# Patient Record
Sex: FEMALE | Race: WHITE | NOT HISPANIC OR LATINO | Employment: OTHER | URBAN - METROPOLITAN AREA
[De-identification: names, ages, dates, MRNs, and addresses within clinical notes are randomized per-mention and may not be internally consistent; named-entity substitution may affect disease eponyms.]

---

## 2013-06-18 LAB — PAP SMEAR - HIM PATIENT REPORTED: NEGATIVE

## 2017-01-07 ENCOUNTER — ALLIED HEALTH/NURSE VISIT (OUTPATIENT)
Dept: FAMILY MEDICINE | Facility: CLINIC | Age: 72
End: 2017-01-07
Payer: MEDICARE

## 2017-01-07 VITALS — DIASTOLIC BLOOD PRESSURE: 50 MMHG | SYSTOLIC BLOOD PRESSURE: 134 MMHG

## 2017-01-07 DIAGNOSIS — I10 BENIGN ESSENTIAL HYPERTENSION: Primary | ICD-10-CM

## 2017-01-07 PROCEDURE — 99207 ZZC NO CHARGE NURSE ONLY: CPT | Performed by: FAMILY MEDICINE

## 2017-01-07 NOTE — PROGRESS NOTES
Chasity Hodgson is enrolled/participating in the retail pharmacy Blood Pressure Goals Achievement Program (BPGAP).  Chasity Hodgson was evaluated at [facility] on January 7, 2017 at which time her blood pressure was:    BP Readings from Last 3 Encounters:   01/07/17 134/50   11/29/16 132/60   11/22/16 126/68     Reviewed lifestyle modifications for blood pressure control and reduction: including making healthy food choices, managing weight, getting regular exercise, smoking cessation, reducing alcohol consumption, monitoring blood pressure regularly.     Chasity Hodgson is not experiencing symptoms.    Follow-Up: BP is at goal of < 140/90mmHg (patient 18+ years of age with or without diabetes).  Recommended follow-up at pharmacy in 6 months.     Completed by: Nichelle Hernandez, PharmD  [facility]

## 2017-01-09 DIAGNOSIS — Z13.6 CARDIOVASCULAR SCREENING; LDL GOAL LESS THAN 130: Primary | ICD-10-CM

## 2017-01-09 RX ORDER — FENOFIBRATE 145 MG/1
145 TABLET, COATED ORAL DAILY
Qty: 90 TABLET | Refills: 0 | Status: CANCELLED | OUTPATIENT
Start: 2017-01-09

## 2017-01-09 NOTE — TELEPHONE ENCOUNTER
Pending Prescriptions:                       Disp   Refills    fenofibrate 145 MG tablet                 90 tab*0            Sig: Take 1 tablet (145 mg) by mouth daily          Last Written Prescription Date: 10-5-16  Last Fill Quantity: 90, # refills: 0  Last Office Visit with G, P or [facility] prescribing provider: 11-29-16   Next 5 appointments (look out 90 days)     Regulo 10, 2017  1:00 PM   [facility]    [address] [facility] [id number] [phone]                   POTASSIUM   Date Value Ref Range Status   11/22/2016 4.6 3.4 - 5.3 mmol/L Final     CREATININE   Date Value Ref Range Status   11/22/2016 1.11* 0.52 - 1.04 mg/dL Final     BP Readings from Last 3 Encounters:   01/07/17 134/50   11/29/16 132/60   11/22/16 126/68     RT Bernardo (R)

## 2017-01-10 ENCOUNTER — OFFICE VISIT (OUTPATIENT)
Dept: FAMILY MEDICINE | Facility: CLINIC | Age: 72
End: 2017-01-10
Payer: MEDICARE

## 2017-01-10 VITALS
DIASTOLIC BLOOD PRESSURE: 60 MMHG | WEIGHT: 211 LBS | OXYGEN SATURATION: 98 % | SYSTOLIC BLOOD PRESSURE: 132 MMHG | TEMPERATURE: 96.7 F | HEART RATE: 107 BPM | BODY MASS INDEX: 38.83 KG/M2 | HEIGHT: 62 IN

## 2017-01-10 DIAGNOSIS — K31.84 DIABETIC GASTROPARESIS (H): ICD-10-CM

## 2017-01-10 DIAGNOSIS — Z93.3 COLOSTOMY IN PLACE (H): ICD-10-CM

## 2017-01-10 DIAGNOSIS — R11.0 NAUSEA: ICD-10-CM

## 2017-01-10 DIAGNOSIS — A80.9 POLIOMYELITIS: ICD-10-CM

## 2017-01-10 DIAGNOSIS — I10 BENIGN ESSENTIAL HYPERTENSION: ICD-10-CM

## 2017-01-10 DIAGNOSIS — I10 ESSENTIAL HYPERTENSION: ICD-10-CM

## 2017-01-10 DIAGNOSIS — R10.84 ABDOMINAL PAIN, GENERALIZED: Primary | ICD-10-CM

## 2017-01-10 DIAGNOSIS — E78.5 HYPERLIPIDEMIA LDL GOAL <100: ICD-10-CM

## 2017-01-10 DIAGNOSIS — E11.43 DIABETIC GASTROPARESIS (H): ICD-10-CM

## 2017-01-10 DIAGNOSIS — D62 ANEMIA DUE TO BLOOD LOSS, ACUTE: ICD-10-CM

## 2017-01-10 DIAGNOSIS — E11.69 TYPE 2 DIABETES MELLITUS WITH OTHER SPECIFIED COMPLICATION (H): ICD-10-CM

## 2017-01-10 DIAGNOSIS — Z13.6 CARDIOVASCULAR SCREENING; LDL GOAL LESS THAN 130: ICD-10-CM

## 2017-01-10 LAB
BASOPHILS # BLD AUTO: 0 10E9/L (ref 0–0.2)
BASOPHILS NFR BLD AUTO: 0.4 %
DIFFERENTIAL METHOD BLD: ABNORMAL
EOSINOPHIL # BLD AUTO: 0.3 10E9/L (ref 0–0.7)
EOSINOPHIL NFR BLD AUTO: 3.4 %
ERYTHROCYTE [DISTWIDTH] IN BLOOD BY AUTOMATED COUNT: 11.9 % (ref 10–15)
HBA1C MFR BLD: 7.4 % (ref 4.3–6)
HCT VFR BLD AUTO: 32.8 % (ref 35–47)
HGB BLD-MCNC: 11 G/DL (ref 11.7–15.7)
LIPASE SERPL-CCNC: 101 U/L (ref 73–393)
LYMPHOCYTES # BLD AUTO: 2 10E9/L (ref 0.8–5.3)
LYMPHOCYTES NFR BLD AUTO: 24.3 %
MCH RBC QN AUTO: 30.4 PG (ref 26.5–33)
MCHC RBC AUTO-ENTMCNC: 33.5 G/DL (ref 31.5–36.5)
MCV RBC AUTO: 91 FL (ref 78–100)
MONOCYTES # BLD AUTO: 0.6 10E9/L (ref 0–1.3)
MONOCYTES NFR BLD AUTO: 7.1 %
NEUTROPHILS # BLD AUTO: 5.3 10E9/L (ref 1.6–8.3)
NEUTROPHILS NFR BLD AUTO: 64.8 %
PLATELET # BLD AUTO: 201 10E9/L (ref 150–450)
RBC # BLD AUTO: 3.62 10E12/L (ref 3.8–5.2)
RETICS # AUTO: 88.3 10E9/L (ref 25–95)
RETICS/RBC NFR AUTO: 2.4 % (ref 0.5–2)
WBC # BLD AUTO: 8.2 10E9/L (ref 4–11)

## 2017-01-10 PROCEDURE — 85045 AUTOMATED RETICULOCYTE COUNT: CPT | Performed by: INTERNAL MEDICINE

## 2017-01-10 PROCEDURE — 40000847 ZZHCL STATISTIC MORPHOLOGY W/INTERP HISTOLOGY TC 85060: Performed by: INTERNAL MEDICINE

## 2017-01-10 PROCEDURE — 85060 BLOOD SMEAR INTERPRETATION: CPT | Performed by: INTERNAL MEDICINE

## 2017-01-10 PROCEDURE — 36415 COLL VENOUS BLD VENIPUNCTURE: CPT | Performed by: INTERNAL MEDICINE

## 2017-01-10 PROCEDURE — 80053 COMPREHEN METABOLIC PANEL: CPT | Performed by: INTERNAL MEDICINE

## 2017-01-10 PROCEDURE — 83690 ASSAY OF LIPASE: CPT | Performed by: INTERNAL MEDICINE

## 2017-01-10 PROCEDURE — 82043 UR ALBUMIN QUANTITATIVE: CPT | Performed by: INTERNAL MEDICINE

## 2017-01-10 PROCEDURE — 83036 HEMOGLOBIN GLYCOSYLATED A1C: CPT | Performed by: INTERNAL MEDICINE

## 2017-01-10 PROCEDURE — 93000 ELECTROCARDIOGRAM COMPLETE: CPT | Performed by: INTERNAL MEDICINE

## 2017-01-10 PROCEDURE — 85025 COMPLETE CBC W/AUTO DIFF WBC: CPT | Performed by: INTERNAL MEDICINE

## 2017-01-10 PROCEDURE — 99214 OFFICE O/P EST MOD 30 MIN: CPT | Performed by: INTERNAL MEDICINE

## 2017-01-10 RX ORDER — LISINOPRIL 20 MG/1
20 TABLET ORAL DAILY
Qty: 90 TABLET | Refills: 2 | Status: SHIPPED | OUTPATIENT
Start: 2017-01-10 | End: 2017-10-25

## 2017-01-10 RX ORDER — HYDROCHLOROTHIAZIDE 25 MG/1
25 TABLET ORAL DAILY
Qty: 90 TABLET | Refills: 2 | Status: SHIPPED | OUTPATIENT
Start: 2017-01-10 | End: 2017-11-22

## 2017-01-10 RX ORDER — ATENOLOL 50 MG/1
50 TABLET ORAL 2 TIMES DAILY
Qty: 180 TABLET | Refills: 2 | Status: SHIPPED | OUTPATIENT
Start: 2017-01-10 | End: 2017-10-30

## 2017-01-10 RX ORDER — FENOFIBRATE 145 MG/1
145 TABLET, COATED ORAL DAILY
Qty: 90 TABLET | Refills: 2 | Status: SHIPPED | OUTPATIENT
Start: 2017-01-10 | End: 2017-09-26

## 2017-01-10 RX ORDER — GABAPENTIN 300 MG/1
300 CAPSULE ORAL
Qty: 90 CAPSULE | Refills: 3 | Status: SHIPPED | OUTPATIENT
Start: 2017-01-10 | End: 2017-02-06

## 2017-01-10 RX ORDER — ATORVASTATIN CALCIUM 20 MG/1
20 TABLET, FILM COATED ORAL DAILY
Qty: 90 TABLET | Refills: 2 | Status: SHIPPED | OUTPATIENT
Start: 2017-01-10 | End: 2017-06-14

## 2017-01-10 NOTE — PATIENT INSTRUCTIONS
(R10.84) Abdominal pain, generalized  (primary encounter diagnosis)  Comment: We will check CT of the abdomen and check liver function tests and lipase level as well as urinalysis today.  I would recommend a follow up in gastroenterology to review as well and consider follow up colonoscopy   Plan: CT Abdomen w/o Contrast, Comprehensive         metabolic panel, Lipase            (R11.0) Nausea  Comment: We will check labs today and if hemoglobin A1c is stable we could consider adjustment by stopping the januvia  Plan:     (E11.69) Type 2 diabetes mellitus with other specified complication (H)  Comment: as above - also follow up in endocrinology   Plan: Hemoglobin A1c, Comprehensive metabolic panel,         Albumin Random Urine Quantitative, UA reflex to        Microscopic and Culture            (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

## 2017-01-10 NOTE — PROGRESS NOTES
"[facility]  CLINIC PROGRESS NOTE    Subjective:  Benign hypertension   She did stop taking amlodipine as it was causing significant swelling in her ankles.  Her blood pressure has been checked at the pharmacy an it was 132/60's.  She feels well with her current medications.    Nausea   She has had severe nausea and wonders if Januvia is contributing to this as well as to her loose stools.  She has had typically loose stools every time.  She has continued to take Januvia.  Cough   She still has episodes of cough and episodes of feeling horse.  Chest pain   She attributes evening chest pain to gastroesophageal reflux symptoms and taking nexium twice per day.  Her nausea is due to this.   Dizziness   She does have occasional episodes of dizziness  Sleepiness   She also notes occasional symptoms of feeling sleepiness.     Past medical history, medications, allergies, social history, family history reviewed and updated in EPIC as of 1/10/2017 .    ROS  CONSTITUTIONAL: no fatigue, no unexpected change in weight  SKIN: no worrisome rashes, no worrisome moles, no worrisome lesions  EYES: no acute vision problems or changes  ENT: no ear problems, no mouth problems, no throat problems  RESP: no significant cough, no shortness of breath  CV: no chest pain, no palpitations, no new or worsening peripheral edema  GI: as above   : no frequency, no dysuria, no hematuria  MS: no claudication, no myalgias, no joint aches  PSYCHIATRIC: no changes in mood or affect      Objective:  Vitals  /60 mmHg  Pulse 107  Temp(Src) 96.7  F (35.9  C)  Ht 5' 1.5\" (1.562 m)  Wt 211 lb (95.709 kg)  BMI 39.23 kg/m2  SpO2 98%  Breastfeeding? No  GEN: Alert Oriented x3 NAD  HEENT: Atraumatic, normocephalic, neck supple, no thyromegaly, negative cervical adenopathy  CV: RRR no murmurs or rubs  PULM: CTA no wheezes or crackles  ABD: Soft, tenderness in the right upper quadrant and epigastrium, ostomy site intact   SKIN: No visible " skin lesion or ulcerations  EXT: 2+ edema bilateral lower extremities  NEURO: Gait and station deferred, No focal neurologic deficits  PSYCH: Mood good, affect mood congruent    No results found for this or any previous visit (from the past 24 hour(s)).    Assessment/Plan:  Patient Instructions   (R10.84) Abdominal pain, generalized  (primary encounter diagnosis)  Comment: We will check CT of the abdomen and check liver function tests and lipase level as well as urinalysis today.  I would recommend a follow up in gastroenterology to review as well and consider follow up colonoscopy   Plan: CT Abdomen w/o Contrast, Comprehensive         metabolic panel, Lipase            (R11.0) Nausea  Comment: We will check labs today and if hemoglobin A1c is stable we could consider adjustment by stopping the januvia  Plan:     (E11.69) Type 2 diabetes mellitus with other specified complication (H)  Comment: as above - also follow up in endocrinology   Plan: Hemoglobin A1c, Comprehensive metabolic panel,         Albumin Random Urine Quantitative, UA reflex to        Microscopic and Culture            (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                  Follow up in gastroenterology and endocrinology     Disclaimer: This note consists of symbols derived from keyboarding, dictation and/or voice recognition software. As a result, there may be errors in the script that have gone undetected. Please consider this when  interpreting information found in this chart.    Shola Benoit MD  [phone]

## 2017-01-10 NOTE — MR AVS SNAPSHOT
After Visit Summary   1/10/2017    Chasity Hodgson    MRN: [id number]           Patient Information     Date Of Birth          1945        Visit Information        Provider Department      1/10/2017 1:00 PM Shola Benoit MD [facility]        Today's Diagnoses     Abdominal pain, generalized    -  1     Nausea         Type 2 diabetes mellitus with other specified complication (H)         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)         Diabetic gastroparesis (H)         Benign essential hypertension         Anemia due to blood loss, acute         Poliomyelitis         CARDIOVASCULAR SCREENING; LDL GOAL LESS THAN 130         Hyperlipidemia LDL goal <100         Essential hypertension           Care Instructions    (R10.84) Abdominal pain, generalized  (primary encounter diagnosis)  Comment: We will check CT of the abdomen and check liver function tests and lipase level as well as urinalysis today.  I would recommend a follow up in gastroenterology to review as well and consider follow up colonoscopy   Plan: CT Abdomen w/o Contrast, Comprehensive         metabolic panel, Lipase            (R11.0) Nausea  Comment: We will check labs today and if hemoglobin A1c is stable we could consider adjustment by stopping the januvia  Plan:     (E11.69) Type 2 diabetes mellitus with other specified complication (H)  Comment: as above - also follow up in endocrinology   Plan: Hemoglobin A1c, Comprehensive metabolic panel,         Albumin Random Urine Quantitative, UA reflex to        Microscopic and Culture            (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)  Comment: Follow up in gastroenterology as above  Plan:     (E11.43,  K31.84) Diabetic gastroparesis (H)  Comment: OK to continue metoclopramide - consider taking this regularly scheduled daily with breakfast rather than as needed   Plan:     (I10) Benign essential hypertension  Comment: blood pressure is excellent - OK to hold amlodipne   Plan:     (D62) Anemia due  "to blood loss, acute  Comment: recheck of hemoglobin and also check peripheral smear  Plan: CBC with platelets, BLOOD MORPHOLOGY         PATHOLOGIST REVIEW                    Follow-ups after your visit        Future tests that were ordered for you today     Open Future Orders        Priority Expected Expires Ordered    UA reflex to Microscopic and Culture Routine  1/11/2018 1/10/2017    CT Abdomen w/o Contrast Routine  1/10/2018 1/10/2017            Who to contact     If you have questions or need follow up information about today's clinic visit or your schedule please contact [facility] directly at [phone].  Normal or non-critical lab and imaging results will be communicated to you by [employer], letter or phone within 4 business days after the clinic has received the results. If you do not hear from us within 7 days, please contact the clinic through [employer] or phone. If you have a critical or abnormal lab result, we will notify you by phone as soon as possible.  Submit refill requests through [employer] or call your pharmacy and they will forward the refill request to us. Please allow 3 business days for your refill to be completed.          Additional Information About Your Visit        MyChart Information     [employer] gives you secure access to your electronic health record. If you see a primary care provider, you can also send messages to your care team and make appointments. If you have questions, please call your primary care clinic.  If you do not have a primary care provider, please call [phone] and they will assist you.        Care EveryWhere ID     This is your Care EveryWhere ID. This could be used by other organizations to access your [place] medical records  [id number]        Your Vitals Were     Pulse Temperature Height BMI (Body Mass Index) Pulse Oximetry Breastfeeding?    107 96.7  F (35.9  C) 5' 1.5\" (1.562 m) 39.23 kg/m2 98% No       Blood Pressure from Last 3 Encounters: "   01/10/17 132/60   01/07/17 134/50   11/29/16 132/60    Weight from Last 3 Encounters:   01/10/17 211 lb (95.709 kg)   11/29/16 213 lb 1.6 oz (96.662 kg)   11/22/16 211 lb 9.6 oz (95.981 kg)              We Performed the Following     Albumin Random Urine Quantitative     BLOOD MORPHOLOGY PATHOLOGIST REVIEW     CBC with platelets     Comprehensive metabolic panel     Hemoglobin A1c     Lipase          Today's Medication Changes          These changes are accurate as of: 1/10/17  1:34 PM.  If you have any questions, ask your nurse or doctor.               These medicines have changed or have updated prescriptions.        Dose/Directions    atenolol 50 MG tablet   Commonly known as:  TENORMIN   This may have changed:  See the new instructions.   Used for:  Essential hypertension   Changed by:  Shola Benoit MD        Dose:  50 mg   Take 1 tablet (50 mg) by mouth 2 times daily   Quantity:  180 tablet   Refills:  2       atorvastatin 20 MG tablet   Commonly known as:  LIPITOR   This may have changed:  See the new instructions.   Used for:  Hyperlipidemia LDL goal <100   Changed by:  Shola Benoit MD        Dose:  20 mg   Take 1 tablet (20 mg) by mouth daily   Quantity:  90 tablet   Refills:  2       fenofibrate 145 MG tablet   This may have changed:  See the new instructions.   Used for:  CARDIOVASCULAR SCREENING; LDL GOAL LESS THAN 130   Changed by:  Shola Benoit MD        Dose:  145 mg   Take 1 tablet (145 mg) by mouth daily   Quantity:  90 tablet   Refills:  2       hydrochlorothiazide 25 MG tablet   Commonly known as:  HYDRODIURIL   This may have changed:  See the new instructions.   Used for:  Essential hypertension   Changed by:  Shola Benoit MD        Dose:  25 mg   Take 1 tablet (25 mg) by mouth daily   Quantity:  90 tablet   Refills:  2       lisinopril 20 MG tablet   Commonly known as:  PRINIVIL/ZESTRIL   This may have changed:  See the new instructions.   Used  for:  Essential hypertension   Changed by:  Shola Benoit MD        Dose:  20 mg   Take 1 tablet (20 mg) by mouth daily   Quantity:  90 tablet   Refills:  2            Where to get your medicines      These medications were sent to [facility] [place], [place] - [address]  [address] [facility] [address]     Phone:  [phone]    - atenolol 50 MG tablet  - atorvastatin 20 MG tablet  - fenofibrate 145 MG tablet  - gabapentin 300 MG capsule  - hydrochlorothiazide 25 MG tablet  - lisinopril 20 MG tablet             Primary Care Provider Office Phone # Fax #    Shola Benoit [phone]       [facility] [address]  [facility] [address]        Thank you!     Thank you for choosing [facility]  for your care. Our goal is always to provide you with excellent care. Hearing back from our patients is one way we can continue to improve our services. Please take a few minutes to complete the written survey that you may receive in the mail after your visit with us. Thank you!             Your Updated Medication List - Protect others around you: Learn how to safely use, store and throw away your medicines at www.disposemymeds.org.          This list is accurate as of: 1/10/17  1:34 PM.  Always use your most recent med list.                   Brand Name Dispense Instructions for use    albuterol 108 (90 BASE) MCG/ACT Inhaler    PROAIR HFA/PROVENTIL HFA/VENTOLIN HFA    1 Inhaler    Inhale 2 puffs into the lungs every 6 hours as needed for shortness of breath / dyspnea or wheezing       amLODIPine 2.5 MG tablet    NORVASC    30 tablet    Take 1 tablet (2.5 mg) by mouth daily       ascorbic acid 1000 MG Tabs    vitamin C     Take 1,000 mg by mouth daily       atenolol 50 MG tablet    TENORMIN    180 tablet    Take 1 tablet (50 mg) by mouth 2 times daily       atorvastatin 20 MG tablet    LIPITOR    90 tablet    Take 1 tablet (20  mg) by mouth daily       benzonatate 200 MG capsule    TESSALON    21 capsule    Take 1 capsule (200 mg) by mouth 3 times daily as needed for cough       BIOTIN PO      Take 1,000 mcg by mouth       blood glucose monitoring test strip    no brand specified    1 Box    Use to test blood sugar 1 times daily or as directed.       CALCIUM CITRATE + D PO      Take 2 tablets by mouth daily       cycloSPORINE 0.05 % ophthalmic emulsion    RESTASIS     1 drop 2 times daily       diphenhydrAMINE-acetaminophen  MG tablet    TYLENOL PM     Take 1 tablet by mouth At Bedtime       emollient cream     60 g    Apply twice daily as needed to face. Mix half and half with 1% hydrocortisone       esomeprazole 40 MG CR capsule    nexIUM     Take 40 mg by mouth 2 times daily Take 30-60 minutes before eating.       estradiol 1 MG tablet    ESTRACE     Take 1 mg by mouth daily       fenofibrate 145 MG tablet     90 tablet    Take 1 tablet (145 mg) by mouth daily       ferrous sulfate 325 (65 FE) MG tablet    IRON     Take 1 tablet by mouth daily (with breakfast).       fexofenadine 180 MG tablet    ALLEGRA    120    Take 180 mg by mouth daily.       fluticasone 50 MCG/BLIST Aepb    FLOVENT DISKUS     Inhale 1 puff into the lungs every 12 hours       gabapentin 300 MG capsule    NEURONTIN    90 capsule    Take 1 capsule (300 mg) by mouth every evening as needed       hydrochlorothiazide 25 MG tablet    HYDRODIURIL    90 tablet    Take 1 tablet (25 mg) by mouth daily       lisinopril 20 MG tablet    PRINIVIL/ZESTRIL    90 tablet    Take 1 tablet (20 mg) by mouth daily       metFORMIN 500 MG tablet    GLUCOPHAGE     Take 500 mg by mouth 2 times daily (with meals)       metoclopramide 10 MG tablet    REGLAN    120 tablet    Take 1 tablet (10 mg) by mouth At Bedtime       multivitamin per tablet     100    ONE DAILY       nystatin cream    MYCOSTATIN     Apply topically daily as needed       nystatin-triamcinolone cream    MYCOLOG II      Apply topically daily as needed       ondansetron 4 MG tablet    ZOFRAN     Take 1 tablet by mouth every 6 hours as needed       * order for DME     1 Units    Equipment being ordered: Oral appliance for sleep apnea       * order for DME     2 each    Equipment being ordered: Compression stockings - Knee High; 20-30 mmHg compression       ranitidine 300 MG tablet    ZANTAC    30 tablet    Take by mouth At Bedtime       SB LOW DOSE ASA EC 81 MG EC tablet   Generic drug:  aspirin      Take 81 mg by mouth daily       sitagliptin 50 MG tablet    JANUVIA     Take 50 mg by mouth daily       sucralfate 1 GM/10ML suspension    CARAFATE     Take 10 mLs by mouth 3 times daily as needed       SYNTHROID 112 MCG tablet   Generic drug:  levothyroxine      Take 112 mcg by mouth daily Take 112 mcg daily except for Friday take 56 mcg.  Brand name Synthroid       VITAMIN D-3 PO      Take 2 tablets by mouth       * Notice:  This list has 2 medication(s) that are the same as other medications prescribed for you. Read the directions carefully, and ask your doctor or other care provider to review them with you.

## 2017-01-11 LAB
ALBUMIN SERPL-MCNC: 3.3 G/DL (ref 3.4–5)
ALP SERPL-CCNC: 37 U/L (ref 40–150)
ALT SERPL W P-5'-P-CCNC: 31 U/L (ref 0–50)
ANION GAP SERPL CALCULATED.3IONS-SCNC: 8 MMOL/L (ref 3–14)
AST SERPL W P-5'-P-CCNC: 26 U/L (ref 0–45)
BILIRUB SERPL-MCNC: 0.4 MG/DL (ref 0.2–1.3)
BUN SERPL-MCNC: 22 MG/DL (ref 7–30)
CALCIUM SERPL-MCNC: 9 MG/DL (ref 8.5–10.1)
CHLORIDE SERPL-SCNC: 100 MMOL/L (ref 94–109)
CO2 SERPL-SCNC: 28 MMOL/L (ref 20–32)
COPATH REPORT: NORMAL
CREAT SERPL-MCNC: 0.96 MG/DL (ref 0.52–1.04)
CREAT UR-MCNC: 56 MG/DL
GFR SERPL CREATININE-BSD FRML MDRD: 57 ML/MIN/1.7M2
GLUCOSE SERPL-MCNC: 168 MG/DL (ref 70–99)
MICROALBUMIN UR-MCNC: <5 MG/L
MICROALBUMIN/CREAT UR: NORMAL MG/G CR (ref 0–25)
POTASSIUM SERPL-SCNC: 4.7 MMOL/L (ref 3.4–5.3)
PROT SERPL-MCNC: 6.7 G/DL (ref 6.8–8.8)
SODIUM SERPL-SCNC: 136 MMOL/L (ref 133–144)

## 2017-01-13 ENCOUNTER — HOSPITAL ENCOUNTER (OUTPATIENT)
Dept: CT IMAGING | Facility: CLINIC | Age: 72
Discharge: HOME | End: 2017-01-13
Attending: INTERNAL MEDICINE | Admitting: INTERNAL MEDICINE
Payer: MEDICARE

## 2017-01-13 DIAGNOSIS — R10.84 ABDOMINAL PAIN, GENERALIZED: ICD-10-CM

## 2017-01-13 PROCEDURE — 74176 CT ABD & PELVIS W/O CONTRAST: CPT

## 2017-01-16 ENCOUNTER — TELEPHONE (OUTPATIENT)
Dept: FAMILY MEDICINE | Facility: CLINIC | Age: 72
End: 2017-01-16

## 2017-01-16 ENCOUNTER — HOSPITAL ENCOUNTER (OUTPATIENT)
Dept: CT IMAGING | Facility: CLINIC | Age: 72
Discharge: HOME | End: 2017-01-16
Attending: INTERNAL MEDICINE | Admitting: INTERNAL MEDICINE
Payer: MEDICARE

## 2017-01-16 ENCOUNTER — OFFICE VISIT (OUTPATIENT)
Dept: FAMILY MEDICINE | Facility: CLINIC | Age: 72
End: 2017-01-16
Payer: MEDICARE

## 2017-01-16 VITALS
SYSTOLIC BLOOD PRESSURE: 124 MMHG | DIASTOLIC BLOOD PRESSURE: 60 MMHG | BODY MASS INDEX: 37.91 KG/M2 | WEIGHT: 206 LBS | OXYGEN SATURATION: 97 % | TEMPERATURE: 97.6 F | HEIGHT: 62 IN | HEART RATE: 88 BPM

## 2017-01-16 DIAGNOSIS — D64.9 NORMOCYTIC ANEMIA: Primary | ICD-10-CM

## 2017-01-16 DIAGNOSIS — R10.9 ACUTE ABDOMINAL PAIN: ICD-10-CM

## 2017-01-16 DIAGNOSIS — R10.9 ACUTE ABDOMINAL PAIN: Primary | ICD-10-CM

## 2017-01-16 LAB
ALBUMIN SERPL-MCNC: 3.3 G/DL (ref 3.4–5)
ALBUMIN UR-MCNC: NEGATIVE MG/DL
ALP SERPL-CCNC: 39 U/L (ref 40–150)
ALT SERPL W P-5'-P-CCNC: 28 U/L (ref 0–50)
ANION GAP SERPL CALCULATED.3IONS-SCNC: 10 MMOL/L (ref 3–14)
APPEARANCE UR: CLEAR
AST SERPL W P-5'-P-CCNC: 28 U/L (ref 0–45)
BASOPHILS # BLD AUTO: 0 10E9/L (ref 0–0.2)
BASOPHILS NFR BLD AUTO: 0.1 %
BILIRUB SERPL-MCNC: 0.4 MG/DL (ref 0.2–1.3)
BILIRUB UR QL STRIP: NEGATIVE
BUN SERPL-MCNC: 25 MG/DL (ref 7–30)
CALCIUM SERPL-MCNC: 8.6 MG/DL (ref 8.5–10.1)
CHLORIDE SERPL-SCNC: 106 MMOL/L (ref 94–109)
CO2 SERPL-SCNC: 24 MMOL/L (ref 20–32)
COLOR UR AUTO: YELLOW
CREAT SERPL-MCNC: 0.98 MG/DL (ref 0.52–1.04)
DIFFERENTIAL METHOD BLD: ABNORMAL
EOSINOPHIL # BLD AUTO: 0.1 10E9/L (ref 0–0.7)
EOSINOPHIL NFR BLD AUTO: 1.4 %
ERYTHROCYTE [DISTWIDTH] IN BLOOD BY AUTOMATED COUNT: 12.2 % (ref 10–15)
GFR SERPL CREATININE-BSD FRML MDRD: 56 ML/MIN/1.7M2
GLUCOSE SERPL-MCNC: 180 MG/DL (ref 70–99)
GLUCOSE UR STRIP-MCNC: NEGATIVE MG/DL
HCT VFR BLD AUTO: 38.4 % (ref 35–47)
HGB BLD-MCNC: 12.9 G/DL (ref 11.7–15.7)
HGB UR QL STRIP: NEGATIVE
KETONES UR STRIP-MCNC: NEGATIVE MG/DL
LEUKOCYTE ESTERASE UR QL STRIP: NEGATIVE
LYMPHOCYTES # BLD AUTO: 0.4 10E9/L (ref 0.8–5.3)
LYMPHOCYTES NFR BLD AUTO: 5.3 %
MCH RBC QN AUTO: 30.6 PG (ref 26.5–33)
MCHC RBC AUTO-ENTMCNC: 33.6 G/DL (ref 31.5–36.5)
MCV RBC AUTO: 91 FL (ref 78–100)
MONOCYTES # BLD AUTO: 0.3 10E9/L (ref 0–1.3)
MONOCYTES NFR BLD AUTO: 4.5 %
NEUTROPHILS # BLD AUTO: 6.3 10E9/L (ref 1.6–8.3)
NEUTROPHILS NFR BLD AUTO: 88.7 %
NITRATE UR QL: NEGATIVE
PH UR STRIP: 5.5 PH (ref 5–7)
PLATELET # BLD AUTO: 219 10E9/L (ref 150–450)
POTASSIUM SERPL-SCNC: 4.4 MMOL/L (ref 3.4–5.3)
PROT SERPL-MCNC: 7.1 G/DL (ref 6.8–8.8)
RBC # BLD AUTO: 4.22 10E12/L (ref 3.8–5.2)
SODIUM SERPL-SCNC: 140 MMOL/L (ref 133–144)
SP GR UR STRIP: >1.03 (ref 1–1.03)
URN SPEC COLLECT METH UR: NORMAL
UROBILINOGEN UR STRIP-ACNC: 0.2 EU/DL (ref 0.2–1)
WBC # BLD AUTO: 7.2 10E9/L (ref 4–11)

## 2017-01-16 PROCEDURE — 99214 OFFICE O/P EST MOD 30 MIN: CPT | Performed by: INTERNAL MEDICINE

## 2017-01-16 PROCEDURE — 74177 CT ABD & PELVIS W/CONTRAST: CPT

## 2017-01-16 PROCEDURE — 36415 COLL VENOUS BLD VENIPUNCTURE: CPT | Performed by: INTERNAL MEDICINE

## 2017-01-16 PROCEDURE — 25500064 ZZH RX 255 OP 636: Performed by: INTERNAL MEDICINE

## 2017-01-16 PROCEDURE — 81003 URINALYSIS AUTO W/O SCOPE: CPT | Performed by: INTERNAL MEDICINE

## 2017-01-16 PROCEDURE — 80053 COMPREHEN METABOLIC PANEL: CPT | Performed by: INTERNAL MEDICINE

## 2017-01-16 PROCEDURE — 25000125 ZZHC RX 250: Performed by: INTERNAL MEDICINE

## 2017-01-16 PROCEDURE — 85025 COMPLETE CBC W/AUTO DIFF WBC: CPT | Performed by: INTERNAL MEDICINE

## 2017-01-16 RX ORDER — IOPAMIDOL 755 MG/ML
100 INJECTION, SOLUTION INTRAVASCULAR ONCE
Status: COMPLETED | OUTPATIENT
Start: 2017-01-16 | End: 2017-01-16

## 2017-01-16 RX ADMIN — SODIUM CHLORIDE 70 ML: 9 INJECTION, SOLUTION INTRAVENOUS at 17:59

## 2017-01-16 RX ADMIN — IOPAMIDOL 100 ML: 755 INJECTION, SOLUTION INTRAVENOUS at 17:59

## 2017-01-16 NOTE — PROGRESS NOTES
Quick Note:    Gerard Gaspar,    I have had the opportunity to review your recent results and an interpretation is as follows:  Your blood counts again show evidence of anemia and your peripheral smear corroborates this. It appears that you have a normocytic and normochromic anemia with rare teardrop cells. Given that we do not have a conclusive explanation for your anemia I would recommend a referral to hematology.   [facility]: [facility] [place] [phone]   Your conference metabolic panel also does show evidence of low total protein and low albumin - this may be secondary to protein loss through the bowel due to rapid transit. We can continue to monitor this    Sincerely,  Shola Benoit MD  ______

## 2017-01-16 NOTE — PROGRESS NOTES
Quick Note:    Gerard Gaspar,    I have had the opportunity to review your recent results and an interpretation is as follows:  Your CT scan did not show any significant findings. This is a very good result and we can continue to monitor your symptoms     Sincerely,  Shola Benoit MD  ______

## 2017-01-16 NOTE — TELEPHONE ENCOUNTER
"PCP:    Pt was seen for OV on 1/10/17 for ABD pain. CT came back normal. Was advised to see GI.     Pt states that last night she vomited 5 times, vomited 4 times this morning as well. Has vomited up some of her AM med's.   Pt had a large amount of liquid stool in her colostomy bag. For the most part her stools are liquid at baseline.   Pt has appointment on 2/14 with GI for follow up as reccommended.   Pt is having ABD pain, lower of part of ABD. Pt describes the pain as an ache \"like someone punched me in the stomach\"  Pt states she has the chills, has not checked her tempeture. Pt is having some weakness, attributes this to all of the vomiting.     No appointments with PCP here in the clinic today, nothing Left with TEAM.   I did advise Pt be seen today, offered UC at both [place] and [place]. Also transferred her to [facility] to see if they have any openings.     Marbella Aldridge RN         "

## 2017-01-16 NOTE — TELEPHONE ENCOUNTER
Reason for call:  Patient reporting a symptom    Symptom or request: vomiting 5 times last night, took synthroid then threw up again in about 5minutes, loose stools in bag yellow water ache, stomach chills, wondering if she should continue taking her medication    Duration (how long have symptoms been present): last had similar episode 3weeks ago but not as severe    Have you been treated for this before? No    Additional comments: spoke with Dr Benoit about loose stools, had ct scan last friday without contrast abdominal    Phone Number patient can be reached at:  Home number on file [phone] (home)    Best Time:  any    Can we leave a detailed message on this number:  YES    Call taken on 1/16/2017 at 8:18 AM by Brii Haywood

## 2017-01-16 NOTE — PATIENT INSTRUCTIONS
(R10.9) Acute abdominal pain  (primary encounter diagnosis)  Comment: Noted worsening abdominal pain today with some guarding.   I am concerned for small bowel obstruction versus possible volvulus.  We will request an emergent CT today and follow up accordingly.   Plan: CT Abdomen Pelvis w Contrast, CBC with         platelets and differential, Comprehensive         metabolic panel, UA reflex to Microscopic and         Culture

## 2017-01-16 NOTE — MR AVS SNAPSHOT
After Visit Summary   1/16/2017    Chasity Hodgson    MRN: [id number]           Patient Information     Date Of Birth          1945        Visit Information        Provider Department      1/16/2017 2:15 PM Shola Benoit MD [facility]        Today's Diagnoses     Acute abdominal pain    -  1       Care Instructions    (R10.9) Acute abdominal pain  (primary encounter diagnosis)  Comment: Noted worsening abdominal pain today with some guarding.   I am concerned for small bowel obstruction versus possible volvulus.  We will request an emergent CT today and follow up accordingly.   Plan: CT Abdomen Pelvis w Contrast, CBC with         platelets and differential, Comprehensive         metabolic panel, UA reflex to Microscopic and         Culture                    Follow-ups after your visit        Future tests that were ordered for you today     Open Future Orders        Priority Expected Expires Ordered    CT Abdomen Pelvis w Contrast STAT  1/16/2018 1/16/2017            Who to contact     If you have questions or need follow up information about today's clinic visit or your schedule please contact [facility] directly at [phone].  Normal or non-critical lab and imaging results will be communicated to you by [employer], letter or phone within 4 business days after the clinic has received the results. If you do not hear from us within 7 days, please contact the clinic through [employer] or phone. If you have a critical or abnormal lab result, we will notify you by phone as soon as possible.  Submit refill requests through [employer] or call your pharmacy and they will forward the refill request to us. Please allow 3 business days for your refill to be completed.          Additional Information About Your Visit        Tripp[employer] In[employer] Information     [employer] gives you secure access to your electronic health record. If you see a primary care provider, you can also send messages to  "your care team and make appointments. If you have questions, please call your primary care clinic.  If you do not have a primary care provider, please call [phone] and they will assist you.        Care EveryWhere ID     This is your Care EveryWhere ID. This could be used by other organizations to access your [facility] medical records  [id number]        Your Vitals Were     Pulse Temperature Height BMI (Body Mass Index) Pulse Oximetry Breastfeeding?    88 97.6  F (36.4  C) (Tympanic) 5' 1.5\" (1.562 m) 38.30 kg/m2 97% No       Blood Pressure from Last 3 Encounters:   01/16/17 124/60   01/10/17 132/60   01/07/17 134/50    Weight from Last 3 Encounters:   01/16/17 206 lb (93.441 kg)   01/10/17 211 lb (95.709 kg)   11/29/16 213 lb 1.6 oz (96.662 kg)              We Performed the Following     CBC with platelets and differential     Comprehensive metabolic panel     UA reflex to Microscopic and Culture        Primary Care Provider Office Phone # Fax #    Shola Benoit [phone]       [facility] [address]  [place] [address]        Thank you!     Thank you for choosing [facility]  for your care. Our goal is always to provide you with excellent care. Hearing back from our patients is one way we can continue to improve our services. Please take a few minutes to complete the written survey that you may receive in the mail after your visit with us. Thank you!             Your Updated Medication List - Protect others around you: Learn how to safely use, store and throw away your medicines at www.disposemymeds.org.          This list is accurate as of: 1/16/17  3:03 PM.  Always use your most recent med list.                   Brand Name Dispense Instructions for use    albuterol 108 (90 BASE) MCG/ACT Inhaler    PROAIR HFA/PROVENTIL HFA/VENTOLIN HFA    1 Inhaler    Inhale 2 puffs into the lungs every 6 hours as needed for shortness of breath / dyspnea or " wheezing       amLODIPine 2.5 MG tablet    NORVASC    30 tablet    Take 1 tablet (2.5 mg) by mouth daily       ascorbic acid 1000 MG Tabs    vitamin C     Take 1,000 mg by mouth daily       atenolol 50 MG tablet    TENORMIN    180 tablet    Take 1 tablet (50 mg) by mouth 2 times daily       atorvastatin 20 MG tablet    LIPITOR    90 tablet    Take 1 tablet (20 mg) by mouth daily       benzonatate 200 MG capsule    TESSALON    21 capsule    Take 1 capsule (200 mg) by mouth 3 times daily as needed for cough       BIOTIN PO      Take 1,000 mcg by mouth       blood glucose monitoring test strip    no brand specified    1 Box    Use to test blood sugar 1 times daily or as directed.       CALCIUM CITRATE + D PO      Take 2 tablets by mouth daily       cycloSPORINE 0.05 % ophthalmic emulsion    RESTASIS     1 drop 2 times daily       diphenhydrAMINE-acetaminophen  MG tablet    TYLENOL PM     Take 1 tablet by mouth At Bedtime       emollient cream     60 g    Apply twice daily as needed to face. Mix half and half with 1% hydrocortisone       esomeprazole 40 MG CR capsule    nexIUM     Take 40 mg by mouth 2 times daily Take 30-60 minutes before eating.       estradiol 1 MG tablet    ESTRACE     Take 1 mg by mouth daily       fenofibrate 145 MG tablet     90 tablet    Take 1 tablet (145 mg) by mouth daily       ferrous sulfate 325 (65 FE) MG tablet    IRON     Take 1 tablet by mouth daily (with breakfast).       fexofenadine 180 MG tablet    ALLEGRA    120    Take 180 mg by mouth daily.       fluticasone 50 MCG/BLIST Aepb    FLOVENT DISKUS     Inhale 1 puff into the lungs every 12 hours       gabapentin 300 MG capsule    NEURONTIN    90 capsule    Take 1 capsule (300 mg) by mouth every evening as needed       hydrochlorothiazide 25 MG tablet    HYDRODIURIL    90 tablet    Take 1 tablet (25 mg) by mouth daily       lisinopril 20 MG tablet    PRINIVIL/ZESTRIL    90 tablet    Take 1 tablet (20 mg) by mouth daily        metFORMIN 500 MG tablet    GLUCOPHAGE     Take 500 mg by mouth 2 times daily (with meals)       metoclopramide 10 MG tablet    REGLAN    120 tablet    Take 1 tablet (10 mg) by mouth At Bedtime       multivitamin per tablet     100    ONE DAILY       nystatin cream    MYCOSTATIN     Apply topically daily as needed       nystatin-triamcinolone cream    MYCOLOG II     Apply topically daily as needed       ondansetron 4 MG tablet    ZOFRAN     Take 1 tablet by mouth every 6 hours as needed       * order for DME     1 Units    Equipment being ordered: Oral appliance for sleep apnea       * order for DME     2 each    Equipment being ordered: Compression stockings - Knee High; 20-30 mmHg compression       ranitidine 300 MG tablet    ZANTAC    30 tablet    Take by mouth At Bedtime       SB LOW DOSE ASA EC 81 MG EC tablet   Generic drug:  aspirin      Take 81 mg by mouth daily       sitagliptin 50 MG tablet    JANUVIA     Take 50 mg by mouth daily       sucralfate 1 GM/10ML suspension    CARAFATE     Take 10 mLs by mouth 3 times daily as needed       SYNTHROID 112 MCG tablet   Generic drug:  levothyroxine      Take 112 mcg by mouth daily Take 112 mcg daily except for Friday take 56 mcg.  Brand name Synthroid       VITAMIN D-3 PO      Take 2 tablets by mouth       * Notice:  This list has 2 medication(s) that are the same as other medications prescribed for you. Read the directions carefully, and ask your doctor or other care provider to review them with you.

## 2017-01-16 NOTE — NURSING NOTE
"Chief Complaint   Patient presents with     GI Problem       Initial /60 mmHg  Pulse 88  Temp(Src) 97.6  F (36.4  C) (Tympanic)  Ht 5' 1.5\" (1.562 m)  Wt 206 lb (93.441 kg)  BMI 38.30 kg/m2  SpO2 97%  Breastfeeding? No Estimated body mass index is 38.3 kg/(m^2) as calculated from the following:    Height as of this encounter: 5' 1.5\" (1.562 m).    Weight as of this encounter: 206 lb (93.441 kg).  BP completed using cuff size: large left arm  Fozia Mcallen- CMA      "

## 2017-01-16 NOTE — PROGRESS NOTES
"[facility]  CLINIC PROGRESS NOTE    Subjective:  Gastroenteritis   Chasity Hodgson did feel good yesterday, but then woke up this AM around 3:00 AM and felt ill this AM with noted loose stools in the colostomy and vomiting this AM.  Vomit was brownish in color.  The stool was noted to be yellow/green.  She noted that she ate pizza and a small piece of cake.  She notes no other exposures.  Her  did have some loose stools this AM, but feels fine now.   She has been trying to stay hydrated, but having abdominal pain and feeling nauseas with gas distension.        Past medical history, medications, allergies, social history, family history reviewed and updated in [facility] as of 1/16/2017 .    ROS  CONSTITUTIONAL: no fatigue, no unexpected change in weight  SKIN: no worrisome rashes, no worrisome moles, no worrisome lesions  EYES: no acute vision problems or changes  ENT: no ear problems, no mouth problems, no throat problems  RESP: no significant cough, no shortness of breath  CV: no chest pain, no palpitations, no new or worsening peripheral edema  GI: as above   : no frequency, no dysuria, no hematuria  MS: Not discussed  PSYCHIATRIC: no changes in mood or affect      Objective:  Vitals  /60 mmHg  Pulse 88  Temp(Src) 97.6  F (36.4  C) (Tympanic)  Ht 5' 1.5\" (1.562 m)  Wt 206 lb (93.441 kg)  BMI 38.30 kg/m2  SpO2 97%  Breastfeeding? No  GEN: Alert Oriented x3 NAD  HEENT: Atraumatic, normocephalic, neck supple, no thyromegaly, negative cervical adenopathy  TM: TM bilaterally pearly and grey with normal light reflex  CV: RRR no murmurs or rubs  PULM: CTA no wheezes or crackles  ABD: Soft, tenderness with palpation in epigastric area, mild guarding  SKIN: No visible skin lesion or ulcerations  EXT: trace edema bilateral lower extremities  NEURO: Gait and station deferred, No focal neurologic deficits  PSYCH: Mood good, affect mood congruent    No results found for this or any previous visit " (from the past 24 hour(s)).    Assessment/Plan:  Patient Instructions   (R10.9) Acute abdominal pain  (primary encounter diagnosis)  Comment: Noted worsening abdominal pain today with some guarding.   I am concerned for small bowel obstruction versus possible volvulus.  We will request an emergent CT today and follow up accordingly.   Plan: CT Abdomen Pelvis w Contrast, CBC with         platelets and differential, Comprehensive         metabolic panel, UA reflex to Microscopic and         Culture          Addendum  CT scan was reviewed as were labs and there is no acute findings appreciated.  This was discussed with patient and she will continue with current therapy and follow-up in gastroenterology clinic.  Metformin was held for today and will be held for an additional 48 hours given contrast dye given for CT scan    25 minutes spent with patient.  Over 50% of time counseling       Follow up in gastroenterology     Disclaimer: This note consists of symbols derived from keyboarding, dictation and/or voice recognition software. As a result, there may be errors in the script that have gone undetected. Please consider this when interpreting information found in this chart.    Shola Benoit MD  [phone]

## 2017-01-17 ENCOUNTER — TELEPHONE (OUTPATIENT)
Dept: FAMILY MEDICINE | Facility: CLINIC | Age: 72
End: 2017-01-17

## 2017-01-17 DIAGNOSIS — E11.69 TYPE 2 DIABETES MELLITUS WITH OTHER SPECIFIED COMPLICATION (H): Primary | ICD-10-CM

## 2017-01-17 NOTE — TELEPHONE ENCOUNTER
Call to patient.  She states she received a piece of paper from the Radiologist yesterday with a list of instructions, she was told she had abnormal kidney function.  Patient states there were two boxes checked:    1) Patient was to hold her Metformin for 2 days.  2) Patient was to have repeat Cr drawn in 3 days.    Informed would check with PCP for plan. She did not take Metformin yesterday or today yet.  She reports she only takes once daily with her evening meal, not BID as noted in medication list.    Patient also states her vomiting has subsided, along with most of the diarrhea.  She feels fatigued and has been unable to eat yet.   She is trying to take frequent small sips of water.  Advised patient continue to rest and try to push fluids, increase diet as tolerated.  ED precautions reviewed with patient.    Forwarding to provider to advise on 1 and 2 above.  Ivana Dubon RN

## 2017-01-17 NOTE — TELEPHONE ENCOUNTER
Yes, she should continued to hold metformin until her creatinine has been rechecked  Shola Benoit MD

## 2017-01-17 NOTE — PROGRESS NOTES
Quick Note:    Gerard Gaspar,    I have had the opportunity to review your recent results and an interpretation is as follows:  As we discussed -no significant findings to explain the abdominal pain and nausea and vomiting. We will continue to monitor and follow up in gastroenterology as planned.     Sincerely,  Shola Benoit MD  ______

## 2017-01-17 NOTE — TELEPHONE ENCOUNTER
Call to inform patient of provider instructions.  Patient has no further questions, lab appointment scheduled for Thursday morning.   Ivana Dubon RN

## 2017-01-17 NOTE — TELEPHONE ENCOUNTER
Reason for Call:  Other     Detailed comments: The patient is calling to let Dr. Benoit know how she is doing  She is feeling weak but she thinks it is due to the Diarrhea   The vomiting is done but she is still a little Nauseated and has a little pain  She has not been able to eat and has only taken in a small amount of water  She has scheduled an visit with Dr. Oliva at [facility] on 1/20/2017    Phone Number Patient can be reached at: Home number on file [phone] (home)    Best Time: anytime    Can we leave a detailed message on this number? YES    Call taken on 1/17/2017 at 1:06 PM by Kayleen Tony

## 2017-01-17 NOTE — PROGRESS NOTES
Quick Note:    Gerard Gaspar,    I have had the opportunity to review your recent results and an interpretation is as follows:  Your labs returned today unchanged  There is no evidence of a urinary tract infection  Your comments a metabolic panel shows stable renal function and electrolytes as well as stable liver function tests  Your complete blood count's show an improvement in your hemoglobin and a normal white blood cell count     Sincerely,  Shola Benoit MD  ______

## 2017-01-19 DIAGNOSIS — E11.69 TYPE 2 DIABETES MELLITUS WITH OTHER SPECIFIED COMPLICATION (H): ICD-10-CM

## 2017-01-19 LAB
CREAT SERPL-MCNC: 1.13 MG/DL (ref 0.52–1.04)
GFR SERPL CREATININE-BSD FRML MDRD: 48 ML/MIN/1.7M2

## 2017-01-19 PROCEDURE — 36415 COLL VENOUS BLD VENIPUNCTURE: CPT | Performed by: INTERNAL MEDICINE

## 2017-01-19 PROCEDURE — 82565 ASSAY OF CREATININE: CPT | Performed by: INTERNAL MEDICINE

## 2017-01-20 ENCOUNTER — TRANSFERRED RECORDS (OUTPATIENT)
Dept: HEALTH INFORMATION MANAGEMENT | Facility: CLINIC | Age: 72
End: 2017-01-20

## 2017-02-14 ENCOUNTER — TRANSFERRED RECORDS (OUTPATIENT)
Dept: CARDIOLOGY | Facility: CLINIC | Age: 72
End: 2017-02-14

## 2017-02-14 ENCOUNTER — OFFICE VISIT (OUTPATIENT)
Dept: URGENT CARE | Facility: URGENT CARE | Age: 72
End: 2017-02-14
Payer: MEDICARE

## 2017-02-14 VITALS
BODY MASS INDEX: 38.48 KG/M2 | TEMPERATURE: 98.1 F | HEART RATE: 72 BPM | SYSTOLIC BLOOD PRESSURE: 136 MMHG | OXYGEN SATURATION: 97 % | DIASTOLIC BLOOD PRESSURE: 78 MMHG | WEIGHT: 207 LBS

## 2017-02-14 DIAGNOSIS — J34.89 PURULENT NASAL DISCHARGE: ICD-10-CM

## 2017-02-14 DIAGNOSIS — J30.89 SEASONAL ALLERGIC RHINITIS DUE TO OTHER ALLERGIC TRIGGER: ICD-10-CM

## 2017-02-14 DIAGNOSIS — J01.90 ACUTE SINUSITIS WITH COEXISTING CONDITION REQUIRING PROPHYLACTIC TREATMENT: Primary | ICD-10-CM

## 2017-02-14 LAB
HBA1C MFR BLD: 8.5 % (ref 0–5.7)
TSH SERPL-ACNC: 1 MCU/ML

## 2017-02-14 PROCEDURE — 99214 OFFICE O/P EST MOD 30 MIN: CPT | Performed by: PHYSICIAN ASSISTANT

## 2017-02-14 RX ORDER — AMOXICILLIN 875 MG
875 TABLET ORAL 2 TIMES DAILY
Qty: 20 TABLET | Refills: 0 | Status: SHIPPED | OUTPATIENT
Start: 2017-02-14 | End: 2017-02-24

## 2017-02-14 NOTE — NURSING NOTE
"Chief Complaint   Patient presents with     Sinus Problem     sinus headache, pressure and pain x 1 week        Initial /78 (BP Location: Right arm, Patient Position: Chair, Cuff Size: Adult Regular)  Pulse 72  Temp 98.1  F (36.7  C) (Oral)  Wt 207 lb (93.9 kg)  SpO2 97%  BMI 38.48 kg/m2 Estimated body mass index is 38.48 kg/(m^2) as calculated from the following:    Height as of 1/16/17: 5' 1.5\" (1.562 m).    Weight as of this encounter: 207 lb (93.9 kg).    "

## 2017-02-14 NOTE — MR AVS SNAPSHOT
After Visit Summary   2/14/2017    Chasity Hodgson    MRN: [id number]           Patient Information     Date Of Birth          1945        Visit Information        Provider Department      2/14/2017 3:30 PM Abhishek Patino PA-C [facility]        Today's Diagnoses     Acute sinusitis with coexisting condition requiring prophylactic treatment    -  1    Purulent nasal discharge        Seasonal allergic rhinitis due to other allergic trigger           Follow-ups after your visit        Who to contact     If you have questions or need follow up information about today's clinic visit or your schedule please contact [facility] directly at [phone].  Normal or non-critical lab and imaging results will be communicated to you by MyChart, letter or phone within 4 business days after the clinic has received the results. If you do not hear from us within 7 days, please contact the clinic through [employer]ab[employer] or phone. If you have a critical or abnormal lab result, we will notify you by phone as soon as possible.  Submit refill requests through [employer] or call your pharmacy and they will forward the refill request to us. Please allow 3 business days for your refill to be completed.          Additional Information About Your Visit        MyChart Information     [employer] gives you secure access to your electronic health record. If you see a primary care provider, you can also send messages to your care team and make appointments. If you have questions, please call your primary care clinic.  If you do not have a primary care provider, please call [phone] and they will assist you.        Care EveryWhere ID     This is your Care EveryWhere ID. This could be used by other organizations to access your [facility] medical records  [id number]        Your Vitals Were     Pulse Temperature Pulse Oximetry BMI (Body Mass Index)          72 98.1  F (36.7  C) (Oral)  97% 38.48 kg/m2         Blood Pressure from Last 3 Encounters:   02/14/17 136/78   01/16/17 124/60   01/10/17 132/60    Weight from Last 3 Encounters:   02/14/17 207 lb (93.9 kg)   01/16/17 206 lb (93.4 kg)   01/10/17 211 lb (95.7 kg)              Today, you had the following     No orders found for display         Today's Medication Changes          These changes are accurate as of: 2/14/17 11:59 PM.  If you have any questions, ask your nurse or doctor.               Start taking these medicines.        Dose/Directions    amoxicillin 875 MG tablet   Commonly known as:  AMOXIL   Used for:  Acute sinusitis with coexisting condition requiring prophylactic treatment, Purulent nasal discharge   Started by:  Abhishek Patino PA-C        Dose:  875 mg   Take 1 tablet (875 mg) by mouth 2 times daily for 10 days   Quantity:  20 tablet   Refills:  0       fluticasone 50 MCG/ACT spray   Commonly known as:  FLONASE   Used for:  Seasonal allergic rhinitis due to other allergic trigger   Started by:  Abhishek Patino PA-C        Dose:  2 spray   Spray 2 sprays in nostril daily 2 sprays in each nostril qd   Quantity:  1 Bottle   Refills:  0            Where to get your medicines      These medications [place] [facility] [place] [address]  [address] [facility] [address]     Phone:  [phone]     amoxicillin 875 MG tablet         Some of these will need a paper prescription and others can be bought over the counter.  Ask your nurse if you have questions.     Bring a paper prescription for each of these medications     fluticasone 50 MCG/ACT spray                Primary Care Provider Office Phone # Fax #    Shola Benoit [phone]       [facility] [address]  [facility] [address]        Thank you!     Thank you for choosing [facility]  for your care. Our goal is always to provide you with excellent  care. Hearing back from our patients is one way we can continue to improve our services. Please take a few minutes to complete the written survey that you may receive in the mail after your visit with us. Thank you!             Your Updated Medication List - Protect others around you: Learn how to safely use, store and throw away your medicines at www.disposemymeds.org.          This list is accurate as of: 2/14/17 11:59 PM.  Always use your most recent med list.                   Brand Name Dispense Instructions for use    albuterol 108 (90 BASE) MCG/ACT Inhaler    PROAIR HFA/PROVENTIL HFA/VENTOLIN HFA    1 Inhaler    Inhale 2 puffs into the lungs every 6 hours as needed for shortness of breath / dyspnea or wheezing       amLODIPine 2.5 MG tablet    NORVASC    30 tablet    Take 1 tablet (2.5 mg) by mouth daily       amoxicillin 875 MG tablet    AMOXIL    20 tablet    Take 1 tablet (875 mg) by mouth 2 times daily for 10 days       ascorbic acid 1000 MG Tabs    vitamin C     Take 1,000 mg by mouth daily       atenolol 50 MG tablet    TENORMIN    180 tablet    Take 1 tablet (50 mg) by mouth 2 times daily       atorvastatin 20 MG tablet    LIPITOR    90 tablet    Take 1 tablet (20 mg) by mouth daily       benzonatate 200 MG capsule    TESSALON    21 capsule    Take 1 capsule (200 mg) by mouth 3 times daily as needed for cough       BIOTIN PO      Take 1,000 mcg by mouth       blood glucose monitoring test strip    no brand specified    1 Box    Use to test blood sugar 1 times daily or as directed.       CALCIUM CITRATE + D PO      Take 2 tablets by mouth daily       cycloSPORINE 0.05 % ophthalmic emulsion    RESTASIS     1 drop 2 times daily       diphenhydrAMINE-acetaminophen  MG tablet    TYLENOL PM     Take 1 tablet by mouth At Bedtime       emollient cream     60 g    Apply twice daily as needed to face. Mix half and half with 1% hydrocortisone       esomeprazole 40 MG CR capsule    nexIUM     Take 40 mg by  mouth 2 times daily Take 30-60 minutes before eating.       estradiol 1 MG tablet    ESTRACE     Take 1 mg by mouth daily       fenofibrate 145 MG tablet     90 tablet    Take 1 tablet (145 mg) by mouth daily       ferrous sulfate 325 (65 FE) MG tablet    IRON     Take 1 tablet by mouth daily (with breakfast).       fexofenadine 180 MG tablet    ALLEGRA    120    Take 180 mg by mouth daily.       fluticasone 50 MCG/ACT spray    FLONASE    1 Bottle    Spray 2 sprays in nostril daily 2 sprays in each nostril qd       fluticasone 50 MCG/BLIST Aepb    FLOVENT DISKUS     Inhale 1 puff into the lungs every 12 hours       gabapentin 100 MG capsule    NEURONTIN    90 capsule    Take 1 capsule (100 mg) by mouth every evening as needed       hydrochlorothiazide 25 MG tablet    HYDRODIURIL    90 tablet    Take 1 tablet (25 mg) by mouth daily       lisinopril 20 MG tablet    PRINIVIL/ZESTRIL    90 tablet    Take 1 tablet (20 mg) by mouth daily       metFORMIN 500 MG tablet    GLUCOPHAGE     Take 500 mg by mouth 2 times daily (with meals) Reported on 2/14/2017       metoclopramide 10 MG tablet    REGLAN    120 tablet    Take 10 mg by mouth At Bedtime Reported on 2/14/2017       multivitamin per tablet     100    ONE DAILY       nystatin cream    MYCOSTATIN     Apply topically daily as needed       nystatin-triamcinolone cream    MYCOLOG II     Apply topically daily as needed       ondansetron 4 MG tablet    ZOFRAN     Take 1 tablet by mouth every 6 hours as needed       * order for DME     1 Units    Equipment being ordered: Oral appliance for sleep apnea       * order for DME     2 each    Equipment being ordered: Compression stockings - Knee High; 20-30 mmHg compression       ranitidine 300 MG tablet    ZANTAC    30 tablet    Take by mouth At Bedtime Reported on 2/14/2017       SB LOW DOSE ASA EC 81 MG EC tablet   Generic drug:  aspirin      Take 81 mg by mouth daily       sitagliptin 50 MG tablet    JANUVIA     Take 50 mg by  mouth daily       sucralfate 1 GM/10ML suspension    CARAFATE     Take 10 mLs by mouth 3 times daily as needed       SYNTHROID 112 MCG tablet   Generic drug:  levothyroxine      Take 112 mcg by mouth daily Take 112 mcg daily except for Friday take 56 mcg.  Brand name Synthroid       VITAMIN D-3 PO      Take 2 tablets by mouth       ZICAM COLD REMEDY PO          * Notice:  This list has 2 medication(s) that are the same as other medications prescribed for you. Read the directions carefully, and ask your doctor or other care provider to review them with you.

## 2017-02-15 LAB
ANION GAP SERPL CALCULATED.3IONS-SCNC: NORMAL MMOL/L
BUN SERPL-MCNC: 16 MG/DL
CALCIUM SERPL-MCNC: 9.1 MG/DL
CHLORIDE SERPLBLD-SCNC: 96 MMOL/L
CO2 SERPL-SCNC: 28 MMOL/L
CREAT SERPL-MCNC: 0.94 MG/DL
GFR SERPL CREATININE-BSD FRML MDRD: 61 ML/MIN/1.73M2
GLUCOSE SERPL-MCNC: 151 MG/DL (ref 70–99)
POTASSIUM SERPL-SCNC: 4.2 MMOL/L
SODIUM SERPL-SCNC: 132 MMOL/L

## 2017-02-16 RX ORDER — FLUTICASONE PROPIONATE 50 MCG
2 SPRAY, SUSPENSION (ML) NASAL DAILY
Qty: 1 BOTTLE | Refills: 0 | Status: SHIPPED | OUTPATIENT
Start: 2017-02-16 | End: 2024-07-24

## 2017-02-16 NOTE — PROGRESS NOTES
SUBJECTIVE:  Chasity Hodgson is a 71 year old female here with concerns about sinus infection.  She states onset of symptoms were 1 week(s) ago.  She has had maxillary, frontal pressure. Course of illness is worsening. Severity moderate  Current and Associated symptoms: nasal congestion, rhinorrhea and facial pain/pressure  Predisposing factors include recent illness. Recent treatment has included: OTC meds    Past Medical History   Diagnosis Date     Abdominal adhesions 1984, 96,99     s/p lysis     Allergic rhinitis, cause unspecified      Carotid stenosis      CPAP (continuous positive airway pressure) dependence      Diabetic gastroparesis (H)      Diet-controlled type 2 diabetes mellitus (H)      DVT of axillary vein, acute right (H)      Fibromyalgia      Gastro-oesophageal reflux disease      Hernia of unspecified site of abdominal cavity without mention of obstruction or gangrene      bilateral     HTN (hypertension)      Hypertriglyceridemia      Obstructive sleep apnea      ANNETTE (obstructive sleep apnea)      Papillary carcinoma of thyroid (H)      s/p thyroidectomy - Ruegemer     PE (pulmonary embolism)      Poliomyelitis      poor circulation right leg     Postsurgical hypothyroidism      s/p papillary thryoid cancer - Ruegemer     Pulmonary embolism (H)      Rosacea      S/P carpal tunnel release      bilateral     S/P hardware removal 01/2014     still with lingering foot pain     S/P shoulder surgery      bilateral     Septic joint (H)      right knee     Venous insufficiency      Venous thrombosis 1999     right axillary vein     Social History   Substance Use Topics     Smoking status: Never Smoker     Smokeless tobacco: Never Used     Alcohol use 0.0 oz/week     0 Standard drinks or equivalent per week      Comment: very rare       ROS:  CONSTITUTIONAL:NEGATIVE for fever, chills, change in weight  INTEGUMENTARY/SKIN: NEGATIVE for worrisome rashes, moles or lesions  ENT/MOUTH: POSITIVE for sinus pain,  pressure, drainage  RESP:POSITIVE for cough-non productive  CV: NEGATIVE for chest pain, palpitations or peripheral edema  GI: NEGATIVE for nausea, abdominal pain, heartburn, or change in bowel habits  MUSCULOSKELETAL: NEGATIVE for significant arthralgias or myalgia  NEURO: NEGATIVE for weakness, dizziness or paresthesias    OBJECTIVE:  /78 (BP Location: Right arm, Patient Position: Chair, Cuff Size: Adult Regular)  Pulse 72  Temp 98.1  F (36.7  C) (Oral)  Wt 207 lb (93.9 kg)  SpO2 97%  BMI 38.48 kg/m2  Exam:GENERAL APPEARANCE: healthy, alert and no distress  EYES: EOMI,  PERRL, conjunctiva clear  HENT: TM's normal bilaterally, nasal turbinates erythematous, swollen and rhinorrhea yellow and pink  NECK: supple, nontender, no lymphadenopathy  RESP: lungs clear to auscultation - no rales, rhonchi or wheezes  CV: regular rates and rhythm, normal S1 S2, no murmur noted  NEURO: Normal strength and tone, sensory exam grossly normal,  normal speech and mentation  SKIN: no suspicious lesions or rashes    ASSESSMENT/PLAN/PLAN:      ICD-10-CM    1. Acute sinusitis with coexisting condition requiring prophylactic treatment J01.90 amoxicillin (AMOXIL) 875 MG tablet   2. Purulent nasal discharge J34.89 amoxicillin (AMOXIL) 875 MG tablet   3. Seasonal allergic rhinitis due to other allergic trigger J30.89 fluticasone (FLONASE) 50 MCG/ACT spray     Saline nasal spray  Follow up with primary clinic if not improving

## 2017-02-17 ENCOUNTER — TRANSFERRED RECORDS (OUTPATIENT)
Dept: HEALTH INFORMATION MANAGEMENT | Facility: CLINIC | Age: 72
End: 2017-02-17

## 2017-02-17 LAB
CHOL/HDLC RATIO - QUEST: 4.6
CHOLEST SERPL-MCNC: 142 MG/DL
HDLC SERPL-MCNC: 31 MG/DL
LDLC SERPL CALC-MCNC: 49 MG/DL
NONHDLC SERPL-MCNC: 111 MG/DL
TRIGL SERPL-MCNC: 309 MG/DL

## 2017-02-20 ENCOUNTER — TRANSFERRED RECORDS (OUTPATIENT)
Dept: HEALTH INFORMATION MANAGEMENT | Facility: CLINIC | Age: 72
End: 2017-02-20

## 2017-02-22 ENCOUNTER — TRANSFERRED RECORDS (OUTPATIENT)
Dept: HEALTH INFORMATION MANAGEMENT | Facility: CLINIC | Age: 72
End: 2017-02-22

## 2017-02-27 ENCOUNTER — TRANSFERRED RECORDS (OUTPATIENT)
Dept: HEALTH INFORMATION MANAGEMENT | Facility: CLINIC | Age: 72
End: 2017-02-27

## 2017-03-03 ENCOUNTER — HOSPITAL ENCOUNTER (OUTPATIENT)
Dept: MAMMOGRAPHY | Facility: CLINIC | Age: 72
Discharge: HOME | End: 2017-03-03
Attending: OBSTETRICS & GYNECOLOGY | Admitting: OBSTETRICS & GYNECOLOGY
Payer: MEDICARE

## 2017-03-03 ENCOUNTER — HOSPITAL ENCOUNTER (OUTPATIENT)
Dept: MAMMOGRAPHY | Facility: CLINIC | Age: 72
End: 2017-03-03
Attending: OBSTETRICS & GYNECOLOGY
Payer: MEDICARE

## 2017-03-03 DIAGNOSIS — N63.0 BREAST LUMP: ICD-10-CM

## 2017-03-03 PROCEDURE — 76642 ULTRASOUND BREAST LIMITED: CPT | Mod: LT

## 2017-03-03 PROCEDURE — G0204 DX MAMMO INCL CAD BI: HCPCS

## 2017-03-06 ENCOUNTER — TRANSFERRED RECORDS (OUTPATIENT)
Dept: HEALTH INFORMATION MANAGEMENT | Facility: CLINIC | Age: 72
End: 2017-03-06

## 2017-03-17 ENCOUNTER — HOSPITAL ENCOUNTER (OUTPATIENT)
Dept: NUCLEAR MEDICINE | Facility: CLINIC | Age: 72
Setting detail: NUCLEAR MEDICINE
Discharge: HOME | End: 2017-03-17
Attending: INTERNAL MEDICINE | Admitting: INTERNAL MEDICINE
Payer: MEDICARE

## 2017-03-17 DIAGNOSIS — R10.13 EPIGASTRIC PAIN: ICD-10-CM

## 2017-03-17 PROCEDURE — 78264 GASTRIC EMPTYING IMG STUDY: CPT

## 2017-03-17 PROCEDURE — A9541 TC99M SULFUR COLLOID: HCPCS | Performed by: INTERNAL MEDICINE

## 2017-03-17 PROCEDURE — 34300033 ZZH RX 343: Performed by: INTERNAL MEDICINE

## 2017-03-17 RX ADMIN — Medication 2 MILLICURIE: at 08:11

## 2017-03-20 ENCOUNTER — OFFICE VISIT (OUTPATIENT)
Dept: SURGERY | Facility: CLINIC | Age: 72
End: 2017-03-20
Payer: MEDICARE

## 2017-03-20 ENCOUNTER — HOSPITAL ENCOUNTER (OUTPATIENT)
Dept: MAMMOGRAPHY | Facility: CLINIC | Age: 72
Discharge: HOME | End: 2017-03-20
Attending: SURGERY | Admitting: SURGERY
Payer: MEDICARE

## 2017-03-20 VITALS
SYSTOLIC BLOOD PRESSURE: 122 MMHG | HEIGHT: 61 IN | BODY MASS INDEX: 38.71 KG/M2 | DIASTOLIC BLOOD PRESSURE: 84 MMHG | WEIGHT: 205 LBS | HEART RATE: 60 BPM

## 2017-03-20 DIAGNOSIS — Z80.3 FAMILY HISTORY OF BREAST CANCER: Primary | ICD-10-CM

## 2017-03-20 DIAGNOSIS — Z80.3 FAMILY HISTORY OF BREAST CANCER: ICD-10-CM

## 2017-03-20 DIAGNOSIS — N63.21 BREAST LUMP ON LEFT SIDE AT 2 O'CLOCK POSITION: ICD-10-CM

## 2017-03-20 PROCEDURE — 76642 ULTRASOUND BREAST LIMITED: CPT | Mod: LT

## 2017-03-20 PROCEDURE — 99203 OFFICE O/P NEW LOW 30 MIN: CPT | Performed by: SURGERY

## 2017-03-20 NOTE — MR AVS SNAPSHOT
After Visit Summary   3/20/2017    Chasity Hodgson    MRN: [id number]           Patient Information     Date Of Birth          1945        Visit Information        Provider Department      3/20/2017 11:20 AM Sneha Mayo MD [facility]      Today's Diagnoses     Family history of breast cancer    -  1    Breast lump on left side at 2 o'clock position           Follow-ups after your visit        Future tests that were ordered for you today     Open Future Orders        Priority Expected Expires Ordered    US Breast Left Limited 1-3 Quadrants Routine 3/20/2017 3/20/2018 3/20/2017            Who to contact     If you have questions or need follow up information about today's clinic visit or your schedule please contact [facility] directly at [phone].  Normal or non-critical lab and imaging results will be communicated to you by My[employer], letter or phone within 4 business days after the clinic has received the results. If you do not hear from us within 7 days, please contact the clinic through My[employer] or phone. If you have a critical or abnormal lab result, we will notify you by phone as soon as possible.  Submit refill requests through [employer] or call your pharmacy and they will forward the refill request to us. Please allow 3 business days for your refill to be completed.          Additional Information About Your Visit        MyChart Information     [employer] gives you secure access to your electronic health record. If you see a primary care provider, you can also send messages to your care team and make appointments. If you have questions, please call your primary care clinic.  If you do not have a primary care provider, please call [phone] and they will assist you.        Care EveryWhere ID     This is your Care EveryWhere ID. This could be used by other organizations to  "access your [facility] medical records  [id number]        Your Vitals Were     Pulse Height BMI (Body Mass Index)             60 5' 0.5\" (1.537 m) 39.38 kg/m2          Blood Pressure from Last 3 Encounters:   03/20/17 122/84   02/14/17 136/78   01/16/17 124/60    Weight from Last 3 Encounters:   03/20/17 205 lb (93 kg)   02/14/17 207 lb (93.9 kg)   01/16/17 206 lb (93.4 kg)                 Today's Medication Changes          These changes are accurate as of: 3/20/17 12:04 PM.  If you have any questions, ask your nurse or doctor.               Stop taking these medicines if you haven't already. Please contact your care team if you have questions.     amLODIPine 2.5 MG tablet   Commonly known as:  NORVASC   Stopped by:  Sneha Mayo MD                    Primary Care Provider Office Phone # Fax #    Shola Benoit [phone]       [facility] [address]  [facility] [address]        Thank you!     Thank you for choosing [facility]  for your care. Our goal is always to provide you with excellent care. Hearing back from our patients is one way we can continue to improve our services. Please take a few minutes to complete the written survey that you may receive in the mail after your visit with us. Thank you!             Your Updated Medication List - Protect others around you: Learn how to safely use, store and throw away your medicines at www.disposemymeds.org.          This list is accurate as of: 3/20/17 12:04 PM.  Always use your most recent med list.                   Brand Name Dispense Instructions for use    albuterol 108 (90 BASE) MCG/ACT Inhaler    PROAIR HFA/PROVENTIL HFA/VENTOLIN HFA    1 Inhaler    Inhale 2 puffs into the lungs every 6 hours as needed for shortness of breath / dyspnea or wheezing       ascorbic acid 1000 MG Tabs    vitamin C     Take 1,000 mg by mouth daily       atenolol 50 MG tablet    TENORMIN    180 " tablet    Take 1 tablet (50 mg) by mouth 2 times daily       atorvastatin 20 MG tablet    LIPITOR    90 tablet    Take 1 tablet (20 mg) by mouth daily       BIOTIN PO      Take 1,000 mcg by mouth       blood glucose monitoring test strip    no brand specified    1 Box    Use to test blood sugar 1 times daily or as directed.       CALCIUM CITRATE + D PO      Take 2 tablets by mouth daily       cycloSPORINE 0.05 % ophthalmic emulsion    RESTASIS     1 drop 2 times daily       diphenhydrAMINE-acetaminophen  MG tablet    TYLENOL PM     Take 1 tablet by mouth At Bedtime Reported on 3/20/2017       emollient cream     60 g    Apply twice daily as needed to face. Mix half and half with 1% hydrocortisone       esomeprazole 40 MG CR capsule    nexIUM     Take 40 mg by mouth 2 times daily Take 30-60 minutes before eating.       estradiol 1 MG tablet    ESTRACE     Take 1 mg by mouth daily       fenofibrate 145 MG tablet     90 tablet    Take 1 tablet (145 mg) by mouth daily       ferrous sulfate 325 (65 FE) MG tablet    IRON     Take 1 tablet by mouth daily (with breakfast).       fexofenadine 180 MG tablet    ALLEGRA    120    Take 180 mg by mouth daily.       fluticasone 50 MCG/ACT spray    FLONASE    1 Bottle    Spray 2 sprays in nostril daily 2 sprays in each nostril qd       fluticasone 50 MCG/BLIST Aepb    FLOVENT DISKUS     Inhale 1 puff into the lungs every 12 hours       gabapentin 100 MG capsule    NEURONTIN    90 capsule    Take 1 capsule (100 mg) by mouth every evening as needed       hydrochlorothiazide 25 MG tablet    HYDRODIURIL    90 tablet    Take 1 tablet (25 mg) by mouth daily       lisinopril 20 MG tablet    PRINIVIL/ZESTRIL    90 tablet    Take 1 tablet (20 mg) by mouth daily       metFORMIN 500 MG tablet    GLUCOPHAGE     Take 500 mg by mouth 2 times daily (with meals) Reported on 2/14/2017       multivitamin per tablet     100    ONE DAILY       nystatin cream    MYCOSTATIN     Apply topically  daily as needed       nystatin-triamcinolone cream    MYCOLOG II     Apply topically daily as needed       ondansetron 4 MG tablet    ZOFRAN     Take 1 tablet by mouth every 6 hours as needed Reported on 3/20/2017       * order for DME     1 Units    Equipment being ordered: Oral appliance for sleep apnea       * order for DME     2 each    Equipment being ordered: Compression stockings - Knee High; 20-30 mmHg compression       PROTONIX PO      Take 40 mg by mouth       ranitidine 300 MG tablet    ZANTAC    30 tablet    Take by mouth At Bedtime Reported on 2/14/2017       SB LOW DOSE ASA EC 81 MG EC tablet   Generic drug:  aspirin      Take 81 mg by mouth daily       sitagliptin 50 MG tablet    JANUVIA     Take 50 mg by mouth daily       sucralfate 1 GM/10ML suspension    CARAFATE     Take 10 mLs by mouth 3 times daily as needed       SYNTHROID 112 MCG tablet   Generic drug:  levothyroxine      Take 112 mcg by mouth daily Take 112 mcg daily except for Friday take 56 mcg.  Brand name Synthroid       VITAMIN D-3 PO      Take 2 tablets by mouth       * Notice:  This list has 2 medication(s) that are the same as other medications prescribed for you. Read the directions carefully, and ask your doctor or other care provider to review them with you.

## 2017-03-20 NOTE — PROGRESS NOTES
CHIEF COMPLAINT:  Chief Complaint   Patient presents with     Consult     Left breast lump       HISTORY OF PRESENT ILLNESS:  Chasity Hodgson is a 71 year old female who is seen in consultation at the request of  Dr. Susi Valladares for evaluation of a left breast lump.    Sammie reports that she incidentally felt the lump in the left axilla in December or January.  She thought that it was a swollen lymph node because she was ill at the time.  The lump was non-tender.  The lump persisted, though, after she was no longer ill so she saw Dr. Valladares and was referred for diagnostic imaging.  She had diagnostic mammograms and ultrasound which were negative.  Sammie had a surgical left breast biopsy many years ago and 3 core needle biopsies, all of which were benign.  She has noted no other breast changes.    Sammie reached menarche at age 14.  She is  having had her first pregnancy at age 25.  She  for about 4-5 months.  Sammie had a hysterectomy and unilateral oohporectomy in  and had the other ovary removed in .  She has been on estrogen replacement continuously since .  Family history is significant for her sister being diagnosed with breast cancer at age 59.  Her mother had a breast mass that was never worked up because she  from a stroke shortly after it was identified.  There is also a maternal aunt and niece with breast cancer.    REVIEW OF SYSTEMS:  Constitutional:  Negative for chills, fatigue, fever and weight change.  Eyes:  Negative for blurred vision, eye pain and photophobia.  ENT:  Negative for hearing problems, ENT pain, congestion, rhinorrhea, epistaxis, hoarseness and dental problems.  Cardiovascular:  Negative for chest pain, palpitations, tachycardia, orthopnea and edema.  Respiratory:  Negative for cough, dyspnea and hemoptysis.  Gastrointestinal:  Negative for abdominal pain, heartburn, constipation, diarrhea and stool changes.  Musculoskeletal:  Negative for arthralgias, back pain and  myalgias.  Integumentary/Breast:  See HPI.    Past Medical History   Diagnosis Date     Abdominal adhesions 1984, 96,99     s/p lysis     Allergic rhinitis, cause unspecified      Carotid stenosis      CPAP (continuous positive airway pressure) dependence      Diabetic gastroparesis (H)      Diet-controlled type 2 diabetes mellitus (H)      DVT of axillary vein, acute right (H)      Fibromyalgia      Gastro-oesophageal reflux disease      Hernia of unspecified site of abdominal cavity without mention of obstruction or gangrene      bilateral     HTN (hypertension)      Hypertriglyceridemia      Obstructive sleep apnea      ANNETTE (obstructive sleep apnea)      Papillary carcinoma of thyroid (H)      s/p thyroidectomy - Ruegemer     PE (pulmonary embolism)      Poliomyelitis      poor circulation right leg     Postsurgical hypothyroidism      s/p papillary thryoid cancer - Ruegemer     Pulmonary embolism (H)      Rosacea      S/P carpal tunnel release      bilateral     S/P hardware removal 01/2014     still with lingering foot pain     S/P shoulder surgery      bilateral     Septic joint (H)      right knee     Venous insufficiency      Venous thrombosis 1999     right axillary vein       Past Surgical History   Procedure Laterality Date     C freeing bowel adhesion,enterolysis       1986, 1996, 1999     Gi surgery       weakened rectal sphincter with artificial stimulator     Colostomy  2/7/2012     Procedure:COLOSTOMY; CREATION OF SIGMOID COLOSTOMY AND EXTENSIVE  LYSIS OF ADHESIONS; Surgeon:MONTSERRAT BENDER; Location: OR     Laparotomy, lysis adhesions, combined  2/7/2012     Procedure:COMBINED LAPAROTOMY, LYSIS ADHESIONS; Surgeon:MONTSERRAT BENDER; Location: OR     Arthrodesis foot  3/15/2012     Procedure:ARTHRODESIS FOOT; RIGHT TRIPLE ARTHRODESIS, FIFTH TOE AMPUTATION, LATERAL LIGAMENT RECONSTRUCTION, TENDON TRANSFER AND RELEASE [MINI C-ARM, ARTHREX 4.5 AND 6.7 STAPLES, BIOCOMPOSITE TENODESIS SCREWS];  Surgeon:SUKHDEEP METZ; Location:[facility] OR     Release tendon foot  3/15/2012     Procedure:RELEASE TENDON FOOT; Surgeon:SUKHDEEP METZ; Location:[facility] OR     Amputate toe(s)  3/15/2012     Procedure:AMPUTATE TOE(S); Surgeon:SUKHDEEP METZ; Location:[facility] OR     Remove hardware foot  12/13/2012     Procedure: REMOVE HARDWARE FOOT;  RIGHT FOOT REMOVAL OF HARDWARE;  Surgeon: Sukhdeep Metz MD;  Location: [facility] OR     Appendectomy  1972     Cholecystectomy       C total abdom hysterectomy  1980     + BSO     C nonspecific procedure       throidectomy       Family History   Problem Relation Age of Onset     Arthritis Mother      Hypertension Mother      CEREBROVASCULAR DISEASE Mother      Obesity Mother      HEART DISEASE Mother      MI's     Hypertension Father      Respiratory Father      Adult RDS     DIABETES Father      adult     Arthritis Sister      CANCER Sister      Arthritis Sister      Hypertension Sister      CANCER Sister      colon polup     HEART DISEASE Brother      MI at 54     Hypertension Sister      Lipids Brother      Hypertension Brother      Lipids Sister      Obesity Sister      Obesity Maternal Grandmother      Skin Cancer Maternal Grandmother      skin cancer unknown     CANCER Maternal Grandmother      unknown skin cancer on face       Social History   Substance Use Topics     Smoking status: Never Smoker     Smokeless tobacco: Never Used     Alcohol use 0.0 oz/week     0 Standard drinks or equivalent per week      Comment: very rare       Patient Active Problem List   Diagnosis     Low back pain     Hyperlipidemia LDL goal <100     Benign essential hypertension     Fibromyalgia     Allergic rhinitis     ANNETTE (obstructive sleep apnea)     Cavovarus deformity of foot     History of DVT (deep vein thrombosis)     Hypokalemia     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)     Anemia due to blood loss, acute     Advanced directives, counseling/discussion     Postsurgical hypothyroidism      Type 2 diabetes, HbA1c goal < 7% (H)     Gastroparesis     Papillary carcinoma of thyroid (H)     Alopecia     Pulmonary nodule     Esophageal reflux     Dermatitis     Acne rosacea     Diabetic gastroparesis (H)     Poliomyelitis     Left knee pain     Carotid stenosis     Right shoulder pain     Type 2 diabetes mellitus with other specified complication (H)     Insufficiency, arterial, peripheral (H)     Allergic dermatitis due to other chemical product     Normocytic anemia     Allergies   Allergen Reactions     Nsaids Difficulty breathing     Increased creatinine     Toradol Difficulty breathing     Shortness of breath     Celecoxib Itching and Rash     Codeine Itching     With higher doses     No Clinical Screening - See Comments Itching     Fragrance     Vioxx Other (See Comments)     Heart races     Conjugated Estrogens Rash     Sulfa Drugs Rash     Current Outpatient Prescriptions   Medication Sig Dispense Refill     Pantoprazole Sodium (PROTONIX PO) Take 40 mg by mouth       lisinopril (PRINIVIL/ZESTRIL) 20 MG tablet Take 1 tablet (20 mg) by mouth daily 90 tablet 2     atenolol (TENORMIN) 50 MG tablet Take 1 tablet (50 mg) by mouth 2 times daily 180 tablet 2     levothyroxine (SYNTHROID) 112 MCG tablet Take 112 mcg by mouth daily Take 112 mcg daily except for Friday take 56 mcg.  Brand name Synthroid       fluticasone (FLONASE) 50 MCG/ACT spray Spray 2 sprays in nostril daily 2 sprays in each nostril qd 1 Bottle 0     Homeopathic Products (ZICAM COLD REMEDY PO)        gabapentin (NEURONTIN) 100 MG capsule Take 1 capsule (100 mg) by mouth every evening as needed 90 capsule 3     fenofibrate 145 MG tablet Take 1 tablet (145 mg) by mouth daily 90 tablet 2     atorvastatin (LIPITOR) 20 MG tablet Take 1 tablet (20 mg) by mouth daily 90 tablet 2     hydrochlorothiazide (HYDRODIURIL) 25 MG tablet Take 1 tablet (25 mg) by mouth daily 90 tablet 2     amLODIPine (NORVASC) 2.5 MG tablet Take 1 tablet (2.5 mg) by mouth  daily (Patient not taking: Reported on 2/14/2017) 30 tablet 3     benzonatate (TESSALON) 200 MG capsule Take 1 capsule (200 mg) by mouth 3 times daily as needed for cough (Patient not taking: Reported on 2/14/2017) 21 capsule 0     esomeprazole (NEXIUM) 40 MG capsule Take 40 mg by mouth 2 times daily Take 30-60 minutes before eating.       metFORMIN (GLUCOPHAGE) 500 MG tablet Take 500 mg by mouth 2 times daily (with meals) Reported on 2/14/2017       order for DME Equipment being ordered: Compression stockings - Knee High; 20-30 mmHg compression 2 each 0     fluticasone (FLOVENT DISKUS) 50 MCG/BLIST AEPB Inhale 1 puff into the lungs every 12 hours       order for DME Equipment being ordered: Oral appliance for sleep apnea 1 Units 0     cycloSPORINE (RESTASIS) 0.05 % ophthalmic emulsion 1 drop 2 times daily       sitagliptin (JANUVIA) 50 MG tablet Take 50 mg by mouth daily       Cholecalciferol (VITAMIN D-3 PO) Take 2 tablets by mouth       BIOTIN PO Take 1,000 mcg by mouth       albuterol (PROAIR HFA, PROVENTIL HFA, VENTOLIN HFA) 108 (90 BASE) MCG/ACT inhaler Inhale 2 puffs into the lungs every 6 hours as needed for shortness of breath / dyspnea or wheezing 1 Inhaler 1     blood glucose monitoring (NO BRAND SPECIFIED) test strip Use to test blood sugar 1 times daily or as directed. 1 Box 6     ranitidine (ZANTAC) 300 MG tablet Take by mouth At Bedtime Reported on 2/14/2017 30 tablet      sucralfate (CARAFATE) 1 GM/10ML suspension Take 10 mLs by mouth 3 times daily as needed       estradiol (ESTRACE) 1 MG tablet Take 1 mg by mouth daily       nystatin-triamcinolone (MYCOLOG II) cream Apply topically daily as needed       ascorbic acid (VITAMIN C) 1000 MG TABS Take 1,000 mg by mouth daily       Calcium Citrate-Vitamin D (CALCIUM CITRATE + D PO) Take 2 tablets by mouth daily        metoclopramide (REGLAN) 10 MG tablet Take 10 mg by mouth At Bedtime Reported on 2/14/2017 120 tablet 0     ondansetron (ZOFRAN) 4 MG tablet  "Take 1 tablet by mouth every 6 hours as needed       aspirin (SB LOW DOSE ASA EC) 81 MG EC tablet Take 81 mg by mouth daily       emollient (VANICREAM) cream Apply twice daily as needed to face. Mix half and half with 1% hydrocortisone 60 g 1     nystatin (MYCOSTATIN) cream Apply topically daily as needed        diphenhydrAMINE-acetaminophen (TYLENOL PM)  MG tablet Take 1 tablet by mouth At Bedtime        ferrous sulfate 325 (65 FE) MG tablet Take 1 tablet by mouth daily (with breakfast).       fexofenadine (ALLEGRA) 180 MG tablet Take 180 mg by mouth daily. 120 0     MULTIVITAMINS OR TABS ONE DAILY 100 3     Vitals: /84  Pulse 60  Ht 5' 0.5\" (1.537 m)  Wt 205 lb (93 kg)  BMI 39.38 kg/m2  BMI= Body mass index is 39.38 kg/(m^2).    EXAM:  GENERAL: healthy, alert and no distress   BREAST:  The breasts appear symmetric with no overlying skin changes.  The nipples are normal bilaterally.  There is no dimpling or thickening of the skin.  A small, round, superficial mass is palpated deep the the left areola at 2 o'clock.  With palpation of the mass, a small amount of discharge was noted from a Barksdale gland.  There was no nipple discharge.  A small, soft lump was appreciated in the left axilla near the edge of the breast that is well circumscribed and has the texture of a lipoma.  The breast tissue is generally pretty soft and benign to palpation.  There is no axillary or supraclavicular lymphadenopathy.  CARDIOVASCULAR:  No edema or JVD.  RESPIRATORY: negative findings: no chest deformities noted, no chest wall tenderness, breathing is unlabored.  NECK: Neck supple. No adenopathy.   SKIN: No suspicious lesions or rashes  LYMPH: Normal cervical lymph nodes    ASSESSMENT:  Chasity Hodgson has a lump in the left axilla that was no visualized on imaging and has the texture of a benign lipoma.  On exam, I identified a small mass under the left areola at 2 o'clock that drained a little discharge when palpated.  An " ultrasound was performed and showed the mass to be a simple cyst next to a dilated duct.  Nothing concerning was seen.  It seems that this is probably a cyst of a Barksdale gland and is not worrisome as it is not inflamed or causing any problem.    We talked about breast cancer risk.  I explained to Sammie that obesity increases her risk for breast cancer.  Long term estrogen replacement is also considered to increase risk.  Sammie has been on estrogen for more than 30 years and isn't sure why she is still on it but is worried about getting night sweats if she stops taking it.  I recommended that she try weaning off of it and stopping it as soon as she is able.  Her risk is probably a bit elevated by her FH, but at age 71, I just recommended that she continue with routine annual screening (mammograms and clinical breast exams).    PLAN:  Sammie will try to wean off of the estrogen.  She will continue with routine annual screening and will follow up with me as needed.      Sneha Mayo MD    Please route or send letter to:  Primary Care Provider (PCP)  Dr. Susi Valladares

## 2017-03-20 NOTE — NURSING NOTE
Breast Patients    BREAST PATIENTS (ALL)    1-Do you have any of the following symptoms? Breast Pain and Lump(s) or Mass(es)  2-In which breast are you having the symptoms? left   3-Do you use hormones?  Yes  Type: Estradiol Dosage: 1mg daily   4-Have you had a Mammogram? Yes  Where: [facility]  Date: 3/3/17, 9/23/16  5-Have you ever had a breast cyst drained? No  6-Have you ever had a breast biopsy? Yes  Side: Left  Date: 2/12/10  7-Have you ever had a Breast Cancer? No   8-Is there a history of Breast Cancer in your family? Yes   Relationship to you:    Sister  niece  9-Have you ever had Ovarian Cancer? No  10-Is there a history of Ovarian Cancer in your family? No  11-Summarize your daily caffeine intake (i.e. coffee, tea, chocolate, soda etc.): Chocolate     BREAST PATIENTS (FEMALE)    12-What age did your periods begin? 14  13-Date your last menstrual period began? Hysterectomy age 36  14-Number of full-term pregnancies: 2  15-How many children do you have? 2  Ages 44, 47  15-Your age when your first child was born? 25  16-Did you nurse your children? Yes, 1 child   17-Are you pregnant now? No  18-Have you begun menopause? Hysterectomy age 36  19-Have you had either ovary removed?Yes  Date of Surgery:  1980  20-Do you have breast implants? No   22-Are you on birth control? No  23-What is your marital status?   24-Do you exercise? Yes 1-3 times/week  25-What is your occupation?  for [employer]     Magy Aquino MA

## 2017-03-20 NOTE — LETTER
2017    RE:  Chasity Hodgson-:  [birth year]    HISTORY OF PRESENT ILLNESS: Chasity Hodgson is a 71 year old female who is seen in consultation at the request of Dr. Susi Valladares for evaluation of a left breast lump.   Sammie reports that she incidentally felt the lump in the left axilla in December or January. She thought that it was a swollen lymph node because she was ill at the time. The lump was non-tender. The lump persisted, though, after she was no longer ill so she saw Dr. Valladares and was referred for diagnostic imaging. She had diagnostic mammograms and ultrasound which were negative. Sammie had a surgical left breast biopsy many years ago and 3 core needle biopsies, all of which were benign. She has noted no other breast changes.     Sammie reached menarche at age 14. She is  having had her first pregnancy at age 25. She  for about 4-5 months. Sammie had a hysterectomy and unilateral oohporectomy in  and had the other ovary removed in . She has been on estrogen replacement continuously since . Family history is significant for her sister being diagnosed with breast cancer at age 59. Her mother had a breast mass that was never worked up because she  from a stroke shortly after it was identified. There is also a maternal aunt and niece with breast cancer.     REVIEW OF SYSTEMS:  Constitutional: Negative for chills, fatigue, fever and weight change.  Eyes: Negative for blurred vision, eye pain and photophobia.  ENT: Negative for hearing problems, ENT pain, congestion, rhinorrhea, epistaxis, hoarseness and dental problems.  Cardiovascular: Negative for chest pain, palpitations, tachycardia, orthopnea and edema.  Respiratory: Negative for cough, dyspnea and hemoptysis.  Gastrointestinal: Negative for abdominal pain, heartburn, constipation, diarrhea and stool changes.  Musculoskeletal: Negative for arthralgias, back pain and myalgias.  Integumentary/Breast: See HPI.     Vitals: /84  "Pulse 60 Ht 5' 0.5\" (1.537 m) Wt 205 lb (93 kg) BMI 39.38 kg/m2  BMI= Body mass index is 39.38 kg/(m^2).     EXAM:  GENERAL: healthy, alert and no distress   BREAST:  The breasts appear symmetric with no overlying skin changes. The nipples are normal bilaterally. There is no dimpling or thickening of the skin.  A small, round, superficial mass is palpated deep the the left areola at 2 o'clock. With palpation of the mass, a small amount of discharge was noted from a Barksdale gland. There was no nipple discharge. A small, soft lump was appreciated in the left axilla near the edge of the breast that is well circumscribed and has the texture of a lipoma. The breast tissue is generally pretty soft and benign to palpation.  There is no axillary or supraclavicular lymphadenopathy.  CARDIOVASCULAR: No edema or JVD.  RESPIRATORY: negative findings: no chest deformities noted, no chest wall tenderness, breathing is unlabored.  NECK: Neck supple. No adenopathy.   SKIN: No suspicious lesions or rashes  LYMPH: Normal cervical lymph nodes     ASSESSMENT:  Chasity Hodgson has a lump in the left axilla that was no visualized on imaging and has the texture of a benign lipoma. On exam, I identified a small mass under the left areola at 2 o'clock that drained a little discharge when palpated. An ultrasound was performed and showed the mass to be a simple cyst next to a dilated duct. Nothing concerning was seen. It seems that this is probably a cyst of a Barksdale gland and is not worrisome as it is not inflamed or causing any problem.     We talked about breast cancer risk. I explained to Sammie that obesity increases her risk for breast cancer. Long term estrogen replacement is also considered to increase risk. Sammie has been on estrogen for more than 30 years and isn't sure why she is still on it but is worried about getting night sweats if she stops taking it. I recommended that she try weaning off of it and stopping it as soon as " she is able. Her risk is probably a bit elevated by her FH, but at age 71, I just recommended that she continue with routine annual screening (mammograms and clinical breast exams).     PLAN:  Sammie will try to wean off of the estrogen. She will continue with routine annual screening and will follow up with me as needed.        Sneha Mayo MD

## 2017-03-23 ENCOUNTER — TRANSFERRED RECORDS (OUTPATIENT)
Dept: HEALTH INFORMATION MANAGEMENT | Facility: CLINIC | Age: 72
End: 2017-03-23

## 2017-03-23 ENCOUNTER — TELEPHONE (OUTPATIENT)
Dept: DERMATOLOGY | Facility: CLINIC | Age: 72
End: 2017-03-23

## 2017-03-23 NOTE — TELEPHONE ENCOUNTER
----- Message from Stephanie Gallegos LPN sent at 3/22/2017  7:57 AM CDT -----  Can you please ask Dr. Karimi what she prefers, then call pt and let her know.    Thank you Stephanie   ----- Message -----     From: Veronica Marin     Sent: 3/21/2017   4:56 PM       To: Derm Triage-[facility]!    This patient is scheduled to see Dr. Meyer this Saturday. She is wondering if she needs to have her hair colored prior to seeing Dr. Meyer? She mentioned that she had her hair colored before her last appt and is wondering if it would be necessary to do again. You can reach her best at her home phone number.    Thanks!    Veronica

## 2017-03-25 ENCOUNTER — OFFICE VISIT (OUTPATIENT)
Dept: DERMATOLOGY | Facility: CLINIC | Age: 72
End: 2017-03-25

## 2017-03-25 VITALS — DIASTOLIC BLOOD PRESSURE: 78 MMHG | SYSTOLIC BLOOD PRESSURE: 147 MMHG | HEART RATE: 59 BPM

## 2017-03-25 DIAGNOSIS — L30.9 DERMATITIS: ICD-10-CM

## 2017-03-25 DIAGNOSIS — L71.9 ACNE ROSACEA: Primary | ICD-10-CM

## 2017-03-25 DIAGNOSIS — L23.5 ALLERGIC DERMATITIS DUE TO OTHER CHEMICAL PRODUCT: ICD-10-CM

## 2017-03-25 DIAGNOSIS — L65.9 ALOPECIA: ICD-10-CM

## 2017-03-25 ASSESSMENT — PAIN SCALES - GENERAL: PAINLEVEL: NO PAIN (0)

## 2017-03-25 NOTE — LETTER
"3/25/2017       RE: Chasity Hodgson  [address] [id number]     Dear Colleague,    Thank you for referring your patient, Chasity Hodgson, to the [facility] DERMATOLOGY at [facility]. Please see a copy of my visit note below.                      Pictures were placed in Pt's chart today for future reference.      [facility] Dermatology Note    Dermatology Problem List:  1. Female pattern hair loss  -currently treating with laser comb 3x per week and free and clear shampoo (given allergic contact dermatitis)      2. Rosacea  -currently treating with Vanicream   -referred to Dr. Poon's [facility] clinic for consideration of pulse dye or other  laser treatment     3. Venous stasis dermatitis  -continue to apply Vanicream daily  -previous treatment: has previously applied TAC 0.1% to areas that are pruritic and scaly      4. Allergic contact dermatitis   -patient uses fragrance-free products      Encounter Date: Mar 25, 2017    CC:  Chief Complaint   Patient presents with     Hair Loss     Hair loss follow up, Sammie states \" I think it s worse on the sides.\"     History of Present Illness:  Ms. Chasity Hodgson is a 71 year old female who presents as a follow-up for androgenetic alopecia. The patient was last seen 9/12/16 when her androgenetic alopecia was clinically improving. She is currently treating her hair loss with Free and Clear shampoo, and the HairMax lasercomb 3x per week.  At her last visit, a referral was placed to Dr. Poon at [facility] to discuss possible treatment options such as PDL for her rosacea.  She is still considering whether she want to pursue PDL treatment at this time or not.      Today, Ms. Hodgson states that her hair loss has been stable since her last visit.  She continues to notice diffuse thinning along her frontal scalp.  She denies scalp pain, burning or pruritus.  Since her last visit, Ms. Hodgson has noticed some " thinning along her bilateral eyebrows.  She denies hair loss elsewhere on her body or excess hair growth on face or body.  This winter, Ms. Hodgson experienced a sinus infection, and flu-like symptoms with nausea, vomiting, and diarrhea.  These ailments have since resolved. She denies an changes in her medications or past medical history since her last visit.      Past Medical History:   Patient Active Problem List   Diagnosis     Low back pain     Hyperlipidemia LDL goal <100     Benign essential hypertension     Fibromyalgia     Allergic rhinitis     ANNETTE (obstructive sleep apnea)     Cavovarus deformity of foot     History of DVT (deep vein thrombosis)     Hypokalemia     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)     Anemia due to blood loss, acute     Advanced directives, counseling/discussion     Postsurgical hypothyroidism     Type 2 diabetes, HbA1c goal < 7% (H)     Gastroparesis     Papillary carcinoma of thyroid (H)     Alopecia     Pulmonary nodule     Esophageal reflux     Dermatitis     Acne rosacea     Diabetic gastroparesis (H)     Poliomyelitis     Left knee pain     Carotid stenosis     Right shoulder pain     Type 2 diabetes mellitus with other specified complication (H)     Insufficiency, arterial, peripheral (H)     Allergic dermatitis due to other chemical product     Normocytic anemia     Past Medical History:   Diagnosis Date     Abdominal adhesions 1984, 96,99    s/p lysis     Allergic rhinitis, cause unspecified      Carotid stenosis      CPAP (continuous positive airway pressure) dependence      Diabetic gastroparesis (H)      Diet-controlled type 2 diabetes mellitus (H)      DVT of axillary vein, acute right (H)      Fibromyalgia      Gastro-oesophageal reflux disease      Hernia of unspecified site of abdominal cavity without mention of obstruction or gangrene     bilateral     HTN (hypertension)      Hypertriglyceridemia      Obstructive sleep apnea      ANNETTE (obstructive sleep apnea)      Papillary carcinoma of  thyroid (H)     s/p thyroidectomy - Ruegemer     PE (pulmonary embolism)      Poliomyelitis     poor circulation right leg     Postsurgical hypothyroidism     s/p papillary thryoid cancer - Ruegemer     Pulmonary embolism (H)      Rosacea      S/P carpal tunnel release     bilateral     S/P hardware removal 01/2014    still with lingering foot pain     S/P shoulder surgery     bilateral     Septic joint (H)     right knee     Venous insufficiency      Venous thrombosis 1999    right axillary vein     Past Surgical History:   Procedure Laterality Date     AMPUTATE TOE(S)  3/15/2012    Procedure:AMPUTATE TOE(S); Surgeon:SUKHDEEP METZ; Location: OR     APPENDECTOMY  1972     ARTHRODESIS FOOT  3/15/2012    Procedure:ARTHRODESIS FOOT; RIGHT TRIPLE ARTHRODESIS, FIFTH TOE AMPUTATION, LATERAL LIGAMENT RECONSTRUCTION, TENDON TRANSFER AND RELEASE [MINI C-ARM, ARTHREX 4.5 AND 6.7 STAPLES, BIOCOMPOSITE TENODESIS SCREWS]; Surgeon:SUKHDEEP METZ; Location: OR      FREEING BOWEL ADHESION,ENTEROLYSIS      1986, 1996, 1999     C NONSPECIFIC PROCEDURE      throidectomy     C TOTAL ABDOM HYSTERECTOMY  1980    + BSO     CHOLECYSTECTOMY       COLOSTOMY  2/7/2012    Procedure:COLOSTOMY; CREATION OF SIGMOID COLOSTOMY AND EXTENSIVE  LYSIS OF ADHESIONS; Surgeon:MONTSERRAT BENDER; Location: OR     GI SURGERY      weakened rectal sphincter with artificial stimulator     LAPAROTOMY, LYSIS ADHESIONS, COMBINED  2/7/2012    Procedure:COMBINED LAPAROTOMY, LYSIS ADHESIONS; Surgeon:MONTSERRAT BENDER; Location: OR     RELEASE TENDON FOOT  3/15/2012    Procedure:RELEASE TENDON FOOT; Surgeon:SUKHDEEP METZ; Location: OR     REMOVE HARDWARE FOOT  12/13/2012    Procedure: REMOVE HARDWARE FOOT;  RIGHT FOOT REMOVAL OF HARDWARE;  Surgeon: Sukhdeep Metz MD;  Location:  OR     Social History:  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.      Family History:  FPHL in patient's sister,  mom, and grandmother.     Medications:  Current Outpatient Prescriptions   Medication Sig Dispense Refill     Pantoprazole Sodium (PROTONIX PO) Take 40 mg by mouth       fluticasone (FLONASE) 50 MCG/ACT spray Spray 2 sprays in nostril daily 2 sprays in each nostril qd 1 Bottle 0     gabapentin (NEURONTIN) 100 MG capsule Take 1 capsule (100 mg) by mouth every evening as needed 90 capsule 3     fenofibrate 145 MG tablet Take 1 tablet (145 mg) by mouth daily 90 tablet 2     atorvastatin (LIPITOR) 20 MG tablet Take 1 tablet (20 mg) by mouth daily 90 tablet 2     hydrochlorothiazide (HYDRODIURIL) 25 MG tablet Take 1 tablet (25 mg) by mouth daily 90 tablet 2     lisinopril (PRINIVIL/ZESTRIL) 20 MG tablet Take 1 tablet (20 mg) by mouth daily 90 tablet 2     atenolol (TENORMIN) 50 MG tablet Take 1 tablet (50 mg) by mouth 2 times daily 180 tablet 2     esomeprazole (NEXIUM) 40 MG capsule Take 40 mg by mouth 2 times daily Take 30-60 minutes before eating.       metFORMIN (GLUCOPHAGE) 500 MG tablet Take 500 mg by mouth 2 times daily (with meals) Reported on 2/14/2017       order for DME Equipment being ordered: Compression stockings - Knee High; 20-30 mmHg compression 2 each 0     fluticasone (FLOVENT DISKUS) 50 MCG/BLIST AEPB Inhale 1 puff into the lungs every 12 hours       order for DME Equipment being ordered: Oral appliance for sleep apnea 1 Units 0     cycloSPORINE (RESTASIS) 0.05 % ophthalmic emulsion 1 drop 2 times daily       sitagliptin (JANUVIA) 50 MG tablet Take 50 mg by mouth daily       Cholecalciferol (VITAMIN D-3 PO) Take 2 tablets by mouth       BIOTIN PO Take 1,000 mcg by mouth       albuterol (PROAIR HFA, PROVENTIL HFA, VENTOLIN HFA) 108 (90 BASE) MCG/ACT inhaler Inhale 2 puffs into the lungs every 6 hours as needed for shortness of breath / dyspnea or wheezing 1 Inhaler 1     blood glucose monitoring (NO BRAND SPECIFIED) test strip Use to test blood sugar 1 times daily or as directed. 1 Box 6     ranitidine  (ZANTAC) 300 MG tablet Take by mouth At Bedtime Reported on 2/14/2017 30 tablet      sucralfate (CARAFATE) 1 GM/10ML suspension Take 10 mLs by mouth 3 times daily as needed       estradiol (ESTRACE) 1 MG tablet Take 1 mg by mouth daily       nystatin-triamcinolone (MYCOLOG II) cream Apply topically daily as needed       ascorbic acid (VITAMIN C) 1000 MG TABS Take 1,000 mg by mouth daily       Calcium Citrate-Vitamin D (CALCIUM CITRATE + D PO) Take 2 tablets by mouth daily        ondansetron (ZOFRAN) 4 MG tablet Take 1 tablet by mouth every 6 hours as needed Reported on 3/20/2017       aspirin (SB LOW DOSE ASA EC) 81 MG EC tablet Take 81 mg by mouth daily       emollient (VANICREAM) cream Apply twice daily as needed to face. Mix half and half with 1% hydrocortisone 60 g 1     nystatin (MYCOSTATIN) cream Apply topically daily as needed        levothyroxine (SYNTHROID) 112 MCG tablet Take 112 mcg by mouth daily Take 112 mcg daily except for Friday take 56 mcg.  Brand name Synthroid       diphenhydrAMINE-acetaminophen (TYLENOL PM)  MG tablet Take 1 tablet by mouth At Bedtime Reported on 3/20/2017       ferrous sulfate 325 (65 FE) MG tablet Take 1 tablet by mouth daily (with breakfast).       fexofenadine (ALLEGRA) 180 MG tablet Take 180 mg by mouth daily. 120 0     MULTIVITAMINS OR TABS ONE DAILY 100 3     Allergies   Allergen Reactions     Nsaids Difficulty breathing     Increased creatinine     Toradol Difficulty breathing     Shortness of breath     Celecoxib Itching and Rash     Codeine Itching     With higher doses     No Clinical Screening - See Comments Itching     Fragrance     Vioxx Other (See Comments)     Heart races     Conjugated Estrogens Rash     Sulfa Drugs Rash     Review of Systems:  -Constitutional: The patient denies fatigue, fevers, chills, unintended weight loss, and night sweats.  -HEENT: Patient denies nonhealing oral sores.  -Skin: As above in HPI. No additional skin concerns.    Physical  exam:  Vitals: /78  Pulse 59  GEN: This is a well developed, well-nourished female in no acute distress, in a pleasant mood.    SKIN: Focused examination of the face, scalp and fingernails was performed.  -focal patchy pinkness present along frontal scalp  -mild perifollicular scale present along frontal scalp  -midline part width 1.2, Savin 3  -regrowth layers:   1st layer: 1-2 cm fibers   2nd layer: 4-5 cm fibers with evidence of hair product present at the end of the   hair fibers   3rd layer: cut layer   -decreased hair fiber density along medial aspect of bilateral eyebrows  -no hair loss present on eyelashes  -mild longitudinal ridging present on bilateral fingernails  -confluent violaceous and dark brown patches present on bilateral lower extremities with no evidence of open sores or ulcers present  -No other lesions of concern on areas examined.     Impression/Plan:  1. Androgenetic alopecia: appears clinically stable when compared to photos from previous visits.  She continues to experience thinning along midline part width and frontal scalp with a Chen tree pattern present on examination.      Continue to use Free and Clear shampoo every other day    Continue photobiomodulation device 3x per week    Recommended adding fragrance-free (given ACD) Rogaine to her treatment regime. Informed Ms. Hodgson that she can also use the Rogaine on her eyebrows as well to promote hair regrowth.    Photographs taken for future reference     2. Venous stasis: violaceous and dark brown patches present on bilateral lower extremities with no evidence of open sores or ulcers present on examination.      Continue to wear compression stalking daily    Continue to apply Vanicream to bilateral lower extremities at least once per day.     Follow-up in 6 months, earlier for new or changing lesions.     Staff Involved:  Scribed by Melody Elizabeth, MS4 for Dr. Meyer.        I agree with the PFSH and ROS as completed by the  Medical Student. The remainder of the encounter was performed by me and scribed by the Medical Student. The scribed note accurately reflects my personal services and the medical decisions made by me.      Renetta Meyer MD  Professor and Chair  Department of Dermatology  [facility]

## 2017-03-25 NOTE — NURSING NOTE
"Dermatology Rooming Note    Chasity Hodgson's goals for this visit include:   Chief Complaint   Patient presents with     Hair Loss     Hair loss follow up, Sammie states \" I think it s worse on the sides.\"     Yissel Patterson LPN  "

## 2017-03-25 NOTE — PROGRESS NOTES
"[facility] Dermatology Note    Dermatology Problem List:  1. Female pattern hair loss  -currently treating with laser comb 3x per week and free and clear shampoo (given allergic contact dermatitis)      2. Rosacea  -currently treating with Vanicream   -referred to Dr. Poon's [place] clinic for consideration of pulse dye or other  laser treatment     3. Venous stasis dermatitis  -continue to apply Vanicream daily  -previous treatment: has previously applied TAC 0.1% to areas that are pruritic and scaly      4. Allergic contact dermatitis   -patient uses fragrance-free products      Encounter Date: Mar 25, 2017    CC:  Chief Complaint   Patient presents with     Hair Loss     Hair loss follow up, Sammie states \" I think it s worse on the sides.\"     History of Present Illness:  Ms. Chasity Hodgson is a 71 year old female who presents as a follow-up for androgenetic alopecia. The patient was last seen 9/12/16 when her androgenetic alopecia was clinically improving. She is currently treating her hair loss with Free and Clear shampoo, and the HairMax lasercomb 3x per week.  At her last visit, a referral was placed to Dr. Poon at [place] to discuss possible treatment options such as PDL for her rosacea.  She is still considering whether she want to pursue PDL treatment at this time or not.      Today, Ms. Hodgson states that her hair loss has been stable since her last visit.  She continues to notice diffuse thinning along her frontal scalp.  She denies scalp pain, burning or pruritus.  Since her last visit, Ms. Hodgson has noticed some thinning along her bilateral eyebrows.  She denies hair loss elsewhere on her body or excess hair growth on face or body.  This winter, Ms. Hodgson experienced a sinus infection, and flu-like symptoms with nausea, vomiting, and diarrhea.  These ailments have since resolved. She denies an changes in her medications or past medical history since her last visit.      Past " Medical History:   Patient Active Problem List   Diagnosis     Low back pain     Hyperlipidemia LDL goal <100     Benign essential hypertension     Fibromyalgia     Allergic rhinitis     ANNETTE (obstructive sleep apnea)     Cavovarus deformity of foot     History of DVT (deep vein thrombosis)     Hypokalemia     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)     Anemia due to blood loss, acute     Advanced directives, counseling/discussion     Postsurgical hypothyroidism     Type 2 diabetes, HbA1c goal < 7% (H)     Gastroparesis     Papillary carcinoma of thyroid (H)     Alopecia     Pulmonary nodule     Esophageal reflux     Dermatitis     Acne rosacea     Diabetic gastroparesis (H)     Poliomyelitis     Left knee pain     Carotid stenosis     Right shoulder pain     Type 2 diabetes mellitus with other specified complication (H)     Insufficiency, arterial, peripheral (H)     Allergic dermatitis due to other chemical product     Normocytic anemia     Past Medical History:   Diagnosis Date     Abdominal adhesions 1984, 96,99    s/p lysis     Allergic rhinitis, cause unspecified      Carotid stenosis      CPAP (continuous positive airway pressure) dependence      Diabetic gastroparesis (H)      Diet-controlled type 2 diabetes mellitus (H)      DVT of axillary vein, acute right (H)      Fibromyalgia      Gastro-oesophageal reflux disease      Hernia of unspecified site of abdominal cavity without mention of obstruction or gangrene     bilateral     HTN (hypertension)      Hypertriglyceridemia      Obstructive sleep apnea      ANNETTE (obstructive sleep apnea)      Papillary carcinoma of thyroid (H)     s/p thyroidectomy - Ruegemer     PE (pulmonary embolism)      Poliomyelitis     poor circulation right leg     Postsurgical hypothyroidism     s/p papillary thryoid cancer - Ruegemer     Pulmonary embolism (H)      Rosacea      S/P carpal tunnel release     bilateral     S/P hardware removal 01/2014    still with lingering foot pain     S/P shoulder  surgery     bilateral     Septic joint (H)     right knee     Venous insufficiency      Venous thrombosis 1999    right axillary vein     Past Surgical History:   Procedure Laterality Date     AMPUTATE TOE(S)  3/15/2012    Procedure:AMPUTATE TOE(S); Surgeon:SUKHDEEP METZ; Location: OR     APPENDECTOMY  1972     ARTHRODESIS FOOT  3/15/2012    Procedure:ARTHRODESIS FOOT; RIGHT TRIPLE ARTHRODESIS, FIFTH TOE AMPUTATION, LATERAL LIGAMENT RECONSTRUCTION, TENDON TRANSFER AND RELEASE [MINI C-ARM, ARTHREX 4.5 AND 6.7 STAPLES, BIOCOMPOSITE TENODESIS SCREWS]; Surgeon:SUKHDEEP METZ; Location: OR     C FREEING BOWEL ADHESION,ENTEROLYSIS      1986, 1996, 1999     C NONSPECIFIC PROCEDURE      throidectomy     C TOTAL ABDOM HYSTERECTOMY  1980    + BSO     CHOLECYSTECTOMY       COLOSTOMY  2/7/2012    Procedure:COLOSTOMY; CREATION OF SIGMOID COLOSTOMY AND EXTENSIVE  LYSIS OF ADHESIONS; Surgeon:MONTSERRAT BENDER; Location: OR     GI SURGERY      weakened rectal sphincter with artificial stimulator     LAPAROTOMY, LYSIS ADHESIONS, COMBINED  2/7/2012    Procedure:COMBINED LAPAROTOMY, LYSIS ADHESIONS; Surgeon:MONTSERRAT BENDER; Location: OR     RELEASE TENDON FOOT  3/15/2012    Procedure:RELEASE TENDON FOOT; Surgeon:SUKHDEEP METZ; Location: OR     REMOVE HARDWARE FOOT  12/13/2012    Procedure: REMOVE HARDWARE FOOT;  RIGHT FOOT REMOVAL OF HARDWARE;  Surgeon: Sukhdeep Metz MD;  Location:  OR     Social History:  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      Family History:  FPHL in patient's sister, mom, and grandmother.     Medications:  Current Outpatient Prescriptions   Medication Sig Dispense Refill     Pantoprazole Sodium (PROTONIX PO) Take 40 mg by mouth       fluticasone (FLONASE) 50 MCG/ACT spray Spray 2 sprays in nostril daily 2 sprays in each nostril qd 1 Bottle 0     gabapentin (NEURONTIN) 100 MG capsule Take 1 capsule (100 mg) by mouth every evening  as needed 90 capsule 3     fenofibrate 145 MG tablet Take 1 tablet (145 mg) by mouth daily 90 tablet 2     atorvastatin (LIPITOR) 20 MG tablet Take 1 tablet (20 mg) by mouth daily 90 tablet 2     hydrochlorothiazide (HYDRODIURIL) 25 MG tablet Take 1 tablet (25 mg) by mouth daily 90 tablet 2     lisinopril (PRINIVIL/ZESTRIL) 20 MG tablet Take 1 tablet (20 mg) by mouth daily 90 tablet 2     atenolol (TENORMIN) 50 MG tablet Take 1 tablet (50 mg) by mouth 2 times daily 180 tablet 2     esomeprazole (NEXIUM) 40 MG capsule Take 40 mg by mouth 2 times daily Take 30-60 minutes before eating.       metFORMIN (GLUCOPHAGE) 500 MG tablet Take 500 mg by mouth 2 times daily (with meals) Reported on 2/14/2017       order for DME Equipment being ordered: Compression stockings - Knee High; 20-30 mmHg compression 2 each 0     fluticasone (FLOVENT DISKUS) 50 MCG/BLIST AEPB Inhale 1 puff into the lungs every 12 hours       order for DME Equipment being ordered: Oral appliance for sleep apnea 1 Units 0     cycloSPORINE (RESTASIS) 0.05 % ophthalmic emulsion 1 drop 2 times daily       sitagliptin (JANUVIA) 50 MG tablet Take 50 mg by mouth daily       Cholecalciferol (VITAMIN D-3 PO) Take 2 tablets by mouth       BIOTIN PO Take 1,000 mcg by mouth       albuterol (PROAIR HFA, PROVENTIL HFA, VENTOLIN HFA) 108 (90 BASE) MCG/ACT inhaler Inhale 2 puffs into the lungs every 6 hours as needed for shortness of breath / dyspnea or wheezing 1 Inhaler 1     blood glucose monitoring (NO BRAND SPECIFIED) test strip Use to test blood sugar 1 times daily or as directed. 1 Box 6     ranitidine (ZANTAC) 300 MG tablet Take by mouth At Bedtime Reported on 2/14/2017 30 tablet      sucralfate (CARAFATE) 1 GM/10ML suspension Take 10 mLs by mouth 3 times daily as needed       estradiol (ESTRACE) 1 MG tablet Take 1 mg by mouth daily       nystatin-triamcinolone (MYCOLOG II) cream Apply topically daily as needed       ascorbic acid (VITAMIN C) 1000 MG TABS Take  1,000 mg by mouth daily       Calcium Citrate-Vitamin D (CALCIUM CITRATE + D PO) Take 2 tablets by mouth daily        ondansetron (ZOFRAN) 4 MG tablet Take 1 tablet by mouth every 6 hours as needed Reported on 3/20/2017       aspirin (SB LOW DOSE ASA EC) 81 MG EC tablet Take 81 mg by mouth daily       emollient (VANICREAM) cream Apply twice daily as needed to face. Mix half and half with 1% hydrocortisone 60 g 1     nystatin (MYCOSTATIN) cream Apply topically daily as needed        levothyroxine (SYNTHROID) 112 MCG tablet Take 112 mcg by mouth daily Take 112 mcg daily except for Friday take 56 mcg.  Brand name Synthroid       diphenhydrAMINE-acetaminophen (TYLENOL PM)  MG tablet Take 1 tablet by mouth At Bedtime Reported on 3/20/2017       ferrous sulfate 325 (65 FE) MG tablet Take 1 tablet by mouth daily (with breakfast).       fexofenadine (ALLEGRA) 180 MG tablet Take 180 mg by mouth daily. 120 0     MULTIVITAMINS OR TABS ONE DAILY 100 3     Allergies   Allergen Reactions     Nsaids Difficulty breathing     Increased creatinine     Toradol Difficulty breathing     Shortness of breath     Celecoxib Itching and Rash     Codeine Itching     With higher doses     No Clinical Screening - See Comments Itching     Fragrance     Vioxx Other (See Comments)     Heart races     Conjugated Estrogens Rash     Sulfa Drugs Rash     Review of Systems:  -Constitutional: The patient denies fatigue, fevers, chills, unintended weight loss, and night sweats.  -HEENT: Patient denies nonhealing oral sores.  -Skin: As above in HPI. No additional skin concerns.    Physical exam:  Vitals: /78  Pulse 59  GEN: This is a well developed, well-nourished female in no acute distress, in a pleasant mood.    SKIN: Focused examination of the face, scalp and fingernails was performed.  -focal patchy pinkness present along frontal scalp  -mild perifollicular scale present along frontal scalp  -midline part width 1.2, Savin 3  -regrowth  layers:   1st layer: 1-2 cm fibers   2nd layer: 4-5 cm fibers with evidence of hair product present at the end of the   hair fibers   3rd layer: cut layer   -decreased hair fiber density along medial aspect of bilateral eyebrows  -no hair loss present on eyelashes  -mild longitudinal ridging present on bilateral fingernails  -confluent violaceous and dark brown patches present on bilateral lower extremities with no evidence of open sores or ulcers present  -No other lesions of concern on areas examined.     Impression/Plan:  1. Androgenetic alopecia: appears clinically stable when compared to photos from previous visits.  She continues to experience thinning along midline part width and frontal scalp with a Reading tree pattern present on examination.      Continue to use Free and Clear shampoo every other day    Continue photobiomodulation device 3x per week    Recommended adding fragrance-free (given ACD) Rogaine to her treatment regime. Informed Ms. Hodgson that she can also use the Rogaine on her eyebrows as well to promote hair regrowth.    Photographs taken for future reference     2. Venous stasis: violaceous and dark brown patches present on bilateral lower extremities with no evidence of open sores or ulcers present on examination.      Continue to wear compression stalking daily    Continue to apply Vanicream to bilateral lower extremities at least once per day.     Follow-up in 6 months, earlier for new or changing lesions.     Staff Involved:  Scribed by Melody Elizabeth, MS4 for Dr. Meyer.        I agree with the PFSH and ROS as completed by the Medical Student. The remainder of the encounter was performed by me and scribed by the Medical Student. The scribed note accurately reflects my personal services and the medical decisions made by me.      Renetta Meyer MD  Professor and Chair  Department of Dermatology  [facility]

## 2017-03-25 NOTE — MR AVS SNAPSHOT
After Visit Summary   3/25/2017    Chasity Hodgson    MRN: [id number]           Patient Information     Date Of Birth          1945        Visit Information        Provider Department      3/25/2017 9:15 AM Renetta Meyer MD [facility] Dermatology        Today's Diagnoses     Acne rosacea    -  1    Allergic dermatitis due to other chemical product        Alopecia        Dermatitis           Follow-ups after your visit        Who to contact     Please call your clinic at [phone] to:    Ask questions about your health    Make or cancel appointments    Discuss your medicines    Learn about your test results    Speak to your doctor   If you have compliments or concerns about an experience at your clinic, or if you wish to file a complaint, please contact [facility] Patient Relations at [phone] or email us at [email or web address]         Additional Information About Your Visit        MyChart Information     [employer] gives you secure access to your electronic health record. If you see a primary care provider, you can also send messages to your care team and make appointments. If you have questions, please call your primary care clinic.  If you do not have a primary care provider, please call [phone] and they will assist you.      [employer] is an electronic gateway that provides easy, online access to your medical records. With [employer], you can request a clinic appointment, read your test results, renew a prescription or communicate with your care team.     To access your existing account, please contact your [facility] or call [phone] for assistance.        Care EveryWhere ID     This is your Care EveryWhere ID. This could be used by other organizations to access your [facility] medical records  [id number]        Your Vitals Were     Pulse                   59            Blood Pressure from Last 3  Encounters:   03/25/17 147/78   03/20/17 122/84   02/14/17 136/78    Weight from Last 3 Encounters:   03/20/17 93 kg (205 lb)   02/14/17 93.9 kg (207 lb)   01/16/17 93.4 kg (206 lb)              Today, you had the following     No orders found for display       Primary Care Provider Office Phone # Fax #    Shola Benoit [phone]       [facility] [address] [place] [facility]   [facility] [address]        Thank you!     Thank you for choosing [facility] DERMATOLOGY  for your care. Our goal is always to provide you with excellent care. Hearing back from our patients is one way we can continue to improve our services. Please take a few minutes to complete the written survey that you may receive in the mail after your visit with us. Thank you!             Your Updated Medication List - Protect others around you: Learn how to safely use, store and throw away your medicines at www.disposemymeds.org.          This list is accurate as of: 3/25/17 11:59 PM.  Always use your most recent med list.                   Brand Name Dispense Instructions for use    albuterol 108 (90 BASE) MCG/ACT Inhaler    PROAIR HFA/PROVENTIL HFA/VENTOLIN HFA    1 Inhaler    Inhale 2 puffs into the lungs every 6 hours as needed for shortness of breath / dyspnea or wheezing       ascorbic acid 1000 MG Tabs    vitamin C     Take 1,000 mg by mouth daily       atenolol 50 MG tablet    TENORMIN    180 tablet    Take 1 tablet (50 mg) by mouth 2 times daily       atorvastatin 20 MG tablet    LIPITOR    90 tablet    Take 1 tablet (20 mg) by mouth daily       BIOTIN PO      Take 1,000 mcg by mouth       blood glucose monitoring test strip    no brand specified    1 Box    Use to test blood sugar 1 times daily or as directed.       CALCIUM CITRATE + D PO      Take 2 tablets by mouth daily       cycloSPORINE 0.05 % ophthalmic emulsion    RESTASIS     1 drop 2 times daily       diphenhydrAMINE-acetaminophen  MG tablet     TYLENOL PM     Take 1 tablet by mouth At Bedtime Reported on 3/20/2017       emollient cream     60 g    Apply twice daily as needed to face. Mix half and half with 1% hydrocortisone       esomeprazole 40 MG CR capsule    nexIUM     Take 40 mg by mouth 2 times daily Take 30-60 minutes before eating.       estradiol 1 MG tablet    ESTRACE     Take 1 mg by mouth daily       fenofibrate 145 MG tablet     90 tablet    Take 1 tablet (145 mg) by mouth daily       ferrous sulfate 325 (65 FE) MG tablet    IRON     Take 1 tablet by mouth daily (with breakfast).       fexofenadine 180 MG tablet    ALLEGRA    120    Take 180 mg by mouth daily.       fluticasone 50 MCG/ACT spray    FLONASE    1 Bottle    Spray 2 sprays in nostril daily 2 sprays in each nostril qd       fluticasone 50 MCG/BLIST Aepb    FLOVENT DISKUS     Inhale 1 puff into the lungs every 12 hours       gabapentin 100 MG capsule    NEURONTIN    90 capsule    Take 1 capsule (100 mg) by mouth every evening as needed       hydrochlorothiazide 25 MG tablet    HYDRODIURIL    90 tablet    Take 1 tablet (25 mg) by mouth daily       lisinopril 20 MG tablet    PRINIVIL/ZESTRIL    90 tablet    Take 1 tablet (20 mg) by mouth daily       metFORMIN 500 MG tablet    GLUCOPHAGE     Take 500 mg by mouth 2 times daily (with meals) Reported on 2/14/2017       multivitamin per tablet     100    ONE DAILY       nystatin cream    MYCOSTATIN     Apply topically daily as needed       nystatin-triamcinolone cream    MYCOLOG II     Apply topically daily as needed       ondansetron 4 MG tablet    ZOFRAN     Take 1 tablet by mouth every 6 hours as needed Reported on 3/20/2017       * order for DME     1 Units    Equipment being ordered: Oral appliance for sleep apnea       * order for DME     2 each    Equipment being ordered: Compression stockings - Knee High; 20-30 mmHg compression       PROTONIX PO      Take 40 mg by mouth       ranitidine 300 MG tablet    ZANTAC    30 tablet    Take  by mouth At Bedtime Reported on 2/14/2017       SB LOW DOSE ASA EC 81 MG EC tablet   Generic drug:  aspirin      Take 81 mg by mouth daily       sitagliptin 50 MG tablet    JANUVIA     Take 50 mg by mouth daily       sucralfate 1 GM/10ML suspension    CARAFATE     Take 10 mLs by mouth 3 times daily as needed       SYNTHROID 112 MCG tablet   Generic drug:  levothyroxine      Take 112 mcg by mouth daily Take 112 mcg daily except for Friday take 56 mcg.  Brand name Synthroid       VITAMIN D-3 PO      Take 2 tablets by mouth       * Notice:  This list has 2 medication(s) that are the same as other medications prescribed for you. Read the directions carefully, and ask your doctor or other care provider to review them with you.

## 2017-04-18 ENCOUNTER — TELEPHONE (OUTPATIENT)
Dept: FAMILY MEDICINE | Facility: CLINIC | Age: 72
End: 2017-04-18

## 2017-04-18 NOTE — TELEPHONE ENCOUNTER
OK for trial off of atorvastatin and we should try to resume this in 2 weeks once symptoms resolve.      Shola Benoit MD

## 2017-04-18 NOTE — TELEPHONE ENCOUNTER
Pt reporting on and off leg cramps in left/good leg for the past few months at bedtime, and had incident of almost continuous leg cramps last night for 10-15 minutes.   Pt wondering if this could be related to atorvastatin?  Pt would be agreeable to trial off statin to see if cramping improved.  No other sx such as redness/swelling reported.    Please advise if ok to trial off statin - or needing to be seen?  Darya Zamarripa RN

## 2017-04-18 NOTE — TELEPHONE ENCOUNTER
Reason for Call:  Having Charley Horses    Detailed comments: Patient having Charley horses and she was wondering  If her Atorvastatin is causing this? Please call to discuss    Phone Number Patient can be reached at: Home number on file [phone] (home)    Best Time: anytime    Can we leave a detailed message on this number? YES    Call taken on 4/18/2017 at 11:49 AM by Jimmie Gabriel

## 2017-04-18 NOTE — TELEPHONE ENCOUNTER
Advised pt of below plan to trial off statin, and to call back in 2 weeks to update PCP on sx.  From there can look into other options including doing the trial back on statin to see what sx are.  If no resolution to cramps when off statin, will need OV   Darya Zamarripa RN

## 2017-05-02 NOTE — TELEPHONE ENCOUNTER
PCP:    Patient stopped atorvastatin for 2 weeks and the leg cramping completely went away a few days after the medication was stopped.  She continues to have pain in the knee, but states that she has arthritis in the knee.    Wants to know if she should go back on the medication to see if the cramping returns.    Please advise  Thank you    Beryl Leal RN

## 2017-05-02 NOTE — TELEPHONE ENCOUNTER
Patient called and asked to speak to someone about the below  Candido pt at # [phone] ok to lv a detailed msg    Thank you

## 2017-05-03 ENCOUNTER — PRE VISIT (OUTPATIENT)
Dept: CARDIOLOGY | Facility: CLINIC | Age: 72
End: 2017-05-03

## 2017-05-03 DIAGNOSIS — R00.2 PALPITATIONS: Primary | ICD-10-CM

## 2017-05-03 NOTE — TELEPHONE ENCOUNTER
Call to patient.  No answer, left detailed message informing of provider message below.  Advised call back if further questions.  Ivana Dubon RN

## 2017-05-03 NOTE — TELEPHONE ENCOUNTER
Spoke with patient to review Dr. Ortiz's recommendation for 48 hr holter before her OV. Patient verbalized understanding and agreed with plan. Order placed and patient connected to scheduling

## 2017-05-03 NOTE — TELEPHONE ENCOUNTER
Recommend 48 hour Holter monitor. She can see me or if no appointments, Kristin GARCIA NP.  I am very sorry about her dog.  CD

## 2017-05-03 NOTE — TELEPHONE ENCOUNTER
Patient scheduled self for 10 month visit telling scheduling she was having intermittent chest discomfort. Attempted to contact patient to clarify description and consider pre-visit testing, left message for patient to call back

## 2017-05-03 NOTE — TELEPHONE ENCOUNTER
Yes, I would advise her to resume atorvastatin and also add in coenzyme Q-10 over the counter if she is not already taking this.    Shola Benoit MD

## 2017-05-03 NOTE — TELEPHONE ENCOUNTER
"Spoke with patient to review request for OV due to chest discomfort. Patient states she noticed this feeling 3-4x a month for the last year, usually on her way to work and thought it was stress-related or her stomach issues. She describes the episodes as an ache under her left breast that can last a few minutes to several hours. She denies any dyspnea with this. Patient states since the weekend she has had daily episodes, noting that they had to have their dog put down on Saturday. Patient states most of the day into Sunday she felt \"like I had a broken heart\". Monday she felt a short episode but also notes a \"strange rhythm\" with the feeling. She states she ignores the discomfort because it eventually goes away. Patient states she cannot trigger the episodes with movement or exercise although her movement is limited due right leg paralysis from polio. She states she has had no nighttime episodes and sleeps well. Reviewed with patient that Dr. Ortiz would be updated in case testing is needed before her OV 5/11/17.  Will message Dr. Ortiz to review  "

## 2017-05-04 DIAGNOSIS — I10 BENIGN ESSENTIAL HYPERTENSION: Primary | ICD-10-CM

## 2017-05-05 ENCOUNTER — HOSPITAL ENCOUNTER (OUTPATIENT)
Dept: CARDIOLOGY | Facility: CLINIC | Age: 72
Discharge: HOME | End: 2017-05-05
Attending: INTERNAL MEDICINE | Admitting: INTERNAL MEDICINE
Payer: MEDICARE

## 2017-05-05 DIAGNOSIS — R00.2 PALPITATIONS: ICD-10-CM

## 2017-05-05 PROCEDURE — 93227 XTRNL ECG REC<48 HR R&I: CPT | Performed by: INTERNAL MEDICINE

## 2017-05-05 PROCEDURE — 93226 XTRNL ECG REC<48 HR SCAN A/R: CPT | Performed by: INTERNAL MEDICINE

## 2017-05-11 ENCOUNTER — OFFICE VISIT (OUTPATIENT)
Dept: CARDIOLOGY | Facility: CLINIC | Age: 72
End: 2017-05-11
Attending: INTERNAL MEDICINE
Payer: MEDICARE

## 2017-05-11 VITALS
SYSTOLIC BLOOD PRESSURE: 132 MMHG | DIASTOLIC BLOOD PRESSURE: 60 MMHG | WEIGHT: 209.2 LBS | HEART RATE: 62 BPM | HEIGHT: 62 IN | BODY MASS INDEX: 38.5 KG/M2

## 2017-05-11 DIAGNOSIS — R60.1 GENERALIZED EDEMA: ICD-10-CM

## 2017-05-11 DIAGNOSIS — R07.89 ATYPICAL CHEST PAIN: Primary | ICD-10-CM

## 2017-05-11 PROCEDURE — 99214 OFFICE O/P EST MOD 30 MIN: CPT | Mod: 25 | Performed by: INTERNAL MEDICINE

## 2017-05-11 PROCEDURE — 93000 ELECTROCARDIOGRAM COMPLETE: CPT | Performed by: INTERNAL MEDICINE

## 2017-05-11 RX ORDER — METOCLOPRAMIDE 10 MG/1
TABLET ORAL
COMMUNITY
End: 2018-11-02

## 2017-05-11 NOTE — MR AVS SNAPSHOT
After Visit Summary   5/11/2017    Chasity Hodgson    MRN: [id number]           Patient Information     Date Of Birth          1945        Visit Information        Provider Department      5/11/2017 3:45 PM Andreea Ortiz MD [facility]        Today's Diagnoses     Atypical chest pain    -  1    Generalized edema          Care Instructions    1. Coenzyme Q10 100 mg daily can reduce the aching/cramping from statin drugs.  It is an over-the-counter drug.    2. Rosuvastatin is an alternative cholesterol medication.    3.  If you have more nausea and aching, we can do an echo stress test - call us at [phone].        Follow-ups after your visit        Additional Services     Follow-Up with Cardiologist                 Future tests that were ordered for you today     Open Future Orders        Priority Expected Expires Ordered    Follow-Up with Cardiologist Routine 11/7/2017 5/11/2018 5/11/2017            Who to contact     If you have questions or need follow up information about today's clinic visit or your schedule please contact [facility] directly at [phone].  Normal or non-critical lab and imaging results will be communicated to you by [employer], letter or phone within 4 business days after the clinic has received the results. If you do not hear from us within 7 days, please contact the clinic through mktgt or phone. If you have a critical or abnormal lab result, we will notify you by phone as soon as possible.  Submit refill requests through [employer] or call your pharmacy and they will forward the refill request to us. Please allow 3 business days for your refill to be completed.          Additional Information About Your Visit        [employer] Information     [employer] gives you secure access to your electronic health record. If you see a primary care provider, you can also send messages to your care team and  "make appointments. If you have questions, please call your primary care clinic.  If you do not have a primary care provider, please call [phone] and they will assist you.        Care EveryWhere ID     This is your Care EveryWhere ID. This could be used by other organizations to access your [facility] medical records  [id number]        Your Vitals Were     Pulse Height BMI (Body Mass Index)             62 1.562 m (5' 1.5\") 38.89 kg/m2          Blood Pressure from Last 3 Encounters:   05/11/17 132/60   03/25/17 147/78   03/20/17 122/84    Weight from Last 3 Encounters:   05/11/17 94.9 kg (209 lb 3.2 oz)   03/20/17 93 kg (205 lb)   02/14/17 93.9 kg (207 lb)              We Performed the Following     EKG 12-lead complete w/read - Clinics (performed today)     Follow-Up with Cardiologist          Today's Medication Changes          These changes are accurate as of: 5/11/17  4:42 PM.  If you have any questions, ask your nurse or doctor.               These medicines have changed or have updated prescriptions.        Dose/Directions    metFORMIN 500 MG tablet   Commonly known as:  GLUCOPHAGE   This may have changed:  Another medication with the same name was removed. Continue taking this medication, and follow the directions you see here.   Changed by:  Andreea Ortiz MD        Dose:  500 mg   Take 500 mg by mouth daily (with dinner)   Refills:  0         Stop taking these medicines if you haven't already. Please contact your care team if you have questions.     PROTONIX PO   Stopped by:  Andreea Ortiz MD                    Primary Care Provider Office Phone # Fax #    Shola Leonides Benoit [phone]       [facility] [address]  [facility] [address]        Thank you!     Thank you for choosing [facility] [place]  for your care. Our goal is always to provide you with excellent care. Hearing back from our patients is one way we can " continue to improve our services. Please take a few minutes to complete the written survey that you may receive in the mail after your visit with us. Thank you!             Your Updated Medication List - Protect others around you: Learn how to safely use, store and throw away your medicines at www.disposemymeds.org.          This list is accurate as of: 5/11/17  4:42 PM.  Always use your most recent med list.                   Brand Name Dispense Instructions for use    albuterol 108 (90 BASE) MCG/ACT Inhaler    PROAIR HFA/PROVENTIL HFA/VENTOLIN HFA    1 Inhaler    Inhale 2 puffs into the lungs every 6 hours as needed for shortness of breath / dyspnea or wheezing       ascorbic acid 1000 MG Tabs    vitamin C     Take 1,000 mg by mouth daily       atenolol 50 MG tablet    TENORMIN    180 tablet    Take 1 tablet (50 mg) by mouth 2 times daily       atorvastatin 20 MG tablet    LIPITOR    90 tablet    Take 1 tablet (20 mg) by mouth daily       BIOTIN PO      Take 1,000 mcg by mouth       blood glucose monitoring test strip    no brand specified    1 Box    Use to test blood sugar 1 times daily or as directed.       CALCIUM CITRATE + D PO      Take 2 tablets by mouth daily       cycloSPORINE 0.05 % ophthalmic emulsion    RESTASIS     1 drop 2 times daily       diphenhydrAMINE-acetaminophen  MG tablet    TYLENOL PM     Take 1 tablet by mouth At Bedtime Reported on 3/20/2017       emollient cream     60 g    Apply twice daily as needed to face. Mix half and half with 1% hydrocortisone       esomeprazole 40 MG CR capsule    nexIUM     Take 40 mg by mouth 2 times daily Take 30-60 minutes before eating.       estradiol 1 MG tablet    ESTRACE     Take one tab daily       fenofibrate 145 MG tablet     90 tablet    Take 1 tablet (145 mg) by mouth daily       ferrous sulfate 325 (65 FE) MG tablet    IRON     Take 1 tablet by mouth daily (with breakfast).       fexofenadine 180 MG tablet    ALLEGRA    120    Take 180 mg by  mouth daily.       fluticasone 50 MCG/ACT spray    FLONASE    1 Bottle    Spray 2 sprays in nostril daily 2 sprays in each nostril qd       fluticasone 50 MCG/BLIST Aepb    FLOVENT DISKUS     Inhale 1 puff into the lungs every 12 hours       gabapentin 100 MG capsule    NEURONTIN    90 capsule    Take 1 capsule (100 mg) by mouth every evening as needed       hydrochlorothiazide 25 MG tablet    HYDRODIURIL    90 tablet    Take 1 tablet (25 mg) by mouth daily       lisinopril 20 MG tablet    PRINIVIL/ZESTRIL    90 tablet    Take 1 tablet (20 mg) by mouth daily       metFORMIN 500 MG tablet    GLUCOPHAGE     Take 500 mg by mouth daily (with dinner)       metoclopramide 10 MG tablet    REGLAN     Take 1-2 tabs as needed       multivitamin per tablet     100    ONE DAILY       nystatin cream    MYCOSTATIN     Apply topically daily as needed       nystatin-triamcinolone cream    MYCOLOG II     Apply topically daily as needed       ondansetron 4 MG tablet    ZOFRAN     Take 1 tablet by mouth every 6 hours as needed Reported on 3/20/2017       * order for DME     1 Units    Equipment being ordered: Oral appliance for sleep apnea       * order for DME     2 each    Equipment being ordered: Compression stockings - Knee High; 20-30 mmHg compression       ranitidine 300 MG tablet    ZANTAC    30 tablet    Take by mouth At Bedtime Reported on 2/14/2017       SB LOW DOSE ASA EC 81 MG EC tablet   Generic drug:  aspirin      Take 81 mg by mouth daily       sitagliptin 50 MG tablet    JANUVIA     Take 50 mg by mouth daily       sucralfate 1 GM/10ML suspension    CARAFATE     Take 10 mLs by mouth 3 times daily as needed       SYNTHROID 112 MCG tablet   Generic drug:  levothyroxine      Take 112 mcg by mouth daily Take 112 mcg daily except for Friday take 56 mcg.  Brand name Synthroid       VITAMIN D-3 PO      Take 2 tablets by mouth       * Notice:  This list has 2 medication(s) that are the same as other medications prescribed for  you. Read the directions carefully, and ask your doctor or other care provider to review them with you.

## 2017-05-11 NOTE — PATIENT INSTRUCTIONS
1. Coenzyme Q10 100 mg daily can reduce the aching/cramping from statin drugs.  It is an over-the-counter drug.    2. Rosuvastatin is an alternative cholesterol medication.    3.  If you have more nausea and aching, we can do an echo stress test - call us at [phone].

## 2017-05-11 NOTE — LETTER
5/11/2017    Shola Benoit MD  [facility]   [address]  [facility] [address]    RE: Chasity Hodgson       Dear Colleague,    I had the pleasure of seeing Chasity Hodgson in the [facility].    Ms. Chasity Hodgson was seen in followup at [facility] in [place].  She is now 71 years old and has previously been seen for atypical chest discomfort, hypertension and lower extremity edema.      In the past, she has been evaluated for atypical chest discomfort with CT coronary angiography eventually demonstrating an absence of obstructive coronary disease.  That CT angiogram was performed in 2011.  Her last stress nuclear study was in 2014 at which time there was no evidence of ischemia or infarct.  She has normal left ventricular systolic performance.      She does have evidence of plaque in her lower extremity arterial circulation.  There was no evidence of obstructive disease based on Dr. Lazo's evaluation in 2016.  She underwent Holter monitoring before this visit which she requested for re-evaluation of chest discomfort.  The Holter monitor demonstrated  normal sinus rhythm with average heart rate variability and isolated PVCs of which there were only 2 and rare PACs.      Unfortunately, Ms. Hodgson notes that their 13-year-old Cocker Spaniel recently became ill, likely with cancer, and was euthanized.  With those events, she developed chest pressure and nausea leading to some vomiting.  That occurred about 2 weeks ago.  Their dog had slowly declined over a several month interval.  She had 2 episodes that were similar and the discomfort was in her chest, unassociated with shortness of breath or radiation of the discomfort, but associated with the nausea and vomiting.  It did not occur with exertion and it lasted for up to an hour or two.  Since that time, she has not had any recurrence.  She does continue to work and she notes that she was able to walk [place] the  skyway to the clinic without adverse symptoms.  She does remain saddened by their dog's death.      Her blood pressure has previously been elevated and we have worked on that.  Her blood pressure with this visit was in the normal range on the second measurement.  She continues on atenolol, lisinopril and low-dose hydrochlorothiazide.      She does have a history of carotid disease as well and I note that in 2015 Dr. Benoit ordered a carotid ultrasound which demonstrated a 50-69% stenosis in the left internal carotid and a less than 50% stenosis in the right internal carotid artery.  She does continue on low dose aspirin, statin therapy and the ACE inhibition and beta blockade described above.  She has known normal left ventricular systolic performance.  Her last LDL fraction was 49, with a low HDL of 31 and total cholesterol of 142 mg/dL.  Her history is also significant for diabetes mellitus, reactive airway disease, a prior history of polio, a prior colostomy and a prior history of thyroid cancer.      PHYSICAL EXAMINATION:     GENERAL:  This is a woman who appears younger than her stated age.   VITAL SIGNS:  The blood pressure was 132/60 mmHg, heart rate 62 beats per minute and regular and respiratory rate 14-18 per minute.   CHEST:  Clear to auscultation.   CARDIAC:  On cardiac auscultation, there was an S1 and an S2 without extra sounds or a murmur.  The rhythm was regular.     Outpatient Encounter Prescriptions as of 5/11/2017   Medication Sig Dispense Refill     metFORMIN (GLUCOPHAGE) 500 MG tablet Take 500 mg by mouth daily (with dinner)       metoclopramide (REGLAN) 10 MG tablet Take 1-2 tabs as needed       fluticasone (FLONASE) 50 MCG/ACT spray Spray 2 sprays in nostril daily 2 sprays in each nostril qd 1 Bottle 0     gabapentin (NEURONTIN) 100 MG capsule Take 1 capsule (100 mg) by mouth every evening as needed 90 capsule 3     fenofibrate 145 MG tablet Take 1 tablet (145 mg) by mouth daily 90 tablet 2      hydrochlorothiazide (HYDRODIURIL) 25 MG tablet Take 1 tablet (25 mg) by mouth daily 90 tablet 2     lisinopril (PRINIVIL/ZESTRIL) 20 MG tablet Take 1 tablet (20 mg) by mouth daily 90 tablet 2     atenolol (TENORMIN) 50 MG tablet Take 1 tablet (50 mg) by mouth 2 times daily 180 tablet 2     [DISCONTINUED] atorvastatin (LIPITOR) 20 MG tablet Take 1 tablet (20 mg) by mouth daily 90 tablet 2     esomeprazole (NEXIUM) 40 MG capsule Take 40 mg by mouth 2 times daily Take 30-60 minutes before eating.       order for DME Equipment being ordered: Compression stockings - Knee High; 20-30 mmHg compression 2 each 0     fluticasone (FLOVENT DISKUS) 50 MCG/BLIST AEPB Inhale 1 puff into the lungs every 12 hours       order for DME Equipment being ordered: Oral appliance for sleep apnea 1 Units 0     cycloSPORINE (RESTASIS) 0.05 % ophthalmic emulsion 1 drop 2 times daily       sitagliptin (JANUVIA) 50 MG tablet Take 50 mg by mouth daily       Cholecalciferol (VITAMIN D-3 PO) Take 2 tablets by mouth       BIOTIN PO Take 1,000 mcg by mouth       albuterol (PROAIR HFA, PROVENTIL HFA, VENTOLIN HFA) 108 (90 BASE) MCG/ACT inhaler Inhale 2 puffs into the lungs every 6 hours as needed for shortness of breath / dyspnea or wheezing 1 Inhaler 1     blood glucose monitoring (NO BRAND SPECIFIED) test strip Use to test blood sugar 1 times daily or as directed. 1 Box 6     ranitidine (ZANTAC) 300 MG tablet Take by mouth At Bedtime Reported on 2/14/2017 30 tablet      sucralfate (CARAFATE) 1 GM/10ML suspension Take 10 mLs by mouth 3 times daily as needed       estradiol (ESTRACE) 1 MG tablet Take one tab daily       nystatin-triamcinolone (MYCOLOG II) cream Apply topically daily as needed       ascorbic acid (VITAMIN C) 1000 MG TABS Take 1,000 mg by mouth daily       Calcium Citrate-Vitamin D (CALCIUM CITRATE + D PO) Take 2 tablets by mouth daily        ondansetron (ZOFRAN) 4 MG tablet Take 1 tablet by mouth every 6 hours as needed Reported on  3/20/2017       aspirin (SB LOW DOSE ASA EC) 81 MG EC tablet Take 81 mg by mouth daily       emollient (VANICREAM) cream Apply twice daily as needed to face. Mix half and half with 1% hydrocortisone 60 g 1     nystatin (MYCOSTATIN) cream Apply topically daily as needed        levothyroxine (SYNTHROID) 112 MCG tablet Take 112 mcg by mouth daily Take 112 mcg daily except for Friday take 56 mcg.  Brand name Synthroid       diphenhydrAMINE-acetaminophen (TYLENOL PM)  MG tablet Take 1 tablet by mouth At Bedtime Reported on 3/20/2017       ferrous sulfate 325 (65 FE) MG tablet Take 1 tablet by mouth daily (with breakfast).       fexofenadine (ALLEGRA) 180 MG tablet Take 180 mg by mouth daily. 120 0     MULTIVITAMINS OR TABS ONE DAILY 100 3     [DISCONTINUED] Pantoprazole Sodium (PROTONIX PO) Take 40 mg by mouth       [DISCONTINUED] metFORMIN (GLUCOPHAGE) 500 MG tablet Take 500 mg by mouth 2 times daily (with meals) Reported on 2/14/2017       No facility-administered encounter medications on file as of 5/11/2017.       ASSESSMENT:  Ms. Hodgson had atypical chest discomfort associated with a very stressful event.  We discussed it at length.  In the 2 weeks since that time, she has not had any recurrent symptoms.  I have recommended that she contact us if she has any recurrent symptoms.  She is on excellent risk factor intervention for possible coronary artery disease and she does have evidence of peripheral and cerebrovascular disease.  If she has any recurrence, I would repeat either a stress imaging study or a CT coronary angiogram.  If she develops typical angina which has an exertional or supine component, I would recommend coronary angiography.  To follow up on these symptoms, I recommended a return at 3 months.      With regard to risk factor intervention, she is on excellent risk factor intervention.      With regard to her history of hypertension, her blood pressure would appear to be controlled.  I suspect it  was not as well controlled during the events which she described and that likely contributed to her symptoms.      Again, thank you for allowing me to participate in the care of your patient.      Sincerely,    Andreea Ortiz MD     [facility]

## 2017-05-13 NOTE — PROGRESS NOTES
HPI and Plan:   See dictation:  Addendum:  We discussed her concerns of possible aching related to statin therapy. Her symptoms are vague and come and go.  I offered to change her statin to rosuvastatin but she declined.  After discussion, she chose to try Coenzyme Q10 and I recommended 100mg daily.  She will contact the clinic if her aching returns or she desires alternative statin therapy.    Orders Placed This Encounter   Procedures     Follow-Up with Cardiologist     EKG 12-lead complete w/read - Clinics (performed today)       Orders Placed This Encounter   Medications     metFORMIN (GLUCOPHAGE) 500 MG tablet     Sig: Take 500 mg by mouth daily (with dinner)     metoclopramide (REGLAN) 10 MG tablet     Sig: Take 1-2 tabs as needed       Medications Discontinued During This Encounter   Medication Reason     metFORMIN (GLUCOPHAGE) 500 MG tablet Dose adjustment     Pantoprazole Sodium (PROTONIX PO) Alternate therapy         Encounter Diagnoses   Name Primary?     Generalized edema      Atypical chest pain Yes       CURRENT MEDICATIONS:  Current Outpatient Prescriptions   Medication Sig Dispense Refill     metFORMIN (GLUCOPHAGE) 500 MG tablet Take 500 mg by mouth daily (with dinner)       metoclopramide (REGLAN) 10 MG tablet Take 1-2 tabs as needed       fluticasone (FLONASE) 50 MCG/ACT spray Spray 2 sprays in nostril daily 2 sprays in each nostril qd 1 Bottle 0     gabapentin (NEURONTIN) 100 MG capsule Take 1 capsule (100 mg) by mouth every evening as needed 90 capsule 3     fenofibrate 145 MG tablet Take 1 tablet (145 mg) by mouth daily 90 tablet 2     atorvastatin (LIPITOR) 20 MG tablet Take 1 tablet (20 mg) by mouth daily 90 tablet 2     hydrochlorothiazide (HYDRODIURIL) 25 MG tablet Take 1 tablet (25 mg) by mouth daily 90 tablet 2     lisinopril (PRINIVIL/ZESTRIL) 20 MG tablet Take 1 tablet (20 mg) by mouth daily 90 tablet 2     atenolol (TENORMIN) 50 MG tablet Take 1 tablet (50 mg) by mouth 2 times daily 180  tablet 2     esomeprazole (NEXIUM) 40 MG capsule Take 40 mg by mouth 2 times daily Take 30-60 minutes before eating.       order for DME Equipment being ordered: Compression stockings - Knee High; 20-30 mmHg compression 2 each 0     fluticasone (FLOVENT DISKUS) 50 MCG/BLIST AEPB Inhale 1 puff into the lungs every 12 hours       order for DME Equipment being ordered: Oral appliance for sleep apnea 1 Units 0     cycloSPORINE (RESTASIS) 0.05 % ophthalmic emulsion 1 drop 2 times daily       sitagliptin (JANUVIA) 50 MG tablet Take 50 mg by mouth daily       Cholecalciferol (VITAMIN D-3 PO) Take 2 tablets by mouth       BIOTIN PO Take 1,000 mcg by mouth       albuterol (PROAIR HFA, PROVENTIL HFA, VENTOLIN HFA) 108 (90 BASE) MCG/ACT inhaler Inhale 2 puffs into the lungs every 6 hours as needed for shortness of breath / dyspnea or wheezing 1 Inhaler 1     blood glucose monitoring (NO BRAND SPECIFIED) test strip Use to test blood sugar 1 times daily or as directed. 1 Box 6     ranitidine (ZANTAC) 300 MG tablet Take by mouth At Bedtime Reported on 2/14/2017 30 tablet      sucralfate (CARAFATE) 1 GM/10ML suspension Take 10 mLs by mouth 3 times daily as needed       estradiol (ESTRACE) 1 MG tablet Take one tab daily       nystatin-triamcinolone (MYCOLOG II) cream Apply topically daily as needed       ascorbic acid (VITAMIN C) 1000 MG TABS Take 1,000 mg by mouth daily       Calcium Citrate-Vitamin D (CALCIUM CITRATE + D PO) Take 2 tablets by mouth daily        ondansetron (ZOFRAN) 4 MG tablet Take 1 tablet by mouth every 6 hours as needed Reported on 3/20/2017       aspirin (SB LOW DOSE ASA EC) 81 MG EC tablet Take 81 mg by mouth daily       emollient (VANICREAM) cream Apply twice daily as needed to face. Mix half and half with 1% hydrocortisone 60 g 1     nystatin (MYCOSTATIN) cream Apply topically daily as needed        levothyroxine (SYNTHROID) 112 MCG tablet Take 112 mcg by mouth daily Take 112 mcg daily except for Friday  take 56 mcg.  Brand name Synthroid       diphenhydrAMINE-acetaminophen (TYLENOL PM)  MG tablet Take 1 tablet by mouth At Bedtime Reported on 3/20/2017       ferrous sulfate 325 (65 FE) MG tablet Take 1 tablet by mouth daily (with breakfast).       fexofenadine (ALLEGRA) 180 MG tablet Take 180 mg by mouth daily. 120 0     MULTIVITAMINS OR TABS ONE DAILY 100 3       ALLERGIES     Allergies   Allergen Reactions     Nsaids Difficulty breathing     Increased creatinine     Toradol Difficulty breathing     Shortness of breath     Celecoxib Itching and Rash     Codeine Itching     With higher doses     No Clinical Screening - See Comments Itching     Fragrance     Vioxx Other (See Comments)     Heart races     Conjugated Estrogens Rash     Sulfa Drugs Rash       PAST MEDICAL HISTORY:  Past Medical History:   Diagnosis Date     Abdominal adhesions 1984, 96,99    s/p lysis     Allergic rhinitis, cause unspecified      Carotid stenosis      CPAP (continuous positive airway pressure) dependence      Diabetic gastroparesis (H)      Diet-controlled type 2 diabetes mellitus (H)      DVT of axillary vein, acute right (H)      Fibromyalgia      Gastro-oesophageal reflux disease      Hernia of unspecified site of abdominal cavity without mention of obstruction or gangrene     bilateral     HTN (hypertension)      Hypertriglyceridemia      Obstructive sleep apnea      ANNETTE (obstructive sleep apnea)      Papillary carcinoma of thyroid (H)     s/p thyroidectomy - Ruegemer     PE (pulmonary embolism)      Poliomyelitis     poor circulation right leg     Postsurgical hypothyroidism     s/p papillary thryoid cancer - Ruegemer     Pulmonary embolism (H)      Rosacea      S/P carpal tunnel release     bilateral     S/P hardware removal 01/2014    still with lingering foot pain     S/P shoulder surgery     bilateral     Septic joint (H)     right knee     Venous insufficiency      Venous thrombosis 1999    right axillary vein       PAST  SURGICAL HISTORY:  Past Surgical History:   Procedure Laterality Date     AMPUTATE TOE(S)  3/15/2012    Procedure:AMPUTATE TOE(S); Surgeon:SUKHDEEP METZ; Location: OR     APPENDECTOMY  1972     ARTHRODESIS FOOT  3/15/2012    Procedure:ARTHRODESIS FOOT; RIGHT TRIPLE ARTHRODESIS, FIFTH TOE AMPUTATION, LATERAL LIGAMENT RECONSTRUCTION, TENDON TRANSFER AND RELEASE [MINI C-ARM, ARTHREX 4.5 AND 6.7 STAPLES, BIOCOMPOSITE TENODESIS SCREWS]; Surgeon:SUKHDEEP METZ; Location: OR     C FREEING BOWEL ADHESION,ENTEROLYSIS      1986, 1996, 1999     C NONSPECIFIC PROCEDURE      throidectomy     C TOTAL ABDOM HYSTERECTOMY  1980    + BSO     CHOLECYSTECTOMY       COLOSTOMY  2/7/2012    Procedure:COLOSTOMY; CREATION OF SIGMOID COLOSTOMY AND EXTENSIVE  LYSIS OF ADHESIONS; Surgeon:MONTSERRAT BENDER; Location: OR     GI SURGERY      weakened rectal sphincter with artificial stimulator     LAPAROTOMY, LYSIS ADHESIONS, COMBINED  2/7/2012    Procedure:COMBINED LAPAROTOMY, LYSIS ADHESIONS; Surgeon:MONTSERRAT BENDER; Location: OR     RELEASE TENDON FOOT  3/15/2012    Procedure:RELEASE TENDON FOOT; Surgeon:SUKHDEEP METZ; Location: OR     REMOVE HARDWARE FOOT  12/13/2012    Procedure: REMOVE HARDWARE FOOT;  RIGHT FOOT REMOVAL OF HARDWARE;  Surgeon: Sukhdeep Metz MD;  Location:  OR       FAMILY HISTORY:  Family History   Problem Relation Age of Onset     Arthritis Mother      Hypertension Mother      CEREBROVASCULAR DISEASE Mother      Obesity Mother      HEART DISEASE Mother      MI's     Hypertension Father      Respiratory Father      Adult RDS     DIABETES Father      adult     Arthritis Sister      CANCER Sister      Arthritis Sister      Hypertension Sister      CANCER Sister      colon polup     HEART DISEASE Brother      MI at 54     Hypertension Sister      Lipids Brother      Hypertension Brother      Lipids Sister      Obesity Sister      Obesity Maternal Grandmother      Skin  "Cancer Maternal Grandmother      skin cancer unknown     CANCER Maternal Grandmother      unknown skin cancer on face       SOCIAL HISTORY:  Social History     Social History     Marital status:      Spouse name: N/A     Number of children: N/A     Years of education: N/A     Social History Main Topics     Smoking status: Never Smoker     Smokeless tobacco: Never Used     Alcohol use No      Comment: very rare     Drug use: No     Sexual activity: Not Currently     Partners: Male     Birth control/ protection: Female Surgical     Other Topics Concern     Caffeine Concern Yes     occ     Sleep Concern Yes     Stress Concern Yes     Special Diet Yes     low carb, low sugar      Exercise No     occ. 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[employer]        Review of Systems:  Skin:  Negative for bruising;rash     Eyes:  Positive for glasses    ENT:  Negative      Respiratory:  Positive for cough occ    Cardiovascular:    Positive for;chest pain;dizziness;palpitations;edema;fatigue 3 episodes of cp/palpitations   Gastroenterology: Positive for heartburn;nausea;vomiting;diarrhea    Genitourinary:  Negative      Musculoskeletal:  Positive for arthritis knees & shoulders   Neurologic:  Negative      Psychiatric:  Positive for sleep disturbances improving   Heme/Lymph/Imm:  Negative      Endocrine:  Positive for diabetes      Physical Exam:  Vitals: /60  Pulse 62  Ht 1.562 m (5' 1.5\")  Wt 94.9 kg (209 lb 3.2 oz)  BMI 38.89 kg/m2    Constitutional:  cooperative, alert and oriented, well developed, well nourished, in no acute distress        Skin:  warm and dry to the touch        Head:  normocephalic        Eyes:  sclera white        ENT:           Neck:           Chest:  normal breath sounds, clear to auscultation, normal A-P diameter, normal symmetry, normal respiratory excursion, no use of accessory muscles      "     Cardiac: regular rhythm, normal S1/S2, no S3 or S4, apical impulse not displaced, no murmurs, gallops or rubs                  Abdomen:           Vascular:                                          Extremities and Back:  no deformities, clubbing, cyanosis, erythema observed   trace;bilateral LE edema          Neurological:  affect appropriate, oriented to time, person and place;no gross motor deficits              CC  Andreea Ortiz MD   [facility]  [id number]  [place] [address]

## 2017-05-15 NOTE — PROGRESS NOTES
HISTORY OF PRESENT ILLNESS:  Ms. Chasity Hodgson was seen in followup at [facility] in [place].  She is now 71 years old and has previously been seen for atypical chest discomfort, hypertension and lower extremity edema.      In the past, she has been evaluated for atypical chest discomfort with CT coronary angiography eventually demonstrating an absence of obstructive coronary disease.  That CT angiogram was performed in 2011.  Her last stress nuclear study was in 2014 at which time there was no evidence of ischemia or infarct.  She has normal left ventricular systolic performance.      She does have evidence of plaque in her lower extremity arterial circulation.  There was no evidence of obstructive disease based on Dr. Lazo's evaluation in 2016.  She underwent Holter monitoring before this visit which she requested for re-evaluation of chest discomfort.  The Holter monitor demonstrated  normal sinus rhythm with average heart rate variability and isolated PVCs of which there were only 2 and rare PACs.      Unfortunately, Ms. Hodgson notes that their 13-year-old Cocker Spaniel recently became ill, likely with cancer, and was euthanized.  With those events, she developed chest pressure and nausea leading to some vomiting.  That occurred about 2 weeks ago.  Their dog had slowly declined over a several month interval.  She had 2 episodes that were similar and the discomfort was in her chest, unassociated with shortness of breath or radiation of the discomfort, but associated with the nausea and vomiting.  It did not occur with exertion and it lasted for up to an hour or two.  Since that time, she has not had any recurrence.  She does continue to work and she notes that she was able to walk [place] to the clinic without adverse symptoms.  She does remain saddened by their dog's death.      Her blood pressure has previously been elevated and we have worked on that.  Her blood pressure with this visit was in the  normal range on the second measurement.  She continues on atenolol, lisinopril and low-dose hydrochlorothiazide.      She does have a history of carotid disease as well and I note that in 2015 Dr. Benoit ordered a carotid ultrasound which demonstrated a 50-69% stenosis in the left internal carotid and a less than 50% stenosis in the right internal carotid artery.  She does continue on low dose aspirin, statin therapy and the ACE inhibition and beta blockade described above.  She has known normal left ventricular systolic performance.  Her last LDL fraction was 49, with a low HDL of 31 and total cholesterol of 142 mg/dL.  Her history is also significant for diabetes mellitus, reactive airway disease, a prior history of polio, a prior colostomy and a prior history of thyroid cancer.      PHYSICAL EXAMINATION:     GENERAL:  This is a woman who appears younger than her stated age.   VITAL SIGNS:  The blood pressure was 132/60 mmHg, heart rate 62 beats per minute and regular and respiratory rate 14-18 per minute.   CHEST:  Clear to auscultation.   CARDIAC:  On cardiac auscultation, there was an S1 and an S2 without extra sounds or a murmur.  The rhythm was regular.      ASSESSMENT:  Ms. Hodgson had atypical chest discomfort associated with a very stressful event.  We discussed it at length.  In the 2 weeks since that time, she has not had any recurrent symptoms.  I have recommended that she contact us if she has any recurrent symptoms.  She is on excellent risk factor intervention for possible coronary artery disease and she does have evidence of peripheral and cerebrovascular disease.  If she has any recurrence, I would repeat either a stress imaging study or a CT coronary angiogram.  If she develops typical angina which has an exertional or supine component, I would recommend coronary angiography.  To follow up on these symptoms, I recommended a return at 3 months.      With regard to risk factor intervention, she is on  excellent risk factor intervention.      With regard to her history of hypertension, her blood pressure would appear to be controlled.  I suspect it was not as well controlled during the events which she described and that likely contributed to her symptoms.         EDWIN BEARD MD, [facility]             D: 2017 14:31   T: 05/15/2017 12:42   MT: CARMELA      Name:     SUZANNE BROOKE   MRN:      [id number]        Account:      [id number]   :      1945           Service Date: 2017      Document: [id number]

## 2017-06-14 ENCOUNTER — RADIANT APPOINTMENT (OUTPATIENT)
Dept: GENERAL RADIOLOGY | Facility: CLINIC | Age: 72
End: 2017-06-14
Attending: INTERNAL MEDICINE
Payer: MEDICARE

## 2017-06-14 ENCOUNTER — OFFICE VISIT (OUTPATIENT)
Dept: FAMILY MEDICINE | Facility: CLINIC | Age: 72
End: 2017-06-14
Payer: MEDICARE

## 2017-06-14 VITALS — BODY MASS INDEX: 38.46 KG/M2 | HEIGHT: 62 IN | OXYGEN SATURATION: 100 % | TEMPERATURE: 98.1 F | WEIGHT: 209 LBS

## 2017-06-14 DIAGNOSIS — M53.3 COCCYDYNIA: ICD-10-CM

## 2017-06-14 DIAGNOSIS — Z12.11 SPECIAL SCREENING FOR MALIGNANT NEOPLASMS, COLON: ICD-10-CM

## 2017-06-14 DIAGNOSIS — E78.5 HYPERLIPIDEMIA LDL GOAL <100: ICD-10-CM

## 2017-06-14 DIAGNOSIS — M54.50 ACUTE LEFT-SIDED LOW BACK PAIN WITHOUT SCIATICA: ICD-10-CM

## 2017-06-14 DIAGNOSIS — M53.3 COCCYDYNIA: Primary | ICD-10-CM

## 2017-06-14 PROCEDURE — 99213 OFFICE O/P EST LOW 20 MIN: CPT | Performed by: INTERNAL MEDICINE

## 2017-06-14 PROCEDURE — 72220 X-RAY EXAM SACRUM TAILBONE: CPT

## 2017-06-14 RX ORDER — ROSUVASTATIN CALCIUM 10 MG/1
10 TABLET, COATED ORAL DAILY
Qty: 30 TABLET | Refills: 11 | Status: SHIPPED | OUTPATIENT
Start: 2017-06-14 | End: 2017-11-08

## 2017-06-14 NOTE — PROGRESS NOTES
"[facility]  CLINIC PROGRESS NOTE    Subjective:  Lower back pain   Chasity Hodgson notes that she has developed pain in the rectum area that hurts when she sits on it.  She recalls that on April 28 she had gone shopping and she fell on her buttocks when tripping over a curb.  Then about 1-2 weeks later she started noticing pain in the rectum.  She now has pain in the rectal area and cream has not been effective.  She does get a rash in the groin in the area that does respond to Nystatin.        Past medical history, medications, allergies, social history, family history reviewed and updated in [facility] as of 6/14/2017 .    ROS  CONSTITUTIONAL: no fatigue, no unexpected change in weight  SKIN: no worrisome rashes, no worrisome moles, no worrisome lesions  EYES: no acute vision problems or changes  ENT: no ear problems, no mouth problems, no throat problems  RESP: no significant cough, no shortness of breath  CV: no chest pain, no palpitations, no new or worsening peripheral edema  GI: no nausea, no vomiting, no constipation, no diarrhea  : no frequency, no dysuria, no hematuria  MS: as above   PSYCHIATRIC: no changes in mood or affect      Objective:  Vitals  Temp 98.1  F (36.7  C) (Oral)  Ht 5' 1.5\" (1.562 m)  Wt 209 lb (94.8 kg)  SpO2 100%  Breastfeeding? No  BMI 38.85 kg/m2  GEN: Alert Oriented x3 NAD  HEENT: Atraumatic, normocephalic, neck supple, no thyromegaly, negative cervical adenopathy  TM: TM bilaterally pearly and grey with normal light reflex  CV: RRR no murmurs or rubs  PULM: CTA no wheezes or crackles  ABD: Soft, nontender nondistended  SKIN: No visible skin lesion or ulcerations  EXT: 2+ dorsal pedis pulses, no edema bilateral lower extremities  RECTUM: normal skin - no visible bruising  NEURO: Gait and station normal   PSYCH: Mood good, affect mood congruent    No results found for this or any previous visit (from the past 24 hour(s)).    Assessment/Plan:  Patient Instructions   (M53.3) " Coccydynia  (primary encounter diagnosis)  Comment: We will check X-ray today to evaluate for fracture and will use acetaminophen for pain control.  I would expect pain to dissipate over the next few weeks.    Plan: XR Sacrum and Coccyx 2 Views, order for DME            (E78.5) Hyperlipidemia LDL goal <100  Comment: We will adjust statin to start rosuvastatin versus atorvastatin   Plan: rosuvastatin (CRESTOR) 10 MG tablet            (M54.5) Acute left-sided low back pain without sciatica  Comment: Referral to physical therapy given   Plan: [facility]           Screen Colon cancer  Comment; may have need for a colonoscopy but this has been discussed with Dr. Pantoja in gastroenterology and recommended against this.    Follow up in physical therapy     15 minutes spent with patient.  Over 50% of time counseling      Disclaimer: This note consists of symbols derived from keyboarding, dictation and/or voice recognition software. As a result, there may be errors in the script that have gone undetected. Please consider this when interpreting information found in this chart.    Shola Benoit MD  [phone]

## 2017-06-14 NOTE — PATIENT INSTRUCTIONS
(M53.3) Coccydynia  (primary encounter diagnosis)  Comment: We will check X-ray today to evaluate for fracture and will use acetaminophen for pain control.  I would expect pain to dissipate over the next few weeks.    Plan: XR Sacrum and Coccyx 2 Views, order for DME            (E78.5) Hyperlipidemia LDL goal <100  Comment: We will adjust statin to start rosuvastatin versus atorvastatin   Plan: rosuvastatin (CRESTOR) 10 MG tablet            (M54.5) Acute left-sided low back pain without sciatica  Comment: Referral to physical therapy given   Plan: [facility]           Screen Colon cancer  Comment; may have need for a colonoscopy but this has been discussed with Dr. Pantoja in gastroenterology and recommended against this.

## 2017-06-14 NOTE — MR AVS SNAPSHOT
After Visit Summary   6/14/2017    Chasity Hodgson    MRN: [id number]           Patient Information     Date Of Birth          1945        Visit Information        Provider Department      6/14/2017 12:30 PM Shola Benoit MD [facility]        Today's Diagnoses     Coccydynia    -  1    Hyperlipidemia LDL goal <100        Acute left-sided low back pain without sciatica          Care Instructions    (M53.3) Coccydynia  (primary encounter diagnosis)  Comment: We will check X-ray today to evaluate for fracture and will use acetaminophen for pain control.  I would expect pain to dissipate over the next few weeks.    Plan: XR Sacrum and Coccyx 2 Views, order for DME            (E78.5) Hyperlipidemia LDL goal <100  Comment: We will adjust statin to start rosuvastatin versus atorvastatin   Plan: rosuvastatin (CRESTOR) 10 MG tablet            (M54.5) Acute left-sided low back pain without sciatica  Comment: Referral to physical therapy given   Plan: [facility]                     Follow-ups after your visit        Additional Services     [facility] REFERRAL       **This order will print in the [facility] Scheduling Office**    Physical Therapy, Hand Therapy and Chiropractic Care are available through:    *[facility]  *[facility]  *[facility]    Call one number to schedule at any of the above locations: [phone].    Your provider has referred you to: Physical Therapy at [facility] or [facility]    Indication/Reason for Referral: Low Back Pain  Onset of Illness: acute  Therapy Orders: Evaluate and Treat  Special Programs: None  Special Request: None    Erik Valencia      Additional Comments for the Therapist or Chiropractor: none    Please be aware that coverage of these services is subject to the terms and limitations of your health insurance plan.  Call member services at your health plan with  "any benefit or coverage questions.      Please bring the following to your appointment:    *Your personal calendar for scheduling future appointments  *Comfortable clothing                  Future tests that were ordered for you today     Open Future Orders        Priority Expected Expires Ordered    XR Sacrum and Coccyx 2 Views Routine 6/14/2017 6/14/2018 6/14/2017            Who to contact     If you have questions or need follow up information about today's clinic visit or your schedule please contact [facility] directly at [phone].  Normal or non-critical lab and imaging results will be communicated to you by [employer], letter or phone within 4 business days after the clinic has received the results. If you do not hear from us within 7 days, please contact the clinic through [employer] or phone. If you have a critical or abnormal lab result, we will notify you by phone as soon as possible.  Submit refill requests through [employer] or call your pharmacy and they will forward the refill request to us. Please allow 3 business days for your refill to be completed.          Additional Information About Your Visit        [employer] Information     [employer] gives you secure access to your electronic health record. If you see a primary care provider, you can also send messages to your care team and make appointments. If you have questions, please call your primary care clinic.  If you do not have a primary care provider, please call [phone] and they will assist you.        Care EveryWhere ID     This is your Care EveryWhere ID. This could be used by other organizations to access your [facility] medical records  [id number]        Your Vitals Were     Temperature Height Pulse Oximetry Breastfeeding? BMI (Body Mass Index)       98.1  F (36.7  C) (Oral) 5' 1.5\" (1.562 m) 100% No 38.85 kg/m2        Blood Pressure from Last 3 Encounters:   05/11/17 132/60   03/25/17 147/78   03/20/17 122/84    Weight from Last 3 " Encounters:   06/14/17 209 lb (94.8 kg)   05/11/17 209 lb 3.2 oz (94.9 kg)   03/20/17 205 lb (93 kg)              We Performed the Following     [facility] REFERR[facility]          Today's Medication Changes          These changes are accurate as of: 6/14/17  1:12 PM.  If you have any questions, ask your nurse or doctor.               Start taking these medicines.        Dose/Directions    rosuvastatin 10 MG tablet   Commonly known as:  CRESTOR   Used for:  Hyperlipidemia LDL goal <100   Started by:  Shola Benoit MD        Dose:  10 mg   Take 1 tablet (10 mg) by mouth daily   Quantity:  30 tablet   Refills:  11         These medicines have changed or have updated prescriptions.        Dose/Directions    * order for DME   This may have changed:  Another medication with the same name was added. Make sure you understand how and when to take each.   Used for:  ANNETTE (obstructive sleep apnea)   Changed by:  Florencia Ratliff MD        Equipment being ordered: Oral appliance for sleep apnea   Quantity:  1 Units   Refills:  0       * order for DME   This may have changed:  Another medication with the same name was added. Make sure you understand how and when to take each.   Used for:  Insufficiency, arterial, peripheral (H), Edema   Changed by:  Shola Benoit MD        Equipment being ordered: Compression stockings - Knee High; 20-30 mmHg compression   Quantity:  2 each   Refills:  0       * order for DME   This may have changed:  You were already taking a medication with the same name, and this prescription was added. Make sure you understand how and when to take each.   Used for:  Coccydynia   Changed by:  Shola Benoit MD        Donut Pillow   Quantity:  1 each   Refills:  0       * Notice:  This list has 3 medication(s) that are the same as other medications prescribed for you. Read the directions carefully, and ask your doctor or other care provider to review them  with you.      Stop taking these medicines if you haven't already. Please contact your care team if you have questions.     atorvastatin 20 MG tablet   Commonly known as:  LIPITOR   Stopped by:  Shola Benoit MD                Where to get your medicines      These medications were sent to [facility] [place], [place] - [address]  [address] [facility] [address]     Phone:  [phone]     rosuvastatin 10 MG tablet         Some of these will need a paper prescription and others can be bought over the counter.  Ask your nurse if you have questions.     Bring a paper prescription for each of these medications     order for DME                Primary Care Provider Office Phone # Fax #    Shola Benoit [phone]       [facility] [address]  [facility] [address]        Thank you!     Thank you for choosing [facility]  for your care. Our goal is always to provide you with excellent care. Hearing back from our patients is one way we can continue to improve our services. Please take a few minutes to complete the written survey that you may receive in the mail after your visit with us. Thank you!             Your Updated Medication List - Protect others around you: Learn how to safely use, store and throw away your medicines at www.disposemymeds.org.          This list is accurate as of: 6/14/17  1:12 PM.  Always use your most recent med list.                   Brand Name Dispense Instructions for use    albuterol 108 (90 BASE) MCG/ACT Inhaler    PROAIR HFA/PROVENTIL HFA/VENTOLIN HFA    1 Inhaler    Inhale 2 puffs into the lungs every 6 hours as needed for shortness of breath / dyspnea or wheezing       ascorbic acid 1000 MG Tabs    vitamin C     Take 1,000 mg by mouth daily       atenolol 50 MG tablet    TENORMIN    180 tablet    Take 1 tablet (50 mg) by mouth 2 times daily       BIOTIN PO      Take 1,000 mcg by mouth        blood glucose monitoring test strip    no brand specified    1 Box    Use to test blood sugar 1 times daily or as directed.       CALCIUM CITRATE + D PO      Take 2 tablets by mouth daily       cycloSPORINE 0.05 % ophthalmic emulsion    RESTASIS     1 drop 2 times daily       diphenhydrAMINE-acetaminophen  MG tablet    TYLENOL PM     Take 1 tablet by mouth At Bedtime Reported on 3/20/2017       emollient cream     60 g    Apply twice daily as needed to face. Mix half and half with 1% hydrocortisone       esomeprazole 40 MG CR capsule    nexIUM     Take 40 mg by mouth 2 times daily Take 30-60 minutes before eating.       estradiol 1 MG tablet    ESTRACE     Take one tab daily       fenofibrate 145 MG tablet     90 tablet    Take 1 tablet (145 mg) by mouth daily       ferrous sulfate 325 (65 FE) MG tablet    IRON     Take 1 tablet by mouth daily (with breakfast).       fexofenadine 180 MG tablet    ALLEGRA    120    Take 180 mg by mouth daily.       fluticasone 50 MCG/ACT spray    FLONASE    1 Bottle    Spray 2 sprays in nostril daily 2 sprays in each nostril qd       fluticasone 50 MCG/BLIST Aepb    FLOVENT DISKUS     Inhale 1 puff into the lungs every 12 hours       gabapentin 100 MG capsule    NEURONTIN    90 capsule    Take 1 capsule (100 mg) by mouth every evening as needed       hydrochlorothiazide 25 MG tablet    HYDRODIURIL    90 tablet    Take 1 tablet (25 mg) by mouth daily       lisinopril 20 MG tablet    PRINIVIL/ZESTRIL    90 tablet    Take 1 tablet (20 mg) by mouth daily       metFORMIN 500 MG tablet    GLUCOPHAGE     Take 500 mg by mouth daily (with dinner)       metoclopramide 10 MG tablet    REGLAN     Take 1-2 tabs as needed       multivitamin per tablet     100    ONE DAILY       nystatin cream    MYCOSTATIN     Apply topically daily as needed       nystatin-triamcinolone cream    MYCOLOG II     Apply topically daily as needed       ondansetron 4 MG tablet    ZOFRAN     Take 1 tablet by  mouth every 6 hours as needed Reported on 3/20/2017       * order for DME     1 Units    Equipment being ordered: Oral appliance for sleep apnea       * order for DME     2 each    Equipment being ordered: Compression stockings - Knee High; 20-30 mmHg compression       * order for DME     1 each    Donut Pillow       ranitidine 300 MG tablet    ZANTAC    30 tablet    Take by mouth At Bedtime Reported on 2/14/2017       rosuvastatin 10 MG tablet    CRESTOR    30 tablet    Take 1 tablet (10 mg) by mouth daily       SB LOW DOSE ASA EC 81 MG EC tablet   Generic drug:  aspirin      Take 81 mg by mouth daily       sitagliptin 50 MG tablet    JANUVIA     Take 50 mg by mouth daily       sucralfate 1 GM/10ML suspension    CARAFATE     Take 10 mLs by mouth 3 times daily as needed       SYNTHROID 112 MCG tablet   Generic drug:  levothyroxine      Take 112 mcg by mouth daily Take 112 mcg daily except for Friday take 56 mcg.  Brand name Synthroid       VITAMIN D-3 PO      Take 2 tablets by mouth       * Notice:  This list has 3 medication(s) that are the same as other medications prescribed for you. Read the directions carefully, and ask your doctor or other care provider to review them with you.

## 2017-06-16 NOTE — PROGRESS NOTES
Gerard Gaspar,    I have had the opportunity to review your recent results and an interpretation is as follows:  Your X-ray did not show any concerning findings     Sincerely,  Shola Benoit MD

## 2017-06-23 ENCOUNTER — THERAPY VISIT (OUTPATIENT)
Dept: PHYSICAL THERAPY | Facility: CLINIC | Age: 72
End: 2017-06-23
Payer: MEDICARE

## 2017-06-23 DIAGNOSIS — M54.50 LUMBAGO: Primary | ICD-10-CM

## 2017-06-23 DIAGNOSIS — M54.41 BILATERAL LOW BACK PAIN WITH RIGHT-SIDED SCIATICA: ICD-10-CM

## 2017-06-23 PROCEDURE — 97161 PT EVAL LOW COMPLEX 20 MIN: CPT | Mod: GP | Performed by: PHYSICAL THERAPIST

## 2017-06-23 PROCEDURE — G8978 MOBILITY CURRENT STATUS: HCPCS | Mod: GP | Performed by: PHYSICAL THERAPIST

## 2017-06-23 PROCEDURE — G8979 MOBILITY GOAL STATUS: HCPCS | Mod: GP | Performed by: PHYSICAL THERAPIST

## 2017-06-23 PROCEDURE — 97110 THERAPEUTIC EXERCISES: CPT | Mod: GP | Performed by: PHYSICAL THERAPIST

## 2017-06-23 NOTE — LETTER
"DEPARTMENT OF HEALTH AND HUMAN SERVICES  CENTERS FOR MEDICARE & MEDICAID SERVICES    PLAN/UPDATED PLAN OF PROGRESS FOR OUTPATIENT REHABILITATION    PATIENT NAME:  Chasity Hodgson   : 1945  PROVID[facility] NUMBER:    [id number]  [id number]   PROVIDER NAME: [facility]  MEDICAL RECORD NUMBER: [id number]   START OF CARE DATE:  SOC Date: 17   TYPE:  PT  PRIMARY/TREATMENT DIAGNOSIS: (Pertinent Medical Diagnosis)  Lumbago  Bilateral low back pain with right-sided sciatica  VISITS FROM START OF CARE:  Rxs Used: 1     Subjective:  Physical Therapy Initial Evaluation    Therapist Impression:   Chasity Hodgson is a 71-year-old female patient presenting to Physical Therapy with: low back pain. These impairments limit her ability to sit to stand without pain.  Skilled PT services are necessary in order to reduce impairments and improve independent function.    Precautions/Restrictions/MD instructions: Evaluate and treat       Subjective:    DOI/onset: 17  History of current symptoms: Back pain occurred after falling onto her buttocks stepping off of a curb. The most pain came on 2 weeks after the fall.  Hurts when sitting on tailbone as she fell in (xrays negative).  Poliomyelitis in right leg - which she wears an AFO on (Right leg is about an inch shorter than left and much weaker).  This may make her low back pain worse but she has an appointment to adjust this brace.  Pain comes and goes.    -Also has a colostomy bag  Primary pain location: Pain into both buttock  Quality/radiation: posterior right thigh, not past knee; sometimes into front (\"belt line\")  Exacerbated by: Difficult to say - \"maybe going from sitting to standing position; can't walk more than half a mile.\"  Bending over Relieved by: Tylenol  Pain: She denies having painful cough/sneeze, saddle anaesthesia, severe night pain, peripheral motor deficit, recent bowel/bladder change, recent vision change, ringing in the ears or pain with " swallowing. Pain is rated 7/10 Currently  Previous Treatment: none    Imaging: X-rays: negative at pelvus, sacrum     Occupation:  and [employer]  Job duties/Hobbies: prolonged sitting, repetitive work     PMH: DVT, venous insufficiency, shoulder surgery, PE, poliomyelitis, Sleep apnea, HTN, DMII; osteoarthritis, cancer, fibromyalgia, anemia, change in bowel/bladder (since before pain). Thyroid cancer (removed).  PATIENT NAME:  Chasity Hodgson   : 1945      Medications: refer to medical chart; thyroid, sleep  Sleeping: Doesn't wake up at night because of her pain    Patient's goals: to decrease pain  General health as reported by patient: good.     Previous functional status:  fully functional prior to pain onset/injury.     Objective:  LUMBAR:  Gait: slight limp as she has AFO on right side (polio)  Functional:  - Squat: decreased balance, slight pain    AROM: (Major, Moderate, Minimal or Nil loss)  Movement Loss Cristobal Mod Min Nil Pain   Flexion   x     Extension   x  x   Rotation Right        Rotation Left        Side bend Right  x   x   Side bend Left   x       Repeated movement testing: no preference    Hip PROM: Slightly decreased IR and ER on right    Neurological:  Motor Deficit:  Myotomes L R   L1-2 (hip flexion) 4/5 5/5   L3 (knee extension) 5 4/5 polio   L4 (ankle DF) 5 0   L5 (g. toe ext) 5 0   S1 (ankle PF or knee flex) 5      SLR: compensates for decreased rectus femoris strength but turning foot out and using adductors    Other strength:   -Glute: R 3+/5, L3+/5,    -Sidelying Hip abduction: R 3/5, L3/5    Sensory Deficit, Reflexes: Seated patellar reflexes: 2+ on left, unable to elicit on right  Light touch sensation: wnl        PATIENT NAME:  Chasity Hodgson   : 1945      Dural Signs:   L R   Slump     SLR (+) at 60 degrees unable to DF foot (brace on)     Palpation: very TTP at tony QL and tony piriformis    Other Tests:   -SI: Pain over palpated area with ASIS gapping and  thigh thrust but no pain over SI joints    Pelvic tilting control: fair    Assessment/Plan:    Patient is a 71-year-old female with lumbar complaints.    Patient has the following significant findings with corresponding treatment plan.                Diagnosis 1:  Low back pain with radiculopathy  Pain -  hot/cold therapy, US, manual therapy, self management, education, directional preference exercise and home program  Decreased ROM/flexibility - manual therapy, therapeutic exercise and home program  Decreased joint mobility - manual therapy, therapeutic exercise and home program  Decreased strength - therapeutic exercise, therapeutic activities and home program  Decreased function - therapeutic activities and home program  Impaired posture - neuro re-education and home program    Therapy Evaluation Codes:   1) History comprised of:   Personal factors that impact the plan of care:     Past/current experiences and Profession.    Comorbidity factors that impact the plan of care are:     Cancer, Diabetes, Fibromyalgia, High blood pressure, Osteoarthritis and Overweight.   Poliomyelitis on right leg   Medications impacting care: High blood pressure, Sleep and thyroid.   2) Examination of Body Systems comprised of:   Body structures and functions that impact the plan of care:  Hip and Lumbar spine.   Activity limitations that impact the plan of care are:     Bending, Sitting, Walking and Working.  3) Clinical presentation characteristics are: Stable/Uncomplicated.  4) Decision-Making:  Low complexity using standardized patient assessment instrument and/or measureable assessment of functional outcome.  Cumulative Therapy Evaluation is: Low complexity.    Previous and current functional limitations: (See Goal Flow Sheet for this information)    Short term and Long term goals: (See Goal Flow Sheet for this information)   Communication ability: Patient appears to be able to clearly communicate and understand verbal and  "written communication and follow directions correctly.  PATIENT NAME:  Chasity Hodgson   : 1945      Treatment Explanation - The following has been discussed with the patient: RX ordered/plan of care  Anticipated outcomes  Possible risks and side effects  This patient would benefit from PT intervention to resume normal activities.   Rehab potential is good.  Frequency:  1 X week, once daily  Duration:  for 6 weeks  Discharge Plan:  Achieve all LTG.  Independent in home treatment program.  Reach maximal therapeutic benefit.      Caregiver Signature/Credentials _____________________________ Date __________            Treating Provider: Jon Schoenecker, PT       I have reviewed and certified the need for these services and plan of treatment while under my care.    PHYSICIAN'S SIGNATURE:   _____________________________________  Date___________            Shola Benoit    Certification period:  Beginning of Cert date period: 17 to  End of Cert period date: 17     Functional Level Progress Report: Please see attached \"Goal Flow sheet for Functional level.\"    ____X____ Continue Services or       ________ DC Services                Service dates: From  SOC Date: 17 date to present                  "

## 2017-06-23 NOTE — MR AVS SNAPSHOT
After Visit Summary   6/23/2017    Chasity Hodgson    MRN: [id number]           Patient Information     Date Of Birth          1945        Visit Information        Provider Department      6/23/2017 11:00 AM Schoenecker, Jon, PT [facility]        Today's Diagnoses     Lumbago    -  1    Bilateral low back pain with right-sided sciatica           Follow-ups after your visit        Your next 10 appointments already scheduled     Jun 29, 2017 10:40 AM CDT   [facility] with Jon Schoenecker, PT   [facility] ([facility] [place]  )    [id number] [place] [address]   [phone]            Jul 07, 2017 11:00 AM CDT   [facility] with Jon Schoenecker, PT   [facility] ([facility] [place]  )    [address] [place] [address]   [phone]            Jul 14, 2017 11:00 AM CDT   [facility] with Jon Schoenecker, PT   [facility] ([facility] [place]  )    [address] [place] [address]   [phone]            Jul 20, 2017 12:30 PM CDT   [facility] with Jon Schoenecker, PT   [facility] ([facility] [place]  )    [id number] [place] [address]   [phone]            Jul 28, 2017 10:20 AM CDT   [facility] with Jon Schoenecker, PT   [facility] ([facility] [place]  )    [id number] [place] [address]   [phone]              Who to contact     If you have questions or need follow up information about today's clinic visit or your schedule please contact [facility] directly at [phone].  Normal or non-critical lab and imaging results will be communicated to you by MyChart, letter or phone within 4 business days after the clinic has received the results. If you do not hear from us within 7 days, please contact the clinic through MyChart or phone. If you have a critical or abnormal lab result, we will notify you by phone as soon as possible.  Submit refill  requests through [employer] or call your pharmacy and they will forward the refill request to us. Please allow 3 business days for your refill to be completed.          Additional Information About Your Visit        [employer] Information     [employer] gives you secure access to your electronic health record. If you see a primary care provider, you can also send messages to your care team and make appointments. If you have questions, please call your primary care clinic.  If you do not have a primary care provider, please call [phone] and they will assist you.        Care EveryWhere ID     This is your Care EveryWhere ID. This could be used by other organizations to access your [facility] medical records  [id number]         Blood Pressure from Last 3 Encounters:   05/11/17 132/60   03/25/17 147/78   03/20/17 122/84    Weight from Last 3 Encounters:   06/14/17 94.8 kg (209 lb)   05/11/17 94.9 kg (209 lb 3.2 oz)   03/20/17 93 kg (205 lb)              We Performed the Following     HC PT EVAL, LOW COMPLEXITY     [facility] CERT REPORT     [facility] INITIAL EVAL REPORT     THERAPEUTIC EXERCISES        Primary Care Provider Office Phone # Fax #    Shola Benoit [phone]       [facility] [address] [facility] [address]  [facility] [address]        Equal Access to Services     CORINNE ROBB AH: Hadii aad ku hadasho Soomaali, waaxda luqadaha, qaybta kaalmada adeegyada, waxay idiin hayaan jory baldwin . So [facility] [phone].    ATENCIÓN: Si habla español, tiene a modi disposición servicios gratuitos de asistencia lingüística. Llame al [phone].    We comply with applicable federal civil rights laws and [place] laws. We do not discriminate on the basis of race, color, national origin, age, disability sex, sexual orientation or gender identity.            Thank you!     Thank you for choosing [facility]  for your care. Our goal is always to provide you with excellent care. Hearing back from  our patients is one way we can continue to improve our services. Please take a few minutes to complete the written survey that you may receive in the mail after your visit with us. Thank you!             Your Updated Medication List - Protect others around you: Learn how to safely use, store and throw away your medicines at www.disposemymeds.org.          This list is accurate as of: 6/23/17  5:59 PM.  Always use your most recent med list.                   Brand Name Dispense Instructions for use Diagnosis    albuterol 108 (90 BASE) MCG/ACT Inhaler    PROAIR HFA/PROVENTIL HFA/VENTOLIN HFA    1 Inhaler    Inhale 2 puffs into the lungs every 6 hours as needed for shortness of breath / dyspnea or wheezing    Cough       ascorbic acid 1000 MG Tabs    vitamin C     Take 1,000 mg by mouth daily        atenolol 50 MG tablet    TENORMIN    180 tablet    Take 1 tablet (50 mg) by mouth 2 times daily    Essential hypertension       BIOTIN PO      Take 1,000 mcg by mouth        blood glucose monitoring test strip    no brand specified    1 Box    Use to test blood sugar 1 times daily or as directed.    Type 2 diabetes mellitus with other specified complication (H)       CALCIUM CITRATE + D PO      Take 2 tablets by mouth daily        cycloSPORINE 0.05 % ophthalmic emulsion    RESTASIS     1 drop 2 times daily        diphenhydrAMINE-acetaminophen  MG tablet    TYLENOL PM     Take 1 tablet by mouth At Bedtime Reported on 3/20/2017        emollient cream     60 g    Apply twice daily as needed to face. Mix half and half with 1% hydrocortisone    Dermatitis       esomeprazole 40 MG CR capsule    nexIUM     Take 40 mg by mouth 2 times daily Take 30-60 minutes before eating.        estradiol 1 MG tablet    ESTRACE     Take one tab daily        fenofibrate 145 MG tablet     90 tablet    Take 1 tablet (145 mg) by mouth daily    CARDIOVASCULAR SCREENING; LDL GOAL LESS THAN 130       ferrous sulfate 325 (65 FE) MG tablet    IRON      Take 1 tablet by mouth daily (with breakfast).        fexofenadine 180 MG tablet    ALLEGRA    120    Take 180 mg by mouth daily.        fluticasone 50 MCG/ACT spray    FLONASE    1 Bottle    Spray 2 sprays in nostril daily 2 sprays in each nostril qd    Seasonal allergic rhinitis due to other allergic trigger       fluticasone 50 MCG/BLIST Aepb    FLOVENT DISKUS     Inhale 1 puff into the lungs every 12 hours        gabapentin 100 MG capsule    NEURONTIN    90 capsule    Take 1 capsule (100 mg) by mouth every evening as needed    Poliomyelitis       hydrochlorothiazide 25 MG tablet    HYDRODIURIL    90 tablet    Take 1 tablet (25 mg) by mouth daily    Essential hypertension       lisinopril 20 MG tablet    PRINIVIL/ZESTRIL    90 tablet    Take 1 tablet (20 mg) by mouth daily    Essential hypertension       metFORMIN 500 MG tablet    GLUCOPHAGE     Take 500 mg by mouth daily (with dinner)        metoclopramide 10 MG tablet    REGLAN     Take 1-2 tabs as needed        multivitamin per tablet     100    ONE DAILY        nystatin cream    MYCOSTATIN     Apply topically daily as needed        nystatin-triamcinolone cream    MYCOLOG II     Apply topically daily as needed        ondansetron 4 MG tablet    ZOFRAN     Take 1 tablet by mouth every 6 hours as needed Reported on 3/20/2017        * order for DME     1 Units    Equipment being ordered: Oral appliance for sleep apnea    ANNETTE (obstructive sleep apnea)       * order for DME     2 each    Equipment being ordered: Compression stockings - Knee High; 20-30 mmHg compression    Insufficiency, arterial, peripheral (H), Edema       * order for DME     1 each    Donut Pillow    Coccydynia       ranitidine 300 MG tablet    ZANTAC    30 tablet    Take by mouth At Bedtime Reported on 2/14/2017        rosuvastatin 10 MG tablet    CRESTOR    30 tablet    Take 1 tablet (10 mg) by mouth daily    Hyperlipidemia LDL goal <100       SB LOW DOSE ASA EC 81 MG EC tablet   Generic  drug:  aspirin      Take 81 mg by mouth daily        sitagliptin 50 MG tablet    JANUVIA     Take 50 mg by mouth daily        sucralfate 1 GM/10ML suspension    CARAFATE     Take 10 mLs by mouth 3 times daily as needed        SYNTHROID 112 MCG tablet   Generic drug:  levothyroxine      Take 112 mcg by mouth daily Take 112 mcg daily except for Friday take 56 mcg.  Brand name Synthroid        VITAMIN D-3 PO      Take 2 tablets by mouth        * Notice:  This list has 3 medication(s) that are the same as other medications prescribed for you. Read the directions carefully, and ask your doctor or other care provider to review them with you.

## 2017-06-23 NOTE — PROGRESS NOTES
"Subjective:  Physical Therapy Initial Evaluation    Therapist Impression:   Chasity Hodgson is a 71 year old female patient presenting to Physical Therapy with: low back pain. These impairments limit their ability to sit to stand without pain. Skilled PT services are necessary in order to reduce impairments and improve independent function.    Precautions/Restrictions/MD instructions: Evaluate and treat       Subjective:    DOI/onset: 6/14/17  History of current symptoms: Back pain occurred after falling onto her buttocks stepping off of a curb. The most pain came on 2 weeks after the fall. Hurts when sitting on tailbone as she fell in (xrays negative). Poliomyelitis in right leg - which she wears an AFO on (Right leg is about an inch shorter than left and much weaker). This may make her low back pain worse but she has an appointment to adjust this brace. Pain comes and goes.    -Also has a colostomy bag  Primary pain location: Pain into both buttock  Quality/radiation: posterior right thigh, not past knee; sometimes into front (\"belt line\")  Exacerbated by: Difficult to say - \"maybe going from sitting to standing position; can't walk more than half a mile.\"  Bending over Relieved by: Tylenol  Pain: She denies having painful cough/sneeze, saddle anaesthesia, severe night pain, peripheral motor deficit, recent bowel/bladder change, recent vision change, ringing in the ears or pain with swallowing. Pain is rated 7/10 Currently  Previous Treatment: none    Imaging: X-rays: negative at pelvus, sacrum     Occupation:  [employer]  Job duties/Hobbies: prolonged sitting, repetitive work     PMH: DVT, venous insufficiency, shoulder surgery, PE, poliomyelitis, Sleep apnea, HTN, DMII; osteoarthritis, cancer, fibromyalgia, anemia, change in bowel/bladder (since before pain). Thyroid cancer (removed).  Medications: refer to medical chart; thyroid, sleep  Sleeping: Doesn't wake up at night because of her pain  "   Patient's goals: to decrease pain  General health as reported by patient: good.     Previous functional status:  fully functional prior to pain onset/injury.     HPI                    Objective:  LUMBAR:    Gait: slight limp as she has AFO on right side (polio)    Functional:  - Squat: decreased balance, slight pain    AROM: (Major, Moderate, Minimal or Nil loss)  Movement Loss Cristobal Mod Min Nil Pain   Flexion   x     Extension   x  x   Rotation Right        Rotation Left        Side bend Right  x   x   Side bend Left   x       Repeated movement testing: no preference    Hip PROM: Slightly decreased IR and ER on right    Neurological:    Motor Deficit:  Myotomes L R   L1-2 (hip flexion) 4/5 5/5   L3 (knee extension) 5 4/5 polio   L4 (ankle DF) 5 0   L5 (g. toe ext) 5 0   S1 (ankle PF or knee flex) 5      SLR: compensates for decreased rectus femoris strength but turning foot out and using adductors    Other strength:   -Glute: R 3+/5, L3+/5,    -Sidelying Hip abduction: R 3/5, L3/5    Sensory Deficit, Reflexes: Seated patellar reflexes: 2+ on left, unable to elicit on right  Light touch sensation: wnl    Dural Signs:   L R   Slump     SLR (+) at 60 degrees unable to DF foot (brace on)     Palpation: very TTP at tony QL and tony piriformis    Other Tests:    -SI: Pain over palpated area with ASIS gapping and thigh thrust but no pain over SI joints    Pelvic tilting control: fair    System    Physical Exam    General     ROS    Assessment/Plan:      Patient is a 71 year old female with lumbar complaints.    Patient has the following significant findings with corresponding treatment plan.                Diagnosis 1:  Low back pain with radiculopathy  Pain -  hot/cold therapy, US, manual therapy, self management, education, directional preference exercise and home program  Decreased ROM/flexibility - manual therapy, therapeutic exercise and home program  Decreased joint mobility - manual therapy, therapeutic exercise and  home program  Decreased strength - therapeutic exercise, therapeutic activities and home program  Decreased function - therapeutic activities and home program  Impaired posture - neuro re-education and home program    Therapy Evaluation Codes:   1) History comprised of:   Personal factors that impact the plan of care:      Past/current experiences and Profession.    Comorbidity factors that impact the plan of care are:      Cancer, Diabetes, Fibromyalgia, High blood pressure, Osteoarthritis and Overweight.  Poliomyelitis on right leg   Medications impacting care: High blood pressure, Sleep and thyroid.   2) Examination of Body Systems comprised of:   Body structures and functions that impact the plan of care:      Hip and Lumbar spine.   Activity limitations that impact the plan of care are:      Bending, Sitting, Walking and Working.  3) Clinical presentation characteristics are:   Stable/Uncomplicated.  4) Decision-Making    Low complexity using standardized patient assessment instrument and/or measureable assessment of functional outcome.  Cumulative Therapy Evaluation is: Low complexity.    Previous and current functional limitations:  (See Goal Flow Sheet for this information)    Short term and Long term goals: (See Goal Flow Sheet for this information)     Communication ability:  Patient appears to be able to clearly communicate and understand verbal and written communication and follow directions correctly.  Treatment Explanation - The following has been discussed with the patient:   RX ordered/plan of care  Anticipated outcomes  Possible risks and side effects  This patient would benefit from PT intervention to resume normal activities.   Rehab potential is good.    Frequency:  1 X week, once daily  Duration:  for 6 weeks  Discharge Plan:  Achieve all LTG.  Independent in home treatment program.  Reach maximal therapeutic benefit.    Please refer to the daily flowsheet for treatment today, total treatment  time and time spent performing 1:1 timed codes.

## 2017-07-07 ENCOUNTER — THERAPY VISIT (OUTPATIENT)
Dept: PHYSICAL THERAPY | Facility: CLINIC | Age: 72
End: 2017-07-07
Payer: MEDICARE

## 2017-07-07 DIAGNOSIS — M54.50 LUMBAGO: ICD-10-CM

## 2017-07-07 DIAGNOSIS — M54.41 BILATERAL LOW BACK PAIN WITH RIGHT-SIDED SCIATICA: ICD-10-CM

## 2017-07-07 PROCEDURE — 97140 MANUAL THERAPY 1/> REGIONS: CPT | Mod: GP | Performed by: PHYSICAL THERAPIST

## 2017-07-07 PROCEDURE — 97110 THERAPEUTIC EXERCISES: CPT | Mod: GP | Performed by: PHYSICAL THERAPIST

## 2017-07-20 ENCOUNTER — THERAPY VISIT (OUTPATIENT)
Dept: PHYSICAL THERAPY | Facility: CLINIC | Age: 72
End: 2017-07-20
Payer: MEDICARE

## 2017-07-20 DIAGNOSIS — M54.41 BILATERAL LOW BACK PAIN WITH RIGHT-SIDED SCIATICA: ICD-10-CM

## 2017-07-20 DIAGNOSIS — M54.50 LUMBAGO: ICD-10-CM

## 2017-07-20 PROCEDURE — 97530 THERAPEUTIC ACTIVITIES: CPT | Mod: GP | Performed by: PHYSICAL THERAPIST

## 2017-07-20 PROCEDURE — G8978 MOBILITY CURRENT STATUS: HCPCS | Mod: GP | Performed by: PHYSICAL THERAPIST

## 2017-07-20 PROCEDURE — 97140 MANUAL THERAPY 1/> REGIONS: CPT | Mod: GP | Performed by: PHYSICAL THERAPIST

## 2017-07-20 PROCEDURE — 97110 THERAPEUTIC EXERCISES: CPT | Mod: GP | Performed by: PHYSICAL THERAPIST

## 2017-07-20 PROCEDURE — G8979 MOBILITY GOAL STATUS: HCPCS | Mod: GP | Performed by: PHYSICAL THERAPIST

## 2017-07-20 PROCEDURE — G8980 MOBILITY D/C STATUS: HCPCS | Mod: GP | Performed by: PHYSICAL THERAPIST

## 2017-07-20 NOTE — MR AVS SNAPSHOT
After Visit Summary   7/20/2017    Chasity Hodgson    MRN: [id number]           Patient Information     Date Of Birth          1945        Visit Information        Provider Department      7/20/2017 12:30 PM Schoenecker, Jon, PT [facility]        Today's Diagnoses     Bilateral low back pain with right-sided sciatica        Lumbago           Follow-ups after your visit        Who to contact     If you have questions or need follow up information about today's clinic visit or your schedule please contact [facility] directly at [phone].  Normal or non-critical lab and imaging results will be communicated to you by [email or web address], letter or phone within 4 business days after the clinic has received the results. If you do not hear from us within 7 days, please contact the clinic through [email or web address] or phone. If you have a critical or abnormal lab result, we will notify you by phone as soon as possible.  Submit refill requests through [employer] or call your pharmacy and they will forward the refill request to us. Please allow 3 business days for your refill to be completed.          Additional Information About Your Visit        MyChart Information     [employer] gives you secure access to your electronic health record. If you see a primary care provider, you can also send messages to your care team and make appointments. If you have questions, please call your primary care clinic.  If you do not have a primary care provider, please call [phone] and they will assist you.        Care EveryWhere ID     This is your Care EveryWhere ID. This could be used by other organizations to access your [place] medical records  [id number]         Blood Pressure from Last 3 Encounters:   05/11/17 132/60   03/25/17 147/78   03/20/17 122/84    Weight from Last 3 Encounters:   06/14/17 94.8 kg (209 lb)   05/11/17 94.9 kg (209 lb 3.2 oz)   03/20/17 93 kg (205 lb)              We Performed the Following      [facility] PROGRESS NOTES REPORT     MANUAL THER TECH,1+REGIONS,EA 15 MIN     THERAPEUTIC ACTIVITIES     THERAPEUTIC EXERCISES        Primary Care Provider Office Phone # Fax #    Shola Benoit [phone]       [facility] [address] [place] [address] [facility] [address]  [facility] [address]        Equal Access to Services     JONA ROBB : Hadii aad ku hadasho Soomaali, waaxda luqadaha, qaybta kaalmada adeegyada, waxay idiin hayaan adeeg khdorinash la'aan . So [facility] [phone].    ATENCIÓN: Si habla español, tiene a modi disposición servicios gratuitos de asistencia lingüística. Romaine al [phone].    We comply with applicable federal civil rights laws and [place] laws. We do not discriminate on the basis of race, color, national origin, age, disability sex, sexual orientation or gender identity.            Thank you!     Thank you for choosing [facility]  for your care. Our goal is always to provide you with excellent care. Hearing back from our patients is one way we can continue to improve our services. Please take a few minutes to complete the written survey that you may receive in the mail after your visit with us. Thank you!             Your Updated Medication List - Protect others around you: Learn how to safely use, store and throw away your medicines at www.disposemymeds.org.          This list is accurate as of: 7/20/17  2:01 PM.  Always use your most recent med list.                   Brand Name Dispense Instructions for use Diagnosis    albuterol 108 (90 BASE) MCG/ACT Inhaler    PROAIR HFA/PROVENTIL HFA/VENTOLIN HFA    1 Inhaler    Inhale 2 puffs into the lungs every 6 hours as needed for shortness of breath / dyspnea or wheezing    Cough       ascorbic acid 1000 MG Tabs    vitamin C     Take 1,000 mg by mouth daily        atenolol 50 MG tablet    TENORMIN    180 tablet    Take 1 tablet (50 mg) by mouth 2 times daily    Essential hypertension       BIOTIN PO      Take 1,000 mcg by mouth         blood glucose monitoring test strip    no brand specified    1 Box    Use to test blood sugar 1 times daily or as directed.    Type 2 diabetes mellitus with other specified complication (H)       CALCIUM CITRATE + D PO      Take 2 tablets by mouth daily        cycloSPORINE 0.05 % ophthalmic emulsion    RESTASIS     1 drop 2 times daily        diphenhydrAMINE-acetaminophen  MG tablet    TYLENOL PM     Take 1 tablet by mouth At Bedtime Reported on 3/20/2017        emollient cream     60 g    Apply twice daily as needed to face. Mix half and half with 1% hydrocortisone    Dermatitis       esomeprazole 40 MG CR capsule    nexIUM     Take 40 mg by mouth 2 times daily Take 30-60 minutes before eating.        estradiol 1 MG tablet    ESTRACE     Take one tab daily        fenofibrate 145 MG tablet     90 tablet    Take 1 tablet (145 mg) by mouth daily    CARDIOVASCULAR SCREENING; LDL GOAL LESS THAN 130       ferrous sulfate 325 (65 FE) MG tablet    IRON     Take 1 tablet by mouth daily (with breakfast).        fexofenadine 180 MG tablet    ALLEGRA    120    Take 180 mg by mouth daily.        fluticasone 50 MCG/ACT spray    FLONASE    1 Bottle    Spray 2 sprays in nostril daily 2 sprays in each nostril qd    Seasonal allergic rhinitis due to other allergic trigger       fluticasone 50 MCG/BLIST Aepb    FLOVENT DISKUS     Inhale 1 puff into the lungs every 12 hours        gabapentin 100 MG capsule    NEURONTIN    90 capsule    Take 1 capsule (100 mg) by mouth every evening as needed    Poliomyelitis       hydrochlorothiazide 25 MG tablet    HYDRODIURIL    90 tablet    Take 1 tablet (25 mg) by mouth daily    Essential hypertension       lisinopril 20 MG tablet    PRINIVIL/ZESTRIL    90 tablet    Take 1 tablet (20 mg) by mouth daily    Essential hypertension       metFORMIN 500 MG tablet    GLUCOPHAGE     Take 500 mg by mouth daily (with dinner)        metoclopramide 10 MG tablet    REGLAN     Take 1-2 tabs as  needed        multivitamin per tablet     100    ONE DAILY        nystatin cream    MYCOSTATIN     Apply topically daily as needed        nystatin-triamcinolone cream    MYCOLOG II     Apply topically daily as needed        ondansetron 4 MG tablet    ZOFRAN     Take 1 tablet by mouth every 6 hours as needed Reported on 3/20/2017        * order for DME     1 Units    Equipment being ordered: Oral appliance for sleep apnea    ANNETTE (obstructive sleep apnea)       * order for DME     2 each    Equipment being ordered: Compression stockings - Knee High; 20-30 mmHg compression    Insufficiency, arterial, peripheral (H), Edema       * order for DME     1 each    Donut Pillow    Coccydynia       ranitidine 300 MG tablet    ZANTAC    30 tablet    Take by mouth At Bedtime Reported on 2/14/2017        rosuvastatin 10 MG tablet    CRESTOR    30 tablet    Take 1 tablet (10 mg) by mouth daily    Hyperlipidemia LDL goal <100       SB LOW DOSE ASA EC 81 MG EC tablet   Generic drug:  aspirin      Take 81 mg by mouth daily        sitagliptin 50 MG tablet    JANUVIA     Take 50 mg by mouth daily        sucralfate 1 GM/10ML suspension    CARAFATE     Take 10 mLs by mouth 3 times daily as needed        SYNTHROID 112 MCG tablet   Generic drug:  levothyroxine      Take 112 mcg by mouth daily Take 112 mcg daily except for Friday take 56 mcg.  Brand name Synthroid        VITAMIN D-3 PO      Take 2 tablets by mouth        * Notice:  This list has 3 medication(s) that are the same as other medications prescribed for you. Read the directions carefully, and ask your doctor or other care provider to review them with you.
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
## 2017-07-28 ENCOUNTER — TRANSFERRED RECORDS (OUTPATIENT)
Dept: HEALTH INFORMATION MANAGEMENT | Facility: CLINIC | Age: 72
End: 2017-07-28

## 2017-08-01 ENCOUNTER — TRANSFERRED RECORDS (OUTPATIENT)
Dept: HEALTH INFORMATION MANAGEMENT | Facility: CLINIC | Age: 72
End: 2017-08-01

## 2017-08-01 LAB
CHOLEST SERPL-MCNC: 202 MG/DL (ref 100–199)
GLUCOSE SERPL-MCNC: 211 MG/DL (ref 65–99)
HDLC SERPL-MCNC: 35 MG/DL
TRIGL SERPL-MCNC: 451 MG/DL (ref 0–149)

## 2017-08-11 ENCOUNTER — TELEPHONE (OUTPATIENT)
Dept: FAMILY MEDICINE | Facility: CLINIC | Age: 72
End: 2017-08-11

## 2017-08-11 NOTE — TELEPHONE ENCOUNTER
Reason for call:  Patient reporting a symptom    Symptom or request: burning in throat, pain in right side above waist     Duration (how long have symptoms been present): 1 month    Have you been treated for this before? No    Additional comments:     Phone Number patient can be reached at:  Home number on file [phone] (home)    Best Time:  anytime    Can we leave a detailed message on this number:  YES    Call taken on 8/11/2017 at 11:30 AM by Yamilet Lamas

## 2017-08-11 NOTE — TELEPHONE ENCOUNTER
Sammie called into clinic with c/o of center chest (esophagus/acid reflux) pain and also right side under right breast pain.  Patient reports that both pains have been going on for 1 month.   Episodes have become more frequent and last about 2 hours.  These pains come and go and seems to have 1 episode/day.  Missing work today due to not feeling well.   Patient denies any pain with swallowing  During episodes she feels nauseous and dry heaves.    Patient also reports of cough, but relates cough to feeling nauseous and dry heaving.   No severe weakness  No SOB  No dizziness  Feels slightly feverish but has not taken temperature.  Reports of mild headache.   Has had diarrhea, but patient thought it was related to Metformin. Denies blood in stool  Patient has been eating lightly. Has not eaten today yet because she feels nauseous.     Patient takes Zantac and protonix for acid reflux  Ondansetron taken for nausea. She has been taking twice daily due to feeling nauseous lately.     Blood Sugars have been elevated for the last couple weeks:  This morning BS was 181  Last night 214  Yesterday morning 194    PCP: No available appointments left today.   Would you like to squeeze patient on your schedule as work in?    Swathi Lomeli RN

## 2017-08-11 NOTE — TELEPHONE ENCOUNTER
I called and spoke with patient.   Explained no openings here at [facility]  Advised Urgent Care today.   Patient lives in [place] and said that she will try and get into Urgent Care at [facility].    Swathi Lomeli RN

## 2017-08-15 ENCOUNTER — HOSPITAL ENCOUNTER (OUTPATIENT)
Dept: GENERAL RADIOLOGY | Facility: CLINIC | Age: 72
Discharge: HOME | End: 2017-08-15
Attending: INTERNAL MEDICINE | Admitting: INTERNAL MEDICINE
Payer: MEDICARE

## 2017-08-15 DIAGNOSIS — R10.84 ABDOMINAL PAIN, GENERALIZED: ICD-10-CM

## 2017-08-15 PROCEDURE — 74245 XR UPPER GI W SMALL BOWEL FOLLOW THROUGH: CPT

## 2017-08-15 PROCEDURE — 25500045 ZZH RX 255: Performed by: INTERNAL MEDICINE

## 2017-08-15 RX ADMIN — ANTACID/ANTIFLATULENT 4 G: 380; 550; 10; 10 GRANULE, EFFERVESCENT ORAL at 13:07

## 2017-08-17 ENCOUNTER — TRANSFERRED RECORDS (OUTPATIENT)
Dept: HEALTH INFORMATION MANAGEMENT | Facility: CLINIC | Age: 72
End: 2017-08-17

## 2017-08-18 ENCOUNTER — TELEPHONE (OUTPATIENT)
Dept: FAMILY MEDICINE | Facility: CLINIC | Age: 72
End: 2017-08-18

## 2017-08-18 DIAGNOSIS — I73.9 INSUFFICIENCY, ARTERIAL, PERIPHERAL (H): ICD-10-CM

## 2017-08-18 NOTE — TELEPHONE ENCOUNTER
Reason for Call:  Other prescription    Detailed comments: pt is wondering if dr. cao will send another order to the medical equipment center on the 4th floor for compression stockings. Thanks!    Phone Number Patient can be reached at: Home number on file [phone] (home)    Best Time: anytime    Can we leave a detailed message on this number? YES    Call taken on 8/18/2017 at 1:48 PM by Afsaneh Mann

## 2017-08-18 NOTE — TELEPHONE ENCOUNTER
Pending Prescriptions:                       Disp   Refills    order for DME                             2 each 0            Sig: Equipment being ordered: Compression stockings -           Knee High; 20-30 mmHg compression          Last Written Prescription Date:  10/06/2016  Last Fill Quantity: 2 each,   # refills: 0  Last Office Visit with [facility], [facility] or [facility] prescribing provider: 06/14/2017  Future Office visit:       Routing refill request to provider for review/approval because:  DME

## 2017-08-29 ENCOUNTER — TRANSFERRED RECORDS (OUTPATIENT)
Dept: HEALTH INFORMATION MANAGEMENT | Facility: CLINIC | Age: 72
End: 2017-08-29

## 2017-08-31 ENCOUNTER — TRANSFERRED RECORDS (OUTPATIENT)
Dept: HEALTH INFORMATION MANAGEMENT | Facility: CLINIC | Age: 72
End: 2017-08-31

## 2017-09-12 ENCOUNTER — TELEPHONE (OUTPATIENT)
Dept: CARDIOLOGY | Facility: CLINIC | Age: 72
End: 2017-09-12

## 2017-09-12 DIAGNOSIS — I10 BENIGN ESSENTIAL HYPERTENSION: Primary | ICD-10-CM

## 2017-09-12 NOTE — TELEPHONE ENCOUNTER
Call from patient, she states she is still having issues with left-sided chest discomfort and the nausea/vomiting she discussed with Dr. Ortiz in May. Patient states she cannot connect any triggers with her symptoms such as exercise. She states the symptoms are random and intermittent. She can have nausea and vomiting every evening for a few days and then feel better for a 1-2 weeks and it starts again. Patient states the chest discomfort can last a range of 10 min to 45 min., no dyspnea with this. Patient states she has worked with Dr. Pantoja for GI issues and no cause can be found. Patient recalls discussing a stress echo with Dr. Ortiz in May and asks if it is time to have this done. Offered to review request with DOD but patient declined. She prefers to wait for Dr. Ortiz's return to plan this. Discussed with patient to seek ER assessment for escalating or intense symptoms. Patient verbalized understanding and agreed with plan.  Will message Dr. Ortiz to review

## 2017-09-18 NOTE — TELEPHONE ENCOUNTER
Left message to return call Patient's  states she worked over nights and is resting this am and will call back this afternoon.

## 2017-09-18 NOTE — TELEPHONE ENCOUNTER
Spoke with Patient's  and stress echo echocardiogram recommended, To take all medication day of test.  verbalized understanding. Patient will call with any questions or concerns.

## 2017-09-25 ENCOUNTER — HOSPITAL ENCOUNTER (OUTPATIENT)
Dept: MAMMOGRAPHY | Facility: CLINIC | Age: 72
Discharge: HOME | End: 2017-09-25
Attending: OBSTETRICS & GYNECOLOGY | Admitting: OBSTETRICS & GYNECOLOGY
Payer: MEDICARE

## 2017-09-25 DIAGNOSIS — Z12.31 VISIT FOR SCREENING MAMMOGRAM: ICD-10-CM

## 2017-09-25 PROCEDURE — G0202 SCR MAMMO BI INCL CAD: HCPCS

## 2017-09-25 PROCEDURE — 77063 BREAST TOMOSYNTHESIS BI: CPT

## 2017-09-26 DIAGNOSIS — Z13.6 CARDIOVASCULAR SCREENING; LDL GOAL LESS THAN 130: ICD-10-CM

## 2017-09-27 RX ORDER — FENOFIBRATE 145 MG/1
145 TABLET, COATED ORAL DAILY
Qty: 90 TABLET | Refills: 2 | Status: SHIPPED | OUTPATIENT
Start: 2017-09-27 | End: 2018-06-25

## 2017-09-27 NOTE — TELEPHONE ENCOUNTER
Pending Prescriptions:                       Disp   Refills    fenofibrate 145 MG tablet                 90 tab*2            Sig: Take 1 tablet (145 mg) by mouth daily           Last Written Prescription Date: 1/10/17  Last Fill Quantity: 90, # refills: 2    Last Office Visit with [facility], [facility] or [facility] prescribing provider:  6/14/17 Kaycee   Future Office Visit:    Next 5 appointments (look out 90 days)     Nov 08, 2017  8:15 AM CST   Return Visit with Andreea Ortiz MD   [facility] ([facility])    [address] [place] [id number] [phone]                  Cholesterol   Date Value Ref Range Status   08/01/2017 202 (H) 100 - 199 mg/dL Final     HDL Cholesterol   Date Value Ref Range Status   08/01/2017 35 (L) >39 mg/dL Final     LDL Cholesterol Calculated   Date Value Ref Range Status   02/15/2017 49 <130 mg/dL Final     Triglycerides   Date Value Ref Range Status   08/01/2017 451 (H) 0 - 149 mg/dL Final     Cholesterol/HDL Ratio   Date Value Ref Range Status   03/31/2015 4.6 0.0 - 5.0 Final     Chol/HDLC Ratio   Date Value Ref Range Status   02/15/2017 4.6 <OR-5.0 Final     ALT   Date Value Ref Range Status   01/16/2017 28 0 - 50 U/L Final        Leann Olguin, RT(R)

## 2017-09-27 NOTE — TELEPHONE ENCOUNTER
Routing refill request to provider for review/approval because:  Labs out of range:  HDL, Trigs     Marbella Aldridge, RN

## 2017-10-03 ENCOUNTER — HOSPITAL ENCOUNTER (OUTPATIENT)
Dept: CARDIOLOGY | Facility: CLINIC | Age: 72
Discharge: HOME | End: 2017-10-03
Attending: INTERNAL MEDICINE | Admitting: INTERNAL MEDICINE
Payer: MEDICARE

## 2017-10-03 ENCOUNTER — DOCUMENTATION ONLY (OUTPATIENT)
Dept: CARDIOLOGY | Facility: CLINIC | Age: 72
End: 2017-10-03

## 2017-10-03 DIAGNOSIS — I10 BENIGN ESSENTIAL HYPERTENSION: ICD-10-CM

## 2017-10-03 PROCEDURE — 93321 DOPPLER ECHO F-UP/LMTD STD: CPT | Mod: 26 | Performed by: INTERNAL MEDICINE

## 2017-10-03 PROCEDURE — 93018 CV STRESS TEST I&R ONLY: CPT | Performed by: INTERNAL MEDICINE

## 2017-10-03 PROCEDURE — 93350 STRESS TTE ONLY: CPT | Mod: 26 | Performed by: INTERNAL MEDICINE

## 2017-10-03 PROCEDURE — 25500064 ZZH RX 255 OP 636: Performed by: INTERNAL MEDICINE

## 2017-10-03 PROCEDURE — 40000264 ECHO STRESS WITH OPTISON

## 2017-10-03 PROCEDURE — 93325 DOPPLER ECHO COLOR FLOW MAPG: CPT | Mod: 26 | Performed by: INTERNAL MEDICINE

## 2017-10-03 PROCEDURE — 93016 CV STRESS TEST SUPVJ ONLY: CPT | Performed by: INTERNAL MEDICINE

## 2017-10-03 RX ADMIN — HUMAN ALBUMIN MICROSPHERES AND PERFLUTREN 3 ML: 10; .22 INJECTION, SOLUTION INTRAVENOUS at 14:47

## 2017-10-03 NOTE — PROGRESS NOTES
"Stress echo 10/3/17 noted, per reader testing was non-diagnostic. Patient has OV 11/8/17 with Dr. Ortiz  Will message Dr. Ortiz to review    Per Dr. Ortiz's recommendation: \" I didn't expect her to reach target HR. Good result - no ischemia. I would not recommend further testing now. Thanks. CD\"    Attempted to contact patient to review stress echo results and Dr. Ortiz's review, left message with spouse for patient to call back.  "

## 2017-10-04 NOTE — TELEPHONE ENCOUNTER
I didn't expect her to reach target HR.  Good result - no ischemia. I would not recommend further testing now.  Thanks. CD

## 2017-10-24 ENCOUNTER — PRE VISIT (OUTPATIENT)
Dept: CARDIOLOGY | Facility: CLINIC | Age: 72
End: 2017-10-24

## 2017-10-25 ENCOUNTER — DOCUMENTATION ONLY (OUTPATIENT)
Dept: CARDIOLOGY | Facility: CLINIC | Age: 72
End: 2017-10-25

## 2017-10-25 DIAGNOSIS — I10 ESSENTIAL HYPERTENSION: ICD-10-CM

## 2017-10-25 RX ORDER — LISINOPRIL 20 MG/1
20 TABLET ORAL DAILY
Qty: 90 TABLET | Refills: 2 | Status: SHIPPED | OUTPATIENT
Start: 2017-10-25 | End: 2018-07-23

## 2017-10-25 NOTE — TELEPHONE ENCOUNTER
Prescription approved per [facility] Refill Protocol.  Darya Zamarripa RN

## 2017-10-25 NOTE — TELEPHONE ENCOUNTER
Pending Prescriptions:                       Disp   Refills    lisinopril (PRINIVIL/ZESTRIL) 20 MG [id number] tab*2            Sig: Take 1 tablet (20 mg) by mouth daily          Last Written Prescription Date:  1/10/17  Last Fill Quantity: 90,   # refills: 2  Future Office visit:  6/14/17  Next 5 appointments (look out 90 days)     Nov 08, 2017  8:15 AM CST   Return Visit with Andreea Ortiz MD   [facility] ([facility])    [address] [place] [id number] [phone]                   Routing refill request to provider for review/approval because:

## 2017-10-30 DIAGNOSIS — I10 ESSENTIAL HYPERTENSION: ICD-10-CM

## 2017-11-01 RX ORDER — ATENOLOL 50 MG/1
50 TABLET ORAL 2 TIMES DAILY
Qty: 180 TABLET | Refills: 0 | Status: SHIPPED | OUTPATIENT
Start: 2017-11-01 | End: 2018-02-05

## 2017-11-01 NOTE — TELEPHONE ENCOUNTER
Prescription approved per [facility] Refill Protocol.  Angélica DORADO RN    Requested Prescriptions   Pending Prescriptions Disp Refills     atenolol (TENORMIN) 50 MG tablet 180 tablet 2     Sig: Take 1 tablet (50 mg) by mouth 2 times daily    Beta-Blockers Protocol Passed    10/30/2017  4:13 PM       Passed - Blood pressure under 140/90    BP Readings from Last 3 Encounters:   05/11/17 132/60   03/25/17 147/78   03/20/17 122/84                Passed - Patient is age 6 or older       Passed - Recent or future visit with authorizing provider's specialty    Patient had office visit in the last year or has a visit in the next 30 days with authorizing provider.  See chart review.

## 2017-11-08 ENCOUNTER — OFFICE VISIT (OUTPATIENT)
Dept: CARDIOLOGY | Facility: CLINIC | Age: 72
End: 2017-11-08
Attending: INTERNAL MEDICINE
Payer: MEDICARE

## 2017-11-08 VITALS
BODY MASS INDEX: 37.97 KG/M2 | DIASTOLIC BLOOD PRESSURE: 75 MMHG | HEART RATE: 69 BPM | WEIGHT: 206.3 LBS | SYSTOLIC BLOOD PRESSURE: 119 MMHG | HEIGHT: 62 IN

## 2017-11-08 DIAGNOSIS — I10 BENIGN ESSENTIAL HYPERTENSION: ICD-10-CM

## 2017-11-08 DIAGNOSIS — E78.2 MIXED HYPERLIPIDEMIA: Primary | ICD-10-CM

## 2017-11-08 DIAGNOSIS — R07.89 ATYPICAL CHEST PAIN: ICD-10-CM

## 2017-11-08 PROCEDURE — 99214 OFFICE O/P EST MOD 30 MIN: CPT | Performed by: INTERNAL MEDICINE

## 2017-11-08 RX ORDER — TRIAMCINOLONE ACETONIDE 1 MG/G
CREAM TOPICAL DAILY
COMMUNITY
End: 2020-01-21

## 2017-11-08 RX ORDER — FAMOTIDINE 40 MG/1
40 TABLET, FILM COATED ORAL PRN
COMMUNITY
End: 2020-09-25

## 2017-11-08 RX ORDER — CLOTRIMAZOLE 1 %
CREAM (GRAM) TOPICAL PRN
COMMUNITY

## 2017-11-08 RX ORDER — NITROGLYCERIN 0.4 MG/1
0.4 TABLET SUBLINGUAL EVERY 5 MIN PRN
Qty: 25 TABLET | Refills: 3 | Status: SHIPPED | OUTPATIENT
Start: 2017-11-08 | End: 2022-12-05

## 2017-11-08 RX ORDER — PANTOPRAZOLE SODIUM 40 MG/1
40 TABLET, DELAYED RELEASE ORAL 2 TIMES DAILY
COMMUNITY

## 2017-11-08 NOTE — LETTER
11/8/2017    Shola Benoit MD  [address]  [facility] [address]    RE: Chasity Hodgson       Dear Colleague,    I had the pleasure of seeing Chasity Hodgson in the [facility].    HISTORY OF PRESENT ILLNESS:  Ms. Chasity Hodgson was seen in followup at [facility] in [place].  She is now 72 years old and has previously been seen for atypical chest discomfort, hypertension and lower extremity edema.      In the past, she has been evaluated for atypical chest discomfort with CT coronary angiography eventually demonstrating an absence of obstructive coronary disease.  That CT angiogram was performed in 2011.  Her last stress nuclear study was in 2014 at which time there was no evidence of ischemia or infarct.  She has normal left ventricular systolic performance.      She does have evidence of plaque in her lower extremity arterial circulation.  There was no evidence of obstructive disease based on Dr. Lazo's evaluation in 2016.  She underwent Holter monitoring before her last visit which she requested for re-evaluation of chest discomfort.  The Holter monitor demonstrated  normal sinus rhythm with average heart rate variability and isolated PVCs of which there were only 2 and rare PACs.      Last Spring, she developed chest pressure and nausea leading to some vomiting. She has had episodes that have been similar. The computer when she is supine and in bed. If she rolls to her side, the symptom resolves. This does not occur every night but rather occurs intermittently. She is seeing Dr. Pantoja of gastroenterology and she is on Protonix. She has not tried either antacids or nitroglycerin with the episodes. The episodes are unassociated with shortness of breath or radiation of the discomfort.    Eventually, she underwent stress echocardiography. She said she would be able to exercise despite her right leg brace and her history of polio. She was able to exercise 4 minutes on  "the Ankit protocol which actually represents a significant effort given her low stature. She reached only 71% of her age-predicted heart rate (but continued on beta blockade for the study) and there was no ischemia or reproduction of chest discomfort.     Her blood pressure has previously been elevated and now has been improved She continues on atenolol, lisinopril and low-dose hydrochlorothiazide.      She does have a history of carotid disease as well and I note that in 2015 Dr. Benoit ordered a carotid ultrasound which demonstrated a 50-69% stenosis in the left internal carotid and a less than 50% stenosis in the right internal carotid artery.  She does continue on low dose aspirin, statin therapy and the ACE inhibition and beta blockade described above.  She has known normal left ventricular systolic performance.  Her last LDL fraction was not measurable given the rise in TG's and she has a low HDL of 31 and total cholesterol of 202 mg/dL.  Her history is also significant for diabetes mellitus, reactive airway disease, a prior history of polio, a prior colostomy and a prior history of thyroid cancer.      PHYSICAL EXAMINATION:     GENERAL:  This is a woman who appears younger than her stated age.   VITAL SIGNS:Blood pressure 119/75, pulse 69, height 1.562 m (5' 1.5\"), weight 93.6 kg (206 lb 4.8 oz), not currently breastfeeding.    ;respiratory rate 14-18 per minute.   CHEST:  Clear to auscultation.   CARDIAC:  On cardiac auscultation, there was an S1 and an S2 without extra sounds or a murmur.  The rhythm was regular.      ASSESSMENT:  Ms. Hodgson had atypical chest discomfort associated with a very stressful event.  We discussed it at length again. Given her performance on the stress echocardiogram, it seems less likely that her symptoms represent coronary ischemia.    She agreed. I did prescribe sublingual nitroglycerin and asked her to try the nitroglycerin several times to see if the symptoms are relieved " within minutes. I also suggested that she try Tums for some of the episodes. This could improve our understanding of the etiology of the episodes. On    If her symptoms are relieved with nitroglycerin and not with antacids, then I would recommend further evaluation. I would probably start with a CT coronary angiogram.  If she develops typical angina which has an exertional or supine component, I would recommend coronary angiography.  To follow up on these symptoms, I recommended a return at 6 weeks with Marilu Lemos, nurse practitioner. She will follow-up with myself in 4 months.     With regard to risk factor intervention, she is now experiencing an increase in triglycerides. Her LDL has previously been low. Likely the rise in triglycerides in August was related to her temporary discontinuation of metformin and an elevation of her hemoglobin A1c.     With regard to her history of hypertension, her blood pressure would appear to be controlled.  I suspect it was not as well controlled during the events which she described and that likely contributed to her symptoms.     Thank you for allowing me to participate in the care of your patient.    Sincerely,     Andreea Ortiz MD     [facility]

## 2017-11-08 NOTE — PROGRESS NOTES
HISTORY OF PRESENT ILLNESS:  Ms. Chasity Hodgson was seen in followup at [facility] in [place].  She is now 72 years old and has previously been seen for atypical chest discomfort, hypertension and lower extremity edema.      In the past, she has been evaluated for atypical chest discomfort with CT coronary angiography eventually demonstrating an absence of obstructive coronary disease.  That CT angiogram was performed in 2011.  Her last stress nuclear study was in 2014 at which time there was no evidence of ischemia or infarct.  She has normal left ventricular systolic performance.      She does have evidence of plaque in her lower extremity arterial circulation.  There was no evidence of obstructive disease based on Dr. Lazo's evaluation in 2016.  She underwent Holter monitoring before her last visit which she requested for re-evaluation of chest discomfort.  The Holter monitor demonstrated  normal sinus rhythm with average heart rate variability and isolated PVCs of which there were only 2 and rare PACs.      Last Spring, she developed chest pressure and nausea leading to some vomiting. She has had episodes that have been similar. The computer when she is supine and in bed. If she rolls to her side, the symptom resolves. This does not occur every night but rather occurs intermittently. She is seeing Dr. Pantoja of gastroenterology and she is on Protonix. She has not tried either antacids or nitroglycerin with the episodes. The episodes are unassociated with shortness of breath or radiation of the discomfort.    Eventually, she underwent stress echocardiography. She said she would be able to exercise despite her right leg brace and her history of polio. She was able to exercise 4 minutes on the Ankit protocol which actually represents a significant effort given her low stature. She reached only 71% of her age-predicted heart rate (but continued on beta blockade for the study) and there was no ischemia or  "reproduction of chest discomfort.     Her blood pressure has previously been elevated and now has been improved She continues on atenolol, lisinopril and low-dose hydrochlorothiazide.      She does have a history of carotid disease as well and I note that in 2015 Dr. Benoit ordered a carotid ultrasound which demonstrated a 50-69% stenosis in the left internal carotid and a less than 50% stenosis in the right internal carotid artery.  She does continue on low dose aspirin, statin therapy and the ACE inhibition and beta blockade described above.  She has known normal left ventricular systolic performance.  Her last LDL fraction was not measurable given the rise in TG's and she has a low HDL of 31 and total cholesterol of 202 mg/dL.  Her history is also significant for diabetes mellitus, reactive airway disease, a prior history of polio, a prior colostomy and a prior history of thyroid cancer.      PHYSICAL EXAMINATION:     GENERAL:  This is a woman who appears younger than her stated age.   VITAL SIGNS:Blood pressure 119/75, pulse 69, height 1.562 m (5' 1.5\"), weight 93.6 kg (206 lb 4.8 oz), not currently breastfeeding.    ;respiratory rate 14-18 per minute.   CHEST:  Clear to auscultation.   CARDIAC:  On cardiac auscultation, there was an S1 and an S2 without extra sounds or a murmur.  The rhythm was regular.      ASSESSMENT:  Ms. Hodgson had atypical chest discomfort associated with a very stressful event.  We discussed it at length again. Given her performance on the stress echocardiogram, it seems less likely that her symptoms represent coronary ischemia.    She agreed. I did prescribe sublingual nitroglycerin and asked her to try the nitroglycerin several times to see if the symptoms are relieved within minutes. I also suggested that she try Tums for some of the episodes. This could improve our understanding of the etiology of the episodes. On    If her symptoms are relieved with nitroglycerin and not with " antacids, then I would recommend further evaluation. I would probably start with a CT coronary angiogram.  If she develops typical angina which has an exertional or supine component, I would recommend coronary angiography.  To follow up on these symptoms, I recommended a return at 6 weeks with Marilu Lemos, nurse practitioner. She will follow-up with myself in 4 months.     With regard to risk factor intervention, she is now experiencing an increase in triglycerides. Her LDL has previously been low. Likely the rise in triglycerides in August was related to her temporary discontinuation of metformin and an elevation of her hemoglobin A1c.     With regard to her history of hypertension, her blood pressure would appear to be controlled.  I suspect it was not as well controlled during the events which she described and that likely contributed to her symptoms.         EDWIN BEARD MD

## 2017-11-08 NOTE — PATIENT INSTRUCTIONS
1. If you wake up with the chest pain, once or twice try the nitroglycerin under your tongue.  You can also try Tums another several times.

## 2017-11-08 NOTE — MR AVS SNAPSHOT
After Visit Summary   11/8/2017    Chasity Hodgson    MRN: [id number]           Patient Information     Date Of Birth          1945        Visit Information        Provider Department      11/8/2017 8:15 AM Andreea Ortiz MD [facility]        Today's Diagnoses     Mixed hyperlipidemia    -  1    Atypical chest pain        Benign essential hypertension          Care Instructions    1. If you wake up with the chest pain, once or twice try the nitroglycerin under your tongue.  You can also try Tums another several times.          Follow-ups after your visit        Additional Services     Follow-Up with Cardiac Advanced Practice Provider           Follow-Up with Cardiologist                 Future tests that were ordered for you today     Open Future Orders        Priority Expected Expires Ordered    Follow-Up with Cardiologist Routine 3/8/2018 11/8/2018 11/8/2017    Follow-Up with Cardiac Advanced Practice Provider Routine 12/22/2017 11/8/2018 11/8/2017            Who to contact     If you have questions or need follow up information about today's clinic visit or your schedule please contact [facility] directly at [phone].  Normal or non-critical lab and imaging results will be communicated to you by Koinifyhart, letter or phone within 4 business days after the clinic has received the results. If you do not hear from us within 7 days, please contact the clinic through Koinifyhart or phone. If you have a critical or abnormal lab result, we will notify you by phone as soon as possible.  Submit refill requests through Audigence or call your pharmacy and they will forward the refill request to us. Please allow 3 business days for your refill to be completed.          Additional Information About Your Visit        MyChart Information     Audigence gives you secure access to your electronic health record. If you see a primary care  "provider, you can also send messages to your care team and make appointments. If you have questions, please call your primary care clinic.  If you do not have a primary care provider, please call [phone] and they will assist you.        Care EveryWhere ID     This is your Care EveryWhere ID. This could be used by other organizations to access your [facility] medical records  [id number]        Your Vitals Were     Pulse Height BMI (Body Mass Index)             69 1.562 m (5' 1.5\") 38.35 kg/m2          Blood Pressure from Last 3 Encounters:   11/08/17 119/75   05/11/17 132/60   03/25/17 147/78    Weight from Last 3 Encounters:   11/08/17 93.6 kg (206 lb 4.8 oz)   06/14/17 94.8 kg (209 lb)   05/11/17 94.9 kg (209 lb 3.2 oz)              We Performed the Following     Follow-Up with Cardiologist          Today's Medication Changes          These changes are accurate as of: 11/8/17  8:56 AM.  If you have any questions, ask your nurse or doctor.               Start taking these medicines.        Dose/Directions    nitroGLYcerin 0.4 MG sublingual tablet   Commonly known as:  NITROSTAT   Used for:  Atypical chest pain   Started by:  Andreea Ortiz MD        Dose:  0.4 mg   Place 1 tablet (0.4 mg) under the tongue every 5 minutes as needed for chest pain (no more than 3 in one hour; after 3rd, call 911.)   Quantity:  25 tablet   Refills:  3            Where to get your medicines      These medications were sent to [facility] [address]  [address] [facility] [address]     Phone:  [phone]     nitroGLYcerin 0.4 MG sublingual tablet                Primary Care Provider Office Phone # Fax #    Shola Benoit [phone] [address] [facility] [address]  [facility] [address]        Equal Access to Services     CORINNE ROBB AH: Bang milton Sobhavna, waaxda luqadaha, qaybta kaalmajoshua cordero, jose kim. So [facility] " [phone].    ATENCIÓN: Si santos miller, tiene a modi disposición servicios gratuitos de asistencia lingüística. Romaine carr [phone].    We comply with applicable federal civil rights laws and [place] laws. We do not discriminate on the basis of race, color, national origin, age, disability, sex, sexual orientation, or gender identity.            Thank you!     Thank you for choosing [facility]  for your care. Our goal is always to provide you with excellent care. Hearing back from our patients is one way we can continue to improve our services. Please take a few minutes to complete the written survey that you may receive in the mail after your visit with us. Thank you!             Your Updated Medication List - Protect others around you: Learn how to safely use, store and throw away your medicines at www.disposemymeds.org.          This list is accurate as of: 11/8/17  8:56 AM.  Always use your most recent med list.                   Brand Name Dispense Instructions for use Diagnosis    ascorbic acid 1000 MG Tabs    vitamin C     Take 1,000 mg by mouth daily        ASTEPRO NA           atenolol 50 MG tablet    TENORMIN    180 tablet    Take 1 tablet (50 mg) by mouth 2 times daily    Essential hypertension       BIOTIN PO      Take 1,000 mcg by mouth        blood glucose monitoring test strip    no brand specified    1 Box    Use to test blood sugar 1 times daily or as directed.    Type 2 diabetes mellitus with other specified complication (H)       clotrimazole 1 % cream    LOTRIMIN     Apply topically daily        cycloSPORINE 0.05 % ophthalmic emulsion    RESTASIS     1 drop 2 times daily        diphenhydrAMINE-acetaminophen  MG tablet    TYLENOL PM     Take 1 tablet by mouth At Bedtime Reported on 3/20/2017        famotidine 40 MG tablet    PEPCID     Take 40 mg by mouth At Bedtime        fenofibrate 145 MG tablet     90 tablet    Take 1 tablet (145 mg) by mouth  daily    CARDIOVASCULAR SCREENING; LDL GOAL LESS THAN 130       ferrous sulfate 325 (65 FE) MG tablet    IRON     Take 1 tablet by mouth daily (with breakfast).        fexofenadine 180 MG tablet    ALLEGRA    120    Take 180 mg by mouth daily.        fluticasone 50 MCG/ACT spray    FLONASE    1 Bottle    Spray 2 sprays in nostril daily 2 sprays in each nostril qd    Seasonal allergic rhinitis due to other allergic trigger       gabapentin 100 MG capsule    NEURONTIN    90 capsule    Take 1 capsule (100 mg) by mouth every evening as needed    Poliomyelitis       hydrochlorothiazide 25 MG tablet    HYDRODIURIL    90 tablet    Take 1 tablet (25 mg) by mouth daily    Essential hypertension       lisinopril 20 MG tablet    PRINIVIL/ZESTRIL    90 tablet    Take 1 tablet (20 mg) by mouth daily    Essential hypertension       metFORMIN 500 MG tablet    GLUCOPHAGE     Take 500 mg by mouth daily (with dinner)        metoclopramide 10 MG tablet    REGLAN     Take 1-2 tabs as needed        multivitamin per tablet     100    ONE DAILY        nitroGLYcerin 0.4 MG sublingual tablet    NITROSTAT    25 tablet    Place 1 tablet (0.4 mg) under the tongue every 5 minutes as needed for chest pain (no more than 3 in one hour; after 3rd, call 911.)    Atypical chest pain       nystatin cream    MYCOSTATIN     Apply topically daily as needed        nystatin-triamcinolone cream    MYCOLOG II     Apply topically daily as needed        ondansetron 4 MG tablet    ZOFRAN     Take 1 tablet by mouth every 6 hours as needed Reported on 3/20/2017        * order for DME     1 Units    Equipment being ordered: Oral appliance for sleep apnea    ANNETTE (obstructive sleep apnea)       * order for DME     1 each    Donut Pillow    Coccydynia       * order for DME     2 each    Equipment being ordered: Compression stockings - Knee High; 20-30 mmHg compression    Insufficiency, arterial, peripheral (H)       pantoprazole 40 MG EC tablet    PROTONIX     Take  40 mg by mouth 2 times daily        SB LOW DOSE ASA EC 81 MG EC tablet   Generic drug:  aspirin      Take 81 mg by mouth daily        sitagliptin 50 MG tablet    JANUVIA     Take 50 mg by mouth daily        sucralfate 1 GM/10ML suspension    CARAFATE     Take 10 mLs by mouth 3 times daily as needed        SYNTHROID 112 MCG tablet   Generic drug:  levothyroxine      Take 112 mcg by mouth daily Take 112 mcg daily except for Friday take 56 mcg.  Brand name Synthroid        TOUJEO SOLOSTAR 300 UNIT/ML injection   Generic drug:  insulin glargine U-300      Inject Subcutaneous every morning        triamcinolone 0.1 % cream    KENALOG     Apply topically daily        VITAMIN D-3 PO      Take 2 tablets by mouth        * Notice:  This list has 3 medication(s) that are the same as other medications prescribed for you. Read the directions carefully, and ask your doctor or other care provider to review them with you.

## 2017-11-22 DIAGNOSIS — I10 ESSENTIAL HYPERTENSION: ICD-10-CM

## 2017-11-24 RX ORDER — HYDROCHLOROTHIAZIDE 25 MG/1
25 TABLET ORAL DAILY
Qty: 90 TABLET | Refills: 3 | Status: SHIPPED | OUTPATIENT
Start: 2017-11-24 | End: 2018-12-11

## 2017-11-24 NOTE — TELEPHONE ENCOUNTER
Routing refill request to provider for review/approval because:  Labs out of range:  Last sodium lab low  Floresita ROSE RN      Requested Prescriptions   Pending Prescriptions Disp Refills     hydrochlorothiazide (HYDRODIURIL) 25 MG tablet 90 tablet 2     Sig: Take 1 tablet (25 mg) by mouth daily    Diuretics (Including Combos) Protocol Failed    11/22/2017  8:25 PM       Failed - Normal serum sodium on file in past 12 months    Recent Labs   Lab Test 02/15/17   NA  132           Passed - Blood pressure under 140/90    BP Readings from Last 3 Encounters:   11/08/17 119/75   05/11/17 132/60   03/25/17 147/78          Passed - Recent or future visit with authorizing provider's specialty    Patient had office visit in the last year or has a visit in the next 30 days with authorizing provider.  See chart review.            Passed - Patient is age 18 or older       Passed - No active pregancy on record       Passed - Normal serum creatinine on file in past 12 months    Recent Labs   Lab Test 02/15/17   CR  0.94           Passed - Normal serum potassium on file in past 12 months    Recent Labs   Lab Test 02/15/17   POTASSIUM  4.2           Passed - No positive pregnancy test in past 12 months

## 2017-12-10 ENCOUNTER — OFFICE VISIT (OUTPATIENT)
Dept: URGENT CARE | Facility: URGENT CARE | Age: 72
End: 2017-12-10
Payer: MEDICARE

## 2017-12-10 VITALS
SYSTOLIC BLOOD PRESSURE: 156 MMHG | OXYGEN SATURATION: 97 % | WEIGHT: 205.31 LBS | HEART RATE: 63 BPM | BODY MASS INDEX: 38.17 KG/M2 | TEMPERATURE: 97.2 F | DIASTOLIC BLOOD PRESSURE: 71 MMHG

## 2017-12-10 DIAGNOSIS — H69.91 DYSFUNCTION OF RIGHT EUSTACHIAN TUBE: Primary | ICD-10-CM

## 2017-12-10 PROCEDURE — 99213 OFFICE O/P EST LOW 20 MIN: CPT | Performed by: PHYSICIAN ASSISTANT

## 2017-12-10 RX ORDER — FLUTICASONE PROPIONATE 50 MCG
2 SPRAY, SUSPENSION (ML) NASAL DAILY
Qty: 16 G | Refills: 0 | Status: SHIPPED | OUTPATIENT
Start: 2017-12-10 | End: 2017-12-20

## 2017-12-10 NOTE — PATIENT INSTRUCTIONS
(H69.81) Dysfunction of right eustachian tube  (primary encounter diagnosis)  Comment:   Plan: fluticasone (FLONASE) 50 MCG/ACT spray        Tylenol prn.     F/U with primary clinic should symptoms persist or worsen.

## 2017-12-10 NOTE — MR AVS SNAPSHOT
After Visit Summary   12/10/2017    Chasity Hodgson    MRN: [id number]           Patient Information     Date Of Birth          1945        Visit Information        Provider Department      12/10/2017 3:30 PM Meme Perry PA-C [facility]        Today's Diagnoses     Dysfunction of right eustachian tube    -  1      Care Instructions      (H69.81) Dysfunction of right eustachian tube  (primary encounter diagnosis)  Comment:   Plan: fluticasone (FLONASE) 50 MCG/ACT spray        Tylenol prn.     F/U with primary clinic should symptoms persist or worsen.              Follow-ups after your visit        Your next 10 appointments already scheduled     Dec 20, 2017 10:30 AM CST   Return Visit with [facility] ([facility])    [address] [place] [address]   [phone]            Dec 22, 2017 11:00 AM CST   (Arrive by 10:45 AM)   RETURN HAIRLOSS with Renetta Meyer MD   [facility] ([facility])    [address] [place] [id number] [phone]            Mar 12, 2018 10:30 AM CDT   (Arrive by 10:15 AM)   RETURN HAIRLOSS with Renetta Meyer MD   [facility])    [address] [place] [id number] [phone]              Who to contact     If you have questions or need follow up information about today's clinic visit or your schedule please contact [facility] directly at [phone].  Normal or non-critical lab and imaging results will be communicated to you by MyChart, letter or phone within 4 business days after the clinic has received the results. If you do not hear from us within 7 days, please contact the clinic through MyChart or phone. If you have a critical or  abnormal lab result, we will notify you by phone as soon as possible.  Submit refill requests through DiBcom or call your pharmacy and they will forward the refill request to us. Please allow 3 business days for your refill to be completed.          Additional Information About Your Visit        ShareDeskhart Information     DiBcom gives you secure access to your electronic health record. If you see a primary care provider, you can also send messages to your care team and make appointments. If you have questions, please call your primary care clinic.  If you do not have a primary care provider, please call [phone] and they will assist you.        Care EveryWhere ID     This is your Care EveryWhere ID. This could be used by other organizations to access your [facility] medical records  [id number]        Your Vitals Were     Pulse Temperature Pulse Oximetry Breastfeeding? BMI (Body Mass Index)       63 97.2  F (36.2  C) (Oral) 97% No 38.17 kg/m2        Blood Pressure from Last 3 Encounters:   12/10/17 156/71   11/08/17 119/75   05/11/17 132/60    Weight from Last 3 Encounters:   12/10/17 205 lb 5 oz (93.1 kg)   11/08/17 206 lb 4.8 oz (93.6 kg)   06/14/17 209 lb (94.8 kg)              Today, you had the following     No orders found for display         Today's Medication Changes          These changes are accurate as of: 12/10/17  4:26 PM.  If you have any questions, ask your nurse or doctor.               These medicines have changed or have updated prescriptions.        Dose/Directions    * fluticasone 50 MCG/ACT spray   Commonly known as:  FLONASE   This may have changed:  Another medication with the same name was added. Make sure you understand how and when to take each.   Used for:  Seasonal allergic rhinitis due to other allergic trigger   Changed by:  Abhishek Patino PA-C        Dose:  2 spray   Spray 2 sprays in nostril daily 2 sprays in each nostril qd   Quantity:  1 Bottle   Refills:  0       * fluticasone  50 MCG/ACT spray   Commonly known as:  FLONASE   This may have changed:  You were already taking a medication with the same name, and this prescription was added. Make sure you understand how and when to take each.   Used for:  Dysfunction of right eustachian tube   Changed by:  Meme Perry PA-C        Dose:  2 spray   Spray 2 sprays into both nostrils daily   Quantity:  16 g   Refills:  0       * Notice:  This list has 2 medication(s) that are the same as other medications prescribed for you. Read the directions carefully, and ask your doctor or other care provider to review them with you.         Where to get your medicines      These medications were sent to [facility] [address] [address]     Phone:  [phone]     fluticasone 50 MCG/ACT spray                Primary Care Provider Office Phone # Fax #    Shola Benoit [phone] [address] [facility] [address]  [facility] [address]        Equal Access to Services     [facility]: Hadii aad ku hadasho Soomaali, waaxda luqadaha, qaybta kaalmada adeegyada, waxay cyndiin hayantonella baldwin . So [facility] [phone].    ATENCIÓN: Si habla español, tiene a modi disposición servicios gratuitos de asistencia lingüística. Llame al [phone].    We comply with applicable federal civil rights laws and [place] laws. We do not discriminate on the basis of race, color, national origin, age, disability, sex, sexual orientation, or gender identity.            Thank you!     Thank you for choosing [facility]  for your care. Our goal is always to provide you with excellent care. Hearing back from our patients is one way we can continue to improve our services. Please take a few minutes to complete the written survey that you may receive in the mail after your visit with us. Thank you!             Your Updated Medication List -  Protect others around you: Learn how to safely use, store and throw away your medicines at www.disposemymeds.org.          This list is accurate as of: 12/10/17  4:26 PM.  Always use your most recent med list.                   Brand Name Dispense Instructions for use Diagnosis    ascorbic acid 1000 MG Tabs    vitamin C     Take 1,000 mg by mouth daily        ASTEPRO NA           atenolol 50 MG tablet    TENORMIN    180 tablet    Take 1 tablet (50 mg) by mouth 2 times daily    Essential hypertension       BIOTIN PO      Take 1,000 mcg by mouth        blood glucose monitoring test strip    no brand specified    1 Box    Use to test blood sugar 1 times daily or as directed.    Type 2 diabetes mellitus with other specified complication (H)       clotrimazole 1 % cream    LOTRIMIN     Apply topically daily        cycloSPORINE 0.05 % ophthalmic emulsion    RESTASIS     1 drop 2 times daily        diphenhydrAMINE-acetaminophen  MG tablet    TYLENOL PM     Take 1 tablet by mouth At Bedtime Reported on 3/20/2017        famotidine 40 MG tablet    PEPCID     Take 40 mg by mouth At Bedtime        fenofibrate 145 MG tablet     90 tablet    Take 1 tablet (145 mg) by mouth daily    CARDIOVASCULAR SCREENING; LDL GOAL LESS THAN 130       ferrous sulfate 325 (65 FE) MG tablet    IRON     Take 1 tablet by mouth daily (with breakfast).        fexofenadine 180 MG tablet    ALLEGRA    120    Take 180 mg by mouth daily.        * fluticasone 50 MCG/ACT spray    FLONASE    1 Bottle    Spray 2 sprays in nostril daily 2 sprays in each nostril qd    Seasonal allergic rhinitis due to other allergic trigger       * fluticasone 50 MCG/ACT spray    FLONASE    16 g    Spray 2 sprays into both nostrils daily    Dysfunction of right eustachian tube       gabapentin 100 MG capsule    NEURONTIN    90 capsule    Take 1 capsule (100 mg) by mouth every evening as needed    Poliomyelitis       hydrochlorothiazide 25 MG tablet    HYDRODIURIL    90  tablet    Take 1 tablet (25 mg) by mouth daily    Essential hypertension       lisinopril 20 MG tablet    PRINIVIL/ZESTRIL    90 tablet    Take 1 tablet (20 mg) by mouth daily    Essential hypertension       metFORMIN 500 MG tablet    GLUCOPHAGE     Take 500 mg by mouth daily (with dinner)        metoclopramide 10 MG tablet    REGLAN     Take 1-2 tabs as needed        multivitamin per tablet     100    ONE DAILY        nitroGLYcerin 0.4 MG sublingual tablet    NITROSTAT    25 tablet    Place 1 tablet (0.4 mg) under the tongue every 5 minutes as needed for chest pain (no more than 3 in one hour; after 3rd, call 911.)    Atypical chest pain       nystatin cream    MYCOSTATIN     Apply topically daily as needed        nystatin-triamcinolone cream    MYCOLOG II     Apply topically daily as needed        ondansetron 4 MG tablet    ZOFRAN     Take 1 tablet by mouth every 6 hours as needed Reported on 3/20/2017        * order for DME     1 Units    Equipment being ordered: Oral appliance for sleep apnea    ANNETTE (obstructive sleep apnea)       * order for DME     1 each    Donut Pillow    Coccydynia       * order for DME     2 each    Equipment being ordered: Compression stockings - Knee High; 20-30 mmHg compression    Insufficiency, arterial, peripheral (H)       pantoprazole 40 MG EC tablet    PROTONIX     Take 40 mg by mouth 2 times daily        SB LOW DOSE ASA EC 81 MG EC tablet   Generic drug:  aspirin      Take 81 mg by mouth daily        sitagliptin 50 MG tablet    JANUVIA     Take 50 mg by mouth daily        sucralfate 1 GM/10ML suspension    CARAFATE     Take 10 mLs by mouth 3 times daily as needed        SYNTHROID 112 MCG tablet   Generic drug:  levothyroxine      Take 112 mcg by mouth daily Take 112 mcg daily except for Friday take 56 mcg.  Brand name Synthroid        TOUJEO SOLOSTAR 300 UNIT/ML injection   Generic drug:  insulin glargine U-300      Inject Subcutaneous every morning        triamcinolone 0.1 %  cream    KENALOG     Apply topically daily        VITAMIN D-3 PO      Take 2 tablets by mouth        * Notice:  This list has 5 medication(s) that are the same as other medications prescribed for you. Read the directions carefully, and ask your doctor or other care provider to review them with you.

## 2017-12-10 NOTE — NURSING NOTE
"Chief Complaint   Patient presents with     Ear Problem     right ear pain and right jaw pain since Thursday.       Initial /71  Pulse 63  Temp 97.2  F (36.2  C) (Oral)  Wt 205 lb 5 oz (93.1 kg)  SpO2 97%  Breastfeeding? No  BMI 38.17 kg/m2 Estimated body mass index is 38.17 kg/(m^2) as calculated from the following:    Height as of 11/8/17: 5' 1.5\" (1.562 m).    Weight as of this encounter: 205 lb 5 oz (93.1 kg).  Medication Reconciliation: complete    "

## 2017-12-10 NOTE — PROGRESS NOTES
SUBJECTIVE:   Chasity Hodgson is a 72 year old female presenting with a chief complaint of   1) right sided jaw pain and ear pain.  Feels a noise in her ear  No trauma.    Some pain with swallowing.    NO fevers.    2) mild runny nose/congestion.    Has tried tylenol with some relief, but pain recurs.      Past Medical History:   Diagnosis Date     Abdominal adhesions 1984, 96,99    s/p lysis     Allergic rhinitis, cause unspecified      Carotid stenosis      CPAP (continuous positive airway pressure) dependence      Diabetic gastroparesis (H)      Diet-controlled type 2 diabetes mellitus (H)      DVT of axillary vein, acute right (H)      Fibromyalgia      Gastro-oesophageal reflux disease      Hernia of unspecified site of abdominal cavity without mention of obstruction or gangrene     bilateral     HTN (hypertension)      Hypertriglyceridemia      Obstructive sleep apnea      ANNETTE (obstructive sleep apnea)      Papillary carcinoma of thyroid (H)     s/p thyroidectomy - Ruegemer     PE (pulmonary embolism)      Poliomyelitis     poor circulation right leg     Postsurgical hypothyroidism     s/p papillary thryoid cancer - Ruegemer     Pulmonary embolism (H)      Rosacea      S/P carpal tunnel release     bilateral     S/P hardware removal 01/2014    still with lingering foot pain     S/P shoulder surgery     bilateral     Septic joint (H)     right knee     Venous insufficiency      Venous thrombosis 1999    right axillary vein     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)     Anemia due to blood loss, acute     Advanced directives, counseling/discussion     Postsurgical hypothyroidism     Type 2 diabetes, HbA1c goal < 7% (H)     Gastroparesis     Papillary  carcinoma of thyroid (H)     Alopecia     Pulmonary nodule     Esophageal reflux     Dermatitis     Acne rosacea     Diabetic gastroparesis (H)     Poliomyelitis     Left knee pain     Carotid stenosis     Right shoulder pain     Type 2 diabetes mellitus with other specified complication (H)     Insufficiency, arterial, peripheral (H)     Allergic dermatitis due to other chemical product     Normocytic anemia     Social History   Substance Use Topics     Smoking status: Never Smoker     Smokeless tobacco: Never Used     Alcohol use No      Comment: very rare       ROS:  CONSTITUTIONAL:NEGATIVE for fever, chills, change in weight  INTEGUMENTARY/SKIN: NEGATIVE for worrisome rashes, moles or lesions  EYES: NEGATIVE for vision changes or irritation  ENT/MOUTH: as per HPI  RESP:NEGATIVE for significant cough or SOB  CV: NEGATIVE for chest pain, palpitations or peripheral edema  GI: NEGATIVE for nausea, abdominal pain, heartburn, or change in bowel habits  MUSCULOSKELETAL: as per HPI  NEURO: NEGATIVE for weakness, dizziness or paresthesias    OBJECTIVE  :/71  Pulse 63  Temp 97.2  F (36.2  C) (Oral)  Wt 205 lb 5 oz (93.1 kg)  SpO2 97%  Breastfeeding? No  BMI 38.17 kg/m2  GENERAL APPEARANCE: healthy, alert and no distress  EYES: EOMI,  PERRL, conjunctiva clear  HENT: ear canals and TM's normal.  Nose and mouth without ulcers, erythema or lesions  HENT: nasal turbinates boggy with bluish hue and rhinorrhea clear  NECK: supple, nontender, no lymphadenopathy  RESP: lungs clear to auscultation - no rales, rhonchi or wheezes  CV: regular rates and rhythm, normal S1 S2, no murmur noted  ABDOMEN:  soft, nontender, no HSM or masses and bowel sounds normal  NEURO: Normal strength and tone, sensory exam grossly normal,  normal speech and mentation  SKIN: no suspicious lesions or rashes  JAW: non tender and without crepitus or clicking or other bony abnormality on exam.    (H69.81) Dysfunction of right eustachian tube   (primary encounter diagnosis)  Comment:   Plan: fluticasone (FLONASE) 50 MCG/ACT spray        Tylenol prn.     F/U with PCP should symptoms persist or worsen.      Patient expresses understanding and agreement with the assessment and plan as above.

## 2017-12-20 ENCOUNTER — OFFICE VISIT (OUTPATIENT)
Dept: CARDIOLOGY | Facility: CLINIC | Age: 72
End: 2017-12-20
Attending: INTERNAL MEDICINE
Payer: MEDICARE

## 2017-12-20 VITALS
HEIGHT: 62 IN | HEART RATE: 64 BPM | SYSTOLIC BLOOD PRESSURE: 135 MMHG | BODY MASS INDEX: 37.36 KG/M2 | DIASTOLIC BLOOD PRESSURE: 71 MMHG | WEIGHT: 203 LBS

## 2017-12-20 DIAGNOSIS — E78.2 MIXED HYPERLIPIDEMIA: ICD-10-CM

## 2017-12-20 DIAGNOSIS — R07.89 ATYPICAL CHEST PAIN: ICD-10-CM

## 2017-12-20 DIAGNOSIS — I10 BENIGN ESSENTIAL HYPERTENSION: Primary | ICD-10-CM

## 2017-12-20 PROCEDURE — 99214 OFFICE O/P EST MOD 30 MIN: CPT | Performed by: NURSE PRACTITIONER

## 2017-12-20 NOTE — PROGRESS NOTES
HPI and Plan:   I had the pleasure of meeting Chasity Rodriguez today in cardiology clinic follow-up.  She is a pleasant 72-year-old patient of Dr. Santana.  She has a history of atypical chest discomfort, hypertension and lower extremity edema.  She has a history of GERD, a year ago she had dilated esophageal strictures, she takes Protonix, and more recently her GI doctor switched her ranitidine to Lodine.  She has hypertension.  She has a history of polio polio and carotid disease.  She recently saw Dr. Ortiz for her atypical chest discomfort and is here today to follow-up.    In 2011 CT coronary angiography demonstrated the absence of obstructive coronary disease.  Her last nuclear stress test in 2014 showed no evidence of ischemia or infarct, she had normal LV systolic function.  She does have peripheral oral artery disease based on an evaluation done by Dr. Partida in 2016.  She wore a Holter monitor which previously demonstrated a normal sinus rhythm with average heart rate variability and isolated PVCs.    Recently Chasity saw Dr. Ortiz.  She was having an episode of atypical chest chest discomfort after a stressful event.  Dr. blanchard felt it was less likely to represent coronary ischemia given her recent stress echocardiogram where she exercised for 4 minutes and reach 71% of her age-predicted heart rate without evidence of ischemia or reproduction of chest discomfort.  For this reason she Dr. Ortiz decided to take a little bit of a watch and see approach, and if she has recurrent symptoms to try nitro to see if it alleviated them.    Today Chasity tells me she does continue to have atypical symptoms sometimes at night, sometimes when she is driving.  The symptoms she thinks occur about 4 times a week.  Unfortunately she is only had nitro with her during 1 event, she thinks it might have helped after a couple of minutes alleviate her symptoms.  When she does get sometimes they last anywhere from minutes to 20 minutes.  She  says she did have an episode on Sunday where she was standing at [employer] in line with her cane and she felt some tingling in her right hand which was holding the cane.  She then developed some tingling in her left shoulder, she put her purse down and the symptoms eventually resolved.  She said she is going to have rotator cuff surgery on February 22.    Physical exam  Please see below    Assessment and plan.  1. Atypical chest discomfort.  We talked about different ways to treat this.  She really cannot say if the nitro worked or did not work.  This certainly could be related to her GERD and esophageal strictures requiring dilatation.  We decided to continue using as needed nitro, she will keep a little journal of what her symptoms are, how often she uses nitro and its effectiveness and we will reevaluate the need for CT coronary angiogram.  If she calls in 2 weeks and says that she is having relief with nitro, then I would have my nurse order CT coronary angiogram prior to her office visit follow-up.  2. Hypertension.  Her blood pressure is borderline elevated today, it was elevated last time she was here.  She has a home blood pressure monitor.  I would like her to record her blood pressure measurements and we will check this again in a follow-up visit.    MAGALY Chowdhury, CNP      Orders Placed This Encounter   Procedures     Follow-Up with Cardiac Advanced Practice Provider       No orders of the defined types were placed in this encounter.      Medications Discontinued During This Encounter   Medication Reason     ferrous sulfate 325 (65 FE) MG tablet Alternate therapy     fluticasone (FLONASE) 50 MCG/ACT spray Medication Reconciliation Clean Up     sucralfate (CARAFATE) 1 GM/10ML suspension Alternate therapy         Encounter Diagnoses   Name Primary?     Atypical chest pain      Benign essential hypertension Yes     Mixed hyperlipidemia        CURRENT MEDICATIONS:  Current Outpatient Prescriptions    Medication Sig Dispense Refill     hydrochlorothiazide (HYDRODIURIL) 25 MG tablet Take 1 tablet (25 mg) by mouth daily 90 tablet 3     pantoprazole (PROTONIX) 40 MG EC tablet Take 40 mg by mouth 2 times daily       insulin glargine U-300 (TOUJEO SOLOSTAR) 300 UNIT/ML injection Inject Subcutaneous every morning       famotidine (PEPCID) 40 MG tablet Take 40 mg by mouth At Bedtime       Azelastine HCl (ASTEPRO NA)        triamcinolone (KENALOG) 0.1 % cream Apply topically daily       clotrimazole (LOTRIMIN) 1 % cream Apply topically daily       nitroGLYcerin (NITROSTAT) 0.4 MG sublingual tablet Place 1 tablet (0.4 mg) under the tongue every 5 minutes as needed for chest pain (no more than 3 in one hour; after 3rd, call 911.) 25 tablet 3     atenolol (TENORMIN) 50 MG tablet Take 1 tablet (50 mg) by mouth 2 times daily 180 tablet 0     lisinopril (PRINIVIL/ZESTRIL) 20 MG tablet Take 1 tablet (20 mg) by mouth daily 90 tablet 2     fenofibrate 145 MG tablet Take 1 tablet (145 mg) by mouth daily 90 tablet 2     metFORMIN (GLUCOPHAGE) 500 MG tablet Take 500 mg by mouth daily (with dinner)       fluticasone (FLONASE) 50 MCG/ACT spray Spray 2 sprays in nostril daily 2 sprays in each nostril qd 1 Bottle 0     gabapentin (NEURONTIN) 100 MG capsule Take 1 capsule (100 mg) by mouth every evening as needed 90 capsule 3     cycloSPORINE (RESTASIS) 0.05 % ophthalmic emulsion 1 drop 2 times daily       sitagliptin (JANUVIA) 50 MG tablet Take 50 mg by mouth daily       Cholecalciferol (VITAMIN D-3 PO) Take 2 tablets by mouth       BIOTIN PO Take 1,000 mcg by mouth       nystatin-triamcinolone (MYCOLOG II) cream Apply topically daily as needed       ascorbic acid (VITAMIN C) 1000 MG TABS Take 1,000 mg by mouth daily       ondansetron (ZOFRAN) 4 MG tablet Take 1 tablet by mouth every 6 hours as needed Reported on 3/20/2017       aspirin (SB LOW DOSE ASA EC) 81 MG EC tablet Take 81 mg by mouth daily       nystatin (MYCOSTATIN) cream  Apply topically daily as needed        levothyroxine (SYNTHROID) 112 MCG tablet Take 112 mcg by mouth daily Take 112 mcg daily except for Friday takes an additional 56 mcg.  Brand name Synthroid       diphenhydrAMINE-acetaminophen (TYLENOL PM)  MG tablet Take 1 tablet by mouth At Bedtime Reported on 3/20/2017       fexofenadine (ALLEGRA) 180 MG tablet Take 180 mg by mouth daily. 120 0     MULTIVITAMINS OR TABS ONE DAILY 100 3     order for DME Equipment being ordered: Compression stockings - Knee High; 20-30 mmHg compression 2 each 0     order for DME Donut Pillow 1 each 0     metoclopramide (REGLAN) 10 MG tablet Take 1-2 tabs as needed       order for DME Equipment being ordered: Oral appliance for sleep apnea 1 Units 0     blood glucose monitoring (NO BRAND SPECIFIED) test strip Use to test blood sugar 1 times daily or as directed. 1 Box 6       ALLERGIES     Allergies   Allergen Reactions     Nsaids Difficulty breathing     Increased creatinine     Toradol Difficulty breathing     Shortness of breath     Celecoxib Itching and Rash     Codeine Itching     With higher doses     No Clinical Screening - See Comments Itching     Fragrance     Vioxx Other (See Comments)     Heart races     Conjugated Estrogens Rash     Sulfa Drugs Rash       PAST MEDICAL HISTORY:  Past Medical History:   Diagnosis Date     Abdominal adhesions 1984, 96,99    s/p lysis     Allergic rhinitis, cause unspecified      Carotid stenosis      CPAP (continuous positive airway pressure) dependence      Diabetic gastroparesis (H)      Diet-controlled type 2 diabetes mellitus (H)      DVT of axillary vein, acute right (H)      Fibromyalgia      Gastro-oesophageal reflux disease      Hernia of unspecified site of abdominal cavity without mention of obstruction or gangrene     bilateral     HTN (hypertension)      Hypertriglyceridemia      Obstructive sleep apnea      ANNETTE (obstructive sleep apnea)      Papillary carcinoma of thyroid (H)     s/p  thyroidectomy - Ruegemer     PE (pulmonary embolism)      Poliomyelitis     poor circulation right leg     Postsurgical hypothyroidism     s/p papillary thryoid cancer - Ruegemer     Pulmonary embolism (H)      Rosacea      S/P carpal tunnel release     bilateral     S/P hardware removal 01/2014    still with lingering foot pain     S/P shoulder surgery     bilateral     Septic joint (H)     right knee     Venous insufficiency      Venous thrombosis 1999    right axillary vein       PAST SURGICAL HISTORY:  Past Surgical History:   Procedure Laterality Date     AMPUTATE TOE(S)  3/15/2012    Procedure:AMPUTATE TOE(S); Surgeon:SUKHDEEP METZ; Location: OR     APPENDECTOMY  1972     ARTHRODESIS FOOT  3/15/2012    Procedure:ARTHRODESIS FOOT; RIGHT TRIPLE ARTHRODESIS, FIFTH TOE AMPUTATION, LATERAL LIGAMENT RECONSTRUCTION, TENDON TRANSFER AND RELEASE [MINI C-ARM, ARTHREX 4.5 AND 6.7 STAPLES, BIOCOMPOSITE TENODESIS SCREWS]; Surgeon:SUKHDEEP METZ; Location: OR      FREEING BOWEL ADHESION,ENTEROLYSIS      1986, 1996, 1999     C NONSPECIFIC PROCEDURE      throidectomy     C TOTAL ABDOM HYSTERECTOMY  1980    + BSO     CHOLECYSTECTOMY       COLOSTOMY  2/7/2012    Procedure:COLOSTOMY; CREATION OF SIGMOID COLOSTOMY AND EXTENSIVE  LYSIS OF ADHESIONS; Surgeon:MONTSERRAT BENDER P; Location: OR     GI SURGERY      weakened rectal sphincter with artificial stimulator     LAPAROTOMY, LYSIS ADHESIONS, COMBINED  2/7/2012    Procedure:COMBINED LAPAROTOMY, LYSIS ADHESIONS; Surgeon:MONTSERRAT BENDER P; Location: OR     RELEASE TENDON FOOT  3/15/2012    Procedure:RELEASE TENDON FOOT; Surgeon:SUKHDEEP METZ; Location: OR     REMOVE HARDWARE FOOT  12/13/2012    Procedure: REMOVE HARDWARE FOOT;  RIGHT FOOT REMOVAL OF HARDWARE;  Surgeon: Sukhdeep Metz MD;  Location:  OR       FAMILY HISTORY:  Family History   Problem Relation Age of Onset     Arthritis Mother      Hypertension Mother       CEREBROVASCULAR DISEASE Mother      Obesity Mother      HEART DISEASE Mother      MI's     Hypertension Father      Respiratory Father      Adult RDS     DIABETES Father      adult     Arthritis Sister      CANCER Sister      Arthritis Sister      Hypertension Sister      CANCER Sister      colon polup     HEART DISEASE Brother      MI at 54     Hypertension Sister      Lipids Brother      Hypertension Brother      Lipids Sister      Obesity Sister      Obesity Maternal Grandmother      Skin Cancer Maternal Grandmother      skin cancer unknown     CANCER Maternal Grandmother      unknown skin cancer on face       SOCIAL HISTORY:  Social History     Social History     Marital status:      Spouse name: N/A     Number of children: N/A     Years of education: N/A     Social History Main Topics     Smoking status: Never Smoker     Smokeless tobacco: Never Used     Alcohol use No      Comment: very rare     Drug use: No     Sexual activity: Not Currently     Partners: Male     Birth control/ protection: Female Surgical     Other Topics Concern     Caffeine Concern Yes     occ     Sleep Concern Yes     Stress Concern Yes     Special Diet Yes     low carb, low sugar      Exercise No     occ. 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[employer]        Review of Systems:  Skin:  Negative       Eyes:  Positive for glasses    ENT:  Negative      Respiratory:  Negative       Cardiovascular:    Positive for (relief with the nitro tablet.)  (most episodes she did not have nitro with her.)  Gastroenterology: Positive for nausea;vomiting;reflux    Genitourinary:  not assessed      Musculoskeletal:  Positive for arthritis;joint pain knees & shoulders   Neurologic:  Negative      Psychiatric:  Negative      Heme/Lymph/Imm:  Positive for allergies    Endocrine:  Positive for thyroid disorder;diabetes      Physical Exam:  Vitals: /71 (BP  "Location: Left arm)  Pulse 64  Ht 1.562 m (5' 1.5\")  Wt 92.1 kg (203 lb)  BMI 37.74 kg/m2    Constitutional:  cooperative, alert and oriented, well developed, well nourished, in no acute distress        Skin:  warm and dry to the touch          Head:  normocephalic        Eyes:  sclera white        Lymph:      ENT:  no pallor or cyanosis        Neck:  no stiffness        Respiratory:  normal breath sounds, clear to auscultation, normal A-P diameter, normal symmetry, normal respiratory excursion, no use of accessory muscles         Cardiac: regular rhythm;normal S1 and S2                                                         GI:           Extremities and Muscular Skeletal:  no edema              Neurological:  no gross motor deficits        Psych:  Alert and Oriented x 3        CC  Andreea Ortiz MD  [id number]  [place] [id number]              "

## 2017-12-20 NOTE — PATIENT INSTRUCTIONS
Please keep the nitro with you.    For the next two weeks keep track of how often you have this discomfort and if you used nitro, and if the nitro helped.     If you see a pattern, that nitro is helping you, then call my nurse and I will order a coronary CT before our visit.    Monitor your blood pressure, write it down and bring that with you too.    Otherwise we can talk at our next visit.    Leatha [phone]

## 2017-12-20 NOTE — LETTER
12/20/2017    Shola Benoit MD, MD  [address]  [facility] [address]    RE: Chasity SU Rema       Dear Colleague,    I had the pleasure of seeing Chasity Hodgson in the [facility].    HPI and Plan:   I had the pleasure of meeting Chasity Rodriguez today in cardiology clinic follow-up.  She is a pleasant 72-year-old patient of Dr. Santana.  She has a history of atypical chest discomfort, hypertension and lower extremity edema.  She has a history of GERD, a year ago she had dilated esophageal strictures, she takes Protonix, and more recently her GI doctor switched her ranitidine to Lodine.  She has hypertension.  She has a history of polio polio and carotid disease.  She recently saw Dr. Ortiz for her atypical chest discomfort and is here today to follow-up.    In 2011 CT coronary angiography demonstrated the absence of obstructive coronary disease.  Her last nuclear stress test in 2014 showed no evidence of ischemia or infarct, she had normal LV systolic function.  She does have peripheral oral artery disease based on an evaluation done by Dr. Partida in 2016.  She wore a Holter monitor which previously demonstrated a normal sinus rhythm with average heart rate variability and isolated PVCs.    Recently Chasity saw Dr. Ortiz.  She was having an episode of atypical chest chest discomfort after a stressful event.  Dr. blanchard felt it was less likely to represent coronary ischemia given her recent stress echocardiogram where she exercised for 4 minutes and reach 71% of her age-predicted heart rate without evidence of ischemia or reproduction of chest discomfort.  For this reason she Dr. Ortiz decided to take a little bit of a watch and see approach, and if she has recurrent symptoms to try nitro to see if it alleviated them.    Today Chasity tells me she does continue to have atypical symptoms sometimes at night, sometimes when she is driving.  The symptoms she thinks occur about 4 times a  week.  Unfortunately she is only had nitro with her during 1 event, she thinks it might have helped after a couple of minutes alleviate her symptoms.  When she does get sometimes they last anywhere from minutes to 20 minutes.  She says she did have an episode on Sunday where she was standing at [employer] in line with her cane and she felt some tingling in her right hand which was holding the cane.  She then developed some tingling in her left shoulder, she put her purse down and the symptoms eventually resolved.  She said she is going to have rotator cuff surgery on February 22.    Physical exam  Please see below    Assessment and plan.  1. Atypical chest discomfort.  We talked about different ways to treat this.  She really cannot say if the nitro worked or did not work.  This certainly could be related to her GERD and esophageal strictures requiring dilatation.  We decided to continue using as needed nitro, she will keep a little journal of what her symptoms are, how often she uses nitro and its effectiveness and we will reevaluate the need for CT coronary angiogram.  If she calls in 2 weeks and says that she is having relief with nitro, then I would have my nurse order CT coronary angiogram prior to her office visit follow-up.  2. Hypertension.  Her blood pressure is borderline elevated today, it was elevated last time she was here.  She has a home blood pressure monitor.  I would like her to record her blood pressure measurements and we will check this again in a follow-up visit.    MAGALY Chowdhury, CNP      Orders Placed This Encounter   Procedures     Follow-Up with Cardiac Advanced Practice Provider       No orders of the defined types were placed in this encounter.      Medications Discontinued During This Encounter   Medication Reason     ferrous sulfate 325 (65 FE) MG tablet Alternate therapy     fluticasone (FLONASE) 50 MCG/ACT spray Medication Reconciliation Clean Up     sucralfate (CARAFATE) 1  GM/10ML suspension Alternate therapy         Encounter Diagnoses   Name Primary?     Atypical chest pain      Benign essential hypertension Yes     Mixed hyperlipidemia        CURRENT MEDICATIONS:  Current Outpatient Prescriptions   Medication Sig Dispense Refill     hydrochlorothiazide (HYDRODIURIL) 25 MG tablet Take 1 tablet (25 mg) by mouth daily 90 tablet 3     pantoprazole (PROTONIX) 40 MG EC tablet Take 40 mg by mouth 2 times daily       insulin glargine U-300 (TOUJEO SOLOSTAR) 300 UNIT/ML injection Inject Subcutaneous every morning       famotidine (PEPCID) 40 MG tablet Take 40 mg by mouth At Bedtime       Azelastine HCl (ASTEPRO NA)        triamcinolone (KENALOG) 0.1 % cream Apply topically daily       clotrimazole (LOTRIMIN) 1 % cream Apply topically daily       nitroGLYcerin (NITROSTAT) 0.4 MG sublingual tablet Place 1 tablet (0.4 mg) under the tongue every 5 minutes as needed for chest pain (no more than 3 in one hour; after 3rd, call 911.) 25 tablet 3     atenolol (TENORMIN) 50 MG tablet Take 1 tablet (50 mg) by mouth 2 times daily 180 tablet 0     lisinopril (PRINIVIL/ZESTRIL) 20 MG tablet Take 1 tablet (20 mg) by mouth daily 90 tablet 2     fenofibrate 145 MG tablet Take 1 tablet (145 mg) by mouth daily 90 tablet 2     metFORMIN (GLUCOPHAGE) 500 MG tablet Take 500 mg by mouth daily (with dinner)       fluticasone (FLONASE) 50 MCG/ACT spray Spray 2 sprays in nostril daily 2 sprays in each nostril qd 1 Bottle 0     gabapentin (NEURONTIN) 100 MG capsule Take 1 capsule (100 mg) by mouth every evening as needed 90 capsule 3     cycloSPORINE (RESTASIS) 0.05 % ophthalmic emulsion 1 drop 2 times daily       sitagliptin (JANUVIA) 50 MG tablet Take 50 mg by mouth daily       Cholecalciferol (VITAMIN D-3 PO) Take 2 tablets by mouth       BIOTIN PO Take 1,000 mcg by mouth       nystatin-triamcinolone (MYCOLOG II) cream Apply topically daily as needed       ascorbic acid (VITAMIN C) 1000 MG TABS Take 1,000 mg by  mouth daily       ondansetron (ZOFRAN) 4 MG tablet Take 1 tablet by mouth every 6 hours as needed Reported on 3/20/2017       aspirin (SB LOW DOSE ASA EC) 81 MG EC tablet Take 81 mg by mouth daily       nystatin (MYCOSTATIN) cream Apply topically daily as needed        levothyroxine (SYNTHROID) 112 MCG tablet Take 112 mcg by mouth daily Take 112 mcg daily except for Friday takes an additional 56 mcg.  Brand name Synthroid       diphenhydrAMINE-acetaminophen (TYLENOL PM)  MG tablet Take 1 tablet by mouth At Bedtime Reported on 3/20/2017       fexofenadine (ALLEGRA) 180 MG tablet Take 180 mg by mouth daily. 120 0     MULTIVITAMINS OR TABS ONE DAILY 100 3     order for DME Equipment being ordered: Compression stockings - Knee High; 20-30 mmHg compression 2 each 0     order for DME Donut Pillow 1 each 0     metoclopramide (REGLAN) 10 MG tablet Take 1-2 tabs as needed       order for DME Equipment being ordered: Oral appliance for sleep apnea 1 Units 0     blood glucose monitoring (NO BRAND SPECIFIED) test strip Use to test blood sugar 1 times daily or as directed. 1 Box 6       ALLERGIES     Allergies   Allergen Reactions     Nsaids Difficulty breathing     Increased creatinine     Toradol Difficulty breathing     Shortness of breath     Celecoxib Itching and Rash     Codeine Itching     With higher doses     No Clinical Screening - See Comments Itching     Fragrance     Vioxx Other (See Comments)     Heart races     Conjugated Estrogens Rash     Sulfa Drugs Rash       PAST MEDICAL HISTORY:  Past Medical History:   Diagnosis Date     Abdominal adhesions 1984, 96,99    s/p lysis     Allergic rhinitis, cause unspecified      Carotid stenosis      CPAP (continuous positive airway pressure) dependence      Diabetic gastroparesis (H)      Diet-controlled type 2 diabetes mellitus (H)      DVT of axillary vein, acute right (H)      Fibromyalgia      Gastro-oesophageal reflux disease      Hernia of unspecified site of  abdominal cavity without mention of obstruction or gangrene     bilateral     HTN (hypertension)      Hypertriglyceridemia      Obstructive sleep apnea      ANNETTE (obstructive sleep apnea)      Papillary carcinoma of thyroid (H)     s/p thyroidectomy - Ruegemer     PE (pulmonary embolism)      Poliomyelitis     poor circulation right leg     Postsurgical hypothyroidism     s/p papillary thryoid cancer - Ruegemer     Pulmonary embolism (H)      Rosacea      S/P carpal tunnel release     bilateral     S/P hardware removal 01/2014    still with lingering foot pain     S/P shoulder surgery     bilateral     Septic joint (H)     right knee     Venous insufficiency      Venous thrombosis 1999    right axillary vein       PAST SURGICAL HISTORY:  Past Surgical History:   Procedure Laterality Date     AMPUTATE TOE(S)  3/15/2012    Procedure:AMPUTATE TOE(S); Surgeon:RUBEN MOSES; Location: OR     APPENDECTOMY  1972     ARTHRODESIS FOOT  3/15/2012    Procedure:ARTHRODESIS FOOT; RIGHT TRIPLE ARTHRODESIS, FIFTH TOE AMPUTATION, LATERAL LIGAMENT RECONSTRUCTION, TENDON TRANSFER AND RELEASE [MINI C-ARM, ARTHREX 4.5 AND 6.7 STAPLES, BIOCOMPOSITE TENODESIS SCREWS]; Surgeon:RUBEN MOSES; Location: OR      FREEING BOWEL ADHESION,ENTEROLYSIS      1986, 1996, 1999     C NONSPECIFIC PROCEDURE      throidectomy     C TOTAL ABDOM HYSTERECTOMY  1980    + BSO     CHOLECYSTECTOMY       COLOSTOMY  2/7/2012    Procedure:COLOSTOMY; CREATION OF SIGMOID COLOSTOMY AND EXTENSIVE  LYSIS OF ADHESIONS; Surgeon:MONTSERRAT BENDER; Location: OR     GI SURGERY      weakened rectal sphincter with artificial stimulator     LAPAROTOMY, LYSIS ADHESIONS, COMBINED  2/7/2012    Procedure:COMBINED LAPAROTOMY, LYSIS ADHESIONS; Surgeon:MONTSERRAT BENDER; Location: OR     RELEASE TENDON FOOT  3/15/2012    Procedure:RELEASE TENDON FOOT; Surgeon:RUBEN MOSES; Location: OR     REMOVE HARDWARE FOOT  12/13/2012    Procedure:  REMOVE HARDWARE FOOT;  RIGHT FOOT REMOVAL OF HARDWARE;  Surgeon: Sukhdeep Metz MD;  Location:  OR       FAMILY HISTORY:  Family History   Problem Relation Age of Onset     Arthritis Mother      Hypertension Mother      CEREBROVASCULAR DISEASE Mother      Obesity Mother      HEART DISEASE Mother      MI's     Hypertension Father      Respiratory Father      Adult RDS     DIABETES Father      adult     Arthritis Sister      CANCER Sister      Arthritis Sister      Hypertension Sister      CANCER Sister      colon polup     HEART DISEASE Brother      MI at 54     Hypertension Sister      Lipids Brother      Hypertension Brother      Lipids Sister      Obesity Sister      Obesity Maternal Grandmother      Skin Cancer Maternal Grandmother      skin cancer unknown     CANCER Maternal Grandmother      unknown skin cancer on face       SOCIAL HISTORY:  Social History     Social History     Marital status:      Spouse name: N/A     Number of children: N/A     Years of education: N/A     Social History Main Topics     Smoking status: Never Smoker     Smokeless tobacco: Never Used     Alcohol use No      Comment: very rare     Drug use: No     Sexual activity: Not Currently     Partners: Male     Birth control/ protection: Female Surgical     Other Topics Concern     Caffeine Concern Yes     occ     Sleep Concern Yes     Stress Concern Yes     Special Diet Yes     low carb, low sugar      Exercise No     occ. 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[employer]        Review of Systems:  Skin:  Negative       Eyes:  Positive for glasses    ENT:  Negative      Respiratory:  Negative       Cardiovascular:    Positive for (relief with the nitro tablet.)  (most episodes she did not have nitro with her.)  Gastroenterology: Positive for nausea;vomiting;reflux    Genitourinary:  not assessed      Musculoskeletal:  Positive for  "arthritis;joint pain knees & shoulders   Neurologic:  Negative      Psychiatric:  Negative      Heme/Lymph/Imm:  Positive for allergies    Endocrine:  Positive for thyroid disorder;diabetes      Physical Exam:  Vitals: /71 (BP Location: Left arm)  Pulse 64  Ht 1.562 m (5' 1.5\")  Wt 92.1 kg (203 lb)  BMI 37.74 kg/m2    Constitutional:  cooperative, alert and oriented, well developed, well nourished, in no acute distress        Skin:  warm and dry to the touch          Head:  normocephalic        Eyes:  sclera white        Lymph:      ENT:  no pallor or cyanosis        Neck:  no stiffness        Respiratory:  normal breath sounds, clear to auscultation, normal A-P diameter, normal symmetry, normal respiratory excursion, no use of accessory muscles         Cardiac: regular rhythm;normal S1 and S2                                                         GI:           Extremities and Muscular Skeletal:  no edema              Neurological:  no gross motor deficits        Psych:  Alert and Oriented x 3        Thank you for allowing me to participate in the care of your patient.    Sincerely,     MAGALY Morgan [facility]    "

## 2017-12-20 NOTE — MR AVS SNAPSHOT
After Visit Summary   12/20/2017    Chasity Hodgson    MRN: [id number]           Patient Information     Date Of Birth          1945        Visit Information        Provider Department      12/20/2017 10:30 AM Leelee Rudolph APRN CNP [facility]   [place]        Today's Diagnoses     Benign essential hypertension    -  1    Atypical chest pain        Mixed hyperlipidemia          Care Instructions    Please keep the nitro with you.    For the next two weeks keep track of how often you have this discomfort and if you used nitro, and if the nitro helped.     If you see a pattern, that nitro is helping you, then call my nurse and I will order a coronary CT before our visit.    Monitor your blood pressure, write it down and bring that with you too.    Otherwise we can talk at our next visit.    Le[place]  [phone]            Follow-ups after your visit        Your next 10 appointments already scheduled     Dec 22, 2017 11:00 AM CST   (Arrive by 10:45 AM)   RETURN [facility]IRLOSS with Renetta Meyer MD   [facility])    [address] [place] [id number] [phone]            Mar 12, 2018 10:30 AM CDT   (Arrive by 10:15 AM)   RETURN HAIRLOSS with Renetta Meyer MD   [facility])    [address] [place], [place] [id number] [phone]              Who to contact     If you have questions or need follow up information about today's clinic visit or your schedule please contact [facility]   [place] directly at [phone].  Normal or non-critical lab and imaging results will be communicated to you by MyChart, letter or phone within 4 business days after the clinic has received the results. If you do not hear from us within 7 days, please contact the clinic through [facility] or  "phone. If you have a critical or abnormal lab result, we will notify you by phone as soon as possible.  Submit refill requests through PEPperPRINT or call your pharmacy and they will forward the refill request to us. Please allow 3 business days for your refill to be completed.          Additional Information About Your Visit        Happlinkhart Information     [employer]PRINT gives you secure access to your electronic health record. If you see a primary care provider, you can also send messages to your care team and make appointments. If you have questions, please call your primary care clinic.  If you do not have a primary care provider, please call [phone] and they will assist you.        Care EveryWhere ID     This is your Care EveryWhere ID. This could be used by other organizations to access your [place] medical records  [id number]        Your Vitals Were     Pulse Height BMI (Body Mass Index)             64 1.562 m (5' 1.5\") 37.74 kg/m2          Blood Pressure from Last 3 Encounters:   12/20/17 135/71   12/10/17 156/71   11/08/17 119/75    Weight from Last 3 Encounters:   12/20/17 92.1 kg (203 lb)   12/10/17 93.1 kg (205 lb 5 oz)   11/08/17 93.6 kg (206 lb 4.8 oz)              We Performed the Following     Follow-Up with Cardiac Advanced Practice Provider          Today's Medication Changes          These changes are accurate as of: 12/20/17 11:13 AM.  If you have any questions, ask your nurse or doctor.               Stop taking these medicines if you haven't already. Please contact your care team if you have questions.     ferrous sulfate 325 (65 FE) MG tablet   Commonly known as:  IRON   Stopped by:  Leelee Rudolph APRN CNP           sucralfate 1 GM/10ML suspension   Commonly known as:  CARAFATE   Stopped by:  Leelee Rudolph APRN CNP                    Primary Care Provider Office Phone # Fax #    Shola Benoit [phone] [address]  [facility] [address]        Equal Access to Services     JONA ROBB : Hadii domi acosta tiffanyfco Sobhavna, waaxda luqadaha, qaybta kaalmajose bowie. So [facility] [phone].    ATENCIÓN: Si habla español, tiene a modi disposición servicios gratuitos de asistencia lingüística. Kartikame al [phone].    We comply with applicable federal civil rights laws and [place] laws. We do not discriminate on the basis of race, color, national origin, age, disability, sex, sexual orientation, or gender identity.            Thank you!     Thank you for choosing [facility]  for your care. Our goal is always to provide you with excellent care. Hearing back from our patients is one way we can continue to improve our services. Please take a few minutes to complete the written survey that you may receive in the mail after your visit with us. Thank you!             Your Updated Medication List - Protect others around you: Learn how to safely use, store and throw away your medicines at www.disposemymeds.org.          This list is accurate as of: 12/20/17 11:13 AM.  Always use your most recent med list.                   Brand Name Dispense Instructions for use Diagnosis    ascorbic acid 1000 MG Tabs    vitamin C     Take 1,000 mg by mouth daily        ASTEPRO NA           atenolol 50 MG tablet    TENORMIN    180 tablet    Take 1 tablet (50 mg) by mouth 2 times daily    Essential hypertension       BIOTIN PO      Take 1,000 mcg by mouth        blood glucose monitoring test strip    no brand specified    1 Box    Use to test blood sugar 1 times daily or as directed.    Type 2 diabetes mellitus with other specified complication (H)       clotrimazole 1 % cream    LOTRIMIN     Apply topically daily        cycloSPORINE 0.05 % ophthalmic emulsion    RESTASIS     1 drop 2 times daily        diphenhydrAMINE-acetaminophen  MG tablet    TYLENOL PM     Take 1 tablet  by mouth At Bedtime Reported on 3/20/2017        famotidine 40 MG tablet    PEPCID     Take 40 mg by mouth At Bedtime        fenofibrate 145 MG tablet     90 tablet    Take 1 tablet (145 mg) by mouth daily    CARDIOVASCULAR SCREENING; LDL GOAL LESS THAN 130       fexofenadine 180 MG tablet    ALLEGRA    120    Take 180 mg by mouth daily.        fluticasone 50 MCG/ACT spray    FLONASE    1 Bottle    Spray 2 sprays in nostril daily 2 sprays in each nostril qd    Seasonal allergic rhinitis due to other allergic trigger       gabapentin 100 MG capsule    NEURONTIN    90 capsule    Take 1 capsule (100 mg) by mouth every evening as needed    Poliomyelitis       hydrochlorothiazide 25 MG tablet    HYDRODIURIL    90 tablet    Take 1 tablet (25 mg) by mouth daily    Essential hypertension       lisinopril 20 MG tablet    PRINIVIL/ZESTRIL    90 tablet    Take 1 tablet (20 mg) by mouth daily    Essential hypertension       metFORMIN 500 MG tablet    GLUCOPHAGE     Take 500 mg by mouth daily (with dinner)        metoclopramide 10 MG tablet    REGLAN     Take 1-2 tabs as needed        multivitamin per tablet     100    ONE DAILY        nitroGLYcerin 0.4 MG sublingual tablet    NITROSTAT    25 tablet    Place 1 tablet (0.4 mg) under the tongue every 5 minutes as needed for chest pain (no more than 3 in one hour; after 3rd, call 911.)    Atypical chest pain       nystatin cream    MYCOSTATIN     Apply topically daily as needed        nystatin-triamcinolone cream    MYCOLOG II     Apply topically daily as needed        ondansetron 4 MG tablet    ZOFRAN     Take 1 tablet by mouth every 6 hours as needed Reported on 3/20/2017        * order for DME     1 Units    Equipment being ordered: Oral appliance for sleep apnea    ANNETTE (obstructive sleep apnea)       * order for DME     1 each    Donut Pillow    Coccydynia       * order for DME     2 each    Equipment being ordered: Compression stockings - Knee High; 20-30 mmHg compression     Insufficiency, arterial, peripheral (H)       pantoprazole 40 MG EC tablet    PROTONIX     Take 40 mg by mouth 2 times daily        SB LOW DOSE ASA EC 81 MG EC tablet   Generic drug:  aspirin      Take 81 mg by mouth daily        sitagliptin 50 MG tablet    JANUVIA     Take 50 mg by mouth daily        SYNTHROID 112 MCG tablet   Generic drug:  levothyroxine      Take 112 mcg by mouth daily Take 112 mcg daily except for Friday takes an additional 56 mcg.  Brand name Synthroid        TOUJEO SOLOSTAR 300 UNIT/ML injection   Generic drug:  insulin glargine U-300      Inject Subcutaneous every morning        triamcinolone 0.1 % cream    KENALOG     Apply topically daily        VITAMIN D-3 PO      Take 2 tablets by mouth        * Notice:  This list has 3 medication(s) that are the same as other medications prescribed for you. Read the directions carefully, and ask your doctor or other care provider to review them with you.

## 2017-12-22 ENCOUNTER — OFFICE VISIT (OUTPATIENT)
Dept: DERMATOLOGY | Facility: CLINIC | Age: 72
End: 2017-12-22
Payer: MEDICARE

## 2017-12-22 ASSESSMENT — PAIN SCALES - GENERAL: PAINLEVEL: NO PAIN (0)

## 2017-12-22 NOTE — Clinical Note
"12/22/2017       RE: Chasity Hodgson  [address] [facility] [address]     Dear Colleague,    Thank you for referring your patient, Chasity Hodgson, to the [facility] DERMATOLOGY at [facility]. Please see a copy of my visit note below.    [facility] Dermatology Note      Dermatology Problem List:  1.***    CC:   Chief Complaint   Patient presents with     Hair Loss     Sammie is here today for a hairloss follow up- Sammie states \"my hairloss seems worse\".          Encounter Date: Dec 22, 2017    History of Present Illness:  Ms. Chasity Hodgson is a 72 year old female who {hpi:599238}     diagnosed with parkinsons since last visit    Has been well, working hard at home and work    Has question about vivascal  Trial of vivascal    White fibers in temporal scalp    Shampoos every 2-3 days, usually 3 days   Uses free & clear    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)     Anemia due to blood loss, acute     Advanced directives, counseling/discussion     Postsurgical hypothyroidism     Type 2 diabetes, HbA1c goal < 7% (H)     Gastroparesis     Papillary carcinoma of thyroid (H)     Alopecia     Pulmonary nodule     Esophageal reflux     Dermatitis     Acne rosacea     Diabetic gastroparesis (H)     Poliomyelitis     Left knee pain     Carotid stenosis     Right shoulder pain     Type 2 diabetes mellitus with other specified complication (H)     Insufficiency, arterial, peripheral (H)     Allergic dermatitis due to other chemical product     Normocytic anemia     Past Medical History:   Diagnosis Date     Abdominal adhesions 1984, 96,99    s/p lysis     Allergic rhinitis, " cause unspecified      Carotid stenosis      CPAP (continuous positive airway pressure) dependence      Diabetic gastroparesis (H)      Diet-controlled type 2 diabetes mellitus (H)      DVT of axillary vein, acute right (H)      Fibromyalgia      Gastro-oesophageal reflux disease      Hernia of unspecified site of abdominal cavity without mention of obstruction or gangrene     bilateral     HTN (hypertension)      Hypertriglyceridemia      Obstructive sleep apnea      ANNETTE (obstructive sleep apnea)      Papillary carcinoma of thyroid (H)     s/p thyroidectomy - Ruegemer     PE (pulmonary embolism)      Poliomyelitis     poor circulation right leg     Postsurgical hypothyroidism     s/p papillary thryoid cancer - Ruegemer     Pulmonary embolism (H)      Rosacea      S/P carpal tunnel release     bilateral     S/P hardware removal 01/2014    still with lingering foot pain     S/P shoulder surgery     bilateral     Septic joint (H)     right knee     Venous insufficiency      Venous thrombosis 1999    right axillary vein     Past Surgical History:   Procedure Laterality Date     AMPUTATE TOE(S)  3/15/2012    Procedure:AMPUTATE TOE(S); Surgeon:RUBEN MOSES; Location: OR     APPENDECTOMY  1972     ARTHRODESIS FOOT  3/15/2012    Procedure:ARTHRODESIS FOOT; RIGHT TRIPLE ARTHRODESIS, FIFTH TOE AMPUTATION, LATERAL LIGAMENT RECONSTRUCTION, TENDON TRANSFER AND RELEASE [MINI C-ARM, ARTHREX 4.5 AND 6.7 STAPLES, BIOCOMPOSITE TENODESIS SCREWS]; Surgeon:RUBEN MOSES; Location: OR      FREEING BOWEL ADHESION,ENTEROLYSIS      1986, 1996, 1999     C NONSPECIFIC PROCEDURE      throidectomy     C TOTAL ABDOM HYSTERECTOMY  1980    + BSO     CHOLECYSTECTOMY       COLOSTOMY  2/7/2012    Procedure:COLOSTOMY; CREATION OF SIGMOID COLOSTOMY AND EXTENSIVE  LYSIS OF ADHESIONS; Surgeon:MONTSERRAT BENDER; Location: OR     GI SURGERY      weakened rectal sphincter with artificial stimulator     LAPAROTOMY, LYSIS  ADHESIONS, COMBINED  2/7/2012    Procedure:COMBINED LAPAROTOMY, LYSIS ADHESIONS; Surgeon:MONTSERRAT BENDER; Location:[facility] OR     RELEASE TENDON FOOT  3/15/2012    Procedure:RELEASE TENDON FOOT; Surgeon:SUKHDEEP METZ; Location:[facility] OR     REMOVE HARDWARE FOOT  12/13/2012    Procedure: REMOVE HARDWARE FOOT;  RIGHT FOOT REMOVAL OF HARDWARE;  Surgeon: Sukhdeep Metz MD;  Location:  OR       Social History:  The patient {works:581451}. The patient {denies/admits to:968071} use of tanning beds.    Family History:  {Familyhxderm:782585}    Medications:  Current Outpatient Prescriptions   Medication Sig Dispense Refill     hydrochlorothiazide (HYDRODIURIL) 25 MG tablet Take 1 tablet (25 mg) by mouth daily 90 tablet 3     pantoprazole (PROTONIX) 40 MG EC tablet Take 40 mg by mouth 2 times daily       insulin glargine U-300 (TOUJEO SOLOSTAR) 300 UNIT/ML injection Inject Subcutaneous every morning       famotidine (PEPCID) 40 MG tablet Take 40 mg by mouth At Bedtime       Azelastine HCl (ASTEPRO NA)        triamcinolone (KENALOG) 0.1 % cream Apply topically daily       clotrimazole (LOTRIMIN) 1 % cream Apply topically daily       nitroGLYcerin (NITROSTAT) 0.4 MG sublingual tablet Place 1 tablet (0.4 mg) under the tongue every 5 minutes as needed for chest pain (no more than 3 in one hour; after 3rd, call 911.) 25 tablet 3     atenolol (TENORMIN) 50 MG tablet Take 1 tablet (50 mg) by mouth 2 times daily 180 tablet 0     lisinopril (PRINIVIL/ZESTRIL) 20 MG tablet Take 1 tablet (20 mg) by mouth daily 90 tablet 2     fenofibrate 145 MG tablet Take 1 tablet (145 mg) by mouth daily 90 tablet 2     order for DME Equipment being ordered: Compression stockings - Knee High; 20-30 mmHg compression 2 each 0     order for DME Donut Pillow 1 each 0     metFORMIN (GLUCOPHAGE) 500 MG tablet Take 500 mg by mouth daily (with dinner)       metoclopramide (REGLAN) 10 MG tablet Take 1-2 tabs as needed       fluticasone  (FLONASE) 50 MCG/ACT spray Spray 2 sprays in nostril daily 2 sprays in each nostril qd 1 Bottle 0     gabapentin (NEURONTIN) 100 MG capsule Take 1 capsule (100 mg) by mouth every evening as needed 90 capsule 3     order for DME Equipment being ordered: Oral appliance for sleep apnea 1 Units 0     cycloSPORINE (RESTASIS) 0.05 % ophthalmic emulsion 1 drop 2 times daily       sitagliptin (JANUVIA) 50 MG tablet Take 50 mg by mouth daily       Cholecalciferol (VITAMIN D-3 PO) Take 2 tablets by mouth       BIOTIN PO Take 1,000 mcg by mouth       blood glucose monitoring (NO BRAND SPECIFIED) test strip Use to test blood sugar 1 times daily or as directed. 1 Box 6     nystatin-triamcinolone (MYCOLOG II) cream Apply topically daily as needed       ascorbic acid (VITAMIN C) 1000 MG TABS Take 1,000 mg by mouth daily       ondansetron (ZOFRAN) 4 MG tablet Take 1 tablet by mouth every 6 hours as needed Reported on 3/20/2017       aspirin (SB LOW DOSE ASA EC) 81 MG EC tablet Take 81 mg by mouth daily       nystatin (MYCOSTATIN) cream Apply topically daily as needed        levothyroxine (SYNTHROID) 112 MCG tablet Take 112 mcg by mouth daily Take 112 mcg daily except for Friday takes an additional 56 mcg.  Brand name Synthroid       diphenhydrAMINE-acetaminophen (TYLENOL PM)  MG tablet Take 1 tablet by mouth At Bedtime Reported on 3/20/2017       fexofenadine (ALLEGRA) 180 MG tablet Take 180 mg by mouth daily. 120 0     MULTIVITAMINS OR TABS ONE DAILY 100 3     Allergies   Allergen Reactions     Nsaids Difficulty breathing     Increased creatinine     Toradol Difficulty breathing     Shortness of breath     Celecoxib Itching and Rash     Codeine Itching     With higher doses     No Clinical Screening - See Comments Itching     Fragrance     Vioxx Other (See Comments)     Heart races     Conjugated Estrogens Rash     Sulfa Drugs Rash         Review of Systems:  -{ROS:754862}  -Constitutional: The patient denies fatigue,  fevers, chills, unintended weight loss, and night sweats.  -HEENT: Patient denies nonhealing oral sores.  -Skin: As above in HPI. No additional skin concerns.    Physical exam:  Vitals: There were no vitals taken for this visit.  GEN:      GEN: This is a well developed, well-nourished female in no acute distress, in a pleasant mood.    SKIN: Focused examination of the face, scalp and fingernails was performed.  --midline part width 1.2, Savin 3  -regrowth layers:                        1st layer: 1-2 cm fibers                        2nd layer: 4-5 cm fibers with evidence of hair product present at the end of the                                      hair fibers                        3rd layer: cut layer   -decreased hair fiber density along medial aspect of bilateral eyebrows  -no hair loss present on eyelashes   {RFGeneralAppearance:807797}   SKIN: {Skin Exam:998436}  -{Skin Exam Derm:344605}  -{Skin Exam Derm:540406}  -{Skin Exam Derm:022739}  -No other lesions of concern on areas examined.     Impression/Plan:  1. PRP Study  2.   3. {Diagnosesderm:824433}    {rfplan:494160}    ***    4. {Diagnosesderm:311672}    {rfplan:054203}    ***    5. {Diagnosesderm:550171}    {rfplan:527294}    ***    6. {Diagnosesderm:461038}    {rfplan:598407}    ***    CC  *** on close of this encounter.  Follow-up {rfmultiple:904306} {follow up in days/weeks/months:604717}.       Staff Involved:  Staff Only                                                Again, thank you for allowing me to participate in the care of your patient.      Sincerely,    Renetta Meyer MD

## 2017-12-22 NOTE — LETTER
"12/22/2017       RE: Chasity Hodgson  [address] [facility] [address]     Dear Colleague,    Thank you for referring your patient, Chasity Hodgson, to the [facility] DERMATOLOGY at [facility]. Please see a copy of my visit note below.    [facility] Dermatology Note      Dermatology Problem List:  1.***    CC:   Chief Complaint   Patient presents with     Hair Loss     Sammie is here today for a hairloss follow up- Sammie states \"my hairloss seems worse\".          Encounter Date: Dec 22, 2017    History of Present Illness:  Ms. Chasity Hodgson is a 72 year old female who {hpi:148526}     diagnosed with parkinsons since last visit    Has been well, working hard at home and work    Has question about vivascal  Trial of vivascal    White fibers in temporal scalp    Shampoos every 2-3 days, usually 3 days   Uses free & clear    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)     Anemia due to blood loss, acute     Advanced directives, counseling/discussion     Postsurgical hypothyroidism     Type 2 diabetes, HbA1c goal < 7% (H)     Gastroparesis     Papillary carcinoma of thyroid (H)     Alopecia     Pulmonary nodule     Esophageal reflux     Dermatitis     Acne rosacea     Diabetic gastroparesis (H)     Poliomyelitis     Left knee pain     Carotid stenosis     Right shoulder pain     Type 2 diabetes mellitus with other specified complication (H)     Insufficiency, arterial, peripheral (H)     Allergic dermatitis due to other chemical product     Normocytic anemia     Past Medical History:   Diagnosis Date     Abdominal adhesions 1984, 96,99    s/p lysis     Allergic rhinitis, " cause unspecified      Carotid stenosis      CPAP (continuous positive airway pressure) dependence      Diabetic gastroparesis (H)      Diet-controlled type 2 diabetes mellitus (H)      DVT of axillary vein, acute right (H)      Fibromyalgia      Gastro-oesophageal reflux disease      Hernia of unspecified site of abdominal cavity without mention of obstruction or gangrene     bilateral     HTN (hypertension)      Hypertriglyceridemia      Obstructive sleep apnea      ANNETTE (obstructive sleep apnea)      Papillary carcinoma of thyroid (H)     s/p thyroidectomy - Ruegemer     PE (pulmonary embolism)      Poliomyelitis     poor circulation right leg     Postsurgical hypothyroidism     s/p papillary thryoid cancer - Ruegemer     Pulmonary embolism (H)      Rosacea      S/P carpal tunnel release     bilateral     S/P hardware removal 01/2014    still with lingering foot pain     S/P shoulder surgery     bilateral     Septic joint (H)     right knee     Venous insufficiency      Venous thrombosis 1999    right axillary vein     Past Surgical History:   Procedure Laterality Date     AMPUTATE TOE(S)  3/15/2012    Procedure:AMPUTATE TOE(S); Surgeon:RUBEN MOSES; Location: OR     APPENDECTOMY  1972     ARTHRODESIS FOOT  3/15/2012    Procedure:ARTHRODESIS FOOT; RIGHT TRIPLE ARTHRODESIS, FIFTH TOE AMPUTATION, LATERAL LIGAMENT RECONSTRUCTION, TENDON TRANSFER AND RELEASE [MINI C-ARM, ARTHREX 4.5 AND 6.7 STAPLES, BIOCOMPOSITE TENODESIS SCREWS]; Surgeon:RUBEN MOSES; Location: OR      FREEING BOWEL ADHESION,ENTEROLYSIS      1986, 1996, 1999     C NONSPECIFIC PROCEDURE      throidectomy     C TOTAL ABDOM HYSTERECTOMY  1980    + BSO     CHOLECYSTECTOMY       COLOSTOMY  2/7/2012    Procedure:COLOSTOMY; CREATION OF SIGMOID COLOSTOMY AND EXTENSIVE  LYSIS OF ADHESIONS; Surgeon:MONTSERRAT BENDER; Location: OR     GI SURGERY      weakened rectal sphincter with artificial stimulator     LAPAROTOMY, LYSIS  ADHESIONS, COMBINED  2/7/2012    Procedure:COMBINED LAPAROTOMY, LYSIS ADHESIONS; Surgeon:MONTSERRAT BENDER; Location:[facility]     RELEASE TENDON FOOT  3/15/2012    Procedure:RELEASE TENDON FOOT; Surgeon:SUKHDEEP METZ; Location:[facility]     REMOVE HARDWARE FOOT  12/13/2012    Procedure: REMOVE HARDWARE FOOT;  RIGHT FOOT REMOVAL OF HARDWARE;  Surgeon: Sukhdeep Metz MD;  Location:  OR       Social History:  The patient {works:954856}. The patient {denies/admits to:835587} use of tanning beds.    Family History:  {Familyhxderm:686254}    Medications:  Current Outpatient Prescriptions   Medication Sig Dispense Refill     hydrochlorothiazide (HYDRODIURIL) 25 MG tablet Take 1 tablet (25 mg) by mouth daily 90 tablet 3     pantoprazole (PROTONIX) 40 MG EC tablet Take 40 mg by mouth 2 times daily       insulin glargine U-300 (TOUJEO SOLOSTAR) 300 UNIT/ML injection Inject Subcutaneous every morning       famotidine (PEPCID) 40 MG tablet Take 40 mg by mouth At Bedtime       Azelastine HCl (ASTEPRO NA)        triamcinolone (KENALOG) 0.1 % cream Apply topically daily       clotrimazole (LOTRIMIN) 1 % cream Apply topically daily       nitroGLYcerin (NITROSTAT) 0.4 MG sublingual tablet Place 1 tablet (0.4 mg) under the tongue every 5 minutes as needed for chest pain (no more than 3 in one hour; after 3rd, call 911.) 25 tablet 3     atenolol (TENORMIN) 50 MG tablet Take 1 tablet (50 mg) by mouth 2 times daily 180 tablet 0     lisinopril (PRINIVIL/ZESTRIL) 20 MG tablet Take 1 tablet (20 mg) by mouth daily 90 tablet 2     fenofibrate 145 MG tablet Take 1 tablet (145 mg) by mouth daily 90 tablet 2     order for DME Equipment being ordered: Compression stockings - Knee High; 20-30 mmHg compression 2 each 0     order for DME Donut Pillow 1 each 0     metFORMIN (GLUCOPHAGE) 500 MG tablet Take 500 mg by mouth daily (with dinner)       metoclopramide (REGLAN) 10 MG tablet Take 1-2 tabs as needed       fluticasone  (FLONASE) 50 MCG/ACT spray Spray 2 sprays in nostril daily 2 sprays in each nostril qd 1 Bottle 0     gabapentin (NEURONTIN) 100 MG capsule Take 1 capsule (100 mg) by mouth every evening as needed 90 capsule 3     order for DME Equipment being ordered: Oral appliance for sleep apnea 1 Units 0     cycloSPORINE (RESTASIS) 0.05 % ophthalmic emulsion 1 drop 2 times daily       sitagliptin (JANUVIA) 50 MG tablet Take 50 mg by mouth daily       Cholecalciferol (VITAMIN D-3 PO) Take 2 tablets by mouth       BIOTIN PO Take 1,000 mcg by mouth       blood glucose monitoring (NO BRAND SPECIFIED) test strip Use to test blood sugar 1 times daily or as directed. 1 Box 6     nystatin-triamcinolone (MYCOLOG II) cream Apply topically daily as needed       ascorbic acid (VITAMIN C) 1000 MG TABS Take 1,000 mg by mouth daily       ondansetron (ZOFRAN) 4 MG tablet Take 1 tablet by mouth every 6 hours as needed Reported on 3/20/2017       aspirin (SB LOW DOSE ASA EC) 81 MG EC tablet Take 81 mg by mouth daily       nystatin (MYCOSTATIN) cream Apply topically daily as needed        levothyroxine (SYNTHROID) 112 MCG tablet Take 112 mcg by mouth daily Take 112 mcg daily except for Friday takes an additional 56 mcg.  Brand name Synthroid       diphenhydrAMINE-acetaminophen (TYLENOL PM)  MG tablet Take 1 tablet by mouth At Bedtime Reported on 3/20/2017       fexofenadine (ALLEGRA) 180 MG tablet Take 180 mg by mouth daily. 120 0     MULTIVITAMINS OR TABS ONE DAILY 100 3     Allergies   Allergen Reactions     Nsaids Difficulty breathing     Increased creatinine     Toradol Difficulty breathing     Shortness of breath     Celecoxib Itching and Rash     Codeine Itching     With higher doses     No Clinical Screening - See Comments Itching     Fragrance     Vioxx Other (See Comments)     Heart races     Conjugated Estrogens Rash     Sulfa Drugs Rash         Review of Systems:  -{ROS:319554}  -Constitutional: The patient denies fatigue,  fevers, chills, unintended weight loss, and night sweats.  -HEENT: Patient denies nonhealing oral sores.  -Skin: As above in HPI. No additional skin concerns.    Physical exam:  Vitals: There were no vitals taken for this visit.  GEN:      GEN: This is a well developed, well-nourished female in no acute distress, in a pleasant mood.    SKIN: Focused examination of the face, scalp and fingernails was performed.  --midline part width 1.2, Savin 3  -regrowth layers:                        1st layer: 1-2 cm fibers                        2nd layer: 4-5 cm fibers with evidence of hair product present at the end of the                                      hair fibers                        3rd layer: cut layer   -decreased hair fiber density along medial aspect of bilateral eyebrows  -no hair loss present on eyelashes   {RFGeneralAppearance:486280}   SKIN: {Skin Exam:479219}  -{Skin Exam Derm:744353}  -{Skin Exam Derm:226317}  -{Skin Exam Derm:404255}  -No other lesions of concern on areas examined.     Impression/Plan:  1. PRP Study  2.   3. {Diagnosesderm:109100}    {rfplan:206557}    ***    4. {Diagnosesderm:455397}    {rfplan:185848}    ***    5. {Diagnosesderm:570005}    {rfplan:238783}    ***    6. {Diagnosesderm:437206}    {rfplan:652786}    ***    CC  *** on close of this encounter.  Follow-up {rfmultiple:726948} {follow up in days/weeks/months:164123}.       Staff Involved:  Staff Only                                                Again, thank you for allowing me to participate in the care of your patient.      Sincerely,    Renetta Meyer MD

## 2017-12-22 NOTE — PROGRESS NOTES
"[facility] Dermatology Note      Dermatology Problem List:  1.***    CC:   Chief Complaint   Patient presents with     Hair Loss     Sammie is here today for a hairloss follow up- Sammie states \"my hairloss seems worse\".          Encounter Date: Dec 22, 2017    History of Present Illness:  Ms. Chasity Hodgson is a 72 year old female who {hpi:339935}     diagnosed with parkinsons since last visit    Has been well, working hard at home and work    Has question about vivascal  Trial of vivascal    White fibers in temporal scalp    Shampoos every 2-3 days, usually 3 days   Uses free & clear    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)     Anemia due to blood loss, acute     Advanced directives, counseling/discussion     Postsurgical hypothyroidism     Type 2 diabetes, HbA1c goal < 7% (H)     Gastroparesis     Papillary carcinoma of thyroid (H)     Alopecia     Pulmonary nodule     Esophageal reflux     Dermatitis     Acne rosacea     Diabetic gastroparesis (H)     Poliomyelitis     Left knee pain     Carotid stenosis     Right shoulder pain     Type 2 diabetes mellitus with other specified complication (H)     Insufficiency, arterial, peripheral (H)     Allergic dermatitis due to other chemical product     Normocytic anemia     Past Medical History:   Diagnosis Date     Abdominal adhesions 1984, 96,99    s/p lysis     Allergic rhinitis, cause unspecified      Carotid stenosis      CPAP (continuous positive airway pressure) dependence      Diabetic gastroparesis (H)      Diet-controlled type 2 diabetes mellitus (H)      DVT of axillary vein, acute right (H)      Fibromyalgia      Gastro-oesophageal reflux disease  "     Hernia of unspecified site of abdominal cavity without mention of obstruction or gangrene     bilateral     HTN (hypertension)      Hypertriglyceridemia      Obstructive sleep apnea      ANNETTE (obstructive sleep apnea)      Papillary carcinoma of thyroid (H)     s/p thyroidectomy - Ruegemer     PE (pulmonary embolism)      Poliomyelitis     poor circulation right leg     Postsurgical hypothyroidism     s/p papillary thryoid cancer - Ruegemer     Pulmonary embolism (H)      Rosacea      S/P carpal tunnel release     bilateral     S/P hardware removal 01/2014    still with lingering foot pain     S/P shoulder surgery     bilateral     Septic joint (H)     right knee     Venous insufficiency      Venous thrombosis 1999    right axillary vein     Past Surgical History:   Procedure Laterality Date     AMPUTATE TOE(S)  3/15/2012    Procedure:AMPUTATE TOE(S); Surgeon:RUBEN MOSES; Location: OR     APPENDECTOMY  1972     ARTHRODESIS FOOT  3/15/2012    Procedure:ARTHRODESIS FOOT; RIGHT TRIPLE ARTHRODESIS, FIFTH TOE AMPUTATION, LATERAL LIGAMENT RECONSTRUCTION, TENDON TRANSFER AND RELEASE [MINI C-ARM, ARTHREX 4.5 AND 6.7 STAPLES, BIOCOMPOSITE TENODESIS SCREWS]; Surgeon:RUBEN MOSES; Location: OR      FREEING BOWEL ADHESION,ENTEROLYSIS      1986, 1996, 1999     C NONSPECIFIC PROCEDURE      throidectomy     C TOTAL ABDOM HYSTERECTOMY  1980    + BSO     CHOLECYSTECTOMY       COLOSTOMY  2/7/2012    Procedure:COLOSTOMY; CREATION OF SIGMOID COLOSTOMY AND EXTENSIVE  LYSIS OF ADHESIONS; Surgeon:MONTSERRAT BENDER; Location: OR     GI SURGERY      weakened rectal sphincter with artificial stimulator     LAPAROTOMY, LYSIS ADHESIONS, COMBINED  2/7/2012    Procedure:COMBINED LAPAROTOMY, LYSIS ADHESIONS; Surgeon:MONTSERRAT BENDER; Location: OR     RELEASE TENDON FOOT  3/15/2012    Procedure:RELEASE TENDON FOOT; Surgeon:RUBEN MOSES; Location: OR     REMOVE HARDWARE FOOT  12/13/2012     Procedure: REMOVE HARDWARE FOOT;  RIGHT FOOT REMOVAL OF HARDWARE;  Surgeon: Sukhdeep Metz MD;  Location:  OR       Social History:  The patient {works:897148}. The patient {denies/admits to:877772} use of tanning beds.    Family History:  {Familyhxderm:872221}    Medications:  Current Outpatient Prescriptions   Medication Sig Dispense Refill     hydrochlorothiazide (HYDRODIURIL) 25 MG tablet Take 1 tablet (25 mg) by mouth daily 90 tablet 3     pantoprazole (PROTONIX) 40 MG EC tablet Take 40 mg by mouth 2 times daily       insulin glargine U-300 (TOUJEO SOLOSTAR) 300 UNIT/ML injection Inject Subcutaneous every morning       famotidine (PEPCID) 40 MG tablet Take 40 mg by mouth At Bedtime       Azelastine HCl (ASTEPRO NA)        triamcinolone (KENALOG) 0.1 % cream Apply topically daily       clotrimazole (LOTRIMIN) 1 % cream Apply topically daily       nitroGLYcerin (NITROSTAT) 0.4 MG sublingual tablet Place 1 tablet (0.4 mg) under the tongue every 5 minutes as needed for chest pain (no more than 3 in one hour; after 3rd, call 911.) 25 tablet 3     atenolol (TENORMIN) 50 MG tablet Take 1 tablet (50 mg) by mouth 2 times daily 180 tablet 0     lisinopril (PRINIVIL/ZESTRIL) 20 MG tablet Take 1 tablet (20 mg) by mouth daily 90 tablet 2     fenofibrate 145 MG tablet Take 1 tablet (145 mg) by mouth daily 90 tablet 2     order for DME Equipment being ordered: Compression stockings - Knee High; 20-30 mmHg compression 2 each 0     order for DME Donut Pillow 1 each 0     metFORMIN (GLUCOPHAGE) 500 MG tablet Take 500 mg by mouth daily (with dinner)       metoclopramide (REGLAN) 10 MG tablet Take 1-2 tabs as needed       fluticasone (FLONASE) 50 MCG/ACT spray Spray 2 sprays in nostril daily 2 sprays in each nostril qd 1 Bottle 0     gabapentin (NEURONTIN) 100 MG capsule Take 1 capsule (100 mg) by mouth every evening as needed 90 capsule 3     order for DME Equipment being ordered: Oral appliance for sleep apnea 1  Units 0     cycloSPORINE (RESTASIS) 0.05 % ophthalmic emulsion 1 drop 2 times daily       sitagliptin (JANUVIA) 50 MG tablet Take 50 mg by mouth daily       Cholecalciferol (VITAMIN D-3 PO) Take 2 tablets by mouth       BIOTIN PO Take 1,000 mcg by mouth       blood glucose monitoring (NO BRAND SPECIFIED) test strip Use to test blood sugar 1 times daily or as directed. 1 Box 6     nystatin-triamcinolone (MYCOLOG II) cream Apply topically daily as needed       ascorbic acid (VITAMIN C) 1000 MG TABS Take 1,000 mg by mouth daily       ondansetron (ZOFRAN) 4 MG tablet Take 1 tablet by mouth every 6 hours as needed Reported on 3/20/2017       aspirin (SB LOW DOSE ASA EC) 81 MG EC tablet Take 81 mg by mouth daily       nystatin (MYCOSTATIN) cream Apply topically daily as needed        levothyroxine (SYNTHROID) 112 MCG tablet Take 112 mcg by mouth daily Take 112 mcg daily except for Friday takes an additional 56 mcg.  Brand name Synthroid       diphenhydrAMINE-acetaminophen (TYLENOL PM)  MG tablet Take 1 tablet by mouth At Bedtime Reported on 3/20/2017       fexofenadine (ALLEGRA) 180 MG tablet Take 180 mg by mouth daily. 120 0     MULTIVITAMINS OR TABS ONE DAILY 100 3     Allergies   Allergen Reactions     Nsaids Difficulty breathing     Increased creatinine     Toradol Difficulty breathing     Shortness of breath     Celecoxib Itching and Rash     Codeine Itching     With higher doses     No Clinical Screening - See Comments Itching     Fragrance     Vioxx Other (See Comments)     Heart races     Conjugated Estrogens Rash     Sulfa Drugs Rash         Review of Systems:  -{ROS:771298}  -Constitutional: The patient denies fatigue, fevers, chills, unintended weight loss, and night sweats.  -HEENT: Patient denies nonhealing oral sores.  -Skin: As above in HPI. No additional skin concerns.    Physical exam:  Vitals: There were no vitals taken for this visit.  GEN:      GEN: This is a well developed, well-nourished female  in no acute distress, in a pleasant mood.    SKIN: Focused examination of the face, scalp and fingernails was performed.  --midline part width 1.2, Savin 3  -regrowth layers:                        1st layer: 1-2 cm fibers                        2nd layer: 4-5 cm fibers with evidence of hair product present at the end of the                                      hair fibers                        3rd layer: cut layer   -decreased hair fiber density along medial aspect of bilateral eyebrows  -no hair loss present on eyelashes   {RFGeneralAppearance:222446}   SKIN: {Skin Exam:570884}  -{Skin Exam Derm:240491}  -{Skin Exam Derm:551288}  -{Skin Exam Derm:231493}  -No other lesions of concern on areas examined.     Impression/Plan:  1. PRP Study  2.   3. {Diagnosesderm:755152}    {rfplan:265295}    ***    4. {Diagnosesderm:719145}    {rfplan:808534}    ***    5. {Diagnosesderm:913686}    {rfplan:774884}    ***    6. {Diagnosesderm:912324}    {rfplan:893384}    ***    CC  *** on close of this encounter.  Follow-up {rfmultiple:614405} {follow up in days/weeks/months:066014}.       Staff Involved:  Staff Only

## 2017-12-22 NOTE — LETTER
"12/22/2017       RE: Chasity Hodgson  [address] [address] [id number]       [facility] Dermatology Note      Dermatology Problem List:  1.***    CC:   Chief Complaint   Patient presents with     Hair Loss     Sammie is here today for a hairloss follow up- Sammie states \"my hairloss seems worse\".          Encounter Date: Dec 22, 2017    History of Present Illness:  Ms. Chasity Hodgson is a 72 year old female who {hpi:160106}     diagnosed with parkinsons since last visit    Has been well, working hard at home and work    Has question about vivascal  Trial of vivascal    White fibers in temporal scalp    Shampoos every 2-3 days, usually 3 days   Uses free & clear    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)     Anemia due to blood loss, acute     Advanced directives, counseling/discussion     Postsurgical hypothyroidism     Type 2 diabetes, HbA1c goal < 7% (H)     Gastroparesis     Papillary carcinoma of thyroid (H)     Alopecia     Pulmonary nodule     Esophageal reflux     Dermatitis     Acne rosacea     Diabetic gastroparesis (H)     Poliomyelitis     Left knee pain     Carotid stenosis     Right shoulder pain     Type 2 diabetes mellitus with other specified complication (H)     Insufficiency, arterial, peripheral (H)     Allergic dermatitis due to other chemical product     Normocytic anemia     Past Medical History:   Diagnosis Date     Abdominal adhesions 1984, 96,99    s/p lysis     Allergic rhinitis, cause unspecified      Carotid stenosis      CPAP (continuous positive airway pressure) dependence      Diabetic gastroparesis (H)      Diet-controlled type 2 diabetes mellitus (H)      DVT of " axillary vein, acute right (H)      Fibromyalgia      Gastro-oesophageal reflux disease      Hernia of unspecified site of abdominal cavity without mention of obstruction or gangrene     bilateral     HTN (hypertension)      Hypertriglyceridemia      Obstructive sleep apnea      ANNETTE (obstructive sleep apnea)      Papillary carcinoma of thyroid (H)     s/p thyroidectomy - Ruegemer     PE (pulmonary embolism)      Poliomyelitis     poor circulation right leg     Postsurgical hypothyroidism     s/p papillary thryoid cancer - Ruegemer     Pulmonary embolism (H)      Rosacea      S/P carpal tunnel release     bilateral     S/P hardware removal 01/2014    still with lingering foot pain     S/P shoulder surgery     bilateral     Septic joint (H)     right knee     Venous insufficiency      Venous thrombosis 1999    right axillary vein     Past Surgical History:   Procedure Laterality Date     AMPUTATE TOE(S)  3/15/2012    Procedure:AMPUTATE TOE(S); Surgeon:RUBEN MOSES; Location: OR     APPENDECTOMY  1972     ARTHRODESIS FOOT  3/15/2012    Procedure:ARTHRODESIS FOOT; RIGHT TRIPLE ARTHRODESIS, FIFTH TOE AMPUTATION, LATERAL LIGAMENT RECONSTRUCTION, TENDON TRANSFER AND RELEASE [MINI C-ARM, ARTHREX 4.5 AND 6.7 STAPLES, BIOCOMPOSITE TENODESIS SCREWS]; Surgeon:RUBEN MOSES; Location: OR      FREEING BOWEL ADHESION,ENTEROLYSIS      1986, 1996, 1999     C NONSPECIFIC PROCEDURE      throidectomy     C TOTAL ABDOM HYSTERECTOMY  1980    + BSO     CHOLECYSTECTOMY       COLOSTOMY  2/7/2012    Procedure:COLOSTOMY; CREATION OF SIGMOID COLOSTOMY AND EXTENSIVE  LYSIS OF ADHESIONS; Surgeon:MONTSERRAT BENDER P; Location: OR     GI SURGERY      weakened rectal sphincter with artificial stimulator     LAPAROTOMY, LYSIS ADHESIONS, COMBINED  2/7/2012    Procedure:COMBINED LAPAROTOMY, LYSIS ADHESIONS; Surgeon:MONTSERRAT BENDER P; Location: OR     RELEASE TENDON FOOT  3/15/2012    Procedure:RELEASE TENDON FOOT;  Surgeon:SUKHDEEP METZ; Location: OR     REMOVE HARDWARE FOOT  12/13/2012    Procedure: REMOVE HARDWARE FOOT;  RIGHT FOOT REMOVAL OF HARDWARE;  Surgeon: Sukhdeep Metz MD;  Location:  OR       Social History:  The patient {works:023100}. The patient {denies/admits to:211582} use of tanning beds.    Family History:  {Familyhxderm:400835}    Medications:  Current Outpatient Prescriptions   Medication Sig Dispense Refill     hydrochlorothiazide (HYDRODIURIL) 25 MG tablet Take 1 tablet (25 mg) by mouth daily 90 tablet 3     pantoprazole (PROTONIX) 40 MG EC tablet Take 40 mg by mouth 2 times daily       insulin glargine U-300 (TOUJEO SOLOSTAR) 300 UNIT/ML injection Inject Subcutaneous every morning       famotidine (PEPCID) 40 MG tablet Take 40 mg by mouth At Bedtime       Azelastine HCl (ASTEPRO NA)        triamcinolone (KENALOG) 0.1 % cream Apply topically daily       clotrimazole (LOTRIMIN) 1 % cream Apply topically daily       nitroGLYcerin (NITROSTAT) 0.4 MG sublingual tablet Place 1 tablet (0.4 mg) under the tongue every 5 minutes as needed for chest pain (no more than 3 in one hour; after 3rd, call 911.) 25 tablet 3     atenolol (TENORMIN) 50 MG tablet Take 1 tablet (50 mg) by mouth 2 times daily 180 tablet 0     lisinopril (PRINIVIL/ZESTRIL) 20 MG tablet Take 1 tablet (20 mg) by mouth daily 90 tablet 2     fenofibrate 145 MG tablet Take 1 tablet (145 mg) by mouth daily 90 tablet 2     order for DME Equipment being ordered: Compression stockings - Knee High; 20-30 mmHg compression 2 each 0     order for DME Donut Pillow 1 each 0     metFORMIN (GLUCOPHAGE) 500 MG tablet Take 500 mg by mouth daily (with dinner)       metoclopramide (REGLAN) 10 MG tablet Take 1-2 tabs as needed       fluticasone (FLONASE) 50 MCG/ACT spray Spray 2 sprays in nostril daily 2 sprays in each nostril qd 1 Bottle 0     gabapentin (NEURONTIN) 100 MG capsule Take 1 capsule (100 mg) by mouth every evening as needed 90  capsule 3     order for DME Equipment being ordered: Oral appliance for sleep apnea 1 Units 0     cycloSPORINE (RESTASIS) 0.05 % ophthalmic emulsion 1 drop 2 times daily       sitagliptin (JANUVIA) 50 MG tablet Take 50 mg by mouth daily       Cholecalciferol (VITAMIN D-3 PO) Take 2 tablets by mouth       BIOTIN PO Take 1,000 mcg by mouth       blood glucose monitoring (NO BRAND SPECIFIED) test strip Use to test blood sugar 1 times daily or as directed. 1 Box 6     nystatin-triamcinolone (MYCOLOG II) cream Apply topically daily as needed       ascorbic acid (VITAMIN C) 1000 MG TABS Take 1,000 mg by mouth daily       ondansetron (ZOFRAN) 4 MG tablet Take 1 tablet by mouth every 6 hours as needed Reported on 3/20/2017       aspirin (SB LOW DOSE ASA EC) 81 MG EC tablet Take 81 mg by mouth daily       nystatin (MYCOSTATIN) cream Apply topically daily as needed        levothyroxine (SYNTHROID) 112 MCG tablet Take 112 mcg by mouth daily Take 112 mcg daily except for Friday takes an additional 56 mcg.  Brand name Synthroid       diphenhydrAMINE-acetaminophen (TYLENOL PM)  MG tablet Take 1 tablet by mouth At Bedtime Reported on 3/20/2017       fexofenadine (ALLEGRA) 180 MG tablet Take 180 mg by mouth daily. 120 0     MULTIVITAMINS OR TABS ONE DAILY 100 3     Allergies   Allergen Reactions     Nsaids Difficulty breathing     Increased creatinine     Toradol Difficulty breathing     Shortness of breath     Celecoxib Itching and Rash     Codeine Itching     With higher doses     No Clinical Screening - See Comments Itching     Fragrance     Vioxx Other (See Comments)     Heart races     Conjugated Estrogens Rash     Sulfa Drugs Rash         Review of Systems:  -{ROS:668435}  -Constitutional: The patient denies fatigue, fevers, chills, unintended weight loss, and night sweats.  -HEENT: Patient denies nonhealing oral sores.  -Skin: As above in HPI. No additional skin concerns.    Physical exam:  Vitals: There were no vitals  taken for this visit.  GEN:      GEN: This is a well developed, well-nourished female in no acute distress, in a pleasant mood.    SKIN: Focused examination of the face, scalp and fingernails was performed.  --midline part width 1.2, Savin 3  -regrowth layers:                        1st layer: 1-2 cm fibers                        2nd layer: 4-5 cm fibers with evidence of hair product present at the end of the                                      hair fibers                        3rd layer: cut layer   -decreased hair fiber density along medial aspect of bilateral eyebrows  -no hair loss present on eyelashes   {RFGeneralAppearance:895813}   SKIN: {Skin Exam:974036}  -{Skin Exam Derm:439815}  -{Skin Exam Derm:499179}  -{Skin Exam Derm:645215}  -No other lesions of concern on areas examined.     Impression/Plan:  1. PRP Study  2.   3. {Diagnosesderm:613873}    {rfplan:107566}    ***    4. {Diagnosesderm:653639}    {rfplan:516280}    ***    5. {Diagnosesderm:031955}    {rfplan:615551}    ***    6. {Diagnosesderm:718094}    {rfplan:689312}    ***    CC  *** on close of this encounter.  Follow-up {rfmultiple:468503} {follow up in days/weeks/months:725875}.       Staff Involved:  Staff Only                                                Renetta Meyer MD

## 2017-12-22 NOTE — NURSING NOTE
"Dermatology Rooming Note    Chasity Hodgson's goals for this visit include:   Chief Complaint   Patient presents with     Hair Loss     Sammie is here today for a hairloss follow up- Sammie states \"my hairloss seems worse\".      Susy Mccann MA    "

## 2017-12-29 ENCOUNTER — TELEPHONE (OUTPATIENT)
Dept: FAMILY MEDICINE | Facility: CLINIC | Age: 72
End: 2017-12-29

## 2017-12-29 NOTE — TELEPHONE ENCOUNTER
Spoke with patient:   Swollen lymph nodes under chin and under left arm and under right arm. Left arm worse than right - ongoing 2-3 months off/on (more on than off)  More fatigued recently ongoing for the past 2 months   Stoma site bleeds sometimes - Dr. Lawler colorectal provider saw pt for this - sometimes paper towel is covered with blood but stops after showering (saw him within last 2 weeks) - was given silver nitrate sticks for bleeding sites   Last Hgb 12.9 on 1/16/17    Advised OV. Pt did not want to see TEAM today - wants to wait to see PCP. Denies any recent changes in symptoms - has been about the same over the past 2 months. Advised call to be seen sooner if any worsening symptoms. Scheduled with PCP for 1/2/18 and will be seen sooner if sxs worsen before then.     Floresita ROSE RN

## 2017-12-29 NOTE — TELEPHONE ENCOUNTER
Reason for Call: Request for an order or referral:    Order or referral being requested: Hemoglobin    Date needed: as soon as possible    Has the patient been seen by the PCP for this problem? YES    Additional comments: Pt states she has been feeling very tired and would like to get hemoglobin checked. States lymph nodes are swollen a lot as well and she is wondering if it is all connected    Phone number Patient can be reached at:  Home number on file [phone] (home)    Best Time:  Anytime    Can we leave a detailed message on this number?  YES    Call taken on 12/29/2017 at 10:55 AM by Suma Sandoval

## 2018-01-02 ENCOUNTER — OFFICE VISIT (OUTPATIENT)
Dept: FAMILY MEDICINE | Facility: CLINIC | Age: 73
End: 2018-01-02
Payer: MEDICARE

## 2018-01-02 VITALS
WEIGHT: 203 LBS | HEART RATE: 64 BPM | OXYGEN SATURATION: 98 % | BODY MASS INDEX: 37.36 KG/M2 | HEIGHT: 62 IN | DIASTOLIC BLOOD PRESSURE: 56 MMHG | SYSTOLIC BLOOD PRESSURE: 144 MMHG

## 2018-01-02 DIAGNOSIS — R53.83 FATIGUE, UNSPECIFIED TYPE: Primary | ICD-10-CM

## 2018-01-02 DIAGNOSIS — R59.1 GENERALIZED ENLARGED LYMPH NODES: ICD-10-CM

## 2018-01-02 DIAGNOSIS — N63.0 LUMP OR MASS IN BREAST: ICD-10-CM

## 2018-01-02 DIAGNOSIS — D64.9 ANEMIA, UNSPECIFIED TYPE: ICD-10-CM

## 2018-01-02 LAB
BASOPHILS # BLD AUTO: 0 10E9/L (ref 0–0.2)
BASOPHILS NFR BLD AUTO: 0.4 %
DIFFERENTIAL METHOD BLD: ABNORMAL
EOSINOPHIL # BLD AUTO: 0.3 10E9/L (ref 0–0.7)
EOSINOPHIL NFR BLD AUTO: 3.6 %
ERYTHROCYTE [DISTWIDTH] IN BLOOD BY AUTOMATED COUNT: 12.5 % (ref 10–15)
HCT VFR BLD AUTO: 34.6 % (ref 35–47)
HGB BLD-MCNC: 11.6 G/DL (ref 11.7–15.7)
LYMPHOCYTES # BLD AUTO: 2.2 10E9/L (ref 0.8–5.3)
LYMPHOCYTES NFR BLD AUTO: 27.9 %
MCH RBC QN AUTO: 30.5 PG (ref 26.5–33)
MCHC RBC AUTO-ENTMCNC: 33.5 G/DL (ref 31.5–36.5)
MCV RBC AUTO: 91 FL (ref 78–100)
MONOCYTES # BLD AUTO: 0.6 10E9/L (ref 0–1.3)
MONOCYTES NFR BLD AUTO: 7.9 %
NEUTROPHILS # BLD AUTO: 4.8 10E9/L (ref 1.6–8.3)
NEUTROPHILS NFR BLD AUTO: 60.2 %
PLATELET # BLD AUTO: 248 10E9/L (ref 150–450)
RBC # BLD AUTO: 3.8 10E12/L (ref 3.8–5.2)
WBC # BLD AUTO: 8 10E9/L (ref 4–11)

## 2018-01-02 PROCEDURE — 84443 ASSAY THYROID STIM HORMONE: CPT | Performed by: INTERNAL MEDICINE

## 2018-01-02 PROCEDURE — 83540 ASSAY OF IRON: CPT | Performed by: INTERNAL MEDICINE

## 2018-01-02 PROCEDURE — 82607 VITAMIN B-12: CPT | Performed by: INTERNAL MEDICINE

## 2018-01-02 PROCEDURE — 85025 COMPLETE CBC W/AUTO DIFF WBC: CPT | Performed by: INTERNAL MEDICINE

## 2018-01-02 PROCEDURE — 99214 OFFICE O/P EST MOD 30 MIN: CPT | Performed by: INTERNAL MEDICINE

## 2018-01-02 PROCEDURE — 82728 ASSAY OF FERRITIN: CPT | Performed by: INTERNAL MEDICINE

## 2018-01-02 PROCEDURE — 36415 COLL VENOUS BLD VENIPUNCTURE: CPT | Performed by: INTERNAL MEDICINE

## 2018-01-02 PROCEDURE — 83550 IRON BINDING TEST: CPT | Performed by: INTERNAL MEDICINE

## 2018-01-02 NOTE — PATIENT INSTRUCTIONS
(R53.54) Fatigue, unspecified type  (primary encounter diagnosis)  Comment: We will check complete blood counts and TSH and request follow up mammogram -  [facility] (also performs diagnostic mammogram, ultrasound and biopsy) [phone].  - CT abdomen and pelvis returned stable 1 year ago.   Plan: CBC with platelets and differential, TSH with         free T4 reflex

## 2018-01-02 NOTE — MR AVS SNAPSHOT
After Visit Summary   1/2/2018    Chasity Hodgson    MRN: [id number]           Patient Information     Date Of Birth          1945        Visit Information        Provider Department      1/2/2018 5:00 PM Shola Benoit MD [facility]        Today's Diagnoses     Fatigue, unspecified type    -  1      Care Instructions    (R53.83) Fatigue, unspecified type  (primary encounter diagnosis)  Comment: We will check complete blood counts and TSH and request follow up mammogram -  [facility] [place] (also performs diagnostic mammogram, ultrasound and biopsy) [phone].  - CT abdomen and pelvis returned stable 1 year ago.   Plan: CBC with platelets and differential, TSH with         free T4 reflex                   Follow-ups after your visit        Your next 10 appointments already scheduled     Regulo 10, 2018 12:30 PM CST   Return Visit with MAGALY Ramos [facility] ([facility])    [address] [id number]  [facility] [address] [phone]            Feb 12, 2018 10:30 AM CST   Pre-Op physical with Shola Benoit MD   [facility]    [address] [facility] [id number] [phone]            Mar 12, 2018 10:30 AM CDT   (Arrive by 10:15 AM)   RETURN HAIRLOSS with Renetta Meyer MD   [facility] ([facility])    [address] [place] [address]  3rd Floor  [facility] [id number] [phone]              Who to contact     If you have questions or need follow up information about today's clinic visit or your schedule please contact [facility] directly at [phone].  Normal or non-critical lab and imaging results will be communicated to you by MyChart, letter or phone within 4 business days after the clinic has received the results. If you do not hear from us within 7 days, please  "contact the clinic through 1calendar or phone. If you have a critical or abnormal lab result, we will notify you by phone as soon as possible.  Submit refill requests through 1calendar or call your pharmacy and they will forward the refill request to us. Please allow 3 business days for your refill to be completed.          Additional Information About Your Visit        OVGuide[facility]om[facility]ell Information     1calendar gives you secure access to your electronic health record. If you see a primary care provider, you can also send messages to your care team and make appointments. If you have questions, please call your primary care clinic.  If you do not have a primary care provider, please call [phone] and they will assist you.        Care EveryWhere ID     This is your Care EveryWhere ID. This could be used by other organizations to access your [facility] medical records  [id number]        Your Vitals Were     Pulse Height Pulse Oximetry Breastfeeding? BMI (Body Mass Index)       64 5' 1.5\" (1.562 m) 98% No 37.74 kg/m2        Blood Pressure from Last 3 Encounters:   01/02/18 144/56   12/20/17 135/71   12/10/17 156/71    Weight from Last 3 Encounters:   01/02/18 203 lb (92.1 kg)   12/20/17 203 lb (92.1 kg)   12/10/17 205 lb 5 oz (93.1 kg)              We Performed the Following     CBC with platelets and differential     TSH with free T4 reflex          Today's Medication Changes          These changes are accurate as of: 1/2/18  5:37 PM.  If you have any questions, ask your nurse or doctor.               These medicines have changed or have updated prescriptions.        Dose/Directions    metFORMIN 500 MG tablet   Commonly known as:  GLUCOPHAGE   This may have changed:  how much to take   Changed by:  Shola Benoit MD        Dose:  1000 mg   Take 2 tablets (1,000 mg) by mouth daily (with dinner)   Refills:  0                Primary Care Provider Office Phone # Fax #    Shola Benoit [phone] "       [address] [facility] [address]  [facility] [address]        Equal Access to Services     CORINNE ROBB : Hadii aad ku hadjudyo Sosavitaali, waaxda luqadaha, qaybta kasabrinada miraclekeltonjoshua, jose burleson mahendraralph sandersdorinaletha kim. So [facility] [phone].    ATENCIÓN: Si habla español, tiene a modi disposición servicios gratuitos de asistencia lingüística. Llame al [phone].    We comply with applicable federal civil rights laws and [place] laws. We do not discriminate on the basis of race, color, national origin, age, disability, sex, sexual orientation, or gender identity.            Thank you!     Thank you for choosing [facility]  for your care. Our goal is always to provide you with excellent care. Hearing back from our patients is one way we can continue to improve our services. Please take a few minutes to complete the written survey that you may receive in the mail after your visit with us. Thank you!             Your Updated Medication List - Protect others around you: Learn how to safely use, store and throw away your medicines at www.disposemymeds.org.          This list is accurate as of: 1/2/18  5:37 PM.  Always use your most recent med list.                   Brand Name Dispense Instructions for use Diagnosis    ascorbic acid 1000 MG Tabs    vitamin C     Take 1,000 mg by mouth daily        ASTEPRO NA           atenolol 50 MG tablet    TENORMIN    180 tablet    Take 1 tablet (50 mg) by mouth 2 times daily    Essential hypertension       BIOTIN PO      Take 1,000 mcg by mouth        blood glucose monitoring test strip    no brand specified    1 Box    Use to test blood sugar 1 times daily or as directed.    Type 2 diabetes mellitus with other specified complication (H)       clotrimazole 1 % cream    LOTRIMIN     Apply topically daily        cycloSPORINE 0.05 % ophthalmic emulsion    RESTASIS     1 drop 2 times daily        diphenhydrAMINE-acetaminophen  MG tablet    TYLENOL PM     Take 1 tablet by  mouth At Bedtime Reported on 3/20/2017        famotidine 40 MG tablet    PEPCID     Take 40 mg by mouth At Bedtime        fenofibrate 145 MG tablet     90 tablet    Take 1 tablet (145 mg) by mouth daily    CARDIOVASCULAR SCREENING; LDL GOAL LESS THAN 130       fexofenadine 180 MG tablet    ALLEGRA    120    Take 180 mg by mouth daily.        fluticasone 50 MCG/ACT spray    FLONASE    1 Bottle    Spray 2 sprays in nostril daily 2 sprays in each nostril qd    Seasonal allergic rhinitis due to other allergic trigger       gabapentin 100 MG capsule    NEURONTIN    90 capsule    Take 1 capsule (100 mg) by mouth every evening as needed    Poliomyelitis       hydrochlorothiazide 25 MG tablet    HYDRODIURIL    90 tablet    Take 1 tablet (25 mg) by mouth daily    Essential hypertension       lisinopril 20 MG tablet    PRINIVIL/ZESTRIL    90 tablet    Take 1 tablet (20 mg) by mouth daily    Essential hypertension       metFORMIN 500 MG tablet    GLUCOPHAGE     Take 2 tablets (1,000 mg) by mouth daily (with dinner)        metoclopramide 10 MG tablet    REGLAN     Take 1-2 tabs as needed        multivitamin per tablet     100    ONE DAILY        nitroGLYcerin 0.4 MG sublingual tablet    NITROSTAT    25 tablet    Place 1 tablet (0.4 mg) under the tongue every 5 minutes as needed for chest pain (no more than 3 in one hour; after 3rd, call 911.)    Atypical chest pain       nystatin cream    MYCOSTATIN     Apply topically daily as needed        nystatin-triamcinolone cream    MYCOLOG II     Apply topically daily as needed        ondansetron 4 MG tablet    ZOFRAN     Take 1 tablet by mouth every 6 hours as needed Reported on 3/20/2017        * order for DME     1 Units    Equipment being ordered: Oral appliance for sleep apnea    ANNETTE (obstructive sleep apnea)       * order for DME     1 each    Donut Pillow    Coccydynia       * order for DME     2 each    Equipment being ordered: Compression stockings - Knee High; 20-30 mmHg  compression    Insufficiency, arterial, peripheral (H)       pantoprazole 40 MG EC tablet    PROTONIX     Take 40 mg by mouth 2 times daily        SB LOW DOSE ASA EC 81 MG EC tablet   Generic drug:  aspirin      Take 81 mg by mouth daily        sitagliptin 50 MG tablet    JANUVIA     Take 50 mg by mouth daily        SYNTHROID 112 MCG tablet   Generic drug:  levothyroxine      Take 112 mcg by mouth daily Take 112 mcg daily except for Friday takes an additional 56 mcg.  Brand name Synthroid        TOUJEO SOLOSTAR 300 UNIT/ML injection   Generic drug:  insulin glargine U-300      Inject Subcutaneous every morning        triamcinolone 0.1 % cream    KENALOG     Apply topically daily        VITAMIN D-3 PO      Take 2 tablets by mouth        * Notice:  This list has 3 medication(s) that are the same as other medications prescribed for you. Read the directions carefully, and ask your doctor or other care provider to review them with you.

## 2018-01-02 NOTE — PROGRESS NOTES
"[facility]  CLINIC PROGRESS NOTE    Subjective:  Fatigue   Chasity Hodgson has noticed that she has had pretty bad fatigue over the past few weeks/ months.  She is concerned given upcoming shoulder surgery.  She also notes swelling on neck and under left axilla that come and go.  Lymph nodes are aching at times - not too bad today.  No blood in her stool.  She has had an occasional darker stool.  She does have some right sided groin pain at times - has a history of colon polyp on the right side ascending colon 12 years ago.  More recent colonoscopy was in 01/10 and was normal.     Past medical history, medications, allergies, social history, family history reviewed and updated in [facility] as of 1/2/2018 .    ROS  CONSTITUTIONAL: + fatigue, no unexpected change in weight  SKIN: no worrisome rashes, no worrisome moles, no worrisome lesions  EYES: no acute vision problems or changes  ENT: no ear problems, no mouth problems, no throat problems  RESP: no significant cough, no shortness of breath  CV: no chest pain, no palpitations, no new or worsening peripheral edema  GI: abdominal pain as above, no nausea, no vomiting, no constipation, no diarrhea  : no frequency, no dysuria, no hematuria  MS: no claudication, no myalgias, no joint aches  PSYCHIATRIC: no changes in mood or affect      Objective:  Vitals  /56  Pulse 64  Ht 5' 1.5\" (1.562 m)  Wt 203 lb (92.1 kg)  SpO2 98%  Breastfeeding? No  BMI 37.74 kg/m2  GEN: Alert Oriented x3 NAD  HEENT: Atraumatic, normocephalic, neck supple, no thyromegaly, negative cervical adenopathy  CV: RRR no murmurs or rubs  PULM: CTA no wheezes or crackles  BREAST: Left side with soft tissue nodule/fibrous tissue in the upper outer aspect of the breast  ABD: Soft, nontender nondistended, no hepatosplenomegally  SKIN: No visible skin lesion or ulcerations  EXT: trace edema bilateral lower extremities  NEURO: Gait and station deferred, No focal neurologic deficits  PSYCH: " Mood good, affect mood congruent    No images are attached to the encounter.    Results for orders placed or performed in visit on 01/02/18 (from the past 24 hour(s))   CBC with platelets and differential   Result Value Ref Range    WBC 8.0 4.0 - 11.0 10e9/L    RBC Count 3.80 3.8 - 5.2 10e12/L    Hemoglobin 11.6 (L) 11.7 - 15.7 g/dL    Hematocrit 34.6 (L) 35.0 - 47.0 %    MCV 91 78 - 100 fl    MCH 30.5 26.5 - 33.0 pg    MCHC 33.5 31.5 - 36.5 g/dL    RDW 12.5 10.0 - 15.0 %    Platelet Count 248 150 - 450 10e9/L    Diff Method Automated Method     % Neutrophils 60.2 %    % Lymphocytes 27.9 %    % Monocytes 7.9 %    % Eosinophils 3.6 %    % Basophils 0.4 %    Absolute Neutrophil 4.8 1.6 - 8.3 10e9/L    Absolute Lymphocytes 2.2 0.8 - 5.3 10e9/L    Absolute Monocytes 0.6 0.0 - 1.3 10e9/L    Absolute Eosinophils 0.3 0.0 - 0.7 10e9/L    Absolute Basophils 0.0 0.0 - 0.2 10e9/L       Assessment/Plan:  Patient Instructions   (R53.83) Fatigue, unspecified type  (primary encounter diagnosis)  Comment: We will check complete blood counts and TSH and request follow up mammogram -  [facility] (also performs diagnostic mammogram, ultrasound and biopsy) [phone].  - CT abdomen and pelvis returned stable 1 year ago.   Plan: CBC with platelets and differential, TSH with         free T4 reflex             Follow up for mammogram      Disclaimer: This note consists of symbols derived from keyboarding, dictation and/or voice recognition software. As a result, there may be errors in the script that have gone undetected. Please consider this when interpreting information found in this chart.    Shola Benoit MD  [phone]

## 2018-01-03 LAB — TSH SERPL DL<=0.005 MIU/L-ACNC: 0.5 MU/L (ref 0.4–4)

## 2018-01-04 LAB
FERRITIN SERPL-MCNC: 176 NG/ML (ref 8–252)
IRON SATN MFR SERPL: 23 % (ref 15–46)
IRON SERPL-MCNC: 86 UG/DL (ref 35–180)
TIBC SERPL-MCNC: 379 UG/DL (ref 240–430)
VIT B12 SERPL-MCNC: 1077 PG/ML (ref 193–986)

## 2018-01-05 NOTE — PROGRESS NOTES
Gerard Gaspar,    I have had the opportunity to review your recent results and an interpretation is as follows:  Your follow up hemoglobin returned again a bit low  Your iron levels did return a bit low as well with low normal % iron saturation     I would recommend discussion with Dr. Pantoja to schedule a follow up colonoscopy and consider capsule endoscopy to look for source of potential blood loss    Sincerely,  Shola Benoit MD

## 2018-01-09 ENCOUNTER — OFFICE VISIT (OUTPATIENT)
Dept: FAMILY MEDICINE | Facility: CLINIC | Age: 73
End: 2018-01-09
Payer: MEDICARE

## 2018-01-09 VITALS — HEART RATE: 67 BPM | DIASTOLIC BLOOD PRESSURE: 80 MMHG | OXYGEN SATURATION: 100 % | SYSTOLIC BLOOD PRESSURE: 142 MMHG

## 2018-01-09 DIAGNOSIS — R10.31 ABDOMINAL PAIN, RIGHT LOWER QUADRANT: Primary | ICD-10-CM

## 2018-01-09 PROCEDURE — 36415 COLL VENOUS BLD VENIPUNCTURE: CPT | Performed by: INTERNAL MEDICINE

## 2018-01-09 PROCEDURE — 99214 OFFICE O/P EST MOD 30 MIN: CPT | Performed by: INTERNAL MEDICINE

## 2018-01-09 PROCEDURE — 80076 HEPATIC FUNCTION PANEL: CPT | Performed by: INTERNAL MEDICINE

## 2018-01-09 NOTE — PATIENT INSTRUCTIONS
(R10.31) Abdominal pain, right lower quadrant  (primary encounter diagnosis)  Comment: We will check liver function tests and request a CT of your abdomen - [facility] phone #[phone] to schedule.  OK to continue acetaminophen  Plan: Hepatic panel (Albumin, ALT, AST, Bili, Alk         Phos, TP), CT Abdomen Pelvis w Contrast

## 2018-01-09 NOTE — MR AVS SNAPSHOT
After Visit Summary   1/9/2018    Chasity Hodgson    MRN: [id number]           Patient Information     Date Of Birth          1945        Visit Information        Provider Department      1/9/2018 5:30 PM Shola Benoit MD [facility]        Today's Diagnoses     Abdominal pain, right lower quadrant    -  1      Care Instructions    (R10.31) Abdominal pain, right lower quadrant  (primary encounter diagnosis)  Comment: We will check liver function tests and request a CT of your abdomen - [facility] phone #[phone] to schedule.  OK to continue acetaminophen  Plan: Hepatic panel (Albumin, ALT, AST, Bili, Alk         Phos, TP), CT Abdomen Pelvis w Contrast                     Follow-ups after your visit        Your next 10 appointments already scheduled     Regulo 10, 2018 12:30 PM CST   Return Visit with MAGALY Ramos CNP   [facility] ([facility])    [address]  [facility] [address] [phone]            Regulo 10, 2018  2:00 PM CST   (Arrive by 1:45 PM)   MA DIAGNOSTIC LEFT W/ TOM with SHBCMA3   [facility] ([facility])    [address], [address]  [facility] [address] [phone]           Do not use any powder, lotion or deodorant under your arms or on your breast. If you do, we will ask you to remove it before your exam.  Wear comfortable, two-piece clothing.  If you have any allergies, tell your care team.  Bring any previous mammograms from other facilities or have them mailed to the breast center.  Three-dimensional (3D) mammograms are available at [facility] locations in [facility], [place], [place], [facility], [place], [place], and [place]. -Health locations include [facility] in [place]. Benefits of 3D mammograms include: - Improved rate of cancer detection - Decreases your  "chance of having to go back for more tests, which means fewer: - \"False-positive\" results (This means that there is an abnormal area but it isn't cancer.) - Invasive testing procedures, such as a biopsy or surgery - Can provide clearer images of the breast if you have dense breast tissue. 3D mammography is an optional exam that anyone can have with a 2D mammogram. It doesn't replace or take the place of a 2D mammogram. 2D mammograms remain an effective screening test for all women.  Not all insurance companies cover the cost of a 3D mammogram. Check with your insurance.            Regulo 10, 2018  2:30 PM CST   US BREAST LEFT LIMITED 1-3 QUAD with SHBCUS1   [facility] ([facility])    [address] [place] [address], [address] [id number] [phone]           Please bring a list of your medicines (including vitamins, minerals and over-the-counter drugs). Also, tell your doctor about any allergies you may have. Wear comfortable clothes and leave your valuables at home.  You do not need to do anything special to prepare for your exam.  Please call the Imaging Department at your exam site with any questions.            Feb 12, 2018 10:30 AM CST   Pre-Op physical with Shola Benoit MD   [facility]    [address] [place] [id number] [phone]            Mar 12, 2018 10:30 AM CDT   (Arrive by 10:15 AM)   RETURN HAIRLOSS with Renetta Meyer MD   [facility] ([facility])    [address] [place] [address]  3rd Floor  [facility] [address] [phone]              Future tests that were ordered for you today     Open Future Orders        Priority Expected Expires Ordered    CT Abdomen Pelvis w Contrast Routine  1/9/2019 1/9/2018            Who to contact     If you have questions or need follow up information about today's clinic visit or your schedule please contact [facility] directly at [phone].  Normal or non-critical lab and imaging results will be communicated to you by MyChart, letter or phone within 4 business days after the clinic has received the results. If you do not hear from us within 7 days, please contact the clinic through [email or web address] or phone. If you have a critical or abnormal lab result, we will notify you by phone as soon as possible.  Submit refill requests through [employer] or call your pharmacy and they will forward the refill request to us. Please allow 3 business days for your refill to be completed.          Additional Information About Your Visit        [employer] Apps Information     [employer] gives you secure access to your electronic health record. If you see a primary care provider, you can also send messages to your care team and make appointments. If you have questions, please call your primary care clinic.  If you do not have a primary care provider, please call [phone] and they will assist you.        Care EveryWhere ID     This is your Care EveryWhere ID. This could be used by other organizations to access your [facility] medical records  [id number]        Your Vitals Were     Pulse Pulse Oximetry Breastfeeding?             67 100% No          Blood Pressure from Last 3 Encounters:   01/09/18 142/80   01/02/18 144/56   12/20/17 135/71    Weight from Last 3 Encounters:   01/02/18 203 lb (92.1 kg)   12/20/17 203 lb (92.1 kg)   12/10/17 205 lb 5 oz (93.1 kg)              We Performed the Following     Hepatic panel (Albumin, ALT, AST, Bili, Alk Phos, TP)        Primary Care Provider Office Phone # Fax #    Shola Benoit [phone] [address]  [place] [address]        Equal Access to Services     JONA ROBB : Hadanh Olea, warere izaguirre, qaybta kaaltaylor cordero, jose kim. So [facility] [phone].    ATENCIÓN: Si habla español, tiene a modi disposición  servicios gratuitos de asistencia lingüística. Romaine carr [phone].    We comply with applicable federal civil rights laws and [place] laws. We do not discriminate on the basis of race, color, national origin, age, disability, sex, sexual orientation, or gender identity.            Thank you!     Thank you for choosing [facility]  for your care. Our goal is always to provide you with excellent care. Hearing back from our patients is one way we can continue to improve our services. Please take a few minutes to complete the written survey that you may receive in the mail after your visit with us. Thank you!             Your Updated Medication List - Protect others around you: Learn how to safely use, store and throw away your medicines at www.disposemymeds.org.          This list is accurate as of: 1/9/18  5:49 PM.  Always use your most recent med list.                   Brand Name Dispense Instructions for use Diagnosis    ascorbic acid 1000 MG Tabs    vitamin C     Take 1,000 mg by mouth daily        ASTEPRO NA           atenolol 50 MG tablet    TENORMIN    180 tablet    Take 1 tablet (50 mg) by mouth 2 times daily    Essential hypertension       BIOTIN PO      Take 1,000 mcg by mouth        blood glucose monitoring test strip    no brand specified    1 Box    Use to test blood sugar 1 times daily or as directed.    Type 2 diabetes mellitus with other specified complication (H)       clotrimazole 1 % cream    LOTRIMIN     Apply topically daily        cycloSPORINE 0.05 % ophthalmic emulsion    RESTASIS     1 drop 2 times daily        diphenhydrAMINE-acetaminophen  MG tablet    TYLENOL PM     Take 1 tablet by mouth At Bedtime Reported on 3/20/2017        famotidine 40 MG tablet    PEPCID     Take 40 mg by mouth At Bedtime        fenofibrate 145 MG tablet     90 tablet    Take 1 tablet (145 mg) by mouth daily    CARDIOVASCULAR SCREENING; LDL GOAL LESS THAN 130       fexofenadine 180 MG tablet     ALLEGRA    120    Take 180 mg by mouth daily.        fluticasone 50 MCG/ACT spray    FLONASE    1 Bottle    Spray 2 sprays in nostril daily 2 sprays in each nostril qd    Seasonal allergic rhinitis due to other allergic trigger       gabapentin 100 MG capsule    NEURONTIN    90 capsule    Take 1 capsule (100 mg) by mouth every evening as needed    Poliomyelitis       hydrochlorothiazide 25 MG tablet    HYDRODIURIL    90 tablet    Take 1 tablet (25 mg) by mouth daily    Essential hypertension       lisinopril 20 MG tablet    PRINIVIL/ZESTRIL    90 tablet    Take 1 tablet (20 mg) by mouth daily    Essential hypertension       metFORMIN 500 MG tablet    GLUCOPHAGE     Take 2 tablets (1,000 mg) by mouth daily (with dinner)        metoclopramide 10 MG tablet    REGLAN     Take 1-2 tabs as needed        multivitamin per tablet     100    ONE DAILY        nitroGLYcerin 0.4 MG sublingual tablet    NITROSTAT    25 tablet    Place 1 tablet (0.4 mg) under the tongue every 5 minutes as needed for chest pain (no more than 3 in one hour; after 3rd, call 911.)    Atypical chest pain       nystatin cream    MYCOSTATIN     Apply topically daily as needed        nystatin-triamcinolone cream    MYCOLOG II     Apply topically daily as needed        ondansetron 4 MG tablet    ZOFRAN     Take 1 tablet by mouth every 6 hours as needed Reported on 3/20/2017        * order for DME     1 Units    Equipment being ordered: Oral appliance for sleep apnea    ANNETTE (obstructive sleep apnea)       * order for DME     1 each    Donut Pillow    Coccydynia       * order for DME     2 each    Equipment being ordered: Compression stockings - Knee High; 20-30 mmHg compression    Insufficiency, arterial, peripheral (H)       pantoprazole 40 MG EC tablet    PROTONIX     Take 40 mg by mouth 2 times daily        SB LOW DOSE ASA EC 81 MG EC tablet   Generic drug:  aspirin      Take 81 mg by mouth daily        sitagliptin 50 MG tablet    JANUVIA     Take 50 mg  by mouth daily        SYNTHROID 112 MCG tablet   Generic drug:  levothyroxine      Take 112 mcg by mouth daily Take 112 mcg daily except for Friday takes an additional 56 mcg.  Brand name Synthroid        TOUJEO SOLOSTAR 300 UNIT/ML injection   Generic drug:  insulin glargine U-300      Inject Subcutaneous every morning        triamcinolone 0.1 % cream    KENALOG     Apply topically daily        VITAMIN D-3 PO      Take 2 tablets by mouth        * Notice:  This list has 3 medication(s) that are the same as other medications prescribed for you. Read the directions carefully, and ask your doctor or other care provider to review them with you.

## 2018-01-09 NOTE — PROGRESS NOTES
[facility]  CLINIC PROGRESS NOTE    Subjective:  Abdominal pain   Chasity Hodgson has continued to have right sided groin pain.  She has had occasional episodes of constipation, but stool consistency has usually been loose.  Pain symptoms have been present for the past 2-3 weeks.  She has had intermttent abdominal pain/cramping in the past, but this feels different.  No urinary symptoms.  She did have a yeast infection on the right groin that cleared up.  Tylenol has helped      Past medical history, medications, allergies, social history, family history reviewed and updated in EPIC as of 1/9/2018 .    ROS  CONSTITUTIONAL: no fatigue, no unexpected change in weight  SKIN: no worrisome rashes, no worrisome moles, no worrisome lesions  EYES: no acute vision problems or changes  ENT: no ear problems, no mouth problems, no throat problems  RESP: no significant cough, no shortness of breath  CV: no chest pain, no palpitations, no new or worsening peripheral edema  GI: as above   : no frequency, no dysuria, no hematuria  MS: not discussed   PSYCHIATRIC: no changes in mood or affect      Objective:  Vitals  /80  Pulse 67  SpO2 100%  Breastfeeding? No  GEN: Alert Oriented x3 NAD  CV: RRR no murmurs or rubs  PULM: CTA no wheezes or crackles  ABD: Soft, tenderness right lower quadrant and left lower quadrant - no guarding   SKIN: No visible skin lesion or ulcerations  EXT: 2+ dorsal pedis pulses, no edema bilateral lower extremities  NEURO: Gait and station deferred, No focal neurologic deficits  PSYCH: Mood good, affect mood congruent    No images are attached to the encounter.    No results found for this or any previous visit (from the past 24 hour(s)).    Assessment/Plan:  Patient Instructions   (R10.31) Abdominal pain, right lower quadrant  (primary encounter diagnosis)  Comment: We will check liver function tests and request a CT of your abdomen - [facility] phone #[phone] to schedule.   OK to continue acetaminophen  Plan: Hepatic panel (Albumin, ALT, AST, Bili, Alk         Phos, TP), CT Abdomen Pelvis w Contrast               Follow up pending CT     25 minutes spent with patient.  Over 50% of time counseling      Disclaimer: This note consists of symbols derived from keyboarding, dictation and/or voice recognition software. As a result, there may be errors in the script that have gone undetected. Please consider this when interpreting information found in this chart.    Shola Benoit MD  [phone]

## 2018-01-09 NOTE — NURSING NOTE
"Chief Complaint   Patient presents with     RECHECK     Pain in lower right side (groin area) pain in center lower back; sx getting worse       Initial /80  Pulse 67  SpO2 100%  Breastfeeding? No Estimated body mass index is 37.74 kg/(m^2) as calculated from the following:    Height as of 1/2/18: 5' 1.5\" (1.562 m).    Weight as of 1/2/18: 203 lb (92.1 kg).  Medication Reconciliation: complete   Jonatan Chicas CMA   "

## 2018-01-10 ENCOUNTER — HOSPITAL ENCOUNTER (OUTPATIENT)
Dept: MAMMOGRAPHY | Facility: CLINIC | Age: 73
End: 2018-01-10
Attending: INTERNAL MEDICINE
Payer: MEDICARE

## 2018-01-10 ENCOUNTER — HOSPITAL ENCOUNTER (OUTPATIENT)
Dept: MAMMOGRAPHY | Facility: CLINIC | Age: 73
Discharge: HOME | End: 2018-01-10
Attending: INTERNAL MEDICINE | Admitting: INTERNAL MEDICINE
Payer: MEDICARE

## 2018-01-10 ENCOUNTER — OFFICE VISIT (OUTPATIENT)
Dept: CARDIOLOGY | Facility: CLINIC | Age: 73
End: 2018-01-10
Attending: NURSE PRACTITIONER
Payer: MEDICARE

## 2018-01-10 VITALS
HEART RATE: 63 BPM | SYSTOLIC BLOOD PRESSURE: 172 MMHG | HEIGHT: 62 IN | WEIGHT: 201 LBS | BODY MASS INDEX: 36.99 KG/M2 | DIASTOLIC BLOOD PRESSURE: 70 MMHG

## 2018-01-10 DIAGNOSIS — N63.21 UNSPECIFIED LUMP IN THE LEFT BREAST, UPPER OUTER QUADRANT: ICD-10-CM

## 2018-01-10 DIAGNOSIS — R07.89 ATYPICAL CHEST PAIN: ICD-10-CM

## 2018-01-10 DIAGNOSIS — R59.1 GENERALIZED ENLARGED LYMPH NODES: ICD-10-CM

## 2018-01-10 DIAGNOSIS — N63.0 LUMP OR MASS IN BREAST: ICD-10-CM

## 2018-01-10 DIAGNOSIS — I10 BENIGN ESSENTIAL HYPERTENSION: ICD-10-CM

## 2018-01-10 LAB
ALBUMIN SERPL-MCNC: 3.5 G/DL (ref 3.4–5)
ALP SERPL-CCNC: 43 U/L (ref 40–150)
ALT SERPL W P-5'-P-CCNC: 34 U/L (ref 0–50)
AST SERPL W P-5'-P-CCNC: 38 U/L (ref 0–45)
BILIRUB DIRECT SERPL-MCNC: <0.1 MG/DL (ref 0–0.2)
BILIRUB SERPL-MCNC: 0.3 MG/DL (ref 0.2–1.3)
PROT SERPL-MCNC: 6.9 G/DL (ref 6.8–8.8)

## 2018-01-10 PROCEDURE — 77065 DX MAMMO INCL CAD UNI: CPT

## 2018-01-10 PROCEDURE — 99214 OFFICE O/P EST MOD 30 MIN: CPT | Performed by: NURSE PRACTITIONER

## 2018-01-10 PROCEDURE — 76642 ULTRASOUND BREAST LIMITED: CPT | Mod: LT

## 2018-01-10 RX ORDER — AMLODIPINE BESYLATE 5 MG/1
5 TABLET ORAL DAILY
Qty: 30 TABLET | Refills: 3 | Status: SHIPPED | OUTPATIENT
Start: 2018-01-10 | End: 2018-02-02

## 2018-01-10 NOTE — PROGRESS NOTES
HPI and Plan:   I had the pleasure of seeing Chasity Rodriguez today in cardiology clinic follow-up.  She is a pleasant 72-year-old patient of Dr. Ortiz.  She has a history of atypical chest discomfort, hypertension and lower extremity edema.  She has a history of GERD, a year ago she had dilated esophageal strictures, she takes Protonix, and more recently her GI doctor switched her ranitidine to Lodine.  She has hypertension and carotid disease. She has a history of childhood polio for which she wears a right ankle brace.  She recently saw Dr. Ortiz for her atypical chest discomfort, I saw her in follow up and asked her to closely monitor her symptoms and come back today. She continues to work for the [employer].     In 2011 CT coronary angiography demonstrated the absence of obstructive coronary disease.  Her last nuclear stress test in 2014 showed no evidence of ischemia or infarct, she had normal LV systolic function.  She does have peripheral artery disease based on an evaluation done by Dr. Holliday in 2016.  She wore a Holter monitor which previously demonstrated a normal sinus rhythm with average heart rate variability and isolated PVCs.     Recently Chasity saw Dr. Ortiz after an episode of atypical chest chest discomfort after a stressful event.  Dr. Ortiz felt it was less likely to represent coronary ischemia given her recent stress echocardiogram where she exercised for 4 minutes and reach 71% of her age-predicted heart rate without evidence of ischemia or reproduction of chest discomfort.  For this reason she Dr. Ortiz decided to take a little bit of a watch and see approach, and if she has recurrent symptoms to try nitro to see if it alleviated them.  I met her in December she continued to have atypical symptoms sometimes at night, sometimes when she is driving occurring about 4 times a week, she wasn't able to really try nitroglycerin and doesn't know if that would've helped.  She was borderline  hypertensive at her last visit.  I asked her to monitor her symptoms and keep track of her blood pressure and how often she is having these atypical chest symptoms.    Today she brings with her some notes of her symptoms.  It turns out she's been having quite a bit of abdominal and lower back symptoms, tomorrow she will have a CT.  She has noticed chest discomfort in her left breast, this can happen at rest and with exertion.  It did occur on one occasion and she used a sublingual nitroglycerin which did seem to help, though she wasn't sure if the symptoms would've gone away on their own or not.  She's been experiencing some headaches the last couple of days, on one such occasion her systolic blood pressure was 160.  Her home blood pressures however have been quite high in the 170s to 180s.     Physical exam  Please see below     Assessment and plan.  1.                   Atypical chest discomfort.  She continues to have these symptoms, at times it appears that nitro helps them.  For this reason we will go ahead and repeat her CT coronary angiogram.  Because she is having a CT abdomen and pelvis tomorrow, I will have her check her BMP on January 19, if her renal function is stable at that time and the following Monday she can go ahead and have her CTA.  2.                   Hypertension.  Her blood pressure is definitely elevated today in clinic at 172/70 and also by her home records.  She is not currently having a headache.  She is on atenolol 50 b.i.d., lisinopril 20, hydrochlorothiazide 25 mg daily.  I have gone ahead and added amlodipine 5 mg.  She may develop some lower extremity edema from this, we will have to see how well she can tolerate it.     I will see Chasity in follow-up of her CTA and blood pressure changes.  Leatha Rudolph, APRN, CNP        Orders Placed This Encounter   Procedures     CT Angiogram coronary artery     Basic metabolic panel       Orders Placed This Encounter   Medications      amLODIPine (NORVASC) 5 MG tablet     Sig: Take 1 tablet (5 mg) by mouth daily     Dispense:  30 tablet     Refill:  3       There are no discontinued medications.      Encounter Diagnoses   Name Primary?     Atypical chest pain      Benign essential hypertension        CURRENT MEDICATIONS:  Current Outpatient Prescriptions   Medication Sig Dispense Refill     amLODIPine (NORVASC) 5 MG tablet Take 1 tablet (5 mg) by mouth daily 30 tablet 3     metFORMIN (GLUCOPHAGE) 500 MG tablet Take 2 tablets (1,000 mg) by mouth daily (with dinner)       hydrochlorothiazide (HYDRODIURIL) 25 MG tablet Take 1 tablet (25 mg) by mouth daily 90 tablet 3     pantoprazole (PROTONIX) 40 MG EC tablet Take 40 mg by mouth 2 times daily       insulin glargine U-300 (TOUJEO SOLOSTAR) 300 UNIT/ML injection Inject Subcutaneous every morning       famotidine (PEPCID) 40 MG tablet Take 40 mg by mouth At Bedtime       Azelastine HCl (ASTEPRO NA)        triamcinolone (KENALOG) 0.1 % cream Apply topically daily       clotrimazole (LOTRIMIN) 1 % cream Apply topically daily       nitroGLYcerin (NITROSTAT) 0.4 MG sublingual tablet Place 1 tablet (0.4 mg) under the tongue every 5 minutes as needed for chest pain (no more than 3 in one hour; after 3rd, call 911.) 25 tablet 3     atenolol (TENORMIN) 50 MG tablet Take 1 tablet (50 mg) by mouth 2 times daily 180 tablet 0     lisinopril (PRINIVIL/ZESTRIL) 20 MG tablet Take 1 tablet (20 mg) by mouth daily 90 tablet 2     fenofibrate 145 MG tablet Take 1 tablet (145 mg) by mouth daily 90 tablet 2     order for DME Equipment being ordered: Compression stockings - Knee High; 20-30 mmHg compression 2 each 0     order for DME Donut Pillow 1 each 0     metoclopramide (REGLAN) 10 MG tablet Take 1-2 tabs as needed       fluticasone (FLONASE) 50 MCG/ACT spray Spray 2 sprays in nostril daily 2 sprays in each nostril qd 1 Bottle 0     gabapentin (NEURONTIN) 100 MG capsule Take 1 capsule (100 mg) by mouth every evening as  needed 90 capsule 3     order for DME Equipment being ordered: Oral appliance for sleep apnea 1 Units 0     cycloSPORINE (RESTASIS) 0.05 % ophthalmic emulsion 1 drop 2 times daily       sitagliptin (JANUVIA) 50 MG tablet Take 50 mg by mouth daily       Cholecalciferol (VITAMIN D-3 PO) Take 2 tablets by mouth       BIOTIN PO Take 1,000 mcg by mouth       blood glucose monitoring (NO BRAND SPECIFIED) test strip Use to test blood sugar 1 times daily or as directed. 1 Box 6     nystatin-triamcinolone (MYCOLOG II) cream Apply topically daily as needed       ascorbic acid (VITAMIN C) 1000 MG TABS Take 1,000 mg by mouth daily       ondansetron (ZOFRAN) 4 MG tablet Take 1 tablet by mouth every 6 hours as needed Reported on 3/20/2017       aspirin (SB LOW DOSE ASA EC) 81 MG EC tablet Take 81 mg by mouth daily       nystatin (MYCOSTATIN) cream Apply topically daily as needed        levothyroxine (SYNTHROID) 112 MCG tablet Take 112 mcg by mouth daily Take 112 mcg daily except for Friday takes an additional 56 mcg.  Brand name Synthroid       diphenhydrAMINE-acetaminophen (TYLENOL PM)  MG tablet Take 1 tablet by mouth At Bedtime Reported on 3/20/2017       fexofenadine (ALLEGRA) 180 MG tablet Take 180 mg by mouth daily. 120 0     MULTIVITAMINS OR TABS ONE DAILY 100 3       ALLERGIES     Allergies   Allergen Reactions     Nsaids Difficulty breathing     Increased creatinine     Toradol Difficulty breathing     Shortness of breath     Celecoxib Itching and Rash     Codeine Itching     With higher doses     No Clinical Screening - See Comments Itching     Fragrance     Vioxx Other (See Comments)     Heart races     Conjugated Estrogens Rash     Sulfa Drugs Rash       PAST MEDICAL HISTORY:  Past Medical History:   Diagnosis Date     Abdominal adhesions 1984, 96,99    s/p lysis     Allergic rhinitis, cause unspecified      Carotid stenosis      CPAP (continuous positive airway pressure) dependence      Diabetic gastroparesis  (H)      Diet-controlled type 2 diabetes mellitus (H)      DVT of axillary vein, acute right (H)      Fibromyalgia      Gastro-oesophageal reflux disease      Hernia of unspecified site of abdominal cavity without mention of obstruction or gangrene     bilateral     HTN (hypertension)      Hypertriglyceridemia      Obstructive sleep apnea      ANNETTE (obstructive sleep apnea)      Papillary carcinoma of thyroid (H)     s/p thyroidectomy - Ruegemer     PE (pulmonary embolism)      Poliomyelitis     poor circulation right leg     Postsurgical hypothyroidism     s/p papillary thryoid cancer - Ruegemer     Pulmonary embolism (H)      Rosacea      S/P carpal tunnel release     bilateral     S/P hardware removal 01/2014    still with lingering foot pain     S/P shoulder surgery     bilateral     Septic joint (H)     right knee     Venous insufficiency      Venous thrombosis 1999    right axillary vein       PAST SURGICAL HISTORY:  Past Surgical History:   Procedure Laterality Date     AMPUTATE TOE(S)  3/15/2012    Procedure:AMPUTATE TOE(S); Surgeon:RUBEN MOSES; Location: OR     APPENDECTOMY  1972     ARTHRODESIS FOOT  3/15/2012    Procedure:ARTHRODESIS FOOT; RIGHT TRIPLE ARTHRODESIS, FIFTH TOE AMPUTATION, LATERAL LIGAMENT RECONSTRUCTION, TENDON TRANSFER AND RELEASE [MINI C-ARM, ARTHREX 4.5 AND 6.7 STAPLES, BIOCOMPOSITE TENODESIS SCREWS]; Surgeon:RUBEN MOSES; Location: OR      FREEING BOWEL ADHESION,ENTEROLYSIS      1986, 1996, 1999     C NONSPECIFIC PROCEDURE      throidectomy     C TOTAL ABDOM HYSTERECTOMY  1980    + BSO     CHOLECYSTECTOMY       COLOSTOMY  2/7/2012    Procedure:COLOSTOMY; CREATION OF SIGMOID COLOSTOMY AND EXTENSIVE  LYSIS OF ADHESIONS; Surgeon:MONTSERRAT BENDER P; Location: OR     GI SURGERY      weakened rectal sphincter with artificial stimulator     LAPAROTOMY, LYSIS ADHESIONS, COMBINED  2/7/2012    Procedure:COMBINED LAPAROTOMY, LYSIS ADHESIONS; Surgeon:MONTSERRAT BENDER  P; Location:[facility] OR     RELEASE TENDON FOOT  3/15/2012    Procedure:RELEASE TENDON FOOT; Surgeon:SUKHDEEP METZ; Location:[facility] OR     REMOVE HARDWARE FOOT  12/13/2012    Procedure: REMOVE HARDWARE FOOT;  RIGHT FOOT REMOVAL OF HARDWARE;  Surgeon: Sukhdeep Metz MD;  Location: [facility] OR       FAMILY HISTORY:  Family History   Problem Relation Age of Onset     Arthritis Mother      Hypertension Mother      CEREBROVASCULAR DISEASE Mother      Obesity Mother      HEART DISEASE Mother      MI's     Hypertension Father      Respiratory Father      Adult RDS     DIABETES Father      adult     Arthritis Sister      CANCER Sister      Arthritis Sister      Hypertension Sister      CANCER Sister      colon polup     HEART DISEASE Brother      MI at 54     Hypertension Sister      Lipids Brother      Hypertension Brother      Lipids Sister      Obesity Sister      Obesity Maternal Grandmother      Skin Cancer Maternal Grandmother      skin cancer unknown     CANCER Maternal Grandmother      unknown skin cancer on face       SOCIAL HISTORY:  Social History     Social History     Marital status:      Spouse name: N/A     Number of children: N/A     Years of education: N/A     Social History Main Topics     Smoking status: Never Smoker     Smokeless tobacco: Never Used     Alcohol use No      Comment: very rare     Drug use: No     Sexual activity: Not Currently     Partners: Male     Birth control/ protection: Female Surgical     Other Topics Concern     Caffeine Concern Yes     occ     Sleep Concern Yes     Stress Concern Yes     Special Diet Yes     low carb, low sugar      Exercise No     occ. 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[employer]        Review of Systems:  Skin:  Negative       Eyes:  Positive for glasses    ENT:  Negative      Respiratory:  Negative       Cardiovascular:  Negative      Gastroenterology:  "Positive for reflux;nausea;abdominal pain    Genitourinary:  not assessed      Musculoskeletal:  Positive for joint pain;arthritis;back pain    Neurologic:  Positive for stroke;migraine headaches    Psychiatric:  Negative      Heme/Lymph/Imm:  Positive for allergies    Endocrine:  Positive for thyroid disorder;diabetes      Physical Exam:  Vitals: /70  Pulse 63  Ht 1.562 m (5' 1.5\")  Wt 91.2 kg (201 lb)  BMI 37.36 kg/m2    Constitutional:  cooperative, alert and oriented, well developed, well nourished, in no acute distress        Skin:  warm and dry to the touch          Head:  normocephalic        Eyes:  sclera white        Lymph:      ENT:  no pallor or cyanosis        Neck:  no stiffness        Respiratory:  normal breath sounds, clear to auscultation, normal A-P diameter, normal symmetry, normal respiratory excursion, no use of accessory muscles         Cardiac: regular rhythm;normal S1 and S2                                                         GI:           Extremities and Muscular Skeletal:      bilateral LE edema;trace     right ankle brace with compression stocking d/t polio, stasis pigmentation of the LLE    Neurological:  no gross motor deficits        Psych:  Alert and Oriented x 3        CC  Leelee Rudolph, APRN CNP  [id number]  [place] [address]              "

## 2018-01-10 NOTE — MR AVS SNAPSHOT
"              After Visit Summary   1/10/2018    Chasity Hodgson    MRN: [id number]           Patient Information     Date Of Birth          1945        Visit Information        Provider Department      1/10/2018 12:30 PM Leelee Rudolph APRN [facility]        Today's Diagnoses     Atypical chest pain        Benign essential hypertension          Care Instructions    Start amlodipine/norvasc 5 mg daily for your blood pressure.    Continue to write down your blood pressure.    Check a lab Friday Jan 19 to make sure your renal function is okay after the CT abdomen/pelvis.    We will do a CT coronary angiogram after the lab.    See me for follow up of the CT and the blood pressure.    Leatha  [phone]          Follow-ups after your visit        Your next 10 appointments already scheduled     Regulo 10, 2018  2:00 PM CST   (Arrive by 1:45 PM)   MA DIAGNOSTIC LEFT W/ TOM with SHBCMA3   [facility] [place] ([facility])    [address] [place] [address], [address]  [facility] [id number] [phone]           Do not use any powder, lotion or deodorant under your arms or on your breast. If you do, we will ask you to remove it before your exam.  Wear comfortable, two-piece clothing.  If you have any allergies, tell your care team.  Bring any previous mammograms from other facilities or have them mailed to the breast center.  Three-dimensional (3D) mammograms are available at [place] locations in [facility], [place], [place], [facility], [place], [place], and [place]. [facility] locations include [place] [facility] in [place]. Benefits of 3D mammograms include: - Improved rate of cancer detection - Decreases your chance of having to go back for more tests, which means fewer: - \"False-positive\" results (This means that there is an abnormal area but it isn't cancer.) - Invasive testing procedures, such as " a biopsy or surgery - Can provide clearer images of the breast if you have dense breast tissue. 3D mammography is an optional exam that anyone can have with a 2D mammogram. It doesn't replace or take the place of a 2D mammogram. 2D mammograms remain an effective screening test for all women.  Not all insurance companies cover the cost of a 3D mammogram. Check with your insurance.            Regulo 10, 2018  2:30 PM CST   US BREAST LEFT LIMITED 1-3 QUAD with SHBCUS1   [facility] ([facility])    [address], [address]  [facility] [address]   [phone]           Please bring a list of your medicines (including vitamins, minerals and over-the-counter drugs). Also, tell your doctor about any allergies you may have. Wear comfortable clothes and leave your valuables at home.  You do not need to do anything special to prepare for your exam.  Please call the Imaging Department at your exam site with any questions.            Jan 11, 2018  4:00 PM CST   (Arrive by 3:45 PM)   CT ABDOMEN PELVIS W CONTRAST with SHCT2   [facility] ([facility])    [address] [facility] [address]   [phone]           Please bring any scans or X-rays taken at other hospitals, if similar tests were done. Also bring a list of your medicines, including vitamins, minerals and over-the-counter drugs. It is safest to leave personal items at home.  Be sure to tell your doctor:   If you have any allergies.   If there s any chance you are pregnant.   If you are breastfeeding.   If you have any special needs.  You may have contrast for this exam. To prepare:   Do not eat or drink for 2 hours before your exam. If you need to take medicine, you may take it with small sips of water. (We may ask you to take liquid medicine as well.)   The day before your exam, drink extra fluids at least six 8-ounce glasses (unless your doctor tells you to restrict your fluids).   Patients over 70 or patients with diabetes or kidney problems:   If you haven t had a blood test (creatinine test) within the last 30 days, go to your clinic or Diagnostic Imaging Department for this test.  If you have diabetes:   If your kidney function is normal, continue taking your metformin (Avandamet, Glucophage, Glucovance, Metaglip) on the day of your exam.   If your kidney function is abnormal, wait 48 hours before restarting this medicine.  You will have oral contrast for this exam:   You will drink the contrast at home. Get this from your clinic or Diagnostic Imaging Department. Please follow the directions given.  Please wear loose clothing, such as a sweat suit or jogging clothes. Avoid snaps, zippers and other metal. We may ask you to undress and put on a hospital gown.  If you have any questions, please call the Imaging Department where you will have your exam.            Feb 12, 2018 10:30 AM CST   Pre-Op physical with Shola Benoit MD   [facility]    [address] [address] [phone]            Mar 12, 2018 10:30 AM CDT   (Arrive by 10:15 AM)   RETURN HAIRLOSS with Renetta Meyer MD   [facility] ([facility])    [address] [place] [address] [phone]              Future tests that were ordered for you today     Open Future Orders        Priority Expected Expires Ordered    CT Angiogram coronary artery Routine 1/11/2018 1/10/2019 1/10/2018    Basic metabolic panel Routine 1/19/2018 1/10/2019 1/10/2018    CT Abdomen Pelvis w Contrast Routine  1/9/2019 1/9/2018            Who to contact     If you have questions or need follow up information about today's clinic visit or your schedule please contact [facility] directly at [phone].  Normal or non-critical lab and imaging results will be communicated to you by  "MyChart, letter or phone within 4 business days after the clinic has received the results. If you do not hear from us within 7 days, please contact the clinic through yoonewhart or phone. If you have a critical or abnormal lab result, we will notify you by phone as soon as possible.  Submit refill requests through [employer] or call your pharmacy and they will forward the refill request to us. Please allow 3 business days for your refill to be completed.          Additional Information About Your Visit        [employer] Information     [employer] gives you secure access to your electronic health record. If you see a primary care provider, you can also send messages to your care team and make appointments. If you have questions, please call your primary care clinic.  If you do not have a primary care provider, please call [phone] and they will assist you.        Care EveryWhere ID     This is your Care EveryWhere ID. This could be used by other organizations to access your [facility] medical records  [id number]        Your Vitals Were     Pulse Height BMI (Body Mass Index)             63 1.562 m (5' 1.5\") 37.36 kg/m2          Blood Pressure from Last 3 Encounters:   01/10/18 172/70   01/09/18 142/80   01/02/18 144/56    Weight from Last 3 Encounters:   01/10/18 91.2 kg (201 lb)   01/02/18 92.1 kg (203 lb)   12/20/17 92.1 kg (203 lb)              We Performed the Following     Follow-Up with Cardiac Advanced Practice Provider          Today's Medication Changes          These changes are accurate as of: 1/10/18  1:08 PM.  If you have any questions, ask your nurse or doctor.               Start taking these medicines.        Dose/Directions    amLODIPine 5 MG tablet   Commonly known as:  NORVASC   Used for:  Benign essential hypertension   Started by:  Leelee Rudolph APRN CNP        Dose:  5 mg   Take 1 tablet (5 mg) by mouth daily   Quantity:  30 tablet   Refills:  3            Where to get your medicines    "   These medications were sent to [facility] [place] - [address]  [address] [facility] [address]     Phone:  [phone]     amLODIPine 5 MG tablet                Primary Care Provider Office Phone # Fax #    Shola Benoit [phone] [address] [facility] [address]  [place] [address]        Equal Access to Services     CORINNE ROBB : Hadii aad ku hadasho Soomaali, waaxda luqadaha, qaybta kaalmada adeegyada, waxay idiin hayaan adeeg kharash la'licon . So [facility] [phone].    ATENCIÓN: Si habla español, tiene a modi disposición servicios gratuitos de asistencia lingüística. Llame al [phone].    We comply with applicable federal civil rights laws and [place] laws. We do not discriminate on the basis of race, color, national origin, age, disability, sex, sexual orientation, or gender identity.            Thank you!     Thank you for choosing [facility]  for your care. Our goal is always to provide you with excellent care. Hearing back from our patients is one way we can continue to improve our services. Please take a few minutes to complete the written survey that you may receive in the mail after your visit with us. Thank you!             Your Updated Medication List - Protect others around you: Learn how to safely use, store and throw away your medicines at www.disposemymeds.org.          This list is accurate as of: 1/10/18  1:08 PM.  Always use your most recent med list.                   Brand Name Dispense Instructions for use Diagnosis    amLODIPine 5 MG tablet    NORVASC    30 tablet    Take 1 tablet (5 mg) by mouth daily    Benign essential hypertension       ascorbic acid 1000 MG Tabs    vitamin C     Take 1,000 mg by mouth daily        ASTEPRO NA           atenolol 50 MG tablet    TENORMIN    180 tablet    Take 1 tablet (50 mg) by mouth 2 times daily    Essential hypertension       BIOTIN PO       Take 1,000 mcg by mouth        blood glucose monitoring test strip    no brand specified    1 Box    Use to test blood sugar 1 times daily or as directed.    Type 2 diabetes mellitus with other specified complication (H)       clotrimazole 1 % cream    LOTRIMIN     Apply topically daily        cycloSPORINE 0.05 % ophthalmic emulsion    RESTASIS     1 drop 2 times daily        diphenhydrAMINE-acetaminophen  MG tablet    TYLENOL PM     Take 1 tablet by mouth At Bedtime Reported on 3/20/2017        famotidine 40 MG tablet    PEPCID     Take 40 mg by mouth At Bedtime        fenofibrate 145 MG tablet     90 tablet    Take 1 tablet (145 mg) by mouth daily    CARDIOVASCULAR SCREENING; LDL GOAL LESS THAN 130       fexofenadine 180 MG tablet    ALLEGRA    120    Take 180 mg by mouth daily.        fluticasone 50 MCG/ACT spray    FLONASE    1 Bottle    Spray 2 sprays in nostril daily 2 sprays in each nostril qd    Seasonal allergic rhinitis due to other allergic trigger       gabapentin 100 MG capsule    NEURONTIN    90 capsule    Take 1 capsule (100 mg) by mouth every evening as needed    Poliomyelitis       hydrochlorothiazide 25 MG tablet    HYDRODIURIL    90 tablet    Take 1 tablet (25 mg) by mouth daily    Essential hypertension       lisinopril 20 MG tablet    PRINIVIL/ZESTRIL    90 tablet    Take 1 tablet (20 mg) by mouth daily    Essential hypertension       metFORMIN 500 MG tablet    GLUCOPHAGE     Take 2 tablets (1,000 mg) by mouth daily (with dinner)        metoclopramide 10 MG tablet    REGLAN     Take 1-2 tabs as needed        multivitamin per tablet     100    ONE DAILY        nitroGLYcerin 0.4 MG sublingual tablet    NITROSTAT    25 tablet    Place 1 tablet (0.4 mg) under the tongue every 5 minutes as needed for chest pain (no more than 3 in one hour; after 3rd, call 911.)    Atypical chest pain       nystatin cream    MYCOSTATIN     Apply topically daily as needed        nystatin-triamcinolone cream     MYCOLOG II     Apply topically daily as needed        ondansetron 4 MG tablet    ZOFRAN     Take 1 tablet by mouth every 6 hours as needed Reported on 3/20/2017        * order for DME     1 Units    Equipment being ordered: Oral appliance for sleep apnea    ANNETTE (obstructive sleep apnea)       * order for DME     1 each    Donut Pillow    Coccydynia       * order for DME     2 each    Equipment being ordered: Compression stockings - Knee High; 20-30 mmHg compression    Insufficiency, arterial, peripheral (H)       pantoprazole 40 MG EC tablet    PROTONIX     Take 40 mg by mouth 2 times daily        SB LOW DOSE ASA EC 81 MG EC tablet   Generic drug:  aspirin      Take 81 mg by mouth daily        sitagliptin 50 MG tablet    JANUVIA     Take 50 mg by mouth daily        SYNTHROID 112 MCG tablet   Generic drug:  levothyroxine      Take 112 mcg by mouth daily Take 112 mcg daily except for Friday takes an additional 56 mcg.  Brand name Synthroid        TOUJEO SOLOSTAR 300 UNIT/ML injection   Generic drug:  insulin glargine U-300      Inject Subcutaneous every morning        triamcinolone 0.1 % cream    KENALOG     Apply topically daily        VITAMIN D-3 PO      Take 2 tablets by mouth        * Notice:  This list has 3 medication(s) that are the same as other medications prescribed for you. Read the directions carefully, and ask your doctor or other care provider to review them with you.

## 2018-01-10 NOTE — PATIENT INSTRUCTIONS
Start amlodipine/norvasc 5 mg daily for your blood pressure.    Continue to write down your blood pressure.    Check a lab Friday Jan 19 to make sure your renal function is okay after the CT abdomen/pelvis.    We will do a CT coronary angiogram after the lab.    See me for follow up of the CT and the blood pressure.    Leatha  [phone]

## 2018-01-10 NOTE — LETTER
1/10/2018    Shola Benoit MD, MD  [address]  [facility] [address]    RE: Chasity Hodgson       Dear Colleague,    I had the pleasure of seeing Chasity Hodgson in the [facility].    HPI and Plan:   I had the pleasure of seeing Chasity Rodriguez today in cardiology clinic follow-up.  She is a pleasant 72-year-old patient of Dr. Ortiz.  She has a history of atypical chest discomfort, hypertension and lower extremity edema.  She has a history of GERD, a year ago she had dilated esophageal strictures, she takes Protonix, and more recently her GI doctor switched her ranitidine to Lodine.  She has hypertension and carotid disease. She has a history of childhood polio for which she wears a right ankle brace.  She recently saw Dr. Ortiz for her atypical chest discomfort, I saw her in follow up and asked her to closely monitor her symptoms and come back today. She continues to work for the [employer].     In 2011 CT coronary angiography demonstrated the absence of obstructive coronary disease.  Her last nuclear stress test in 2014 showed no evidence of ischemia or infarct, she had normal LV systolic function.  She does have peripheral artery disease based on an evaluation done by Dr. Holliday in 2016.  She wore a Holter monitor which previously demonstrated a normal sinus rhythm with average heart rate variability and isolated PVCs.     Recently Chasity saw Dr. Ortiz after an episode of atypical chest chest discomfort after a stressful event.  Dr. Ortiz felt it was less likely to represent coronary ischemia given her recent stress echocardiogram where she exercised for 4 minutes and reach 71% of her age-predicted heart rate without evidence of ischemia or reproduction of chest discomfort.  For this reason she Dr. Ortiz decided to take a little bit of a watch and see approach, and if she has recurrent symptoms to try nitro to see if it alleviated them.  I met her in December she  continued to have atypical symptoms sometimes at night, sometimes when she is driving occurring about 4 times a week, she wasn't able to really try nitroglycerin and doesn't know if that would've helped.  She was borderline hypertensive at her last visit.  I asked her to monitor her symptoms and keep track of her blood pressure and how often she is having these atypical chest symptoms.    Today she brings with her some notes of her symptoms.  It turns out she's been having quite a bit of abdominal and lower back symptoms, tomorrow she will have a CT.  She has noticed chest discomfort in her left breast, this can happen at rest and with exertion.  It did occur on one occasion and she used a sublingual nitroglycerin which did seem to help, though she wasn't sure if the symptoms would've gone away on their own or not.  She's been experiencing some headaches the last couple of days, on one such occasion her systolic blood pressure was 160.  Her home blood pressures however have been quite high in the 170s to 180s.     Physical exam  Please see below     Assessment and plan.  1.                   Atypical chest discomfort.   She continues to have these symptoms, at times it appears that nitro helps them.  For this reason we will go ahead and repeat her CT coronary angiogram.  Because she is having a CT abdomen and pelvis tomorrow, I will have her check her BMP on January 19, if her renal function is stable at that time and the following Monday she can go ahead and have her CTA.  2.                   Hypertension.  Her blood pressure is definitely elevated today in clinic at 172/70 and also by her home records.  She is not currently having a headache.  She is on atenolol 50 b.i.d., lisinopril 20, hydrochlorothiazide 25 mg daily.  I have gone ahead and added amlodipine 5 mg.  She may develop some lower extremity edema from this, we will have to see how well she can tolerate it.     I will see Chasity in follow-up of her CTA  and blood pressure changes.  Leatha Rudolph APRN, CNP        Orders Placed This Encounter   Procedures     CT Angiogram coronary artery     Basic metabolic panel       Orders Placed This Encounter   Medications     amLODIPine (NORVASC) 5 MG tablet     Sig: Take 1 tablet (5 mg) by mouth daily     Dispense:  30 tablet     Refill:  3       There are no discontinued medications.      Encounter Diagnoses   Name Primary?     Atypical chest pain      Benign essential hypertension        CURRENT MEDICATIONS:  Current Outpatient Prescriptions   Medication Sig Dispense Refill     amLODIPine (NORVASC) 5 MG tablet Take 1 tablet (5 mg) by mouth daily 30 tablet 3     metFORMIN (GLUCOPHAGE) 500 MG tablet Take 2 tablets (1,000 mg) by mouth daily (with dinner)       hydrochlorothiazide (HYDRODIURIL) 25 MG tablet Take 1 tablet (25 mg) by mouth daily 90 tablet 3     pantoprazole (PROTONIX) 40 MG EC tablet Take 40 mg by mouth 2 times daily       insulin glargine U-300 (TOUJEO SOLOSTAR) 300 UNIT/ML injection Inject Subcutaneous every morning       famotidine (PEPCID) 40 MG tablet Take 40 mg by mouth At Bedtime       Azelastine HCl (ASTEPRO NA)        triamcinolone (KENALOG) 0.1 % cream Apply topically daily       clotrimazole (LOTRIMIN) 1 % cream Apply topically daily       nitroGLYcerin (NITROSTAT) 0.4 MG sublingual tablet Place 1 tablet (0.4 mg) under the tongue every 5 minutes as needed for chest pain (no more than 3 in one hour; after 3rd, call 911.) 25 tablet 3     atenolol (TENORMIN) 50 MG tablet Take 1 tablet (50 mg) by mouth 2 times daily 180 tablet 0     lisinopril (PRINIVIL/ZESTRIL) 20 MG tablet Take 1 tablet (20 mg) by mouth daily 90 tablet 2     fenofibrate 145 MG tablet Take 1 tablet (145 mg) by mouth daily 90 tablet 2     order for DME Equipment being ordered: Compression stockings - Knee High; 20-30 mmHg compression 2 each 0     order for DME Donut Pillow 1 each 0     metoclopramide (REGLAN) 10 MG tablet Take 1-2 tabs  as needed       fluticasone (FLONASE) 50 MCG/ACT spray Spray 2 sprays in nostril daily 2 sprays in each nostril qd 1 Bottle 0     gabapentin (NEURONTIN) 100 MG capsule Take 1 capsule (100 mg) by mouth every evening as needed 90 capsule 3     order for DME Equipment being ordered: Oral appliance for sleep apnea 1 Units 0     cycloSPORINE (RESTASIS) 0.05 % ophthalmic emulsion 1 drop 2 times daily       sitagliptin (JANUVIA) 50 MG tablet Take 50 mg by mouth daily       Cholecalciferol (VITAMIN D-3 PO) Take 2 tablets by mouth       BIOTIN PO Take 1,000 mcg by mouth       blood glucose monitoring (NO BRAND SPECIFIED) test strip Use to test blood sugar 1 times daily or as directed. 1 Box 6     nystatin-triamcinolone (MYCOLOG II) cream Apply topically daily as needed       ascorbic acid (VITAMIN C) 1000 MG TABS Take 1,000 mg by mouth daily       ondansetron (ZOFRAN) 4 MG tablet Take 1 tablet by mouth every 6 hours as needed Reported on 3/20/2017       aspirin (SB LOW DOSE ASA EC) 81 MG EC tablet Take 81 mg by mouth daily       nystatin (MYCOSTATIN) cream Apply topically daily as needed        levothyroxine (SYNTHROID) 112 MCG tablet Take 112 mcg by mouth daily Take 112 mcg daily except for Friday takes an additional 56 mcg.  Brand name Synthroid       diphenhydrAMINE-acetaminophen (TYLENOL PM)  MG tablet Take 1 tablet by mouth At Bedtime Reported on 3/20/2017       fexofenadine (ALLEGRA) 180 MG tablet Take 180 mg by mouth daily. 120 0     MULTIVITAMINS OR TABS ONE DAILY 100 3       ALLERGIES     Allergies   Allergen Reactions     Nsaids Difficulty breathing     Increased creatinine     Toradol Difficulty breathing     Shortness of breath     Celecoxib Itching and Rash     Codeine Itching     With higher doses     No Clinical Screening - See Comments Itching     Fragrance     Vioxx Other (See Comments)     Heart races     Conjugated Estrogens Rash     Sulfa Drugs Rash       PAST MEDICAL HISTORY:  Past Medical History:    Diagnosis Date     Abdominal adhesions 1984, 96,99    s/p lysis     Allergic rhinitis, cause unspecified      Carotid stenosis      CPAP (continuous positive airway pressure) dependence      Diabetic gastroparesis (H)      Diet-controlled type 2 diabetes mellitus (H)      DVT of axillary vein, acute right (H)      Fibromyalgia      Gastro-oesophageal reflux disease      Hernia of unspecified site of abdominal cavity without mention of obstruction or gangrene     bilateral     HTN (hypertension)      Hypertriglyceridemia      Obstructive sleep apnea      ANNETTE (obstructive sleep apnea)      Papillary carcinoma of thyroid (H)     s/p thyroidectomy - Ruegemer     PE (pulmonary embolism)      Poliomyelitis     poor circulation right leg     Postsurgical hypothyroidism     s/p papillary thryoid cancer - Ruegemer     Pulmonary embolism (H)      Rosacea      S/P carpal tunnel release     bilateral     S/P hardware removal 01/2014    still with lingering foot pain     S/P shoulder surgery     bilateral     Septic joint (H)     right knee     Venous insufficiency      Venous thrombosis 1999    right axillary vein       PAST SURGICAL HISTORY:  Past Surgical History:   Procedure Laterality Date     AMPUTATE TOE(S)  3/15/2012    Procedure:AMPUTATE TOE(S); Surgeon:RUBEN MOSES; Location: OR     APPENDECTOMY  1972     ARTHRODESIS FOOT  3/15/2012    Procedure:ARTHRODESIS FOOT; RIGHT TRIPLE ARTHRODESIS, FIFTH TOE AMPUTATION, LATERAL LIGAMENT RECONSTRUCTION, TENDON TRANSFER AND RELEASE [MINI C-ARM, ARTHREX 4.5 AND 6.7 STAPLES, BIOCOMPOSITE TENODESIS SCREWS]; Surgeon:RUBEN MOSES; Location: OR      FREEING BOWEL ADHESION,ENTEROLYSIS      1986, 1996, 1999     C NONSPECIFIC PROCEDURE      throidectomy     C TOTAL ABDOM HYSTERECTOMY  1980    + BSO     CHOLECYSTECTOMY       COLOSTOMY  2/7/2012    Procedure:COLOSTOMY; CREATION OF SIGMOID COLOSTOMY AND EXTENSIVE  LYSIS OF ADHESIONS; Surgeon:MONTSERRAT BENDER;  Location:[facility] OR     GI SURGERY      weakened rectal sphincter with artificial stimulator     LAPAROTOMY, LYSIS ADHESIONS, COMBINED  2/7/2012    Procedure:COMBINED LAPAROTOMY, LYSIS ADHESIONS; Surgeon:MONTSERRAT BENDER; Location:[facility] OR     RELEASE TENDON FOOT  3/15/2012    Procedure:RELEASE TENDON FOOT; Surgeon:SUKHDEEP METZ; Location:[facility] OR     REMOVE HARDWARE FOOT  12/13/2012    Procedure: REMOVE HARDWARE FOOT;  RIGHT FOOT REMOVAL OF HARDWARE;  Surgeon: Sukhdeep Metz MD;  Location: [facility] OR       FAMILY HISTORY:  Family History   Problem Relation Age of Onset     Arthritis Mother      Hypertension Mother      CEREBROVASCULAR DISEASE Mother      Obesity Mother      HEART DISEASE Mother      MI's     Hypertension Father      Respiratory Father      Adult RDS     DIABETES Father      adult     Arthritis Sister      CANCER Sister      Arthritis Sister      Hypertension Sister      CANCER Sister      colon polup     HEART DISEASE Brother      MI at 54     Hypertension Sister      Lipids Brother      Hypertension Brother      Lipids Sister      Obesity Sister      Obesity Maternal Grandmother      Skin Cancer Maternal Grandmother      skin cancer unknown     CANCER Maternal Grandmother      unknown skin cancer on face       SOCIAL HISTORY:  Social History     Social History     Marital status:      Spouse name: N/A     Number of children: N/A     Years of education: N/A     Social History Main Topics     Smoking status: Never Smoker     Smokeless tobacco: Never Used     Alcohol use No      Comment: very rare     Drug use: No     Sexual activity: Not Currently     Partners: Male     Birth control/ protection: Female Surgical     Other Topics Concern     Caffeine Concern Yes     occ     Sleep Concern Yes     Stress Concern Yes     Special Diet Yes     low carb, low sugar      Exercise No     occ. 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[employer]        Review of Systems:  Skin:  Negative       Eyes:  Positive for glasses    ENT:  Negative      Respiratory:  Negative       Cardiovascular:  Negative      Gastroenterology: Positive for reflux;nausea;abdominal pain    Genitourinary:  not assessed      Musculoskeletal:  Positive for joint pain;arthritis;back pain    Neurologic:  Positive for stroke;migraine headaches    Psychiatric:  Negative      Heme/Lymph/Imm:  Positive for allergies    Endocrine:  Positive for thyroid disorder;diabetes      Physical Exam:  Vitals: /70  Pulse 63  Ht 1.562 m (5' 1.5\")  Wt 91.2 kg (201 lb)  BMI 37.36 kg/m2    Constitutional:  cooperative, alert and oriented, well developed, well nourished, in no acute distress        Skin:  warm and dry to the touch          Head:  normocephalic        Eyes:  sclera white        Lymph:      ENT:  no pallor or cyanosis        Neck:  no stiffness        Respiratory:  normal breath sounds, clear to auscultation, normal A-P diameter, normal symmetry, normal respiratory excursion, no use of accessory muscles         Cardiac: regular rhythm;normal S1 and S2                                                         GI:           Extremities and Muscular Skeletal:      bilateral LE edema;trace     right ankle brace with compression stocking d/t polio, stasis pigmentation of the LLE    Neurological:  no gross motor deficits        Psych:  Alert and Oriented x 3        Thank you for allowing me to participate in the care of your patient.    Sincerely,     MAGALY Morgan [facility]    "

## 2018-01-11 ENCOUNTER — HOSPITAL ENCOUNTER (OUTPATIENT)
Dept: CT IMAGING | Facility: CLINIC | Age: 73
Discharge: HOME | End: 2018-01-11
Attending: INTERNAL MEDICINE | Admitting: INTERNAL MEDICINE
Payer: MEDICARE

## 2018-01-11 DIAGNOSIS — R10.31 ABDOMINAL PAIN, RIGHT LOWER QUADRANT: ICD-10-CM

## 2018-01-11 LAB
CREAT BLD-MCNC: 1.4 MG/DL (ref 0.52–1.04)
GFR SERPL CREATININE-BSD FRML MDRD: 37 ML/MIN/1.7M2

## 2018-01-11 PROCEDURE — 82565 ASSAY OF CREATININE: CPT

## 2018-01-11 PROCEDURE — 25000128 H RX IP 250 OP 636: Performed by: INTERNAL MEDICINE

## 2018-01-11 PROCEDURE — 74177 CT ABD & PELVIS W/CONTRAST: CPT

## 2018-01-11 RX ORDER — IOPAMIDOL 755 MG/ML
97 INJECTION, SOLUTION INTRAVASCULAR ONCE
Status: COMPLETED | OUTPATIENT
Start: 2018-01-11 | End: 2018-01-11

## 2018-01-11 RX ADMIN — IOPAMIDOL 97 ML: 755 INJECTION, SOLUTION INTRAVENOUS at 16:29

## 2018-01-11 NOTE — PROGRESS NOTES
Gerard Cochran,    I have had the opportunity to review your recent results and an interpretation is as follows:  Your liver function tests returned within normal limits - we will await your upcoming CT    Sincerely,  Shola Benoit MD

## 2018-01-12 NOTE — PROGRESS NOTES
Gerard Cochran    I have had the opportunity to review your recent results and an interpretation is as follows:  Your CT was reviewed and there was no acute abnormality identified - we should follow up if symptoms persist.     Sincerely,  Shola Benoit MD

## 2018-01-19 ENCOUNTER — TELEPHONE (OUTPATIENT)
Dept: CARDIOLOGY | Facility: CLINIC | Age: 73
End: 2018-01-19

## 2018-01-19 DIAGNOSIS — I10 HTN (HYPERTENSION): Primary | ICD-10-CM

## 2018-01-19 DIAGNOSIS — R07.89 ATYPICAL CHEST PAIN: ICD-10-CM

## 2018-01-19 LAB
ANION GAP SERPL CALCULATED.3IONS-SCNC: 6 MMOL/L (ref 3–14)
BUN SERPL-MCNC: 30 MG/DL (ref 7–30)
CALCIUM SERPL-MCNC: 8.9 MG/DL (ref 8.5–10.1)
CHLORIDE SERPL-SCNC: 99 MMOL/L (ref 94–109)
CO2 SERPL-SCNC: 28 MMOL/L (ref 20–32)
CREAT SERPL-MCNC: 1.27 MG/DL (ref 0.52–1.04)
GFR SERPL CREATININE-BSD FRML MDRD: 41 ML/MIN/1.7M2
GLUCOSE SERPL-MCNC: 196 MG/DL (ref 70–99)
POTASSIUM SERPL-SCNC: 4.1 MMOL/L (ref 3.4–5.3)
SODIUM SERPL-SCNC: 133 MMOL/L (ref 133–144)

## 2018-01-19 PROCEDURE — 80048 BASIC METABOLIC PNL TOTAL CA: CPT | Performed by: NURSE PRACTITIONER

## 2018-01-19 PROCEDURE — 36415 COLL VENOUS BLD VENIPUNCTURE: CPT | Performed by: NURSE PRACTITIONER

## 2018-01-19 NOTE — TELEPHONE ENCOUNTER
Reviewed message below with Leatha NP. She would like to wait on CTA. She recommended patient to increase her fluid intake over the weekend and get a BMP next week.     Leatha also wanted to know if pt could tolerate the swelling in her hands and feet as her BP has improved with the Amlodipine and there are not many medications changes that can be made.     Spoke to patients  and reviewed Leatha's recommendations above. Pts  will inform patient to increase her fluid intake and that we are going to reschedule the CT. Writer will give patient a call on Monday 1/22/18 to schedule BMP, CT and review amlodipine swelling.

## 2018-01-19 NOTE — TELEPHONE ENCOUNTER
Received call from patient wondering the results of her BMP and if she should continue with the CTA Monday.     Pt also mentioned that with the addition of amlodipine 5 mg her BP has improved but she is noticing some swelling in her feet and hands. Pt wondering if she needs any additional diuretic like lasix   /61 HR 57   136/60, HR 65    127/58, HR 58 30 min after meds   148/57, HR 63 before meds   132/61, HR 59 after meds          1/19/18 BMP    Na K BUN Creat GFR   01/19/18 1054 133 4.1 30 1.27 41   01/11/18 1624 -- -- -- 1.4 37   02/15/17 0000 132 4.2 16 0.94 61       1/10/18 OV with Leatha NP   1.                   Atypical chest discomfort.  She continues to have these symptoms, at times it appears that nitro helps them.  For this reason we will go ahead and repeat her CT coronary angiogram.  Because she is having a CT abdomen and pelvis tomorrow, I will have her check her BMP on January 19, if her renal function is stable at that time and the following Monday she can go ahead and have her CTA.  2.                   Hypertension.  Her blood pressure is definitely elevated today in clinic at 172/70 and also by her home records.  She is not currently having a headache.  She is on atenolol 50 b.i.d., lisinopril 20, hydrochlorothiazide 25 mg daily.  I have gone ahead and added amlodipine 5 mg.  She may develop some lower extremity edema from this, we will have to see how well she can tolerate it.    Will route to [facility] to review and advise.

## 2018-01-22 NOTE — TELEPHONE ENCOUNTER
Spoke to patient regarding getting a BMP this week and then if that looks good doing the CTA. Pt stated that she would like to get CTA on the schedule in anticipation that her BMP has improved. Also informed patient that Leatha NP would like patient to get on Dr. Ortiz's wait list and cancel OV with Dr. Clark as it is not urgent and would prefer her to stay with her primary cardiologist. Pt stated that was fine.       Pt stated the swelling on her feet ankles and hands is bearable and can be discussed at OV 2/2/18 with Leatha NP. Pt will notify team 4 if it worsens.    Pt transferred to scheduled to schedule BMP and CTA. OV with Dr. Clark cancelled and pt placed on Dr. Mae wait list.

## 2018-01-23 ENCOUNTER — TRANSFERRED RECORDS (OUTPATIENT)
Dept: HEALTH INFORMATION MANAGEMENT | Facility: CLINIC | Age: 73
End: 2018-01-23

## 2018-01-25 DIAGNOSIS — I10 HTN (HYPERTENSION): ICD-10-CM

## 2018-01-25 DIAGNOSIS — A80.9 POLIOMYELITIS: ICD-10-CM

## 2018-01-25 LAB
ANION GAP SERPL CALCULATED.3IONS-SCNC: 12.7 MMOL/L (ref 6–17)
BUN SERPL-MCNC: 39 MG/DL (ref 7–30)
CALCIUM SERPL-MCNC: 9.8 MG/DL (ref 8.5–10.5)
CHLORIDE SERPL-SCNC: 103 MMOL/L (ref 98–107)
CO2 SERPL-SCNC: 29 MMOL/L (ref 23–29)
CREAT SERPL-MCNC: 1.54 MG/DL (ref 0.7–1.3)
GFR SERPL CREATININE-BSD FRML MDRD: 33 ML/MIN/1.7M2
GLUCOSE SERPL-MCNC: 129 MG/DL (ref 70–105)
POTASSIUM SERPL-SCNC: 4.7 MMOL/L (ref 3.5–5.1)
SODIUM SERPL-SCNC: 140 MMOL/L (ref 136–145)

## 2018-01-25 PROCEDURE — 80048 BASIC METABOLIC PNL TOTAL CA: CPT | Performed by: NURSE PRACTITIONER

## 2018-01-25 PROCEDURE — 36415 COLL VENOUS BLD VENIPUNCTURE: CPT | Performed by: NURSE PRACTITIONER

## 2018-01-25 NOTE — TELEPHONE ENCOUNTER
gabapentin (NEURONTIN) 100 MG capsule      Last Written Prescription Date:  2/6/17  Last Fill Quantity: 90 capsule,   # refills: 3  Last Office Visit: 1/9/18 Kaycee  Future Office visit:    Next 5 appointments (look out 90 days)     Feb 02, 2018 11:00 AM CST   Return Visit with MAGALY Ramos CNP   [facility] ([facility]    [address]  [place] [address] [phone]            Feb 12, 2018 10:30 AM CST   [facility]oit [facility]    [address] [id number] [phone]                   Routing refill request to provider for review/approval because:  Drug not on the [facility], [facility] or [facility] refill protocol or controlled substance

## 2018-01-26 ENCOUNTER — TELEPHONE (OUTPATIENT)
Dept: CARDIOLOGY | Facility: CLINIC | Age: 73
End: 2018-01-26

## 2018-01-26 DIAGNOSIS — I10 HTN (HYPERTENSION): Primary | ICD-10-CM

## 2018-01-26 RX ORDER — GABAPENTIN 100 MG/1
100 CAPSULE ORAL
Qty: 90 CAPSULE | Refills: 3 | Status: SHIPPED | OUTPATIENT
Start: 2018-01-26 | End: 2019-02-06

## 2018-01-26 NOTE — TELEPHONE ENCOUNTER
Notes Recorded by Leelee Rudolph, MAGALY CNP on 1/25/2018 at 5:26 PM  Pt's labs show her creatine continues to go up. Stop lisinopril, increase amolidpine to 5 (if she isn't having swelling from it) and see me in f/u with BMP. No CT for now. Thanks, Leatha    Lisinopril stopped however patient is already on amlodipine 5 mg daily. She adds that the swelling is horrible. Her feet are red, swollen and painful.  She was very tearful about cancelling the CT scan as she stated she does not want to push her surgery out any further. Explained to the patient the importance of stopping the lisinopril and not having the CT due to her creatinine and kidney function. She stated her understanding. BMP order placed and scheduled for 10:00 prior to her OV. Will route to [facility] to review.

## 2018-01-26 NOTE — TELEPHONE ENCOUNTER
Routing refill request to provider for review/approval because:  Drug not on the [facility] refill protocol   Please authorize if appropriate.  Thanks,  Kirsten Lovelace RN

## 2018-01-29 ENCOUNTER — TRANSFERRED RECORDS (OUTPATIENT)
Dept: HEALTH INFORMATION MANAGEMENT | Facility: CLINIC | Age: 73
End: 2018-01-29

## 2018-01-29 LAB
CHOLEST SERPL-MCNC: 185 MG/DL
CREAT SERPL-MCNC: 1.41 MG/DL (ref 0.6–0.93)
GFR SERPL CREATININE-BSD FRML MDRD: 37 ML/MIN/1.73M2
GLUCOSE SERPL-MCNC: 115 MG/DL (ref 65–99)
HBA1C MFR BLD: 6.7 % (ref 4–6)
HDLC SERPL-MCNC: 39 MG/DL
LDLC SERPL CALC-MCNC: 105 MG/DL
NONHDLC SERPL-MCNC: 146 MG/DL
POTASSIUM SERPL-SCNC: 4.6 MMOL/L (ref 3.5–5.3)
TRIGL SERPL-MCNC: 290 MG/DL

## 2018-02-02 ENCOUNTER — OFFICE VISIT (OUTPATIENT)
Dept: CARDIOLOGY | Facility: CLINIC | Age: 73
End: 2018-02-02
Payer: MEDICARE

## 2018-02-02 VITALS
WEIGHT: 203 LBS | SYSTOLIC BLOOD PRESSURE: 110 MMHG | HEART RATE: 64 BPM | HEIGHT: 62 IN | BODY MASS INDEX: 37.36 KG/M2 | DIASTOLIC BLOOD PRESSURE: 66 MMHG

## 2018-02-02 DIAGNOSIS — I10 HTN (HYPERTENSION): ICD-10-CM

## 2018-02-02 DIAGNOSIS — I73.9 INSUFFICIENCY, ARTERIAL, PERIPHERAL (H): ICD-10-CM

## 2018-02-02 DIAGNOSIS — R07.89 ATYPICAL CHEST PAIN: ICD-10-CM

## 2018-02-02 DIAGNOSIS — E78.2 MIXED HYPERLIPIDEMIA: ICD-10-CM

## 2018-02-02 DIAGNOSIS — I10 BENIGN ESSENTIAL HYPERTENSION: Primary | ICD-10-CM

## 2018-02-02 LAB
ANION GAP SERPL CALCULATED.3IONS-SCNC: 12.5 MMOL/L (ref 6–17)
BUN SERPL-MCNC: 29 MG/DL (ref 7–30)
CALCIUM SERPL-MCNC: 9.7 MG/DL (ref 8.5–10.5)
CHLORIDE SERPL-SCNC: 99 MMOL/L (ref 98–107)
CO2 SERPL-SCNC: 27 MMOL/L (ref 23–29)
CREAT SERPL-MCNC: 1.35 MG/DL (ref 0.7–1.3)
GFR SERPL CREATININE-BSD FRML MDRD: 39 ML/MIN/1.7M2
GLUCOSE SERPL-MCNC: 144 MG/DL (ref 70–105)
POTASSIUM SERPL-SCNC: 4.5 MMOL/L (ref 3.5–5.1)
SODIUM SERPL-SCNC: 134 MMOL/L (ref 136–145)

## 2018-02-02 PROCEDURE — 80048 BASIC METABOLIC PNL TOTAL CA: CPT | Performed by: NURSE PRACTITIONER

## 2018-02-02 PROCEDURE — 36415 COLL VENOUS BLD VENIPUNCTURE: CPT | Performed by: NURSE PRACTITIONER

## 2018-02-02 PROCEDURE — 99214 OFFICE O/P EST MOD 30 MIN: CPT | Performed by: NURSE PRACTITIONER

## 2018-02-02 RX ORDER — AMLODIPINE BESYLATE 5 MG/1
2.5 TABLET ORAL DAILY
Qty: 30 TABLET | Refills: 3 | COMMUNITY
Start: 2018-02-02 | End: 2018-02-12

## 2018-02-02 NOTE — PROGRESS NOTES
HPI and Plan:   I had the pleasure of seeing Chasity Hodgson today in cardiology clinic follow-up.  She is a pleasant 72-year-old patient of Dr. Ortiz.  She has a history of atypical chest discomfort, hypertension and lower extremity edema and type 2 diabetes.  She has a history of GERD, a year ago she had dilated esophageal strictures, she takes Protonix, and more recently her GI doctor switched her ranitidine to Lodine.  She has hypertension and carotid disease. She has a history of childhood polio for which she wears a right ankle brace.  She recently saw Dr. Ortiz for her atypical chest discomfort, I saw her in follow up and asked her to closely monitor her symptoms and come back today. She continues to work for the [employer].      In 2011 CT coronary angiography demonstrated the absence of obstructive coronary disease.  Her last nuclear stress test in 2014 showed no evidence of ischemia or infarct, she had normal LV systolic function.  She does have peripheral artery disease based on an evaluation done by Dr. Holliday in 2016.  She wore a Holter monitor which previously demonstrated a normal sinus rhythm with average heart rate variability and isolated PVCs.     Chasity saw Dr. Ortiz in November after an episode of atypical chest chest discomfort after a stressful event.  Dr. Ortiz felt it was less likely to represent coronary ischemia given her recent stress echocardiogram where she exercised for 4 minutes and reach 71% of her age-predicted heart rate without evidence of ischemia or reproduction of chest discomfort.  For this reason she Dr. Ortiz decided to take a little bit of a watch and see approach, and if she has recurrent symptoms to try nitro to see if it alleviated them.  I met her in December she continued to have atypical symptoms sometimes at night, sometimes when she is driving occurring about 4 times a week, she wasn't able to really try nitroglycerin and doesn't know if that would've helped.   She was borderline hypertensive at her last visit.  I asked her to monitor her symptoms and keep track of her blood pressure and how often she is having these atypical chest symptoms.     I saw her earlier this month, she did have an episode of chest discomfort that was relieved with sublingual nitro.  She was also evaluated after her visit with a CT abdomen and pelvis by her primary, which turned out to be negative.  However she had a creatinine peak of 1.54.  This forced us to cancel the CTA I had scheduled to further evaluate her symptoms.  She was very hypertensive at her last visit with a blood pressure 172/70, I added amlodipine 5 mg daily.  With this her blood pressure improved significantly and today is 110/66.  However she is having side effects of hand edema and foot edema and her foot that has polio and it is quite red.  Still having these occasional twinges of chest discomfort which continue to be atypical.    Labs Reviewed: Sodium 134, potassium 4.5, creatinine 1.35, LDL 49, triglycerides 451.    Physical Exam  Please see Below     Assessment and Plan  1.  Atypical chest discomfort.  We have been pursuing this for quite some time.  Her stress echocardiogram was inconclusive that she had poor endurance, she does walk with a cane and have polio in her foot.  Unfortunately given her renal dysfunction, I do not think we should attempt to do a CT coronary angiogram.  Today we discussed doing a nuclear stress test, she has had one done in the past and so we can compare it to that and look for any large areas of ischemia.  2.  Hypertension.  Her blood pressure is looking great today at 110/66, however she is not tolerating the amlodipine very well.  I had like her to decrease her amlodipine to 2.5 mg daily.  She will restart her lisinopril which was held for the renal insufficiency.  When I see her following her stress test will further evaluate her blood pressure, perhaps she needs to be on carvedilol for  alpha and beta-blockade.    Thank you for allowing me to see Chasity, I will see her in follow-up of her stress test  MAGALY Chowdhury, CNP      Orders Placed This Encounter   Procedures     NM Exercise stress test (nuc card)     Orders Placed This Encounter   Medications     amLODIPine (NORVASC) 5 MG tablet     Sig: Take 0.5 tablets (2.5 mg) by mouth daily     Dispense:  30 tablet     Refill:  3     Medications Discontinued During This Encounter   Medication Reason     amLODIPine (NORVASC) 5 MG tablet Reorder         CURRENT MEDICATIONS:  Current Outpatient Prescriptions   Medication Sig Dispense Refill     amLODIPine (NORVASC) 5 MG tablet Take 0.5 tablets (2.5 mg) by mouth daily 30 tablet 3     gabapentin (NEURONTIN) 100 MG capsule Take 1 capsule (100 mg) by mouth every evening as needed 90 capsule 3     metFORMIN (GLUCOPHAGE) 500 MG tablet Take 2 tablets (1,000 mg) by mouth daily (with dinner)       hydrochlorothiazide (HYDRODIURIL) 25 MG tablet Take 1 tablet (25 mg) by mouth daily 90 tablet 3     pantoprazole (PROTONIX) 40 MG EC tablet Take 40 mg by mouth 2 times daily       insulin glargine U-300 (TOUJEO SOLOSTAR) 300 UNIT/ML injection Inject Subcutaneous every morning       famotidine (PEPCID) 40 MG tablet Take 40 mg by mouth At Bedtime       Azelastine HCl (ASTEPRO NA)        triamcinolone (KENALOG) 0.1 % cream Apply topically daily       clotrimazole (LOTRIMIN) 1 % cream Apply topically daily       nitroGLYcerin (NITROSTAT) 0.4 MG sublingual tablet Place 1 tablet (0.4 mg) under the tongue every 5 minutes as needed for chest pain (no more than 3 in one hour; after 3rd, call 911.) 25 tablet 3     atenolol (TENORMIN) 50 MG tablet Take 1 tablet (50 mg) by mouth 2 times daily 180 tablet 0     fenofibrate 145 MG tablet Take 1 tablet (145 mg) by mouth daily 90 tablet 2     metoclopramide (REGLAN) 10 MG tablet Take 1-2 tabs as needed       fluticasone (FLONASE) 50 MCG/ACT spray Spray 2 sprays in nostril daily 2  sprays in each nostril qd 1 Bottle 0     cycloSPORINE (RESTASIS) 0.05 % ophthalmic emulsion 1 drop 2 times daily       sitagliptin (JANUVIA) 50 MG tablet Take 50 mg by mouth daily       Cholecalciferol (VITAMIN D-3 PO) Take 2 tablets by mouth       BIOTIN PO Take 1,000 mcg by mouth       nystatin-triamcinolone (MYCOLOG II) cream Apply topically daily as needed       ascorbic acid (VITAMIN C) 1000 MG TABS Take 1,000 mg by mouth daily       ondansetron (ZOFRAN) 4 MG tablet Take 1 tablet by mouth every 6 hours as needed Reported on 3/20/2017       aspirin (SB LOW DOSE ASA EC) 81 MG EC tablet Take 81 mg by mouth daily       nystatin (MYCOSTATIN) cream Apply topically daily as needed        levothyroxine (SYNTHROID) 112 MCG tablet Take 112 mcg by mouth daily Take 112 mcg daily except for Friday takes an additional 56 mcg.  Brand name Synthroid       diphenhydrAMINE-acetaminophen (TYLENOL PM)  MG tablet Take 1 tablet by mouth At Bedtime Reported on 3/20/2017       fexofenadine (ALLEGRA) 180 MG tablet Take 180 mg by mouth daily. 120 0     MULTIVITAMINS OR TABS ONE DAILY 100 3     [DISCONTINUED] amLODIPine (NORVASC) 5 MG tablet Take 1 tablet (5 mg) by mouth daily 30 tablet 3     lisinopril (PRINIVIL/ZESTRIL) 20 MG tablet Take 1 tablet (20 mg) by mouth daily (Patient not taking: Reported on 2/2/2018) 90 tablet 2     order for DME Equipment being ordered: Compression stockings - Knee High; 20-30 mmHg compression 2 each 0     order for DME Donut Pillow 1 each 0     order for DME Equipment being ordered: Oral appliance for sleep apnea 1 Units 0     blood glucose monitoring (NO BRAND SPECIFIED) test strip Use to test blood sugar 1 times daily or as directed. 1 Box 6       ALLERGIES     Allergies   Allergen Reactions     Nsaids Difficulty breathing     Increased creatinine     Toradol Difficulty breathing     Shortness of breath     Celecoxib Itching and Rash     Codeine Itching     With higher doses     No Clinical  Screening - See Comments Itching     Fragrance     Vioxx Other (See Comments)     Heart races     Conjugated Estrogens Rash     Sulfa Drugs Rash       PAST MEDICAL HISTORY:  Past Medical History:   Diagnosis Date     Abdominal adhesions 1984, 96,99    s/p lysis     Allergic rhinitis, cause unspecified      Carotid stenosis      CPAP (continuous positive airway pressure) dependence      Diabetic gastroparesis (H)      Diet-controlled type 2 diabetes mellitus (H)      DVT of axillary vein, acute right (H)      Fibromyalgia      Gastro-oesophageal reflux disease      Hernia of unspecified site of abdominal cavity without mention of obstruction or gangrene     bilateral     HTN (hypertension)      Hypertriglyceridemia      Obstructive sleep apnea      ANNETTE (obstructive sleep apnea)      Papillary carcinoma of thyroid (H)     s/p thyroidectomy - Ruegemer     PE (pulmonary embolism)      Poliomyelitis     poor circulation right leg     Postsurgical hypothyroidism     s/p papillary thryoid cancer - Ruegemer     Pulmonary embolism (H)      Rosacea      S/P carpal tunnel release     bilateral     S/P hardware removal 01/2014    still with lingering foot pain     S/P shoulder surgery     bilateral     Septic joint (H)     right knee     Venous insufficiency      Venous thrombosis 1999    right axillary vein       PAST SURGICAL HISTORY:  Past Surgical History:   Procedure Laterality Date     AMPUTATE TOE(S)  3/15/2012    Procedure:AMPUTATE TOE(S); Surgeon:RUBEN MOSES; Location: OR     APPENDECTOMY  1972     ARTHRODESIS FOOT  3/15/2012    Procedure:ARTHRODESIS FOOT; RIGHT TRIPLE ARTHRODESIS, FIFTH TOE AMPUTATION, LATERAL LIGAMENT RECONSTRUCTION, TENDON TRANSFER AND RELEASE [MINI C-ARM, ARTHREX 4.5 AND 6.7 STAPLES, BIOCOMPOSITE TENODESIS SCREWS]; Surgeon:RUBEN MOSES; Location: OR      FREEING BOWEL ADHESION,ENTEROLYSIS      1986, 1996, 1999     C NONSPECIFIC PROCEDURE      throidectomy     C TOTAL  ABDOM HYSTERECTOMY  1980    + BSO     CHOLECYSTECTOMY       COLOSTOMY  2/7/2012    Procedure:COLOSTOMY; CREATION OF SIGMOID COLOSTOMY AND EXTENSIVE  LYSIS OF ADHESIONS; Surgeon:MONTSERRAT BENDER; Location:[facility] OR     GI SURGERY      weakened rectal sphincter with artificial stimulator     LAPAROTOMY, LYSIS ADHESIONS, COMBINED  2/7/2012    Procedure:COMBINED LAPAROTOMY, LYSIS ADHESIONS; Surgeon:MONTSERRAT BENDER; Location:[facility] OR     RELEASE TENDON FOOT  3/15/2012    Procedure:RELEASE TENDON FOOT; Surgeon:SUKHDEEP METZ; Location:[facility] OR     REMOVE HARDWARE FOOT  12/13/2012    Procedure: REMOVE HARDWARE FOOT;  RIGHT FOOT REMOVAL OF HARDWARE;  Surgeon: Sukhdeep Metz MD;  Location: [facility] OR       FAMILY HISTORY:  Family History   Problem Relation Age of Onset     Arthritis Mother      Hypertension Mother      CEREBROVASCULAR DISEASE Mother      Obesity Mother      HEART DISEASE Mother      MI's     Hypertension Father      Respiratory Father      Adult RDS     DIABETES Father      adult     Arthritis Sister      CANCER Sister      Arthritis Sister      Hypertension Sister      CANCER Sister      colon polup     HEART DISEASE Brother      MI at 54     Hypertension Sister      Lipids Brother      Hypertension Brother      Lipids Sister      Obesity Sister      Obesity Maternal Grandmother      Skin Cancer Maternal Grandmother      skin cancer unknown     CANCER Maternal Grandmother      unknown skin cancer on face       SOCIAL HISTORY:  Social History     Social History     Marital status:      Spouse name: N/A     Number of children: N/A     Years of education: N/A     Social History Main Topics     Smoking status: Never Smoker     Smokeless tobacco: Never Used     Alcohol use No      Comment: very rare     Drug use: No     Sexual activity: Not Currently     Partners: Male     Birth control/ protection: Female Surgical     Other Topics Concern     Caffeine Concern Yes     occ     Sleep Concern Yes  "    Stress Concern Yes     Special Diet Yes     low carb, low sugar      Exercise No     occ. 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[employer]        Review of Systems:  Skin:  Negative       Eyes:  Positive for glasses    ENT:  Negative      Respiratory:  Negative       Cardiovascular:    Positive for;edema;lightheadedness (hands and legs, feet)    Gastroenterology: Positive for reflux;nausea;abdominal pain    Genitourinary:  not assessed      Musculoskeletal:  Positive for joint pain;arthritis;back pain knees & shoulders   Neurologic:  Positive for stroke;migraine headaches    Psychiatric:  Negative      Heme/Lymph/Imm:  Positive for allergies    Endocrine:  Positive for thyroid disorder;diabetes      Physical Exam:  Vitals: /66  Pulse 64  Ht 1.562 m (5' 1.5\")  Wt 92.1 kg (203 lb)  BMI 37.74 kg/m2    Constitutional:  cooperative obese      Skin:  warm and dry to the touch          Head:  normocephalic        Eyes:  pupils equal and round        Lymph:      ENT:  no pallor or cyanosis        Neck:  JVP normal;no stiffness        Respiratory:  normal breath sounds, clear to auscultation, normal A-P diameter, normal symmetry, normal respiratory excursion, no use of accessory muscles         Cardiac: regular rhythm;normal S1 and S2                                                         GI:  abdomen soft obese      Extremities and Muscular Skeletal:      bilateral LE edema;trace     The bottom of her right foot is red and mildly edematous, mild bilateral hand edema    Neurological:  no gross motor deficits        Psych:  Alert and Oriented x 3    Encounter Diagnoses   Name Primary?     Benign essential hypertension Yes     Mixed hyperlipidemia      Insufficiency, arterial, peripheral (H)      Atypical chest pain        Recent Lab Results:  LIPID RESULTS:  Lab Results   Component Value Date    CHOL 202 (H) 08/01/2017    " HDL 35 (L) 08/01/2017    LDL 49 02/15/2017    TRIG 451 (H) 08/01/2017    CHOLHDLRATIO 4.6 02/15/2017    CHOLHDLRATIO 4.6 03/31/2015       LIVER ENZYME RESULTS:  Lab Results   Component Value Date    AST 38 01/09/2018    ALT 34 01/09/2018       CBC RESULTS:  Lab Results   Component Value Date    WBC 8.0 01/02/2018    RBC 3.80 01/02/2018    HGB 11.6 (L) 01/02/2018    HCT 34.6 (L) 01/02/2018    MCV 91 01/02/2018    MCH 30.5 01/02/2018    MCHC 33.5 01/02/2018    RDW 12.5 01/02/2018     01/02/2018       BMP RESULTS:  Lab Results   Component Value Date     (L) 02/02/2018    POTASSIUM 4.5 02/02/2018    CHLORIDE 99 02/02/2018    CO2 27 02/02/2018    ANIONGAP 12.5 02/02/2018     (H) 02/02/2018    BUN 29 02/02/2018    CR 1.35 (H) 02/02/2018    GFRESTIMATED 39 (L) 02/02/2018    GFRESTBLACK 47 (L) 02/02/2018    RINKU 9.7 02/02/2018        A1C RESULTS:  Lab Results   Component Value Date    A1C 8.5 02/14/2017       INR RESULTS:  Lab Results   Component Value Date    INR 2.00 (H) 11/18/2013    INR 2.30 (H) 10/23/2013           CC  No referring provider defined for this encounter.

## 2018-02-02 NOTE — PATIENT INSTRUCTIONS
Please decrease amlodipine/norvasc to 2.5 mg (1/2 tablet) daily to see if this helps reduce the redness in your foot and swelling in your hands.    Restart your lisinopril.    Instead of a CT coronary angiogram, lets do a nuclear stress test, this does not affect your kidneys.    See me after you nuclear stress test. Bring more blood pressure readings to your follow up clinic.    Leatha [phone]

## 2018-02-02 NOTE — MR AVS SNAPSHOT
After Visit Summary   2/2/2018    Chasity Hodgson    MRN: [id number]           Patient Information     Date Of Birth          1945        Visit Information        Provider Department      2/2/2018 11:00 AM Leelee Rudolph APRN CNP [facility]        Today's Diagnoses     Benign essential hypertension    -  1    Mixed hyperlipidemia        Insufficiency, arterial, peripheral (H)        Atypical chest pain          Care Instructions    Please decrease amlodipine to 2.5 mg (1/2 tablet) daily to see if this helps reduce the redness in your foot and swelling in your hands.    Restart your lisinopril.    Instead of a CT coronary angiogram, lets do a nuclear stress test, this does not affect our kidneys.    See me after you nuclear stress test. Bring more blood pressure readings to your follow up clinic.    Leatha  [phone]              Follow-ups after your visit        Your next 10 appointments already scheduled     Feb 12, 2018 10:30 AM CST   [facility] physical with [facility] Benoit MD   [facility]    [address] [place] [id number] [phone]            Mar 12, 2018 10:30 AM CDT   (Arrive by 10:15 AM)   RETURN HAIRLOSS with Renetta Meyer MD   [facility] ([facility])    [address] [place] [id number] [phone]              Future tests that were ordered for you today     Open Future Orders        Priority Expected Expires Ordered    NM Exercise stress test (nuc card) Routine 2/9/2018 2/2/2019 2/2/2018            Who to contact     If you have questions or need follow up information about today's clinic visit or your schedule please contact [facility] directly at [phone].  Normal or non-critical lab and imaging results will be communicated to you by MyChart, letter or  "phone within 4 business days after the clinic has received the results. If you do not hear from us within 7 days, please contact the clinic through [employer] or phone. If you have a critical or abnormal lab result, we will notify you by phone as soon as possible.  Submit refill requests through [employer] or call your pharmacy and they will forward the refill request to us. Please allow 3 business days for your refill to be completed.          Additional Information About Your Visit        [employer] Information     [employer] gives you secure access to your electronic health record. If you see a primary care provider, you can also send messages to your care team and make appointments. If you have questions, please call your primary care clinic.  If you do not have a primary care provider, please call [phone] and they will assist you.        Care EveryWhere ID     This is your Care EveryWhere ID. This could be used by other organizations to access your [place] medical records  [id number]        Your Vitals Were     Pulse Height BMI (Body Mass Index)             64 1.562 m (5' 1.5\") 37.74 kg/m2          Blood Pressure from Last 3 Encounters:   02/02/18 110/66   01/10/18 172/70   01/09/18 142/80    Weight from Last 3 Encounters:   02/02/18 92.1 kg (203 lb)   01/10/18 91.2 kg (201 lb)   01/02/18 92.1 kg (203 lb)               Primary Care Provider Office Phone # Fax #    Shola Leonides Benoit [phone] [address]  [facility] [address]        Equal Access to Services     [facility] : Hadii aad ku hadasho Soomaali, waaxda luqadaha, qaybta kaalmada adeegyada, jose baldwin . So [facility] [phone].    ATENCIÓN: Si habla español, tiene a modi disposición servicios gratuitos de asistencia lingüística. Llame al [phone].    We comply with applicable federal civil rights laws and [place] laws. We do not discriminate on the basis of race, color, national origin, " age, disability, sex, sexual orientation, or gender identity.            Thank you!     Thank you for choosing [facility]  for your care. Our goal is always to provide you with excellent care. Hearing back from our patients is one way we can continue to improve our services. Please take a few minutes to complete the written survey that you may receive in the mail after your visit with us. Thank you!             Your Updated Medication List - Protect others around you: Learn how to safely use, store and throw away your medicines at www.disposemymeds.org.          This list is accurate as of 2/2/18 11:36 AM.  Always use your most recent med list.                   Brand Name Dispense Instructions for use Diagnosis    amLODIPine 5 MG tablet    NORVASC    30 tablet    Take 1 tablet (5 mg) by mouth daily    Benign essential hypertension       ascorbic acid 1000 MG Tabs    vitamin C     Take 1,000 mg by mouth daily        ASTEPRO NA           atenolol 50 MG tablet    TENORMIN    180 tablet    Take 1 tablet (50 mg) by mouth 2 times daily    Essential hypertension       BIOTIN PO      Take 1,000 mcg by mouth        blood glucose monitoring test strip    no brand specified    1 Box    Use to test blood sugar 1 times daily or as directed.    Type 2 diabetes mellitus with other specified complication (H)       clotrimazole 1 % cream    LOTRIMIN     Apply topically daily        cycloSPORINE 0.05 % ophthalmic emulsion    RESTASIS     1 drop 2 times daily        diphenhydrAMINE-acetaminophen  MG tablet    TYLENOL PM     Take 1 tablet by mouth At Bedtime Reported on 3/20/2017        famotidine 40 MG tablet    PEPCID     Take 40 mg by mouth At Bedtime        fenofibrate 145 MG tablet     90 tablet    Take 1 tablet (145 mg) by mouth daily    CARDIOVASCULAR SCREENING; LDL GOAL LESS THAN 130       fexofenadine 180 MG tablet    ALLEGRA    120    Take 180 mg by mouth daily.        fluticasone  50 MCG/ACT spray    FLONASE    1 Bottle    Spray 2 sprays in nostril daily 2 sprays in each nostril qd    Seasonal allergic rhinitis due to other allergic trigger       gabapentin 100 MG capsule    NEURONTIN    90 capsule    Take 1 capsule (100 mg) by mouth every evening as needed    Poliomyelitis       hydrochlorothiazide 25 MG tablet    HYDRODIURIL    90 tablet    Take 1 tablet (25 mg) by mouth daily    Essential hypertension       lisinopril 20 MG tablet    PRINIVIL/ZESTRIL    90 tablet    Take 1 tablet (20 mg) by mouth daily    Essential hypertension       metFORMIN 500 MG tablet    GLUCOPHAGE     Take 2 tablets (1,000 mg) by mouth daily (with dinner)        metoclopramide 10 MG tablet    REGLAN     Take 1-2 tabs as needed        multivitamin per tablet     100    ONE DAILY        nitroGLYcerin 0.4 MG sublingual tablet    NITROSTAT    25 tablet    Place 1 tablet (0.4 mg) under the tongue every 5 minutes as needed for chest pain (no more than 3 in one hour; after 3rd, call 911.)    Atypical chest pain       nystatin cream    MYCOSTATIN     Apply topically daily as needed        nystatin-triamcinolone cream    MYCOLOG II     Apply topically daily as needed        ondansetron 4 MG tablet    ZOFRAN     Take 1 tablet by mouth every 6 hours as needed Reported on 3/20/2017        * order for DME     1 Units    Equipment being ordered: Oral appliance for sleep apnea    ANNETTE (obstructive sleep apnea)       * order for DME     1 each    Donut Pillow    Coccydynia       * order for DME     2 each    Equipment being ordered: Compression stockings - Knee High; 20-30 mmHg compression    Insufficiency, arterial, peripheral (H)       pantoprazole 40 MG EC tablet    PROTONIX     Take 40 mg by mouth 2 times daily        SB LOW DOSE ASA EC 81 MG EC tablet   Generic drug:  aspirin      Take 81 mg by mouth daily        sitagliptin 50 MG tablet    JANUVIA     Take 50 mg by mouth daily        SYNTHROID 112 MCG tablet   Generic drug:   levothyroxine      Take 112 mcg by mouth daily Take 112 mcg daily except for Friday takes an additional 56 mcg.  Brand name Synthroid        TOUJEO SOLOSTAR 300 UNIT/ML injection   Generic drug:  insulin glargine U-300      Inject Subcutaneous every morning        triamcinolone 0.1 % cream    KENALOG     Apply topically daily        VITAMIN D-3 PO      Take 2 tablets by mouth        * Notice:  This list has 3 medication(s) that are the same as other medications prescribed for you. Read the directions carefully, and ask your doctor or other care provider to review them with you.

## 2018-02-02 NOTE — LETTER
2/2/2018    Shola Benoit MD, MD  [address]  [facility] [address]    RE: Chasity A Rema       Dear Colleague,    I had the pleasure of seeing Chasity Hodgson in the [facility].    HPI and Plan:   I had the pleasure of seeing Chasity Hodgson today in cardiology clinic follow-up.  She is a pleasant 72-year-old patient of Dr. Ortiz.  She has a history of atypical chest discomfort, hypertension and lower extremity edema and type 2 diabetes.  She has a history of GERD, a year ago she had dilated esophageal strictures, she takes Protonix, and more recently her GI doctor switched her ranitidine to Lodine.  She has hypertension and carotid disease. She has a history of childhood polio for which she wears a right ankle brace.  She recently saw Dr. Ortiz for her atypical chest discomfort, I saw her in follow up and asked her to closely monitor her symptoms and come back today. She continues to work for the [employer].      In 2011 CT coronary angiography demonstrated the absence of obstructive coronary disease.  Her last nuclear stress test in 2014 showed no evidence of ischemia or infarct, she had normal LV systolic function.  She does have peripheral artery disease based on an evaluation done by Dr. Holliday in 2016.  She wore a Holter monitor which previously demonstrated a normal sinus rhythm with average heart rate variability and isolated PVCs.     Chasity saw Dr. Ortiz in November after an episode of atypical chest chest discomfort after a stressful event.  Dr. Ortiz felt it was less likely to represent coronary ischemia given her recent stress echocardiogram where she exercised for 4 minutes and reach 71% of her age-predicted heart rate without evidence of ischemia or reproduction of chest discomfort.  For this reason she Dr. Ortiz decided to take a little bit of a watch and see approach, and if she has recurrent symptoms to try nitro to see if it alleviated them.  I  met her in December she continued to have atypical symptoms sometimes at night, sometimes when she is driving occurring about 4 times a week, she wasn't able to really try nitroglycerin and doesn't know if that would've helped.  She was borderline hypertensive at her last visit.  I asked her to monitor her symptoms and keep track of her blood pressure and how often she is having these atypical chest symptoms.     I saw her earlier this month, she did have an episode of chest discomfort that was relieved with sublingual nitro.  She was also evaluated after her visit with a CT abdomen and pelvis by her primary, which turned out to be negative.  However she had a creatinine peak of 1.54.  This forced us to cancel the CTA I had scheduled to further evaluate her symptoms.  She was very hypertensive at her last visit with a blood pressure 172/70, I added amlodipine 5 mg daily.  With this her blood pressure improved significantly and today is 110/66.  However she is having side effects of hand edema and foot edema and her foot that has polio and it is quite red.  Still having these occasional twinges of chest discomfort which continue to be atypical.    Labs Reviewed: Sodium 134, potassium 4.5, creatinine 1.35, LDL 49, triglycerides 451.    Physical Exam  Please see Below     Assessment and Plan  1.  Atypical chest discomfort.  We have been pursuing this for quite some time.  Her stress echocardiogram was inconclusive that she had poor endurance, she does walk with a cane and have polio in her foot.  Unfortunately given her renal dysfunction, I do not think we should attempt to do a CT coronary angiogram.  Today we discussed doing a nuclear stress test, she has had one done in the past and so we can compare it to that and look for any large areas of ischemia.  2.  Hypertension.  Her blood pressure is looking great today at 110/66, however she is not tolerating the amlodipine very well.  I had like her to decrease her  amlodipine to 2.5 mg daily.  She will restart her lisinopril which was held for the renal insufficiency.  When I see her following her stress test will further evaluate her blood pressure, perhaps she needs to be on carvedilol for alpha and beta-blockade.    Thank you for allowing me to see Chasity, I will see her in follow-up of her stress test  MAGALY Chowdhury, CNP      Orders Placed This Encounter   Procedures     NM Exercise stress test (nuc card)     Orders Placed This Encounter   Medications     amLODIPine (NORVASC) 5 MG tablet     Sig: Take 0.5 tablets (2.5 mg) by mouth daily     Dispense:  30 tablet     Refill:  3     Medications Discontinued During This Encounter   Medication Reason     amLODIPine (NORVASC) 5 MG tablet Reorder         CURRENT MEDICATIONS:  Current Outpatient Prescriptions   Medication Sig Dispense Refill     amLODIPine (NORVASC) 5 MG tablet Take 0.5 tablets (2.5 mg) by mouth daily 30 tablet 3     gabapentin (NEURONTIN) 100 MG capsule Take 1 capsule (100 mg) by mouth every evening as needed 90 capsule 3     metFORMIN (GLUCOPHAGE) 500 MG tablet Take 2 tablets (1,000 mg) by mouth daily (with dinner)       hydrochlorothiazide (HYDRODIURIL) 25 MG tablet Take 1 tablet (25 mg) by mouth daily 90 tablet 3     pantoprazole (PROTONIX) 40 MG EC tablet Take 40 mg by mouth 2 times daily       insulin glargine U-300 (TOUJEO SOLOSTAR) 300 UNIT/ML injection Inject Subcutaneous every morning       famotidine (PEPCID) 40 MG tablet Take 40 mg by mouth At Bedtime       Azelastine HCl (ASTEPRO NA)        triamcinolone (KENALOG) 0.1 % cream Apply topically daily       clotrimazole (LOTRIMIN) 1 % cream Apply topically daily       nitroGLYcerin (NITROSTAT) 0.4 MG sublingual tablet Place 1 tablet (0.4 mg) under the tongue every 5 minutes as needed for chest pain (no more than 3 in one hour; after 3rd, call 911.) 25 tablet 3     atenolol (TENORMIN) 50 MG tablet Take 1 tablet (50 mg) by mouth 2 times daily 180  tablet 0     fenofibrate 145 MG tablet Take 1 tablet (145 mg) by mouth daily 90 tablet 2     metoclopramide (REGLAN) 10 MG tablet Take 1-2 tabs as needed       fluticasone (FLONASE) 50 MCG/ACT spray Spray 2 sprays in nostril daily 2 sprays in each nostril qd 1 Bottle 0     cycloSPORINE (RESTASIS) 0.05 % ophthalmic emulsion 1 drop 2 times daily       sitagliptin (JANUVIA) 50 MG tablet Take 50 mg by mouth daily       Cholecalciferol (VITAMIN D-3 PO) Take 2 tablets by mouth       BIOTIN PO Take 1,000 mcg by mouth       nystatin-triamcinolone (MYCOLOG II) cream Apply topically daily as needed       ascorbic acid (VITAMIN C) 1000 MG TABS Take 1,000 mg by mouth daily       ondansetron (ZOFRAN) 4 MG tablet Take 1 tablet by mouth every 6 hours as needed Reported on 3/20/2017       aspirin (SB LOW DOSE ASA EC) 81 MG EC tablet Take 81 mg by mouth daily       nystatin (MYCOSTATIN) cream Apply topically daily as needed        levothyroxine (SYNTHROID) 112 MCG tablet Take 112 mcg by mouth daily Take 112 mcg daily except for Friday takes an additional 56 mcg.  Brand name Synthroid       diphenhydrAMINE-acetaminophen (TYLENOL PM)  MG tablet Take 1 tablet by mouth At Bedtime Reported on 3/20/2017       fexofenadine (ALLEGRA) 180 MG tablet Take 180 mg by mouth daily. 120 0     MULTIVITAMINS OR TABS ONE DAILY 100 3     [DISCONTINUED] amLODIPine (NORVASC) 5 MG tablet Take 1 tablet (5 mg) by mouth daily 30 tablet 3     lisinopril (PRINIVIL/ZESTRIL) 20 MG tablet Take 1 tablet (20 mg) by mouth daily (Patient not taking: Reported on 2/2/2018) 90 tablet 2     order for DME Equipment being ordered: Compression stockings - Knee High; 20-30 mmHg compression 2 each 0     order for DME Donut Pillow 1 each 0     order for DME Equipment being ordered: Oral appliance for sleep apnea 1 Units 0     blood glucose monitoring (NO BRAND SPECIFIED) test strip Use to test blood sugar 1 times daily or as directed. 1 Box 6       ALLERGIES      Allergies   Allergen Reactions     Nsaids Difficulty breathing     Increased creatinine     Toradol Difficulty breathing     Shortness of breath     Celecoxib Itching and Rash     Codeine Itching     With higher doses     No Clinical Screening - See Comments Itching     Fragrance     Vioxx Other (See Comments)     Heart races     Conjugated Estrogens Rash     Sulfa Drugs Rash       PAST MEDICAL HISTORY:  Past Medical History:   Diagnosis Date     Abdominal adhesions 1984, 96,99    s/p lysis     Allergic rhinitis, cause unspecified      Carotid stenosis      CPAP (continuous positive airway pressure) dependence      Diabetic gastroparesis (H)      Diet-controlled type 2 diabetes mellitus (H)      DVT of axillary vein, acute right (H)      Fibromyalgia      Gastro-oesophageal reflux disease      Hernia of unspecified site of abdominal cavity without mention of obstruction or gangrene     bilateral     HTN (hypertension)      Hypertriglyceridemia      Obstructive sleep apnea      ANNETTE (obstructive sleep apnea)      Papillary carcinoma of thyroid (H)     s/p thyroidectomy - Ruegemer     PE (pulmonary embolism)      Poliomyelitis     poor circulation right leg     Postsurgical hypothyroidism     s/p papillary thryoid cancer - Ruegemer     Pulmonary embolism (H)      Rosacea      S/P carpal tunnel release     bilateral     S/P hardware removal 01/2014    still with lingering foot pain     S/P shoulder surgery     bilateral     Septic joint (H)     right knee     Venous insufficiency      Venous thrombosis 1999    right axillary vein       PAST SURGICAL HISTORY:  Past Surgical History:   Procedure Laterality Date     AMPUTATE TOE(S)  3/15/2012    Procedure:AMPUTATE TOE(S); Surgeon:RUBEN MOSES; Location:[facility] OR     APPENDECTOMY  1972     ARTHRODESIS FOOT  3/15/2012    Procedure:ARTHRODESIS FOOT; RIGHT TRIPLE ARTHRODESIS, FIFTH TOE AMPUTATION, LATERAL LIGAMENT RECONSTRUCTION, TENDON TRANSFER AND RELEASE [MINI  C-ARM, ARTHREX 4.5 AND 6.7 STAPLES, BIOCOMPOSITE TENODESIS SCREWS]; Surgeon:SUKHDEEP METZ; Location:[facility] OR     C FREEING BOWEL ADHESION,ENTEROLYSIS      1986, 1996, 1999     C NONSPECIFIC PROCEDURE      throidectomy     C TOTAL ABDOM HYSTERECTOMY  1980    + BSO     CHOLECYSTECTOMY       COLOSTOMY  2/7/2012    Procedure:COLOSTOMY; CREATION OF SIGMOID COLOSTOMY AND EXTENSIVE  LYSIS OF ADHESIONS; Surgeon:MONTSERRAT BENDER; Location: OR     GI SURGERY      weakened rectal sphincter with artificial stimulator     LAPAROTOMY, LYSIS ADHESIONS, COMBINED  2/7/2012    Procedure:COMBINED LAPAROTOMY, LYSIS ADHESIONS; Surgeon:MONTSERRAT BENDER; Location: OR     RELEASE TENDON FOOT  3/15/2012    Procedure:RELEASE TENDON FOOT; Surgeon:SUKHDEEP METZ; Location: OR     REMOVE HARDWARE FOOT  12/13/2012    Procedure: REMOVE HARDWARE FOOT;  RIGHT FOOT REMOVAL OF HARDWARE;  Surgeon: Sukhdeep Metz MD;  Location: [facility] OR       FAMILY HISTORY:  Family History   Problem Relation Age of Onset     Arthritis Mother      Hypertension Mother      CEREBROVASCULAR DISEASE Mother      Obesity Mother      HEART DISEASE Mother      MI's     Hypertension Father      Respiratory Father      Adult RDS     DIABETES Father      adult     Arthritis Sister      CANCER Sister      Arthritis Sister      Hypertension Sister      CANCER Sister      colon polup     HEART DISEASE Brother      MI at 54     Hypertension Sister      Lipids Brother      Hypertension Brother      Lipids Sister      Obesity Sister      Obesity Maternal Grandmother      Skin Cancer Maternal Grandmother      skin cancer unknown     CANCER Maternal Grandmother      unknown skin cancer on face       SOCIAL HISTORY:  Social History     Social History     Marital status:      Spouse name: N/A     Number of children: N/A     Years of education: N/A     Social History Main Topics     Smoking status: Never Smoker     Smokeless tobacco: Never Used      "Alcohol use No      Comment: very rare     Drug use: No     Sexual activity: Not Currently     Partners: Male     Birth control/ protection: Female Surgical     Other Topics Concern     Caffeine Concern Yes     occ     Sleep Concern Yes     Stress Concern Yes     Special Diet Yes     low carb, low sugar      Exercise No     occ. 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[employer]        Review of Systems:  Skin:  Negative       Eyes:  Positive for glasses    ENT:  Negative      Respiratory:  Negative       Cardiovascular:    Positive for;edema;lightheadedness (hands and legs, feet)    Gastroenterology: Positive for reflux;nausea;abdominal pain    Genitourinary:  not assessed      Musculoskeletal:  Positive for joint pain;arthritis;back pain knees & shoulders   Neurologic:  Positive for stroke;migraine headaches    Psychiatric:  Negative      Heme/Lymph/Imm:  Positive for allergies    Endocrine:  Positive for thyroid disorder;diabetes      Physical Exam:  Vitals: /66  Pulse 64  Ht 1.562 m (5' 1.5\")  Wt 92.1 kg (203 lb)  BMI 37.74 kg/m2    Constitutional:  cooperative obese      Skin:  warm and dry to the touch          Head:  normocephalic        Eyes:  pupils equal and round        Lymph:      ENT:  no pallor or cyanosis        Neck:  JVP normal;no stiffness        Respiratory:  normal breath sounds, clear to auscultation, normal A-P diameter, normal symmetry, normal respiratory excursion, no use of accessory muscles         Cardiac: regular rhythm;normal S1 and S2                                                         GI:  abdomen soft obese      Extremities and Muscular Skeletal:      bilateral LE edema;trace     The bottom of her right foot is red and mildly edematous, mild bilateral hand edema    Neurological:  no gross motor deficits        Psych:  Alert and Oriented x 3    Encounter Diagnoses   Name Primary?     " Benign essential hypertension Yes     Mixed hyperlipidemia      Insufficiency, arterial, peripheral (H)      Atypical chest pain        Recent Lab Results:  LIPID RESULTS:  Lab Results   Component Value Date    CHOL 202 (H) 08/01/2017    HDL 35 (L) 08/01/2017    LDL 49 02/15/2017    TRIG 451 (H) 08/01/2017    CHOLHDLRATIO 4.6 02/15/2017    CHOLHDLRATIO 4.6 03/31/2015       LIVER ENZYME RESULTS:  Lab Results   Component Value Date    AST 38 01/09/2018    ALT 34 01/09/2018       CBC RESULTS:  Lab Results   Component Value Date    WBC 8.0 01/02/2018    RBC 3.80 01/02/2018    HGB 11.6 (L) 01/02/2018    HCT 34.6 (L) 01/02/2018    MCV 91 01/02/2018    MCH 30.5 01/02/2018    MCHC 33.5 01/02/2018    RDW 12.5 01/02/2018     01/02/2018       BMP RESULTS:  Lab Results   Component Value Date     (L) 02/02/2018    POTASSIUM 4.5 02/02/2018    CHLORIDE 99 02/02/2018    CO2 27 02/02/2018    ANIONGAP 12.5 02/02/2018     (H) 02/02/2018    BUN 29 02/02/2018    CR 1.35 (H) 02/02/2018    GFRESTIMATED 39 (L) 02/02/2018    GFRESTBLACK 47 (L) 02/02/2018    RINKU 9.7 02/02/2018        A1C RESULTS:  Lab Results   Component Value Date    A1C 8.5 02/14/2017       INR RESULTS:  Lab Results   Component Value Date    INR 2.00 (H) 11/18/2013    INR 2.30 (H) 10/23/2013     Thank you for allowing me to participate in the care of your patient.    Sincerely,     MAGALY [facility]

## 2018-02-05 DIAGNOSIS — I10 ESSENTIAL HYPERTENSION: ICD-10-CM

## 2018-02-06 ENCOUNTER — HOSPITAL ENCOUNTER (OUTPATIENT)
Dept: CARDIOLOGY | Facility: CLINIC | Age: 73
Discharge: HOME | End: 2018-02-06
Attending: NURSE PRACTITIONER | Admitting: NURSE PRACTITIONER
Payer: MEDICARE

## 2018-02-06 ENCOUNTER — TELEPHONE (OUTPATIENT)
Dept: CARDIOLOGY | Facility: CLINIC | Age: 73
End: 2018-02-06

## 2018-02-06 VITALS — DIASTOLIC BLOOD PRESSURE: 74 MMHG | SYSTOLIC BLOOD PRESSURE: 140 MMHG

## 2018-02-06 DIAGNOSIS — R07.89 ATYPICAL CHEST PAIN: ICD-10-CM

## 2018-02-06 PROCEDURE — 25000128 H RX IP 250 OP 636: Performed by: INTERNAL MEDICINE

## 2018-02-06 PROCEDURE — 93018 CV STRESS TEST I&R ONLY: CPT | Performed by: INTERNAL MEDICINE

## 2018-02-06 PROCEDURE — 34300033 ZZH RX 343: Performed by: NURSE PRACTITIONER

## 2018-02-06 PROCEDURE — 93017 CV STRESS TEST TRACING ONLY: CPT

## 2018-02-06 PROCEDURE — A9502 TC99M TETROFOSMIN: HCPCS | Performed by: NURSE PRACTITIONER

## 2018-02-06 PROCEDURE — 93016 CV STRESS TEST SUPVJ ONLY: CPT | Performed by: INTERNAL MEDICINE

## 2018-02-06 PROCEDURE — 78452 HT MUSCLE IMAGE SPECT MULT: CPT | Mod: 26 | Performed by: INTERNAL MEDICINE

## 2018-02-06 RX ORDER — AMINOPHYLLINE 25 MG/ML
50-100 INJECTION, SOLUTION INTRAVENOUS
Status: DISCONTINUED | OUTPATIENT
Start: 2018-02-06 | End: 2018-02-07 | Stop reason: HOSPADM

## 2018-02-06 RX ORDER — ALBUTEROL SULFATE 90 UG/1
2 AEROSOL, METERED RESPIRATORY (INHALATION) EVERY 5 MIN PRN
Status: DISCONTINUED | OUTPATIENT
Start: 2018-02-06 | End: 2018-02-07 | Stop reason: HOSPADM

## 2018-02-06 RX ORDER — REGADENOSON 0.08 MG/ML
0.4 INJECTION, SOLUTION INTRAVENOUS ONCE
Status: COMPLETED | OUTPATIENT
Start: 2018-02-06 | End: 2018-02-06

## 2018-02-06 RX ORDER — ATENOLOL 50 MG/1
50 TABLET ORAL 2 TIMES DAILY
Qty: 180 TABLET | Refills: 3 | Status: SHIPPED | OUTPATIENT
Start: 2018-02-06 | End: 2019-02-20

## 2018-02-06 RX ORDER — ACYCLOVIR 200 MG/1
0-1 CAPSULE ORAL
Status: DISCONTINUED | OUTPATIENT
Start: 2018-02-06 | End: 2018-02-07 | Stop reason: HOSPADM

## 2018-02-06 RX ADMIN — REGADENOSON 0.4 MG: 0.08 INJECTION, SOLUTION INTRAVENOUS at 14:08

## 2018-02-06 RX ADMIN — TETROFOSMIN 6.2 MCI.: 1.38 INJECTION, POWDER, LYOPHILIZED, FOR SOLUTION INTRAVENOUS at 12:00

## 2018-02-06 RX ADMIN — TETROFOSMIN 17.1 MCI.: 1.38 INJECTION, POWDER, LYOPHILIZED, FOR SOLUTION INTRAVENOUS at 14:09

## 2018-02-06 NOTE — TELEPHONE ENCOUNTER
"Last Written Prescription Date:  11/01/17   Last Fill Quantity: 180 tablet,  # refills: 0   Last Office Visit with [facility] provider:  1/09/18 Kaycee   Future Office Visit:    Next 5 appointments (look out 90 days)     Feb 12, 2018 10:30 AM CST   Pre-Op physical with Shola Benoit MD   [facility]    [address] [facility] [id number] [phone]                     Requested Prescriptions   Pending Prescriptions Disp Refills     atenolol (TENORMIN) 50 MG tablet 180 tablet 0     Sig: Take 1 tablet (50 mg) by mouth 2 times daily    Beta-Blockers Protocol Passed    2/5/2018  8:27 PM       Passed - Blood pressure under 140/90    BP Readings from Last 3 Encounters:   02/02/18 110/66   01/10/18 172/70   01/09/18 142/80                Passed - Patient is age 6 or older       Passed - Recent or future visit with authorizing provider's specialty    Patient had office visit in the last year or has a visit in the next 30 days with authorizing provider.  See \"Patient Info\" tab in inbasket, or \"Choose Columns\" in Meds & Orders section of the refill encounter.               "

## 2018-02-06 NOTE — TELEPHONE ENCOUNTER
Overhead Paged 4:53 PM  with abnormal Nuclear stress test .  Results reviewed with Dr. Guerrero.  Patient called with results. Patient states she had 1 brief episode of a stab of discomfort that was while driving over the weekend. Patient states the discomfort is hard to explain. Similar to what she experienced and was discussed with CHRIS OV 2-2-18.  Medications reviewed.   Will discuss test in detail with Dr. Ortiz.   Patient is scheduled for surgery 2-22-18.   Patient aware that if discomfort changes, persists, or worsens to go into ER for evaluation. Patient agrees with plan.

## 2018-02-08 ENCOUNTER — TELEPHONE (OUTPATIENT)
Dept: CARDIOLOGY | Facility: CLINIC | Age: 73
End: 2018-02-08

## 2018-02-08 NOTE — TELEPHONE ENCOUNTER
This writer spoke with Chasity. She called because she was told 2/6 that her nuclear stress test was abnormal. Dr. Ortiz was to look at it and someone was going to call her after that. She would like someone to call her.

## 2018-02-09 ENCOUNTER — TELEPHONE (OUTPATIENT)
Dept: CARDIOLOGY | Facility: CLINIC | Age: 73
End: 2018-02-09

## 2018-02-09 NOTE — TELEPHONE ENCOUNTER
Contacted patient, she would like to have the CTa asap so she can continue on to shoulder surgery on 2/22/18, unless there are heart issues. Connected patient to scheduling

## 2018-02-09 NOTE — TELEPHONE ENCOUNTER
Spoke to Leatha NP and she stated that patient can try holding the Amlodipine and see if that helps with her swelling but to monitor her BP as it may increase. Leatha NP would like like patinet to follow up with her, Dr. Ortiz or another MD after her CTA on 2/16//18.     Spoke to patients  and informed him of Leatha's recommendations above. Pts  stated that he would inform her and have her call scheduling when she gets a chance either this afternoon or on Monday to make follow up OV. Scheduling number given.

## 2018-02-09 NOTE — TELEPHONE ENCOUNTER
Would complete CT coronary angiogram that appears to be already ordered.  Dont cancel surgery but try to complete CTA next week or early the following week. CD

## 2018-02-09 NOTE — TELEPHONE ENCOUNTER
Received VM from patient requesting a call back as she is having swelling on her hands and feet even on the 2.5 mg daily. Tried to call patient back to discuss. Writer spoke to patients  as patient it as work. Pts  stated patient has gained 3 lbs since her last OV 2/2/18 but denies SOB. Pt wondering if she could try a different medication instead.     2/2/18 OV with Leatha NP   2.  Hypertension.  Her blood pressure is looking great today at 110/66, however she is not tolerating the amlodipine very well.  I had like her to decrease her amlodipine to 2.5 mg daily.  She will restart her lisinopril which was held for the renal insufficiency.  When I see her following her stress test will further evaluate her blood pressure, perhaps she needs to be on carvedilol for alpha and beta-blockade.    Will route to Leatha NP to review and advise.

## 2018-02-12 ENCOUNTER — OFFICE VISIT (OUTPATIENT)
Dept: FAMILY MEDICINE | Facility: CLINIC | Age: 73
End: 2018-02-12
Payer: MEDICARE

## 2018-02-12 VITALS
WEIGHT: 205.4 LBS | BODY MASS INDEX: 37.8 KG/M2 | HEART RATE: 60 BPM | OXYGEN SATURATION: 100 % | DIASTOLIC BLOOD PRESSURE: 81 MMHG | HEIGHT: 62 IN | SYSTOLIC BLOOD PRESSURE: 159 MMHG

## 2018-02-12 DIAGNOSIS — E89.0 POSTSURGICAL HYPOTHYROIDISM: ICD-10-CM

## 2018-02-12 DIAGNOSIS — Z01.818 PREOP GENERAL PHYSICAL EXAM: Primary | ICD-10-CM

## 2018-02-12 DIAGNOSIS — E11.69 TYPE 2 DIABETES MELLITUS WITH OTHER SPECIFIED COMPLICATION, WITHOUT LONG-TERM CURRENT USE OF INSULIN (H): ICD-10-CM

## 2018-02-12 DIAGNOSIS — E78.5 HYPERLIPIDEMIA LDL GOAL <100: ICD-10-CM

## 2018-02-12 DIAGNOSIS — Z93.3 COLOSTOMY IN PLACE (H): ICD-10-CM

## 2018-02-12 DIAGNOSIS — I25.10 CORONARY ARTERY DISEASE INVOLVING NATIVE HEART WITHOUT ANGINA PECTORIS, UNSPECIFIED VESSEL OR LESION TYPE: ICD-10-CM

## 2018-02-12 DIAGNOSIS — D64.9 NORMOCYTIC ANEMIA: ICD-10-CM

## 2018-02-12 DIAGNOSIS — G47.33 OSA (OBSTRUCTIVE SLEEP APNEA): ICD-10-CM

## 2018-02-12 DIAGNOSIS — K21.9 GASTROESOPHAGEAL REFLUX DISEASE WITHOUT ESOPHAGITIS: ICD-10-CM

## 2018-02-12 LAB
ERYTHROCYTE [DISTWIDTH] IN BLOOD BY AUTOMATED COUNT: 12.6 % (ref 10–15)
HBA1C MFR BLD: 6.5 % (ref 4.3–6)
HCT VFR BLD AUTO: 33 % (ref 35–47)
HGB BLD-MCNC: 11 G/DL (ref 11.7–15.7)
MCH RBC QN AUTO: 30.1 PG (ref 26.5–33)
MCHC RBC AUTO-ENTMCNC: 33.3 G/DL (ref 31.5–36.5)
MCV RBC AUTO: 90 FL (ref 78–100)
PLATELET # BLD AUTO: 221 10E9/L (ref 150–450)
RBC # BLD AUTO: 3.66 10E12/L (ref 3.8–5.2)
WBC # BLD AUTO: 7.4 10E9/L (ref 4–11)

## 2018-02-12 PROCEDURE — 36415 COLL VENOUS BLD VENIPUNCTURE: CPT | Performed by: INTERNAL MEDICINE

## 2018-02-12 PROCEDURE — 93000 ELECTROCARDIOGRAM COMPLETE: CPT | Performed by: INTERNAL MEDICINE

## 2018-02-12 PROCEDURE — 99214 OFFICE O/P EST MOD 30 MIN: CPT | Performed by: INTERNAL MEDICINE

## 2018-02-12 PROCEDURE — 85027 COMPLETE CBC AUTOMATED: CPT | Performed by: INTERNAL MEDICINE

## 2018-02-12 PROCEDURE — 80053 COMPREHEN METABOLIC PANEL: CPT | Performed by: INTERNAL MEDICINE

## 2018-02-12 PROCEDURE — 83036 HEMOGLOBIN GLYCOSYLATED A1C: CPT | Performed by: INTERNAL MEDICINE

## 2018-02-12 RX ORDER — ROSUVASTATIN CALCIUM 10 MG/1
10 TABLET, COATED ORAL DAILY
Qty: 30 TABLET | Refills: 11 | Status: SHIPPED | OUTPATIENT
Start: 2018-02-12 | End: 2018-02-20

## 2018-02-12 NOTE — PATIENT INSTRUCTIONS
Before Your Surgery      Call your surgeon if there is any change in your health. This includes signs of a cold or flu (such as a sore throat, runny nose, cough, rash or fever).    Do not smoke, drink alcohol or take over the counter medicine (unless your surgeon or primary care doctor tells you to) for the 24 hours before and after surgery.    If you take prescribed drugs: Follow your doctor s orders about which medicines to take and which to stop until after surgery.    Eating and drinking prior to surgery: follow the instructions from your surgeon    Take a shower or bath the night before surgery. Use the soap your surgeon gave you to gently clean your skin. If you do not have soap from your surgeon, use your regular soap. Do not shave or scrub the surgery site.  Wear clean pajamas and have clean sheets on your bed.     (Z01.818) Preop general physical exam  (primary encounter diagnosis)  Comment: We will await upcoming discussion and CT angiogram with cardiology.  For now, continue on aspirin 81 mg, statin, blood pressure and ace inhibitor. If cleared from cardiac stand point we will hold metformin and januvia and take 1/2 of your normal insulin dose that morning.  Hold hydrochlorothiazide and lisinopril on the day of surgery as well.    Plan: CBC with platelets, Comprehensive metabolic         panel, EKG 12-lead complete w/read - Clinics            (E11.69) Type 2 diabetes mellitus with other specified complication, without long-term current use of insulin (H)  Comment: As above - hold metformin and januvia as we discussed on the day of surgery  Plan: Hemoglobin A1c, CBC with platelets,         Comprehensive metabolic panel            (K21.9) Gastroesophageal reflux disease without esophagitis  Comment: Continue protonix  Plan:     (G47.33) ANNETTE (obstructive sleep apnea)  Comment: not using CPAP currently  Plan:     (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

## 2018-02-12 NOTE — MR AVS SNAPSHOT
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 This includes signs of a cold or flu (such as a sore throat, runny nose, cough, rash or fever).    Do not smoke, drink alcohol or take over the counter medicine (unless your surgeon or primary care doctor tells you to) for the 24 hours before and after surgery.    If you take prescribed drugs: Follow your doctor s orders about which medicines to take and which to stop until after surgery.    Eating and drinking prior to surgery: follow the instructions from your surgeon    Take a shower or bath the night before surgery. Use the soap your surgeon gave you to gently clean your skin. If you do not have soap from your surgeon, use your regular soap. Do not shave or scrub the surgery site.  Wear clean pajamas and have clean sheets on your bed.     (Z01.818) Preop general physical exam  (primary encounter diagnosis)  Comment: We will await upcoming discussion and CT angiogram with cardiology.  For now, continue on aspirin 81 mg, statin, blood pressure and ace inhibitor. If cleared from cardiac stand point we will hold metformin and januvia  and take 1/2 of your normal insulin dose that morning.  Hold hydrochlorothiazide and lisinopril on the day of surgery as well.    Plan: CBC with platelets, Comprehensive metabolic         panel, EKG 12-lead complete w/read - Clinics            (E11.69) Type 2 diabetes mellitus with other specified complication, without long-term current use of insulin (H)  Comment: As above - hold metformin and januvia as we discussed on the day of surgery  Plan: Hemoglobin A1c, CBC with platelets,         Comprehensive metabolic panel            (K21.9) Gastroesophageal reflux disease without esophagitis  Comment: Continue protonix  Plan:     (G47.33) ANNETTE (obstructive sleep apnea)  Comment: not using CPAP currently  Plan:     (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)  Comment: Stable, would recommend follow up in gastroenterology   Plan:     (E89.0) Postsurgical hypothyroidism  Comment: stable  Plan:     (D64.9) Normocytic anemia  Comment: As discussed - conitnued oral iron in the AM and I would recommend follow up in gastroenterology to review this  Plan:     (I25.10) Coronary artery disease involving native heart without angina pectoris, unspecified vessel or lesion type  Comment: noted on recent stress vicky  Plan:              Follow-ups after your visit        Your next 10 appointments already scheduled     Feb 16, 2018  2:00 PM CST   CTA ANGIOGRAM CORONARY ARTERY with SCICT1   [facility] ([facility])    [address] [place] [address]  [address] [address] [phone]           Please allow two hours for this test.  Follow the instructions below:   The day before your exam, drink extra fluids at least six 8-ounce glasses (unless your doctor wants you to restrict your fluids).   No caffeine and no smoking the day of the test.   Do not eat or drink for 2 hours before your exam. You may take your morning medicines with small sips of water.   If you take Viagra, Levitra or  Cialis, stop taking it for 48 hours before your test.  For patients with diabetes:   You may need a blood test (creatinine test) within 30 days of your exam; the Cardiologist/Radiologist may require you to get this test prior to your exam   If you are diabetic and take oral hypoglycemics, do not take them on the day of your test.  Bring any scans or X-rays taken at other hospitals, if similar tests were done. Also bring a list of your medicines, including vitamins, minerals and over-the-counter drugs. It is safest to leave personal items at home.  Be sure to tell your doctor:   If you have any allergies, including any reaction to contrast.   If you are breastfeeding or there is a chance you are pregnant.  Please wear loose clothing, such as a sweat suit or jogging clothes. Avoid snaps, zippers and other metal. We may ask you to undress and put on a hospital gown.  If you have any questions, please call the Imaging Department where you will have your exam.            Mar 12, 2018 10:30 AM CDT   (Arrive by 10:15 AM)   RETURN HAIRLOSS with Renetta Meyer MD   [facility] ([facility])    [address] [place] [address] [phone]              Who to contact     If you have questions or need follow up information about today's clinic visit or your schedule please contact [facility] directly at [phone].  Normal or non-critical lab and imaging results will be communicated to you by MyChart, letter or phone within 4 business days after the clinic has received the results. If you do not hear from us within 7 days, please contact the clinic through MyChart or phone. If you have a critical or abnormal lab result, we will notify you by phone as soon as possible.  Submit refill requests through CellPly or call your pharmacy and they will forward the refill request to us. Please allow 3 business days for your refill to be completed.     "      Additional Information About Your Visit        MyChart Information     AddThis gives you secure access to your electronic health record. If you see a primary care provider, you can also send messages to your care team and make appointments. If you have questions, please call your primary care clinic.  If you do not have a primary care provider, please call [phone] and they will assist you.        Care EveryWhere ID     This is your Care EveryWhere ID. This could be used by other organizations to access your [facility] medical records  [id number]        Your Vitals Were     Pulse Height Pulse Oximetry Breastfeeding? BMI (Body Mass Index)       60 5' 1.5\" (1.562 m) 100% No 38.18 kg/m2        Blood Pressure from Last 3 Encounters:   02/12/18 146/57   02/06/18 140/74   02/02/18 110/66    Weight from Last 3 Encounters:   02/12/18 205 lb 6.4 oz (93.2 kg)   02/02/18 203 lb (92.1 kg)   01/10/18 201 lb (91.2 kg)              We Performed the Following     CBC with platelets     Comprehensive metabolic panel     EKG 12-lead complete w/read - Clinics     Hemoglobin A1c          Today's Medication Changes          These changes are accurate as of 2/12/18 11:08 AM.  If you have any questions, ask your nurse or doctor.               Start taking these medicines.        Dose/Directions    rosuvastatin 10 MG tablet   Commonly known as:  CRESTOR   Used for:  Hyperlipidemia LDL goal <100   Started by:  Shola Benoit MD        Dose:  10 mg   Take 1 tablet (10 mg) by mouth daily   Quantity:  30 tablet   Refills:  11         Stop taking these medicines if you haven't already. Please contact your care team if you have questions.     amLODIPine 5 MG tablet   Commonly known as:  NORVASC   Stopped by:  Shola Benoit MD                Where to get your medicines      These medications were sent to [facility] [address]  [address] [facility] [address]     " Phone:  [phone]     rosuvastatin 10 MG tablet                Primary Care Provider Office Phone # Fax #    Shola Benoit [phone] [address] [address]        Equal Access to Services     CORINNE ROBB : Hadii aad ku hadasho Soomaali, waaxda luqadaha, qaybta kaalmada adeegyada, waxay cyndiin hayaan adejosé miguel gastelumletha lakurt . So [facility] [phone].    ATENCIÓN: Si habla español, tiene a modi disposición servicios gratuitos de asistencia lingüística. Kartikame al [phone].    We comply with applicable federal civil rights laws and [place] laws. We do not discriminate on the basis of race, color, national origin, age, disability, sex, sexual orientation, or gender identity.            Thank you!     Thank you for choosing [facility]  for your care. Our goal is always to provide you with excellent care. Hearing back from our patients is one way we can continue to improve our services. Please take a few minutes to complete the written survey that you may receive in the mail after your visit with us. Thank you!             Your Updated Medication List - Protect others around you: Learn how to safely use, store and throw away your medicines at www.disposemymeds.org.          This list is accurate as of 2/12/18 11:08 AM.  Always use your most recent med list.                   Brand Name Dispense Instructions for use Diagnosis    ascorbic acid 1000 MG Tabs    vitamin C     Take 1,000 mg by mouth daily        ASTEPRO NA           atenolol 50 MG tablet    TENORMIN    180 tablet    Take 1 tablet (50 mg) by mouth 2 times daily    Essential hypertension       BIOTIN PO      Take 1,000 mcg by mouth        blood glucose monitoring test strip    no brand specified    1 Box    Use to test blood sugar 1 times daily or as directed.    Type 2 diabetes mellitus with other specified complication (H)       clotrimazole 1 % cream    LOTRIMIN     Apply topically daily         cycloSPORINE 0.05 % ophthalmic emulsion    RESTASIS     1 drop 2 times daily        diphenhydrAMINE-acetaminophen  MG tablet    TYLENOL PM     Take 1 tablet by mouth At Bedtime Reported on 3/20/2017        famotidine 40 MG tablet    PEPCID     Take 40 mg by mouth At Bedtime        fenofibrate 145 MG tablet     90 tablet    Take 1 tablet (145 mg) by mouth daily    CARDIOVASCULAR SCREENING; LDL GOAL LESS THAN 130       fexofenadine 180 MG tablet    ALLEGRA    120    Take 180 mg by mouth daily.        fluticasone 50 MCG/ACT spray    FLONASE    1 Bottle    Spray 2 sprays in nostril daily 2 sprays in each nostril qd    Seasonal allergic rhinitis due to other allergic trigger       gabapentin 100 MG capsule    NEURONTIN    90 capsule    Take 1 capsule (100 mg) by mouth every evening as needed    Poliomyelitis       hydrochlorothiazide 25 MG tablet    HYDRODIURIL    90 tablet    Take 1 tablet (25 mg) by mouth daily    Essential hypertension       lisinopril 20 MG tablet    PRINIVIL/ZESTRIL    90 tablet    Take 1 tablet (20 mg) by mouth daily    Essential hypertension       metFORMIN 500 MG tablet    GLUCOPHAGE     Take 2 tablets (1,000 mg) by mouth daily (with dinner)        metoclopramide 10 MG tablet    REGLAN     Take 1-2 tabs as needed        multivitamin per tablet     100    ONE DAILY        nitroGLYcerin 0.4 MG sublingual tablet    NITROSTAT    25 tablet    Place 1 tablet (0.4 mg) under the tongue every 5 minutes as needed for chest pain (no more than 3 in one hour; after 3rd, call 911.)    Atypical chest pain       nystatin cream    MYCOSTATIN     Apply topically daily as needed        nystatin-triamcinolone cream    MYCOLOG II     Apply topically daily as needed        ondansetron 4 MG tablet    ZOFRAN     Take 1 tablet by mouth every 6 hours as needed Reported on 3/20/2017        * order for DME     1 Units    Equipment being ordered: Oral appliance for sleep apnea    ANNETTE (obstructive sleep apnea)       *  order for DME     1 each    Donut Pillow    Coccydynia       * order for DME     2 each    Equipment being ordered: Compression stockings - Knee High; 20-30 mmHg compression    Insufficiency, arterial, peripheral (H)       pantoprazole 40 MG EC tablet    PROTONIX     Take 40 mg by mouth 2 times daily        rosuvastatin 10 MG tablet    CRESTOR    30 tablet    Take 1 tablet (10 mg) by mouth daily    Hyperlipidemia LDL goal <100       SB LOW DOSE ASA EC 81 MG EC tablet   Generic drug:  aspirin      Take 81 mg by mouth daily        sitagliptin 50 MG tablet    JANUVIA     Take 50 mg by mouth daily        SYNTHROID 112 MCG tablet   Generic drug:  levothyroxine      Take 112 mcg by mouth daily Take 112 mcg daily except for Friday takes an additional 56 mcg.  Brand name Synthroid        TOUJEO SOLOSTAR 300 UNIT/ML injection   Generic drug:  insulin glargine U-300      Inject Subcutaneous every morning        triamcinolone 0.1 % cream    KENALOG     Apply topically daily        VITAMIN D-3 PO      Take 2 tablets by mouth        * Notice:  This list has 3 medication(s) that are the same as other medications prescribed for you. Read the directions carefully, and ask your doctor or other care provider to review them with you.

## 2018-02-12 NOTE — NURSING NOTE
"Chief Complaint   Patient presents with     Pre-Op Exam       Initial /57 (BP Location: Right arm, Patient Position: Chair, Cuff Size: Adult Large)  Pulse 60  Ht 5' 1.5\" (1.562 m)  Wt 205 lb 6.4 oz (93.2 kg)  SpO2 100%  Breastfeeding? No  BMI 38.18 kg/m2 Estimated body mass index is 38.18 kg/(m^2) as calculated from the following:    Height as of this encounter: 5' 1.5\" (1.562 m).    Weight as of this encounter: 205 lb 6.4 oz (93.2 kg).  Medication Reconciliation: complete     Jemma Ortiz MA     "

## 2018-02-12 NOTE — PROGRESS NOTES
[address] [address]  [phone]  Dept: [phone]    PRE-OP EVALUATION:  Today's date: 2018    Chasity Hodgson (: 1945) presents for pre-operative evaluation assessment as requested by Dr. Ferrera.  She requires evaluation and anesthesia risk assessment prior to undergoing surgery/procedure for treatment of  Right shoulder replacement  .      Date of Surgery/ Procedure: 2018  Time of Surgery/ Procedure: To be determined   Hospital/Surgical Facility: [facility]   Fax number for surgical facility:[phone]  Primary Physician: Shola Benoit  Type of Anesthesia Anticipated: to be determined    Patient has a Health Care Directive or Living Will:  YES     1. NO - Do you have a history of heart attack, stroke, stent, bypass or surgery on an artery in the head, neck, heart or legs?  2. YES - DO YOU EVER HAVE ANY PAIN OR DISCOMFORT IN YOUR CHEST? - occasional chest pain that lasts only a couple minutes - did have recent stress test that showed small are of apical/anterolateral/lateral ischemia - planning for CT angiogram upcoming  3. NO - Do you have a history of  Heart Failure?  4. NO - Are you troubled by shortness of breath when: walking on the level, up a slight hill or at night?  5. NO - Do you currently have a cold, bronchitis or other respiratory infection?  6. NO - Do you have a cough, shortness of breath or wheezing?  7. NO - Do you sometimes get pains in the calves of your legs when you walk?  8. YES - DO YOU OR ANYONE IN YOUR FAMILY HAVE PREVIOUS HISTORY OF BLOOD CLOTS? History of deep vein thrombosis   9. NO - Do you or does anyone in your family have a serious bleeding problem such as prolonged bleeding following surgeries or cuts?  10. YES - HAVE YOU EVER HAD PROBLEMS WITH ANEMIA OR BEEN TOLD TO TAKE IRON PILLS? - currently anemia - taking oral iron,  has yet to follow up in gastroenterology  11. NO - Have you had any  abnormal blood loss such as black, tarry or bloody stools, or abnormal vaginal bleeding?  12. YES - HAVE YOU EVER HAD A BLOOD TRANSFUSION? - none recently   13. NO - Have you or any of your relatives ever had problems with anesthesia?  14. YES - DO YOU HAVE SLEEP APNEA, EXCESSIVE SNORING OR DAYTIME DROWSINESS? - uses CPAP  15. NO - Do you have any prosthetic heart valves?  16. NO - Do you have prosthetic joints?  17. NO - Is there any chance that you may be pregnant?      HPI:                                                      Brief HPI related to upcoming procedure: total shoulder replacement right      See problem list for active medical problems.  Problems all longstanding and stable, except as noted/documented.  See ROS for pertinent symptoms related to these conditions.                                                                                                  .    MEDICAL HISTORY:                                                      Patient Active Problem List    Diagnosis Date Noted     Normocytic anemia 01/16/2017     Priority: Medium     Allergic dermatitis due to other chemical product 10/02/2016     Priority: Medium     Insufficiency, arterial, peripheral (H) 11/08/2015     Priority: Medium     Mild seen on CARMITA 11/2015 - right sided       Type 2 diabetes mellitus with other specified complication (H) 10/18/2015     Priority: Medium     Carotid stenosis 12/09/2014     Priority: Medium     Mild increased stenosis 50-69% left ICA       Right shoulder pain 12/09/2014     Priority: Medium     Left knee pain 11/03/2014     Priority: Medium     Poliomyelitis      Priority: Medium     poor circulation right leg       Diabetic gastroparesis (H)      Priority: Medium     Dermatitis 02/15/2014     Priority: Medium     Acne rosacea 02/15/2014     Priority: Medium     Esophageal reflux 01/23/2014     Priority: Medium     Had upper endoscopy - Dr. Pantoja 2013       Pulmonary nodule 09/06/2013     Priority:  Medium     Alopecia 02/09/2013     Priority: Medium     Problem list name updated by automated process. Provider to review       Papillary carcinoma of thyroid (H)      Priority: Medium     s/p thyroidectomy - Ruegemer       Gastroparesis 11/12/2012     Priority: Medium     Postsurgical hypothyroidism      Priority: Medium     s/p papillary thryoid cancer - Marj  Concern for possible recurrence 03/2014       Type 2 diabetes, HbA1c goal < 7% (H)      Priority: Medium     Advanced directives, counseling/discussion 09/21/2012     Priority: Medium     Discussed advance care planning with patient; information given to patient to review. 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 Provider to review       ANNETTE (obstructive sleep apnea)      Priority: Medium     Mixed hyperlipidemia 10/31/2010     Priority: Medium     Low back pain 07/15/2009     Priority: Medium      Past Medical History:   Diagnosis Date     Abdominal adhesions 1984, 96,99    s/p lysis     Allergic rhinitis, cause unspecified      Carotid stenosis      CPAP (continuous positive airway pressure) dependence      Diabetic gastroparesis (H)      Diet-controlled type 2 diabetes mellitus (H)      DVT of axillary vein, acute right (H)      Fibromyalgia      Gastro-oesophageal reflux disease      Hernia of unspecified site of abdominal cavity without mention of obstruction or gangrene     bilateral     HTN (hypertension)      Hypertriglyceridemia      Obstructive sleep apnea       ANNETTE (obstructive sleep apnea)      Papillary carcinoma of thyroid (H)     s/p thyroidectomy - Ruegemer     PE (pulmonary embolism)      Poliomyelitis     poor circulation right leg     Postsurgical hypothyroidism     s/p papillary thryoid cancer - Ruegemer     Pulmonary embolism (H)      Rosacea      S/P carpal tunnel release     bilateral     S/P hardware removal 01/2014    still with lingering foot pain     S/P shoulder surgery     bilateral     Septic joint (H)     right knee     Venous insufficiency      Venous thrombosis 1999    right axillary vein     Past Surgical History:   Procedure Laterality Date     AMPUTATE TOE(S)  3/15/2012    Procedure:AMPUTATE TOE(S); Surgeon:RUBEN METZ; Location: OR     APPENDECTOMY  1972     ARTHRODESIS FOOT  3/15/2012    Procedure:ARTHRODESIS FOOT; RIGHT TRIPLE ARTHRODESIS, FIFTH TOE AMPUTATION, LATERAL LIGAMENT RECONSTRUCTION, TENDON TRANSFER AND RELEASE [MINI C-ARM, ARTHREX 4.5 AND 6.7 STAPLES, BIOCOMPOSITE TENODESIS SCREWS]; Surgeon:RUBEN METZ; Location: OR      FREEING BOWEL ADHESION,ENTEROLYSIS      1986, 1996, 1999     C NONSPECIFIC PROCEDURE      throidectomy     C TOTAL ABDOM HYSTERECTOMY  1980    + BSO     CHOLECYSTECTOMY       COLOSTOMY  2/7/2012    Procedure:COLOSTOMY; CREATION OF SIGMOID COLOSTOMY AND EXTENSIVE  LYSIS OF ADHESIONS; Surgeon:MONTSERRAT BENDER P; Location: OR     GI SURGERY      weakened rectal sphincter with artificial stimulator     LAPAROTOMY, LYSIS ADHESIONS, COMBINED  2/7/2012    Procedure:COMBINED LAPAROTOMY, LYSIS ADHESIONS; Surgeon:MONTSERRAT BENDER; Location: OR     RELEASE TENDON FOOT  3/15/2012    Procedure:RELEASE TENDON FOOT; Surgeon:RUBEN METZ; Location: OR     REMOVE HARDWARE FOOT  12/13/2012    Procedure: REMOVE HARDWARE FOOT;  RIGHT FOOT REMOVAL OF HARDWARE;  Surgeon: Ruben Metz MD;  Location:  OR     Current Outpatient Prescriptions   Medication Sig Dispense Refill      atenolol (TENORMIN) 50 MG tablet Take 1 tablet (50 mg) by mouth 2 times daily 180 tablet 3     amLODIPine (NORVASC) 5 MG tablet Take 0.5 tablets (2.5 mg) by mouth daily 30 tablet 3     gabapentin (NEURONTIN) 100 MG capsule Take 1 capsule (100 mg) by mouth every evening as needed 90 capsule 3     metFORMIN (GLUCOPHAGE) 500 MG tablet Take 2 tablets (1,000 mg) by mouth daily (with dinner)       hydrochlorothiazide (HYDRODIURIL) 25 MG tablet Take 1 tablet (25 mg) by mouth daily 90 tablet 3     pantoprazole (PROTONIX) 40 MG EC tablet Take 40 mg by mouth 2 times daily       insulin glargine U-300 (TOUJEO SOLOSTAR) 300 UNIT/ML injection Inject Subcutaneous every morning       famotidine (PEPCID) 40 MG tablet Take 40 mg by mouth At Bedtime       Azelastine HCl (ASTEPRO NA)        triamcinolone (KENALOG) 0.1 % cream Apply topically daily       clotrimazole (LOTRIMIN) 1 % cream Apply topically daily       nitroGLYcerin (NITROSTAT) 0.4 MG sublingual tablet Place 1 tablet (0.4 mg) under the tongue every 5 minutes as needed for chest pain (no more than 3 in one hour; after 3rd, call 911.) 25 tablet 3     lisinopril (PRINIVIL/ZESTRIL) 20 MG tablet Take 1 tablet (20 mg) by mouth daily (Patient not taking: Reported on 2/2/2018) 90 tablet 2     fenofibrate 145 MG tablet Take 1 tablet (145 mg) by mouth daily 90 tablet 2     order for DME Equipment being ordered: Compression stockings - Knee High; 20-30 mmHg compression 2 each 0     order for DME Donut Pillow 1 each 0     metoclopramide (REGLAN) 10 MG tablet Take 1-2 tabs as needed       fluticasone (FLONASE) 50 MCG/ACT spray Spray 2 sprays in nostril daily 2 sprays in each nostril qd 1 Bottle 0     order for DME Equipment being ordered: Oral appliance for sleep apnea 1 Units 0     cycloSPORINE (RESTASIS) 0.05 % ophthalmic emulsion 1 drop 2 times daily       sitagliptin (JANUVIA) 50 MG tablet Take 50 mg by mouth daily       Cholecalciferol (VITAMIN D-3 PO) Take 2 tablets by mouth        BIOTIN PO Take 1,000 mcg by mouth       blood glucose monitoring (NO BRAND SPECIFIED) test strip Use to test blood sugar 1 times daily or as directed. 1 Box 6     nystatin-triamcinolone (MYCOLOG II) cream Apply topically daily as needed       ascorbic acid (VITAMIN C) 1000 MG TABS Take 1,000 mg by mouth daily       ondansetron (ZOFRAN) 4 MG tablet Take 1 tablet by mouth every 6 hours as needed Reported on 3/20/2017       aspirin (SB LOW DOSE ASA EC) 81 MG EC tablet Take 81 mg by mouth daily       nystatin (MYCOSTATIN) cream Apply topically daily as needed        levothyroxine (SYNTHROID) 112 MCG tablet Take 112 mcg by mouth daily Take 112 mcg daily except for Friday takes an additional 56 mcg.  Brand name Synthroid       diphenhydrAMINE-acetaminophen (TYLENOL PM)  MG tablet Take 1 tablet by mouth At Bedtime Reported on 3/20/2017       fexofenadine (ALLEGRA) 180 MG tablet Take 180 mg by mouth daily. 120 0     MULTIVITAMINS OR TABS ONE DAILY 100 3     OTC products: Aspirin    Allergies   Allergen Reactions     Nsaids Difficulty breathing     Increased creatinine     Toradol Difficulty breathing     Shortness of breath     Celecoxib Itching and Rash     Codeine Itching     With higher doses     No Clinical Screening - See Comments Itching     Fragrance     Vioxx Other (See Comments)     Heart races     Conjugated Estrogens Rash     Sulfa Drugs Rash      Latex Allergy: NO    Social History   Substance Use Topics     Smoking status: Never Smoker     Smokeless tobacco: Never Used     Alcohol use No      Comment: very rare     History   Drug Use No       REVIEW OF SYSTEMS:                                                    C: NEGATIVE for fever, chills, change in weight  I: NEGATIVE for worrisome rashes, moles or lesions  E: NEGATIVE for vision changes or irritation  E/M: NEGATIVE for ear, mouth and throat problems  R: NEGATIVE for significant cough or SOB  B: NEGATIVE for masses, tenderness or discharge  CV:  "recent positive stress test - will follow up in cardiology   GI: improved abdominal pain, stoma with occasional blood - recent abdominal CT within normal limits   : NEGATIVE for frequency, dysuria, or hematuria  M: NEGATIVE for significant arthralgias or myalgia  N: NEGATIVE for weakness, dizziness or paresthesias  E: NEGATIVE for temperature intolerance, skin/hair changes  H: Notable anemia and occasional bleeding from stoma  P: NEGATIVE for changes in mood or affect    EXAM:                                                    /81 (BP Location: Right arm, Patient Position: Chair, Cuff Size: Adult Regular)  Pulse 60  Ht 5' 1.5\" (1.562 m)  Wt 205 lb 6.4 oz (93.2 kg)  SpO2 100%  Breastfeeding? No  BMI 38.18 kg/m2    GENERAL APPEARANCE: healthy, alert and no distress     EYES: EOMI, PERRL     HENT: ear canals and TM's normal and nose and mouth without ulcers or lesions     NECK: no adenopathy, no asymmetry, masses, or scars and thyroid normal to palpation     RESP: lungs clear to auscultation - no rales, rhonchi or wheezes     CV: regular rates and rhythm, normal S1 S2, no S3 or S4 and no murmur, click or rub     ABDOMEN:  soft, nontender, ostomy in place     MS:  Support brace on lower extremity - 2+ edema.     SKIN: no suspicious lesions or rashes     NEURO: Normal strength and tone, sensory exam grossly normal, mentation intact and speech normal     PSYCH: mentation appears normal. and affect normal/bright     LYMPHATICS: No axillary, cervical, or supraclavicular nodes    DIAGNOSTICS:                                                      EKG: sinus bradycardia, normal axis, normal intervals, no acute ST/T changes c/w ischemia, no LVH by voltage criteria, unchanged from previous tracings  Labs Resulted Today:   Results for orders placed or performed in visit on 02/12/18   Hemoglobin A1c   Result Value Ref Range    Hemoglobin A1C 6.5 (H) 4.3 - 6.0 %   CBC with platelets   Result Value Ref Range    WBC 7.4 " 4.0 - 11.0 10e9/L    RBC Count 3.66 (L) 3.8 - 5.2 10e12/L    Hemoglobin 11.0 (L) 11.7 - 15.7 g/dL    Hematocrit 33.0 (L) 35.0 - 47.0 %    MCV 90 78 - 100 fl    MCH 30.1 26.5 - 33.0 pg    MCHC 33.3 31.5 - 36.5 g/dL    RDW 12.6 10.0 - 15.0 %    Platelet Count 221 150 - 450 10e9/L   Comprehensive metabolic panel   Result Value Ref Range    Sodium 139 133 - 144 mmol/L    Potassium 4.3 3.4 - 5.3 mmol/L    Chloride 104 94 - 109 mmol/L    Carbon Dioxide 29 20 - 32 mmol/L    Anion Gap 6 3 - 14 mmol/L    Glucose 131 (H) 70 - 99 mg/dL    Urea Nitrogen 35 (H) 7 - 30 mg/dL    Creatinine 1.33 (H) 0.52 - 1.04 mg/dL    GFR Estimate 39 (L) >60 mL/min/1.7m2    GFR Estimate If Black 48 (L) >60 mL/min/1.7m2    Calcium 9.4 8.5 - 10.1 mg/dL    Bilirubin Total 0.3 0.2 - 1.3 mg/dL    Albumin 3.7 3.4 - 5.0 g/dL    Protein Total 7.0 6.8 - 8.8 g/dL    Alkaline Phosphatase 39 (L) 40 - 150 U/L    ALT 29 0 - 50 U/L    AST 28 0 - 45 U/L     Labs Drawn and in Process:   Unresulted Labs Ordered in the Past 30 Days of this Admission     No orders found from 12/14/2017 to 2/13/2018.          Recent Labs   Lab Test  02/02/18   0959  01/25/18   1304   01/02/18   1801  02/14/17 01/16/17   1512  01/10/17   1407   11/18/13   1258  10/23/13   1056   HGB   --    --    --   11.6*   --    --    --   12.9  11.0*   < >   --    --    PLT   --    --    --   248   --    --    --   219  201   < >   --    --    INR   --    --    --    --    --    --    --    --    --    --   2.00*  2.30*   NA  134*  140   < >   --    < >   --    --   140  136   < >   --    --    POTASSIUM  4.5  4.7   < >   --    < >   --    --   4.4  4.7   < >   --    --    CR  1.35*  1.54*   < >   --    < >   --    < >  0.98  0.96   < >   --    --    A1C   --    --    --    --    --   8.5   --    --   7.4*   < >   --    --     < > = values in this interval not displayed.        IMPRESSION:                                                    Reason for surgery/procedure: degenerative joint disease  shoulder  Diagnosis/reason for consult: Pre-operative Evaluation    The proposed surgical procedure is considered INTERMEDIATE risk.    REVISED CARDIAC RISK INDEX  The patient has the following serious cardiovascular risks for perioperative complications such as (MI, PE, VFib and 3  AV Block):  Coronary Artery Disease (MI, positive stress test, angina, Qs on EKG)  Diabetes Mellitus (on Insulin)  INTERPRETATION: 2 risks: Class III (moderate risk - 6.6% complication rate)    The patient has the following additional risks for perioperative complications:  No identified additional risks      ICD-10-CM    1. Preop general physical exam Z01.818        RECOMMENDATIONS:                                                      (Z01.818) Preop general physical exam  (primary encounter diagnosis)  Comment: We will await upcoming discussion and CT angiogram with cardiology.  For now, continue on aspirin 81 mg, statin, blood pressure and ace inhibitor. If cleared from cardiac stand point we will hold metformin and januvia and take 1/2 of your normal insulin dose that morning.  Hold hydrochlorothiazide and lisinopril on the day of surgery as well.    Plan: CBC with platelets, Comprehensive metabolic         panel, EKG 12-lead complete w/read - Clinics            (E11.69) Type 2 diabetes mellitus with other specified complication, without long-term current use of insulin (H)  Comment: As above - hold metformin and januvia as we discussed on the day of surgery  Plan: Hemoglobin A1c, CBC with platelets,         Comprehensive metabolic panel            (K21.9) Gastroesophageal reflux disease without esophagitis  Comment: Continue protonix  Plan:     (G47.33) ANNETTE (obstructive sleep apnea)  Comment: not using CPAP currently  Plan:     (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    [facility] Preop Guidelines

## 2018-02-13 ENCOUNTER — TELEPHONE (OUTPATIENT)
Dept: FAMILY MEDICINE | Facility: CLINIC | Age: 73
End: 2018-02-13

## 2018-02-13 LAB
ALBUMIN SERPL-MCNC: 3.7 G/DL (ref 3.4–5)
ALP SERPL-CCNC: 39 U/L (ref 40–150)
ALT SERPL W P-5'-P-CCNC: 29 U/L (ref 0–50)
ANION GAP SERPL CALCULATED.3IONS-SCNC: 6 MMOL/L (ref 3–14)
AST SERPL W P-5'-P-CCNC: 28 U/L (ref 0–45)
BILIRUB SERPL-MCNC: 0.3 MG/DL (ref 0.2–1.3)
BUN SERPL-MCNC: 35 MG/DL (ref 7–30)
CALCIUM SERPL-MCNC: 9.4 MG/DL (ref 8.5–10.1)
CHLORIDE SERPL-SCNC: 104 MMOL/L (ref 94–109)
CO2 SERPL-SCNC: 29 MMOL/L (ref 20–32)
CREAT SERPL-MCNC: 1.33 MG/DL (ref 0.52–1.04)
GFR SERPL CREATININE-BSD FRML MDRD: 39 ML/MIN/1.7M2
GLUCOSE SERPL-MCNC: 131 MG/DL (ref 70–99)
POTASSIUM SERPL-SCNC: 4.3 MMOL/L (ref 3.4–5.3)
PROT SERPL-MCNC: 7 G/DL (ref 6.8–8.8)
SODIUM SERPL-SCNC: 139 MMOL/L (ref 133–144)

## 2018-02-13 ASSESSMENT — PATIENT HEALTH QUESTIONNAIRE - PHQ9: SUM OF ALL RESPONSES TO PHQ QUESTIONS 1-9: 2

## 2018-02-14 ENCOUNTER — DOCUMENTATION ONLY (OUTPATIENT)
Dept: CARDIOLOGY | Facility: CLINIC | Age: 73
End: 2018-02-14

## 2018-02-14 NOTE — PROGRESS NOTES
Call from patient wondering when she needs to schedule an OV next. Reviewed patient's chart. Patient is scheduled for a CTa on Friday due to an abnormal stress test. Patient is scheduled for shoulder surgery on 2/22/18. Reviewed with patient that we will call her with the results of her test on Friday and follow up will be determined once those results are in. Patient verbalized understanding and agreed with plan of care.

## 2018-02-14 NOTE — TELEPHONE ENCOUNTER
Reason for Call:  call back    Detailed comments: patient said  left her a Message? Not seeing an Open Encounter  Pleas call her back    Phone Number Patient can be reached at: Home number on file [phone] (home)    Best Time: anytime    Can we leave a detailed message on this number? YES    Call taken on 2/13/2018 at 6:24 PM by Jimmie Gabriel

## 2018-02-14 NOTE — PROGRESS NOTES
Gerard Cochran,    I had the opportunity to review your recent labs and a summary of your labs reads as follows:    Your complete blood counts show no still anemia and I would recommend a follow up in gastroenterology as we discussed  Your comprehensive metabolic panel showed stable impaired renal function, normal liver function      Sincerely,  Shola Benoit MD

## 2018-02-16 ENCOUNTER — TELEPHONE (OUTPATIENT)
Dept: FAMILY MEDICINE | Facility: CLINIC | Age: 73
End: 2018-02-16

## 2018-02-16 ENCOUNTER — TELEPHONE (OUTPATIENT)
Dept: CARDIOLOGY | Facility: CLINIC | Age: 73
End: 2018-02-16

## 2018-02-16 NOTE — TELEPHONE ENCOUNTER
Call from CT technician: patient was unable to have CT scan today due to poor renal labs. Dr. Ortiz to recommend alternative plan. Patient wants to continue plan for shoulder surgery on Thursday, 2/15/18 and needs cardiac clearance from Dr. Ortiz.    Call from Dr. Ortiz: patient was unable to have CT scan today due to creatinine and GFR. I spoke directly with the patient to discuss this. Have the patient see CHRIS on Tuesday to start a nitroglycerine patch. I will clear her for surgery next Thursday.    Contacted scheduling: patient to see CHRIS Imani Louie at 8:30 AM on Tuesday, 2/20/18.  Attempted to contact patient with appointment, left message with spouse to have her call back    Spoke with patient to let her know the appointment is set up for Tuesday at 8:30 AM. She would like to have a written consent for her surgery from Dr. Ortiz.

## 2018-02-16 NOTE — TELEPHONE ENCOUNTER
Reason for Call:  Other call back    Detailed comments: pt is looking o see if dr. cao sent over assessment from pre op. Please all her to follow up    Phone Number Patient can be reached at: Home number on file [phone] (home)    Best Time: anytime    Can we leave a detailed message on this number? YES    Call taken on 2/16/2018 at 2:59 PM by Afsaneh Mann

## 2018-02-20 ENCOUNTER — OFFICE VISIT (OUTPATIENT)
Dept: CARDIOLOGY | Facility: CLINIC | Age: 73
End: 2018-02-20
Payer: MEDICARE

## 2018-02-20 ENCOUNTER — DOCUMENTATION ONLY (OUTPATIENT)
Dept: CARDIOLOGY | Facility: CLINIC | Age: 73
End: 2018-02-20

## 2018-02-20 ENCOUNTER — TELEPHONE (OUTPATIENT)
Dept: FAMILY MEDICINE | Facility: CLINIC | Age: 73
End: 2018-02-20

## 2018-02-20 VITALS
OXYGEN SATURATION: 99 % | HEIGHT: 62 IN | HEART RATE: 56 BPM | DIASTOLIC BLOOD PRESSURE: 70 MMHG | WEIGHT: 205 LBS | BODY MASS INDEX: 37.73 KG/M2 | SYSTOLIC BLOOD PRESSURE: 122 MMHG

## 2018-02-20 DIAGNOSIS — I73.9 INSUFFICIENCY, ARTERIAL, PERIPHERAL (H): ICD-10-CM

## 2018-02-20 DIAGNOSIS — I25.10 CORONARY ARTERY DISEASE INVOLVING NATIVE HEART WITHOUT ANGINA PECTORIS, UNSPECIFIED VESSEL OR LESION TYPE: Primary | ICD-10-CM

## 2018-02-20 PROCEDURE — 99214 OFFICE O/P EST MOD 30 MIN: CPT | Performed by: NURSE PRACTITIONER

## 2018-02-20 RX ORDER — FERROUS SULFATE 325(65) MG
325 TABLET ORAL
Status: ON HOLD | COMMUNITY
End: 2023-09-01

## 2018-02-20 RX ORDER — ATORVASTATIN CALCIUM 20 MG/1
20 TABLET, FILM COATED ORAL DAILY
COMMUNITY
End: 2018-03-14

## 2018-02-20 RX ORDER — SUCRALFATE ORAL 1 G/10ML
1 SUSPENSION ORAL 2 TIMES DAILY PRN
COMMUNITY
End: 2018-03-07

## 2018-02-20 NOTE — PROGRESS NOTES
HPI and Plan:   See dictation    No orders of the defined types were placed in this encounter.    Orders Placed This Encounter   Medications     atorvastatin (LIPITOR) 20 MG tablet     Sig: Take 20 mg by mouth daily     ferrous sulfate (IRON) 325 (65 FE) MG tablet     Sig: Take 325 mg by mouth daily (with breakfast)     sucralfate (CARAFATE) 1 GM/10ML suspension     Sig: Take 1 g by mouth 2 times daily as needed     nitroGLYcerin (NITRODUR) 0.3 MG/HR 24 hr patch     Sig: Place 1 patch onto the skin daily     Dispense:  30 patch     Refill:  1     Medications Discontinued During This Encounter   Medication Reason     rosuvastatin (CRESTOR) 10 MG tablet          Encounter Diagnoses   Name Primary?     Coronary artery disease involving native heart without angina pectoris, unspecified vessel or lesion type Yes     Insufficiency, arterial, peripheral (H)        CURRENT MEDICATIONS:  Current Outpatient Prescriptions   Medication Sig Dispense Refill     atorvastatin (LIPITOR) 20 MG tablet Take 20 mg by mouth daily       ferrous sulfate (IRON) 325 (65 FE) MG tablet Take 325 mg by mouth daily (with breakfast)       sucralfate (CARAFATE) 1 GM/10ML suspension Take 1 g by mouth 2 times daily as needed       nitroGLYcerin (NITRODUR) 0.3 MG/HR 24 hr patch Place 1 patch onto the skin daily 30 patch 1     atenolol (TENORMIN) 50 MG tablet Take 1 tablet (50 mg) by mouth 2 times daily 180 tablet 3     gabapentin (NEURONTIN) 100 MG capsule Take 1 capsule (100 mg) by mouth every evening as needed 90 capsule 3     metFORMIN (GLUCOPHAGE) 500 MG tablet Take 2 tablets (1,000 mg) by mouth daily (with dinner)       hydrochlorothiazide (HYDRODIURIL) 25 MG tablet Take 1 tablet (25 mg) by mouth daily 90 tablet 3     pantoprazole (PROTONIX) 40 MG EC tablet Take 40 mg by mouth 2 times daily       insulin glargine U-300 (TOUJEO SOLOSTAR) 300 UNIT/ML injection Inject Subcutaneous every morning       famotidine (PEPCID) 40 MG tablet Take 40 mg by  mouth At Bedtime       Azelastine HCl (ASTEPRO NA)        triamcinolone (KENALOG) 0.1 % cream Apply topically daily       clotrimazole (LOTRIMIN) 1 % cream Apply topically daily       nitroGLYcerin (NITROSTAT) 0.4 MG sublingual tablet Place 1 tablet (0.4 mg) under the tongue every 5 minutes as needed for chest pain (no more than 3 in one hour; after 3rd, call 911.) 25 tablet 3     lisinopril (PRINIVIL/ZESTRIL) 20 MG tablet Take 1 tablet (20 mg) by mouth daily 90 tablet 2     fenofibrate 145 MG tablet Take 1 tablet (145 mg) by mouth daily 90 tablet 2     order for DME Equipment being ordered: Compression stockings - Knee High; 20-30 mmHg compression 2 each 0     order for DME Donut Pillow 1 each 0     metoclopramide (REGLAN) 10 MG tablet Take 1-2 tabs as needed       fluticasone (FLONASE) 50 MCG/ACT spray Spray 2 sprays in nostril daily 2 sprays in each nostril qd 1 Bottle 0     order for DME Equipment being ordered: Oral appliance for sleep apnea 1 Units 0     cycloSPORINE (RESTASIS) 0.05 % ophthalmic emulsion 1 drop 2 times daily       sitagliptin (JANUVIA) 50 MG tablet Take 50 mg by mouth daily       Cholecalciferol (VITAMIN D-3 PO) Take 2 tablets by mouth       BIOTIN PO Take 1,000 mcg by mouth       blood glucose monitoring (NO BRAND SPECIFIED) test strip Use to test blood sugar 1 times daily or as directed. 1 Box 6     nystatin-triamcinolone (MYCOLOG II) cream Apply topically daily as needed       ascorbic acid (VITAMIN C) 1000 MG TABS Take 1,000 mg by mouth daily       ondansetron (ZOFRAN) 4 MG tablet Take 1 tablet by mouth every 6 hours as needed Reported on 3/20/2017       aspirin (SB LOW DOSE ASA EC) 81 MG EC tablet Take 81 mg by mouth daily       nystatin (MYCOSTATIN) cream Apply topically daily as needed        levothyroxine (SYNTHROID) 112 MCG tablet Take 112 mcg by mouth daily Take 112 mcg daily except for Friday takes an additional 56 mcg.  Brand name Synthroid       diphenhydrAMINE-acetaminophen  (TYLENOL PM)  MG tablet Take 1 tablet by mouth At Bedtime Reported on 3/20/2017       fexofenadine (ALLEGRA) 180 MG tablet Take 180 mg by mouth daily. 120 0     MULTIVITAMINS OR TABS ONE DAILY 100 3       ALLERGIES     Allergies   Allergen Reactions     Nsaids Difficulty breathing     Increased creatinine     Toradol Difficulty breathing     Shortness of breath     Celecoxib Itching and Rash     Codeine Itching     With higher doses     No Clinical Screening - See Comments Itching     Fragrance     Vioxx Other (See Comments)     Heart races     Conjugated Estrogens Rash     Sulfa Drugs Rash       PAST MEDICAL HISTORY:  Past Medical History:   Diagnosis Date     Abdominal adhesions 1984, 96,99    s/p lysis     Allergic rhinitis, cause unspecified      Carotid stenosis      CPAP (continuous positive airway pressure) dependence      Diabetic gastroparesis (H)      Diet-controlled type 2 diabetes mellitus (H)      DVT of axillary vein, acute right (H)      Fibromyalgia      Gastro-oesophageal reflux disease      Hernia of unspecified site of abdominal cavity without mention of obstruction or gangrene     bilateral     HTN (hypertension)      Hypertriglyceridemia      Obstructive sleep apnea      ANNETTE (obstructive sleep apnea)      Papillary carcinoma of thyroid (H)     s/p thyroidectomy - Ruegemer     PE (pulmonary embolism)      Poliomyelitis     poor circulation right leg     Postsurgical hypothyroidism     s/p papillary thryoid cancer - Ruegemer     Pulmonary embolism (H)      Rosacea      S/P carpal tunnel release     bilateral     S/P hardware removal 01/2014    still with lingering foot pain     S/P shoulder surgery     bilateral     Septic joint (H)     right knee     Venous insufficiency      Venous thrombosis 1999    right axillary vein       PAST SURGICAL HISTORY:  Past Surgical History:   Procedure Laterality Date     AMPUTATE TOE(S)  3/15/2012    Procedure:AMPUTATE TOE(S); Surgeon:RUBEN MOSES  MONTSERRAT; Location: OR     APPENDECTOMY  1972     ARTHRODESIS FOOT  3/15/2012    Procedure:ARTHRODESIS FOOT; RIGHT TRIPLE ARTHRODESIS, FIFTH TOE AMPUTATION, LATERAL LIGAMENT RECONSTRUCTION, TENDON TRANSFER AND RELEASE [MINI C-ARM, ARTHREX 4.5 AND 6.7 STAPLES, BIOCOMPOSITE TENODESIS SCREWS]; Surgeon:SUKHDEEP METZ; Location: OR     C FREEING BOWEL ADHESION,ENTEROLYSIS      1986, 1996, 1999     C NONSPECIFIC PROCEDURE      throidectomy     C TOTAL ABDOM HYSTERECTOMY  1980    + BSO     CHOLECYSTECTOMY       COLOSTOMY  2/7/2012    Procedure:COLOSTOMY; CREATION OF SIGMOID COLOSTOMY AND EXTENSIVE  LYSIS OF ADHESIONS; Surgeon:MONTSERRAT BENDER; Location: OR     GI SURGERY      weakened rectal sphincter with artificial stimulator     LAPAROTOMY, LYSIS ADHESIONS, COMBINED  2/7/2012    Procedure:COMBINED LAPAROTOMY, LYSIS ADHESIONS; Surgeon:MONTSERRAT BENDER; Location: OR     RELEASE TENDON FOOT  3/15/2012    Procedure:RELEASE TENDON FOOT; Surgeon:SUKHDEEP METZ; Location: OR     REMOVE HARDWARE FOOT  12/13/2012    Procedure: REMOVE HARDWARE FOOT;  RIGHT FOOT REMOVAL OF HARDWARE;  Surgeon: Sukhdeep Metz MD;  Location:  OR       FAMILY HISTORY:  Family History   Problem Relation Age of Onset     Arthritis Mother      Hypertension Mother      CEREBROVASCULAR DISEASE Mother      Obesity Mother      HEART DISEASE Mother      MI's     Hypertension Father      Respiratory Father      Adult RDS     DIABETES Father      adult     Arthritis Sister      CANCER Sister      Arthritis Sister      Hypertension Sister      CANCER Sister      colon polup     HEART DISEASE Brother      MI at 54     Hypertension Sister      Lipids Brother      Hypertension Brother      Lipids Sister      Obesity Sister      Obesity Maternal Grandmother      Skin Cancer Maternal Grandmother      skin cancer unknown     CANCER Maternal Grandmother      unknown skin cancer on face       SOCIAL HISTORY:  Social History  "    Social History     Marital status:      Spouse name: N/A     Number of children: N/A     Years of education: N/A     Social History Main Topics     Smoking status: Never Smoker     Smokeless tobacco: Never Used     Alcohol use No      Comment: very rare     Drug use: No     Sexual activity: Not Currently     Partners: Male     Birth control/ protection: Female Surgical     Other Topics Concern     Caffeine Concern Yes     occ     Sleep Concern Yes     Stress Concern Yes     Special Diet Yes     low carb, low sugar      Exercise No     occ. 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[employer]        Review of Systems:  Skin:  Negative     Eyes:  Positive for glasses  ENT:  Negative    Respiratory:  Positive for CPAP;sleep apnea  Cardiovascular:    Positive for;chest pain;palpitations (due to pop)  Gastroenterology: Positive for reflux;nausea;abdominal pain  Genitourinary:  Negative    Musculoskeletal:  Positive for joint pain;arthritis;back pain  Neurologic:  Positive for stroke;headaches;migraine headaches  Psychiatric:  Negative    Heme/Lymph/Imm:  Positive for allergies  Endocrine:  Positive for thyroid disorder;diabetes    Physical Exam:  Vitals: /70  Pulse 56  Ht 1.562 m (5' 1.5\")  Wt 93 kg (205 lb)  SpO2 99%  BMI 38.11 kg/m2    Constitutional:  cooperative obese      Skin:  warm and dry to the touch          Head:  normocephalic        Eyes:  pupils equal and round        Lymph:      ENT:  no pallor or cyanosis        Neck:  JVP normal;no stiffness        Respiratory:  normal breath sounds, clear to auscultation, normal A-P diameter, normal symmetry, normal respiratory excursion, no use of accessory muscles         Cardiac: regular rhythm;normal S1 and S2                                                         GI:  abdomen soft obese      Extremities and Muscular Skeletal:      bilateral LE edema;trace   "   The bottom of her right foot is red and mildly edematous, mild bilateral hand edema    Neurological:  no gross motor deficits        Psych:  Alert and Oriented x 3        Recent Lab Results:  LIPID RESULTS:  Lab Results   Component Value Date    CHOL 202 (H) 08/01/2017    HDL 35 (L) 08/01/2017    LDL 49 02/15/2017    TRIG 451 (H) 08/01/2017    CHOLHDLRATIO 4.6 02/15/2017    CHOLHDLRATIO 4.6 03/31/2015       LIVER ENZYME RESULTS:  Lab Results   Component Value Date    AST 28 02/12/2018    ALT 29 02/12/2018       CBC RESULTS:  Lab Results   Component Value Date    WBC 7.4 02/12/2018    RBC 3.66 (L) 02/12/2018    HGB 11.0 (L) 02/12/2018    HCT 33.0 (L) 02/12/2018    MCV 90 02/12/2018    MCH 30.1 02/12/2018    MCHC 33.3 02/12/2018    RDW 12.6 02/12/2018     02/12/2018       BMP RESULTS:  Lab Results   Component Value Date     02/12/2018    POTASSIUM 4.3 02/12/2018    CHLORIDE 104 02/12/2018    CO2 29 02/12/2018    ANIONGAP 6 02/12/2018     (H) 02/12/2018    BUN 35 (H) 02/12/2018    CR 1.33 (H) 02/12/2018    GFRESTIMATED 40 (L) 02/16/2018    GFRESTBLACK 49 (L) 02/16/2018    RINKU 9.4 02/12/2018        A1C RESULTS:  Lab Results   Component Value Date    A1C 6.5 (H) 02/12/2018       INR RESULTS:  Lab Results   Component Value Date    INR 2.00 (H) 11/18/2013    INR 2.30 (H) 10/23/2013           CC  No referring provider defined for this encounter.

## 2018-02-20 NOTE — LETTER
2/20/2018    Shola Benoit MD, MD  [address] [facility] [address]  [facility] [address]    RE: Chasity Hodgson       Dear Colleague,    I had the pleasure of seeing Chasity Hodgson in the [facility].    HPI and Plan:   See dictation    No orders of the defined types were placed in this encounter.    Orders Placed This Encounter   Medications     atorvastatin (LIPITOR) 20 MG tablet     Sig: Take 20 mg by mouth daily     ferrous sulfate (IRON) 325 (65 FE) MG tablet     Sig: Take 325 mg by mouth daily (with breakfast)     sucralfate (CARAFATE) 1 GM/10ML suspension     Sig: Take 1 g by mouth 2 times daily as needed     nitroGLYcerin (NITRODUR) 0.3 MG/HR 24 hr patch     Sig: Place 1 patch onto the skin daily     Dispense:  30 patch     Refill:  1     Medications Discontinued During This Encounter   Medication Reason     rosuvastatin (CRESTOR) 10 MG tablet          Encounter Diagnoses   Name Primary?     Coronary artery disease involving native heart without angina pectoris, unspecified vessel or lesion type Yes     Insufficiency, arterial, peripheral (H)        CURRENT MEDICATIONS:  Current Outpatient Prescriptions   Medication Sig Dispense Refill     atorvastatin (LIPITOR) 20 MG tablet Take 20 mg by mouth daily       ferrous sulfate (IRON) 325 (65 FE) MG tablet Take 325 mg by mouth daily (with breakfast)       sucralfate (CARAFATE) 1 GM/10ML suspension Take 1 g by mouth 2 times daily as needed       nitroGLYcerin (NITRODUR) 0.3 MG/HR 24 hr patch Place 1 patch onto the skin daily 30 patch 1     atenolol (TENORMIN) 50 MG tablet Take 1 tablet (50 mg) by mouth 2 times daily 180 tablet 3     gabapentin (NEURONTIN) 100 MG capsule Take 1 capsule (100 mg) by mouth every evening as needed 90 capsule 3     metFORMIN (GLUCOPHAGE) 500 MG tablet Take 2 tablets (1,000 mg) by mouth daily (with dinner)       hydrochlorothiazide (HYDRODIURIL) 25 MG tablet Take 1 tablet (25 mg) by mouth daily 90 tablet 3      pantoprazole (PROTONIX) 40 MG EC tablet Take 40 mg by mouth 2 times daily       insulin glargine U-300 (TOUJEO SOLOSTAR) 300 UNIT/ML injection Inject Subcutaneous every morning       famotidine (PEPCID) 40 MG tablet Take 40 mg by mouth At Bedtime       Azelastine HCl (ASTEPRO NA)        triamcinolone (KENALOG) 0.1 % cream Apply topically daily       clotrimazole (LOTRIMIN) 1 % cream Apply topically daily       nitroGLYcerin (NITROSTAT) 0.4 MG sublingual tablet Place 1 tablet (0.4 mg) under the tongue every 5 minutes as needed for chest pain (no more than 3 in one hour; after 3rd, call 911.) 25 tablet 3     lisinopril (PRINIVIL/ZESTRIL) 20 MG tablet Take 1 tablet (20 mg) by mouth daily 90 tablet 2     fenofibrate 145 MG tablet Take 1 tablet (145 mg) by mouth daily 90 tablet 2     order for DME Equipment being ordered: Compression stockings - Knee High; 20-30 mmHg compression 2 each 0     order for DME Donut Pillow 1 each 0     metoclopramide (REGLAN) 10 MG tablet Take 1-2 tabs as needed       fluticasone (FLONASE) 50 MCG/ACT spray Spray 2 sprays in nostril daily 2 sprays in each nostril qd 1 Bottle 0     order for DME Equipment being ordered: Oral appliance for sleep apnea 1 Units 0     cycloSPORINE (RESTASIS) 0.05 % ophthalmic emulsion 1 drop 2 times daily       sitagliptin (JANUVIA) 50 MG tablet Take 50 mg by mouth daily       Cholecalciferol (VITAMIN D-3 PO) Take 2 tablets by mouth       BIOTIN PO Take 1,000 mcg by mouth       blood glucose monitoring (NO BRAND SPECIFIED) test strip Use to test blood sugar 1 times daily or as directed. 1 Box 6     nystatin-triamcinolone (MYCOLOG II) cream Apply topically daily as needed       ascorbic acid (VITAMIN C) 1000 MG TABS Take 1,000 mg by mouth daily       ondansetron (ZOFRAN) 4 MG tablet Take 1 tablet by mouth every 6 hours as needed Reported on 3/20/2017       aspirin (SB LOW DOSE ASA EC) 81 MG EC tablet Take 81 mg by mouth daily       nystatin (MYCOSTATIN) cream Apply  topically daily as needed        levothyroxine (SYNTHROID) 112 MCG tablet Take 112 mcg by mouth daily Take 112 mcg daily except for Friday takes an additional 56 mcg.  Brand name Synthroid       diphenhydrAMINE-acetaminophen (TYLENOL PM)  MG tablet Take 1 tablet by mouth At Bedtime Reported on 3/20/2017       fexofenadine (ALLEGRA) 180 MG tablet Take 180 mg by mouth daily. 120 0     MULTIVITAMINS OR TABS ONE DAILY 100 3       ALLERGIES     Allergies   Allergen Reactions     Nsaids Difficulty breathing     Increased creatinine     Toradol Difficulty breathing     Shortness of breath     Celecoxib Itching and Rash     Codeine Itching     With higher doses     No Clinical Screening - See Comments Itching     Fragrance     Vioxx Other (See Comments)     Heart races     Conjugated Estrogens Rash     Sulfa Drugs Rash       PAST MEDICAL HISTORY:  Past Medical History:   Diagnosis Date     Abdominal adhesions 1984, 96,99    s/p lysis     Allergic rhinitis, cause unspecified      Carotid stenosis      CPAP (continuous positive airway pressure) dependence      Diabetic gastroparesis (H)      Diet-controlled type 2 diabetes mellitus (H)      DVT of axillary vein, acute right (H)      Fibromyalgia      Gastro-oesophageal reflux disease      Hernia of unspecified site of abdominal cavity without mention of obstruction or gangrene     bilateral     HTN (hypertension)      Hypertriglyceridemia      Obstructive sleep apnea      ANNETTE (obstructive sleep apnea)      Papillary carcinoma of thyroid (H)     s/p thyroidectomy - Ruegemer     PE (pulmonary embolism)      Poliomyelitis     poor circulation right leg     Postsurgical hypothyroidism     s/p papillary thryoid cancer - Ruegemer     Pulmonary embolism (H)      Rosacea      S/P carpal tunnel release     bilateral     S/P hardware removal 01/2014    still with lingering foot pain     S/P shoulder surgery     bilateral     Septic joint (H)     right knee     Venous  insufficiency      Venous thrombosis 1999    right axillary vein       PAST SURGICAL HISTORY:  Past Surgical History:   Procedure Laterality Date     AMPUTATE TOE(S)  3/15/2012    Procedure:AMPUTATE TOE(S); Surgeon:SUKHDEEP METZ; Location: OR     APPENDECTOMY  1972     ARTHRODESIS FOOT  3/15/2012    Procedure:ARTHRODESIS FOOT; RIGHT TRIPLE ARTHRODESIS, FIFTH TOE AMPUTATION, LATERAL LIGAMENT RECONSTRUCTION, TENDON TRANSFER AND RELEASE [MINI C-ARM, ARTHREX 4.5 AND 6.7 STAPLES, BIOCOMPOSITE TENODESIS SCREWS]; Surgeon:SUKHDEEP METZ; Location: OR     C FREEING BOWEL ADHESION,ENTEROLYSIS      1986, 1996, 1999     C NONSPECIFIC PROCEDURE      throidectomy     C TOTAL ABDOM HYSTERECTOMY  1980    + BSO     CHOLECYSTECTOMY       COLOSTOMY  2/7/2012    Procedure:COLOSTOMY; CREATION OF SIGMOID COLOSTOMY AND EXTENSIVE  LYSIS OF ADHESIONS; Surgeon:MONTSERRAT BENDER; Location: OR     GI SURGERY      weakened rectal sphincter with artificial stimulator     LAPAROTOMY, LYSIS ADHESIONS, COMBINED  2/7/2012    Procedure:COMBINED LAPAROTOMY, LYSIS ADHESIONS; Surgeon:MONTSERRAT BENDER; Location: OR     RELEASE TENDON FOOT  3/15/2012    Procedure:RELEASE TENDON FOOT; Surgeon:SUKHDEEP METZ; Location: OR     REMOVE HARDWARE FOOT  12/13/2012    Procedure: REMOVE HARDWARE FOOT;  RIGHT FOOT REMOVAL OF HARDWARE;  Surgeon: Sukhdeep Metz MD;  Location:  OR       FAMILY HISTORY:  Family History   Problem Relation Age of Onset     Arthritis Mother      Hypertension Mother      CEREBROVASCULAR DISEASE Mother      Obesity Mother      HEART DISEASE Mother      MI's     Hypertension Father      Respiratory Father      Adult RDS     DIABETES Father      adult     Arthritis Sister      CANCER Sister      Arthritis Sister      Hypertension Sister      CANCER Sister      colon polup     HEART DISEASE Brother      MI at 54     Hypertension Sister      Lipids Brother      Hypertension Brother       "Lipids Sister      Obesity Sister      Obesity Maternal Grandmother      Skin Cancer Maternal Grandmother      skin cancer unknown     CANCER Maternal Grandmother      unknown skin cancer on face       SOCIAL HISTORY:  Social History     Social History     Marital status:      Spouse name: N/A     Number of children: N/A     Years of education: N/A     Social History Main Topics     Smoking status: Never Smoker     Smokeless tobacco: Never Used     Alcohol use No      Comment: very rare     Drug use: No     Sexual activity: Not Currently     Partners: Male     Birth control/ protection: Female Surgical     Other Topics Concern     Caffeine Concern Yes     occ     Sleep Concern Yes     Stress Concern Yes     Special Diet Yes     low carb, low sugar      Exercise No     occ. 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[employer]        Review of Systems:  Skin:  Negative     Eyes:  Positive for glasses  ENT:  Negative    Respiratory:  Positive for CPAP;sleep apnea  Cardiovascular:    Positive for;chest pain;palpitations (due to pop)  Gastroenterology: Positive for reflux;nausea;abdominal pain  Genitourinary:  Negative    Musculoskeletal:  Positive for joint pain;arthritis;back pain  Neurologic:  Positive for stroke;headaches;migraine headaches  Psychiatric:  Negative    Heme/Lymph/Imm:  Positive for allergies  Endocrine:  Positive for thyroid disorder;diabetes    Physical Exam:  Vitals: /70  Pulse 56  Ht 1.562 m (5' 1.5\")  Wt 93 kg (205 lb)  SpO2 99%  BMI 38.11 kg/m2    Constitutional:  cooperative obese      Skin:  warm and dry to the touch          Head:  normocephalic        Eyes:  pupils equal and round        Lymph:      ENT:  no pallor or cyanosis        Neck:  JVP normal;no stiffness        Respiratory:  normal breath sounds, clear to auscultation, normal A-P diameter, normal symmetry, normal respiratory " excursion, no use of accessory muscles         Cardiac: regular rhythm;normal S1 and S2                                                         GI:  abdomen soft obese      Extremities and Muscular Skeletal:      bilateral LE edema;trace     The bottom of her right foot is red and mildly edematous, mild bilateral hand edema    Neurological:  no gross motor deficits        Psych:  Alert and Oriented x 3        Recent Lab Results:  LIPID RESULTS:  Lab Results   Component Value Date    CHOL 202 (H) 08/01/2017    HDL 35 (L) 08/01/2017    LDL 49 02/15/2017    TRIG 451 (H) 08/01/2017    CHOLHDLRATIO 4.6 02/15/2017    CHOLHDLRATIO 4.6 03/31/2015       LIVER ENZYME RESULTS:  Lab Results   Component Value Date    AST 28 02/12/2018    ALT 29 02/12/2018       CBC RESULTS:  Lab Results   Component Value Date    WBC 7.4 02/12/2018    RBC 3.66 (L) 02/12/2018    HGB 11.0 (L) 02/12/2018    HCT 33.0 (L) 02/12/2018    MCV 90 02/12/2018    MCH 30.1 02/12/2018    MCHC 33.3 02/12/2018    RDW 12.6 02/12/2018     02/12/2018       BMP RESULTS:  Lab Results   Component Value Date     02/12/2018    POTASSIUM 4.3 02/12/2018    CHLORIDE 104 02/12/2018    CO2 29 02/12/2018    ANIONGAP 6 02/12/2018     (H) 02/12/2018    BUN 35 (H) 02/12/2018    CR 1.33 (H) 02/12/2018    GFRESTIMATED 40 (L) 02/16/2018    GFRESTBLACK 49 (L) 02/16/2018    RINKU 9.4 02/12/2018        A1C RESULTS:  Lab Results   Component Value Date    A1C 6.5 (H) 02/12/2018       INR RESULTS:  Lab Results   Component Value Date    INR 2.00 (H) 11/18/2013    INR 2.30 (H) 10/23/2013           CC  No referring provider defined for this encounter.                  Thank you for allowing me to participate in the care of your patient.      Sincerely,     MAGALY Bradford [facility]    cc:   No referring provider defined for this encounter.

## 2018-02-20 NOTE — PROGRESS NOTES
Service Date: 02/20/2018      HISTORY OF PRESENT ILLNESS:  Sammie is a 72-year-old woman who is here today in followup for blood pressure management and to follow some recommendations from her primary cardiologist, Dr. Andreea Ortiz.  Sammie is a [employer] .  Her location is here at the [facility].  More recently,  she has been worked up for a right shoulder replacement.  The surgery is scheduled for later this week.  As part of her workup, she underwent a nuclear stress test that showed small to moderate area of apical anterior/anterolateral/lateral ischemia extending through the apical segments.  There is a second small to moderate area of ischemia at the anterior/anterolateral base.  A CT angio was ordered as a followup to further delineate the abnormalities, but unfortunately due to her renal insufficiency the test could not take place.  In review, Dr. Ortiz discussed this with Chasity.  A decision was made to have her undergo the right shoulder replacement with the addition of long-acting nitro to be used before, during and after her surgery.  Dr. Ortiz anticipates once Sammie has recovered from surgery that a coronary angiogram can then be anticipated.      Chasity has had some degree of atypical chest pain for a long while.  She and Dr. Ortiz has discussed her symptomatology.  It is fairly predictable.  She gets it the most probably driving.  According to Sammie, she gets it most predictably when she drives her car, sometimes when she walks across the skyway.  It is a discomfort that Sammie cannot describe as pressure, stabbing or burning.  She says it comes and goes rather quickly.  At times the pain is under her left breast.  Other times, it is central to her chest.  It is not associated with diaphoresis or shortness of breath.  More recently she noticed when she has more caffeine she will feel the pain more.  It is not associated with exertion.  This past weekend when she was at [employer] it came on.   She took 1 nitro.  It went away.  It returned 20 minutes later, but then it went away all by itself.  That day, Sammie recalls she had a lot of caffeine since then.  She has eliminated caffeine and the pain has not come back.      More recently she has been seen by Leatha for hypertension management.  Her blood pressure today looks excellent at 122/70, heart rate is 56.      Please see physical exam.      IMPRESSION AND PLAN:  Patient with atypical chest pain with abnormal nuclear stress test in the context of planned elective right shoulder replacement.  Her primary cardiologist, Dr. Ortiz, would like to hold off on doing an angiogram in case Sammie may need a stenting procedure which would prevent her from undergoing her shoulder surgery.  Therefore, Dr. Ortiz has ordered Nitro-Dur 0.3 mg per hour patch.  I discussed with Sammie the patch should be on 12 hours, off for 12 hours, where to place it on the body, and to anticipate initial headache until she gets used to it.  I think there is plenty of room in her blood pressure if this might have an impact of lowering her blood pressure.        It has been my pleasure to meet Sammie today.         MAGALY SINGH, CNP             D: 2018   T: 2018   MT: CARMELA      Name:     SUZANNE BROOKE   MRN:      [id number]        Account:      [id number]   :      1945           Service Date: 2018      Document: [id number]

## 2018-02-20 NOTE — PROGRESS NOTES
Call from patient stating that she needs the cardiology note sent to both her surgeon and her PMD prior to her surgery scheduled for her shoulder on 2/22. OV note with Imani Louie from 2/20/18 faxed to both her PMD ([phone]) and Dr. Ferrera ([phone]).

## 2018-02-20 NOTE — MR AVS SNAPSHOT
After Visit Summary   2/20/2018    Chasity Hodgson    MRN: [id number]           Patient Information     Date Of Birth          1945        Visit Information        Provider Department      2/20/2018 8:30 AM Imani Louie, MAGALY BAL [facility]        Today's Diagnoses     Coronary artery disease involving native heart without angina pectoris, unspecified vessel or lesion type    -  1    Insufficiency, arterial, peripheral (H)          Care Instructions    Start nitro patch- on for 12 hours-off for 12 hours          Follow-ups after your visit        Your next 10 appointments already scheduled     Mar 12, 2018 10:30 AM CDT   (Arrive by 10:15 AM)   RETURN [facility] with Renetta Meyer MD   [facility] ([facility] [place])    [address] [place] [id number] [phone]              Who to contact     If you have questions or need follow up information about today's clinic visit or your schedule please contact [facility] directly at [phone].  Normal or non-critical lab and imaging results will be communicated to you by MyChart, letter or phone within 4 business days after the clinic has received the results. If you do not hear from us within 7 days, please contact the clinic through MyChart or phone. If you have a critical or abnormal lab result, we will notify you by phone as soon as possible.  Submit refill requests through [employer] or call your pharmacy and they will forward the refill request to us. Please allow 3 business days for your refill to be completed.          Additional Information About Your Visit        MyChart Information     [employer] gives you secure access to your electronic health record. If you see a primary care provider, you can also send messages to your care team and make appointments. If you have questions, please call your  "primary care clinic.  If you do not have a primary care provider, please call [phone] and they will assist you.        Care EveryWhere ID     This is your Care EveryWhere ID. This could be used by other organizations to access your [place] medical records  [id number]        Your Vitals Were     Pulse Height Pulse Oximetry BMI (Body Mass Index)          56 1.562 m (5' 1.5\") 99% 38.11 kg/m2         Blood Pressure from Last 3 Encounters:   02/20/18 122/70   02/12/18 159/81   02/06/18 140/74    Weight from Last 3 Encounters:   02/20/18 93 kg (205 lb)   02/12/18 93.2 kg (205 lb 6.4 oz)   02/02/18 92.1 kg (203 lb)              Today, you had the following     No orders found for display         Today's Medication Changes          These changes are accurate as of 2/20/18  9:02 AM.  If you have any questions, ask your nurse or doctor.               These medicines have changed or have updated prescriptions.        Dose/Directions    * nitroGLYcerin 0.4 MG sublingual tablet   Commonly known as:  NITROSTAT   This may have changed:  Another medication with the same name was added. Make sure you understand how and when to take each.   Used for:  Atypical chest pain   Changed by:  Imani Louie APRN CNP        Dose:  0.4 mg   Place 1 tablet (0.4 mg) under the tongue every 5 minutes as needed for chest pain (no more than 3 in one hour; after 3rd, call 911.)   Quantity:  25 tablet   Refills:  3       * nitroGLYcerin 0.3 MG/HR 24 hr patch   Commonly known as:  NITRODUR   This may have changed:  You were already taking a medication with the same name, and this prescription was added. Make sure you understand how and when to take each.   Used for:  Coronary artery disease involving native heart without angina pectoris, unspecified vessel or lesion type, Insufficiency, arterial, peripheral (H)   Changed by:  Imani Louie APRN CNP        Dose:  1 patch   Place 1 patch onto the skin daily   Quantity:  30 patch   Refills:  " 1       * Notice:  This list has 2 medication(s) that are the same as other medications prescribed for you. Read the directions carefully, and ask your doctor or other care provider to review them with you.      Stop taking these medicines if you haven't already. Please contact your care team if you have questions.     rosuvastatin 10 MG tablet   Commonly known as:  CRESTOR   Stopped by:  Imani Louie, MAGALY BAL                Where to get your medicines      These medications were sent to [facility] [place] [address]  [address] [facility] [address]     Phone:  [phone]     nitroGLYcerin 0.3 MG/HR 24 hr patch                Primary Care Provider Office Phone # Fax #    Shola Benoit [phone] [address] [facility] [address]  [facility] [address]        Equal Access to Services     [facility]: Hadii domi acosta hadasho Sobhavna, waaxda luqadaha, qaybta kaalmada adeegyada, jose burleson hayantonella baldwin . So [facility] [phone].    ATENCIÓN: Si habla español, tiene a modi disposición servicios gratuitos de asistencia lingüística. Llame al [phone].    We comply with applicable federal civil rights laws and [place] laws. We do not discriminate on the basis of race, color, national origin, age, disability, sex, sexual orientation, or gender identity.            Thank you!     Thank you for choosing [facility]  for your care. Our goal is always to provide you with excellent care. Hearing back from our patients is one way we can continue to improve our services. Please take a few minutes to complete the written survey that you may receive in the mail after your visit with us. Thank you!             Your Updated Medication List - Protect others around you: Learn how to safely use, store and throw away your medicines at www.disposemymeds.org.          This list is accurate as of 2/20/18  9:02 AM.   Always use your most recent med list.                   Brand Name Dispense Instructions for use Diagnosis    ascorbic acid 1000 MG Tabs    vitamin C     Take 1,000 mg by mouth daily        ASTEPRO NA           atenolol 50 MG tablet    TENORMIN    180 tablet    Take 1 tablet (50 mg) by mouth 2 times daily    Essential hypertension       atorvastatin 20 MG tablet    LIPITOR     Take 20 mg by mouth daily        BIOTIN PO      Take 1,000 mcg by mouth        blood glucose monitoring test strip    no brand specified    1 Box    Use to test blood sugar 1 times daily or as directed.    Type 2 diabetes mellitus with other specified complication (H)       clotrimazole 1 % cream    LOTRIMIN     Apply topically daily        cycloSPORINE 0.05 % ophthalmic emulsion    RESTASIS     1 drop 2 times daily        diphenhydrAMINE-acetaminophen  MG tablet    TYLENOL PM     Take 1 tablet by mouth At Bedtime Reported on 3/20/2017        famotidine 40 MG tablet    PEPCID     Take 40 mg by mouth At Bedtime        fenofibrate 145 MG tablet     90 tablet    Take 1 tablet (145 mg) by mouth daily    CARDIOVASCULAR SCREENING; LDL GOAL LESS THAN 130       ferrous sulfate 325 (65 FE) MG tablet    IRON     Take 325 mg by mouth daily (with breakfast)        fexofenadine 180 MG tablet    ALLEGRA    120    Take 180 mg by mouth daily.        fluticasone 50 MCG/ACT spray    FLONASE    1 Bottle    Spray 2 sprays in nostril daily 2 sprays in each nostril qd    Seasonal allergic rhinitis due to other allergic trigger       gabapentin 100 MG capsule    NEURONTIN    90 capsule    Take 1 capsule (100 mg) by mouth every evening as needed    Poliomyelitis       hydrochlorothiazide 25 MG tablet    HYDRODIURIL    90 tablet    Take 1 tablet (25 mg) by mouth daily    Essential hypertension       lisinopril 20 MG tablet    PRINIVIL/ZESTRIL    90 tablet    Take 1 tablet (20 mg) by mouth daily    Essential hypertension       metFORMIN 500 MG tablet     GLUCOPHAGE     Take 2 tablets (1,000 mg) by mouth daily (with dinner)        metoclopramide 10 MG tablet    REGLAN     Take 1-2 tabs as needed        multivitamin per tablet     100    ONE DAILY        * nitroGLYcerin 0.4 MG sublingual tablet    NITROSTAT    25 tablet    Place 1 tablet (0.4 mg) under the tongue every 5 minutes as needed for chest pain (no more than 3 in one hour; after 3rd, call 911.)    Atypical chest pain       * nitroGLYcerin 0.3 MG/HR 24 hr patch    NITRODUR    30 patch    Place 1 patch onto the skin daily    Coronary artery disease involving native heart without angina pectoris, unspecified vessel or lesion type, Insufficiency, arterial, peripheral (H)       nystatin cream    MYCOSTATIN     Apply topically daily as needed        nystatin-triamcinolone cream    MYCOLOG II     Apply topically daily as needed        ondansetron 4 MG tablet    ZOFRAN     Take 1 tablet by mouth every 6 hours as needed Reported on 3/20/2017        * order for DME     1 Units    Equipment being ordered: Oral appliance for sleep apnea    ANNETTE (obstructive sleep apnea)       * order for DME     1 each    Donut Pillow    Coccydynia       * order for DME     2 each    Equipment being ordered: Compression stockings - Knee High; 20-30 mmHg compression    Insufficiency, arterial, peripheral (H)       pantoprazole 40 MG EC tablet    PROTONIX     Take 40 mg by mouth 2 times daily        SB LOW DOSE ASA EC 81 MG EC tablet   Generic drug:  aspirin      Take 81 mg by mouth daily        sitagliptin 50 MG tablet    JANUVIA     Take 50 mg by mouth daily        sucralfate 1 GM/10ML suspension    CARAFATE     Take 1 g by mouth 2 times daily as needed        SYNTHROID 112 MCG tablet   Generic drug:  levothyroxine      Take 112 mcg by mouth daily Take 112 mcg daily except for Friday takes an additional 56 mcg.  Brand name Synthroid        TOUJEO SOLOSTAR 300 UNIT/ML injection   Generic drug:  insulin glargine U-300      Inject  Subcutaneous every morning        triamcinolone 0.1 % cream    KENALOG     Apply topically daily        VITAMIN D-3 PO      Take 2 tablets by mouth        * Notice:  This list has 5 medication(s) that are the same as other medications prescribed for you. Read the directions carefully, and ask your doctor or other care provider to review them with you.

## 2018-02-20 NOTE — TELEPHONE ENCOUNTER
"Routing to PCP as FYI--no action needed.        Dr Andreea Dominguez called with information and recommendation regarding patient's upcoming scheduled shoulder surgery.     Due to abnormal stress test, the recommendation is for patient to have angioplasty \"first\"---then go ahead with shoulder replacement surgery in approx 6 wks.    Cardiologist will be informed regarding this change in surgery scheduling.     Sendy HAYS RN,BSN        "

## 2018-02-20 NOTE — LETTER
2/20/2018      Shola Benoit MD, MD  [address] [facility] [address]  [facility] [address]      RE: Chasity A Rema       Dear Colleague,    I had the pleasure of seeing Chasity Hodgson in the [facility].    Service Date: 02/20/2018      HISTORY OF PRESENT ILLNESS:  Sammie is a 72-year-old woman who is here today in followup for blood pressure management and to follow some recommendations from her primary cardiologist, Dr. Andreea Ortiz.  Sammie is a [employer] .  Her location is here at the [facility].  More recently,  she has been worked up for a right shoulder replacement.  The surgery is scheduled for later this week.  As part of her workup, she underwent a nuclear stress test that showed small to moderate area of apical anterior/anterolateral/lateral ischemia extending through the apical segments.  There is a second small to moderate area of ischemia at the anterior/anterolateral base.  A CT angio was ordered as a followup to further delineate the abnormalities, but unfortunately due to her renal insufficiency the test could not take place.  In review, Dr. Ortiz discussed this with Chasity.  A decision was made to have her undergo the right shoulder replacement with the addition of long-acting nitro to be used before, during and after her surgery.  Dr. Ortiz anticipates once Sammie has recovered from surgery that a coronary angiogram can then be anticipated.      Chasity has had some degree of atypical chest pain for a long while.  She and Dr. Ortiz has discussed her symptomatology.  It is fairly predictable.  She gets it the most probably driving.  According to Sammie, she gets it most predictably when she drives her car, sometimes when she walks [place] skyway.  It is a discomfort that Sammie cannot describe as pressure, stabbing or burning.  She says it comes and goes rather quickly.  At times the pain is under her left breast.  Other times, it is central to her chest.  It  is not associated with diaphoresis or shortness of breath.  More recently she noticed when she has more caffeine she will feel the pain more.  It is not associated with exertion.  This past weekend when she was [place] [employer] it came on.  She took 1 nitro.  It went away.  It returned 20 minutes later, but then it went away all by itself.  That day, Sammie recalls she had a lot of caffeine since then.  She has eliminated caffeine and the pain has not come back.      More recently she has been seen by Leatha for hypertension management.  Her blood pressure today looks excellent at 122/70, heart rate is 56.      Please see physical exam.      IMPRESSION AND PLAN:  Patient with atypical chest pain with abnormal nuclear stress test in the context of planned elective right shoulder replacement.  Her primary cardiologist, Dr. Ortiz, would like to hold off on doing an angiogram in case Sammie may need a stenting procedure which would prevent her from undergoing her shoulder surgery.  Therefore, Dr. Ortiz has ordered Nitro-Dur 0.3 mg per hour patch.  I discussed with Sammie the patch should be on 12 hours, off for 12 hours, where to place it on the body, and to anticipate initial headache until she gets used to it.  I think there is plenty of room in her blood pressure if this might have an impact of lowering her blood pressure.        It has been my pleasure to meet Sammie today.         MAGALY SINGH, CNP             D: 2018   T: 2018   MT: CARMELA      Name:     SUZANNE BROOKE   MRN:      [id number]        Account:      [id number]   :      1945           Service Date: 2018      Document: [id number]           No facility-administered encounter medications on file as of 2018.      Outpatient Encounter Prescriptions as of 2018   Medication Sig Dispense Refill     atorvastatin (LIPITOR) 20 MG tablet Take 20 mg by mouth daily       ferrous sulfate (IRON) 325 (65 FE) MG tablet Take 325 mg by mouth daily  (with breakfast)       sucralfate (CARAFATE) 1 GM/10ML suspension Take 1 g by mouth 2 times daily as needed       nitroGLYcerin (NITRODUR) 0.3 MG/HR 24 hr patch Place 1 patch onto the skin daily 30 patch 1     atenolol (TENORMIN) 50 MG tablet Take 1 tablet (50 mg) by mouth 2 times daily 180 tablet 3     gabapentin (NEURONTIN) 100 MG capsule Take 1 capsule (100 mg) by mouth every evening as needed 90 capsule 3     metFORMIN (GLUCOPHAGE) 500 MG tablet Take 500 mg by mouth daily (with dinner)        hydrochlorothiazide (HYDRODIURIL) 25 MG tablet Take 1 tablet (25 mg) by mouth daily 90 tablet 3     pantoprazole (PROTONIX) 40 MG EC tablet Take 40 mg by mouth 2 times daily       insulin glargine U-300 (TOUJEO SOLOSTAR) 300 UNIT/ML injection Inject 30 Units Subcutaneous every morning Took 14 units this am       famotidine (PEPCID) 40 MG tablet Take 40 mg by mouth At Bedtime       Azelastine HCl (ASTEPRO NA)        triamcinolone (KENALOG) 0.1 % cream Apply topically daily       clotrimazole (LOTRIMIN) 1 % cream Apply topically daily       nitroGLYcerin (NITROSTAT) 0.4 MG sublingual tablet Place 1 tablet (0.4 mg) under the tongue every 5 minutes as needed for chest pain (no more than 3 in one hour; after 3rd, call 911.) 25 tablet 3     lisinopril (PRINIVIL/ZESTRIL) 20 MG tablet Take 1 tablet (20 mg) by mouth daily 90 tablet 2     fenofibrate 145 MG tablet Take 1 tablet (145 mg) by mouth daily 90 tablet 2     order for DME Equipment being ordered: Compression stockings - Knee High; 20-30 mmHg compression 2 each 0     order for DME Donut Pillow 1 each 0     metoclopramide (REGLAN) 10 MG tablet Take 1-2 tabs as needed       fluticasone (FLONASE) 50 MCG/ACT spray Spray 2 sprays in nostril daily 2 sprays in each nostril qd 1 Bottle 0     order for DME Equipment being ordered: Oral appliance for sleep apnea 1 Units 0     cycloSPORINE (RESTASIS) 0.05 % ophthalmic emulsion 1 drop 2 times daily       sitagliptin (JANUVIA) 50 MG  tablet Take 50 mg by mouth daily       Cholecalciferol (VITAMIN D-3 PO) Take 2 tablets by mouth       BIOTIN PO Take 1,000 mcg by mouth       blood glucose monitoring (NO BRAND SPECIFIED) test strip Use to test blood sugar 1 times daily or as directed. 1 Box 6     nystatin-triamcinolone (MYCOLOG II) cream Apply topically daily as needed       ascorbic acid (VITAMIN C) 1000 MG TABS Take 1,000 mg by mouth daily       ondansetron (ZOFRAN) 4 MG tablet Take 1 tablet by mouth every 6 hours as needed Reported on 3/20/2017       aspirin (SB LOW DOSE ASA EC) 81 MG EC tablet Take 81 mg by mouth daily       nystatin (MYCOSTATIN) cream Apply topically daily as needed        levothyroxine (SYNTHROID) 112 MCG tablet Take 112 mcg by mouth daily Take 112 mcg daily except for Friday takes an additional 56 mcg.  Brand name Synthroid       diphenhydrAMINE-acetaminophen (TYLENOL PM)  MG tablet Take 1 tablet by mouth At Bedtime Reported on 3/20/2017       fexofenadine (ALLEGRA) 180 MG tablet Take 180 mg by mouth daily. 120 0     MULTIVITAMINS OR TABS ONE DAILY 100 3     [DISCONTINUED] rosuvastatin (CRESTOR) 10 MG tablet Take 1 tablet (10 mg) by mouth daily 30 tablet 11       Again, thank you for allowing me to participate in the care of your patient.      Sincerely,    MAGALY [facility]

## 2018-02-21 ENCOUNTER — DOCUMENTATION ONLY (OUTPATIENT)
Dept: CARDIOLOGY | Facility: CLINIC | Age: 73
End: 2018-02-21

## 2018-02-21 ENCOUNTER — OFFICE VISIT (OUTPATIENT)
Dept: CARDIOLOGY | Facility: CLINIC | Age: 73
End: 2018-02-21
Payer: MEDICARE

## 2018-02-21 VITALS
WEIGHT: 205 LBS | DIASTOLIC BLOOD PRESSURE: 76 MMHG | HEART RATE: 76 BPM | SYSTOLIC BLOOD PRESSURE: 176 MMHG | BODY MASS INDEX: 37.73 KG/M2 | HEIGHT: 62 IN

## 2018-02-21 DIAGNOSIS — E78.2 MIXED HYPERLIPIDEMIA: ICD-10-CM

## 2018-02-21 DIAGNOSIS — I10 BENIGN ESSENTIAL HYPERTENSION: Primary | ICD-10-CM

## 2018-02-21 DIAGNOSIS — R07.89 ATYPICAL CHEST PAIN: ICD-10-CM

## 2018-02-21 DIAGNOSIS — I25.10 CORONARY ARTERY DISEASE INVOLVING NATIVE CORONARY ARTERY OF NATIVE HEART WITHOUT ANGINA PECTORIS: Primary | ICD-10-CM

## 2018-02-21 PROCEDURE — 99214 OFFICE O/P EST MOD 30 MIN: CPT | Performed by: NURSE PRACTITIONER

## 2018-02-21 NOTE — LETTER
2/21/2018      Shola Benoit MD, MD  [address] [facility] [address]  [facility] [address]      RE: Chasity Hodgson       Dear Colleague,    I had the pleasure of seeing Chasity Hodgson in the [facility].    Service Date: 02/21/2018      HISTORY OF PRESENT ILLNESS:  Chasity Hodgson is a 72-year-old woman who is here today in followup to review recommendations by her primary cardiologist, Dr. Andreea Ortiz, for coronary angiogram.  Mrs. Hodgson has had a history of atypical chest pain.  Dr. Ortiz has followed along with her with this.  Her pain can be fairly predictable.  She notices it the most driving it.  She describes it as a discomfort that is not necessarily pressure, stabbing or burning, it usually comes and goes quickly, more recently has been located under her left breast.  It was not associated with diaphoreses or shortness of breath.  Higher caffeine intake will cause the pain to come on more quickly or come on more often.  The patient recently had a nuclear stress test that demonstrated a small to moderate area of apical anterior/anterolateral/ lateral ischemia extending through the apical segments.  There was a second small to moderate area of ischemia at the anterior/anterolateral base.  Gated images demonstrated normal wall motion and thickening.  The left ventricular systolic function is 70% at rest and 69% post stress.  This study was compared to one done in 04/2014, and that study demonstrated normal perfusion with normal ejection fraction.  The EKG findings demonstrated sinus rhythm.  The stress EKG demonstrated no ST-T changes diagnostic of ischemia or injury.  The patient would like her right shoulder replaced.  This has been scheduled with Dr. Ferrera, but he is requesting a coronary angiogram prior to her shoulder surgery.      PAST MEDICAL HISTORY:   1.  Chronic kidney disease with her last creatinine measured at 1.3.   2.  Hypertension with an elevated blood pressure today  (patient is visibly upset, previous blood pressure 2 days ago demonstrates a blood pressure of 122/70)   3.  The patient also has peripheral artery disease based on evaluation done by Dr. Lazo in 2016.   4.  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  Patient with a right foot brace due to deformity.   5.  Diabetes mellitus type 2.   6.  Hyperlipidemia.   7.  History of hypothyroidism.   8.  History of DVT.   9.  Colostomy in place.      In the past, she has worn a Holter monitor which previously demonstrated a normal sinus rhythm with average heart rate variability with isolated PVCs.      Today, the patient states that since I saw her 2 days ago in the office, she has not had chest pain.      Patient lives with her .  She currently works as a  at [employer].      PHYSICAL EXAMINATION:   GENERAL:  The patient is alert and oriented.  Skin warm and dry.  She is emotionally upset due to the changes of her right shoulder replacement surgery.   VITAL SIGNS:  Blood pressure 176/76, pulse 76, weight today is 205.   CARDIAC:  Heart tones are distant but regular with an S1, S2 without an S3, S4.   LUNGS:  Clear without crackles or wheezes.   ABDOMEN:  Obese.   EXTREMITIES:  Left femoral pulse palpable, unable to palpate right femoral pulse.  Right leg is cooler than left leg (patient states chronic due to polio).  Patient with positive right carotid bruit, without left carotid bruit.  Bilateral radial pulses 2 to 3+.      Previous lab work done in the past demonstrates bilateral ultrasound of the lower extremities done in 2016 that demonstrate edema without significant peripheral artery disease.      IMPRESSION AND PLAN:   1.  Patient is a 72-year-old patient with a history of atypical chest pain with more recent recurrence who underwent a recent nuclear stress test that showed 2 areas of small to moderate ischemia.  This was compared to previous nuclear stress test done in 2014 which  showed normal perfusion.  The patient would like to undergo right shoulder replacement.  A coronary angiogram has been recommended to further work up her abnormal stress test.  Today, I reviewed the risks and the benefits of the procedure with Sammie, risks to include infection, hematoma formation, internal bleeding, dye nephrotoxicity, stroke and death.  I also talked about stenting and dual antiplatelet therapy.  The patient just scheduled for this procedure on Friday.  Her  will be the responsible adult to drive her and bring her back home.  We will set the patient up in followup to review the results of the test and to recommend cardiac rehab.    2.  Patient with a history of hypertension with recent medication changes.  While her blood pressure is elevated today, she was visibly upset with the change in her surgical schedule.  Previously, her blood pressure was well controlled.   3.  Chronic kidney disease.  She will be hydrated before her angiogram.   4.  Hyperlipidemia.  Her LDL is at goal.   5.  Carotid stenosis.  She may need another ultrasound.  We will review this at her followup appointment.      It has been my pleasure to see Sammie again today in clinic.      Imani Louie MS, ANP         MAGALY SINGH, CNP             D: 2018   T: 2018   MT: MARY LOU      Name:     SUZANNE BROOKE   MRN:      [id number]        Account:      [id number]   :      1945           Service Date: 2018      Document: [id number]           Outpatient Encounter Prescriptions as of 2018   Medication Sig Dispense Refill     atorvastatin (LIPITOR) 20 MG tablet Take 20 mg by mouth daily       ferrous sulfate (IRON) 325 (65 FE) MG tablet Take 325 mg by mouth daily (with breakfast)       sucralfate (CARAFATE) 1 GM/10ML suspension Take 1 g by mouth 2 times daily as needed       nitroGLYcerin (NITRODUR) 0.3 MG/HR 24 hr patch Place 1 patch onto the skin daily 30 patch 1     atenolol (TENORMIN) 50 MG tablet  Take 1 tablet (50 mg) by mouth 2 times daily 180 tablet 3     gabapentin (NEURONTIN) 100 MG capsule Take 1 capsule (100 mg) by mouth every evening as needed 90 capsule 3     metFORMIN (GLUCOPHAGE) 500 MG tablet Take 500 mg by mouth daily (with dinner)        hydrochlorothiazide (HYDRODIURIL) 25 MG tablet Take 1 tablet (25 mg) by mouth daily 90 tablet 3     pantoprazole (PROTONIX) 40 MG EC tablet Take 40 mg by mouth 2 times daily       insulin glargine U-300 (TOUJEO SOLOSTAR) 300 UNIT/ML injection Inject 30 Units Subcutaneous every morning Took 14 units this am       famotidine (PEPCID) 40 MG tablet Take 40 mg by mouth At Bedtime       Azelastine HCl (ASTEPRO NA)        triamcinolone (KENALOG) 0.1 % cream Apply topically daily       clotrimazole (LOTRIMIN) 1 % cream Apply topically daily       nitroGLYcerin (NITROSTAT) 0.4 MG sublingual tablet Place 1 tablet (0.4 mg) under the tongue every 5 minutes as needed for chest pain (no more than 3 in one hour; after 3rd, call 911.) 25 tablet 3     lisinopril (PRINIVIL/ZESTRIL) 20 MG tablet Take 1 tablet (20 mg) by mouth daily 90 tablet 2     fenofibrate 145 MG tablet Take 1 tablet (145 mg) by mouth daily 90 tablet 2     order for DME Equipment being ordered: Compression stockings - Knee High; 20-30 mmHg compression 2 each 0     order for DME Donut Pillow 1 each 0     metoclopramide (REGLAN) 10 MG tablet Take 1-2 tabs as needed       fluticasone (FLONASE) 50 MCG/ACT spray Spray 2 sprays in nostril daily 2 sprays in each nostril qd 1 Bottle 0     order for DME Equipment being ordered: Oral appliance for sleep apnea 1 Units 0     cycloSPORINE (RESTASIS) 0.05 % ophthalmic emulsion 1 drop 2 times daily       sitagliptin (JANUVIA) 50 MG tablet Take 50 mg by mouth daily       Cholecalciferol (VITAMIN D-3 PO) Take 2 tablets by mouth       BIOTIN PO Take 1,000 mcg by mouth       blood glucose monitoring (NO BRAND SPECIFIED) test strip Use to test blood sugar 1 times daily or as  directed. 1 Box 6     nystatin-triamcinolone (MYCOLOG II) cream Apply topically daily as needed       ascorbic acid (VITAMIN C) 1000 MG TABS Take 1,000 mg by mouth daily       ondansetron (ZOFRAN) 4 MG tablet Take 1 tablet by mouth every 6 hours as needed Reported on 3/20/2017       aspirin (SB LOW DOSE ASA EC) 81 MG EC tablet Take 81 mg by mouth daily       nystatin (MYCOSTATIN) cream Apply topically daily as needed        levothyroxine (SYNTHROID) 112 MCG tablet Take 112 mcg by mouth daily Take 112 mcg daily except for Friday takes an additional 56 mcg.  Brand name Synthroid       diphenhydrAMINE-acetaminophen (TYLENOL PM)  MG tablet Take 1 tablet by mouth At Bedtime Reported on 3/20/2017       fexofenadine (ALLEGRA) 180 MG tablet Take 180 mg by mouth daily. 120 0     MULTIVITAMINS OR TABS ONE DAILY 100 3     No facility-administered encounter medications on file as of 2/21/2018.        Again, thank you for allowing me to participate in the care of your patient.      Sincerely,    MAGALY [facility]

## 2018-02-21 NOTE — PROGRESS NOTES
HPI and Plan:   See dictation    No orders of the defined types were placed in this encounter.    No orders of the defined types were placed in this encounter.    There are no discontinued medications.      No diagnosis found.    CURRENT MEDICATIONS:  Current Outpatient Prescriptions   Medication Sig Dispense Refill     atorvastatin (LIPITOR) 20 MG tablet Take 20 mg by mouth daily       ferrous sulfate (IRON) 325 (65 FE) MG tablet Take 325 mg by mouth daily (with breakfast)       sucralfate (CARAFATE) 1 GM/10ML suspension Take 1 g by mouth 2 times daily as needed       nitroGLYcerin (NITRODUR) 0.3 MG/HR 24 hr patch Place 1 patch onto the skin daily 30 patch 1     atenolol (TENORMIN) 50 MG tablet Take 1 tablet (50 mg) by mouth 2 times daily 180 tablet 3     gabapentin (NEURONTIN) 100 MG capsule Take 1 capsule (100 mg) by mouth every evening as needed 90 capsule 3     metFORMIN (GLUCOPHAGE) 500 MG tablet Take 2 tablets (1,000 mg) by mouth daily (with dinner)       hydrochlorothiazide (HYDRODIURIL) 25 MG tablet Take 1 tablet (25 mg) by mouth daily 90 tablet 3     pantoprazole (PROTONIX) 40 MG EC tablet Take 40 mg by mouth 2 times daily       insulin glargine U-300 (TOUJEO SOLOSTAR) 300 UNIT/ML injection Inject Subcutaneous every morning       famotidine (PEPCID) 40 MG tablet Take 40 mg by mouth At Bedtime       Azelastine HCl (ASTEPRO NA)        triamcinolone (KENALOG) 0.1 % cream Apply topically daily       clotrimazole (LOTRIMIN) 1 % cream Apply topically daily       nitroGLYcerin (NITROSTAT) 0.4 MG sublingual tablet Place 1 tablet (0.4 mg) under the tongue every 5 minutes as needed for chest pain (no more than 3 in one hour; after 3rd, call 911.) 25 tablet 3     lisinopril (PRINIVIL/ZESTRIL) 20 MG tablet Take 1 tablet (20 mg) by mouth daily 90 tablet 2     fenofibrate 145 MG tablet Take 1 tablet (145 mg) by mouth daily 90 tablet 2     order for DME Equipment being ordered: Compression stockings - Knee High; 20-30  mmHg compression 2 each 0     order for DME Donut Pillow 1 each 0     metoclopramide (REGLAN) 10 MG tablet Take 1-2 tabs as needed       fluticasone (FLONASE) 50 MCG/ACT spray Spray 2 sprays in nostril daily 2 sprays in each nostril qd 1 Bottle 0     order for DME Equipment being ordered: Oral appliance for sleep apnea 1 Units 0     cycloSPORINE (RESTASIS) 0.05 % ophthalmic emulsion 1 drop 2 times daily       sitagliptin (JANUVIA) 50 MG tablet Take 50 mg by mouth daily       Cholecalciferol (VITAMIN D-3 PO) Take 2 tablets by mouth       BIOTIN PO Take 1,000 mcg by mouth       blood glucose monitoring (NO BRAND SPECIFIED) test strip Use to test blood sugar 1 times daily or as directed. 1 Box 6     nystatin-triamcinolone (MYCOLOG II) cream Apply topically daily as needed       ascorbic acid (VITAMIN C) 1000 MG TABS Take 1,000 mg by mouth daily       ondansetron (ZOFRAN) 4 MG tablet Take 1 tablet by mouth every 6 hours as needed Reported on 3/20/2017       aspirin (SB LOW DOSE ASA EC) 81 MG EC tablet Take 81 mg by mouth daily       nystatin (MYCOSTATIN) cream Apply topically daily as needed        levothyroxine (SYNTHROID) 112 MCG tablet Take 112 mcg by mouth daily Take 112 mcg daily except for Friday takes an additional 56 mcg.  Brand name Synthroid       diphenhydrAMINE-acetaminophen (TYLENOL PM)  MG tablet Take 1 tablet by mouth At Bedtime Reported on 3/20/2017       fexofenadine (ALLEGRA) 180 MG tablet Take 180 mg by mouth daily. 120 0     MULTIVITAMINS OR TABS ONE DAILY 100 3       ALLERGIES     Allergies   Allergen Reactions     Nsaids Difficulty breathing     Increased creatinine     Toradol Difficulty breathing     Shortness of breath     Celecoxib Itching and Rash     Codeine Itching     With higher doses     No Clinical Screening - See Comments Itching     Fragrance     Vioxx Other (See Comments)     Heart races     Conjugated Estrogens Rash     Sulfa Drugs Rash       PAST MEDICAL HISTORY:  Past Medical  History:   Diagnosis Date     Abdominal adhesions 1984, 96,99    s/p lysis     Allergic rhinitis, cause unspecified      Carotid stenosis      CPAP (continuous positive airway pressure) dependence      Diabetic gastroparesis (H)      Diet-controlled type 2 diabetes mellitus (H)      DVT of axillary vein, acute right (H)      Fibromyalgia      Gastro-oesophageal reflux disease      Hernia of unspecified site of abdominal cavity without mention of obstruction or gangrene     bilateral     HTN (hypertension)      Hypertriglyceridemia      Obstructive sleep apnea      ANNETTE (obstructive sleep apnea)      Papillary carcinoma of thyroid (H)     s/p thyroidectomy - Ruegemer     PE (pulmonary embolism)      Poliomyelitis     poor circulation right leg     Postsurgical hypothyroidism     s/p papillary thryoid cancer - Ruegemer     Pulmonary embolism (H)      Rosacea      S/P carpal tunnel release     bilateral     S/P hardware removal 01/2014    still with lingering foot pain     S/P shoulder surgery     bilateral     Septic joint (H)     right knee     Venous insufficiency      Venous thrombosis 1999    right axillary vein       PAST SURGICAL HISTORY:  Past Surgical History:   Procedure Laterality Date     AMPUTATE TOE(S)  3/15/2012    Procedure:AMPUTATE TOE(S); Surgeon:RUBEN MOSES; Location: OR     APPENDECTOMY  1972     ARTHRODESIS FOOT  3/15/2012    Procedure:ARTHRODESIS FOOT; RIGHT TRIPLE ARTHRODESIS, FIFTH TOE AMPUTATION, LATERAL LIGAMENT RECONSTRUCTION, TENDON TRANSFER AND RELEASE [MINI C-ARM, ARTHREX 4.5 AND 6.7 STAPLES, BIOCOMPOSITE TENODESIS SCREWS]; Surgeon:RUBEN MOESS; Location: OR      FREEING BOWEL ADHESION,ENTEROLYSIS      1986, 1996, 1999     C NONSPECIFIC PROCEDURE      throidectomy     C TOTAL ABDOM HYSTERECTOMY  1980    + BSO     CHOLECYSTECTOMY       COLOSTOMY  2/7/2012    Procedure:COLOSTOMY; CREATION OF SIGMOID COLOSTOMY AND EXTENSIVE  LYSIS OF ADHESIONS; Surgeon:NAPOLEON  MONTSERRAT BECK; Location:[facility] OR     GI SURGERY      weakened rectal sphincter with artificial stimulator     LAPAROTOMY, LYSIS ADHESIONS, COMBINED  2/7/2012    Procedure:COMBINED LAPAROTOMY, LYSIS ADHESIONS; Surgeon:MONTSERRAT BENDER; Location:[facility] OR     RELEASE TENDON FOOT  3/15/2012    Procedure:RELEASE TENDON FOOT; Surgeon:SUKHDEEP METZ; Location: OR     REMOVE HARDWARE FOOT  12/13/2012    Procedure: REMOVE HARDWARE FOOT;  RIGHT FOOT REMOVAL OF HARDWARE;  Surgeon: Sukhdeep Metz MD;  Location: [facility] OR       FAMILY HISTORY:  Family History   Problem Relation Age of Onset     Arthritis Mother      Hypertension Mother      CEREBROVASCULAR DISEASE Mother      Obesity Mother      HEART DISEASE Mother      MI's     Hypertension Father      Respiratory Father      Adult RDS     DIABETES Father      adult     Arthritis Sister      CANCER Sister      Arthritis Sister      Hypertension Sister      CANCER Sister      colon polup     HEART DISEASE Brother      MI at 54     Hypertension Sister      Lipids Brother      Hypertension Brother      Lipids Sister      Obesity Sister      Obesity Maternal Grandmother      Skin Cancer Maternal Grandmother      skin cancer unknown     CANCER Maternal Grandmother      unknown skin cancer on face       SOCIAL HISTORY:  Social History     Social History     Marital status:      Spouse name: N/A     Number of children: N/A     Years of education: N/A     Social History Main Topics     Smoking status: Never Smoker     Smokeless tobacco: Never Used     Alcohol use No      Comment: very rare     Drug use: No     Sexual activity: Not Currently     Partners: Male     Birth control/ protection: Female Surgical     Other Topics Concern     Caffeine Concern Yes     occ     Sleep Concern Yes     Stress Concern Yes     Special Diet Yes     low carb, low sugar      Exercise No     occ. stationary bike      Seat Belt Yes     Social History Narrative    , 2 children     "Employed in medical records at [employer]        Review of Systems:  Skin:  Negative     Eyes:  Positive for glasses  ENT:  Negative    Respiratory:  Positive for    Cardiovascular:    Positive for;chest pain;palpitations  Gastroenterology: Positive for reflux;nausea;abdominal pain  Genitourinary:  Negative    Musculoskeletal:  Positive for joint pain;arthritis;back pain  Neurologic:  Positive for stroke;headaches;migraine headaches  Psychiatric:  Negative sleep disturbances  Heme/Lymph/Imm:  Positive for allergies  Endocrine:  Positive for thyroid disorder;diabetes    Physical Exam:  Vitals: /76  Pulse 76  Ht 1.562 m (5' 1.5\")  Wt 93 kg (205 lb)  BMI 38.11 kg/m2    Constitutional:  cooperative obese      Skin:  warm and dry to the touch          Head:  normocephalic        Eyes:  pupils equal and round        Lymph:      ENT:  no pallor or cyanosis        Neck:  JVP normal;no stiffness        Respiratory:  normal breath sounds, clear to auscultation, normal A-P diameter, normal symmetry, normal respiratory excursion, no use of accessory muscles         Cardiac: regular rhythm;normal S1 and S2   distant heart sounds                right femoral artery;0 right radial artery;3+ right carotid artery;2+   right popliteal artery;2+     left femoral artery;3+ left radial artery;3+ left carotid artery;2+         right femoral bruit (-)   right leg cool \"since polio\"    GI:  abdomen soft obese      Extremities and Muscular Skeletal:      bilateral LE edema;trace     brace on right ankle    Neurological:    limb weakness;foot drop right LLE    Psych:  Alert and Oriented x 3        Recent Lab Results:  LIPID RESULTS:  Lab Results   Component Value Date    CHOL 202 (H) 08/01/2017    HDL 35 (L) 08/01/2017    LDL 49 02/15/2017    TRIG 451 (H) 08/01/2017    CHOLHDLRATIO 4.6 02/15/2017    CHOLHDLRATIO 4.6 03/31/2015       LIVER ENZYME RESULTS:  Lab Results   Component Value Date    AST 28 02/12/2018    ALT 29 " 02/12/2018       CBC RESULTS:  Lab Results   Component Value Date    WBC 7.4 02/12/2018    RBC 3.66 (L) 02/12/2018    HGB 11.0 (L) 02/12/2018    HCT 33.0 (L) 02/12/2018    MCV 90 02/12/2018    MCH 30.1 02/12/2018    MCHC 33.3 02/12/2018    RDW 12.6 02/12/2018     02/12/2018       BMP RESULTS:  Lab Results   Component Value Date     02/12/2018    POTASSIUM 4.3 02/12/2018    CHLORIDE 104 02/12/2018    CO2 29 02/12/2018    ANIONGAP 6 02/12/2018     (H) 02/12/2018    BUN 35 (H) 02/12/2018    CR 1.33 (H) 02/12/2018    GFRESTIMATED 40 (L) 02/16/2018    GFRESTBLACK 49 (L) 02/16/2018    RINKU 9.4 02/12/2018        A1C RESULTS:  Lab Results   Component Value Date    A1C 6.5 (H) 02/12/2018       INR RESULTS:  Lab Results   Component Value Date    INR 2.00 (H) 11/18/2013    INR 2.30 (H) 10/23/2013           CC  Shola Benoit MD  [address]  [place] [address]

## 2018-02-21 NOTE — MR AVS SNAPSHOT
After Visit Summary   2/21/2018    Chasity Hodgson    MRN: [id number]           Patient Information     Date Of Birth          1945        Visit Information        Provider Department      2/21/2018 2:30 PM Imani Louie APRN CNP [facility]        Care Instructions    Angiography planned for Friday          Follow-ups after your visit        Your next 10 appointments already scheduled     Feb 23, 2018 10:00 AM CST   Cath 90 Minute with SHCVR2   [facility]iac [facility]athe[facility] ([facility])    [address]  [place] [address]   [phone]            Mar 05, 2018  3:00 PM CST   Return Visit with MAGALY Cat CNP   [facility] ([facility])    [address]  [place] [address] [phone]            Mar 12, 2018 10:30 AM CDT   (Arrive by 10:15 AM)   RETURN HAIRLOSS with Renetta Meyer MD   [facility] ([facility])    [address] [place] [id number] [phone]              Future tests that were ordered for you today     Open Future Orders        Priority Expected Expires Ordered    Cardiac Cath: Coronary Angiography Adult Order Routine 2/22/2018 2/21/2019 2/21/2018    Follow-Up with Cardiac [facility] Provider Routine 2/28/2018 2/21/2019 2/21/2018    Follow-Up with Cardiac [facility] Practice Provider Routine 2/22/2018 2/21/2019 2/21/2018    Follow-Up with Cardiologist Routine 4/21/2018 2/20/2019 2/20/2018            Who to contact     If you have questions or need follow up information about today's clinic visit or your schedule please contact [facility] directly at [phone].  Normal or non-critical lab and imaging results will be communicated to you by MyChart, letter or phone within 4 business days after the clinic has  "received the results. If you do not hear from us within 7 days, please contact the clinic through [employer] or phone. If you have a critical or abnormal lab result, we will notify you by phone as soon as possible.  Submit refill requests through [employer] or call your pharmacy and they will forward the refill request to us. Please allow 3 business days for your refill to be completed.          Additional Information About Your Visit        [employer] Monitoring Information     [employer] gives you secure access to your electronic health record. If you see a primary care provider, you can also send messages to your care team and make appointments. If you have questions, please call your primary care clinic.  If you do not have a primary care provider, please call [phone] and they will assist you.        Care EveryWhere ID     This is your Care EveryWhere ID. This could be used by other organizations to access your [place] medical records  [id number]        Your Vitals Were     Pulse Height BMI (Body Mass Index)             76 1.562 m (5' 1.5\") 38.11 kg/m2          Blood Pressure from Last 3 Encounters:   02/21/18 176/76   02/20/18 122/70   02/12/18 159/81    Weight from Last 3 Encounters:   02/21/18 93 kg (205 lb)   02/20/18 93 kg (205 lb)   02/12/18 93.2 kg (205 lb 6.4 oz)              Today, you had the following     No orders found for display       Primary Care Provider Office Phone # Fax #    Shola Benoit [phone] [address] [facility] [address]  [place] [address]        Equal Access to Services     [facility]: Hadii aad ku hadasho Soomaali, waaxda luqadaha, qaybta kaalmada adeegizabel, waxay sarah beth baldwin . So [facility] [phone].    ATENCIÓN: Si habla español, tiene a modi disposición servicios gratuitos de asistencia lingüística. Llame al [phone].    We comply with applicable federal civil rights laws and [place] laws. We do not discriminate on the basis of race, color, " national origin, age, disability, sex, sexual orientation, or gender identity.            Thank you!     Thank you for choosing [facility] HEART [facility]  for your care. Our goal is always to provide you with excellent care. Hearing back from our patients is one way we can continue to improve our services. Please take a few minutes to complete the written survey that you may receive in the mail after your visit with us. Thank you!             Your Updated Medication List - Protect others around you: Learn how to safely use, store and throw away your medicines at www.disposemymeds.org.          This list is accurate as of 2/21/18  3:09 PM.  Always use your most recent med list.                   Brand Name Dispense Instructions for use Diagnosis    ascorbic acid 1000 MG Tabs    vitamin C     Take 1,000 mg by mouth daily        ASTEPRO NA           atenolol 50 MG tablet    TENORMIN    180 tablet    Take 1 tablet (50 mg) by mouth 2 times daily    Essential hypertension       atorvastatin 20 MG tablet    LIPITOR     Take 20 mg by mouth daily        BIOTIN PO      Take 1,000 mcg by mouth        blood glucose monitoring test strip    no brand specified    1 Box    Use to test blood sugar 1 times daily or as directed.    Type 2 diabetes mellitus with other specified complication (H)       clotrimazole 1 % cream    LOTRIMIN     Apply topically daily        cycloSPORINE 0.05 % ophthalmic emulsion    RESTASIS     1 drop 2 times daily        diphenhydrAMINE-acetaminophen  MG tablet    TYLENOL PM     Take 1 tablet by mouth At Bedtime Reported on 3/20/2017        famotidine 40 MG tablet    PEPCID     Take 40 mg by mouth At Bedtime        fenofibrate 145 MG tablet     90 tablet    Take 1 tablet (145 mg) by mouth daily    CARDIOVASCULAR SCREENING; LDL GOAL LESS THAN 130       ferrous sulfate 325 (65 FE) MG tablet    IRON     Take 325 mg by mouth daily (with breakfast)        fexofenadine 180 MG tablet     ALLEGRA    120    Take 180 mg by mouth daily.        fluticasone 50 MCG/ACT spray    FLONASE    1 Bottle    Spray 2 sprays in nostril daily 2 sprays in each nostril qd    Seasonal allergic rhinitis due to other allergic trigger       gabapentin 100 MG capsule    NEURONTIN    90 capsule    Take 1 capsule (100 mg) by mouth every evening as needed    Poliomyelitis       hydrochlorothiazide 25 MG tablet    HYDRODIURIL    90 tablet    Take 1 tablet (25 mg) by mouth daily    Essential hypertension       lisinopril 20 MG tablet    PRINIVIL/ZESTRIL    90 tablet    Take 1 tablet (20 mg) by mouth daily    Essential hypertension       metFORMIN 500 MG tablet    GLUCOPHAGE     Take 2 tablets (1,000 mg) by mouth daily (with dinner)        metoclopramide 10 MG tablet    REGLAN     Take 1-2 tabs as needed        multivitamin per tablet     100    ONE DAILY        * nitroGLYcerin 0.4 MG sublingual tablet    NITROSTAT    25 tablet    Place 1 tablet (0.4 mg) under the tongue every 5 minutes as needed for chest pain (no more than 3 in one hour; after 3rd, call 911.)    Atypical chest pain       * nitroGLYcerin 0.3 MG/HR 24 hr patch    NITRODUR    30 patch    Place 1 patch onto the skin daily    Coronary artery disease involving native heart without angina pectoris, unspecified vessel or lesion type, Insufficiency, arterial, peripheral (H)       nystatin cream    MYCOSTATIN     Apply topically daily as needed        nystatin-triamcinolone cream    MYCOLOG II     Apply topically daily as needed        ondansetron 4 MG tablet    ZOFRAN     Take 1 tablet by mouth every 6 hours as needed Reported on 3/20/2017        * order for DME     1 Units    Equipment being ordered: Oral appliance for sleep apnea    ANNETTE (obstructive sleep apnea)       * order for DME     1 each    Donut Pillow    Coccydynia       * order for DME     2 each    Equipment being ordered: Compression stockings - Knee High; 20-30 mmHg compression    Insufficiency,  arterial, peripheral (H)       pantoprazole 40 MG EC tablet    PROTONIX     Take 40 mg by mouth 2 times daily        SB LOW DOSE ASA EC 81 MG EC tablet   Generic drug:  aspirin      Take 81 mg by mouth daily        sitagliptin 50 MG tablet    JANUVIA     Take 50 mg by mouth daily        sucralfate 1 GM/10ML suspension    CARAFATE     Take 1 g by mouth 2 times daily as needed        SYNTHROID 112 MCG tablet   Generic drug:  levothyroxine      Take 112 mcg by mouth daily Take 112 mcg daily except for Friday takes an additional 56 mcg.  Brand name Synthroid        TOUJEO SOLOSTAR 300 UNIT/ML injection   Generic drug:  insulin glargine U-300      Inject Subcutaneous every morning        triamcinolone 0.1 % cream    KENALOG     Apply topically daily        VITAMIN D-3 PO      Take 2 tablets by mouth        * Notice:  This list has 5 medication(s) that are the same as other medications prescribed for you. Read the directions carefully, and ask your doctor or other care provider to review them with you.

## 2018-02-21 NOTE — PROGRESS NOTES
"Message from Dr. Ortiz \"received a call from Dr. Andreea Nath - patient's shoulder surgery has been canceled until she has an angiogram. Set up CHRIS risk and benefit visit with cardiac cath asap, will need a 4 hour flush prior to procedure. Dr. Nath stated they will do the shoulder surgery with plavix on board if needed and Dr. Ferrera will work her back into his schedule post-angiogram.\"    Orders placed, contacted scheduling and they are holding Friday angiogram time at 10:00 Dr. Bhatia with 6:30 arrival. Scheduling is searching for CHRIS visit spot prior to procedure day.  Spoke with patient's spouse, he will have patient call back. They had not been contacted yet by surgery team.     Spoke with patient to review plan to have angiogram on Friday. Patient is able to come today for CHRIS visit pre-angiogram.  "

## 2018-02-21 NOTE — LETTER
2/21/2018    Shola Benoit MD, MD  [address]  [facility] [address]    RE: Chasity Hodgson       Dear Colleague,    I had the pleasure of seeing Chasity Hodgson in the [facility].    HPI and Plan:   See dictation    No orders of the defined types were placed in this encounter.    No orders of the defined types were placed in this encounter.    There are no discontinued medications.      No diagnosis found.    CURRENT MEDICATIONS:  Current Outpatient Prescriptions   Medication Sig Dispense Refill     atorvastatin (LIPITOR) 20 MG tablet Take 20 mg by mouth daily       ferrous sulfate (IRON) 325 (65 FE) MG tablet Take 325 mg by mouth daily (with breakfast)       sucralfate (CARAFATE) 1 GM/10ML suspension Take 1 g by mouth 2 times daily as needed       nitroGLYcerin (NITRODUR) 0.3 MG/HR 24 hr patch Place 1 patch onto the skin daily 30 patch 1     atenolol (TENORMIN) 50 MG tablet Take 1 tablet (50 mg) by mouth 2 times daily 180 tablet 3     gabapentin (NEURONTIN) 100 MG capsule Take 1 capsule (100 mg) by mouth every evening as needed 90 capsule 3     metFORMIN (GLUCOPHAGE) 500 MG tablet Take 2 tablets (1,000 mg) by mouth daily (with dinner)       hydrochlorothiazide (HYDRODIURIL) 25 MG tablet Take 1 tablet (25 mg) by mouth daily 90 tablet 3     pantoprazole (PROTONIX) 40 MG EC tablet Take 40 mg by mouth 2 times daily       insulin glargine U-300 (TOUJEO SOLOSTAR) 300 UNIT/ML injection Inject Subcutaneous every morning       famotidine (PEPCID) 40 MG tablet Take 40 mg by mouth At Bedtime       Azelastine HCl (ASTEPRO NA)        triamcinolone (KENALOG) 0.1 % cream Apply topically daily       clotrimazole (LOTRIMIN) 1 % cream Apply topically daily       nitroGLYcerin (NITROSTAT) 0.4 MG sublingual tablet Place 1 tablet (0.4 mg) under the tongue every 5 minutes as needed for chest pain (no more than 3 in one hour; after 3rd, call 911.) 25 tablet 3     lisinopril (PRINIVIL/ZESTRIL)  20 MG tablet Take 1 tablet (20 mg) by mouth daily 90 tablet 2     fenofibrate 145 MG tablet Take 1 tablet (145 mg) by mouth daily 90 tablet 2     order for DME Equipment being ordered: Compression stockings - Knee High; 20-30 mmHg compression 2 each 0     order for DME Donut Pillow 1 each 0     metoclopramide (REGLAN) 10 MG tablet Take 1-2 tabs as needed       fluticasone (FLONASE) 50 MCG/ACT spray Spray 2 sprays in nostril daily 2 sprays in each nostril qd 1 Bottle 0     order for DME Equipment being ordered: Oral appliance for sleep apnea 1 Units 0     cycloSPORINE (RESTASIS) 0.05 % ophthalmic emulsion 1 drop 2 times daily       sitagliptin (JANUVIA) 50 MG tablet Take 50 mg by mouth daily       Cholecalciferol (VITAMIN D-3 PO) Take 2 tablets by mouth       BIOTIN PO Take 1,000 mcg by mouth       blood glucose monitoring (NO BRAND SPECIFIED) test strip Use to test blood sugar 1 times daily or as directed. 1 Box 6     nystatin-triamcinolone (MYCOLOG II) cream Apply topically daily as needed       ascorbic acid (VITAMIN C) 1000 MG TABS Take 1,000 mg by mouth daily       ondansetron (ZOFRAN) 4 MG tablet Take 1 tablet by mouth every 6 hours as needed Reported on 3/20/2017       aspirin (SB LOW DOSE ASA EC) 81 MG EC tablet Take 81 mg by mouth daily       nystatin (MYCOSTATIN) cream Apply topically daily as needed        levothyroxine (SYNTHROID) 112 MCG tablet Take 112 mcg by mouth daily Take 112 mcg daily except for Friday takes an additional 56 mcg.  Brand name Synthroid       diphenhydrAMINE-acetaminophen (TYLENOL PM)  MG tablet Take 1 tablet by mouth At Bedtime Reported on 3/20/2017       fexofenadine (ALLEGRA) 180 MG tablet Take 180 mg by mouth daily. 120 0     MULTIVITAMINS OR TABS ONE DAILY 100 3       ALLERGIES     Allergies   Allergen Reactions     Nsaids Difficulty breathing     Increased creatinine     Toradol Difficulty breathing     Shortness of breath     Celecoxib Itching and Rash     Codeine  Itching     With higher doses     No Clinical Screening - See Comments Itching     Fragrance     Vioxx Other (See Comments)     Heart races     Conjugated Estrogens Rash     Sulfa Drugs Rash       PAST MEDICAL HISTORY:  Past Medical History:   Diagnosis Date     Abdominal adhesions 1984, 96,99    s/p lysis     Allergic rhinitis, cause unspecified      Carotid stenosis      CPAP (continuous positive airway pressure) dependence      Diabetic gastroparesis (H)      Diet-controlled type 2 diabetes mellitus (H)      DVT of axillary vein, acute right (H)      Fibromyalgia      Gastro-oesophageal reflux disease      Hernia of unspecified site of abdominal cavity without mention of obstruction or gangrene     bilateral     HTN (hypertension)      Hypertriglyceridemia      Obstructive sleep apnea      ANNETTE (obstructive sleep apnea)      Papillary carcinoma of thyroid (H)     s/p thyroidectomy - Ruegemer     PE (pulmonary embolism)      Poliomyelitis     poor circulation right leg     Postsurgical hypothyroidism     s/p papillary thryoid cancer - Ruegemer     Pulmonary embolism (H)      Rosacea      S/P carpal tunnel release     bilateral     S/P hardware removal 01/2014    still with lingering foot pain     S/P shoulder surgery     bilateral     Septic joint (H)     right knee     Venous insufficiency      Venous thrombosis 1999    right axillary vein       PAST SURGICAL HISTORY:  Past Surgical History:   Procedure Laterality Date     AMPUTATE TOE(S)  3/15/2012    Procedure:AMPUTATE TOE(S); Surgeon:RUBEN MOSES; Location: OR     APPENDECTOMY  1972     ARTHRODESIS FOOT  3/15/2012    Procedure:ARTHRODESIS FOOT; RIGHT TRIPLE ARTHRODESIS, FIFTH TOE AMPUTATION, LATERAL LIGAMENT RECONSTRUCTION, TENDON TRANSFER AND RELEASE [MINI C-ARM, ARTHREX 4.5 AND 6.7 STAPLES, BIOCOMPOSITE TENODESIS SCREWS]; Surgeon:RUBEN MOSES; Location: OR     C FREEING BOWEL ADHESION,ENTEROLYSIS      1986, 1996, 1999     C  NONSPECIFIC PROCEDURE      throidectomy     C TOTAL ABDOM HYSTERECTOMY  1980    + BSO     CHOLECYSTECTOMY       COLOSTOMY  2/7/2012    Procedure:COLOSTOMY; CREATION OF SIGMOID COLOSTOMY AND EXTENSIVE  LYSIS OF ADHESIONS; Surgeon:MONTSERRAT BENDER; Location:[facility] OR     GI SURGERY      weakened rectal sphincter with artificial stimulator     LAPAROTOMY, LYSIS ADHESIONS, COMBINED  2/7/2012    Procedure:COMBINED LAPAROTOMY, LYSIS ADHESIONS; Surgeon:MONTSERRAT BENDER; Location:[facility] OR     RELEASE TENDON FOOT  3/15/2012    Procedure:RELEASE TENDON FOOT; Surgeon:SUKHDEEP METZ; Location:[facility] OR     REMOVE HARDWARE FOOT  12/13/2012    Procedure: REMOVE HARDWARE FOOT;  RIGHT FOOT REMOVAL OF HARDWARE;  Surgeon: Sukhdeep Metz MD;  Location: [facility] OR       FAMILY HISTORY:  Family History   Problem Relation Age of Onset     Arthritis Mother      Hypertension Mother      CEREBROVASCULAR DISEASE Mother      Obesity Mother      HEART DISEASE Mother      MI's     Hypertension Father      Respiratory Father      Adult RDS     DIABETES Father      adult     Arthritis Sister      CANCER Sister      Arthritis Sister      Hypertension Sister      CANCER Sister      colon polup     HEART DISEASE Brother      MI at 54     Hypertension Sister      Lipids Brother      Hypertension Brother      Lipids Sister      Obesity Sister      Obesity Maternal Grandmother      Skin Cancer Maternal Grandmother      skin cancer unknown     CANCER Maternal Grandmother      unknown skin cancer on face       SOCIAL HISTORY:  Social History     Social History     Marital status:      Spouse name: N/A     Number of children: N/A     Years of education: N/A     Social History Main Topics     Smoking status: Never Smoker     Smokeless tobacco: Never Used     Alcohol use No      Comment: very rare     Drug use: No     Sexual activity: Not Currently     Partners: Male     Birth control/ protection: Female Surgical     Other Topics Concern      "Caffeine Concern Yes     occ     Sleep Concern Yes     Stress Concern Yes     Special Diet Yes     low carb, low sugar      Exercise No     occ. 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[employer]        Review of Systems:  Skin:  Negative     Eyes:  Positive for glasses  ENT:  Negative    Respiratory:  Positive for    Cardiovascular:    Positive for;chest pain;palpitations  Gastroenterology: Positive for reflux;nausea;abdominal pain  Genitourinary:  Negative    Musculoskeletal:  Positive for joint pain;arthritis;back pain  Neurologic:  Positive for stroke;headaches;migraine headaches  Psychiatric:  Negative sleep disturbances  Heme/Lymph/Imm:  Positive for allergies  Endocrine:  Positive for thyroid disorder;diabetes    Physical Exam:  Vitals: /76  Pulse 76  Ht 1.562 m (5' 1.5\")  Wt 93 kg (205 lb)  BMI 38.11 kg/m2    Constitutional:  cooperative obese      Skin:  warm and dry to the touch          Head:  normocephalic        Eyes:  pupils equal and round        Lymph:      ENT:  no pallor or cyanosis        Neck:  JVP normal;no stiffness        Respiratory:  normal breath sounds, clear to auscultation, normal A-P diameter, normal symmetry, normal respiratory excursion, no use of accessory muscles         Cardiac: regular rhythm;normal S1 and S2   distant heart sounds                right femoral artery;0 right radial artery;3+ right carotid artery;2+   right popliteal artery;2+     left femoral artery;3+ left radial artery;3+ left carotid artery;2+         right femoral bruit (-)   right leg cool \"since polio\"    GI:  abdomen soft obese      Extremities and Muscular Skeletal:      bilateral LE edema;trace     brace on right ankle    Neurological:    limb weakness;foot drop right LLE    Psych:  Alert and Oriented x 3        Recent Lab Results:  LIPID RESULTS:  Lab Results   Component Value Date    CHOL 202 (H) " 08/01/2017    HDL 35 (L) 08/01/2017    LDL 49 02/15/2017    TRIG 451 (H) 08/01/2017    CHOLHDLRATIO 4.6 02/15/2017    CHOLHDLRATIO 4.6 03/31/2015       LIVER ENZYME RESULTS:  Lab Results   Component Value Date    AST 28 02/12/2018    ALT 29 02/12/2018       CBC RESULTS:  Lab Results   Component Value Date    WBC 7.4 02/12/2018    RBC 3.66 (L) 02/12/2018    HGB 11.0 (L) 02/12/2018    HCT 33.0 (L) 02/12/2018    MCV 90 02/12/2018    MCH 30.1 02/12/2018    MCHC 33.3 02/12/2018    RDW 12.6 02/12/2018     02/12/2018       BMP RESULTS:  Lab Results   Component Value Date     02/12/2018    POTASSIUM 4.3 02/12/2018    CHLORIDE 104 02/12/2018    CO2 29 02/12/2018    ANIONGAP 6 02/12/2018     (H) 02/12/2018    BUN 35 (H) 02/12/2018    CR 1.33 (H) 02/12/2018    GFRESTIMATED 40 (L) 02/16/2018    GFRESTBLACK 49 (L) 02/16/2018    RINKU 9.4 02/12/2018        A1C RESULTS:  Lab Results   Component Value Date    A1C 6.5 (H) 02/12/2018       INR RESULTS:  Lab Results   Component Value Date    INR 2.00 (H) 11/18/2013    INR 2.30 (H) 10/23/2013           CC  Shola Benoit MD  [address]  [place] [address]                  Thank you for allowing me to participate in the care of your patient.      Sincerely,     MAGALY Bradford [facility]    cc:   Shola Benoit MD  [address]  [place] [address]

## 2018-02-22 ENCOUNTER — TELEPHONE (OUTPATIENT)
Dept: CARDIOLOGY | Facility: CLINIC | Age: 73
End: 2018-02-22

## 2018-02-22 ENCOUNTER — MEDICAL CORRESPONDENCE (OUTPATIENT)
Dept: HEALTH INFORMATION MANAGEMENT | Facility: CLINIC | Age: 73
End: 2018-02-22

## 2018-02-22 ENCOUNTER — TELEPHONE (OUTPATIENT)
Dept: FAMILY MEDICINE | Facility: CLINIC | Age: 73
End: 2018-02-22

## 2018-02-22 RX ORDER — ASPIRIN 81 MG/1
81 TABLET ORAL DAILY
Status: CANCELLED | OUTPATIENT
Start: 2018-02-22

## 2018-02-22 RX ORDER — LIDOCAINE 40 MG/G
CREAM TOPICAL
Status: CANCELLED | OUTPATIENT
Start: 2018-02-22

## 2018-02-22 RX ORDER — SODIUM CHLORIDE 9 MG/ML
INJECTION, SOLUTION INTRAVENOUS CONTINUOUS
Status: CANCELLED | OUTPATIENT
Start: 2018-02-22

## 2018-02-22 RX ORDER — POTASSIUM CHLORIDE 1500 MG/1
20 TABLET, EXTENDED RELEASE ORAL
Status: CANCELLED | OUTPATIENT
Start: 2018-02-22

## 2018-02-22 NOTE — PROGRESS NOTES
Service Date: 02/21/2018      HISTORY OF PRESENT ILLNESS:  Chasity Hodgson is a 72-year-old woman who is here today in followup to review recommendations by her primary cardiologist, Dr. Andreea Ortiz, for coronary angiogram.  Mrs. Hodgson has had a history of atypical chest pain.  Dr. Ortiz has followed along with her with this.  Her pain can be fairly predictable.  She notices it the most driving it.  She describes it as a discomfort that is not necessarily pressure, stabbing or burning, it usually comes and goes quickly, more recently has been located under her left breast.  It was not associated with diaphoreses or shortness of breath.  Higher caffeine intake will cause the pain to come on more quickly or come on more often.  The patient recently had a nuclear stress test that demonstrated a small to moderate area of apical anterior/anterolateral/ lateral ischemia extending through the apical segments.  There was a second small to moderate area of ischemia at the anterior/anterolateral base.  Gated images demonstrated normal wall motion and thickening.  The left ventricular systolic function is 70% at rest and 69% post stress.  This study was compared to one done in 04/2014, and that study demonstrated normal perfusion with normal ejection fraction.  The EKG findings demonstrated sinus rhythm.  The stress EKG demonstrated no ST-T changes diagnostic of ischemia or injury.  The patient would like her right shoulder replaced.  This has been scheduled with Dr. Ferrera, but he is requesting a coronary angiogram prior to her shoulder surgery.      PAST MEDICAL HISTORY:   1.  Chronic kidney disease with her last creatinine measured at 1.3.   2.  Hypertension with an elevated blood pressure today (patient is visibly upset, previous blood pressure 2 days ago demonstrates a blood pressure of 122/70)   3.  The patient also has peripheral artery disease based on evaluation done by Dr. Lazo in 2016.   4.  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  Patient with a right foot brace due to deformity.   5.  Diabetes mellitus type 2.   6.  Hyperlipidemia.   7.  History of hypothyroidism.   8.  History of DVT.   9.  Colostomy in place.      In the past, she has worn a Holter monitor which previously demonstrated a normal sinus rhythm with average heart rate variability with isolated PVCs.      Today, the patient states that since I saw her 2 days ago in the office, she has not had chest pain.      Patient lives with her .  She currently works as a  at [employer].      PHYSICAL EXAMINATION:   GENERAL:  The patient is alert and oriented.  Skin warm and dry.  She is emotionally upset due to the changes of her right shoulder replacement surgery.   VITAL SIGNS:  Blood pressure 176/76, pulse 76, weight today is 205.   CARDIAC:  Heart tones are distant but regular with an S1, S2 without an S3, S4.   LUNGS:  Clear without crackles or wheezes.   ABDOMEN:  Obese.   EXTREMITIES:  Left femoral pulse palpable, unable to palpate right femoral pulse.  Right leg is cooler than left leg (patient states chronic due to polio).  Patient with positive right carotid bruit, without left carotid bruit.  Bilateral radial pulses 2 to 3+.      Previous lab work done in the past demonstrates bilateral ultrasound of the lower extremities done in 2016 that demonstrate edema without significant peripheral artery disease.      IMPRESSION AND PLAN:   1.  Patient is a 72-year-old patient with a history of atypical chest pain with more recent recurrence who underwent a recent nuclear stress test that showed 2 areas of small to moderate ischemia.  This was compared to previous nuclear stress test done in 2014 which showed normal perfusion.  The patient would like to undergo right shoulder replacement.  A coronary angiogram has been recommended to further work up her abnormal stress test.  Today, I reviewed the risks and the benefits of the  procedure with Sammie, risks to include infection, hematoma formation, internal bleeding, dye nephrotoxicity, stroke and death.  I also talked about stenting and dual antiplatelet therapy.  The patient just scheduled for this procedure on Friday.  Her  will be the responsible adult to drive her and bring her back home.  We will set the patient up in followup to review the results of the test and to recommend cardiac rehab.    2.  Patient with a history of hypertension with recent medication changes.  While her blood pressure is elevated today, she was visibly upset with the change in her surgical schedule.  Previously, her blood pressure was well controlled.   3.  Chronic kidney disease.  She will be hydrated before her angiogram.   4.  Hyperlipidemia.  Her LDL is at goal.   5.  Carotid stenosis.  She may need another ultrasound.  We will review this at her followup appointment.      It has been my pleasure to see Sammie again today in clinic.      Imani Louie MS, ANP         MAGALY SINGH, CNP             D: 2018   T: 2018   MT: MARY LOU      Name:     SUZANNE BROOKE   MRN:      [id number]        Account:      [id number]   :      1945           Service Date: 2018      Document: [id number]

## 2018-02-22 NOTE — TELEPHONE ENCOUNTER
Contacted patient to review pre-cath procedures: patient is scheduled for coronary angiogram (left)  on 2/23/18 . Patient's arrival time is 0630 and procedure time is 1000. Patient is aware to be NPO except for medications. Patient will hold the medication metformin and HCTZ . Patient has arranged for transportation and 24 hour f/u care. Patient has no known contract dye allergy. Patient is diabetic and will call her PCP to discuss a plan for her insulin while NPO. Patient is not taking anti-coagulation medication. Patient's renal function shows a creatinine of 1.3, extra 2 hours of fluids are ordered pre-procedure. Patient will continue daily aspirin dose 81. Order for procedure entered.    Patient states she did not start the nitro-patch yet as it was not available at the pharmacy right away. She will ask about starting after the angiogram.  Patient notes that she has tried crestor in the past with a reaction of muscles aches. That medication was discontinued and she retried lipitor for the last month. Patient states she has had a reaction of muscle aches with lipitor before and is hoping to find an alternative after her angiogram results.    Patient requested a copy of the phone call discussing the postponement of her shoulder surgery so she can set up the replacement date with her surgeon. She also requested copies of her last two office visit notes to review for her surgery. Records to  for  today.

## 2018-02-22 NOTE — TELEPHONE ENCOUNTER
Reason for Call:  Other prescription    Detailed comments: Patient is having an angiogram on 2/23/18 and was told for her now cancelled shoulder surgery to only take half a dose of insulin before that. She wants to know what her dosage of insulin should be for tomorrow, prior to the angiogram in which she will be fasting after midnight.     Patient is going to work shortly, pt  will be home can leave message with .     Phone Number Patient can be reached at: Home number on file [phone] (home)    Best Time: anytime     Can we leave a detailed message on this number? YES    Call taken on 2/22/2018 at 9:26 AM by Kayla Infante

## 2018-02-22 NOTE — TELEPHONE ENCOUNTER
Stop  Metformin and hold for 48 hours after angiogram    Take 1/2 dose of insulin in the morning    Shola Benoit MD

## 2018-02-23 ENCOUNTER — HOSPITAL ENCOUNTER (OUTPATIENT)
Facility: CLINIC | Age: 73
Discharge: HOME | End: 2018-02-23
Attending: INTERNAL MEDICINE | Admitting: INTERNAL MEDICINE
Payer: MEDICARE

## 2018-02-23 ENCOUNTER — DOCUMENTATION ONLY (OUTPATIENT)
Dept: CARDIOLOGY | Facility: CLINIC | Age: 73
End: 2018-02-23

## 2018-02-23 ENCOUNTER — APPOINTMENT (OUTPATIENT)
Dept: CARDIOLOGY | Facility: CLINIC | Age: 73
End: 2018-02-23
Attending: INTERNAL MEDICINE
Payer: MEDICARE

## 2018-02-23 VITALS
SYSTOLIC BLOOD PRESSURE: 152 MMHG | TEMPERATURE: 97.7 F | RESPIRATION RATE: 14 BRPM | WEIGHT: 204.31 LBS | BODY MASS INDEX: 40.11 KG/M2 | DIASTOLIC BLOOD PRESSURE: 61 MMHG | OXYGEN SATURATION: 96 % | HEIGHT: 60 IN | HEART RATE: 55 BPM

## 2018-02-23 DIAGNOSIS — I25.10 CORONARY ARTERY DISEASE INVOLVING NATIVE CORONARY ARTERY OF NATIVE HEART WITHOUT ANGINA PECTORIS: ICD-10-CM

## 2018-02-23 LAB
ANION GAP SERPL CALCULATED.3IONS-SCNC: 5 MMOL/L (ref 3–14)
APTT PPP: 27 SEC (ref 22–37)
BUN SERPL-MCNC: 35 MG/DL (ref 7–30)
CALCIUM SERPL-MCNC: 9.1 MG/DL (ref 8.5–10.1)
CHLORIDE SERPL-SCNC: 98 MMOL/L (ref 94–109)
CO2 SERPL-SCNC: 31 MMOL/L (ref 20–32)
CREAT SERPL-MCNC: 1.29 MG/DL (ref 0.52–1.04)
ERYTHROCYTE [DISTWIDTH] IN BLOOD BY AUTOMATED COUNT: 12.4 % (ref 10–15)
GFR SERPL CREATININE-BSD FRML MDRD: 41 ML/MIN/1.7M2
GLUCOSE BLDC GLUCOMTR-MCNC: 129 MG/DL (ref 70–99)
GLUCOSE BLDC GLUCOMTR-MCNC: 229 MG/DL (ref 70–99)
GLUCOSE SERPL-MCNC: 129 MG/DL (ref 70–99)
HCT VFR BLD AUTO: 32.7 % (ref 35–47)
HGB BLD-MCNC: 11.2 G/DL (ref 11.7–15.7)
INR PPP: 0.98 (ref 0.86–1.14)
MCH RBC QN AUTO: 30.2 PG (ref 26.5–33)
MCHC RBC AUTO-ENTMCNC: 34.3 G/DL (ref 31.5–36.5)
MCV RBC AUTO: 88 FL (ref 78–100)
PLATELET # BLD AUTO: 233 10E9/L (ref 150–450)
POTASSIUM SERPL-SCNC: 4 MMOL/L (ref 3.4–5.3)
RBC # BLD AUTO: 3.71 10E12/L (ref 3.8–5.2)
SODIUM SERPL-SCNC: 134 MMOL/L (ref 133–144)
WBC # BLD AUTO: 8 10E9/L (ref 4–11)

## 2018-02-23 PROCEDURE — 85610 PROTHROMBIN TIME: CPT | Performed by: INTERNAL MEDICINE

## 2018-02-23 PROCEDURE — 99152 MOD SED SAME PHYS/QHP 5/>YRS: CPT

## 2018-02-23 PROCEDURE — 40000852 ZZH STATISTIC HEART CATH LAB OR EP LAB

## 2018-02-23 PROCEDURE — 25000125 ZZHC RX 250: Performed by: INTERNAL MEDICINE

## 2018-02-23 PROCEDURE — 85027 COMPLETE CBC AUTOMATED: CPT | Performed by: INTERNAL MEDICINE

## 2018-02-23 PROCEDURE — 27211089 ZZH KIT ACIST INJECTOR CR3

## 2018-02-23 PROCEDURE — 40000235 ZZH STATISTIC TELEMETRY

## 2018-02-23 PROCEDURE — 85730 THROMBOPLASTIN TIME PARTIAL: CPT | Performed by: INTERNAL MEDICINE

## 2018-02-23 PROCEDURE — C1769 GUIDE WIRE: HCPCS

## 2018-02-23 PROCEDURE — 82962 GLUCOSE BLOOD TEST: CPT | Mod: 91

## 2018-02-23 PROCEDURE — 27210946 ZZH KIT HC TOTES DISP CR8

## 2018-02-23 PROCEDURE — 93005 ELECTROCARDIOGRAM TRACING: CPT

## 2018-02-23 PROCEDURE — 27210787 ZZH MANIFOLD CR2

## 2018-02-23 PROCEDURE — 93458 L HRT ARTERY/VENTRICLE ANGIO: CPT

## 2018-02-23 PROCEDURE — A9270 NON-COVERED ITEM OR SERVICE: HCPCS | Mod: GY | Performed by: INTERNAL MEDICINE

## 2018-02-23 PROCEDURE — 27210910 ZZH NEEDLE CR6

## 2018-02-23 PROCEDURE — 99152 MOD SED SAME PHYS/QHP 5/>YRS: CPT | Performed by: INTERNAL MEDICINE

## 2018-02-23 PROCEDURE — 40000065 ZZH STATISTIC EKG NON-CHARGEABLE

## 2018-02-23 PROCEDURE — 93010 ELECTROCARDIOGRAM REPORT: CPT | Performed by: INTERNAL MEDICINE

## 2018-02-23 PROCEDURE — 25000128 H RX IP 250 OP 636: Performed by: INTERNAL MEDICINE

## 2018-02-23 PROCEDURE — 93458 L HRT ARTERY/VENTRICLE ANGIO: CPT | Mod: 26 | Performed by: INTERNAL MEDICINE

## 2018-02-23 PROCEDURE — 25000132 ZZH RX MED GY IP 250 OP 250 PS 637: Mod: GY | Performed by: INTERNAL MEDICINE

## 2018-02-23 PROCEDURE — 99153 MOD SED SAME PHYS/QHP EA: CPT

## 2018-02-23 PROCEDURE — 80048 BASIC METABOLIC PNL TOTAL CA: CPT | Performed by: INTERNAL MEDICINE

## 2018-02-23 PROCEDURE — 27210795 ZZH PAD DEFIB QUICK CR4

## 2018-02-23 RX ORDER — PROMETHAZINE HYDROCHLORIDE 25 MG/ML
6.25-25 INJECTION, SOLUTION INTRAMUSCULAR; INTRAVENOUS EVERY 4 HOURS PRN
Status: DISCONTINUED | OUTPATIENT
Start: 2018-02-23 | End: 2018-02-23 | Stop reason: HOSPADM

## 2018-02-23 RX ORDER — DOPAMINE HYDROCHLORIDE 160 MG/100ML
2-20 INJECTION, SOLUTION INTRAVENOUS CONTINUOUS PRN
Status: DISCONTINUED | OUTPATIENT
Start: 2018-02-23 | End: 2018-02-23 | Stop reason: HOSPADM

## 2018-02-23 RX ORDER — NALOXONE HYDROCHLORIDE 0.4 MG/ML
0.4 INJECTION, SOLUTION INTRAMUSCULAR; INTRAVENOUS; SUBCUTANEOUS EVERY 5 MIN PRN
Status: DISCONTINUED | OUTPATIENT
Start: 2018-02-23 | End: 2018-02-23 | Stop reason: HOSPADM

## 2018-02-23 RX ORDER — NIFEDIPINE 10 MG/1
10 CAPSULE ORAL
Status: DISCONTINUED | OUTPATIENT
Start: 2018-02-23 | End: 2018-02-23 | Stop reason: HOSPADM

## 2018-02-23 RX ORDER — PRASUGREL 10 MG/1
10-60 TABLET, FILM COATED ORAL
Status: DISCONTINUED | OUTPATIENT
Start: 2018-02-23 | End: 2018-02-23 | Stop reason: HOSPADM

## 2018-02-23 RX ORDER — METOPROLOL TARTRATE 1 MG/ML
5 INJECTION, SOLUTION INTRAVENOUS EVERY 5 MIN PRN
Status: DISCONTINUED | OUTPATIENT
Start: 2018-02-23 | End: 2018-02-23 | Stop reason: HOSPADM

## 2018-02-23 RX ORDER — ADENOSINE 3 MG/ML
12-12000 INJECTION, SOLUTION INTRAVENOUS
Status: DISCONTINUED | OUTPATIENT
Start: 2018-02-23 | End: 2018-02-23 | Stop reason: HOSPADM

## 2018-02-23 RX ORDER — PROTAMINE SULFATE 10 MG/ML
1-5 INJECTION, SOLUTION INTRAVENOUS
Status: DISCONTINUED | OUTPATIENT
Start: 2018-02-23 | End: 2018-02-23 | Stop reason: HOSPADM

## 2018-02-23 RX ORDER — VERAPAMIL HYDROCHLORIDE 2.5 MG/ML
1-2.5 INJECTION, SOLUTION INTRAVENOUS
Status: DISCONTINUED | OUTPATIENT
Start: 2018-02-23 | End: 2018-02-23 | Stop reason: HOSPADM

## 2018-02-23 RX ORDER — PROTAMINE SULFATE 10 MG/ML
25-100 INJECTION, SOLUTION INTRAVENOUS EVERY 5 MIN PRN
Status: DISCONTINUED | OUTPATIENT
Start: 2018-02-23 | End: 2018-02-23 | Stop reason: HOSPADM

## 2018-02-23 RX ORDER — ASPIRIN 81 MG/1
81-324 TABLET, CHEWABLE ORAL
Status: DISCONTINUED | OUTPATIENT
Start: 2018-02-23 | End: 2018-02-23 | Stop reason: HOSPADM

## 2018-02-23 RX ORDER — METHYLPREDNISOLONE SODIUM SUCCINATE 125 MG/2ML
125 INJECTION, POWDER, LYOPHILIZED, FOR SOLUTION INTRAMUSCULAR; INTRAVENOUS
Status: DISCONTINUED | OUTPATIENT
Start: 2018-02-23 | End: 2018-02-23 | Stop reason: HOSPADM

## 2018-02-23 RX ORDER — LIDOCAINE 40 MG/G
CREAM TOPICAL
Status: DISCONTINUED | OUTPATIENT
Start: 2018-02-23 | End: 2018-02-23 | Stop reason: HOSPADM

## 2018-02-23 RX ORDER — NICARDIPINE HYDROCHLORIDE 2.5 MG/ML
100 INJECTION INTRAVENOUS
Status: DISCONTINUED | OUTPATIENT
Start: 2018-02-23 | End: 2018-02-23 | Stop reason: HOSPADM

## 2018-02-23 RX ORDER — FENTANYL CITRATE 50 UG/ML
25-50 INJECTION, SOLUTION INTRAMUSCULAR; INTRAVENOUS
Status: DISCONTINUED | OUTPATIENT
Start: 2018-02-23 | End: 2018-02-23 | Stop reason: HOSPADM

## 2018-02-23 RX ORDER — NITROGLYCERIN 5 MG/ML
100-500 VIAL (ML) INTRAVENOUS
Status: DISCONTINUED | OUTPATIENT
Start: 2018-02-23 | End: 2018-02-23 | Stop reason: HOSPADM

## 2018-02-23 RX ORDER — DEXTROSE MONOHYDRATE 25 G/50ML
12.5-5 INJECTION, SOLUTION INTRAVENOUS EVERY 30 MIN PRN
Status: DISCONTINUED | OUTPATIENT
Start: 2018-02-23 | End: 2018-02-23 | Stop reason: HOSPADM

## 2018-02-23 RX ORDER — NITROGLYCERIN 20 MG/100ML
.07-2 INJECTION INTRAVENOUS CONTINUOUS PRN
Status: DISCONTINUED | OUTPATIENT
Start: 2018-02-23 | End: 2018-02-23 | Stop reason: HOSPADM

## 2018-02-23 RX ORDER — FENTANYL CITRATE 50 UG/ML
25 INJECTION, SOLUTION INTRAMUSCULAR; INTRAVENOUS ONCE
Status: COMPLETED | OUTPATIENT
Start: 2018-02-23 | End: 2018-02-23

## 2018-02-23 RX ORDER — HEPARIN SODIUM 1000 [USP'U]/ML
1000-10000 INJECTION, SOLUTION INTRAVENOUS; SUBCUTANEOUS EVERY 5 MIN PRN
Status: DISCONTINUED | OUTPATIENT
Start: 2018-02-23 | End: 2018-02-23 | Stop reason: HOSPADM

## 2018-02-23 RX ORDER — CLOPIDOGREL 300 MG/1
300-600 TABLET, FILM COATED ORAL
Status: DISCONTINUED | OUTPATIENT
Start: 2018-02-23 | End: 2018-02-23 | Stop reason: HOSPADM

## 2018-02-23 RX ORDER — ACETAMINOPHEN 325 MG/1
325-650 TABLET ORAL EVERY 4 HOURS PRN
Status: DISCONTINUED | OUTPATIENT
Start: 2018-02-23 | End: 2018-02-23 | Stop reason: HOSPADM

## 2018-02-23 RX ORDER — DOBUTAMINE HYDROCHLORIDE 200 MG/100ML
2-20 INJECTION INTRAVENOUS CONTINUOUS PRN
Status: DISCONTINUED | OUTPATIENT
Start: 2018-02-23 | End: 2018-02-23 | Stop reason: HOSPADM

## 2018-02-23 RX ORDER — POTASSIUM CHLORIDE 7.45 MG/ML
10 INJECTION INTRAVENOUS
Status: DISCONTINUED | OUTPATIENT
Start: 2018-02-23 | End: 2018-02-23 | Stop reason: HOSPADM

## 2018-02-23 RX ORDER — FLUMAZENIL 0.1 MG/ML
0.2 INJECTION, SOLUTION INTRAVENOUS
Status: DISCONTINUED | OUTPATIENT
Start: 2018-02-23 | End: 2018-02-23 | Stop reason: HOSPADM

## 2018-02-23 RX ORDER — ASPIRIN 81 MG/1
81 TABLET ORAL DAILY
Status: DISCONTINUED | OUTPATIENT
Start: 2018-02-23 | End: 2018-02-23 | Stop reason: HOSPADM

## 2018-02-23 RX ORDER — SODIUM CHLORIDE 9 MG/ML
INJECTION, SOLUTION INTRAVENOUS CONTINUOUS
Status: DISCONTINUED | OUTPATIENT
Start: 2018-02-23 | End: 2018-02-23 | Stop reason: HOSPADM

## 2018-02-23 RX ORDER — HYDROCODONE BITARTRATE AND ACETAMINOPHEN 5; 325 MG/1; MG/1
1-2 TABLET ORAL EVERY 4 HOURS PRN
Status: DISCONTINUED | OUTPATIENT
Start: 2018-02-23 | End: 2018-02-23 | Stop reason: HOSPADM

## 2018-02-23 RX ORDER — FUROSEMIDE 10 MG/ML
20-100 INJECTION INTRAMUSCULAR; INTRAVENOUS
Status: DISCONTINUED | OUTPATIENT
Start: 2018-02-23 | End: 2018-02-23 | Stop reason: HOSPADM

## 2018-02-23 RX ORDER — DIPHENHYDRAMINE HYDROCHLORIDE 50 MG/ML
25-50 INJECTION INTRAMUSCULAR; INTRAVENOUS
Status: DISCONTINUED | OUTPATIENT
Start: 2018-02-23 | End: 2018-02-23 | Stop reason: HOSPADM

## 2018-02-23 RX ORDER — NALOXONE HYDROCHLORIDE 0.4 MG/ML
.1-.4 INJECTION, SOLUTION INTRAMUSCULAR; INTRAVENOUS; SUBCUTANEOUS
Status: DISCONTINUED | OUTPATIENT
Start: 2018-02-23 | End: 2018-02-23 | Stop reason: HOSPADM

## 2018-02-23 RX ORDER — NITROGLYCERIN 5 MG/ML
100-200 VIAL (ML) INTRAVENOUS
Status: DISCONTINUED | OUTPATIENT
Start: 2018-02-23 | End: 2018-02-23 | Stop reason: HOSPADM

## 2018-02-23 RX ORDER — CLOPIDOGREL BISULFATE 75 MG/1
75 TABLET ORAL
Status: DISCONTINUED | OUTPATIENT
Start: 2018-02-23 | End: 2018-02-23 | Stop reason: HOSPADM

## 2018-02-23 RX ORDER — ENALAPRILAT 1.25 MG/ML
1.25-2.5 INJECTION INTRAVENOUS
Status: DISCONTINUED | OUTPATIENT
Start: 2018-02-23 | End: 2018-02-23 | Stop reason: HOSPADM

## 2018-02-23 RX ORDER — HYDRALAZINE HYDROCHLORIDE 20 MG/ML
10-20 INJECTION INTRAMUSCULAR; INTRAVENOUS
Status: DISCONTINUED | OUTPATIENT
Start: 2018-02-23 | End: 2018-02-23 | Stop reason: HOSPADM

## 2018-02-23 RX ORDER — PHENYLEPHRINE HCL IN 0.9% NACL 1 MG/10 ML
20-100 SYRINGE (ML) INTRAVENOUS
Status: DISCONTINUED | OUTPATIENT
Start: 2018-02-23 | End: 2018-02-23 | Stop reason: HOSPADM

## 2018-02-23 RX ORDER — BUPIVACAINE HYDROCHLORIDE 2.5 MG/ML
1-10 INJECTION, SOLUTION EPIDURAL; INFILTRATION; INTRACAUDAL
Status: DISCONTINUED | OUTPATIENT
Start: 2018-02-23 | End: 2018-02-23 | Stop reason: HOSPADM

## 2018-02-23 RX ORDER — POTASSIUM CHLORIDE 29.8 MG/ML
20 INJECTION INTRAVENOUS
Status: DISCONTINUED | OUTPATIENT
Start: 2018-02-23 | End: 2018-02-23 | Stop reason: HOSPADM

## 2018-02-23 RX ORDER — LORAZEPAM 2 MG/ML
.5-2 INJECTION INTRAMUSCULAR EVERY 4 HOURS PRN
Status: DISCONTINUED | OUTPATIENT
Start: 2018-02-23 | End: 2018-02-23 | Stop reason: HOSPADM

## 2018-02-23 RX ORDER — IOPAMIDOL 755 MG/ML
40 INJECTION, SOLUTION INTRAVASCULAR ONCE
Status: COMPLETED | OUTPATIENT
Start: 2018-02-23 | End: 2018-02-23

## 2018-02-23 RX ORDER — NALOXONE HYDROCHLORIDE 0.4 MG/ML
.2-.4 INJECTION, SOLUTION INTRAMUSCULAR; INTRAVENOUS; SUBCUTANEOUS
Status: DISCONTINUED | OUTPATIENT
Start: 2018-02-23 | End: 2018-02-23 | Stop reason: HOSPADM

## 2018-02-23 RX ORDER — ATROPINE SULFATE 0.1 MG/ML
0.5 INJECTION INTRAVENOUS EVERY 5 MIN PRN
Status: DISCONTINUED | OUTPATIENT
Start: 2018-02-23 | End: 2018-02-23 | Stop reason: HOSPADM

## 2018-02-23 RX ORDER — SODIUM NITROPRUSSIDE 25 MG/ML
100-200 INJECTION INTRAVENOUS
Status: DISCONTINUED | OUTPATIENT
Start: 2018-02-23 | End: 2018-02-23 | Stop reason: HOSPADM

## 2018-02-23 RX ORDER — ATROPINE SULFATE 0.1 MG/ML
.5-1 INJECTION INTRAVENOUS
Status: DISCONTINUED | OUTPATIENT
Start: 2018-02-23 | End: 2018-02-23 | Stop reason: HOSPADM

## 2018-02-23 RX ORDER — MORPHINE SULFATE 2 MG/ML
1-2 INJECTION, SOLUTION INTRAMUSCULAR; INTRAVENOUS EVERY 5 MIN PRN
Status: DISCONTINUED | OUTPATIENT
Start: 2018-02-23 | End: 2018-02-23 | Stop reason: HOSPADM

## 2018-02-23 RX ORDER — ONDANSETRON 2 MG/ML
4 INJECTION INTRAMUSCULAR; INTRAVENOUS EVERY 4 HOURS PRN
Status: DISCONTINUED | OUTPATIENT
Start: 2018-02-23 | End: 2018-02-23 | Stop reason: HOSPADM

## 2018-02-23 RX ORDER — ASPIRIN 325 MG
325 TABLET ORAL
Status: DISCONTINUED | OUTPATIENT
Start: 2018-02-23 | End: 2018-02-23 | Stop reason: HOSPADM

## 2018-02-23 RX ORDER — EPINEPHRINE 1 MG/ML
0.3 INJECTION, SOLUTION, CONCENTRATE INTRAVENOUS
Status: DISCONTINUED | OUTPATIENT
Start: 2018-02-23 | End: 2018-02-23 | Stop reason: HOSPADM

## 2018-02-23 RX ORDER — LIDOCAINE HYDROCHLORIDE 10 MG/ML
1-10 INJECTION, SOLUTION EPIDURAL; INFILTRATION; INTRACAUDAL; PERINEURAL
Status: COMPLETED | OUTPATIENT
Start: 2018-02-23 | End: 2018-02-23

## 2018-02-23 RX ORDER — LIDOCAINE HYDROCHLORIDE 10 MG/ML
30 INJECTION, SOLUTION EPIDURAL; INFILTRATION; INTRACAUDAL; PERINEURAL
Status: DISCONTINUED | OUTPATIENT
Start: 2018-02-23 | End: 2018-02-23 | Stop reason: HOSPADM

## 2018-02-23 RX ORDER — POTASSIUM CHLORIDE 1500 MG/1
20 TABLET, EXTENDED RELEASE ORAL
Status: DISCONTINUED | OUTPATIENT
Start: 2018-02-23 | End: 2018-02-23 | Stop reason: HOSPADM

## 2018-02-23 RX ORDER — NITROGLYCERIN 0.4 MG/1
0.4 TABLET SUBLINGUAL EVERY 5 MIN PRN
Status: DISCONTINUED | OUTPATIENT
Start: 2018-02-23 | End: 2018-02-23 | Stop reason: HOSPADM

## 2018-02-23 RX ADMIN — MIDAZOLAM 0.5 MG: 1 INJECTION INTRAMUSCULAR; INTRAVENOUS at 10:03

## 2018-02-23 RX ADMIN — FENTANYL CITRATE 25 MCG: 50 INJECTION, SOLUTION INTRAMUSCULAR; INTRAVENOUS at 10:08

## 2018-02-23 RX ADMIN — FENTANYL CITRATE 25 MCG: 50 INJECTION, SOLUTION INTRAMUSCULAR; INTRAVENOUS at 12:20

## 2018-02-23 RX ADMIN — FENTANYL CITRATE 25 MCG: 50 INJECTION, SOLUTION INTRAMUSCULAR; INTRAVENOUS at 10:13

## 2018-02-23 RX ADMIN — LIDOCAINE HYDROCHLORIDE 100 MG: 10 INJECTION, SOLUTION EPIDURAL; INFILTRATION; INTRACAUDAL; PERINEURAL at 10:13

## 2018-02-23 RX ADMIN — MIDAZOLAM 1 MG: 1 INJECTION INTRAMUSCULAR; INTRAVENOUS at 10:41

## 2018-02-23 RX ADMIN — MIDAZOLAM 0.5 MG: 1 INJECTION INTRAMUSCULAR; INTRAVENOUS at 10:08

## 2018-02-23 RX ADMIN — SODIUM CHLORIDE: 9 INJECTION, SOLUTION INTRAVENOUS at 06:39

## 2018-02-23 RX ADMIN — IOPAMIDOL 40 ML: 755 INJECTION, SOLUTION INTRAVASCULAR at 10:30

## 2018-02-23 RX ADMIN — HYDROCODONE BITARTRATE AND ACETAMINOPHEN 2 TABLET: 5; 325 TABLET ORAL at 18:27

## 2018-02-23 RX ADMIN — FENTANYL CITRATE 50 MCG: 50 INJECTION, SOLUTION INTRAMUSCULAR; INTRAVENOUS at 10:41

## 2018-02-23 RX ADMIN — HYDROCODONE BITARTRATE AND ACETAMINOPHEN 1 TABLET: 5; 325 TABLET ORAL at 14:44

## 2018-02-23 NOTE — IP AVS SNAPSHOT
[facility] [address]    [place] [address]    Phone:  [phone]                                       After Visit Summary   2/23/2018    Chasity Hodgson    MRN: [id number]           After Visit Summary Signature Page     I have received my discharge instructions, and my questions have been answered. I have discussed any challenges I see with this plan with the nurse or doctor.    ..........................................................................................................................................  Patient/Patient Representative Signature      ..........................................................................................................................................  Patient Representative Print Name and Relationship to Patient    ..................................................               ................................................  Date                                            Time    ..........................................................................................................................................  Reviewed by Signature/Title    ...................................................              ..............................................  Date                                                            Time

## 2018-02-23 NOTE — PROGRESS NOTES
Pain improved since pain medication. Ice to hematoma helping. Taking fluids. Spoke with Dr Bhatia regarding femstop loosening - will keep full pressure until at 1400 hours and keep on bedrest until 1800 hours.

## 2018-02-23 NOTE — PROGRESS NOTES
Angiogram results 2/23/18 faxed to Dr. Kishore Ferrera, [facility] -[phone]  Noted: patient may procedure with plan for shoulder surgery

## 2018-02-23 NOTE — PROGRESS NOTES
Patient for angiogram following abnormal stress test. Creatinine remains elevated. Patient took partial dose of insulin this a.m. Contrast instructions reviewed. Procedure explained and patient and spouse voice understanding. Signature page for medical record transfer completed by patient. Hydration running. Await Dr Bhatia for consent.

## 2018-02-23 NOTE — PROGRESS NOTES
"1530- FEMSTOP BELT-  Does not have any pressure from the belt. She is eating lunch. She is not crying anymore. Her  is here. Groin site is stable without change.  1630- DCI reviewed with patient and  who verbalize understanding.  1730- Voided a moderate amount on the bedpan. Colostomy bag emptied. Turned side to side and no change in the left groin site. Her mood is happy. No further crying.  1810- Assisted to walk to the bathroom with a cane. Voided. Moved well but \"I can feel the soreness to the left side.\" Left groin site soft and has not changed with walking or voiding.  1815 Eating dinner.  1827 Left groin site stable and soft but site is painful.  1845- Site remains soft and has not changed.  1905- DC to home. Pain medicine is helping. She will also put ice on the site at home.  "

## 2018-02-23 NOTE — DISCHARGE INSTRUCTIONS
Cardiac Angiogram Discharge Instructions - Left Femoral    After you go home:      Have an adult stay with you until tomorrow.    Drink extra fluids for 2 days.    You may resume your normal diet.    No smoking       For 24 hours - due to the sedation you received:    Relax and take it easy.    Do NOT make any important or legal decisions.    Do NOT drive or operate machines at home or at work.    Do NOT drink alcohol.    Care of Groin Puncture Site:      For the first 24 hrs - check the puncture site every 1-2 hours while awake.    For 2 days, when you cough, sneeze, laugh or move your bowels, hold your hand over the puncture site and press firmly.    Remove the bandaid after 24 hours. If there is minor oozing, apply another bandaid and remove it after 12 hours.    It is normal to have a small bruise or pea size lump at the site.    You may shower tomorrow. Do NOT take a bath, or use a hot tub or pool for at least 3 days. Do NOT scrub the site. Do not use lotion or powder near the puncture site.    Activity:            For 2 days:    No stooping or squatting    Do NOT do any heavy activity such as exercise, lifting, or straining.     No housework, yard work or any activity that make you sweat    Do NOT lift more than 10 pounds    Bleeding:      If you start bleeding from the site in your groin, lie down flat and press firmly on/above the site for 10 minutes.     Once bleeding stops, lay flat for 2 hours.     Call [facility] as soon as you can.       Call 911 right away if you have heavy bleeding or bleeding that does not stop.      Medicines:          If you are on Metformin (Glucophage), do not restart it until you have blood tests (within 2 to 3 days after discharge).  After you have your blood drawn, you may restart the Metformin.     If you have stopped any medicines, check with your provider about when to restart them.    Follow Up Appointments:      Follow up with [facility] Nurse Practitioner at [facility] of patient preference in 7-10 days.    Call the clinic if:      You have increased pain or a large or growing hard lump around the site.    The site is red, swollen, hot or tender.    Blood or fluid is draining from the site.    You have chills or a fever greater than 101 F (38 C).    Your leg turns feels numb, cool or changes color.    You have hives, a rash or unusual itching.    New pain in the back or belly that you cannot control with Tylenol.    Any questions or concerns.          [facility] [place]:    [phone] [facility] (7 days a week)

## 2018-02-23 NOTE — IP AVS SNAPSHOT
MRN:[id number]                      After Visit Summary   2/23/2018    Chasity Hodgson    MRN: [id number]           Visit Information        Department      2/23/2018  5:44 AM [facility]          Review of your medicines      UNREVIEWED medicines. Ask your doctor about these medicines        Dose / Directions    ascorbic acid 1000 MG Tabs   Commonly known as:  vitamin C        Dose:  1000 mg   Take 1,000 mg by mouth daily   Refills:  0       ASTEPRO NA        Refills:  0       atenolol 50 MG tablet   Commonly known as:  TENORMIN   Used for:  Essential hypertension        Dose:  50 mg   Take 1 tablet (50 mg) by mouth 2 times daily   Quantity:  180 tablet   Refills:  3       atorvastatin 20 MG tablet   Commonly known as:  LIPITOR        Dose:  20 mg   Take 20 mg by mouth daily   Refills:  0       BIOTIN PO        Dose:  1000 mcg   Take 1,000 mcg by mouth   Refills:  0       clotrimazole 1 % cream   Commonly known as:  LOTRIMIN        Apply topically daily   Refills:  0       cycloSPORINE 0.05 % ophthalmic emulsion   Commonly known as:  RESTASIS        Dose:  1 drop   1 drop 2 times daily   Refills:  0       diphenhydrAMINE-acetaminophen  MG tablet   Commonly known as:  TYLENOL PM        Dose:  1 tablet   Take 1 tablet by mouth At Bedtime Reported on 3/20/2017   Refills:  0       famotidine 40 MG tablet   Commonly known as:  PEPCID        Dose:  40 mg   Take 40 mg by mouth At Bedtime   Refills:  0       fenofibrate 145 MG tablet   Used for:  CARDIOVASCULAR SCREENING; LDL GOAL LESS THAN 130        Dose:  145 mg   Take 1 tablet (145 mg) by mouth daily   Quantity:  90 tablet   Refills:  2       ferrous sulfate 325 (65 FE) MG tablet   Commonly known as:  IRON        Dose:  325 mg   Take 325 mg by mouth daily (with breakfast)   Refills:  0       fexofenadine 180 MG tablet   Commonly known as:  ALLEGRA        Dose:  180 mg   Take 180 mg by mouth daily.   Quantity:  120   Refills:  0        fluticasone 50 MCG/ACT spray   Commonly known as:  FLONASE   Used for:  Seasonal allergic rhinitis due to other allergic trigger        Dose:  2 spray   Spray 2 sprays in nostril daily 2 sprays in each nostril qd   Quantity:  1 Bottle   Refills:  0       gabapentin 100 MG capsule   Commonly known as:  NEURONTIN   Used for:  Poliomyelitis        Dose:  100 mg   Take 1 capsule (100 mg) by mouth every evening as needed   Quantity:  90 capsule   Refills:  3       hydrochlorothiazide 25 MG tablet   Commonly known as:  HYDRODIURIL   Used for:  Essential hypertension        Dose:  25 mg   Take 1 tablet (25 mg) by mouth daily   Quantity:  90 tablet   Refills:  3       lisinopril 20 MG tablet   Commonly known as:  PRINIVIL/ZESTRIL   Used for:  Essential hypertension        Dose:  20 mg   Take 1 tablet (20 mg) by mouth daily   Quantity:  90 tablet   Refills:  2       metFORMIN 500 MG tablet   Commonly known as:  GLUCOPHAGE        Dose:  500 mg   Take 500 mg by mouth daily (with dinner)   Refills:  0       metoclopramide 10 MG tablet   Commonly known as:  REGLAN        Take 1-2 tabs as needed   Refills:  0       multivitamin per tablet        ONE DAILY   Quantity:  100   Refills:  3       * nitroGLYcerin 0.4 MG sublingual tablet   Commonly known as:  NITROSTAT   Used for:  Atypical chest pain        Dose:  0.4 mg   Place 1 tablet (0.4 mg) under the tongue every 5 minutes as needed for chest pain (no more than 3 in one hour; after 3rd, call 911.)   Quantity:  25 tablet   Refills:  3       * nitroGLYcerin 0.3 MG/HR 24 hr patch   Commonly known as:  NITRODUR   Used for:  Coronary artery disease involving native heart without angina pectoris, unspecified vessel or lesion type, Insufficiency, arterial, peripheral (H)        Dose:  1 patch   Place 1 patch onto the skin daily   Quantity:  30 patch   Refills:  1       nystatin cream   Commonly known as:  MYCOSTATIN        Apply topically daily as needed   Refills:  0        nystatin-triamcinolone cream   Commonly known as:  MYCOLOG II        Apply topically daily as needed   Refills:  0       ondansetron 4 MG tablet   Commonly known as:  ZOFRAN        Dose:  1 tablet   Take 1 tablet by mouth every 6 hours as needed Reported on 3/20/2017   Refills:  0       pantoprazole 40 MG EC tablet   Commonly known as:  PROTONIX        Dose:  40 mg   Take 40 mg by mouth 2 times daily   Refills:  0       SB LOW DOSE ASA EC 81 MG EC tablet   Generic drug:  aspirin        Dose:  81 mg   Take 81 mg by mouth daily   Refills:  0       sitagliptin 50 MG tablet   Commonly known as:  JANUVIA        Dose:  50 mg   Take 50 mg by mouth daily   Refills:  0       sucralfate 1 GM/10ML suspension   Commonly known as:  CARAFATE        Dose:  1 g   Take 1 g by mouth 2 times daily as needed   Refills:  0       SYNTHROID 112 MCG tablet   Generic drug:  levothyroxine        Dose:  112 mcg   Take 112 mcg by mouth daily Take 112 mcg daily except for Friday takes an additional 56 mcg.  Brand name Synthroid   Refills:  0       TOUJEO SOLOSTAR 300 UNIT/ML injection   Generic drug:  insulin glargine U-300        Dose:  30 Units   Inject 30 Units Subcutaneous every morning Took 14 units this am   Refills:  0       triamcinolone 0.1 % cream   Commonly known as:  KENALOG        Apply topically daily   Refills:  0       VITAMIN D-3 PO        Dose:  2 tablet   Take 2 tablets by mouth   Refills:  0       * Notice:  This list has 2 medication(s) that are the same as other medications prescribed for you. Read the directions carefully, and ask your doctor or other care provider to review them with you.      CONTINUE these medicines which have NOT CHANGED        Dose / Directions    blood glucose monitoring test strip   Commonly known as:  no brand specified   Used for:  Type 2 diabetes mellitus with other specified complication (H)        Use to test blood sugar 1 times daily or as directed.   Quantity:  1 Box   Refills:  6       *  order for DME   Used for:  ANNETTE (obstructive sleep apnea)        Equipment being ordered: Oral appliance for sleep apnea   Quantity:  1 Units   Refills:  0       * order for DME   Used for:  Coccydynia        Donut Pillow   Quantity:  1 each   Refills:  0       * order for DME   Used for:  Insufficiency, arterial, peripheral (H)        Equipment being ordered: Compression stockings - Knee High; 20-30 mmHg compression   Quantity:  2 each   Refills:  0       * Notice:  This list has 3 medication(s) that are the same as other medications prescribed for you. Read the directions carefully, and ask your doctor or other care provider to review them with you.             Protect others around you: Learn how to safely use, store and throw away your medicines at www.disposemymeds.org.         Follow-ups after your visit        Your next 10 appointments already scheduled     Mar 05, 2018  3:00 PM CST   Return Visit with MAGALY Cat CNP   [facility] ([facility])    [address] [place] [id number] [phone]            Mar 12, 2018 10:30 AM CDT   (Arrive by 10:15 AM)   RETURN HAIRLOSS with Renetta Meyer MD   [facility] ([facility])    [address]  [address] [facility] [id number] [phone]               Care Instructions        After Care Instructions     Discharge Instructions - Follow up with [facility] Nurse Practitioner        Follow up with [facility] Nurse Practitioner at [facility] of patient preference in 7-10 days.            Discharge Instructions - IF on Metformin (Glucophage or Glucovance) or Metformin containing medications       IF on Metformin (Glucophage or Glucovance) or Metformin containing medications , schedule a Basic Metabolic Panel at [facility] or Primary Clinic in 48 - 72 hours post procedure and PRIOR TO resuming the Metformin or Metformin containing  medications.  Hold Metformin (Glucophage or Glucovance) or Metformin containing medications until after the Basic Metabolic Panel on the 2nd or 3rd day following the procedure.  May resume after blood draw is complete.                  Further instructions from your care team       Cardiac Angiogram Discharge Instructions - Left Femoral    After you go home:      Have an adult stay with you until tomorrow.    Drink extra fluids for 2 days.    You may resume your normal diet.    No smoking       For 24 hours - due to the sedation you received:    Relax and take it easy.    Do NOT make any important or legal decisions.    Do NOT drive or operate machines at home or at work.    Do NOT drink alcohol.    Care of Groin Puncture Site:      For the first 24 hrs - check the puncture site every 1-2 hours while awake.    For 2 days, when you cough, sneeze, laugh or move your bowels, hold your hand over the puncture site and press firmly.    Remove the bandaid after 24 hours. If there is minor oozing, apply another bandaid and remove it after 12 hours.    It is normal to have a small bruise or pea size lump at the site.    You may shower tomorrow. Do NOT take a bath, or use a hot tub or pool for at least 3 days. Do NOT scrub the site. Do not use lotion or powder near the puncture site.    Activity:            For 2 days:    No stooping or squatting    Do NOT do any heavy activity such as exercise, lifting, or straining.     No housework, yard work or any activity that make you sweat    Do NOT lift more than 10 pounds    Bleeding:      If you start bleeding from the site in your groin, lie down flat and press firmly on/above the site for 10 minutes.     Once bleeding stops, lay flat for 2 hours.     Call [facility] as soon as you can.       Call 911 right away if you have heavy bleeding or bleeding that does not stop.      Medicines:          If you are on Metformin (Glucophage), do not restart it until you have blood tests  (within 2 to 3 days after discharge).  After you have your blood drawn, you may restart the Metformin.     If you have stopped any medicines, check with your provider about when to restart them.    Follow Up Appointments:      Follow up with [facility] Nurse Practitioner at [facility] of patient preference in 7-10 days.    Call the clinic if:      You have increased pain or a large or growing hard lump around the site.    The site is red, swollen, hot or tender.    Blood or fluid is draining from the site.    You have chills or a fever greater than 101 F (38 C).    Your leg turns feels numb, cool or changes color.    You have hives, a rash or unusual itching.    New pain in the back or belly that you cannot control with Tylenol.    Any questions or concerns.          [facility] [place]:    [phone] [facility] (7 days a week)           Additional Information About Your Visit        [employer] Information     [employer] gives you secure access to your electronic health record. If you see a primary care provider, you can also send messages to your care team and make appointments. If you have questions, please call your primary care clinic.  If you do not have a primary care provider, please call [phone] and they will assist you.        Care EveryWhere ID     This is your Care EveryWhere ID. This could be used by other organizations to access your [place] medical records  [id number]        Your Vitals Were     Blood Pressure Pulse Temperature Respirations Height Weight    134/56 55 97.7  F (36.5  C) 14 1.524 m (5') 92.7 kg (204 lb 5 oz)    Pulse Oximetry BMI (Body Mass Index)                100% 39.9 kg/m2           Primary Care Provider Office Phone # Fax #    Shola Benoit [phone]      Equal Access to Services     CORINNE ROBB : Bang Olea, nicole izaguirre, jose tse. So  [facility] [phone].    ATENCIÓN: Si chrisla angela, tiene a modi disposición servicios gratuitos de asistencia lingüística. Romaine carr [phone].    We comply with applicable federal civil rights laws and [place] laws. We do not discriminate on the basis of race, color, national origin, age, disability, sex, sexual orientation, or gender identity.            Thank you!     Thank you for choosing [facility] for your care. Our goal is always to provide you with excellent care. Hearing back from our patients is one way we can continue to improve our services. Please take a few minutes to complete the written survey that you may receive in the mail after you visit with us. Thank you!             Medication List: This is a list of all your medications and when to take them. Check marks below indicate your daily home schedule. Keep this list as a reference.      Medications           Morning Afternoon Evening Bedtime As Needed    ascorbic acid 1000 MG Tabs   Commonly known as:  vitamin C   Take 1,000 mg by mouth daily                                ASTEPRO NA                                atenolol 50 MG tablet   Commonly known as:  TENORMIN   Take 1 tablet (50 mg) by mouth 2 times daily                                atorvastatin 20 MG tablet   Commonly known as:  LIPITOR   Take 20 mg by mouth daily                                BIOTIN PO   Take 1,000 mcg by mouth                                blood glucose monitoring test strip   Commonly known as:  no brand specified   Use to test blood sugar 1 times daily or as directed.                                clotrimazole 1 % cream   Commonly known as:  LOTRIMIN   Apply topically daily                                cycloSPORINE 0.05 % ophthalmic emulsion   Commonly known as:  RESTASIS   1 drop 2 times daily                                diphenhydrAMINE-acetaminophen  MG tablet   Commonly known as:  TYLENOL PM   Take 1 tablet by mouth At Bedtime Reported on 3/20/2017                                 famotidine 40 MG tablet   Commonly known as:  PEPCID   Take 40 mg by mouth At Bedtime                                fenofibrate 145 MG tablet   Take 1 tablet (145 mg) by mouth daily                                ferrous sulfate 325 (65 FE) MG tablet   Commonly known as:  IRON   Take 325 mg by mouth daily (with breakfast)                                fexofenadine 180 MG tablet   Commonly known as:  ALLEGRA   Take 180 mg by mouth daily.                                fluticasone 50 MCG/ACT spray   Commonly known as:  FLONASE   Spray 2 sprays in nostril daily 2 sprays in each nostril qd                                gabapentin 100 MG capsule   Commonly known as:  NEURONTIN   Take 1 capsule (100 mg) by mouth every evening as needed                                hydrochlorothiazide 25 MG tablet   Commonly known as:  HYDRODIURIL   Take 1 tablet (25 mg) by mouth daily                                lisinopril 20 MG tablet   Commonly known as:  PRINIVIL/ZESTRIL   Take 1 tablet (20 mg) by mouth daily                                metFORMIN 500 MG tablet   Commonly known as:  GLUCOPHAGE   Take 500 mg by mouth daily (with dinner)                                metoclopramide 10 MG tablet   Commonly known as:  REGLAN   Take 1-2 tabs as needed                                multivitamin per tablet   ONE DAILY                                * nitroGLYcerin 0.4 MG sublingual tablet   Commonly known as:  NITROSTAT   Place 1 tablet (0.4 mg) under the tongue every 5 minutes as needed for chest pain (no more than 3 in one hour; after 3rd, call 911.)                                * nitroGLYcerin 0.3 MG/HR 24 hr patch   Commonly known as:  NITRODUR   Place 1 patch onto the skin daily                                nystatin cream   Commonly known as:  MYCOSTATIN   Apply topically daily as needed                                nystatin-triamcinolone cream   Commonly known as:  MYCOLOG II   Apply  topically daily as needed                                ondansetron 4 MG tablet   Commonly known as:  ZOFRAN   Take 1 tablet by mouth every 6 hours as needed Reported on 3/20/2017                                * order for DME   Equipment being ordered: Oral appliance for sleep apnea                                * order for DME   Donut Pillow                                * order for DME   Equipment being ordered: Compression stockings - Knee High; 20-30 mmHg compression                                pantoprazole 40 MG EC tablet   Commonly known as:  PROTONIX   Take 40 mg by mouth 2 times daily                                SB LOW DOSE ASA EC 81 MG EC tablet   Take 81 mg by mouth daily   Generic drug:  aspirin                                sitagliptin 50 MG tablet   Commonly known as:  JANUVIA   Take 50 mg by mouth daily                                sucralfate 1 GM/10ML suspension   Commonly known as:  CARAFATE   Take 1 g by mouth 2 times daily as needed                                SYNTHROID 112 MCG tablet   Take 112 mcg by mouth daily Take 112 mcg daily except for Friday takes an additional 56 mcg.  Brand name Synthroid   Generic drug:  levothyroxine                                TOUJEO SOLOSTAR 300 UNIT/ML injection   Inject 30 Units Subcutaneous every morning Took 14 units this am   Generic drug:  insulin glargine U-300                                triamcinolone 0.1 % cream   Commonly known as:  KENALOG   Apply topically daily                                VITAMIN D-3 PO   Take 2 tablets by mouth                                * Notice:  This list has 5 medication(s) that are the same as other medications prescribed for you. Read the directions carefully, and ask your doctor or other care provider to review them with you.

## 2018-02-23 NOTE — PROGRESS NOTES
Returned to [facility] at 1055. Sleepy from extra sedation at end of procedure but awakens to voice. Fem stop in place per Dr Bhatia. Hematoma marked left groin area. Now soft without lumps or bumps. No oozing seen under femstop. Denies pain now. Pulses equal to pre-procedure. Will assess and monitor per orders.  talked with Dr Bhatia and will leave hospital, call and return later this afternoon.

## 2018-02-24 ENCOUNTER — NURSE TRIAGE (OUTPATIENT)
Dept: NURSING | Facility: CLINIC | Age: 73
End: 2018-02-24

## 2018-02-24 LAB — INTERPRETATION ECG - MUSE: NORMAL

## 2018-02-24 NOTE — TELEPHONE ENCOUNTER
Pt had angiogram yesterday (2/23) at [facility] w/ her cardiologist, Dr. Andreea Ortiz ([facility]). The procedure went well overall. She did develop a large bruise at the femoral insertion site. The nurse marked the bruise w/ a permanent marker yesterday so pt would be able to tell if bruise was getting larger. Pt states today the bruise is approx 5-6 inches x 13 inches. The bruise has increased in size by at least 2-3 inches. Pt was told some downward expansion would occur due to gravity but pt states the bruise has increased in size in all directions. There is tenderness at site, no severe pain. Some swelling. Some of the bruise is very dark red/purple but not raised. No other complaints. Advised pt we are going to have her talk w/ the cardiologist on-call about this. Paged on-call Dr. Clark @ 3:53pm to call pt directly at [phone]. Advised pt to call back if no call from  in 20-30 min. Pt voiced understanding and agreement w/ this plan. Naomi Mendoza RN/FNA      Reason for Disposition    Caller has URGENT question and triager unable to answer question    Additional Information    Post-operative bruising    Negative: Chest pain    Negative: Difficulty breathing    Negative: Surgical incision symptoms and questions    Negative: [1] Discomfort (pain, burning or stinging) when passing urine AND [2] male    Negative: [1] Discomfort (pain, burning or stinging) when passing urine AND [2] female    Negative: Constipation    Negative: Calf pain    Negative: Dizziness is severe, or persists > 24 hours after surgery    Negative: Pain, redness, swelling, or pus at IV Site    Negative: Symptoms arising from use of a urinary catheter (Bello or Coude)    Negative: Cast problems or questions    Negative: Medication question    Negative: [1] Widespread rash AND [2] bright red, sunburn-like    Negative: [1] SEVERE headache AND [2] after spinal (epidural) anesthesia    Negative: [1] Vomiting AND [2] persists > 4 hours     Negative: [1] Vomiting AND [2] abdomen looks much more swollen than usual    Negative: [1] Drinking very little AND [2] dehydration suspected (e.g., no urine > 12 hours, very dry mouth, very lightheaded)    Negative: Patient sounds very sick or weak to the triager    Negative: Sounds like a serious complication to the triager    Negative: Fever > 100.5 F (38.1 C)    Negative: [1] SEVERE post-op pain (e.g., excruciating, pain scale 8-10) AND [2] not controlled with pain medications    Protocols used: POST-OP SYMPTOMS AND QUESTIONS-ADULT-, BRUISES-ADULT-

## 2018-02-26 ENCOUNTER — OFFICE VISIT (OUTPATIENT)
Dept: CARDIOLOGY | Facility: CLINIC | Age: 73
End: 2018-02-26
Payer: MEDICARE

## 2018-02-26 ENCOUNTER — HOSPITAL ENCOUNTER (OUTPATIENT)
Dept: ULTRASOUND IMAGING | Facility: CLINIC | Age: 73
Discharge: HOME | End: 2018-02-26
Attending: NURSE PRACTITIONER | Admitting: NURSE PRACTITIONER
Payer: MEDICARE

## 2018-02-26 ENCOUNTER — TELEPHONE (OUTPATIENT)
Dept: CARDIOLOGY | Facility: CLINIC | Age: 73
End: 2018-02-26

## 2018-02-26 VITALS
DIASTOLIC BLOOD PRESSURE: 74 MMHG | SYSTOLIC BLOOD PRESSURE: 140 MMHG | HEIGHT: 62 IN | HEART RATE: 64 BPM | WEIGHT: 203 LBS | BODY MASS INDEX: 37.36 KG/M2

## 2018-02-26 DIAGNOSIS — R09.89 FEMORAL BRUIT: ICD-10-CM

## 2018-02-26 DIAGNOSIS — I10 BENIGN ESSENTIAL HYPERTENSION: ICD-10-CM

## 2018-02-26 DIAGNOSIS — I25.10 CORONARY ARTERY DISEASE INVOLVING NATIVE HEART WITHOUT ANGINA PECTORIS, UNSPECIFIED VESSEL OR LESION TYPE: Primary | ICD-10-CM

## 2018-02-26 DIAGNOSIS — I25.10 CORONARY ARTERY DISEASE INVOLVING NATIVE HEART WITHOUT ANGINA PECTORIS, UNSPECIFIED VESSEL OR LESION TYPE: ICD-10-CM

## 2018-02-26 DIAGNOSIS — I25.10 CORONARY ARTERY DISEASE INVOLVING NATIVE CORONARY ARTERY OF NATIVE HEART WITHOUT ANGINA PECTORIS: ICD-10-CM

## 2018-02-26 DIAGNOSIS — I25.10 CORONARY ARTERY DISEASE INVOLVING NATIVE CORONARY ARTERY OF NATIVE HEART WITHOUT ANGINA PECTORIS: Primary | ICD-10-CM

## 2018-02-26 LAB
ANION GAP SERPL CALCULATED.3IONS-SCNC: 10.8 MMOL/L (ref 6–17)
BUN SERPL-MCNC: 27 MG/DL (ref 7–30)
CALCIUM SERPL-MCNC: 9.6 MG/DL (ref 8.5–10.5)
CHLORIDE SERPL-SCNC: 103 MMOL/L (ref 98–107)
CO2 SERPL-SCNC: 29 MMOL/L (ref 23–29)
CREAT SERPL-MCNC: 1.29 MG/DL (ref 0.7–1.3)
GFR SERPL CREATININE-BSD FRML MDRD: 41 ML/MIN/1.7M2
GLUCOSE SERPL-MCNC: 158 MG/DL (ref 70–105)
POTASSIUM SERPL-SCNC: 4.8 MMOL/L (ref 3.5–5.1)
SODIUM SERPL-SCNC: 138 MMOL/L (ref 136–145)

## 2018-02-26 PROCEDURE — 99213 OFFICE O/P EST LOW 20 MIN: CPT | Performed by: NURSE PRACTITIONER

## 2018-02-26 PROCEDURE — 93926 LOWER EXTREMITY STUDY: CPT | Mod: LT

## 2018-02-26 PROCEDURE — 36415 COLL VENOUS BLD VENIPUNCTURE: CPT | Performed by: NURSE PRACTITIONER

## 2018-02-26 PROCEDURE — 93926 LOWER EXTREMITY STUDY: CPT | Mod: 26 | Performed by: INTERNAL MEDICINE

## 2018-02-26 PROCEDURE — 80048 BASIC METABOLIC PNL TOTAL CA: CPT | Performed by: NURSE PRACTITIONER

## 2018-02-26 NOTE — PROGRESS NOTES
HPI and Plan:   See dictation    Orders Placed This Encounter   Procedures     US Lower Extremity Arterial Duplex Left     No orders of the defined types were placed in this encounter.    There are no discontinued medications.      Encounter Diagnoses   Name Primary?     Coronary artery disease involving native heart without angina pectoris, unspecified vessel or lesion type Yes     Femoral bruit      Benign essential hypertension        CURRENT MEDICATIONS:  Current Outpatient Prescriptions   Medication Sig Dispense Refill     atorvastatin (LIPITOR) 20 MG tablet Take 20 mg by mouth daily       ferrous sulfate (IRON) 325 (65 FE) MG tablet Take 325 mg by mouth daily (with breakfast)       sucralfate (CARAFATE) 1 GM/10ML suspension Take 1 g by mouth 2 times daily as needed       nitroGLYcerin (NITRODUR) 0.3 MG/HR 24 hr patch Place 1 patch onto the skin daily 30 patch 1     atenolol (TENORMIN) 50 MG tablet Take 1 tablet (50 mg) by mouth 2 times daily 180 tablet 3     gabapentin (NEURONTIN) 100 MG capsule Take 1 capsule (100 mg) by mouth every evening as needed 90 capsule 3     metFORMIN (GLUCOPHAGE) 500 MG tablet Take 500 mg by mouth daily (with dinner)        hydrochlorothiazide (HYDRODIURIL) 25 MG tablet Take 1 tablet (25 mg) by mouth daily 90 tablet 3     pantoprazole (PROTONIX) 40 MG EC tablet Take 40 mg by mouth 2 times daily       insulin glargine U-300 (TOUJEO SOLOSTAR) 300 UNIT/ML injection Inject 30 Units Subcutaneous every morning Took 14 units this am       famotidine (PEPCID) 40 MG tablet Take 40 mg by mouth At Bedtime       Azelastine HCl (ASTEPRO NA)        triamcinolone (KENALOG) 0.1 % cream Apply topically daily       clotrimazole (LOTRIMIN) 1 % cream Apply topically daily       nitroGLYcerin (NITROSTAT) 0.4 MG sublingual tablet Place 1 tablet (0.4 mg) under the tongue every 5 minutes as needed for chest pain (no more than 3 in one hour; after 3rd, call 911.) 25 tablet 3     lisinopril  (PRINIVIL/ZESTRIL) 20 MG tablet Take 1 tablet (20 mg) by mouth daily 90 tablet 2     fenofibrate 145 MG tablet Take 1 tablet (145 mg) by mouth daily 90 tablet 2     order for DME Equipment being ordered: Compression stockings - Knee High; 20-30 mmHg compression 2 each 0     order for DME Donut Pillow 1 each 0     metoclopramide (REGLAN) 10 MG tablet Take 1-2 tabs as needed       fluticasone (FLONASE) 50 MCG/ACT spray Spray 2 sprays in nostril daily 2 sprays in each nostril qd 1 Bottle 0     order for DME Equipment being ordered: Oral appliance for sleep apnea 1 Units 0     cycloSPORINE (RESTASIS) 0.05 % ophthalmic emulsion 1 drop 2 times daily       sitagliptin (JANUVIA) 50 MG tablet Take 50 mg by mouth daily       Cholecalciferol (VITAMIN D-3 PO) Take 2 tablets by mouth       BIOTIN PO Take 1,000 mcg by mouth       blood glucose monitoring (NO BRAND SPECIFIED) test strip Use to test blood sugar 1 times daily or as directed. 1 Box 6     nystatin-triamcinolone (MYCOLOG II) cream Apply topically daily as needed       ascorbic acid (VITAMIN C) 1000 MG TABS Take 1,000 mg by mouth daily       ondansetron (ZOFRAN) 4 MG tablet Take 1 tablet by mouth every 6 hours as needed Reported on 3/20/2017       aspirin (SB LOW DOSE ASA EC) 81 MG EC tablet Take 81 mg by mouth daily       nystatin (MYCOSTATIN) cream Apply topically daily as needed        levothyroxine (SYNTHROID) 112 MCG tablet Take 112 mcg by mouth daily Take 112 mcg daily except for Friday takes an additional 56 mcg.  Brand name Synthroid       diphenhydrAMINE-acetaminophen (TYLENOL PM)  MG tablet Take 1 tablet by mouth At Bedtime Reported on 3/20/2017       fexofenadine (ALLEGRA) 180 MG tablet Take 180 mg by mouth daily. 120 0     MULTIVITAMINS OR TABS ONE DAILY 100 3       ALLERGIES     Allergies   Allergen Reactions     Nsaids Difficulty breathing     Increased creatinine     Toradol Difficulty breathing     Shortness of breath     Celecoxib Itching and Rash      Codeine Itching     With higher doses     Crestor [Rosuvastatin] Muscle Pain (Myalgia)     No Clinical Screening - See Comments Itching     Fragrance     Vioxx Other (See Comments)     Heart races     Conjugated Estrogens Rash     Sulfa Drugs Rash       PAST MEDICAL HISTORY:  Past Medical History:   Diagnosis Date     Abdominal adhesions 1984, 96,99    s/p lysis     Allergic rhinitis, cause unspecified      Carotid stenosis      CPAP (continuous positive airway pressure) dependence      Diabetic gastroparesis (H)      Diet-controlled type 2 diabetes mellitus (H)      DVT of axillary vein, acute right (H)      Fibromyalgia      Gastro-oesophageal reflux disease      Hernia of unspecified site of abdominal cavity without mention of obstruction or gangrene     bilateral     HTN (hypertension)      Hypertriglyceridemia      Obstructive sleep apnea      ANNETTE (obstructive sleep apnea)      Papillary carcinoma of thyroid (H)     s/p thyroidectomy - Ruegemer     PE (pulmonary embolism)      Poliomyelitis     poor circulation right leg     Postsurgical hypothyroidism     s/p papillary thryoid cancer - Ruegemer     Pulmonary embolism (H)      Rosacea      S/P carpal tunnel release     bilateral     S/P hardware removal 01/2014    still with lingering foot pain     S/P shoulder surgery     bilateral     Septic joint (H)     right knee     Venous insufficiency      Venous thrombosis 1999    right axillary vein       PAST SURGICAL HISTORY:  Past Surgical History:   Procedure Laterality Date     AMPUTATE TOE(S)  3/15/2012    Procedure:AMPUTATE TOE(S); Surgeon:RUBEN MOSES; Location: OR     APPENDECTOMY  1972     ARTHRODESIS FOOT  3/15/2012    Procedure:ARTHRODESIS FOOT; RIGHT TRIPLE ARTHRODESIS, FIFTH TOE AMPUTATION, LATERAL LIGAMENT RECONSTRUCTION, TENDON TRANSFER AND RELEASE [MINI C-ARM, ARTHREX 4.5 AND 6.7 STAPLES, BIOCOMPOSITE TENODESIS SCREWS]; Surgeon:RUBEN MOSES; [facility]  BOWEL ADHESION,ENTEROLYSIS      1986, 1996, 1999     C NONSPECIFIC PROCEDURE      throidectomy     C TOTAL ABDOM HYSTERECTOMY  1980    + BSO     CHOLECYSTECTOMY       COLOSTOMY  2/7/2012    Procedure:COLOSTOMY; CREATION OF SIGMOID COLOSTOMY AND EXTENSIVE  LYSIS OF ADHESIONS; Surgeon:MONTSERRAT BENDER; Location: OR     GI SURGERY      weakened rectal sphincter with artificial stimulator     LAPAROTOMY, LYSIS ADHESIONS, COMBINED  2/7/2012    Procedure:COMBINED LAPAROTOMY, LYSIS ADHESIONS; Surgeon:MONTSERRAT BENDER; Location: OR     RELEASE TENDON FOOT  3/15/2012    Procedure:RELEASE TENDON FOOT; Surgeon:SUKHDEEP METZ; Location: OR     REMOVE HARDWARE FOOT  12/13/2012    Procedure: REMOVE HARDWARE FOOT;  RIGHT FOOT REMOVAL OF HARDWARE;  Surgeon: Sukhdeep Metz MD;  Location:  OR       FAMILY HISTORY:  Family History   Problem Relation Age of Onset     Arthritis Mother      Hypertension Mother      CEREBROVASCULAR DISEASE Mother      Obesity Mother      HEART DISEASE Mother      MI's     Hypertension Father      Respiratory Father      Adult RDS     DIABETES Father      adult     Arthritis Sister      CANCER Sister      Arthritis Sister      Hypertension Sister      CANCER Sister      colon polup     HEART DISEASE Brother      MI at 54     Hypertension Sister      Lipids Brother      Hypertension Brother      Lipids Sister      Obesity Sister      Obesity Maternal Grandmother      Skin Cancer Maternal Grandmother      skin cancer unknown     CANCER Maternal Grandmother      unknown skin cancer on face       SOCIAL HISTORY:  Social History     Social History     Marital status:      Spouse name: N/A     Number of children: N/A     Years of education: N/A     Social History Main Topics     Smoking status: Never Smoker     Smokeless tobacco: Never Used     Alcohol use No      Comment: very rare     Drug use: No     Sexual activity: Not Currently     Partners: Male     Birth control/  "protection: Female Surgical     Other Topics Concern     Caffeine Concern Yes     occ     Sleep Concern Yes     Stress Concern Yes     Special Diet Yes     low carb, low sugar      Exercise No     occ. 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[employer]        Review of Systems:  Skin:  Positive for bruising (left groin area)   Eyes:  Positive for glasses  ENT:  Negative    Respiratory:  Positive for sleep apnea;CPAP  Cardiovascular:  Negative    Gastroenterology: Positive for reflux;nausea;abdominal pain  Genitourinary:  not assessed    Musculoskeletal:  Positive for joint pain;arthritis;back pain  Neurologic:  Positive for stroke;headaches;migraine headaches  Psychiatric:  Negative    Heme/Lymph/Imm:  Positive for allergies  Endocrine:  Positive for thyroid disorder;diabetes    Physical Exam:  Vitals: /74  Pulse 64  Ht 1.562 m (5' 1.5\")  Wt 92.1 kg (203 lb)  BMI 37.74 kg/m2    Constitutional:  cooperative obese      Skin:  warm and dry to the touch          Head:  normocephalic        Eyes:  pupils equal and round        Lymph:      ENT:  no pallor or cyanosis        Neck:  JVP normal;no stiffness        Respiratory:  normal breath sounds, clear to auscultation, normal A-P diameter, normal symmetry, normal respiratory excursion, no use of accessory muscles         Cardiac: regular rhythm;normal S1 and S2   distant heart sounds                right femoral artery;0 right radial artery;3+ right carotid artery;2+   right popliteal artery;2+     left femoral artery;3+ left radial artery;3+ left carotid artery;2+         right femoral bruit (-) left femoral bruit (+) right leg cool \"since polio\"    GI:  abdomen soft obese      Extremities and Muscular Skeletal:      bilateral LE edema;trace     brace on right ankle, left thigh with extensive ecchymosis into medial/lateral thigh, soft but extensive with +bruit at " site.    Neurological:    limb weakness;foot drop right LLE    Psych:  Alert and Oriented x 3        Recent Lab Results:  LIPID RESULTS:  Lab Results   Component Value Date    CHOL 202 (H) 08/01/2017    HDL 35 (L) 08/01/2017    LDL 49 02/15/2017    TRIG 451 (H) 08/01/2017    CHOLHDLRATIO 4.6 02/15/2017    CHOLHDLRATIO 4.6 03/31/2015       LIVER ENZYME RESULTS:  Lab Results   Component Value Date    AST 28 02/12/2018    ALT 29 02/12/2018       CBC RESULTS:  Lab Results   Component Value Date    WBC 8.0 02/23/2018    RBC 3.71 (L) 02/23/2018    HGB 11.2 (L) 02/23/2018    HCT 32.7 (L) 02/23/2018    MCV 88 02/23/2018    MCH 30.2 02/23/2018    MCHC 34.3 02/23/2018    RDW 12.4 02/23/2018     02/23/2018       BMP RESULTS:  Lab Results   Component Value Date     02/26/2018    POTASSIUM 4.8 02/26/2018    CHLORIDE 103 02/26/2018    CO2 29 02/26/2018    ANIONGAP 10.8 02/26/2018     (H) 02/26/2018    BUN 27 02/26/2018    CR 1.29 02/26/2018    GFRESTIMATED 41 (L) 02/26/2018    GFRESTBLACK 49 (L) 02/26/2018    RINKU 9.6 02/26/2018        A1C RESULTS:  Lab Results   Component Value Date    A1C 6.5 (H) 02/12/2018       INR RESULTS:  Lab Results   Component Value Date    INR 0.98 02/23/2018    INR 2.00 (H) 11/18/2013           CC  Shola Benoit MD  [address]  [place] [address]

## 2018-02-26 NOTE — LETTER
2/26/2018      Shola Benoit MD, MD  [address] [facility] [address]  [facility] [address]      RE: Chasity SU Rema       Dear Colleague,    I had the pleasure of seeing Chasity Hodgson in the [facility].    Service Date: 02/26/2018      HISTORY OF PRESENT ILLNESS:  Chasity was added on to my schedule more urgently today as she called in to report an extending bruise from her femoral site into the inner aspect of her thigh and along the back of her thigh.  She reported the area being very dark with some discomfort.  Therefore, she is here today for an evaluation.  I met Chasity last week for an evaluation of an abnormal stress test.  She was set up for a coronary angiogram that was done on 02/23/2018.  This stress test was done as the patient was being worked up for a right shoulder replacement surgery.  The stress test suggested anterior septal ischemia.  She underwent a left femoral access site for her angiogram.  The findings were negative for any significant coronary artery disease, just minimal disease was observed.  A FemoStop was placed in her left groin site; however, she developed a hematoma.  The area was marked by the nursing staff.  When she was discharged [facility], there was no oozing, her groin site was soft; however, the area was .  Ice was applied for pain control.  In review of [facility], however, the patient called the on-call service over the weekend, who told her to give our office a call if the patient felt the bruising area had grown.  The patient is here today for another evaluation.      The patient is diabetic.  She is on metformin.  Her metformin has been held since her coronary angiogram.  A basic metabolic panel was ordered today prior to her resuming her metformin.  Today Chasity reports concern about the amount of bruising.  She reports that the site is tender.  She reports some lightheadedness with position change.  She denies chest pain or chest  pressure.      PHYSICAL EXAMINATION:   VITAL SIGNS:  Blood pressure today is 140/74, pulse is 64, weight today is 206.   CARDIAC:  Heart tones are regular with an S1, S2 without an S3, S4.   LUNGS:  Clear without crackles or wheezes.   EXTREMITIES:  Left femoral site positive for bruit.  Area with a quite extended bruised area migrating prison down her thigh into the anterior aspect of her thigh, extending out to the lateral aspect of her thigh.  The ecchymotic area is soft.  There are no areas of pulsation or firmness.      IMPRESSION AND PLAN:  This is a 72-year-old patient who is here today in followup for further evaluation of her left femoral site that was used to perform a left heart catheterization.  Hematoma was noted soon after the procedure.  Since Friday, the area of ecchymosis has extended.  Today, I will order a left femoral ultrasound to rule out a pseudo-aneurysm because of the presence of an auscultated bruit.  I reinforced the patient could use ice for comfort.  I will notify the patient the results of the ultrasound.      Today, I reviewed the results of the coronary angiogram and told the patient she can reschedule her right shoulder replacement surgery.      Patient with a history of atypical chest pain.  The patient denies any problems with her chest pain over the weekend.      It has been my pleasure to see Sammie again.      Imani Louie MS, ANP         MAGALY SINGH, CNP             D: 2018   T: 2018   MT: MARY LOU      Name:     SUZANNE BROOKE   MRN:      [id number]        Account:      [id number]   :      1945           Service Date: 2018      Document: [id number]           Outpatient Encounter Prescriptions as of 2018   Medication Sig Dispense Refill     atorvastatin (LIPITOR) 20 MG tablet Take 20 mg by mouth daily       ferrous sulfate (IRON) 325 (65 FE) MG tablet Take 325 mg by mouth daily (with breakfast)       sucralfate (CARAFATE) 1 GM/10ML suspension Take  1 g by mouth 2 times daily as needed       nitroGLYcerin (NITRODUR) 0.3 MG/HR 24 hr patch Place 1 patch onto the skin daily 30 patch 1     atenolol (TENORMIN) 50 MG tablet Take 1 tablet (50 mg) by mouth 2 times daily 180 tablet 3     gabapentin (NEURONTIN) 100 MG capsule Take 1 capsule (100 mg) by mouth every evening as needed 90 capsule 3     metFORMIN (GLUCOPHAGE) 500 MG tablet Take 500 mg by mouth daily (with dinner)        hydrochlorothiazide (HYDRODIURIL) 25 MG tablet Take 1 tablet (25 mg) by mouth daily 90 tablet 3     pantoprazole (PROTONIX) 40 MG EC tablet Take 40 mg by mouth 2 times daily       insulin glargine U-300 (TOUJEO SOLOSTAR) 300 UNIT/ML injection Inject 30 Units Subcutaneous every morning Took 14 units this am       famotidine (PEPCID) 40 MG tablet Take 40 mg by mouth At Bedtime       Azelastine HCl (ASTEPRO NA)        triamcinolone (KENALOG) 0.1 % cream Apply topically daily       clotrimazole (LOTRIMIN) 1 % cream Apply topically daily       nitroGLYcerin (NITROSTAT) 0.4 MG sublingual tablet Place 1 tablet (0.4 mg) under the tongue every 5 minutes as needed for chest pain (no more than 3 in one hour; after 3rd, call 911.) 25 tablet 3     lisinopril (PRINIVIL/ZESTRIL) 20 MG tablet Take 1 tablet (20 mg) by mouth daily 90 tablet 2     fenofibrate 145 MG tablet Take 1 tablet (145 mg) by mouth daily 90 tablet 2     order for DME Equipment being ordered: Compression stockings - Knee High; 20-30 mmHg compression 2 each 0     order for DME Donut Pillow 1 each 0     metoclopramide (REGLAN) 10 MG tablet Take 1-2 tabs as needed       fluticasone (FLONASE) 50 MCG/ACT spray Spray 2 sprays in nostril daily 2 sprays in each nostril qd 1 Bottle 0     order for DME Equipment being ordered: Oral appliance for sleep apnea 1 Units 0     cycloSPORINE (RESTASIS) 0.05 % ophthalmic emulsion 1 drop 2 times daily       sitagliptin (JANUVIA) 50 MG tablet Take 50 mg by mouth daily       Cholecalciferol (VITAMIN D-3 PO) Take  2 tablets by mouth       BIOTIN PO Take 1,000 mcg by mouth       blood glucose monitoring (NO BRAND SPECIFIED) test strip Use to test blood sugar 1 times daily or as directed. 1 Box 6     nystatin-triamcinolone (MYCOLOG II) cream Apply topically daily as needed       ascorbic acid (VITAMIN C) 1000 MG TABS Take 1,000 mg by mouth daily       ondansetron (ZOFRAN) 4 MG tablet Take 1 tablet by mouth every 6 hours as needed Reported on 3/20/2017       aspirin (SB LOW DOSE ASA EC) 81 MG EC tablet Take 81 mg by mouth daily       nystatin (MYCOSTATIN) cream Apply topically daily as needed        levothyroxine (SYNTHROID) 112 MCG tablet Take 112 mcg by mouth daily Take 112 mcg daily except for Friday takes an additional 56 mcg.  Brand name Synthroid       diphenhydrAMINE-acetaminophen (TYLENOL PM)  MG tablet Take 1 tablet by mouth At Bedtime Reported on 3/20/2017       fexofenadine (ALLEGRA) 180 MG tablet Take 180 mg by mouth daily. 120 0     MULTIVITAMINS OR TABS ONE DAILY 100 3     No facility-administered encounter medications on file as of 2/26/2018.        Again, thank you for allowing me to participate in the care of your patient.      Sincerely,    MAGALY Bradford [facility]

## 2018-02-26 NOTE — Clinical Note
2/26/2018    Shola Benoit MD, MD  [address]  [facility] [address]    RE: Chasity Hodgson       Dear Colleague,    I had the pleasure of seeing Chasity Hodgson in the [facility].    HPI and Plan:   See dictation    Orders Placed This Encounter   Procedures     US Lower Extremity Arterial Duplex Left     No orders of the defined types were placed in this encounter.    There are no discontinued medications.      Encounter Diagnoses   Name Primary?     Coronary artery disease involving native heart without angina pectoris, unspecified vessel or lesion type Yes     Femoral bruit      Benign essential hypertension        CURRENT MEDICATIONS:  Current Outpatient Prescriptions   Medication Sig Dispense Refill     atorvastatin (LIPITOR) 20 MG tablet Take 20 mg by mouth daily       ferrous sulfate (IRON) 325 (65 FE) MG tablet Take 325 mg by mouth daily (with breakfast)       sucralfate (CARAFATE) 1 GM/10ML suspension Take 1 g by mouth 2 times daily as needed       nitroGLYcerin (NITRODUR) 0.3 MG/HR 24 hr patch Place 1 patch onto the skin daily 30 patch 1     atenolol (TENORMIN) 50 MG tablet Take 1 tablet (50 mg) by mouth 2 times daily 180 tablet 3     gabapentin (NEURONTIN) 100 MG capsule Take 1 capsule (100 mg) by mouth every evening as needed 90 capsule 3     metFORMIN (GLUCOPHAGE) 500 MG tablet Take 500 mg by mouth daily (with dinner)        hydrochlorothiazide (HYDRODIURIL) 25 MG tablet Take 1 tablet (25 mg) by mouth daily 90 tablet 3     pantoprazole (PROTONIX) 40 MG EC tablet Take 40 mg by mouth 2 times daily       insulin glargine U-300 (TOUJEO SOLOSTAR) 300 UNIT/ML injection Inject 30 Units Subcutaneous every morning Took 14 units this am       famotidine (PEPCID) 40 MG tablet Take 40 mg by mouth At Bedtime       Azelastine HCl (ASTEPRO NA)        triamcinolone (KENALOG) 0.1 % cream Apply topically daily       clotrimazole (LOTRIMIN) 1 % cream Apply topically daily        nitroGLYcerin (NITROSTAT) 0.4 MG sublingual tablet Place 1 tablet (0.4 mg) under the tongue every 5 minutes as needed for chest pain (no more than 3 in one hour; after 3rd, call 911.) 25 tablet 3     lisinopril (PRINIVIL/ZESTRIL) 20 MG tablet Take 1 tablet (20 mg) by mouth daily 90 tablet 2     fenofibrate 145 MG tablet Take 1 tablet (145 mg) by mouth daily 90 tablet 2     order for DME Equipment being ordered: Compression stockings - Knee High; 20-30 mmHg compression 2 each 0     order for DME Donut Pillow 1 each 0     metoclopramide (REGLAN) 10 MG tablet Take 1-2 tabs as needed       fluticasone (FLONASE) 50 MCG/ACT spray Spray 2 sprays in nostril daily 2 sprays in each nostril qd 1 Bottle 0     order for DME Equipment being ordered: Oral appliance for sleep apnea 1 Units 0     cycloSPORINE (RESTASIS) 0.05 % ophthalmic emulsion 1 drop 2 times daily       sitagliptin (JANUVIA) 50 MG tablet Take 50 mg by mouth daily       Cholecalciferol (VITAMIN D-3 PO) Take 2 tablets by mouth       BIOTIN PO Take 1,000 mcg by mouth       blood glucose monitoring (NO BRAND SPECIFIED) test strip Use to test blood sugar 1 times daily or as directed. 1 Box 6     nystatin-triamcinolone (MYCOLOG II) cream Apply topically daily as needed       ascorbic acid (VITAMIN C) 1000 MG TABS Take 1,000 mg by mouth daily       ondansetron (ZOFRAN) 4 MG tablet Take 1 tablet by mouth every 6 hours as needed Reported on 3/20/2017       aspirin (SB LOW DOSE ASA EC) 81 MG EC tablet Take 81 mg by mouth daily       nystatin (MYCOSTATIN) cream Apply topically daily as needed        levothyroxine (SYNTHROID) 112 MCG tablet Take 112 mcg by mouth daily Take 112 mcg daily except for Friday takes an additional 56 mcg.  Brand name Synthroid       diphenhydrAMINE-acetaminophen (TYLENOL PM)  MG tablet Take 1 tablet by mouth At Bedtime Reported on 3/20/2017       fexofenadine (ALLEGRA) 180 MG tablet Take 180 mg by mouth daily. 120 0     MULTIVITAMINS OR TABS  ONE DAILY 100 3       ALLERGIES     Allergies   Allergen Reactions     Nsaids Difficulty breathing     Increased creatinine     Toradol Difficulty breathing     Shortness of breath     Celecoxib Itching and Rash     Codeine Itching     With higher doses     Crestor [Rosuvastatin] Muscle Pain (Myalgia)     No Clinical Screening - See Comments Itching     Fragrance     Vioxx Other (See Comments)     Heart races     Conjugated Estrogens Rash     Sulfa Drugs Rash       PAST MEDICAL HISTORY:  Past Medical History:   Diagnosis Date     Abdominal adhesions 1984, 96,99    s/p lysis     Allergic rhinitis, cause unspecified      Carotid stenosis      CPAP (continuous positive airway pressure) dependence      Diabetic gastroparesis (H)      Diet-controlled type 2 diabetes mellitus (H)      DVT of axillary vein, acute right (H)      Fibromyalgia      Gastro-oesophageal reflux disease      Hernia of unspecified site of abdominal cavity without mention of obstruction or gangrene     bilateral     HTN (hypertension)      Hypertriglyceridemia      Obstructive sleep apnea      ANNETTE (obstructive sleep apnea)      Papillary carcinoma of thyroid (H)     s/p thyroidectomy - Ruegemer     PE (pulmonary embolism)      Poliomyelitis     poor circulation right leg     Postsurgical hypothyroidism     s/p papillary thryoid cancer - Ruegemer     Pulmonary embolism (H)      Rosacea      S/P carpal tunnel release     bilateral     S/P hardware removal 01/2014    still with lingering foot pain     S/P shoulder surgery     bilateral     Septic joint (H)     right knee     Venous insufficiency      Venous thrombosis 1999    right axillary vein       PAST SURGICAL HISTORY:  Past Surgical History:   Procedure Laterality Date     AMPUTATE TOE(S)  3/15/2012    Procedure:AMPUTATE TOE(S); Surgeon:RUBEN OMSES; Location:[facility] OR     APPENDECTOMY  1972     ARTHRODESIS FOOT  3/15/2012    Procedure:ARTHRODESIS FOOT; RIGHT TRIPLE ARTHRODESIS, FIFTH TOE  AMPUTATION, LATERAL LIGAMENT RECONSTRUCTION, TENDON TRANSFER AND RELEASE [MINI C-ARM, ARTHREX 4.5 AND 6.7 STAPLES, BIOCOMPOSITE TENODESIS SCREWS]; Surgeon:SUKHDEEP METZ; Location:[facility] OR     C FREEING BOWEL ADHESION,ENTEROLYSIS      1986, 1996, 1999     C NONSPECIFIC PROCEDURE      throidectomy     C TOTAL ABDOM HYSTERECTOMY  1980    + BSO     CHOLECYSTECTOMY       COLOSTOMY  2/7/2012    Procedure:COLOSTOMY; CREATION OF SIGMOID COLOSTOMY AND EXTENSIVE  LYSIS OF ADHESIONS; Surgeon:MONTSERRAT BENDER; Location: OR     GI SURGERY      weakened rectal sphincter with artificial stimulator     LAPAROTOMY, LYSIS ADHESIONS, COMBINED  2/7/2012    Procedure:COMBINED LAPAROTOMY, LYSIS ADHESIONS; Surgeon:MONTSERRAT BENDER; Location: OR     RELEASE TENDON FOOT  3/15/2012    Procedure:RELEASE TENDON FOOT; Surgeon:SUKHDEEP METZ; Location: OR     REMOVE HARDWARE FOOT  12/13/2012    Procedure: REMOVE HARDWARE FOOT;  RIGHT FOOT REMOVAL OF HARDWARE;  Surgeon: Sukhdeep Metz MD;  Location:  OR       FAMILY HISTORY:  Family History   Problem Relation Age of Onset     Arthritis Mother      Hypertension Mother      CEREBROVASCULAR DISEASE Mother      Obesity Mother      HEART DISEASE Mother      MI's     Hypertension Father      Respiratory Father      Adult RDS     DIABETES Father      adult     Arthritis Sister      CANCER Sister      Arthritis Sister      Hypertension Sister      CANCER Sister      colon polup     HEART DISEASE Brother      MI at 54     Hypertension Sister      Lipids Brother      Hypertension Brother      Lipids Sister      Obesity Sister      Obesity Maternal Grandmother      Skin Cancer Maternal Grandmother      skin cancer unknown     CANCER Maternal Grandmother      unknown skin cancer on face       SOCIAL HISTORY:  Social History     Social History     Marital status:      Spouse name: N/A     Number of children: N/A     Years of education: N/A     Social History  "Main Topics     Smoking status: Never Smoker     Smokeless tobacco: Never Used     Alcohol use No      Comment: very rare     Drug use: No     Sexual activity: Not Currently     Partners: Male     Birth control/ protection: Female Surgical     Other Topics Concern     Caffeine Concern Yes     occ     Sleep Concern Yes     Stress Concern Yes     Special Diet Yes     low carb, low sugar      Exercise No     occ. 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[employer]        Review of Systems:  Skin:  Positive for bruising (left groin area)   Eyes:  Positive for glasses  ENT:  Negative    Respiratory:  Positive for sleep apnea;CPAP  Cardiovascular:  Negative    Gastroenterology: Positive for reflux;nausea;abdominal pain  Genitourinary:  not assessed    Musculoskeletal:  Positive for joint pain;arthritis;back pain  Neurologic:  Positive for stroke;headaches;migraine headaches  Psychiatric:  Negative    Heme/Lymph/Imm:  Positive for allergies  Endocrine:  Positive for thyroid disorder;diabetes    Physical Exam:  Vitals: /74  Pulse 64  Ht 1.562 m (5' 1.5\")  Wt 92.1 kg (203 lb)  BMI 37.74 kg/m2    Constitutional:  cooperative obese      Skin:  warm and dry to the touch          Head:  normocephalic        Eyes:  pupils equal and round        Lymph:      ENT:  no pallor or cyanosis        Neck:  JVP normal;no stiffness        Respiratory:  normal breath sounds, clear to auscultation, normal A-P diameter, normal symmetry, normal respiratory excursion, no use of accessory muscles         Cardiac: regular rhythm;normal S1 and S2   distant heart sounds                right femoral artery;0 right radial artery;3+ right carotid artery;2+   right popliteal artery;2+     left femoral artery;3+ left radial artery;3+ left carotid artery;2+         right femoral bruit (-) left femoral bruit (+) right leg cool \"since polio\"    GI:  abdomen " soft obese      Extremities and Muscular Skeletal:      bilateral LE edema;trace     brace on right ankle, left thigh with extensive ecchymosis into medial/lateral thigh, soft but extensive with +bruit at site.    Neurological:    limb weakness;foot drop right LLE    Psych:  Alert and Oriented x 3        Recent Lab Results:  LIPID RESULTS:  Lab Results   Component Value Date    CHOL 202 (H) 08/01/2017    HDL 35 (L) 08/01/2017    LDL 49 02/15/2017    TRIG 451 (H) 08/01/2017    CHOLHDLRATIO 4.6 02/15/2017    CHOLHDLRATIO 4.6 03/31/2015       LIVER ENZYME RESULTS:  Lab Results   Component Value Date    AST 28 02/12/2018    ALT 29 02/12/2018       CBC RESULTS:  Lab Results   Component Value Date    WBC 8.0 02/23/2018    RBC 3.71 (L) 02/23/2018    HGB 11.2 (L) 02/23/2018    HCT 32.7 (L) 02/23/2018    MCV 88 02/23/2018    MCH 30.2 02/23/2018    MCHC 34.3 02/23/2018    RDW 12.4 02/23/2018     02/23/2018       BMP RESULTS:  Lab Results   Component Value Date     02/26/2018    POTASSIUM 4.8 02/26/2018    CHLORIDE 103 02/26/2018    CO2 29 02/26/2018    ANIONGAP 10.8 02/26/2018     (H) 02/26/2018    BUN 27 02/26/2018    CR 1.29 02/26/2018    GFRESTIMATED 41 (L) 02/26/2018    GFRESTBLACK 49 (L) 02/26/2018    RINKU 9.6 02/26/2018        A1C RESULTS:  Lab Results   Component Value Date    A1C 6.5 (H) 02/12/2018       INR RESULTS:  Lab Results   Component Value Date    INR 0.98 02/23/2018    INR 2.00 (H) 11/18/2013           CC  Shola Benoit MD  [address]  [place] [address]                  Service Date: 02/26/2018      HISTORY OF PRESENT ILLNESS:  Chasity was added on to my schedule more urgently today as she called in to report an extending bruise from her femoral site into the inner aspect of her thigh and along the back of her thigh.  She reported the area being very dark with some discomfort.  Therefore, she is here today for an evaluation.  I met Chasity last week for an evaluation of  an abnormal stress test.  She was set up for a coronary angiogram that was done on 02/23/2018.  This stress test was done as the patient was being worked up for a right shoulder replacement surgery.  The stress test suggested anterior septal ischemia.  She underwent a left femoral access site for her angiogram.  The findings were negative for any significant coronary artery disease, just minimal disease was observed.  A FemoStop was placed in her left groin site; however, she developed a hematoma.  The area was marked by the nursing staff.  When she was discharged [facility], there was no oozing, her groin site was soft; however, the area was .  Ice was applied for pain control.  In review of [facility], however, the patient called the on-call service over the weekend, who told her to give our office a call if the patient felt the bruising area had grown.  The patient is here today for another evaluation.      The patient is diabetic.  She is on metformin.  Her metformin has been held since her coronary angiogram.  A basic metabolic panel was ordered today prior to her resuming her metformin.  Today Chasity reports concern about the amount of bruising.  She reports that the site is tender.  She reports some lightheadedness with position change.  She denies chest pain or chest pressure.      PHYSICAL EXAMINATION:   VITAL SIGNS:  Blood pressure today is 140/74, pulse is 64, weight today is 206.   CARDIAC:  Heart tones are regular with an S1, S2 without an S3, S4.   LUNGS:  Clear without crackles or wheezes.   EXTREMITIES:  Left femoral site positive for bruit.  Area with a quite extended bruised area migrating [facility] down her thigh into the anterior aspect of her thigh, extending out to the lateral aspect of her thigh.  The ecchymotic area is soft.  There are no areas of pulsation or firmness.      IMPRESSION AND PLAN:  This is a 72-year-old patient who is here today in followup for further evaluation of  her left femoral site that was used to perform a left heart catheterization.  Hematoma was noted soon after the procedure.  Since Friday, the area of ecchymosis has extended.  Today, I will order a left femoral ultrasound to rule out a pseudo-aneurysm because of the presence of an auscultated bruit.  I reinforced the patient could use ice for comfort.  I will notify the patient the results of the ultrasound.      Today, I reviewed the results of the coronary angiogram and told the patient she can reschedule her right shoulder replacement surgery.      Patient with a history of atypical chest pain.  The patient denies any problems with her chest pain over the weekend.      It has been my pleasure to see Sammie again.      Imani Louie MS, ANP         MAGALY BRADFORD, CNP             D: 2018   T: 2018   MT: MARY LOU      Name:     SUZANNE BROOKE   MRN:      [id number]        Account:      [id number]   :      1945           Service Date: 2018      Document: [id number]        Thank you for allowing me to participate in the care of your patient.      Sincerely,     MAGALY Bradford CNP     [facility] [place]    cc:   Shola Benoit MD  [address] [place] [id number]

## 2018-02-26 NOTE — MR AVS SNAPSHOT
After Visit Summary   2/26/2018    Chasity Hodgson    MRN: [id number]           Patient Information     Date Of Birth          1945        Visit Information        Provider Department      2/26/2018 1:30 PM Imani Louie APRN CNP [facility]        Today's Diagnoses     Coronary artery disease involving native heart without angina pectoris, unspecified vessel or lesion type    -  1    Femoral bruit        Benign essential hypertension          Care Instructions    Use ice on the tender spots of bruises.          Follow-ups after your visit        Your next 10 appointments already scheduled     Mar 07, 2018 12:00 PM CST   Return Visit with MAGALY Cat CNP   [facility] ([facility])    [address]  [place] [address] [phone]            Mar 14, 2018 12:00 PM CDT   Pre-Op physical with Shola Benoit MD   [facility]    [address] [address] [phone]            Jun 04, 2018 10:50 AM CDT   (Arrive by 10:35 AM)   RETURN HAIRLOSS with Renetta Meyer MD   [facility] ([facility])    [address]  [address] [facility] [address] [phone]              Who to contact     If you have questions or need follow up information about today's clinic visit or your schedule please contact [facility] directly at [phone].  Normal or non-critical lab and imaging results will be communicated to you by MyChart, letter or phone within 4 business days after the clinic has received the results. If you do not hear from us within 7 days, please contact the clinic through MyChart or phone. If you have a critical or abnormal lab result, we will notify you by phone as soon as possible.  Submit refill requests through [place] or  "call your pharmacy and they will forward the refill request to us. Please allow 3 business days for your refill to be completed.          Additional Information About Your Visit        MyChart Information     [facility] gives you secure access to your electronic health record. If you see a primary care provider, you can also send messages to your care team and make appointments. If you have questions, please call your primary care clinic.  If you do not have a primary care provider, please call [phone] and they will assist you.        Care EveryWhere ID     This is your Care EveryWhere ID. This could be used by other organizations to access your [place] medical records  [id number]        Your Vitals Were     Pulse Height BMI (Body Mass Index)             64 1.562 m (5' 1.5\") 37.74 kg/m2          Blood Pressure from Last 3 Encounters:   No data found for BP    Weight from Last 3 Encounters:   No data found for Wt              Today, you had the following     No orders found for display       Primary Care Provider Office Phone # Fax #    Shola Benoit [phone] [address]  [facility] [address]        Equal Access to Services     [facility]: Hadii aad ku hadasho Sosavitaali, waaxda luqadaha, qaybta kaalmada adelamberto, jose baldwin . So [facility] [phone].    ATENCIÓN: Si habla español, tiene a modi disposición servicios gratuitos de asistencia lingüística. Llame al [phone].    We comply with applicable federal civil rights laws and [place] laws. We do not discriminate on the basis of race, color, national origin, age, disability, sex, sexual orientation, or gender identity.            Thank you!     Thank you for choosing [facility]  for your care. Our goal is always to provide you with excellent care. Hearing back from our patients is one way we can continue to improve our services. Please take a few " minutes to complete the written survey that you may receive in the mail after your visit with us. Thank you!             Your Updated Medication List - Protect others around you: Learn how to safely use, store and throw away your medicines at www.disposemymeds.org.          This list is accurate as of 2/26/18 11:59 PM.  Always use your most recent med list.                   Brand Name Dispense Instructions for use Diagnosis    ascorbic acid 1000 MG Tabs    vitamin C     Take 1,000 mg by mouth daily        ASTEPRO NA           atenolol 50 MG tablet    TENORMIN    180 tablet    Take 1 tablet (50 mg) by mouth 2 times daily    Essential hypertension       atorvastatin 20 MG tablet    LIPITOR     Take 20 mg by mouth daily        BIOTIN PO      Take 1,000 mcg by mouth        blood glucose monitoring test strip    no brand specified    1 Box    Use to test blood sugar 1 times daily or as directed.    Type 2 diabetes mellitus with other specified complication (H)       clotrimazole 1 % cream    LOTRIMIN     Apply topically daily        cycloSPORINE 0.05 % ophthalmic emulsion    RESTASIS     1 drop 2 times daily        diphenhydrAMINE-acetaminophen  MG tablet    TYLENOL PM     Take 1 tablet by mouth At Bedtime Reported on 3/20/2017        famotidine 40 MG tablet    PEPCID     Take 40 mg by mouth At Bedtime        fenofibrate 145 MG tablet     90 tablet    Take 1 tablet (145 mg) by mouth daily    CARDIOVASCULAR SCREENING; LDL GOAL LESS THAN 130       ferrous sulfate 325 (65 FE) MG tablet    IRON     Take 325 mg by mouth daily (with breakfast)        fexofenadine 180 MG tablet    ALLEGRA    120    Take 180 mg by mouth daily.        fluticasone 50 MCG/ACT spray    FLONASE    1 Bottle    Spray 2 sprays in nostril daily 2 sprays in each nostril qd    Seasonal allergic rhinitis due to other allergic trigger       gabapentin 100 MG capsule    NEURONTIN    90 capsule    Take 1 capsule (100 mg) by mouth every evening as needed     Poliomyelitis       hydrochlorothiazide 25 MG tablet    HYDRODIURIL    90 tablet    Take 1 tablet (25 mg) by mouth daily    Essential hypertension       lisinopril 20 MG tablet    PRINIVIL/ZESTRIL    90 tablet    Take 1 tablet (20 mg) by mouth daily    Essential hypertension       metFORMIN 500 MG tablet    GLUCOPHAGE     Take 500 mg by mouth daily (with dinner)        metoclopramide 10 MG tablet    REGLAN     Take 1-2 tabs as needed        multivitamin per tablet     100    ONE DAILY        * nitroGLYcerin 0.4 MG sublingual tablet    NITROSTAT    25 tablet    Place 1 tablet (0.4 mg) under the tongue every 5 minutes as needed for chest pain (no more than 3 in one hour; after 3rd, call 911.)    Atypical chest pain       * nitroGLYcerin 0.3 MG/HR 24 hr patch    NITRODUR    30 patch    Place 1 patch onto the skin daily    Coronary artery disease involving native heart without angina pectoris, unspecified vessel or lesion type, Insufficiency, arterial, peripheral (H)       nystatin cream    MYCOSTATIN     Apply topically daily as needed        nystatin-triamcinolone cream    MYCOLOG II     Apply topically daily as needed        ondansetron 4 MG tablet    ZOFRAN     Take 1 tablet by mouth every 6 hours as needed Reported on 3/20/2017        * order for DME     1 Units    Equipment being ordered: Oral appliance for sleep apnea    ANNETTE (obstructive sleep apnea)       * order for DME     1 each    Donut Pillow    Coccydynia       * order for DME     2 each    Equipment being ordered: Compression stockings - Knee High; 20-30 mmHg compression    Insufficiency, arterial, peripheral (H)       pantoprazole 40 MG EC tablet    PROTONIX     Take 40 mg by mouth 2 times daily        SB LOW DOSE ASA EC 81 MG EC tablet   Generic drug:  aspirin      Take 81 mg by mouth daily        sitagliptin 50 MG tablet    JANUVIA     Take 50 mg by mouth daily        sucralfate 1 GM/10ML suspension    CARAFATE     Take 1 g by mouth 2 times daily as  needed        SYNTHROID 112 MCG tablet   Generic drug:  levothyroxine      Take 112 mcg by mouth daily Take 112 mcg daily except for Friday takes an additional 56 mcg.  Brand name Synthroid        TOUJEO SOLOSTAR 300 UNIT/ML injection   Generic drug:  insulin glargine U-300      Inject 30 Units Subcutaneous every morning Took 14 units this am        triamcinolone 0.1 % cream    KENALOG     Apply topically daily        VITAMIN D-3 PO      Take 2 tablets by mouth        * Notice:  This list has 5 medication(s) that are the same as other medications prescribed for you. Read the directions carefully, and ask your doctor or other care provider to review them with you.

## 2018-02-26 NOTE — TELEPHONE ENCOUNTER
Patient called to report that she beliewves her groin bruise post angiogram has grown since speaking with the on call cardiologist on the weekend.   Patient states she has a dark Purple bruise that extends from her hip to about 2 inches above the knee. No discoloration just purple. Patient states some disomfort at the groin site and down her leg. Patient prefers to be seen in the office and also have a post angiogram BMP check for her metformin?. CHRIS OV offered for early afternoon.    Patient agrees with plan.

## 2018-02-27 ENCOUNTER — TRANSFERRED RECORDS (OUTPATIENT)
Dept: HEALTH INFORMATION MANAGEMENT | Facility: CLINIC | Age: 73
End: 2018-02-27

## 2018-02-27 ENCOUNTER — CARE COORDINATION (OUTPATIENT)
Dept: CARDIOLOGY | Facility: CLINIC | Age: 73
End: 2018-02-27

## 2018-02-27 ENCOUNTER — TELEPHONE (OUTPATIENT)
Dept: FAMILY MEDICINE | Facility: CLINIC | Age: 73
End: 2018-02-27

## 2018-02-27 DIAGNOSIS — R42 DIZZINESS: Primary | ICD-10-CM

## 2018-02-27 NOTE — TELEPHONE ENCOUNTER
"Pt reports sx - semi vague, hard to pin down.  Denies current heart palpatations or heart issues, but reports a shortness of breath where pt just feels shes breathing more often or heavier since Angiogram.  Pt noticed Sat or Sunday, but went away, is back today.  O2 sat and BP were normal at allergist today per pt.  \"It feels different than anything before.  I can breathe but it feel different, because it's something I haven't had before. I feel like I'm breathing more often.\"  Pt also reports leg is more swollen today than yesterday, had also called oncall on this over weekend.  As seen by Cardiologist, advised pt to call Cards and check with them first if able to reach nurse/Imani Louie , otherwise with these sx should go to ER for evaluation.    Darya Zamarripa RN    "

## 2018-02-27 NOTE — PROGRESS NOTES
Imani Louie, MAGALY CNP   You 12 minutes ago (3:04 PM)              She is anxious...have her keep her follow-up appt with me.  We can check a hemoglobin then, with her large hematoma, I expect some drop in hemoglobin. But the US demonstrated no active blood flow.     Called pt with recommendations from WILLIAM Calero.  Pt states understanding and states she will complete a hgb lab draw at her PMD's office tomorrow as she has an OV with him.

## 2018-02-27 NOTE — TELEPHONE ENCOUNTER
"Spoke with patient:   Shoulder surgery was scheduled on Thursday 2/22/18 but day before anesthesiologist at [facility] told pt had abnormal stress test so needed angiogram before surgery  Had cardiac angiogram 2/23/18 - afterwards had bleeding under skin from hip area down inner thigh to knee. Discharged on Friday and was doing well then, heart was doing well, no blockage.   In hospital needed to apply extra pressure on leg due to bleeding. All black and blue around leg - 7 hours of pressure applied   Went home and felt fatigued. Saturday lots of leg pain. Monday went to work - noticed some SOB, dizziness. Can feel heart beating faster (racing)  When takes deep breath is okay but in-between feels SOB   At night lays down. Last night felt like maybe retaining fluid in arms/legs (swelling), hands weren't real puffy but skin feels tight   Worsened as day went on  Yesterday went to heart clinic - felt dizzy and mentioned this - did ultrasound of leg to check if there was a \"false aneurism due to bleeding\" - BMP was checked   Takes baby aspirin daily, not on blood thinners     Advised urgent visit to ER for symptoms. Patient verbalized understanding/agreement.     Floresita ROSE RN        "

## 2018-02-27 NOTE — TELEPHONE ENCOUNTER
Since Angiogram last Friday 2/23/2018 pt has been experiencing shortness of breath and heart palpatations.  Pt's ph# [phone]

## 2018-02-27 NOTE — PROGRESS NOTES
"Pt calls today with concerns for symptoms after her angiogram which was completed on 2/23/18.    Pt states she had an OV with WILLIAM Calero yesterday (2/26/18) regarding post angio hematoma.  Pt states after that visit she began to experience SOB at times, episodes of heart racing which last approximately 30 minutes and episodes of lightheadedness, all of which she has experienced today and yesterday afternoon.  Pt has a BP cuff at home however has not checked her BP and HR at times when symptoms are present.  Encouraged patient to check BP and HR at home.  Pt states understanding.  Pt states she is concerned about her hemoglobin also.  CBC done 2/23/18.  Reviewed LLE US results with patient.      Per OV note from William Calero (2/26/18), \"This is a 72-year-old patient who is here today in followup for further evaluation of her left femoral site that was used to perform a left heart catheterization.  Hematoma was noted soon after the procedure.  Since Friday, the area of ecchymosis has extended.  Today, I will order a left femoral ultrasound to rule out a pseudo-aneurysm because of the presence of an auscultated bruit.  I reinforced the patient could use ice for comfort.  I will notify the patient the results of the ultrasound.\"    US completed 2/26/18:  Left lower extremity arterial duplex 2/26/2018 4:00 PM    Impression:   No pseudoaneurysm or fistula. 8.6 x 4.4 x 1.2 cm hematoma in the left  groin at the area of bruising without active blood flow.       CBC done 2/23/18:  2/23/18  6:25 AM [id number]    Component Results   Component Value Flag Ref Range Units Status Collected Lab   WBC 8.0  4.0 - 11.0 10e9/L Final 02/23/2018  6:25 AM [facility]   RBC Count 3.71 (L) 3.8 - 5.2 10e12/L Final 02/23/2018  6:25 AM [facility]   Hemoglobin 11.2 (L) 11.7 - 15.7 g/dL Final 02/23/2018  6:25 AM [facility]   Hematocrit 32.7 (L) 35.0 - 47.0 % Final 02/23/2018  6:25 AM [facility]   MCV 88  78 - 100 fl Final 02/23/2018  6:25 AM [facility] 30.2 "  26.5 - 33.0 pg Final 02/23/2018  6:25 AM Fr[facility]HsLb   MCHC 34.3  31.5 - 36.5 g/dL Final 02/23/2018  6:25 AM [facility]   RDW 12.4  10.0 - 15.0 % Final 02/23/2018  6:25 AM [facility]   Platelet Count 233  150 - 450 10e9/L Final 02/23/2018  6:25 AM [facility]       Will route to WILLIAM Calero for review.

## 2018-02-27 NOTE — PROGRESS NOTES
Service Date: 02/26/2018      HISTORY OF PRESENT ILLNESS:  Chasity was added on to my schedule more urgently today as she called in to report an extending bruise from her femoral site into the inner aspect of her thigh and along the back of her thigh.  She reported the area being very dark with some discomfort.  Therefore, she is here today for an evaluation.  I met Chasity last week for an evaluation of an abnormal stress test.  She was set up for a coronary angiogram that was done on 02/23/2018.  This stress test was done as the patient was being worked up for a right shoulder replacement surgery.  The stress test suggested anterior septal ischemia.  She underwent a left femoral access site for her angiogram.  The findings were negative for any significant coronary artery disease, just minimal disease was observed.  A FemoStop was placed in her left groin site; however, she developed a hematoma.  The area was marked by the nursing staff.  When she was discharged [facility], there was no oozing, her groin site was soft; however, the area was .  Ice was applied for pain control.  In review of [facility], however, the patient called the on-call service over the weekend, who told her to give our office a call if the patient felt the bruising area had grown.  The patient is here today for another evaluation.      The patient is diabetic.  She is on metformin.  Her metformin has been held since her coronary angiogram.  A basic metabolic panel was ordered today prior to her resuming her metformin.  Today Chasity reports concern about the amount of bruising.  She reports that the site is tender.  She reports some lightheadedness with position change.  She denies chest pain or chest pressure.      PHYSICAL EXAMINATION:   VITAL SIGNS:  Blood pressure today is 140/74, pulse is 64, weight today is 206.   CARDIAC:  Heart tones are regular with an S1, S2 without an S3, S4.   LUNGS:  Clear without crackles or wheezes.    EXTREMITIES:  Left femoral site positive for bruit.  Area with a quite extended bruised area migrating senior care down her thigh into the anterior aspect of her thigh, extending out to the lateral aspect of her thigh.  The ecchymotic area is soft.  There are no areas of pulsation or firmness.      IMPRESSION AND PLAN:  This is a 72-year-old patient who is here today in followup for further evaluation of her left femoral site that was used to perform a left heart catheterization.  Hematoma was noted soon after the procedure.  Since Friday, the area of ecchymosis has extended.  Today, I will order a left femoral ultrasound to rule out a pseudo-aneurysm because of the presence of an auscultated bruit.  I reinforced the patient could use ice for comfort.  I will notify the patient the results of the ultrasound.      Today, I reviewed the results of the coronary angiogram and told the patient she can reschedule her right shoulder replacement surgery.      Patient with a history of atypical chest pain.  The patient denies any problems with her chest pain over the weekend.      It has been my pleasure to see Sammie again.      Imani Louie MS, ANP         MAGALY SINGH, CNP             D: 2018   T: 2018   MT: MARY LOU      Name:     SUZANNE BROOKE   MRN:      [id number]        Account:      [id number]   :      1945           Service Date: 2018      Document: [id number]

## 2018-02-27 NOTE — TELEPHONE ENCOUNTER
She is anxious...have her keep her follow-up appt with me.  We can check a hemoglobin then, with her large hematoma, I expect some drop in hemoglobin. But the US demonstrated no active blood flow.

## 2018-02-28 ENCOUNTER — OFFICE VISIT (OUTPATIENT)
Dept: FAMILY MEDICINE | Facility: CLINIC | Age: 73
End: 2018-02-28
Payer: MEDICARE

## 2018-02-28 VITALS
WEIGHT: 203 LBS | BODY MASS INDEX: 37.36 KG/M2 | OXYGEN SATURATION: 98 % | SYSTOLIC BLOOD PRESSURE: 139 MMHG | TEMPERATURE: 98.2 F | HEART RATE: 63 BPM | HEIGHT: 62 IN | DIASTOLIC BLOOD PRESSURE: 63 MMHG

## 2018-02-28 DIAGNOSIS — S70.12XA CONTUSION OF LEFT THIGH, INITIAL ENCOUNTER: ICD-10-CM

## 2018-02-28 DIAGNOSIS — D62 ANEMIA DUE TO BLOOD LOSS, ACUTE: Primary | ICD-10-CM

## 2018-02-28 PROBLEM — I25.10 CORONARY ARTERY DISEASE INVOLVING NATIVE HEART WITHOUT ANGINA PECTORIS, UNSPECIFIED VESSEL OR LESION TYPE: Status: RESOLVED | Noted: 2018-02-12 | Resolved: 2018-02-28

## 2018-02-28 LAB
ERYTHROCYTE [DISTWIDTH] IN BLOOD BY AUTOMATED COUNT: 12.7 % (ref 10–15)
HCT VFR BLD AUTO: 30.6 % (ref 35–47)
HGB BLD-MCNC: 10.1 G/DL (ref 11.7–15.7)
MCH RBC QN AUTO: 30.4 PG (ref 26.5–33)
MCHC RBC AUTO-ENTMCNC: 33 G/DL (ref 31.5–36.5)
MCV RBC AUTO: 92 FL (ref 78–100)
PLATELET # BLD AUTO: 245 10E9/L (ref 150–450)
RBC # BLD AUTO: 3.32 10E12/L (ref 3.8–5.2)
WBC # BLD AUTO: 8.8 10E9/L (ref 4–11)

## 2018-02-28 PROCEDURE — 85027 COMPLETE CBC AUTOMATED: CPT | Performed by: INTERNAL MEDICINE

## 2018-02-28 PROCEDURE — 99213 OFFICE O/P EST LOW 20 MIN: CPT | Performed by: INTERNAL MEDICINE

## 2018-02-28 PROCEDURE — 36415 COLL VENOUS BLD VENIPUNCTURE: CPT | Performed by: INTERNAL MEDICINE

## 2018-02-28 NOTE — PROGRESS NOTES
"[facility]  CLINIC PROGRESS NOTE    Subjective:  Bruising   Has had significant bruising in her left leg following her recent anigogram.  She has continued on aspirin 81 mg daily.   She had anigogram on Friday.   Anemia   She conitnues to have symptoms of dizziness and some concern for worsening anemia.     Past medical history, medications, allergies, social history, family history reviewed and updated in EPIC as of 2/28/2018 .    ROS  CONSTITUTIONAL: no fatigue, no unexpected change in weight  SKIN: bruising as above   EYES: no acute vision problems or changes  ENT: no ear problems, no mouth problems, no throat problems  RESP: no significant cough, no shortness of breath  CV: occasional palpitations, no new or worsening peripheral edema  GI: no nausea, no vomiting, no constipation, no diarrhea  : no frequency, no dysuria, no hematuria  MS: slight pain in the left groin  PSYCHIATRIC: no changes in mood or affect      Objective:  Vitals  /63  Pulse 63  Temp 98.2  F (36.8  C) (Oral)  Ht 5' 1.5\" (1.562 m)  Wt 203 lb (92.1 kg)  SpO2 98%  Breastfeeding? No  BMI 37.74 kg/m2  GEN: Alert Oriented x3 NAD  HEENT: Atraumatic, normocephalic, neck supple   CV: RRR no murmurs or rubs  PULM: CTA no wheezes or crackles  SKIN: bruising along medial aspect of left thigh down to knee  EXT: trace edema bilateral lower extremities  NEURO: Gait and station deferred, No focal neurologic deficits  PSYCH: Mood good, affect mood congruent    No images are attached to the encounter.    Results for orders placed or performed in visit on 02/28/18 (from the past 24 hour(s))   CBC with platelets   Result Value Ref Range    WBC 8.8 4.0 - 11.0 10e9/L    RBC Count 3.32 (L) 3.8 - 5.2 10e12/L    Hemoglobin 10.1 (L) 11.7 - 15.7 g/dL    Hematocrit 30.6 (L) 35.0 - 47.0 %    MCV 92 78 - 100 fl    MCH 30.4 26.5 - 33.0 pg    MCHC 33.0 31.5 - 36.5 g/dL    RDW 12.7 10.0 - 15.0 %    Platelet Count 245 150 - 450 10e9/L "       Assessment/Plan:  Patient Instructions   (D62) Anemia due to blood loss, acute  (primary encounter diagnosis)  Comment: We will plan to check hemoglobin today and follow up in gastroenterology to discuss workup as may need workup prior to your upcoming elective surgery.  Plan:     (S70.12XA) Contusion of left thigh, initial encounter  Comment: Notable bruising following your angiogram.  I would advise you to hold aspirin for the next three days.  Plan:        Follow up in gastroenterology     15 minutes spent with patient.  Over 50% of time counseling      Disclaimer: This note consists of symbols derived from keyboarding, dictation and/or voice recognition software. As a result, there may be errors in the script that have gone undetected. Please consider this when interpreting information found in this chart.    Shola Benoit MD  [phone]

## 2018-02-28 NOTE — NURSING NOTE
"Chief Complaint   Patient presents with     Bleeding/Bruising       Initial /63  Pulse 63  Temp 98.2  F (36.8  C) (Oral)  Ht 5' 1.5\" (1.562 m)  Wt 203 lb (92.1 kg)  SpO2 98%  Breastfeeding? No  BMI 37.74 kg/m2 Estimated body mass index is 37.74 kg/(m^2) as calculated from the following:    Height as of this encounter: 5' 1.5\" (1.562 m).    Weight as of this encounter: 203 lb (92.1 kg).  Medication Reconciliation: complete   Ricarda Velasco, LESVIA      "

## 2018-02-28 NOTE — PATIENT INSTRUCTIONS
(D62) Anemia due to blood loss, acute  (primary encounter diagnosis)  Comment: We will plan to check hemoglobin today and follow up in gastroenterology to discuss workup as may need workup prior to your upcoming elective surgery.  Plan:     (S70.12XA) Contusion of left thigh, initial encounter  Comment: Notable bruising following your angiogram.  I would advise you to hold aspirin for the next three days.  Plan:

## 2018-02-28 NOTE — MR AVS SNAPSHOT
After Visit Summary   2/28/2018    Chasity Hodgson    MRN: [id number]           Patient Information     Date Of Birth          1945        Visit Information        Provider Department      2/28/2018 12:30 PM Shola Benoit MD [facility]        Today's Diagnoses     Anemia due to blood loss, acute    -  1    Contusion of left thigh, initial encounter          Care Instructions    (D62) Anemia due to blood loss, acute  (primary encounter diagnosis)  Comment: We will plan to check hemoglobin today and follow up in gastroenterology to discuss workup as may need workup prior to your upcoming elective surgery.  Plan:     (S70.12XA) Contusion of left thigh, initial encounter  Comment: Notable bruising following your angiogram.  I would advise you to hold aspirin for the next three days.  Plan:              Follow-ups after your visit        Your next 10 appointments already scheduled     Mar 06, 2018  1:00 PM CST   Return Visit with MAGALY Cat CNP   [facility] ([facility])    [address] [place], [place] [id number]  [facility] [id number] [phone]            Mar 12, 2018 10:30 AM CDT   (Arrive by 10:15 AM)   RETURN HAIRLOSS with Renetta Meyer MD   [facility] ([facility])    [address]  [address] [facility] [address] [phone]              Future tests that were ordered for you today     Open Future Orders        Priority Expected Expires Ordered    Hemoglobin Routine 3/6/2018 2/27/2019 2/27/2018            Who to contact     If you have questions or need follow up information about today's clinic visit or your schedule please contact [facility] directly at [phone].  Normal or non-critical lab and imaging results will be communicated to you by MyChart, letter or phone within 4 business days after the clinic has received the results. If you do not  "hear from us within 7 days, please contact the clinic through [employer] or phone. If you have a critical or abnormal lab result, we will notify you by phone as soon as possible.  Submit refill requests through [employer] or call your pharmacy and they will forward the refill request to us. Please allow 3 business days for your refill to be completed.          Additional Information About Your Visit        [employer] Information     [employer] gives you secure access to your electronic health record. If you see a primary care provider, you can also send messages to your care team and make appointments. If you have questions, please call your primary care clinic.  If you do not have a primary care provider, please call [phone] and they will assist you.        Care EveryWhere ID     This is your Care EveryWhere ID. This could be used by other organizations to access your [facility] medical records  [id number]        Your Vitals Were     Pulse Temperature Height Pulse Oximetry Breastfeeding? BMI (Body Mass Index)    63 98.2  F (36.8  C) (Oral) 5' 1.5\" (1.562 m) 98% No 37.74 kg/m2       Blood Pressure from Last 3 Encounters:   02/28/18 139/63   02/26/18 140/74   02/23/18 152/61    Weight from Last 3 Encounters:   02/28/18 203 lb (92.1 kg)   02/26/18 203 lb (92.1 kg)   02/23/18 204 lb 5 oz (92.7 kg)              Today, you had the following     No orders found for display       Primary Care Provider Office Phone # Fax #    Shola Benoit [phone] [address]  [facility] [address]        Equal Access to Services     [facility]: Hadii domi Olea, nicole izaguirre, qajose rasheed. So [facility] [phone].    ATENCIÓN: Si habla español, tiene a modi disposición servicios gratuitos de asistencia lingüística. Romaine al [phone].    We comply with applicable federal civil rights laws and [place] laws. We do not discriminate " on the basis of race, color, national origin, age, disability, sex, sexual orientation, or gender identity.            Thank you!     Thank you for choosing [facility]  for your care. Our goal is always to provide you with excellent care. Hearing back from our patients is one way we can continue to improve our services. Please take a few minutes to complete the written survey that you may receive in the mail after your visit with us. Thank you!             Your Updated Medication List - Protect others around you: Learn how to safely use, store and throw away your medicines at www.disposemymeds.org.          This list is accurate as of 2/28/18  1:02 PM.  Always use your most recent med list.                   Brand Name Dispense Instructions for use Diagnosis    ascorbic acid 1000 MG Tabs    vitamin C     Take 1,000 mg by mouth daily        ASTEPRO NA           atenolol 50 MG tablet    TENORMIN    180 tablet    Take 1 tablet (50 mg) by mouth 2 times daily    Essential hypertension       atorvastatin 20 MG tablet    LIPITOR     Take 20 mg by mouth daily        BIOTIN PO      Take 1,000 mcg by mouth        blood glucose monitoring test strip    no brand specified    1 Box    Use to test blood sugar 1 times daily or as directed.    Type 2 diabetes mellitus with other specified complication (H)       clotrimazole 1 % cream    LOTRIMIN     Apply topically daily        cycloSPORINE 0.05 % ophthalmic emulsion    RESTASIS     1 drop 2 times daily        diphenhydrAMINE-acetaminophen  MG tablet    TYLENOL PM     Take 1 tablet by mouth At Bedtime Reported on 3/20/2017        famotidine 40 MG tablet    PEPCID     Take 40 mg by mouth At Bedtime        fenofibrate 145 MG tablet     90 tablet    Take 1 tablet (145 mg) by mouth daily    CARDIOVASCULAR SCREENING; LDL GOAL LESS THAN 130       ferrous sulfate 325 (65 FE) MG tablet    IRON     Take 325 mg by mouth daily (with breakfast)        fexofenadine 180 MG  tablet    ALLEGRA    120    Take 180 mg by mouth daily.        fluticasone 50 MCG/ACT spray    FLONASE    1 Bottle    Spray 2 sprays in nostril daily 2 sprays in each nostril qd    Seasonal allergic rhinitis due to other allergic trigger       gabapentin 100 MG capsule    NEURONTIN    90 capsule    Take 1 capsule (100 mg) by mouth every evening as needed    Poliomyelitis       hydrochlorothiazide 25 MG tablet    HYDRODIURIL    90 tablet    Take 1 tablet (25 mg) by mouth daily    Essential hypertension       lisinopril 20 MG tablet    PRINIVIL/ZESTRIL    90 tablet    Take 1 tablet (20 mg) by mouth daily    Essential hypertension       metFORMIN 500 MG tablet    GLUCOPHAGE     Take 500 mg by mouth daily (with dinner)        metoclopramide 10 MG tablet    REGLAN     Take 1-2 tabs as needed        multivitamin per tablet     100    ONE DAILY        * nitroGLYcerin 0.4 MG sublingual tablet    NITROSTAT    25 tablet    Place 1 tablet (0.4 mg) under the tongue every 5 minutes as needed for chest pain (no more than 3 in one hour; after 3rd, call 911.)    Atypical chest pain       * nitroGLYcerin 0.3 MG/HR 24 hr patch    NITRODUR    30 patch    Place 1 patch onto the skin daily    Coronary artery disease involving native heart without angina pectoris, unspecified vessel or lesion type, Insufficiency, arterial, peripheral (H)       nystatin cream    MYCOSTATIN     Apply topically daily as needed        nystatin-triamcinolone cream    MYCOLOG II     Apply topically daily as needed        ondansetron 4 MG tablet    ZOFRAN     Take 1 tablet by mouth every 6 hours as needed Reported on 3/20/2017        * order for DME     1 Units    Equipment being ordered: Oral appliance for sleep apnea    ANNETTE (obstructive sleep apnea)       * order for DME     1 each    Donut Pillow    Coccydynia       * order for DME     2 each    Equipment being ordered: Compression stockings - Knee High; 20-30 mmHg compression    Insufficiency, arterial,  peripheral (H)       pantoprazole 40 MG EC tablet    PROTONIX     Take 40 mg by mouth 2 times daily        SB LOW DOSE ASA EC 81 MG EC tablet   Generic drug:  aspirin      Take 81 mg by mouth daily        sitagliptin 50 MG tablet    JANUVIA     Take 50 mg by mouth daily        sucralfate 1 GM/10ML suspension    CARAFATE     Take 1 g by mouth 2 times daily as needed        SYNTHROID 112 MCG tablet   Generic drug:  levothyroxine      Take 112 mcg by mouth daily Take 112 mcg daily except for Friday takes an additional 56 mcg.  Brand name Synthroid        TOUJEO SOLOSTAR 300 UNIT/ML injection   Generic drug:  insulin glargine U-300      Inject 30 Units Subcutaneous every morning Took 14 units this am        triamcinolone 0.1 % cream    KENALOG     Apply topically daily        VITAMIN D-3 PO      Take 2 tablets by mouth        * Notice:  This list has 5 medication(s) that are the same as other medications prescribed for you. Read the directions carefully, and ask your doctor or other care provider to review them with you.

## 2018-03-01 NOTE — PROGRESS NOTES
Gerard Gaspar,    I have had the opportunity to review your recent results and an interpretation is as follows:  Your complete blood counts show a decline in your hemoglobin - I would recommend follow up closely in gastroenterology and we should recheck this again in 1-2 weeks.     Sincerely,  Shola Benoit MD

## 2018-03-07 ENCOUNTER — OFFICE VISIT (OUTPATIENT)
Dept: CARDIOLOGY | Facility: CLINIC | Age: 73
End: 2018-03-07
Attending: INTERNAL MEDICINE
Payer: MEDICARE

## 2018-03-07 VITALS
WEIGHT: 204.4 LBS | DIASTOLIC BLOOD PRESSURE: 56 MMHG | BODY MASS INDEX: 37.61 KG/M2 | HEART RATE: 65 BPM | HEIGHT: 62 IN | SYSTOLIC BLOOD PRESSURE: 124 MMHG

## 2018-03-07 DIAGNOSIS — I25.10 CORONARY ARTERY DISEASE INVOLVING NATIVE CORONARY ARTERY OF NATIVE HEART WITHOUT ANGINA PECTORIS: ICD-10-CM

## 2018-03-07 PROCEDURE — 99214 OFFICE O/P EST MOD 30 MIN: CPT | Performed by: NURSE PRACTITIONER

## 2018-03-07 NOTE — LETTER
3/7/2018    Shola Benoit MD, MD  [address]  [facility] [address]    RE: Chasity Hodgson       Dear Colleague,    I had the pleasure of seeing Chasity Hodgson in the [facility].    HPI and Plan:   See dictation    No orders of the defined types were placed in this encounter.    No orders of the defined types were placed in this encounter.    Medications Discontinued During This Encounter   Medication Reason     nitroGLYcerin (NITRODUR) 0.3 MG/HR 24 hr patch Discontinued by another Health Care Provider     sucralfate (CARAFATE) 1 GM/10ML suspension Stopped by Patient         Encounter Diagnosis   Name Primary?     Coronary artery disease involving native coronary artery of native heart without angina pectoris        CURRENT MEDICATIONS:  Current Outpatient Prescriptions   Medication Sig Dispense Refill     atorvastatin (LIPITOR) 20 MG tablet Take 20 mg by mouth daily       ferrous sulfate (IRON) 325 (65 FE) MG tablet Take 325 mg by mouth daily (with breakfast)       atenolol (TENORMIN) 50 MG tablet Take 1 tablet (50 mg) by mouth 2 times daily 180 tablet 3     gabapentin (NEURONTIN) 100 MG capsule Take 1 capsule (100 mg) by mouth every evening as needed 90 capsule 3     metFORMIN (GLUCOPHAGE) 500 MG tablet Take 500 mg by mouth daily (with dinner)        hydrochlorothiazide (HYDRODIURIL) 25 MG tablet Take 1 tablet (25 mg) by mouth daily 90 tablet 3     pantoprazole (PROTONIX) 40 MG EC tablet Take 40 mg by mouth 2 times daily       insulin glargine U-300 (TOUJEO SOLOSTAR) 300 UNIT/ML injection Inject 30 Units Subcutaneous every morning Took 14 units this am       famotidine (PEPCID) 40 MG tablet Take 40 mg by mouth At Bedtime       Azelastine HCl (ASTEPRO NA)        triamcinolone (KENALOG) 0.1 % cream Apply topically daily       clotrimazole (LOTRIMIN) 1 % cream Apply topically daily       nitroGLYcerin (NITROSTAT) 0.4 MG sublingual tablet Place 1 tablet (0.4 mg) under the  tongue every 5 minutes as needed for chest pain (no more than 3 in one hour; after 3rd, call 911.) 25 tablet 3     lisinopril (PRINIVIL/ZESTRIL) 20 MG tablet Take 1 tablet (20 mg) by mouth daily 90 tablet 2     fenofibrate 145 MG tablet Take 1 tablet (145 mg) by mouth daily 90 tablet 2     order for DME Equipment being ordered: Compression stockings - Knee High; 20-30 mmHg compression 2 each 0     order for DME Donut Pillow 1 each 0     metoclopramide (REGLAN) 10 MG tablet Take 1-2 tabs as needed       fluticasone (FLONASE) 50 MCG/ACT spray Spray 2 sprays in nostril daily 2 sprays in each nostril qd 1 Bottle 0     order for DME Equipment being ordered: Oral appliance for sleep apnea 1 Units 0     cycloSPORINE (RESTASIS) 0.05 % ophthalmic emulsion 1 drop 2 times daily       sitagliptin (JANUVIA) 50 MG tablet Take 50 mg by mouth daily       Cholecalciferol (VITAMIN D-3 PO) Take 2 tablets by mouth       BIOTIN PO Take 1,000 mcg by mouth       blood glucose monitoring (NO BRAND SPECIFIED) test strip Use to test blood sugar 1 times daily or as directed. 1 Box 6     nystatin-triamcinolone (MYCOLOG II) cream Apply topically daily as needed       ascorbic acid (VITAMIN C) 1000 MG TABS Take 1,000 mg by mouth daily       ondansetron (ZOFRAN) 4 MG tablet Take 1 tablet by mouth every 6 hours as needed Reported on 3/20/2017       aspirin (SB LOW DOSE ASA EC) 81 MG EC tablet Take 81 mg by mouth daily       nystatin (MYCOSTATIN) cream Apply topically daily as needed        levothyroxine (SYNTHROID) 112 MCG tablet Take 112 mcg by mouth daily Take 112 mcg daily except for Friday takes an additional 56 mcg.  Brand name Synthroid       diphenhydrAMINE-acetaminophen (TYLENOL PM)  MG tablet Take 1 tablet by mouth At Bedtime Reported on 3/20/2017       fexofenadine (ALLEGRA) 180 MG tablet Take 180 mg by mouth daily. 120 0     MULTIVITAMINS OR TABS ONE DAILY 100 3     [DISCONTINUED] nitroGLYcerin (NITRODUR) 0.3 MG/HR 24 hr patch  Place 1 patch onto the skin daily 30 patch 1       ALLERGIES     Allergies   Allergen Reactions     Nsaids Difficulty breathing     Increased creatinine     Toradol Difficulty breathing     Shortness of breath     Celecoxib Itching and Rash     Codeine Itching     With higher doses     Crestor [Rosuvastatin] Muscle Pain (Myalgia)     No Clinical Screening - See Comments Itching     Fragrance     Vioxx Other (See Comments)     Heart races     Conjugated Estrogens Rash     Sulfa Drugs Rash       PAST MEDICAL HISTORY:  Past Medical History:   Diagnosis Date     Abdominal adhesions 1984, 96,99    s/p lysis     Allergic rhinitis, cause unspecified      Carotid stenosis      CPAP (continuous positive airway pressure) dependence      Diabetic gastroparesis (H)      Diet-controlled type 2 diabetes mellitus (H)      DVT of axillary vein, acute right (H)      Fibromyalgia      Gastro-oesophageal reflux disease      Hernia of unspecified site of abdominal cavity without mention of obstruction or gangrene     bilateral     HTN (hypertension)      Hypertriglyceridemia      Obstructive sleep apnea      ANNETTE (obstructive sleep apnea)      Papillary carcinoma of thyroid (H)     s/p thyroidectomy - Ruegemer     PE (pulmonary embolism)      Poliomyelitis     poor circulation right leg     Postsurgical hypothyroidism     s/p papillary thryoid cancer - Ruegemer     Pulmonary embolism (H)      Rosacea      S/P carpal tunnel release     bilateral     S/P hardware removal 01/2014    still with lingering foot pain     S/P shoulder surgery     bilateral     Septic joint (H)     right knee     Venous insufficiency      Venous thrombosis 1999    right axillary vein       PAST SURGICAL HISTORY:  Past Surgical History:   Procedure Laterality Date     AMPUTATE TOE(S)  3/15/2012    Procedure:AMPUTATE TOE(S); Surgeon:RUBEN MOSES; Location:[facility] OR     APPENDECTOMY  1972     ARTHRODESIS FOOT  3/15/2012    Procedure:ARTHRODESIS FOOT; RIGHT  TRIPLE ARTHRODESIS, FIFTH TOE AMPUTATION, LATERAL LIGAMENT RECONSTRUCTION, TENDON TRANSFER AND RELEASE [MINI C-ARM, ARTHREX 4.5 AND 6.7 STAPLES, BIOCOMPOSITE TENODESIS SCREWS]; Surgeon:SUKHDEEP METZ; Location: OR     C FREEING BOWEL ADHESION,ENTEROLYSIS      1986, 1996, 1999     C NONSPECIFIC PROCEDURE      throidectomy     C TOTAL ABDOM HYSTERECTOMY  1980    + BSO     CHOLECYSTECTOMY       COLOSTOMY  2/7/2012    Procedure:COLOSTOMY; CREATION OF SIGMOID COLOSTOMY AND EXTENSIVE  LYSIS OF ADHESIONS; Surgeon:MONTSERRAT BENDER; Location: OR     GI SURGERY      weakened rectal sphincter with artificial stimulator     LAPAROTOMY, LYSIS ADHESIONS, COMBINED  2/7/2012    Procedure:COMBINED LAPAROTOMY, LYSIS ADHESIONS; Surgeon:MONTSERRAT BENDER; Location: OR     RELEASE TENDON FOOT  3/15/2012    Procedure:RELEASE TENDON FOOT; Surgeon:SUKHDEEP METZ; Location: OR     REMOVE HARDWARE FOOT  12/13/2012    Procedure: REMOVE HARDWARE FOOT;  RIGHT FOOT REMOVAL OF HARDWARE;  Surgeon: Sukhdeep Metz MD;  Location:  OR       FAMILY HISTORY:  Family History   Problem Relation Age of Onset     Arthritis Mother      Hypertension Mother      CEREBROVASCULAR DISEASE Mother      Obesity Mother      HEART DISEASE Mother      MI's     Hypertension Father      Respiratory Father      Adult RDS     DIABETES Father      adult     Arthritis Sister      CANCER Sister      Arthritis Sister      Hypertension Sister      CANCER Sister      colon polup     HEART DISEASE Brother      MI at 54     Hypertension Sister      Lipids Brother      Hypertension Brother      Lipids Sister      Obesity Sister      Obesity Maternal Grandmother      Skin Cancer Maternal Grandmother      skin cancer unknown     CANCER Maternal Grandmother      unknown skin cancer on face       SOCIAL HISTORY:  Social History     Social History     Marital status:      Spouse name: N/A     Number of children: N/A     Years of  "education: N/A     Social History Main Topics     Smoking status: Never Smoker     Smokeless tobacco: Never Used     Alcohol use No      Comment: very rare     Drug use: No     Sexual activity: Not Currently     Partners: Male     Birth control/ protection: Female Surgical     Other Topics Concern     Caffeine Concern Yes     occ     Sleep Concern Yes     Stress Concern Yes     Special Diet Yes     low carb, low sugar      Exercise No     occ. 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[employer]        Review of Systems:  Skin:  Negative     Eyes:  Positive for glasses  ENT:  Negative    Respiratory:  Positive for sleep apnea  Cardiovascular:  Negative    Gastroenterology: Negative    Genitourinary:  Negative    Musculoskeletal:  Positive for arthritis  Neurologic:  Positive for numbness or tingling of hands  Psychiatric:  Positive for anxiety  Heme/Lymph/Imm:  Positive for    Endocrine:  Positive for thyroid disorder;diabetes    Physical Exam:  Vitals: /56  Pulse 65  Ht 1.562 m (5' 1.5\")  Wt 92.7 kg (204 lb 6.4 oz)  BMI 38 kg/m2    Constitutional:  cooperative obese      Skin:  warm and dry to the touch          Head:  normocephalic        Eyes:  pupils equal and round        Lymph:      ENT:  no pallor or cyanosis        Neck:  JVP normal;no stiffness        Respiratory:  normal breath sounds, clear to auscultation, normal A-P diameter, normal symmetry, normal respiratory excursion, no use of accessory muscles         Cardiac: regular rhythm;normal S1 and S2   distant heart sounds                                                right leg cool \"since polio\"    GI:  abdomen soft obese      Extremities and Muscular Skeletal:      bilateral LE edema;trace     brace on right ankle, left thigh with extensive ecchymosis into medial/lateral thigh, soft but extensive with +bruit at site.    Neurological:    limb weakness;foot " drop right LLE    Psych:  Alert and Oriented x 3        Recent Lab Results:  LIPID RESULTS:  Lab Results   Component Value Date    CHOL 185 01/29/2018    HDL 39 (A) 01/29/2018     (A) 01/29/2018    TRIG 290 (A) 01/29/2018    CHOLHDLRATIO 4.6 02/15/2017    CHOLHDLRATIO 4.6 03/31/2015       LIVER ENZYME RESULTS:  Lab Results   Component Value Date    AST 28 02/12/2018    ALT 29 02/12/2018       CBC RESULTS:  Lab Results   Component Value Date    WBC 8.8 02/28/2018    RBC 3.32 (L) 02/28/2018    HGB 10.1 (L) 02/28/2018    HCT 30.6 (L) 02/28/2018    MCV 92 02/28/2018    MCH 30.4 02/28/2018    MCHC 33.0 02/28/2018    RDW 12.7 02/28/2018     02/28/2018       BMP RESULTS:  Lab Results   Component Value Date     02/26/2018    POTASSIUM 4.8 02/26/2018    CHLORIDE 103 02/26/2018    CO2 29 02/26/2018    ANIONGAP 10.8 02/26/2018     (H) 02/26/2018    BUN 27 02/26/2018    CR 1.29 02/26/2018    GFRESTIMATED 41 (L) 02/26/2018    GFRESTBLACK 49 (L) 02/26/2018    RINKU 9.6 02/26/2018        A1C RESULTS:  Lab Results   Component Value Date    A1C 6.5 (H) 02/12/2018       INR RESULTS:  Lab Results   Component Value Date    INR 0.98 02/23/2018    INR 2.00 (H) 11/18/2013           CC  Shola Benoit MD  [address]  [place] [address]                  Thank you for allowing me to participate in the care of your patient.      Sincerely,     MAGALY [facility]    cc:   Shola Benoit MD  [address]  [place] [address]

## 2018-03-07 NOTE — MR AVS SNAPSHOT
After Visit Summary   3/7/2018    Chasity Hodgson    MRN: [id number]           Patient Information     Date Of Birth          1945        Visit Information        Provider Department      3/7/2018 12:00 PM Imani Louie, MAGALY CNP [facility]        Today's Diagnoses     Coronary artery disease involving native coronary artery of native heart without angina pectoris          Care Instructions    Try cutting atorvastatin (lipitor) in half to see how this affects your muscle aches    See Dr. Ortiz as her schedule allows.          Follow-ups after your visit        Your next 10 appointments already scheduled     Mar 14, 2018 12:00 PM CDT   [facility]    [address] [facility] [address] [phone]            Jun 04, 2018 10:50 AM CDT   (Arrive by 10:35 AM)   RETURN HAIRLOSS with Renetta Meyer MD   [facility] ([facility] [place])    [address] [place] [address] [phone]              Who to contact     If you have questions or need follow up information about today's clinic visit or your schedule please contact [facility] directly at [phone].  Normal or non-critical lab and imaging results will be communicated to you by MyChart, letter or phone within 4 business days after the clinic has received the results. If you do not hear from us within 7 days, please contact the clinic through MyChart or phone. If you have a critical or abnormal lab result, we will notify you by phone as soon as possible.  Submit refill requests through "[employer] or call your pharmacy and they will forward the refill request to us. Please allow 3 business days for your refill to be completed.          Additional Information About Your Visit        MyChart Information      "[employer] gives you secure access to your electronic health record. If you see a primary care provider, you can also send messages to your care team and make appointments. If you have questions, please call your primary care clinic.  If you do not have a primary care provider, please call [phone] and they will assist you.        Care EveryWhere ID     This is your Care EveryWhere ID. This could be used by other organizations to access your [facility] medical records  [id number]        Your Vitals Were     Pulse Height BMI (Body Mass Index)             65 1.562 m (5' 1.5\") 38 kg/m2          Blood Pressure from Last 3 Encounters:   03/07/18 124/56   02/28/18 139/63   02/26/18 140/74    Weight from Last 3 Encounters:   03/07/18 92.7 kg (204 lb 6.4 oz)   02/28/18 92.1 kg (203 lb)   02/26/18 92.1 kg (203 lb)              We Performed the Following     Follow-Up with Cardiac Advanced Practice Provider        Primary Care Provider Office Phone # Fax #    Shola Benoit [phone] [address] [facility] [address]  [facility] [address]        Equal Access to Services     JONA ROBB : Hadii aad ku hadasho Soomaali, waaxda luqadaha, qaybta kaalmada adeegyada, jose baldwin . So [facility] [phone].    ATENCIÓN: Si habla español, tiene a modi disposición servicios gratuitos de asistencia lingüística. Llame al [phone].    We comply with applicable federal civil rights laws and [place] laws. We do not discriminate on the basis of race, color, national origin, age, disability, sex, sexual orientation, or gender identity.            Thank you!     Thank you for choosing [facility]  for your care. Our goal is always to provide you with excellent care. Hearing back from our patients is one way we can continue to improve our services. Please take a few minutes to complete the written survey that you may receive in the mail after your visit " with us. Thank you!             Your Updated Medication List - Protect others around you: Learn how to safely use, store and throw away your medicines at www.disposemymeds.org.          This list is accurate as of 3/7/18 12:41 PM.  Always use your most recent med list.                   Brand Name Dispense Instructions for use Diagnosis    ascorbic acid 1000 MG Tabs    vitamin C     Take 1,000 mg by mouth daily        ASTEPRO NA           atenolol 50 MG tablet    TENORMIN    180 tablet    Take 1 tablet (50 mg) by mouth 2 times daily    Essential hypertension       atorvastatin 20 MG tablet    LIPITOR     Take 20 mg by mouth daily        BIOTIN PO      Take 1,000 mcg by mouth        blood glucose monitoring test strip    no brand specified    1 Box    Use to test blood sugar 1 times daily or as directed.    Type 2 diabetes mellitus with other specified complication (H)       clotrimazole 1 % cream    LOTRIMIN     Apply topically daily        cycloSPORINE 0.05 % ophthalmic emulsion    RESTASIS     1 drop 2 times daily        diphenhydrAMINE-acetaminophen  MG tablet    TYLENOL PM     Take 1 tablet by mouth At Bedtime Reported on 3/20/2017        famotidine 40 MG tablet    PEPCID     Take 40 mg by mouth At Bedtime        fenofibrate 145 MG tablet     90 tablet    Take 1 tablet (145 mg) by mouth daily    CARDIOVASCULAR SCREENING; LDL GOAL LESS THAN 130       ferrous sulfate 325 (65 FE) MG tablet    IRON     Take 325 mg by mouth daily (with breakfast)        fexofenadine 180 MG tablet    ALLEGRA    120    Take 180 mg by mouth daily.        fluticasone 50 MCG/ACT spray    FLONASE    1 Bottle    Spray 2 sprays in nostril daily 2 sprays in each nostril qd    Seasonal allergic rhinitis due to other allergic trigger       gabapentin 100 MG capsule    NEURONTIN    90 capsule    Take 1 capsule (100 mg) by mouth every evening as needed    Poliomyelitis       hydrochlorothiazide 25 MG tablet    HYDRODIURIL    90 tablet     Take 1 tablet (25 mg) by mouth daily    Essential hypertension       lisinopril 20 MG tablet    PRINIVIL/ZESTRIL    90 tablet    Take 1 tablet (20 mg) by mouth daily    Essential hypertension       metFORMIN 500 MG tablet    GLUCOPHAGE     Take 500 mg by mouth daily (with dinner)        metoclopramide 10 MG tablet    REGLAN     Take 1-2 tabs as needed        multivitamin per tablet     100    ONE DAILY        nitroGLYcerin 0.4 MG sublingual tablet    NITROSTAT    25 tablet    Place 1 tablet (0.4 mg) under the tongue every 5 minutes as needed for chest pain (no more than 3 in one hour; after 3rd, call 911.)    Atypical chest pain       nystatin cream    MYCOSTATIN     Apply topically daily as needed        nystatin-triamcinolone cream    MYCOLOG II     Apply topically daily as needed        ondansetron 4 MG tablet    ZOFRAN     Take 1 tablet by mouth every 6 hours as needed Reported on 3/20/2017        * order for DME     1 Units    Equipment being ordered: Oral appliance for sleep apnea    ANNETTE (obstructive sleep apnea)       * order for DME     1 each    Donut Pillow    Coccydynia       * order for DME     2 each    Equipment being ordered: Compression stockings - Knee High; 20-30 mmHg compression    Insufficiency, arterial, peripheral (H)       pantoprazole 40 MG EC tablet    PROTONIX     Take 40 mg by mouth 2 times daily        SB LOW DOSE ASA EC 81 MG EC tablet   Generic drug:  aspirin      Take 81 mg by mouth daily        sitagliptin 50 MG tablet    JANUVIA     Take 50 mg by mouth daily        SYNTHROID 112 MCG tablet   Generic drug:  levothyroxine      Take 112 mcg by mouth daily Take 112 mcg daily except for Friday takes an additional 56 mcg.  Brand name Synthroid        TOUJEO SOLOSTAR 300 UNIT/ML injection   Generic drug:  insulin glargine U-300      Inject 30 Units Subcutaneous every morning Took 14 units this am        triamcinolone 0.1 % cream    KENALOG     Apply topically daily        VITAMIN D-3 PO       Take 2 tablets by mouth        * Notice:  This list has 3 medication(s) that are the same as other medications prescribed for you. Read the directions carefully, and ask your doctor or other care provider to review them with you.

## 2018-03-07 NOTE — PATIENT INSTRUCTIONS
Try cutting atorvastatin (lipitor) in half to see how this affects your muscle aches    See Dr. Ortiz as her schedule allows.

## 2018-03-07 NOTE — LETTER
3/7/2018      Shola Benoit MD, MD  [address] [facility] [address]  [facility] [address]      RE: Chasity Hodgson       Dear Colleague,    I had the pleasure of seeing Chasity Hodgson in the [facility].    Service Date: 03/07/2018      HISTORY OF PRESENT ILLNESS:  Chasity Hodgson is a pleasant 72-year-old woman who is here today in followup to review the results of a coronary angiogram and for reassessment of a large hematoma that developed following her angiogram.  She is followed in our office by Dr. Andreea Ortiz.  Right shoulder replacement has been scheduled.  She underwent a cardiac clearance workup prior to her surgery.  Her stress test was abnormal, and she was set up for coronary angiogram on 02/23/2018.  The stress test suggested anterior septal ischemia.  She had the angiogram on 02/23/2018.  It showed very minimal nonocclusive coronary artery disease.  A right femoral approach was used.  The patient then presented early the next week with a painful groin site.  An ultrasound was done that showed no pseudoaneurysm or fistula.  It demonstrated an 8.6 x 4.4 x 1.2 cm hematoma in the left groin, the area of bruising with no active blood flow.  The patient followed up with her primary care doctor, who stopped her aspirin for a few days and checked her hemoglobin, which was 10.1.  This was a 1 gram drop from the previous week.  Today, she is here in followup.  Today, she denies any chest pain at all.  She denies shortness of breath.  She says her groin site is healing well.      She does report some muscle myalgias.  She is wondering if it is from her statin therapy.  Currently, she is taking atorvastatin 20 mg per day.  Previously she had muscle myalgias with Crestor, which resolved when the Crestor was stopped.  Please see review of systems and physical exam.      IMPRESSION AND PLAN:   1.  Patient with a history of atypical chest pain with recent testing for cardiac clearance prior to a  right shoulder replacement.  An abnormal nuclear stress test showed a small to moderate area of apical anterior, anterior lateral and lateral ischemia extending through the apical segments.  There was a small to moderate area of ischemia at the anterior-anterolateral base.  She underwent a coronary angiogram that just showed nonocclusive coronary artery disease.  Dr. Ortiz did review the results of the coronary angiogram and gave cardiac clearance for her right shoulder surgery.   2.  Postop hematoma.  The patient's hematoma is now resolving.  It is nontender.  She has resumed all of her physical activities.   3.  Hyperlipidemia with some reported muscle myalgias, specifically in her legs and feet.  Today I am recommending we reduce her atorvastatin from 20 mg to 10 mg to see how this affects her muscle aches.      It has been my pleasure to work with Chasity.  She will follow up with Dr. Ortiz.      Imani Louie, MS, ANP         MAGALY SINGH, CNP             D: 2018   T: 2018   MT: MARY LOU      Name:     CHASITY BROOKE   MRN:      [id number]        Account:      [id number]   :      1945           Service Date: 2018      Document: [id number]           Outpatient Encounter Prescriptions as of 3/7/2018   Medication Sig Dispense Refill     atorvastatin (LIPITOR) 20 MG tablet Take 20 mg by mouth daily       ferrous sulfate (IRON) 325 (65 FE) MG tablet Take 325 mg by mouth daily (with breakfast)       atenolol (TENORMIN) 50 MG tablet Take 1 tablet (50 mg) by mouth 2 times daily 180 tablet 3     gabapentin (NEURONTIN) 100 MG capsule Take 1 capsule (100 mg) by mouth every evening as needed 90 capsule 3     metFORMIN (GLUCOPHAGE) 500 MG tablet Take 500 mg by mouth daily (with dinner)        hydrochlorothiazide (HYDRODIURIL) 25 MG tablet Take 1 tablet (25 mg) by mouth daily 90 tablet 3     pantoprazole (PROTONIX) 40 MG EC tablet Take 40 mg by mouth 2 times daily       insulin glargine U-300 (TOUJEO  SOLOSTAR) 300 UNIT/ML injection Inject 30 Units Subcutaneous every morning Took 14 units this am       famotidine (PEPCID) 40 MG tablet Take 40 mg by mouth At Bedtime       Azelastine HCl (ASTEPRO NA)        triamcinolone (KENALOG) 0.1 % cream Apply topically daily       clotrimazole (LOTRIMIN) 1 % cream Apply topically daily       nitroGLYcerin (NITROSTAT) 0.4 MG sublingual tablet Place 1 tablet (0.4 mg) under the tongue every 5 minutes as needed for chest pain (no more than 3 in one hour; after 3rd, call 911.) 25 tablet 3     lisinopril (PRINIVIL/ZESTRIL) 20 MG tablet Take 1 tablet (20 mg) by mouth daily 90 tablet 2     fenofibrate 145 MG tablet Take 1 tablet (145 mg) by mouth daily 90 tablet 2     order for DME Equipment being ordered: Compression stockings - Knee High; 20-30 mmHg compression 2 each 0     order for DME Donut Pillow 1 each 0     metoclopramide (REGLAN) 10 MG tablet Take 1-2 tabs as needed       fluticasone (FLONASE) 50 MCG/ACT spray Spray 2 sprays in nostril daily 2 sprays in each nostril qd 1 Bottle 0     order for DME Equipment being ordered: Oral appliance for sleep apnea 1 Units 0     cycloSPORINE (RESTASIS) 0.05 % ophthalmic emulsion 1 drop 2 times daily       sitagliptin (JANUVIA) 50 MG tablet Take 50 mg by mouth daily       Cholecalciferol (VITAMIN D-3 PO) Take 2 tablets by mouth       BIOTIN PO Take 1,000 mcg by mouth       blood glucose monitoring (NO BRAND SPECIFIED) test strip Use to test blood sugar 1 times daily or as directed. 1 Box 6     nystatin-triamcinolone (MYCOLOG II) cream Apply topically daily as needed       ascorbic acid (VITAMIN C) 1000 MG TABS Take 1,000 mg by mouth daily       ondansetron (ZOFRAN) 4 MG tablet Take 1 tablet by mouth every 6 hours as needed Reported on 3/20/2017       aspirin (SB LOW DOSE ASA EC) 81 MG EC tablet Take 81 mg by mouth daily       nystatin (MYCOSTATIN) cream Apply topically daily as needed        levothyroxine (SYNTHROID) 112 MCG tablet Take  112 mcg by mouth daily Take 112 mcg daily except for Friday takes an additional 56 mcg.  Brand name Synthroid       diphenhydrAMINE-acetaminophen (TYLENOL PM)  MG tablet Take 1 tablet by mouth At Bedtime Reported on 3/20/2017       fexofenadine (ALLEGRA) 180 MG tablet Take 180 mg by mouth daily. 120 0     MULTIVITAMINS OR TABS ONE DAILY 100 3     [DISCONTINUED] sucralfate (CARAFATE) 1 GM/10ML suspension Take 1 g by mouth 2 times daily as needed       [DISCONTINUED] nitroGLYcerin (NITRODUR) 0.3 MG/HR 24 hr patch Place 1 patch onto the skin daily 30 patch 1     No facility-administered encounter medications on file as of 3/7/2018.      Again, thank you for allowing me to participate in the care of your patient.      Sincerely,    MAGALY [facility]

## 2018-03-07 NOTE — PROGRESS NOTES
HPI and Plan:   See dictation    No orders of the defined types were placed in this encounter.    No orders of the defined types were placed in this encounter.    Medications Discontinued During This Encounter   Medication Reason     nitroGLYcerin (NITRODUR) 0.3 MG/HR 24 hr patch Discontinued by another Health Care Provider     sucralfate (CARAFATE) 1 GM/10ML suspension Stopped by Patient         Encounter Diagnosis   Name Primary?     Coronary artery disease involving native coronary artery of native heart without angina pectoris        CURRENT MEDICATIONS:  Current Outpatient Prescriptions   Medication Sig Dispense Refill     atorvastatin (LIPITOR) 20 MG tablet Take 20 mg by mouth daily       ferrous sulfate (IRON) 325 (65 FE) MG tablet Take 325 mg by mouth daily (with breakfast)       atenolol (TENORMIN) 50 MG tablet Take 1 tablet (50 mg) by mouth 2 times daily 180 tablet 3     gabapentin (NEURONTIN) 100 MG capsule Take 1 capsule (100 mg) by mouth every evening as needed 90 capsule 3     metFORMIN (GLUCOPHAGE) 500 MG tablet Take 500 mg by mouth daily (with dinner)        hydrochlorothiazide (HYDRODIURIL) 25 MG tablet Take 1 tablet (25 mg) by mouth daily 90 tablet 3     pantoprazole (PROTONIX) 40 MG EC tablet Take 40 mg by mouth 2 times daily       insulin glargine U-300 (TOUJEO SOLOSTAR) 300 UNIT/ML injection Inject 30 Units Subcutaneous every morning Took 14 units this am       famotidine (PEPCID) 40 MG tablet Take 40 mg by mouth At Bedtime       Azelastine HCl (ASTEPRO NA)        triamcinolone (KENALOG) 0.1 % cream Apply topically daily       clotrimazole (LOTRIMIN) 1 % cream Apply topically daily       nitroGLYcerin (NITROSTAT) 0.4 MG sublingual tablet Place 1 tablet (0.4 mg) under the tongue every 5 minutes as needed for chest pain (no more than 3 in one hour; after 3rd, call 911.) 25 tablet 3     lisinopril (PRINIVIL/ZESTRIL) 20 MG tablet Take 1 tablet (20 mg) by mouth daily 90 tablet 2     fenofibrate 145  MG tablet Take 1 tablet (145 mg) by mouth daily 90 tablet 2     order for DME Equipment being ordered: Compression stockings - Knee High; 20-30 mmHg compression 2 each 0     order for DME Donut Pillow 1 each 0     metoclopramide (REGLAN) 10 MG tablet Take 1-2 tabs as needed       fluticasone (FLONASE) 50 MCG/ACT spray Spray 2 sprays in nostril daily 2 sprays in each nostril qd 1 Bottle 0     order for DME Equipment being ordered: Oral appliance for sleep apnea 1 Units 0     cycloSPORINE (RESTASIS) 0.05 % ophthalmic emulsion 1 drop 2 times daily       sitagliptin (JANUVIA) 50 MG tablet Take 50 mg by mouth daily       Cholecalciferol (VITAMIN D-3 PO) Take 2 tablets by mouth       BIOTIN PO Take 1,000 mcg by mouth       blood glucose monitoring (NO BRAND SPECIFIED) test strip Use to test blood sugar 1 times daily or as directed. 1 Box 6     nystatin-triamcinolone (MYCOLOG II) cream Apply topically daily as needed       ascorbic acid (VITAMIN C) 1000 MG TABS Take 1,000 mg by mouth daily       ondansetron (ZOFRAN) 4 MG tablet Take 1 tablet by mouth every 6 hours as needed Reported on 3/20/2017       aspirin (SB LOW DOSE ASA EC) 81 MG EC tablet Take 81 mg by mouth daily       nystatin (MYCOSTATIN) cream Apply topically daily as needed        levothyroxine (SYNTHROID) 112 MCG tablet Take 112 mcg by mouth daily Take 112 mcg daily except for Friday takes an additional 56 mcg.  Brand name Synthroid       diphenhydrAMINE-acetaminophen (TYLENOL PM)  MG tablet Take 1 tablet by mouth At Bedtime Reported on 3/20/2017       fexofenadine (ALLEGRA) 180 MG tablet Take 180 mg by mouth daily. 120 0     MULTIVITAMINS OR TABS ONE DAILY 100 3     [DISCONTINUED] nitroGLYcerin (NITRODUR) 0.3 MG/HR 24 hr patch Place 1 patch onto the skin daily 30 patch 1       ALLERGIES     Allergies   Allergen Reactions     Nsaids Difficulty breathing     Increased creatinine     Toradol Difficulty breathing     Shortness of breath     Celecoxib  Itching and Rash     Codeine Itching     With higher doses     Crestor [Rosuvastatin] Muscle Pain (Myalgia)     No Clinical Screening - See Comments Itching     Fragrance     Vioxx Other (See Comments)     Heart races     Conjugated Estrogens Rash     Sulfa Drugs Rash       PAST MEDICAL HISTORY:  Past Medical History:   Diagnosis Date     Abdominal adhesions 1984, 96,99    s/p lysis     Allergic rhinitis, cause unspecified      Carotid stenosis      CPAP (continuous positive airway pressure) dependence      Diabetic gastroparesis (H)      Diet-controlled type 2 diabetes mellitus (H)      DVT of axillary vein, acute right (H)      Fibromyalgia      Gastro-oesophageal reflux disease      Hernia of unspecified site of abdominal cavity without mention of obstruction or gangrene     bilateral     HTN (hypertension)      Hypertriglyceridemia      Obstructive sleep apnea      ANNETTE (obstructive sleep apnea)      Papillary carcinoma of thyroid (H)     s/p thyroidectomy - Ruegemer     PE (pulmonary embolism)      Poliomyelitis     poor circulation right leg     Postsurgical hypothyroidism     s/p papillary thryoid cancer - Ruegemer     Pulmonary embolism (H)      Rosacea      S/P carpal tunnel release     bilateral     S/P hardware removal 01/2014    still with lingering foot pain     S/P shoulder surgery     bilateral     Septic joint (H)     right knee     Venous insufficiency      Venous thrombosis 1999    right axillary vein       PAST SURGICAL HISTORY:  Past Surgical History:   Procedure Laterality Date     AMPUTATE TOE(S)  3/15/2012    Procedure:AMPUTATE TOE(S); Surgeon:RUBEN MOSES; Location: OR     APPENDECTOMY  1972     ARTHRODESIS FOOT  3/15/2012    Procedure:ARTHRODESIS FOOT; RIGHT TRIPLE ARTHRODESIS, FIFTH TOE AMPUTATION, LATERAL LIGAMENT RECONSTRUCTION, TENDON TRANSFER AND RELEASE [MINI C-ARM, ARTHREX 4.5 AND 6.7 STAPLES, BIOCOMPOSITE TENODESIS SCREWS]; Surgeon:RUBEN MOSES; Location:  OR     C FREEING BOWEL ADHESION,ENTEROLYSIS      1986, 1996, 1999     C NONSPECIFIC PROCEDURE      throidectomy     C TOTAL ABDOM HYSTERECTOMY  1980    + BSO     CHOLECYSTECTOMY       COLOSTOMY  2/7/2012    Procedure:COLOSTOMY; CREATION OF SIGMOID COLOSTOMY AND EXTENSIVE  LYSIS OF ADHESIONS; Surgeon:MONTSERRAT BENDER; Location: OR     GI SURGERY      weakened rectal sphincter with artificial stimulator     LAPAROTOMY, LYSIS ADHESIONS, COMBINED  2/7/2012    Procedure:COMBINED LAPAROTOMY, LYSIS ADHESIONS; Surgeon:MONTSERRAT BENDER; Location: OR     RELEASE TENDON FOOT  3/15/2012    Procedure:RELEASE TENDON FOOT; Surgeon:SUKHDEEP METZ; Location: OR     REMOVE HARDWARE FOOT  12/13/2012    Procedure: REMOVE HARDWARE FOOT;  RIGHT FOOT REMOVAL OF HARDWARE;  Surgeon: Sukhdeep Metz MD;  Location:  OR       FAMILY HISTORY:  Family History   Problem Relation Age of Onset     Arthritis Mother      Hypertension Mother      CEREBROVASCULAR DISEASE Mother      Obesity Mother      HEART DISEASE Mother      MI's     Hypertension Father      Respiratory Father      Adult RDS     DIABETES Father      adult     Arthritis Sister      CANCER Sister      Arthritis Sister      Hypertension Sister      CANCER Sister      colon polup     HEART DISEASE Brother      MI at 54     Hypertension Sister      Lipids Brother      Hypertension Brother      Lipids Sister      Obesity Sister      Obesity Maternal Grandmother      Skin Cancer Maternal Grandmother      skin cancer unknown     CANCER Maternal Grandmother      unknown skin cancer on face       SOCIAL HISTORY:  Social History     Social History     Marital status:      Spouse name: N/A     Number of children: N/A     Years of education: N/A     Social History Main Topics     Smoking status: Never Smoker     Smokeless tobacco: Never Used     Alcohol use No      Comment: very rare     Drug use: No     Sexual activity: Not Currently     Partners: Male      "Birth control/ protection: Female Surgical     Other Topics Concern     Caffeine Concern Yes     occ     Sleep Concern Yes     Stress Concern Yes     Special Diet Yes     low carb, low sugar      Exercise No     occ. 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[employer]        Review of Systems:  Skin:  Negative     Eyes:  Positive for glasses  ENT:  Negative    Respiratory:  Positive for sleep apnea  Cardiovascular:  Negative    Gastroenterology: Negative    Genitourinary:  Negative    Musculoskeletal:  Positive for arthritis  Neurologic:  Positive for numbness or tingling of hands  Psychiatric:  Positive for anxiety  Heme/Lymph/Imm:  Positive for    Endocrine:  Positive for thyroid disorder;diabetes    Physical Exam:  Vitals: /56  Pulse 65  Ht 1.562 m (5' 1.5\")  Wt 92.7 kg (204 lb 6.4 oz)  BMI 38 kg/m2    Constitutional:  cooperative obese      Skin:  warm and dry to the touch          Head:  normocephalic        Eyes:  pupils equal and round        Lymph:      ENT:  no pallor or cyanosis        Neck:  JVP normal;no stiffness        Respiratory:  normal breath sounds, clear to auscultation, normal A-P diameter, normal symmetry, normal respiratory excursion, no use of accessory muscles         Cardiac: regular rhythm;normal S1 and S2   distant heart sounds                                                right leg cool \"since polio\"    GI:  abdomen soft obese      Extremities and Muscular Skeletal:      bilateral LE edema;trace     brace on right ankle, left thigh with extensive ecchymosis into medial/lateral thigh, soft but extensive with +bruit at site.    Neurological:    limb weakness;foot drop right LLE    Psych:  Alert and Oriented x 3        Recent Lab Results:  LIPID RESULTS:  Lab Results   Component Value Date    CHOL 185 01/29/2018    HDL 39 (A) 01/29/2018     (A) 01/29/2018    TRIG 290 (A) 01/29/2018    " CHOLHDLRATIO 4.6 02/15/2017    CHOLHDLRATIO 4.6 03/31/2015       LIVER ENZYME RESULTS:  Lab Results   Component Value Date    AST 28 02/12/2018    ALT 29 02/12/2018       CBC RESULTS:  Lab Results   Component Value Date    WBC 8.8 02/28/2018    RBC 3.32 (L) 02/28/2018    HGB 10.1 (L) 02/28/2018    HCT 30.6 (L) 02/28/2018    MCV 92 02/28/2018    MCH 30.4 02/28/2018    MCHC 33.0 02/28/2018    RDW 12.7 02/28/2018     02/28/2018       BMP RESULTS:  Lab Results   Component Value Date     02/26/2018    POTASSIUM 4.8 02/26/2018    CHLORIDE 103 02/26/2018    CO2 29 02/26/2018    ANIONGAP 10.8 02/26/2018     (H) 02/26/2018    BUN 27 02/26/2018    CR 1.29 02/26/2018    GFRESTIMATED 41 (L) 02/26/2018    GFRESTBLACK 49 (L) 02/26/2018    RINKU 9.6 02/26/2018        A1C RESULTS:  Lab Results   Component Value Date    A1C 6.5 (H) 02/12/2018       INR RESULTS:  Lab Results   Component Value Date    INR 0.98 02/23/2018    INR 2.00 (H) 11/18/2013           CC  Shola Benoit MD  [address]  [place] [address]

## 2018-03-08 NOTE — PROGRESS NOTES
Service Date: 03/07/2018      HISTORY OF PRESENT ILLNESS:  Chasity Hodgson is a pleasant 72-year-old woman who is here today in followup to review the results of a coronary angiogram and for reassessment of a large hematoma that developed following her angiogram.  She is followed in our office by Dr. Andreea Ortiz.  Right shoulder replacement has been scheduled.  She underwent a cardiac clearance workup prior to her surgery.  Her stress test was abnormal, and she was set up for coronary angiogram on 02/23/2018.  The stress test suggested anterior septal ischemia.  She had the angiogram on 02/23/2018.  It showed very minimal nonocclusive coronary artery disease.  A right femoral approach was used.  The patient then presented early the next week with a painful groin site.  An ultrasound was done that showed no pseudoaneurysm or fistula.  It demonstrated an 8.6 x 4.4 x 1.2 cm hematoma in the left groin, the area of bruising with no active blood flow.  The patient followed up with her primary care doctor, who stopped her aspirin for a few days and checked her hemoglobin, which was 10.1.  This was a 1 gram drop from the previous week.  Today, she is here in followup.  Today, she denies any chest pain at all.  She denies shortness of breath.  She says her groin site is healing well.      She does report some muscle myalgias.  She is wondering if it is from her statin therapy.  Currently, she is taking atorvastatin 20 mg per day.  Previously she had muscle myalgias with Crestor, which resolved when the Crestor was stopped.  Please see review of systems and physical exam.      IMPRESSION AND PLAN:   1.  Patient with a history of atypical chest pain with recent testing for cardiac clearance prior to a right shoulder replacement.  An abnormal nuclear stress test showed a small to moderate area of apical anterior, anterior lateral and lateral ischemia extending through the apical segments.  There was a small to moderate area of  ischemia at the anterior-anterolateral base.  She underwent a coronary angiogram that just showed nonocclusive coronary artery disease.  Dr. Ortiz did review the results of the coronary angiogram and gave cardiac clearance for her right shoulder surgery.   2.  Postop hematoma.  The patient's hematoma is now resolving.  It is nontender.  She has resumed all of her physical activities.   3.  Hyperlipidemia with some reported muscle myalgias, specifically in her legs and feet.  Today I am recommending we reduce her atorvastatin from 20 mg to 10 mg to see how this affects her muscle aches.      It has been my pleasure to work with Chasity.  She will follow up with Dr. Ortiz.      Imani Louie MS, ANP         MAGALY SINGH, CNP             D: 2018   T: 2018   MT: MARY LOU      Name:     CHASITY BROOKE   MRN:      [id number]        Account:      [id number]   :      1945           Service Date: 2018      Document: [id number]

## 2018-03-13 NOTE — PATIENT INSTRUCTIONS
Before Your Surgery      Call your surgeon if there is any change in your health. This includes signs of a cold or flu (such as a sore throat, runny nose, cough, rash or fever).    Do not smoke, drink alcohol or take over the counter medicine (unless your surgeon or primary care doctor tells you to) for the 24 hours before and after surgery.    If you take prescribed drugs: Follow your doctor s orders about which medicines to take and which to stop until after surgery.    Eating and drinking prior to surgery: follow the instructions from your surgeon    Take a shower or bath the night before surgery. Use the soap your surgeon gave you to gently clean your skin. If you do not have soap from your surgeon, use your regular soap. Do not shave or scrub the surgery site.  Wear clean pajamas and have clean sheets on your bed.     (Z01.818) Preop general physical exam  (primary encounter diagnosis)  Comment:  Recent labs, EKG, coronary angiogram reviewed.  For now, hold aspirin 81 mg for 1 week prior to surgery.  Continue statin.  Hold lisinopril an hydrochlorothiazide on the day of surgery.  Hold metformin and januvia and take 1/2 of your normal insulin dose that morning. 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  Continue aspirin, beta blocker, and statin adjusted to stop atorvastatin and start crestor  Plan:

## 2018-03-13 NOTE — PROGRESS NOTES
[address] [address]  [phone]  Dept: [phone]    PRE-OP EVALUATION:  Today's date: 3/14/2018    Chasity Hodgson (: 1945) presents for pre-operative evaluation assessment as requested by Dr. Ferrera.  She requires evaluation and anesthesia risk assessment prior to undergoing surgery/procedure for treatment of right total shoulder replacement .    Proposed Surgery/ Procedure:   Date of Surgery/ Procedure: 18  Time of Surgery/ Procedure: ?  Hospital/Surgical Facility: [facility]  Primary Physician: Shola Benoit  Type of Anesthesia Anticipated: General    Patient has a Health Care Directive or Living Will:  NO    1. NO - Do you have a history of heart attack, stroke, stent, bypass or surgery on an artery in the head, neck, heart or legs?  2. NO - Do you ever have any pain or discomfort in your chest?  3. NO - Do you have a history of  Heart Failure?  4. NO - Are you troubled by shortness of breath when: walking on the level, up a slight hill or at night? - occasional chest pain.   Recent angiogram that showed very minimal coronary artery disease   5. NO - Do you currently have a cold, bronchitis or other respiratory infection?  6. NO - Do you have a cough, shortness of breath or wheezing?  7. NO - Do you sometimes get pains in the calves of your legs when you walk?  8. NO - Do you or anyone in your family have previous history of blood clots?  9. NO - Do you or does anyone in your family have a serious bleeding problem such as prolonged bleeding following surgeries or cuts?  10. NO - Have you ever had problems with anemia or been told to take iron pills?  11. NO - Have you had any abnormal blood loss such as black, tarry or bloody stools, or abnormal vaginal bleeding?  12. NO - Have you ever had a blood transfusion?  13. NO - Have you or any of your relatives ever had problems with anesthesia?  14. NO - Do you have sleep apnea, excessive snoring  or daytime drowsiness?  15. NO - Do you have any prosthetic heart valves?  16. NO - Do you have prosthetic joints?  17. NO - Is there any chance that you may be pregnant?      HPI:     HPI related to upcoming procedure: shoulder replacement      See problem list for active medical problems.  Problems all longstanding and stable, except as noted/documented.  See ROS for pertinent symptoms related to these conditions.                                                                                                  .    MEDICAL HISTORY:     Patient Active Problem List    Diagnosis Date Noted     Normocytic anemia 01/16/2017     Priority: Medium     Allergic dermatitis due to other chemical product 10/02/2016     Priority: Medium     Insufficiency, arterial, peripheral (H) 11/08/2015     Priority: Medium     Mild seen on CARMITA 11/2015 - right sided       Type 2 diabetes mellitus with other specified complication (H) 10/18/2015     Priority: Medium     Carotid stenosis 12/09/2014     Priority: Medium     Mild increased stenosis 50-69% left ICA       Right shoulder pain 12/09/2014     Priority: Medium     Left knee pain 11/03/2014     Priority: Medium     Poliomyelitis      Priority: Medium     poor circulation right leg       Diabetic gastroparesis (H)      Priority: Medium     Dermatitis 02/15/2014     Priority: Medium     Acne rosacea 02/15/2014     Priority: Medium     Esophageal reflux 01/23/2014     Priority: Medium     Had upper endoscopy - Dr. Pantoja 2013       Pulmonary nodule 09/06/2013     Priority: Medium     Alopecia 02/09/2013     Priority: Medium     Problem list name updated by automated process. Provider to review       Papillary carcinoma of thyroid (H)      Priority: Medium     s/p thyroidectomy - Ruegemer       Gastroparesis 11/12/2012     Priority: Medium     Postsurgical hypothyroidism      Priority: Medium     s/p papillary thryoid cancer - Marj  Concern for possible recurrence 03/2014       Type 2  diabetes, HbA1c goal < 7% (H)      Priority: Medium     Advanced directives, counseling/discussion 09/21/2012     Priority: Medium     Discussed advance care planning with patient; information given to patient to review. 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 Provider to review       ANNETTE (obstructive sleep apnea)      Priority: Medium     Mixed hyperlipidemia 10/31/2010     Priority: Medium     Low back pain 07/15/2009     Priority: Medium      Past Medical History:   Diagnosis Date     Abdominal adhesions 1984, 96,99    s/p lysis     Allergic rhinitis, cause unspecified      Carotid stenosis      CPAP (continuous positive airway pressure) dependence      Diabetic gastroparesis (H)      Diet-controlled type 2 diabetes mellitus (H)      DVT of axillary vein, acute right (H)      Fibromyalgia      Gastro-oesophageal reflux disease      Hernia of unspecified site of abdominal cavity without mention of obstruction or gangrene     bilateral     HTN (hypertension)      Hypertriglyceridemia      Obstructive sleep apnea      ANNETTE (obstructive sleep apnea)      Papillary carcinoma of thyroid (H)     s/p thyroidectomy - Ruegemer     PE (pulmonary embolism)      Poliomyelitis     poor circulation right leg     Postsurgical hypothyroidism     s/p papillary thryoid cancer - Ruegemer     Pulmonary embolism (H)      Rosacea      S/P carpal tunnel release     bilateral     S/P hardware removal 01/2014    still with lingering foot pain     S/P  shoulder surgery     bilateral     Septic joint (H)     right knee     Venous insufficiency      Venous thrombosis 1999    right axillary vein     Past Surgical History:   Procedure Laterality Date     AMPUTATE TOE(S)  3/15/2012    Procedure:AMPUTATE TOE(S); Surgeon:SUKHDEEP METZ; Location: OR     APPENDECTOMY  1972     ARTHRODESIS FOOT  3/15/2012    Procedure:ARTHRODESIS FOOT; RIGHT TRIPLE ARTHRODESIS, FIFTH TOE AMPUTATION, LATERAL LIGAMENT RECONSTRUCTION, TENDON TRANSFER AND RELEASE [MINI C-ARM, ARTHREX 4.5 AND 6.7 STAPLES, BIOCOMPOSITE TENODESIS SCREWS]; Surgeon:SUKHDEEP METZ; Location: OR     C FREEING BOWEL ADHESION,ENTEROLYSIS      1986, 1996, 1999     C NONSPECIFIC PROCEDURE      throidectomy     C TOTAL ABDOM HYSTERECTOMY  1980    + BSO     CHOLECYSTECTOMY       COLOSTOMY  2/7/2012    Procedure:COLOSTOMY; CREATION OF SIGMOID COLOSTOMY AND EXTENSIVE  LYSIS OF ADHESIONS; Surgeon:MONTSERRAT BENDER; Location: OR     GI SURGERY      weakened rectal sphincter with artificial stimulator     LAPAROTOMY, LYSIS ADHESIONS, COMBINED  2/7/2012    Procedure:COMBINED LAPAROTOMY, LYSIS ADHESIONS; Surgeon:MONTSERRAT BENDER; Location: OR     RELEASE TENDON FOOT  3/15/2012    Procedure:RELEASE TENDON FOOT; Surgeon:SUKHDEEP METZ; Location: OR     REMOVE HARDWARE FOOT  12/13/2012    Procedure: REMOVE HARDWARE FOOT;  RIGHT FOOT REMOVAL OF HARDWARE;  Surgeon: Sukhdeep Metz MD;  Location:  OR     Current Outpatient Prescriptions   Medication Sig Dispense Refill     atorvastatin (LIPITOR) 20 MG tablet Take 20 mg by mouth daily       ferrous sulfate (IRON) 325 (65 FE) MG tablet Take 325 mg by mouth daily (with breakfast)       atenolol (TENORMIN) 50 MG tablet Take 1 tablet (50 mg) by mouth 2 times daily 180 tablet 3     gabapentin (NEURONTIN) 100 MG capsule Take 1 capsule (100 mg) by mouth every evening as needed 90 capsule 3     metFORMIN (GLUCOPHAGE) 500 MG tablet Take 500  mg by mouth daily (with dinner)        hydrochlorothiazide (HYDRODIURIL) 25 MG tablet Take 1 tablet (25 mg) by mouth daily 90 tablet 3     pantoprazole (PROTONIX) 40 MG EC tablet Take 40 mg by mouth 2 times daily       insulin glargine U-300 (TOUJEO SOLOSTAR) 300 UNIT/ML injection Inject 30 Units Subcutaneous every morning Took 14 units this am       famotidine (PEPCID) 40 MG tablet Take 40 mg by mouth At Bedtime       Azelastine HCl (ASTEPRO NA)        triamcinolone (KENALOG) 0.1 % cream Apply topically daily       clotrimazole (LOTRIMIN) 1 % cream Apply topically daily       nitroGLYcerin (NITROSTAT) 0.4 MG sublingual tablet Place 1 tablet (0.4 mg) under the tongue every 5 minutes as needed for chest pain (no more than 3 in one hour; after 3rd, call 911.) 25 tablet 3     lisinopril (PRINIVIL/ZESTRIL) 20 MG tablet Take 1 tablet (20 mg) by mouth daily 90 tablet 2     fenofibrate 145 MG tablet Take 1 tablet (145 mg) by mouth daily 90 tablet 2     order for DME Equipment being ordered: Compression stockings - Knee High; 20-30 mmHg compression 2 each 0     order for DME Donut Pillow 1 each 0     metoclopramide (REGLAN) 10 MG tablet Take 1-2 tabs as needed       fluticasone (FLONASE) 50 MCG/ACT spray Spray 2 sprays in nostril daily 2 sprays in each nostril qd 1 Bottle 0     order for DME Equipment being ordered: Oral appliance for sleep apnea 1 Units 0     cycloSPORINE (RESTASIS) 0.05 % ophthalmic emulsion 1 drop 2 times daily       sitagliptin (JANUVIA) 50 MG tablet Take 50 mg by mouth daily       Cholecalciferol (VITAMIN D-3 PO) Take 2 tablets by mouth       BIOTIN PO Take 1,000 mcg by mouth       blood glucose monitoring (NO BRAND SPECIFIED) test strip Use to test blood sugar 1 times daily or as directed. 1 Box 6     nystatin-triamcinolone (MYCOLOG II) cream Apply topically daily as needed       ascorbic acid (VITAMIN C) 1000 MG TABS Take 1,000 mg by mouth daily       ondansetron (ZOFRAN) 4 MG tablet Take 1 tablet by  "mouth every 6 hours as needed Reported on 3/20/2017       aspirin (SB LOW DOSE ASA EC) 81 MG EC tablet Take 81 mg by mouth daily       nystatin (MYCOSTATIN) cream Apply topically daily as needed        levothyroxine (SYNTHROID) 112 MCG tablet Take 112 mcg by mouth daily Take 112 mcg daily except for Friday takes an additional 56 mcg.  Brand name Synthroid       diphenhydrAMINE-acetaminophen (TYLENOL PM)  MG tablet Take 1 tablet by mouth At Bedtime Reported on 3/20/2017       fexofenadine (ALLEGRA) 180 MG tablet Take 180 mg by mouth daily. 120 0     MULTIVITAMINS OR TABS ONE DAILY 100 3     OTC products: None, except as noted above    Allergies   Allergen Reactions     Nsaids Difficulty breathing     Increased creatinine     Toradol Difficulty breathing     Shortness of breath     Celecoxib Itching and Rash     Codeine Itching     With higher doses     Crestor [Rosuvastatin] Muscle Pain (Myalgia)     No Clinical Screening - See Comments Itching     Fragrance     Vioxx Other (See Comments)     Heart races     Conjugated Estrogens Rash     Sulfa Drugs Rash      Latex Allergy: NO    Social History   Substance Use Topics     Smoking status: Never Smoker     Smokeless tobacco: Never Used     Alcohol use No      Comment: very rare     History   Drug Use No       REVIEW OF SYSTEMS:   Constitutional, neuro, ENT, endocrine, pulmonary, cardiac, gastrointestinal - occasional gastroesophageal reflux, genitourinary, musculoskeletal, integument and psychiatric systems are negative, except as otherwise noted.    EXAM:   /66  Pulse 60  Temp 97.7  F (36.5  C) (Oral)  Ht 5' 1.5\" (1.562 m)  Wt 200 lb (90.7 kg)  SpO2 100%  Breastfeeding? No  BMI 37.18 kg/m2    GENERAL APPEARANCE: healthy, alert and no distress     EYES: EOMI, PERRL     HENT: ear canals and TM's normal and nose and mouth without ulcers or lesions     NECK: no adenopathy, no asymmetry, masses, or scars and thyroid normal to palpation     RESP: lungs " clear to auscultation - no rales, rhonchi or wheezes     CV: regular rates and rhythm, normal S1 S2, no S3 or S4 and no murmur, click or rub     ABDOMEN:  soft, nontender, ostomy in place     MS:  Support brace on lower extremity - 2+ edema.     SKIN: no suspicious lesions or rashes     NEURO: Normal strength and tone, sensory exam grossly normal, mentation intact and speech normal     PSYCH: mentation appears normal. and affect normal/bright     LYMPHATICS: No axillary, cervical, or supraclavicular nodes    DIAGNOSTICS:     EKG: sinus bradycardia, normal axis, normal intervals, no acute ST/T changes c/w ischemia, no LVH by voltage criteria, unchanged from previous tracings  Labs Resulted Today:   Results for orders placed or performed in visit on 03/14/18   CBC with platelets   Result Value Ref Range    WBC 6.7 4.0 - 11.0 10e9/L    RBC Count 3.58 (L) 3.8 - 5.2 10e12/L    Hemoglobin 10.9 (L) 11.7 - 15.7 g/dL    Hematocrit 33.6 (L) 35.0 - 47.0 %    MCV 94 78 - 100 fl    MCH 30.4 26.5 - 33.0 pg    MCHC 32.4 31.5 - 36.5 g/dL    RDW 13.0 10.0 - 15.0 %    Platelet Count 233 150 - 450 10e9/L     Labs Drawn and in Process:   Unresulted Labs Ordered in the Past 30 Days of this Admission     Date and Time Order Name Status Description    3/14/2018 1213 BASIC METABOLIC PANEL In process           Recent Labs   Lab Test  02/28/18   1308  02/26/18   1240  02/23/18   0625  02/12/18   1149  01/29/18 11/18/13   1258   HGB  10.1*   --   11.2*  11.0*   --    --    < >   --    PLT  245   --   233  221   --    --    < >   --    INR   --    --   0.98   --    --    --    --   2.00*   NA   --   138  134  139   < >   --    < >   --    POTASSIUM   --   4.8  4.0  4.3   < >  4.6   < >   --    CR   --   1.29  1.29*  1.33*   < >  1.41*   < >   --    A1C   --    --    --   6.5*   --   6.7*   < >   --     < > = values in this interval not displayed.        IMPRESSION:   Reason for surgery/procedure:  Degenerative joint disease  shoulder  Diagnosis/reason for consult: Preoperative Evaluation    The proposed surgical procedure is considered INTERMEDIATE risk.    REVISED CARDIAC RISK INDEX  The patient has the following serious cardiovascular risks for perioperative complications such as (MI, PE, VFib and 3  AV Block):  Coronary Artery Disease (MI, positive stress test, angina, Qs on EKG)  Diabetes Mellitus (on Insulin)  INTERPRETATION: 2 risks: Class III (moderate risk - 6.6% complication rate)    The patient has the following additional risks for perioperative complications:  No identified additional risks      ICD-10-CM    1. Preop general physical exam Z01.818        RECOMMENDATIONS:   (Z01.818) Preop general physical exam  (primary encounter diagnosis)  Comment:  Recent labs, EKG, coronary angiogram reviewed.  For now, hold aspirin 81 mg for 1 week prior to surgery.  Continue statin.  Hold lisinopril an hydrochlorothiazide on the day of surgery.  Hold metformin and januvia and take 1/2 of your normal insulin dose that morning. Plan: CBC with platelets, basic metabolic panel         (E11.69) Type 2 diabetes mellitus with other specified complication, without long-term current use of insulin (H)  Comment: As above - hold metformin and januvia as we discussed on the day of surgery  Plan:          (K21.9) Gastroesophageal reflux disease without esophagitis  Comment: Continue protonix  Plan:      (G47.33) ANNETTE (obstructive sleep apnea)  Comment: not using CPAP currently  Plan:      (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)  Comment: Stable, would recommend follow up in gastroenterology   Plan:      (E89.0) Postsurgical hypothyroidism  Comment: stable  Plan:      (D64.9) Normocytic anemia  Comment: As discussed - conitnued oral iron in the AM and I would recommend follow up in gastroenterology to review this prior to surgery   Plan:      (I25.10) Coronary artery disease involving native heart without angina pectoris, unspecified vessel or lesion  type  Comment: noted minimal coronary artery disease seen on angiogram.  Continue aspirin, beta blocker, and statin adjusted to stop atorvastatin and start crestor  Plan:          Signed Electronically by: Shola Benoit MD, MD    Copy of this evaluation report is provided to requesting physician.    [place] Preop Guidelines

## 2018-03-14 ENCOUNTER — OFFICE VISIT (OUTPATIENT)
Dept: FAMILY MEDICINE | Facility: CLINIC | Age: 73
End: 2018-03-14
Payer: MEDICARE

## 2018-03-14 VITALS
BODY MASS INDEX: 36.8 KG/M2 | DIASTOLIC BLOOD PRESSURE: 66 MMHG | OXYGEN SATURATION: 100 % | HEART RATE: 60 BPM | WEIGHT: 200 LBS | HEIGHT: 62 IN | TEMPERATURE: 97.7 F | SYSTOLIC BLOOD PRESSURE: 122 MMHG

## 2018-03-14 DIAGNOSIS — Z01.818 PREOP GENERAL PHYSICAL EXAM: Primary | ICD-10-CM

## 2018-03-14 DIAGNOSIS — G47.33 OSA (OBSTRUCTIVE SLEEP APNEA): ICD-10-CM

## 2018-03-14 DIAGNOSIS — I10 BENIGN ESSENTIAL HYPERTENSION: ICD-10-CM

## 2018-03-14 DIAGNOSIS — D62 ANEMIA DUE TO BLOOD LOSS, ACUTE: ICD-10-CM

## 2018-03-14 DIAGNOSIS — E78.5 HYPERLIPIDEMIA LDL GOAL <100: ICD-10-CM

## 2018-03-14 DIAGNOSIS — E11.69 TYPE 2 DIABETES MELLITUS WITH OTHER SPECIFIED COMPLICATION, WITHOUT LONG-TERM CURRENT USE OF INSULIN (H): ICD-10-CM

## 2018-03-14 LAB
ERYTHROCYTE [DISTWIDTH] IN BLOOD BY AUTOMATED COUNT: 13 % (ref 10–15)
HCT VFR BLD AUTO: 33.6 % (ref 35–47)
HGB BLD-MCNC: 10.9 G/DL (ref 11.7–15.7)
MCH RBC QN AUTO: 30.4 PG (ref 26.5–33)
MCHC RBC AUTO-ENTMCNC: 32.4 G/DL (ref 31.5–36.5)
MCV RBC AUTO: 94 FL (ref 78–100)
PLATELET # BLD AUTO: 233 10E9/L (ref 150–450)
RBC # BLD AUTO: 3.58 10E12/L (ref 3.8–5.2)
WBC # BLD AUTO: 6.7 10E9/L (ref 4–11)

## 2018-03-14 PROCEDURE — 80048 BASIC METABOLIC PNL TOTAL CA: CPT | Performed by: INTERNAL MEDICINE

## 2018-03-14 PROCEDURE — 99215 OFFICE O/P EST HI 40 MIN: CPT | Performed by: INTERNAL MEDICINE

## 2018-03-14 PROCEDURE — 85027 COMPLETE CBC AUTOMATED: CPT | Performed by: INTERNAL MEDICINE

## 2018-03-14 PROCEDURE — 36415 COLL VENOUS BLD VENIPUNCTURE: CPT | Performed by: INTERNAL MEDICINE

## 2018-03-14 RX ORDER — ROSUVASTATIN CALCIUM 10 MG/1
10 TABLET, COATED ORAL DAILY
Qty: 30 TABLET | Refills: 11 | Status: SHIPPED | OUTPATIENT
Start: 2018-03-14 | End: 2018-06-01

## 2018-03-14 NOTE — MR AVS SNAPSHOT
After Visit Summary   3/14/2018    Chasity Hodgson    MRN: [id number]           Patient Information     Date Of Birth          1945        Visit Information        Provider Department      3/14/2018 12:00 PM Shola Benoit MD [facility]        Today's Diagnoses     Preop general physical exam    -  1    Type 2 diabetes mellitus with other specified complication, without long-term current use of insulin (H)        ANNETTE (obstructive sleep apnea)        Benign essential hypertension        Anemia due to blood loss, acute        Hyperlipidemia LDL goal <100          Care Instructions      Before Your Surgery      Call your surgeon if there is any change in your health. This includes signs of a cold or flu (such as a sore throat, runny nose, cough, rash or fever).    Do not smoke, drink alcohol or take over the counter medicine (unless your surgeon or primary care doctor tells you to) for the 24 hours before and after surgery.    If you take prescribed drugs: Follow your doctor s orders about which medicines to take and which to stop until after surgery.    Eating and drinking prior to surgery: follow the instructions from your surgeon    Take a shower or bath the night before surgery. Use the soap your surgeon gave you to gently clean your skin. If you do not have soap from your surgeon, use your regular soap. Do not shave or scrub the surgery site.  Wear clean pajamas and have clean sheets on your bed.     (Z01.818) Preop general physical exam  (primary encounter diagnosis)  Comment:  Recent labs, EKG, coronary angiogram reviewed.  For now, hold aspirin 81 mg for 1 week prior to surgery.  Continue statin.  Hold lisinopril an hydrochlorothiazide on the day of surgery.  Hold metformin and januvia and take 1/2 of your normal insulin dose that morning. 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  Continue aspirin, beta blocker, and statin adjusted to stop atorvastatin and start crestor  Plan:           Follow-ups after your visit        Your next 10 appointments already scheduled     Jun 04, 2018 10:50 AM CDT   (Arrive by 10:35 AM)   RETURN [facility] with Renetta Meyer MD   [facility] ([facility])    [address] [place] [id number] [phone]              Who to contact     If you have questions or need follow up information about today's clinic visit or your schedule please contact [facility] directly at [phone].  Normal or non-critical lab and imaging results will be communicated to you by MyChart, letter or phone within 4 business days after the clinic has received the results. If you do not hear from us within 7 days, please contact the clinic through MyChart or phone. If you have a critical or abnormal lab result, we will notify you by phone as soon as possible.  Submit refill requests through [employer] or call your pharmacy and they will forward the refill request to  "us. Please allow 3 business days for your refill to be completed.          Additional Information About Your Visit        MyChart Information     [employer] gives you secure access to your electronic health record. If you see a primary care provider, you can also send messages to your care team and make appointments. If you have questions, please call your primary care clinic.  If you do not have a primary care provider, please call [phone] and they will assist you.        Care EveryWhere ID     This is your Care EveryWhere ID. This could be used by other organizations to access your [place] medical records  [id number]        Your Vitals Were     Pulse Temperature Height Pulse Oximetry Breastfeeding? BMI (Body Mass Index)    60 97.7  F (36.5  C) (Oral) 5' 1.5\" (1.562 m) 100% No 37.18 kg/m2       Blood Pressure from Last 3 Encounters:   03/14/18 122/66   03/07/18 124/56   02/28/18 139/63    Weight from Last 3 Encounters:   03/14/18 200 lb (90.7 kg)   03/07/18 204 lb 6.4 oz (92.7 kg)   02/28/18 203 lb (92.1 kg)              We Performed the Following     Basic metabolic panel     CBC with platelets          Today's Medication Changes          These changes are accurate as of 3/14/18 12:17 PM.  If you have any questions, ask your nurse or doctor.               Start taking these medicines.        Dose/Directions    rosuvastatin 10 MG tablet   Commonly known as:  CRESTOR   Used for:  Hyperlipidemia LDL goal <100   Started by:  Shola Benoit MD        Dose:  10 mg   Take 1 tablet (10 mg) by mouth daily   Quantity:  30 tablet   Refills:  11         Stop taking these medicines if you haven't already. Please contact your care team if you have questions.     atorvastatin 20 MG tablet   Commonly known as:  LIPITOR   Stopped by:  Shola Benoit MD                Where to get your medicines      These medications were sent to [facility] [place], [place] - [address] [facility]  [address] [address], [facility] [address]     Phone:  [phone]     rosuvastatin 10 MG tablet                Primary Care Provider Office Phone # Fax #    Shola Benoit [phone] [address] [facility] [address]  [facility] [address]        Equal Access to Services     CORINNE ROBB : Hadii aad ku hadasho Soomaali, waaxda luqadaha, qaybta kaalmada adeegyada, waxay idiin hayaan adeeg kharash la'aan . So [facility] [phone].    ATENCIÓN: Si habla español, tiene a modi disposición servicios gratuitos de asistencia lingüística. Llame al [phone].    We comply with applicable federal civil rights laws and [place] laws. We do not discriminate on the basis of race, color, national origin, age, disability, sex, sexual orientation, or gender identity.            Thank you!     Thank you for choosing [facility]  for your care. Our goal is always to provide you with excellent care. Hearing back from our patients is one way we can continue to improve our services. Please take a few minutes to complete the written survey that you may receive in the mail after your visit with us. Thank you!             Your Updated Medication List - Protect others around you: Learn how to safely use, store and throw away your medicines at www.disposemymeds.org.          This list is accurate as of 3/14/18 12:17 PM.  Always use your most recent med list.                   Brand Name Dispense Instructions for use Diagnosis    ascorbic acid 1000 MG Tabs    vitamin C     Take 1,000 mg by mouth daily        ASTEPRO NA           atenolol 50 MG tablet    TENORMIN    180 tablet    Take 1 tablet (50 mg) by mouth 2 times daily    Essential hypertension       BIOTIN PO      Take 1,000 mcg by mouth        blood glucose monitoring test strip    no brand specified    1 Box    Use to test blood sugar 1 times daily or as directed.    Type 2 diabetes mellitus with other specified complication (H)       clotrimazole 1 % cream     LOTRIMIN     Apply topically daily        cycloSPORINE 0.05 % ophthalmic emulsion    RESTASIS     1 drop 2 times daily        diphenhydrAMINE-acetaminophen  MG tablet    TYLENOL PM     Take 1 tablet by mouth At Bedtime Reported on 3/20/2017        famotidine 40 MG tablet    PEPCID     Take 40 mg by mouth At Bedtime        fenofibrate 145 MG tablet     90 tablet    Take 1 tablet (145 mg) by mouth daily    CARDIOVASCULAR SCREENING; LDL GOAL LESS THAN 130       ferrous sulfate 325 (65 FE) MG tablet    IRON     Take 325 mg by mouth daily (with breakfast)        fexofenadine 180 MG tablet    ALLEGRA    120    Take 180 mg by mouth daily.        fluticasone 50 MCG/ACT spray    FLONASE    1 Bottle    Spray 2 sprays in nostril daily 2 sprays in each nostril qd    Seasonal allergic rhinitis due to other allergic trigger       gabapentin 100 MG capsule    NEURONTIN    90 capsule    Take 1 capsule (100 mg) by mouth every evening as needed    Poliomyelitis       hydrochlorothiazide 25 MG tablet    HYDRODIURIL    90 tablet    Take 1 tablet (25 mg) by mouth daily    Essential hypertension       lisinopril 20 MG tablet    PRINIVIL/ZESTRIL    90 tablet    Take 1 tablet (20 mg) by mouth daily    Essential hypertension       metFORMIN 500 MG tablet    GLUCOPHAGE     Take 500 mg by mouth daily (with dinner)        metoclopramide 10 MG tablet    REGLAN     Take 1-2 tabs as needed        multivitamin per tablet     100    ONE DAILY        nitroGLYcerin 0.4 MG sublingual tablet    NITROSTAT    25 tablet    Place 1 tablet (0.4 mg) under the tongue every 5 minutes as needed for chest pain (no more than 3 in one hour; after 3rd, call 911.)    Atypical chest pain       nystatin cream    MYCOSTATIN     Apply topically daily as needed        nystatin-triamcinolone cream    MYCOLOG II     Apply topically daily as needed        ondansetron 4 MG tablet    ZOFRAN     Take 1 tablet by mouth every 6 hours as needed Reported on 3/20/2017         * order for DME     1 Units    Equipment being ordered: Oral appliance for sleep apnea    ANNETTE (obstructive sleep apnea)       * order for DME     1 each    Donut Pillow    Coccydynia       * order for DME     2 each    Equipment being ordered: Compression stockings - Knee High; 20-30 mmHg compression    Insufficiency, arterial, peripheral (H)       pantoprazole 40 MG EC tablet    PROTONIX     Take 40 mg by mouth 2 times daily        rosuvastatin 10 MG tablet    CRESTOR    30 tablet    Take 1 tablet (10 mg) by mouth daily    Hyperlipidemia LDL goal <100       SB LOW DOSE ASA EC 81 MG EC tablet   Generic drug:  aspirin      Take 81 mg by mouth daily        sitagliptin 50 MG tablet    JANUVIA     Take 50 mg by mouth daily        SYNTHROID 112 MCG tablet   Generic drug:  levothyroxine      Take 112 mcg by mouth daily Take 112 mcg daily except for Friday takes an additional 56 mcg.  Brand name Synthroid        TOUJEO SOLOSTAR 300 UNIT/ML injection   Generic drug:  insulin glargine U-300      Inject 30 Units Subcutaneous every morning Took 14 units this am        triamcinolone 0.1 % cream    KENALOG     Apply topically daily        VITAMIN D-3 PO      Take 2 tablets by mouth        * Notice:  This list has 3 medication(s) that are the same as other medications prescribed for you. Read the directions carefully, and ask your doctor or other care provider to review them with you.

## 2018-03-14 NOTE — NURSING NOTE
"Chief Complaint   Patient presents with     Pre-Op Exam       Initial /66  Pulse 60  Temp 97.7  F (36.5  C) (Oral)  Ht 5' 1.5\" (1.562 m)  Wt 200 lb (90.7 kg)  SpO2 100%  Breastfeeding? No  BMI 37.18 kg/m2 Estimated body mass index is 37.18 kg/(m^2) as calculated from the following:    Height as of this encounter: 5' 1.5\" (1.562 m).    Weight as of this encounter: 200 lb (90.7 kg).  Medication Reconciliation: complete   Ricarda Velasco CMA      "

## 2018-03-15 LAB
ANION GAP SERPL CALCULATED.3IONS-SCNC: 9 MMOL/L (ref 3–14)
BUN SERPL-MCNC: 23 MG/DL (ref 7–30)
CALCIUM SERPL-MCNC: 9.3 MG/DL (ref 8.5–10.1)
CHLORIDE SERPL-SCNC: 105 MMOL/L (ref 94–109)
CO2 SERPL-SCNC: 27 MMOL/L (ref 20–32)
CREAT SERPL-MCNC: 1.17 MG/DL (ref 0.52–1.04)
GFR SERPL CREATININE-BSD FRML MDRD: 46 ML/MIN/1.7M2
GLUCOSE SERPL-MCNC: 131 MG/DL (ref 70–99)
POTASSIUM SERPL-SCNC: 4.3 MMOL/L (ref 3.4–5.3)
SODIUM SERPL-SCNC: 141 MMOL/L (ref 133–144)

## 2018-03-15 NOTE — PROGRESS NOTES
Gerard Gaspar,    I have had the opportunity to review your recent results and an interpretation is as follows:  Your hemoglobin has improved slightly and is OK for surgery - I would still recommend a follow up in gastroenterology prior to surgery to discuss     Sincerely,  Shola Benoit MD

## 2018-03-20 ENCOUNTER — TRANSFERRED RECORDS (OUTPATIENT)
Dept: HEALTH INFORMATION MANAGEMENT | Facility: CLINIC | Age: 73
End: 2018-03-20

## 2018-03-31 ENCOUNTER — OFFICE VISIT (OUTPATIENT)
Dept: URGENT CARE | Facility: URGENT CARE | Age: 73
End: 2018-03-31
Payer: MEDICARE

## 2018-03-31 VITALS
SYSTOLIC BLOOD PRESSURE: 141 MMHG | BODY MASS INDEX: 38.29 KG/M2 | OXYGEN SATURATION: 97 % | DIASTOLIC BLOOD PRESSURE: 57 MMHG | HEART RATE: 64 BPM | WEIGHT: 206 LBS | TEMPERATURE: 97.9 F

## 2018-03-31 DIAGNOSIS — L03.116 CELLULITIS OF LEFT LOWER EXTREMITY: Primary | ICD-10-CM

## 2018-03-31 DIAGNOSIS — E11.69 TYPE 2 DIABETES MELLITUS WITH OTHER SPECIFIED COMPLICATION, WITHOUT LONG-TERM CURRENT USE OF INSULIN (H): ICD-10-CM

## 2018-03-31 DIAGNOSIS — I73.9 INSUFFICIENCY, ARTERIAL, PERIPHERAL (H): ICD-10-CM

## 2018-03-31 PROCEDURE — 99214 OFFICE O/P EST MOD 30 MIN: CPT | Performed by: PHYSICIAN ASSISTANT

## 2018-03-31 RX ORDER — CEPHALEXIN 500 MG/1
500 CAPSULE ORAL 4 TIMES DAILY
Qty: 40 CAPSULE | Refills: 0 | Status: SHIPPED | OUTPATIENT
Start: 2018-03-31 | End: 2018-04-10

## 2018-03-31 ASSESSMENT — ENCOUNTER SYMPTOMS
VOMITING: 0
FEVER: 0
DIARRHEA: 0
CHILLS: 0
NAUSEA: 0
FOCAL WEAKNESS: 0
ABDOMINAL PAIN: 0
HEADACHES: 0
SHORTNESS OF BREATH: 0

## 2018-03-31 NOTE — PROGRESS NOTES
HPI  March 31, 2018    HPI: Chasity Hodgson is a 72 year old female with hx Type 2 DM and PAD who complains of moderate swelling, erythema, & tenderness to LLE onset 2 days ago. Symptoms began after she ran into something and sustained a small cut to her leg. She is scheduled to have R total shoulder replacement on 4/4/18, and wants to be sure she doesn't have an infection that would prevent her from having surgery. Symptoms are constant in duration. Denies fever/chills, drainage, numbness, CP, SOB, myalgias, or any other symptoms.     A1c was 6.5% on 2/12/18.    Past Medical History:   Diagnosis Date     Abdominal adhesions 1984, 96,99    s/p lysis     Allergic rhinitis, cause unspecified      Carotid stenosis      CPAP (continuous positive airway pressure) dependence      Diabetic gastroparesis (H)      Diet-controlled type 2 diabetes mellitus (H)      DVT of axillary vein, acute right (H)      Fibromyalgia      Gastro-oesophageal reflux disease      Hernia of unspecified site of abdominal cavity without mention of obstruction or gangrene     bilateral     HTN (hypertension)      Hypertriglyceridemia      Obstructive sleep apnea      ANNETTE (obstructive sleep apnea)      Papillary carcinoma of thyroid (H)     s/p thyroidectomy - Ruegemer     PE (pulmonary embolism)      Poliomyelitis     poor circulation right leg     Postsurgical hypothyroidism     s/p papillary thryoid cancer - Ruegemer     Pulmonary embolism (H)      Rosacea      S/P carpal tunnel release     bilateral     S/P hardware removal 01/2014    still with lingering foot pain     S/P shoulder surgery     bilateral     Septic joint (H)     right knee     Venous insufficiency      Venous thrombosis 1999    right axillary vein     Past Surgical History:   Procedure Laterality Date     AMPUTATE TOE(S)  3/15/2012    Procedure:AMPUTATE TOE(S); Surgeon:RUBEN MOSES; Location:[facility] OR     APPENDECTOMY  1972     ARTHRODESIS FOOT  3/15/2012     Procedure:ARTHRODESIS FOOT; RIGHT TRIPLE ARTHRODESIS, FIFTH TOE AMPUTATION, LATERAL LIGAMENT RECONSTRUCTION, TENDON TRANSFER AND RELEASE [MINI C-ARM, ARTHREX 4.5 AND 6.7 STAPLES, BIOCOMPOSITE TENODESIS SCREWS]; Surgeon:SUKHDEEP METZ; Location: OR     C FREEING BOWEL ADHESION,ENTEROLYSIS      1986, 1996, 1999     C NONSPECIFIC PROCEDURE      throidectomy     C TOTAL ABDOM HYSTERECTOMY  1980    + BSO     CHOLECYSTECTOMY       COLOSTOMY  2/7/2012    Procedure:COLOSTOMY; CREATION OF SIGMOID COLOSTOMY AND EXTENSIVE  LYSIS OF ADHESIONS; Surgeon:MONTSERRAT BENDER; Location: OR     GI SURGERY      weakened rectal sphincter with artificial stimulator     LAPAROTOMY, LYSIS ADHESIONS, COMBINED  2/7/2012    Procedure:COMBINED LAPAROTOMY, LYSIS ADHESIONS; Surgeon:MONTSERRAT BENDER; Location: OR     RELEASE TENDON FOOT  3/15/2012    Procedure:RELEASE TENDON FOOT; Surgeon:SUKHDEEP METZ; Location: OR     REMOVE HARDWARE FOOT  12/13/2012    Procedure: REMOVE HARDWARE FOOT;  RIGHT FOOT REMOVAL OF HARDWARE;  Surgeon: Sukhdeep Metz MD;  Location:  OR     Social History   Substance Use Topics     Smoking status: Never Smoker     Smokeless tobacco: Never Used     Alcohol use No      Comment: very rare     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)     Anemia due to blood loss, acute     Advanced directives, counseling/discussion     Postsurgical hypothyroidism     Type 2 diabetes, HbA1c goal < 7% (H)     Gastroparesis     Papillary carcinoma of thyroid (H)     Alopecia     Pulmonary nodule     Esophageal reflux     Dermatitis     Acne rosacea     Diabetic gastroparesis (H)     Poliomyelitis     Left knee pain     Carotid stenosis      Right shoulder pain     Type 2 diabetes mellitus with other specified complication (H)     Insufficiency, arterial, peripheral (H)     Allergic dermatitis due to other chemical product     Normocytic anemia     Family History   Problem Relation Age of Onset     Arthritis Mother      Hypertension Mother      CEREBROVASCULAR DISEASE Mother      Obesity Mother      HEART DISEASE Mother      MI's     Hypertension Father      Respiratory Father      Adult RDS     DIABETES Father      adult     Arthritis Sister      CANCER Sister      Arthritis Sister      Hypertension Sister      CANCER Sister      colon polup     HEART DISEASE Brother      MI at 54     Hypertension Sister      Lipids Brother      Hypertension Brother      Lipids Sister      Obesity Sister      Obesity Maternal Grandmother      Skin Cancer Maternal Grandmother      skin cancer unknown     CANCER Maternal Grandmother      unknown skin cancer on face        Problem list, Medication list, Allergies, and Medical/Social/Surgical histories reviewed in [facility] and updated as appropriate.      Review of Systems   Constitutional: Negative for chills and fever.   Respiratory: Negative for shortness of breath.    Cardiovascular: Negative for chest pain.   Gastrointestinal: Negative for abdominal pain, diarrhea, nausea and vomiting.   Skin: Negative for rash.        Erythema, tenderness LLE   Neurological: Negative for focal weakness and headaches.   All other systems reviewed and are negative.        Physical Exam   Constitutional: She is oriented to person, place, and time and well-developed, well-nourished, and in no distress.   HENT:   Head: Normocephalic and atraumatic.   Cardiovascular: Normal rate, regular rhythm and normal heart sounds.    Pulmonary/Chest: Effort normal and breath sounds normal.   Musculoskeletal: Normal range of motion.        Left lower leg: She exhibits tenderness and swelling.        Legs:  Neurological: She is alert and oriented to person,  place, and time. Gait normal.   Skin: Skin is warm and dry. Laceration noted. There is erythema.   Nursing note and vitals reviewed.      Vital Signs  /57 (BP Location: Left arm, Patient Position: Chair, Cuff Size: Adult Large)  Pulse 64  Temp 97.9  F (36.6  C) (Oral)  Wt 206 lb (93.4 kg)  SpO2 97%  Breastfeeding? No  BMI 38.29 kg/m2     Diagnostic Test Results:  none     ASSESSMENT/PLAN      ICD-10-CM    1. Cellulitis of left lower extremity L03.116 cephALEXin (KEFLEX) 500 MG capsule   2. Type 2 diabetes mellitus with other specified complication, without long-term current use of insulin (H) E11.69    3. Insufficiency, arterial, peripheral (H) I73.9       Mild cellulitis surrounding wound on LLE. Rx Keflex. Also advised elevation. If no improvement in 48 hours, f/u with PCP or come into urgent care. Informed pt if infection is not better by the date of her surgery, they might not proceed so she should keep a close eye on the area.    DM well controlled.    I have discussed any lab or imaging results, the patient's diagnosis, and my plan of treatment with the patient and/or family. Patient is aware to come back in if with worsening symptoms or if no relief despite treatment plan.  Patient voiced understanding and had no further questions.       Follow Up: Data Unavailable    YULIYA Estrada, PA-C  [facility]

## 2018-03-31 NOTE — MR AVS SNAPSHOT
After Visit Summary   3/31/2018    Chasity Hodgson    MRN: [id number]           Patient Information     Date Of Birth          1945        Visit Information        Provider Department      3/31/2018 3:25 PM Jessica Dawson PA-C [facility] Ur[facility]        Today's Diagnoses     Cellulitis of left lower extremity    -  1    Type 2 diabetes mellitus with other specified complication, without long-term current use of insulin (H)        Insufficiency, arterial, peripheral (H)           Follow-ups after your visit        Your next 10 appointments already scheduled     Jun 04, 2018 10:50 AM CDT   (Arrive by 10:35 AM)   RETURN HAIRLOSS with Renetta Meeyr MD   [facility] Dermatology ([facility])    [address]  3rd Floor  [facility] [address] [phone]            Jun 27, 2018  9:15 AM CDT   Return Visit with Andreea Ortiz MD   [facility] ([facility])    [address] [place] [address] [address]  [facility] [id number] [phone] OPT 2              Who to contact     If you have questions or need follow up information about today's clinic visit or your schedule please contact [facility] URGENT CARE directly at [phone].  Normal or non-critical lab and imaging results will be communicated to you by MyChart, letter or phone within 4 business days after the clinic has received the results. If you do not hear from us within 7 days, please contact the clinic through MyChart or phone. If you have a critical or abnormal lab result, we will notify you by phone as soon as possible.  Submit refill requests through [employer] or call your pharmacy and they will forward the refill request to us. Please allow 3 business days for your refill to be completed.          Additional Information About Your Visit        [employer] Information     [employer] gives you secure access to your electronic  health record. If you see a primary care provider, you can also send messages to your care team and make appointments. If you have questions, please call your primary care clinic.  If you do not have a primary care provider, please call [phone] and they will assist you.        Care EveryWhere ID     This is your Care EveryWhere ID. This could be used by other organizations to access your [place] medical records  [id number]        Your Vitals Were     Pulse Temperature Pulse Oximetry Breastfeeding? BMI (Body Mass Index)       64 97.9  F (36.6  C) (Oral) 97% No 38.29 kg/m2        Blood Pressure from Last 3 Encounters:   03/31/18 141/57   03/14/18 122/66   03/07/18 124/56    Weight from Last 3 Encounters:   03/31/18 206 lb (93.4 kg)   03/14/18 200 lb (90.7 kg)   03/07/18 204 lb 6.4 oz (92.7 kg)              Today, you had the following     No orders found for display         Today's Medication Changes          These changes are accurate as of 3/31/18  3:50 PM.  If you have any questions, ask your nurse or doctor.               Start taking these medicines.        Dose/Directions    cephALEXin 500 MG capsule   Commonly known as:  KEFLEX   Used for:  Cellulitis of left lower extremity   Started by:  Jessica Dawson PA-C        Dose:  500 mg   Take 1 capsule (500 mg) by mouth 4 times daily for 10 days   Quantity:  40 capsule   Refills:  0            Where to get your medicines      These medications were sent to [facility] [address] [place] [address]  [address] [facility] [address]    Hours:  24-hours Phone:  [phone]     cephALEXin 500 MG capsule                Primary Care Provider Office Phone # Fax #    Shola Benoit [phone] [address] [facility] [address]  [facility] [address]        Equal Access to Services     CORINNE ROBB AH: Hadii domi Olea, nicole izaguirre, naz cordero,  jose rauschjosé miguel draper'aan ah. So [facility] [phone].    ATENCIÓN: Si santos miller, tiene a modi disposición servicios gratuitos de asistencia lingüística. Romaine carr [phone].    We comply with applicable federal civil rights laws and [place] laws. We do not discriminate on the basis of race, color, national origin, age, disability, sex, sexual orientation, or gender identity.            Thank you!     Thank you for choosing [facility] URGENT CARE  for your care. Our goal is always to provide you with excellent care. Hearing back from our patients is one way we can continue to improve our services. Please take a few minutes to complete the written survey that you may receive in the mail after your visit with us. Thank you!             Your Updated Medication List - Protect others around you: Learn how to safely use, store and throw away your medicines at www.disposemymeds.org.          This list is accurate as of 3/31/18  3:50 PM.  Always use your most recent med list.                   Brand Name Dispense Instructions for use Diagnosis    ascorbic acid 1000 MG Tabs    vitamin C     Take 1,000 mg by mouth daily        ASTEPRO NA           atenolol 50 MG tablet    TENORMIN    180 tablet    Take 1 tablet (50 mg) by mouth 2 times daily    Essential hypertension       BIOTIN PO      Take 1,000 mcg by mouth        blood glucose monitoring test strip    no brand specified    1 Box    Use to test blood sugar 1 times daily or as directed.    Type 2 diabetes mellitus with other specified complication (H)       cephALEXin 500 MG capsule    KEFLEX    40 capsule    Take 1 capsule (500 mg) by mouth 4 times daily for 10 days    Cellulitis of left lower extremity       clotrimazole 1 % cream    LOTRIMIN     Apply topically daily        cycloSPORINE 0.05 % ophthalmic emulsion    RESTASIS     1 drop 2 times daily        diphenhydrAMINE-acetaminophen  MG tablet    TYLENOL PM     Take 1 tablet by mouth  At Bedtime Reported on 3/20/2017        famotidine 40 MG tablet    PEPCID     Take 40 mg by mouth At Bedtime        fenofibrate 145 MG tablet     90 tablet    Take 1 tablet (145 mg) by mouth daily    CARDIOVASCULAR SCREENING; LDL GOAL LESS THAN 130       ferrous sulfate 325 (65 FE) MG tablet    IRON     Take 325 mg by mouth daily (with breakfast)        fexofenadine 180 MG tablet    ALLEGRA    120    Take 180 mg by mouth daily.        fluticasone 50 MCG/ACT spray    FLONASE    1 Bottle    Spray 2 sprays in nostril daily 2 sprays in each nostril qd    Seasonal allergic rhinitis due to other allergic trigger       gabapentin 100 MG capsule    NEURONTIN    90 capsule    Take 1 capsule (100 mg) by mouth every evening as needed    Poliomyelitis       hydrochlorothiazide 25 MG tablet    HYDRODIURIL    90 tablet    Take 1 tablet (25 mg) by mouth daily    Essential hypertension       lisinopril 20 MG tablet    PRINIVIL/ZESTRIL    90 tablet    Take 1 tablet (20 mg) by mouth daily    Essential hypertension       metFORMIN 500 MG tablet    GLUCOPHAGE     Take 500 mg by mouth daily (with dinner)        metoclopramide 10 MG tablet    REGLAN     Take 1-2 tabs as needed        multivitamin per tablet     100    ONE DAILY        nitroGLYcerin 0.4 MG sublingual tablet    NITROSTAT    25 tablet    Place 1 tablet (0.4 mg) under the tongue every 5 minutes as needed for chest pain (no more than 3 in one hour; after 3rd, call 911.)    Atypical chest pain       nystatin cream    MYCOSTATIN     Apply topically daily as needed        nystatin-triamcinolone cream    MYCOLOG II     Apply topically daily as needed        ondansetron 4 MG tablet    ZOFRAN     Take 1 tablet by mouth every 6 hours as needed Reported on 3/20/2017        * order for DME     1 Units    Equipment being ordered: Oral appliance for sleep apnea    ANNETTE (obstructive sleep apnea)       * order for DME     1 each    Donut Pillow    Coccydynia       * order for DME     2  each    Equipment being ordered: Compression stockings - Knee High; 20-30 mmHg compression    Insufficiency, arterial, peripheral (H)       pantoprazole 40 MG EC tablet    PROTONIX     Take 40 mg by mouth 2 times daily        rosuvastatin 10 MG tablet    CRESTOR    30 tablet    Take 1 tablet (10 mg) by mouth daily    Hyperlipidemia LDL goal <100       SB LOW DOSE ASA EC 81 MG EC tablet   Generic drug:  aspirin      Take 81 mg by mouth daily        sitagliptin 50 MG tablet    JANUVIA     Take 50 mg by mouth daily        SYNTHROID 112 MCG tablet   Generic drug:  levothyroxine      Take 112 mcg by mouth daily Take 112 mcg daily except for Friday takes an additional 56 mcg.  Brand name Synthroid        TOUJEO SOLOSTAR 300 UNIT/ML injection   Generic drug:  insulin glargine U-300      Inject 30 Units Subcutaneous every morning Took 14 units this am        triamcinolone 0.1 % cream    KENALOG     Apply topically daily        VITAMIN D-3 PO      Take 2 tablets by mouth        * Notice:  This list has 3 medication(s) that are the same as other medications prescribed for you. Read the directions carefully, and ask your doctor or other care provider to review them with you.

## 2018-03-31 NOTE — NURSING NOTE
"Chief Complaint   Patient presents with     Urgent Care     Laceration     x 2 days left leg       Initial /57 (BP Location: Left arm, Patient Position: Chair, Cuff Size: Adult Large)  Pulse 64  Temp 97.9  F (36.6  C) (Oral)  Wt 206 lb (93.4 kg)  SpO2 97%  Breastfeeding? No  BMI 38.29 kg/m2 Estimated body mass index is 38.29 kg/(m^2) as calculated from the following:    Height as of 3/14/18: 5' 1.5\" (1.562 m).    Weight as of this encounter: 206 lb (93.4 kg).  Medication Reconciliation: complete     Marbella Colindres MA   "

## 2018-04-02 ENCOUNTER — OFFICE VISIT (OUTPATIENT)
Dept: URGENT CARE | Facility: URGENT CARE | Age: 73
End: 2018-04-02
Payer: MEDICARE

## 2018-04-02 VITALS
DIASTOLIC BLOOD PRESSURE: 60 MMHG | TEMPERATURE: 97.5 F | SYSTOLIC BLOOD PRESSURE: 142 MMHG | HEART RATE: 66 BPM | BODY MASS INDEX: 38.11 KG/M2 | WEIGHT: 205 LBS | OXYGEN SATURATION: 98 %

## 2018-04-02 DIAGNOSIS — L03.116 CELLULITIS OF LEFT LOWER EXTREMITY: Primary | ICD-10-CM

## 2018-04-02 PROCEDURE — 99213 OFFICE O/P EST LOW 20 MIN: CPT

## 2018-04-02 NOTE — MR AVS SNAPSHOT
After Visit Summary   4/2/2018    Chasity Hodgson    MRN: [id number]           Patient Information     Date Of Birth          1945        Visit Information        Provider Department      4/2/2018 8:30 PM [facility] URGENT CARE [facility] Urgent [facility]        Today's Diagnoses     Cellulitis of left lower extremity    -  1       Follow-ups after your visit        Follow-up notes from your care team     Return if symptoms worsen or fail to improve.      Your next 10 appointments already scheduled     Jun 04, 2018 10:50 AM CDT   (Arrive by 10:35 AM)   RETURN HAIRLOSS with Renetta Meyer MD   [facility] Dermatology ([facility])    [address]  3rd Floor  [facility] [id number] [phone]            Jun 27, 2018  9:15 AM CDT   Return Visit with Andreea Ortiz MD   [facility] ([facility])    [address] [place], [place] [address]  [facility] [id number] [phone] OPT 2              Who to contact     If you have questions or need follow up information about today's clinic visit or your schedule please contact [facility] URGENT CARE directly at [phone].  Normal or non-critical lab and imaging results will be communicated to you by MyChart, letter or phone within 4 business days after the clinic has received the results. If you do not hear from us within 7 days, please contact the clinic through MyChart or phone. If you have a critical or abnormal lab result, we will notify you by phone as soon as possible.  Submit refill requests through [employer] or call your pharmacy and they will forward the refill request to us. Please allow 3 business days for your refill to be completed.          Additional Information About Your Visit        MyChart Information     [employer] gives you secure access to your electronic health record. If you see a primary care provider, you can also send messages to your  care team and make appointments. If you have questions, please call your primary care clinic.  If you do not have a primary care provider, please call [phone] and they will assist you.        Care EveryWhere ID     This is your Care EveryWhere ID. This could be used by other organizations to access your [place] medical records  [id number]        Your Vitals Were     Pulse Temperature Pulse Oximetry Breastfeeding? BMI (Body Mass Index)       66 97.5  F (36.4  C) (Oral) 98% No 38.11 kg/m2        Blood Pressure from Last 3 Encounters:   04/02/18 142/60   03/31/18 141/57   03/14/18 122/66    Weight from Last 3 Encounters:   04/02/18 205 lb (93 kg)   03/31/18 206 lb (93.4 kg)   03/14/18 200 lb (90.7 kg)              Today, you had the following     No orders found for display       Primary Care Provider Office Phone # Fax #    Shola Benoit [phone] [address] [facility] [address]  [place] [address]        Equal Access to Services     [facility]: Hadii aad ku hadasho Sosavitaali, waaxda luqadaha, qaybta kaalmada adelamberto, jose baldwin . So [facility] [phone].    ATENCIÓN: Si habla español, tiene a modi disposición servicios gratuitos de asistencia lingüística. Romaine al [phone].    We comply with applicable federal civil rights laws and [place] laws. We do not discriminate on the basis of race, color, national origin, age, disability, sex, sexual orientation, or gender identity.            Thank you!     Thank you for choosing [facility] URGENT CARE  for your care. Our goal is always to provide you with excellent care. Hearing back from our patients is one way we can continue to improve our services. Please take a few minutes to complete the written survey that you may receive in the mail after your visit with us. Thank you!             Your Updated Medication List - Protect others around you: Learn how to safely use, store and throw away your  medicines at www.disposemymeds.org.          This list is accurate as of 4/2/18 11:59 PM.  Always use your most recent med list.                   Brand Name Dispense Instructions for use Diagnosis    ascorbic acid 1000 MG Tabs    vitamin C     Take 1,000 mg by mouth daily        ASTEPRO NA           atenolol 50 MG tablet    TENORMIN    180 tablet    Take 1 tablet (50 mg) by mouth 2 times daily    Essential hypertension       BIOTIN PO      Take 1,000 mcg by mouth        blood glucose monitoring test strip    no brand specified    1 Box    Use to test blood sugar 1 times daily or as directed.    Type 2 diabetes mellitus with other specified complication (H)       cephALEXin 500 MG capsule    KEFLEX    40 capsule    Take 1 capsule (500 mg) by mouth 4 times daily for 10 days    Cellulitis of left lower extremity       clotrimazole 1 % cream    LOTRIMIN     Apply topically daily        cycloSPORINE 0.05 % ophthalmic emulsion    RESTASIS     1 drop 2 times daily        diphenhydrAMINE-acetaminophen  MG tablet    TYLENOL PM     Take 1 tablet by mouth At Bedtime Reported on 3/20/2017        famotidine 40 MG tablet    PEPCID     Take 40 mg by mouth At Bedtime        fenofibrate 145 MG tablet     90 tablet    Take 1 tablet (145 mg) by mouth daily    CARDIOVASCULAR SCREENING; LDL GOAL LESS THAN 130       ferrous sulfate 325 (65 FE) MG tablet    IRON     Take 325 mg by mouth daily (with breakfast)        fexofenadine 180 MG tablet    ALLEGRA    120    Take 180 mg by mouth daily.        fluticasone 50 MCG/ACT spray    FLONASE    1 Bottle    Spray 2 sprays in nostril daily 2 sprays in each nostril qd    Seasonal allergic rhinitis due to other allergic trigger       gabapentin 100 MG capsule    NEURONTIN    90 capsule    Take 1 capsule (100 mg) by mouth every evening as needed    Poliomyelitis       hydrochlorothiazide 25 MG tablet    HYDRODIURIL    90 tablet    Take 1 tablet (25 mg) by mouth daily    Essential  hypertension       lisinopril 20 MG tablet    PRINIVIL/ZESTRIL    90 tablet    Take 1 tablet (20 mg) by mouth daily    Essential hypertension       metFORMIN 500 MG tablet    GLUCOPHAGE     Take 500 mg by mouth daily (with dinner)        metoclopramide 10 MG tablet    REGLAN     Take 1-2 tabs as needed        multivitamin per tablet     100    ONE DAILY        nitroGLYcerin 0.4 MG sublingual tablet    NITROSTAT    25 tablet    Place 1 tablet (0.4 mg) under the tongue every 5 minutes as needed for chest pain (no more than 3 in one hour; after 3rd, call 911.)    Atypical chest pain       nystatin cream    MYCOSTATIN     Apply topically daily as needed        nystatin-triamcinolone cream    MYCOLOG II     Apply topically daily as needed        ondansetron 4 MG tablet    ZOFRAN     Take 1 tablet by mouth every 6 hours as needed Reported on 3/20/2017        * order for DME     1 Units    Equipment being ordered: Oral appliance for sleep apnea    ANNETTE (obstructive sleep apnea)       * order for DME     1 each    Donut Pillow    Coccydynia       * order for DME     2 each    Equipment being ordered: Compression stockings - Knee High; 20-30 mmHg compression    Insufficiency, arterial, peripheral (H)       pantoprazole 40 MG EC tablet    PROTONIX     Take 40 mg by mouth 2 times daily        rosuvastatin 10 MG tablet    CRESTOR    30 tablet    Take 1 tablet (10 mg) by mouth daily    Hyperlipidemia LDL goal <100       SB LOW DOSE ASA EC 81 MG EC tablet   Generic drug:  aspirin      Take 81 mg by mouth daily        sitagliptin 50 MG tablet    JANUVIA     Take 50 mg by mouth daily        SYNTHROID 112 MCG tablet   Generic drug:  levothyroxine      Take 112 mcg by mouth daily Take 112 mcg daily except for Friday takes an additional 56 mcg.  Brand name Synthroid        TOUJEO SOLOSTAR 300 UNIT/ML injection   Generic drug:  insulin glargine U-300      Inject 30 Units Subcutaneous every morning Took 14 units this am         triamcinolone 0.1 % cream    KENALOG     Apply topically daily        VITAMIN D-3 PO      Take 2 tablets by mouth        * Notice:  This list has 3 medication(s) that are the same as other medications prescribed for you. Read the directions carefully, and ask your doctor or other care provider to review them with you.

## 2018-04-03 ENCOUNTER — TELEPHONE (OUTPATIENT)
Dept: FAMILY MEDICINE | Facility: CLINIC | Age: 73
End: 2018-04-03

## 2018-04-03 NOTE — TELEPHONE ENCOUNTER
Reason for Call:  Other labs    Detailed comments: Please fax BMP lab from 3/14/18 pre op to [facility] at [phone]. They received all other info.    Call taken on 4/3/2018 at 1:06 PM by Court Luz

## 2018-04-03 NOTE — PROGRESS NOTES
SUBJECTIVE:   Chasity Hodgson is a 72 year old female who presents to clinic today for the following health issues:    HPI     Presents for recheck tonite of a very mild LLE cellulitis patient noted after sustaining a small cut to the LLE lateral edge.  She presented to  on 3- and was started on Keflex.  She has shoulder replacement surgery on W this week and presents tonite for a recheck worried she will have her surgery cancelled if this infection does not clear.    She notes continued improvement of site.  There is not drainage, pain, or streaking.  There is very mild swelling and redness at the small site measuring < 1cm.    Problem list and histories reviewed & adjusted, as indicated.  Additional history: as documented        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)     Anemia due to blood loss, acute     Advanced directives, counseling/discussion     Postsurgical hypothyroidism     Type 2 diabetes, HbA1c goal < 7% (H)     Gastroparesis     Papillary carcinoma of thyroid (H)     Alopecia     Pulmonary nodule     Esophageal reflux     Dermatitis     Acne rosacea     Diabetic gastroparesis (H)     Poliomyelitis     Left knee pain     Carotid stenosis     Right shoulder pain     Type 2 diabetes mellitus with other specified complication (H)     Insufficiency, arterial, peripheral (H)     Allergic dermatitis due to other chemical product     Normocytic anemia     Past Surgical History:   Procedure Laterality Date     AMPUTATE TOE(S)  3/15/2012    Procedure:AMPUTATE TOE(S); Surgeon:RUBEN MOSES; Location:[facility]     APPENDECTOMY  1972     ARTHRODESIS FOOT  3/15/2012    Procedure:ARTHRODESIS FOOT; RIGHT TRIPLE ARTHRODESIS, FIFTH TOE  AMPUTATION, LATERAL LIGAMENT RECONSTRUCTION, TENDON TRANSFER AND RELEASE [MINI C-ARM, ARTHREX 4.5 AND 6.7 STAPLES, BIOCOMPOSITE TENODESIS SCREWS]; Surgeon:SUKHDEEP MTEZ; Location: OR     C FREEING BOWEL ADHESION,ENTEROLYSIS      1986, 1996, 1999     C NONSPECIFIC PROCEDURE      throidectomy     C TOTAL ABDOM HYSTERECTOMY  1980    + BSO     CHOLECYSTECTOMY       COLOSTOMY  2/7/2012    Procedure:COLOSTOMY; CREATION OF SIGMOID COLOSTOMY AND EXTENSIVE  LYSIS OF ADHESIONS; Surgeon:MONTSERRAT BENDER; Location: OR     GI SURGERY      weakened rectal sphincter with artificial stimulator     LAPAROTOMY, LYSIS ADHESIONS, COMBINED  2/7/2012    Procedure:COMBINED LAPAROTOMY, LYSIS ADHESIONS; Surgeon:MONTSERRAT BENDER; Location: OR     RELEASE TENDON FOOT  3/15/2012    Procedure:RELEASE TENDON FOOT; Surgeon:SUKHDEEP METZ; Location: OR     REMOVE HARDWARE FOOT  12/13/2012    Procedure: REMOVE HARDWARE FOOT;  RIGHT FOOT REMOVAL OF HARDWARE;  Surgeon: Sukhdeep Metz MD;  Location:  OR       Social History   Substance Use Topics     Smoking status: Never Smoker     Smokeless tobacco: Never Used     Alcohol use No      Comment: very rare     Family History   Problem Relation Age of Onset     Arthritis Mother      Hypertension Mother      CEREBROVASCULAR DISEASE Mother      Obesity Mother      HEART DISEASE Mother      MI's     Hypertension Father      Respiratory Father      Adult RDS     DIABETES Father      adult     Arthritis Sister      CANCER Sister      Arthritis Sister      Hypertension Sister      CANCER Sister      colon polup     HEART DISEASE Brother      MI at 54     Hypertension Sister      Lipids Brother      Hypertension Brother      Lipids Sister      Obesity Sister      Obesity Maternal Grandmother      Skin Cancer Maternal Grandmother      skin cancer unknown     CANCER Maternal Grandmother      unknown skin cancer on face         Current Outpatient Prescriptions    Medication Sig Dispense Refill     cephALEXin (KEFLEX) 500 MG capsule Take 1 capsule (500 mg) by mouth 4 times daily for 10 days 40 capsule 0     rosuvastatin (CRESTOR) 10 MG tablet Take 1 tablet (10 mg) by mouth daily 30 tablet 11     ferrous sulfate (IRON) 325 (65 FE) MG tablet Take 325 mg by mouth daily (with breakfast)       atenolol (TENORMIN) 50 MG tablet Take 1 tablet (50 mg) by mouth 2 times daily 180 tablet 3     gabapentin (NEURONTIN) 100 MG capsule Take 1 capsule (100 mg) by mouth every evening as needed 90 capsule 3     metFORMIN (GLUCOPHAGE) 500 MG tablet Take 500 mg by mouth daily (with dinner)        hydrochlorothiazide (HYDRODIURIL) 25 MG tablet Take 1 tablet (25 mg) by mouth daily 90 tablet 3     pantoprazole (PROTONIX) 40 MG EC tablet Take 40 mg by mouth 2 times daily       insulin glargine U-300 (TOUJEO SOLOSTAR) 300 UNIT/ML injection Inject 30 Units Subcutaneous every morning Took 14 units this am       famotidine (PEPCID) 40 MG tablet Take 40 mg by mouth At Bedtime       Azelastine HCl (ASTEPRO NA)        triamcinolone (KENALOG) 0.1 % cream Apply topically daily       clotrimazole (LOTRIMIN) 1 % cream Apply topically daily       nitroGLYcerin (NITROSTAT) 0.4 MG sublingual tablet Place 1 tablet (0.4 mg) under the tongue every 5 minutes as needed for chest pain (no more than 3 in one hour; after 3rd, call 911.) 25 tablet 3     lisinopril (PRINIVIL/ZESTRIL) 20 MG tablet Take 1 tablet (20 mg) by mouth daily 90 tablet 2     fenofibrate 145 MG tablet Take 1 tablet (145 mg) by mouth daily 90 tablet 2     order for DME Equipment being ordered: Compression stockings - Knee High; 20-30 mmHg compression 2 each 0     order for DME Donut Pillow 1 each 0     metoclopramide (REGLAN) 10 MG tablet Take 1-2 tabs as needed       fluticasone (FLONASE) 50 MCG/ACT spray Spray 2 sprays in nostril daily 2 sprays in each nostril qd 1 Bottle 0     order for DME Equipment being ordered: Oral appliance for sleep apnea 1  Units 0     cycloSPORINE (RESTASIS) 0.05 % ophthalmic emulsion 1 drop 2 times daily       sitagliptin (JANUVIA) 50 MG tablet Take 50 mg by mouth daily       Cholecalciferol (VITAMIN D-3 PO) Take 2 tablets by mouth       BIOTIN PO Take 1,000 mcg by mouth       blood glucose monitoring (NO BRAND SPECIFIED) test strip Use to test blood sugar 1 times daily or as directed. 1 Box 6     nystatin-triamcinolone (MYCOLOG II) cream Apply topically daily as needed       ascorbic acid (VITAMIN C) 1000 MG TABS Take 1,000 mg by mouth daily       ondansetron (ZOFRAN) 4 MG tablet Take 1 tablet by mouth every 6 hours as needed Reported on 3/20/2017       aspirin (SB LOW DOSE ASA EC) 81 MG EC tablet Take 81 mg by mouth daily       nystatin (MYCOSTATIN) cream Apply topically daily as needed        levothyroxine (SYNTHROID) 112 MCG tablet Take 112 mcg by mouth daily Take 112 mcg daily except for Friday takes an additional 56 mcg.  Brand name Synthroid       diphenhydrAMINE-acetaminophen (TYLENOL PM)  MG tablet Take 1 tablet by mouth At Bedtime Reported on 3/20/2017       fexofenadine (ALLEGRA) 180 MG tablet Take 180 mg by mouth daily. 120 0     MULTIVITAMINS OR TABS ONE DAILY 100 3     Allergies   Allergen Reactions     Nsaids Difficulty breathing     Increased creatinine     Toradol Difficulty breathing     Shortness of breath     Celecoxib Itching and Rash     Codeine Itching     With higher doses     Crestor [Rosuvastatin] Muscle Pain (Myalgia)     No Clinical Screening - See Comments Itching     Fragrance     Vioxx Other (See Comments)     Heart races     Conjugated Estrogens Rash     Sulfa Drugs Rash     BP Readings from Last 3 Encounters:   04/02/18 142/60   03/31/18 141/57   03/14/18 122/66    Wt Readings from Last 3 Encounters:   04/02/18 205 lb (93 kg)   03/31/18 206 lb (93.4 kg)   03/14/18 200 lb (90.7 kg)                    ROS:  Constitutional, HEENT, cardiovascular, pulmonary, gi and gu systems are negative, except as  otherwise noted.    OBJECTIVE:     /60 (BP Location: Left arm, Patient Position: Chair, Cuff Size: Adult Large)  Pulse 66  Temp 97.5  F (36.4  C) (Oral)  Wt 205 lb (93 kg)  SpO2 98%  Breastfeeding? No  BMI 38.11 kg/m2  Body mass index is 38.11 kg/(m^2).  GENERAL: healthy, alert and no distress  EYES: Eyes grossly normal to inspection, PERRL and conjunctivae and sclerae normal  NECK: no adenopathy, no asymmetry, masses, or scars and thyroid normal to palpation  SKIN: <1cm area of redness underlying 2mm scab on LEFT LE lateral edge of calf.  Mildly tender.  No streaking.  PSYCH: mentation appears normal, affect normal/bright    ASSESSMENT/PLAN:       1. Cellulitis of left lower extremity    Reassured infection is almost healed and should not interfere with surgery on W.  Continue to elevate leg while off tomorrow leading up to surgery.  Finish Keflex as prescribed.    Stephanie Goodson MD   URGENT CARE  [facility] URGENT [facility]ARE

## 2018-04-05 ENCOUNTER — TRANSFERRED RECORDS (OUTPATIENT)
Dept: HEALTH INFORMATION MANAGEMENT | Facility: CLINIC | Age: 73
End: 2018-04-05

## 2018-05-08 ENCOUNTER — OFFICE VISIT (OUTPATIENT)
Dept: FAMILY MEDICINE | Facility: CLINIC | Age: 73
End: 2018-05-08
Payer: MEDICARE

## 2018-05-08 VITALS
HEART RATE: 63 BPM | TEMPERATURE: 97.6 F | DIASTOLIC BLOOD PRESSURE: 67 MMHG | HEIGHT: 62 IN | BODY MASS INDEX: 37.17 KG/M2 | WEIGHT: 202 LBS | RESPIRATION RATE: 18 BRPM | OXYGEN SATURATION: 96 % | SYSTOLIC BLOOD PRESSURE: 177 MMHG

## 2018-05-08 DIAGNOSIS — D64.9 NORMOCYTIC ANEMIA: ICD-10-CM

## 2018-05-08 DIAGNOSIS — Z11.59 NEED FOR HEPATITIS C SCREENING TEST: ICD-10-CM

## 2018-05-08 DIAGNOSIS — Z13.89 SCREENING FOR DIABETIC PERIPHERAL NEUROPATHY: ICD-10-CM

## 2018-05-08 DIAGNOSIS — E11.69 TYPE 2 DIABETES MELLITUS WITH OTHER SPECIFIED COMPLICATION, WITHOUT LONG-TERM CURRENT USE OF INSULIN (H): Primary | ICD-10-CM

## 2018-05-08 LAB
ERYTHROCYTE [DISTWIDTH] IN BLOOD BY AUTOMATED COUNT: 12.4 % (ref 10–15)
HCT VFR BLD AUTO: 35.9 % (ref 35–47)
HGB BLD-MCNC: 11.8 G/DL (ref 11.7–15.7)
MCH RBC QN AUTO: 30.5 PG (ref 26.5–33)
MCHC RBC AUTO-ENTMCNC: 32.9 G/DL (ref 31.5–36.5)
MCV RBC AUTO: 93 FL (ref 78–100)
PLATELET # BLD AUTO: 235 10E9/L (ref 150–450)
RBC # BLD AUTO: 3.87 10E12/L (ref 3.8–5.2)
WBC # BLD AUTO: 7.4 10E9/L (ref 4–11)

## 2018-05-08 PROCEDURE — 99207 C FOOT EXAM  NO CHARGE: CPT | Performed by: INTERNAL MEDICINE

## 2018-05-08 PROCEDURE — G0472 HEP C SCREEN HIGH RISK/OTHER: HCPCS | Performed by: INTERNAL MEDICINE

## 2018-05-08 PROCEDURE — 80048 BASIC METABOLIC PNL TOTAL CA: CPT | Performed by: INTERNAL MEDICINE

## 2018-05-08 PROCEDURE — 36415 COLL VENOUS BLD VENIPUNCTURE: CPT | Performed by: INTERNAL MEDICINE

## 2018-05-08 PROCEDURE — 82043 UR ALBUMIN QUANTITATIVE: CPT | Performed by: INTERNAL MEDICINE

## 2018-05-08 PROCEDURE — 85027 COMPLETE CBC AUTOMATED: CPT | Performed by: INTERNAL MEDICINE

## 2018-05-08 PROCEDURE — 99214 OFFICE O/P EST MOD 30 MIN: CPT | Performed by: INTERNAL MEDICINE

## 2018-05-08 NOTE — MR AVS SNAPSHOT
After Visit Summary   5/8/2018    Chasity Hodgson    MRN: [id number]           Patient Information     Date Of Birth          1945        Visit Information        Provider Department      5/8/2018 5:00 PM Shola Benoit MD [facility]        Today's Diagnoses     Type 2 diabetes mellitus with other specified complication, without long-term current use of insulin (H)    -  1    Need for hepatitis C screening test        Screening for diabetic peripheral neuropathy        Normocytic anemia          Care Instructions    (E11.69) Type 2 diabetes mellitus with other specified complication, without long-term current use of insulin (H)  (primary encounter diagnosis)  Comment: We will recheck renal function today and continue to hold metformin as per recommendation from endocrinology   Plan: Albumin Random Urine Quantitative with Creat         Ratio, Basic metabolic panel            (Z11.59) Need for hepatitis C screening test  Comment:   Plan: Hepatitis C Screen Reflex to HCV RNA Quant and         Genotype            (Z13.89) Screening for diabetic peripheral neuropathy  Comment:   Plan: FOOT EXAM  NO CHARGE [92466.114]            (D64.9) Normocytic anemia  Comment: Recheck complete blood counts and follow up in gastroenterology  Plan: Basic metabolic panel, CBC with platelets                     Follow-ups after your visit        Your next 10 appointments already scheduled     Jun 04, 2018 10:50 AM CDT   (Arrive by 10:35 AM)   RETURN HAIRLOSS with Renetta Meyer MD   [facility] ([facility])    [address] [id number] [phone]            Jun 27, 2018  9:15 AM CDT   Return Visit with Andreea Ortiz MD   [facility] ([facility])    [address] [place] [id number] [phone] OPT 2              Who to contact     If you have  "questions or need follow up information about today's clinic visit or your schedule please contact [facility] directly at [phone].  Normal or non-critical lab and imaging results will be communicated to you by MyChart, letter or phone within 4 business days after the clinic has received the results. If you do not hear from us within 7 days, please contact the clinic through [employer] or phone. If you have a critical or abnormal lab result, we will notify you by phone as soon as possible.  Submit refill requests through [employer] or call your pharmacy and they will forward the refill request to us. Please allow 3 business days for your refill to be completed.          Additional Information About Your Visit        [employer] Information     [employer] gives you secure access to your electronic health record. If you see a primary care provider, you can also send messages to your care team and make appointments. If you have questions, please call your primary care clinic.  If you do not have a primary care provider, please call [phone] and they will assist you.        Care EveryWhere ID     This is your Care EveryWhere ID. This could be used by other organizations to access your [place] medical records  [id number]        Your Vitals Were     Pulse Temperature Respirations Height Pulse Oximetry BMI (Body Mass Index)    63 97.6  F (36.4  C) (Tympanic) 18 5' 1.5\" (1.562 m) 96% 37.55 kg/m2       Blood Pressure from Last 3 Encounters:   05/08/18 177/67   04/02/18 142/60   03/31/18 141/57    Weight from Last 3 Encounters:   05/08/18 202 lb (91.6 kg)   04/02/18 205 lb (93 kg)   03/31/18 206 lb (93.4 kg)              We Performed the Following     Albumin Random Urine Quantitative with Creat Ratio     Basic metabolic panel     CBC with platelets     FOOT EXAM  NO CHARGE [47866.114]     Hepatitis C Screen Reflex to HCV RNA Quant and Genotype        Primary Care Provider Office Phone # Fax #    Shola Coyle " MD [place] [phone]       [address] [facility] [address]  [facility] [address]        Equal Access to Services     CORINNE ROBB : Bang aad ku hadjudyfco Marcelinabhavna, nicole srinathdasha, sunta kasabrinada consuelo, jose cyndinamrata mann laZainabantonella kim. So [facility] [phone].    ATENCIÓN: Si habla español, tiene a modi disposición servicios gratuitos de asistencia lingüística. Llame al [phone].    We comply with applicable federal civil rights laws and [place] laws. We do not discriminate on the basis of race, color, national origin, age, disability, sex, sexual orientation, or gender identity.            Thank you!     Thank you for choosing [facility]  for your care. Our goal is always to provide you with excellent care. Hearing back from our patients is one way we can continue to improve our services. Please take a few minutes to complete the written survey that you may receive in the mail after your visit with us. Thank you!             Your Updated Medication List - Protect others around you: Learn how to safely use, store and throw away your medicines at www.disposemymeds.org.          This list is accurate as of 5/8/18  5:34 PM.  Always use your most recent med list.                   Brand Name Dispense Instructions for use Diagnosis    ascorbic acid 1000 MG Tabs    vitamin C     Take 1,000 mg by mouth daily        ASTEPRO NA           atenolol 50 MG tablet    TENORMIN    180 tablet    Take 1 tablet (50 mg) by mouth 2 times daily    Essential hypertension       BIOTIN PO      Take 1,000 mcg by mouth        blood glucose monitoring test strip    no brand specified    1 Box    Use to test blood sugar 1 times daily or as directed.    Type 2 diabetes mellitus with other specified complication (H)       clotrimazole 1 % cream    LOTRIMIN     Apply topically daily        cycloSPORINE 0.05 % ophthalmic emulsion    RESTASIS     1 drop 2 times daily        diphenhydrAMINE-acetaminophen  MG  tablet    TYLENOL PM     Take 1 tablet by mouth At Bedtime Reported on 3/20/2017        famotidine 40 MG tablet    PEPCID     Take 40 mg by mouth At Bedtime        fenofibrate 145 MG tablet     90 tablet    Take 1 tablet (145 mg) by mouth daily    CARDIOVASCULAR SCREENING; LDL GOAL LESS THAN 130       ferrous sulfate 325 (65 Fe) MG tablet    IRON     Take 325 mg by mouth daily (with breakfast)        fexofenadine 180 MG tablet    ALLEGRA    120    Take 180 mg by mouth daily.        fluticasone 50 MCG/ACT spray    FLONASE    1 Bottle    Spray 2 sprays in nostril daily 2 sprays in each nostril qd    Seasonal allergic rhinitis due to other allergic trigger       gabapentin 100 MG capsule    NEURONTIN    90 capsule    Take 1 capsule (100 mg) by mouth every evening as needed    Poliomyelitis       hydrochlorothiazide 25 MG tablet    HYDRODIURIL    90 tablet    Take 1 tablet (25 mg) by mouth daily    Essential hypertension       lisinopril 20 MG tablet    PRINIVIL/ZESTRIL    90 tablet    Take 1 tablet (20 mg) by mouth daily    Essential hypertension       metFORMIN 500 MG tablet    GLUCOPHAGE     Take 500 mg by mouth daily (with dinner)        metoclopramide 10 MG tablet    REGLAN     Take 1-2 tabs as needed        multivitamin per tablet     100    ONE DAILY        nitroGLYcerin 0.4 MG sublingual tablet    NITROSTAT    25 tablet    Place 1 tablet (0.4 mg) under the tongue every 5 minutes as needed for chest pain (no more than 3 in one hour; after 3rd, call 911.)    Atypical chest pain       nystatin cream    MYCOSTATIN     Apply topically daily as needed        nystatin-triamcinolone cream    MYCOLOG II     Apply topically daily as needed        ondansetron 4 MG tablet    ZOFRAN     Take 1 tablet by mouth every 6 hours as needed Reported on 3/20/2017        * order for DME     1 Units    Equipment being ordered: Oral appliance for sleep apnea    ANNETTE (obstructive sleep apnea)       * order for DME     1 each    Donut  Pillow    Coccydynia       * order for DME     2 each    Equipment being ordered: Compression stockings - Knee High; 20-30 mmHg compression    Insufficiency, arterial, peripheral (H)       pantoprazole 40 MG EC tablet    PROTONIX     Take 40 mg by mouth 2 times daily        rosuvastatin 10 MG tablet    CRESTOR    30 tablet    Take 1 tablet (10 mg) by mouth daily    Hyperlipidemia LDL goal <100       SB LOW DOSE ASA EC 81 MG EC tablet   Generic drug:  aspirin      Take 81 mg by mouth daily        sitagliptin 50 MG tablet    JANUVIA     Take 50 mg by mouth daily        SYNTHROID 112 MCG tablet   Generic drug:  levothyroxine      Take 112 mcg by mouth daily Take 112 mcg daily except for Friday takes an additional 56 mcg.  Brand name Synthroid        TOUJEO SOLOSTAR 300 UNIT/ML injection   Generic drug:  insulin glargine U-300      Inject 34 Units Subcutaneous every morning        triamcinolone 0.1 % cream    KENALOG     Apply topically daily        VITAMIN D-3 PO      Take 2 tablets by mouth        * Notice:  This list has 3 medication(s) that are the same as other medications prescribed for you. Read the directions carefully, and ask your doctor or other care provider to review them with you.

## 2018-05-08 NOTE — PROGRESS NOTES
"[facility]  CLINIC PROGRESS NOTE    Subjective:  Diabetes mellitus   She has had follow up in endocrinology and did have basic metabolic panel showing slight rise in her creatinine.    Anemia   Chasity Hodgson returns to clinic for follow-up of her recent anemia noted prior to her total shoulder surgery.  She did have a follow-up in gastroenterology on March 20 and had been taking famotidine which was helpful for her dyspepsia in addition to pantoprazole.  Her CAT scan was also reviewed at that time which was unremarkable.  Her last colonoscopy was noted to be about 8 years ago and she was recommended to schedule another one.  Her most recent iron studies were checked in January and were within normal limits.      Past medical history, medications, allergies, social history, family history reviewed and updated in [facility] as of 5/8/2018 .    ROS  CONSTITUTIONAL: no fatigue, no unexpected change in weight  SKIN: no worrisome rashes, no worrisome moles, no worrisome lesions  EYES: no acute vision problems or changes  ENT: no ear problems, no mouth problems, no throat problems  RESP: no significant cough, no shortness of breath  CV: no chest pain, no palpitations, no new or worsening peripheral edema  GI: no nausea, no vomiting, no constipation, no diarrhea  : no frequency, no dysuria, no hematuria  MS: still slight shoulder discomfort, also continues to use brace for foot drop  PSYCHIATRIC: no changes in mood or affect      Objective:  Vitals  /67 (BP Location: Left arm, Patient Position: Chair, Cuff Size: Adult Large)  Pulse 63  Temp 97.6  F (36.4  C) (Tympanic)  Resp 18  Ht 5' 1.5\" (1.562 m)  Wt 202 lb (91.6 kg)  SpO2 96%  BMI 37.55 kg/m2  GEN: Alert Oriented x3 NAD  HEENT: Atraumatic, normocephalic, neck supple, no thyromegaly, negative cervical adenopathy  TM: TM bilaterally pearly and grey with normal light reflex  CV: RRR no murmurs or rubs  PULM: CTA no wheezes or crackles  ABD: Soft, " nontender nondistended, no hepatosplenomegally  SKIN: redness along the incision line.  EXT: trace edema bilateral lower extremities  NEURO: Gait and station deferred, No focal neurologic deficits  PSYCH: Mood good, affect mood congruent    No images are attached to the encounter.    No results found for this or any previous visit (from the past 24 hour(s)).    Assessment/Plan:  Patient Instructions   (E11.69) Type 2 diabetes mellitus with other specified complication, without long-term current use of insulin (H)  (primary encounter diagnosis)  Comment: We will recheck renal function today and continue to hold metformin as per recommendation from endocrinology   Plan: Albumin Random Urine Quantitative with Creat         Ratio, Basic metabolic panel            (Z11.59) Need for hepatitis C screening test  Comment:   Plan: Hepatitis C Screen Reflex to HCV RNA Quant and         Genotype            (Z13.89) Screening for diabetic peripheral neuropathy  Comment:   Plan: FOOT EXAM  NO CHARGE [97147.114]            (D64.9) Normocytic anemia  Comment: Recheck complete blood counts and follow up in gastroenterology  Plan: Basic metabolic panel, CBC with platelets               Follow up in gastroenterology and orthopedics     Disclaimer: This note consists of symbols derived from keyboarding, dictation and/or voice recognition software. As a result, there may be errors in the script that have gone undetected. Please consider this when interpreting information found in this chart.    Shola Benoit MD  [phone]

## 2018-05-08 NOTE — LETTER
[place] [address]       To whom it may concern,    Chasity oHdgson is a patient at [facility].  Due to chronic condition of foot drop she is required to use an AFO.  Due to this condition she would need to be able to wear lace up tennis shoes.    Sincerely,  Shola Benoit MD  5/8/2018

## 2018-05-08 NOTE — PATIENT INSTRUCTIONS
(E11.69) Type 2 diabetes mellitus with other specified complication, without long-term current use of insulin (H)  (primary encounter diagnosis)  Comment: We will recheck renal function today and continue to hold metformin as per recommendation from endocrinology   Plan: Albumin Random Urine Quantitative with Creat         Ratio, Basic metabolic panel            (Z11.59) Need for hepatitis C screening test  Comment:   Plan: Hepatitis C Screen Reflex to HCV RNA Quant and         Genotype            (Z13.89) Screening for diabetic peripheral neuropathy  Comment:   Plan: FOOT EXAM  NO CHARGE [96947.114]            (D64.9) Normocytic anemia  Comment: Recheck complete blood counts and follow up in gastroenterology  Plan: Basic metabolic panel, CBC with platelets

## 2018-05-09 LAB
ANION GAP SERPL CALCULATED.3IONS-SCNC: 8 MMOL/L (ref 3–14)
BUN SERPL-MCNC: 32 MG/DL (ref 7–30)
CALCIUM SERPL-MCNC: 9.6 MG/DL (ref 8.5–10.1)
CHLORIDE SERPL-SCNC: 107 MMOL/L (ref 94–109)
CO2 SERPL-SCNC: 27 MMOL/L (ref 20–32)
CREAT SERPL-MCNC: 1.19 MG/DL (ref 0.52–1.04)
CREAT UR-MCNC: 84 MG/DL
GFR SERPL CREATININE-BSD FRML MDRD: 45 ML/MIN/1.7M2
GLUCOSE SERPL-MCNC: 149 MG/DL (ref 70–99)
HCV AB SERPL QL IA: NONREACTIVE
MICROALBUMIN UR-MCNC: 9 MG/L
MICROALBUMIN/CREAT UR: 10.43 MG/G CR (ref 0–25)
POTASSIUM SERPL-SCNC: 4.3 MMOL/L (ref 3.4–5.3)
SODIUM SERPL-SCNC: 142 MMOL/L (ref 133–144)

## 2018-05-09 NOTE — PROGRESS NOTES
Gerard Gaspar,    I have had the opportunity to review your recent results and an interpretation is as follows:  Your follow-up hemoglobin return back to normal    Sincerely,  Shola Benoit MD

## 2018-05-10 NOTE — PROGRESS NOTES
Gerard Cochran    I have had the opportunity to review your recent results and an interpretation is as follows:  Your follow up basic metabolic panel shows stable renal function and electrolytes     Sincerely,  Shola Benoit MD

## 2018-05-24 ENCOUNTER — TELEPHONE (OUTPATIENT)
Dept: FAMILY MEDICINE | Facility: CLINIC | Age: 73
End: 2018-05-24

## 2018-05-24 ENCOUNTER — TRANSFERRED RECORDS (OUTPATIENT)
Dept: HEALTH INFORMATION MANAGEMENT | Facility: CLINIC | Age: 73
End: 2018-05-24

## 2018-05-24 NOTE — TELEPHONE ENCOUNTER
This patient came up on our adherence list - upon calling her she said she is not taking the crestor because it is causing leg cramps.  We advised her to call you to talk about this.    Just wanted to let you know.  If patient is discontinuing crestor we want to update her profile.    Thanks,  Mansi Patterson CPhT  [facility]  [phone]

## 2018-05-25 NOTE — TELEPHONE ENCOUNTER
Can we call Chasity Hodgson and ask her to schedule an appointment to review recommendations and discuss statin use?    Shola Benoit MD

## 2018-05-29 ENCOUNTER — TRANSFERRED RECORDS (OUTPATIENT)
Dept: HEALTH INFORMATION MANAGEMENT | Facility: CLINIC | Age: 73
End: 2018-05-29

## 2018-06-01 ENCOUNTER — OFFICE VISIT (OUTPATIENT)
Dept: FAMILY MEDICINE | Facility: CLINIC | Age: 73
End: 2018-06-01
Payer: MEDICARE

## 2018-06-01 ENCOUNTER — TELEPHONE (OUTPATIENT)
Dept: FAMILY MEDICINE | Facility: CLINIC | Age: 73
End: 2018-06-01

## 2018-06-01 VITALS
RESPIRATION RATE: 18 BRPM | TEMPERATURE: 97.7 F | WEIGHT: 203 LBS | DIASTOLIC BLOOD PRESSURE: 80 MMHG | HEART RATE: 60 BPM | SYSTOLIC BLOOD PRESSURE: 138 MMHG | HEIGHT: 62 IN | OXYGEN SATURATION: 99 % | BODY MASS INDEX: 37.36 KG/M2

## 2018-06-01 DIAGNOSIS — D62 ANEMIA DUE TO BLOOD LOSS, ACUTE: Primary | ICD-10-CM

## 2018-06-01 DIAGNOSIS — E78.5 HYPERLIPIDEMIA LDL GOAL <100: ICD-10-CM

## 2018-06-01 DIAGNOSIS — Z93.3 COLOSTOMY IN PLACE (H): ICD-10-CM

## 2018-06-01 PROCEDURE — 99213 OFFICE O/P EST LOW 20 MIN: CPT | Performed by: INTERNAL MEDICINE

## 2018-06-01 RX ORDER — PITAVASTATIN CALCIUM 2.09 MG/1
2 TABLET, FILM COATED ORAL DAILY
Qty: 30 TABLET | Refills: 11 | Status: SHIPPED | OUTPATIENT
Start: 2018-06-01 | End: 2018-08-01

## 2018-06-01 NOTE — TELEPHONE ENCOUNTER
Please do not close this encounter until this has been addressed.  (prior auth approved/denied, prescriber refusal to complete prior auth or medication changed/discontinued)    Prior Authorization needed on: Livalo 2mg  Drug NDC: [address]-54     Insurance: [employer] Bin:  [id number], Pcn:  [id number], Group:  [id number]  Member ID: [id number]   Insurance phone #: [phone]    Pharmacy NPI: [id number]  Pharmacy Phone #: [phone]  Pharmacy Fax #: [phone]    Please let us know if the PA gets approved or denied or if medication is changed  Please change medication or provide reasoning/documentation and forward to PA Team at [id number].    Thanks,  Mansi Patterson CPhT  [facility]  [phone]

## 2018-06-01 NOTE — MR AVS SNAPSHOT
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)          Care Instructions    (D62) Anemia due to blood loss, acute  (primary encounter diagnosis)  Comment: Noted that most recent hemoglobin was within normal limits and your recent colonoscopy was excellent  Plan:     (E78.5) Hyperlipidemia LDL goal <100  Comment: Given muscle aches with Crestor, we will switch to a new medication - LIvalo and plan to follow up in 3 months for labs   Plan: pitavastatin ( LIVALO) 2 MG TABS tablet            (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)  Comment: Doing well, colonoscopy was reportedly normal   Plan:              Follow-ups after your visit        Your next 10 appointments already scheduled     Jun 27, 2018  9:15 AM CDT   Return Visit with Andreea Ortiz MD   [facility] ([facility])    [address] [place] [id number] [phone] OPT 2            Sep 10, 2018 10:30 AM CDT   (Arrive by 10:15 AM)   RETURN HAIRLOSS with Renetta Meyer MD   [facility] ([facility])    [address]  3rd Floor  [facility] [address] [phone]              Who to contact     If you have questions or need follow up information about today's clinic visit or your schedule please contact [facility] directly at [phone].  Normal or non-critical lab and imaging results will be communicated to you by MyChart, letter or phone within 4 business days after the clinic has received the results. If you do not hear from us within 7 days, please contact the clinic through  "MyChart or phone. If you have a critical or abnormal lab result, we will notify you by phone as soon as possible.  Submit refill requests through i[employer] or call your pharmacy and they will forward the refill request to us. Please allow 3 business days for your refill to be completed.          Additional Information About Your Visit        [employer] Information     [employer] gives you secure access to your electronic health record. If you see a primary care provider, you can also send messages to your care team and make appointments. If you have questions, please call your primary care clinic.  If you do not have a primary care provider, please call [phone] and they will assist you.        Care EveryWhere ID     This is your Care EveryWhere ID. This could be used by other organizations to access your [facility] medical records  [id number]        Your Vitals Were     Pulse Temperature Respirations Height Pulse Oximetry BMI (Body Mass Index)    60 97.7  F (36.5  C) (Oral) 18 5' 1.5\" (1.562 m) 99% 37.74 kg/m2       Blood Pressure from Last 3 Encounters:   06/01/18 138/80   05/08/18 177/67   04/02/18 142/60    Weight from Last 3 Encounters:   06/01/18 203 lb (92.1 kg)   05/08/18 202 lb (91.6 kg)   04/02/18 205 lb (93 kg)              Today, you had the following     No orders found for display         Today's Medication Changes          These changes are accurate as of 6/1/18 12:58 PM.  If you have any questions, ask your nurse or doctor.               Start taking these medicines.        Dose/Directions    pitavastatin 2 MG Tabs tablet   Commonly known as:   LIVALO   Used for:  Hyperlipidemia LDL goal <100   Started by:  Shola Benoit MD        Dose:  2 mg   Take 1 tablet (2 mg) by mouth daily   Quantity:  30 tablet   Refills:  11         Stop taking these medicines if you haven't already. Please contact your care team if you have questions.     metFORMIN 500 MG tablet   Commonly known as:  GLUCOPHAGE "   Stopped by:  Shola Benoit MD           rosuvastatin 10 MG tablet   Commonly known as:  CRESTOR   Stopped by:  Shola Benoit MD                Where to get your medicines      These medications were sent to [facility], [place] - [address]  [address] [facility] [address]     Phone:  [phone]     pitavastatin 2 MG Tabs tablet                Primary Care Provider Office Phone # Fax #    Shola Benoit [phone] [address]  [place] [address]        Equal Access to Services     [facility]: Hadii aad ku hadasho Soomaali, waaxda luqadaha, qaybta kaalmada adeegyada, waxuziel hannain hayaan jory baldwin . So [facility] [phone].    ATENCIÓN: Si habla español, tiene a modi disposición servicios gratuitos de asistencia lingüística. Ll[facility] [phone].    We comply with applicable federal civil rights laws and [place] laws. We do not discriminate on the basis of race, color, national origin, age, disability, sex, sexual orientation, or gender identity.            Thank you!     Thank you for choosing [facility]  for your care. Our goal is always to provide you with excellent care. Hearing back from our patients is one way we can continue to improve our services. Please take a few minutes to complete the written survey that you may receive in the mail after your visit with us. Thank you!             Your Updated Medication List - Protect others around you: Learn how to safely use, store and throw away your medicines at www.disposemymeds.org.          This list is accurate as of 6/1/18 12:58 PM.  Always use your most recent med list.                   Brand Name Dispense Instructions for use Diagnosis    ascorbic acid 1000 MG Tabs    vitamin C     Take 1,000 mg by mouth daily        ASTEPRO NA           atenolol 50 MG tablet    TENORMIN    180 tablet    Take 1 tablet (50 mg) by mouth 2 times  daily    Essential hypertension       BIOTIN PO      Take 1,000 mcg by mouth        blood glucose monitoring test strip    no brand specified    1 Box    Use to test blood sugar 1 times daily or as directed.    Type 2 diabetes mellitus with other specified complication (H)       clotrimazole 1 % cream    LOTRIMIN     Apply topically daily        cycloSPORINE 0.05 % ophthalmic emulsion    RESTASIS     1 drop 2 times daily        diphenhydrAMINE-acetaminophen  MG tablet    TYLENOL PM     Take 1 tablet by mouth At Bedtime Reported on 3/20/2017        famotidine 40 MG tablet    PEPCID     Take 40 mg by mouth At Bedtime        fenofibrate 145 MG tablet     90 tablet    Take 1 tablet (145 mg) by mouth daily    CARDIOVASCULAR SCREENING; LDL GOAL LESS THAN 130       ferrous sulfate 325 (65 Fe) MG tablet    IRON     Take 325 mg by mouth daily (with breakfast)        fexofenadine 180 MG tablet    ALLEGRA    120    Take 180 mg by mouth daily.        fluticasone 50 MCG/ACT spray    FLONASE    1 Bottle    Spray 2 sprays in nostril daily 2 sprays in each nostril qd    Seasonal allergic rhinitis due to other allergic trigger       gabapentin 100 MG capsule    NEURONTIN    90 capsule    Take 1 capsule (100 mg) by mouth every evening as needed    Poliomyelitis       hydrochlorothiazide 25 MG tablet    HYDRODIURIL    90 tablet    Take 1 tablet (25 mg) by mouth daily    Essential hypertension       lisinopril 20 MG tablet    PRINIVIL/ZESTRIL    90 tablet    Take 1 tablet (20 mg) by mouth daily    Essential hypertension       metoclopramide 10 MG tablet    REGLAN     Take 1-2 tabs as needed        multivitamin per tablet     100    ONE DAILY        nitroGLYcerin 0.4 MG sublingual tablet    NITROSTAT    25 tablet    Place 1 tablet (0.4 mg) under the tongue every 5 minutes as needed for chest pain (no more than 3 in one hour; after 3rd, call 911.)    Atypical chest pain       nystatin cream    MYCOSTATIN     Apply topically daily  as needed        nystatin-triamcinolone cream    MYCOLOG II     Apply topically daily as needed        ondansetron 4 MG tablet    ZOFRAN     Take 1 tablet by mouth every 6 hours as needed Reported on 3/20/2017        * order for DME     1 Units    Equipment being ordered: Oral appliance for sleep apnea    ANNETTE (obstructive sleep apnea)       * order for DME     1 each    Donut Pillow    Coccydynia       * order for DME     2 each    Equipment being ordered: Compression stockings - Knee High; 20-30 mmHg compression    Insufficiency, arterial, peripheral (H)       pantoprazole 40 MG EC tablet    PROTONIX     Take 40 mg by mouth 2 times daily        pitavastatin 2 MG Tabs tablet     LIVALO    30 tablet    Take 1 tablet (2 mg) by mouth daily    Hyperlipidemia LDL goal <100       SB LOW DOSE ASA EC 81 MG EC tablet   Generic drug:  aspirin      Take 81 mg by mouth daily        sitagliptin 50 MG tablet    JANUVIA     Take 50 mg by mouth daily        SYNTHROID 112 MCG tablet   Generic drug:  levothyroxine      Take 112 mcg by mouth daily Take 112 mcg daily except for Friday takes an additional 56 mcg.  Brand name Synthroid        TOUJEO SOLOSTAR 300 UNIT/ML injection   Generic drug:  insulin glargine U-300      Inject 34 Units Subcutaneous every morning        triamcinolone 0.1 % cream    KENALOG     Apply topically daily        VITAMIN D-3 PO      Take 2 tablets by mouth        * Notice:  This list has 3 medication(s) that are the same as other medications prescribed for you. Read the directions carefully, and ask your doctor or other care provider to review them with you.

## 2018-06-01 NOTE — Clinical Note
Please abstract the following data from this visit with this patient into the appropriate field in Epic:  Colonoscopy done on this date: 5/29/18 (approximately), by this group: [facility] in [place], results were 2 polyps removed and diverticuli seen. Report coming.

## 2018-06-01 NOTE — PATIENT INSTRUCTIONS
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

## 2018-06-01 NOTE — PROGRESS NOTES
"  SUBJECTIVE:   Chasity Hodgson is a 72 year old female who presents to clinic today for the following health issues:      [facility]  CLINIC PROGRESS NOTE    Subjective:  Hyperlipidemia   Chasity Hodgson notes that she is getting andrei horses in her left leg when she is taking rosuvastatin.  She stopped taking this medication about 2 weeks ago and her symptoms improved.  She has tried atorvastatin in the past and also had similar symptoms.  She would like to try an alternative medication.     Diabetes mellitus   She has been following in endocrinology and has continued on Januvia in addition to Insulin glargine.      Past medical history, medications, allergies, social history, family history reviewed and updated in [facility] as of 6/1/2018 .    ROS  CONSTITUTIONAL: no fatigue, no unexpected change in weight  SKIN: no worrisome rashes, no worrisome moles, no worrisome lesions  EYES: no acute vision problems or changes  ENT: no ear problems, no mouth problems, no throat problems  RESP: no significant cough, no shortness of breath  CV: no chest pain, no palpitations,  GI: no nausea, no vomiting, no constipation, no diarrhea  : no frequency, no dysuria, no hematuria  MS: as above   PSYCHIATRIC: no changes in mood or affect      Objective:  Vitals  /80  Pulse 60  Temp 97.7  F (36.5  C) (Oral)  Resp 18  Ht 5' 1.5\" (1.562 m)  Wt 203 lb (92.1 kg)  SpO2 99%  BMI 37.74 kg/m2  GEN: Alert Oriented x3 NAD  NEURO: Gait and station normal , No focal neurologic deficits  PSYCH: Mood good, affect mood congruent    No images are attached to the encounter.    No results found for this or any previous visit (from the past 24 hour(s)).    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  Over 50% of time counseling     Disclaimer: This note consists of symbols derived from keyboarding, dictation and/or voice recognition software. As a result, there may be errors in the script that have gone undetected. Please consider this when interpreting information found in this chart.    Shola Benoit MD  [phone]

## 2018-06-08 ENCOUNTER — DOCUMENTATION ONLY (OUTPATIENT)
Dept: OTHER | Facility: CLINIC | Age: 73
End: 2018-06-08

## 2018-06-14 NOTE — TELEPHONE ENCOUNTER
Central Prior Authorization Team   Phone: [phone]    PA Initiation    Medication: Livalo  Insurance Company: [employer] - Phone [phone] Fax [phone]  Pharmacy Filling the Rx: [place], [place] - [address]  Filling Pharmacy Phone: [phone]  Filling Pharmacy Fax: [phone]  Start Date: 6/14/2018    MANUALLY FAXED.

## 2018-06-15 NOTE — TELEPHONE ENCOUNTER
Prior Authorization Approval    Authorization Effective Date: 6/14/2018  Authorization Expiration Date: 12/31/2018  Medication: Livalo-APPROVED  Approved Dose/Quantity:    Reference #:     Insurance Company: [employer] - Phone [phone] Fax [phone]  Expected CoPay:       CoPay Card Available:      Foundation Assistance Needed:    Which Pharmacy is filling the prescription (Not needed for infusion/clinic administered): [facility] [place], [place] - [address] [address]  Pharmacy Notified: Yes  Patient Notified: Yes

## 2018-06-25 DIAGNOSIS — Z13.6 CARDIOVASCULAR SCREENING; LDL GOAL LESS THAN 130: ICD-10-CM

## 2018-06-25 NOTE — TELEPHONE ENCOUNTER
"Last Written Prescription Date:  9/27/17  Last Fill Quantity: 90 tablet,  # refills: 2   Last office visit: 6/1/2018 with prescribing provider:  Kaycee   Future Office Visit:   Next 5 appointments (look out 90 days)     Jun 27, 2018  9:15 AM CDT   Return Visit with Andreea Ortiz MD   [facility] ([facility])    [address] [place] [id number] [phone] OPT 2                 Requested Prescriptions   Pending Prescriptions Disp Refills     fenofibrate 145 MG tablet 90 tablet 2     Sig: Take 1 tablet (145 mg) by mouth daily    Fibrates Passed    6/25/2018  3:09 PM       Passed - Lipid panel on file in past 12 months    Recent Labs   Lab Test 01/29/18  02/15/17   03/31/15   1327   CHOL  185   < >  142   < >  158   TRIG  290*   < >  309   < >  365*   HDL  39*   < >  31   < >  34*   LDL  105*   --   49   < >  51   NHDL  146*   --   111   < >   --    VLDL   --    --    --    --   73*   CHOLHDLRATIO   --    --   4.6   --   4.6    < > = values in this interval not displayed.              Passed - No abnormal creatine kinase in past 12 months    Recent Labs   Lab Test  08/27/13   1600   CKT  79               Passed - Recent (12 mo) or future (30 days) visit within the authorizing provider's specialty    Patient had office visit in the last 12 months or has a visit in the next 30 days with authorizing provider or within the authorizing provider's specialty.  See \"Patient Info\" tab in inbasket, or \"Choose Columns\" in Meds & Orders section of the refill encounter.           Passed - Patient is age 18 or older       Passed - No active pregnancy on record       Passed - No positive pregnancy test in past 12 months          "

## 2018-06-27 ENCOUNTER — OFFICE VISIT (OUTPATIENT)
Dept: CARDIOLOGY | Facility: CLINIC | Age: 73
End: 2018-06-27
Attending: INTERNAL MEDICINE
Payer: MEDICARE

## 2018-06-27 VITALS
DIASTOLIC BLOOD PRESSURE: 70 MMHG | WEIGHT: 202 LBS | SYSTOLIC BLOOD PRESSURE: 148 MMHG | HEIGHT: 62 IN | HEART RATE: 60 BPM | BODY MASS INDEX: 37.17 KG/M2

## 2018-06-27 DIAGNOSIS — R07.89 ATYPICAL CHEST PAIN: ICD-10-CM

## 2018-06-27 PROCEDURE — 99213 OFFICE O/P EST LOW 20 MIN: CPT | Performed by: INTERNAL MEDICINE

## 2018-06-27 RX ORDER — FENOFIBRATE 145 MG/1
145 TABLET, COATED ORAL DAILY
Qty: 90 TABLET | Refills: 1 | Status: SHIPPED | OUTPATIENT
Start: 2018-06-27 | End: 2018-12-11

## 2018-06-27 NOTE — PROGRESS NOTES
HPI and Plan:    Ms. Chasity Hodgson was seen in followup at [facility] in [place].  She is now 72 years old and has previously been seen for atypical chest discomfort, hypertension and lower extremity edema.      In the past, she has been evaluated for atypical chest discomfort with CT coronary angiography eventually demonstrating an absence of obstructive coronary disease.  That CT angiogram was performed in 2011.  Her last stress nuclear study was in 2014 at which time there was no evidence of ischemia or infarct.  She has normal left ventricular systolic performance.      She does have evidence of plaque in her lower extremity arterial circulation.  There was no evidence of obstructive disease based on Dr. Lazo's evaluation in 2016.  She underwent Holter monitoring before her last visit which she requested for re-evaluation of chest discomfort.  The Holter monitor demonstrated  normal sinus rhythm with average heart rate variability and isolated PVCs of which there were only 2 and rare PACs.      Last Spring, she developed chest pressure and nausea leading to some vomiting. She has had episodes that have been similar. The computer when she is supine and in bed. If she rolls to her side, the symptom resolves. This does not occur every night but rather occurs intermittently. She is seeing Dr. Pantoja of gastroenterology and she is on Protonix. She has not tried either antacids or nitroglycerin with the episodes. The episodes are unassociated with shortness of breath or radiation of the discomfort.    Eventually, she underwent stress echocardiography. She said she would be able to exercise despite her right leg brace and her history of polio. She was able to exercise 4 minutes on the Ankit protocol which actually represents a significant effort given her low stature. She reached only 71% of her age-predicted heart rate (but continued on beta blockade for the study) and there was no ischemia or reproduction  "of chest discomfort.     Her blood pressure has previously been elevated and now has been improved She continues on atenolol, lisinopril and low-dose hydrochlorothiazide.      She does have a history of carotid disease as well and I note that in 2015 Dr. Benoit ordered a carotid ultrasound which demonstrated a 50-69% stenosis in the left internal carotid and a less than 50% stenosis in the right internal carotid artery.  She does continue on low dose aspirin, statin therapy and the ACE inhibition and beta blockade described above.  She has known normal left ventricular systolic performance.  Her last LDL fraction was not measurable given the rise in TG's and she has a low HDL of 31 and total cholesterol of 202 mg/dL.  Her history is also significant for diabetes mellitus, reactive airway disease, a prior history of polio, a prior colostomy and a prior history of thyroid cancer.      PHYSICAL EXAMINATION:     GENERAL:  This is a woman who appears younger than her stated age.   VITAL SIGNS:Blood pressure 148/70, pulse 60, height 1.562 m (5' 1.5\"), weight 91.6 kg (202 lb), not currently breastfeeding.    ;respiratory rate 14-18 per minute.   CHEST:  Clear to auscultation.   CARDIAC:  On cardiac auscultation, there was an S1 and an S2 without extra sounds or a murmur.  The rhythm was regular.      ASSESSMENT:  Ms. Hodgson had atypical chest discomfort associated with a very stressful event.  We discussed it at length again. Given her performance on the stress echocardiogram, it seems less likely that her symptoms represent coronary ischemia.    She agreed. I did prescribe sublingual nitroglycerin and asked her to try the nitroglycerin several times to see if the symptoms are relieved within minutes. I also suggested that she try Tums for some of the episodes. This could improve our understanding of the etiology of the episodes. On    If her symptoms are relieved with nitroglycerin and not with antacids, then I would " recommend further evaluation. I would probably start with a CT coronary angiogram.  If she develops typical angina which has an exertional or supine component, I would recommend coronary angiography.  To follow up on these symptoms, I recommended a return at 6 weeks with Marilu Lemos, nurse practitioner. She will follow-up with myself in 4 months.     With regard to risk factor intervention, she is now experiencing an increase in triglycerides. Her LDL has previously been low. Likely the rise in triglycerides in August was related to her temporary discontinuation of metformin and an elevation of her hemoglobin A1c.     With regard to her history of hypertension, her blood pressure would appear to be controlled.  I suspect it was not as well controlled during the events which she described and that likely contributed to her symptoms.           Orders Placed This Encounter   Procedures     Follow-Up with Cardiologist       No orders of the defined types were placed in this encounter.      There are no discontinued medications.      Encounter Diagnosis   Name Primary?     Atypical chest pain        CURRENT MEDICATIONS:  Current Outpatient Prescriptions   Medication Sig Dispense Refill     ascorbic acid (VITAMIN C) 1000 MG TABS Take 1,000 mg by mouth daily       aspirin (SB LOW DOSE ASA EC) 81 MG EC tablet Take 81 mg by mouth daily       atenolol (TENORMIN) 50 MG tablet Take 1 tablet (50 mg) by mouth 2 times daily 180 tablet 3     Azelastine HCl (ASTEPRO NA)        BIOTIN PO Take 1,000 mcg by mouth       blood glucose monitoring (NO BRAND SPECIFIED) test strip Use to test blood sugar 1 times daily or as directed. 1 Box 6     Cholecalciferol (VITAMIN D-3 PO) Take 2 tablets by mouth       clotrimazole (LOTRIMIN) 1 % cream Apply topically daily       cycloSPORINE (RESTASIS) 0.05 % ophthalmic emulsion 1 drop 2 times daily       diphenhydrAMINE-acetaminophen (TYLENOL PM)  MG tablet Take 1 tablet by mouth At Bedtime  Reported on 3/20/2017       famotidine (PEPCID) 40 MG tablet Take 40 mg by mouth At Bedtime       fenofibrate 145 MG tablet Take 1 tablet (145 mg) by mouth daily 90 tablet 2     ferrous sulfate (IRON) 325 (65 FE) MG tablet Take 325 mg by mouth daily (with breakfast)       fexofenadine (ALLEGRA) 180 MG tablet Take 180 mg by mouth daily. 120 0     fluticasone (FLONASE) 50 MCG/ACT spray Spray 2 sprays in nostril daily 2 sprays in each nostril qd 1 Bottle 0     gabapentin (NEURONTIN) 100 MG capsule Take 1 capsule (100 mg) by mouth every evening as needed 90 capsule 3     hydrochlorothiazide (HYDRODIURIL) 25 MG tablet Take 1 tablet (25 mg) by mouth daily 90 tablet 3     insulin glargine U-300 (TOUJEO SOLOSTAR) 300 UNIT/ML injection Inject 34 Units Subcutaneous every morning        levothyroxine (SYNTHROID) 112 MCG tablet Take 112 mcg by mouth daily Take 112 mcg daily except for Friday takes an additional 56 mcg.  Brand name Synthroid       lisinopril (PRINIVIL/ZESTRIL) 20 MG tablet Take 1 tablet (20 mg) by mouth daily 90 tablet 2     metoclopramide (REGLAN) 10 MG tablet Take 1-2 tabs as needed       MULTIVITAMINS OR TABS ONE DAILY 100 3     nitroGLYcerin (NITROSTAT) 0.4 MG sublingual tablet Place 1 tablet (0.4 mg) under the tongue every 5 minutes as needed for chest pain (no more than 3 in one hour; after 3rd, call 911.) 25 tablet 3     nystatin (MYCOSTATIN) cream Apply topically daily as needed        nystatin-triamcinolone (MYCOLOG II) cream Apply topically daily as needed       ondansetron (ZOFRAN) 4 MG tablet Take 1 tablet by mouth every 6 hours as needed Reported on 3/20/2017       order for DME Equipment being ordered: Compression stockings - Knee High; 20-30 mmHg compression 2 each 0     order for DME Donut Pillow 1 each 0     order for DME Equipment being ordered: Oral appliance for sleep apnea 1 Units 0     pantoprazole (PROTONIX) 40 MG EC tablet Take 40 mg by mouth 2 times daily       pitavastatin ( LIVALO) 2  MG TABS tablet Take 1 tablet (2 mg) by mouth daily 30 tablet 11     sitagliptin (JANUVIA) 50 MG tablet Take 50 mg by mouth daily       triamcinolone (KENALOG) 0.1 % cream Apply topically daily         ALLERGIES     Allergies   Allergen Reactions     Nsaids Difficulty breathing     Increased creatinine     Toradol Difficulty breathing     Shortness of breath     Celecoxib Itching and Rash     Codeine Itching     With higher doses     Crestor [Rosuvastatin] Muscle Pain (Myalgia)     No Clinical Screening - See Comments Itching     Fragrance     Vioxx Other (See Comments)     Heart races     Conjugated Estrogens Rash     Sulfa Drugs Rash       PAST MEDICAL HISTORY:  Past Medical History:   Diagnosis Date     Abdominal adhesions 1984, 96,99    s/p lysis     Allergic rhinitis, cause unspecified      Carotid stenosis      CPAP (continuous positive airway pressure) dependence      Diabetic gastroparesis (H)      Diet-controlled type 2 diabetes mellitus (H)      DVT of axillary vein, acute right (H)      Fibromyalgia      Gastro-oesophageal reflux disease      Hernia of unspecified site of abdominal cavity without mention of obstruction or gangrene     bilateral     HTN (hypertension)      Hypertriglyceridemia      Obstructive sleep apnea      ANNETTE (obstructive sleep apnea)      Papillary carcinoma of thyroid (H)     s/p thyroidectomy - Ruegemer     PE (pulmonary embolism)      Poliomyelitis     poor circulation right leg     Postsurgical hypothyroidism     s/p papillary thryoid cancer - Ruegemer     Pulmonary embolism (H)      Rosacea      S/P carpal tunnel release     bilateral     S/P hardware removal 01/2014    still with lingering foot pain     S/P shoulder surgery     bilateral     Septic joint (H)     right knee     Venous insufficiency      Venous thrombosis 1999    right axillary vein       PAST SURGICAL HISTORY:  Past Surgical History:   Procedure Laterality Date     AMPUTATE TOE(S)  3/15/2012    Procedure:AMPUTATE  TOE(S); Surgeon:SUKHDEEP METZ; Location: OR     APPENDECTOMY  1972     ARTHRODESIS FOOT  3/15/2012    Procedure:ARTHRODESIS FOOT; RIGHT TRIPLE ARTHRODESIS, FIFTH TOE AMPUTATION, LATERAL LIGAMENT RECONSTRUCTION, TENDON TRANSFER AND RELEASE [MINI C-ARM, ARTHREX 4.5 AND 6.7 STAPLES, BIOCOMPOSITE TENODESIS SCREWS]; Surgeon:SUKHDEEP METZ; Location: OR     C FREEING BOWEL ADHESION,ENTEROLYSIS      1986, 1996, 1999     C NONSPECIFIC PROCEDURE      throidectomy     C TOTAL ABDOM HYSTERECTOMY  1980    + BSO     CHOLECYSTECTOMY       COLOSTOMY  2/7/2012    Procedure:COLOSTOMY; CREATION OF SIGMOID COLOSTOMY AND EXTENSIVE  LYSIS OF ADHESIONS; Surgeon:MONTSERRAT BENDER; Location: OR     GI SURGERY      weakened rectal sphincter with artificial stimulator     LAPAROTOMY, LYSIS ADHESIONS, COMBINED  2/7/2012    Procedure:COMBINED LAPAROTOMY, LYSIS ADHESIONS; Surgeon:MONTSERRAT BENDER; Location: OR     RELEASE TENDON FOOT  3/15/2012    Procedure:RELEASE TENDON FOOT; Surgeon:SUKHDEEP METZ; Location: OR     REMOVE HARDWARE FOOT  12/13/2012    Procedure: REMOVE HARDWARE FOOT;  RIGHT FOOT REMOVAL OF HARDWARE;  Surgeon: Sukhdeep Metz MD;  Location:  OR       FAMILY HISTORY:  Family History   Problem Relation Age of Onset     Arthritis Mother      Hypertension Mother      Cerebrovascular Disease Mother      Obesity Mother      HEART DISEASE Mother      MI's     Hypertension Father      Respiratory Father      Adult RDS     Diabetes Father      adult     Arthritis Sister      Cancer Sister      Arthritis Sister      Hypertension Sister      Cancer Sister      colon polup     HEART DISEASE Brother      MI at 54     Hypertension Sister      Lipids Brother      Hypertension Brother      Lipids Sister      Obesity Sister      Obesity Maternal Grandmother      Skin Cancer Maternal Grandmother      skin cancer unknown     Cancer Maternal Grandmother      unknown skin cancer on face  "      SOCIAL HISTORY:  Social History     Social History     Marital status:      Spouse name: N/A     Number of children: N/A     Years of education: N/A     Social History Main Topics     Smoking status: Never Smoker     Smokeless tobacco: Never Used     Alcohol use No      Comment: very rare     Drug use: No     Sexual activity: Not Currently     Partners: Male     Birth control/ protection: Female Surgical     Other Topics Concern     Caffeine Concern Yes     occ     Sleep Concern Yes     Stress Concern Yes     Special Diet Yes     low carb, low sugar      Exercise No     occ. 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[employer]        Review of Systems:  Skin:  Negative       Eyes:  Positive for glasses    ENT:  Negative      Respiratory:  Positive for sleep apnea oral appliance   Cardiovascular:  Negative      Gastroenterology: Negative      Genitourinary:  Negative      Musculoskeletal:  Positive for arthritis    Neurologic:  Positive for numbness or tingling of hands occ  Psychiatric:  Positive for anxiety    Heme/Lymph/Imm:  Positive for   seasonal  Endocrine:  Positive for thyroid disorder;diabetes      Physical Exam:  Vitals: /70  Pulse 60  Ht 1.562 m (5' 1.5\")  Wt 91.6 kg (202 lb)  BMI 37.55 kg/m2    Constitutional:  cooperative, alert and oriented, well developed, well nourished, in no acute distress        Skin:  warm and dry to the touch          Head:  normocephalic        Eyes:  sclera white        Lymph:      ENT:           Neck:           Respiratory:  normal breath sounds, clear to auscultation, normal A-P diameter, normal symmetry, normal respiratory excursion, no use of accessory muscles         Cardiac: regular rhythm;normal S1 and S2;no S3 or S4     no presence of murmur                                                   GI:           Extremities and Muscular Skeletal:      bilateral LE " edema;trace     RUE mild edema at shoulder    Neurological:    limb weakness uses cane    Psych:  Alert and Oriented x 3;affect appropriate, oriented to time, person and place          CC  Andreea Ortiz MD  [address]  [place] [id number]

## 2018-06-27 NOTE — MR AVS SNAPSHOT
After Visit Summary   6/27/2018    Chasity Hodgson    MRN: [id number]           Patient Information     Date Of Birth          1945        Visit Information        Provider Department      6/27/2018 9:15 AM Andreea Ortiz MD [facility]        Today's Diagnoses     Atypical chest pain           Follow-ups after your visit        Additional Services     Follow-Up with Cardiologist       If Dr. Ortiz not available, may see Dr. Henriquez.                  Your next 10 appointments already scheduled     Sep 10, 2018 10:30 AM CDT   (Arrive by 10:15 AM)   RETURN [facility]IR[facility]OSS with Renetta Meyer MD   [facility] ([facility] [place])    [address]  [address] [facility] [address] [phone]              Who to contact     If you have questions or need follow up information about today's clinic visit or your schedule please contact [facility]   LEE [facility] directly at [phone].  Normal or non-critical lab and imaging results will be communicated to you by Notehallhart, letter or phone within 4 business days after the clinic has received the results. If you do not hear from us within 7 days, please contact the clinic through [employer] or phone. If you have a critical or abnormal lab result, we will notify you by phone as soon as possible.  Submit refill requests through [employer] or call your pharmacy and they will forward the refill request to us. Please allow 3 business days for your refill to be completed.          Additional Information About Your Visit        MyChart Information     [employer] gives you secure access to your electronic health record. If you see a primary care provider, you can also send messages to your care team and make appointments. If you have questions, please call your primary care clinic.  If you do not have a primary care provider, please call [phone] and they will  "assist you.        Care EveryWhere ID     This is your Care EveryWhere ID. This could be used by other organizations to access your [place] medical records  [id number]        Your Vitals Were     Pulse Height BMI (Body Mass Index)             60 1.562 m (5' 1.5\") 37.55 kg/m2          Blood Pressure from Last 3 Encounters:   No data found for BP    Weight from Last 3 Encounters:   No data found for Wt              Today, you had the following     No orders found for display       Primary Care Provider Office Phone # Fax #    Shola Benoit [phone] [address] [facility] [address]  [facility] [address]        Equal Access to Services     [facility] : Hadii domi Olea, waaxda srinathadaha, qaybta kaalmada adejosé miguelyada, jose baldwin . So [facility] [phone].    ATENCIÓN: Si habla español, tiene a modi disposición servicios gratuitos de asistencia lingüística. Llame al [phone].    We comply with applicable federal civil rights laws and [place] laws. We do not discriminate on the basis of race, color, national origin, age, disability, sex, sexual orientation, or gender identity.            Thank you!     Thank you for choosing [facility]  for your care. Our goal is always to provide you with excellent care. Hearing back from our patients is one way we can continue to improve our services. Please take a few minutes to complete the written survey that you may receive in the mail after your visit with us. Thank you!             Your Updated Medication List - Protect others around you: Learn how to safely use, store and throw away your medicines at www.disposemymeds.org.          This list is accurate as of 6/27/18 11:59 PM.  Always use your most recent med list.                   Brand Name Dispense Instructions for use Diagnosis    ascorbic acid 1000 MG Tabs    vitamin C     Take 1,000 mg by mouth daily        ASTEPRO NA "           atenolol 50 MG tablet    TENORMIN    180 tablet    Take 1 tablet (50 mg) by mouth 2 times daily    Essential hypertension       BIOTIN PO      Take 1,000 mcg by mouth        blood glucose monitoring test strip    no brand specified    1 Box    Use to test blood sugar 1 times daily or as directed.    Type 2 diabetes mellitus with other specified complication (H)       clotrimazole 1 % cream    LOTRIMIN     Apply topically daily        cycloSPORINE 0.05 % ophthalmic emulsion    RESTASIS     1 drop 2 times daily        diphenhydrAMINE-acetaminophen  MG tablet    TYLENOL PM     Take 1 tablet by mouth At Bedtime Reported on 3/20/2017        famotidine 40 MG tablet    PEPCID     Take 40 mg by mouth At Bedtime        fenofibrate 145 MG tablet     90 tablet    Take 1 tablet (145 mg) by mouth daily    CARDIOVASCULAR SCREENING; LDL GOAL LESS THAN 130       ferrous sulfate 325 (65 Fe) MG tablet    IRON     Take 325 mg by mouth daily (with breakfast)        fexofenadine 180 MG tablet    ALLEGRA    120    Take 180 mg by mouth daily.        fluticasone 50 MCG/ACT spray    FLONASE    1 Bottle    Spray 2 sprays in nostril daily 2 sprays in each nostril qd    Seasonal allergic rhinitis due to other allergic trigger       gabapentin 100 MG capsule    NEURONTIN    90 capsule    Take 1 capsule (100 mg) by mouth every evening as needed    Poliomyelitis       hydrochlorothiazide 25 MG tablet    HYDRODIURIL    90 tablet    Take 1 tablet (25 mg) by mouth daily    Essential hypertension       lisinopril 20 MG tablet    PRINIVIL/ZESTRIL    90 tablet    Take 1 tablet (20 mg) by mouth daily    Essential hypertension       metoclopramide 10 MG tablet    REGLAN     Take 1-2 tabs as needed        multivitamin per tablet     100    ONE DAILY        nitroGLYcerin 0.4 MG sublingual tablet    NITROSTAT    25 tablet    Place 1 tablet (0.4 mg) under the tongue every 5 minutes as needed for chest pain (no more than 3 in one hour; after  3rd, call 911.)    Atypical chest pain       nystatin cream    MYCOSTATIN     Apply topically daily as needed        nystatin-triamcinolone cream    MYCOLOG II     Apply topically daily as needed        ondansetron 4 MG tablet    ZOFRAN     Take 1 tablet by mouth every 6 hours as needed Reported on 3/20/2017        * order for DME     1 Units    Equipment being ordered: Oral appliance for sleep apnea    ANNETTE (obstructive sleep apnea)       * order for DME     1 each    Donut Pillow    Coccydynia       * order for DME     2 each    Equipment being ordered: Compression stockings - Knee High; 20-30 mmHg compression    Insufficiency, arterial, peripheral (H)       pantoprazole 40 MG EC tablet    PROTONIX     Take 40 mg by mouth 2 times daily        pitavastatin 2 MG Tabs tablet     LIVALO    30 tablet    Take 1 tablet (2 mg) by mouth daily    Hyperlipidemia LDL goal <100       SB LOW DOSE ASA EC 81 MG EC tablet   Generic drug:  aspirin      Take 81 mg by mouth daily        sitagliptin 50 MG tablet    JANUVIA     Take 50 mg by mouth daily        SYNTHROID 112 MCG tablet   Generic drug:  levothyroxine      Take 112 mcg by mouth daily Take 112 mcg daily except for Friday takes an additional 56 mcg.  Brand name Synthroid        TOUJEO SOLOSTAR 300 UNIT/ML injection   Generic drug:  insulin glargine U-300      Inject 34 Units Subcutaneous every morning        triamcinolone 0.1 % cream    KENALOG     Apply topically daily        VITAMIN D-3 PO      Take 2 tablets by mouth        * Notice:  This list has 3 medication(s) that are the same as other medications prescribed for you. Read the directions carefully, and ask your doctor or other care provider to review them with you.

## 2018-06-27 NOTE — TELEPHONE ENCOUNTER
Prescription approved per [facility] Refill Protocol.  Darya Zamarripa RN  Goal is ldl 130  LDL Cholesterol Calculated   Date Value Ref Range Status   01/29/2018 105 (A) <100 mg/dL Final   02/15/2017 49 <130 mg/dL Final

## 2018-06-27 NOTE — LETTER
6/27/2018    Shola Benoit MD, MD  [address]  [facility] [address]    RE: Chasity Hodgosn       Dear Colleague,    I had the pleasure of seeing Chasity Hodgson in the [facility].    HPI and Plan:    Ms. Chasity Hodgson was seen in followup at [facility] in [place].  She is now 72 years old and has previously been seen for atypical chest discomfort, hypertension and lower extremity edema.      In the past, she has been evaluated for atypical chest discomfort with CT coronary angiography eventually demonstrating an absence of obstructive coronary disease.  That CT angiogram was performed in 2011.  Her last stress nuclear study was in 2014 at which time there was no evidence of ischemia or infarct.  She has normal left ventricular systolic performance.      She does have evidence of plaque in her lower extremity arterial circulation.  There was no evidence of obstructive disease based on Dr. Lazo's evaluation in 2016.  She underwent Holter monitoring before her last visit which she requested for re-evaluation of chest discomfort.  The Holter monitor demonstrated  normal sinus rhythm with average heart rate variability and isolated PVCs of which there were only 2 and rare PACs.      Last Spring, she developed chest pressure and nausea leading to some vomiting. She has had episodes that have been similar. The computer when she is supine and in bed. If she rolls to her side, the symptom resolves. This does not occur every night but rather occurs intermittently. She is seeing Dr. Pantoja of gastroenterology and she is on Protonix. She has not tried either antacids or nitroglycerin with the episodes. The episodes are unassociated with shortness of breath or radiation of the discomfort.    Eventually, she underwent stress echocardiography. She said she would be able to exercise despite her right leg brace and her history of polio. She was able to exercise 4 minutes on  "the Ankit protocol which actually represents a significant effort given her low stature. She reached only 71% of her age-predicted heart rate (but continued on beta blockade for the study) and there was no ischemia or reproduction of chest discomfort.     Her blood pressure has previously been elevated and now has been improved She continues on atenolol, lisinopril and low-dose hydrochlorothiazide.      She does have a history of carotid disease as well and I note that in 2015 Dr. Benoit ordered a carotid ultrasound which demonstrated a 50-69% stenosis in the left internal carotid and a less than 50% stenosis in the right internal carotid artery.  She does continue on low dose aspirin, statin therapy and the ACE inhibition and beta blockade described above.  She has known normal left ventricular systolic performance.  Her last LDL fraction was not measurable given the rise in TG's and she has a low HDL of 31 and total cholesterol of 202 mg/dL.  Her history is also significant for diabetes mellitus, reactive airway disease, a prior history of polio, a prior colostomy and a prior history of thyroid cancer.      PHYSICAL EXAMINATION:     GENERAL:  This is a woman who appears younger than her stated age.   VITAL SIGNS:Blood pressure 148/70, pulse 60, height 1.562 m (5' 1.5\"), weight 91.6 kg (202 lb), not currently breastfeeding.    ;respiratory rate 14-18 per minute.   CHEST:  Clear to auscultation.   CARDIAC:  On cardiac auscultation, there was an S1 and an S2 without extra sounds or a murmur.  The rhythm was regular.      ASSESSMENT:  Ms. Hodgson had atypical chest discomfort associated with a very stressful event.  We discussed it at length again. Given her performance on the stress echocardiogram, it seems less likely that her symptoms represent coronary ischemia.    She agreed. I did prescribe sublingual nitroglycerin and asked her to try the nitroglycerin several times to see if the symptoms are relieved within " minutes. I also suggested that she try Tums for some of the episodes. This could improve our understanding of the etiology of the episodes. On    If her symptoms are relieved with nitroglycerin and not with antacids, then I would recommend further evaluation. I would probably start with a CT coronary angiogram.  If she develops typical angina which has an exertional or supine component, I would recommend coronary angiography.  To follow up on these symptoms, I recommended a return at 6 weeks with Marilu Lemos, nurse practitioner. She will follow-up with myself in 4 months.     With regard to risk factor intervention, she is now experiencing an increase in triglycerides. Her LDL has previously been low. Likely the rise in triglycerides in August was related to her temporary discontinuation of metformin and an elevation of her hemoglobin A1c.     With regard to her history of hypertension, her blood pressure would appear to be controlled.  I suspect it was not as well controlled during the events which she described and that likely contributed to her symptoms.           Orders Placed This Encounter   Procedures     Follow-Up with Cardiologist       No orders of the defined types were placed in this encounter.      There are no discontinued medications.      Encounter Diagnosis   Name Primary?     Atypical chest pain        CURRENT MEDICATIONS:  Current Outpatient Prescriptions   Medication Sig Dispense Refill     ascorbic acid (VITAMIN C) 1000 MG TABS Take 1,000 mg by mouth daily       aspirin (SB LOW DOSE ASA EC) 81 MG EC tablet Take 81 mg by mouth daily       atenolol (TENORMIN) 50 MG tablet Take 1 tablet (50 mg) by mouth 2 times daily 180 tablet 3     Azelastine HCl (ASTEPRO NA)        BIOTIN PO Take 1,000 mcg by mouth       blood glucose monitoring (NO BRAND SPECIFIED) test strip Use to test blood sugar 1 times daily or as directed. 1 Box 6     Cholecalciferol (VITAMIN D-3 PO) Take 2 tablets by mouth        clotrimazole (LOTRIMIN) 1 % cream Apply topically daily       cycloSPORINE (RESTASIS) 0.05 % ophthalmic emulsion 1 drop 2 times daily       diphenhydrAMINE-acetaminophen (TYLENOL PM)  MG tablet Take 1 tablet by mouth At Bedtime Reported on 3/20/2017       famotidine (PEPCID) 40 MG tablet Take 40 mg by mouth At Bedtime       fenofibrate 145 MG tablet Take 1 tablet (145 mg) by mouth daily 90 tablet 2     ferrous sulfate (IRON) 325 (65 FE) MG tablet Take 325 mg by mouth daily (with breakfast)       fexofenadine (ALLEGRA) 180 MG tablet Take 180 mg by mouth daily. 120 0     fluticasone (FLONASE) 50 MCG/ACT spray Spray 2 sprays in nostril daily 2 sprays in each nostril qd 1 Bottle 0     gabapentin (NEURONTIN) 100 MG capsule Take 1 capsule (100 mg) by mouth every evening as needed 90 capsule 3     hydrochlorothiazide (HYDRODIURIL) 25 MG tablet Take 1 tablet (25 mg) by mouth daily 90 tablet 3     insulin glargine U-300 (TOUJEO SOLOSTAR) 300 UNIT/ML injection Inject 34 Units Subcutaneous every morning        levothyroxine (SYNTHROID) 112 MCG tablet Take 112 mcg by mouth daily Take 112 mcg daily except for Friday takes an additional 56 mcg.  Brand name Synthroid       lisinopril (PRINIVIL/ZESTRIL) 20 MG tablet Take 1 tablet (20 mg) by mouth daily 90 tablet 2     metoclopramide (REGLAN) 10 MG tablet Take 1-2 tabs as needed       MULTIVITAMINS OR TABS ONE DAILY 100 3     nitroGLYcerin (NITROSTAT) 0.4 MG sublingual tablet Place 1 tablet (0.4 mg) under the tongue every 5 minutes as needed for chest pain (no more than 3 in one hour; after 3rd, call 911.) 25 tablet 3     nystatin (MYCOSTATIN) cream Apply topically daily as needed        nystatin-triamcinolone (MYCOLOG II) cream Apply topically daily as needed       ondansetron (ZOFRAN) 4 MG tablet Take 1 tablet by mouth every 6 hours as needed Reported on 3/20/2017       order for DME Equipment being ordered: Compression stockings - Knee High; 20-30 mmHg compression 2 each 0      order for DME Donut Pillow 1 each 0     order for DME Equipment being ordered: Oral appliance for sleep apnea 1 Units 0     pantoprazole (PROTONIX) 40 MG EC tablet Take 40 mg by mouth 2 times daily       pitavastatin ( LIVALO) 2 MG TABS tablet Take 1 tablet (2 mg) by mouth daily 30 tablet 11     sitagliptin (JANUVIA) 50 MG tablet Take 50 mg by mouth daily       triamcinolone (KENALOG) 0.1 % cream Apply topically daily         ALLERGIES     Allergies   Allergen Reactions     Nsaids Difficulty breathing     Increased creatinine     Toradol Difficulty breathing     Shortness of breath     Celecoxib Itching and Rash     Codeine Itching     With higher doses     Crestor [Rosuvastatin] Muscle Pain (Myalgia)     No Clinical Screening - See Comments Itching     Fragrance     Vioxx Other (See Comments)     Heart races     Conjugated Estrogens Rash     Sulfa Drugs Rash       PAST MEDICAL HISTORY:  Past Medical History:   Diagnosis Date     Abdominal adhesions 1984, 96,99    s/p lysis     Allergic rhinitis, cause unspecified      Carotid stenosis      CPAP (continuous positive airway pressure) dependence      Diabetic gastroparesis (H)      Diet-controlled type 2 diabetes mellitus (H)      DVT of axillary vein, acute right (H)      Fibromyalgia      Gastro-oesophageal reflux disease      Hernia of unspecified site of abdominal cavity without mention of obstruction or gangrene     bilateral     HTN (hypertension)      Hypertriglyceridemia      Obstructive sleep apnea      ANNETTE (obstructive sleep apnea)      Papillary carcinoma of thyroid (H)     s/p thyroidectomy - Ruegemer     PE (pulmonary embolism)      Poliomyelitis     poor circulation right leg     Postsurgical hypothyroidism     s/p papillary thryoid cancer - Ruegemer     Pulmonary embolism (H)      Rosacea      S/P carpal tunnel release     bilateral     S/P hardware removal 01/2014    still with lingering foot pain     S/P shoulder surgery     bilateral     Septic  joint (H)     right knee     Venous insufficiency      Venous thrombosis 1999    right axillary vein       PAST SURGICAL HISTORY:  Past Surgical History:   Procedure Laterality Date     AMPUTATE TOE(S)  3/15/2012    Procedure:AMPUTATE TOE(S); Surgeon:SUKHDEEP METZ; Location: OR     APPENDECTOMY  1972     ARTHRODESIS FOOT  3/15/2012    Procedure:ARTHRODESIS FOOT; RIGHT TRIPLE ARTHRODESIS, FIFTH TOE AMPUTATION, LATERAL LIGAMENT RECONSTRUCTION, TENDON TRANSFER AND RELEASE [MINI C-ARM, ARTHREX 4.5 AND 6.7 STAPLES, BIOCOMPOSITE TENODESIS SCREWS]; Surgeon:SUKHDEEP METZ; Location: OR     C FREEING BOWEL ADHESION,ENTEROLYSIS      1986, 1996, 1999     C NONSPECIFIC PROCEDURE      throidectomy     C TOTAL ABDOM HYSTERECTOMY  1980    + BSO     CHOLECYSTECTOMY       COLOSTOMY  2/7/2012    Procedure:COLOSTOMY; CREATION OF SIGMOID COLOSTOMY AND EXTENSIVE  LYSIS OF ADHESIONS; Surgeon:MONTSERRAT BENDER; Location: OR     GI SURGERY      weakened rectal sphincter with artificial stimulator     LAPAROTOMY, LYSIS ADHESIONS, COMBINED  2/7/2012    Procedure:COMBINED LAPAROTOMY, LYSIS ADHESIONS; Surgeon:MONTSERRAT BENDER; Location: OR     RELEASE TENDON FOOT  3/15/2012    Procedure:RELEASE TENDON FOOT; Surgeon:SUKHDEEP METZ; Location: OR     REMOVE HARDWARE FOOT  12/13/2012    Procedure: REMOVE HARDWARE FOOT;  RIGHT FOOT REMOVAL OF HARDWARE;  Surgeon: Sukhdeep Metz MD;  Location:  OR       FAMILY HISTORY:  Family History   Problem Relation Age of Onset     Arthritis Mother      Hypertension Mother      Cerebrovascular Disease Mother      Obesity Mother      HEART DISEASE Mother      MI's     Hypertension Father      Respiratory Father      Adult RDS     Diabetes Father      adult     Arthritis Sister      Cancer Sister      Arthritis Sister      Hypertension Sister      Cancer Sister      colon polup     HEART DISEASE Brother      MI at 54     Hypertension Sister      Lipids  "Brother      Hypertension Brother      Lipids Sister      Obesity Sister      Obesity Maternal Grandmother      Skin Cancer Maternal Grandmother      skin cancer unknown     Cancer Maternal Grandmother      unknown skin cancer on face       SOCIAL HISTORY:  Social History     Social History     Marital status:      Spouse name: N/A     Number of children: N/A     Years of education: N/A     Social History Main Topics     Smoking status: Never Smoker     Smokeless tobacco: Never Used     Alcohol use No      Comment: very rare     Drug use: No     Sexual activity: Not Currently     Partners: Male     Birth control/ protection: Female Surgical     Other Topics Concern     Caffeine Concern Yes     occ     Sleep Concern Yes     Stress Concern Yes     Special Diet Yes     low carb, low sugar      Exercise No     occ. 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[employer]        Review of Systems:  Skin:  Negative       Eyes:  Positive for glasses    ENT:  Negative      Respiratory:  Positive for sleep apnea oral appliance   Cardiovascular:  Negative      Gastroenterology: Negative      Genitourinary:  Negative      Musculoskeletal:  Positive for arthritis    Neurologic:  Positive for numbness or tingling of hands occ  Psychiatric:  Positive for anxiety    Heme/Lymph/Imm:  Positive for   seasonal  Endocrine:  Positive for thyroid disorder;diabetes      Physical Exam:  Vitals: /70  Pulse 60  Ht 1.562 m (5' 1.5\")  Wt 91.6 kg (202 lb)  BMI 37.55 kg/m2    Constitutional:  cooperative, alert and oriented, well developed, well nourished, in no acute distress        Skin:  warm and dry to the touch          Head:  normocephalic        Eyes:  sclera white        Lymph:      ENT:           Neck:           Respiratory:  normal breath sounds, clear to auscultation, normal A-P diameter, normal symmetry, normal respiratory excursion, " no use of accessory muscles         Cardiac: regular rhythm;normal S1 and S2;no S3 or S4     no presence of murmur                                                   GI:           Extremities and Muscular Skeletal:      bilateral LE edema;trace     RUE mild edema at shoulder    Neurological:    limb weakness uses cane    Psych:  Alert and Oriented x 3;affect appropriate, oriented to time, person and place        Thank you for allowing me to participate in the care of your patient.    Sincerely,     Andreea Ortiz MD     [facility]

## 2018-07-06 ENCOUNTER — TELEPHONE (OUTPATIENT)
Dept: FAMILY MEDICINE | Facility: CLINIC | Age: 73
End: 2018-07-06

## 2018-07-06 DIAGNOSIS — M53.3 COCCYDYNIA: ICD-10-CM

## 2018-07-06 DIAGNOSIS — I73.9 INSUFFICIENCY, ARTERIAL, PERIPHERAL (H): ICD-10-CM

## 2018-07-06 NOTE — TELEPHONE ENCOUNTER
Reason for Call:  Medication or medication refill:    Do you use a [facility]?  Name of the pharmacy and phone number for the current request:       [facility]      Name of the medication requested: Compression Stockings, Donut pillow    Other request: Please call her once this has been addressed    Can we leave a detailed message on this number? YES    Phone number patient can be reached at: Home number on file [phone] (home)    Best Time: anytime    Call taken on 7/6/2018 at 10:48 AM by Jimmie Gabriel

## 2018-07-23 ENCOUNTER — TRANSFERRED RECORDS (OUTPATIENT)
Dept: HEALTH INFORMATION MANAGEMENT | Facility: CLINIC | Age: 73
End: 2018-07-23

## 2018-07-23 DIAGNOSIS — I10 ESSENTIAL HYPERTENSION: ICD-10-CM

## 2018-07-24 NOTE — TELEPHONE ENCOUNTER
"Last Written Prescription Date:  10/25/17  Last Fill Quantity: 90 tablet,  # refills: 2   Last office visit: 6/1/2018 with prescribing provider:  Kaycee   Future Office Visit:      Requested Prescriptions   Pending Prescriptions Disp Refills     lisinopril (PRINIVIL/ZESTRIL) 20 MG tablet 90 tablet 2     Sig: Take 1 tablet (20 mg) by mouth daily    ACE Inhibitors (Including Combos) Protocol Failed    7/23/2018  3:38 PM       Failed - Blood pressure under 140/90 in past 12 months    BP Readings from Last 3 Encounters:   06/27/18 148/70   06/01/18 138/80   05/08/18 177/67                Failed - Normal serum creatinine on file in past 12 months    Recent Labs   Lab Test  05/08/18   1742   CR  1.19*            Passed - Recent (12 mo) or future (30 days) visit within the authorizing provider's specialty    Patient had office visit in the last 12 months or has a visit in the next 30 days with authorizing provider or within the authorizing provider's specialty.  See \"Patient Info\" tab in inbasket, or \"Choose Columns\" in Meds & Orders section of the refill encounter.           Passed - Patient is age 18 or older       Passed - No active pregnancy on record       Passed - Normal serum potassium on file in past 12 months    Recent Labs   Lab Test  05/08/18   1742   POTASSIUM  4.3            Passed - No positive pregnancy test in past 12 months          "

## 2018-07-25 RX ORDER — LISINOPRIL 20 MG/1
20 TABLET ORAL DAILY
Qty: 90 TABLET | Refills: 2 | Status: CANCELLED | OUTPATIENT
Start: 2018-07-25

## 2018-07-25 RX ORDER — LISINOPRIL 20 MG/1
20 TABLET ORAL DAILY
Qty: 90 TABLET | Refills: 2 | Status: SHIPPED | OUTPATIENT
Start: 2018-07-25 | End: 2019-03-29

## 2018-07-25 NOTE — TELEPHONE ENCOUNTER
Routing refill request to provider for review/approval because:  Labs out of range:  Creatinine and BP per RN Protocol     Please review and authorize if appropriate,     Thank you,   Emili VALENCIA RN

## 2018-08-01 ENCOUNTER — OFFICE VISIT (OUTPATIENT)
Dept: FAMILY MEDICINE | Facility: CLINIC | Age: 73
End: 2018-08-01
Payer: MEDICARE

## 2018-08-01 VITALS
OXYGEN SATURATION: 100 % | SYSTOLIC BLOOD PRESSURE: 134 MMHG | TEMPERATURE: 97.2 F | HEIGHT: 62 IN | HEART RATE: 60 BPM | DIASTOLIC BLOOD PRESSURE: 70 MMHG | WEIGHT: 202 LBS | BODY MASS INDEX: 37.17 KG/M2

## 2018-08-01 DIAGNOSIS — E66.01 MORBID OBESITY (H): ICD-10-CM

## 2018-08-01 DIAGNOSIS — F32.0 MILD MAJOR DEPRESSION (H): ICD-10-CM

## 2018-08-01 DIAGNOSIS — M25.562 ARTHRALGIA OF LEFT KNEE: ICD-10-CM

## 2018-08-01 DIAGNOSIS — M79.10 MYALGIA: Primary | ICD-10-CM

## 2018-08-01 LAB
CRP SERPL-MCNC: 7.2 MG/L (ref 0–8)
ERYTHROCYTE [DISTWIDTH] IN BLOOD BY AUTOMATED COUNT: 13.1 % (ref 10–15)
ERYTHROCYTE [SEDIMENTATION RATE] IN BLOOD BY WESTERGREN METHOD: 15 MM/H (ref 0–30)
HCT VFR BLD AUTO: 35.8 % (ref 35–47)
HGB BLD-MCNC: 12 G/DL (ref 11.7–15.7)
MCH RBC QN AUTO: 30.3 PG (ref 26.5–33)
MCHC RBC AUTO-ENTMCNC: 33.5 G/DL (ref 31.5–36.5)
MCV RBC AUTO: 90 FL (ref 78–100)
PLATELET # BLD AUTO: 241 10E9/L (ref 150–450)
RBC # BLD AUTO: 3.96 10E12/L (ref 3.8–5.2)
WBC # BLD AUTO: 7.8 10E9/L (ref 4–11)

## 2018-08-01 PROCEDURE — 85652 RBC SED RATE AUTOMATED: CPT | Performed by: INTERNAL MEDICINE

## 2018-08-01 PROCEDURE — 85027 COMPLETE CBC AUTOMATED: CPT | Performed by: INTERNAL MEDICINE

## 2018-08-01 PROCEDURE — 36415 COLL VENOUS BLD VENIPUNCTURE: CPT | Performed by: INTERNAL MEDICINE

## 2018-08-01 PROCEDURE — 82550 ASSAY OF CK (CPK): CPT | Performed by: INTERNAL MEDICINE

## 2018-08-01 PROCEDURE — 80053 COMPREHEN METABOLIC PANEL: CPT | Performed by: INTERNAL MEDICINE

## 2018-08-01 PROCEDURE — 86140 C-REACTIVE PROTEIN: CPT | Performed by: INTERNAL MEDICINE

## 2018-08-01 PROCEDURE — 86431 RHEUMATOID FACTOR QUANT: CPT | Performed by: INTERNAL MEDICINE

## 2018-08-01 PROCEDURE — 86618 LYME DISEASE ANTIBODY: CPT | Performed by: INTERNAL MEDICINE

## 2018-08-01 PROCEDURE — 99214 OFFICE O/P EST MOD 30 MIN: CPT | Performed by: INTERNAL MEDICINE

## 2018-08-01 NOTE — PROGRESS NOTES
"  SUBJECTIVE:   Chasity Hodgson is a 72 year old female who presents to clinic today for the following health issues:      Muscle pain in legs and arms    [facility]  CLINIC PROGRESS NOTE    Subjective:  Left side arm and leg pain   Chasity Hodgson has been experiencing severe muscle and joint pain in both left leg and arm.  Pain aches at rest.  Using the extremity makes it worse.  Having pain in thigh, hip, knee and shin as well as left shoulder and tricep.    Chest ache   Chasity Hodgson has had noticeable strange ache in the upper chest that comes and goes.  She has been taking pantoprazole twice per day.  At times she feels hot and at times feels cold.   Feeling overwhelmed   Chasity Hodgson has felt overwhelmed as she has many responsibilities with home work and has she is also taking care of her  who has Parkinson's.  She is also trying to continue to work 0.8.  She is hoping to retire within a year or two      Past medical history, medications, allergies, social history, family history reviewed and updated in [facility] as of 8/1/2018 .    ROS  CONSTITUTIONAL: + fatigue, no unexpected change in weight  SKIN: no worrisome rashes, no worrisome moles, no worrisome lesions  EYES: no acute vision problems or changes  ENT: no ear problems, no mouth problems, no throat problems  RESP: no significant cough, no shortness of breath  CV:  no palpitations, no new or worsening peripheral edema  GI: no nausea, no vomiting, no constipation, no diarrhea  : no frequency, no dysuria, no hematuria  MS: as above   PSYCHIATRIC: mood depressed -she is overwhelmed with her 's illness and her responsibilities.  She denies any suicidal ideation      Objective:  Vitals  /70  Pulse 60  Temp 97.2  F (36.2  C) (Oral)  Ht 5' 1.5\" (1.562 m)  Wt 202 lb (91.6 kg)  SpO2 100%  Breastfeeding? No  BMI 37.55 kg/m2  GEN: Alert Oriented x3 NAD  HEENT: Atraumatic, normocephalic, neck supple,   CV: RRR no murmurs or " rubs  PULM: CTA no wheezes or crackles  ABD: Soft, nontender nondistended,   SKIN: No visible skin lesion or ulcerations  EXT:trace no edema bilateral lower extremities  Musculoskeletal: Full range of motion of left upper extremity with no tenderness appreciated, full range of motion of her left lower leg as well  NEURO: Gait and station deferred, No focal neurologic deficits  PSYCH: Mood depressed, affect mood congruent, tearful at times    No images are attached to the encounter.    Results for orders placed or performed in visit on 08/01/18 (from the past 24 hour(s))   ESR: Erythrocyte sedimentation rate   Result Value Ref Range    Sed Rate 15 0 - 30 mm/h   CBC with platelets   Result Value Ref Range    WBC 7.8 4.0 - 11.0 10e9/L    RBC Count 3.96 3.8 - 5.2 10e12/L    Hemoglobin 12.0 11.7 - 15.7 g/dL    Hematocrit 35.8 35.0 - 47.0 %    MCV 90 78 - 100 fl    MCH 30.3 26.5 - 33.0 pg    MCHC 33.5 31.5 - 36.5 g/dL    RDW 13.1 10.0 - 15.0 %    Platelet Count 241 150 - 450 10e9/L       Assessment/Plan:  Patient Instructions   (M79.1) Myalgia  (primary encounter diagnosis)  Comment: We will check labs today including lyme titer and I would recommend stopping Livalo at this time.  Ok to use ice, acetaminophen 1000 mg up to three times daily as needed  Plan: Lyme Disease Isha with reflex to WB Serum, ESR:         Erythrocyte sedimentation rate, CRP,         inflammation, CK total, Comprehensive metabolic        panel, CBC with platelets, Rheumatoid factor            (E66.01) Morbid obesity (H)  Comment: Plan to resume exercise when able following resolution of muscle aches  Plan:     (M25.562) Arthralgia of left knee  Comment: As above - noted deconditioning   Plan: Lyme Disease Isha with reflex to WB Serum, ESR:         Erythrocyte sedimentation rate, CRP,         inflammation, CK total, Comprehensive metabolic        panel, CBC with platelets, Rheumatoid factor            Depression  Comment: feeling overwhelmed at times.   We discussed that there are support groups for dealing with Parkinson's disease.  Also referral to cognitive behavioural therapy was placed today.    Follow up pending labs    25 minutes spent with patient.  Over 50% of time counseling      Disclaimer: This note consists of symbols derived from keyboarding, dictation and/or voice recognition software. As a result, there may be errors in the script that have gone undetected. Please consider this when interpreting information found in this chart.    Shola Benoit MD  [phone]

## 2018-08-01 NOTE — MR AVS SNAPSHOT
After Visit Summary   8/1/2018    Chasity Hodgson    MRN: [id number]           Patient Information     Date Of Birth          1945        Visit Information        Provider Department      8/1/2018 11:00 AM Shola Benoit MD [facility]        Today's Diagnoses     Myalgia    -  1    Morbid obesity (H)        Arthralgia of left knee        Mild major depression (H)          Care Instructions    (M79.1) Myalgia  (primary encounter diagnosis)  Comment: We will check labs today including lyme titer and I would recommend stopping Livalo at this time.  Ok to use ice, acetaminophen 1000 mg up to three times daily as needed  Plan: Lyme Disease Isha with reflex to WB Serum, ESR:         Erythrocyte sedimentation rate, CRP,         inflammation, CK total, Comprehensive metabolic        panel, CBC with platelets, Rheumatoid factor            (E66.01) Morbid obesity (H)  Comment: Plan to resume exercise when able following resolution of muscle aches  Plan:     (M25.885) Arthralgia of left knee  Comment: As above - noted deconditioning   Plan: Lyme Disease Isha with reflex to WB Serum, ESR:         Erythrocyte sedimentation rate, CRP,         inflammation, CK total, Comprehensive metabolic        panel, CBC with platelets, Rheumatoid factor            Depression  Comment: feeling overwhelmed at times.  We discussed that there are support groups for dealing with Parkinson's disease.  Also referral to cognitive behavioural therapy was placed today.          Follow-ups after your visit        Additional Services     MENTAL HEALTH REFERRAL  - Adult; Outpatient Treatment; Individual/Couples/Family/Group Therapy/Health Psychology; [facility]: [facility] [phone]; We will contact you to schedule the appointment or please call with any questions       All scheduling is subject to the client's specific insurance plan & benefits, provider/location availability, and provider clinical  specialities.  Please arrive 15 minutes early for your first appointment and bring your completed paperwork.    Please be aware that coverage of these services is subject to the terms and limitations of your health insurance plan.  Call member services at your health plan with any benefit or coverage questions.                            Your next 10 appointments already scheduled     Sep 10, 2018 10:30 AM CDT   (Arrive by 10:15 AM)   RETURN HAIRLOSS with Renetta Meyer MD   [facility] ([facility])    [address]  [address] [facility] [address] [phone]              Who to contact     If you have questions or need follow up information about today's clinic visit or your schedule please contact [facility] directly at [phone].  Normal or non-critical lab and imaging results will be communicated to you by MyChart, letter or phone within 4 business days after the clinic has received the results. If you do not hear from us within 7 days, please contact the clinic through MyChart or phone. If you have a critical or abnormal lab result, we will notify you by phone as soon as possible.  Submit refill requests through RenÃ©Sim or call your pharmacy and they will forward the refill request to us. Please allow 3 business days for your refill to be completed.          Additional Information About Your Visit        RenÃ©Sim Information     Ren[employer]©Sim gives you secure access to your electronic health record. If you see a primary care provider, you can also send messages to your care team and make appointments. If you have questions, please call your primary care clinic.  If you do not have a primary care provider, please call [phone] and they will assist you.        Care EveryWhere ID     This is your Care EveryWhere ID. This could be used by other organizations to access your [place] medical records  [id number]        Your Vitals Were      "Pulse Temperature Height Pulse Oximetry Breastfeeding? BMI (Body Mass Index)    60 97.2  F (36.2  C) (Oral) 5' 1.5\" (1.562 m) 100% No 37.55 kg/m2       Blood Pressure from Last 3 Encounters:   08/01/18 134/70   06/27/18 148/70   06/01/18 138/80    Weight from Last 3 Encounters:   08/01/18 202 lb (91.6 kg)   06/27/18 202 lb (91.6 kg)   06/01/18 203 lb (92.1 kg)              We Performed the Following     CBC with platelets     CK total     Comprehensive metabolic panel     CRP, inflammation     ESR: Erythrocyte sedimentation rate     Lyme Disease Isha with reflex to WB Serum     MENTAL HEALTH REFERRAL  - Adult; Outpatient Treatment; Individual/Couples/Family/Group Therapy/Health Psychology[place] [facility] [place] [facility] [phone]; We will contact you to schedule the appointment or please call with any questions     Rheumatoid factor          Today's Medication Changes          These changes are accurate as of 8/1/18 11:44 AM.  If you have any questions, ask your nurse or doctor.               Stop taking these medicines if you haven't already. Please contact your care team if you have questions.     pitavastatin 2 MG Tabs tablet   Commonly known as:   LIVALO   Stopped by:  Shola Benoit MD                    Primary Care Provider Office Phone # Fax #    Shola Benoit [phone] [address]  [place] [address]        Equal Access to Services     [facility]: Hadii domi alleno Sobhavna, waaxda luqadaha, qaybta kaalmada joryyada, waxay sarah beth sloan adejosé miguel baldwin . So [facility] [phone].    ATENCIÓN: Si habla español, tiene a modi disposición servicios gratuitos de asistencia lingüística. Llame al [phone].    We comply with applicable federal civil rights laws and [place] laws. We do not discriminate on the basis of race, color, national origin, age, disability, sex, sexual orientation, or gender identity.            Thank you!     Thank you " for choosing [facility]  for your care. Our goal is always to provide you with excellent care. Hearing back from our patients is one way we can continue to improve our services. Please take a few minutes to complete the written survey that you may receive in the mail after your visit with us. Thank you!             Your Updated Medication List - Protect others around you: Learn how to safely use, store and throw away your medicines at www.disposemymeds.org.          This list is accurate as of 8/1/18 11:44 AM.  Always use your most recent med list.                   Brand Name Dispense Instructions for use Diagnosis    ascorbic acid 1000 MG Tabs    vitamin C     Take 1,000 mg by mouth daily        ASTEPRO NA           atenolol 50 MG tablet    TENORMIN    180 tablet    Take 1 tablet (50 mg) by mouth 2 times daily    Essential hypertension       BIOTIN PO      Take 1,000 mcg by mouth        blood glucose monitoring test strip    no brand specified    1 Box    Use to test blood sugar 1 times daily or as directed.    Type 2 diabetes mellitus with other specified complication (H)       clotrimazole 1 % cream    LOTRIMIN     Apply topically daily        cycloSPORINE 0.05 % ophthalmic emulsion    RESTASIS     1 drop 2 times daily        diphenhydrAMINE-acetaminophen  MG tablet    TYLENOL PM     Take 1 tablet by mouth At Bedtime Reported on 3/20/2017        famotidine 40 MG tablet    PEPCID     Take 40 mg by mouth At Bedtime        fenofibrate 145 MG tablet     90 tablet    Take 1 tablet (145 mg) by mouth daily    CARDIOVASCULAR SCREENING; LDL GOAL LESS THAN 130       ferrous sulfate 325 (65 Fe) MG tablet    IRON     Take 325 mg by mouth daily (with breakfast)        fexofenadine 180 MG tablet    ALLEGRA    120    Take 180 mg by mouth daily.        fluticasone 50 MCG/ACT spray    FLONASE    1 Bottle    Spray 2 sprays in nostril daily 2 sprays in each nostril qd    Seasonal allergic rhinitis due to other  allergic trigger       gabapentin 100 MG capsule    NEURONTIN    90 capsule    Take 1 capsule (100 mg) by mouth every evening as needed    Poliomyelitis       hydrochlorothiazide 25 MG tablet    HYDRODIURIL    90 tablet    Take 1 tablet (25 mg) by mouth daily    Essential hypertension       lisinopril 20 MG tablet    PRINIVIL/ZESTRIL    90 tablet    Take 1 tablet (20 mg) by mouth daily    Essential hypertension       metoclopramide 10 MG tablet    REGLAN     Take 1-2 tabs as needed        multivitamin per tablet     100    ONE DAILY        nitroGLYcerin 0.4 MG sublingual tablet    NITROSTAT    25 tablet    Place 1 tablet (0.4 mg) under the tongue every 5 minutes as needed for chest pain (no more than 3 in one hour; after 3rd, call 911.)    Atypical chest pain       nystatin cream    MYCOSTATIN     Apply topically daily as needed        nystatin-triamcinolone cream    MYCOLOG II     Apply topically daily as needed        ondansetron 4 MG tablet    ZOFRAN     Take 1 tablet by mouth every 6 hours as needed Reported on 3/20/2017        order for DME     1 Units    Equipment being ordered: Oral appliance for sleep apnea    ANNETTE (obstructive sleep apnea)       order for DME     2 each    Equipment being ordered: Compression stockings - Knee High; 20-30 mmHg compression    Insufficiency, arterial, peripheral (H)       order for DME     1 each    Donut Pillow    Coccydynia       pantoprazole 40 MG EC tablet    PROTONIX     Take 40 mg by mouth 2 times daily        SB LOW DOSE ASA EC 81 MG EC tablet   Generic drug:  aspirin      Take 81 mg by mouth daily        sitagliptin 50 MG tablet    JANUVIA     Take 50 mg by mouth daily        SYNTHROID 112 MCG tablet   Generic drug:  levothyroxine      Take 112 mcg by mouth daily Take 112 mcg daily except for Friday takes an additional 56 mcg.  Brand name Synthroid        TOUJEO SOLOSTAR 300 UNIT/ML injection   Generic drug:  insulin glargine U-300      Inject 34 Units Subcutaneous  every morning        triamcinolone 0.1 % cream    KENALOG     Apply topically daily        VITAMIN D-3 PO      Take 2 tablets by mouth

## 2018-08-01 NOTE — PATIENT INSTRUCTIONS
(M79.1) Myalgia  (primary encounter diagnosis)  Comment: We will check labs today including lyme titer and I would recommend stopping Livalo at this time.  Ok to use ice, acetaminophen 1000 mg up to three times daily as needed  Plan: Lyme Disease Isha with reflex to WB Serum, ESR:         Erythrocyte sedimentation rate, CRP,         inflammation, CK total, Comprehensive metabolic        panel, CBC with platelets, Rheumatoid factor            (E66.01) Morbid obesity (H)  Comment: Plan to resume exercise when able following resolution of muscle aches  Plan:     (M25.562) Arthralgia of left knee  Comment: As above - noted deconditioning   Plan: Lyme Disease Isha with reflex to WB Serum, ESR:         Erythrocyte sedimentation rate, CRP,         inflammation, CK total, Comprehensive metabolic        panel, CBC with platelets, Rheumatoid factor            Depression  Comment: feeling overwhelmed at times.  We discussed that there are support groups for dealing with Parkinson's disease.  Also referral to cognitive behavioural therapy was placed today.

## 2018-08-02 LAB
ALBUMIN SERPL-MCNC: 3.7 G/DL (ref 3.4–5)
ALP SERPL-CCNC: 47 U/L (ref 40–150)
ALT SERPL W P-5'-P-CCNC: 25 U/L (ref 0–50)
ANION GAP SERPL CALCULATED.3IONS-SCNC: 8 MMOL/L (ref 3–14)
AST SERPL W P-5'-P-CCNC: 23 U/L (ref 0–45)
B BURGDOR IGG+IGM SER QL: <0.01 (ref 0–0.89)
BILIRUB SERPL-MCNC: 0.5 MG/DL (ref 0.2–1.3)
BUN SERPL-MCNC: 32 MG/DL (ref 7–30)
CALCIUM SERPL-MCNC: 9.3 MG/DL (ref 8.5–10.1)
CHLORIDE SERPL-SCNC: 104 MMOL/L (ref 94–109)
CK SERPL-CCNC: 104 U/L (ref 30–225)
CO2 SERPL-SCNC: 29 MMOL/L (ref 20–32)
CREAT SERPL-MCNC: 1.24 MG/DL (ref 0.52–1.04)
GFR SERPL CREATININE-BSD FRML MDRD: 43 ML/MIN/1.7M2
GLUCOSE SERPL-MCNC: 147 MG/DL (ref 70–99)
POTASSIUM SERPL-SCNC: 4.6 MMOL/L (ref 3.4–5.3)
PROT SERPL-MCNC: 6.9 G/DL (ref 6.8–8.8)
RHEUMATOID FACT SER NEPH-ACNC: <20 IU/ML (ref 0–20)
SODIUM SERPL-SCNC: 141 MMOL/L (ref 133–144)

## 2018-08-02 ASSESSMENT — PATIENT HEALTH QUESTIONNAIRE - PHQ9: SUM OF ALL RESPONSES TO PHQ QUESTIONS 1-9: 3

## 2018-08-03 NOTE — PROGRESS NOTES
Gerard Cochran    I have had the opportunity to review your recent results and an interpretation is as follows:  Your labs all returned within normal limits.  You do not have Lyme disease, it appears you do not have rheumatoid arthritis or any concern for myositis (breakdown of muscle)  I suspect her symptoms might be related to the statin medication we recently started, and I think with limiting this medication your symptoms may improve    We should plan to follow-up in a few weeks to see how you are doing    Sincerely,  Shola Benoit MD

## 2018-08-14 ENCOUNTER — OFFICE VISIT (OUTPATIENT)
Dept: FAMILY MEDICINE | Facility: CLINIC | Age: 73
End: 2018-08-14
Payer: MEDICARE

## 2018-08-14 ENCOUNTER — TELEPHONE (OUTPATIENT)
Dept: PALLIATIVE MEDICINE | Facility: CLINIC | Age: 73
End: 2018-08-14

## 2018-08-14 VITALS
HEART RATE: 65 BPM | WEIGHT: 206 LBS | SYSTOLIC BLOOD PRESSURE: 173 MMHG | TEMPERATURE: 96.9 F | HEIGHT: 62 IN | BODY MASS INDEX: 37.91 KG/M2 | OXYGEN SATURATION: 100 % | DIASTOLIC BLOOD PRESSURE: 72 MMHG

## 2018-08-14 DIAGNOSIS — E66.01 MORBID OBESITY (H): ICD-10-CM

## 2018-08-14 DIAGNOSIS — Z93.3 COLOSTOMY IN PLACE (H): ICD-10-CM

## 2018-08-14 DIAGNOSIS — M79.10 MYALGIA: Primary | ICD-10-CM

## 2018-08-14 DIAGNOSIS — M79.7 FIBROMYALGIA: ICD-10-CM

## 2018-08-14 PROCEDURE — 99214 OFFICE O/P EST MOD 30 MIN: CPT | Performed by: INTERNAL MEDICINE

## 2018-08-14 ASSESSMENT — ANXIETY QUESTIONNAIRES
GAD7 TOTAL SCORE: 2
3. WORRYING TOO MUCH ABOUT DIFFERENT THINGS: NOT AT ALL
IF YOU CHECKED OFF ANY PROBLEMS ON THIS QUESTIONNAIRE, HOW DIFFICULT HAVE THESE PROBLEMS MADE IT FOR YOU TO DO YOUR WORK, TAKE CARE OF THINGS AT HOME, OR GET ALONG WITH OTHER PEOPLE: NOT DIFFICULT AT ALL
2. NOT BEING ABLE TO STOP OR CONTROL WORRYING: NOT AT ALL
7. FEELING AFRAID AS IF SOMETHING AWFUL MIGHT HAPPEN: NOT AT ALL
6. BECOMING EASILY ANNOYED OR IRRITABLE: SEVERAL DAYS
1. FEELING NERVOUS, ANXIOUS, OR ON EDGE: SEVERAL DAYS
5. BEING SO RESTLESS THAT IT IS HARD TO SIT STILL: NOT AT ALL

## 2018-08-14 ASSESSMENT — PATIENT HEALTH QUESTIONNAIRE - PHQ9: 5. POOR APPETITE OR OVEREATING: NOT AT ALL

## 2018-08-14 NOTE — MR AVS SNAPSHOT
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              Follow-ups after your visit        Additional Services     PAIN MANAGEMENT REFERRAL       Your provider has referred you to: FMG: [facility] -    Reason for Referral: Evaluation for comprehensive services- patients will be evaluated if appropriate for comprehensive service including medication changes, procedures, pain psychology, and pain physical therapy.  While involved with comprehensive services, pain providers will work with referring provider/PCP to stabilize appropriate medication management, with long-term plan of transition of prescribing back to referring provider/PCP upon completion of comprehensive services.      Please complete the following questions:    Do you have any specific questions for the pain specialist? No    Are there any red flags that may impact the assessment or management of the patient? Mental Illness / Communication Difficulties (explain): and None      What is your diagnosis for the patient's pain? fibromyalgia      For any questions, contact the [facility] at [phone].     **ANY DIAGNOSTIC TESTS THAT ARE NOT IN EPIC SHOULD BE SENT TO THE PAIN CENTER**    REGARDING OPIOID MEDICATIONS:  The discussion of opioids  management, appropriateness of therapy, and dosing will be discussed in patients being seen for evaluation.  The pain management clinics are not long-term prescribing clinics, with transition of prescribing of medications ultimately going back to the referring provider/PCP.  If prescribing is taken over at the pain clinic, it is in actively involved patients whom are appropriate for opioids, urine drug screening is completed, and long-term prescribing plan has been determined.  Therefore, we will not be automatically taking over prescribing at the patient's first visit.  Is this agreeable to you? agrees.     Please be aware that coverage of these services is subject to the terms and limitations of your health insurance plan.  Call member services at your health plan with any benefit or coverage questions.      Please bring the following with you to your appointment:    (1) Any X-Rays, CTs or MRIs which have been performed.  Contact the facility where they were done to arrange for  prior to your scheduled appointment.    (2) List of current medications   (3) This referral request   (4) Any documents/labs given to you for this referral                  Your next 10 appointments already scheduled     Sep 10, 2018 10:30 AM CDT   (Arrive by 10:15 AM)   RETURN HAIRLOSS with Renetta Meyer MD   [facility] ([facility])    [address] [place] [address]   [phone]            Sep 26, 2018 10:30 AM CDT   (Arrive by 10:00 AM)   New Visit with Bolivar Mcc[facility] ([facility])    [address] [address]   [phone]            Oct 03, 2018 12:30 PM CDT   Return Visit with Bolivar Mccoy [facility] ([facility])    [address] [address]   [phone]              Who to contact     If you have  "questions or need follow up information about today's clinic visit or your schedule please contact [facility] directly at [phone].  Normal or non-critical lab and imaging results will be communicated to you by MyChart, letter or phone within 4 business days after the clinic has received the results. If you do not hear from us within 7 days, please contact the clinic through VideoBursthart or phone. If you have a critical or abnormal lab result, we will notify you by phone as soon as possible.  Submit refill requests through Qiniu or call your pharmacy and they will forward the refill request to us. Please allow 3 business days for your refill to be completed.          Additional Information About Your Visit        [email or web address] Information     Qiniu gives you secure access to your electronic health record. If you see a primary care provider, you can also send messages to your care team and make appointments. If you have questions, please call your primary care clinic.  If you do not have a primary care provider, please call [phone] and they will assist you.        Care EveryWhere ID     This is your Care EveryWhere ID. This could be used by other organizations to access your [place] medical records  [id number]        Your Vitals Were     Pulse Temperature Height Pulse Oximetry Breastfeeding? BMI (Body Mass Index)    65 96.9  F (36.1  C) (Tympanic) 5' 1.5\" (1.562 m) 100% No 38.29 kg/m2       Blood Pressure from Last 3 Encounters:   08/14/18 173/72   08/01/18 134/70   06/27/18 148/70    Weight from Last 3 Encounters:   08/14/18 206 lb (93.4 kg)   08/01/18 202 lb (91.6 kg)   06/27/18 202 lb (91.6 kg)              We Performed the Following     DEPRESSION ACTION PLAN (DAP)     PAIN MANAGEMENT REFERRAL        Primary Care Provider Office Phone # Fax #    Shola Benoit [phone] [address]  [facility] [address]        Equal Access to Services     CORINNE ROBB AH: " Hadii aad ku hadjudyo Soomaali, waaxda luqadaha, qaybta kaalmada consuelo, jose cyndinamrata cooperralph julio. So [facility] [phone].    ATENCIÓN: Si santos miller, tiene a modi disposición servicios gratuitos de asistencia lingüística. Llame al [phone].    We comply with applicable federal civil rights laws and [place] laws. We do not discriminate on the basis of race, color, national origin, age, disability, sex, sexual orientation, or gender identity.            Thank you!     Thank you for choosing [facility]  for your care. Our goal is always to provide you with excellent care. Hearing back from our patients is one way we can continue to improve our services. Please take a few minutes to complete the written survey that you may receive in the mail after your visit with us. Thank you!             Your Updated Medication List - Protect others around you: Learn how to safely use, store and throw away your medicines at www.disposemymeds.org.          This list is accurate as of 8/14/18 12:06 PM.  Always use your most recent med list.                   Brand Name Dispense Instructions for use Diagnosis    ascorbic acid 1000 MG Tabs    vitamin C     Take 1,000 mg by mouth daily        ASTEPRO NA           atenolol 50 MG tablet    TENORMIN    180 tablet    Take 1 tablet (50 mg) by mouth 2 times daily    Essential hypertension       BIOTIN PO      Take 1,000 mcg by mouth        blood glucose monitoring test strip    no brand specified    1 Box    Use to test blood sugar 1 times daily or as directed.    Type 2 diabetes mellitus with other specified complication (H)       clotrimazole 1 % cream    LOTRIMIN     Apply topically daily        cycloSPORINE 0.05 % ophthalmic emulsion    RESTASIS     1 drop 2 times daily        diphenhydrAMINE-acetaminophen  MG tablet    TYLENOL PM     Take 1 tablet by mouth At Bedtime Reported on 3/20/2017        famotidine 40 MG tablet    PEPCID     Take 40 mg by  mouth At Bedtime        fenofibrate 145 MG tablet     90 tablet    Take 1 tablet (145 mg) by mouth daily    CARDIOVASCULAR SCREENING; LDL GOAL LESS THAN 130       ferrous sulfate 325 (65 Fe) MG tablet    IRON     Take 325 mg by mouth daily (with breakfast)        fexofenadine 180 MG tablet    ALLEGRA    120    Take 180 mg by mouth daily.        fluticasone 50 MCG/ACT spray    FLONASE    1 Bottle    Spray 2 sprays in nostril daily 2 sprays in each nostril qd    Seasonal allergic rhinitis due to other allergic trigger       gabapentin 100 MG capsule    NEURONTIN    90 capsule    Take 1 capsule (100 mg) by mouth every evening as needed    Poliomyelitis       hydrochlorothiazide 25 MG tablet    HYDRODIURIL    90 tablet    Take 1 tablet (25 mg) by mouth daily    Essential hypertension       lisinopril 20 MG tablet    PRINIVIL/ZESTRIL    90 tablet    Take 1 tablet (20 mg) by mouth daily    Essential hypertension       metoclopramide 10 MG tablet    REGLAN     Take 1-2 tabs as needed        multivitamin per tablet     100    ONE DAILY        nitroGLYcerin 0.4 MG sublingual tablet    NITROSTAT    25 tablet    Place 1 tablet (0.4 mg) under the tongue every 5 minutes as needed for chest pain (no more than 3 in one hour; after 3rd, call 911.)    Atypical chest pain       nystatin cream    MYCOSTATIN     Apply topically daily as needed        nystatin-triamcinolone cream    MYCOLOG II     Apply topically daily as needed        ondansetron 4 MG tablet    ZOFRAN     Take 1 tablet by mouth every 6 hours as needed Reported on 3/20/2017        order for DME     1 Units    Equipment being ordered: Oral appliance for sleep apnea    ANNETTE (obstructive sleep apnea)       order for DME     2 each    Equipment being ordered: Compression stockings - Knee High; 20-30 mmHg compression    Insufficiency, arterial, peripheral (H)       order for DME     1 each    Donut Pillow    Coccydynia       pantoprazole 40 MG EC tablet    PROTONIX     Take  40 mg by mouth 2 times daily        SB LOW DOSE ASA EC 81 MG EC tablet   Generic drug:  aspirin      Take 81 mg by mouth daily        sitagliptin 50 MG tablet    JANUVIA     Take 50 mg by mouth daily        SYNTHROID 112 MCG tablet   Generic drug:  levothyroxine      Take 112 mcg by mouth daily Take 112 mcg daily except for Friday takes an additional 56 mcg.  Brand name Synthroid        TOUJEO SOLOSTAR 300 UNIT/ML injection   Generic drug:  insulin glargine U-300      Inject 34 Units Subcutaneous every morning        triamcinolone 0.1 % cream    KENALOG     Apply topically daily        VITAMIN D-3 PO      Take 2 tablets by mouth

## 2018-08-14 NOTE — LETTER
August 14, 2018    Chasity Hodgson  [address] [id number]    Dear Chasity,          Welcome to the [facility] at the [facility]. We are located at [address], [address], [place] [address]. Your appointment has been scheduled on Wednesday August 22, 2018 at 8:30a with Stephanie Spence NP.    At your first visit, you will meet your team of caregivers who will help you to develop pain management strategies that will last a lifetime. You will meet with our support staff to review your insurance information and collect your co-payment if required by your insurance company. You will meet with a medical pain specialist and care coordinator who will assess your pain and develop a plan of care for your successful pain rehabilitation. You should expect to spend 1-2 hours at your first visit with us. Usually, patients work with us for a period of 6-12 months, and eventually return to their primary doctor once their pain management has stabilized.      To help us make your visit go as smoothly as possible, please bring the following items with you on your visit:   Completed Pain Questionnaire enclosed in this packet.  If you do not bring the completed questionnaire, we may have to reschedule your appointment.  List of any medicines that you are currently taking or have been prescribed  Pertinent NON-[facility] medical information such as medical records or tests results (X-rays, or laboratory tests)  Your health insurance card  Financial resources to cover your co-payment or balance due at the time of service (cash, personal check, Visa, and MasterCard are acceptable methods of payment)     Due to the high demand for new patient evaluations, you must notify the scheduling department 48 hours in advance if you are not able to keep this appointment.  Failure to do so could affect your ability to reschedule with our clinic. Please do not assume that you  will receive any prescription medications at your first visit.    Please call [phone] with any questions regarding your appointment. We look forward to meeting you and working to address your health care needs.     Sincerely,    [facility] [place]

## 2018-08-14 NOTE — PATIENT INSTRUCTIONS
(M79.1) Myalgia  (primary encounter diagnosis)  Comment: We will refer for pain management consultation   Plan: PAIN MANAGEMENT REFERRAL            (M79.7) Fibromyalgia  Comment: as above   Plan: PAIN MANAGEMENT REFERRAL          Morbid Obesity  Comment: Plan to start pool exercises at [facility]

## 2018-08-14 NOTE — PROGRESS NOTES
"[facility]  CLINIC PROGRESS NOTE    Subjective:  Myalgias   Chasity Hodgson has stopped taking livalo for about 2 weeks now.  She notes that her pain is still a problem.  She does have an appointment to see a psychologist but not until September.   Labs were checked last week and returned within normal limits.  She notes that her pain is mainly in the upper arms bilaterally.  Pain is usually a lower amount, but increases with over use of the arms.  She notes that at work when she is busy using the computers and phones her arms do get fatigued and pain increases.  She dose take acetaminophen which does help.        Past medical history, medications, allergies, social history, family history reviewed and updated in EPIC as of 8/14/2018 .    ROS  CONSTITUTIONAL: no fatigue, no unexpected change in weight  SKIN: no worrisome rashes, no worrisome moles, no worrisome lesions  EYES: no acute vision problems or changes  ENT: no ear problems, no mouth problems, no throat problems  RESP: no significant cough, no shortness of breath  CV: no chest pain, no palpitations, no new or worsening peripheral edema  GI: no nausea, no vomiting, no constipation, no diarrhea  : no frequency, no dysuria, no hematuria  MS: as above   PSYCHIATRIC: no changes in mood or affect      Objective:  Vitals  /72 (BP Location: Left arm, Cuff Size: Adult Large)  Pulse 65  Temp 96.9  F (36.1  C) (Tympanic)  Ht 5' 1.5\" (1.562 m)  Wt 206 lb (93.4 kg)  SpO2 100%  Breastfeeding? No  BMI 38.29 kg/m2  GEN: Alert Oriented x3 NAD  HEENT: Atraumatic, normocephalic, neck supple,  CV: RRR no murmurs or rubs  PULM: CTA no wheezes or crackles  ABD: Obese, soft, nontender nondistended, no hepatosplenomegally  SKIN: No visible skin lesion or ulcerations  MS: full range of motion of the shoulders  EXT: 2+ edema bilateral lower extremities  NEURO: Gait and station deferred, No focal neurologic deficits  PSYCH: Mood anxious/sad, affect mood " congruent    No images are attached to the encounter.    No results found for this or any previous visit (from the past 24 hour(s)).    Assessment/Plan:  Patient Instructions   (M79.1) Myalgia  (primary encounter diagnosis)  Comment: We will refer for pain management consultation   Plan: PAIN MANAGEMENT REFERRAL            (M79.7) Fibromyalgia  Comment: as above   Plan: PAIN MANAGEMENT REFERRAL          Morbid Obesity  Comment: Plan to start pool exercises at [facility]        Follow up in pain clinic    25 minutes spent with patient.  Over 50% of time counseling      Disclaimer: This note consists of symbols derived from keyboarding, dictation and/or voice recognition software. As a result, there may be errors in the script that have gone undetected. Please consider this when interpreting information found in this chart.    Shola Benoit MD  [phone]

## 2018-08-14 NOTE — TELEPHONE ENCOUNTER
Pain Management Center Referral      1. Confirmed address with patient? Yes  2. Confirmed phone number with patient? Yes  3. Confirmed referring provider? Yes  4. Is the PCP the same as the referring provider? Yes  5. Has the patient been to any previous pain clinics? No  (If yes, send JESSICA with welcome letter)  6. Which insurance are we to bill for this appointment?  BCBS/Medicare    7. Informed pt of cancellation (48 hour) policy? Yes    REGARDING OPIOID MEDICATIONS: We will always address appropriateness of opioid pain medications, but we generally will not automatically take on a prescribing role. When we do take on prescribing of opioids for chronic pain, it is in collaboration with the referring physician for an intermediate period of time (months), with an expectation that the primary physician or provider will assume the prescribing role if medications are effective at stable doses with demonstrated compliance. Therefore, please do not assume that your prescribing responsibilities end on the day of pain clinic consultation.  7. Informed pt of prescribing policy? Yes      8. Referring Provider: Shola Benoit     9. Criteria for Triage Eval:   -Missed/Failed 1st DUAL appointment? N/A   -Medication Focused? N/A   -Mental Health Concerns? (e.g. Recent psych hospitalization/snap shot)? N/A   -Active substance abuse? N/A   -Patient behaviors (e.g. Offensive language/raised voice)? N/A      Arthur GOMEZ    [facility]

## 2018-08-15 ASSESSMENT — ANXIETY QUESTIONNAIRES: GAD7 TOTAL SCORE: 2

## 2018-08-21 NOTE — PROGRESS NOTES
"      [facility]     Date of visit: 8/22/2018    Reason for consultation:    Chasity Hodgson is a 72 year old female who is seen in consultation today at the request of her PCP,  Shola Benoit for evaluation of her pain issues and recommendations for management, with specific emphasis on  Reason for Referral: Evaluation for comprehensive services-   Please complete the following questions:    Do you have any specific questions for the pain specialist? No    Are there any red flags that may impact the assessment or management of the patient? Mental Illness / Communication Difficulties (explain): and None      What is your diagnosis for the patient's pain? fibromyalgia     Please see the [facility] health questionnaire which the patient completed and reviewed with me in detail.    Review of [place] Prescription Monitoring Program (): No concern for abuse or misuse of controlled medications based on this report.     Pain medications are not currently prescribed.    Subjective:    Chief Complaint:    Chief Complaint   Patient presents with     Pain       Pain history:  Chasity Hodgson is a 72 year old female who presents for initial evaluation of chief complaint of bilateral arm and left leg pain.      She first started having problems with bilateral arm and left leg pain in April/May of 2018. She had a right shoulder replacement in April of 2018, prior to this had intermittent muscle aching. She developed muscle soreness in her right arm post operatively, states this has progressed since that time. Her left knee pain has been bothersome since end of April. Insidious onset, without acute precipitating event. She has been evaluated for this pain by [facility], and has had multiple \"chicken shots,\" she found this was somewhat helpful. She wears compression stockings with some benefit. She finds ice and Biofreeze helpful. Her left arm pain has been painful " since May, insidious onset, without acute precipitating event. She notes the pain started not long after her rooster cone injection. Describes the pain as aching and muscle spasm. She has been evaluated by his primary care provider for these pains, had blood work completed as part of evaluation, no clear cause identified. She was stopped on Livalo without benefit. Hx of fibromyalgia, states she hasn't had pain similar to this in the past however. Hx of polio at age 3. The pain is located in bilateral upper arms and left thigh and lower leg. Denies numbness or tingling. Weakness related to pain since April.     Pain description:  Location: bilateral arms, left arm  Quality: aching  Severity/Intensity: 4/10 at best, 10/10 at worst, 5/10 on average  Aggravating factors include: using arms, flexing muscles  Relieving factors include: rest, relaxing arms, ice pack on arms/shoulder, ES Tylenol, elevate legs    The patient otherwise denies bowel or bladder incontinence, parasthesias, weakness, saddle anesthesia, unintentional weight loss, or fever/chills/sweats.     Chasity Hodgson has not been seen at a pain clinic in the past. 30-40 years ago [facility].    Pain Treatments:  (H--helped; HI--Helped initially; SWH--Somewhat helpful; NH--No help; W--worse; SE--side effects; ?--Unsure if helpful)   1. Medications:       Current pain medications:   Tylenol 500mg- H after an hour, 2 tabs TID or QID   Biofreeze- H for knee pain after an hour   Salonpas- hasn't started using yet   Gabapentin 100mg at bedtime- ? for sleep or pain    Current calculated MME: 0    1. Previous Pain Relevant Medications:  NOTE: This medication information taken from [facility] intake form, not medical records.    Opiates: oxycodone- H   NSAIDS: cannot take due to abnormal kidney function    Muscle Relaxants: Flexeril- H   Anti-migraine mediations: no   Anti-depressants: amitriptyline- ?    Sleep aids: Ambien- SE, hang over   Anxiolytics:  no   Neuropathics: gabapentin- ?    Topicals: Biofreeze- H for knee pain   Other medications not covered above: Tylenol- H     2. Physical Therapy: completed physical therapy for right shoulder  3. Pain Psychology: no  4. Surgery: right shoulder replacement 4/4/18 Adam Ferrera  5. Injections: left knee steroid injections and rooster cone [facility] last in 8/13/18  6. Chiropractic: no  7. Acupuncture: no  8. TENS Unit: no    Labs:  CRP was completed on 8/1/18 and shows:  CRP Inflammation 7.2  0.0 - 8.0 mg/L Final 08/01/2018 11:52 AM 51     Sed rate was completed on 8/1/18 and shows:  Sed Rate 15  0 - 30 mm/h Final 08/01/2018 11:52 AM 54     Lyme antibody was completed on 8/1/18 and shows:  Lyme Disease Antibodies Serum <0.01  0.00 - 0.89  Final 08/01/2018 11:52 AM 51     CK was completed on 8/1/18 and shows:  CK Total 104  30 - 225 U/L Final 08/01/2018 11:52 AM 65       Past Medical History:  Past Medical History:   Diagnosis Date     Abdominal adhesions 1984, 96,99    s/p lysis     Allergic rhinitis, cause unspecified      Carotid stenosis      CPAP (continuous positive airway pressure) dependence      Diabetic gastroparesis (H)      Diet-controlled type 2 diabetes mellitus (H)      DVT of axillary vein, acute right (H)      Fibromyalgia      Gastro-oesophageal reflux disease      Hernia of unspecified site of abdominal cavity without mention of obstruction or gangrene     bilateral     HTN (hypertension)      Hypertriglyceridemia      Obstructive sleep apnea      ANNETTE (obstructive sleep apnea)      Papillary carcinoma of thyroid (H)     s/p thyroidectomy - Ruegemer     PE (pulmonary embolism)      Poliomyelitis     poor circulation right leg     Postsurgical hypothyroidism     s/p papillary thryoid cancer - Ruegemer     Pulmonary embolism (H)      Rosacea      S/P carpal tunnel release     bilateral     S/P hardware removal 01/2014    still with lingering foot pain     S/P shoulder surgery      bilateral     Septic joint (H)     right knee     Venous insufficiency      Venous thrombosis 1999    right axillary vein       Past Surgical History:  Past Surgical History:   Procedure Laterality Date     AMPUTATE TOE(S)  3/15/2012    Procedure:AMPUTATE TOE(S); Surgeon:SUKHDEEP METZ; Location: OR     APPENDECTOMY  1972     ARTHRODESIS FOOT  3/15/2012    Procedure:ARTHRODESIS FOOT; RIGHT TRIPLE ARTHRODESIS, FIFTH TOE AMPUTATION, LATERAL LIGAMENT RECONSTRUCTION, TENDON TRANSFER AND RELEASE [MINI C-ARM, ARTHREX 4.5 AND 6.7 STAPLES, BIOCOMPOSITE TENODESIS SCREWS]; Surgeon:SUKHDEEP METZ; Location: OR     C FREEING BOWEL ADHESION,ENTEROLYSIS      1986, 1996, 1999     C NONSPECIFIC PROCEDURE      throidectomy     C TOTAL ABDOM HYSTERECTOMY  1980    + BSO     CHOLECYSTECTOMY       COLOSTOMY  2/7/2012    Procedure:COLOSTOMY; CREATION OF SIGMOID COLOSTOMY AND EXTENSIVE  LYSIS OF ADHESIONS; Surgeon:MONTSERRAT BENDER; Location: OR     GI SURGERY      weakened rectal sphincter with artificial stimulator     LAPAROTOMY, LYSIS ADHESIONS, COMBINED  2/7/2012    Procedure:COMBINED LAPAROTOMY, LYSIS ADHESIONS; Surgeon:MONTSERRAT BENDER; Location: OR     RELEASE TENDON FOOT  3/15/2012    Procedure:RELEASE TENDON FOOT; Surgeon:SUKHDEEP METZ; Location: OR     REMOVE HARDWARE FOOT  12/13/2012    Procedure: REMOVE HARDWARE FOOT;  RIGHT FOOT REMOVAL OF HARDWARE;  Surgeon: Sukhdeep Metz MD;  Location:  OR       Medications:  Current Outpatient Prescriptions   Medication Sig Dispense Refill     ascorbic acid (VITAMIN C) 1000 MG TABS Take 1,000 mg by mouth daily       aspirin (SB LOW DOSE ASA EC) 81 MG EC tablet Take 81 mg by mouth daily       atenolol (TENORMIN) 50 MG tablet Take 1 tablet (50 mg) by mouth 2 times daily 180 tablet 3     Azelastine HCl (ASTEPRO NA)        BIOTIN PO Take 1,000 mcg by mouth       blood glucose monitoring (NO BRAND SPECIFIED) test strip Use to test blood  sugar 1 times daily or as directed. 1 Box 6     Cholecalciferol (VITAMIN D-3 PO) Take 2 tablets by mouth       clotrimazole (LOTRIMIN) 1 % cream Apply topically daily       cycloSPORINE (RESTASIS) 0.05 % ophthalmic emulsion 1 drop 2 times daily       diphenhydrAMINE-acetaminophen (TYLENOL PM)  MG tablet Take 1 tablet by mouth At Bedtime Reported on 3/20/2017       famotidine (PEPCID) 40 MG tablet Take 40 mg by mouth At Bedtime       fenofibrate 145 MG tablet Take 1 tablet (145 mg) by mouth daily 90 tablet 1     ferrous sulfate (IRON) 325 (65 FE) MG tablet Take 325 mg by mouth daily (with breakfast)       fexofenadine (ALLEGRA) 180 MG tablet Take 180 mg by mouth daily. 120 0     fluticasone (FLONASE) 50 MCG/ACT spray Spray 2 sprays in nostril daily 2 sprays in each nostril qd 1 Bottle 0     gabapentin (NEURONTIN) 100 MG capsule Take 1 capsule (100 mg) by mouth every evening as needed 90 capsule 3     hydrochlorothiazide (HYDRODIURIL) 25 MG tablet Take 1 tablet (25 mg) by mouth daily 90 tablet 3     insulin glargine U-300 (TOUJEO SOLOSTAR) 300 UNIT/ML injection Inject 34 Units Subcutaneous every morning        levothyroxine (SYNTHROID) 112 MCG tablet Take 112 mcg by mouth daily Take 112 mcg daily except for Friday takes an additional 56 mcg.  Brand name Synthroid       lisinopril (PRINIVIL/ZESTRIL) 20 MG tablet Take 1 tablet (20 mg) by mouth daily 90 tablet 2     methocarbamol (ROBAXIN) 500 MG tablet Take 1-2 tablets (500-1,000 mg) by mouth 3 times daily as needed for muscle spasms 120 tablet 0     metoclopramide (REGLAN) 10 MG tablet Take 1-2 tabs as needed       MULTIVITAMINS OR TABS ONE DAILY 100 3     nitroGLYcerin (NITROSTAT) 0.4 MG sublingual tablet Place 1 tablet (0.4 mg) under the tongue every 5 minutes as needed for chest pain (no more than 3 in one hour; after 3rd, call 911.) 25 tablet 3     nystatin (MYCOSTATIN) cream Apply topically daily as needed        nystatin-triamcinolone (MYCOLOG II) cream  Apply topically daily as needed       ondansetron (ZOFRAN) 4 MG tablet Take 1 tablet by mouth every 6 hours as needed Reported on 3/20/2017       order for DME Equipment being ordered: Compression stockings - Knee High; 20-30 mmHg compression 2 each 0     order for DME Donut Pillow 1 each 0     order for DME Equipment being ordered: Oral appliance for sleep apnea 1 Units 0     pantoprazole (PROTONIX) 40 MG EC tablet Take 40 mg by mouth 2 times daily       sitagliptin (JANUVIA) 50 MG tablet Take 50 mg by mouth daily       triamcinolone (KENALOG) 0.1 % cream Apply topically daily         Allergies:     Allergies   Allergen Reactions     Nsaids Difficulty breathing     Increased creatinine     Toradol Difficulty breathing     Shortness of breath     Celecoxib Itching and Rash     Codeine Itching     With higher doses     Crestor [Rosuvastatin] Muscle Pain (Myalgia)     No Clinical Screening - See Comments Itching     Fragrance     Vioxx Other (See Comments)     Heart races     Conjugated Estrogens Rash     Sulfa Drugs Rash       Social History:  Home situation: lives in a house  Support system:  (has Parkinsons disease- she is caregiver), son  Occupation/Schooling:    Tobacco use: no  Drug use: no  Alcohol use: no  History of chemical dependency treatment: no  Mental health admissions: no    Family history:  Family History   Problem Relation Age of Onset     Arthritis Mother      Hypertension Mother      Cerebrovascular Disease Mother      Obesity Mother      HEART DISEASE Mother      MI's     Hypertension Father      Respiratory Father      Adult RDS     Diabetes Father      adult     Arthritis Sister      Cancer Sister      Arthritis Sister      Hypertension Sister      Cancer Sister      colon polup     HEART DISEASE Brother      MI at 54     Hypertension Sister      Lipids Brother      Hypertension Brother      Lipids Sister      Obesity Sister      Obesity Maternal Grandmother       Skin Cancer Maternal Grandmother      skin cancer unknown     Cancer Maternal Grandmother      unknown skin cancer on face     Family history of headaches: no    Review of Systems:    POSTIVE IN BOLD  GENERAL: fever/chills, fatigue, general unwell feeling, weight gain/loss.  HEAD/EYES:  headache, dizziness, or vision changes.    EARS/NOSE/THROAT: nosebleeds, hearing loss, sinus infection, earache, tinnitus.  IMMUNE:  allergies, cancer, immune deficiency, or infections.  SKIN:  itching, rash, hives  HEME/Lymphatic: anemia, easy bruising, easy bleeding.  RESPIRATORY: cough, wheezing, or shortness of breath  CARDIOVASCULAR/Circulation: extremity edema, syncope, hypertension, tachycardia, or angina.  GASTROINTESTINAL: abdominal pain, nausea/emesis, diarrhea, constipation,  hematochezia, or melena.  ENDOCRINE:  diabetes, steroid use,  thyroid disease or osteoporosis.  MUSCULOSKELETAL: joint pain, stiffness, neck pain, back pain, arthritis, or gout.  GENITOURINARY: frequency, urgency, dysuria, difficulty voiding, hematuria or incontinence (urge only).  NEUROLOGIC: weakness, numbness, paresthesias, seizure, tremor, stroke or memory loss.  PSYCHIATRIC: depression, anxiety, stress, suicidal thoughts or mood swings.     Objective:    Physical Exam:  Vitals:    08/22/18 0832 08/22/18 0836   BP: 152/72 152/72   Pulse: 61    SpO2: 97%    Weight: 93.4 kg (206 lb)      Exam:  Constitutional: Well developed, well nourished, appears stated age. Obese.   HEENT: Head atraumatic, normocephalic. Eyes without conjunctival injection or jaundice. Oropharynx clear. Neck supple. No obvious neck masses.  Cardiovascular: Regular rate/rhythm; no murmurs/rubs/gallops appreciated.  Respiratory: Lungs clear to auscultation bilaterally. Good aeration. No wheezing/rales/rhonchi.   Skin: No rash, lesions, or petechiae of exposed skin.   Extremities: Peripheral pulses intact. No clubbing, cyanosis, or edema. Tenderness to bilateral upper arms and  left thigh.   Psychiatric/mental status: Alert, without lethargy or stupor. Speech fluent. Appropriate affect. Mood normal. Able to follow commands without difficulty.     Musculoskeletal exam:  Able to walk on the heels and toes with significant difficulty. Patient has antalgic gait favoring the left side.   Normal bulk and tone. Unremarkable spinal curvature.     Cervical spine:  Range of motion within normal limits.  Tenderness in the cervical paraspinal muscles.No    Thoracic spine:    Kyphosis. Yes- slight   Tenderness in the thoracic paraspinal muscles.No    Lumbar spine:    Flex:  80 degrees   Ext: 10 degrees   Tenderness in the lumbar paraspinal muscles.No    Myofascial tenderness: Bilateral upper arms and left thigh.   Focal tenderness: No SI joint, gluteal, piriformis, or GT tenderness    Neurologic exam:  CN:  Cranial nerves 2-12 are grossly intact  Motor:  5/5 UE and LE strength  Strength:       C4 (shoulder shrug)  symmetric 5/5       C5 (shoulder abduction) symmetric 5/5       C6 (elbow flexion) symmetric 5/5       C7 (elbow extension) symmetric 5/5       C8 (finger abduction, thumb flexion) symmetric 5/5    Reflexes:     Biceps:     R:  2/4 L: 2/4   Brachioradialis   R:  2/4 L: 2/4   Patella:  R:  1/4 L: 1/4   Achilles:  R:  1/4 L: 1/4  Other reflexes:    No ankle clonus     Sensory:   Light touch: normal bilateral upper and lower extremities    Vibration: normal in LE   No allodynia, dysesthesia, or hyperalgesia.    DIRE Score for ongoing opioid management is calculated as follows:   Diagnosis = 2 pts (slowly progressive; moderate pain/objective findings)   Intractability = 2 pts (most treatments tried; patient not fully engaged/barriers)   Risk    Psych = 3 pts (no significant personality dysfunction/mental illness; good communication with clinic)    Chem Hlth = 3 pts (no history of chemical dependency; not drug-focused)   Reliability = 3 pts (highly reliable with meds, appointments,  treatments)   Social = 3 pts (supportive family/close relationships; involved in work/school; no isolation)   (Psych + Chem hlth + Reliability + Social) = 16     Efficacy = 2 pts (moderate benefit/function; low med dose; too early/not tried meds)         DIRE Score = 18        7-13: likely NOT suitable candidate for long-term opioid analgesia       14-21: may be a suitable candidate for long-term opioid analgesia     Assessment:  Chasity Hodgson is a 72 year old female with a past medical history significant for poliomyelitis, allergic rhinitis, ANNETTE, gastroparesis, esophageal reflux, gastroparesis, morbid obesity, hyperlipidemia, hypothyroidism, type 2 diabetes mellitus, anemia, DVT, and fibromyalgia who presents with complaints of bilateral arm and left leg pain.     1. Bilateral arm pain and left leg pain- etiology unclear, has had evaluation completed by primary care provider, may be related to fibromyalgia.  2. Mental Health - the patient's mental health concerns affect her experience of pain and contribute to her clinically significant distress.    1. Myalgia    2. Fibromyalgia        Plan:  The following recommendations were given to the patient. Diagnosis, treatment options, risks, benefits, and alternatives were discussed, and all questions were answered. The patient expressed understanding of the plan for management.     I am recommending a multidisciplinary treatment plan to help this patient better manage her pain.  This includes:      1.  Pain Physical Therapy:  YES   Schedule first visit with JOEY Kaba. Try to have 2-3 sessions prior to next visit.   2.  Pain Psychologist to address relaxation, behavioral change, coping style, and other factors important to improvement.  YES   We will focus on pain PT for now. Would recommend in the future.    3.  Medication Management:     1. Sammie describes muscle spasm pain, reasonable to try a muscle relaxant. We will try Robaxin for muscle spasm, take 1-2 tablets up  to three times daily as needed.  Continue Biofreeze and gabapentin for now. As Sammie often takes 4000mg of Tylenol in 24 hours, recommended she try taking no more than 3000mg when able.    4.  Advised she consider chiropractic care or acupuncture, can discuss at next visit.    5.  Patient to follow up with Primary Care provider regarding elevated blood pressure. She has not taken her BP medication yet this morning.    6.  Follow up with MAGALY Dan CNP in 4 weeks.     Review of Electronic Chart: Today I have also reviewed available medical information in the patient's medical record at [place] ([facility]), including relevant provider notes, laboratory work, and imaging.       I spent 60 minutes of time face to face with the patient.  Greater than 50% of this time was spent in patient counseling and/or coordination of care regarding principles of multidisciplinary care, medication management, and treatment options as discussed above.      MAGALY Dan CNP  [facility]

## 2018-08-22 ENCOUNTER — OFFICE VISIT (OUTPATIENT)
Dept: PALLIATIVE MEDICINE | Facility: CLINIC | Age: 73
End: 2018-08-22
Attending: INTERNAL MEDICINE
Payer: MEDICARE

## 2018-08-22 VITALS
DIASTOLIC BLOOD PRESSURE: 72 MMHG | HEART RATE: 61 BPM | SYSTOLIC BLOOD PRESSURE: 152 MMHG | OXYGEN SATURATION: 97 % | BODY MASS INDEX: 38.29 KG/M2 | WEIGHT: 206 LBS

## 2018-08-22 DIAGNOSIS — M79.10 MYALGIA: Primary | ICD-10-CM

## 2018-08-22 DIAGNOSIS — M79.7 FIBROMYALGIA: ICD-10-CM

## 2018-08-22 PROCEDURE — 99205 OFFICE O/P NEW HI 60 MIN: CPT | Performed by: NURSE PRACTITIONER

## 2018-08-22 RX ORDER — METHOCARBAMOL 500 MG/1
500-1000 TABLET, FILM COATED ORAL 3 TIMES DAILY PRN
Qty: 120 TABLET | Refills: 0 | Status: SHIPPED | OUTPATIENT
Start: 2018-08-22 | End: 2018-09-20 | Stop reason: SINTOL

## 2018-08-22 ASSESSMENT — PAIN SCALES - GENERAL: PAINLEVEL: MODERATE PAIN (5)

## 2018-08-22 NOTE — PATIENT INSTRUCTIONS
1.  Pain Physical Therapy:  YES   Schedule first visit with JOEY Kaba. Try to have 2-3 sessions prior to next visit.   2.  Pain Psychologist to address relaxation, behavioral change, coping style, and other factors important to improvement.  YES   Hold off for now. Would recommend in the future.    3.  Medication Management:     1. Lets try Robaxin for muscle spasm, monitor if this is effective. Take 1-2 tablets up to three times daily as needed.  Continue Biofreeze and gabapentin for now. Try to take no more than 3000mg of Tylenol in 24 hours when able.    4.  Consider chiropractic care or acupuncture, can discuss at next visit.    5.  Follow up with MAGALY Dan CNP in 4 weeks.       ----------------------------------------------------------------  Clinic Number:  [phone]   Call this number with any questions about your care and for scheduling assistance. Calls are returned Monday through Friday between 8 AM and 4:30 PM. We usually get back to you within 2 business days depending on the issue/request.       Medication refills:    For non-narcotic medications, call your pharmacy directly to request a refill. The pharmacy will contact the [facility] for authorization. Please allow 3-4 days for these refills to be processed.     For narcotic refills, call the nurse triage line or send a [employer] message. Please contact us 7-10 days before your refill is due. The message MUST include the name of the specific medication(s) requested and how you would like to receive the prescription(s). The options are as follows:    Pain Clinic staff can mail the prescription to your pharmacy. Please tell us the name of the pharmacy.    You may pick the prescription up at the Pain Clinic (tell us the location) or during a clinic visit with your pain provider    Pain Clinic staff can deliver the prescription to the [facility] in the clinic building. Please tell us the location.      We believe regular  attendance is key to your success in our program.    Any time you are unable to keep your appointment we ask that you call us at least 24 hours in advance to let us know. This will allow us to offer the appointment time to another patient.

## 2018-08-22 NOTE — MR AVS SNAPSHOT
After Visit Summary   8/22/2018    Chasity Hodgson    MRN: [id number]           Patient Information     Date Of Birth          1945        Visit Information        Provider Department      8/22/2018 8:30 AM Abbi Spence APRN CNP [facility]        Today's Diagnoses     Myalgia    -  1    Fibromyalgia          Care Instructions     1.  Pain Physical Therapy:  YES   Schedule first visit with JOEY Kaba. Try to have 2-3 sessions prior to next visit.   2.  Pain Psychologist to address relaxation, behavioral change, coping style, and other factors important to improvement.  YES   Hold off for now. Would recommend in the future.    3.  Medication Management:     1. Lets try Robaxin for muscle spasm, monitor if this is effective. Take 1-2 tablets up to three times daily as needed.  Continue Biofreeze and gabapentin for now. Try to take no more than 3000mg of Tylenol in 24 hours when able.    4.  Consider chiropractic care or acupuncture, can discuss at next visit.    5.  Follow up with MAGALY Dna CNP in 4 weeks.       ----------------------------------------------------------------  Clinic Number:  [phone]   Call this number with any questions about your care and for scheduling assistance. Calls are returned Monday through Friday between 8 AM and 4:30 PM. We usually get back to you within 2 business days depending on the issue/request.       Medication refills:    For non-narcotic medications, call your pharmacy directly to request a refill. The pharmacy will contact the [facility] Management Center for authorization. Please allow 3-4 days for these refills to be processed.     For narcotic refills, call the nurse triage line or send a [facility] [facility] message. Please contact us 7-10 days before your refill is due. The message MUST include the name of the specific medication(s) requested and how you would like to receive the prescription(s). The options are as follows:    Pain  Clinic staff can mail the prescription to your pharmacy. Please tell us the name of the pharmacy.    You may pick the prescription up at the Pain Clinic (tell us the location) or during a clinic visit with your pain provider    Pain Clinic staff can deliver the prescription to the [place] pharmacy in the clinic building. Please tell us the location.      We believe regular attendance is key to your success in our program.    Any time you are unable to keep your appointment we ask that you call us at least 24 hours in advance to let us know. This will allow us to offer the appointment time to another patient.               Follow-ups after your visit        Additional Services     PAIN PT EVAL AND TREAT                 Your next 10 appointments already scheduled     Sep 10, 2018 10:30 AM CDT   (Arrive by 10:15 AM)   RETURN HAIRLOSS with Renetta Meyer MD   [facility] ([facility])    [address] [place] [address] [id number] [phone]            Sep 19, 2018 10:00 AM CDT   (Arrive by 9:30 AM)   New Visit with CELIA Rodrigues   [facility] ([facility])    [address] [facility] [address] [facility] [address] [phone]            Sep 26, 2018  9:00 AM CDT   Return Visit with CELIA Rodrigues   [facility]    [address] [address] [facility] [address] [phone]              Who to contact     If you have questions or need follow up information about today's clinic visit or your schedule please contact [facility] directly at [phone].  Normal or non-critical lab and imaging results will be communicated to you by MyChart, letter or phone within 4 business days after the clinic has received the results. If you do not hear from us within 7 days, please contact the clinic through MyChart or phone. If you have a critical or abnormal lab result, we will notify  you by phone as soon as possible.  Submit refill requests through [employer] or call your pharmacy and they will forward the refill request to us. Please allow 3 business days for your refill to be completed.          Additional Information About Your Visit        [employer] Information     [employer] gives you secure access to your electronic health record. If you see a primary care provider, you can also send messages to your care team and make appointments. If you have questions, please call your primary care clinic.  If you do not have a primary care provider, please call [phone] and they will assist you.        Care EveryWhere ID     This is your Care EveryWhere ID. This could be used by other organizations to access your [facility] medical records  [id number]        Your Vitals Were     Pulse Pulse Oximetry BMI (Body Mass Index)             61 97% 38.29 kg/m2          Blood Pressure from Last 3 Encounters:   08/22/18 152/72   08/14/18 173/72   08/01/18 134/70    Weight from Last 3 Encounters:   08/22/18 93.4 kg (206 lb)   08/14/18 93.4 kg (206 lb)   08/01/18 91.6 kg (202 lb)              We Performed the Following     PAIN PT EVAL AND TREAT          Today's Medication Changes          These changes are accurate as of 8/22/18  9:17 AM.  If you have any questions, ask your nurse or doctor.               Start taking these medicines.        Dose/Directions    methocarbamol 500 MG tablet   Commonly known as:  ROBAXIN   Used for:  Myalgia   Started by:  Abbi Spence APRN CNP        Dose:  500-1000 mg   Take 1-2 tablets (500-1,000 mg) by mouth 3 times daily as needed for muscle spasms   Quantity:  120 tablet   Refills:  0            Where to get your medicines      These medications were sent to [facility] [address]  [address] [facility] [address]     Phone:  [phone]     methocarbamol 500 MG tablet                Primary Care Provider Office Phone # Fax  #    Shola Benoit [phone]       [address] [facility] [address]  [facility] [address]        Equal Access to Services     CORINNE ROBB : Hadanh domi acosta yoan Olea, waninfada luqdasha, qaluketa kasabrinada consuelo, jose mann latreverralph kim. So [facility] [phone].    ATENCIÓN: Si habla español, tiene a modi disposición servicios gratuitos de asistencia lingüística. Llame al [phone].    We comply with applicable federal civil rights laws and [place] laws. We do not discriminate on the basis of race, color, national origin, age, disability, sex, sexual orientation, or gender identity.            Thank you!     Thank you for choosing [facility]  for your care. Our goal is always to provide you with excellent care. Hearing back from our patients is one way we can continue to improve our services. Please take a few minutes to complete the written survey that you may receive in the mail after your visit with us. Thank you!             Your Updated Medication List - Protect others around you: Learn how to safely use, store and throw away your medicines at www.disposemymeds.org.          This list is accurate as of 8/22/18  9:17 AM.  Always use your most recent med list.                   Brand Name Dispense Instructions for use Diagnosis    ascorbic acid 1000 MG Tabs    vitamin C     Take 1,000 mg by mouth daily        ASTEPRO NA           atenolol 50 MG tablet    TENORMIN    180 tablet    Take 1 tablet (50 mg) by mouth 2 times daily    Essential hypertension       BIOTIN PO      Take 1,000 mcg by mouth        blood glucose monitoring test strip    no brand specified    1 Box    Use to test blood sugar 1 times daily or as directed.    Type 2 diabetes mellitus with other specified complication (H)       clotrimazole 1 % cream    LOTRIMIN     Apply topically daily        cycloSPORINE 0.05 % ophthalmic emulsion    RESTASIS     1 drop 2 times daily         diphenhydrAMINE-acetaminophen  MG tablet    TYLENOL PM     Take 1 tablet by mouth At Bedtime Reported on 3/20/2017        famotidine 40 MG tablet    PEPCID     Take 40 mg by mouth At Bedtime        fenofibrate 145 MG tablet     90 tablet    Take 1 tablet (145 mg) by mouth daily    CARDIOVASCULAR SCREENING; LDL GOAL LESS THAN 130       ferrous sulfate 325 (65 Fe) MG tablet    IRON     Take 325 mg by mouth daily (with breakfast)        fexofenadine 180 MG tablet    ALLEGRA    120    Take 180 mg by mouth daily.        fluticasone 50 MCG/ACT spray    FLONASE    1 Bottle    Spray 2 sprays in nostril daily 2 sprays in each nostril qd    Seasonal allergic rhinitis due to other allergic trigger       gabapentin 100 MG capsule    NEURONTIN    90 capsule    Take 1 capsule (100 mg) by mouth every evening as needed    Poliomyelitis       hydrochlorothiazide 25 MG tablet    HYDRODIURIL    90 tablet    Take 1 tablet (25 mg) by mouth daily    Essential hypertension       lisinopril 20 MG tablet    PRINIVIL/ZESTRIL    90 tablet    Take 1 tablet (20 mg) by mouth daily    Essential hypertension       methocarbamol 500 MG tablet    ROBAXIN    120 tablet    Take 1-2 tablets (500-1,000 mg) by mouth 3 times daily as needed for muscle spasms    Myalgia       metoclopramide 10 MG tablet    REGLAN     Take 1-2 tabs as needed        multivitamin per tablet     100    ONE DAILY        nitroGLYcerin 0.4 MG sublingual tablet    NITROSTAT    25 tablet    Place 1 tablet (0.4 mg) under the tongue every 5 minutes as needed for chest pain (no more than 3 in one hour; after 3rd, call 911.)    Atypical chest pain       nystatin cream    MYCOSTATIN     Apply topically daily as needed        nystatin-triamcinolone cream    MYCOLOG II     Apply topically daily as needed        ondansetron 4 MG tablet    ZOFRAN     Take 1 tablet by mouth every 6 hours as needed Reported on 3/20/2017        order for DME     1 Units    Equipment being ordered: Oral  appliance for sleep apnea    ANNETTE (obstructive sleep apnea)       order for DME     2 each    Equipment being ordered: Compression stockings - Knee High; 20-30 mmHg compression    Insufficiency, arterial, peripheral (H)       order for DME     1 each    Donut Pillow    Coccydynia       pantoprazole 40 MG EC tablet    PROTONIX     Take 40 mg by mouth 2 times daily        SB LOW DOSE ASA EC 81 MG EC tablet   Generic drug:  aspirin      Take 81 mg by mouth daily        sitagliptin 50 MG tablet    JANUVIA     Take 50 mg by mouth daily        SYNTHROID 112 MCG tablet   Generic drug:  levothyroxine      Take 112 mcg by mouth daily Take 112 mcg daily except for Friday takes an additional 56 mcg.  Brand name Synthroid        TOUJEO SOLOSTAR 300 UNIT/ML injection   Generic drug:  insulin glargine U-300      Inject 34 Units Subcutaneous every morning        triamcinolone 0.1 % cream    KENALOG     Apply topically daily        VITAMIN D-3 PO      Take 2 tablets by mouth

## 2018-08-24 ENCOUNTER — OFFICE VISIT (OUTPATIENT)
Dept: PALLIATIVE MEDICINE | Facility: CLINIC | Age: 73
End: 2018-08-24
Payer: MEDICARE

## 2018-08-24 DIAGNOSIS — M79.10 MYALGIA: Primary | ICD-10-CM

## 2018-08-24 DIAGNOSIS — M79.7 FIBROMYALGIA: ICD-10-CM

## 2018-08-24 PROCEDURE — 97530 THERAPEUTIC ACTIVITIES: CPT | Mod: GP | Performed by: PHYSICAL THERAPIST

## 2018-08-24 PROCEDURE — G8978 MOBILITY CURRENT STATUS: HCPCS | Mod: CJ | Performed by: PHYSICAL THERAPIST

## 2018-08-24 PROCEDURE — G8979 MOBILITY GOAL STATUS: HCPCS | Mod: CI | Performed by: PHYSICAL THERAPIST

## 2018-08-24 PROCEDURE — 97162 PT EVAL MOD COMPLEX 30 MIN: CPT | Mod: GP | Performed by: PHYSICAL THERAPIST

## 2018-08-24 NOTE — PROGRESS NOTES
PHYSICAL THERAPY MEDICARE INITIAL EVALUATION and PLAN OF CARE    Patient Name: Chasity Hodgson     : 1945    MRN: [id number]   Pain Management Provider:  MAGALY Clancy CNP  Diagnosis:    Myalgia  Fibromyalgia    SUBJECTIVE:    PRESENTATION AND ETIOLOGY    Chief Complaint: Pt presents with primary complaints of bilateral arm and left leg pain. She has been told left leg pain is related to radiating pain secondary to osteoarthritis of her left knee. She feels right shoulder pain is related to right shoulder replacement in 2018.  She is currently attending PT in [place] for shoulder rehabilitation.  She is wondering if left shoulder pain is due to history of old work injury and progressive arthritis.  Pt states she has history of Fibromyalgia in the past, but mostly noted symptoms of fatigue.  Since her shoulder surgery in 2018 she noted increased fatigue and muscle aches in both shoulders.  She also reported feeling sick with nausea and diarrhea most likely due to cholesterol medication at the time.  She is feeling better since discontinuing that medication.     Pattern Since Onset: Improved    Pain is described as aching      Frequency: Constant      Intensity: Best 4/10, Worst 10/10;  Current 5/10 ; Average 5/10    Fatigue Level: (scale 0-10)  7/10 average    LEVEL OF FUNCTION AT START OF CARE    Walking tolerance: 30 minutes  Sitting tolerance: not limited  Standing tolerance: 30 minutes  Housework tolerance: 30 minutes  Sleep: not limited  Current Aggrevating Activities / Functional Limitations: using arms, flexing muscles      CURRENT / PREVIOUS INTERVENTION(S):     Relieving Activities / Self Care: rest, relaxing arms, ice pack on arms/shoulder, ES Tylenol, elevate legs    Previous / Current therapies for current chief complaint: medication, PT for right shoulder, Right TSA, injections left knee       DEMOGRAPHICS  Employment Status: works as   (switchboard) at [employer]    Social Support: lives in a house with  ( she is caregiver to her  who has Parkinson's    Patient's perceived quality of life:  Feeling overwhelmed    Pertinent Medical  / Surgical History: Epic Snapshot Reviewed, See provider's note.    Patient's goals for physical therapy: learn how to manage pain and how to avoid pain flare up    ===============================================================  OBJECTIVE:  POSTURE:  Observation: Patient demonstrates forward head, protracted shoulders and thoracic kyphosis in standing and sitting posture.  Pt appears pleasant and talkative. Noted several episodes of tearfulness/emotional response due to feeling stressed and overwhelmed with work, home and mostly worrying about her  with Parkinson's    GAIT, LOCOMOTION, and BALANCE:  Gait and Locomotion: slow and antalgic on right due to polio as a child; uses SEC  Balance: impaired due to polio    RANGE OF MOTION: next    MUSCLE PERFORMANCE:   Strength: Demonstrates core instability    FUNCTIONAL TESTING/OBSERVATION: independent chair mobility    Pain behaviors: None    ===============================================================  Today's Treatment:  Initial evaluation  Therapeutic Activity:   For 30 minutes including instruction in breathing/relaxation (issued CD), discussed beginning mini breaks and pacing; reinforced continued 4WW walking at mall x 30' 2-3 days/week.   Patient was educated about nerve sensitivity caused by central sensitization and driven by both biological and psychosocial factors.  The patient was encouraged to identify personal stressors which contribute to pain and to discuss these with PT for guidance and plan to move ahead.  We discussed that new muscle aches may be symptomatic of FM flare.  Patient was educated about the function of the sympathetic nervous system and how it is impacted by persistent input/pain, as well as the importance of  self-care techniques useful in calming the sympathetic nervous system.  Focus next session:  Recommended patient return to Pain PT when completed with PT for shoulder rehab.  Focus on continued current pacing/cardio/HEP and meet with psychologist in September to address current overwhelming stressors.  Resume Pain PT for further guidance in self cares/pain management strategies when able.  Pt is also interested in aquatic therapy and this may be beneficial in the future.    ===============================================================  ASSESSMENT:  Physical Therapy Diagnosis:Impaired Posture and Impaired Muscle Performance    Patient requires PT intervention for the following impairments: Limited knowledge of condition and / or self care - inability to control symptoms, Impaired functional mobility, Impaired gait / weight bearing tolerance, Impaired posture / muscle imbalance, Pain and Deconditioning    Anticipated Goals and Expected Outcomes:   8 weeks  Patient will report the use of 2 self care practices during their day.  Patient will report the participation in 30 minutes of aerobic activity daily and practicing stretching daily.  Patient will demonstrate the ability to find core strength in neutral posture.  Patient will demonstrate the ability to relax muscle group before stretching.    16 weeks  Patient will be independent with a home exercise program.  Patient will be independent with posture correction.  Patient will report independence with a self care/flare management program.  Patient will demonstrate improved functional strength and endurance as reports by increased tolerance for IADLs and more consistent participation in daily activity.       Rehab potential for achieving goals: good.    ===============================================================  PLAN:   Patient will benefit from skilled physical therapy consisting of: neuromuscular reeducation of: kinesthetic sense and posture for sitting  and/or standing activities, education in self care / home management training to include instruction in: symptom control techniques, therapeutic activities to achieve improved functional performance in: daily actvities and therapeutic exercise to develop: strength and endurance, flexibility and core stability    Assessment will be ongoing with changes in treatment as indicated.  Benefits/risks/alternatives to treatment have been reviewed and the patient has been instructed to contact this office if they have any questions or concerns.  This plan of care has been discussed with the patient and the patient is in agreement.     Frequency / Duration:  Patient will be seen for a total of 4-6 visits; 12 weeks    Total Visit Time: 60  minutes            Sendy Rodas, PT                                      Date:  8/24/2018    G Code 1/10  Mobility Status  G 8978 Current Status CJ 20-39% impaired  G 8979 Goal Status CI 1-19% impaired    G Codes established based on subjective and objective measures.    =====================================================  **  Referring Provider Certification: Referring Provider reviewing certifies that the above treatment / plan of care is required and authorized, and that the patient's plan will be reviewed every thirty (30) days **.   ======================================================     PRESENT:  NA    MULTIDISCIPLINARY PATIENT / FAMILY EDUCATION RECORD  Department:  Physical Therapy    Readiness to Learn: Ability to understand verbal instructions, Ability to understand written instructions, Knowledge of educational needs / treatment plan  Specific Barriers to Learning: None  Referrals: None  Learning Needs: Rehabilitation techniques to improve functional independence Pain management education to improve daily activity tolerance.  Who: Patient  How: Demonstration, Verbal instructions, Written instructions  Response: Appropriate verbal response, Asked questions,  Demonstrated ability, Verbalized recall / understanding

## 2018-08-24 NOTE — MR AVS SNAPSHOT
After Visit Summary   8/24/2018    Chasity Hodgson    MRN: [id number]           Patient Information     Date Of Birth          1945        Visit Information        Provider Department      8/24/2018 10:15 AM Sendy Rodas PT [facility]        Today's Diagnoses     Myalgia    -  1    Fibromyalgia           Follow-ups after your visit        Your next 10 appointments already scheduled     Sep 10, 2018 10:30 AM CDT   (Arrive by 10:15 AM)   RETURN HAIRLOSS with Renetta Meyer MD   [facility] ([facility])    [address] [place] [id number] [phone]            Sep 19, 2018 10:00 AM CDT   (Arrive by 9:30 AM)   New Visit with CELIA Rodrigues   [facility] ([facility])    [address] [facility] [address] [phone]            Sep 20, 2018 10:00 AM CDT   Return Visit with MAGALY Carlisle CNP   [facility] ([facility])    [address] [facility]  [address]  [facility] [address]   [phone]            Sep 26, 2018  9:00 AM CDT   Return Visit with CELIA Rodrigues   [facility] ([facility])    [address] [facility] [address] [phone]              Who to contact     If you have questions or need follow up information about today's clinic visit or your schedule please contact [facility] directly at [phone].  Normal or non-critical lab and imaging results will be communicated to you by MyChart, letter or phone within 4 business days after the clinic has received the results. If you do not hear from us within 7 days, please contact the clinic through MyChart or phone. If you have a critical or abnormal lab result, we will notify you by phone as soon as possible.  Submit refill requests through [employer] or call your pharmacy and they will forward the  refill request to us. Please allow 3 business days for your refill to be completed.          Additional Information About Your Visit        [employer] Information     [employer] gives you secure access to your electronic health record. If you see a primary care provider, you can also send messages to your care team and make appointments. If you have questions, please call your primary care clinic.  If you do not have a primary care provider, please call [phone] and they will assist you.        Care EveryWhere ID     This is your Care EveryWhere ID. This could be used by other organizations to access your [facility] medical records  [id number]         Blood Pressure from Last 3 Encounters:   08/22/18 152/72   08/14/18 173/72   08/01/18 134/70    Weight from Last 3 Encounters:   08/22/18 93.4 kg (206 lb)   08/14/18 93.4 kg (206 lb)   08/01/18 91.6 kg (202 lb)              We Performed the Following     C MOBILITY CURRENT STATUS     C MOBILITY GOAL STATUS     HC PT EVAL, MODERATE COMPLEXITY     THERAPEUTIC ACTIVITIES        Primary Care Provider Office Phone # Fax #    Shola Benoit [phone] [address]  [place] [address]        Equal Access to Services     CORINNE ROBB : Hadii domi acosta hadjudyo Sobhavna, waaxda luqadaha, qaybta kaalmada adeegyada, jose kim. So [facility] [phone].    ATENCIÓN: Si habla español, tiene a modi disposición servicios gratuitos de asistencia lingüística. Llame al [phone].    We comply with applicable federal civil rights laws and [place] laws. We do not discriminate on the basis of race, color, national origin, age, disability, sex, sexual orientation, or gender identity.            Thank you!     Thank you for choosing [facility]  for your care. Our goal is always to provide you with excellent care. Hearing back from our patients is one way we can continue to improve our services. Please take a  few minutes to complete the written survey that you may receive in the mail after your visit with us. Thank you!             Your Updated Medication List - Protect others around you: Learn how to safely use, store and throw away your medicines at www.disposemymeds.org.          This list is accurate as of 8/24/18 12:45 PM.  Always use your most recent med list.                   Brand Name Dispense Instructions for use Diagnosis    ascorbic acid 1000 MG Tabs    vitamin C     Take 1,000 mg by mouth daily        ASTEPRO NA           atenolol 50 MG tablet    TENORMIN    180 tablet    Take 1 tablet (50 mg) by mouth 2 times daily    Essential hypertension       BIOTIN PO      Take 1,000 mcg by mouth        blood glucose monitoring test strip    no brand specified    1 Box    Use to test blood sugar 1 times daily or as directed.    Type 2 diabetes mellitus with other specified complication (H)       clotrimazole 1 % cream    LOTRIMIN     Apply topically daily        cycloSPORINE 0.05 % ophthalmic emulsion    RESTASIS     1 drop 2 times daily        diphenhydrAMINE-acetaminophen  MG tablet    TYLENOL PM     Take 1 tablet by mouth At Bedtime Reported on 3/20/2017        famotidine 40 MG tablet    PEPCID     Take 40 mg by mouth At Bedtime        fenofibrate 145 MG tablet     90 tablet    Take 1 tablet (145 mg) by mouth daily    CARDIOVASCULAR SCREENING; LDL GOAL LESS THAN 130       ferrous sulfate 325 (65 Fe) MG tablet    IRON     Take 325 mg by mouth daily (with breakfast)        fexofenadine 180 MG tablet    ALLEGRA    120    Take 180 mg by mouth daily.        fluticasone 50 MCG/ACT spray    FLONASE    1 Bottle    Spray 2 sprays in nostril daily 2 sprays in each nostril qd    Seasonal allergic rhinitis due to other allergic trigger       gabapentin 100 MG capsule    NEURONTIN    90 capsule    Take 1 capsule (100 mg) by mouth every evening as needed    Poliomyelitis       hydrochlorothiazide 25 MG tablet     HYDRODIURIL    90 tablet    Take 1 tablet (25 mg) by mouth daily    Essential hypertension       lisinopril 20 MG tablet    PRINIVIL/ZESTRIL    90 tablet    Take 1 tablet (20 mg) by mouth daily    Essential hypertension       methocarbamol 500 MG tablet    ROBAXIN    120 tablet    Take 1-2 tablets (500-1,000 mg) by mouth 3 times daily as needed for muscle spasms    Myalgia       metoclopramide 10 MG tablet    REGLAN     Take 1-2 tabs as needed        multivitamin per tablet     100    ONE DAILY        nitroGLYcerin 0.4 MG sublingual tablet    NITROSTAT    25 tablet    Place 1 tablet (0.4 mg) under the tongue every 5 minutes as needed for chest pain (no more than 3 in one hour; after 3rd, call 911.)    Atypical chest pain       nystatin cream    MYCOSTATIN     Apply topically daily as needed        nystatin-triamcinolone cream    MYCOLOG II     Apply topically daily as needed        ondansetron 4 MG tablet    ZOFRAN     Take 1 tablet by mouth every 6 hours as needed Reported on 3/20/2017        order for DME     1 Units    Equipment being ordered: Oral appliance for sleep apnea    ANNETTE (obstructive sleep apnea)       order for DME     2 each    Equipment being ordered: Compression stockings - Knee High; 20-30 mmHg compression    Insufficiency, arterial, peripheral (H)       order for DME     1 each    Donut Pillow    Coccydynia       pantoprazole 40 MG EC tablet    PROTONIX     Take 40 mg by mouth 2 times daily        SB LOW DOSE ASA EC 81 MG EC tablet   Generic drug:  aspirin      Take 81 mg by mouth daily        sitagliptin 50 MG tablet    JANUVIA     Take 50 mg by mouth daily        SYNTHROID 112 MCG tablet   Generic drug:  levothyroxine      Take 112 mcg by mouth daily Take 112 mcg daily except for Friday takes an additional 56 mcg.  Brand name Synthroid        TOUJEO SOLOSTAR 300 UNIT/ML injection   Generic drug:  insulin glargine U-300      Inject 34 Units Subcutaneous every morning        triamcinolone 0.1 %  cream    KENALOG     Apply topically daily        VITAMIN D-3 PO      Take 2 tablets by mouth

## 2018-09-10 ENCOUNTER — OFFICE VISIT (OUTPATIENT)
Dept: DERMATOLOGY | Facility: CLINIC | Age: 73
End: 2018-09-10
Payer: MEDICARE

## 2018-09-10 DIAGNOSIS — L71.9 ACNE ROSACEA: Primary | ICD-10-CM

## 2018-09-10 DIAGNOSIS — L23.5 ALLERGIC DERMATITIS DUE TO OTHER CHEMICAL PRODUCT: ICD-10-CM

## 2018-09-10 DIAGNOSIS — L65.9 ALOPECIA: ICD-10-CM

## 2018-09-10 ASSESSMENT — PAIN SCALES - GENERAL: PAINLEVEL: NO PAIN (0)

## 2018-09-10 NOTE — PATIENT INSTRUCTIONS
Preventive Care:    Diabetic Eye Exam Screening: During our visit today, we discussed that it is recommended you receive diabetic eye exam screening. Please call or make an appointment with your primary care provider to discuss this with them. You may also call the [facility] scheduling line ([phone]) to set up an eye exam at one of the [facility] Eye Clinics.

## 2018-09-10 NOTE — PROGRESS NOTES
"[facility] Dermatology Note    Dermatology Problem List:  1. Female pattern hair loss  -currently treating with laser comb 3x per week and free and clear shampoo (given allergic contact dermatitis)      2. Rosacea  -currently treating with Vanicream   -referred to Dr. Poon's [facility] for consideration of pulse dye or other  laser treatment     3. Venous stasis dermatitis  -continue to apply Vanicream daily  -previous treatment: has previously applied TAC 0.1% to areas that are pruritic and scaly      4. Allergic contact dermatitis   -patient uses fragrance-free products  - patch testing revealed mild reaction  to [place]alsam of [place], fragrance mix, and  a strong reaction to paraphenylenediamine.     5. Dry scalp  - Current tx: olive oil, OTC hydrocortisone.      Encounter Date: Sep 10, 2018    CC:  Chief Complaint   Patient presents with     Hair Loss     Androgenetic alopecia - \"I think it's doing okay.\"     History of Present Illness:  Ms. Chasity Hodgson is a 72 year old female who presents as a follow-up for androgenetic alopecia. The patient was last seen 12/22/17. Today, the patient reports that her hair loss is \"doing ok.\" She is using LLLT roughly three times a week and uses free and clear shampoo three times weekly as well. The patient avoids all fragrances due prior patch testing performed at [facility] with Dr. Armas. From this test, it was noted that the patient mildly reacts to Balsam of [place], fragrance mix, and had a strong reaction to paraphenylenediamine. Since the lattermost compound is found in most hair dyes, the patient has been using a special hair dye free of paraphenylenediamine since this testing (along with avoiding all fragrances). The LLLT seems to be facilitating hair regrowth, the patient thinks. In regards to the stasis pigmentation in her lower extremities, the patient reports that she started wearing compression stockings since the last visit, and this has helped " her immensely - she even wore the stockings to this visit. Her rosacea is also very well controlled. The patient has one new concern to address today: a dry scalp which has been bothering her considerably. She heard that applying olive oil to the scalp could help this condition and asks today if that is reasonable. Labs were obtained at the last visit and were all normal. Otherwise, she has no other concerns.     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)     Anemia due to blood loss, acute     ACP (advance care planning)     Postsurgical hypothyroidism     Type 2 diabetes, HbA1c goal < 7% (H)     Gastroparesis     Papillary carcinoma of thyroid (H)     Alopecia     Pulmonary nodule     Esophageal reflux     Dermatitis     Acne rosacea     Diabetic gastroparesis (H)     Poliomyelitis     Left knee pain     Carotid stenosis     Right shoulder pain     Type 2 diabetes mellitus with other specified complication (H)     Insufficiency, arterial, peripheral (H)     Allergic dermatitis due to other chemical product     Normocytic anemia     Morbid obesity (H)     Past Medical History:   Diagnosis Date     Abdominal adhesions 1984, 96,99    s/p lysis     Allergic rhinitis, cause unspecified      Carotid stenosis      CPAP (continuous positive airway pressure) dependence      Diabetic gastroparesis (H)      Diet-controlled type 2 diabetes mellitus (H)      DVT of axillary vein, acute right (H)      Fibromyalgia      Gastro-oesophageal reflux disease      Hernia of unspecified site of abdominal cavity without mention of obstruction or gangrene     bilateral     HTN (hypertension)      Hypertriglyceridemia      Obstructive sleep apnea      ANNETTE  (obstructive sleep apnea)      Papillary carcinoma of thyroid (H)     s/p thyroidectomy - Ruegemer     PE (pulmonary embolism)      Poliomyelitis     poor circulation right leg     Postsurgical hypothyroidism     s/p papillary thryoid cancer - Ruegemer     Pulmonary embolism (H)      Rosacea      S/P carpal tunnel release     bilateral     S/P hardware removal 01/2014    still with lingering foot pain     S/P shoulder surgery     bilateral     Septic joint (H)     right knee     Venous insufficiency      Venous thrombosis 1999    right axillary vein     Past Surgical History:   Procedure Laterality Date     AMPUTATE TOE(S)  3/15/2012    Procedure:AMPUTATE TOE(S); Surgeon:SUKHDEEP METZ; Location: OR     APPENDECTOMY  1972     ARTHRODESIS FOOT  3/15/2012    Procedure:ARTHRODESIS FOOT; RIGHT TRIPLE ARTHRODESIS, FIFTH TOE AMPUTATION, LATERAL LIGAMENT RECONSTRUCTION, TENDON TRANSFER AND RELEASE [MINI C-ARM, ARTHREX 4.5 AND 6.7 STAPLES, BIOCOMPOSITE TENODESIS SCREWS]; Surgeon:SUKHDEEP METZ; Location: OR      FREEING BOWEL ADHESION,ENTEROLYSIS      1986, 1996, 1999     C NONSPECIFIC PROCEDURE      throidectomy     C TOTAL ABDOM HYSTERECTOMY  1980    + BSO     CHOLECYSTECTOMY       COLOSTOMY  2/7/2012    Procedure:COLOSTOMY; CREATION OF SIGMOID COLOSTOMY AND EXTENSIVE  LYSIS OF ADHESIONS; Surgeon:MONTSERRAT BENDER P; Location: OR     GI SURGERY      weakened rectal sphincter with artificial stimulator     LAPAROTOMY, LYSIS ADHESIONS, COMBINED  2/7/2012    Procedure:COMBINED LAPAROTOMY, LYSIS ADHESIONS; Surgeon:MONTSERRAT BENDER; Location: OR     RELEASE TENDON FOOT  3/15/2012    Procedure:RELEASE TENDON FOOT; Surgeon:SUKHDEEP METZ; Location: OR     REMOVE HARDWARE FOOT  12/13/2012    Procedure: REMOVE HARDWARE FOOT;  RIGHT FOOT REMOVAL OF HARDWARE;  Surgeon: Sukhdeep Metz MD;  Location:  OR     Social History:  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      Family History:  FPHL in patient's sister, mom, and grandmother.     Medications:  Current Outpatient Prescriptions   Medication Sig Dispense Refill     ascorbic acid (VITAMIN C) 1000 MG TABS Take 1,000 mg by mouth daily       aspirin (SB LOW DOSE ASA EC) 81 MG EC tablet Take 81 mg by mouth daily       atenolol (TENORMIN) 50 MG tablet Take 1 tablet (50 mg) by mouth 2 times daily 180 tablet 3     Azelastine HCl (ASTEPRO NA)        BIOTIN PO Take 1,000 mcg by mouth       blood glucose monitoring (NO BRAND SPECIFIED) test strip Use to test blood sugar 1 times daily or as directed. 1 Box 6     Cholecalciferol (VITAMIN D-3 PO) Take 2 tablets by mouth       clotrimazole (LOTRIMIN) 1 % cream Apply topically daily       cycloSPORINE (RESTASIS) 0.05 % ophthalmic emulsion 1 drop 2 times daily       diphenhydrAMINE-acetaminophen (TYLENOL PM)  MG tablet Take 1 tablet by mouth At Bedtime Reported on 3/20/2017       famotidine (PEPCID) 40 MG tablet Take 40 mg by mouth At Bedtime       fenofibrate 145 MG tablet Take 1 tablet (145 mg) by mouth daily 90 tablet 1     ferrous sulfate (IRON) 325 (65 FE) MG tablet Take 325 mg by mouth daily (with breakfast)       fexofenadine (ALLEGRA) 180 MG tablet Take 180 mg by mouth daily. 120 0     fluticasone (FLONASE) 50 MCG/ACT spray Spray 2 sprays in nostril daily 2 sprays in each nostril qd 1 Bottle 0     gabapentin (NEURONTIN) 100 MG capsule Take 1 capsule (100 mg) by mouth every evening as needed 90 capsule 3     hydrochlorothiazide (HYDRODIURIL) 25 MG tablet Take 1 tablet (25 mg) by mouth daily 90 tablet 3     insulin glargine U-300 (TOUJEO SOLOSTAR) 300 UNIT/ML injection Inject 34 Units Subcutaneous every morning        levothyroxine (SYNTHROID) 112 MCG tablet Take 112 mcg by mouth daily Take 112 mcg daily except for Friday takes an additional 56 mcg.  Brand name Synthroid       lisinopril (PRINIVIL/ZESTRIL) 20 MG tablet Take 1 tablet (20 mg) by mouth daily 90 tablet  2     metoclopramide (REGLAN) 10 MG tablet Take 1-2 tabs as needed       MULTIVITAMINS OR TABS ONE DAILY 100 3     nitroGLYcerin (NITROSTAT) 0.4 MG sublingual tablet Place 1 tablet (0.4 mg) under the tongue every 5 minutes as needed for chest pain (no more than 3 in one hour; after 3rd, call 911.) 25 tablet 3     nystatin (MYCOSTATIN) cream Apply topically daily as needed        nystatin-triamcinolone (MYCOLOG II) cream Apply topically daily as needed       ondansetron (ZOFRAN) 4 MG tablet Take 1 tablet by mouth every 6 hours as needed Reported on 3/20/2017       order for DME Equipment being ordered: Compression stockings - Knee High; 20-30 mmHg compression 2 each 0     order for DME Donut Pillow 1 each 0     order for DME Equipment being ordered: Oral appliance for sleep apnea 1 Units 0     pantoprazole (PROTONIX) 40 MG EC tablet Take 40 mg by mouth 2 times daily       sitagliptin (JANUVIA) 50 MG tablet Take 50 mg by mouth daily       triamcinolone (KENALOG) 0.1 % cream Apply topically daily       methocarbamol (ROBAXIN) 500 MG tablet Take 1-2 tablets (500-1,000 mg) by mouth 3 times daily as needed for muscle spasms (Patient not taking: Reported on 9/10/2018) 120 tablet 0     Allergies   Allergen Reactions     Nsaids Difficulty breathing     Increased creatinine     Toradol Difficulty breathing     Shortness of breath     Celecoxib Itching and Rash     Codeine Itching     With higher doses     Crestor [Rosuvastatin] Muscle Pain (Myalgia)     No Clinical Screening - See Comments Itching     Fragrance     Vioxx Other (See Comments)     Heart races     Conjugated Estrogens Rash     Sulfa Drugs Rash     Review of Systems:  -Constitutional: The patient is feeling generally well.   -Skin: As above in HPI. No additional skin concerns.    Physical exam:  Vitals: There were no vitals taken for this visit.  GEN: This is a well developed, well-nourished female in no acute distress, in a pleasant mood.    SKIN: Focused  examination of the face, scalp and fingernails was performed.  - Patchy erythema throughout the scalp, particularly at the nape of the neck  - A part width of 1.2 was present. The layers of hair regrowth were noted to be 1 cm for the first layer, a robust 3-4 cm at the second. Eyelashes and eyebrows are within normal limits. Nails are without pitting, onycholysis or onychodystrophy.          -No other lesions of concern on areas examined.     Impression/Plan:  1. Androgenetic alopecia: appears clinically stable when compared to photos from previous visits.  She continues to experience thinning along midline part width and frontal scalp with a [place] tree pattern present on examination.      Continue to use Free and Clear shampoo 3x weekly    Continue photobiomodulation device 3x per week    Photographs taken for future reference     2. Venous stasis     Continue to wear compression stalking daily    Continue to apply Vanicream to bilateral lower extremities at least once per day.     3. Dry scalp    Advised the patient that applying olive oil to the scalp as needed for dryness is reasonable. She can also apply OTC hydrocortisone as needed for any pruritus in the dry areas.     Follow-up in 9-12 months, earlier for new or changing lesions.     Staff Involved:  Staff/Scribe    Scribe Disclosure  I, Cory Alegre, am serving as a scribe to document services personally performed by Dr. Renetta Meyer MD, based on data collection and the provider's statements to me.     Staff Physician:  I have verified the history and personally performed the physical exam and medical decision making. I agree with the assessment and plan of care as documented in the note.         Renetta Meyer MD  Professor and Chair  Department of Dermatology  [facility]: Phone: [phone], Fax:[phone]  [facility]: Phone:  [phone], Fax: [phone]  10/7/2018

## 2018-09-10 NOTE — MR AVS SNAPSHOT
After Visit Summary   9/10/2018    Chasity Hodgson    MRN: [id number]           Patient Information     Date Of Birth          1945        Visit Information        Provider Department      9/10/2018 10:30 AM Renetta Meyer MD [facility] Dermatology        Care Instructions    Preventive Care:    Diabetic Eye Exam Screening: During our visit today, we discussed that it is recommended you receive diabetic eye exam screening. Please call or make an appointment with your primary care provider to discuss this with them. You may also call the [facility] scheduling line ([phone]) to set up an eye exam at one of the [facility] Eye Clinics.              Follow-ups after your visit        Your next 10 appointments already scheduled     Sep 19, 2018 10:00 AM CDT   (Arrive by 9:30 AM)   New Visit with CELIA Rodrigues   [facility] ([facility])    [address] [facility] [address] [phone]            Sep 20, 2018 10:00 AM CDT   Return Visit with MAGALY Carlisle [facility] ([facility])    [address] [address] [address]   [phone]            Sep 26, 2018  9:00 AM CDT   Return Visit with CELIA Rodrigues   [facility] ([facility])    [address] [facility] [address] [phone]              Who to contact     Please call your clinic at [phone] to:    Ask questions about your health    Make or cancel appointments    Discuss your medicines    Learn about your test results    Speak to your doctor            Additional Information About Your Visit        MyChart Information     [employer] gives you secure access to your electronic health record. If you see a primary care provider, you can also send messages to your care team and make appointments. If you have questions, please call your primary care clinic.  If you do not have a  primary care provider, please call [phone] and they will assist you.      "[employer] is an electronic gateway that provides easy, online access to your medical records. With "[employer] you can request a clinic appointment, read your test results, renew a prescription or communicate with your care team.     To access your existing account, please contact your [facility] or call [phone] for assistance.        Care EveryWhere ID     This is your Care EveryWhere ID. This could be used by other organizations to access your [place] medical records  [id number]         Blood Pressure from Last 3 Encounters:   08/22/18 152/72   08/14/18 173/72   08/01/18 134/70    Weight from Last 3 Encounters:   08/22/18 93.4 kg (206 lb)   08/14/18 93.4 kg (206 lb)   08/01/18 91.6 kg (202 lb)              Today, you had the following     No orders found for display       Primary Care Provider Office Phone # Fax #    Shola Benoit [phone] [address]  [place] [address]        Equal Access to Services     [facility]: Hadii aad ku hadasho Soomaali, waaxda luqadaha, qaybta kaalmada adejosé miguelyada, jose baldwin . So [facility] [phone].    ATENCIÓN: Si habla español, tiene a modi disposición servicios gratuitos de asistencia lingüística. Llame al [phone].    We comply with applicable federal civil rights laws and [place] laws. We do not discriminate on the basis of race, color, national origin, age, disability, sex, sexual orientation, or gender identity.            Thank you!     Thank you for choosing [facility] DERMATOLOGY  for your care. Our goal is always to provide you with excellent care. Hearing back from our patients is one way we can continue to improve our services. Please take a few minutes to complete the written survey that you may receive in the mail after your visit with us. Thank you!             Your Updated  Medication List - Protect others around you: Learn how to safely use, store and throw away your medicines at www.disposemymeds.org.          This list is accurate as of 9/10/18 11:22 AM.  Always use your most recent med list.                   Brand Name Dispense Instructions for use Diagnosis    ascorbic acid 1000 MG Tabs    vitamin C     Take 1,000 mg by mouth daily        ASTEPRO NA           atenolol 50 MG tablet    TENORMIN    180 tablet    Take 1 tablet (50 mg) by mouth 2 times daily    Essential hypertension       BIOTIN PO      Take 1,000 mcg by mouth        blood glucose monitoring test strip    no brand specified    1 Box    Use to test blood sugar 1 times daily or as directed.    Type 2 diabetes mellitus with other specified complication (H)       clotrimazole 1 % cream    LOTRIMIN     Apply topically daily        cycloSPORINE 0.05 % ophthalmic emulsion    RESTASIS     1 drop 2 times daily        diphenhydrAMINE-acetaminophen  MG tablet    TYLENOL PM     Take 1 tablet by mouth At Bedtime Reported on 3/20/2017        famotidine 40 MG tablet    PEPCID     Take 40 mg by mouth At Bedtime        fenofibrate 145 MG tablet     90 tablet    Take 1 tablet (145 mg) by mouth daily    CARDIOVASCULAR SCREENING; LDL GOAL LESS THAN 130       ferrous sulfate 325 (65 Fe) MG tablet    IRON     Take 325 mg by mouth daily (with breakfast)        fexofenadine 180 MG tablet    ALLEGRA    120    Take 180 mg by mouth daily.        fluticasone 50 MCG/ACT spray    FLONASE    1 Bottle    Spray 2 sprays in nostril daily 2 sprays in each nostril qd    Seasonal allergic rhinitis due to other allergic trigger       gabapentin 100 MG capsule    NEURONTIN    90 capsule    Take 1 capsule (100 mg) by mouth every evening as needed    Poliomyelitis       hydrochlorothiazide 25 MG tablet    HYDRODIURIL    90 tablet    Take 1 tablet (25 mg) by mouth daily    Essential hypertension       lisinopril 20 MG tablet    PRINIVIL/ZESTRIL    90  tablet    Take 1 tablet (20 mg) by mouth daily    Essential hypertension       methocarbamol 500 MG tablet    ROBAXIN    120 tablet    Take 1-2 tablets (500-1,000 mg) by mouth 3 times daily as needed for muscle spasms    Myalgia       metoclopramide 10 MG tablet    REGLAN     Take 1-2 tabs as needed        multivitamin per tablet     100    ONE DAILY        nitroGLYcerin 0.4 MG sublingual tablet    NITROSTAT    25 tablet    Place 1 tablet (0.4 mg) under the tongue every 5 minutes as needed for chest pain (no more than 3 in one hour; after 3rd, call 911.)    Atypical chest pain       nystatin cream    MYCOSTATIN     Apply topically daily as needed        nystatin-triamcinolone cream    MYCOLOG II     Apply topically daily as needed        ondansetron 4 MG tablet    ZOFRAN     Take 1 tablet by mouth every 6 hours as needed Reported on 3/20/2017        order for DME     1 Units    Equipment being ordered: Oral appliance for sleep apnea    ANNETTE (obstructive sleep apnea)       order for DME     2 each    Equipment being ordered: Compression stockings - Knee High; 20-30 mmHg compression    Insufficiency, arterial, peripheral (H)       order for DME     1 each    Donut Pillow    Coccydynia       pantoprazole 40 MG EC tablet    PROTONIX     Take 40 mg by mouth 2 times daily        SB LOW DOSE ASA EC 81 MG EC tablet   Generic drug:  aspirin      Take 81 mg by mouth daily        sitagliptin 50 MG tablet    JANUVIA     Take 50 mg by mouth daily        SYNTHROID 112 MCG tablet   Generic drug:  levothyroxine      Take 112 mcg by mouth daily Take 112 mcg daily except for Friday takes an additional 56 mcg.  Brand name Synthroid        TOUJEO SOLOSTAR 300 UNIT/ML injection   Generic drug:  insulin glargine U-300      Inject 34 Units Subcutaneous every morning        triamcinolone 0.1 % cream    KENALOG     Apply topically daily        VITAMIN D-3 PO      Take 2 tablets by mouth

## 2018-09-10 NOTE — LETTER
"9/10/2018       RE: Chasity Hodgson  [address] [address]     Dear Colleague,    Thank you for referring your patient, Chasity Hodgson, to the [facility] DERMATOLOGY at [facility]. Please see a copy of my visit note below.    [facility] Dermatology Note    Dermatology Problem List:  1. Female pattern hair loss  -currently treating with laser comb 3x per week and free and clear shampoo (given allergic contact dermatitis)      2. Rosacea  -currently treating with Vanicream   -referred to Dr. Poon's [place] clinic for consideration of pulse dye or other  laser treatment     3. Venous stasis dermatitis  -continue to apply Vanicream daily  -previous treatment: has previously applied TAC 0.1% to areas that are pruritic and scaly      4. Allergic contact dermatitis   -patient uses fragrance-free products  - patch testing revealed mild reaction  to Balsam of [place], fragrance mix, and  a strong reaction to paraphenylenediamine.     5. Dry scalp  - Current tx: olive oil, OTC hydrocortisone.      Encounter Date: Sep 10, 2018    CC:  Chief Complaint   Patient presents with     Hair Loss     Androgenetic alopecia - \"I think it's doing okay.\"     History of Present Illness:  Ms. Chasity Hodgson is a 72 year old female who presents as a follow-up for androgenetic alopecia. The patient was last seen 12/22/17. Today, the patient reports that her hair loss is \"doing ok.\" She is using LLLT roughly three times a week and uses free and clear shampoo three times weekly as well. The patient avoids all fragrances due prior patch testing performed at [facility] with [place]. Armas. From this test, it was noted that the patient mildly reacts to Balsam of [place], fragrance mix, and had a strong reaction to paraphenylenediamine. Since the lattermost compound is found in most hair dyes, the patient has been using a special hair dye free of paraphenylenediamine since this testing " (along with avoiding all fragrances). The LLLT seems to be facilitating hair regrowth, the patient thinks. In regards to the stasis pigmentation in her lower extremities, the patient reports that she started wearing compression stockings since the last visit, and this has helped her immensely - she even wore the stockings to this visit. Her rosacea is also very well controlled. The patient has one new concern to address today: a dry scalp which has been bothering her considerably. She heard that applying olive oil to the scalp could help this condition and asks today if that is reasonable. Labs were obtained at the last visit and were all normal. Otherwise, she has no other concerns.     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)     Anemia due to blood loss, acute     ACP (advance care planning)     Postsurgical hypothyroidism     Type 2 diabetes, HbA1c goal < 7% (H)     Gastroparesis     Papillary carcinoma of thyroid (H)     Alopecia     Pulmonary nodule     Esophageal reflux     Dermatitis     Acne rosacea     Diabetic gastroparesis (H)     Poliomyelitis     Left knee pain     Carotid stenosis     Right shoulder pain     Type 2 diabetes mellitus with other specified complication (H)     Insufficiency, arterial, peripheral (H)     Allergic dermatitis due to other chemical product     Normocytic anemia     Morbid obesity (H)     Past Medical History:   Diagnosis Date     Abdominal adhesions 1984, 96,99    s/p lysis     Allergic rhinitis, cause unspecified      Carotid stenosis      CPAP (continuous positive airway pressure) dependence      Diabetic gastroparesis (H)      Diet-controlled type 2 diabetes mellitus (H)      DVT of  axillary vein, acute right (H)      Fibromyalgia      Gastro-oesophageal reflux disease      Hernia of unspecified site of abdominal cavity without mention of obstruction or gangrene     bilateral     HTN (hypertension)      Hypertriglyceridemia      Obstructive sleep apnea      ANNETTE (obstructive sleep apnea)      Papillary carcinoma of thyroid (H)     s/p thyroidectomy - Ruegemer     PE (pulmonary embolism)      Poliomyelitis     poor circulation right leg     Postsurgical hypothyroidism     s/p papillary thryoid cancer - Ruegemer     Pulmonary embolism (H)      Rosacea      S/P carpal tunnel release     bilateral     S/P hardware removal 01/2014    still with lingering foot pain     S/P shoulder surgery     bilateral     Septic joint (H)     right knee     Venous insufficiency      Venous thrombosis 1999    right axillary vein     Past Surgical History:   Procedure Laterality Date     AMPUTATE TOE(S)  3/15/2012    Procedure:AMPUTATE TOE(S); Surgeon:RUBEN MOSES; Location: OR     APPENDECTOMY  1972     ARTHRODESIS FOOT  3/15/2012    Procedure:ARTHRODESIS FOOT; RIGHT TRIPLE ARTHRODESIS, FIFTH TOE AMPUTATION, LATERAL LIGAMENT RECONSTRUCTION, TENDON TRANSFER AND RELEASE [MINI C-ARM, ARTHREX 4.5 AND 6.7 STAPLES, BIOCOMPOSITE TENODESIS SCREWS]; Surgeon:RUBEN MOSES; Location: OR      FREEING BOWEL ADHESION,ENTEROLYSIS      1986, 1996, 1999     C NONSPECIFIC PROCEDURE      throidectomy     C TOTAL ABDOM HYSTERECTOMY  1980    + BSO     CHOLECYSTECTOMY       COLOSTOMY  2/7/2012    Procedure:COLOSTOMY; CREATION OF SIGMOID COLOSTOMY AND EXTENSIVE  LYSIS OF ADHESIONS; Surgeon:MONTSERRAT BENDER P; Location: OR     GI SURGERY      weakened rectal sphincter with artificial stimulator     LAPAROTOMY, LYSIS ADHESIONS, COMBINED  2/7/2012    Procedure:COMBINED LAPAROTOMY, LYSIS ADHESIONS; Surgeon:MONTSERRAT BENDER P; Location: OR     RELEASE TENDON FOOT  3/15/2012    Procedure:RELEASE TENDON FOOT;  Surgeon:SUKHDEEP METZ; Location: OR     REMOVE HARDWARE FOOT  12/13/2012    Procedure: REMOVE HARDWARE FOOT;  RIGHT FOOT REMOVAL OF HARDWARE;  Surgeon: Sukhdeep Metz MD;  Location:  OR     Social History:  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      Family History:  FPHL in patient's sister, mom, and grandmother.     Medications:  Current Outpatient Prescriptions   Medication Sig Dispense Refill     ascorbic acid (VITAMIN C) 1000 MG TABS Take 1,000 mg by mouth daily       aspirin (SB LOW DOSE ASA EC) 81 MG EC tablet Take 81 mg by mouth daily       atenolol (TENORMIN) 50 MG tablet Take 1 tablet (50 mg) by mouth 2 times daily 180 tablet 3     Azelastine HCl (ASTEPRO NA)        BIOTIN PO Take 1,000 mcg by mouth       blood glucose monitoring (NO BRAND SPECIFIED) test strip Use to test blood sugar 1 times daily or as directed. 1 Box 6     Cholecalciferol (VITAMIN D-3 PO) Take 2 tablets by mouth       clotrimazole (LOTRIMIN) 1 % cream Apply topically daily       cycloSPORINE (RESTASIS) 0.05 % ophthalmic emulsion 1 drop 2 times daily       diphenhydrAMINE-acetaminophen (TYLENOL PM)  MG tablet Take 1 tablet by mouth At Bedtime Reported on 3/20/2017       famotidine (PEPCID) 40 MG tablet Take 40 mg by mouth At Bedtime       fenofibrate 145 MG tablet Take 1 tablet (145 mg) by mouth daily 90 tablet 1     ferrous sulfate (IRON) 325 (65 FE) MG tablet Take 325 mg by mouth daily (with breakfast)       fexofenadine (ALLEGRA) 180 MG tablet Take 180 mg by mouth daily. 120 0     fluticasone (FLONASE) 50 MCG/ACT spray Spray 2 sprays in nostril daily 2 sprays in each nostril qd 1 Bottle 0     gabapentin (NEURONTIN) 100 MG capsule Take 1 capsule (100 mg) by mouth every evening as needed 90 capsule 3     hydrochlorothiazide (HYDRODIURIL) 25 MG tablet Take 1 tablet (25 mg) by mouth daily 90 tablet 3     insulin glargine U-300 (TOUJEO SOLOSTAR) 300 UNIT/ML injection Inject 34 Units  Subcutaneous every morning        levothyroxine (SYNTHROID) 112 MCG tablet Take 112 mcg by mouth daily Take 112 mcg daily except for Friday takes an additional 56 mcg.  Brand name Synthroid       lisinopril (PRINIVIL/ZESTRIL) 20 MG tablet Take 1 tablet (20 mg) by mouth daily 90 tablet 2     metoclopramide (REGLAN) 10 MG tablet Take 1-2 tabs as needed       MULTIVITAMINS OR TABS ONE DAILY 100 3     nitroGLYcerin (NITROSTAT) 0.4 MG sublingual tablet Place 1 tablet (0.4 mg) under the tongue every 5 minutes as needed for chest pain (no more than 3 in one hour; after 3rd, call 911.) 25 tablet 3     nystatin (MYCOSTATIN) cream Apply topically daily as needed        nystatin-triamcinolone (MYCOLOG II) cream Apply topically daily as needed       ondansetron (ZOFRAN) 4 MG tablet Take 1 tablet by mouth every 6 hours as needed Reported on 3/20/2017       order for DME Equipment being ordered: Compression stockings - Knee High; 20-30 mmHg compression 2 each 0     order for DME Donut Pillow 1 each 0     order for DME Equipment being ordered: Oral appliance for sleep apnea 1 Units 0     pantoprazole (PROTONIX) 40 MG EC tablet Take 40 mg by mouth 2 times daily       sitagliptin (JANUVIA) 50 MG tablet Take 50 mg by mouth daily       triamcinolone (KENALOG) 0.1 % cream Apply topically daily       methocarbamol (ROBAXIN) 500 MG tablet Take 1-2 tablets (500-1,000 mg) by mouth 3 times daily as needed for muscle spasms (Patient not taking: Reported on 9/10/2018) 120 tablet 0     Allergies   Allergen Reactions     Nsaids Difficulty breathing     Increased creatinine     Toradol Difficulty breathing     Shortness of breath     Celecoxib Itching and Rash     Codeine Itching     With higher doses     Crestor [Rosuvastatin] Muscle Pain (Myalgia)     No Clinical Screening - See Comments Itching     Fragrance     Vioxx Other (See Comments)     Heart races     Conjugated Estrogens Rash     Sulfa Drugs Rash     Review of  Systems:  -Constitutional: The patient is feeling generally well.   -Skin: As above in HPI. No additional skin concerns.    Physical exam:  Vitals: There were no vitals taken for this visit.  GEN: This is a well developed, well-nourished female in no acute distress, in a pleasant mood.    SKIN: Focused examination of the face, scalp and fingernails was performed.  - Patchy erythema throughout the scalp, particularly at the nape of the neck  - A part width of 1.2 was present. The layers of hair regrowth were noted to be 1 cm for the first layer, a robust 3-4 cm at the second. Eyelashes and eyebrows are within normal limits. Nails are without pitting, onycholysis or onychodystrophy.          -No other lesions of concern on areas examined.     Impression/Plan:  1. Androgenetic alopecia: appears clinically stable when compared to photos from previous visits.  She continues to experience thinning along midline part width and frontal scalp with a Chen tree pattern present on examination.      Continue to use Free and Clear shampoo 3x weekly    Continue photobiomodulation device 3x per week    Photographs taken for future reference     2. Venous stasis     Continue to wear compression stalking daily    Continue to apply Vanicream to bilateral lower extremities at least once per day.     3. Dry scalp    Advised the patient that applying olive oil to the scalp as needed for dryness is reasonable. She can also apply OTC hydrocortisone as needed for any pruritus in the dry areas.     Follow-up in 9-12 months, earlier for new or changing lesions.     Staff Involved:  Staff/Scribe    Scribe Disclosure  I, Cory Alegre, am serving as a scribe to document services personally performed by Dr. Renetta Meyer MD, based on data collection and the provider's statements to me.     Staff Physician:  I have verified the history and personally performed the physical exam and medical decision making. I agree with the assessment and  plan of care as documented in the note.         Renetta Meyer MD  Professor and Chair  Department of Dermatology  [facility]: Phone: [phone], Fax:[phone]  [facility]: Phone: [phone], Fax: [phone]  10/7/2018        Pictures were placed in Pt's chart today for future reference.              Again, thank you for allowing me to participate in the care of your patient.      Sincerely,    Renetta Meyer MD

## 2018-09-10 NOTE — NURSING NOTE
"Dermatology Rooming Note    Chasity Hodgson's goals for this visit include:   Chief Complaint   Patient presents with     Hair Loss     Androgenetic alopecia - \"I think it's doing okay.\"     Saloni Castaneda, LESVIA  "

## 2018-09-19 ENCOUNTER — OFFICE VISIT (OUTPATIENT)
Dept: PSYCHOLOGY | Facility: CLINIC | Age: 73
End: 2018-09-19
Attending: INTERNAL MEDICINE
Payer: MEDICARE

## 2018-09-19 DIAGNOSIS — F43.22 ADJUSTMENT DISORDER WITH ANXIETY: Primary | ICD-10-CM

## 2018-09-19 PROCEDURE — 90791 PSYCH DIAGNOSTIC EVALUATION: CPT | Performed by: MARRIAGE & FAMILY THERAPIST

## 2018-09-19 ASSESSMENT — ANXIETY QUESTIONNAIRES
GAD7 TOTAL SCORE: 1
3. WORRYING TOO MUCH ABOUT DIFFERENT THINGS: NOT AT ALL
1. FEELING NERVOUS, ANXIOUS, OR ON EDGE: NOT AT ALL
IF YOU CHECKED OFF ANY PROBLEMS ON THIS QUESTIONNAIRE, HOW DIFFICULT HAVE THESE PROBLEMS MADE IT FOR YOU TO DO YOUR WORK, TAKE CARE OF THINGS AT HOME, OR GET ALONG WITH OTHER PEOPLE: SOMEWHAT DIFFICULT
7. FEELING AFRAID AS IF SOMETHING AWFUL MIGHT HAPPEN: NOT AT ALL
6. BECOMING EASILY ANNOYED OR IRRITABLE: SEVERAL DAYS
2. NOT BEING ABLE TO STOP OR CONTROL WORRYING: NOT AT ALL
5. BEING SO RESTLESS THAT IT IS HARD TO SIT STILL: NOT AT ALL

## 2018-09-19 ASSESSMENT — PATIENT HEALTH QUESTIONNAIRE - PHQ9: 5. POOR APPETITE OR OVEREATING: NOT AT ALL

## 2018-09-19 NOTE — PROGRESS NOTES
Adult Intake Structured Interview  Standard Diagnostic Assessment      CLIENT'S NAME: Chasity Hodgson  MRN:   [id number]  :   1945  ACCT. NUMBER: [id number]  DATE OF SERVICE: 18      Identifying Information:  Client is a 72 year old, ,  female. Client was referred for counseling by Shola Benoit at [facility]. Client is currently employed part time. Client attended the session alone.       Client's Statement of Presenting Concern:  Client reports the reason for seeking therapy at this time as feeling overwhelmed. Her  was diagnosed with Parkinson's in 2017 and it has increased her household responsibilities because  has stopped doing certain things around the home. He has also been more irritable and more difficult to get along with in dealing with his health diagnosis. Client stated that her symptoms have resulted in the following functional impairments: management of the household and or completion of tasks, relationship(s) and self-care      History of Presenting Concern:  Client reports that these problem(s) began 1 year ago after her  was diagnosed with Parkinson's. Client has not attempted to resolve these concerns in the past. Client reports that other professional(s) are not involved in providing support / services.       Social History:  Client reported she grew up in [place], [place]. They were the third born of 5 children. This is an intact family and parents remain . Client reported that her childhood was average, did everything that other people did. Client described her current relationships with family of origin as very good. Relationship with family of creation is very good.    Client reported a history of 1 committed relationships or marriages. Client  has been  for 48 years. Client reported having 2 children. Client identified extensive stable and meaningful social connections. Client reported that she has not been involved with the legal system. Client's highest education level was high school graduate. Client did not identify any learning problems. There are no ethnic, cultural or Pentecostal factors that may be relevant for therapy. Client identified her preferred language to be English. Client reported she does not need the assistance of an  or other support involved in therapy. Modifications will not be used to assist communication in therapy. Client did not serve in the .     Client reports family history includes Arthritis in her mother, sister, and sister; Cancer in her maternal grandmother, sister, and sister; Cerebrovascular Disease in her mother; Diabetes in her father; HEART DISEASE in her brother and mother; Hypertension in her brother, father, mother, sister, and sister; Lipids in her brother and sister; Obesity in her maternal grandmother, mother, and sister; Respiratory in her father; Skin Cancer in her maternal grandmother.    Mental Health History:  Client reported the following biological family members or relatives with mental health issues: Son experienced Anxiety and Aunt experienced Depression.  Client previously received the following mental health diagnosis: unspecified, for grief and loss in 1973 after death of daughter born at 24 weeks.  Client has received the following mental health services in the past: counseling.  Hospitalizations: None.  Client is not currently receiving any mental health services.      Chemical Health History:  Client reported no family history of chemical health issues. Client has not received chemical dependency treatment in the past. Client is not currently receiving any chemical dependency treatment. Client reports no problems as a result of their drinking / drug use.      Client  "Reports:  Client denies using alcohol.  Client denies using tobacco.  Client denies using marijuana.  Client reports using caffeine 1-2 times per week and drinks 1 at a time. Client started using caffeine at age not reported.  Client denies using street drugs.  Client denies the non-medical use of prescription or over the counter drugs.    CAGE: None of the patient's responses to the CAGE screening were positive / Negative CAGE score   Based on the negative Cage-Aid score and clinical interview there  are not indications of drug or alcohol abuse.    Discussed the general effects of drugs and alcohol on health and well-being. Therapist gave client printed information about the effects of chemical use on her health and well being.      Significant Losses / Trauma / Abuse / Neglect Issues:  There are indications or report of significant loss, trauma, abuse or neglect issues related to: death of brother in , daughter born premature in   after 7 hours, major medical problems polio as a child and  diagnosed with Parkinson's in ,  fell off a ladder in  and fractured his skull.    Issues of possible neglect are not present.      Medical Issues:  Client has had a physical exam to rule out medical causes for current symptoms. Date of last physical exam was within the past year. Client was encouraged to follow up with PCP if symptoms were to develop. The client has a [facility] Provider, who is named Shola Benoit.. The client reports not having a psychiatrist. Client reports the following current medical concerns: \"Polio in 1948, Thyroid CA in , Currently have diabetes\". The client reports the presence of chronic or episodic pain in the form of upper arms, had shoulder replacement , left knee arthritis. The pain level is moderate to severe and has a frequency of ongoing.. There are significant nutritional concerns, client would like to lose weight.     Client " reports current meds as:   Current Outpatient Prescriptions   Medication Sig     ascorbic acid (VITAMIN C) 1000 MG TABS Take 1,000 mg by mouth daily     aspirin (SB LOW DOSE ASA EC) 81 MG EC tablet Take 81 mg by mouth daily     atenolol (TENORMIN) 50 MG tablet Take 1 tablet (50 mg) by mouth 2 times daily     Azelastine HCl (ASTEPRO NA)      BIOTIN PO Take 1,000 mcg by mouth     blood glucose monitoring (NO BRAND SPECIFIED) test strip Use to test blood sugar 1 times daily or as directed.     Cholecalciferol (VITAMIN D-3 PO) Take 2 tablets by mouth     clotrimazole (LOTRIMIN) 1 % cream Apply topically daily     cycloSPORINE (RESTASIS) 0.05 % ophthalmic emulsion 1 drop 2 times daily     diphenhydrAMINE-acetaminophen (TYLENOL PM)  MG tablet Take 1 tablet by mouth At Bedtime Reported on 3/20/2017     famotidine (PEPCID) 40 MG tablet Take 40 mg by mouth At Bedtime     fenofibrate 145 MG tablet Take 1 tablet (145 mg) by mouth daily     ferrous sulfate (IRON) 325 (65 FE) MG tablet Take 325 mg by mouth daily (with breakfast)     fexofenadine (ALLEGRA) 180 MG tablet Take 180 mg by mouth daily.     fluticasone (FLONASE) 50 MCG/ACT spray Spray 2 sprays in nostril daily 2 sprays in each nostril qd     gabapentin (NEURONTIN) 100 MG capsule Take 1 capsule (100 mg) by mouth every evening as needed     hydrochlorothiazide (HYDRODIURIL) 25 MG tablet Take 1 tablet (25 mg) by mouth daily     insulin glargine U-300 (TOUJEO SOLOSTAR) 300 UNIT/ML injection Inject 34 Units Subcutaneous every morning      levothyroxine (SYNTHROID) 112 MCG tablet Take 112 mcg by mouth daily Take 112 mcg daily except for Friday takes an additional 56 mcg.  Brand name Synthroid     lisinopril (PRINIVIL/ZESTRIL) 20 MG tablet Take 1 tablet (20 mg) by mouth daily     methocarbamol (ROBAXIN) 500 MG tablet Take 1-2 tablets (500-1,000 mg) by mouth 3 times daily as needed for muscle spasms (Patient not taking: Reported on 9/10/2018)     metoclopramide (REGLAN)  10 MG tablet Take 1-2 tabs as needed     MULTIVITAMINS OR TABS ONE DAILY     nitroGLYcerin (NITROSTAT) 0.4 MG sublingual tablet Place 1 tablet (0.4 mg) under the tongue every 5 minutes as needed for chest pain (no more than 3 in one hour; after 3rd, call 911.)     nystatin (MYCOSTATIN) cream Apply topically daily as needed      nystatin-triamcinolone (MYCOLOG II) cream Apply topically daily as needed     ondansetron (ZOFRAN) 4 MG tablet Take 1 tablet by mouth every 6 hours as needed Reported on 3/20/2017     order for DME Equipment being ordered: Compression stockings - Knee High; 20-30 mmHg compression     order for DME Donut Pillow     order for DME Equipment being ordered: Oral appliance for sleep apnea     pantoprazole (PROTONIX) 40 MG EC tablet Take 40 mg by mouth 2 times daily     sitagliptin (JANUVIA) 50 MG tablet Take 50 mg by mouth daily     triamcinolone (KENALOG) 0.1 % cream Apply topically daily     No current facility-administered medications for this visit.        Client Allergies:  Allergies   Allergen Reactions     Nsaids Difficulty breathing     Increased creatinine     Toradol Difficulty breathing     Shortness of breath     Celecoxib Itching and Rash     Codeine Itching     With higher doses     Crestor [Rosuvastatin] Muscle Pain (Myalgia)     No Clinical Screening - See Comments Itching     Fragrance     Vioxx Other (See Comments)     Heart races     Conjugated Estrogens Rash     Sulfa Drugs Rash         Medical History:  Past Medical History:   Diagnosis Date     Abdominal adhesions 1984, 96,99    s/p lysis     Allergic rhinitis, cause unspecified      Carotid stenosis      CPAP (continuous positive airway pressure) dependence      Diabetic gastroparesis (H)      Diet-controlled type 2 diabetes mellitus (H)      DVT of axillary vein, acute right (H)      Fibromyalgia      Gastro-oesophageal reflux disease      Hernia of unspecified site of abdominal cavity without mention of obstruction or  gangrene     bilateral     HTN (hypertension)      Hypertriglyceridemia      Obstructive sleep apnea      ANNETTE (obstructive sleep apnea)      Papillary carcinoma of thyroid (H)     s/p thyroidectomy - Ruegemer     PE (pulmonary embolism)      Poliomyelitis     poor circulation right leg     Postsurgical hypothyroidism     s/p papillary thryoid cancer - Ruegemer     Pulmonary embolism (H)      Rosacea      S/P carpal tunnel release     bilateral     S/P hardware removal 01/2014    still with lingering foot pain     S/P shoulder surgery     bilateral     Septic joint (H)     right knee     Venous insufficiency      Venous thrombosis 1999    right axillary vein         Medication Adherence:  N/A - Client does not have prescribed psychiatric medications.    Client was provided recommendation to follow-up with prescribing physician.    Mental Status Assessment:  Appearance:   Appropriate   Eye Contact:   Good   Psychomotor Behavior: Normal  Slowed   Attitude:   Cooperative   Orientation:   All  Speech   Rate / Production: Normal    Volume:  Normal   Mood:    Normal  Affect:    Appropriate  Worrisome   Thought Content:  Clear   Thought Form:  Coherent  Goal Directed  Logical   Insight:    Good       Review of Symptoms:  Depression: Energy Irritability Down  Yolanda:  No symptoms  Psychosis: No symptoms  Anxiety: Worries Unusual Triggers: 's Parkinson's diagnosis   Panic:  No symptoms  Post Traumatic Stress Disorder: Trauma  Obsessive Compulsive Disorder: No symptoms  Eating Disorder: No symptoms  Oppositional Defiant Disorder: No symptoms  ADD / ADHD: No symptoms  Conduct Disorder: No symptoms      Safety Assessment:    History of Safety Concerns:   Client denied a history of suicidal ideation.    Client denied a history of suicide attempts.    Client denied a history of homicidal ideation.    Client denied a history of self-injurious ideation and behaviors.    Client denied a history of personal safety concerns.   "  Client denied a history of assaultive behaviors.        Current Safety Concerns:  Client denies current suicidal ideation.    Client denies current homicidal ideation and behaviors.  Client denies current self-injurious ideation and behaviors.    Client denies current concerns for personal safety.    Client reports the following protective factors: positive relationships positive social network, sober network and positive family connections, forward/future oriented thinking, restricted access to lethal means no access to firearms, dedication to family/friends, safe and stable environment, regular sleep, effectively controls impulses, sense of belonging family, work, purpose family, work, friends, secure attachment, help seeking behaviors when distressed PCP, therapy, abstinence from substances, adherence with prescribed medication, living with other people, daily obligations, structured day, effective problem-solving skills, committment to well-being, sense of meaning, positive social skills, healthy fear of risky behaviors or pain, financial stability, strong sense of self-worth/esteem, sense of personal control or determination and access to a variety of clinical interventions    Client reports there are no firearms in the house.     Plan for Safety and Risk Management:  A safety and risk management plan has not been developed at this time, however client was given the after-hours number / 911 should there be a change in any of these risk factors.    Client's Strengths and Limitations:  Client identified the following strengths or resources that will help her succeed in counseling: commitment to health and well being, family support and intelligence. Client identified the following supports: family and friends. Things that may interfere with the client's success in counseling include: \"Nothing\".      Diagnostic Criteria:  A. The development of emotional or behavioral symptoms in response to an identifiable " stressor(s) occurring within 3 months of the onset of the stressor(s)  B. These symptoms or behaviors are clinically significant, as evidenced by one or both of the following:       - Significant impairment in social, occupational, or other important areas of functioning  C. The stress-related disturbance does not meet criteria for another disorder & is not not an exacerbation of another mental disorder  D. The symptoms do not represent normal bereavement  E. Once the stressor or its consequences have terminated, the symptoms do not persist for more than an additional 6 months       * Adjustment Disorder with Anxiety: The predominant manfestations are symptoms such as nervousness, worry, or jitteriness, or, in children separation anxiety from major attachment figures      Functional Status:  Client's symptoms are causing reduced functional status in the following areas: management of the household and or completion of tasks, relationship(s) and self-care      DSM5 Diagnoses: (Sustained by DSM5 Criteria Listed Above)  Diagnoses: Adjustment Disorders  309.24 (F43.22) With anxiety  Psychosocial & Contextual Factors: Client is experiencing symptoms of anxiety related to feeling overwhelmed by life changes following her 's diagnosis of Parkinson's disease.   WHODAS 2.0 (12 item) 18.75% on 9/19/2018            This questionnaire asks about difficulties due to health conditions. Health conditions  include  disease or illnesses, other health problems that may be short or long lasting,  injuries, mental health or emotional problems, and problems with alcohol or drugs.                     Think back over the past 30 days and answer these questions, thinking about how much  difficulty you had doing the following activities. For each question, please [place] only  one response.    S1 Standing for long periods such as 30 minutes? Severe =       4   S2 Taking care of household responsibilities? Mild =           2   S3  Learning a new task, for example, learning how to get to a new place? None =         1   S4 How much of a problem do you have joining community activities (for example, festivals, Yazdanism or other activities) in the same way as anyone else can? None =         1   S5 How much have you been emotionally affected by your health problems? Mild =           2     In the past 30 days, how much difficulty did you have in:   S6 Concentrating on doing something for ten minutes? None =         1   S7 Walking a long distance such as a kilometer (or equivalent)? Severe =       4   S8 Washing your whole body? None =         1   S9 Getting dressed? None =         1   S10 Dealing with people you do not know? None =         1   S11 Maintaining a friendship? None =         1   S12 Your day to day work? Mild =           2     H1 Overall, in the past 30 days, how many days were these difficulties present? Record number of days 2   H2 In the past 30 days, for how many days were you totally unable to carry out your usual activities or work because of any health condition? Record number of days  2   H3 In the past 30 days, not counting the days that you were totally unable, for how many days did you cut back or reduce your usual activities or work because of any health condition? Record number of days 15     Attendance Agreement:  Client has signed Attendance Agreement:Yes      Collaboration:  The client is receiving treatment / structured support from the following professional(s) / service and treatment. Collaboration will be initiated with: primary care physician.      Preliminary Treatment Plan:  The client reports no currently identified Yazdanism, ethnic or cultural issues relevant to therapy.     services are not indicated.    Modifications to assist communication are not indicated.    The concerns identified by the client will be addressed in therapy.    Initial Treatment will focus on: Adjustment Difficulties related  to: family concerns and 's health  Relational Problems related to: Conflict or difficulties with partner/spouse.    As a preliminary treatment goal, client will develop coping/problem-solving skills to facilitate more adaptive adjustment and will address relationship difficulties in a more adaptive manner.    The focus of initial interventions will be to alleviate anxiety, facilitate appropriate expression of feelings, increase coping skills, provide psychoeduction regarding anxiety, teach CBT skills, teach mindfulness skills, teach problem-solving skills and teach stress mangement techniques.    Referral to another professional/service is not indicated at this time.    A Release of Information is not needed at this time.    Report to child / adult protection services was NA.    Client will have access to their [facility]' medical record.    CELIA Rodrigues, LICSW  September 19, 2018

## 2018-09-19 NOTE — PROGRESS NOTES
[facility]    Date of visit: 9/20/2018    Chief complaint:   Chief Complaint   Patient presents with     Pain       Interval history:  Chasity Hodgson is a 72 year old female last seen by me on 8/22/18.      Recommendations/plan at the last visit included:              1.  Pain Physical Therapy:  YES   Schedule first visit with JOEY Kaba. Try to have 2-3 sessions prior to next visit.                        2.  Pain Psychologist to address relaxation, behavioral change, coping style, and other factors important to improvement.  YES   We will focus on pain PT for now. Would recommend in the future.                         3.  Medication Management:                                               1. Sammie describes muscle spasm pain, reasonable to try a muscle relaxant. We will try Robaxin for muscle spasm, take 1-2 tablets up to three times daily as needed.  Continue Biofreeze and gabapentin for now. As Sammie often takes 4000mg of Tylenol in 24 hours, recommended she try taking no more than 3000mg when able.                         4.  Advised she consider chiropractic care or acupuncture, can discuss at next visit.                         5.  Patient to follow up with Primary Care provider regarding elevated blood pressure. She has not taken her BP medication yet this morning.                         6.  Follow up with MAGALY Dan CNP in 4 weeks.     Since her last visit, Chasity Hodgson reports:  -Her pain is somewhat better than it was at last visit.   -She finds repetitive movement aggravates her pain the most, most notable while at work. She takes Tylenol PM and uses an ice pack in the evening with good benefit.  -She has had x1 visit with JOEY Kaba. She states this went well, she was told to not return until she completes physical therapy for her shoulder.  -She continues to go through [facility] for her shoulder every 3 weeks, isn't sure when she will have completed the  "sessions. She continues to practice physical therapy exercises at home on a regular basis, does gentle exercises on days that she works.   -She has follow up scheduled with Dr. Ferrera (orthopedic surgeon) on 10/10.   -She had x1 session with psychologist Kavon Pabon as recommended by her primary care provider.  She states this went wonderfully, plans to continue working with him.   -She has been using salonpas patches on her left knee, has good benefit within 5 minutes of application, \"I love it.\" She is trying to hold off on injections for now.   -She wonders about applying salonpas patches on her left shoulder.   -She tried Robaxin for muscle spasm, didn't find this helpful. She also noted that she had vivid dreams with this so stopped taking.       Pain Information:   Pain quality: Aching and Throbbing    Pain timing: Constant     Pain rating: intensity ranges from 2/10 to 9/10, and averages 5/10 on a 0-10 scale.   Aggravating factors include: repetitive use, reaching   Relieving factors include: cold packs, Tylenol    Current pain treatments:    Robaxin 500-1000mg TID prn- NH, SE, vivid dreams   Tylenol 500mg- H after an hour, 2 tabs BID or TID   Biofreeze- H for knee pain after an hour   Salonpas- H!   Gabapentin 100mg at bedtime (has been on higher doses with dizziness)- ? for sleep or pain    Current MME: 0    Annual Controlled Substance Agreement/UDS due date: N/A    Past pain treatments:  1. Previous Pain Relevant Medications:  NOTE: This medication information taken from patient's intake form, not medical records.                         Opiates: oxycodone- H                        NSAIDS: cannot take due to abnormal kidney function                        Muscle Relaxants: Flexeril- H, Robaxin- NH, SE, vivid dreams                        Anti-migraine mediations: no                        Anti-depressants: amitriptyline- ?                         Sleep aids: Ambien- SE, hang over                        " Anxiolytics: no                        Neuropathics: gabapentin- ?                                    Topicals: Biofreeze- H for knee pain                        Other medications not covered above: Tylenol- H      2. Physical Therapy: completed physical therapy for right shoulder  3. Pain Psychology: no, meeting with a psychologist Kavon Pabon  4. Surgery: right shoulder replacement 4/4/18 Adam Ferrera  5. Injections: left knee steroid injections and rooster [facility] last in 8/13/18  6. Chiropractic: no  7. Acupuncture: no  8. TENS Unit: no    Medications:  Current Outpatient Prescriptions   Medication Sig Dispense Refill     ascorbic acid (VITAMIN C) 1000 MG TABS Take 1,000 mg by mouth daily       aspirin (SB LOW DOSE ASA EC) 81 MG EC tablet Take 81 mg by mouth daily       atenolol (TENORMIN) 50 MG tablet Take 1 tablet (50 mg) by mouth 2 times daily 180 tablet 3     Azelastine HCl (ASTEPRO NA)        BIOTIN PO Take 1,000 mcg by mouth       blood glucose monitoring (NO BRAND SPECIFIED) test strip Use to test blood sugar 1 times daily or as directed. 1 Box 6     Cholecalciferol (VITAMIN D-3 PO) Take 2 tablets by mouth       clotrimazole (LOTRIMIN) 1 % cream Apply topically daily       cycloSPORINE (RESTASIS) 0.05 % ophthalmic emulsion 1 drop 2 times daily       diphenhydrAMINE-acetaminophen (TYLENOL PM)  MG tablet Take 1 tablet by mouth At Bedtime Reported on 3/20/2017       famotidine (PEPCID) 40 MG tablet Take 40 mg by mouth At Bedtime       fenofibrate 145 MG tablet Take 1 tablet (145 mg) by mouth daily 90 tablet 1     ferrous sulfate (IRON) 325 (65 FE) MG tablet Take 325 mg by mouth daily (with breakfast)       fexofenadine (ALLEGRA) 180 MG tablet Take 180 mg by mouth daily. 120 0     fluticasone (FLONASE) 50 MCG/ACT spray Spray 2 sprays in nostril daily 2 sprays in each nostril qd 1 Bottle 0     gabapentin (NEURONTIN) 100 MG capsule Take 1 capsule (100 mg) by mouth every evening as  needed 90 capsule 3     hydrochlorothiazide (HYDRODIURIL) 25 MG tablet Take 1 tablet (25 mg) by mouth daily 90 tablet 3     insulin glargine U-300 (TOUJEO SOLOSTAR) 300 UNIT/ML injection Inject 34 Units Subcutaneous every morning        levothyroxine (SYNTHROID) 112 MCG tablet Take 112 mcg by mouth daily Take 112 mcg daily except for Friday takes an additional 56 mcg.  Brand name Synthroid       lisinopril (PRINIVIL/ZESTRIL) 20 MG tablet Take 1 tablet (20 mg) by mouth daily 90 tablet 2     methocarbamol (ROBAXIN) 500 MG tablet Take 1-2 tablets (500-1,000 mg) by mouth 3 times daily as needed for muscle spasms (Patient not taking: Reported on 9/10/2018) 120 tablet 0     metoclopramide (REGLAN) 10 MG tablet Take 1-2 tabs as needed       MULTIVITAMINS OR TABS ONE DAILY 100 3     nitroGLYcerin (NITROSTAT) 0.4 MG sublingual tablet Place 1 tablet (0.4 mg) under the tongue every 5 minutes as needed for chest pain (no more than 3 in one hour; after 3rd, call 911.) 25 tablet 3     nystatin (MYCOSTATIN) cream Apply topically daily as needed        nystatin-triamcinolone (MYCOLOG II) cream Apply topically daily as needed       ondansetron (ZOFRAN) 4 MG tablet Take 1 tablet by mouth every 6 hours as needed Reported on 3/20/2017       order for DME Equipment being ordered: Compression stockings - Knee High; 20-30 mmHg compression 2 each 0     order for DME Donut Pillow 1 each 0     order for DME Equipment being ordered: Oral appliance for sleep apnea 1 Units 0     pantoprazole (PROTONIX) 40 MG EC tablet Take 40 mg by mouth 2 times daily       sitagliptin (JANUVIA) 50 MG tablet Take 50 mg by mouth daily       triamcinolone (KENALOG) 0.1 % cream Apply topically daily         Medical History: any changes in medical history since they were last seen? Yes- GI virus    Review of Systems:  The 14 system ROS was reviewed from the intake questionnaire, and is positive for: fever, chills (GI virus), allergies, abdominal pain, nausea,  "vomiting, diarrhea, diabetes, thyroid disease, arthritis  Any bowel or bladder problems: currently denies  Mood: \"its been good\"     Physical Exam:  Blood pressure 156/62, pulse 64, SpO2 96 %, not currently breastfeeding.  General: A&O x4, no signs of distress. Obese.   Gait: Slow.   MSK exam: 5/5 upper and lower extremity strength.    Labs:  CRP was completed on 8/1/18 and shows:  CRP Inflammation 7.2   0.0 - 8.0 mg/L Final 08/01/2018 11:52 AM 51      Sed rate was completed on 8/1/18 and shows:  Sed Rate 15   0 - 30 mm/h Final 08/01/2018 11:52 AM 54      Lyme antibody was completed on 8/1/18 and shows:  Lyme Disease Antibodies Serum <0.01   0.00 - 0.89   Final 08/01/2018 11:52 AM 51      CK was completed on 8/1/18 and shows:  CK Total 104   30 - 225 U/L Final 08/01/2018 11:52 AM 65       Assessment:   Chasity Hodgson is a 72 year old female with a past medical history significant for poliomyelitis, allergic rhinitis, ANNETTE, gastroparesis, esophageal reflux, gastroparesis, morbid obesity, hyperlipidemia, hypothyroidism, type 2 diabetes mellitus, anemia, DVT, and fibromyalgia who presents with complaints of bilateral arm and left leg pain.      1. Bilateral arm pain and left leg pain- etiology may be related to fibromyalgia.  2. Mental Health - the patient's mental health concerns affect her experience of pain and contribute to her clinically significant distress.     1. Myalgia    2. Fibromyalgia      Plan:         1.  Physical Therapy:  YES   I advised Sammie to go to remaining sessions with [facility]. Would recommend follow up sessions with JOEY Kaba once this is completed.    2.  Pain Psychologist to address relaxation, behavioral change, coping style, and other factors important to improvement.  YES   Could consider in the future, for now recommend she focus on visits with Kavon Pabon.    3.  Medication Management:     1. We discussed an increase in gabapentin vs. starting Cymbalta for pain next. As " she is already taking gabapentin, she would prefer to first increase in gabapentin (of note, she has tried higher doses in the past with some issues but would still like to try). For 1-2 weeks, try taking 200mg of gabapentin at bedtime. If she tolerates this well, would recommend 100mg morning dose as well. Call with issues.    2.  If gabapentin not helpful/side effects are too bothersome, would recommend Cymbalta.     3. Continue using Salonpas patches for knee pain, okay to use on shoulder as well.   4.  Potential procedures: not at this time.     5.  Go to follow up with Dr. Ferrera (orthopedic surgeon) as scheduled.    6.  Follow up with MAGALY Dan CNP in 4-6 weeks.    7.  Patient to follow up with Primary Care provider regarding elevated blood pressure. She has not taken her BP medication yet today.         I spent 30 minutes of time face to face with the patient.  Greater than 50% of this time was spent in patient counseling and/or coordination of care.    Stephanie MCKENZIE CNP  [facility]

## 2018-09-19 NOTE — MR AVS SNAPSHOT
MRN:[id number]                      After Visit Summary   9/19/2018    Chasity Hodgson    MRN: [id number]           Visit Information        Provider Department      9/19/2018 10:00 AM Pepper Kavon, [facility] Generic      Your next 10 appointments already scheduled     Sep 20, 2018 10:00 AM CDT   Return Visit with MAGALY Carlisle CNP   [facility] ([facility])    [address]  [address]  [place] [address]   [phone]            Sep 26, 2018  9:00 AM CDT   Return Visit with Kavon Pabon [facility] ([facility])    [address] [address] [phone]            Oct 09, 2018  9:30 AM CDT   Return Visit with Kavon Pabon [facility] ([facility])    [address] [address] [phone]            Oct 23, 2018 10:30 AM CDT   Return Visit with Kavon Pabon [facility] ([facility])    [address] [address] [phone]              MyChart Information     [employer] gives you secure access to your electronic health record. If you see a primary care provider, you can also send messages to your care team and make appointments. If you have questions, please call your primary care clinic.  If you do not have a primary care provider, please call [phone] and they will assist you.        Care EveryWhere ID     This is your Care EveryWhere ID. This could be used by other organizations to access your [place] medical records  [id number]        Equal Access to Services     CORINNE ROBB : Hadii domi Olea, waaxda luqadaha, qaybta kaalmada adejosé miguelyada, jose kim. So [facility] [phone].    ATENCIÓN: Si habla español, tiene a modi disposición servicios gratuitos de asistencia lingüística. Llame al  [phone].    We comply with applicable federal civil rights laws and [place] laws. We do not discriminate on the basis of race, color, national origin, age, disability, sex, sexual orientation, or gender identity.

## 2018-09-19 NOTE — Clinical Note
Hi,  Pt attended her intake therapy session at [facility] on 9/19/18. Dx of Adjustment Disorder with Anxiety. Her next scheduled session is for 9/26/18.  Will collaborate throughout treatment as needed. Regards, Kavon Pabon MA, LMFT, LICSW

## 2018-09-20 ENCOUNTER — OFFICE VISIT (OUTPATIENT)
Dept: PALLIATIVE MEDICINE | Facility: CLINIC | Age: 73
End: 2018-09-20
Payer: MEDICARE

## 2018-09-20 VITALS — HEART RATE: 64 BPM | DIASTOLIC BLOOD PRESSURE: 62 MMHG | OXYGEN SATURATION: 96 % | SYSTOLIC BLOOD PRESSURE: 156 MMHG

## 2018-09-20 DIAGNOSIS — M79.10 MYALGIA: Primary | ICD-10-CM

## 2018-09-20 DIAGNOSIS — M79.7 FIBROMYALGIA: ICD-10-CM

## 2018-09-20 PROCEDURE — 99214 OFFICE O/P EST MOD 30 MIN: CPT | Performed by: NURSE PRACTITIONER

## 2018-09-20 ASSESSMENT — PATIENT HEALTH QUESTIONNAIRE - PHQ9: SUM OF ALL RESPONSES TO PHQ QUESTIONS 1-9: 1

## 2018-09-20 ASSESSMENT — PAIN SCALES - GENERAL: PAINLEVEL: MODERATE PAIN (4)

## 2018-09-20 ASSESSMENT — ANXIETY QUESTIONNAIRES: GAD7 TOTAL SCORE: 1

## 2018-09-20 NOTE — PATIENT INSTRUCTIONS
1.  Physical Therapy:  YES   Go to last sessions with [facility] therapy. Would recommend follow up session with JOEY Kaba once this is completed.    2.  Pain Psychologist to address relaxation, behavioral change, coping style, and other factors important to improvement.  YES   Could consider in the future, go to visits with Kavon Pabon.    3.  Medication Management:     1. Lets try an increase in gabapentin next to determine if helpful for pain. For 1-2 weeks, try taking 200mg of gabapentin at bedtime. If you tolerate this well, could add a morning dose as well. Call with issues.    2. Could consider Cymbalta next if not helpful/side effects.      3. Continue using Salonpas patches for knee pain, okay to use on shoulder as well.    4.  Potential procedures: not at this time.     5.  Go to follow up with Dr. Ferrera as scheduled.    6.  Follow up with MAGALY Dan CNP in 4-6 weeks.     ----------------------------------------------------------------  Clinic Number:  [phone]   Call this number with any questions about your care and for scheduling assistance. Calls are returned Monday through Friday between 8 AM and 4:30 PM. We usually get back to you within 2 business days depending on the issue/request.       Medication refills:    For non-narcotic medications, call your pharmacy directly to request a refill. The pharmacy will contact the [facility] for authorization. Please allow 3-4 days for these refills to be processed.     For narcotic refills, call the clinic number or send a [employer] message. Please contact us 7-10 days before your refill is due. The message MUST include the name of the specific medication(s) requested and how you would like to receive the prescription(s). The options are as follows:    Pain Clinic staff can mail the prescription to your pharmacy. Please tell us the name of the pharmacy.    You may pick the prescription up at the Pain Clinic (tell us the  location) or during a clinic visit with your pain provider    Pain Clinic staff can deliver the prescription to the [facility] pharmacy in the clinic building. Please tell us the location.      We believe regular attendance is key to your success in our program.    Any time you are unable to keep your appointment we ask that you call us at least 24 hours in advance to let us know. This will allow us to offer the appointment time to another patient.

## 2018-09-20 NOTE — MR AVS SNAPSHOT
After Visit Summary   9/20/2018    Chasity Hodgson    MRN: [id number]           Patient Information     Date Of Birth          1945        Visit Information        Provider Department      9/20/2018 10:00 AM Abbi Spence APRN CNP [facility]        Care Instructions     1.  Physical Therapy:  YES   Go to last sessions with [facility]. Would recommend follow up session with JOEY Kaba once this is completed.    2.  Pain Psychologist to address relaxation, behavioral change, coping style, and other factors important to improvement.  YES   Could consider in the future, go to visits with Kavon Pabon.    3.  Medication Management:     1. Lets try an increase in gabapentin next to determine if helpful for pain. For 1-2 weeks, try taking 200mg of gabapentin at bedtime. If you tolerate this well, could add a morning dose as well. Call with issues.    2. Could consider Cymbalta next if not helpful/side effects.     4.  Potential procedures: not at this time.     5.  Go to follow up with Dr. Ferrera as scheduled.    6.  Follow up with MAGALY Dan CNP in 4-6 weeks.     ----------------------------------------------------------------  Clinic Number:  [phone]   Call this number with any questions about your care and for scheduling assistance. Calls are returned Monday through Friday between 8 AM and 4:30 PM. We usually get back to you within 2 business days depending on the issue/request.       Medication refills:    For non-narcotic medications, call your pharmacy directly to request a refill. The pharmacy will contact the [facility] for authorization. Please allow 3-4 days for these refills to be processed.     For narcotic refills, call the clinic number or send a ClickSquared message. Please contact us 7-10 days before your refill is due. The message MUST include the name of the specific medication(s) requested and how you would like to receive the  prescription(s). The options are as follows:    Pain Clinic staff can mail the prescription to your pharmacy. Please tell us the name of the pharmacy.    You may pick the prescription up at the Pain Clinic (tell us the location) or during a clinic visit with your pain provider    Pain Clinic staff can deliver the prescription to the [place] pharmacy in the clinic building. Please tell us the location.      We believe regular attendance is key to your success in our program.    Any time you are unable to keep your appointment we ask that you call us at least 24 hours in advance to let us know. This will allow us to offer the appointment time to another patient.                 Follow-ups after your visit        Your next 10 appointments already scheduled     Sep 26, 2018  9:00 AM CDT   Return Visit with Kavon Pabon [facility]    [address] [facility] [address] [phone]            Oct 09, 2018  9:30 AM CDT   Return Visit with Kavon Pabon [facility]    [address] [facility] [address] [address] [phone]            Oct 23, 2018 10:30 AM CDT   Return Visit with Kavon Pabon [facility]    [address] [address] [phone]              Who to contact     If you have questions or need follow up information about today's clinic visit or your schedule please contact [facility] directly at [phone].  Normal or non-critical lab and imaging results will be communicated to you by MyChart, letter or phone within 4 business days after the clinic has received the results. If you do not hear from us within 7 days, please contact the clinic through MyChart or phone. If you have a critical or abnormal lab result, we will notify you by phone as soon as possible.  Submit refill requests through Ourpalmhart or call your  pharmacy and they will forward the refill request to us. Please allow 3 business days for your refill to be completed.          Additional Information About Your Visit        FreeBriehart Information     FreeBriehart gives you secure access to your electronic health record. If you see a primary care provider, you can also send messages to your care team and make appointments. If you have questions, please call your primary care clinic.  If you do not have a primary care provider, please call [phone] and they will assist you.        Care EveryWhere ID     This is your Care EveryWhere ID. This could be used by other organizations to access your [facility] medical records  [id number]        Your Vitals Were     Pulse Pulse Oximetry                64 96%           Blood Pressure from Last 3 Encounters:   09/20/18 156/62   08/22/18 152/72   08/14/18 173/72    Weight from Last 3 Encounters:   08/22/18 93.4 kg (206 lb)   08/14/18 93.4 kg (206 lb)   08/01/18 91.6 kg (202 lb)              Today, you had the following     No orders found for display       Primary Care Provider Office Phone # Fax #    Shola Benoit [phone] [address]  [place] [address]        Equal Access to Services     CORINNE ROBB : Hadii aad ku hadasho Soomaali, waaxda luqadaha, qaybta kaalmada adeegyada, jose baldwin . So [facility] [phone].    ATENCIÓN: Si habla español, tiene a modi disposición servicios gratuitos de asistencia lingüística. Llame al [phone].    We comply with applicable federal civil rights laws and [place] laws. We do not discriminate on the basis of race, color, national origin, age, disability, sex, sexual orientation, or gender identity.            Thank you!     Thank you for choosing [facility]  for your care. Our goal is always to provide you with excellent care. Hearing back from our patients is one way we can continue to improve our  services. Please take a few minutes to complete the written survey that you may receive in the mail after your visit with us. Thank you!             Your Updated Medication List - Protect others around you: Learn how to safely use, store and throw away your medicines at www.disposemymeds.org.          This list is accurate as of 9/20/18 10:29 AM.  Always use your most recent med list.                   Brand Name Dispense Instructions for use Diagnosis    ascorbic acid 1000 MG Tabs    vitamin C     Take 1,000 mg by mouth daily        ASTEPRO NA           atenolol 50 MG tablet    TENORMIN    180 tablet    Take 1 tablet (50 mg) by mouth 2 times daily    Essential hypertension       BIOTIN PO      Take 1,000 mcg by mouth        blood glucose monitoring test strip    no brand specified    1 Box    Use to test blood sugar 1 times daily or as directed.    Type 2 diabetes mellitus with other specified complication (H)       clotrimazole 1 % cream    LOTRIMIN     Apply topically daily        cycloSPORINE 0.05 % ophthalmic emulsion    RESTASIS     1 drop 2 times daily        diphenhydrAMINE-acetaminophen  MG tablet    TYLENOL PM     Take 1 tablet by mouth At Bedtime Reported on 3/20/2017        famotidine 40 MG tablet    PEPCID     Take 40 mg by mouth At Bedtime        fenofibrate 145 MG tablet     90 tablet    Take 1 tablet (145 mg) by mouth daily    CARDIOVASCULAR SCREENING; LDL GOAL LESS THAN 130       ferrous sulfate 325 (65 Fe) MG tablet    IRON     Take 325 mg by mouth daily (with breakfast)        fexofenadine 180 MG tablet    ALLEGRA    120    Take 180 mg by mouth daily.        fluticasone 50 MCG/ACT spray    FLONASE    1 Bottle    Spray 2 sprays in nostril daily 2 sprays in each nostril qd    Seasonal allergic rhinitis due to other allergic trigger       gabapentin 100 MG capsule    NEURONTIN    90 capsule    Take 1 capsule (100 mg) by mouth every evening as needed    Poliomyelitis       hydrochlorothiazide 25  MG tablet    HYDRODIURIL    90 tablet    Take 1 tablet (25 mg) by mouth daily    Essential hypertension       lisinopril 20 MG tablet    PRINIVIL/ZESTRIL    90 tablet    Take 1 tablet (20 mg) by mouth daily    Essential hypertension       methocarbamol 500 MG tablet    ROBAXIN    120 tablet    Take 1-2 tablets (500-1,000 mg) by mouth 3 times daily as needed for muscle spasms    Myalgia       metoclopramide 10 MG tablet    REGLAN     Take 1-2 tabs as needed        multivitamin per tablet     100    ONE DAILY        nitroGLYcerin 0.4 MG sublingual tablet    NITROSTAT    25 tablet    Place 1 tablet (0.4 mg) under the tongue every 5 minutes as needed for chest pain (no more than 3 in one hour; after 3rd, call 911.)    Atypical chest pain       nystatin cream    MYCOSTATIN     Apply topically daily as needed        nystatin-triamcinolone cream    MYCOLOG II     Apply topically daily as needed        ondansetron 4 MG tablet    ZOFRAN     Take 1 tablet by mouth every 6 hours as needed Reported on 3/20/2017        order for DME     1 Units    Equipment being ordered: Oral appliance for sleep apnea    ANNETTE (obstructive sleep apnea)       order for DME     2 each    Equipment being ordered: Compression stockings - Knee High; 20-30 mmHg compression    Insufficiency, arterial, peripheral (H)       order for DME     1 each    Donut Pillow    Coccydynia       pantoprazole 40 MG EC tablet    PROTONIX     Take 40 mg by mouth 2 times daily        SB LOW DOSE ASA EC 81 MG EC tablet   Generic drug:  aspirin      Take 81 mg by mouth daily        sitagliptin 50 MG tablet    JANUVIA     Take 50 mg by mouth daily        SYNTHROID 112 MCG tablet   Generic drug:  levothyroxine      Take 112 mcg by mouth daily Take 112 mcg daily except for Friday takes an additional 56 mcg.  Brand name Synthroid        TOUJEO SOLOSTAR 300 UNIT/ML injection   Generic drug:  insulin glargine U-300      Inject 34 Units Subcutaneous every morning         triamcinolone 0.1 % cream    KENALOG     Apply topically daily        VITAMIN D-3 PO      Take 2 tablets by mouth

## 2018-09-26 ENCOUNTER — OFFICE VISIT (OUTPATIENT)
Dept: PSYCHOLOGY | Facility: CLINIC | Age: 73
End: 2018-09-26
Attending: INTERNAL MEDICINE
Payer: MEDICARE

## 2018-09-26 DIAGNOSIS — F43.22 ADJUSTMENT DISORDER WITH ANXIETY: Primary | ICD-10-CM

## 2018-09-26 PROCEDURE — 90834 PSYTX W PT 45 MINUTES: CPT | Performed by: MARRIAGE & FAMILY THERAPIST

## 2018-09-26 NOTE — MR AVS SNAPSHOT
MRN:[id number]                      After Visit Summary   9/26/2018    Chasity Hodgson    MRN: [id number]           Visit Information        Provider Department      9/26/2018 9:00 AM Pepper Kavon SARITHA [facility]      Your next 10 appointments already scheduled     Oct 09, 2018  9:30 AM CDT   Return Visit with Kavon Pabon [facility]    [address] [address]   [phone]            Oct 23, 2018 10:30 AM CDT   Return Visit with Kavon Pabon [facility]    [address] [address] [phone]            Oct 25, 2018  1:00 PM CDT   Return Visit with MAGALY [facility] ([facility])    [address] [address] [address]   [phone]              MyChart Information     MySongToYou gives you secure access to your electronic health record. If you see a primary care provider, you can also send messages to your care team and make appointments. If you have questions, please call your primary care clinic.  If you do not have a primary care provider, please call [phone] and they will assist you.        Care EveryWhere ID     This is your Care EveryWhere ID. This could be used by other organizations to access your [place] medical records  [id number]        Equal Access to Services     CORINNE ROBB AH: Hadii domi milton Sobhavna, waaxda luqadaha, qaybta kaalmada adeegyada, jose kim. So [facility] [phone].    ATENCIÓN: Si habla español, tiene a modi disposición servicios gratuitos de asistencia lingüística. Llame al [phone].    We comply with applicable federal civil rights laws and [place] laws. We do not discriminate on the basis of race, color, national origin, age, disability, sex, sexual orientation,  or gender identity.

## 2018-09-26 NOTE — PROGRESS NOTES
Progress Note    Client Name: Chasity Hodgson  Date: 9/26/2018         Service Type: Individual      Session Start Time: 9am  Session End Time: 9:45am      Session Length: 45 minutes     Session #: 2     Attendees: Client attended alone    Treatment Plan Last Reviewed: developing Tx plan  PHQ-9 / JAYLEN-7 : 9/19/2018     DATA      Progress Since Last Session (Related to Symptoms / Goals / Homework):   Symptoms: No change 2nd session    Homework: Achieved / completed to satisfaction      Episode of Care Goals: Satisfactory progress - PREPARATION (Decided to change - considering how); Intervened by negotiating a change plan and determining options / strategies for behavior change, identifying triggers, exploring social supports, and working towards setting a date to begin behavior change     Current / Ongoing Stressors and Concerns:   - Sx of anxiety 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 She has noticed that things have progressed since his initial visit 6 months ago. She wants his doctor to be made aware of what is going on and wants her  to be informed of what he will experience. The client will gather input from their children and provide that information to her 's doctor prior to their visit.        Treatment Objective(s) Addressed in This Session:   identify 2 stressors which contribute to feelings of anxiety  use at least 1 coping skills for anxiety management in the next 2 weeks       Intervention:   CBT: identify patterns and alternatives for desired change  Motivational Interviewing: what can she do in the present to reach an attainable goal  Structural: work to establish a family structure that fits their needs with her  's medical problems        ASSESSMENT: Current Emotional / Mental Status (status of significant symptoms):   Risk status (Self / Other harm or suicidal ideation)   Client denies current fears or concerns for personal safety.   Client denies current or recent suicidal ideation or behaviors.   Client denies current or recent homicidal ideation or behaviors.   Client denies current or recent self injurious behavior or ideation.   Client denies other safety concerns.   Client Client reports there has been no change in risk factors since their last session.     Client Client reports there has been no change in protective factors since their last session.     A safety and risk management plan has not been developed at this time, however client was given the after-hours number / 911 should there be a change in any of these risk factors.     Appearance:   Appropriate    Eye Contact:   Good    Psychomotor Behavior: Normal    Attitude:   Cooperative    Orientation:   All   Speech    Rate / Production: Normal     Volume:  Normal    Mood:    Anxious    Affect:    Appropriate  Worrisome    Thought Content:  Clear    Thought Form:  Coherent  Goal Directed  Logical    Insight:    Fair      Medication Review:   No current psychiatric medications prescribed     Medication Compliance:   NA     Changes in Health Issues:   None reported     Chemical Use Review:   Substance Use: Chemical use reviewed, no active concerns identified      Tobacco Use: No current tobacco use.       Collateral Reports Completed:   Not Applicable    PLAN: (Client Tasks / Therapist Tasks / Other)  Client and her children will write an update letter to her 's doctor about his progressed parkinson's. She will start attending a support group and start to schedule self-care time for herself.          CELIA Rodrigues, LICSW                                                      "    ________________________________________________________________________    Treatment Plan    Client's Name: Chasity Hodgson  YOB: 1945    Date: 9/26/2018    DSM-V Diagnoses: Adjustment Disorders  309.24 (F43.22) With anxiety  Psychosocial & Contextual Factors: Client is experiencing symptoms of anxiety related to feeling overwhelmed by life changes following her 's diagnosis of Parkinson's disease.   WHODAS 2.0 (12 item) 18.75% on 9/19/2018     Referral / Collaboration:  Referral to another professional/service is not indicated at this time.    Anticipated number of session or this episode of care: 6-8      MeasurableTreatment Goal(s) related to diagnosis / functional impairment(s)  Goal 1: Client will improve boundaries to help diminish symptoms of anxiety by 50% for a minimum of 4 weeks.     Learn more about Parkinson's. Take some time for my self. Get my house cleaned up and ready for the holidays.\"     Objective #A (Client Action)      Client will compile a list of boundaries that they would like to set with others: , self  Learning to fight fair, learning to identify positive and negative communication patterns           Status: New - Date: TBD     Intervention(s)  Therapist will teach about healthy boundaries. structure, saying \"no\", advocating, identifying and establishing appropriate roles, rules, expectations.    Objective #B  Client will practice setting boundaries daily times in the next 8 weeks.                    Status: New - Date: TBD    Intervention(s)  Therapist will assign homework to track the use of healthy boundaries and outcome of establishing relational change  role-play effective communication skills and conflict management    Objective #C  Client will learn & utilize at least 3 assertive communication skills weekly.  Status: New - Date: TBD    Intervention(s)  teach assertiveness skills. aggressive/passive/assertive, impulsive control, reactive vs sensitive, " exposure to uncomfortable conversation.  Therapist will role-play assertiveness skills    Objective #D  Client will use at least 4 coping skills for anxiety management in the next 10 weeks.   Status: New - Date: TBD    Intervention(s)  Therapist will assign homework worksheet to challenge anxious thoughts, values and barriers, rational counter-statements  teach emotional regulation skills. deep breathing, grounding, progressive muscle relaxation, TIP skills    Objective #E  Client will use thought-stopping strategy daily to reduce intrusive thoughts.  Status: New - Date: TBD    Intervention(s)  provide safe space to address hx of presenting problem and root cause  Teach mindfulness practices, silent observer, ACT metaphors and managing stressors  Sandtray: exercise to stage presenting problem, reflect, process and problem solve.      Client has not reviewed nor agreed to the above plan.      CELIA Rodrigues, LICSW  September 26, 2018

## 2018-10-08 ENCOUNTER — APPOINTMENT (OUTPATIENT)
Dept: CT IMAGING | Facility: CLINIC | Age: 73
End: 2018-10-08
Attending: EMERGENCY MEDICINE
Payer: MEDICARE

## 2018-10-08 ENCOUNTER — HOSPITAL ENCOUNTER (EMERGENCY)
Facility: CLINIC | Age: 73
Discharge: HOME | End: 2018-10-08
Attending: EMERGENCY MEDICINE | Admitting: EMERGENCY MEDICINE
Payer: MEDICARE

## 2018-10-08 VITALS
RESPIRATION RATE: 20 BRPM | DIASTOLIC BLOOD PRESSURE: 64 MMHG | BODY MASS INDEX: 39.27 KG/M2 | HEIGHT: 61 IN | WEIGHT: 208 LBS | SYSTOLIC BLOOD PRESSURE: 177 MMHG | HEART RATE: 66 BPM | OXYGEN SATURATION: 100 % | TEMPERATURE: 98.1 F

## 2018-10-08 DIAGNOSIS — R10.84 ABDOMINAL PAIN, GENERALIZED: ICD-10-CM

## 2018-10-08 LAB
ANION GAP SERPL CALCULATED.3IONS-SCNC: 7 MMOL/L (ref 3–14)
BASOPHILS # BLD AUTO: 0 10E9/L (ref 0–0.2)
BASOPHILS NFR BLD AUTO: 0.3 %
BUN SERPL-MCNC: 32 MG/DL (ref 7–30)
CALCIUM SERPL-MCNC: 9.1 MG/DL (ref 8.5–10.1)
CHLORIDE SERPL-SCNC: 103 MMOL/L (ref 94–109)
CO2 SERPL-SCNC: 30 MMOL/L (ref 20–32)
CREAT SERPL-MCNC: 1.38 MG/DL (ref 0.52–1.04)
DIFFERENTIAL METHOD BLD: ABNORMAL
EOSINOPHIL # BLD AUTO: 0.3 10E9/L (ref 0–0.7)
EOSINOPHIL NFR BLD AUTO: 2.9 %
ERYTHROCYTE [DISTWIDTH] IN BLOOD BY AUTOMATED COUNT: 12.3 % (ref 10–15)
GFR SERPL CREATININE-BSD FRML MDRD: 38 ML/MIN/1.7M2
GLUCOSE SERPL-MCNC: 218 MG/DL (ref 70–99)
HCT VFR BLD AUTO: 34.2 % (ref 35–47)
HGB BLD-MCNC: 11.6 G/DL (ref 11.7–15.7)
IMM GRANULOCYTES # BLD: 0 10E9/L (ref 0–0.4)
IMM GRANULOCYTES NFR BLD: 0.3 %
LYMPHOCYTES # BLD AUTO: 1.5 10E9/L (ref 0.8–5.3)
LYMPHOCYTES NFR BLD AUTO: 17.6 %
MCH RBC QN AUTO: 30.1 PG (ref 26.5–33)
MCHC RBC AUTO-ENTMCNC: 33.9 G/DL (ref 31.5–36.5)
MCV RBC AUTO: 89 FL (ref 78–100)
MONOCYTES # BLD AUTO: 0.7 10E9/L (ref 0–1.3)
MONOCYTES NFR BLD AUTO: 7.5 %
NEUTROPHILS # BLD AUTO: 6.2 10E9/L (ref 1.6–8.3)
NEUTROPHILS NFR BLD AUTO: 71.4 %
NRBC # BLD AUTO: 0 10*3/UL
NRBC BLD AUTO-RTO: 0 /100
PLATELET # BLD AUTO: 205 10E9/L (ref 150–450)
POTASSIUM SERPL-SCNC: 4 MMOL/L (ref 3.4–5.3)
RBC # BLD AUTO: 3.85 10E12/L (ref 3.8–5.2)
SODIUM SERPL-SCNC: 140 MMOL/L (ref 133–144)
WBC # BLD AUTO: 8.7 10E9/L (ref 4–11)

## 2018-10-08 PROCEDURE — 80048 BASIC METABOLIC PNL TOTAL CA: CPT | Performed by: EMERGENCY MEDICINE

## 2018-10-08 PROCEDURE — 96361 HYDRATE IV INFUSION ADD-ON: CPT

## 2018-10-08 PROCEDURE — 74176 CT ABD & PELVIS W/O CONTRAST: CPT

## 2018-10-08 PROCEDURE — 25000128 H RX IP 250 OP 636: Performed by: EMERGENCY MEDICINE

## 2018-10-08 PROCEDURE — 96374 THER/PROPH/DIAG INJ IV PUSH: CPT

## 2018-10-08 PROCEDURE — 85025 COMPLETE CBC W/AUTO DIFF WBC: CPT | Performed by: EMERGENCY MEDICINE

## 2018-10-08 PROCEDURE — 96375 TX/PRO/DX INJ NEW DRUG ADDON: CPT

## 2018-10-08 PROCEDURE — 99285 EMERGENCY DEPT VISIT HI MDM: CPT | Mod: 25

## 2018-10-08 RX ORDER — MORPHINE SULFATE 4 MG/ML
4 INJECTION, SOLUTION INTRAMUSCULAR; INTRAVENOUS
Status: COMPLETED | OUTPATIENT
Start: 2018-10-08 | End: 2018-10-08

## 2018-10-08 RX ORDER — ONDANSETRON 2 MG/ML
4 INJECTION INTRAMUSCULAR; INTRAVENOUS EVERY 30 MIN PRN
Status: DISCONTINUED | OUTPATIENT
Start: 2018-10-08 | End: 2018-10-08 | Stop reason: HOSPADM

## 2018-10-08 RX ADMIN — SODIUM CHLORIDE 1000 ML: 9 INJECTION, SOLUTION INTRAVENOUS at 17:17

## 2018-10-08 RX ADMIN — MORPHINE SULFATE 4 MG: 4 INJECTION INTRAVENOUS at 17:12

## 2018-10-08 RX ADMIN — ONDANSETRON HYDROCHLORIDE 4 MG: 2 SOLUTION INTRAMUSCULAR; INTRAVENOUS at 17:12

## 2018-10-08 ASSESSMENT — ENCOUNTER SYMPTOMS
CONSTIPATION: 0
NAUSEA: 1
ABDOMINAL PAIN: 1
VOMITING: 0
DYSURIA: 0
DIARRHEA: 0
FEVER: 0
CHILLS: 0

## 2018-10-08 NOTE — ED PROVIDER NOTES
"  History     Chief Complaint:  Abdominal Pain    HPI  Chasity Hodgson is a 72 year old female who presents with abdominal pain. The patient states that she felt nauseous this morning, but that nausea subsided. However, around 2:00 PM today, she noticed severe abdominal contraction-like cramping which last a few minutes, and is non localized in the abdomen. She states that she sometimes hears a \"gurgle\" in her stomach near the end of her pains, but denies any current nausea or other abdominal symptoms other than the cramping. The patient did state that she once had a small bowel obstruction in 1989 and said this pain feels similar. The patient currently has a colostomy bag due to bowel incontinence, which she has had for several years. The patient denies any urinary symptoms or any other associated symptoms.      Allergies:  Nsaids  Toradol  Celecoxib  Codeine  Crestor  Vioxx  Conjugated Estrogens  Sulfa drugs    Medications:    Vitamin C  Aspirin  Tenormin  Biotin  Astepro  Vitamin D-3  Restasis  Pepcid  Fenofibrate  Iron  Allegra  Flonase  Neurontin  Hydrodiuril  Toujeo Solostar  Synthroid  Lisinopril  Reglan  Nitrostat  Protonix  Januvia    Past Medical History:    Abdominal adhesions  Allergic rhinitis  Carotid stenosis  CPAP  Diabetic gastroparesis  Type 2 diabetes  DVT of axillary vein  Fibromyalgia  GERD  Hernia  HTN  Hypertriglyceridemia  Obstructive sleep apnea  ANNETTE  Papillary carcinoma of thyroid  PE  Poliomyelitis  Postsurgical hypothyroidism  Rosacea  S/P carpal tunnel release  S/P hardware removal  S/P shoulder surgery  Septic joint  Venous insufficiency  Venous thrombosis    Past Surgical History:    Amputate toes  Appendectomy  Arthrodesis foot  C freeing bowel adhesion, enterolysis  Thyroidectomy  C total abdomen hysterectomy  Cholecystectomy  Colostomy  GI surgery  Laparotomy, lysis adhesion, combined  Release tendon foot  Remove hardware foot    Family History:  " "  Arthritis  Hypertension  Cerebrovascular disease  Obesity  Heart disease  Respiratory  Diabetes  Cancer  Lipids    Social History:  Marital Status:   Smoking Status: Never  Alcohol Use: No    Review of Systems   Constitutional: Negative for chills and fever.   Gastrointestinal: Positive for abdominal pain and nausea. Negative for constipation, diarrhea and vomiting.   Genitourinary: Negative for dysuria, urgency and vaginal discharge.   All other systems reviewed and are negative.    Physical Exam     Patient Vitals for the past 24 hrs:   BP Temp Temp src Pulse Heart Rate Resp SpO2 Height Weight   10/08/18 1956 - - - - - - 100 % - -   10/08/18 1954 177/64 - - - - - - - -   10/08/18 1641 185/58 - - - - - - - -   10/08/18 1639 - 98.1  F (36.7  C) Oral 66 66 20 98 % 1.549 m (5' 1\") 94.3 kg (208 lb)     Physical Exam  General: Patient is alert and normal appearing.  HEENT: Head atraumatic    Eyes: pupils equal and reactive. Conjunctiva clear   Nares: patent   Oropharynx: no lesions, uvula midline, no palatal draping, normal voice, no trismus  Neck: Supple without lymphadenopathy, no meningismus  Chest: Heart regular rate and rhythm.   Lungs: Equal clear to auscultation with no wheeze or rales  Abdomen: Soft, generalized abdominal tenderness with no rebound or guarding., nondistended, normal bowel sounds  Back: No costovertebral angle tenderness, no midline C, T or L spine tenderness  Neuro: Grossly nonfocal, normal speech, strength equal bilaterally, CN 2-12 intact  Extremities: No deformities, equal radial and DP pulses. No clubbing, cyanosis.  No edema  Skin: Warm and dry with no rash.       Emergency Department Course     Imaging:  Radiographic findings were communicated with the patient who voiced understanding of the findings.    CT Abdomen Pelvis w/o Contrast:  No acute abnormality in the abdomen or pelvis. No cause  for abdominal pain is identified.  Per Radiologist.     Laboratory:  CBC: WNL. (WBC 8.7, " HGB 11.6 (L), )   BMP: Glucose 218 (H), BUN 32 (H), GFR 38 (L), GFR if black 45 (L), o/w WNL. (Creatinine 1.38 (H))    Interventions:  1712: 4 mg Zofran IV  1712: 4 mg Morphine IV  1717: NS 1L IV Bolus    Emergency Department Course:  Past medical records, nursing notes, and vitals reviewed.  1652: I performed an exam of the patient and obtained history, as documented above.    The patient was taken for abdomen pelvis CT, see imaging results above.   IV inserted and blood drawn.    I rechecked the patient. Findings and plan explained to the Patient. Patient discharged home with instructions regarding supportive care, medications, and reasons to return. The importance of close follow-up was reviewed.     Impression & Plan      Medical Decision Making:  Patient presents the emergency department complaining of crampy abdominal pain that worsened throughout the day today and associated nausea.  She stated poor output and her colostomy.  Broad differential was considered including but not limited to ischemic bowel, appendicitis, diverticulitis, acute cholecystitis, small bowel obstruction, acute pancreatitis, colitis among others.  Patient denied any dysuria, urgency, frequency or hematuria.  Patient had a generalized tenderness to palpation abdomen but no rebound or guarding and no peritoneal signs.  She was hemodynamically stable and afebrile.  She underwent CT scan to rule out small bowel obstruction or other acute abnormality this was thankfully negative for any acute pathology of the abdomen or pelvis.  Due to her chronic renal insufficiency this was performed on contrast.  Her CBC is within normal limits and other than baseline renal insufficiency and hyperglycemia her labs were unremarkable.  Patient was feeling improved here in the emergency department.  It is possible that there is acute pathology that has not fully declared itself and I recommended she return to the emergency department if she continues  to have failure of output into her colostomy, fever, vomiting, increased abdominal pain.  All patient's questions and concerns were addressed and return precautions the emergency department were reviewed at length.    Diagnosis:    ICD-10-CM   1. Abdominal pain, generalized R10.84       Disposition:  discharged to home    Discharge Medications:  Discharge Medication List as of 10/8/2018  7:53 PM        Juan Pablo Crowell  10/8/2018    EMERGENCY DEPARTMENT    I, Juan Pablo Crowell, zeb serving as a scribe at 4:52 PM on 10/8/2018 to document services personally performed by Renetta Raymond MD based on my observations and the provider's statements to me.        Renetta Raymond MD  10/08/18 3106

## 2018-10-08 NOTE — ED AVS SNAPSHOT
Emergency Department    [address] [place] [id number]    Phone:  [phone]    Fax:  [phone]                                       Chasity Hodgson   MRN: [id number]    Department:   Emergency Department   Date of Visit:  10/8/2018           After Visit Summary Signature Page     I have received my discharge instructions, and my questions have been answered. I have discussed any challenges I see with this plan with the nurse or doctor.    ..........................................................................................................................................  Patient/Patient Representative Signature      ..........................................................................................................................................  Patient Representative Print Name and Relationship to Patient    ..................................................               ................................................  Date                                   Time    ..........................................................................................................................................  Reviewed by Signature/Title    ...................................................              ..............................................  Date                                               Time          22EPIC Rev 08/18

## 2018-10-08 NOTE — ED AVS SNAPSHOT
Emergency Department    [address] [facility] [address]    Phone:  [phone]    Fax:  [phone]                                       Chasity Hodgson   MRN: [id number]    Department:   Emergency Department   Date of Visit:  10/8/2018           Patient Information     Date Of Birth          1945        Your diagnoses for this visit were:     Abdominal pain, generalized        You were seen by Renetta Raymond MD.      Follow-up Information     Follow up with Shola Benoit MD.    Specialty:  Internal Medicine    Contact information:    [address] [facility] [place]  [facility] [id number] [phone]          Follow up with  Emergency Department.    Specialty:  EMERGENCY MEDICINE    Why:  If symptoms worsen, fever, vomiting, worsening or localizing pain    Contact information:    [address] [facility] [address] [phone]        Discharge Instructions         Abdominal Pain    Abdominal pain is pain in the stomach or belly area. Everyone has this pain from time to time. In many cases it goes away on its own. But abdominal pain can sometimes be due to a serious problem, such as appendicitis. So it s important to know when to seek help.  Causes of abdominal pain  There are many possible causes of abdominal pain. Common causes in adults include:    Constipation, diarrhea, or gas    Stomach acid flowing back up into the esophagus (acid reflux or heartburn)    Severe acid reflux, called GERD (gastroesophageal reflux disease)    A sore in the lining of the stomach or small intestine (peptic ulcer)    Inflammation of the gallbladder, liver, or pancreas    Gallstones or kidney stones    Appendicitis     Intestinal blockage     An internal organ pushing through a muscle or other tissue (hernia)    Urinary tract infections    In women, menstrual cramps, fibroids, or endometriosis    Inflammation or infection of the intestines  Diagnosing the cause of abdominal  pain  Your healthcare provider will do a physical exam help find the cause of your pain. If needed, tests will be ordered. Belly pain has many possible causes. So it can be hard to find the reason for your pain. Giving details about your pain can help. Tell your provider where and when you feel the pain, and what makes it better or worse. Also let your provider know if you have other symptoms such as:    Fever    Tiredness    Upset stomach (nausea)    Vomiting    Changes in bathroom habits  Treating abdominal pain  Some causes of pain need emergency medical treatment right away. These include appendicitis or a bowel blockage. Other problems can be treated with rest, fluids, or medicines. Your healthcare provider can give you specific instructions for treatment or self-care based on what is causing your pain.  If you have vomiting or diarrhea, sip water or other clear fluids. When you are ready to eat solid foods again, start with small amounts of easy-to-digest, low-fat foods. These include apple sauce, toast, or crackers.   When to seek medical care  Call 911 or go to the hospital right away if you:    Can t pass stool and are vomiting    Are vomiting blood or have bloody diarrhea or black, tarry diarrhea    Have chest, neck, or shoulder pain    Feel like you might pass out    Have pain in your shoulder blades with nausea    Have sudden, severe belly pain    Have new, severe pain unlike any you have felt before    Have a belly that is rigid, hard, and tender to touch  Call your healthcare provider if you have:    Pain for more than 5 days    Bloating for more than 2 days    Diarrhea for more than 5 days    A fever of 100.4 F (38 C) or higher, or as directed by your healthcare provider    Pain that gets worse    Weight loss for no reason    Continued lack of appetite    Blood in your stool  How to prevent abdominal pain  Here are some tips to help prevent abdominal pain:    Eat smaller amounts of food at one  time.    Avoid greasy, fried, or other high-fat foods.    Avoid foods that give you gas.    Exercise regularly.    Drink plenty of fluids.  To help prevent GERD symptoms:    Quit smoking.    Reduce alcohol and certain foods that increase stomach acid.    Avoid aspirin and over-the-counter pain and fever medicines (NSAIDS or nonsteroidal anti-inflammatory drugs), if possible    Lose extra weight.    Finish eating at least 2 hours before you go to bed or lie down.    Raise the head of your bed.  Date Last Reviewed: 7/1/2016 2000-2017 [employer]. [address] [place] [address]. All rights reserved. This information is not intended as a substitute for professional medical care. Always follow your healthcare professional's instructions.          Your next 10 appointments already scheduled     Oct 11, 2018  7:00 AM CDT   Return Visit with Kavon Pabon [facility] ([facility])    [address] [place] [address] [id number] [phone]            Oct 23, 2018 10:30 AM CDT   Return Visit with Kavon Pabon [facility] ([facility])    [id number] [place] [id number] [phone]            Oct 25, 2018  1:00 PM CDT   Return Visit with MAGALY Carlisle [facility] ([facility])    [address] [address] [id number] [phone]              24 Hour Appointment Hotline       To make an appointment at any [facility], call [phone] ([phone]). If you don't have a family doctor or clinic, we will help you find one. [facility] are conveniently located to serve the needs of you and your family.             Review of your medicines      Our records show that you are taking the medicines listed below. If these are incorrect, please call your family doctor or clinic.        Dose / Directions Last dose taken    ascorbic  acid 1000 MG Tabs   Commonly known as:  vitamin C   Dose:  1000 mg        Take 1,000 mg by mouth daily   Refills:  0        ASTEPRO NA        Refills:  0        atenolol 50 MG tablet   Commonly known as:  TENORMIN   Dose:  50 mg   Quantity:  180 tablet        Take 1 tablet (50 mg) by mouth 2 times daily   Refills:  3        BIOTIN PO   Dose:  1000 mcg        Take 1,000 mcg by mouth   Refills:  0        blood glucose monitoring test strip   Commonly known as:  no brand specified   Quantity:  1 Box        Use to test blood sugar 1 times daily or as directed.   Refills:  6        clotrimazole 1 % cream   Commonly known as:  LOTRIMIN        Apply topically daily   Refills:  0        cycloSPORINE 0.05 % ophthalmic emulsion   Commonly known as:  RESTASIS   Dose:  1 drop        1 drop 2 times daily   Refills:  0        diphenhydrAMINE-acetaminophen  MG tablet   Commonly known as:  TYLENOL PM   Dose:  1 tablet        Take 1 tablet by mouth At Bedtime Reported on 3/20/2017   Refills:  0        famotidine 40 MG tablet   Commonly known as:  PEPCID   Dose:  40 mg        Take 40 mg by mouth At Bedtime   Refills:  0        fenofibrate 145 MG tablet   Dose:  145 mg   Quantity:  90 tablet        Take 1 tablet (145 mg) by mouth daily   Refills:  1        ferrous sulfate 325 (65 Fe) MG tablet   Commonly known as:  IRON   Dose:  325 mg        Take 325 mg by mouth daily (with breakfast)   Refills:  0        fexofenadine 180 MG tablet   Commonly known as:  ALLEGRA   Dose:  180 mg   Quantity:  120        Take 180 mg by mouth daily.   Refills:  0        fluticasone 50 MCG/ACT spray   Commonly known as:  FLONASE   Dose:  2 spray   Quantity:  1 Bottle        Spray 2 sprays in nostril daily 2 sprays in each nostril qd   Refills:  0        gabapentin 100 MG capsule   Commonly known as:  NEURONTIN   Dose:  100 mg   Quantity:  90 capsule        Take 1 capsule (100 mg) by mouth every evening as needed   Refills:  3         hydrochlorothiazide 25 MG tablet   Commonly known as:  HYDRODIURIL   Dose:  25 mg   Quantity:  90 tablet        Take 1 tablet (25 mg) by mouth daily   Refills:  3        lisinopril 20 MG tablet   Commonly known as:  PRINIVIL/ZESTRIL   Dose:  20 mg   Quantity:  90 tablet        Take 1 tablet (20 mg) by mouth daily   Refills:  2        metoclopramide 10 MG tablet   Commonly known as:  REGLAN        Take 1-2 tabs as needed   Refills:  0        multivitamin per tablet   Quantity:  100        ONE DAILY   Refills:  3        nitroGLYcerin 0.4 MG sublingual tablet   Commonly known as:  NITROSTAT   Dose:  0.4 mg   Quantity:  25 tablet        Place 1 tablet (0.4 mg) under the tongue every 5 minutes as needed for chest pain (no more than 3 in one hour; after 3rd, call 911.)   Refills:  3        nystatin cream   Commonly known as:  MYCOSTATIN        Apply topically daily as needed   Refills:  0        nystatin-triamcinolone cream   Commonly known as:  MYCOLOG II        Apply topically daily as needed   Refills:  0        ondansetron 4 MG tablet   Commonly known as:  ZOFRAN   Dose:  1 tablet        Take 1 tablet by mouth every 6 hours as needed Reported on 3/20/2017   Refills:  0        order for DME   Quantity:  1 Units        Equipment being ordered: Oral appliance for sleep apnea   Refills:  0        order for DME   Quantity:  2 each        Equipment being ordered: Compression stockings - Knee High; 20-30 mmHg compression   Refills:  0        order for DME   Quantity:  1 each        Donut Pillow   Refills:  0        pantoprazole 40 MG EC tablet   Commonly known as:  PROTONIX   Dose:  40 mg        Take 40 mg by mouth 2 times daily   Refills:  0        SB LOW DOSE ASA EC 81 MG EC tablet   Dose:  81 mg   Generic drug:  aspirin        Take 81 mg by mouth daily   Refills:  0        sitagliptin 50 MG tablet   Commonly known as:  JANUVIA   Dose:  50 mg        Take 50 mg by mouth daily   Refills:  0        SYNTHROID 112 MCG tablet    Dose:  112 mcg   Generic drug:  levothyroxine        Take 112 mcg by mouth daily Take 112 mcg daily except for Friday takes an additional 56 mcg.  Brand name Synthroid   Refills:  0        TOUJEO SOLOSTAR 300 UNIT/ML injection   Dose:  34 Units   Generic drug:  insulin glargine U-300        Inject 34 Units Subcutaneous every morning   Refills:  0        triamcinolone 0.1 % cream   Commonly known as:  KENALOG        Apply topically daily   Refills:  0        VITAMIN D-3 PO   Dose:  2 tablet        Take 2 tablets by mouth   Refills:  0                Procedures and tests performed during your visit     Basic metabolic panel    CBC with platelets differential    CT Abdomen Pelvis w/o Contrast      Orders Needing Specimen Collection     None      Pending Results     No orders found from 10/6/2018 to 10/9/2018.            Pending Culture Results     No orders found from 10/6/2018 to 10/9/2018.            Pending Results Instructions     If you had any lab results that were not finalized at the time of your Discharge, you can call the ED Lab Result RN at [phone]. You will be contacted by this team for any positive Lab results or changes in treatment. The nurses are available 7 days a week from 10A to 6:30P.  You can leave a message 24 hours per day and they will return your call.        Test Results From Your Hospital Stay        10/8/2018  5:32 PM      Component Results     Component Value Ref Range & Units Status    WBC 8.7 4.0 - 11.0 10e9/L Final    RBC Count 3.85 3.8 - 5.2 10e12/L Final    Hemoglobin 11.6 (L) 11.7 - 15.7 g/dL Final    Hematocrit 34.2 (L) 35.0 - 47.0 % Final    MCV 89 78 - 100 fl Final    MCH 30.1 26.5 - 33.0 pg Final    MCHC 33.9 31.5 - 36.5 g/dL Final    RDW 12.3 10.0 - 15.0 % Final    Platelet Count 205 150 - 450 10e9/L Final    Diff Method Automated Method  Final    % Neutrophils 71.4 % Final    % Lymphocytes 17.6 % Final    % Monocytes 7.5 % Final    % Eosinophils 2.9 % Final    % Basophils  0.3 % Final    % Immature Granulocytes 0.3 % Final    Nucleated RBCs 0 0 /100 Final    Absolute Neutrophil 6.2 1.6 - 8.3 10e9/L Final    Absolute Lymphocytes 1.5 0.8 - 5.3 10e9/L Final    Absolute Monocytes 0.7 0.0 - 1.3 10e9/L Final    Absolute Eosinophils 0.3 0.0 - 0.7 10e9/L Final    Absolute Basophils 0.0 0.0 - 0.2 10e9/L Final    Abs Immature Granulocytes 0.0 0 - 0.4 10e9/L Final    Absolute Nucleated RBC 0.0  Final         10/8/2018  5:49 PM      Component Results     Component Value Ref Range & Units Status    Sodium 140 133 - 144 mmol/L Final    Potassium 4.0 3.4 - 5.3 mmol/L Final    Chloride 103 94 - 109 mmol/L Final    Carbon Dioxide 30 20 - 32 mmol/L Final    Anion Gap 7 3 - 14 mmol/L Final    Glucose 218 (H) 70 - 99 mg/dL Final    Urea Nitrogen 32 (H) 7 - 30 mg/dL Final    Creatinine 1.38 (H) 0.52 - 1.04 mg/dL Final    GFR Estimate 38 (L) >60 mL/min/1.7m2 Final    Non  GFR Calc    GFR Estimate If Black 45 (L) >60 mL/min/1.7m2 Final    African American GFR Calc    Calcium 9.1 8.5 - 10.1 mg/dL Final         10/8/2018  7:51 PM      Narrative     CT ABDOMEN AND PELVIS WITHOUT CONTRAST   10/8/2018 6:46 PM     HISTORY: Abdominal pain.    TECHNIQUE: Volumetric helical sections were acquired from the lung  bases through the ischial tuberosities without IV contrast. Coronal  images were also reconstructed. Radiation dose for this scan was  reduced using automated exposure control, adjustment of the mA and/or  kV according to patient size, or iterative reconstruction technique.    COMPARISON: CT of the abdomen and pelvis performed 11/20/2018.    FINDINGS:  No bowel obstruction. No evidence for colitis or  diverticulitis. A left lower quadrant colostomy is again noted. No  free fluid in the pelvis. Uterus is not seen, and is likely surgically  absent. A spherical prosthetic device is again noted in the pelvis and  is likely adjacent to or within the urinary bladder. The gallbladder  is not seen,  and is likely surgically absent. The liver, spleen,  adrenal glands, pancreas, and kidneys have unremarkable noncontrast  appearances. Degenerative changes are noted in the visualized  thoracolumbar spine and left hip. Indeterminate 0.7 cm pulmonary  nodule in the left lower lobe medially (series 2 image 8) is unchanged  for greater than 2 years, and is therefore highly likely to be of  benign etiology.. The visualized lung bases are otherwise clear.        Impression     IMPRESSION: No acute abnormality in the abdomen or pelvis. No cause  for abdominal pain is identified.          MONTSERRAT PARSON MD                Clinical Quality Measure: Blood Pressure Screening     Your blood pressure was checked while you were in the emergency department today. The last reading we obtained was  BP: 185/58 . Please read the guidelines below about what these numbers mean and what you should do about them.  If your systolic blood pressure (the top number) is less than 120 and your diastolic blood pressure (the bottom number) is less than 80, then your blood pressure is normal. There is nothing more that you need to do about it.  If your systolic blood pressure (the top number) is 120-139 or your diastolic blood pressure (the bottom number) is 80-89, your blood pressure may be higher than it should be. You should have your blood pressure rechecked within a year by a primary care provider.  If your systolic blood pressure (the top number) is 140 or greater or your diastolic blood pressure (the bottom number) is 90 or greater, you may have high blood pressure. High blood pressure is treatable, but if left untreated over time it can put you at risk for heart attack, stroke, or kidney failure. You should have your blood pressure rechecked by a primary care provider within the next 4 weeks.  If your provider in the emergency department today gave you specific instructions to follow-up with your doctor or provider even sooner than that,  you should follow that instruction and not wait for up to 4 weeks for your follow-up visit.        Thank you for choosing [facility]       Thank you for choosing [facility] for your care. Our goal is always to provide you with excellent care. Hearing back from our patients is one way we can continue to improve our services. Please take a few minutes to complete the written survey that you may receive in the mail after you visit with us. Thank you!        [employer] Information     [employer] gives you secure access to your electronic health record. If you see a primary care provider, you can also send messages to your care team and make appointments. If you have questions, please call your primary care clinic.  If you do not have a primary care provider, please call [phone] and they will assist you.        Care EveryWhere ID     This is your Care EveryWhere ID. This could be used by other organizations to access your [facility] medical records  [id number]        Equal Access to Services     CORINNE ROBB : Bang Olea, nicole izaguirre, jose tse . So [facility] [phone].    ATENCIÓN: Si habla español, tiene a modi disposición servicios gratuitos de asistencia lingüística. Llame al [phone].    We comply with applicable federal civil rights laws and [place] laws. We do not discriminate on the basis of race, color, national origin, age, disability, sex, sexual orientation, or gender identity.            After Visit Summary       This is your record. Keep this with you and show to your community pharmacist(s) and doctor(s) at your next visit.

## 2018-10-09 NOTE — ED NOTES
Nurse verified with pt that she would get a ride home from spouse due to administration of morphine during ER stay.

## 2018-10-09 NOTE — DISCHARGE INSTRUCTIONS

## 2018-10-11 ENCOUNTER — OFFICE VISIT (OUTPATIENT)
Dept: PSYCHOLOGY | Facility: CLINIC | Age: 73
End: 2018-10-11
Payer: MEDICARE

## 2018-10-11 DIAGNOSIS — F43.22 ADJUSTMENT DISORDER WITH ANXIETY: Primary | ICD-10-CM

## 2018-10-11 PROCEDURE — 90834 PSYTX W PT 45 MINUTES: CPT | Performed by: MARRIAGE & FAMILY THERAPIST

## 2018-10-11 ASSESSMENT — ANXIETY QUESTIONNAIRES
1. FEELING NERVOUS, ANXIOUS, OR ON EDGE: NOT AT ALL
GAD7 TOTAL SCORE: 0
3. WORRYING TOO MUCH ABOUT DIFFERENT THINGS: NOT AT ALL
7. FEELING AFRAID AS IF SOMETHING AWFUL MIGHT HAPPEN: NOT AT ALL
IF YOU CHECKED OFF ANY PROBLEMS ON THIS QUESTIONNAIRE, HOW DIFFICULT HAVE THESE PROBLEMS MADE IT FOR YOU TO DO YOUR WORK, TAKE CARE OF THINGS AT HOME, OR GET ALONG WITH OTHER PEOPLE: NOT DIFFICULT AT ALL
5. BEING SO RESTLESS THAT IT IS HARD TO SIT STILL: NOT AT ALL
6. BECOMING EASILY ANNOYED OR IRRITABLE: NOT AT ALL
2. NOT BEING ABLE TO STOP OR CONTROL WORRYING: NOT AT ALL

## 2018-10-11 ASSESSMENT — PATIENT HEALTH QUESTIONNAIRE - PHQ9: 5. POOR APPETITE OR OVEREATING: NOT AT ALL

## 2018-10-11 NOTE — MR AVS SNAPSHOT
MRN:[id number]                      After Visit Summary   10/11/2018    Chasity Hodgson    MRN: [id number]           Visit Information        Provider Department      10/11/2018 7:00 AM Kavon Pabon LMFT [facility] Generic      Your next 10 appointments already scheduled     Oct 23, 2018  2:30 PM CDT   Return Visit with MAGALY Carlisle CNP   [facility] ([facility])    [address] [facility]  [address]  [facility] [id number] [phone]            Nov 06, 2018  4:00 PM CST   Return Visit with Kavon CELIA Pabon   [facility] ([facility])    [address] [facility] [address] [phone]              MyChart Information     [employer] gives you secure access to your electronic health record. If you see a primary care provider, you can also send messages to your care team and make appointments. If you have questions, please call your primary care clinic.  If you do not have a primary care provider, please call [phone] and they will assist you.        Care EveryWhere ID     This is your Care EveryWhere ID. This could be used by other organizations to access your [facility] medical records  [id number]        Equal Access to Services     CORINNE ROBB AH: Bang milton Sobhavna, waaxda luqadaha, qaybta kaalmada adeegyada, jose kim. So [facility] [phone].    ATENCIÓN: Si habla español, tiene a modi disposición servicios gratuitos de asistencia lingüística. Llame al [phone].    We comply with applicable federal civil rights laws and [place] laws. We do not discriminate on the basis of race, color, national origin, age, disability, sex, sexual orientation, or gender identity.

## 2018-10-11 NOTE — PROGRESS NOTES
Progress Note    Client Name: Chasity Hodgson  Date: 10/11/2018         Service Type: Individual      Session Start Time: 7am  Session End Time: 7:45am      Session Length: 45 minutes     Session #: 3     Attendees: Client attended alone    Treatment Plan Last Reviewed: 10/11/2018  PHQ-9 / JAYLEN-7 : 10/11/2018     DATA      Progress Since Last Session (Related to Symptoms / Goals / Homework):   Symptoms: Improving JAYLEN 7 and per client reports    Homework: Partially completed      Episode of Care Goals: Minimal progress - ACTION (Actively working towards change); Intervened by reinforcing change plan / affirming steps taken     Current / Ongoing Stressors and Concerns:   - Sx of anxiety being the primary caretaker of her  with Parkinson's   - Marital distress   - Self-care  Client reports that she has spoken to her adult children about wanting their input for when she and her  return to the doctor. She wants the doctor to know that his medical problems have progressed over the past 6 months. She also wants the doctor to provide education about his diagnosis which she feels did not happen when he was initially diagnosed. The client will also start to think about her own health and near future. She admits that although she loves her job she would like to retire within the next year. Having a plan has helped ease her stress and worry.        Treatment Objective(s) Addressed in This Session:   identify 2 strategies to more effectively address stressors  use at least 1 coping skills for anxiety management in the next 2 weeks       Intervention:   CBT: identify patterns and alternatives for desired change  Motivational Interviewing: what can she do in the present to reach an attainable goal  Structural: work to establish a family structure that fits their needs with her 's medical problems        ASSESSMENT: Current Emotional / Mental Status (status of  significant symptoms):   Risk status (Self / Other harm or suicidal ideation)   Client denies current fears or concerns for personal safety.   Client denies current or recent suicidal ideation or behaviors.   Client denies current or recent homicidal ideation or behaviors.   Client denies current or recent self injurious behavior or ideation.   Client denies other safety concerns.   Client Client reports there has been no change in risk factors since their last session.     Client Client reports there has been no change in protective factors since their last session.     A safety and risk management plan has not been developed at this time, however client was given the after-hours number / 911 should there be a change in any of these risk factors.     Appearance:   Appropriate    Eye Contact:   Good    Psychomotor Behavior: Normal    Attitude:   Cooperative    Orientation:   All   Speech    Rate / Production: Normal     Volume:  Normal    Mood:    Anxious    Affect:    Appropriate  Worrisome    Thought Content:  Clear    Thought Form:  Coherent  Goal Directed  Logical    Insight:    Good      Medication Review:   No current psychiatric medications prescribed     Medication Compliance:   NA     Changes in Health Issues:   None reported     Chemical Use Review:   Substance Use: Chemical use reviewed, no active concerns identified      Tobacco Use: No current tobacco use.       Collateral Reports Completed:   Not Applicable    PLAN: (Client Tasks / Therapist Tasks / Other)  Client will gather family information for her 's doctor's appt next week and will ask for more information for what to expect in the future, caring for her . Client will also visit more assisted living facilities in preparation for what housing changes may happen in the future.         Kavon Pabon, CELIA, LICSW                                                      "    ________________________________________________________________________    Treatment Plan    Client's Name: Chasity Hodgson  YOB: 1945    Date: 9/26/2018    DSM-V Diagnoses: Adjustment Disorders  309.24 (F43.22) With anxiety  Psychosocial & Contextual Factors: Client is experiencing symptoms of anxiety related to feeling overwhelmed by life changes following her 's diagnosis of Parkinson's disease.   WHODAS 2.0 (12 item) 18.75% on 9/19/2018     Referral / Collaboration:  Referral to another professional/service is not indicated at this time.    Anticipated number of session or this episode of care: 6-8      MeasurableTreatment Goal(s) related to diagnosis / functional impairment(s)  Goal 1: Client will improve boundaries to help diminish symptoms of anxiety by 50% for a minimum of 4 weeks.     Learn more about Parkinson's. Take some time for my self. Get my house cleaned up and ready for the holidays.\"     Objective #A (Client Action)      Client will compile a list of boundaries that they would like to set with others: , self  Learning to fight fair, learning to identify positive and negative communication patterns           Status: New - Date: 10/11/2018     Intervention(s)  Therapist will teach about healthy boundaries. structure, saying \"no\", advocating, identifying and establishing appropriate roles, rules, expectations.    Objective #B  Client will practice setting boundaries daily times in the next 8 weeks.                    Status: New - Date: 10/11/2018    Intervention(s)  Therapist will assign homework to track the use of healthy boundaries and outcome of establishing relational change  role-play effective communication skills and conflict management    Objective #C  Client will learn & utilize at least 3 assertive communication skills weekly.  Status: New - Date: 10/11/2018    Intervention(s)  teach assertiveness skills. aggressive/passive/assertive, impulsive control, " reactive vs sensitive, exposure to uncomfortable conversation.  Therapist will role-play assertiveness skills    Objective #D  Client will use at least 4 coping skills for anxiety management in the next 10 weeks.   Status: New - Date: 10/11/2018    Intervention(s)  Therapist will assign homework worksheet to challenge anxious thoughts, values and barriers, rational counter-statements  teach emotional regulation skills. deep breathing, grounding, progressive muscle relaxation, TIP skills    Objective #E  Client will use thought-stopping strategy daily to reduce intrusive thoughts.  Status: New - Date: 10/11/2018    Intervention(s)  provide safe space to address hx of presenting problem and root cause  Teach mindfulness practices, silent observer, ACT metaphors and managing stressors  Sandtray: exercise to stage presenting problem, reflect, process and problem solve.      Client has reviewed and agreed to the above plan.      CELIA Rodrigues, LICSW  October 11, 2018

## 2018-10-12 ASSESSMENT — ANXIETY QUESTIONNAIRES: GAD7 TOTAL SCORE: 0

## 2018-10-12 ASSESSMENT — PATIENT HEALTH QUESTIONNAIRE - PHQ9: SUM OF ALL RESPONSES TO PHQ QUESTIONS 1-9: 1

## 2018-10-22 NOTE — PROGRESS NOTES
[facility]    Date of visit: 10/22/2018    Chief complaint:   Chief Complaint   Patient presents with     Pain       Interval history:  Chasity Hodgson is a 73 year old female last seen by me on 9/20/18.      Recommendations/plan at the last visit included:   1.  Physical Therapy:  YES   I advised Sammie to go to remaining sessions with [facility]. Would recommend follow up sessions with JOEY Kaba once this is completed.    2.  Pain Psychologist to address relaxation, behavioral change, coping style, and other factors important to improvement.  YES   Could consider in the future, for now recommend she focus on visits with Kavon Pabon.    3.  Medication Management:     1. We discussed an increase in gabapentin vs. starting Cymbalta for pain next. As she is already taking gabapentin, she would prefer to first increase in gabapentin (of note, she has tried higher doses in the past with some issues but would still like to try). For 1-2 weeks, try taking 200mg of gabapentin at bedtime. If she tolerates this well, would recommend 100mg morning dose as well. Call with issues.    2.  If gabapentin not helpful/side effects are too bothersome, would recommend Cymbalta.     3. Continue using Salonpas patches for knee pain, okay to use on shoulder as well.   4.  Potential procedures: not at this time.     5.  Go to follow up with Dr. Ferrera (orthopedic surgeon) as scheduled.    6.  Follow up with MAGALY Dan CNP in 4-6 weeks.    7.  Patient to follow up with Primary Care provider regarding elevated blood pressure. She has not taken her BP medication yet today.     Since her last visit, Chasity Hodgson reports:  -Her pain is somewhat better than it was at last visit.   -She tried increasing her gabapentin as directed, took 200mg at bedtime. She found that when she tried to do this, she felt fatigued during the day. She returned to 100mg at bedtime.   -She states she would prefer to avoid  starting a medication that makes her sleepy at this time. She would like to just use Tylenol as needed for more bothersome pain.   -She had a follow up visit with Dr. Ferrera- her orthopedic surgeon. He told Sammie that her right arm pain may be related to edema. He recommended she continue exercises and that this should improve over time.  -She had a follow up visit with TITA Reyes regarding left shoulder pain. He told her that the pain is likely due to overuse and rotator cuff issues.  -If her pain doesn't improve in the next 3 months, she will have a cortisone injection.  -She is interested in trying Salonpas patches to left shoulder, this is very helpful for her knee.   -She had another visit with Kavon Pabon, found this helpful. She has follow up scheduled for earlier November.   -She continues physical therapy with Jun with [facility]. She goes once every three weeks, finds this helpful.   -She was seen in the ED for cramping abdominal pain about 1.5 weeks ago. It was not clear what was causing this abdominal pain, this resolved on its own.       Pain Information:   Pain quality: aching   Pain timing: constant     Pain rating: intensity ranges from 2/10 to 8/10, and averages 5/10 on a 0-10 scale.   Aggravating factors include: repetitive use, reaching   Relieving factors include: cold packs, Tylenol, Salonpas patches,    Current pain treatments:    Tylenol 500mg- H after an hour, 2 tabs BID or TID   Biofreeze- H for knee pain after an hour   Salonpas- H! for knee pain, haven't tried for shoulder pain   Gabapentin 100mg at bedtime (has been on higher doses with dizziness)- ? for sleep or pain    Current MME: 0    Annual Controlled Substance Agreement/UDS due date: N/A    Past pain treatments:  1. Previous Pain Relevant Medications:  NOTE: This medication information taken from patient's intake form, not medical records.                         Opiates: oxycodone- H                        NSAIDS: cannot take  due to abnormal kidney function                        Muscle Relaxants: Flexeril- H, Robaxin- NH, SE, vivid dreams                        Anti-migraine mediations: no                        Anti-depressants: amitriptyline- ?                         Sleep aids: Ambien- SE, hang over                        Anxiolytics: no                        Neuropathics: gabapentin- ?                                    Topicals: Biofreeze- H for knee pain                        Other medications not covered above: Tylenol- H      2. Physical Therapy: completed physical therapy for right shoulder  3. Pain Psychology: no, meeting with a psychologist Kavon Pabon  4. Surgery: right shoulder replacement 4/4/18 Adam Ferrera  5. Injections: left knee steroid injections and [facility] last in 8/13/18  6. Chiropractic: no  7. Acupuncture: no  8. TENS Unit: no    Medications:  Current Outpatient Prescriptions   Medication Sig Dispense Refill     ascorbic acid (VITAMIN C) 1000 MG TABS Take 1,000 mg by mouth daily       aspirin (SB LOW DOSE ASA EC) 81 MG EC tablet Take 81 mg by mouth daily       atenolol (TENORMIN) 50 MG tablet Take 1 tablet (50 mg) by mouth 2 times daily 180 tablet 3     Azelastine HCl (ASTEPRO NA)        BIOTIN PO Take 1,000 mcg by mouth       blood glucose monitoring (NO BRAND SPECIFIED) test strip Use to test blood sugar 1 times daily or as directed. 1 Box 6     Cholecalciferol (VITAMIN D-3 PO) Take 2 tablets by mouth       clotrimazole (LOTRIMIN) 1 % cream Apply topically daily       cycloSPORINE (RESTASIS) 0.05 % ophthalmic emulsion 1 drop 2 times daily       diphenhydrAMINE-acetaminophen (TYLENOL PM)  MG tablet Take 1 tablet by mouth At Bedtime Reported on 3/20/2017       famotidine (PEPCID) 40 MG tablet Take 40 mg by mouth At Bedtime       fenofibrate 145 MG tablet Take 1 tablet (145 mg) by mouth daily 90 tablet 1     ferrous sulfate (IRON) 325 (65 FE) MG tablet Take 325 mg by mouth  daily (with breakfast)       fexofenadine (ALLEGRA) 180 MG tablet Take 180 mg by mouth daily. 120 0     fluticasone (FLONASE) 50 MCG/ACT spray Spray 2 sprays in nostril daily 2 sprays in each nostril qd 1 Bottle 0     gabapentin (NEURONTIN) 100 MG capsule Take 1 capsule (100 mg) by mouth every evening as needed 90 capsule 3     hydrochlorothiazide (HYDRODIURIL) 25 MG tablet Take 1 tablet (25 mg) by mouth daily 90 tablet 3     insulin glargine U-300 (TOUJEO SOLOSTAR) 300 UNIT/ML injection Inject 34 Units Subcutaneous every morning        levothyroxine (SYNTHROID) 112 MCG tablet Take 112 mcg by mouth daily Take 112 mcg daily except for Friday takes an additional 56 mcg.  Brand name Synthroid       lisinopril (PRINIVIL/ZESTRIL) 20 MG tablet Take 1 tablet (20 mg) by mouth daily 90 tablet 2     metoclopramide (REGLAN) 10 MG tablet Take 1-2 tabs as needed       MULTIVITAMINS OR TABS ONE DAILY 100 3     nitroGLYcerin (NITROSTAT) 0.4 MG sublingual tablet Place 1 tablet (0.4 mg) under the tongue every 5 minutes as needed for chest pain (no more than 3 in one hour; after 3rd, call 911.) 25 tablet 3     nystatin (MYCOSTATIN) cream Apply topically daily as needed        nystatin-triamcinolone (MYCOLOG II) cream Apply topically daily as needed       ondansetron (ZOFRAN) 4 MG tablet Take 1 tablet by mouth every 6 hours as needed Reported on 3/20/2017       order for DME Equipment being ordered: Compression stockings - Knee High; 20-30 mmHg compression 2 each 0     order for DME Donut Pillow 1 each 0     order for DME Equipment being ordered: Oral appliance for sleep apnea 1 Units 0     pantoprazole (PROTONIX) 40 MG EC tablet Take 40 mg by mouth 2 times daily       sitagliptin (JANUVIA) 50 MG tablet Take 50 mg by mouth daily       triamcinolone (KENALOG) 0.1 % cream Apply topically daily         Medical History: any changes in medical history since they were last seen? No    Review of Systems:  The 14 system ROS was reviewed from  "the intake questionnaire, and is positive for: fatigue, headache, alelrgies, itching, HTN, abdominal pain, nausea, diabetes, joint pain, arthritis, incontinence (urge)  Any bowel or bladder problems: currently denies  Mood: \"its been good\"     Physical Exam:  Blood pressure 167/65, pulse 66, weight 94.3 kg (208 lb), SpO2 96 %, not currently breastfeeding.  General: A&O x4, no signs of distress. Obese.   Gait: Slow.   MSK exam: 5/5 upper and lower extremity strength.    Labs:  CRP was completed on 8/1/18 and shows:  CRP Inflammation 7.2   0.0 - 8.0 mg/L Final 08/01/2018 11:52 AM 51      Sed rate was completed on 8/1/18 and shows:  Sed Rate 15   0 - 30 mm/h Final 08/01/2018 11:52 AM 54      Lyme antibody was completed on 8/1/18 and shows:  Lyme Disease Antibodies Serum <0.01   0.00 - 0.89   Final 08/01/2018 11:52 AM 51      CK was completed on 8/1/18 and shows:  CK Total 104   30 - 225 U/L Final 08/01/2018 11:52 AM 65       Assessment:   Chasity Hodgson is a 73 year old female with a past medical history significant for poliomyelitis, allergic rhinitis, ANNETTE, gastroparesis, esophageal reflux, gastroparesis, morbid obesity, hyperlipidemia, hypothyroidism, type 2 diabetes mellitus, anemia, DVT, and fibromyalgia who presents with complaints of bilateral arm and left leg pain.      1. Bilateral arm pain and left leg pain- etiology may be related to fibromyalgia, s/p right shoulder replacement 4/4/18 [facility] Dr. Ferrera, hx of injury to left shoulder as well.   2. Mental Health - the patient's mental health concerns affect her experience of pain and contribute to her clinically significant distress.     1. Myalgia    2. Chronic pain of both shoulders       Plan:    We discussed that Sammie has completed participation in our pain program. She reports her pain is notably improved. She will follow up with me as needed for worsened or new pain in the future.     1. Recommend continuing follow up with orthopedic surgery, Dr. Ferrera and " Natalio as planned.  2. Physical therapy has been helpful with Gilmer COOK. She will continue physical therapy with Jun as planned.  3. Continue Tylenol, Biofreeze, and lidocaine patches as needed. She has not applied to shoulders, though is helpful for knee pain. She will try this.   4. Patient to follow up with Primary Care provider regarding elevated blood pressure. She hasn't taken her BP medication yet today.       I spent 30 minutes of time face to face with the patient.  Greater than 50% of this time was spent in patient counseling and/or coordination of care.    Stephanie MCKENZIE [facility]

## 2018-10-23 ENCOUNTER — OFFICE VISIT (OUTPATIENT)
Dept: PALLIATIVE MEDICINE | Facility: CLINIC | Age: 73
End: 2018-10-23
Payer: MEDICARE

## 2018-10-23 VITALS
BODY MASS INDEX: 39.3 KG/M2 | WEIGHT: 208 LBS | HEART RATE: 66 BPM | SYSTOLIC BLOOD PRESSURE: 167 MMHG | OXYGEN SATURATION: 96 % | DIASTOLIC BLOOD PRESSURE: 65 MMHG

## 2018-10-23 DIAGNOSIS — M79.10 MYALGIA: Primary | ICD-10-CM

## 2018-10-23 DIAGNOSIS — M25.511 CHRONIC PAIN OF BOTH SHOULDERS: ICD-10-CM

## 2018-10-23 DIAGNOSIS — M25.512 CHRONIC PAIN OF BOTH SHOULDERS: ICD-10-CM

## 2018-10-23 DIAGNOSIS — G89.29 CHRONIC PAIN OF BOTH SHOULDERS: ICD-10-CM

## 2018-10-23 PROCEDURE — 99214 OFFICE O/P EST MOD 30 MIN: CPT | Performed by: NURSE PRACTITIONER

## 2018-10-23 ASSESSMENT — PAIN SCALES - GENERAL: PAINLEVEL: MILD PAIN (3)

## 2018-10-23 NOTE — PATIENT INSTRUCTIONS
1. Recommend continuing evaluation with Dr. Ferrera and Natalio as planned.  2. Continue physical therapy with Jun as planned.  3. Continue Tylenol, Biofreeze, and lidocaine patches. Make sure to apply patches on shoulder.  4. Follow up with MAGALY Dan CNP if pain worsens in the future.   ----------------------------------------------------------------  Clinic Number:  [phone]   Call this number with any questions about your care and for scheduling assistance. Calls are returned Monday through Friday between 8 AM and 4:30 PM. We usually get back to you within 2 business days depending on the issue/request.       Medication refills:    For non-narcotic medications, call your pharmacy directly to request a refill. The pharmacy will contact the [facility] Management [facility] for authorization. Please allow 3-4 days for these refills to be processed.     For narcotic refills, call the clinic number or send a [email or web address] message. Please contact us 7-10 days before your refill is due. The message MUST include the name of the specific medication(s) requested and how you would like to receive the prescription(s). The options are as follows:    Pain Clinic staff can mail the prescription to your pharmacy. Please tell us the name of the pharmacy.    You may pick the prescription up at the Pain Clinic (tell us the location) or during a clinic visit with your pain provider    Pain Clinic staff can deliver the prescription to the [facility] pharmacy in the clinic building. Please tell us the location.      We believe regular attendance is key to your success in our program.    Any time you are unable to keep your appointment we ask that you call us at least 24 hours in advance to let us know. This will allow us to offer the appointment time to another patient.

## 2018-10-23 NOTE — MR AVS SNAPSHOT
After Visit Summary   10/23/2018    Chasity Hodgson    MRN: [id number]           Patient Information     Date Of Birth          1945        Visit Information        Provider Department      10/23/2018 2:30 PM Abbi Spence APRN CNP [facility]        Care Instructions    1. Recommend continuing evaluation with Dr. Ferrera and Natalio as planned.  2. Continue physical therapy with Jun as planned.  3. Continue Tylenol, Biofreeze, and lidocaine patches. Make sure to apply patches on shoulder.  4. Follow up with MAGALY Dan CNP if pain worsens in the future.   ----------------------------------------------------------------  Clinic Number:  [phone]   Call this number with any questions about your care and for scheduling assistance. Calls are returned Monday through Friday between 8 AM and 4:30 PM. We usually get back to you within 2 business days depending on the issue/request.       Medication refills:    For non-narcotic medications, call your pharmacy directly to request a refill. The pharmacy will contact the [facility] for authorization. Please allow 3-4 days for these refills to be processed.     For narcotic refills, call the clinic number or send a [employer] message. Please contact us 7-10 days before your refill is due. The message MUST include the name of the specific medication(s) requested and how you would like to receive the prescription(s). The options are as follows:    Pain Clinic staff can mail the prescription to your pharmacy. Please tell us the name of the pharmacy.    You may pick the prescription up at the Pain Clinic (tell us the location) or during a clinic visit with your pain provider    Pain Clinic staff can deliver the prescription to the [facility] pharmacy in the clinic building. Please tell us the location.      We believe regular attendance is key to your success in our program.    Any time you are unable to keep your appointment  we ask that you call us at least 24 hours in advance to let us know. This will allow us to offer the appointment time to another patient.               Follow-ups after your visit        Your next 10 appointments already scheduled     Nov 06, 2018  4:00 PM CST   Return Visit with CELIA Rodrigues   [facility]    [address] [address] [address] [phone]              Who to contact     If you have questions or need follow up information about today's clinic visit or your schedule please contact [facility] directly at [phone].  Normal or non-critical lab and imaging results will be communicated to you by [employer], letter or phone within 4 business days after the clinic has received the results. If you do not hear from us within 7 days, please contact the clinic through [employer] or phone. If you have a critical or abnormal lab result, we will notify you by phone as soon as possible.  Submit refill requests through [facility] or call your pharmacy and they will forward the refill request to us. Please allow 3 business days for your refill to be completed.          Additional Information About Your Visit        [employer] Information     [facility] gives you secure access to your electronic health record. If you see a primary care provider, you can also send messages to your care team and make appointments. If you have questions, please call your primary care clinic.  If you do not have a primary care provider, please call [phone] and they will assist you.        Care EveryWhere ID     This is your Care EveryWhere ID. This could be used by other organizations to access your [facility] medical records  [id number]        Your Vitals Were     Pulse Pulse Oximetry BMI (Body Mass Index)             66 96% 39.3 kg/m2          Blood Pressure from Last 3 Encounters:   10/23/18 167/65   10/08/18 177/64   09/20/18 156/62    Weight from Last 3 Encounters:    10/23/18 94.3 kg (208 lb)   10/08/18 94.3 kg (208 lb)   08/22/18 93.4 kg (206 lb)              Today, you had the following     No orders found for display       Primary Care Provider Office Phone # Fax #    Shola Benoit [phone] [address]  [place] [address]        Equal Access to Services     CORINNE ROBB : Hadii aad ku hadasho Soomaali, waaxda luqadaha, qaybta kaalmada adeegyada, waxay idiin hayaan adeeg kharash lakurt . So [facility] [phone].    ATENCIÓN: Si chrisla esposiel, tiene a modi disposición servicios gratuitos de asistencia lingüística. Llame al [phone].    We comply with applicable federal civil rights laws and [place] laws. We do not discriminate on the basis of race, color, national origin, age, disability, sex, sexual orientation, or gender identity.            Thank you!     Thank you for choosing [facility]  for your care. Our goal is always to provide you with excellent care. Hearing back from our patients is one way we can continue to improve our services. Please take a few minutes to complete the written survey that you may receive in the mail after your visit with us. Thank you!             Your Updated Medication List - Protect others around you: Learn how to safely use, store and throw away your medicines at www.disposemymeds.org.          This list is accurate as of 10/23/18  2:58 PM.  Always use your most recent med list.                   Brand Name Dispense Instructions for use Diagnosis    ascorbic acid 1000 MG Tabs    vitamin C     Take 1,000 mg by mouth daily        ASTEPRO NA           atenolol 50 MG tablet    TENORMIN    180 tablet    Take 1 tablet (50 mg) by mouth 2 times daily    Essential hypertension       BIOTIN PO      Take 1,000 mcg by mouth        blood glucose monitoring test strip    no brand specified    1 Box    Use to test blood sugar 1 times daily or as directed.    Type 2 diabetes mellitus with  other specified complication (H)       clotrimazole 1 % cream    LOTRIMIN     Apply topically daily        cycloSPORINE 0.05 % ophthalmic emulsion    RESTASIS     1 drop 2 times daily        diphenhydrAMINE-acetaminophen  MG tablet    TYLENOL PM     Take 1 tablet by mouth At Bedtime Reported on 3/20/2017        famotidine 40 MG tablet    PEPCID     Take 40 mg by mouth At Bedtime        fenofibrate 145 MG tablet     90 tablet    Take 1 tablet (145 mg) by mouth daily    CARDIOVASCULAR SCREENING; LDL GOAL LESS THAN 130       ferrous sulfate 325 (65 Fe) MG tablet    IRON     Take 325 mg by mouth daily (with breakfast)        fexofenadine 180 MG tablet    ALLEGRA    120    Take 180 mg by mouth daily.        fluticasone 50 MCG/ACT spray    FLONASE    1 Bottle    Spray 2 sprays in nostril daily 2 sprays in each nostril qd    Seasonal allergic rhinitis due to other allergic trigger       gabapentin 100 MG capsule    NEURONTIN    90 capsule    Take 1 capsule (100 mg) by mouth every evening as needed    Poliomyelitis       hydrochlorothiazide 25 MG tablet    HYDRODIURIL    90 tablet    Take 1 tablet (25 mg) by mouth daily    Essential hypertension       lisinopril 20 MG tablet    PRINIVIL/ZESTRIL    90 tablet    Take 1 tablet (20 mg) by mouth daily    Essential hypertension       metoclopramide 10 MG tablet    REGLAN     Take 1-2 tabs as needed        multivitamin per tablet     100    ONE DAILY        nitroGLYcerin 0.4 MG sublingual tablet    NITROSTAT    25 tablet    Place 1 tablet (0.4 mg) under the tongue every 5 minutes as needed for chest pain (no more than 3 in one hour; after 3rd, call 911.)    Atypical chest pain       nystatin cream    MYCOSTATIN     Apply topically daily as needed        nystatin-triamcinolone cream    MYCOLOG II     Apply topically daily as needed        ondansetron 4 MG tablet    ZOFRAN     Take 1 tablet by mouth every 6 hours as needed Reported on 3/20/2017        order for DME     1 Units     Equipment being ordered: Oral appliance for sleep apnea    ANNETTE (obstructive sleep apnea)       order for DME     2 each    Equipment being ordered: Compression stockings - Knee High; 20-30 mmHg compression    Insufficiency, arterial, peripheral (H)       order for DME     1 each    Donut Pillow    Coccydynia       pantoprazole 40 MG EC tablet    PROTONIX     Take 40 mg by mouth 2 times daily        SB LOW DOSE ASA EC 81 MG EC tablet   Generic drug:  aspirin      Take 81 mg by mouth daily        sitagliptin 50 MG tablet    JANUVIA     Take 50 mg by mouth daily        SYNTHROID 112 MCG tablet   Generic drug:  levothyroxine      Take 112 mcg by mouth daily Take 112 mcg daily except for Friday takes an additional 56 mcg.  Brand name Synthroid        TOUJEO SOLOSTAR 300 UNIT/ML injection   Generic drug:  insulin glargine U-300      Inject 34 Units Subcutaneous every morning        triamcinolone 0.1 % cream    KENALOG     Apply topically daily        VITAMIN D-3 PO      Take 2 tablets by mouth

## 2018-10-25 ENCOUNTER — OFFICE VISIT (OUTPATIENT)
Dept: PSYCHOLOGY | Facility: CLINIC | Age: 73
End: 2018-10-25
Payer: MEDICARE

## 2018-10-25 DIAGNOSIS — F43.22 ADJUSTMENT DISORDER WITH ANXIETY: Primary | ICD-10-CM

## 2018-10-25 PROCEDURE — 90834 PSYTX W PT 45 MINUTES: CPT | Performed by: MARRIAGE & FAMILY THERAPIST

## 2018-10-25 NOTE — PROGRESS NOTES
Progress Note    Client Name: Chasity Hodgson  Date: 10/25/2018         Service Type: Individual      Session Start Time: 3:05pm  Session End Time: 3:50am      Session Length: 45 minutes     Session #: 4     Attendees: Client attended alone    Treatment Plan Last Reviewed: 10/11/2018  PHQ-9 / JAYLEN-7 : 10/11/2018     DATA      Progress Since Last Session (Related to Symptoms / Goals / Homework):   Symptoms: Improving JAYLEN 7 and per client reports    Homework: Achieved / completed to satisfaction      Episode of Care Goals: Satisfactory progress - ACTION (Actively working towards change); Intervened by reinforcing change plan / affirming steps taken     Current / Ongoing Stressors and Concerns:   - Sx of anxiety 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 She feels that it helped for the appointment as there was more material covered. It resulted in her  going back to physical therapy. The client was praised for her support for her . She also started to plan for ways she can continue to take the lead and go for more walks which would be an overall benefit for both of them. In the winter they will still walk and go to the mall.      Treatment Objective(s) Addressed in This Session:   identify 2 strategies to more effectively address stressors  use cognitive strategies identified in therapy to challenge anxious thoughts       Intervention:   CBT: identify patterns and alternatives for desired change  Motivational Interviewing: what can she do in the present to reach an attainable goal  Structural: work to establish a family structure that fits their needs with her 's medical problems        ASSESSMENT: Current Emotional / Mental Status (status of significant  symptoms):   Risk status (Self / Other harm or suicidal ideation)   Client denies current fears or concerns for personal safety.   Client denies current or recent suicidal ideation or behaviors.   Client denies current or recent homicidal ideation or behaviors.   Client denies current or recent self injurious behavior or ideation.   Client denies other safety concerns.   Client Client reports there has been no change in risk factors since their last session.     Client Client reports there has been no change in protective factors since their last session.     A safety and risk management plan has not been developed at this time, however client was given the after-hours number / 911 should there be a change in any of these risk factors.     Appearance:   Appropriate    Eye Contact:   Good    Psychomotor Behavior: Normal    Attitude:   Cooperative    Orientation:   All   Speech    Rate / Production: Normal     Volume:  Normal    Mood:    Anxious    Affect:    Appropriate  Bright  Worrisome    Thought Content:  Clear    Thought Form:  Coherent  Goal Directed  Logical    Insight:    Good      Medication Review:   No current psychiatric medications prescribed     Medication Compliance:   NA     Changes in Health Issues:   None reported     Chemical Use Review:   Substance Use: Chemical use reviewed, no active concerns identified      Tobacco Use: No current tobacco use.       Collateral Reports Completed:   Not Applicable    PLAN: (Client Tasks / Therapist Tasks / Other)  Client will use assertiveness skills to have the difficult conversation with her  about what is going on with his Parkinson's and how to go forward with his treatment. Client will also take the lead and initiate more activities together outside of the home for physical activity or more activity with safe tasks at home.         Kavon Pabon, CELIA, LICSW                                                      "    ________________________________________________________________________    Treatment Plan    Client's Name: Chasity Hodgson  YOB: 1945    Date: 9/26/2018    DSM-V Diagnoses: Adjustment Disorders  309.24 (F43.22) With anxiety  Psychosocial & Contextual Factors: Client is experiencing symptoms of anxiety related to feeling overwhelmed by life changes following her 's diagnosis of Parkinson's disease.   WHODAS 2.0 (12 item) 18.75% on 9/19/2018     Referral / Collaboration:  Referral to another professional/service is not indicated at this time.    Anticipated number of session or this episode of care: 6-8      MeasurableTreatment Goal(s) related to diagnosis / functional impairment(s)  Goal 1: Client will improve boundaries to help diminish symptoms of anxiety by 50% for a minimum of 4 weeks.     Learn more about Parkinson's. Take some time for my self. Get my house cleaned up and ready for the holidays.\"     Objective #A (Client Action)      Client will compile a list of boundaries that they would like to set with others: , self  Learning to fight fair, learning to identify positive and negative communication patterns           Status: New - Date: 10/11/2018     Intervention(s)  Therapist will teach about healthy boundaries. structure, saying \"no\", advocating, identifying and establishing appropriate roles, rules, expectations.    Objective #B  Client will practice setting boundaries daily times in the next 8 weeks.                    Status: New - Date: 10/11/2018    Intervention(s)  Therapist will assign homework to track the use of healthy boundaries and outcome of establishing relational change  role-play effective communication skills and conflict management    Objective #C  Client will learn & utilize at least 3 assertive communication skills weekly.  Status: New - Date: 10/11/2018    Intervention(s)  teach assertiveness skills. aggressive/passive/assertive, impulsive control, " reactive vs sensitive, exposure to uncomfortable conversation.  Therapist will role-play assertiveness skills    Objective #D  Client will use at least 4 coping skills for anxiety management in the next 10 weeks.   Status: New - Date: 10/11/2018    Intervention(s)  Therapist will assign homework worksheet to challenge anxious thoughts, values and barriers, rational counter-statements  teach emotional regulation skills. deep breathing, grounding, progressive muscle relaxation, TIP skills    Objective #E  Client will use thought-stopping strategy daily to reduce intrusive thoughts.  Status: New - Date: 10/11/2018    Intervention(s)  provide safe space to address hx of presenting problem and root cause  Teach mindfulness practices, silent observer, ACT metaphors and managing stressors  Sandtray: exercise to stage presenting problem, reflect, process and problem solve.      Client has reviewed and agreed to the above plan.      CELIA Rodrigues, LICSW  October 25, 2018

## 2018-11-01 ENCOUNTER — OFFICE VISIT (OUTPATIENT)
Dept: FAMILY MEDICINE | Facility: CLINIC | Age: 73
End: 2018-11-01
Payer: MEDICARE

## 2018-11-01 VITALS
OXYGEN SATURATION: 98 % | TEMPERATURE: 97.1 F | BODY MASS INDEX: 39.27 KG/M2 | SYSTOLIC BLOOD PRESSURE: 155 MMHG | HEART RATE: 69 BPM | HEIGHT: 61 IN | DIASTOLIC BLOOD PRESSURE: 60 MMHG | WEIGHT: 208 LBS

## 2018-11-01 DIAGNOSIS — R11.2 NON-INTRACTABLE VOMITING WITH NAUSEA, UNSPECIFIED VOMITING TYPE: ICD-10-CM

## 2018-11-01 DIAGNOSIS — M54.9 RIGHT-SIDED BACK PAIN, UNSPECIFIED BACK LOCATION, UNSPECIFIED CHRONICITY: Primary | ICD-10-CM

## 2018-11-01 LAB
ALBUMIN UR-MCNC: NEGATIVE MG/DL
AMORPH CRY #/AREA URNS HPF: ABNORMAL /HPF
APPEARANCE UR: CLEAR
BACTERIA #/AREA URNS HPF: ABNORMAL /HPF
BILIRUB UR QL STRIP: NEGATIVE
COLOR UR AUTO: YELLOW
GLUCOSE UR STRIP-MCNC: NEGATIVE MG/DL
HGB UR QL STRIP: NEGATIVE
KETONES UR STRIP-MCNC: NEGATIVE MG/DL
LEUKOCYTE ESTERASE UR QL STRIP: ABNORMAL
NITRATE UR QL: NEGATIVE
NON-SQ EPI CELLS #/AREA URNS LPF: ABNORMAL /LPF
PH UR STRIP: 6.5 PH (ref 5–7)
RBC #/AREA URNS AUTO: ABNORMAL /HPF
SOURCE: ABNORMAL
SP GR UR STRIP: 1.02 (ref 1–1.03)
UROBILINOGEN UR STRIP-ACNC: 0.2 EU/DL (ref 0.2–1)
WBC #/AREA URNS AUTO: ABNORMAL /HPF

## 2018-11-01 PROCEDURE — 81001 URINALYSIS AUTO W/SCOPE: CPT | Performed by: NURSE PRACTITIONER

## 2018-11-01 PROCEDURE — 99214 OFFICE O/P EST MOD 30 MIN: CPT | Performed by: NURSE PRACTITIONER

## 2018-11-01 NOTE — PATIENT INSTRUCTIONS
Try your reglan again     If this doesn't help, then follow up with Dr Pantoja     Work on healthy diet choices and increase the vegetables to half of your plate   Healthy breakfast: eggs, whole wheat toast with wall, avocado     Lets keep an eye on your back pain. If it gets worse, let me know and we can try physical therapy

## 2018-11-01 NOTE — NURSING NOTE
"Chief Complaint   Patient presents with     Right Kidney pain        Initial /60 (BP Location: Left arm, Patient Position: Chair, Cuff Size: Adult Large)  Pulse 69  Temp 97.1  F (36.2  C) (Tympanic)  Ht 5' 1\" (1.549 m)  Wt 208 lb (94.3 kg)  SpO2 98%  BMI 39.3 kg/m2 Estimated body mass index is 39.3 kg/(m^2) as calculated from the following:    Height as of this encounter: 5' 1\" (1.549 m).    Weight as of this encounter: 208 lb (94.3 kg)..    BP completed using cuff size: large  MEDICATIONS REVIEWED  SOCIAL AND FAMILY HX REVIEWED  Daylin Joe CMA  "

## 2018-11-01 NOTE — MR AVS SNAPSHOT
After Visit Summary   11/1/2018    Chasity Hodgson    MRN: [id number]           Patient Information     Date Of Birth          1945        Visit Information        Provider Department      11/1/2018 12:00 PM Dacia Dove APRN CNP [facility]        Today's Diagnoses     Kidney pain    -  1      Care Instructions    Try your reglan again     If this doesn't help, then follow up with Dr Pantoja     Work on healthy diet choices and increase the vegetables to half of your plate   Healthy breakfast: eggs, whole wheat toast with wall, avocado     Lets keep an eye on your back pain. If it gets worse, let me know and we can try physical therapy           Follow-ups after your visit        Your next 10 appointments already scheduled     Nov 06, 2018  4:00 PM CST   Return Visit with CELIA Rodrigues   [facility] ([facility])    [id number] [place] [id number] [phone]              Who to contact     If you have questions or need follow up information about today's clinic visit or your schedule please contact [facility] directly at [phone].  Normal or non-critical lab and imaging results will be communicated to you by [employer], letter or phone within 4 business days after the clinic has received the results. If you do not hear from us within 7 days, please contact the clinic through [employer] or phone. If you have a critical or abnormal lab result, we will notify you by phone as soon as possible.  Submit refill requests through [employer] or call your pharmacy and they will forward the refill request to us. Please allow 3 business days for your refill to be completed.          Additional Information About Your Visit        MyChart Information     [employer] gives you secure access to your electronic health record. If you see a primary care provider, you can also send messages to your care team and make appointments. If you have  "questions, please call your primary care clinic.  If you do not have a primary care provider, please call [phone] and they will assist you.        Care EveryWhere ID     This is your Care EveryWhere ID. This could be used by other organizations to access your [facility] medical records  [id number]        Your Vitals Were     Pulse Temperature Height Pulse Oximetry BMI (Body Mass Index)       69 97.1  F (36.2  C) (Tympanic) 5' 1\" (1.549 m) 98% 39.3 kg/m2        Blood Pressure from Last 3 Encounters:   11/01/18 155/60   10/23/18 167/65   10/08/18 177/64    Weight from Last 3 Encounters:   11/01/18 208 lb (94.3 kg)   10/23/18 208 lb (94.3 kg)   10/08/18 208 lb (94.3 kg)              We Performed the Following     *UA reflex to Microscopic and Culture ([place] [facility] (except [place] and [place])     Urine Microscopic        Primary Care Provider Office Phone # Fax #    Shola Benoit [phone] [address] [facility] [address]  [facility] [address]        Equal Access to Services     JONA ROBB AH: Hadii domi ku tiffanyo Sobhavna, waaxda luqadaha, qaybta kaalmada adejosé miguelyada, jose kim. So [facility] [phone].    ATENCIÓN: Si habla español, tiene a modi disposición servicios gratuitos de asistencia lingüística. [facility] [phone].    We comply with applicable federal civil rights laws and [place] laws. We do not discriminate on the basis of race, color, national origin, age, disability, sex, sexual orientation, or gender identity.            Thank you!     Thank you for choosing [facility]  for your care. Our goal is always to provide you with excellent care. Hearing back from our patients is one way we can continue to improve our services. Please take a few minutes to complete the written survey that you may receive in the mail after your visit with us. Thank you!             Your Updated Medication List - Protect others around you: Learn " how to safely use, store and throw away your medicines at www.disposemymeds.org.          This list is accurate as of 11/1/18 12:54 PM.  Always use your most recent med list.                   Brand Name Dispense Instructions for use Diagnosis    ascorbic acid 1000 MG Tabs    vitamin C     Take 1,000 mg by mouth daily        ASTEPRO NA           atenolol 50 MG tablet    TENORMIN    180 tablet    Take 1 tablet (50 mg) by mouth 2 times daily    Essential hypertension       BIOTIN PO      Take 1,000 mcg by mouth        blood glucose monitoring test strip    no brand specified    1 Box    Use to test blood sugar 1 times daily or as directed.    Type 2 diabetes mellitus with other specified complication (H)       clotrimazole 1 % cream    LOTRIMIN     Apply topically daily        cycloSPORINE 0.05 % ophthalmic emulsion    RESTASIS     1 drop 2 times daily        diphenhydrAMINE-acetaminophen  MG tablet    TYLENOL PM     Take 1 tablet by mouth At Bedtime Reported on 3/20/2017        famotidine 40 MG tablet    PEPCID     Take 40 mg by mouth At Bedtime        fenofibrate 145 MG tablet     90 tablet    Take 1 tablet (145 mg) by mouth daily    CARDIOVASCULAR SCREENING; LDL GOAL LESS THAN 130       ferrous sulfate 325 (65 Fe) MG tablet    IRON     Take 325 mg by mouth daily (with breakfast)        fexofenadine 180 MG tablet    ALLEGRA    120    Take 180 mg by mouth daily.        fluticasone 50 MCG/ACT spray    FLONASE    1 Bottle    Spray 2 sprays in nostril daily 2 sprays in each nostril qd    Seasonal allergic rhinitis due to other allergic trigger       gabapentin 100 MG capsule    NEURONTIN    90 capsule    Take 1 capsule (100 mg) by mouth every evening as needed    Poliomyelitis       hydrochlorothiazide 25 MG tablet    HYDRODIURIL    90 tablet    Take 1 tablet (25 mg) by mouth daily    Essential hypertension       lisinopril 20 MG tablet    PRINIVIL/ZESTRIL    90 tablet    Take 1 tablet (20 mg) by mouth daily     Essential hypertension       metoclopramide 10 MG tablet    REGLAN     Take 1-2 tabs as needed        multivitamin per tablet     100    ONE DAILY        nitroGLYcerin 0.4 MG sublingual tablet    NITROSTAT    25 tablet    Place 1 tablet (0.4 mg) under the tongue every 5 minutes as needed for chest pain (no more than 3 in one hour; after 3rd, call 911.)    Atypical chest pain       nystatin cream    MYCOSTATIN     Apply topically daily as needed        nystatin-triamcinolone cream    MYCOLOG II     Apply topically daily as needed        ondansetron 4 MG tablet    ZOFRAN     Take 1 tablet by mouth every 6 hours as needed Reported on 3/20/2017        order for DME     1 Units    Equipment being ordered: Oral appliance for sleep apnea    ANNETTE (obstructive sleep apnea)       order for DME     2 each    Equipment being ordered: Compression stockings - Knee High; 20-30 mmHg compression    Insufficiency, arterial, peripheral (H)       order for DME     1 each    Donut Pillow    Coccydynia       pantoprazole 40 MG EC tablet    PROTONIX     Take 40 mg by mouth 2 times daily        SB LOW DOSE ASA EC 81 MG EC tablet   Generic drug:  aspirin      Take 81 mg by mouth daily        sitagliptin 50 MG tablet    JANUVIA     Take 50 mg by mouth daily        SYNTHROID 112 MCG tablet   Generic drug:  levothyroxine      Take 112 mcg by mouth daily Take 112 mcg daily except for Friday takes an additional 56 mcg.  Brand name Synthroid        TOUJEO SOLOSTAR 300 UNIT/ML injection   Generic drug:  insulin glargine U-300      Inject 34 Units Subcutaneous every morning        triamcinolone 0.1 % cream    KENALOG     Apply topically daily        VITAMIN D-3 PO      Take 2 tablets by mouth

## 2018-11-01 NOTE — PROGRESS NOTES
"HPI      SUBJECTIVE:   Chasity Hodgson is a 73 year old female who presents to clinic today for the following health issues:      Right Side pain      Duration: 1 month    Description (location/character/radiation): Right side pain with nausea and vomiting and swollen hands    Intensity:  moderate, severe    Accompanying signs and symptoms: none    History (similar episodes/previous evaluation): None    Precipitating or alleviating factors: None    Therapies tried and outcome: Zofran     Yesterday has severe right sided back pain while running errands lasting 3-4 hours with nausea  Pain and nausea subsided on its own.  Today reports having tenderness in the right back still and nausea   Denies hematuria and change in color of urine  No fevers but reports chills and hot flashes    Monday reports feeling nauseated when she went to bed and woke up 3-4 hours later and vomited.   Reports having a lot of nausea and vomiting  Very crampy and \"full\" feeling   Reports poor appetite and has to eat small meals to minimize nausea  Takes Ondansetron PRN for nausea with some improvement  Prescribed Reglan but has not taken it in a year because of concerning side effects  Denies chest pain or increased shortness of breath  Reports fluctuation of upper and lower extremity swelling that improves throughout the day  No changes in colostomy output    Recent ER visit in Oct for concerns of bowel obstruction which was negative      Past Medical History:   Diagnosis Date     Abdominal adhesions 1984, 96,99    s/p lysis     Allergic rhinitis, cause unspecified      Carotid stenosis      CPAP (continuous positive airway pressure) dependence      Diabetic gastroparesis (H)      Diet-controlled type 2 diabetes mellitus (H)      DVT of axillary vein, acute right (H)      Fibromyalgia      Gastro-oesophageal reflux disease      Hernia of unspecified site of abdominal cavity without mention of obstruction or gangrene     bilateral     HTN " (hypertension)      Hypertriglyceridemia      Obstructive sleep apnea      ANNETTE (obstructive sleep apnea)      Papillary carcinoma of thyroid (H)     s/p thyroidectomy - Ruegemer     PE (pulmonary embolism)      Poliomyelitis     poor circulation right leg     Postsurgical hypothyroidism     s/p papillary thryoid cancer - Ruegemer     Pulmonary embolism (H)      Rosacea      S/P carpal tunnel release     bilateral     S/P hardware removal 01/2014    still with lingering foot pain     S/P shoulder surgery     bilateral     Septic joint (H)     right knee     Venous insufficiency      Venous thrombosis 1999    right axillary vein     Family History   Problem Relation Age of Onset     Arthritis Mother      Hypertension Mother      Cerebrovascular Disease Mother      Obesity Mother      HEART DISEASE Mother      MI's     Hypertension Father      Respiratory Father      Adult RDS     Diabetes Father      adult     Arthritis Sister      Cancer Sister      Arthritis Sister      Hypertension Sister      Cancer Sister      colon polup     HEART DISEASE Brother      MI at 54     Hypertension Sister      Lipids Brother      Hypertension Brother      Lipids Sister      Obesity Sister      Obesity Maternal Grandmother      Skin Cancer Maternal Grandmother      skin cancer unknown     Cancer Maternal Grandmother      unknown skin cancer on face     Past Surgical History:   Procedure Laterality Date     AMPUTATE TOE(S)  3/15/2012    Procedure:AMPUTATE TOE(S); Surgeon:RUBEN MOSES; Location:[facility] OR     APPENDECTOMY  1972     ARTHRODESIS FOOT  3/15/2012    Procedure:ARTHRODESIS FOOT; RIGHT TRIPLE ARTHRODESIS, FIFTH TOE AMPUTATION, LATERAL LIGAMENT RECONSTRUCTION, TENDON TRANSFER AND RELEASE [MINI C-ARM, ARTHREX 4.5 AND 6.7 STAPLES, BIOCOMPOSITE TENODESIS SCREWS]; Surgeon:RUBEN MOSES; Location: OR     C FREEING BOWEL ADHESION,ENTEROLYSIS      1986, 1996, 1999     C NONSPECIFIC PROCEDURE      throidectomy     C  TOTAL ABDOM HYSTERECTOMY  1980    + BSO     CHOLECYSTECTOMY       COLOSTOMY  2/7/2012    Procedure:COLOSTOMY; CREATION OF SIGMOID COLOSTOMY AND EXTENSIVE  LYSIS OF ADHESIONS; Surgeon:MONTSERRAT BENDER; Location: OR     GI SURGERY      weakened rectal sphincter with artificial stimulator     LAPAROTOMY, LYSIS ADHESIONS, COMBINED  2/7/2012    Procedure:COMBINED LAPAROTOMY, LYSIS ADHESIONS; Surgeon:MONTSERRAT BENDER; Location:[facility] OR     RELEASE TENDON FOOT  3/15/2012    Procedure:RELEASE TENDON FOOT; Surgeon:SUKHDEEP METZ; Location: OR     REMOVE HARDWARE FOOT  12/13/2012    Procedure: REMOVE HARDWARE FOOT;  RIGHT FOOT REMOVAL OF HARDWARE;  Surgeon: Sukhdeep Metz MD;  Location:  OR     Social History   Substance Use Topics     Smoking status: Never Smoker     Smokeless tobacco: Never Used     Alcohol use No      Comment: very rare     Current Outpatient Prescriptions   Medication Sig Dispense Refill     ascorbic acid (VITAMIN C) 1000 MG TABS Take 1,000 mg by mouth daily       aspirin (SB LOW DOSE ASA EC) 81 MG EC tablet Take 81 mg by mouth daily       atenolol (TENORMIN) 50 MG tablet Take 1 tablet (50 mg) by mouth 2 times daily 180 tablet 3     Azelastine HCl (ASTEPRO NA)        BIOTIN PO Take 1,000 mcg by mouth       blood glucose monitoring (NO BRAND SPECIFIED) test strip Use to test blood sugar 1 times daily or as directed. 1 Box 6     Cholecalciferol (VITAMIN D-3 PO) Take 2 tablets by mouth       clotrimazole (LOTRIMIN) 1 % cream Apply topically daily       cycloSPORINE (RESTASIS) 0.05 % ophthalmic emulsion 1 drop 2 times daily       diphenhydrAMINE-acetaminophen (TYLENOL PM)  MG tablet Take 1 tablet by mouth At Bedtime Reported on 3/20/2017       famotidine (PEPCID) 40 MG tablet Take 40 mg by mouth At Bedtime       fenofibrate 145 MG tablet Take 1 tablet (145 mg) by mouth daily 90 tablet 1     ferrous sulfate (IRON) 325 (65 FE) MG tablet Take 325 mg by mouth daily (with  breakfast)       fexofenadine (ALLEGRA) 180 MG tablet Take 180 mg by mouth daily. 120 0     fluticasone (FLONASE) 50 MCG/ACT spray Spray 2 sprays in nostril daily 2 sprays in each nostril qd 1 Bottle 0     gabapentin (NEURONTIN) 100 MG capsule Take 1 capsule (100 mg) by mouth every evening as needed 90 capsule 3     hydrochlorothiazide (HYDRODIURIL) 25 MG tablet Take 1 tablet (25 mg) by mouth daily 90 tablet 3     insulin glargine U-300 (TOUJEO SOLOSTAR) 300 UNIT/ML injection Inject 34 Units Subcutaneous every morning        levothyroxine (SYNTHROID) 112 MCG tablet Take 112 mcg by mouth daily Take 112 mcg daily except for Friday takes an additional 56 mcg.  Brand name Synthroid       lisinopril (PRINIVIL/ZESTRIL) 20 MG tablet Take 1 tablet (20 mg) by mouth daily 90 tablet 2     metoclopramide (REGLAN) 10 MG tablet Take 1-2 tabs as needed       MULTIVITAMINS OR TABS ONE DAILY 100 3     nitroGLYcerin (NITROSTAT) 0.4 MG sublingual tablet Place 1 tablet (0.4 mg) under the tongue every 5 minutes as needed for chest pain (no more than 3 in one hour; after 3rd, call 911.) 25 tablet 3     nystatin (MYCOSTATIN) cream Apply topically daily as needed        nystatin-triamcinolone (MYCOLOG II) cream Apply topically daily as needed       ondansetron (ZOFRAN) 4 MG tablet Take 1 tablet by mouth every 6 hours as needed Reported on 3/20/2017       order for DME Equipment being ordered: Compression stockings - Knee High; 20-30 mmHg compression 2 each 0     order for DME Donut Pillow 1 each 0     order for DME Equipment being ordered: Oral appliance for sleep apnea 1 Units 0     pantoprazole (PROTONIX) 40 MG EC tablet Take 40 mg by mouth 2 times daily       sitagliptin (JANUVIA) 50 MG tablet Take 50 mg by mouth daily       triamcinolone (KENALOG) 0.1 % cream Apply topically daily       Allergies   Allergen Reactions     Nsaids Difficulty breathing     Increased creatinine     Toradol Difficulty breathing     Shortness of breath      "Celecoxib Itching and Rash     Codeine Itching     With higher doses     Crestor [Rosuvastatin] Muscle Pain (Myalgia)     No Clinical Screening - See Comments Itching     Fragrance     Vioxx Other (See Comments)     Heart races     Conjugated Estrogens Rash     Sulfa Drugs Rash       Reviewed and updated as needed this visit by clinical staff and provider     ROS  Detailed as above    /60 (BP Location: Left arm, Patient Position: Chair, Cuff Size: Adult Large)  Pulse 69  Temp 97.1  F (36.2  C) (Tympanic)  Ht 5' 1\" (1.549 m)  Wt 208 lb (94.3 kg)  SpO2 98%  BMI 39.3 kg/m2      Physical Exam   Constitutional: She is oriented to person, place, and time and well-developed, well-nourished, and in no distress.   HENT:   Head: Normocephalic.   Neck: Normal range of motion.   Cardiovascular: Normal rate and regular rhythm.    Pulmonary/Chest: Effort normal and breath sounds normal.   Abdominal: Soft. Bowel sounds are normal.   Scattered scar tissue on palpation   Musculoskeletal:   Right lateral mid back tenderness on palpation. Left upper back atrophy    Neurological: She is alert and oriented to person, place, and time.   Uses cane for assistance   Skin: Skin is warm and intact.   Psychiatric: Mood and affect normal.   Vitals reviewed.      Assessment and Plan:       ICD-10-CM    1. Right-sided back pain, unspecified back location, unspecified chronicity M54.9 *UA reflex to Microscopic and Culture ([place] [facility] [place] [facility] (except [place] and [place])     Urine Microscopic   2. Non-intractable vomiting with nausea, unspecified vomiting type R11.2      Right sided back pain may because muscular in nature. UA negative for infection. Encouraged modifiable lifestyle changes to decrease weight. May consider PT if pain does not improve with changes.     Nausea and vomiting likely related to uncontrolled gastroparesis. Encouraged consistent use of Metroclopramide as well as improving diet.  If no improvement, " may consider follow up with Dr. Pantoja. Call if symptoms worsen or do not improve. Follow up with Dr. Benoit for recheck and for recheck of BP. BP may be falsely elevated today d/t pain         Pt seen in conjunction with Bouchra Santoyo NP Student     Dacia Dove APRN, CNP  [facility]

## 2018-11-02 ENCOUNTER — TELEPHONE (OUTPATIENT)
Dept: FAMILY MEDICINE | Facility: CLINIC | Age: 73
End: 2018-11-02

## 2018-11-02 DIAGNOSIS — K31.84 GASTROPARESIS: ICD-10-CM

## 2018-11-02 RX ORDER — METOCLOPRAMIDE 5 MG/1
5 TABLET ORAL 2 TIMES DAILY PRN
Qty: 120 TABLET | Refills: 0 | Status: SHIPPED | OUTPATIENT
Start: 2018-11-02 | End: 2018-11-02

## 2018-11-02 RX ORDER — METOCLOPRAMIDE 5 MG/1
5-10 TABLET ORAL 2 TIMES DAILY PRN
Qty: 120 TABLET | Refills: 0 | Status: SHIPPED | OUTPATIENT
Start: 2018-11-02 | End: 2022-03-31

## 2018-11-02 NOTE — TELEPHONE ENCOUNTER
Reason for Call:  Medication or medication refill:    Do you use a [facility]?  Name of the pharmacy and phone number for the current request:     [facility] [address] [address]      Name of the medication requested: Metoclopramide- 10 MG     Other request: Pt states that NP Petty offered Rx but she had some at home but she realized they are      Can we leave a detailed message on this number? YES    Phone number patient can be reached at: Home number on file [phone] (home)    Best Time: any    Call taken on 2018 at 1:07 PM by Esther Sheikh

## 2018-11-02 NOTE — TELEPHONE ENCOUNTER
Reason for Call:  Other appointment    Detailed comments: pt states  said Rosalia called pt. No note in chart regarding this. Pt was not sure what this was regarding apart from her appt yesterday. I reached out via [employer] to Kody and Rosalia, but they are with patients. Please call patient back    Phone Number Patient can be reached at: Home number on file [phone] (home)    Best Time: amytime    Can we leave a detailed message on this number? YES    Call taken on 11/2/2018 at 11:43 AM by Eden Barreto

## 2018-11-02 NOTE — TELEPHONE ENCOUNTER
Reason for Call:  Medication Question    Other request: Metoclopramide there is 2 sets of directions on this  Please send New RX to Pharmacy or call with correct SIG    Can we leave a detailed message on this number? YES        Call taken on 11/2/2018 at 3:03 PM by Jimmie Gabriel

## 2018-11-05 ENCOUNTER — OFFICE VISIT (OUTPATIENT)
Dept: FAMILY MEDICINE | Facility: CLINIC | Age: 73
End: 2018-11-05
Payer: MEDICARE

## 2018-11-05 VITALS
WEIGHT: 211.6 LBS | HEIGHT: 61 IN | DIASTOLIC BLOOD PRESSURE: 80 MMHG | BODY MASS INDEX: 39.95 KG/M2 | SYSTOLIC BLOOD PRESSURE: 139 MMHG | TEMPERATURE: 98.2 F | OXYGEN SATURATION: 98 % | HEART RATE: 92 BPM

## 2018-11-05 DIAGNOSIS — I10 BENIGN ESSENTIAL HYPERTENSION: ICD-10-CM

## 2018-11-05 DIAGNOSIS — K31.84 DIABETIC GASTROPARESIS (H): Primary | ICD-10-CM

## 2018-11-05 DIAGNOSIS — Z79.4 TYPE 2 DIABETES MELLITUS WITH OTHER SPECIFIED COMPLICATION, WITH LONG-TERM CURRENT USE OF INSULIN (H): ICD-10-CM

## 2018-11-05 DIAGNOSIS — C73 PAPILLARY CARCINOMA OF THYROID (H): ICD-10-CM

## 2018-11-05 DIAGNOSIS — E11.69 TYPE 2 DIABETES MELLITUS WITH OTHER SPECIFIED COMPLICATION, WITH LONG-TERM CURRENT USE OF INSULIN (H): ICD-10-CM

## 2018-11-05 DIAGNOSIS — Z93.3 COLOSTOMY IN PLACE (H): ICD-10-CM

## 2018-11-05 DIAGNOSIS — E11.43 DIABETIC GASTROPARESIS (H): Primary | ICD-10-CM

## 2018-11-05 LAB — HBA1C MFR BLD: 8.1 % (ref 0–5.6)

## 2018-11-05 PROCEDURE — 99214 OFFICE O/P EST MOD 30 MIN: CPT | Performed by: INTERNAL MEDICINE

## 2018-11-05 PROCEDURE — 80048 BASIC METABOLIC PNL TOTAL CA: CPT | Performed by: INTERNAL MEDICINE

## 2018-11-05 PROCEDURE — 36415 COLL VENOUS BLD VENIPUNCTURE: CPT | Performed by: INTERNAL MEDICINE

## 2018-11-05 PROCEDURE — 83036 HEMOGLOBIN GLYCOSYLATED A1C: CPT | Performed by: INTERNAL MEDICINE

## 2018-11-05 NOTE — MR AVS SNAPSHOT
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   Plan:              Follow-ups after your visit        Your next 10 appointments already scheduled     Nov 06, 2018  4:00 PM CST   Return Visit with CELIA Rodrigues   [facility] ([facility]    [id number] [place] [id number] [phone]              Who to contact     If you have questions or need follow up information about today's clinic visit or your schedule please contact [facility] directly at [phone].  Normal or non-critical lab and imaging results will be communicated to you by MyChart, letter or phone within 4 business days after the clinic has received the results. If you do not hear from us within 7 days, please contact the  "clinic through Bruxiet or phone. If you have a critical or abnormal lab result, we will notify you by phone as soon as possible.  Submit refill requests through FlowJob or call your pharmacy and they will forward the refill request to us. Please allow 3 business days for your refill to be completed.          Additional Information About Your Visit        ReaLynchart Information     FlowJob gives you secure access to your electronic health record. If you see a primary care provider, you can also send messages to your care team and make appointments. If you have questions, please call your primary care clinic.  If you do not have a primary care provider, please call [phone] and they will assist you.        Care EveryWhere ID     This is your Care EveryWhere ID. This could be used by other organizations to access your [facility] medical records  [id number]        Your Vitals Were     Pulse Temperature Height Pulse Oximetry BMI (Body Mass Index)       92 98.2  F (36.8  C) (Tympanic) 5' 1\" (1.549 m) 98% 39.98 kg/m2        Blood Pressure from Last 3 Encounters:   11/05/18 139/80   11/01/18 155/60   10/23/18 167/65    Weight from Last 3 Encounters:   11/05/18 211 lb 9.6 oz (96 kg)   11/01/18 208 lb (94.3 kg)   10/23/18 208 lb (94.3 kg)              We Performed the Following     Basic metabolic panel     Hemoglobin A1c        Primary Care Provider Office Phone # Fax #    Shola Leonides Benoit [phone] [address]  [place] [address]        Equal Access to Services     [facility]: Hadii aad ku hadasho Soomaali, waaxda luqadaha, qaybta kaalmada adeegizabel, jose baldwin . So [facility] [phone].    ATENCIÓN: Si habla español, tiene a modi disposición servicios gratuitos de asistencia lingüística. Llame al [phone].    We comply with applicable federal civil rights laws and [place] laws. We do not discriminate on the basis of race, color, national origin, " age, disability, sex, sexual orientation, or gender identity.            Thank you!     Thank you for choosing [facility]  for your care. Our goal is always to provide you with excellent care. Hearing back from our patients is one way we can continue to improve our services. Please take a few minutes to complete the written survey that you may receive in the mail after your visit with us. Thank you!             Your Updated Medication List - Protect others around you: Learn how to safely use, store and throw away your medicines at www.disposemymeds.org.          This list is accurate as of 11/5/18  2:04 PM.  Always use your most recent med list.                   Brand Name Dispense Instructions for use Diagnosis    ascorbic acid 1000 MG Tabs    vitamin C     Take 1,000 mg by mouth daily        ASTEPRO NA           atenolol 50 MG tablet    TENORMIN    180 tablet    Take 1 tablet (50 mg) by mouth 2 times daily    Essential hypertension       BIOTIN PO      Take 1,000 mcg by mouth        blood glucose monitoring test strip    no brand specified    1 Box    Use to test blood sugar 1 times daily or as directed.    Type 2 diabetes mellitus with other specified complication (H)       clotrimazole 1 % cream    LOTRIMIN     Apply topically daily        cycloSPORINE 0.05 % ophthalmic emulsion    RESTASIS     1 drop 2 times daily        diphenhydrAMINE-acetaminophen  MG tablet    TYLENOL PM     Take 1 tablet by mouth At Bedtime Reported on 3/20/2017        famotidine 40 MG tablet    PEPCID     Take 40 mg by mouth At Bedtime        fenofibrate 145 MG tablet     90 tablet    Take 1 tablet (145 mg) by mouth daily    CARDIOVASCULAR SCREENING; LDL GOAL LESS THAN 130       ferrous sulfate 325 (65 Fe) MG tablet    IRON     Take 325 mg by mouth daily (with breakfast)        fexofenadine 180 MG tablet    ALLEGRA    120    Take 180 mg by mouth daily.        fluticasone 50 MCG/ACT spray    FLONASE    1 Bottle    Spray  2 sprays in nostril daily 2 sprays in each nostril qd    Seasonal allergic rhinitis due to other allergic trigger       gabapentin 100 MG capsule    NEURONTIN    90 capsule    Take 1 capsule (100 mg) by mouth every evening as needed    Poliomyelitis       hydrochlorothiazide 25 MG tablet    HYDRODIURIL    90 tablet    Take 1 tablet (25 mg) by mouth daily    Essential hypertension       lisinopril 20 MG tablet    PRINIVIL/ZESTRIL    90 tablet    Take 1 tablet (20 mg) by mouth daily    Essential hypertension       metoclopramide 5 MG tablet    REGLAN    120 tablet    Take 1-2 tablets (5-10 mg) by mouth 2 times daily as needed a    Gastroparesis       multivitamin per tablet     100    ONE DAILY        nitroGLYcerin 0.4 MG sublingual tablet    NITROSTAT    25 tablet    Place 1 tablet (0.4 mg) under the tongue every 5 minutes as needed for chest pain (no more than 3 in one hour; after 3rd, call 911.)    Atypical chest pain       nystatin cream    MYCOSTATIN     Apply topically daily as needed        nystatin-triamcinolone cream    MYCOLOG II     Apply topically daily as needed        ondansetron 4 MG tablet    ZOFRAN     Take 1 tablet by mouth every 6 hours as needed Reported on 3/20/2017        order for DME     1 Units    Equipment being ordered: Oral appliance for sleep apnea    ANNETTE (obstructive sleep apnea)       order for DME     2 each    Equipment being ordered: Compression stockings - Knee High; 20-30 mmHg compression    Insufficiency, arterial, peripheral (H)       order for DME     1 each    Donut Pillow    Coccydynia       pantoprazole 40 MG EC tablet    PROTONIX     Take 40 mg by mouth 2 times daily        SB LOW DOSE ASA EC 81 MG EC tablet   Generic drug:  aspirin      Take 81 mg by mouth daily        sitagliptin 50 MG tablet    JANUVIA     Take 50 mg by mouth daily        SYNTHROID 112 MCG tablet   Generic drug:  levothyroxine      Take 112 mcg by mouth daily Take 112 mcg daily except for Friday takes an  additional 56 mcg.  Brand name Synthroid        TOUJEO SOLOSTAR 300 UNIT/ML injection   Generic drug:  insulin glargine U-300      Inject 34 Units Subcutaneous every morning        triamcinolone 0.1 % cream    KENALOG     Apply topically daily        VITAMIN D-3 PO      Take 2 tablets by mouth

## 2018-11-05 NOTE — PATIENT INSTRUCTIONS
(E11.69) Type 2 diabetes mellitus with other specified complication, with long-term current use of insulin (H)  (primary encounter diagnosis)  Comment: Plan to follow up in endocrinology.  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   Plan:    3

## 2018-11-06 ENCOUNTER — OFFICE VISIT (OUTPATIENT)
Dept: PSYCHOLOGY | Facility: CLINIC | Age: 73
End: 2018-11-06
Payer: MEDICARE

## 2018-11-06 DIAGNOSIS — F43.22 ADJUSTMENT DISORDER WITH ANXIETY: Primary | ICD-10-CM

## 2018-11-06 LAB
ANION GAP SERPL CALCULATED.3IONS-SCNC: 10 MMOL/L (ref 3–14)
BUN SERPL-MCNC: 27 MG/DL (ref 7–30)
CALCIUM SERPL-MCNC: 9.5 MG/DL (ref 8.5–10.1)
CHLORIDE SERPL-SCNC: 102 MMOL/L (ref 94–109)
CO2 SERPL-SCNC: 27 MMOL/L (ref 20–32)
CREAT SERPL-MCNC: 1.17 MG/DL (ref 0.52–1.04)
GFR SERPL CREATININE-BSD FRML MDRD: 45 ML/MIN/1.7M2
GLUCOSE SERPL-MCNC: 248 MG/DL (ref 70–99)
POTASSIUM SERPL-SCNC: 4.6 MMOL/L (ref 3.4–5.3)
SODIUM SERPL-SCNC: 139 MMOL/L (ref 133–144)

## 2018-11-06 PROCEDURE — 90834 PSYTX W PT 45 MINUTES: CPT | Performed by: MARRIAGE & FAMILY THERAPIST

## 2018-11-06 ASSESSMENT — ANXIETY QUESTIONNAIRES
GAD7 TOTAL SCORE: 0
2. NOT BEING ABLE TO STOP OR CONTROL WORRYING: NOT AT ALL
1. FEELING NERVOUS, ANXIOUS, OR ON EDGE: NOT AT ALL
6. BECOMING EASILY ANNOYED OR IRRITABLE: NOT AT ALL
IF YOU CHECKED OFF ANY PROBLEMS ON THIS QUESTIONNAIRE, HOW DIFFICULT HAVE THESE PROBLEMS MADE IT FOR YOU TO DO YOUR WORK, TAKE CARE OF THINGS AT HOME, OR GET ALONG WITH OTHER PEOPLE: NOT DIFFICULT AT ALL
5. BEING SO RESTLESS THAT IT IS HARD TO SIT STILL: NOT AT ALL
7. FEELING AFRAID AS IF SOMETHING AWFUL MIGHT HAPPEN: NOT AT ALL
3. WORRYING TOO MUCH ABOUT DIFFERENT THINGS: NOT AT ALL

## 2018-11-06 ASSESSMENT — PATIENT HEALTH QUESTIONNAIRE - PHQ9
5. POOR APPETITE OR OVEREATING: NOT AT ALL
SUM OF ALL RESPONSES TO PHQ QUESTIONS 1-9: 1

## 2018-11-06 NOTE — PROGRESS NOTES
Gerard Cochran,    I have had the opportunity to review your recent results and an interpretation is as follows:  Your basic metabolic panel returned stable  Your hemoglobin A1c also shows stable average blood glucose, but higher than previous - I would recommend a follow up in endocrinology     Sincerely,  Shola Benoit MD

## 2018-11-06 NOTE — PROGRESS NOTES
"                                             Progress Note    Client Name: Chasity Hodgson  Date: 11/6/2018         Service Type: Individual      Session Start Time: 4pm  Session End Time: 4:45am      Session Length: 45 minutes     Session #: 5     Attendees: Client attended alone    Treatment Plan Last Reviewed: 10/11/2018  PHQ-9 / JAYLEN-7 : 11/6/2018     DATA      Progress Since Last Session (Related to Symptoms / Goals / Homework):   Symptoms: No change since last session    Homework: Achieved / completed to satisfaction      Episode of Care Goals: Satisfactory progress - ACTION (Actively working towards change); Intervened by reinforcing change plan / affirming steps taken     Current / Ongoing Stressors and Concerns:   - Sx of anxiety being the primary caretaker of her  with Parkinson's   - Marital distress   - Self-care  Today the client started to share how spread out she feels because of all the things that she takes on. She started to talk about the benefits of having boundaries and saying \"no.\" She will start to prioritize activities and responsibilities as to not feel so overwhelmed. She will also start using a daily planner, change of environment and assertiveness skills to honor her self-care time.       Treatment Objective(s) Addressed in This Session:   identify 2 strategies to more effectively address stressors  Increase interest, engagement, and pleasure in doing things  identify two areas of life that you would like to have improved functioning       Intervention:   CBT: identify patterns and alternatives for desired change  Motivational Interviewing: what can she do in the present to reach an attainable goal  Structural: work to establish a family structure that fits needs and incorporates self-care        ASSESSMENT: Current Emotional / Mental Status (status of significant symptoms):   Risk status (Self / Other harm or suicidal ideation)   Client denies current fears or concerns for personal " "safety.   Client denies current or recent suicidal ideation or behaviors.   Client denies current or recent homicidal ideation or behaviors.   Client denies current or recent self injurious behavior or ideation.   Client denies other safety concerns.   Client Client reports there has been no change in risk factors since their last session.     Client Client reports there has been no change in protective factors since their last session.     A safety and risk management plan has not been developed at this time, however client was given the after-hours number / 911 should there be a change in any of these risk factors.     Appearance:   Appropriate    Eye Contact:   Good    Psychomotor Behavior: Normal    Attitude:   Cooperative    Orientation:   All   Speech    Rate / Production: Normal     Volume:  Normal    Mood:    Anxious    Affect:    Appropriate  Bright  Worrisome    Thought Content:  Clear    Thought Form:  Coherent  Goal Directed  Logical    Insight:    Good      Medication Review:   No current psychiatric medications prescribed     Medication Compliance:   NA     Changes in Health Issues:   None reported     Chemical Use Review:   Substance Use: Chemical use reviewed, no active concerns identified      Tobacco Use: No current tobacco use.       Collateral Reports Completed:   Not Applicable    PLAN: (Client Tasks / Therapist Tasks / Other)  Client will buy herself a daily planner for more realistic scheduling and organization for herself. She will use it to help her set better boundaries on time, assist with saying \"no\" and scheduling self-care.   Next session client will bring in her used daily planner and discuss its impact daily living.         CELIA Rodrigues, LICSW                                                         ________________________________________________________________________    Treatment Plan    Client's Name: Chasity Hodgson  YOB: 1945    Date: 9/26/2018    DSM-V " "Diagnoses: Adjustment Disorders  309.24 (F43.22) With anxiety  Psychosocial & Contextual Factors: Client is experiencing symptoms of anxiety related to feeling overwhelmed by life changes following her 's diagnosis of Parkinson's disease.   WHODAS 2.0 (12 item) 18.75% on 9/19/2018     Referral / Collaboration:  Referral to another professional/service is not indicated at this time.    Anticipated number of session or this episode of care: 6-8      MeasurableTreatment Goal(s) related to diagnosis / functional impairment(s)  Goal 1: Client will improve boundaries to help diminish symptoms of anxiety by 50% for a minimum of 4 weeks.     Learn more about Parkinson's. Take some time for my self. Get my house cleaned up and ready for the holidays.\"     Objective #A (Client Action)      Client will compile a list of boundaries that they would like to set with others: , self  Learning to fight fair, learning to identify positive and negative communication patterns           Status: New - Date: 10/11/2018     Intervention(s)  Therapist will teach about healthy boundaries. structure, saying \"no\", advocating, identifying and establishing appropriate roles, rules, expectations.    Objective #B  Client will practice setting boundaries daily times in the next 8 weeks.                    Status: New - Date: 10/11/2018    Intervention(s)  Therapist will assign homework to track the use of healthy boundaries and outcome of establishing relational change  role-play effective communication skills and conflict management    Objective #C  Client will learn & utilize at least 3 assertive communication skills weekly.  Status: New - Date: 10/11/2018    Intervention(s)  teach assertiveness skills. aggressive/passive/assertive, impulsive control, reactive vs sensitive, exposure to uncomfortable conversation.  Therapist will role-play assertiveness skills    Objective #D  Client will use at least 4 coping skills for anxiety " management in the next 10 weeks.   Status: New - Date: 10/11/2018    Intervention(s)  Therapist will assign homework worksheet to challenge anxious thoughts, values and barriers, rational counter-statements  teach emotional regulation skills. deep breathing, grounding, progressive muscle relaxation, TIP skills    Objective #E  Client will use thought-stopping strategy daily to reduce intrusive thoughts.  Status: New - Date: 10/11/2018    Intervention(s)  provide safe space to address hx of presenting problem and root cause  Teach mindfulness practices, silent observer, ACT metaphors and managing stressors  Lexytray: exercise to stage presenting problem, reflect, process and problem solve.      Client has reviewed and agreed to the above plan.      CELIA Rodrigues, LICSW  November 6, 2018

## 2018-11-07 ASSESSMENT — ANXIETY QUESTIONNAIRES: GAD7 TOTAL SCORE: 0

## 2018-11-29 ENCOUNTER — OFFICE VISIT (OUTPATIENT)
Dept: PSYCHOLOGY | Facility: CLINIC | Age: 73
End: 2018-11-29
Payer: MEDICARE

## 2018-11-29 DIAGNOSIS — F43.22 ADJUSTMENT DISORDER WITH ANXIETY: Primary | ICD-10-CM

## 2018-11-29 PROCEDURE — 90834 PSYTX W PT 45 MINUTES: CPT | Performed by: MARRIAGE & FAMILY THERAPIST

## 2018-11-29 NOTE — PROGRESS NOTES
Discharge Summary  Single Session    Client Name: Chasity Hodgson MRN#: [id number] YOB: 1945    Discharge Date:   November 29, 2018      Service Type: Individual      Session Start Time: 9:05am  Session End Time: 9:55am      Session Length: 45 - 50     Session #: 6     Attendees: Client attended alone    Focus of Treatment Objective(s):  Client's presenting concerns included:                         - Sx of anxiety being the primary caretaker of her  with Parkinson's                        - Marital distress                        - Self-care  Stage of Change at time of Discharge: MAINTENANCE (Working to maintain change, with risk of relapse)    Medication Adherence:  Yes    Chemical Use:  NA    Assessment: Current Emotional / Mental Status (status of significant symptoms):    Risk status (Self / Other harm or suicidal ideation)  Client denies current fears or concerns for personal safety.  Client denies current or recent suicidal ideation or behaviors.  Client denies current or recent homicidal ideation or behaviors.  Client denies current or recent self injurious behavior or ideation.  Client denies other safety concerns.  A safety and risk management plan has not been developed at this time, however client was given the after-hours number should there be a change in any of these risk factors.    Appearance:   Appropriate   Eye Contact:   Good   Psychomotor Behavior: Normal   Attitude:   Cooperative   Orientation:   All  Speech   Rate / Production: Normal    Volume:  Normal   Mood:    Normal  Affect:    Appropriate  Bright   Thought Content:  Clear   Thought Form:  Coherent  Goal Directed  Logical   Insight:   Good     DSM5 Diagnoses: (Sustained by DSM5 Criteria Listed Above)  Diagnoses: Adjustment Disorders  309.24 (F43.22) With anxiety  Psychosocial & Contextual Factors: Client is experiencing symptoms of anxiety related to feeling overwhelmed by life changes  following her 's diagnosis of Parkinson's disease.     Reason for Discharge:  Goals completed      Aftercare Plan:  Client may resume counseling services at any time in the future by calling the [facility] Intake Office, [phone].      CELIA Rodrigues, LICSW                    November 29, 2018

## 2018-11-29 NOTE — Clinical Note
Hi,  Patient has met therapy goals and completed treatment. Patient is being discharged from therapy today 11/29/18.  They can return to counseling with [facility] in the future as needed. Please contact me if you have any questions.  Kavon Pabon MA, LMFT, LICSW

## 2018-12-10 DIAGNOSIS — I10 ESSENTIAL HYPERTENSION: ICD-10-CM

## 2018-12-10 DIAGNOSIS — Z13.6 CARDIOVASCULAR SCREENING; LDL GOAL LESS THAN 130: ICD-10-CM

## 2018-12-11 RX ORDER — HYDROCHLOROTHIAZIDE 25 MG/1
25 TABLET ORAL DAILY
Qty: 90 TABLET | Refills: 3 | Status: SHIPPED | OUTPATIENT
Start: 2018-12-11 | End: 2019-03-29

## 2018-12-11 RX ORDER — FENOFIBRATE 145 MG/1
145 TABLET, COATED ORAL DAILY
Qty: 90 TABLET | Refills: 0 | Status: SHIPPED | OUTPATIENT
Start: 2018-12-11 | End: 2019-03-05

## 2018-12-11 NOTE — TELEPHONE ENCOUNTER
"Fenofibrate:  Prescription approved per [facility] Refill Protocol.    Hydrochlorothiazide:   Routing refill request to provider for review/approval because:  Labs out of range:  Creatinine    Angélica DORADO RN    Requested Prescriptions   Pending Prescriptions Disp Refills     fenofibrate (TRICOR) 145 MG tablet 90 tablet 1     Sig: Take 1 tablet (145 mg) by mouth daily    Fibrates Passed - 12/10/2018  8:49 PM       Passed - Lipid panel on file in past 12 months    Recent Labs   Lab Test 01/29/18  02/15/17  03/31/15  1327   CHOL 185   < > 142   < > 158   TRIG 290*   < > 309   < > 365*   HDL 39*   < > 31   < > 34*   *  --  49   < > 51   NHDL 146*  --  111   < >  --    VLDL  --   --   --   --  73*   CHOLHDLRATIO  --   --  4.6  --  4.6    < > = values in this interval not displayed.              Passed - No abnormal creatine kinase in past 12 months    Recent Labs   Lab Test 08/01/18  1152                  Passed - Recent (12 mo) or future (30 days) visit within the authorizing provider's specialty    Patient had office visit in the last 12 months or has a visit in the next 30 days with authorizing provider or within the authorizing provider's specialty.  See \"Patient Info\" tab in inbasket, or \"Choose Columns\" in Meds & Orders section of the refill encounter.             Passed - Patient is age 18 or older       Passed - No active pregnancy on record       Passed - No positive pregnancy test in past 12 months        hydrochlorothiazide (HYDRODIURIL) 25 MG tablet 90 tablet 3     Sig: Take 1 tablet (25 mg) by mouth daily    Diuretics (Including Combos) Protocol Failed - 12/10/2018  8:49 PM       Failed - Normal serum creatinine on file in past 12 months    Recent Labs   Lab Test 11/05/18  1413   CR 1.17*             Passed - Blood pressure under 140/90 in past 12 months    BP Readings from Last 3 Encounters:   11/05/18 139/80   11/01/18 155/60   10/23/18 167/65                Passed - Recent (12 mo) or future (30 " "days) visit within the authorizing provider's specialty    Patient had office visit in the last 12 months or has a visit in the next 30 days with authorizing provider or within the authorizing provider's specialty.  See \"Patient Info\" tab in inbasket, or \"Choose Columns\" in Meds & Orders section of the refill encounter.             Passed - Patient is age 18 or older       Passed - No active pregancy on record       Passed - Normal serum potassium on file in past 12 months    Recent Labs   Lab Test 11/05/18  1413   POTASSIUM 4.6                   Passed - Normal serum sodium on file in past 12 months    Recent Labs   Lab Test 11/05/18  1413                Passed - No positive pregnancy test in past 12 months            "

## 2019-01-17 ENCOUNTER — TRANSFERRED RECORDS (OUTPATIENT)
Dept: HEALTH INFORMATION MANAGEMENT | Facility: CLINIC | Age: 74
End: 2019-01-17

## 2019-01-24 ENCOUNTER — TRANSFERRED RECORDS (OUTPATIENT)
Dept: HEALTH INFORMATION MANAGEMENT | Facility: CLINIC | Age: 74
End: 2019-01-24

## 2019-02-04 DIAGNOSIS — A80.9 POLIOMYELITIS: ICD-10-CM

## 2019-02-05 NOTE — TELEPHONE ENCOUNTER
Requested Prescriptions   Pending Prescriptions Disp Refills     gabapentin (NEURONTIN) 100 MG capsule 90 capsule 3     Sig: Take 1 capsule (100 mg) by mouth every evening as needed    There is no refill protocol information for this order        gabapentin (NEURONTIN) 100 MG capsule      Last Written Prescription Date:  1/26/18  Last Fill Quantity: 90 capsule,   # refills: 3  Last Office Visit: 11/5/2018 (Kaycee)  Future Office visit:       Routing refill request to provider for review/approval because:  Drug not on the [facility], [facility] or [facility] refill protocol or controlled substance

## 2019-02-06 RX ORDER — GABAPENTIN 100 MG/1
100 CAPSULE ORAL
Qty: 90 CAPSULE | Refills: 3 | Status: SHIPPED | OUTPATIENT
Start: 2019-02-06 | End: 2020-02-06

## 2019-02-18 DIAGNOSIS — I10 ESSENTIAL HYPERTENSION: ICD-10-CM

## 2019-02-19 NOTE — TELEPHONE ENCOUNTER
"Last Written Prescription Date:  2/06/18  Last Fill Quantity: 180 tablet,  # refills: 3   Last office visit: 11/5/2018 with prescribing provider:  Kaycee   Future Office Visit:      Requested Prescriptions   Pending Prescriptions Disp Refills     atenolol (TENORMIN) 50 MG tablet 180 tablet 3     Sig: Take 1 tablet (50 mg) by mouth 2 times daily    Beta-Blockers Protocol Passed - 2/18/2019  4:16 PM       Passed - Blood pressure under 140/90 in past 12 months    BP Readings from Last 3 Encounters:   11/05/18 139/80   11/01/18 155/60   10/23/18 167/65                Passed - Patient is age 6 or older       Passed - Recent (12 mo) or future (30 days) visit within the authorizing provider's specialty    Patient had office visit in the last 12 months or has a visit in the next 30 days with authorizing provider or within the authorizing provider's specialty.  See \"Patient Info\" tab in inbasket, or \"Choose Columns\" in Meds & Orders section of the refill encounter.             Passed - Medication is active on med list          "

## 2019-02-20 RX ORDER — ATENOLOL 50 MG/1
50 TABLET ORAL 2 TIMES DAILY
Qty: 180 TABLET | Refills: 2 | Status: SHIPPED | OUTPATIENT
Start: 2019-02-20 | End: 2019-11-20

## 2019-03-04 DIAGNOSIS — Z13.6 CARDIOVASCULAR SCREENING; LDL GOAL LESS THAN 130: ICD-10-CM

## 2019-03-05 RX ORDER — FENOFIBRATE 145 MG/1
145 TABLET, COATED ORAL DAILY
Qty: 30 TABLET | Refills: 0 | Status: SHIPPED | OUTPATIENT
Start: 2019-03-05 | End: 2019-04-16

## 2019-03-05 NOTE — TELEPHONE ENCOUNTER
"Last Written Prescription Date:  12/11/18  Last Fill Quantity: 90 tablet,  # refills: 0   Last office visit: 11/5/2018 with prescribing provider:  Kaycee   Future Office Visit:      Requested Prescriptions   Pending Prescriptions Disp Refills     fenofibrate (TRICOR) 145 MG tablet 90 tablet 0     Sig: Take 1 tablet (145 mg) by mouth daily    Fibrates Failed - 3/4/2019  5:24 PM       Failed - Lipid panel on file in past 12 months    Recent Labs   Lab Test 01/29/18  02/15/17  03/31/15  1327   CHOL 185   < > 142   < > 158   TRIG 290*   < > 309   < > 365*   HDL 39*   < > 31   < > 34*   *  --  49   < > 51   NHDL 146*  --  111   < >  --    VLDL  --   --   --   --  73*   CHOLHDLRATIO  --   --  4.6  --  4.6    < > = values in this interval not displayed.              Passed - No abnormal creatine kinase in past 12 months    Recent Labs   Lab Test 08/01/18  1152                  Passed - Recent (12 mo) or future (30 days) visit within the authorizing provider's specialty    Patient had office visit in the last 12 months or has a visit in the next 30 days with authorizing provider or within the authorizing provider's specialty.  See \"Patient Info\" tab in inbasket, or \"Choose Columns\" in Meds & Orders section of the refill encounter.             Passed - Medication is active on med list       Passed - Patient is age 18 or older       Passed - No active pregnancy on record       Passed - No positive pregnancy test in past 12 months          "

## 2019-03-05 NOTE — TELEPHONE ENCOUNTER
Medication is being filled for 1 time refill only due to:  Patient needs to be seen because due for follow up OV and fasting labs.  Floresita ROSE RN

## 2019-03-29 ENCOUNTER — OFFICE VISIT (OUTPATIENT)
Dept: FAMILY MEDICINE | Facility: CLINIC | Age: 74
End: 2019-03-29
Payer: MEDICARE

## 2019-03-29 VITALS
WEIGHT: 212.9 LBS | BODY MASS INDEX: 40.2 KG/M2 | OXYGEN SATURATION: 97 % | TEMPERATURE: 96.7 F | DIASTOLIC BLOOD PRESSURE: 80 MMHG | HEIGHT: 61 IN | HEART RATE: 63 BPM | SYSTOLIC BLOOD PRESSURE: 186 MMHG

## 2019-03-29 DIAGNOSIS — I10 ESSENTIAL HYPERTENSION: ICD-10-CM

## 2019-03-29 DIAGNOSIS — E11.43 DIABETIC GASTROPARESIS (H): ICD-10-CM

## 2019-03-29 DIAGNOSIS — I73.9 INSUFFICIENCY, ARTERIAL, PERIPHERAL (H): ICD-10-CM

## 2019-03-29 DIAGNOSIS — K31.84 DIABETIC GASTROPARESIS (H): ICD-10-CM

## 2019-03-29 DIAGNOSIS — R60.0 LOWER EXTREMITY EDEMA: ICD-10-CM

## 2019-03-29 DIAGNOSIS — I12.9 HYPERTENSIVE KIDNEY DISEASE WITH STAGE 3 CHRONIC KIDNEY DISEASE (H): Primary | ICD-10-CM

## 2019-03-29 DIAGNOSIS — Z93.3 COLOSTOMY IN PLACE (H): ICD-10-CM

## 2019-03-29 DIAGNOSIS — F32.0 MILD MAJOR DEPRESSION (H): ICD-10-CM

## 2019-03-29 DIAGNOSIS — E66.01 MORBID OBESITY (H): ICD-10-CM

## 2019-03-29 DIAGNOSIS — N18.30 HYPERTENSIVE KIDNEY DISEASE WITH STAGE 3 CHRONIC KIDNEY DISEASE (H): Primary | ICD-10-CM

## 2019-03-29 DIAGNOSIS — E78.2 MIXED HYPERLIPIDEMIA: ICD-10-CM

## 2019-03-29 DIAGNOSIS — C73 PAPILLARY CARCINOMA OF THYROID (H): ICD-10-CM

## 2019-03-29 PROBLEM — I12.0 HYPERTENSIVE CHRONIC KIDNEY DISEASE WITH STAGE 5 CHRONIC KIDNEY DISEASE OR END STAGE RENAL DISEASE (H): Status: ACTIVE | Noted: 2019-03-29

## 2019-03-29 LAB — HBA1C MFR BLD: 8.8 % (ref 0–5.6)

## 2019-03-29 PROCEDURE — 80053 COMPREHEN METABOLIC PANEL: CPT | Performed by: INTERNAL MEDICINE

## 2019-03-29 PROCEDURE — 83036 HEMOGLOBIN GLYCOSYLATED A1C: CPT | Performed by: INTERNAL MEDICINE

## 2019-03-29 PROCEDURE — 36415 COLL VENOUS BLD VENIPUNCTURE: CPT | Performed by: INTERNAL MEDICINE

## 2019-03-29 PROCEDURE — 80061 LIPID PANEL: CPT | Performed by: INTERNAL MEDICINE

## 2019-03-29 PROCEDURE — 99214 OFFICE O/P EST MOD 30 MIN: CPT | Performed by: INTERNAL MEDICINE

## 2019-03-29 RX ORDER — LISINOPRIL 40 MG/1
40 TABLET ORAL DAILY
Qty: 90 TABLET | Refills: 3 | Status: SHIPPED | OUTPATIENT
Start: 2019-03-29 | End: 2019-05-17

## 2019-03-29 RX ORDER — FUROSEMIDE 20 MG
20 TABLET ORAL DAILY
Qty: 90 TABLET | Refills: 1 | Status: SHIPPED | OUTPATIENT
Start: 2019-03-29 | End: 2019-04-16

## 2019-03-29 ASSESSMENT — PATIENT HEALTH QUESTIONNAIRE - PHQ9: SUM OF ALL RESPONSES TO PHQ QUESTIONS 1-9: 0

## 2019-03-29 ASSESSMENT — MIFFLIN-ST. JEOR: SCORE: 1413.09

## 2019-03-29 NOTE — PATIENT INSTRUCTIONS
(I12.9,  N18.3) Hypertensive kidney disease with stage 3 chronic kidney disease (H)  (primary encounter diagnosis)  Comment: We will make changes to increase dose of lisinopril and resume lasix  Plan:     (R60.9) Edema  Comment: As above - stat lasix 20 mg dialy and follow up in clinic next week, no more than 2,000 mg in 24 hours  Plan:     (E78.2) Mixed hyperlipidemia  Comment: Check fasting lipid panel and we should consider a statin  Plan: Lipid panel reflex to direct LDL Fasting            (E66.01) Morbid obesity (H)  Comment: No change - consider bariatric clinic follow up  Plan:     (C73) Papillary carcinoma of thyroid (H)  Comment: follow up in endocrinology   Plan:     (E11.43,  K31.84) Diabetic gastroparesis (H)  Comment: follow up in endocrinology   Plan:     (F32.0) Mild major depression (H)  Comment: stable - follow up with counseling.  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

## 2019-03-29 NOTE — PROGRESS NOTES
SUBJECTIVE:   Chasity Hodgson is a 73 year old female who presents to clinic today for the following health issues:      Medication Followup    Taking Medication as prescribed: yes    Side Effects:  Yes, lightheaded     Medication Helping Symptoms:  Yes    2nd /64     [facility]  CLINIC PROGRESS NOTE    Subjective:*  Edema   Chasity Hodgson has had worsening swelling in her feet and hands.  She has retained fluids since stopping lasix and taking hydrochlorothiazide only.  Her blood pressure is quite elevated today.    Mixed hyperlipidemia   She is due for a cholesterol check today and is not taking a statin due to intolerance.   Morbid obesity (H)   She has had no change in weight since November and wishes to continue self- directed dieting.  Papillary carcinoma of thyroid (H)   She does follow up in endocrinology.  No acute concerns  Diabetic gastroparesis (H)   Continues to have normal stool output.  No recent abdominal pain.  Mild major depression (H)   Mood is pretty good. She intends to follow up in support group for spouses of patient's with Parkinson's.    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)   Colostomy has been functioning and no issues other than a slight redness aound ostomy site.  Hypertensive kidney disease with stage 3 chronic kidney disease (H)   Blood pressure is quite elevated today.  She has a home wrist cuff that measures at 160 systolic.  At last office visit her blood pressure was 139 systolic.      Past medical history, medications, allergies, social history, family history reviewed and updated in [facility] as of 3/29/2019 .    ROS  CONSTITUTIONAL: no fatigue, no unexpected change in weight  SKIN: no worrisome rashes, no worrisome moles, no worrisome lesions  EYES: no acute vision problems or changes  ENT: no ear problems, no mouth problems, no throat problems  RESP: no significant cough, no shortness of breath  CV: no chest pain, no palpitations, no new or worsening peripheral edema  GI: colostomy  "functioning fine, occasional issues with rectum obstruction  : no frequency, no dysuria, no hematuria  MS: no claudication, no myalgias, no joint aches  PSYCHIATRIC: stress of caring for her       Objective:  Vitals  /80 (BP Location: Right arm, Patient Position: Sitting, Cuff Size: Adult Regular)   Pulse 63   Temp 96.7  F (35.9  C) (Oral)   Ht 1.549 m (5' 1\")   Wt 96.6 kg (212 lb 14.4 oz)   SpO2 97%   BMI 40.23 kg/m    GEN: Alert Oriented x3 NAD  HEENT: Atraumatic, normocephalic, neck supple, no thyromegaly, negative cervical adenopathy  CV: RRR no murmurs or rubs  PULM: CTA no wheezes or crackles  ABD: obese, no hepatosplenomegally  SKIN: No visible skin lesion or ulcerations  EXT: 2+ edema bilateral lower extremities  NEURO: Gait and station normal, No focal neurologic deficits  PSYCH: Mood good, affect mood congruent    No images are attached to the encounter.    Results for orders placed or performed in visit on 03/29/19 (from the past 24 hour(s))   Hemoglobin A1c   Result Value Ref Range    Hemoglobin A1C 8.8 (H) 0 - 5.6 %       Assessment/Plan:  Patient Instructions   (I12.9,  N18.3) Hypertensive kidney disease with stage 3 chronic kidney disease (H)  (primary encounter diagnosis)  Comment: We will make changes to increase dose of lisinopril and resume lasix  Plan:     (R60.9) Edema  Comment: As above - stat lasix 20 mg dialy and follow up in clinic next week, no more than 2,000 mg in 24 hours  Plan:     (E78.2) Mixed hyperlipidemia  Comment: Check fasting lipid panel and we should consider a statin  Plan: Lipid panel reflex to direct LDL Fasting            (E66.01) Morbid obesity (H)  Comment: No change - consider bariatric clinic follow up  Plan:     (C73) Papillary carcinoma of thyroid (H)  Comment: follow up in endocrinology   Plan:     (E11.43,  K31.84) Diabetic gastroparesis (H)  Comment: follow up in endocrinology   Plan:     (F32.0) Mild major depression (H)  Comment: stable - " follow up with counseling.  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 As a result, there may be errors in the script that have gone undetected. Please consider this when interpreting information found in this chart.    Shola Benoit MD  [phone]

## 2019-03-30 LAB
ALBUMIN SERPL-MCNC: 3.4 G/DL (ref 3.4–5)
ALP SERPL-CCNC: 46 U/L (ref 40–150)
ALT SERPL W P-5'-P-CCNC: 25 U/L (ref 0–50)
ANION GAP SERPL CALCULATED.3IONS-SCNC: 5 MMOL/L (ref 3–14)
AST SERPL W P-5'-P-CCNC: 28 U/L (ref 0–45)
BILIRUB SERPL-MCNC: 0.5 MG/DL (ref 0.2–1.3)
BUN SERPL-MCNC: 27 MG/DL (ref 7–30)
CALCIUM SERPL-MCNC: 9 MG/DL (ref 8.5–10.1)
CHLORIDE SERPL-SCNC: 106 MMOL/L (ref 94–109)
CHOLEST SERPL-MCNC: 196 MG/DL
CO2 SERPL-SCNC: 28 MMOL/L (ref 20–32)
CREAT SERPL-MCNC: 1.2 MG/DL (ref 0.52–1.04)
GFR SERPL CREATININE-BSD FRML MDRD: 45 ML/MIN/{1.73_M2}
GLUCOSE SERPL-MCNC: 175 MG/DL (ref 70–99)
HDLC SERPL-MCNC: 27 MG/DL
LDLC SERPL CALC-MCNC: 99 MG/DL
NONHDLC SERPL-MCNC: 169 MG/DL
POTASSIUM SERPL-SCNC: 4.7 MMOL/L (ref 3.4–5.3)
PROT SERPL-MCNC: 6.8 G/DL (ref 6.8–8.8)
SODIUM SERPL-SCNC: 139 MMOL/L (ref 133–144)
TRIGL SERPL-MCNC: 349 MG/DL

## 2019-04-01 NOTE — RESULT ENCOUNTER NOTE
Gerard Gaspar,    I had the opportunity to review your recent labs and a summary of your labs reads as follows:    Your comprehensive metabolic panel showed stable impaired renal function, normal liver function,  Your hemoglobin Hemoglobin A1c is much higher and I would recommend we follow up to review blood glucose management strategies  Your fasting lipid panel show  - low HDL (good) cholesterol -as your goal is greater than 40  - low LDL (bad) cholesterol as your goal is less than 100  - stable triglyceride levels    Congratulaions on your excellent results      Sincerely,  Shola Benoit MD

## 2019-04-04 ENCOUNTER — TRANSFERRED RECORDS (OUTPATIENT)
Dept: HEALTH INFORMATION MANAGEMENT | Facility: CLINIC | Age: 74
End: 2019-04-04

## 2019-04-16 ENCOUNTER — OFFICE VISIT (OUTPATIENT)
Dept: FAMILY MEDICINE | Facility: CLINIC | Age: 74
End: 2019-04-16
Payer: MEDICARE

## 2019-04-16 ENCOUNTER — TELEPHONE (OUTPATIENT)
Dept: FAMILY MEDICINE | Facility: CLINIC | Age: 74
End: 2019-04-16

## 2019-04-16 ENCOUNTER — HOSPITAL ENCOUNTER (OUTPATIENT)
Dept: MAMMOGRAPHY | Facility: CLINIC | Age: 74
Discharge: HOME | End: 2019-04-16
Attending: OBSTETRICS & GYNECOLOGY | Admitting: OBSTETRICS & GYNECOLOGY
Payer: MEDICARE

## 2019-04-16 VITALS
WEIGHT: 209 LBS | HEIGHT: 61 IN | OXYGEN SATURATION: 96 % | SYSTOLIC BLOOD PRESSURE: 148 MMHG | BODY MASS INDEX: 39.46 KG/M2 | HEART RATE: 63 BPM | TEMPERATURE: 97.9 F | DIASTOLIC BLOOD PRESSURE: 73 MMHG

## 2019-04-16 DIAGNOSIS — Z13.6 CARDIOVASCULAR SCREENING; LDL GOAL LESS THAN 130: ICD-10-CM

## 2019-04-16 DIAGNOSIS — I73.9 INSUFFICIENCY, ARTERIAL, PERIPHERAL (H): ICD-10-CM

## 2019-04-16 DIAGNOSIS — E11.69 TYPE 2 DIABETES MELLITUS WITH OTHER SPECIFIED COMPLICATION, WITH LONG-TERM CURRENT USE OF INSULIN (H): Primary | ICD-10-CM

## 2019-04-16 DIAGNOSIS — N18.30 CKD (CHRONIC KIDNEY DISEASE) STAGE 3, GFR 30-59 ML/MIN (H): ICD-10-CM

## 2019-04-16 DIAGNOSIS — Z79.4 TYPE 2 DIABETES MELLITUS WITH OTHER SPECIFIED COMPLICATION, WITH LONG-TERM CURRENT USE OF INSULIN (H): Primary | ICD-10-CM

## 2019-04-16 DIAGNOSIS — Z12.39 BREAST CANCER SCREENING: ICD-10-CM

## 2019-04-16 DIAGNOSIS — R60.0 LOWER EXTREMITY EDEMA: ICD-10-CM

## 2019-04-16 PROCEDURE — 77063 BREAST TOMOSYNTHESIS BI: CPT

## 2019-04-16 PROCEDURE — 99214 OFFICE O/P EST MOD 30 MIN: CPT | Performed by: INTERNAL MEDICINE

## 2019-04-16 RX ORDER — FENOFIBRATE 145 MG/1
145 TABLET, COATED ORAL DAILY
Qty: 90 TABLET | Refills: 3 | Status: SHIPPED | OUTPATIENT
Start: 2019-04-16 | End: 2020-05-20

## 2019-04-16 RX ORDER — FUROSEMIDE 20 MG
20 TABLET ORAL DAILY
Qty: 135 TABLET | Refills: 3 | Status: SHIPPED | OUTPATIENT
Start: 2019-04-16 | End: 2019-04-17

## 2019-04-16 ASSESSMENT — MIFFLIN-ST. JEOR: SCORE: 1395.4

## 2019-04-16 NOTE — TELEPHONE ENCOUNTER
Reason for Call:  Medication:    Do you use a [facility]?  Name of the pharmacy and phone number for the current request:       [facility], [place] - [address]    Name of the medication requested: furosemide (LASIX) 20 MG tablet    Other request: Pharmacist needs clarification on directions. Confirmed already directions for take 20 mg twice per day on M,W,F.    However questioning Tues, Thurs, Sat and Sun directions.     Please call her @[phone]    Can we leave a detailed message on this number? yes    Phone number patient can be reached at: [phone]    Best Time: any    Call taken on 4/16/2019 at 2:35 PM by Soto Lafleur

## 2019-04-16 NOTE — PROGRESS NOTES
"  SUBJECTIVE:   Chasity Hodgson is a 73 year old female who presents to clinic today for the following   health issues:    Follow up    [facility]  CLINIC PROGRESS NOTE    Subjective:  Hypertension   Chasity Hodgson has been taking lasix 20 mg daily as well as lisinopril.  She is also taking atenolol 50 mg twice per day.  She has been getting occasional dizzy spells and notes that they last for a couple of hours after she takes her AM insulin.  Her blood pressure has remained stable at home.    Diabetes mellitus   In addition to he current insulin prescription she has also taken Januvia.  She has been following with endocrinology.        Past medical history, medications, allergies, social history, family history reviewed and updated in [facility] as of 4/16/2019 .    ROS  CONSTITUTIONAL: no fatigue, no unexpected change in weight  SKIN: no worrisome rashes, no worrisome moles, no worrisome lesions  EYES: no acute vision problems or changes  ENT: no ear problems, no mouth problems   RESP: no significant cough, no shortness of breath  CV: no chest pain, no palpitations - occasional light-headednesss  GI: no nausea, no vomiting, no constipation, no diarrhea  : no frequency, no dysuria, no hematuria  MS: no claudication, no myalgias, no joint aches  PSYCHIATRIC: no changes in mood or affect      Objective:  Vitals  /73 (BP Location: Right arm, Cuff Size: Adult Large)   Pulse 63   Temp 97.9  F (36.6  C) (Oral)   Ht 1.549 m (5' 1\")   Wt 94.8 kg (209 lb)   SpO2 96%   BMI 39.49 kg/m    GEN: Alert Oriented x3 NAD  HEENT: Atraumatic, normocephalic, neck supple   CV: RRR no murmurs or rubs  PULM: CTA no wheezes or crackles  ABD: Soft, nontender nondistended, no hepatosplenomegally  SKIN: No visible skin lesion or ulcerations  EXT: 1+ edema bilateral lower extremities  NEURO: Gait and station deferred, No focal neurologic deficits  PSYCH: Mood good, affect mood congruent    No images are attached to the " encounter.    No results found for this or any previous visit (from the past 24 hour(s)).    Assessment/Plan:  Patient Instructions   (E11.69,  Z79.4) Type 2 diabetes mellitus with other specified complication, with long-term current use of insulin (H)  (primary encounter diagnosis)  Comment: We will plan to continue follow up in endocrinology   Plan:     (Z13.6) CARDIOVASCULAR SCREENING; LDL GOAL LESS THAN 130  Comment: Plan to start cholesterol medication as prescribed by endocrinology.  Refill fenofibrate  Plan: fenofibrate (TRICOR) 145 MG tablet            (I73.9) Insufficiency, arterial, peripheral (H)  Comment:   Plan: order for DME            (I12.0) Hypertensive chronic kidney disease with stage 5 chronic kidney disease or end stage renal disease (H)  Comment: Blood pressure is much improved but not to goal.  I would recommend increasing dose of lasix to twice per day M,W,F and recheck basic metabolic panel in 1 month  Plan:        Follow up in 1 month     Disclaimer: This note consists of symbols derived from keyboarding, dictation and/or voice recognition software. As a result, there may be errors in the script that have gone undetected. Please consider this when interpreting information found in this chart.    Shola Benoit MD  [phone]

## 2019-04-16 NOTE — PATIENT INSTRUCTIONS
(E11.69,  Z79.4) Type 2 diabetes mellitus with other specified complication, with long-term current use of insulin (H)  (primary encounter diagnosis)  Comment: We will plan to continue follow up in endocrinology   Plan:     (Z13.6) CARDIOVASCULAR SCREENING; LDL GOAL LESS THAN 130  Comment: Plan to start cholesterol medication as prescribed by endocrinology.  Refill fenofibrate  Plan: fenofibrate (TRICOR) 145 MG tablet            (I73.9) Insufficiency, arterial, peripheral (H)  Comment:   Plan: order for DME            (I12.0) Hypertensive chronic kidney disease with stage 5 chronic kidney disease or end stage renal disease (H)  Comment: Blood pressure is much improved but not to goal.  I would recommend increasing dose of lasix to twice per day M,W,F and recheck basic metabolic panel in 1 month  Plan:

## 2019-04-17 PROBLEM — I12.0 HYPERTENSIVE CHRONIC KIDNEY DISEASE WITH STAGE 5 CHRONIC KIDNEY DISEASE OR END STAGE RENAL DISEASE (H): Status: RESOLVED | Noted: 2019-03-29 | Resolved: 2019-04-17

## 2019-04-17 PROBLEM — N18.30 CKD (CHRONIC KIDNEY DISEASE) STAGE 3, GFR 30-59 ML/MIN (H): Status: ACTIVE | Noted: 2019-04-17

## 2019-04-17 RX ORDER — FUROSEMIDE 20 MG
20 TABLET ORAL DAILY
Qty: 135 TABLET | Refills: 3 | Status: SHIPPED | OUTPATIENT
Start: 2019-04-17 | End: 2020-10-01

## 2019-05-06 ENCOUNTER — ALLIED HEALTH/NURSE VISIT (OUTPATIENT)
Dept: FAMILY MEDICINE | Facility: CLINIC | Age: 74
End: 2019-05-06
Payer: MEDICARE

## 2019-05-06 VITALS — SYSTOLIC BLOOD PRESSURE: 138 MMHG | DIASTOLIC BLOOD PRESSURE: 50 MMHG

## 2019-05-06 DIAGNOSIS — Z01.30 BP CHECK: Primary | ICD-10-CM

## 2019-05-06 PROCEDURE — 99207 ZZC NO CHARGE NURSE ONLY: CPT | Performed by: INTERNAL MEDICINE

## 2019-05-06 NOTE — PROGRESS NOTES
Chasity Hodgson was evaluated at [facility] on May 6, 2019 at which time her blood pressure was:    BP Readings from Last 3 Encounters:   05/06/19 138/50   04/16/19 148/73   03/29/19 186/80     Pulse Readings from Last 3 Encounters:   04/16/19 63   03/29/19 63   11/05/18 92       Reviewed lifestyle modifications for blood pressure control and reduction: including making healthy food choices, managing weight, getting regular exercise, smoking cessation, reducing alcohol consumption, monitoring blood pressure regularly.     Symptoms: None    BP Goal:< 140/90 mmHg    BP Assessment:  BP at goal    Potential Reasons for BP too high: NA - Not applicable    BP Follow-Up Plan: Recheck BP in 6 months at pharmacy    Recommendation to Provider: none     Note completed by: Susi Harvey [facility]   [phone]

## 2019-05-16 DIAGNOSIS — N18.30 CKD (CHRONIC KIDNEY DISEASE) STAGE 3, GFR 30-59 ML/MIN (H): ICD-10-CM

## 2019-05-16 DIAGNOSIS — Z79.4 TYPE 2 DIABETES MELLITUS WITH OTHER SPECIFIED COMPLICATION, WITH LONG-TERM CURRENT USE OF INSULIN (H): ICD-10-CM

## 2019-05-16 DIAGNOSIS — E11.69 TYPE 2 DIABETES MELLITUS WITH OTHER SPECIFIED COMPLICATION, WITH LONG-TERM CURRENT USE OF INSULIN (H): ICD-10-CM

## 2019-05-16 DIAGNOSIS — R60.0 LOWER EXTREMITY EDEMA: ICD-10-CM

## 2019-05-16 LAB
ERYTHROCYTE [DISTWIDTH] IN BLOOD BY AUTOMATED COUNT: 12.4 % (ref 10–15)
HCT VFR BLD AUTO: 35.5 % (ref 35–47)
HGB BLD-MCNC: 12.1 G/DL (ref 11.7–15.7)
MCH RBC QN AUTO: 31.2 PG (ref 26.5–33)
MCHC RBC AUTO-ENTMCNC: 34.1 G/DL (ref 31.5–36.5)
MCV RBC AUTO: 92 FL (ref 78–100)
PLATELET # BLD AUTO: 190 10E9/L (ref 150–450)
RBC # BLD AUTO: 3.88 10E12/L (ref 3.8–5.2)
WBC # BLD AUTO: 7.9 10E9/L (ref 4–11)

## 2019-05-16 PROCEDURE — 80048 BASIC METABOLIC PNL TOTAL CA: CPT | Performed by: INTERNAL MEDICINE

## 2019-05-16 PROCEDURE — 85027 COMPLETE CBC AUTOMATED: CPT | Performed by: INTERNAL MEDICINE

## 2019-05-16 PROCEDURE — 36415 COLL VENOUS BLD VENIPUNCTURE: CPT | Performed by: INTERNAL MEDICINE

## 2019-05-16 PROCEDURE — 82043 UR ALBUMIN QUANTITATIVE: CPT | Performed by: INTERNAL MEDICINE

## 2019-05-17 ENCOUNTER — OFFICE VISIT (OUTPATIENT)
Dept: FAMILY MEDICINE | Facility: CLINIC | Age: 74
End: 2019-05-17
Payer: MEDICARE

## 2019-05-17 VITALS
TEMPERATURE: 97.5 F | BODY MASS INDEX: 39.65 KG/M2 | WEIGHT: 210 LBS | DIASTOLIC BLOOD PRESSURE: 62 MMHG | SYSTOLIC BLOOD PRESSURE: 124 MMHG | HEART RATE: 61 BPM | HEIGHT: 61 IN | OXYGEN SATURATION: 99 %

## 2019-05-17 DIAGNOSIS — I10 BENIGN ESSENTIAL HYPERTENSION: ICD-10-CM

## 2019-05-17 DIAGNOSIS — I10 ESSENTIAL HYPERTENSION: ICD-10-CM

## 2019-05-17 DIAGNOSIS — E11.69 TYPE 2 DIABETES MELLITUS WITH OTHER SPECIFIED COMPLICATION, WITHOUT LONG-TERM CURRENT USE OF INSULIN (H): Primary | ICD-10-CM

## 2019-05-17 LAB
ANION GAP SERPL CALCULATED.3IONS-SCNC: 6 MMOL/L (ref 3–14)
BUN SERPL-MCNC: 40 MG/DL (ref 7–30)
CALCIUM SERPL-MCNC: 9.1 MG/DL (ref 8.5–10.1)
CHLORIDE SERPL-SCNC: 103 MMOL/L (ref 94–109)
CO2 SERPL-SCNC: 30 MMOL/L (ref 20–32)
CREAT SERPL-MCNC: 1.52 MG/DL (ref 0.52–1.04)
CREAT UR-MCNC: 31 MG/DL
GFR SERPL CREATININE-BSD FRML MDRD: 34 ML/MIN/{1.73_M2}
GLUCOSE SERPL-MCNC: 196 MG/DL (ref 70–99)
MICROALBUMIN UR-MCNC: <5 MG/L
MICROALBUMIN/CREAT UR: NORMAL MG/G CR (ref 0–25)
POTASSIUM SERPL-SCNC: 5 MMOL/L (ref 3.4–5.3)
SODIUM SERPL-SCNC: 139 MMOL/L (ref 133–144)

## 2019-05-17 PROCEDURE — 93000 ELECTROCARDIOGRAM COMPLETE: CPT | Performed by: INTERNAL MEDICINE

## 2019-05-17 PROCEDURE — 99214 OFFICE O/P EST MOD 30 MIN: CPT | Performed by: INTERNAL MEDICINE

## 2019-05-17 RX ORDER — LISINOPRIL 20 MG/1
20 TABLET ORAL DAILY
Qty: 90 TABLET | Refills: 3 | Status: SHIPPED | OUTPATIENT
Start: 2019-05-17 | End: 2019-06-01

## 2019-05-17 ASSESSMENT — MIFFLIN-ST. JEOR: SCORE: 1399.93

## 2019-05-31 ENCOUNTER — OFFICE VISIT (OUTPATIENT)
Dept: FAMILY MEDICINE | Facility: CLINIC | Age: 74
End: 2019-05-31
Payer: MEDICARE

## 2019-05-31 VITALS
BODY MASS INDEX: 40.02 KG/M2 | HEIGHT: 61 IN | SYSTOLIC BLOOD PRESSURE: 161 MMHG | DIASTOLIC BLOOD PRESSURE: 65 MMHG | TEMPERATURE: 97.4 F | WEIGHT: 212 LBS | HEART RATE: 55 BPM | OXYGEN SATURATION: 98 %

## 2019-05-31 DIAGNOSIS — Z79.4 TYPE 2 DIABETES MELLITUS WITH OTHER SPECIFIED COMPLICATION, WITH LONG-TERM CURRENT USE OF INSULIN (H): ICD-10-CM

## 2019-05-31 DIAGNOSIS — E11.69 TYPE 2 DIABETES MELLITUS WITH OTHER SPECIFIED COMPLICATION, WITH LONG-TERM CURRENT USE OF INSULIN (H): ICD-10-CM

## 2019-05-31 DIAGNOSIS — Z01.818 PREOP GENERAL PHYSICAL EXAM: ICD-10-CM

## 2019-05-31 DIAGNOSIS — K62.4 RECTAL STRICTURE: ICD-10-CM

## 2019-05-31 DIAGNOSIS — N18.30 CKD (CHRONIC KIDNEY DISEASE) STAGE 3, GFR 30-59 ML/MIN (H): ICD-10-CM

## 2019-05-31 DIAGNOSIS — I10 ESSENTIAL HYPERTENSION: ICD-10-CM

## 2019-05-31 DIAGNOSIS — Z01.818 PRE-OP EXAM: Primary | ICD-10-CM

## 2019-05-31 DIAGNOSIS — I10 BENIGN ESSENTIAL HYPERTENSION: ICD-10-CM

## 2019-05-31 LAB
ANION GAP SERPL CALCULATED.3IONS-SCNC: 7 MMOL/L (ref 3–14)
BUN SERPL-MCNC: 29 MG/DL (ref 7–30)
CALCIUM SERPL-MCNC: 8.7 MG/DL (ref 8.5–10.1)
CHLORIDE SERPL-SCNC: 103 MMOL/L (ref 94–109)
CO2 SERPL-SCNC: 27 MMOL/L (ref 20–32)
CREAT SERPL-MCNC: 1.47 MG/DL (ref 0.52–1.04)
GFR SERPL CREATININE-BSD FRML MDRD: 35 ML/MIN/{1.73_M2}
GLUCOSE SERPL-MCNC: 212 MG/DL (ref 70–99)
POTASSIUM SERPL-SCNC: 4.5 MMOL/L (ref 3.4–5.3)
SODIUM SERPL-SCNC: 137 MMOL/L (ref 133–144)

## 2019-05-31 PROCEDURE — 36415 COLL VENOUS BLD VENIPUNCTURE: CPT | Performed by: INTERNAL MEDICINE

## 2019-05-31 PROCEDURE — 80048 BASIC METABOLIC PNL TOTAL CA: CPT | Performed by: INTERNAL MEDICINE

## 2019-05-31 PROCEDURE — 99215 OFFICE O/P EST HI 40 MIN: CPT | Performed by: INTERNAL MEDICINE

## 2019-05-31 ASSESSMENT — MIFFLIN-ST. JEOR: SCORE: 1409.01

## 2019-05-31 NOTE — PATIENT INSTRUCTIONS
Before Your Surgery      Call your surgeon if there is any change in your health. This includes signs of a cold or flu (such as a sore throat, runny nose, cough, rash or fever).    Do not smoke, drink alcohol or take over the counter medicine (unless your surgeon or primary care doctor tells you to) for the 24 hours before and after surgery.    If you take prescribed drugs: Follow your doctor s orders about which medicines to take and which to stop until after surgery.    Eating and drinking prior to surgery: follow the instructions from your surgeon    Take a shower or bath the night before surgery. Use the soap your surgeon gave you to gently clean your skin. If you do not have soap from your surgeon, use your regular soap. Do not shave or scrub the surgery site.  Wear clean pajamas and have clean sheets on your bed.          (Z01.818) Pre-op exam  (primary encounter diagnosis)  Comment: you are medically optimized for your upcoming surgery pending labs.  Hold lisinopril on the day procedure.  Hold aspirin and all NSAIDs for 1 week leading up to surgery.  Take Trujeo at half normal dose on the day of procdure and hold Januvia the night prior.    Plan:   **    (E11.69,  Z79.4) Type 2 diabetes mellitus with other specified complication, with long-term current use of insulin (H)  Comment: As above - hold Januvia on day of procedure   Plan:     (N18.3) CKD (chronic kidney disease) stage 3, GFR 30-59 ml/min (H)  Comment: check basic metabolic panel today   Plan: Basic metabolic panel            (I10) Benign essential hypertension  Comment: blood pressure is a bit elevated, but may be too high - we will plan to increase lisinopril back to 40 mg and follow up in 1 week for blood pressure recheck  Plan: Basic metabolic panel

## 2019-05-31 NOTE — PROGRESS NOTES
[address] [address]  [phone]  Dept: [phone]    PRE-OP EVALUATION:  Today's date: 2019    Chasity Hodgson (: 1945) presents for pre-operative evaluation assessment as requested by Dr. Lawler .  She requires evaluation and anesthesia risk assessment prior to undergoing surgery/procedure for treatment of Ano-rectal Surgery .    Proposed Surgery/ Procedure: Ano-rectal surgery  Date of Surgery/ Procedure:   Time of Surgery/ Procedure:   Hospital/Surgical Facility: [facility]   Fax number for surgical facility: [phone]  Primary Physician: Shola Benoit  Type of Anesthesia Anticipated: General    Patient has a Health Care Directive or Living Will:  YES     1. NO - Do you have a history of heart attack, stroke, stent, bypass or surgery on an artery in the head, neck, heart or legs?  2. NO - Do you ever have any pain or discomfort in your chest?  3. NO - Do you have a history of  Heart Failure?  4. NO - Are you troubled by shortness of breath when: walking on the level, up a slight hill or at night?  5. NO - Do you currently have a cold, bronchitis or other respiratory infection?  6. NO - Do you have a cough, shortness of breath or wheezing?  7. NO - Do you sometimes get pains in the calves of your legs when you walk?  8. YES - Do you or anyone in your family have previous history of blood clots? Upper extremity deep vein thrombosis in setting of PICC  9. NO - Do you or does anyone in your family have a serious bleeding problem such as prolonged bleeding following surgeries or cuts?  10. YES- Have you ever had problems with anemia or been told to take iron pills?  11. NO - Have you had any abnormal blood loss such as black, tarry or bloody stools, or abnormal vaginal bleeding?  12. YES - Have you ever had a blood transfusion?  13. NO - Have you or any of your relatives ever had problems with anesthesia?  14.  YES - Do you have sleep apnea, excessive snoring or daytime drowsiness? - has obstructive sleep apnea, not using CPAP  15. NO - Do you have any prosthetic heart valves?  16. YES - Do you have prosthetic joints? Right shoulder replacement  17. NO - Is there any chance that you may be pregnant?      HPI:     HPI related to upcoming procedure: Ano-rectal dilation      See problem list for active medical problems.  Problems all longstanding and stable, except as noted/documented.  See ROS for pertinent symptoms related to these conditions.                                                                                                                                                          .    MEDICAL HISTORY:     Patient Active Problem List    Diagnosis Date Noted     CKD (chronic kidney disease) stage 3, GFR 30-59 ml/min (H) 04/17/2019     Priority: Medium     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     Priority: Medium     Morbid obesity (H) 08/01/2018     Priority: Medium     Normocytic anemia 01/16/2017     Priority: Medium     Allergic dermatitis due to other chemical product 10/02/2016     Priority: Medium     Insufficiency, arterial, peripheral (H) 11/08/2015     Priority: Medium     Mild seen on CARMITA 11/2015 - right sided       Type 2 diabetes mellitus with other specified complication (H) 10/18/2015     Priority: Medium     Carotid stenosis 12/09/2014     Priority: Medium     Mild increased stenosis 50-69% left ICA       Right shoulder pain 12/09/2014     Priority: Medium     Left knee pain 11/03/2014     Priority: Medium     Poliomyelitis      Priority: Medium     poor circulation right leg       Diabetic gastroparesis (H)      Priority: Medium     Dermatitis 02/15/2014     Priority: Medium     Acne rosacea 02/15/2014     Priority: Medium     Esophageal reflux 01/23/2014     Priority: Medium     Had upper endoscopy - Dr. Pantoja 2013       Pulmonary nodule 09/06/2013     Priority: Medium     Alopecia 02/09/2013      Priority: Medium     Problem list name updated by automated process. Provider to review       Papillary carcinoma of thyroid (H)      Priority: Medium     s/p thyroidectomy - Ruegemer       Gastroparesis 11/12/2012     Priority: Medium     Postsurgical hypothyroidism      Priority: Medium     s/p papillary thryoid cancer - Marj  Concern for possible recurrence 03/2014       Type 2 diabetes, HbA1c goal < 7% (H)      Priority: Medium     ACP (advance care planning) 09/21/2012     Priority: Medium     Discussed advance care planning with patient; information given to patient to review. 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 Provider to review       ANNETTE (obstructive sleep apnea)      Priority: Medium     Mixed hyperlipidemia 10/31/2010     Priority: Medium     Low back pain 07/15/2009     Priority: Medium      Past Medical History:   Diagnosis Date     Abdominal adhesions 1984, 96,99    s/p lysis     Allergic rhinitis, cause unspecified      Carotid stenosis      CPAP (continuous positive airway pressure) dependence      Diabetic gastroparesis (H)      Diet-controlled type 2 diabetes mellitus (H)      DVT of axillary vein, acute right (H)      Fibromyalgia      Gastro-oesophageal reflux disease      Hernia of unspecified site of abdominal cavity without mention of obstruction or gangrene     bilateral     HTN (hypertension)      Hypertriglyceridemia      Obstructive sleep apnea      ANNETTE (obstructive sleep apnea)      Papillary  carcinoma of thyroid (H)     s/p thyroidectomy - Ruegemer     PE (pulmonary embolism)      Poliomyelitis     poor circulation right leg     Postsurgical hypothyroidism     s/p papillary thryoid cancer - Ruegemer     Pulmonary embolism (H)      Rosacea      S/P carpal tunnel release     bilateral     S/P hardware removal 01/2014    still with lingering foot pain     S/P shoulder surgery     bilateral     Septic joint (H)     right knee     Venous insufficiency      Venous thrombosis 1999    right axillary vein     Past Surgical History:   Procedure Laterality Date     AMPUTATE TOE(S)  3/15/2012    Procedure:AMPUTATE TOE(S); Surgeon:SUKHDEEP METZ; Location: OR     APPENDECTOMY  1972     ARTHRODESIS FOOT  3/15/2012    Procedure:ARTHRODESIS FOOT; RIGHT TRIPLE ARTHRODESIS, FIFTH TOE AMPUTATION, LATERAL LIGAMENT RECONSTRUCTION, TENDON TRANSFER AND RELEASE [MINI C-ARM, ARTHREX 4.5 AND 6.7 STAPLES, BIOCOMPOSITE TENODESIS SCREWS]; Surgeon:SUKHDEEP METZ; Location: OR      FREEING BOWEL ADHESION,ENTEROLYSIS      1986, 1996, 1999     C NONSPECIFIC PROCEDURE      throidectomy     C TOTAL ABDOM HYSTERECTOMY  1980    + BSO     CHOLECYSTECTOMY       COLOSTOMY  2/7/2012    Procedure:COLOSTOMY; CREATION OF SIGMOID COLOSTOMY AND EXTENSIVE  LYSIS OF ADHESIONS; Surgeon:MONTSERRAT BENDER P; Location: OR     GI SURGERY      weakened rectal sphincter with artificial stimulator     LAPAROTOMY, LYSIS ADHESIONS, COMBINED  2/7/2012    Procedure:COMBINED LAPAROTOMY, LYSIS ADHESIONS; Surgeon:MONTSERRAT BENDER; Location: OR     RELEASE TENDON FOOT  3/15/2012    Procedure:RELEASE TENDON FOOT; Surgeon:SUKHDEEP METZ; Location: OR     REMOVE HARDWARE FOOT  12/13/2012    Procedure: REMOVE HARDWARE FOOT;  RIGHT FOOT REMOVAL OF HARDWARE;  Surgeon: Sukhdeep Metz MD;  Location:  OR     Current Outpatient Medications   Medication Sig Dispense Refill     ascorbic acid (VITAMIN C) 1000 MG TABS Take  1,000 mg by mouth daily       aspirin (SB LOW DOSE ASA EC) 81 MG EC tablet Take 81 mg by mouth daily       atenolol (TENORMIN) 50 MG tablet Take 1 tablet (50 mg) by mouth 2 times daily 180 tablet 2     Azelastine HCl (ASTEPRO NA)        BIOTIN PO Take 1,000 mcg by mouth       blood glucose monitoring (NO BRAND SPECIFIED) test strip Use to test blood sugar 1 times daily or as directed. 1 Box 6     Cholecalciferol (VITAMIN D-3 PO) Take 2 tablets by mouth       clotrimazole (LOTRIMIN) 1 % cream Apply topically daily       cycloSPORINE (RESTASIS) 0.05 % ophthalmic emulsion 1 drop 2 times daily       diphenhydrAMINE-acetaminophen (TYLENOL PM)  MG tablet Take 1 tablet by mouth At Bedtime Reported on 3/20/2017       famotidine (PEPCID) 40 MG tablet Take 40 mg by mouth At Bedtime       fenofibrate (TRICOR) 145 MG tablet Take 1 tablet (145 mg) by mouth daily 90 tablet 3     ferrous sulfate (IRON) 325 (65 FE) MG tablet Take 325 mg by mouth daily (with breakfast)       fexofenadine (ALLEGRA) 180 MG tablet Take 180 mg by mouth daily. 120 0     fluticasone (FLONASE) 50 MCG/ACT spray Spray 2 sprays in nostril daily 2 sprays in each nostril qd 1 Bottle 0     furosemide (LASIX) 20 MG tablet Take 1 tablet (20 mg) by mouth daily And Take additional 20 mg in the afternoon M,W,F 135 tablet 3     gabapentin (NEURONTIN) 100 MG capsule Take 1 capsule (100 mg) by mouth every evening as needed 90 capsule 3     insulin glargine U-300 (TOUJEO SOLOSTAR) 300 UNIT/ML injection Inject 34 Units Subcutaneous every morning        levothyroxine (SYNTHROID) 112 MCG tablet Take 112 mcg by mouth daily Take 112 mcg daily except for Friday takes an additional 56 mcg.  Brand name Synthroid       lisinopril (PRINIVIL/ZESTRIL) 20 MG tablet Take 1 tablet (20 mg) by mouth daily 90 tablet 3     metoclopramide (REGLAN) 5 MG tablet Take 1-2 tablets (5-10 mg) by mouth 2 times daily as needed a 120 tablet 0     MULTIVITAMINS OR TABS ONE DAILY 100 3      "nitroGLYcerin (NITROSTAT) 0.4 MG sublingual tablet Place 1 tablet (0.4 mg) under the tongue every 5 minutes as needed for chest pain (no more than 3 in one hour; after 3rd, call 911.) 25 tablet 3     nystatin (MYCOSTATIN) cream Apply topically daily as needed        nystatin-triamcinolone (MYCOLOG II) cream Apply topically daily as needed       ondansetron (ZOFRAN) 4 MG tablet Take 1 tablet by mouth every 6 hours as needed Reported on 3/20/2017       order for DME Equipment being ordered: Compression stockings - Knee High; 20-30 mmHg compression 3 each 0     order for DME Donut Pillow 1 each 0     order for DME Equipment being ordered: Oral appliance for sleep apnea 1 Units 0     pantoprazole (PROTONIX) 40 MG EC tablet Take 40 mg by mouth 2 times daily       sitagliptin (JANUVIA) 50 MG tablet Take 50 mg by mouth daily       triamcinolone (KENALOG) 0.1 % cream Apply topically daily       OTC products: Aspirin    Allergies   Allergen Reactions     Nsaids Difficulty breathing     Increased creatinine     Toradol Difficulty breathing     Shortness of breath     Celecoxib Itching and Rash     Codeine Itching     With higher doses     Crestor [Rosuvastatin] Muscle Pain (Myalgia)     No Clinical Screening - See Comments Itching     Fragrance     Vioxx Other (See Comments)     Heart races     Conjugated Estrogens Rash     Sulfa Drugs Rash      Latex Allergy: NO    Social History     Tobacco Use     Smoking status: Never Smoker     Smokeless tobacco: Never Used   Substance Use Topics     Alcohol use: No     Alcohol/week: 0.0 oz     Comment: very rare     History   Drug Use No       REVIEW OF SYSTEMS:   Constitutional, neuro, ENT, endocrine, pulmonary, cardiac, gastrointestinal, genitourinary, musculoskeletal, integument and psychiatric systems are negative, except as otherwise noted.    EXAM:   /65 (BP Location: Left arm, Cuff Size: Adult Large)   Pulse 55   Temp 97.4  F (36.3  C) (Oral)   Ht 1.549 m (5' 1\")   Wt " 96.2 kg (212 lb)   SpO2 98%   BMI 40.06 kg/m      GENERAL APPEARANCE: healthy, alert and no distress     EYES: EOMI, PERRL     HENT: ear canals and TM's normal and nose and mouth without ulcers or lesions     NECK: no adenopathy, no asymmetry, masses, or scars and thyroid normal to palpation     RESP: lungs clear to auscultation - no rales, rhonchi or wheezes     CV: regular rates and rhythm, normal S1 S2, no S3 or S4 and no murmur, click or rub     ABDOMEN:  soft, nontender, + ostomy in tact, no HSM or masses and bowel sounds normal     MS: 2+ edema bilaterally, brace on left lower extremity .     SKIN: no suspicious lesions or rashes     NEURO: Normal strength and tone, sensory exam grossly normal, mentation intact and speech normal     PSYCH: mentation appears normal. and affect normal/bright     LYMPHATICS: No cervical adenopathy    DIAGNOSTICS:     EKG: sinus bradycardia, normal axis, normal intervals, no acute ST/T changes c/w ischemia, no LVH by voltage criteria, unchanged from previous tracings  Labs Resulted Today:   Results for orders placed or performed in visit on 05/16/19   Albumin Random Urine Quantitative with Creat Ratio   Result Value Ref Range    Creatinine Urine 31 mg/dL    Albumin Urine mg/L <5 mg/L    Albumin Urine mg/g Cr Unable to calculate due to low value 0 - 25 mg/g Cr   CBC with platelets   Result Value Ref Range    WBC 7.9 4.0 - 11.0 10e9/L    RBC Count 3.88 3.8 - 5.2 10e12/L    Hemoglobin 12.1 11.7 - 15.7 g/dL    Hematocrit 35.5 35.0 - 47.0 %    MCV 92 78 - 100 fl    MCH 31.2 26.5 - 33.0 pg    MCHC 34.1 31.5 - 36.5 g/dL    RDW 12.4 10.0 - 15.0 %    Platelet Count 190 150 - 450 10e9/L   Basic metabolic panel   Result Value Ref Range    Sodium 139 133 - 144 mmol/L    Potassium 5.0 3.4 - 5.3 mmol/L    Chloride 103 94 - 109 mmol/L    Carbon Dioxide 30 20 - 32 mmol/L    Anion Gap 6 3 - 14 mmol/L    Glucose 196 (H) 70 - 99 mg/dL    Urea Nitrogen 40 (H) 7 - 30 mg/dL    Creatinine 1.52 (H)  0.52 - 1.04 mg/dL    GFR Estimate 34 (L) >60 mL/min/[1.73_m2]    GFR Estimate If Black 39 (L) >60 mL/min/[1.73_m2]    Calcium 9.1 8.5 - 10.1 mg/dL     Labs Drawn and in Process:   Unresulted Labs Ordered in the Past 30 Days of this Admission     No orders found from 4/1/2019 to 6/1/2019.          Recent Labs   Lab Test 05/16/19  1303 03/29/19  1355 11/05/18  1413 10/08/18  1716  02/23/18  0625  11/18/13  1258   HGB 12.1  --   --  11.6*   < > 11.2*   < >  --      --   --  205   < > 233   < >  --    INR  --   --   --   --   --  0.98  --  2.00*    139 139 140   < > 134   < >  --    POTASSIUM 5.0 4.7 4.6 4.0   < > 4.0   < >  --    CR 1.52* 1.20* 1.17* 1.38*   < > 1.29*   < >  --    A1C  --  8.8* 8.1*  --   --   --    < >  --     < > = values in this interval not displayed.        IMPRESSION:   Reason for surgery/procedure: ano-rectal surgery  Diagnosis/reason for consult: Preoperative Evaluation    The proposed surgical procedure is considered INTERMEDIATE risk.    REVISED CARDIAC RISK INDEX  The patient has the following serious cardiovascular risks for perioperative complications such as (MI, PE, VFib and 3  AV Block):  Diabetes Mellitus (on Insulin)  INTERPRETATION: 1 risks: Class II (low risk - 0.9% complication rate)    The patient has the following additional risks for perioperative complications:  No identified additional risks      ICD-10-CM    1. Pre-op exam Z01.818    2. Preop general physical exam Z01.818        RECOMMENDATIONS:   (Z01.818) Pre-op exam  (primary encounter diagnosis)  Comment: you are medically optimized for your upcoming surgery pending labs.  Hold lisinopril on the day procedure.  Hold aspirin and all NSAIDs for 1 week leading up to surgery.  Take Trujeo at half normal dose on the day of procdure and hold Januvia the night prior.    Plan:   **  Rectal Stricture  Comment; surgery planned    (E11.69,  Z79.4) Type 2 diabetes mellitus with other specified complication, with  long-term current use of insulin (H)  Comment: As above - hold Regulouvia on day of procedure   Plan:     (N18.3) CKD (chronic kidney disease) stage 3, GFR 30-59 ml/min (H)  Comment: check basic metabolic panel today   Plan: Basic metabolic panel            (I10) Benign essential hypertension  Comment: blood pressure is a bit elevated, but may be too high - we will plan to increase lisinopril back to 40 mg and follow up in 1 wePlan: Basic metabolic panel                        Signed Electronically by: Shola Benoit MD, MD    Copy of this evaluation report is provided to requesting physician.    [place] Preop Guidelines    Revised Cardiac Risk Index

## 2019-06-01 ENCOUNTER — TELEPHONE (OUTPATIENT)
Dept: FAMILY MEDICINE | Facility: CLINIC | Age: 74
End: 2019-06-01

## 2019-06-01 RX ORDER — LISINOPRIL 40 MG/1
40 TABLET ORAL DAILY
Qty: 90 TABLET | Refills: 3 | Status: SHIPPED | OUTPATIENT
Start: 2019-06-01 | End: 2020-07-20

## 2019-06-01 NOTE — TELEPHONE ENCOUNTER
Can we call Chasity Hodgson and let her know that   Blood pressure is too high and I would recommend going back on lisinopril 40 mg and blood pressure should be rechecked prior to surgery.  Needs to be less than 140 systolic and 90 diastolic     Shola Benoit MD

## 2019-06-02 NOTE — RESULT ENCOUNTER NOTE
Gerard Gaspar,    I have had the opportunity to review your recent results and an interpretation is as follows:  Your follow up basic metabolic panel shows slightly improved renal function, OK to continue on blood pressure medications as we discussed and follow up in 1 week    Sincerely,  Shola Benoit MD

## 2019-06-03 NOTE — TELEPHONE ENCOUNTER
Called patient to inform about below, yet I left her a VM to call us back. Laury Gardner CMA on 6/3/2019 at 9:53 AM       Can we call Chasity Hodgson and let her know that   Blood pressure is too high and I would recommend going back on lisinopril 40 mg and blood pressure should be rechecked prior to surgery.  Needs to be less than 140 systolic and 90 diastolic      Shola Benoit MD

## 2019-06-03 NOTE — TELEPHONE ENCOUNTER
Patient called and received message and she will start the lisinopril right away and she is coming to get her BP Checked on Friday 6/3

## 2019-06-07 ENCOUNTER — ALLIED HEALTH/NURSE VISIT (OUTPATIENT)
Dept: NURSING | Facility: CLINIC | Age: 74
End: 2019-06-07
Payer: MEDICARE

## 2019-06-07 VITALS — OXYGEN SATURATION: 97 % | SYSTOLIC BLOOD PRESSURE: 145 MMHG | HEART RATE: 63 BPM | DIASTOLIC BLOOD PRESSURE: 63 MMHG

## 2019-06-07 DIAGNOSIS — I10 BENIGN ESSENTIAL HYPERTENSION: Primary | ICD-10-CM

## 2019-06-07 DIAGNOSIS — N18.30 CKD (CHRONIC KIDNEY DISEASE) STAGE 3, GFR 30-59 ML/MIN (H): ICD-10-CM

## 2019-06-07 PROCEDURE — 99207 ZZC NO CHARGE NURSE ONLY: CPT

## 2019-06-07 NOTE — PROGRESS NOTES
Chasity Hodgson is a 73 year old patient who comes in today for a Blood Pressure check.  Initial BP:  /63 (BP Location: Left arm, Patient Position: Chair, Cuff Size: Adult Regular)   Pulse 63   SpO2 97%      63  Disposition: results routed to provider

## 2019-06-10 ENCOUNTER — OFFICE VISIT (OUTPATIENT)
Dept: CARDIOLOGY | Facility: CLINIC | Age: 74
End: 2019-06-10
Attending: INTERNAL MEDICINE
Payer: MEDICARE

## 2019-06-10 VITALS
HEART RATE: 59 BPM | WEIGHT: 211 LBS | SYSTOLIC BLOOD PRESSURE: 153 MMHG | DIASTOLIC BLOOD PRESSURE: 60 MMHG | BODY MASS INDEX: 39.84 KG/M2 | HEIGHT: 61 IN

## 2019-06-10 DIAGNOSIS — R07.89 ATYPICAL CHEST PAIN: ICD-10-CM

## 2019-06-10 DIAGNOSIS — I10 BENIGN ESSENTIAL HYPERTENSION: Primary | ICD-10-CM

## 2019-06-10 PROCEDURE — 99214 OFFICE O/P EST MOD 30 MIN: CPT | Performed by: INTERNAL MEDICINE

## 2019-06-10 RX ORDER — EZETIMIBE 10 MG/1
10 TABLET ORAL DAILY
COMMUNITY
End: 2019-08-12

## 2019-06-10 ASSESSMENT — MIFFLIN-ST. JEOR: SCORE: 1404.47

## 2019-06-10 NOTE — LETTER
6/10/2019    Shola Benoit MD, MD  [address]  [facility] [address]    RE: Chasity Hodgson       Dear Colleague,    I had the pleasure of seeing Chasityseda Garciahl in the [facility].      Cardiology Consultation     Assessment & Plan     1.  Nonocclusive coronary disease by angiogram 2018  2.  Chronic kidney disease  3.  Hypertension  4.  PAD  5.  Diabetes mellitus  6.  Hyperlipidemia   7.  Hypothyroidism  8.  History of DVT  9.  ANNETTE  10.  History of polio age to right foot brace due to deformity  11.  History of colorectal surgery currently has a colostomy, is in need of a minor procedure, outpatient    Recommendations    Patient had EKG performed today demonstrating sinus bradycardia with no significant changes above baseline.  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  She is cleared for her upcoming colorectal procedure.  She had coronary angiography performed last year which did not demonstrate obstructive disease.  LV systolic function is preserved.  We will have her return to clinic in 1 years time.  He was a pleasure getting acquainted with her care and I look forward to seeing her next year.    Ankit Bhatia MD      HPI:    Patient is a very pleasant 73-year-old female who has been followed by my colleague Dr. Ortiz for several years.  Dr. Ortiz is transitioning her clinic to [facility] primarily.  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  Her cardiac history is notable for nonocclusive coronary artery disease by coronary angiogram in 2018.  This was performed for pre-operative clearance for right shoulder surgery.  She underwent her surgery without difficulty.  Her other past medical history is as noted above.  She has long-standing history of hypertension and is on a multidrug regimen.  She also has chronic kidney disease.  She is in need of a minor  colorectal procedure per patient.  This is an outpatient procedure that require some revision of her previous colostomy however through noninvasive measures.  Cardiac wise she is at baseline with no active symptoms.  Here to establish local care.        Primary Care Physician   Shola Benoit MD      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)     Anemia due to blood loss, acute     ACP (advance care planning)     Postsurgical hypothyroidism     Type 2 diabetes, HbA1c goal < 7% (H)     Gastroparesis     Papillary carcinoma of thyroid (H)     Alopecia     Pulmonary nodule     Esophageal reflux     Dermatitis     Acne rosacea     Diabetic gastroparesis (H)     Poliomyelitis     Left knee pain     Carotid stenosis     Right shoulder pain     Type 2 diabetes mellitus with other specified complication (H)     Insufficiency, arterial, peripheral (H)     Allergic dermatitis due to other chemical product     Normocytic anemia     Morbid obesity (H)     Mild major depression (H)     CKD (chronic kidney disease) stage 3, GFR 30-59 ml/min (H)       Past Medical History   I have reviewed this patient's medical history and updated it with pertinent information if needed.   Past Medical History:   Diagnosis Date     Abdominal adhesions 1984, 96,99    s/p lysis     Allergic rhinitis, cause unspecified      Carotid stenosis      CPAP (continuous positive airway pressure) dependence      Diabetic gastroparesis (H)      Diet-controlled type 2 diabetes mellitus (H)      DVT of axillary vein, acute right (H)      Fibromyalgia      Gastro-oesophageal reflux disease      Hernia of unspecified site of abdominal cavity without mention of obstruction or gangrene      bilateral     HTN (hypertension)      Hypertriglyceridemia      Obstructive sleep apnea      ANNETTE (obstructive sleep apnea)      Papillary carcinoma of thyroid (H)     s/p thyroidectomy - Ruegemer     PE (pulmonary embolism)      Poliomyelitis     poor circulation right leg     Postsurgical hypothyroidism     s/p papillary thryoid cancer - Ruegemer     Pulmonary embolism (H)      Rosacea      S/P carpal tunnel release     bilateral     S/P hardware removal 01/2014    still with lingering foot pain     S/P shoulder surgery     bilateral     Septic joint (H)     right knee     Venous insufficiency      Venous thrombosis 1999    right axillary vein       Past Surgical History   I have reviewed this patient's surgical history and updated it with pertinent information if needed.  Past Surgical History:   Procedure Laterality Date     AMPUTATE TOE(S)  3/15/2012    Procedure:AMPUTATE TOE(S); Surgeon:RUBEN MOSES; Location: OR     APPENDECTOMY  1972     ARTHRODESIS FOOT  3/15/2012    Procedure:ARTHRODESIS FOOT; RIGHT TRIPLE ARTHRODESIS, FIFTH TOE AMPUTATION, LATERAL LIGAMENT RECONSTRUCTION, TENDON TRANSFER AND RELEASE [MINI C-ARM, ARTHREX 4.5 AND 6.7 STAPLES, BIOCOMPOSITE TENODESIS SCREWS]; Surgeon:RUBEN MOSES; Location: OR     C FREEING BOWEL ADHESION,ENTEROLYSIS      1986, 1996, 1999     C NONSPECIFIC PROCEDURE      throidectomy     C TOTAL ABDOM HYSTERECTOMY  1980    + BSO     CHOLECYSTECTOMY       COLOSTOMY  2/7/2012    Procedure:COLOSTOMY; CREATION OF SIGMOID COLOSTOMY AND EXTENSIVE  LYSIS OF ADHESIONS; Surgeon:MONTSERRAT BENDER P; Location: OR     GI SURGERY      weakened rectal sphincter with artificial stimulator     LAPAROTOMY, LYSIS ADHESIONS, COMBINED  2/7/2012    Procedure:COMBINED LAPAROTOMY, LYSIS ADHESIONS; Surgeon:MONTSERRAT BENDER P; Location: OR     RELEASE TENDON FOOT  3/15/2012    Procedure:RELEASE TENDON FOOT; Surgeon:RUBEN MOSES; Location: OR     REMOVE  HARDWARE FOOT  12/13/2012    Procedure: REMOVE HARDWARE FOOT;  RIGHT FOOT REMOVAL OF HARDWARE;  Surgeon: Sukhdeep Metz MD;  Location:  OR       Prior to Admission Medications   Cannot display prior to admission medications because the patient has not been admitted in this contact.     [unfilled]  [unfilled]  Allergies   Allergies   Allergen Reactions     Nsaids Difficulty breathing     Increased creatinine     Toradol Difficulty breathing     Shortness of breath     Celecoxib Itching and Rash     Codeine Itching     With higher doses     Crestor [Rosuvastatin] Muscle Pain (Myalgia)     No Clinical Screening - See Comments Itching     Fragrance     Vioxx Other (See Comments)     Heart races     Conjugated Estrogens Rash     Sulfa Drugs Rash       Social History    reports that she has never smoked. She has never used smokeless tobacco. She reports that she does not drink alcohol or use drugs.    Family History   Family History   Problem Relation Age of Onset     Arthritis Mother      Hypertension Mother      Cerebrovascular Disease Mother      Obesity Mother      Heart Disease Mother         MI's     Hypertension Father      Respiratory Father         Adult RDS     Diabetes Father         adult     Arthritis Sister      Cancer Sister      Arthritis Sister      Hypertension Sister      Cancer Sister         colon polup     Heart Disease Brother         MI at 54     Hypertension Sister      Lipids Brother      Hypertension Brother      Lipids Sister      Obesity Sister      Obesity Maternal Grandmother      Skin Cancer Maternal Grandmother         skin cancer unknown     Cancer Maternal Grandmother         unknown skin cancer on face       Review of Systems   The comprehensive 10 point Review of Systems is negative other than noted in the HPI or here.     Physical Exam   Vital Signs with Ranges     Wt Readings from Last 4 Encounters:   06/10/19 95.7 kg (211 lb)   05/31/19 96.2 kg (212 lb)   05/17/19  "95.3 kg (210 lb)   04/16/19 94.8 kg (209 lb)     [unfilled]      Vitals:  Blood pressure 153/60, pulse 59, height 1.549 m (5' 1\"), weight 95.7 kg (211 lb), not currently breastfeeding.    Constitutional:   awake, alert, cooperative, no apparent distress, and appears stated age     Neck:   Supple, symmetrical, trachea midline, no adenopathy, thyroid symmetric, not enlarged and no tenderness, skin normal     Hematologic / Lymphatic:   no cervical lymphadenopathy     Lungs:   No increased work of breathing, good air exchange, clear to auscultation bilaterally, no crackles or wheezing     Cardiovascular:   Normal apical impulse, regular rate and rhythm, normal S1 and S2, no S3 or S4, and no murmur noted     Abdomen:   No scars, normal bowel sounds, soft, non-distended, non-tender, no masses palpated, no hepatosplenomegally     Neurologic:   Awake, alert, oriented to name, place and time.  Cranial nerves II-XII are grossly intact.  Motor is 5 out of 5 bilaterally.  Cerebellar finger to nose, heel to shin intact.  Sensory is intact.  Babinski down going, Romberg negative, and gait is normal.       @LABRCNTIPR(tropi:5,troponinies:5)@    @LABRCNTIPR(wbc:3,hgb:3,mcv:3,plt:3,inr:3,na:3,potassium:3,chloride:3,co2:3,bun:3,cr:3,gfrestimated:3,gfrestblack:3,aniongap:3,jules:3,glc:3,albumin:2,prottotal:2,bilitotal:2,alkphos:2,alt:2,ast:2,lipase:2,tropi:3)@  Recent Labs   Lab Test 03/29/19  1355 01/29/18  02/15/17  03/31/15  1327   CHOL 196 185   < > 142   < > 158   HDL 27* 39*   < > 31   < > 34*   LDL 99 105*  --  49   < > 51   TRIG 349* 290*   < > 309   < > 365*   CHOLHDLRATIO  --   --   --  4.6  --  4.6    < > = values in this interval not displayed. "     @LABRCNTIP(wbc:3,hgb:3,hct:3,mcv:3,plt:3,iron:3,ironsat:3,reticabsct:3,retp:3,feb:3,kitty:3,b12:3,folic:3,epoe:3,morph:3)@  @LABRCNTIP(PH:3,PHV:3,PO2:3,PO2V:3,sat:3,PCO2:3,PCO2V:3,HCO3:3,HCO3V:3)@  @LABRCNTIP(NTBNPI:3,NTBNP:3)@  @LABRCNTIP(DD:1)@  @LABRCNTIP(sed:3,crp:3)@  @LABRCNTIP(PLT:3)@  @LABRCNTIP(TSH:3)@  @LABRCNTIP(color:1,appearance:1,urineg,urinebili:1,urineketone:1,s,ubld:1,urineph:1,protein:1,urobilinogen:1,nitrite:1,leukest:1,rbcu:1,wbcu:1)@    Imaging:  No results found for this or any previous visit (from the past 48 hour(s)).    Echo:  No results found for this or any previous visit (from the past 4320 hour(s)).         Thank you for allowing me to participate in the care of your patient.    Sincerely,     Ankit Bhatia MD     [facility]

## 2019-06-10 NOTE — PROGRESS NOTES
Cardiology Consultation     Assessment & Plan     1.  Nonocclusive coronary disease by angiogram 2018  2.  Chronic kidney disease  3.  Hypertension  4.  PAD  5.  Diabetes mellitus  6.  Hyperlipidemia   7.  Hypothyroidism  8.  History of DVT  9.  ANNETTE  10.  History of polio age to right foot brace due to deformity  11.  History of colorectal surgery currently has a colostomy, is in need of a minor procedure, outpatient    Recommendations    Patient had EKG performed today demonstrating sinus bradycardia with no significant changes above baseline.  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  She is cleared for her upcoming colorectal procedure.  She had coronary angiography performed last year which did not demonstrate obstructive disease.  LV systolic function is preserved.  We will have her return to clinic in 1 years time.  He was a pleasure getting acquainted with her care and I look forward to seeing her next year.    Ankit Bhatia MD      HPI:    Patient is a very pleasant 73-year-old female who has been followed by my colleague Dr. Ortiz for several years.  Dr. Ortiz is transitioning her clinic to [facility] primarily.  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  Her cardiac history is notable for nonocclusive coronary artery disease by coronary angiogram in 2018.  This was performed for pre-operative clearance for right shoulder surgery.  She underwent her surgery without difficulty.  Her other past medical history is as noted above.  She has long-standing history of hypertension and is on a multidrug regimen.  She also has chronic kidney disease.  She is in need of a minor colorectal procedure per patient.  This is an outpatient procedure that require some revision of her previous colostomy however through noninvasive measures.  Cardiac wise she is at baseline with no active symptoms.  Here to establish  local care.        Primary Care Physician   Shola Benoit MD      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)     Anemia due to blood loss, acute     ACP (advance care planning)     Postsurgical hypothyroidism     Type 2 diabetes, HbA1c goal < 7% (H)     Gastroparesis     Papillary carcinoma of thyroid (H)     Alopecia     Pulmonary nodule     Esophageal reflux     Dermatitis     Acne rosacea     Diabetic gastroparesis (H)     Poliomyelitis     Left knee pain     Carotid stenosis     Right shoulder pain     Type 2 diabetes mellitus with other specified complication (H)     Insufficiency, arterial, peripheral (H)     Allergic dermatitis due to other chemical product     Normocytic anemia     Morbid obesity (H)     Mild major depression (H)     CKD (chronic kidney disease) stage 3, GFR 30-59 ml/min (H)       Past Medical History   I have reviewed this patient's medical history and updated it with pertinent information if needed.   Past Medical History:   Diagnosis Date     Abdominal adhesions 1984, 96,99    s/p lysis     Allergic rhinitis, cause unspecified      Carotid stenosis      CPAP (continuous positive airway pressure) dependence      Diabetic gastroparesis (H)      Diet-controlled type 2 diabetes mellitus (H)      DVT of axillary vein, acute right (H)      Fibromyalgia      Gastro-oesophageal reflux disease      Hernia of unspecified site of abdominal cavity without mention of obstruction or gangrene     bilateral     HTN (hypertension)      Hypertriglyceridemia      Obstructive sleep apnea      ANNETTE (obstructive sleep apnea)      Papillary carcinoma of thyroid (H)     s/p thyroidectomy - Ruegemer     PE (pulmonary embolism)      Poliomyelitis      poor circulation right leg     Postsurgical hypothyroidism     s/p papillary thryoid cancer - Ruegemer     Pulmonary embolism (H)      Rosacea      S/P carpal tunnel release     bilateral     S/P hardware removal 01/2014    still with lingering foot pain     S/P shoulder surgery     bilateral     Septic joint (H)     right knee     Venous insufficiency      Venous thrombosis 1999    right axillary vein       Past Surgical History   I have reviewed this patient's surgical history and updated it with pertinent information if needed.  Past Surgical History:   Procedure Laterality Date     AMPUTATE TOE(S)  3/15/2012    Procedure:AMPUTATE TOE(S); Surgeon:SUKHDEEP METZ; Location: OR     APPENDECTOMY  1972     ARTHRODESIS FOOT  3/15/2012    Procedure:ARTHRODESIS FOOT; RIGHT TRIPLE ARTHRODESIS, FIFTH TOE AMPUTATION, LATERAL LIGAMENT RECONSTRUCTION, TENDON TRANSFER AND RELEASE [MINI C-ARM, ARTHREX 4.5 AND 6.7 STAPLES, BIOCOMPOSITE TENODESIS SCREWS]; Surgeon:SUKHDEEP METZ; Location: OR      FREEING BOWEL ADHESION,ENTEROLYSIS      1986, 1996, 1999     C NONSPECIFIC PROCEDURE      throidectomy     C TOTAL ABDOM HYSTERECTOMY  1980    + BSO     CHOLECYSTECTOMY       COLOSTOMY  2/7/2012    Procedure:COLOSTOMY; CREATION OF SIGMOID COLOSTOMY AND EXTENSIVE  LYSIS OF ADHESIONS; Surgeon:MONTSERRAT BENDER; Location: OR     GI SURGERY      weakened rectal sphincter with artificial stimulator     LAPAROTOMY, LYSIS ADHESIONS, COMBINED  2/7/2012    Procedure:COMBINED LAPAROTOMY, LYSIS ADHESIONS; Surgeon:MONTSERRAT BENDER; Location: OR     RELEASE TENDON FOOT  3/15/2012    Procedure:RELEASE TENDON FOOT; Surgeon:SUKHDEEP METZ; Location: OR     REMOVE HARDWARE FOOT  12/13/2012    Procedure: REMOVE HARDWARE FOOT;  RIGHT FOOT REMOVAL OF HARDWARE;  Surgeon: Sukhdeep Metz MD;  Location:  OR       Prior to Admission Medications   Cannot display prior to admission medications because  "the patient has not been admitted in this contact.     [unfilled]  [unfilled]  Allergies   Allergies   Allergen Reactions     Nsaids Difficulty breathing     Increased creatinine     Toradol Difficulty breathing     Shortness of breath     Celecoxib Itching and Rash     Codeine Itching     With higher doses     Crestor [Rosuvastatin] Muscle Pain (Myalgia)     No Clinical Screening - See Comments Itching     Fragrance     Vioxx Other (See Comments)     Heart races     Conjugated Estrogens Rash     Sulfa Drugs Rash       Social History    reports that she has never smoked. She has never used smokeless tobacco. She reports that she does not drink alcohol or use drugs.    Family History   Family History   Problem Relation Age of Onset     Arthritis Mother      Hypertension Mother      Cerebrovascular Disease Mother      Obesity Mother      Heart Disease Mother         MI's     Hypertension Father      Respiratory Father         Adult RDS     Diabetes Father         adult     Arthritis Sister      Cancer Sister      Arthritis Sister      Hypertension Sister      Cancer Sister         colon polup     Heart Disease Brother         MI at 54     Hypertension Sister      Lipids Brother      Hypertension Brother      Lipids Sister      Obesity Sister      Obesity Maternal Grandmother      Skin Cancer Maternal Grandmother         skin cancer unknown     Cancer Maternal Grandmother         unknown skin cancer on face       Review of Systems   The comprehensive 10 point Review of Systems is negative other than noted in the HPI or here.     Physical Exam   Vital Signs with Ranges     Wt Readings from Last 4 Encounters:   06/10/19 95.7 kg (211 lb)   05/31/19 96.2 kg (212 lb)   05/17/19 95.3 kg (210 lb)   04/16/19 94.8 kg (209 lb)     [unfilled]      Vitals:  Blood pressure 153/60, pulse 59, height 1.549 m (5' 1\"), weight 95.7 kg (211 lb), not currently breastfeeding.    Constitutional:   awake, alert, cooperative, no " apparent distress, and appears stated age     Neck:   Supple, symmetrical, trachea midline, no adenopathy, thyroid symmetric, not enlarged and no tenderness, skin normal     Hematologic / Lymphatic:   no cervical lymphadenopathy     Lungs:   No increased work of breathing, good air exchange, clear to auscultation bilaterally, no crackles or wheezing     Cardiovascular:   Normal apical impulse, regular rate and rhythm, normal S1 and S2, no S3 or S4, and no murmur noted     Abdomen:   No scars, normal bowel sounds, soft, non-distended, non-tender, no masses palpated, no hepatosplenomegally     Neurologic:   Awake, alert, oriented to name, place and time.  Cranial nerves II-XII are grossly intact.  Motor is 5 out of 5 bilaterally.  Cerebellar finger to nose, heel to shin intact.  Sensory is intact.  Babinski down going, Romberg negative, and gait is normal.       @LABRCNTIPR(tropi:5,troponinies:5)@    @LABRCNTIPR(wbc:3,hgb:3,mcv:3,plt:3,inr:3,na:3,potassium:3,chloride:3,co2:3,bun:3,cr:3,gfrestimated:3,gfrestblack:3,aniongap:3,jules:3,glc:3,albumin:2,prottotal:2,bilitotal:2,alkphos:2,alt:2,ast:2,lipase:2,tropi:3)@  Recent Labs   Lab Test 19  1355 01/29/18  02/15/17  03/31/15  1327   CHOL 196 185   < > 142   < > 158   HDL 27* 39*   < > 31   < > 34*   LDL 99 105*  --  49   < > 51   TRIG 349* 290*   < > 309   < > 365*   CHOLHDLRATIO  --   --   --  4.6  --  4.6    < > = values in this interval not displayed.     @LABRCNTIP(wbc:3,hgb:3,hct:3,mcv:3,plt:3,iron:3,ironsat:3,reticabsct:3,retp:3,feb:3,kitty:3,b12:3,folic:3,epoe:3,morph:3)@  @LABRCNTIP(PH:3,PHV:3,PO2:3,PO2V:3,sat:3,PCO2:3,PCO2V:3,HCO3:3,HCO3V:3)@  @LABRCNTIP(NTBNPI:3,NTBNP:3)@  @LABRCNTIP(DD:1)@  @LABRCNTIP(sed:3,crp:3)@  @LABRCNTIP(PLT:3)@  @LABRCNTIP(TSH:3)@  @LABRCNTIP(color:1,appearance:1,urineg,urinebili:1,urineketone:1,s,ubld:1,urineph:1,protein:1,urobilinogen:1,nitrite:1,leukest:1,rbcu:1,wbcu:1)@    Imaging:  No results found for this or any  previous visit (from the past 48 hour(s)).    Echo:  No results found for this or any previous visit (from the past 4320 hour(s)).

## 2019-07-10 ENCOUNTER — ALLIED HEALTH/NURSE VISIT (OUTPATIENT)
Dept: FAMILY MEDICINE | Facility: CLINIC | Age: 74
End: 2019-07-10
Payer: MEDICARE

## 2019-07-10 VITALS — SYSTOLIC BLOOD PRESSURE: 124 MMHG | DIASTOLIC BLOOD PRESSURE: 72 MMHG

## 2019-07-10 DIAGNOSIS — I10 BENIGN ESSENTIAL HYPERTENSION: Primary | ICD-10-CM

## 2019-07-10 PROCEDURE — 99207 ZZC NO CHARGE NURSE ONLY: CPT | Performed by: INTERNAL MEDICINE

## 2019-07-10 NOTE — PROGRESS NOTES
Chasity Hodgson was evaluated at [facility] on July 10, 2019 at which time her blood pressure was:    BP Readings from Last 3 Encounters:   07/10/19 124/72   06/10/19 153/60   06/07/19 145/63     Pulse Readings from Last 3 Encounters:   06/10/19 59   06/07/19 63   05/31/19 55       Reviewed lifestyle modifications for blood pressure control and reduction: including making healthy food choices, managing weight, getting regular exercise, smoking cessation, reducing alcohol consumption, monitoring blood pressure regularly.     Symptoms: None    BP Goal:< 140/90 mmHg    BP Assessment:  BP at goal    Potential Reasons for BP too high: NA - Not applicable    BP Follow-Up Plan: Recheck BP in 6 months at pharmacy    Recommendation to Provider: recheck in 6 months    Note completed by: Nichelle Hernandez, PharmD  [facility]

## 2019-07-17 ENCOUNTER — TRANSFERRED RECORDS (OUTPATIENT)
Dept: HEALTH INFORMATION MANAGEMENT | Facility: CLINIC | Age: 74
End: 2019-07-17

## 2019-08-05 ENCOUNTER — NURSE TRIAGE (OUTPATIENT)
Dept: FAMILY MEDICINE | Facility: CLINIC | Age: 74
End: 2019-08-05

## 2019-08-05 ENCOUNTER — HOSPITAL ENCOUNTER (EMERGENCY)
Facility: CLINIC | Age: 74
Discharge: HOME | End: 2019-08-05
Attending: EMERGENCY MEDICINE | Admitting: EMERGENCY MEDICINE
Payer: MEDICARE

## 2019-08-05 VITALS
OXYGEN SATURATION: 100 % | BODY MASS INDEX: 39.87 KG/M2 | DIASTOLIC BLOOD PRESSURE: 96 MMHG | RESPIRATION RATE: 18 BRPM | WEIGHT: 211 LBS | HEART RATE: 63 BPM | SYSTOLIC BLOOD PRESSURE: 187 MMHG | TEMPERATURE: 97.2 F

## 2019-08-05 DIAGNOSIS — T38.3X5A ADVERSE EFFECT OF INSULIN, INITIAL ENCOUNTER: ICD-10-CM

## 2019-08-05 LAB
ANION GAP SERPL CALCULATED.3IONS-SCNC: 3 MMOL/L (ref 3–14)
BASOPHILS # BLD AUTO: 0 10E9/L (ref 0–0.2)
BASOPHILS NFR BLD AUTO: 0.2 %
BUN SERPL-MCNC: 47 MG/DL (ref 7–30)
CALCIUM SERPL-MCNC: 8.9 MG/DL (ref 8.5–10.1)
CHLORIDE SERPL-SCNC: 108 MMOL/L (ref 94–109)
CO2 SERPL-SCNC: 28 MMOL/L (ref 20–32)
CREAT SERPL-MCNC: 1.03 MG/DL (ref 0.52–1.04)
DIFFERENTIAL METHOD BLD: ABNORMAL
EOSINOPHIL # BLD AUTO: 0 10E9/L (ref 0–0.7)
EOSINOPHIL NFR BLD AUTO: 0.2 %
ERYTHROCYTE [DISTWIDTH] IN BLOOD BY AUTOMATED COUNT: 12.4 % (ref 10–15)
GFR SERPL CREATININE-BSD FRML MDRD: 54 ML/MIN/{1.73_M2}
GLUCOSE BLDC GLUCOMTR-MCNC: 104 MG/DL (ref 70–99)
GLUCOSE BLDC GLUCOMTR-MCNC: 116 MG/DL (ref 70–99)
GLUCOSE BLDC GLUCOMTR-MCNC: 123 MG/DL (ref 70–99)
GLUCOSE BLDC GLUCOMTR-MCNC: 126 MG/DL (ref 70–99)
GLUCOSE BLDC GLUCOMTR-MCNC: 164 MG/DL (ref 70–99)
GLUCOSE SERPL-MCNC: 150 MG/DL (ref 70–99)
HCT VFR BLD AUTO: 37.7 % (ref 35–47)
HGB BLD-MCNC: 12.4 G/DL (ref 11.7–15.7)
IMM GRANULOCYTES # BLD: 0.1 10E9/L (ref 0–0.4)
IMM GRANULOCYTES NFR BLD: 0.9 %
LYMPHOCYTES # BLD AUTO: 1.7 10E9/L (ref 0.8–5.3)
LYMPHOCYTES NFR BLD AUTO: 11.3 %
MCH RBC QN AUTO: 31.2 PG (ref 26.5–33)
MCHC RBC AUTO-ENTMCNC: 32.9 G/DL (ref 31.5–36.5)
MCV RBC AUTO: 95 FL (ref 78–100)
MONOCYTES # BLD AUTO: 1 10E9/L (ref 0–1.3)
MONOCYTES NFR BLD AUTO: 6.7 %
NEUTROPHILS # BLD AUTO: 12.2 10E9/L (ref 1.6–8.3)
NEUTROPHILS NFR BLD AUTO: 80.7 %
NRBC # BLD AUTO: 0 10*3/UL
NRBC BLD AUTO-RTO: 0 /100
PLATELET # BLD AUTO: 200 10E9/L (ref 150–450)
POTASSIUM SERPL-SCNC: 5 MMOL/L (ref 3.4–5.3)
RBC # BLD AUTO: 3.97 10E12/L (ref 3.8–5.2)
SODIUM SERPL-SCNC: 139 MMOL/L (ref 133–144)
WBC # BLD AUTO: 15.1 10E9/L (ref 4–11)

## 2019-08-05 PROCEDURE — 85025 COMPLETE CBC W/AUTO DIFF WBC: CPT | Performed by: EMERGENCY MEDICINE

## 2019-08-05 PROCEDURE — 99284 EMERGENCY DEPT VISIT MOD MDM: CPT

## 2019-08-05 PROCEDURE — 80048 BASIC METABOLIC PNL TOTAL CA: CPT | Performed by: EMERGENCY MEDICINE

## 2019-08-05 PROCEDURE — 93005 ELECTROCARDIOGRAM TRACING: CPT

## 2019-08-05 PROCEDURE — 00000146 ZZHCL STATISTIC GLUCOSE BY METER IP

## 2019-08-05 ASSESSMENT — ENCOUNTER SYMPTOMS
DIAPHORESIS: 1
WEAKNESS: 1

## 2019-08-05 NOTE — TELEPHONE ENCOUNTER
"Patient rechecked blood sugar during phone conversation after having a snack of orange juice and sesame sticks.  Blood sugar: 45mg/dL.  Patient answering questions appropriately; knows the names of her medications and the correct dosing and timing.  Her voice sounds somewhat slow, weak and she admits to \"tired\".   Spoke to spouse, Don while patient eating her honey and orange juice..  He states she is talking coherently, \"but was really weak and sort of confused earlier today\".  I advised 911 now, due to patient's low blood sugar persisting for much of afternoon.  Apparently she slept much of day and has barely eaten.   Reason for Disposition    Sounds like a life-threatening emergency to the triager     Due to persistent low blood sugar most of day, needs intervention asap.    Very weak (can't stand)     Able to stand but probably not able to go down steps to their car. I advised 911    Vomiting and signs of dehydration (e.g., no urine > 12 hours, very dry mouth, dark urine, etc.)     Uncertain. Feels she urinated X 1 today    Additional Information    Negative: Unconscious or difficult to awaken    Negative: Seizure occurs    Negative: Acting confused (e.g., disoriented, slurred speech)      states \"confused/sleepy earlier today\"    Negative: Low blood glucose persists > 30 minutes AND using low blood sugar Care Advice    Negative: Diabetes medication overdose (e.g., insulin error) and triager unable to answer question    Negative: Caller has URGENT medication question about med that PCP prescribed and triager unable to answer question    Negative: Low blood sugar symptoms with no other adult present AND hasn't tried Care Advice    Negative: Low blood glucose (< 70 mg/dl or 3.9 mmol/l) with no other adult present AND hasn't tried Care Advice    Negative: Low blood glucose (< 70 mg/dl or 3.9 mmol/l) or symptomatic, now improved with Care Advice AND cause unknown    Blood glucose < 70 mg/dl (3.9 mmol/l) or " symptomatic AND has other adult present    Negative: Low blood sugar symptoms persist > 30 minutes AND using low blood sugar Care Advice     Recheck blood sugar :45 mg/dL during conversation.    Patient sounds very sick or weak to the triager    Protocols used: DIABETES - LOW BLOOD SUGAR-A-OH

## 2019-08-05 NOTE — TELEPHONE ENCOUNTER
Reason for call:  Patient reporting a symptom    Symptom or request: her Blood Sugar level this morning was 63 and she felt dizzy but now when she took it  It was 41 a few minutes ago and she would like to know what should she do?    Duration (how long have symptoms been present): since this Morning    Phone Number patient can be reached at:  Home number on file [phone] (home)    Best Time:  anytime    Can we leave a detailed message on this number:  YES    Call taken on 8/5/2019 at 4:38 PM by Jimmie Gabriel

## 2019-08-05 NOTE — ED AVS SNAPSHOT
[facility]  [address]  [facility] [address]  Phone:  [phone]  Fax:  [phone]                                    Chasity Hodgson   MRN: [id number]    Department:  [facility]   Date of Visit:  8/5/2019           After Visit Summary Signature Page    I have received my discharge instructions, and my questions have been answered. I have discussed any challenges I see with this plan with the nurse or doctor.    ..........................................................................................................................................  Patient/Patient Representative Signature      ..........................................................................................................................................  Patient Representative Print Name and Relationship to Patient    ..................................................               ................................................  Date                                   Time    ..........................................................................................................................................  Reviewed by Signature/Title    ...................................................              ..............................................  Date                                               Time          22EPIC Rev 08/18

## 2019-08-05 NOTE — ED TRIAGE NOTES
Diabetic pt presents with blood sugar issues today.  Woke up at 60 and took normal insulin dose, subsequently slept for most of the day and woke up in the afternoon at 40 BS.  Ate some food and now  in triage.  Still feeling 'off', generalized weakness, 'shaky' and tired.

## 2019-08-06 LAB — INTERPRETATION ECG - MUSE: NORMAL

## 2019-08-06 NOTE — ED PROVIDER NOTES
History     Chief Complaint:  Generalized Weakness    HPI   Chasity Hodgson is a 73 year old female with a history of hypertension, hyperlipidemia, type II diabetes mellitus, and chronic kidney disease who presents accompanied by her , Mason, for evaluation of generalized weakness. For the last several weeks the patient has generally woken up with blood sugars below 100 but she has not had any recent changes to her insulin or other medications. She has discussed this with her endocrinologist who advised her to keep taking her regular doses of insulin. This morning around 0900 the patient awoke and checked her blood sugar finding it to be 63. At that time, the patient felt weak, shaky, warm, and diaphoretic. She then took her normal 48 unit dose of long-acting insulin Glargine and ate breakfast. This afternoon around 1600 after waking up from a nap the patient had a blood sugar of 41. Due to this, the patient called EMS and ate a snack of peanut butter, honey, and orange juice. When EMS arrived her blood sugar had risen to 83. Following this episode, the patient attempted to contact her endocrinology clinic but they were closed, and due to her low blood sugars today she came into the ED for evaluation. Currently in the ED, the patient reports that she is feeling improved. Otherwise, the patient reports that she has been feeling well recently.     Allergies:  NSAIDs   Toradol   Celecoxib  Codeine  Crestor  Vioxx   Conjugated estrogens  Sulfa drugs      Medications:    aspirin (SB LOW DOSE ASA EC) 81 MG EC tablet  atenolol (TENORMIN) 50 MG tablet  Azelastine HCl (ASTEPRO NA)  BIOTIN PO  Cholecalciferol (VITAMIN D-3 PO)  clotrimazole (LOTRIMIN) 1 % cream  diphenhydrAMINE-acetaminophen (TYLENOL PM)  MG tablet  ezetimibe (ZETIA) 10 MG tablet  famotidine (PEPCID) 40 MG tablet  fenofibrate (TRICOR) 145 MG tablet  ferrous sulfate (IRON) 325 (65 FE) MG tablet  fexofenadine (ALLEGRA) 180 MG tablet  fluticasone  (FLONASE) 50 MCG/ACT spray  furosemide (LASIX) 20 MG tablet  gabapentin (NEURONTIN) 100 MG capsule  insulin glargine U-300 (TOUJEO SOLOSTAR) 300 UNIT/ML injection  levothyroxine (SYNTHROID) 112 MCG tablet  lisinopril (PRINIVIL/ZESTRIL) 40 MG tablet  metoclopramide (REGLAN) 5 MG tablet  MULTIVITAMINS OR TABS  nitroGLYcerin (NITROSTAT) 0.4 MG sublingual tablet  nystatin (MYCOSTATIN) cream  nystatin-triamcinolone (MYCOLOG II) cream  ondansetron (ZOFRAN) 4 MG tablet  pantoprazole (PROTONIX) 40 MG EC tablet  sitagliptin (JANUVIA) 50 MG tablet  triamcinolone (KENALOG) 0.1 % cream     Past Medical History:    Abdominal adhesions  Carotid stenosis   CPAP dependence  Diabetic gastroparesis  Diabetes mellitus, type II   DVT   Fibromyalgia  Depression  Chronic kidney disease, stage III   Papillary carcinoma of thyroid   GERD   Hernia   Hypertension   Hyperlipidemia  Obstructive sleep apnea  Papillary carcinoma of thyroid   Pulmonary embolism  Poliomyelitis  Postsurgical hypothyroidism  Rosacea   Septic joint     Past Surgical History:    Amputate toes  Appendectomy  Arthrodesis foot   Freeing bowel adhesions, enterolysis   Thyroidectomy   Total abdominal hysterectomy   Cholecystectomy  Colostomy   GI surgery, artifical stimulator to weakened rectal sphincter    Laparotomy, lysis adhesions, combined  Release tendon foot  Remove hardware foot     Family History:    Arthritis - Mother and sister   Hypertension - Mother, father, sister, and brother   Cerebrovascular disease - Mother   Obesity - Mother and sister   Heart disease - Mother and brother   RDS - Father   Diabetes - Father   Lipids - Brother and sister     Social History:  Tobacco use:    Never smoker   Alcohol use:    Negative   Marital status:       Accompanied to ED by:        Review of Systems   Constitutional: Positive for diaphoresis.   Neurological: Positive for weakness.   All other systems reviewed and are negative.      Physical Exam   First  Vitals:  BP: (!) 174/77  Pulse: 55  Temp: 97.2  F (36.2  C)  Resp: 18  Weight: 95.7 kg (211 lb)  SpO2: 100 %      Physical Exam  General: Resting on the gurney  HEENT:   The scalp and head appear normal    The pupils are equal, round, and reactive to light    Extraocular muscles are intact.    The nose is normal.    The oropharynx is normal.      Uvula is in the midline.  There is no peritonsillar abscess.  Neck:  Normal range of motion.    Lungs:  Clear.      No rales, no wheezing.      There is no tachypnea.  Non-labored.  Cardiac: Regular rate.      Normal S1 and S2.      No S3 or S4.      No pathological murmur.      No pericardial rub.  Abdomen: Soft. No distension. No tympani. No rebound. Non-tender.  Lymph: No anterior or posterior cervical lymphadenopathy noted.  MS:  Normal tone.      Normal movement of all extremities.    Neuro:  Normal mentation.  No focal motor or sensory changes.      Speech normal.  Psych:  Awake.     Alert.      Normal affect.      Appropriate interactions.  Skin:  No rash.      No lesions.    Emergency Department Course   ECG (19:15:38):  Indication: Screening for cardiovascular disease.   Rate 48 bpm. SD interval 142 ms. QRS duration 94 ms. QT/QTc 444/396 ms. P-R-T axes 16 5 23.   Interpretation: Sinus bradycardia, Otherwise normal Rhythm   Agree with computer interpretation. Yes    Interpreted at 2105 by Dr. May.      Laboratory:  CBC: WBC 15.1 high, o/w WNL (HGB 12.4, )   BMP: Glucose 150 high, BUN 47 high, GFR estimate 54 low, o/w WNL (Creatinine 1.03)   Glucose by meter 1853: 126 high   Glucose by meter 2024: 123 high   Glucose by meter 2136: 104 high   Glucose by meter 2202: 116 high   Glucose by meter 2237: 164 high     Emergency Department Course:  Nursing notes and vitals reviewed.  2103: I performed an exam of the patient as documented above.     2320: I updated and reassessed the patient.     I personally reviewed the laboratory results with the patient and   and answered all related questions prior to discharge.     Findings and plan explained to the Patient and spouse. Patient discharged home with instructions regarding supportive care, medications, and reasons to return. The importance of close follow-up was reviewed.     Impression & Plan      Medical Decision Making:  Chasity Hodgson is a 73 year old female type II diabetic who takes long-acting Glargine every morning, 48 units, and takes oral Ezetimibe in the evening, 10 mg. She had an insulin reaction today. She has noticed the last two weeks that her morning glucose checks are low. She has never had them below 100 but over the last two weeks most of them have been less than 100.     She awoke this morning and felt just slightly lightheaded. She ate a meal and then fell asleep for five hours. When she woke up she felt weak, checked her blood sugar and it was 41, and then called 911. She ate some food and her blood sugar was improved by the time the medics got there. She has been evaluated here and has been found to have normal blood sugars although she has been eating small amounts frequently and I have directed her to hold her oral hypoglycemic agent tonight and not to start her Glargine tomorrow morning until she talks with her endocrinologist or her primary medical doctor. If she were to give another dose I would recommend that she give significantly less than what she has been taking but only if her blood sugar is >100 in the morning. Again, I think the most prudent course of action would be to speak with either the endocrinologist or her primary medical doctor in the morning prior to giving her long acting insulin.     Diagnosis:    ICD-10-CM   1. Adverse effect of insulin, initial encounter T38.3X5A     Disposition:  Discharged to home.       I, Americo Tillman, am serving as a scribe at 9:03 PM on 8/5/2019 to document services personally performed by Dr. May, based on my observations and the  provider's statements to me.    [facility] EMERGENCY DEPARTMENT       Cory May MD  08/06/19 0131

## 2019-08-06 NOTE — DISCHARGE INSTRUCTIONS
Hold Ezetimibe tonight.  Talk with your endocrinologist or PCP before restarting your long acting insulin tomorrow.

## 2019-08-12 ENCOUNTER — OFFICE VISIT (OUTPATIENT)
Dept: FAMILY MEDICINE | Facility: CLINIC | Age: 74
End: 2019-08-12
Payer: MEDICARE

## 2019-08-12 VITALS
WEIGHT: 208.9 LBS | HEIGHT: 61 IN | TEMPERATURE: 98 F | HEART RATE: 54 BPM | OXYGEN SATURATION: 98 % | BODY MASS INDEX: 39.44 KG/M2 | SYSTOLIC BLOOD PRESSURE: 141 MMHG | DIASTOLIC BLOOD PRESSURE: 63 MMHG

## 2019-08-12 DIAGNOSIS — I10 BENIGN ESSENTIAL HYPERTENSION: ICD-10-CM

## 2019-08-12 DIAGNOSIS — G47.33 OSA (OBSTRUCTIVE SLEEP APNEA): ICD-10-CM

## 2019-08-12 DIAGNOSIS — E11.69 TYPE 2 DIABETES MELLITUS WITH OTHER SPECIFIED COMPLICATION, WITH LONG-TERM CURRENT USE OF INSULIN (H): Primary | ICD-10-CM

## 2019-08-12 DIAGNOSIS — E78.2 MIXED HYPERLIPIDEMIA: ICD-10-CM

## 2019-08-12 DIAGNOSIS — F32.0 MILD MAJOR DEPRESSION (H): ICD-10-CM

## 2019-08-12 DIAGNOSIS — Z79.4 TYPE 2 DIABETES MELLITUS WITH OTHER SPECIFIED COMPLICATION, WITH LONG-TERM CURRENT USE OF INSULIN (H): Primary | ICD-10-CM

## 2019-08-12 PROCEDURE — 99213 OFFICE O/P EST LOW 20 MIN: CPT | Performed by: INTERNAL MEDICINE

## 2019-08-12 RX ORDER — EZETIMIBE 10 MG/1
10 TABLET ORAL DAILY
Qty: 90 TABLET | Refills: 3 | Status: SHIPPED | OUTPATIENT
Start: 2019-08-12 | End: 2020-11-04

## 2019-08-12 ASSESSMENT — MIFFLIN-ST. JEOR: SCORE: 1394.94

## 2019-08-12 NOTE — PROGRESS NOTES
"Subjective     Chasity Hodgson is a 73 year old female who presents to clinic today for the following health issues:    HPI   ED/UC Followup:    Facility:  [facility]  Date of visit: 8-5-2019  Reason for visit:Generalized Weakness  Current Status: Intermittent decreased blood sugar     [facility]  CLINIC PROGRESS NOTE    Subjective:  Diabetes mellitus   Chasity Hodgson was recently seen in the emergency department for hypoglycemia.  On 08/05 she awoke and checked her blood sugar finding it to be 63. At that time, the patient felt weak, shaky, warm, and diaphoretic. She then took her normal 48 unit dose of long-acting insulin Glargine and ate breakfast. That afternoon after waking up from a nap the patient had a blood sugar of 41.  She was advised to hold insulin glargine the following day.   Noted endocrinology reduced her dose of Trujeo to 28 units.  Hypertension   She has had higher blood pressure readings.  Today she has some left arm pain which may be driving her blood pressure up.   She is taking lisinopril and atenolol and lasix.   She did not take her lasix today.        Past medical history, medications, allergies, social history, family history reviewed and updated in [facility] as of 8/12/2019 .    ROS  CONSTITUTIONAL: + fatigue, no unexpected change in weight  SKIN: no worrisome rashes, no worrisome moles, no worrisome lesions  EYES: no acute vision problems or changes  ENT: no ear problems, no mouth problems, no throat problems  RESP: no significant cough, no shortness of breath  CV: no chest pain, no palpitations,   GI: no nausea, no vomiting, no constipation, no diarrhea  : no frequency, no dysuria, no hematuria  MS: chronic lower leg pain  PSYCHIATRIC: mood has been stable      Objective:  Vitals  BP (!) 141/63 (BP Location: Right arm, Cuff Size: Adult Large)   Pulse 54   Temp 98  F (36.7  C) (Oral)   Ht 1.549 m (5' 1\")   Wt 94.8 kg (208 lb 14.4 oz)   SpO2 98%   BMI 39.47 kg/m    GEN: " Alert Oriented x3 NAD  HEENT: Atraumatic, normocephalic, neck supple, no thyromegaly, negative cervical adenopathy  TM: TM bilaterally pearly and grey with normal light reflex  CV: RRR no murmurs or rubs  PULM: CTA no wheezes or crackles  ABD: Soft, nontender nondistended, no hepatosplenomegally  SKIN: No visible skin lesion or ulcerations  EXT: trace edema bilateral lower extremities  NEURO: Gait and station deferred, No focal neurologic deficits  PSYCH: Mood good, affect mood congruent    No images are attached to the encounter.    No results found for this or any previous visit (from the past 24 hour(s)).    Assessment/Plan:  Patient Instructions   (E11.69) Type 2 diabetes mellitus with other specified complication, with long-term current use of insulin (H)  (primary encounter diagnosis)  Comment: We will refer to diabetic education for continuous glucose monitoring.  I would recommend continued healthy diet and exercise.  No change in medications  Plan: DIABETES EDUCATOR REFERRAL           hypertension  Comment; blood pressure is quite elevated and we will plan to recheck in 2 weeks     Follow up in 2 weeks    Disclaimer: This note consists of symbols derived from keyboarding, dictation and/or voice recognition software. As a result, there may be errors in the script that have gone undetected. Please consider this when interpreting information found in this chart.    Shola Benoit MD  [phone]

## 2019-08-12 NOTE — PATIENT INSTRUCTIONS
(E11.69) Type 2 diabetes mellitus with other specified complication, with long-term current use of insulin (H)  (primary encounter diagnosis)  Comment: We will refer to diabetic education for continuous glucose monitoring.  I would recommend continued healthy diet and exercise.  No change in medications  Plan: DIABETES EDUCATOR REFERRAL           hypertension  Comment; blood pressure is quite elevated and we will plan to recheck in 2 weeks

## 2019-08-22 ENCOUNTER — ALLIED HEALTH/NURSE VISIT (OUTPATIENT)
Dept: EDUCATION SERVICES | Facility: CLINIC | Age: 74
End: 2019-08-22
Payer: MEDICARE

## 2019-08-22 DIAGNOSIS — E11.9 TYPE 2 DIABETES, HBA1C GOAL < 7% (H): Primary | ICD-10-CM

## 2019-08-22 PROCEDURE — 95250 CONT GLUC MNTR PHYS/QHP EQP: CPT

## 2019-08-22 PROCEDURE — G0108 DIAB MANAGE TRN  PER INDIV: HCPCS

## 2019-08-22 NOTE — PROGRESS NOTES
Diabetes Self-Management Education & Support    Professional Continuous Glucose Monitor Insertion    SUBJECTIVE/OBJECTIVE:     Chasity Hodgson presents for professional Continuous Glucose Monitor Insertion.     Patient comments/concerns: having low blood sugars almost daily usually in the morning, feeling low right now.     Lab Results:  Lab Results   Component Value Date    A1C 8.8 03/29/2019      Lab Results   Component Value Date     08/05/2019       BG log:      Medication:  Diabetes Medication(s)     Dipeptidyl Peptidase-4 (DPP-4) Inhibitors       sitagliptin (JANUVIA) 50 MG tablet    Take 50 mg by mouth daily    Insulin       insulin glargine U-300 (TOUJEO SOLOSTAR) 300 UNIT/ML injection    Inject 28 Units Subcutaneous every morning           ASSESSMENT:    CGM study indicated for: Difficult to manage hypoglycemia and/or hyperglycemia, Suspected nocturnal hypoglycemia     Patient hypoglycemic in clinic- monitoring and treatment provided.  She did not have a carbohydrate source with her.  9:00am - BG 69mg/dl- provided can juice ( 20g carbohydrate) and animal crackers ( 15 g carbohydrate)  9:15am- BG 107mg/dl - no treatment  9:30am- BG 105mg/dl- provided animal crackers( 15g carbohydrate )  9:45am- BG  98mg/dl - -provided animal crackers( 15g carbohydrate)  10:00am- BG 121mg/dl- no treatment, patient feeling fine, no symptoms of hypoglycemia, walked patient to pharmacy to  glucose tablets.     Due to fasting hypoglycemia recommend decrease to Toujeo and change timing of the Januvia to morning. In additiona recommend she have a 15 gram carbohydrate snack at bedtime to prevent further hypo event.      INTERVENTION:   Sensor started today.     Sensor Type: LibrePro  Lot #: [id number]  Serial #: [id number]  Expiration Date: 12/31/19    Sensor was inserted with no resistance or bleeding at insertion site.    Pt will plan to wear the sensor through 9/4/19.    WRITTEN AND VERBAL INFORMATION GIVEN TO SUPPORT  UNDERSTANDING OF:  LibrePro CGM: Sensor insertion, intention of monitoring for 14 days. Keep records of BG, food intake, exercise, and medication dosing during wear.       Patient verbalizes understanding of how to remove sensor, if needed, and all instructions provided.     Education provided today on:  AADE Self-Care Behaviors:  Diabetes Pathophysiology  Monitoring: log and interpret results, individual blood glucose targets and frequency of monitoring  Taking Medication: action of prescribed medication, when to take medications and dosing  Problem Solving: low blood glucose - causes, signs/symptoms, treatment and prevention, carrying a carbohydrate source at all times and when to call health care provider    Opportunities for ongoing education and support in diabetes-self management were discussed.    Pt verbalized understanding of concepts discussed and recommendations provided today.         Educational and other materials:  Food/exercise/medication log sheets  Contact information  Hypoglycemia signs and symptoms   Plate planner     PLAN:  Pt was given instructions for tracking BG, medications, food intake and activity.  Patient to return all items associated with the professional Continuous Glucose Monitor System.  See Patient Instructions, AVS printed and provided to patient today.    Follow-up:    Follow up on 8/27/19.    Tara Cornejo RN,CDE   Time Spent: 60 minutes  Encounter Type: Individual

## 2019-08-22 NOTE — Clinical Note
MARTA Cochran had a hypoglycemia event in clinic today and her glucometer download did show she is having additional fasting hypoglycemia. I decreased her insulin today and I changed the timing of her januvia to morning. I placed a CGM on her and I will download it before she see's you next Tuesday.  I will send you a note before that appointment. Tara Cornejo RN,CDE

## 2019-08-22 NOTE — PATIENT INSTRUCTIONS
Decrease your Toujeo to 24 units daily.  Take Januvia in the morning     Make sure to have a 15 gram carbohydrate snack before bed.  Refer to the snack list for ideas.    Make sure to carry a fast acting carbohydrate with you at all times.   Stop by the pharmacy and get some glucose gel or glucose tablets.   You can also refer to the hypoglycemia handout for additional ideas.    Always test your blood sugar before you drive, make sure it's above 100.   Test blood sugars 4 times a day- before meals and before bedtime  ---------------------------------------------------------------------------------------------------------------------------------------    1. Plan to wear the LibrePro sensor for 14 days. It is okay to shower, bathe, and swim (up to 3 feet deep for 30 minutes)    2. Continue with your usual diabetes care plan - check blood sugars and take medicines, as prescribed.    3. Keep a log of what you eat and drink, when you take your medications and how much you take, and exercise you do while you are wearing the sensor.    3. Do not cover the sensor with extra adhesive (the small hole in the center of the sensor must remain uncovered)    4. Use a little extra care, especially when getting dressed or exercising, to avoid accidentally loosening or removing the sensor.     5. Remove the sensor if you need to have an MRI or CT scan.     [facility] for the [facility]:  For Your Diabetes Education and Nutrition Appointments Call:  [phone]   For Diabetes Education or Nutrition Related Questions:   Phone: [phone]  E-mail: [email or web address]  Fax: [phone]   If you need a medication refill please contact your pharmacy. Please allow 3 business days for your refills to be completed.    Instructions for emailing the Diabetes Educators    If you need to communicate a non-urgent message to a Diabetes Educator via email, please send to  [email or web address].    Please follow the following email guidelines:    Subject line: Secure: your clinic name (example: Secure: [facility])  In the email please include: First name, middle initial, last name and date of birth.    We will be in touch with you within one (1) business day.

## 2019-08-22 NOTE — LETTER
8/22/2019         RE: Chasity Hodgson  [address] [address] [facility] [address]        Dear Colleague,    Thank you for referring your patient, Chasity Hodgson, to the [facility]. Please see a copy of my visit note below.    Diabetes Self-Management Education & Support    Professional Continuous Glucose Monitor Insertion    SUBJECTIVE/OBJECTIVE:     Chasity Hodgson presents for professional Continuous Glucose Monitor Insertion.     Patient comments/concerns: having low blood sugars almost daily usually in the morning, feeling low right now.     Lab Results:  Lab Results   Component Value Date    A1C 8.8 03/29/2019      Lab Results   Component Value Date     08/05/2019       BG log:      Medication:  Diabetes Medication(s)     Dipeptidyl Peptidase-4 (DPP-4) Inhibitors       sitagliptin (JANUVIA) 50 MG tablet    Take 50 mg by mouth daily    Insulin       insulin glargine U-300 (TOUJEO SOLOSTAR) 300 UNIT/ML injection    Inject 28 Units Subcutaneous every morning           ASSESSMENT:    CGM study indicated for: Difficult to manage hypoglycemia and/or hyperglycemia, Suspected nocturnal hypoglycemia     Patient hypoglycemic in clinic- monitoring and treatment provided.  She did not have a carbohydrate source with her.  9:00am - BG 69mg/dl- provided can juice ( 20g carbohydrate) and animal crackers ( 15 g carbohydrate)  9:15am- BG 107mg/dl - no treatment  9:30am- BG 105mg/dl- provided animal crackers( 15g carbohydrate )  9:45am- BG  98mg/dl - -provided animal crackers( 15g carbohydrate)  10:00am- BG 121mg/dl- no treatment, patient feeling fine, no symptoms of hypoglycemia, walked patient to pharmacy to  glucose tablets.     Due to fasting hypoglycemia recommend decrease to Toujeo and change timing of the Januvia to morning. In additiona recommend she have a 15 gram carbohydrate snack at bedtime to prevent further hypo event.      INTERVENTION:   Sensor started today.      Sensor Type: LibrePro  Lot #: [id number]  Serial #: [id number]  Expiration Date: 12/31/19    Sensor was inserted with no resistance or bleeding at insertion site.    Pt will plan to wear the sensor through 9/4/19.    WRITTEN AND VERBAL INFORMATION GIVEN TO SUPPORT UNDERSTANDING OF:  LibrePro CGM: Sensor insertion, intention of monitoring for 14 days. Keep records of BG, food intake, exercise, and medication dosing during wear.       Patient verbalizes understanding of how to remove sensor, if needed, and all instructions provided.     Education provided today on:  AADE Self-Care Behaviors:  Diabetes Pathophysiology  Monitoring: log and interpret results, individual blood glucose targets and frequency of monitoring  Taking Medication: action of prescribed medication, when to take medications and dosing  Problem Solving: low blood glucose - causes, signs/symptoms, treatment and prevention, carrying a carbohydrate source at all times and when to call health care provider    Opportunities for ongoing education and support in diabetes-self management were discussed.    Pt verbalized understanding of concepts discussed and recommendations provided today.         Educational and other materials:  Food/exercise/medication log sheets  Contact information  Hypoglycemia signs and symptoms   Plate planner     PLAN:  Pt was given instructions for tracking BG, medications, food intake and activity.  Patient to return all items associated with the professional Continuous Glucose Monitor System.  See Patient Instructions, AVS printed and provided to patient today.    Follow-up:    Follow up on 8/27/19.    Tara Cornejo RN,CDE   Time Spent: 60 minutes  Encounter Type: Individual

## 2019-08-27 ENCOUNTER — ALLIED HEALTH/NURSE VISIT (OUTPATIENT)
Dept: EDUCATION SERVICES | Facility: CLINIC | Age: 74
End: 2019-08-27
Payer: MEDICARE

## 2019-08-27 ENCOUNTER — OFFICE VISIT (OUTPATIENT)
Dept: FAMILY MEDICINE | Facility: CLINIC | Age: 74
End: 2019-08-27
Payer: MEDICARE

## 2019-08-27 VITALS
SYSTOLIC BLOOD PRESSURE: 179 MMHG | BODY MASS INDEX: 40.05 KG/M2 | DIASTOLIC BLOOD PRESSURE: 68 MMHG | HEIGHT: 61 IN | OXYGEN SATURATION: 100 % | HEART RATE: 49 BPM | WEIGHT: 212.1 LBS | TEMPERATURE: 98.1 F

## 2019-08-27 DIAGNOSIS — E11.69 TYPE 2 DIABETES MELLITUS WITH OTHER SPECIFIED COMPLICATION, WITH LONG-TERM CURRENT USE OF INSULIN (H): Primary | ICD-10-CM

## 2019-08-27 DIAGNOSIS — E11.9 TYPE 2 DIABETES, HBA1C GOAL < 7% (H): ICD-10-CM

## 2019-08-27 DIAGNOSIS — Z79.4 TYPE 2 DIABETES MELLITUS WITH OTHER SPECIFIED COMPLICATION, WITH LONG-TERM CURRENT USE OF INSULIN (H): Primary | ICD-10-CM

## 2019-08-27 DIAGNOSIS — I10 BENIGN ESSENTIAL HYPERTENSION: ICD-10-CM

## 2019-08-27 PROCEDURE — 99214 OFFICE O/P EST MOD 30 MIN: CPT | Performed by: INTERNAL MEDICINE

## 2019-08-27 PROCEDURE — G0108 DIAB MANAGE TRN  PER INDIV: HCPCS

## 2019-08-27 RX ORDER — AMLODIPINE BESYLATE 2.5 MG/1
2.5 TABLET ORAL DAILY
Qty: 90 TABLET | Refills: 3 | Status: SHIPPED | OUTPATIENT
Start: 2019-08-27 | End: 2019-11-20

## 2019-08-27 ASSESSMENT — MIFFLIN-ST. JEOR: SCORE: 1409.46

## 2019-08-27 NOTE — PROGRESS NOTES
Diabetes Self-Management Education & Support      Diabetes Education Self-Management & Training: Follow-up and Continuous Glucose Monitor Download    Chasity Hodgson presents today for download and report review of Professional continuous glucose monitor device      Current Diabetes Management per Patient:  Diabetes Medication(s)     Dipeptidyl Peptidase-4 (DPP-4) Inhibitors       sitagliptin (JANUVIA) 50 MG tablet    Take 1 tablet (50 mg) by mouth daily In the morning    Insulin       insulin glargine U-300 (TOUJEO SOLOSTAR) 300 UNIT/ML injection    Inject 15 Units Subcutaneous every morning          Current Treatments: Insulin Injections, Oral Agent (monotherapy)    Most Recent A1c Result:    Lab Results   Component Value Date    A1C 8.8 03/29/2019       Continuous Glucose Monitor Interpretation     Reports:          Consistent day-to-day patterns: Pattern of nocturnal hypoglycemia, Pattern of daytime hypoglycemia    Healthy Eating  Cultural/Christian diet restrictions?: No  Meals include: Breakfast, Lunch, Dinner, Snacks      Being Active  Barrier to exercise: Time    Monitoring  Blood Glucose Meter: FreeStyle    Taking Medications  Diabetes Medication(s)     Dipeptidyl Peptidase-4 (DPP-4) Inhibitors       sitagliptin (JANUVIA) 50 MG tablet    Take 1 tablet (50 mg) by mouth daily In the morning    Insulin       insulin glargine U-300 (TOUJEO SOLOSTAR) 300 UNIT/ML injection    Inject 15 Units Subcutaneous every morning          Current Treatments: Insulin Injections, Oral Agent (monotherapy)    Problem Solving  Medical alert: No    Hypoglycemia symptoms  Confusion: No  Dizziness or Light-Headedness: Yes  Headaches: No  Mood changes: No  Nervousness/Anxiety: No  Sleepiness: Yes  Speech difficulty: No  Sweats: No  Tremors: Yes    Hypoglycemia Complications  Blackouts: No  Hospitalization: No  Nocturnal hypoglycemia: No  Required assistance: No  Required glucagon injection: No  Seizures: No    Reducing Risks  CAD  Risks: Diabetes Mellitus, Family history, Obesity, Post-menopausal, Sedentary lifestyle  Has dilated eye exam at least once a year?: Yes  Sees dentist every 6 months?: Yes  Sees podiatrist (foot doctor)?: Yes    Healthy Coping  Informal Support system:: Family  Difficulty affording diabetes management supplies?: No  Patient Activation Measure Survey Score:  No flowsheet data found.      Assessment:  Patient continues to experience hypoglycemia, she is consuming additional carbohydrate to treat or prevent hypoglycemia thorughout the day.  Overnight hypoglycemia with a bedtime snack at night.  Recommend further decrease to insulin from 24 to 15 units daily.  This is a 40% reduction in dose. Patient seeing PCP today, will forward note to Dr. Benoit for approval of dose change.  Additional education provided on healthy eating and portions.       INTERVENTION:   Decrease insulin from 24-0-0-0 -->  to 15-0-0-0.     Education provided today on:  AADE Self-Care Behaviors:  Healthy Eating: carbohydrate counting, consistency in amount, composition, and timing of food intake, portion control and plate planning method  Monitoring: log and interpret results, individual blood glucose targets and frequency of monitoring  Taking Medication: action of prescribed medication, side effects of prescribed medications, when to take medications and dosing  Problem Solving: high blood glucose - causes, signs/symptoms, treatment and prevention and low blood glucose - causes, signs/symptoms, treatment and prevention  Pt verbalized understanding of concepts discussed and recommendations provided today.       Education Materials Provided:  Carbohydrate Counting    PLAN:  See Patient Instructions for co-developed, patient-stated behavior change goals.  AVS printed and provided to patient today. See Follow-Up section for recommended follow-up.    Tara Cornejo RN,CDE   Time Spent: 50 minutes  Encounter Type: Individual    Any diabetes  medication dose changes were made via the CDE Protocol and Collaborative Practice Agreement with the patient's referring provider. A copy of this encounter was shared with the provider.

## 2019-08-27 NOTE — PATIENT INSTRUCTIONS
(E11.69,  Z79.4) Type 2 diabetes mellitus with other specified complication, with long-term current use of insulin (H)  (primary encounter diagnosis)  Comment: recommend continue with insulin adjustment through diabetic education and continue with continuous glucose monitoring  Plan:     (I10) Benign essential hypertension  Comment: blood pressure is elevated today and we will add a amlodipine 2.5 mg (low dose) and monitor closely for swelling in the ankles.  Recheck blood pressure next week in diabetic educator   Plan: amLODIPine (NORVASC) 2.5 MG tablet

## 2019-08-27 NOTE — Clinical Note
,Please see my notes, sruthi is still having some hypoglycemia and feeding her insulin with additional carbohydrate. I recommended today she decrease to 15 units daily. We talked about additional healthy eating.  I will talk with her on Thursday for additional adjustment and she will wear the CGM for 1 more week.Please let me know if you agree with my plan.Tara Cornejo RN,CDE

## 2019-08-27 NOTE — PATIENT INSTRUCTIONS
Decrease your insulin to 15 units daily.   - watch your sugars today since you already took 24 units.  - with the decrease to the insulin dose you will not likely have the low blood sugar. Limit your extra snacks and juice only if you need it for blood sugars below 80)    Continue with the Januvia in the morning    Make sure to have your snack at bedtime.  Refer to the snack list    Limit your meat portion ( ex:1 sausage bhavik Or 2 wall - Not both)     Try a piece of fruit instead of juice     Try adding a vegetable every day- refer to the vegetable list  You can have as much of these vegetables as you want.     Follow up on Wednesday September 4th  at 7:30am

## 2019-08-27 NOTE — LETTER
8/27/2019         RE: Chasity Hodgson  [address] [facility] [address]        Dear Colleague,    Thank you for referring your patient, Chasity Hodgson, to the [facility]. Please see a copy of my visit note below.    Diabetes Self-Management Education & Support      Diabetes Education Self-Management & Training: Follow-up and Continuous Glucose Monitor Download    Chasity Hodgson presents today for download and report review of Professional continuous glucose monitor device      Current Diabetes Management per Patient:  Diabetes Medication(s)     Dipeptidyl Peptidase-4 (DPP-4) Inhibitors       sitagliptin (JANUVIA) 50 MG tablet    Take 1 tablet (50 mg) by mouth daily In the morning    Insulin       insulin glargine U-300 (TOUJEO SOLOSTAR) 300 UNIT/ML injection    Inject 15 Units Subcutaneous every morning          Current Treatments: Insulin Injections, Oral Agent (monotherapy)    Most Recent A1c Result:    Lab Results   Component Value Date    A1C 8.8 03/29/2019       Continuous Glucose Monitor Interpretation     Reports:          Consistent day-to-day patterns: Pattern of nocturnal hypoglycemia, Pattern of daytime hypoglycemia    Healthy Eating  Cultural/[employer] diet restrictions?: No  Meals include: Breakfast, Lunch, Dinner, Snacks    Being Active  Barrier to exercise: Time    Monitoring  Blood Glucose Meter: FreeStyle    Taking Medications  Diabetes Medication(s)     Dipeptidyl Peptidase-4 (DPP-4) Inhibitors       sitagliptin (JANUVIA) 50 MG tablet    Take 1 tablet (50 mg) by mouth daily In the morning    Insulin       insulin glargine U-300 (TOUJEO SOLOSTAR) 300 UNIT/ML injection    Inject 15 Units Subcutaneous every morning          Current Treatments: Insulin Injections, Oral Agent (monotherapy)    Problem Solving  Medical alert: No    Hypoglycemia symptoms  Confusion: No  Dizziness or Light-Headedness: Yes  Headaches: No  Mood changes: No  Nervousness/Anxiety:  No  Sleepiness: Yes  Speech difficulty: No  Sweats: No  Tremors: Yes    Hypoglycemia Complications  Blackouts: No  Hospitalization: No  Nocturnal hypoglycemia: No  Required assistance: No  Required glucagon injection: No  Seizures: No    Reducing Risks  CAD Risks: Diabetes Mellitus, Family history, Obesity, Post-menopausal, Sedentary lifestyle  Has dilated eye exam at least once a year?: Yes  Sees dentist every 6 months?: Yes  Sees podiatrist (foot doctor)?: Yes    Healthy Coping  Informal Support system:: Family  Difficulty affording diabetes management supplies?: No  Patient Activation Measure Survey Score:  No flowsheet data found.      Assessment:  Patient continues to experience hypoglycemia, she is consuming additional carbohydrate to treat or prevent hypoglycemia thorughout the day.  Overnight hypoglycemia with a bedtime snack at night.  Recommend further decrease to insulin from 24 to 15 units daily.  This is a 40% reduction in dose. Patient seeing PCP today, will forward note to Dr. Benoit for approval of dose change.  Additional education provided on healthy eating.       INTERVENTION:   Decrease insulin from 24-0-0-0 -->  to 15-0-0-0.     Education provided today on:  AADE Self-Care Behaviors:  Healthy Eating: carbohydrate counting, consistency in amount, composition, and timing of food intake, portion control and plate planning method  Monitoring: log and interpret results, individual blood glucose targets and frequency of monitoring  Taking Medication: action of prescribed medication, side effects of prescribed medications, when to take medications and dosing  Problem Solving: high blood glucose - causes, signs/symptoms, treatment and prevention and low blood glucose - causes, signs/symptoms, treatment and prevention  Pt verbalized understanding of concepts discussed and recommendations provided today.       Education Materials Provided:  Carbohydrate Counting    PLAN:  See Patient Instructions for  co-developed, patient-stated behavior change goals.  AVS printed and provided to patient today. See Follow-Up section for recommended follow-up.    Tara Cornejo RN,CDE   Time Spent: 50 minutes  Encounter Type: Individual    Any diabetes medication dose changes were made via the CDE Protocol and Collaborative Practice Agreement with the patient's referring provider. A copy of this encounter was shared with the provider.

## 2019-08-27 NOTE — PROGRESS NOTES
"[facility]  CLINIC PROGRESS NOTE    Subjective:  Diabetes mellitus   Chasity Hodgson returns for 2-week follow-up of her recent office visit.  At her last office visit we discussed an episode of hypoglycemia which brought her to the emergency room.  We discussed her insulin at that time and it was noted that she had recently reduced her dose of Toujeo and endocrinology.  Since then she has been seen in diabetic education and has been initiated on a continuous glucose monitoring system.    She did discuss with diabetic educator today and dose of Trujeo was reduced to 15 units today.  She notes wasn't eating scheduled snacks, but now she is eating more scheduled diet.    Hypertension   Her blood pressure is a little bit elevated at her last office visit as well and no changes were made with recommendations to follow-up today which will be 2 weeks for recheck she continues on lisinopril, Lasix, and atenolol.  She had taken amlodipine in the past, but had to stop this last February due to swelling in her ankles.        ROS  CONSTITUTIONAL: no fatigue, no unexpected change in weight  SKIN: no worrisome rashes, no worrisome moles,   EYES: no acute vision problems or changes  ENT: no ear problems, no mouth problems, no throat problems  RESP: no significant cough, no shortness of breath  CV: no chest pain, no palpitations, stable peripheral edema  GI: no nausea, no vomiting, no constipation, no diarrhea  : no frequency, no dysuria, no hematuria  MS: chronic ankle pain - using brace  PSYCHIATRIC: no changes in mood or affect - ongoing stress      Objective:  Vitals  BP (!) 179/68 (BP Location: Right arm, Patient Position: Sitting, Cuff Size: Adult Large)   Pulse (!) 49   Temp 98.1  F (36.7  C) (Oral)   Ht 1.549 m (5' 1\")   Wt 96.2 kg (212 lb 1.6 oz)   SpO2 100%   Breastfeeding? No   BMI 40.08 kg/m    GEN: Alert Oriented x3 NAD  HEENT: Atraumatic, normocephalic, neck supple   CV: RRR no murmurs or " rubs  PULM: CTA no wheezes or crackles  ABD: Soft, nontender nondistended  SKIN: No visible skin lesion or ulcerations  EXT: Brace on right ankle, no edema bilateral lower extremities  NEURO: Gait and station normal, No focal neurologic deficits  PSYCH: Mood good, affect mood congruent    No images are attached to the encounter.    No results found for this or any previous visit (from the past 24 hour(s)).    Assessment/Plan:  Patient Instructions   (E11.69,  Z79.4) Type 2 diabetes mellitus with other specified complication, with long-term current use of insulin (H)  (primary encounter diagnosis)  Comment: recommend continue with insulin adjustment through diabetic education and continue with continuous glucose monitoring  Plan:     (I10) Benign essential hypertension  Comment: blood pressure is elevated today and we will add a amlodipine 2.5 mg (low dose) and monitor closely for swelling in the ankles.  Recheck blood pressure next week in diabetic educator   Plan: amLODIPine (NORVASC) 2.5 MG tablet               Follow up in 3 months    Disclaimer: This note consists of symbols derived from keyboarding, dictation and/or voice recognition software. As a result, there may be errors in the script that have gone undetected. Please consider this when interpreting information found in this chart.    Shola Benoit MD  [phone]

## 2019-08-29 ENCOUNTER — PATIENT OUTREACH (OUTPATIENT)
Dept: EDUCATION SERVICES | Facility: CLINIC | Age: 74
End: 2019-08-29
Payer: MEDICARE

## 2019-08-29 DIAGNOSIS — E11.9 TYPE 2 DIABETES, HBA1C GOAL < 7% (H): ICD-10-CM

## 2019-08-29 NOTE — PROGRESS NOTES
Diabetes Follow-up    Subjective/Objective:    Chasity Hodgson stopped in for glucose review due to recent episodes of hypoglycemia.  She states she is taking the 15 units of Toujeo and feeling better.  Still snacking but not as much and continues to have a bedtime snack    Diabetes is being managed with   Diabetes Medications   Diabetes Medication(s)     Dipeptidyl Peptidase-4 (DPP-4) Inhibitors       sitagliptin (JANUVIA) 50 MG tablet    Take 1 tablet (50 mg) by mouth daily In the morning    Insulin       insulin glargine U-300 (TOUJEO SOLOSTAR) 300 UNIT/ML injection    Inject 15 Units Subcutaneous every morning          CGM download:                   Assessment:  Hypoglycemia improved with decrease in insulin dose.  She did have a couple hypo events since insulin dose and she is still snacking to prevent hypoglycemia. Recommend further decrease to insulin and decrease the snacking, she can continue a small snack at bedtime.    Patient states understanding and agrees with plan.  Tara Cornejo RN,CDE     Time spent: 20 minutes- no charge visist    Any diabetes medication dose changes were made via the CDE Protocol and Collaborative Practice Agreement with the patient's referring provider. A copy of this encounter was shared with the provider.

## 2019-09-04 ENCOUNTER — TRANSFERRED RECORDS (OUTPATIENT)
Dept: HEALTH INFORMATION MANAGEMENT | Facility: CLINIC | Age: 74
End: 2019-09-04

## 2019-09-04 ENCOUNTER — ALLIED HEALTH/NURSE VISIT (OUTPATIENT)
Dept: NURSING | Facility: CLINIC | Age: 74
End: 2019-09-04
Payer: MEDICARE

## 2019-09-04 ENCOUNTER — MYC MEDICAL ADVICE (OUTPATIENT)
Dept: EDUCATION SERVICES | Facility: CLINIC | Age: 74
End: 2019-09-04

## 2019-09-04 ENCOUNTER — ALLIED HEALTH/NURSE VISIT (OUTPATIENT)
Dept: EDUCATION SERVICES | Facility: CLINIC | Age: 74
End: 2019-09-04
Payer: MEDICARE

## 2019-09-04 VITALS — DIASTOLIC BLOOD PRESSURE: 62 MMHG | SYSTOLIC BLOOD PRESSURE: 124 MMHG

## 2019-09-04 VITALS — BODY MASS INDEX: 39.68 KG/M2 | WEIGHT: 210 LBS

## 2019-09-04 DIAGNOSIS — E11.9 TYPE 2 DIABETES, HBA1C GOAL < 7% (H): ICD-10-CM

## 2019-09-04 DIAGNOSIS — Z01.30 BLOOD PRESSURE CHECK: Primary | ICD-10-CM

## 2019-09-04 PROCEDURE — G0108 DIAB MANAGE TRN  PER INDIV: HCPCS

## 2019-09-04 PROCEDURE — 99207 ZZC NO CHARGE NURSE ONLY: CPT

## 2019-09-04 NOTE — LETTER
9/4/2019         RE: Chasity Hodgson  [address] [facility] [address]        Dear Colleague,    Thank you for referring your patient, Chasity Hodgson, to the [facility]. Please see a copy of my visit note below.    Diabetes Self-Management Education & Support      Diabetes Education Self-Management & Training: Follow-up and Continuous Glucose Monitor Download    Chasity Hodgson presents today for download and report review of Professional continuous glucose monitor device      Current Diabetes Management per Patient:  Diabetes Medication(s)     Dipeptidyl Peptidase-4 (DPP-4) Inhibitors       sitagliptin (JANUVIA) 50 MG tablet    Take 1 tablet (50 mg) by mouth daily In the morning    Insulin       insulin glargine U-300 (TOUJEO SOLOSTAR) 300 UNIT/ML injection    Inject 10 Units Subcutaneous every morning          Taking Medication Assessed Today: Yes  Current Treatments: Insulin Injections, Oral Agent (monotherapy)  Dose schedule: pre-breakfast  Given by: Patient  Injection/Infusion sites: Abdomen  Problems taking diabetes medications regularly?: No  Diabetes medication side effects?: No  Treatment Compliance: All of the time    Most Recent A1c Result:    Lab Results   Component Value Date    A1C 8.8 03/29/2019       Continuous Glucose Monitor Interpretation     Reports:            Consistent day-to-day patterns: Pattern of post meal hyperglycemia    Healthy Eating  Healthy Eating Assessed Today: Yes  Cultural/[employer] diet restrictions?: No  Patient on a regular basis: Eats 3 meals a day  Meals include: Breakfast, Lunch, Dinner, Snacks  Has patient met with a dietitian in the past?: No      Being Active  Being Active Assessed Today: Yes  Exercise:: Currently not exercising(is trying to move more- has been doing some shopping)  Barrier to exercise: Time(taking care of  and his appts)    Monitoring  Monitoring Assessed Today: Yes  Did patient bring glucose meter to  appointment? : Yes  Blood Glucose Meter: FreeStyle    Taking Medications  Diabetes Medication(s)     Dipeptidyl Peptidase-4 (DPP-4) Inhibitors       sitagliptin (JANUVIA) 50 MG tablet    Take 1 tablet (50 mg) by mouth daily In the morning    Insulin       insulin glargine U-300 (TOUJEO SOLOSTAR) 300 UNIT/ML injection    Inject 10 Units Subcutaneous every morning          Taking Medication Assessed Today: Yes  Current Treatments: Insulin Injections, Oral Agent (monotherapy)  Dose schedule: pre-breakfast  Given by: Patient  Injection/Infusion sites: Abdomen  Problems taking diabetes medications regularly?: No  Diabetes medication side effects?: No  Treatment Compliance: All of the time    Problem Solving  Problem Solving Assessed Today: Yes  Hypoglycemia Frequency: Other(no symptoms since last insulin dose change)  Patient carries a carbohydrate source: Yes  Medical alert: No    Hypoglycemia symptoms  Confusion: No  Dizziness or Light-Headedness: Yes  Headaches: No  Mood changes: No  Nervousness/Anxiety: No  Sleepiness: Yes  Speech difficulty: No  Sweats: No  Tremors: Yes    Hypoglycemia Complications  Blackouts: No  Hospitalization: No  Nocturnal hypoglycemia: No  Required assistance: No  Required glucagon injection: No  Seizures: No    Reducing Risks  Reducing Risks Assessed Today: Yes  Diabetes Risks: Age over 45 years, Family History, Sedentary Lifestyle  CAD Risks: Diabetes Mellitus, Family history, Obesity, Post-menopausal, Sedentary lifestyle  Has dilated eye exam at least once a year?: Yes  Sees dentist every 6 months?: Yes  Sees podiatrist (foot doctor)?: Yes    Healthy Coping  Healthy Coping Assessed Today: Yes  Emotional response to diabetes: Ready to learn, Acceptance, Confidence diabetes can be controlled  Informal Support system:: Family  Stage of change: ACTION (Actively working towards change)  Difficulty affording diabetes management supplies?: No  Patient Activation Measure Survey Score:  No  flowsheet data found.      Assessment:  Hypoglycemia improved with decrease to insulin, she did have 2 additional hypo events with 12 units of Toujeo, recommend decrease to 10 units to prevent further hypoglycemia.    Post meal hyperglycemia due to higher carbohydrate meal, and cereal with fruit after breakfast.  Patient is able to identify the higher readings related to food       INTERVENTION:   Education provided today on:  AADE Self-Care Behaviors:  Healthy Eating: consistency in amount, composition, and timing of food intake, weight reduction, portion control and plate planning method  Being Active: relationship to blood glucose  Monitoring: log and interpret results, individual blood glucose targets and frequency of monitoring  Taking Medication: action of prescribed medication, when to take medications and dosing  Reducing Risks: major complications of diabetes, prevention, early diagnostic measures and treatment of complications, foot care, appropriate dental care, annual eye exam and A1C - goals, relating to blood glucose levels, how often to check  Healthy Coping: benefits of making appropriate lifestyle changes and utilize support systems    Pt verbalized understanding of concepts discussed and recommendations provided today.       Education Materials Provided:  My Plate Planner and 2-3 carbohydrate choice meal ideas and snack ideas    PLAN:  See Patient Instructions for co-developed, patient-stated behavior change goals.  AVS printed and provided to patient today. See Follow-Up section for recommended follow-up.    Tara Cornejo RN,CDE   Time Spent: 50 minutes  Encounter Type: Individual    Any diabetes medication dose changes were made via the CDE Protocol and Collaborative Practice Agreement with the patient's referring provider. A copy of this encounter was shared with the provider.

## 2019-09-04 NOTE — PROGRESS NOTES
Diabetes Self-Management Education & Support      Diabetes Education Self-Management & Training: Follow-up and Continuous Glucose Monitor Download    Chasity Hodgson presents today for download and report review of Professional continuous glucose monitor device      Current Diabetes Management per Patient:  Diabetes Medication(s)     Dipeptidyl Peptidase-4 (DPP-4) Inhibitors       sitagliptin (JANUVIA) 50 MG tablet    Take 1 tablet (50 mg) by mouth daily In the morning    Insulin       insulin glargine U-300 (TOUJEO SOLOSTAR) 300 UNIT/ML injection    Inject 10 Units Subcutaneous every morning          Taking Medication Assessed Today: Yes  Current Treatments: Insulin Injections, Oral Agent (monotherapy)  Dose schedule: pre-breakfast  Given by: Patient  Injection/Infusion sites: Abdomen  Problems taking diabetes medications regularly?: No  Diabetes medication side effects?: No  Treatment Compliance: All of the time    Most Recent A1c Result:    Lab Results   Component Value Date    A1C 8.8 03/29/2019       Continuous Glucose Monitor Interpretation     Reports:            Consistent day-to-day patterns: Pattern of post meal hyperglycemia    Healthy Eating  Healthy Eating Assessed Today: Yes  Cultural/Hoahaoism diet restrictions?: No  Patient on a regular basis: Eats 3 meals a day  Meals include: Breakfast, Lunch, Dinner, Snacks  Has patient met with a dietitian in the past?: No      Being Active  Being Active Assessed Today: Yes  Exercise:: Currently not exercising(is trying to move more- has been doing some shopping)  Barrier to exercise: Time(taking care of  and his appts)    Monitoring  Monitoring Assessed Today: Yes  Did patient bring glucose meter to appointment? : Yes  Blood Glucose Meter: FreeStyle    Taking Medications  Diabetes Medication(s)     Dipeptidyl Peptidase-4 (DPP-4) Inhibitors       sitagliptin (JANUVIA) 50 MG tablet    Take 1 tablet (50 mg) by mouth daily In the morning    Insulin        insulin glargine U-300 (TOUJEO SOLOSTAR) 300 UNIT/ML injection    Inject 10 Units Subcutaneous every morning          Taking Medication Assessed Today: Yes  Current Treatments: Insulin Injections, Oral Agent (monotherapy)  Dose schedule: pre-breakfast  Given by: Patient  Injection/Infusion sites: Abdomen  Problems taking diabetes medications regularly?: No  Diabetes medication side effects?: No  Treatment Compliance: All of the time    Problem Solving  Problem Solving Assessed Today: Yes  Hypoglycemia Frequency: Other(no symptoms since last insulin dose change)  Patient carries a carbohydrate source: Yes  Medical alert: No    Hypoglycemia symptoms  Confusion: No  Dizziness or Light-Headedness: Yes  Headaches: No  Mood changes: No  Nervousness/Anxiety: No  Sleepiness: Yes  Speech difficulty: No  Sweats: No  Tremors: Yes    Hypoglycemia Complications  Blackouts: No  Hospitalization: No  Nocturnal hypoglycemia: No  Required assistance: No  Required glucagon injection: No  Seizures: No    Reducing Risks  Reducing Risks Assessed Today: Yes  Diabetes Risks: Age over 45 years, Family History, Sedentary Lifestyle  CAD Risks: Diabetes Mellitus, Family history, Obesity, Post-menopausal, Sedentary lifestyle  Has dilated eye exam at least once a year?: Yes  Sees dentist every 6 months?: Yes  Sees podiatrist (foot doctor)?: Yes    Healthy Coping  Healthy Coping Assessed Today: Yes  Emotional response to diabetes: Ready to learn, Acceptance, Confidence diabetes can be controlled  Informal Support system:: Family  Stage of change: ACTION (Actively working towards change)  Difficulty affording diabetes management supplies?: No  Patient Activation Measure Survey Score:  No flowsheet data found.      Assessment:  Hypoglycemia improved with decrease to insulin, she did have 2 additional hypo events with 12 units of Toujeo, recommend decrease to 10 units to prevent further hypoglycemia.    Post meal hyperglycemia due to higher  carbohydrate meal, and cereal with fruit after breakfast.  Patient is able to identify the higher readings related to food       INTERVENTION:   Education provided today on:  AADE Self-Care Behaviors:  Healthy Eating: consistency in amount, composition, and timing of food intake, weight reduction, portion control and plate planning method  Being Active: relationship to blood glucose  Monitoring: log and interpret results, individual blood glucose targets and frequency of monitoring  Taking Medication: action of prescribed medication, when to take medications and dosing  Reducing Risks: major complications of diabetes, prevention, early diagnostic measures and treatment of complications, foot care, appropriate dental care, annual eye exam and A1C - goals, relating to blood glucose levels, how often to check  Healthy Coping: benefits of making appropriate lifestyle changes and utilize support systems    Pt verbalized understanding of concepts discussed and recommendations provided today.       Education Materials Provided:  My Plate Planner and 2-3 carbohydrate choice meal ideas and snack ideas    PLAN:  See Patient Instructions for co-developed, patient-stated behavior change goals.  AVS printed and provided to patient today. See Follow-Up section for recommended follow-up.    Tara Cornejo RN,CDE   Time Spent: 50 minutes  Encounter Type: Individual    Any diabetes medication dose changes were made via the CDE Protocol and Collaborative Practice Agreement with the patient's referring provider. A copy of this encounter was shared with the provider.

## 2019-09-04 NOTE — PATIENT INSTRUCTIONS
Care Plan:  Meal Plan Recommendation: eat 3 meals a day,  1-3 small snacks between meals, use portion control, use plate planning method   Try having your fruit for a snack instead of at the meal.      Exercise / activity plan: walk 5 minutes in the morning and 5 minutes in the afternoon  Check blood sugars before meals and bedtime  Recommend decrease to insulin - 10 units in the morning  Continue with Januvia in the morning    Follow up:  Follow-up diabetes education appointment scheduled on October 2nd at 10:30am.      Bring blood glucose meter and logbook with you to all doctor and follow-up appointments.     [facility] [facility] for the [facility]:  For Your Diabetes or Nutrition Education Appointments Call:  [phone]   For Diabetes or Nutrition Related Questions Call or Email:   [phone]  [email or web address]  Fax: [phone]   If you need a medication refill please contact your pharmacy. Please allow 3 business days for your refills to be completed.     Instructions for emailing the Diabetes Educators    If you need to communicate a non-urgent message to a Diabetes Educator via email, please send to [email or web address].    Please follow the following email guidelines:    Subject line: Secure: your clinic name (example: Secure: [facility])  In the email please include: First name, middle initial, last name and date of birth.    We will be in touch with you within one (1) business day.

## 2019-09-10 ENCOUNTER — OFFICE VISIT (OUTPATIENT)
Dept: FAMILY MEDICINE | Facility: CLINIC | Age: 74
End: 2019-09-10
Payer: MEDICARE

## 2019-09-10 ENCOUNTER — MYC MEDICAL ADVICE (OUTPATIENT)
Dept: EDUCATION SERVICES | Facility: CLINIC | Age: 74
End: 2019-09-10

## 2019-09-10 VITALS
SYSTOLIC BLOOD PRESSURE: 122 MMHG | DIASTOLIC BLOOD PRESSURE: 58 MMHG | WEIGHT: 211.2 LBS | HEIGHT: 61 IN | TEMPERATURE: 98.2 F | BODY MASS INDEX: 39.88 KG/M2 | OXYGEN SATURATION: 98 % | HEART RATE: 53 BPM

## 2019-09-10 DIAGNOSIS — E11.9 TYPE 2 DIABETES, HBA1C GOAL < 7% (H): Primary | ICD-10-CM

## 2019-09-10 DIAGNOSIS — Z23 NEED FOR PROPHYLACTIC VACCINATION AND INOCULATION AGAINST INFLUENZA: ICD-10-CM

## 2019-09-10 LAB — HBA1C MFR BLD: 6.8 % (ref 0–5.6)

## 2019-09-10 PROCEDURE — 90662 IIV NO PRSV INCREASED AG IM: CPT | Performed by: INTERNAL MEDICINE

## 2019-09-10 PROCEDURE — 83036 HEMOGLOBIN GLYCOSYLATED A1C: CPT | Performed by: INTERNAL MEDICINE

## 2019-09-10 PROCEDURE — 80048 BASIC METABOLIC PNL TOTAL CA: CPT | Performed by: INTERNAL MEDICINE

## 2019-09-10 PROCEDURE — 99213 OFFICE O/P EST LOW 20 MIN: CPT | Mod: 25 | Performed by: INTERNAL MEDICINE

## 2019-09-10 PROCEDURE — G0008 ADMIN INFLUENZA VIRUS VAC: HCPCS | Performed by: INTERNAL MEDICINE

## 2019-09-10 PROCEDURE — 36415 COLL VENOUS BLD VENIPUNCTURE: CPT | Performed by: INTERNAL MEDICINE

## 2019-09-10 ASSESSMENT — MIFFLIN-ST. JEOR: SCORE: 1405.38

## 2019-09-10 NOTE — RESULT ENCOUNTER NOTE
Gerard Gaspar,    I have had the opportunity to review your recent results and an interpretation is as follows:  Congratulaions on your excellent results!  Your hemoglobin A1c is now down to 6.8     Sincerely,  Shola Benoit MD

## 2019-09-10 NOTE — PATIENT INSTRUCTIONS
(E11.9) Type 2 diabetes, HbA1c goal < 7% (H)  (primary encounter diagnosis)  Comment: we will try to order a continuous glucose monitoring to use at home and follow up in diabetic education  Plan: Continuous Glucose Monitor KIT            Hypertension  Comment: blood pressure is better controlled today - we will continue with current medications.

## 2019-09-11 ENCOUNTER — ALLIED HEALTH/NURSE VISIT (OUTPATIENT)
Dept: EDUCATION SERVICES | Facility: CLINIC | Age: 74
End: 2019-09-11
Payer: MEDICARE

## 2019-09-11 DIAGNOSIS — E11.9 TYPE 2 DIABETES, HBA1C GOAL < 7% (H): ICD-10-CM

## 2019-09-11 LAB
ANION GAP SERPL CALCULATED.3IONS-SCNC: 7 MMOL/L (ref 3–14)
BUN SERPL-MCNC: 47 MG/DL (ref 7–30)
CALCIUM SERPL-MCNC: 9.3 MG/DL (ref 8.5–10.1)
CHLORIDE SERPL-SCNC: 106 MMOL/L (ref 94–109)
CO2 SERPL-SCNC: 27 MMOL/L (ref 20–32)
CREAT SERPL-MCNC: 1.49 MG/DL (ref 0.52–1.04)
GFR SERPL CREATININE-BSD FRML MDRD: 35 ML/MIN/{1.73_M2}
GLUCOSE SERPL-MCNC: 111 MG/DL (ref 70–99)
POTASSIUM SERPL-SCNC: 4.9 MMOL/L (ref 3.4–5.3)
SODIUM SERPL-SCNC: 138 MMOL/L (ref 133–144)

## 2019-09-11 NOTE — PATIENT INSTRUCTIONS
Increase toujeo to 12 units daily    1. Plan to wear the LibrePro sensor for 14 days. It is okay to shower, bathe, and swim (up to 3 feet deep for 30 minutes)    2. Continue with your usual diabetes care plan - check blood sugars and take medicines, as prescribed.    3. Keep a log of what you eat and drink, when you take your medications and how much you take, and exercise you do while you are wearing the sensor.    3. Do not cover the sensor with extra adhesive (the small hole in the center of the sensor must remain uncovered)    4. Use a little extra care, especially when getting dressed or exercising, to avoid accidentally loosening or removing the sensor.     5. Remove the sensor if you need to have an MRI or CT scan.     Upload your daryn sensor on Sunday/monday and send [employer] message that you uploaded and any information/insight on how you are doing.       [place] [facility] for the [facility]:  For Your Diabetes Education and Nutrition Appointments Call:  [phone]   For Diabetes Education or Nutrition Related Questions:   Phone: [phone]  E-mail: [email or web address]  Fax: [phone]   If you need a medication refill please contact your pharmacy. Please allow 3 business days for your refills to be completed.    Instructions for emailing the Diabetes Educators    If you need to communicate a non-urgent message to a Diabetes Educator via email, please send to [email or web address].    Please follow the following email guidelines:    Subject line: Secure: your clinic name (example: Secure: [facility])  In the email please include: First name, middle initial, last name and date of birth.    We will be in touch with you within one (1) business day.

## 2019-09-11 NOTE — LETTER
9/11/2019         RE: Chasity Hodgson  [address] [address]        Dear Colleague,    Thank you for referring your patient, Chasity Hodgson, to the [facility]. Please see a copy of my visit note below.    Diabetes Self-Management Education & Support      Diabetes Self-Management Education & Support - Personal CGM Start    SUBJECTIVE/OBJECTIVE  Presents for: Follow-up  Accompanied by: Self  Diabetes education in the past 24mo: Yes  Focus of Visit: CGM  Diabetes type: Type 2  Disease course: Stable  Diabetes management related comments/concerns: would like help starting vini sensor, blood sugars have been a little high but under more stress lately too.  Transportation concerns: No  Other concerns:: None  Cultural Influences/Ethnic Background:  American      Patient seen today for Personal CGM Start:    Patient completed homework (online training and/or Getting started with CGM guide)? : Yes  Sensor started:: Started today in clinic  CGM Initial Settings: Freestyle Vini  Freestyle Vini Target Glucose Range (mg/dL):   CGM Start Plan: Change sensor, as directed.    Healthy Eating  Healthy Eating Assessed Today: No    Being Active  Being Active Assessed Today: No    Monitoring  Monitoring Assessed Today: Yes  Did patient bring glucose meter to appointment? : Yes  Blood Glucose Meter: FreeStyle    Taking Medications  Diabetes Medication(s)     Dipeptidyl Peptidase-4 (DPP-4) Inhibitors       sitagliptin (JANUVIA) 50 MG tablet    Take 1 tablet (50 mg) by mouth daily In the morning    Insulin       insulin glargine U-300 (TOUJEO SOLOSTAR) 300 UNIT/ML injection    Inject 10 Units Subcutaneous every morning          Taking Medication Assessed Today: Yes  Current Treatments: Insulin Injections, Oral Agent (monotherapy)  Dose schedule: pre-breakfast  Given by: Patient  Injection/Infusion sites: Abdomen  Problems taking diabetes medications regularly?: No  Diabetes  medication side effects?: No  Treatment Compliance: All of the time    Problem Solving  Problem Solving Assessed Today: Yes  Hypoglycemia Frequency: Other(no recent symptoms since last appt )  Patient carries a carbohydrate source: Yes  Medical alert: No    Reducing Risks  Reducing Risks Assessed Today: No    Healthy Coping  Healthy Coping Assessed Today: No    ASSESSMENT  Patient glucose has been a little higher, she stats she feels likely due to increase in stress.  Recommend she increase insulin back to 12 units daily, monitor closely with new Vini sensor.  Watch portions at meals and snacks.    Education provided today on:  AADE Self-Care Behaviors:  Monitoring: individual blood glucose targets and frequency of monitoring  Taking Medication: dosing    CGM-specific education:   Freestyle Vini sensor: insertion technique, sensor site location and rotation, insulin administration in relation to sensor placement, sensor wear, reasons to remove sensor (MRI, CT, diathermy), Vitamin C & Aspirin effects on sensor, Vini [place]: frequency of scanning sensor, length of time data is visible, use of built in glucose meter, Precision X-tra test strips, Use of trends and graphs for pattern management and problem solving, Dosing insulin based on sensor glucose results, LibreView software and email link sent to patient to download at homeOpportunities for ongoing education and support in diabetes-self management were discussed.    Pt verbalized understanding of concepts discussed and recommendations provided today.     PLAN  See Patient Instructions for co-developed, patient-stated behavior change goals.  AVS printed and provided to patient today. See Follow-Up section for recommended follow-up.    Tara Cornejo RN,CDE   Time Spent: 20 minutes  Encounter Type: Individual    Any diabetes medication dose changes were made via the CDE Protocol and Collaborative Practice Agreement with the patient's referring provider. A copy of  this encounter was shared with the provider.

## 2019-09-11 NOTE — PROGRESS NOTES
Diabetes Self-Management Education & Support      Diabetes Self-Management Education & Support - Personal CGM Start    SUBJECTIVE/OBJECTIVE  Presents for: Follow-up  Accompanied by: Self  Diabetes education in the past 24mo: Yes  Focus of Visit: CGM  Diabetes type: Type 2  Disease course: Stable  Diabetes management related comments/concerns: would like help starting vini sensor, blood sugars have been a little high but under more stress lately too.  Transportation concerns: No  Other concerns:: None  Cultural Influences/Ethnic Background:  American      Patient seen today for Personal CGM Start:    Patient completed homework (online training and/or Getting started with CGM guide)? : Yes  Sensor started:: Started today in clinic  CGM Initial Settings: Freestyle Vini  Freestyle Vini Target Glucose Range (mg/dL):   CGM Start Plan: Change sensor, as directed.    Healthy Eating  Healthy Eating Assessed Today: No    Being Active  Being Active Assessed Today: No    Monitoring  Monitoring Assessed Today: Yes  Did patient bring glucose meter to appointment? : Yes  Blood Glucose Meter: FreeStyle    Taking Medications  Diabetes Medication(s)     Dipeptidyl Peptidase-4 (DPP-4) Inhibitors       sitagliptin (JANUVIA) 50 MG tablet    Take 1 tablet (50 mg) by mouth daily In the morning    Insulin       insulin glargine U-300 (TOUJEO SOLOSTAR) 300 UNIT/ML injection    Inject 10 Units Subcutaneous every morning          Taking Medication Assessed Today: Yes  Current Treatments: Insulin Injections, Oral Agent (monotherapy)  Dose schedule: pre-breakfast  Given by: Patient  Injection/Infusion sites: Abdomen  Problems taking diabetes medications regularly?: No  Diabetes medication side effects?: No  Treatment Compliance: All of the time    Problem Solving  Problem Solving Assessed Today: Yes  Hypoglycemia Frequency: Other(no recent symptoms since last appt )  Patient carries a carbohydrate source: Yes  Medical alert:  No    Reducing Risks  Reducing Risks Assessed Today: No    Healthy Coping  Healthy Coping Assessed Today: No    ASSESSMENT  Patient glucose has been a little higher, she stats she feels likely due to increase in stress.  Recommend she increase insulin back to 12 units daily, monitor closely with new Vini sensor.  Watch portions at meals and snacks.    Education provided today on:  AADE Self-Care Behaviors:  Monitoring: individual blood glucose targets and frequency of monitoring  Taking Medication: dosing    CGM-specific education:   Freestyle Vini sensor: insertion technique, sensor site location and rotation, insulin administration in relation to sensor placement, sensor wear, reasons to remove sensor (MRI, CT, diathermy), Vitamin C & Aspirin effects on sensor, Vini [place]: frequency of scanning sensor, length of time data is visible, use of built in glucose meter, Precision X-tra test strips, Use of trends and graphs for pattern management and problem solving, Dosing insulin based on sensor glucose results, LibreView software and email link sent to patient to download at homeOpportunities for ongoing education and support in diabetes-self management were discussed.    Pt verbalized understanding of concepts discussed and recommendations provided today.     PLAN  See Patient Instructions for co-developed, patient-stated behavior change goals.  AVS printed and provided to patient today. See Follow-Up section for recommended follow-up.    Tara Cornejo RN,CDE   Time Spent: 20 minutes  Encounter Type: Individual    Any diabetes medication dose changes were made via the CDE Protocol and Collaborative Practice Agreement with the patient's referring provider. A copy of this encounter was shared with the provider.

## 2019-09-15 ENCOUNTER — TELEPHONE (OUTPATIENT)
Dept: FAMILY MEDICINE | Facility: CLINIC | Age: 74
End: 2019-09-15

## 2019-09-15 ENCOUNTER — MYC MEDICAL ADVICE (OUTPATIENT)
Dept: EDUCATION SERVICES | Facility: CLINIC | Age: 74
End: 2019-09-15

## 2019-09-15 DIAGNOSIS — E11.69 TYPE 2 DIABETES MELLITUS WITH OTHER SPECIFIED COMPLICATION, WITH LONG-TERM CURRENT USE OF INSULIN (H): Primary | ICD-10-CM

## 2019-09-15 DIAGNOSIS — Z79.4 TYPE 2 DIABETES MELLITUS WITH OTHER SPECIFIED COMPLICATION, WITH LONG-TERM CURRENT USE OF INSULIN (H): Primary | ICD-10-CM

## 2019-09-15 NOTE — RESULT ENCOUNTER NOTE
Gerard Gaspar,    I have had the opportunity to review your recent results and an interpretation is as follows:  Your follow up basic metabolic panel shows impaired renal function - I would recommend reducing furosemide. We will plan to recheck again in 2 week     Sincerely,  Shola Benoit MD

## 2019-09-15 NOTE — TELEPHONE ENCOUNTER
Can we call Chasity Hodgson and let her know that     Gerard Gaspar,    I have had the opportunity to review your recent results and an interpretation is as follows:  Your follow up basic metabolic panel shows impaired renal function - I would recommend reducing furosemide. We will plan to recheck again in 2 week     I believe she is taking 20 mg daily with an additional 20 mg every other day.  If that is the case, I would recommend reducing to just 20 mg daily    Sincerely,  Shola Benoit MD

## 2019-09-16 ENCOUNTER — TELEPHONE (OUTPATIENT)
Dept: FAMILY MEDICINE | Facility: CLINIC | Age: 74
End: 2019-09-16

## 2019-09-16 NOTE — TELEPHONE ENCOUNTER
"Spoke with patient.     Scheduled BMP x 2 wks.     Dr Benoit---patient states she's been taking Furosemide 20mg daily and NOT the  second Furosemide 20mg tab (MWF) for a \"long time\".    Reason----would either forget---or didn't want to worry about diuresis during the middle of the day.     Do you want patient to decrease dose of Furosemide for these next 2 wks---or continue 20mg daily?    [phone]  May leave detailed msg/orders on voice mail.     Sendy HAYS RN,BSN    "

## 2019-09-16 NOTE — TELEPHONE ENCOUNTER
Reason for Call:  Other returning call    Detailed comments: Pt states that she was returning call.     Phone Number Patient can be reached at: Home number on file [phone] (home)    Best Time: Anytime     Can we leave a detailed message on this number? YES    Call taken on 9/16/2019 at 10:57 AM by Lon Thao

## 2019-09-16 NOTE — TELEPHONE ENCOUNTER
Placed call.  Information given to patient from PCP.  During call---phone disconnected.    Tried to reach patient several times---but got voice mail.     Left voice mail asking patient to return call to triage.     1. Patient needs to confirm her current Furosemide dose and recommendation from Dr Benoit.   2. Sign lab order  3. Schedule non-fasting lab appointment.        Sendy Honeycutt RN

## 2019-09-16 NOTE — TELEPHONE ENCOUNTER
Placed call.  Information given to patient from PCP.  States understood and agreed with advise.  Sendy Honeycutt RN

## 2019-09-16 NOTE — TELEPHONE ENCOUNTER
Yes, I would recommend trying to 20 mg every other day and recheck in 2 weeks    Shola Benoit MD, MD

## 2019-09-17 ENCOUNTER — HOSPITAL ENCOUNTER (OUTPATIENT)
Dept: SPEECH THERAPY | Facility: CLINIC | Age: 74
Setting detail: THERAPIES SERIES
End: 2019-09-17
Attending: INTERNAL MEDICINE
Payer: MEDICARE

## 2019-09-17 ENCOUNTER — HOSPITAL ENCOUNTER (OUTPATIENT)
Dept: GENERAL RADIOLOGY | Facility: CLINIC | Age: 74
Discharge: HOME | End: 2019-09-17
Attending: INTERNAL MEDICINE | Admitting: INTERNAL MEDICINE
Payer: MEDICARE

## 2019-09-17 ENCOUNTER — PATIENT OUTREACH (OUTPATIENT)
Dept: EDUCATION SERVICES | Facility: CLINIC | Age: 74
End: 2019-09-17
Payer: MEDICARE

## 2019-09-17 ENCOUNTER — MYC MEDICAL ADVICE (OUTPATIENT)
Dept: EDUCATION SERVICES | Facility: CLINIC | Age: 74
End: 2019-09-17

## 2019-09-17 DIAGNOSIS — Z71.3 DIETARY COUNSELING AND SURVEILLANCE: ICD-10-CM

## 2019-09-17 DIAGNOSIS — R10.10 UPPER ABDOMINAL PAIN: ICD-10-CM

## 2019-09-17 DIAGNOSIS — I10 ESSENTIAL (PRIMARY) HYPERTENSION: ICD-10-CM

## 2019-09-17 DIAGNOSIS — R13.10 DYSPHAGIA, UNSPECIFIED TYPE: ICD-10-CM

## 2019-09-17 PROCEDURE — 92611 MOTION FLUOROSCOPY/SWALLOW: CPT | Mod: GN | Performed by: SPEECH-LANGUAGE PATHOLOGIST

## 2019-09-17 PROCEDURE — 74230 X-RAY XM SWLNG FUNCJ C+: CPT

## 2019-09-17 NOTE — PROGRESS NOTES
"   09/17/19 1241       Present No   General Information   Type Of Visit Initial   Start Of Care Date 09/17/19   Referring Physician Ulises Pantoja MD   Orders Evaluate And Treat   Medical Diagnosis Dysphagia, unspecified type R13.10; Upper abdominal pain R10.10; Dietary counseling and surveillance Z71.3; Essential (primary) hypertension I10   Onset Of Illness/injury Or Date Of Surgery 09/04/19   Precautions/limitations Swallowing Precautions   Pertinent History of Current Problem/OT: Additional Occupational Profile Info This 73 year old female was referred from GI for dysphagia symptoms. Pt reported feeling of a \"shelf\" in her sternal notch where large calcium pill will get lodged. Pt denied s/sx of aspiration and reported  no difficulty with  liquids or solids. Pt expressed concern for hx of thyroid cancer (no radiation tx) and new \"lump\" that she was told is arthritis on her collarbone. GI reported hx of dilation in 2016. GI questioned if difficulty with pills was related to thyroid surgery and reported plan for VFSS and endoscopy if negative.    Respiratory Status Room air   Patient/family Goals to improve swallow function   VFSS Eval: Thin Liquid Texture Trial   Mode of Presentation, Thin Liquid straw;cup;self-fed   Preparatory Phase WFL   Oral Phase, Thin Liquid Premature pharyngeal entry   Pharyngeal Phase, Thin Liquid Delayed swallow reflex   Rosenbek's Penetration Aspiration Scale: Thin Liquid Trial Results 2 - contrast enters airway, remains above the vocal cords, no residue remains (penetration)   Diagnostic Statement flash penetration on serial gulps and straws   VFSS Eval: Puree Solid Texture Trial   Mode of Presentation, Puree spoon;self-fed   Preparatory Phase WFL   Oral Phase, Puree WFL   Pharyngeal Phase, Puree WFL   Rosenbek's Penetration Aspiration Scale: Puree Food Trial Results 1 - no aspiration, contrast does not enter airway   Diagnostic Statement WFL   VFSS Eval: " Solid Food Texture Trial   Mode of Presentation, Solid self-fed   Order of Presentation   (crackers and tablet)   Preparatory Phase WFL   Oral Phase, Solid WFL   Pharyngeal Phase, Solid WFL   Rosenbek's Penetration Aspiration Scale: Solid Food Trial Results 1 - no aspiration, contrast does not enter airway   Diagnostic Statement WFL   Swallow Compensations   Swallow Compensations Pacing;Reduce amounts   Esophageal Phase of Swallow   Patient reports or presents with symptoms of esophageal dysphagia Yes   Swallow Eval: Clinical Impressions   Functional Assessment Scale (FAS) 5   Dysphagia Outcome Severity Scale (CATERINA) Level 5 - CATERINA   Treatment Diagnosis mild oropharyngeal dysphagia   Diet texture recommendations Regular diet;Thin liquids   Recommended Feeding/Eating Techniques alternate between small bites and sips of food/liquid;hard swallow w/ each bite or sip;no straws;small sips/bites   Rehab Potential good, to achieve stated therapy goals   Demonstrates Need for Referral to Another Service   (GI)   Risks and Benefits of Treatment have been explained. Yes   Patient, family and/or staff in agreement with Plan of Care Yes   Clinical Impression Comments SLP: Pt presents with mild oropharyngeal dysphagia on Video Swallow Study. Deficits identified include: reduced tongue base retraction and delayed epiglottic inversion. These deficits resulted in flash penetration of serial gulps of thin liquids. Puree, regular solids and barium tablet trialed with no difficulty. Slowing noted in the upper esophagus. Reviewed images with pt. Pt verbalized agreement with recommended: regular diet and thin liquids with small sips, one at a time and GERD precautions. Would cut or crush large pills. Return to GI for further work up of the esophagus. No further SLP services indicated at this time.    Total Session Time   SLP Eval: VideoFluoroscopic Swallow function Minutes (70259) 20   Total Evaluation Time 20

## 2019-09-17 NOTE — TELEPHONE ENCOUNTER
Diabetes Follow-up    Subjective/Objective:    Chasity Hodgson sent in blood glucose log for review. Last date of communication was: 9/11/19.    Diabetes is being managed with   Diabetes Medications   Diabetes Medication(s)     Dipeptidyl Peptidase-4 (DPP-4) Inhibitors       sitagliptin (JANUVIA) 50 MG tablet    Take 1 tablet (50 mg) by mouth daily In the morning    Insulin       insulin glargine U-300 (TOUJEO SOLOSTAR) 300 UNIT/ML injection    Inject 12 Units Subcutaneous every morning          BG/Food Log:           Assessment/Plan:  Gerard Cochran,    I'm glad you were able to figure out the downloading of your sensor.  Your report looks good!  You do tend to rise in the evening typical of carbohydrates. Keep working on your portions at meals and making healthy choices.  No changes to your medications at this time.  You can upload your sensor again in 2 weeks.  as far as the gaps in data, when you scan the sensor it pulls in the last 8 hours of data, so if it is longer than 8 hours since the last scan then there will be a gap.  The best way to prevent the gaps is to scan the first thing when you wake and right before bed, then again during the day when you eat.    There are a couple of gaps in your data but you've scanned every 8 hours most of the time so I am not worried about an occasional gap.    Keep up the good work!    Tara Cornejo RN,CDE      Any diabetes medication dose changes were made via the CDE Protocol and Collaborative Practice Agreement with the patient's referring provider. A copy of this encounter was shared with the provider.

## 2019-09-18 ENCOUNTER — TRANSFERRED RECORDS (OUTPATIENT)
Dept: HEALTH INFORMATION MANAGEMENT | Facility: CLINIC | Age: 74
End: 2019-09-18

## 2019-09-23 ENCOUNTER — MYC MEDICAL ADVICE (OUTPATIENT)
Dept: EDUCATION SERVICES | Facility: CLINIC | Age: 74
End: 2019-09-23

## 2019-09-24 ENCOUNTER — PATIENT OUTREACH (OUTPATIENT)
Dept: EDUCATION SERVICES | Facility: CLINIC | Age: 74
End: 2019-09-24
Payer: MEDICARE

## 2019-09-24 NOTE — TELEPHONE ENCOUNTER
Diabetes Follow-up    Subjective/Objective:    Chasity Hodgson sent in blood glucose log for review. Last date of communication was: 9/17/19.  Mychart message:   Gerard Pacheco,    Just want to let you know I did download my sensor. I will go ahead and change it. I had a hard time getting in to my chart last night and this morning. But it is done now.  Thank you so much,  Sammie Hodgson    Diabetes is being managed with   Diabetes Medications   Diabetes Medication(s)     Dipeptidyl Peptidase-4 (DPP-4) Inhibitors       sitagliptin (JANUVIA) 50 MG tablet    Take 1 tablet (50 mg) by mouth daily In the morning    Insulin       insulin glargine U-300 (TOUJEO SOLOSTAR) 300 UNIT/ML injection    Inject 12 Units Subcutaneous every morning          BG/Food Log:         Assessment/Plan:  Gerard Cochran,   Thank you for uploading your sensor.  You are still doing pretty good!  No low glucose readings but you do have some after meal high readings.  No change to your insulin dose today but make sure to keep working on your portions at meals and snacks.    We have your follow up scheduled next Wednesday.  Do you feel that you need to come in or would you like to continue to upload at home and send my a mychart?     Tara Cornejo RN,CDE     Any diabetes medication dose changes were made via the CDE Protocol and Collaborative Practice Agreement with the patient's referring provider. A copy of this encounter was shared with the provider.

## 2019-09-29 ENCOUNTER — HEALTH MAINTENANCE LETTER (OUTPATIENT)
Age: 74
End: 2019-09-29

## 2019-09-30 ENCOUNTER — OFFICE VISIT (OUTPATIENT)
Dept: DERMATOLOGY | Facility: CLINIC | Age: 74
End: 2019-09-30
Payer: MEDICARE

## 2019-09-30 VITALS — SYSTOLIC BLOOD PRESSURE: 136 MMHG | HEART RATE: 55 BPM | DIASTOLIC BLOOD PRESSURE: 77 MMHG

## 2019-09-30 DIAGNOSIS — L64.9 ANDROGENETIC ALOPECIA: Primary | ICD-10-CM

## 2019-09-30 DIAGNOSIS — E11.69 TYPE 2 DIABETES MELLITUS WITH OTHER SPECIFIED COMPLICATION, WITH LONG-TERM CURRENT USE OF INSULIN (H): ICD-10-CM

## 2019-09-30 DIAGNOSIS — L30.9 DERMATITIS: ICD-10-CM

## 2019-09-30 DIAGNOSIS — L23.5 ALLERGIC DERMATITIS DUE TO OTHER CHEMICAL PRODUCT: ICD-10-CM

## 2019-09-30 DIAGNOSIS — Z79.4 TYPE 2 DIABETES MELLITUS WITH OTHER SPECIFIED COMPLICATION, WITH LONG-TERM CURRENT USE OF INSULIN (H): ICD-10-CM

## 2019-09-30 LAB
ANION GAP SERPL CALCULATED.3IONS-SCNC: 5 MMOL/L (ref 3–14)
BUN SERPL-MCNC: 32 MG/DL (ref 7–30)
CALCIUM SERPL-MCNC: 9.3 MG/DL (ref 8.5–10.1)
CHLORIDE SERPL-SCNC: 105 MMOL/L (ref 94–109)
CO2 SERPL-SCNC: 28 MMOL/L (ref 20–32)
CREAT SERPL-MCNC: 1.27 MG/DL (ref 0.52–1.04)
GFR SERPL CREATININE-BSD FRML MDRD: 42 ML/MIN/{1.73_M2}
GLUCOSE SERPL-MCNC: 130 MG/DL (ref 70–99)
POTASSIUM SERPL-SCNC: 4.5 MMOL/L (ref 3.4–5.3)
SODIUM SERPL-SCNC: 138 MMOL/L (ref 133–144)

## 2019-09-30 PROCEDURE — 36415 COLL VENOUS BLD VENIPUNCTURE: CPT | Performed by: INTERNAL MEDICINE

## 2019-09-30 PROCEDURE — 80048 BASIC METABOLIC PNL TOTAL CA: CPT | Performed by: INTERNAL MEDICINE

## 2019-09-30 ASSESSMENT — PAIN SCALES - GENERAL: PAINLEVEL: NO PAIN (0)

## 2019-09-30 NOTE — NURSING NOTE
"Dermatology Rooming Note    Chasity Hodgson's goals for this visit include:   Chief Complaint   Patient presents with     Hair Loss     Sammie is here today for a hair loss follow up- Sammie states \"I think it's a lot worse on the left side\".      Susy Mccann, RMSHARLENE     "

## 2019-09-30 NOTE — PROGRESS NOTES
"[facility] Dermatology Note      Dermatology Problem List:  1.Female pattern hair loss  - currently treating with laser comb up to 3x weekly and free and clear shampoo    2. Tinea corporis or candida/yeast infection  - currently treating with nystatin powder - improving, recommended OTC lotrimin cream     3. Rosacea  - currently treating with Vanicream    4. Venous stasis dermatitis  - currently treating with Vanicream    5. Allergic contact dermatitis  -patient uses fragrance-free products  - patch testing revealed mild reaction  to [place], fragrance mix, and  a strong reaction to paraphenylenediamine.     6. Dry scalp  - currently treated with olive oil and OTC hydrocortisone    Encounter Date: Sep 30, 2019    CC:  Chief Complaint   Patient presents with     Hair Loss     Sammie is here today for a hair loss follow up- Sammie states \"I think it's a lot worse on the left side\".          History of Present Illness:  Ms. Chasity Hodgson is a 73 year old female who presents as a follow-up for female pattern hair loss. The patient was last seen 9/10/18 for follow-up, during which time her hair loss was documented as stable. She has some concern today that the left side of her scalp has had increased hair loss. She denies increased itching, flaking, or loss of hair in clumps. She is continuing to use the free and clear shampoo as directed previously and avoids products with fragrance.     The patient also would like to discuss a new rash under her right breast which appeared 2 days ago. This is itchy and red. She tried some nystatin powder on it last night and noticed the itching and redness decreased.    Past Medical History:   Patient Active Problem List   Diagnosis     Low back pain     Mixed hyperlipidemia     Benign essential hypertension     Fibromyalgia     Allergic rhinitis     ANNETTE (obstructive sleep apnea)     Cavovarus deformity of foot     History of DVT (deep vein thrombosis)     " Hypokalemia     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)     Anemia due to blood loss, acute     ACP (advance care planning)     Postsurgical hypothyroidism     Type 2 diabetes, HbA1c goal < 7% (H)     Gastroparesis     Papillary carcinoma of thyroid (H)     Alopecia     Pulmonary nodule     Esophageal reflux     Dermatitis     Acne rosacea     Diabetic gastroparesis (H)     Poliomyelitis     Left knee pain     Carotid stenosis     Right shoulder pain     Type 2 diabetes mellitus with other specified complication (H)     Insufficiency, arterial, peripheral (H)     Allergic dermatitis due to other chemical product     Normocytic anemia     Morbid obesity (H)     Mild major depression (H)     CKD (chronic kidney disease) stage 3, GFR 30-59 ml/min (H)     Past Medical History:   Diagnosis Date     Abdominal adhesions 1984, 96,99    s/p lysis     Allergic rhinitis, cause unspecified      Carotid stenosis      CPAP (continuous positive airway pressure) dependence      Diabetic gastroparesis (H)      Diet-controlled type 2 diabetes mellitus (H)      DVT of axillary vein, acute right (H)      Fibromyalgia      Gastro-oesophageal reflux disease      Hernia of unspecified site of abdominal cavity without mention of obstruction or gangrene     bilateral     HTN (hypertension)      Hypertriglyceridemia      Obstructive sleep apnea      ANNETTE (obstructive sleep apnea)      Papillary carcinoma of thyroid (H)     s/p thyroidectomy - Ruegemer     PE (pulmonary embolism)      Poliomyelitis     poor circulation right leg     Postsurgical hypothyroidism     s/p papillary thryoid cancer - Ruegemer     Pulmonary embolism (H)      Rosacea      S/P carpal tunnel release     bilateral     S/P hardware removal 01/2014    still with lingering foot pain     S/P shoulder surgery     bilateral     Septic joint (H)     right knee     Venous insufficiency      Venous thrombosis 1999    right axillary vein     Past Surgical History:   Procedure Laterality Date      AMPUTATE TOE(S)  3/15/2012    Procedure:AMPUTATE TOE(S); Surgeon:SUKHDEEP METZ; Location: OR     APPENDECTOMY  1972     ARTHRODESIS FOOT  3/15/2012    Procedure:ARTHRODESIS FOOT; RIGHT TRIPLE ARTHRODESIS, FIFTH TOE AMPUTATION, LATERAL LIGAMENT RECONSTRUCTION, TENDON TRANSFER AND RELEASE [MINI C-ARM, ARTHREX 4.5 AND 6.7 STAPLES, BIOCOMPOSITE TENODESIS SCREWS]; Surgeon:SUKHDEEP METZ; Location: OR     C FREEING BOWEL ADHESION,ENTEROLYSIS      1986, 1996, 1999     C NONSPECIFIC PROCEDURE      throidectomy     C TOTAL ABDOM HYSTERECTOMY  1980    + BSO     CHOLECYSTECTOMY       COLOSTOMY  2/7/2012    Procedure:COLOSTOMY; CREATION OF SIGMOID COLOSTOMY AND EXTENSIVE  LYSIS OF ADHESIONS; Surgeon:MONTSERRAT BENDER; Location: OR     GI SURGERY      weakened rectal sphincter with artificial stimulator     LAPAROTOMY, LYSIS ADHESIONS, COMBINED  2/7/2012    Procedure:COMBINED LAPAROTOMY, LYSIS ADHESIONS; Surgeon:MONTSERRAT BENDER; Location: OR     RELEASE TENDON FOOT  3/15/2012    Procedure:RELEASE TENDON FOOT; Surgeon:SUKHDEEP METZ; Location: OR     REMOVE HARDWARE FOOT  12/13/2012    Procedure: REMOVE HARDWARE FOOT;  RIGHT FOOT REMOVAL OF HARDWARE;  Surgeon: Sukhdeep Metz MD;  Location:  OR       Social History:  Patient reports that she has never smoked. She has never used smokeless tobacco. She reports previous alcohol use. She reports that she does not use drugs.    Family History:  Family History   Problem Relation Age of Onset     Arthritis Mother      Hypertension Mother      Cerebrovascular Disease Mother      Obesity Mother      Heart Disease Mother         MI's     Hypertension Father      Respiratory Father         Adult RDS     Diabetes Father         adult     Arthritis Sister      Cancer Sister      Arthritis Sister      Hypertension Sister      Cancer Sister         colon polup     Heart Disease Brother         MI at 54     Hypertension Sister      Lipids  Brother      Hypertension Brother      Lipids Sister      Obesity Sister      Obesity Maternal Grandmother      Skin Cancer Maternal Grandmother         skin cancer unknown     Cancer Maternal Grandmother         unknown skin cancer on face       Medications:  Current Outpatient Medications   Medication Sig Dispense Refill     amLODIPine (NORVASC) 2.5 MG tablet Take 1 tablet (2.5 mg) by mouth daily 90 tablet 3     aspirin (SB LOW DOSE ASA EC) 81 MG EC tablet Take 81 mg by mouth daily       atenolol (TENORMIN) 50 MG tablet Take 1 tablet (50 mg) by mouth 2 times daily 180 tablet 2     Azelastine HCl (ASTEPRO NA)        BIOTIN PO Take 1,000 mcg by mouth       blood glucose (FREESTYLE LITE) test strip Test 4 times a day 150 each 11     Cholecalciferol (VITAMIN D-3 PO) Take 2 tablets by mouth       clotrimazole (LOTRIMIN) 1 % cream Apply topically daily       Continuous Glucose Monitor KIT 1 each every 14 days 1 kit 25     diphenhydrAMINE-acetaminophen (TYLENOL PM)  MG tablet Take 1 tablet by mouth At Bedtime Reported on 3/20/2017       ezetimibe (ZETIA) 10 MG tablet Take 1 tablet (10 mg) by mouth daily 90 tablet 3     famotidine (PEPCID) 40 MG tablet Take 40 mg by mouth as needed        fenofibrate (TRICOR) 145 MG tablet Take 1 tablet (145 mg) by mouth daily 90 tablet 3     ferrous sulfate (IRON) 325 (65 FE) MG tablet Take 325 mg by mouth daily (with breakfast)       fexofenadine (ALLEGRA) 180 MG tablet Take 180 mg by mouth daily. 120 0     fluticasone (FLONASE) 50 MCG/ACT spray Spray 2 sprays in nostril daily 2 sprays in each nostril qd 1 Bottle 0     furosemide (LASIX) 20 MG tablet Take 1 tablet (20 mg) by mouth daily And Take additional 20 mg in the afternoon M,W,F 135 tablet 3     gabapentin (NEURONTIN) 100 MG capsule Take 1 capsule (100 mg) by mouth every evening as needed 90 capsule 3     insulin glargine U-300 (TOUJEO SOLOSTAR) 300 UNIT/ML injection Inject 12 Units Subcutaneous every morning        levothyroxine (SYNTHROID) 112 MCG tablet Take 112 mcg by mouth daily Take 112 mcg daily except for Friday takes only 56 mcg.  Brand name Synthroid       lisinopril (PRINIVIL/ZESTRIL) 40 MG tablet Take 1 tablet (40 mg) by mouth daily 90 tablet 3     metoclopramide (REGLAN) 5 MG tablet Take 1-2 tablets (5-10 mg) by mouth 2 times daily as needed a 120 tablet 0     MULTIVITAMINS OR TABS ONE DAILY 100 3     nitroGLYcerin (NITROSTAT) 0.4 MG sublingual tablet Place 1 tablet (0.4 mg) under the tongue every 5 minutes as needed for chest pain (no more than 3 in one hour; after 3rd, call 911.) 25 tablet 3     nystatin (MYCOSTATIN) cream Apply topically daily as needed        nystatin-triamcinolone (MYCOLOG II) cream Apply topically daily as needed       ondansetron (ZOFRAN) 4 MG tablet Take 1 tablet by mouth every 6 hours as needed Reported on 3/20/2017       order for DME Equipment being ordered: Compression stockings - Knee High; 20-30 mmHg compression 3 each 0     order for DME Donut Pillow 1 each 0     order for DME Equipment being ordered: Oral appliance for sleep apnea 1 Units 0     pantoprazole (PROTONIX) 40 MG EC tablet Take 40 mg by mouth 2 times daily       sitagliptin (JANUVIA) 50 MG tablet Take 1 tablet (50 mg) by mouth daily In the morning       triamcinolone (KENALOG) 0.1 % cream Apply topically daily       Allergies   Allergen Reactions     Nsaids Difficulty breathing     Increased creatinine     Toradol Difficulty breathing     Shortness of breath     Celecoxib Itching and Rash     Codeine Itching     With higher doses     Crestor [Rosuvastatin] Muscle Pain (Myalgia)     No Clinical Screening - See Comments Itching     Fragrance     Vioxx Other (See Comments)     Heart races     Conjugated Estrogens Rash     Sulfa Drugs Rash         Review of Systems:  -As per HPI  -Constitutional: Otherwise feeling well today, in usual state of health.  -HEENT: Patient denies nonhealing oral sores.  -Skin: As above in HPI. No  additional skin concerns.    Physical exam:  Vitals: /77 (BP Location: Right arm, Patient Position: Sitting, Cuff Size: Adult Large)   Pulse 55   GEN: This is a well developed, well-nourished female in no acute distress, in a pleasant mood.    SKIN: Focused examination of the scalp and chest was performed.  -Part width of 1.2   -Layers of hair regrowth:   - first layer: 1 cm   - second layer: 3-4 cm robust  -Eyelashes and eyebrows within normal limits  -Nails without pitting, onycholysis, or onychodytrophy  -6 cm erythematous circular patch without raised edges or central clearing noted under the right medial breast.  -No other lesions of concern on areas examined.     Impression/Plan:  1. Androgenetic alopecia: clinically stable compared to photos from previous visits. [place] tree pattern present on examination.    Continue to use Free and Clear shampoo 3x weekly    Continue photobiomodulation device 3x per week    Photographs taken for future reference     2. Probable Tinea corporis: or candida infection under the right medial breast    Recommended over the counter antifungal such as lotrimin  or clotrimazole    Can continue nystatin powder if she prefers    Follow-up one  month if not improved      Follow-up in 6 months, earlier for new or changing lesions.     Staff Involved:  I, Maraynn Limon MS4, saw and examined the patient in the presence of Dr. Meyer.    Staff Physician:  I was present with the medical student who participated in the service and in the documentation of the note. I have verified the history and personally performed the physical exam and medical decision making. I agree with the assessment and plan of care as documented in the note.       Renetta Meyer MD  Professor and Chair  Department of Dermatology  [facility]: Phone: [phone], Fax:[phone]  [facility]:  Phone: [phone], Fax: [phone]

## 2019-09-30 NOTE — PATIENT INSTRUCTIONS
1. Speak with your cardiologist at your next appointment about trying minoxidil to help with hair loss.  2. For fungal infection of breast, use lamotrigine from RX.   3. Continue Clear and Free shampoo  4. Follow up in 6 months or sooner if needed

## 2019-09-30 NOTE — LETTER
"9/30/2019       RE: Chasity Hodgson  [address] [facility] [id number]     Dear Colleague,    Thank you for referring your patient, Chasity Hodgson, to the [facility] DERMATOLOGY at [facility]. Please see a copy of my visit note below.                    Pictures were placed in Pt's chart today for future reference.      [facility] Dermatology Note      Dermatology Problem List:  1.Female pattern hair loss  - currently treating with laser comb up to 3x weekly and free and clear shampoo    2. Tinea corporis or candida/yeast infection  - currently treating with nystatin powder - improving, recommended OTC lotrimin cream     3. Rosacea  - currently treating with Vanicream    4. Venous stasis dermatitis  - currently treating with Vanicream    5. Allergic contact dermatitis  -patient uses fragrance-free products  - patch testing revealed mild reaction  to [place]als[place] of [place], fragrance mix, and  a strong reaction to paraphenylenediamine.     6. Dry scalp  - currently treated with olive oil and OTC hydrocortisone    Encounter Date: Sep 30, 2019    CC:  Chief Complaint   Patient presents with     Hair Loss     Sammie is here today for a hair loss follow up- Sammie states \"I think it's a lot worse on the left side\".          History of Present Illness:  Ms. Chasity Hodgson is a 73 year old female who presents as a follow-up for female pattern hair loss. The patient was last seen 9/10/18 for follow-up, during which time her hair loss was documented as stable. She has some concern today that the left side of her scalp has had increased hair loss. She denies increased itching, flaking, or loss of hair in clumps. She is continuing to use the free and clear shampoo as directed previously and avoids products with fragrance.     The patient also would like to discuss a new rash under her right breast which appeared 2 days ago. This is itchy and red. She tried some nystatin powder " on it last night and noticed the itching and redness decreased.    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)     Anemia due to blood loss, acute     ACP (advance care planning)     Postsurgical hypothyroidism     Type 2 diabetes, HbA1c goal < 7% (H)     Gastroparesis     Papillary carcinoma of thyroid (H)     Alopecia     Pulmonary nodule     Esophageal reflux     Dermatitis     Acne rosacea     Diabetic gastroparesis (H)     Poliomyelitis     Left knee pain     Carotid stenosis     Right shoulder pain     Type 2 diabetes mellitus with other specified complication (H)     Insufficiency, arterial, peripheral (H)     Allergic dermatitis due to other chemical product     Normocytic anemia     Morbid obesity (H)     Mild major depression (H)     CKD (chronic kidney disease) stage 3, GFR 30-59 ml/min (H)     Past Medical History:   Diagnosis Date     Abdominal adhesions 1984, 96,99    s/p lysis     Allergic rhinitis, cause unspecified      Carotid stenosis      CPAP (continuous positive airway pressure) dependence      Diabetic gastroparesis (H)      Diet-controlled type 2 diabetes mellitus (H)      DVT of axillary vein, acute right (H)      Fibromyalgia      Gastro-oesophageal reflux disease      Hernia of unspecified site of abdominal cavity without mention of obstruction or gangrene     bilateral     HTN (hypertension)      Hypertriglyceridemia      Obstructive sleep apnea      ANNETTE (obstructive sleep apnea)      Papillary carcinoma of thyroid (H)     s/p thyroidectomy - Ruegemer     PE (pulmonary embolism)      Poliomyelitis     poor circulation right leg     Postsurgical hypothyroidism     s/p papillary thryoid cancer - Ruegemer      Pulmonary embolism (H)      Rosacea      S/P carpal tunnel release     bilateral     S/P hardware removal 01/2014    still with lingering foot pain     S/P shoulder surgery     bilateral     Septic joint (H)     right knee     Venous insufficiency      Venous thrombosis 1999    right axillary vein     Past Surgical History:   Procedure Laterality Date     AMPUTATE TOE(S)  3/15/2012    Procedure:AMPUTATE TOE(S); Surgeon:SUKHDEEP METZ; Location: OR     APPENDECTOMY  1972     ARTHRODESIS FOOT  3/15/2012    Procedure:ARTHRODESIS FOOT; RIGHT TRIPLE ARTHRODESIS, FIFTH TOE AMPUTATION, LATERAL LIGAMENT RECONSTRUCTION, TENDON TRANSFER AND RELEASE [MINI C-ARM, ARTHREX 4.5 AND 6.7 STAPLES, BIOCOMPOSITE TENODESIS SCREWS]; Surgeon:SUKHDEEP METZ; Location: OR      FREEING BOWEL ADHESION,ENTEROLYSIS      1986, 1996, 1999     C NONSPECIFIC PROCEDURE      throidectomy     C TOTAL ABDOM HYSTERECTOMY  1980    + BSO     CHOLECYSTECTOMY       COLOSTOMY  2/7/2012    Procedure:COLOSTOMY; CREATION OF SIGMOID COLOSTOMY AND EXTENSIVE  LYSIS OF ADHESIONS; Surgeon:MONTSERRAT BENDER; Location: OR     GI SURGERY      weakened rectal sphincter with artificial stimulator     LAPAROTOMY, LYSIS ADHESIONS, COMBINED  2/7/2012    Procedure:COMBINED LAPAROTOMY, LYSIS ADHESIONS; Surgeon:MONTSERRAT BENDER; Location: OR     RELEASE TENDON FOOT  3/15/2012    Procedure:RELEASE TENDON FOOT; Surgeon:SUKHDEEP METZ; Location: OR     REMOVE HARDWARE FOOT  12/13/2012    Procedure: REMOVE HARDWARE FOOT;  RIGHT FOOT REMOVAL OF HARDWARE;  Surgeon: Sukhdeep Metz MD;  Location:  OR       Social History:  Patient reports that she has never smoked. She has never used smokeless tobacco. She reports previous alcohol use. She reports that she does not use drugs.    Family History:  Family History   Problem Relation Age of Onset     Arthritis Mother      Hypertension Mother      Cerebrovascular Disease Mother       Obesity Mother      Heart Disease Mother         MI's     Hypertension Father      Respiratory Father         Adult RDS     Diabetes Father         adult     Arthritis Sister      Cancer Sister      Arthritis Sister      Hypertension Sister      Cancer Sister         colon polup     Heart Disease Brother         MI at 54     Hypertension Sister      Lipids Brother      Hypertension Brother      Lipids Sister      Obesity Sister      Obesity Maternal Grandmother      Skin Cancer Maternal Grandmother         skin cancer unknown     Cancer Maternal Grandmother         unknown skin cancer on face       Medications:  Current Outpatient Medications   Medication Sig Dispense Refill     amLODIPine (NORVASC) 2.5 MG tablet Take 1 tablet (2.5 mg) by mouth daily 90 tablet 3     aspirin (SB LOW DOSE ASA EC) 81 MG EC tablet Take 81 mg by mouth daily       atenolol (TENORMIN) 50 MG tablet Take 1 tablet (50 mg) by mouth 2 times daily 180 tablet 2     Azelastine HCl (ASTEPRO NA)        BIOTIN PO Take 1,000 mcg by mouth       blood glucose (FREESTYLE LITE) test strip Test 4 times a day 150 each 11     Cholecalciferol (VITAMIN D-3 PO) Take 2 tablets by mouth       clotrimazole (LOTRIMIN) 1 % cream Apply topically daily       Continuous Glucose Monitor KIT 1 each every 14 days 1 kit 25     diphenhydrAMINE-acetaminophen (TYLENOL PM)  MG tablet Take 1 tablet by mouth At Bedtime Reported on 3/20/2017       ezetimibe (ZETIA) 10 MG tablet Take 1 tablet (10 mg) by mouth daily 90 tablet 3     famotidine (PEPCID) 40 MG tablet Take 40 mg by mouth as needed        fenofibrate (TRICOR) 145 MG tablet Take 1 tablet (145 mg) by mouth daily 90 tablet 3     ferrous sulfate (IRON) 325 (65 FE) MG tablet Take 325 mg by mouth daily (with breakfast)       fexofenadine (ALLEGRA) 180 MG tablet Take 180 mg by mouth daily. 120 0     fluticasone (FLONASE) 50 MCG/ACT spray Spray 2 sprays in nostril daily 2 sprays in each nostril qd 1 Bottle 0      furosemide (LASIX) 20 MG tablet Take 1 tablet (20 mg) by mouth daily And Take additional 20 mg in the afternoon M,W,F 135 tablet 3     gabapentin (NEURONTIN) 100 MG capsule Take 1 capsule (100 mg) by mouth every evening as needed 90 capsule 3     insulin glargine U-300 (TOUJEO SOLOSTAR) 300 UNIT/ML injection Inject 12 Units Subcutaneous every morning       levothyroxine (SYNTHROID) 112 MCG tablet Take 112 mcg by mouth daily Take 112 mcg daily except for Friday takes only 56 mcg.  Brand name Synthroid       lisinopril (PRINIVIL/ZESTRIL) 40 MG tablet Take 1 tablet (40 mg) by mouth daily 90 tablet 3     metoclopramide (REGLAN) 5 MG tablet Take 1-2 tablets (5-10 mg) by mouth 2 times daily as needed a 120 tablet 0     MULTIVITAMINS OR TABS ONE DAILY 100 3     nitroGLYcerin (NITROSTAT) 0.4 MG sublingual tablet Place 1 tablet (0.4 mg) under the tongue every 5 minutes as needed for chest pain (no more than 3 in one hour; after 3rd, call 911.) 25 tablet 3     nystatin (MYCOSTATIN) cream Apply topically daily as needed        nystatin-triamcinolone (MYCOLOG II) cream Apply topically daily as needed       ondansetron (ZOFRAN) 4 MG tablet Take 1 tablet by mouth every 6 hours as needed Reported on 3/20/2017       order for DME Equipment being ordered: Compression stockings - Knee High; 20-30 mmHg compression 3 each 0     order for DME Donut Pillow 1 each 0     order for DME Equipment being ordered: Oral appliance for sleep apnea 1 Units 0     pantoprazole (PROTONIX) 40 MG EC tablet Take 40 mg by mouth 2 times daily       sitagliptin (JANUVIA) 50 MG tablet Take 1 tablet (50 mg) by mouth daily In the morning       triamcinolone (KENALOG) 0.1 % cream Apply topically daily       Allergies   Allergen Reactions     Nsaids Difficulty breathing     Increased creatinine     Toradol Difficulty breathing     Shortness of breath     Celecoxib Itching and Rash     Codeine Itching     With higher doses     Crestor [Rosuvastatin] Muscle Pain  (Myalgia)     No Clinical Screening - See Comments Itching     Fragrance     Vioxx Other (See Comments)     Heart races     Conjugated Estrogens Rash     Sulfa Drugs Rash         Review of Systems:  -As per HPI  -Constitutional: Otherwise feeling well today, in usual state of health.  -HEENT: Patient denies nonhealing oral sores.  -Skin: As above in HPI. No additional skin concerns.    Physical exam:  Vitals: /77 (BP Location: Right arm, Patient Position: Sitting, Cuff Size: Adult Large)   Pulse 55   GEN: This is a well developed, well-nourished female in no acute distress, in a pleasant mood.    SKIN: Focused examination of the scalp and chest was performed.  -Part width of 1.2   -Layers of hair regrowth:   - first layer: 1 cm   - second layer: 3-4 cm robust  -Eyelashes and eyebrows within normal limits  -Nails without pitting, onycholysis, or onychodytrophy  -6 cm erythematous circular patch without raised edges or central clearing noted under the right medial breast.  -No other lesions of concern on areas examined.     Impression/Plan:  1. Androgenetic alopecia: clinically stable compared to photos from previous visits. [place] tree pattern present on examination.    Continue to use Free and Clear shampoo 3x weekly    Continue photobiomodulation device 3x per week    Photographs taken for future reference     2. Probable Tinea corporis: or candida infection under the right medial breast    Recommended over the counter antifungal such as lotrimin  or clotrimazole    Can continue nystatin powder if she prefers    Follow-up one  month if not improved      Follow-up in 6 months, earlier for new or changing lesions.     Staff Involved:  I, Maryann Limon MS4, saw and examined the patient in the presence of Dr. Meyer.    Staff Physician:  I was present with the medical student who participated in the service and in the documentation of the note. I have verified the history and personally performed the  physical exam and medical decision making. I agree with the assessment and plan of care as documented in the note.       Renetta Meyer MD  Professor and Chair  Department of Dermatology  [facility]: Phone: [phone], Fax:[phone]  [facility]: Phone: [phone], Fax: [phone]

## 2019-10-01 NOTE — RESULT ENCOUNTER NOTE
Gerard Gaspar,    I have had the opportunity to review your recent results and an interpretation is as follows:  Your follow-up basic metabolic panel shows improvement in your creatinine indicating that your kidney function has improved slightly with reduction in your furosemide dose.  Let us continue the current dose and recheck blood pressure and labs again in another 2 to 3 weeks    Sincerely,  Shola Benoit MD

## 2019-10-02 ENCOUNTER — ALLIED HEALTH/NURSE VISIT (OUTPATIENT)
Dept: EDUCATION SERVICES | Facility: CLINIC | Age: 74
End: 2019-10-02
Payer: MEDICARE

## 2019-10-02 VITALS — BODY MASS INDEX: 39.4 KG/M2 | WEIGHT: 208.5 LBS

## 2019-10-02 DIAGNOSIS — E11.9 TYPE 2 DIABETES, HBA1C GOAL < 7% (H): Primary | ICD-10-CM

## 2019-10-02 NOTE — PATIENT INSTRUCTIONS
Work on your portions at meals, always keep a healthy with you when you are away from your house.     Add activity as you are able    Schedule your follow up with your Endocrinologist     Upload sensor and send Tara a mychart in a month, include any updates on how you are doing.

## 2019-10-02 NOTE — PROGRESS NOTES
Diabetes Follow-up    Subjective/Objective:    Chasity Hodgson stopped in for daryn sensor download and medication adjustment.   Comments: very busy caring for  and taking him to appointments, increased stress lately, trying to eat better but sometimes difficult when at appointments. Trying to get a PCA for her . Has not yet scheduled with her Endocrinologist Dr. Mcmahan.     Diabetes is being managed with   Diabetes Medications   Diabetes Medication(s)     Dipeptidyl Peptidase-4 (DPP-4) Inhibitors       sitagliptin (JANUVIA) 50 MG tablet    Take 1 tablet (50 mg) by mouth daily In the morning    Insulin       insulin glargine U-300 (TOUJEO SOLOSTAR) 300 UNIT/ML injection    Inject 12 Units Subcutaneous every morning          BG/Food Log:           Assessment/Plan:  Post meal hyperglycemia, likely due to increase stress and eating habits. Recommend she continue to work on healthy eating and portions at meals, may be beneficial to keep a healthy snack with when she is away from home.   I would not recommend increase to insulin at this time as she has experienced hypoglycemia, she does have limits on medications due to renal functions and history of thyroid cancer.  Recommend she follow up with Endocrinologist for further evaluation of glucose and medication.     Encouraged activity to improve glucose and weight loss goal.    Upload sensor and send Tara randhawahart in a month      Tara Cornejo RN,CDE     Any diabetes medication dose changes were made via the CDE Protocol and Collaborative Practice Agreement with the patient's referring provider. A copy of this encounter was shared with the provider.

## 2019-10-02 NOTE — LETTER
10/2/2019         RE: Chasity Hodgson  [address] [facility] [address]        Dear Colleague,    Thank you for referring your patient, Chasity Hodgson, to the [place] [facility] [place]. Please see a copy of my visit note below.    Diabetes Follow-up    Subjective/Objective:    Chasity Hodgson stopped in for daryn sensor download and medication adjustment.   Comments: very busy caring for  and taking him to appointments, increased stress lately, trying to eat better but sometimes difficult when at appointments. Trying to get a PCA for her . Has not yet scheduled with her Endocrinologist Dr. Mcmahan.     Diabetes is being managed with   Diabetes Medications   Diabetes Medication(s)     Dipeptidyl Peptidase-4 (DPP-4) Inhibitors       sitagliptin (JANUVIA) 50 MG tablet    Take 1 tablet (50 mg) by mouth daily In the morning    Insulin       insulin glargine U-300 (TOUJEO SOLOSTAR) 300 UNIT/ML injection    Inject 12 Units Subcutaneous every morning          BG/Food Log:           Assessment/Plan:  Post meal hyperglycemia, likely due to increase stress and eating habits. Recommend she continue to work on healthy eating and portions at meals, may be beneficial to keep a healthy snack with when she is away from home.   I would not recommend increase to insulin at this time as she has experienced hypoglycemia, she does have limits on medications due to renal functions and history of thyroid cancer.  Recommend she follow up with Endocrinologist for further evaluation of glucose and medication.     Encouraged activity to improve glucose and weight loss goal.    Upload sensor and send Tara randhawahart in a month      Tara Cornejo RN,CDE     Any diabetes medication dose changes were made via the CDE Protocol and Collaborative Practice Agreement with the patient's referring provider. A copy of this encounter was shared with the provider.

## 2019-10-04 ENCOUNTER — OFFICE VISIT (OUTPATIENT)
Dept: FAMILY MEDICINE | Facility: CLINIC | Age: 74
End: 2019-10-04
Payer: MEDICARE

## 2019-10-04 VITALS
BODY MASS INDEX: 39.46 KG/M2 | TEMPERATURE: 97.3 F | WEIGHT: 209 LBS | DIASTOLIC BLOOD PRESSURE: 57 MMHG | SYSTOLIC BLOOD PRESSURE: 151 MMHG | HEIGHT: 61 IN | OXYGEN SATURATION: 100 % | HEART RATE: 52 BPM

## 2019-10-04 DIAGNOSIS — I10 BENIGN ESSENTIAL HYPERTENSION: Primary | ICD-10-CM

## 2019-10-04 DIAGNOSIS — I73.9 INSUFFICIENCY, ARTERIAL, PERIPHERAL (H): ICD-10-CM

## 2019-10-04 LAB
ANION GAP SERPL CALCULATED.3IONS-SCNC: 7 MMOL/L (ref 3–14)
BUN SERPL-MCNC: 34 MG/DL (ref 7–30)
CALCIUM SERPL-MCNC: 9.2 MG/DL (ref 8.5–10.1)
CHLORIDE SERPL-SCNC: 104 MMOL/L (ref 94–109)
CO2 SERPL-SCNC: 27 MMOL/L (ref 20–32)
CREAT SERPL-MCNC: 1.47 MG/DL (ref 0.52–1.04)
GFR SERPL CREATININE-BSD FRML MDRD: 35 ML/MIN/{1.73_M2}
GLUCOSE SERPL-MCNC: 129 MG/DL (ref 70–99)
POTASSIUM SERPL-SCNC: 4.9 MMOL/L (ref 3.4–5.3)
SODIUM SERPL-SCNC: 139 MMOL/L (ref 133–144)

## 2019-10-04 PROCEDURE — 99213 OFFICE O/P EST LOW 20 MIN: CPT | Performed by: INTERNAL MEDICINE

## 2019-10-04 PROCEDURE — 36415 COLL VENOUS BLD VENIPUNCTURE: CPT | Performed by: INTERNAL MEDICINE

## 2019-10-04 PROCEDURE — 80048 BASIC METABOLIC PNL TOTAL CA: CPT | Performed by: INTERNAL MEDICINE

## 2019-10-04 ASSESSMENT — MIFFLIN-ST. JEOR: SCORE: 1395.4

## 2019-10-04 ASSESSMENT — PATIENT HEALTH QUESTIONNAIRE - PHQ9: SUM OF ALL RESPONSES TO PHQ QUESTIONS 1-9: 2

## 2019-10-04 NOTE — PROGRESS NOTES
"[facility]  CLINIC PROGRESS NOTE    Subjective:  Hypertension   Chasity Hodgson presents to the clinic today for routine follow up.  She has been checking her blood pressure at home and noticed that it is around 137 systolic with occasional elevations, but usually in the 130's systolic.  she continues on lisinopril, Lasix, and atenolol.  She we advised to take amlodipine at our last office visit on 08/27 and blood pressure has remained stable with only mild edema.  She is wearing compression stockings.   She has reduced her dose of lasix to every other day.      Past medical history, medications, allergies, social history, family history reviewed and updated in EPIC as of 10/4/2019 .    ROS  CONSTITUTIONAL: no fatigue, no unexpected change in weight  SKIN: no worrisome rashes, no worrisome moles, no worrisome lesions  EYES: no acute vision problems or changes  ENT: no ear problems, no mouth problems, no throat problems  RESP: no significant cough, no shortness of breath  CV: no chest pain, no palpitations, no new or worsening peripheral edema  GI: no nausea, no vomiting, no constipation, no diarrhea  : no frequency, no dysuria, no hematuria  MS: not discussed   PSYCHIATRIC: no changes in mood or affect      Objective:  Vitals  BP (!) 151/57 (BP Location: Right arm, Patient Position: Sitting, Cuff Size: Adult Regular)   Pulse 52   Temp 97.3  F (36.3  C) (Oral)   Ht 1.549 m (5' 1\")   Wt 94.8 kg (209 lb)   SpO2 100%   Breastfeeding? No   BMI 39.49 kg/m    GEN: Alert Oriented x3 NAD  HEENT: Atraumatic, normocephalic, neck supple  EXT: no edema bilateral lower extremities  NEURO: Gait and station deferred, No focal neurologic deficits  PSYCH: Mood good, affect mood congruent    No images are attached to the encounter.    No results found for this or any previous visit (from the past 24 hour(s)).    Assessment/Plan:  Patient Instructions   (I10) Benign essential hypertension  (primary encounter " diagnosis)  Comment: We will continue with current medications including amlodipine, lisinopril and atenolol with every other day dosing of lasix.  Plan: Basic metabolic panel            (I73.9) Insufficiency, arterial, peripheral (H)  Comment: recommend continued use of compression stockings  Plan: order for DME               Follow up in 1 month    15 minutes spent with patient.  Over 50% of time counseling     Disclaimer: This note consists of symbols derived from keyboarding, dictation and/or voice recognition software. As a result, there may be errors in the script that have gone undetected. Please consider this when interpreting information found in this chart.    Shola Benoit MD  [phone]

## 2019-10-04 NOTE — PATIENT INSTRUCTIONS
(I10) Benign essential hypertension  (primary encounter diagnosis)  Comment: We will continue with current medications including amlodipine, lisinopril and atenolol with every other day dosing of lasix.  Plan: Basic metabolic panel            (I73.9) Insufficiency, arterial, peripheral (H)  Comment: recommend continued use of compression stockings  Plan: order for DME

## 2019-10-05 NOTE — RESULT ENCOUNTER NOTE
Gerard Gaspar,    I have had the opportunity to review your recent results and an interpretation is as follows:  Your follow up basic metabolic panel shows again a rise in your creatinine and would like you to follow up in 2-3 weeks to recheck     Sincerely,  Shola Benoit MD

## 2019-10-07 ENCOUNTER — PATIENT OUTREACH (OUTPATIENT)
Dept: CARE COORDINATION | Facility: CLINIC | Age: 74
End: 2019-10-07

## 2019-10-07 NOTE — PROGRESS NOTES
Clinic Care Coordination Contact  [facility]/Voicemail    Referral Source: PCP  Clinical Data: Care Coordinator Outreach    Outreach attempted x 1.  Left message on patient's voicemail with call back information and requested return call.    Plan: Care Coordinator will try to reach patient again in 1-2 business days.    MARYAN Ng, [facility]  Clinic Care Coordinator  [facility]  [phone]  [email or web address]

## 2019-10-07 NOTE — LETTER
[place] CARE COORDINATION    October 11, 2019    Chasity Hodgson  [address] [address] [facility] [address]      Dear Sammie,    I am a clinic care coordinator who works with Shola Benoit MD, MD at [facility]. I wanted to introduce myself and provide you with my contact information so that you can call me with questions or concerns about your health care. Below is a description of clinic care coordination and how I can further assist you.     The clinic care coordinator is a registered nurse and/or  who understand the health care system. The goal of clinic care coordination is to help you manage your health and improve access to the [facility] system in the most efficient manner. The registered nurse can assist you in meeting your health care goals by providing education, coordinating services, and strengthening the communication among your providers. The  can assist you with financial, behavioral, psychosocial, chemical dependency, counseling, and/or psychiatric resources.    Please feel free to contact me at [phone], with any questions or concerns. We at [facility] are focused on providing you with the highest-quality healthcare experience possible and that all starts with you.     Sincerely,     SUDHAKAR Ng

## 2019-10-08 ENCOUNTER — ALLIED HEALTH/NURSE VISIT (OUTPATIENT)
Dept: FAMILY MEDICINE | Facility: CLINIC | Age: 74
End: 2019-10-08
Payer: MEDICARE

## 2019-10-08 VITALS — DIASTOLIC BLOOD PRESSURE: 60 MMHG | SYSTOLIC BLOOD PRESSURE: 146 MMHG

## 2019-10-08 DIAGNOSIS — I10 BENIGN ESSENTIAL HYPERTENSION: Primary | ICD-10-CM

## 2019-10-08 PROCEDURE — 99207 ZZC NO CHARGE NURSE ONLY: CPT | Performed by: INTERNAL MEDICINE

## 2019-10-08 NOTE — Clinical Note
Chasity SU Rema was evaluated in a specialty clinic today at which time her blood pressure was noted to be elevated.  She  has been advised to follow up with her primary provider or with a nurse only visit. We are also routing this message to her primary provider as an FYI.

## 2019-10-08 NOTE — PROGRESS NOTES
Chasity Hodgson was evaluated at [facility] on October 8, 2019 at which time her blood pressure was:    BP Readings from Last 3 Encounters:   10/08/19 (!) 146/60   10/04/19 (!) 151/57   09/30/19 136/77     Pulse Readings from Last 3 Encounters:   10/04/19 52   09/30/19 55   09/10/19 53       Reviewed lifestyle modifications for blood pressure control and reduction: including making healthy food choices, managing weight, getting regular exercise, smoking cessation, reducing alcohol consumption, monitoring blood pressure regularly.     Symptoms: None    BP Goal:< 140/90 mmHg    BP Assessment:  BP too high    Potential Reasons for BP too high: Dose of BP medication too low    BP Follow-Up Plan: Recheck BP in 30 days at pharmacy    Recommendation to Provider: Look at dose increase    Note completed by: Jessica

## 2019-10-09 ENCOUNTER — MYC MEDICAL ADVICE (OUTPATIENT)
Dept: EDUCATION SERVICES | Facility: CLINIC | Age: 74
End: 2019-10-09

## 2019-10-10 ENCOUNTER — TRANSFERRED RECORDS (OUTPATIENT)
Dept: HEALTH INFORMATION MANAGEMENT | Facility: CLINIC | Age: 74
End: 2019-10-10

## 2019-10-11 NOTE — PROGRESS NOTES
Clinic Care Coordination Contact  [facility]/Voicemail    Referral Source: PCP  Clinical Data: Care Coordinator Outreach    Outreach attempted x 2.  Left message on patient's voicemail with call back information and requested return call.    Plan: Care Coordinator will send care coordination introduction letter with care coordinator contact information and explanation of care coordination services via mail. Care Coordinator will try to reach patient again in 3-5 business days.    MARYAN Ng, [facility]  Clinic Care Coordinator  [facility]  [phone]  [email or web address]

## 2019-10-15 ENCOUNTER — MYC MEDICAL ADVICE (OUTPATIENT)
Dept: EDUCATION SERVICES | Facility: CLINIC | Age: 74
End: 2019-10-15

## 2019-10-15 ASSESSMENT — ACTIVITIES OF DAILY LIVING (ADL)
DEPENDENT_IADLS:: INDEPENDENT
DEPENDENT_IADLS:: INDEPENDENT

## 2019-10-15 NOTE — TELEPHONE ENCOUNTER
Diabetes Follow-up    Subjective/Objective:  See mychart message    Diabetes is being managed with   Diabetes Medications   Diabetes Medication(s)     Dipeptidyl Peptidase-4 (DPP-4) Inhibitors       sitagliptin (JANUVIA) 50 MG tablet    Take 1 tablet (50 mg) by mouth daily In the morning    Insulin       insulin glargine U-300 (TOUJEO SOLOSTAR) 300 UNIT/ML injection    Inject 12 Units Subcutaneous every morning          CGM report:           Assessment/Plan:    Gerard Cochran,    I did look at your report. Overall things look good but you continue to have spikes in your glucose after meals.  Continue with the 10 units of insulin as  recommended and continue to work on portions at meals and snacks.  don't forget that adding exercise can also bring down your glucose level so when you see you have a high reading or you know you ate too much then try adding the activity.     Tara Cornejo RN,CDE      Any diabetes medication dose changes were made via the CDE Protocol and Collaborative Practice Agreement with the patient's referring provider. A copy of this encounter was shared with the provider.

## 2019-10-15 NOTE — PROGRESS NOTES
Clinic Care Coordination Contact    Clinic Care Coordination Contact  OUTREACH    Referral Information:  Referral Source: PCP    Primary Diagnosis: Psychosocial    Chief Complaint   Patient presents with     Clinic Care Coordination - Initial   Clinical Concerns:  CC SW spoke with pt regarding her overall wellbeing. Pt shared that she is looking to increase in-home supports for her . Pt shared that her husbands frequently falls due not using the walker. He has never been hurt due to these falls but he does have trouble getting back up and pt is unable to assist him.    Functional Status:  Pt would like help with chores and tasks around the home and someone to stay with  while she is out. Currently, someone comes to cut grass and shovel the driveway. She is unable to navigate the stairs the the basement laundry room with a laundry basket, her  is able to help with the task at this time.    Psychosocial:  Pt explained that [facility] did an assessment with  but he was denied waiver services and MA. She is unsure why but potentially due to monthly income.    Pt's  was offered PCA 2.25 hours/day but there was a fee associated with this. She is in contact with the [employer] to discuss alternative services.    She has a [facility] with [facility] to discuss services her  may qualify for and Catastrophic insurance.    She will also be reaching out to her [employer] to discuss if there is anyone that could provide her help around her home.    Pt discussed her experience in going to Parkinson's support group.    Financial/Insurance:   Pt shared there monthly finances, she earns $1200, her  receives $2400 and $300.    Living Situation:  Pt would like to move into 1 level home or town home. She is unsure if they can afford to do this but is considering speaking with a realtor about their home value.    Resources and Interventions:  Current Resources: [facility], [facility]       Goals        Patient Stated      1. Psychosocial (pt-stated)      Goal Statement: Within the next 3 months, Sammie, would like to enlist in home support for her  for his overall wellbeing.    Measure of Success: Sammie will verbally inform Care Coordination  of success.    Supportive Steps to Achieve: CC SW will provide ongoing assistance and follow up to determine next steps to succeed in goal    Barriers: Potential barriers to time in conversing with many community agencies    Strengths: Sammie is very motivated to secure services to increase husbands safety in the home    Date to Achieve By: 1/15/2020    Patient expressed understanding of goal: Sammie verbally stated understanding of goal          Patient/Caregiver understanding: Pt verbally stated understanding     Future Appointments              In 2 weeks Shola Benoit MD [facility],     In 3 months Renetta Meyer MD [facility], [facility]      Plan: CC SW will follow up in 1 month to discuss pt's progress with [facility] and her [employer]ism. Additional resources will be provided at that time if needed. Pt was reminded of CC SW contact information and encouraged to call with questions or concerns that arise before next outreach.    MARYAN Ng, [facility]  Clinic Care Coordinator  [facility]  [phone]  [email or web address]

## 2019-10-16 ENCOUNTER — TRANSFERRED RECORDS (OUTPATIENT)
Dept: HEALTH INFORMATION MANAGEMENT | Facility: CLINIC | Age: 74
End: 2019-10-16

## 2019-10-23 ENCOUNTER — MYC MEDICAL ADVICE (OUTPATIENT)
Dept: EDUCATION SERVICES | Facility: CLINIC | Age: 74
End: 2019-10-23

## 2019-10-23 NOTE — TELEPHONE ENCOUNTER
Diabetes Follow-up    Subjective/Objective:    Patient sent in blood glucose log for review. Last date of communication was: 10/15/19  Patient comments:.  Gerard Pacheco,  I did upload my sensor last night.  I tried to send you a message through my chart but I could not get into my chart to do it so I just shut my tablet down and gave it a rest. Today it came right up.  Also I want to let you know I had two cortisone shots on October 16th. One in my left shoulder and one in my left knee.  My blood sugar shot up to 402, I did use higher dose of insulin for three days then it came back down into the one hundred range. I am back to using the 10 units of insulin. I didn't feel bad while it was up. I felt flushed, but other then that I was okay.  Anyway that's why I have some high readings. Am I suppose to send you a message when I upload my sensor, when I switch to a new one after the two weeks. I did start with a new one last evening. Jesus Pacheco    Diabetes is being managed with   Diabetes Medications   Diabetes Medication(s)     Dipeptidyl Peptidase-4 (DPP-4) Inhibitors       sitagliptin (JANUVIA) 50 MG tablet    Take 1 tablet (50 mg) by mouth daily In the morning    Insulin       insulin glargine U-300 (TOUJEO SOLOSTAR) 300 UNIT/ML injection    Inject 12 Units Subcutaneous every morning          Report:           Assessment/Plan:  Gerard Cochran,    I looked at your report and I see the high readings from the steroid injections.  Im glad your glucose readings came back down. In the future if you have a planned steroid injection or if you ever need oral steroids then reach out to us so we can give you some guidelines to prevent your glucose from getting that high.    I see you still have some post meal high sugars. How are you doing with your portions?  You may need to add another medication if you continue to have high glucose.   Do you have a plan for follow up with Dr. Mcmahan or was he planning of reviewing your glucose  results?    Tara Cornejo RN,CDE          Any diabetes medication dose changes were made via the CDE Protocol and Collaborative Practice Agreement with the patient's referring provider. A copy of this encounter was shared with the provider.

## 2019-11-04 ENCOUNTER — OFFICE VISIT (OUTPATIENT)
Dept: FAMILY MEDICINE | Facility: CLINIC | Age: 74
End: 2019-11-04
Payer: MEDICARE

## 2019-11-04 VITALS
TEMPERATURE: 97.9 F | OXYGEN SATURATION: 98 % | WEIGHT: 206 LBS | HEIGHT: 61 IN | DIASTOLIC BLOOD PRESSURE: 54 MMHG | HEART RATE: 53 BPM | SYSTOLIC BLOOD PRESSURE: 138 MMHG | BODY MASS INDEX: 38.89 KG/M2

## 2019-11-04 DIAGNOSIS — E11.69 TYPE 2 DIABETES MELLITUS WITH OTHER SPECIFIED COMPLICATION, WITH LONG-TERM CURRENT USE OF INSULIN (H): Primary | ICD-10-CM

## 2019-11-04 DIAGNOSIS — Z79.4 TYPE 2 DIABETES MELLITUS WITH OTHER SPECIFIED COMPLICATION, WITH LONG-TERM CURRENT USE OF INSULIN (H): Primary | ICD-10-CM

## 2019-11-04 DIAGNOSIS — I10 BENIGN ESSENTIAL HYPERTENSION: ICD-10-CM

## 2019-11-04 PROCEDURE — 99213 OFFICE O/P EST LOW 20 MIN: CPT | Performed by: INTERNAL MEDICINE

## 2019-11-04 PROCEDURE — 80048 BASIC METABOLIC PNL TOTAL CA: CPT | Performed by: INTERNAL MEDICINE

## 2019-11-04 PROCEDURE — 36415 COLL VENOUS BLD VENIPUNCTURE: CPT | Performed by: INTERNAL MEDICINE

## 2019-11-04 ASSESSMENT — MIFFLIN-ST. JEOR: SCORE: 1376.79

## 2019-11-04 NOTE — PROGRESS NOTES
"[facility]  CLINIC PROGRESS NOTE    Subjective:  Hypertension   Chasity Hodgson presents the clinic today for routine follow-up of her blood pressure.  At her last office visit she had been checking her blood pressure regularly at home and noted that her blood pressure was remaining in the upper 130s systolic with continued use of lisinopril Lasix and atenolol.  She did have some mild edema with addition of amlodipine.  We recommended continued use of compression stockings as well as the diuretics and she returns today for follow-up.  She is stressed more as her 's condition seems to be declining and she has some needs for support in the home.    Type 2 diabetes mellitus   In the interim between her last office visit she is also been seen in diabetic education and was recommended to continue with the 10 units of insulin to help prevent any hypoglycemic events which was noted on her continuous glucose monitoring previously.      Past medical history, medications, allergies, social history, family history reviewed and updated in EPIC as of 11/4/2019 .    ROS  CONSTITUTIONAL: Slight fatigue, no unexpected change in weight  SKIN: no worrisome rashes, no worrisome moles, no worrisome lesions  EYES: no acute vision problems or changes  ENT: no ear problems, no mouth problems, no throat problems  RESP: no significant cough, no shortness of breath  CV: no chest pain, no palpitations, no new or worsening peripheral edema  GI: no nausea, no vomiting, no constipation, no diarrhea  : Not discussed  MS: Not discussed  PSYCHIATRIC: She describes worsening sense of anxiety about the future given her 's health      Objective:  Vitals  /54 (BP Location: Right arm, Cuff Size: Adult Large)   Pulse 53   Temp 97.9  F (36.6  C) (Tympanic)   Ht 1.549 m (5' 1\")   Wt 93.4 kg (206 lb)   SpO2 98%   Breastfeeding? No   BMI 38.92 kg/m    GEN: Alert Oriented x3 NAD  HEENT: Atraumatic, normocephalic, neck " supple,    CV: RRR no murmurs or rubs  PULM: CTA no wheezes or crackles  ABD: Soft, nontender nondistended,   SKIN: No visible skin lesion or ulcerations  EXT: 2+ edema bilateral lower extremities  NEURO: Gait and station deferred, No focal neurologic deficits  PSYCH: Mood good, affect mood congruent    No images are attached to the encounter.    No results found for this or any previous visit (from the past 24 hour(s)).    Assessment/Plan:  Patient Instructions   (E11.69,  Z79.4) Type 2 diabetes mellitus with other specified complication, with long-term current use of insulin (H)  (primary encounter diagnosis)  Comment: You are doing very well from a blood glucose standpoint and we will plan to follow up in diabetic educator  Plan:     (I10) Benign essential hypertension  Comment: blood pressure is well controlled at home.  We will not make any changes at this time.    Plan:        Follow up in 3 months    Disclaimer: This note consists of symbols derived from keyboarding, dictation and/or voice recognition software. As a result, there may be errors in the script that have gone undetected. Please consider this when interpreting information found in this chart.    Shola Benoit MD  [phone]

## 2019-11-04 NOTE — PATIENT INSTRUCTIONS
(E11.69,  Z79.4) Type 2 diabetes mellitus with other specified complication, with long-term current use of insulin (H)  (primary encounter diagnosis)  Comment: You are doing very well from a blood glucose standpoint and we will plan to follow up in diabetic educator  Plan:     (I10) Benign essential hypertension  Comment: blood pressure is well controlled at home.  We will not make any changes at this time.    Plan:

## 2019-11-05 ENCOUNTER — PATIENT OUTREACH (OUTPATIENT)
Dept: EDUCATION SERVICES | Facility: CLINIC | Age: 74
End: 2019-11-05
Payer: MEDICARE

## 2019-11-05 LAB
ANION GAP SERPL CALCULATED.3IONS-SCNC: 7 MMOL/L (ref 3–14)
BUN SERPL-MCNC: 38 MG/DL (ref 7–30)
CALCIUM SERPL-MCNC: 9.4 MG/DL (ref 8.5–10.1)
CHLORIDE SERPL-SCNC: 107 MMOL/L (ref 94–109)
CO2 SERPL-SCNC: 26 MMOL/L (ref 20–32)
CREAT SERPL-MCNC: 1.25 MG/DL (ref 0.52–1.04)
GFR SERPL CREATININE-BSD FRML MDRD: 43 ML/MIN/{1.73_M2}
GLUCOSE SERPL-MCNC: 101 MG/DL (ref 70–99)
POTASSIUM SERPL-SCNC: 5.3 MMOL/L (ref 3.4–5.3)
SODIUM SERPL-SCNC: 140 MMOL/L (ref 133–144)

## 2019-11-06 ENCOUNTER — MYC MEDICAL ADVICE (OUTPATIENT)
Dept: EDUCATION SERVICES | Facility: CLINIC | Age: 74
End: 2019-11-06

## 2019-11-06 ENCOUNTER — MYC MEDICAL ADVICE (OUTPATIENT)
Dept: FAMILY MEDICINE | Facility: CLINIC | Age: 74
End: 2019-11-06

## 2019-11-06 NOTE — RESULT ENCOUNTER NOTE
Gerard Gaspar,    I have had the opportunity to review your recent results and an interpretation is as follows:  Your basic metabolic panel shows stable renal function    Sincerely,  Shola Benoit MD

## 2019-11-07 NOTE — TELEPHONE ENCOUNTER
Diabetes Follow-up    Subjective/Objective:    Chasity Hodgson downloaded or sent in blood glucose report for review.  Gerard Pacheco,  Thank you for the information to help me change the time on my scanner. I did upload the results from my scanner last evening. Thanks for your help.   Sammie Garciahl    Diabetes is being managed with   Diabetes Medications   Diabetes Medication(s)     Dipeptidyl Peptidase-4 (DPP-4) Inhibitors       sitagliptin (JANUVIA) 50 MG tablet    Take 1 tablet (50 mg) by mouth daily In the morning    Insulin       insulin glargine U-300 (TOUJEO SOLOSTAR) 300 UNIT/ML injection    Inject 12 Units Subcutaneous every morning          BG/Food log or report:         Assessment/Plan:  Janie Drummond you were able to download your sensor.  It looks like your average is better this time but you trend up in the evening and down overnight. How are you doing with your eating and portions?  There are a lot of gaps in data so don't forget you need to scan the sensor at least ever 8 hours.     Tara Cornejo RN,CDE         Any diabetes medication dose changes were made via the CDE Protocol and Collaborative Practice Agreement with the patient's referring provider. A copy of this encounter was shared with the provider.

## 2019-11-07 NOTE — TELEPHONE ENCOUNTER
Start trial again of increased amlodipine 5 mg (2 x 2.5 mg tablets).  Avoid sodium as much as possible to prevent edema    Follow up in 1-2 weeks    Shola Benoit MD, MD

## 2019-11-07 NOTE — TELEPHONE ENCOUNTER
To PCP:     Pt reports elevation in BP, is having increased arthritic knee and shoulder pain    Had brief episode of CP 11/6 prior to medication with BP reading of 180/60- pain has resolved shortly thereafter. Took medication and BP decreased to 147/64.     BP today: 177/56 and 167/63 BEFORE meds.     Would you like to adjust meds? OV on 11/4 BP was fine.     BP Readings from Last 3 Encounters:   11/04/19 138/54   10/08/19 (!) 146/60   10/04/19 (!) 151/57       Please advise,     Thank you,   Emili VAUGHN RN

## 2019-11-20 ENCOUNTER — OFFICE VISIT (OUTPATIENT)
Dept: FAMILY MEDICINE | Facility: CLINIC | Age: 74
End: 2019-11-20
Payer: MEDICARE

## 2019-11-20 VITALS
DIASTOLIC BLOOD PRESSURE: 68 MMHG | HEART RATE: 59 BPM | WEIGHT: 205 LBS | HEIGHT: 61 IN | TEMPERATURE: 97 F | SYSTOLIC BLOOD PRESSURE: 124 MMHG | OXYGEN SATURATION: 100 % | BODY MASS INDEX: 38.71 KG/M2

## 2019-11-20 DIAGNOSIS — I10 BENIGN ESSENTIAL HYPERTENSION: ICD-10-CM

## 2019-11-20 DIAGNOSIS — I10 ESSENTIAL HYPERTENSION: ICD-10-CM

## 2019-11-20 PROCEDURE — 99213 OFFICE O/P EST LOW 20 MIN: CPT | Performed by: INTERNAL MEDICINE

## 2019-11-20 RX ORDER — AMLODIPINE BESYLATE 2.5 MG/1
2.5 TABLET ORAL 2 TIMES DAILY
Qty: 180 TABLET | Refills: 3 | Status: SHIPPED | OUTPATIENT
Start: 2019-11-20 | End: 2020-11-04

## 2019-11-20 RX ORDER — ATENOLOL 50 MG/1
50 TABLET ORAL 2 TIMES DAILY
Qty: 180 TABLET | Refills: 3 | Status: SHIPPED | OUTPATIENT
Start: 2019-11-20 | End: 2020-11-04

## 2019-11-20 ASSESSMENT — MIFFLIN-ST. JEOR: SCORE: 1372.25

## 2019-11-20 NOTE — PATIENT INSTRUCTIONS
(I10) Essential hypertension  Comment: We will continue on both atenolol and amlodipine as per current doses and plan to follow up in 4 weeks  Plan: atenolol (TENORMIN) 50 MG tablet            (I10) Benign essential hypertension  Comment: as above   Plan: amLODIPine (NORVASC) 2.5 MG tablet

## 2019-11-20 NOTE — PROGRESS NOTES
"[facility]  CLINIC PROGRESS NOTE    Subjective:   Essential hypertension  Benign essential hypertension    Chasity Hodgson has started taking amlodipine 2.5 mg twice per day and blood pressure has improved considerably but has created worsening edema.   She is aware of eating a low sodium diet.   She is cooking the food that she and her  eat.      Past medical history, medications, allergies, social history, family history reviewed and updated in Psychiatric as of 11/20/2019 .    ROS  CONSTITUTIONAL: no fatigue, no unexpected change in weight  SKIN: no worrisome rashes, no worrisome moles, no worrisome lesions  EYES: no acute vision problems or changes  ENT: no ear problems, no mouth problems, no throat problems  RESP: no significant cough, no shortness of breath  CV: no chest pain, no palpitations,  + worsening peripheral edema  GI: no nausea, no vomiting, no constipation, no diarrhea  : no frequency, no dysuria, no hematuria  MS: chronic pain issues  PSYCHIATRIC: no changes in mood or affect - coping well       Objective:  Vitals  /68 (BP Location: Right arm, Patient Position: Chair, Cuff Size: Adult Large)   Pulse 59   Temp 97  F (36.1  C) (Oral)   Ht 1.549 m (5' 1\")   Wt 93 kg (205 lb)   SpO2 100%   Breastfeeding No   BMI 38.73 kg/m    GEN: Alert Oriented x3 NAD  HEENT: Atraumatic, normocephalic, neck supple,   CV: RRR no murmurs or rubs  PULM: CTA no wheezes or crackles  ABD: Obese, soft, nontender nondistended   SKIN: No visible skin lesion or ulcerations  EXT: 2+ edema bilateral lower extremities  NEURO: Gait and station deferred, No focal neurologic deficits  PSYCH: Mood good, affect mood congruent    No images are attached to the encounter.    No results found for this or any previous visit (from the past 24 hour(s)).    Assessment/Plan:  Patient Instructions   (I10) Essential hypertension  Comment: We will continue on both atenolol and amlodipine as per current doses and plan to " follow up in 4 weeks  Plan: atenolol (TENORMIN) 50 MG tablet            (I10) Benign essential hypertension  Comment: as above   Plan: amLODIPine (NORVASC) 2.5 MG tablet               Follow up in 4 weeks    Disclaimer: This note consists of symbols derived from keyboarding, dictation and/or voice recognition software. As a result, there may be errors in the script that have gone undetected. Please consider this when interpreting information found in this chart.    Shola Benoit MD  [phone]

## 2019-11-21 ENCOUNTER — MYC MEDICAL ADVICE (OUTPATIENT)
Dept: EDUCATION SERVICES | Facility: CLINIC | Age: 74
End: 2019-11-21

## 2019-11-21 ENCOUNTER — PATIENT OUTREACH (OUTPATIENT)
Dept: EDUCATION SERVICES | Facility: CLINIC | Age: 74
End: 2019-11-21
Payer: MEDICARE

## 2019-11-21 ENCOUNTER — PATIENT OUTREACH (OUTPATIENT)
Dept: CARE COORDINATION | Facility: CLINIC | Age: 74
End: 2019-11-21

## 2019-11-21 DIAGNOSIS — E11.9 TYPE 2 DIABETES, HBA1C GOAL < 7% (H): Primary | ICD-10-CM

## 2019-11-21 ASSESSMENT — ACTIVITIES OF DAILY LIVING (ADL): DEPENDENT_IADLS:: INDEPENDENT

## 2019-11-21 NOTE — TELEPHONE ENCOUNTER
Diabetes Follow-up    Subjective/Objective:    Chasity Hodgson downloaded or sent in blood glucose report for review.       Diabetes is being managed with   Diabetes Medications   Diabetes Medication(s)     Dipeptidyl Peptidase-4 (DPP-4) Inhibitors       sitagliptin (JANUVIA) 50 MG tablet    Take 1 tablet (50 mg) by mouth daily In the morning    Insulin       insulin glargine U-300 (TOUJEO SOLOSTAR) 300 UNIT/ML injection    Inject 12 Units Subcutaneous every morning          BG/Food log or report:             Assessment/Plan:  Alverto Drummond did an update on there website so it may of needed some additional information. You didn't actually go low on the 8th but maybe was trending down. Did you scan your sensor or test your blood sugar when you were having symptoms?  I would think your blood sugar went up that night due to the injection and not the apple juice.  Your average is better this time but you did have a couple of morning lows on Saturday and Sunday and a couple in the afternoon/evening time before dinner.  Bt there are also some significant rises after some of your meals.     To keep you safe between meals lets have you decrease your insulin from 10 units to 8 units. Work hard on your portions and keep a food record again.  Flolow up with me in clinic in a couple of weeks.  I have limited availability over the next month so give me your best days and times to work with and we will get you in.     Tara Cornejo RN,CDE         Any diabetes medication dose changes were made via the CDE Protocol and Collaborative Practice Agreement with the patient's referring provider. A copy of this encounter was shared with the provider.

## 2019-11-21 NOTE — PROGRESS NOTES
Clinic Care Coordination Contact    Follow Up Progress Note      Assessment: BAKARI ELENA spoke with pt regarding her and her husbands overall wellbeing. Pt states that her  seems to be getting worse. He continues to bump into things around the house and falls. Pt doesn't know how to help him as his legs are weak and he moves very slowly. Pt explained that he takes 2.5 hours to get dressed and 2 hours to eat breakfast. Pt would like guidance on what supports to start arranging and to learn how fast her husbands abilities will decline.     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 She walks with a cane. She will be going to see an orthopedist on 11/26 to discuss treatment. Due to her physical health it is difficult to care her .    She is in contact with the [employer] [employer] [facility] to discuss MA eligibility due to changes in income (pt is fully retired now). She had a meeting with the [facility], they discussed programs that pt's  could attend and possible insurances that he could qualify for to give additional support for medication cost. She was told about [facility] [employer] [facility] in [facility] to get help from veterans. BAKARI ELENA provided the phone number for the [employer] location. Pt will also be outreaching to her Muslim for assistance.    Goals addressed this encounter:   Goals Addressed                 This Visit's Progress       Patient Stated      1. Psychosocial (pt-stated)   On track     Goal Statement: Within the next 3 months, Sammie, would like to enlist in in-home support for her  for his overall wellbeing.    Measure of Success: Sammie will verbally inform Care Coordination  of success.    Supportive Steps to Achieve: BAKARI ELENA will provide ongoing assistance and follow up to determine next steps to succeed in goal    Barriers: Potential barriers to time in conversing with many community agencies    Strengths: Sammie is very motivated to secure services to increase husbands safety  in the home    Date to Achieve By: 1/15/2020    Patient expressed understanding of goal: Sammie verbally stated understanding of goal        Plan: CC SW will continue to follow up with pt regarding goal progression. Pt was reminded of CC SW contact information and encouraged to call with questions or concerns that arise before next outreach.    Care Coordinator will follow up in 1 month    MARYAN Ng, [facility]  Clinic Care Coordinator  [facility]  [phone]  [email or web address]

## 2019-11-22 ENCOUNTER — MYC MEDICAL ADVICE (OUTPATIENT)
Dept: EDUCATION SERVICES | Facility: CLINIC | Age: 74
End: 2019-11-22

## 2019-12-03 ENCOUNTER — TRANSFERRED RECORDS (OUTPATIENT)
Dept: HEALTH INFORMATION MANAGEMENT | Facility: CLINIC | Age: 74
End: 2019-12-03

## 2019-12-04 ENCOUNTER — MYC MEDICAL ADVICE (OUTPATIENT)
Dept: EDUCATION SERVICES | Facility: CLINIC | Age: 74
End: 2019-12-04

## 2019-12-05 ENCOUNTER — PATIENT OUTREACH (OUTPATIENT)
Dept: EDUCATION SERVICES | Facility: CLINIC | Age: 74
End: 2019-12-05
Payer: MEDICARE

## 2019-12-05 NOTE — TELEPHONE ENCOUNTER
I agree with your recommendations    she take 12 units the day of the injection(12/6/19) , 10 units the day after(12/7/19) the injection and resume the 8 units on day 3rd day(12/8/19).      Thank you    Shola Benoit MD, MD

## 2019-12-05 NOTE — TELEPHONE ENCOUNTER
Diabetes Follow-up    Subjective/Objective:    Patient comments/concerns: [employer] message:  Gerard Pacheco, I am scheduled for a injection in my left hip this Friday. I know you said to let you know and you would let me know what to do to help keep my blood sugar under control. I have very bad arthritis in that hip, Dr Henderson says it is bone on bone and he feels it is radiating down into my hip. Please let me know if there is. Anything different I should do for Friday.  Thank you,  Sammie Hodgson    Diabetes is being managed with   Diabetes Medications   Diabetes Medication(s)     Dipeptidyl Peptidase-4 (DPP-4) Inhibitors       sitagliptin (JANUVIA) 50 MG tablet    Take 1 tablet (50 mg) by mouth daily In the morning    Insulin       insulin glargine U-300 (TOUJEO) 300 UNIT/ML (1 units dial) pen    Inject 8 Units Subcutaneous every morning        Previous steroid injection on October 16th:CGM report:      Assessment/Plan:    Per previous history after steroid injection she experienced hyperglycemia with glucose greater than 350mg/dl within 8 hours from injection and lasting hyperglycemia for 48 hours after the injection.    Based on current insulin dose of 8 units/day a 10-20% increase likely not to cover the glucose rise.  Recommend she take 12 units the day of the injection(12/6/19) , 10 units the day after(12/7/19) the injection and resume the 8 units on day 3rd day(12/8/19).      Dose increase is greater than CDE protocol, will route to PCP. Please note if you agree or indicate alternate plan.      Tara Cornejo RN,CDE     Any diabetes medication dose changes were made via the CDE Protocol and Collaborative Practice Agreement with the patient's referring provider. A copy of this encounter was shared with the provider.

## 2019-12-06 ENCOUNTER — MYC MEDICAL ADVICE (OUTPATIENT)
Dept: EDUCATION SERVICES | Facility: CLINIC | Age: 74
End: 2019-12-06

## 2019-12-06 NOTE — TELEPHONE ENCOUNTER
Diabetes Follow-up    Subjective/Objective:    Chasity Hodgson sent in blood glucose log for review. Last date of communication was: 12/5/2019.    Diabetes is being managed with   Diabetes Medications   Diabetes Medication(s)     Dipeptidyl Peptidase-4 (DPP-4) Inhibitors       sitagliptin (JANUVIA) 50 MG tablet    Take 1 tablet (50 mg) by mouth daily In the morning    Insulin       insulin glargine U-300 (TOUJEO) 300 UNIT/ML (1 units dial) pen    Inject 8 Units Subcutaneous every morning          BG/Food Log:           Assessment:  Pams BG are rising after lunch and slowly coming back down over the course of the night.  Considering Sammie is receiving a cortisone injection today and has a plan already set for adjusted insulin dose, do not recommend any changes at this time.  Follow-up at scheduled appointment in 1.5 weeks.     Plan/Response:  See Patient Instructions for co-developed, patient-stated behavior change goals.  Continue with Tara's recommendations for increased insulin dose with steroid injection.  Follow-up at scheduled clinic visit on 12/18    Stephanie Arciniega RD, CHAZ, EDISONE    Any diabetes medication dose changes were made via the CDE Protocol and Collaborative Practice Agreement with the patient's referring provider. A copy of this encounter was shared with the provider.

## 2019-12-17 ENCOUNTER — OFFICE VISIT (OUTPATIENT)
Dept: FAMILY MEDICINE | Facility: CLINIC | Age: 74
End: 2019-12-17
Payer: MEDICARE

## 2019-12-17 VITALS
TEMPERATURE: 97.6 F | WEIGHT: 209 LBS | BODY MASS INDEX: 39.46 KG/M2 | DIASTOLIC BLOOD PRESSURE: 72 MMHG | OXYGEN SATURATION: 98 % | SYSTOLIC BLOOD PRESSURE: 130 MMHG | HEIGHT: 61 IN | HEART RATE: 58 BPM

## 2019-12-17 DIAGNOSIS — R35.0 URINARY FREQUENCY: Primary | ICD-10-CM

## 2019-12-17 DIAGNOSIS — N30.00 ACUTE CYSTITIS WITHOUT HEMATURIA: ICD-10-CM

## 2019-12-17 DIAGNOSIS — R82.90 NONSPECIFIC FINDING ON EXAMINATION OF URINE: ICD-10-CM

## 2019-12-17 LAB
ALBUMIN UR-MCNC: NEGATIVE MG/DL
APPEARANCE UR: CLEAR
BACTERIA #/AREA URNS HPF: ABNORMAL /HPF
BILIRUB UR QL STRIP: NEGATIVE
COLOR UR AUTO: YELLOW
GLUCOSE UR STRIP-MCNC: NEGATIVE MG/DL
HGB UR QL STRIP: NEGATIVE
KETONES UR STRIP-MCNC: NEGATIVE MG/DL
LEUKOCYTE ESTERASE UR QL STRIP: ABNORMAL
NITRATE UR QL: NEGATIVE
NON-SQ EPI CELLS #/AREA URNS LPF: ABNORMAL /LPF
PH UR STRIP: 5 PH (ref 5–7)
RBC #/AREA URNS AUTO: ABNORMAL /HPF
SOURCE: ABNORMAL
SP GR UR STRIP: 1.02 (ref 1–1.03)
UROBILINOGEN UR STRIP-ACNC: 0.2 EU/DL (ref 0.2–1)
WBC #/AREA URNS AUTO: ABNORMAL /HPF

## 2019-12-17 PROCEDURE — 99213 OFFICE O/P EST LOW 20 MIN: CPT | Performed by: NURSE PRACTITIONER

## 2019-12-17 PROCEDURE — 87086 URINE CULTURE/COLONY COUNT: CPT | Performed by: NURSE PRACTITIONER

## 2019-12-17 PROCEDURE — 81001 URINALYSIS AUTO W/SCOPE: CPT | Performed by: NURSE PRACTITIONER

## 2019-12-17 RX ORDER — CEPHALEXIN 500 MG/1
500 CAPSULE ORAL 2 TIMES DAILY
Qty: 14 CAPSULE | Refills: 0 | Status: SHIPPED | OUTPATIENT
Start: 2019-12-17 | End: 2020-01-21

## 2019-12-17 ASSESSMENT — MIFFLIN-ST. JEOR: SCORE: 1390.4

## 2019-12-17 NOTE — PROGRESS NOTES
Subjective     Chasity Hodgson is a 74 year old female who presents to clinic today for the following health issues:    HPI   URINARY TRACT SYMPTOMS      Duration: started Thursday 12-12-19 .  Burning at night , morning ok     Description  urgency and burning    Intensity:  Worsening throughout the day    Accompanying signs and symptoms:  Fever/chills: no   Flank pain  left low back pain  Nausea and vomiting: yes nausea 2 days ago  Vaginal symptoms: none  Abdominal/Pelvic Pain: YES mild lower abd pain     History  History of frequent UTI's: yes 6 - 8 years ago when younger   History of kidney stones: no   Sexually Active: no   Possibility of pregnancy: No    Precipitating or alleviating factors: increased fluid    Therapies tried and outcome: one pill 12-3-19 from Dr. Jiménez  After cystoscopy          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)     Anemia due to blood loss, acute     ACP (advance care planning)     Postsurgical hypothyroidism     Type 2 diabetes, HbA1c goal < 7% (H)     Gastroparesis     Papillary carcinoma of thyroid (H)     Alopecia     Pulmonary nodule     Esophageal reflux     Dermatitis     Acne rosacea     Diabetic gastroparesis (H)     Poliomyelitis     Left knee pain     Carotid stenosis     Right shoulder pain     Type 2 diabetes mellitus with other specified complication (H)     Insufficiency, arterial, peripheral (H)     Allergic dermatitis due to other chemical product     Normocytic anemia     Morbid obesity (H)     Mild major depression (H)     CKD (chronic kidney disease) stage 3, GFR 30-59 ml/min (H)     Past Surgical History:   Procedure Laterality Date     AMPUTATE TOE(S)  3/15/2012    Procedure:AMPUTATE TOE(S);  Surgeon:SUKHDEEP METZ; Location: OR     APPENDECTOMY  1972     ARTHRODESIS FOOT  3/15/2012    Procedure:ARTHRODESIS FOOT; RIGHT TRIPLE ARTHRODESIS, FIFTH TOE AMPUTATION, LATERAL LIGAMENT RECONSTRUCTION, TENDON TRANSFER AND RELEASE [MINI C-ARM, ARTHREX 4.5 AND 6.7 STAPLES, BIOCOMPOSITE TENODESIS SCREWS]; Surgeon:SUKHDEEP METZ; Location: OR     C FREEING BOWEL ADHESION,ENTEROLYSIS      1986, 1996, 1999     C NONSPECIFIC PROCEDURE      throidectomy     C TOTAL ABDOM HYSTERECTOMY  1980    + BSO     CHOLECYSTECTOMY       COLOSTOMY  2/7/2012    Procedure:COLOSTOMY; CREATION OF SIGMOID COLOSTOMY AND EXTENSIVE  LYSIS OF ADHESIONS; Surgeon:MONTSERRAT BENDER; Location: OR     GI SURGERY      weakened rectal sphincter with artificial stimulator     LAPAROTOMY, LYSIS ADHESIONS, COMBINED  2/7/2012    Procedure:COMBINED LAPAROTOMY, LYSIS ADHESIONS; Surgeon:MONTSERRAT BENDER; Location: OR     RELEASE TENDON FOOT  3/15/2012    Procedure:RELEASE TENDON FOOT; Surgeon:SUKHDEEP METZ; Location: OR     REMOVE HARDWARE FOOT  12/13/2012    Procedure: REMOVE HARDWARE FOOT;  RIGHT FOOT REMOVAL OF HARDWARE;  Surgeon: Sukhdeep Metz MD;  Location:  OR       Social History     Tobacco Use     Smoking status: Never Smoker     Smokeless tobacco: Never Used   Substance Use Topics     Alcohol use: Not Currently     Alcohol/week: 0.0 standard drinks     Family History   Problem Relation Age of Onset     Arthritis Mother      Hypertension Mother      Cerebrovascular Disease Mother      Obesity Mother      Heart Disease Mother         MI's     Hypertension Father      Respiratory Father         Adult RDS     Diabetes Father         adult     Arthritis Sister      Cancer Sister      Arthritis Sister      Hypertension Sister      Cancer Sister         colon polup     Heart Disease Brother         MI at 54     Hypertension Sister      Lipids Brother      Hypertension Brother      Lipids Sister       Obesity Sister      Obesity Maternal Grandmother      Skin Cancer Maternal Grandmother         skin cancer unknown     Cancer Maternal Grandmother         unknown skin cancer on face         Current Outpatient Medications   Medication Sig Dispense Refill     aspirin (SB LOW DOSE ASA EC) 81 MG EC tablet Take 81 mg by mouth daily       atenolol (TENORMIN) 50 MG tablet Take 1 tablet (50 mg) by mouth 2 times daily 180 tablet 3     Azelastine HCl (ASTEPRO NA)        BIOTIN PO Take 1,000 mcg by mouth       blood glucose (FREESTYLE LITE) test strip Test 4 times a day 150 each 11     Cholecalciferol (VITAMIN D-3 PO) Take 2 tablets by mouth       clotrimazole (LOTRIMIN) 1 % cream Apply topically daily       Continuous Glucose Monitor KIT 1 each every 14 days 1 kit 25     diphenhydrAMINE-acetaminophen (TYLENOL PM)  MG tablet Take 1 tablet by mouth At Bedtime Reported on 3/20/2017       ezetimibe (ZETIA) 10 MG tablet Take 1 tablet (10 mg) by mouth daily 90 tablet 3     famotidine (PEPCID) 40 MG tablet Take 40 mg by mouth as needed        fenofibrate (TRICOR) 145 MG tablet Take 1 tablet (145 mg) by mouth daily 90 tablet 3     ferrous sulfate (IRON) 325 (65 FE) MG tablet Take 325 mg by mouth daily (with breakfast)       fexofenadine (ALLEGRA) 180 MG tablet Take 180 mg by mouth daily. 120 0     fluticasone (FLONASE) 50 MCG/ACT spray Spray 2 sprays in nostril daily 2 sprays in each nostril qd 1 Bottle 0     furosemide (LASIX) 20 MG tablet Take 1 tablet (20 mg) by mouth daily And Take additional 20 mg in the afternoon M,W,F 135 tablet 3     gabapentin (NEURONTIN) 100 MG capsule Take 1 capsule (100 mg) by mouth every evening as needed 90 capsule 3     insulin glargine U-300 (TOUJEO) 300 UNIT/ML (1 units dial) pen Inject 8 Units Subcutaneous every morning 15 mL 1     levothyroxine (SYNTHROID) 112 MCG tablet Take 112 mcg by mouth daily Take 112 mcg daily except for Friday takes only 56 mcg.  Brand name Synthroid        lisinopril (PRINIVIL/ZESTRIL) 40 MG tablet Take 1 tablet (40 mg) by mouth daily 90 tablet 3     metoclopramide (REGLAN) 5 MG tablet Take 1-2 tablets (5-10 mg) by mouth 2 times daily as needed a 120 tablet 0     MULTIVITAMINS OR TABS ONE DAILY 100 3     nitroGLYcerin (NITROSTAT) 0.4 MG sublingual tablet Place 1 tablet (0.4 mg) under the tongue every 5 minutes as needed for chest pain (no more than 3 in one hour; after 3rd, call 911.) 25 tablet 3     nystatin (MYCOSTATIN) cream Apply topically daily as needed        nystatin-triamcinolone (MYCOLOG II) cream Apply topically daily as needed       ondansetron (ZOFRAN) 4 MG tablet Take 1 tablet by mouth every 6 hours as needed Reported on 3/20/2017       order for DME Equipment being ordered: Compression stockings - Knee High; 20-30 mmHg compression - note would like adhesive band to keep the stocking from sliding down 3 each 0     order for DME Donut Pillow 1 each 0     order for DME Equipment being ordered: Oral appliance for sleep apnea 1 Units 0     pantoprazole (PROTONIX) 40 MG EC tablet Take 40 mg by mouth 2 times daily       sitagliptin (JANUVIA) 50 MG tablet Take 1 tablet (50 mg) by mouth daily In the morning       triamcinolone (KENALOG) 0.1 % cream Apply topically daily       amLODIPine (NORVASC) 2.5 MG tablet Take 1 tablet (2.5 mg) by mouth 2 times daily (Patient not taking: Reported on 12/17/2019) 180 tablet 3     Allergies   Allergen Reactions     Nsaids Difficulty breathing     Increased creatinine     Toradol Difficulty breathing     Shortness of breath     Celecoxib Itching and Rash     Codeine Itching     With higher doses     Crestor [Rosuvastatin] Muscle Pain (Myalgia)     No Clinical Screening - See Comments Itching     Fragrance     Vioxx Other (See Comments)     Heart races     Conjugated Estrogens Rash     Sulfa Drugs Rash       Reviewed and updated as needed this visit by Provider         Review of Systems   ROS COMP: Constitutional, HEENT,  "cardiovascular, pulmonary, gi and gu systems are negative, except as otherwise noted.      Objective      /72 (BP Location: Right arm, Patient Position: Chair, Cuff Size: Adult Large)   Pulse 58   Temp 97.6  F (36.4  C) (Tympanic)   Ht 1.549 m (5' 1\")   Wt 94.8 kg (209 lb)   SpO2 98%   BMI 39.49 kg/m      Physical Exam   GENERAL: healthy, alert and no distress  ABDOMEN: soft, nontender,   BACK: no CVA tenderness,     Diagnostic Test Results:  Results for orders placed or performed in visit on 12/17/19   UA with Microscopic reflex to Culture     Status: Abnormal   Result Value Ref Range    Color Urine Yellow     Appearance Urine Clear     Glucose Urine Negative NEG^Negative mg/dL    Bilirubin Urine Negative NEG^Negative    Ketones Urine Negative NEG^Negative mg/dL    Specific Gravity Urine 1.025 1.003 - 1.035    pH Urine 5.0 5.0 - 7.0 pH    Protein Albumin Urine Negative NEG^Negative mg/dL    Urobilinogen Urine 0.2 0.2 - 1.0 EU/dL    Nitrite Urine Negative NEG^Negative    Blood Urine Negative NEG^Negative    Leukocyte Esterase Urine Small (A) NEG^Negative    Source Midstream Urine     WBC Urine  (A) OTO5^0 - 5 /HPF    RBC Urine O - 2 OTO2^O - 2 /HPF    Squamous Epithelial /LPF Urine Few FEW^Few /LPF    Bacteria Urine Few (A) NEG^Negative /HPF           Assessment & Plan       ICD-10-CM    1. Urinary frequency R35.0 UA with Microscopic reflex to Culture   2. Nonspecific finding on examination of urine R82.90 Urine Culture Aerobic Bacterial   3. Acute cystitis without hematuria N30.00 cephALEXin (KEFLEX) 500 MG capsule        Patient Instructions   Be sure to drink a lot of water while you have a UTI and also to prevent infections         MAGALY Mittal [facility]    "

## 2019-12-18 ENCOUNTER — ALLIED HEALTH/NURSE VISIT (OUTPATIENT)
Dept: EDUCATION SERVICES | Facility: CLINIC | Age: 74
End: 2019-12-18
Payer: MEDICARE

## 2019-12-18 VITALS — WEIGHT: 206.4 LBS | BODY MASS INDEX: 39 KG/M2

## 2019-12-18 DIAGNOSIS — E11.9 TYPE 2 DIABETES, HBA1C GOAL < 7% (H): Primary | ICD-10-CM

## 2019-12-18 LAB
BACTERIA SPEC CULT: NORMAL
SPECIMEN SOURCE: NORMAL

## 2019-12-18 PROCEDURE — G0108 DIAB MANAGE TRN  PER INDIV: HCPCS

## 2019-12-18 NOTE — Clinical Note
Magy,I see you've worked with Sammie in the past regarding her home situation and caring for her .  She was very overwhelmed today and I think she would benefit from some assistance with her home situation.Can you reach out to her? Thank you,Tara Cornejo RN,CDE

## 2019-12-18 NOTE — PROGRESS NOTES
Diabetes Self-Management Education & Support      Diabetes Education Self-Management & Training: Follow-up and Continuous Glucose Monitor Download    Chasity Hodgson presents today for download and report review of Personal continuous glucose monitor device    Comments/concerns: overwhelmed with caring for , having a hard time taking care of own needs. Don't eat a lot but at times not eating right because working around husbands appointment times and meal time preferences.       Current Diabetes Management per Patient:  Diabetes Medication(s)     Dipeptidyl Peptidase-4 (DPP-4) Inhibitors       sitagliptin (JANUVIA) 50 MG tablet    Take 1 tablet (50 mg) by mouth daily In the morning    Insulin       insulin glargine U-300 (TOUJEO) 300 UNIT/ML (1 units dial) pen    Inject 8 Units Subcutaneous every morning          Taking Medication Assessed Today: Yes  Current Treatments: Insulin Injections  Dose schedule: pre-breakfast  Given by: Patient  Injection/Infusion sites: Abdomen  Problems taking diabetes medications regularly?: No  Diabetes medication side effects?: No  Treatment Compliance: All of the time    Most Recent A1c Result:    Lab Results   Component Value Date    A1C 6.8 09/10/2019       Continuous Glucose Monitor Interpretation     Reports:               Healthy Eating  Healthy Eating Assessed Today: No  Cultural/Yazidism diet restrictions?: No  Meal planning: Avoiding sweets, Carbohydrate counting, Low salt, Smaller portions, Plate planning method  Meals include: Breakfast, Lunch, Dinner, Snacks  Breakfast: cereal or eggs and toast  Lunch: turkey and cheese wit bread/muffin  Dinner: meatloaf Or hamburger with small fries  Beverages: Water, Milk, Diet soda  Has patient met with a dietitian in the past?: Yes    Being Active  Being Active Assessed Today: Yes  Exercise:: Currently not exercising  Barrier to exercise: Time, Physical limitation    Monitoring  Monitoring Assessed Today: Yes  Did patient bring  glucose meter to appointment? : Yes  Blood Glucose Meter: FreeStyle  Home Glucose (Sugar) Monitoring: 3-4 times per day  Blood glucose trend: Fluctuating minimally    Taking Medications  Diabetes Medication(s)     Dipeptidyl Peptidase-4 (DPP-4) Inhibitors       sitagliptin (JANUVIA) 50 MG tablet    Take 1 tablet (50 mg) by mouth daily In the morning    Insulin       insulin glargine U-300 (TOUJEO) 300 UNIT/ML (1 units dial) pen    Inject 8 Units Subcutaneous every morning          Taking Medication Assessed Today: Yes  Current Treatments: Insulin Injections  Dose schedule: pre-breakfast  Given by: Patient  Injection/Infusion sites: Abdomen  Problems taking diabetes medications regularly?: No  Diabetes medication side effects?: No  Treatment Compliance: All of the time    Problem Solving  Problem Solving Assessed Today: Yes  Hypoglycemia Frequency: Other(no recent symptoms since last appt )  Patient carries a carbohydrate source: Yes  Medical alert: No    Hypoglycemia symptoms  Confusion: No  Dizziness or Light-Headedness: Yes  Headaches: No  Mood changes: No  Nervousness/Anxiety: No  Sleepiness: Yes  Speech difficulty: No  Sweats: No  Tremors: Yes    Hypoglycemia Complications  Blackouts: No  Hospitalization: No  Nocturnal hypoglycemia: No  Required assistance: No  Required glucagon injection: No  Seizures: No    Reducing Risks  Reducing Risks Assessed Today: No  Diabetes Risks: Age over 45 years, Family History, Sedentary Lifestyle  CAD Risks: Diabetes Mellitus, Family history, Obesity, Post-menopausal, Sedentary lifestyle  Has dilated eye exam at least once a year?: Yes  Sees dentist every 6 months?: Yes  Sees podiatrist (foot doctor)?: Yes    Healthy Coping  Healthy Coping Assessed Today: No  Emotional response to diabetes: Ready to learn  Informal Support system:: Family  Stage of change: ACTION (Actively working towards change)  Difficulty affording diabetes management supplies?: No  Support resources:  Offerings in Clinic Communities  Patient Activation Measure Survey Score:  No flowsheet data found.      Assessment:   Glucose did trend up these last 2 weeks, stress may be contributing to the increase but she also had a steroid injection during this time.  She did take increase insulin dose as recommended (take 12 units the day of the injection(12/6/19) ,10 units the day after(12/7/19) the injection and resume the 8 units on day 3rd day(12/8/19).      Recommend she set her own mealtimes, watch portions and get back on track with eating, may want to consider change to medication.  Her glucose trend is higher during the day and drifts down overnight. May want to consider a change in medication. Cost is a concern to her. She previously saw [facility] and has not been back for awhile. Recommend she follow up with [facility] for review of all her medications.      Discussed utilizing resources for home situation to help with her stress.  Will forward on to the  she has worked with in the past for assistance on home life.      INTERVENTION:   Education provided today on:  AADE Self-Care Behaviors:  Monitoring: frequency of monitoring  Healthy Coping: utilize support systems, methods for coping with stress and when to seek professional counseling    Pt verbalized understanding of concepts discussed and recommendations provided today.       PLAN:  See Patient Instructions for co-developed, patient-stated behavior change goals.  AVS printed and provided to patient today. See Follow-Up section for recommended follow-up.    Tara Cornejo RN,CDE   Time Spent: 30 minutes  Encounter Type: Individual    Any diabetes medication dose changes were made via the CDE Protocol and Collaborative Practice Agreement with the patient's referring provider. A copy of this encounter was shared with the provider.

## 2019-12-18 NOTE — LETTER
12/18/2019         RE: Chasity Hodgson  [address] [facility] [id number]        Dear Colleague,    Thank you for referring your patient, Chasity Hodgson, to the [facility]. Please see a copy of my visit note below.    Diabetes Self-Management Education & Support      Diabetes Education Self-Management & Training: Follow-up and Continuous Glucose Monitor Download    Chasity Hodgson presents today for download and report review of Personal continuous glucose monitor device    Comments/concerns: overwhelmed with caring for , having a hard time taking care of own needs. Don't eat a lot but at times not eating right because working around husbands appointment times and meal time preferences.       Current Diabetes Management per Patient:  Diabetes Medication(s)     Dipeptidyl Peptidase-4 (DPP-4) Inhibitors       sitagliptin (JANUVIA) 50 MG tablet    Take 1 tablet (50 mg) by mouth daily In the morning    Insulin       insulin glargine U-300 (TOUJEO) 300 UNIT/ML (1 units dial) pen    Inject 8 Units Subcutaneous every morning          Taking Medication Assessed Today: Yes  Current Treatments: Insulin Injections  Dose schedule: pre-breakfast  Given by: Patient  Injection/Infusion sites: Abdomen  Problems taking diabetes medications regularly?: No  Diabetes medication side effects?: No  Treatment Compliance: All of the time    Most Recent A1c Result:    Lab Results   Component Value Date    A1C 6.8 09/10/2019       Continuous Glucose Monitor Interpretation     Reports:               Healthy Eating  Healthy Eating Assessed Today: No  Cultural/Anglican diet restrictions?: No  Meal planning: Avoiding sweets, Carbohydrate counting, Low salt, Smaller portions, Plate planning method  Meals include: Breakfast, Lunch, Dinner, Snacks  Breakfast: cereal or eggs and toast  Lunch: turkey and cheese wit bread/muffin  Dinner: meatloaf Or hamburger with small fries  Beverages: Water, Milk,  Diet soda  Has patient met with a dietitian in the past?: Yes    Being Active  Being Active Assessed Today: Yes  Exercise:: Currently not exercising  Barrier to exercise: Time, Physical limitation    Monitoring  Monitoring Assessed Today: Yes  Did patient bring glucose meter to appointment? : Yes  Blood Glucose Meter: FreeStyle  Home Glucose (Sugar) Monitoring: 3-4 times per day  Blood glucose trend: Fluctuating minimally    Taking Medications  Diabetes Medication(s)     Dipeptidyl Peptidase-4 (DPP-4) Inhibitors       sitagliptin (JANUVIA) 50 MG tablet    Take 1 tablet (50 mg) by mouth daily In the morning    Insulin       insulin glargine U-300 (TOUJEO) 300 UNIT/ML (1 units dial) pen    Inject 8 Units Subcutaneous every morning          Taking Medication Assessed Today: Yes  Current Treatments: Insulin Injections  Dose schedule: pre-breakfast  Given by: Patient  Injection/Infusion sites: Abdomen  Problems taking diabetes medications regularly?: No  Diabetes medication side effects?: No  Treatment Compliance: All of the time    Problem Solving  Problem Solving Assessed Today: Yes  Hypoglycemia Frequency: Other(no recent symptoms since last appt )  Patient carries a carbohydrate source: Yes  Medical alert: No    Hypoglycemia symptoms  Confusion: No  Dizziness or Light-Headedness: Yes  Headaches: No  Mood changes: No  Nervousness/Anxiety: No  Sleepiness: Yes  Speech difficulty: No  Sweats: No  Tremors: Yes    Hypoglycemia Complications  Blackouts: No  Hospitalization: No  Nocturnal hypoglycemia: No  Required assistance: No  Required glucagon injection: No  Seizures: No    Reducing Risks  Reducing Risks Assessed Today: No  Diabetes Risks: Age over 45 years, Family History, Sedentary Lifestyle  CAD Risks: Diabetes Mellitus, Family history, Obesity, Post-menopausal, Sedentary lifestyle  Has dilated eye exam at least once a year?: Yes  Sees dentist every 6 months?: Yes  Sees podiatrist (foot doctor)?: Yes    Healthy  Coping  Healthy Coping Assessed Today: No  Emotional response to diabetes: Ready to learn  Informal Support system:: Family  Stage of change: ACTION (Actively working towards change)  Difficulty affording diabetes management supplies?: No  Support resources: Offerings in Clinic Communities  Patient Activation Measure Survey Score:  No flowsheet data found.      Assessment:   Glucose did trend up these last 2 weeks, stress may be contributing to the increase but she also had a steroid injection during this time.  She did take increase insulin dose as recommended (take 12 units the day of the injection(12/6/19) ,10 units the day after(12/7/19) the injection and resume the 8 units on day 3rd day(12/8/19).      Recommend she set her own mealtimes, watch portions and get back on track with eating, may want to consider change to medication.  Her glucose trend is higher during the day and drifts down overnight. May want to consider a change in medication. Cost is a concern to her. She previously saw [facility] and has not been back for awhile. Recommend she follow up with [facility] for review of all her medications.      Discussed utilizing resources for home situation to help with her stress.  Will forward on to the  she has worked with in the past for assistance on home life.      INTERVENTION:   Education provided today on:  AADE Self-Care Behaviors:  Monitoring: frequency of monitoring  Healthy Coping: utilize support systems, methods for coping with stress and when to seek professional counseling    Pt verbalized understanding of concepts discussed and recommendations provided today.       PLAN:  See Patient Instructions for co-developed, patient-stated behavior change goals.  AVS printed and provided to patient today. See Follow-Up section for recommended follow-up.    Tara Cornejo RN,CDE   Time Spent: 30 minutes  Encounter Type: Individual    Any diabetes medication dose changes were made via the CDE Protocol and  Collaborative Practice Agreement with the patient's referring provider. A copy of this encounter was shared with the provider.

## 2019-12-19 ENCOUNTER — MYC MEDICAL ADVICE (OUTPATIENT)
Dept: EDUCATION SERVICES | Facility: CLINIC | Age: 74
End: 2019-12-19

## 2020-01-02 ENCOUNTER — PATIENT OUTREACH (OUTPATIENT)
Dept: CARE COORDINATION | Facility: CLINIC | Age: 75
End: 2020-01-02

## 2020-01-02 ASSESSMENT — ACTIVITIES OF DAILY LIVING (ADL): DEPENDENT_IADLS:: INDEPENDENT

## 2020-01-02 NOTE — PROGRESS NOTES
Clinic Care Coordination Contact    Follow Up Progress Note      Assessment: BAKARI ELENA spoke with pt regarding her overall wellbeing. Pt shared that her  fell in the garage 12/20/2019. She called 911 and he refused to go to the hospital at that time. He continued to complain of back pain and on 12/24 he went [facility] ED and it was discovered he had a fracture in L1 vertebrae. He has been in the observation unit for 6 days and they want him to go to TCU but medicare won't cover this because of his hospital status. BAKARI ELENA encouraged pt to contact Medicare to discuss this concern to see if an exception can be made due to length of hospital stay. Pt is concerned to have her  return home now due to his current leg weakness and frequent falls. Pt is speaking with the hospital  to determine options.    Pt's goal was discussed. She is considering different living situations and/or bringing in in-home care. She explained that she wants to sell her home this summer and either move into a town home where everything in on one level or an retirement. She realizes that pt's recent injury may impact what decision is made. BAKARI ELENA provided pt Disability Benefits 101 phone number to inquire about her questions regarding medicaid. Pt asked about involving a  to discuss options, BAKARI ELENA encouraged her to consider this option when she is looking to sell her home but at this time it may not assist in pt's husbands transitional situation.    Goals addressed this encounter:   Goals Addressed                 This Visit's Progress       Patient Stated      1. Psychosocial (pt-stated)   On track     Goal Statement: Within the next 3 months, Sammie, would like to enlist in in-home support for her  for his overall wellbeing.    Measure of Success: Sammie will verbally inform Care Coordination  of success.    Supportive Steps to Achieve: BAKARI ELENA will provide ongoing assistance and follow up to determine next  steps to succeed in goal    Barriers: Potential barriers to time in conversing with many community agencies    Strengths: Sammie is very motivated to secure services to increase husbands safety in the home    Date to Achieve By: 4/15/2020    Patient expressed understanding of goal: Sammie verbally stated understanding of goal    As of today's date 1/2/2020 goal is met at 0 - 25%.   Goal Status:  Ongoing: Date extended        Outreach Frequency: monthly    Plan: CC SW will continue to follow up with pt regarding goal progression. Pt was reminded of CC SW contact information and encouraged to call with questions or concerns that arise before next outreach.    Care Coordinator will follow up in 1 month    MARYAN Ng, SUDHAKAR  Clinic Care Coordinator  [facility]  [facility]  [phone]  [email or web address]

## 2020-01-02 NOTE — LETTER
[facility]  Complex Care Plan  About Me:    Patient Name:  Chasity Brooke    YOB: 1945  Age:         74 year old   [facility] MRN:    [id number] Telephone Information:  Home Phone [phone]   Mobile [phone]       Address:  [address] [address] [address] Email address:  [email or web address]      Emergency Contact(s)    Name Relationship Lgl Grd Work Phone Home Phone Mobile Phone   1. ARIE BROOKE Spouse No  [phone]   2. FRANKLIN GALLEGOS Daughter No [phone]   3. JOSE BROOKE Son No NONE [phone]           Primary language:  English     needed? No   [facility] [employer] [facility]:  [phone] op. 1  Other communication barriers: None  Preferred Method of Communication:  Carlos Alberto  Current living arrangement: I live in a private home with family  Mobility Status/ Medical Equipment: Independent    Health Maintenance  Health Maintenance Reviewed: Not assessed    My Access Plan  Medical Emergency 911   Primary Clinic Line [facility] - [phone]   24 Hour Appointment Line [phone] or  [phone]) (toll-free)   24 Hour Nurse Line [phone] (toll-free)   Preferred Urgent Care [facility], [phone]   Preferred Hospital [facility]  [phone]   Preferred Pharmacy [facility] [place], [place] - [address]     Behavioral Health Crisis Line The National Suicide Prevention Lifeline at 1-549.651.8918 or 911             My Care Team Members  Patient Care Team       Relationship Specialty Notifications Start End    Shola Benoit MD PCP - General Internal Medicine  10/22/12     Phone: [phone] Pager: [phone] Fax: [phone] [address] [place] [address]    Shola Benoit MD Assigned PCP   10/4/12     Phone: [phone] Pager: [phone] Fax: [phone]         [address] [facility] [address]    Renetta Meyer MD MD Dermatology  4/17/15     Phone: [phone] Fax: [phone]         [address] [facility] [address] [facility] [address]    ROMI Roque MD MD INTERNAL MEDICINE - ENDOCRINOLOGY, DIABETES & METABOLISM  2/23/16     Phone: [phone] Fax: [phone]         ENDOCRINE CLINIC [facility] [address] [facility] [address]    Ulises Pantoja MD MD Gastroenterology  9/12/17     Phone: [phone] Fax: [phone]         [facility] [address] [place] [address]    Kishore Ferrera MD MD Orthopedics  2/23/18     Phone: [phone] Fax: [phone]         [address] [facility] [address]    Magy Calix LGSW Lead Care Coordinator Primary Care - CC  10/7/19     Tara Cornejo RN Diabetes Educator Diabetes Education  10/9/19     Phone: [phone] Fax: [phone]                My Care Plans  Self Management and Treatment Plan  Goals and (Comments)  Goals        General    1. Psychosocial (pt-stated)     Notes - Note edited  10/17/2019 11:15 AM by Magy Calix LGSW    Goal Statement: Within the next 3 months, Sammie, would like to enlist in in-home support for her  for his overall wellbeing.    Measure of Success: Sammie will verbally inform Care Coordination  of success.    Supportive Steps to Achieve: CC SW will provide ongoing assistance and follow up to determine next steps to succeed in goal    Barriers: Potential barriers to time in conversing with many community agencies    Strengths: Sammie is very motivated to secure services to increase husbands safety in the home    Date to Achieve By: 1/15/2020    Patient expressed understanding of goal: Sammie verbally stated understanding of goal             Action Plans on File:                       Advance Care Plans/Directives Type:   Type Advanced Care Plans/Directives: Advanced Directive - On File, POLST    My  Medical and Care Information  Problem List   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)     Anemia due to blood loss, acute     ACP (advance care planning)     Postsurgical hypothyroidism     Type 2 diabetes, HbA1c goal < 7% (H)     Gastroparesis     Papillary carcinoma of thyroid (H)     Alopecia     Pulmonary nodule     Esophageal reflux     Dermatitis     Acne rosacea     Diabetic gastroparesis (H)     Poliomyelitis     Left knee pain     Carotid stenosis     Right shoulder pain     Type 2 diabetes mellitus with other specified complication (H)     Insufficiency, arterial, peripheral (H)     Allergic dermatitis due to other chemical product     Normocytic anemia     Morbid obesity (H)     Mild major depression (H)     CKD (chronic kidney disease) stage 3, GFR 30-59 ml/min (H)      Current Medications and Allergies:  See printed Medication Report.    Care Coordination Start Date: 10/7/2019   Frequency of Care Coordination: monthly   Form Last Updated: 01/02/2020

## 2020-01-06 ENCOUNTER — MYC MEDICAL ADVICE (OUTPATIENT)
Dept: EDUCATION SERVICES | Facility: CLINIC | Age: 75
End: 2020-01-06

## 2020-01-07 ENCOUNTER — PATIENT OUTREACH (OUTPATIENT)
Dept: EDUCATION SERVICES | Facility: CLINIC | Age: 75
End: 2020-01-07
Payer: MEDICARE

## 2020-01-07 NOTE — TELEPHONE ENCOUNTER
Diabetes Follow-up    Subjective/Objective:    Chasity Hodgson downloaded or sent in blood glucose report for review.    Diabetes is being managed with   Diabetes Medications   Diabetes Medication(s)     Dipeptidyl Peptidase-4 (DPP-4) Inhibitors       sitagliptin (JANUVIA) 50 MG tablet    Take 1 tablet (50 mg) by mouth daily In the morning    Insulin       insulin glargine U-300 (TOUJEO) 300 UNIT/ML (1 units dial) pen    Inject 8 Units Subcutaneous every morning          BG/Food log or report:             Assessment/Plan:  Gerard Cochran,  I am so sorry to hear you are going through all these challenges with your .      Your glucose is a little high but there are also a lot of gaps in data. I know how hard it can be to manage your health while caring for your .  Do your best to scan at least every 8 hours and continue with your current medication.  I do not want to increase your insulin due to the low glucose reading you had along with the fact that you still appear to drift down overnight.   As I previously mentioned I think we should look at some alternatives with your medication and I would like you to follow up with Maggi our [facility] pharmacist.  Her and I an do a co-visit together with you if you would prefer to meet with us both together.    I know your schedule busy so let me know what you would like to do.    Tara Cornejo RN,CDE         Any diabetes medication dose changes were made via the CDE Protocol and Collaborative Practice Agreement with the patient's referring provider. A copy of this encounter was shared with the provider.

## 2020-01-16 ENCOUNTER — HOSPITAL ENCOUNTER (EMERGENCY)
Facility: CLINIC | Age: 75
Discharge: HOME | End: 2020-01-16
Attending: EMERGENCY MEDICINE | Admitting: EMERGENCY MEDICINE
Payer: MEDICARE

## 2020-01-16 ENCOUNTER — APPOINTMENT (OUTPATIENT)
Dept: GENERAL RADIOLOGY | Facility: CLINIC | Age: 75
End: 2020-01-16
Attending: EMERGENCY MEDICINE
Payer: MEDICARE

## 2020-01-16 ENCOUNTER — PATIENT OUTREACH (OUTPATIENT)
Dept: CARE COORDINATION | Facility: CLINIC | Age: 75
End: 2020-01-16

## 2020-01-16 VITALS
TEMPERATURE: 98.8 F | HEIGHT: 60 IN | WEIGHT: 203.04 LBS | DIASTOLIC BLOOD PRESSURE: 73 MMHG | HEART RATE: 81 BPM | BODY MASS INDEX: 39.86 KG/M2 | RESPIRATION RATE: 20 BRPM | OXYGEN SATURATION: 97 % | SYSTOLIC BLOOD PRESSURE: 187 MMHG

## 2020-01-16 DIAGNOSIS — J40 BRONCHITIS: ICD-10-CM

## 2020-01-16 LAB
ANION GAP SERPL CALCULATED.3IONS-SCNC: 6 MMOL/L (ref 3–14)
BASOPHILS # BLD AUTO: 0 10E9/L (ref 0–0.2)
BASOPHILS NFR BLD AUTO: 0.6 %
BUN SERPL-MCNC: 27 MG/DL (ref 7–30)
CALCIUM SERPL-MCNC: 9.4 MG/DL (ref 8.5–10.1)
CHLORIDE SERPL-SCNC: 105 MMOL/L (ref 94–109)
CO2 SERPL-SCNC: 28 MMOL/L (ref 20–32)
CREAT SERPL-MCNC: 1.35 MG/DL (ref 0.52–1.04)
DIFFERENTIAL METHOD BLD: ABNORMAL
EOSINOPHIL # BLD AUTO: 0.1 10E9/L (ref 0–0.7)
EOSINOPHIL NFR BLD AUTO: 2 %
ERYTHROCYTE [DISTWIDTH] IN BLOOD BY AUTOMATED COUNT: 12.3 % (ref 10–15)
FLUAV+FLUBV AG SPEC QL: NEGATIVE
FLUAV+FLUBV AG SPEC QL: NEGATIVE
GFR SERPL CREATININE-BSD FRML MDRD: 39 ML/MIN/{1.73_M2}
GLUCOSE SERPL-MCNC: 142 MG/DL (ref 70–99)
HCT VFR BLD AUTO: 35.6 % (ref 35–47)
HGB BLD-MCNC: 11.3 G/DL (ref 11.7–15.7)
IMM GRANULOCYTES # BLD: 0 10E9/L (ref 0–0.4)
IMM GRANULOCYTES NFR BLD: 0.5 %
LYMPHOCYTES # BLD AUTO: 1.1 10E9/L (ref 0.8–5.3)
LYMPHOCYTES NFR BLD AUTO: 16.5 %
MCH RBC QN AUTO: 30.1 PG (ref 26.5–33)
MCHC RBC AUTO-ENTMCNC: 31.7 G/DL (ref 31.5–36.5)
MCV RBC AUTO: 95 FL (ref 78–100)
MONOCYTES # BLD AUTO: 0.8 10E9/L (ref 0–1.3)
MONOCYTES NFR BLD AUTO: 11.7 %
NEUTROPHILS # BLD AUTO: 4.6 10E9/L (ref 1.6–8.3)
NEUTROPHILS NFR BLD AUTO: 68.7 %
NRBC # BLD AUTO: 0 10*3/UL
NRBC BLD AUTO-RTO: 0 /100
PLATELET # BLD AUTO: 171 10E9/L (ref 150–450)
POTASSIUM SERPL-SCNC: 3.9 MMOL/L (ref 3.4–5.3)
RBC # BLD AUTO: 3.75 10E12/L (ref 3.8–5.2)
SODIUM SERPL-SCNC: 139 MMOL/L (ref 133–144)
SPECIMEN SOURCE: NORMAL
WBC # BLD AUTO: 6.7 10E9/L (ref 4–11)

## 2020-01-16 PROCEDURE — 94640 AIRWAY INHALATION TREATMENT: CPT

## 2020-01-16 PROCEDURE — 85025 COMPLETE CBC W/AUTO DIFF WBC: CPT | Performed by: EMERGENCY MEDICINE

## 2020-01-16 PROCEDURE — 99284 EMERGENCY DEPT VISIT MOD MDM: CPT | Mod: 25

## 2020-01-16 PROCEDURE — 87804 INFLUENZA ASSAY W/OPTIC: CPT | Mod: 59 | Performed by: EMERGENCY MEDICINE

## 2020-01-16 PROCEDURE — 80048 BASIC METABOLIC PNL TOTAL CA: CPT | Performed by: EMERGENCY MEDICINE

## 2020-01-16 PROCEDURE — 87804 INFLUENZA ASSAY W/OPTIC: CPT | Performed by: EMERGENCY MEDICINE

## 2020-01-16 PROCEDURE — 25800030 ZZH RX IP 258 OP 636: Performed by: EMERGENCY MEDICINE

## 2020-01-16 PROCEDURE — 71046 X-RAY EXAM CHEST 2 VIEWS: CPT

## 2020-01-16 PROCEDURE — 25000125 ZZHC RX 250: Performed by: EMERGENCY MEDICINE

## 2020-01-16 RX ORDER — ALBUTEROL SULFATE 0.83 MG/ML
2.5 SOLUTION RESPIRATORY (INHALATION) ONCE
Status: COMPLETED | OUTPATIENT
Start: 2020-01-16 | End: 2020-01-16

## 2020-01-16 RX ORDER — BENZONATATE 200 MG/1
200 CAPSULE ORAL 3 TIMES DAILY PRN
Qty: 21 CAPSULE | Refills: 0 | Status: SHIPPED | OUTPATIENT
Start: 2020-01-16 | End: 2020-05-18

## 2020-01-16 RX ORDER — ALBUTEROL SULFATE 90 UG/1
2-4 AEROSOL, METERED RESPIRATORY (INHALATION)
Qty: 1 INHALER | Refills: 1 | Status: SHIPPED | OUTPATIENT
Start: 2020-01-16 | End: 2022-05-13

## 2020-01-16 RX ADMIN — ALBUTEROL SULFATE 2.5 MG: 2.5 SOLUTION RESPIRATORY (INHALATION) at 17:01

## 2020-01-16 RX ADMIN — SODIUM CHLORIDE, POTASSIUM CHLORIDE, SODIUM LACTATE AND CALCIUM CHLORIDE 500 ML: 600; 310; 30; 20 INJECTION, SOLUTION INTRAVENOUS at 17:00

## 2020-01-16 ASSESSMENT — ENCOUNTER SYMPTOMS
FEVER: 0
SHORTNESS OF BREATH: 1
COUGH: 1
FATIGUE: 1
WHEEZING: 1
RHINORRHEA: 1

## 2020-01-16 ASSESSMENT — MIFFLIN-ST. JEOR: SCORE: 1347.5

## 2020-01-16 NOTE — PROGRESS NOTES
[facility] Care Coordination Contact  Care Team Conversations    BAKARI ELENA received a call from Juli ELENA with [facility]. Juli shared her concern for pt and her . Pt was found today on the ground after several hours. She fell and due to left hip and left shoulder issues, she was unable to get herself up. She did not sustain any injuries from this fall. Pt's  was found on the toilet, where he had been for several hours. It is unclear why he was stuck there. Pt's son lives in the home as well but he was unaware of these events happening. He is not able to assist to get pt or  up.    Juli is very concerned about pt's and her husbands safety in the home. She is strongly recommending TCU placement for both pt and her . BAKARI ELENA discussed barrier to this, including needed PCP appointment and payment. Juli stated that she will work with pt to complete [place] and [place] for [place] Cho[place]. Juli stated that she would need to be accepted in TCU as status MA Pending.     Plan: BAKARI ELENA will look into appointment for a provider in clinic and will outreach to TCU's regarding openings.     MARYAN Ng, [facility]  Clinic Care Coordinator  [facility]  [phone]  [email or web address]

## 2020-01-16 NOTE — ED PROVIDER NOTES
History     Chief Complaint:  Cough    HPI  Chasity Hodgson is a 74 year old female who presents today with a cough. The patient states that she first started getting a cough from her  4-5 days ago that has been worsening. She states associated fatigue, shortness of breath, decreased appetite and coughing fits, especially at night. She states some phlegm and rhinorrhea today that is thick, white and mucousy. She states she feels herself wheezing. She has not used breathing treatments at home. She is unsure of a fever. She denies history of asthma or COPD. Of note, she states she can take care of herself at home.     Allergies:  Nsaids  Toradol  Celecoxib  Codeine  Crestor [Rosuvastatin]  No Clinical Screening - See Comments  Vioxx  Conjugated Estrogens  Sulfa Drugs    Medications:    Amlodipine (Norvasc)   Aspirin (Sb Low Dose Asa Ec)   Atenolol (Tenormin)   Azelastine Hcl (Astepro Na)  Biotin  Cephalexin (Keflex)  Cholecalciferol (Vitamin D-3 Po)  Clotrimazole (Lotrimin)   Diphenhydramine-Acetaminophen (Tylenol Pm)   Ezetimibe (Zetia)  Famotidine (Pepcid)   Fenofibrate (Tricor)   Ferrous Sulfate (Iron)  Fexofenadine (Allegra)   Fluticasone (Flonase)   Furosemide (Lasix)   Gabapentin (Neurontin)   Insulin Glargine U-300 (Toujeo)   Levothyroxine (Synthroid)  Lisinopril (Prinivil/Zestril)   Metoclopramide (Reglan)   Nitroglycerin (Nitrostat)  Ondansetron (Zofran)   Pantoprazole (Protonix)  Sitagliptin (Januvia)   Triamcinolone (Kenalog)     Past Medical History:    Abdominal adhesions   Allergic rhinitis  Carotid stenosis   CPAP (continuous positive airway pressure) dependence   Diabetic gastroparesis  Diet-controlled type 2 diabetes mellitus  DVT of axillary vein, acute right   Fibromyalgia   Gastro-oesophageal reflux disease   Hernia of unspecified site of abdominal cavity without mention of obstruction or gangrene   HTN (hypertension)   Hypertriglyceridemia   Obstructive sleep apnea   ANNETTE (obstructive sleep  apnea)   Papillary carcinoma of thyroid   PE (pulmonary embolism)   Poliomyelitis   Postsurgical hypothyroidism   Pulmonary embolism   Rosacea    Septic joint   Venous insufficiency   Venous thrombosis   CKD    Past Surgical History:    Carpal tunnel release   Hardware removal   Shoulder surgery  Appendectomy  Cholecystectomy   Colostomy  Total hysterectomy  GI surgery    Family History:    Mother - arthritis, hypertension, cerebrovascular disease, obesity, MI  Father - hypertension, RDS, diabetes  Sister - arthritis, cancer, hypertension, lipids, obesity  Brother - lipids, hypertension    Social History:  The patient was accompanied to the ED with her  and son-in-law.  Smoking Status: Never  Smokeless Tobacco: Never  Alcohol Use: No   Drug Use: No   PCP: Shola Benoit   Marital Status:     Review of Systems   Constitutional: Positive for fatigue. Negative for fever.   HENT: Positive for rhinorrhea.    Respiratory: Positive for cough, shortness of breath and wheezing.    All other systems reviewed and are negative.         Physical Exam     Patient Vitals for the past 24 hrs:   BP Temp Temp src Heart Rate Resp Height Weight   01/16/20 1454 (!) 170/93 -- -- -- -- -- --   01/16/20 1452 -- 98.8  F (37.1  C) Oral 74 20 1.524 m (5') 92.1 kg (203 lb 0.7 oz)       Physical Exam  Gen: well appearing, in no acute distress  HEENT:  mmm, no rhinorrhea  Neck: supple, no abnormal swelling  Lungs: Mild tachypnea, slightly prolonged expiratory phase, no localizing rhonchi rales or wheezing  CV: rrr, no m/r/g, ppi  Abd: soft, nontender, nondistended, no rebound/masses/guarding/hsm  Ext: no peripheral edema  Skin: warm, dry, well perfused, no rashes/bruising/lesions on exposed skin  Neuro: alert, MAEE, no gross motor or sensory deficits, gait stable  Psych: Normal mood, normal affect      Emergency Department Course     Imaging:  Radiology results were communicated with the patient who voiced understanding of  the findings.    XR Chest 2 views:   IMPRESSION: There are no acute infiltrates. The cardiac silhouette is  not enlarged. Pulmonary vasculature is unremarkable, as per radiology.    Laboratory:  Laboratory results were communicated with the patient who voiced understanding of the findings.    CBC: WBC: 6.7, HGB: 11.3 (L), PLT: 171  BMP: Glucose: 142 (H), Creatinine: 1.35 (H), GFR: 39 (L), o/w WNL      Influenza A/B Antigen: A/B negative    Interventions:  1517 Lactated Bolus 500 mLs IV  1517 Albuterol 2.5 mg neb    Emergency Department Course:  Nursing notes and vitals reviewed. (3:01 PM) I performed an exam of the patient as documented above.     IV inserted, blood and Influenza swab sent to the lab for further testing, results above.     Medicine administered as documented above.    The patient was sent for XR while in the emergency department, results above.     1658 I rechecked the patient and discussed the results of their workup thus far.     Findings and plan explained to the Patient. Patient discharged home with instructions regarding supportive care, medications, and reasons to return. The importance of close follow-up was reviewed. The patient was prescribed as below.    Impression & Plan      Medical Decision Makin 4-year-old female here with her  who recently had respiratory tract infectious symptoms.  She likely has influenza illness versus bronchitis versus pneumonia.  Rapid flu screen negative here basic blood test unremarkable no significant respiratory distress or hypoxia.  I radiograph negative for lobar pneumonia.  Discharged home supportive cares for his likely viral induced bronchitis.  She is comfortable and agreeable to plan.      Diagnosis:    ICD-10-CM    1. Bronchitis J40      Disposition:  Discharged to home.    Discharge Medications:  New Prescriptions    ALBUTEROL (PROAIR HFA/PROVENTIL HFA/VENTOLIN HFA) 108 (90 BASE) MCG/ACT INHALER    Inhale 2-4 puffs into the lungs every 2  hours as needed for shortness of breath / dyspnea    BENZONATATE (TESSALON) 200 MG CAPSULE    Take 1 capsule (200 mg) by mouth 3 times daily as needed for cough     Scribe Disclosure:  I, Hernandez Adhikari, am serving as a scribe at 3:01 PM on 1/16/2020 to document services personally performed by Jose Francisco Mckeon, based on my observations and the provider's statements to me.      1/16/2020   [facility] EMERGENCY DEPARTMENT       Jose Francisco Mckeon MD  01/16/20 9080

## 2020-01-16 NOTE — PROGRESS NOTES
Clinic Care Coordination Contact  Care Team Conversations    CC SW spoke with Juli regarding an appointment available with Dr. Spivey at 12:30. Pt is now refusing TCU admit and therefore doesn't need this appointment. Juli plans to continue assisting pt's  with TCU admit, he is a pt at [facility]. She will outreach to the CC SW there regarding this.     Pt's safety was discussed, pt and son were given [facility] crisis number and directed to call 911 for further falls.     Plan: CC SW will outreach to pt in 1-2 days to further inquire about safety and long-term plan, including [place] Cho[place], LIBERT[place], and lifeline.     MARYAN Ng, [facility]  Clinic Care Coordinator  [facility]  [phone]  [email or web address]

## 2020-01-16 NOTE — ED AVS SNAPSHOT
[facility] Emergency Department  [address]  [facility] [address]  Phone:  [phone]  Fax:  [phone]                                    Chasity Hodgson   MRN: [id number]    Department:  [facility] Emergency Department   Date of Visit:  1/16/2020           After Visit Summary Signature Page    I have received my discharge instructions, and my questions have been answered. I have discussed any challenges I see with this plan with the nurse or doctor.    ..........................................................................................................................................  Patient/Patient Representative Signature      ..........................................................................................................................................  Patient Representative Print Name and Relationship to Patient    ..................................................               ................................................  Date                                   Time    ..........................................................................................................................................  Reviewed by Signature/Title    ...................................................              ..............................................  Date                                               Time          22EPIC Rev 08/18

## 2020-01-21 ENCOUNTER — OFFICE VISIT (OUTPATIENT)
Dept: FAMILY MEDICINE | Facility: CLINIC | Age: 75
End: 2020-01-21
Payer: MEDICARE

## 2020-01-21 ENCOUNTER — PATIENT OUTREACH (OUTPATIENT)
Dept: CARE COORDINATION | Facility: CLINIC | Age: 75
End: 2020-01-21

## 2020-01-21 VITALS
SYSTOLIC BLOOD PRESSURE: 128 MMHG | WEIGHT: 203.2 LBS | HEIGHT: 60 IN | HEART RATE: 59 BPM | DIASTOLIC BLOOD PRESSURE: 59 MMHG | BODY MASS INDEX: 39.89 KG/M2 | OXYGEN SATURATION: 99 % | TEMPERATURE: 97.4 F

## 2020-01-21 DIAGNOSIS — J20.9 ACUTE BRONCHITIS WITH SYMPTOMS > 10 DAYS: Primary | ICD-10-CM

## 2020-01-21 DIAGNOSIS — J01.90 ACUTE SINUSITIS, RECURRENCE NOT SPECIFIED, UNSPECIFIED LOCATION: ICD-10-CM

## 2020-01-21 PROCEDURE — 99213 OFFICE O/P EST LOW 20 MIN: CPT | Performed by: NURSE PRACTITIONER

## 2020-01-21 RX ORDER — AZITHROMYCIN 250 MG/1
TABLET, FILM COATED ORAL
Qty: 6 TABLET | Refills: 0 | Status: SHIPPED | OUTPATIENT
Start: 2020-01-21 | End: 2020-05-18

## 2020-01-21 ASSESSMENT — MIFFLIN-ST. JEOR: SCORE: 1348.21

## 2020-01-21 NOTE — PATIENT INSTRUCTIONS
Begin taking plain mucinex 1200mg twice a day  Drink a lot of water  Take the antibiotic as prescribed

## 2020-01-21 NOTE — PROGRESS NOTES
Subjective     Chasity Hodgson is a 74 year old female who presents to clinic today for the following health issues:    HPI   ED/UC Followup:    Facility:  [facility]  Date of visit: 01/16/2020  Reason for visit:     Bronchitis      Current Status: patient states shes feeling a little bit better, cough is better, and no longer has any chilling,  Does have sinus congestion and drainage that is thick and discolored and feeling very fatigued.  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)     Anemia due to blood loss, acute     ACP (advance care planning)     Postsurgical hypothyroidism     Type 2 diabetes, HbA1c goal < 7% (H)     Gastroparesis     Papillary carcinoma of thyroid (H)     Alopecia     Pulmonary nodule     Esophageal reflux     Dermatitis     Acne rosacea     Diabetic gastroparesis (H)     Poliomyelitis     Left knee pain     Carotid stenosis     Right shoulder pain     Type 2 diabetes mellitus with other specified complication (H)     Insufficiency, arterial, peripheral (H)     Allergic dermatitis due to other chemical product     Normocytic anemia     Morbid obesity (H)     Mild major depression (H)     CKD (chronic kidney disease) stage 3, GFR 30-59 ml/min (H)     Past Surgical History:   Procedure Laterality Date     AMPUTATE TOE(S)  3/15/2012    Procedure:AMPUTATE TOE(S); Surgeon:RUBEN MOSES; Location:[facility] OR     APPENDECTOMY  1972     ARTHRODESIS FOOT  3/15/2012    Procedure:ARTHRODESIS FOOT; RIGHT TRIPLE ARTHRODESIS, FIFTH TOE AMPUTATION, LATERAL LIGAMENT RECONSTRUCTION, TENDON TRANSFER AND RELEASE [MINI C-ARM, ARTHREX  4.5 AND 6.7 FLIP, BIOCOMPOSITE TENODESIS SCREWS]; Surgeon:SUKHDEEP METZ; Location: OR     C FREEING BOWEL ADHESION,ENTEROLYSIS      1986, 1996, 1999     C NONSPECIFIC PROCEDURE      throidectomy     C TOTAL ABDOM HYSTERECTOMY  1980    + BSO     CHOLECYSTECTOMY       COLOSTOMY  2/7/2012    Procedure:COLOSTOMY; CREATION OF SIGMOID COLOSTOMY AND EXTENSIVE  LYSIS OF ADHESIONS; Surgeon:MONTSERRAT BENDER; Location: OR     GI SURGERY      weakened rectal sphincter with artificial stimulator     LAPAROTOMY, LYSIS ADHESIONS, COMBINED  2/7/2012    Procedure:COMBINED LAPAROTOMY, LYSIS ADHESIONS; Surgeon:MONTSERRAT BENDER; Location: OR     RELEASE TENDON FOOT  3/15/2012    Procedure:RELEASE TENDON FOOT; Surgeon:SUKHDEEP METZ; Location: OR     REMOVE HARDWARE FOOT  12/13/2012    Procedure: REMOVE HARDWARE FOOT;  RIGHT FOOT REMOVAL OF HARDWARE;  Surgeon: Sukhdeep Metz MD;  Location:  OR       Social History     Tobacco Use     Smoking status: Never Smoker     Smokeless tobacco: Never Used   Substance Use Topics     Alcohol use: Not Currently     Alcohol/week: 0.0 standard drinks     Family History   Problem Relation Age of Onset     Arthritis Mother      Hypertension Mother      Cerebrovascular Disease Mother      Obesity Mother      Heart Disease Mother         MI's     Hypertension Father      Respiratory Father         Adult RDS     Diabetes Father         adult     Arthritis Sister      Cancer Sister      Arthritis Sister      Hypertension Sister      Cancer Sister         colon polup     Heart Disease Brother         MI at 54     Hypertension Sister      Lipids Brother      Hypertension Brother      Lipids Sister      Obesity Sister      Obesity Maternal Grandmother      Skin Cancer Maternal Grandmother         skin cancer unknown     Cancer Maternal Grandmother         unknown skin cancer on face         Current Outpatient Medications   Medication Sig Dispense Refill      albuterol (PROAIR HFA/PROVENTIL HFA/VENTOLIN HFA) 108 (90 Base) MCG/ACT inhaler Inhale 2-4 puffs into the lungs every 2 hours as needed for shortness of breath / dyspnea 1 Inhaler 1     amLODIPine (NORVASC) 2.5 MG tablet Take 1 tablet (2.5 mg) by mouth 2 times daily 180 tablet 3     aspirin (SB LOW DOSE ASA EC) 81 MG EC tablet Take 81 mg by mouth daily       atenolol (TENORMIN) 50 MG tablet Take 1 tablet (50 mg) by mouth 2 times daily 180 tablet 3     Azelastine HCl (ASTEPRO NA)        azithromycin (ZITHROMAX) 250 MG tablet Two tablets first day, then one tablet daily for four days. 6 tablet 0     benzonatate (TESSALON) 200 MG capsule Take 1 capsule (200 mg) by mouth 3 times daily as needed for cough 21 capsule 0     BIOTIN PO Take 1,000 mcg by mouth       Cholecalciferol (VITAMIN D-3 PO) Take 2 tablets by mouth       clotrimazole (LOTRIMIN) 1 % cream Apply topically daily       Continuous Glucose Monitor KIT 1 each every 14 days 1 kit 25     diphenhydrAMINE-acetaminophen (TYLENOL PM)  MG tablet Take 1 tablet by mouth At Bedtime Reported on 3/20/2017       ezetimibe (ZETIA) 10 MG tablet Take 1 tablet (10 mg) by mouth daily 90 tablet 3     famotidine (PEPCID) 40 MG tablet Take 40 mg by mouth as needed        fenofibrate (TRICOR) 145 MG tablet Take 1 tablet (145 mg) by mouth daily 90 tablet 3     ferrous sulfate (IRON) 325 (65 FE) MG tablet Take 325 mg by mouth daily (with breakfast)       fexofenadine (ALLEGRA) 180 MG tablet Take 180 mg by mouth daily. 120 0     fluticasone (FLONASE) 50 MCG/ACT spray Spray 2 sprays in nostril daily 2 sprays in each nostril qd 1 Bottle 0     furosemide (LASIX) 20 MG tablet Take 1 tablet (20 mg) by mouth daily And Take additional 20 mg in the afternoon M,W,F 135 tablet 3     gabapentin (NEURONTIN) 100 MG capsule Take 1 capsule (100 mg) by mouth every evening as needed 90 capsule 3     insulin glargine U-300 (TOUJEO) 300 UNIT/ML (1 units dial) pen Inject 8 Units Subcutaneous every  morning 15 mL 1     levothyroxine (SYNTHROID) 112 MCG tablet Take 112 mcg by mouth daily Take 112 mcg daily except for Friday takes only 56 mcg.  Brand name Synthroid       lisinopril (PRINIVIL/ZESTRIL) 40 MG tablet Take 1 tablet (40 mg) by mouth daily 90 tablet 3     metoclopramide (REGLAN) 5 MG tablet Take 1-2 tablets (5-10 mg) by mouth 2 times daily as needed a 120 tablet 0     MULTIVITAMINS OR TABS ONE DAILY 100 3     nitroGLYcerin (NITROSTAT) 0.4 MG sublingual tablet Place 1 tablet (0.4 mg) under the tongue every 5 minutes as needed for chest pain (no more than 3 in one hour; after 3rd, call 911.) 25 tablet 3     nystatin (MYCOSTATIN) cream Apply topically daily as needed        nystatin-triamcinolone (MYCOLOG II) cream Apply topically daily as needed       ondansetron (ZOFRAN) 4 MG tablet Take 1 tablet by mouth every 6 hours as needed Reported on 3/20/2017       order for DME Equipment being ordered: Compression stockings - Knee High; 20-30 mmHg compression - note would like adhesive band to keep the stocking from sliding down 3 each 0     order for DME Donut Pillow 1 each 0     order for DME Equipment being ordered: Oral appliance for sleep apnea 1 Units 0     pantoprazole (PROTONIX) 40 MG EC tablet Take 40 mg by mouth 2 times daily       sitagliptin (JANUVIA) 50 MG tablet Take 1 tablet (50 mg) by mouth daily In the morning       Allergies   Allergen Reactions     Nsaids Difficulty breathing     Increased creatinine     Toradol Difficulty breathing     Shortness of breath     Celecoxib Itching and Rash     Codeine Itching     With higher doses     Crestor [Rosuvastatin] Muscle Pain (Myalgia)     No Clinical Screening - See Comments Itching     Fragrance     Vioxx Other (See Comments)     Heart races     Conjugated Estrogens Rash     Sulfa Drugs Rash       Reviewed and updated as needed this visit by Provider       Review of Systems   ROS COMP: Constitutional, HEENT, cardiovascular, pulmonary, gi and gu  systems are negative, except as otherwise noted.      Objective    /59 (BP Location: Right arm, Patient Position: Sitting, Cuff Size: Adult Large)   Pulse 59   Temp 97.4  F (36.3  C) (Oral)   Ht 1.524 m (5')   Wt 92.2 kg (203 lb 3.2 oz)   SpO2 99%   BMI 39.68 kg/m      Physical Exam   GENERAL: healthy, alert and no distress  EYES: Eyes grossly normal to inspection, PERRL and conjunctivae and sclerae normal  HENT: ear canals and TM's normal, nose and mouth without ulcers or lesions  NECK: no adenopathy, no asymmetry, masses, or scars and thyroid normal to palpation  RESP: lungs clear to auscultation - no rales, rhonchi or wheezes  CV: regular rate and rhythm, normal S1 S2, no S3 or S4, no murmur, click or rub, no peripheral edema and peripheral pulses strong    Diagnostic Test Results:  Labs reviewed in Epic        Assessment & Plan       ICD-10-CM    1. Acute bronchitis with symptoms > 10 days J20.9 azithromycin (ZITHROMAX) 250 MG tablet   2. Acute sinusitis, recurrence not specified, unspecified location J01.90      Patient Instructions   Begin taking plain mucinex 1200mg twice a day  Drink a lot of water  Take the antibiotic as prescribed      [facility]

## 2020-01-21 NOTE — PROGRESS NOTES
Clinic Care Coordination Contact  [facility]/Voicemail       Clinical Data: Care Coordinator Outreach    Outreach attempted x 1.  Left message on patient's voicemail with call back information and requested return call.    Plan: Care Coordinator will try to reach patient again in 3-5 business days.    MARYAN Ng, [facility]  Clinic Care Coordinator  [facility]  [phone]  [email or web address]

## 2020-01-21 NOTE — LETTER
[facility]  Complex Care Plan  About Me:    Patient Name:  Chasity Brooke    YOB: 1945  Age:         74 year old   [place] MRN:    [id number] Telephone Information:  Home Phone [phone]   Mobile [phone]       Address:  [address] [address] Email address:  [email or web address]      Emergency Contact(s)    Name Relationship Lgl Grd Work Phone Home Phone Mobile Phone   1. ARIE BROOKE Spouse No  [phone]   2. FRANKLIN GALLEGOS Daughter No [phone]   3. JOSE BROOKE Son No NONE [phone]        My Access Plan  Medical Emergency 911   Primary Clinic Line [facility] - [phone]   24 Hour Appointment Line [phone] or  [phone]) (toll-free)   24 Hour Nurse Line [phone] (toll-free)   Preferred Urgent Care [facility], [phone]   Preferred Hospital [facility]  [phone]   Preferred Pharmacy [facility] [place], [place] - [address]     Behavioral Health Crisis Line The National Suicide Prevention Lifeline at 1-946.836.8089 or 911       My Care Team Members  Patient Care Team       Relationship Specialty Notifications Start End    Shola Benoit MD PCP - General Internal Medicine  10/22/12     Phone: [phone] Pager: [phone] Fax: [phone] [address] [place] [address]    Shola Benoit MD Assigned PCP   10/4/12     Phone: [phone] Pager: [phone] Fax: [phone] [address] [place] [address]    Renetta Meyer MD MD Dermatology  4/17/15     Phone: [phone] Fax: [phone]         [address] [place] [address]    ROMI Roque MD MD INTERNAL MEDICINE - ENDOCRINOLOGY, DIABETES & METABOLISM  2/23/16     Phone: [phone] Fax: [phone]         ENDOCRINE CLINIC \[facility] [place] [facility]\"" [address] [facility] [address]    Ulises Pantoja MD MD Gastroenterology  9/12/17     Phone: [phone] Fax: [phone]         [facility] [address] [facility] [address]    Kishore Ferrera MD MD Orthopedics  2/23/18     Phone: [phone] Fax: [phone]         [address] [facility] [address]    Magy Calix LGSW Lead Care Coordinator Primary Care - CC  10/7/19     Tara Cornejo RN Diabetes Educator Diabetes Education  10/9/19     Phone: [phone] Fax: [phone]                My Care Plans  Self Management and Treatment Plan  Goals and (Comments)  Goals        General    1. Psychosocial (pt-stated)     Notes - Note edited  1/30/2020  1:37 PM by Magy Calix LGSW    Goal Statement: Within the next 3 months, Sammie, would like to enlist in in-home support for her  for his overall wellbeing.    Measure of Success: Sammie will verbally inform Care Coordination  of success.    Supportive Steps to Achieve: CC SW will provide ongoing assistance and follow up to determine next steps to succeed in goal    Barriers: Potential barriers to time in conversing with many community agencies    Strengths: Sammie is very motivated to secure services to increase husbands safety in the home    Date to Achieve By: 4/15/2020    Patient expressed understanding of goal: Sammie verbally stated understanding of goal             Advance Care Plans/Directives Type:   Type Advanced Care Plans/Directives: Advanced Directive - On File, POLST    My Medical and Care Information  Problem List   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)     Anemia due to blood loss, acute     ACP (advance care planning)      Postsurgical hypothyroidism     Type 2 diabetes, HbA1c goal < 7% (H)     Gastroparesis     Papillary carcinoma of thyroid (H)     Alopecia     Pulmonary nodule     Esophageal reflux     Dermatitis     Acne rosacea     Diabetic gastroparesis (H)     Poliomyelitis     Left knee pain     Carotid stenosis     Right shoulder pain     Type 2 diabetes mellitus with other specified complication (H)     Insufficiency, arterial, peripheral (H)     Allergic dermatitis due to other chemical product     Normocytic anemia     Morbid obesity (H)     Mild major depression (H)     CKD (chronic kidney disease) stage 3, GFR 30-59 ml/min (H)      Current Medications and Allergies:  See printed Medication Report.    Care Coordination Start Date: 10/7/2019   Frequency of Care Coordination: monthly   Form Last Updated: 01/30/2020

## 2020-01-22 ENCOUNTER — MYC MEDICAL ADVICE (OUTPATIENT)
Dept: EDUCATION SERVICES | Facility: CLINIC | Age: 75
End: 2020-01-22

## 2020-01-29 ENCOUNTER — TELEPHONE (OUTPATIENT)
Dept: FAMILY MEDICINE | Facility: CLINIC | Age: 75
End: 2020-01-29

## 2020-01-29 NOTE — TELEPHONE ENCOUNTER
"States on right side, below bra strap, feels a soreness. WORSE with movement. Not severe. Does not interfere with sleep.  Does not interfere with ADL's  (although she is \"lazier and fatigued\" due to her recent uri)  Continues to have coughing; not productive. Manageable.  Drinking fluids, appetite okay; mainly eats breakfast and evening meal.    No new or worsening of symptoms.    Continue Tylenol as directed, warm packs prn, Mucinex bid.  If pain changes/worsens/spreads OR if fever, breathing concerns or other, needs appt.    She agrees.    Jessica Sanedrs RN on 1/29/2020 at 1:43 PM    "

## 2020-01-29 NOTE — TELEPHONE ENCOUNTER
Reason for call:  Patient reporting a symptom    Symptom or request: coughing, severe flank pain     Duration (how long have symptoms been present): flank pain 1 day     Have you been treated for this before? Yes    Additional comments: patient has had bronchitis for 2 weeks but yesterday she had a very sharp flank pain      Phone Number patient can be reached at:  Cell number on file:    Telephone Information:   Mobile [phone]       Best Time:  Any     Can we leave a detailed message on this number:  YES    Call taken on 1/29/2020 at 9:47 AM by Jemma Short

## 2020-01-30 ASSESSMENT — ACTIVITIES OF DAILY LIVING (ADL): DEPENDENT_IADLS:: INDEPENDENT

## 2020-01-30 NOTE — PROGRESS NOTES
Clinic Care Coordination Contact    Follow Up Progress Note      Assessment: CC ULICES spoke with pt regarding her overall wellbeing. Pt shared that she is going to sell her house in the next 4 weeks or so. She is currently going through all of her belongings with the help of her family and neighbors. She is planning on moving into a condo but she doesn't have one [place] yet[place]     Next week pt is renting a dumpster to start throwing things away. She will having an estate sale in 3 weeks. She will be getting the house painted and clean carpets prior to putting it up for sale in 4 weeks.      Her  is at [facility]. They are talking about moving into an [facility]. He is unable to transfer from wheelchair due to inability to walk. He is wanting to return home but pt understands that she is not able to care for her  anymore due to his high risk of falling.    Today is pt's 50th wedding anniversary and she will be going to celebrate at the TCU with her  and family.    Goals addressed this encounter:   Goals Addressed                 This Visit's Progress       Patient Stated      1. Psychosocial (pt-stated)   On track     Goal Statement: Within the next 3 months, Sammie, would like to enlist in in-home support for her  for his overall wellbeing.    Measure of Success: Sammie will verbally inform Care Coordination  of success.    Supportive Steps to Achieve: BAKARI ELENA will provide ongoing assistance and follow up to determine next steps to succeed in goal    Barriers: Potential barriers to time in conversing with many community agencies    Strengths: Sammie is very motivated to secure services to increase husbands safety in the home    Date to Achieve By: 4/15/2020    Patient expressed understanding of goal: Sammie verbally stated understanding of goal    As of today's date 1/30/2020 goal is met at 0 - 25%.   Goal Status:  Ongoing         Outreach Frequency: monthly    Plan: CC SW will continue to follow up with pt regarding goal progression. Pt was reminded of CC SW contact information and encouraged to call with questions or concerns that arise before next outreach.    Care Coordinator will follow up in 1 month    MARYAN Ng, [facility]  Clinic Care Coordinator  [facility]  [phone]  [email or web address]

## 2020-01-31 DIAGNOSIS — L64.9 ANDROGENETIC ALOPECIA: Primary | ICD-10-CM

## 2020-01-31 RX ORDER — MINOXIDIL 2.5 MG/1
TABLET ORAL
Qty: 60 TABLET | Refills: 1 | Status: SHIPPED | OUTPATIENT
Start: 2020-01-31 | End: 2020-05-08

## 2020-01-31 NOTE — PROGRESS NOTES
Approval provided by Ms. Hodgson's primary care provider, Dr. Shola Benoit.  Script sent for minoxidil 2.5 mg with instructions to take one half tablet daily.     Renetta Meyer MD

## 2020-02-04 DIAGNOSIS — A80.9 POLIOMYELITIS: ICD-10-CM

## 2020-02-06 RX ORDER — GABAPENTIN 100 MG/1
100 CAPSULE ORAL
Qty: 90 CAPSULE | Refills: 3 | Status: SHIPPED | OUTPATIENT
Start: 2020-02-06 | End: 2021-02-17

## 2020-02-06 NOTE — TELEPHONE ENCOUNTER
gabapentin (NEURONTIN) 100 MG capsule    Last Written Prescription Date:  02/06/2019  Last Fill Quantity: 90,  # refills: 3   Last office visit: 1/21/2020 with prescribing provider:  Rufina Verduzco Office Visit:  Unknown     Requested Prescriptions   Pending Prescriptions Disp Refills     gabapentin (NEURONTIN) 100 MG capsule 90 capsule 3     Sig: Take 1 capsule (100 mg) by mouth every evening as needed       There is no refill protocol information for this order

## 2020-02-06 NOTE — TELEPHONE ENCOUNTER
Routing refill request to provider for review/approval because:  Drug not on the [facility] refill protocol     Please note # refills requested. Update if needed.   Also--Rx requires PRN reason added to Rx.      Sendy HAYS RN,BSN

## 2020-03-09 ENCOUNTER — PATIENT OUTREACH (OUTPATIENT)
Dept: CARE COORDINATION | Facility: CLINIC | Age: 75
End: 2020-03-09

## 2020-03-09 SDOH — SOCIAL STABILITY: SOCIAL INSECURITY: WITHIN THE LAST YEAR, HAVE YOU BEEN HUMILIATED OR EMOTIONALLY ABUSED IN OTHER WAYS BY YOUR PARTNER OR EX-PARTNER?: NO

## 2020-03-09 SDOH — SOCIAL STABILITY: SOCIAL INSECURITY
WITHIN THE LAST YEAR, HAVE YOU BEEN KICKED, HIT, SLAPPED, OR OTHERWISE PHYSICALLY HURT BY YOUR PARTNER OR EX-PARTNER?: NO

## 2020-03-09 SDOH — SOCIAL STABILITY: SOCIAL INSECURITY
WITHIN THE LAST YEAR, HAVE TO BEEN RAPED OR FORCED TO HAVE ANY KIND OF SEXUAL ACTIVITY BY YOUR PARTNER OR EX-PARTNER?: NO

## 2020-03-09 SDOH — SOCIAL STABILITY: SOCIAL INSECURITY: WITHIN THE LAST YEAR, HAVE YOU BEEN AFRAID OF YOUR PARTNER OR EX-PARTNER?: NO

## 2020-03-09 NOTE — PROGRESS NOTES
Clinic Care Coordination Contact    Follow Up Progress Note      Assessment: BAKARI ELENA spoke with pt regarding her overall wellbeing. Pt shared that she has been working hard to get the house up for sale. She had the house painted and had an estate sale in which many things were sold. She will be replacing and cleaning some carpeting and wants to have someone one come in to do a deep clean of the house. She is hoping to put up for sale in a couple weeks.     She is looking to move into a townhouse with at least 2 bedroom and 2 bathroom on one level. She will be looking at one this week in [place].    Pt's , Mason, is still at [facility]. He will be going to a nusing home and the  is helping to find a place but they are all full. He has been added to waiting-lists and he will remain at [facility] until there is an opening. Pt still would like to have him move back home with her but she seems to be understanding that it just isn't possible with his frequent falling.    Goals addressed this encounter:   Goals Addressed                 This Visit's Progress       Patient Stated      COMPLETED: 1. Psychosocial (pt-stated)   On track     Goal Statement: Within the next 3 months, Sammie, would like to enlist in in-home support for her  for his overall wellbeing.    Measure of Success: Sammie will verbally inform Care Coordination  of success.    Supportive Steps to Achieve: BAKARI ELENA will provide ongoing assistance and follow up to determine next steps to succeed in goal    Barriers: Potential barriers to time in conversing with many community agencies    Strengths: Sammie is very motivated to secure services to increase husbands safety in the home    Date to Achieve By: 4/15/2020    Patient expressed understanding of goal: Sammie verbally stated understanding of goal        Plan: BAKARI ELENA will continue to follow up with pt regarding goal maintenance. Pt was reminded of BAKARI ELENA contact information and  encouraged to call with questions or concerns that arise before next outreach.    Care Coordinator will follow up in 6 weeks    MARYAN Ng, SUDHAKAR  Clinic Care Coordinator  [facility]  [phone]  [email or web address]

## 2020-03-15 ENCOUNTER — HEALTH MAINTENANCE LETTER (OUTPATIENT)
Age: 75
End: 2020-03-15

## 2020-04-30 ENCOUNTER — TELEPHONE (OUTPATIENT)
Dept: DERMATOLOGY | Facility: CLINIC | Age: 75
End: 2020-04-30

## 2020-04-30 NOTE — TELEPHONE ENCOUNTER
"Called patient to offer a teledermatology visit and they consented to it. When I asked for photos uploaded via [employer], they broke down in tears stating that they have no one to take the pictures.    I relayed the following information during the call:    Upcoming Dermatology Visit Information       Due to the coronavirus pandemic, we are limiting all our non life threatening appointments to either telephone or video visits    These photos will be seen by an MD or KOKI and will need to be taken with 72 hours of your visit    Photos that you send in do need to include: date and time taken, area of body, and physical address (ex. Home address, for insurance purposes)    This will be billed to you and your insurance.    A teledermatology visit is not as thorough as an in-person visit and the quality of the photograph sent may not be of the same quality as that taken by the dermatology clinic.    You may receive a call from the clinic to review additional history and you may still be instructed to come to clinic even after photo review and be billed for both visits.    Please, seek emergency care if you have skin pain, fever, or sores in the mouth, nose, eyes, or genitals by dialing 911 or visiting an emergency room    Final photo assessment can take up to approximately 72 hours    Please, contact your clinic if you have not heard from us within 72 hours of your appointment time    Video visit requires: smart phone ( have to download \"AW Touchpoint\" irish. Or Via Computer/Laptop that has a microphone, camera, and speaker (Doctor will send link via e-mail)    For video visits, to receive an invitation and connect with your provider, the [facility] video visit technology must be accessible from your smartphone or personal device. Please click the link below for setup instructions:  ?  [email or web address]    Either option would require a nurse to call you approximately 30 min before your appointment to ask questions like " medications, allergies, and pharmacy.         Who should I call with questions?    [facility], [place]: [phone]    If this is a medical emergency and you are unable to reach an ER, Call 911      Syl Gray, EMT

## 2020-05-01 ENCOUNTER — PATIENT OUTREACH (OUTPATIENT)
Dept: CARE COORDINATION | Facility: CLINIC | Age: 75
End: 2020-05-01

## 2020-05-01 NOTE — PROGRESS NOTES
Clinic Care Coordination Contact    Assessment: Care Coordinator contacted patient for 2 month follow up.  Patient has continued to follow the plan of care and assessment is negative for any new needs or concerns.    CC SW spoke with pt regarding her overall wellbeing. Pt discussed difficulty with not being about to maintain appointments due to COVID-19. She is planning for left hip surgery. She was told by her surgeon that it is not really considered elective due to the severity of her need.    Pt's house is on the market, but no offers at this time. She is hoping to move into a new home before her hip surgery.     Pt's  is still living at [facility] and was planning to move to [facility] there today but yesterday pt received a call that 2 pt's had been diagnosed with COVID-19 and this may change his date to move.    Pt's  is currently using a wheelchair at all times but he had a fall recently. After this he complained of shoulder pain.  feel because was believing that he was returning home and was packing up his belongings. He is displaying a lot of confusion. She has been unable to visit since mid-march due to COVID-19 but they talk frequently on the phone.    Discussed COVID-19 precautions. Wears a face mask when she goes out. Keeps social distance from people. She continues to go shopping at [employer] for groceries. She is handwashing frequently.    Enrollment status: Graduated.      Plan: No further outreaches at this time.  Patient will continue to follow the plan of care.  If new needs arise a new Care Coordination referral may be placed.     MARYAN Ng, [facility]  Clinic Care Coordinator  [facility]  [phone]  [email or web address]

## 2020-05-08 ENCOUNTER — VIRTUAL VISIT (OUTPATIENT)
Dept: DERMATOLOGY | Facility: CLINIC | Age: 75
End: 2020-05-08
Payer: MEDICARE

## 2020-05-08 ENCOUNTER — TELEPHONE (OUTPATIENT)
Dept: DERMATOLOGY | Facility: CLINIC | Age: 75
End: 2020-05-08

## 2020-05-08 DIAGNOSIS — L64.9 ANDROGENETIC ALOPECIA: ICD-10-CM

## 2020-05-08 DIAGNOSIS — L23.5 ALLERGIC DERMATITIS DUE TO OTHER CHEMICAL PRODUCT: Primary | ICD-10-CM

## 2020-05-08 RX ORDER — MINOXIDIL 2.5 MG/1
TABLET ORAL
Qty: 60 TABLET | Refills: 1 | Status: SHIPPED | OUTPATIENT
Start: 2020-05-08 | End: 2021-05-03

## 2020-05-08 NOTE — PROGRESS NOTES
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 Tinea corporis or candida/yeast infection  -  nystatin powder, OTC lotrimin cream   3. Rosacea  - Vanicream  4. Venous stasis dermatitis  - Vanicream  5. Allergic contact dermatitis  - uses fragrance-free products  - patch testing revealed mild reaction  to Balsam of [place], fragrance mix, and a strong reaction to paraphenylenediamine.     Patient opted to conduct today's return visit via telephone vs an in person visit to the clinic.    Encounter Date: May 8, 2020    Chief complaint:   Chief Complaint   Patient presents with     Derm Problem     Sammie is following up on hair loss and feels things are worse at this time.     I spoke with: Chasity Hodgson    The reason for the telephone visit was:   Follow-up    Pertinent history and review of systems:  # Female pattern hair loss and dry scalp: No longer having dry scalp. Hair loss has been getting worse. Has not been able to color or cut hair hair recently, more gray hair. Feels that she can see more of her scalp. Diffuse hair loss. No scalp burning, itching or pain. Using Free & Clear shampoo and conditioner. The top and the sides of the scalp are the worst. Using handheld laser comb 2-3 times a week and also recently bought a new laser hair band that came with a \"hair growth spray.\"        # Tinea corporis or candida/yeast infection: Resolved but now has a new similar rash under the L breast. Treating with antifungal cream, improving with nystatin cream.     # Rosacea: Using vanicream, happy with results.     # Venous stasis dermatitis: Using vanicream, happy with results.     In the process of selling her home and moving. Lives with her son. Has upcoming hip replacement surgery, has not been scheduled yet due to COVID. "     Assessment/Advice/instructions given to patient/guardian including prescriptions, follow up appointment or orders for diagnostic testin. Female pattern hair loss  Per patient, progressing. Using laser comb 2-3 times a week. Dry scalp symptoms resolved.    - continue 1/2 tab of minoxidil 2.5 mg daily, refilled today  - reviewed side effects in detail - should have labs and vital signs checked   - continue laser comb up to 3x weekly  - continue free and clear shampoo  - do not use hair spray as it may contain potential allergens  - will hold off topical minoxidil for now especially given history of allergic contact dermatitis and upcoming surgery     2. Tinea corporis or candida/yeast infection, L inframammary fold  R inframammary fold infection resolved.   - continue nystatin powder and OTC lotrimin cream   - advised patient to keep area dry, especially after showering (can use a hair dryer to blow dry skin folds)      3. Rosacea  - continue vanicream    4. Venous stasis dermatitis  - continue vanicream    Follow-up 4 months, after surgery.     Phone call contact time:  Call Started at: 10:05AM  Call Ended at: 10:25AM    Staff and resident:    Dr. Meyer was present during the entirety of this encounter.     Tory Cagle MD (PGY-2)  Dermatology Resident      I agree with the history, review of systems, assessments and plan as outlined by the dermatology resident.  I was present for the entire call with the patient and Dr. Cagle.    Renetta Meyer MD  Professor and  Chair  Department of Dermatology  [facility]

## 2020-05-08 NOTE — TELEPHONE ENCOUNTER
[facility] Call Center    Phone Message    May a detailed message be left on voicemail: yes     Reason for Call: Other: Pt calling back about Appt that was scheduled for Oct she said was to be 4 months please call back to discuss , 12:45-2:30 going to be gone  Please call back to discuss  Thank you,    Action Taken: Message routed to:  [facility] ([facility] derm    Travel Screening: Not Applicable

## 2020-05-08 NOTE — LETTER
"5/8/2020       RE: Chasity Hodgson  [address] [facility] [address]     Dear Colleague,    Thank you for referring your patient, Chasity Hodgson, to the [facility] DERMATOLOGY at [facility]. Please see a copy of my visit note below.    Dermatology Phone Visit    Dermatology Problem List:  1. Female pattern hair loss  - 1/2 tab of minoxidil 2.5 mg daily, laser comb up to 3x weekly and free and clear shampoo  - dry scalp resolved with olive oil and OTC hydrocortisone  2. Tinea corporis or candida/yeast infection  -  nystatin powder, OTC lotrimin cream   3. Rosacea  - Vanicream  4. Venous stasis dermatitis  - Vanicream  5. Allergic contact dermatitis  - uses fragrance-free products  - patch testing revealed mild reaction  to Balsam of [place], fragrance mix, and a strong reaction to paraphenylenediamine.     Patient opted to conduct today's return visit via telephone vs an in person visit to the clinic.    Encounter Date: May 8, 2020    Chief complaint:   Chief Complaint   Patient presents with     Derm Problem     Sammie is following up on hair loss and feels things are worse at this time.     I spoke with: Chasity Hodgson    The reason for the telephone visit was:   Follow-up    Pertinent history and review of systems:  # Female pattern hair loss and dry scalp: No longer having dry scalp. Hair loss has been getting worse. Has not been able to color or cut hair hair recently, more gray hair. Feels that she can see more of her scalp. Diffuse hair loss. No scalp burning, itching or pain. Using Free & Clear shampoo and conditioner. The top and the sides of the scalp are the worst. Using handheld laser comb 2-3 times a week and also recently bought a new laser hair band that came with a \"hair growth spray.\"        # Tinea corporis or candida/yeast infection: Resolved but now has a new similar rash under the L breast. Treating with antifungal cream, improving with nystatin cream.     # " Rosacea: Using vanicream, happy with results.     # Venous stasis dermatitis: Using vanicream, happy with results.     In the process of selling her home and moving. Lives with her son. Has upcoming hip replacement surgery, has not been scheduled yet due to COVID.     Assessment/Advice/instructions given to patient/guardian including prescriptions, follow up appointment or orders for diagnostic testin. Female pattern hair loss  Per patient, progressing. Using laser comb 2-3 times a week. Dry scalp symptoms resolved.    - continue 1/2 tab of minoxidil 2.5 mg daily, refilled today  - reviewed side effects in detail - should have labs and vital signs checked   - continue laser comb up to 3x weekly  - continue free and clear shampoo  - do not use hair spray as it may contain potential allergens  - will hold off topical minoxidil for now especially given history of allergic contact dermatitis and upcoming surgery     2. Tinea corporis or candida/yeast infection, L inframammary fold  R inframammary fold infection resolved.   - continue nystatin powder and OTC lotrimin cream   - advised patient to keep area dry, especially after showering (can use a hair dryer to blow dry skin folds)      3. Rosacea  - continue vanicream    4. Venous stasis dermatitis  - continue vanicream    Follow-up 4 months, after surgery.     Phone call contact time:  Call Started at: 10:05AM  Call Ended at: 10:25AM    Staff and resident:    Dr. Meyer was present during the entirety of this encounter.     Tory Cagle MD (PGY-2)  Dermatology Resident      I agree with the history, review of systems, assessments and plan as outlined by the dermatology resident.  I was present for the entire call with the patient and Dr. Cagle.    Renetta Meyer MD  Professor and  Chair  Department of Dermatology  [facility]             Patient opted to conduct today's return visit via telephone vs an in person visit to the clinic.    Encounter  Date: May 8, 2020    Chief complaint:   Chief Complaint   Patient presents with     Derm Problem     Sammie is following up on hair loss and feels things are worse at this time.            Again, thank you for allowing me to participate in the care of your patient.      Sincerely,    Renetta Meyer MD

## 2020-05-08 NOTE — TELEPHONE ENCOUNTER
Pt unable to talk at this time. Needs 4 month follow up with Dr Meyer. Please assist with scheduling.

## 2020-05-08 NOTE — PATIENT INSTRUCTIONS
[facility] Teledermatology Visit    Thank you for allowing us to participate in your care. Your findings, instructions and follow-up plan are as follows:    Hair loss:  - continue laser comb 3x/week   - can continue taking minoxidil 2.5 mg daily (1/2 tab) but make sure your doctors know about this!  - do not use hair spray   - keep skin folds dry with hair dryer  - apply antifungal creams until rash goes away and then can use it a few times a week to prevent rash from coming back     When should I call my doctor?    If you are worsening or not improving, please, contact us or seek urgent care as noted below.     Who should I call with questions (adults)?    [facility] (adult and pediatric): [phone]     [facility] (adult): [phone]    For urgent needs outside of business hours call the [facility] at [phone] and ask for the dermatology resident on call    If this is a medical emergency and you are unable to reach an ER, Call [phone]

## 2020-05-08 NOTE — PROGRESS NOTES
Patient opted to conduct today's return visit via telephone vs an in person visit to the clinic.    Encounter Date: May 8, 2020    Chief complaint:   Chief Complaint   Patient presents with     Derm Problem     Sammie is following up on hair loss and feels things are worse at this time.

## 2020-05-18 ENCOUNTER — OFFICE VISIT (OUTPATIENT)
Dept: FAMILY MEDICINE | Facility: CLINIC | Age: 75
End: 2020-05-18
Payer: MEDICARE

## 2020-05-18 VITALS
BODY MASS INDEX: 39.09 KG/M2 | OXYGEN SATURATION: 100 % | HEART RATE: 54 BPM | WEIGHT: 199.1 LBS | DIASTOLIC BLOOD PRESSURE: 78 MMHG | SYSTOLIC BLOOD PRESSURE: 176 MMHG | HEIGHT: 60 IN | TEMPERATURE: 97.8 F

## 2020-05-18 DIAGNOSIS — Z79.4 TYPE 2 DIABETES MELLITUS WITH OTHER SPECIFIED COMPLICATION, WITH LONG-TERM CURRENT USE OF INSULIN (H): ICD-10-CM

## 2020-05-18 DIAGNOSIS — I73.9 INSUFFICIENCY, ARTERIAL, PERIPHERAL (H): ICD-10-CM

## 2020-05-18 DIAGNOSIS — I10 BENIGN ESSENTIAL HYPERTENSION: ICD-10-CM

## 2020-05-18 DIAGNOSIS — Z93.3 COLOSTOMY IN PLACE (H): ICD-10-CM

## 2020-05-18 DIAGNOSIS — Z01.818 PREOP GENERAL PHYSICAL EXAM: Primary | ICD-10-CM

## 2020-05-18 DIAGNOSIS — E11.69 TYPE 2 DIABETES MELLITUS WITH OTHER SPECIFIED COMPLICATION, WITH LONG-TERM CURRENT USE OF INSULIN (H): ICD-10-CM

## 2020-05-18 DIAGNOSIS — G47.33 OSA (OBSTRUCTIVE SLEEP APNEA): ICD-10-CM

## 2020-05-18 DIAGNOSIS — E89.0 POSTSURGICAL HYPOTHYROIDISM: ICD-10-CM

## 2020-05-18 DIAGNOSIS — N18.30 CKD (CHRONIC KIDNEY DISEASE) STAGE 3, GFR 30-59 ML/MIN (H): ICD-10-CM

## 2020-05-18 DIAGNOSIS — E66.01 MORBID OBESITY (H): ICD-10-CM

## 2020-05-18 DIAGNOSIS — Z13.6 CARDIOVASCULAR SCREENING; LDL GOAL LESS THAN 130: ICD-10-CM

## 2020-05-18 DIAGNOSIS — L64.9 ANDROGENETIC ALOPECIA: ICD-10-CM

## 2020-05-18 DIAGNOSIS — F32.0 MILD MAJOR DEPRESSION (H): ICD-10-CM

## 2020-05-18 LAB
BASOPHILS # BLD AUTO: 0 10E9/L (ref 0–0.2)
BASOPHILS NFR BLD AUTO: 0.4 %
DIFFERENTIAL METHOD BLD: ABNORMAL
EOSINOPHIL # BLD AUTO: 0.2 10E9/L (ref 0–0.7)
EOSINOPHIL NFR BLD AUTO: 2.9 %
ERYTHROCYTE [DISTWIDTH] IN BLOOD BY AUTOMATED COUNT: 12.3 % (ref 10–15)
HCT VFR BLD AUTO: 34.7 % (ref 35–47)
HGB BLD-MCNC: 11.3 G/DL (ref 11.7–15.7)
LYMPHOCYTES # BLD AUTO: 2.2 10E9/L (ref 0.8–5.3)
LYMPHOCYTES NFR BLD AUTO: 27.5 %
MCH RBC QN AUTO: 30.8 PG (ref 26.5–33)
MCHC RBC AUTO-ENTMCNC: 32.6 G/DL (ref 31.5–36.5)
MCV RBC AUTO: 95 FL (ref 78–100)
MONOCYTES # BLD AUTO: 0.6 10E9/L (ref 0–1.3)
MONOCYTES NFR BLD AUTO: 8.2 %
NEUTROPHILS # BLD AUTO: 4.8 10E9/L (ref 1.6–8.3)
NEUTROPHILS NFR BLD AUTO: 61 %
PLATELET # BLD AUTO: 217 10E9/L (ref 150–450)
RBC # BLD AUTO: 3.67 10E12/L (ref 3.8–5.2)
WBC # BLD AUTO: 7.9 10E9/L (ref 4–11)

## 2020-05-18 PROCEDURE — 93000 ELECTROCARDIOGRAM COMPLETE: CPT | Performed by: INTERNAL MEDICINE

## 2020-05-18 PROCEDURE — 80048 BASIC METABOLIC PNL TOTAL CA: CPT | Performed by: INTERNAL MEDICINE

## 2020-05-18 PROCEDURE — 36415 COLL VENOUS BLD VENIPUNCTURE: CPT | Performed by: INTERNAL MEDICINE

## 2020-05-18 PROCEDURE — 99215 OFFICE O/P EST HI 40 MIN: CPT | Performed by: INTERNAL MEDICINE

## 2020-05-18 PROCEDURE — 85025 COMPLETE CBC W/AUTO DIFF WBC: CPT | Performed by: INTERNAL MEDICINE

## 2020-05-18 ASSESSMENT — MIFFLIN-ST. JEOR: SCORE: 1329.61

## 2020-05-18 NOTE — PATIENT INSTRUCTIONS
Before Your Surgery      Call your surgeon if there is any change in your health. This includes signs of a cold or flu (such as a sore throat, runny nose, cough, rash or fever).    Do not smoke, drink alcohol or take over the counter medicine (unless your surgeon or primary care doctor tells you to) for the 24 hours before and after surgery.    If you take prescribed drugs: Follow your doctor s orders about which medicines to take and which to stop until after surgery.    Eating and drinking prior to surgery: follow the instructions from your surgeon    Take a shower or bath the night before surgery. Use the soap your surgeon gave you to gently clean your skin. If you do not have soap from your surgeon, use your regular soap. Do not shave or scrub the surgery site.  Wear clean pajamas and have clean sheets on your bed.     (Z01.818) Preop general physical exam  (primary encounter diagnosis)  Comment: You are medically optimized for your upcoming surgery pending labs and EKG.  We will plan to hold lisinopril on day of surgery.  Hold aspirin for 1 week leading up to surgery.  With regards to insulin dosing, less plan to take half of your usual dose of Lantus on the morning of surgery and monitor closely  Plan: **Basic metabolic panel FUTURE anytime, CBC         with platelets and differential, EKG 12-lead         complete w/read - Clinics            (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)  Comment: NO issues  Plan:     (F32.0) Mild major depression (H)  Comment: No acute concerns at this time  Plan:     (E11.69,  Z79.4) Type 2 diabetes mellitus with other specified complication, with long-term current use of insulin (H)  Comment: For diabetic management, we will plan to continue insulin.  Currently taking insulin glargine 8 units in the morning.  Will take only half, which would be 4 units, in the morning on the day of surgery  Plan:     (I73.9) Insufficiency, arterial, peripheral (H)  Comment: Continue to monitor  Plan:      (E66.01) Morbid obesity (H)  Comment: No acute concerns.  This will be monitored during surgery and postoperatively  Plan:     (N18.3) CKD (chronic kidney disease) stage 3, GFR 30-59 ml/min (H)  Comment: We will recheck renal function today  Plan:     (G47.33) ANNETTE (obstructive sleep apnea)  Comment: Continue use of CPAP  Plan:     (E89.0) Postsurgical hypothyroidism  Comment: Recheck thyroid with endocrinology  Plan:     (I10) Benign essential hypertension  Comment: Blood pressure is extremely elevated today.  Will need to recheck before you leave  Plan:

## 2020-05-18 NOTE — PROGRESS NOTES
[address] [address]  [phone]  Dept: [phone]    PRE-OP EVALUATION:  Today's date: 2020    Chasity Hodgson (: 1945) presents for pre-operative evaluation assessment as requested by Dr. Henderson, Nik Yin MD .  She requires evaluation and anesthesia risk assessment prior to undergoing surgery/procedure for treatment of LEFT TOTAL HIP ARTHROPLASTY .    Proposed Surgery/ Procedure: LEFT TOTAL HIP ARTHROPLASTY  Date of Surgery/ Procedure: 220  Time of Surgery/ Procedure: [facility]  Hospital/Surgical Facility: [facility]    Fax number for surgical facility: [phone]  Primary Physician: Shola Benoit  Type of Anesthesia Anticipated: to be determined    Patient has a Health Care Directive or Living Will:  NO    1. NO - Do you have a history of heart attack, stroke, stent, bypass or surgery on an artery in the head, neck, heart or legs?  2. YES - Do you ever have any pain or discomfort in your chest?  3. NO - Do you have a history of  Heart Failure?  4. NO - Are you troubled by shortness of breath when: walking on the level, up a slight hill or at night?  5. NO - Do you currently have a cold, bronchitis or other respiratory infection?  6. NO - Do you have a cough, shortness of breath or wheezing?  7. NO - Do you sometimes get pains in the calves of your legs when you walk?  8. YES - Do you or anyone in your family have previous history of blood clots?  9. NO - Do you or does anyone in your family have a serious bleeding problem such as prolonged bleeding following surgeries or cuts?  10. YES - Have you ever had problems with anemia or been told to take iron pills?  11. NO - Have you had any abnormal blood loss such as black, tarry or bloody stools, or abnormal vaginal bleeding?  12. YES - Have you ever had a blood transfusion?  13. NO - Have you or any of your relatives ever had problems with anesthesia?  14. NO  - Do you have sleep apnea, excessive snoring or daytime drowsiness?  15. NO - Do you have any prosthetic heart valves?  16. NO - Do you have prosthetic joints?  17. NO - Is there any chance that you may be pregnant?      HPI:     HPI related to upcoming procedure: degenerative joint disease Hip      See problem list for active medical problems.  Problems all longstanding and stable, except as noted/documented.  See ROS for pertinent symptoms related to these conditions.      MEDICAL HISTORY:     Patient Active Problem List    Diagnosis Date Noted     CKD (chronic kidney disease) stage 3, GFR 30-59 ml/min (H) 04/17/2019     Priority: Medium     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     Priority: Medium     Morbid obesity (H) 08/01/2018     Priority: Medium     Normocytic anemia 01/16/2017     Priority: Medium     Allergic dermatitis due to other chemical product 10/02/2016     Priority: Medium     Insufficiency, arterial, peripheral (H) 11/08/2015     Priority: Medium     Mild seen on CARMITA 11/2015 - right sided       Type 2 diabetes mellitus with other specified complication (H) 10/18/2015     Priority: Medium     Carotid stenosis 12/09/2014     Priority: Medium     Mild increased stenosis 50-69% left ICA       Right shoulder pain 12/09/2014     Priority: Medium     Left knee pain 11/03/2014     Priority: Medium     Poliomyelitis      Priority: Medium     poor circulation right leg       Diabetic gastroparesis (H)      Priority: Medium     Dermatitis 02/15/2014     Priority: Medium     Acne rosacea 02/15/2014     Priority: Medium     Esophageal reflux 01/23/2014     Priority: Medium     Had upper endoscopy - Dr. Pantoja 2013       Pulmonary nodule 09/06/2013     Priority: Medium     Alopecia 02/09/2013     Priority: Medium     Problem list name updated by automated process. Provider to review       Papillary carcinoma of thyroid (H)      Priority: Medium     s/p thyroidectomy - Ruegemer       Gastroparesis 11/12/2012      Priority: Medium     Postsurgical hypothyroidism      Priority: Medium     s/p papillary thryoid cancer - Marj  Concern for possible recurrence 03/2014       Type 2 diabetes, HbA1c goal < 7% (H)      Priority: Medium     ACP (advance care planning) 09/21/2012     Priority: Medium     Discussed advance care planning with patient; information given to patient to review. 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 Provider to review       ANNETTE (obstructive sleep apnea)      Priority: Medium     Mixed hyperlipidemia 10/31/2010     Priority: Medium     Low back pain 07/15/2009     Priority: Medium      Past Medical History:   Diagnosis Date     Abdominal adhesions 1984, 96,99    s/p lysis     Allergic rhinitis, cause unspecified      Carotid stenosis      CPAP (continuous positive airway pressure) dependence      Diabetic gastroparesis (H)      Diet-controlled type 2 diabetes mellitus (H)      DVT of axillary vein, acute right (H)      Fibromyalgia      Gastro-oesophageal reflux disease      Hernia of unspecified site of abdominal cavity without mention of obstruction or gangrene     bilateral     HTN (hypertension)      Hypertriglyceridemia      Obstructive sleep apnea      ANNETTE (obstructive sleep apnea)      Papillary carcinoma of thyroid (H)     s/p thyroidectomy - Ruegemer     PE (pulmonary embolism)      Poliomyelitis     poor circulation right leg     Postsurgical hypothyroidism     s/p papillary thryoid cancer - Ruegemer      Pulmonary embolism (H)      Rosacea      S/P carpal tunnel release     bilateral     S/P hardware removal 01/2014    still with lingering foot pain     S/P shoulder surgery     bilateral     Septic joint (H)     right knee     Venous insufficiency      Venous thrombosis 1999    right axillary vein     Past Surgical History:   Procedure Laterality Date     AMPUTATE TOE(S)  3/15/2012    Procedure:AMPUTATE TOE(S); Surgeon:SUKHDEEP METZ; Location: OR     APPENDECTOMY  1972     ARTHRODESIS FOOT  3/15/2012    Procedure:ARTHRODESIS FOOT; RIGHT TRIPLE ARTHRODESIS, FIFTH TOE AMPUTATION, LATERAL LIGAMENT RECONSTRUCTION, TENDON TRANSFER AND RELEASE [MINI C-ARM, ARTHREX 4.5 AND 6.7 STAPLES, BIOCOMPOSITE TENODESIS SCREWS]; Surgeon:SUKHDEEP METZ; Location: OR      FREEING BOWEL ADHESION,ENTEROLYSIS      1986, 1996, 1999     C NONSPECIFIC PROCEDURE      throidectomy     C TOTAL ABDOM HYSTERECTOMY  1980    + BSO     CHOLECYSTECTOMY       COLOSTOMY  2/7/2012    Procedure:COLOSTOMY; CREATION OF SIGMOID COLOSTOMY AND EXTENSIVE  LYSIS OF ADHESIONS; Surgeon:MONTSERRAT BENDER; Location: OR     GI SURGERY      weakened rectal sphincter with artificial stimulator     LAPAROTOMY, LYSIS ADHESIONS, COMBINED  2/7/2012    Procedure:COMBINED LAPAROTOMY, LYSIS ADHESIONS; Surgeon:MONTSERRAT BENDER; Location: OR     RELEASE TENDON FOOT  3/15/2012    Procedure:RELEASE TENDON FOOT; Surgeon:SUKHDEEP METZ; Location: OR     REMOVE HARDWARE FOOT  12/13/2012    Procedure: REMOVE HARDWARE FOOT;  RIGHT FOOT REMOVAL OF HARDWARE;  Surgeon: Sukhdeep Metz MD;  Location:  OR     Current Outpatient Medications   Medication Sig Dispense Refill     albuterol (PROAIR HFA/PROVENTIL HFA/VENTOLIN HFA) 108 (90 Base) MCG/ACT inhaler Inhale 2-4 puffs into the lungs every 2 hours as needed for shortness of breath / dyspnea 1 Inhaler 1     amLODIPine (NORVASC) 2.5 MG tablet Take 1 tablet (2.5 mg) by mouth 2 times  daily 180 tablet 3     aspirin (SB LOW DOSE ASA EC) 81 MG EC tablet Take 81 mg by mouth daily       atenolol (TENORMIN) 50 MG tablet Take 1 tablet (50 mg) by mouth 2 times daily 180 tablet 3     Azelastine HCl (ASTEPRO NA)        azithromycin (ZITHROMAX) 250 MG tablet Two tablets first day, then one tablet daily for four days. 6 tablet 0     BIOTIN PO Take 1,000 mcg by mouth       Cholecalciferol (VITAMIN D-3 PO) Take 2 tablets by mouth       clotrimazole (LOTRIMIN) 1 % cream Apply topically daily       Continuous Glucose Monitor KIT 1 each every 14 days 1 kit 25     diphenhydrAMINE-acetaminophen (TYLENOL PM)  MG tablet Take 1 tablet by mouth At Bedtime Reported on 3/20/2017       ezetimibe (ZETIA) 10 MG tablet Take 1 tablet (10 mg) by mouth daily 90 tablet 3     famotidine (PEPCID) 40 MG tablet Take 40 mg by mouth as needed        fenofibrate (TRICOR) 145 MG tablet Take 1 tablet (145 mg) by mouth daily 90 tablet 3     ferrous sulfate (IRON) 325 (65 FE) MG tablet Take 325 mg by mouth daily (with breakfast)       fexofenadine (ALLEGRA) 180 MG tablet Take 180 mg by mouth daily. 120 0     fluticasone (FLONASE) 50 MCG/ACT spray Spray 2 sprays in nostril daily 2 sprays in each nostril qd 1 Bottle 0     furosemide (LASIX) 20 MG tablet Take 1 tablet (20 mg) by mouth daily And Take additional 20 mg in the afternoon M,W,F 135 tablet 3     gabapentin (NEURONTIN) 100 MG capsule Take 1 capsule (100 mg) by mouth every evening as needed 90 capsule 3     insulin glargine U-300 (TOUJEO) 300 UNIT/ML (1 units dial) pen Inject 8 Units Subcutaneous every morning 15 mL 1     levothyroxine (SYNTHROID) 112 MCG tablet Take 112 mcg by mouth daily Take 112 mcg daily except for Friday takes only 56 mcg.  Brand name Synthroid       lisinopril (PRINIVIL/ZESTRIL) 40 MG tablet Take 1 tablet (40 mg) by mouth daily 90 tablet 3     metoclopramide (REGLAN) 5 MG tablet Take 1-2 tablets (5-10 mg) by mouth 2 times daily as needed a 120 tablet 0      minoxidil (LONITEN) 2.5 MG tablet One half tablet daily 60 tablet 1     MULTIVITAMINS OR TABS ONE DAILY 100 3     nitroGLYcerin (NITROSTAT) 0.4 MG sublingual tablet Place 1 tablet (0.4 mg) under the tongue every 5 minutes as needed for chest pain (no more than 3 in one hour; after 3rd, call 911.) 25 tablet 3     nystatin (MYCOSTATIN) cream Apply topically daily as needed        nystatin-triamcinolone (MYCOLOG II) cream Apply topically daily as needed       ondansetron (ZOFRAN) 4 MG tablet Take 1 tablet by mouth every 6 hours as needed Reported on 3/20/2017       order for DME Equipment being ordered: Compression stockings - Knee High; 20-30 mmHg compression - note would like adhesive band to keep the stocking from sliding down 3 each 0     order for DME Donut Pillow 1 each 0     order for DME Equipment being ordered: Oral appliance for sleep apnea 1 Units 0     pantoprazole (PROTONIX) 40 MG EC tablet Take 40 mg by mouth 2 times daily       sitagliptin (JANUVIA) 50 MG tablet Take 1 tablet (50 mg) by mouth daily In the morning       OTC products: None, except as noted above    Allergies   Allergen Reactions     Nsaids Difficulty breathing     Increased creatinine     Toradol Difficulty breathing     Shortness of breath     Celecoxib Itching and Rash     Codeine Itching     With higher doses     Crestor [Rosuvastatin] Muscle Pain (Myalgia)     No Clinical Screening - See Comments Itching     Fragrance     Vioxx Other (See Comments)     Heart races     Conjugated Estrogens Rash     Sulfa Drugs Rash      Latex Allergy: NO    Social History     Tobacco Use     Smoking status: Never Smoker     Smokeless tobacco: Never Used   Substance Use Topics     Alcohol use: Not Currently     Alcohol/week: 0.0 standard drinks     History   Drug Use No       REVIEW OF SYSTEMS:   Constitutional, neuro, ENT, endocrine, pulmonary, cardiac, gastrointestinal, genitourinary, musculoskeletal, integument and psychiatric systems are  negative, except as otherwise noted.    EXAM:   BP (!) 176/78   Pulse 54   Temp 97.8  F (36.6  C) (Tympanic)   Ht 1.524 m (5')   Wt 90.3 kg (199 lb 1.6 oz)   SpO2 100%   BMI 38.88 kg/m      GENERAL APPEARANCE: healthy, alert and no distress     EYES: EOMI, PERRL     HENT: ear canals and TM's normal and nose and mouth without ulcers or lesions     NECK: no adenopathy, no asymmetry, masses, or scars and thyroid normal to palpation     RESP: lungs clear to auscultation - no rales, rhonchi or wheezes     CV: regular rates and rhythm, normal S1 S2, no S3 or S4 and no murmur, click or rub. 2+ edema     ABDOMEN:  soft, nontender, no HSM or masses and bowel sounds normal     MS: limited mobility of left shoulder and left hip     SKIN: no suspicious lesions or rashes     NEURO: Normal strength and tone, sensory exam grossly normal, mentation intact and speech normal     PSYCH: mentation appears normal. and affect normal/bright     LYMPHATICS: No cervical adenopathy    DIAGNOSTICS:     EKG: sinus bradycardia, normal axis, normal intervals, no acute ST/T changes c/w ischemia, no LVH by voltage criteria, unchanged from previous tracings  Labs Resulted Today:   Results for orders placed or performed in visit on 05/18/20   **Basic metabolic panel FUTURE anytime     Status: Abnormal   Result Value Ref Range    Sodium 140 133 - 144 mmol/L    Potassium 4.4 3.4 - 5.3 mmol/L    Chloride 106 94 - 109 mmol/L    Carbon Dioxide 25 20 - 32 mmol/L    Anion Gap 9 3 - 14 mmol/L    Glucose 123 (H) 70 - 99 mg/dL    Urea Nitrogen 19 7 - 30 mg/dL    Creatinine 1.07 (H) 0.52 - 1.04 mg/dL    GFR Estimate 51 (L) >60 mL/min/[1.73_m2]    GFR Estimate If Black 59 (L) >60 mL/min/[1.73_m2]    Calcium 9.2 8.5 - 10.1 mg/dL   CBC with platelets and differential     Status: Abnormal   Result Value Ref Range    WBC 7.9 4.0 - 11.0 10e9/L    RBC Count 3.67 (L) 3.8 - 5.2 10e12/L    Hemoglobin 11.3 (L) 11.7 - 15.7 g/dL    Hematocrit 34.7 (L) 35.0 - 47.0 %     MCV 95 78 - 100 fl    MCH 30.8 26.5 - 33.0 pg    MCHC 32.6 31.5 - 36.5 g/dL    RDW 12.3 10.0 - 15.0 %    Platelet Count 217 150 - 450 10e9/L    % Neutrophils 61.0 %    % Lymphocytes 27.5 %    % Monocytes 8.2 %    % Eosinophils 2.9 %    % Basophils 0.4 %    Absolute Neutrophil 4.8 1.6 - 8.3 10e9/L    Absolute Lymphocytes 2.2 0.8 - 5.3 10e9/L    Absolute Monocytes 0.6 0.0 - 1.3 10e9/L    Absolute Eosinophils 0.2 0.0 - 0.7 10e9/L    Absolute Basophils 0.0 0.0 - 0.2 10e9/L    Diff Method Automated Method      Labs Drawn and in Process:   Unresulted Labs Ordered in the Past 30 Days of this Admission     No orders found from 4/18/2020 to 5/19/2020.          Recent Labs   Lab Test 01/16/20  1549 11/04/19  1348  09/10/19  1202 08/05/19  2117  03/29/19  1355  02/23/18  0625  11/18/13  1258   HGB 11.3*  --   --   --  12.4   < >  --    < > 11.2*   < >  --      --   --   --  200   < >  --    < > 233   < >  --    INR  --   --   --   --   --   --   --   --  0.98  --  2.00*    140   < > 138 139   < > 139   < > 134   < >  --    POTASSIUM 3.9 5.3   < > 4.9 5.0   < > 4.7   < > 4.0   < >  --    CR 1.35* 1.25*   < > 1.49* 1.03   < > 1.20*   < > 1.29*   < >  --    A1C  --   --   --  6.8*  --   --  8.8*   < >  --    < >  --     < > = values in this interval not displayed.        IMPRESSION:   Reason for surgery/procedure: degenerative joint disease hip   Diagnosis/reason for consult: Pre-operative Evaluation    The proposed surgical procedure is considered INTERMEDIATE risk.    REVISED CARDIAC RISK INDEX  The patient has the following serious cardiovascular risks for perioperative complications such as (MI, PE, VFib and 3  AV Block):  No serious cardiac risks  INTERPRETATION: 0 risks: Class I (very low risk - 0.4% complication rate)    The patient has the following additional risks for perioperative complications:  No identified additional risks      ICD-10-CM    1. Preop general physical exam  Z01.818         RECOMMENDATIONS:   (Z01.818) Preop general physical exam  (primary encounter diagnosis)  Comment: You are medically optimized for your upcoming surgery pending labs and EKG.  We will plan to hold lisinopril on day of surgery.  Hold aspirin for 1 week leading up to surgery.  With regards to insulin dosing, less plan to take half of your usual dose of Lantus on the morning of surgery and monitor closely  Plan: **Basic metabolic panel FUTURE anytime, CBC         with platelets and differential, EKG 12-lead         complete w/read - Clinics            (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)  Comment: NO issues  Plan:     (F32.0) Mild major depression (H)  Comment: No acute concerns at this time  Plan:     (E11.69,  Z79.4) Type 2 diabetes mellitus with other specified complication, with long-term current use of insulin (H)  Comment: For diabetic management, we will plan to continue insulin.  Currently taking insulin glargine 8 units in the morning.  Will take only half, which would be 4 units, in the morning on the day of surgery  Plan:     (I73.9) Insufficiency, arterial, peripheral (H)  Comment: Continue to monitor  Plan:     (E66.01) Morbid obesity (H)  Comment: No acute concerns.  This will be monitored during surgery and postoperatively  Plan:     (N18.3) CKD (chronic kidney disease) stage 3, GFR 30-59 ml/min (H)  Comment: We will recheck renal function today  Plan:     (G47.33) ANNETTE (obstructive sleep apnea)  Comment: Continue use of CPAP  Plan:     (E89.0) Postsurgical hypothyroidism  Comment: Recheck thyroid with endocrinology  Plan:     (I10) Benign essential hypertension  Comment: Blood pressure is elevated today.  Will need to recheck before you leave.  We will plan to resume blood pressure medications including amlodipine and follow up in one week after surgery  Plan:              Signed Electronically by: Shola Benoit MD, MD    Copy of this evaluation report is provided to requesting  physician.    [facility] Preop Guidelines    Revised Cardiac Risk Index

## 2020-05-19 LAB
ANION GAP SERPL CALCULATED.3IONS-SCNC: 9 MMOL/L (ref 3–14)
BUN SERPL-MCNC: 19 MG/DL (ref 7–30)
CALCIUM SERPL-MCNC: 9.2 MG/DL (ref 8.5–10.1)
CHLORIDE SERPL-SCNC: 106 MMOL/L (ref 94–109)
CO2 SERPL-SCNC: 25 MMOL/L (ref 20–32)
CREAT SERPL-MCNC: 1.07 MG/DL (ref 0.52–1.04)
GFR SERPL CREATININE-BSD FRML MDRD: 51 ML/MIN/{1.73_M2}
GLUCOSE SERPL-MCNC: 123 MG/DL (ref 70–99)
POTASSIUM SERPL-SCNC: 4.4 MMOL/L (ref 3.4–5.3)
SODIUM SERPL-SCNC: 140 MMOL/L (ref 133–144)

## 2020-05-19 NOTE — TELEPHONE ENCOUNTER
Routing refill request to provider for review/approval because:  Labs not current:  Lipid panel

## 2020-05-20 RX ORDER — MINOXIDIL 2.5 MG/1
TABLET ORAL
Qty: 60 TABLET | Refills: 1 | OUTPATIENT
Start: 2020-05-20

## 2020-05-20 RX ORDER — FENOFIBRATE 145 MG/1
TABLET, COATED ORAL
Qty: 90 TABLET | Refills: 3 | Status: SHIPPED | OUTPATIENT
Start: 2020-05-20 | End: 2020-11-04

## 2020-05-21 NOTE — RESULT ENCOUNTER NOTE
Gerard Gaspar,    I have had the opportunity to review your recent results and an interpretation is as follows:  Your basic metabolic panel shows stable renal function  Your complete blood counts shows stable anemia -  We should recheck your iron levels at our next office visit     Sincerely,  Shola Benoit MD

## 2020-05-22 ENCOUNTER — TRANSFERRED RECORDS (OUTPATIENT)
Dept: HEALTH INFORMATION MANAGEMENT | Facility: CLINIC | Age: 75
End: 2020-05-22

## 2020-06-11 ENCOUNTER — MEDICAL CORRESPONDENCE (OUTPATIENT)
Dept: HEALTH INFORMATION MANAGEMENT | Facility: CLINIC | Age: 75
End: 2020-06-11

## 2020-06-14 ENCOUNTER — DOCUMENTATION ONLY (OUTPATIENT)
Dept: FAMILY MEDICINE | Facility: CLINIC | Age: 75
End: 2020-06-14

## 2020-06-14 NOTE — PROGRESS NOTES
[facility] now requests orders and shares plan of care/discharge summaries for some patients through [employer].  Please REPLY TO THIS MESSAGE OR ROUTE BACK TO THE AUTHOR in order to give authorization for orders when needed.  This is considered a verbal order, you will still receive a faxed copy of orders for signature.  Thank you for your assistance in improving collaboration for our patients.    ORDER SN 2w3, 1w1, 3 as needed visits to evaluate, educate, assess pain management, disease process management, medication changes, and labs as needed.  PT 1 every 10 day 1 to evaluate and treat to include strength, mobility, balance, gait training, and fall prevention safety.  OT 1 every 10 day 1 to evaluate and treat to include adaptive techniques and equipment needs, bathing safety, endurance, and ADL/IADL safety.   HHA 2w4 to assist with bathing safety and personal cares.    Client is home from hospitalization and TCU stay following a Left total hip arthroplasty.

## 2020-06-15 ENCOUNTER — TELEPHONE (OUTPATIENT)
Dept: FAMILY MEDICINE | Facility: CLINIC | Age: 75
End: 2020-06-15

## 2020-06-15 NOTE — TELEPHONE ENCOUNTER
[facility] now requests orders and shares plan of care/discharge summaries for some patients through [employer]. Please REPLY TO THIS MESSAGE OR ROUTE BACK TO THE AUTHOR in order to give authorization for orders when needed. This is considered a verbal order, you will still receive a faxed copy of orders for signature.  Thank you for your assistance in improving collaboration for our patients.    ORDER:  PT 2w4 for LE strengthening, transfer training, gait training, standing balance training, pain management education, HEP, teaching hip precautions, and fall prevention education in order to maximize safe functional mobility in the home and decrease risk for falls following L TRACIE performed by Dr Nik Henderson on 5/22/2020.    Ming Davison, PT, DPT  [email or web address]  [phone]

## 2020-06-17 ENCOUNTER — TELEPHONE (OUTPATIENT)
Dept: FAMILY MEDICINE | Facility: CLINIC | Age: 75
End: 2020-06-17

## 2020-06-17 NOTE — TELEPHONE ENCOUNTER
[facility] now requests orders and shares plan of care/discharge summaries for some patients through [employer].  Please REPLY TO THIS MESSAGE OR ROUTE BACK TO THE AUTHOR in order to give authorization for orders when needed.  This is considered a verbal order, you will still receive a faxed copy of orders for signature.  Thank you for your assistance in improving collaboration for our patients.    ORDER    OT 2w2 for UE strengthening, ADL/IADL safety, energy conservation    SW to see patient 1:30 days via in-person, telephone or video visit for community resources    Pt did report a fall this AM with EMS called to get pt up into recliner. Vitals WNL and no abrasions or bruises noted at time of OT visit.    Evelyn Christianson, OTR/L  [phone]

## 2020-06-18 ENCOUNTER — TELEPHONE (OUTPATIENT)
Dept: FAMILY MEDICINE | Facility: CLINIC | Age: 75
End: 2020-06-18

## 2020-06-18 NOTE — TELEPHONE ENCOUNTER
Duplicate encounter.  Spoke to Melody, home care and gave verbal okay for home care; PCP already responded also.  Jessica Sanders RN on 6/18/2020 at 11:36 AM

## 2020-06-18 NOTE — TELEPHONE ENCOUNTER
Reason for Call:  Home Health Care    Melody with [facility] called regarding (reason for call): Orders    Orders are needed for this patient. Social Work Evaluation        SSW :  Evaluation to start today           Phone Number Homecare Nurse can be reached at: [phone]    Can we leave a detailed message on this number? Not Applicable      Call taken on 6/18/2020 at 11:26 AM by Kayleen Tony

## 2020-07-10 ENCOUNTER — TELEPHONE (OUTPATIENT)
Dept: FAMILY MEDICINE | Facility: CLINIC | Age: 75
End: 2020-07-10

## 2020-07-10 NOTE — TELEPHONE ENCOUNTER
Okay for home care orders    Can we set up a virtual visit in two weeks to follow up?    Shola Benoit MD, MD

## 2020-07-10 NOTE — TELEPHONE ENCOUNTER
[facility] now requests orders and shares plan of care/discharge summaries for some patients through [facility]. Please REPLY TO THIS MESSAGE OR ROUTE BACK TO THE AUTHOR in order to give authorization for orders when needed. This is considered a verbal order, you will still receive a faxed copy of orders for signature.  Thank you for your assistance in improving collaboration for our patients.    ORDER:  Continue PT 2w2, 1w1 as pt is moving into a new [facility] and will need home assessment/fall prevention education and continued gait and transfer training to increase her ability to move safely in her new home.  Continue also with progressing LE strength, balance, and HEP for reaching PLOF and max potential. She is still at a high risk for falling.    Continue HHA 1w1, 2w2 to assist with bathing and personal hygiene cares as she transitions to new living environment.    FYI, pt reports she fell backward last night when trying to keep her dog from a plate.  EMS came and assisted her up.  She denies injury, but admits to soreness in her shoulders.      Ming Davison, PT, DPT  [email or web address]  [phone]

## 2020-07-11 NOTE — TELEPHONE ENCOUNTER
Yes, I will ask her to call your office for a virtual follow up when I next see her on Wednesday.      She is moving to her [facility] on Tuesday 7/14.    New address is:  [address]    Correction: [facility] order for 1w1, 2w1 (vs 2w2).      Thank you!    Ming Davison, PT, DPT  [email or web address]  [phone]

## 2020-07-14 ENCOUNTER — TELEPHONE (OUTPATIENT)
Dept: FAMILY MEDICINE | Facility: CLINIC | Age: 75
End: 2020-07-14

## 2020-07-14 NOTE — TELEPHONE ENCOUNTER
[facility] now requests orders and shares plan of care/discharge summaries for some patients through [employer]. Please REPLY TO THIS MESSAGE OR ROUTE BACK TO THE AUTHOR in order to give authorization for orders when needed. This is considered a verbal order, you will still receive a faxed copy of orders for signature.  Thank you for your assistance in improving collaboration for our patients.    ORDER:  Can I get an order for OT eval and treat? She's moving into her new [facility] today and with her history of falls in bathrooms, I'd like to have OT come out and do an assessment/teaching for safety in her new environment (ie bathroom, kitchen, laundry).    Ming Davison, PT, DPT  [email or web address]  [phone]

## 2020-07-16 ENCOUNTER — DOCUMENTATION ONLY (OUTPATIENT)
Dept: FAMILY MEDICINE | Facility: CLINIC | Age: 75
End: 2020-07-16

## 2020-07-16 NOTE — PROGRESS NOTES
Dear Dr. Benoit,    Medicare Home Health regulations requires [facility] to notify the Physician when the plan for visits has been altered.  We have provided fewer visits than ordered.  We are notifying you of a Missed Visit.  Chasity Hodgson; MRN [id number]  Missed Visit  Is HA VISIT  Dates of missed services  7/16/20  Reason: Patient cancelled   Sincerely [facility]  Angela Guzman  [phone]

## 2020-07-18 DIAGNOSIS — I10 ESSENTIAL HYPERTENSION: ICD-10-CM

## 2020-07-20 RX ORDER — LISINOPRIL 40 MG/1
TABLET ORAL
Qty: 90 TABLET | Refills: 0 | Status: SHIPPED | OUTPATIENT
Start: 2020-07-20 | End: 2020-10-28

## 2020-07-22 ENCOUNTER — OFFICE VISIT (OUTPATIENT)
Dept: FAMILY MEDICINE | Facility: CLINIC | Age: 75
End: 2020-07-22
Payer: MEDICARE

## 2020-07-22 VITALS
WEIGHT: 182 LBS | HEIGHT: 60 IN | OXYGEN SATURATION: 100 % | BODY MASS INDEX: 35.73 KG/M2 | HEART RATE: 55 BPM | SYSTOLIC BLOOD PRESSURE: 145 MMHG | DIASTOLIC BLOOD PRESSURE: 66 MMHG | TEMPERATURE: 96.8 F

## 2020-07-22 DIAGNOSIS — N18.30 CKD (CHRONIC KIDNEY DISEASE) STAGE 3, GFR 30-59 ML/MIN (H): Primary | ICD-10-CM

## 2020-07-22 DIAGNOSIS — E11.9 TYPE 2 DIABETES, HBA1C GOAL < 7% (H): ICD-10-CM

## 2020-07-22 DIAGNOSIS — I10 BENIGN ESSENTIAL HYPERTENSION: ICD-10-CM

## 2020-07-22 DIAGNOSIS — I73.9 INSUFFICIENCY, ARTERIAL, PERIPHERAL (H): ICD-10-CM

## 2020-07-22 LAB
ERYTHROCYTE [DISTWIDTH] IN BLOOD BY AUTOMATED COUNT: 13.4 % (ref 10–15)
HCT VFR BLD AUTO: 36.6 % (ref 35–47)
HGB BLD-MCNC: 12.3 G/DL (ref 11.7–15.7)
MCH RBC QN AUTO: 31.2 PG (ref 26.5–33)
MCHC RBC AUTO-ENTMCNC: 33.6 G/DL (ref 31.5–36.5)
MCV RBC AUTO: 93 FL (ref 78–100)
PLATELET # BLD AUTO: 251 10E9/L (ref 150–450)
RBC # BLD AUTO: 3.94 10E12/L (ref 3.8–5.2)
WBC # BLD AUTO: 10.3 10E9/L (ref 4–11)

## 2020-07-22 PROCEDURE — 36415 COLL VENOUS BLD VENIPUNCTURE: CPT | Performed by: INTERNAL MEDICINE

## 2020-07-22 PROCEDURE — 99214 OFFICE O/P EST MOD 30 MIN: CPT | Performed by: INTERNAL MEDICINE

## 2020-07-22 PROCEDURE — 85027 COMPLETE CBC AUTOMATED: CPT | Performed by: INTERNAL MEDICINE

## 2020-07-22 PROCEDURE — 80048 BASIC METABOLIC PNL TOTAL CA: CPT | Performed by: INTERNAL MEDICINE

## 2020-07-22 ASSESSMENT — PATIENT HEALTH QUESTIONNAIRE - PHQ9: SUM OF ALL RESPONSES TO PHQ QUESTIONS 1-9: 1

## 2020-07-22 ASSESSMENT — MIFFLIN-ST. JEOR: SCORE: 1252.05

## 2020-07-22 NOTE — PROGRESS NOTES
Subjective     Chasity Hodgson is a 74 year old female who presents to clinic today for the following health issues:    HPI       Follow up Hip replacement on 5/22/20    Status post total hip arthroplasty left    Sammie presents the clinic today for follow-up of recent total hip arthroplasty.  Surgery was performed on May 22 at [facility].  She was monitored closely during her postoperative period by the hospitalist service as well as orthopedic surgery.  She eventually was discharged home with recommendations to continue physical therapy.  Her blood pressure was noted to be elevated while she was in the hospital.  She has been followed by home care since discharge and is continued on her prior to admission medications including amlodipine, lisinopril and minoxidil as well as furosemide 3 times a week.    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)     Anemia due to blood loss, acute     ACP (advance care planning)     Postsurgical hypothyroidism     Type 2 diabetes, HbA1c goal < 7% (H)     Gastroparesis     Papillary carcinoma of thyroid (H)     Alopecia     Pulmonary nodule     Esophageal reflux     Dermatitis     Acne rosacea     Diabetic gastroparesis (H)     Poliomyelitis     Left knee pain     Carotid stenosis     Right shoulder pain     Type 2 diabetes mellitus with other specified complication (H)     Insufficiency, arterial, peripheral (H)     Allergic dermatitis due to other chemical product     Normocytic anemia     Morbid obesity (H)     Mild major depression (H)     CKD (chronic kidney disease) stage 3, GFR 30-59 ml/min (H)     Past Surgical History:   Procedure Laterality Date     AMPUTATE TOE(S)  3/15/2012    Procedure:AMPUTATE  TOE(S); Surgeon:SUKHDEEP METZ; Location: OR     APPENDECTOMY  1972     ARTHRODESIS FOOT  3/15/2012    Procedure:ARTHRODESIS FOOT; RIGHT TRIPLE ARTHRODESIS, FIFTH TOE AMPUTATION, LATERAL LIGAMENT RECONSTRUCTION, TENDON TRANSFER AND RELEASE [MINI C-ARM, ARTHREX 4.5 AND 6.7 STAPLES, BIOCOMPOSITE TENODESIS SCREWS]; Surgeon:SUKHDEEP METZ; Location: OR     C FREEING BOWEL ADHESION,ENTEROLYSIS      1986, 1996, 1999     C NONSPECIFIC PROCEDURE      throidectomy     C TOTAL ABDOM HYSTERECTOMY  1980    + BSO     CHOLECYSTECTOMY       COLOSTOMY  2/7/2012    Procedure:COLOSTOMY; CREATION OF SIGMOID COLOSTOMY AND EXTENSIVE  LYSIS OF ADHESIONS; Surgeon:MONTSERRAT BENDER; Location: OR     GI SURGERY      weakened rectal sphincter with artificial stimulator     LAPAROTOMY, LYSIS ADHESIONS, COMBINED  2/7/2012    Procedure:COMBINED LAPAROTOMY, LYSIS ADHESIONS; Surgeon:MONTSERRAT BENDER; Location: OR     RELEASE TENDON FOOT  3/15/2012    Procedure:RELEASE TENDON FOOT; Surgeon:SUKHDEEP METZ; Location: OR     REMOVE HARDWARE FOOT  12/13/2012    Procedure: REMOVE HARDWARE FOOT;  RIGHT FOOT REMOVAL OF HARDWARE;  Surgeon: Sukhdeep Metz MD;  Location:  OR       Social History     Tobacco Use     Smoking status: Never Smoker     Smokeless tobacco: Never Used   Substance Use Topics     Alcohol use: Not Currently     Alcohol/week: 0.0 standard drinks     Family History   Problem Relation Age of Onset     Arthritis Mother      Hypertension Mother      Cerebrovascular Disease Mother      Obesity Mother      Heart Disease Mother         MI's     Hypertension Father      Respiratory Father         Adult RDS     Diabetes Father         adult     Arthritis Sister      Cancer Sister      Arthritis Sister      Hypertension Sister      Cancer Sister         colon polup     Heart Disease Brother         MI at 54     Hypertension Sister      Lipids Brother      Hypertension Brother      Lipids  Sister      Obesity Sister      Obesity Maternal Grandmother      Skin Cancer Maternal Grandmother         skin cancer unknown     Cancer Maternal Grandmother         unknown skin cancer on face         Current Outpatient Medications   Medication Sig Dispense Refill     albuterol (PROAIR HFA/PROVENTIL HFA/VENTOLIN HFA) 108 (90 Base) MCG/ACT inhaler Inhale 2-4 puffs into the lungs every 2 hours as needed for shortness of breath / dyspnea 1 Inhaler 1     amLODIPine (NORVASC) 2.5 MG tablet Take 1 tablet (2.5 mg) by mouth 2 times daily 180 tablet 3     aspirin (SB LOW DOSE ASA EC) 81 MG EC tablet Take 81 mg by mouth daily       atenolol (TENORMIN) 50 MG tablet Take 1 tablet (50 mg) by mouth 2 times daily 180 tablet 3     Azelastine HCl (ASTEPRO NA)        BIOTIN PO Take 1,000 mcg by mouth       Cholecalciferol (VITAMIN D-3 PO) Take 2 tablets by mouth       clotrimazole (LOTRIMIN) 1 % cream Apply topically daily       Continuous Glucose Monitor KIT 1 each every 14 days 1 kit 25     diphenhydrAMINE-acetaminophen (TYLENOL PM)  MG tablet Take 1 tablet by mouth At Bedtime Reported on 3/20/2017       ezetimibe (ZETIA) 10 MG tablet Take 1 tablet (10 mg) by mouth daily 90 tablet 3     famotidine (PEPCID) 40 MG tablet Take 40 mg by mouth as needed        fenofibrate (TRICOR) 145 MG tablet TAKE ONE TABLET BY MOUTH ONCE DAILY 90 tablet 3     ferrous sulfate (IRON) 325 (65 FE) MG tablet Take 325 mg by mouth daily (with breakfast)       fexofenadine (ALLEGRA) 180 MG tablet Take 180 mg by mouth daily. 120 0     fluticasone (FLONASE) 50 MCG/ACT spray Spray 2 sprays in nostril daily 2 sprays in each nostril qd 1 Bottle 0     furosemide (LASIX) 20 MG tablet Take 1 tablet (20 mg) by mouth daily And Take additional 20 mg in the afternoon M,W,F 135 tablet 3     gabapentin (NEURONTIN) 100 MG capsule Take 1 capsule (100 mg) by mouth every evening as needed 90 capsule 3     insulin glargine U-300 (TOUJEO) 300 UNIT/ML (1 units dial) pen  Inject 8 Units Subcutaneous every morning 15 mL 1     levothyroxine (SYNTHROID) 112 MCG tablet Take 112 mcg by mouth daily Take 112 mcg daily except for Friday takes only 56 mcg.  Brand name Synthroid       lisinopril (ZESTRIL) 40 MG tablet TAKE ONE TABLET BY MOUTH ONCE DAILY 90 tablet 0     metoclopramide (REGLAN) 5 MG tablet Take 1-2 tablets (5-10 mg) by mouth 2 times daily as needed a 120 tablet 0     minoxidil (LONITEN) 2.5 MG tablet One half tablet daily 60 tablet 1     MULTIVITAMINS OR TABS ONE DAILY 100 3     nitroGLYcerin (NITROSTAT) 0.4 MG sublingual tablet Place 1 tablet (0.4 mg) under the tongue every 5 minutes as needed for chest pain (no more than 3 in one hour; after 3rd, call 911.) 25 tablet 3     nystatin (MYCOSTATIN) cream Apply topically daily as needed        nystatin-triamcinolone (MYCOLOG II) cream Apply topically daily as needed       ondansetron (ZOFRAN) 4 MG tablet Take 1 tablet by mouth every 6 hours as needed Reported on 3/20/2017       order for DME Equipment being ordered: Compression stockings - Knee High; 20-30 mmHg compression - note would like adhesive band to keep the stocking from sliding down 3 each 0     order for DME Donut Pillow 1 each 0     order for DME Equipment being ordered: Oral appliance for sleep apnea 1 Units 0     pantoprazole (PROTONIX) 40 MG EC tablet Take 40 mg by mouth 2 times daily       sitagliptin (JANUVIA) 50 MG tablet Take 1 tablet (50 mg) by mouth daily In the morning       Allergies   Allergen Reactions     Nsaids Difficulty breathing     Increased creatinine     Toradol Difficulty breathing     Shortness of breath     Celecoxib Itching and Rash     Codeine Itching     With higher doses     Crestor [Rosuvastatin] Muscle Pain (Myalgia)     No Clinical Screening - See Comments Itching     Fragrance     Vioxx Other (See Comments)     Heart races     Conjugated Estrogens Rash     Sulfa Drugs Rash     Recent Labs   Lab Test 05/18/20  1423 01/16/20  2665   09/10/19  1202  03/29/19  1355 11/05/18  1413  08/01/18  1152  02/12/18  1149  01/29/18 01/02/18  1801 08/01/17 02/15/17 02/14/17   A1C  --   --   --  6.8*  --  8.8* 8.1*  --   --   --  6.5*  --  6.7*  --   --   --   --  8.5   LDL  --   --   --   --   --  99  --   --   --   --   --   --  105*  --   --   --  49  --    HDL  --   --   --   --   --  27*  --   --   --   --   --   --  39*  --   --  35* 31  --    TRIG  --   --   --   --   --  349*  --   --   --   --   --   --  290*  --   --  451* 309  --    ALT  --   --   --   --   --  25  --   --  25  --  29  --   --    < >  --   --   --   --    CR 1.07* 1.35*   < > 1.49*   < > 1.20* 1.17*   < > 1.24*   < > 1.33*   < > 1.41*   < >  --   --  0.94  --    GFRESTIMATED 51* 39*   < > 35*   < > 45* 45*   < > 43*   < > 39*   < > 37*   < >  --   --  61  --    GFRESTBLACK 59* 45*   < > 40*   < > 52* 55*   < > 51*   < > 48*   < > 43*   < >  --   --  71  --    POTASSIUM 4.4 3.9   < > 4.9   < > 4.7 4.6   < > 4.6   < > 4.3   < > 4.6   < >  --   --  4.2  --    TSH  --   --   --   --   --   --   --   --   --   --   --   --   --   --  0.50  --   --  1.00    < > = values in this interval not displayed.      BP Readings from Last 3 Encounters:   07/22/20 (!) 145/66   05/18/20 (!) 176/78   01/21/20 128/59    Wt Readings from Last 3 Encounters:   07/22/20 82.6 kg (182 lb)   05/18/20 90.3 kg (199 lb 1.6 oz)   01/21/20 92.2 kg (203 lb 3.2 oz)                    Reviewed and updated as needed this visit by Provider         Review of Systems   Constitutional, HEENT, cardiovascular, pulmonary, GI, , musculoskeletal, neuro, skin, endocrine and psych systems are negative, except as otherwise noted.      Objective    BP (!) 145/66 (BP Location: Right arm, Cuff Size: Adult Large)   Pulse 55   Temp 96.8  F (36  C) (Temporal)   Ht 1.524 m (5')   Wt 82.6 kg (182 lb)   SpO2 100%   Breastfeeding No   BMI 35.54 kg/m    Body mass index is 35.54 kg/m .  Physical Exam   GENERAL: healthy, alert and  no distress  EYES: Eyes grossly normal to inspection,   NECK: no adenopathy, no asymmetry, masses, or scars and thyroid normal to palpation  RESP: lungs clear to auscultation - no rales, rhonchi or wheezes  CV: regular rate and rhythm, normal S1 S2, no S3 or S4, no murmur, click or rub, trace peripheral edema and peripheral pulses strong  ABDOMEN: obese, soft, nontender, no hepatosplenomegaly   MS: no gross musculoskeletal defects noted, no edema  SKIN: no suspicious lesions or rashes  NEURO: Normal strength and tone, mentation intact and speech normal  PSYCH: mentation appears normal, affect normal/bright    Diagnostic Test Results:  Labs reviewed in Epic        Assessment & Plan     Patient Instructions   (N18.3) CKD (chronic kidney disease) stage 3, GFR 30-59 ml/min (H)  (primary encounter diagnosis)  Comment: We will check basic metabolic panel and complete blood counts today and request a renal ultrasound with doppler.  I would recommend also consult in medical therapeutic management to discuss options to improve blood pressure control.  Plan: MED THERAPY MANAGE REFERRAL, US Renal Complete         w Doppler Complete, Basic metabolic panel, CBC         with platelets            (I10) Benign essential hypertension  Comment: as above - look into secondary causes of hypertension   Plan: MED THERAPY MANAGE REFERRAL, US Renal Complete         w Doppler Complete            (E11.9) Type 2 diabetes, HbA1c goal < 7% (H)  Comment: Continue to follow up in endocrinology   Plan: MED THERAPY MANAGE REFERRAL            (I73.9) Insufficiency, arterial, peripheral (H)  Comment:   Plan: order for DME                   BMI:   Estimated body mass index is 35.54 kg/m  as calculated from the following:    Height as of this encounter: 1.524 m (5').    Weight as of this encounter: 82.6 kg (182 lb).       Return in about 3 months (around 10/22/2020).    Shola Benoit MD, MD  [facility]

## 2020-07-22 NOTE — PATIENT INSTRUCTIONS
(N18.3) CKD (chronic kidney disease) stage 3, GFR 30-59 ml/min (H)  (primary encounter diagnosis)  Comment: We will check basic metabolic panel and complete blood counts today and request a renal ultrasound with doppler.  I would recommend also consult in medical therapeutic management to discuss options to improve blood pressure control.  Plan: MED THERAPY MANAGE REFERRAL, US Renal Complete         w Doppler Complete, Basic metabolic panel, CBC         with platelets            (I10) Benign essential hypertension  Comment: as above - look into secondary causes of hypertension   Plan: MED THERAPY MANAGE REFERRAL, US Renal Complete         w Doppler Complete            (E11.9) Type 2 diabetes, HbA1c goal < 7% (H)  Comment: Continue to follow up in endocrinology   Plan: MED THERAPY MANAGE REFERRAL            (I73.9) Insufficiency, arterial, peripheral (H)  Comment:   Plan: order for DME

## 2020-07-23 LAB
ANION GAP SERPL CALCULATED.3IONS-SCNC: 5 MMOL/L (ref 3–14)
BUN SERPL-MCNC: 23 MG/DL (ref 7–30)
CALCIUM SERPL-MCNC: 9.2 MG/DL (ref 8.5–10.1)
CHLORIDE SERPL-SCNC: 109 MMOL/L (ref 94–109)
CO2 SERPL-SCNC: 26 MMOL/L (ref 20–32)
CREAT SERPL-MCNC: 1.08 MG/DL (ref 0.52–1.04)
GFR SERPL CREATININE-BSD FRML MDRD: 51 ML/MIN/{1.73_M2}
GLUCOSE SERPL-MCNC: 115 MG/DL (ref 70–99)
POTASSIUM SERPL-SCNC: 4.9 MMOL/L (ref 3.4–5.3)
SODIUM SERPL-SCNC: 140 MMOL/L (ref 133–144)

## 2020-07-23 NOTE — RESULT ENCOUNTER NOTE
Gerard Gaspar,    I have had the opportunity to review your recent results and an interpretation is as follows:  Your follow up complete blood counts and basic metabolic panel returned stable.    Congratulaions on your excellent results      Sincerely,  Shola Benoit MD

## 2020-07-24 ENCOUNTER — TELEPHONE (OUTPATIENT)
Dept: FAMILY MEDICINE | Facility: CLINIC | Age: 75
End: 2020-07-24

## 2020-07-24 NOTE — TELEPHONE ENCOUNTER
MTM referral from: [facility] visit (referral by provider)    MTM referral outreach attempt #2 on July 24, 2020 at 2:05 PM      Outcome: Patient not reachable after several attempts, will route to MTM Pharmacist/Provider as an FYI. Thank you for the referral.    Yadira Waite, MTM Coordinator

## 2020-07-26 NOTE — TELEPHONE ENCOUNTER
Sent Berny rocheg  Catrachita Prather, PharmD, [facility]  Medication Therapy Management Provider  Pager: [phone]

## 2020-07-29 ENCOUNTER — VIRTUAL VISIT (OUTPATIENT)
Dept: CARDIOLOGY | Facility: CLINIC | Age: 75
End: 2020-07-29
Payer: MEDICARE

## 2020-07-29 DIAGNOSIS — I10 BENIGN ESSENTIAL HYPERTENSION: Primary | ICD-10-CM

## 2020-07-29 PROCEDURE — 99214 OFFICE O/P EST MOD 30 MIN: CPT | Mod: 95 | Performed by: INTERNAL MEDICINE

## 2020-07-29 NOTE — LETTER
7/29/2020    Shola Benoit MD, MD  [address]  [facility] [address]    RE: Chasity Hodgson       Dear Colleague,    I had the pleasure of seeing Chasity Hodgson in the [facility].    Chasity Hodgson is a 74 year old female who is being evaluated via a billable video visit.        Cardiology Clinic Note: Video        Assessment & Plan         1.  Nonocclusive coronary disease by angiogram 2018  2.  Chronic kidney disease  3.  Hypertension  4.  PAD  5.  Diabetes mellitus  6.  Hyperlipidemia - Statin intolerance   7.  Hypothyroidism  8.  History of DVT  9.  ANNETTE  10.  History of polio age to right foot brace due to deformity  11.  History of colorectal surgery currently has a colostomy, is in need of a minor procedure, outpatient  12.  Recent left hip surgery due to avascular necrosis at [facility].  Patient did well with no cardiovascular sequelae     Recommendations     1.  Atherosclerotic coronary artery disease : nonocclusive coronary artery disease by cardiac cath.  Stable symptoms.  We will continue with secondary prevention    2.  Hyperlipidemia: Has not been able to tolerate statins due to multiple allergies.  Continue with her current regimen.  LDL was 99 on last check.  Possible role for PCSK9 inhibitor should her numbers not be at goal in the future    3.  Overall patient is doing well from a cardiovascular perspective.  Main issues are orthopedic which have since improved with her recent surgery.  We will have her return to clinic in 1 years time      Ankit Bhatia MD         HPI:     Patient is a very pleasant 74-year-old female who has been followed by my colleague Dr. Ortiz for several years.  Dr. Ortiz is transitioning her clinic to [facility] primarily.  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   Her cardiac history is notable for nonocclusive coronary artery disease by coronary angiogram in 2018.  This was performed for  pre-operative clearance for right shoulder surgery.  She underwent her surgery without difficulty.  Her other past medical history is as noted above.  She has long-standing history of hypertension and is on a multidrug regimen.  She also has chronic kidney disease.  She is in need of a minor colorectal procedure per patient.  This is an outpatient procedure that require some revision of her previous colostomy however through noninvasive measures.  Cardiac wise she is at baseline with no active symptoms.    Since my last visit she has mainly orthopedic issues.  She had avascular necrosis for which she underwent left hip surgery in May 2020.  She had a uneventful hospital course and tells me that there was no mention of any cardiovascular sequelae during her procedure.  Her  unfortunately has had difficulty with his Parkinson's and has now moved into a nursing home in March 2020.  As a result she has downsized and is now in a townhome to her relief.  She was worried about maintaining a house by herself.  Otherwise is taking all of her medications and is here for her yearly visit.       Thank you for allowing me to participate in the care of your patient.    Sincerely,     Ankit Bhatia MD     [facility]

## 2020-07-29 NOTE — PROGRESS NOTES
"Chasity Hodgson is a 74 year old female who is being evaluated via a billable video visit.      The patient has been notified of following:     \"This video visit will be conducted via a call between you and your physician/provider. We have found that certain health care needs can be provided without the need for an in-person physical exam.  This service lets us provide the care you need with a video conversation.  If a prescription is necessary we can send it directly to your pharmacy.  If lab work is needed we can place an order for that and you can then stop by our lab to have the test done at a later time.    Video visits are billed at different rates depending on your insurance coverage.  Please reach out to your insurance provider with any questions.    If during the course of the call the physician/provider feels a video visit is not appropriate, you will not be charged for this service.\"    Patient has given verbal consent for Video visit? Yes  How would you like to obtain your AVS? MyChart  If you are dropped from the video visit, the video invite should be resent to: Text to cell phone: [phone]  Will anyone else be joining your video visit? No    Review Of Systems  Skin: brusing  Eyes: {  Ears/Nose/Throat:   Respiratory: neg  Cardiovascular: chest pain, edema  Gastrointestinal: neg  Genitourinary: {  Musculoskeletal: joint pain  Neurologic:   Psychiatric:   Hematologic/Lymphatic/Immunologic:   Endocrine: {    Juany Borges MA    Video-Visit Details    Type of service:  Video Visit    Video Start Time: !:50 PM  Video End Time: 2:06 PM    Originating Location (pt. Location): Home    Distant Location (provider location):  [facility]     Platform used for Video Visit: Jenise Bhatia MD           Cardiology Clinic Note: Video        Assessment & Plan         1.  Nonocclusive coronary disease by angiogram 2018  2.  Chronic kidney disease  3.  " Hypertension  4.  PAD  5.  Diabetes mellitus  6.  Hyperlipidemia - Statin intolerance   7.  Hypothyroidism  8.  History of DVT  9.  ANNETTE  10.  History of polio age to right foot brace due to deformity  11.  History of colorectal surgery currently has a colostomy, is in need of a minor procedure, outpatient  12.  Recent left hip surgery due to avascular necrosis at [facility].  Patient did well with no cardiovascular sequelae     Recommendations     1.  Atherosclerotic coronary artery disease : nonocclusive coronary artery disease by cardiac cath.  Stable symptoms.  We will continue with secondary prevention    2.  Hyperlipidemia: Has not been able to tolerate statins due to multiple allergies.  Continue with her current regimen.  LDL was 99 on last check.  Possible role for PCSK9 inhibitor should her numbers not be at goal in the future    3.  Overall patient is doing well from a cardiovascular perspective.  Main issues are orthopedic which have since improved with her recent surgery.  We will have her return to clinic in 1 years time      Ankit Bhatia MD         HPI:     Patient is a very pleasant 74-year-old female who has been followed by my colleague Dr. Ortiz for several years.  Dr. Ortiz is transitioning her clinic to [facility] primarily.  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   Her cardiac history is notable for nonocclusive coronary artery disease by coronary angiogram in 2018.  This was performed for pre-operative clearance for right shoulder surgery.  She underwent her surgery without difficulty.  Her other past medical history is as noted above.  She has long-standing history of hypertension and is on a multidrug regimen.  She also has chronic kidney disease.  She is in need of a minor colorectal procedure per patient.  This is an outpatient procedure that require some revision of her previous colostomy however through noninvasive measures.  Cardiac wise she is at baseline  with no active symptoms.    Since my last visit she has mainly orthopedic issues.  She had avascular necrosis for which she underwent left hip surgery in May 2020.  She had a uneventful hospital course and tells me that there was no mention of any cardiovascular sequelae during her procedure.  Her  unfortunately has had difficulty with his Parkinson's and has now moved into a nursing home in March 2020.  As a result [facility]  She was worried about maintaining a house by herself.  Otherwise is taking all of her medications and is here for her yearly visit.

## 2020-07-31 ENCOUNTER — TELEPHONE (OUTPATIENT)
Dept: FAMILY MEDICINE | Facility: CLINIC | Age: 75
End: 2020-07-31

## 2020-07-31 ENCOUNTER — HOSPITAL ENCOUNTER (OUTPATIENT)
Dept: ULTRASOUND IMAGING | Facility: CLINIC | Age: 75
Discharge: HOME | End: 2020-07-31
Attending: INTERNAL MEDICINE | Admitting: INTERNAL MEDICINE
Payer: MEDICARE

## 2020-07-31 DIAGNOSIS — N18.30 CKD (CHRONIC KIDNEY DISEASE) STAGE 3, GFR 30-59 ML/MIN (H): ICD-10-CM

## 2020-07-31 DIAGNOSIS — I10 BENIGN ESSENTIAL HYPERTENSION: ICD-10-CM

## 2020-07-31 DIAGNOSIS — I70.1 RENAL ARTERY STENOSIS (H): Primary | ICD-10-CM

## 2020-07-31 PROCEDURE — 76770 US EXAM ABDO BACK WALL COMP: CPT | Mod: 59

## 2020-07-31 NOTE — TELEPHONE ENCOUNTER
Can we call Chasity Hodgson and let her know that     Gerard Gaspar,    I have had the opportunity to review your recent results and an interpretation is as follows:  Your ultrasound does appear to show elevated renal artery velocities consistent with renal artery stenosis.  I would recommend consultation in nephrology to help with this as well as to help with blood pressure control    N: [facility] Paulino Burns [phone]   [email or web address]    Sincerely,  Shola Benoit MD

## 2020-07-31 NOTE — RESULT ENCOUNTER NOTE
Gerard Gaspar,    I have had the opportunity to review your recent results and an interpretation is as follows:  Your ultrasound does appear to show elevated renal artery velocities consistent with renal artery stenosis.  I would recommend consultation in nephrology to help with this as well as to help with blood pressure control    [facility]: [facility] Paulino Burns [phone]   [email or web address]    Sincerely,  Shola Benoit MD

## 2020-08-02 DIAGNOSIS — L64.9 ANDROGENETIC ALOPECIA: ICD-10-CM

## 2020-08-03 NOTE — TELEPHONE ENCOUNTER
"Bolded home no.is \"not in service\". Removed from Epic per her request.   Called mobile no. And reached pt.    Recommendations given per PCP's message below.  She will call [facility] to schedule an office visit asap.    Jessica Sanders RN on 8/3/2020 at 11:08 AM      "

## 2020-08-06 RX ORDER — MINOXIDIL 2.5 MG/1
TABLET ORAL
Qty: 60 TABLET | Refills: 1 | OUTPATIENT
Start: 2020-08-06

## 2020-08-06 NOTE — TELEPHONE ENCOUNTER
MINOXIDIL 2.5MG TABS  One half tablet daily   Last Written Prescription Date:  5/8/20  Last Fill Quantity: 60,   # refills: 1  Last Office Visit : 5/8/20  Future Office visit:  10/5/20    Routing refill request to provider for review/approval because:  Too early for refill: 120 days/ 1 Rf sent to [facility] [place], [place] - [address] [address]

## 2020-08-07 ENCOUNTER — PATIENT OUTREACH (OUTPATIENT)
Dept: NURSING | Facility: CLINIC | Age: 75
End: 2020-08-07
Payer: MEDICARE

## 2020-08-07 ASSESSMENT — ACTIVITIES OF DAILY LIVING (ADL): DEPENDENT_IADLS:: INDEPENDENT

## 2020-08-07 NOTE — PROGRESS NOTES
Clinic Care Coordination Contact    Follow Up Progress Note      Assessment: BAKARI ELENA spoke with pt regarding her recent discharge from home care. She shared that she has been having some issues with her left leg. Overall, she is managing this well and is doing PT exercises.    Her  continues to live in long-term care. He is doing well there though he has shown some confusion. He says occasionally that he is planning to come back home and he is all better. Though his nurses report that he continues to struggle with balance, walking, and having falls. Pt is only able to have brief, outdoor visits with her .    Pt is living in a town home that she moved into this summer. It is going well and she is installing grab bars and rails to help her safely navigate the home and outside.    Plan: No further outreaches will be made at this time unless a new referral is made or a change in the pt's status occurs. Patient was provided with BAKARI ELENA contact information and encouraged to call with any questions or concerns.    MARYAN Ng, [facility]  Clinic Care Coordinator  [facility]  [phone]  [email or web address]

## 2020-08-07 NOTE — LETTER
[place] CARE COORDINATION    August 7, 2020    Chasity Hodgson  [address] [place] [address]      Dear Chasity,    I am a clinic care coordinator who works with Shola Benoit MD, MD at [facility]. I wanted to thank you for spending the time to talk with me.  Below is a description of clinic care coordination and how I can further assist you.      The clinic care coordination team is made up of a registered nurse,  and community health worker who understand the health care system. The goal of clinic care coordination is to help you manage your health and improve access to the health care system in the most efficient manner. The team can assist you in meeting your health care goals by providing education, coordinating services, strengthening the communication among your providers and supporting you with any resource needs.    Please feel free to contact me at [phone] with any questions or concerns. We are focused on providing you with the highest-quality healthcare experience possible and that all starts with you.     Sincerely,     MARYAN Ng, [facility]  Clinic Care Coordinator  [facility]  [phone]  [email or web address]

## 2020-08-11 ENCOUNTER — TRANSFERRED RECORDS (OUTPATIENT)
Dept: HEALTH INFORMATION MANAGEMENT | Facility: CLINIC | Age: 75
End: 2020-08-11

## 2020-09-25 ENCOUNTER — TELEPHONE (OUTPATIENT)
Dept: FAMILY MEDICINE | Facility: CLINIC | Age: 75
End: 2020-09-25

## 2020-09-25 DIAGNOSIS — K21.9 GASTROESOPHAGEAL REFLUX DISEASE WITHOUT ESOPHAGITIS: Primary | ICD-10-CM

## 2020-09-27 RX ORDER — FAMOTIDINE 20 MG/1
40 TABLET, FILM COATED ORAL PRN
Qty: 180 TABLET | Refills: 3 | Status: SHIPPED | OUTPATIENT
Start: 2020-09-27 | End: 2022-05-17

## 2020-09-30 DIAGNOSIS — N18.30 CKD (CHRONIC KIDNEY DISEASE) STAGE 3, GFR 30-59 ML/MIN (H): ICD-10-CM

## 2020-09-30 DIAGNOSIS — E11.9 TYPE 2 DIABETES, HBA1C GOAL < 7% (H): ICD-10-CM

## 2020-09-30 DIAGNOSIS — R60.0 LOWER EXTREMITY EDEMA: ICD-10-CM

## 2020-10-01 RX ORDER — FUROSEMIDE 20 MG
TABLET ORAL
Qty: 135 TABLET | Refills: 0 | Status: SHIPPED | OUTPATIENT
Start: 2020-10-01 | End: 2021-09-22

## 2020-10-01 RX ORDER — FLASH GLUCOSE SENSOR
KIT MISCELLANEOUS
Qty: 2 EACH | Refills: 2 | Status: SHIPPED | OUTPATIENT
Start: 2020-10-01 | End: 2021-09-29

## 2020-10-05 ENCOUNTER — OFFICE VISIT (OUTPATIENT)
Dept: DERMATOLOGY | Facility: CLINIC | Age: 75
End: 2020-10-05
Payer: MEDICARE

## 2020-10-05 VITALS — DIASTOLIC BLOOD PRESSURE: 72 MMHG | HEART RATE: 54 BPM | SYSTOLIC BLOOD PRESSURE: 161 MMHG

## 2020-10-05 DIAGNOSIS — L64.9 ANDROGENETIC ALOPECIA: ICD-10-CM

## 2020-10-05 DIAGNOSIS — L64.9 ANDROGENETIC ALOPECIA: Primary | ICD-10-CM

## 2020-10-05 DIAGNOSIS — L85.3 XEROSIS CUTIS: ICD-10-CM

## 2020-10-05 LAB
ALBUMIN SERPL-MCNC: 3.6 G/DL (ref 3.4–5)
ALP SERPL-CCNC: 42 U/L (ref 40–150)
ALT SERPL W P-5'-P-CCNC: 21 U/L (ref 0–50)
ANION GAP SERPL CALCULATED.3IONS-SCNC: 6 MMOL/L (ref 3–14)
AST SERPL W P-5'-P-CCNC: 24 U/L (ref 0–45)
BILIRUB SERPL-MCNC: 0.4 MG/DL (ref 0.2–1.3)
BUN SERPL-MCNC: 31 MG/DL (ref 7–30)
CALCIUM SERPL-MCNC: 9.1 MG/DL (ref 8.5–10.1)
CHLORIDE SERPL-SCNC: 107 MMOL/L (ref 94–109)
CO2 SERPL-SCNC: 27 MMOL/L (ref 20–32)
CREAT SERPL-MCNC: 1.2 MG/DL (ref 0.52–1.04)
GFR SERPL CREATININE-BSD FRML MDRD: 44 ML/MIN/{1.73_M2}
GLUCOSE SERPL-MCNC: 114 MG/DL (ref 70–99)
POTASSIUM SERPL-SCNC: 4.6 MMOL/L (ref 3.4–5.3)
PROT SERPL-MCNC: 6.9 G/DL (ref 6.8–8.8)
SODIUM SERPL-SCNC: 140 MMOL/L (ref 133–144)

## 2020-10-05 PROCEDURE — 36415 COLL VENOUS BLD VENIPUNCTURE: CPT | Performed by: PATHOLOGY

## 2020-10-05 PROCEDURE — 80053 COMPREHEN METABOLIC PANEL: CPT | Performed by: PATHOLOGY

## 2020-10-05 PROCEDURE — 99213 OFFICE O/P EST LOW 20 MIN: CPT | Mod: GC | Performed by: DERMATOLOGY

## 2020-10-05 ASSESSMENT — PAIN SCALES - GENERAL: PAINLEVEL: NO PAIN (0)

## 2020-10-05 NOTE — NURSING NOTE
Dermatology Rooming Note    Chasity Hodgson's goals for this visit include:   Chief Complaint   Patient presents with     Hair Loss     Sammie is here today for a hair loss follow up      ARLEN Ceja

## 2020-10-05 NOTE — LETTER
10/5/2020       RE: Chasity Hodgson  [id number]  [place] [address]     Dear Colleague,    Thank you for referring your patient, Chasity Hodgson, to the [facility] DERMATOLOGY CLINIC [place] at [facility]. Please see a copy of my visit note below.    [facility]    Dermatology Problem List:  1. Female pattern hair loss  - 1/2 tab of minoxidil 2.5 mg daily, laser comb up to 3x weekly and free and clear shampoo  - dry scalp resolved with olive oil and OTC hydrocortisone  - Continue to use laser headband or comb at least 3 times per week   - Continue to shampoo hair daily with free and clear shampoo  - Labs today: CMP    2. Tinea corporis or candida/yeast infection - resolved  -  nystatin powder, OTC lotrimin cream   3. Rosacea - stable  - Vanicream  4. Venous stasis dermatitis - stable  - Vanicream  5. Allergic contact dermatitis  - uses fragrance-free products  - patch testing revealed mild reaction  to [place]als[place] of [place], fragrance mix, and a strong reaction to paraphenylenediamine.   6. Xerosis cutis  -Recommend continuing use of a gentle, fragrance-free emollient based moisturizer such as Vanicream or CeraVe.   7.Elevated blood pressure reading, repeat outside of this clinic with primary care         Encounter Date: Oct 5, 2020    CC:   Chief Complaint   Patient presents with     Hair Loss     Sammie is here today for a hair loss follow up        History of Present Illness:  Ms. Chasity Hodgson is a 74 year old female who presents as a follow-up patient for hair loss.     - Last seen in clinic on 5/8/20  - Current tx: 1/2 tab of minoxidil 2.5 mg daily, laser headband twice weekly, free and clear shampoo  - Scalp pain: no  - Scalp burning: no  - Scalp itching: mild  - Eyebrow or eyelash changes: no  - Nail changes: no  - Overall course: improved    Tolerating the minoxidil well with no significant decreases in blood pressure. Has  noticed more tightness and dry itchy skin, including the scalp, possibly due to decreased humidity with the change of weather. No other concerns today and in general state of health.     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)     Anemia due to blood loss, acute     ACP (advance care planning)     Postsurgical hypothyroidism     Type 2 diabetes, HbA1c goal < 7% (H)     Gastroparesis     Papillary carcinoma of thyroid (H)     Alopecia     Pulmonary nodule     Esophageal reflux     Dermatitis     Acne rosacea     Diabetic gastroparesis (H)     Poliomyelitis     Left knee pain     Carotid stenosis     Right shoulder pain     Type 2 diabetes mellitus with other specified complication (H)     Insufficiency, arterial, peripheral (H)     Allergic dermatitis due to other chemical product     Normocytic anemia     Morbid obesity (H)     Mild major depression (H)     CKD (chronic kidney disease) stage 3, GFR 30-59 ml/min     Renal artery stenosis (H)     Past Medical History:   Diagnosis Date     Abdominal adhesions 1984, 96,99    s/p lysis     Allergic rhinitis, cause unspecified      Carotid stenosis      CPAP (continuous positive airway pressure) dependence      Diabetic gastroparesis (H)      Diet-controlled type 2 diabetes mellitus (H)      DVT of axillary vein, acute right (H)      Fibromyalgia      Gastro-oesophageal reflux disease      Hernia of unspecified site of abdominal cavity without mention of obstruction or gangrene     bilateral     HTN (hypertension)      Hypertriglyceridemia      Obstructive sleep apnea      ANNETTE (obstructive sleep apnea)      Papillary carcinoma of thyroid (H)     s/p thyroidectomy - Ruegemer     PE  (pulmonary embolism)      Poliomyelitis     poor circulation right leg     Postsurgical hypothyroidism     s/p papillary thryoid cancer - Ruegemer     Pulmonary embolism (H)      Rosacea      S/P carpal tunnel release     bilateral     S/P hardware removal 01/2014    still with lingering foot pain     S/P shoulder surgery     bilateral     Septic joint (H)     right knee     Venous insufficiency      Venous thrombosis 1999    right axillary vein     Past Surgical History:   Procedure Laterality Date     AMPUTATE TOE(S)  3/15/2012    Procedure:AMPUTATE TOE(S); Surgeon:SUKHDEEP METZ; Location: OR     APPENDECTOMY  1972     ARTHRODESIS FOOT  3/15/2012    Procedure:ARTHRODESIS FOOT; RIGHT TRIPLE ARTHRODESIS, FIFTH TOE AMPUTATION, LATERAL LIGAMENT RECONSTRUCTION, TENDON TRANSFER AND RELEASE [MINI C-ARM, ARTHREX 4.5 AND 6.7 STAPLES, BIOCOMPOSITE TENODESIS SCREWS]; Surgeon:SUKHDEEP METZ; Location: OR      FREEING BOWEL ADHESION,ENTEROLYSIS      1986, 1996, 1999     C TOTAL ABDOM HYSTERECTOMY  1980    + BSO     CHOLECYSTECTOMY       COLOSTOMY  2/7/2012    Procedure:COLOSTOMY; CREATION OF SIGMOID COLOSTOMY AND EXTENSIVE  LYSIS OF ADHESIONS; Surgeon:MONTSERRAT BENDER; Location: OR     GI SURGERY      weakened rectal sphincter with artificial stimulator     LAPAROTOMY, LYSIS ADHESIONS, COMBINED  2/7/2012    Procedure:COMBINED LAPAROTOMY, LYSIS ADHESIONS; Surgeon:MONTSERRAT BENDER; Location: OR     RELEASE TENDON FOOT  3/15/2012    Procedure:RELEASE TENDON FOOT; Surgeon:SUKHDEEP METZ; Location: OR     REMOVE HARDWARE FOOT  12/13/2012    Procedure: REMOVE HARDWARE FOOT;  RIGHT FOOT REMOVAL OF HARDWARE;  Surgeon: Sukhdeep Metz MD;  Location: [facility] NONSPECIFIC PROCEDURE      throidectomy       Social History:   reports that she has never smoked. She has never used smokeless tobacco. She reports previous alcohol use. She reports that she does not use drugs.    Family  History:  Family History   Problem Relation Age of Onset     Arthritis Mother      Hypertension Mother      Cerebrovascular Disease Mother      Obesity Mother      Heart Disease Mother         MI's     Hypertension Father      Respiratory Father         Adult RDS     Diabetes Father         adult     Arthritis Sister      Cancer Sister      Arthritis Sister      Hypertension Sister      Cancer Sister         colon polup     Heart Disease Brother         MI at 54     Hypertension Sister      Lipids Brother      Hypertension Brother      Lipids Sister      Obesity Sister      Obesity Maternal Grandmother      Skin Cancer Maternal Grandmother         skin cancer unknown     Cancer Maternal Grandmother         unknown skin cancer on face       Medications:  Current Outpatient Medications   Medication Sig Dispense Refill     albuterol (PROAIR HFA/PROVENTIL HFA/VENTOLIN HFA) 108 (90 Base) MCG/ACT inhaler Inhale 2-4 puffs into the lungs every 2 hours as needed for shortness of breath / dyspnea 1 Inhaler 1     amLODIPine (NORVASC) 2.5 MG tablet Take 1 tablet (2.5 mg) by mouth 2 times daily 180 tablet 3     aspirin (SB LOW DOSE ASA EC) 81 MG EC tablet Take 81 mg by mouth daily       atenolol (TENORMIN) 50 MG tablet Take 1 tablet (50 mg) by mouth 2 times daily 180 tablet 3     Azelastine HCl (ASTEPRO NA)        BIOTIN PO Take 1,000 mcg by mouth       Cholecalciferol (VITAMIN D-3 PO) Take 2 tablets by mouth       clotrimazole (LOTRIMIN) 1 % cream Apply topically daily       Continuous Blood Gluc Sensor (ProximusSTYLE BRANDY 14 DAY SENSOR) [facility] [place]Y 1 SENSOR AND CHANGE EVERY 14 DAYS AS DIRECTED 2 each 2     diphenhydrAMINE-acetaminophen (TYLENOL PM)  MG tablet Take 1 tablet by mouth At Bedtime Reported on 3/20/2017       ezetimibe (ZETIA) 10 MG tablet Take 1 tablet (10 mg) by mouth daily 90 tablet 3     famotidine (PEPCID) 20 MG tablet Take 2 tablets (40 mg) by mouth as needed 180 tablet 3     fenofibrate (TRICOR) 145 MG  tablet TAKE ONE TABLET BY MOUTH ONCE DAILY 90 tablet 3     ferrous sulfate (IRON) 325 (65 FE) MG tablet Take 325 mg by mouth daily (with breakfast)       fexofenadine (ALLEGRA) 180 MG tablet Take 180 mg by mouth daily. 120 0     fluticasone (FLONASE) 50 MCG/ACT spray Spray 2 sprays in nostril daily 2 sprays in each nostril qd 1 Bottle 0     furosemide (LASIX) 20 MG tablet TAKE ONE TABLET (20MG) BY MOUTH ONCE DAILY; TAKE AN ADDITIONAL TABLET (20MG) IN THE AFTERNOON ON MONDAY, WEDNESDAY AND FRIDAY 135 tablet 0     gabapentin (NEURONTIN) 100 MG capsule Take 1 capsule (100 mg) by mouth every evening as needed 90 capsule 3     insulin glargine U-300 (TOUJEO) 300 UNIT/ML (1 units dial) pen Inject 8 Units Subcutaneous every morning 15 mL 1     levothyroxine (SYNTHROID) 112 MCG tablet Take 112 mcg by mouth daily Take 112 mcg daily except for Friday takes only 56 mcg.  Brand name Synthroid       lisinopril (ZESTRIL) 40 MG tablet TAKE ONE TABLET BY MOUTH ONCE DAILY 90 tablet 0     metoclopramide (REGLAN) 5 MG tablet Take 1-2 tablets (5-10 mg) by mouth 2 times daily as needed a 120 tablet 0     minoxidil (LONITEN) 2.5 MG tablet One half tablet daily 60 tablet 1     MULTIVITAMINS OR TABS ONE DAILY 100 3     nitroGLYcerin (NITROSTAT) 0.4 MG sublingual tablet Place 1 tablet (0.4 mg) under the tongue every 5 minutes as needed for chest pain (no more than 3 in one hour; after 3rd, call 911.) 25 tablet 3     nystatin (MYCOSTATIN) cream Apply topically daily as needed        nystatin-triamcinolone (MYCOLOG II) cream Apply topically daily as needed       ondansetron (ZOFRAN) 4 MG tablet Take 1 tablet by mouth every 6 hours as needed Reported on 3/20/2017       order for DME Equipment being ordered: Compression stockings - Knee High; 20-30 mmHg compression - note would like adhesive band to keep the stocking from sliding down 3 each 0     order for DME Donut Pillow 1 each 0     order for DME Equipment being ordered: Oral appliance for  sleep apnea 1 Units 0     pantoprazole (PROTONIX) 40 MG EC tablet Take 40 mg by mouth 2 times daily       sitagliptin (JANUVIA) 50 MG tablet Take 1 tablet (50 mg) by mouth daily In the morning        Allergies   Allergen Reactions     Nsaids Difficulty breathing     Increased creatinine     Toradol Difficulty breathing     Shortness of breath     Celecoxib Itching and Rash     Codeine Itching     With higher doses     Crestor [Rosuvastatin] Muscle Pain (Myalgia)     No Clinical Screening - See Comments Itching     Fragrance     Vioxx Other (See Comments)     Heart races     Conjugated Estrogens Rash     Sulfa Drugs Rash       Review of Systems:  -Constitutional: The patient denies persistent fatigue, fevers, chills, unintended weight loss, and night sweats.  -HEENT: Patient denies nonhealing oral sores.  -Skin: As above in HPI. No additional skin concerns.    Physical exam:  - /72  GEN: Well developed, well-nourished, in no acute distress, in a pleasant mood.    SKIN: Sun-exposed skin, which includes the head/face, neck, both arms, digits, and/or nails was examined.   Ceron type: I  - Darkly pigmented terminal hair fibers noted diffusely on the scalp  - No perifollicular erythema  - No scaling of the scalp   - Hair pull test negative  - Eyelashes and eyebrows normal density  - Nails no pitting or dystrophy  - In comparison to prior photographs, improved overall  - Generalized xerosis of skin                            Impression/Plan:  1. Female pattern hair loss - continuing to improve  Responding well to oral minoxidil with no reported side effects. Will continue to avoid adding new topical therapies due to history of allergic contact dermatitis.   - Continue 1/2 tab of minoxidil 2.5 mg daily  - Continue to use laser headband or comb at least 3 times per week   - Continue to shampoo hair daily with free and clear shampoo  - Labs today: CMP    2. Xerosis cutis  -Recommend continuing use of a gentle,  fragrance-free emollient based moisturizer such as Vanicream or CeraVe.     3. Elevated blood pressure reading, repeat outside of this clinic with primary care    Follow-up in 4 months, earlier for new or changing lesions.     Dr. Meyer staffed the patient.    Staff Involved:  Resident(Kaelyn Thao DO)/Staff(as above)    Patient was seen and examined with the dermatology resident. I agree with the history, review of systems, physical examination, assessments and plan.    Renetta Meyer MD  Professor and  Chair  Department of Dermatology  [facility]

## 2020-10-05 NOTE — PATIENT INSTRUCTIONS
Continue taking 1/2 a pill of the 2.5 mg minoxidil daily.  Continue using the laser hair comb or headband 3 times per week.

## 2020-10-05 NOTE — PROGRESS NOTES
[facility]    Dermatology Problem List:  1. Female pattern hair loss  - 1/2 tab of minoxidil 2.5 mg daily, laser comb up to 3x weekly and free and clear shampoo  - dry scalp resolved with olive oil and OTC hydrocortisone  - Continue to use laser headband or comb at least 3 times per week   - Continue to shampoo hair daily with free and clear shampoo  - Labs today: CMP    2. Tinea corporis or candida/yeast infection - resolved  -  nystatin powder, OTC lotrimin cream   3. Rosacea - stable  - Vanicream  4. Venous stasis dermatitis - stable  - Vanicream  5. Allergic contact dermatitis  - uses fragrance-free products  - patch testing revealed mild reaction  to [place]als[place] of [place], fragrance mix, and a strong reaction to paraphenylenediamine.   6. Xerosis cutis  -Recommend continuing use of a gentle, fragrance-free emollient based moisturizer such as Vanicream or CeraVe.   7.Elevated blood pressure reading, repeat outside of this clinic with primary care         Encounter Date: Oct 5, 2020    CC:   Chief Complaint   Patient presents with     Hair Loss     Sammie is here today for a hair loss follow up        History of Present Illness:  Ms. Chasity Hodgson is a 74 year old female who presents as a follow-up patient for hair loss.     - Last seen in clinic on 5/8/20  - Current tx: 1/2 tab of minoxidil 2.5 mg daily, laser headband twice weekly, free and clear shampoo  - Scalp pain: no  - Scalp burning: no  - Scalp itching: mild  - Eyebrow or eyelash changes: no  - Nail changes: no  - Overall course: improved    Tolerating the minoxidil well with no significant decreases in blood pressure. Has noticed more tightness and dry itchy skin, including the scalp, possibly due to decreased humidity with the change of weather. No other concerns today and in general state of health.     Past Medical History:   Patient Active Problem List   Diagnosis     Low back pain     Mixed hyperlipidemia     Benign  essential hypertension     Fibromyalgia     Allergic rhinitis     ANNETTE (obstructive sleep apnea)     Cavovarus deformity of foot     History of DVT (deep vein thrombosis)     Hypokalemia     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)     Anemia due to blood loss, acute     ACP (advance care planning)     Postsurgical hypothyroidism     Type 2 diabetes, HbA1c goal < 7% (H)     Gastroparesis     Papillary carcinoma of thyroid (H)     Alopecia     Pulmonary nodule     Esophageal reflux     Dermatitis     Acne rosacea     Diabetic gastroparesis (H)     Poliomyelitis     Left knee pain     Carotid stenosis     Right shoulder pain     Type 2 diabetes mellitus with other specified complication (H)     Insufficiency, arterial, peripheral (H)     Allergic dermatitis due to other chemical product     Normocytic anemia     Morbid obesity (H)     Mild major depression (H)     CKD (chronic kidney disease) stage 3, GFR 30-59 ml/min     Renal artery stenosis (H)     Past Medical History:   Diagnosis Date     Abdominal adhesions 1984, 96,99    s/p lysis     Allergic rhinitis, cause unspecified      Carotid stenosis      CPAP (continuous positive airway pressure) dependence      Diabetic gastroparesis (H)      Diet-controlled type 2 diabetes mellitus (H)      DVT of axillary vein, acute right (H)      Fibromyalgia      Gastro-oesophageal reflux disease      Hernia of unspecified site of abdominal cavity without mention of obstruction or gangrene     bilateral     HTN (hypertension)      Hypertriglyceridemia      Obstructive sleep apnea      ANNETTE (obstructive sleep apnea)      Papillary carcinoma of thyroid (H)     s/p thyroidectomy - Ruegemer     PE (pulmonary embolism)      Poliomyelitis     poor circulation right leg     Postsurgical hypothyroidism     s/p papillary thryoid cancer - Ruegemer     Pulmonary embolism (H)      Rosacea      S/P carpal tunnel release     bilateral     S/P hardware removal 01/2014    still with lingering foot pain     S/P  shoulder surgery     bilateral     Septic joint (H)     right knee     Venous insufficiency      Venous thrombosis 1999    right axillary vein     Past Surgical History:   Procedure Laterality Date     AMPUTATE TOE(S)  3/15/2012    Procedure:AMPUTATE TOE(S); Surgeon:SUKHDEEP METZ; Location: OR     APPENDECTOMY  1972     ARTHRODESIS FOOT  3/15/2012    Procedure:ARTHRODESIS FOOT; RIGHT TRIPLE ARTHRODESIS, FIFTH TOE AMPUTATION, LATERAL LIGAMENT RECONSTRUCTION, TENDON TRANSFER AND RELEASE [MINI C-ARM, ARTHREX 4.5 AND 6.7 STAPLES, BIOCOMPOSITE TENODESIS SCREWS]; Surgeon:SUKHDEEP METZ; Location: OR      FREEING BOWEL ADHESION,ENTEROLYSIS      1986, 1996, 1999     C TOTAL ABDOM HYSTERECTOMY  1980    + BSO     CHOLECYSTECTOMY       COLOSTOMY  2/7/2012    Procedure:COLOSTOMY; CREATION OF SIGMOID COLOSTOMY AND EXTENSIVE  LYSIS OF ADHESIONS; Surgeon:MONTSERRAT BENDER; Location: OR     GI SURGERY      weakened rectal sphincter with artificial stimulator     LAPAROTOMY, LYSIS ADHESIONS, COMBINED  2/7/2012    Procedure:COMBINED LAPAROTOMY, LYSIS ADHESIONS; Surgeon:MONTSERRAT BENDER; Location: OR     RELEASE TENDON FOOT  3/15/2012    Procedure:RELEASE TENDON FOOT; Surgeon:SUKHDEEP METZ; Location: OR     REMOVE HARDWARE FOOT  12/13/2012    Procedure: REMOVE HARDWARE FOOT;  RIGHT FOOT REMOVAL OF HARDWARE;  Surgeon: Sukhdeep Mezt MD;  Location:  OR     RUST NONSPECIFIC PROCEDURE      throidectomy       Social History:   reports that she has never smoked. She has never used smokeless tobacco. She reports previous alcohol use. She reports that she does not use drugs.    Family History:  Family History   Problem Relation Age of Onset     Arthritis Mother      Hypertension Mother      Cerebrovascular Disease Mother      Obesity Mother      Heart Disease Mother         MI's     Hypertension Father      Respiratory Father         Adult RDS     Diabetes Father         adult      Arthritis Sister      Cancer Sister      Arthritis Sister      Hypertension Sister      Cancer Sister         colon polup     Heart Disease Brother         MI at 54     Hypertension Sister      Lipids Brother      Hypertension Brother      Lipids Sister      Obesity Sister      Obesity Maternal Grandmother      Skin Cancer Maternal Grandmother         skin cancer unknown     Cancer Maternal Grandmother         unknown skin cancer on face       Medications:  Current Outpatient Medications   Medication Sig Dispense Refill     albuterol (PROAIR HFA/PROVENTIL HFA/VENTOLIN HFA) 108 (90 Base) MCG/ACT inhaler Inhale 2-4 puffs into the lungs every 2 hours as needed for shortness of breath / dyspnea 1 Inhaler 1     amLODIPine (NORVASC) 2.5 MG tablet Take 1 tablet (2.5 mg) by mouth 2 times daily 180 tablet 3     aspirin (SB LOW DOSE ASA EC) 81 MG EC tablet Take 81 mg by mouth daily       atenolol (TENORMIN) 50 MG tablet Take 1 tablet (50 mg) by mouth 2 times daily 180 tablet 3     Azelastine HCl (ASTEPRO NA)        BIOTIN PO Take 1,000 mcg by mouth       Cholecalciferol (VITAMIN D-3 PO) Take 2 tablets by mouth       clotrimazole (LOTRIMIN) 1 % cream Apply topically daily       Continuous Blood Gluc Sensor (FREESTYLE BRANDY 14 DAY SENSOR) [facility] [place] APPLY 1 SENSOR AND CHANGE EVERY 14 DAYS AS DIRECTED 2 each 2     diphenhydrAMINE-acetaminophen (TYLENOL PM)  MG tablet Take 1 tablet by mouth At Bedtime Reported on 3/20/2017       ezetimibe (ZETIA) 10 MG tablet Take 1 tablet (10 mg) by mouth daily 90 tablet 3     famotidine (PEPCID) 20 MG tablet Take 2 tablets (40 mg) by mouth as needed 180 tablet 3     fenofibrate (TRICOR) 145 MG tablet TAKE ONE TABLET BY MOUTH ONCE DAILY 90 tablet 3     ferrous sulfate (IRON) 325 (65 FE) MG tablet Take 325 mg by mouth daily (with breakfast)       fexofenadine (ALLEGRA) 180 MG tablet Take 180 mg by mouth daily. 120 0     fluticasone (FLONASE) 50 MCG/ACT spray Spray 2 sprays in nostril daily 2  sprays in each nostril qd 1 Bottle 0     furosemide (LASIX) 20 MG tablet TAKE ONE TABLET (20MG) BY MOUTH ONCE DAILY; TAKE AN ADDITIONAL TABLET (20MG) IN THE AFTERNOON ON MONDAY, WEDNESDAY AND FRIDAY 135 tablet 0     gabapentin (NEURONTIN) 100 MG capsule Take 1 capsule (100 mg) by mouth every evening as needed 90 capsule 3     insulin glargine U-300 (TOUJEO) 300 UNIT/ML (1 units dial) pen Inject 8 Units Subcutaneous every morning 15 mL 1     levothyroxine (SYNTHROID) 112 MCG tablet Take 112 mcg by mouth daily Take 112 mcg daily except for Friday takes only 56 mcg.  Brand name Synthroid       lisinopril (ZESTRIL) 40 MG tablet TAKE ONE TABLET BY MOUTH ONCE DAILY 90 tablet 0     metoclopramide (REGLAN) 5 MG tablet Take 1-2 tablets (5-10 mg) by mouth 2 times daily as needed a 120 tablet 0     minoxidil (LONITEN) 2.5 MG tablet One half tablet daily 60 tablet 1     MULTIVITAMINS OR TABS ONE DAILY 100 3     nitroGLYcerin (NITROSTAT) 0.4 MG sublingual tablet Place 1 tablet (0.4 mg) under the tongue every 5 minutes as needed for chest pain (no more than 3 in one hour; after 3rd, call 911.) 25 tablet 3     nystatin (MYCOSTATIN) cream Apply topically daily as needed        nystatin-triamcinolone (MYCOLOG II) cream Apply topically daily as needed       ondansetron (ZOFRAN) 4 MG tablet Take 1 tablet by mouth every 6 hours as needed Reported on 3/20/2017       order for DME Equipment being ordered: Compression stockings - Knee High; 20-30 mmHg compression - note would like adhesive band to keep the stocking from sliding down 3 each 0     order for DME Donut Pillow 1 each 0     order for DME Equipment being ordered: Oral appliance for sleep apnea 1 Units 0     pantoprazole (PROTONIX) 40 MG EC tablet Take 40 mg by mouth 2 times daily       sitagliptin (JANUVIA) 50 MG tablet Take 1 tablet (50 mg) by mouth daily In the morning        Allergies   Allergen Reactions     Nsaids Difficulty breathing     Increased creatinine     Toradol  Difficulty breathing     Shortness of breath     Celecoxib Itching and Rash     Codeine Itching     With higher doses     Crestor [Rosuvastatin] Muscle Pain (Myalgia)     No Clinical Screening - See Comments Itching     Fragrance     Vioxx Other (See Comments)     Heart races     Conjugated Estrogens Rash     Sulfa Drugs Rash       Review of Systems:  -Constitutional: The patient denies persistent fatigue, fevers, chills, unintended weight loss, and night sweats.  -HEENT: Patient denies nonhealing oral sores.  -Skin: As above in HPI. No additional skin concerns.    Physical exam:  - /72  GEN: Well developed, well-nourished, in no acute distress, in a pleasant mood.    SKIN: Sun-exposed skin, which includes the head/face, neck, both arms, digits, and/or nails was examined.   Ceron type: I  - Darkly pigmented terminal hair fibers noted diffusely on the scalp  - No perifollicular erythema  - No scaling of the scalp   - Hair pull test negative  - Eyelashes and eyebrows normal density  - Nails no pitting or dystrophy  - In comparison to prior photographs, improved overall  - Generalized xerosis of skin                            Impression/Plan:  1. Female pattern hair loss - continuing to improve  Responding well to oral minoxidil with no reported side effects. Will continue to avoid adding new topical therapies due to history of allergic contact dermatitis.   - Continue 1/2 tab of minoxidil 2.5 mg daily  - Continue to use laser headband or comb at least 3 times per week   - Continue to shampoo hair daily with free and clear shampoo  - Labs today: CMP    2. Xerosis cutis  -Recommend continuing use of a gentle, fragrance-free emollient based moisturizer such as Vanicream or CeraVe.     3. Elevated blood pressure reading, repeat outside of this clinic with primary care    Follow-up in 4 months, earlier for new or changing lesions.     Dr. Meyer staffed the patient.    Staff Involved:  Resident(Kaelyn  DO Master)/Staff(as above)    Patient was seen and examined with the dermatology resident. I agree with the history, review of systems, physical examination, assessments and plan.    Renetta Meyer MD  Professor and  Chair  Department of Dermatology  [facility]

## 2020-10-11 DIAGNOSIS — R89.9 ABNORMAL LABORATORY TEST RESULT: Primary | ICD-10-CM

## 2020-10-30 ENCOUNTER — TRANSFERRED RECORDS (OUTPATIENT)
Dept: HEALTH INFORMATION MANAGEMENT | Facility: CLINIC | Age: 75
End: 2020-10-30

## 2020-11-04 ENCOUNTER — OFFICE VISIT (OUTPATIENT)
Dept: FAMILY MEDICINE | Facility: CLINIC | Age: 75
End: 2020-11-04
Payer: OTHER MISCELLANEOUS

## 2020-11-04 VITALS
BODY MASS INDEX: 35.14 KG/M2 | HEIGHT: 60 IN | OXYGEN SATURATION: 100 % | WEIGHT: 179 LBS | HEART RATE: 56 BPM | TEMPERATURE: 96.6 F | DIASTOLIC BLOOD PRESSURE: 72 MMHG | SYSTOLIC BLOOD PRESSURE: 141 MMHG

## 2020-11-04 DIAGNOSIS — Z23 NEED FOR PROPHYLACTIC VACCINATION AND INOCULATION AGAINST INFLUENZA: ICD-10-CM

## 2020-11-04 DIAGNOSIS — I10 BENIGN ESSENTIAL HYPERTENSION: ICD-10-CM

## 2020-11-04 DIAGNOSIS — R89.9 ABNORMAL LABORATORY TEST RESULT: ICD-10-CM

## 2020-11-04 DIAGNOSIS — E78.2 MIXED HYPERLIPIDEMIA: ICD-10-CM

## 2020-11-04 DIAGNOSIS — I10 ESSENTIAL HYPERTENSION: ICD-10-CM

## 2020-11-04 DIAGNOSIS — Z01.818 PREOP GENERAL PHYSICAL EXAM: Primary | ICD-10-CM

## 2020-11-04 DIAGNOSIS — N18.31 STAGE 3A CHRONIC KIDNEY DISEASE (H): ICD-10-CM

## 2020-11-04 DIAGNOSIS — G47.33 OSA (OBSTRUCTIVE SLEEP APNEA): ICD-10-CM

## 2020-11-04 DIAGNOSIS — C73 PAPILLARY CARCINOMA OF THYROID (H): ICD-10-CM

## 2020-11-04 DIAGNOSIS — D64.9 NORMOCYTIC ANEMIA: ICD-10-CM

## 2020-11-04 DIAGNOSIS — R82.90 NONSPECIFIC FINDING ON EXAMINATION OF URINE: ICD-10-CM

## 2020-11-04 DIAGNOSIS — Z79.4 TYPE 2 DIABETES MELLITUS WITH OTHER SPECIFIED COMPLICATION, WITH LONG-TERM CURRENT USE OF INSULIN (H): ICD-10-CM

## 2020-11-04 DIAGNOSIS — Z13.6 CARDIOVASCULAR SCREENING; LDL GOAL LESS THAN 130: ICD-10-CM

## 2020-11-04 DIAGNOSIS — E11.69 TYPE 2 DIABETES MELLITUS WITH OTHER SPECIFIED COMPLICATION, WITH LONG-TERM CURRENT USE OF INSULIN (H): ICD-10-CM

## 2020-11-04 DIAGNOSIS — F32.0 MILD MAJOR DEPRESSION (H): ICD-10-CM

## 2020-11-04 LAB
ALBUMIN UR-MCNC: NEGATIVE MG/DL
APPEARANCE UR: CLEAR
BACTERIA #/AREA URNS HPF: ABNORMAL /HPF
BILIRUB UR QL STRIP: NEGATIVE
COLOR UR AUTO: YELLOW
ERYTHROCYTE [DISTWIDTH] IN BLOOD BY AUTOMATED COUNT: 12.5 % (ref 10–15)
GLUCOSE UR STRIP-MCNC: NEGATIVE MG/DL
HCT VFR BLD AUTO: 34.8 % (ref 35–47)
HGB BLD-MCNC: 11.8 G/DL (ref 11.7–15.7)
HGB UR QL STRIP: NEGATIVE
KETONES UR STRIP-MCNC: NEGATIVE MG/DL
LEUKOCYTE ESTERASE UR QL STRIP: ABNORMAL
MCH RBC QN AUTO: 31.3 PG (ref 26.5–33)
MCHC RBC AUTO-ENTMCNC: 33.9 G/DL (ref 31.5–36.5)
MCV RBC AUTO: 92 FL (ref 78–100)
NITRATE UR QL: NEGATIVE
NON-SQ EPI CELLS #/AREA URNS LPF: ABNORMAL /LPF
PH UR STRIP: 5 PH (ref 5–7)
PLATELET # BLD AUTO: 263 10E9/L (ref 150–450)
RBC # BLD AUTO: 3.77 10E12/L (ref 3.8–5.2)
RBC #/AREA URNS AUTO: ABNORMAL /HPF
SOURCE: ABNORMAL
SP GR UR STRIP: 1.02 (ref 1–1.03)
UROBILINOGEN UR STRIP-ACNC: 0.2 EU/DL (ref 0.2–1)
WBC # BLD AUTO: 10.2 10E9/L (ref 4–11)
WBC #/AREA URNS AUTO: ABNORMAL /HPF

## 2020-11-04 PROCEDURE — 80069 RENAL FUNCTION PANEL: CPT | Performed by: DERMATOLOGY

## 2020-11-04 PROCEDURE — 81001 URINALYSIS AUTO W/SCOPE: CPT | Performed by: INTERNAL MEDICINE

## 2020-11-04 PROCEDURE — 90662 IIV NO PRSV INCREASED AG IM: CPT | Performed by: INTERNAL MEDICINE

## 2020-11-04 PROCEDURE — 85027 COMPLETE CBC AUTOMATED: CPT | Performed by: DERMATOLOGY

## 2020-11-04 PROCEDURE — 36415 COLL VENOUS BLD VENIPUNCTURE: CPT | Performed by: DERMATOLOGY

## 2020-11-04 PROCEDURE — 99215 OFFICE O/P EST HI 40 MIN: CPT | Mod: 25 | Performed by: INTERNAL MEDICINE

## 2020-11-04 PROCEDURE — 93000 ELECTROCARDIOGRAM COMPLETE: CPT | Performed by: INTERNAL MEDICINE

## 2020-11-04 PROCEDURE — 90471 IMMUNIZATION ADMIN: CPT | Performed by: INTERNAL MEDICINE

## 2020-11-04 PROCEDURE — 87086 URINE CULTURE/COLONY COUNT: CPT | Performed by: INTERNAL MEDICINE

## 2020-11-04 RX ORDER — EZETIMIBE 10 MG/1
10 TABLET ORAL DAILY
Qty: 90 TABLET | Refills: 3 | Status: SHIPPED | OUTPATIENT
Start: 2020-11-04 | End: 2022-01-05

## 2020-11-04 RX ORDER — ATENOLOL 50 MG/1
50 TABLET ORAL 2 TIMES DAILY
Qty: 180 TABLET | Refills: 3 | Status: SHIPPED | OUTPATIENT
Start: 2020-11-04 | End: 2022-01-05

## 2020-11-04 RX ORDER — FENOFIBRATE 145 MG/1
145 TABLET, COATED ORAL DAILY
Qty: 90 TABLET | Refills: 3 | Status: SHIPPED | OUTPATIENT
Start: 2020-11-04 | End: 2022-01-05

## 2020-11-04 RX ORDER — AMLODIPINE BESYLATE 2.5 MG/1
2.5 TABLET ORAL 2 TIMES DAILY
Qty: 180 TABLET | Refills: 3 | Status: SHIPPED | OUTPATIENT
Start: 2020-11-04 | End: 2021-08-02

## 2020-11-04 ASSESSMENT — MIFFLIN-ST. JEOR: SCORE: 1233.44

## 2020-11-04 NOTE — PATIENT INSTRUCTIONS
Z01.818) Preop general physical exam  (primary encounter diagnosis)  Comment: You are medically optimized for your upcoming surgery pending labs and EKG.  We will plan to hold lisinopril on day of surgery.  Hold aspirin for 1 week leading up to surgery.  With regards to insulin dosing, plan to take half of your usual dose of Lantus on the morning of surgery and monitor closely  Plan: **Basic metabolic panel FUTURE anytime, CBC         with platelets and differential, EKG 12-lead         complete w/read - Clinics             (F32.0) Mild major depression (H)  Comment: No acute concerns at this time  Plan:      (E11.69,  Z79.4) Type 2 diabetes mellitus with other specified complication, with long-term current use of insulin (H)  Comment: For diabetic management, we will plan to continue insulin.  Currently taking insulin glargine 8 units in the morning.  Will take only half, which would be 4 units, in the morning on the day of surgery  Plan:      (I73.9) Insufficiency, arterial, peripheral (H)  Comment: Continue to monitor  Plan:      (E66.01) Morbid obesity (H)  Comment: No acute concerns.  This will be monitored during surgery and postoperatively  Plan:      (N18.3) CKD (chronic kidney disease) stage 3, GFR 30-59 ml/min (H)  Comment: We will recheck renal function today  Plan:      (G47.33) ANNETTE (obstructive sleep apnea)  Comment: Continue use of CPAP  Plan:      (E89.0) Postsurgical hypothyroidism  Comment: Recheck thyroid with endocrinology  Plan:      (I10) Benign essential hypertension  Comment: Blood pressure is elevated today.  Will need to recheck before you leave.     Plan:       (G47.33) ANNETTE (obstructive sleep apnea)  Comment: continue ultrasound of CPAP  Plan:       (E78.2) Mixed hyperlipidemia  Comment: continue zetia  Plan: ezetimibe (ZETIA) 10 MG tablet            (Z13.6) CARDIOVASCULAR SCREENING; LDL GOAL LESS THAN 130  Comment:   Plan: fenofibrate (TRICOR) 145 MG tablet

## 2020-11-04 NOTE — PROGRESS NOTES
[address] [address]  Phone: [phone]  Primary Provider: Ana Benoit  Pre-op Performing Provider: ANA BENOIT    PREOPERATIVE EVALUATION:  Today's date: 11/4/2020    Chasity Hodgson is a 75 year old female who presents for a preoperative evaluation.    Surgical Information:  Surgery/Procedure: Left Shoulder Hemiarthroplasty  Surgery Location: [facility]        Surgeon: Kishore Ferrera MD  Surgery Date: 11/12/2020  Time of Surgery: 1:00 PM  Where patient plans to recover: At home with family and At a TCU (Transitional Care Unit)  Fax number for surgical facility: [phone]    Type of Anesthesia Anticipated: General    Subjective     HPI related to upcoming procedure: total shoulder replacement    Preop Questions 11/4/2020   1. Have you ever had a heart attack or stroke? No   2. Have you ever had surgery on your heart or blood vessels, such as a stent placement, a coronary artery bypass, or surgery on an artery in your head, neck, heart, or legs? No   3. Do you have chest pain with activity? No   4. Do you have a history of  heart failure? No   5. Do you currently have a cold, bronchitis or symptoms of other infection? No   6. Do you have a cough, shortness of breath, or wheezing? No   7. Do you or anyone in your family have previous history of blood clots? YES - history of deep vein thrombosis    8. Do you or does anyone in your family have a serious bleeding problem such as prolonged bleeding following surgeries or cuts? No   9. Have you ever had problems with anemia or been told to take iron pills? YES - currently taking oral iron   10. Have you had any abnormal blood loss such as black, tarry or bloody stools, or abnormal vaginal bleeding? No   11. Have you ever had a blood transfusion? YES -    11a. Have you ever had a transfusion reaction? No   12. Are you willing to have a blood transfusion if it is medically needed  before, during, or after your surgery? Yes   13. Have you or any of your relatives ever had problems with anesthesia? No   14. Do you have sleep apnea, excessive snoring or daytime drowsiness? YES - has CPAP, but doesn't use it regularly   14a. Do you have a CPAP machine? YES - doesn't use it   15. Do you have any artifical heart valves or other implanted medical devices like a pacemaker, defibrillator, or continuous glucose monitor? No   16. Do you have artificial joints? YES -    17. Are you allergic to latex? No       Health Care Directive:  Patient has a Health Care Directive on file      Preoperative Review of :   reviewed - no record of controlled substances prescribed.      Status of Chronic Conditions:  See problem list for active medical problems.  Problems all longstanding and stable, except as noted/documented.  See ROS for pertinent symptoms related to these conditions.      Review of Systems  Constitutional, neuro, ENT, endocrine - diabetes mellitus, hypothyroidism, pulmonary, cardiac, gastrointestinal - colostomy, genitourinary - wonders if she might have a urinary tract infection, musculoskeletal - also low back pain and right leg weakness - will be seeing spine clinc, integument and psychiatric systems are negative, except as otherwise noted.    Patient Active Problem List    Diagnosis Date Noted     Renal artery stenosis (H) 07/31/2020     Priority: Medium     CKD (chronic kidney disease) stage 3, GFR 30-59 ml/min 04/17/2019     Priority: Medium     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     Priority: Medium     Morbid obesity (H) 08/01/2018     Priority: Medium     Normocytic anemia 01/16/2017     Priority: Medium     Allergic dermatitis due to other chemical product 10/02/2016     Priority: Medium     Insufficiency, arterial, peripheral (H) 11/08/2015     Priority: Medium     Mild seen on CARMITA 11/2015 - right sided       Type 2 diabetes mellitus with other specified complication (H) 10/18/2015      Priority: Medium     Carotid stenosis 12/09/2014     Priority: Medium     Mild increased stenosis 50-69% left ICA       Right shoulder pain 12/09/2014     Priority: Medium     Left knee pain 11/03/2014     Priority: Medium     Poliomyelitis      Priority: Medium     poor circulation right leg       Diabetic gastroparesis (H)      Priority: Medium     Dermatitis 02/15/2014     Priority: Medium     Acne rosacea 02/15/2014     Priority: Medium     Esophageal reflux 01/23/2014     Priority: Medium     Had upper endoscopy - Dr. Pantoja 2013       Pulmonary nodule 09/06/2013     Priority: Medium     Alopecia 02/09/2013     Priority: Medium     Problem list name updated by automated process. Provider to review       Papillary carcinoma of thyroid (H)      Priority: Medium     s/p thyroidectomy - Ruegemer       Gastroparesis 11/12/2012     Priority: Medium     Postsurgical hypothyroidism      Priority: Medium     [facility]  Concern for possible recurrence 03/2014       Type 2 diabetes, HbA1c goal < 7% (H)      Priority: Medium     ACP (advance care planning) 09/21/2012     Priority: Medium     Discussed advance care planning with patient; information given to patient to review. 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 Provider to review       ANNETTE (obstructive sleep apnea)      Priority: Medium     Mixed hyperlipidemia 10/31/2010     Priority: Medium     Low back pain 07/15/2009     Priority: Medium      Past  Medical History:   Diagnosis Date     Abdominal adhesions 1984, 96,99    s/p lysis     Allergic rhinitis, cause unspecified      Carotid stenosis      CPAP (continuous positive airway pressure) dependence      Diabetic gastroparesis (H)      Diet-controlled type 2 diabetes mellitus (H)      DVT of axillary vein, acute right (H)      Fibromyalgia      Gastro-oesophageal reflux disease      Hernia of unspecified site of abdominal cavity without mention of obstruction or gangrene     bilateral     HTN (hypertension)      Hypertriglyceridemia      Obstructive sleep apnea      ANNETTE (obstructive sleep apnea)      Papillary carcinoma of thyroid (H)     s/p thyroidectomy - Ruegemer     PE (pulmonary embolism)      Poliomyelitis     poor circulation right leg     Postsurgical hypothyroidism     s/p papillary thryoid cancer - Ruegemer     Pulmonary embolism (H)      Rosacea      S/P carpal tunnel release     bilateral     S/P hardware removal 01/2014    still with lingering foot pain     S/P shoulder surgery     bilateral     Septic joint (H)     right knee     Venous insufficiency      Venous thrombosis 1999    right axillary vein     Past Surgical History:   Procedure Laterality Date     AMPUTATE TOE(S)  3/15/2012    Procedure:AMPUTATE TOE(S); Surgeon:RUBEN MOSES; Location: OR     APPENDECTOMY  1972     ARTHRODESIS FOOT  3/15/2012    Procedure:ARTHRODESIS FOOT; RIGHT TRIPLE ARTHRODESIS, FIFTH TOE AMPUTATION, LATERAL LIGAMENT RECONSTRUCTION, TENDON TRANSFER AND RELEASE [MINI C-ARM, ARTHREX 4.5 AND 6.7 STAPLES, BIOCOMPOSITE TENODESIS SCREWS]; Surgeon:RUBEN MOSES; Location: OR      FREEING BOWEL ADHESION,ENTEROLYSIS      1986, 1996, 1999     C TOTAL ABDOM HYSTERECTOMY  1980    + BSO     CHOLECYSTECTOMY       COLOSTOMY  2/7/2012    Procedure:COLOSTOMY; CREATION OF SIGMOID COLOSTOMY AND EXTENSIVE  LYSIS OF ADHESIONS; Surgeon:MONTSERRAT BENDER; Location: OR     GI SURGERY      weakened rectal  sphincter with artificial stimulator     LAPAROTOMY, LYSIS ADHESIONS, COMBINED  2/7/2012    Procedure:COMBINED LAPAROTOMY, LYSIS ADHESIONS; Surgeon:MONTSERRAT BENDER; Location:[facility] OR     RELEASE TENDON FOOT  3/15/2012    Procedure:RELEASE TENDON FOOT; Surgeon:SUKHDEEP METZ; Location:[facility] OR     REMOVE HARDWARE FOOT  12/13/2012    Procedure: REMOVE HARDWARE FOOT;  RIGHT FOOT REMOVAL OF HARDWARE;  Surgeon: Sukhdeep Metz MD;  Location: [facility] OR     ZZC NONSPECIFIC PROCEDURE      throidectomy     Current Outpatient Medications   Medication Sig Dispense Refill     albuterol (PROAIR HFA/PROVENTIL HFA/VENTOLIN HFA) 108 (90 Base) MCG/ACT inhaler Inhale 2-4 puffs into the lungs every 2 hours as needed for shortness of breath / dyspnea 1 Inhaler 1     amLODIPine (NORVASC) 2.5 MG tablet Take 1 tablet (2.5 mg) by mouth 2 times daily 180 tablet 3     atenolol (TENORMIN) 50 MG tablet Take 1 tablet (50 mg) by mouth 2 times daily 180 tablet 3     Azelastine HCl (ASTEPRO NA)        BIOTIN PO Take 1,000 mcg by mouth       Cholecalciferol (VITAMIN D-3 PO) Take 2 tablets by mouth       clotrimazole (LOTRIMIN) 1 % cream Apply topically daily       Continuous Blood Gluc Sensor (FREESTYLE BRANDY 14 DAY SENSOR) [facility] APPLY 1 SENSOR AND CHANGE EVERY 14 DAYS AS DIRECTED 2 each 2     diphenhydrAMINE-acetaminophen (TYLENOL PM)  MG tablet Take 1 tablet by mouth At Bedtime Reported on 3/20/2017       ezetimibe (ZETIA) 10 MG tablet Take 1 tablet (10 mg) by mouth daily 90 tablet 3     famotidine (PEPCID) 20 MG tablet Take 2 tablets (40 mg) by mouth as needed 180 tablet 3     fenofibrate (TRICOR) 145 MG tablet TAKE ONE TABLET BY MOUTH ONCE DAILY 90 tablet 3     ferrous sulfate (IRON) 325 (65 FE) MG tablet Take 325 mg by mouth daily (with breakfast)       fexofenadine (ALLEGRA) 180 MG tablet Take 180 mg by mouth daily. 120 0     fluticasone (FLONASE) 50 MCG/ACT spray Spray 2 sprays in nostril daily 2 sprays in each nostril qd 1  Bottle 0     furosemide (LASIX) 20 MG tablet TAKE ONE TABLET (20MG) BY MOUTH ONCE DAILY; TAKE AN ADDITIONAL TABLET (20MG) IN THE AFTERNOON ON MONDAY, WEDNESDAY AND FRIDAY 135 tablet 0     gabapentin (NEURONTIN) 100 MG capsule Take 1 capsule (100 mg) by mouth every evening as needed 90 capsule 3     insulin glargine U-300 (TOUJEO) 300 UNIT/ML (1 units dial) pen Inject 8 Units Subcutaneous every morning 15 mL 1     levothyroxine (SYNTHROID) 112 MCG tablet Take 112 mcg by mouth daily Take 112 mcg daily except for Friday takes only 56 mcg.  Brand name Synthroid       lisinopril (ZESTRIL) 40 MG tablet TAKE ONE TABLET BY MOUTH ONCE DAILY 90 tablet 3     metoclopramide (REGLAN) 5 MG tablet Take 1-2 tablets (5-10 mg) by mouth 2 times daily as needed a 120 tablet 0     MULTIVITAMINS OR TABS ONE DAILY 100 3     nitroGLYcerin (NITROSTAT) 0.4 MG sublingual tablet Place 1 tablet (0.4 mg) under the tongue every 5 minutes as needed for chest pain (no more than 3 in one hour; after 3rd, call 911.) 25 tablet 3     nystatin (MYCOSTATIN) cream Apply topically daily as needed        nystatin-triamcinolone (MYCOLOG II) cream Apply topically daily as needed       ondansetron (ZOFRAN) 4 MG tablet Take 1 tablet by mouth every 6 hours as needed Reported on 3/20/2017       order for DME Equipment being ordered: Compression stockings - Knee High; 20-30 mmHg compression - note would like adhesive band to keep the stocking from sliding down 3 each 0     order for DME Donut Pillow 1 each 0     order for DME Equipment being ordered: Oral appliance for sleep apnea 1 Units 0     pantoprazole (PROTONIX) 40 MG EC tablet Take 40 mg by mouth 2 times daily       sitagliptin (JANUVIA) 50 MG tablet Take 1 tablet (50 mg) by mouth daily In the morning       aspirin (SB LOW DOSE ASA EC) 81 MG EC tablet Take 81 mg by mouth daily       minoxidil (LONITEN) 2.5 MG tablet One half tablet daily (Patient not taking: Reported on 11/4/2020) 60 tablet 1        Allergies   Allergen Reactions     Nsaids Difficulty breathing     Increased creatinine     Toradol Difficulty breathing     Shortness of breath     Celecoxib Itching and Rash     Codeine Itching     With higher doses     Crestor [Rosuvastatin] Muscle Pain (Myalgia)     No Clinical Screening - See Comments Itching     Fragrance     Vioxx Other (See Comments)     Heart races     Conjugated Estrogens Rash     Sulfa Drugs Rash        Social History     Tobacco Use     Smoking status: Never Smoker     Smokeless tobacco: Never Used   Substance Use Topics     Alcohol use: Not Currently     Alcohol/week: 0.0 standard drinks     Family History   Problem Relation Age of Onset     Arthritis Mother      Hypertension Mother      Cerebrovascular Disease Mother      Obesity Mother      Heart Disease Mother         MI's     Hypertension Father      Respiratory Father         Adult RDS     Diabetes Father         adult     Arthritis Sister      Cancer Sister      Arthritis Sister      Hypertension Sister      Cancer Sister         colon polup     Heart Disease Brother         MI at 54     Hypertension Sister      Lipids Brother      Hypertension Brother      Lipids Sister      Obesity Sister      Obesity Maternal Grandmother      Skin Cancer Maternal Grandmother         skin cancer unknown     Cancer Maternal Grandmother         unknown skin cancer on face     History   Drug Use No         Objective     BP (!) 141/72 (BP Location: Right arm, Cuff Size: Adult Large)   Pulse 56   Temp 96.6  F (35.9  C) (Temporal)   Ht 1.524 m (5')   Wt 81.2 kg (179 lb)   SpO2 100%   Breastfeeding No   BMI 34.96 kg/m      Physical Exam    GENERAL APPEARANCE: healthy, alert and no distress     EYES: EOMI,       NECK: no adenopathy, no asymmetry, masses, or scars and thyroid normal to palpation     RESP: lungs clear to auscultation - no rales, rhonchi or wheezes     CV: regular rates and rhythm, normal S1 S2, no S3 or S4 and no murmur,  click or rub     ABDOMEN:  Obese, soft, nontender, -ostomy     MS: left ankle brace, extremities normal- no gross deformities noted, no evidence of inflammation in joints, FROM in all extremities.     SKIN: no suspicious lesions or rashes     NEURO: Normal strength and tone, sensory exam grossly normal, mentation intact and speech normal     PSYCH: mentation appears normal. and affect normal/bright     LYMPHATICS: No cervical adenopathy    Recent Labs   Lab Test 10/05/20  1717 07/22/20  1344 05/18/20  1423 09/10/19  1202 09/10/19  1202 03/29/19  1355 03/29/19  1355   HGB  --  12.3 11.3*   < >  --    < >  --    PLT  --  251 217   < >  --    < >  --     140 140   < > 138   < > 139   POTASSIUM 4.6 4.9 4.4   < > 4.9   < > 4.7   CR 1.20* 1.08* 1.07*   < > 1.49*   < > 1.20*   A1C  --   --   --   --  6.8*  --  8.8*    < > = values in this interval not displayed.        Diagnostics:  Recent Results (from the past 24 hour(s))   CBC with platelets    Collection Time: 11/04/20 12:32 PM   Result Value Ref Range    WBC 10.2 4.0 - 11.0 10e9/L    RBC Count 3.77 (L) 3.8 - 5.2 10e12/L    Hemoglobin 11.8 11.7 - 15.7 g/dL    Hematocrit 34.8 (L) 35.0 - 47.0 %    MCV 92 78 - 100 fl    MCH 31.3 26.5 - 33.0 pg    MCHC 33.9 31.5 - 36.5 g/dL    RDW 12.5 10.0 - 15.0 %    Platelet Count 263 150 - 450 10e9/L      EKG: sinus bradycardia, normal axis, normal intervals, no acute ST/T changes c/w ischemia, no LVH by voltage criteria, unchanged from previous tracings    Revised Cardiac Risk Index (RCRI):  The patient has the following serious cardiovascular risks for perioperative complications:   - Diabetes Mellitus (on Insulin) = 1 point     RCRI Interpretation: 1 point: Class II (low risk - 0.9% complication rate)           Assessment & Plan   The proposed surgical procedure is considered INTERMEDIATE risk.    Patient Instructions   Z01.818) Preop general physical exam  (primary encounter diagnosis)  Comment: You are medically optimized  for your upcoming surgery pending labs and EKG.  We will plan to hold lisinopril on day of surgery.  Hold aspirin for 1 week leading up to surgery.  With regards to insulin dosing, plan to take half of your usual dose of Lantus on the morning of surgery and monitor closely.  Deep vein thrombosis prophylaxis per orthopedics  Plan: **Basic metabolic panel FUTURE anytime, CBC         with platelets and differential, EKG 12-lead         complete w/read - Clinics             (F32.0) Mild major depression (H)  Comment: No acute concerns at this time  Plan:      (E11.69,  Z79.4) Type 2 diabetes mellitus with other specified complication, with long-term current use of insulin (H)  Comment: For diabetic management, we will plan to continue insulin.  Hold Januvia on the day of surgery.  Currently taking insulin glargine 5 units in the morning.  Will take only half, which would be 2.5 units, in the morning on the day of surgery  Plan:      (I73.9) Insufficiency, arterial, peripheral (H)  Comment: Continue to monitor  Plan:      (E66.01) Morbid obesity (H)  Comment: No acute concerns.  This will be monitored during surgery and postoperatively  Plan:      (N18.3) CKD (chronic kidney disease) stage 3, GFR 30-59 ml/min (H)  Comment: We will recheck renal function today  Plan:      (G47.33) ANNETTE (obstructive sleep apnea)  Comment: Continue use of CPAP  Plan:      (E89.0) Postsurgical hypothyroidism  Comment: Recheck thyroid with endocrinology  Plan:      (I10) Benign essential hypertension  Comment: Blood pressure is elevated today.  Will need to recheck before you leave.     Plan:       (G47.33) ANNETTE (obstructive sleep apnea)  Comment: continue ultrasound of CPAP  Plan:       (E78.2) Mixed hyperlipidemia  Comment: continue zetia  Plan: ezetimibe (ZETIA) 10 MG tablet            (Z13.6) CARDIOVASCULAR SCREENING; LDL GOAL LESS THAN 130  Comment:   Plan: fenofibrate (TRICOR) 145 MG tablet                      Risks and  Recommendations:  The patient has the following additional risks and recommendations for perioperative complications:   - No identified additional risk factors other than previously addressed        RECOMMENDATION:  APPROVAL GIVEN to proceed with proposed procedure, without further diagnostic evaluation.    Signed Electronically by: Shola Benoit MD, MD    Copy of this evaluation report is provided to requesting physician.    [facility] Preop Guidelines    Revised Cardiac Risk Index

## 2020-11-05 LAB
ALBUMIN SERPL-MCNC: 3.8 G/DL (ref 3.4–5)
ANION GAP SERPL CALCULATED.3IONS-SCNC: 5 MMOL/L (ref 3–14)
BUN SERPL-MCNC: 30 MG/DL (ref 7–30)
CALCIUM SERPL-MCNC: 9.5 MG/DL (ref 8.5–10.1)
CHLORIDE SERPL-SCNC: 107 MMOL/L (ref 94–109)
CO2 SERPL-SCNC: 27 MMOL/L (ref 20–32)
CREAT SERPL-MCNC: 1.19 MG/DL (ref 0.52–1.04)
GFR SERPL CREATININE-BSD FRML MDRD: 45 ML/MIN/{1.73_M2}
GLUCOSE SERPL-MCNC: 123 MG/DL (ref 70–99)
PHOSPHATE SERPL-MCNC: 4 MG/DL (ref 2.5–4.5)
POTASSIUM SERPL-SCNC: 4.4 MMOL/L (ref 3.4–5.3)
SODIUM SERPL-SCNC: 139 MMOL/L (ref 133–144)

## 2020-11-06 LAB
BACTERIA SPEC CULT: NORMAL
SPECIMEN SOURCE: NORMAL

## 2020-11-07 NOTE — RESULT ENCOUNTER NOTE
Gerard Gaspar,    I have had the opportunity to review your recent results and an interpretation is as follows:  Your blood counts all returned within normal limits  Your urinalysis did show evidence of white blood cells in the urine, but urine culture also did not grow any bacteria.  I recommend repeating this again in next couple weeks    Sincerely,  Shola Benoit MD

## 2020-11-10 ENCOUNTER — TRANSFERRED RECORDS (OUTPATIENT)
Dept: HEALTH INFORMATION MANAGEMENT | Facility: CLINIC | Age: 75
End: 2020-11-10

## 2020-11-11 ENCOUNTER — TELEPHONE (OUTPATIENT)
Dept: FAMILY MEDICINE | Facility: CLINIC | Age: 75
End: 2020-11-11

## 2020-11-11 ENCOUNTER — TRANSFERRED RECORDS (OUTPATIENT)
Dept: HEALTH INFORMATION MANAGEMENT | Facility: CLINIC | Age: 75
End: 2020-11-11

## 2020-11-11 LAB — RETINOPATHY: NEGATIVE

## 2020-11-11 NOTE — TELEPHONE ENCOUNTER
Please abstract the following data from this visit with this patient into the appropriate field in Epic:    Tests that can be patient reported without a hard copy:    Eye exam with ophthalmology on this date: 11/11/020 at [facility]    Rabia Leon CMA

## 2020-11-12 ENCOUNTER — TRANSFERRED RECORDS (OUTPATIENT)
Dept: HEALTH INFORMATION MANAGEMENT | Facility: CLINIC | Age: 75
End: 2020-11-12

## 2020-11-18 ENCOUNTER — AMBULATORY - HEALTHEAST (OUTPATIENT)
Dept: ADMINISTRATIVE | Facility: CLINIC | Age: 75
End: 2020-11-18

## 2020-11-19 ENCOUNTER — OFFICE VISIT - HEALTHEAST (OUTPATIENT)
Dept: GERIATRICS | Facility: CLINIC | Age: 75
End: 2020-11-19

## 2020-11-19 ENCOUNTER — COMMUNICATION - HEALTHEAST (OUTPATIENT)
Dept: GERIATRICS | Facility: CLINIC | Age: 75
End: 2020-11-19

## 2020-11-19 ENCOUNTER — TRANSFERRED RECORDS (OUTPATIENT)
Dept: HEALTH INFORMATION MANAGEMENT | Facility: CLINIC | Age: 75
End: 2020-11-19

## 2020-11-19 DIAGNOSIS — Z96.612 STATUS POST TOTAL SHOULDER ARTHROPLASTY, LEFT: ICD-10-CM

## 2020-11-19 DIAGNOSIS — M25.519 SHOULDER PAIN: ICD-10-CM

## 2020-11-19 DIAGNOSIS — E44.0 MODERATE PROTEIN-CALORIE MALNUTRITION (H): ICD-10-CM

## 2020-11-19 DIAGNOSIS — R52 PAIN MANAGEMENT: ICD-10-CM

## 2020-11-20 ENCOUNTER — RECORDS - HEALTHEAST (OUTPATIENT)
Dept: LAB | Facility: CLINIC | Age: 75
End: 2020-11-20

## 2020-11-20 ENCOUNTER — OFFICE VISIT - HEALTHEAST (OUTPATIENT)
Dept: GERIATRICS | Facility: CLINIC | Age: 75
End: 2020-11-20

## 2020-11-20 ENCOUNTER — TRANSFERRED RECORDS (OUTPATIENT)
Dept: HEALTH INFORMATION MANAGEMENT | Facility: CLINIC | Age: 75
End: 2020-11-20

## 2020-11-20 DIAGNOSIS — M19.012 PRIMARY OSTEOARTHRITIS OF LEFT SHOULDER: ICD-10-CM

## 2020-11-20 DIAGNOSIS — R52 PAIN MANAGEMENT: ICD-10-CM

## 2020-11-20 DIAGNOSIS — R10.13 EPIGASTRIC PAIN: ICD-10-CM

## 2020-11-20 DIAGNOSIS — E11.69 TYPE 2 DIABETES MELLITUS WITH OTHER SPECIFIED COMPLICATION, UNSPECIFIED WHETHER LONG TERM INSULIN USE (H): ICD-10-CM

## 2020-11-20 DIAGNOSIS — R11.0 NAUSEA: ICD-10-CM

## 2020-11-20 LAB
ALBUMIN SERPL-MCNC: 2.7 G/DL (ref 3.5–5)
ALP SERPL-CCNC: 45 U/L (ref 45–120)
ALT SERPL W P-5'-P-CCNC: 10 U/L (ref 0–45)
ANION GAP SERPL CALCULATED.3IONS-SCNC: 7 MMOL/L (ref 5–18)
AST SERPL W P-5'-P-CCNC: 18 U/L (ref 0–40)
BILIRUB DIRECT SERPL-MCNC: 0.1 MG/DL
BILIRUB SERPL-MCNC: 0.3 MG/DL (ref 0–1)
BUN SERPL-MCNC: 35 MG/DL (ref 8–28)
CALCIUM SERPL-MCNC: 8.6 MG/DL (ref 8.5–10.5)
CHLORIDE BLD-SCNC: 101 MMOL/L (ref 98–107)
CO2 SERPL-SCNC: 26 MMOL/L (ref 22–31)
CREAT SERPL-MCNC: 1.15 MG/DL (ref 0.6–1.1)
GFR SERPL CREATININE-BSD FRML MDRD: 46 ML/MIN/1.73M2
GLUCOSE BLD-MCNC: 125 MG/DL (ref 70–125)
HGB BLD-MCNC: 10.2 G/DL (ref 12–16)
LIPASE SERPL-CCNC: 15 U/L (ref 0–52)
POTASSIUM BLD-SCNC: 5.6 MMOL/L (ref 3.5–5)
PROT SERPL-MCNC: 5.3 G/DL (ref 6–8)
SODIUM SERPL-SCNC: 134 MMOL/L (ref 136–145)

## 2020-11-24 ENCOUNTER — MEDICAL CORRESPONDENCE (OUTPATIENT)
Dept: HEALTH INFORMATION MANAGEMENT | Facility: CLINIC | Age: 75
End: 2020-11-24

## 2020-11-24 ENCOUNTER — RECORDS - HEALTHEAST (OUTPATIENT)
Dept: LAB | Facility: CLINIC | Age: 75
End: 2020-11-24

## 2020-11-24 ENCOUNTER — OFFICE VISIT - HEALTHEAST (OUTPATIENT)
Dept: GERIATRICS | Facility: CLINIC | Age: 75
End: 2020-11-24

## 2020-11-24 DIAGNOSIS — D50.9 IRON DEFICIENCY ANEMIA, UNSPECIFIED IRON DEFICIENCY ANEMIA TYPE: ICD-10-CM

## 2020-11-24 DIAGNOSIS — Z96.612 STATUS POST TOTAL SHOULDER ARTHROPLASTY, LEFT: ICD-10-CM

## 2020-11-24 DIAGNOSIS — K21.9 GASTROESOPHAGEAL REFLUX DISEASE, UNSPECIFIED WHETHER ESOPHAGITIS PRESENT: ICD-10-CM

## 2020-11-24 DIAGNOSIS — R52 PAIN MANAGEMENT: ICD-10-CM

## 2020-11-24 DIAGNOSIS — E66.01 MORBID OBESITY (H): ICD-10-CM

## 2020-11-24 DIAGNOSIS — F51.01 PRIMARY INSOMNIA: ICD-10-CM

## 2020-11-24 DIAGNOSIS — E55.9 VITAMIN D DEFICIENCY: ICD-10-CM

## 2020-11-24 LAB
ANION GAP SERPL CALCULATED.3IONS-SCNC: 8 MMOL/L (ref 5–18)
BUN SERPL-MCNC: 39 MG/DL (ref 8–28)
CALCIUM SERPL-MCNC: 8.9 MG/DL (ref 8.5–10.5)
CHLORIDE BLD-SCNC: 103 MMOL/L (ref 98–107)
CO2 SERPL-SCNC: 26 MMOL/L (ref 22–31)
CREAT SERPL-MCNC: 1.13 MG/DL (ref 0.6–1.1)
GFR SERPL CREATININE-BSD FRML MDRD: 47 ML/MIN/1.73M2
GLUCOSE BLD-MCNC: 139 MG/DL (ref 70–125)
POTASSIUM BLD-SCNC: 5.7 MMOL/L (ref 3.5–5)
SODIUM SERPL-SCNC: 137 MMOL/L (ref 136–145)

## 2020-11-24 ASSESSMENT — MIFFLIN-ST. JEOR: SCORE: 1227.77

## 2020-11-25 ENCOUNTER — COMMUNICATION - HEALTHEAST (OUTPATIENT)
Dept: GERIATRICS | Facility: CLINIC | Age: 75
End: 2020-11-25

## 2020-11-25 LAB
ANION GAP SERPL CALCULATED.3IONS-SCNC: 8 MMOL/L (ref 5–18)
BUN SERPL-MCNC: 34 MG/DL (ref 8–28)
CALCIUM SERPL-MCNC: 8.9 MG/DL (ref 8.5–10.5)
CHLORIDE BLD-SCNC: 103 MMOL/L (ref 98–107)
CO2 SERPL-SCNC: 27 MMOL/L (ref 22–31)
CREAT SERPL-MCNC: 1.05 MG/DL (ref 0.6–1.1)
GFR SERPL CREATININE-BSD FRML MDRD: 51 ML/MIN/1.73M2
GLUCOSE BLD-MCNC: 96 MG/DL (ref 70–125)
POTASSIUM BLD-SCNC: 4.8 MMOL/L (ref 3.5–5)
SODIUM SERPL-SCNC: 138 MMOL/L (ref 136–145)

## 2020-11-26 ENCOUNTER — RECORDS - HEALTHEAST (OUTPATIENT)
Dept: LAB | Facility: CLINIC | Age: 75
End: 2020-11-26

## 2020-11-27 ENCOUNTER — OFFICE VISIT - HEALTHEAST (OUTPATIENT)
Dept: GERIATRICS | Facility: CLINIC | Age: 75
End: 2020-11-27

## 2020-11-27 ENCOUNTER — TRANSFERRED RECORDS (OUTPATIENT)
Dept: HEALTH INFORMATION MANAGEMENT | Facility: CLINIC | Age: 75
End: 2020-11-27

## 2020-11-27 DIAGNOSIS — Z96.612 STATUS POST TOTAL REPLACEMENT OF LEFT SHOULDER: ICD-10-CM

## 2020-11-27 DIAGNOSIS — I15.9 SECONDARY HYPERTENSION: ICD-10-CM

## 2020-11-27 DIAGNOSIS — R52 PAIN MANAGEMENT: ICD-10-CM

## 2020-11-27 LAB
25(OH)D3 SERPL-MCNC: 58 NG/ML (ref 30–80)
ERYTHROCYTE [DISTWIDTH] IN BLOOD BY AUTOMATED COUNT: 12.7 % (ref 11–14.5)
HBA1C MFR BLD: 6.2 %
HBA1C MFR BLD: 6.2 % (ref 0–5.7)
HCT VFR BLD AUTO: 28.4 % (ref 35–47)
HGB BLD-MCNC: 9.2 G/DL (ref 12–16)
MCH RBC QN AUTO: 30.5 PG (ref 27–34)
MCHC RBC AUTO-ENTMCNC: 32.4 G/DL (ref 32–36)
MCV RBC AUTO: 94 FL (ref 80–100)
PLATELET # BLD AUTO: 253 THOU/UL (ref 140–440)
PMV BLD AUTO: 11.2 FL (ref 8.5–12.5)
RBC # BLD AUTO: 3.02 MILL/UL (ref 3.8–5.4)
TSH SERPL DL<=0.005 MIU/L-ACNC: 14.89 UIU/ML (ref 0.3–5)
TSH SERPL-ACNC: 14.89 UIU/ML (ref 0.3–5)
VIT B12 SERPL-MCNC: 860 PG/ML (ref 213–816)
WBC: 8 THOU/UL (ref 4–11)

## 2020-11-28 ENCOUNTER — RECORDS - HEALTHEAST (OUTPATIENT)
Dept: LAB | Facility: CLINIC | Age: 75
End: 2020-11-28

## 2020-11-30 LAB
ALBUMIN SERPL-MCNC: 2.7 G/DL (ref 3.5–5)
ALP SERPL-CCNC: 32 U/L (ref 45–120)
ALT SERPL W P-5'-P-CCNC: <9 U/L (ref 0–45)
ALT SERPL-CCNC: <9 U/L (ref 0–45)
ANION GAP SERPL CALCULATED.3IONS-SCNC: 5 MMOL/L (ref 5–18)
AST SERPL W P-5'-P-CCNC: 16 U/L (ref 0–40)
AST SERPL-CCNC: 16 U/L (ref 0–40)
BILIRUB DIRECT SERPL-MCNC: 0.2 MG/DL
BILIRUB SERPL-MCNC: 0.2 MG/DL (ref 0–1)
BUN SERPL-MCNC: 34 MG/DL (ref 8–28)
CALCIUM SERPL-MCNC: 8.7 MG/DL (ref 8.5–10.5)
CHLORIDE BLD-SCNC: 105 MMOL/L (ref 98–107)
CO2 SERPL-SCNC: 26 MMOL/L (ref 22–31)
CREAT SERPL-MCNC: 1.03 MG/DL (ref 0.6–1.1)
CREAT SERPL-MCNC: 1.03 MG/DL (ref 0.6–1.1)
GFR SERPL CREATININE-BSD FRML MDRD: 52 ML/MIN/1.73M2
GFR SERPL CREATININE-BSD FRML MDRD: 52 ML/MIN/1.73M2
GLUCOSE BLD-MCNC: 101 MG/DL (ref 70–125)
GLUCOSE SERPL-MCNC: 101 MG/DL (ref 70–125)
POTASSIUM BLD-SCNC: 4.7 MMOL/L (ref 3.5–5)
POTASSIUM SERPL-SCNC: 4.7 MMOL/L (ref 3.5–5)
PROT SERPL-MCNC: 5.3 G/DL (ref 6–8)
SODIUM SERPL-SCNC: 136 MMOL/L (ref 136–145)

## 2020-12-01 ENCOUNTER — OFFICE VISIT - HEALTHEAST (OUTPATIENT)
Dept: GERIATRICS | Facility: CLINIC | Age: 75
End: 2020-12-01

## 2020-12-01 ENCOUNTER — COMMUNICATION - HEALTHEAST (OUTPATIENT)
Dept: GERIATRICS | Facility: CLINIC | Age: 75
End: 2020-12-01

## 2020-12-01 ENCOUNTER — TRANSFERRED RECORDS (OUTPATIENT)
Dept: HEALTH INFORMATION MANAGEMENT | Facility: CLINIC | Age: 75
End: 2020-12-01

## 2020-12-01 ENCOUNTER — RECORDS - HEALTHEAST (OUTPATIENT)
Dept: LAB | Facility: CLINIC | Age: 75
End: 2020-12-01

## 2020-12-01 DIAGNOSIS — D50.9 IRON DEFICIENCY ANEMIA, UNSPECIFIED IRON DEFICIENCY ANEMIA TYPE: ICD-10-CM

## 2020-12-01 DIAGNOSIS — F51.01 PRIMARY INSOMNIA: ICD-10-CM

## 2020-12-01 DIAGNOSIS — M25.519 SHOULDER PAIN: ICD-10-CM

## 2020-12-01 DIAGNOSIS — R52 PAIN MANAGEMENT: ICD-10-CM

## 2020-12-01 DIAGNOSIS — E55.9 VITAMIN D DEFICIENCY: ICD-10-CM

## 2020-12-01 DIAGNOSIS — Z96.612 STATUS POST TOTAL SHOULDER ARTHROPLASTY, LEFT: ICD-10-CM

## 2020-12-01 DIAGNOSIS — E66.01 MORBID OBESITY (H): ICD-10-CM

## 2020-12-01 DIAGNOSIS — K21.9 GASTROESOPHAGEAL REFLUX DISEASE, UNSPECIFIED WHETHER ESOPHAGITIS PRESENT: ICD-10-CM

## 2020-12-01 ASSESSMENT — MIFFLIN-ST. JEOR: SCORE: 1220.06

## 2020-12-02 ENCOUNTER — COMMUNICATION - HEALTHEAST (OUTPATIENT)
Dept: GERIATRICS | Facility: CLINIC | Age: 75
End: 2020-12-02

## 2020-12-02 LAB
BASOPHILS # BLD AUTO: 0.1 THOU/UL (ref 0–0.2)
BASOPHILS NFR BLD AUTO: 1 % (ref 0–2)
EOSINOPHIL # BLD AUTO: 0.2 THOU/UL (ref 0–0.4)
EOSINOPHIL NFR BLD AUTO: 4 % (ref 0–6)
ERYTHROCYTE [DISTWIDTH] IN BLOOD BY AUTOMATED COUNT: 12.6 % (ref 11–14.5)
HCT VFR BLD AUTO: 27.5 % (ref 35–47)
HGB BLD-MCNC: 9.1 G/DL (ref 12–16)
IMM GRANULOCYTES # BLD: 0 THOU/UL
IMM GRANULOCYTES NFR BLD: 1 %
LYMPHOCYTES # BLD AUTO: 2.3 THOU/UL (ref 0.8–4.4)
LYMPHOCYTES NFR BLD AUTO: 35 % (ref 20–40)
MCH RBC QN AUTO: 31.1 PG (ref 27–34)
MCHC RBC AUTO-ENTMCNC: 33.1 G/DL (ref 32–36)
MCV RBC AUTO: 94 FL (ref 80–100)
MONOCYTES # BLD AUTO: 0.6 THOU/UL (ref 0–0.9)
MONOCYTES NFR BLD AUTO: 9 % (ref 2–10)
NEUTROPHILS # BLD AUTO: 3.3 THOU/UL (ref 2–7.7)
NEUTROPHILS NFR BLD AUTO: 51 % (ref 50–70)
PLATELET # BLD AUTO: 217 THOU/UL (ref 140–440)
PMV BLD AUTO: 11.2 FL (ref 8.5–12.5)
RBC # BLD AUTO: 2.93 MILL/UL (ref 3.8–5.4)
WBC: 6.6 THOU/UL (ref 4–11)

## 2020-12-03 ENCOUNTER — TRANSFERRED RECORDS (OUTPATIENT)
Dept: HEALTH INFORMATION MANAGEMENT | Facility: CLINIC | Age: 75
End: 2020-12-03

## 2020-12-03 ENCOUNTER — OFFICE VISIT - HEALTHEAST (OUTPATIENT)
Dept: GERIATRICS | Facility: CLINIC | Age: 75
End: 2020-12-03

## 2020-12-03 DIAGNOSIS — R52 PAIN MANAGEMENT: ICD-10-CM

## 2020-12-03 DIAGNOSIS — Z96.612 STATUS POST TOTAL SHOULDER ARTHROPLASTY, LEFT: ICD-10-CM

## 2020-12-03 DIAGNOSIS — D64.9 ANEMIA, UNSPECIFIED TYPE: ICD-10-CM

## 2020-12-08 ENCOUNTER — OFFICE VISIT - HEALTHEAST (OUTPATIENT)
Dept: GERIATRICS | Facility: CLINIC | Age: 75
End: 2020-12-08

## 2020-12-08 ENCOUNTER — TRANSFERRED RECORDS (OUTPATIENT)
Dept: HEALTH INFORMATION MANAGEMENT | Facility: CLINIC | Age: 75
End: 2020-12-08

## 2020-12-08 DIAGNOSIS — E55.9 VITAMIN D DEFICIENCY: ICD-10-CM

## 2020-12-08 DIAGNOSIS — K21.9 GASTROESOPHAGEAL REFLUX DISEASE, UNSPECIFIED WHETHER ESOPHAGITIS PRESENT: ICD-10-CM

## 2020-12-08 DIAGNOSIS — E66.01 MORBID OBESITY (H): ICD-10-CM

## 2020-12-08 DIAGNOSIS — R52 PAIN MANAGEMENT: ICD-10-CM

## 2020-12-08 DIAGNOSIS — F51.01 PRIMARY INSOMNIA: ICD-10-CM

## 2020-12-08 DIAGNOSIS — E03.9 ACQUIRED HYPOTHYROIDISM: ICD-10-CM

## 2020-12-08 DIAGNOSIS — Z96.612 STATUS POST TOTAL SHOULDER ARTHROPLASTY, LEFT: ICD-10-CM

## 2020-12-08 ASSESSMENT — MIFFLIN-ST. JEOR: SCORE: 1195.11

## 2020-12-09 ENCOUNTER — COMMUNICATION - HEALTHEAST (OUTPATIENT)
Dept: GERIATRICS | Facility: CLINIC | Age: 75
End: 2020-12-09

## 2020-12-09 ENCOUNTER — RECORDS - HEALTHEAST (OUTPATIENT)
Dept: LAB | Facility: CLINIC | Age: 75
End: 2020-12-09

## 2020-12-09 DIAGNOSIS — M25.519 SHOULDER PAIN: ICD-10-CM

## 2020-12-10 ENCOUNTER — TRANSFERRED RECORDS (OUTPATIENT)
Dept: HEALTH INFORMATION MANAGEMENT | Facility: CLINIC | Age: 75
End: 2020-12-10

## 2020-12-10 ENCOUNTER — OFFICE VISIT - HEALTHEAST (OUTPATIENT)
Dept: GERIATRICS | Facility: CLINIC | Age: 75
End: 2020-12-10

## 2020-12-10 DIAGNOSIS — E03.9 ACQUIRED HYPOTHYROIDISM: ICD-10-CM

## 2020-12-10 DIAGNOSIS — K21.9 GASTROESOPHAGEAL REFLUX DISEASE, UNSPECIFIED WHETHER ESOPHAGITIS PRESENT: ICD-10-CM

## 2020-12-10 DIAGNOSIS — Z96.612 STATUS POST TOTAL SHOULDER ARTHROPLASTY, LEFT: ICD-10-CM

## 2020-12-10 DIAGNOSIS — E66.01 MORBID OBESITY (H): ICD-10-CM

## 2020-12-10 DIAGNOSIS — R52 PAIN MANAGEMENT: ICD-10-CM

## 2020-12-10 DIAGNOSIS — D50.9 IRON DEFICIENCY ANEMIA, UNSPECIFIED IRON DEFICIENCY ANEMIA TYPE: ICD-10-CM

## 2020-12-10 DIAGNOSIS — E55.9 VITAMIN D DEFICIENCY: ICD-10-CM

## 2020-12-10 LAB
ANION GAP SERPL CALCULATED.3IONS-SCNC: 8 MMOL/L (ref 5–18)
BUN SERPL-MCNC: 41 MG/DL (ref 8–28)
CALCIUM SERPL-MCNC: 9.1 MG/DL (ref 8.5–10.5)
CHLORIDE BLD-SCNC: 103 MMOL/L (ref 98–107)
CO2 SERPL-SCNC: 26 MMOL/L (ref 22–31)
CREAT SERPL-MCNC: 1.12 MG/DL (ref 0.6–1.1)
GFR SERPL CREATININE-BSD FRML MDRD: 48 ML/MIN/1.73M2
GLUCOSE BLD-MCNC: 83 MG/DL (ref 70–125)
POTASSIUM BLD-SCNC: 4.9 MMOL/L (ref 3.5–5)
SODIUM SERPL-SCNC: 137 MMOL/L (ref 136–145)
T4 FREE SERPL-MCNC: 1.3 NG/DL (ref 0.7–1.8)
TSH SERPL DL<=0.005 MIU/L-ACNC: 3.03 UIU/ML (ref 0.3–5)

## 2020-12-12 ENCOUNTER — TRANSFERRED RECORDS (OUTPATIENT)
Dept: HEALTH INFORMATION MANAGEMENT | Facility: CLINIC | Age: 75
End: 2020-12-12

## 2020-12-14 ENCOUNTER — AMBULATORY - HEALTHEAST (OUTPATIENT)
Dept: GERIATRICS | Facility: CLINIC | Age: 75
End: 2020-12-14

## 2020-12-17 ENCOUNTER — TELEPHONE (OUTPATIENT)
Dept: FAMILY MEDICINE | Facility: CLINIC | Age: 75
End: 2020-12-17

## 2020-12-17 NOTE — TELEPHONE ENCOUNTER
Dr. Benoit  Pt was admitted to [facility] 12/17/2020. Requesting the following verbal orders:  RN 1w3, 3prn (to begin week of 12/20/2020)  PT/OT/SW to eval and treat  HHA 2w3    Please note, pts atenolol dose was decreased while in TCU from 50mg BID to 25mg BID due to hypotension. Since pt has been home, she has had blood pressures 160/60's, she restarted the 50mg BID.    Also TCU increased her synthroid from 112mcg to 125mcg, however pt would like to talk to endo. Regarding this change and therefore has went back to taking 112mcg. Please advise if you have any concerns with these.    Thank you  Stephanie Gallegos RN, BSN  [email or web address]  [phone]

## 2020-12-18 ENCOUNTER — TELEPHONE (OUTPATIENT)
Dept: FAMILY MEDICINE | Facility: CLINIC | Age: 75
End: 2020-12-18

## 2020-12-18 ENCOUNTER — VIRTUAL VISIT (OUTPATIENT)
Dept: FAMILY MEDICINE | Facility: CLINIC | Age: 75
End: 2020-12-18
Payer: MEDICARE

## 2020-12-18 ENCOUNTER — NURSE TRIAGE (OUTPATIENT)
Dept: FAMILY MEDICINE | Facility: CLINIC | Age: 75
End: 2020-12-18

## 2020-12-18 DIAGNOSIS — N30.01 ACUTE CYSTITIS WITH HEMATURIA: Primary | ICD-10-CM

## 2020-12-18 DIAGNOSIS — R30.0 DYSURIA: Primary | ICD-10-CM

## 2020-12-18 LAB
ALBUMIN UR-MCNC: 10 MG/DL
APPEARANCE UR: ABNORMAL
BACTERIA #/AREA URNS HPF: ABNORMAL /HPF
BILIRUB UR QL STRIP: NEGATIVE
COLOR UR AUTO: YELLOW
GLUCOSE UR STRIP-MCNC: NEGATIVE MG/DL
HGB UR QL STRIP: NEGATIVE
KETONES UR STRIP-MCNC: NEGATIVE MG/DL
LEUKOCYTE ESTERASE UR QL STRIP: ABNORMAL
MUCOUS THREADS #/AREA URNS LPF: PRESENT /LPF
NITRATE UR QL: POSITIVE
PH UR STRIP: 5 PH (ref 5–7)
RBC #/AREA URNS AUTO: 11 /HPF (ref 0–2)
RENAL EPI CELLS #/AREA URNS HPF: <1 /HPF
SOURCE: ABNORMAL
SP GR UR STRIP: 1.03 (ref 1–1.03)
SQUAMOUS #/AREA URNS AUTO: <1 /HPF (ref 0–1)
TRANS CELLS #/AREA URNS HPF: 1 /HPF (ref 0–1)
UROBILINOGEN UR STRIP-MCNC: NORMAL MG/DL (ref 0–2)
WBC #/AREA URNS AUTO: 158 /HPF (ref 0–5)

## 2020-12-18 PROCEDURE — 99213 OFFICE O/P EST LOW 20 MIN: CPT | Mod: 95 | Performed by: NURSE PRACTITIONER

## 2020-12-18 PROCEDURE — 87086 URINE CULTURE/COLONY COUNT: CPT

## 2020-12-18 PROCEDURE — 81001 URINALYSIS AUTO W/SCOPE: CPT | Performed by: INTERNAL MEDICINE

## 2020-12-18 RX ORDER — CEPHALEXIN 500 MG/1
500 CAPSULE ORAL 2 TIMES DAILY
Qty: 14 CAPSULE | Refills: 0 | Status: SHIPPED | OUTPATIENT
Start: 2020-12-18 | End: 2021-01-18

## 2020-12-18 RX ORDER — CEPHALEXIN 500 MG/1
500 CAPSULE ORAL 2 TIMES DAILY
Qty: 14 CAPSULE | Refills: 0 | Status: SHIPPED | OUTPATIENT
Start: 2020-12-18 | End: 2020-12-18

## 2020-12-18 NOTE — TELEPHONE ENCOUNTER
Reason for Call:  Home Health Care    Demetria with [facility] called regarding (reason for call):     Orders are needed for this patient. - UA order for a UTI    PT:     OT:     Skilled Nursing:     Pt Provider: Dr. Benoit    Phone Number Homecare Nurse can be reached at: [phone]    Can we leave a detailed message on this number? YES    Phone number patient can be reached at: Other phone number:      Best Time:     Call taken on 12/18/2020 at 11:48 AM by Morena Peng

## 2020-12-18 NOTE — TELEPHONE ENCOUNTER
RN called back to home care.  Relayed provider message.  Home care states understanding.  No further action needed at this time.

## 2020-12-18 NOTE — TELEPHONE ENCOUNTER
RN called back to Nadja from [facility].    Pt called [facility] with concerns of buring, frequency, pressure, feeling not able to empty bladder.  Started yesterday afternoon.  No fever, no back pain, no other sx.    Home care is requesting UA.    RN called to pt.    States agreement with HC sx above.  Per triage protocol, scheduled in for VV.      Additional Information    Negative: Shock suspected (e.g., cold/pale/clammy skin, too weak to stand, low BP, rapid pulse)    Negative: Sounds like a life-threatening emergency to the triager    Negative: Followed a genital area injury    Negative: Followed a genital area injury (penis, scrotum)    Negative: Vaginal discharge    Negative: Pus (white, yellow) or bloody discharge from end of penis    Negative: [1] Taking antibiotic for urinary tract infection (UTI) AND [2] female    Negative: [1] Taking antibiotic for urinary tract infection (UTI) AND [2] male    Negative: [1] Discomfort (pain, burning or stinging) when passing urine AND [2] pregnant    Negative: [1] Discomfort (pain, burning or stinging) when passing urine AND [2] postpartum (from 0 to 6 weeks after delivery)    Negative: [1] Discomfort (pain, burning or stinging) when passing urine AND [2] female    Negative: [1] Discomfort (pain, burning or stinging) when passing urine AND [2] male    Negative: Pain or itching in the vulvar area    Negative: Pain in scrotum is main symptom    Negative: Blood in the urine is main symptom    Negative: Symptoms arising from use of a urinary catheter (Bello or Coude)    Negative: [1] Unable to urinate (or only a few drops) > 4 hours AND     [2] bladder feels very full (e.g., palpable bladder or strong urge to urinate)    Negative: [1] Decreased urination and [2] drinking very little AND [2] dehydration suspected (e.g., dark urine, no urine > 12 hours, very dry mouth, very lightheaded)    Negative: Patient sounds very sick or weak to the triager    Negative: Fever > 100.5 F (38.1 C)     "Negative: Side (flank) or lower back pain present    Negative: [1] Can't control passage of urine (i.e., urinary incontinence) AND [2] new onset (< 2 weeks) or worsening    Urinating more frequently than usual (i.e., frequency)    Answer Assessment - Initial Assessment Questions  1. SYMPTOM: \"What's the main symptom you're concerned about?\" (e.g., frequency, incontinence)      Frequency, burning, pressure    2. ONSET: \"When did the  frequency  start?\"      2-3 days ago    3. PAIN: \"Is there any pain?\" If so, ask: \"How bad is it?\" (Scale: 1-10; mild, moderate, severe)      No pain    4. CAUSE: \"What do you think is causing the symptoms?\"      UTI    5. OTHER SYMPTOMS: \"Do you have any other symptoms?\" (e.g., fever, flank pain, blood in urine, pain with urination)      No    6. PREGNANCY: \"Is there any chance you are pregnant?\" \"When was your last menstrual period?\"      N/a    Protocols used: URINARY SYMPTOMS-A-AH      "

## 2020-12-18 NOTE — TELEPHONE ENCOUNTER
RN called back to pt and relayed results.    Pt would like this to be resent to [id number].  Sent.    Educated on increasing fluid intake and to call back if any further concerns.    No further action needed at this time.

## 2020-12-18 NOTE — PROGRESS NOTES
"  Chasity Hodgson is a 75 year old female who is being evaluated via a billable video visit.      The patient has been notified of following:     \"This video visit will be conducted via a call between you and your physician/provider. We have found that certain health care needs can be provided without the need for an in-person physical exam.  This service lets us provide the care you need with a video conversation.  If a prescription is necessary we can send it directly to your pharmacy.  If lab work is needed we can place an order for that and you can then stop by our lab to have the test done at a later time.    Video visits are billed at different rates depending on your insurance coverage.  Please reach out to your insurance provider with any questions.    If during the course of the call the physician/provider feels a video visit is not appropriate, you will not be charged for this service.\"    Patient has given verbal consent for Video visit? Yes  How would you like to obtain your AVS? Mail a copy  If you are dropped from the video visit, the video invite should be resent to: Text to cell phone: [phone]  Will anyone else be joining your video visit? No      Subjective     Chasity Hodgson is a 75 year old female who presents today via video visit for the following health issues:    HPI         Dysuria 2-3 days   Very mild initially  Increased fluids   Now has frequency   Burns more at the end of urination     Past Medical History:   Diagnosis Date     Abdominal adhesions 1984, 96,99    s/p lysis     Allergic rhinitis, cause unspecified      Carotid stenosis      CPAP (continuous positive airway pressure) dependence      Diabetic gastroparesis (H)      Diet-controlled type 2 diabetes mellitus (H)      DVT of axillary vein, acute right (H)      Fibromyalgia      Gastro-oesophageal reflux disease      Hernia of unspecified site of abdominal cavity without mention of obstruction or gangrene     bilateral     HTN " (hypertension)      Hypertriglyceridemia      Obstructive sleep apnea      ANNETTE (obstructive sleep apnea)      Papillary carcinoma of thyroid (H)     s/p thyroidectomy - Ruegemer     PE (pulmonary embolism)      Poliomyelitis     poor circulation right leg     Postsurgical hypothyroidism     s/p papillary thryoid cancer - Ruegemer     Pulmonary embolism (H)      Rosacea      S/P carpal tunnel release     bilateral     S/P hardware removal 01/2014    still with lingering foot pain     S/P shoulder surgery     bilateral     Septic joint (H)     right knee     Venous insufficiency      Venous thrombosis 1999    right axillary vein     Family History   Problem Relation Age of Onset     Arthritis Mother      Hypertension Mother      Cerebrovascular Disease Mother      Obesity Mother      Heart Disease Mother         MI's     Hypertension Father      Respiratory Father         Adult RDS     Diabetes Father         adult     Arthritis Sister      Cancer Sister      Arthritis Sister      Hypertension Sister      Cancer Sister         colon polup     Heart Disease Brother         MI at 54     Hypertension Sister      Lipids Brother      Hypertension Brother      Lipids Sister      Obesity Sister      Obesity Maternal Grandmother      Skin Cancer Maternal Grandmother         skin cancer unknown     Cancer Maternal Grandmother         unknown skin cancer on face     Past Surgical History:   Procedure Laterality Date     AMPUTATE TOE(S)  3/15/2012    Procedure:AMPUTATE TOE(S); Surgeon:RUBEN MOSES; Location:[facility] OR     APPENDECTOMY  1972     ARTHRODESIS FOOT  3/15/2012    Procedure:ARTHRODESIS FOOT; RIGHT TRIPLE ARTHRODESIS, FIFTH TOE AMPUTATION, LATERAL LIGAMENT RECONSTRUCTION, TENDON TRANSFER AND RELEASE [MINI C-ARM, ARTHREX 4.5 AND 6.7 STAPLES, BIOCOMPOSITE TENODESIS SCREWS]; Surgeon:RUBEN MOSES; Location:[facility] OR     C FREEING BOWEL ADHESION,ENTEROLYSIS      1986, 1996, 1999     C TOTAL ABDOM HYSTERECTOMY   1980    + BSO     CHOLECYSTECTOMY       COLOSTOMY  2/7/2012    Procedure:COLOSTOMY; CREATION OF SIGMOID COLOSTOMY AND EXTENSIVE  LYSIS OF ADHESIONS; Surgeon:MONTSERRAT BENDER; Location: OR     GI SURGERY      weakened rectal sphincter with artificial stimulator     LAPAROTOMY, LYSIS ADHESIONS, COMBINED  2/7/2012    Procedure:COMBINED LAPAROTOMY, LYSIS ADHESIONS; Surgeon:MONTSERRAT BENDER; Location:[facility] OR     RELEASE TENDON FOOT  3/15/2012    Procedure:RELEASE TENDON FOOT; Surgeon:SUKHDEEP METZ; Location: OR     REMOVE HARDWARE FOOT  12/13/2012    Procedure: REMOVE HARDWARE FOOT;  RIGHT FOOT REMOVAL OF HARDWARE;  Surgeon: Sukhdeep Metz MD;  Location:  OR     ZZC NONSPECIFIC PROCEDURE      throidectomy     Social History     Tobacco Use     Smoking status: Never Smoker     Smokeless tobacco: Never Used   Substance Use Topics     Alcohol use: Not Currently     Alcohol/week: 0.0 standard drinks     Current Outpatient Medications   Medication Sig Dispense Refill     albuterol (PROAIR HFA/PROVENTIL HFA/VENTOLIN HFA) 108 (90 Base) MCG/ACT inhaler Inhale 2-4 puffs into the lungs every 2 hours as needed for shortness of breath / dyspnea 1 Inhaler 1     amLODIPine (NORVASC) 2.5 MG tablet Take 1 tablet (2.5 mg) by mouth 2 times daily 180 tablet 3     aspirin (SB LOW DOSE ASA EC) 81 MG EC tablet Take 81 mg by mouth daily       atenolol (TENORMIN) 50 MG tablet Take 1 tablet (50 mg) by mouth 2 times daily 180 tablet 3     Azelastine HCl (ASTEPRO NA)        BIOTIN PO Take 1,000 mcg by mouth       Cholecalciferol (VITAMIN D-3 PO) Take 2 tablets by mouth       clotrimazole (LOTRIMIN) 1 % cream Apply topically daily       Continuous Blood Gluc Sensor (FREESTYLE BRANDY 14 DAY SENSOR) [facility] APPLY 1 SENSOR AND CHANGE EVERY 14 DAYS AS DIRECTED 2 each 2     diphenhydrAMINE-acetaminophen (TYLENOL PM)  MG tablet Take 1 tablet by mouth At Bedtime Reported on 3/20/2017       ezetimibe (ZETIA) 10 MG tablet  Take 1 tablet (10 mg) by mouth daily 90 tablet 3     famotidine (PEPCID) 20 MG tablet Take 2 tablets (40 mg) by mouth as needed 180 tablet 3     fenofibrate (TRICOR) 145 MG tablet Take 1 tablet (145 mg) by mouth daily 90 tablet 3     ferrous sulfate (IRON) 325 (65 FE) MG tablet Take 325 mg by mouth daily (with breakfast)       fexofenadine (ALLEGRA) 180 MG tablet Take 180 mg by mouth daily. 120 0     fluticasone (FLONASE) 50 MCG/ACT spray Spray 2 sprays in nostril daily 2 sprays in each nostril qd 1 Bottle 0     furosemide (LASIX) 20 MG tablet TAKE ONE TABLET (20MG) BY MOUTH ONCE DAILY; TAKE AN ADDITIONAL TABLET (20MG) IN THE AFTERNOON ON MONDAY, WEDNESDAY AND FRIDAY 135 tablet 0     gabapentin (NEURONTIN) 100 MG capsule Take 1 capsule (100 mg) by mouth every evening as needed 90 capsule 3     insulin glargine U-300 (TOUJEO) 300 UNIT/ML (1 units dial) pen Inject 8 Units Subcutaneous every morning 15 mL 1     levothyroxine (SYNTHROID) 112 MCG tablet Take 112 mcg by mouth daily Take 112 mcg daily except for Friday takes only 56 mcg.  Brand name Synthroid       lisinopril (ZESTRIL) 40 MG tablet TAKE ONE TABLET BY MOUTH ONCE DAILY 90 tablet 3     metoclopramide (REGLAN) 5 MG tablet Take 1-2 tablets (5-10 mg) by mouth 2 times daily as needed a 120 tablet 0     minoxidil (LONITEN) 2.5 MG tablet One half tablet daily 60 tablet 1     MULTIVITAMINS OR TABS ONE DAILY 100 3     nitroGLYcerin (NITROSTAT) 0.4 MG sublingual tablet Place 1 tablet (0.4 mg) under the tongue every 5 minutes as needed for chest pain (no more than 3 in one hour; after 3rd, call 911.) 25 tablet 3     nystatin (MYCOSTATIN) cream Apply topically daily as needed        nystatin-triamcinolone (MYCOLOG II) cream Apply topically daily as needed       ondansetron (ZOFRAN) 4 MG tablet Take 1 tablet by mouth every 6 hours as needed Reported on 3/20/2017       order for DME Equipment being ordered: Compression stockings - Knee High; 20-30 mmHg compression - note  would like adhesive band to keep the stocking from sliding down 3 each 0     order for DME Donut Pillow 1 each 0     order for DME Equipment being ordered: Oral appliance for sleep apnea 1 Units 0     pantoprazole (PROTONIX) 40 MG EC tablet Take 40 mg by mouth 2 times daily       sitagliptin (JANUVIA) 50 MG tablet Take 1 tablet (50 mg) by mouth daily In the morning       Allergies   Allergen Reactions     Nsaids Difficulty breathing     Increased creatinine     Toradol Difficulty breathing     Shortness of breath     Celecoxib Itching and Rash     Codeine Itching     With higher doses     Crestor [Rosuvastatin] Muscle Pain (Myalgia)     No Clinical Screening - See Comments Itching     Fragrance     Vioxx Other (See Comments)     Heart races     Conjugated Estrogens Rash     Sulfa Drugs Rash       Reviewed and updated as needed this visit by clinical staff and provider     Review of Systems   Detailed as above       Objective           Vitals:  No vitals were obtained today due to virtual visit.    Physical Exam     GENERAL: no distress  RESP: No audible wheeze, cough  SKIN: Visible skin clear. No significant rash, abnormal pigmentation or lesions.  NEURO:Mentation and speech appropriate for age.  PSYCH: Mentation appears normal, affect normal/bright, judgement and insight intact, normal speech            Assessment & Plan     Dysuria  Will await UA. Home care will be going to her house for a sample   Last 2 cultures were negative             No follow-ups on file.    MAGALY Alberto [facility]      Video-Visit Details    Type of service:  telephone  7 minutes

## 2020-12-18 NOTE — RESULT ENCOUNTER NOTE
Gerard Gaspar,    I have had the opportunity to review your recent results and an interpretation is as follows:  Your urinalysis was positive and empiric treatment with antibiotics is recommended    Start Keflex 500 mg twice per day x 7 days while we await urine culture     Sincerely,  Shola Benoit MD

## 2020-12-18 NOTE — TELEPHONE ENCOUNTER
Can we call Chasity Hodgson and let her know that     Gerard Gaspar,    I have had the opportunity to review your recent results and an interpretation is as follows:  Your urinalysis was positive and empiric treatment with antibiotics is recommended    Start Keflex 500 mg twice per day x 7 days while we await urine culture     Sincerely,  Shola Benoit MD

## 2020-12-19 LAB
BACTERIA SPEC CULT: NORMAL
BACTERIA SPEC CULT: NORMAL
Lab: NORMAL
SPECIMEN SOURCE: NORMAL

## 2020-12-21 ENCOUNTER — TELEPHONE (OUTPATIENT)
Dept: FAMILY MEDICINE | Facility: CLINIC | Age: 75
End: 2020-12-21

## 2020-12-21 NOTE — TELEPHONE ENCOUNTER
[facility] now requests orders and shares plan of care/discharge summaries for some patients through [employer].  Please REPLY TO THIS MESSAGE OR ROUTE BACK TO THE AUTHOR in order to give authorization for orders when needed.  This is considered a verbal order, you will still receive a faxed copy of orders for signature.  Thank you for your assistance in improving collaboration for our patients.    ORDER    OT 1w1, 2w2 for UE HEP, ADL/IADL safety, energy conservation    Evelyn Christianson, OTR/L  [phone]

## 2021-01-18 ENCOUNTER — OFFICE VISIT (OUTPATIENT)
Dept: FAMILY MEDICINE | Facility: CLINIC | Age: 76
End: 2021-01-18
Payer: MEDICARE

## 2021-01-18 VITALS
WEIGHT: 185 LBS | HEIGHT: 60 IN | SYSTOLIC BLOOD PRESSURE: 169 MMHG | BODY MASS INDEX: 36.32 KG/M2 | DIASTOLIC BLOOD PRESSURE: 75 MMHG | TEMPERATURE: 98.7 F | OXYGEN SATURATION: 100 % | HEART RATE: 55 BPM

## 2021-01-18 DIAGNOSIS — R60.0 EDEMA OF RIGHT LOWER EXTREMITY: ICD-10-CM

## 2021-01-18 DIAGNOSIS — I10 BENIGN ESSENTIAL HYPERTENSION: ICD-10-CM

## 2021-01-18 DIAGNOSIS — R30.0 DYSURIA: Primary | ICD-10-CM

## 2021-01-18 LAB
ALBUMIN UR-MCNC: NEGATIVE MG/DL
APPEARANCE UR: CLEAR
BACTERIA #/AREA URNS HPF: ABNORMAL /HPF
BILIRUB UR QL STRIP: NEGATIVE
COLOR UR AUTO: YELLOW
ERYTHROCYTE [DISTWIDTH] IN BLOOD BY AUTOMATED COUNT: 12.8 % (ref 10–15)
GLUCOSE UR STRIP-MCNC: NEGATIVE MG/DL
HCT VFR BLD AUTO: 36.8 % (ref 35–47)
HGB BLD-MCNC: 11.6 G/DL (ref 11.7–15.7)
HGB UR QL STRIP: NEGATIVE
KETONES UR STRIP-MCNC: NEGATIVE MG/DL
LEUKOCYTE ESTERASE UR QL STRIP: ABNORMAL
MCH RBC QN AUTO: 31.1 PG (ref 26.5–33)
MCHC RBC AUTO-ENTMCNC: 31.5 G/DL (ref 31.5–36.5)
MCV RBC AUTO: 99 FL (ref 78–100)
NITRATE UR QL: NEGATIVE
NON-SQ EPI CELLS #/AREA URNS LPF: ABNORMAL /LPF
PH UR STRIP: 5 PH (ref 5–7)
PLATELET # BLD AUTO: 125 10E9/L (ref 150–450)
RBC # BLD AUTO: 3.73 10E12/L (ref 3.8–5.2)
RBC #/AREA URNS AUTO: ABNORMAL /HPF
SOURCE: ABNORMAL
SP GR UR STRIP: 1.02 (ref 1–1.03)
UROBILINOGEN UR STRIP-ACNC: 0.2 EU/DL (ref 0.2–1)
WBC # BLD AUTO: 9 10E9/L (ref 4–11)
WBC #/AREA URNS AUTO: ABNORMAL /HPF

## 2021-01-18 PROCEDURE — 85027 COMPLETE CBC AUTOMATED: CPT | Performed by: INTERNAL MEDICINE

## 2021-01-18 PROCEDURE — 81001 URINALYSIS AUTO W/SCOPE: CPT | Performed by: INTERNAL MEDICINE

## 2021-01-18 PROCEDURE — 36415 COLL VENOUS BLD VENIPUNCTURE: CPT | Performed by: INTERNAL MEDICINE

## 2021-01-18 PROCEDURE — 99214 OFFICE O/P EST MOD 30 MIN: CPT | Performed by: INTERNAL MEDICINE

## 2021-01-18 PROCEDURE — 80053 COMPREHEN METABOLIC PANEL: CPT | Performed by: INTERNAL MEDICINE

## 2021-01-18 PROCEDURE — 87086 URINE CULTURE/COLONY COUNT: CPT | Performed by: INTERNAL MEDICINE

## 2021-01-18 ASSESSMENT — MIFFLIN-ST. JEOR: SCORE: 1260.65

## 2021-01-18 NOTE — PATIENT INSTRUCTIONS
(R30.0) Dysuria  (primary encounter diagnosis)  Comment: we will check urinalysis today and urine culture and treat empirically  Plan: UA reflex to Microscopic and Culture, Urine         Microscopic, Urine Culture Aerobic Bacterial            (I10) Benign essential hypertension  Comment: We will check comprehensive metabolic panel, complete blood counts today and plan to reduce sodium to less than 2,000 mg/ 24 hours and continue furosemide  Plan: Comprehensive metabolic panel (BMP + Alb, Alk         Phos, ALT, AST, Total. Bili, TP), CBC with         platelets             Right lower extremity edema  Comment; Recommend ultrasound to evaluate for deep vein thrombosis - [facility] phone #[phone]

## 2021-01-18 NOTE — PROGRESS NOTES
Raheem Cochran is a 75 year old who presents to clinic today for the following health issues     HPI     Yeast infection   Has had resolution of the yeast infection, but still having some burning on the skin at site of infection.  Does not believe needs any further anti-fungal medications at this time.  Right side lower extremity edema   Has had worsening lower extremity edema.  While she was at rehab she had improvement in her swelling and blood pressure and in fact reduced her dose of atenolol, but since being home her blood pressure and swelling have worsened.  She is watching her diet.  She has been trying to avoid sodium.  She is taking furosemide 20 mg twice per day in additoin to current blood pressure medications.            Review of Systems   Constitutional, HEENT, cardiovascular, pulmonary, GI,  - right flank pain , musculoskeletal - recent left shoulder replacement - will start physical therapy next week, neuro, skin, endocrine and psych systems are negative, except as otherwise noted.      Objective    BP (!) 169/75 (BP Location: Right arm, Cuff Size: Adult Large)   Pulse 55   Temp 98.7  F (37.1  C) (Temporal)   Ht 1.524 m (5')   Wt 83.9 kg (185 lb)   SpO2 100%   Breastfeeding No   BMI 36.13 kg/m    Body mass index is 36.13 kg/m .  Physical Exam   GENERAL: healthy, alert and no distress  EYES: Eyes grossly normal to inspectio   NECK: no adenopathy, no asymmetr   RESP: lungs clear to auscultation - no rales, rhonchi or wheezes  CV: regular rate and rhythm, normal S1 S2, no S3 or S4, no murmur, click or rub, 2+ edema R>L  ABDOMEN: soft, nontender, no hepatosplenomegaly, no masses and bowel sounds normal  MS: limited range of motion of left shoulder  SKIN: no suspicious lesions or rashes  NEURO: Normal strength and tone, mentation intact and speech normal  PSYCH: mentation appears normal, affect normal/bright      Patient Instructions   (R30.0) Dysuria  (primary encounter  diagnosis)  Comment: we will check urinalysis today and urine culture and treat empirically  Plan: UA reflex to Microscopic and Culture, Urine         Microscopic, Urine Culture Aerobic Bacterial            (I10) Benign essential hypertension  Comment: We will check comprehensive metabolic panel, complete blood counts today and plan to reduce sodium to less than 2,000 mg/ 24 hours and continue furosemide  Plan: Comprehensive metabolic panel (BMP + Alb, Alk         Phos, ALT, AST, Total. Bili, TP), CBC with         platelets             Right lower extremity edema  Comment; Recommend ultrasound to evaluate for deep vein thrombosis - [facility] phone #[phone]

## 2021-01-19 ENCOUNTER — TELEPHONE (OUTPATIENT)
Dept: FAMILY MEDICINE | Facility: CLINIC | Age: 76
End: 2021-01-19

## 2021-01-19 DIAGNOSIS — D64.9 NORMOCYTIC ANEMIA: Primary | ICD-10-CM

## 2021-01-19 LAB
ALBUMIN SERPL-MCNC: 3.7 G/DL (ref 3.4–5)
ALP SERPL-CCNC: 39 U/L (ref 40–150)
ALT SERPL W P-5'-P-CCNC: 22 U/L (ref 0–50)
ANION GAP SERPL CALCULATED.3IONS-SCNC: 8 MMOL/L (ref 3–14)
AST SERPL W P-5'-P-CCNC: 34 U/L (ref 0–45)
BILIRUB SERPL-MCNC: 0.3 MG/DL (ref 0.2–1.3)
BUN SERPL-MCNC: 28 MG/DL (ref 7–30)
CALCIUM SERPL-MCNC: 9.4 MG/DL (ref 8.5–10.1)
CHLORIDE SERPL-SCNC: 108 MMOL/L (ref 94–109)
CO2 SERPL-SCNC: 25 MMOL/L (ref 20–32)
CREAT SERPL-MCNC: 1.03 MG/DL (ref 0.52–1.04)
GFR SERPL CREATININE-BSD FRML MDRD: 53 ML/MIN/{1.73_M2}
GLUCOSE SERPL-MCNC: 151 MG/DL (ref 70–99)
POTASSIUM SERPL-SCNC: 4.5 MMOL/L (ref 3.4–5.3)
PROT SERPL-MCNC: 7.4 G/DL (ref 6.8–8.8)
SODIUM SERPL-SCNC: 141 MMOL/L (ref 133–144)

## 2021-01-20 LAB
BACTERIA SPEC CULT: NORMAL
Lab: NORMAL
SPECIMEN SOURCE: NORMAL

## 2021-01-20 NOTE — TELEPHONE ENCOUNTER
Can we call Chasity Hodgson and let her know that     Gerard Cochran,    I had the opportunity to review your recent labs and a summary of your labs reads as follows:    Your complete blood counts shows evidence of anemia - we should recheck this again in 1 month as well as check your iron studies  Your comprehensive metabolic panel showed normal renal function, normal liver function, and normal fasting blood glucose indicating no evidence of diabetes mellitus.  Your urinalysis shows trace leukocyte esterase, but no concern for acut infection - urine culture is pending    Sincerely,  Shola Benoit MD

## 2021-01-20 NOTE — RESULT ENCOUNTER NOTE
Gerard Cochran,    I had the opportunity to review your recent labs and a summary of your labs reads as follows:    Your complete blood counts shows evidence of anemia - we should recheck this again in 1 month as well as check your iron studies  Your comprehensive metabolic panel showed normal renal function, normal liver function, and normal fasting blood glucose indicating no evidence of diabetes mellitus.  Your urinalysis shows trace leukocyte esterase, but no concern for acute infection - urine culture is pending    Sincerely,  Shola Benoit MD

## 2021-01-22 ENCOUNTER — HOSPITAL ENCOUNTER (OUTPATIENT)
Dept: ULTRASOUND IMAGING | Facility: CLINIC | Age: 76
Discharge: HOME | End: 2021-01-22
Attending: INTERNAL MEDICINE | Admitting: INTERNAL MEDICINE
Payer: MEDICARE

## 2021-01-22 DIAGNOSIS — R60.0 EDEMA OF RIGHT LOWER EXTREMITY: ICD-10-CM

## 2021-01-22 PROCEDURE — 93971 EXTREMITY STUDY: CPT | Mod: RT

## 2021-01-23 NOTE — RESULT ENCOUNTER NOTE
Gerard Gaspar,    I have had the opportunity to review your recent results and an interpretation is as follows:  Your urine culture ultimately showed no concern for acute infection      Sincerely,  Shola Benoit MD

## 2021-01-25 NOTE — RESULT ENCOUNTER NOTE
Gerard Gaspar,    I have had the opportunity to review your recent results and an interpretation is as follows:  Your follow-up ultrasound did not show any evidence of a blood clot    Sincerely,  Shola Benoit MD

## 2021-02-02 ENCOUNTER — MYC MEDICAL ADVICE (OUTPATIENT)
Dept: FAMILY MEDICINE | Facility: CLINIC | Age: 76
End: 2021-02-02

## 2021-02-02 DIAGNOSIS — Z20.822 EXPOSURE TO COVID-19 VIRUS: Primary | ICD-10-CM

## 2021-02-09 ENCOUNTER — TRANSFERRED RECORDS (OUTPATIENT)
Dept: HEALTH INFORMATION MANAGEMENT | Facility: CLINIC | Age: 76
End: 2021-02-09

## 2021-02-15 ENCOUNTER — TELEPHONE (OUTPATIENT)
Dept: FAMILY MEDICINE | Facility: CLINIC | Age: 76
End: 2021-02-15

## 2021-02-15 ENCOUNTER — OFFICE VISIT (OUTPATIENT)
Dept: DERMATOLOGY | Facility: CLINIC | Age: 76
End: 2021-02-15
Payer: MEDICARE

## 2021-02-15 DIAGNOSIS — R89.9 ABNORMAL LABORATORY TEST RESULT: ICD-10-CM

## 2021-02-15 DIAGNOSIS — L23.5 ALLERGIC DERMATITIS DUE TO OTHER CHEMICAL PRODUCT: ICD-10-CM

## 2021-02-15 DIAGNOSIS — T14.8XXA ABRASION: ICD-10-CM

## 2021-02-15 DIAGNOSIS — L72.0 MILIA: Primary | ICD-10-CM

## 2021-02-15 DIAGNOSIS — A80.9 POLIOMYELITIS: ICD-10-CM

## 2021-02-15 PROCEDURE — 99214 OFFICE O/P EST MOD 30 MIN: CPT | Performed by: DERMATOLOGY

## 2021-02-15 RX ORDER — TRETINOIN 0.25 MG/G
CREAM TOPICAL
Qty: 20 G | Refills: 3 | Status: SHIPPED | OUTPATIENT
Start: 2021-02-15 | End: 2023-12-11

## 2021-02-15 RX ORDER — DAPAGLIFLOZIN 5 MG/1
5 TABLET, FILM COATED ORAL DAILY
COMMUNITY
End: 2021-05-03

## 2021-02-15 ASSESSMENT — PAIN SCALES - GENERAL: PAINLEVEL: NO PAIN (0)

## 2021-02-15 NOTE — PROGRESS NOTES
[facility] Dermatology Note  Encounter Date: Feb 15, 2021  Office Visit     Dermatology Problem List:  1. Female pattern hair loss  - Laser comb up to 3x weekly and free and clear shampoo  - Previously on 1/2 tab of minoxidil 2.5 mg daily, discontinued secondary to elevated creatinine 10/2020, has since stabilized  - Dry scalp resolved with olive oil and OTC hydrocortisone  2. Tinea corporis or candida/yeast infection - resolved  - Nystatin powder, OTC lotrimin cream   3. Rosacea - stable  - Vanicream  4. Venous stasis dermatitis - stable  - Vanicream  5. Allergic contact dermatitis - stable  - Uses fragrance-free products  - Patch testing revealed mild reaction to [place]als[place] of [place], fragrance mix, and a strong reaction to paraphenylenediamine.   6. Xerosis cutis  -Recommend continuing use of a gentle, fragrance-free emollient based moisturizer such as Vanicream or CeraVe.   7. Abrasion , right upper arm 02/15/2021.   - Monitor, photo taken today.   8. Milia, right lateral eye  - Tretinoin 0.025% cream  ____________________________________________    Assessment & Plan:    1. Female pattern hair loss  Doing well despite discontinuation of oral minoxidil. Will continue to avoid adding new topical therapies due to history of allergic contact dermatitis.   - Continue to use laser headband or comb at least 3 times per week   - Continue to shampoo hair daily with free and clear shampoo     2. Neoplasm of uncertain etiology, right upper arm: abrasion, unclear if this is all this represents  - Clinical picture and exam consistent with a healing abrasion.   - Recommended frequent emollient use.   - Advised the patient to return to clinic if not resolving in one month. Photo taken.     3. Milia, right lateral eye  - Benign nature of the lesion discussed.   - Provided the patient with a prescription for tretinoin 0.025% cream to use on the area once daily. Printed prescription provided.     4. History of  allergic contact dermatitis - stable  - Uses fragrance-free products  - Patch testing revealed mild reaction to [place]alsam of [place], fragrance mix, and a strong reaction to paraphenylenediamine.      Follow-up in 6 months, earlier for new or changing lesions.     Staff and Medical Student:     Shanti Marion, MS3  Medical Student on Dermatology Service    Staff Physician:  I was present with the medical student who participated in the service and in the documentation of the note. I have verified the history and personally performed the physical exam and medical decision making. I agree with the assessment and plan of care as documented in the note.     Renetta Meyer MD  Professor and Chair  Department of Dermatology  [facility]: Phone: [phone], Fax:[phone]  [facility]: Phone: [phone], Fax: [phone]        ____________________________________________    CC: Hair Loss (Sammie is here today for a hair loss follow up )    HPI:  Ms. Chasity Hodgson is a 75 year old female who presents as a follow-up patient for hair loss, diagnosed as female patterned hair loss. She had been doing well on 1/2 tab of minoxidil 2.5 mg daily, laser headband twice weekly, free and clear shampoo. She was later advised to discontinue the minoxidil secondary to an elevated creatinine at 1.2. She had this rechecked on 11/30/2020, 1.03; 01/18/2021 1.03     - Last seen in clinic on 10/05/2020  - Current tx: laser comb twice weekly, free and clear shampoo. Her left arm has limited range of motion which interferes with how often she is able to successfully use the laser comb.  - Scalp pain: No  - Scalp burning: No  - Scalp itching: Occasionally, she relates this to the weather   - Eyebrow or eyelash changes: None  - Nail changes: Stronger!  - Overall course: She felt her symptoms had been doing very well, is no longer losing as  much hair as she had been.     She does express concern for a non-healing lesion x1 week on her right upper arm. She poked the area for a glucose check, and it has since scabbed over. It is asymptomatic, but this has never occurred previously.  There is also what she describes as a skin tag on her lateral right eye that she would like to have evaluated.     No other concerns today and in general state of health, seen by her providers regularly. She has been seeing her PCP for retained fluids, thought to be related to a high-sodium diet. She uses a walker due to some knee pain, and follows an exercise regimen per PT.     Labs reviewed: 10/05/2020 CMP; 11/30/2020 creatinine; 01/18/2021 creatinine    Physical Exam:  Vitals: There were no vitals taken for this visit.  GEN: Well developed, well-nourished, in no acute distress, in a pleasant mood.    SKIN: Focused exam of the scalp, face, right upper arm, nails.   Ceron type: I  - No perifollicular erythema  - No scaling of the scalp   - Eyelashes and eyebrows normal density  - Nails no pitting or dystrophy  - In comparison to prior photographs, improved overall, improved compared to 2019 photographs as well  - 1 cm erythematous macule with central hemorrhagic crust on the right upper arm  - Skin colored 2 mm papule with central punctum on the right lateral eye    Medications:  Current Outpatient Medications   Medication     albuterol (PROAIR HFA/PROVENTIL HFA/VENTOLIN HFA) 108 (90 Base) MCG/ACT inhaler     amLODIPine (NORVASC) 2.5 MG tablet     aspirin (SB LOW DOSE ASA EC) 81 MG EC tablet     atenolol (TENORMIN) 50 MG tablet     Cholecalciferol (VITAMIN D-3 PO)     clotrimazole (LOTRIMIN) 1 % cream     Continuous Blood Gluc Sensor (FREESTYLE BRANDY 14 DAY SENSOR) MISC     dapagliflozin (FARXIGA) 5 MG TABS tablet     diphenhydrAMINE-acetaminophen (TYLENOL PM)  MG tablet     ezetimibe (ZETIA) 10 MG tablet     famotidine (PEPCID) 20 MG tablet     fenofibrate  (TRICOR) 145 MG tablet     ferrous sulfate (IRON) 325 (65 FE) MG tablet     fexofenadine (ALLEGRA) 180 MG tablet     fluticasone (FLONASE) 50 MCG/ACT spray     furosemide (LASIX) 20 MG tablet     gabapentin (NEURONTIN) 100 MG capsule     insulin glargine U-300 (TOUJEO) 300 UNIT/ML (1 units dial) pen     levothyroxine (SYNTHROID) 112 MCG tablet     lisinopril (ZESTRIL) 40 MG tablet     metoclopramide (REGLAN) 5 MG tablet     MULTIVITAMINS OR TABS     nitroGLYcerin (NITROSTAT) 0.4 MG sublingual tablet     nystatin (MYCOSTATIN) cream     nystatin-triamcinolone (MYCOLOG II) cream     ondansetron (ZOFRAN) 4 MG tablet     order for DME     order for DME     order for DME     pantoprazole (PROTONIX) 40 MG EC tablet     sitagliptin (JANUVIA) 50 MG tablet     Azelastine HCl (ASTEPRO NA)     BIOTIN PO     minoxidil (LONITEN) 2.5 MG tablet     No current facility-administered medications for this visit.       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)     Anemia due to blood loss, acute     ACP (advance care planning)     Postsurgical hypothyroidism     Type 2 diabetes, HbA1c goal < 7% (H)     Gastroparesis     Papillary carcinoma of thyroid (H)     Alopecia     Pulmonary nodule     Esophageal reflux     Dermatitis     Acne rosacea     Diabetic gastroparesis (H)     Poliomyelitis     Left knee pain     Carotid stenosis     Right shoulder pain     Type 2 diabetes mellitus with other specified complication (H)     Insufficiency, arterial, peripheral (H)     Allergic dermatitis due to other chemical product     Normocytic anemia     Morbid obesity (H)     Mild major depression (H)     CKD (chronic kidney disease) stage 3, GFR 30-59 ml/min      Renal artery stenosis (H)     Past Medical History:   Diagnosis Date     Abdominal adhesions 1984, 96,99    s/p lysis     Allergic rhinitis, cause unspecified      Carotid stenosis      CPAP (continuous positive airway pressure) dependence      Diabetic gastroparesis (H)      Diet-controlled type 2 diabetes mellitus (H)      DVT of axillary vein, acute right (H)      Fibromyalgia      Gastro-oesophageal reflux disease      Hernia of unspecified site of abdominal cavity without mention of obstruction or gangrene     bilateral     HTN (hypertension)      Hypertriglyceridemia      Obstructive sleep apnea      ANNETTE (obstructive sleep apnea)      Papillary carcinoma of thyroid (H)     s/p thyroidectomy - Ruegemer     PE (pulmonary embolism)      Poliomyelitis     poor circulation right leg     Postsurgical hypothyroidism     s/p papillary thryoid cancer - Ruegemer     Pulmonary embolism (H)      Rosacea      S/P carpal tunnel release     bilateral     S/P hardware removal 01/2014    still with lingering foot pain     S/P shoulder surgery     bilateral     Septic joint (H)     right knee     Venous insufficiency      Venous thrombosis 1999    right axillary vein     Past Surgical History:   Procedure Laterality Date     AMPUTATE TOE(S)  3/15/2012     APPENDECTOMY  1972     ARTHRODESIS FOOT  3/15/2012     C FREEING BOWEL ADHESION,ENTEROLYSIS       C TOTAL ABDOM HYSTERECTOMY  1980     CHOLECYSTECTOMY       COLOSTOMY  2/7/2012     GI SURGERY       LAPAROTOMY, LYSIS ADHESIONS, COMBINED  2/7/2012     RELEASE TENDON FOOT  3/15/2012     REMOVE HARDWARE FOOT  12/13/2012     ZZC NONSPECIFIC PROCEDURE       CC Shola Benoit MD  [address]  [place] [address] on close of this encounter.

## 2021-02-15 NOTE — TELEPHONE ENCOUNTER
gabapentin (NEURONTIN) 100 MG capsule      Last Written Prescription Date:  2/06/20  Last Fill Quantity: 90 capsule,   # refills: 3  Last Office Visit: 1/18/2021 sparkle Benoit  Future Office visit:       Routing refill request to provider for review/approval because:  Drug not on the [facility], [facility] or [facility] refill protocol or controlled substance

## 2021-02-15 NOTE — LETTER
2/15/2021       RE: Chasity Hodgson  [address] [address] [place] [address]     Dear Colleague,    Thank you for referring your patient, Chasity Hodgson, to the [facility] DERMATOLOGY [facility]IC [place] [facility]. Please see a copy of my visit note below.    [facility] Dermatology Note  Encounter Date: Feb 15, 2021  Office Visit     Dermatology Problem List:  1. Female pattern hair loss  - Laser comb up to 3x weekly and free and clear shampoo  - Previously on 1/2 tab of minoxidil 2.5 mg daily, discontinued secondary to elevated creatinine 10/2020, has since stabilized  - Dry scalp resolved with olive oil and OTC hydrocortisone  2. Tinea corporis or candida/yeast infection - resolved  - Nystatin powder, OTC lotrimin cream   3. Rosacea - stable  - Vanicream  4. Venous stasis dermatitis - stable  - Vanicream  5. Allergic contact dermatitis - stable  - Uses fragrance-free products  - Patch testing revealed mild reaction to [place]alsam of [place], fragrance mix, and a strong reaction to paraphenylenediamine.   6. Xerosis cutis  -Recommend continuing use of a gentle, fragrance-free emollient based moisturizer such as Vanicream or CeraVe.   7. Abrasion , right upper arm 02/15/2021.   - Monitor, photo taken today.   8. Milia, right lateral eye  - Tretinoin 0.025% cream  ____________________________________________    Assessment & Plan:    1. Female pattern hair loss  Doing well despite discontinuation of oral minoxidil. Will continue to avoid adding new topical therapies due to history of allergic contact dermatitis.   - Continue to use laser headband or comb at least 3 times per week   - Continue to shampoo hair daily with free and clear shampoo     2. Neoplasm of uncertain etiology, right upper arm: abrasion, unclear if this is all this represents  - Clinical picture and exam consistent with a healing abrasion.   - Recommended frequent emollient use.   -  Advised the patient to return to clinic if not resolving in one month. Photo taken.     3. Milia, right lateral eye  - Benign nature of the lesion discussed.   - Provided the patient with a prescription for tretinoin 0.025% cream to use on the area once daily. Printed prescription provided.     4. History of allergic contact dermatitis - stable  - Uses fragrance-free products  - Patch testing revealed mild reaction to [place]als[place] of [place], fragrance mix, and a strong reaction to paraphenylenediamine.      Follow-up in 6 months, earlier for new or changing lesions.     Staff and Medical Student:     Shanti Marion, MS3  Medical Student on Dermatology Service    Staff Physician:  I was present with the medical student who participated in the service and in the documentation of the note. I have verified the history and personally performed the physical exam and medical decision making. I agree with the assessment and plan of care as documented in the note.     Renetta Meyer MD  Professor and Chair  Department of Dermatology  [facility]: Phone: [phone], Fax:[phone]  [facility]: Phone: [phone], Fax: [phone]        ____________________________________________    CC: Hair Loss (Sammie is here today for a hair loss follow up )    HPI:  Ms. Chasity Hodgson is a 75 year old female who presents as a follow-up patient for hair loss, diagnosed as female patterned hair loss. She had been doing well on 1/2 tab of minoxidil 2.5 mg daily, laser headband twice weekly, free and clear shampoo. She was later advised to discontinue the minoxidil secondary to an elevated creatinine at 1.2. She had this rechecked on 11/30/2020, 1.03; 01/18/2021 1.03     - Last seen in clinic on 10/05/2020  - Current tx: laser comb twice weekly, free and clear shampoo. Her left arm has limited range of motion which interferes with how  often she is able to successfully use the laser comb.  - Scalp pain: No  - Scalp burning: No  - Scalp itching: Occasionally, she relates this to the weather   - Eyebrow or eyelash changes: None  - Nail changes: Stronger!  - Overall course: She felt her symptoms had been doing very well, is no longer losing as much hair as she had been.     She does express concern for a non-healing lesion x1 week on her right upper arm. She poked the area for a glucose check, and it has since scabbed over. It is asymptomatic, but this has never occurred previously.  There is also what she describes as a skin tag on her lateral right eye that she would like to have evaluated.     No other concerns today and in general state of health, seen by her providers regularly. She has been seeing her PCP for retained fluids, thought to be related to a high-sodium diet. She uses a walker due to some knee pain, and follows an exercise regimen per PT.     Labs reviewed: 10/05/2020 CMP; 11/30/2020 creatinine; 01/18/2021 creatinine    Physical Exam:  Vitals: There were no vitals taken for this visit.  GEN: Well developed, well-nourished, in no acute distress, in a pleasant mood.    SKIN: Focused exam of the scalp, face, right upper arm, nails.   Ceron type: I  - No perifollicular erythema  - No scaling of the scalp   - Eyelashes and eyebrows normal density  - Nails no pitting or dystrophy  - In comparison to prior photographs, improved overall, improved compared to 2019 photographs as well  - 1 cm erythematous macule with central hemorrhagic crust on the right upper arm  - Skin colored 2 mm papule with central punctum on the right lateral eye    Medications:  Current Outpatient Medications   Medication     albuterol (PROAIR HFA/PROVENTIL HFA/VENTOLIN HFA) 108 (90 Base) MCG/ACT inhaler     amLODIPine (NORVASC) 2.5 MG tablet     aspirin (SB LOW DOSE ASA EC) 81 MG EC tablet     atenolol (TENORMIN) 50 MG tablet     Cholecalciferol (VITAMIN D-3  PO)     clotrimazole (LOTRIMIN) 1 % cream     Continuous Blood Gluc Sensor (FREESTYLE BRANDY 14 DAY SENSOR) MISC     dapagliflozin (FARXIGA) 5 MG TABS tablet     diphenhydrAMINE-acetaminophen (TYLENOL PM)  MG tablet     ezetimibe (ZETIA) 10 MG tablet     famotidine (PEPCID) 20 MG tablet     fenofibrate (TRICOR) 145 MG tablet     ferrous sulfate (IRON) 325 (65 FE) MG tablet     fexofenadine (ALLEGRA) 180 MG tablet     fluticasone (FLONASE) 50 MCG/ACT spray     furosemide (LASIX) 20 MG tablet     gabapentin (NEURONTIN) 100 MG capsule     insulin glargine U-300 (TOUJEO) 300 UNIT/ML (1 units dial) pen     levothyroxine (SYNTHROID) 112 MCG tablet     lisinopril (ZESTRIL) 40 MG tablet     metoclopramide (REGLAN) 5 MG tablet     MULTIVITAMINS OR TABS     nitroGLYcerin (NITROSTAT) 0.4 MG sublingual tablet     nystatin (MYCOSTATIN) cream     nystatin-triamcinolone (MYCOLOG II) cream     ondansetron (ZOFRAN) 4 MG tablet     order for DME     order for DME     order for DME     pantoprazole (PROTONIX) 40 MG EC tablet     sitagliptin (JANUVIA) 50 MG tablet     Azelastine HCl (ASTEPRO NA)     BIOTIN PO     minoxidil (LONITEN) 2.5 MG tablet     No current facility-administered medications for this visit.       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)     Anemia due to blood loss, acute     ACP (advance care planning)     Postsurgical hypothyroidism     Type 2 diabetes, HbA1c goal < 7% (H)     Gastroparesis     Papillary carcinoma of thyroid (H)     Alopecia     Pulmonary nodule     Esophageal reflux     Dermatitis     Acne rosacea     Diabetic gastroparesis (H)     Poliomyelitis     Left knee pain     Carotid stenosis     Right  shoulder pain     Type 2 diabetes mellitus with other specified complication (H)     Insufficiency, arterial, peripheral (H)     Allergic dermatitis due to other chemical product     Normocytic anemia     Morbid obesity (H)     Mild major depression (H)     CKD (chronic kidney disease) stage 3, GFR 30-59 ml/min     Renal artery stenosis (H)     Past Medical History:   Diagnosis Date     Abdominal adhesions 1984, 96,99    s/p lysis     Allergic rhinitis, cause unspecified      Carotid stenosis      CPAP (continuous positive airway pressure) dependence      Diabetic gastroparesis (H)      Diet-controlled type 2 diabetes mellitus (H)      DVT of axillary vein, acute right (H)      Fibromyalgia      Gastro-oesophageal reflux disease      Hernia of unspecified site of abdominal cavity without mention of obstruction or gangrene     bilateral     HTN (hypertension)      Hypertriglyceridemia      Obstructive sleep apnea      ANNETTE (obstructive sleep apnea)      Papillary carcinoma of thyroid (H)     s/p thyroidectomy - Ruegemer     PE (pulmonary embolism)      Poliomyelitis     poor circulation right leg     Postsurgical hypothyroidism     s/p papillary thryoid cancer - Ruegemer     Pulmonary embolism (H)      Rosacea      S/P carpal tunnel release     bilateral     S/P hardware removal 01/2014    still with lingering foot pain     S/P shoulder surgery     bilateral     Septic joint (H)     right knee     Venous insufficiency      Venous thrombosis 1999    right axillary vein     Past Surgical History:   Procedure Laterality Date     AMPUTATE TOE(S)  3/15/2012     APPENDECTOMY  1972     ARTHRODESIS FOOT  3/15/2012     C FREEING BOWEL ADHESION,ENTEROLYSIS       C TOTAL ABDOM HYSTERECTOMY  1980     CHOLECYSTECTOMY       COLOSTOMY  2/7/2012     GI SURGERY       LAPAROTOMY, LYSIS ADHESIONS, COMBINED  2/7/2012     RELEASE TENDON FOOT  3/15/2012     REMOVE HARDWARE FOOT  12/13/2012     ZZC NONSPECIFIC PROCEDURE       CC Shola  Leonides Benoit MD  [address] [facility] [address]  [place] [address] on close of this encounter.      Again, thank you for allowing me to participate in the care of your patient.      Sincerely,    Renetta Meyer MD

## 2021-02-16 DIAGNOSIS — D64.9 NORMOCYTIC ANEMIA: ICD-10-CM

## 2021-02-16 DIAGNOSIS — Z20.822 EXPOSURE TO COVID-19 VIRUS: ICD-10-CM

## 2021-02-16 LAB
ERYTHROCYTE [DISTWIDTH] IN BLOOD BY AUTOMATED COUNT: 12.4 % (ref 10–15)
FOLATE SERPL-MCNC: 64.6 NG/ML
HCT VFR BLD AUTO: 34.6 % (ref 35–47)
HGB BLD-MCNC: 11.5 G/DL (ref 11.7–15.7)
MCH RBC QN AUTO: 31.3 PG (ref 26.5–33)
MCHC RBC AUTO-ENTMCNC: 33.2 G/DL (ref 31.5–36.5)
MCV RBC AUTO: 94 FL (ref 78–100)
PLATELET # BLD AUTO: 232 10E9/L (ref 150–450)
RBC # BLD AUTO: 3.68 10E12/L (ref 3.8–5.2)
RETICS # AUTO: 81.2 10E9/L (ref 25–95)
RETICS/RBC NFR AUTO: 2.1 % (ref 0.5–2)
VIT B12 SERPL-MCNC: 1286 PG/ML (ref 193–986)
WBC # BLD AUTO: 7.8 10E9/L (ref 4–11)

## 2021-02-16 PROCEDURE — 83540 ASSAY OF IRON: CPT | Performed by: INTERNAL MEDICINE

## 2021-02-16 PROCEDURE — 84165 PROTEIN E-PHORESIS SERUM: CPT | Performed by: PATHOLOGY

## 2021-02-16 PROCEDURE — 82607 VITAMIN B-12: CPT | Performed by: INTERNAL MEDICINE

## 2021-02-16 PROCEDURE — 83550 IRON BINDING TEST: CPT | Performed by: INTERNAL MEDICINE

## 2021-02-16 PROCEDURE — 82746 ASSAY OF FOLIC ACID SERUM: CPT | Performed by: INTERNAL MEDICINE

## 2021-02-16 PROCEDURE — 86769 SARS-COV-2 COVID-19 ANTIBODY: CPT | Performed by: INTERNAL MEDICINE

## 2021-02-16 PROCEDURE — 85027 COMPLETE CBC AUTOMATED: CPT | Performed by: INTERNAL MEDICINE

## 2021-02-16 PROCEDURE — 85045 AUTOMATED RETICULOCYTE COUNT: CPT | Performed by: INTERNAL MEDICINE

## 2021-02-16 PROCEDURE — 99N1036 PR STATISTIC TOTAL PROTEIN: Performed by: INTERNAL MEDICINE

## 2021-02-16 PROCEDURE — 36415 COLL VENOUS BLD VENIPUNCTURE: CPT | Performed by: INTERNAL MEDICINE

## 2021-02-16 PROCEDURE — 82728 ASSAY OF FERRITIN: CPT | Performed by: INTERNAL MEDICINE

## 2021-02-17 LAB
FERRITIN SERPL-MCNC: 43 NG/ML (ref 8–252)
IRON SATN MFR SERPL: 19 % (ref 15–46)
IRON SERPL-MCNC: 87 UG/DL (ref 35–180)
SARS-COV-2 AB PNL SERPL IA: NEGATIVE
SARS-COV-2 IGG SERPL IA-ACNC: NORMAL
TIBC SERPL-MCNC: 465 UG/DL (ref 240–430)

## 2021-02-17 RX ORDER — GABAPENTIN 100 MG/1
100 CAPSULE ORAL
Qty: 90 CAPSULE | Refills: 0 | Status: SHIPPED | OUTPATIENT
Start: 2021-02-17 | End: 2021-02-19

## 2021-02-17 NOTE — TELEPHONE ENCOUNTER
Can we schedule a follow up virtual visit Friday to discuss recent labs and pain control?    Shola Benoit MD, MD

## 2021-02-17 NOTE — TELEPHONE ENCOUNTER
Spoke to patient and scheduled a telephone visit on Friday, 2/19/21.    Jessica Sanders RN on 2/17/2021 at 5:02 PM

## 2021-02-17 NOTE — TELEPHONE ENCOUNTER
Routing refill request to provider for review/approval because:  Drug not on the [facility] refill protocol   Patient advised due for appointment.   Sendy HAYS RN,BSN

## 2021-02-18 LAB
ALBUMIN SERPL ELPH-MCNC: 3.9 G/DL (ref 3.7–5.1)
ALPHA1 GLOB SERPL ELPH-MCNC: 0.3 G/DL (ref 0.2–0.4)
ALPHA2 GLOB SERPL ELPH-MCNC: 0.8 G/DL (ref 0.5–0.9)
B-GLOBULIN SERPL ELPH-MCNC: 0.9 G/DL (ref 0.6–1)
GAMMA GLOB SERPL ELPH-MCNC: 0.8 G/DL (ref 0.7–1.6)
M PROTEIN SERPL ELPH-MCNC: 0 G/DL
PROT PATTERN SERPL ELPH-IMP: NORMAL

## 2021-02-19 ENCOUNTER — VIRTUAL VISIT (OUTPATIENT)
Dept: FAMILY MEDICINE | Facility: CLINIC | Age: 76
End: 2021-02-19
Payer: MEDICARE

## 2021-02-19 DIAGNOSIS — M54.50 CHRONIC RIGHT-SIDED LOW BACK PAIN WITHOUT SCIATICA: Primary | ICD-10-CM

## 2021-02-19 DIAGNOSIS — G89.29 CHRONIC RIGHT-SIDED LOW BACK PAIN WITHOUT SCIATICA: Primary | ICD-10-CM

## 2021-02-19 DIAGNOSIS — D64.9 ANEMIA, UNSPECIFIED TYPE: ICD-10-CM

## 2021-02-19 DIAGNOSIS — A80.9 POLIOMYELITIS: ICD-10-CM

## 2021-02-19 PROCEDURE — 99443 PR PHYSICIAN TELEPHONE EVALUATION 21-30 MIN: CPT | Mod: 95 | Performed by: INTERNAL MEDICINE

## 2021-02-19 RX ORDER — GABAPENTIN 100 MG/1
100 CAPSULE ORAL
Qty: 90 CAPSULE | Refills: 3 | Status: SHIPPED | OUTPATIENT
Start: 2021-02-19 | End: 2021-05-03

## 2021-02-19 ASSESSMENT — PATIENT HEALTH QUESTIONNAIRE - PHQ9: SUM OF ALL RESPONSES TO PHQ QUESTIONS 1-9: 1

## 2021-02-19 NOTE — PROGRESS NOTES
Chasity Hodgson is a 75 year old female who is being evaluated via a billable telephone visit.      What phone number would you like to be contacted at? [phone]  How would you like to obtain your AVS? Carlos Alberto Maciel     Chasity Hodgson is a 75 year old female who presents via phone visit today for the following health issues:    Results    She has had difficulty with reduced hemoglobin since her recent surgery.  She is trying to take a multivitamin with iron and eating iron rich food.  No noticing any blood in the stool, and not   Poliomyelitis    Chasity Hodgson has been taking gabapentin for many years in addition to acetaminophen to help with neuropathic pain in the right leg.  Has seen spine surgeon for this in the past and has had cortisone epidural in lumbar spine.  She has also followed in orthopedics for hip pain.  She does have some exercises she can do at home.        Review of Systems   Constitutional, HEENT, cardiovascular, pulmonary, GI, , musculoskeletal, neuro, skin, endocrine and psych systems are negative, except as otherwise noted.       Objective          Vitals:  No vitals were obtained today due to virtual visit.    healthy, alert and no distress  PSYCH: Alert and oriented times 3; coherent speech, normal   rate and volume, able to articulate logical thoughts, able   to abstract reason, no tangential thoughts, no hallucinations   or delusions  Her affect is normal  RESP: No cough, no audible wheezing, able to talk in full sentences  Remainder of exam unable to be completed due to telephone visits    (M54.5,  G89.29) Chronic right-sided low back pain without sciatica  (primary encounter diagnosis)  Comment: We discussed that we could continue gabapentin and the prescription sent to her pharmacy with plan to follow-up with her spine clinic if she needs to in the future.  She is also been continue with physical therapy following her recent shoulder surgery  Plan:     (A80.9)  Poliomyelitis  Comment: As above.  She will continue with her ongoing rehab for her shoulder.  He seems to be doing well with regards to her history of polio  Plan: gabapentin (NEURONTIN) 100 MG capsule            (D64.9) Anemia, unspecified type  Comment: I did recommend rechecking her hemoglobin again we will plan to do this again in the next 2 to 3 months as a standing order was placed.  Plan: CBC with platelets                   Phone call duration:  25 minutes

## 2021-02-21 NOTE — RESULT ENCOUNTER NOTE
Gerard Gaspar,    I have had the opportunity to review your recent results and an interpretation is as follows:  Your hemoglobin remains low as we discussed, but stable and likely related to recent surgery.  We will plan to recheck again in the next few months    Your COVID antibody returned negative indicating that you have not formed an immune response to COVID which likely means that you have not yet been exposed      Sincerely,  Shola Benoit MD

## 2021-02-22 ENCOUNTER — MYC MEDICAL ADVICE (OUTPATIENT)
Dept: FAMILY MEDICINE | Facility: CLINIC | Age: 76
End: 2021-02-22

## 2021-02-22 DIAGNOSIS — Z12.31 ENCOUNTER FOR SCREENING MAMMOGRAM FOR BREAST CANCER: Primary | ICD-10-CM

## 2021-03-02 ENCOUNTER — HOSPITAL ENCOUNTER (OUTPATIENT)
Dept: MAMMOGRAPHY | Facility: CLINIC | Age: 76
Discharge: HOME | End: 2021-03-02
Attending: INTERNAL MEDICINE | Admitting: INTERNAL MEDICINE
Payer: MEDICARE

## 2021-03-02 DIAGNOSIS — Z12.31 ENCOUNTER FOR SCREENING MAMMOGRAM FOR BREAST CANCER: ICD-10-CM

## 2021-03-02 PROCEDURE — 77063 BREAST TOMOSYNTHESIS BI: CPT

## 2021-04-26 DIAGNOSIS — D64.9 ANEMIA, UNSPECIFIED TYPE: ICD-10-CM

## 2021-04-26 LAB
ERYTHROCYTE [DISTWIDTH] IN BLOOD BY AUTOMATED COUNT: 12.5 % (ref 10–15)
HCT VFR BLD AUTO: 35 % (ref 35–47)
HGB BLD-MCNC: 11.7 G/DL (ref 11.7–15.7)
MCH RBC QN AUTO: 31 PG (ref 26.5–33)
MCHC RBC AUTO-ENTMCNC: 33.4 G/DL (ref 31.5–36.5)
MCV RBC AUTO: 93 FL (ref 78–100)
PLATELET # BLD AUTO: 193 10E9/L (ref 150–450)
RBC # BLD AUTO: 3.77 10E12/L (ref 3.8–5.2)
WBC # BLD AUTO: 7.2 10E9/L (ref 4–11)

## 2021-04-26 PROCEDURE — 36415 COLL VENOUS BLD VENIPUNCTURE: CPT | Performed by: INTERNAL MEDICINE

## 2021-04-26 PROCEDURE — 85027 COMPLETE CBC AUTOMATED: CPT | Performed by: INTERNAL MEDICINE

## 2021-05-03 ENCOUNTER — OFFICE VISIT (OUTPATIENT)
Dept: FAMILY MEDICINE | Facility: CLINIC | Age: 76
End: 2021-05-03
Payer: MEDICARE

## 2021-05-03 VITALS
DIASTOLIC BLOOD PRESSURE: 62 MMHG | HEART RATE: 59 BPM | OXYGEN SATURATION: 97 % | SYSTOLIC BLOOD PRESSURE: 154 MMHG | HEIGHT: 60 IN | WEIGHT: 192 LBS | TEMPERATURE: 97.1 F | BODY MASS INDEX: 37.69 KG/M2

## 2021-05-03 DIAGNOSIS — A80.9 POLIOMYELITIS: ICD-10-CM

## 2021-05-03 DIAGNOSIS — E11.69 TYPE 2 DIABETES MELLITUS WITH OTHER SPECIFIED COMPLICATION, WITH LONG-TERM CURRENT USE OF INSULIN (H): ICD-10-CM

## 2021-05-03 DIAGNOSIS — E78.2 MIXED HYPERLIPIDEMIA: Primary | ICD-10-CM

## 2021-05-03 DIAGNOSIS — Z79.4 TYPE 2 DIABETES MELLITUS WITH OTHER SPECIFIED COMPLICATION, WITH LONG-TERM CURRENT USE OF INSULIN (H): ICD-10-CM

## 2021-05-03 DIAGNOSIS — M79.7 FIBROMYALGIA: ICD-10-CM

## 2021-05-03 PROCEDURE — 99214 OFFICE O/P EST MOD 30 MIN: CPT | Performed by: INTERNAL MEDICINE

## 2021-05-03 RX ORDER — AZELASTINE HCL 205.5 UG/1
1 SPRAY NASAL DAILY PRN
COMMUNITY
End: 2024-07-24

## 2021-05-03 RX ORDER — GABAPENTIN 100 MG/1
100 CAPSULE ORAL
Qty: 90 CAPSULE | Refills: 3 | Status: SHIPPED | OUTPATIENT
Start: 2021-05-03 | End: 2022-05-17

## 2021-05-03 RX ORDER — INSULIN DEGLUDEC 100 U/ML
5 INJECTION, SOLUTION SUBCUTANEOUS DAILY
COMMUNITY
Start: 2021-04-16 | End: 2021-09-29

## 2021-05-03 ASSESSMENT — MIFFLIN-ST. JEOR: SCORE: 1292.41

## 2021-05-03 NOTE — PATIENT INSTRUCTIONS
(E78.2) Mixed hyperlipidemia  (primary encounter diagnosis)  Comment: We will continue with current medications for management of cholesterol  Plan:     (E11.69,  Z79.4) Type 2 diabetes mellitus with other specified complication, with long-term current use of insulin (H)  Comment: continue to follow up in endocrinology.    Plan:     (A80.9) Poliomyelitis  Comment: OK to refill gabapentinu  Plan: gabapentin (NEURONTIN) 100 MG capsule

## 2021-05-03 NOTE — PROGRESS NOTES
Raheem Cochran is a 75 year old who presents for the following health issues     HPI     Follow up chronic health cares, elevated blood glucose readings after medication changes from endocrinologist, refill medications.        Mixed hyperlipidemia   No side effects from either Zetia and Fenofibrate  Type 2 diabetes mellitus with other specified complication, with long-term current use of insulin (H)   Chasity Hodgson has been following in endocrinology and had been doing very well, but had side effects from Dapaglifozin and switched back Januvia.  Her blood glucose are in the 140-180s at times with current regimen of insulin Tresiba, but had much better control with insulin Troujeo and would like to go back on this if her insurance would cover this medication.  Poliomyelitis  Fibromyalgia   Still experiencing right leg pain that has been bothering her.  She is going to physical therapy once per week to help with low back pain and radicular pain.  Continues on gabapentin 100 mg every night. She did request a referral to pain management clinic today.    Review of Systems   Constitutional, HEENT, cardiovascular, pulmonary - occasional cough, GI - recent gastroenteritis, , musculoskeletal - as above , neuro, skin, endocrine and psych systems are negative, except as otherwise noted.      Objective    BP (!) 154/62 (BP Location: Right arm, Cuff Size: Adult Large)   Pulse 59   Temp 97.1  F (36.2  C) (Temporal)   Ht 1.524 m (5')   Wt 87.1 kg (192 lb)   SpO2 97%   Breastfeeding No   BMI 37.50 kg/m    Body mass index is 37.5 kg/m .  Physical Exam   GENERAL: healthy, alert and no distress  EYES: Eyes grossly normal to inspection,    NECK: no adenopathy, no asymmetry,    RESP: lungs clear to auscultation - no rales, rhonchi or wheezes  CV: regular rate and rhythm, normal S1 S2, no S3 or S4, no murmur,  Trace edema  ABDOMEN: obese, soft, nontender, no hepatosplenomegaly,   MS: no gross musculoskeletal defects noted,  no edema  FOOT: Normal sensation left foot, right foot in brace  SKIN: no suspicious lesions or rashes  NEURO:  mentation intact and speech normal  PSYCH: mentation appears normal, affect normal/bright    Patient Instructions   (E78.2) Mixed hyperlipidemia  (primary encounter diagnosis)  Comment: We will continue with current medications for management of cholesterol  Plan:     (E11.69,  Z79.4) Type 2 diabetes mellitus with other specified complication, with long-term current use of insulin (H)  Comment: continue to follow up in endocrinology.    Plan:     (A80.9) Poliomyelitis  Comment: OK to refill gabapentinu  Plan: gabapentin (NEURONTIN) 100 MG capsule          Fibromyalgia  Comment; Referral to pain management placed

## 2021-05-27 VITALS
SYSTOLIC BLOOD PRESSURE: 134 MMHG | HEART RATE: 57 BPM | OXYGEN SATURATION: 97 % | RESPIRATION RATE: 16 BRPM | DIASTOLIC BLOOD PRESSURE: 83 MMHG | TEMPERATURE: 97.3 F

## 2021-05-28 ENCOUNTER — TRANSFERRED RECORDS (OUTPATIENT)
Dept: HEALTH INFORMATION MANAGEMENT | Facility: CLINIC | Age: 76
End: 2021-05-28

## 2021-06-05 VITALS
DIASTOLIC BLOOD PRESSURE: 48 MMHG | OXYGEN SATURATION: 96 % | TEMPERATURE: 97.8 F | RESPIRATION RATE: 18 BRPM | WEIGHT: 184.6 LBS | HEART RATE: 61 BPM | BODY MASS INDEX: 36.05 KG/M2 | SYSTOLIC BLOOD PRESSURE: 137 MMHG

## 2021-06-05 VITALS
DIASTOLIC BLOOD PRESSURE: 55 MMHG | RESPIRATION RATE: 16 BRPM | HEIGHT: 59 IN | TEMPERATURE: 96.4 F | BODY MASS INDEX: 36.55 KG/M2 | WEIGHT: 181.3 LBS | OXYGEN SATURATION: 99 % | HEART RATE: 63 BPM | SYSTOLIC BLOOD PRESSURE: 121 MMHG

## 2021-06-05 VITALS
TEMPERATURE: 97.8 F | BODY MASS INDEX: 35.44 KG/M2 | WEIGHT: 175.8 LBS | HEIGHT: 59 IN | OXYGEN SATURATION: 99 % | HEART RATE: 56 BPM | DIASTOLIC BLOOD PRESSURE: 40 MMHG | RESPIRATION RATE: 16 BRPM | SYSTOLIC BLOOD PRESSURE: 116 MMHG

## 2021-06-05 VITALS
WEIGHT: 180 LBS | OXYGEN SATURATION: 98 % | BODY MASS INDEX: 36.98 KG/M2 | DIASTOLIC BLOOD PRESSURE: 47 MMHG | TEMPERATURE: 97 F | RESPIRATION RATE: 16 BRPM | SYSTOLIC BLOOD PRESSURE: 119 MMHG | HEART RATE: 60 BPM

## 2021-06-05 VITALS
SYSTOLIC BLOOD PRESSURE: 118 MMHG | BODY MASS INDEX: 36.89 KG/M2 | HEART RATE: 62 BPM | HEIGHT: 59 IN | WEIGHT: 183 LBS | OXYGEN SATURATION: 98 % | RESPIRATION RATE: 16 BRPM | DIASTOLIC BLOOD PRESSURE: 62 MMHG | TEMPERATURE: 97.3 F

## 2021-06-05 VITALS
WEIGHT: 181.9 LBS | DIASTOLIC BLOOD PRESSURE: 62 MMHG | BODY MASS INDEX: 37.37 KG/M2 | HEART RATE: 65 BPM | SYSTOLIC BLOOD PRESSURE: 185 MMHG | OXYGEN SATURATION: 97 % | RESPIRATION RATE: 16 BRPM | TEMPERATURE: 97.3 F

## 2021-06-05 VITALS
RESPIRATION RATE: 16 BRPM | BODY MASS INDEX: 37.27 KG/M2 | DIASTOLIC BLOOD PRESSURE: 58 MMHG | HEART RATE: 58 BPM | WEIGHT: 181.4 LBS | TEMPERATURE: 97.8 F | OXYGEN SATURATION: 100 % | SYSTOLIC BLOOD PRESSURE: 136 MMHG

## 2021-06-13 NOTE — PROGRESS NOTES
[facility]    Facility:   [facility] [[id number]]   Code Status: FULL CODE      CHIEF COMPLAINT/REASON FOR VISIT:  Chief Complaint   Patient presents with     Review Of Multiple Medical Conditions     malnutrition F/U left shoulder hemiarthroplasty, pain control        HISTORY:      HPI: Chasity is a 75 y.o. female undergoing physical and occupational therapy at [facility] [place]. She is  with a history of DM2, CAD, hypothyroid, DVT/PE, HTN who was admitted 11/12/2020 for operative management of left shoulder glenohumeral degenerative joint disease.  On 11/12/20 the patient underwent left shoulder hemiarthroplasty, biceps tenodesis.    Today she is seen in her room to review multiple medical issues and to establish care.  She denied chest pain or shortness of breath.  Denied constipation or diarrhea she is with a colostomy.  She reports she has had this colostomy for approximately 8 years.  She rates her left shoulder pain 9 out of 10 but took a pain pill and it came down to 3-4 out of 10.  She is being followed by the dietitian and  has a new diagnosis of moderate protein calorie malnutrition.  She denies cough or congestion.  She requested to use her  home Salonpas patches for her right knee OA as needed.    Past Medical History:   Diagnosis Date     Abdominal adhesions      Acne rosacea      Allergic rhinitis      Alopecia      Anemia      CAD (coronary artery disease)      CKD (chronic kidney disease) stage 2, GFR 60-89 ml/min      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)      Depression      DM2 (diabetes mellitus, type 2) (H)      DVT (deep venous thrombosis) (H)      GERD (gastroesophageal reflux disease)      HTN (hypertension)      Hypertriglyceridemia      Hypokalemia      Hypothyroidism      ANNETTE on CPAP      Osteoarthritis of glenohumeral joint      Papillary carcinoma of thyroid (H)      Poliomyelitis      Pulmonary embolus (H)      Pulmonary nodule      Septic joint of  right knee joint (H)      Venous insufficiency              No family history on file.  Social History     Socioeconomic History     Marital status:      Spouse name: Not on file     Number of children: Not on file     Years of education: Not on file     Highest education level: Not on file   Occupational History     Not on file   Social Needs     Financial resource strain: Not on file     Food insecurity     Worry: Not on file     Inability: Not on file     Transportation needs     Medical: Not on file     Non-medical: Not on file   Tobacco Use     Smoking status: Never Smoker     Smokeless tobacco: Never Used   Substance and Sexual Activity     Alcohol use: Not Currently     Drug use: Not on file     Sexual activity: Not on file   Lifestyle     Physical activity     Days per week: Not on file     Minutes per session: Not on file     Stress: Not on file   Relationships     Social connections     Talks on phone: Not on file     Gets together: Not on file     Attends Taoist service: Not on file     Active member of club or organization: Not on file     Attends meetings of clubs or organizations: Not on file     Relationship status: Not on file     Intimate partner violence     Fear of current or ex partner: Not on file     Emotionally abused: Not on file     Physically abused: Not on file     Forced sexual activity: Not on file   Other Topics Concern     Not on file   Social History Narrative     Not on file         Review of Systems   Constitutional: Positive for activity change. Negative for appetite change, fatigue and fever.        Nonweightbearing left upper extremity   HENT: Negative for congestion.    Eyes: Positive for visual disturbance.        Wears glasses   Respiratory: Negative for cough, shortness of breath and wheezing.         Patient with allergic rhinitis on multiple medications   Cardiovascular: Negative for chest pain and leg swelling.   Gastrointestinal: Negative for abdominal  distention, abdominal pain, constipation, diarrhea and nausea.        Pt with a colostomy - she reported she had it 8 years    Genitourinary: Negative for dysuria.   Musculoskeletal: Positive for arthralgias. Negative for back pain.        Reports right knee arthritis and uses salonapa patches PRN    Skin: Positive for wound. Negative for color change.   Neurological: Negative for dizziness.   Psychiatric/Behavioral: Negative for agitation, behavioral problems and confusion.       Vitals:    11/19/20 0744   BP: 137/48   Pulse: 61   Resp: 18   Temp: 97.8  F (36.6  C)   SpO2: 96%   Weight: 184 lb 9.6 oz (83.7 kg)       Physical Exam  Constitutional:       Appearance: She is well-developed.      Comments: Pleasant woman in on acute distress    HENT:      Head: Normocephalic.   Eyes:      Conjunctiva/sclera: Conjunctivae normal.   Neck:      Musculoskeletal: Normal range of motion.   Cardiovascular:      Rate and Rhythm: Normal rate and regular rhythm.      Heart sounds: Normal heart sounds. No murmur.   Pulmonary:      Effort: No respiratory distress.      Breath sounds: Normal breath sounds. No wheezing or rales.   Abdominal:      General: Bowel sounds are normal. There is no distension.      Palpations: Abdomen is soft.      Tenderness: There is no abdominal tenderness.   Musculoskeletal: Normal range of motion.      Comments: Steri strips left shoulder    Skin:     General: Skin is warm.   Neurological:      Mental Status: She is alert and oriented to person, place, and time.   Psychiatric:         Behavior: Behavior normal.           LABS:   Recent Results (from the past 240 hour(s))   Basic Metabolic Panel   Result Value Ref Range    Sodium 134 (L) 136 - 145 mmol/L    Potassium 5.6 (H) 3.5 - 5.0 mmol/L    Chloride 101 98 - 107 mmol/L    CO2 26 22 - 31 mmol/L    Anion Gap, Calculation 7 5 - 18 mmol/L    Glucose 125 70 - 125 mg/dL    Calcium 8.6 8.5 - 10.5 mg/dL    BUN 35 (H) 8 - 28 mg/dL    Creatinine 1.15 (H) 0.60  - 1.10 mg/dL    GFR MDRD Af Amer 56 (L) >60 mL/min/1.73m2    GFR MDRD Non Af Amer 46 (L) >60 mL/min/1.73m2   Hepatic Profile   Result Value Ref Range    Bilirubin, Total 0.3 0.0 - 1.0 mg/dL    Bilirubin, Direct 0.1 <=0.5 mg/dL    Protein, Total 5.3 (L) 6.0 - 8.0 g/dL    Albumin 2.7 (L) 3.5 - 5.0 g/dL    Alkaline Phosphatase 45 45 - 120 U/L    AST 18 0 - 40 U/L    ALT 10 0 - 45 U/L   Lipase   Result Value Ref Range    Lipase 15 0 - 52 U/L   Hemoglobin   Result Value Ref Range    Hemoglobin 10.2 (L) 12.0 - 16.0 g/dL     Current Outpatient Medications   Medication Sig     acetaminophen (TYLENOL) 500 MG tablet Take 1,000 mg by mouth every 6 (six) hours as needed for pain.     amLODIPine (NORVASC) 2.5 MG tablet Take 2.5 mg by mouth 2 (two) times a day.     aspirin 81 MG EC tablet Take 81 mg by mouth daily.     atenoloL (TENORMIN) 50 MG tablet Take 50 mg by mouth 2 (two) times a day.     azelastine (ASTEPRO) 0.15 % (205.5 mcg) Spry nasal spray Apply 1 spray into each nostril at bedtime.     biotin 1 mg tablet Take 1,000 mcg by mouth daily.     cholecalciferol, vitamin D3, 5,000 unit Tab Take 5,000 Units by mouth daily.     clotrimazole (LOTRIMIN) 1 % cream Apply 1 application topically daily as needed.     diphenhydrAMINE-acetaminophen (TYLENOL PM)  mg Tab Take 1 tablet by mouth at bedtime as needed.     famotidine (PEPCID) 40 MG tablet Take 40 mg by mouth daily as needed for heartburn.     fenofibrate (TRICOR) 145 MG tablet Take 145 mg by mouth daily.     ferrous sulfate 325 (65 FE) MG tablet Take 1 tablet by mouth daily with breakfast.     fexofenadine (ALLEGRA) 180 MG tablet Take 180 mg by mouth daily.     fluticasone propionate (FLONASE) 50 mcg/actuation nasal spray Apply 1 spray into each nostril daily.     furosemide (LASIX) 20 MG tablet Take 20 mg by mouth daily as needed.     gabapentin (NEURONTIN) 100 MG capsule Take 100 mg by mouth every evening.     insulin glargine U-300 conc 300 unit/mL (3 mL) InPn  Inject 5 Units under the skin daily before breakfast.     levothyroxine (SYNTHROID, LEVOTHROID) 112 MCG tablet Take 112 mcg by mouth daily. Except for Fridays. And 56 mcg on Friday.     lisinopriL (PRINIVIL,ZESTRIL) 40 MG tablet Take 40 mg by mouth daily.     mometasone (NASONEX) 50 mcg/actuation nasal spray 1 spray into each nostril 2 (two) times a day.     multivitamin (MULTIPLE VITAMINS ORAL) Take 1 tablet by mouth daily.     nitroglycerin (NITROSTAT) 0.4 MG SL tablet Place 0.4 mg under the tongue every 5 (five) minutes as needed for chest pain.     nystatin (MYCOSTATIN) cream Apply 1 application topically 2 (two) times a day as needed for dry skin.     nystatin (MYCOSTATIN) powder Apply 1 application topically daily. every Thu, Sun for use with colostomy after bath     nystatin-triamcinolone (MYCOLOG II) cream Apply 1 application topically daily as needed.     ondansetron (ZOFRAN) 4 MG tablet Take 4 mg by mouth every 8 (eight) hours as needed for nausea.     oxyCODONE (ROXICODONE) 5 MG immediate release tablet Take 1 tablet (5 mg total) by mouth every 4 (four) hours as needed for pain.     pantoprazole (PROTONIX) 40 MG tablet Take 40 mg by mouth 2 (two) times a day.     polyethylene glycol (MIRALAX) 17 gram packet Take 17 g by mouth daily as needed.     polyethylene glycol 400 (BLINK TEARS) 0.25 % Drop Apply 1 drop to eye 4 (four) times a day as needed.     senna-docusate (SENNOSIDES-DOCUSATE SODIUM) 8.6-50 mg tablet Take 1 tablet by mouth 2 (two) times a day as needed for constipation.     SITagliptin (JANUVIA) 50 MG tablet Take 50 mg by mouth daily.     sucralfate (CARAFATE) 100 mg/mL suspension Take 1 g by mouth 2 (two) times a day as needed.     ASSESSMENT:      ICD-10-CM    1. Status post total shoulder arthroplasty, left  Z96.612    2. Pain management  R52    3. Moderate protein-calorie malnutrition (H)  E44.0        PLAN:      S/P left shoulder arthroplasty- non weight bearing, Pain control, Follow up  with Orthopedics, Monitor incision for S/S infection     Pain management - On PRN ES Tylenol and oxycodone, continue gabapentin     Moderate protein calorie malnutrition- Followed by the dietician, weights per facility protocol.     Hypertension-on atenolol, , lisinopril , Norvasc.     Anemia on ferrous sulfate, Last HGB 10.2 on 11/20/20    Type 2 diabetes, Continue FS, on Januvia, Lantus Aic 9/19 was 6.8- Lab pending     Allergic Rhinitis on Astepro nasal spray, Allegra,nasonex and Flonase     Hypothyroidism- Continue Synthroid no recent TSH, lab pending     GERD on Protonix.Pepcid, and Carafate      Electronically signed by: Luly Ray CNP

## 2021-06-13 NOTE — PROGRESS NOTES
[facility]      Facility:    [facility] [[id number]]  Code Status: FULL CODE      Chief Complaint/Reason for Visit:   Chief Complaint   Patient presents with     Review Of Multiple Medical Conditions     malnutrition F/U left shoulder hemiarthroplasty, pain control        HPI:   Chasity is a 75 y.o. female who is a transfer from [facility] with an admission on 11/12/20 and discharge on 11/17/20. She has a PMH of DM2, CAD, hypothyroidism, DVT/PE, Htn, who underwent operative management of a left shouder glenohumeral degenerative joint disease with a hemiarthroplasty and biceps tenodesis. Postoperatively no issues other than some nausea which was remedied. She was given WBAT and tylenol/oxycodone for pain control. She was then discharged to the TCU.    Today will assess vitals, pain control and therapy.     Past Medical History:  Past Medical History:   Diagnosis Date     Abdominal adhesions      Acne rosacea      Allergic rhinitis      Alopecia      Anemia      CAD (coronary artery disease)      CKD (chronic kidney disease) stage 2, GFR 60-89 ml/min      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)      Depression      DM2 (diabetes mellitus, type 2) (H)      DVT (deep venous thrombosis) (H)      GERD (gastroesophageal reflux disease)      HTN (hypertension)      Hypertriglyceridemia      Hypokalemia      Hypothyroidism      ANNETTE on CPAP      Osteoarthritis of glenohumeral joint      Papillary carcinoma of thyroid (H)      Poliomyelitis      Pulmonary embolus (H)      Pulmonary nodule      Septic joint of right knee joint (H)      Venous insufficiency            Surgical History:  Past Surgical History:   Procedure Laterality Date     APPENDECTOMY       CARPAL TUNNEL RELEASE       CHOLECYSTECTOMY       COLOSTOMY       FOOT ARTHRODESIS Right     Right foot triple arthrodesis and removal of hardware     ROTATOR CUFF REPAIR       SHOULDER ARTHROSCOPY  06/25/2015    REVISION  SUBACROMIAL DECOMPRESSION, EXCISION OF GANGLION CYST, DEBRIDEMENT AND EXCISION OF THE GLENOHUMERAL JOINT GANGLION CYST, CORACOID DECOMPRESSION, POSSIBLE SUBSCAPULARIS REPAIR AND OPEN SUBSCAPULARIS BICEP     SHOULDER SURGERY  11/12/2020    LEFT SHOULDER HEMIARHTROPLASTY, BICEP TENODESIS     TENDON RELEASE      foot     THYROIDECTOMY       TOE AMPUTATION       TOTAL ABDOMINAL HYSTERECTOMY         Family History:   No family history on file.    Social History:    Social History     Socioeconomic History     Marital status:      Spouse name: Not on file     Number of children: Not on file     Years of education: Not on file     Highest education level: Not on file   Occupational History     Not on file   Social Needs     Financial resource strain: Not on file     Food insecurity     Worry: Not on file     Inability: Not on file     Transportation needs     Medical: Not on file     Non-medical: Not on file   Tobacco Use     Smoking status: Never Smoker     Smokeless tobacco: Never Used   Substance and Sexual Activity     Alcohol use: Not Currently     Drug use: Not on file     Sexual activity: Not on file   Lifestyle     Physical activity     Days per week: Not on file     Minutes per session: Not on file     Stress: Not on file   Relationships     Social connections     Talks on phone: Not on file     Gets together: Not on file     Attends [employer] service: Not on file     Active member of club or organization: Not on file     Attends meetings of clubs or organizations: Not on file     Relationship status: Not on file     Intimate partner violence     Fear of current or ex partner: Not on file     Emotionally abused: Not on file     Physically abused: Not on file     Forced sexual activity: Not on file   Other Topics Concern     Not on file   Social History Narrative     Not on file          Review of Systems   Constitutional: Positive for activity change and fatigue. Negative for appetite change, chills and  "diaphoresis.        Still has pain in L UE   HENT: Negative for congestion and hearing loss.    Eyes: Negative.    Cardiovascular: Positive for leg swelling.   Gastrointestinal: Negative for abdominal distention, abdominal pain, blood in stool, constipation, diarrhea and nausea.   Endocrine: Negative.    Genitourinary: Negative for difficulty urinating.   Musculoskeletal:        L UE   Skin:        L shoulder   Allergic/Immunologic: Negative.    Neurological: Negative for dizziness, tremors, speech difficulty and light-headedness.   Hematological: Negative.    Psychiatric/Behavioral: Positive for sleep disturbance. Negative for agitation, confusion and hallucinations. The patient is not nervous/anxious.        Vitals:    12/08/20 0731   BP: 116/40   Pulse: (!) 56   Resp: 16   Temp: 97.8  F (36.6  C)   SpO2: 99%   Weight: 175 lb 12.8 oz (79.7 kg)   Height: 4' 10.5\" (1.486 m)       Physical Exam  Vitals signs reviewed.   Constitutional:       Appearance: She is obese.   HENT:      Head: Normocephalic.      Right Ear: External ear normal.      Left Ear: External ear normal.      Nose: No congestion.      Mouth/Throat:      Pharynx: No oropharyngeal exudate.   Eyes:      General:         Right eye: No discharge.         Left eye: No discharge.   Neck:      Musculoskeletal: Normal range of motion.   Cardiovascular:      Rate and Rhythm: Normal rate.      Pulses: Normal pulses.      Heart sounds: No murmur.   Pulmonary:      Effort: Pulmonary effort is normal. No respiratory distress.      Comments: RA, CTA  Abdominal:      General: There is no distension.      Palpations: Abdomen is soft.      Tenderness: There is no abdominal tenderness. There is no guarding.      Comments: Denies constipation or diarrhea, colostomy patent   Genitourinary:     Comments: No issues  Musculoskeletal:      Right lower leg: Edema present.      Left lower leg: Edema present.      Comments: Non pitting dependent BLE, has R AFO, pain in L UE " persists. WBAT, wearing sling. Recent fall   Skin:     General: Skin is warm.      Comments: L shoulder incision intact   Neurological:      General: No focal deficit present.      Mental Status: She is oriented to person, place, and time.   Psychiatric:         Mood and Affect: Mood normal.         Behavior: Behavior normal.         Medication List:  Current Outpatient Medications   Medication Sig     acetaminophen (TYLENOL) 500 MG tablet Take 1,000 mg by mouth 4 (four) times a day.      amLODIPine (NORVASC) 2.5 MG tablet Take 2.5 mg by mouth 2 (two) times a day.     aspirin 81 MG EC tablet Take 81 mg by mouth daily.     atenoloL (TENORMIN) 50 MG tablet Take 25 mg by mouth 2 (two) times a day.      azelastine (ASTEPRO) 0.15 % (205.5 mcg) Spry nasal spray Apply 1 spray into each nostril at bedtime.     biotin 1 mg tablet Take 1,000 mcg by mouth daily.     cholecalciferol, vitamin D3, 5,000 unit Tab Take 5,000 Units by mouth daily.     clotrimazole (LOTRIMIN) 1 % cream Apply 1 application topically daily as needed.     famotidine (PEPCID) 40 MG tablet Take 40 mg by mouth daily as needed for heartburn.     fenofibrate (TRICOR) 145 MG tablet Take 145 mg by mouth daily.     fexofenadine (ALLEGRA) 180 MG tablet Take 180 mg by mouth daily.     fluticasone propionate (FLONASE) 50 mcg/actuation nasal spray Apply 1 spray into each nostril daily.     furosemide (LASIX) 20 MG tablet Take 20 mg by mouth daily as needed.     gabapentin (NEURONTIN) 100 MG capsule Take 100 mg by mouth every evening.     hydrOXYzine pamoate (VISTARIL) 25 MG capsule Take 25 mg by mouth 3 (three) times a day. Give with tylenol     insulin glargine U-300 conc 300 unit/mL (3 mL) InPn Inject 5 Units under the skin daily before breakfast.     levothyroxine (SYNTHROID, LEVOTHROID) 112 MCG tablet Take 112 mcg by mouth daily. Except for Fridays. And 56 mcg on Friday.     lidocaine 4 % patch Place 1 patch on the skin daily. Remove and discard patch with 12  hours. Apply to R knee     lisinopriL (PRINIVIL,ZESTRIL) 40 MG tablet Take 40 mg by mouth daily.     melatonin 3 mg Tab tablet Take 6 mg by mouth at bedtime.     mometasone (NASONEX) 50 mcg/actuation nasal spray 1 spray into each nostril 2 (two) times a day.     multivitamin (MULTIPLE VITAMINS ORAL) Take 1 tablet by mouth daily.     nitroglycerin (NITROSTAT) 0.4 MG SL tablet Place 0.4 mg under the tongue every 5 (five) minutes as needed for chest pain.     nystatin (MYCOSTATIN) cream Apply 1 application topically 2 (two) times a day as needed for dry skin.     nystatin (MYCOSTATIN) powder Apply 1 application topically daily. every Thu, Sun for use with colostomy after bath     nystatin-triamcinolone (MYCOLOG II) cream Apply 1 application topically daily as needed.     ondansetron (ZOFRAN) 4 MG tablet Take 4 mg by mouth every 8 (eight) hours as needed for nausea.     oxyCODONE (ROXICODONE) 5 MG immediate release tablet Take 1 tablet (5 mg total) by mouth every 4 (four) hours as needed for pain.     pantoprazole (PROTONIX) 40 MG tablet Take 40 mg by mouth 2 (two) times a day.     polyethylene glycol (MIRALAX) 17 gram packet Take 17 g by mouth daily as needed.     polyethylene glycol 400 (BLINK TEARS) 0.25 % Drop Apply 1 drop to eye 4 (four) times a day as needed.     senna-docusate (SENNOSIDES-DOCUSATE SODIUM) 8.6-50 mg tablet Take 1 tablet by mouth 2 (two) times a day as needed for constipation.     SITagliptin (JANUVIA) 50 MG tablet Take 50 mg by mouth daily.     sucralfate (CARAFATE) 100 mg/mL suspension Take 1 g by mouth 2 (two) times a day as needed.       Labs:  Results for orders placed or performed in visit on 11/30/20   Basic Metabolic Panel   Result Value Ref Range    Sodium 136 136 - 145 mmol/L    Potassium 4.7 3.5 - 5.0 mmol/L    Chloride 105 98 - 107 mmol/L    CO2 26 22 - 31 mmol/L    Anion Gap, Calculation 5 5 - 18 mmol/L    Glucose 101 70 - 125 mg/dL    Calcium 8.7 8.5 - 10.5 mg/dL    BUN 34 (H) 8 - 28  mg/dL    Creatinine 1.03 0.60 - 1.10 mg/dL    GFR MDRD Af Amer >60 >60 mL/min/1.73m2    GFR MDRD Non Af Amer 52 (L) >60 mL/min/1.73m2     Lab Results   Component Value Date    WBC 6.6 12/02/2020    HGB 9.1 (L) 12/02/2020    HCT 27.5 (L) 12/02/2020    MCV 94 12/02/2020     12/02/2020     Vitamin D, Total (25-Hydroxy)   Date Value Ref Range Status   11/27/2020 58.0 30.0 - 80.0 ng/mL Final       Lab Results   Component Value Date    TSH 14.89 (H) 11/27/2020       Lab Results   Component Value Date    HGBA1C 6.2 (H) 11/27/2020       Vitamin B-12   Date Value Ref Range Status   11/27/2020 860 (H) 213 - 816 pg/mL Final         Assessment/Plan:    Left shouder glenohumeral degenerative joint disease with a hemiarthroplasty and biceps tenodesis: WBAT, f/u with ortho as ordered. Per recent fall and XR requirements, ortho will f/u with her on 12/16    Pain control: continue tylenol 1000mg four times a day and oxycodone 5mg q4h PRN (usually 3x daily). Continue vistaril 25mg three times a day, encourage icing. Pain improved    Acute hyperkalemia: last K 4.7 on 11/30/20, given kayexalate prior. Recheck BMP on 12/10    R knee pain: continue lidocaine patch    CAD: continue ASA 81mg daily, statin and fenofibrate     HTN: continue lisinopril 40mg daily, amlodipine 2.5mg BID and per persistent HR <60 will decrease atenolol to 25mg two times a day    Allergies: continue fexofenadine and flonase daily. Advised to change allegra to PRN though reluctulant currently     Hypothyroidism: continue synthroid 112mcg daily and 56mcg on fridays, last TSH 14.89, recheck TSH/FT4 on 12/10    DM2: continue U-300 5U daily, Januvia 50mg daily. Most BS <200    Neuropathy: continue gabapentin 100mg at HS    GERD: continue protonix 40mg two times a day, sucralfate 10mg two times a day PRN and pepcid 40mg daily PRN. Also has zofran PRN    Abdominal pain: Abx negative, resolved. Possibly d/t GERD    Anemia: last Hgb 9.1 on 12/2/20, patient  requested FeSO4 to be discontinued    Vit D def: continue D3 5000U daily, recheck 58.0    Insomnia: continue melatonin 6mg at HS, discontinued tylenol PM, effective    Morbid obesity: last BMI 36.1    Constipation: continue senna S 1 tab two times a day and miralax daily PRN    Labs: TSH 14.89, vit D B12 860, A1c 6.2    Disposition: lives with son who has cerebral palsy. SLUMS 23/30      Electronically signed by: Leonides Acuna NP

## 2021-06-13 NOTE — TELEPHONE ENCOUNTER
[facility] Nurse Triage Telephone Note      Provider: KAYE Wang  Facility: [facility]    Facility Type: TCU    Caller: Day  Call Back Number:  [phone]    Allergies: Ketorolac, Celecoxib, Codeine, Sulfa (sulfonamide antibiotics), Nsaids (non-steroidal anti-inflammatory drug), Rofecoxib, Rosuvastatin, and Conjugated estrogens    Reason for call: Nurse calling to report Heme 1 results.  Nurse also states that patient continues to periodically c/o abdominal pain in her upper quadrants.  Patient has a colostomy which is putting out soft stool.  BS are hyperactive.  Notable meds:  Ferrous sulfate 325mg daily, Protonix 40mg two times a day, EC ASA 81mg daily.      Verbal Order/Direction given by Provider: No new orders.      Provider giving order: KAYE Wang    Verbal order given to: Day Mcintosh RN

## 2021-06-13 NOTE — PROGRESS NOTES
[facility]    [facility] [[id number]]   Code Status: FULL CODE      CHIEF COMPLAINT/REASON FOR VISIT:  Chief Complaint   Patient presents with     Problem Visit     F/U left shoulder arthroplasty, anemia        HISTORY:      HPI: Chasity is a 75 y.o. female undergoing physical and occupational therapy at [facility]. She is  with a history of DM2, CAD, hypothyroid, DVT/PE, HTN who was admitted 11/12/2020 for operative management of left shoulder glenohumeral degenerative joint disease.  On 11/12/20 the patient underwent left shoulder hemiarthroplasty, biceps tenodesis.    Today she is seen for  follow up left shoulder arthroplasty,anemia  and pain management. Surgical incision C/D/I. Steri strips in place. Denies numbness or tingling.   Her pain is controlled.  She denied chest pain or shortness of breath.  Denied constipation or diarrhea.  She is with a colostomy.    She is being followed by the dietitian. PO intake fair.   Today she reports that she has abdominal  discomfort every time she takes then iron tablet. She requested to have it discontinued.  Hgb 12/2/20 was 9.1. She also reported intermittent right groin pain and feels it is related to a pinched nerve in her spine. Today she denied it.     Past Medical History:   Diagnosis Date     Abdominal adhesions      Acne rosacea      Allergic rhinitis      Alopecia      Anemia      CAD (coronary artery disease)      CKD (chronic kidney disease) stage 2, GFR 60-89 ml/min      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)      Depression      DM2 (diabetes mellitus, type 2) (H)      DVT (deep venous thrombosis) (H)      GERD (gastroesophageal reflux disease)      HTN (hypertension)      Hypertriglyceridemia      Hypokalemia      Hypothyroidism      ANNETTE on CPAP      Osteoarthritis of glenohumeral joint      Papillary carcinoma of thyroid (H)      Poliomyelitis      Pulmonary embolus (H)      Pulmonary nodule      Septic  joint of right knee joint (H)      Venous insufficiency              No family history on file.  Social History     Socioeconomic History     Marital status:      Spouse name: Not on file     Number of children: Not on file     Years of education: Not on file     Highest education level: Not on file   Occupational History     Not on file   Social Needs     Financial resource strain: Not on file     Food insecurity     Worry: Not on file     Inability: Not on file     Transportation needs     Medical: Not on file     Non-medical: Not on file   Tobacco Use     Smoking status: Never Smoker     Smokeless tobacco: Never Used   Substance and Sexual Activity     Alcohol use: Not Currently     Drug use: Not on file     Sexual activity: Not on file   Lifestyle     Physical activity     Days per week: Not on file     Minutes per session: Not on file     Stress: Not on file   Relationships     Social connections     Talks on phone: Not on file     Gets together: Not on file     Attends Mu-ism service: Not on file     Active member of club or organization: Not on file     Attends meetings of clubs or organizations: Not on file     Relationship status: Not on file     Intimate partner violence     Fear of current or ex partner: Not on file     Emotionally abused: Not on file     Physically abused: Not on file     Forced sexual activity: Not on file   Other Topics Concern     Not on file   Social History Narrative     Not on file         Review of Systems   Constitutional: Positive for activity change. Negative for appetite change, fatigue and fever.        Nonweightbearing left upper extremity   HENT: Negative for congestion.    Eyes: Positive for visual disturbance.        Wears glasses   Respiratory: Negative for cough, shortness of breath and wheezing.         Patient with allergic rhinitis on multiple medications   Cardiovascular: Negative for chest pain and leg swelling.   Gastrointestinal: Negative for abdominal  distention, abdominal pain, constipation, diarrhea and nausea.        Pt with a colostomy - she reported she had it 8 years    Genitourinary: Negative for dysuria.   Musculoskeletal: Positive for arthralgias. Negative for back pain.        Reports right knee arthritis and uses salonapa patches PRN    Skin: Positive for wound. Negative for color change.   Neurological: Negative for dizziness.   Psychiatric/Behavioral: Negative for agitation, behavioral problems and confusion.       Vitals:    12/02/20 1939   BP: 136/58   Pulse: (!) 58   Resp: 16   Temp: 97.8  F (36.6  C)   SpO2: 100%   Weight: 181 lb 6.4 oz (82.3 kg)       Physical Exam  Constitutional:       Appearance: She is well-developed.      Comments: Pleasant woman in on acute distress    HENT:      Head: Normocephalic.   Eyes:      Conjunctiva/sclera: Conjunctivae normal.   Neck:      Musculoskeletal: Normal range of motion.   Cardiovascular:      Rate and Rhythm: Normal rate and regular rhythm.      Heart sounds: Normal heart sounds. No murmur.   Pulmonary:      Effort: No respiratory distress.      Breath sounds: Normal breath sounds. No wheezing or rales.   Abdominal:      General: Bowel sounds are normal. There is no distension.      Palpations: Abdomen is soft.      Tenderness: There is no abdominal tenderness.   Musculoskeletal: Normal range of motion.      Comments: Steri strips left shoulder    Skin:     General: Skin is warm.   Neurological:      Mental Status: She is alert and oriented to person, place, and time.   Psychiatric:         Behavior: Behavior normal.           LABS:   Recent Results (from the past 240 hour(s))   Basic Metabolic Panel   Result Value Ref Range    Sodium 137 136 - 145 mmol/L    Potassium 5.7 (H) 3.5 - 5.0 mmol/L    Chloride 103 98 - 107 mmol/L    CO2 26 22 - 31 mmol/L    Anion Gap, Calculation 8 5 - 18 mmol/L    Glucose 139 (H) 70 - 125 mg/dL    Calcium 8.9 8.5 - 10.5 mg/dL    BUN 39 (H) 8 - 28 mg/dL    Creatinine 1.13 (H)  0.60 - 1.10 mg/dL    GFR MDRD Af Amer 57 (L) >60 mL/min/1.73m2    GFR MDRD Non Af Amer 47 (L) >60 mL/min/1.73m2   Basic Metabolic Panel   Result Value Ref Range    Sodium 138 136 - 145 mmol/L    Potassium 4.8 3.5 - 5.0 mmol/L    Chloride 103 98 - 107 mmol/L    CO2 27 22 - 31 mmol/L    Anion Gap, Calculation 8 5 - 18 mmol/L    Glucose 96 70 - 125 mg/dL    Calcium 8.9 8.5 - 10.5 mg/dL    BUN 34 (H) 8 - 28 mg/dL    Creatinine 1.05 0.60 - 1.10 mg/dL    GFR MDRD Af Amer >60 >60 mL/min/1.73m2    GFR MDRD Non Af Amer 51 (L) >60 mL/min/1.73m2   HM2(CBC w/o Differential)   Result Value Ref Range    WBC 8.0 4.0 - 11.0 thou/uL    RBC 3.02 (L) 3.80 - 5.40 mill/uL    Hemoglobin 9.2 (L) 12.0 - 16.0 g/dL    Hematocrit 28.4 (L) 35.0 - 47.0 %    MCV 94 80 - 100 fL    MCH 30.5 27.0 - 34.0 pg    MCHC 32.4 32.0 - 36.0 g/dL    RDW 12.7 11.0 - 14.5 %    Platelets 253 140 - 440 thou/uL    MPV 11.2 8.5 - 12.5 fL   Thyroid Stimulating Hormone (TSH)   Result Value Ref Range    TSH 14.89 (H) 0.30 - 5.00 uIU/mL   Vitamin D, Total (25-Hydroxy)   Result Value Ref Range    Vitamin D, Total (25-Hydroxy) 58.0 30.0 - 80.0 ng/mL   Vitamin B12   Result Value Ref Range    Vitamin B-12 860 (H) 213 - 816 pg/mL   Glycosylated Hemoglobin A1c   Result Value Ref Range    Hemoglobin A1c 6.2 (H) <=5.6 %   Basic Metabolic Panel   Result Value Ref Range    Sodium 136 136 - 145 mmol/L    Potassium 4.7 3.5 - 5.0 mmol/L    Chloride 105 98 - 107 mmol/L    CO2 26 22 - 31 mmol/L    Anion Gap, Calculation 5 5 - 18 mmol/L    Glucose 101 70 - 125 mg/dL    Calcium 8.7 8.5 - 10.5 mg/dL    BUN 34 (H) 8 - 28 mg/dL    Creatinine 1.03 0.60 - 1.10 mg/dL    GFR MDRD Af Amer >60 >60 mL/min/1.73m2    GFR MDRD Non Af Amer 52 (L) >60 mL/min/1.73m2   Hepatic Profile   Result Value Ref Range    Bilirubin, Total 0.2 0.0 - 1.0 mg/dL    Bilirubin, Direct 0.2 <=0.5 mg/dL    Protein, Total 5.3 (L) 6.0 - 8.0 g/dL    Albumin 2.7 (L) 3.5 - 5.0 g/dL    Alkaline Phosphatase 32 (L) 45 - 120 U/L     AST 16 0 - 40 U/L    ALT <9 0 - 45 U/L   HM1 (CBC with Diff)   Result Value Ref Range    WBC 6.6 4.0 - 11.0 thou/uL    RBC 2.93 (L) 3.80 - 5.40 mill/uL    Hemoglobin 9.1 (L) 12.0 - 16.0 g/dL    Hematocrit 27.5 (L) 35.0 - 47.0 %    MCV 94 80 - 100 fL    MCH 31.1 27.0 - 34.0 pg    MCHC 33.1 32.0 - 36.0 g/dL    RDW 12.6 11.0 - 14.5 %    Platelets 217 140 - 440 thou/uL    MPV 11.2 8.5 - 12.5 fL    Neutrophils % 51 50 - 70 %    Lymphocytes % 35 20 - 40 %    Monocytes % 9 2 - 10 %    Eosinophils % 4 0 - 6 %    Basophils % 1 0 - 2 %    Immature Granulocyte % 1 (H) <=0 %    Neutrophils Absolute 3.3 2.0 - 7.7 thou/uL    Lymphocytes Absolute 2.3 0.8 - 4.4 thou/uL    Monocytes Absolute 0.6 0.0 - 0.9 thou/uL    Eosinophils Absolute 0.2 0.0 - 0.4 thou/uL    Basophils Absolute 0.1 0.0 - 0.2 thou/uL    Immature Granulocyte Absolute 0.0 <=0.0 thou/uL     Current Outpatient Medications   Medication Sig     acetaminophen (TYLENOL) 500 MG tablet Take 1,000 mg by mouth 4 (four) times a day.      amLODIPine (NORVASC) 2.5 MG tablet Take 2.5 mg by mouth 2 (two) times a day.     aspirin 81 MG EC tablet Take 81 mg by mouth daily.     atenoloL (TENORMIN) 50 MG tablet Take 50 mg by mouth 2 (two) times a day.     azelastine (ASTEPRO) 0.15 % (205.5 mcg) Spry nasal spray Apply 1 spray into each nostril at bedtime.     biotin 1 mg tablet Take 1,000 mcg by mouth daily.     cholecalciferol, vitamin D3, 5,000 unit Tab Take 5,000 Units by mouth daily.     clotrimazole (LOTRIMIN) 1 % cream Apply 1 application topically daily as needed.     famotidine (PEPCID) 40 MG tablet Take 40 mg by mouth daily as needed for heartburn.     fenofibrate (TRICOR) 145 MG tablet Take 145 mg by mouth daily.     ferrous sulfate 325 (65 FE) MG tablet Take 1 tablet by mouth daily with breakfast.     fexofenadine (ALLEGRA) 180 MG tablet Take 180 mg by mouth daily.     fluticasone propionate (FLONASE) 50 mcg/actuation nasal spray Apply 1 spray into each nostril daily.      furosemide (LASIX) 20 MG tablet Take 20 mg by mouth daily as needed.     gabapentin (NEURONTIN) 100 MG capsule Take 100 mg by mouth every evening.     hydrOXYzine pamoate (VISTARIL) 25 MG capsule Take 25 mg by mouth 3 (three) times a day. Give with tylenol     insulin glargine U-300 conc 300 unit/mL (3 mL) InPn Inject 5 Units under the skin daily before breakfast.     levothyroxine (SYNTHROID, LEVOTHROID) 112 MCG tablet Take 112 mcg by mouth daily. Except for Fridays. And 56 mcg on Friday.     lidocaine 4 % patch Place 1 patch on the skin daily. Remove and discard patch with 12 hours. Apply to R knee     lisinopriL (PRINIVIL,ZESTRIL) 40 MG tablet Take 40 mg by mouth daily.     melatonin 3 mg Tab tablet Take 6 mg by mouth at bedtime.     mometasone (NASONEX) 50 mcg/actuation nasal spray 1 spray into each nostril 2 (two) times a day.     multivitamin (MULTIPLE VITAMINS ORAL) Take 1 tablet by mouth daily.     nitroglycerin (NITROSTAT) 0.4 MG SL tablet Place 0.4 mg under the tongue every 5 (five) minutes as needed for chest pain.     nystatin (MYCOSTATIN) cream Apply 1 application topically 2 (two) times a day as needed for dry skin.     nystatin (MYCOSTATIN) powder Apply 1 application topically daily. every Thu, Sun for use with colostomy after bath     nystatin-triamcinolone (MYCOLOG II) cream Apply 1 application topically daily as needed.     ondansetron (ZOFRAN) 4 MG tablet Take 4 mg by mouth every 8 (eight) hours as needed for nausea.     oxyCODONE (ROXICODONE) 5 MG immediate release tablet Take 1 tablet (5 mg total) by mouth every 4 (four) hours as needed for pain.     pantoprazole (PROTONIX) 40 MG tablet Take 40 mg by mouth 2 (two) times a day.     polyethylene glycol (MIRALAX) 17 gram packet Take 17 g by mouth daily as needed.     polyethylene glycol 400 (BLINK TEARS) 0.25 % Drop Apply 1 drop to eye 4 (four) times a day as needed.     senna-docusate (SENNOSIDES-DOCUSATE SODIUM) 8.6-50 mg tablet Take 1 tablet by  mouth 2 (two) times a day as needed for constipation.     SITagliptin (JANUVIA) 50 MG tablet Take 50 mg by mouth daily.     sucralfate (CARAFATE) 100 mg/mL suspension Take 1 g by mouth 2 (two) times a day as needed.     ASSESSMENT:      ICD-10-CM    1. Pain management  R52    2. Status post total shoulder arthroplasty, left  Z96.612    3. Anemia, unspecified type  D64.9        PLAN:    Anemia- HGB 9.1, requested to have ferrous sulfate discontinued.     S/P left shoulder arthroplasty- non weight bearing, Pain control, Follow up with Orthopedics, Monitor incision for S/S infection     Pain management - On PRN ES Tylenol and oxycodone, continue gabapentin     Moderate protein calorie malnutrition- Followed by the dietician, weights per facility protocol.     Hypertension-on atenolol, , lisinopril , Norvasc.     Anemia on ferrous sulfate, Last HGB 10.2 on 11/20/20    Type 2 diabetes, Continue FS, on Januvia, Lantus Aic 11/27/20 is 6.2     Allergic Rhinitis on Astepro nasal spray, Allegra,nasonex and Flonase     Hypothyroidism- Continue Synthroid TSH 14.89, Increase Synthroid to 125 mcg and recheck lab in 6 weeks.     GERD on Protonix.Pepcid, and Carafate      Electronically signed by: Luly Ray CNP

## 2021-06-13 NOTE — PROGRESS NOTES
[facility]    [id number]   Code Status: FULL CODE      CHIEF COMPLAINT/REASON FOR VISIT:  Chief Complaint   Patient presents with     Review Of Multiple Medical Conditions     F/U left shoulder hemiarthrioplasty. pain control       HISTORY:      HPI: Chasity is a 75 y.o. female undergoing physical and occupational therapy at [facility]. She is  with a history of DM2, CAD, hypothyroid, DVT/PE, HTN who was admitted 11/12/2020 for operative management of left shoulder glenohumeral degenerative joint disease.  On 11/12/20 the patient underwent left shoulder hemiarthroplasty, biceps tenodesis.    Today she is seen for  follow up left shoulder arthroplasty, review labs  and pain management. Surgical incision C/D/I. Steri strips in place. She reports having some numbness in her hand yesterday but it has resolved.  Her pain is controlled.  She denied chest pain or shortness of breath.  Denied constipation or diarrhea.  She is with a colostomy.    She is being followed by the dietitian. PO intake fair.   She was noted to have an elevated BP but in review it was an isolated reading. B12 slightly elevated, will check BMP and LFT's    Past Medical History:   Diagnosis Date     Abdominal adhesions      Acne rosacea      Allergic rhinitis      Alopecia      Anemia      CAD (coronary artery disease)      CKD (chronic kidney disease) stage 2, GFR 60-89 ml/min      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)      Depression      DM2 (diabetes mellitus, type 2) (H)      DVT (deep venous thrombosis) (H)      GERD (gastroesophageal reflux disease)      HTN (hypertension)      Hypertriglyceridemia      Hypokalemia      Hypothyroidism      ANNETTE on CPAP      Osteoarthritis of glenohumeral joint      Papillary carcinoma of thyroid (H)      Poliomyelitis      Pulmonary embolus (H)      Pulmonary nodule      Septic joint of right knee joint (H)      Venous insufficiency               No family history on file.  Social History     Socioeconomic History     Marital status:      Spouse name: Not on file     Number of children: Not on file     Years of education: Not on file     Highest education level: Not on file   Occupational History     Not on file   Social Needs     Financial resource strain: Not on file     Food insecurity     Worry: Not on file     Inability: Not on file     Transportation needs     Medical: Not on file     Non-medical: Not on file   Tobacco Use     Smoking status: Never Smoker     Smokeless tobacco: Never Used   Substance and Sexual Activity     Alcohol use: Not Currently     Drug use: Not on file     Sexual activity: Not on file   Lifestyle     Physical activity     Days per week: Not on file     Minutes per session: Not on file     Stress: Not on file   Relationships     Social connections     Talks on phone: Not on file     Gets together: Not on file     Attends [employer] service: Not on file     Active member of club or organization: Not on file     Attends meetings of clubs or organizations: Not on file     Relationship status: Not on file     Intimate partner violence     Fear of current or ex partner: Not on file     Emotionally abused: Not on file     Physically abused: Not on file     Forced sexual activity: Not on file   Other Topics Concern     Not on file   Social History Narrative     Not on file         Review of Systems   Constitutional: Positive for activity change. Negative for appetite change, fatigue and fever.        Nonweightbearing left upper extremity   HENT: Negative for congestion.    Eyes: Positive for visual disturbance.        Wears glasses   Respiratory: Negative for cough, shortness of breath and wheezing.         Patient with allergic rhinitis on multiple medications   Cardiovascular: Negative for chest pain and leg swelling.   Gastrointestinal: Negative for abdominal distention, abdominal pain, constipation, diarrhea and nausea.         Pt with a colostomy - she reported she had it 8 years    Genitourinary: Negative for dysuria.   Musculoskeletal: Positive for arthralgias. Negative for back pain.        Reports right knee arthritis and uses salonapa patches PRN    Skin: Positive for wound. Negative for color change.   Neurological: Negative for dizziness.   Psychiatric/Behavioral: Negative for agitation, behavioral problems and confusion.       Vitals:    11/26/20 1405   BP: (!) 185/62   Pulse: 65   Resp: 16   Temp: 97.3  F (36.3  C)   SpO2: 97%   Weight: 181 lb 14.4 oz (82.5 kg)       Physical Exam  Constitutional:       Appearance: She is well-developed.      Comments: Pleasant woman in on acute distress    HENT:      Head: Normocephalic.   Eyes:      Conjunctiva/sclera: Conjunctivae normal.   Neck:      Musculoskeletal: Normal range of motion.   Cardiovascular:      Rate and Rhythm: Normal rate and regular rhythm.      Heart sounds: Normal heart sounds. No murmur.   Pulmonary:      Effort: No respiratory distress.      Breath sounds: Normal breath sounds. No wheezing or rales.   Abdominal:      General: Bowel sounds are normal. There is no distension.      Palpations: Abdomen is soft.      Tenderness: There is no abdominal tenderness.   Musculoskeletal: Normal range of motion.      Comments: Steri strips left shoulder    Skin:     General: Skin is warm.   Neurological:      Mental Status: She is alert and oriented to person, place, and time.   Psychiatric:         Behavior: Behavior normal.           LABS:   Recent Results (from the past 240 hour(s))   Basic Metabolic Panel   Result Value Ref Range    Sodium 134 (L) 136 - 145 mmol/L    Potassium 5.6 (H) 3.5 - 5.0 mmol/L    Chloride 101 98 - 107 mmol/L    CO2 26 22 - 31 mmol/L    Anion Gap, Calculation 7 5 - 18 mmol/L    Glucose 125 70 - 125 mg/dL    Calcium 8.6 8.5 - 10.5 mg/dL    BUN 35 (H) 8 - 28 mg/dL    Creatinine 1.15 (H) 0.60 - 1.10 mg/dL    GFR MDRD Af Amer 56 (L) >60 mL/min/1.73m2     GFR MDRD Non Af Amer 46 (L) >60 mL/min/1.73m2   Hepatic Profile   Result Value Ref Range    Bilirubin, Total 0.3 0.0 - 1.0 mg/dL    Bilirubin, Direct 0.1 <=0.5 mg/dL    Protein, Total 5.3 (L) 6.0 - 8.0 g/dL    Albumin 2.7 (L) 3.5 - 5.0 g/dL    Alkaline Phosphatase 45 45 - 120 U/L    AST 18 0 - 40 U/L    ALT 10 0 - 45 U/L   Lipase   Result Value Ref Range    Lipase 15 0 - 52 U/L   Hemoglobin   Result Value Ref Range    Hemoglobin 10.2 (L) 12.0 - 16.0 g/dL   Basic Metabolic Panel   Result Value Ref Range    Sodium 137 136 - 145 mmol/L    Potassium 5.7 (H) 3.5 - 5.0 mmol/L    Chloride 103 98 - 107 mmol/L    CO2 26 22 - 31 mmol/L    Anion Gap, Calculation 8 5 - 18 mmol/L    Glucose 139 (H) 70 - 125 mg/dL    Calcium 8.9 8.5 - 10.5 mg/dL    BUN 39 (H) 8 - 28 mg/dL    Creatinine 1.13 (H) 0.60 - 1.10 mg/dL    GFR MDRD Af Amer 57 (L) >60 mL/min/1.73m2    GFR MDRD Non Af Amer 47 (L) >60 mL/min/1.73m2   Basic Metabolic Panel   Result Value Ref Range    Sodium 138 136 - 145 mmol/L    Potassium 4.8 3.5 - 5.0 mmol/L    Chloride 103 98 - 107 mmol/L    CO2 27 22 - 31 mmol/L    Anion Gap, Calculation 8 5 - 18 mmol/L    Glucose 96 70 - 125 mg/dL    Calcium 8.9 8.5 - 10.5 mg/dL    BUN 34 (H) 8 - 28 mg/dL    Creatinine 1.05 0.60 - 1.10 mg/dL    GFR MDRD Af Amer >60 >60 mL/min/1.73m2    GFR MDRD Non Af Amer 51 (L) >60 mL/min/1.73m2   HM2(CBC w/o Differential)   Result Value Ref Range    WBC 8.0 4.0 - 11.0 thou/uL    RBC 3.02 (L) 3.80 - 5.40 mill/uL    Hemoglobin 9.2 (L) 12.0 - 16.0 g/dL    Hematocrit 28.4 (L) 35.0 - 47.0 %    MCV 94 80 - 100 fL    MCH 30.5 27.0 - 34.0 pg    MCHC 32.4 32.0 - 36.0 g/dL    RDW 12.7 11.0 - 14.5 %    Platelets 253 140 - 440 thou/uL    MPV 11.2 8.5 - 12.5 fL   Thyroid Stimulating Hormone (TSH)   Result Value Ref Range    TSH 14.89 (H) 0.30 - 5.00 uIU/mL   Vitamin B12   Result Value Ref Range    Vitamin B-12 860 (H) 213 - 816 pg/mL   Glycosylated Hemoglobin A1c   Result Value Ref Range    Hemoglobin A1c 6.2  (H) <=5.6 %     Current Outpatient Medications   Medication Sig     acetaminophen (TYLENOL) 500 MG tablet Take 1,000 mg by mouth 4 (four) times a day.      amLODIPine (NORVASC) 2.5 MG tablet Take 2.5 mg by mouth 2 (two) times a day.     aspirin 81 MG EC tablet Take 81 mg by mouth daily.     atenoloL (TENORMIN) 50 MG tablet Take 50 mg by mouth 2 (two) times a day.     azelastine (ASTEPRO) 0.15 % (205.5 mcg) Spry nasal spray Apply 1 spray into each nostril at bedtime.     biotin 1 mg tablet Take 1,000 mcg by mouth daily.     cholecalciferol, vitamin D3, 5,000 unit Tab Take 5,000 Units by mouth daily.     clotrimazole (LOTRIMIN) 1 % cream Apply 1 application topically daily as needed.     famotidine (PEPCID) 40 MG tablet Take 40 mg by mouth daily as needed for heartburn.     fenofibrate (TRICOR) 145 MG tablet Take 145 mg by mouth daily.     ferrous sulfate 325 (65 FE) MG tablet Take 1 tablet by mouth daily with breakfast.     fexofenadine (ALLEGRA) 180 MG tablet Take 180 mg by mouth daily.     fluticasone propionate (FLONASE) 50 mcg/actuation nasal spray Apply 1 spray into each nostril daily.     furosemide (LASIX) 20 MG tablet Take 20 mg by mouth daily as needed.     gabapentin (NEURONTIN) 100 MG capsule Take 100 mg by mouth every evening.     hydrOXYzine pamoate (VISTARIL) 25 MG capsule Take 25 mg by mouth 3 (three) times a day. Give with tylenol     insulin glargine U-300 conc 300 unit/mL (3 mL) InPn Inject 5 Units under the skin daily before breakfast.     levothyroxine (SYNTHROID, LEVOTHROID) 112 MCG tablet Take 112 mcg by mouth daily. Except for Fridays. And 56 mcg on Friday.     lidocaine 4 % patch Place 1 patch on the skin daily. Remove and discard patch with 12 hours. Apply to R knee     lisinopriL (PRINIVIL,ZESTRIL) 40 MG tablet Take 40 mg by mouth daily.     melatonin 3 mg Tab tablet Take 6 mg by mouth at bedtime.     mometasone (NASONEX) 50 mcg/actuation nasal spray 1 spray into each nostril 2 (two) times a  day.     multivitamin (MULTIPLE VITAMINS ORAL) Take 1 tablet by mouth daily.     nitroglycerin (NITROSTAT) 0.4 MG SL tablet Place 0.4 mg under the tongue every 5 (five) minutes as needed for chest pain.     nystatin (MYCOSTATIN) cream Apply 1 application topically 2 (two) times a day as needed for dry skin.     nystatin (MYCOSTATIN) powder Apply 1 application topically daily. every Thu, Sun for use with colostomy after bath     nystatin-triamcinolone (MYCOLOG II) cream Apply 1 application topically daily as needed.     ondansetron (ZOFRAN) 4 MG tablet Take 4 mg by mouth every 8 (eight) hours as needed for nausea.     oxyCODONE (ROXICODONE) 5 MG immediate release tablet Take 1 tablet (5 mg total) by mouth every 4 (four) hours as needed for pain.     pantoprazole (PROTONIX) 40 MG tablet Take 40 mg by mouth 2 (two) times a day.     polyethylene glycol (MIRALAX) 17 gram packet Take 17 g by mouth daily as needed.     polyethylene glycol 400 (BLINK TEARS) 0.25 % Drop Apply 1 drop to eye 4 (four) times a day as needed.     senna-docusate (SENNOSIDES-DOCUSATE SODIUM) 8.6-50 mg tablet Take 1 tablet by mouth 2 (two) times a day as needed for constipation.     SITagliptin (JANUVIA) 50 MG tablet Take 50 mg by mouth daily.     sucralfate (CARAFATE) 100 mg/mL suspension Take 1 g by mouth 2 (two) times a day as needed.     ASSESSMENT:      ICD-10-CM    1. Pain management  R52    2. Status post total replacement of left shoulder  Z96.612    3. Secondary hypertension  I15.9        PLAN:      S/P left shoulder arthroplasty- non weight bearing, Pain control, Follow up with Orthopedics, Monitor incision for S/S infection     Pain management - On PRN ES Tylenol and oxycodone, continue gabapentin     Moderate protein calorie malnutrition- Followed by the dietician, weights per facility protocol.     Hypertension-on atenolol, , lisinopril , Norvasc.     Anemia on ferrous sulfate, Last HGB 10.2 on 11/20/20    Type 2 diabetes, Continue FS,  on Januvia, Lantus Aic 11/27/20 is 6.2     Allergic Rhinitis on Astepro nasal spray, Allegra,nasonex and Flonase     Hypothyroidism- Continue Synthroid TSH 14.89, Increase Synthroid to 125 mcg and recheck lab in 6 weeks.     GERD on Protonix.Pepcid, and Carafate      Electronically signed by: Luly Ray CNP

## 2021-06-13 NOTE — TELEPHONE ENCOUNTER
[facility] Nurse Triage Telephone Note      Provider: KAYE Wang  Facility: [facility]    Facility Type: TCU    Caller: Tash  Call Back Number:  [phone]    Allergies: Ketorolac, Celecoxib, Codeine, Sulfa (sulfonamide antibiotics), Nsaids (non-steroidal anti-inflammatory drug), Rofecoxib, Rosuvastatin, and Conjugated estrogens    Reason for call: Nurse calling to report BMP results.  Patient had a dose of Kayexalate yesterday due to hyperkalemia.  Other notable meds:  Atenolol 50mg two times a day, Lisinopril 40mg daily, Norvasc 2.5mg two times a day, Lasix 20mg daily PRN.  Patient is due to have the following labs drawn on 11/27/20:  Heme 1, HgbA1c, TSH, Vitamin B12, and Vitamin D.       Verbal Order/Direction given by Provider: Add a BMP and Mg level on to the labs due for 11/27/20.      Provider giving order: KAYE Wang    Verbal order given to: Tash Mcintosh RN

## 2021-06-13 NOTE — PROGRESS NOTES
[facility]      Facility:    [facility] [[id number]]  Code Status: FULL CODE      Chief Complaint/Reason for Visit:   Chief Complaint   Patient presents with     Review Of Multiple Medical Conditions     malnutrition F/U left shoulder hemiarthroplasty, pain control        HPI:   Chasity is a 75 y.o. female who is a transfer from [facility] with an admission on 11/12/20 and discharge on 11/17/20. She has a PMH of DM2, CAD, hypothyroidism, DVT/PE, Htn, who underwent operative management of a left shouder glenohumeral degenerative joint disease with a hemiarthroplasty and biceps tenodesis. Postoperatively no issues other than some nausea which was remedied. She was given WBAT and tylenol/oxycodone for pain control. She was then discharged to the TCU.    Past Medical History:  Past Medical History:   Diagnosis Date     Abdominal adhesions      Acne rosacea      Allergic rhinitis      Alopecia      Anemia      CAD (coronary artery disease)      CKD (chronic kidney disease) stage 2, GFR 60-89 ml/min      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)      Depression      DM2 (diabetes mellitus, type 2) (H)      DVT (deep venous thrombosis) (H)      GERD (gastroesophageal reflux disease)      HTN (hypertension)      Hypertriglyceridemia      Hypokalemia      Hypothyroidism      ANNETTE on CPAP      Osteoarthritis of glenohumeral joint      Papillary carcinoma of thyroid (H)      Poliomyelitis      Pulmonary embolus (H)      Pulmonary nodule      Septic joint of right knee joint (H)      Venous insufficiency            Surgical History:  Past Surgical History:   Procedure Laterality Date     APPENDECTOMY       CARPAL TUNNEL RELEASE       CHOLECYSTECTOMY       COLOSTOMY       FOOT ARTHRODESIS Right     Right foot triple arthrodesis and removal of hardware     ROTATOR CUFF REPAIR       SHOULDER ARTHROSCOPY  06/25/2015    REVISION SUBACROMIAL DECOMPRESSION, EXCISION OF GANGLION CYST, DEBRIDEMENT  AND EXCISION OF THE GLENOHUMERAL JOINT GANGLION CYST, CORACOID DECOMPRESSION, POSSIBLE SUBSCAPULARIS REPAIR AND OPEN SUBSCAPULARIS BICEP     SHOULDER SURGERY  11/12/2020    LEFT SHOULDER HEMIARHTROPLASTY, BICEP TENODESIS     TENDON RELEASE      foot     THYROIDECTOMY       TOE AMPUTATION       TOTAL ABDOMINAL HYSTERECTOMY         Family History:   No family history on file.    Social History:    Social History     Socioeconomic History     Marital status:      Spouse name: Not on file     Number of children: Not on file     Years of education: Not on file     Highest education level: Not on file   Occupational History     Not on file   Social Needs     Financial resource strain: Not on file     Food insecurity     Worry: Not on file     Inability: Not on file     Transportation needs     Medical: Not on file     Non-medical: Not on file   Tobacco Use     Smoking status: Never Smoker     Smokeless tobacco: Never Used   Substance and Sexual Activity     Alcohol use: Not Currently     Drug use: Not on file     Sexual activity: Not on file   Lifestyle     Physical activity     Days per week: Not on file     Minutes per session: Not on file     Stress: Not on file   Relationships     Social connections     Talks on phone: Not on file     Gets together: Not on file     Attends Anabaptism service: Not on file     Active member of club or organization: Not on file     Attends meetings of clubs or organizations: Not on file     Relationship status: Not on file     Intimate partner violence     Fear of current or ex partner: Not on file     Emotionally abused: Not on file     Physically abused: Not on file     Forced sexual activity: Not on file   Other Topics Concern     Not on file   Social History Narrative     Not on file          Review of Systems   Constitutional: Positive for activity change. Negative for appetite change, chills, diaphoresis and fatigue.   HENT: Negative for congestion and hearing loss.    Eyes:  "Negative.    Cardiovascular: Positive for leg swelling.   Gastrointestinal: Negative for abdominal distention, abdominal pain, blood in stool, constipation, diarrhea and nausea.   Endocrine: Negative.    Genitourinary: Negative for difficulty urinating.   Musculoskeletal:        L UE   Skin:        L shoulder   Allergic/Immunologic: Negative.    Neurological: Negative for dizziness, tremors, speech difficulty and light-headedness.   Hematological: Negative.    Psychiatric/Behavioral: Positive for sleep disturbance. Negative for agitation, confusion and hallucinations. The patient is not nervous/anxious.        Vitals:    11/24/20 0742   BP: 118/62   Pulse: 62   Resp: 16   Temp: 97.3  F (36.3  C)   SpO2: 98%   Weight: 183 lb (83 kg)   Height: 4' 10.5\" (1.486 m)       Physical Exam  Constitutional:       Appearance: She is obese.   HENT:      Head: Normocephalic.      Right Ear: External ear normal.      Left Ear: External ear normal.      Nose: No congestion.      Mouth/Throat:      Pharynx: No oropharyngeal exudate.   Eyes:      General:         Right eye: No discharge.         Left eye: No discharge.   Neck:      Musculoskeletal: Normal range of motion.   Cardiovascular:      Rate and Rhythm: Normal rate.      Pulses: Normal pulses.      Heart sounds: No murmur.   Pulmonary:      Effort: Pulmonary effort is normal. No respiratory distress.      Comments: RA, CTA  Abdominal:      General: There is no distension.      Palpations: Abdomen is soft.      Tenderness: There is no abdominal tenderness. There is no guarding.      Comments: Denies constipation or diarrhea, colostomy patent   Genitourinary:     Comments: No issues  Musculoskeletal:      Right lower leg: Edema present.      Left lower leg: Edema present.      Comments: Non pitting dependent BLE, has R AFO, pain in L UE. WBAT, wearing sling   Skin:     General: Skin is warm.      Comments: L shoulder incision intact   Neurological:      General: No focal " deficit present.      Mental Status: She is oriented to person, place, and time.   Psychiatric:         Mood and Affect: Mood normal.         Behavior: Behavior normal.         Medication List:  Current Outpatient Medications   Medication Sig     acetaminophen (TYLENOL) 500 MG tablet Take 1,000 mg by mouth every 6 (six) hours as needed for pain.     amLODIPine (NORVASC) 2.5 MG tablet Take 2.5 mg by mouth 2 (two) times a day.     aspirin 81 MG EC tablet Take 81 mg by mouth daily.     atenoloL (TENORMIN) 50 MG tablet Take 50 mg by mouth 2 (two) times a day.     azelastine (ASTEPRO) 0.15 % (205.5 mcg) Spry nasal spray Apply 1 spray into each nostril at bedtime.     biotin 1 mg tablet Take 1,000 mcg by mouth daily.     cholecalciferol, vitamin D3, 5,000 unit Tab Take 5,000 Units by mouth daily.     clotrimazole (LOTRIMIN) 1 % cream Apply 1 application topically daily as needed.     diphenhydrAMINE-acetaminophen (TYLENOL PM)  mg Tab Take 1 tablet by mouth at bedtime as needed.     famotidine (PEPCID) 40 MG tablet Take 40 mg by mouth daily as needed for heartburn.     fenofibrate (TRICOR) 145 MG tablet Take 145 mg by mouth daily.     ferrous sulfate 325 (65 FE) MG tablet Take 1 tablet by mouth daily with breakfast.     fexofenadine (ALLEGRA) 180 MG tablet Take 180 mg by mouth daily.     fluticasone propionate (FLONASE) 50 mcg/actuation nasal spray Apply 1 spray into each nostril daily.     furosemide (LASIX) 20 MG tablet Take 20 mg by mouth daily as needed.     gabapentin (NEURONTIN) 100 MG capsule Take 100 mg by mouth every evening.     insulin glargine U-300 conc 300 unit/mL (3 mL) InPn Inject 5 Units under the skin daily before breakfast.     levothyroxine (SYNTHROID, LEVOTHROID) 112 MCG tablet Take 112 mcg by mouth daily. Except for Fridays. And 56 mcg on Friday.     lisinopriL (PRINIVIL,ZESTRIL) 40 MG tablet Take 40 mg by mouth daily.     mometasone (NASONEX) 50 mcg/actuation nasal spray 1 spray into each  nostril 2 (two) times a day.     multivitamin (MULTIPLE VITAMINS ORAL) Take 1 tablet by mouth daily.     nitroglycerin (NITROSTAT) 0.4 MG SL tablet Place 0.4 mg under the tongue every 5 (five) minutes as needed for chest pain.     nystatin (MYCOSTATIN) cream Apply 1 application topically 2 (two) times a day as needed for dry skin.     nystatin (MYCOSTATIN) powder Apply 1 application topically daily. every Thu, Sun for use with colostomy after bath     nystatin-triamcinolone (MYCOLOG II) cream Apply 1 application topically daily as needed.     ondansetron (ZOFRAN) 4 MG tablet Take 4 mg by mouth every 8 (eight) hours as needed for nausea.     oxyCODONE (ROXICODONE) 5 MG immediate release tablet Take 1 tablet (5 mg total) by mouth every 4 (four) hours as needed for pain.     pantoprazole (PROTONIX) 40 MG tablet Take 40 mg by mouth 2 (two) times a day.     polyethylene glycol (MIRALAX) 17 gram packet Take 17 g by mouth daily as needed.     polyethylene glycol 400 (BLINK TEARS) 0.25 % Drop Apply 1 drop to eye 4 (four) times a day as needed.     senna-docusate (SENNOSIDES-DOCUSATE SODIUM) 8.6-50 mg tablet Take 1 tablet by mouth 2 (two) times a day as needed for constipation.     SITagliptin (JANUVIA) 50 MG tablet Take 50 mg by mouth daily.     sucralfate (CARAFATE) 100 mg/mL suspension Take 1 g by mouth 2 (two) times a day as needed.       Labs:  Results for orders placed or performed in visit on 11/20/20   Basic Metabolic Panel   Result Value Ref Range    Sodium 134 (L) 136 - 145 mmol/L    Potassium 5.6 (H) 3.5 - 5.0 mmol/L    Chloride 101 98 - 107 mmol/L    CO2 26 22 - 31 mmol/L    Anion Gap, Calculation 7 5 - 18 mmol/L    Glucose 125 70 - 125 mg/dL    Calcium 8.6 8.5 - 10.5 mg/dL    BUN 35 (H) 8 - 28 mg/dL    Creatinine 1.15 (H) 0.60 - 1.10 mg/dL    GFR MDRD Af Amer 56 (L) >60 mL/min/1.73m2    GFR MDRD Non Af Amer 46 (L) >60 mL/min/1.73m2     Lab Results   Component Value Date    HGB 10.2 (L) 11/20/2020     No results  found for: [id number]    No results found for: TSH    No results found for: HGBA1C    No results found for: [id number]      Assessment/Plan:    Left shouder glenohumeral degenerative joint disease with a hemiarthroplasty and biceps tenodesis: WBAT, f/u with ortho as ordered    Pain control: continue tylenol 1000mg four times a day and oxycodone 5mg q4h PRN (4x daily). Start vistaril 25mg three times a day, encourage icing    Acute hyperkalemia: last K 5.7 on 11/24, will give kayexalate 15gm x2 doses stat, recheck BMP on 11/25    R knee pain: continue lidocaine patch    CAD: continue ASA 81mg daily, statin and fenofibrate     HTN: continue lisinopril 40mg daily, amlodipine 2.5mg daily and atenolol 50mg BID    Allergies: continue fexofenadine and flonase daily. Advised to change allegra to PRN though reluctulant currently     Hypothyroidism: continue synthroid 112mcg daily, recheck TSH    DM2: continue U-300 5U daily, Januvia 50mg daily. BS <200    Neuropathy: continue gabapentin 100mg at HS    GERD: continue protonix 40mg two times a day, sucralfate 10mg two times a day PRN and pepcid 40mg daily PRN. Also has zofran PRN    Abdominal pain: Abx negative, resolved. Possibly d/t GERD    Anemia: continue FeSO4 325mg daily, last Hgb 10.2, recheck CBC    Vit D def: continue D3 5000U daily, recheck    Insomnia: start melatonin 6mg at HS, discontinue tylenol PM    Morbid obesity: last BMI 37.6    Constipation: continue senna S 1 tab two times a day and miralax daily PRN    Labs: BMP, CBC, mag, TSH, vit D B12, A1c    Disposition: lives with son who has cerebral palsy. [facility] 23/30    The care plan has been reviewed and all orders signed. Changes to care plan, if any, as noted. Otherwise, continue care plan of care.  The total time spent with this patient/staff was 38 minutes, with greater than 50% spent in counseling and coordination of care that included multiple issues pain control, hyperkalemia, GERD and therapy, which  lasted 20 minutes.         Electronically signed by: Leonides Acuna NP

## 2021-06-13 NOTE — PROGRESS NOTES
[facility]      Facility:    [facility] [[id number]]  Code Status: UNKNOWN      Chief Complaint/Reason for Visit:  Chief Complaint   Patient presents with     H & P     This is a glenohumeral joint, hypertension, diabetes type 2, coronary disease, history of DVT, chief complaint today of nausea and abdominal discomfort intermittent without chest pain or shortness of breath.       HPI:   Chasity is a 75 y.o. female who was recently admitted to the hospital 11/12/2020.  She does have type 2 diabetes which is in decent control at this time with hypertension DVT.  Left shoulder glenohumeral degenerative joint disease was her diagnosis and she underwent left shoulder hemiarthroplasty.  She also went to bicep but biceps tenodesis and there were no perioperative postoperative complications.  She does some constipation which resolved and she does have a colostomy bag.  She is currently on Protonix as well as Carafate at this time and she still has loose stools.  I cannot rule out a bowel obstruction and bowel sounds are faint today.  She says this is happened about once a day usually in the evenings and she has vomited once.  She has no fevers chills or chest pain or shortness of breath.  Her pain is well managed with medications.    Past Medical History:  Past Medical History:   Diagnosis Date     Abdominal adhesions      Acne rosacea      Allergic rhinitis      Alopecia      Anemia      CAD (coronary artery disease)      CKD (chronic kidney disease) stage 2, GFR 60-89 ml/min      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)      Depression      DM2 (diabetes mellitus, type 2) (H)      DVT (deep venous thrombosis) (H)      GERD (gastroesophageal reflux disease)      HTN (hypertension)      Hypertriglyceridemia      Hypokalemia      Hypothyroidism      ANNETTE on CPAP      Osteoarthritis of glenohumeral joint      Papillary carcinoma of thyroid (H)      Poliomyelitis      Pulmonary embolus (H)       Pulmonary nodule      Septic joint of right knee joint (H)      Venous insufficiency            Surgical History:  Past Surgical History:   Procedure Laterality Date     APPENDECTOMY       CARPAL TUNNEL RELEASE       CHOLECYSTECTOMY       COLOSTOMY       FOOT ARTHRODESIS Right     Right foot triple arthrodesis and removal of hardware     ROTATOR CUFF REPAIR       SHOULDER ARTHROSCOPY  06/25/2015    REVISION SUBACROMIAL DECOMPRESSION, EXCISION OF GANGLION CYST, DEBRIDEMENT AND EXCISION OF THE GLENOHUMERAL JOINT GANGLION CYST, CORACOID DECOMPRESSION, POSSIBLE SUBSCAPULARIS REPAIR AND OPEN SUBSCAPULARIS BICEP     SHOULDER SURGERY  11/12/2020    LEFT SHOULDER HEMIARHTROPLASTY, BICEP TENODESIS     TENDON RELEASE      foot     THYROIDECTOMY       TOE AMPUTATION       TOTAL ABDOMINAL HYSTERECTOMY         Family History:   History reviewed. No pertinent family history.    Social History:    Social History     Socioeconomic History     Marital status:      Spouse name: None     Number of children: None     Years of education: None     Highest education level: None   Occupational History     None   Social Needs     Financial resource strain: None     Food insecurity     Worry: None     Inability: None     Transportation needs     Medical: None     Non-medical: None   Tobacco Use     Smoking status: Never Smoker     Smokeless tobacco: Never Used   Substance and Sexual Activity     Alcohol use: Not Currently     Drug use: None     Sexual activity: None   Lifestyle     Physical activity     Days per week: None     Minutes per session: None     Stress: None   Relationships     Social connections     Talks on phone: None     Gets together: None     Attends Yarsani service: None     Active member of club or organization: None     Attends meetings of clubs or organizations: None     Relationship status: None     Intimate partner violence     Fear of current or ex partner: None     Emotionally abused: None     Physically  abused: None     Forced sexual activity: None   Other Topics Concern     None   Social History Narrative     None          Review of Systems   Constitutional:        Positive for nausea and abdominal pain intermittent.  None at this time.  She denies any fevers chills change in vision hearing taste or smell weakness one-sided other.  She has no chest pain or shortness of breath.  She is sleeping okay at night and appetite is okay.  The remainder the view of systems negative is no depression or anxiety and she is urinating without urgency frequency or burning with urination.       Vitals:    11/20/20 1216   BP: 134/83   Pulse: (!) 57   Resp: 16   Temp: 97.3  F (36.3  C)   SpO2: 97%       Physical Exam  Constitutional:       General: She is not in acute distress.     Appearance: She is not toxic-appearing.   HENT:      Head: Normocephalic and atraumatic.      Nose: Nose normal.      Mouth/Throat:      Mouth: Mucous membranes are moist.   Cardiovascular:      Rate and Rhythm: Normal rate and regular rhythm.   Pulmonary:      Effort: Pulmonary effort is normal. No respiratory distress.      Breath sounds: Normal breath sounds.   Abdominal:      General: There is distension.      Comments: And has no rebound or guarding but there is epigastric pain little bit with palpation but is very mild.   Skin:     General: Skin is warm and dry.   Neurological:      Mental Status: She is alert. Mental status is at baseline.         Medication List:  Current Outpatient Medications   Medication Sig     acetaminophen (TYLENOL) 500 MG tablet Take 1,000 mg by mouth every 6 (six) hours as needed for pain.     amLODIPine (NORVASC) 2.5 MG tablet Take 2.5 mg by mouth 2 (two) times a day.     aspirin 81 MG EC tablet Take 81 mg by mouth daily.     atenoloL (TENORMIN) 50 MG tablet Take 50 mg by mouth 2 (two) times a day.     azelastine (ASTEPRO) 0.15 % (205.5 mcg) Spry nasal spray Apply 1 spray into each nostril at bedtime.     biotin 1 mg  tablet Take 1,000 mcg by mouth daily.     cholecalciferol, vitamin D3, 5,000 unit Tab Take 5,000 Units by mouth daily.     clotrimazole (LOTRIMIN) 1 % cream Apply 1 application topically daily as needed.     diphenhydrAMINE-acetaminophen (TYLENOL PM)  mg Tab Take 1 tablet by mouth at bedtime as needed.     famotidine (PEPCID) 40 MG tablet Take 40 mg by mouth daily as needed for heartburn.     fenofibrate (TRICOR) 145 MG tablet Take 145 mg by mouth daily.     ferrous sulfate 325 (65 FE) MG tablet Take 1 tablet by mouth daily with breakfast.     fexofenadine (ALLEGRA) 180 MG tablet Take 180 mg by mouth daily.     fluticasone propionate (FLONASE) 50 mcg/actuation nasal spray Apply 1 spray into each nostril daily.     furosemide (LASIX) 20 MG tablet Take 20 mg by mouth daily as needed.     gabapentin (NEURONTIN) 100 MG capsule Take 100 mg by mouth every evening.     insulin glargine U-300 conc 300 unit/mL (3 mL) InPn Inject 5 Units under the skin daily before breakfast.     levothyroxine (SYNTHROID, LEVOTHROID) 112 MCG tablet Take 112 mcg by mouth daily. Except for Fridays. And 56 mcg on Friday.     lisinopriL (PRINIVIL,ZESTRIL) 40 MG tablet Take 40 mg by mouth daily.     mometasone (NASONEX) 50 mcg/actuation nasal spray 1 spray into each nostril 2 (two) times a day.     multivitamin (MULTIPLE VITAMINS ORAL) Take 1 tablet by mouth daily.     nitroglycerin (NITROSTAT) 0.4 MG SL tablet Place 0.4 mg under the tongue every 5 (five) minutes as needed for chest pain.     nystatin (MYCOSTATIN) cream Apply 1 application topically 2 (two) times a day as needed for dry skin.     nystatin (MYCOSTATIN) powder Apply 1 application topically daily. every Thu, Sun for use with colostomy after bath     nystatin-triamcinolone (MYCOLOG II) cream Apply 1 application topically daily as needed.     ondansetron (ZOFRAN) 4 MG tablet Take 4 mg by mouth every 8 (eight) hours as needed for nausea.     oxyCODONE (ROXICODONE) 5 MG immediate  release tablet Take 1 tablet (5 mg total) by mouth every 4 (four) hours as needed for pain.     pantoprazole (PROTONIX) 40 MG tablet Take 40 mg by mouth 2 (two) times a day.     polyethylene glycol (MIRALAX) 17 gram packet Take 17 g by mouth daily as needed.     polyethylene glycol 400 (BLINK TEARS) 0.25 % Drop Apply 1 drop to eye 4 (four) times a day as needed.     senna-docusate (SENNOSIDES-DOCUSATE SODIUM) 8.6-50 mg tablet Take 1 tablet by mouth 2 (two) times a day as needed for constipation.     SITagliptin (JANUVIA) 50 MG tablet Take 50 mg by mouth daily.     sucralfate (CARAFATE) 100 mg/mL suspension Take 1 g by mouth 2 (two) times a day as needed.       Labs: Pending today.      Assessment:    ICD-10-CM    1. Primary osteoarthritis of left shoulder  M19.012    2. Pain management  R52    3. Nausea  R11.0    4. Epigastric pain  R10.13    5. Type 2 diabetes mellitus with other specified complication, unspecified whether long term insulin use (H)  E11.69        Plan: Plan at this time will continue physical and occupational therapy and pain management as ordered.  I did review her blood sugars and they are acceptable at this time with no changes will be made.  We will get an abdominal flat and upright secondary to pain constipation in her abdomen and LFTs lipase hemoglobin done stat today secondary abdominal pain.  The rational for the hemoglobin case she is having some kind of a bleed to monitor that.  I cannot rule out anything else at this time until I have those back.  Basic metabolic profile done today stat secondary to loose stools through her colostomy that she said is much looser and watery than normal.  She will continue on the Zofran the sucralfate as well as the remainder of her medications without changes.  I will continue to monitor above medical problems and no other changes to care plan at this time.        Electronically signed by: Dutch Chowdhury DO

## 2021-06-13 NOTE — PROGRESS NOTES
[facility]      Facility:    [facility] [[id number]]  Code Status: FULL CODE      Chief Complaint/Reason for Visit:   Chief Complaint   Patient presents with     Review Of Multiple Medical Conditions     malnutrition F/U left shoulder hemiarthroplasty, pain control        HPI:   Chasity is a 75 y.o. female who is a transfer from [facility] with an admission on 11/12/20 and discharge on 11/17/20. She has a PMH of DM2, CAD, hypothyroidism, DVT/PE, Htn, who underwent operative management of a left shouder glenohumeral degenerative joint disease with a hemiarthroplasty and biceps tenodesis. Postoperatively no issues other than some nausea which was remedied. She was given WBAT and tylenol/oxycodone for pain control. She was then discharged to the TCU.    Today will assess vitals, recent fall with XR, pain control and therapy.     Past Medical History:  Past Medical History:   Diagnosis Date     Abdominal adhesions      Acne rosacea      Allergic rhinitis      Alopecia      Anemia      CAD (coronary artery disease)      CKD (chronic kidney disease) stage 2, GFR 60-89 ml/min      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)      Depression      DM2 (diabetes mellitus, type 2) (H)      DVT (deep venous thrombosis) (H)      GERD (gastroesophageal reflux disease)      HTN (hypertension)      Hypertriglyceridemia      Hypokalemia      Hypothyroidism      ANNETTE on CPAP      Osteoarthritis of glenohumeral joint      Papillary carcinoma of thyroid (H)      Poliomyelitis      Pulmonary embolus (H)      Pulmonary nodule      Septic joint of right knee joint (H)      Venous insufficiency            Surgical History:  Past Surgical History:   Procedure Laterality Date     APPENDECTOMY       CARPAL TUNNEL RELEASE       CHOLECYSTECTOMY       COLOSTOMY       FOOT ARTHRODESIS Right     Right foot triple arthrodesis and removal of hardware     ROTATOR CUFF REPAIR       SHOULDER ARTHROSCOPY  06/25/2015     REVISION SUBACROMIAL DECOMPRESSION, EXCISION OF GANGLION CYST, DEBRIDEMENT AND EXCISION OF THE GLENOHUMERAL JOINT GANGLION CYST, CORACOID DECOMPRESSION, POSSIBLE SUBSCAPULARIS REPAIR AND OPEN SUBSCAPULARIS BICEP     SHOULDER SURGERY  11/12/2020    LEFT SHOULDER HEMIARHTROPLASTY, BICEP TENODESIS     TENDON RELEASE      foot     THYROIDECTOMY       TOE AMPUTATION       TOTAL ABDOMINAL HYSTERECTOMY         Family History:   No family history on file.    Social History:    Social History     Socioeconomic History     Marital status:      Spouse name: Not on file     Number of children: Not on file     Years of education: Not on file     Highest education level: Not on file   Occupational History     Not on file   Social Needs     Financial resource strain: Not on file     Food insecurity     Worry: Not on file     Inability: Not on file     Transportation needs     Medical: Not on file     Non-medical: Not on file   Tobacco Use     Smoking status: Never Smoker     Smokeless tobacco: Never Used   Substance and Sexual Activity     Alcohol use: Not Currently     Drug use: Not on file     Sexual activity: Not on file   Lifestyle     Physical activity     Days per week: Not on file     Minutes per session: Not on file     Stress: Not on file   Relationships     Social connections     Talks on phone: Not on file     Gets together: Not on file     Attends [employer] service: Not on file     Active member of club or organization: Not on file     Attends meetings of clubs or organizations: Not on file     Relationship status: Not on file     Intimate partner violence     Fear of current or ex partner: Not on file     Emotionally abused: Not on file     Physically abused: Not on file     Forced sexual activity: Not on file   Other Topics Concern     Not on file   Social History Narrative     Not on file          Review of Systems   Constitutional: Positive for activity change and fatigue. Negative for appetite change,  "chills and diaphoresis.   HENT: Negative for congestion and hearing loss.    Eyes: Negative.    Cardiovascular: Positive for leg swelling.   Gastrointestinal: Negative for abdominal distention, abdominal pain, blood in stool, constipation, diarrhea and nausea.   Endocrine: Negative.    Genitourinary: Negative for difficulty urinating.   Musculoskeletal:        L UE   Skin:        L shoulder   Allergic/Immunologic: Negative.    Neurological: Negative for dizziness, tremors, speech difficulty and light-headedness.   Hematological: Negative.    Psychiatric/Behavioral: Positive for sleep disturbance. Negative for agitation, confusion and hallucinations. The patient is not nervous/anxious.        Vitals:    12/01/20 0723   BP: 121/55   Pulse: 63   Resp: 16   Temp: (!) 96.4  F (35.8  C)   SpO2: 99%   Weight: 181 lb 4.8 oz (82.2 kg)   Height: 4' 10.5\" (1.486 m)       Physical Exam  Vitals signs reviewed.   Constitutional:       Appearance: She is obese.   HENT:      Head: Normocephalic.      Right Ear: External ear normal.      Left Ear: External ear normal.      Nose: No congestion.      Mouth/Throat:      Pharynx: No oropharyngeal exudate.   Eyes:      General:         Right eye: No discharge.         Left eye: No discharge.   Neck:      Musculoskeletal: Normal range of motion.   Cardiovascular:      Rate and Rhythm: Normal rate.      Pulses: Normal pulses.      Heart sounds: No murmur.   Pulmonary:      Effort: Pulmonary effort is normal. No respiratory distress.      Comments: RA, CTA  Abdominal:      General: There is no distension.      Palpations: Abdomen is soft.      Tenderness: There is no abdominal tenderness. There is no guarding.      Comments: Denies constipation or diarrhea, colostomy patent   Genitourinary:     Comments: No issues  Musculoskeletal:      Right lower leg: Edema present.      Left lower leg: Edema present.      Comments: Non pitting dependent BLE, has R AFO, pain in L UE. WBAT, wearing sling. " Recent fall   Skin:     General: Skin is warm.      Comments: L shoulder incision intact   Neurological:      General: No focal deficit present.      Mental Status: She is oriented to person, place, and time.   Psychiatric:         Mood and Affect: Mood normal.         Behavior: Behavior normal.         Medication List:  Current Outpatient Medications   Medication Sig     acetaminophen (TYLENOL) 500 MG tablet Take 1,000 mg by mouth 4 (four) times a day.      amLODIPine (NORVASC) 2.5 MG tablet Take 2.5 mg by mouth 2 (two) times a day.     aspirin 81 MG EC tablet Take 81 mg by mouth daily.     atenoloL (TENORMIN) 50 MG tablet Take 50 mg by mouth 2 (two) times a day.     azelastine (ASTEPRO) 0.15 % (205.5 mcg) Spry nasal spray Apply 1 spray into each nostril at bedtime.     biotin 1 mg tablet Take 1,000 mcg by mouth daily.     cholecalciferol, vitamin D3, 5,000 unit Tab Take 5,000 Units by mouth daily.     clotrimazole (LOTRIMIN) 1 % cream Apply 1 application topically daily as needed.     famotidine (PEPCID) 40 MG tablet Take 40 mg by mouth daily as needed for heartburn.     fenofibrate (TRICOR) 145 MG tablet Take 145 mg by mouth daily.     ferrous sulfate 325 (65 FE) MG tablet Take 1 tablet by mouth daily with breakfast.     fexofenadine (ALLEGRA) 180 MG tablet Take 180 mg by mouth daily.     fluticasone propionate (FLONASE) 50 mcg/actuation nasal spray Apply 1 spray into each nostril daily.     furosemide (LASIX) 20 MG tablet Take 20 mg by mouth daily as needed.     gabapentin (NEURONTIN) 100 MG capsule Take 100 mg by mouth every evening.     hydrOXYzine pamoate (VISTARIL) 25 MG capsule Take 25 mg by mouth 3 (three) times a day. Give with tylenol     insulin glargine U-300 conc 300 unit/mL (3 mL) InPn Inject 5 Units under the skin daily before breakfast.     levothyroxine (SYNTHROID, LEVOTHROID) 112 MCG tablet Take 112 mcg by mouth daily. Except for Fridays. And 56 mcg on Friday.     lidocaine 4 % patch Place 1  patch on the skin daily. Remove and discard patch with 12 hours. Apply to R knee     lisinopriL (PRINIVIL,ZESTRIL) 40 MG tablet Take 40 mg by mouth daily.     melatonin 3 mg Tab tablet Take 6 mg by mouth at bedtime.     mometasone (NASONEX) 50 mcg/actuation nasal spray 1 spray into each nostril 2 (two) times a day.     multivitamin (MULTIPLE VITAMINS ORAL) Take 1 tablet by mouth daily.     nitroglycerin (NITROSTAT) 0.4 MG SL tablet Place 0.4 mg under the tongue every 5 (five) minutes as needed for chest pain.     nystatin (MYCOSTATIN) cream Apply 1 application topically 2 (two) times a day as needed for dry skin.     nystatin (MYCOSTATIN) powder Apply 1 application topically daily. every Thu, Sun for use with colostomy after bath     nystatin-triamcinolone (MYCOLOG II) cream Apply 1 application topically daily as needed.     ondansetron (ZOFRAN) 4 MG tablet Take 4 mg by mouth every 8 (eight) hours as needed for nausea.     oxyCODONE (ROXICODONE) 5 MG immediate release tablet Take 1 tablet (5 mg total) by mouth every 4 (four) hours as needed for pain.     pantoprazole (PROTONIX) 40 MG tablet Take 40 mg by mouth 2 (two) times a day.     polyethylene glycol (MIRALAX) 17 gram packet Take 17 g by mouth daily as needed.     polyethylene glycol 400 (BLINK TEARS) 0.25 % Drop Apply 1 drop to eye 4 (four) times a day as needed.     senna-docusate (SENNOSIDES-DOCUSATE SODIUM) 8.6-50 mg tablet Take 1 tablet by mouth 2 (two) times a day as needed for constipation.     SITagliptin (JANUVIA) 50 MG tablet Take 50 mg by mouth daily.     sucralfate (CARAFATE) 100 mg/mL suspension Take 1 g by mouth 2 (two) times a day as needed.       Labs:  Results for orders placed or performed in visit on 11/30/20   Basic Metabolic Panel   Result Value Ref Range    Sodium 136 136 - 145 mmol/L    Potassium 4.7 3.5 - 5.0 mmol/L    Chloride 105 98 - 107 mmol/L    CO2 26 22 - 31 mmol/L    Anion Gap, Calculation 5 5 - 18 mmol/L    Glucose 101 70 - 125  mg/dL    Calcium 8.7 8.5 - 10.5 mg/dL    BUN 34 (H) 8 - 28 mg/dL    Creatinine 1.03 0.60 - 1.10 mg/dL    GFR MDRD Af Amer >60 >60 mL/min/1.73m2    GFR MDRD Non Af Amer 52 (L) >60 mL/min/1.73m2     Lab Results   Component Value Date    WBC 8.0 11/27/2020    HGB 9.2 (L) 11/27/2020    HCT 28.4 (L) 11/27/2020    MCV 94 11/27/2020     11/27/2020     Vitamin D, Total (25-Hydroxy)   Date Value Ref Range Status   11/27/2020 58.0 30.0 - 80.0 ng/mL Final       Lab Results   Component Value Date    TSH 14.89 (H) 11/27/2020       Lab Results   Component Value Date    HGBA1C 6.2 (H) 11/27/2020       Vitamin B-12   Date Value Ref Range Status   11/27/2020 860 (H) 213 - 816 pg/mL Final         Assessment/Plan:    Left shouder glenohumeral degenerative joint disease with a hemiarthroplasty and biceps tenodesis: WBAT, f/u with ortho as ordered. Per recent fall, ortho determining if clinic visit would be appropriate per XR    Pain control: continue tylenol 1000mg four times a day and oxycodone 5mg q4h PRN (2x daily). Continue vistaril 25mg three times a day, encourage icing. Pain improved    Acute hyperkalemia: last K 4.7 on 11/30/20, given kayexalate prior. Recheck BMP on 12/8    R knee pain: continue lidocaine patch    CAD: continue ASA 81mg daily, statin and fenofibrate     HTN: continue lisinopril 40mg daily, amlodipine 2.5mg daily and atenolol 50mg two times a day. SBP <130    Allergies: continue fexofenadine and flonase daily. Advised to change allegra to PRN though reluctulant currently     Hypothyroidism: continue synthroid 112mcg daily and 56mcg on fridays, recheck 14.89, f/u with PCP    DM2: continue U-300 5U daily, Januvia 50mg daily. Most BS <200    Neuropathy: continue gabapentin 100mg at HS    GERD: continue protonix 40mg two times a day, sucralfate 10mg two times a day PRN and pepcid 40mg daily PRN. Also has zofran PRN    Abdominal pain: Abx negative, resolved. Possibly d/t GERD    Anemia: continue FeSO4 325mg  daily, last Hgb 9.2 on 11/27/20, recheck CBC on 12/2    Vit D def: continue D3 5000U daily, recheck 58.0    Insomnia: continue melatonin 6mg at HS, discontinued tylenol PM, effective    Morbid obesity: last BMI 37.2    Constipation: continue senna S 1 tab two times a day and miralax daily PRN    Labs: TSH 14.89, vit D B12 860, A1c 6.2    Disposition: lives with son who has cerebral palsy. SLUMS 23/30      Electronically signed by: Leonides Acuna NP

## 2021-06-13 NOTE — PROGRESS NOTES
[facility]      Facility:    [facility] [[id number]]  Code Status: FULL CODE      Chief Complaint/Reason for Visit:   Chief Complaint   Patient presents with     Discharge Summary       HPI:   Chasity is a 75 y.o. female who is a transfer from [facility] with an admission on 11/12/20 and discharge on 11/17/20. She has a PMH of DM2, CAD, hypothyroidism, DVT/PE, Htn, who underwent operative management of a left shouder glenohumeral degenerative joint disease with a hemiarthroplasty and biceps tenodesis. Postoperatively no issues other than some nausea which was remedied. She was given WBAT and tylenol/oxycodone for pain control. She was then discharged to the TCU.    She has concluded her TCU stay and will be discharged to home with services on 12/12/20.    Past Medical History:  Past Medical History:   Diagnosis Date     Abdominal adhesions      Acne rosacea      Allergic rhinitis      Alopecia      Anemia      CAD (coronary artery disease)      CKD (chronic kidney disease) stage 2, GFR 60-89 ml/min      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)      Depression      DM2 (diabetes mellitus, type 2) (H)      DVT (deep venous thrombosis) (H)      GERD (gastroesophageal reflux disease)      HTN (hypertension)      Hypertriglyceridemia      Hypokalemia      Hypothyroidism      ANNETTE on CPAP      Osteoarthritis of glenohumeral joint      Papillary carcinoma of thyroid (H)      Poliomyelitis      Pulmonary embolus (H)      Pulmonary nodule      Septic joint of right knee joint (H)      Venous insufficiency            Surgical History:  Past Surgical History:   Procedure Laterality Date     APPENDECTOMY       CARPAL TUNNEL RELEASE       CHOLECYSTECTOMY       COLOSTOMY       FOOT ARTHRODESIS Right     Right foot triple arthrodesis and removal of hardware     ROTATOR CUFF REPAIR       SHOULDER ARTHROSCOPY  06/25/2015    REVISION SUBACROMIAL DECOMPRESSION, EXCISION OF GANGLION CYST,  DEBRIDEMENT AND EXCISION OF THE GLENOHUMERAL JOINT GANGLION CYST, CORACOID DECOMPRESSION, POSSIBLE SUBSCAPULARIS REPAIR AND OPEN SUBSCAPULARIS BICEP     SHOULDER SURGERY  11/12/2020    LEFT SHOULDER HEMIARHTROPLASTY, BICEP TENODESIS     TENDON RELEASE      foot     THYROIDECTOMY       TOE AMPUTATION       TOTAL ABDOMINAL HYSTERECTOMY         Family History:   No family history on file.    Social History:    Social History     Socioeconomic History     Marital status:      Spouse name: Not on file     Number of children: Not on file     Years of education: Not on file     Highest education level: Not on file   Occupational History     Not on file   Social Needs     Financial resource strain: Not on file     Food insecurity     Worry: Not on file     Inability: Not on file     Transportation needs     Medical: Not on file     Non-medical: Not on file   Tobacco Use     Smoking status: Never Smoker     Smokeless tobacco: Never Used   Substance and Sexual Activity     Alcohol use: Not Currently     Drug use: Not on file     Sexual activity: Not on file   Lifestyle     Physical activity     Days per week: Not on file     Minutes per session: Not on file     Stress: Not on file   Relationships     Social connections     Talks on phone: Not on file     Gets together: Not on file     Attends Orthodox service: Not on file     Active member of club or organization: Not on file     Attends meetings of clubs or organizations: Not on file     Relationship status: Not on file     Intimate partner violence     Fear of current or ex partner: Not on file     Emotionally abused: Not on file     Physically abused: Not on file     Forced sexual activity: Not on file   Other Topics Concern     Not on file   Social History Narrative     Not on file          Review of Systems   Constitutional: Positive for activity change and fatigue. Negative for appetite change, chills and diaphoresis.        Still has pain in L UE   HENT:  Negative for congestion and hearing loss.    Eyes: Negative.    Cardiovascular: Positive for leg swelling.   Gastrointestinal: Negative for abdominal distention, abdominal pain, blood in stool, constipation, diarrhea and nausea.   Endocrine: Negative.    Genitourinary: Negative for difficulty urinating.   Musculoskeletal:        L UE   Skin:        L shoulder   Allergic/Immunologic: Negative.    Neurological: Negative for dizziness, tremors, speech difficulty and light-headedness.   Hematological: Negative.    Psychiatric/Behavioral: Negative for agitation, confusion, hallucinations and sleep disturbance. The patient is not nervous/anxious.        Vitals:    12/10/20 0753   BP: 119/47   Pulse: 60   Resp: 16   Temp: 97  F (36.1  C)   SpO2: 98%   Weight: 180 lb (81.6 kg)       Physical Exam  Vitals signs reviewed.   Constitutional:       Appearance: She is obese.   HENT:      Head: Normocephalic.      Right Ear: External ear normal.      Left Ear: External ear normal.      Nose: No congestion.      Mouth/Throat:      Pharynx: No oropharyngeal exudate.   Eyes:      General:         Right eye: No discharge.         Left eye: No discharge.   Neck:      Musculoskeletal: Normal range of motion.   Cardiovascular:      Rate and Rhythm: Normal rate.      Pulses: Normal pulses.      Heart sounds: No murmur.   Pulmonary:      Effort: Pulmonary effort is normal. No respiratory distress.      Comments: RA, CTA  Abdominal:      General: There is no distension.      Palpations: Abdomen is soft.      Tenderness: There is no abdominal tenderness. There is no guarding.      Comments: Denies constipation or diarrhea, colostomy patent   Genitourinary:     Comments: No issues  Musculoskeletal:      Right lower leg: Edema present.      Left lower leg: Edema present.      Comments: Non pitting dependent BLE, has R AFO, pain in L UE persists. WBAT, wearing sling. Recent fall   Skin:     General: Skin is warm.      Comments: L shoulder  incision intact   Neurological:      General: No focal deficit present.      Mental Status: She is oriented to person, place, and time.   Psychiatric:         Mood and Affect: Mood normal.         Behavior: Behavior normal.         Medication List:  Current Outpatient Medications   Medication Sig     acetaminophen (TYLENOL) 500 MG tablet Take 1,000 mg by mouth 4 (four) times a day.      amLODIPine (NORVASC) 2.5 MG tablet Take 2.5 mg by mouth 2 (two) times a day.     aspirin 81 MG EC tablet Take 81 mg by mouth daily.     atenoloL (TENORMIN) 50 MG tablet Take 25 mg by mouth 2 (two) times a day.      azelastine (ASTEPRO) 0.15 % (205.5 mcg) Spry nasal spray Apply 1 spray into each nostril at bedtime.     biotin 1 mg tablet Take 1,000 mcg by mouth daily.     cholecalciferol, vitamin D3, 5,000 unit Tab Take 5,000 Units by mouth daily.     clotrimazole (LOTRIMIN) 1 % cream Apply 1 application topically daily as needed.     famotidine (PEPCID) 40 MG tablet Take 40 mg by mouth daily as needed for heartburn.     fenofibrate (TRICOR) 145 MG tablet Take 145 mg by mouth daily.     fexofenadine (ALLEGRA) 180 MG tablet Take 180 mg by mouth daily.     fluticasone propionate (FLONASE) 50 mcg/actuation nasal spray Apply 1 spray into each nostril daily.     furosemide (LASIX) 20 MG tablet Take 20 mg by mouth daily as needed.     gabapentin (NEURONTIN) 100 MG capsule Take 100 mg by mouth every evening.     hydrOXYzine pamoate (VISTARIL) 25 MG capsule Take 25 mg by mouth 3 (three) times a day. Give with tylenol     insulin glargine U-300 conc 300 unit/mL (3 mL) InPn Inject 5 Units under the skin daily before breakfast.     levothyroxine (SYNTHROID, LEVOTHROID) 112 MCG tablet Take 112 mcg by mouth daily. Except for Fridays. And 56 mcg on Friday.     lidocaine 4 % patch Place 1 patch on the skin daily. Remove and discard patch with 12 hours. Apply to R knee     lisinopriL (PRINIVIL,ZESTRIL) 40 MG tablet Take 40 mg by mouth daily.      melatonin 3 mg Tab tablet Take 6 mg by mouth at bedtime.     mometasone (NASONEX) 50 mcg/actuation nasal spray 1 spray into each nostril 2 (two) times a day.     multivitamin (MULTIPLE VITAMINS ORAL) Take 1 tablet by mouth daily.     nitroglycerin (NITROSTAT) 0.4 MG SL tablet Place 0.4 mg under the tongue every 5 (five) minutes as needed for chest pain.     nystatin (MYCOSTATIN) cream Apply 1 application topically 2 (two) times a day as needed for dry skin.     nystatin (MYCOSTATIN) powder Apply 1 application topically daily. every Thu, Sun for use with colostomy after bath     nystatin-triamcinolone (MYCOLOG II) cream Apply 1 application topically daily as needed.     ondansetron (ZOFRAN) 4 MG tablet Take 4 mg by mouth every 8 (eight) hours as needed for nausea.     oxyCODONE (ROXICODONE) 5 MG immediate release tablet Take 1 tablet (5 mg total) by mouth every 4 (four) hours as needed for pain.     pantoprazole (PROTONIX) 40 MG tablet Take 40 mg by mouth 2 (two) times a day.     polyethylene glycol (MIRALAX) 17 gram packet Take 17 g by mouth daily as needed.     polyethylene glycol 400 (BLINK TEARS) 0.25 % Drop Apply 1 drop to eye 4 (four) times a day as needed.     senna-docusate (SENNOSIDES-DOCUSATE SODIUM) 8.6-50 mg tablet Take 1 tablet by mouth 2 (two) times a day as needed for constipation.     SITagliptin (JANUVIA) 50 MG tablet Take 50 mg by mouth daily.     sucralfate (CARAFATE) 100 mg/mL suspension Take 1 g by mouth 2 (two) times a day as needed.       Labs:  Results for orders placed or performed in visit on 11/30/20   Basic Metabolic Panel   Result Value Ref Range    Sodium 136 136 - 145 mmol/L    Potassium 4.7 3.5 - 5.0 mmol/L    Chloride 105 98 - 107 mmol/L    CO2 26 22 - 31 mmol/L    Anion Gap, Calculation 5 5 - 18 mmol/L    Glucose 101 70 - 125 mg/dL    Calcium 8.7 8.5 - 10.5 mg/dL    BUN 34 (H) 8 - 28 mg/dL    Creatinine 1.03 0.60 - 1.10 mg/dL    GFR MDRD Af Amer >60 >60 mL/min/1.73m2    GFR MDRD Non  Af Amer 52 (L) >60 mL/min/1.73m2     Lab Results   Component Value Date    WBC 6.6 12/02/2020    HGB 9.1 (L) 12/02/2020    HCT 27.5 (L) 12/02/2020    MCV 94 12/02/2020     12/02/2020     Vitamin D, Total (25-Hydroxy)   Date Value Ref Range Status   11/27/2020 58.0 30.0 - 80.0 ng/mL Final       Lab Results   Component Value Date    TSH 14.89 (H) 11/27/2020       Lab Results   Component Value Date    HGBA1C 6.2 (H) 11/27/2020       Vitamin B-12   Date Value Ref Range Status   11/27/2020 860 (H) 213 - 816 pg/mL Final         Assessment/Plan:    Left shouder glenohumeral degenerative joint disease with a hemiarthroplasty and biceps tenodesis: WBAT, f/u with ortho as ordered. Per recent fall and XR requirements, ortho will f/u with her on 12/16    Pain control: continue tylenol 1000mg four times a day and oxycodone 5mg q4h PRN (usually 3x daily). Continue vistaril 25mg three times a day, encourage icing. Pain improved    Acute hyperkalemia: last K 4.7 on 11/30/20, given kayexalate prior. Recheck BMP on 12/10    R knee pain: continue lidocaine patch    CAD: continue ASA 81mg daily, statin and fenofibrate     HTN: continue lisinopril 40mg daily, amlodipine 2.5mg BID and per persistent HR <60 will decrease atenolol to 25mg two times a day. Now HR 70s    Allergies: continue fexofenadine and flonase daily. Advised to change allegra to PRN though reluctulant currently     Hypothyroidism: continue synthroid 112mcg daily and 56mcg on fridays, last TSH 14.89, recheck TSH/FT4 on 12/10    DM2: continue U-300 5U daily, Januvia 50mg daily. Most BS <200    Neuropathy: continue gabapentin 100mg at HS    GERD: continue protonix 40mg two times a day, sucralfate 10mg two times a day PRN and pepcid 40mg daily PRN. Also has zofran PRN    Abdominal pain: Abx negative, resolved. Possibly d/t GERD    Anemia: last Hgb 9.1 on 12/2/20, patient requested FeSO4 to be discontinued, f/u with PCP    Vit D def: continue D3 5000U daily, recheck  58.0    Insomnia: continue melatonin 6mg at HS, discontinued tylenol PM, effective    Morbid obesity: last BMI 36.1    Constipation: continue senna S 1 tab two times a day and miralax daily PRN    Labs: TSH 14.89, vit D B12 860, A1c 6.2    Disposition: lives with son who has cerebral palsy. SLUMS 23/30    MEDICAL EQUIPMENT NEEDS:  na    DISCHARGE PLAN/FACE TO FACE:  I certify that services are/were furnished while this patient was under the care of a physician and that a physician or an allowed non-physician practitioner (NPP), had a face-to-face encounter that meets the physician face-to-face encounter requirements. The encounter was in whole, or in part, related to the primary reason for home health. The patient is confined to his/her home and needs intermittent skilled nursing, physical therapy, speech-language pathology, or the continued need for occupational therapy. A plan of care has been established by a physician and is periodically reviewed by a physician.  Date of Face-to-Face Encounter: 12/10/20    I certify that, based on my findings, the following services are medically necessary home health services: [facility] HHA/RN and PT/OT to evaluate and treat    My clinical findings support the need for the above skilled services because: patient will be discharging to home. Patient will need assistance with medication management and performing IADLs and ADLs effectively and safely    Patient to re-establish plan of care with their PCP within 7 days after leaving TCU.     The care plan has been reviewed and all orders signed. Changes to care plan, if any, as noted. Otherwise, continue care plan of care.  The total time spent with this patient was 31 minutes, with greater than 50% spent in counseling and coordination of care that included multiple issues per f/u with ortho, pain control, continuing therapy and discharge, which lasted 16 minutes.      Electronically signed by: Leonides Acuna NP

## 2021-06-14 ENCOUNTER — MYC MEDICAL ADVICE (OUTPATIENT)
Dept: FAMILY MEDICINE | Facility: CLINIC | Age: 76
End: 2021-06-14

## 2021-06-14 NOTE — TELEPHONE ENCOUNTER
Spoke with patient     Does not recall injuring leg     Today is a little puffy     1-2 months ago, looked like a sunburn and peeling. Started looking like that and some of the skin if you rub it or put clothes on skin would peel off    Skin peels then just weeping clear/yellow and keeps doing that    Trying to dab with a tissue or wearing anklets     Recommended OV, pt agreed to visit this week with team. Will call back if any new or worsening before then    Next 5 appointments (look out 90 days)    Jun 17, 2021  4:00 PM  Office Visit with Maude Ortiz PA-C  [facility] ([facility] ) [address] [facility], [address]  [facility] [id number] [phone]        Scheduled future visit     Floresita ROSE RN

## 2021-06-16 ASSESSMENT — ENCOUNTER SYMPTOMS
ABDOMINAL PAIN: 1
STIFFNESS: 0
WEIGHT GAIN: 1
POLYDIPSIA: 0
MUSCLE CRAMPS: 1
SLEEP DISTURBANCES DUE TO BREATHING: 0
WEIGHT LOSS: 0
NAUSEA: 1
EYE REDNESS: 0
POOR WOUND HEALING: 1
NAIL CHANGES: 1
FLANK PAIN: 1
SINUS PAIN: 0
VOMITING: 1
DECREASED APPETITE: 0
DIFFICULTY URINATING: 0
EYE IRRITATION: 1
EXERCISE INTOLERANCE: 0
HYPERTENSION: 1
ALTERED TEMPERATURE REGULATION: 1
JAUNDICE: 0
BLOOD IN STOOL: 0
EYE PAIN: 0
NIGHT SWEATS: 1
FEVER: 0
TASTE DISTURBANCE: 0
EYE WATERING: 0
PALPITATIONS: 0
SKIN CHANGES: 0
SORE THROAT: 0
SYNCOPE: 0
RECTAL PAIN: 1
LIGHT-HEADEDNESS: 0
DIARRHEA: 0
MUSCLE WEAKNESS: 1
DOUBLE VISION: 0
DYSURIA: 0
HEARTBURN: 1
HEMATURIA: 0
LEG PAIN: 0
JOINT SWELLING: 0
NECK MASS: 0
BLOATING: 1
CONSTIPATION: 0
BACK PAIN: 0
FATIGUE: 1
TROUBLE SWALLOWING: 0
INCREASED ENERGY: 0
HYPOTENSION: 0
NECK PAIN: 0
HALLUCINATIONS: 0
ARTHRALGIAS: 1
POLYPHAGIA: 0
HOARSE VOICE: 0
MYALGIAS: 1
SMELL DISTURBANCE: 0
ORTHOPNEA: 0
SINUS CONGESTION: 0
CHILLS: 1
BOWEL INCONTINENCE: 0

## 2021-06-17 ENCOUNTER — MYC MEDICAL ADVICE (OUTPATIENT)
Dept: ANESTHESIOLOGY | Facility: CLINIC | Age: 76
End: 2021-06-17

## 2021-06-17 ENCOUNTER — OFFICE VISIT (OUTPATIENT)
Dept: ANESTHESIOLOGY | Facility: CLINIC | Age: 76
End: 2021-06-17
Payer: MEDICARE

## 2021-06-17 ENCOUNTER — OFFICE VISIT (OUTPATIENT)
Dept: FAMILY MEDICINE | Facility: CLINIC | Age: 76
End: 2021-06-17
Payer: MEDICARE

## 2021-06-17 VITALS
BODY MASS INDEX: 38.77 KG/M2 | TEMPERATURE: 97.8 F | HEART RATE: 63 BPM | HEIGHT: 60 IN | DIASTOLIC BLOOD PRESSURE: 70 MMHG | WEIGHT: 197.5 LBS | OXYGEN SATURATION: 100 % | SYSTOLIC BLOOD PRESSURE: 164 MMHG

## 2021-06-17 VITALS — HEIGHT: 60 IN | RESPIRATION RATE: 16 BRPM | WEIGHT: 192 LBS | BODY MASS INDEX: 37.69 KG/M2

## 2021-06-17 DIAGNOSIS — Z79.4 TYPE 2 DIABETES MELLITUS WITH OTHER SPECIFIED COMPLICATION, WITH LONG-TERM CURRENT USE OF INSULIN (H): ICD-10-CM

## 2021-06-17 DIAGNOSIS — R60.0 EDEMA OF LEFT LOWER LEG: Primary | ICD-10-CM

## 2021-06-17 DIAGNOSIS — A80.9 POLIOMYELITIS: ICD-10-CM

## 2021-06-17 DIAGNOSIS — L03.116 LEFT LEG CELLULITIS: ICD-10-CM

## 2021-06-17 DIAGNOSIS — E11.69 TYPE 2 DIABETES MELLITUS WITH OTHER SPECIFIED COMPLICATION, WITH LONG-TERM CURRENT USE OF INSULIN (H): ICD-10-CM

## 2021-06-17 DIAGNOSIS — M79.7 FIBROMYALGIA: ICD-10-CM

## 2021-06-17 PROCEDURE — 99214 OFFICE O/P EST MOD 30 MIN: CPT | Performed by: PHYSICIAN ASSISTANT

## 2021-06-17 PROCEDURE — 99203 OFFICE O/P NEW LOW 30 MIN: CPT | Performed by: ANESTHESIOLOGY

## 2021-06-17 RX ORDER — CEPHALEXIN 500 MG/1
500 CAPSULE ORAL 2 TIMES DAILY
Qty: 40 CAPSULE | Refills: 0 | Status: SHIPPED | OUTPATIENT
Start: 2021-06-17 | End: 2021-06-18

## 2021-06-17 ASSESSMENT — ENCOUNTER SYMPTOMS
ORTHOPNEA: 0
CONSTIPATION: 0
EYE REDNESS: 0
HEMATURIA: 0
ABDOMINAL PAIN: 1
HALLUCINATIONS: 0
EYE PAIN: 0
BACK PAIN: 0
PALPITATIONS: 0
DIARRHEA: 0
MYALGIAS: 1
NAUSEA: 1
BLOOD IN STOOL: 0
POLYDIPSIA: 0
SORE THROAT: 0
WEIGHT LOSS: 0
FLANK PAIN: 1
HEARTBURN: 1
DOUBLE VISION: 0
SINUS PAIN: 0
DYSURIA: 0
FEVER: 0
CHILLS: 1
NECK PAIN: 0
VOMITING: 1

## 2021-06-17 ASSESSMENT — MIFFLIN-ST. JEOR
SCORE: 1292.41
SCORE: 1317.35

## 2021-06-17 ASSESSMENT — PAIN SCALES - GENERAL: PAINLEVEL: SEVERE PAIN (6)

## 2021-06-17 NOTE — LETTER
6/17/2021       RE: Chasity Hodgson  [id number]  [facility] [id number]     Dear Colleague,    Thank you for referring your patient, Chasity Hodgson, to the [facility] FOR COMPREHENSIVE PAIN MANAGEMENT [place] at [facility]. Please see a copy of my visit note below.      Pain Clinic New Patient Consult Note:    Referring Provider: Kaycee   Primary care provider: Shola Benoit.    Chasity Hodgson is a 75 year old y.o. old female with a PMHx significant for polio and chronic RLE paralysis and foot drop as well as shoulder OA and pain due to work injury in 2000 with ongoing pain in the shoulder who presents to the pain clinic with shoulder and biceps pain with limited ROM after surgery.  She states that she has had shoulder replacement ont eh right in 2018 and then in 2020 they did the left shoulder and both biceps have been decompressed.  She is working with phsyical therapy currently which is helping but she is having trouple reaching overhead from the elbow up that causes pain.  She describes this pain as a bad achying type pain with occasional sharp pain that occurs with certain movements where she will have sharp pain from the elbow up to the shoulder.  She has a very small amount of neck pain lateral by the top of the shoulder over the trapezius.  She uses ice and tylenol which helps.  Ice feels nice but not sure if it helps.  She denies any numbness or tingling, numbness.  She states she still has some weakness for overhead activity but with the pain she is limited in tolerating this.  She is working on her HEP for shoulder ROM which is tougher to manage at home.  She otherwise denies any weakness.  She has chronic weakness of the RLE due to polio when she was 3 which left the right leg paralyzes and now has chronic foot drop.      HPI:  Patient Supplied Answers To the [facility] Pain Questionnaire  [facility] -  Patient Entered Questionnaire/Answers  6/16/2021   What number best describes your pain right now:  0 = No pain  to  10 = Worst pain imaginable 7   How would you describe the pain? burning, sharp, dull, aching, throbbing, other   Which of the following worsen your pain? standing, sitting, exercise   Which of the following improve or reduce your pain?  medication, relaxation   What number best describes your average pain for the past week:  0 = No pain  to  10 = Worst pain imaginable 7   What number best describes your LOWEST pain in past 24 hours:  0 = No pain  to  10 = Worst pain imaginable 3   What number best describes your WORST pain in past 24 hours:  0 = No pain  to  10 = Worst pain imaginable 9   When is your pain worst? AM, PM   What non-medicine treatments have you already had for your pain? physical therapy, surgery, exercise, other   Have you tried treating your pain with medication?  Yes   Are you currently taking medications for your pain? Yes     Pain treatments:  Herbal medicines: biotin, CoQ10, Caltrate  Physical therapy: Physical therapy was last done 3 weeks ago and she was seeing Alfredo at [facility] and they wanted more but she needed to see us first as this is workman's comp case.    Chiropractor: not yet but would consider  Pain physician: no  Surgery: bilateral should surgeries   Biofeedback: none   Acupuncture: not yet but wanting this as treatment     Tests/Imaging reviewed with the patient:  None     Significant Medical History:   Past Medical History:   Diagnosis Date     Abdominal adhesions 1984, 96,99    s/p lysis     Allergic rhinitis, cause unspecified      Carotid stenosis      CPAP (continuous positive airway pressure) dependence      Diabetic gastroparesis (H)      Diet-controlled type 2 diabetes mellitus (H)      DVT of axillary vein, acute right (H)      Fibromyalgia      Gastro-oesophageal reflux disease      Hernia of unspecified site of abdominal cavity without mention of obstruction or gangrene     bilateral      HTN (hypertension)      Hypertriglyceridemia      Obstructive sleep apnea      ANNETTE (obstructive sleep apnea)      Papillary carcinoma of thyroid (H)     s/p thyroidectomy - Ruegemer     PE (pulmonary embolism)      Poliomyelitis     poor circulation right leg     Postsurgical hypothyroidism     s/p papillary thryoid cancer - Ruegemer     Pulmonary embolism (H)      Rosacea      S/P carpal tunnel release     bilateral     S/P hardware removal 01/2014    still with lingering foot pain     S/P shoulder surgery     bilateral     Septic joint (H)     right knee     Venous insufficiency      Venous thrombosis 1999    right axillary vein          Past Surgical History:  Past Surgical History:   Procedure Laterality Date     AMPUTATE TOE(S)  3/15/2012    Procedure:AMPUTATE TOE(S); Surgeon:RUBEN METZ; Location: OR     APPENDECTOMY  1972     ARTHRODESIS FOOT  3/15/2012    Procedure:ARTHRODESIS FOOT; RIGHT TRIPLE ARTHRODESIS, FIFTH TOE AMPUTATION, LATERAL LIGAMENT RECONSTRUCTION, TENDON TRANSFER AND RELEASE [MINI C-ARM, ARTHREX 4.5 AND 6.7 STAPLES, BIOCOMPOSITE TENODESIS SCREWS]; Surgeon:RUBEN METZ; Location: OR      FREEING BOWEL ADHESION,ENTEROLYSIS      1986, 1996, 1999     C TOTAL ABDOM HYSTERECTOMY  1980    + BSO     CHOLECYSTECTOMY       COLOSTOMY  2/7/2012    Procedure:COLOSTOMY; CREATION OF SIGMOID COLOSTOMY AND EXTENSIVE  LYSIS OF ADHESIONS; Surgeon:MONTSERRAT BENDER P; Location: OR     GI SURGERY      weakened rectal sphincter with artificial stimulator     LAPAROTOMY, LYSIS ADHESIONS, COMBINED  2/7/2012    Procedure:COMBINED LAPAROTOMY, LYSIS ADHESIONS; Surgeon:MONTSERRAT BENDER P; Location: OR     RELEASE TENDON FOOT  3/15/2012    Procedure:RELEASE TENDON FOOT; Surgeon:RUBEN METZ; Location: OR     REMOVE HARDWARE FOOT  12/13/2012    Procedure: REMOVE HARDWARE FOOT;  RIGHT FOOT REMOVAL OF HARDWARE;  Surgeon: Ruben Metz MD;  Location:  OR     [facility]  NONSPECIFIC PROCEDURE      throidectomy          Family History:  Family History   Problem Relation Age of Onset     Arthritis Mother      Hypertension Mother      Cerebrovascular Disease Mother      Obesity Mother      Heart Disease Mother         MI's     Hypertension Father      Respiratory Father         Adult RDS     Diabetes Father         adult     Arthritis Sister      Cancer Sister      Arthritis Sister      Hypertension Sister      Cancer Sister         colon polup     Heart Disease Brother         MI at 54     Hypertension Sister      Lipids Brother      Hypertension Brother      Lipids Sister      Obesity Sister      Obesity Maternal Grandmother      Skin Cancer Maternal Grandmother         skin cancer unknown     Cancer Maternal Grandmother         unknown skin cancer on face          Social History:  Social History     Socioeconomic History     Marital status:      Spouse name: Not on file     Number of children: 2     Years of education: Not on file     Highest education level: Not on file   Occupational History     Not on file   Social Needs     Financial resource strain: Not on file     Food insecurity     Worry: Not on file     Inability: Not on file     Transportation needs     Medical: Not on file     Non-medical: Not on file   Tobacco Use     Smoking status: Never Smoker     Smokeless tobacco: Never Used   Substance and Sexual Activity     Alcohol use: Not Currently     Alcohol/week: 0.0 standard drinks     Drug use: No     Sexual activity: Not Currently     Partners: Male     Birth control/protection: Female Surgical   Lifestyle     Physical activity     Days per week: Not on file     Minutes per session: Not on file     Stress: Not on file   Relationships     Social connections     Talks on phone: Not on file     Gets together: Not on file     Attends Islam service: Not on file     Active member of club or organization: Not on file     Attends meetings of clubs or organizations: Not  on file     Relationship status: Not on file     Intimate partner violence     Fear of current or ex partner: No     Emotionally abused: No     Physically abused: No     Forced sexual activity: No   Other Topics Concern     Parent/sibling w/ CABG, MI or angioplasty before 65F 55M? Not Asked      Service Not Asked     Blood Transfusions Not Asked     Caffeine Concern Yes     Comment: occ     Occupational Exposure Not Asked     Hobby Hazards Not Asked     Sleep Concern Yes     Stress Concern Yes     Weight Concern Not Asked     Special Diet Yes     Comment: low carb, low sugar      Back Care Not Asked     Exercise No     Comment: occ. stationary bike      Bike Helmet Not Asked     Seat Belt Yes     Self-Exams Not Asked   Social History Narrative    , 2 children    Retired in medical records at [employer]      Social History     Social History Narrative    , 2 children    Retired in medical records at [employer]           Allergies:  Allergies   Allergen Reactions     Nsaids Difficulty breathing     Increased creatinine     Toradol Difficulty breathing     Shortness of breath     Celecoxib Itching and Rash     Codeine Itching     With higher doses     Crestor [Rosuvastatin] Muscle Pain (Myalgia)     No Clinical Screening - See Comments Itching     Fragrance     Vioxx Other (See Comments)     Heart races     Conjugated Estrogens Rash     Sulfa Drugs Rash       Current Medications:   Current Outpatient Medications   Medication Sig Dispense Refill     albuterol (PROAIR HFA/PROVENTIL HFA/VENTOLIN HFA) 108 (90 Base) MCG/ACT inhaler Inhale 2-4 puffs into the lungs every 2 hours as needed for shortness of breath / dyspnea 1 Inhaler 1     amLODIPine (NORVASC) 2.5 MG tablet Take 1 tablet (2.5 mg) by mouth 2 times daily 180 tablet 3     aspirin (SB LOW DOSE ASA EC) 81 MG EC tablet Take 81 mg by mouth daily       atenolol (TENORMIN) 50 MG tablet Take 1 tablet (50 mg) by mouth 2 times daily  180 tablet 3     azelastine (ASTEPRO) 0.15 % nasal spray 1 spray       Cholecalciferol (VITAMIN D-3 PO) Take 2 tablets by mouth       clotrimazole (LOTRIMIN) 1 % cream Apply topically daily       Continuous Blood Gluc Sensor (FREESTYLE BRANDY 14 DAY SENSOR) [facility] APPLY 1 SENSOR AND CHANGE EVERY 14 DAYS AS DIRECTED 2 each 2     diphenhydrAMINE-acetaminophen (TYLENOL PM)  MG tablet Take 1 tablet by mouth At Bedtime Reported on 3/20/2017       ezetimibe (ZETIA) 10 MG tablet Take 1 tablet (10 mg) by mouth daily 90 tablet 3     famotidine (PEPCID) 20 MG tablet Take 2 tablets (40 mg) by mouth as needed 180 tablet 3     fenofibrate (TRICOR) 145 MG tablet Take 1 tablet (145 mg) by mouth daily 90 tablet 3     ferrous sulfate (IRON) 325 (65 FE) MG tablet Take 325 mg by mouth daily (with breakfast)       fexofenadine (ALLEGRA) 180 MG tablet Take 180 mg by mouth daily. 120 0     fluticasone (FLONASE) 50 MCG/ACT spray Spray 2 sprays in nostril daily 2 sprays in each nostril qd 1 Bottle 0     furosemide (LASIX) 20 MG tablet TAKE ONE TABLET (20MG) BY MOUTH ONCE DAILY; TAKE AN ADDITIONAL TABLET (20MG) IN THE AFTERNOON ON MONDAY, WEDNESDAY AND FRIDAY 135 tablet 0     gabapentin (NEURONTIN) 100 MG capsule Take 1 capsule (100 mg) by mouth every evening as needed 90 capsule 3     levothyroxine (SYNTHROID) 112 MCG tablet Take 112 mcg by mouth daily Take 112 mcg daily except for Friday takes only 56 mcg.  Brand name Synthroid       lisinopril (ZESTRIL) 40 MG tablet TAKE ONE TABLET BY MOUTH ONCE DAILY 90 tablet 3     metoclopramide (REGLAN) 5 MG tablet Take 1-2 tablets (5-10 mg) by mouth 2 times daily as needed a 120 tablet 0     MULTIVITAMINS OR TABS ONE DAILY 100 3     nitroGLYcerin (NITROSTAT) 0.4 MG sublingual tablet Place 1 tablet (0.4 mg) under the tongue every 5 minutes as needed for chest pain (no more than 3 in one hour; after 3rd, call 911.) 25 tablet 3     nystatin (MYCOSTATIN) cream Apply topically daily as needed         nystatin-triamcinolone (MYCOLOG II) cream Apply topically daily as needed       ondansetron (ZOFRAN) 4 MG tablet Take 1 tablet by mouth every 6 hours as needed Reported on 3/20/2017       order for DME Equipment being ordered: Compression stockings - Knee High; 20-30 mmHg compression - note would like adhesive band to keep the stocking from sliding down 3 each 0     order for DME Equipment being ordered: Oral appliance for sleep apnea 1 Units 0     pantoprazole (PROTONIX) 40 MG EC tablet Take 40 mg by mouth 2 times daily       sitagliptin (JANUVIA) 50 MG tablet Januvia 50 mg tablet       TRESIBA FLEXTOUCH 100 UNIT/ML pen Inject 5 Units Subcutaneous daily       tretinoin (RETIN-A) 0.025 % external cream Use every night as tolerated - spot treat lesion 20 g 3          Current Pain Medications:  Medications related to Pain Management (From now, onward)    None           Past Pain Medications:  Tylenol   Gabapentin 100 mg prn. - this was for polio for the foot nerve pain.     Blood thinner: 81 mg ASA    Work History: Retired nursing     Current work status: retired     Psychosocial History:     History of treatment for behavioral disorder: depression/anxiety   History of suicidal ideation or suicidal attempt: None     Review of Systems:  Review of Systems   Constitutional: Positive for chills. Negative for fever and weight loss.   HENT: Positive for ear pain. Negative for ear discharge, hearing loss, nosebleeds, sinus pain, sore throat and tinnitus.    Eyes: Negative for double vision, pain and redness.   Cardiovascular: Negative for chest pain, palpitations and orthopnea.   Gastrointestinal: Positive for abdominal pain, heartburn, nausea and vomiting. Negative for blood in stool, constipation, diarrhea and melena.   Genitourinary: Positive for flank pain and urgency. Negative for dysuria and hematuria.   Musculoskeletal: Positive for myalgias. Negative for back pain and neck pain.   Skin: Positive for itching. Negative  for rash.   Endo/Heme/Allergies: Negative for polydipsia.   Psychiatric/Behavioral: Negative for hallucinations.         Physical Exam:     Vitals:    06/17/21 1350   Resp: 16   Weight: 87.1 kg (192 lb)   Height: 1.524 m (5')       General Appearance: No distress, seated comfortably  Mood: Euthymic  HE ENT: Non constricted pupils  Respiratory: Non labored breathing  CVS: Regular rate and rhythm  GI: Soft, non distended, no TTP  Skin: No rashes over exposed skin  MS: Has limited ROM of bilateral shoulders with left worse than the right with 90 of shoudler abduction and flexion on left and 100 degrees on the right with abduction and flexion. She has internal rotation to L5 and PSIS area bilaterally.  She has normal strength with SA, EE. EF. WE, FA, and .  She has reduced ROm and strength above 75 degrees in bilateral shoulders with left being worse then the right.  She is tender over the left biceps tendon with positive Yergason and speeds.   Neuro: sensation intact grossly throughout BLUE      Laboratory results:  Recent Labs   Lab Test 01/18/21  1719 11/30/20 11/04/20  1232     --  139   POTASSIUM 4.5 4.7 4.4   CHLORIDE 108  --  107   CO2 25  --  27   ANIONGAP 8  --  5   * 101 123*   BUN 28  --  30   CR 1.03 1.03 1.19*   RINKU 9.4  --  9.5       CBC RESULTS:   Recent Labs   Lab Test 04/26/21  1256   WBC 7.2   RBC 3.77*   HGB 11.7   HCT 35.0   MCV 93   MCH 31.0   MCHC 33.4   RDW 12.5            Imaging:       ASSESSMENT AND PLAN:     Encounter Diagnosis:  1. Post surgical pain and reduced ROM of bilateral shoulders  2. Myofascial pain   3. Biceps tendinitis     korey Hodgson is a 75 year old y.o. old female with a PMHx significant for polio and chronic RLE paralysis and foot drop as well as shoulder OA and pain due to work injury in 2000 with ongoing pain in the shoulder who presents to the pain clinic with shoulder and biceps pain with limited ROM after surgery.    I have summarized the  patient s past medical history, discussed their clinical findings and the potential differential diagnosis with the patient. Significant past medical history pertinent to the patient s current condition includes bilateral shoulder surgery with poor tolerability for ROM above 90 degrees.  The clinical findings reveal limited ROM and limited strength for abduction and flexion in bilateral shoulders. The differential diagnosis discussed with the patient are listed above. I have discussed anatomy and possible sources of the pain using models and/or pictures (diagrams). I have discussed multi- disciplinary pain management options withthe patient as pertaining to their case as detailed above. The pain management options we discussed included, but were not limited to the recommendations below.  I also discussed with patient the risks, benefits and alternatives to each pain management option.  All of the patient s questions and concerns were answered to the best of my ability.    RECOMMENDATIONS:     1. Medications: We are not prescribing the patient any medications today.  Recommend that she continue to use ice and heat and Tylenol for pain management     2. Procedure: We are not scheduling the patient for any procedures today but did discuss in detail the possibility to perform a suprascapular nerve block to both shoulders for pain to which the patient would like to try conservative management with PT and acupuncture first.  She will follow-up with us if she lori like to pursue this in the future.     3. Physical therapy: I have refered the patient for outpatient physical therapy for stretching, strengthening and home exercise program for bilateral shoulder pain and reduced ROM with overhead and flexion. The patient will also discuss spine care and posture. Pool therapy and stretches can be considered if available.    4.  Referral for acupuncture today    Follow up: As needed      Answers for HPI/ROS submitted by the  patient on 6/16/2021   General Symptoms: Yes  Skin Symptoms: Yes  HENT Symptoms: Yes  EYE SYMPTOMS: Yes  HEART SYMPTOMS: Yes  LUNG SYMPTOMS: No  INTESTINAL SYMPTOMS: Yes  URINARY SYMPTOMS: Yes  GYNECOLOGIC SYMPTOMS: No  BREAST SYMPTOMS: No  SKELETAL SYMPTOMS: Yes  BLOOD SYMPTOMS: No  NERVOUS SYSTEM SYMPTOMS: No  MENTAL HEALTH SYMPTOMS: No  Loss of appetite: No  Weight gain: Yes  Fatigue: Yes  Night sweats: Yes  Increased stress: Yes  Excessive hunger: No  Feeling hot or cold when others believe the temperature is normal: Yes  Loss of height: No  Post-operative complications: No  Surgical site pain: Yes  Change in or Loss of Energy: No  Hyperactivity: No  Confusion: No  Changes in hair: Yes  Changes in moles/birth marks: No  Changes in nails: Yes  Acne: No  Hair in places you don't want it: No  Change in facial hair: No  Warts: No  Non-healing sores: Yes  Scarring: No  Flaking of skin: Yes  Color changes of hands/feet in cold : No  Sun sensitivity: Yes  Skin thickening: No  Congestion: No  Trouble swallowing: No   Voice hoarseness: No  Mouth sores: No  Tooth pain: Yes  Gum tenderness: Yes  Bleeding gums: No  Change in taste: No  Change in sense of smell: No  Dry mouth: No  Hearing aid used: No  Neck lump: No  Vision loss: No  Dry eyes: Yes  Watery eyes: No  Eye bulging: No  Flashing of lights: No  Spots: Yes  Floaters: Yes  Crossed eyes: No  Tunnel Vision: No  Yellowing of eyes: No  Eye irritation: Yes  Pain in legs with walking: No  Fingers or toes appear blue: No  High blood pressure: Yes  Low blood pressure: No  Fainting: No  Murmurs: No  Pacemaker: No  Varicose veins: No  Edema or swelling: Yes  Wake up at night with shortness of breath: No  Light-headedness: No  Exercise intolerance: No  Bloating: Yes  Rectal or Anal pain: Yes  Fecal incontinence: No  Yellowing of skin or eyes: No  Vomit with blood: No  Change in stools: Yes  Trouble holding urine or incontinence: Yes  Increased frequency of urination:  Yes  Decreased frequency of urination: No  Frequent nighttime urination: No  Difficulty emptying bladder: No  Swollen joints: No  Joint pain: Yes  Bone pain: No  Muscle cramps: Yes  Muscle weakness: Yes  Joint stiffness: No  Bone fracture: No    ATTENDING ATTESTATION  I saw the patient along with the pain medicine fellow Dr. Ulises Rodriguez. Please see his note above for full details. I have edited his note and agree with it. I was involved in gathering history, physical examination and development of the plan of care.         Again, thank you for allowing me to participate in the care of your patient.      Sincerely,    Amada Priest MD

## 2021-06-17 NOTE — PROGRESS NOTES
Pain Clinic New Patient Consult Note:    Referring Provider: Kaycee   Primary care provider: Shola Benoit.    Chasity Hodgson is a 75 year old y.o. old female with a PMHx significant for polio and chronic RLE paralysis and foot drop as well as shoulder OA and pain due to work injury in 2000 with ongoing pain in the shoulder who presents to the pain clinic with shoulder and biceps pain with limited ROM after surgery.  She states that she has had shoulder replacement ont eh right in 2018 and then in 2020 they did the left shoulder and both biceps have been decompressed.  She is working with phsyical therapy currently which is helping but she is having trouple reaching overhead from the elbow up that causes pain.  She describes this pain as a bad achying type pain with occasional sharp pain that occurs with certain movements where she will have sharp pain from the elbow up to the shoulder.  She has a very small amount of neck pain lateral by the top of the shoulder over the trapezius.  She uses ice and tylenol which helps.  Ice feels nice but not sure if it helps.  She denies any numbness or tingling, numbness.  She states she still has some weakness for overhead activity but with the pain she is limited in tolerating this.  She is working on her HEP for shoulder ROM which is tougher to manage at home.  She otherwise denies any weakness.  She has chronic weakness of the RLE due to polio when she was 3 which left the right leg paralyzes and now has chronic foot drop.      HPI:  Patient Supplied Answers To the [facility] Pain Questionnaire  [facility] -  Patient Entered Questionnaire/Answers 6/16/2021   What number best describes your pain right now:  0 = No pain  to  10 = Worst pain imaginable 7   How would you describe the pain? burning, sharp, dull, aching, throbbing, other   Which of the following worsen your pain? standing, sitting, exercise   Which of the following improve or reduce your pain?  medication,  relaxation   What number best describes your average pain for the past week:  0 = No pain  to  10 = Worst pain imaginable 7   What number best describes your LOWEST pain in past 24 hours:  0 = No pain  to  10 = Worst pain imaginable 3   What number best describes your WORST pain in past 24 hours:  0 = No pain  to  10 = Worst pain imaginable 9   When is your pain worst? AM, PM   What non-medicine treatments have you already had for your pain? physical therapy, surgery, exercise, other   Have you tried treating your pain with medication?  Yes   Are you currently taking medications for your pain? Yes     Pain treatments:  Herbal medicines: biotin, CoQ10, Caltrate  Physical therapy: Physical therapy was last done 3 weeks ago and she was seeing Alfredo at [facility] and they wanted more but she needed to see us first as this is workman's comp case.    Chiropractor: not yet but would consider  Pain physician: no  Surgery: bilateral should surgeries   Biofeedback: none   Acupuncture: not yet but wanting this as treatment     Tests/Imaging reviewed with the patient:  None     Significant Medical History:   Past Medical History:   Diagnosis Date     Abdominal adhesions 1984, 96,99    s/p lysis     Allergic rhinitis, cause unspecified      Carotid stenosis      CPAP (continuous positive airway pressure) dependence      Diabetic gastroparesis (H)      Diet-controlled type 2 diabetes mellitus (H)      DVT of axillary vein, acute right (H)      Fibromyalgia      Gastro-oesophageal reflux disease      Hernia of unspecified site of abdominal cavity without mention of obstruction or gangrene     bilateral     HTN (hypertension)      Hypertriglyceridemia      Obstructive sleep apnea      ANNETTE (obstructive sleep apnea)      Papillary carcinoma of thyroid (H)     s/p thyroidectomy - Ruegemer     PE (pulmonary embolism)      Poliomyelitis     poor circulation right leg     Postsurgical hypothyroidism     s/p papillary thryoid cancer -  Ruegemer     Pulmonary embolism (H)      Rosacea      S/P carpal tunnel release     bilateral     S/P hardware removal 01/2014    still with lingering foot pain     S/P shoulder surgery     bilateral     Septic joint (H)     right knee     Venous insufficiency      Venous thrombosis 1999    right axillary vein          Past Surgical History:  Past Surgical History:   Procedure Laterality Date     AMPUTATE TOE(S)  3/15/2012    Procedure:AMPUTATE TOE(S); Surgeon:SUKHDEEP METZ; Location: OR     APPENDECTOMY  1972     ARTHRODESIS FOOT  3/15/2012    Procedure:ARTHRODESIS FOOT; RIGHT TRIPLE ARTHRODESIS, FIFTH TOE AMPUTATION, LATERAL LIGAMENT RECONSTRUCTION, TENDON TRANSFER AND RELEASE [MINI C-ARM, ARTHREX 4.5 AND 6.7 STAPLES, BIOCOMPOSITE TENODESIS SCREWS]; Surgeon:SUKHDEEP METZ; Location: OR      FREEING BOWEL ADHESION,ENTEROLYSIS      1986, 1996, 1999     C TOTAL ABDOM HYSTERECTOMY  1980    + BSO     CHOLECYSTECTOMY       COLOSTOMY  2/7/2012    Procedure:COLOSTOMY; CREATION OF SIGMOID COLOSTOMY AND EXTENSIVE  LYSIS OF ADHESIONS; Surgeon:MONTSERRAT BENDER; Location: OR     GI SURGERY      weakened rectal sphincter with artificial stimulator     LAPAROTOMY, LYSIS ADHESIONS, COMBINED  2/7/2012    Procedure:COMBINED LAPAROTOMY, LYSIS ADHESIONS; Surgeon:MONTSERRAT BENDER; Location: OR     RELEASE TENDON FOOT  3/15/2012    Procedure:RELEASE TENDON FOOT; Surgeon:SUKHDEEP METZ; Location: OR     REMOVE HARDWARE FOOT  12/13/2012    Procedure: REMOVE HARDWARE FOOT;  RIGHT FOOT REMOVAL OF HARDWARE;  Surgeon: Sukhdeep Metz MD;  Location:  OR     [facility] NONSPECIFIC PROCEDURE      throidectomy          Family History:  Family History   Problem Relation Age of Onset     Arthritis Mother      Hypertension Mother      Cerebrovascular Disease Mother      Obesity Mother      Heart Disease Mother         MI's     Hypertension Father      Respiratory Father         Adult RDS      Diabetes Father         adult     Arthritis Sister      Cancer Sister      Arthritis Sister      Hypertension Sister      Cancer Sister         colon polup     Heart Disease Brother         MI at 54     Hypertension Sister      Lipids Brother      Hypertension Brother      Lipids Sister      Obesity Sister      Obesity Maternal Grandmother      Skin Cancer Maternal Grandmother         skin cancer unknown     Cancer Maternal Grandmother         unknown skin cancer on face          Social History:  Social History     Socioeconomic History     Marital status:      Spouse name: Not on file     Number of children: 2     Years of education: Not on file     Highest education level: Not on file   Occupational History     Not on file   Social Needs     Financial resource strain: Not on file     Food insecurity     Worry: Not on file     Inability: Not on file     Transportation needs     Medical: Not on file     Non-medical: Not on file   Tobacco Use     Smoking status: Never Smoker     Smokeless tobacco: Never Used   Substance and Sexual Activity     Alcohol use: Not Currently     Alcohol/week: 0.0 standard drinks     Drug use: No     Sexual activity: Not Currently     Partners: Male     Birth control/protection: Female Surgical   Lifestyle     Physical activity     Days per week: Not on file     Minutes per session: Not on file     Stress: Not on file   Relationships     Social connections     Talks on phone: Not on file     Gets together: Not on file     Attends Pentec[employer]alism service: Not on file     Active member of club or organization: Not on file     Attends meetings of clubs or organizations: Not on file     Relationship status: Not on file     Intimate partner violence     Fear of current or ex partner: No     Emotionally abused: No     Physically abused: No     Forced sexual activity: No   Other Topics Concern     Parent/sibling w/ CABG, MI or angioplasty before 65F 55M? Not Asked      Service Not Asked      Blood Transfusions Not Asked     Caffeine Concern Yes     Comment: occ     Occupational Exposure Not Asked     Hobby Hazards Not Asked     Sleep Concern Yes     Stress Concern Yes     Weight Concern Not Asked     Special Diet Yes     Comment: low carb, low sugar      Back Care Not Asked     Exercise No     Comment: occ. stationary bike      Bike Helmet Not Asked     Seat Belt Yes     Self-Exams Not Asked   Social History Narrative    , 2 children    Retired in medical records at [employer]      Social History     Social History Narrative    , 2 children    Retired in medical records at [employer]           Allergies:  Allergies   Allergen Reactions     Nsaids Difficulty breathing     Increased creatinine     Toradol Difficulty breathing     Shortness of breath     Celecoxib Itching and Rash     Codeine Itching     With higher doses     Crestor [Rosuvastatin] Muscle Pain (Myalgia)     No Clinical Screening - See Comments Itching     Fragrance     Vioxx Other (See Comments)     Heart races     Conjugated Estrogens Rash     Sulfa Drugs Rash       Current Medications:   Current Outpatient Medications   Medication Sig Dispense Refill     albuterol (PROAIR HFA/PROVENTIL HFA/VENTOLIN HFA) 108 (90 Base) MCG/ACT inhaler Inhale 2-4 puffs into the lungs every 2 hours as needed for shortness of breath / dyspnea 1 Inhaler 1     amLODIPine (NORVASC) 2.5 MG tablet Take 1 tablet (2.5 mg) by mouth 2 times daily 180 tablet 3     aspirin (SB LOW DOSE ASA EC) 81 MG EC tablet Take 81 mg by mouth daily       atenolol (TENORMIN) 50 MG tablet Take 1 tablet (50 mg) by mouth 2 times daily 180 tablet 3     azelastine (ASTEPRO) 0.15 % nasal spray 1 spray       Cholecalciferol (VITAMIN D-3 PO) Take 2 tablets by mouth       clotrimazole (LOTRIMIN) 1 % cream Apply topically daily       Continuous Blood Gluc Sensor (FREESTYLE BRANDY 14 DAY SENSOR) [facility] APPLY 1 SENSOR AND CHANGE EVERY 14 DAYS AS DIRECTED 2 each 2      diphenhydrAMINE-acetaminophen (TYLENOL PM)  MG tablet Take 1 tablet by mouth At Bedtime Reported on 3/20/2017       ezetimibe (ZETIA) 10 MG tablet Take 1 tablet (10 mg) by mouth daily 90 tablet 3     famotidine (PEPCID) 20 MG tablet Take 2 tablets (40 mg) by mouth as needed 180 tablet 3     fenofibrate (TRICOR) 145 MG tablet Take 1 tablet (145 mg) by mouth daily 90 tablet 3     ferrous sulfate (IRON) 325 (65 FE) MG tablet Take 325 mg by mouth daily (with breakfast)       fexofenadine (ALLEGRA) 180 MG tablet Take 180 mg by mouth daily. 120 0     fluticasone (FLONASE) 50 MCG/ACT spray Spray 2 sprays in nostril daily 2 sprays in each nostril qd 1 Bottle 0     furosemide (LASIX) 20 MG tablet TAKE ONE TABLET (20MG) BY MOUTH ONCE DAILY; TAKE AN ADDITIONAL TABLET (20MG) IN THE AFTERNOON ON MONDAY, WEDNESDAY AND FRIDAY 135 tablet 0     gabapentin (NEURONTIN) 100 MG capsule Take 1 capsule (100 mg) by mouth every evening as needed 90 capsule 3     levothyroxine (SYNTHROID) 112 MCG tablet Take 112 mcg by mouth daily Take 112 mcg daily except for Friday takes only 56 mcg.  Brand name Synthroid       lisinopril (ZESTRIL) 40 MG tablet TAKE ONE TABLET BY MOUTH ONCE DAILY 90 tablet 3     metoclopramide (REGLAN) 5 MG tablet Take 1-2 tablets (5-10 mg) by mouth 2 times daily as needed a 120 tablet 0     MULTIVITAMINS OR TABS ONE DAILY 100 3     nitroGLYcerin (NITROSTAT) 0.4 MG sublingual tablet Place 1 tablet (0.4 mg) under the tongue every 5 minutes as needed for chest pain (no more than 3 in one hour; after 3rd, call 911.) 25 tablet 3     nystatin (MYCOSTATIN) cream Apply topically daily as needed        nystatin-triamcinolone (MYCOLOG II) cream Apply topically daily as needed       ondansetron (ZOFRAN) 4 MG tablet Take 1 tablet by mouth every 6 hours as needed Reported on 3/20/2017       order for DME Equipment being ordered: Compression stockings - Knee High; 20-30 mmHg compression - note would like adhesive band to keep  the stocking from sliding down 3 each 0     order for DME Equipment being ordered: Oral appliance for sleep apnea 1 Units 0     pantoprazole (PROTONIX) 40 MG EC tablet Take 40 mg by mouth 2 times daily       sitagliptin (JANUVIA) 50 MG tablet Januvia 50 mg tablet       TRESIBA FLEXTOUCH 100 UNIT/ML pen Inject 5 Units Subcutaneous daily       tretinoin (RETIN-A) 0.025 % external cream Use every night as tolerated - spot treat lesion 20 g 3          Current Pain Medications:  Medications related to Pain Management (From now, onward)    None           Past Pain Medications:  Tylenol   Gabapentin 100 mg prn. - this was for polio for the foot nerve pain.     Blood thinner: 81 mg ASA    Work History: Retired nursing     Current work status: retired     Psychosocial History:     History of treatment for behavioral disorder: depression/anxiety   History of suicidal ideation or suicidal attempt: None     Review of Systems:  Review of Systems   Constitutional: Positive for chills. Negative for fever and weight loss.   HENT: Positive for ear pain. Negative for ear discharge, hearing loss, nosebleeds, sinus pain, sore throat and tinnitus.    Eyes: Negative for double vision, pain and redness.   Cardiovascular: Negative for chest pain, palpitations and orthopnea.   Gastrointestinal: Positive for abdominal pain, heartburn, nausea and vomiting. Negative for blood in stool, constipation, diarrhea and melena.   Genitourinary: Positive for flank pain and urgency. Negative for dysuria and hematuria.   Musculoskeletal: Positive for myalgias. Negative for back pain and neck pain.   Skin: Positive for itching. Negative for rash.   Endo/Heme/Allergies: Negative for polydipsia.   Psychiatric/Behavioral: Negative for hallucinations.         Physical Exam:     Vitals:    06/17/21 1350   Resp: 16   Weight: 87.1 kg (192 lb)   Height: 1.524 m (5')       General Appearance: No distress, seated comfortably  Mood: Euthymic  HE ENT: Non constricted  pupils  Respiratory: Non labored breathing  CVS: Regular rate and rhythm  GI: Soft, non distended, no TTP  Skin: No rashes over exposed skin  MS: Has limited ROM of bilateral shoulders with left worse than the right with 90 of shoudler abduction and flexion on left and 100 degrees on the right with abduction and flexion. She has internal rotation to L5 and PSIS area bilaterally.  She has normal strength with SA, EE. EF. WE, FA, and .  She has reduced ROm and strength above 75 degrees in bilateral shoulders with left being worse then the right.  She is tender over the left biceps tendon with positive Yergason and speeds.   Neuro: sensation intact grossly throughout BLUE      Laboratory results:  Recent Labs   Lab Test 01/18/21  1719 11/30/20 11/04/20  1232     --  139   POTASSIUM 4.5 4.7 4.4   CHLORIDE 108  --  107   CO2 25  --  27   ANIONGAP 8  --  5   * 101 123*   BUN 28  --  30   CR 1.03 1.03 1.19*   RINKU 9.4  --  9.5       CBC RESULTS:   Recent Labs   Lab Test 04/26/21  1256   WBC 7.2   RBC 3.77*   HGB 11.7   HCT 35.0   MCV 93   MCH 31.0   MCHC 33.4   RDW 12.5            Imaging:       ASSESSMENT AND PLAN:     Encounter Diagnosis:  1. Post surgical pain and reduced ROM of bilateral shoulders  2. Myofascial pain   3. Biceps tendinitis     korey Hodgson is a 75 year old y.o. old female with a PMHx significant for polio and chronic RLE paralysis and foot drop as well as shoulder OA and pain due to work injury in 2000 with ongoing pain in the shoulder who presents to the pain clinic with shoulder and biceps pain with limited ROM after surgery.    I have summarized the patient s past medical history, discussed their clinical findings and the potential differential diagnosis with the patient. Significant past medical history pertinent to the patient s current condition includes bilateral shoulder surgery with poor tolerability for ROM above 90 degrees.  The clinical findings reveal limited ROM and  limited strength for abduction and flexion in bilateral shoulders. The differential diagnosis discussed with the patient are listed above. I have discussed anatomy and possible sources of the pain using models and/or pictures (diagrams). I have discussed multi- disciplinary pain management options withthe patient as pertaining to their case as detailed above. The pain management options we discussed included, but were not limited to the recommendations below.  I also discussed with patient the risks, benefits and alternatives to each pain management option.  All of the patient s questions and concerns were answered to the best of my ability.    RECOMMENDATIONS:     1. Medications: We are not prescribing the patient any medications today.  Recommend that she continue to use ice and heat and Tylenol for pain management     2. Procedure: We are not scheduling the patient for any procedures today but did discuss in detail the possibility to perform a suprascapular nerve block to both shoulders for pain to which the patient would like to try conservative management with PT and acupuncture first.  She will follow-up with us if she lori like to pursue this in the future.     3. Physical therapy: I have refered the patient for outpatient physical therapy for stretching, strengthening and home exercise program for bilateral shoulder pain and reduced ROM with overhead and flexion. The patient will also discuss spine care and posture. Pool therapy and stretches can be considered if available.    4.  Referral for acupuncture today    Follow up: As needed      Answers for HPI/ROS submitted by the patient on 6/16/2021   General Symptoms: Yes  Skin Symptoms: Yes  HENT Symptoms: Yes  EYE SYMPTOMS: Yes  HEART SYMPTOMS: Yes  LUNG SYMPTOMS: No  INTESTINAL SYMPTOMS: Yes  URINARY SYMPTOMS: Yes  GYNECOLOGIC SYMPTOMS: No  BREAST SYMPTOMS: No  SKELETAL SYMPTOMS: Yes  BLOOD SYMPTOMS: No  NERVOUS SYSTEM SYMPTOMS: No  MENTAL HEALTH SYMPTOMS:  No  Loss of appetite: No  Weight gain: Yes  Fatigue: Yes  Night sweats: Yes  Increased stress: Yes  Excessive hunger: No  Feeling hot or cold when others believe the temperature is normal: Yes  Loss of height: No  Post-operative complications: No  Surgical site pain: Yes  Change in or Loss of Energy: No  Hyperactivity: No  Confusion: No  Changes in hair: Yes  Changes in moles/birth marks: No  Changes in nails: Yes  Acne: No  Hair in places you don't want it: No  Change in facial hair: No  Warts: No  Non-healing sores: Yes  Scarring: No  Flaking of skin: Yes  Color changes of hands/feet in cold : No  Sun sensitivity: Yes  Skin thickening: No  Congestion: No  Trouble swallowing: No   Voice hoarseness: No  Mouth sores: No  Tooth pain: Yes  Gum tenderness: Yes  Bleeding gums: No  Change in taste: No  Change in sense of smell: No  Dry mouth: No  Hearing aid used: No  Neck lump: No  Vision loss: No  Dry eyes: Yes  Watery eyes: No  Eye bulging: No  Flashing of lights: No  Spots: Yes  Floaters: Yes  Crossed eyes: No  Tunnel Vision: No  Yellowing of eyes: No  Eye irritation: Yes  Pain in legs with walking: No  Fingers or toes appear blue: No  High blood pressure: Yes  Low blood pressure: No  Fainting: No  Murmurs: No  Pacemaker: No  Varicose veins: No  Edema or swelling: Yes  Wake up at night with shortness of breath: No  Light-headedness: No  Exercise intolerance: No  Bloating: Yes  Rectal or Anal pain: Yes  Fecal incontinence: No  Yellowing of skin or eyes: No  Vomit with blood: No  Change in stools: Yes  Trouble holding urine or incontinence: Yes  Increased frequency of urination: Yes  Decreased frequency of urination: No  Frequent nighttime urination: No  Difficulty emptying bladder: No  Swollen joints: No  Joint pain: Yes  Bone pain: No  Muscle cramps: Yes  Muscle weakness: Yes  Joint stiffness: No  Bone fracture: No    ATTENDING ATTESTATION  I saw the patient along with the pain medicine fellow Dr. Ulises Rodriguez. Please  see his note above for full details. I have edited his note and agree with it. I was involved in gathering history, physical examination and development of the plan of care.

## 2021-06-17 NOTE — PATIENT INSTRUCTIONS
Apply aquaphor and gauze to open areas left leg, then wrap entire lower leg with ACE bandage    Elevated left leg frequently    Start Keflex today

## 2021-06-17 NOTE — PROGRESS NOTES
"Assessment and Plan:     (R60.0) Edema of left lower leg  (primary encounter diagnosis)  Comment: distal pulses intact, no signs of ischemia, remote hx of provoked DVT  Plan: US Lower Extremity Venous Duplex Left          (L03.116) Left leg cellulitis  Comment: no abscess, afebrile  Plan: cephALEXin (KEFLEX) 500 MG capsule        Elevate frequently, compression stocking or ace, dress area of open skin with bacitracin or aquaphor and gauze     (E11.69,  Z79.4) Type 2 diabetes mellitus with other specified complication, with long-term current use of insulin (H)  Comment: last A1c 6.2  Plan: continue current medications      Maude Ortiz PA-C        Raheem Cochran is a 75 year old who presents for the following health issues: left leg wound    HPI     Left leg felt like \"sunburned\" then area that started to peel in some areas, noticed 3-4 weeks ago, has worsened since  Noticed a scab last night   Redness/swelling more on the left lower leg vs right  Weeping clear/yellow drainage from areas of scabbed skin  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, picc-associated      No fever, no nausea/vomiting, no significant injury that she can recall, no cough, no sob, no chest pain      Review of Systems   See above      Objective    BP (!) 164/70 (BP Location: Right arm, Cuff Size: Adult Large)   Pulse 63   Temp 97.8  F (36.6  C) (Oral)   Ht 1.524 m (5')   Wt 89.6 kg (197 lb 8 oz)   SpO2 100%   BMI 38.57 kg/m      Repeat manual /58    Physical Exam   GENERAL: very pleasant, alert and no distress  RESP: lungs clear to auscultation - no rales, no rhonchi, no wheezes  CV: regular rates and rhythm, normal S1 S2, no S3 or S4 and no murmur, no click or rub   MS: RLE in brace, no erythema, no significant edema, LLE with erythema distal half of lower leg with moderate-severe pitting edema, small weeping area anterior shin, no purulence no abscess, distal pulses are palpable, no lesions on foot, calf is tender " posteriorly    Venous Doppler US: negative

## 2021-06-17 NOTE — PATIENT INSTRUCTIONS
Referrals:    Physical Therapy Referral placed- indications for Post Surgical Limited ROM of Shoulder.  If you have not heard from the scheduling office within 2 business days, please call [phone] for all locations       Acupuncture Referral.  -Please call your insurance provider to find out about acupuncture coverage, and approved locations for this service.     Here are a few suggestions:     [facility]- [place] [phone]    [facility] [phone]  Tristen Mercedes   [address], [place]        Procedures:    Call clinic if interested in Suprascapular Nerve Block as desired.      Call to schedule your procedure: [phone], option #2      Your pre-procedure instructions are below, please call our clinic if you have any questions.        Recommended Follow up:      Follow up as needed.       Please call [phone], option #1 to schedule your clinic appointment if you don't already have an appointment scheduled.        To speak with a nurse, schedule/reschedule/cancel a clinic appointment, or request a medication refill call: [phone], option #1.    You can also reach us by [facility]: [email or web address]

## 2021-06-18 ENCOUNTER — HOSPITAL ENCOUNTER (OUTPATIENT)
Dept: ULTRASOUND IMAGING | Facility: CLINIC | Age: 76
Discharge: HOME | End: 2021-06-18
Attending: PHYSICIAN ASSISTANT | Admitting: PHYSICIAN ASSISTANT
Payer: MEDICARE

## 2021-06-18 ENCOUNTER — TELEPHONE (OUTPATIENT)
Dept: FAMILY MEDICINE | Facility: CLINIC | Age: 76
End: 2021-06-18

## 2021-06-18 DIAGNOSIS — L03.116 LEFT LEG CELLULITIS: ICD-10-CM

## 2021-06-18 DIAGNOSIS — R60.0 EDEMA OF LEFT LOWER LEG: ICD-10-CM

## 2021-06-18 PROCEDURE — 93971 EXTREMITY STUDY: CPT | Mod: LT

## 2021-06-18 RX ORDER — CEPHALEXIN 500 MG/1
500 CAPSULE ORAL 4 TIMES DAILY
Qty: 40 CAPSULE | Refills: 0 | COMMUNITY
Start: 2021-06-18 | End: 2021-07-07

## 2021-06-18 NOTE — TELEPHONE ENCOUNTER
I called patient with results of her ultrasound (negative).  I also clarified her dose of Keflex.  She should be taking 500 mg 4 times daily and she has been doing this.  She will follow up in the clinic next week for recheck.

## 2021-06-21 NOTE — LETTER
Letter by Leonides Acuna NP at      Author: Leonides Acuna NP Service: -- Author Type: --    Filed:  Encounter Date: 11/24/2020 Status: (Other)         [address] [address]                                  November 25, 2020    Patient: Chasity Hodgson   MR Number: [id number]   YOB: 1945   Date of Visit: 11/24/2020     Dear Dr. Rendon:    Thank you for referring Chasity Hodgson to me for evaluation. Below are the relevant portions of my assessment and plan of care.    If you have questions, please do not hesitate to call me. I look forward to following Chasity along with you.    Sincerely,        Leonides Acuna NP          CC  No Recipients  Leonides Acuna NP  11/24/2020  4:59 PM  [facility]      Facility:    [facility] [[id number]]  Code Status: FULL CODE      Chief Complaint/Reason for Visit:  313  Chief Complaint   Patient presents with   ? Review Of Multiple Medical Conditions     malnutrition F/U left shoulder hemiarthroplasty, pain control        HPI:   Chasity is a 75 y.o. female who is a transfer from [facility] with an admission on 11/12/20 and discharge on 11/17/20. She has a PMH of DM2, CAD, hypothyroidism, DVT/PE, Htn, who underwent operative management of a left shouder glenohumeral degenerative joint disease with a hemiarthroplasty and biceps tenodesis. Postoperatively no issues other than some nausea which was remedied. She was given WBAT and tylenol/oxycodone for pain control. She was then discharged to the TCU.    Past Medical History:  Past Medical History:   Diagnosis Date   ? Abdominal adhesions    ? Acne rosacea    ? Allergic rhinitis    ? Alopecia    ? Anemia    ? CAD (coronary artery disease)    ? CKD (chronic kidney disease) stage 2, GFR 60-89 ml/min    ? 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)    ? Depression    ? DM2 (diabetes mellitus, type 2) (H)    ? DVT  (deep venous thrombosis) (H)    ? GERD (gastroesophageal reflux disease)    ? HTN (hypertension)    ? Hypertriglyceridemia    ? Hypokalemia    ? Hypothyroidism    ? ANNETTE on CPAP    ? Osteoarthritis of glenohumeral joint    ? Papillary carcinoma of thyroid (H)    ? Poliomyelitis    ? Pulmonary embolus (H)    ? Pulmonary nodule    ? Septic joint of right knee joint (H)    ? Venous insufficiency            Surgical History:  Past Surgical History:   Procedure Laterality Date   ? APPENDECTOMY     ? CARPAL TUNNEL RELEASE     ? CHOLECYSTECTOMY     ? COLOSTOMY     ? FOOT ARTHRODESIS Right     Right foot triple arthrodesis and removal of hardware   ? ROTATOR CUFF REPAIR     ? SHOULDER ARTHROSCOPY  06/25/2015    REVISION SUBACROMIAL DECOMPRESSION, EXCISION OF GANGLION CYST, DEBRIDEMENT AND EXCISION OF THE GLENOHUMERAL JOINT GANGLION CYST, CORACOID DECOMPRESSION, POSSIBLE SUBSCAPULARIS REPAIR AND OPEN SUBSCAPULARIS BICEP   ? SHOULDER SURGERY  11/12/2020    LEFT SHOULDER HEMIARHTROPLASTY, BICEP TENODESIS   ? TENDON RELEASE      foot   ? THYROIDECTOMY     ? TOE AMPUTATION     ? TOTAL ABDOMINAL HYSTERECTOMY         Family History:   No family history on file.    Social History:    Social History     Socioeconomic History   ? Marital status:      Spouse name: Not on file   ? Number of children: Not on file   ? Years of education: Not on file   ? Highest education level: Not on file   Occupational History   ? Not on file   Social Needs   ? Financial resource strain: Not on file   ? Food insecurity     Worry: Not on file     Inability: Not on file   ? Transportation needs     Medical: Not on file     Non-medical: Not on file   Tobacco Use   ? Smoking status: Never Smoker   ? Smokeless tobacco: Never Used   Substance and Sexual Activity   ? Alcohol use: Not Currently   ? Drug use: Not on file   ? Sexual activity: Not on file   Lifestyle   ? Physical activity     Days per week: Not on file     Minutes per session: Not on file  "  ? Stress: Not on file   Relationships   ? Social connections     Talks on phone: Not on file     Gets together: Not on file     Attends Ten[employer] service: Not on file     Active member of club or organization: Not on file     Attends meetings of clubs or organizations: Not on file     Relationship status: Not on file   ? Intimate partner violence     Fear of current or ex partner: Not on file     Emotionally abused: Not on file     Physically abused: Not on file     Forced sexual activity: Not on file   Other Topics Concern   ? Not on file   Social History Narrative   ? Not on file          Review of Systems   Constitutional: Positive for activity change. Negative for appetite change, chills, diaphoresis and fatigue.   HENT: Negative for congestion and hearing loss.    Eyes: Negative.    Cardiovascular: Positive for leg swelling.   Gastrointestinal: Negative for abdominal distention, abdominal pain, blood in stool, constipation, diarrhea and nausea.   Endocrine: Negative.    Genitourinary: Negative for difficulty urinating.   Musculoskeletal:        L UE   Skin:        L shoulder   Allergic/Immunologic: Negative.    Neurological: Negative for dizziness, tremors, speech difficulty and light-headedness.   Hematological: Negative.    Psychiatric/Behavioral: Positive for sleep disturbance. Negative for agitation, confusion and hallucinations. The patient is not nervous/anxious.        Vitals:    11/24/20 0742   BP: 118/62   Pulse: 62   Resp: 16   Temp: 97.3  F (36.3  C)   SpO2: 98%   Weight: 183 lb (83 kg)   Height: 4' 10.5\" (1.486 m)       Physical Exam  Constitutional:       Appearance: She is obese.   HENT:      Head: Normocephalic.      Right Ear: External ear normal.      Left Ear: External ear normal.      Nose: No congestion.      Mouth/Throat:      Pharynx: No oropharyngeal exudate.   Eyes:      General:         Right eye: No discharge.         Left eye: No discharge.   Neck:      Musculoskeletal: Normal range " of motion.   Cardiovascular:      Rate and Rhythm: Normal rate.      Pulses: Normal pulses.      Heart sounds: No murmur.   Pulmonary:      Effort: Pulmonary effort is normal. No respiratory distress.      Comments: RA, CTA  Abdominal:      General: There is no distension.      Palpations: Abdomen is soft.      Tenderness: There is no abdominal tenderness. There is no guarding.      Comments: Denies constipation or diarrhea, colostomy patent   Genitourinary:     Comments: No issues  Musculoskeletal:      Right lower leg: Edema present.      Left lower leg: Edema present.      Comments: Non pitting dependent BLE, has R AFO, pain in L UE. WBAT, wearing sling   Skin:     General: Skin is warm.      Comments: L shoulder incision intact   Neurological:      General: No focal deficit present.      Mental Status: She is oriented to person, place, and time.   Psychiatric:         Mood and Affect: Mood normal.         Behavior: Behavior normal.         Medication List:  Current Outpatient Medications   Medication Sig   ? acetaminophen (TYLENOL) 500 MG tablet Take 1,000 mg by mouth every 6 (six) hours as needed for pain.   ? amLODIPine (NORVASC) 2.5 MG tablet Take 2.5 mg by mouth 2 (two) times a day.   ? aspirin 81 MG EC tablet Take 81 mg by mouth daily.   ? atenoloL (TENORMIN) 50 MG tablet Take 50 mg by mouth 2 (two) times a day.   ? azelastine (ASTEPRO) 0.15 % (205.5 mcg) Spry nasal spray Apply 1 spray into each nostril at bedtime.   ? biotin 1 mg tablet Take 1,000 mcg by mouth daily.   ? cholecalciferol, vitamin D3, 5,000 unit Tab Take 5,000 Units by mouth daily.   ? clotrimazole (LOTRIMIN) 1 % cream Apply 1 application topically daily as needed.   ? diphenhydrAMINE-acetaminophen (TYLENOL PM)  mg Tab Take 1 tablet by mouth at bedtime as needed.   ? famotidine (PEPCID) 40 MG tablet Take 40 mg by mouth daily as needed for heartburn.   ? fenofibrate (TRICOR) 145 MG tablet Take 145 mg by mouth daily.   ? ferrous sulfate  325 (65 FE) MG tablet Take 1 tablet by mouth daily with breakfast.   ? fexofenadine (ALLEGRA) 180 MG tablet Take 180 mg by mouth daily.   ? fluticasone propionate (FLONASE) 50 mcg/actuation nasal spray Apply 1 spray into each nostril daily.   ? furosemide (LASIX) 20 MG tablet Take 20 mg by mouth daily as needed.   ? gabapentin (NEURONTIN) 100 MG capsule Take 100 mg by mouth every evening.   ? insulin glargine U-300 conc 300 unit/mL (3 mL) InPn Inject 5 Units under the skin daily before breakfast.   ? levothyroxine (SYNTHROID, LEVOTHROID) 112 MCG tablet Take 112 mcg by mouth daily. Except for Fridays. And 56 mcg on Friday.   ? lisinopriL (PRINIVIL,ZESTRIL) 40 MG tablet Take 40 mg by mouth daily.   ? mometasone (NASONEX) 50 mcg/actuation nasal spray 1 spray into each nostril 2 (two) times a day.   ? multivitamin (MULTIPLE VITAMINS ORAL) Take 1 tablet by mouth daily.   ? nitroglycerin (NITROSTAT) 0.4 MG SL tablet Place 0.4 mg under the tongue every 5 (five) minutes as needed for chest pain.   ? nystatin (MYCOSTATIN) cream Apply 1 application topically 2 (two) times a day as needed for dry skin.   ? nystatin (MYCOSTATIN) powder Apply 1 application topically daily. every Thu, Sun for use with colostomy after bath   ? nystatin-triamcinolone (MYCOLOG II) cream Apply 1 application topically daily as needed.   ? ondansetron (ZOFRAN) 4 MG tablet Take 4 mg by mouth every 8 (eight) hours as needed for nausea.   ? oxyCODONE (ROXICODONE) 5 MG immediate release tablet Take 1 tablet (5 mg total) by mouth every 4 (four) hours as needed for pain.   ? pantoprazole (PROTONIX) 40 MG tablet Take 40 mg by mouth 2 (two) times a day.   ? polyethylene glycol (MIRALAX) 17 gram packet Take 17 g by mouth daily as needed.   ? polyethylene glycol 400 (BLINK TEARS) 0.25 % Drop Apply 1 drop to eye 4 (four) times a day as needed.   ? senna-docusate (SENNOSIDES-DOCUSATE SODIUM) 8.6-50 mg tablet Take 1 tablet by mouth 2 (two) times a day as needed for  constipation.   ? SITagliptin (JANUVIA) 50 MG tablet Take 50 mg by mouth daily.   ? sucralfate (CARAFATE) 100 mg/mL suspension Take 1 g by mouth 2 (two) times a day as needed.       Labs:  Results for orders placed or performed in visit on 11/20/20   Basic Metabolic Panel   Result Value Ref Range    Sodium 134 (L) 136 - 145 mmol/L    Potassium 5.6 (H) 3.5 - 5.0 mmol/L    Chloride 101 98 - 107 mmol/L    CO2 26 22 - 31 mmol/L    Anion Gap, Calculation 7 5 - 18 mmol/L    Glucose 125 70 - 125 mg/dL    Calcium 8.6 8.5 - 10.5 mg/dL    BUN 35 (H) 8 - 28 mg/dL    Creatinine 1.15 (H) 0.60 - 1.10 mg/dL    GFR MDRD Af Amer 56 (L) >60 mL/min/1.73m2    GFR MDRD Non Af Amer 46 (L) >60 mL/min/1.73m2     Lab Results   Component Value Date    HGB 10.2 (L) 11/20/2020     No results found for: [id number]    No results found for: TSH    No results found for: HGBA1C    No results found for: [id number]      Assessment/Plan:    Left shouder glenohumeral degenerative joint disease with a hemiarthroplasty and biceps tenodesis: WBAT, f/u with ortho as ordered    Pain control: continue tylenol 1000mg four times a day and oxycodone 5mg q4h PRN (4x daily). Start vistaril 25mg three times a day, encourage icing    Acute hyperkalemia: last K 5.7 on 11/24, will give kayexalate 15gm x2 doses stat, recheck BMP on 11/25    R knee pain: continue lidocaine patch    CAD: continue ASA 81mg daily, statin and fenofibrate     HTN: continue lisinopril 40mg daily, amlodipine 2.5mg daily and atenolol 50mg BID    Allergies: continue fexofenadine and flonase daily. Advised to change allegra to PRN though reluctulant currently     Hypothyroidism: continue synthroid 112mcg daily, recheck TSH    DM2: continue U-300 5U daily, Januvia 50mg daily. BS <200    Neuropathy: continue gabapentin 100mg at HS    GERD: continue protonix 40mg two times a day, sucralfate 10mg two times a day PRN and pepcid 40mg daily PRN. Also has zofran PRN    Abdominal pain: Abx negative,  resolved. Possibly d/t GERD    Anemia: continue FeSO4 325mg daily, last Hgb 10.2, recheck CBC    Vit D def: continue D3 5000U daily, recheck    Insomnia: start melatonin 6mg at HS, discontinue tylenol PM    Morbid obesity: last BMI 37.6    Constipation: continue senna S 1 tab two times a day and miralax daily PRN    Labs: BMP, CBC, mag, TSH, vit D B12, A1c    Disposition: lives with son who has cerebral palsy. [facility] 23/30    The care plan has been reviewed and all orders signed. Changes to care plan, if any, as noted. Otherwise, continue care plan of care.  The total time spent with this patient/staff was 38 minutes, with greater than 50% spent in counseling and coordination of care that included multiple issues pain control, hyperkalemia, GERD and therapy, which lasted 20 minutes.         Electronically signed by: Leonides Acuna NP

## 2021-06-21 NOTE — LETTER
Letter by Luly Ray CNP at      Author: Luly Ray CNP Service: -- Author Type: --    Filed:  Encounter Date: 12/3/2020 Status: (Other)         Patient: Chasity Hodgson   MR Number: [id number]   YOB: 1945   Date of Visit: 12/3/2020     [facility]    Facility:   [facility] [[id number]]   Code Status: FULL CODE      CHIEF COMPLAINT/REASON FOR VISIT:  Chief Complaint   Patient presents with   ? Problem Visit     F/U left shoulder arthroplasty, anemia        HISTORY:      HPI: Chasity is a 75 y.o. female undergoing physical and occupational therapy at [facility]. She is  with a history of DM2, CAD, hypothyroid, DVT/PE, HTN who was admitted 11/12/2020 for operative management of left shoulder glenohumeral degenerative joint disease.  On 11/12/20 the patient underwent left shoulder hemiarthroplasty, biceps tenodesis.    Today she is seen for  follow up left shoulder arthroplasty,anemia  and pain management. Surgical incision C/D/I. Steri strips in place. Denies numbness or tingling.   Her pain is controlled.  She denied chest pain or shortness of breath.  Denied constipation or diarrhea.  She is with a colostomy.    She is being followed by the dietitian. PO intake fair.   Today she reports that she has abdominal  discomfort every time she takes then iron tablet. She requested to have it discontinued.  Hgb 12/2/20 was 9.1. She also reported intermittent right groin pain and feels it is related to a pinched nerve in her spine. Today she denied it.     Past Medical History:   Diagnosis Date   ? Abdominal adhesions    ? Acne rosacea    ? Allergic rhinitis    ? Alopecia    ? Anemia    ? CAD (coronary artery disease)    ? CKD (chronic kidney disease) stage 2, GFR 60-89 ml/min    ? 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)    ? Depression    ? DM2 (diabetes mellitus, type 2) (H)    ? DVT (deep venous thrombosis) (H)    ? GERD (gastroesophageal reflux disease)    ? HTN  (hypertension)    ? Hypertriglyceridemia    ? Hypokalemia    ? Hypothyroidism    ? ANNETTE on CPAP    ? Osteoarthritis of glenohumeral joint    ? Papillary carcinoma of thyroid (H)    ? Poliomyelitis    ? Pulmonary embolus (H)    ? Pulmonary nodule    ? Septic joint of right knee joint (H)    ? Venous insufficiency              No family history on file.  Social History     Socioeconomic History   ? Marital status:      Spouse name: Not on file   ? Number of children: Not on file   ? Years of education: Not on file   ? Highest education level: Not on file   Occupational History   ? Not on file   Social Needs   ? Financial resource strain: Not on file   ? Food insecurity     Worry: Not on file     Inability: Not on file   ? Transportation needs     Medical: Not on file     Non-medical: Not on file   Tobacco Use   ? Smoking status: Never Smoker   ? Smokeless tobacco: Never Used   Substance and Sexual Activity   ? Alcohol use: Not Currently   ? Drug use: Not on file   ? Sexual activity: Not on file   Lifestyle   ? Physical activity     Days per week: Not on file     Minutes per session: Not on file   ? Stress: Not on file   Relationships   ? Social connections     Talks on phone: Not on file     Gets together: Not on file     Attends Protestant service: Not on file     Active member of club or organization: Not on file     Attends meetings of clubs or organizations: Not on file     Relationship status: Not on file   ? Intimate partner violence     Fear of current or ex partner: Not on file     Emotionally abused: Not on file     Physically abused: Not on file     Forced sexual activity: Not on file   Other Topics Concern   ? Not on file   Social History Narrative   ? Not on file         Review of Systems   Constitutional: Positive for activity change. Negative for appetite change, fatigue and fever.        Nonweightbearing left upper extremity   HENT: Negative for congestion.    Eyes: Positive for visual disturbance.         Wears glasses   Respiratory: Negative for cough, shortness of breath and wheezing.         Patient with allergic rhinitis on multiple medications   Cardiovascular: Negative for chest pain and leg swelling.   Gastrointestinal: Negative for abdominal distention, abdominal pain, constipation, diarrhea and nausea.        Pt with a colostomy - she reported she had it 8 years    Genitourinary: Negative for dysuria.   Musculoskeletal: Positive for arthralgias. Negative for back pain.        Reports right knee arthritis and uses salonapa patches PRN    Skin: Positive for wound. Negative for color change.   Neurological: Negative for dizziness.   Psychiatric/Behavioral: Negative for agitation, behavioral problems and confusion.       Vitals:    12/02/20 1939   BP: 136/58   Pulse: (!) 58   Resp: 16   Temp: 97.8  F (36.6  C)   SpO2: 100%   Weight: 181 lb 6.4 oz (82.3 kg)       Physical Exam  Constitutional:       Appearance: She is well-developed.      Comments: Pleasant woman in on acute distress    HENT:      Head: Normocephalic.   Eyes:      Conjunctiva/sclera: Conjunctivae normal.   Neck:      Musculoskeletal: Normal range of motion.   Cardiovascular:      Rate and Rhythm: Normal rate and regular rhythm.      Heart sounds: Normal heart sounds. No murmur.   Pulmonary:      Effort: No respiratory distress.      Breath sounds: Normal breath sounds. No wheezing or rales.   Abdominal:      General: Bowel sounds are normal. There is no distension.      Palpations: Abdomen is soft.      Tenderness: There is no abdominal tenderness.   Musculoskeletal: Normal range of motion.      Comments: Steri strips left shoulder    Skin:     General: Skin is warm.   Neurological:      Mental Status: She is alert and oriented to person, place, and time.   Psychiatric:         Behavior: Behavior normal.           LABS:   Recent Results (from the past 240 hour(s))   Basic Metabolic Panel   Result Value Ref Range    Sodium 137 136 - 145  mmol/L    Potassium 5.7 (H) 3.5 - 5.0 mmol/L    Chloride 103 98 - 107 mmol/L    CO2 26 22 - 31 mmol/L    Anion Gap, Calculation 8 5 - 18 mmol/L    Glucose 139 (H) 70 - 125 mg/dL    Calcium 8.9 8.5 - 10.5 mg/dL    BUN 39 (H) 8 - 28 mg/dL    Creatinine 1.13 (H) 0.60 - 1.10 mg/dL    GFR MDRD Af Amer 57 (L) >60 mL/min/1.73m2    GFR MDRD Non Af Amer 47 (L) >60 mL/min/1.73m2   Basic Metabolic Panel   Result Value Ref Range    Sodium 138 136 - 145 mmol/L    Potassium 4.8 3.5 - 5.0 mmol/L    Chloride 103 98 - 107 mmol/L    CO2 27 22 - 31 mmol/L    Anion Gap, Calculation 8 5 - 18 mmol/L    Glucose 96 70 - 125 mg/dL    Calcium 8.9 8.5 - 10.5 mg/dL    BUN 34 (H) 8 - 28 mg/dL    Creatinine 1.05 0.60 - 1.10 mg/dL    GFR MDRD Af Amer >60 >60 mL/min/1.73m2    GFR MDRD Non Af Amer 51 (L) >60 mL/min/1.73m2   HM2(CBC w/o Differential)   Result Value Ref Range    WBC 8.0 4.0 - 11.0 thou/uL    RBC 3.02 (L) 3.80 - 5.40 mill/uL    Hemoglobin 9.2 (L) 12.0 - 16.0 g/dL    Hematocrit 28.4 (L) 35.0 - 47.0 %    MCV 94 80 - 100 fL    MCH 30.5 27.0 - 34.0 pg    MCHC 32.4 32.0 - 36.0 g/dL    RDW 12.7 11.0 - 14.5 %    Platelets 253 140 - 440 thou/uL    MPV 11.2 8.5 - 12.5 fL   Thyroid Stimulating Hormone (TSH)   Result Value Ref Range    TSH 14.89 (H) 0.30 - 5.00 uIU/mL   Vitamin D, Total (25-Hydroxy)   Result Value Ref Range    Vitamin D, Total (25-Hydroxy) 58.0 30.0 - 80.0 ng/mL   Vitamin B12   Result Value Ref Range    Vitamin B-12 860 (H) 213 - 816 pg/mL   Glycosylated Hemoglobin A1c   Result Value Ref Range    Hemoglobin A1c 6.2 (H) <=5.6 %   Basic Metabolic Panel   Result Value Ref Range    Sodium 136 136 - 145 mmol/L    Potassium 4.7 3.5 - 5.0 mmol/L    Chloride 105 98 - 107 mmol/L    CO2 26 22 - 31 mmol/L    Anion Gap, Calculation 5 5 - 18 mmol/L    Glucose 101 70 - 125 mg/dL    Calcium 8.7 8.5 - 10.5 mg/dL    BUN 34 (H) 8 - 28 mg/dL    Creatinine 1.03 0.60 - 1.10 mg/dL    GFR MDRD Af Amer >60 >60 mL/min/1.73m2    GFR MDRD Non Af Amer 52  (L) >60 mL/min/1.73m2   Hepatic Profile   Result Value Ref Range    Bilirubin, Total 0.2 0.0 - 1.0 mg/dL    Bilirubin, Direct 0.2 <=0.5 mg/dL    Protein, Total 5.3 (L) 6.0 - 8.0 g/dL    Albumin 2.7 (L) 3.5 - 5.0 g/dL    Alkaline Phosphatase 32 (L) 45 - 120 U/L    AST 16 0 - 40 U/L    ALT <9 0 - 45 U/L   HM1 (CBC with Diff)   Result Value Ref Range    WBC 6.6 4.0 - 11.0 thou/uL    RBC 2.93 (L) 3.80 - 5.40 mill/uL    Hemoglobin 9.1 (L) 12.0 - 16.0 g/dL    Hematocrit 27.5 (L) 35.0 - 47.0 %    MCV 94 80 - 100 fL    MCH 31.1 27.0 - 34.0 pg    MCHC 33.1 32.0 - 36.0 g/dL    RDW 12.6 11.0 - 14.5 %    Platelets 217 140 - 440 thou/uL    MPV 11.2 8.5 - 12.5 fL    Neutrophils % 51 50 - 70 %    Lymphocytes % 35 20 - 40 %    Monocytes % 9 2 - 10 %    Eosinophils % 4 0 - 6 %    Basophils % 1 0 - 2 %    Immature Granulocyte % 1 (H) <=0 %    Neutrophils Absolute 3.3 2.0 - 7.7 thou/uL    Lymphocytes Absolute 2.3 0.8 - 4.4 thou/uL    Monocytes Absolute 0.6 0.0 - 0.9 thou/uL    Eosinophils Absolute 0.2 0.0 - 0.4 thou/uL    Basophils Absolute 0.1 0.0 - 0.2 thou/uL    Immature Granulocyte Absolute 0.0 <=0.0 thou/uL     Current Outpatient Medications   Medication Sig   ? acetaminophen (TYLENOL) 500 MG tablet Take 1,000 mg by mouth 4 (four) times a day.    ? amLODIPine (NORVASC) 2.5 MG tablet Take 2.5 mg by mouth 2 (two) times a day.   ? aspirin 81 MG EC tablet Take 81 mg by mouth daily.   ? atenoloL (TENORMIN) 50 MG tablet Take 50 mg by mouth 2 (two) times a day.   ? azelastine (ASTEPRO) 0.15 % (205.5 mcg) Spry nasal spray Apply 1 spray into each nostril at bedtime.   ? biotin 1 mg tablet Take 1,000 mcg by mouth daily.   ? cholecalciferol, vitamin D3, 5,000 unit Tab Take 5,000 Units by mouth daily.   ? clotrimazole (LOTRIMIN) 1 % cream Apply 1 application topically daily as needed.   ? famotidine (PEPCID) 40 MG tablet Take 40 mg by mouth daily as needed for heartburn.   ? fenofibrate (TRICOR) 145 MG tablet Take 145 mg by mouth daily.   ?  ferrous sulfate 325 (65 FE) MG tablet Take 1 tablet by mouth daily with breakfast.   ? fexofenadine (ALLEGRA) 180 MG tablet Take 180 mg by mouth daily.   ? fluticasone propionate (FLONASE) 50 mcg/actuation nasal spray Apply 1 spray into each nostril daily.   ? furosemide (LASIX) 20 MG tablet Take 20 mg by mouth daily as needed.   ? gabapentin (NEURONTIN) 100 MG capsule Take 100 mg by mouth every evening.   ? hydrOXYzine pamoate (VISTARIL) 25 MG capsule Take 25 mg by mouth 3 (three) times a day. Give with tylenol   ? insulin glargine U-300 conc 300 unit/mL (3 mL) InPn Inject 5 Units under the skin daily before breakfast.   ? levothyroxine (SYNTHROID, LEVOTHROID) 112 MCG tablet Take 112 mcg by mouth daily. Except for Fridays. And 56 mcg on Friday.   ? lidocaine 4 % patch Place 1 patch on the skin daily. Remove and discard patch with 12 hours. Apply to R knee   ? lisinopriL (PRINIVIL,ZESTRIL) 40 MG tablet Take 40 mg by mouth daily.   ? melatonin 3 mg Tab tablet Take 6 mg by mouth at bedtime.   ? mometasone (NASONEX) 50 mcg/actuation nasal spray 1 spray into each nostril 2 (two) times a day.   ? multivitamin (MULTIPLE VITAMINS ORAL) Take 1 tablet by mouth daily.   ? nitroglycerin (NITROSTAT) 0.4 MG SL tablet Place 0.4 mg under the tongue every 5 (five) minutes as needed for chest pain.   ? nystatin (MYCOSTATIN) cream Apply 1 application topically 2 (two) times a day as needed for dry skin.   ? nystatin (MYCOSTATIN) powder Apply 1 application topically daily. every Thu, Sun for use with colostomy after bath   ? nystatin-triamcinolone (MYCOLOG II) cream Apply 1 application topically daily as needed.   ? ondansetron (ZOFRAN) 4 MG tablet Take 4 mg by mouth every 8 (eight) hours as needed for nausea.   ? oxyCODONE (ROXICODONE) 5 MG immediate release tablet Take 1 tablet (5 mg total) by mouth every 4 (four) hours as needed for pain.   ? pantoprazole (PROTONIX) 40 MG tablet Take 40 mg by mouth 2 (two) times a day.   ?  polyethylene glycol (MIRALAX) 17 gram packet Take 17 g by mouth daily as needed.   ? polyethylene glycol 400 (BLINK TEARS) 0.25 % Drop Apply 1 drop to eye 4 (four) times a day as needed.   ? senna-docusate (SENNOSIDES-DOCUSATE SODIUM) 8.6-50 mg tablet Take 1 tablet by mouth 2 (two) times a day as needed for constipation.   ? SITagliptin (JANUVIA) 50 MG tablet Take 50 mg by mouth daily.   ? sucralfate (CARAFATE) 100 mg/mL suspension Take 1 g by mouth 2 (two) times a day as needed.     ASSESSMENT:      ICD-10-CM    1. Pain management  R52    2. Status post total shoulder arthroplasty, left  Z96.612    3. Anemia, unspecified type  D64.9        PLAN:    Anemia- HGB 9.1, requested to have ferrous sulfate discontinued.     S/P left shoulder arthroplasty- non weight bearing, Pain control, Follow up with Orthopedics, Monitor incision for S/S infection     Pain management - On PRN ES Tylenol and oxycodone, continue gabapentin     Moderate protein calorie malnutrition- Followed by the dietician, weights per facility protocol.     Hypertension-on atenolol, , lisinopril , Norvasc.     Anemia on ferrous sulfate, Last HGB 10.2 on 11/20/20    Type 2 diabetes, Continue FS, on Januvia, Lantus Aic 11/27/20 is 6.2     Allergic Rhinitis on Astepro nasal spray, Allegra,nasonex and Flonase     Hypothyroidism- Continue Synthroid TSH 14.89, Increase Synthroid to 125 mcg and recheck lab in 6 weeks.     GERD on Protonix.Pepcid, and Carafate      Electronically signed by: Luly Ray CNP

## 2021-06-21 NOTE — LETTER
Letter by Dutch Chowdhury DO at      Author: Dutch Chowdhury DO Service: -- Author Type: --    Filed:  Encounter Date: 11/20/2020 Status: (Other)         Patient: Chasity Hodgson   MR Number: [id number]   YOB: 1945   Date of Visit: 11/20/2020     [facility]      Facility:    [facility] [[id number]]  Code Status: UNKNOWN      Chief Complaint/Reason for Visit:  Chief Complaint   Patient presents with   ? H & P     This is a glenohumeral joint, hypertension, diabetes type 2, coronary disease, history of DVT, chief complaint today of nausea and abdominal discomfort intermittent without chest pain or shortness of breath.       HPI:   Chasity is a 75 y.o. female who was recently admitted to the hospital 11/12/2020.  She does have type 2 diabetes which is in decent control at this time with hypertension DVT.  Left shoulder glenohumeral degenerative joint disease was her diagnosis and she underwent left shoulder hemiarthroplasty.  She also went to bicep but biceps tenodesis and there were no perioperative postoperative complications.  She does some constipation which resolved and she does have a colostomy bag.  She is currently on Protonix as well as Carafate at this time and she still has loose stools.  I cannot rule out a bowel obstruction and bowel sounds are faint today.  She says this is happened about once a day usually in the evenings and she has vomited once.  She has no fevers chills or chest pain or shortness of breath.  Her pain is well managed with medications.    Past Medical History:  Past Medical History:   Diagnosis Date   ? Abdominal adhesions    ? Acne rosacea    ? Allergic rhinitis    ? Alopecia    ? Anemia    ? CAD (coronary artery disease)    ? CKD (chronic kidney disease) stage 2, GFR 60-89 ml/min    ? 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)    ? Depression    ? DM2 (diabetes mellitus, type 2) (H)    ? DVT (deep venous thrombosis) (H)    ? GERD  (gastroesophageal reflux disease)    ? HTN (hypertension)    ? Hypertriglyceridemia    ? Hypokalemia    ? Hypothyroidism    ? ANNETTE on CPAP    ? Osteoarthritis of glenohumeral joint    ? Papillary carcinoma of thyroid (H)    ? Poliomyelitis    ? Pulmonary embolus (H)    ? Pulmonary nodule    ? Septic joint of right knee joint (H)    ? Venous insufficiency            Surgical History:  Past Surgical History:   Procedure Laterality Date   ? APPENDECTOMY     ? CARPAL TUNNEL RELEASE     ? CHOLECYSTECTOMY     ? COLOSTOMY     ? FOOT ARTHRODESIS Right     Right foot triple arthrodesis and removal of hardware   ? ROTATOR CUFF REPAIR     ? SHOULDER ARTHROSCOPY  06/25/2015    REVISION SUBACROMIAL DECOMPRESSION, EXCISION OF GANGLION CYST, DEBRIDEMENT AND EXCISION OF THE GLENOHUMERAL JOINT GANGLION CYST, CORACOID DECOMPRESSION, POSSIBLE SUBSCAPULARIS REPAIR AND OPEN SUBSCAPULARIS BICEP   ? SHOULDER SURGERY  11/12/2020    LEFT SHOULDER HEMIARHTROPLASTY, BICEP TENODESIS   ? TENDON RELEASE      foot   ? THYROIDECTOMY     ? TOE AMPUTATION     ? TOTAL ABDOMINAL HYSTERECTOMY         Family History:   History reviewed. No pertinent family history.    Social History:    Social History     Socioeconomic History   ? Marital status:      Spouse name: None   ? Number of children: None   ? Years of education: None   ? Highest education level: None   Occupational History   ? None   Social Needs   ? Financial resource strain: None   ? Food insecurity     Worry: None     Inability: None   ? Transportation needs     Medical: None     Non-medical: None   Tobacco Use   ? Smoking status: Never Smoker   ? Smokeless tobacco: Never Used   Substance and Sexual Activity   ? Alcohol use: Not Currently   ? Drug use: None   ? Sexual activity: None   Lifestyle   ? Physical activity     Days per week: None     Minutes per session: None   ? Stress: None   Relationships   ? Social connections     Talks on phone: None     Gets together: None     Attends  Muslim service: None     Active member of club or organization: None     Attends meetings of clubs or organizations: None     Relationship status: None   ? Intimate partner violence     Fear of current or ex partner: None     Emotionally abused: None     Physically abused: None     Forced sexual activity: None   Other Topics Concern   ? None   Social History Narrative   ? None          Review of Systems   Constitutional:        Positive for nausea and abdominal pain intermittent.  None at this time.  She denies any fevers chills change in vision hearing taste or smell weakness one-sided other.  She has no chest pain or shortness of breath.  She is sleeping okay at night and appetite is okay.  The remainder the view of systems negative is no depression or anxiety and she is urinating without urgency frequency or burning with urination.       Vitals:    11/20/20 1216   BP: 134/83   Pulse: (!) 57   Resp: 16   Temp: 97.3  F (36.3  C)   SpO2: 97%       Physical Exam  Constitutional:       General: She is not in acute distress.     Appearance: She is not toxic-appearing.   HENT:      Head: Normocephalic and atraumatic.      Nose: Nose normal.      Mouth/Throat:      Mouth: Mucous membranes are moist.   Cardiovascular:      Rate and Rhythm: Normal rate and regular rhythm.   Pulmonary:      Effort: Pulmonary effort is normal. No respiratory distress.      Breath sounds: Normal breath sounds.   Abdominal:      General: There is distension.      Comments: And has no rebound or guarding but there is epigastric pain little bit with palpation but is very mild.   Skin:     General: Skin is warm and dry.   Neurological:      Mental Status: She is alert. Mental status is at baseline.         Medication List:  Current Outpatient Medications   Medication Sig   ? acetaminophen (TYLENOL) 500 MG tablet Take 1,000 mg by mouth every 6 (six) hours as needed for pain.   ? amLODIPine (NORVASC) 2.5 MG tablet Take 2.5 mg by mouth 2 (two)  times a day.   ? aspirin 81 MG EC tablet Take 81 mg by mouth daily.   ? atenoloL (TENORMIN) 50 MG tablet Take 50 mg by mouth 2 (two) times a day.   ? azelastine (ASTEPRO) 0.15 % (205.5 mcg) Spry nasal spray Apply 1 spray into each nostril at bedtime.   ? biotin 1 mg tablet Take 1,000 mcg by mouth daily.   ? cholecalciferol, vitamin D3, 5,000 unit Tab Take 5,000 Units by mouth daily.   ? clotrimazole (LOTRIMIN) 1 % cream Apply 1 application topically daily as needed.   ? diphenhydrAMINE-acetaminophen (TYLENOL PM)  mg Tab Take 1 tablet by mouth at bedtime as needed.   ? famotidine (PEPCID) 40 MG tablet Take 40 mg by mouth daily as needed for heartburn.   ? fenofibrate (TRICOR) 145 MG tablet Take 145 mg by mouth daily.   ? ferrous sulfate 325 (65 FE) MG tablet Take 1 tablet by mouth daily with breakfast.   ? fexofenadine (ALLEGRA) 180 MG tablet Take 180 mg by mouth daily.   ? fluticasone propionate (FLONASE) 50 mcg/actuation nasal spray Apply 1 spray into each nostril daily.   ? furosemide (LASIX) 20 MG tablet Take 20 mg by mouth daily as needed.   ? gabapentin (NEURONTIN) 100 MG capsule Take 100 mg by mouth every evening.   ? insulin glargine U-300 conc 300 unit/mL (3 mL) InPn Inject 5 Units under the skin daily before breakfast.   ? levothyroxine (SYNTHROID, LEVOTHROID) 112 MCG tablet Take 112 mcg by mouth daily. Except for Fridays. And 56 mcg on Friday.   ? lisinopriL (PRINIVIL,ZESTRIL) 40 MG tablet Take 40 mg by mouth daily.   ? mometasone (NASONEX) 50 mcg/actuation nasal spray 1 spray into each nostril 2 (two) times a day.   ? multivitamin (MULTIPLE VITAMINS ORAL) Take 1 tablet by mouth daily.   ? nitroglycerin (NITROSTAT) 0.4 MG SL tablet Place 0.4 mg under the tongue every 5 (five) minutes as needed for chest pain.   ? nystatin (MYCOSTATIN) cream Apply 1 application topically 2 (two) times a day as needed for dry skin.   ? nystatin (MYCOSTATIN) powder Apply 1 application topically daily. every Thu, Sun for  use with colostomy after bath   ? nystatin-triamcinolone (MYCOLOG II) cream Apply 1 application topically daily as needed.   ? ondansetron (ZOFRAN) 4 MG tablet Take 4 mg by mouth every 8 (eight) hours as needed for nausea.   ? oxyCODONE (ROXICODONE) 5 MG immediate release tablet Take 1 tablet (5 mg total) by mouth every 4 (four) hours as needed for pain.   ? pantoprazole (PROTONIX) 40 MG tablet Take 40 mg by mouth 2 (two) times a day.   ? polyethylene glycol (MIRALAX) 17 gram packet Take 17 g by mouth daily as needed.   ? polyethylene glycol 400 (BLINK TEARS) 0.25 % Drop Apply 1 drop to eye 4 (four) times a day as needed.   ? senna-docusate (SENNOSIDES-DOCUSATE SODIUM) 8.6-50 mg tablet Take 1 tablet by mouth 2 (two) times a day as needed for constipation.   ? SITagliptin (JANUVIA) 50 MG tablet Take 50 mg by mouth daily.   ? sucralfate (CARAFATE) 100 mg/mL suspension Take 1 g by mouth 2 (two) times a day as needed.       Labs: Pending today.      Assessment:    ICD-10-CM    1. Primary osteoarthritis of left shoulder  M19.012    2. Pain management  R52    3. Nausea  R11.0    4. Epigastric pain  R10.13    5. Type 2 diabetes mellitus with other specified complication, unspecified whether long term insulin use (H)  E11.69        Plan: Plan at this time will continue physical and occupational therapy and pain management as ordered.  I did review her blood sugars and they are acceptable at this time with no changes will be made.  We will get an abdominal flat and upright secondary to pain constipation in her abdomen and LFTs lipase hemoglobin done stat today secondary abdominal pain.  The rational for the hemoglobin case she is having some kind of a bleed to monitor that.  I cannot rule out anything else at this time until I have those back.  Basic metabolic profile done today stat secondary to loose stools through her colostomy that she said is much looser and watery than normal.  She will continue on the Zofran the  sucralfate as well as the remainder of her medications without changes.  I will continue to monitor above medical problems and no other changes to care plan at this time.        Electronically signed by: Dutch Chowdhury DO

## 2021-06-21 NOTE — LETTER
Letter by Luly Ray CNP at      Author: Luly Ray CNP Service: -- Author Type: --    Filed:  Encounter Date: 11/19/2020 Status: (Other)         Patient: Chasity Hodgson   MR Number: [id number]   YOB: 1945   Date of Visit: 11/19/2020     [facility]    Facility:   [facility] [[id number]]   Code Status: FULL CODE      CHIEF COMPLAINT/REASON FOR VISIT:  Chief Complaint   Patient presents with   ? Review Of Multiple Medical Conditions     malnutrition F/U left shoulder hemiarthroplasty, pain control        HISTORY:      HPI: Chasity is a 75 y.o. female undergoing physical and occupational therapy at [facility]. She is  with a history of DM2, CAD, hypothyroid, DVT/PE, HTN who was admitted 11/12/2020 for operative management of left shoulder glenohumeral degenerative joint disease.  On 11/12/20 the patient underwent left shoulder hemiarthroplasty, biceps tenodesis.    Today she is seen in her room to review multiple medical issues and to establish care.  She denied chest pain or shortness of breath.  Denied constipation or diarrhea she is with a colostomy.  She reports she has had this colostomy for approximately 8 years.  She rates her left shoulder pain 9 out of 10 but took a pain pill and it came down to 3-4 out of 10.  She is being followed by the dietitian and  has a new diagnosis of moderate protein calorie malnutrition.  She denies cough or congestion.  She requested to use her  home Salonpas patches for her right knee OA as needed.    Past Medical History:   Diagnosis Date   ? Abdominal adhesions    ? Acne rosacea    ? Allergic rhinitis    ? Alopecia    ? Anemia    ? CAD (coronary artery disease)    ? CKD (chronic kidney disease) stage 2, GFR 60-89 ml/min    ? 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)    ? Depression    ? DM2 (diabetes mellitus, type 2) (H)    ? DVT (deep venous thrombosis) (H)    ? GERD (gastroesophageal reflux disease)    ? HTN  (hypertension)    ? Hypertriglyceridemia    ? Hypokalemia    ? Hypothyroidism    ? ANNETTE on CPAP    ? Osteoarthritis of glenohumeral joint    ? Papillary carcinoma of thyroid (H)    ? Poliomyelitis    ? Pulmonary embolus (H)    ? Pulmonary nodule    ? Septic joint of right knee joint (H)    ? Venous insufficiency              No family history on file.  Social History     Socioeconomic History   ? Marital status:      Spouse name: Not on file   ? Number of children: Not on file   ? Years of education: Not on file   ? Highest education level: Not on file   Occupational History   ? Not on file   Social Needs   ? Financial resource strain: Not on file   ? Food insecurity     Worry: Not on file     Inability: Not on file   ? Transportation needs     Medical: Not on file     Non-medical: Not on file   Tobacco Use   ? Smoking status: Never Smoker   ? Smokeless tobacco: Never Used   Substance and Sexual Activity   ? Alcohol use: Not Currently   ? Drug use: Not on file   ? Sexual activity: Not on file   Lifestyle   ? Physical activity     Days per week: Not on file     Minutes per session: Not on file   ? Stress: Not on file   Relationships   ? Social connections     Talks on phone: Not on file     Gets together: Not on file     Attends Confucianist service: Not on file     Active member of club or organization: Not on file     Attends meetings of clubs or organizations: Not on file     Relationship status: Not on file   ? Intimate partner violence     Fear of current or ex partner: Not on file     Emotionally abused: Not on file     Physically abused: Not on file     Forced sexual activity: Not on file   Other Topics Concern   ? Not on file   Social History Narrative   ? Not on file         Review of Systems   Constitutional: Positive for activity change. Negative for appetite change, fatigue and fever.        Nonweightbearing left upper extremity   HENT: Negative for congestion.    Eyes: Positive for visual disturbance.         Wears glasses   Respiratory: Negative for cough, shortness of breath and wheezing.         Patient with allergic rhinitis on multiple medications   Cardiovascular: Negative for chest pain and leg swelling.   Gastrointestinal: Negative for abdominal distention, abdominal pain, constipation, diarrhea and nausea.        Pt with a colostomy - she reported she had it 8 years    Genitourinary: Negative for dysuria.   Musculoskeletal: Positive for arthralgias. Negative for back pain.        Reports right knee arthritis and uses salonapa patches PRN    Skin: Positive for wound. Negative for color change.   Neurological: Negative for dizziness.   Psychiatric/Behavioral: Negative for agitation, behavioral problems and confusion.       Vitals:    11/19/20 0744   BP: 137/48   Pulse: 61   Resp: 18   Temp: 97.8  F (36.6  C)   SpO2: 96%   Weight: 184 lb 9.6 oz (83.7 kg)       Physical Exam  Constitutional:       Appearance: She is well-developed.      Comments: Pleasant woman in on acute distress    HENT:      Head: Normocephalic.   Eyes:      Conjunctiva/sclera: Conjunctivae normal.   Neck:      Musculoskeletal: Normal range of motion.   Cardiovascular:      Rate and Rhythm: Normal rate and regular rhythm.      Heart sounds: Normal heart sounds. No murmur.   Pulmonary:      Effort: No respiratory distress.      Breath sounds: Normal breath sounds. No wheezing or rales.   Abdominal:      General: Bowel sounds are normal. There is no distension.      Palpations: Abdomen is soft.      Tenderness: There is no abdominal tenderness.   Musculoskeletal: Normal range of motion.      Comments: Steri strips left shoulder    Skin:     General: Skin is warm.   Neurological:      Mental Status: She is alert and oriented to person, place, and time.   Psychiatric:         Behavior: Behavior normal.           LABS:   Recent Results (from the past 240 hour(s))   Basic Metabolic Panel   Result Value Ref Range    Sodium 134 (L) 136 - 145  mmol/L    Potassium 5.6 (H) 3.5 - 5.0 mmol/L    Chloride 101 98 - 107 mmol/L    CO2 26 22 - 31 mmol/L    Anion Gap, Calculation 7 5 - 18 mmol/L    Glucose 125 70 - 125 mg/dL    Calcium 8.6 8.5 - 10.5 mg/dL    BUN 35 (H) 8 - 28 mg/dL    Creatinine 1.15 (H) 0.60 - 1.10 mg/dL    GFR MDRD Af Amer 56 (L) >60 mL/min/1.73m2    GFR MDRD Non Af Amer 46 (L) >60 mL/min/1.73m2   Hepatic Profile   Result Value Ref Range    Bilirubin, Total 0.3 0.0 - 1.0 mg/dL    Bilirubin, Direct 0.1 <=0.5 mg/dL    Protein, Total 5.3 (L) 6.0 - 8.0 g/dL    Albumin 2.7 (L) 3.5 - 5.0 g/dL    Alkaline Phosphatase 45 45 - 120 U/L    AST 18 0 - 40 U/L    ALT 10 0 - 45 U/L   Lipase   Result Value Ref Range    Lipase 15 0 - 52 U/L   Hemoglobin   Result Value Ref Range    Hemoglobin 10.2 (L) 12.0 - 16.0 g/dL     Current Outpatient Medications   Medication Sig   ? acetaminophen (TYLENOL) 500 MG tablet Take 1,000 mg by mouth every 6 (six) hours as needed for pain.   ? amLODIPine (NORVASC) 2.5 MG tablet Take 2.5 mg by mouth 2 (two) times a day.   ? aspirin 81 MG EC tablet Take 81 mg by mouth daily.   ? atenoloL (TENORMIN) 50 MG tablet Take 50 mg by mouth 2 (two) times a day.   ? azelastine (ASTEPRO) 0.15 % (205.5 mcg) Spry nasal spray Apply 1 spray into each nostril at bedtime.   ? biotin 1 mg tablet Take 1,000 mcg by mouth daily.   ? cholecalciferol, vitamin D3, 5,000 unit Tab Take 5,000 Units by mouth daily.   ? clotrimazole (LOTRIMIN) 1 % cream Apply 1 application topically daily as needed.   ? diphenhydrAMINE-acetaminophen (TYLENOL PM)  mg Tab Take 1 tablet by mouth at bedtime as needed.   ? famotidine (PEPCID) 40 MG tablet Take 40 mg by mouth daily as needed for heartburn.   ? fenofibrate (TRICOR) 145 MG tablet Take 145 mg by mouth daily.   ? ferrous sulfate 325 (65 FE) MG tablet Take 1 tablet by mouth daily with breakfast.   ? fexofenadine (ALLEGRA) 180 MG tablet Take 180 mg by mouth daily.   ? fluticasone propionate (FLONASE) 50 mcg/actuation  nasal spray Apply 1 spray into each nostril daily.   ? furosemide (LASIX) 20 MG tablet Take 20 mg by mouth daily as needed.   ? gabapentin (NEURONTIN) 100 MG capsule Take 100 mg by mouth every evening.   ? insulin glargine U-300 conc 300 unit/mL (3 mL) InPn Inject 5 Units under the skin daily before breakfast.   ? levothyroxine (SYNTHROID, LEVOTHROID) 112 MCG tablet Take 112 mcg by mouth daily. Except for Fridays. And 56 mcg on Friday.   ? lisinopriL (PRINIVIL,ZESTRIL) 40 MG tablet Take 40 mg by mouth daily.   ? mometasone (NASONEX) 50 mcg/actuation nasal spray 1 spray into each nostril 2 (two) times a day.   ? multivitamin (MULTIPLE VITAMINS ORAL) Take 1 tablet by mouth daily.   ? nitroglycerin (NITROSTAT) 0.4 MG SL tablet Place 0.4 mg under the tongue every 5 (five) minutes as needed for chest pain.   ? nystatin (MYCOSTATIN) cream Apply 1 application topically 2 (two) times a day as needed for dry skin.   ? nystatin (MYCOSTATIN) powder Apply 1 application topically daily. every Thu, Sun for use with colostomy after bath   ? nystatin-triamcinolone (MYCOLOG II) cream Apply 1 application topically daily as needed.   ? ondansetron (ZOFRAN) 4 MG tablet Take 4 mg by mouth every 8 (eight) hours as needed for nausea.   ? oxyCODONE (ROXICODONE) 5 MG immediate release tablet Take 1 tablet (5 mg total) by mouth every 4 (four) hours as needed for pain.   ? pantoprazole (PROTONIX) 40 MG tablet Take 40 mg by mouth 2 (two) times a day.   ? polyethylene glycol (MIRALAX) 17 gram packet Take 17 g by mouth daily as needed.   ? polyethylene glycol 400 (BLINK TEARS) 0.25 % Drop Apply 1 drop to eye 4 (four) times a day as needed.   ? senna-docusate (SENNOSIDES-DOCUSATE SODIUM) 8.6-50 mg tablet Take 1 tablet by mouth 2 (two) times a day as needed for constipation.   ? SITagliptin (JANUVIA) 50 MG tablet Take 50 mg by mouth daily.   ? sucralfate (CARAFATE) 100 mg/mL suspension Take 1 g by mouth 2 (two) times a day as needed.      ASSESSMENT:      ICD-10-CM    1. Status post total shoulder arthroplasty, left  Z96.612    2. Pain management  R52    3. Moderate protein-calorie malnutrition (H)  E44.0        PLAN:      S/P left shoulder arthroplasty- non weight bearing, Pain control, Follow up with Orthopedics, Monitor incision for S/S infection     Pain management - On PRN ES Tylenol and oxycodone, continue gabapentin     Moderate protein calorie malnutrition- Followed by the dietician, weights per facility protocol.     Hypertension-on atenolol, , lisinopril , Norvasc.     Anemia on ferrous sulfate, Last HGB 10.2 on 11/20/20    Type 2 diabetes, Continue FS, on Januvia, Lantus Aic 9/19 was 6.8- Lab pending     Allergic Rhinitis on Astepro nasal spray, Allegra,nasonex and Flonase     Hypothyroidism- Continue Synthroid no recent TSH, lab pending     GERD on Protonix.Pepcid, and Carafate      Electronically signed by: Luly Ray CNP

## 2021-06-21 NOTE — LETTER
Letter by Luly Ray CNP at      Author: Luly Ray CNP Service: -- Author Type: --    Filed:  Encounter Date: 11/27/2020 Status: (Other)         Patient: Chasity Hodgson   MR Number: [id number]   YOB: 1945   Date of Visit: 11/27/2020     [facility]    Facility:   [facility] [[id number]]   Code Status: FULL CODE      CHIEF COMPLAINT/REASON FOR VISIT:  Chief Complaint   Patient presents with   ? Review Of Multiple Medical Conditions     F/U left shoulder hemiarthrioplasty. pain control       HISTORY:      HPI: Chasity is a 75 y.o. female undergoing physical and occupational therapy at [facility]. She is  with a history of DM2, CAD, hypothyroid, DVT/PE, HTN who was admitted 11/12/2020 for operative management of left shoulder glenohumeral degenerative joint disease.  On 11/12/20 the patient underwent left shoulder hemiarthroplasty, biceps tenodesis.    Today she is seen for  follow up left shoulder arthroplasty, review labs  and pain management. Surgical incision C/D/I. Steri strips in place. She reports having some numbness in her hand yesterday but it has resolved.  Her pain is controlled.  She denied chest pain or shortness of breath.  Denied constipation or diarrhea.  She is with a colostomy.    She is being followed by the dietitian. PO intake fair.   She was noted to have an elevated BP but in review it was an isolated reading. B12 slightly elevated, will check BMP and LFT's    Past Medical History:   Diagnosis Date   ? Abdominal adhesions    ? Acne rosacea    ? Allergic rhinitis    ? Alopecia    ? Anemia    ? CAD (coronary artery disease)    ? CKD (chronic kidney disease) stage 2, GFR 60-89 ml/min    ? 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)    ? Depression    ? DM2 (diabetes mellitus, type 2) (H)    ? DVT (deep venous thrombosis) (H)    ? GERD (gastroesophageal reflux disease)    ? HTN (hypertension)    ? Hypertriglyceridemia    ? Hypokalemia    ?  Hypothyroidism    ? ANNETTE on CPAP    ? Osteoarthritis of glenohumeral joint    ? Papillary carcinoma of thyroid (H)    ? Poliomyelitis    ? Pulmonary embolus (H)    ? Pulmonary nodule    ? Septic joint of right knee joint (H)    ? Venous insufficiency              No family history on file.  Social History     Socioeconomic History   ? Marital status:      Spouse name: Not on file   ? Number of children: Not on file   ? Years of education: Not on file   ? Highest education level: Not on file   Occupational History   ? Not on file   Social Needs   ? Financial resource strain: Not on file   ? Food insecurity     Worry: Not on file     Inability: Not on file   ? Transportation needs     Medical: Not on file     Non-medical: Not on file   Tobacco Use   ? Smoking status: Never Smoker   ? Smokeless tobacco: Never Used   Substance and Sexual Activity   ? Alcohol use: Not Currently   ? Drug use: Not on file   ? Sexual activity: Not on file   Lifestyle   ? Physical activity     Days per week: Not on file     Minutes per session: Not on file   ? Stress: Not on file   Relationships   ? Social connections     Talks on phone: Not on file     Gets together: Not on file     Attends Yazidism service: Not on file     Active member of club or organization: Not on file     Attends meetings of clubs or organizations: Not on file     Relationship status: Not on file   ? Intimate partner violence     Fear of current or ex partner: Not on file     Emotionally abused: Not on file     Physically abused: Not on file     Forced sexual activity: Not on file   Other Topics Concern   ? Not on file   Social History Narrative   ? Not on file         Review of Systems   Constitutional: Positive for activity change. Negative for appetite change, fatigue and fever.        Nonweightbearing left upper extremity   HENT: Negative for congestion.    Eyes: Positive for visual disturbance.        Wears glasses   Respiratory: Negative for cough,  shortness of breath and wheezing.         Patient with allergic rhinitis on multiple medications   Cardiovascular: Negative for chest pain and leg swelling.   Gastrointestinal: Negative for abdominal distention, abdominal pain, constipation, diarrhea and nausea.        Pt with a colostomy - she reported she had it 8 years    Genitourinary: Negative for dysuria.   Musculoskeletal: Positive for arthralgias. Negative for back pain.        Reports right knee arthritis and uses salonapa patches PRN    Skin: Positive for wound. Negative for color change.   Neurological: Negative for dizziness.   Psychiatric/Behavioral: Negative for agitation, behavioral problems and confusion.       Vitals:    11/26/20 1405   BP: (!) 185/62   Pulse: 65   Resp: 16   Temp: 97.3  F (36.3  C)   SpO2: 97%   Weight: 181 lb 14.4 oz (82.5 kg)       Physical Exam  Constitutional:       Appearance: She is well-developed.      Comments: Pleasant woman in on acute distress    HENT:      Head: Normocephalic.   Eyes:      Conjunctiva/sclera: Conjunctivae normal.   Neck:      Musculoskeletal: Normal range of motion.   Cardiovascular:      Rate and Rhythm: Normal rate and regular rhythm.      Heart sounds: Normal heart sounds. No murmur.   Pulmonary:      Effort: No respiratory distress.      Breath sounds: Normal breath sounds. No wheezing or rales.   Abdominal:      General: Bowel sounds are normal. There is no distension.      Palpations: Abdomen is soft.      Tenderness: There is no abdominal tenderness.   Musculoskeletal: Normal range of motion.      Comments: Steri strips left shoulder    Skin:     General: Skin is warm.   Neurological:      Mental Status: She is alert and oriented to person, place, and time.   Psychiatric:         Behavior: Behavior normal.           LABS:   Recent Results (from the past 240 hour(s))   Basic Metabolic Panel   Result Value Ref Range    Sodium 134 (L) 136 - 145 mmol/L    Potassium 5.6 (H) 3.5 - 5.0 mmol/L     Chloride 101 98 - 107 mmol/L    CO2 26 22 - 31 mmol/L    Anion Gap, Calculation 7 5 - 18 mmol/L    Glucose 125 70 - 125 mg/dL    Calcium 8.6 8.5 - 10.5 mg/dL    BUN 35 (H) 8 - 28 mg/dL    Creatinine 1.15 (H) 0.60 - 1.10 mg/dL    GFR MDRD Af Amer 56 (L) >60 mL/min/1.73m2    GFR MDRD Non Af Amer 46 (L) >60 mL/min/1.73m2   Hepatic Profile   Result Value Ref Range    Bilirubin, Total 0.3 0.0 - 1.0 mg/dL    Bilirubin, Direct 0.1 <=0.5 mg/dL    Protein, Total 5.3 (L) 6.0 - 8.0 g/dL    Albumin 2.7 (L) 3.5 - 5.0 g/dL    Alkaline Phosphatase 45 45 - 120 U/L    AST 18 0 - 40 U/L    ALT 10 0 - 45 U/L   Lipase   Result Value Ref Range    Lipase 15 0 - 52 U/L   Hemoglobin   Result Value Ref Range    Hemoglobin 10.2 (L) 12.0 - 16.0 g/dL   Basic Metabolic Panel   Result Value Ref Range    Sodium 137 136 - 145 mmol/L    Potassium 5.7 (H) 3.5 - 5.0 mmol/L    Chloride 103 98 - 107 mmol/L    CO2 26 22 - 31 mmol/L    Anion Gap, Calculation 8 5 - 18 mmol/L    Glucose 139 (H) 70 - 125 mg/dL    Calcium 8.9 8.5 - 10.5 mg/dL    BUN 39 (H) 8 - 28 mg/dL    Creatinine 1.13 (H) 0.60 - 1.10 mg/dL    GFR MDRD Af Amer 57 (L) >60 mL/min/1.73m2    GFR MDRD Non Af Amer 47 (L) >60 mL/min/1.73m2   Basic Metabolic Panel   Result Value Ref Range    Sodium 138 136 - 145 mmol/L    Potassium 4.8 3.5 - 5.0 mmol/L    Chloride 103 98 - 107 mmol/L    CO2 27 22 - 31 mmol/L    Anion Gap, Calculation 8 5 - 18 mmol/L    Glucose 96 70 - 125 mg/dL    Calcium 8.9 8.5 - 10.5 mg/dL    BUN 34 (H) 8 - 28 mg/dL    Creatinine 1.05 0.60 - 1.10 mg/dL    GFR MDRD Af Amer >60 >60 mL/min/1.73m2    GFR MDRD Non Af Amer 51 (L) >60 mL/min/1.73m2   HM2(CBC w/o Differential)   Result Value Ref Range    WBC 8.0 4.0 - 11.0 thou/uL    RBC 3.02 (L) 3.80 - 5.40 mill/uL    Hemoglobin 9.2 (L) 12.0 - 16.0 g/dL    Hematocrit 28.4 (L) 35.0 - 47.0 %    MCV 94 80 - 100 fL    MCH 30.5 27.0 - 34.0 pg    MCHC 32.4 32.0 - 36.0 g/dL    RDW 12.7 11.0 - 14.5 %    Platelets 253 140 - 440 thou/uL    MPV  11.2 8.5 - 12.5 fL   Thyroid Stimulating Hormone (TSH)   Result Value Ref Range    TSH 14.89 (H) 0.30 - 5.00 uIU/mL   Vitamin B12   Result Value Ref Range    Vitamin B-12 860 (H) 213 - 816 pg/mL   Glycosylated Hemoglobin A1c   Result Value Ref Range    Hemoglobin A1c 6.2 (H) <=5.6 %     Current Outpatient Medications   Medication Sig   ? acetaminophen (TYLENOL) 500 MG tablet Take 1,000 mg by mouth 4 (four) times a day.    ? amLODIPine (NORVASC) 2.5 MG tablet Take 2.5 mg by mouth 2 (two) times a day.   ? aspirin 81 MG EC tablet Take 81 mg by mouth daily.   ? atenoloL (TENORMIN) 50 MG tablet Take 50 mg by mouth 2 (two) times a day.   ? azelastine (ASTEPRO) 0.15 % (205.5 mcg) Spry nasal spray Apply 1 spray into each nostril at bedtime.   ? biotin 1 mg tablet Take 1,000 mcg by mouth daily.   ? cholecalciferol, vitamin D3, 5,000 unit Tab Take 5,000 Units by mouth daily.   ? clotrimazole (LOTRIMIN) 1 % cream Apply 1 application topically daily as needed.   ? famotidine (PEPCID) 40 MG tablet Take 40 mg by mouth daily as needed for heartburn.   ? fenofibrate (TRICOR) 145 MG tablet Take 145 mg by mouth daily.   ? ferrous sulfate 325 (65 FE) MG tablet Take 1 tablet by mouth daily with breakfast.   ? fexofenadine (ALLEGRA) 180 MG tablet Take 180 mg by mouth daily.   ? fluticasone propionate (FLONASE) 50 mcg/actuation nasal spray Apply 1 spray into each nostril daily.   ? furosemide (LASIX) 20 MG tablet Take 20 mg by mouth daily as needed.   ? gabapentin (NEURONTIN) 100 MG capsule Take 100 mg by mouth every evening.   ? hydrOXYzine pamoate (VISTARIL) 25 MG capsule Take 25 mg by mouth 3 (three) times a day. Give with tylenol   ? insulin glargine U-300 conc 300 unit/mL (3 mL) InPn Inject 5 Units under the skin daily before breakfast.   ? levothyroxine (SYNTHROID, LEVOTHROID) 112 MCG tablet Take 112 mcg by mouth daily. Except for Fridays. And 56 mcg on Friday.   ? lidocaine 4 % patch Place 1 patch on the skin daily. Remove and  discard patch with 12 hours. Apply to R knee   ? lisinopriL (PRINIVIL,ZESTRIL) 40 MG tablet Take 40 mg by mouth daily.   ? melatonin 3 mg Tab tablet Take 6 mg by mouth at bedtime.   ? mometasone (NASONEX) 50 mcg/actuation nasal spray 1 spray into each nostril 2 (two) times a day.   ? multivitamin (MULTIPLE VITAMINS ORAL) Take 1 tablet by mouth daily.   ? nitroglycerin (NITROSTAT) 0.4 MG SL tablet Place 0.4 mg under the tongue every 5 (five) minutes as needed for chest pain.   ? nystatin (MYCOSTATIN) cream Apply 1 application topically 2 (two) times a day as needed for dry skin.   ? nystatin (MYCOSTATIN) powder Apply 1 application topically daily. every Thu, Sun for use with colostomy after bath   ? nystatin-triamcinolone (MYCOLOG II) cream Apply 1 application topically daily as needed.   ? ondansetron (ZOFRAN) 4 MG tablet Take 4 mg by mouth every 8 (eight) hours as needed for nausea.   ? oxyCODONE (ROXICODONE) 5 MG immediate release tablet Take 1 tablet (5 mg total) by mouth every 4 (four) hours as needed for pain.   ? pantoprazole (PROTONIX) 40 MG tablet Take 40 mg by mouth 2 (two) times a day.   ? polyethylene glycol (MIRALAX) 17 gram packet Take 17 g by mouth daily as needed.   ? polyethylene glycol 400 (BLINK TEARS) 0.25 % Drop Apply 1 drop to eye 4 (four) times a day as needed.   ? senna-docusate (SENNOSIDES-DOCUSATE SODIUM) 8.6-50 mg tablet Take 1 tablet by mouth 2 (two) times a day as needed for constipation.   ? SITagliptin (JANUVIA) 50 MG tablet Take 50 mg by mouth daily.   ? sucralfate (CARAFATE) 100 mg/mL suspension Take 1 g by mouth 2 (two) times a day as needed.     ASSESSMENT:      ICD-10-CM    1. Pain management  R52    2. Status post total replacement of left shoulder  Z96.612    3. Secondary hypertension  I15.9        PLAN:      S/P left shoulder arthroplasty- non weight bearing, Pain control, Follow up with Orthopedics, Monitor incision for S/S infection     Pain management - On PRN ES Tylenol and  oxycodone, continue gabapentin     Moderate protein calorie malnutrition- Followed by the dietician, weights per facility protocol.     Hypertension-on atenolol, , lisinopril , Norvasc.     Anemia on ferrous sulfate, Last HGB 10.2 on 11/20/20    Type 2 diabetes, Continue FS, on Januvia, Lantus Aic 11/27/20 is 6.2     Allergic Rhinitis on Astepro nasal spray, Allegra,nasonex and Flonase     Hypothyroidism- Continue Synthroid TSH 14.89, Increase Synthroid to 125 mcg and recheck lab in 6 weeks.     GERD on Protonix.Pepcid, and Carafate      Electronically signed by: Luly Ray CNP

## 2021-06-21 NOTE — LETTER
Letter by Leonides Acuna NP at      Author: Leonides Acuna NP Service: -- Author Type: --    Filed:  Encounter Date: 12/10/2020 Status: (Other)         Patient: Chasity Hodgson   MR Number: [id number]   YOB: 1945   Date of Visit: 12/10/2020     [facility]      Facility:    [facility] [[id number]]  Code Status: FULL CODE      Chief Complaint/Reason for Visit:  313  Chief Complaint   Patient presents with   ? Discharge Summary       HPI:   Chasity is a 75 y.o. female who is a transfer from [facility] with an admission on 11/12/20 and discharge on 11/17/20. She has a PMH of DM2, CAD, hypothyroidism, DVT/PE, Htn, who underwent operative management of a left shouder glenohumeral degenerative joint disease with a hemiarthroplasty and biceps tenodesis. Postoperatively no issues other than some nausea which was remedied. She was given WBAT and tylenol/oxycodone for pain control. She was then discharged to the TCU.    She has concluded her TCU stay and will be discharged to home with services on 12/12/20.    Past Medical History:  Past Medical History:   Diagnosis Date   ? Abdominal adhesions    ? Acne rosacea    ? Allergic rhinitis    ? Alopecia    ? Anemia    ? CAD (coronary artery disease)    ? CKD (chronic kidney disease) stage 2, GFR 60-89 ml/min    ? 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)    ? Depression    ? DM2 (diabetes mellitus, type 2) (H)    ? DVT (deep venous thrombosis) (H)    ? GERD (gastroesophageal reflux disease)    ? HTN (hypertension)    ? Hypertriglyceridemia    ? Hypokalemia    ? Hypothyroidism    ? ANNETTE on CPAP    ? Osteoarthritis of glenohumeral joint    ? Papillary carcinoma of thyroid (H)    ? Poliomyelitis    ? Pulmonary embolus (H)    ? Pulmonary nodule    ? Septic joint of right knee joint (H)    ? Venous insufficiency            Surgical History:  Past Surgical History:   Procedure Laterality Date   ? APPENDECTOMY     ? CARPAL TUNNEL  RELEASE     ? CHOLECYSTECTOMY     ? COLOSTOMY     ? FOOT ARTHRODESIS Right     Right foot triple arthrodesis and removal of hardware   ? ROTATOR CUFF REPAIR     ? SHOULDER ARTHROSCOPY  06/25/2015    REVISION SUBACROMIAL DECOMPRESSION, EXCISION OF GANGLION CYST, DEBRIDEMENT AND EXCISION OF THE GLENOHUMERAL JOINT GANGLION CYST, CORACOID DECOMPRESSION, POSSIBLE SUBSCAPULARIS REPAIR AND OPEN SUBSCAPULARIS BICEP   ? SHOULDER SURGERY  11/12/2020    LEFT SHOULDER HEMIARHTROPLASTY, BICEP TENODESIS   ? TENDON RELEASE      foot   ? THYROIDECTOMY     ? TOE AMPUTATION     ? TOTAL ABDOMINAL HYSTERECTOMY         Family History:   No family history on file.    Social History:    Social History     Socioeconomic History   ? Marital status:      Spouse name: Not on file   ? Number of children: Not on file   ? Years of education: Not on file   ? Highest education level: Not on file   Occupational History   ? Not on file   Social Needs   ? Financial resource strain: Not on file   ? Food insecurity     Worry: Not on file     Inability: Not on file   ? Transportation needs     Medical: Not on file     Non-medical: Not on file   Tobacco Use   ? Smoking status: Never Smoker   ? Smokeless tobacco: Never Used   Substance and Sexual Activity   ? Alcohol use: Not Currently   ? Drug use: Not on file   ? Sexual activity: Not on file   Lifestyle   ? Physical activity     Days per week: Not on file     Minutes per session: Not on file   ? Stress: Not on file   Relationships   ? Social connections     Talks on phone: Not on file     Gets together: Not on file     Attends Protestant service: Not on file     Active member of club or organization: Not on file     Attends meetings of clubs or organizations: Not on file     Relationship status: Not on file   ? Intimate partner violence     Fear of current or ex partner: Not on file     Emotionally abused: Not on file     Physically abused: Not on file     Forced sexual activity: Not on file    Other Topics Concern   ? Not on file   Social History Narrative   ? Not on file          Review of Systems   Constitutional: Positive for activity change and fatigue. Negative for appetite change, chills and diaphoresis.        Still has pain in L UE   HENT: Negative for congestion and hearing loss.    Eyes: Negative.    Cardiovascular: Positive for leg swelling.   Gastrointestinal: Negative for abdominal distention, abdominal pain, blood in stool, constipation, diarrhea and nausea.   Endocrine: Negative.    Genitourinary: Negative for difficulty urinating.   Musculoskeletal:        L UE   Skin:        L shoulder   Allergic/Immunologic: Negative.    Neurological: Negative for dizziness, tremors, speech difficulty and light-headedness.   Hematological: Negative.    Psychiatric/Behavioral: Negative for agitation, confusion, hallucinations and sleep disturbance. The patient is not nervous/anxious.        Vitals:    12/10/20 0753   BP: 119/47   Pulse: 60   Resp: 16   Temp: 97  F (36.1  C)   SpO2: 98%   Weight: 180 lb (81.6 kg)       Physical Exam  Vitals signs reviewed.   Constitutional:       Appearance: She is obese.   HENT:      Head: Normocephalic.      Right Ear: External ear normal.      Left Ear: External ear normal.      Nose: No congestion.      Mouth/Throat:      Pharynx: No oropharyngeal exudate.   Eyes:      General:         Right eye: No discharge.         Left eye: No discharge.   Neck:      Musculoskeletal: Normal range of motion.   Cardiovascular:      Rate and Rhythm: Normal rate.      Pulses: Normal pulses.      Heart sounds: No murmur.   Pulmonary:      Effort: Pulmonary effort is normal. No respiratory distress.      Comments: RA, CTA  Abdominal:      General: There is no distension.      Palpations: Abdomen is soft.      Tenderness: There is no abdominal tenderness. There is no guarding.      Comments: Denies constipation or diarrhea, colostomy patent   Genitourinary:     Comments: No  issues  Musculoskeletal:      Right lower leg: Edema present.      Left lower leg: Edema present.      Comments: Non pitting dependent BLE, has R AFO, pain in L UE persists. WBAT, wearing sling. Recent fall   Skin:     General: Skin is warm.      Comments: L shoulder incision intact   Neurological:      General: No focal deficit present.      Mental Status: She is oriented to person, place, and time.   Psychiatric:         Mood and Affect: Mood normal.         Behavior: Behavior normal.         Medication List:  Current Outpatient Medications   Medication Sig   ? acetaminophen (TYLENOL) 500 MG tablet Take 1,000 mg by mouth 4 (four) times a day.    ? amLODIPine (NORVASC) 2.5 MG tablet Take 2.5 mg by mouth 2 (two) times a day.   ? aspirin 81 MG EC tablet Take 81 mg by mouth daily.   ? atenoloL (TENORMIN) 50 MG tablet Take 25 mg by mouth 2 (two) times a day.    ? azelastine (ASTEPRO) 0.15 % (205.5 mcg) Spry nasal spray Apply 1 spray into each nostril at bedtime.   ? biotin 1 mg tablet Take 1,000 mcg by mouth daily.   ? cholecalciferol, vitamin D3, 5,000 unit Tab Take 5,000 Units by mouth daily.   ? clotrimazole (LOTRIMIN) 1 % cream Apply 1 application topically daily as needed.   ? famotidine (PEPCID) 40 MG tablet Take 40 mg by mouth daily as needed for heartburn.   ? fenofibrate (TRICOR) 145 MG tablet Take 145 mg by mouth daily.   ? fexofenadine (ALLEGRA) 180 MG tablet Take 180 mg by mouth daily.   ? fluticasone propionate (FLONASE) 50 mcg/actuation nasal spray Apply 1 spray into each nostril daily.   ? furosemide (LASIX) 20 MG tablet Take 20 mg by mouth daily as needed.   ? gabapentin (NEURONTIN) 100 MG capsule Take 100 mg by mouth every evening.   ? hydrOXYzine pamoate (VISTARIL) 25 MG capsule Take 25 mg by mouth 3 (three) times a day. Give with tylenol   ? insulin glargine U-300 conc 300 unit/mL (3 mL) InPn Inject 5 Units under the skin daily before breakfast.   ? levothyroxine (SYNTHROID, LEVOTHROID) 112 MCG  tablet Take 112 mcg by mouth daily. Except for Fridays. And 56 mcg on Friday.   ? lidocaine 4 % patch Place 1 patch on the skin daily. Remove and discard patch with 12 hours. Apply to R knee   ? lisinopriL (PRINIVIL,ZESTRIL) 40 MG tablet Take 40 mg by mouth daily.   ? melatonin 3 mg Tab tablet Take 6 mg by mouth at bedtime.   ? mometasone (NASONEX) 50 mcg/actuation nasal spray 1 spray into each nostril 2 (two) times a day.   ? multivitamin (MULTIPLE VITAMINS ORAL) Take 1 tablet by mouth daily.   ? nitroglycerin (NITROSTAT) 0.4 MG SL tablet Place 0.4 mg under the tongue every 5 (five) minutes as needed for chest pain.   ? nystatin (MYCOSTATIN) cream Apply 1 application topically 2 (two) times a day as needed for dry skin.   ? nystatin (MYCOSTATIN) powder Apply 1 application topically daily. every Thu, Sun for use with colostomy after bath   ? nystatin-triamcinolone (MYCOLOG II) cream Apply 1 application topically daily as needed.   ? ondansetron (ZOFRAN) 4 MG tablet Take 4 mg by mouth every 8 (eight) hours as needed for nausea.   ? oxyCODONE (ROXICODONE) 5 MG immediate release tablet Take 1 tablet (5 mg total) by mouth every 4 (four) hours as needed for pain.   ? pantoprazole (PROTONIX) 40 MG tablet Take 40 mg by mouth 2 (two) times a day.   ? polyethylene glycol (MIRALAX) 17 gram packet Take 17 g by mouth daily as needed.   ? polyethylene glycol 400 (BLINK TEARS) 0.25 % Drop Apply 1 drop to eye 4 (four) times a day as needed.   ? senna-docusate (SENNOSIDES-DOCUSATE SODIUM) 8.6-50 mg tablet Take 1 tablet by mouth 2 (two) times a day as needed for constipation.   ? SITagliptin (JANUVIA) 50 MG tablet Take 50 mg by mouth daily.   ? sucralfate (CARAFATE) 100 mg/mL suspension Take 1 g by mouth 2 (two) times a day as needed.       Labs:  Results for orders placed or performed in visit on 11/30/20   Basic Metabolic Panel   Result Value Ref Range    Sodium 136 136 - 145 mmol/L    Potassium 4.7 3.5 - 5.0 mmol/L    Chloride  105 98 - 107 mmol/L    CO2 26 22 - 31 mmol/L    Anion Gap, Calculation 5 5 - 18 mmol/L    Glucose 101 70 - 125 mg/dL    Calcium 8.7 8.5 - 10.5 mg/dL    BUN 34 (H) 8 - 28 mg/dL    Creatinine 1.03 0.60 - 1.10 mg/dL    GFR MDRD Af Amer >60 >60 mL/min/1.73m2    GFR MDRD Non Af Amer 52 (L) >60 mL/min/1.73m2     Lab Results   Component Value Date    WBC 6.6 12/02/2020    HGB 9.1 (L) 12/02/2020    HCT 27.5 (L) 12/02/2020    MCV 94 12/02/2020     12/02/2020     Vitamin D, Total (25-Hydroxy)   Date Value Ref Range Status   11/27/2020 58.0 30.0 - 80.0 ng/mL Final       Lab Results   Component Value Date    TSH 14.89 (H) 11/27/2020       Lab Results   Component Value Date    HGBA1C 6.2 (H) 11/27/2020       Vitamin B-12   Date Value Ref Range Status   11/27/2020 860 (H) 213 - 816 pg/mL Final         Assessment/Plan:    Left shouder glenohumeral degenerative joint disease with a hemiarthroplasty and biceps tenodesis: WBAT, f/u with ortho as ordered. Per recent fall and XR requirements, ortho will f/u with her on 12/16    Pain control: continue tylenol 1000mg four times a day and oxycodone 5mg q4h PRN (usually 3x daily). Continue vistaril 25mg three times a day, encourage icing. Pain improved    Acute hyperkalemia: last K 4.7 on 11/30/20, given kayexalate prior. Recheck BMP on 12/10    R knee pain: continue lidocaine patch    CAD: continue ASA 81mg daily, statin and fenofibrate     HTN: continue lisinopril 40mg daily, amlodipine 2.5mg BID and per persistent HR <60 will decrease atenolol to 25mg two times a day. Now HR 70s    Allergies: continue fexofenadine and flonase daily. Advised to change allegra to PRN though reluctulant currently     Hypothyroidism: continue synthroid 112mcg daily and 56mcg on fridays, last TSH 14.89, recheck TSH/FT4 on 12/10    DM2: continue U-300 5U daily, Januvia 50mg daily. Most BS <200    Neuropathy: continue gabapentin 100mg at HS    GERD: continue protonix 40mg two times a day, sucralfate  10mg two times a day PRN and pepcid 40mg daily PRN. Also has zofran PRN    Abdominal pain: Abx negative, resolved. Possibly d/t GERD    Anemia: last Hgb 9.1 on 12/2/20, patient requested FeSO4 to be discontinued, f/u with PCP    Vit D def: continue D3 5000U daily, recheck 58.0    Insomnia: continue melatonin 6mg at HS, discontinued tylenol PM, effective    Morbid obesity: last BMI 36.1    Constipation: continue senna S 1 tab two times a day and miralax daily PRN    Labs: TSH 14.89, vit D B12 860, A1c 6.2    Disposition: lives with son who has cerebral palsy. SLUMS 23/30    MEDICAL EQUIPMENT NEEDS:  na    DISCHARGE PLAN/FACE TO FACE:  I certify that services are/were furnished while this patient was under the care of a physician and that a physician or an allowed non-physician practitioner (NPP), had a face-to-face encounter that meets the physician face-to-face encounter requirements. The encounter was in whole, or in part, related to the primary reason for home health. The patient is confined to his/her home and needs intermittent skilled nursing, physical therapy, speech-language pathology, or the continued need for occupational therapy. A plan of care has been established by a physician and is periodically reviewed by a physician.  Date of Face-to-Face Encounter: 12/10/20    I certify that, based on my findings, the following services are medically necessary home health services: C HHA/RN and PT/OT to evaluate and treat    My clinical findings support the need for the above skilled services because: patient will be discharging to home. Patient will need assistance with medication management and performing IADLs and ADLs effectively and safely    Patient to re-establish plan of care with their PCP within 7 days after leaving TCU.     The care plan has been reviewed and all orders signed. Changes to care plan, if any, as noted. Otherwise, continue care plan of care.  The total time spent with this patient was 31  minutes, with greater than 50% spent in counseling and coordination of care that included multiple issues per f/u with ortho, pain control, continuing therapy and discharge, which lasted 16 minutes.      Electronically signed by: Leonides Acuna NP

## 2021-06-21 NOTE — LETTER
Letter by Leonides Acuna NP at      Author: Leonides Acuna NP Service: -- Author Type: --    Filed:  Encounter Date: 12/1/2020 Status: (Other)         [facility] [address] [address]                                  December 1, 2020    Patient: Chasity Hodgson   MR Number: [id number]   YOB: 1945   Date of Visit: 12/1/2020     Dear Dr. Rendon:    Thank you for referring Chasity Hodgson to me for evaluation. Below are the relevant portions of my assessment and plan of care.    If you have questions, please do not hesitate to call me. I look forward to following Chasity along with you.    Sincerely,        Leonides Acuna NP          CC  No Recipients  Leonides Acuna NP  12/1/2020  4:47 PM  Signed  [facility]      Facility:    [facility] [[id number]]  Code Status: FULL CODE      Chief Complaint/Reason for Visit:  313  Chief Complaint   Patient presents with   ? Review Of Multiple Medical Conditions     malnutrition F/U left shoulder hemiarthroplasty, pain control        HPI:   Chasity is a 75 y.o. female who is a transfer from [facility] with an admission on 11/12/20 and discharge on 11/17/20. She has a PMH of DM2, CAD, hypothyroidism, DVT/PE, Htn, who underwent operative management of a left shouder glenohumeral degenerative joint disease with a hemiarthroplasty and biceps tenodesis. Postoperatively no issues other than some nausea which was remedied. She was given WBAT and tylenol/oxycodone for pain control. She was then discharged to the TCU.    Today will assess vitals, recent fall with XR, pain control and therapy.     Past Medical History:  Past Medical History:   Diagnosis Date   ? Abdominal adhesions    ? Acne rosacea    ? Allergic rhinitis    ? Alopecia    ? Anemia    ? CAD (coronary artery disease)    ? CKD (chronic kidney disease) stage 2, GFR 60-89 ml/min    ? Colostomy in  place (H)    ? Depression    ? DM2 (diabetes mellitus, type 2) (H)    ? DVT (deep venous thrombosis) (H)    ? GERD (gastroesophageal reflux disease)    ? HTN (hypertension)    ? Hypertriglyceridemia    ? Hypokalemia    ? Hypothyroidism    ? ANNETTE on CPAP    ? Osteoarthritis of glenohumeral joint    ? Papillary carcinoma of thyroid (H)    ? Poliomyelitis    ? Pulmonary embolus (H)    ? Pulmonary nodule    ? Septic joint of right knee joint (H)    ? Venous insufficiency            Surgical History:  Past Surgical History:   Procedure Laterality Date   ? APPENDECTOMY     ? CARPAL TUNNEL RELEASE     ? CHOLECYSTECTOMY     ? COLOSTOMY     ? FOOT ARTHRODESIS Right     Right foot triple arthrodesis and removal of hardware   ? ROTATOR CUFF REPAIR     ? SHOULDER ARTHROSCOPY  06/25/2015    REVISION SUBACROMIAL DECOMPRESSION, EXCISION OF GANGLION CYST, DEBRIDEMENT AND EXCISION OF THE GLENOHUMERAL JOINT GANGLION CYST, CORACOID DECOMPRESSION, POSSIBLE SUBSCAPULARIS REPAIR AND OPEN SUBSCAPULARIS BICEP   ? SHOULDER SURGERY  11/12/2020    LEFT SHOULDER HEMIARHTROPLASTY, BICEP TENODESIS   ? TENDON RELEASE      foot   ? THYROIDECTOMY     ? TOE AMPUTATION     ? TOTAL ABDOMINAL HYSTERECTOMY         Family History:   No family history on file.    Social History:    Social History     Socioeconomic History   ? Marital status:      Spouse name: Not on file   ? Number of children: Not on file   ? Years of education: Not on file   ? Highest education level: Not on file   Occupational History   ? Not on file   Social Needs   ? Financial resource strain: Not on file   ? Food insecurity     Worry: Not on file     Inability: Not on file   ? Transportation needs     Medical: Not on file     Non-medical: Not on file   Tobacco Use   ? Smoking status: Never Smoker   ? Smokeless tobacco: Never Used   Substance and Sexual Activity   ? Alcohol use: Not Currently   ? Drug use: Not on file   ? Sexual activity: Not on file   Lifestyle   ? Physical  "activity     Days per week: Not on file     Minutes per session: Not on file   ? Stress: Not on file   Relationships   ? Social connections     Talks on phone: Not on file     Gets together: Not on file     Attends [employer]ism service: Not on file     Active member of club or organization: Not on file     Attends meetings of clubs or organizations: Not on file     Relationship status: Not on file   ? Intimate partner violence     Fear of current or ex partner: Not on file     Emotionally abused: Not on file     Physically abused: Not on file     Forced sexual activity: Not on file   Other Topics Concern   ? Not on file   Social History Narrative   ? Not on file          Review of Systems   Constitutional: Positive for activity change and fatigue. Negative for appetite change, chills and diaphoresis.   HENT: Negative for congestion and hearing loss.    Eyes: Negative.    Cardiovascular: Positive for leg swelling.   Gastrointestinal: Negative for abdominal distention, abdominal pain, blood in stool, constipation, diarrhea and nausea.   Endocrine: Negative.    Genitourinary: Negative for difficulty urinating.   Musculoskeletal:        L UE   Skin:        L shoulder   Allergic/Immunologic: Negative.    Neurological: Negative for dizziness, tremors, speech difficulty and light-headedness.   Hematological: Negative.    Psychiatric/Behavioral: Positive for sleep disturbance. Negative for agitation, confusion and hallucinations. The patient is not nervous/anxious.        Vitals:    12/01/20 0723   BP: 121/55   Pulse: 63   Resp: 16   Temp: (!) 96.4  F (35.8  C)   SpO2: 99%   Weight: 181 lb 4.8 oz (82.2 kg)   Height: 4' 10.5\" (1.486 m)       Physical Exam  Vitals signs reviewed.   Constitutional:       Appearance: She is obese.   HENT:      Head: Normocephalic.      Right Ear: External ear normal.      Left Ear: External ear normal.      Nose: No congestion.      Mouth/Throat:      Pharynx: No oropharyngeal exudate.   Eyes:     "  General:         Right eye: No discharge.         Left eye: No discharge.   Neck:      Musculoskeletal: Normal range of motion.   Cardiovascular:      Rate and Rhythm: Normal rate.      Pulses: Normal pulses.      Heart sounds: No murmur.   Pulmonary:      Effort: Pulmonary effort is normal. No respiratory distress.      Comments: RA, CTA  Abdominal:      General: There is no distension.      Palpations: Abdomen is soft.      Tenderness: There is no abdominal tenderness. There is no guarding.      Comments: Denies constipation or diarrhea, colostomy patent   Genitourinary:     Comments: No issues  Musculoskeletal:      Right lower leg: Edema present.      Left lower leg: Edema present.      Comments: Non pitting dependent BLE, has R AFO, pain in L UE. WBAT, wearing sling. Recent fall   Skin:     General: Skin is warm.      Comments: L shoulder incision intact   Neurological:      General: No focal deficit present.      Mental Status: She is oriented to person, place, and time.   Psychiatric:         Mood and Affect: Mood normal.         Behavior: Behavior normal.         Medication List:  Current Outpatient Medications   Medication Sig   ? acetaminophen (TYLENOL) 500 MG tablet Take 1,000 mg by mouth 4 (four) times a day.    ? amLODIPine (NORVASC) 2.5 MG tablet Take 2.5 mg by mouth 2 (two) times a day.   ? aspirin 81 MG EC tablet Take 81 mg by mouth daily.   ? atenoloL (TENORMIN) 50 MG tablet Take 50 mg by mouth 2 (two) times a day.   ? azelastine (ASTEPRO) 0.15 % (205.5 mcg) Spry nasal spray Apply 1 spray into each nostril at bedtime.   ? biotin 1 mg tablet Take 1,000 mcg by mouth daily.   ? cholecalciferol, vitamin D3, 5,000 unit Tab Take 5,000 Units by mouth daily.   ? clotrimazole (LOTRIMIN) 1 % cream Apply 1 application topically daily as needed.   ? famotidine (PEPCID) 40 MG tablet Take 40 mg by mouth daily as needed for heartburn.   ? fenofibrate (TRICOR) 145 MG tablet Take 145 mg by mouth daily.   ? ferrous  sulfate 325 (65 FE) MG tablet Take 1 tablet by mouth daily with breakfast.   ? fexofenadine (ALLEGRA) 180 MG tablet Take 180 mg by mouth daily.   ? fluticasone propionate (FLONASE) 50 mcg/actuation nasal spray Apply 1 spray into each nostril daily.   ? furosemide (LASIX) 20 MG tablet Take 20 mg by mouth daily as needed.   ? gabapentin (NEURONTIN) 100 MG capsule Take 100 mg by mouth every evening.   ? hydrOXYzine pamoate (VISTARIL) 25 MG capsule Take 25 mg by mouth 3 (three) times a day. Give with tylenol   ? insulin glargine U-300 conc 300 unit/mL (3 mL) InPn Inject 5 Units under the skin daily before breakfast.   ? levothyroxine (SYNTHROID, LEVOTHROID) 112 MCG tablet Take 112 mcg by mouth daily. Except for Fridays. And 56 mcg on Friday.   ? lidocaine 4 % patch Place 1 patch on the skin daily. Remove and discard patch with 12 hours. Apply to R knee   ? lisinopriL (PRINIVIL,ZESTRIL) 40 MG tablet Take 40 mg by mouth daily.   ? melatonin 3 mg Tab tablet Take 6 mg by mouth at bedtime.   ? mometasone (NASONEX) 50 mcg/actuation nasal spray 1 spray into each nostril 2 (two) times a day.   ? multivitamin (MULTIPLE VITAMINS ORAL) Take 1 tablet by mouth daily.   ? nitroglycerin (NITROSTAT) 0.4 MG SL tablet Place 0.4 mg under the tongue every 5 (five) minutes as needed for chest pain.   ? nystatin (MYCOSTATIN) cream Apply 1 application topically 2 (two) times a day as needed for dry skin.   ? nystatin (MYCOSTATIN) powder Apply 1 application topically daily. every Thu, Sun for use with colostomy after bath   ? nystatin-triamcinolone (MYCOLOG II) cream Apply 1 application topically daily as needed.   ? ondansetron (ZOFRAN) 4 MG tablet Take 4 mg by mouth every 8 (eight) hours as needed for nausea.   ? oxyCODONE (ROXICODONE) 5 MG immediate release tablet Take 1 tablet (5 mg total) by mouth every 4 (four) hours as needed for pain.   ? pantoprazole (PROTONIX) 40 MG tablet Take 40 mg by mouth 2 (two) times a day.   ? polyethylene  glycol (MIRALAX) 17 gram packet Take 17 g by mouth daily as needed.   ? polyethylene glycol 400 (BLINK TEARS) 0.25 % Drop Apply 1 drop to eye 4 (four) times a day as needed.   ? senna-docusate (SENNOSIDES-DOCUSATE SODIUM) 8.6-50 mg tablet Take 1 tablet by mouth 2 (two) times a day as needed for constipation.   ? SITagliptin (JANUVIA) 50 MG tablet Take 50 mg by mouth daily.   ? sucralfate (CARAFATE) 100 mg/mL suspension Take 1 g by mouth 2 (two) times a day as needed.       Labs:  Results for orders placed or performed in visit on 11/30/20   Basic Metabolic Panel   Result Value Ref Range    Sodium 136 136 - 145 mmol/L    Potassium 4.7 3.5 - 5.0 mmol/L    Chloride 105 98 - 107 mmol/L    CO2 26 22 - 31 mmol/L    Anion Gap, Calculation 5 5 - 18 mmol/L    Glucose 101 70 - 125 mg/dL    Calcium 8.7 8.5 - 10.5 mg/dL    BUN 34 (H) 8 - 28 mg/dL    Creatinine 1.03 0.60 - 1.10 mg/dL    GFR MDRD Af Amer >60 >60 mL/min/1.73m2    GFR MDRD Non Af Amer 52 (L) >60 mL/min/1.73m2     Lab Results   Component Value Date    WBC 8.0 11/27/2020    HGB 9.2 (L) 11/27/2020    HCT 28.4 (L) 11/27/2020    MCV 94 11/27/2020     11/27/2020     Vitamin D, Total (25-Hydroxy)   Date Value Ref Range Status   11/27/2020 58.0 30.0 - 80.0 ng/mL Final       Lab Results   Component Value Date    TSH 14.89 (H) 11/27/2020       Lab Results   Component Value Date    HGBA1C 6.2 (H) 11/27/2020       Vitamin B-12   Date Value Ref Range Status   11/27/2020 860 (H) 213 - 816 pg/mL Final         Assessment/Plan:    Left shouder glenohumeral degenerative joint disease with a hemiarthroplasty and biceps tenodesis: WBAT, f/u with ortho as ordered. Per recent fall, ortho determining if clinic visit would be appropriate per XR    Pain control: continue tylenol 1000mg four times a day and oxycodone 5mg q4h PRN (2x daily). Continue vistaril 25mg three times a day, encourage icing. Pain improved    Acute hyperkalemia: last K 4.7 on 11/30/20, given kayexalate prior.  Recheck BMP on 12/8    R knee pain: continue lidocaine patch    CAD: continue ASA 81mg daily, statin and fenofibrate     HTN: continue lisinopril 40mg daily, amlodipine 2.5mg daily and atenolol 50mg two times a day. SBP <130    Allergies: continue fexofenadine and flonase daily. Advised to change allegra to PRN though reluctulant currently     Hypothyroidism: continue synthroid 112mcg daily and 56mcg on fridays, recheck 14.89, f/u with PCP    DM2: continue U-300 5U daily, Januvia 50mg daily. Most BS <200    Neuropathy: continue gabapentin 100mg at HS    GERD: continue protonix 40mg two times a day, sucralfate 10mg two times a day PRN and pepcid 40mg daily PRN. Also has zofran PRN    Abdominal pain: Abx negative, resolved. Possibly d/t GERD    Anemia: continue FeSO4 325mg daily, last Hgb 9.2 on 11/27/20, recheck CBC on 12/2    Vit D def: continue D3 5000U daily, recheck 58.0    Insomnia: continue melatonin 6mg at HS, discontinued tylenol PM, effective    Morbid obesity: last BMI 37.2    Constipation: continue senna S 1 tab two times a day and miralax daily PRN    Labs: TSH 14.89, vit D B12 860, A1c 6.2    Disposition: lives with son who has cerebral palsy. SLUMS 23/30      Electronically signed by: Leonides Acuna NP

## 2021-06-24 ENCOUNTER — OFFICE VISIT (OUTPATIENT)
Dept: FAMILY MEDICINE | Facility: CLINIC | Age: 76
End: 2021-06-24
Payer: MEDICARE

## 2021-06-24 VITALS
DIASTOLIC BLOOD PRESSURE: 66 MMHG | OXYGEN SATURATION: 100 % | WEIGHT: 196.9 LBS | TEMPERATURE: 98 F | HEART RATE: 64 BPM | HEIGHT: 60 IN | BODY MASS INDEX: 38.66 KG/M2 | SYSTOLIC BLOOD PRESSURE: 160 MMHG

## 2021-06-24 DIAGNOSIS — I89.0 LYMPHEDEMA: Primary | ICD-10-CM

## 2021-06-24 DIAGNOSIS — T14.8XXA OPEN WOUND OF SKIN: ICD-10-CM

## 2021-06-24 PROCEDURE — 99213 OFFICE O/P EST LOW 20 MIN: CPT | Performed by: NURSE PRACTITIONER

## 2021-06-24 ASSESSMENT — MIFFLIN-ST. JEOR: SCORE: 1314.63

## 2021-06-24 NOTE — PATIENT INSTRUCTIONS
Schedule with the lymphedema clinic.     Schedule an appt with Dr Benoit at the end of next week or the week after

## 2021-06-24 NOTE — PROGRESS NOTES
Assessment & Plan   Problem List Items Addressed This Visit     None      Visit Diagnoses     Lymphedema    -  Primary    Relevant Orders    LYMPHEDEMA THERAPY REFERRAL    Open wound of skin        Relevant Orders    LYMPHEDEMA THERAPY REFERRAL         Pt has persistent left WAQAR with 2 superficial wounds. I suspect this is more r/t venous insufficiency and lymphedema than infection. Will refer to lymphedema clinic for care. Area of mild redness is outlined today and she is instructed to seek care for worsening redness. She should follow-up with PCP in 1-2 weeks for recheck.       MAGALY Shannon CNP  [facility] LEE ANN Cochran is a 75 year old who presents for the following health issues     HPI     Follow up on cellulitis, one wound looks worse per patient and they have been still draining. Patient is changing gauze either daily or every other day    Gets a sharp pain on occasion near the wounds   Occasional minimal calf pain   When she took tape off it ripped her skin a little   No fevers but has been getting some night sweats the last month and is a little worse. Has been every night            Review of Systems   Detailed as above         Objective    BP (!) 160/66 (BP Location: Right arm, Patient Position: Sitting, Cuff Size: Adult Regular)   Pulse 64   Temp 98  F (36.7  C) (Oral)   Ht 1.524 m (5')   Wt 89.3 kg (196 lb 14.4 oz)   SpO2 100%   BMI 38.45 kg/m    There is no height or weight on file to calculate BMI.  Physical Exam  Cardiovascular:      Pulses: Normal pulses.   Musculoskeletal:      Left lower leg: Edema (3-4+) present.   Skin:     General: Skin is warm and dry.      Comments: 2 superficial wounds of left lateral lower extremity: 1x1cm and 2x1cm   Faint erythema of distal half of left lower extremity   Neurological:      Mental Status: She is alert.

## 2021-06-28 ENCOUNTER — HOSPITAL ENCOUNTER (OUTPATIENT)
Dept: OCCUPATIONAL THERAPY | Facility: CLINIC | Age: 76
Setting detail: THERAPIES SERIES
End: 2021-06-28
Attending: NURSE PRACTITIONER
Payer: MEDICARE

## 2021-06-28 DIAGNOSIS — T14.8XXA OPEN WOUND OF SKIN: ICD-10-CM

## 2021-06-28 DIAGNOSIS — I89.0 LYMPHEDEMA: ICD-10-CM

## 2021-06-28 PROCEDURE — 97166 OT EVAL MOD COMPLEX 45 MIN: CPT | Mod: GO | Performed by: OCCUPATIONAL THERAPIST

## 2021-06-28 PROCEDURE — 97140 MANUAL THERAPY 1/> REGIONS: CPT | Mod: GO | Performed by: OCCUPATIONAL THERAPIST

## 2021-06-29 NOTE — PROGRESS NOTES
06/28/21 1048   Quick Adds   Quick Adds Certification   Rehab Discipline   Discipline OT   Type of Visit   Type of visit Initial Edema Evaluation       present No   General Information   Start of care 06/28/21   Referring physician Dacia Jameson CNP   Orders Evaluate and treat as indicated   Order date 06/24/21   Medical diagnosis Lymphedema   Onset of illness / date of surgery 06/06/21   Edema onset 06/06/21   Affected body parts LLE;RLE   Edema etiology Chronic Venous Insufficiency   Edema etiology comments post polio syndrome, R leg in AFO has worn compression socks knee high.  Pt has recent new wound and infection.  She has last of Abx today.   Pertinent history of current problem (PT: include personal factors and/or comorbidities that impact the POC; OT: include additional occupational profile info) Pt has hx of B shoulder replacements, DVT of L arm and PE hx.  She has DM.  Post polio syndrome since 1948.  She is currently seeing PT for the shoulders and balance.   Surgical / medical history reviewed Yes   Edema special tests Ultrasound   Prior level of functional mobility Pt lives at home iwth her son.  She is independent with her ADLs.  She wishes she had less pain in the legs to move better.   Prior treatment Compression garments;Elevation   Community support Family / friend caregiver   Patient role / employment history Retired   Psychosocial concerns Impaired coping   Living environment House / townhome   Living environment comments She lives on main level with washer and dryer on this level.   Assistive device comments [facility]   Fall Risk Screen   Fall screen completed by PT   Fall screen comments Pt is currently seeing PT for balance.   Abuse Screen (yes response referral indicated)   Feels Unsafe at Home or Work/School no   Feels Threatened by Someone no   Does Anyone Try to Keep You From Having Contact with Others or Doing Things Outside Your Home? no   Physical Signs of Abuse  Present no   System Outcome Measures   Outcome Measures Lymphedema   Lymphedema Life Impact Scale (score range 0-72). A higher score indicates greater impairment. 11   Subjective Report   Patient report of symptoms Pt reports pain that wakes at night in the leg.  She has new wound with new onset of LLE swelling.   Precautions   Precautions comments none   Patient / Family Goals   Patient / family goals statement Pt wishes to get the swelling down to heal the wounds and have less pain in the legs.   Pain   Patient currently in pain Yes   Pain location L leg   Pain rating 6/10, can flare to 10/10 but is 0/10 in am   Pain description Burning   Pain description comment Can have sharp pains.   Cognitive Status   Orientation Orientation to person, place and time   Level of consciousness Alert   Follows commands and answers questions 100% of the time   Personal safety and judgement Intact   Memory Intact   Edema Exam / Assessment   Skin condition Pitting;Dryness   Skin condition comments Pt has fibrosis from toes to knee on LLE and toes to knee RLE.   Pitting 3+   Pitting location pretibial   Scar No   Capillary refill Symmetrical   Dorsal pedal pulse Symmetrical   Stemmer sign Positive   Stemmer sign comments in BLE.s   Ulceration comments Pt has 2 superficial wounds on the LLE with gauze over vaseline.  They stick to gauze on removal.  they are 1x2c, and 1x1c.   Girth Measurements   Girth Measurements Refer to separate girth measurement flowsheet   Volume LE   Right LE (mL) 9984   Left LE (mL) 9913   LE volume comparison RLE volume greater than LLE volume   % difference 1   Range of Motion   ROM Other   ROM comments Pt has AFO for R leg   Strength   Strength comments Pt is seeing PT   Posture   Posture Normal   Palpation   Palpation No pain on palpation.   Activities of Daily Living   Activities of Daily Living Pt is independent with ADLs.   Bed Mobility   Bed mobility Pt is independent.   Transfers   Transfers Pt is  "independent.   Gait / Locomotion   Gait / Locomotion Pt is independent.   Sensory   Sensory perception Light touch   Light touch Impaired   Sensory perception comments R foot   Vascular Assessment   Vascular Assessment Vascular concerns   Vascular Assessment Comments Pt has venous insufficiency   Coordination   Coordination Gross motor coordination appropriate   Muscle Tone   Muscle tone No deficits were identified   Planned Edema Interventions   Planned edema interventions Manual lymph drainage;Gradient compression bandaging;Fit for compression garment;Exercises;Precautions to prevent infection / exacerbation;Education;Manual therapy;ADL training;Skin care / precautions;Home management program development   Planned edema intervention comments Quick wrap with 2x around foot to fit into AFO.   Clinical Impression   Criteria for skilled therapeutic intervention met Yes   Therapy diagnosis Lymmphedema   Influenced by the following impairments / conditions Edema;Stage 2;Phlebolymphedema;Wounds / Ulcers   Assessment of Occupational Performance 3-5 Performance Deficits   Identified Performance Deficits Pain, wounds, risk of infection   Clinical Decision Making (Complexity) Moderate complexity   Treatment Frequency 1x/week   Treatment duration 3 weeks, then 0x/week for 3 weeks and once a week for one week.   Patient / family and/or staff in agreement with plan of care Yes   Risks and benefits of therapy have been explained Yes   Education Assessment   Preferred learning style Listening;Reading;Demonstration;Pictures / video   Barriers to learning No barriers   Goals   Edema Eval Goals 1;2;3;4;5   Goal 1   Goal identifier 1   Goal description In order to improve functional mobility for activities of living, by the completion of intensive treatment, patient and/or caregiver will;\"Verbalize and/or demonstrate understanding of techniques to independently manage their lymphedema at home   Target date 08/20/21   Goal 2   Goal " identifier 1a   Goal description .    tolerate gradient compression bandaging/wearing compression garments 23 hrs/day to decrease volume of extracellular fluid   Target date 08/20/21   Goal 3   Goal identifier 1b   Goal description demonstrate independence in applying gradient compression bandages quick wrap.   Target date 08/20/21   Goal 4   Goal identifier 1c   Goal description demonstrate independence in performing prescribed self MLD and exercises to facilate the lymph system   Target date 08/20/21   Goal 5   Goal identifier 2   Goal description Pt will heal the L leg wounds with less swelling noted to decrease risk of infection.   Target date 08/20/21   Total Evaluation Time   OT Eval, Moderate Complexity Minutes (65348) 45   Certification   Certification date from 06/29/21   Certification date to 08/20/21   Medical Diagnosis Lymphedema   Certification I certify the need for these services furnished under this plan of treatment and while under my care.  (Physician co-signature of this document indicates review and certification of the therapy plan).

## 2021-06-29 NOTE — PROGRESS NOTES
"                                                                                                            [facility]        OUTPATIENT OCCUPATIONAL THERAPY EDEMA EVALUATION  PLAN OF TREATMENT FOR OUTPATIENT REHABILITATION  (COMPLETE FOR INITIAL CLAIMS ONLY)  Patient's Last Name, First Name, Chasity Child                           Provider s Name:   [facility] Medical Record No.  [id number]     Start of Care Date:  06/28/21   Onset Date:  06/06/21   Type:  OT   Medical Diagnosis:  Lymphedema   Therapy Diagnosis:  Lymmphedema Visits from SOC:  1                                     __________________________________________________________________________________   Plan of Treatment/Functional Goals:    Manual lymph drainage, Gradient compression bandaging, Fit for compression garment, Exercises, Precautions to prevent infection / exacerbation, Education, Manual therapy, ADL training, Skin care / precautions, Home management program development  Quick wrap with 2x around foot to fit into AFO.     GOALS  1. Goal description: In order to improve functional mobility for activities of living, by the completion of intensive treatment, patient and/or caregiver will;\"Verbalize and/or demonstrate understanding of techniques to independently manage their lymphedema at home       Target date: 08/20/21  2. Goal description: .    tolerate gradient compression bandaging/wearing compression garments 23 hrs/day to decrease volume of extracellular fluid       Target date: 08/20/21  3. Goal description: demonstrate independence in applying gradient compression bandages quick wrap.       Target date: 08/20/21  4. Goal description: demonstrate independence in performing prescribed self MLD and exercises to facilate the lymph system       Target date: 08/20/21  5. Goal description: Pt will heal the L leg wounds with less swelling noted to decrease risk of infection.       Target date: " 08/20/21  6.               7.             8.              Treatment Frequency: 1x/week   Treatment duration: 3 weeks, then 0x/week for 3 weeks and once a week for one week.    Sharon Christy OT                                    I CERTIFY THE NEED FOR THESE SERVICES FURNISHED UNDER        THIS PLAN OF TREATMENT AND WHILE UNDER MY CARE     (Physician co-signature of this document indicates review and certification of the therapy plan).                   Certification date from: 06/29/21       Certification date to: 08/20/21           Referring physician: Dacia Jameson CNP   Initial Assessment  See Epic Evaluation- Start of care: 06/28/21

## 2021-07-03 ENCOUNTER — HEALTH MAINTENANCE LETTER (OUTPATIENT)
Age: 76
End: 2021-07-03

## 2021-07-07 ENCOUNTER — OFFICE VISIT (OUTPATIENT)
Dept: FAMILY MEDICINE | Facility: CLINIC | Age: 76
End: 2021-07-07
Payer: MEDICARE

## 2021-07-07 VITALS
HEART RATE: 69 BPM | HEIGHT: 60 IN | BODY MASS INDEX: 38.62 KG/M2 | SYSTOLIC BLOOD PRESSURE: 138 MMHG | OXYGEN SATURATION: 99 % | DIASTOLIC BLOOD PRESSURE: 76 MMHG | TEMPERATURE: 97.2 F | WEIGHT: 196.7 LBS

## 2021-07-07 DIAGNOSIS — C73 PAPILLARY CARCINOMA OF THYROID (H): ICD-10-CM

## 2021-07-07 DIAGNOSIS — F32.0 MILD MAJOR DEPRESSION (H): ICD-10-CM

## 2021-07-07 DIAGNOSIS — N18.31 STAGE 3A CHRONIC KIDNEY DISEASE (H): ICD-10-CM

## 2021-07-07 DIAGNOSIS — E11.9 TYPE 2 DIABETES, HBA1C GOAL < 7% (H): Primary | ICD-10-CM

## 2021-07-07 DIAGNOSIS — E55.9 VITAMIN D DEFICIENCY: ICD-10-CM

## 2021-07-07 DIAGNOSIS — E66.01 MORBID OBESITY (H): ICD-10-CM

## 2021-07-07 LAB — HBA1C MFR BLD: 8.7 % (ref 0–5.6)

## 2021-07-07 PROCEDURE — 36415 COLL VENOUS BLD VENIPUNCTURE: CPT | Performed by: INTERNAL MEDICINE

## 2021-07-07 PROCEDURE — 82306 VITAMIN D 25 HYDROXY: CPT | Performed by: INTERNAL MEDICINE

## 2021-07-07 PROCEDURE — 80048 BASIC METABOLIC PNL TOTAL CA: CPT | Performed by: INTERNAL MEDICINE

## 2021-07-07 PROCEDURE — 82043 UR ALBUMIN QUANTITATIVE: CPT | Performed by: INTERNAL MEDICINE

## 2021-07-07 PROCEDURE — 99214 OFFICE O/P EST MOD 30 MIN: CPT | Performed by: INTERNAL MEDICINE

## 2021-07-07 PROCEDURE — 83036 HEMOGLOBIN GLYCOSYLATED A1C: CPT | Performed by: INTERNAL MEDICINE

## 2021-07-07 ASSESSMENT — MIFFLIN-ST. JEOR: SCORE: 1313.73

## 2021-07-07 NOTE — PATIENT INSTRUCTIONS
(E11.9) Type 2 diabetes, HbA1c goal < 7% (H)  (primary encounter diagnosis)  Comment: Due for follow up in endocrinology - check labs today  Plan: Basic metabolic panel  (Ca, Cl, CO2, Creat,         Gluc, K, Na, BUN), Hemoglobin A1c, Albumin         Random Urine Quantitative with Creat Ratio            (F32.0) Mild major depression (H)  Comment: Discussed options for selective serotonin reuptake inhibitor use and declined  Plan:     (E66.01) Morbid obesity (H)  Comment: Continue to work on self-directed dieting  Plan:     (C73) Papillary carcinoma of thyroid (H)  Comment: Recommend follow up in endocrinology   Plan:     (N18.31) Stage 3a chronic kidney disease  Comment: Check labs today  Plan:     Lymphedema  Comment; Follow-up with lymphedema therapy

## 2021-07-08 ENCOUNTER — HOSPITAL ENCOUNTER (OUTPATIENT)
Dept: OCCUPATIONAL THERAPY | Facility: CLINIC | Age: 76
Setting detail: THERAPIES SERIES
End: 2021-07-08
Attending: NURSE PRACTITIONER
Payer: MEDICARE

## 2021-07-08 LAB
ANION GAP SERPL CALCULATED.3IONS-SCNC: 9 MMOL/L (ref 3–14)
BUN SERPL-MCNC: 27 MG/DL (ref 7–30)
CALCIUM SERPL-MCNC: 9.4 MG/DL (ref 8.5–10.1)
CHLORIDE SERPL-SCNC: 107 MMOL/L (ref 94–109)
CO2 SERPL-SCNC: 24 MMOL/L (ref 20–32)
CREAT SERPL-MCNC: 1.01 MG/DL (ref 0.52–1.04)
CREAT UR-MCNC: 91 MG/DL
DEPRECATED CALCIDIOL+CALCIFEROL SERPL-MC: 73 UG/L (ref 20–75)
GFR SERPL CREATININE-BSD FRML MDRD: 55 ML/MIN/{1.73_M2}
GLUCOSE SERPL-MCNC: 176 MG/DL (ref 70–99)
MICROALBUMIN UR-MCNC: 23 MG/L
MICROALBUMIN/CREAT UR: 25.52 MG/G CR (ref 0–25)
POTASSIUM SERPL-SCNC: 4.7 MMOL/L (ref 3.4–5.3)
SODIUM SERPL-SCNC: 140 MMOL/L (ref 133–144)

## 2021-07-08 PROCEDURE — 97140 MANUAL THERAPY 1/> REGIONS: CPT | Mod: GO

## 2021-07-09 NOTE — RESULT ENCOUNTER NOTE
Gerard Gaspar,    I have had the opportunity to review your recent results and an interpretation is as follows:  Your follow up hemoglobin A1c shows elevation of your average b   Your vitamin D returned stable  Your basic metabolic panel shows stable renal function and elctrolytes  Your albumin is a bit elevated and we will continue lisinopril    Sincerely,  Shola Benoit MD

## 2021-07-15 ENCOUNTER — TRANSFERRED RECORDS (OUTPATIENT)
Dept: HEALTH INFORMATION MANAGEMENT | Facility: CLINIC | Age: 76
End: 2021-07-15

## 2021-07-15 ENCOUNTER — HOSPITAL ENCOUNTER (OUTPATIENT)
Dept: OCCUPATIONAL THERAPY | Facility: CLINIC | Age: 76
Setting detail: THERAPIES SERIES
End: 2021-07-15
Attending: NURSE PRACTITIONER
Payer: MEDICARE

## 2021-07-15 DIAGNOSIS — I89.0 LYMPHEDEMA OF BOTH LOWER EXTREMITIES: Primary | ICD-10-CM

## 2021-07-15 PROCEDURE — 97140 MANUAL THERAPY 1/> REGIONS: CPT | Mod: GO | Performed by: OCCUPATIONAL THERAPIST

## 2021-07-22 ENCOUNTER — HOSPITAL ENCOUNTER (OUTPATIENT)
Dept: OCCUPATIONAL THERAPY | Facility: CLINIC | Age: 76
Setting detail: THERAPIES SERIES
End: 2021-07-22
Attending: NURSE PRACTITIONER
Payer: MEDICARE

## 2021-07-22 PROCEDURE — 97140 MANUAL THERAPY 1/> REGIONS: CPT | Mod: GO | Performed by: OCCUPATIONAL THERAPIST

## 2021-07-22 NOTE — PROGRESS NOTES
"Outpatient Occupational Therapy Discharge Note     Patient: Chasity Hodgson  : 1945    Beginning/End Dates of Reporting Period:  2821 to 21    Referring Provider: Dacia Jameson CNP    Therapy Diagnosis: Lymphedema    Client Self Report:  Pt reports her legs are generally free of pain except for the day she was in car/appt/car for 5 hours.    Objective Measurements:  Pt reduced 641mL in the LLE.  Her wounds have healed    Outcome Measures (most recent score):  Lymphedema Life Impact Scale (score range 0-72). A higher score indicates greater impairment.: 17    Goals:   Goal Identifier 1   Goal Description In order to improve functional mobility for activities of living, by the completion of intensive treatment, patient and/or caregiver will;\"Verbalize and/or demonstrate understanding of techniques to independently manage their lymphedema at home   Target Date 21   Date Met  21   Progress (detail required for progress note):goal met     Goal Identifier 1a   Goal Description .    tolerate gradient compression bandaging/wearing compression garments 23 hrs/day to decrease volume of extracellular fluid   Target Date 21   Date Met  21   Progress (detail required for progress note):goal met     Goal Identifier 1b   Goal Description demonstrate independence in applying gradient compression bandages quick wrap.   Target Date 21   Date Met  21   Progress (detail required for progress note):goal met     Goal Identifier 1c   Goal Description demonstrate independence in performing prescribed self MLD and exercises to facilate the lymph system   Target Date 21   Date Met  21   Progress (detail required for progress note):goal met     Goal Identifier 2   Goal Description Pt will heal the L leg wounds with less swelling noted to decrease risk of infection.   Target Date 21   Date Met  21   Progress (detail required for progress note):goal met "         Plan:  Continue therapy per current plan of care.    Discharge:    Reason for Discharge: Patient has met all goals.    Equipment Issued: gcb, pt to get velcro devices    Discharge Plan: Patient to continue home program.

## 2021-08-02 ENCOUNTER — OFFICE VISIT (OUTPATIENT)
Dept: FAMILY MEDICINE | Facility: CLINIC | Age: 76
End: 2021-08-02
Payer: MEDICARE

## 2021-08-02 VITALS
BODY MASS INDEX: 38.68 KG/M2 | WEIGHT: 197 LBS | OXYGEN SATURATION: 100 % | HEART RATE: 56 BPM | SYSTOLIC BLOOD PRESSURE: 185 MMHG | DIASTOLIC BLOOD PRESSURE: 71 MMHG | HEIGHT: 60 IN | TEMPERATURE: 97.7 F

## 2021-08-02 DIAGNOSIS — G47.33 OSA (OBSTRUCTIVE SLEEP APNEA): ICD-10-CM

## 2021-08-02 DIAGNOSIS — Z01.818 PREOP GENERAL PHYSICAL EXAM: Primary | ICD-10-CM

## 2021-08-02 DIAGNOSIS — I10 BENIGN ESSENTIAL HYPERTENSION: ICD-10-CM

## 2021-08-02 DIAGNOSIS — N18.30 STAGE 3 CHRONIC KIDNEY DISEASE, UNSPECIFIED WHETHER STAGE 3A OR 3B CKD (H): ICD-10-CM

## 2021-08-02 DIAGNOSIS — R60.0 LOWER EXTREMITY EDEMA: ICD-10-CM

## 2021-08-02 DIAGNOSIS — R19.8 RECTAL DISCHARGE: ICD-10-CM

## 2021-08-02 DIAGNOSIS — N64.4 BREAST PAIN, LEFT: ICD-10-CM

## 2021-08-02 DIAGNOSIS — E78.2 MIXED HYPERLIPIDEMIA: ICD-10-CM

## 2021-08-02 DIAGNOSIS — E11.9 TYPE 2 DIABETES, HBA1C GOAL < 7% (H): ICD-10-CM

## 2021-08-02 DIAGNOSIS — I10 ESSENTIAL HYPERTENSION: ICD-10-CM

## 2021-08-02 LAB
BASOPHILS # BLD AUTO: 0 10E3/UL (ref 0–0.2)
BASOPHILS NFR BLD AUTO: 1 %
EOSINOPHIL # BLD AUTO: 0.2 10E3/UL (ref 0–0.7)
EOSINOPHIL NFR BLD AUTO: 3 %
ERYTHROCYTE [DISTWIDTH] IN BLOOD BY AUTOMATED COUNT: 12.4 % (ref 10–15)
HBA1C MFR BLD: 9 % (ref 0–5.6)
HCT VFR BLD AUTO: 35.1 % (ref 35–47)
HGB BLD-MCNC: 12.1 G/DL (ref 11.7–15.7)
LYMPHOCYTES # BLD AUTO: 2.2 10E3/UL (ref 0.8–5.3)
LYMPHOCYTES NFR BLD AUTO: 26 %
MCH RBC QN AUTO: 31 PG (ref 26.5–33)
MCHC RBC AUTO-ENTMCNC: 34.5 G/DL (ref 31.5–36.5)
MCV RBC AUTO: 90 FL (ref 78–100)
MONOCYTES # BLD AUTO: 0.7 10E3/UL (ref 0–1.3)
MONOCYTES NFR BLD AUTO: 8 %
NEUTROPHILS # BLD AUTO: 5.3 10E3/UL (ref 1.6–8.3)
NEUTROPHILS NFR BLD AUTO: 62 %
PLATELET # BLD AUTO: 214 10E3/UL (ref 150–450)
RBC # BLD AUTO: 3.9 10E6/UL (ref 3.8–5.2)
WBC # BLD AUTO: 8.4 10E3/UL (ref 4–11)

## 2021-08-02 PROCEDURE — 80053 COMPREHEN METABOLIC PANEL: CPT | Performed by: INTERNAL MEDICINE

## 2021-08-02 PROCEDURE — 99214 OFFICE O/P EST MOD 30 MIN: CPT | Performed by: INTERNAL MEDICINE

## 2021-08-02 PROCEDURE — 93000 ELECTROCARDIOGRAM COMPLETE: CPT | Performed by: INTERNAL MEDICINE

## 2021-08-02 PROCEDURE — 85025 COMPLETE CBC W/AUTO DIFF WBC: CPT | Performed by: INTERNAL MEDICINE

## 2021-08-02 PROCEDURE — 36415 COLL VENOUS BLD VENIPUNCTURE: CPT | Performed by: INTERNAL MEDICINE

## 2021-08-02 PROCEDURE — 84443 ASSAY THYROID STIM HORMONE: CPT | Performed by: INTERNAL MEDICINE

## 2021-08-02 PROCEDURE — 83036 HEMOGLOBIN GLYCOSYLATED A1C: CPT | Performed by: INTERNAL MEDICINE

## 2021-08-02 RX ORDER — AMLODIPINE BESYLATE 2.5 MG/1
2.5 TABLET ORAL 2 TIMES DAILY
Qty: 180 TABLET | Refills: 3 | Status: CANCELLED | OUTPATIENT
Start: 2021-08-02

## 2021-08-02 RX ORDER — AMLODIPINE BESYLATE 5 MG/1
5 TABLET ORAL 2 TIMES DAILY
Qty: 180 TABLET | Refills: 3 | Status: SHIPPED | OUTPATIENT
Start: 2021-08-02 | End: 2021-09-22

## 2021-08-02 RX ORDER — EZETIMIBE 10 MG/1
10 TABLET ORAL DAILY
Qty: 90 TABLET | Refills: 3 | Status: CANCELLED | OUTPATIENT
Start: 2021-08-02

## 2021-08-02 RX ORDER — FUROSEMIDE 20 MG
TABLET ORAL
Qty: 135 TABLET | Refills: 0 | Status: CANCELLED | OUTPATIENT
Start: 2021-08-02

## 2021-08-02 RX ORDER — LISINOPRIL 40 MG/1
40 TABLET ORAL DAILY
Qty: 90 TABLET | Refills: 3 | Status: CANCELLED | OUTPATIENT
Start: 2021-08-02

## 2021-08-02 RX ORDER — ATENOLOL 50 MG/1
50 TABLET ORAL 2 TIMES DAILY
Qty: 180 TABLET | Refills: 3 | Status: CANCELLED | OUTPATIENT
Start: 2021-08-02

## 2021-08-02 ASSESSMENT — MIFFLIN-ST. JEOR: SCORE: 1315.09

## 2021-08-02 NOTE — PROGRESS NOTES
[facility]  [address] [place] [address], [address]  [facility] [address]  Phone: [phone]  Primary Provider: Ana Benoit  Pre-op Performing Provider: ANA BENOIT      PREOPERATIVE EVALUATION:  Today's date: 8/2/2021    Chasity Hodgson is a 75 year old female who presents for a preoperative evaluation.    Surgical Information:  Surgery/Procedure: EXAM UNDER ANESTHESIA, DILATION OF ANAL STRICTURE  Surgery Location: [facility]  Surgeon: Leonides Lawler  Surgery Date: 08/10/2021  Time of Surgery: TBD  Where patient plans to recover: At home with family  Fax number for surgical facility: [phone]    Type of Anesthesia Anticipated: to be determined      Subjective     HPI related to upcoming procedure: Rectal discharge    Preop Questions 8/2/2021   1. Have you ever had a heart attack or stroke? No   2. Have you ever had surgery on your heart or blood vessels, such as a stent placement, a coronary artery bypass, or surgery on an artery in your head, neck, heart, or legs? No   3. Do you have chest pain with activity? No   4. Do you have a history of  heart failure? No   5. Do you currently have a cold, bronchitis or symptoms of other infection? UNKNOWN - no acute bronchitis   6. Do you have a cough, shortness of breath, or wheezing? YES - ongoing cough    7. Do you or anyone in your family have previous history of blood clots? YES - yes, history of deep vein thrombosis - not currently on anticoagulation   8. Do you or does anyone in your family have a serious bleeding problem such as prolonged bleeding following surgeries or cuts? No   9. Have you ever had problems with anemia or been told to take iron pills? YES - not currently    10. Have you had any abnormal blood loss such as black, tarry or bloody stools, or abnormal vaginal bleeding? No   11. Have you ever had a blood transfusion? YES -    11a. Have you ever had a transfusion reaction? UNKNOWN - No history of  transfusion reaction   12. Are you willing to have a blood transfusion if it is medically needed before, during, or after your surgery? Yes   13. Have you or any of your relatives ever had problems with anesthesia? No   14. Do you have sleep apnea, excessive snoring or daytime drowsiness? UNKNOWN - sleepiness    14a. Do you have a CPAP machine? -   15. Do you have any artifical heart valves or other implanted medical devices like a pacemaker, defibrillator, or continuous glucose monitor? No   16. Do you have artificial joints? YES - left total hip arthroplasty and bilateral sholder   17. Are you allergic to latex? No       Health Care Directive:  Patient has a Health Care Directive on file      Preoperative Review of :   reviewed - no record of controlled substances prescribed.      Status of Chronic Conditions:  See problem list for active medical problems.  Problems all longstanding and stable, except as noted/documented.  See ROS for pertinent symptoms related to these conditions.      Review of Systems  CONSTITUTIONAL: NEGATIVE for fever, chills, change in weight  INTEGUMENTARY/SKIN: NEGATIVE for worrisome rashes, moles or lesions  EYES: NEGATIVE for vision changes or irritation  ENT/MOUTH: NEGATIVE for ear, mouth and throat problems  RESP: ongoing cough/ post-nasal drip  BREAST: + right sided breast pain  CV: NEGATIVE for chest pain, palpitations + stable peripheral edema  GI: rectal discharge as above  + ostomy  : NEGATIVE for frequency, dysuria, or hematuria  MUSCULOSKELETAL: NEGATIVE for significant arthralgias or myalgia  NEURO: NEGATIVE for weakness, dizziness or paresthesias  ENDOCRINE: NEGATIVE for temperature intolerance, skin/hair changes  HEME: NEGATIVE for bleeding problems  PSYCHIATRIC: NEGATIVE for changes in mood or affect    Patient Active Problem List    Diagnosis Date Noted     Renal artery stenosis (H) 07/31/2020     Priority: Medium     CKD (chronic kidney disease) stage 3, GFR 30-59  ml/min 04/17/2019     Priority: Medium     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     Priority: Medium     Morbid obesity (H) 08/01/2018     Priority: Medium     Normocytic anemia 01/16/2017     Priority: Medium     Allergic dermatitis due to other chemical product 10/02/2016     Priority: Medium     Insufficiency, arterial, peripheral (H) 11/08/2015     Priority: Medium     Mild seen on CARMITA 11/2015 - right sided       Type 2 diabetes mellitus with other specified complication (H) 10/18/2015     Priority: Medium     Carotid stenosis 12/09/2014     Priority: Medium     Mild increased stenosis 50-69% left ICA       Right shoulder pain 12/09/2014     Priority: Medium     Left knee pain 11/03/2014     Priority: Medium     Poliomyelitis      Priority: Medium     poor circulation right leg       Diabetic gastroparesis (H)      Priority: Medium     Dermatitis 02/15/2014     Priority: Medium     Acne rosacea 02/15/2014     Priority: Medium     Esophageal reflux 01/23/2014     Priority: Medium     Had upper endoscopy - Dr. Pantoja 2013       Pulmonary nodule 09/06/2013     Priority: Medium     Alopecia 02/09/2013     Priority: Medium     Problem list name updated by automated process. Provider to review       Papillary carcinoma of thyroid (H)      Priority: Medium     s/p thyroidectomy - Ruegemer       Gastroparesis 11/12/2012     Priority: Medium     Postsurgical hypothyroidism      Priority: Medium     [facility]  Concern for possible recurrence 03/2014       Type 2 diabetes, HbA1c goal < 7% (H)      Priority: Medium     ACP (advance care planning) 09/21/2012     Priority: Medium     Discussed advance care planning with patient; information given to patient to review. 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 Provider to review       ANNETTE (obstructive sleep apnea)      Priority: Medium     Mixed hyperlipidemia 10/31/2010     Priority: Medium     Low back pain 07/15/2009     Priority: Medium      Past Medical History:   Diagnosis Date     Abdominal adhesions 1984, 96,99    s/p lysis     Abdominal adhesions      Acne rosacea      Allergic rhinitis      Allergic rhinitis, cause unspecified      Alopecia      Anemia      CAD (coronary artery disease)      Carotid stenosis      CKD (chronic kidney disease) stage 2, GFR 60-89 ml/min      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)      CPAP (continuous positive airway pressure) dependence      Depression      Diabetic gastroparesis (H)      Diet-controlled type 2 diabetes mellitus (H)      DM2 (diabetes mellitus, type 2) (H)      DVT (deep venous thrombosis) (H)      DVT of axillary vein, acute right (H)      Fibromyalgia      Gastro-oesophageal reflux disease      GERD (gastroesophageal reflux disease)      Hernia of unspecified site of abdominal cavity without mention of obstruction or gangrene     bilateral     History of blood transfusion     10/ 1980     HTN (hypertension)      HTN (hypertension)      Hypertriglyceridemia      Hypertriglyceridemia      Hypokalemia      Hypothyroidism      Obstructive sleep apnea      ANNETTE (obstructive sleep apnea)      ANNETTE on CPAP      Osteoarthritis of glenohumeral joint      Papillary carcinoma of thyroid (H)     s/p thyroidectomy - Ruegemer     Papillary carcinoma of thyroid (H)      PE (pulmonary embolism)      Poliomyelitis     poor circulation right leg     Poliomyelitis      Postsurgical hypothyroidism     s/p papillary thryoid cancer - Ruegemer     Pulmonary  embolism (H)      Pulmonary embolus (H)      Pulmonary nodule      Rosacea      S/P carpal tunnel release     bilateral     S/P hardware removal 01/2014    still with lingering foot pain     S/P shoulder surgery     bilateral     Septic joint (H)     right knee     Septic joint of right knee joint (H)      Venous insufficiency      Venous insufficiency      Venous thrombosis 1999    right axillary vein     Past Surgical History:   Procedure Laterality Date     AMPUTATE TOE(S)  3/15/2012    Procedure:AMPUTATE TOE(S); Surgeon:RUBEN MOSES; Location: OR     AMPUTATE TOE(S)       APPENDECTOMY  1972     APPENDECTOMY       ARTHRODESIS FOOT  3/15/2012    Procedure:ARTHRODESIS FOOT; RIGHT TRIPLE ARTHRODESIS, FIFTH TOE AMPUTATION, LATERAL LIGAMENT RECONSTRUCTION, TENDON TRANSFER AND RELEASE [MINI C-ARM, ARTHREX 4.5 AND 6.7 STAPLES, BIOCOMPOSITE TENODESIS SCREWS]; Surgeon:RUBEN MOSES; Location: OR     ARTHRODESIS FOOT Right     Right foot triple arthrodesis and removal of hardware     ARTHROSCOPY SHOULDER  06/25/2015    REVISION SUBACROMIAL DECOMPRESSION, EXCISION OF GANGLION CYST, DEBRIDEMENT AND EXCISION OF THE GLENOHUMERAL JOINT GANGLION CYST, CORACOID DECOMPRESSION, POSSIBLE SUBSCAPULARIS REPAIR AND OPEN SUBSCAPULARIS BICEP     ARTHROSCOPY SHOULDER ROTATOR CUFF REPAIR       BIOPSY  Thyroid 2002     BREAST SURGERY  Biopsy     C FREEING BOWEL ADHESION,ENTEROLYSIS      1986, 1996, 1999     C TOTAL ABDOM HYSTERECTOMY  1980    + BSO     CHOLECYSTECTOMY       CHOLECYSTECTOMY       COLONOSCOPY  2018     COLOSTOMY  2/7/2012    Procedure:COLOSTOMY; CREATION OF SIGMOID COLOSTOMY AND EXTENSIVE  LYSIS OF ADHESIONS; Surgeon:MONTSERRAT BENDER P; Location: OR     COLOSTOMY       EYE SURGERY       GENITOURINARY SURGERY  1999     GI SURGERY      weakened rectal sphincter with artificial stimulator     HERNIA REPAIR  1976     HYSTERECTOMY TOTAL ABDOMINAL       LAPAROTOMY, LYSIS ADHESIONS, COMBINED  2/7/2012     Procedure:COMBINED LAPAROTOMY, LYSIS ADHESIONS; Surgeon:MONTSERRAT BENDER; Location:[facility] OR     RELEASE CARPAL TUNNEL       RELEASE TENDON FOOT  3/15/2012    Procedure:RELEASE TENDON FOOT; Surgeon:SUKHDEEP METZ; Location:[facility] OR     REMOVE HARDWARE FOOT  12/13/2012    Procedure: REMOVE HARDWARE FOOT;  RIGHT FOOT REMOVAL OF HARDWARE;  Surgeon: Sukhdeep Metz MD;  Location: [facility] OR     SHOULDER SURGERY  11/12/2020    LEFT SHOULDER HEMIARHTROPLASTY, BICEP TENODESIS     SOFT TISSUE SURGERY  2018, 2020     TENDON RELEASE      foot     THYROIDECTOMY       ZZC NONSPECIFIC PROCEDURE      throidectomy     Current Outpatient Medications   Medication Sig Dispense Refill     albuterol (PROAIR HFA/PROVENTIL HFA/VENTOLIN HFA) 108 (90 Base) MCG/ACT inhaler Inhale 2-4 puffs into the lungs every 2 hours as needed for shortness of breath / dyspnea (Patient not taking: Reported on 6/24/2021) 1 Inhaler 1     amLODIPine (NORVASC) 2.5 MG tablet Take 1 tablet (2.5 mg) by mouth 2 times daily 180 tablet 3     aspirin (SB LOW DOSE ASA EC) 81 MG EC tablet Take 81 mg by mouth daily       atenolol (TENORMIN) 50 MG tablet Take 1 tablet (50 mg) by mouth 2 times daily 180 tablet 3     azelastine (ASTEPRO) 0.15 % nasal spray 1 spray       Cholecalciferol (VITAMIN D-3 PO) Take 2 tablets by mouth       clotrimazole (LOTRIMIN) 1 % cream Apply topically daily       Continuous Blood Gluc Sensor (FREESTYLE BRANDY 14 DAY SENSOR) [facility] APPLY 1 SENSOR AND CHANGE EVERY 14 DAYS AS DIRECTED 2 each 2     diphenhydrAMINE-acetaminophen (TYLENOL PM)  MG tablet Take 1 tablet by mouth At Bedtime Reported on 3/20/2017       ezetimibe (ZETIA) 10 MG tablet Take 1 tablet (10 mg) by mouth daily 90 tablet 3     famotidine (PEPCID) 20 MG tablet Take 2 tablets (40 mg) by mouth as needed 180 tablet 3     fenofibrate (TRICOR) 145 MG tablet Take 1 tablet (145 mg) by mouth daily 90 tablet 3     ferrous sulfate (IRON) 325 (65 FE) MG tablet Take 325 mg by  mouth daily (with breakfast)       fexofenadine (ALLEGRA) 180 MG tablet Take 180 mg by mouth daily. 120 0     fluticasone (FLONASE) 50 MCG/ACT spray Spray 2 sprays in nostril daily 2 sprays in each nostril qd 1 Bottle 0     furosemide (LASIX) 20 MG tablet TAKE ONE TABLET (20MG) BY MOUTH ONCE DAILY; TAKE AN ADDITIONAL TABLET (20MG) IN THE AFTERNOON ON MONDAY, WEDNESDAY AND FRIDAY 135 tablet 0     gabapentin (NEURONTIN) 100 MG capsule Take 1 capsule (100 mg) by mouth every evening as needed 90 capsule 3     levothyroxine (SYNTHROID) 112 MCG tablet Take 112 mcg by mouth daily Take 112 mcg daily except for Friday takes only 56 mcg.  Brand name Synthroid       lisinopril (ZESTRIL) 40 MG tablet TAKE ONE TABLET BY MOUTH ONCE DAILY 90 tablet 3     metoclopramide (REGLAN) 5 MG tablet Take 1-2 tablets (5-10 mg) by mouth 2 times daily as needed a 120 tablet 0     MULTIVITAMINS OR TABS ONE DAILY 100 3     nitroGLYcerin (NITROSTAT) 0.4 MG sublingual tablet Place 1 tablet (0.4 mg) under the tongue every 5 minutes as needed for chest pain (no more than 3 in one hour; after 3rd, call 911.) 25 tablet 3     nystatin (MYCOSTATIN) cream Apply topically daily as needed        nystatin-triamcinolone (MYCOLOG II) cream Apply topically daily as needed       ondansetron (ZOFRAN) 4 MG tablet Take 1 tablet by mouth every 6 hours as needed Reported on 3/20/2017       order for DME Equipment being ordered: Compression stockings - Knee High; 20-30 mmHg compression - note would like adhesive band to keep the stocking from sliding down 3 each 0     order for DME Equipment being ordered: Oral appliance for sleep apnea 1 Units 0     pantoprazole (PROTONIX) 40 MG EC tablet Take 40 mg by mouth 2 times daily       sitagliptin (JANUVIA) 50 MG tablet Januvia 50 mg tablet       TRESIBA FLEXTOUCH 100 UNIT/ML pen Inject 5 Units Subcutaneous daily       tretinoin (RETIN-A) 0.025 % external cream Use every night as tolerated - spot treat lesion 20 g 3        Allergies   Allergen Reactions     Nsaids Difficulty breathing     Increased creatinine     Toradol Difficulty breathing     Shortness of breath     Celecoxib Itching and Rash     Codeine Itching     With higher doses     Crestor [Rosuvastatin] Muscle Pain (Myalgia)     No Clinical Screening - See Comments Itching     Fragrance     Vioxx Other (See Comments)     Heart races     Conjugated Estrogens Rash     Sulfa Drugs Rash        Social History     Tobacco Use     Smoking status: Never Smoker     Smokeless tobacco: Never Used   Substance Use Topics     Alcohol use: Not Currently     Alcohol/week: 0.0 standard drinks     Family History   Problem Relation Age of Onset     Arthritis Mother      Hypertension Mother      Cerebrovascular Disease Mother      Obesity Mother      Heart Disease Mother         MI's     Hypertension Father      Respiratory Father         Adult RDS     Diabetes Father         adult     Arthritis Sister      Cancer Sister      Diabetes Sister      Hypertension Sister      Breast Cancer Sister      Depression Sister      Thyroid Disease Sister      Obesity Sister      Arthritis Sister      Hypertension Sister      Thyroid Disease Sister      Cancer Sister         colon polup     Heart Disease Brother         MI at 54     Other Cancer Brother      Hypertension Sister      Osteoporosis Sister      Obesity Sister      Lipids Brother      Hypertension Brother      Diabetes Brother      Hyperlipidemia Brother      Lipids Sister      Obesity Sister      Obesity Maternal Grandmother      Skin Cancer Maternal Grandmother         skin cancer unknown     Cancer Maternal Grandmother         unknown skin cancer on face     Obesity Paternal Grandmother      History   Drug Use No         Objective     BP (!) 185/71 (BP Location: Right arm, Patient Position: Sitting, Cuff Size: Adult Regular)   Pulse 56   Temp 97.7  F (36.5  C) (Temporal)   Ht 1.524 m (5')   Wt 89.4 kg (197 lb)   SpO2 100%   BMI  38.47 kg/m      Physical Exam    GENERAL APPEARANCE: healthy, alert and no distress     EYES: EOMI,  PERRL     HENT: ear canals and TM's normal and nose and mouth without ulcers or lesions     NECK: no adenopathy, no asymmetry, masses, or scars and thyroid normal to palpation     RESP: lungs clear to auscultation - no rales, rhonchi or wheezes     CV: regular rates and rhythm, normal S1 S2, no S3 or S4 and no murmur, click or rub     ABDOMEN:  soft, nontender, no HSM or masses and bowel sounds normal + ostomy     MS: right ankle in boot     SKIN: no suspicious lesions or rashes     NEURO: Normal strength and tone,     PSYCH: mentation appears normal. and affect normal/bright     LYMPHATICS: No cervical adenopathy    Recent Labs   Lab Test 07/07/21  1206 04/26/21  1256 02/16/21  1323 01/18/21  1719 11/30/20  0000 11/27/20  0512   HGB  --  11.7 11.5* 11.6*   < > 9.2*   PLT  --  193 232 125*   < > 253     --   --  141   < >  --    POTASSIUM 4.7  --   --  4.5   < >  --    CR 1.01  --   --  1.03   < >  --    A1C 8.7*  --   --   --   --  6.2*    < > = values in this interval not displayed.        Diagnostics:  Recent Results (from the past 24 hour(s))   Hemoglobin A1c    Collection Time: 08/02/21  3:11 PM   Result Value Ref Range    Hemoglobin A1C 9.0 (H) 0.0 - 5.6 %   CBC with platelets and differential    Collection Time: 08/02/21  3:11 PM   Result Value Ref Range    WBC Count 8.4 4.0 - 11.0 10e3/uL    RBC Count 3.90 3.80 - 5.20 10e6/uL    Hemoglobin 12.1 11.7 - 15.7 g/dL    Hematocrit 35.1 35.0 - 47.0 %    MCV 90 78 - 100 fL    MCH 31.0 26.5 - 33.0 pg    MCHC 34.5 31.5 - 36.5 g/dL    RDW 12.4 10.0 - 15.0 %    Platelet Count 214 150 - 450 10e3/uL    % Neutrophils 62 %    % Lymphocytes 26 %    % Monocytes 8 %    % Eosinophils 3 %    % Basophils 1 %    Absolute Neutrophils 5.3 1.6 - 8.3 10e3/uL    Absolute Lymphocytes 2.2 0.8 - 5.3 10e3/uL    Absolute Monocytes 0.7 0.0 - 1.3 10e3/uL    Absolute Eosinophils 0.2 0.0 -  0.7 10e3/uL    Absolute Basophils 0.0 0.0 - 0.2 10e3/uL      EKG: sinus bradycardia, normal axis, normal intervals, no acute ST/T changes c/w ischemia, no LVH by voltage criteria, unchanged from previous tracings    Revised Cardiac Risk Index (RCRI):  The patient has the following serious cardiovascular risks for perioperative complications:   - Diabetes Mellitus (on Insulin) = 1 point     RCRI Interpretation: 1 point: Class II (low risk - 0.9% complication rate)    Patient Instructions   (Z01.818) Preop general physical exam  (primary encounter diagnosis)  Comment: you are medically optimized for your upcoming procedure pending labs.  Hold aspirin for 1 week.  No need for any anticoagulation.  Recommend taking 1/2 usually dose of Insulin on the AM of surgery and avoid Januvia. Hold furosemide and lisinioprili on the day of surgery   Plan: EKG 12-lead complete w/read - Clinics, CBC with        platelets and differential, Comprehensive         metabolic panel (BMP + Alb, Alk Phos, ALT, AST,        Total. Bili, TP), [facility]         Bilateral, CBC with platelets and differential,        Comprehensive metabolic panel (BMP + Alb, Alk         Phos, ALT, AST, Total. Bili, TP)            (I10) Benign essential hypertension  Comment: blood pressure is elevated - we will increase amlodipine to 5 mg twice per day and follow up in 3-4 weeks   Plan:     (I10) Essential hypertension  Comment: as above   Plan:     (E78.2) Mixed hyperlipidemia  Comment: Cotinue on statin   Plan:     (R60.0) Lower extremity edema  Comment: no issues   Plan:     (N18.30) CKD (chronic kidney disease) stage 3, GFR 30-59 ml/min  Comment: Check labs today   Plan:     (G47.33) ANNETTE (obstructive sleep apnea)  Comment: Continue use of CPAP  Plan:     (R19.8) Rectal discharge  Comment: Plan for surgery   Plan:     (N64.4) Breast pain, left  Comment: Diagnositic mammogram ordered   [facility] (also performs diagnostic  mammogram, ultrasound and biopsy) [phone].   Plan: [facility] Bilateral            (E11.9) Type 2 diabetes, HbA1c goal < 7% (H)  Comment: check hemoglobin A1c today and continue current medications   Plan:               Signed Electronically by: Shola Benoit MD, MD  Copy of this evaluation report is provided to requesting physician.

## 2021-08-02 NOTE — PATIENT INSTRUCTIONS
(Z01.818) Preop general physical exam  (primary encounter diagnosis)  Comment: you are medically optimized for your upcoming procedure pending labs.  Hold aspirin for 1 week.  No need for any anticoagulation.  Recommend taking 1/2 usually dose of Insulin on the AM of surgery and avoid Januvia. Hold furosemide and lisinioprili on the day of surgery   Plan: EKG 12-lead complete w/read - Clinics, CBC with        platelets and differential, Comprehensive         metabolic panel (BMP + Alb, Alk Phos, ALT, AST,        Total. Bili, TP), [facility]         Bilateral, CBC with platelets and differential,        Comprehensive metabolic panel (BMP + Alb, Alk         Phos, ALT, AST, Total. Bili, TP)            (I10) Benign essential hypertension  Comment: blood pressure is elevated - we will increase amlodipine to 5 mg twice per day and follow up in 3-4 weeks   Plan:     (I10) Essential hypertension  Comment: as above   Plan:     (E78.2) Mixed hyperlipidemia  Comment: Cotinue on statin   Plan:     (R60.0) Lower extremity edema  Comment: no issues   Plan:     (N18.30) CKD (chronic kidney disease) stage 3, GFR 30-59 ml/min  Comment: Check labs today   Plan:     (G47.33) ANNETTE (obstructive sleep apnea)  Comment: Continue use of CPAP  Plan:     (R19.8) Rectal discharge  Comment: Plan for surgery   Plan:     (N64.4) Breast pain, left  Comment: Diagnositic mammogram ordered   [facility] (also performs diagnostic mammogram, ultrasound and biopsy) [phone].   Plan: [facility] Bilateral            (E11.9) Type 2 diabetes, HbA1c goal < 7% (H)  Comment: check hemoglobin A1c today and continue current medications   Plan:

## 2021-08-03 ENCOUNTER — TRANSFERRED RECORDS (OUTPATIENT)
Dept: HEALTH INFORMATION MANAGEMENT | Facility: CLINIC | Age: 76
End: 2021-08-03

## 2021-08-03 LAB
ALBUMIN SERPL-MCNC: 3.6 G/DL (ref 3.4–5)
ALP SERPL-CCNC: 51 U/L (ref 40–150)
ALT SERPL W P-5'-P-CCNC: 24 U/L (ref 0–50)
ANION GAP SERPL CALCULATED.3IONS-SCNC: 5 MMOL/L (ref 3–14)
AST SERPL W P-5'-P-CCNC: 27 U/L (ref 0–45)
BILIRUB SERPL-MCNC: 0.4 MG/DL (ref 0.2–1.3)
BUN SERPL-MCNC: 28 MG/DL (ref 7–30)
CALCIUM SERPL-MCNC: 9.7 MG/DL (ref 8.5–10.1)
CHLORIDE BLD-SCNC: 105 MMOL/L (ref 94–109)
CO2 SERPL-SCNC: 25 MMOL/L (ref 20–32)
CREAT SERPL-MCNC: 1 MG/DL (ref 0.52–1.04)
GFR SERPL CREATININE-BSD FRML MDRD: 56 ML/MIN/1.73M2
GLUCOSE BLD-MCNC: 183 MG/DL (ref 70–99)
POTASSIUM BLD-SCNC: 4.6 MMOL/L (ref 3.4–5.3)
PROT SERPL-MCNC: 7 G/DL (ref 6.8–8.8)
SODIUM SERPL-SCNC: 135 MMOL/L (ref 133–144)
TSH SERPL DL<=0.005 MIU/L-ACNC: 1.61 MU/L (ref 0.4–4)

## 2021-08-05 NOTE — RESULT ENCOUNTER NOTE
Gerard Gaspar,    I have had the opportunity to review your recent results and an interpretation is as follows:  Your comprehensive metabolic panel shows stable renal function and liver function  Your blood counts also returned within normal limits  Your A1c shows stable average blood glucose, and follow-up in endocrinology is recommended  Your thyroid function remained stable as well      Sincerely,  Shola Benoit MD

## 2021-08-13 ENCOUNTER — HOSPITAL ENCOUNTER (OUTPATIENT)
Dept: MAMMOGRAPHY | Facility: CLINIC | Age: 76
End: 2021-08-13
Attending: INTERNAL MEDICINE
Payer: MEDICARE

## 2021-08-13 DIAGNOSIS — N64.4 BREAST PAIN, LEFT: ICD-10-CM

## 2021-08-13 DIAGNOSIS — Z01.818 PREOP GENERAL PHYSICAL EXAM: ICD-10-CM

## 2021-08-13 PROCEDURE — 76642 ULTRASOUND BREAST LIMITED: CPT | Mod: 50

## 2021-08-13 PROCEDURE — 77062 BREAST TOMOSYNTHESIS BI: CPT

## 2021-08-16 ENCOUNTER — OFFICE VISIT (OUTPATIENT)
Dept: DERMATOLOGY | Facility: CLINIC | Age: 76
End: 2021-08-16
Payer: MEDICARE

## 2021-08-16 DIAGNOSIS — L72.0 MILIA: ICD-10-CM

## 2021-08-16 DIAGNOSIS — L30.9 DERMATITIS: Primary | ICD-10-CM

## 2021-08-16 DIAGNOSIS — L65.9 LOSS OF HAIR: ICD-10-CM

## 2021-08-16 PROCEDURE — 99213 OFFICE O/P EST LOW 20 MIN: CPT | Mod: 25 | Performed by: DERMATOLOGY

## 2021-08-16 PROCEDURE — 10040 EXTRACTION: CPT | Mod: GC | Performed by: DERMATOLOGY

## 2021-08-16 RX ORDER — FLUOCINOLONE ACETONIDE 0.11 MG/ML
OIL TOPICAL
Qty: 118.28 ML | Refills: 5 | Status: SHIPPED | OUTPATIENT
Start: 2021-08-16 | End: 2024-08-18

## 2021-08-16 ASSESSMENT — PAIN SCALES - GENERAL: PAINLEVEL: NO PAIN (0)

## 2021-08-16 NOTE — PROGRESS NOTES
[facility] Dermatology Note  Encounter Date: Aug 16, 2021  Office Visit     Dermatology Problem List:  #. Female pattern hair loss  - Current tx: laser comb up to 3x weekly (switching to band 8/2021), free and clear shampoo, fluocinolone acetonide 0.01% oil once per week  - Previously on 1/2 tab of minoxidil 2.5 mg daily, discontinued secondary to elevated creatinine 10/2020, has since stabilized  -Baseline Hair Metrix 8/16/2021   #. Tinea corporis or candida/yeast infection - resolved  - Nystatin powder, OTC lotrimin cream   #. Rosacea - stable  - Vanicream  #. Venous stasis dermatitis - stable  - Vanicream  #. Allergic contact dermatitis - stable  - Uses fragrance-free products  - Patch testing revealed mild reaction to Balsam of [place], fragrance mix, and a strong reaction to paraphenylenediamine.   #. Xerosis cutis  -Recommend continuing use of a gentle, fragrance-free emollient based moisturizer such as Vanicream or CeraVe.   #. Abrasion , right upper arm 02/15/2021.   - Monitor, photo taken today.   #. Milia, right lateral eye  - Removed 8/16/2021   - prior tx: tretinoin  ____________________________________________    Assessment & Plan:     #. Female pattern hair loss- stable.  # Hx of allergic contact dermatitis of scalp  Hair loss is overall stable but has some scalp itchiness and dryness. Says she has been good about avoiding her allergens. Current regimen is LLLT comb 3x/wk and free and clear shampoo. Patient purchased the LLLT band and plans to switch to that. Will do HairMetrix today so we can compare comb vs band in the future.   - start fluocinolone 0.01% oil once per week for scalp itchiness and dryness - reminded to cross check this with her allergy list   - Continue to use laser headband or comb at least 3 times per week.  - Continue to use of Free & Clear shampoo.  - Photodocumentation obtained today.  - Hair Metrix performed today    #. Milia, right lateral eye.  - Tretinoin  "0.025% provided at last visit, spot has still not resolved. Given lack of resolution with tretinoin, we discussed removal and patient states she would like to do this today.   - 1 milia removed (see proc note below)    Procedures Performed:   Milia on R periorbital cheek was wiped with isopropyl alcohol then the overlying epidermis was gently opened with an 11 blade. Keratinous material was then expressed with a comedone expressor. Patient tolerated the procedure well without complication.     Follow-up: 8-9 month(s) or earlier for new or changing lesions    Staff/Scribe/Resident:     Scribe Disclosure:  I, Luz Cr, am serving as a scribe to document services personally performed by Renetta Meyer MD based on data collection and the provider's statements to me.     Floresita Chew MD  Dermatology Resident, PGY3    Provider Disclosure:   The documentation recorded by the scribe accurately reflects the services I personally performed and the decisions made by me.    Patient was seen and examined with the medicine/dermatology resident. I agree with the history, review of systems, physical examination, assessments and plan. I was present for the key portion of the milium extraction procedure.    Renetta Meyer MD  Professor and  Chair  Department of Dermatology  [facility]  ____________________________________________    CC: Hair Loss (Here for hair loss and patient indicates that she has \"more\" hair loss)    HPI:  Ms. Chasity Hodgson is a 75 year old female who presents as a return patient for follow-up of hair loss, diagnosed as female pattern hair loss.   - Last seen in-clinic on 2/15/21  - reports global increase in shedding since last visit  - Current tx: laser headband/comb 3x/wk, Free & Clear shampoo    No Any new medications, supplements, or products? (please list below)     No Scalp pain   No Scalp burning   Yes Scalp itching    Yes Eyebrow changes - feels they are thin   No " Eyelash changes     - Overall course: The patient reports increased hair loss since her last visit. She states that more hair is falling out now than what is normal for her, primarily in little clumps but also significant loss when brushing. She also feels that her eyebrows are slightly thinner. She has been using only Free & Clear shampoo and continues to use the laser comb 3 times per week. Says she purchased the laser band and plans to switch to this.     Also reports that the milia by the R eye has not gone away with the tretinoin. Desires removal today.     ROS: As per HPI    Labs:  None reviewed.    Physical Exam:  Vitals: There were no vitals taken for this visit.  GEN: Well developed, well-nourished, in no acute distress, in a pleasant mood.    SKIN: Focused examination of scalp and upper face was performed.  - 2 mm pearly white hyperkeratotic papule on R periorbital cheek   - Blayne part width of 1.2, posterior scalp 1.1   - The layers of hair regrowth layers were noted to be  - 1 cm for the first  - 2-3 cm at the second  - 4-5 cm at the third  - Increased fine grey hairs on frontal scalp  - Normal eyelash density  - In comparison to prior photographs, scalp is stable  - No other lesions of concern on areas examined.                     Medications:  Current Outpatient Medications   Medication     albuterol (PROAIR HFA/PROVENTIL HFA/VENTOLIN HFA) 108 (90 Base) MCG/ACT inhaler     amLODIPine (NORVASC) 5 MG tablet     aspirin (SB LOW DOSE ASA EC) 81 MG EC tablet     atenolol (TENORMIN) 50 MG tablet     azelastine (ASTEPRO) 0.15 % nasal spray     Cholecalciferol (VITAMIN D-3 PO)     clotrimazole (LOTRIMIN) 1 % cream     Continuous Blood Gluc Sensor (FREESTYLE BRANDY 14 DAY SENSOR) MISC     diphenhydrAMINE-acetaminophen (TYLENOL PM)  MG tablet     ezetimibe (ZETIA) 10 MG tablet     famotidine (PEPCID) 20 MG tablet     fenofibrate (TRICOR) 145 MG tablet     ferrous sulfate (IRON) 325 (65 FE) MG tablet      fexofenadine (ALLEGRA) 180 MG tablet     fluocinolone acetonide (DERMA SMOOTHE/FS BODY) 0.01 % external oil     fluticasone (FLONASE) 50 MCG/ACT spray     furosemide (LASIX) 20 MG tablet     gabapentin (NEURONTIN) 100 MG capsule     levothyroxine (SYNTHROID) 112 MCG tablet     lisinopril (ZESTRIL) 40 MG tablet     metoclopramide (REGLAN) 5 MG tablet     MULTIVITAMINS OR TABS     nitroGLYcerin (NITROSTAT) 0.4 MG sublingual tablet     nystatin (MYCOSTATIN) cream     nystatin-triamcinolone (MYCOLOG II) cream     ondansetron (ZOFRAN) 4 MG tablet     order for DME     pantoprazole (PROTONIX) 40 MG EC tablet     sitagliptin (JANUVIA) 50 MG tablet     TRESIBA FLEXTOUCH 100 UNIT/ML pen     tretinoin (RETIN-A) 0.025 % external cream     order for DME     No current facility-administered medications for this visit.      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)     Anemia due to blood loss, acute     ACP (advance care planning)     Postsurgical hypothyroidism     Type 2 diabetes, HbA1c goal < 7% (H)     Gastroparesis     Papillary carcinoma of thyroid (H)     Alopecia     Pulmonary nodule     Esophageal reflux     Dermatitis     Acne rosacea     Diabetic gastroparesis (H)     Poliomyelitis     Left knee pain     Carotid stenosis     Right shoulder pain     Type 2 diabetes mellitus with other specified complication (H)     Insufficiency, arterial, peripheral (H)     Allergic dermatitis due to other chemical product     Normocytic anemia     Morbid obesity (H)     Mild major depression (H)     CKD (chronic kidney disease) stage 3, GFR 30-59 ml/min     Renal artery stenosis (H)     Past Medical History:   Diagnosis Date     Abdominal adhesions  1984, 96,99    s/p lysis     Abdominal adhesions      Acne rosacea      Allergic rhinitis      Allergic rhinitis, cause unspecified      Alopecia      Anemia      CAD (coronary artery disease)      Carotid stenosis      CKD (chronic kidney disease) stage 2, GFR 60-89 ml/min      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)      CPAP (continuous positive airway pressure) dependence      Depression      Diabetic gastroparesis (H)      Diet-controlled type 2 diabetes mellitus (H)      DM2 (diabetes mellitus, type 2) (H)      DVT (deep venous thrombosis) (H)      DVT of axillary vein, acute right (H)      Fibromyalgia      Gastro-oesophageal reflux disease      GERD (gastroesophageal reflux disease)      Hernia of unspecified site of abdominal cavity without mention of obstruction or gangrene     bilateral     History of blood transfusion     10/ 1980     HTN (hypertension)      HTN (hypertension)      Hypertriglyceridemia      Hypertriglyceridemia      Hypokalemia      Hypothyroidism      Obstructive sleep apnea      ANNETTE (obstructive sleep apnea)      ANNETTE on CPAP      Osteoarthritis of glenohumeral joint      Papillary carcinoma of thyroid (H)     s/p thyroidectomy - Ruegemer     Papillary carcinoma of thyroid (H)      PE (pulmonary embolism)      Poliomyelitis     poor circulation right leg     Poliomyelitis      Postsurgical hypothyroidism     s/p papillary thryoid cancer - Ruegemer     Pulmonary embolism (H)      Pulmonary embolus (H)      Pulmonary nodule      Rosacea      S/P carpal tunnel release     bilateral     S/P hardware removal 01/2014    still with lingering foot pain     S/P shoulder surgery     bilateral     Septic joint (H)     right knee     Septic joint of right knee joint (H)      Venous insufficiency      Venous insufficiency      Venous thrombosis 1999    right axillary vein       CC Shola Benoit MD  [address]  [place] [address] on close of this encounter.

## 2021-08-16 NOTE — LETTER
8/16/2021       RE: Chasity Hodgson  [address] [address] [facility] [address]     Dear Colleague,    Thank you for referring your patient, Chasity Hodgson, to the [facility] DERMATOLOG[facility] [place] at [facility]. Please see a copy of my visit note below.    [facility] Dermatology Note  Encounter Date: Aug 16, 2021  Office Visit     Dermatology Problem List:  #. Female pattern hair loss  - Current tx: laser comb up to 3x weekly (switching to band 8/2021), free and clear shampoo, fluocinolone acetonide 0.01% oil once per week  - Previously on 1/2 tab of minoxidil 2.5 mg daily, discontinued secondary to elevated creatinine 10/2020, has since stabilized  -Baseline Hair Metrix 8/16/2021   #. Tinea corporis or candida/yeast infection - resolved  - Nystatin powder, OTC lotrimin cream   #. Rosacea - stable  - Vanicream  #. Venous stasis dermatitis - stable  - Vanicream  #. Allergic contact dermatitis - stable  - Uses fragrance-free products  - Patch testing revealed mild reaction to Balsam of [place], fragrance mix, and a strong reaction to paraphenylenediamine.   #. Xerosis cutis  -Recommend continuing use of a gentle, fragrance-free emollient based moisturizer such as Vanicream or CeraVe.   #. Abrasion , right upper arm 02/15/2021.   - Monitor, photo taken today.   #. Milia, right lateral eye  - Removed 8/16/2021   - prior tx: tretinoin  ____________________________________________    Assessment & Plan:     #. Female pattern hair loss- stable.  # Hx of allergic contact dermatitis of scalp  Hair loss is overall stable but has some scalp itchiness and dryness. Says she has been good about avoiding her allergens. Current regimen is LLLT comb 3x/wk and free and clear shampoo. Patient purchased the LLLT band and plans to switch to that. Will do HairMetrix today so we can compare comb vs band in the future.   - start fluocinolone 0.01% oil once per week for  "scalp itchiness and dryness - reminded to cross check this with her allergy list   - Continue to use laser headband or comb at least 3 times per week.  - Continue to use of Free & Clear shampoo.  - Photodocumentation obtained today.  - Hair Metrix performed today    #. Milia, right lateral eye.  - Tretinoin 0.025% provided at last visit, spot has still not resolved. Given lack of resolution with tretinoin, we discussed removal and patient states she would like to do this today.   - 1 milia removed (see proc note below)    Procedures Performed:   Milia on R periorbital cheek was wiped with isopropyl alcohol then the overlying epidermis was gently opened with an 11 blade. Keratinous material was then expressed with a comedone expressor. Patient tolerated the procedure well without complication.     Follow-up: 8-9 month(s) or earlier for new or changing lesions    Staff/Scribe/Resident:     Scribe Disclosure:  I, Luz Cr, am serving as a scribe to document services personally performed by Renetta Meyer MD based on data collection and the provider's statements to me.     Floresita Chew MD  Dermatology Resident, PGY3    Provider Disclosure:   The documentation recorded by the scribe accurately reflects the services I personally performed and the decisions made by me.    Patient was seen and examined with the medicine/dermatology resident. I agree with the history, review of systems, physical examination, assessments and plan. I was present for the key portion of the milium extraction procedure.    Renetta Meyer MD  Professor and  Chair  Department of Dermatology  [facility]  ____________________________________________    CC: Hair Loss (Here for hair loss and patient indicates that she has \"more\" hair loss)    HPI:  Ms. Chasity Hodgson is a 75 year old female who presents as a return patient for follow-up of hair loss, diagnosed as female pattern hair loss.   - Last seen in-clinic " on 2/15/21  - reports global increase in shedding since last visit  - Current tx: laser headband/comb 3x/wk, Free & Clear shampoo    No Any new medications, supplements, or products? (please list below)     No Scalp pain   No Scalp burning   Yes Scalp itching    Yes Eyebrow changes - feels they are thin   No Eyelash changes     - Overall course: The patient reports increased hair loss since her last visit. She states that more hair is falling out now than what is normal for her, primarily in little clumps but also significant loss when brushing. She also feels that her eyebrows are slightly thinner. She has been using only Free & Clear shampoo and continues to use the laser comb 3 times per week. Says she purchased the laser band and plans to switch to this.     Also reports that the milia by the R eye has not gone away with the tretinoin. Desires removal today.     ROS: As per HPI    Labs:  None reviewed.    Physical Exam:  Vitals: There were no vitals taken for this visit.  GEN: Well developed, well-nourished, in no acute distress, in a pleasant mood.    SKIN: Focused examination of scalp and upper face was performed.  - 2 mm pearly white hyperkeratotic papule on R periorbital cheek   - Blayne part width of 1.2, posterior scalp 1.1   - The layers of hair regrowth layers were noted to be  - 1 cm for the first  - 2-3 cm at the second  - 4-5 cm at the third  - Increased fine grey hairs on frontal scalp  - Normal eyelash density  - In comparison to prior photographs, scalp is stable  - No other lesions of concern on areas examined.                     Medications:  Current Outpatient Medications   Medication     albuterol (PROAIR HFA/PROVENTIL HFA/VENTOLIN HFA) 108 (90 Base) MCG/ACT inhaler     amLODIPine (NORVASC) 5 MG tablet     aspirin (SB LOW DOSE ASA EC) 81 MG EC tablet     atenolol (TENORMIN) 50 MG tablet     azelastine (ASTEPRO) 0.15 % nasal spray     Cholecalciferol (VITAMIN D-3 PO)     clotrimazole (LOTRIMIN)  1 % cream     Continuous Blood Gluc Sensor (FREESTYLE BRANDY 14 DAY SENSOR) MISC     diphenhydrAMINE-acetaminophen (TYLENOL PM)  MG tablet     ezetimibe (ZETIA) 10 MG tablet     famotidine (PEPCID) 20 MG tablet     fenofibrate (TRICOR) 145 MG tablet     ferrous sulfate (IRON) 325 (65 FE) MG tablet     fexofenadine (ALLEGRA) 180 MG tablet     fluocinolone acetonide (DERMA SMOOTHE/FS BODY) 0.01 % external oil     fluticasone (FLONASE) 50 MCG/ACT spray     furosemide (LASIX) 20 MG tablet     gabapentin (NEURONTIN) 100 MG capsule     levothyroxine (SYNTHROID) 112 MCG tablet     lisinopril (ZESTRIL) 40 MG tablet     metoclopramide (REGLAN) 5 MG tablet     MULTIVITAMINS OR TABS     nitroGLYcerin (NITROSTAT) 0.4 MG sublingual tablet     nystatin (MYCOSTATIN) cream     nystatin-triamcinolone (MYCOLOG II) cream     ondansetron (ZOFRAN) 4 MG tablet     order for DME     pantoprazole (PROTONIX) 40 MG EC tablet     sitagliptin (JANUVIA) 50 MG tablet     TRESIBA FLEXTOUCH 100 UNIT/ML pen     tretinoin (RETIN-A) 0.025 % external cream     order for DME     No current facility-administered medications for this visit.      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)     Anemia due to blood loss, acute     ACP (advance care planning)     Postsurgical hypothyroidism     Type 2 diabetes, HbA1c goal < 7% (H)     Gastroparesis     Papillary carcinoma of thyroid (H)     Alopecia     Pulmonary nodule     Esophageal reflux     Dermatitis     Acne rosacea     Diabetic gastroparesis (H)     Poliomyelitis     Left knee pain     Carotid stenosis     Right shoulder pain     Type 2 diabetes mellitus with other specified complication (H)     Insufficiency,  arterial, peripheral (H)     Allergic dermatitis due to other chemical product     Normocytic anemia     Morbid obesity (H)     Mild major depression (H)     CKD (chronic kidney disease) stage 3, GFR 30-59 ml/min     Renal artery stenosis (H)     Past Medical History:   Diagnosis Date     Abdominal adhesions 1984, 96,99    s/p lysis     Abdominal adhesions      Acne rosacea      Allergic rhinitis      Allergic rhinitis, cause unspecified      Alopecia      Anemia      CAD (coronary artery disease)      Carotid stenosis      CKD (chronic kidney disease) stage 2, GFR 60-89 ml/min      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)      CPAP (continuous positive airway pressure) dependence      Depression      Diabetic gastroparesis (H)      Diet-controlled type 2 diabetes mellitus (H)      DM2 (diabetes mellitus, type 2) (H)      DVT (deep venous thrombosis) (H)      DVT of axillary vein, acute right (H)      Fibromyalgia      Gastro-oesophageal reflux disease      GERD (gastroesophageal reflux disease)      Hernia of unspecified site of abdominal cavity without mention of obstruction or gangrene     bilateral     History of blood transfusion     10/ 1980     HTN (hypertension)      HTN (hypertension)      Hypertriglyceridemia      Hypertriglyceridemia      Hypokalemia      Hypothyroidism      Obstructive sleep apnea      ANNETTE (obstructive sleep apnea)      ANNETTE on CPAP      Osteoarthritis of glenohumeral joint      Papillary carcinoma of thyroid (H)     s/p thyroidectomy - Ruegemer     Papillary carcinoma of thyroid (H)      PE (pulmonary embolism)      Poliomyelitis     poor circulation right leg     Poliomyelitis      Postsurgical hypothyroidism     s/p papillary thryoid cancer - Ruegemer     Pulmonary embolism (H)      Pulmonary embolus (H)      Pulmonary nodule      Rosacea      S/P carpal tunnel release     bilateral     S/P hardware removal 01/2014    still with lingering foot pain     S/P shoulder surgery     bilateral     Septic joint  (H)     right knee     Septic joint of right knee joint (H)      Venous insufficiency      Venous insufficiency      Venous thrombosis 1999    right axillary vein       CC Shola Benoit MD  [address] [facility] [address]  [place] [address] on close of this encounter.      Again, thank you for allowing me to participate in the care of your patient.      Sincerely,    Renetta Meyer MD

## 2021-08-16 NOTE — NURSING NOTE
"Dermatology Rooming Note    Chasity Hodgson's goals for this visit include:   Chief Complaint   Patient presents with     Hair Loss     Here for hair loss and patient indicates that she has \"more\" hair loss     Iva Zaman RN    "

## 2021-08-16 NOTE — PATIENT INSTRUCTIONS
Use the fluocinolone oil all over your scalp once per week. Massage onto scalp. Leave on overnight then wash out in the morning. You can cover with a shower cap overnight if desired. If you don't like to leave it on overnight, you can leave it in for 4-6 hours during the day before washing it out.     Double check to make sure the oil doesn't include any of your allergies (Balsam of [place], fragrance, paraphenylenediamine).

## 2021-08-21 ENCOUNTER — ANCILLARY PROCEDURE (OUTPATIENT)
Dept: GENERAL RADIOLOGY | Facility: CLINIC | Age: 76
End: 2021-08-21
Attending: PHYSICIAN ASSISTANT
Payer: MEDICARE

## 2021-08-21 ENCOUNTER — OFFICE VISIT (OUTPATIENT)
Dept: URGENT CARE | Facility: URGENT CARE | Age: 76
End: 2021-08-21
Payer: MEDICARE

## 2021-08-21 VITALS
WEIGHT: 196.2 LBS | DIASTOLIC BLOOD PRESSURE: 73 MMHG | TEMPERATURE: 97.6 F | HEART RATE: 56 BPM | BODY MASS INDEX: 38.32 KG/M2 | OXYGEN SATURATION: 99 % | SYSTOLIC BLOOD PRESSURE: 140 MMHG

## 2021-08-21 DIAGNOSIS — S99.921A FOOT INJURY, RIGHT, INITIAL ENCOUNTER: ICD-10-CM

## 2021-08-21 DIAGNOSIS — S99.921A FOOT INJURY, RIGHT, INITIAL ENCOUNTER: Primary | ICD-10-CM

## 2021-08-21 PROCEDURE — 99213 OFFICE O/P EST LOW 20 MIN: CPT | Performed by: PHYSICIAN ASSISTANT

## 2021-08-21 PROCEDURE — 73630 X-RAY EXAM OF FOOT: CPT | Mod: RT | Performed by: RADIOLOGY

## 2021-08-21 NOTE — PROGRESS NOTES
SUBJECTIVE:  Chief Complaint   Patient presents with     Urgent Care     Musculoskeletal Problem     8/19/21 twisted foot sx right foot pain, swelling, bruising, aches underneath big toe area, hx of polio- drop foot, tx Tylenol  elevation, AFO      Chasity Hodgson is a 75 year old female presents with a chief complaint of right foot pain.  The injury occurred 2 day(s) ago.    The injury happened while at home. How: twisted foot  States that has drop foot due to hx of polio  Has some swelling and pain and pain on underside of big toe.  Thinks that fine but wants to make sure   delayed pain, was able to bear weight directly after injury, no deformity was noted by the patient.  The patient complained of mild pain  and has not had decreased ROM.  Pain exacerbated by walking.  Relieved by rest and elevation.  She treated it initially with Tylenol. This is the first time this type of injury has occurred to this patient.     Past Medical History:   Diagnosis Date     Abdominal adhesions 1984, 96,99    s/p lysis     Abdominal adhesions      Acne rosacea      Allergic rhinitis      Allergic rhinitis, cause unspecified      Alopecia      Anemia      CAD (coronary artery disease)      Carotid stenosis      CKD (chronic kidney disease) stage 2, GFR 60-89 ml/min      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)      CPAP (continuous positive airway pressure) dependence      Depression      Diabetic gastroparesis (H)      Diet-controlled type 2 diabetes mellitus (H)      DM2 (diabetes mellitus, type 2) (H)      DVT (deep venous thrombosis) (H)      DVT of axillary vein, acute right (H)      Fibromyalgia      Gastro-oesophageal reflux disease      GERD (gastroesophageal reflux disease)      Hernia of unspecified site of abdominal cavity without mention of obstruction or gangrene     bilateral     History of blood transfusion     10/ 1980     HTN (hypertension)      HTN (hypertension)      Hypertriglyceridemia      Hypertriglyceridemia      Hypokalemia       Hypothyroidism      Obstructive sleep apnea      ANNETTE (obstructive sleep apnea)      ANNETTE on CPAP      Osteoarthritis of glenohumeral joint      Papillary carcinoma of thyroid (H)     s/p thyroidectomy - [facility]uegemer     Papillary carcinoma of thyroid (H)      PE (pulmonary embolism)      Poliomyelitis     poor circulation right leg     Poliomyelitis      Postsurgical hypothyroidism     s/p papillary thryoid cancer - Ruegemer     Pulmonary embolism (H)      Pulmonary embolus (H)      Pulmonary nodule      Rosacea      S/P carpal tunnel release     bilateral     S/P hardware removal 01/2014    still with lingering foot pain     S/P shoulder surgery     bilateral     Septic joint (H)     right knee     Septic joint of right knee joint (H)      Venous insufficiency      Venous insufficiency      Venous thrombosis 1999    right axillary vein     Current Outpatient Medications   Medication Sig Dispense Refill     amLODIPine (NORVASC) 5 MG tablet Take 1 tablet (5 mg) by mouth 2 times daily (Patient taking differently: Take 2.5 mg by mouth 2 times daily Takes 2.5mg 3-4 times a day.  Takes 2.5mg in am and afternoon then the 5mg tablet at night.) 180 tablet 3     aspirin (SB LOW DOSE ASA EC) 81 MG EC tablet Take 81 mg by mouth daily       atenolol (TENORMIN) 50 MG tablet Take 1 tablet (50 mg) by mouth 2 times daily 180 tablet 3     azelastine (ASTEPRO) 0.15 % nasal spray 1 spray       Cholecalciferol (VITAMIN D-3 PO) Take 2 tablets by mouth       clotrimazole (LOTRIMIN) 1 % cream Apply topically daily       Continuous Blood Gluc Sensor (FREESTYLE BRANDY 14 DAY SENSOR) [facility] APPLY 1 SENSOR AND CHANGE EVERY 14 DAYS AS DIRECTED 2 each 2     diphenhydrAMINE-acetaminophen (TYLENOL PM)  MG tablet Take 1 tablet by mouth At Bedtime Reported on 3/20/2017       ezetimibe (ZETIA) 10 MG tablet Take 1 tablet (10 mg) by mouth daily 90 tablet 3     famotidine (PEPCID) 20 MG tablet Take 2 tablets (40 mg) by mouth as needed 180 tablet 3      fenofibrate (TRICOR) 145 MG tablet Take 1 tablet (145 mg) by mouth daily 90 tablet 3     ferrous sulfate (IRON) 325 (65 FE) MG tablet Take 325 mg by mouth daily (with breakfast)       fexofenadine (ALLEGRA) 180 MG tablet Take 180 mg by mouth daily. 120 0     fluticasone (FLONASE) 50 MCG/ACT spray Spray 2 sprays in nostril daily 2 sprays in each nostril qd 1 Bottle 0     gabapentin (NEURONTIN) 100 MG capsule Take 1 capsule (100 mg) by mouth every evening as needed 90 capsule 3     levothyroxine (SYNTHROID) 112 MCG tablet Take 112 mcg by mouth daily Take 112 mcg daily except for Friday takes only 56 mcg.  Brand name Synthroid       lisinopril (ZESTRIL) 40 MG tablet TAKE ONE TABLET BY MOUTH ONCE DAILY 90 tablet 3     MULTIVITAMINS OR TABS ONE DAILY 100 3     nitroGLYcerin (NITROSTAT) 0.4 MG sublingual tablet Place 1 tablet (0.4 mg) under the tongue every 5 minutes as needed for chest pain (no more than 3 in one hour; after 3rd, call 911.) 25 tablet 3     nystatin (MYCOSTATIN) cream Apply topically daily as needed        order for DME Equipment being ordered: Compression stockings - Knee High; 20-30 mmHg compression - note would like adhesive band to keep the stocking from sliding down 3 each 0     pantoprazole (PROTONIX) 40 MG EC tablet Take 40 mg by mouth 2 times daily       sitagliptin (JANUVIA) 50 MG tablet Januvia 50 mg tablet       TRESIBA FLEXTOUCH 100 UNIT/ML pen Inject 5 Units Subcutaneous daily       tretinoin (RETIN-A) 0.025 % external cream Use every night as tolerated - spot treat lesion 20 g 3     albuterol (PROAIR HFA/PROVENTIL HFA/VENTOLIN HFA) 108 (90 Base) MCG/ACT inhaler Inhale 2-4 puffs into the lungs every 2 hours as needed for shortness of breath / dyspnea (Patient not taking: Reported on 8/21/2021) 1 Inhaler 1     fluocinolone acetonide (DERMA SMOOTHE/FS BODY) 0.01 % external oil Apply 2 mL to scalp once per week. Massage into scalp. Can be left in overnight or washed out after 4-6 hours.  (Patient not taking: Reported on 8/21/2021) 118.28 mL 5     furosemide (LASIX) 20 MG tablet TAKE ONE TABLET (20MG) BY MOUTH ONCE DAILY; TAKE AN ADDITIONAL TABLET (20MG) IN THE AFTERNOON ON MONDAY, WEDNESDAY AND FRIDAY (Patient not taking: Reported on 8/21/2021) 135 tablet 0     metoclopramide (REGLAN) 5 MG tablet Take 1-2 tablets (5-10 mg) by mouth 2 times daily as needed a (Patient not taking: Reported on 8/21/2021) 120 tablet 0     nystatin-triamcinolone (MYCOLOG II) cream Apply topically daily as needed (Patient not taking: Reported on 8/21/2021)       ondansetron (ZOFRAN) 4 MG tablet Take 1 tablet by mouth every 6 hours as needed Reported on 3/20/2017 (Patient not taking: Reported on 8/21/2021)       order for DME Equipment being ordered: Oral appliance for sleep apnea (Patient not taking: Reported on 8/16/2021) 1 Units 0     triamterene-HCTZ (DYAZIDE) 37.5-25 MG capsule Take 1 capsule by mouth daily 90 capsule 3     Social History     Tobacco Use     Smoking status: Never Smoker     Smokeless tobacco: Never Used   Substance Use Topics     Alcohol use: Not Currently     Alcohol/week: 0.0 standard drinks       ROS:  Review of systems negative except as stated above.    EXAM:   BP (!) 140/73   Pulse 56   Temp 97.6  F (36.4  C)   Wt 89 kg (196 lb 3.2 oz)   SpO2 99%   BMI 38.32 kg/m    Gen: healthy,alert,no distress  Extremity: foot has no swelling noted.  Foot deformity and partial amputation of 5th toe ivory baseline per patient. Mild tenderness underside of great toe but mild pain.    There is not compromise to the distal circulation.  Pulses are +2 and CRT is brisk  GENERAL APPEARANCE: healthy, alert and no distress  EXTREMITIES: peripheral pulses normal  SKIN: no suspicious lesions or rashes  NEURO: Normal strength and tone, sensory exam grossly normal, mentation intact and speech normal    X-RAY was done. No acute findings     assessment/plan:  (S99.929W) Foot injury, right, initial encounter  (primary  encounter diagnosis)  Comment:   Plan: XR Foot Right G/E 3 Views          No fracture noted and continue with OTC med for sx relief and activity as tolerated  To Follow-up with PCP as needed if sx worsen or new sx develop

## 2021-08-26 ENCOUNTER — OFFICE VISIT (OUTPATIENT)
Dept: CARDIOLOGY | Facility: CLINIC | Age: 76
End: 2021-08-26
Payer: MEDICARE

## 2021-08-26 VITALS
HEART RATE: 59 BPM | DIASTOLIC BLOOD PRESSURE: 74 MMHG | WEIGHT: 197.4 LBS | HEIGHT: 60 IN | SYSTOLIC BLOOD PRESSURE: 177 MMHG | BODY MASS INDEX: 38.76 KG/M2

## 2021-08-26 DIAGNOSIS — I10 BENIGN ESSENTIAL HYPERTENSION: ICD-10-CM

## 2021-08-26 PROCEDURE — 99214 OFFICE O/P EST MOD 30 MIN: CPT | Performed by: INTERNAL MEDICINE

## 2021-08-26 RX ORDER — TRIAMTERENE AND HYDROCHLOROTHIAZIDE 37.5; 25 MG/1; MG/1
1 CAPSULE ORAL DAILY
Qty: 90 CAPSULE | Refills: 3 | Status: SHIPPED | OUTPATIENT
Start: 2021-08-26 | End: 2021-09-27

## 2021-08-26 ASSESSMENT — MIFFLIN-ST. JEOR: SCORE: 1316.9

## 2021-08-26 NOTE — PROGRESS NOTES
Cardiology Clinic          Assessment & Plan         1.  Nonocclusive coronary disease by angiogram 2018  2.  Chronic kidney disease  3.  Hypertension  4.  PAD  5.  Diabetes mellitus  6.  Hyperlipidemia - Statin intolerance   7.  Hypothyroidism  8.  History of DVT  9.  ANNETTE  10.  History of polio age to right foot brace due to deformity  11.  History of colorectal surgery currently has a colostomy, is in need of a minor procedure, outpatient  12.  Left hip surgery due to avascular necrosis at [facility].  Patient did well with no cardiovascular sequelae     Recommendations     1.  Atherosclerotic coronary artery disease : nonocclusive coronary artery disease by cardiac cath.  Stable symptoms.  We will continue with secondary prevention    2.  Hyperlipidemia: Has not been able to tolerate statins due to multiple allergies.  Continue with her current regimen.      3.  We will add Dyazide to her current regimen of atenolol, lisinopril and Norvasc for additional blood pressure control.  Lab work we ordered in 2 weeks time.  She lives closer to [place] and this can be performed there.    4.  Updated assessment of her LV systolic function will be made by echocardiogram.  We will order this for [place].    5.  Return to clinic in 1 years time.    Ankit Bhatia MD         HPI:     Patient is a very pleasant 75-year-old female who has been followed by my colleague Dr. Ortiz for who has recently retired.  I last saw patient in 2020.   Her cardiac history is notable for nonocclusive coronary artery disease by coronary angiogram in 2018.  This was performed for pre-operative clearance for right shoulder surgery.  She underwent her surgery without difficulty.  Her other past medical history is as noted above.  She has long-standing history of hypertension and is on a multidrug regimen.  She also has chronic kidney disease.  She is in need of a minor colorectal procedure per patient.  This is an outpatient procedure  that require some revision of her previous colostomy however through noninvasive measures.  Cardiac wise she is at baseline with no active symptoms.    Since my last visit she has mainly orthopedic issues.  She had avascular necrosis for which she underwent left hip surgery in May 2020.  She had a uneventful hospital course and tells me that there was no mention of any cardiovascular sequelae during her procedure.  Her  unfortunately has had difficulty with his Parkinson's and has now moved into a nursing home in March 2020.  As a result [facility]  She was worried about maintaining a house by herself.  Otherwise is taking all of her medications and is here for her yearly visit.         Exam:      GENERAL: Healthy, alert and no distress  EYES: Eyes grossly normal to inspection.  No discharge or erythema, or obvious scleral/conjunctival abnormalities.  RESP: No audible wheeze, cough, or visible cyanosis.  No visible retractions or increased work of breathing.    SKIN: Visible skin clear. No significant rash, abnormal pigmentation or lesions.  NEURO: Cranial nerves grossly intact.  Mentation and speech appropriate for age.  PSYCH: Mentation appears normal, affect normal/bright, judgement and insight intact, normal speech and appearance well-groomed.

## 2021-08-26 NOTE — LETTER
8/26/2021    Shola Benoit MD, MD  [address]  [facility] [address]    RE: Chasity Hodgson       Dear Colleague,    I had the pleasure of seeing Chasity Hodgson in the [facility].        Cardiology Clinic          Assessment & Plan         1.  Nonocclusive coronary disease by angiogram 2018  2.  Chronic kidney disease  3.  Hypertension  4.  PAD  5.  Diabetes mellitus  6.  Hyperlipidemia - Statin intolerance   7.  Hypothyroidism  8.  History of DVT  9.  ANNETTE  10.  History of polio age to right foot brace due to deformity  11.  History of colorectal surgery currently has a colostomy, is in need of a minor procedure, outpatient  12.  Left hip surgery due to avascular necrosis at [facility].  Patient did well with no cardiovascular sequelae     Recommendations     1.  Atherosclerotic coronary artery disease : nonocclusive coronary artery disease by cardiac cath.  Stable symptoms.  We will continue with secondary prevention    2.  Hyperlipidemia: Has not been able to tolerate statins due to multiple allergies.  Continue with her current regimen.      3.  We will add Dyazide to her current regimen of atenolol, lisinopril and Norvasc for additional blood pressure control.  Lab work we ordered in 2 weeks time.  She lives closer to [place] and this can be performed there.    4.  Updated assessment of her LV systolic function will be made by echocardiogram.  We will order this for [place].    5.  Return to clinic in 1 years time.    Ankit Bhatia MD         HPI:     Patient is a very pleasant 75-year-old female who has been followed by my colleague Dr. Ortiz for who has recently retired.  I last saw patient in 2020.   Her cardiac history is notable for nonocclusive coronary artery disease by coronary angiogram in 2018.  This was performed for pre-operative clearance for right shoulder surgery.  She underwent her surgery without difficulty.  Her  other past medical history is as noted above.  She has long-standing history of hypertension and is on a multidrug regimen.  She also has chronic kidney disease.  She is in need of a minor colorectal procedure per patient.  This is an outpatient procedure that require some revision of her previous colostomy however through noninvasive measures.  Cardiac wise she is at baseline with no active symptoms.    Since my last visit she has mainly orthopedic issues.  She had avascular necrosis for which she underwent left hip surgery in May 2020.  She had a uneventful hospital course and tells me that there was no mention of any cardiovascular sequelae during her procedure.  Her  unfortunately has had difficulty with his Parkinson's and has now moved into a nursing home in March 2020.  As a result she has downsized and is now in a townhome to her relief.  She was worried about maintaining a house by herself.  Otherwise is taking all of her medications and is here for her yearly visit.         Exam:      GENERAL: Healthy, alert and no distress  EYES: Eyes grossly normal to inspection.  No discharge or erythema, or obvious scleral/conjunctival abnormalities.  RESP: No audible wheeze, cough, or visible cyanosis.  No visible retractions or increased work of breathing.    SKIN: Visible skin clear. No significant rash, abnormal pigmentation or lesions.  NEURO: Cranial nerves grossly intact.  Mentation and speech appropriate for age.  PSYCH: Mentation appears normal, affect normal/bright, judgement and insight intact, normal speech and appearance well-groomed.            Thank you for allowing me to participate in the care of your patient.      Sincerely,     Ankit Bhatia MD     [facility]  cc:   Ankit Bhatia MD  [address] [facility] [address] [place] [id number]

## 2021-09-01 ENCOUNTER — TELEPHONE (OUTPATIENT)
Dept: CARDIOLOGY | Facility: CLINIC | Age: 76
End: 2021-09-01

## 2021-09-01 NOTE — TELEPHONE ENCOUNTER
Received a call from Sammie and she has concerns with starting the Dyazide that Dr. Bhatia prescribed. She became tearful after reading all the side effects and states she lives alone and is afraid she will pass out like the insert says. I told her if she is that concerned then don't take the medicine. She has an appointment with her primary next week and I told her she could discuss it with them. She agreed and will follow up with Dr. Benoit.Alex Bay RN

## 2021-09-07 ENCOUNTER — OFFICE VISIT (OUTPATIENT)
Dept: FAMILY MEDICINE | Facility: CLINIC | Age: 76
End: 2021-09-07
Payer: MEDICARE

## 2021-09-07 VITALS
TEMPERATURE: 96.9 F | SYSTOLIC BLOOD PRESSURE: 150 MMHG | BODY MASS INDEX: 38.88 KG/M2 | OXYGEN SATURATION: 100 % | RESPIRATION RATE: 20 BRPM | DIASTOLIC BLOOD PRESSURE: 76 MMHG | WEIGHT: 199.1 LBS | HEART RATE: 63 BPM

## 2021-09-07 DIAGNOSIS — Z23 NEED FOR PROPHYLACTIC VACCINATION AND INOCULATION AGAINST INFLUENZA: ICD-10-CM

## 2021-09-07 DIAGNOSIS — I10 BENIGN ESSENTIAL HYPERTENSION: Primary | ICD-10-CM

## 2021-09-07 PROCEDURE — 99213 OFFICE O/P EST LOW 20 MIN: CPT | Mod: 25 | Performed by: INTERNAL MEDICINE

## 2021-09-07 PROCEDURE — 90662 IIV NO PRSV INCREASED AG IM: CPT | Performed by: INTERNAL MEDICINE

## 2021-09-07 PROCEDURE — G0008 ADMIN INFLUENZA VIRUS VAC: HCPCS | Performed by: INTERNAL MEDICINE

## 2021-09-07 NOTE — PATIENT INSTRUCTIONS
(I10) Benign essential hypertension  (primary encounter diagnosis)  Comment: blood pressure is improved, but need for increased blood pressure medications.  I would recommend starting the triamterene/ hydrochlorothiazide and follow up in 2 weeks for labs in [place].  Also, plan for echocardiogram.  Also, plan to follow up in nephrology.   Plan:

## 2021-09-07 NOTE — PROGRESS NOTES
[facility]  CLINIC PROGRESS NOTE    Subjective:  Hypertension   Recent increase in amlodipine to 5 mg twice per day and continues on atenolol, lisinopril, and was recommended to consider a trial of triamterene/ hydrochlorothiazide by cardiology, but has been hesitant due to possible side efffects of lower blood pressure.  She has been checking her blood pressure at home and readings have been 120's-170's with average around 150 systolic.  A follow up echocardiogram was requested and scheduled for two months.       Notably she has been having some difficulty with feeling sleepy in the AM with higher dose of 5 mg and cutting her AM does in half to 2.5 mg daily.   Also, not taking lasix at this time.      Past medical history, medications, allergies, social history, family history reviewed and updated in EPIC as of 9/7/2021 .    ROS  CONSTITUTIONAL: no fatigue, no unexpected change in weight  SKIN: following in dermatology   EYES: no acute vision problems or changes  ENT: no ear problems, no mouth problems, no throat problems  RESP: no significant cough, no shortness of breath  CV: as above   GI: recent surgery for rectal discharge - ostomy working well  : no frequency, no dysuria, no hematuria  MS: no claudication, no myalgias, no joint aches  PSYCHIATRIC: no changes in mood or affect      Objective:  Vitals  BP (!) 150/76   Pulse 63   Temp 96.9  F (36.1  C) (Temporal)   Resp 20   Wt 90.3 kg (199 lb 1.6 oz)   SpO2 100%   Breastfeeding No   BMI 38.88 kg/m    GEN: Alert Oriented x3 NAD  HEENT: Atraumatic, normocephalic, neck supple,   CV: RRR no murmurs or rubs  PULM: CTA no wheezes or crackles  ABD: Soft, nontender nondistended,   SKIN: No visible skin lesion or ulcerations  EXT: Compression stocking right ankle, trace edema bilateral lower extremities  NEURO: Gait and station deferred, No focal neurologic deficits  PSYCH: Mood good, affect mood congruent    No images are attached to the  encounter.    No results found. However, due to the size of the patient record, not all encounters were searched. Please check Results Review for a complete set of results.    Assessment/Plan:  Patient Instructions   (I10) Benign essential hypertension  (primary encounter diagnosis)  Comment: blood pressure is improved, but need for increased blood pressure medications.  I would recommend starting the triamterene/ hydrochlorothiazide and follow up in 2 weeks for labs in [place].  Also, plan for echocardiogram.  Also, plan to follow up in nephrology.   Plan:        Follow up in 3 months    Disclaimer: This note consists of symbols derived from keyboarding, dictation and/or voice recognition software. As a result, there may be errors in the script that have gone undetected. Please consider this when interpreting information found in this chart.    Shola Benoit MD  [phone]

## 2021-09-08 ENCOUNTER — TRANSFERRED RECORDS (OUTPATIENT)
Dept: HEALTH INFORMATION MANAGEMENT | Facility: CLINIC | Age: 76
End: 2021-09-08

## 2021-09-22 ENCOUNTER — TELEPHONE (OUTPATIENT)
Dept: CARDIOLOGY | Facility: CLINIC | Age: 76
End: 2021-09-22

## 2021-09-22 RX ORDER — AMLODIPINE BESYLATE 2.5 MG/1
5 TABLET ORAL 2 TIMES DAILY
COMMUNITY
End: 2021-12-07

## 2021-09-22 RX ORDER — FUROSEMIDE 20 MG
20 TABLET ORAL DAILY
COMMUNITY
End: 2021-10-08

## 2021-09-22 NOTE — TELEPHONE ENCOUNTER
Patient called to report changes in blood pressure medication. Dr. Bhatia prescribed Dyazide in visit on 8/26/21. She was reluctant to start medication, so she waited to discuss with her PCP. Her PCP recommended she start medication for HTN, so she started after visit on 9/7/21. She saw nephrologist Dr. Son on 9/15/21 who told her to stop Dyazide, that the dose was likely too high, would prefer 1/2 tab. Patient instructed to take Lasix daily. Per patient, she can only tolerate 20mg daily otherwise she gets bladder spasms.  She wants to cook with more basil, she read it can lower blood pressure. She does check her BP daily today was 157/80's. Sometimes in the evening 130s/60s.   She will get BMP as scheduled 9/24/21, told her I would review this information and lab results with Dr. Bhatia.    Floresita Hernandez RN  [facility]

## 2021-09-24 ENCOUNTER — LAB (OUTPATIENT)
Dept: LAB | Facility: CLINIC | Age: 76
End: 2021-09-24
Payer: MEDICARE

## 2021-09-24 DIAGNOSIS — I10 BENIGN ESSENTIAL HYPERTENSION: ICD-10-CM

## 2021-09-24 LAB
ANION GAP SERPL CALCULATED.3IONS-SCNC: 6 MMOL/L (ref 3–14)
BUN SERPL-MCNC: 35 MG/DL (ref 7–30)
CALCIUM SERPL-MCNC: 8.7 MG/DL (ref 8.5–10.1)
CHLORIDE BLD-SCNC: 106 MMOL/L (ref 94–109)
CO2 SERPL-SCNC: 26 MMOL/L (ref 20–32)
CREAT SERPL-MCNC: 1.24 MG/DL (ref 0.52–1.04)
GFR SERPL CREATININE-BSD FRML MDRD: 43 ML/MIN/1.73M2
GLUCOSE BLD-MCNC: 201 MG/DL (ref 70–99)
POTASSIUM BLD-SCNC: 4.8 MMOL/L (ref 3.4–5.3)
SODIUM SERPL-SCNC: 138 MMOL/L (ref 133–144)

## 2021-09-24 PROCEDURE — 80048 BASIC METABOLIC PNL TOTAL CA: CPT

## 2021-09-24 PROCEDURE — 36415 COLL VENOUS BLD VENIPUNCTURE: CPT

## 2021-09-27 ENCOUNTER — OFFICE VISIT (OUTPATIENT)
Dept: FAMILY MEDICINE | Facility: CLINIC | Age: 76
End: 2021-09-27
Payer: MEDICARE

## 2021-09-27 VITALS
SYSTOLIC BLOOD PRESSURE: 148 MMHG | OXYGEN SATURATION: 99 % | HEART RATE: 58 BPM | TEMPERATURE: 96.9 F | WEIGHT: 198.8 LBS | DIASTOLIC BLOOD PRESSURE: 57 MMHG | RESPIRATION RATE: 20 BRPM | BODY MASS INDEX: 38.83 KG/M2

## 2021-09-27 DIAGNOSIS — Z93.3 COLOSTOMY IN PLACE (H): ICD-10-CM

## 2021-09-27 DIAGNOSIS — Z01.818 PREOP GENERAL PHYSICAL EXAM: Primary | ICD-10-CM

## 2021-09-27 DIAGNOSIS — Z86.718 HISTORY OF DVT (DEEP VEIN THROMBOSIS): ICD-10-CM

## 2021-09-27 DIAGNOSIS — I10 BENIGN ESSENTIAL HYPERTENSION: ICD-10-CM

## 2021-09-27 DIAGNOSIS — E11.9 TYPE 2 DIABETES, HBA1C GOAL < 7% (H): ICD-10-CM

## 2021-09-27 DIAGNOSIS — N18.30 STAGE 3 CHRONIC KIDNEY DISEASE, UNSPECIFIED WHETHER STAGE 3A OR 3B CKD (H): ICD-10-CM

## 2021-09-27 LAB
ERYTHROCYTE [DISTWIDTH] IN BLOOD BY AUTOMATED COUNT: 12.3 % (ref 10–15)
HCT VFR BLD AUTO: 34.2 % (ref 35–47)
HGB BLD-MCNC: 11.6 G/DL (ref 11.7–15.7)
MCH RBC QN AUTO: 30.9 PG (ref 26.5–33)
MCHC RBC AUTO-ENTMCNC: 33.9 G/DL (ref 31.5–36.5)
MCV RBC AUTO: 91 FL (ref 78–100)
PLATELET # BLD AUTO: 199 10E3/UL (ref 150–450)
RBC # BLD AUTO: 3.76 10E6/UL (ref 3.8–5.2)
WBC # BLD AUTO: 7.4 10E3/UL (ref 4–11)

## 2021-09-27 PROCEDURE — 36415 COLL VENOUS BLD VENIPUNCTURE: CPT | Performed by: PHYSICIAN ASSISTANT

## 2021-09-27 PROCEDURE — 80048 BASIC METABOLIC PNL TOTAL CA: CPT | Performed by: PHYSICIAN ASSISTANT

## 2021-09-27 PROCEDURE — 99215 OFFICE O/P EST HI 40 MIN: CPT | Performed by: PHYSICIAN ASSISTANT

## 2021-09-27 PROCEDURE — 85027 COMPLETE CBC AUTOMATED: CPT | Performed by: PHYSICIAN ASSISTANT

## 2021-09-27 ASSESSMENT — PATIENT HEALTH QUESTIONNAIRE - PHQ9: SUM OF ALL RESPONSES TO PHQ QUESTIONS 1-9: 3

## 2021-09-27 NOTE — PATIENT INSTRUCTIONS
Decrease Tresiba to 3 units in the AM the morning of surgery    Don't take Sitagliptin (Januvia) the AM of surgery, you can take when you eat after the surgery     Don't take furosemide (lasix) the AM of surgery    Take synthroid, lisinopril, atenolol, amlodipine and protonix with a sip of water the AM of surgery.    Stop all NSAIDs (motrin, ibuprofen, naproxen, aleve, advil, naprosyn, aspirin)  for at least 5 days prior to surgery.    Skip supplements the AM of surgery

## 2021-09-27 NOTE — PROGRESS NOTES
[address], [address]  [facility] [address]  Phone: [phone]  Primary Provider: Shola Benoit  Pre-op Performing Provider: JEANETTE MELTON      PREOPERATIVE EVALUATION:  Today's date: 9/27/2021    Chasity Hodgson is a 75 year old female who presents for a preoperative evaluation.    Surgical Information:  Surgery/Procedure: EXAM UNDER ANESTHESIA, REMOVAL OF ARTIFICIAL BOWEL SPHINCTER PUMP AND RESEVIOR  Surgery Location: [facility]   Surgeon: Leonides Lawler MD  Surgery Date: 10/7/2021  Time of Surgery: 11:00 am   Where patient plans to recover: At home with family  Fax number for surgical facility: [phone]    Type of Anesthesia Anticipated: to be determined    Assessment & Plan     The proposed surgical procedure is considered INTERMEDIATE risk.    Assessment and Plan:     (Z01.818) Preop general physical exam  (primary encounter diagnosis)  Comment: able to do 4 METS, no cardiac history  Plan: CBC with platelets, Basic metabolic panel  (Ca,        Cl, CO2, Creat, Gluc, K, Na, BUN)   approved if labs WNL    (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)  Comment: history of incontinence, had artificial sphincter placed years ago  Plan: per surgeon    (E11.9) Type 2 diabetes, HbA1c goal < 7% (H)  Comment: on januvia and Tresiba  Plan: cont above, see med plan below for AM of surgery    (I10) Benign essential hypertension  Comment: on lasix, lisinopril, amlodipine and atenolol   Plan: cont above, hold lasix am of surgery    (N18.30) Stage 3 chronic kidney disease, unspecified whether stage 3a or 3b CKD (H)  Comment: creatinine recently bumped up to 1.24, follows with nephrology  Plan: BMP today    (Z86.718) History of DVT (deep vein thrombosis)  Comment: provoked w/PICC  Plan: no on any blood thinners    Risks and Recommendations:  The patient has the following additional risks and recommendations for perioperative complications:   -  Consult Hospitalist / IM to assist with post-op medical management    Medication Instructions:  Decrease Tresiba to 3 units in the AM the morning of surgery    Don't take Sitagliptin (Januvia) the AM of surgery, you can take when you eat after the surgery     Don't take furosemide (lasix) the AM of surgery    Take synthroid, lisinopril, atenolol, amlodipine and protonix with a sip of water the AM of surgery.    Stop all NSAIDs (motrin, ibuprofen, naproxen, aleve, advil, naprosyn, aspirin)  for at least 5 days prior to surgery.    Skip supplements the AM of surgery      RECOMMENDATION:  APPROVAL GIVEN to proceed with proposed procedure pending review of labs.    Maude Ortiz PA-C    45  minutes on the day of the encounter doing chart review, history and exam, documentation and further activities as noted above.'    Subjective     HPI related to upcoming procedure:     Has a colostomy--recently has been having urge to have BM, surgeon suspects due to artificial sphincter which was placed prior to colostomy, upcoming procedure to have it removed.    She feels well otherwise    She does have a lot of chronic fatigue which she suspects is due to poor sleep    She is able to go up a flight of stairs without chest pain or significant dyspnea     She does PT every week    She has a history of DVT RUE 2/2 PICC, only one, not on blood thinners.    Recently taken off dyazide and started on lasix 20mg daily    She denies fever/chill, congestion, cough, chest pain, sob, abdominal pain, nausea/vomiting, bloody/black stool, dysuria/frequency    Preop Questions 9/23/2021   1. Have you ever had a heart attack or stroke? No   2. Have you ever had surgery on your heart or blood vessels, such as a stent placement, a coronary artery bypass, or surgery on an artery in your head, neck, heart, or legs? No   3. Do you have chest pain with activity? No   4. Do you have a history of  heart failure? No   5. Do you currently have a  cold, bronchitis or symptoms of other infection? No   6. Do you have a cough, shortness of breath, or wheezing? No   7. Do you or anyone in your family have previous history of blood clots? YES - PICC associated, 1999   8. Do you or does anyone in your family have a serious bleeding problem such as prolonged bleeding following surgeries or cuts? No   9. Have you ever had problems with anemia or been told to take iron pills? YES -    10. Have you had any abnormal blood loss such as black, tarry or bloody stools, or abnormal vaginal bleeding? No   11. Have you ever had a blood transfusion? YES -    11a. Have you ever had a transfusion reaction? No   12. Are you willing to have a blood transfusion if it is medically needed before, during, or after your surgery? Yes   13. Have you or any of your relatives ever had problems with anesthesia? No   14. Do you have sleep apnea, excessive snoring or daytime drowsiness? UNKNOWN -   14a. Do you have a CPAP machine? -   15. Do you have any artifical heart valves or other implanted medical devices like a pacemaker, defibrillator, or continuous glucose monitor? No   16. Do you have artificial joints? YES -    17. Are you allergic to latex? No       Health Care Directive:  Patient has a Health Care Directive on file      Review of Systems  Constitutional, neuro, ENT, endocrine, pulmonary, cardiac, gastrointestinal, genitourinary, musculoskeletal, integument and psychiatric systems are negative, except as otherwise noted.    Patient Active Problem List    Diagnosis Date Noted     Renal artery stenosis (H) 07/31/2020     Priority: Medium     CKD (chronic kidney disease) stage 3, GFR 30-59 ml/min 04/17/2019     Priority: Medium     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     Priority: Medium     Morbid obesity (H) 08/01/2018     Priority: Medium     Normocytic anemia 01/16/2017     Priority: Medium     Allergic dermatitis due to other chemical product 10/02/2016     Priority: Medium      Insufficiency, arterial, peripheral (H) 11/08/2015     Priority: Medium     Mild seen on CARMITA 11/2015 - right sided       Type 2 diabetes mellitus with other specified complication (H) 10/18/2015     Priority: Medium     Carotid stenosis 12/09/2014     Priority: Medium     Mild increased stenosis 50-69% left ICA       Right shoulder pain 12/09/2014     Priority: Medium     Left knee pain 11/03/2014     Priority: Medium     Poliomyelitis      Priority: Medium     poor circulation right leg       Diabetic gastroparesis (H)      Priority: Medium     Dermatitis 02/15/2014     Priority: Medium     Acne rosacea 02/15/2014     Priority: Medium     Esophageal reflux 01/23/2014     Priority: Medium     Had upper endoscopy - Dr. Pantoja 2013       Pulmonary nodule 09/06/2013     Priority: Medium     Alopecia 02/09/2013     Priority: Medium     Problem list name updated by automated process. Provider to review       Papillary carcinoma of thyroid (H)      Priority: Medium     s/p thyroidectomy - Ruegemer       Gastroparesis 11/12/2012     Priority: Medium     Postsurgical hypothyroidism      Priority: Medium     [facility]  Concern for possible recurrence 03/2014       Type 2 diabetes, HbA1c goal < 7% (H)      Priority: Medium     ACP (advance care planning) 09/21/2012     Priority: Medium     Discussed advance care planning with patient; information given to patient to review. 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  Provider to review       ANNETTE (obstructive sleep apnea)      Priority: Medium     Mixed hyperlipidemia 10/31/2010     Priority: Medium     Low back pain 07/15/2009     Priority: Medium      Past Medical History:   Diagnosis Date     Abdominal adhesions 1984, 96,99    s/p lysis     Abdominal adhesions      Acne rosacea      Allergic rhinitis      Allergic rhinitis, cause unspecified      Alopecia      Anemia      CAD (coronary artery disease)      Carotid stenosis      CKD (chronic kidney disease) stage 2, GFR 60-89 ml/min      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)      CPAP (continuous positive airway pressure) dependence      Depression      Diabetic gastroparesis (H)      Diet-controlled type 2 diabetes mellitus (H)      DM2 (diabetes mellitus, type 2) (H)      DVT (deep venous thrombosis) (H)      DVT of axillary vein, acute right (H)      Fibromyalgia      Gastro-oesophageal reflux disease      GERD (gastroesophageal reflux disease)      Hernia of unspecified site of abdominal cavity without mention of obstruction or gangrene     bilateral     History of blood transfusion     10/ 1980     HTN (hypertension)      HTN (hypertension)      Hypertriglyceridemia      Hypertriglyceridemia      Hypokalemia      Hypothyroidism      Obstructive sleep apnea      ANNETTE (obstructive sleep apnea)      ANNETTE on CPAP      Osteoarthritis of glenohumeral joint      Papillary carcinoma of thyroid (H)     s/p thyroidectomy - Ruegemer     Papillary carcinoma of thyroid (H)      PE (pulmonary embolism)      Poliomyelitis     poor circulation right leg     Poliomyelitis      Postsurgical hypothyroidism     s/p papillary thryoid cancer - Ruegemer     Pulmonary embolism (H)      Pulmonary embolus (H)      Pulmonary nodule      Rosacea      S/P carpal tunnel release     bilateral     S/P hardware removal 01/2014    still with lingering foot pain     S/P shoulder surgery     bilateral     Septic joint (H)     right knee     Septic joint of right knee joint  (H)      Venous insufficiency      Venous insufficiency      Venous thrombosis 1999    right axillary vein     Past Surgical History:   Procedure Laterality Date     AMPUTATE TOE(S)  3/15/2012    Procedure:AMPUTATE TOE(S); Surgeon:RUBEN MOSES; Location: OR     AMPUTATE TOE(S)       APPENDECTOMY  1972     APPENDECTOMY       ARTHRODESIS FOOT  3/15/2012    Procedure:ARTHRODESIS FOOT; RIGHT TRIPLE ARTHRODESIS, FIFTH TOE AMPUTATION, LATERAL LIGAMENT RECONSTRUCTION, TENDON TRANSFER AND RELEASE [MINI C-ARM, ARTHREX 4.5 AND 6.7 STAPLES, BIOCOMPOSITE TENODESIS SCREWS]; Surgeon:RUBEN MOSES; Location: OR     ARTHRODESIS FOOT Right     Right foot triple arthrodesis and removal of hardware     ARTHROSCOPY SHOULDER  06/25/2015    REVISION SUBACROMIAL DECOMPRESSION, EXCISION OF GANGLION CYST, DEBRIDEMENT AND EXCISION OF THE GLENOHUMERAL JOINT GANGLION CYST, CORACOID DECOMPRESSION, POSSIBLE SUBSCAPULARIS REPAIR AND OPEN SUBSCAPULARIS BICEP     ARTHROSCOPY SHOULDER ROTATOR CUFF REPAIR       BIOPSY  Thyroid 2002     BREAST SURGERY  Biopsy     C FREEING BOWEL ADHESION,ENTEROLYSIS      1986, 1996, 1999     C TOTAL ABDOM HYSTERECTOMY  1980    + BSO     CHOLECYSTECTOMY       CHOLECYSTECTOMY       COLONOSCOPY  2018     COLOSTOMY  2/7/2012    Procedure:COLOSTOMY; CREATION OF SIGMOID COLOSTOMY AND EXTENSIVE  LYSIS OF ADHESIONS; Surgeon:MONTSERRAT BENDER P; Location: OR     COLOSTOMY       EYE SURGERY       GENITOURINARY SURGERY  1999     GI SURGERY      weakened rectal sphincter with artificial stimulator     HERNIA REPAIR  1976     HYSTERECTOMY TOTAL ABDOMINAL       LAPAROTOMY, LYSIS ADHESIONS, COMBINED  2/7/2012    Procedure:COMBINED LAPAROTOMY, LYSIS ADHESIONS; Surgeon:MONTSERRAT BENDER P; Location: OR     RELEASE CARPAL TUNNEL       RELEASE TENDON FOOT  3/15/2012    Procedure:RELEASE TENDON FOOT; Surgeon:RUBEN MOSES; Location: OR     REMOVE HARDWARE FOOT  12/13/2012    Procedure: REMOVE  HARDWARE FOOT;  RIGHT FOOT REMOVAL OF HARDWARE;  Surgeon: Sukhdeep Metz MD;  Location: [facility] OR     SHOULDER SURGERY  11/12/2020    LEFT SHOULDER HEMIARHTROPLASTY, BICEP TENODESIS     SOFT TISSUE SURGERY  2018, 2020     TENDON RELEASE      foot     THYROIDECTOMY       ZZC NONSPECIFIC PROCEDURE      throidectomy     Current Outpatient Medications   Medication Sig Dispense Refill     albuterol (PROAIR HFA/PROVENTIL HFA/VENTOLIN HFA) 108 (90 Base) MCG/ACT inhaler Inhale 2-4 puffs into the lungs every 2 hours as needed for shortness of breath / dyspnea 1 Inhaler 1     amLODIPine (NORVASC) 2.5 MG tablet Take 5 mg by mouth 2 times daily Take 2.5mg in AM, 2.5mg at noon, and 5mg at bedtime       aspirin (SB LOW DOSE ASA EC) 81 MG EC tablet Take 81 mg by mouth daily       atenolol (TENORMIN) 50 MG tablet Take 1 tablet (50 mg) by mouth 2 times daily 180 tablet 3     azelastine (ASTEPRO) 0.15 % nasal spray 1 spray       Cholecalciferol (VITAMIN D-3 PO) Take 2 tablets by mouth       clotrimazole (LOTRIMIN) 1 % cream Apply topically daily       Continuous Blood Gluc Sensor (FREESTYLE BRANDY 14 DAY SENSOR) MISC APPLY 1 SENSOR AND CHANGE EVERY 14 DAYS AS DIRECTED 2 each 2     diphenhydrAMINE-acetaminophen (TYLENOL PM)  MG tablet Take 1 tablet by mouth At Bedtime Reported on 3/20/2017       ezetimibe (ZETIA) 10 MG tablet Take 1 tablet (10 mg) by mouth daily 90 tablet 3     famotidine (PEPCID) 20 MG tablet Take 2 tablets (40 mg) by mouth as needed 180 tablet 3     fenofibrate (TRICOR) 145 MG tablet Take 1 tablet (145 mg) by mouth daily 90 tablet 3     ferrous sulfate (IRON) 325 (65 FE) MG tablet Take 325 mg by mouth daily (with breakfast)       fexofenadine (ALLEGRA) 180 MG tablet Take 180 mg by mouth daily. 120 0     fluocinolone acetonide (DERMA SMOOTHE/FS BODY) 0.01 % external oil Apply 2 mL to scalp once per week. Massage into scalp. Can be left in overnight or washed out after 4-6 hours. 118.28 mL 5      fluticasone (FLONASE) 50 MCG/ACT spray Spray 2 sprays in nostril daily 2 sprays in each nostril qd 1 Bottle 0     furosemide (LASIX) 20 MG tablet Take 20 mg by mouth daily       gabapentin (NEURONTIN) 100 MG capsule Take 1 capsule (100 mg) by mouth every evening as needed 90 capsule 3     levothyroxine (SYNTHROID) 112 MCG tablet Take 112 mcg by mouth daily Take 112 mcg daily except for Friday takes only 56 mcg.  Brand name Synthroid       lisinopril (ZESTRIL) 40 MG tablet TAKE ONE TABLET BY MOUTH ONCE DAILY 90 tablet 3     metoclopramide (REGLAN) 5 MG tablet Take 1-2 tablets (5-10 mg) by mouth 2 times daily as needed a 120 tablet 0     MULTIVITAMINS OR TABS ONE DAILY 100 3     nitroGLYcerin (NITROSTAT) 0.4 MG sublingual tablet Place 1 tablet (0.4 mg) under the tongue every 5 minutes as needed for chest pain (no more than 3 in one hour; after 3rd, call 911.) 25 tablet 3     nystatin (MYCOSTATIN) cream Apply topically daily as needed        nystatin-triamcinolone (MYCOLOG II) cream Apply topically daily as needed        ondansetron (ZOFRAN) 4 MG tablet Take 1 tablet by mouth every 6 hours as needed Reported on 3/20/2017       order for DME Equipment being ordered: Compression stockings - Knee High; 20-30 mmHg compression - note would like adhesive band to keep the stocking from sliding down 3 each 0     order for DME Equipment being ordered: Oral appliance for sleep apnea 1 Units 0     pantoprazole (PROTONIX) 40 MG EC tablet Take 40 mg by mouth 2 times daily       sitagliptin (JANUVIA) 50 MG tablet Januvia 50 mg tablet       TRESIBA FLEXTOUCH 100 UNIT/ML pen Inject 5 Units Subcutaneous daily       tretinoin (RETIN-A) 0.025 % external cream Use every night as tolerated - spot treat lesion 20 g 3     triamterene-HCTZ (DYAZIDE) 37.5-25 MG capsule Take 1 capsule by mouth daily 90 capsule 3       Allergies   Allergen Reactions     Nsaids Difficulty breathing     Increased creatinine     Toradol Difficulty breathing      Shortness of breath     Celecoxib Itching and Rash     Codeine Itching     With higher doses     Crestor [Rosuvastatin] Muscle Pain (Myalgia)     No Clinical Screening - See Comments Itching     Fragrance     Vioxx Other (See Comments)     Heart races     Conjugated Estrogens Rash     Sulfa Drugs Rash        Social History     Tobacco Use     Smoking status: Never Smoker     Smokeless tobacco: Never Used   Substance Use Topics     Alcohol use: Not Currently     Alcohol/week: 0.0 standard drinks     Family History   Problem Relation Age of Onset     Arthritis Mother      Hypertension Mother      Cerebrovascular Disease Mother      Obesity Mother      Heart Disease Mother         MI's     Hypertension Father      Respiratory Father         Adult RDS     Diabetes Father         adult     Arthritis Sister      Cancer Sister      Diabetes Sister      Hypertension Sister      Breast Cancer Sister      Depression Sister      Thyroid Disease Sister      Obesity Sister      Arthritis Sister      Hypertension Sister      Thyroid Disease Sister      Cancer Sister         colon polup     Heart Disease Brother         MI at 54     Other Cancer Brother      Hypertension Sister      Osteoporosis Sister      Obesity Sister      Lipids Brother      Hypertension Brother      Diabetes Brother      Hyperlipidemia Brother      Lipids Sister      Obesity Sister      Obesity Maternal Grandmother      Skin Cancer Maternal Grandmother         skin cancer unknown     Cancer Maternal Grandmother         unknown skin cancer on face     Obesity Paternal Grandmother      History   Drug Use No         Objective     BP (!) 148/57 (BP Location: Right arm, Patient Position: Sitting, Cuff Size: Adult Regular)   Pulse 58   Temp 96.9  F (36.1  C) (Temporal)   Resp 20   Wt 90.2 kg (198 lb 12.8 oz)   SpO2 99%   Breastfeeding No   BMI 38.83 kg/m      Physical Exam    GENERAL APPEARANCE: healthy, alert and no distress     EYES: no scleral icterus      HENT: OP clear mouth without ulcers or lesions     NECK: supple, no bruits     RESP: lungs clear to auscultation - no rales, rhonchi or wheezes     CV: regular rates and rhythm, normal S1 S2, no S3 or S4 and no murmur, click or rub     ABDOMEN:  soft, nontender, no HSM or masses and bowel sounds normal     MS: extremities normal- no gross deformities noted, chronic edema bilat lower extremities, RLE braced     NEURO: Normal mentation, gait and speech normal     PSYCH: mentation appears normal. and affect normal/bright    Recent Labs   Lab Test 09/24/21  1112 08/02/21  1511 08/02/21  1510 07/07/21  1206 04/26/21  1256 12/10/20  0656 11/27/20  0000   HGB  --  12.1  --   --  11.7  --   --    PLT  --  214  --   --  193  --   --      --  135 140  --    < >   < >   POTASSIUM 4.8  --  4.6 4.7  --    < >   < >   CR 1.24*  --  1.00 1.01  --    < >   < >   A1C  --  9.0*  --  8.7*  --   --    < >    < > = values in this interval not displayed.        Diagnostics:  EKG 8/2/21: sinus bradycardia  Labs pending      Revised Cardiac Risk Index (RCRI):  The patient has the following serious cardiovascular risks for perioperative complications:   - Diabetes Mellitus (on Insulin) = 1 point     RCRI Interpretation: 1 point: Class II (low risk - 0.9% complication rate)    Signed Electronically by: Maude Ortiz PA-C  Copy of this evaluation report is provided to requesting physician.

## 2021-09-28 ENCOUNTER — TELEPHONE (OUTPATIENT)
Dept: FAMILY MEDICINE | Facility: CLINIC | Age: 76
End: 2021-09-28
Payer: MEDICARE

## 2021-09-28 LAB
ANION GAP SERPL CALCULATED.3IONS-SCNC: 7 MMOL/L (ref 3–14)
BUN SERPL-MCNC: 29 MG/DL (ref 7–30)
CALCIUM SERPL-MCNC: 9.3 MG/DL (ref 8.5–10.1)
CHLORIDE BLD-SCNC: 105 MMOL/L (ref 94–109)
CO2 SERPL-SCNC: 26 MMOL/L (ref 20–32)
CREAT SERPL-MCNC: 1.1 MG/DL (ref 0.52–1.04)
GFR SERPL CREATININE-BSD FRML MDRD: 50 ML/MIN/1.73M2
GLUCOSE BLD-MCNC: 226 MG/DL (ref 70–99)
POTASSIUM BLD-SCNC: 5.1 MMOL/L (ref 3.4–5.3)
SODIUM SERPL-SCNC: 138 MMOL/L (ref 133–144)

## 2021-09-28 NOTE — TELEPHONE ENCOUNTER
Pls tell patient thanks for the update on her surgery date.  Pls let her know that I suspect she'll need to make another preop appointment since it should be done within 30 days of her surgery.

## 2021-09-28 NOTE — TELEPHONE ENCOUNTER
Provider FYI:    Pt called stating her abd + rectal surgery scheduled for 10.07.21 was cancelled by [facility] because they need the beds for COVID Pt's.  Has been R/S to 11.11.21 at 8am. Pt had preop on 09.27.21

## 2021-09-28 NOTE — TELEPHONE ENCOUNTER
Called patient back, relayed message - already has a pre-op appt set up with you for 10/28/2021 at 11 am.    Roslyn ROJAS MA on 9/28/2021 at 4:03 PM

## 2021-09-29 ENCOUNTER — OFFICE VISIT (OUTPATIENT)
Dept: ENDOCRINOLOGY | Facility: CLINIC | Age: 76
End: 2021-09-29
Payer: MEDICARE

## 2021-09-29 VITALS
BODY MASS INDEX: 39.01 KG/M2 | SYSTOLIC BLOOD PRESSURE: 158 MMHG | HEART RATE: 55 BPM | DIASTOLIC BLOOD PRESSURE: 69 MMHG | HEIGHT: 60 IN | WEIGHT: 198.7 LBS | OXYGEN SATURATION: 100 % | TEMPERATURE: 97.9 F

## 2021-09-29 DIAGNOSIS — E11.9 TYPE 2 DIABETES, HBA1C GOAL < 7% (H): Primary | ICD-10-CM

## 2021-09-29 DIAGNOSIS — C73 PAPILLARY CARCINOMA OF THYROID (H): ICD-10-CM

## 2021-09-29 DIAGNOSIS — C73 MALIGNANT NEOPLASM OF THYROID GLAND (H): ICD-10-CM

## 2021-09-29 PROCEDURE — 99205 OFFICE O/P NEW HI 60 MIN: CPT | Performed by: INTERNAL MEDICINE

## 2021-09-29 RX ORDER — FLASH GLUCOSE SENSOR
KIT MISCELLANEOUS
Qty: 2 EACH | Refills: 11 | Status: SHIPPED | OUTPATIENT
Start: 2021-09-29 | End: 2022-10-31

## 2021-09-29 RX ORDER — INSULIN DEGLUDEC 100 U/ML
22 INJECTION, SOLUTION SUBCUTANEOUS EVERY MORNING
Qty: 5 ML | Refills: 11 | Status: SHIPPED | OUTPATIENT
Start: 2021-09-29 | End: 2022-05-13

## 2021-09-29 ASSESSMENT — MIFFLIN-ST. JEOR: SCORE: 1322.8

## 2021-09-29 ASSESSMENT — PAIN SCALES - GENERAL: PAINLEVEL: MODERATE PAIN (5)

## 2021-09-29 NOTE — PROGRESS NOTES
Chasity Hodgson is a 75 year old yo female here to establish care for Diabetes Mellitus.  Previous endocrinologist retired 9 months ago, formulary for DM meds had changed, and has had difficulty changing to new long acting insulin, feels she has been floating without guidance for her diabetes    1) Diabetes Mellitus    Diabetes History:  Diagnosis: 2015, picked up on routine labs  Hospitalizations: None  Previous Regimens: Farxia (difficulty with frequent urination), Toujeo (was working well but insurance formulary changed 1/1/2021)  Current Regimen: Tresiba 15-18u daily, Sitagliptin (Januiva) 50mg daily     has parkinson's living in LTC    BG check- daily   Trends- AM fasting- 198- 256    Complications: none known    Last eye exam: Nov 2020, no retinopathy  Foot Exam: [facility], checks every 60 days   Blood pressure- Lisinopril 40mg daily, Atenolol 50mg daily  Lipids- ezetimibe 10 mg daily, fenofibrate 145mg daily  SIOBHAN last 7/7- 25.52    24hr Recall:  Breakfast- oatmeal, with fresh raspberries or strawberries      2) Hypothyroidism s/p thyroidectomy for PTC  Diagnosis: known 3 nodules that were being monitored, and in 2002 s/p thyroidectomy (nodule in isthmus was cancer), s/p BUTLER (per patient, unclear if clean margins so received BUTLER, unknown dose)  Treatment: Levothyroxine 112mcg  Residual tissue on R that has been monitored, not growing.   Last ultrasound 2015, last biomarkers: unknown, none in [facility]    3) Vitamin D Deficiency  - Taking 2 tab daily  - Most recent 7/7/2021- 74  - PTH 9 (9/16/2021)    BP Readings from Last 3 Encounters:   09/29/21 (!) 158/69   09/27/21 (!) 148/57   09/07/21 (!) 150/76       Lab Results   Component Value Date    A1C 9.0 08/02/2021    A1C 8.7 07/07/2021    A1C 6.2 11/27/2020    A1C 6.2 11/27/2020    A1C 6.8 09/10/2019       Recent Labs   Lab Test 03/29/19  1355 01/29/18  0000 08/01/17  0000 02/15/17  0000 02/11/16  0000 03/31/15  1327   CHOL 196 185   < >  142   < > 158   HDL 27* 39*   < > 31   < > 34*   LDL 99 105*  --  49   < > 51   TRIG 349* 290*   < > 309   < > 365*   CHOLHDLRATIO  --   --   --  4.6  --  4.6    < > = values in this interval not displayed.       Lab Results   Component Value Date    MICROL 23 07/07/2021     No results found for: MICROALBUMIN      Wt Readings from Last 3 Encounters:   09/29/21 90.1 kg (198 lb 11.2 oz)   09/27/21 90.2 kg (198 lb 12.8 oz)   09/07/21 90.3 kg (199 lb 1.6 oz)       Current Outpatient Medications   Medication Sig Dispense Refill     albuterol (PROAIR HFA/PROVENTIL HFA/VENTOLIN HFA) 108 (90 Base) MCG/ACT inhaler Inhale 2-4 puffs into the lungs every 2 hours as needed for shortness of breath / dyspnea 1 Inhaler 1     amLODIPine (NORVASC) 2.5 MG tablet Take 5 mg by mouth 2 times daily Take 2.5mg in AM, 2.5mg at noon, and 5mg at bedtime       aspirin (SB LOW DOSE ASA EC) 81 MG EC tablet Take 81 mg by mouth daily       atenolol (TENORMIN) 50 MG tablet Take 1 tablet (50 mg) by mouth 2 times daily 180 tablet 3     azelastine (ASTEPRO) 0.15 % nasal spray 1 spray       Cholecalciferol (VITAMIN D-3 PO) Take 2 tablets by mouth       clotrimazole (LOTRIMIN) 1 % cream Apply topically daily       Continuous Blood Gluc Sensor (Surface TensionSTYLE BRANDY 14 DAY SENSOR) [facility] Apply 1 sensor and change every 14 days. 2 each 11     diphenhydrAMINE-acetaminophen (TYLENOL PM)  MG tablet Take 1 tablet by mouth At Bedtime Reported on 3/20/2017       ezetimibe (ZETIA) 10 MG tablet Take 1 tablet (10 mg) by mouth daily 90 tablet 3     famotidine (PEPCID) 20 MG tablet Take 2 tablets (40 mg) by mouth as needed 180 tablet 3     fenofibrate (TRICOR) 145 MG tablet Take 1 tablet (145 mg) by mouth daily 90 tablet 3     ferrous sulfate (IRON) 325 (65 FE) MG tablet Take 325 mg by mouth daily (with breakfast)       fexofenadine (ALLEGRA) 180 MG tablet Take 180 mg by mouth daily. 120 0     fluocinolone acetonide (DERMA SMOOTHE/FS BODY) 0.01 % external oil Apply 2  mL to scalp once per week. Massage into scalp. Can be left in overnight or washed out after 4-6 hours. 118.28 mL 5     fluticasone (FLONASE) 50 MCG/ACT spray Spray 2 sprays in nostril daily 2 sprays in each nostril qd 1 Bottle 0     furosemide (LASIX) 20 MG tablet Take 20 mg by mouth daily       gabapentin (NEURONTIN) 100 MG capsule Take 1 capsule (100 mg) by mouth every evening as needed 90 capsule 3     levothyroxine (SYNTHROID) 112 MCG tablet Take 112 mcg by mouth daily Take 112 mcg daily except for Friday takes only 56 mcg.  Brand name Synthroid       lisinopril (ZESTRIL) 40 MG tablet TAKE ONE TABLET BY MOUTH ONCE DAILY 90 tablet 3     metoclopramide (REGLAN) 5 MG tablet Take 1-2 tablets (5-10 mg) by mouth 2 times daily as needed a 120 tablet 0     MULTIVITAMINS OR TABS ONE DAILY 100 3     nitroGLYcerin (NITROSTAT) 0.4 MG sublingual tablet Place 1 tablet (0.4 mg) under the tongue every 5 minutes as needed for chest pain (no more than 3 in one hour; after 3rd, call 911.) 25 tablet 3     nystatin (MYCOSTATIN) cream Apply topically daily as needed        nystatin-triamcinolone (MYCOLOG II) cream Apply topically daily as needed        ondansetron (ZOFRAN) 4 MG tablet Take 1 tablet by mouth every 6 hours as needed Reported on 3/20/2017       order for DME Equipment being ordered: Compression stockings - Knee High; 20-30 mmHg compression - note would like adhesive band to keep the stocking from sliding down 3 each 0     order for DME Equipment being ordered: Oral appliance for sleep apnea 1 Units 0     pantoprazole (PROTONIX) 40 MG EC tablet Take 40 mg by mouth 2 times daily       sitagliptin (JANUVIA) 50 MG tablet Take 1 tablet (50 mg) by mouth daily 90 tablet 3     TRESIBA FLEXTOUCH 100 UNIT/ML pen Inject 22 Units Subcutaneous every morning 5 mL 11     tretinoin (RETIN-A) 0.025 % external cream Use every night as tolerated - spot treat lesion 20 g 3       Histories reviewed and updated in [facility].  Past Medical  History:   Diagnosis Date     Abdominal adhesions 1984, 96,99    s/p lysis     Abdominal adhesions      Acne rosacea      Allergic rhinitis      Allergic rhinitis, cause unspecified      Alopecia      Anemia      CAD (coronary artery disease)      Carotid stenosis      CKD (chronic kidney disease) stage 2, GFR 60-89 ml/min      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)      CPAP (continuous positive airway pressure) dependence      Depression      Diabetic gastroparesis (H)      Diet-controlled type 2 diabetes mellitus (H)      DM2 (diabetes mellitus, type 2) (H)      DVT (deep venous thrombosis) (H)      DVT of axillary vein, acute right (H)      Fibromyalgia      Gastro-oesophageal reflux disease      GERD (gastroesophageal reflux disease)      Hernia of unspecified site of abdominal cavity without mention of obstruction or gangrene     bilateral     History of blood transfusion     10/ 1980     HTN (hypertension)      HTN (hypertension)      Hypertriglyceridemia      Hypertriglyceridemia      Hypokalemia      Hypothyroidism      Obstructive sleep apnea      ANNETTE (obstructive sleep apnea)      ANNETTE on CPAP      Osteoarthritis of glenohumeral joint      Papillary carcinoma of thyroid (H)     s/p thyroidectomy - Ruegemer     Papillary carcinoma of thyroid (H)      PE (pulmonary embolism)      Poliomyelitis     poor circulation right leg     Poliomyelitis      Postsurgical hypothyroidism     s/p papillary thryoid cancer - Ruegemer     Pulmonary embolism (H)      Pulmonary embolus (H)      Pulmonary nodule      Rosacea      S/P carpal tunnel release     bilateral     S/P hardware removal 01/2014    still with lingering foot pain     S/P shoulder surgery     bilateral     Septic joint (H)     right knee     Septic joint of right knee joint (H)      Venous insufficiency      Venous insufficiency      Venous thrombosis 1999    right axillary vein       Past Surgical History:   Procedure Laterality Date     AMPUTATE TOE(S)  3/15/2012     Procedure:AMPUTATE TOE(S); Surgeon:SUKHDEEP METZ; Location: OR     AMPUTATE TOE(S)       APPENDECTOMY  1972     APPENDECTOMY       ARTHRODESIS FOOT  3/15/2012    Procedure:ARTHRODESIS FOOT; RIGHT TRIPLE ARTHRODESIS, FIFTH TOE AMPUTATION, LATERAL LIGAMENT RECONSTRUCTION, TENDON TRANSFER AND RELEASE [MINI C-ARM, ARTHREX 4.5 AND 6.7 STAPLES, BIOCOMPOSITE TENODESIS SCREWS]; Surgeon:SUKHDEEP METZ; Location: OR     ARTHRODESIS FOOT Right     Right foot triple arthrodesis and removal of hardware     ARTHROSCOPY SHOULDER  06/25/2015    REVISION SUBACROMIAL DECOMPRESSION, EXCISION OF GANGLION CYST, DEBRIDEMENT AND EXCISION OF THE GLENOHUMERAL JOINT GANGLION CYST, CORACOID DECOMPRESSION, POSSIBLE SUBSCAPULARIS REPAIR AND OPEN SUBSCAPULARIS BICEP     ARTHROSCOPY SHOULDER ROTATOR CUFF REPAIR       BIOPSY  Thyroid 2002     BREAST SURGERY  Biopsy     C FREEING BOWEL ADHESION,ENTEROLYSIS      1986, 1996, 1999     C TOTAL ABDOM HYSTERECTOMY  1980    + BSO     CHOLECYSTECTOMY       CHOLECYSTECTOMY       COLONOSCOPY  2018     COLOSTOMY  2/7/2012    Procedure:COLOSTOMY; CREATION OF SIGMOID COLOSTOMY AND EXTENSIVE  LYSIS OF ADHESIONS; Surgeon:MONTSERRAT BENDER; Location: OR     COLOSTOMY       EYE SURGERY       GENITOURINARY SURGERY  1999     GI SURGERY      weakened rectal sphincter with artificial stimulator     HERNIA REPAIR  1976     HYSTERECTOMY TOTAL ABDOMINAL       LAPAROTOMY, LYSIS ADHESIONS, COMBINED  2/7/2012    Procedure:COMBINED LAPAROTOMY, LYSIS ADHESIONS; Surgeon:MONTSERRAT BENDER; Location: OR     RELEASE CARPAL TUNNEL       RELEASE TENDON FOOT  3/15/2012    Procedure:RELEASE TENDON FOOT; Surgeon:SUKHDEEP METZ; Location: OR     REMOVE HARDWARE FOOT  12/13/2012    Procedure: REMOVE HARDWARE FOOT;  RIGHT FOOT REMOVAL OF HARDWARE;  Surgeon: Sukhdeep Metz MD;  Location:  OR     SHOULDER SURGERY  11/12/2020    LEFT SHOULDER HEMIARHTROPLASTY, BICEP TENODESIS     SOFT  TISSUE SURGERY  2018, 2020     TENDON RELEASE      foot     THYROIDECTOMY       ZZC NONSPECIFIC PROCEDURE      throidectomy       Allergies:  Nsaids, Toradol, Celecoxib, Codeine, Crestor [rosuvastatin], No clinical screening - see comments, Vioxx, Conjugated estrogens, and Sulfa drugs    Social History     Tobacco Use     Smoking status: Never Smoker     Smokeless tobacco: Never Used   Substance Use Topics     Alcohol use: Not Currently     Alcohol/week: 0.0 standard drinks       Family History   Problem Relation Age of Onset     Arthritis Mother      Hypertension Mother      Cerebrovascular Disease Mother      Obesity Mother      Heart Disease Mother         MI's     Hypertension Father      Respiratory Father         Adult RDS     Diabetes Father         adult     Arthritis Sister      Cancer Sister      Diabetes Sister      Hypertension Sister      Breast Cancer Sister      Depression Sister      Thyroid Disease Sister      Obesity Sister      Arthritis Sister      Hypertension Sister      Thyroid Disease Sister      Cancer Sister         colon polup     Heart Disease Brother         MI at 54     Other Cancer Brother      Hypertension Sister      Osteoporosis Sister      Obesity Sister      Lipids Brother      Hypertension Brother      Diabetes Brother      Hyperlipidemia Brother      Lipids Sister      Obesity Sister      Obesity Maternal Grandmother      Skin Cancer Maternal Grandmother         skin cancer unknown     Cancer Maternal Grandmother         unknown skin cancer on face     Obesity Paternal Grandmother           REVIEW OF SYSTEMS:   ROS: 10 point ROS neg other than the symptoms noted above in the HPI.      EXAM:  Vitals: BP (!) 158/69   Pulse 55   Temp 97.9  F (36.6  C) (Oral)   Ht 1.524 m (5')   Wt 90.1 kg (198 lb 11.2 oz)   SpO2 100%   BMI 38.81 kg/m      BMIE= Body mass index is 38.81 kg/m .  Exam:  Constitutional: healthy, alert, no acute distress  Head: Normocephalic. No masses, lesions,  no exophthalmos/proptosis  ENT: no visible lesions/nodules  Respiratory: nonlabored  Gastrointestinal: Abdomen soft, non-tender.  Musculoskeletal: extremities normal- no gross deformities noted, gait normal and normal muscle tone  Skin: no suspicious lesions or rashes  Neurologic: Gait normal with walker. sensation grossly intact  Psychiatric: mentation appears normal, calm    Foot Exam: deferred today    ASSESSMENT/PLAN:  No diagnosis found.  Orders Placed This Encounter   Procedures     US Thyroid     Thyroglobulin and Antibody (Sendout to [facility])     TSH with free T4 reflex     Hemoglobin A1c       1) Diabetes Mellitus   - Glucose Control- not at goal, unclear how BG vary throughout the day, but no evidence of hypos. Will increase Tresiba by20% --> 22u qAM, continue sitagliptin 50mg (renally dosed), restart freestyle daryn, sample given today and RX sent to specialty pharmacy  - Cardiovascular Risk- continue ezetimibe, fenofibrate, myalgias with statin (can consider restarting at lower dose at next visit)  - Ophthalmology- uptodate, instructed to return Nov 2021.  - Podiatry- patient instructed on routine foot care, follows with podiatry  - Renal- Uptodate, SIOBHAN due next visit, continue lisinopril  - At next visit, would consider discussion of addition of SGLT2 inhibitor again (lower dose?) for renoprotection.     2) Blood pressure- mildly elevated, encourage home testing. Continue lisinopril, atenolol, goal <130    3) Cardiovascular Risk-   - Continue ezetimibe, fenofibrate. Consider discussion of statin at next visit.  - Continue aspirin 81mg daily    4) Hypothyroidism, postsurgical  - TSH/FT4 at goal  - Continue LT4 112    5) History of Papillary Thyroid [place]  - No surveillance done in years (resection in 2002)  - Will check ultrasound and TG/TGAb levels    RTC- 3 months      A total of 80 minutes were spent on this visit including documentation, chart review with greater than 50% of time spent on direct  counseling.

## 2021-09-29 NOTE — NURSING NOTE
Chief Complaint   Patient presents with     New Patient     Diabetes       Vitals:    09/29/21 0947   BP: (!) 158/69   Pulse: 55   Temp: 97.9  F (36.6  C)   TempSrc: Oral   SpO2: 100%   Weight: 90.1 kg (198 lb 11.2 oz)   Height: 1.524 m (5')       Body mass index is 38.81 kg/m .      Anitha Orellana MA

## 2021-09-29 NOTE — LETTER
9/29/2021         RE: Chasity Hodgson  [id number]  [facility] [address]        Dear Colleague,    Thank you for referring your patient, Chasity Hodgson, to the [facility] [place]. Please see a copy of my visit note below.    Chasity Hodgson is a 75 year old yo female here to establish care for Diabetes Mellitus.  Previous endocrinologist retired 9 months ago, formulary for DM meds had changed, and has had difficulty changing to new long acting insulin, feels she has been floating without guidance for her diabetes    1) Diabetes Mellitus    Diabetes History:  Diagnosis: 2015, picked up on routine labs  Hospitalizations: None  Previous Regimens: Farxia (difficulty with frequent urination), Toujeo (was working well but insurance formulary changed 1/1/2021)  Current Regimen: Tresiba 15-18u daily, Sitagliptin (Januiva) 50mg daily     has parkinson's living in LTC    BG check- daily   Trends- AM fasting- 198- 256    Complications: none known    Last eye exam: Nov 2020, no retinopathy  Foot Exam: [facility], checks every 60 days   Blood pressure- Lisinopril 40mg daily, Atenolol 50mg daily  Lipids- ezetimibe 10 mg daily, fenofibrate 145mg daily  SIOBHAN last 7/7- 25.52    24hr Recall:  Breakfast- oatmeal, with fresh raspberries or strawberries      2) Hypothyroidism s/p thyroidectomy for PTC  Diagnosis: known 3 nodules that were being monitored, and in 2002 s/p thyroidectomy (nodule in isthmus was cancer), s/p BUTLER (per patient, unclear if clean margins so received BUTLER, unknown dose)  Treatment: Levothyroxine 112mcg  Residual tissue on R that has been monitored, not growing.   Last ultrasound 2015, last biomarkers: unknown, none in [place] system    3) Vitamin D Deficiency  - Taking 2 tab daily  - Most recent 7/7/2021- 74  - PTH 9 (9/16/2021)    BP Readings from Last 3 Encounters:   09/29/21 (!) 158/69   09/27/21 (!) 148/57   09/07/21 (!) 150/76       Lab Results   Component Value  Date    A1C 9.0 08/02/2021    A1C 8.7 07/07/2021    A1C 6.2 11/27/2020    A1C 6.2 11/27/2020    A1C 6.8 09/10/2019       Recent Labs   Lab Test 03/29/19  1355 01/29/18  0000 08/01/17  0000 02/15/17  0000 02/11/16  0000 03/31/15  1327   CHOL 196 185   < > 142   < > 158   HDL 27* 39*   < > 31   < > 34*   LDL 99 105*  --  49   < > 51   TRIG 349* 290*   < > 309   < > 365*   CHOLHDLRATIO  --   --   --  4.6  --  4.6    < > = values in this interval not displayed.       Lab Results   Component Value Date    MICROL 23 07/07/2021     No results found for: MICROALBUMIN      Wt Readings from Last 3 Encounters:   09/29/21 90.1 kg (198 lb 11.2 oz)   09/27/21 90.2 kg (198 lb 12.8 oz)   09/07/21 90.3 kg (199 lb 1.6 oz)       Current Outpatient Medications   Medication Sig Dispense Refill     albuterol (PROAIR HFA/PROVENTIL HFA/VENTOLIN HFA) 108 (90 Base) MCG/ACT inhaler Inhale 2-4 puffs into the lungs every 2 hours as needed for shortness of breath / dyspnea 1 Inhaler 1     amLODIPine (NORVASC) 2.5 MG tablet Take 5 mg by mouth 2 times daily Take 2.5mg in AM, 2.5mg at noon, and 5mg at bedtime       aspirin (SB LOW DOSE ASA EC) 81 MG EC tablet Take 81 mg by mouth daily       atenolol (TENORMIN) 50 MG tablet Take 1 tablet (50 mg) by mouth 2 times daily 180 tablet 3     azelastine (ASTEPRO) 0.15 % nasal spray 1 spray       Cholecalciferol (VITAMIN D-3 PO) Take 2 tablets by mouth       clotrimazole (LOTRIMIN) 1 % cream Apply topically daily       Continuous Blood Gluc Sensor (FREESTYLE BRANDY 14 DAY SENSOR) MISC Apply 1 sensor and change every 14 days. 2 each 11     diphenhydrAMINE-acetaminophen (TYLENOL PM)  MG tablet Take 1 tablet by mouth At Bedtime Reported on 3/20/2017       ezetimibe (ZETIA) 10 MG tablet Take 1 tablet (10 mg) by mouth daily 90 tablet 3     famotidine (PEPCID) 20 MG tablet Take 2 tablets (40 mg) by mouth as needed 180 tablet 3     fenofibrate (TRICOR) 145 MG tablet Take 1 tablet (145 mg) by mouth daily 90  tablet 3     ferrous sulfate (IRON) 325 (65 FE) MG tablet Take 325 mg by mouth daily (with breakfast)       fexofenadine (ALLEGRA) 180 MG tablet Take 180 mg by mouth daily. 120 0     fluocinolone acetonide (DERMA SMOOTHE/FS BODY) 0.01 % external oil Apply 2 mL to scalp once per week. Massage into scalp. Can be left in overnight or washed out after 4-6 hours. 118.28 mL 5     fluticasone (FLONASE) 50 MCG/ACT spray Spray 2 sprays in nostril daily 2 sprays in each nostril qd 1 Bottle 0     furosemide (LASIX) 20 MG tablet Take 20 mg by mouth daily       gabapentin (NEURONTIN) 100 MG capsule Take 1 capsule (100 mg) by mouth every evening as needed 90 capsule 3     levothyroxine (SYNTHROID) 112 MCG tablet Take 112 mcg by mouth daily Take 112 mcg daily except for Friday takes only 56 mcg.  Brand name Synthroid       lisinopril (ZESTRIL) 40 MG tablet TAKE ONE TABLET BY MOUTH ONCE DAILY 90 tablet 3     metoclopramide (REGLAN) 5 MG tablet Take 1-2 tablets (5-10 mg) by mouth 2 times daily as needed a 120 tablet 0     MULTIVITAMINS OR TABS ONE DAILY 100 3     nitroGLYcerin (NITROSTAT) 0.4 MG sublingual tablet Place 1 tablet (0.4 mg) under the tongue every 5 minutes as needed for chest pain (no more than 3 in one hour; after 3rd, call 911.) 25 tablet 3     nystatin (MYCOSTATIN) cream Apply topically daily as needed        nystatin-triamcinolone (MYCOLOG II) cream Apply topically daily as needed        ondansetron (ZOFRAN) 4 MG tablet Take 1 tablet by mouth every 6 hours as needed Reported on 3/20/2017       order for DME Equipment being ordered: Compression stockings - Knee High; 20-30 mmHg compression - note would like adhesive band to keep the stocking from sliding down 3 each 0     order for DME Equipment being ordered: Oral appliance for sleep apnea 1 Units 0     pantoprazole (PROTONIX) 40 MG EC tablet Take 40 mg by mouth 2 times daily       sitagliptin (JANUVIA) 50 MG tablet Take 1 tablet (50 mg) by mouth daily 90 tablet 3      TRESIBA FLEXTOUCH 100 UNIT/ML pen Inject 22 Units Subcutaneous every morning 5 mL 11     tretinoin (RETIN-A) 0.025 % external cream Use every night as tolerated - spot treat lesion 20 g 3       Histories reviewed and updated in Epic.  Past Medical History:   Diagnosis Date     Abdominal adhesions 1984, 96,99    s/p lysis     Abdominal adhesions      Acne rosacea      Allergic rhinitis      Allergic rhinitis, cause unspecified      Alopecia      Anemia      CAD (coronary artery disease)      Carotid stenosis      CKD (chronic kidney disease) stage 2, GFR 60-89 ml/min      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)      CPAP (continuous positive airway pressure) dependence      Depression      Diabetic gastroparesis (H)      Diet-controlled type 2 diabetes mellitus (H)      DM2 (diabetes mellitus, type 2) (H)      DVT (deep venous thrombosis) (H)      DVT of axillary vein, acute right (H)      Fibromyalgia      Gastro-oesophageal reflux disease      GERD (gastroesophageal reflux disease)      Hernia of unspecified site of abdominal cavity without mention of obstruction or gangrene     bilateral     History of blood transfusion     10/ 1980     HTN (hypertension)      HTN (hypertension)      Hypertriglyceridemia      Hypertriglyceridemia      Hypokalemia      Hypothyroidism      Obstructive sleep apnea      ANNETTE (obstructive sleep apnea)      ANNETTE on CPAP      Osteoarthritis of glenohumeral joint      Papillary carcinoma of thyroid (H)     s/p thyroidectomy - Ruegemer     Papillary carcinoma of thyroid (H)      PE (pulmonary embolism)      Poliomyelitis     poor circulation right leg     Poliomyelitis      Postsurgical hypothyroidism     s/p papillary thryoid cancer - Ruegemer     Pulmonary embolism (H)      Pulmonary embolus (H)      Pulmonary nodule      Rosacea      S/P carpal tunnel release     bilateral     S/P hardware removal 01/2014    still with lingering foot pain     S/P shoulder surgery     bilateral     Septic joint (H)      right knee     Septic joint of right knee joint (H)      Venous insufficiency      Venous insufficiency      Venous thrombosis 1999    right axillary vein       Past Surgical History:   Procedure Laterality Date     AMPUTATE TOE(S)  3/15/2012    Procedure:AMPUTATE TOE(S); Surgeon:RUBEN MOSES; Location: OR     AMPUTATE TOE(S)       APPENDECTOMY  1972     APPENDECTOMY       ARTHRODESIS FOOT  3/15/2012    Procedure:ARTHRODESIS FOOT; RIGHT TRIPLE ARTHRODESIS, FIFTH TOE AMPUTATION, LATERAL LIGAMENT RECONSTRUCTION, TENDON TRANSFER AND RELEASE [MINI C-ARM, ARTHREX 4.5 AND 6.7 STAPLES, BIOCOMPOSITE TENODESIS SCREWS]; Surgeon:RUBEN MOSES; Location: OR     ARTHRODESIS FOOT Right     Right foot triple arthrodesis and removal of hardware     ARTHROSCOPY SHOULDER  06/25/2015    REVISION SUBACROMIAL DECOMPRESSION, EXCISION OF GANGLION CYST, DEBRIDEMENT AND EXCISION OF THE GLENOHUMERAL JOINT GANGLION CYST, CORACOID DECOMPRESSION, POSSIBLE SUBSCAPULARIS REPAIR AND OPEN SUBSCAPULARIS BICEP     ARTHROSCOPY SHOULDER ROTATOR CUFF REPAIR       BIOPSY  Thyroid 2002     BREAST SURGERY  Biopsy     C FREEING BOWEL ADHESION,ENTEROLYSIS      1986, 1996, 1999     C TOTAL ABDOM HYSTERECTOMY  1980    + BSO     CHOLECYSTECTOMY       CHOLECYSTECTOMY       COLONOSCOPY  2018     COLOSTOMY  2/7/2012    Procedure:COLOSTOMY; CREATION OF SIGMOID COLOSTOMY AND EXTENSIVE  LYSIS OF ADHESIONS; Surgeon:MONTSERRAT BENDER P; Location: OR     COLOSTOMY       EYE SURGERY       GENITOURINARY SURGERY  1999     GI SURGERY      weakened rectal sphincter with artificial stimulator     HERNIA REPAIR  1976     HYSTERECTOMY TOTAL ABDOMINAL       LAPAROTOMY, LYSIS ADHESIONS, COMBINED  2/7/2012    Procedure:COMBINED LAPAROTOMY, LYSIS ADHESIONS; Surgeon:MONTSERRAT BENDER P; Location: OR     RELEASE CARPAL TUNNEL       RELEASE TENDON FOOT  3/15/2012    Procedure:RELEASE TENDON FOOT; Surgeon:RUBEN MOSES; Location: OR     REMOVE  HARDWARE FOOT  12/13/2012    Procedure: REMOVE HARDWARE FOOT;  RIGHT FOOT REMOVAL OF HARDWARE;  Surgeon: Sukhdeep Metz MD;  Location: [facility] OR     SHOULDER SURGERY  11/12/2020    LEFT SHOULDER HEMIARHTROPLASTY, BICEP TENODESIS     SOFT TISSUE SURGERY  2018, 2020     TENDON RELEASE      foot     THYROIDECTOMY       ZZC NONSPECIFIC PROCEDURE      throidectomy       Allergies:  Nsaids, Toradol, Celecoxib, Codeine, Crestor [rosuvastatin], No clinical screening - see comments, Vioxx, Conjugated estrogens, and Sulfa drugs    Social History     Tobacco Use     Smoking status: Never Smoker     Smokeless tobacco: Never Used   Substance Use Topics     Alcohol use: Not Currently     Alcohol/week: 0.0 standard drinks       Family History   Problem Relation Age of Onset     Arthritis Mother      Hypertension Mother      Cerebrovascular Disease Mother      Obesity Mother      Heart Disease Mother         MI's     Hypertension Father      Respiratory Father         Adult RDS     Diabetes Father         adult     Arthritis Sister      Cancer Sister      Diabetes Sister      Hypertension Sister      Breast Cancer Sister      Depression Sister      Thyroid Disease Sister      Obesity Sister      Arthritis Sister      Hypertension Sister      Thyroid Disease Sister      Cancer Sister         colon polup     Heart Disease Brother         MI at 54     Other Cancer Brother      Hypertension Sister      Osteoporosis Sister      Obesity Sister      Lipids Brother      Hypertension Brother      Diabetes Brother      Hyperlipidemia Brother      Lipids Sister      Obesity Sister      Obesity Maternal Grandmother      Skin Cancer Maternal Grandmother         skin cancer unknown     Cancer Maternal Grandmother         unknown skin cancer on face     Obesity Paternal Grandmother           REVIEW OF SYSTEMS:   ROS: 10 point ROS neg other than the symptoms noted above in the HPI.      EXAM:  Vitals: BP (!) 158/69   Pulse 55   Temp 97.9   F (36.6  C) (Oral)   Ht 1.524 m (5')   Wt 90.1 kg (198 lb 11.2 oz)   SpO2 100%   BMI 38.81 kg/m      BMIE= Body mass index is 38.81 kg/m .  Exam:  Constitutional: healthy, alert, no acute distress  Head: Normocephalic. No masses, lesions, no exophthalmos/proptosis  ENT: no visible lesions/nodules  Respiratory: nonlabored  Gastrointestinal: Abdomen soft, non-tender.  Musculoskeletal: extremities normal- no gross deformities noted, gait normal and normal muscle tone  Skin: no suspicious lesions or rashes  Neurologic: Gait normal with walker. sensation grossly intact  Psychiatric: mentation appears normal, calm    Foot Exam: deferred today    ASSESSMENT/PLAN:  No diagnosis found.  Orders Placed This Encounter   Procedures     US Thyroid     Thyroglobulin and Antibody (Sendout to [facility])     TSH with free T4 reflex     Hemoglobin A1c       1) Diabetes Mellitus   - Glucose Control- not at goal, unclear how BG vary throughout the day, but no evidence of hypos. Will increase Tresiba by20% --> 22u qAM, continue sitagliptin 50mg (renally dosed), restart freestyle daryn, sample given today and RX sent to specialty pharmacy  - Cardiovascular Risk- continue ezetimibe, fenofibrate, myalgias with statin (can consider restarting at lower dose at next visit)  - Ophthalmology- uptodate, instructed to return Nov 2021.  - Podiatry- patient instructed on routine foot care, follows with podiatry  - Renal- Uptodate, SIOBHAN due next visit, continue lisinopril  - At next visit, would consider discussion of addition of SGLT2 inhibitor again (lower dose?) for renoprotection.     2) Blood pressure- mildly elevated, encourage home testing. Continue lisinopril, atenolol, goal <130    3) Cardiovascular Risk-   - Continue ezetimibe, fenofibrate. Consider discussion of statin at next visit.  - Continue aspirin 81mg daily    4) Hypothyroidism, postsurgical  - TSH/FT4 at goal  - Continue LT4 112    5) History of Papillary Thyroid [place]  - No  surveillance done in years (resection in 2002)  - Will check ultrasound and TG/TGAb levels    RTC- 3 months      A total of 80 minutes were spent on this visit including documentation, chart review with greater than 50% of time spent on direct counseling.          Again, thank you for allowing me to participate in the care of your patient.        Sincerely,        Odette Weston MD

## 2021-09-29 NOTE — PATIENT INSTRUCTIONS
Scan Vini sensor before each meal and before bedtime  Occasionally, scan 2hr after you eat     Increase Tresiba 22u in the morning. If your blood sugar is  Less than 120, take 11units  Goal blood sugar is <150.     Continue Januvia    Check thyroid ultrasound and labs before next visit.

## 2021-10-04 ENCOUNTER — HOSPITAL ENCOUNTER (OUTPATIENT)
Dept: ULTRASOUND IMAGING | Facility: CLINIC | Age: 76
Discharge: HOME | End: 2021-10-04
Attending: INTERNAL MEDICINE | Admitting: INTERNAL MEDICINE
Payer: MEDICARE

## 2021-10-04 DIAGNOSIS — C73 PAPILLARY CARCINOMA OF THYROID (H): ICD-10-CM

## 2021-10-04 PROCEDURE — 76536 US EXAM OF HEAD AND NECK: CPT

## 2021-10-05 ENCOUNTER — TRANSFERRED RECORDS (OUTPATIENT)
Dept: HEALTH INFORMATION MANAGEMENT | Facility: CLINIC | Age: 76
End: 2021-10-05

## 2021-10-07 ENCOUNTER — LAB (OUTPATIENT)
Dept: LAB | Facility: CLINIC | Age: 76
End: 2021-10-07
Payer: MEDICARE

## 2021-10-07 DIAGNOSIS — E11.9 TYPE 2 DIABETES, HBA1C GOAL < 7% (H): ICD-10-CM

## 2021-10-07 DIAGNOSIS — C73 PAPILLARY CARCINOMA OF THYROID (H): ICD-10-CM

## 2021-10-07 LAB — HBA1C MFR BLD: 9.5 % (ref 0–5.6)

## 2021-10-07 PROCEDURE — 86800 THYROGLOBULIN ANTIBODY: CPT | Mod: 90

## 2021-10-07 PROCEDURE — 36415 COLL VENOUS BLD VENIPUNCTURE: CPT

## 2021-10-07 PROCEDURE — 84432 ASSAY OF THYROGLOBULIN: CPT | Mod: 90

## 2021-10-07 PROCEDURE — 99000 SPECIMEN HANDLING OFFICE-LAB: CPT

## 2021-10-07 PROCEDURE — 84443 ASSAY THYROID STIM HORMONE: CPT

## 2021-10-07 PROCEDURE — 83036 HEMOGLOBIN GLYCOSYLATED A1C: CPT

## 2021-10-08 DIAGNOSIS — I10 BENIGN ESSENTIAL HYPERTENSION: Primary | ICD-10-CM

## 2021-10-08 LAB — TSH SERPL DL<=0.005 MIU/L-ACNC: 0.95 MU/L (ref 0.4–4)

## 2021-10-08 RX ORDER — FUROSEMIDE 20 MG
TABLET ORAL
Qty: 135 TABLET | Refills: 0 | OUTPATIENT
Start: 2021-10-08

## 2021-10-08 RX ORDER — FUROSEMIDE 20 MG
20 TABLET ORAL DAILY
Qty: 90 TABLET | Refills: 1 | Status: SHIPPED | OUTPATIENT
Start: 2021-10-08 | End: 2022-10-12

## 2021-10-08 NOTE — TELEPHONE ENCOUNTER
Triage called patient. Reports she takes Furosemide  20 mg 1 tablet daily.     Routing refill request to provider for review/approval because:  Medication is reported/historical  BP and labs out of range:          BP Readings from Last 3 Encounters:   09/29/21 (!) 158/69   09/27/21 (!) 148/57   09/07/21 (!) 150/76     Creatinine   Date Value Ref Range Status   09/27/2021 1.10 (H) 0.52 - 1.04 mg/dL Final   07/07/2021 1.01 0.52 - 1.04 mg/dL Final

## 2021-10-18 LAB — SCANNED LAB RESULT: NORMAL

## 2021-10-19 PROBLEM — F32.9 MAJOR DEPRESSION: Status: ACTIVE | Noted: 2019-03-29

## 2021-10-28 ENCOUNTER — OFFICE VISIT (OUTPATIENT)
Dept: FAMILY MEDICINE | Facility: CLINIC | Age: 76
End: 2021-10-28
Payer: MEDICARE

## 2021-10-28 VITALS
TEMPERATURE: 97.1 F | HEART RATE: 62 BPM | RESPIRATION RATE: 16 BRPM | OXYGEN SATURATION: 99 % | BODY MASS INDEX: 38.87 KG/M2 | SYSTOLIC BLOOD PRESSURE: 140 MMHG | DIASTOLIC BLOOD PRESSURE: 65 MMHG | HEIGHT: 60 IN | WEIGHT: 198 LBS

## 2021-10-28 DIAGNOSIS — Z93.3 COLOSTOMY IN PLACE (H): ICD-10-CM

## 2021-10-28 DIAGNOSIS — Z86.718 HISTORY OF DVT (DEEP VEIN THROMBOSIS): ICD-10-CM

## 2021-10-28 DIAGNOSIS — I10 BENIGN ESSENTIAL HYPERTENSION: ICD-10-CM

## 2021-10-28 DIAGNOSIS — N18.31 STAGE 3A CHRONIC KIDNEY DISEASE (H): ICD-10-CM

## 2021-10-28 DIAGNOSIS — Z01.818 PREOP GENERAL PHYSICAL EXAM: Primary | ICD-10-CM

## 2021-10-28 DIAGNOSIS — E11.9 TYPE 2 DIABETES, HBA1C GOAL < 7% (H): ICD-10-CM

## 2021-10-28 DIAGNOSIS — Z23 HIGH PRIORITY FOR 2019-NCOV VACCINE: ICD-10-CM

## 2021-10-28 DIAGNOSIS — C73 PAPILLARY CARCINOMA OF THYROID (H): ICD-10-CM

## 2021-10-28 DIAGNOSIS — D64.9 ANEMIA, UNSPECIFIED TYPE: ICD-10-CM

## 2021-10-28 LAB
ANION GAP SERPL CALCULATED.3IONS-SCNC: 5 MMOL/L (ref 3–14)
BUN SERPL-MCNC: 38 MG/DL (ref 7–30)
CALCIUM SERPL-MCNC: 9 MG/DL (ref 8.5–10.1)
CHLORIDE BLD-SCNC: 105 MMOL/L (ref 94–109)
CO2 SERPL-SCNC: 25 MMOL/L (ref 20–32)
CREAT SERPL-MCNC: 1.08 MG/DL (ref 0.52–1.04)
ERYTHROCYTE [DISTWIDTH] IN BLOOD BY AUTOMATED COUNT: 12.4 % (ref 10–15)
GFR SERPL CREATININE-BSD FRML MDRD: 50 ML/MIN/1.73M2
GLUCOSE BLD-MCNC: 250 MG/DL (ref 70–99)
HCT VFR BLD AUTO: 35.9 % (ref 35–47)
HGB BLD-MCNC: 12.2 G/DL (ref 11.7–15.7)
MCH RBC QN AUTO: 30.8 PG (ref 26.5–33)
MCHC RBC AUTO-ENTMCNC: 34 G/DL (ref 31.5–36.5)
MCV RBC AUTO: 91 FL (ref 78–100)
PLATELET # BLD AUTO: 289 10E3/UL (ref 150–450)
POTASSIUM BLD-SCNC: 4.4 MMOL/L (ref 3.4–5.3)
RBC # BLD AUTO: 3.96 10E6/UL (ref 3.8–5.2)
SODIUM SERPL-SCNC: 135 MMOL/L (ref 133–144)
WBC # BLD AUTO: 13.5 10E3/UL (ref 4–11)

## 2021-10-28 PROCEDURE — 93000 ELECTROCARDIOGRAM COMPLETE: CPT | Performed by: PHYSICIAN ASSISTANT

## 2021-10-28 PROCEDURE — 91300 COVID-19,PF,PFIZER (12+ YRS): CPT | Performed by: PHYSICIAN ASSISTANT

## 2021-10-28 PROCEDURE — 85027 COMPLETE CBC AUTOMATED: CPT | Performed by: PHYSICIAN ASSISTANT

## 2021-10-28 PROCEDURE — 99215 OFFICE O/P EST HI 40 MIN: CPT | Mod: 25 | Performed by: PHYSICIAN ASSISTANT

## 2021-10-28 PROCEDURE — 36415 COLL VENOUS BLD VENIPUNCTURE: CPT | Performed by: PHYSICIAN ASSISTANT

## 2021-10-28 PROCEDURE — 80048 BASIC METABOLIC PNL TOTAL CA: CPT | Performed by: PHYSICIAN ASSISTANT

## 2021-10-28 PROCEDURE — 0004A COVID-19,PF,PFIZER (12+ YRS): CPT | Performed by: PHYSICIAN ASSISTANT

## 2021-10-28 ASSESSMENT — MIFFLIN-ST. JEOR: SCORE: 1314.62

## 2021-10-28 NOTE — PATIENT INSTRUCTIONS
Stop all NSAIDs (motrin, ibuprofen, naproxen, aleve, advil, naprosyn, aspirin)  for at least 5 days prior to surgery.    Give Tresiba 10 units the am of surgery, unless AM BS<150, then only give 5 units.      Skip Sitagliptin (Januvia) the am of surgery    Skip lasix the am of surgery    Take protonix, amlodipine, levothyroxine, lisinopril, atenolol with a sip of water the morning of surgery.    Take evening medications as usual the night before surgery

## 2021-10-28 NOTE — PROGRESS NOTES
[address], [address]  [facility] [address]  Phone: [phone]  Primary Provider: Shola Benoit  Pre-op Performing Provider: JEANETTE MELTON      PREOPERATIVE EVALUATION:  Today's date: 10/28/2021    Chasity Hodgson is a 76 year old female who presents for a preoperative evaluation.    Surgical Information:   Surgery/Procedure: EXAM UNDER ANESTHESIA, REMOVAL OF ARTIFICIAL BOWEL SPHINCTER PUMP AND RESEVIOR     Surgery Location: [facility]      Surgeon:    Leonides Lawler         Surgery Date: 11/11/21  Time of Surgery: am  Where patient plans to recover: At home with family   Fax number for surgical facility: [phone]        Type of Anesthesia Anticipated: General    Assessment & Plan     Assessment and Plan:     (Z01.818) Preop general physical exam  (primary encounter diagnosis)  Comment: acceptable surgical candidate, able to do 4 METs, no new symptoms  Plan: EKG 12-lead complete w/read - Clinics, Basic         metabolic panel  (Ca, Cl, CO2, Creat, Gluc, K,         Na, BUN)        Cleared for surgery pending stable labs    (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)  Comment: also has artifical sphincter   Plan: per surgeon above    (I10) Benign essential hypertension  Comment: on lisinopril, amlodipine, atenolol,  lasix  Plan: cont above, skip lasix am of surgery    (E11.9) Type 2 diabetes, HbA1c goal < 7% (H)  Comment: BG will need to be monitored very closely blessing-operatively, follows w/endo, recently increased tresiba  Plan: see medication plan below, cut tresiba in 1/2 AM of surgery (takes either 22 or 11 units depending on AM BG)    (N18.31) Stage 3a chronic kidney disease (H)  Comment: creat 1-1.2  Plan: BMP    (C73) Papillary carcinoma of thyroid (H)  Comment: on levothyroxine, follows w/endo  Plan: cont levothyroxine    (Z86.718) History of DVT (deep vein thrombosis)  Comment: MINA, 2/2 PICC, denies any other history of  DVT  Plan:  PT post-op     (Z23) High priority for 2019-nCoV vaccine  Comment:   Plan: COVID-19,PF,PFIZER (12+ Yrs)      (D64.9) Anemia, unspecified type  Comment: stable, last hgb 12.2 today  Plan:    Risks and Recommendations:  The patient has the following additional risks and recommendations for perioperative complications:   - Consult Hospitalist / IM to assist with post-op medical management    Medication Instructions:  Stop all NSAIDs (motrin, ibuprofen, naproxen, aleve, advil, naprosyn, aspirin)  for at least 5 days prior to surgery.    Give Tresiba 10 units the am of surgery, unless AM BS<150, then only give 5 units.      Skip Sitagliptin (Januvia) the am of surgery    Skip lasix the am of surgery    Take protonix, amlodipine, levothyroxine, lisinopril, atenolol with a sip of water the morning of surgery.    Take evening medications as usual the night before surgery      RECOMMENDATION:  APPROVAL GIVEN to proceed with proposed procedure, pending stable labwork.    Maude Ortiz PA-C  45 minutes on the day of the encounter doing chart review, history and exam, documentation and further activities as noted above.      Subjective     HPI related to upcoming procedure:   Has a colostomy--recently has been having urge to have BM, surgeon suspects due to artificial sphincter which was placed prior to colostomy, upcoming procedure to have it removed.  Procedure was initially scheduled for early October but pushed back due to increased need for hospital beds due to covid.     She feels okay otherwise. 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     She does have a lot of chronic fatigue which she suspects is due to poor sleep.    She does have chronic nausea, she takes prn carefate for it.       She is able to go up a flight of stairs without chest pain or significant dyspnea.     She does PT every week     She has a  history of DVT RUE 2/2 PICC, only one, not on blood thinners.    She denies fever/chill, congestion, cough, chest pain, sob, abdominal pain, vomiting, bloody/black stool, dysuria/frequency    Preop Questions 10/27/2021   1. Have you ever had a heart attack or stroke? No   2. Have you ever had surgery on your heart or blood vessels, such as a stent placement, a coronary artery bypass, or surgery on an artery in your head, neck, heart, or legs? No   3. Do you have chest pain with activity? No   4. Do you have a history of  heart failure? No   5. Do you currently have a cold, bronchitis or symptoms of other infection? No   6. Do you have a cough, shortness of breath, or wheezing? No   7. Do you or anyone in your family have previous history of blood clots? YES - RUE DVT 2/2 PICC   8. Do you or does anyone in your family have a serious bleeding problem such as prolonged bleeding following surgeries or cuts? UNKNOWN -    9. Have you ever had problems with anemia or been told to take iron pills? YES -    10. Have you had any abnormal blood loss such as black, tarry or bloody stools, or abnormal vaginal bleeding? No   11. Have you ever had a blood transfusion? YES -    11a. Have you ever had a transfusion reaction? No   12. Are you willing to have a blood transfusion if it is medically needed before, during, or after your surgery? Yes   13. Have you or any of your relatives ever had problems with anesthesia? No   14. Do you have sleep apnea, excessive snoring or daytime drowsiness? UNKNOWN -    14a. Do you have a CPAP machine? -   15. Do you have any artifical heart valves or other implanted medical devices like a pacemaker, defibrillator, or continuous glucose monitor? No   16. Do you have artificial joints? YES - left hip   17. Are you allergic to latex? No       Health Care Directive:  Patient has a Health Care Directive on file      Review of Systems  Constitutional, neuro, ENT, endocrine, pulmonary, cardiac,  gastrointestinal, genitourinary, musculoskeletal, integument and psychiatric systems are negative, except as otherwise noted.    Patient Active Problem List    Diagnosis Date Noted     Renal artery stenosis (H) 07/31/2020     Priority: Medium     CKD (chronic kidney disease) stage 3, GFR 30-59 ml/min (H) 04/17/2019     Priority: Medium     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     Priority: Medium     Morbid obesity (H) 08/01/2018     Priority: Medium     Normocytic anemia 01/16/2017     Priority: Medium     Allergic dermatitis due to other chemical product 10/02/2016     Priority: Medium     Insufficiency, arterial, peripheral (H) 11/08/2015     Priority: Medium     Mild seen on CARMITA 11/2015 - right sided       Type 2 diabetes mellitus with other specified complication (H) 10/18/2015     Priority: Medium     Carotid stenosis 12/09/2014     Priority: Medium     Mild increased stenosis 50-69% left ICA       Right shoulder pain 12/09/2014     Priority: Medium     Left knee pain 11/03/2014     Priority: Medium     Poliomyelitis      Priority: Medium     poor circulation right leg       Diabetic gastroparesis (H)      Priority: Medium     Dermatitis 02/15/2014     Priority: Medium     Acne rosacea 02/15/2014     Priority: Medium     Esophageal reflux 01/23/2014     Priority: Medium     Had upper endoscopy - Dr. Pantoja 2013       Pulmonary nodule 09/06/2013     Priority: Medium     Alopecia 02/09/2013     Priority: Medium     Problem list name updated by automated process. Provider to review       Papillary carcinoma of thyroid (H)      Priority: Medium     s/p thyroidectomy - Ruegemer       Gastroparesis 11/12/2012     Priority: Medium     Postsurgical hypothyroidism      Priority: Medium     s/p papillary thryoid cancer - Marj  Concern for possible recurrence 03/2014       Type 2 diabetes, HbA1c goal < 7% (H)      Priority: Medium     ACP (advance care planning) 09/21/2012     Priority: Medium     Discussed advance  care planning with patient; information given to patient to review. 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 Provider to review       ANNETTE (obstructive sleep apnea)      Priority: Medium     Mixed hyperlipidemia 10/31/2010     Priority: Medium     Low back pain 07/15/2009     Priority: Medium      Past Medical History:   Diagnosis Date     Abdominal adhesions 1984, 96,99    s/p lysis     Abdominal adhesions      Acne rosacea      Allergic rhinitis      Allergic rhinitis, cause unspecified      Alopecia      Anemia      CAD (coronary artery disease)      Carotid stenosis      CKD (chronic kidney disease) stage 2, GFR 60-89 ml/min      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)      CPAP (continuous positive airway pressure) dependence      Depression      Diabetic gastroparesis (H)      Diet-controlled type 2 diabetes mellitus (H)      DM2 (diabetes mellitus, type 2) (H)      DVT (deep venous thrombosis) (H)      DVT of axillary vein, acute right (H)      Fibromyalgia      Gastro-oesophageal reflux disease      GERD (gastroesophageal reflux disease)      Hernia of unspecified site of abdominal cavity without mention of obstruction or gangrene     bilateral     History of blood transfusion     10/ 1980     HTN (hypertension)      HTN (hypertension)      Hypertriglyceridemia      Hypertriglyceridemia      Hypokalemia      Hypothyroidism      Obstructive sleep apnea      ANNETTE (obstructive sleep apnea)      ANNETTE on CPAP      Osteoarthritis of glenohumeral joint       Papillary carcinoma of thyroid (H)     s/p thyroidectomy - Ruegemer     Papillary carcinoma of thyroid (H)      PE (pulmonary embolism)      Poliomyelitis     poor circulation right leg     Poliomyelitis      Postsurgical hypothyroidism     s/p papillary thryoid cancer - Ruegemer     Pulmonary embolism (H)      Pulmonary embolus (H)      Pulmonary nodule      Rosacea      S/P carpal tunnel release     bilateral     S/P hardware removal 01/2014    still with lingering foot pain     S/P shoulder surgery     bilateral     Septic joint (H)     right knee     Septic joint of right knee joint (H)      Venous insufficiency      Venous insufficiency      Venous thrombosis 1999    right axillary vein     Past Surgical History:   Procedure Laterality Date     AMPUTATE TOE(S)  3/15/2012    Procedure:AMPUTATE TOE(S); Surgeon:RUBEN MOSES; Location: OR     AMPUTATE TOE(S)       APPENDECTOMY  1972     APPENDECTOMY       ARTHRODESIS FOOT  3/15/2012    Procedure:ARTHRODESIS FOOT; RIGHT TRIPLE ARTHRODESIS, FIFTH TOE AMPUTATION, LATERAL LIGAMENT RECONSTRUCTION, TENDON TRANSFER AND RELEASE [MINI C-ARM, ARTHREX 4.5 AND 6.7 STAPLES, BIOCOMPOSITE TENODESIS SCREWS]; Surgeon:RUBEN MOSES; Location:[facility] OR     ARTHRODESIS FOOT Right     Right foot triple arthrodesis and removal of hardware     ARTHROSCOPY SHOULDER  06/25/2015    REVISION SUBACROMIAL DECOMPRESSION, EXCISION OF GANGLION CYST, DEBRIDEMENT AND EXCISION OF THE GLENOHUMERAL JOINT GANGLION CYST, CORACOID DECOMPRESSION, POSSIBLE SUBSCAPULARIS REPAIR AND OPEN SUBSCAPULARIS BICEP     ARTHROSCOPY SHOULDER ROTATOR CUFF REPAIR       BIOPSY  Thyroid 2002     BREAST SURGERY  Biopsy     C FREEING BOWEL ADHESION,ENTEROLYSIS      1986, 1996, 1999     C TOTAL ABDOM HYSTERECTOMY  1980    + BSO     CHOLECYSTECTOMY       CHOLECYSTECTOMY       COLONOSCOPY  2018     COLOSTOMY  2/7/2012    Procedure:COLOSTOMY; CREATION OF SIGMOID COLOSTOMY AND EXTENSIVE  LYSIS OF  ADHESIONS; Surgeon:MONTSERRAT BENDER; Location:[facility] OR     COLOSTOMY       EYE SURGERY       GENITOURINARY SURGERY  1999     GI SURGERY      weakened rectal sphincter with artificial stimulator     HERNIA REPAIR  1976     HYSTERECTOMY TOTAL ABDOMINAL       LAPAROTOMY, LYSIS ADHESIONS, COMBINED  2/7/2012    Procedure:COMBINED LAPAROTOMY, LYSIS ADHESIONS; Surgeon:MONTSERRAT BENDER; Location:[facility] OR     RELEASE CARPAL TUNNEL       RELEASE TENDON FOOT  3/15/2012    Procedure:RELEASE TENDON FOOT; Surgeon:SUKHDEEP METZ; Location:[facility] OR     REMOVE HARDWARE FOOT  12/13/2012    Procedure: REMOVE HARDWARE FOOT;  RIGHT FOOT REMOVAL OF HARDWARE;  Surgeon: Sukhdeep Metz MD;  Location: [facility] OR     SHOULDER SURGERY  11/12/2020    LEFT SHOULDER HEMIARHTROPLASTY, BICEP TENODESIS     SOFT TISSUE SURGERY  2018, 2020     TENDON RELEASE      foot     THYROIDECTOMY       ZZC NONSPECIFIC PROCEDURE      throidectomy     Current Outpatient Medications   Medication Sig Dispense Refill     albuterol (PROAIR HFA/PROVENTIL HFA/VENTOLIN HFA) 108 (90 Base) MCG/ACT inhaler Inhale 2-4 puffs into the lungs every 2 hours as needed for shortness of breath / dyspnea 1 Inhaler 1     amLODIPine (NORVASC) 2.5 MG tablet Take 5 mg by mouth 2 times daily Take 2.5mg in AM, 2.5mg at noon, and 5mg at bedtime       aspirin (SB LOW DOSE ASA EC) 81 MG EC tablet Take 81 mg by mouth daily       atenolol (TENORMIN) 50 MG tablet Take 1 tablet (50 mg) by mouth 2 times daily 180 tablet 3     azelastine (ASTEPRO) 0.15 % nasal spray 1 spray       Cholecalciferol (VITAMIN D-3 PO) Take 2 tablets by mouth       clotrimazole (LOTRIMIN) 1 % cream Apply topically daily       Continuous Blood Gluc Sensor (FREESTYLE BRANDY 14 DAY SENSOR) [facility] Apply 1 sensor and change every 14 days. 2 each 11     diphenhydrAMINE-acetaminophen (TYLENOL PM)  MG tablet Take 1 tablet by mouth At Bedtime Reported on 3/20/2017       ezetimibe (ZETIA) 10 MG tablet Take 1 tablet  (10 mg) by mouth daily 90 tablet 3     famotidine (PEPCID) 20 MG tablet Take 2 tablets (40 mg) by mouth as needed 180 tablet 3     fenofibrate (TRICOR) 145 MG tablet Take 1 tablet (145 mg) by mouth daily 90 tablet 3     ferrous sulfate (IRON) 325 (65 FE) MG tablet Take 325 mg by mouth daily (with breakfast)       fexofenadine (ALLEGRA) 180 MG tablet Take 180 mg by mouth daily. 120 0     fluocinolone acetonide (DERMA SMOOTHE/FS BODY) 0.01 % external oil Apply 2 mL to scalp once per week. Massage into scalp. Can be left in overnight or washed out after 4-6 hours. 118.28 mL 5     fluticasone (FLONASE) 50 MCG/ACT spray Spray 2 sprays in nostril daily 2 sprays in each nostril qd 1 Bottle 0     furosemide (LASIX) 20 MG tablet Take 1 tablet (20 mg) by mouth daily 90 tablet 1     gabapentin (NEURONTIN) 100 MG capsule Take 1 capsule (100 mg) by mouth every evening as needed 90 capsule 3     levothyroxine (SYNTHROID) 112 MCG tablet Take 112 mcg by mouth daily Take 112 mcg daily except for Friday takes only 56 mcg.  Brand name Synthroid       lisinopril (ZESTRIL) 40 MG tablet TAKE ONE TABLET BY MOUTH ONCE DAILY 90 tablet 3     metoclopramide (REGLAN) 5 MG tablet Take 1-2 tablets (5-10 mg) by mouth 2 times daily as needed a 120 tablet 0     MULTIVITAMINS OR TABS ONE DAILY 100 3     nitroGLYcerin (NITROSTAT) 0.4 MG sublingual tablet Place 1 tablet (0.4 mg) under the tongue every 5 minutes as needed for chest pain (no more than 3 in one hour; after 3rd, call 911.) 25 tablet 3     nystatin (MYCOSTATIN) cream Apply topically daily as needed        nystatin-triamcinolone (MYCOLOG II) cream Apply topically daily as needed        ondansetron (ZOFRAN) 4 MG tablet Take 1 tablet by mouth every 6 hours as needed Reported on 3/20/2017       order for DME Equipment being ordered: Compression stockings - Knee High; 20-30 mmHg compression - note would like adhesive band to keep the stocking from sliding down 3 each 0     order for DME  Equipment being ordered: Oral appliance for sleep apnea 1 Units 0     pantoprazole (PROTONIX) 40 MG EC tablet Take 40 mg by mouth 2 times daily       sitagliptin (JANUVIA) 50 MG tablet Take 1 tablet (50 mg) by mouth daily 90 tablet 3     TRESIBA FLEXTOUCH 100 UNIT/ML pen Inject 22 Units Subcutaneous every morning 5 mL 11     tretinoin (RETIN-A) 0.025 % external cream Use every night as tolerated - spot treat lesion 20 g 3       Allergies   Allergen Reactions     Nsaids Difficulty breathing     Increased creatinine     Toradol Difficulty breathing     Shortness of breath     Celecoxib Itching and Rash     Codeine Itching     With higher doses     Crestor [Rosuvastatin] Muscle Pain (Myalgia)     No Clinical Screening - See Comments Itching     Fragrance     Vioxx Other (See Comments)     Heart races     Conjugated Estrogens Rash     Sulfa Drugs Rash        Social History     Tobacco Use     Smoking status: Never Smoker     Smokeless tobacco: Never Used   Substance Use Topics     Alcohol use: Not Currently     Alcohol/week: 0.0 standard drinks     Family History   Problem Relation Age of Onset     Arthritis Mother      Hypertension Mother      Cerebrovascular Disease Mother      Obesity Mother      Heart Disease Mother         MI's     Hypertension Father      Respiratory Father         Adult RDS     Diabetes Father         adult     Arthritis Sister      Cancer Sister      Diabetes Sister      Hypertension Sister      Breast Cancer Sister      Depression Sister      Thyroid Disease Sister      Obesity Sister      Arthritis Sister      Hypertension Sister      Thyroid Disease Sister      Cancer Sister         colon polup     Heart Disease Brother         MI at 54     Other Cancer Brother      Hypertension Sister      Osteoporosis Sister      Obesity Sister      Lipids Brother      Hypertension Brother      Diabetes Brother      Hyperlipidemia Brother      Lipids Sister      Obesity Sister      Obesity Maternal  Grandmother      Skin Cancer Maternal Grandmother         skin cancer unknown     Cancer Maternal Grandmother         unknown skin cancer on face     Obesity Paternal Grandmother      History   Drug Use No         Objective   BP (!) 140/65 (BP Location: Right arm, Patient Position: Chair, Cuff Size: Adult Large)   Pulse 62   Temp 97.1  F (36.2  C) (Tympanic)   Resp 16   Ht 1.524 m (5')   Wt 89.8 kg (198 lb)   SpO2 99%   Breastfeeding No   BMI 38.67 kg/m      Physical Exam      GENERAL APPEARANCE: healthy, alert and no distress     EYES: no scleral icterus     HENT: OP clear mouth without ulcers or lesions     NECK: supple, no bruits     RESP: lungs clear to auscultation - no rales, rhonchi or wheezes     CV: regular rates and rhythm, faint systolic murmur     ABDOMEN:  soft, nontender, no HSM or masses and bowel sounds normal, colostomy pouch site c/d/i     MS: extremities normal- brace RLE, mild pitting edema bilat lower extremities     NEURO: Normal mentation, gait and speechnormal     PSYCH: mentation appears normal. and affect normal/bright      Recent Labs   Lab Test 10/07/21  1400 09/27/21  1249 09/24/21  1112 08/02/21  1511 08/02/21  1510   HGB  --  11.6*  --  12.1  --    PLT  --  199  --  214  --    NA  --  138 138  --    < >   POTASSIUM  --  5.1 4.8  --    < >   CR  --  1.10* 1.24*  --    < >   A1C 9.5*  --   --  9.0*  --     < > = values in this interval not displayed.        Diagnostics:  EKG: sinus bradycardia, rate 53, no ischemia  Labs: pending    Revised Cardiac Risk Index (RCRI):  The patient has the following serious cardiovascular risks for perioperative complications:   - Diabetes Mellitus (on Insulin) = 1 point     RCRI Interpretation: 1 point: Class II (low risk - 0.9% complication rate)    Signed Electronically by: Maude Ortiz PA-C  Copy of this evaluation report is provided to requesting physician.

## 2021-10-29 ENCOUNTER — HOSPITAL ENCOUNTER (OUTPATIENT)
Dept: CARDIOLOGY | Facility: CLINIC | Age: 76
Discharge: HOME | End: 2021-10-29
Attending: INTERNAL MEDICINE | Admitting: INTERNAL MEDICINE
Payer: MEDICARE

## 2021-10-29 DIAGNOSIS — I10 BENIGN ESSENTIAL HYPERTENSION: ICD-10-CM

## 2021-10-29 LAB — LVEF ECHO: NORMAL

## 2021-10-29 PROCEDURE — 93306 TTE W/DOPPLER COMPLETE: CPT | Mod: 26 | Performed by: INTERNAL MEDICINE

## 2021-10-29 PROCEDURE — 93306 TTE W/DOPPLER COMPLETE: CPT

## 2021-11-04 ENCOUNTER — TELEPHONE (OUTPATIENT)
Dept: ENDOCRINOLOGY | Facility: CLINIC | Age: 76
End: 2021-11-04

## 2021-11-04 NOTE — TELEPHONE ENCOUNTER
Called patient and left a voicemail about needing to reschedule future appointment for Dr. Weston on 12/29. Told her to call [phone] to reschedule.     Anitha Orellana MA

## 2021-11-11 ENCOUNTER — TRANSFERRED RECORDS (OUTPATIENT)
Dept: HEALTH INFORMATION MANAGEMENT | Facility: CLINIC | Age: 76
End: 2021-11-11
Payer: MEDICARE

## 2021-11-15 ENCOUNTER — TELEPHONE (OUTPATIENT)
Dept: FAMILY MEDICINE | Facility: CLINIC | Age: 76
End: 2021-11-15
Payer: MEDICARE

## 2021-11-15 DIAGNOSIS — I10 ESSENTIAL HYPERTENSION: ICD-10-CM

## 2021-11-15 DIAGNOSIS — K21.9 GASTROESOPHAGEAL REFLUX DISEASE, UNSPECIFIED WHETHER ESOPHAGITIS PRESENT: Primary | ICD-10-CM

## 2021-11-15 NOTE — TELEPHONE ENCOUNTER
FYI: patient requesting delay of care for homecare until Wednesday 11/17/2021.     [facility]  Patient requesting delayed start of care until 11/17/2021  Writer gave verbal approval routing to PCP as FYI    Callback: [phone]- okay to leave detailed VM.     Tunde Rodriguez RN  [facility]

## 2021-11-16 RX ORDER — LISINOPRIL 40 MG/1
TABLET ORAL
Qty: 90 TABLET | Refills: 3 | Status: SHIPPED | OUTPATIENT
Start: 2021-11-16 | End: 2022-10-12

## 2021-11-16 RX ORDER — FAMOTIDINE 40 MG/1
TABLET, FILM COATED ORAL
Qty: 90 TABLET | Refills: 1 | Status: SHIPPED | OUTPATIENT
Start: 2021-11-16 | End: 2021-12-07

## 2021-11-16 NOTE — TELEPHONE ENCOUNTER
Routing refill request to provider for review/approval because:  Bp out of range, but has appt next month  BP Readings from Last 6 Encounters:   10/28/21 (!) 140/65   09/29/21 (!) 158/69   09/27/21 (!) 148/57   09/07/21 (!) 150/76   08/26/21 (!) 177/74   08/21/21 (!) 140/73       Darya Colindres RN

## 2021-11-17 ENCOUNTER — MEDICAL CORRESPONDENCE (OUTPATIENT)
Dept: HEALTH INFORMATION MANAGEMENT | Facility: CLINIC | Age: 76
End: 2021-11-17
Payer: MEDICARE

## 2021-11-18 ENCOUNTER — TELEPHONE (OUTPATIENT)
Dept: FAMILY MEDICINE | Facility: CLINIC | Age: 76
End: 2021-11-18
Payer: MEDICARE

## 2021-11-18 NOTE — TELEPHONE ENCOUNTER
Melody from [facility] called requesting approval for SN visit order 1 x a wk for 9 wks and PT/ OT evaluation visit.     Verbal approval given.

## 2021-11-19 ENCOUNTER — MEDICAL CORRESPONDENCE (OUTPATIENT)
Dept: HEALTH INFORMATION MANAGEMENT | Facility: CLINIC | Age: 76
End: 2021-11-19
Payer: MEDICARE

## 2021-11-22 ENCOUNTER — TRANSFERRED RECORDS (OUTPATIENT)
Dept: MULTI SPECIALTY CLINIC | Facility: CLINIC | Age: 76
End: 2021-11-22
Payer: MEDICARE

## 2021-11-22 LAB
RETINOPATHY: NEGATIVE
RETINOPATHY: NORMAL

## 2021-11-23 ENCOUNTER — TELEPHONE (OUTPATIENT)
Dept: FAMILY MEDICINE | Facility: CLINIC | Age: 76
End: 2021-11-23
Payer: MEDICARE

## 2021-11-23 NOTE — TELEPHONE ENCOUNTER
Please abstract the following data from this visit with this patient into the appropriate field in Epic:    Tests that can be patient reported without a hard copy:    Eye exam with ophthalmology on this date: 11/22/2021 at [facility]

## 2021-12-01 ENCOUNTER — TELEPHONE (OUTPATIENT)
Dept: FAMILY MEDICINE | Facility: CLINIC | Age: 76
End: 2021-12-01
Payer: MEDICARE

## 2021-12-01 ENCOUNTER — TRANSFERRED RECORDS (OUTPATIENT)
Dept: HEALTH INFORMATION MANAGEMENT | Facility: CLINIC | Age: 76
End: 2021-12-01
Payer: MEDICARE

## 2021-12-01 DIAGNOSIS — R30.0 DYSURIA: Primary | ICD-10-CM

## 2021-12-01 NOTE — TELEPHONE ENCOUNTER
Melody Shaw from [facility] calling in to request an order for a UA.     Reports pt has been having burning with urination x 2 days. No other symptoms reported    Will you order a UA for home care to collect?    Please call Melody with orders if they are approved        Can we leave a detailed message on this number? YES  Phone number patient can be reached at: Other phone number:  Melody [phone]    Yanelis Cooper, RN  [facility] Triage

## 2021-12-03 ENCOUNTER — LAB REQUISITION (OUTPATIENT)
Dept: LAB | Facility: CLINIC | Age: 76
End: 2021-12-03
Payer: MEDICARE

## 2021-12-03 ENCOUNTER — TELEPHONE (OUTPATIENT)
Dept: FAMILY MEDICINE | Facility: CLINIC | Age: 76
End: 2021-12-03
Payer: MEDICARE

## 2021-12-03 DIAGNOSIS — L03.116 LEFT LEG CELLULITIS: ICD-10-CM

## 2021-12-03 DIAGNOSIS — N39.0 URINARY TRACT INFECTION, SITE NOT SPECIFIED: ICD-10-CM

## 2021-12-03 LAB
ALBUMIN UR-MCNC: NEGATIVE MG/DL
APPEARANCE UR: CLEAR
BILIRUB UR QL STRIP: NEGATIVE
COLOR UR AUTO: ABNORMAL
GLUCOSE UR STRIP-MCNC: >=1000 MG/DL
HGB UR QL STRIP: NEGATIVE
KETONES UR STRIP-MCNC: NEGATIVE MG/DL
LEUKOCYTE ESTERASE UR QL STRIP: ABNORMAL
MUCOUS THREADS #/AREA URNS LPF: PRESENT /LPF
NITRATE UR QL: NEGATIVE
PH UR STRIP: 5.5 [PH] (ref 5–7)
RBC URINE: 1 /HPF
SP GR UR STRIP: 1.02 (ref 1–1.03)
SQUAMOUS EPITHELIAL: <1 /HPF
UROBILINOGEN UR STRIP-MCNC: NORMAL MG/DL
WBC URINE: 4 /HPF

## 2021-12-03 PROCEDURE — 81001 URINALYSIS AUTO W/SCOPE: CPT | Mod: ORL | Performed by: INTERNAL MEDICINE

## 2021-12-03 RX ORDER — CEPHALEXIN 500 MG/1
500 CAPSULE ORAL 2 TIMES DAILY
Qty: 40 CAPSULE | Refills: 0 | Status: SHIPPED | OUTPATIENT
Start: 2021-12-03 | End: 2021-12-03

## 2021-12-03 RX ORDER — CEPHALEXIN 500 MG/1
500 CAPSULE ORAL 4 TIMES DAILY
Qty: 40 CAPSULE | Refills: 0 | Status: SHIPPED | OUTPATIENT
Start: 2021-12-03 | End: 2022-02-23

## 2021-12-03 NOTE — TELEPHONE ENCOUNTER
Home care nurse called to report spot on left lower leg, approx 7 cm length and 4 cm width.  No depth, but a few small scabs, with water blister, warm to touch and tender. Light red in color.  Patient is diabetic    Possible cellulitis.  Patient is scheduled for office visit on 12/7/2021.  Do you want to give orders in advance?    Lori Bean RN

## 2021-12-03 NOTE — TELEPHONE ENCOUNTER
I called Chasity Hodgson and she notes area in which she had previous cellulitis this past summer is swollen and warm to touch.  Notes redness upon awaking and wears compression stockings usually.   She did see Dr. Lawler on Wednesday and was advised to start Lasix.      She notes that the cellulitis is tracking up her leg a bit and would like to start antibiotics prior to her appointment.  I agreed this is a good opportunity to start antibiotics we will send in restriction for Keflex 500 mg 4 times a day for 10 days

## 2021-12-07 ENCOUNTER — OFFICE VISIT (OUTPATIENT)
Dept: FAMILY MEDICINE | Facility: CLINIC | Age: 76
End: 2021-12-07
Payer: MEDICARE

## 2021-12-07 DIAGNOSIS — L30.9 DERMATITIS: ICD-10-CM

## 2021-12-07 DIAGNOSIS — I10 BENIGN ESSENTIAL HYPERTENSION: ICD-10-CM

## 2021-12-07 DIAGNOSIS — N18.30 STAGE 3 CHRONIC KIDNEY DISEASE, UNSPECIFIED WHETHER STAGE 3A OR 3B CKD (H): ICD-10-CM

## 2021-12-07 DIAGNOSIS — Z79.4 TYPE 2 DIABETES MELLITUS WITH OTHER SPECIFIED COMPLICATION, WITH LONG-TERM CURRENT USE OF INSULIN (H): Primary | ICD-10-CM

## 2021-12-07 DIAGNOSIS — E11.69 TYPE 2 DIABETES MELLITUS WITH OTHER SPECIFIED COMPLICATION, WITH LONG-TERM CURRENT USE OF INSULIN (H): Primary | ICD-10-CM

## 2021-12-07 PROCEDURE — 99214 OFFICE O/P EST MOD 30 MIN: CPT | Performed by: INTERNAL MEDICINE

## 2021-12-07 RX ORDER — AMLODIPINE BESYLATE 2.5 MG/1
TABLET ORAL
Qty: 360 TABLET | Refills: 3 | Status: SHIPPED | OUTPATIENT
Start: 2021-12-07 | End: 2022-10-12

## 2021-12-07 ASSESSMENT — MIFFLIN-ST. JEOR: SCORE: 1323.69

## 2021-12-07 NOTE — PROGRESS NOTES
Raheem Cochran is a 76 year old who presents for the following health issues     HPI     Chief Complaint   Patient presents with     Follow Up     BP       Hypertension   Chasity Hodgson presents to the clinic today for follow up of her hypertension.  She has done well with current dose of amlodipine 2.5 mg twice per day, atenolol 50 mg twice per day, and lisinopril 40 mg daily.  She is also taking furosemide 20 mg daily.  She has slight edema in the left lower extremity.   Cellulitis   She did notice redness and blistering of her right shin last week and was treated with keflex which has helped reduce the redness and warmth.  She has not had any side effects.  She is elevating her legs to help as well.      Review of Systems   Constitutional, HEENT, cardiovascular, pulmonary, GI, , musculoskeletal - right ankle afo, neuro, skin, endocrine and psych systems are negative, except as otherwise noted.      Objective    /88   Pulse 62   Temp 97.3  F (36.3  C) (Temporal)   Resp 16   Ht 1.524 m (5')   Wt 90.7 kg (200 lb)   SpO2 98%   BMI 39.06 kg/m    Body mass index is 39.06 kg/m .  Physical Exam   GENERAL: healthy, alert and no distress  EYES: Eyes grossly normal to inspection,   NECK: no adenopathy, no asymmetry, masses   RESP: lungs clear to auscultation -   CV: regular rate and rhythm,   ABDOMEN: soft, nontender, no hepatosplenomegal   MS: + compression stockings, + bilateral lower extremity   SKIN: slight erythema in the right lower extremity, trace edema   NEURO: Normal strength and tone,    PSYCH: mentation appears normal, affect normal/bright    Recent Results (from the past 240 hour(s))   UA with Microscopic reflex to Culture    Collection Time: 12/03/21  2:50 PM    Specimen: Urine, NOS   Result Value Ref Range    Color Urine Light Yellow Colorless, Straw, Light Yellow, Yellow    Appearance Urine Clear Clear    Glucose Urine >=1000 (A) Negative mg/dL    Bilirubin Urine Negative Negative     Ketones Urine Negative Negative mg/dL    Specific Gravity Urine 1.023 1.003 - 1.035    Blood Urine Negative Negative    pH Urine 5.5 5.0 - 7.0    Protein Albumin Urine Negative Negative mg/dL    Urobilinogen Urine Normal Normal, 2.0 mg/dL    Nitrite Urine Negative Negative    Leukocyte Esterase Urine Trace (A) Negative    Mucus Urine Present (A) None Seen /LPF    RBC Urine 1 <=2 /HPF    WBC Urine 4 <=5 /HPF    Squamous Epithelials Urine <1 <=1 /HPF         Patient Instructions   (E11.69,  Z79.4) Type 2 diabetes mellitus with other specified complication, with long-term current use of insulin (H)  (primary encounter diagnosis)  Comment: labs reviewed and stable, but elevated hemoglobin A1c in October.  Due to follow up in endocrinology January.  Now back on Januvia and Tresiba  Plan:     (L30.9) Dermatitis  Comment: Continue on keflex and continue to keep legs elevated  Plan:     (N18.30) Stage 3 chronic kidney disease, unspecified whether stage 3a or 3b CKD (H)  Comment: labs rechecked this past fall - due for follow up next year  Plan:     (I10) Benign essential hypertension  Comment: blood pressure is a bit elevated.  We will recheck before you leave.  Discussed that amlodipine can cause lower extremity edema and this could be reduced if swelling becomes more of an issue  Plan: amLODIPine (NORVASC) 2.5 MG tablet

## 2021-12-07 NOTE — PATIENT INSTRUCTIONS
(E11.69,  Z79.4) Type 2 diabetes mellitus with other specified complication, with long-term current use of insulin (H)  (primary encounter diagnosis)  Comment: labs reviewed and stable, but elevated hemoglobin A1c in October.  Due to follow up in endocrinology January.  Now back on Januvia and Tresiba  Plan:     (L30.9) Dermatitis  Comment: Continue on keflex and continue to keep legs elevated  Plan:     (N18.30) Stage 3 chronic kidney disease, unspecified whether stage 3a or 3b CKD (H)  Comment: labs rechecked this past fall - due for follow up next year  Plan:     (I10) Benign essential hypertension  Comment: blood pressure is a bit elevated.  We will recheck before you leave.  Discussed that amlodipine can cause lower extremity edema and this could be reduced if swelling becomes more of an issue  Plan: amLODIPine (NORVASC) 2.5 MG tablet

## 2021-12-09 VITALS
HEIGHT: 60 IN | RESPIRATION RATE: 16 BRPM | BODY MASS INDEX: 39.27 KG/M2 | DIASTOLIC BLOOD PRESSURE: 88 MMHG | WEIGHT: 200 LBS | TEMPERATURE: 97.3 F | SYSTOLIC BLOOD PRESSURE: 132 MMHG | HEART RATE: 62 BPM | OXYGEN SATURATION: 98 %

## 2021-12-15 NOTE — TELEPHONE ENCOUNTER
"FYI PCP      [facility]   [phone] from [facility] called to discontinue homecar.e     Per [facility] \"  Pt reports homecare and being homebound is ruining her life and ruining [place].\"  So gave homecare the ok to discontinue homecare, as if pt isn't homebound anyway and wants to go shopping, then homecare not appropriate.    Darya Colindres, RN  [facility] RN Triage Team    "

## 2021-12-17 ENCOUNTER — MEDICAL CORRESPONDENCE (OUTPATIENT)
Dept: HEALTH INFORMATION MANAGEMENT | Facility: CLINIC | Age: 76
End: 2021-12-17
Payer: MEDICARE

## 2021-12-21 ENCOUNTER — MEDICAL CORRESPONDENCE (OUTPATIENT)
Dept: HEALTH INFORMATION MANAGEMENT | Facility: CLINIC | Age: 76
End: 2021-12-21
Payer: MEDICARE

## 2021-12-31 DIAGNOSIS — Z53.9 DIAGNOSIS NOT YET DEFINED: Primary | ICD-10-CM

## 2021-12-31 PROCEDURE — G0180 MD CERTIFICATION HHA PATIENT: HCPCS | Performed by: INTERNAL MEDICINE

## 2022-01-03 DIAGNOSIS — I10 ESSENTIAL HYPERTENSION: ICD-10-CM

## 2022-01-03 DIAGNOSIS — Z13.6 CARDIOVASCULAR SCREENING; LDL GOAL LESS THAN 130: ICD-10-CM

## 2022-01-03 DIAGNOSIS — E78.2 MIXED HYPERLIPIDEMIA: ICD-10-CM

## 2022-01-04 ENCOUNTER — TELEPHONE (OUTPATIENT)
Dept: ENDOCRINOLOGY | Facility: CLINIC | Age: 77
End: 2022-01-04
Payer: MEDICARE

## 2022-01-04 ASSESSMENT — ENCOUNTER SYMPTOMS
MUSCLE WEAKNESS: 1
FATIGUE: 1
EYE PAIN: 1
PANIC: 0
POSTURAL DYSPNEA: 1
MYALGIAS: 1
ABDOMINAL PAIN: 1
SYNCOPE: 0
DOUBLE VISION: 0
SINUS PAIN: 0
MEMORY LOSS: 0
COUGH: 1
SHORTNESS OF BREATH: 1
DIZZINESS: 1
SNORES LOUDLY: 0
EYE REDNESS: 0
LIGHT-HEADEDNESS: 1
SKIN CHANGES: 0
PARALYSIS: 0
DECREASED APPETITE: 0
SLEEP DISTURBANCES DUE TO BREATHING: 0
SWOLLEN GLANDS: 1
DIFFICULTY URINATING: 0
WEIGHT LOSS: 0
WEAKNESS: 1
COUGH DISTURBING SLEEP: 0
HOARSE VOICE: 0
LOSS OF CONSCIOUSNESS: 0
FEVER: 0
POLYDIPSIA: 0
FLANK PAIN: 1
HEADACHES: 1
DEPRESSION: 0
JAUNDICE: 0
ORTHOPNEA: 1
NECK MASS: 1
STIFFNESS: 1
POOR WOUND HEALING: 0
BACK PAIN: 1
SMELL DISTURBANCE: 0
ALTERED TEMPERATURE REGULATION: 1
VOMITING: 0
CONSTIPATION: 0
NUMBNESS: 0
JOINT SWELLING: 0
BREAST MASS: 0
TREMORS: 0
BOWEL INCONTINENCE: 0
SPEECH CHANGE: 0
INSOMNIA: 1
HEMOPTYSIS: 0
EYE WATERING: 0
PALPITATIONS: 0
NIGHT SWEATS: 1
DYSPNEA ON EXERTION: 0
HALLUCINATIONS: 0
MUSCLE CRAMPS: 0
DECREASED CONCENTRATION: 0
INCREASED ENERGY: 1
NAUSEA: 1
BRUISES/BLEEDS EASILY: 0
RECTAL PAIN: 0
HEMATURIA: 0
HYPERTENSION: 1
BLOATING: 1
DISTURBANCES IN COORDINATION: 0
EXERCISE INTOLERANCE: 0
TINGLING: 0
LEG PAIN: 1
SINUS CONGESTION: 0
ARTHRALGIAS: 1
POLYPHAGIA: 0
DYSURIA: 0
HYPOTENSION: 0
HEARTBURN: 1
CHILLS: 1
SPUTUM PRODUCTION: 0
DIARRHEA: 0
TASTE DISTURBANCE: 0
NERVOUS/ANXIOUS: 0
TROUBLE SWALLOWING: 1
BREAST PAIN: 1
NAIL CHANGES: 1
NECK PAIN: 1
WHEEZING: 0
WEIGHT GAIN: 1
SEIZURES: 0
SORE THROAT: 1
EYE IRRITATION: 1
BLOOD IN STOOL: 0

## 2022-01-04 NOTE — TELEPHONE ENCOUNTER
Called patient and left a voicemail letting her know that her 1/5 appointment with Dr. Weston will be a virtual visit instead of in-person as Dr. Weston will not be in clinic. Advised patient to call us back at [phone] if a virtual appointment would not work and we would help her reschedule.     Jacqueline Laird MA

## 2022-01-05 ENCOUNTER — VIRTUAL VISIT (OUTPATIENT)
Dept: ENDOCRINOLOGY | Facility: CLINIC | Age: 77
End: 2022-01-05
Payer: MEDICARE

## 2022-01-05 ENCOUNTER — TELEPHONE (OUTPATIENT)
Dept: ENDOCRINOLOGY | Facility: CLINIC | Age: 77
End: 2022-01-05

## 2022-01-05 VITALS — HEIGHT: 60 IN | BODY MASS INDEX: 39.66 KG/M2 | WEIGHT: 202 LBS

## 2022-01-05 DIAGNOSIS — E11.9 TYPE 2 DIABETES, HBA1C GOAL < 7% (H): Primary | ICD-10-CM

## 2022-01-05 DIAGNOSIS — E78.2 MIXED HYPERLIPIDEMIA: ICD-10-CM

## 2022-01-05 PROCEDURE — 99214 OFFICE O/P EST MOD 30 MIN: CPT | Mod: 95 | Performed by: INTERNAL MEDICINE

## 2022-01-05 RX ORDER — EZETIMIBE 10 MG/1
TABLET ORAL
Qty: 90 TABLET | Refills: 3 | Status: SHIPPED | OUTPATIENT
Start: 2022-01-05 | End: 2022-10-12

## 2022-01-05 RX ORDER — FENOFIBRATE 145 MG/1
TABLET, COATED ORAL
Qty: 90 TABLET | Refills: 3 | Status: SHIPPED | OUTPATIENT
Start: 2022-01-05 | End: 2022-10-12

## 2022-01-05 RX ORDER — ATENOLOL 50 MG/1
TABLET ORAL
Qty: 180 TABLET | Refills: 2 | Status: SHIPPED | OUTPATIENT
Start: 2022-01-05 | End: 2022-10-10

## 2022-01-05 RX ORDER — INSULIN GLARGINE 300 U/ML
22 INJECTION, SOLUTION SUBCUTANEOUS AT BEDTIME
Qty: 15 ML | Refills: 3 | Status: SHIPPED | OUTPATIENT
Start: 2022-01-05 | End: 2022-03-31

## 2022-01-05 ASSESSMENT — MIFFLIN-ST. JEOR: SCORE: 1332.77

## 2022-01-05 NOTE — NURSING NOTE
Chief Complaint   Patient presents with     Follow Up       Vitals:    01/05/22 0946   Weight: 91.6 kg (202 lb)   Height: 1.524 m (5')       Body mass index is 39.45 kg/m .      Anitha Orellana MA

## 2022-01-05 NOTE — TELEPHONE ENCOUNTER
Last OV 12/7/21     Atenolol - Prescription approved per [facility] Refill Protocol.    Zetia, fenofibrate- Routing refill request to provider for review/approval because:     labs were ordered but not done. Sent a Netlog message asking pt to schedule a fasting lab visit     Floresita ROSE Triage RN  [facility]

## 2022-01-05 NOTE — LETTER
1/5/2022         RE: Chasity Hodgson  [id number] [address] [facility] [place] [facility] [address]        Dear Colleague,    Thank you for referring your patient, Chasity Hodgson, to the [facility] [place]. Please see a copy of my visit note below.      Video-Visit Details    Type of service:  Video Visit  Video Start Time: 10:06  Video End Time:10:28  Originating Location (pt. Location): [place], [place]  Distant Location (provider location):  Home  Platform used for Video Visit: Darrell Weston MD      Chasity Hodgson is a 76 year old yo female who presents today for follow-up of diabetes, PTC/post surgical hypothyrodisim,   Chief Complaint   Patient presents with     Follow Up         Subjective:    1) Diabetes Mellitus     Diabetes History:  Diagnosis: 2015, picked up on routine labs  Hospitalizations: None  Previous Regimens: Farxia (difficulty with frequent urination), Toujeo (was working well but insurance formulary changed 1/1/2021) At highest was at 48u daily, and then had profound low. At time of discontinuing, was down to 5u of toujeo. Previously on metformin.   Current Regimen: Tresiba 22u daily, Sitagliptin (Januiva) 50mg daily      has parkinson's living in LTC     BG check- daily between 9-10, occasionally again in afternoon/evening  Trends- AM fasting- 198- 256  Nov 27- 328  12/25- 275  Lowest 12/13- 175    Had surgery Nov 11, and had difficulty with hyperglycemia    Denies polys, denies hypos  Some nocturia, difficulty sleeping    Last eye exam: Nov 22, 2021- no retinopathy, has cataracts  Foot Exam: [facility], checks every 60 days   Blood pressure- Lisinopril 40mg daily, Atenolol 50mg daily  Lipids- ezetimibe 10 mg daily, fenofibrate 145mg daily  SIOBHAN last 7/7- 25.52      2) Hypothyroidism s/p thyroidectomy for PTC  Diagnosis: known 3 nodules that were being monitored, and in 2002 s/p thyroidectomy (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   Last ultrasound 10/2021- no tissue seen  last biomarkers 10/7/2021- TG <0.1, TGAb <0.4       3) Vitamin D Deficiency  - Taking 2 tab daily  - Most recent 7/7/2021- 73  - PTH 9 (9/16/2021)      Active diagnoses this visit:     Type 2 diabetes, HbA1c goal < 7% (H)  Mixed hyperlipidemia     ROS: 10 point ROS neg other than the symptoms noted above in the HPI.      Medical, surgical, social, and family histories, medications and allergies reviewed and updated.    Objective:  Physical Exam (visual exam)  VS:  no vital signs taken for video visit  CONSTITUTIONAL: healthy, alert and NAD, responding appropriately  ENT: normocephalic, no visual evidence of trauma, normal nose and oral mucosa  EYES: conjunctivae and sclerae normal, no exophthalmos or proptosis  THYROID:  no visualized nodules or goiter  LUNGS: no audible wheeze, cough or visible cyanosis, no visible retractions or increased work of breathing  EXTREMITIES: no hand tremors  NEUROLOGY: cranial nerves grossly intact with no obvious deficit.  SKIN:  no visualized skin lesions or rash, no edema visualized  PSYCH: mentation appears normal, normal judgement        Lab Results   Component Value Date/Time    TSH 0.95 10/07/2021 02:00 PM    TSH 14.89 (H) 11/27/2020 12:00 AM    T4 2.16 (H) 02/07/2014 01:04 PM           ASSESSMENT / PLAN:  No diagnosis found.      1) Diabetes Mellitus   - Glucose Control- not at goal, plan to switch to Toujeo, previously well controlled on this. She is willing to pay the 3 months out of pocket fee of $125 that she was quoted by her insurance company. Plan to transition at same dose, if unable to pick it up will increase tresiba by 20% to 27u. continue sitagliptin 50mg (renally dosed), Patient not interested in freestyle daryn at this time  - Cardiovascular Risk- continue ezetimibe, fenofibrate, myalgias with statin  (can consider restarting at lower dose at next visit)- check lipid panel now  - Ophthalmology- uptodate, instructed to return Nov 2022, no NPDR.  - Podiatry- patient instructed on routine foot care, follows with podiatry  - Renal- Uptodate, SIOBHAN now continue lisinopril  - At next visit, would consider discussion of restarting metformin.    2) Blood pressure- mildly elevated, encourage home testing. Continue lisinopril, atenolol, goal <130     3) Cardiovascular Risk-   - Continue ezetimibe, fenofibrate. Consider discussion of statin at next visit.  - Continue aspirin 81mg daily     4) Hypothyroidism, postsurgical  - TSH/FT4 at goal  - Continue LT4 112mcg     5) History of Papillary Thyroid Cacner  - U/S and TG/TGab show biochemically and structurally complete response  - Now 20 years out, recheck in 5 years.      RTC- 3 months         Orders Placed This Encounter   Procedures     Hemoglobin A1c     Albumin Random Urine Quantitative with Creat Ratio     Lipid panel reflex to direct LDL Fasting     Basic metabolic panel  (Ca, Cl, CO2, Creat, Gluc, K, Na, BUN)        Return to clinic 3 months    A total of 38 minutes were spent today 01/05/22 on this visit including chart review, history and counseling, documentation and other activities as detailed above.     Answers for HPI/ROS submitted by the patient on 1/4/2022  General Symptoms: Yes  Skin Symptoms: Yes  HENT Symptoms: Yes  EYE SYMPTOMS: Yes  HEART SYMPTOMS: Yes  LUNG SYMPTOMS: Yes  INTESTINAL SYMPTOMS: Yes  URINARY SYMPTOMS: Yes  GYNECOLOGIC SYMPTOMS: No  BREAST SYMPTOMS: Yes  SKELETAL SYMPTOMS: Yes  BLOOD SYMPTOMS: Yes  NERVOUS SYSTEM SYMPTOMS: Yes  MENTAL HEALTH SYMPTOMS: Yes  Ear pain: Yes  Ear discharge: No  Hearing loss: No  Tinnitus: Yes  Nosebleeds: No  Congestion: No  Sinus pain: No  Trouble swallowing: Yes   Voice hoarseness: No  Mouth sores: Yes  Sore throat: Yes  Tooth pain: No  Gum tenderness: Yes  Bleeding gums: No  Change in taste: No  Change in sense  of smell: No  Dry mouth: Yes  Hearing aid used: No  Neck lump: Yes  Fever: No  Loss of appetite: No  Weight loss: No  Weight gain: Yes  Fatigue: Yes  Night sweats: Yes  Chills: Yes  Increased stress: No  Excessive hunger: No  Excessive thirst: No  Feeling hot or cold when others believe the temperature is normal: Yes  Loss of height: No  Post-operative complications: No  Surgical site pain: No  Hallucinations: No  Change in or Loss of Energy: Yes  Hyperactivity: No  Confusion: No  Changes in hair: No  Changes in moles/birth marks: No  Itching: Yes  Rashes: No  Changes in nails: Yes  Acne: No  Hair in places you don't want it: Yes  Change in facial hair: No  Warts: No  Non-healing sores: No  Scarring: No  Flaking of skin: Yes  Color changes of hands/feet in cold : No  Sun sensitivity: Yes  Skin thickening: No  Eye pain: Yes  Vision loss: No  Dry eyes: Yes  Watery eyes: No  Eye bulging: No  Double vision: No  Flashing of lights: No  Spots: Yes  Floaters: Yes  Redness: No  Crossed eyes: No  Tunnel Vision: No  Yellowing of eyes: No  Eye irritation: Yes  Chest pain or pressure: Yes  Fast or irregular heartbeat: No  Pain in legs with walking: Yes  Trouble breathing while lying down: Yes  Fingers or toes appear blue: No  High blood pressure: Yes  Low blood pressure: No  Fainting: No  Murmurs: No  Pacemaker: No  Varicose veins: No  Wake up at night with shortness of breath: No  Light-headedness: Yes  Exercise intolerance: No  Cough: Yes  Sputum or phlegm: No  Coughing up blood: No  Difficulty breating or shortness of breath: Yes  Snoring: No  Wheezing: No  Difficulty breathing on exertion: No  Nighttime Cough: No  Difficulty breathing when lying flat: Yes  Heart burn or indigestion: Yes  Nausea: Yes  Vomiting: No  Abdominal pain: Yes  Bloating: Yes  Constipation: No  Diarrhea: No  Blood in stool: No  Black stools: No  Rectal or Anal pain: No  Fecal incontinence: No  Yellowing of skin or eyes: No  Vomit with blood:  No  Change in stools: Yes  Trouble holding urine or incontinence: Yes  Pain or burning: No  Trouble starting or stopping: No  Increased frequency of urination: No  Blood in urine: No  Decreased frequency of urination: No  Frequent nighttime urination: Yes  Flank pain: Yes  Difficulty emptying bladder: No  Discharge: No  Lumps: No  Pain: Yes  Nipple retraction: No  Back pain: Yes  Muscle aches: Yes  Neck pain: Yes  Swollen joints: No  Joint pain: Yes  Bone pain: No  Muscle cramps: No  Muscle weakness: Yes  Joint stiffness: Yes  Bone fracture: No  Edema or swelling: Yes  Anemia: No  Swollen glands: Yes  Easy bleeding or bruising: No  Trouble with coordination: No  Dizziness or trouble with balance: Yes  Fainting or black-out spells: No  Memory loss: No  Headache: Yes  Seizures: No  Speech problems: No  Tingling: No  Tremor: No  Weakness: Yes  Difficulty walking: Yes  Paralysis: No  Numbness: No  Nervous or Anxious: No  Depression: No  Trouble sleeping: Yes  Trouble thinking or concentrating: No  Mood changes: No  Panic attacks: No          Again, thank you for allowing me to participate in the care of your patient.        Sincerely,        Odette Weston MD

## 2022-01-05 NOTE — PROGRESS NOTES
Video-Visit Details    Type of service:  Video Visit  Video Start Time: 10:06  Video End Time:10:28  Originating Location (pt. Location): [place], [place]  Distant Location (provider location):  Home  Platform used for Video Visit: Darrell Weston MD      Chasity Hodgson is a 76 year old yo female who presents today for follow-up of diabetes, PTC/post surgical hypothyrodisim,   Chief Complaint   Patient presents with     Follow Up         Subjective:    1) Diabetes Mellitus     Diabetes History:  Diagnosis: 2015, picked up on routine labs  Hospitalizations: None  Previous Regimens: Farxia (difficulty with frequent urination), Toujeo (was working well but insurance formulary changed 1/1/2021) At highest was at 48u daily, and then had profound low. At time of discontinuing, was down to 5u of toujeo. Previously on metformin.   Current Regimen: Tresiba 22u daily, Sitagliptin (Januiva) 50mg daily      has parkinson's living in LTC     BG check- daily between 9-10, occasionally again in afternoon/evening  Trends- AM fasting- 198- 256  Nov 27- 328 12/25- 275  Lowest 12/13- 175    Had surgery Nov 11, and had difficulty with hyperglycemia    Denies polys, denies hypos  Some nocturia, difficulty sleeping    Last eye exam: Nov 22, 2021- no retinopathy, has cataracts  Foot Exam: [facility], checks every 60 days   Blood pressure- Lisinopril 40mg daily, Atenolol 50mg daily  Lipids- ezetimibe 10 mg daily, fenofibrate 145mg daily  SIOBHAN last 7/7- 25.52      2) Hypothyroidism s/p thyroidectomy for PTC  Diagnosis: known 3 nodules that were being monitored, and in 2002 s/p thyroidectomy (nodule in isthmus was cancer), s/p BUTLER (per patient, unclear if clean margins so received BUTLER, unknown dose)  Treatment: Levothyroxine 112mcg  Residual tissue on R that has been monitored, not growing.   Last ultrasound 10/2021- no tissue seen  last biomarkers 10/7/2021- TG <0.1, TGAb <0.4       3) Vitamin D Deficiency  - Taking 2  tab daily  - Most recent 7/7/2021- 74  - PTH 9 (9/16/2021)      Active diagnoses this visit:     Type 2 diabetes, HbA1c goal < 7% (H)  Mixed hyperlipidemia     ROS: 10 point ROS neg other than the symptoms noted above in the HPI.      Medical, surgical, social, and family histories, medications and allergies reviewed and updated.    Objective:  Physical Exam (visual exam)  VS:  no vital signs taken for video visit  CONSTITUTIONAL: healthy, alert and NAD, responding appropriately  ENT: normocephalic, no visual evidence of trauma, normal nose and oral mucosa  EYES: conjunctivae and sclerae normal, no exophthalmos or proptosis  THYROID:  no visualized nodules or goiter  LUNGS: no audible wheeze, cough or visible cyanosis, no visible retractions or increased work of breathing  EXTREMITIES: no hand tremors  NEUROLOGY: cranial nerves grossly intact with no obvious deficit.  SKIN:  no visualized skin lesions or rash, no edema visualized  PSYCH: mentation appears normal, normal judgement        Lab Results   Component Value Date/Time    TSH 0.95 10/07/2021 02:00 PM    TSH 14.89 (H) 11/27/2020 12:00 AM    T4 2.16 (H) 02/07/2014 01:04 PM           ASSESSMENT / PLAN:  No diagnosis found.      1) Diabetes Mellitus   - Glucose Control- not at goal, plan to switch to Toujeo, previously well controlled on this. She is willing to pay the 3 months out of pocket fee of $125 that she was quoted by her insurance company. Plan to transition at same dose, if unable to pick it up will increase tresiba by 20% to 27u. continue sitagliptin 50mg (renally dosed), Patient not interested in freestyle daryn at this time  - Cardiovascular Risk- continue ezetimibe, fenofibrate, myalgias with statin (can consider restarting at lower dose at next visit)- check lipid panel now  - Ophthalmology- uptodate, instructed to return Nov 2022, no NPDR.  - Podiatry- patient instructed on routine foot care, follows with podiatry  - Renal- Uptodate, SIOBHAN now  continue lisinopril  - At next visit, would consider discussion of restarting metformin.    2) Blood pressure- mildly elevated, encourage home testing. Continue lisinopril, atenolol, goal <130     3) Cardiovascular Risk-   - Continue ezetimibe, fenofibrate. Consider discussion of statin at next visit.  - Continue aspirin 81mg daily     4) Hypothyroidism, postsurgical  - TSH/FT4 at goal  - Continue LT4 112mcg     5) History of Papillary Thyroid Cacner  - U/S and TG/TGab show biochemically and structurally complete response  - Now 20 years out, recheck in 5 years.      RTC- 3 months         Orders Placed This Encounter   Procedures     Hemoglobin A1c     Albumin Random Urine Quantitative with Creat Ratio     Lipid panel reflex to direct LDL Fasting     Basic metabolic panel  (Ca, Cl, CO2, Creat, Gluc, K, Na, BUN)        Return to clinic 3 months    A total of 38 minutes were spent today 01/05/22 on this visit including chart review, history and counseling, documentation and other activities as detailed above.     Answers for HPI/ROS submitted by the patient on 1/4/2022  General Symptoms: Yes  Skin Symptoms: Yes  HENT Symptoms: Yes  EYE SYMPTOMS: Yes  HEART SYMPTOMS: Yes  LUNG SYMPTOMS: Yes  INTESTINAL SYMPTOMS: Yes  URINARY SYMPTOMS: Yes  GYNECOLOGIC SYMPTOMS: No  BREAST SYMPTOMS: Yes  SKELETAL SYMPTOMS: Yes  BLOOD SYMPTOMS: Yes  NERVOUS SYSTEM SYMPTOMS: Yes  MENTAL HEALTH SYMPTOMS: Yes  Ear pain: Yes  Ear discharge: No  Hearing loss: No  Tinnitus: Yes  Nosebleeds: No  Congestion: No  Sinus pain: No  Trouble swallowing: Yes   Voice hoarseness: No  Mouth sores: Yes  Sore throat: Yes  Tooth pain: No  Gum tenderness: Yes  Bleeding gums: No  Change in taste: No  Change in sense of smell: No  Dry mouth: Yes  Hearing aid used: No  Neck lump: Yes  Fever: No  Loss of appetite: No  Weight loss: No  Weight gain: Yes  Fatigue: Yes  Night sweats: Yes  Chills: Yes  Increased stress: No  Excessive hunger: No  Excessive thirst:  No  Feeling hot or cold when others believe the temperature is normal: Yes  Loss of height: No  Post-operative complications: No  Surgical site pain: No  Hallucinations: No  Change in or Loss of Energy: Yes  Hyperactivity: No  Confusion: No  Changes in hair: No  Changes in moles/birth marks: No  Itching: Yes  Rashes: No  Changes in nails: Yes  Acne: No  Hair in places you don't want it: Yes  Change in facial hair: No  Warts: No  Non-healing sores: No  Scarring: No  Flaking of skin: Yes  Color changes of hands/feet in cold : No  Sun sensitivity: Yes  Skin thickening: No  Eye pain: Yes  Vision loss: No  Dry eyes: Yes  Watery eyes: No  Eye bulging: No  Double vision: No  Flashing of lights: No  Spots: Yes  Floaters: Yes  Redness: No  Crossed eyes: No  Tunnel Vision: No  Yellowing of eyes: No  Eye irritation: Yes  Chest pain or pressure: Yes  Fast or irregular heartbeat: No  Pain in legs with walking: Yes  Trouble breathing while lying down: Yes  Fingers or toes appear blue: No  High blood pressure: Yes  Low blood pressure: No  Fainting: No  Murmurs: No  Pacemaker: No  Varicose veins: No  Wake up at night with shortness of breath: No  Light-headedness: Yes  Exercise intolerance: No  Cough: Yes  Sputum or phlegm: No  Coughing up blood: No  Difficulty breating or shortness of breath: Yes  Snoring: No  Wheezing: No  Difficulty breathing on exertion: No  Nighttime Cough: No  Difficulty breathing when lying flat: Yes  Heart burn or indigestion: Yes  Nausea: Yes  Vomiting: No  Abdominal pain: Yes  Bloating: Yes  Constipation: No  Diarrhea: No  Blood in stool: No  Black stools: No  Rectal or Anal pain: No  Fecal incontinence: No  Yellowing of skin or eyes: No  Vomit with blood: No  Change in stools: Yes  Trouble holding urine or incontinence: Yes  Pain or burning: No  Trouble starting or stopping: No  Increased frequency of urination: No  Blood in urine: No  Decreased frequency of urination: No  Frequent nighttime urination:  Yes  Flank pain: Yes  Difficulty emptying bladder: No  Discharge: No  Lumps: No  Pain: Yes  Nipple retraction: No  Back pain: Yes  Muscle aches: Yes  Neck pain: Yes  Swollen joints: No  Joint pain: Yes  Bone pain: No  Muscle cramps: No  Muscle weakness: Yes  Joint stiffness: Yes  Bone fracture: No  Edema or swelling: Yes  Anemia: No  Swollen glands: Yes  Easy bleeding or bruising: No  Trouble with coordination: No  Dizziness or trouble with balance: Yes  Fainting or black-out spells: No  Memory loss: No  Headache: Yes  Seizures: No  Speech problems: No  Tingling: No  Tremor: No  Weakness: Yes  Difficulty walking: Yes  Paralysis: No  Numbness: No  Nervous or Anxious: No  Depression: No  Trouble sleeping: Yes  Trouble thinking or concentrating: No  Mood changes: No  Panic attacks: No

## 2022-01-11 ENCOUNTER — LAB (OUTPATIENT)
Dept: LAB | Facility: CLINIC | Age: 77
End: 2022-01-11
Payer: MEDICARE

## 2022-01-11 DIAGNOSIS — E78.2 MIXED HYPERLIPIDEMIA: ICD-10-CM

## 2022-01-11 DIAGNOSIS — E11.69 TYPE 2 DIABETES MELLITUS WITH OTHER SPECIFIED COMPLICATION, WITH LONG-TERM CURRENT USE OF INSULIN (H): ICD-10-CM

## 2022-01-11 DIAGNOSIS — Z79.4 TYPE 2 DIABETES MELLITUS WITH OTHER SPECIFIED COMPLICATION, WITH LONG-TERM CURRENT USE OF INSULIN (H): ICD-10-CM

## 2022-01-11 DIAGNOSIS — D64.9 ANEMIA, UNSPECIFIED TYPE: ICD-10-CM

## 2022-01-11 DIAGNOSIS — E11.9 TYPE 2 DIABETES, HBA1C GOAL < 7% (H): ICD-10-CM

## 2022-01-11 LAB
ERYTHROCYTE [DISTWIDTH] IN BLOOD BY AUTOMATED COUNT: 11.8 % (ref 10–15)
HBA1C MFR BLD: 9.6 % (ref 0–5.6)
HCT VFR BLD AUTO: 35.6 % (ref 35–47)
HGB BLD-MCNC: 11.7 G/DL (ref 11.7–15.7)
MCH RBC QN AUTO: 29.8 PG (ref 26.5–33)
MCHC RBC AUTO-ENTMCNC: 32.9 G/DL (ref 31.5–36.5)
MCV RBC AUTO: 91 FL (ref 78–100)
PLATELET # BLD AUTO: 217 10E3/UL (ref 150–450)
RBC # BLD AUTO: 3.92 10E6/UL (ref 3.8–5.2)
WBC # BLD AUTO: 7.6 10E3/UL (ref 4–11)

## 2022-01-11 PROCEDURE — 80048 BASIC METABOLIC PNL TOTAL CA: CPT

## 2022-01-11 PROCEDURE — 36415 COLL VENOUS BLD VENIPUNCTURE: CPT

## 2022-01-11 PROCEDURE — 80061 LIPID PANEL: CPT

## 2022-01-11 PROCEDURE — 82043 UR ALBUMIN QUANTITATIVE: CPT

## 2022-01-11 PROCEDURE — 83036 HEMOGLOBIN GLYCOSYLATED A1C: CPT

## 2022-01-11 PROCEDURE — 85027 COMPLETE CBC AUTOMATED: CPT

## 2022-01-12 ENCOUNTER — MYC MEDICAL ADVICE (OUTPATIENT)
Dept: ENDOCRINOLOGY | Facility: CLINIC | Age: 77
End: 2022-01-12
Payer: MEDICARE

## 2022-01-12 LAB
ANION GAP SERPL CALCULATED.3IONS-SCNC: 5 MMOL/L (ref 3–14)
BUN SERPL-MCNC: 25 MG/DL (ref 7–30)
CALCIUM SERPL-MCNC: 9.5 MG/DL (ref 8.5–10.1)
CHLORIDE BLD-SCNC: 105 MMOL/L (ref 94–109)
CHOLEST SERPL-MCNC: 177 MG/DL
CO2 SERPL-SCNC: 26 MMOL/L (ref 20–32)
CREAT SERPL-MCNC: 1.07 MG/DL (ref 0.52–1.04)
CREAT UR-MCNC: 93 MG/DL
FASTING STATUS PATIENT QL REPORTED: YES
GFR SERPL CREATININE-BSD FRML MDRD: 54 ML/MIN/1.73M2
GLUCOSE BLD-MCNC: 209 MG/DL (ref 70–99)
HDLC SERPL-MCNC: 34 MG/DL
LDLC SERPL CALC-MCNC: 81 MG/DL
MICROALBUMIN UR-MCNC: 36 MG/L
MICROALBUMIN/CREAT UR: 38.71 MG/G CR (ref 0–25)
NONHDLC SERPL-MCNC: 143 MG/DL
POTASSIUM BLD-SCNC: 4.7 MMOL/L (ref 3.4–5.3)
SODIUM SERPL-SCNC: 136 MMOL/L (ref 133–144)
TRIGL SERPL-MCNC: 308 MG/DL

## 2022-01-12 NOTE — RESULT ENCOUNTER NOTE
Gerard Gaspar,    I had the opportunity to review your recent labs and a summary of your labs reads as follows:    Your complete blood counts show no sign of anemia, normal white blood cell count and platelets.  Your fasting lipid panel show  - low  HDL (good) cholesterol -as your goal is greater than 40  - low LDL (bad) cholesterol as your goal is less than 100  - stable elevated triglyceride levels    Congratulations on your excellent results      Sincerely,  Shola Benoit MD

## 2022-01-13 NOTE — TELEPHONE ENCOUNTER
Checking to see what you would like to do with Toujeo as PA was denied. I still have the prescription in my queue. thanks

## 2022-02-20 ENCOUNTER — HOSPITAL ENCOUNTER (EMERGENCY)
Facility: CLINIC | Age: 77
Discharge: HOME | End: 2022-02-20
Attending: NURSE PRACTITIONER | Admitting: NURSE PRACTITIONER
Payer: MEDICARE

## 2022-02-20 ENCOUNTER — NURSE TRIAGE (OUTPATIENT)
Dept: NURSING | Facility: CLINIC | Age: 77
End: 2022-02-20
Payer: MEDICARE

## 2022-02-20 VITALS
DIASTOLIC BLOOD PRESSURE: 71 MMHG | OXYGEN SATURATION: 100 % | RESPIRATION RATE: 20 BRPM | HEART RATE: 60 BPM | SYSTOLIC BLOOD PRESSURE: 179 MMHG | TEMPERATURE: 96.2 F

## 2022-02-20 DIAGNOSIS — L03.019 PARONYCHIA OF FINGER: ICD-10-CM

## 2022-02-20 DIAGNOSIS — L03.012 PARONYCHIA OF FINGER OF LEFT HAND: ICD-10-CM

## 2022-02-20 PROCEDURE — 10060 I&D ABSCESS SIMPLE/SINGLE: CPT

## 2022-02-20 PROCEDURE — 99283 EMERGENCY DEPT VISIT LOW MDM: CPT | Mod: 25

## 2022-02-20 ASSESSMENT — ENCOUNTER SYMPTOMS
NUMBNESS: 0
WOUND: 1
TREMORS: 0
ARTHRALGIAS: 1
COLOR CHANGE: 1

## 2022-02-21 NOTE — DISCHARGE INSTRUCTIONS
Warm soapy soaks 3 times a day for 15 minutes for the next 3 to 5 days. Tylenol and/or ibuprofen as needed as directed for discomfort. Follow-up with your primary care provider in 3 to 5 days if no improvement or if concerns about healing. Return to the emergency department if you develop increased pain, swelling, red streaks up your hand, or for other concerns.

## 2022-02-21 NOTE — TELEPHONE ENCOUNTER
Patient calling with concerns about;    Prior surgery ( 2003? A screw put in)in this finger left hand #2   Began with pain on or about 2/16/22 -getting progressively worse      Pain is 10/10 - throbbing  Tender/hot to touch, red, significant swelling,  Taking tylenol - still very painful  Also had left shoulder replacement in Nov.2020  Left hip replacement in May, 2020  Patient can not confirm nor deny a fever but has had chills and sweating for at least 24 hours.    According to the protocol, patient should go to ED now.  Care advice given. Patient verbalizes understanding and agrees with plan of care.     Tonya Osorio RN, Nurse Advisor 7:34 PM 2/20/2022  COVID 19 Nurse Triage Plan/Patient Instructions    Please be aware that novel coronavirus (COVID-19) may be circulating in the community. If you develop symptoms such as fever, cough, or SOB or if you have concerns about the presence of another infection including coronavirus (COVID-19), please contact your health care provider or visit [email or web address].     Disposition/Instructions    ED Visit recommended. Follow protocol based instructions.     Bring Your Own Device:  Please also bring your smart device(s) (smart phones, tablets, laptops) and their charging cables for your personal use and to communicate with your care team during your visit.    Thank you for taking steps to prevent the spread of this virus.  o Limit your contact with others.  o Wear a simple mask to cover your cough.  o Wash your hands well and often.        Reason for Disposition    [1] Swollen joint AND [2] fever    Additional Information    Negative: Followed a finger injury    Negative: Wound looks infected    Negative: Caused by an animal bite    Negative: Caused by frostbite    Negative: Caused by a human bite    Negative: Hand or wrist pain is main symptom    Protocols used: FINGER PAIN-A-AH

## 2022-02-21 NOTE — ED PROVIDER NOTES
History     Chief Complaint:  Finger pain      HPI   Chasity Hodgson is a 76 year old female who presents with left index finger pain and swelling.  She describes signs of infection on the lateral aspect of her nail.  This started a few days ago and has increased discomfort therefore she presented for evaluation this evening.  No other concerns or complaints.    Review of Systems   Musculoskeletal: Positive for arthralgias.   Skin: Positive for color change and wound.   Neurological: Negative for tremors and numbness.   All other systems reviewed and are negative.      Allergies:  Nsaids  Toradol  Celecoxib  Codeine  Crestor [Rosuvastatin]  No Clinical Screening - See Comments  Vioxx  Conjugated Estrogens  Sulfa Drugs      Medications:    albuterol (PROAIR HFA/PROVENTIL HFA/VENTOLIN HFA) 108 (90 Base) MCG/ACT inhaler  amLODIPine (NORVASC) 2.5 MG tablet  aspirin (SB LOW DOSE ASA EC) 81 MG EC tablet  atenolol (TENORMIN) 50 MG tablet  azelastine (ASTEPRO) 0.15 % nasal spray  cephALEXin (KEFLEX) 500 MG capsule  Cholecalciferol (VITAMIN D-3 PO)  clotrimazole (LOTRIMIN) 1 % cream  Continuous Blood Gluc Sensor (ANTs SoftwareYLE BRANDY 14 DAY SENSOR) MISC  diphenhydrAMINE-acetaminophen (TYLENOL PM)  MG tablet  ezetimibe (ZETIA) 10 MG tablet  famotidine (PEPCID) 20 MG tablet  fenofibrate (TRICOR) 145 MG tablet  ferrous sulfate (IRON) 325 (65 FE) MG tablet  fexofenadine (ALLEGRA) 180 MG tablet  fluocinolone acetonide (DERMA SMOOTHE/FS BODY) 0.01 % external oil  fluticasone (FLONASE) 50 MCG/ACT spray  furosemide (LASIX) 20 MG tablet  gabapentin (NEURONTIN) 100 MG capsule  insulin glargine U-300 (TOUJEO SOLOSTAR) 300 UNIT/ML (1 units dial) pen  levothyroxine (SYNTHROID) 112 MCG tablet  lisinopril (ZESTRIL) 40 MG tablet  metoclopramide (REGLAN) 5 MG tablet  MULTIVITAMINS OR TABS  nitroGLYcerin (NITROSTAT) 0.4 MG sublingual tablet  nystatin (MYCOSTATIN) cream  nystatin-triamcinolone (MYCOLOG II) cream  ondansetron (ZOFRAN) 4 MG  tablet  order for DME  order for DME  pantoprazole (PROTONIX) 40 MG EC tablet  sitagliptin (JANUVIA) 50 MG tablet  TRESIBA FLEXTOUCH 100 UNIT/ML pen  tretinoin (RETIN-A) 0.025 % external cream        Past Medical History:      Past Medical History:   Diagnosis Date     Abdominal adhesions 1984, 96,99     Abdominal adhesions      Acne rosacea      Allergic rhinitis      Allergic rhinitis, cause unspecified      Alopecia      Anemia      CAD (coronary artery disease)      Carotid stenosis      CKD (chronic kidney disease) stage 2, GFR 60-89 ml/min      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)      CPAP (continuous positive airway pressure) dependence      Depression      Diabetic gastroparesis (H)      Diet-controlled type 2 diabetes mellitus (H)      DM2 (diabetes mellitus, type 2) (H)      DVT (deep venous thrombosis) (H)      DVT of axillary vein, acute right (H)      Fibromyalgia      Gastro-oesophageal reflux disease      GERD (gastroesophageal reflux disease)      Hernia of unspecified site of abdominal cavity without mention of obstruction or gangrene      History of blood transfusion      HTN (hypertension)      HTN (hypertension)      Hypertriglyceridemia      Hypertriglyceridemia      Hypokalemia      Hypothyroidism      Obstructive sleep apnea      ANNETTE (obstructive sleep apnea)      ANNETTE on CPAP      Osteoarthritis of glenohumeral joint      Papillary carcinoma of thyroid (H)      Papillary carcinoma of thyroid (H)      PE (pulmonary embolism)      Poliomyelitis      Poliomyelitis      Postsurgical hypothyroidism      Pulmonary embolism (H)      Pulmonary embolus (H)      Pulmonary nodule      Rosacea      S/P carpal tunnel release      S/P hardware removal 01/2014     S/P shoulder surgery      Septic joint (H)      Septic joint of right knee joint (H)      Venous insufficiency      Venous insufficiency      Venous thrombosis 1999     Patient Active Problem List    Diagnosis Date Noted     Renal artery stenosis (H) 07/31/2020      Priority: Medium     CKD (chronic kidney disease) stage 3, GFR 30-59 ml/min (H) 04/17/2019     Priority: Medium     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     Priority: Medium     Morbid obesity (H) 08/01/2018     Priority: Medium     Normocytic anemia 01/16/2017     Priority: Medium     Allergic dermatitis due to other chemical product 10/02/2016     Priority: Medium     Insufficiency, arterial, peripheral (H) 11/08/2015     Priority: Medium     Mild seen on CARMITA 11/2015 - right sided       Type 2 diabetes mellitus with other specified complication (H) 10/18/2015     Priority: Medium     Carotid stenosis 12/09/2014     Priority: Medium     Mild increased stenosis 50-69% left ICA       Right shoulder pain 12/09/2014     Priority: Medium     Left knee pain 11/03/2014     Priority: Medium     Poliomyelitis      Priority: Medium     poor circulation right leg       Diabetic gastroparesis (H)      Priority: Medium     Dermatitis 02/15/2014     Priority: Medium     Acne rosacea 02/15/2014     Priority: Medium     Esophageal reflux 01/23/2014     Priority: Medium     Had upper endoscopy - Dr. Pantoja 2013       Pulmonary nodule 09/06/2013     Priority: Medium     Alopecia 02/09/2013     Priority: Medium     Problem list name updated by automated process. Provider to review       Papillary carcinoma of thyroid (H)      Priority: Medium     s/p thyroidectomy - [facility]       Gastroparesis 11/12/2012     Priority: Medium     Postsurgical hypothyroidism      Priority: Medium     [facility]  Concern for possible recurrence 03/2014       Type 2 diabetes, HbA1c goal < 7% (H)      Priority: Medium     ACP (advance care planning) 09/21/2012     Priority: Medium     Discussed advance care planning with patient; information given to patient to review. 9/21/2012 Whit BROOKS LPN           Cavovarus deformity of foot 03/19/2012     Priority: Medium     History of DVT (deep vein thrombosis) 03/19/2012      Priority: Medium     Hypokalemia 03/19/2012     Priority: Medium     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) 03/19/2012     Priority: Medium     Anemia due to blood loss, acute 03/19/2012     Priority: Medium     Benign essential hypertension      Priority: Medium     Fibromyalgia      Priority: Medium     Allergic rhinitis      Priority: Medium     Problem list name updated by automated process. Provider to review       ANNETTE (obstructive sleep apnea)      Priority: Medium     Mixed hyperlipidemia 10/31/2010     Priority: Medium     Low back pain 07/15/2009     Priority: Medium        Past Surgical History:      Past Surgical History:   Procedure Laterality Date     AMPUTATE TOE(S)  3/15/2012    Procedure:AMPUTATE TOE(S); Surgeon:RUBEN MOSES; Location: OR     AMPUTATE TOE(S)       APPENDECTOMY  1972     APPENDECTOMY       ARTHRODESIS FOOT  3/15/2012    Procedure:ARTHRODESIS FOOT; RIGHT TRIPLE ARTHRODESIS, FIFTH TOE AMPUTATION, LATERAL LIGAMENT RECONSTRUCTION, TENDON TRANSFER AND RELEASE [MINI C-ARM, ARTHREX 4.5 AND 6.7 STAPLES, BIOCOMPOSITE TENODESIS SCREWS]; Surgeon:RUBEN MOSES; Location: OR     ARTHRODESIS FOOT Right     Right foot triple arthrodesis and removal of hardware     ARTHROSCOPY SHOULDER  06/25/2015    REVISION SUBACROMIAL DECOMPRESSION, EXCISION OF GANGLION CYST, DEBRIDEMENT AND EXCISION OF THE GLENOHUMERAL JOINT GANGLION CYST, CORACOID DECOMPRESSION, POSSIBLE SUBSCAPULARIS REPAIR AND OPEN SUBSCAPULARIS BICEP     ARTHROSCOPY SHOULDER ROTATOR CUFF REPAIR       BIOPSY  Thyroid 2002     BREAST SURGERY  Biopsy     CHOLECYSTECTOMY       CHOLECYSTECTOMY       COLONOSCOPY  2018     COLOSTOMY  2/7/2012    Procedure:COLOSTOMY; CREATION OF SIGMOID COLOSTOMY AND EXTENSIVE  LYSIS OF ADHESIONS; Surgeon:MONTSERRAT BENDER; Location: OR     COLOSTOMY       EYE SURGERY       GENITOURINARY SURGERY  1999     GI SURGERY      weakened rectal sphincter with artificial stimulator     HERNIA REPAIR  1976      HYSTERECTOMY TOTAL ABDOMINAL       LAPAROTOMY, LYSIS ADHESIONS, COMBINED  2/7/2012    Procedure:COMBINED LAPAROTOMY, LYSIS ADHESIONS; Surgeon:MONTSERRAT BENDER; Location:[facility] OR     RELEASE CARPAL TUNNEL       RELEASE TENDON FOOT  3/15/2012    Procedure:RELEASE TENDON FOOT; Surgeon:SUKHDEEP METZ; Location:[facility] OR     REMOVE HARDWARE FOOT  12/13/2012    Procedure: REMOVE HARDWARE FOOT;  RIGHT FOOT REMOVAL OF HARDWARE;  Surgeon: Sukhdeep Metz MD;  Location: [facility] OR     SHOULDER SURGERY  11/12/2020    LEFT SHOULDER HEMIARHTROPLASTY, BICEP TENODESIS     SOFT TISSUE SURGERY  2018, 2020     TENDON RELEASE      foot     THYROIDECTOMY       ZZC FREEING BOWEL ADHESION,ENTEROLYSIS      1986, 1996, 1999     ZZC NONSPECIFIC PROCEDURE      throidectomy     ZZC TOTAL ABDOM HYSTERECTOMY  1980    + BSO       Family History:      Family History   Problem Relation Age of Onset     Arthritis Mother      Hypertension Mother      Cerebrovascular Disease Mother      Obesity Mother      Heart Disease Mother         MI's     Hypertension Father      Respiratory Father         Adult RDS     Diabetes Father         adult     Arthritis Sister      Cancer Sister      Diabetes Sister      Hypertension Sister      Breast Cancer Sister      Depression Sister      Thyroid Disease Sister      Obesity Sister      Arthritis Sister      Hypertension Sister      Thyroid Disease Sister      Cancer Sister         colon polup     Heart Disease Brother         MI at 54     Other Cancer Brother      Hypertension Sister      Osteoporosis Sister      Obesity Sister      Lipids Brother      Hypertension Brother      Diabetes Brother      Hyperlipidemia Brother      Lipids Sister      Obesity Sister      Obesity Maternal Grandmother      Skin Cancer Maternal Grandmother         skin cancer unknown     Cancer Maternal Grandmother         unknown skin cancer on face     Obesity Paternal Grandmother        Social History:  Presents  "alone    Physical Exam     Patient Vitals for the past 24 hrs:   BP Temp Pulse Resp SpO2   02/20/22 2213 (!) 179/71 -- 60 20 100 %   02/20/22 2105 (!) 188/71 (!) 96.2  F (35.7  C) 68 18 98 %       Physical Exam  General: Alert, No obvious discomfort, well kept   HENT:  Normal voice, No lymphadenopathy  Eyes:  The pupils are equal, round, and reactive to light, Conjunctiva normal, No scleral icterus   Neck:  Normal range of motion  CV:  Normal Pulses, Normal cap refill  Resp:  Non-labored, No cough  MS:  Left index finger lateral aspect paronychia.  Mild surrounding erythema.  Patient has fixed interphalangeal joints.  She states that she \"had these removed because of arthritis.\"  Seem to have her baseline range of motion of fingers.  Skin:  No rash or acute skin lesions noted  Neuro:  Speech is normal and fluent, Normal sensation  Psych: Awake. Alert.  Normal affect.  Appropriate interactions. Good eye contact        Emergency Department Course     Imaging:  No orders to display       Laboratory:  Labs Ordered and Resulted from Time of ED Arrival to Time of ED Departure - No data to display    Procedures:  Incision and drainage: Paronychia  Anesthesia: 0.5% bupivacaine 1 cc injected in a field block around the distal tip of finger.  Patient tolerated procedure well.  Adequate anesthesia obtained.  Procedure: Area was opened using a straight iris scissors.  Purulent drainage released.  Small amount of ragged nail plate was trimmed.  Patient tolerated procedure well.  No complications.    Emergency Department Course:    Reviewed:  I reviewed nursing notes, vitals and past history    Assessments:   I obtained history and examined the patient as noted above.    I rechecked the patient and formed the procedure as listed above.  Discussed follow-up.     Interventions:  Medications - No data to display    Disposition:  The patient was discharged to home.    Impression & Plan      Medical Decision Making:  Chasity Hodgson is " a 76 year old female who presents for evaluation of a painful left finger.  This is consistent with a paronychia.  Incision and drainage yielded pus, the nail bed was also decompressed.  No signs of lymphangitis, marked cellulitis, gangrene, or other worrisome soft tissue/bony issue at this time.  Plan to f/u with podiatry or primary for wound recheck.  Would not start oral abx at this time. Warm soaks 3 x daily for 3-5 days Bacitracin and wound care discussed BID.  Stable for discharge.      Covid-19  Chasity Hodgson was evaluated during a global COVID-19 pandemic, which necessitated consideration that the patient might be at risk for infection with the SARS-CoV-2 virus that causes COVID-19.   Applicable protocols for evaluation were followed during the patient's care.   COVID-19 was considered as part of the patient's evaluation. The plan for testing is:Not indicated    Diagnosis:    ICD-10-CM    1. Paronychia of finger  L03.019        Discharge Medications:  New Prescriptions    No medications on file          Abhishek Lindquist, MAGALY CNP  02/20/22 2633

## 2022-02-23 ENCOUNTER — TRANSFERRED RECORDS (OUTPATIENT)
Dept: HEALTH INFORMATION MANAGEMENT | Facility: CLINIC | Age: 77
End: 2022-02-23

## 2022-02-23 ENCOUNTER — VIRTUAL VISIT (OUTPATIENT)
Dept: FAMILY MEDICINE | Facility: CLINIC | Age: 77
End: 2022-02-23
Payer: MEDICARE

## 2022-02-23 DIAGNOSIS — L03.012 CELLULITIS OF FINGER OF LEFT HAND: Primary | ICD-10-CM

## 2022-02-23 PROCEDURE — 99442 PR PHYSICIAN TELEPHONE EVALUATION 11-20 MIN: CPT | Mod: 95 | Performed by: INTERNAL MEDICINE

## 2022-02-23 RX ORDER — CEPHALEXIN 500 MG/1
500 CAPSULE ORAL 4 TIMES DAILY
Qty: 40 CAPSULE | Refills: 0 | Status: SHIPPED | OUTPATIENT
Start: 2022-02-23 | End: 2022-03-31

## 2022-02-23 NOTE — PROGRESS NOTES
Sammie is a 76 year old who is being evaluated via a billable telephone visit.      How would you like to obtain your AVS? Sidelineshargiftee  If the video visit is dropped, the invitation should be resent by: [employer] VIDEO   Will anyone else be joining your video visit? No    Video Start Time: 3:40 PM        Raheem Cochran is a 76 year old who presents for the following health issues     HPI     ED/UC Followup:    Facility:  [facility]  Date of visit: 2/20/2022  Reason for visit:     Paronychia of finger     Current Status:      Paronychia   Chasity Hodgson has had onset of painful of left finger.  A few weeks ago she trimmed her nail a bit closer than usual.  By this past Sunday she was experiencing throbbing and pain.  She had incision and drainage of fluid from the finger.  She is still experiencing soreness and swelling despite use of topical antibiotics and soaking in soapy water.  She still has had progression of erythema past her knuckle.  She has no fever, but has had chills. She has known diabetes mellitus.      Review of Systems   Constitutional, HEENT, cardiovascular, pulmonary, GI, , musculoskeletal, neuro, skin, endocrine and psych systems are negative, except as otherwise noted.      Objective    Vitals - Patient Reported  Pain Score: Severe Pain (7)  Pain Loc: Shoulder (Lower eyelid, Finger)      Vitals:  No vitals were obtained today due to virtual visit.    Physical Exam   GENERAL: Healthy, alert and no distress  RESP: No audible wheeze, cough,    SKIN: deferred  NEURO:   Mentation and speech appropriate for age.  PSYCH: Mentation appears normal, affect normal/bright, judgement and insight intact, normal speech       (L03.012) Cellulitis of finger of left hand  (primary encounter diagnosis)  Comment: Order for Keflex sent to the pharmacy and I encouraged her to go to orthopedics walk-in clinic this afternoon  Plan: cephALEXin (KEFLEX) 500 MG capsule                   Phone-Visit  Details    Type of service:  Phone Visit    Phone End Time:3:51 PM    Originating Location (pt. Location): Home    Distant Location (provider location):  [facility]     Platform used for Video Visit: [employer]

## 2022-02-24 ENCOUNTER — TRANSFERRED RECORDS (OUTPATIENT)
Dept: HEALTH INFORMATION MANAGEMENT | Facility: CLINIC | Age: 77
End: 2022-02-24

## 2022-02-24 ENCOUNTER — LAB REQUISITION (OUTPATIENT)
Dept: LAB | Facility: CLINIC | Age: 77
End: 2022-02-24
Payer: MEDICARE

## 2022-02-24 DIAGNOSIS — M79.646 PAIN IN UNSPECIFIED FINGER(S): ICD-10-CM

## 2022-02-24 PROCEDURE — 87075 CULTR BACTERIA EXCEPT BLOOD: CPT | Mod: ORL | Performed by: ORTHOPAEDIC SURGERY

## 2022-02-24 PROCEDURE — 87077 CULTURE AEROBIC IDENTIFY: CPT | Mod: ORL | Performed by: ORTHOPAEDIC SURGERY

## 2022-02-26 LAB — BACTERIA SPEC CULT: ABNORMAL

## 2022-03-03 ENCOUNTER — TRANSFERRED RECORDS (OUTPATIENT)
Dept: HEALTH INFORMATION MANAGEMENT | Facility: CLINIC | Age: 77
End: 2022-03-03
Payer: MEDICARE

## 2022-03-03 LAB — BACTERIA SPEC CULT: NORMAL

## 2022-03-06 ENCOUNTER — MYC MEDICAL ADVICE (OUTPATIENT)
Dept: FAMILY MEDICINE | Facility: CLINIC | Age: 77
End: 2022-03-06
Payer: MEDICARE

## 2022-03-07 ENCOUNTER — TELEPHONE (OUTPATIENT)
Dept: FAMILY MEDICINE | Facility: CLINIC | Age: 77
End: 2022-03-07
Payer: MEDICARE

## 2022-03-07 NOTE — TELEPHONE ENCOUNTER
See triage/PCP notes from today    This was addressed in separate encounter    Floresita ROSE, Triage RN  [facility]

## 2022-03-07 NOTE — TELEPHONE ENCOUNTER
Pointer finger on L hand Left shoulder has been aching, patient reports having recent general body aches. Patient is currently on Clindamycin for 10 days and has 5 days left. Swelling has decreased, but surrounding skin is still red and painful. Patient is feeling fatigued, no energy since 3/2 that is progressing. Overall it is 'a little bit' better.     Patient has dental appointment 3/15, asking if she should cancel appointment due to active MRSA infection. Advised patient to contact dental office to answer this question. Patient agrees and will contact Dental office.     1) Patient asking if MRSA infection can spread within the body?  2) And will patient need further antibiotics after Clindamycin is completed?

## 2022-03-07 NOTE — TELEPHONE ENCOUNTER
I spoke to Chasity Hodgson today.  She did she orthopedics and ultimately was seen by hand surgery.  Ultimately biopsy and culture was obtained and grew MRSA and was treated with clindamycin.  She informs me that she is feeling ill from this infection.     She was advised to continue antibiotics and recommend to follow up urgently in orthopedics and continue clindamycin.    Shola Benoit MD, MD

## 2022-03-16 DIAGNOSIS — C73 PAPILLARY CARCINOMA OF THYROID (H): Primary | ICD-10-CM

## 2022-03-16 RX ORDER — LEVOTHYROXINE SODIUM 112 MCG
112 TABLET ORAL DAILY
Qty: 90 TABLET | Refills: 3 | Status: SHIPPED | OUTPATIENT
Start: 2022-03-16 | End: 2023-03-30

## 2022-03-17 ENCOUNTER — TRANSFERRED RECORDS (OUTPATIENT)
Dept: HEALTH INFORMATION MANAGEMENT | Facility: CLINIC | Age: 77
End: 2022-03-17
Payer: MEDICARE

## 2022-03-21 ENCOUNTER — TRANSFERRED RECORDS (OUTPATIENT)
Dept: HEALTH INFORMATION MANAGEMENT | Facility: CLINIC | Age: 77
End: 2022-03-21
Payer: MEDICARE

## 2022-03-25 ENCOUNTER — TRANSFERRED RECORDS (OUTPATIENT)
Dept: HEALTH INFORMATION MANAGEMENT | Facility: CLINIC | Age: 77
End: 2022-03-25
Payer: MEDICARE

## 2022-03-25 LAB
ALT SERPL-CCNC: 14 IU/L (ref 0–32)
AST SERPL-CCNC: 29 IU/L (ref 0–40)
CREATININE (EXTERNAL): 1.05 MG/DL (ref 0.57–1)
GFR ESTIMATED (EXTERNAL): 55 ML/MIN/1.73
GLUCOSE (EXTERNAL): 198 MG/DL (ref 65–99)
POTASSIUM (EXTERNAL): 5.2 MMOL/L (ref 3.5–5.2)
TSH SERPL-ACNC: 3.9 UIU/ML (ref 0.45–4.5)

## 2022-03-28 ENCOUNTER — TRANSFERRED RECORDS (OUTPATIENT)
Dept: HEALTH INFORMATION MANAGEMENT | Facility: CLINIC | Age: 77
End: 2022-03-28
Payer: MEDICARE

## 2022-03-29 ENCOUNTER — TRANSFERRED RECORDS (OUTPATIENT)
Dept: HEALTH INFORMATION MANAGEMENT | Facility: CLINIC | Age: 77
End: 2022-03-29
Payer: MEDICARE

## 2022-03-31 ENCOUNTER — OFFICE VISIT (OUTPATIENT)
Dept: FAMILY MEDICINE | Facility: CLINIC | Age: 77
End: 2022-03-31
Payer: MEDICARE

## 2022-03-31 VITALS
SYSTOLIC BLOOD PRESSURE: 146 MMHG | WEIGHT: 202 LBS | DIASTOLIC BLOOD PRESSURE: 70 MMHG | TEMPERATURE: 97.3 F | RESPIRATION RATE: 18 BRPM | HEIGHT: 60 IN | HEART RATE: 52 BPM | BODY MASS INDEX: 39.66 KG/M2 | OXYGEN SATURATION: 99 %

## 2022-03-31 DIAGNOSIS — N18.30 STAGE 3 CHRONIC KIDNEY DISEASE, UNSPECIFIED WHETHER STAGE 3A OR 3B CKD (H): ICD-10-CM

## 2022-03-31 DIAGNOSIS — Z93.3 COLOSTOMY IN PLACE (H): ICD-10-CM

## 2022-03-31 DIAGNOSIS — I10 ESSENTIAL HYPERTENSION: ICD-10-CM

## 2022-03-31 DIAGNOSIS — R39.89 URINARY PROBLEM: ICD-10-CM

## 2022-03-31 DIAGNOSIS — A80.9 POLIOMYELITIS: ICD-10-CM

## 2022-03-31 DIAGNOSIS — Z01.818 PREOP GENERAL PHYSICAL EXAM: Primary | ICD-10-CM

## 2022-03-31 DIAGNOSIS — M54.50 BILATERAL LOW BACK PAIN WITHOUT SCIATICA, UNSPECIFIED CHRONICITY: ICD-10-CM

## 2022-03-31 DIAGNOSIS — L03.012 CELLULITIS OF FINGER OF LEFT HAND: ICD-10-CM

## 2022-03-31 DIAGNOSIS — E66.01 MORBID OBESITY (H): ICD-10-CM

## 2022-03-31 DIAGNOSIS — G47.33 OSA (OBSTRUCTIVE SLEEP APNEA): ICD-10-CM

## 2022-03-31 DIAGNOSIS — E11.9 TYPE 2 DIABETES, HBA1C GOAL < 7% (H): ICD-10-CM

## 2022-03-31 LAB
ALBUMIN UR-MCNC: NEGATIVE MG/DL
APPEARANCE UR: CLEAR
BACTERIA #/AREA URNS HPF: ABNORMAL /HPF
BILIRUB UR QL STRIP: NEGATIVE
COLOR UR AUTO: YELLOW
ERYTHROCYTE [DISTWIDTH] IN BLOOD BY AUTOMATED COUNT: 13.1 % (ref 10–15)
GLUCOSE UR STRIP-MCNC: NEGATIVE MG/DL
HCT VFR BLD AUTO: 37.6 % (ref 35–47)
HGB BLD-MCNC: 12.5 G/DL (ref 11.7–15.7)
HGB UR QL STRIP: NEGATIVE
KETONES UR STRIP-MCNC: NEGATIVE MG/DL
LEUKOCYTE ESTERASE UR QL STRIP: ABNORMAL
MCH RBC QN AUTO: 30.6 PG (ref 26.5–33)
MCHC RBC AUTO-ENTMCNC: 33.2 G/DL (ref 31.5–36.5)
MCV RBC AUTO: 92 FL (ref 78–100)
NITRATE UR QL: NEGATIVE
PH UR STRIP: 5 [PH] (ref 5–7)
PLATELET # BLD AUTO: 263 10E3/UL (ref 150–450)
RBC # BLD AUTO: 4.09 10E6/UL (ref 3.8–5.2)
RBC #/AREA URNS AUTO: ABNORMAL /HPF
SP GR UR STRIP: >=1.03 (ref 1–1.03)
UROBILINOGEN UR STRIP-ACNC: 0.2 E.U./DL
WBC # BLD AUTO: 9.5 10E3/UL (ref 4–11)
WBC #/AREA URNS AUTO: ABNORMAL /HPF

## 2022-03-31 PROCEDURE — 36415 COLL VENOUS BLD VENIPUNCTURE: CPT | Performed by: PHYSICIAN ASSISTANT

## 2022-03-31 PROCEDURE — 93000 ELECTROCARDIOGRAM COMPLETE: CPT | Performed by: PHYSICIAN ASSISTANT

## 2022-03-31 PROCEDURE — 81001 URINALYSIS AUTO W/SCOPE: CPT | Performed by: PHYSICIAN ASSISTANT

## 2022-03-31 PROCEDURE — 99215 OFFICE O/P EST HI 40 MIN: CPT | Performed by: PHYSICIAN ASSISTANT

## 2022-03-31 PROCEDURE — 85027 COMPLETE CBC AUTOMATED: CPT | Performed by: PHYSICIAN ASSISTANT

## 2022-03-31 PROCEDURE — 80048 BASIC METABOLIC PNL TOTAL CA: CPT | Performed by: PHYSICIAN ASSISTANT

## 2022-03-31 NOTE — PROGRESS NOTES
[address], [address]  [facility] [address]  Phone: [phone]  Primary Provider: Shola Benoit        PREOPERATIVE EVALUATION:  Today's date: 3/31/2022    Chasity Hodgson is a 76 year old female who presents for a preoperative evaluation.    Surgical Information:  Surgery/Procedure: Removal of screw and wire in left index finger  Surgery Location: [facility]   Surgeon: Dr Jack García  Surgery Date: 4/5/22  Time of Surgery: 1:45 pm  Where patient plans to recover: Other: At home with a friend  Fax number for surgical facility: [phone]    Type of Anesthesia Anticipated: to be determined    Assessment & Plan     The proposed surgical procedure is considered LOW risk.    Assessment and Plan:     (Z01.818) Preop general physical exam  (primary encounter diagnosis)  Comment: cleared for surgery, functional status a bit hard to assess due to chronic pain but no cardiac symptoms, sounds like she is able to do 4 METs  Plan: UA with Microscopic reflex to Culture - lab         collect, CBC with platelets, Basic metabolic         panel  (Ca, Cl, CO2, Creat, Gluc, K, Na, BUN),         EKG 12-lead complete w/read - Clinics, Urine         Microscopic  Cleared for surgery, see recs below    (L03.012) Cellulitis of finger of left hand  Comment: left index finger, MRSA on culture, has hardware in that finger  Plan: hardware out and I and D w/ortho    (I10) Essential hypertension  Comment: on amlodipine, atenolol   Plan: cont above, take the a of surgery    (E66.01) Morbid obesity (H)  Comment: bmi 39  Plan:     (G47.33) ANNETTE (obstructive sleep apnea)  Comment:   Plan: monitor cardiopulm status and pulse ox closely in blessing-op period     (E11.9) Type 2 diabetes, HbA1c goal < 7% (H)  Comment: on tresiba and januvia  Plan: take tresiba 7 or 10 units the am of surgery depending on BS, see below, hold januvia until taking po    (N18.30) Stage 3 chronic kidney disease,  unspecified whether stage 3a or 3b CKD (H)  Comment: baseline creat 1-1.2  Plan: avoid nephrotoxins, hypotension    (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)  Comment:   Plan:     (A80.9) Poliomyelitis  Comment: RLE in a brace  Plan:     (R39.89) Urinary problem  Comment: had some dysuria yesterday, Plan: UA and micro today    (M54.50) Bilateral low back pain without sciatica, unspecified chronicity  Comment: has had some back pain for last month which is positional and very low and mostly righ-sided, no alarm symptoms  Plan: follow-up with ortho if persists    Risks and Recommendations:  The patient has the following additional risks and recommendations for perioperative complications:   - Consult Hospitalist / IM to assist with post-op medical management    Medication Instructions:  Stop all NSAIDs (motrin, ibuprofen, naproxen, aleve, advil, naprosyn, aspirin)  for at least 5 days prior to surgery.    Take amlodipine, atenolol, pepcid, lisinopril, protonix and synthroid with a sip of water the am of surgery.    Take 10 units of Tresiba the am of surgery (if blood sugar is less than 150, take 7 units).     Skip lasix the am of surgery.    Take Januvia when you start eating the day of surgery.    RECOMMENDATION:  APPROVAL GIVEN to proceed with proposed procedure, without further diagnostic evaluation.    Maude Ortiz PA-C  45 minutes on the day of the encounter doing chart review, history and exam, documentation and further activities as noted above.          Subjective     HPI related to upcoming procedure:   Will have left index finger surgery for infection.  She noticed some redness and swelling a few weeks ago.  She has been on antibiotics.  She has also had an I and D.  Recent culture positive for MRSA.  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    She has a lot of left hip and knee pain.  Has appointment with Dr. Henderson (ortho) tomorrow.      She had one episode of  dysuria yesterday, none today.    She reports right low back pain x one month, worsens with certain movements.  She denies loss of control of bowel/bladder.  She denies saddle anesthesia.    She is able to do light housekeeping.  She cannot do stairs due to knee/hip pain.  Preop Questions 3/28/2022   1. Have you ever had a heart attack or stroke? No   2. Have you ever had surgery on your heart or blood vessels, such as a stent placement, a coronary artery bypass, or surgery on an artery in your head, neck, heart, or legs? No   3. Do you have chest pain with activity? No   4. Do you have a history of  heart failure? No   5. Do you currently have a cold, bronchitis or symptoms of other infection? YES - finger   6. Do you have a cough, shortness of breath, or wheezing? No   7. Do you or anyone in your family have previous history of blood clots? YES - right arm w/picc   8. Do you or does anyone in your family have a serious bleeding problem such as prolonged bleeding following surgeries or cuts? No   9. Have you ever had problems with anemia or been told to take iron pills? YES - on ferrous sulfate   10. Have you had any abnormal blood loss such as black, tarry or bloody stools, or abnormal vaginal bleeding? No   11. Have you ever had a blood transfusion? YES -    11a. Have you ever had a transfusion reaction? No   12. Are you willing to have a blood transfusion if it is medically needed before, during, or after your surgery? Yes   13. Have you or any of your relatives ever had problems with anesthesia? No   14. Do you have sleep apnea, excessive snoring or daytime drowsiness? UNKNOWN -    14a. Do you have a CPAP machine? -   15. Do you have any artifical heart valves or other implanted medical devices like a pacemaker, defibrillator, or continuous glucose monitor? No   16. Do you have artificial joints? YES -    17. Are you allergic to latex? No       Health Care Directive:  Patient has a Health Care Directive on  file      Review of Systems  CONSTITUTIONAL: NEGATIVE for fever, chills, change in weight  INTEGUMENTARY/SKIN: NEGATIVE for worrisome rashes, moles or lesions  EYES: NEGATIVE for vision changes or irritation  ENT/MOUTH: NEGATIVE for ear, mouth and throat problems  RESP: NEGATIVE for significant cough or SOB  CV: NEGATIVE for chest pain, palpitations or peripheral edema  GI: NEGATIVE for nausea, abdominal pain, heartburn, or change in bowel habits  NEURO: NEGATIVE for weakness, dizziness or paresthesias  ENDOCRINE: NEGATIVE for temperature intolerance, skin/hair changes  HEME: NEGATIVE for bleeding problems    Patient Active Problem List    Diagnosis Date Noted     Renal artery stenosis (H) 07/31/2020     Priority: Medium     CKD (chronic kidney disease) stage 3, GFR 30-59 ml/min (H) 04/17/2019     Priority: Medium     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     Priority: Medium     Morbid obesity (H) 08/01/2018     Priority: Medium     Normocytic anemia 01/16/2017     Priority: Medium     Allergic dermatitis due to other chemical product 10/02/2016     Priority: Medium     Insufficiency, arterial, peripheral (H) 11/08/2015     Priority: Medium     Mild seen on CARMITA 11/2015 - right sided       Type 2 diabetes mellitus with other specified complication (H) 10/18/2015     Priority: Medium     Carotid stenosis 12/09/2014     Priority: Medium     Mild increased stenosis 50-69% left ICA       Right shoulder pain 12/09/2014     Priority: Medium     Left knee pain 11/03/2014     Priority: Medium     Poliomyelitis      Priority: Medium     poor circulation right leg       Diabetic gastroparesis (H)      Priority: Medium     Dermatitis 02/15/2014     Priority: Medium     Acne rosacea 02/15/2014     Priority: Medium     Esophageal reflux 01/23/2014     Priority: Medium     Had upper endoscopy - Dr. Pantoja 2013       Pulmonary nodule 09/06/2013     Priority: Medium     Alopecia 02/09/2013     Priority: Medium     Problem list name  updated by automated process. Provider to review       Papillary carcinoma of thyroid (H)      Priority: Medium     s/p thyroidectomy - Ruegemer       Gastroparesis 11/12/2012     Priority: Medium     Postsurgical hypothyroidism      Priority: Medium     s/p papillary thryoid cancer - Marj  Concern for possible recurrence 03/2014       Type 2 diabetes, HbA1c goal < 7% (H)      Priority: Medium     ACP (advance care planning) 09/21/2012     Priority: Medium     Discussed advance care planning with patient; information given to patient to review. 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 Provider to review       ANNETTE (obstructive sleep apnea)      Priority: Medium     Mixed hyperlipidemia 10/31/2010     Priority: Medium     Low back pain 07/15/2009     Priority: Medium      Past Medical History:   Diagnosis Date     Abdominal adhesions 1984, 96,99    s/p lysis     Abdominal adhesions      Acne rosacea      Allergic rhinitis      Allergic rhinitis, cause unspecified      Alopecia      Anemia      CAD (coronary artery disease)      Carotid stenosis      CKD (chronic kidney disease) stage 2, GFR 60-89 ml/min      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)      CPAP (continuous positive airway pressure) dependence      Depression      Diabetic gastroparesis (H)      Diet-controlled type 2 diabetes mellitus (H)      DM2 (diabetes mellitus, type 2) (H)      DVT (deep venous thrombosis) (H)      DVT of axillary vein, acute right (H)      Fibromyalgia       Gastro-oesophageal reflux disease      GERD (gastroesophageal reflux disease)      Hernia of unspecified site of abdominal cavity without mention of obstruction or gangrene     bilateral     History of blood transfusion     10/ 1980     HTN (hypertension)      HTN (hypertension)      Hypertriglyceridemia      Hypertriglyceridemia      Hypokalemia      Hypothyroidism      Obstructive sleep apnea      ANNETTE (obstructive sleep apnea)      ANNETTE on CPAP      Osteoarthritis of glenohumeral joint      Papillary carcinoma of thyroid (H)     s/p thyroidectomy - Ruegemer     Papillary carcinoma of thyroid (H)      PE (pulmonary embolism)      Poliomyelitis     poor circulation right leg     Poliomyelitis      Postsurgical hypothyroidism     s/p papillary thryoid cancer - Ruegemer     Pulmonary embolism (H)      Pulmonary embolus (H)      Pulmonary nodule      Rosacea      S/P carpal tunnel release     bilateral     S/P hardware removal 01/2014    still with lingering foot pain     S/P shoulder surgery     bilateral     Septic joint (H)     right knee     Septic joint of right knee joint (H)      Venous insufficiency      Venous insufficiency      Venous thrombosis 1999    right axillary vein     Past Surgical History:   Procedure Laterality Date     AMPUTATE TOE(S)  3/15/2012    Procedure:AMPUTATE TOE(S); Surgeon:RUBEN MOSES; Location: OR     AMPUTATE TOE(S)       APPENDECTOMY  1972     APPENDECTOMY       ARTHRODESIS FOOT  3/15/2012    Procedure:ARTHRODESIS FOOT; RIGHT TRIPLE ARTHRODESIS, FIFTH TOE AMPUTATION, LATERAL LIGAMENT RECONSTRUCTION, TENDON TRANSFER AND RELEASE [MINI C-ARM, ARTHREX 4.5 AND 6.7 STAPLES, BIOCOMPOSITE TENODESIS SCREWS]; Surgeon:RUBEN MOSES; Location: OR     ARTHRODESIS FOOT Right     Right foot triple arthrodesis and removal of hardware     ARTHROSCOPY SHOULDER  06/25/2015    REVISION SUBACROMIAL DECOMPRESSION, EXCISION OF GANGLION CYST, DEBRIDEMENT AND EXCISION OF THE  GLENOHUMERAL JOINT GANGLION CYST, CORACOID DECOMPRESSION, POSSIBLE SUBSCAPULARIS REPAIR AND OPEN SUBSCAPULARIS BICEP     ARTHROSCOPY SHOULDER ROTATOR CUFF REPAIR       BIOPSY  Thyroid 2002     BREAST SURGERY  Biopsy     CHOLECYSTECTOMY       CHOLECYSTECTOMY       COLONOSCOPY  2018     COLOSTOMY  2/7/2012    Procedure:COLOSTOMY; CREATION OF SIGMOID COLOSTOMY AND EXTENSIVE  LYSIS OF ADHESIONS; Surgeon:MONTSERRAT BENDER; Location:[facility] OR     COLOSTOMY       EYE SURGERY       GENITOURINARY SURGERY  1999     GI SURGERY      weakened rectal sphincter with artificial stimulator     HERNIA REPAIR  1976     HYSTERECTOMY TOTAL ABDOMINAL       LAPAROTOMY, LYSIS ADHESIONS, COMBINED  2/7/2012    Procedure:COMBINED LAPAROTOMY, LYSIS ADHESIONS; Surgeon:MONTSERRAT BENDER; Location:[facility] OR     RELEASE CARPAL TUNNEL       RELEASE TENDON FOOT  3/15/2012    Procedure:RELEASE TENDON FOOT; Surgeon:SUKHDEEP METZ; Location: OR     REMOVE HARDWARE FOOT  12/13/2012    Procedure: REMOVE HARDWARE FOOT;  RIGHT FOOT REMOVAL OF HARDWARE;  Surgeon: Sukhdeep Metz MD;  Location:  OR     SHOULDER SURGERY  11/12/2020    LEFT SHOULDER HEMIARHTROPLASTY, BICEP TENODESIS     SOFT TISSUE SURGERY  2018, 2020     TENDON RELEASE      foot     THYROIDECTOMY       ZZC FREEING BOWEL ADHESION,ENTEROLYSIS      1986, 1996, 1999     ZZC NONSPECIFIC PROCEDURE      throidectomy     ZZC TOTAL ABDOM HYSTERECTOMY  1980    + BSO     Current Outpatient Medications   Medication Sig Dispense Refill     albuterol (PROAIR HFA/PROVENTIL HFA/VENTOLIN HFA) 108 (90 Base) MCG/ACT inhaler Inhale 2-4 puffs into the lungs every 2 hours as needed for shortness of breath / dyspnea 1 Inhaler 1     amLODIPine (NORVASC) 2.5 MG tablet Take 2.5mg in AM, 2.5mg at noon, and 5mg at bedtime 360 tablet 3     aspirin (SB LOW DOSE ASA EC) 81 MG EC tablet Take 81 mg by mouth daily       atenolol (TENORMIN) 50 MG tablet TAKE ONE TABLET BY MOUTH TWICE A  tablet 2      azelastine (ASTEPRO) 0.15 % nasal spray 1 spray       cephALEXin (KEFLEX) 500 MG capsule Take 1 capsule (500 mg) by mouth 4 times daily 40 capsule 0     Cholecalciferol (VITAMIN D-3 PO) Take 2 tablets by mouth       clotrimazole (LOTRIMIN) 1 % cream Apply topically daily       Continuous Blood Gluc Sensor (FREESTYLE BRANDY 14 DAY SENSOR) [facility] [place] Apply 1 sensor and change every 14 days. 2 each 11     diphenhydrAMINE-acetaminophen (TYLENOL PM)  MG tablet Take 1 tablet by mouth At Bedtime Reported on 3/20/2017       ezetimibe (ZETIA) 10 MG tablet TAKE ONE TABLET BY MOUTH ONCE DAILY 90 tablet 3     famotidine (PEPCID) 20 MG tablet Take 2 tablets (40 mg) by mouth as needed 180 tablet 3     fenofibrate (TRICOR) 145 MG tablet TAKE ONE TABLET BY MOUTH ONCE DAILY 90 tablet 3     ferrous sulfate (IRON) 325 (65 FE) MG tablet Take 325 mg by mouth daily (with breakfast)       fexofenadine (ALLEGRA) 180 MG tablet Take 180 mg by mouth daily. 120 0     fluocinolone acetonide (DERMA SMOOTHE/FS BODY) 0.01 % external oil Apply 2 mL to scalp once per week. Massage into scalp. Can be left in overnight or washed out after 4-6 hours. 118.28 mL 5     fluticasone (FLONASE) 50 MCG/ACT spray Spray 2 sprays in nostril daily 2 sprays in each nostril qd 1 Bottle 0     furosemide (LASIX) 20 MG tablet Take 1 tablet (20 mg) by mouth daily 90 tablet 1     gabapentin (NEURONTIN) 100 MG capsule Take 1 capsule (100 mg) by mouth every evening as needed 90 capsule 3     insulin glargine U-300 (TOUJEO SOLOSTAR) 300 UNIT/ML (1 units dial) pen Inject 22 Units Subcutaneous At Bedtime 15 mL 3     lisinopril (ZESTRIL) 40 MG tablet TAKE ONE TABLET BY MOUTH ONCE DAILY 90 tablet 3     metoclopramide (REGLAN) 5 MG tablet Take 1-2 tablets (5-10 mg) by mouth 2 times daily as needed a 120 tablet 0     MULTIVITAMINS OR TABS ONE DAILY 100 3     nitroGLYcerin (NITROSTAT) 0.4 MG sublingual tablet Place 1 tablet (0.4 mg) under the tongue every 5 minutes as needed for  chest pain (no more than 3 in one hour; after 3rd, call 911.) 25 tablet 3     nystatin (MYCOSTATIN) cream Apply topically daily as needed        nystatin-triamcinolone (MYCOLOG II) cream Apply topically daily as needed        ondansetron (ZOFRAN) 4 MG tablet Take 1 tablet by mouth every 6 hours as needed Reported on 3/20/2017       order for DME Equipment being ordered: Compression stockings - Knee High; 20-30 mmHg compression - note would like adhesive band to keep the stocking from sliding down 3 each 0     order for DME Equipment being ordered: Oral appliance for sleep apnea 1 Units 0     pantoprazole (PROTONIX) 40 MG EC tablet Take 40 mg by mouth 2 times daily       sitagliptin (JANUVIA) 50 MG tablet Take 1 tablet (50 mg) by mouth daily 90 tablet 3     SYNTHROID 112 MCG tablet Take 1 tablet (112 mcg) by mouth daily Take 112 mcg daily except for Friday takes only 56 mcg.  Brand name Synthroid 90 tablet 3     TRESIBA FLEXTOUCH 100 UNIT/ML pen Inject 22 Units Subcutaneous every morning 5 mL 11     tretinoin (RETIN-A) 0.025 % external cream Use every night as tolerated - spot treat lesion 20 g 3       Allergies   Allergen Reactions     Nsaids Difficulty breathing     Increased creatinine     Toradol Difficulty breathing     Shortness of breath     Celecoxib Itching and Rash     Codeine Itching     With higher doses     Crestor [Rosuvastatin] Muscle Pain (Myalgia)     No Clinical Screening - See Comments Itching     Fragrance     Vioxx Other (See Comments)     Heart races     Conjugated Estrogens Rash     Sulfa Drugs Rash        Social History     Tobacco Use     Smoking status: Never Smoker     Smokeless tobacco: Never Used   Substance Use Topics     Alcohol use: Not Currently     Alcohol/week: 0.0 standard drinks     Family History   Problem Relation Age of Onset     Arthritis Mother      Hypertension Mother      Cerebrovascular Disease Mother      Obesity Mother      Heart Disease Mother         MI's      Hypertension Father      Respiratory Father         Adult RDS     Diabetes Father         adult     Arthritis Sister      Cancer Sister      Diabetes Sister      Hypertension Sister      Breast Cancer Sister      Depression Sister      Thyroid Disease Sister      Obesity Sister      Arthritis Sister      Hypertension Sister      Thyroid Disease Sister      Cancer Sister         colon polup     Heart Disease Brother         MI at 54     Other Cancer Brother      Hypertension Sister      Osteoporosis Sister      Obesity Sister      Lipids Brother      Hypertension Brother      Diabetes Brother      Hyperlipidemia Brother      Lipids Sister      Obesity Sister      Obesity Maternal Grandmother      Skin Cancer Maternal Grandmother         skin cancer unknown     Cancer Maternal Grandmother         unknown skin cancer on face     Obesity Paternal Grandmother      History   Drug Use No         Objective     BP (!) 146/70 (Patient Position: Sitting, Cuff Size: Adult Large)   Pulse 52   Temp 97.3  F (36.3  C) (Temporal)   Resp 18   Ht 1.524 m (5')   Wt 91.6 kg (202 lb)   SpO2 99%   BMI 39.45 kg/m      Physical Exam      GENERAL APPEARANCE: healthy, alert and no distress     EYES: no scleral icterus     HENT: OP clear mouth without ulcers or lesions     NECK: supple, no bruits     RESP: lungs clear to auscultation - no rales, rhonchi or wheezes     CV: regular rates and rhythm, normal S1 S2, no S3 or S4 and no murmur, click or rub     ABDOMEN:  soft, nontender, no HSM or masses and bowel sounds normal, +colostomy pouch     MS: extremities normal- no gross deformities noted, mild-moderate pitting edema, RLE in brace     NEURO: Normal mentation, gait and speechnormal     PSYCH: mentation appears normal. and affect normal/bright  SPINE:  No visual deformities, swelling, erythema or skin lesions over the thoracic and lumbar spine  No pain with palpation over spinous processes/musculature over the thoracic and lumbar  spine   No CVA tenderness          Recent Labs   Lab Test 01/11/22  1359 10/28/21  1205 10/07/21  1400   HGB 11.7 12.2  --     289  --     135  --    POTASSIUM 4.7 4.4  --    CR 1.07* 1.08*  --    A1C 9.6*  --  9.5*        Diagnostics:  EKG: sinus bradycardia, no ischemia  Labs pending (CBC and BMP)    Revised Cardiac Risk Index (RCRI):  The patient has the following serious cardiovascular risks for perioperative complications:   - Diabetes Mellitus (on Insulin) = 1 point     RCRI Interpretation: 1 point: Class II (low risk - 0.9% complication rate)           Signed Electronically by: Maude Ortiz PA-C  Copy of this evaluation report is provided to requesting physician.

## 2022-03-31 NOTE — PATIENT INSTRUCTIONS
Stop all NSAIDs (motrin, ibuprofen, naproxen, aleve, advil, naprosyn, aspirin)  for at least 5 days prior to surgery.    Take amlodipine, atenolol, pepcid, lisinopril, protonix and synthroid with a sip of water the am of surgery.    Take 10 units of Tresiba the am of surgery (if blood sugar is less than 150, take 7 units).     Skip lasix the am of surgery.    Take Januvia when you start eating the day of surgery.

## 2022-03-31 NOTE — RESULT ENCOUNTER NOTE
Dear Sammie,     Here are your recent urine results which do not look infected.  Please monitor your symptoms for now and let me know if you have any further burning with urination.       Regards,  Maude Ortiz PA-C

## 2022-04-01 LAB
ANION GAP SERPL CALCULATED.3IONS-SCNC: 4 MMOL/L (ref 3–14)
BUN SERPL-MCNC: 29 MG/DL (ref 7–30)
CALCIUM SERPL-MCNC: 9.3 MG/DL (ref 8.5–10.1)
CHLORIDE BLD-SCNC: 106 MMOL/L (ref 94–109)
CO2 SERPL-SCNC: 27 MMOL/L (ref 20–32)
CREAT SERPL-MCNC: 1.28 MG/DL (ref 0.52–1.04)
GFR SERPL CREATININE-BSD FRML MDRD: 43 ML/MIN/1.73M2
GLUCOSE BLD-MCNC: 194 MG/DL (ref 70–99)
POTASSIUM BLD-SCNC: 4.7 MMOL/L (ref 3.4–5.3)
SODIUM SERPL-SCNC: 137 MMOL/L (ref 133–144)

## 2022-04-01 NOTE — RESULT ENCOUNTER NOTE
Dear Sammie,     Here are your recent results which show a slight dip in your kidney function.  Your electrolytes are normal.  Please recheck when you see Dr. Benoit.    Please continue with your current plan of care and let us know if you have any questions or concerns.    Regards,  Maude Ortiz PA-C

## 2022-04-05 ENCOUNTER — NURSE TRIAGE (OUTPATIENT)
Dept: NURSING | Facility: CLINIC | Age: 77
End: 2022-04-05
Payer: MEDICARE

## 2022-04-05 ENCOUNTER — LAB REQUISITION (OUTPATIENT)
Dept: LAB | Facility: CLINIC | Age: 77
End: 2022-04-05
Payer: MEDICARE

## 2022-04-05 DIAGNOSIS — Z00.00 ENCOUNTER FOR GENERAL ADULT MEDICAL EXAMINATION WITHOUT ABNORMAL FINDINGS: ICD-10-CM

## 2022-04-05 PROCEDURE — 87070 CULTURE OTHR SPECIMN AEROBIC: CPT | Mod: ORL | Performed by: ORTHOPAEDIC SURGERY

## 2022-04-06 NOTE — TELEPHONE ENCOUNTER
Pt is calling.    Had surgery today, on her index finger on her left hand.  Just noticed that they left the IV in.  It was done at the surgery center through [facility].  Pt is unable to take it off herself because her hand in in a cast and her IV is in the opposite arm. Pt is home by herself tonight.      I encouraged her to call them first thing in the AM. They may want her to to come back in to have it removed. May also, be seen in urgent care to have it removed.   She verbalized understanding and will call them in the AM.    Meme Escoto RN  [facility] Nurse Advisor  4/5/2022 at 7:21 PM          COVID 19 Nurse Triage Plan/Patient Instructions    Please be aware that novel coronavirus (COVID-19) may be circulating in the community. If you develop symptoms such as fever, cough, or SOB or if you have concerns about the presence of another infection including coronavirus (COVID-19), please contact your health care provider or visit [email or web address].     Disposition/Instructions    Home care recommended. Follow home care protocol based instructions.    Thank you for taking steps to prevent the spread of this virus.  o Limit your contact with others.  o Wear a simple mask to cover your cough.  o Wash your hands well and often.    Resources    [facility]: About COVID-19: [email or web address]    CDC: What to Do If You're Sick: www.cdc.gov/coronavirus/2019-ncov/about/steps-when-sick.html    CDC: Ending Home Isolation: www.cdc.gov/coronavirus/2019-ncov/hcp/disposition-in-home-patients.html     CDC: Caring for Someone: www.cdc.gov/coronavirus/2019-ncov/if-you-are-sick/care-for-someone.html     [facility]: Interim Guidance for Hospital Discharge to Home: www.health.[facility].[place].us/diseases/coronavirus/hcp/hospdischarge.pdf    [facility] clinical trials (COVID-19 research studies): clinicalaffairs.[facility]/umn-clinical-trials     Below are the COVID-19 hotlines at the  [place] Department of Health ([facility]). Interpreters are available.   o For health questions: Call 513-827-3271 or 1-974.382.6633 (7 a.m. to 7 p.m.)  o For questions about schools and childcare: Call 937-944-1711 or 1-784.717.8080 (7 a.m. to 7 p.m.)     Reason for Disposition    [1] Caller requesting NON-URGENT health information AND [2] PCP's office is the best resource    Additional Information    Negative: [1] Caller is not with the adult (patient) AND [2] reporting urgent symptoms    Negative: Lab result questions    Negative: Medication questions    Negative: Caller can't be reached by phone    Negative: Caller has already spoken to PCP or another triager    Negative: RN needs further essential information from caller in order to complete triage    Negative: Requesting regular office appointment    Protocols used: INFORMATION ONLY CALL - NO TRIAGE-A-

## 2022-04-07 LAB — BACTERIA SPEC CULT: NO GROWTH

## 2022-04-11 NOTE — PROCEDURES
HairMetrix Summary (8/16/2021)    Frontal anterior    Mid scalp    Vertex    Occipital    Right temporal    Left temporal    Summary

## 2022-04-18 ENCOUNTER — VIRTUAL VISIT (OUTPATIENT)
Dept: DERMATOLOGY | Facility: CLINIC | Age: 77
End: 2022-04-18
Payer: MEDICARE

## 2022-04-18 DIAGNOSIS — L29.9 PRURITUS: ICD-10-CM

## 2022-04-18 DIAGNOSIS — L30.9 DERMATITIS: ICD-10-CM

## 2022-04-18 DIAGNOSIS — L65.8 FEMALE PATTERN HAIR LOSS: Primary | ICD-10-CM

## 2022-04-18 PROCEDURE — 99213 OFFICE O/P EST LOW 20 MIN: CPT | Mod: GQ | Performed by: DERMATOLOGY

## 2022-04-18 NOTE — NURSING NOTE
Dermatology Rooming Note    Chasity Hodgson's goals for this visit include:   Chief Complaint   Patient presents with     Hair Loss     No change     Saloni Castaneda, CMA

## 2022-04-18 NOTE — PATIENT INSTRUCTIONS
[facility] Dermatology Visit    Thank you for allowing us to participate in your care. Your findings, instructions and follow-up plan are as follows:         When should I call my doctor?  If you are worsening or not improving, please, contact us or seek urgent care as noted below.     Who should I call with questions (adults)?  [facility] (adult and pediatric): [phone]   [facility] (adult): [phone]  For urgent needs outside of business hours call the [facility] at [phone] and ask for the dermatology resident on call  If this is a medical emergency and you are unable to reach an ER, Call 911      Who should I call with questions (pediatric)?  [facility]- Pediatric Dermatology  Dr. Liliya Barba, Dr. Margarette Stein, Dr. Brenda Almodovar, Arianna Cartwright, PA  Dr. Caro Car, Dr. Renetta Meyer & Dr. Tristen Anderson  Non Urgent  Nurse Triage Line; [phone]- Shahla and Concha RN Care Coordinators   [place] (/Complex ) [phone]    If you need a prescription refill, please contact your pharmacy. Refills are approved or denied by our Physicians during normal business hours, Monday through Fridays  Per office policy, refills will not be granted if you have not been seen within the past year (or sooner depending on your child's condition)    Scheduling Information:  Pediatric Appointment Scheduling and Call Center [phone]  Radiology Scheduling- [phone]  Sedation Unit Scheduling- [phone]  [place] Scheduling- General [phone]; Pediatric Dermatology [phone]  Main  Services: [phone]  Indian: [phone]  Tristanian: [phone]  Hmong/Estonian/Palestinian: [phone]  Preadmission Nursing Department Fax Number: [phone] (Fax all pre-operative paperwork to this number)    For urgent matters arising during evenings, weekends, or  holidays that cannot wait for normal business hours please call [phone] and ask for the Dermatology Resident On-Call to be paged.

## 2022-04-18 NOTE — PROGRESS NOTES
[facility] Dermatology Note  Encounter Date: Apr 18, 2022  Telephone [phone]). Location of teledermatologist: [facility] [place]. Start time: 12:46 PM. End time: 12:54 PM.    Dermatology Problem List:  # Female pattern hair loss  - Current tx: laser comb up to 3x weekly (switching to band 8/2021), free and clear shampoo, fluocinolone acetonide 0.01% oil once per week  - Previously on 1/2 tab of minoxidil 2.5 mg daily, discontinued secondary to elevated creatinine 10/2020, has since stabilized  -Baseline Hair Metrix 8/16/2021   # Tinea corporis or candida/yeast infection - resolved  - Nystatin powder, OTC lotrimin cream   #. Rosacea - stable  - Vanicream  # Venous stasis dermatitis - stable  - Vanicream  # Allergic contact dermatitis - stable  - Uses fragrance-free products  - Patch testing revealed mild reaction to Balsam of [place], fragrance mix, and a strong reaction to paraphenylenediamine.   # Xerosis cutis  -Recommend continuing use of a gentle, fragrance-free emollient based moisturizer such as Vanicream or CeraVe.   # Abrasion , right upper arm 02/15/2021.   - Monitor, photo taken today.   # Milia, right lateral eye  - Removed 8/16/2021   - prior tx: tretinoin    ____________________________________________    Assessment & Plan:     # Female pattern hair loss- stable.  # Hx of allergic contact dermatitis of scalp  Hair loss is overall stable but has undergone some stressful health events recently. Will continue with the current plan.   - Restart fluocinolone 0.01% oil once per week for scalp pruritus and dryness.  - Continue to use laser headband or comb at least 3 times per week.  - Continue to use of Free & Clear shampoo.    Procedures Performed:    None    Follow-up: 6 month(s) in-person, or earlier for new or changing lesions    Staff and Scribe:     Scribe Disclosure:  IJanis, am serving as a scribe to document services personally  performed by Renetta Meyer MD based on data collection and the provider's statements to me.     Provider Disclosure:   The documentation recorded by the scribe accurately reflects the services I personally performed and the decisions made by me.    Renetta Meyer MD  Professor and Chair  Department of Dermatology  [facility]: Phone: [phone], Fax:[phone]  [facility]: Phone: [phone], Fax: [phone]      ____________________________________________    CC: Hair Loss (No change)    HPI:  Ms. Chasity Hodgson is a(n) 76 year old female who presents today as a return patient for hair loss and scalp dermatitis. Last seen by myself on 8/16/21 at which point she was started on fluocinolone 0.01% oil once weekly for itching and continued on Free and Clear shampoo as well as LLLT for female pattern hair loss.     Today, the patient states that her hair loss in stable. She is using LLLT 3x weekly, but recently stopped fluocinolone oil due to other stressors listed below. She would like to restart the oil. She is avoiding known allergens.     She recently underwent treatment (3 weeks of clindamycin) for a MRSA infection of her L finger. L shoulder and hip replaced in 2020, but she has redeveloped pain in these areas. Patient is otherwise feeling well, without additional skin concerns.    Labs Reviewed:  N/A    Physical Exam:  Vitals: There were no vitals taken for this visit.  SKIN: Teledermatology photos were reviewed; image quality and interpretability: acceptable. Image date: 4/18/22.  - Frontal, R, and L temple scalp appears stable to improved from previously.   - No other lesions of concern on areas examined.     Medications:  Current Outpatient Medications   Medication     albuterol (PROAIR HFA/PROVENTIL HFA/VENTOLIN HFA) 108 (90 Base) MCG/ACT inhaler     amLODIPine (NORVASC) 2.5 MG  tablet     aspirin (ASA) 81 MG EC tablet     atenolol (TENORMIN) 50 MG tablet     azelastine (ASTEPRO) 0.15 % nasal spray     Cholecalciferol (VITAMIN D-3 PO)     clotrimazole (LOTRIMIN) 1 % cream     Continuous Blood Gluc Sensor (FREESTYLE BRANDY 14 DAY SENSOR) MISC     diphenhydrAMINE-acetaminophen (TYLENOL PM)  MG tablet     ezetimibe (ZETIA) 10 MG tablet     famotidine (PEPCID) 20 MG tablet     fenofibrate (TRICOR) 145 MG tablet     ferrous sulfate (IRON) 325 (65 FE) MG tablet     fexofenadine (ALLEGRA) 180 MG tablet     fluocinolone acetonide (DERMA SMOOTHE/FS BODY) 0.01 % external oil     fluticasone (FLONASE) 50 MCG/ACT spray     furosemide (LASIX) 20 MG tablet     gabapentin (NEURONTIN) 100 MG capsule     lisinopril (ZESTRIL) 40 MG tablet     MULTIVITAMINS OR TABS     nitroGLYcerin (NITROSTAT) 0.4 MG sublingual tablet     nystatin (MYCOSTATIN) cream     nystatin-triamcinolone (MYCOLOG II) cream     ondansetron (ZOFRAN) 4 MG tablet     order for DME     order for DME     pantoprazole (PROTONIX) 40 MG EC tablet     sitagliptin (JANUVIA) 50 MG tablet     SYNTHROID 112 MCG tablet     TRESIBA FLEXTOUCH 100 UNIT/ML pen     tretinoin (RETIN-A) 0.025 % external cream     No current facility-administered medications for this visit.      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)     Anemia due to blood loss, acute     ACP (advance care planning)     Postsurgical hypothyroidism     Type 2 diabetes, HbA1c goal < 7% (H)     Gastroparesis     Papillary carcinoma of thyroid (H)     Alopecia     Pulmonary nodule     Esophageal reflux     Dermatitis     Acne rosacea     Diabetic gastroparesis (H)     Poliomyelitis     Left knee  pain     Carotid stenosis     Right shoulder pain     Type 2 diabetes mellitus with other specified complication (H)     Insufficiency, arterial, peripheral (H)     Allergic dermatitis due to other chemical product     Normocytic anemia     Morbid obesity (H)     Mild major depression (H)     CKD (chronic kidney disease) stage 3, GFR 30-59 ml/min (H)     Renal artery stenosis (H)     Past Medical History:   Diagnosis Date     Abdominal adhesions 1984, 96,99    s/p lysis     Abdominal adhesions      Acne rosacea      Allergic rhinitis      Allergic rhinitis, cause unspecified      Alopecia      Anemia      CAD (coronary artery disease)      Carotid stenosis      CKD (chronic kidney disease) stage 2, GFR 60-89 ml/min      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)      CPAP (continuous positive airway pressure) dependence      Depression      Diabetic gastroparesis (H)      Diet-controlled type 2 diabetes mellitus (H)      DM2 (diabetes mellitus, type 2) (H)      DVT (deep venous thrombosis) (H)      DVT of axillary vein, acute right (H)      Fibromyalgia      Gastro-oesophageal reflux disease      GERD (gastroesophageal reflux disease)      Hernia of unspecified site of abdominal cavity without mention of obstruction or gangrene     bilateral     History of blood transfusion     10/ 1980     HTN (hypertension)      HTN (hypertension)      Hypertriglyceridemia      Hypertriglyceridemia      Hypokalemia      Hypothyroidism      Obstructive sleep apnea      ANNETTE (obstructive sleep apnea)      ANNETTE on CPAP      Osteoarthritis of glenohumeral joint      Papillary carcinoma of thyroid (H)     s/p thyroidectomy - Ruegemer     Papillary carcinoma of thyroid (H)      PE (pulmonary embolism)      Poliomyelitis     poor circulation right leg     Poliomyelitis      Postsurgical hypothyroidism     s/p papillary thryoid cancer - Ruegemer     Pulmonary embolism (H)      Pulmonary embolus (H)      Pulmonary nodule      Rosacea      S/P carpal tunnel  release     bilateral     S/P hardware removal 01/2014    still with lingering foot pain     S/P shoulder surgery     bilateral     Septic joint (H)     right knee     Septic joint of right knee joint (H)      Venous insufficiency      Venous insufficiency      Venous thrombosis 1999    right axillary vein       CC Shola Benoit MD  [address]  [place] [address] on close of this encounter.

## 2022-04-18 NOTE — LETTER
4/18/2022       RE: Chasity Hodgson  [address] [address] [address]     Dear Colleague,    Thank you for referring your patient, Chasity Hodgson, to the [facility] [place] at [facility]. Please see a copy of my visit note below.    [facility] Dermatology Note  Encounter Date: Apr 18, 2022  Telephone ([phone]). Location of teledermatologist: [facility] [place]. Start time: 12:46 PM. End time: 12:54 PM.    Dermatology Problem List:  # Female pattern hair loss  - Current tx: laser comb up to 3x weekly (switching to band 8/2021), free and clear shampoo, fluocinolone acetonide 0.01% oil once per week  - Previously on 1/2 tab of minoxidil 2.5 mg daily, discontinued secondary to elevated creatinine 10/2020, has since stabilized  -Baseline Hair Metrix 8/16/2021   # Tinea corporis or candida/yeast infection - resolved  - Nystatin powder, OTC lotrimin cream   #. Rosacea - stable  - Vanicream  # Venous stasis dermatitis - stable  - Vanicream  # Allergic contact dermatitis - stable  - Uses fragrance-free products  - Patch testing revealed mild reaction to Balsam of [place], fragrance mix, and a strong reaction to paraphenylenediamine.   # Xerosis cutis  -Recommend continuing use of a gentle, fragrance-free emollient based moisturizer such as Vanicream or CeraVe.   # Abrasion , right upper arm 02/15/2021.   - Monitor, photo taken today.   # Milia, right lateral eye  - Removed 8/16/2021   - prior tx: tretinoin    ____________________________________________    Assessment & Plan:     # Female pattern hair loss- stable.  # Hx of allergic contact dermatitis of scalp  Hair loss is overall stable but has undergone some stressful health events recently. Will continue with the current plan.   - Restart fluocinolone 0.01% oil once per week for scalp pruritus and dryness.  - Continue to use laser headband or  comb at least 3 times per week.  - Continue to use of Free & Clear shampoo.    Procedures Performed:    None    Follow-up: 6 month(s) in-person, or earlier for new or changing lesions    Staff and Scribe:     Scribe Disclosure:  I, Janis Artur, am serving as a scribe to document services personally performed by Renetta Meyer MD based on data collection and the provider's statements to me.     Provider Disclosure:   The documentation recorded by the scribe accurately reflects the services I personally performed and the decisions made by me.    Renetta Meyer MD  Professor and Chair  Department of Dermatology  [facility]: Phone: [phone], Fax:[phone]  [facility]: Phone: [phone], Fax: [phone]      ____________________________________________    CC: Hair Loss (No change)    HPI:  Ms. Chasity Hodgson is a(n) 76 year old female who presents today as a return patient for hair loss and scalp dermatitis. Last seen by myself on 8/16/21 at which point she was started on fluocinolone 0.01% oil once weekly for itching and continued on Free and Clear shampoo as well as LLLT for female pattern hair loss.     Today, the patient states that her hair loss in stable. She is using LLLT 3x weekly, but recently stopped fluocinolone oil due to other stressors listed below. She would like to restart the oil. She is avoiding known allergens.     She recently underwent treatment (3 weeks of clindamycin) for a MRSA infection of her L finger. L shoulder and hip replaced in 2020, but she has redeveloped pain in these areas. Patient is otherwise feeling well, without additional skin concerns.    Labs Reviewed:  N/A    Physical Exam:  Vitals: There were no vitals taken for this visit.  SKIN: Teledermatology photos were reviewed; image quality and interpretability: acceptable. Image date: 4/18/22.  -  Frontal, R, and L temple scalp appears stable to improved from previously.   - No other lesions of concern on areas examined.     Medications:  Current Outpatient Medications   Medication     albuterol (PROAIR HFA/PROVENTIL HFA/VENTOLIN HFA) 108 (90 Base) MCG/ACT inhaler     amLODIPine (NORVASC) 2.5 MG tablet     aspirin (ASA) 81 MG EC tablet     atenolol (TENORMIN) 50 MG tablet     azelastine (ASTEPRO) 0.15 % nasal spray     Cholecalciferol (VITAMIN D-3 PO)     clotrimazole (LOTRIMIN) 1 % cream     Continuous Blood Gluc Sensor (FREESTYLE BRANDY 14 DAY SENSOR) MISC     diphenhydrAMINE-acetaminophen (TYLENOL PM)  MG tablet     ezetimibe (ZETIA) 10 MG tablet     famotidine (PEPCID) 20 MG tablet     fenofibrate (TRICOR) 145 MG tablet     ferrous sulfate (IRON) 325 (65 FE) MG tablet     fexofenadine (ALLEGRA) 180 MG tablet     fluocinolone acetonide (DERMA SMOOTHE/FS BODY) 0.01 % external oil     fluticasone (FLONASE) 50 MCG/ACT spray     furosemide (LASIX) 20 MG tablet     gabapentin (NEURONTIN) 100 MG capsule     lisinopril (ZESTRIL) 40 MG tablet     MULTIVITAMINS OR TABS     nitroGLYcerin (NITROSTAT) 0.4 MG sublingual tablet     nystatin (MYCOSTATIN) cream     nystatin-triamcinolone (MYCOLOG II) cream     ondansetron (ZOFRAN) 4 MG tablet     order for DME     order for DME     pantoprazole (PROTONIX) 40 MG EC tablet     sitagliptin (JANUVIA) 50 MG tablet     SYNTHROID 112 MCG tablet     TRESIBA FLEXTOUCH 100 UNIT/ML pen     tretinoin (RETIN-A) 0.025 % external cream     No current facility-administered medications for this visit.      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)     Anemia due to blood loss, acute     ACP  (advance care planning)     Postsurgical hypothyroidism     Type 2 diabetes, HbA1c goal < 7% (H)     Gastroparesis     Papillary carcinoma of thyroid (H)     Alopecia     Pulmonary nodule     Esophageal reflux     Dermatitis     Acne rosacea     Diabetic gastroparesis (H)     Poliomyelitis     Left knee pain     Carotid stenosis     Right shoulder pain     Type 2 diabetes mellitus with other specified complication (H)     Insufficiency, arterial, peripheral (H)     Allergic dermatitis due to other chemical product     Normocytic anemia     Morbid obesity (H)     Mild major depression (H)     CKD (chronic kidney disease) stage 3, GFR 30-59 ml/min (H)     Renal artery stenosis (H)     Past Medical History:   Diagnosis Date     Abdominal adhesions 1984, 96,99    s/p lysis     Abdominal adhesions      Acne rosacea      Allergic rhinitis      Allergic rhinitis, cause unspecified      Alopecia      Anemia      CAD (coronary artery disease)      Carotid stenosis      CKD (chronic kidney disease) stage 2, GFR 60-89 ml/min      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)      CPAP (continuous positive airway pressure) dependence      Depression      Diabetic gastroparesis (H)      Diet-controlled type 2 diabetes mellitus (H)      DM2 (diabetes mellitus, type 2) (H)      DVT (deep venous thrombosis) (H)      DVT of axillary vein, acute right (H)      Fibromyalgia      Gastro-oesophageal reflux disease      GERD (gastroesophageal reflux disease)      Hernia of unspecified site of abdominal cavity without mention of obstruction or gangrene     bilateral     History of blood transfusion     10/ 1980     HTN (hypertension)      HTN (hypertension)      Hypertriglyceridemia      Hypertriglyceridemia      Hypokalemia      Hypothyroidism      Obstructive sleep apnea      ANNETTE (obstructive sleep apnea)      ANNETTE on CPAP      Osteoarthritis of glenohumeral joint      Papillary carcinoma of thyroid (H)     s/p thyroidectomy - Ruegemer     Papillary carcinoma  of thyroid (H)      PE (pulmonary embolism)      Poliomyelitis     poor circulation right leg     Poliomyelitis      Postsurgical hypothyroidism     s/p papillary thryoid cancer - Ruegemer     Pulmonary embolism (H)      Pulmonary embolus (H)      Pulmonary nodule      Rosacea      S/P carpal tunnel release     bilateral     S/P hardware removal 01/2014    still with lingering foot pain     S/P shoulder surgery     bilateral     Septic joint (H)     right knee     Septic joint of right knee joint (H)      Venous insufficiency      Venous insufficiency      Venous thrombosis 1999    right axillary vein       CC Shola Benoit MD  [address]  [place] [address] on close of this encounter.

## 2022-04-20 ENCOUNTER — TRANSFERRED RECORDS (OUTPATIENT)
Dept: HEALTH INFORMATION MANAGEMENT | Facility: CLINIC | Age: 77
End: 2022-04-20
Payer: MEDICARE

## 2022-05-04 ENCOUNTER — TRANSFERRED RECORDS (OUTPATIENT)
Dept: HEALTH INFORMATION MANAGEMENT | Facility: CLINIC | Age: 77
End: 2022-05-04
Payer: MEDICARE

## 2022-05-06 ENCOUNTER — HOSPITAL ENCOUNTER (OUTPATIENT)
Dept: CT IMAGING | Facility: CLINIC | Age: 77
Discharge: HOME | End: 2022-05-06
Attending: INTERNAL MEDICINE | Admitting: INTERNAL MEDICINE
Payer: MEDICARE

## 2022-05-06 ENCOUNTER — TRANSFERRED RECORDS (OUTPATIENT)
Dept: HEALTH INFORMATION MANAGEMENT | Facility: CLINIC | Age: 77
End: 2022-05-06

## 2022-05-06 DIAGNOSIS — R14.0 DISTENDED ABDOMEN: ICD-10-CM

## 2022-05-06 DIAGNOSIS — R10.9 ABDOMINAL DISCOMFORT: ICD-10-CM

## 2022-05-06 LAB
CREAT BLD-MCNC: 1.3 MG/DL (ref 0.5–1)
GFR SERPL CREATININE-BSD FRML MDRD: 43 ML/MIN/1.73M2

## 2022-05-06 PROCEDURE — 250N000011 HC RX IP 250 OP 636: Performed by: RADIOLOGY

## 2022-05-06 PROCEDURE — 82565 ASSAY OF CREATININE: CPT

## 2022-05-06 PROCEDURE — 74177 CT ABD & PELVIS W/CONTRAST: CPT

## 2022-05-06 PROCEDURE — 250N000009 HC RX 250: Performed by: RADIOLOGY

## 2022-05-06 RX ORDER — IOPAMIDOL 755 MG/ML
500 INJECTION, SOLUTION INTRAVASCULAR ONCE
Status: COMPLETED | OUTPATIENT
Start: 2022-05-06 | End: 2022-05-06

## 2022-05-06 RX ADMIN — SODIUM CHLORIDE 65 ML: 9 INJECTION, SOLUTION INTRAVENOUS at 14:15

## 2022-05-06 RX ADMIN — IOPAMIDOL 100 ML: 755 INJECTION, SOLUTION INTRAVENOUS at 14:15

## 2022-05-08 ASSESSMENT — ENCOUNTER SYMPTOMS
JAUNDICE: 0
LEG PAIN: 1
TREMORS: 0
POLYPHAGIA: 0
HYPERTENSION: 1
SEIZURES: 0
NUMBNESS: 0
SYNCOPE: 0
BLOOD IN STOOL: 0
SLEEP DISTURBANCES DUE TO BREATHING: 0
SINUS PAIN: 0
EYE IRRITATION: 1
ORTHOPNEA: 0
FLANK PAIN: 1
RECTAL PAIN: 0
HEADACHES: 0
TASTE DISTURBANCE: 0
STIFFNESS: 0
DIARRHEA: 1
DIZZINESS: 1
INCREASED ENERGY: 1
HEMATURIA: 0
LOSS OF CONSCIOUSNESS: 0
DISTURBANCES IN COORDINATION: 0
WEIGHT GAIN: 0
DIFFICULTY URINATING: 1
POOR WOUND HEALING: 0
ABDOMINAL PAIN: 1
ARTHRALGIAS: 1
SMELL DISTURBANCE: 0
ALTERED TEMPERATURE REGULATION: 1
EYE REDNESS: 0
NAUSEA: 1
MUSCLE CRAMPS: 1
CHILLS: 1
CONSTIPATION: 0
SINUS CONGESTION: 0
NECK MASS: 1
DYSURIA: 0
EYE PAIN: 0
SKIN CHANGES: 0
BOWEL INCONTINENCE: 0
HEARTBURN: 1
MYALGIAS: 1
DECREASED APPETITE: 1
MEMORY LOSS: 0
SORE THROAT: 0
FEVER: 0
DOUBLE VISION: 0
POLYDIPSIA: 1
EYE WATERING: 0
VOMITING: 0
TROUBLE SWALLOWING: 0
HALLUCINATIONS: 0
NAIL CHANGES: 1
LIGHT-HEADEDNESS: 1
SPEECH CHANGE: 0
PARALYSIS: 0
TINGLING: 0
FATIGUE: 1
NIGHT SWEATS: 1
BLOATING: 1
MUSCLE WEAKNESS: 1
EXERCISE INTOLERANCE: 0
NECK PAIN: 1
WEAKNESS: 1
HYPOTENSION: 0
HOARSE VOICE: 0
PALPITATIONS: 0
BACK PAIN: 1
JOINT SWELLING: 0
WEIGHT LOSS: 0

## 2022-05-13 ENCOUNTER — OFFICE VISIT (OUTPATIENT)
Dept: ENDOCRINOLOGY | Facility: CLINIC | Age: 77
End: 2022-05-13
Payer: MEDICARE

## 2022-05-13 VITALS
WEIGHT: 205 LBS | HEART RATE: 65 BPM | DIASTOLIC BLOOD PRESSURE: 70 MMHG | BODY MASS INDEX: 40.04 KG/M2 | SYSTOLIC BLOOD PRESSURE: 146 MMHG

## 2022-05-13 DIAGNOSIS — E11.9 TYPE 2 DIABETES, HBA1C GOAL < 7% (H): Primary | ICD-10-CM

## 2022-05-13 PROCEDURE — 99215 OFFICE O/P EST HI 40 MIN: CPT | Performed by: INTERNAL MEDICINE

## 2022-05-13 RX ORDER — GLIPIZIDE 5 MG/1
5 TABLET, FILM COATED, EXTENDED RELEASE ORAL DAILY
Qty: 90 TABLET | Refills: 3 | Status: SHIPPED | OUTPATIENT
Start: 2022-05-13 | End: 2022-11-30

## 2022-05-13 RX ORDER — GLIPIZIDE 10 MG/1
10 TABLET, FILM COATED, EXTENDED RELEASE ORAL DAILY
Qty: 90 TABLET | Refills: 3 | OUTPATIENT
Start: 2022-05-13 | End: 2022-05-13

## 2022-05-13 RX ORDER — INSULIN DEGLUDEC 100 U/ML
32 INJECTION, SOLUTION SUBCUTANEOUS EVERY MORNING
Qty: 5 ML | Refills: 11 | Status: SHIPPED | OUTPATIENT
Start: 2022-05-13 | End: 2022-11-30

## 2022-05-13 NOTE — PROGRESS NOTES
Chasity Hodgson is a 76 year old yo female here for follow-up of Diabetes Mellitus, and PTC/post surgical hypothyrodisim. She also has past medical istory of ANNETTE, gastroparesis, obesity,  Last seen by me Jan 5/2022    INTERVAL HISTORY:  - MRSA infection in finger, on clindamycin  - Now having knee pain, hip pain, shoulder pain, trouble walking, received cortisone injection yesterday with some hyperglycemia      1) Diabetes Mellitus    Diabetes History:  Diagnosis: 2015, picked up on routine labs  Hospitalizations: None  Previous Regimens: Farxia (difficulty with frequent urination), Toujeo (was working well but insurance formulary changed 1/1/2021) At highest was at 48u daily, and then had profound low. At time of discontinuing, was down to 5u of toujeo. Previously on metformin.   Current Regimen: Tresiba 27u daily, Sitagliptin (Januiva) 50mg daily, if BG <150 has been taking 14u    BG check- Freestyle Vini SMBG  Trends-   Overall high-- Ave       Complications: Gastroparesis- previously on reglan,     Last eye exam: Nov 22, 2021- no retinopathy, has cataracts  Foot Exam: [facility], checks every 60 days   Blood pressure- Lisinopril 40mg daily, Atenolol 50mg daily  Lipids- ezetimibe 10 mg daily, fenofibrate 145mg daily  SIOBHAN last 7/7- 25.52    24hr recall:  Brunch- Eggs, English Muffin  Sncak- fruit or sugar free jello  Dinner- air fried cod with steamed mixed vegetables, raspberries w/ brown sugar    2) Hypothyroidism s/p thyroidectomy for PTC  Diagnosis: known 3 nodules that were being monitored, and in 2002 s/p thyroidectomy (nodule in isthmus was cancer), s/p BUTLER (per patient, unclear if clean margins so received BUTLER, unknown dose)  Treatment: Levothyroxine 112mcg  Residual tissue on R that has been monitored, not growing.   Last ultrasound 10/2021- no tissue seen  last biomarkers 10/7/2021- TG <0.1, TGAb <0.4         3) Vitamin D Deficiency  - Taking 2 tab daily  - Most recent 7/7/2021- 74  - PTH  9 (9/16/2021)    BP Readings from Last 3 Encounters:   05/13/22 (!) 146/70   03/31/22 (!) 146/70   02/20/22 (!) 179/71       Lab Results   Component Value Date    A1C 9.6 01/11/2022    A1C 9.5 10/07/2021    A1C 9.0 08/02/2021    A1C 8.7 07/07/2021    A1C 6.2 11/27/2020    A1C 6.8 09/10/2019       Recent Labs   Lab Test 01/11/22  1359 03/29/19  1355 08/01/17  0000 02/15/17  0000 02/11/16  0000 03/31/15  1327   CHOL 177 196   < > 142   < > 158   HDL 34* 27*   < > 31   < > 34*   LDL 81 99   < > 49   < > 51   TRIG 308* 349*   < > 309   < > 365*   CHOLHDLRATIO  --   --   --  4.6  --  4.6    < > = values in this interval not displayed.       Lab Results   Component Value Date    MICROL 36 01/11/2022    MICROL 23 07/07/2021     No results found for: MICROALBUMIN      Wt Readings from Last 3 Encounters:   05/13/22 93 kg (205 lb)   03/31/22 91.6 kg (202 lb)   01/05/22 91.6 kg (202 lb)       Current Outpatient Medications   Medication Sig Dispense Refill     amLODIPine (NORVASC) 2.5 MG tablet Take 2.5mg in AM, 2.5mg at noon, and 5mg at bedtime 360 tablet 3     aspirin (ASA) 81 MG EC tablet Take 81 mg by mouth daily       atenolol (TENORMIN) 50 MG tablet TAKE ONE TABLET BY MOUTH TWICE A  tablet 2     azelastine (ASTEPRO) 0.15 % nasal spray 1 spray       Cholecalciferol (VITAMIN D-3 PO) Take 2 tablets by mouth       clotrimazole (LOTRIMIN) 1 % cream Apply topically daily       Continuous Blood Gluc Sensor (FREESTYLE BRANDY 14 DAY SENSOR) MISC Apply 1 sensor and change every 14 days. 2 each 11     ezetimibe (ZETIA) 10 MG tablet TAKE ONE TABLET BY MOUTH ONCE DAILY 90 tablet 3     famotidine (PEPCID) 20 MG tablet Take 2 tablets (40 mg) by mouth as needed 180 tablet 3     fenofibrate (TRICOR) 145 MG tablet TAKE ONE TABLET BY MOUTH ONCE DAILY 90 tablet 3     ferrous sulfate (IRON) 325 (65 FE) MG tablet Take 325 mg by mouth daily (with breakfast)       fexofenadine (ALLEGRA) 180 MG tablet Take 180 mg by mouth daily. 120 0      fluocinolone acetonide (DERMA SMOOTHE/FS BODY) 0.01 % external oil Apply 2 mL to scalp once per week. Massage into scalp. Can be left in overnight or washed out after 4-6 hours. 118.28 mL 5     fluticasone (FLONASE) 50 MCG/ACT spray Spray 2 sprays in nostril daily 2 sprays in each nostril qd 1 Bottle 0     furosemide (LASIX) 20 MG tablet Take 1 tablet (20 mg) by mouth daily 90 tablet 1     gabapentin (NEURONTIN) 100 MG capsule Take 1 capsule (100 mg) by mouth every evening as needed 90 capsule 3     glipiZIDE (GLUCOTROL XL) 5 MG 24 hr tablet Take 1 tablet (5 mg) by mouth daily 90 tablet 3     lisinopril (ZESTRIL) 40 MG tablet TAKE ONE TABLET BY MOUTH ONCE DAILY 90 tablet 3     MULTIVITAMINS OR TABS ONE DAILY 100 3     nystatin (MYCOSTATIN) cream Apply topically daily as needed        nystatin-triamcinolone (MYCOLOG II) cream Apply topically daily as needed        ondansetron (ZOFRAN) 4 MG tablet Take 1 tablet by mouth every 6 hours as needed Reported on 3/20/2017       pantoprazole (PROTONIX) 40 MG EC tablet Take 40 mg by mouth 2 times daily       sitagliptin (JANUVIA) 50 MG tablet Take 1 tablet (50 mg) by mouth daily 90 tablet 3     SYNTHROID 112 MCG tablet Take 1 tablet (112 mcg) by mouth daily Take 112 mcg daily except for Friday takes only 56 mcg.  Brand name Synthroid 90 tablet 3     TRESIBA FLEXTOUCH 100 UNIT/ML pen Inject 32 Units Subcutaneous every morning 5 mL 11     tretinoin (RETIN-A) 0.025 % external cream Use every night as tolerated - spot treat lesion 20 g 3     diphenhydrAMINE-acetaminophen (TYLENOL PM)  MG tablet Take 1 tablet by mouth At Bedtime Reported on 3/20/2017 (Patient not taking: Reported on 5/13/2022)       nitroGLYcerin (NITROSTAT) 0.4 MG sublingual tablet Place 1 tablet (0.4 mg) under the tongue every 5 minutes as needed for chest pain (no more than 3 in one hour; after 3rd, call 911.) (Patient not taking: Reported on 5/13/2022) 25 tablet 3     order for DME Equipment being  ordered: Compression stockings - Knee High; 20-30 mmHg compression - note would like adhesive band to keep the stocking from sliding down 3 each 0     order for DME Equipment being ordered: Oral appliance for sleep apnea 1 Units 0       Histories reviewed and updated in Epic.  Past Medical History:   Diagnosis Date     Abdominal adhesions 1984, 96,99    s/p lysis     Abdominal adhesions      Acne rosacea      Allergic rhinitis      Allergic rhinitis, cause unspecified      Alopecia      Anemia      CAD (coronary artery disease)      Carotid stenosis      CKD (chronic kidney disease) stage 2, GFR 60-89 ml/min      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)      CPAP (continuous positive airway pressure) dependence      Depression      Diabetic gastroparesis (H)      Diet-controlled type 2 diabetes mellitus (H)      DM2 (diabetes mellitus, type 2) (H)      DVT (deep venous thrombosis) (H)      DVT of axillary vein, acute right (H)      Fibromyalgia      Gastro-oesophageal reflux disease      GERD (gastroesophageal reflux disease)      Hernia of unspecified site of abdominal cavity without mention of obstruction or gangrene     bilateral     History of blood transfusion     10/ 1980     HTN (hypertension)      HTN (hypertension)      Hypertriglyceridemia      Hypertriglyceridemia      Hypokalemia      Hypothyroidism      Obstructive sleep apnea      ANNETTE (obstructive sleep apnea)      ANNETTE on CPAP      Osteoarthritis of glenohumeral joint      Papillary carcinoma of thyroid (H)     s/p thyroidectomy - Ruegemer     Papillary carcinoma of thyroid (H)      PE (pulmonary embolism)      Poliomyelitis     poor circulation right leg     Poliomyelitis      Postsurgical hypothyroidism     s/p papillary thryoid cancer - Ruegemer     Pulmonary embolism (H)      Pulmonary embolus (H)      Pulmonary nodule      Rosacea      S/P carpal tunnel release     bilateral     S/P hardware removal 01/2014    still with lingering foot pain     S/P shoulder surgery      bilateral     Septic joint (H)     right knee     Septic joint of right knee joint (H)      Venous insufficiency      Venous insufficiency      Venous thrombosis 1999    right axillary vein       Past Surgical History:   Procedure Laterality Date     AMPUTATE TOE(S)  3/15/2012    Procedure:AMPUTATE TOE(S); Surgeon:RUBEN MOSES; Location: OR     AMPUTATE TOE(S)       APPENDECTOMY  1972     APPENDECTOMY       ARTHRODESIS FOOT  3/15/2012    Procedure:ARTHRODESIS FOOT; RIGHT TRIPLE ARTHRODESIS, FIFTH TOE AMPUTATION, LATERAL LIGAMENT RECONSTRUCTION, TENDON TRANSFER AND RELEASE [MINI C-ARM, ARTHREX 4.5 AND 6.7 STAPLES, BIOCOMPOSITE TENODESIS SCREWS]; Surgeon:RUBEN MOSES; Location: OR     ARTHRODESIS FOOT Right     Right foot triple arthrodesis and removal of hardware     ARTHROSCOPY SHOULDER  06/25/2015    REVISION SUBACROMIAL DECOMPRESSION, EXCISION OF GANGLION CYST, DEBRIDEMENT AND EXCISION OF THE GLENOHUMERAL JOINT GANGLION CYST, CORACOID DECOMPRESSION, POSSIBLE SUBSCAPULARIS REPAIR AND OPEN SUBSCAPULARIS BICEP     ARTHROSCOPY SHOULDER ROTATOR CUFF REPAIR       BIOPSY  Thyroid 2002     BREAST SURGERY  Biopsy     CHOLECYSTECTOMY       CHOLECYSTECTOMY       COLONOSCOPY  2018     COLOSTOMY  2/7/2012    Procedure:COLOSTOMY; CREATION OF SIGMOID COLOSTOMY AND EXTENSIVE  LYSIS OF ADHESIONS; Surgeon:MONTSERRAT BENDER P; Location: OR     COLOSTOMY       EYE SURGERY       GENITOURINARY SURGERY  1999     GI SURGERY      weakened rectal sphincter with artificial stimulator     HERNIA REPAIR  1976     HYSTERECTOMY TOTAL ABDOMINAL       LAPAROTOMY, LYSIS ADHESIONS, COMBINED  2/7/2012    Procedure:COMBINED LAPAROTOMY, LYSIS ADHESIONS; Surgeon:MONTSERRAT BENDER P; Location: OR     RELEASE CARPAL TUNNEL       RELEASE TENDON FOOT  3/15/2012    Procedure:RELEASE TENDON FOOT; Surgeon:RUBEN MOSES; Location: OR     REMOVE HARDWARE FOOT  12/13/2012    Procedure: REMOVE HARDWARE FOOT;  RIGHT  FOOT REMOVAL OF HARDWARE;  Surgeon: Sukhdeep Metz MD;  Location: [facility] OR     SHOULDER SURGERY  11/12/2020    LEFT SHOULDER HEMIARHTROPLASTY, BICEP TENODESIS     SOFT TISSUE SURGERY  2018, 2020     TENDON RELEASE      foot     THYROIDECTOMY       ZZC FREEING BOWEL ADHESION,ENTEROLYSIS      1986, 1996, 1999     ZZC NONSPECIFIC PROCEDURE      throidectomy     ZZC TOTAL ABDOM HYSTERECTOMY  1980    + BSO       Allergies:  Nsaids, Toradol, Celecoxib, Codeine, Crestor [rosuvastatin], No clinical screening - see comments, Vioxx, Conjugated estrogens, and Sulfa drugs    Social History     Tobacco Use     Smoking status: Never Smoker     Smokeless tobacco: Never Used   Substance Use Topics     Alcohol use: Not Currently     Alcohol/week: 0.0 standard drinks       Family History   Problem Relation Age of Onset     Arthritis Mother      Hypertension Mother      Cerebrovascular Disease Mother      Obesity Mother      Heart Disease Mother         MI's     Hypertension Father      Respiratory Father         Adult RDS     Diabetes Father         adult     Arthritis Sister      Cancer Sister      Diabetes Sister      Hypertension Sister      Breast Cancer Sister      Depression Sister      Thyroid Disease Sister      Obesity Sister      Arthritis Sister      Hypertension Sister      Thyroid Disease Sister      Cancer Sister         colon polup     Heart Disease Brother         MI at 54     Other Cancer Brother      Hypertension Sister      Osteoporosis Sister      Obesity Sister      Lipids Brother      Hypertension Brother      Diabetes Brother      Hyperlipidemia Brother      Lipids Sister      Obesity Sister      Obesity Maternal Grandmother      Skin Cancer Maternal Grandmother         skin cancer unknown     Cancer Maternal Grandmother         unknown skin cancer on face     Obesity Paternal Grandmother           REVIEW OF SYSTEMS:   ROS: 10 point ROS neg other than the symptoms noted above in the  HPI.      EXAM:  Vitals: BP (!) 146/70   Pulse 65   Wt 93 kg (205 lb)   BMI 40.04 kg/m      BMIE= Body mass index is 40.04 kg/m .  Exam:  Constitutional: healthy, alert, no acute distress  Head: Normocephalic. No masses, lesions, no exophthalmos/proptosis  ENT: no visible goiter  Respiratory: nonlabored  Gastrointestinal: Abdomen soft, non-tender.  Musculoskeletal: extremities normal- no gross deformities noted, gait normal and normal muscle tone  Skin: no suspicious lesions or rashes  Neurologic: Gait normal. sensation grossly intact  Psychiatric: mentation appears normal, calm    ASSESSMENT/PLAN:  No diagnosis found.  No orders of the defined types were placed in this encounter.      1) Diabetes Mellitus   - Glucose Control- NOT at goal,   - Increase Tresiba 32u every morning   - Start Glipizide 5mg XR (given renal insufficiency), can increase to 10-- limited choices-- urniary frequency with SGLT-2, gastroparesis so no GLP-1, and GFR 43 so hesitant to start metformin at this time point.    - Continue Januvia   - continue freestyle daryn for testing  - Cardiovascular Risk- continue ezetimibe, fenofibrate, myalgias with statin (can consider restarting at lower dose at next visit)- check lipid panel now  - Ophthalmology- uptodate, instructed to return Nov 2022, no NPDR.  - Podiatry- patient instructed on routine foot care, follows with podiatry  - Renal- Uptodate, SIOBHAN now continue lisinopril    2) Cardiovascular Risk-   - Continue ezetimibe, fenofibrate. Consider discussion of statin at next visit.  - Continue aspirin 81mg daily     3) Hypothyroidism, postsurgical  - TSH/FT4 at goal  - Continue LT4 112mcg     4) History of Papillary Thyroid [place]  - U/S and TG/TGab show biochemically and structurally complete response  - Now 20 years out, recheck in 5 years (2026).    RTC- 3 months    A total of 40 minutes were spent today 05/13/22 on this visit including chart review, history and counseling, documentation and  other activities as detailed above.       Answers for HPI/ROS submitted by the patient on 5/8/2022  General Symptoms: Yes  Skin Symptoms: Yes  HENT Symptoms: Yes  EYE SYMPTOMS: Yes  HEART SYMPTOMS: Yes  LUNG SYMPTOMS: No  INTESTINAL SYMPTOMS: Yes  URINARY SYMPTOMS: Yes  GYNECOLOGIC SYMPTOMS: No  BREAST SYMPTOMS: No  SKELETAL SYMPTOMS: Yes  BLOOD SYMPTOMS: No  NERVOUS SYSTEM SYMPTOMS: Yes  MENTAL HEALTH SYMPTOMS: No  Ear pain: No  Ear discharge: No  Hearing loss: No  Tinnitus: Yes  Nosebleeds: No  Congestion: No  Sinus pain: No  Trouble swallowing: No   Voice hoarseness: No  Mouth sores: Yes  Sore throat: No  Tooth pain: Yes  Gum tenderness: Yes  Bleeding gums: No  Change in taste: No  Change in sense of smell: No  Dry mouth: Yes  Hearing aid used: No  Neck lump: Yes  Fever: No  Loss of appetite: Yes  Weight loss: No  Weight gain: No  Fatigue: Yes  Night sweats: Yes  Chills: Yes  Increased stress: Yes  Excessive hunger: No  Excessive thirst: Yes  Feeling hot or cold when others believe the temperature is normal: Yes  Loss of height: Yes  Post-operative complications: No  Surgical site pain: No  Hallucinations: No  Change in or Loss of Energy: Yes  Hyperactivity: No  Confusion: No  Changes in hair: Yes  Changes in moles/birth marks: No  Itching: Yes  Rashes: Yes  Changes in nails: Yes  Acne: No  Hair in places you don't want it: No  Change in facial hair: No  Warts: No  Non-healing sores: No  Scarring: No  Flaking of skin: Yes  Color changes of hands/feet in cold : No  Sun sensitivity: No  Skin thickening: No  Eye pain: No  Vision loss: No  Dry eyes: Yes  Watery eyes: No  Eye bulging: No  Double vision: No  Flashing of lights: No  Spots: Yes  Floaters: Yes  Redness: No  Crossed eyes: No  Tunnel Vision: No  Yellowing of eyes: No  Eye irritation: Yes  Chest pain or pressure: Yes  Fast or irregular heartbeat: No  Pain in legs with walking: Yes  Trouble breathing while lying down: No  Fingers or toes appear blue: No  High  blood pressure: Yes  Low blood pressure: No  Fainting: No  Murmurs: No  Pacemaker: No  Varicose veins: No  Edema or swelling: Yes  Wake up at night with shortness of breath: No  Light-headedness: Yes  Exercise intolerance: No  Heart burn or indigestion: Yes  Nausea: Yes  Vomiting: No  Abdominal pain: Yes  Bloating: Yes  Constipation: No  Diarrhea: Yes  Blood in stool: No  Black stools: No  Rectal or Anal pain: No  Fecal incontinence: No  Yellowing of skin or eyes: No  Vomit with blood: No  Change in stools: No  Trouble holding urine or incontinence: Yes  Pain or burning: No  Trouble starting or stopping: Yes  Increased frequency of urination: Yes  Blood in urine: No  Decreased frequency of urination: No  Frequent nighttime urination: No  Flank pain: Yes  Difficulty emptying bladder: Yes  Back pain: Yes  Muscle aches: Yes  Neck pain: Yes  Swollen joints: No  Joint pain: Yes  Bone pain: Yes  Muscle cramps: Yes  Muscle weakness: Yes  Joint stiffness: No  Bone fracture: No  Trouble with coordination: No  Dizziness or trouble with balance: Yes  Fainting or black-out spells: No  Memory loss: No  Headache: No  Seizures: No  Speech problems: No  Tingling: No  Tremor: No  Weakness: Yes  Difficulty walking: Yes  Paralysis: No  Numbness: No

## 2022-05-13 NOTE — LETTER
5/13/2022         RE: Chasity Hodgson  [address]  [facility] [address]        Dear Colleague,    Thank you for referring your patient, Chasity Hodgson, to the [facility] [place]. Please see a copy of my visit note below.    Chasity Hodgson is a 76 year old yo female here for follow-up of Diabetes Mellitus, and PTC/post surgical hypothyrodisim. She also has past medical istory of ANNETTE, gastroparesis, obesity,  Last seen by me Jan 5/2022    INTERVAL HISTORY:  - MRSA infection in finger, on clindamycin  - Now having knee pain, hip pain, shoulder pain, trouble walking, received cortisone injection yesterday with some hyperglycemia      1) Diabetes Mellitus    Diabetes History:  Diagnosis: 2015, picked up on routine labs  Hospitalizations: None  Previous Regimens: Farxia (difficulty with frequent urination), Toujeo (was working well but insurance formulary changed 1/1/2021) At highest was at 48u daily, and then had profound low. At time of discontinuing, was down to 5u of toujeo. Previously on metformin.   Current Regimen: Tresiba 27u daily, Sitagliptin (Januiva) 50mg daily, if BG <150 has been taking 14u    BG check- Freestyle Vini SMBG  Trends-   Overall high-- Ave       Complications: Gastroparesis- previously on reglan,     Last eye exam: Nov 22, 2021- no retinopathy, has cataracts  Foot Exam: [facility], checks every 60 days   Blood pressure- Lisinopril 40mg daily, Atenolol 50mg daily  Lipids- ezetimibe 10 mg daily, fenofibrate 145mg daily  SIOBHAN last 7/7- 25.52    24hr recall:  Brunch- Eggs, English Muffin  Sncak- fruit or sugar free jello  Dinner- air fried cod with steamed mixed vegetables, raspberries w/ brown sugar    2) Hypothyroidism s/p thyroidectomy for PTC  Diagnosis: known 3 nodules that were being monitored, and in 2002 s/p thyroidectomy (nodule in isthmus was cancer), s/p BUTLER (per patient, unclear if clean margins so received BUTLER, unknown dose)  Treatment:  Levothyroxine 112mcg  Residual tissue on R that has been monitored, not growing.   Last ultrasound 10/2021- no tissue seen  last biomarkers 10/7/2021- TG <0.1, TGAb <0.4         3) Vitamin D Deficiency  - Taking 2 tab daily  - Most recent 7/7/2021- 73  - PTH 9 (9/16/2021)    BP Readings from Last 3 Encounters:   05/13/22 (!) 146/70   03/31/22 (!) 146/70   02/20/22 (!) 179/71       Lab Results   Component Value Date    A1C 9.6 01/11/2022    A1C 9.5 10/07/2021    A1C 9.0 08/02/2021    A1C 8.7 07/07/2021    A1C 6.2 11/27/2020    A1C 6.8 09/10/2019       Recent Labs   Lab Test 01/11/22  1359 03/29/19  1355 08/01/17  0000 02/15/17  0000 02/11/16  0000 03/31/15  1327   CHOL 177 196   < > 142   < > 158   HDL 34* 27*   < > 31   < > 34*   LDL 81 99   < > 49   < > 51   TRIG 308* 349*   < > 309   < > 365*   CHOLHDLRATIO  --   --   --  4.6  --  4.6    < > = values in this interval not displayed.       Lab Results   Component Value Date    MICROL 36 01/11/2022    MICROL 23 07/07/2021     No results found for: MICROALBUMIN      Wt Readings from Last 3 Encounters:   05/13/22 93 kg (205 lb)   03/31/22 91.6 kg (202 lb)   01/05/22 91.6 kg (202 lb)       Current Outpatient Medications   Medication Sig Dispense Refill     amLODIPine (NORVASC) 2.5 MG tablet Take 2.5mg in AM, 2.5mg at noon, and 5mg at bedtime 360 tablet 3     aspirin (ASA) 81 MG EC tablet Take 81 mg by mouth daily       atenolol (TENORMIN) 50 MG tablet TAKE ONE TABLET BY MOUTH TWICE A  tablet 2     azelastine (ASTEPRO) 0.15 % nasal spray 1 spray       Cholecalciferol (VITAMIN D-3 PO) Take 2 tablets by mouth       clotrimazole (LOTRIMIN) 1 % cream Apply topically daily       Continuous Blood Gluc Sensor (FREESTYLE BRANDY 14 DAY SENSOR) [facility] [place] 1 sensor and change every 14 days. 2 each 11     ezetimibe (ZETIA) 10 MG tablet TAKE ONE TABLET BY MOUTH ONCE DAILY 90 tablet 3     famotidine (PEPCID) 20 MG tablet Take 2 tablets (40 mg) by mouth as needed 180 tablet 3      fenofibrate (TRICOR) 145 MG tablet TAKE ONE TABLET BY MOUTH ONCE DAILY 90 tablet 3     ferrous sulfate (IRON) 325 (65 FE) MG tablet Take 325 mg by mouth daily (with breakfast)       fexofenadine (ALLEGRA) 180 MG tablet Take 180 mg by mouth daily. 120 0     fluocinolone acetonide (DERMA SMOOTHE/FS BODY) 0.01 % external oil Apply 2 mL to scalp once per week. Massage into scalp. Can be left in overnight or washed out after 4-6 hours. 118.28 mL 5     fluticasone (FLONASE) 50 MCG/ACT spray Spray 2 sprays in nostril daily 2 sprays in each nostril qd 1 Bottle 0     furosemide (LASIX) 20 MG tablet Take 1 tablet (20 mg) by mouth daily 90 tablet 1     gabapentin (NEURONTIN) 100 MG capsule Take 1 capsule (100 mg) by mouth every evening as needed 90 capsule 3     glipiZIDE (GLUCOTROL XL) 5 MG 24 hr tablet Take 1 tablet (5 mg) by mouth daily 90 tablet 3     lisinopril (ZESTRIL) 40 MG tablet TAKE ONE TABLET BY MOUTH ONCE DAILY 90 tablet 3     MULTIVITAMINS OR TABS ONE DAILY 100 3     nystatin (MYCOSTATIN) cream Apply topically daily as needed        nystatin-triamcinolone (MYCOLOG II) cream Apply topically daily as needed        ondansetron (ZOFRAN) 4 MG tablet Take 1 tablet by mouth every 6 hours as needed Reported on 3/20/2017       pantoprazole (PROTONIX) 40 MG EC tablet Take 40 mg by mouth 2 times daily       sitagliptin (JANUVIA) 50 MG tablet Take 1 tablet (50 mg) by mouth daily 90 tablet 3     SYNTHROID 112 MCG tablet Take 1 tablet (112 mcg) by mouth daily Take 112 mcg daily except for Friday takes only 56 mcg.  Brand name Synthroid 90 tablet 3     TRESIBA FLEXTOUCH 100 UNIT/ML pen Inject 32 Units Subcutaneous every morning 5 mL 11     tretinoin (RETIN-A) 0.025 % external cream Use every night as tolerated - spot treat lesion 20 g 3     diphenhydrAMINE-acetaminophen (TYLENOL PM)  MG tablet Take 1 tablet by mouth At Bedtime Reported on 3/20/2017 (Patient not taking: Reported on 5/13/2022)       nitroGLYcerin  (NITROSTAT) 0.4 MG sublingual tablet Place 1 tablet (0.4 mg) under the tongue every 5 minutes as needed for chest pain (no more than 3 in one hour; after 3rd, call 911.) (Patient not taking: Reported on 5/13/2022) 25 tablet 3     order for DME Equipment being ordered: Compression stockings - Knee High; 20-30 mmHg compression - note would like adhesive band to keep the stocking from sliding down 3 each 0     order for DME Equipment being ordered: Oral appliance for sleep apnea 1 Units 0       Histories reviewed and updated in Epic.  Past Medical History:   Diagnosis Date     Abdominal adhesions 1984, 96,99    s/p lysis     Abdominal adhesions      Acne rosacea      Allergic rhinitis      Allergic rhinitis, cause unspecified      Alopecia      Anemia      CAD (coronary artery disease)      Carotid stenosis      CKD (chronic kidney disease) stage 2, GFR 60-89 ml/min      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)      CPAP (continuous positive airway pressure) dependence      Depression      Diabetic gastroparesis (H)      Diet-controlled type 2 diabetes mellitus (H)      DM2 (diabetes mellitus, type 2) (H)      DVT (deep venous thrombosis) (H)      DVT of axillary vein, acute right (H)      Fibromyalgia      Gastro-oesophageal reflux disease      GERD (gastroesophageal reflux disease)      Hernia of unspecified site of abdominal cavity without mention of obstruction or gangrene     bilateral     History of blood transfusion     10/ 1980     HTN (hypertension)      HTN (hypertension)      Hypertriglyceridemia      Hypertriglyceridemia      Hypokalemia      Hypothyroidism      Obstructive sleep apnea      ANNETTE (obstructive sleep apnea)      ANNETTE on CPAP      Osteoarthritis of glenohumeral joint      Papillary carcinoma of thyroid (H)     s/p thyroidectomy - Ruegemer     Papillary carcinoma of thyroid (H)      PE (pulmonary embolism)      Poliomyelitis     poor circulation right leg     Poliomyelitis      Postsurgical hypothyroidism     s/p  papillary thryoid cancer - Ruegemer     Pulmonary embolism (H)      Pulmonary embolus (H)      Pulmonary nodule      Rosacea      S/P carpal tunnel release     bilateral     S/P hardware removal 01/2014    still with lingering foot pain     S/P shoulder surgery     bilateral     Septic joint (H)     right knee     Septic joint of right knee joint (H)      Venous insufficiency      Venous insufficiency      Venous thrombosis 1999    right axillary vein       Past Surgical History:   Procedure Laterality Date     AMPUTATE TOE(S)  3/15/2012    Procedure:AMPUTATE TOE(S); Surgeon:RUBEN MOSES; Location: OR     AMPUTATE TOE(S)       APPENDECTOMY  1972     APPENDECTOMY       ARTHRODESIS FOOT  3/15/2012    Procedure:ARTHRODESIS FOOT; RIGHT TRIPLE ARTHRODESIS, FIFTH TOE AMPUTATION, LATERAL LIGAMENT RECONSTRUCTION, TENDON TRANSFER AND RELEASE [MINI C-ARM, ARTHREX 4.5 AND 6.7 STAPLES, BIOCOMPOSITE TENODESIS SCREWS]; Surgeon:RUBEN MOSES; Location: OR     ARTHRODESIS FOOT Right     Right foot triple arthrodesis and removal of hardware     ARTHROSCOPY SHOULDER  06/25/2015    REVISION SUBACROMIAL DECOMPRESSION, EXCISION OF GANGLION CYST, DEBRIDEMENT AND EXCISION OF THE GLENOHUMERAL JOINT GANGLION CYST, CORACOID DECOMPRESSION, POSSIBLE SUBSCAPULARIS REPAIR AND OPEN SUBSCAPULARIS BICEP     ARTHROSCOPY SHOULDER ROTATOR CUFF REPAIR       BIOPSY  Thyroid 2002     BREAST SURGERY  Biopsy     CHOLECYSTECTOMY       CHOLECYSTECTOMY       COLONOSCOPY  2018     COLOSTOMY  2/7/2012    Procedure:COLOSTOMY; CREATION OF SIGMOID COLOSTOMY AND EXTENSIVE  LYSIS OF ADHESIONS; Surgeon:MONTSERRAT BENDER; Location: OR     COLOSTOMY       EYE SURGERY       GENITOURINARY SURGERY  1999     GI SURGERY      weakened rectal sphincter with artificial stimulator     HERNIA REPAIR  1976     HYSTERECTOMY TOTAL ABDOMINAL       LAPAROTOMY, LYSIS ADHESIONS, COMBINED  2/7/2012    Procedure:COMBINED LAPAROTOMY, LYSIS ADHESIONS;  Surgeon:MONTSERRAT BENDER; Location:[facility] OR     RELEASE CARPAL TUNNEL       RELEASE TENDON FOOT  3/15/2012    Procedure:RELEASE TENDON FOOT; Surgeon:SUKHDEEP METZ; Location:[facility] OR     REMOVE HARDWARE FOOT  12/13/2012    Procedure: REMOVE HARDWARE FOOT;  RIGHT FOOT REMOVAL OF HARDWARE;  Surgeon: Sukhdeep Metz MD;  Location: [facility] OR     SHOULDER SURGERY  11/12/2020    LEFT SHOULDER HEMIARHTROPLASTY, BICEP TENODESIS     SOFT TISSUE SURGERY  2018, 2020     TENDON RELEASE      foot     THYROIDECTOMY       ZZC FREEING BOWEL ADHESION,ENTEROLYSIS      1986, 1996, 1999     ZZC NONSPECIFIC PROCEDURE      throidectomy     ZZC TOTAL ABDOM HYSTERECTOMY  1980    + BSO       Allergies:  Nsaids, Toradol, Celecoxib, Codeine, Crestor [rosuvastatin], No clinical screening - see comments, Vioxx, Conjugated estrogens, and Sulfa drugs    Social History     Tobacco Use     Smoking status: Never Smoker     Smokeless tobacco: Never Used   Substance Use Topics     Alcohol use: Not Currently     Alcohol/week: 0.0 standard drinks       Family History   Problem Relation Age of Onset     Arthritis Mother      Hypertension Mother      Cerebrovascular Disease Mother      Obesity Mother      Heart Disease Mother         MI's     Hypertension Father      Respiratory Father         Adult RDS     Diabetes Father         adult     Arthritis Sister      Cancer Sister      Diabetes Sister      Hypertension Sister      Breast Cancer Sister      Depression Sister      Thyroid Disease Sister      Obesity Sister      Arthritis Sister      Hypertension Sister      Thyroid Disease Sister      Cancer Sister         colon polup     Heart Disease Brother         MI at 54     Other Cancer Brother      Hypertension Sister      Osteoporosis Sister      Obesity Sister      Lipids Brother      Hypertension Brother      Diabetes Brother      Hyperlipidemia Brother      Lipids Sister      Obesity Sister      Obesity Maternal Grandmother      Skin  Cancer Maternal Grandmother         skin cancer unknown     Cancer Maternal Grandmother         unknown skin cancer on face     Obesity Paternal Grandmother           REVIEW OF SYSTEMS:   ROS: 10 point ROS neg other than the symptoms noted above in the HPI.      EXAM:  Vitals: BP (!) 146/70   Pulse 65   Wt 93 kg (205 lb)   BMI 40.04 kg/m      BMIE= Body mass index is 40.04 kg/m .  Exam:  Constitutional: healthy, alert, no acute distress  Head: Normocephalic. No masses, lesions, no exophthalmos/proptosis  ENT: no visible goiter  Respiratory: nonlabored  Gastrointestinal: Abdomen soft, non-tender.  Musculoskeletal: extremities normal- no gross deformities noted, gait normal and normal muscle tone  Skin: no suspicious lesions or rashes  Neurologic: Gait normal. sensation grossly intact  Psychiatric: mentation appears normal, calm    ASSESSMENT/PLAN:  No diagnosis found.  No orders of the defined types were placed in this encounter.      1) Diabetes Mellitus   - Glucose Control- NOT at goal,   - Increase Tresiba 32u every morning   - Start Glipizide 5mg XR (given renal insufficiency), can increase to 10-- limited choices-- urniary frequency with SGLT-2, gastroparesis so no GLP-1, and GFR 43 so hesitant to start metformin at this time point.    - Continue Januvia   - continue freestyle daryn for testing  - Cardiovascular Risk- continue ezetimibe, fenofibrate, myalgias with statin (can consider restarting at lower dose at next visit)- check lipid panel now  - Ophthalmology- uptodate, instructed to return Nov 2022, no NPDR.  - Podiatry- patient instructed on routine foot care, follows with podiatry  - Renal- Uptodate, SIOBHAN now continue lisinopril    2) Cardiovascular Risk-   - Continue ezetimibe, fenofibrate. Consider discussion of statin at next visit.  - Continue aspirin 81mg daily     3) Hypothyroidism, postsurgical  - TSH/FT4 at goal  - Continue LT4 112mcg     4) History of Papillary Thyroid [place]  - U/S and TG/TGab  show biochemically and structurally complete response  - Now 20 years out, recheck in 5 years (2026).    RTC- 3 months    A total of 40 minutes were spent today 05/13/22 on this visit including chart review, history and counseling, documentation and other activities as detailed above.       Answers for HPI/ROS submitted by the patient on 5/8/2022  General Symptoms: Yes  Skin Symptoms: Yes  HENT Symptoms: Yes  EYE SYMPTOMS: Yes  HEART SYMPTOMS: Yes  LUNG SYMPTOMS: No  INTESTINAL SYMPTOMS: Yes  URINARY SYMPTOMS: Yes  GYNECOLOGIC SYMPTOMS: No  BREAST SYMPTOMS: No  SKELETAL SYMPTOMS: Yes  BLOOD SYMPTOMS: No  NERVOUS SYSTEM SYMPTOMS: Yes  MENTAL HEALTH SYMPTOMS: No  Ear pain: No  Ear discharge: No  Hearing loss: No  Tinnitus: Yes  Nosebleeds: No  Congestion: No  Sinus pain: No  Trouble swallowing: No   Voice hoarseness: No  Mouth sores: Yes  Sore throat: No  Tooth pain: Yes  Gum tenderness: Yes  Bleeding gums: No  Change in taste: No  Change in sense of smell: No  Dry mouth: Yes  Hearing aid used: No  Neck lump: Yes  Fever: No  Loss of appetite: Yes  Weight loss: No  Weight gain: No  Fatigue: Yes  Night sweats: Yes  Chills: Yes  Increased stress: Yes  Excessive hunger: No  Excessive thirst: Yes  Feeling hot or cold when others believe the temperature is normal: Yes  Loss of height: Yes  Post-operative complications: No  Surgical site pain: No  Hallucinations: No  Change in or Loss of Energy: Yes  Hyperactivity: No  Confusion: No  Changes in hair: Yes  Changes in moles/birth marks: No  Itching: Yes  Rashes: Yes  Changes in nails: Yes  Acne: No  Hair in places you don't want it: No  Change in facial hair: No  Warts: No  Non-healing sores: No  Scarring: No  Flaking of skin: Yes  Color changes of hands/feet in cold : No  Sun sensitivity: No  Skin thickening: No  Eye pain: No  Vision loss: No  Dry eyes: Yes  Watery eyes: No  Eye bulging: No  Double vision: No  Flashing of lights: No  Spots: Yes  Floaters: Yes  Redness:  No  Crossed eyes: No  Tunnel Vision: No  Yellowing of eyes: No  Eye irritation: Yes  Chest pain or pressure: Yes  Fast or irregular heartbeat: No  Pain in legs with walking: Yes  Trouble breathing while lying down: No  Fingers or toes appear blue: No  High blood pressure: Yes  Low blood pressure: No  Fainting: No  Murmurs: No  Pacemaker: No  Varicose veins: No  Edema or swelling: Yes  Wake up at night with shortness of breath: No  Light-headedness: Yes  Exercise intolerance: No  Heart burn or indigestion: Yes  Nausea: Yes  Vomiting: No  Abdominal pain: Yes  Bloating: Yes  Constipation: No  Diarrhea: Yes  Blood in stool: No  Black stools: No  Rectal or Anal pain: No  Fecal incontinence: No  Yellowing of skin or eyes: No  Vomit with blood: No  Change in stools: No  Trouble holding urine or incontinence: Yes  Pain or burning: No  Trouble starting or stopping: Yes  Increased frequency of urination: Yes  Blood in urine: No  Decreased frequency of urination: No  Frequent nighttime urination: No  Flank pain: Yes  Difficulty emptying bladder: Yes  Back pain: Yes  Muscle aches: Yes  Neck pain: Yes  Swollen joints: No  Joint pain: Yes  Bone pain: Yes  Muscle cramps: Yes  Muscle weakness: Yes  Joint stiffness: No  Bone fracture: No  Trouble with coordination: No  Dizziness or trouble with balance: Yes  Fainting or black-out spells: No  Memory loss: No  Headache: No  Seizures: No  Speech problems: No  Tingling: No  Tremor: No  Weakness: Yes  Difficulty walking: Yes  Paralysis: No  Numbness: No          Again, thank you for allowing me to participate in the care of your patient.        Sincerely,        Odette Weston MD

## 2022-05-13 NOTE — PATIENT INSTRUCTIONS
1) Increase Tresiba 32u every morning  2) Start Glipizide 5mg XR daily  3) Continue physical activity/active life style

## 2022-05-17 ENCOUNTER — OFFICE VISIT (OUTPATIENT)
Dept: FAMILY MEDICINE | Facility: CLINIC | Age: 77
End: 2022-05-17
Payer: MEDICARE

## 2022-05-17 VITALS
TEMPERATURE: 96.2 F | OXYGEN SATURATION: 98 % | HEART RATE: 58 BPM | SYSTOLIC BLOOD PRESSURE: 175 MMHG | BODY MASS INDEX: 40.35 KG/M2 | DIASTOLIC BLOOD PRESSURE: 58 MMHG | RESPIRATION RATE: 16 BRPM | WEIGHT: 206.6 LBS

## 2022-05-17 DIAGNOSIS — G47.33 OSA (OBSTRUCTIVE SLEEP APNEA): ICD-10-CM

## 2022-05-17 DIAGNOSIS — M25.562 CHRONIC PAIN OF LEFT KNEE: ICD-10-CM

## 2022-05-17 DIAGNOSIS — E66.01 MORBID OBESITY (H): ICD-10-CM

## 2022-05-17 DIAGNOSIS — G89.29 CHRONIC PAIN OF LEFT KNEE: ICD-10-CM

## 2022-05-17 DIAGNOSIS — G89.29 CHRONIC RIGHT SHOULDER PAIN: ICD-10-CM

## 2022-05-17 DIAGNOSIS — N18.30 STAGE 3 CHRONIC KIDNEY DISEASE, UNSPECIFIED WHETHER STAGE 3A OR 3B CKD (H): ICD-10-CM

## 2022-05-17 DIAGNOSIS — Z23 HIGH PRIORITY FOR 2019-NCOV VACCINE: ICD-10-CM

## 2022-05-17 DIAGNOSIS — Z79.899 ENCOUNTER FOR LONG-TERM (CURRENT) USE OF MEDICATIONS: ICD-10-CM

## 2022-05-17 DIAGNOSIS — E11.9 TYPE 2 DIABETES, HBA1C GOAL < 7% (H): Primary | ICD-10-CM

## 2022-05-17 DIAGNOSIS — M25.511 CHRONIC RIGHT SHOULDER PAIN: ICD-10-CM

## 2022-05-17 DIAGNOSIS — A80.9 POLIOMYELITIS: ICD-10-CM

## 2022-05-17 DIAGNOSIS — K21.9 GASTROESOPHAGEAL REFLUX DISEASE WITHOUT ESOPHAGITIS: ICD-10-CM

## 2022-05-17 DIAGNOSIS — I10 BENIGN ESSENTIAL HYPERTENSION: ICD-10-CM

## 2022-05-17 LAB
ERYTHROCYTE [SEDIMENTATION RATE] IN BLOOD BY WESTERGREN METHOD: 10 MM/HR (ref 0–30)
HBA1C MFR BLD: 8.2 % (ref 0–5.6)

## 2022-05-17 PROCEDURE — 36415 COLL VENOUS BLD VENIPUNCTURE: CPT | Performed by: INTERNAL MEDICINE

## 2022-05-17 PROCEDURE — 91305 COVID-19,PF,PFIZER (12+ YRS): CPT | Performed by: INTERNAL MEDICINE

## 2022-05-17 PROCEDURE — 99214 OFFICE O/P EST MOD 30 MIN: CPT | Mod: 25 | Performed by: INTERNAL MEDICINE

## 2022-05-17 PROCEDURE — 80061 LIPID PANEL: CPT | Performed by: INTERNAL MEDICINE

## 2022-05-17 PROCEDURE — 83036 HEMOGLOBIN GLYCOSYLATED A1C: CPT | Performed by: INTERNAL MEDICINE

## 2022-05-17 PROCEDURE — 80048 BASIC METABOLIC PNL TOTAL CA: CPT | Performed by: INTERNAL MEDICINE

## 2022-05-17 PROCEDURE — 85652 RBC SED RATE AUTOMATED: CPT | Performed by: INTERNAL MEDICINE

## 2022-05-17 PROCEDURE — 0054A COVID-19,PF,PFIZER (12+ YRS): CPT | Performed by: INTERNAL MEDICINE

## 2022-05-17 PROCEDURE — 82043 UR ALBUMIN QUANTITATIVE: CPT | Performed by: INTERNAL MEDICINE

## 2022-05-17 RX ORDER — FAMOTIDINE 20 MG/1
40 TABLET, FILM COATED ORAL PRN
Qty: 180 TABLET | Refills: 3 | Status: SHIPPED | OUTPATIENT
Start: 2022-05-17 | End: 2022-10-12

## 2022-05-17 RX ORDER — GABAPENTIN 100 MG/1
100 CAPSULE ORAL
Qty: 90 CAPSULE | Refills: 3 | Status: SHIPPED | OUTPATIENT
Start: 2022-05-17 | End: 2022-07-07

## 2022-05-17 ASSESSMENT — PAIN SCALES - GENERAL: PAINLEVEL: SEVERE PAIN (7)

## 2022-05-17 NOTE — PROGRESS NOTES
Raheem Cochran is a 76 year old who presents for the following health issues     History of Present Illness       Back Pain:  She presents for follow up of back pain. Patient's back pain is a recurring problem.  Location of back pain:  Right middle of back  Description of back pain: sharp, shooting and stabbing  Back pain spreads: nowhere    Since patient first noticed back pain, pain is: gradually worsening  Does back pain interfere with her job:  Not applicable      CKD: She uses over the counter pain medication, including Tylenol Extra Strength, three times daily.    Reason for visit:  Not feeling well, severe pain left leg, knee and lower leg; severe pain upper right side and back; extremely tired; wake up after 4 to 6 hours of sleep. Shortness of breath if I lay flat. Feeling bloated.  Symptom onset:  More than a month  Symptoms include:  Bad pain, very tired, bloating,  Symptom intensity:  Severe  Symptom progression:  Worsening  Had these symptoms before:  No  What makes it worse:  Not sure  What makes it better:  Not that I noticed    She eats 2-3 servings of fruits and vegetables daily.She consumes 0 sweetened beverage(s) daily.She exercises with enough effort to increase her heart rate 10 to 19 minutes per day.  She exercises with enough effort to increase her heart rate 3 or less days per week.   She is taking medications regularly.     Obesity   Chasity Hodgson has been trying to lose weight.  She is retaining fluid in her legs.  She notes that she was [facility]  Hypertension   Has been taking atenolol, amlodipine and lisinopril.  Has not been taking furosemide regularly due to urinary frequency.  She is noticing worsening edema in her left and right leg.  She is watching her sodium   Low back and hip pain   Chasity Hodgson has had occasional right sided hip and right flank/low back pain with muscle spasm.  Pain seems to come and go.  Nothing seems to trigger or help with  this pain.  She is drinking plenty of fluids.  CT showed no concerns from 05/06/22    Review of Systems   Constitutional, HEENT, cardiovascular, pulmonary, GI, , musculoskeletal - chronic right shoulder and left knee pain- requests referral to pain clinic, neuro, skin - chronic edema, endocrine and psych systems are negative, except as otherwise noted.      Objective    BP (!) 175/58   Pulse 58   Temp (!) 96.2  F (35.7  C) (Temporal)   Resp 16   Wt 93.7 kg (206 lb 9.6 oz)   SpO2 98%   Breastfeeding No   BMI 40.35 kg/m    Body mass index is 40.35 kg/m .  Physical Exam   GENERAL: healthy, alert and no distress  EYES: Eyes grossly normal to inspection,   NECK: no adenopathy, no asymmetry, masses  RESP: lungs clear to auscultation - no rales, rhonchi or wheezes  CV: regular rate and rhythm, normal S1 S2, no S3 or S4, no murmur, click or rub, 2+ peripheral edema   ABDOMEN: obese, soft, nontender, no hepatosplenomegaly,   MS: no gross musculoskeletal defects noted,   SKIN: legs wrapped  NEURO: Normal strength and tone, mentation intact and speech normal  PSYCH: mentation appears normal, affect normal/bright    Recent Results (from the past 240 hour(s))   **A1C FUTURE 3mo    Collection Time: 05/17/22  3:59 PM   Result Value Ref Range    Hemoglobin A1C 8.2 (H) 0.0 - 5.6 %   ESR: Erythrocyte sedimentation rate    Collection Time: 05/17/22  3:59 PM   Result Value Ref Range    Erythrocyte Sedimentation Rate 10 0 - 30 mm/hr         Patient Instructions   (A80.9) Poliomyelitis  Comment: Continue follow up in polio clinic and we will refill gabapentin  Plan: gabapentin (NEURONTIN) 100 MG capsule            (K21.9) Gastroesophageal reflux disease without esophagitis  Comment: OK to continue famotidine   Plan: famotidine (PEPCID) 20 MG tablet            (E66.01) Morbid obesity (H)  Comment: Recommend consult with nutrition to help work on weight loss  Plan: Nutrition Referral            (N18.30) Stage 3 chronic kidney  disease, unspecified whether stage 3a or 3b CKD (H)  Comment: Check labs again today  Plan: Nutrition Referral            (I10) Benign essential hypertension  Comment: blood pressure is quite elevated - recommend resuming lasix and follow up in 1 month  Plan:     (E11.9) Type 2 diabetes, HbA1c goal < 7% (H)  Comment: check labs in 1 month  and follow up in endocrinology   Plan: Nutrition Referral              30 minutes spent with patient.  Over 50% of time counseling

## 2022-05-17 NOTE — PATIENT INSTRUCTIONS
(A80.9) Poliomyelitis  Comment: Continue follow up in polio clinic and we will refill gabapentin  Plan: gabapentin (NEURONTIN) 100 MG capsule            (K21.9) Gastroesophageal reflux disease without esophagitis  Comment: OK to continue famotidine   Plan: famotidine (PEPCID) 20 MG tablet            (E66.01) Morbid obesity (H)  Comment: Recommend consult with nutrition to help work on weight loss  Plan: Nutrition Referral            (N18.30) Stage 3 chronic kidney disease, unspecified whether stage 3a or 3b CKD (H)  Comment: Check labs again today  Plan: Nutrition Referral            (I10) Benign essential hypertension  Comment: blood pressure is quite elevated - recommend resuming lasix and follow up in 1 month  Plan:     (E11.9) Type 2 diabetes, HbA1c goal < 7% (H)  Comment: check labs in 1 month  and follow up in endocrinology   Plan: Nutrition Referral

## 2022-05-18 LAB
ANION GAP SERPL CALCULATED.3IONS-SCNC: 3 MMOL/L (ref 3–14)
BUN SERPL-MCNC: 28 MG/DL (ref 7–30)
CALCIUM SERPL-MCNC: 9.5 MG/DL (ref 8.5–10.1)
CHLORIDE BLD-SCNC: 107 MMOL/L (ref 94–109)
CHOLEST SERPL-MCNC: 163 MG/DL
CO2 SERPL-SCNC: 28 MMOL/L (ref 20–32)
CREAT SERPL-MCNC: 1.1 MG/DL (ref 0.52–1.04)
FASTING STATUS PATIENT QL REPORTED: NO
GFR SERPL CREATININE-BSD FRML MDRD: 52 ML/MIN/1.73M2
GLUCOSE BLD-MCNC: 138 MG/DL (ref 70–99)
HDLC SERPL-MCNC: 39 MG/DL
LDLC SERPL CALC-MCNC: 67 MG/DL
NONHDLC SERPL-MCNC: 124 MG/DL
POTASSIUM BLD-SCNC: 4.9 MMOL/L (ref 3.4–5.3)
SODIUM SERPL-SCNC: 138 MMOL/L (ref 133–144)
TRIGL SERPL-MCNC: 285 MG/DL

## 2022-05-19 NOTE — RESULT ENCOUNTER NOTE
Gerard Gaspar,    I have had the opportunity to review your recent results and an interpretation is as follows:  Your sed rate returned stable - lessening the concern for infection     Sincerely,  Shola Benoit MD

## 2022-05-20 LAB
CREAT UR-MCNC: 59 MG/DL
MICROALBUMIN UR-MCNC: 12 MG/L
MICROALBUMIN/CREAT UR: 20.34 MG/G CR (ref 0–25)

## 2022-06-01 ENCOUNTER — TRANSFERRED RECORDS (OUTPATIENT)
Dept: HEALTH INFORMATION MANAGEMENT | Facility: CLINIC | Age: 77
End: 2022-06-01
Payer: MEDICARE

## 2022-06-01 NOTE — PROGRESS NOTES
[facility] Consultation    Date of visit: 6/1/2022    Reason for consultation:    Chasity Hodgson is a 76 year old female who is seen in consultation today at the request of her primary care physician, Shola Benoit.     Consultation and Evaluation for: poliomyelitis - back pain    Review of [place] Prescription Monitoring Program (): Today I have also reviewed the patient's history of controlled substance use, as provided by [place] licensed pharmacies and prescriber dispensers. YES  NORCO 5mg #15 4/5/2022  OXYCODONE 5MG #5 3/28/2022  GABAPENTIN 100MG #90 3/18/2022    Review of Pain Questionnaire: Please see the [facility] health questionnaire, which the patient completed and reviewed with me in detail.    Review of Electronic Chart: Today I have also reviewed available medical information in the patient's medical record at [place] ([facility]), including relevant provider notes, laboratory work, and imaging.       Chief Complaint:    Chief Complaint   Patient presents with     Pain       Pain history:  Chasity Hodgson is a 76 year old female who presents for initial evaluation of chief pain complaint of chronic widespread pain.     - She is interested in medication management of her pain.  She is currently taking just tylenol extra strength and gabapentin. Gabapentin makes her sleepy.  She does not want anything that has side effects.   -The patient describes severe aching pain in her lower back and left upper side of back above her waist.  She also has extreme aching pain in her left knee and a bad ache in her left thigh, hip and groin area.  She also complains of a bad aching pain in her left shoulder and upper arm.  - When she had surgery on her finger they gave her oxycodone and that helped with all her pain.   -A lot of walking makes her pain unbearable specifically trying to go upstairs.  Laying down with her legs elevated helps  to relieve the pain as does sitting in her recliner.  She also uses ice and heat which is helpful.  - She has been on gabapentin for many years and has side effects of sedation.  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   -She has been seeing a lot of different providers for her pain including a spine surgeon and orthopedic surgeon and physical medicine and rehab doctor.  They have treated her using things like physical therapy and injection therapy but have not managed her medications.  She asked for a referral from her primary to discuss medication management.  - Seeing Sharon Cook DO with [facility].  PM&R physician.   - Seeing Dr. Sousa at [facility] spine.  Last follow up was 5/6/2022  - S/P left total hip replacement 9/14/2020 with Dr. Henderson at [facility]. She sees him for left hip and knee pain.  Appt on June 6th for cortisone injection left knee.   Had a left knee MRI 4/27/22  - Dr. Ferrera at [facility] for left shoulder pain.   - LEFT L3-4 transforaminal epidural steroid injection at [facility] May 12th, 2022    - [facility] trochanteric burs[facility]   - Diagnosed with polio when she was 3 years old.       Pain is described as Aching, Burning and Exhausting   Pain rating: intensity ranges from 3/10 to 10/10, and Averages 8/10 on a 0-10 scale.  Red Flags: The patient denies bowel or bladder incontinence, parasthesias, weakness, saddle anesthesia, unintentional weight loss, or fever/chills/sweats.       MEDICATIONS FOR PAIN:   GABAPENTIN 100MG at bedtime   TYLENOL PRN       INJECTIONS:   -She recently had a lumbar transforaminal epidural steroid injection done at [facility] on May 12 this was a left L3 4 injection.    SURGICAL HISTORY:   No spine surgery history    IMAGING:  LUMBAR MRI 4/21/2022:         Social History:  [place]  Her  has Parkinson's and is living in an assisted  living.  Occupation/Schooling: The patient is a retired  for [employer].  Tobacco use: None  Drug use: None  Alcohol use: Denies any use  History of chemical dependency treatment: None  Mental health admissions: None    Past Medical History:  Past Medical History:   Diagnosis Date     Abdominal adhesions 1984, 96,99    s/p lysis     Abdominal adhesions      Acne rosacea      Allergic rhinitis      Allergic rhinitis, cause unspecified      Alopecia      Anemia      CAD (coronary artery disease)      Carotid stenosis      CKD (chronic kidney disease) stage 2, GFR 60-89 ml/min      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)      CPAP (continuous positive airway pressure) dependence      Depression      Diabetic gastroparesis (H)      Diet-controlled type 2 diabetes mellitus (H)      DM2 (diabetes mellitus, type 2) (H)      DVT (deep venous thrombosis) (H)      DVT of axillary vein, acute right (H)      Fibromyalgia      Gastro-oesophageal reflux disease      GERD (gastroesophageal reflux disease)      Hernia of unspecified site of abdominal cavity without mention of obstruction or gangrene     bilateral     History of blood transfusion     10/ 1980     HTN (hypertension)      HTN (hypertension)      Hypertriglyceridemia      Hypertriglyceridemia      Hypokalemia      Hypothyroidism      Obstructive sleep apnea      ANNETTE (obstructive sleep apnea)      ANNETTE on CPAP      Osteoarthritis of glenohumeral joint      Papillary carcinoma of thyroid (H)     s/p thyroidectomy - Ruegemer     Papillary carcinoma of thyroid (H)      PE (pulmonary embolism)      Poliomyelitis     poor circulation right leg     Poliomyelitis      Postsurgical hypothyroidism     s/p papillary thryoid cancer - Ruegemer     Pulmonary embolism (H)      Pulmonary embolus (H)      Pulmonary nodule      Rosacea      S/P carpal tunnel release     bilateral     S/P hardware removal 01/2014    still with lingering foot pain     S/P shoulder surgery     bilateral      Septic joint (H)     right knee     Septic joint of right knee joint (H)      Venous insufficiency      Venous insufficiency      Venous thrombosis 1999    right axillary vein       Past Surgical History:  Past Surgical History:   Procedure Laterality Date     AMPUTATE TOE(S)  3/15/2012    Procedure:AMPUTATE TOE(S); Surgeon:RUBEN MOSES; Location: OR     AMPUTATE TOE(S)       APPENDECTOMY  1972     APPENDECTOMY       ARTHRODESIS FOOT  3/15/2012    Procedure:ARTHRODESIS FOOT; RIGHT TRIPLE ARTHRODESIS, FIFTH TOE AMPUTATION, LATERAL LIGAMENT RECONSTRUCTION, TENDON TRANSFER AND RELEASE [MINI C-ARM, ARTHREX 4.5 AND 6.7 STAPLES, BIOCOMPOSITE TENODESIS SCREWS]; Surgeon:RUBEN MOSES; Location: OR     ARTHRODESIS FOOT Right     Right foot triple arthrodesis and removal of hardware     ARTHROSCOPY SHOULDER  06/25/2015    REVISION SUBACROMIAL DECOMPRESSION, EXCISION OF GANGLION CYST, DEBRIDEMENT AND EXCISION OF THE GLENOHUMERAL JOINT GANGLION CYST, CORACOID DECOMPRESSION, POSSIBLE SUBSCAPULARIS REPAIR AND OPEN SUBSCAPULARIS BICEP     ARTHROSCOPY SHOULDER ROTATOR CUFF REPAIR       BIOPSY  Thyroid 2002     BREAST SURGERY  Biopsy     CHOLECYSTECTOMY       CHOLECYSTECTOMY       COLONOSCOPY  2018     COLOSTOMY  2/7/2012    Procedure:COLOSTOMY; CREATION OF SIGMOID COLOSTOMY AND EXTENSIVE  LYSIS OF ADHESIONS; Surgeon:MONTSERRAT BENDER; Location: OR     COLOSTOMY       EYE SURGERY       GENITOURINARY SURGERY  1999     GI SURGERY      weakened rectal sphincter with artificial stimulator     HERNIA REPAIR  1976     HYSTERECTOMY TOTAL ABDOMINAL       LAPAROTOMY, LYSIS ADHESIONS, COMBINED  2/7/2012    Procedure:COMBINED LAPAROTOMY, LYSIS ADHESIONS; Surgeon:MONTSERRAT BENDER P; Location: OR     RELEASE CARPAL TUNNEL       RELEASE TENDON FOOT  3/15/2012    Procedure:RELEASE TENDON FOOT; Surgeon:RUBEN MOSES; Location: OR     REMOVE HARDWARE FOOT  12/13/2012    Procedure: REMOVE HARDWARE  FOOT;  RIGHT FOOT REMOVAL OF HARDWARE;  Surgeon: Sukhdeep Metz MD;  Location: [facility] OR     SHOULDER SURGERY  11/12/2020    LEFT SHOULDER HEMIARHTROPLASTY, BICEP TENODESIS     SOFT TISSUE SURGERY  2018, 2020     TENDON RELEASE      foot     THYROIDECTOMY       ZC FREEING BOWEL ADHESION,ENTEROLYSIS      1986, 1996, 1999     ZZC NONSPECIFIC PROCEDURE      throidectomy     ZZC TOTAL ABDOM HYSTERECTOMY  1980    + BSO       Medications:  Current Outpatient Medications   Medication Sig Dispense Refill     amLODIPine (NORVASC) 2.5 MG tablet Take 2.5mg in AM, 2.5mg at noon, and 5mg at bedtime 360 tablet 3     aspirin (ASA) 81 MG EC tablet Take 81 mg by mouth daily       atenolol (TENORMIN) 50 MG tablet TAKE ONE TABLET BY MOUTH TWICE A  tablet 2     azelastine (ASTEPRO) 0.15 % nasal spray 1 spray       Cholecalciferol (VITAMIN D-3 PO) Take 2 tablets by mouth       clotrimazole (LOTRIMIN) 1 % cream Apply topically daily       Continuous Blood Gluc Sensor (FREESTYLE BRANDY 14 DAY SENSOR) [facility] Apply 1 sensor and change every 14 days. 2 each 11     diphenhydrAMINE-acetaminophen (TYLENOL PM)  MG tablet Take 1 tablet by mouth At Bedtime Reported on 3/20/2017       ezetimibe (ZETIA) 10 MG tablet TAKE ONE TABLET BY MOUTH ONCE DAILY 90 tablet 3     famotidine (PEPCID) 20 MG tablet Take 2 tablets (40 mg) by mouth as needed 180 tablet 3     fenofibrate (TRICOR) 145 MG tablet TAKE ONE TABLET BY MOUTH ONCE DAILY 90 tablet 3     ferrous sulfate (IRON) 325 (65 FE) MG tablet Take 325 mg by mouth daily (with breakfast)       fexofenadine (ALLEGRA) 180 MG tablet Take 180 mg by mouth daily. 120 0     fluocinolone acetonide (DERMA SMOOTHE/FS BODY) 0.01 % external oil Apply 2 mL to scalp once per week. Massage into scalp. Can be left in overnight or washed out after 4-6 hours. 118.28 mL 5     fluticasone (FLONASE) 50 MCG/ACT spray Spray 2 sprays in nostril daily 2 sprays in each nostril qd 1 Bottle 0     furosemide (LASIX)  20 MG tablet Take 1 tablet (20 mg) by mouth daily 90 tablet 1     gabapentin (NEURONTIN) 100 MG capsule Take 1 capsule (100 mg) by mouth every evening as needed 90 capsule 3     glipiZIDE (GLUCOTROL XL) 5 MG 24 hr tablet Take 1 tablet (5 mg) by mouth daily 90 tablet 3     lisinopril (ZESTRIL) 40 MG tablet TAKE ONE TABLET BY MOUTH ONCE DAILY 90 tablet 3     MULTIVITAMINS OR TABS ONE DAILY 100 3     nitroGLYcerin (NITROSTAT) 0.4 MG sublingual tablet Place 1 tablet (0.4 mg) under the tongue every 5 minutes as needed for chest pain (no more than 3 in one hour; after 3rd, call 911.) 25 tablet 3     nystatin (MYCOSTATIN) cream Apply topically daily as needed        nystatin-triamcinolone (MYCOLOG II) cream Apply topically daily as needed        ondansetron (ZOFRAN) 4 MG tablet Take 1 tablet by mouth every 6 hours as needed Reported on 3/20/2017       order for DME Equipment being ordered: Compression stockings - Knee High; 20-30 mmHg compression - note would like adhesive band to keep the stocking from sliding down 3 each 0     order for DME Equipment being ordered: Oral appliance for sleep apnea 1 Units 0     pantoprazole (PROTONIX) 40 MG EC tablet Take 40 mg by mouth 2 times daily       sitagliptin (JANUVIA) 50 MG tablet Take 1 tablet (50 mg) by mouth daily 90 tablet 3     SYNTHROID 112 MCG tablet Take 1 tablet (112 mcg) by mouth daily Take 112 mcg daily except for Friday takes only 56 mcg.  Brand name Synthroid 90 tablet 3     TRESIBA FLEXTOUCH 100 UNIT/ML pen Inject 32 Units Subcutaneous every morning 5 mL 11     tretinoin (RETIN-A) 0.025 % external cream Use every night as tolerated - spot treat lesion 20 g 3       Allergies:     Allergies   Allergen Reactions     Nsaids Difficulty breathing     Increased creatinine     Toradol Difficulty breathing     Shortness of breath     Celecoxib Itching and Rash     Codeine Itching     With higher doses     Crestor [Rosuvastatin] Muscle Pain (Myalgia)     No Clinical Screening  - See Comments Itching     Fragrance     Vioxx Other (See Comments)     Heart races     Conjugated Estrogens Rash     Sulfa Drugs Rash       Family history:  Family History   Problem Relation Age of Onset     Arthritis Mother      Hypertension Mother      Cerebrovascular Disease Mother      Obesity Mother      Heart Disease Mother         MI's     Hypertension Father      Respiratory Father         Adult RDS     Diabetes Father      Arthritis Sister      Cancer Sister      Diabetes Sister      Hypertension Sister      Breast Cancer Sister      Depression Sister      Thyroid Disease Sister      Obesity Sister      Arthritis Sister      Hypertension Sister      Thyroid Disease Sister      Osteoporosis Sister      Obesity Sister      Cancer Sister         colon polup     Heart Disease Brother         MI at 54     Other Cancer Brother         Lung & bone     Hypertension Sister      Osteoporosis Sister      Obesity Sister      Colon Cancer Sister      Lipids Brother      Hypertension Brother      Diabetes Brother      Hyperlipidemia Brother      Lipids Sister      Obesity Sister      Obesity Maternal Grandmother         Dad s mother     Skin Cancer Maternal Grandmother         skin cancer unknown     Cancer Maternal Grandmother         unknown skin cancer on face     Obesity Paternal Grandmother         Mothers mother       ROS:  Constitutional, neuro, ENT, endocrine, pulmonary, cardiac, gastrointestinal, genitourinary, musculoskeletal, integument and psychiatric systems are negative, except as otherwise noted.    Physical Exam:  Vitals:    06/02/22 0902   BP: (!) 148/72   Pulse: 63   SpO2: 99%       GENERAL: Healthy, alert and no distress  EYES: Eyes grossly normal to inspection.  No discharge or erythema, or obvious scleral/conjunctival abnormalities.  RESP: No audible wheeze, cough, or visible cyanosis.  No visible retractions or increased work of breathing.    SKIN: Visible skin clear. No significant rash, abnormal  pigmentation or lesions.  NEURO: Cranial nerves grossly intact.  Mentation and speech appropriate for age.  PSYCH: Mentation appears normal, affect normal/bright, judgement and insight intact, normal speech and appearance well-groomed.    ASSESSMENT/PLAN:  The following recommendations were given to the patient. Diagnosis, treatment options, risks, benefits, and alternatives were discussed, and all questions were answered. The patient expressed understanding of the plan for management.     1. Chronic pain syndrome  The patient has chronic widespread pain.  Her most significant pain is low back and leg pain related to her severe central stenosis and neurogenic claudication.  She is currently seeing a spine doctor and orthopedic doctor and a physical medicine and rehab doctor for this pain and surgery and injection therapy are being considered.  She is fond of these doctors and thinks they are doing a good job.  Because of this I offered to simply discuss medication options for treating her pain today.    I am only making 1 medication change today as she is elderly and sensitive to medications.  I am going to have her discontinue gabapentin as she is on really low dose only 100 mg every night and cannot tolerate dose increases secondary to sedation.  I think Lyrica will be more effective for her pain with less side effects.  We can ramp up to an effective dose faster.    I also discussed Cymbalta as another alternative for her.  Because I do not want to add 2 medications at once we can discuss this at her next follow-up.    She should continue to use her topical medications which are somewhat helpful for her.  These include Salonpas patches and icy hot.    [facility] at [facility].    2. Neuropathic pain  Discontinue gabapentin and start Lyrica.    - pregabalin (LYRICA) 25 MG capsule; Take 1 capsule (25 mg) by mouth 2 times daily  Dispense: 60 capsule; Refill: 0    3. Poliomyelitis    - Pain  Management Referral    4. Chronic pain of left knee    - Pain Management Referral    5. Chronic right shoulder pain    - Pain Management Referral      MEDICATIONS:   Orders Placed This Encounter   Medications     pregabalin (LYRICA) 25 MG capsule     Sig: Take 1 capsule (25 mg) by mouth 2 times daily     Dispense:  60 capsule     Refill:  0          - Continue other medications without change           FOLLOW UP: I scheduled her for a follow-up appointment with myself on July 7, 2022 at 11:30 AM.  I explained that she is currently seeing a lot of physicians for her pain and once we have her on a medication regimen that is effective and stable dosing she could probably return to her primary care physician for ongoing prescribing.      BILLING TIME DOCUMENTATION:   The total TIME spent on this patient on the date of the encounter/appointment was 72 minutes.      TOTAL TIME includes:   Time spent preparing to see the patient (reviewing records and tests) - 10 min  Time spent face to face (or over the phone) with the patient - 46 min  Time spent ordering tests, medications, procedures and referrals - 2 min  Time spent Referring and communicating with other healthcare professionals - 0 min  Time spent documenting clinical information in Epic - 14 min       ANDREA ADDISON MD   Pain Management & Addiction Medicine

## 2022-06-02 ENCOUNTER — OFFICE VISIT (OUTPATIENT)
Dept: PALLIATIVE MEDICINE | Facility: CLINIC | Age: 77
End: 2022-06-02
Attending: INTERNAL MEDICINE
Payer: MEDICARE

## 2022-06-02 VITALS — DIASTOLIC BLOOD PRESSURE: 72 MMHG | OXYGEN SATURATION: 99 % | SYSTOLIC BLOOD PRESSURE: 148 MMHG | HEART RATE: 63 BPM

## 2022-06-02 DIAGNOSIS — M25.562 CHRONIC PAIN OF LEFT KNEE: ICD-10-CM

## 2022-06-02 DIAGNOSIS — G89.4 CHRONIC PAIN SYNDROME: Primary | ICD-10-CM

## 2022-06-02 DIAGNOSIS — M25.511 CHRONIC RIGHT SHOULDER PAIN: ICD-10-CM

## 2022-06-02 DIAGNOSIS — G89.29 CHRONIC PAIN OF LEFT KNEE: ICD-10-CM

## 2022-06-02 DIAGNOSIS — G89.29 CHRONIC RIGHT SHOULDER PAIN: ICD-10-CM

## 2022-06-02 DIAGNOSIS — M79.2 NEUROPATHIC PAIN: ICD-10-CM

## 2022-06-02 DIAGNOSIS — A80.9 POLIOMYELITIS: ICD-10-CM

## 2022-06-02 PROCEDURE — 99205 OFFICE O/P NEW HI 60 MIN: CPT | Performed by: ANESTHESIOLOGY

## 2022-06-02 RX ORDER — PREGABALIN 25 MG/1
25 CAPSULE ORAL 2 TIMES DAILY
Qty: 60 CAPSULE | Refills: 0 | Status: SHIPPED | OUTPATIENT
Start: 2022-06-02 | End: 2022-07-07

## 2022-06-02 ASSESSMENT — PAIN SCALES - GENERAL: PAINLEVEL: EXTREME PAIN (9)

## 2022-06-02 NOTE — PATIENT INSTRUCTIONS
Discontinue Gabapentin 100mg at night.    Start Lyrica 25mg tablets TWICE a day.  This may require a prior authorization  FOLLOW UP: VIDEO VISIT   - scheduled for 7/7/2022 @ 6000   we can discuss adding another medication or increasing your lyrica if you are tolerating it alright.     Laura Church MD   ----------------------------------------------------------------  Clinic Number:  [phone]   Call with any questions about your care and for scheduling assistance.   Calls are returned Monday through Friday between 8 AM and 4:30 PM. We usually get back to you within 2 business days depending on the issue/request.    If we are prescribing your medications:  For opioid medication refills, call the clinic or send a [employer] message 7 days in advance.  Please include:  Name of requested medication  Name of the pharmacy.  For non-opioid medications, call your pharmacy directly to request a refill. Please allow 3-4 days to be processed.   Per [place] State Law:  All controlled substance prescriptions must be filled within 30 days of being written.    For those controlled substances allowing refills, pickup must occur within 30 days of last fill.      We believe regular attendance is key to your success in our program!    Any time you are unable to keep your appointment we ask that you call us at least 24 hours in advance to cancel.This will allow us to offer the appointment time to another patient.   Multiple missed appointments may lead to dismissal from the clinic.

## 2022-06-04 ENCOUNTER — HEALTH MAINTENANCE LETTER (OUTPATIENT)
Age: 77
End: 2022-06-04

## 2022-06-08 ENCOUNTER — NURSE TRIAGE (OUTPATIENT)
Dept: NURSING | Facility: CLINIC | Age: 77
End: 2022-06-08

## 2022-06-08 NOTE — TELEPHONE ENCOUNTER
"Nurse Triage SBAR    Is this a 2nd Level Triage? YES, LICENSED PRACTITIONER REVIEW IS REQUIRED    Situation:   76 year old female with type 2 diabetes calls with hi    gh blood sugars   She states she had a steroid injection into her left knee yesterday   Her sugars have been running high since    Yesterday before injection her BS was under 200  After the injection her BS was 374   At midnight her BS -read too high!!  At 1:30pm today -401 and at 1:53pm while I was talking with her it was 396.    She has had oatmeal with Fruit for breakfast   Peanut butter toast for lunch   She has been drinking water   She has taken her medications as prescribed but did increase her dose of tresiba to 32 units this morning    She is not symptomatic -may be a bit fatigued but that is not new      Background:   type 2 diabetes   Had steroid injection to left knee yesterday    Assessment:   high blood sugars due to steroid injection   Will forward to diabetes team to call back with recommendations      Protocol Recommended Disposition:   Discuss With PCP And Call Back By Nurse Within 1 Hour        Routed to provider     Reason for Disposition    Blood glucose > 300 mg/dL (16.7 mmol/L) AND two or more times in a row    Additional Information    Negative: Blood glucose > 400 mg/dL (22.2 mmol/L)    Answer Assessment - Initial Assessment Questions  1. BLOOD GLUCOSE: \"What is your blood glucose level?\"       396  2. ONSET: \"When did you check the blood glucose?\"       396-1:53 pm and it was 401 at 1:30pm    3. USUAL RANGE: \"What is your glucose level usually?\" (e.g., usual fasting morning value, usual evening value)      Under 200  4. KETONES: \"Do you check for ketones (urine or blood test strips)?\" If yes, ask: \"What does the test show now?\"         5. TYPE 1 or 2:  \"Do you know what type of diabetes you have?\"  (e.g., Type 1, Type 2, Gestational; doesn't know)       Type 2  6. INSULIN: \"Do you take insulin?\" \"What type of insulin(s) do " "you use? What is the mode of delivery? (syringe, pen; injection or pump)?\"       Take tresiba   7. DIABETES PILLS: \"Do you take any pills for your diabetes?\" If yes, ask: \"Have you missed taking any pills recently?\"      Lulu  8. OTHER SYMPTOMS: \"Do you have any symptoms?\" (e.g., fever, frequent urination, difficulty breathing, dizziness, weakness, vomiting)      No symptoms   Some fatigue   9. PREGNANCY: \"Is there any chance you are pregnant?\" \"When was your last menstrual period?\"      no    Protocols used: DIABETES - HIGH BLOOD SUGAR-A-OH      "

## 2022-06-09 NOTE — TELEPHONE ENCOUNTER
Message received today, though I don't cover for Dr. Weston's patient calls.  Since no other triage options available today, I called patient back this evening.    Ms FARSHAD said she had a knee steroid injection yesterday and has high glucose levels.  Before the procedure,  mg/dl then subsequent BG levels yesterday and today in the 275-401 mg/dl range.  Her usual diabetes meds are Tresiba 30U daily, glipizideXL 5 mg BID and Januvia 50 mg daily.  No rapid acting insulin available.      Earlier today, she decided to raise her Tresiba dose as 32U daily and took an extra pregabalin (Lyrica) thinking this would lower her BG levels... but still in the low 300's.  No nausea, vomiting reported.  I advised she take an extra glipizideXL tablet tonight and if BG still >250 mg/dl tomorrow morning, take another (morning) glipizide tablet (2-tablet dose).  She will continue the Tresiba and Januvia meds and BG testing premeal and bedtime next 2 days.  If persistent high BG >250 mg/dl, she will call back to our clinic for feedback from Dr. Weston.    She agreed with this plan and thanked me for the callback.    LATOSHA Bruner MD, MS  Endocrinology  [facility]    CC:  CHAYO

## 2022-06-13 ASSESSMENT — PATIENT HEALTH QUESTIONNAIRE - PHQ9
10. IF YOU CHECKED OFF ANY PROBLEMS, HOW DIFFICULT HAVE THESE PROBLEMS MADE IT FOR YOU TO DO YOUR WORK, TAKE CARE OF THINGS AT HOME, OR GET ALONG WITH OTHER PEOPLE: SOMEWHAT DIFFICULT
SUM OF ALL RESPONSES TO PHQ QUESTIONS 1-9: 1
SUM OF ALL RESPONSES TO PHQ QUESTIONS 1-9: 1

## 2022-06-14 ENCOUNTER — OFFICE VISIT (OUTPATIENT)
Dept: FAMILY MEDICINE | Facility: CLINIC | Age: 77
End: 2022-06-14
Payer: MEDICARE

## 2022-06-14 VITALS
HEART RATE: 57 BPM | SYSTOLIC BLOOD PRESSURE: 145 MMHG | TEMPERATURE: 97.1 F | WEIGHT: 205.7 LBS | HEIGHT: 60 IN | BODY MASS INDEX: 40.38 KG/M2 | OXYGEN SATURATION: 100 % | DIASTOLIC BLOOD PRESSURE: 62 MMHG

## 2022-06-14 DIAGNOSIS — I10 BENIGN ESSENTIAL HYPERTENSION: ICD-10-CM

## 2022-06-14 DIAGNOSIS — I70.1 RENAL ARTERY STENOSIS (H): Primary | ICD-10-CM

## 2022-06-14 PROCEDURE — 99213 OFFICE O/P EST LOW 20 MIN: CPT | Mod: 25 | Performed by: INTERNAL MEDICINE

## 2022-06-14 PROCEDURE — 90471 IMMUNIZATION ADMIN: CPT | Performed by: INTERNAL MEDICINE

## 2022-06-14 PROCEDURE — 90714 TD VACC NO PRESV 7 YRS+ IM: CPT | Performed by: INTERNAL MEDICINE

## 2022-06-14 ASSESSMENT — PATIENT HEALTH QUESTIONNAIRE - PHQ9
10. IF YOU CHECKED OFF ANY PROBLEMS, HOW DIFFICULT HAVE THESE PROBLEMS MADE IT FOR YOU TO DO YOUR WORK, TAKE CARE OF THINGS AT HOME, OR GET ALONG WITH OTHER PEOPLE: SOMEWHAT DIFFICULT
SUM OF ALL RESPONSES TO PHQ QUESTIONS 1-9: 1

## 2022-06-14 ASSESSMENT — PAIN SCALES - GENERAL: PAINLEVEL: EXTREME PAIN (8)

## 2022-06-14 NOTE — PATIENT INSTRUCTIONS
(I70.1) Renal artery stenosis (H)  (primary encounter diagnosis)  Comment: We discussed that diruetics were indicated to help with blood pressure control and we will resume lasix 20 mg daily  Plan:     (I10) Benign essential hypertension  Comment: as above - plan to follow up in 1 month and also try to schedule with nephrology as well.   No labs today   Plan:

## 2022-06-14 NOTE — PROGRESS NOTES
Raheem Cochran is a 76 year old who presents for the following health issues     History of Present Illness       Hypertension: She presents for follow up of hypertension.  She does not check blood pressure  regularly outside of the clinic. Outside blood pressures have been over 140/90. She does not follow a low salt diet.      Today's PHQ-9         PHQ-9 Total Score: 1    PHQ-9 Q9 Thoughts of better off dead/self-harm past 2 weeks :   Not at all    How difficult have these problems made it for you to do your work, take care of things at home, or get along with other people: Somewhat difficult        Renal artery stenosis (H)  Benign essential hypertension   Chasity Hodgson returns to the clinic today for hypertension.  At our last office visit we discussed reesuming lasix.  She has not needed it other than one dose 3 days ago.  She has been checking her blood pressure a home and blood pressure readings were stable, but elevated in the 160-170's.  She is taking amlodipine, atenolol, lisinopril.  She has been following in nephrology.  She was recommended to start lasix 20 mg daily basis, but has not yet done this.       Review of Systems   Constitutional, HEENT, cardiovascular, pulmonary, GI - continues to have gastroesophageal reflux symptoms and taking famotidine, , musculoskeletal, neuro, skin, endocrine and psych systems are negative, except as otherwise noted.      Objective    BP (!) 145/62 (BP Location: Right arm, Patient Position: Sitting, Cuff Size: Adult Large)   Pulse 57   Temp 97.1  F (36.2  C) (Temporal)   Ht 1.524 m (5')   Wt 93.3 kg (205 lb 11.2 oz)   SpO2 100%   BMI 40.17 kg/m    There is no height or weight on file to calculate BMI.  Physical Exam   GENERAL: healthy, alert and no distress  EYES: Eyes grossly normal to inspection,    HENT: ear canals and TM's normal,   NECK: no adenopathy, no asymmetry   RESP: lungs clear to auscultation - no rales, rhonchi or wheezes  CV: regular rate  and rhythm, normal S1 S2, no S3 or S4, no murmur, 2+ edema   ABDOMEN: soft, nontender, no hepatosplenomegaly  MS: no gross musculoskeletal defects noted, 2+ edema  SKIN: no suspicious lesions or rashes  NEURO: Normal strength and tone  PSYCH: mentation appears normal, affect normal/bright      Patient Instructions   (I70.1) Renal artery stenosis (H)  (primary encounter diagnosis)  Comment: We discussed that diruetics were indicated to help with blood pressure control and we will resume lasix 20 mg daily  Plan:     (I10) Benign essential hypertension  Comment: as above - plan to follow up in 1 month and also try to schedule with nephrology as well.   No labs today   Plan:

## 2022-06-14 NOTE — NURSING NOTE
Prior to immunization administration, verified patients identity using patient s name and date of birth. Please see Immunization Activity for additional information.     Screening Questionnaire for Adult Immunization    Are you sick today?   No   Do you have allergies to medications, food, a vaccine component or latex?   No   Have you ever had a serious reaction after receiving a vaccination?   No   Do you have a long-term health problem with heart, lung, kidney, or metabolic disease (e.g., diabetes), asthma, a blood disorder, no spleen, complement component deficiency, a cochlear implant, or a spinal fluid leak?  Are you on long-term aspirin therapy?   Yes   Do you have cancer, leukemia, HIV/AIDS, or any other immune system problem?   No   Do you have a parent, brother, or sister with an immune system problem?   No   In the past 3 months, have you taken medications that affect  your immune system, such as prednisone, other steroids, or anticancer drugs; drugs for the treatment of rheumatoid arthritis, Crohn s disease, or psoriasis; or have you had radiation treatments?   Don't Know   Have you had a seizure, or a brain or other nervous system problem?   No   During the past year, have you received a transfusion of blood or blood    products, or been given immune (gamma) globulin or antiviral drug?   No   For women: Are you pregnant or is there a chance you could become       pregnant during the next month?   No   Have you received any vaccinations in the past 4 weeks?   No     Immunization questionnaire was positive for at least one answer.  Notified Dr. Benoit.        Per orders of Dr. Benoit, injection of TD given by Roslyn Quintero MA. Patient instructed to remain in clinic for 15 minutes afterwards, and to report any adverse reaction to me immediately.       Screening performed by Roslyn Quintero MA on 6/14/2022 at 1:56 PM.

## 2022-06-22 ENCOUNTER — ALLIED HEALTH/NURSE VISIT (OUTPATIENT)
Dept: EDUCATION SERVICES | Facility: CLINIC | Age: 77
End: 2022-06-22
Payer: MEDICARE

## 2022-06-22 DIAGNOSIS — N18.30 STAGE 3 CHRONIC KIDNEY DISEASE, UNSPECIFIED WHETHER STAGE 3A OR 3B CKD (H): ICD-10-CM

## 2022-06-22 DIAGNOSIS — E66.01 MORBID OBESITY (H): ICD-10-CM

## 2022-06-22 DIAGNOSIS — E11.9 TYPE 2 DIABETES, HBA1C GOAL < 7% (H): ICD-10-CM

## 2022-06-22 PROCEDURE — 97802 MEDICAL NUTRITION INDIV IN: CPT | Performed by: DIETITIAN, REGISTERED

## 2022-06-22 NOTE — PROGRESS NOTES
"MEDICAL NUTRITION THERAPY  Visit Type:Initial assessment and intervention    SUBJECTIVE:   Chasity Hodgson presents today for MNT and education related to weight management.   She is accompanied by self.     PATIENT STATED GOAL(S) FOR THIS VISIT: \"I want to lose some weight\"    EATING HABITS:   Breakfast: 2 eggs, 2 pcs english muffin bread, small glass juice OR oatmeal w/ brown sugar, raspberries, 1% milk OR 2 slices eng muffin bread w/ smart balance  Lunch: 2 hawaiian buns with pb and jelly, carrot sticks, milk  Dinner: 1.5 cups chicken pasta salad, SF jello, peach/pear cup OR 1 large pizza slice, 1 fried chicken wing  Snacks: cheese sticks  Beverages: Milk 1/day and Water     Misses meals? Sometimes lunch  Eats out:  2 meals/per week (Chik scott-a chicken tenders)    Previous diet education:  Yes     Food allergies/intolerances: high fiber (has gastroparesis)    EXERCISE: no regular exercise program (doing PT)    SOCIO/ECONOMIC:   Lives with: self    OBJECTIVE:   Vitals: There were no vitals taken for this visit.    Weight Change: stable around 205#    ASSESSMENT:   Reviewed diet and exercise recommendations for weight loss.  Patient notes also struggling with gastroparesis; she has not seen a dietitian for this.  Provided patient education handout on gastroparesis but informed patient that this condition is outside my scope.     Reviewed recommendation for 4-6 small meals per day.  Recommended <30gm carb per meal (low fiber, low fat).  Recommend 15gm carb snacks between meals.  Reviewed food recall and made suggestions reduce caloric intake.  Answered patient's questions about daily carb intake and sodium.      Encouraged exercise as patient is able.  She is interested in doing more chair exercises.    Recommended Energy Intake: 1200 kcal  VERBAL AND WRITTEN INFORMATION GIVEN TO SUPPORT:  Discussed: general nutrition guidelines, weight reduction, consistent meals, labeling, sodium, exercise, fiber and portion " control.  Education Materials Provided: gastroparesis, carb counting    PLAN:   PATIENT'S BEHAVIOR CHANGE GOALS:   See Patient Instructions for patient stated behavior change goals. AVS was printed and given to patient at today's appointment.    FOLLOW UP:   Follow up with RD as needed.      ANH Menjivar    Time spent in minutes: 45  Encounter: Individual

## 2022-06-22 NOTE — LETTER
"    6/22/2022         RE: Chasity Hodgson  [address] [address] [facility] [facility] [place] [address]        Dear Colleague,    Thank you for referring your patient, Chasity Hodgson, to the [facility]. Please see a copy of my visit note below.    MEDICAL NUTRITION THERAPY  Visit Type:Initial assessment and intervention    SUBJECTIVE:   Chasity Hodgson presents today for MNT and education related to weight management.   She is accompanied by self.     PATIENT STATED GOAL(S) FOR THIS VISIT: \"I want to lose some weight\"    EATING HABITS:   Breakfast: 2 eggs, 2 pcs english muffin bread, small glass juice OR oatmeal w/ brown sugar, raspberries, 1% milk OR 2 slices eng muffin bread w/ smart balance  Lunch: 2 hawaiian buns with pb and jelly, carrot sticks, milk  Dinner: 1.5 cups chicken pasta salad, SF jello, peach/pear cup OR 1 large pizza slice, 1 fried chicken wing  Snacks: cheese sticks  Beverages: Milk 1/day and Water     Misses meals? Sometimes lunch  Eats out:  2 meals/per week (Chik scott-a chicken tenders)    Previous diet education:  Yes     Food allergies/intolerances: high fiber (has gastroparesis)    EXERCISE: no regular exercise program (doing PT)    SOCIO/ECONOMIC:   Lives with: self    OBJECTIVE:   Vitals: There were no vitals taken for this visit.    Weight Change: stable around 205#    ASSESSMENT:   Reviewed diet and exercise recommendations for weight loss.  Patient notes also struggling with gastroparesis; she has not seen a dietitian for this.  Provided patient education handout on gastroparesis but informed patient that this condition is outside my scope.     Reviewed recommendation for 4-6 small meals per day.  Recommended <30gm carb per meal (low fiber, low fat).  Recommend 15gm carb snacks between meals.  Reviewed food recall and made suggestions reduce caloric intake.  Answered patient's questions about daily carb intake and sodium.      Encouraged exercise as patient is able.  She is interested in " doing more chair exercises.    Recommended Energy Intake: 1200 kcal  VERBAL AND WRITTEN INFORMATION GIVEN TO SUPPORT:  Discussed: general nutrition guidelines, weight reduction, consistent meals, labeling, sodium, exercise, fiber and portion control.  Education Materials Provided: gastroparesis, carb counting    PLAN:   PATIENT'S BEHAVIOR CHANGE GOALS:   See Patient Instructions for patient stated behavior change goals. AVS was printed and given to patient at today's appointment.    FOLLOW UP:   Follow up with RD as needed.      ANH Menjivar CDCES    Time spent in minutes: 45  Encounter: Individual

## 2022-06-22 NOTE — PATIENT INSTRUCTIONS
Medical Nutrition Therapy    My Healthy Eating and Activity Goals:      Count carbohydrates and have 30 carbohydrates at each meal and 15 carbohydrates at snacks  Eat 4-6 times per day (smaller amounts) for gastroparesis  Choose low fat, low fiber foods  Limit eating out to <1 times per week  Exercise as able (chair exercises, etc)

## 2022-07-06 NOTE — PROGRESS NOTES
Sammie is a 76 year old who is being evaluated via a billable video visit.    Is Pt currently in [place]? Yes    NOTE:  If Pt is not in [place], Appointment needs to be canceled and rescheduled.  How would you like to obtain your AVS? MyChart  If the video visit is dropped, the invitation should be resent by:   Will anyone else be joining your video visit? Katie Edwards CMA   [facility]   Pain Management Center    Video-Visit Details  Video Start Time: 11:48 AM  Type of service:  Video Visit  Video End Time:1208  Originating Location (pt. Location): Home  Distant Location (provider location):  [facility] [place] Platform used for Video Visit: [facility] [place]      Date of visit: 7/7/2022    Chief complaint:   Chief Complaint   Patient presents with     Pain       Interval history:  Chasity Hodgson is a 76 year old female last seen by me for INITIAL CONSULT on 6/2/2022. Last FOLLOW UP - today is the first follow up. .        Since her last visit, Chasity Hodgson reports:    - Doing okay  - She is having terrible upper back pain on her right side.   - The Lyrica has been helpful for her pain. She had one episode when she felt lightheaded and weird after taking it. She went to PT and had to head home right after it because she wasn't feeling well. She was unsure if it was the Lyrica that caused this.   - She has only been taking lyrica once a day at night to avoid this happening again.   - She has a muscle relaxant and tried that one night and it wasn't as helpful as the lyrica.  Tizanidine 4mg PRN. She got this from [facility] Dr. Sousa.   -The patient describes severe aching pain in her lower back and left upper side of back above her waist.  She also has extreme aching pain in her left knee and a bad ache in her left thigh, hip and groin area.  She also complains of a bad aching pain in her left shoulder and upper arm.  - A  lot of walking makes her pain unbearable specifically trying to go upstairs.  Laying down with her legs elevated helps to relieve the pain as does sitting in her recliner.  She also uses ice and heat which is helpful.  - She was on gabapentin for many years and had side effects of sedation.  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   -She has been seeing a lot of different providers for her pain including a spine surgeon and orthopedic surgeon and physical medicine and rehab doctor.  They have treated her using things like physical therapy and injection therapy but have not managed her medications.   - Seeing Sharon Cook DO with [facility].  PM&R physician.   - Seeing Dr. Sousa at [facility] spine.  Last follow up was 5/6/2022  - S/P left total hip replacement 9/14/2020 with Dr. Henderson at [facility]. She sees him for left hip and knee pain.  Appt on June 6th for cortisone injection left knee.   Had a left knee MRI 4/27/22  - Dr. Ferrera at [facility] for left shoulder pain.   - LEFT L3-4 transforaminal epidural steroid injection at [facility] May 12th, 2022    - Doing PT at [facility] for greater trochanteric bursitis   - Diagnosed with polio when she was 3 years old.        [place]  REVIEWED TODAY: YES  LYRICA 25MG #60 6/2/2022  NORCO 5MG #15 4/5/2022  OXYCODONE 5MG #5 3/28/2022  GABAPENTIN 100MG #90 3/18/2022    MEDICATIONS FOR PAIN:   LYRICA 25MG BID  TYLENOL PRN     INJECTIONS/SURGERY:  She recently had a lumbar transforaminal epidural steroid injection done at [facility] on May 12 this was a left L3 4 injection.       IMAGING:  LUMBAR MRI 4/21/2022:        Medications:  Current Outpatient Medications   Medication Sig Dispense Refill     amLODIPine (NORVASC) 2.5 MG tablet Take 2.5mg in AM, 2.5mg at noon, and 5mg at bedtime 360 tablet 3     aspirin (ASA) 81 MG EC tablet Take 81 mg by mouth daily       atenolol (TENORMIN) 50 MG tablet TAKE ONE TABLET BY  MOUTH TWICE A  tablet 2     azelastine (ASTEPRO) 0.15 % nasal spray 1 spray       Cholecalciferol (VITAMIN D-3 PO) Take 2 tablets by mouth       clotrimazole (LOTRIMIN) 1 % cream Apply topically daily       Continuous Blood Gluc Sensor (FREESTYLE BRANDY 14 DAY SENSOR) MISC Apply 1 sensor and change every 14 days. 2 each 11     diphenhydrAMINE-acetaminophen (TYLENOL PM)  MG tablet Take 1 tablet by mouth At Bedtime Reported on 3/20/2017       ezetimibe (ZETIA) 10 MG tablet TAKE ONE TABLET BY MOUTH ONCE DAILY 90 tablet 3     famotidine (PEPCID) 20 MG tablet Take 2 tablets (40 mg) by mouth as needed 180 tablet 3     fenofibrate (TRICOR) 145 MG tablet TAKE ONE TABLET BY MOUTH ONCE DAILY 90 tablet 3     ferrous sulfate (IRON) 325 (65 FE) MG tablet Take 325 mg by mouth daily (with breakfast)       fexofenadine (ALLEGRA) 180 MG tablet Take 180 mg by mouth daily. 120 0     fluocinolone acetonide (DERMA SMOOTHE/FS BODY) 0.01 % external oil Apply 2 mL to scalp once per week. Massage into scalp. Can be left in overnight or washed out after 4-6 hours. 118.28 mL 5     fluticasone (FLONASE) 50 MCG/ACT spray Spray 2 sprays in nostril daily 2 sprays in each nostril qd 1 Bottle 0     furosemide (LASIX) 20 MG tablet Take 1 tablet (20 mg) by mouth daily 90 tablet 1     glipiZIDE (GLUCOTROL XL) 5 MG 24 hr tablet Take 1 tablet (5 mg) by mouth daily 90 tablet 3     lisinopril (ZESTRIL) 40 MG tablet TAKE ONE TABLET BY MOUTH ONCE DAILY 90 tablet 3     MULTIVITAMINS OR TABS ONE DAILY 100 3     nitroGLYcerin (NITROSTAT) 0.4 MG sublingual tablet Place 1 tablet (0.4 mg) under the tongue every 5 minutes as needed for chest pain (no more than 3 in one hour; after 3rd, call 911.) 25 tablet 3     nystatin (MYCOSTATIN) cream Apply topically daily as needed        nystatin-triamcinolone (MYCOLOG II) cream Apply topically daily as needed        ondansetron (ZOFRAN) 4 MG tablet Take 1 tablet by mouth every 6 hours as needed Reported on  3/20/2017       order for DME Equipment being ordered: Compression stockings - Knee High; 20-30 mmHg compression - note would like adhesive band to keep the stocking from sliding down 3 each 0     order for DME Equipment being ordered: Oral appliance for sleep apnea 1 Units 0     pantoprazole (PROTONIX) 40 MG EC tablet Take 40 mg by mouth 2 times daily       pregabalin (LYRICA) 25 MG capsule Take 1 capsule (25 mg) by mouth 2 times daily 60 capsule 0     sitagliptin (JANUVIA) 50 MG tablet Take 1 tablet (50 mg) by mouth daily 90 tablet 3     SYNTHROID 112 MCG tablet Take 1 tablet (112 mcg) by mouth daily Take 112 mcg daily except for Friday takes only 56 mcg.  Brand name Synthroid 90 tablet 3     TRESIBA FLEXTOUCH 100 UNIT/ML pen Inject 32 Units Subcutaneous every morning 5 mL 11     tretinoin (RETIN-A) 0.025 % external cream Use every night as tolerated - spot treat lesion 20 g 3         Review of Systems:  ROS:  Constitutional, neuro, ENT, endocrine, pulmonary, cardiac, gastrointestinal, genitourinary, musculoskeletal, integument and psychiatric systems are negative, except as otherwise noted.    Physical Exam:  not currently breastfeeding.      GENERAL: Healthy, alert and no distress  EYES: Eyes grossly normal to inspection.  No discharge or erythema, or obvious scleral/conjunctival abnormalities.  RESP: No audible wheeze, cough, or visible cyanosis.  No visible retractions or increased work of breathing.    SKIN: Visible skin clear. No significant rash, abnormal pigmentation or lesions.  NEURO: Cranial nerves grossly intact.  Mentation and speech appropriate for age.  PSYCH: Mentation appears normal, affect normal/bright, judgement and insight intact, normal speech and appearance well-groomed.         ASSESSMENT/PLAN:     1. Chronic pain syndrome  The patient has chronic widespread pain.  Her most significant pain is low back and leg pain related to her severe central stenosis and neurogenic claudication.  She is  currently seeing a spine doctor and orthopedic doctor and a physical medicine and rehab doctor for this pain and surgery and injection therapy are being considered.  She is fond of these doctors and thinks they are doing a good job. Because of this I offered to simply discuss medication options for treating her pain today.     Last month we discontinued gabapentin and started low dose lyrica.  She had a day where she felt like she may be having side effects so have not been taking it twice a day and only at night.  She is feeling some pain relief.  Plan is to continue 25mg but increase to BID.  If she continues to have side effects she will send me a mychart.      I also discussed Cymbalta as another alternative for her.  Because I do not want to add 2 medications at once we can discuss this in the furture.      She should continue to use her topical medications which are somewhat helpful for her.  These include Salonpas patches and icy hot.     Continue your physical therapy at [facility].     2. Neuropathic pain  Continue Lyrica 25mg BID      - pregabalin (LYRICA) 25 MG capsule; Take 1 capsule (25 mg) by mouth 2 times daily  Dispense: 60 capsule; Refill: 0         MEDICATIONS:   Orders Placed This Encounter   Medications     pregabalin (LYRICA) 25 MG capsule     Sig: Take 1 capsule (25 mg) by mouth 2 times daily     Dispense:  60 capsule     Refill:  1       FOLLOW UP:  9/1/2022 @ 1130AM VIDEO VISIT        BILLING TIME DOCUMENTATION:   The total TIME spent on this patient on the date of the encounter/appointment was 29 minutes.      TOTAL TIME includes:   Time spent preparing to see the patient (reviewing records and tests) - 2 min  Time spent face to face (or over the phone) with the patient - 20 min  Time spent ordering tests, medications, procedures and referrals - 1 min  Time spent Referring and communicating with other healthcare professionals - 0 min  Time spent documenting clinical information in Epic -  6 min      ANDREA ADDISON MD   Pain Management & Addiction Medicine

## 2022-07-07 ENCOUNTER — VIRTUAL VISIT (OUTPATIENT)
Dept: PALLIATIVE MEDICINE | Facility: CLINIC | Age: 77
End: 2022-07-07
Payer: MEDICARE

## 2022-07-07 DIAGNOSIS — G89.4 CHRONIC PAIN SYNDROME: Primary | ICD-10-CM

## 2022-07-07 DIAGNOSIS — M79.2 NEUROPATHIC PAIN: ICD-10-CM

## 2022-07-07 PROCEDURE — 99213 OFFICE O/P EST LOW 20 MIN: CPT | Mod: 95 | Performed by: ANESTHESIOLOGY

## 2022-07-07 RX ORDER — PREGABALIN 25 MG/1
25 CAPSULE ORAL 2 TIMES DAILY
Qty: 60 CAPSULE | Refills: 1 | Status: SHIPPED | OUTPATIENT
Start: 2022-07-07 | End: 2022-10-12

## 2022-07-07 ASSESSMENT — PAIN SCALES - GENERAL: PAINLEVEL: EXTREME PAIN (8)

## 2022-07-07 NOTE — PATIENT INSTRUCTIONS
Continue Lyrica 25mg twice a day.  We can increase this if tolerated.     You can take tizanidine at the same time, however, I recommend that you only take this medication for muscle pain at night as it can be very sedating.     Follow up has been scheduled.     Laura Church MD

## 2022-07-12 ENCOUNTER — OFFICE VISIT (OUTPATIENT)
Dept: FAMILY MEDICINE | Facility: CLINIC | Age: 77
End: 2022-07-12
Payer: MEDICARE

## 2022-07-12 VITALS
WEIGHT: 211.9 LBS | RESPIRATION RATE: 20 BRPM | SYSTOLIC BLOOD PRESSURE: 136 MMHG | BODY MASS INDEX: 41.6 KG/M2 | DIASTOLIC BLOOD PRESSURE: 82 MMHG | HEART RATE: 56 BPM | HEIGHT: 60 IN | TEMPERATURE: 97.3 F | OXYGEN SATURATION: 100 %

## 2022-07-12 DIAGNOSIS — F32.0 MILD MAJOR DEPRESSION (H): ICD-10-CM

## 2022-07-12 DIAGNOSIS — C73 PAPILLARY CARCINOMA OF THYROID (H): ICD-10-CM

## 2022-07-12 DIAGNOSIS — I10 BENIGN ESSENTIAL HYPERTENSION: Primary | ICD-10-CM

## 2022-07-12 DIAGNOSIS — Z79.4 TYPE 2 DIABETES MELLITUS WITH OTHER SPECIFIED COMPLICATION, WITH LONG-TERM CURRENT USE OF INSULIN (H): ICD-10-CM

## 2022-07-12 DIAGNOSIS — E11.69 TYPE 2 DIABETES MELLITUS WITH OTHER SPECIFIED COMPLICATION, WITH LONG-TERM CURRENT USE OF INSULIN (H): ICD-10-CM

## 2022-07-12 DIAGNOSIS — I87.8 VENOUS STASIS: ICD-10-CM

## 2022-07-12 LAB
ERYTHROCYTE [DISTWIDTH] IN BLOOD BY AUTOMATED COUNT: 12.1 % (ref 10–15)
HCT VFR BLD AUTO: 35.9 % (ref 35–47)
HGB BLD-MCNC: 11.7 G/DL (ref 11.7–15.7)
MCH RBC QN AUTO: 30.1 PG (ref 26.5–33)
MCHC RBC AUTO-ENTMCNC: 32.6 G/DL (ref 31.5–36.5)
MCV RBC AUTO: 92 FL (ref 78–100)
PLATELET # BLD AUTO: 221 10E3/UL (ref 150–450)
RBC # BLD AUTO: 3.89 10E6/UL (ref 3.8–5.2)
WBC # BLD AUTO: 7.7 10E3/UL (ref 4–11)

## 2022-07-12 PROCEDURE — 36415 COLL VENOUS BLD VENIPUNCTURE: CPT | Performed by: INTERNAL MEDICINE

## 2022-07-12 PROCEDURE — 99214 OFFICE O/P EST MOD 30 MIN: CPT | Performed by: INTERNAL MEDICINE

## 2022-07-12 PROCEDURE — 85027 COMPLETE CBC AUTOMATED: CPT | Performed by: INTERNAL MEDICINE

## 2022-07-12 PROCEDURE — 80048 BASIC METABOLIC PNL TOTAL CA: CPT | Performed by: INTERNAL MEDICINE

## 2022-07-12 ASSESSMENT — PAIN SCALES - GENERAL: PAINLEVEL: SEVERE PAIN (7)

## 2022-07-12 NOTE — PROGRESS NOTES
Raheem Cochran is a 76 year old, presenting for the following health issues:  Follow Up      History of Present Illness       Back Pain:  She presents for follow up of back pain. Patient's back pain is a chronic problem.  Location of back pain:  Right lower back, left middle of back, right upper back, right buttock and right hip  Description of back pain: sharp and other  Back pain spreads: nowhere    Since patient first noticed back pain, pain is: rapidly worsening  Does back pain interfere with her job:  Not applicable      CKD: She uses over the counter pain medication, including Tylenol Extra Strength, three times daily.    Hypertension: She presents for follow up of hypertension.  She does check blood pressure  regularly outside of the clinic. Outside blood pressures have been over 140/90. She follows a low salt diet.         Hypertension Follow-up      Do you check your blood pressure regularly outside of the clinic? Yes     Are you following a low salt diet? Yes    Are your blood pressures ever more than 140 on the top number (systolic) OR more   than 90 on the bottom number (diastolic), for example 140/90? Yes   Has not been taking lasix every day, due to urinary frequency and incontinence.  She did take this yesterday and due for recheck of labs.       Chronic Kidney Disease Follow-up    Do you take any over the counter pain medicine?: Yes  What over the counter medicine are you taking for your pain?:  Aspirin 81 mg       How often do you take this medicine?:  One time daily     She notes that she is noticing worsening fluid retention and venous stasis with edema.  She is watching her sodium closely      Review of Systems   Constitutional, HEENT, cardiovascular, pulmonary, GI, , musculoskeletal, neuro, skin, endocrine and psych systems are negative, except as otherwise noted.      Objective    /82 (BP Location: Right arm, Patient Position: Sitting, Cuff Size: Adult Large)   Pulse 56   Temp  97.3  F (36.3  C) (Temporal)   Resp 20   Ht 1.524 m (5')   Wt 96.1 kg (211 lb 14.4 oz)   SpO2 100%   Breastfeeding No   BMI 41.38 kg/m    Body mass index is 41.38 kg/m .  Physical Exam   GENERAL: healthy, alert and no distress  EYES: Eyes grossly normal to inspectio   HENT:  nose and mouth without ulcers or lesions  NECK: no adenopathy, no asymmetry,   RESP: lungs clear to auscultation - no rales, rhonchi or wheezes  CV: regular rate and rhythm, normal S1 S2, no S3 or S4, no murmur, click or rub, 2+ peripheral edema, venous insufficiency  ABDOMEN: obese, soft, nontender,    MS: no gross musculoskeletal defects noted, no edema  SKIN: + left leg venous stasis   NEURO: Normal strength and tone,   PSYCH: mentation appears normal, affect normal/bright    Patient Instructions   (I10) Benign essential hypertension  (primary encounter diagnosis)  Comment: blood pressure is much improved and we will continue with lasix at current dose.  Recheck basic metabolic panel including potassium  Plan: Basic metabolic panel  (Ca, Cl, CO2, Creat,         Gluc, K, Na, BUN)            (F32.0) Mild major depression (H)  Comment: Doing well - however given recent changes with back pain could consider addition of Duloxetine.  For now, continue with tizanidine which was recently added by spine surgery team.  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  Recommend follow up in endocrinology and recheck in August  Plan:     (C73) Papillary carcinoma of thyroid (H)  Comment: also stable - follow up in endocrinology - continue levothyroxine  Plan:     Shingrix vaccine is now available.  I would call your insurance to see if a shingles vaccine is covered and get this at your pharmacy                  .  ..

## 2022-07-12 NOTE — PATIENT INSTRUCTIONS
(I10) Benign essential hypertension  (primary encounter diagnosis)  Comment: blood pressure is much improved and we will continue with lasix at current dose.  Recheck basic metabolic panel including potassium  Plan: Basic metabolic panel  (Ca, Cl, CO2, Creat,         Gluc, K, Na, BUN)            (F32.0) Mild major depression (H)  Comment: Doing well - however given recent changes with back pain could consider addition of Duloxetine.  For now, continue with tizanidine which was recently added by spine surgery team.  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  Recommend follow up in endocrinology and recheck in August  Plan:     (C73) Papillary carcinoma of thyroid (H)  Comment: also stable - follow up in endocrinology - continue levothyroxine  Plan:     Shingrix vaccine is now available.  I would call your insurance to see if a shingles vaccine is covered and get this at your pharmacy

## 2022-07-13 ENCOUNTER — TELEPHONE (OUTPATIENT)
Dept: FAMILY MEDICINE | Facility: CLINIC | Age: 77
End: 2022-07-13

## 2022-07-13 LAB
ANION GAP SERPL CALCULATED.3IONS-SCNC: 3 MMOL/L (ref 3–14)
BUN SERPL-MCNC: 29 MG/DL (ref 7–30)
CALCIUM SERPL-MCNC: 9.2 MG/DL (ref 8.5–10.1)
CHLORIDE BLD-SCNC: 107 MMOL/L (ref 94–109)
CO2 SERPL-SCNC: 28 MMOL/L (ref 20–32)
CREAT SERPL-MCNC: 1.05 MG/DL (ref 0.52–1.04)
GFR SERPL CREATININE-BSD FRML MDRD: 55 ML/MIN/1.73M2
GLUCOSE BLD-MCNC: 148 MG/DL (ref 70–99)
POTASSIUM BLD-SCNC: 4.8 MMOL/L (ref 3.4–5.3)
SODIUM SERPL-SCNC: 138 MMOL/L (ref 133–144)

## 2022-07-13 NOTE — TELEPHONE ENCOUNTER
Diabetes Education Scheduling Outreach #1:    Call to patient to schedule. Patient declined to schedule.    Floresita Lutz  [facility]  Diabetes and Nutrition Scheduling

## 2022-07-15 NOTE — RESULT ENCOUNTER NOTE
Gerard Gaspar,    I have had the opportunity to review your recent results and an interpretation is as follows:  Your blood counts all returned within normal limits  Your metabolic panel shows stable kidney function    Sincerely,  Shola Benoit MD

## 2022-07-18 DIAGNOSIS — N18.31 CHRONIC KIDNEY DISEASE (CKD) STAGE G3A/A1, MODERATELY DECREASED GLOMERULAR FILTRATION RATE (GFR) BETWEEN 45-59 ML/MIN/1.73 SQUARE METER AND ALBUMINURIA CREATININE RATIO LESS THAN 30 MG/G (H): Primary | ICD-10-CM

## 2022-07-18 DIAGNOSIS — I10 ESSENTIAL HYPERTENSION, MALIGNANT: ICD-10-CM

## 2022-07-20 ENCOUNTER — TRANSFERRED RECORDS (OUTPATIENT)
Dept: FAMILY MEDICINE | Facility: CLINIC | Age: 77
End: 2022-07-20

## 2022-07-22 ENCOUNTER — MYC MEDICAL ADVICE (OUTPATIENT)
Dept: FAMILY MEDICINE | Facility: CLINIC | Age: 77
End: 2022-07-22

## 2022-07-25 ENCOUNTER — TRANSFERRED RECORDS (OUTPATIENT)
Dept: FAMILY MEDICINE | Facility: CLINIC | Age: 77
End: 2022-07-25

## 2022-07-25 ENCOUNTER — NURSE TRIAGE (OUTPATIENT)
Dept: FAMILY MEDICINE | Facility: CLINIC | Age: 77
End: 2022-07-25

## 2022-07-25 ENCOUNTER — OFFICE VISIT (OUTPATIENT)
Dept: URGENT CARE | Facility: URGENT CARE | Age: 77
End: 2022-07-25
Payer: MEDICARE

## 2022-07-25 VITALS
TEMPERATURE: 97.2 F | SYSTOLIC BLOOD PRESSURE: 138 MMHG | OXYGEN SATURATION: 100 % | RESPIRATION RATE: 16 BRPM | HEART RATE: 63 BPM | DIASTOLIC BLOOD PRESSURE: 86 MMHG

## 2022-07-25 DIAGNOSIS — I95.9 HYPOTENSION, UNSPECIFIED HYPOTENSION TYPE: Primary | ICD-10-CM

## 2022-07-25 PROCEDURE — 99213 OFFICE O/P EST LOW 20 MIN: CPT | Performed by: FAMILY MEDICINE

## 2022-07-25 NOTE — TELEPHONE ENCOUNTER
"Writer called patient to triage symptoms reported in recent [employer] message on 7/22/22.    Description: \"I get these spells and all of a sudden I feel lightheaded and I need to sit down\"  Lightheaded: yes  Vertigo: denies   Severity: moderate, interfering with normal activities   Onset: a couple of weeks ago   Aggravating factors: denies   Heart rate: denies   Cause: unknown  Recurrent symptom: denies   Other symptoms: DENIES fever, chest pain, vomiting, diarrhea, bleeding    Patient states she has been staying well hydrated and resting.     Triaged per Epic Protocol, patient to be seen in office today. No appointments available at clinic. Writer advised patient to be seen in  now and to call 911 with any worsening symptoms. Patient expressed verbal understanding and agreeable to go to [facility] now. No further questions/concerns.     Shravan Pollard RN  [facility]    Reason for Disposition    Lightheadedness (dizziness) present now, after 2 hours of rest and fluids    Additional Information    Negative: Shock suspected (e.g., cold/pale/clammy skin, too weak to stand, low BP, rapid pulse)    Negative: Difficult to awaken or acting confused (e.g., disoriented, slurred speech)    Negative: Fainted, and still feels dizzy afterwards    Negative: Severe difficulty breathing (e.g., struggling for each breath, speaks in single words)    Negative: Overdose (accidental or intentional) of medications    Negative: New neurologic deficit that is present now: * Weakness of the face, arm, or leg on one side of the body * Numbness of the face, arm, or leg on one side of the body * Loss of speech or garbled speech    Negative: Heart beating < 50 beats per minute OR > 140 beats per minute    Negative: Sounds like a life-threatening emergency to the triager    Negative: Chest pain    Negative: Rectal bleeding, bloody stool, or tarry-black stool    Negative: Vomiting is the main symptom    Negative: Diarrhea is " the main symptom    Negative: Headache is the main symptom    Negative: Patient states that he/she is having an anxiety/panic attack    Negative: Heat exhaustion suspected (i.e., dehydration from heat exposure)    Negative: SEVERE dizziness (e.g., unable to stand, requires support to walk, feels like passing out now)    Negative: SEVERE headache or neck pain    Negative: Spinning or tilting sensation (vertigo) present now and one or more stroke risk factors (i.e., hypertension, diabetes, prior stroke/TIA, heart attack, age over 60) (Exception: prior physician evaluation for this AND no different/worse than usual)    Negative: Loss of vision or double vision    Negative: Extra heart beats OR irregular heart beating (i.e., 'palpitations')    Negative: Difficulty breathing    Negative: Drinking very little and has signs of dehydration (e.g., no urine > 12 hours, very dry mouth, very lightheaded)    Negative: Follows bleeding (e.g., stomach, rectum, vagina) (Exception: became dizzy from sight of small amount blood)    Negative: Patient sounds very sick or weak to the triager    Protocols used: DIZZINESS-A-[place]

## 2022-08-01 ENCOUNTER — LAB (OUTPATIENT)
Dept: LAB | Facility: CLINIC | Age: 77
End: 2022-08-01
Payer: MEDICARE

## 2022-08-01 DIAGNOSIS — I10 ESSENTIAL HYPERTENSION, MALIGNANT: ICD-10-CM

## 2022-08-01 DIAGNOSIS — N18.31 CHRONIC KIDNEY DISEASE (CKD) STAGE G3A/A1, MODERATELY DECREASED GLOMERULAR FILTRATION RATE (GFR) BETWEEN 45-59 ML/MIN/1.73 SQUARE METER AND ALBUMINURIA CREATININE RATIO LESS THAN 30 MG/G (H): ICD-10-CM

## 2022-08-01 PROCEDURE — 36415 COLL VENOUS BLD VENIPUNCTURE: CPT

## 2022-08-01 PROCEDURE — 80048 BASIC METABOLIC PNL TOTAL CA: CPT

## 2022-08-02 ENCOUNTER — MEDICAL CORRESPONDENCE (OUTPATIENT)
Dept: HEALTH INFORMATION MANAGEMENT | Facility: CLINIC | Age: 77
End: 2022-08-02

## 2022-08-02 LAB
ANION GAP SERPL CALCULATED.3IONS-SCNC: 7 MMOL/L (ref 3–14)
BUN SERPL-MCNC: 39 MG/DL (ref 7–30)
CALCIUM SERPL-MCNC: 9.4 MG/DL (ref 8.5–10.1)
CHLORIDE BLD-SCNC: 105 MMOL/L (ref 94–109)
CO2 SERPL-SCNC: 28 MMOL/L (ref 20–32)
CREAT SERPL-MCNC: 1.37 MG/DL (ref 0.52–1.04)
GFR SERPL CREATININE-BSD FRML MDRD: 40 ML/MIN/1.73M2
GLUCOSE BLD-MCNC: 182 MG/DL (ref 70–99)
POTASSIUM BLD-SCNC: 4.8 MMOL/L (ref 3.4–5.3)
SODIUM SERPL-SCNC: 140 MMOL/L (ref 133–144)

## 2022-08-03 ENCOUNTER — TRANSFERRED RECORDS (OUTPATIENT)
Dept: FAMILY MEDICINE | Facility: CLINIC | Age: 77
End: 2022-08-03

## 2022-08-11 ENCOUNTER — OFFICE VISIT (OUTPATIENT)
Dept: FAMILY MEDICINE | Facility: CLINIC | Age: 77
End: 2022-08-11
Payer: MEDICARE

## 2022-08-11 VITALS
DIASTOLIC BLOOD PRESSURE: 55 MMHG | WEIGHT: 209.9 LBS | SYSTOLIC BLOOD PRESSURE: 155 MMHG | HEART RATE: 60 BPM | OXYGEN SATURATION: 100 % | HEIGHT: 60 IN | BODY MASS INDEX: 41.21 KG/M2

## 2022-08-11 DIAGNOSIS — H26.9 CATARACT, UNSPECIFIED CATARACT TYPE, UNSPECIFIED LATERALITY: ICD-10-CM

## 2022-08-11 DIAGNOSIS — Z01.818 PREOP GENERAL PHYSICAL EXAM: Primary | ICD-10-CM

## 2022-08-11 PROCEDURE — 99213 OFFICE O/P EST LOW 20 MIN: CPT | Performed by: NURSE PRACTITIONER

## 2022-08-11 NOTE — PATIENT INSTRUCTIONS
Preparing for Your Surgery  Getting started  A nurse will call you to review your health history and instructions. They will give you an arrival time based on your scheduled surgery time. Please be ready to share:  Your doctor's clinic name and phone number  Your medical, surgical and anesthesia history  A list of allergies and sensitivities  A list of medicines, including herbal treatments and over-the-counter drugs  Whether the patient has a legal guardian (ask how to send us the papers in advance)  Please tell us if you're pregnant--or if there's any chance you might be pregnant. Some surgeries may injure a fetus (unborn baby), so they require a pregnancy test. Surgeries that are safe for a fetus don't always need a test, and you can choose whether to have one.   If you have a child who's having surgery, please ask for a copy of Preparing for Your Child's Surgery.    Preparing for surgery  Within 30 days of surgery: Have a pre-op exam (sometimes called an H&P, or History and Physical). This can be done at a clinic or pre-operative center.  If you're having a , you may not need this exam. Talk to your care team.  At your pre-op exam, talk to your care team about all medicines you take. If you need to stop any medicines before surgery, ask when to start taking them again.  We do this for your safety. Many medicines can make you bleed too much during surgery. Some change how well surgery (anesthesia) drugs work.  Call your insurance company to let them know you're having surgery. (If you don't have insurance, call [phone].)  Call your clinic if there's any change in your health. This includes signs of a cold or flu (sore throat, runny nose, cough, rash, fever). It also includes a scrape or scratch near the surgery site.  If you have questions on the day of surgery, call your hospital or surgery center.  COVID testing  You may need to be tested for COVID-19 before having surgery. If so, we will give  you instructions.  Eating and drinking guidelines  For your safety: Unless your surgeon tells you otherwise, follow the guidelines below.  Eat and drink as usual until 8 hours before surgery. After that, no food or milk.  Drink clear liquids until 2 hours before surgery. These are liquids you can see through, like water, Gatorade and Propel Water. You may also have black coffee and tea (no cream or milk).  Nothing by mouth within 2 hours of surgery. This includes gum, candy and breath mints.  If you drink alcohol: Stop drinking it the night before surgery.  If your care team tells you to take medicine on the morning of surgery, it's okay to take it with a sip of water.  Preventing infection  Shower or bathe the night before and morning of your surgery. Follow the instructions your clinic gave you. (If no instructions, use regular soap.)  Don't shave or clip hair near your surgery site. We'll remove the hair if needed.  Don't smoke or vape the morning of surgery. You may chew nicotine gum up to 2 hours before surgery. A nicotine patch is okay.  Note: Some surgeries require you to completely quit smoking and nicotine. Check with your surgeon.  Your care team will make every effort to keep you safe from infection. We will:  Clean our hands often with soap and water (or an alcohol-based hand rub).  Clean the skin at your surgery site with a special soap that kills germs.  Give you a special gown to keep you warm. (Cold raises the risk of infection.)  Wear special hair covers, masks, gowns and gloves during surgery.  Give antibiotic medicine, if prescribed. Not all surgeries need antibiotics.  What to bring on the day of surgery  Photo ID and insurance card  Copy of your health care directive, if you have one  Glasses and hearing aides (bring cases)  You can't wear contacts during surgery  Inhaler and eye drops, if you use them (tell us about these when you arrive)  CPAP machine or breathing device, if you use them  A  few personal items, if spending the night  If you have . . .  A pacemaker, ICD (cardiac defibrillator) or other implant: Bring the ID card.  An implanted stimulator: Bring the remote control.  A legal guardian: Bring a copy of the certified (court-stamped) guardianship papers.  Please remove any jewelry, including body piercings. Leave jewelry and other valuables at home.  If you're going home the day of surgery  You must have a responsible adult drive you home. They should stay with you overnight as well.  If you don't have someone to stay with you, and you aren't safe to go home alone, we may keep you overnight. Insurance often won't pay for this.  After surgery  If it's hard to control your pain or you need more pain medicine, please call your surgeon's office.  Questions?   If you have any questions for your care team, list them here: _________________________________________________________________________________________________________________________________________________________________________ ____________________________________ ____________________________________ ____________________________________  For informational purposes only. Not to replace the advice of your health care provider. Copyright   2003, 2019 [employer]. All rights reserved. Clinically reviewed by Radha Cleary MD. [employer] 314387 - REV 07/21.    Hold glipizide the morning of surgery    Hold januvia for three days prior to surgery    Take half dose of tresiba the morning of surgery     Hold all vitamins and supplements the morning of surgery

## 2022-08-11 NOTE — PROGRESS NOTES
[address], [address]  [facility] [address]  Phone: [phone]  Primary Provider: Shola Benoit  Pre-op Performing Provider: CHRISSIE SALCIDO      PREOPERATIVE EVALUATION:  Today's date: 8/11/2022    Chasity Hodgson is a 76 year old female who presents for a preoperative evaluation.    Surgical Information:  Surgery/Procedure: Cataract Surgery  Surgery Location: [facility]        Surgeon: Dr. Ramirez Schmid  Surgery Date: 8/25/2022  Time of Surgery: 1:00 pm  Where patient plans to recover: At home with family  Fax number for surgical facility: Note does not need to be faxed, will be available electronically in Epic.    Type of Anesthesia Anticipated: General    Assessment & Plan     The proposed surgical procedure is considered LOW risk.    Problem List Items Addressed This Visit    None     Visit Diagnoses     Preop general physical exam    -  Primary    Cataract, unspecified cataract type, unspecified laterality                   Risks and Recommendations:  The patient has the following additional risks and recommendations for perioperative complications:   - No identified additional risk factors other than previously addressed    Medication Instructions:  Patient is to take all scheduled medications on the day of surgery EXCEPT for modifications listed below:  Hold glipizide the morning of surgery    Hold januvia for three days prior to surgery    Take half dose of tresiba the morning of surgery     Hold all vitamins and supplements the morning of surgery       RECOMMENDATION:  APPROVAL GIVEN to proceed with proposed procedure, without further diagnostic evaluation.          Subjective     HPI related to upcoming procedure: Cataract Surgery, Left Eye  Pt reports back pain and spasms  Denies recent illness  No CP or SOB  BP has been up and down  Reports BPs of 100s/50s, does feel dizzy     Preop Questions 8/9/2022   1. Have you ever had a  heart attack or stroke? No   2. Have you ever had surgery on your heart or blood vessels, such as a stent placement, a coronary artery bypass, or surgery on an artery in your head, neck, heart, or legs? No   3. Do you have chest pain with activity? No   4. Do you have a history of  heart failure? No   5. Do you currently have a cold, bronchitis or symptoms of other infection? No   6. Do you have a cough, shortness of breath, or wheezing? No   7. Do you or anyone in your family have previous history of blood clots? YES - DVT in 1999 due to PICC resulting in PE   8. Do you or does anyone in your family have a serious bleeding problem such as prolonged bleeding following surgeries or cuts? No   9. Have you ever had problems with anemia or been told to take iron pills? YES - takes multivitamin with iron   10. Have you had any abnormal blood loss such as black, tarry or bloody stools, or abnormal vaginal bleeding? No   11. Have you ever had a blood transfusion? YES - 1980 with hysterectomy   11a. Have you ever had a transfusion reaction? No   12. Are you willing to have a blood transfusion if it is medically needed before, during, or after your surgery? Yes   13. Have you or any of your relatives ever had problems with anesthesia? No   14. Do you have sleep apnea, excessive snoring or daytime drowsiness? YES - ANNETTE no CPAP, sleep study coming up   14a. Do you have a CPAP machine? No   15. Do you have any artifical heart valves or other implanted medical devices like a pacemaker, defibrillator, or continuous glucose monitor? No   16. Do you have artificial joints? YES -    17. Are you allergic to latex? No       Health Care Directive:  Patient has a Health Care Directive on file      Preoperative Review of :   reviewed - controlled substances reflected in medication list.      Status of Chronic Conditions:  See problem list for active medical problems.  Problems all longstanding and stable, except as  noted/documented.  See ROS for pertinent symptoms related to these conditions.      Review of Systems  Constitutional, neuro, ENT, endocrine, pulmonary, cardiac, gastrointestinal, genitourinary, musculoskeletal, integument and psychiatric systems are negative, except as otherwise noted.    Patient Active Problem List    Diagnosis Date Noted     Mild major depression (H) 07/12/2022     Priority: Medium     Neuropathic pain 06/02/2022     Priority: Medium     Renal artery stenosis (H) 07/31/2020     Priority: Medium     CKD (chronic kidney disease) stage 3, GFR 30-59 ml/min (H) 04/17/2019     Priority: Medium     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     Priority: Medium     Morbid obesity (H) 08/01/2018     Priority: Medium     Normocytic anemia 01/16/2017     Priority: Medium     Allergic dermatitis due to other chemical product 10/02/2016     Priority: Medium     Insufficiency, arterial, peripheral (H) 11/08/2015     Priority: Medium     Mild seen on CARMITA 11/2015 - right sided       Type 2 diabetes mellitus with other specified complication (H) 10/18/2015     Priority: Medium     Carotid stenosis 12/09/2014     Priority: Medium     Mild increased stenosis 50-69% left ICA       Right shoulder pain 12/09/2014     Priority: Medium     Left knee pain 11/03/2014     Priority: Medium     Poliomyelitis      Priority: Medium     poor circulation right leg       Diabetic gastroparesis (H)      Priority: Medium     Dermatitis 02/15/2014     Priority: Medium     Acne rosacea 02/15/2014     Priority: Medium     Esophageal reflux 01/23/2014     Priority: Medium     Had upper endoscopy - Dr. Pantoja 2013       Pulmonary nodule 09/06/2013     Priority: Medium     Alopecia 02/09/2013     Priority: Medium     Problem list name updated by automated process. Provider to review       Papillary carcinoma of thyroid (H)      Priority: Medium     s/p thyroidectomy - Ruegemer       Gastroparesis 11/12/2012     Priority: Medium      Postsurgical hypothyroidism      Priority: Medium     [place] [facility] [place]cern for possible recurrence 03/2014       Type 2 diabetes, HbA1c goal < 7% (H)      Priority: Medium     ACP (advance care planning) 09/21/2012     Priority: Medium     Discussed advance care planning with patient; information given to patient to review. 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 Provider to review       ANNETTE (obstructive sleep apnea)      Priority: Medium     Mixed hyperlipidemia 10/31/2010     Priority: Medium     Low back pain 07/15/2009     Priority: Medium      Past Medical History:   Diagnosis Date     Abdominal adhesions 1984, 96,99    s/p lysis     Abdominal adhesions      Acne rosacea      Allergic rhinitis      Allergic rhinitis, cause unspecified      Alopecia      Anemia      CAD (coronary artery disease)      Carotid stenosis      CKD (chronic kidney disease) stage 2, GFR 60-89 ml/min      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)      CPAP (continuous positive airway pressure) dependence      Depression      Diabetic gastroparesis (H)      Diet-controlled type 2 diabetes mellitus (H)      DM2 (diabetes mellitus, type 2) (H)      DVT (deep venous thrombosis) (H)      DVT of axillary vein, acute right (H)      Fibromyalgia      Gastro-oesophageal reflux disease      GERD (gastroesophageal reflux disease)      Hernia of unspecified site of abdominal cavity without mention of obstruction or gangrene     bilateral     History of  blood transfusion     10/ 1980     HTN (hypertension)      HTN (hypertension)      Hypertriglyceridemia      Hypertriglyceridemia      Hypokalemia      Hypothyroidism      Obstructive sleep apnea      ANNETTE (obstructive sleep apnea)      ANNETTE on CPAP      Osteoarthritis of glenohumeral joint      Papillary carcinoma of thyroid (H)     s/p thyroidectomy - Ruegemer     Papillary carcinoma of thyroid (H)      PE (pulmonary embolism)      Poliomyelitis     poor circulation right leg     Poliomyelitis      Postsurgical hypothyroidism     s/p papillary thryoid cancer - Ruegemer     Pulmonary embolism (H)      Pulmonary embolus (H)      Pulmonary nodule      Rosacea      S/P carpal tunnel release     bilateral     S/P hardware removal 01/2014    still with lingering foot pain     S/P shoulder surgery     bilateral     Septic joint (H)     right knee     Septic joint of right knee joint (H)      Venous insufficiency      Venous insufficiency      Venous thrombosis 1999    right axillary vein     Past Surgical History:   Procedure Laterality Date     AMPUTATE TOE(S)  3/15/2012    Procedure:AMPUTATE TOE(S); Surgeon:RUBEN MOSES; Location: OR     AMPUTATE TOE(S)       APPENDECTOMY  1972     APPENDECTOMY       ARTHRODESIS FOOT  3/15/2012    Procedure:ARTHRODESIS FOOT; RIGHT TRIPLE ARTHRODESIS, FIFTH TOE AMPUTATION, LATERAL LIGAMENT RECONSTRUCTION, TENDON TRANSFER AND RELEASE [MINI C-ARM, ARTHREX 4.5 AND 6.7 STAPLES, BIOCOMPOSITE TENODESIS SCREWS]; Surgeon:RUBEN MOSES; Location:[facility] OR     ARTHRODESIS FOOT Right     Right foot triple arthrodesis and removal of hardware     ARTHROSCOPY SHOULDER  06/25/2015    REVISION SUBACROMIAL DECOMPRESSION, EXCISION OF GANGLION CYST, DEBRIDEMENT AND EXCISION OF THE GLENOHUMERAL JOINT GANGLION CYST, CORACOID DECOMPRESSION, POSSIBLE SUBSCAPULARIS REPAIR AND OPEN SUBSCAPULARIS BICEP     ARTHROSCOPY SHOULDER ROTATOR CUFF REPAIR       BIOPSY  Thyroid 2002     BREAST SURGERY   Biopsy     CHOLECYSTECTOMY       CHOLECYSTECTOMY       COLONOSCOPY  2018     COLOSTOMY  2/7/2012    Procedure:COLOSTOMY; CREATION OF SIGMOID COLOSTOMY AND EXTENSIVE  LYSIS OF ADHESIONS; Surgeon:MONTSERRAT BENDER; Location:[facility] OR     COLOSTOMY       EYE SURGERY       GENITOURINARY SURGERY  1999     GI SURGERY      weakened rectal sphincter with artificial stimulator     HERNIA REPAIR  1976     HYSTERECTOMY TOTAL ABDOMINAL       LAPAROTOMY, LYSIS ADHESIONS, COMBINED  2/7/2012    Procedure:COMBINED LAPAROTOMY, LYSIS ADHESIONS; Surgeon:MONTSERRAT BENDER; Location:[facility] OR     RELEASE CARPAL TUNNEL       RELEASE TENDON FOOT  3/15/2012    Procedure:RELEASE TENDON FOOT; Surgeon:SUKHDEEP METZ; Location: OR     REMOVE HARDWARE FOOT  12/13/2012    Procedure: REMOVE HARDWARE FOOT;  RIGHT FOOT REMOVAL OF HARDWARE;  Surgeon: Sukhdeep Metz MD;  Location:  OR     SHOULDER SURGERY  11/12/2020    LEFT SHOULDER HEMIARHTROPLASTY, BICEP TENODESIS     SOFT TISSUE SURGERY  2018, 2020     TENDON RELEASE      foot     THYROIDECTOMY       ZZC FREEING BOWEL ADHESION,ENTEROLYSIS      1986, 1996, 1999     ZZC NONSPECIFIC PROCEDURE      throidectomy     ZZC TOTAL ABDOM HYSTERECTOMY  1980    + BSO     Current Outpatient Medications   Medication Sig Dispense Refill     amLODIPine (NORVASC) 2.5 MG tablet Take 2.5mg in AM, 2.5mg at noon, and 5mg at bedtime 360 tablet 3     aspirin (ASA) 81 MG EC tablet Take 81 mg by mouth daily       atenolol (TENORMIN) 50 MG tablet TAKE ONE TABLET BY MOUTH TWICE A  tablet 2     azelastine (ASTEPRO) 0.15 % nasal spray 1 spray       Cholecalciferol (VITAMIN D-3 PO) Take 2 tablets by mouth       clotrimazole (LOTRIMIN) 1 % cream Apply topically daily       Continuous Blood Gluc Sensor (FREESTYLE BRANDY 14 DAY SENSOR) [facility] [place] Apply 1 sensor and change every 14 days. 2 each 11     diphenhydrAMINE-acetaminophen (TYLENOL PM)  MG tablet Take 1 tablet by mouth At Bedtime Reported on  3/20/2017 (Patient not taking: Reported on 7/25/2022)       ezetimibe (ZETIA) 10 MG tablet TAKE ONE TABLET BY MOUTH ONCE DAILY (Patient not taking: Reported on 7/25/2022) 90 tablet 3     famotidine (PEPCID) 20 MG tablet Take 2 tablets (40 mg) by mouth as needed 180 tablet 3     fenofibrate (TRICOR) 145 MG tablet TAKE ONE TABLET BY MOUTH ONCE DAILY 90 tablet 3     ferrous sulfate (IRON) 325 (65 FE) MG tablet Take 325 mg by mouth daily (with breakfast) (Patient not taking: Reported on 7/25/2022)       fexofenadine (ALLEGRA) 180 MG tablet Take 180 mg by mouth daily. 120 0     fluocinolone acetonide (DERMA SMOOTHE/FS BODY) 0.01 % external oil Apply 2 mL to scalp once per week. Massage into scalp. Can be left in overnight or washed out after 4-6 hours. 118.28 mL 5     fluticasone (FLONASE) 50 MCG/ACT spray Spray 2 sprays in nostril daily 2 sprays in each nostril qd 1 Bottle 0     furosemide (LASIX) 20 MG tablet Take 1 tablet (20 mg) by mouth daily 90 tablet 1     glipiZIDE (GLUCOTROL XL) 5 MG 24 hr tablet Take 1 tablet (5 mg) by mouth daily 90 tablet 3     lisinopril (ZESTRIL) 40 MG tablet TAKE ONE TABLET BY MOUTH ONCE DAILY 90 tablet 3     MULTIVITAMINS OR TABS ONE DAILY 100 3     nitroGLYcerin (NITROSTAT) 0.4 MG sublingual tablet Place 1 tablet (0.4 mg) under the tongue every 5 minutes as needed for chest pain (no more than 3 in one hour; after 3rd, call 911.) (Patient not taking: Reported on 7/25/2022) 25 tablet 3     nystatin (MYCOSTATIN) cream Apply topically daily as needed        nystatin-triamcinolone (MYCOLOG II) cream Apply topically daily as needed        ondansetron (ZOFRAN) 4 MG tablet Take 1 tablet by mouth every 6 hours as needed Reported on 3/20/2017       order for DME Equipment being ordered: Compression stockings - Knee High; 20-30 mmHg compression - note would like adhesive band to keep the stocking from sliding down 3 each 0     order for DME Equipment being ordered: Oral appliance for sleep apnea 1  Units 0     pantoprazole (PROTONIX) 40 MG EC tablet Take 40 mg by mouth 2 times daily       pregabalin (LYRICA) 25 MG capsule Take 1 capsule (25 mg) by mouth 2 times daily 60 capsule 1     sitagliptin (JANUVIA) 50 MG tablet Take 1 tablet (50 mg) by mouth daily 90 tablet 3     SYNTHROID 112 MCG tablet Take 1 tablet (112 mcg) by mouth daily Take 112 mcg daily except for Friday takes only 56 mcg.  Brand name Synthroid 90 tablet 3     tiZANidine (ZANAFLEX) 4 MG tablet Take 4 mg by mouth every 6 hours as needed       TRESIBA FLEXTOUCH 100 UNIT/ML pen Inject 32 Units Subcutaneous every morning 5 mL 11     tretinoin (RETIN-A) 0.025 % external cream Use every night as tolerated - spot treat lesion (Patient not taking: Reported on 7/25/2022) 20 g 3       Allergies   Allergen Reactions     Nsaids Difficulty breathing     Increased creatinine     Toradol Difficulty breathing     Shortness of breath     Celecoxib Itching and Rash     Codeine Itching     With higher doses     Crestor [Rosuvastatin] Muscle Pain (Myalgia)     No Clinical Screening - See Comments Itching     Fragrance     Vioxx Other (See Comments)     Heart races     Conjugated Estrogens Rash     Sulfa Drugs Rash        Social History     Tobacco Use     Smoking status: Never Smoker     Smokeless tobacco: Never Used   Substance Use Topics     Alcohol use: Never     Family History   Problem Relation Age of Onset     Arthritis Mother      Hypertension Mother      Cerebrovascular Disease Mother      Obesity Mother      Heart Disease Mother         MI's     Hypertension Father      Respiratory Father         Adult RDS     Diabetes Father      Arthritis Sister      Cancer Sister      Diabetes Sister      Hypertension Sister      Breast Cancer Sister      Depression Sister      Thyroid Disease Sister      Obesity Sister      Arthritis Sister      Hypertension Sister      Thyroid Disease Sister      Osteoporosis Sister      Obesity Sister      Cancer Sister         colon  polup     Heart Disease Brother         MI at 54     Other Cancer Brother         Lung & bone     Hypertension Sister      Osteoporosis Sister      Obesity Sister      Colon Cancer Sister      Lipids Brother      Hypertension Brother      Diabetes Brother      Hyperlipidemia Brother      Lipids Sister      Obesity Sister      Obesity Maternal Grandmother         Dad s mother     Skin Cancer Maternal Grandmother         skin cancer unknown     Cancer Maternal Grandmother         unknown skin cancer on face     Obesity Paternal Grandmother         Mothers mother     History   Drug Use Unknown         Objective     There were no vitals taken for this visit.    Physical Exam    GENERAL APPEARANCE: healthy, alert and no distress     EYES: EOMI, PERRL     RESP: lungs clear to auscultation - no rales, rhonchi or wheezes     CV: regular rates and rhythm, normal S1 S2, no S3 or S4 and no murmur, click or rub     MS: extremities normal- no gross deformities noted, no evidence of inflammation in joints, FROM in all extremities.     SKIN: no suspicious lesions or rashes     NEURO: Normal strength and tone, sensory exam grossly normal, mentation intact and speech normal     PSYCH: mentation appears normal. and affect normal/bright    Recent Labs   Lab Test 08/01/22  1422 07/12/22  1127 05/17/22  1559 05/06/22  1409 03/31/22  1450 01/11/22  1359   HGB  --  11.7  --   --  12.5 11.7   PLT  --  221  --   --  263 217    138 138  --  137 136   POTASSIUM 4.8 4.8 4.9  --  4.7 4.7   CR 1.37* 1.05* 1.10*   < > 1.28* 1.07*   A1C  --   --  8.2*  --   --  9.6*    < > = values in this interval not displayed.        Diagnostics:  No labs were ordered during this visit.   No EKG required for low risk surgery (cataract, skin procedure, breast biopsy, etc).    Revised Cardiac Risk Index (RCRI):  The patient has the following serious cardiovascular risks for perioperative complications:   - Coronary Artery Disease (MI, positive stress test,  angina, Qs on EKG) = 1 point   - Diabetes Mellitus (on Insulin) = 1 point     RCRI Interpretation: 2 points: Class III (moderate risk - 6.6% complication rate)     Estimated Functional Capacity: Performs 4 METS exercise without symptoms (e.g., light housework, stairs, 4 mph walk, 7 mph bike, slow step dance)           Signed Electronically by: MAGALY Shannon CNP  Copy of this evaluation report is provided to requesting physician.    I saw this patient in collaboration with Pippa Wild      I was present with the MAGALY/PA student who participated in the service and in the documentation of the services provided. I have verified the history and personally performed the physical exam and medical decision making, as documented by the student and edited by me.     MAGALY Diggs, CNP    [employer]ippa Wild NP Student

## 2022-08-17 ENCOUNTER — TELEPHONE (OUTPATIENT)
Dept: FAMILY MEDICINE | Facility: CLINIC | Age: 77
End: 2022-08-17

## 2022-08-17 ENCOUNTER — TELEPHONE (OUTPATIENT)
Dept: ENDOCRINOLOGY | Facility: CLINIC | Age: 77
End: 2022-08-17

## 2022-08-17 NOTE — TELEPHONE ENCOUNTER
Called patient. Told patient if she still has no symptoms tomorrow and Friday it is OK for her to come into clinic for her appt on Friday. Told patient if she starts to develop symptoms, we can switch to video appointment.     Anitha Orellana, [facility]

## 2022-08-17 NOTE — TELEPHONE ENCOUNTER
Pt was exposed to her daughter this past Saturday. Her daughter just informed her that she tested positive for covid. Pt denies any new or concerning symptoms right now except she has had nausea on and off for a few months now and has seen GI doctor about this as well. Pt notes she has endocrinology appointment on Friday and cataract surgery next week. She will test herself post exposure and call endocrinology office to see if they want to see her virtually or if they have any other precautions they want her to do before her appointment. She also stated understanding if she is positive or experiences any new or concerning symptoms she will call the clinic and speak to a nurse. She had no further questions at this time.     Jaqui BROWN RN  [facility]

## 2022-08-17 NOTE — TELEPHONE ENCOUNTER
[facility] Call Center    Phone Message    May a detailed message be left on voicemail: yes     Reason for Call: Other: .      Per Patient is wanting to get a call back. Patient states she was around her daughter on 08/13/2022 and states her daughter had tested positive for covid. Patient has an upcoming appt on 08/19/2022. Patient states she did do a covid test that came back negative, patient states she is going to do another one 08/18/2022 just in case. Patient is wanting to know if she is able to still be seen. Please advise.     Action Taken: Message routed to:  [facility] Endo    Travel Screening: Not Applicable

## 2022-08-18 ASSESSMENT — ENCOUNTER SYMPTOMS
RECTAL PAIN: 1
ORTHOPNEA: 1
EYE IRRITATION: 1
WEIGHT GAIN: 1
BOWEL INCONTINENCE: 0
SKIN CHANGES: 0
DYSURIA: 0
PARALYSIS: 0
NAUSEA: 1
TREMORS: 0
DIFFICULTY URINATING: 0
SPEECH CHANGE: 0
HYPERTENSION: 1
SINUS CONGESTION: 0
SORE THROAT: 1
NECK PAIN: 1
VOMITING: 1
TASTE DISTURBANCE: 0
HEMATURIA: 0
DISTURBANCES IN COORDINATION: 0
EXERCISE INTOLERANCE: 0
MYALGIAS: 1
LOSS OF CONSCIOUSNESS: 0
NIGHT SWEATS: 1
FATIGUE: 1
MUSCLE WEAKNESS: 1
INCREASED ENERGY: 0
BLOOD IN STOOL: 0
PALPITATIONS: 0
ABDOMINAL PAIN: 1
ALTERED TEMPERATURE REGULATION: 1
HALLUCINATIONS: 0
HYPOTENSION: 1
ARTHRALGIAS: 1
HEADACHES: 1
SEIZURES: 0
DECREASED APPETITE: 0
DIARRHEA: 0
POLYDIPSIA: 1
NUMBNESS: 0
LEG PAIN: 1
FEVER: 0
CONSTIPATION: 0
NECK MASS: 1
EYE REDNESS: 0
EYE PAIN: 0
HOARSE VOICE: 0
SINUS PAIN: 0
JAUNDICE: 0
WEAKNESS: 1
MUSCLE CRAMPS: 1
NAIL CHANGES: 1
TINGLING: 0
HEARTBURN: 1
SLEEP DISTURBANCES DUE TO BREATHING: 0
EYE WATERING: 0
LIGHT-HEADEDNESS: 1
SYNCOPE: 0
CHILLS: 0
DIZZINESS: 1
SMELL DISTURBANCE: 0
STIFFNESS: 1
BACK PAIN: 1
POLYPHAGIA: 0
WEIGHT LOSS: 0
JOINT SWELLING: 0
MEMORY LOSS: 0
TROUBLE SWALLOWING: 1
BLOATING: 1
DOUBLE VISION: 0
FLANK PAIN: 1

## 2022-08-19 ENCOUNTER — OFFICE VISIT (OUTPATIENT)
Dept: ENDOCRINOLOGY | Facility: CLINIC | Age: 77
End: 2022-08-19
Payer: MEDICARE

## 2022-08-19 VITALS
DIASTOLIC BLOOD PRESSURE: 80 MMHG | SYSTOLIC BLOOD PRESSURE: 148 MMHG | OXYGEN SATURATION: 100 % | HEIGHT: 60 IN | HEART RATE: 62 BPM | BODY MASS INDEX: 41.23 KG/M2 | WEIGHT: 210 LBS

## 2022-08-19 DIAGNOSIS — E78.2 MIXED HYPERLIPIDEMIA: ICD-10-CM

## 2022-08-19 DIAGNOSIS — E11.9 TYPE 2 DIABETES, HBA1C GOAL < 7% (H): Primary | ICD-10-CM

## 2022-08-19 DIAGNOSIS — C73 PAPILLARY CARCINOMA OF THYROID (H): ICD-10-CM

## 2022-08-19 PROCEDURE — 99215 OFFICE O/P EST HI 40 MIN: CPT | Performed by: INTERNAL MEDICINE

## 2022-08-19 RX ORDER — ICOSAPENT ETHYL 1 G/1
1 CAPSULE ORAL 2 TIMES DAILY
Qty: 60 CAPSULE | Refills: 11 | Status: SHIPPED | OUTPATIENT
Start: 2022-08-19 | End: 2023-10-26

## 2022-08-19 ASSESSMENT — PAIN SCALES - GENERAL: PAINLEVEL: NO PAIN (0)

## 2022-08-19 NOTE — NURSING NOTE
Chief Complaint   Patient presents with     Diabetes       Vitals:    08/19/22 1025   BP: (!) 148/80   BP Location: Left arm   Patient Position: Sitting   Cuff Size: Adult Large   Pulse: 62   SpO2: 100%   Weight: 95.3 kg (210 lb)   Height: 1.524 m (5')       Body mass index is 41.01 kg/m .    Jacqueline Laird, [facility]

## 2022-08-19 NOTE — LETTER
8/19/2022         RE: Chasity Hodgson  [address]  [facility] [address]        Dear Colleague,    Thank you for referring your patient, Chasity Hodgson, to the [facility] [place]. Please see a copy of my visit note below.    Chasity Hodgson is a 76 year old yo female here for follow-up of Diabetes Mellitus, and PTC/post surgical hypothyrodisim. She also has past medical istory of ANNETTE, gastroparesis, obesity,  Last seen by me May 2022    INTERVAL HISTORY:  - Daughter has COVID, she overall is feeling well with negative tests  - Started glipizide, taking 5mg in evening, feels that it has helped some.   - Gastroparesis not particularly bothersome, no nausea. Eats small meals    1) Diabetes Mellitus    Diabetes History:  Diagnosis: 2015, picked up on routine labs  Hospitalizations: None  Previous Regimens: Farxia (difficulty with frequent urination), Toujeo (was working well but insurance formulary changed 1/1/2021) At highest was at 48u daily, and then had profound low. At time of discontinuing, was down to 5u of toujeo. Previously on metformin.   Current Regimen: Tresiba 32u daily, Sitagliptin (Januiva) 50mg daily, if BG <100 has been taking half (16u)    BG check- Freestyle Vini SMBG  Trends-   Overall high-- ave 193 (lower than 224 last time)            Complications: Gastroparesis- previously on reglan,     Last eye exam: Nov 22, 2021- no retinopathy, has cataracts  Foot Exam: [facility], checks every 60 days   Blood pressure- Lisinopril 40mg daily, Atenolol 50mg daily  Lipids- ezetimibe 10 mg daily, fenofibrate 145mg daily  SIOBHAN last 7/7- 25.52    2) Hypothyroidism s/p thyroidectomy for PTC  Diagnosis: known 3 nodules that were being monitored, and in 2002 s/p thyroidectomy (nodule in isthmus was cancer), s/p BUTLER (per patient, unclear if clean margins so received BUTLER, unknown dose)  Treatment: Levothyroxine 112mcg  Residual tissue on R that has been monitored, not growing.    Last ultrasound 10/2021- no tissue seen  last biomarkers 10/7/2021- TG <0.1, TGAb <0.4      INTERVAL HISTORY-- has felt enlarging L side of her neck, ? Lymph node  Stable for 4-5 months     3) Vitamin D Deficiency  - Taking 2 tab daily  - Most recent 7/7/2021- 73  - PTH 9 (9/16/2021)    4) Hypertriglyceridemia  - On ezetimibe and fenofibrate, tried statin before with myalgias  - Eats minimal processed foods, minimal fried foods/baked treats    BP Readings from Last 3 Encounters:   08/19/22 (!) 148/80   08/11/22 (!) 155/55   07/25/22 138/86       Lab Results   Component Value Date    A1C 9.6 01/11/2022    A1C 9.5 10/07/2021    A1C 9.0 08/02/2021    A1C 8.7 07/07/2021    A1C 6.2 11/27/2020    A1C 6.8 09/10/2019       Recent Labs   Lab Test 01/11/22  1359 03/29/19  1355 08/01/17  0000 02/15/17  0000 02/11/16  0000 03/31/15  1327   CHOL 177 196   < > 142   < > 158   HDL 34* 27*   < > 31   < > 34*   LDL 81 99   < > 49   < > 51   TRIG 308* 349*   < > 309   < > 365*   CHOLHDLRATIO  --   --   --  4.6  --  4.6    < > = values in this interval not displayed.       Lab Results   Component Value Date    MICROL 36 01/11/2022    MICROL 23 07/07/2021     No results found for: MICROALBUMIN      Wt Readings from Last 3 Encounters:   08/19/22 95.3 kg (210 lb)   08/11/22 95.2 kg (209 lb 14.4 oz)   07/12/22 96.1 kg (211 lb 14.4 oz)       Current Outpatient Medications   Medication Sig Dispense Refill     amLODIPine (NORVASC) 2.5 MG tablet Take 2.5mg in AM, 2.5mg at noon, and 5mg at bedtime 360 tablet 3     aspirin (ASA) 81 MG EC tablet Take 81 mg by mouth daily       atenolol (TENORMIN) 50 MG tablet TAKE ONE TABLET BY MOUTH TWICE A  tablet 2     azelastine (ASTEPRO) 0.15 % nasal spray 1 spray       Cholecalciferol (VITAMIN D-3 PO) Take 2 tablets by mouth       clotrimazole (LOTRIMIN) 1 % cream Apply topically daily       Continuous Blood Gluc Sensor (FREESTYLE BRANDY 14 DAY SENSOR) [facility] [place] Apply 1 sensor and change every 14 days.  2 each 11     diphenhydrAMINE-acetaminophen (TYLENOL PM)  MG tablet Take 1 tablet by mouth At Bedtime Reported on 3/20/2017       ezetimibe (ZETIA) 10 MG tablet TAKE ONE TABLET BY MOUTH ONCE DAILY 90 tablet 3     famotidine (PEPCID) 20 MG tablet Take 2 tablets (40 mg) by mouth as needed 180 tablet 3     fenofibrate (TRICOR) 145 MG tablet TAKE ONE TABLET BY MOUTH ONCE DAILY 90 tablet 3     ferrous sulfate (IRON) 325 (65 FE) MG tablet Take 325 mg by mouth daily (with breakfast)       fexofenadine (ALLEGRA) 180 MG tablet Take 180 mg by mouth daily. 120 0     fluocinolone acetonide (DERMA SMOOTHE/FS BODY) 0.01 % external oil Apply 2 mL to scalp once per week. Massage into scalp. Can be left in overnight or washed out after 4-6 hours. 118.28 mL 5     fluticasone (FLONASE) 50 MCG/ACT spray Spray 2 sprays in nostril daily 2 sprays in each nostril qd 1 Bottle 0     furosemide (LASIX) 20 MG tablet Take 1 tablet (20 mg) by mouth daily 90 tablet 1     glipiZIDE (GLUCOTROL XL) 5 MG 24 hr tablet Take 1 tablet (5 mg) by mouth daily 90 tablet 3     Icosapent Ethyl 1 g CAPS Take 1 g by mouth 2 times daily 60 capsule 11     lisinopril (ZESTRIL) 40 MG tablet TAKE ONE TABLET BY MOUTH ONCE DAILY 90 tablet 3     MULTIVITAMINS OR TABS ONE DAILY 100 3     nitroGLYcerin (NITROSTAT) 0.4 MG sublingual tablet Place 1 tablet (0.4 mg) under the tongue every 5 minutes as needed for chest pain (no more than 3 in one hour; after 3rd, call 911.) 25 tablet 3     nystatin (MYCOSTATIN) cream Apply topically daily as needed        nystatin-triamcinolone (MYCOLOG II) cream Apply topically daily as needed        ondansetron (ZOFRAN) 4 MG tablet Take 1 tablet by mouth every 6 hours as needed Reported on 3/20/2017       order for DME Equipment being ordered: Compression stockings - Knee High; 20-30 mmHg compression - note would like adhesive band to keep the stocking from sliding down 3 each 0     order for DME Equipment being ordered: Oral appliance  for sleep apnea 1 Units 0     pantoprazole (PROTONIX) 40 MG EC tablet Take 40 mg by mouth 2 times daily       pregabalin (LYRICA) 25 MG capsule Take 1 capsule (25 mg) by mouth 2 times daily 60 capsule 1     semaglutide (OZEMPIC) 2 MG/1.5ML SOPN pen Inject 0.25 mg subcutaneous weekly x 4 weeks, then 0.5 mg weekly 3 mL 1     SYNTHROID 112 MCG tablet Take 1 tablet (112 mcg) by mouth daily Take 112 mcg daily except for Friday takes only 56 mcg.  Brand name Synthroid 90 tablet 3     tiZANidine (ZANAFLEX) 4 MG tablet Take 4 mg by mouth every 6 hours as needed       TRESIBA FLEXTOUCH 100 UNIT/ML pen Inject 32 Units Subcutaneous every morning 5 mL 11     tretinoin (RETIN-A) 0.025 % external cream Use every night as tolerated - spot treat lesion 20 g 3       Histories reviewed and updated in Epic.  Past Medical History:   Diagnosis Date     Abdominal adhesions 1984, 96,99    s/p lysis     Abdominal adhesions      Acne rosacea      Allergic rhinitis      Allergic rhinitis, cause unspecified      Alopecia      Anemia      CAD (coronary artery disease)      Carotid stenosis      CKD (chronic kidney disease) stage 2, GFR 60-89 ml/min      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)      CPAP (continuous positive airway pressure) dependence      Depression      Diabetic gastroparesis (H)      Diet-controlled type 2 diabetes mellitus (H)      DM2 (diabetes mellitus, type 2) (H)      DVT (deep venous thrombosis) (H)      DVT of axillary vein, acute right (H)      Fibromyalgia      Gastro-oesophageal reflux disease      GERD (gastroesophageal reflux disease)      Hernia of unspecified site of abdominal cavity without mention of obstruction or gangrene     bilateral     History of blood transfusion     10/ 1980     HTN (hypertension)      HTN (hypertension)      Hypertriglyceridemia      Hypertriglyceridemia      Hypokalemia      Hypothyroidism      Obstructive sleep apnea      ANNETTE (obstructive sleep apnea)      ANNETTE on CPAP      Osteoarthritis of  glenohumeral joint      Papillary carcinoma of thyroid (H)     s/p thyroidectomy - Ruegemer     Papillary carcinoma of thyroid (H)      PE (pulmonary embolism)      Poliomyelitis     poor circulation right leg     Poliomyelitis      Postsurgical hypothyroidism     s/p papillary thryoid cancer - Ruegemer     Pulmonary embolism (H)      Pulmonary embolus (H)      Pulmonary nodule      Rosacea      S/P carpal tunnel release     bilateral     S/P hardware removal 01/2014    still with lingering foot pain     S/P shoulder surgery     bilateral     Septic joint (H)     right knee     Septic joint of right knee joint (H)      Venous insufficiency      Venous insufficiency      Venous thrombosis 1999    right axillary vein       Past Surgical History:   Procedure Laterality Date     AMPUTATE TOE(S)  3/15/2012    Procedure:AMPUTATE TOE(S); Surgeon:RUBEN MOSES; Location: OR     AMPUTATE TOE(S)       APPENDECTOMY  1972     APPENDECTOMY       ARTHRODESIS FOOT  3/15/2012    Procedure:ARTHRODESIS FOOT; RIGHT TRIPLE ARTHRODESIS, FIFTH TOE AMPUTATION, LATERAL LIGAMENT RECONSTRUCTION, TENDON TRANSFER AND RELEASE [MINI C-ARM, ARTHREX 4.5 AND 6.7 STAPLES, BIOCOMPOSITE TENODESIS SCREWS]; Surgeon:RUBEN MOSES; Location: OR     ARTHRODESIS FOOT Right     Right foot triple arthrodesis and removal of hardware     ARTHROSCOPY SHOULDER  06/25/2015    REVISION SUBACROMIAL DECOMPRESSION, EXCISION OF GANGLION CYST, DEBRIDEMENT AND EXCISION OF THE GLENOHUMERAL JOINT GANGLION CYST, CORACOID DECOMPRESSION, POSSIBLE SUBSCAPULARIS REPAIR AND OPEN SUBSCAPULARIS BICEP     ARTHROSCOPY SHOULDER ROTATOR CUFF REPAIR       BIOPSY  Thyroid 2002     BREAST SURGERY  Biopsy     CHOLECYSTECTOMY       CHOLECYSTECTOMY       COLONOSCOPY  2018     COLOSTOMY  2/7/2012    Procedure:COLOSTOMY; CREATION OF SIGMOID COLOSTOMY AND EXTENSIVE  LYSIS OF ADHESIONS; Surgeon:MONTSERRAT BENDER; Location: OR     COLOSTOMY       EYE SURGERY        GENITOURINARY SURGERY  1999     GI SURGERY      weakened rectal sphincter with artificial stimulator     HERNIA REPAIR  1976     HYSTERECTOMY TOTAL ABDOMINAL       LAPAROTOMY, LYSIS ADHESIONS, COMBINED  2/7/2012    Procedure:COMBINED LAPAROTOMY, LYSIS ADHESIONS; Surgeon:MONTSERRAT BENDER; Location:[facility] OR     RELEASE CARPAL TUNNEL       RELEASE TENDON FOOT  3/15/2012    Procedure:RELEASE TENDON FOOT; Surgeon:SUKHDEEP METZ; Location: OR     REMOVE HARDWARE FOOT  12/13/2012    Procedure: REMOVE HARDWARE FOOT;  RIGHT FOOT REMOVAL OF HARDWARE;  Surgeon: Sukhdeep Metz MD;  Location: [facility] OR     SHOULDER SURGERY  11/12/2020    LEFT SHOULDER HEMIARHTROPLASTY, BICEP TENODESIS     SOFT TISSUE SURGERY  2018, 2020     TENDON RELEASE      foot     THYROIDECTOMY       ZZC FREEING BOWEL ADHESION,ENTEROLYSIS      1986, 1996, 1999     ZZC NONSPECIFIC PROCEDURE      throidectomy     ZZC TOTAL ABDOM HYSTERECTOMY  1980    + BSO       Allergies:  Nsaids, Toradol, Celecoxib, Codeine, Crestor [rosuvastatin], No clinical screening - see comments, Vioxx, Conjugated estrogens, and Sulfa drugs    Social History     Tobacco Use     Smoking status: Never Smoker     Smokeless tobacco: Never Used   Substance Use Topics     Alcohol use: Never       Family History   Problem Relation Age of Onset     Arthritis Mother      Hypertension Mother      Cerebrovascular Disease Mother      Obesity Mother      Heart Disease Mother         MI's     Hypertension Father      Respiratory Father         Adult RDS     Diabetes Father      Arthritis Sister      Cancer Sister      Diabetes Sister      Hypertension Sister      Breast Cancer Sister      Depression Sister      Thyroid Disease Sister      Obesity Sister      Arthritis Sister      Hypertension Sister      Thyroid Disease Sister      Osteoporosis Sister      Obesity Sister      Cancer Sister         colon polup     Heart Disease Brother         MI at 54     Other Cancer Brother          Lung & bone     Hypertension Sister      Osteoporosis Sister      Obesity Sister      Colon Cancer Sister      Lipids Brother      Hypertension Brother      Diabetes Brother      Hyperlipidemia Brother      Lipids Sister      Obesity Sister      Obesity Maternal Grandmother         Dad s mother     Skin Cancer Maternal Grandmother         skin cancer unknown     Cancer Maternal Grandmother         unknown skin cancer on face     Obesity Paternal Grandmother         Mothers mother          REVIEW OF SYSTEMS:   ROS: 10 point ROS neg other than the symptoms noted above in the HPI.      EXAM:  Vitals: BP (!) 148/80 (BP Location: Left arm, Patient Position: Sitting, Cuff Size: Adult Large)   Pulse 62   Ht 1.524 m (5')   Wt 95.3 kg (210 lb)   SpO2 100%   BMI 41.01 kg/m      BMIE= Body mass index is 41.01 kg/m .  Exam:  Constitutional: healthy, alert, no acute distress, elderly female,   Head: Normocephalic. No masses, lesions, no exophthalmos/proptosis  ENT: no palpable thyroid, L side mobile LN palpable, firm  Respiratory: nonlabored, CTAB  Gastrointestinal: Abdomen soft, non-tender.  Musculoskeletal: extremities normal- no gross deformities noted, gait normal and normal muscle tone  Skin: no suspicious lesions or rashes  Neurologic: Gait slow with walker. sensation grossly intact  Psychiatric: mentation appears normal, calm      ASSESSMENT/PLAN:  No diagnosis found.  Orders Placed This Encounter   Procedures     US Head Neck Soft Tissue       1) Diabetes Mellitus   - Glucose Control- NOT at goal,   - Contiue Tresiba 32u every morning   - Continue Glipizide 5mg XR (given renal insufficiency),    - Discussed option of trial of GLP1-- does have gastroparesis but seems well managed/minimally impactful at this point. She would like the benefit of augmented weight loss so will try GLP1. IF she experiences worsening of her symptoms will stop.  Limited other choices-- urniary frequency with SGLT-2,  and GFR 43 so  hesitant to start metformin at this time point.    - STOP Januvia during trial with GLP1   - continue freestyle daryn for testing  - Cardiovascular Risk- continue ezetimibe, fenofibrate,   - Ophthalmology- uptodate, instructed to return Nov 2022, no NPDR.  - Podiatry- patient instructed on routine foot care, follows with podiatry  - Renal- Uptodate, SIOBHAN now continue lisinopril    2) Cardiovascular Risk-   - Continue ezetimibe, fenofibrate.   - Start icosapent ethyl 1g BID  - Continue aspirin 81mg daily     3) Hypothyroidism, postsurgical  - TSH/FT4 at goal  - Continue LT4 112mcg     4) History of Papillary Thyroid Cancer  - U/S and TG/TGab show biochemically and structurally complete response  - Now 20 years out  - enlarged LN, recheck ultrasound now.    RTC- 3 months    A total of 48 minutes were spent today 08/19/22 on this visit including chart review, history and counseling, documentation and other activities as detailed above.     Answers for HPI/ROS submitted by the patient on 8/18/2022  General Symptoms: Yes  Skin Symptoms: Yes  HENT Symptoms: Yes  EYE SYMPTOMS: Yes  HEART SYMPTOMS: Yes  LUNG SYMPTOMS: No  INTESTINAL SYMPTOMS: Yes  URINARY SYMPTOMS: Yes  GYNECOLOGIC SYMPTOMS: No  BREAST SYMPTOMS: No  SKELETAL SYMPTOMS: Yes  BLOOD SYMPTOMS: No  NERVOUS SYSTEM SYMPTOMS: Yes  MENTAL HEALTH SYMPTOMS: No  Ear pain: Yes  Ear discharge: No  Hearing loss: No  Tinnitus: No  Nosebleeds: No  Congestion: No  Sinus pain: No  Trouble swallowing: Yes   Voice hoarseness: No  Mouth sores: Yes  Sore throat: Yes  Tooth pain: No  Gum tenderness: No  Bleeding gums: No  Change in taste: No  Change in sense of smell: No  Dry mouth: Yes  Hearing aid used: No  Neck lump: Yes  Fever: No  Loss of appetite: No  Weight loss: No  Weight gain: Yes  Fatigue: Yes  Night sweats: Yes  Chills: No  Increased stress: Yes  Excessive hunger: No  Excessive thirst: Yes  Feeling hot or cold when others believe the temperature is normal: Yes  Loss of  height: No  Post-operative complications: No  Surgical site pain: No  Hallucinations: No  Change in or Loss of Energy: No  Hyperactivity: No  Confusion: No  Changes in hair: Yes  Changes in moles/birth marks: No  Itching: Yes  Rashes: No  Changes in nails: Yes  Acne: No  Hair in places you don't want it: No  Change in facial hair: Yes  Warts: No  Scarring: No  Flaking of skin: Yes  Color changes of hands/feet in cold : No  Sun sensitivity: Yes  Skin thickening: No  Eye pain: No  Vision loss: Yes  Dry eyes: Yes  Watery eyes: No  Eye bulging: No  Double vision: No  Flashing of lights: No  Spots: Yes  Floaters: Yes  Redness: No  Crossed eyes: No  Tunnel Vision: No  Yellowing of eyes: No  Eye irritation: Yes  Chest pain or pressure: No  Fast or irregular heartbeat: No  Pain in legs with walking: Yes  Trouble breathing while lying down: Yes  Fingers or toes appear blue: No  High blood pressure: Yes  Low blood pressure: Yes  Fainting: No  Murmurs: No  Pacemaker: No  Varicose veins: No  Edema or swelling: Yes  Wake up at night with shortness of breath: No  Light-headedness: Yes  Exercise intolerance: No  Heart burn or indigestion: Yes  Nausea: Yes  Vomiting: Yes  Abdominal pain: Yes  Bloating: Yes  Constipation: No  Diarrhea: No  Blood in stool: No  Black stools: No  Rectal or Anal pain: Yes  Fecal incontinence: No  Yellowing of skin or eyes: No  Vomit with blood: No  Change in stools: No  Trouble holding urine or incontinence: Yes  Pain or burning: No  Trouble starting or stopping: Yes  Increased frequency of urination: No  Blood in urine: No  Decreased frequency of urination: No  Frequent nighttime urination: No  Flank pain: Yes  Difficulty emptying bladder: No  Back pain: Yes  Muscle aches: Yes  Neck pain: Yes  Swollen joints: No  Joint pain: Yes  Bone pain: No  Muscle cramps: Yes  Muscle weakness: Yes  Joint stiffness: Yes  Bone fracture: No  Trouble with coordination: No  Dizziness or trouble with balance:  Yes  Fainting or black-out spells: No  Memory loss: No  Headache: Yes  Seizures: No  Speech problems: No  Tingling: No  Tremor: No  Weakness: Yes  Difficulty walking: Yes  Paralysis: No  Numbness: No          Again, thank you for allowing me to participate in the care of your patient.        Sincerely,        Odette Weston MD

## 2022-08-19 NOTE — PROGRESS NOTES
Chasity Hodgson is a 76 year old yo female here for follow-up of Diabetes Mellitus, and PTC/post surgical hypothyrodisim. She also has past medical istory of ANNETTE, gastroparesis, obesity,  Last seen by me May 2022    INTERVAL HISTORY:  - Daughter has COVID, she overall is feeling well with negative tests  - Started glipizide, taking 5mg in evening, feels that it has helped some.   - Gastroparesis not particularly bothersome, no nausea. Eats small meals    1) Diabetes Mellitus    Diabetes History:  Diagnosis: 2015, picked up on routine labs  Hospitalizations: None  Previous Regimens: Farxia (difficulty with frequent urination), Toujeo (was working well but insurance formulary changed 1/1/2021) At highest was at 48u daily, and then had profound low. At time of discontinuing, was down to 5u of toujeo. Previously on metformin.   Current Regimen: Tresiba 32u daily, Sitagliptin (Januiva) 50mg daily, if BG <100 has been taking half (16u)    BG check- Freestyle Vini SMBG  Trends-   Overall high-- ave 193 (lower than 224 last time)            Complications: Gastroparesis- previously on reglan,     Last eye exam: Nov 22, 2021- no retinopathy, has cataracts  Foot Exam: [facility], checks every 60 days   Blood pressure- Lisinopril 40mg daily, Atenolol 50mg daily  Lipids- ezetimibe 10 mg daily, fenofibrate 145mg daily  SIOBHAN last 7/7- 25.52    2) Hypothyroidism s/p thyroidectomy for PTC  Diagnosis: known 3 nodules that were being monitored, and in 2002 s/p thyroidectomy (nodule in isthmus was cancer), s/p BUTLER (per patient, unclear if clean margins so received BUTLER, unknown dose)  Treatment: Levothyroxine 112mcg  Residual tissue on R that has been monitored, not growing.   Last ultrasound 10/2021- no tissue seen  last biomarkers 10/7/2021- TG <0.1, TGAb <0.4      INTERVAL HISTORY-- has felt enlarging L side of her neck, ? Lymph node  Stable for 4-5 months     3) Vitamin D Deficiency  - Taking 2 tab daily  - Most recent  7/7/2021- 73  - PTH 9 (9/16/2021)    4) Hypertriglyceridemia  - On ezetimibe and fenofibrate, tried statin before with myalgias  - Eats minimal processed foods, minimal fried foods/baked treats    BP Readings from Last 3 Encounters:   08/19/22 (!) 148/80   08/11/22 (!) 155/55   07/25/22 138/86       Lab Results   Component Value Date    A1C 9.6 01/11/2022    A1C 9.5 10/07/2021    A1C 9.0 08/02/2021    A1C 8.7 07/07/2021    A1C 6.2 11/27/2020    A1C 6.8 09/10/2019       Recent Labs   Lab Test 01/11/22  1359 03/29/19  1355 08/01/17  0000 02/15/17  0000 02/11/16  0000 03/31/15  1327   CHOL 177 196   < > 142   < > 158   HDL 34* 27*   < > 31   < > 34*   LDL 81 99   < > 49   < > 51   TRIG 308* 349*   < > 309   < > 365*   CHOLHDLRATIO  --   --   --  4.6  --  4.6    < > = values in this interval not displayed.       Lab Results   Component Value Date    MICROL 36 01/11/2022    MICROL 23 07/07/2021     No results found for: MICROALBUMIN      Wt Readings from Last 3 Encounters:   08/19/22 95.3 kg (210 lb)   08/11/22 95.2 kg (209 lb 14.4 oz)   07/12/22 96.1 kg (211 lb 14.4 oz)       Current Outpatient Medications   Medication Sig Dispense Refill     amLODIPine (NORVASC) 2.5 MG tablet Take 2.5mg in AM, 2.5mg at noon, and 5mg at bedtime 360 tablet 3     aspirin (ASA) 81 MG EC tablet Take 81 mg by mouth daily       atenolol (TENORMIN) 50 MG tablet TAKE ONE TABLET BY MOUTH TWICE A  tablet 2     azelastine (ASTEPRO) 0.15 % nasal spray 1 spray       Cholecalciferol (VITAMIN D-3 PO) Take 2 tablets by mouth       clotrimazole (LOTRIMIN) 1 % cream Apply topically daily       Continuous Blood Gluc Sensor (FirmPlayYLE BRANDY 14 DAY SENSOR) MISC Apply 1 sensor and change every 14 days. 2 each 11     diphenhydrAMINE-acetaminophen (TYLENOL PM)  MG tablet Take 1 tablet by mouth At Bedtime Reported on 3/20/2017       ezetimibe (ZETIA) 10 MG tablet TAKE ONE TABLET BY MOUTH ONCE DAILY 90 tablet 3     famotidine (PEPCID) 20 MG tablet  Take 2 tablets (40 mg) by mouth as needed 180 tablet 3     fenofibrate (TRICOR) 145 MG tablet TAKE ONE TABLET BY MOUTH ONCE DAILY 90 tablet 3     ferrous sulfate (IRON) 325 (65 FE) MG tablet Take 325 mg by mouth daily (with breakfast)       fexofenadine (ALLEGRA) 180 MG tablet Take 180 mg by mouth daily. 120 0     fluocinolone acetonide (DERMA SMOOTHE/FS BODY) 0.01 % external oil Apply 2 mL to scalp once per week. Massage into scalp. Can be left in overnight or washed out after 4-6 hours. 118.28 mL 5     fluticasone (FLONASE) 50 MCG/ACT spray Spray 2 sprays in nostril daily 2 sprays in each nostril qd 1 Bottle 0     furosemide (LASIX) 20 MG tablet Take 1 tablet (20 mg) by mouth daily 90 tablet 1     glipiZIDE (GLUCOTROL XL) 5 MG 24 hr tablet Take 1 tablet (5 mg) by mouth daily 90 tablet 3     Icosapent Ethyl 1 g CAPS Take 1 g by mouth 2 times daily 60 capsule 11     lisinopril (ZESTRIL) 40 MG tablet TAKE ONE TABLET BY MOUTH ONCE DAILY 90 tablet 3     MULTIVITAMINS OR TABS ONE DAILY 100 3     nitroGLYcerin (NITROSTAT) 0.4 MG sublingual tablet Place 1 tablet (0.4 mg) under the tongue every 5 minutes as needed for chest pain (no more than 3 in one hour; after 3rd, call 911.) 25 tablet 3     nystatin (MYCOSTATIN) cream Apply topically daily as needed        nystatin-triamcinolone (MYCOLOG II) cream Apply topically daily as needed        ondansetron (ZOFRAN) 4 MG tablet Take 1 tablet by mouth every 6 hours as needed Reported on 3/20/2017       order for DME Equipment being ordered: Compression stockings - Knee High; 20-30 mmHg compression - note would like adhesive band to keep the stocking from sliding down 3 each 0     order for DME Equipment being ordered: Oral appliance for sleep apnea 1 Units 0     pantoprazole (PROTONIX) 40 MG EC tablet Take 40 mg by mouth 2 times daily       pregabalin (LYRICA) 25 MG capsule Take 1 capsule (25 mg) by mouth 2 times daily 60 capsule 1     semaglutide (OZEMPIC) 2 MG/1.5ML SOPN pen  Inject 0.25 mg subcutaneous weekly x 4 weeks, then 0.5 mg weekly 3 mL 1     SYNTHROID 112 MCG tablet Take 1 tablet (112 mcg) by mouth daily Take 112 mcg daily except for Friday takes only 56 mcg.  Brand name Synthroid 90 tablet 3     tiZANidine (ZANAFLEX) 4 MG tablet Take 4 mg by mouth every 6 hours as needed       TRESIBA FLEXTOUCH 100 UNIT/ML pen Inject 32 Units Subcutaneous every morning 5 mL 11     tretinoin (RETIN-A) 0.025 % external cream Use every night as tolerated - spot treat lesion 20 g 3       Histories reviewed and updated in Epic.  Past Medical History:   Diagnosis Date     Abdominal adhesions 1984, 96,99    s/p lysis     Abdominal adhesions      Acne rosacea      Allergic rhinitis      Allergic rhinitis, cause unspecified      Alopecia      Anemia      CAD (coronary artery disease)      Carotid stenosis      CKD (chronic kidney disease) stage 2, GFR 60-89 ml/min      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)      CPAP (continuous positive airway pressure) dependence      Depression      Diabetic gastroparesis (H)      Diet-controlled type 2 diabetes mellitus (H)      DM2 (diabetes mellitus, type 2) (H)      DVT (deep venous thrombosis) (H)      DVT of axillary vein, acute right (H)      Fibromyalgia      Gastro-oesophageal reflux disease      GERD (gastroesophageal reflux disease)      Hernia of unspecified site of abdominal cavity without mention of obstruction or gangrene     bilateral     History of blood transfusion     10/ 1980     HTN (hypertension)      HTN (hypertension)      Hypertriglyceridemia      Hypertriglyceridemia      Hypokalemia      Hypothyroidism      Obstructive sleep apnea      ANNETTE (obstructive sleep apnea)      ANNETTE on CPAP      Osteoarthritis of glenohumeral joint      Papillary carcinoma of thyroid (H)     s/p thyroidectomy - Ruegemer     Papillary carcinoma of thyroid (H)      PE (pulmonary embolism)      Poliomyelitis     poor circulation right leg     Poliomyelitis      Postsurgical  hypothyroidism     s/p papillary thryoid cancer - Ruegemer     Pulmonary embolism (H)      Pulmonary embolus (H)      Pulmonary nodule      Rosacea      S/P carpal tunnel release     bilateral     S/P hardware removal 01/2014    still with lingering foot pain     S/P shoulder surgery     bilateral     Septic joint (H)     right knee     Septic joint of right knee joint (H)      Venous insufficiency      Venous insufficiency      Venous thrombosis 1999    right axillary vein       Past Surgical History:   Procedure Laterality Date     AMPUTATE TOE(S)  3/15/2012    Procedure:AMPUTATE TOE(S); Surgeon:RUBEN MOSES; Location: OR     AMPUTATE TOE(S)       APPENDECTOMY  1972     APPENDECTOMY       ARTHRODESIS FOOT  3/15/2012    Procedure:ARTHRODESIS FOOT; RIGHT TRIPLE ARTHRODESIS, FIFTH TOE AMPUTATION, LATERAL LIGAMENT RECONSTRUCTION, TENDON TRANSFER AND RELEASE [MINI C-ARM, ARTHREX 4.5 AND 6.7 STAPLES, BIOCOMPOSITE TENODESIS SCREWS]; Surgeon:RUBEN MOSES; Location: OR     ARTHRODESIS FOOT Right     Right foot triple arthrodesis and removal of hardware     ARTHROSCOPY SHOULDER  06/25/2015    REVISION SUBACROMIAL DECOMPRESSION, EXCISION OF GANGLION CYST, DEBRIDEMENT AND EXCISION OF THE GLENOHUMERAL JOINT GANGLION CYST, CORACOID DECOMPRESSION, POSSIBLE SUBSCAPULARIS REPAIR AND OPEN SUBSCAPULARIS BICEP     ARTHROSCOPY SHOULDER ROTATOR CUFF REPAIR       BIOPSY  Thyroid 2002     BREAST SURGERY  Biopsy     CHOLECYSTECTOMY       CHOLECYSTECTOMY       COLONOSCOPY  2018     COLOSTOMY  2/7/2012    Procedure:COLOSTOMY; CREATION OF SIGMOID COLOSTOMY AND EXTENSIVE  LYSIS OF ADHESIONS; Surgeon:MONTSERRAT BENDER; Location: OR     COLOSTOMY       EYE SURGERY       GENITOURINARY SURGERY  1999     GI SURGERY      weakened rectal sphincter with artificial stimulator     HERNIA REPAIR  1976     HYSTERECTOMY TOTAL ABDOMINAL       LAPAROTOMY, LYSIS ADHESIONS, COMBINED  2/7/2012    Procedure:COMBINED LAPAROTOMY,  LYSIS ADHESIONS; Surgeon:MONTSERRAT BENDER; Location:[facility] OR     RELEASE CARPAL TUNNEL       RELEASE TENDON FOOT  3/15/2012    Procedure:RELEASE TENDON FOOT; Surgeon:SUKHDEEP METZ; Location:[facility] OR     REMOVE HARDWARE FOOT  12/13/2012    Procedure: REMOVE HARDWARE FOOT;  RIGHT FOOT REMOVAL OF HARDWARE;  Surgeon: Sukhdeep Metz MD;  Location: [facility] OR     SHOULDER SURGERY  11/12/2020    LEFT SHOULDER HEMIARHTROPLASTY, BICEP TENODESIS     SOFT TISSUE SURGERY  2018, 2020     TENDON RELEASE      foot     THYROIDECTOMY       ZZC FREEING BOWEL ADHESION,ENTEROLYSIS      1986, 1996, 1999     ZZC NONSPECIFIC PROCEDURE      throidectomy     ZZC TOTAL ABDOM HYSTERECTOMY  1980    + BSO       Allergies:  Nsaids, Toradol, Celecoxib, Codeine, Crestor [rosuvastatin], No clinical screening - see comments, Vioxx, Conjugated estrogens, and Sulfa drugs    Social History     Tobacco Use     Smoking status: Never Smoker     Smokeless tobacco: Never Used   Substance Use Topics     Alcohol use: Never       Family History   Problem Relation Age of Onset     Arthritis Mother      Hypertension Mother      Cerebrovascular Disease Mother      Obesity Mother      Heart Disease Mother         MI's     Hypertension Father      Respiratory Father         Adult RDS     Diabetes Father      Arthritis Sister      Cancer Sister      Diabetes Sister      Hypertension Sister      Breast Cancer Sister      Depression Sister      Thyroid Disease Sister      Obesity Sister      Arthritis Sister      Hypertension Sister      Thyroid Disease Sister      Osteoporosis Sister      Obesity Sister      Cancer Sister         colon polup     Heart Disease Brother         MI at 54     Other Cancer Brother         Lung & bone     Hypertension Sister      Osteoporosis Sister      Obesity Sister      Colon Cancer Sister      Lipids Brother      Hypertension Brother      Diabetes Brother      Hyperlipidemia Brother      Lipids Sister      Obesity Sister       Obesity Maternal Grandmother         Dad s mother     Skin Cancer Maternal Grandmother         skin cancer unknown     Cancer Maternal Grandmother         unknown skin cancer on face     Obesity Paternal Grandmother         Mothers mother          REVIEW OF SYSTEMS:   ROS: 10 point ROS neg other than the symptoms noted above in the HPI.      EXAM:  Vitals: BP (!) 148/80 (BP Location: Left arm, Patient Position: Sitting, Cuff Size: Adult Large)   Pulse 62   Ht 1.524 m (5')   Wt 95.3 kg (210 lb)   SpO2 100%   BMI 41.01 kg/m      BMIE= Body mass index is 41.01 kg/m .  Exam:  Constitutional: healthy, alert, no acute distress, elderly female,   Head: Normocephalic. No masses, lesions, no exophthalmos/proptosis  ENT: no palpable thyroid, L side mobile LN palpable, firm  Respiratory: nonlabored, CTAB  Gastrointestinal: Abdomen soft, non-tender.  Musculoskeletal: extremities normal- no gross deformities noted, gait normal and normal muscle tone  Skin: no suspicious lesions or rashes  Neurologic: Gait slow with walker. sensation grossly intact  Psychiatric: mentation appears normal, calm      ASSESSMENT/PLAN:  No diagnosis found.  Orders Placed This Encounter   Procedures     US Head Neck Soft Tissue       1) Diabetes Mellitus   - Glucose Control- NOT at goal,   - Contiue Tresiba 32u every morning   - Continue Glipizide 5mg XR (given renal insufficiency),    - Discussed option of trial of GLP1-- does have gastroparesis but seems well managed/minimally impactful at this point. She would like the benefit of augmented weight loss so will try GLP1. IF she experiences worsening of her symptoms will stop.  Limited other choices-- urniary frequency with SGLT-2,  and GFR 43 so hesitant to start metformin at this time point.    - STOP Januvia during trial with GLP1   - continue freestyle daryn for testing  - Cardiovascular Risk- continue ezetimibe, fenofibrate,   - Ophthalmology- uptodate, instructed to return Nov 2022, no  NPDR.  - Podiatry- patient instructed on routine foot care, follows with podiatry  - Renal- Uptodate, SIOBHAN now continue lisinopril    2) Cardiovascular Risk-   - Continue ezetimibe, fenofibrate.   - Start icosapent ethyl 1g BID  - Continue aspirin 81mg daily     3) Hypothyroidism, postsurgical  - TSH/FT4 at goal  - Continue LT4 112mcg     4) History of Papillary Thyroid Cancer  - U/S and TG/TGab show biochemically and structurally complete response  - Now 20 years out  - enlarged LN, recheck ultrasound now.    RTC- 3 months    A total of 48 minutes were spent today 08/19/22 on this visit including chart review, history and counseling, documentation and other activities as detailed above.     Answers for HPI/ROS submitted by the patient on 8/18/2022  General Symptoms: Yes  Skin Symptoms: Yes  HENT Symptoms: Yes  EYE SYMPTOMS: Yes  HEART SYMPTOMS: Yes  LUNG SYMPTOMS: No  INTESTINAL SYMPTOMS: Yes  URINARY SYMPTOMS: Yes  GYNECOLOGIC SYMPTOMS: No  BREAST SYMPTOMS: No  SKELETAL SYMPTOMS: Yes  BLOOD SYMPTOMS: No  NERVOUS SYSTEM SYMPTOMS: Yes  MENTAL HEALTH SYMPTOMS: No  Ear pain: Yes  Ear discharge: No  Hearing loss: No  Tinnitus: No  Nosebleeds: No  Congestion: No  Sinus pain: No  Trouble swallowing: Yes   Voice hoarseness: No  Mouth sores: Yes  Sore throat: Yes  Tooth pain: No  Gum tenderness: No  Bleeding gums: No  Change in taste: No  Change in sense of smell: No  Dry mouth: Yes  Hearing aid used: No  Neck lump: Yes  Fever: No  Loss of appetite: No  Weight loss: No  Weight gain: Yes  Fatigue: Yes  Night sweats: Yes  Chills: No  Increased stress: Yes  Excessive hunger: No  Excessive thirst: Yes  Feeling hot or cold when others believe the temperature is normal: Yes  Loss of height: No  Post-operative complications: No  Surgical site pain: No  Hallucinations: No  Change in or Loss of Energy: No  Hyperactivity: No  Confusion: No  Changes in hair: Yes  Changes in moles/birth marks: No  Itching: Yes  Rashes: No  Changes in  nails: Yes  Acne: No  Hair in places you don't want it: No  Change in facial hair: Yes  Warts: No  Scarring: No  Flaking of skin: Yes  Color changes of hands/feet in cold : No  Sun sensitivity: Yes  Skin thickening: No  Eye pain: No  Vision loss: Yes  Dry eyes: Yes  Watery eyes: No  Eye bulging: No  Double vision: No  Flashing of lights: No  Spots: Yes  Floaters: Yes  Redness: No  Crossed eyes: No  Tunnel Vision: No  Yellowing of eyes: No  Eye irritation: Yes  Chest pain or pressure: No  Fast or irregular heartbeat: No  Pain in legs with walking: Yes  Trouble breathing while lying down: Yes  Fingers or toes appear blue: No  High blood pressure: Yes  Low blood pressure: Yes  Fainting: No  Murmurs: No  Pacemaker: No  Varicose veins: No  Edema or swelling: Yes  Wake up at night with shortness of breath: No  Light-headedness: Yes  Exercise intolerance: No  Heart burn or indigestion: Yes  Nausea: Yes  Vomiting: Yes  Abdominal pain: Yes  Bloating: Yes  Constipation: No  Diarrhea: No  Blood in stool: No  Black stools: No  Rectal or Anal pain: Yes  Fecal incontinence: No  Yellowing of skin or eyes: No  Vomit with blood: No  Change in stools: No  Trouble holding urine or incontinence: Yes  Pain or burning: No  Trouble starting or stopping: Yes  Increased frequency of urination: No  Blood in urine: No  Decreased frequency of urination: No  Frequent nighttime urination: No  Flank pain: Yes  Difficulty emptying bladder: No  Back pain: Yes  Muscle aches: Yes  Neck pain: Yes  Swollen joints: No  Joint pain: Yes  Bone pain: No  Muscle cramps: Yes  Muscle weakness: Yes  Joint stiffness: Yes  Bone fracture: No  Trouble with coordination: No  Dizziness or trouble with balance: Yes  Fainting or black-out spells: No  Memory loss: No  Headache: Yes  Seizures: No  Speech problems: No  Tingling: No  Tremor: No  Weakness: Yes  Difficulty walking: Yes  Paralysis: No  Numbness: No

## 2022-08-19 NOTE — PATIENT INSTRUCTIONS
STOP Januvia  START Ozempic  Watch for worsening nausea, vomiting, stomach pain. If you develop any of these, stop taking ozempic and restart Januvia  Continue remaining regimen-- Tresiba 32u, Glipizide 5mg daily    Thyroid ultrasound      For surgery next week:  Take 1/2 dose insulin  OK to take glipizde IF you're eating

## 2022-08-29 ENCOUNTER — HOSPITAL ENCOUNTER (OUTPATIENT)
Dept: ULTRASOUND IMAGING | Facility: CLINIC | Age: 77
Discharge: HOME | End: 2022-08-29
Attending: INTERNAL MEDICINE | Admitting: INTERNAL MEDICINE
Payer: MEDICARE

## 2022-08-29 DIAGNOSIS — C73 PAPILLARY CARCINOMA OF THYROID (H): ICD-10-CM

## 2022-08-29 PROCEDURE — 76536 US EXAM OF HEAD AND NECK: CPT

## 2022-08-30 NOTE — PROGRESS NOTES
Sammie is a 76 year old who is being evaluated via a billable video visit.    Is Pt currently in [place]? Yes    NOTE:  If Pt is not in [place], Appointment needs to be canceled and rescheduled.  How would you like to obtain your AVS? Denis  If the video visit is dropped, the invitation should be resent by: DENIS  Will anyone else be joining your video visit? Katie Edwards CMA   [facility]   Pain Management Center    Video-Visit Details  Video Start Time: 11:42 AM  Type of service:  Video Visit  Video End Time:1206  Originating Location (pt. Location): Home  Distant Location (provider location):  [facility] PAIN MANAGEMENT [place]   Platform used for Video Visit: Well       [facility] [place]      Date of visit: 9/1/2022    Chief complaint:   Chief Complaint   Patient presents with     Pain       Interval history:  Chasity Hodgson is a 76 year old female last seen by me for INITIAL CONSULT on 6/2/2022. Last FOLLOW UP - 7/7/2022 VIRTUAL.        Since her last visit, Chasity Hodgson reports:    - Doing okay  - She [place] go [place] but is hesitant because she does not feel that stable on her legs.  She feels weak in her legs.   - The Lyrica has been helpful for her pain. She was able to increase this to 25mg BID since her last follow up with me.   - She tried Tizandine once and it was too sedating for her. She got this from [facility] Dr. Sousa.   -The patient describes severe aching pain in her lower back and left upper side of back above her waist.  She also has extreme aching pain in her left knee and a bad ache in her left thigh, hip and groin area.  She also complains of a bad aching pain in her left shoulder and upper arm.  - A lot of walking makes her pain unbearable specifically trying to go upstairs.  Laying down with her legs elevated helps to relieve the pain as does sitting in her recliner.  She also uses ice and heat which is helpful.  - She was on  gabapentin for many years and had side effects of sedation.  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   -She has been seeing a lot of different providers for her pain including a spine surgeon and orthopedic surgeon and physical medicine and rehab doctor.  They have treated her using things like physical therapy and injection therapy but have not managed her medications.   - Seeing Sharon Cook DO with [facility].  PM&R physician.   - Seeing Dr. Sousa at [facility] spine.  Last follow up was 5/6/2022  - S/P left total hip replacement 9/14/2020 with Dr. Henderson at [facility]. She sees him for left hip and knee pain.  Appt on June 6th for cortisone injection left knee.   Had a left knee MRI 4/27/22  - Dr. Ferrera at [facility] for left shoulder pain.   - LEFT L3-4 transforaminal epidural steroid injection at [facility] May 12th, 2022. None of the injections she has had have been really helpful for her pain.  She has concerns about putting medications in her spine with her history of polio.   - Doing PT at [facility] for greater trochanteric bursitis   - Diagnosed with polio when she was 3 years old.        [place]  REVIEWED TODAY: YES   LYRICA 25MG #60 8/9/2022    NORCO 5MG #15 4/5/2022  OXYCODONE 5MG #5 3/28/2022  GABAPENTIN 100MG #90 3/18/2022    MEDICATIONS FOR PAIN:   LYRICA 25MG BID  TYLENOL PRN     INJECTIONS/SURGERY:  She recently had a lumbar transforaminal epidural steroid injection done at [facility] on May 12 this was a left L3 4 injection.        IMAGING:  LUMBAR MRI 4/21/2022:        Medications:  Current Outpatient Medications   Medication Sig Dispense Refill     amLODIPine (NORVASC) 2.5 MG tablet Take 2.5mg in AM, 2.5mg at noon, and 5mg at bedtime 360 tablet 3     aspirin (ASA) 81 MG EC tablet Take 81 mg by mouth daily       atenolol (TENORMIN) 50 MG tablet TAKE ONE TABLET BY MOUTH TWICE A  tablet 2     azelastine (ASTEPRO) 0.15 % nasal  spray 1 spray       Cholecalciferol (VITAMIN D-3 PO) Take 2 tablets by mouth       clotrimazole (LOTRIMIN) 1 % cream Apply topically daily       Continuous Blood Gluc Sensor (FREESTYLE BRANDY 14 DAY SENSOR) MISC Apply 1 sensor and change every 14 days. 2 each 11     diphenhydrAMINE-acetaminophen (TYLENOL PM)  MG tablet Take 1 tablet by mouth At Bedtime Reported on 3/20/2017       ezetimibe (ZETIA) 10 MG tablet TAKE ONE TABLET BY MOUTH ONCE DAILY 90 tablet 3     famotidine (PEPCID) 20 MG tablet Take 2 tablets (40 mg) by mouth as needed 180 tablet 3     fenofibrate (TRICOR) 145 MG tablet TAKE ONE TABLET BY MOUTH ONCE DAILY 90 tablet 3     ferrous sulfate (IRON) 325 (65 FE) MG tablet Take 325 mg by mouth daily (with breakfast)       fexofenadine (ALLEGRA) 180 MG tablet Take 180 mg by mouth daily. 120 0     fluocinolone acetonide (DERMA SMOOTHE/FS BODY) 0.01 % external oil Apply 2 mL to scalp once per week. Massage into scalp. Can be left in overnight or washed out after 4-6 hours. 118.28 mL 5     fluticasone (FLONASE) 50 MCG/ACT spray Spray 2 sprays in nostril daily 2 sprays in each nostril qd 1 Bottle 0     furosemide (LASIX) 20 MG tablet Take 1 tablet (20 mg) by mouth daily 90 tablet 1     glipiZIDE (GLUCOTROL XL) 5 MG 24 hr tablet Take 1 tablet (5 mg) by mouth daily 90 tablet 3     Icosapent Ethyl 1 g CAPS Take 1 g by mouth 2 times daily 60 capsule 11     lisinopril (ZESTRIL) 40 MG tablet TAKE ONE TABLET BY MOUTH ONCE DAILY 90 tablet 3     MULTIVITAMINS OR TABS ONE DAILY 100 3     nitroGLYcerin (NITROSTAT) 0.4 MG sublingual tablet Place 1 tablet (0.4 mg) under the tongue every 5 minutes as needed for chest pain (no more than 3 in one hour; after 3rd, call 911.) 25 tablet 3     nystatin (MYCOSTATIN) cream Apply topically daily as needed        nystatin-triamcinolone (MYCOLOG II) cream Apply topically daily as needed        ondansetron (ZOFRAN) 4 MG tablet Take 1 tablet by mouth every 6 hours as needed Reported  on 3/20/2017       order for DME Equipment being ordered: Compression stockings - Knee High; 20-30 mmHg compression - note would like adhesive band to keep the stocking from sliding down 3 each 0     order for DME Equipment being ordered: Oral appliance for sleep apnea 1 Units 0     pantoprazole (PROTONIX) 40 MG EC tablet Take 40 mg by mouth 2 times daily       pregabalin (LYRICA) 25 MG capsule Take 1 capsule (25 mg) by mouth 2 times daily 60 capsule 1     semaglutide (OZEMPIC) 2 MG/1.5ML SOPN pen Inject 0.25 mg subcutaneous weekly x 4 weeks, then 0.5 mg weekly 3 mL 1     SYNTHROID 112 MCG tablet Take 1 tablet (112 mcg) by mouth daily Take 112 mcg daily except for Friday takes only 56 mcg.  Brand name Synthroid 90 tablet 3     tiZANidine (ZANAFLEX) 4 MG tablet Take 4 mg by mouth every 6 hours as needed       TRESIBA FLEXTOUCH 100 UNIT/ML pen Inject 32 Units Subcutaneous every morning 5 mL 11     tretinoin (RETIN-A) 0.025 % external cream Use every night as tolerated - spot treat lesion 20 g 3         Review of Systems:  ROS:  Constitutional, neuro, ENT, endocrine, pulmonary, cardiac, gastrointestinal, genitourinary, musculoskeletal, integument and psychiatric systems are negative, except as otherwise noted.    Physical Exam:  not currently breastfeeding.    GENERAL: Healthy, alert and no distress  EYES: Eyes grossly normal to inspection.  No discharge or erythema, or obvious scleral/conjunctival abnormalities.  RESP: No audible wheeze, cough, or visible cyanosis.  No visible retractions or increased work of breathing.    SKIN: Visible skin clear. No significant rash, abnormal pigmentation or lesions.  NEURO: Cranial nerves grossly intact.  Mentation and speech appropriate for age.  PSYCH: Mentation appears normal, affect normal/bright, judgement and insight intact, normal speech and appearance well-groomed.       ASSESSMENT/PLAN:     1. Chronic pain syndrome  The patient has chronic widespread pain.  Her most  significant pain is low back and leg pain related to her severe central stenosis and neurogenic claudication.  She is currently seeing a spine doctor and orthopedic doctor and a physical medicine and rehab doctor for this pain and surgery and injection therapy are being considered.  She is fond of these doctors and thinks they are doing a good job. Because of this I offered to simply discuss medication options for treating her pain today.     Last month we discontinued gabapentin and started low dose lyrica.  She had a day where she felt like she may be having side effects so have not been taking it twice a day and only at night.  She is feeling some pain relief.  Plan is to continue 25mg but increase to BID.  If she continues to have side effects she will send me a mychart.      I also discussed Cymbalta as another alternative for her.  Because I do not want to add 2 medications at once we can discuss this in the furture.      She should continue to use her topical medications which are somewhat helpful for her.  These include Salonpas patches and icy hot.     Continue your physical therapy at [facility].     2. Neuropathic pain  Increase Lyrica to 50mg BID      - pregabalin (LYRICA) 50 MG capsule; Take 1 capsule (50 mg) by mouth 2 times daily  Dispense: 60 capsule; Refill: 0       MEDICATIONS:   Orders Placed This Encounter   Medications     pregabalin (LYRICA) 50 MG capsule     Sig: Take 1 capsule (50 mg) by mouth 2 times daily     Dispense:  60 capsule     Refill:  2       FOLLOW UP:  12/1/2022 @ 1PM VIDEO VISIT    BILLING TIME DOCUMENTATION:   The total TIME spent on this patient on the date of the encounter/appointment was 31 minutes.      TOTAL TIME includes:   Time spent preparing to see the patient (reviewing records and tests) - 2 min  Time spent face to face (or over the phone) with the patient - 24 min  Time spent ordering tests, medications, procedures and referrals - 1 min  Time spent Referring and  communicating with other healthcare professionals - 0 min  Time spent documenting clinical information in Epic - 4 min      ANDREA ADDISON MD   Pain Management & Addiction Medicine

## 2022-09-01 ENCOUNTER — VIRTUAL VISIT (OUTPATIENT)
Dept: PALLIATIVE MEDICINE | Facility: CLINIC | Age: 77
End: 2022-09-01
Payer: MEDICARE

## 2022-09-01 DIAGNOSIS — M79.2 NEUROPATHIC PAIN: ICD-10-CM

## 2022-09-01 DIAGNOSIS — G89.4 CHRONIC PAIN SYNDROME: Primary | ICD-10-CM

## 2022-09-01 PROCEDURE — 99214 OFFICE O/P EST MOD 30 MIN: CPT | Mod: 95 | Performed by: ANESTHESIOLOGY

## 2022-09-01 RX ORDER — PREGABALIN 50 MG/1
50 CAPSULE ORAL 2 TIMES DAILY
Qty: 60 CAPSULE | Refills: 2 | Status: SHIPPED | OUTPATIENT
Start: 2022-09-01 | End: 2022-12-01

## 2022-09-01 ASSESSMENT — PAIN SCALES - GENERAL: PAINLEVEL: MODERATE PAIN (5)

## 2022-09-01 NOTE — PATIENT INSTRUCTIONS
----------------------------------------------------------------  [facility] Number:  [phone]   Call with any questions about your care and for scheduling assistance.   Calls are returned Monday through Friday between 8 AM and 4:30 PM. We usually get back to you within 2 business days depending on the issue/request.    If we are prescribing your medications:  For opioid medication refills, call the clinic or send a [employer] message 7 days in advance.  Please include:  Name of requested medication  Name of the pharmacy.  For non-opioid medications, call your pharmacy directly to request a refill. Please allow 3-4 days to be processed.   Per [place] State Law:  All controlled substance prescriptions must be filled within 30 days of being written.    For those controlled substances allowing refills, pickup must occur within 30 days of last fill.      We believe regular attendance is key to your success in our program!    Any time you are unable to keep your appointment we ask that you call us at least 24 hours in advance to cancel.This will allow us to offer the appointment time to another patient.   Multiple missed appointments may lead to dismissal from the clinic.

## 2022-09-06 ASSESSMENT — SLEEP AND FATIGUE QUESTIONNAIRES
HOW LIKELY ARE YOU TO NOD OFF OR FALL ASLEEP WHILE SITTING AND READING: MODERATE CHANCE OF DOZING
HOW LIKELY ARE YOU TO NOD OFF OR FALL ASLEEP WHILE SITTING QUIETLY AFTER LUNCH WITHOUT ALCOHOL: SLIGHT CHANCE OF DOZING
HOW LIKELY ARE YOU TO NOD OFF OR FALL ASLEEP WHILE SITTING INACTIVE IN A PUBLIC PLACE: WOULD NEVER DOZE
HOW LIKELY ARE YOU TO NOD OFF OR FALL ASLEEP WHEN YOU ARE A PASSENGER IN A CAR FOR AN HOUR WITHOUT A BREAK: SLIGHT CHANCE OF DOZING
HOW LIKELY ARE YOU TO NOD OFF OR FALL ASLEEP IN A CAR, WHILE STOPPED FOR A FEW MINUTES IN TRAFFIC: WOULD NEVER DOZE
HOW LIKELY ARE YOU TO NOD OFF OR FALL ASLEEP WHILE LYING DOWN TO REST IN THE AFTERNOON WHEN CIRCUMSTANCES PERMIT: HIGH CHANCE OF DOZING
HOW LIKELY ARE YOU TO NOD OFF OR FALL ASLEEP WHILE SITTING AND TALKING TO SOMEONE: WOULD NEVER DOZE
HOW LIKELY ARE YOU TO NOD OFF OR FALL ASLEEP WHILE WATCHING TV: MODERATE CHANCE OF DOZING

## 2022-09-08 ENCOUNTER — OFFICE VISIT (OUTPATIENT)
Dept: SLEEP MEDICINE | Facility: CLINIC | Age: 77
End: 2022-09-08
Attending: INTERNAL MEDICINE
Payer: MEDICARE

## 2022-09-08 VITALS
BODY MASS INDEX: 41.43 KG/M2 | HEIGHT: 60 IN | WEIGHT: 211 LBS | HEART RATE: 70 BPM | OXYGEN SATURATION: 98 % | SYSTOLIC BLOOD PRESSURE: 147 MMHG | DIASTOLIC BLOOD PRESSURE: 66 MMHG

## 2022-09-08 DIAGNOSIS — I10 BENIGN ESSENTIAL HYPERTENSION: ICD-10-CM

## 2022-09-08 DIAGNOSIS — G47.33 OSA (OBSTRUCTIVE SLEEP APNEA): Primary | ICD-10-CM

## 2022-09-08 PROCEDURE — 99204 OFFICE O/P NEW MOD 45 MIN: CPT | Performed by: PHYSICIAN ASSISTANT

## 2022-09-08 NOTE — NURSING NOTE
Chief Complaint   Patient presents with     Sleep Problem     ANNETTE, discuss the need for a repeat psg.        Initial BP (!) 150/63   Pulse 70   Ht 1.524 m (5')   Wt 95.7 kg (211 lb)   SpO2 98%   BMI 41.21 kg/m   Estimated body mass index is 41.21 kg/m  as calculated from the following:    Height as of this encounter: 1.524 m (5').    Weight as of this encounter: 95.7 kg (211 lb).    Medication Reconciliation: complete    Neck circumference: 15 inches / 39 centimeters.  ESS 9  ANGELITO 10  Mishel Benavidez MA

## 2022-09-08 NOTE — PATIENT INSTRUCTIONS
Your BMI is Body mass index is 41.21 kg/m .    What is BMI?  Body mass index (BMI) is one way to tell whether you are at a healthy weight, overweight, or obese. It measures your weight in relation to your height.  A BMI of 18.5 to 24.9 is in the healthy range. A person with a BMI of 25 to 29.9 is considered overweight, and someone with a BMI of 30 or greater is considered obese.  Another way to find out if you are at risk for health problems caused by overweight and obesity is to measure your waist. If you are a woman and your waist is more than 35 inches, or if you are a man and your waist is more than 40 inches, your risk of disease may be higher.  More than two-thirds of American adults are considered overweight or obese. Being overweight or obese increases the risk for further weight gain.  Excess weight may lead to heart disease and diabetes. Creating and following plans for healthy eating and physical activity may help you improve your health.    Methods for maintaining or losing weight.  Weight control is part of healthy lifestyle and includes exercise, emotional health, and healthy eating habits.  Careful eating habits lifelong is the mainstay of weight control.  Though there are significant health benefits from weight loss, long-term weight loss with diet alone may be very difficult to achieve- studies show long-term success with dietary management in less than 10% of people. Attaining a healthy weight may be especially difficult to achieve in those with severe obesity. In some cases, medications, devices and surgical management might be considered.    What can you do?  If you are overweight or obese and are interested in methods for weight loss, you should discuss this with your provider. In addition, we recommend that you review healthy life styles and methods for weight loss available through the National [employer] of Health patient information sites:    http://win.niddk.nih.gov/publications/index.htm

## 2022-09-08 NOTE — PROGRESS NOTES
Outpatient Sleep Medicine Consultation:      Name: Chasity Hodgson MRN# [id number]   Age: 76 year old YOB: 1945     Date of Consultation: September 7, 2022  Consultation is requested by: Shola Benoit MD  [address] [facility] [address]  [place] [address] Shola Benoit  Primary care provider: Shola Benoit       Reason for Sleep Consult:     Chasity Hodgson is sent by Shola Benoit for a sleep consultation regarding ANNETTE.    Patient s Reason for visit  Chasity Hodgson main reason for visit: To discuss the possibility of doing a sleep study to see if my sleep apnea has gotten worse then in the past.  Patient states problem(s) started: Years ago  Chasity Hodgson's goals for this visit: To possibly get scheduled for a sleep study           Assessment and Plan:     Summary Sleep Diagnoses and Recommendations:  (G47.33) ANNETTE (obstructive sleep apnea)  (primary encounter diagnosis), (Z68.41) BMI 40.0-44.9, adult (H), (I10) Benign essential hypertension  Comment: Sammie presents for further evaluation/management of previously diagnosed mild ANNETTE. She had a study in 2006 that showed an AHI of 5/hr, RDI of 6/hr. She tried CPAP but did not tolerate it well. She is asked frequently by other providers if she still have apnea. She wants to see if her apnea is any worse than it was. Her weight is essentially unchanged. She is not observed while sleeping as she lives alone now. She sleeps in a recliner or with the head of bed elevated and she feels she sleeps better that way. She sometimes wakes with a snort. Her ESS is normal at 9/24, but she does nap a few times per week and dozes in the recliner. That may be a function of irregular sleep scheduling. She sometimes has prolonged awakenings in the night. She has HTN, BMI 41, age 76. Negative risk factors include; neck <40 cm (39 cm), female gender.  Plan: Comprehensive Sleep Study        In lab PSG. She was told to bring Tylenol PM      Comorbid  Diagnoses:  History of polio, thyroid cancer-status post thyroidectomy, acid reflux, hypertension, allergies, DM 2, CKD st 3, chronic neuropathic pain.    Summary Counseling:    Sleep Testing Reviewed  Obstructive Sleep Apnea Reviewed  Complications of Untreated Sleep Apnea Reviewed      Patient will follow up 2 weeks after sleep study.  Bennett Goltz, PA-C    Total time spent reviewing medical records, history and physical examination, review of previous testing and interpretation as well as documentation on this date: 53 min    CC: Shola Leonides Kaycee          History of Present Illness:     She was initially evaluated for apnea in 2006 for excessive daytime sleepiness. She returns today to see if her apnea has worsened at all. She says other providers keep asking her if she has apnea. She is told that she snores. She dozes in the evening and may infrequently wake with a snort. She notices sleeping better with the head of bed elevated, or on her side.     She tried CPAP after her sleep study. She would wear it for about 4 hours max and then would remove it in the night. She would wake with it on the floor.     If she wakes after 4 hours, she can get back to sleep. If she wakes after 5-6 hours, she then has difficulty getting back to sleep. She sometimes goes to the recliner and may sleep a little there. She is then tired in the day and will nap. If she gets 7 hours of sleep, she feels pretty good.     Past Sleep Evaluations: 7/19/2006 at [facility]:  RDI 6/hr, AHI 5/hr  Saturation viola was 80%.  Obstructive events were severe in supine   position.  Hypoxemia is associated with REM sleep and obstructive events.     The following day multiple sleep latency tests were completed, only 3 naps were   completed as after the third nap the study had to be discontinued due to   technician emergency in the sleep lab.  These 3 naps resulted in a mean   sleep latency of 15.3 minutes.  There were no sleep onset REM episodes.     This falls within the range of normal.      Wt was 209# in 2005, 212# in 2008.    SLEEP-WAKE SCHEDULE:     Work/School Days: Patient goes to school/work: No   Usually gets into bed at Between 10:30 pm and 12:30 am (usually about midnight)  Takes patient about Depends on how sleepy I am but usually fall asleep within 10 to 15 minutes to fall asleep  Has trouble falling asleep Not very often, only if I wake up after 5 hours of sleeping and can t get back to sleep. If I don t go back to sleep I will be sleepy during the day.   Wakes up in the middle of the night Almost every night to go to the bathroom.   Wakes up due to Use the bathroom, Uncertain  She has trouble falling back asleep Couple times a month.   It usually takes 1 to 2 hours, sometimes longer to get back to sleep  Patient is usually up at 8:30 am - 9:00 am (usually 6:30-7 AM, sometimes 8 AM)  Uses alarm:      Weekends/Non-work Days/All Other Days:  Usually gets into bed at Around 12:30 am or 1:00 am   Takes patient about Usually 15 to 20 minutes to fall asleep  Patient is usually up at Between 8:00 am and 9:00 am  Uses alarm: Yes    Sleep Need  Patient gets  5 to 6 hours, sometimes 4 hours sleep on average   Patient thinks she needs about 7 to 8 hours sleep    Chasity Hodgson prefers to sleep in this position(s): Back, Head Elevated, Recliner   Patient states they do the following activities in bed: Use phone, computer, or tablet (infrequent iPad if not tired for 30 min, [employer])    Naps  Patient takes a purposeful nap 3 to 4 times times a week and naps are usually 1 to 3 hours in duration  She feels better after a nap: Yes  She dozes off unintentionally Several times a week watching TV especially if the program is boring. days per week  Patient has had a driving accident or near-miss due to sleepiness/drowsiness: No      SLEEP DISRUPTIONS:    Breathing/Snoring  Patient snores:Yes- she is not observed while sleeping now.  Other people complain  about her snoring: Yes  Patient has been told she stops breathing in her sleep: No  She has issues with the following: Morning mouth dryness.  Rare morning headaches, less than yearly. No nocturnal reflux    Movement:  Patient gets pain, discomfort, with an urge to move:   No restless legs. She does sometimes wake feeling hot and will kick the covers off.  It happens when she is resting:  Yes  It happens more at night:  No  Patient has been told she kicks her legs at night:  No     Behaviors in Sleep:  Chasity Hodgson has experienced the following behaviors while sleeping: Teeth grinding.  Pt denies sleep talking, sleep walking, and dream enactment behavior. Pt denies sleep paralysis, hypnagogue and cataplexy.   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 I have to use a walker to walk with. I have an area that pinches nerves in my low back that causes pain.- No respiratory involvement with the polio.      CAFFEINE AND OTHER SUBSTANCES:    Patient consumes caffeinated beverages per day:  1 to 2 per week  Last caffeine use is usually: 6 pm  List of any prescribed or over the counter stimulants that patient takes:    List of any prescribed or over the counter sleep medication patient takes: None  List of previous sleep medications that patient has tried: Tylenol PM- not for about 8-9 months  Patient drinks alcohol to help them sleep: No  Patient drinks alcohol near bedtime: No    Family History:  Patient has a family member been diagnosed with a sleep disorder: Yes  Son has sleep apnea     Social History  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 She worked some evening shifts with day shift on weekends. She retired in 2019.  Her  has Parkinsonism and is  in a nursing home now, as of 2020. She lives alone.    SCALES:    EPWORTH SLEEPINESS SCALE      [place] Sleepiness Scale ( CAMILA Rust  8922-5013<br>ESS - [place]/English - Final version - 21 Nov 07 - [facility] [place].) 9/6/2022   Sitting and reading Moderate chance of dozing   Watching TV Moderate chance of dozing   Sitting, inactive in a public place (e.g. a theatre or a meeting) Would never doze   As a passenger in a car for an hour without a break Slight chance of dozing   Lying down to rest in the afternoon when circumstances permit High chance of dozing   Sitting and talking to someone Would never doze   Sitting quietly after a lunch without alcohol Slight chance of dozing   In a car, while stopped for a few minutes in traffic Would never doze   [place] Score (MC) 9   [place] Score (Sleep) 9         INSOMNIA SEVERITY INDEX (ANGELITO)      Insomnia Severity Index (ANGELITO) 9/6/2022   Difficulty falling asleep 0   Difficulty staying asleep 2   Problems waking up too early 2   How SATISFIED/DISSATISFIED are you with your CURRENT sleep pattern? 3   How NOTICEABLE to others do you think your sleep problem is in terms of impairing the quality of your life? 0   How WORRIED/DISTRESSED are you about your current sleep problem? 1   To what extent do you consider your sleep problem to INTERFERE with your daily functioning (e.g. daytime fatigue, mood, ability to function at work/daily chores, concentration, memory, mood, etc.) CURRENTLY? 2   ANGELITO Total Score 10       Guidelines for Scoring/Interpretation:  Total score categories:  0-7 = No clinically significant insomnia   8-14 = Subthreshold insomnia   15-21 = Clinical insomnia (moderate severity)  22-28 = Clinical insomnia (severe)  Used via courtesy of www.myhealth.va.gov with permission from Александр Ley PhD., [facility]      STOP BANG     STOP BANG Questionnaire (  2008, the American Society of Anesthesiologists, Inc. [employer] 9/6/2022  "  1. Snoring - Do you snore loudly (louder than talking or loud enough to be heard through closed doors)? No   2. Tired - Do you often feel tired, fatigued, or sleepy during daytime? Yes   3. Observed - Has anyone observed you stop breathing during your sleep? No   4. Blood pressure - Do you have or are you being treated for high blood pressure? Yes   5. BMI - BMI more than 35 kg/m2? Yes   6. Age - Age over 50 yr old? Yes   7. Neck circumference - Neck circumference greater than 40 cm? No   8. Gender - Gender male? No   STOP BANG Score (MC): 5 (High risk of ANNETTE)   B/P Clinic: -   BMI Clinic: -         GAD7    JAYLEN-7  11/6/2018   1. Feeling nervous, anxious, or on edge 0   2. Not being able to stop or control worrying 0   3. Worrying too much about different things 0   4. Trouble relaxing 0   5. Being so restless that it is hard to sit still 0   6. Becoming easily annoyed or irritable 0   7. Feeling afraid, as if something awful might happen 0   JAYLEN-7 Total Score 0   If you checked any problems, how difficult have they made it for you to do your work, take care of things at home, or get along with other people? Not difficult at all         CAGE-AID    No flowsheet data found.    CAGE-AID reprinted with permission from the [place] [employer], SILVANO Al. and MAGNUS Ayala, \"Conjoint screening questionnaires for alcohol and drug abuse\" [place] Medical Journal 94: 135-140, 1995.      PATIENT HEALTH QUESTIONNAIRE-9 (PHQ - 9)    PHQ-9 ([employer]) 6/13/2022   1.  Little interest or pleasure in doing things 0   2.  Feeling down, depressed, or hopeless 0   3.  Trouble falling or staying asleep, or sleeping too much 0   4.  Feeling tired or having little energy 1   5.  Poor appetite or overeating 0   6.  Feeling bad about yourself 0   7.  Trouble concentrating 0   8.  Moving slowly or restless 0   9.  Suicidal or self-harm thoughts 0   PHQ-9 Total Score 1   Difficulty at work, home, or with people -   1.  Little interest or " pleasure in doing things Not at all   2.  Feeling down, depressed, or hopeless Not at all   3.  Trouble falling or staying asleep, or sleeping too much Not at all   4.  Feeling tired or having little energy Several days   5.  Poor appetite or overeating Not at all   6.  Feeling bad about yourself Not at all   7.  Trouble concentrating Not at all   8.  Moving slowly or restless Not at all   9.  Suicidal or self-harm thoughts Not at all   PHQ-9 via [facility] TOTAL SCORE-----> 1 (Minimal depression)   Difficulty at work, home, or with people Somewhat difficult       Developed by Faizan Early, Pauline Thao, Dann Gilbert and colleagues, with an educational guero from [employer] Inc. No permission required to reproduce, translate, display or distribute.        Allergies:    Allergies   Allergen Reactions     Nsaids Difficulty breathing     Increased creatinine     Toradol Difficulty breathing     Shortness of breath     Celecoxib Itching and Rash     Codeine Itching     With higher doses     Crestor [Rosuvastatin] Muscle Pain (Myalgia)     No Clinical Screening - See Comments Itching     Fragrance     Vioxx Other (See Comments)     Heart races     Conjugated Estrogens Rash     Sulfa Drugs Rash       Medications:    Current Outpatient Medications   Medication Sig Dispense Refill     amLODIPine (NORVASC) 2.5 MG tablet Take 2.5mg in AM, 2.5mg at noon, and 5mg at bedtime 360 tablet 3     aspirin (ASA) 81 MG EC tablet Take 81 mg by mouth daily       atenolol (TENORMIN) 50 MG tablet TAKE ONE TABLET BY MOUTH TWICE A  tablet 2     azelastine (ASTEPRO) 0.15 % nasal spray 1 spray       Cholecalciferol (VITAMIN D-3 PO) Take 2 tablets by mouth       clotrimazole (LOTRIMIN) 1 % cream Apply topically daily       Continuous Blood Gluc Sensor (FREESTYLE BRANDY 14 DAY SENSOR) [facility] Apply 1 sensor and change every 14 days. 2 each 11     diphenhydrAMINE-acetaminophen (TYLENOL PM)  MG tablet Take 1 tablet by mouth At  Bedtime Reported on 3/20/2017       ezetimibe (ZETIA) 10 MG tablet TAKE ONE TABLET BY MOUTH ONCE DAILY 90 tablet 3     famotidine (PEPCID) 20 MG tablet Take 2 tablets (40 mg) by mouth as needed 180 tablet 3     fenofibrate (TRICOR) 145 MG tablet TAKE ONE TABLET BY MOUTH ONCE DAILY 90 tablet 3     ferrous sulfate (IRON) 325 (65 FE) MG tablet Take 325 mg by mouth daily (with breakfast)       fexofenadine (ALLEGRA) 180 MG tablet Take 180 mg by mouth daily. 120 0     fluocinolone acetonide (DERMA SMOOTHE/FS BODY) 0.01 % external oil Apply 2 mL to scalp once per week. Massage into scalp. Can be left in overnight or washed out after 4-6 hours. 118.28 mL 5     fluticasone (FLONASE) 50 MCG/ACT spray Spray 2 sprays in nostril daily 2 sprays in each nostril qd 1 Bottle 0     furosemide (LASIX) 20 MG tablet Take 1 tablet (20 mg) by mouth daily 90 tablet 1     glipiZIDE (GLUCOTROL XL) 5 MG 24 hr tablet Take 1 tablet (5 mg) by mouth daily 90 tablet 3     Icosapent Ethyl 1 g CAPS Take 1 g by mouth 2 times daily 60 capsule 11     lisinopril (ZESTRIL) 40 MG tablet TAKE ONE TABLET BY MOUTH ONCE DAILY 90 tablet 3     MULTIVITAMINS OR TABS ONE DAILY 100 3     nitroGLYcerin (NITROSTAT) 0.4 MG sublingual tablet Place 1 tablet (0.4 mg) under the tongue every 5 minutes as needed for chest pain (no more than 3 in one hour; after 3rd, call 911.) 25 tablet 3     nystatin (MYCOSTATIN) cream Apply topically daily as needed        nystatin-triamcinolone (MYCOLOG II) cream Apply topically daily as needed        ondansetron (ZOFRAN) 4 MG tablet Take 1 tablet by mouth every 6 hours as needed Reported on 3/20/2017       order for DME Equipment being ordered: Compression stockings - Knee High; 20-30 mmHg compression - note would like adhesive band to keep the stocking from sliding down 3 each 0     order for DME Equipment being ordered: Oral appliance for sleep apnea 1 Units 0     pantoprazole (PROTONIX) 40 MG EC tablet Take 40 mg by mouth 2 times  daily       pregabalin (LYRICA) 25 MG capsule Take 1 capsule (25 mg) by mouth 2 times daily 60 capsule 1     pregabalin (LYRICA) 50 MG capsule Take 1 capsule (50 mg) by mouth 2 times daily 60 capsule 2     semaglutide (OZEMPIC) 2 MG/1.5ML SOPN pen Inject 0.25 mg subcutaneous weekly x 4 weeks, then 0.5 mg weekly 3 mL 1     SYNTHROID 112 MCG tablet Take 1 tablet (112 mcg) by mouth daily Take 112 mcg daily except for Friday takes only 56 mcg.  Brand name Synthroid 90 tablet 3     tiZANidine (ZANAFLEX) 4 MG tablet Take 4 mg by mouth every 6 hours as needed       TRESIBA FLEXTOUCH 100 UNIT/ML pen Inject 32 Units Subcutaneous every morning 5 mL 11     tretinoin (RETIN-A) 0.025 % external cream Use every night as tolerated - spot treat lesion 20 g 3       Problem List:  Patient Active Problem List    Diagnosis Date Noted     Mild major depression (H) 07/12/2022     Priority: Medium     Neuropathic pain 06/02/2022     Priority: Medium     Renal artery stenosis (H) 07/31/2020     Priority: Medium     CKD (chronic kidney disease) stage 3, GFR 30-59 ml/min (H) 04/17/2019     Priority: Medium     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     Priority: Medium     Morbid obesity (H) 08/01/2018     Priority: Medium     Normocytic anemia 01/16/2017     Priority: Medium     Allergic dermatitis due to other chemical product 10/02/2016     Priority: Medium     Insufficiency, arterial, peripheral (H) 11/08/2015     Priority: Medium     Mild seen on CARMITA 11/2015 - right sided       Type 2 diabetes mellitus with other specified complication (H) 10/18/2015     Priority: Medium     Carotid stenosis 12/09/2014     Priority: Medium     Mild increased stenosis 50-69% left ICA       Right shoulder pain 12/09/2014     Priority: Medium     Left knee pain 11/03/2014     Priority: Medium     Poliomyelitis      Priority: Medium     poor circulation right leg       Diabetic gastroparesis (H)      Priority: Medium     Dermatitis 02/15/2014     Priority:  Medium     Acne rosacea 02/15/2014     Priority: Medium     Esophageal reflux 01/23/2014     Priority: Medium     Had upper endoscopy - Dr. Pantoja 2013       Pulmonary nodule 09/06/2013     Priority: Medium     Alopecia 02/09/2013     Priority: Medium     Problem list name updated by automated process. Provider to review       Papillary carcinoma of thyroid (H)      Priority: Medium     s/p thyroidectomy - Ruegemer       Gastroparesis 11/12/2012     Priority: Medium     Postsurgical hypothyroidism      Priority: Medium     s/p papillary thryoid cancer - Marj  Concern for possible recurrence 03/2014       Type 2 diabetes, HbA1c goal < 7% (H)      Priority: Medium     ACP (advance care planning) 09/21/2012     Priority: Medium     Discussed advance care planning with patient; information given to patient to review. 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 Provider to review       ANNETTE (obstructive sleep apnea)      Priority: Medium     Mixed hyperlipidemia 10/31/2010     Priority: Medium     Low back pain 07/15/2009     Priority: Medium        Past Medical/Surgical History:  Past Medical History:   Diagnosis Date     Abdominal adhesions 1984, 96,99    s/p lysis     Abdominal adhesions      Acne rosacea      Allergic rhinitis      Allergic rhinitis, cause unspecified      Alopecia      Anemia      CAD (coronary artery disease)      Carotid stenosis      CKD (chronic kidney disease) stage 2, GFR 60-89 ml/min      Colostomy in place  (H)      CPAP (continuous positive airway pressure) dependence      Depression      Diabetic gastroparesis (H)      Diet-controlled type 2 diabetes mellitus (H)      DM2 (diabetes mellitus, type 2) (H)      DVT (deep venous thrombosis) (H)      DVT of axillary vein, acute right (H)      Fibromyalgia      Gastro-oesophageal reflux disease      GERD (gastroesophageal reflux disease)      Hernia of unspecified site of abdominal cavity without mention of obstruction or gangrene     bilateral     History of blood transfusion     10/ 1980     HTN (hypertension)      HTN (hypertension)      Hypertriglyceridemia      Hypertriglyceridemia      Hypokalemia      Hypothyroidism      Obstructive sleep apnea      ANNETTE (obstructive sleep apnea)      ANNETTE on CPAP      Osteoarthritis of glenohumeral joint      Papillary carcinoma of thyroid (H)     s/p thyroidectomy - Ruegemer     Papillary carcinoma of thyroid (H)      PE (pulmonary embolism)      Poliomyelitis     poor circulation right leg     Poliomyelitis      Postsurgical hypothyroidism     s/p papillary thryoid cancer - Ruegemer     Pulmonary embolism (H)      Pulmonary embolus (H)      Pulmonary nodule      Rosacea      S/P carpal tunnel release     bilateral     S/P hardware removal 01/2014    still with lingering foot pain     S/P shoulder surgery     bilateral     Septic joint (H)     right knee     Septic joint of right knee joint (H)      Venous insufficiency      Venous insufficiency      Venous thrombosis 1999    right axillary vein     Past Surgical History:   Procedure Laterality Date     AMPUTATE TOE(S)  3/15/2012    Procedure:AMPUTATE TOE(S); Surgeon:RUBEN MOSES; Location:[facility] OR     AMPUTATE TOE(S)       APPENDECTOMY  1972     APPENDECTOMY       ARTHRODESIS FOOT  3/15/2012    Procedure:ARTHRODESIS FOOT; RIGHT TRIPLE ARTHRODESIS, FIFTH TOE AMPUTATION, LATERAL LIGAMENT RECONSTRUCTION, TENDON TRANSFER AND RELEASE [MINI C-ARM, ARTHREX 4.5 AND 6.7 STAPLES,  BIOCOMPOSITE TENODESIS SCREWS]; Surgeon:SUKHDEEP METZ; Location: OR     ARTHRODESIS FOOT Right     Right foot triple arthrodesis and removal of hardware     ARTHROSCOPY SHOULDER  06/25/2015    REVISION SUBACROMIAL DECOMPRESSION, EXCISION OF GANGLION CYST, DEBRIDEMENT AND EXCISION OF THE GLENOHUMERAL JOINT GANGLION CYST, CORACOID DECOMPRESSION, POSSIBLE SUBSCAPULARIS REPAIR AND OPEN SUBSCAPULARIS BICEP     ARTHROSCOPY SHOULDER ROTATOR CUFF REPAIR       BIOPSY  Thyroid 2002     BREAST SURGERY  Biopsy     CHOLECYSTECTOMY       CHOLECYSTECTOMY       COLONOSCOPY  2018     COLOSTOMY  2/7/2012    Procedure:COLOSTOMY; CREATION OF SIGMOID COLOSTOMY AND EXTENSIVE  LYSIS OF ADHESIONS; Surgeon:MONTSERRAT BENDER; Location: OR     COLOSTOMY       EYE SURGERY       GENITOURINARY SURGERY  1999     GI SURGERY      weakened rectal sphincter with artificial stimulator     HERNIA REPAIR  1976     HYSTERECTOMY TOTAL ABDOMINAL       LAPAROTOMY, LYSIS ADHESIONS, COMBINED  2/7/2012    Procedure:COMBINED LAPAROTOMY, LYSIS ADHESIONS; Surgeon:MONTSERRAT BENDER; Location: OR     RELEASE CARPAL TUNNEL       RELEASE TENDON FOOT  3/15/2012    Procedure:RELEASE TENDON FOOT; Surgeon:SUKHDEEP METZ; Location: OR     REMOVE HARDWARE FOOT  12/13/2012    Procedure: REMOVE HARDWARE FOOT;  RIGHT FOOT REMOVAL OF HARDWARE;  Surgeon: Sukhdeep Metz MD;  Location:  OR     SHOULDER SURGERY  11/12/2020    LEFT SHOULDER HEMIARHTROPLASTY, BICEP TENODESIS     SOFT TISSUE SURGERY  2018, 2020     TENDON RELEASE      foot     THYROIDECTOMY       ZC FREEING BOWEL ADHESION,ENTEROLYSIS      1986, 1996, 1999     ZZ NONSPECIFIC PROCEDURE      throidectomy     ZZC TOTAL ABDOM HYSTERECTOMY  1980    + BSO       Social History:  Social History     Socioeconomic History     Marital status:      Spouse name: Not on file     Number of children: 2     Years of education: Not on file     Highest education level: Not on file    Occupational History     Not on file   Tobacco Use     Smoking status: Never Smoker     Smokeless tobacco: Never Used   Substance and Sexual Activity     Alcohol use: Never     Drug use: Never     Sexual activity: Not Currently     Partners: Male     Birth control/protection: Female Surgical   Other Topics Concern     Parent/sibling w/ CABG, MI or angioplasty before 65F 55M? Not Asked      Service Not Asked     Blood Transfusions Not Asked     Caffeine Concern Yes     Comment: occ     Occupational Exposure Not Asked     Hobby Hazards Not Asked     Sleep Concern Yes     Stress Concern Yes     Weight Concern Not Asked     Special Diet Yes     Comment: low carb, low sugar      Back Care Not Asked     Exercise No     Comment: occ. stationary bike      Bike Helmet Not Asked     Seat Belt Yes     Self-Exams Not Asked   Social History Narrative    , 2 children    Retired in medical records at [employer]      Social Determinants of Health     Financial Resource Strain: Not on file   Food Insecurity: Not on file   Transportation Needs: Not on file   Physical Activity: Not on file   Stress: Not on file   Social Connections: Not on file   Intimate Partner Violence: Not on file   Housing Stability: Not on file       Family History:  Family History   Problem Relation Age of Onset     Arthritis Mother      Hypertension Mother      Cerebrovascular Disease Mother      Obesity Mother      Heart Disease Mother         MI's     Hypertension Father      Respiratory Father         Adult RDS     Diabetes Father      Arthritis Sister      Cancer Sister      Diabetes Sister      Hypertension Sister      Breast Cancer Sister      Depression Sister      Thyroid Disease Sister      Obesity Sister      Arthritis Sister      Hypertension Sister      Thyroid Disease Sister      Osteoporosis Sister      Obesity Sister      Cancer Sister         colon polup     Heart Disease Brother         MI at 54     Other Cancer  Brother         Lung & bone     Hypertension Sister      Osteoporosis Sister      Obesity Sister      Colon Cancer Sister      Lipids Brother      Hypertension Brother      Diabetes Brother      Hyperlipidemia Brother      Lipids Sister      Obesity Sister      Obesity Maternal Grandmother         Dad s mother     Skin Cancer Maternal Grandmother         skin cancer unknown     Cancer Maternal Grandmother         unknown skin cancer on face     Obesity Paternal Grandmother         Mothers mother       Review of Systems:  A complete review of systems reviewed by me is negative with the exeption of what has been mentioned in the history of present illness.  In the last TWO WEEKS have you experienced any of the following symptoms?  Fevers: No  Night Sweats: Yes  Weight Gain: No  Pain at Night: Yes  Changes in Vision: Yes  Difficulty Breathing through Nose: No  Sore Throat in Morning: Yes  Dry Mouth in the Morning: Yes  Shortness of Breath Lying Flat: Yes  Shortness of Breath With Activity: No  Awakening with Shortness of Breath: No  Increased Cough: No  Heart Racing at Night: No  Swelling in Feet or Legs: Yes  Diarrhea at Night: No  Heartburn at Night: No  Urinating More than Once at Night: No  Losing Control of Urine at Night: No  Joint Pains at Night: Yes  Headaches in Morning: No  Weakness in Arms or Legs: Yes  Depressed Mood: No  Anxiety: No     Physical Examination:  Vitals: BP (!) 147/66   Pulse 70   Ht 1.524 m (5')   Wt 95.7 kg (211 lb)   SpO2 98%   BMI 41.21 kg/m    Neck Cir (cm): 39 cm      GENERAL APPEARANCE: healthy, alert, no distress and cooperative  EYES: Eyes grossly normal to inspection, PERRL, conjunctivae and sclerae normal and lids and lashes normal  HENT: soft palate dependent and oral mucous membranes moist  NECK: no adenopathy, no asymmetry, masses, or scars, thyroid normal to palpation and trachea midline and normal to palpation  RESP: lungs clear to auscultation - no rales, rhonchi or  wheezes  CV: regular rates and rhythm and grade 2/6 systolic murmur heard best over the upper right sternal border (known for years)  MS: brace on right lower extremity with compression stocking. Stasis dermatitis on left shin.  NEURO: Normal strength and tone, mentation intact and speech normal ambulates with walker  Mallampati Class: IV.  Tonsillar Stage: 1  hidden by pillars.         Data: All pertinent previous laboratory data reviewed     Recent Labs   Lab Test 08/01/22  1422 07/12/22  1127    138   POTASSIUM 4.8 4.8   CHLORIDE 105 107   CO2 28 28   ANIONGAP 7 3   * 148*   BUN 39* 29   CR 1.37* 1.05*   RINKU 9.4 9.2       Recent Labs   Lab Test 07/12/22  1127   WBC 7.7   RBC 3.89   HGB 11.7   HCT 35.9   MCV 92   MCH 30.1   MCHC 32.6   RDW 12.1          Recent Labs   Lab Test 08/02/21  1510   PROTTOTAL 7.0   ALBUMIN 3.6   BILITOTAL 0.4   ALKPHOS 51   AST 27   ALT 24       TSH   Date Value   10/07/2021 0.95 mU/L   08/02/2021 1.61 mU/L   11/27/2020 14.89 uIU/mL (H)   01/02/2018 0.50 mU/L       No results found for: UAMP, UBARB, BENZODIAZEUR, UCANN, UCOC, OPIT, UPCP    Iron Saturation Index   Date/Time Value Ref Range Status   02/16/2021 01:23 PM 19 15 - 46 % Final     Ferritin   Date/Time Value Ref Range Status   02/16/2021 01:23 PM 43 8 - 252 ng/mL Final       No results found for: PH, PHARTERIAL, PO2, [id number], SAT, PCO2, HCO3, BASEEXCESS, CINDA, BEB    @LABRCNTIPR(phv:4,pco2v:4,po2v:4,hco3v:4,dwayne:4,o2per:4)@    Echocardiology: No results found for this or any previous visit (from the past 4320 hour(s)).    Chest x-ray: No results found for this or any previous visit from the past 365 days.      Chest CT: No results found for this or any previous visit from the past 365 days.      PFT: Most Recent [facility] Pulmonary Function Testing    No results found for: 20001      Bennett Ezra Goltz, PA-C, [facility] 9/7/2022

## 2022-09-30 DIAGNOSIS — E55.9 AVITAMINOSIS D: ICD-10-CM

## 2022-09-30 DIAGNOSIS — N18.30 CHRONIC KIDNEY DISEASE, STAGE III (MODERATE) (H): ICD-10-CM

## 2022-09-30 DIAGNOSIS — E11.21 DIABETIC GLOMERULOPATHY (H): Primary | ICD-10-CM

## 2022-09-30 DIAGNOSIS — R60.0 LOCALIZED EDEMA: ICD-10-CM

## 2022-10-04 DIAGNOSIS — E11.9 TYPE 2 DIABETES, HBA1C GOAL < 7% (H): Primary | ICD-10-CM

## 2022-10-04 RX ORDER — FLURBIPROFEN SODIUM 0.3 MG/ML
SOLUTION/ DROPS OPHTHALMIC
Qty: 90 EACH | Refills: 3 | Status: SHIPPED | OUTPATIENT
Start: 2022-10-04 | End: 2023-09-27

## 2022-10-10 ENCOUNTER — HEALTH MAINTENANCE LETTER (OUTPATIENT)
Age: 77
End: 2022-10-10

## 2022-10-11 NOTE — PROGRESS NOTES
"SUBJECTIVE:   Sammie is a 77 year old who presents for Preventive Visit.      Patient has been advised of split billing requirements and indicates understanding: Yes  Are you in the first 12 months of your Medicare coverage?  No    History of Present Illness       Hypertension: She presents for follow up of hypertension.  She does check blood pressure  regularly outside of the clinic. Outside blood pressures have been over 140/90. She follows a low salt diet.    She is not taking prescribed medications regularly due to None.  Healthy Habits:     In general, how would you rate your overall health?  Good    Frequency of exercise:  None    Duration of exercise:  Other    Do you usually eat at least 4 servings of fruit and vegetables a day, include whole grains    & fiber and avoid regularly eating high fat or \"junk\" foods?  No    Taking medications regularly:  Yes    Barriers to taking medications:  None    Medication side effects:  None    Ability to successfully perform activities of daily living:  Housework requires assistance    Home Safety:  Lack of grab bars in the bathroom    Hearing Impairment:  No hearing concerns    In the past 6 months, have you been bothered by leaking of urine? Yes    In general, how would you rate your overall mental or emotional health?  Good      PHQ-2 Total Score: 0    Additional concerns today:  Yes    Do you feel safe in your environment? Yes    Have you ever done Advance Care Planning? (For example, a Health Directive, POLST, or a discussion with a medical provider or your loved ones about your wishes): Yes, advance care planning is on file.       Fall risk  Fallen 2 or more times in the past year?: No  Any fall with injury in the past year?: No    Cognitive Screening   1) Repeat 3 items (Leader, Season, Table)    2) Clock draw: NORMAL  3) 3 item recall: Recalls 3 objects  Results: 3 items recalled: COGNITIVE IMPAIRMENT LESS LIKELY    Mini-CogTM Copyright S Roberto. Licensed by the " author for use in [facility]; reprinted with permission (abdielsav@[facility]). All rights reserved.      Do you have sleep apnea, excessive snoring or daytime drowsiness?: unknown     Reviewed and updated as needed this visit by clinical staff                  Reviewed and updated as needed this visit by Provider                 Social History     Tobacco Use     Smoking status: Never     Smokeless tobacco: Never   Substance Use Topics     Alcohol use: Never     If you drink alcohol do you typically have >3 drinks per day or >7 drinks per week? No    No flowsheet data found.            Current providers sharing in care for this patient include:   Patient Care Team:  Shola Benoit MD as PCP - General (Internal Medicine)  Shola Benoit MD as Assigned PCP  Renetta Meyer MD as MD (Dermatology)  ROMI Roque MD as MD (INTERNAL MEDICINE - ENDOCRINOLOGY, DIABETES & METABOLISM)  Ulises Pantoja MD as MD (Gastroenterology)  Kishore Ferrera MD as MD (Orthopedics)  Tara Cornejo, RN as Diabetes Educator (Diabetes Education)  Catrachita Prather, PharmD as Pharmacist (Pharmacist)  Ankit Bhatia MD as Assigned Heart and Vascular Provider  Renetta Meyer MD as Assigned Surgical Provider  Odette Weston MD as MD (Endocrinology, Diabetes, and Metabolism)  Odette Weston MD as Assigned Endocrinology Provider  Goltz, Bennett Ezra, PA-C as Assigned Neuroscience Provider    The following health maintenance items are reviewed in Epic and correct as of today:  Health Maintenance   Topic Date Due     URINE DRUG SCREEN  Never done     ZOSTER IMMUNIZATION (2 of 3) 10/24/2016     DIABETIC FOOT EXAM  05/08/2019     MEDICARE ANNUAL WELLNESS VISIT  05/03/2022     COVID-19 Vaccine (5 - Booster for Pfizer series) 07/12/2022     INFLUENZA VACCINE (1) 09/01/2022     A1C  11/17/2022     EYE EXAM  11/22/2022     PHQ-9  12/14/2022     BMP  02/01/2023     LIPID  05/17/2023      MICROALBUMIN  05/17/2023     ANNUAL REVIEW OF HM ORDERS  05/17/2023     COLORECTAL CANCER SCREENING  05/29/2023     ADVANCE CARE PLANNING  06/08/2023     FALL RISK ASSESSMENT  07/12/2023     HEMOGLOBIN  07/12/2023     DEXA  04/17/2029     DTAP/TDAP/TD IMMUNIZATION (3 - Td or Tdap) 06/14/2032     HEPATITIS C SCREENING  Completed     DEPRESSION ACTION PLAN  Completed     Pneumococcal Vaccine: 65+ Years  Completed     URINALYSIS  Completed     IPV IMMUNIZATION  Aged Out     MENINGITIS IMMUNIZATION  Aged Out     MAMMO SCREENING  Discontinued     Lab work is in process  Labs reviewed in EPIC    Essential hypertension   Blood pressure has been monitored at home and readings have been varying from 100/56 up to 178/80 systolic.  Blood pressure is usually 142/58.  She is taking furosemide on rare occasions.  But will have days in which she does not take any lasix.  She is taking lisinopril 40 mg daily and taking atenolol and amlodipine twice per day.    Mixed hyperlipidemia   Cholesterol values to be rechecked and continues on ezetimibe and fenofibrate.  Statin is not prescribed due to side effects of myalgia  Gastroesophageal reflux disease without esophagitis   Diet has been stable.  Experiencing some symtptoms of esophageal fullness.  Taking both famotidine and pantoprazole.  Had dilation of esophagus in 2019 and recommended to follow up in gastroenterology for repeat dilation as needed   Type 2 diabetes, HbA1c goal < 7% (H)   Has had fluctuating blood glucose readings with some lower readings in the 50's with light-headedness.  She is taking Tresiba 32 units daily and ozempic as well as glipizide.  She is following with endocrinology.    Papillary carcinoma of thyroid (H)   Due for recheck TSH.  Tolerating levothyroxine  Neuropathic pain   Has been following in pain clinic and continues of Lyrica and acetaminophen       Mammogram Screening - Patient over age 75, has elected to continue with screening.  Pertinent  mammograms are reviewed under the imaging tab.    Review of Systems  Constitutional, HEENT, cardiovascular, pulmonary, GI, , musculoskeletal, neuro, skin, endocrine and psych systems are negative, except as otherwise noted.    OBJECTIVE:   /72   Pulse 65   Temp (!) 96.7  F (35.9  C)   Resp 18   Ht 1.524 m (5')   Wt 96.2 kg (212 lb)   SpO2 99%   BMI 41.40 kg/m   Estimated body mass index is 41.4 kg/m  as calculated from the following:    Height as of this encounter: 1.524 m (5').    Weight as of this encounter: 96.2 kg (212 lb).  Physical Exam  GENERAL: healthy, alert and no distress  EYES: Eyes grossly normal to inspection,    NECK: no adenopathy, no asymmetry, masses, or scars and thyroid normal to palpation  RESP: lungs clear to auscultation - no rales, rhonchi or wheezes  CV: regular rate and rhythm, normal S1 S2, no S3 or S4, no murmur, 2+ edema bilateral lower extremity   ABDOMEN: soft, nontender, no hepatosplenomegaly   MS: bilateral ankle brace, gait not assessed  SKIN: no suspicious lesions or rashes  NEURO: Normal strength and tone, mentation intact and speech normal  PSYCH: mentation appears normal, affect normal/bright    Diagnostic Test Results:  Labs reviewed in Epic    ASSESSMENT / PLAN:     Patient Instructions   For routine exam, we will draw labs as ordered, cholesterol, diabetes mellitus check, liver function, renal function.  We will also update vaccination history.   [facility] (also performs diagnostic mammogram, ultrasound and biopsy) [phone].     (Z79.899) Encounter for long-term (current) use of medications  (primary encounter diagnosis)  Comment: Continue follow up in pain clinic  Plan:     (I10) Benign essential hypertension  Comment: blood pressure is stable, but I would recommend amlodipine resuming 2.5 mg in the AM, 2.5 mg at lunch and 5 mg at night as you were taking prior.  OK to use lasix more often  Plan: amLODIPine (NORVASC) 2.5 MG tablet,  furosemide         (LASIX) 20 MG tablet            (I10) Essential hypertension  Comment: as above   Plan: lisinopril (ZESTRIL) 40 MG tablet            (E78.2) Mixed hyperlipidemia  Comment: Continue zetia -   Plan: ezetimibe (ZETIA) 10 MG tablet            (K21.9) Gastroesophageal reflux disease without esophagitis  Comment: Continue both famotidine and pantoprazole  Plan: famotidine (PEPCID) 20 MG tablet            (Z13.6) CARDIOVASCULAR SCREENING; LDL GOAL LESS THAN 130  Comment: check fasting lipid panel   Plan: fenofibrate (TRICOR) 145 MG tablet            (E11.9) Type 2 diabetes, HbA1c goal < 7% (H)  Comment: Follow-up in endocrinology - recommend holding glipizide with follow up in endocrinology.  Eye exam up to date   Plan:     (C73) Papillary carcinoma of thyroid (H)  Comment: Due for follow up in endocrinology   Plan:     (M79.2) Neuropathic pain  Comment: continue Lyrica with follow up in pain clinic  Plan:     Chronic kidney disease  Comment; Recommend follow up in nephrology and we will check labs today          Patient has been advised of split billing requirements and indicates understanding: Yes    COUNSELING:  Reviewed preventive health counseling, as reflected in patient instructions    Estimated body mass index is 41.4 kg/m  as calculated from the following:    Height as of this encounter: 1.524 m (5').    Weight as of this encounter: 96.2 kg (212 lb).        She reports that she has never smoked. She has never used smokeless tobacco.      Appropriate preventive services were discussed with this patient, including applicable screening as appropriate for cardiovascular disease, diabetes, osteopenia/osteoporosis, and glaucoma.  As appropriate for age/gender, discussed screening for colorectal cancer, prostate cancer, breast cancer, and cervical cancer. Checklist reviewing preventive services available has been given to the patient.    Reviewed patients plan of care and provided an AVS. The Basic  Care Plan (routine screening as documented in Health Maintenance) for Chasity meets the Care Plan requirement. This Care Plan has been established and reviewed with the Patient.    Counseling Resources:  ATP IV Guidelines  Pooled Cohorts Equation Calculator  Breast Cancer Risk Calculator  Breast Cancer: Medication to Reduce Risk  FRAX Risk Assessment  ICSI Preventive Guidelines  Dietary Guidelines for Americans, 2010  [employer]'s MyPlate  ASA Prophylaxis  Lung CA Screening    Shola Benoit MD, MD  [facility]    Identified Health Risks:

## 2022-10-12 ENCOUNTER — OFFICE VISIT (OUTPATIENT)
Dept: FAMILY MEDICINE | Facility: CLINIC | Age: 77
End: 2022-10-12
Payer: MEDICARE

## 2022-10-12 VITALS
HEART RATE: 65 BPM | OXYGEN SATURATION: 99 % | SYSTOLIC BLOOD PRESSURE: 130 MMHG | WEIGHT: 212 LBS | DIASTOLIC BLOOD PRESSURE: 72 MMHG | RESPIRATION RATE: 18 BRPM | TEMPERATURE: 96.7 F | HEIGHT: 60 IN | BODY MASS INDEX: 41.62 KG/M2

## 2022-10-12 DIAGNOSIS — K21.9 GASTROESOPHAGEAL REFLUX DISEASE WITHOUT ESOPHAGITIS: ICD-10-CM

## 2022-10-12 DIAGNOSIS — Z13.6 CARDIOVASCULAR SCREENING; LDL GOAL LESS THAN 130: ICD-10-CM

## 2022-10-12 DIAGNOSIS — C73 PAPILLARY CARCINOMA OF THYROID (H): ICD-10-CM

## 2022-10-12 DIAGNOSIS — R60.0 LOCALIZED EDEMA: ICD-10-CM

## 2022-10-12 DIAGNOSIS — I10 ESSENTIAL HYPERTENSION: ICD-10-CM

## 2022-10-12 DIAGNOSIS — Z23 NEED FOR PROPHYLACTIC VACCINATION AND INOCULATION AGAINST INFLUENZA: ICD-10-CM

## 2022-10-12 DIAGNOSIS — M79.2 NEUROPATHIC PAIN: ICD-10-CM

## 2022-10-12 DIAGNOSIS — Z79.899 ENCOUNTER FOR LONG-TERM (CURRENT) USE OF MEDICATIONS: ICD-10-CM

## 2022-10-12 DIAGNOSIS — E55.9 AVITAMINOSIS D: ICD-10-CM

## 2022-10-12 DIAGNOSIS — N18.30 STAGE 3 CHRONIC KIDNEY DISEASE, UNSPECIFIED WHETHER STAGE 3A OR 3B CKD (H): ICD-10-CM

## 2022-10-12 DIAGNOSIS — E11.9 TYPE 2 DIABETES, HBA1C GOAL < 7% (H): ICD-10-CM

## 2022-10-12 DIAGNOSIS — I10 BENIGN ESSENTIAL HYPERTENSION: ICD-10-CM

## 2022-10-12 DIAGNOSIS — Z23 HIGH PRIORITY FOR 2019-NCOV VACCINE: ICD-10-CM

## 2022-10-12 DIAGNOSIS — Z00.00 ROUTINE GENERAL MEDICAL EXAMINATION AT A HEALTH CARE FACILITY: Primary | ICD-10-CM

## 2022-10-12 DIAGNOSIS — Z12.31 ENCOUNTER FOR SCREENING MAMMOGRAM FOR MALIGNANT NEOPLASM OF BREAST: ICD-10-CM

## 2022-10-12 DIAGNOSIS — N18.31 STAGE 3A CHRONIC KIDNEY DISEASE (H): ICD-10-CM

## 2022-10-12 DIAGNOSIS — E78.2 MIXED HYPERLIPIDEMIA: ICD-10-CM

## 2022-10-12 DIAGNOSIS — E11.21 DIABETIC GLOMERULOPATHY (H): ICD-10-CM

## 2022-10-12 LAB
ALBUMIN UR-MCNC: NEGATIVE MG/DL
APPEARANCE UR: CLEAR
BACTERIA #/AREA URNS HPF: ABNORMAL /HPF
BASOPHILS # BLD AUTO: 0.1 10E3/UL (ref 0–0.2)
BASOPHILS NFR BLD AUTO: 0 %
BILIRUB UR QL STRIP: NEGATIVE
COLOR UR AUTO: YELLOW
EOSINOPHIL # BLD AUTO: 0.1 10E3/UL (ref 0–0.7)
EOSINOPHIL NFR BLD AUTO: 1 %
ERYTHROCYTE [DISTWIDTH] IN BLOOD BY AUTOMATED COUNT: 12.9 % (ref 10–15)
GLUCOSE UR STRIP-MCNC: NEGATIVE MG/DL
HBA1C MFR BLD: 8.1 % (ref 0–5.6)
HCT VFR BLD AUTO: 37.3 % (ref 35–47)
HGB BLD-MCNC: 12.1 G/DL (ref 11.7–15.7)
HGB UR QL STRIP: NEGATIVE
IMM GRANULOCYTES # BLD: 0.1 10E3/UL
IMM GRANULOCYTES NFR BLD: 1 %
KETONES UR STRIP-MCNC: NEGATIVE MG/DL
LEUKOCYTE ESTERASE UR QL STRIP: ABNORMAL
LYMPHOCYTES # BLD AUTO: 2.2 10E3/UL (ref 0.8–5.3)
LYMPHOCYTES NFR BLD AUTO: 18 %
MCH RBC QN AUTO: 29.7 PG (ref 26.5–33)
MCHC RBC AUTO-ENTMCNC: 32.4 G/DL (ref 31.5–36.5)
MCV RBC AUTO: 92 FL (ref 78–100)
MONOCYTES # BLD AUTO: 1 10E3/UL (ref 0–1.3)
MONOCYTES NFR BLD AUTO: 8 %
NEUTROPHILS # BLD AUTO: 8.4 10E3/UL (ref 1.6–8.3)
NEUTROPHILS NFR BLD AUTO: 72 %
NITRATE UR QL: NEGATIVE
PH UR STRIP: 5.5 [PH] (ref 5–7)
PLATELET # BLD AUTO: 265 10E3/UL (ref 150–450)
PTH-INTACT SERPL-MCNC: 21 PG/ML (ref 15–65)
RBC # BLD AUTO: 4.07 10E6/UL (ref 3.8–5.2)
RBC #/AREA URNS AUTO: ABNORMAL /HPF
SP GR UR STRIP: 1.02 (ref 1–1.03)
SQUAMOUS #/AREA URNS AUTO: ABNORMAL /LPF
UROBILINOGEN UR STRIP-ACNC: 0.2 E.U./DL
WBC # BLD AUTO: 11.8 10E3/UL (ref 4–11)
WBC #/AREA URNS AUTO: ABNORMAL /HPF

## 2022-10-12 PROCEDURE — 80061 LIPID PANEL: CPT | Performed by: INTERNAL MEDICINE

## 2022-10-12 PROCEDURE — 90662 IIV NO PRSV INCREASED AG IM: CPT | Performed by: INTERNAL MEDICINE

## 2022-10-12 PROCEDURE — 83970 ASSAY OF PARATHORMONE: CPT | Performed by: INTERNAL MEDICINE

## 2022-10-12 PROCEDURE — 0124A COVID-19,PF,PFIZER BOOSTER BIVALENT: CPT | Performed by: INTERNAL MEDICINE

## 2022-10-12 PROCEDURE — 82306 VITAMIN D 25 HYDROXY: CPT | Performed by: INTERNAL MEDICINE

## 2022-10-12 PROCEDURE — 36415 COLL VENOUS BLD VENIPUNCTURE: CPT | Performed by: INTERNAL MEDICINE

## 2022-10-12 PROCEDURE — 80050 GENERAL HEALTH PANEL: CPT | Performed by: INTERNAL MEDICINE

## 2022-10-12 PROCEDURE — 82043 UR ALBUMIN QUANTITATIVE: CPT | Performed by: INTERNAL MEDICINE

## 2022-10-12 PROCEDURE — G0008 ADMIN INFLUENZA VIRUS VAC: HCPCS | Mod: 59 | Performed by: INTERNAL MEDICINE

## 2022-10-12 PROCEDURE — 81001 URINALYSIS AUTO W/SCOPE: CPT | Performed by: INTERNAL MEDICINE

## 2022-10-12 PROCEDURE — 91312 COVID-19,PF,PFIZER BOOSTER BIVALENT: CPT | Performed by: INTERNAL MEDICINE

## 2022-10-12 PROCEDURE — 99214 OFFICE O/P EST MOD 30 MIN: CPT | Mod: 25 | Performed by: INTERNAL MEDICINE

## 2022-10-12 PROCEDURE — 83036 HEMOGLOBIN GLYCOSYLATED A1C: CPT | Performed by: INTERNAL MEDICINE

## 2022-10-12 PROCEDURE — G0439 PPPS, SUBSEQ VISIT: HCPCS | Performed by: INTERNAL MEDICINE

## 2022-10-12 PROCEDURE — 84100 ASSAY OF PHOSPHORUS: CPT | Performed by: INTERNAL MEDICINE

## 2022-10-12 RX ORDER — FUROSEMIDE 20 MG
20 TABLET ORAL DAILY PRN
Qty: 90 TABLET | Refills: 3 | Status: ON HOLD | OUTPATIENT
Start: 2022-10-12 | End: 2024-08-21

## 2022-10-12 RX ORDER — FAMOTIDINE 20 MG/1
40 TABLET, FILM COATED ORAL PRN
Qty: 180 TABLET | Refills: 3 | Status: ON HOLD | OUTPATIENT
Start: 2022-10-12 | End: 2023-09-01

## 2022-10-12 RX ORDER — FENOFIBRATE 145 MG/1
145 TABLET, COATED ORAL DAILY
Qty: 90 TABLET | Refills: 3 | Status: SHIPPED | OUTPATIENT
Start: 2022-10-12 | End: 2023-09-27

## 2022-10-12 RX ORDER — AMLODIPINE BESYLATE 2.5 MG/1
TABLET ORAL
Qty: 360 TABLET | Refills: 3 | Status: SHIPPED | OUTPATIENT
Start: 2022-10-12 | End: 2023-04-07 | Stop reason: ALTCHOICE

## 2022-10-12 RX ORDER — EZETIMIBE 10 MG/1
10 TABLET ORAL DAILY
Qty: 90 TABLET | Refills: 3 | Status: SHIPPED | OUTPATIENT
Start: 2022-10-12 | End: 2023-09-27

## 2022-10-12 RX ORDER — LISINOPRIL 40 MG/1
40 TABLET ORAL DAILY
Qty: 90 TABLET | Refills: 3 | Status: SHIPPED | OUTPATIENT
Start: 2022-10-12 | End: 2023-06-29 | Stop reason: ALTCHOICE

## 2022-10-12 ASSESSMENT — ACTIVITIES OF DAILY LIVING (ADL): CURRENT_FUNCTION: HOUSEWORK REQUIRES ASSISTANCE

## 2022-10-12 ASSESSMENT — PAIN SCALES - GENERAL: PAINLEVEL: EXTREME PAIN (9)

## 2022-10-12 NOTE — PATIENT INSTRUCTIONS
For routine exam, we will draw labs as ordered, cholesterol, diabetes mellitus check, liver function, renal function.  We will also update vaccination history.   [facility] (also performs diagnostic mammogram, ultrasound and biopsy) [phone].     (M13.749) Encounter for long-term (current) use of medications  (primary encounter diagnosis)  Comment: Continue follow up in pain clinic  Plan:     (I10) Benign essential hypertension  Comment: blood pressure is stable, but I would recommend amlodipine resuming 2.5 mg in the AM, 2.5 mg at lunch and 5 mg at night as you were taking prior.  OK to use lasix more often  Plan: amLODIPine (NORVASC) 2.5 MG tablet, furosemide         (LASIX) 20 MG tablet            (I10) Essential hypertension  Comment: as above   Plan: lisinopril (ZESTRIL) 40 MG tablet            (E78.2) Mixed hyperlipidemia  Comment: Continue zetia -   Plan: ezetimibe (ZETIA) 10 MG tablet            (K21.9) Gastroesophageal reflux disease without esophagitis  Comment: Continue both famotidine and pantoprazole  Plan: famotidine (PEPCID) 20 MG tablet            (Z13.6) CARDIOVASCULAR SCREENING; LDL GOAL LESS THAN 130  Comment: check fasting lipid panel   Plan: fenofibrate (TRICOR) 145 MG tablet            (E11.9) Type 2 diabetes, HbA1c goal < 7% (H)  Comment: Follow-up in endocrinology - recommend holding glipizide with follow up in endocrinology.  Eye exam up to date   Plan:     (C73) Papillary carcinoma of thyroid (H)  Comment: Due for follow up in endocrinology   Plan:     (M79.2) Neuropathic pain  Comment: continue Lyrica with follow up in pain clinic  Plan:     Chronic kidney disease  Comment; Recommend follow up in nephrology and we will check labs today

## 2022-10-13 LAB
ALBUMIN SERPL-MCNC: 3.4 G/DL (ref 3.4–5)
ALP SERPL-CCNC: 61 U/L (ref 40–150)
ALT SERPL W P-5'-P-CCNC: 24 U/L (ref 0–50)
ANION GAP SERPL CALCULATED.3IONS-SCNC: 7 MMOL/L (ref 3–14)
AST SERPL W P-5'-P-CCNC: 18 U/L (ref 0–45)
BILIRUB SERPL-MCNC: 0.4 MG/DL (ref 0.2–1.3)
BUN SERPL-MCNC: 41 MG/DL (ref 7–30)
CALCIUM SERPL-MCNC: 9 MG/DL (ref 8.5–10.1)
CHLORIDE BLD-SCNC: 109 MMOL/L (ref 94–109)
CHOLEST SERPL-MCNC: 130 MG/DL
CO2 SERPL-SCNC: 23 MMOL/L (ref 20–32)
CREAT SERPL-MCNC: 1.18 MG/DL (ref 0.52–1.04)
CREAT UR-MCNC: 107 MG/DL
DEPRECATED CALCIDIOL+CALCIFEROL SERPL-MC: 64 UG/L (ref 20–75)
FASTING STATUS PATIENT QL REPORTED: NO
GFR SERPL CREATININE-BSD FRML MDRD: 48 ML/MIN/1.73M2
GLUCOSE BLD-MCNC: 201 MG/DL (ref 70–99)
HDLC SERPL-MCNC: 40 MG/DL
LDLC SERPL CALC-MCNC: 64 MG/DL
MICROALBUMIN UR-MCNC: 9 MG/L
MICROALBUMIN/CREAT UR: 8.41 MG/G CR (ref 0–25)
NONHDLC SERPL-MCNC: 90 MG/DL
PHOSPHATE SERPL-MCNC: 4.3 MG/DL (ref 2.5–4.5)
POTASSIUM BLD-SCNC: 5 MMOL/L (ref 3.4–5.3)
PROT SERPL-MCNC: 6.8 G/DL (ref 6.8–8.8)
SODIUM SERPL-SCNC: 139 MMOL/L (ref 133–144)
TRIGL SERPL-MCNC: 131 MG/DL
TSH SERPL DL<=0.005 MIU/L-ACNC: 2.77 MU/L (ref 0.4–4)

## 2022-10-14 LAB
DEPRECATED CALCIDIOL+CALCIFEROL SERPL-MC: <87 UG/L (ref 20–75)
VITAMIN D2 SERPL-MCNC: <5 UG/L
VITAMIN D3 SERPL-MCNC: 82 UG/L

## 2022-10-19 ENCOUNTER — TRANSFERRED RECORDS (OUTPATIENT)
Dept: HEALTH INFORMATION MANAGEMENT | Facility: CLINIC | Age: 77
End: 2022-10-19

## 2022-10-27 ENCOUNTER — HOSPITAL ENCOUNTER (OUTPATIENT)
Dept: MAMMOGRAPHY | Facility: CLINIC | Age: 77
Discharge: HOME | End: 2022-10-27
Attending: INTERNAL MEDICINE | Admitting: INTERNAL MEDICINE
Payer: MEDICARE

## 2022-10-27 DIAGNOSIS — Z00.00 ROUTINE GENERAL MEDICAL EXAMINATION AT A HEALTH CARE FACILITY: ICD-10-CM

## 2022-10-27 DIAGNOSIS — Z12.31 ENCOUNTER FOR SCREENING MAMMOGRAM FOR MALIGNANT NEOPLASM OF BREAST: ICD-10-CM

## 2022-10-27 PROCEDURE — 77067 SCR MAMMO BI INCL CAD: CPT

## 2022-10-31 ENCOUNTER — TELEPHONE (OUTPATIENT)
Dept: ENDOCRINOLOGY | Facility: CLINIC | Age: 77
End: 2022-10-31

## 2022-10-31 DIAGNOSIS — E11.9 TYPE 2 DIABETES, HBA1C GOAL < 7% (H): ICD-10-CM

## 2022-10-31 RX ORDER — FLASH GLUCOSE SENSOR
KIT MISCELLANEOUS
Qty: 2 EACH | Refills: 11 | Status: CANCELLED | OUTPATIENT
Start: 2022-10-31

## 2022-10-31 RX ORDER — FLASH GLUCOSE SENSOR
KIT MISCELLANEOUS
Qty: 2 EACH | Refills: 11 | Status: SHIPPED | OUTPATIENT
Start: 2022-10-31 | End: 2022-11-30

## 2022-10-31 NOTE — TELEPHONE ENCOUNTER
Last Written Prescription Date:  9/29/21  Last Fill Quantity: 2,  # refills: 11   Last office visit: 8/19/2022 with prescribing provider: Dr. Weston  Future Office Visit: 11/30/22        Requested Prescriptions   Pending Prescriptions Disp Refills     Continuous Blood Gluc Sensor (FREESTYLE BRANDY 14 DAY SENSOR) MISC 2 each 11     Sig: Apply 1 sensor and change every 14 days.       There is no refill protocol information for this order        Refill sent per protocol  Gina Alvarez RN

## 2022-10-31 NOTE — TELEPHONE ENCOUNTER
[facility] Call Center    Phone Message    May a detailed message be left on voicemail: yes     Reason for Call: Medication Refill Request    Has the patient contacted the pharmacy for the refill? Yes   Name of medication being requested: Continuous Blood Gluc Sensor (FREESTYLE BRANDY 14 DAY SENSOR) [facility]  Provider who prescribed the medication: Odette Weston MD  Pharmacy: [address]  Date medication is needed: ASAP. Patient is completely out and did not realize her prescription  last month.         Action Taken: Other: Endo    Travel Screening: Not Applicable

## 2022-11-01 ENCOUNTER — HOSPITAL ENCOUNTER (OUTPATIENT)
Dept: GENERAL RADIOLOGY | Facility: CLINIC | Age: 77
Discharge: HOME | End: 2022-11-01
Attending: INTERNAL MEDICINE | Admitting: INTERNAL MEDICINE
Payer: MEDICARE

## 2022-11-01 DIAGNOSIS — R13.10 DYSPHAGIA, UNSPECIFIED TYPE: ICD-10-CM

## 2022-11-01 DIAGNOSIS — K31.7 POLYP OF STOMACH AND DUODENUM: ICD-10-CM

## 2022-11-01 PROCEDURE — 255N000001 HC RX 255: Performed by: INTERNAL MEDICINE

## 2022-11-01 PROCEDURE — 74220 X-RAY XM ESOPHAGUS 1CNTRST: CPT

## 2022-11-01 RX ADMIN — ANTACID/ANTIFLATULENT 4 G: 380; 550; 10; 10 GRANULE, EFFERVESCENT ORAL at 14:33

## 2022-11-01 NOTE — RESULT ENCOUNTER NOTE
Gerard Gaspar,    I have had the opportunity to review your recent results and an interpretation is as follows:  Your x-ray esophagram does show evidence of esophageal dysmotility with delayed esophageal emptying, and follow-up with gastroenterology for management of this would be recommended - [facility] [phone]     Sincerely,  Shola Benoit MD

## 2022-11-04 NOTE — PROGRESS NOTES
[facility] Dermatology Note  Encounter Date: Nov 7, 2022  Office Visit     Dermatology Problem List:  1. Female pattern hair loss  - Current tx: laser comb up to 3x weekly (switching to band 8/2021), free and clear shampoo, fluocinolone acetonide 0.01% oil once per week  - Previously on 1/2 tab of minoxidil 2.5 mg daily, discontinued secondary to elevated creatinine 10/2020, has since stabilized  -Baseline Hair Metrix 8/16/2021   2. Tinea corporis or candida/yeast infection under the breast  - OTC clomitrazole  3. Rosacea - stable  - Vanicream  4.  Venous stasis dermatitis - stable  - Vanicream  5. Allergic contact dermatitis - stable  - Uses fragrance-free products  - Patch testing revealed mild reaction to Balsam of [place], fragrance mix, and a strong reaction to paraphenylenediamine.   6. Xerosis cutis  -Recommend continuing use of a gentle, fragrance-free emollient based moisturizer such as Vanicream or CeraVe.   7. Abrasion , right upper arm 02/15/2021.   - Monitor, photo taken today.   8. Milia, right lateral eye  - Removed 8/16/2021   - prior tx: tretinoin  ____________________________________________    Assessment & Plan:     # Female pattern hair loss- improving  # Hx of allergic contact dermatitis of scalp  Hair loss is overall stable but has undergone some stressful health events recently. Will get back to regimen including increasing use of comb and fluocinolone oil  - Fluocinolone 0.01% oil once per week for scalp pruritus and dryness.  - Continue to use laser headband or comb at least 3 times per week.  - Continue to use of Vanicream shampoo.    # Tinea corporis or candida/yeast infection under the breast.  - Instructed to keep the area clean and dry.   - OTC topical clomitrazole     Procedures Performed:   None    Follow-up: 6 month(s) in-person, or earlier for new or changing lesions    Staff and Scribe:     Scribe Disclosure:  I, Kavon Stein, am serving as a scribe to  document services personally performed by Renetta Meyer MD based on data collection and the provider's statements to me.     Kristina Bryant  Medical Student    Provider Disclosure:   The documentation recorded by the scribe accurately reflects the services I personally performed and the decisions made by me.    Staff Physician:  I was present with the medical student who participated in the service and in the documentation of the note. I have verified the history and personally performed the physical exam and medical decision making. I agree with the assessment and plan of care as documented in the note.     Renetta Meyer MD  Professor and Chair  Department of Dermatology  [facility]: Phone: [phone], Fax:[phone]  [facility]: Phone: [phone], Fax: [phone]        ____________________________________________    CC: Hair loss    HPI:  Ms. Chasity Hodgson is a 77 year old female who presents as a return patient for follow-up of hair loss, diagnosed as female pattern hair loss.   - Last seen virtually on 4/18/2022  - Shedding or thinning, or both: Thinning  - Current tx: laser comb up to 3x weekly (switching to band 8/2021), Vanicream shampoo, fluocinolone acetonide 0.01% oil once per week  Yes, fish oil Any new medications, supplements, or products? (please list below)     no Scalp pain   no Scalp burning   yes Scalp itching    no Eyebrow changes    no Eyelash changes   na Beard changes    no Other body hair changes    no Nail changes    no Additional symptoms? (please list below)     - Overall course: Feels her hair has been progressively thinner over the last 3-4 months.Using laser comb once a month and fluocinolone oil 1-2 times a month. Helping a little with itchiness. Showers 2-3 times a week. Has had a stressful last couple years. 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  Post-polio symptoms have included some leg pain and atrophy. Now using a walker. Had a skin infection on her finger that was cultured and resulted as MRSA positive.    She also has some redness and itching underneath her right breast. She keeps the area clean and would like some recommendations on topical treatments.    Patient is otherwise feeling well, in usual state of health, and has no additional skin concerns today.       Labs:  None reviewed.    Physical Exam:  Vitals: There were no vitals taken for this visit.  GEN: Well developed, well-nourished, in no acute distress, in a pleasant mood.    SKIN: Focused examination of scalp, face and trunk was performed.  - Blayne part width of 1.3 in the vertex, 1.1 frontal scalp  - [place] tree pattern hair loss.  - The layers of hair regrowth layers were noted to be   - 1 cm for the first  - 3-4 cm at the second  - 5-6 cm at the third   - no diffuse erythema   - no perifollicular erythema  - some perifollicular scale   - no scaling of the scalp   - normal eyelash density  - normal eyebrow density  - no nail pitting or dystrophy   - no scalp folliculitis/pustules   - In comparison to prior photographs, improving.  - erythema under breasts  - No other lesions of concern on areas examined.                         Medications:  Current Outpatient Medications   Medication     amLODIPine (NORVASC) 2.5 MG tablet     aspirin (ASA) 81 MG EC tablet     atenolol (TENORMIN) 50 MG tablet     azelastine (ASTEPRO) 0.15 % nasal spray     Cholecalciferol (VITAMIN D-3 PO)     clotrimazole (LOTRIMIN) 1 % cream     Continuous Blood Gluc Sensor (FREESTYLE BRANDY 14 DAY SENSOR) MISC     diphenhydrAMINE-acetaminophen (TYLENOL PM)  MG tablet     ezetimibe (ZETIA) 10 MG tablet     famotidine (PEPCID) 20 MG tablet     fenofibrate (TRICOR) 145 MG tablet     ferrous sulfate (IRON) 325 (65 FE) MG tablet     fexofenadine (ALLEGRA) 180 MG tablet     fluocinolone acetonide (DERMA SMOOTHE/FS  BODY) 0.01 % external oil     fluticasone (FLONASE) 50 MCG/ACT spray     furosemide (LASIX) 20 MG tablet     glipiZIDE (GLUCOTROL XL) 5 MG 24 hr tablet     Icosapent Ethyl 1 g CAPS     insulin pen needle (B-D U/F) 31G X 5 MM miscellaneous     lisinopril (ZESTRIL) 40 MG tablet     MULTIVITAMINS OR TABS     nitroGLYcerin (NITROSTAT) 0.4 MG sublingual tablet     nystatin (MYCOSTATIN) cream     nystatin-triamcinolone (MYCOLOG II) cream     ondansetron (ZOFRAN) 4 MG tablet     order for DME     order for DME     pantoprazole (PROTONIX) 40 MG EC tablet     pregabalin (LYRICA) 50 MG capsule     semaglutide (OZEMPIC) 2 MG/1.5ML SOPN pen     SYNTHROID 112 MCG tablet     TRESIBA FLEXTOUCH 100 UNIT/ML pen     tretinoin (RETIN-A) 0.025 % external cream     No current facility-administered medications for this visit.      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)     Anemia due to blood loss, acute     ACP (advance care planning)     Postsurgical hypothyroidism     Type 2 diabetes, HbA1c goal < 7% (H)     Gastroparesis     Papillary carcinoma of thyroid (H)     Alopecia     Pulmonary nodule     Esophageal reflux     Dermatitis     Acne rosacea     Diabetic gastroparesis (H)     Poliomyelitis     Left knee pain     Carotid stenosis     Right shoulder pain     Type 2 diabetes mellitus with other specified complication (H)     Insufficiency, arterial, peripheral (H)     Allergic dermatitis due to other chemical product     Normocytic anemia     Morbid obesity (H)     Mild major depression (H)     CKD (chronic kidney disease) stage 3, GFR 30-59 ml/min (H)     Renal artery stenosis (H)     Neuropathic pain     Mild major depression (H)     Past  Medical History:   Diagnosis Date     Abdominal adhesions 1984, 96,99    s/p lysis     Abdominal adhesions      Acne rosacea      Allergic rhinitis      Allergic rhinitis, cause unspecified      Alopecia      Anemia      CAD (coronary artery disease)      Carotid stenosis      CKD (chronic kidney disease) stage 2, GFR 60-89 ml/min      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)      CPAP (continuous positive airway pressure) dependence      Depression      Diabetic gastroparesis (H)      Diet-controlled type 2 diabetes mellitus (H)      DM2 (diabetes mellitus, type 2) (H)      DVT (deep venous thrombosis) (H)      DVT of axillary vein, acute right (H)      Fibromyalgia      Gastro-oesophageal reflux disease      GERD (gastroesophageal reflux disease)      Hernia of unspecified site of abdominal cavity without mention of obstruction or gangrene     bilateral     History of blood transfusion     10/ 1980     HTN (hypertension)      HTN (hypertension)      Hypertriglyceridemia      Hypertriglyceridemia      Hypokalemia      Hypothyroidism      Obstructive sleep apnea      ANNETTE (obstructive sleep apnea)      ANNETTE on CPAP      Osteoarthritis of glenohumeral joint      Papillary carcinoma of thyroid (H)     s/p thyroidectomy - Ruegemer     Papillary carcinoma of thyroid (H)      PE (pulmonary embolism)      Poliomyelitis     poor circulation right leg     Poliomyelitis      Postsurgical hypothyroidism     s/p papillary thryoid cancer - Ruegemer     Pulmonary embolism (H)      Pulmonary embolus (H)      Pulmonary nodule      Rosacea      S/P carpal tunnel release     bilateral     S/P hardware removal 01/2014    still with lingering foot pain     S/P shoulder surgery     bilateral     Septic joint (H)     right knee     Septic joint of right knee joint (H)      Venous insufficiency      Venous insufficiency      Venous thrombosis 1999    right axillary vein       CC Referred Self, MD  No address on file on close of this encounter.

## 2022-11-07 ENCOUNTER — OFFICE VISIT (OUTPATIENT)
Dept: DERMATOLOGY | Facility: CLINIC | Age: 77
End: 2022-11-07
Payer: MEDICARE

## 2022-11-07 DIAGNOSIS — L65.8 FEMALE PATTERN HAIR LOSS: ICD-10-CM

## 2022-11-07 DIAGNOSIS — L23.5 ALLERGIC DERMATITIS DUE TO OTHER CHEMICAL PRODUCT: Primary | ICD-10-CM

## 2022-11-07 DIAGNOSIS — L30.9 DERMATITIS: ICD-10-CM

## 2022-11-07 DIAGNOSIS — L65.9 LOSS OF HAIR: ICD-10-CM

## 2022-11-07 PROCEDURE — 99213 OFFICE O/P EST LOW 20 MIN: CPT | Performed by: DERMATOLOGY

## 2022-11-07 ASSESSMENT — PAIN SCALES - GENERAL: PAINLEVEL: NO PAIN (0)

## 2022-11-07 NOTE — NURSING NOTE
Dermatology Rooming Note    Chasity Hodgson's goals for this visit include:   Chief Complaint   Patient presents with     Hair Loss     Sammie is here for a HL follow-up. States condition has slightly worsened since last seen.     Albert Franklin, EMT

## 2022-11-07 NOTE — LETTER
11/7/2022       RE: Chasity Hodgson  [id number]  [place] [facility] [address]     Dear Colleague,    Thank you for referring your patient, Chasity Hodgson, to the [facility]ERMAT[facility] [place] at [facility]. Please see a copy of my visit note below.    [facility] Dermatology Note  Encounter Date: Nov 7, 2022  Office Visit     Dermatology Problem List:  1. Female pattern hair loss  - Current tx: laser comb up to 3x weekly (switching to band 8/2021), free and clear shampoo, fluocinolone acetonide 0.01% oil once per week  - Previously on 1/2 tab of minoxidil 2.5 mg daily, discontinued secondary to elevated creatinine 10/2020, has since stabilized  -Baseline Hair Metrix 8/16/2021   2. Tinea corporis or candida/yeast infection - resolved  - Nystatin powder, OTC lotrimin cream   3. Rosacea - stable  - Vanicream  4.  Venous stasis dermatitis - stable  - Vanicream  5. Allergic contact dermatitis - stable  - Uses fragrance-free products  - Patch testing revealed mild reaction to Balsam of [place], fragrance mix, and a strong reaction to paraphenylenediamine.   6. Xerosis cutis  -Recommend continuing use of a gentle, fragrance-free emollient based moisturizer such as Vanicream or CeraVe.   7. Abrasion , right upper arm 02/15/2021.   - Monitor, photo taken today.   8. Milia, right lateral eye  - Removed 8/16/2021   - prior tx: tretinoin  ____________________________________________    Assessment & Plan:     # Female pattern hair loss- stable.  # Hx of allergic contact dermatitis of scalp  Hair loss is overall stable but has undergone some stressful health events recently. Will continue with the current plan.   - Restart fluocinolone 0.01% oil once per week for scalp pruritus and dryness.  - Continue to use laser headband or comb at least 3 times per week.  - Continue to use of Free & Clear shampoo.    Procedures Performed:    {kkprocedurenotes:111443}    Follow-up: {kkfollowup:855980}    Staff and Scribe:     Scribe Disclosure:  I, Kavon Stein, am serving as a scribe to document services personally performed by Renetta Meyer MD based on data collection and the provider's statements to me.     ***  ____________________________________________    CC: No chief complaint on file.    HPI:  Ms. Chasity Hodgson is a 77 year old female who presents as a return patient for follow-up of hair loss, diagnosed as female pattern hair loss.   - Last seen virtually on 4/18/2022  - Shedding or thinning, or both: ***  - Current tx: ***laser comb up to 3x weekly (switching to band 8/2021), free and clear shampoo, fluocinolone acetonide 0.01% oil once per week  - If using Rogaine, 1 cannister lasts how long: ***   - Scalp or hair care habits/products: ***  ***Yes/No Any new medications, supplements, or products? (please list below)     ***Yes/No Scalp pain   ***Yes/No Scalp burning   ***Yes/No Scalp itching    ***Yes/No Eyebrow changes    ***Yes/No Eyelash changes   ***Yes/No Beard changes    ***Yes/No Other body hair changes    ***Yes/No Nail changes    ***Yes/No Additional symptoms? (please list below)     - Overall course: ***  - COVID status: ***yes/no/unknown, ***date(if known)    Patient is otherwise feeling well, in usual state of health, and has no additional skin concerns today.     {kkROSoptional:986582}    Labs:  {kkreviewedlabs:237802} reviewed.    Physical Exam:  Vitals: There were no vitals taken for this visit.  GEN: Well developed, well-nourished, in no acute distress, in a pleasant mood.    SKIN: {kkSkinExam:598837}  - [place]eron type: ***  - Blayne part width of ***  - The layers of hair regrowth layers were noted to be  - *** cm for the first  - *** cm at the second  - *** cm at the third   - *** cm at the fourth   - *** diffuse erythema   - *** perifollicular erythema  - *** perifollicular scale   - *** scaling of  the scalp   - *** negative hair pull test   - *** normal eyelash density  - *** normal eyebrow density  - *** no nail pitting or dystrophy   - *** scalp folliculitis/pustules   - In comparison to prior photographs, ***  - {Skin Exam Derm:997643}.   - No other lesions of concern on areas examined.     ***If FFA:  - R lateral forehead vein: elevated***/neutral***/depressed***  - Midline forehead vein: elevated***/neutral***/depressed***  - L ateral forehead vein: elevated***/neutral***/depressed***  - *** facial papules   - *** measurement of lateral canthus to lateral hairline   - *** measurement nasal bridge to anterior hairline    Medications:  Current Outpatient Medications   Medication     amLODIPine (NORVASC) 2.5 MG tablet     aspirin (ASA) 81 MG EC tablet     atenolol (TENORMIN) 50 MG tablet     azelastine (ASTEPRO) 0.15 % nasal spray     Cholecalciferol (VITAMIN D-3 PO)     clotrimazole (LOTRIMIN) 1 % cream     Continuous Blood Gluc Sensor (FREESTYLE BRANDY 14 DAY SENSOR) MISC     diphenhydrAMINE-acetaminophen (TYLENOL PM)  MG tablet     ezetimibe (ZETIA) 10 MG tablet     famotidine (PEPCID) 20 MG tablet     fenofibrate (TRICOR) 145 MG tablet     ferrous sulfate (IRON) 325 (65 FE) MG tablet     fexofenadine (ALLEGRA) 180 MG tablet     fluocinolone acetonide (DERMA SMOOTHE/FS BODY) 0.01 % external oil     fluticasone (FLONASE) 50 MCG/ACT spray     furosemide (LASIX) 20 MG tablet     glipiZIDE (GLUCOTROL XL) 5 MG 24 hr tablet     Icosapent Ethyl 1 g CAPS     insulin pen needle (B-D U/F) 31G X 5 MM miscellaneous     lisinopril (ZESTRIL) 40 MG tablet     MULTIVITAMINS OR TABS     nitroGLYcerin (NITROSTAT) 0.4 MG sublingual tablet     nystatin (MYCOSTATIN) cream     nystatin-triamcinolone (MYCOLOG II) cream     ondansetron (ZOFRAN) 4 MG tablet     order for DME     order for DME     pantoprazole (PROTONIX) 40 MG EC tablet     pregabalin (LYRICA) 50 MG capsule     semaglutide (OZEMPIC) 2 MG/1.5ML SOPN pen      SYNTHROID 112 MCG tablet     TRESIBA FLEXTOUCH 100 UNIT/ML pen     tretinoin (RETIN-A) 0.025 % external cream     No current facility-administered medications for this visit.      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)     Anemia due to blood loss, acute     ACP (advance care planning)     Postsurgical hypothyroidism     Type 2 diabetes, HbA1c goal < 7% (H)     Gastroparesis     Papillary carcinoma of thyroid (H)     Alopecia     Pulmonary nodule     Esophageal reflux     Dermatitis     Acne rosacea     Diabetic gastroparesis (H)     Poliomyelitis     Left knee pain     Carotid stenosis     Right shoulder pain     Type 2 diabetes mellitus with other specified complication (H)     Insufficiency, arterial, peripheral (H)     Allergic dermatitis due to other chemical product     Normocytic anemia     Morbid obesity (H)     Mild major depression (H)     CKD (chronic kidney disease) stage 3, GFR 30-59 ml/min (H)     Renal artery stenosis (H)     Neuropathic pain     Mild major depression (H)     Past Medical History:   Diagnosis Date     Abdominal adhesions 1984, 96,99    s/p lysis     Abdominal adhesions      Acne rosacea      Allergic rhinitis      Allergic rhinitis, cause unspecified      Alopecia      Anemia      CAD (coronary artery disease)      Carotid stenosis      CKD (chronic kidney disease) stage 2, GFR 60-89 ml/min      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)      CPAP (continuous positive airway pressure) dependence      Depression      Diabetic gastroparesis (H)      Diet-controlled type 2 diabetes mellitus (H)      DM2 (diabetes mellitus, type 2) (H)      DVT (deep venous thrombosis) (H)      DVT of axillary vein, acute right (H)       Fibromyalgia      Gastro-oesophageal reflux disease      GERD (gastroesophageal reflux disease)      Hernia of unspecified site of abdominal cavity without mention of obstruction or gangrene     bilateral     History of blood transfusion     10/ 1980     HTN (hypertension)      HTN (hypertension)      Hypertriglyceridemia      Hypertriglyceridemia      Hypokalemia      Hypothyroidism      Obstructive sleep apnea      ANNETTE (obstructive sleep apnea)      ANNETTE on CPAP      Osteoarthritis of glenohumeral joint      Papillary carcinoma of thyroid (H)     s/p thyroidectomy - Ruegemer     Papillary carcinoma of thyroid (H)      PE (pulmonary embolism)      Poliomyelitis     poor circulation right leg     Poliomyelitis      Postsurgical hypothyroidism     s/p papillary thryoid cancer - Ruegemer     Pulmonary embolism (H)      Pulmonary embolus (H)      Pulmonary nodule      Rosacea      S/P carpal tunnel release     bilateral     S/P hardware removal 01/2014    still with lingering foot pain     S/P shoulder surgery     bilateral     Septic joint (H)     right knee     Septic joint of right knee joint (H)      Venous insufficiency      Venous insufficiency      Venous thrombosis 1999    right axillary vein       CC Referred Self, MD  No address on file on close of this encounter.      Again, thank you for allowing me to participate in the care of your patient.      Sincerely,    Renetta Meyer MD

## 2022-11-09 ENCOUNTER — TRANSFERRED RECORDS (OUTPATIENT)
Dept: HEALTH INFORMATION MANAGEMENT | Facility: CLINIC | Age: 77
End: 2022-11-09

## 2022-11-15 ENCOUNTER — NURSE TRIAGE (OUTPATIENT)
Dept: FAMILY MEDICINE | Facility: CLINIC | Age: 77
End: 2022-11-15

## 2022-11-15 NOTE — TELEPHONE ENCOUNTER
"Pt called the clinic.     1. COVID-19 DIAGNOSIS: \"Who made your COVID-19 diagnosis?\" \"Was it confirmed by a positive lab test or self-test?\" If not diagnosed by a doctor (or NP/PA), ask \"Are there lots of cases (community spread) where you live?\" Note: See public health department website, if unsure.      Positive COVID home test today.   2. COVID-19 EXPOSURE: \"Was there any known exposure to COVID before the symptoms began?\" CDC Definition of close contact: within 6 feet (2 meters) for a total of 15 minutes or more over a 24-hour period.       Unknown. [facility] on Sunday.   3. ONSET: \"When did the COVID-19 symptoms start?\"       Yesterday.   4. WORST SYMPTOM: \"What is your worst symptom?\" (e.g., cough, fever, shortness of breath, muscle aches)      Cough and body aches.   5. COUGH: \"Do you have a cough?\" If Yes, ask: \"How bad is the cough?\"        Slept on and off this night.   6. FEVER: \"Do you have a fever?\" If Yes, ask: \"What is your temperature, how was it measured, and when did it start?\"      Chills - has has not checked temp. Advised Tylenol. Advised to check temp. Sore neck yesterday - feels better today.   7. RESPIRATORY STATUS: \"Describe your breathing?\" (e.g., shortness of breath, wheezing, unable to speak)       No. Some tightness in the chest maybe from the cough. No chest pains.   8. BETTER-SAME-WORSE: \"Are you getting better, staying the same or getting worse compared to yesterday?\"  If getting worse, ask, \"In what way?\"      Worse today than yesterday.   9. HIGH RISK DISEASE: \"Do you have any chronic medical problems?\" (e.g., asthma, heart or lung disease, weak immune system, obesity, etc.)       Diabetes, overweight.   10. VACCINE: \"Have you had the COVID-19 vaccine?\" If Yes, ask: \"Which one, how many shots, when did you get it?\"        6 total vaccines.   11. BOOSTER: \"Have you received your COVID-19 booster?\" If Yes, ask: \"Which one and when did you get it?\"        4 boosters.   12. PREGNANCY: \"Is " "there any chance you are pregnant?\" \"When was your last menstrual period?\"        NA.   13. OTHER SYMPTOMS: \"Do you have any other symptoms?\"  (e.g., chills, fatigue, headache, loss of smell or taste, muscle pain, sore throat)        Headache, fatigue, chills, sore throat in the beginning - not anymore.   14. O2 SATURATION MONITOR:  \"Do you use an oxygen saturation monitor (pulse oximeter) at home?\" If Yes, ask \"What is your reading (oxygen level) today?\" \"What is your usual oxygen saturation reading?\" (e.g., 95%)        Does not have.     Patient interested in Paxlovid or any available antivirals. Patient was transferred to schedule with COVID doctor at [facility].     Reason for Disposition    HIGH RISK for severe COVID complications (e.g., weak immune system, age > 64 years, obesity with BMI > 25, pregnant, chronic lung disease or other chronic medical condition) (Exception: Already seen by PCP and no new or worsening symptoms.)    [1] HIGH RISK patient AND [2] influenza is widespread in the community AND [3] ONE OR MORE respiratory symptoms: cough, sore throat, runny or stuffy nose    Additional Information    Negative: SEVERE difficulty breathing (e.g., struggling for each breath, speaks in single words)    Negative: Difficult to awaken or acting confused (e.g., disoriented, slurred speech)    Negative: Bluish (or gray) lips or face now    Negative: Shock suspected (e.g., cold/pale/clammy skin, too weak to stand, low BP, rapid pulse)    Negative: Sounds like a life-threatening emergency to the triager    Negative: SEVERE or constant chest pain or pressure  (Exception: Mild central chest pain, present only when coughing.)    Negative: MODERATE difficulty breathing (e.g., speaks in phrases, SOB even at rest, pulse 100-120)    Negative: Oxygen level (e.g., pulse oximetry) 90 percent or lower    Negative: Headache and stiff neck (can't touch chin to chest)    Negative: Chest pain or pressure    Negative: Patient " sounds very sick or weak to the triager    Negative: MILD difficulty breathing (e.g., minimal/no SOB at rest, SOB with walking, pulse <100)    Negative: Fever > 103 F (39.4 C)    Negative: [1] Fever > 101 F (38.3 C) AND [2] over 60 years of age    Negative: [1] Fever > 100.0 F (37.8 C) AND [2] bedridden (e.g., nursing home patient, CVA, chronic illness, recovering from surgery)    Negative: [1] HIGH RISK patient AND [2] influenza exposure within the last 7 days AND [3] ONE OR MORE respiratory symptoms: cough, sore throat, runny or stuffy nose    Negative: Oxygen level (e.g., pulse oximetry) 91 to 94 percent    Protocols used: CORONAVIRUS (COVID-19) DIAGNOSED OR [id number]-M-PM 1.18.2022

## 2022-11-16 ENCOUNTER — VIRTUAL VISIT (OUTPATIENT)
Dept: FAMILY MEDICINE | Facility: CLINIC | Age: 77
End: 2022-11-16
Payer: MEDICARE

## 2022-11-16 DIAGNOSIS — N18.30 STAGE 3 CHRONIC KIDNEY DISEASE, UNSPECIFIED WHETHER STAGE 3A OR 3B CKD (H): ICD-10-CM

## 2022-11-16 DIAGNOSIS — U07.1 INFECTION DUE TO 2019 NOVEL CORONAVIRUS: Primary | ICD-10-CM

## 2022-11-16 PROCEDURE — 99443 PR PHYSICIAN TELEPHONE EVALUATION 21-30 MIN: CPT | Mod: 95 | Performed by: INTERNAL MEDICINE

## 2022-11-16 ASSESSMENT — PATIENT HEALTH QUESTIONNAIRE - PHQ9
SUM OF ALL RESPONSES TO PHQ QUESTIONS 1-9: 3
SUM OF ALL RESPONSES TO PHQ QUESTIONS 1-9: 3
10. IF YOU CHECKED OFF ANY PROBLEMS, HOW DIFFICULT HAVE THESE PROBLEMS MADE IT FOR YOU TO DO YOUR WORK, TAKE CARE OF THINGS AT HOME, OR GET ALONG WITH OTHER PEOPLE: NOT DIFFICULT AT ALL

## 2022-11-16 NOTE — PROGRESS NOTES
Sammie is a 77 year old who is being evaluated via a billable telephone visit.      What phone number would you like to be contacted at? [phone]  How would you like to obtain your AVS? MyChart    Assessment & Plan     Infection due to 2019 novel coronavirus  Started having symptoms on Sunday  She is still having some body aches and sore throat  In general slowly getting better  No shortness of breath  She was wondering about the treatment with antiviral Paxlovid  We discussed the side effects of the drug  We discussed about rebound COVID  Discussed about quarantine measures  Discussed about bitter taste/nausea/diarrhea/dizziness as possible side effects  She voiced understanding  She will leave it till tomorrow and if she is not getting better then start the medicine  She has history of CKD  For this reason alternate dosing will be used  Discussed about monitoring her sats with pulse oximeter  She does have 1 at home  Medication directions check  There are no notable drug drug interactions  - nirmatrelvir and ritonavir (PAXLOVID) therapy pack; Take 2 tablets by mouth 2 times daily for 5 days (Take 1 tablet of Nirmatelvir and 1 tablet of Ritonavir twice daily for 5 days)    Stage 3 chronic kidney disease, unspecified whether stage 3a or 3b CKD (H)  Her baseline creatinine is around 1.2 to 1.3  For this reason alternate dosing will be used        30 minutes spent on the date of the encounter doing chart review, history and exam, documentation and further activities per the note  {Provider  Link to [facility] Help Grid :629125}         Return in about 4 weeks (around 12/14/2022), or if symptoms worsen or fail to improve.    Russel Corral MD  [facility]   Sammie is a 77 year old, presenting for the following health issues:  Covid Concern      HPI       COVID-19 Symptom Review  How many days ago did these symptoms start? 11/14/22    Are any of the following symptoms significant for  you?    New or worsening difficulty breathing? No    Worsening cough? Yes, it's a dry cough.     Fever or chills? Yes, I felt feverish or had chills.    Headache: YES    Sore throat: YES- on Sunday, not current    Chest pain: YES- tightness    Diarrhea: No    Body aches? YES    What treatments has patient tried? Acetaminophen   Does patient live in a nursing home, group home, or shelter? No  Does patient have a way to get food/medications during quarantined? Yes, I have a friend or family member who can help me.                  Review of Systems   Constitutional, HEENT, cardiovascular, pulmonary, gi and gu systems are negative, except as otherwise noted.      Objective           Vitals:  No vitals were obtained today due to virtual visit.    Physical Exam   healthy, alert and no distress  PSYCH: Alert and oriented times 3; coherent speech, normal   rate and volume, able to articulate logical thoughts, able   to abstract reason, no tangential thoughts, no hallucinations   or delusions  Her affect is normal  RESP: No cough, no audible wheezing, able to talk in full sentences  Remainder of exam unable to be completed due to telephone visits                Phone call duration: 13 minutes    Answers for HPI/ROS submitted by the patient on 11/16/2022  If you checked off any problems, how difficult have these problems made it for you to do your work, take care of things at home, or get along with other people?: Not difficult at all  PHQ9 TOTAL SCORE: 3

## 2022-11-25 ENCOUNTER — NURSE TRIAGE (OUTPATIENT)
Dept: NURSING | Facility: CLINIC | Age: 77
End: 2022-11-25

## 2022-11-25 NOTE — TELEPHONE ENCOUNTER
Pt states she has covid questions. Declined triage. Just wants to ask questions. She began having sore throat and headache 11/13. Tested + 11/15. Had VV 11/16. Asks how long she is contagious? And how long will she test +? Answered questions per Covid guideline.     Reason for Disposition    COVID-19 Home Isolation, questions about    COVID-19 Testing, questions about    [1] COVID-19 diagnosed by doctor (or NP/PA) AND [2] mild symptoms (e.g., cough, fever, others) AND [3] no complications or SOB    Protocols used: CORONAVIRUS (COVID-19) DIAGNOSED OR [id number] 1.18.2022

## 2022-11-29 ASSESSMENT — ENCOUNTER SYMPTOMS
TREMORS: 0
NECK MASS: 1
WEIGHT LOSS: 0
HYPOTENSION: 0
WHEEZING: 0
WEIGHT GAIN: 1
POSTURAL DYSPNEA: 1
CHILLS: 1
SPEECH CHANGE: 0
LOSS OF CONSCIOUSNESS: 0
FLANK PAIN: 0
TROUBLE SWALLOWING: 0
SINUS CONGESTION: 1
BLOOD IN STOOL: 0
DIZZINESS: 1
ARTHRALGIAS: 0
SLEEP DISTURBANCES DUE TO BREATHING: 0
SYNCOPE: 0
INCREASED ENERGY: 1
MYALGIAS: 1
HALLUCINATIONS: 0
HEMOPTYSIS: 0
JOINT SWELLING: 0
NAIL CHANGES: 1
SHORTNESS OF BREATH: 1
HEMATURIA: 0
NIGHT SWEATS: 1
DYSURIA: 0
ALTERED TEMPERATURE REGULATION: 0
POLYPHAGIA: 0
FATIGUE: 1
DIARRHEA: 1
DIFFICULTY URINATING: 0
HEADACHES: 1
BOWEL INCONTINENCE: 0
MUSCLE WEAKNESS: 1
PARALYSIS: 0
MUSCLE CRAMPS: 0
NECK PAIN: 1
COUGH DISTURBING SLEEP: 1
DYSPNEA ON EXERTION: 0
HYPERTENSION: 1
ABDOMINAL PAIN: 1
LEG PAIN: 1
BRUISES/BLEEDS EASILY: 0
EXERCISE INTOLERANCE: 1
TASTE DISTURBANCE: 0
RECTAL PAIN: 1
STIFFNESS: 0
FEVER: 1
SMELL DISTURBANCE: 0
HEARTBURN: 1
HOARSE VOICE: 1
BLOATING: 1
WEAKNESS: 1
TINGLING: 0
SINUS PAIN: 0
DECREASED APPETITE: 0
SNORES LOUDLY: 0
LIGHT-HEADEDNESS: 1
SPUTUM PRODUCTION: 0
SKIN CHANGES: 0
CONSTIPATION: 0
NUMBNESS: 0
COUGH: 1
SEIZURES: 0
NAUSEA: 1
ORTHOPNEA: 1
BACK PAIN: 1
POOR WOUND HEALING: 0
JAUNDICE: 0
SORE THROAT: 1
VOMITING: 0
MEMORY LOSS: 0
DISTURBANCES IN COORDINATION: 1
PALPITATIONS: 0
SWOLLEN GLANDS: 1
POLYDIPSIA: 0

## 2022-11-29 NOTE — PROGRESS NOTES
MEDICAL ICU PROGRESS NOTE  11/29/2022      Date of Service (when I saw the patient): 11/29/2022    ASSESSMENT: Arainna Siddiqi is a 64 year old female with a history of morbid obesity, CARLOS, hypertension, HFpEF, paroxysmal A. Fib (apixiban), MAG, factor IV deficiency and CKD who was admitted for hypercapnic respiratory failure likely due to pneumonia, necessitating intubation. 11/23 extubated to BiPAP due to chronic CO2 retention. Now with concerns for RV heart failure, pulmonary hypertension, severe volume overload.     CHANGES and MAJOR THINGS TODAY:   - IV Bumex 4 mg TID; Goal net negative 1 to 2L  - Zosyn stop date 11/30  - discharge dobutamine  - plan to place an A line    PLAN:     Neuro:  # Pain and sedation  # Acute encephalopathy- resolved  - Monitor neurological status. Notify provider for any acute changes in exam  Analgesia: PRN acetaminophen, oxycodone   Sedation:   - PRN hydroxyzine 25-50mg q 6H   - PRN Quetiapine 25mg daily    - Melatonin 6 mg q HS   - Holding PTA cyclobenzaprine, tramadol and benadryl     Pulmonary:  # Acute hypoxic and hypercarbic respiratory failure   # Pulmonary Hypertension  # Pulmonary edema/fluid overload (see CV)  Patient extubated to BiPAP 11/23. CT PE/CAP unremarkable this admission.    - Scheduled duonebs and hypertonic saline nebs for thick secretions QID  - BiPAP  - IS q 2H WA  - see the cardiology section for the plan regarding her pulmonary hypertension    # CAP  Status post 1 course of treatment with ceftriaxone.    -Currently undergoing a second course of Zosyn.    # CARLOS onCPAP  # Obesity hypoventilation syndrome   # Hx Smoking (30 pack per year; stopped 2002)  - BiPAP       Cardiovascular:  # Intermittent hypotension  # Hx HFpEF (EF 60 to 65% per echo on 11/26/2022)    # Decompensated right heart failure  # Fluid overload  # pulmonary hypertension  Patient's [place]-Juaquin catheter measurements are consistent with volume overload and pulmonary hypertension.  Known history  "  SUBJECTIVE:   Chasity Hodgson is a 73 year old female who presents to clinic today for the following health issues:      Nausea      Duration: on and off for a month    Description (location/character/radiation): Stomach    Intensity:  moderate    Accompanying signs and symptoms: Vomiting    History (similar episodes/previous evaluation): None    Precipitating or alleviating factors: None    Therapies tried and outcome: None       Hypertension Follow-up      Outpatient blood pressures are being checked at home.  Results are 134/60.    Low Salt Diet: Doesn't use much salt and doesn't add salt at the table.       [facility]  CLINIC PROGRESS NOTE    Subjective:  Nausea   Chasity Hodgson has had occasional nausea over the past month.  She has had reports of cramping and \"full\" feeling.  She has had poor appetite and has been taking ondansetron as needed for her nausea.  She had not been taking metoclopramide.  She started this last week on Friday.  She felt that this did help.  She did have about 3 episodes of vomiting prior to starting metoclopramide.   Right sided low back pain   Chasity Hodgsno was seen last week for right side low back pain associated with above nausea.  Her symptoms of back pain have improved.     Past medical history, medications, allergies, social history, family history reviewed and updated in [facility] as of 11/5/2018 .    ROS  CONSTITUTIONAL: no fatigue, no unexpected change in weight  SKIN: no worrisome rashes, no worrisome moles, no worrisome lesions  EYES: no acute vision problems or changes  ENT: no ear problems, no mouth problems, no throat problems  RESP: no significant cough, no shortness of breath  CV: no chest pain, no palpitations,  GI: no constipation  - colostomy working - stools soft and mushy, no blood in the stool, output is normally about 2 times per day of emptying the bag, but some days there is more output 2-4 times per day  : no frequency, no dysuria, no hematuria  MS: " "occasional puffy feeling in her hand and feet.  She is wearing compression stockings.  PSYCHIATRIC: no changes in mood or affect      Objective:  Vitals  /80 (BP Location: Left arm, Patient Position: Sitting, Cuff Size: Adult Large)  Pulse 92  Temp 98.2  F (36.8  C) (Tympanic)  Ht 5' 1\" (1.549 m)  Wt 211 lb 9.6 oz (96 kg)  SpO2 98%  BMI 39.98 kg/m2  GEN: Alert Oriented x3 NAD  HEENT: Atraumatic, normocephalic, neck supple, no thyromegaly, negative cervical adenopathy  CV: RRR no murmurs or rubs  PULM: CTA no wheezes or crackles  ABD: Soft, nontender nondistended, ostomy in place  SKIN: No visible skin lesion or ulcerations  EXT: legs wrapped bilaterally with compression stockings  NEURO: Gait and station stable, No focal neurologic deficits  PSYCH: Mood good, affect mood congruent    No images are attached to the encounter.    No results found for this or any previous visit (from the past 24 hour(s)).    Assessment/Plan:  Patient Instructions   (E11.69) Type 2 diabetes mellitus with other specified complication, with long-term current use of insulin (H)  (primary encounter diagnosis)  Comment: Plan to follow up in endocrinology.  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   Plan:        Follow up in 3 months    Disclaimer: This note consists of symbols derived from keyboarding, dictation and/or voice recognition software. As a result, there may be errors in the script that have gone undetected. Please consider this when interpreting information found in this chart.    Shola Benoit MD  [phone] " of CARLOS and HFpEF. 11/25 BLE duplex US: no above knee DVT.  Elevated BNP.  Normal cardiac output per swan Juaquin catheter (no suspicion for shunts which would artificially elevate these measurements).  - Holding prior to admission Furosemide, lisinopril apixaban, metoprolol, & Bumex   - Lymphedema consulted  - Trend daily weightsand obtain strict ins and outs   - MAP > 70  - Trend CVP  -Per cardiology recommendations, discontinue dobutamine and continue aggressive diuresis with 3 times daily Bumex.  Consider norepinephrine if vasopressor medications are needed.  -We will attempt to place an arterial line today.       # Demand induced troponin leak - improving  - 11/25 troponin 257-->158 --> 143 on 11/26    # Hx PAF (on apixaban PTA)  - A fib with RVR 11/25 AM: Amio gtt at 5ml/hr     GI/Nutrition:  # Risk for malnutrition  # Hypoalbuminemia  # Morbid obesity  # Nausea and vomiting   # Constipation-resolved  # Diarrhea   - RD following  - Compazine and Zofran PRN  - Bowel regimen: Senna and Miralax PRN   - Advance diet as tolerated, monitor for nausea       Renal/Fluids/Electrolytes:  # Volume overload  # Non-oliguric Acute on chronic kidney injury   # Metabolic alkalosis-presumed contraction alkalosis vs chronic hypoventilation syndrome  # Hx CKD (baseline creatinine 1.1)  Likely a component of venous ischemia due to fluid overload.  - Nephrology consult and appreciate recs: Strict intake and output     # Hyperkalemia - resolved  # Hypomagnesia - resolved   - Electrolyte replacement protocol     Endocrine:  # Risk for stress induced hyperglycemia  # Hx. DMII  - 11/25 Cortisol 15.5  - Every 4 hour blood glucose measurements  - Medium SSI     ID:  # Presumed CAP  # Leukocytosis - improving  Infection work up grossly unremarkable with cultures and virology. -Pulse S/p Ceftiaxone 7 day course (11/18-11/24)    - Zosyn empiric treatment for VAP/HAP (11/25--> stop date 11/30)    Cultures:     11/25 BC x 2 - NGTD    11/25 MRSA      11/25 UA reflex - bland    11/25 Sputum culuture - unremarkable     11/20 Sputum culture unremarkable    11/18 Sputum culture unremarkable    11/18 urine culture unremarkable    11/17 blood cultures x 2 NGTD    11/17 viral panels unremarkable    # Oral thrush   - Nystatin QID       Hematology:    # Hx MAG  # Hx Factor VII deficiency  Seen outpatient by hematology has a history oral/IV iron supplementation  - PTA ferrous gluconate 324 mg resumed  - Held-PTA apixaban (r/t pAfib)  - Heparin gtt for pafib ppx- therapeutic currently       Musculoskeletal/Skin:  #Hx Gout  #Hx hemosiderin deposition BLE  - PT/OT for ROM  - 11/18 uric acid 7.9  - PTA allopurinol        General Cares/Prophylaxis:    DVT Prophylaxis: Heparin infusion  GI Prophylaxis: None  Restraints: N/A  Family Communication: Emelia Siddiqi   Code Status: FULL     Lines/tubes/drains:  -[place]-Juaquin catheter  - PIVx1     Disposition:  - Medical ICU     Patient seen and findings/plan discussed with medical ICU staff, Dr. Larose.    Corby Panchal MD  Medicine Pediatrics  [facility]  Pager: [phone]    ====================================  INTERVAL HISTORY:   Course reviewed.  No acute events overnight.    Respiratory acidosis improved with wearing the BiPAP, but she continues to poorly tolerated due to a poor fit.  Overall feeling okay this morning.  Does have some mild discomfort in her feet.    OBJECTIVE:   1. VITAL SIGNS:   Temp:  [97  F (36.1  C)-98.3  F (36.8  C)] 98  F (36.7  C)  Pulse:  [] 86  Resp:  [9-30] 15  BP: (104-147)/(57-94) 127/71  FiO2 (%):  [40 %-70 %] 40 %  SpO2:  [93 %-99 %] 96 %  FiO2 (%): 40 %  Resp: 15    2. INTAKE/ OUTPUT:   I/O last 3 completed shifts:  In: 2313.77 [P.O.:840; I.V.:1473.77]  Out: 2810 [Urine:2810]    3. PHYSICAL EXAMINATION:    GEN: Morbidly obese female resting in bed in no acute distress.  HEENT:  Normocephalic, atraumatic, Bipap mask off.  CV: Sinus rhythm in the 70s.  Regular rate and  [id number]        rhythm with no murmurs.  PULM/CHEST: Course breath sounds bilaterally.  GI: normal bowel sounds, soft, generalized tenderness   : Baig catheter in place  EXTREMITIES: moving all extremities, peripheral pulses intact  NEURO: following commands, A&O x 4       4. LABS:   Arterial Blood Gases   Recent Labs   Lab 22  1119 22  0409 22  0149 22  0026   PH 7.23* 7.23* 7.21* 7.20*   PCO2 67* 67* 70* 73*   PO2 71* 83 83 77*   HCO3      Complete Blood Count   Recent Labs   Lab 22  0428 22  0408 22  0535   WBC 8.0 8.9 9.1 12.6*   HGB 12.3 12.3 13.4 14.3    181 221 238     Basic Metabolic Panel  Recent Labs   Lab 22  0802 22  0439 22  0415 22  0014 22  1548 22  1349 22  0435 22  0428 22  0416 22  0408   NA  --   --  147*  --   --  140  --  145  --  145   POTASSIUM  --   --  3.9  --   --  4.0  --  4.0  --  4.4   CHLORIDE  --   --  107  --   --  104  --  108*  --  107   CO2  --   --  25  --   --  22  --  26  --  26   BUN  --   --  69.8*  --   --  65.6*  --  69.5*  --  67.3*   CR  --   --  2.47*  --   --  1.05*  --  2.36*  --  2.12*   GLC 88 88 87 91   < > 277*   < > 91   < > 100*    < > = values in this interval not displayed.     Liver Function Tests  Recent Labs   Lab 22   AST 21   ALT 16   ALKPHOS 74   BILITOTAL 0.7   ALBUMIN 3.0*   INR 1.32*     Coagulation Profile  Recent Labs   Lab 22  0757 22  0415 22  2307 22  1541 22  0428   INR  --  1.32*  --   --   --    PTT 70*  --  93* 71* 35       5. RADIOLOGY:   Recent Results (from the past 24 hour(s))   Echo Limited   Result Value    LVEF  60-65%    Narrative    [id number]  [id number]  [id number]  [id number]^[facility]^BETZY     [facility],[place]  Echocardiography Laboratory  [address] [place] [id number]     Name: DUSTIN FERNANDES  MRN: [id number]  : 1958  Study Date:  11/26/2022 09:34 AM  Age: 64 yrs  Gender: Female  Patient Location: [facility]  Reason For Study: PHTN  Ordering Physician: BETZY MORALES  Performed By: Laureen Salazar RDCS     BSA: 2.5 m2  Height: 62 in  Weight: 380 lb  BP: 111/62 mmHg  ______________________________________________________________________________  Procedure  Limited Echocardiogram with portions of two-dimensional, color and spectral  Doppler performed. Contrast Optison. Technically difficult study.Extremely  poor acoustic windows. Optison (NDC #3761-4692-66) given intravenously.  Patient was given 5 ml mixture of 3 ml Optison and 6 ml saline. 4 ml wasted.  ______________________________________________________________________________  Interpretation Summary  Limited TTE. Technically difficult study.  The RV is not clearly visualized but the size appears to be normal and  function appears to be moderately reduced.  The estimated PA systolic pressure is 32 mmHg above the RA pressure.  Left ventricular function is normal.The ejection fraction is 60-65%.Left  ventricular cavity size is small.  This study was compared with the study from 11/18/2022. No significant changes  in RV size/function. The estimated PASP is higher.  ______________________________________________________________________________  Left Ventricle  Left ventricular function is normal.The ejection fraction is 60-65%. Left  ventricular cavity size is small.     Right Ventricle  The RV is not clearly visualized but the size appears to be normal and  function appears to be moderately reduced.     Mitral Valve  The mitral valve is normal. Trace mitral insufficiency is present.     Tricuspid Valve  The tricuspid valve is normal. Trace tricuspid insufficiency is present. Right  ventricular systolic pressure is 32mmHg above the right atrial pressure.     Pulmonic Valve  The valve leaflets are not well visualized. Trace pulmonic insufficiency is  present.     Vessels  Dilation of the inferior  vena cava is present with abnormal respiratory  variation in diameter. IVC diameter >2.1 cm collapsing <50% with sniff  suggests a high RA pressure estimated at 15 mmHg or greater.     Compared to Previous Study  This study was compared with the study from 11/18/2022 . No significant  changes in RV size/function. The estimated PASP is higher.     ______________________________________________________________________________  Report approved by: John Ladd 11/26/2022 12:19 PM     ______________________________________________________________________________      CT Chest Pulmonary Embolism w Contrast    Narrative    EXAM: CTA pulmonary angiogram, 11/26/2022 1:10 PM    HISTORY: Female sex; Not pregnant; No imaging to r/o PE in the last 24  hours; Pulmonary Embolism Rule-Out Criteria (PERC) score > 0; Revised  [place] Score (RGS) not >= 11; No D-dimer result available; D-dimer not  ordered    COMPARISON: Chest radiograph 11/25/2022.. CT abdomen 11/12/2021    TECHNIQUE: Volumetric CT images obtained through the chest with  contrast. Coronal and axial MIP reformatted images obtained.  Three-dimensional (3D) post-processed angiographic images were  reconstructed, archived to PACS and used in interpretation of this  study.     CONTRAST: Isovue 370 ml isovue 370 IV.    FINDINGS:     Vascular: There is good contrast opacification of the pulmonary  arterial vasculature. No pulmonary embolus.  Heart is enlarged with a  hypertrophic right ventricle. No evidence of acute right heart strain.  Mild reflux of contrast into the IVC and hepatic veins. No thoracic  aortic aneurysm. Dilated main pulmonary artery measuring up to 4 cm  and is significantly larger than the aorta.    Remaining Chest: Evaluation of the lungs is somewhat limited by  respiratory motion. Central tracheobronchial tree is patent. No  consolidation, mass, or suspicious pulmonary nodules. No pleural  effusion or pneumothorax. Bibasilar atelectasis. No  thoracic  lymphadenopathy. Thyroid is unremarkable. Small hiatal hernia.    Upper Abdomen: Limited evaluation demonstrates no acute findings.    Bones: No suspicious osseous lesions    Soft tissues: Unremarkable    Devices: Right IJ CVC tip is in the right atrium.      Impression    IMPRESSION:  1. Exam is negative for acute pulmonary embolism  2. Cardiomegaly with right ventricular hypertrophy and a dilated main  pulmonary artery measuring up to 4 cm, consistent with pulmonary  hypertension.    In the event of a positive result for acute pulmonary embolism  resulting in right heart strain, consider calling the   [facility] hospital  for PERT (Pulmonary Embolism Response Team)  Activation?    PERT -- Pulmonary Embolism Response Team (Multidisciplinary team  including cardiology, interventional radiology, critical care,  hematology)    I have personally reviewed the examination and initial interpretation  and I agree with the findings.    VIRIDIANA SINCLAIR MD         SYSTEM ID:  [id number]   CT Abdomen Pelvis w Contrast    Narrative    EXAMINATION: CT ABDOMEN PELVIS W CONTRAST  11/26/2022 1:11 PM      CLINICAL HISTORY: New sepsis, unknown source    COMPARISON: CT abdomen 11/12/2022    PROCEDURE COMMENTS: CT of the abdomen was performed with 87.0 cc of  Isovue-370 intravenous and oral contrast. Coronal and sagittal  reformatted images were obtained.    FINDINGS:    Liver: Normal parenchymal attenuation without focal mass.  Biliary system: Cholelithiasis. No intrahepatic or extrahepatic  biliary ductal dilatation.  Pancreas: No focal mass or dilation of the main pancreatic duct.  Stomach: Within normal limits.  Spleen: Within normal limits.  Adrenal glands: Within normal limits.  Kidneys: Atrophic kidneys bilaterally. No focal mass, hydronephrosis,  or stone.  Bladder: Within normal limits.  Reproductive organs: IUD in place  Colon: Within normal limits.  Small Bowel: Within normal limits.  Lymph nodes: No intra-abdominal  or pelvic lymphadenopathy.  Vasculature: Atherosclerotic calcifications of the abdominal aorta and  its branches without aneurysm.  Peritoneum: No intraabdominal free fluid or air.     Chest: Heart is enlarged. No pericardial effusion. No pneumothorax or  pleural effusion. Bibasilar atelectasis. Small hiatal hernia.    Bones: No suspicious osseous lesion. Degenerative changes of the  thoracolumbar spine.    Soft tissues: Large fat containing ventral hernia.      Impression    IMPRESSION:  1. No acute findings in the abdomen or pelvis.  2. Cholelithiasis without evidence for acute cholecystitis.    I have personally reviewed the examination and initial interpretation  and I agree with the findings.    SMITA LOPEZ MD         SYSTEM ID:  [id number]

## 2022-11-30 ENCOUNTER — TRANSFERRED RECORDS (OUTPATIENT)
Dept: HEALTH INFORMATION MANAGEMENT | Facility: CLINIC | Age: 77
End: 2022-11-30

## 2022-11-30 ENCOUNTER — OFFICE VISIT (OUTPATIENT)
Dept: ENDOCRINOLOGY | Facility: CLINIC | Age: 77
End: 2022-11-30
Payer: MEDICARE

## 2022-11-30 VITALS
HEART RATE: 61 BPM | SYSTOLIC BLOOD PRESSURE: 171 MMHG | DIASTOLIC BLOOD PRESSURE: 67 MMHG | BODY MASS INDEX: 41.99 KG/M2 | WEIGHT: 215 LBS | OXYGEN SATURATION: 100 %

## 2022-11-30 DIAGNOSIS — E11.9 TYPE 2 DIABETES, HBA1C GOAL < 7% (H): Primary | ICD-10-CM

## 2022-11-30 PROCEDURE — 99214 OFFICE O/P EST MOD 30 MIN: CPT | Performed by: INTERNAL MEDICINE

## 2022-11-30 RX ORDER — BLOOD-GLUCOSE SENSOR
1 EACH MISCELLANEOUS
Qty: 6 EACH | Refills: 3 | Status: SHIPPED | OUTPATIENT
Start: 2022-11-30 | End: 2023-09-27

## 2022-11-30 RX ORDER — INSULIN DEGLUDEC 100 U/ML
25 INJECTION, SOLUTION SUBCUTANEOUS EVERY MORNING
Qty: 5 ML | Refills: 11 | Status: SHIPPED | OUTPATIENT
Start: 2022-11-30 | End: 2023-03-21

## 2022-11-30 NOTE — NURSING NOTE
Chief Complaint   Patient presents with     Follow Up     Diabetes       Vitals:    11/30/22 1451   BP: (!) 171/67   BP Location: Right arm   Patient Position: Sitting   Cuff Size: Adult Regular   Pulse: 61   SpO2: 100%   Weight: 97.5 kg (215 lb)       Body mass index is 41.99 kg/m .      Anitha Orellana, BERNADETTE

## 2022-11-30 NOTE — PROGRESS NOTES
Chasity Hodgson is a 76 year old yo female here for follow-up of Diabetes Mellitus, and PTC/post surgical hypothyrodisim. She also has past medical istory of ANNETTE, gastroparesis, obesity,  Last seen by me August 2022    INTERVAL HISTORY:  - Had COVID infection, recovered  -  currently hospitalized at [facility] with sepsis  - Notes declining BG and if wakes up at 120, will need to eat or will go low  - Started ozempic, no trouble with nausea/worsening fullness after eating  - BG MUCH improved compared to previous  - stopped Glipizide after last visit      1) Diabetes Mellitus    Diabetes History:  Diagnosis: 2015, picked up on routine labs  Hospitalizations: None  Previous Regimens: Farxia (difficulty with frequent urination), Toujeo (was working well but insurance formulary changed 1/1/2021) At highest was at 48u daily, and then had profound low. At time of discontinuing, was down to 5u of toujeo. Previously on metformin.   Current Regimen: Tresiba 32u daily, Ozempic 0.5mg weekly    BG check- Freestyle Vini 14 day  Trends-   Overall high-- ave 163 (lower than August at 193, and May 224)                Complications: Gastroparesis- previously on reglan, overall not impactful    Last eye exam: Nov 22, 2021- no retinopathy, has cataracts  Foot Exam: [facility], checks every 60 days   Blood pressure- Lisinopril 40mg daily, Atenolol 50mg daily  Lipids- ezetimibe 10 mg daily, fenofibrate 145mg daily  SIOBHAN last 7/7- 25.52    2) Hypothyroidism s/p thyroidectomy for PTC  Diagnosis: known 3 nodules that were being monitored, and in 2002 s/p thyroidectomy (nodule in isthmus was cancer), s/p BUTLER (per patient, unclear if clean margins so received BUTLER, unknown dose)  Treatment: Levothyroxine 112mcg  Residual tissue on R that has been monitored, not growing.   Last ultrasound 10/2021- no tissue seen  last biomarkers 10/7/2021- TG <0.1, TGAb <0.4      INTERVAL HISTORY-- has felt enlarging L side of her neck, ? Lymph  node  Stable for 4-5 months     3) Vitamin D Deficiency  - Taking 2 tab daily  - Most recent 7/7/2021- 73  - PTH 9 (9/16/2021)--> recheck 21 10/21/2022    4) Hypertriglyceridemia  - On ezetimibe and fenofibrate, tried statin before with myalgias  - Eats minimal processed foods, minimal fried foods/baked treats    BP Readings from Last 3 Encounters:   11/30/22 (!) 171/67   10/12/22 130/72   09/08/22 (!) 147/66       Lab Results   Component Value Date    A1C 9.6 01/11/2022    A1C 9.5 10/07/2021    A1C 9.0 08/02/2021    A1C 8.7 07/07/2021    A1C 6.2 11/27/2020    A1C 6.8 09/10/2019       Recent Labs   Lab Test 01/11/22  1359 03/29/19  1355 08/01/17  0000 02/15/17  0000 02/11/16  0000 03/31/15  1327   CHOL 177 196   < > 142   < > 158   HDL 34* 27*   < > 31   < > 34*   LDL 81 99   < > 49   < > 51   TRIG 308* 349*   < > 309   < > 365*   CHOLHDLRATIO  --   --   --  4.6  --  4.6    < > = values in this interval not displayed.       Lab Results   Component Value Date    MICROL 36 01/11/2022    MICROL 23 07/07/2021     No results found for: MICROALBUMIN        Wt Readings from Last 3 Encounters:   11/30/22 97.5 kg (215 lb)   10/12/22 96.2 kg (212 lb)   09/08/22 95.7 kg (211 lb)       Current Outpatient Medications   Medication Sig Dispense Refill     amLODIPine (NORVASC) 2.5 MG tablet Take 2.5mg in AM, 2.5mg at noon, and 5mg at bedtime 360 tablet 3     aspirin (ASA) 81 MG EC tablet Take 81 mg by mouth daily       atenolol (TENORMIN) 50 MG tablet TAKE ONE TABLET BY MOUTH TWICE A  tablet 3     azelastine (ASTEPRO) 0.15 % nasal spray 1 spray       Cholecalciferol (VITAMIN D-3 PO) Take 2 tablets by mouth       clotrimazole (LOTRIMIN) 1 % cream Apply topically daily       Continuous Blood Gluc Sensor (FREESTYLE BRANDY 14 DAY SENSOR) MISC Apply 1 sensor and change every 14 days. 2 each 11     diphenhydrAMINE-acetaminophen (TYLENOL PM)  MG tablet Take 1 tablet by mouth At Bedtime Reported on 3/20/2017       ezetimibe  (ZETIA) 10 MG tablet Take 1 tablet (10 mg) by mouth daily 90 tablet 3     famotidine (PEPCID) 20 MG tablet Take 2 tablets (40 mg) by mouth as needed 180 tablet 3     fenofibrate (TRICOR) 145 MG tablet Take 1 tablet (145 mg) by mouth daily 90 tablet 3     ferrous sulfate (IRON) 325 (65 FE) MG tablet Take 325 mg by mouth daily (with breakfast)       fexofenadine (ALLEGRA) 180 MG tablet Take 180 mg by mouth daily. 120 0     fluocinolone acetonide (DERMA SMOOTHE/FS BODY) 0.01 % external oil Apply 2 mL to scalp once per week. Massage into scalp. Can be left in overnight or washed out after 4-6 hours. 118.28 mL 5     fluticasone (FLONASE) 50 MCG/ACT spray Spray 2 sprays in nostril daily 2 sprays in each nostril qd 1 Bottle 0     furosemide (LASIX) 20 MG tablet Take 1 tablet (20 mg) by mouth daily as needed (lower extremity edema) 90 tablet 3     Icosapent Ethyl 1 g CAPS Take 1 g by mouth 2 times daily 60 capsule 11     insulin pen needle (B-D U/F) 31G X 5 MM miscellaneous Use 1 pen needles daily or as directed. 90 each 3     lisinopril (ZESTRIL) 40 MG tablet Take 1 tablet (40 mg) by mouth daily 90 tablet 3     MULTIVITAMINS OR TABS ONE DAILY 100 3     nitroGLYcerin (NITROSTAT) 0.4 MG sublingual tablet Place 1 tablet (0.4 mg) under the tongue every 5 minutes as needed for chest pain (no more than 3 in one hour; after 3rd, call 911.) 25 tablet 3     nystatin (MYCOSTATIN) cream Apply topically daily as needed        nystatin-triamcinolone (MYCOLOG II) cream Apply topically daily as needed        ondansetron (ZOFRAN) 4 MG tablet Take 1 tablet by mouth every 6 hours as needed Reported on 3/20/2017       order for DME Equipment being ordered: Compression stockings - Knee High; 20-30 mmHg compression - note would like adhesive band to keep the stocking from sliding down 3 each 0     order for DME Equipment being ordered: Oral appliance for sleep apnea 1 Units 0     pantoprazole (PROTONIX) 40 MG EC tablet Take 40 mg by mouth 2  times daily       pregabalin (LYRICA) 50 MG capsule Take 1 capsule (50 mg) by mouth 2 times daily 60 capsule 2     semaglutide (OZEMPIC) 2 MG/1.5ML SOPN pen Inject 0.25 mg subcutaneous weekly x 4 weeks, then 0.5 mg weekly 3 mL 1     SYNTHROID 112 MCG tablet Take 1 tablet (112 mcg) by mouth daily Take 112 mcg daily except for Friday takes only 56 mcg.  Brand name Synthroid 90 tablet 3     TRESIBA FLEXTOUCH 100 UNIT/ML pen Inject 32 Units Subcutaneous every morning 5 mL 11     tretinoin (RETIN-A) 0.025 % external cream Use every night as tolerated - spot treat lesion 20 g 3     glipiZIDE (GLUCOTROL XL) 5 MG 24 hr tablet Take 1 tablet (5 mg) by mouth daily (Patient not taking: Reported on 11/16/2022) 90 tablet 3       Histories reviewed and updated in Epic.  Past Medical History:   Diagnosis Date     Abdominal adhesions 1984, 96,99    s/p lysis     Abdominal adhesions      Acne rosacea      Allergic rhinitis      Allergic rhinitis, cause unspecified      Alopecia      Anemia      CAD (coronary artery disease)      Carotid stenosis      CKD (chronic kidney disease) stage 2, GFR 60-89 ml/min      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)      CPAP (continuous positive airway pressure) dependence      Depression      Diabetic gastroparesis (H)      Diet-controlled type 2 diabetes mellitus (H)      DM2 (diabetes mellitus, type 2) (H)      DVT (deep venous thrombosis) (H)      DVT of axillary vein, acute right (H)      Fibromyalgia      Gastro-oesophageal reflux disease      GERD (gastroesophageal reflux disease)      Hernia of unspecified site of abdominal cavity without mention of obstruction or gangrene     bilateral     History of blood transfusion     10/ 1980     HTN (hypertension)      HTN (hypertension)      Hypertriglyceridemia      Hypertriglyceridemia      Hypokalemia      Hypothyroidism      Obstructive sleep apnea      ANNETTE (obstructive sleep apnea)      ANNETTE on CPAP      Osteoarthritis of glenohumeral joint      Papillary  carcinoma of thyroid (H)     s/p thyroidectomy - Ruegemer     Papillary carcinoma of thyroid (H)      PE (pulmonary embolism)      Poliomyelitis     poor circulation right leg     Poliomyelitis      Postsurgical hypothyroidism     s/p papillary thryoid cancer - Ruegemer     Pulmonary embolism (H)      Pulmonary embolus (H)      Pulmonary nodule      Rosacea      S/P carpal tunnel release     bilateral     S/P hardware removal 01/2014    still with lingering foot pain     S/P shoulder surgery     bilateral     Septic joint (H)     right knee     Septic joint of right knee joint (H)      Venous insufficiency      Venous insufficiency      Venous thrombosis 1999    right axillary vein       Past Surgical History:   Procedure Laterality Date     AMPUTATE TOE(S)  3/15/2012    Procedure:AMPUTATE TOE(S); Surgeon:RUBEN MOSES; Location: OR     AMPUTATE TOE(S)       APPENDECTOMY  1972     APPENDECTOMY       ARTHRODESIS FOOT  3/15/2012    Procedure:ARTHRODESIS FOOT; RIGHT TRIPLE ARTHRODESIS, FIFTH TOE AMPUTATION, LATERAL LIGAMENT RECONSTRUCTION, TENDON TRANSFER AND RELEASE [MINI C-ARM, ARTHREX 4.5 AND 6.7 STAPLES, BIOCOMPOSITE TENODESIS SCREWS]; Surgeon:RUBEN MOSES; Location: OR     ARTHRODESIS FOOT Right     Right foot triple arthrodesis and removal of hardware     ARTHROSCOPY SHOULDER  06/25/2015    REVISION SUBACROMIAL DECOMPRESSION, EXCISION OF GANGLION CYST, DEBRIDEMENT AND EXCISION OF THE GLENOHUMERAL JOINT GANGLION CYST, CORACOID DECOMPRESSION, POSSIBLE SUBSCAPULARIS REPAIR AND OPEN SUBSCAPULARIS BICEP     ARTHROSCOPY SHOULDER ROTATOR CUFF REPAIR       BIOPSY  Thyroid 2002     BREAST SURGERY  Biopsy     CHOLECYSTECTOMY       CHOLECYSTECTOMY       COLONOSCOPY  2018     COLOSTOMY  2/7/2012    Procedure:COLOSTOMY; CREATION OF SIGMOID COLOSTOMY AND EXTENSIVE  LYSIS OF ADHESIONS; Surgeon:MONTSERRAT BENDER; Location: OR     COLOSTOMY       EYE SURGERY       GENITOURINARY SURGERY  1999     GI  SURGERY      weakened rectal sphincter with artificial stimulator     HERNIA REPAIR  1976     HYSTERECTOMY TOTAL ABDOMINAL       LAPAROTOMY, LYSIS ADHESIONS, COMBINED  2/7/2012    Procedure:COMBINED LAPAROTOMY, LYSIS ADHESIONS; Surgeon:MONTSERRAT BENDER; Location:[facility] OR     RELEASE CARPAL TUNNEL       RELEASE TENDON FOOT  3/15/2012    Procedure:RELEASE TENDON FOOT; Surgeon:SUKHDEEP METZ; Location:[facility] OR     REMOVE HARDWARE FOOT  12/13/2012    Procedure: REMOVE HARDWARE FOOT;  RIGHT FOOT REMOVAL OF HARDWARE;  Surgeon: Sukhdeep Metz MD;  Location: [facility] OR     SHOULDER SURGERY  11/12/2020    LEFT SHOULDER HEMIARHTROPLASTY, BICEP TENODESIS     SOFT TISSUE SURGERY  2018, 2020     TENDON RELEASE      foot     THYROIDECTOMY       ZZC FREEING BOWEL ADHESION,ENTEROLYSIS      1986, 1996, 1999     ZZC NONSPECIFIC PROCEDURE      throidectomy     ZZC TOTAL ABDOM HYSTERECTOMY  1980    + BSO       Allergies:  Nsaids, Toradol, Celecoxib, Codeine, Crestor [rosuvastatin], No clinical screening - see comments, Vioxx, Conjugated estrogens, and Sulfa drugs    Social History     Tobacco Use     Smoking status: Never     Smokeless tobacco: Never   Substance Use Topics     Alcohol use: Never       Family History   Problem Relation Age of Onset     Arthritis Mother      Hypertension Mother      Cerebrovascular Disease Mother      Obesity Mother      Heart Disease Mother         MI's     Hypertension Father      Respiratory Father         Adult RDS     Diabetes Father      Arthritis Sister      Cancer Sister      Diabetes Sister      Hypertension Sister      Breast Cancer Sister      Depression Sister      Thyroid Disease Sister      Obesity Sister      Arthritis Sister      Hypertension Sister      Thyroid Disease Sister      Osteoporosis Sister      Obesity Sister      Cancer Sister         colon polup     Hypertension Sister      Osteoporosis Sister      Obesity Sister      Colon Cancer Sister      Lipids Sister       Obesity Sister      Obesity Maternal Grandmother         Dad s mother     Skin Cancer Maternal Grandmother         skin cancer unknown     Cancer Maternal Grandmother         unknown skin cancer on face     Obesity Paternal Grandmother         Mothers mother     Heart Disease Brother         MI at 54     Other Cancer Brother         Lung & bone     Lipids Brother      Hypertension Brother      Diabetes Brother      Hyperlipidemia Brother      Ovarian Cancer No family hx of           REVIEW OF SYSTEMS:   ROS: 10 point ROS neg other than the symptoms noted above in the HPI.      EXAM:  Vitals: BP (!) 171/67 (BP Location: Right arm, Patient Position: Sitting, Cuff Size: Adult Regular)   Pulse 61   Wt 97.5 kg (215 lb)   SpO2 100%   BMI 41.99 kg/m      BMIE= Body mass index is 41.99 kg/m .  Exam:  Constitutional: healthy, alert, no acute distress, elderly female,   Head: Normocephalic. No masses, lesions, no exophthalmos/proptosis  ENT: no palpable thyroid, L side mobile LN palpable, firm  Respiratory: nonlabored, CTAB  Gastrointestinal: Abdomen soft, non-tender.  Musculoskeletal: extremities normal- no gross deformities noted, gait normal and normal muscle tone  Skin: no suspicious lesions or rashes  Neurologic: Gait slow with walker. sensation grossly intact  Psychiatric: mentation appears normal, calm    Neck Ultrasound 8/2022:  FINDINGS: Postoperative changes of thyroidectomy are noted. No  evidence for a recurrent soft tissue lesion in the thyroidectomy bed.  No worrisome lesions are identified in the neck. Ultrasound scanning  through the region of the left neck palpable abnormality demonstrates  a normal-appearing left submandibular gland. Incidentally noted  atherosclerotic plaque is noted in the left internal carotid artery.                                                                      IMPRESSION:    1. No convincing evidence for recurrent malignancy in the neck.   2. The left neck palpable  abnormality appears to correspond to a  normal left submandibular gland. If there is continued clinical  concern for a suspicious neck mass, consider contrast-enhanced CT or  MRI of the neck for further evaluation.      ASSESSMENT/PLAN:  No diagnosis found.  No orders of the defined types were placed in this encounter.      1) Diabetes Mellitus   - Glucose Control- NOT at goal,   - DECREASE Tresiba 25u every morning, if AM BG 90 or less, take 12u   - INCREASE Ozepmic 1mg weekly.    - New RX for Freestyle daryn 3  - Cardiovascular Risk- continue ezetimibe, fenofibrate,   - Ophthalmology- uptodate, instructed to return Nov 2022, no NPDR.  - Podiatry- patient instructed on routine foot care, follows with podiatry  - Renal- Uptodate, SIOBHAN now continue lisinopril    2) Cardiovascular Risk-   - Continue ezetimibe, fenofibrate.   - Start icosapent ethyl 1g BID, triglycerides improved  - Continue aspirin 81mg daily     3) Hypothyroidism, postsurgical  - TSH/FT4 at goal  - Continue LT4 112mcg     4) History of Papillary Thyroid Cancer  - U/S and TG/TGab show biochemically and structurally complete response  - Now 20 years out  - enlarged LN, recheck ultrasound now.    RTC- 3 months    A total of 32 minutes were spent today 11/30/22 on this visit including chart review, history and counseling, documentation and other activities as detailed above.     Answers for HPI/ROS submitted by the patient on 11/29/2022  General Symptoms: Yes  Skin Symptoms: Yes  HENT Symptoms: Yes  EYE SYMPTOMS: No  HEART SYMPTOMS: Yes  LUNG SYMPTOMS: Yes  INTESTINAL SYMPTOMS: Yes  URINARY SYMPTOMS: Yes  GYNECOLOGIC SYMPTOMS: No  BREAST SYMPTOMS: No  SKELETAL SYMPTOMS: Yes  BLOOD SYMPTOMS: Yes  NERVOUS SYSTEM SYMPTOMS: Yes  MENTAL HEALTH SYMPTOMS: No  Ear pain: No  Ear discharge: No  Hearing loss: No  Tinnitus: Yes  Nosebleeds: No  Congestion: Yes  Sinus pain: No  Trouble swallowing: No   Voice hoarseness: Yes  Mouth sores: Yes  Sore throat: Yes  Tooth  pain: No  Gum tenderness: No  Bleeding gums: No  Change in taste: No  Change in sense of smell: No  Dry mouth: No  Hearing aid used: No  Neck lump: Yes  Fever: Yes  Loss of appetite: No  Weight loss: No  Weight gain: Yes  Fatigue: Yes  Night sweats: Yes  Chills: Yes  Increased stress: No  Excessive hunger: No  Excessive thirst: No  Feeling hot or cold when others believe the temperature is normal: No  Loss of height: No  Post-operative complications: No  Surgical site pain: No  Hallucinations: No  Change in or Loss of Energy: Yes  Hyperactivity: No  Confusion: No  Changes in hair: No  Changes in moles/birth marks: No  Itching: No  Rashes: Yes  Changes in nails: Yes  Acne: No  Hair in places you don't want it: No  Change in facial hair: No  Warts: No  Non-healing sores: No  Scarring: Yes  Flaking of skin: Yes  Color changes of hands/feet in cold : Yes  Sun sensitivity: No  Skin thickening: No  Chest pain or pressure: Yes  Fast or irregular heartbeat: No  Pain in legs with walking: Yes  Trouble breathing while lying down: Yes  Fingers or toes appear blue: Yes  High blood pressure: Yes  Low blood pressure: No  Fainting: No  Murmurs: Yes  Pacemaker: No  Varicose veins: No  Wake up at night with shortness of breath: No  Light-headedness: Yes  Exercise intolerance: Yes  Cough: Yes  Sputum or phlegm: No  Coughing up blood: No  Difficulty breating or shortness of breath: Yes  Snoring: No  Wheezing: No  Difficulty breathing on exertion: No  Nighttime Cough: Yes  Difficulty breathing when lying flat: Yes  Heart burn or indigestion: Yes  Nausea: Yes  Vomiting: No  Abdominal pain: Yes  Bloating: Yes  Constipation: No  Diarrhea: Yes  Blood in stool: No  Black stools: No  Rectal or Anal pain: Yes  Fecal incontinence: No  Yellowing of skin or eyes: No  Vomit with blood: No  Change in stools: No  Trouble holding urine or incontinence: Yes  Pain or burning: No  Trouble starting or stopping: Yes  Increased frequency of urination:  No  Blood in urine: No  Decreased frequency of urination: No  Frequent nighttime urination: No  Flank pain: No  Difficulty emptying bladder: No  Back pain: Yes  Muscle aches: Yes  Neck pain: Yes  Swollen joints: No  Joint pain: No  Bone pain: No  Muscle cramps: No  Muscle weakness: Yes  Joint stiffness: No  Bone fracture: No  Edema or swelling: Yes  Anemia: No  Swollen glands: Yes  Easy bleeding or bruising: No  Trouble with coordination: Yes  Dizziness or trouble with balance: Yes  Fainting or black-out spells: No  Memory loss: No  Headache: Yes  Seizures: No  Speech problems: No  Tingling: No  Tremor: No  Weakness: Yes  Difficulty walking: Yes  Paralysis: No  Numbness: No

## 2022-11-30 NOTE — LETTER
11/30/2022         RE: Chasity Hodgson  [address]  [facility] [address]        Dear Colleague,    Thank you for referring your patient, Chasity Hodgson, to the [facility] [place]. Please see a copy of my visit note below.    Chasity Hodgson is a 76 year old yo female here for follow-up of Diabetes Mellitus, and PTC/post surgical hypothyrodisim. She also has past medical istory of ANNETTE, gastroparesis, obesity,  Last seen by me August 2022    INTERVAL HISTORY:  - Had COVID infection, recovered  -  currently hospitalized at [facility] with sepsis  - Notes declining BG and if wakes up at 120, will need to eat or will go low  - Started ozempic, no trouble with nausea/worsening fullness after eating  - BG MUCH improved compared to previous  - stopped Glipizide after last visit      1) Diabetes Mellitus    Diabetes History:  Diagnosis: 2015, picked up on routine labs  Hospitalizations: None  Previous Regimens: Farxia (difficulty with frequent urination), Toujeo (was working well but insurance formulary changed 1/1/2021) At highest was at 48u daily, and then had profound low. At time of discontinuing, was down to 5u of toujeo. Previously on metformin.   Current Regimen: Tresiba 32u daily, Ozempic 0.5mg weekly    BG check- Freestyle Vini 14 day  Trends-   Overall high-- ave 163 (lower than August at 193, and May 224)                Complications: Gastroparesis- previously on reglan, overall not impactful    Last eye exam: Nov 22, 2021- no retinopathy, has cataracts  Foot Exam: [facility], checks every 60 days   Blood pressure- Lisinopril 40mg daily, Atenolol 50mg daily  Lipids- ezetimibe 10 mg daily, fenofibrate 145mg daily  SIOBHAN last 7/7- 25.52    2) Hypothyroidism s/p thyroidectomy for PTC  Diagnosis: known 3 nodules that were being monitored, and in 2002 s/p thyroidectomy (nodule in isthmus was cancer), s/p BUTLER (per patient, unclear if clean margins so received BUTLER, unknown  dose)  Treatment: Levothyroxine 112mcg  Residual tissue on R that has been monitored, not growing.   Last ultrasound 10/2021- no tissue seen  last biomarkers 10/7/2021- TG <0.1, TGAb <0.4      INTERVAL HISTORY-- has felt enlarging L side of her neck, ? Lymph node  Stable for 4-5 months     3) Vitamin D Deficiency  - Taking 2 tab daily  - Most recent 7/7/2021- 73  - PTH 9 (9/16/2021)--> recheck 21 10/21/2022    4) Hypertriglyceridemia  - On ezetimibe and fenofibrate, tried statin before with myalgias  - Eats minimal processed foods, minimal fried foods/baked treats    BP Readings from Last 3 Encounters:   11/30/22 (!) 171/67   10/12/22 130/72   09/08/22 (!) 147/66       Lab Results   Component Value Date    A1C 9.6 01/11/2022    A1C 9.5 10/07/2021    A1C 9.0 08/02/2021    A1C 8.7 07/07/2021    A1C 6.2 11/27/2020    A1C 6.8 09/10/2019       Recent Labs   Lab Test 01/11/22  1359 03/29/19  1355 08/01/17  0000 02/15/17  0000 02/11/16  0000 03/31/15  1327   CHOL 177 196   < > 142   < > 158   HDL 34* 27*   < > 31   < > 34*   LDL 81 99   < > 49   < > 51   TRIG 308* 349*   < > 309   < > 365*   CHOLHDLRATIO  --   --   --  4.6  --  4.6    < > = values in this interval not displayed.       Lab Results   Component Value Date    MICROL 36 01/11/2022    MICROL 23 07/07/2021     No results found for: MICROALBUMIN        Wt Readings from Last 3 Encounters:   11/30/22 97.5 kg (215 lb)   10/12/22 96.2 kg (212 lb)   09/08/22 95.7 kg (211 lb)       Current Outpatient Medications   Medication Sig Dispense Refill     amLODIPine (NORVASC) 2.5 MG tablet Take 2.5mg in AM, 2.5mg at noon, and 5mg at bedtime 360 tablet 3     aspirin (ASA) 81 MG EC tablet Take 81 mg by mouth daily       atenolol (TENORMIN) 50 MG tablet TAKE ONE TABLET BY MOUTH TWICE A  tablet 3     azelastine (ASTEPRO) 0.15 % nasal spray 1 spray       Cholecalciferol (VITAMIN D-3 PO) Take 2 tablets by mouth       clotrimazole (LOTRIMIN) 1 % cream Apply topically daily        Continuous Blood Gluc Sensor (FREESTYLE BRNADY 14 DAY SENSOR) [facility] Apply 1 sensor and change every 14 days. 2 each 11     diphenhydrAMINE-acetaminophen (TYLENOL PM)  MG tablet Take 1 tablet by mouth At Bedtime Reported on 3/20/2017       ezetimibe (ZETIA) 10 MG tablet Take 1 tablet (10 mg) by mouth daily 90 tablet 3     famotidine (PEPCID) 20 MG tablet Take 2 tablets (40 mg) by mouth as needed 180 tablet 3     fenofibrate (TRICOR) 145 MG tablet Take 1 tablet (145 mg) by mouth daily 90 tablet 3     ferrous sulfate (IRON) 325 (65 FE) MG tablet Take 325 mg by mouth daily (with breakfast)       fexofenadine (ALLEGRA) 180 MG tablet Take 180 mg by mouth daily. 120 0     fluocinolone acetonide (DERMA SMOOTHE/FS BODY) 0.01 % external oil Apply 2 mL to scalp once per week. Massage into scalp. Can be left in overnight or washed out after 4-6 hours. 118.28 mL 5     fluticasone (FLONASE) 50 MCG/ACT spray Spray 2 sprays in nostril daily 2 sprays in each nostril qd 1 Bottle 0     furosemide (LASIX) 20 MG tablet Take 1 tablet (20 mg) by mouth daily as needed (lower extremity edema) 90 tablet 3     Icosapent Ethyl 1 g CAPS Take 1 g by mouth 2 times daily 60 capsule 11     insulin pen needle (B-D U/F) 31G X 5 MM miscellaneous Use 1 pen needles daily or as directed. 90 each 3     lisinopril (ZESTRIL) 40 MG tablet Take 1 tablet (40 mg) by mouth daily 90 tablet 3     MULTIVITAMINS OR TABS ONE DAILY 100 3     nitroGLYcerin (NITROSTAT) 0.4 MG sublingual tablet Place 1 tablet (0.4 mg) under the tongue every 5 minutes as needed for chest pain (no more than 3 in one hour; after 3rd, call 911.) 25 tablet 3     nystatin (MYCOSTATIN) cream Apply topically daily as needed        nystatin-triamcinolone (MYCOLOG II) cream Apply topically daily as needed        ondansetron (ZOFRAN) 4 MG tablet Take 1 tablet by mouth every 6 hours as needed Reported on 3/20/2017       order for DME Equipment being ordered: Compression stockings - Knee  High; 20-30 mmHg compression - note would like adhesive band to keep the stocking from sliding down 3 each 0     order for DME Equipment being ordered: Oral appliance for sleep apnea 1 Units 0     pantoprazole (PROTONIX) 40 MG EC tablet Take 40 mg by mouth 2 times daily       pregabalin (LYRICA) 50 MG capsule Take 1 capsule (50 mg) by mouth 2 times daily 60 capsule 2     semaglutide (OZEMPIC) 2 MG/1.5ML SOPN pen Inject 0.25 mg subcutaneous weekly x 4 weeks, then 0.5 mg weekly 3 mL 1     SYNTHROID 112 MCG tablet Take 1 tablet (112 mcg) by mouth daily Take 112 mcg daily except for Friday takes only 56 mcg.  Brand name Synthroid 90 tablet 3     TRESIBA FLEXTOUCH 100 UNIT/ML pen Inject 32 Units Subcutaneous every morning 5 mL 11     tretinoin (RETIN-A) 0.025 % external cream Use every night as tolerated - spot treat lesion 20 g 3     glipiZIDE (GLUCOTROL XL) 5 MG 24 hr tablet Take 1 tablet (5 mg) by mouth daily (Patient not taking: Reported on 11/16/2022) 90 tablet 3       Histories reviewed and updated in Epic.  Past Medical History:   Diagnosis Date     Abdominal adhesions 1984, 96,99    s/p lysis     Abdominal adhesions      Acne rosacea      Allergic rhinitis      Allergic rhinitis, cause unspecified      Alopecia      Anemia      CAD (coronary artery disease)      Carotid stenosis      CKD (chronic kidney disease) stage 2, GFR 60-89 ml/min      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)      CPAP (continuous positive airway pressure) dependence      Depression      Diabetic gastroparesis (H)      Diet-controlled type 2 diabetes mellitus (H)      DM2 (diabetes mellitus, type 2) (H)      DVT (deep venous thrombosis) (H)      DVT of axillary vein, acute right (H)      Fibromyalgia      Gastro-oesophageal reflux disease      GERD (gastroesophageal reflux disease)      Hernia of unspecified site of abdominal cavity without mention of obstruction or gangrene     bilateral     History of blood transfusion     10/ 1980     HTN (hypertension)       HTN (hypertension)      Hypertriglyceridemia      Hypertriglyceridemia      Hypokalemia      Hypothyroidism      Obstructive sleep apnea      ANNETTE (obstructive sleep apnea)      ANNETTE on CPAP      Osteoarthritis of glenohumeral joint      Papillary carcinoma of thyroid (H)     s/p thyroidectomy - Ruegemer     Papillary carcinoma of thyroid (H)      PE (pulmonary embolism)      Poliomyelitis     poor circulation right leg     Poliomyelitis      Postsurgical hypothyroidism     s/p papillary thryoid cancer - Ruegemer     Pulmonary embolism (H)      Pulmonary embolus (H)      Pulmonary nodule      Rosacea      S/P carpal tunnel release     bilateral     S/P hardware removal 01/2014    still with lingering foot pain     S/P shoulder surgery     bilateral     Septic joint (H)     right knee     Septic joint of right knee joint (H)      Venous insufficiency      Venous insufficiency      Venous thrombosis 1999    right axillary vein       Past Surgical History:   Procedure Laterality Date     AMPUTATE TOE(S)  3/15/2012    Procedure:AMPUTATE TOE(S); Surgeon:RUBEN MOSES; Location: OR     AMPUTATE TOE(S)       APPENDECTOMY  1972     APPENDECTOMY       ARTHRODESIS FOOT  3/15/2012    Procedure:ARTHRODESIS FOOT; RIGHT TRIPLE ARTHRODESIS, FIFTH TOE AMPUTATION, LATERAL LIGAMENT RECONSTRUCTION, TENDON TRANSFER AND RELEASE [MINI C-ARM, ARTHREX 4.5 AND 6.7 STAPLES, BIOCOMPOSITE TENODESIS SCREWS]; Surgeon:RUBEN MOSES; Location:[facility] OR     ARTHRODESIS FOOT Right     Right foot triple arthrodesis and removal of hardware     ARTHROSCOPY SHOULDER  06/25/2015    REVISION SUBACROMIAL DECOMPRESSION, EXCISION OF GANGLION CYST, DEBRIDEMENT AND EXCISION OF THE GLENOHUMERAL JOINT GANGLION CYST, CORACOID DECOMPRESSION, POSSIBLE SUBSCAPULARIS REPAIR AND OPEN SUBSCAPULARIS BICEP     ARTHROSCOPY SHOULDER ROTATOR CUFF REPAIR       BIOPSY  Thyroid 2002     BREAST SURGERY  Biopsy     CHOLECYSTECTOMY       CHOLECYSTECTOMY        COLONOSCOPY  2018     COLOSTOMY  2/7/2012    Procedure:COLOSTOMY; CREATION OF SIGMOID COLOSTOMY AND EXTENSIVE  LYSIS OF ADHESIONS; Surgeon:MONTSERRAT BENDER; Location:[facility] OR     COLOSTOMY       EYE SURGERY       GENITOURINARY SURGERY  1999     GI SURGERY      weakened rectal sphincter with artificial stimulator     HERNIA REPAIR  1976     HYSTERECTOMY TOTAL ABDOMINAL       LAPAROTOMY, LYSIS ADHESIONS, COMBINED  2/7/2012    Procedure:COMBINED LAPAROTOMY, LYSIS ADHESIONS; Surgeon:MONTSERRAT BENDER; Location:[facility] OR     RELEASE CARPAL TUNNEL       RELEASE TENDON FOOT  3/15/2012    Procedure:RELEASE TENDON FOOT; Surgeon:SUKHDEEP METZ; Location: OR     REMOVE HARDWARE FOOT  12/13/2012    Procedure: REMOVE HARDWARE FOOT;  RIGHT FOOT REMOVAL OF HARDWARE;  Surgeon: Sukhdeep Metz MD;  Location:  OR     SHOULDER SURGERY  11/12/2020    LEFT SHOULDER HEMIARHTROPLASTY, BICEP TENODESIS     SOFT TISSUE SURGERY  2018, 2020     TENDON RELEASE      foot     THYROIDECTOMY       ZZC FREEING BOWEL ADHESION,ENTEROLYSIS      1986, 1996, 1999     ZZC NONSPECIFIC PROCEDURE      throidectomy     ZZC TOTAL ABDOM HYSTERECTOMY  1980    + BSO       Allergies:  Nsaids, Toradol, Celecoxib, Codeine, Crestor [rosuvastatin], No clinical screening - see comments, Vioxx, Conjugated estrogens, and Sulfa drugs    Social History     Tobacco Use     Smoking status: Never     Smokeless tobacco: Never   Substance Use Topics     Alcohol use: Never       Family History   Problem Relation Age of Onset     Arthritis Mother      Hypertension Mother      Cerebrovascular Disease Mother      Obesity Mother      Heart Disease Mother         MI's     Hypertension Father      Respiratory Father         Adult RDS     Diabetes Father      Arthritis Sister      Cancer Sister      Diabetes Sister      Hypertension Sister      Breast Cancer Sister      Depression Sister      Thyroid Disease Sister      Obesity Sister      Arthritis Sister       Hypertension Sister      Thyroid Disease Sister      Osteoporosis Sister      Obesity Sister      Cancer Sister         colon polup     Hypertension Sister      Osteoporosis Sister      Obesity Sister      Colon Cancer Sister      Lipids Sister      Obesity Sister      Obesity Maternal Grandmother         Dad s mother     Skin Cancer Maternal Grandmother         skin cancer unknown     Cancer Maternal Grandmother         unknown skin cancer on face     Obesity Paternal Grandmother         Mothers mother     Heart Disease Brother         MI at 54     Other Cancer Brother         Lung & bone     Lipids Brother      Hypertension Brother      Diabetes Brother      Hyperlipidemia Brother      Ovarian Cancer No family hx of           REVIEW OF SYSTEMS:   ROS: 10 point ROS neg other than the symptoms noted above in the HPI.      EXAM:  Vitals: BP (!) 171/67 (BP Location: Right arm, Patient Position: Sitting, Cuff Size: Adult Regular)   Pulse 61   Wt 97.5 kg (215 lb)   SpO2 100%   BMI 41.99 kg/m      BMIE= Body mass index is 41.99 kg/m .  Exam:  Constitutional: healthy, alert, no acute distress, elderly female,   Head: Normocephalic. No masses, lesions, no exophthalmos/proptosis  ENT: no palpable thyroid, L side mobile LN palpable, firm  Respiratory: nonlabored, CTAB  Gastrointestinal: Abdomen soft, non-tender.  Musculoskeletal: extremities normal- no gross deformities noted, gait normal and normal muscle tone  Skin: no suspicious lesions or rashes  Neurologic: Gait slow with walker. sensation grossly intact  Psychiatric: mentation appears normal, calm    Neck Ultrasound 8/2022:  FINDINGS: Postoperative changes of thyroidectomy are noted. No  evidence for a recurrent soft tissue lesion in the thyroidectomy bed.  No worrisome lesions are identified in the neck. Ultrasound scanning  through the region of the left neck palpable abnormality demonstrates  a normal-appearing left submandibular gland. Incidentally  noted  atherosclerotic plaque is noted in the left internal carotid artery.                                                                      IMPRESSION:    1. No convincing evidence for recurrent malignancy in the neck.   2. The left neck palpable abnormality appears to correspond to a  normal left submandibular gland. If there is continued clinical  concern for a suspicious neck mass, consider contrast-enhanced CT or  MRI of the neck for further evaluation.      ASSESSMENT/PLAN:  No diagnosis found.  No orders of the defined types were placed in this encounter.      1) Diabetes Mellitus   - Glucose Control- NOT at goal,   - DECREASE Tresiba 25u every morning, if AM BG 90 or less, take 12u   - INCREASE Ozepmic 1mg weekly.    - New RX for Freestyle daryn 3  - Cardiovascular Risk- continue ezetimibe, fenofibrate,   - Ophthalmology- uptodate, instructed to return Nov 2022, no NPDR.  - Podiatry- patient instructed on routine foot care, follows with podiatry  - Renal- Uptodate, SIOBHAN now continue lisinopril    2) Cardiovascular Risk-   - Continue ezetimibe, fenofibrate.   - Start icosapent ethyl 1g BID, triglycerides improved  - Continue aspirin 81mg daily     3) Hypothyroidism, postsurgical  - TSH/FT4 at goal  - Continue LT4 112mcg     4) History of Papillary Thyroid Cancer  - U/S and TG/TGab show biochemically and structurally complete response  - Now 20 years out  - enlarged LN, recheck ultrasound now.    RTC- 3 months    A total of 32 minutes were spent today 11/30/22 on this visit including chart review, history and counseling, documentation and other activities as detailed above.     Answers for HPI/ROS submitted by the patient on 11/29/2022  General Symptoms: Yes  Skin Symptoms: Yes  HENT Symptoms: Yes  EYE SYMPTOMS: No  HEART SYMPTOMS: Yes  LUNG SYMPTOMS: Yes  INTESTINAL SYMPTOMS: Yes  URINARY SYMPTOMS: Yes  GYNECOLOGIC SYMPTOMS: No  BREAST SYMPTOMS: No  SKELETAL SYMPTOMS: Yes  BLOOD SYMPTOMS: Yes  NERVOUS  SYSTEM SYMPTOMS: Yes  MENTAL HEALTH SYMPTOMS: No  Ear pain: No  Ear discharge: No  Hearing loss: No  Tinnitus: Yes  Nosebleeds: No  Congestion: Yes  Sinus pain: No  Trouble swallowing: No   Voice hoarseness: Yes  Mouth sores: Yes  Sore throat: Yes  Tooth pain: No  Gum tenderness: No  Bleeding gums: No  Change in taste: No  Change in sense of smell: No  Dry mouth: No  Hearing aid used: No  Neck lump: Yes  Fever: Yes  Loss of appetite: No  Weight loss: No  Weight gain: Yes  Fatigue: Yes  Night sweats: Yes  Chills: Yes  Increased stress: No  Excessive hunger: No  Excessive thirst: No  Feeling hot or cold when others believe the temperature is normal: No  Loss of height: No  Post-operative complications: No  Surgical site pain: No  Hallucinations: No  Change in or Loss of Energy: Yes  Hyperactivity: No  Confusion: No  Changes in hair: No  Changes in moles/birth marks: No  Itching: No  Rashes: Yes  Changes in nails: Yes  Acne: No  Hair in places you don't want it: No  Change in facial hair: No  Warts: No  Non-healing sores: No  Scarring: Yes  Flaking of skin: Yes  Color changes of hands/feet in cold : Yes  Sun sensitivity: No  Skin thickening: No  Chest pain or pressure: Yes  Fast or irregular heartbeat: No  Pain in legs with walking: Yes  Trouble breathing while lying down: Yes  Fingers or toes appear blue: Yes  High blood pressure: Yes  Low blood pressure: No  Fainting: No  Murmurs: Yes  Pacemaker: No  Varicose veins: No  Wake up at night with shortness of breath: No  Light-headedness: Yes  Exercise intolerance: Yes  Cough: Yes  Sputum or phlegm: No  Coughing up blood: No  Difficulty breating or shortness of breath: Yes  Snoring: No  Wheezing: No  Difficulty breathing on exertion: No  Nighttime Cough: Yes  Difficulty breathing when lying flat: Yes  Heart burn or indigestion: Yes  Nausea: Yes  Vomiting: No  Abdominal pain: Yes  Bloating: Yes  Constipation: No  Diarrhea: Yes  Blood in stool: No  Black stools: No  Rectal  or Anal pain: Yes  Fecal incontinence: No  Yellowing of skin or eyes: No  Vomit with blood: No  Change in stools: No  Trouble holding urine or incontinence: Yes  Pain or burning: No  Trouble starting or stopping: Yes  Increased frequency of urination: No  Blood in urine: No  Decreased frequency of urination: No  Frequent nighttime urination: No  Flank pain: No  Difficulty emptying bladder: No  Back pain: Yes  Muscle aches: Yes  Neck pain: Yes  Swollen joints: No  Joint pain: No  Bone pain: No  Muscle cramps: No  Muscle weakness: Yes  Joint stiffness: No  Bone fracture: No  Edema or swelling: Yes  Anemia: No  Swollen glands: Yes  Easy bleeding or bruising: No  Trouble with coordination: Yes  Dizziness or trouble with balance: Yes  Fainting or black-out spells: No  Memory loss: No  Headache: Yes  Seizures: No  Speech problems: No  Tingling: No  Tremor: No  Weakness: Yes  Difficulty walking: Yes  Paralysis: No  Numbness: No          Again, thank you for allowing me to participate in the care of your patient.        Sincerely,        Odette Weston MD

## 2022-11-30 NOTE — PATIENT INSTRUCTIONS
1) Decrease Tresiba to 25u daily. If BG <90, take 12u  2) Increase Ozempic to 1mg weekly  3) Start Freestyle Vini 3

## 2022-12-01 ENCOUNTER — VIRTUAL VISIT (OUTPATIENT)
Dept: PALLIATIVE MEDICINE | Facility: CLINIC | Age: 77
End: 2022-12-01
Payer: MEDICARE

## 2022-12-01 DIAGNOSIS — M79.2 NEUROPATHIC PAIN: ICD-10-CM

## 2022-12-01 DIAGNOSIS — G89.4 CHRONIC PAIN SYNDROME: Primary | ICD-10-CM

## 2022-12-01 PROCEDURE — 99214 OFFICE O/P EST MOD 30 MIN: CPT | Mod: 95 | Performed by: ANESTHESIOLOGY

## 2022-12-01 RX ORDER — PREGABALIN 50 MG/1
50 CAPSULE ORAL 2 TIMES DAILY
Qty: 60 CAPSULE | Refills: 5 | Status: SHIPPED | OUTPATIENT
Start: 2022-12-01 | End: 2023-03-21

## 2022-12-01 ASSESSMENT — PAIN SCALES - GENERAL: PAINLEVEL: SEVERE PAIN (7)

## 2022-12-01 NOTE — NURSING NOTE
PEG Score 7/7/2022 9/1/2022 12/1/2022   PEG Total Score 4.67 4.33 7         Meenu Costa MA  [facility] [place]

## 2022-12-01 NOTE — PROGRESS NOTES
Sammie is a 77 year old who is being evaluated via a billable video visit.      How would you like to obtain your AVS? [employer]  If the video visit is dropped, the invitation should be resent by: Other e-mail: [employer]  Will anyone else be joining your video visit? No   Is Pt currently in [place]? Yes    NOTE:  If Pt is not in [place], Appointment needs to be canceled and rescheduled.    PREMA Cramer [facility]        Video-Visit Details  Video Start Time: 1:03 PM  Type of service:  Video Visit  Video End Time:1:35 PM  Originating Location (pt. Location): Home        Distant Location (provider location):  On-site  Platform used for Video Visit: Darrell      [facility]      Date of visit: 12/1/2022    Chief complaint:   Chief Complaint   Patient presents with     Pain       Interval history:  Chasity Hodgson is a 77 year old female last seen by me for INITIAL CONSULT on 6/2/2022. Last FOLLOW UP was on 9/1/2022 .        Since her last visit, Chasity Hodgson reports:    - Doing okay  - Recently had COVID, confirmed on 11/15/2022. She missed thanksgiving with her family.    - Her  has Parkinson's and lives in a NH.  He has been in the [facility] all week with a UTI and sepsis.  She is worried about him.  She visits him once a week normally.   - She is getting a lot of pain and aching in her left shoulder and bicept.    - She uses a heating pad and this helps.   - She tries to move around a lot when she is watching tv.  She continues to use the HEP they gave her when [facility] was at  [facility].   - The Lyrica has been helpful for her pain. She was able to increase this to 50mg BID since her last follow up with me. She has not had any side effects.   - She does not feel depressed.   - She tried Tizandine once and it was too sedating for her. She got this from [facility] Dr. Sousa.   -The patient describes severe aching pain in her lower back and left upper  side of back above her waist.  She also has extreme aching pain in her left knee and a bad ache in her left thigh, hip and groin area.  Her legs feel weaker.   - She sees a post-polio specialist and she thinks her leg pain is coming from her back.  She has narrowing in her spine.   - She is having some symptoms of vertigo but has not had any falls.   - A lot of walking makes her pain unbearable specifically trying to go upstairs.  Laying down with her legs elevated helps to relieve the pain as does sitting in her recliner.   - She was on gabapentin for many years and had side effects of sedation.  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   -She has been seeing a lot of different providers for her pain including a spine surgeon and orthopedic surgeon and physical medicine and rehab doctor.  They have treated her using things like physical therapy and injection therapy but have not managed her medications.   - Done seeing Sharon Cook DO with [facility].  PM&R physician.   - Dr. Sousa at [facility] spine.  Last follow up was 5/6/2022  - S/P left total hip replacement 9/14/2020 with Dr. Henderson at [facility]. She sees him for left hip and knee pain.  Appt on June 6th for cortisone injection left knee.   Had a left knee MRI 4/27/22  - Dr. Ferrera at [facility] for left shoulder pain.   - LEFT L3-4 transforaminal epidural steroid injection at [facility] May 12th, 2022. None of the injections she has had have been really helpful for her pain.  She has concerns about putting medications in her spine with her history of polio.   - Diagnosed with polio when she was 3 years old.        [place]  REVIEWED TODAY: YES   LYRICA 50MG #60 11/3/2022    MEDICATIONS FOR PAIN:   LYRICA 50MG BID  TYLENOL PRN     INJECTIONS/SURGERY:  She recently had a lumbar transforaminal epidural steroid injection done at [facility] on May 12 this was a left L3 4 injection.     IMAGING:  LUMBAR MRI  4/21/2022:        Medications:  Current Outpatient Medications   Medication Sig Dispense Refill     amLODIPine (NORVASC) 2.5 MG tablet Take 2.5mg in AM, 2.5mg at noon, and 5mg at bedtime 360 tablet 3     aspirin (ASA) 81 MG EC tablet Take 81 mg by mouth daily       atenolol (TENORMIN) 50 MG tablet TAKE ONE TABLET BY MOUTH TWICE A  tablet 3     azelastine (ASTEPRO) 0.15 % nasal spray 1 spray       Cholecalciferol (VITAMIN D-3 PO) Take 2 tablets by mouth       clotrimazole (LOTRIMIN) 1 % cream Apply topically daily       Continuous Blood Gluc Sensor (FREESTYLE BRANDY 3 SENSOR) MISC 1 each every 14 days 6 each 3     diphenhydrAMINE-acetaminophen (TYLENOL PM)  MG tablet Take 1 tablet by mouth At Bedtime Reported on 3/20/2017       ezetimibe (ZETIA) 10 MG tablet Take 1 tablet (10 mg) by mouth daily 90 tablet 3     famotidine (PEPCID) 20 MG tablet Take 2 tablets (40 mg) by mouth as needed 180 tablet 3     fenofibrate (TRICOR) 145 MG tablet Take 1 tablet (145 mg) by mouth daily 90 tablet 3     ferrous sulfate (IRON) 325 (65 FE) MG tablet Take 325 mg by mouth daily (with breakfast)       fexofenadine (ALLEGRA) 180 MG tablet Take 180 mg by mouth daily. 120 0     fluocinolone acetonide (DERMA SMOOTHE/FS BODY) 0.01 % external oil Apply 2 mL to scalp once per week. Massage into scalp. Can be left in overnight or washed out after 4-6 hours. 118.28 mL 5     fluticasone (FLONASE) 50 MCG/ACT spray Spray 2 sprays in nostril daily 2 sprays in each nostril qd 1 Bottle 0     furosemide (LASIX) 20 MG tablet Take 1 tablet (20 mg) by mouth daily as needed (lower extremity edema) 90 tablet 3     Icosapent Ethyl 1 g CAPS Take 1 g by mouth 2 times daily 60 capsule 11     insulin pen needle (B-D U/F) 31G X 5 MM miscellaneous Use 1 pen needles daily or as directed. 90 each 3     lisinopril (ZESTRIL) 40 MG tablet Take 1 tablet (40 mg) by mouth daily 90 tablet 3     MULTIVITAMINS OR TABS ONE DAILY 100 3     nitroGLYcerin (NITROSTAT)  0.4 MG sublingual tablet Place 1 tablet (0.4 mg) under the tongue every 5 minutes as needed for chest pain (no more than 3 in one hour; after 3rd, call 911.) 25 tablet 3     nystatin (MYCOSTATIN) cream Apply topically daily as needed        nystatin-triamcinolone (MYCOLOG II) cream Apply topically daily as needed        ondansetron (ZOFRAN) 4 MG tablet Take 1 tablet by mouth every 6 hours as needed Reported on 3/20/2017       order for DME Equipment being ordered: Compression stockings - Knee High; 20-30 mmHg compression - note would like adhesive band to keep the stocking from sliding down 3 each 0     order for DME Equipment being ordered: Oral appliance for sleep apnea 1 Units 0     pantoprazole (PROTONIX) 40 MG EC tablet Take 40 mg by mouth 2 times daily       pregabalin (LYRICA) 50 MG capsule Take 1 capsule (50 mg) by mouth 2 times daily 60 capsule 2     Semaglutide, 1 MG/DOSE, 4 MG/3ML SOPN Inject 1 mg Subcutaneous once a week 3 mL 11     SYNTHROID 112 MCG tablet Take 1 tablet (112 mcg) by mouth daily Take 112 mcg daily except for Friday takes only 56 mcg.  Brand name Synthroid 90 tablet 3     TRESIBA FLEXTOUCH 100 UNIT/ML pen Inject 25 Units Subcutaneous every morning 5 mL 11     tretinoin (RETIN-A) 0.025 % external cream Use every night as tolerated - spot treat lesion 20 g 3       Review of Systems:  ROS:  Constitutional, neuro, ENT, endocrine, pulmonary, cardiac, gastrointestinal, genitourinary, musculoskeletal, integument and psychiatric systems are negative, except as otherwise noted.    Physical Exam:  not currently breastfeeding.    GENERAL: Healthy, alert and no distress  EYES: Eyes grossly normal to inspection.  No discharge or erythema, or obvious scleral/conjunctival abnormalities.  RESP: No audible wheeze, cough, or visible cyanosis.  No visible retractions or increased work of breathing.    SKIN: Visible skin clear. No significant rash, abnormal pigmentation or lesions.  NEURO: Cranial nerves  grossly intact.  Mentation and speech appropriate for age.  PSYCH: Mentation appears normal, affect normal/bright, judgement and insight intact, normal speech and appearance well-groomed.       ASSESSMENT/PLAN:     1. Chronic pain syndrome  The patient has chronic widespread pain.  Her most significant pain is low back and leg pain related to her severe central stenosis and neurogenic claudication.  She is currently seeing a spine doctor and orthopedic doctor and a physical medicine and rehab doctor for this pain and surgery and injection therapy are being considered.  She is fond of these doctors and thinks they are doing a good job.  I am primarily managing her medications for pain.     I also discussed Cymbalta as another alternative for her.  She is stable currently and not feeling any depression so I let her know we could discuss this in the furture should things worsen.      She should continue to use her topical medications which are somewhat helpful for her. These include Salonpas patches and icy hot.  She is also using a heating pad.     She is having symptoms of vertigo but has not had any falls.  Discussed using her walker more often and being carefull to prevent falls.  Discussed dizzy and balance PT as an option for the future should this worsen.  Also discussed ENT referral as a possibility if things get much worse over time.         2. Neuropathic pain  CONTINUE Lyrica to 50mg BID      - pregabalin (LYRICA) 50 MG capsule; Take 1 capsule (50 mg) by mouth 2 times daily  Dispense: 60 capsule; Refill: 0       MEDICATIONS:   Orders Placed This Encounter   Medications     pregabalin (LYRICA) 50 MG capsule     Sig: Take 1 capsule (50 mg) by mouth 2 times daily     Dispense:  60 capsule     Refill:  5       FOLLOW UP:  6 months - MAY 24TH @ 2:30pm VIRTUAL VISIT    BILLING TIME DOCUMENTATION:   The total TIME spent on this patient on the date of the encounter/appointment was 44 minutes.      TOTAL TIME  includes:   Time spent preparing to see the patient (reviewing records and tests) - 4 min  Time spent face to face (or over the phone) with the patient - 32 min  Time spent ordering tests, medications, procedures and referrals - 1 min  Time spent Referring and communicating with other healthcare professionals - 0 min  Time spent documenting clinical information in Epic - 6 min      ANDREA ADDISON MD   Pain Management & Addiction Medicine

## 2022-12-05 ENCOUNTER — OFFICE VISIT (OUTPATIENT)
Dept: CARDIOLOGY | Facility: CLINIC | Age: 77
End: 2022-12-05
Payer: MEDICARE

## 2022-12-05 VITALS
OXYGEN SATURATION: 98 % | HEART RATE: 58 BPM | HEIGHT: 60 IN | BODY MASS INDEX: 42.01 KG/M2 | SYSTOLIC BLOOD PRESSURE: 122 MMHG | WEIGHT: 214 LBS | DIASTOLIC BLOOD PRESSURE: 70 MMHG

## 2022-12-05 DIAGNOSIS — R07.89 ATYPICAL CHEST PAIN: ICD-10-CM

## 2022-12-05 DIAGNOSIS — I25.10 CORONARY ARTERY DISEASE INVOLVING NATIVE CORONARY ARTERY OF NATIVE HEART WITHOUT ANGINA PECTORIS: Primary | ICD-10-CM

## 2022-12-05 PROCEDURE — 99214 OFFICE O/P EST MOD 30 MIN: CPT | Performed by: INTERNAL MEDICINE

## 2022-12-05 RX ORDER — NITROGLYCERIN 0.4 MG/1
0.4 TABLET SUBLINGUAL EVERY 5 MIN PRN
Qty: 25 TABLET | Refills: 3 | Status: SHIPPED | OUTPATIENT
Start: 2022-12-05 | End: 2024-01-24

## 2022-12-05 NOTE — PROGRESS NOTES
Cardiology Clinic          Assessment & Plan         1.  Nonocclusive coronary disease by angiogram 2018  2.  Chronic kidney disease  3.  Hypertension  4.  PAD  5.  Diabetes mellitus  6.  Hyperlipidemia - Statin intolerance   7.  Hypothyroidism  8.  History of DVT  9.  ANNETTE  10.  History of polio age to right foot brace due to deformity  11.  History of colorectal surgery currently has a colostomy, is in need of a minor procedure, outpatient  12.  Left hip surgery due to avascular necrosis at [facility].  Patient did well with no cardiovascular sequelae     Recommendations     1.  Atherosclerotic coronary artery disease : nonocclusive coronary artery disease by cardiac cath.  Stable symptoms.  We will continue with secondary prevention    2.  Hyperlipidemia: Has not been able to tolerate statins due to multiple allergies.  Continue with her current regimen of fenofibrate and zetia    3.  Return to clinic in 1 years time with pre clinic echo and labs    Ankit Bhatia MD         HPI:     Patient is a very pleasant 76-year-old female who has been followed by my colleague Dr. Ortiz for who has recently retired.  I last saw patient in 2021.   Her cardiac history is notable for nonocclusive coronary artery disease by coronary angiogram in 2018.  This was performed for pre-operative clearance for right shoulder surgery.  She underwent her surgery without difficulty.  Her other past medical history is as noted above.  She has long-standing history of hypertension and is on a multidrug regimen.  She also has chronic kidney disease.  She is in need of a minor colorectal procedure per patient.  This is an outpatient procedure that require some revision of her previous colostomy however through noninvasive measures.  Cardiac wise she is at baseline with no active symptoms.    Since my last visit she has mainly orthopedic issues.  She had avascular necrosis for which she underwent left hip surgery in May 2020.  She had a  uneventful hospital course and tells me that there was no mention of any cardiovascular sequelae during her procedure.  Her  unfortunately has had difficulty with his Parkinson's and has now moved into a nursing home in March 2020.  As a result [facility]  She was worried about maintaining a house by herself.  Otherwise is taking all of her medications and is here for her yearly visit.         Echo 2021    Mild valvular aortic stenosis.  The visual ejection fraction is 60-65%.  Left ventricular systolic function is normal.  There is trace aortic regurgitation.    Exam:    /70 (BP Location: Left arm, Cuff Size: Adult Large)   Pulse 58   Ht 1.524 m (5')   Wt 97.1 kg (214 lb)   SpO2 98%   BMI 41.79 kg/m    GENERAL: Healthy, alert and no distress  EYES: Eyes grossly normal to inspection.  No discharge or erythema, or obvious scleral/conjunctival abnormalities.  RESP: No audible wheeze, cough, or visible cyanosis.  No visible retractions or increased work of breathing.   CV:  RRR  Lungs: CTA   Abdomen: + BS soft NT  Ext: no edema   SKIN: Visible skin clear. No significant rash, abnormal pigmentation or lesions.  NEURO: Cranial nerves grossly intact.  Mentation and speech appropriate for age.  PSYCH: Mentation appears normal, affect normal/bright, judgement and insight intact, normal speech and appearance well-groomed.

## 2022-12-05 NOTE — LETTER
12/5/2022    Shola Benoit MD, MD  [address]  [facility] [address]    RE: Chasity Hodgson       Dear Colleague,     I had the pleasure of seeing Chasity Hodgson in the [facility].      Cardiology Clinic          Assessment & Plan         1.  Nonocclusive coronary disease by angiogram 2018  2.  Chronic kidney disease  3.  Hypertension  4.  PAD  5.  Diabetes mellitus  6.  Hyperlipidemia - Statin intolerance   7.  Hypothyroidism  8.  History of DVT  9.  ANNETTE  10.  History of polio age to right foot brace due to deformity  11.  History of colorectal surgery currently has a colostomy, is in need of a minor procedure, outpatient  12.  Left hip surgery due to avascular necrosis at [facility].  Patient did well with no cardiovascular sequelae     Recommendations     1.  Atherosclerotic coronary artery disease : nonocclusive coronary artery disease by cardiac cath.  Stable symptoms.  We will continue with secondary prevention    2.  Hyperlipidemia: Has not been able to tolerate statins due to multiple allergies.  Continue with her current regimen of fenofibrate and zetia    3.  Return to clinic in 1 years time with pre clinic echo and labs    Ankit Bhatia MD         HPI:     Patient is a very pleasant 76-year-old female who has been followed by my colleague Dr. Ortiz for who has recently retired.  I last saw patient in 2021.   Her cardiac history is notable for nonocclusive coronary artery disease by coronary angiogram in 2018.  This was performed for pre-operative clearance for right shoulder surgery.  She underwent her surgery without difficulty.  Her other past medical history is as noted above.  She has long-standing history of hypertension and is on a multidrug regimen.  She also has chronic kidney disease.  She is in need of a minor colorectal procedure per patient.  This is an outpatient procedure that require some revision of her previous colostomy however through noninvasive  measures.  Cardiac wise she is at baseline with no active symptoms.    Since my last visit she has mainly orthopedic issues.  She had avascular necrosis for which she underwent left hip surgery in May 2020.  She had a uneventful hospital course and tells me that there was no mention of any cardiovascular sequelae during her procedure.  Her  unfortunately has had difficulty with his Parkinson's and has now moved into a nursing home in March 2020.  As a result she has downsized and is now in a townhome to her relief.  She was worried about maintaining a house by herself.  Otherwise is taking all of her medications and is here for her yearly visit.         Echo 2021    Mild valvular aortic stenosis.  The visual ejection fraction is 60-65%.  Left ventricular systolic function is normal.  There is trace aortic regurgitation.    Exam:    /70 (BP Location: Left arm, Cuff Size: Adult Large)   Pulse 58   Ht 1.524 m (5')   Wt 97.1 kg (214 lb)   SpO2 98%   BMI 41.79 kg/m    GENERAL: Healthy, alert and no distress  EYES: Eyes grossly normal to inspection.  No discharge or erythema, or obvious scleral/conjunctival abnormalities.  RESP: No audible wheeze, cough, or visible cyanosis.  No visible retractions or increased work of breathing.   CV:  RRR  Lungs: CTA   Abdomen: + BS soft NT  Ext: no edema   SKIN: Visible skin clear. No significant rash, abnormal pigmentation or lesions.  NEURO: Cranial nerves grossly intact.  Mentation and speech appropriate for age.  PSYCH: Mentation appears normal, affect normal/bright, judgement and insight intact, normal speech and appearance well-groomed.  Thank you for allowing me to participate in the care of your patient.      Sincerely,     Ankit Bhatia MD     [facility] [place] [facility]  cc:   Ankit Bhatia MD  [address] [facility] [address] [place] [id number]

## 2022-12-06 ENCOUNTER — MYC MEDICAL ADVICE (OUTPATIENT)
Dept: FAMILY MEDICINE | Facility: CLINIC | Age: 77
End: 2022-12-06

## 2022-12-07 ENCOUNTER — LAB (OUTPATIENT)
Dept: LAB | Facility: CLINIC | Age: 77
End: 2022-12-07
Payer: MEDICARE

## 2022-12-07 ENCOUNTER — NURSE TRIAGE (OUTPATIENT)
Dept: FAMILY MEDICINE | Facility: CLINIC | Age: 77
End: 2022-12-07

## 2022-12-07 ENCOUNTER — HOSPITAL ENCOUNTER (EMERGENCY)
Facility: CLINIC | Age: 77
Discharge: HOME | End: 2022-12-07
Attending: EMERGENCY MEDICINE | Admitting: EMERGENCY MEDICINE
Payer: MEDICARE

## 2022-12-07 ENCOUNTER — APPOINTMENT (OUTPATIENT)
Dept: ULTRASOUND IMAGING | Facility: CLINIC | Age: 77
End: 2022-12-07
Attending: EMERGENCY MEDICINE
Payer: MEDICARE

## 2022-12-07 VITALS
SYSTOLIC BLOOD PRESSURE: 201 MMHG | RESPIRATION RATE: 16 BRPM | TEMPERATURE: 97.1 F | HEART RATE: 61 BPM | OXYGEN SATURATION: 100 % | DIASTOLIC BLOOD PRESSURE: 74 MMHG

## 2022-12-07 DIAGNOSIS — R60.0 BILATERAL LOWER EXTREMITY EDEMA: ICD-10-CM

## 2022-12-07 DIAGNOSIS — L03.116 CELLULITIS OF LEFT LEG: ICD-10-CM

## 2022-12-07 DIAGNOSIS — E11.9 TYPE 2 DIABETES, HBA1C GOAL < 7% (H): ICD-10-CM

## 2022-12-07 DIAGNOSIS — I10 HYPERTENSION, UNSPECIFIED TYPE: ICD-10-CM

## 2022-12-07 LAB
ANION GAP SERPL CALCULATED.3IONS-SCNC: 11 MMOL/L (ref 7–15)
BASOPHILS # BLD AUTO: 0 10E3/UL (ref 0–0.2)
BASOPHILS NFR BLD AUTO: 1 %
BUN SERPL-MCNC: 26 MG/DL (ref 8–23)
CALCIUM SERPL-MCNC: 9.2 MG/DL (ref 8.8–10.2)
CHLORIDE SERPL-SCNC: 105 MMOL/L (ref 98–107)
CREAT SERPL-MCNC: 1.3 MG/DL (ref 0.51–0.95)
DEPRECATED HCO3 PLAS-SCNC: 25 MMOL/L (ref 22–29)
EOSINOPHIL # BLD AUTO: 0.2 10E3/UL (ref 0–0.7)
EOSINOPHIL NFR BLD AUTO: 3 %
ERYTHROCYTE [DISTWIDTH] IN BLOOD BY AUTOMATED COUNT: 13 % (ref 10–15)
FASTING STATUS PATIENT QL REPORTED: YES
GFR SERPL CREATININE-BSD FRML MDRD: 42 ML/MIN/1.73M2
GLUCOSE SERPL-MCNC: 182 MG/DL (ref 70–99)
HBA1C MFR BLD: 7 % (ref 0–5.6)
HCT VFR BLD AUTO: 37.7 % (ref 35–47)
HGB BLD-MCNC: 12 G/DL (ref 11.7–15.7)
HOLD SPECIMEN: NORMAL
HOLD SPECIMEN: NORMAL
IMM GRANULOCYTES # BLD: 0.1 10E3/UL
IMM GRANULOCYTES NFR BLD: 1 %
LYMPHOCYTES # BLD AUTO: 2.2 10E3/UL (ref 0.8–5.3)
LYMPHOCYTES NFR BLD AUTO: 26 %
MCH RBC QN AUTO: 30 PG (ref 26.5–33)
MCHC RBC AUTO-ENTMCNC: 31.8 G/DL (ref 31.5–36.5)
MCV RBC AUTO: 94 FL (ref 78–100)
MONOCYTES # BLD AUTO: 0.6 10E3/UL (ref 0–1.3)
MONOCYTES NFR BLD AUTO: 7 %
NEUTROPHILS # BLD AUTO: 5.5 10E3/UL (ref 1.6–8.3)
NEUTROPHILS NFR BLD AUTO: 62 %
NRBC # BLD AUTO: 0 10E3/UL
NRBC BLD AUTO-RTO: 0 /100
NT-PROBNP SERPL-MCNC: 612 PG/ML (ref 0–1800)
PLATELET # BLD AUTO: 243 10E3/UL (ref 150–450)
POTASSIUM SERPL-SCNC: 4.5 MMOL/L (ref 3.4–5.3)
RBC # BLD AUTO: 4 10E6/UL (ref 3.8–5.2)
SODIUM SERPL-SCNC: 141 MMOL/L (ref 136–145)
TRIGL SERPL-MCNC: 194 MG/DL
WBC # BLD AUTO: 8.6 10E3/UL (ref 4–11)

## 2022-12-07 PROCEDURE — 36415 COLL VENOUS BLD VENIPUNCTURE: CPT | Performed by: EMERGENCY MEDICINE

## 2022-12-07 PROCEDURE — 93970 EXTREMITY STUDY: CPT

## 2022-12-07 PROCEDURE — 80048 BASIC METABOLIC PNL TOTAL CA: CPT | Performed by: EMERGENCY MEDICINE

## 2022-12-07 PROCEDURE — 85025 COMPLETE CBC W/AUTO DIFF WBC: CPT | Performed by: EMERGENCY MEDICINE

## 2022-12-07 PROCEDURE — 99285 EMERGENCY DEPT VISIT HI MDM: CPT | Mod: 25

## 2022-12-07 PROCEDURE — 83880 ASSAY OF NATRIURETIC PEPTIDE: CPT | Performed by: EMERGENCY MEDICINE

## 2022-12-07 PROCEDURE — 36415 COLL VENOUS BLD VENIPUNCTURE: CPT

## 2022-12-07 PROCEDURE — 84478 ASSAY OF TRIGLYCERIDES: CPT

## 2022-12-07 PROCEDURE — 250N000013 HC RX MED GY IP 250 OP 250 PS 637: Performed by: EMERGENCY MEDICINE

## 2022-12-07 PROCEDURE — 83036 HEMOGLOBIN GLYCOSYLATED A1C: CPT

## 2022-12-07 PROCEDURE — 93005 ELECTROCARDIOGRAM TRACING: CPT

## 2022-12-07 RX ORDER — CEPHALEXIN 500 MG/1
500 CAPSULE ORAL 4 TIMES DAILY
Qty: 28 CAPSULE | Refills: 0 | Status: SHIPPED | OUTPATIENT
Start: 2022-12-07 | End: 2022-12-14

## 2022-12-07 RX ORDER — DOXYCYCLINE 100 MG/1
100 CAPSULE ORAL 2 TIMES DAILY
Qty: 14 CAPSULE | Refills: 0 | Status: SHIPPED | OUTPATIENT
Start: 2022-12-07 | End: 2022-12-14

## 2022-12-07 RX ORDER — CEFTRIAXONE 2 G/1
2 INJECTION, POWDER, FOR SOLUTION INTRAMUSCULAR; INTRAVENOUS ONCE
Status: DISCONTINUED | OUTPATIENT
Start: 2022-12-07 | End: 2022-12-07

## 2022-12-07 RX ORDER — DOXYCYCLINE 100 MG/1
100 CAPSULE ORAL ONCE
Status: COMPLETED | OUTPATIENT
Start: 2022-12-07 | End: 2022-12-07

## 2022-12-07 RX ORDER — CEPHALEXIN 500 MG/1
500 CAPSULE ORAL ONCE
Status: COMPLETED | OUTPATIENT
Start: 2022-12-07 | End: 2022-12-07

## 2022-12-07 RX ADMIN — DOXYCYCLINE HYCLATE 100 MG: 100 CAPSULE ORAL at 19:29

## 2022-12-07 RX ADMIN — CEPHALEXIN 500 MG: 500 CAPSULE ORAL at 19:29

## 2022-12-07 ASSESSMENT — ACTIVITIES OF DAILY LIVING (ADL): ADLS_ACUITY_SCORE: 38

## 2022-12-07 NOTE — TELEPHONE ENCOUNTER
See triage encounter    Floresita ROSE, Triage RN  [facility]

## 2022-12-07 NOTE — TELEPHONE ENCOUNTER
Called to triage [employer] message     1-1.5 weeks ago noticed leg swelling bilaterally (left side is 1/4 inch bigger, has always been a little bigger due to history of Polio).      is in hospital with sepsis, will go back to nursing home today. Noticed she could hardly get shoes on and legs very swollen when she was going to visit     Elevating leg as she is able but has had to mostly be up and about     Wears an AFO on right side- strap can only get 1/4 onto velcro due to swelling     Lower left leg had cellulitis in past with flaking of skin/sores. The other night noticed scab broke open and drained. Wrapped with gauze pad. Now the heat is not so bad, by nighttime gets red and hot     Redness is now spreading to knee - before was 2 inches further down     Swelling extends just below knee on right side. Left goes to 2 inches below knee     Right leg on bottom above ankle is very red also - looks like starting of sores, also real hot to touch     Sores sting even without touching     Fever? Pt unsure but gets chilled in the evening as leg gets hot     Plans to send photos on Follica of legs     In about 45 mins has another appointment FYI but will keep phone on her     Nurse Triage SBAR    Is this a 2nd Level Triage? YES, LICENSED PRACTITIONER REVIEW IS REQUIRED  Protocol Recommended Disposition:   Go To ED/UCC Now (Or To Office With PCP Approval)    Routed to provider    Does the patient meet one of the following criteria for ADS visit consideration? 16+ years old, with an [facility] PCP     TIP  Providers, please consider if this condition is appropriate for management at one of our Acute and Diagnostic Services sites.     If patient is a good candidate, please use dotphrase <dot>triageresponse and select Refer to ADS to document.      Floresita ROSE, Triage RN  [facility] [place] [facility]       Swelling, sores and redness in left leg  12/6/2022 9:24 AM Reply    To: [facility]AGE [facility]      From: Sammie SU  Rema      Created: 12/6/2022 9:24 AM        *-*-*This message was handled on 12/6/2022 9:54 AM by BEN KWAN*-*-*    Gerard Benoit, I am having severe swelling in both of my legs that I can t get to go down. Have many sores on left leg that have scabs and now started to itch. Also the redness is moving up my le and on parts of my leg it is  at my knee level. It is very sore to touch. I do get times where one or two of the sores open and start to drain. Is there anything  I can do, so far nothing has helped. The lower part of the right leg also is very red, the right side of that leg is also very sore.           Reason for Disposition    SEVERE swelling (e.g., swelling extends above knee, entire leg is swollen, weeping fluid)    Additional Information    Negative: Sounds like a life-threatening emergency to the triager    Negative: Chest pain    Negative: Small area of swelling and followed an insect bite to the area    Negative: Followed a knee injury    Negative: Ankle or foot injury    Negative: Pregnant with leg swelling or edema    Negative: Difficulty breathing at rest    Negative: Entire foot is cool or blue in comparison to other side    Protocols used: LEG SWELLING AND EDEMA-A-OH

## 2022-12-07 NOTE — TELEPHONE ENCOUNTER
Writer called patient and relayed PCP's advice below.    Patient expressed verbal understanding and is agreeable, will go to ER now.    Signing encounter.     Shravan Pollard RN  [facility]

## 2022-12-08 LAB
ATRIAL RATE - MUSE: 60 BPM
DIASTOLIC BLOOD PRESSURE - MUSE: NORMAL MMHG
INTERPRETATION ECG - MUSE: NORMAL
P AXIS - MUSE: 31 DEGREES
PR INTERVAL - MUSE: 158 MS
QRS DURATION - MUSE: 92 MS
QT - MUSE: 394 MS
QTC - MUSE: 394 MS
R AXIS - MUSE: -2 DEGREES
SYSTOLIC BLOOD PRESSURE - MUSE: NORMAL MMHG
T AXIS - MUSE: 33 DEGREES
VENTRICULAR RATE- MUSE: 60 BPM

## 2022-12-08 NOTE — DISCHARGE INSTRUCTIONS
Discharge Instructions  Cellulitis    Cellulitis is an infection of the skin that occurs when bacteria enter the skin.   Symptoms are generally redness, swelling, warmth and pain.  Your infection appeared to be appropriate to treat at home with antibiotics.  However, sometimes your infection may be worse than it seemed at first, or may worsen with time. If you have new or worse symptoms, you may need to be seen again in the Emergency Department or by your primary provider.    Generally, every Emergency Department visit should have a follow-up clinic visit with either a primary or a specialty clinic/provider. Please follow-up as instructed by your emergency provider today.    Return to the Emergency Department if:  The redness, pain, or swelling gets a lot worse.  If the red area was marked, return if it is red significantly beyond the marked area.  You are unable to get your antibiotics, or are vomiting (throwing up) these pills, or you cannot take them.  You are feeling more ill, weak or lightheaded.  You start to run a new fever (temperature >101 F).  Anything else about the infection worries or concerns you.  Treatment:  Start your antibiotics right away, and take them as prescribed. Be sure to finish the whole prescription, even if you are better.  Apply a heating pad, warm packs, or warm water soaks to the infected area for 15 minutes at a time, at least 3 times a day. Do not use a heating pad on your feet or legs if you have diabetes. Do not sleep with a heating pad on, since this can cause burns or skin injury.  Rest your injured area for at least 1-2 days. After that you may start using your extremity again as long as there is not too much pain.   Raise the injured area above the level of your heart as much as possible in the first 1-2 days.  Tylenol  (acetaminophen), Motrin  (ibuprofen), or Advil  (ibuprofen) may help may help reduce pain and fever and may help you feel more comfortable. Be sure to read  and follow the package directions, and ask your provider if you have questions.    If you were given a prescription for medicine here today, be sure to read all of the information (including the package insert) that comes with your prescription.  This will include important information about the medicine, its side effects, and any warnings that you need to know about.  The pharmacist who fills the prescription can provide more information and answer questions you may have about the medicine.  If you have questions or concerns that the pharmacist cannot address, please call or return to the Emergency Department.     Remember that you can always come back to the Emergency Department if you are not able to see your regular provider in the amount of time listed above, if you get any new symptoms, or if there is anything that worries you.    Discharge Instructions  Hypertension - High Blood Pressure    During you visit to the Emergency Department, your blood pressure was higher than the recommended blood pressure.  This may be related to stress, pain, medication or other temporary conditions. In these cases, your blood pressure may return to normal on its own. If you have a history of high blood pressure, you may need to have your provider adjust your medications. Sometimes, your high measurement here may indicate that you have developed high blood pressure that will stay high unless it is treated. As a general rule, high blood pressure causes problems over years rather than days, weeks, or months. So, while it is important to treat blood pressure, it is rarely important to treat blood pressure immediately. Occasionally we will begin a medication in the Emergency Department; more often we will recommend close follow-up for medications with a primary doctor/clinic.    Generally, every Emergency Department visit should have a follow-up clinic visit with either a primary or a specialty clinic/provider. Please follow-up as  instructed by your emergency provider today.    Return to the Emergency Department if you start to have:  A severe headache.  Chest pain.  Shortness of breath.  Weakness or numbness that affects one part of the body.  Confusion.  Vision changes.  Significant swelling of legs and/or eyes.  A reaction to any medication started in the Emergency Department.    What can I do to help myself?  Avoid alcohol.  Take any blood pressure medicine that you are prescribed.  Get a good night s sleep.  Lower your salt intake.  Exercise.  Lose weight.  Manage stress.  See your doctor regularly    If blood pressure medication was started in the Emergency Department:  The medicine may not have an immediate effect. The body and brain determine what blood pressure you have. The medicine s job is to retrain the body s  thermostat  to a lower blood pressure.  You will need to follow up with your provider to see how this medicine is working for you.  If you were given a prescription for medicine here today, be sure to read all of the information (including the package insert) that comes with your prescription.  This will include important information about the medicine, its side effects, and any warnings that you need to know about.  The pharmacist who fills the prescription can provide more information and answer questions you may have about the medicine.  If you have questions or concerns that the pharmacist cannot address, please call or return to the Emergency Department.   Remember that you can always come back to the Emergency Department if you are not able to see your regular provider in the amount of time listed above, if you get any new symptoms, or if there is anything that worries you.

## 2022-12-08 NOTE — ED NOTES
Rapid Assessment Note    History:   Chasity Hodgson is a 77 year old female who presents with leg swelling bilaterally over the past few weeks. There is redness in the left leg which is new as well. It is also warm. There are sores in the left leg that drain. There are night chills. No SOB. H/o DVT in arm not on AC at present.     Exam:   General:  Alert, interactive  Cardiovascular:  Well perfused  Lungs:  No respiratory distress, no accessory muscle use  Neuro:  Moving all 4 extremities  Skin:  Warm, dry  Psych:  Normal affect    Plan of Care:   I evaluated the patient and developed an initial plan of care. I discussed this plan and explained that I, or one of my partners, would be returning to complete the evaluation.     12/7/2022  [employer]    Portions of this medical record were completed by a scribe. UPON MY REVIEW AND AUTHENTICATION BY ELECTRONIC SIGNATURE, this confirms (a) I performed the applicable clinical services, and (b) the record is accurate.        Akhil Cohn MD  12/07/22 5626

## 2022-12-08 NOTE — ED PROVIDER NOTES
History     Chief Complaint:  Leg Swelling       HPI   Chasity Hodgson is a 77 year old female who presents with leg swelling bilaterally over the past few weeks. There is redness in the left leg which is new as well. It is also warm. There are sores in the left leg that drain. There are night chills. No SOB or orthopnea. No CP. H/o DVT in arm not on AC at present.     ROS:  Review of Systems  A 10 point ROS was obtained and negative except as noted here and in HPI      Allergies:  Nsaids  Toradol  Celecoxib  Codeine  Crestor [Rosuvastatin]  No Clinical Screening - See Comments  Vioxx  Conjugated Estrogens  Sulfa Drugs     Medications:    cephALEXin (KEFLEX) 500 MG capsule  doxycycline hyclate (VIBRAMYCIN) 100 MG capsule  amLODIPine (NORVASC) 2.5 MG tablet  aspirin (ASA) 81 MG EC tablet  atenolol (TENORMIN) 50 MG tablet  azelastine (ASTEPRO) 0.15 % nasal spray  Cholecalciferol (VITAMIN D-3 PO)  clotrimazole (LOTRIMIN) 1 % cream  Continuous Blood Gluc Sensor (PathGroupYLE BRANDY 3 SENSOR) MISC  diphenhydrAMINE-acetaminophen (TYLENOL PM)  MG tablet  ezetimibe (ZETIA) 10 MG tablet  famotidine (PEPCID) 20 MG tablet  fenofibrate (TRICOR) 145 MG tablet  ferrous sulfate (IRON) 325 (65 FE) MG tablet  fexofenadine (ALLEGRA) 180 MG tablet  fluocinolone acetonide (DERMA SMOOTHE/FS BODY) 0.01 % external oil  fluticasone (FLONASE) 50 MCG/ACT spray  furosemide (LASIX) 20 MG tablet  Icosapent Ethyl 1 g CAPS  insulin pen needle (B-D U/F) 31G X 5 MM miscellaneous  lisinopril (ZESTRIL) 40 MG tablet  MULTIVITAMINS OR TABS  nitroGLYcerin (NITROSTAT) 0.4 MG sublingual tablet  nystatin (MYCOSTATIN) cream  nystatin-triamcinolone (MYCOLOG II) cream  ondansetron (ZOFRAN) 4 MG tablet  order for DME  order for DME  pantoprazole (PROTONIX) 40 MG EC tablet  pregabalin (LYRICA) 50 MG capsule  Semaglutide, 1 MG/DOSE, 4 MG/3ML SOPN  SYNTHROID 112 MCG tablet  TRESIBA FLEXTOUCH 100 UNIT/ML pen  tretinoin (RETIN-A) 0.025 % external  cream        Past Medical History:    Past Medical History:   Diagnosis Date     Abdominal adhesions 1984, 96,99     Abdominal adhesions      Acne rosacea      Allergic rhinitis      Allergic rhinitis, cause unspecified      Alopecia      Anemia      CAD (coronary artery disease)      Carotid stenosis      CKD (chronic kidney disease) stage 2, GFR 60-89 ml/min      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)      CPAP (continuous positive airway pressure) dependence      Depression      Diabetic gastroparesis (H)      Diet-controlled type 2 diabetes mellitus (H)      DM2 (diabetes mellitus, type 2) (H)      DVT (deep venous thrombosis) (H)      DVT of axillary vein, acute right (H)      Fibromyalgia      Gastro-oesophageal reflux disease      GERD (gastroesophageal reflux disease)      Hernia of unspecified site of abdominal cavity without mention of obstruction or gangrene      History of blood transfusion      HTN (hypertension)      HTN (hypertension)      Hypertriglyceridemia      Hypertriglyceridemia      Hypokalemia      Hypothyroidism      Obstructive sleep apnea      ANNETTE (obstructive sleep apnea)      ANNETTE on CPAP      Osteoarthritis of glenohumeral joint      Papillary carcinoma of thyroid (H)      Papillary carcinoma of thyroid (H)      PE (pulmonary embolism)      Poliomyelitis      Poliomyelitis      Postsurgical hypothyroidism      Pulmonary embolism (H)      Pulmonary embolus (H)      Pulmonary nodule      Rosacea      S/P carpal tunnel release      S/P hardware removal 01/2014     S/P shoulder surgery      Septic joint (H)      Septic joint of right knee joint (H)      Venous insufficiency      Venous insufficiency      Venous thrombosis 1999       Past Surgical History:    Past Surgical History:   Procedure Laterality Date     AMPUTATE TOE(S)  3/15/2012    Procedure:AMPUTATE TOE(S); Surgeon:RUBEN MOSES; Location:[facility] OR     AMPUTATE TOE(S)       APPENDECTOMY  1972     APPENDECTOMY       ARTHRODESIS FOOT   3/15/2012    Procedure:ARTHRODESIS FOOT; RIGHT TRIPLE ARTHRODESIS, FIFTH TOE AMPUTATION, LATERAL LIGAMENT RECONSTRUCTION, TENDON TRANSFER AND RELEASE [MINI C-ARM, ARTHREX 4.5 AND 6.7 STAPLES, BIOCOMPOSITE TENODESIS SCREWS]; Surgeon:SUKHDEEP METZ; Location: OR     ARTHRODESIS FOOT Right     Right foot triple arthrodesis and removal of hardware     ARTHROSCOPY SHOULDER  06/25/2015    REVISION SUBACROMIAL DECOMPRESSION, EXCISION OF GANGLION CYST, DEBRIDEMENT AND EXCISION OF THE GLENOHUMERAL JOINT GANGLION CYST, CORACOID DECOMPRESSION, POSSIBLE SUBSCAPULARIS REPAIR AND OPEN SUBSCAPULARIS BICEP     ARTHROSCOPY SHOULDER ROTATOR CUFF REPAIR       BIOPSY  Thyroid 2002     BREAST SURGERY  Biopsy     CHOLECYSTECTOMY       CHOLECYSTECTOMY       COLONOSCOPY  2018     COLOSTOMY  2/7/2012    Procedure:COLOSTOMY; CREATION OF SIGMOID COLOSTOMY AND EXTENSIVE  LYSIS OF ADHESIONS; Surgeon:MONTSERRAT BENDER; Location: OR     COLOSTOMY       EYE SURGERY       GENITOURINARY SURGERY  1999     GI SURGERY      weakened rectal sphincter with artificial stimulator     HERNIA REPAIR  1976     HYSTERECTOMY TOTAL ABDOMINAL       LAPAROTOMY, LYSIS ADHESIONS, COMBINED  2/7/2012    Procedure:COMBINED LAPAROTOMY, LYSIS ADHESIONS; Surgeon:MONTSERRAT BENDER; Location: OR     RELEASE CARPAL TUNNEL       RELEASE TENDON FOOT  3/15/2012    Procedure:RELEASE TENDON FOOT; Surgeon:SUKHDEEP METZ; Location: OR     REMOVE HARDWARE FOOT  12/13/2012    Procedure: REMOVE HARDWARE FOOT;  RIGHT FOOT REMOVAL OF HARDWARE;  Surgeon: Sukhdepe Metz MD;  Location:  OR     SHOULDER SURGERY  11/12/2020    LEFT SHOULDER HEMIARHTROPLASTY, BICEP TENODESIS     SOFT TISSUE SURGERY  2018, 2020     TENDON RELEASE      foot     THYROIDECTOMY       ZC FREEING BOWEL ADHESION,ENTEROLYSIS      1986, 1996, 1999     Z NONSPECIFIC PROCEDURE      throidectomy     ZZC TOTAL ABDOM HYSTERECTOMY  1980    + BSO        Family History:    family  history includes Arthritis in her mother, sister, and sister; Breast Cancer in her sister; Cancer in her maternal grandmother, sister, and sister; Cerebrovascular Disease in her mother; Colon Cancer in her sister; Depression in her sister; Diabetes in her brother, father, and sister; Heart Disease in her brother and mother; Hyperlipidemia in her brother; Hypertension in her brother, father, mother, sister, sister, and sister; Lipids in her brother and sister; Obesity in her maternal grandmother, mother, paternal grandmother, sister, sister, sister, and sister; Osteoporosis in her sister and sister; Other Cancer in her brother; Respiratory in her father; Skin Cancer in her maternal grandmother; Thyroid Disease in her sister and sister.    Social History:   reports that she has never smoked. She has never used smokeless tobacco. She reports that she does not drink alcohol and does not use drugs.  PCP: Shola Benoit     Physical Exam     Patient Vitals for the past 24 hrs:   BP Temp Temp src Pulse Resp SpO2   12/07/22 2033 -- -- -- 61 -- --   12/07/22 2030 (!) 201/74 -- -- -- -- --   12/07/22 1711 (!) 191/69 -- -- -- -- --   12/07/22 1708 -- 97.1  F (36.2  C) Temporal 71 16 100 %        Physical Exam  VS: Reviewed per above  HENT: normal speech  EYES: sclera anicteric  CV: Rate as noted, regular rhythm.   RESP: Effort normal. Breath sounds are normal bilaterally.  GI: no tenderness/rebound/guarding, not distended.  NEURO: Alert, moving all extremities  MSK: No deformity of the extremities, bilateral lower extremity edema with left lower extremity being erythematous and warm with some open sores.  No crepitance.  SKIN: Warm and dry    Emergency Department Course   ECG:  ECG results from 12/07/22   EKG 12-lead, tracing only     Value    Systolic Blood Pressure     Diastolic Blood Pressure     Ventricular Rate 60    Atrial Rate 60    CO Interval 158    QRS Duration 92        QTc 394    P Axis 31    R AXIS  -2    T Axis 33    Interpretation ECG      Sinus rhythm  Cannot rule out Anterior infarct , age undetermined  Abnormal ECG  When compared with ECG of 05-AUG-2019 19:15,  No significant change was found       *Note: Due to a large number of results and/or encounters for the requested time period, some results have not been displayed. A complete set of results can be found in Results Review.       Imaging:  US Lower Extremity Venous Duplex Bilateral   Final Result   IMPRESSION:   1.  No deep venous thrombosis in the bilateral lower extremities.         Report per radiology    Laboratory:  Labs Ordered and Resulted from Time of ED Arrival to Time of ED Departure   BASIC METABOLIC PANEL - Abnormal       Result Value    Sodium 141      Potassium 4.5      Chloride 105      Carbon Dioxide (CO2) 25      Anion Gap 11      Urea Nitrogen 26.0 (*)     Creatinine 1.30 (*)     Calcium 9.2      Glucose 182 (*)     GFR Estimate 42 (*)    NT PROBNP INPATIENT - Normal    N terminal Pro BNP Inpatient 612     CBC WITH PLATELETS AND DIFFERENTIAL    WBC Count 8.6      RBC Count 4.00      Hemoglobin 12.0      Hematocrit 37.7      MCV 94      MCH 30.0      MCHC 31.8      RDW 13.0      Platelet Count 243      % Neutrophils 62      % Lymphocytes 26      % Monocytes 7      % Eosinophils 3      % Basophils 1      % Immature Granulocytes 1      NRBCs per 100 WBC 0      Absolute Neutrophils 5.5      Absolute Lymphocytes 2.2      Absolute Monocytes 0.6      Absolute Eosinophils 0.2      Absolute Basophils 0.0      Absolute Immature Granulocytes 0.1      Absolute NRBCs 0.0            Emergency Department Course:             Reviewed:  I reviewed nursing notes, vitals and past medical history    Assessments:  I obtained history and examined the patient as noted above.   I rechecked the patient and explained findings.     Interventions:  Medications   cephALEXin (KEFLEX) capsule 500 mg (500 mg Oral Given 12/7/22 1929)   doxycycline hyclate  (VIBRAMYCIN) capsule 100 mg (100 mg Oral Given 12/7/22 1929)        Disposition:  The patient was discharged to home.     Impression & Plan        Medical Decision Making:  Patient presents to the ER for evaluation of increasing leg swelling, left lower leg redness and warmth.  On arrival she is hypertensive.  There is no evidence of DVT on ultrasound.  There was concern for cellulitic changes left lower extremity.  No signs of severe sepsis or necrotizing fasciitis at this time.  Patient is given oral antibiotics.  History not consistent with pulmonary edema or severe CHF.  Patient does take Lasix at baseline.  Plan for 5-day course of 40 mg lasix daily and recheck in the clinic setting for repeat chemistry panel as well as reassessment of her left lower extremity cellulitis and leg edema.  Also recommend reassessment of her blood pressure as they are quite elevated today and patient may benefit from adjustment of her antihypertensive regimen.  Return precautions discussed prior to discharge.    Diagnosis:    ICD-10-CM    1. Bilateral lower extremity edema  R60.0       2. Cellulitis of left leg  L03.116       3. Hypertension, unspecified type  I10            Discharge Medications:  Discharge Medication List as of 12/7/2022  8:44 PM      START taking these medications    Details   cephALEXin (KEFLEX) 500 MG capsule Take 1 capsule (500 mg) by mouth 4 times daily for 7 days, Disp-28 capsule, R-0, E-Prescribe      doxycycline hyclate (VIBRAMYCIN) 100 MG capsule Take 1 capsule (100 mg) by mouth 2 times daily for 7 days, Disp-14 capsule, R-0, E-Prescribe              12/7/2022   Akhil Cohn MD Lindenbaum, Elan, MD  12/07/22 2073

## 2022-12-14 ENCOUNTER — OFFICE VISIT (OUTPATIENT)
Dept: FAMILY MEDICINE | Facility: CLINIC | Age: 77
End: 2022-12-14
Payer: MEDICARE

## 2022-12-14 VITALS
BODY MASS INDEX: 41.43 KG/M2 | WEIGHT: 211 LBS | DIASTOLIC BLOOD PRESSURE: 71 MMHG | TEMPERATURE: 96.9 F | SYSTOLIC BLOOD PRESSURE: 149 MMHG | HEIGHT: 60 IN | HEART RATE: 73 BPM | OXYGEN SATURATION: 96 % | RESPIRATION RATE: 16 BRPM

## 2022-12-14 DIAGNOSIS — E11.69 TYPE 2 DIABETES MELLITUS WITH OTHER SPECIFIED COMPLICATION, WITHOUT LONG-TERM CURRENT USE OF INSULIN (H): ICD-10-CM

## 2022-12-14 DIAGNOSIS — I87.2 VENOUS INSUFFICIENCY: ICD-10-CM

## 2022-12-14 DIAGNOSIS — L02.419 CELLULITIS AND ABSCESS OF LEG: ICD-10-CM

## 2022-12-14 DIAGNOSIS — I10 BENIGN ESSENTIAL HYPERTENSION: Primary | ICD-10-CM

## 2022-12-14 DIAGNOSIS — L03.119 CELLULITIS AND ABSCESS OF LEG: ICD-10-CM

## 2022-12-14 DIAGNOSIS — Z79.899 ENCOUNTER FOR LONG-TERM (CURRENT) USE OF MEDICATIONS: ICD-10-CM

## 2022-12-14 PROCEDURE — 99214 OFFICE O/P EST MOD 30 MIN: CPT | Performed by: INTERNAL MEDICINE

## 2022-12-14 RX ORDER — CEPHALEXIN 500 MG/1
500 CAPSULE ORAL 4 TIMES DAILY
Qty: 28 CAPSULE | Refills: 0 | Status: SHIPPED | OUTPATIENT
Start: 2022-12-14 | End: 2022-12-21

## 2022-12-14 RX ORDER — DOXYCYCLINE 100 MG/1
100 CAPSULE ORAL 2 TIMES DAILY
Qty: 14 CAPSULE | Refills: 0 | Status: SHIPPED | OUTPATIENT
Start: 2022-12-14 | End: 2022-12-21

## 2022-12-14 NOTE — PROGRESS NOTES
Raheem Cochran is a 77 year old, presenting for the following health issues:  ER F/U      HPI     ED/UC Followup:    Facility:  [facility]  Date of visit: 12/7/2022  Reason for visit: Bilateral Lower extremity edema.   Current Status: legs still swollen       Encounter for long-term (current) use of medications  Type 2 diabetes mellitus with other specified complication (H)  Benign essential hypertension  Venous insufficiency    Chasity Hodgson has been taking blood pressure medications as scheduled including atenolol, lisinopril and amlodipine.  She is not noticing any low blood pressure readings currently.  In the past week her blood pressure remained stable.  She notes that she was in the emergency department one week ago for increasing leg swelling and left lower redness.  She did have ultrasound showing no evidence of deep vein thrombosis and only concerns for cellulitis.  She was given oral antibiotics and recommended to increase her lasix dose to 40 mg x 5 days with follow up in the clinic.  She has had improvement in both bilateral lower extremity edema as well as resolution of the left leg cellulitis.     She does have additional stress of  who is now in hospice program and recently admitted for sepsis.      Review of Systems   Constitutional, HEENT, cardiovascular, pulmonary, GI, , musculoskeletal, neuro, skin, endocrine and psych systems are negative, except as otherwise noted.      Objective    BP (!) 149/71 (BP Location: Right arm, Patient Position: Sitting, Cuff Size: Adult Regular)   Pulse 73   Temp 96.9  F (36.1  C)   Resp 16   Ht 1.524 m (5')   Wt 95.7 kg (211 lb)   SpO2 96%   BMI 41.21 kg/m    Body mass index is 41.21 kg/m .  Physical Exam   GENERAL: healthy, alert and no distress  EYES: Eyes grossly normal to inspection,   RESP: lungs clear to auscultation - no rales, rhonchi or wheezes  CV: regular rate and rhythm, 3/6 systolic murmur, 2+ peripheral edema     ABDOMEN: soft, nontender, no hepatosplenomegaly, no masses and bowel sounds normal  MS: no gross musculoskeletal defects noted 2+ edema  SKIN: no suspicious lesions or rashes  NEURO: Normal strength and tone, mentation intact and speech normal  PSYCH: mentation appears normal, affect normal/bright        Patient Instructions   (I10) Benign essential hypertension  (primary encounter diagnosis)  Comment: blood pressure is stable, continue furosemide - increase to 40 mg daily x 7 days.  Check labs today  Plan: Basic metabolic panel  (Ca, Cl, CO2, Creat,         Gluc, K, Na, BUN)            (Z79.899) Encounter for long-term (current) use of medications  Comment: as above   Plan:     (E11.69) Type 2 diabetes mellitus with other specified complication, without long-term current use of insulin (H)  Comment: hemoglobin A1c is improving   Plan: Basic metabolic panel  (Ca, Cl, CO2, Creat,         Gluc, K, Na, BUN)            (I87.2) Venous insufficiency  Comment: noted bilateral venous insufficiency - continue wrapping and keep legs elevated when you are able x 7 days  Plan:     (L03.119,  L02.419) Cellulitis and abscess of leg  Comment: Noted persistent cellulitis and we will increase duration of antibiotics - keflex and doxycycline x one additional week  Plan: cephALEXin (KEFLEX) 500 MG capsule, doxycycline        hyclate (VIBRAMYCIN) 100 MG capsule

## 2022-12-14 NOTE — PATIENT INSTRUCTIONS
(I10) Benign essential hypertension  (primary encounter diagnosis)  Comment: blood pressure is stable, continue furosemide - increase to 40 mg daily x 7 days.  Check labs today  Plan: Basic metabolic panel  (Ca, Cl, CO2, Creat,         Gluc, K, Na, BUN)            (Z79.899) Encounter for long-term (current) use of medications  Comment: as above   Plan:     (E11.69) Type 2 diabetes mellitus with other specified complication, without long-term current use of insulin (H)  Comment: hemoglobin A1c is improving   Plan: Basic metabolic panel  (Ca, Cl, CO2, Creat,         Gluc, K, Na, BUN)            (I87.2) Venous insufficiency  Comment: noted bilateral venous insufficiency - continue wrapping and keep legs elevated when you are able x 7 days  Plan:     (L03.119,  L02.419) Cellulitis and abscess of leg  Comment: Noted persistent cellulitis and we will increase duration of antibiotics - keflex and doxycycline x one additional week  Plan: cephALEXin (KEFLEX) 500 MG capsule, doxycycline        hyclate (VIBRAMYCIN) 100 MG capsule

## 2022-12-15 ENCOUNTER — LAB (OUTPATIENT)
Dept: LAB | Facility: CLINIC | Age: 77
End: 2022-12-15
Payer: MEDICARE

## 2022-12-15 DIAGNOSIS — E11.69 TYPE 2 DIABETES MELLITUS WITH OTHER SPECIFIED COMPLICATION, WITHOUT LONG-TERM CURRENT USE OF INSULIN (H): ICD-10-CM

## 2022-12-15 DIAGNOSIS — I25.10 CORONARY ARTERY DISEASE INVOLVING NATIVE CORONARY ARTERY OF NATIVE HEART WITHOUT ANGINA PECTORIS: ICD-10-CM

## 2022-12-15 DIAGNOSIS — I10 BENIGN ESSENTIAL HYPERTENSION: ICD-10-CM

## 2022-12-15 LAB
ALT SERPL W P-5'-P-CCNC: 18 U/L (ref 10–35)
ANION GAP SERPL CALCULATED.3IONS-SCNC: 12 MMOL/L (ref 7–15)
BUN SERPL-MCNC: 42.1 MG/DL (ref 8–23)
CALCIUM SERPL-MCNC: 9.1 MG/DL (ref 8.8–10.2)
CHLORIDE SERPL-SCNC: 105 MMOL/L (ref 98–107)
CHOLEST SERPL-MCNC: 145 MG/DL
CREAT SERPL-MCNC: 1.42 MG/DL (ref 0.51–0.95)
DEPRECATED HCO3 PLAS-SCNC: 24 MMOL/L (ref 22–29)
GFR SERPL CREATININE-BSD FRML MDRD: 38 ML/MIN/1.73M2
GLUCOSE SERPL-MCNC: 172 MG/DL (ref 70–99)
HDLC SERPL-MCNC: 29 MG/DL
LDLC SERPL CALC-MCNC: 50 MG/DL
NONHDLC SERPL-MCNC: 116 MG/DL
POTASSIUM SERPL-SCNC: 4.7 MMOL/L (ref 3.4–5.3)
SODIUM SERPL-SCNC: 141 MMOL/L (ref 136–145)
TRIGL SERPL-MCNC: 331 MG/DL

## 2022-12-15 PROCEDURE — 36415 COLL VENOUS BLD VENIPUNCTURE: CPT

## 2022-12-15 PROCEDURE — 82465 ASSAY BLD/SERUM CHOLESTEROL: CPT

## 2022-12-15 PROCEDURE — 83718 ASSAY OF LIPOPROTEIN: CPT

## 2022-12-17 NOTE — RESULT ENCOUNTER NOTE
Gerard Campos,    I have had the opportunity to review your recent results and an interpretation is as follows:  Your basic metabolic panel shows worsening renal function.  Please decrease Lasix back to 20 mg daily and follow up with cardiology to discuss lasix dosing in the future.     Sincerely,  Shola Benoit MD

## 2022-12-21 ENCOUNTER — OFFICE VISIT (OUTPATIENT)
Dept: FAMILY MEDICINE | Facility: CLINIC | Age: 77
End: 2022-12-21
Payer: MEDICARE

## 2022-12-21 VITALS
HEIGHT: 60 IN | DIASTOLIC BLOOD PRESSURE: 71 MMHG | WEIGHT: 211 LBS | HEART RATE: 63 BPM | TEMPERATURE: 97.3 F | RESPIRATION RATE: 15 BRPM | SYSTOLIC BLOOD PRESSURE: 173 MMHG | OXYGEN SATURATION: 100 % | BODY MASS INDEX: 41.43 KG/M2

## 2022-12-21 DIAGNOSIS — Z79.4 TYPE 2 DIABETES MELLITUS WITH OTHER SPECIFIED COMPLICATION, WITH LONG-TERM CURRENT USE OF INSULIN (H): ICD-10-CM

## 2022-12-21 DIAGNOSIS — E11.69 TYPE 2 DIABETES MELLITUS WITH OTHER SPECIFIED COMPLICATION, WITH LONG-TERM CURRENT USE OF INSULIN (H): ICD-10-CM

## 2022-12-21 DIAGNOSIS — I10 BENIGN ESSENTIAL HYPERTENSION: ICD-10-CM

## 2022-12-21 DIAGNOSIS — L03.119 CELLULITIS AND ABSCESS OF LEG: Primary | ICD-10-CM

## 2022-12-21 DIAGNOSIS — L02.419 CELLULITIS AND ABSCESS OF LEG: Primary | ICD-10-CM

## 2022-12-21 LAB
ANION GAP SERPL CALCULATED.3IONS-SCNC: 16 MMOL/L (ref 7–15)
BUN SERPL-MCNC: 23.3 MG/DL (ref 8–23)
CALCIUM SERPL-MCNC: 10 MG/DL (ref 8.8–10.2)
CHLORIDE SERPL-SCNC: 106 MMOL/L (ref 98–107)
CREAT SERPL-MCNC: 1.26 MG/DL (ref 0.51–0.95)
DEPRECATED HCO3 PLAS-SCNC: 22 MMOL/L (ref 22–29)
ERYTHROCYTE [DISTWIDTH] IN BLOOD BY AUTOMATED COUNT: 12.9 % (ref 10–15)
GFR SERPL CREATININE-BSD FRML MDRD: 44 ML/MIN/1.73M2
GLUCOSE SERPL-MCNC: 173 MG/DL (ref 70–99)
HCT VFR BLD AUTO: 37.5 % (ref 35–47)
HGB BLD-MCNC: 12.1 G/DL (ref 11.7–15.7)
MCH RBC QN AUTO: 29.9 PG (ref 26.5–33)
MCHC RBC AUTO-ENTMCNC: 32.3 G/DL (ref 31.5–36.5)
MCV RBC AUTO: 93 FL (ref 78–100)
PLATELET # BLD AUTO: 220 10E3/UL (ref 150–450)
POTASSIUM SERPL-SCNC: 5 MMOL/L (ref 3.4–5.3)
RBC # BLD AUTO: 4.05 10E6/UL (ref 3.8–5.2)
SODIUM SERPL-SCNC: 144 MMOL/L (ref 136–145)
WBC # BLD AUTO: 9.8 10E3/UL (ref 4–11)

## 2022-12-21 PROCEDURE — 85027 COMPLETE CBC AUTOMATED: CPT | Performed by: INTERNAL MEDICINE

## 2022-12-21 PROCEDURE — 99213 OFFICE O/P EST LOW 20 MIN: CPT | Performed by: INTERNAL MEDICINE

## 2022-12-21 PROCEDURE — 36415 COLL VENOUS BLD VENIPUNCTURE: CPT | Performed by: INTERNAL MEDICINE

## 2022-12-21 PROCEDURE — 80048 BASIC METABOLIC PNL TOTAL CA: CPT | Performed by: INTERNAL MEDICINE

## 2022-12-21 RX ORDER — DOXYCYCLINE 100 MG/1
100 CAPSULE ORAL 2 TIMES DAILY
Qty: 14 CAPSULE | Refills: 0 | Status: SHIPPED | OUTPATIENT
Start: 2022-12-21 | End: 2023-01-06

## 2022-12-21 RX ORDER — CEPHALEXIN 500 MG/1
500 CAPSULE ORAL 4 TIMES DAILY
Qty: 28 CAPSULE | Refills: 0 | Status: SHIPPED | OUTPATIENT
Start: 2022-12-21 | End: 2023-01-06

## 2022-12-21 ASSESSMENT — PAIN SCALES - GENERAL: PAINLEVEL: NO PAIN (0)

## 2022-12-21 NOTE — PROGRESS NOTES
Raheem Cochran is a 77 year old, presenting for the following health issues:  RECHECK      History of Present Illness       Reason for visit:  Swelling in both legs, cellulitis in left leg, review lab result for kidney function    She eats 2-3 servings of fruits and vegetables daily.She consumes 1 sweetened beverage(s) daily.She exercises with enough effort to increase her heart rate 10 to 19 minutes per day.  She exercises with enough effort to increase her heart rate 3 or less days per week.   She is taking medications regularly.        Type 2 diabetes mellitus with other specified complication, with long-term current use of insulin (H)  Cellulitis and abscess of leg  Benign essential hypertension   Chasity Hodgson presents to the clinic today for follow up of her cellulitis.  She has had difficulty with keeping her legs elevated.  She is not experiencing fever.  No chills.  No heart racing.  No chest pain, no shortness of breath, no cough.  She is not drinking as much fluids as she should blood pressure.  She is taking lasix 20 mg daily now.    Review of Systems   Constitutional, HEENT, cardiovascular, pulmonary, GI, , musculoskeletal, neuro, skin, endocrine and psych systems are negative, except as otherwise noted.      Objective    BP (!) 151/70 (BP Location: Right arm, Patient Position: Sitting, Cuff Size: Adult Regular)   Pulse 63   Temp 97.3  F (36.3  C) (Tympanic)   Resp 15   Ht 1.524 m (5')   Wt 95.7 kg (211 lb)   SpO2 100%   BMI 41.21 kg/m    Body mass index is 41.21 kg/m .  Physical Exam   GENERAL: healthy, alert and no distress  EYES: Eyes grossly normal to inspection,    NECK: no adenopathy, no asymmetry,   RESP: lungs clear to auscultation - no rales, rhonchi or wheezes  CV: regular rate and rhythm, 2/6 systolic murmur, click or rub, + edema  ABDOMEN: soft, nontender, no hepatosplenomegaly   MS: no gross musculoskeletal defects noted, no edema  SKIN: below  NEURO: Normal strength and tone,  mentation intact and speech normal  PSYCH: mentation appears normal, affect normal/bright          Patient Instructions   (L03.119,  L02.419) Cellulitis and abscess of leg  (primary encounter diagnosis)  Comment: Doing much better, however not resolved.  We will continue 1 additional week of antibiotics and we will follow up in 1 week  Plan:     (E11.69,  Z79.4) Type 2 diabetes mellitus with other specified complication, with long-term current use of insulin (H)  Comment: Continue follow up in endocrinology   Plan:     (I10) Benign essential hypertension  Comment: blood pressure is stable.  We will recheck before you leave.  Continue on lower dose, 20 mg lasix and recheck labs today  Plan:

## 2022-12-21 NOTE — PATIENT INSTRUCTIONS
(L03.119,  L02.419) Cellulitis and abscess of leg  (primary encounter diagnosis)  Comment: Doing much better, however not resolved.  We will continue 1 additional week of antibiotics and we will follow up in 1 week  Plan:     (E11.69,  Z79.4) Type 2 diabetes mellitus with other specified complication, with long-term current use of insulin (H)  Comment: Continue follow up in endocrinology   Plan:     (I10) Benign essential hypertension  Comment: blood pressure is stable.  We will recheck before you leave.  Continue on lower dose, 20 mg lasix and recheck labs today  Plan:

## 2022-12-24 NOTE — RESULT ENCOUNTER NOTE
Gerard Gaspar,    I have had the opportunity to review your recent results and an interpretation is as follows:  Your complete blood counts shows still stable hemoglobin and white blood cell count  Your basic metabolic panel shows improved renal function - continue current dose of lasix     Sincerely,  Shola Benoit MD

## 2022-12-28 ENCOUNTER — OFFICE VISIT (OUTPATIENT)
Dept: UROLOGY | Facility: CLINIC | Age: 77
End: 2022-12-28
Payer: MEDICARE

## 2022-12-28 ENCOUNTER — LAB (OUTPATIENT)
Dept: LAB | Facility: CLINIC | Age: 77
End: 2022-12-28
Payer: MEDICARE

## 2022-12-28 VITALS
WEIGHT: 211 LBS | SYSTOLIC BLOOD PRESSURE: 118 MMHG | DIASTOLIC BLOOD PRESSURE: 74 MMHG | HEIGHT: 60 IN | BODY MASS INDEX: 41.43 KG/M2

## 2022-12-28 DIAGNOSIS — N39.46 MIXED INCONTINENCE: Primary | ICD-10-CM

## 2022-12-28 DIAGNOSIS — N39.42 URINARY INCONTINENCE WITHOUT SENSORY AWARENESS: ICD-10-CM

## 2022-12-28 DIAGNOSIS — N89.8 VAGINAL DISCHARGE: ICD-10-CM

## 2022-12-28 LAB
CLUE CELLS: ABNORMAL
RESIDUAL VOLUME (RV) (EXTERNAL): 35
TRICHOMONAS, WET PREP: ABNORMAL
WBC'S/HIGH POWER FIELD, WET PREP: ABNORMAL
YEAST, WET PREP: ABNORMAL

## 2022-12-28 PROCEDURE — 87210 SMEAR WET MOUNT SALINE/INK: CPT

## 2022-12-28 PROCEDURE — 51798 US URINE CAPACITY MEASURE: CPT | Performed by: PHYSICIAN ASSISTANT

## 2022-12-28 PROCEDURE — 99213 OFFICE O/P EST LOW 20 MIN: CPT | Mod: 25 | Performed by: PHYSICIAN ASSISTANT

## 2022-12-28 RX ORDER — TOLTERODINE 4 MG/1
4 CAPSULE, EXTENDED RELEASE ORAL DAILY
Qty: 30 CAPSULE | Refills: 11 | Status: SHIPPED | OUTPATIENT
Start: 2022-12-28 | End: 2023-07-06 | Stop reason: ALTCHOICE

## 2022-12-28 ASSESSMENT — PAIN SCALES - GENERAL: PAINLEVEL: SEVERE PAIN (7)

## 2022-12-28 NOTE — PATIENT INSTRUCTIONS
Would recommend wet prep to look for concern for yeast infection.    **This order will print in the [facility] Scheduling Office**    Physical Therapy available through:    *[facility]    Call one number to schedule at any of the above locations: [phone].    Your provider has referred you to: Physical Therapy at [facility] or [facility]  Please be aware that coverage of these services is subject to the terms and limitations of your health insurance plan.  Call member services at your health plan with any benefit or coverage questions.      Please bring the following to your appointment:    *Your personal calendar for scheduling future appointments  *Comfortable clothing     Would recommend repeat exam and return in 3-4 months.    Will also trial Detrol 4 mg once daily. This medication will take about 4 weeks to work.    You have been prescribed medication to help relax your bladder.    This medication may help control (or improve) urinary frequency, urgency and urge incontinence.    Common side effects include:  Dry mouth (Biotene mouthwash can help with this.)  Constipation  Drowsiness/Mental Fogginess  Blurred vision/Dry Eyes  Difficulty with sweating    Rare side effect:  Urinary retention     Would consider cystoscopy and possible urodynamics study with Dr. Zendejas if continued issues.

## 2022-12-28 NOTE — PROGRESS NOTES
Subjective      REQUESTING PROVIDER   Referred Self     REASON FOR CONSULT   Urinary incontinence    HISTORY OF PRESENT ILLNESS   Ms. Hodgson is a 77-year-old female, who presents today for further evaluation recommendations regarding urinary incontinence.  She had previously seen Dr. Ziegler in 2004 for Durasphere injections.  This occurred in May, July, September 2004.  Also noted that patient had a Birch elsewhere.      At the time when she saw Dr. Ziegler she was going through 3-4 pads per day.  She notes that now she is up to 7-8 pads per day that are drenched.  She sometimes will have to change her clothes several times during the day.  It has been going on for years, but urinary leakage has been significantly bad over the last 3 years.    Patient endorses urgency and when standing will lose her bladder.  She is on Lasix and does have peripheral edema.  Patient denies any hematuria or dysuria.  Patient can just go to the bathroom and start leaking.  Some of her urinary incontinence is insensate in nature.  She does note that she met with Dr. Jiménez, at [facility] in 2020, and she was diagnosed with overactivity of the bladder.  It appears that she had been started on Premarin.  She previously had tried and Oxytrol patch with Dr. Ziegler.    Patient has an ostomy.  She denies issues with her bowel movements since this is been placed.  She has nocturia 0-1 times.  Patient did trial pelvic floor physical therapy exercises and sessions at the recommendation of the [facility] provider.  She has continued to try to complete these exercises.    Fluid intake includes water, occasional ICE water, and juice.    The following portions of the patient's history were reviewed and updated as appropriate: allergies, current medications, past family history, past medical history, past social history, past surgical history and problem list.     REVIEW OF SYSTEMS   Review of Systems   Constitutional: Negative for chills  and fever.   Respiratory: Negative for shortness of breath.    Cardiovascular: Negative for chest pain.   Gastrointestinal: Negative for nausea and vomiting.   Genitourinary: Positive for enuresis, frequency and urgency. Negative for hematuria.      Per HPI.     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)     Anemia due to blood loss, acute     ACP (advance care planning)     Postsurgical hypothyroidism     Type 2 diabetes, HbA1c goal < 7% (H)     Gastroparesis     Papillary carcinoma of thyroid (H)     Alopecia     Pulmonary nodule     Esophageal reflux     Dermatitis     Acne rosacea     Diabetic gastroparesis (H)     Poliomyelitis     Left knee pain     Carotid stenosis     Right shoulder pain     Type 2 diabetes mellitus with other specified complication (H)     Insufficiency, arterial, peripheral (H)     Allergic dermatitis due to other chemical product     Normocytic anemia     Morbid obesity (H)     Mild major depression (H)     CKD (chronic kidney disease) stage 3, GFR 30-59 ml/min (H)     Renal artery stenosis (H)     Neuropathic pain     Mild major depression (H)     Venous insufficiency     Avascular necrosis of bone of hip, left (H)     Contact dermatitis     Diabetic nephropathy associated with type 2 diabetes mellitus (H)     DVT of upper extremity (deep vein thrombosis) (H)     Dysphagia     Edema of lower extremity     History of colonic polyps     History of pulmonary embolus (PE)     Iron deficiency anemia     Inguinal pain     Moderate protein-calorie malnutrition (H)     Osteoarthritis of left hip     Primary insomnia     Right lower quadrant pain     Status post total shoulder arthroplasty, left     Surgical aftercare, musculoskeletal  system     Vitamin D deficiency      Past Medical History:   Diagnosis Date     Abdominal adhesions 1984, 96,99    s/p lysis     Abdominal adhesions      Acne rosacea      Allergic rhinitis      Allergic rhinitis, cause unspecified      Alopecia      Anemia      CAD (coronary artery disease)      Carotid stenosis      CKD (chronic kidney disease) stage 2, GFR 60-89 ml/min      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)      CPAP (continuous positive airway pressure) dependence      Depression      Diabetic gastroparesis (H)      Diet-controlled type 2 diabetes mellitus (H)      DM2 (diabetes mellitus, type 2) (H)      DVT (deep venous thrombosis) (H)      DVT of axillary vein, acute right (H)      Fibromyalgia      Gastro-oesophageal reflux disease      GERD (gastroesophageal reflux disease)      Hernia of unspecified site of abdominal cavity without mention of obstruction or gangrene     bilateral     Hernia, abdominal      History of blood transfusion     10/ 1980     History of thrombophlebitis      HTN (hypertension)      HTN (hypertension)      Hypertriglyceridemia      Hypertriglyceridemia      Hypokalemia      Hypothyroidism      Mumps      Obstructive sleep apnea      ANNETTE (obstructive sleep apnea)      ANNETTE on CPAP      Osteoarthritis of glenohumeral joint      Papillary carcinoma of thyroid (H)     s/p thyroidectomy - Ruegemer     Papillary carcinoma of thyroid (H)      PE (pulmonary embolism)      Poliomyelitis     poor circulation right leg     Poliomyelitis      Postsurgical hypothyroidism     s/p papillary thryoid cancer - Ruegemer     Pulmonary embolism (H)      Pulmonary embolus (H)      Pulmonary nodule      Rosacea      S/P carpal tunnel release     bilateral     S/P hardware removal 01/2014    still with lingering foot pain     S/P shoulder surgery     bilateral     Septic joint (H)     right knee     Septic joint of right knee joint (H)      Venous insufficiency      Venous insufficiency      Venous thrombosis 1999     right axillary vein      Past Surgical History:   Procedure Laterality Date     AMPUTATE TOE(S)  03/15/2012    Procedure:AMPUTATE TOE(S); Surgeon:SUKHDEEP METZ; Location: OR     AMPUTATE TOE(S)       APPENDECTOMY  1972     APPENDECTOMY       ARTHRODESIS FOOT  03/15/2012    Procedure:ARTHRODESIS FOOT; RIGHT TRIPLE ARTHRODESIS, FIFTH TOE AMPUTATION, LATERAL LIGAMENT RECONSTRUCTION, TENDON TRANSFER AND RELEASE [MINI C-ARM, ARTHREX 4.5 AND 6.7 STAPLES, BIOCOMPOSITE TENODESIS SCREWS]; Surgeon:SUKHDEEP METZ; Location: OR     ARTHRODESIS FOOT Right     Right foot triple arthrodesis and removal of hardware     ARTHROSCOPY SHOULDER  06/25/2015    REVISION SUBACROMIAL DECOMPRESSION, EXCISION OF GANGLION CYST, DEBRIDEMENT AND EXCISION OF THE GLENOHUMERAL JOINT GANGLION CYST, CORACOID DECOMPRESSION, POSSIBLE SUBSCAPULARIS REPAIR AND OPEN SUBSCAPULARIS BICEP     ARTHROSCOPY SHOULDER ROTATOR CUFF REPAIR       BIOPSY  Thyroid 2002     BLADDER SURGERY       BREAST SURGERY  Biopsy     CHOLECYSTECTOMY       CHOLECYSTECTOMY       COLONOSCOPY  2018     COLOSTOMY  02/07/2012    Procedure:COLOSTOMY; CREATION OF SIGMOID COLOSTOMY AND EXTENSIVE  LYSIS OF ADHESIONS; Surgeon:MONTSERRAT BENDER; Location: OR     COLOSTOMY       CYSTOSCOPY       EYE SURGERY       GENITOURINARY SURGERY  1999     GI SURGERY      weakened rectal sphincter with artificial stimulator     HERNIA REPAIR  1976     HYSTERECTOMY TOTAL ABDOMINAL       LAPAROTOMY, LYSIS ADHESIONS, COMBINED  02/07/2012    Procedure:COMBINED LAPAROTOMY, LYSIS ADHESIONS; Surgeon:MONTSERRAT BENDER; Location: OR     RELEASE CARPAL TUNNEL       RELEASE TENDON FOOT  03/15/2012    Procedure:RELEASE TENDON FOOT; Surgeon:SUKHDEEP METZ; Location: OR     REMOVE HARDWARE FOOT  12/13/2012    Procedure: REMOVE HARDWARE FOOT;  RIGHT FOOT REMOVAL OF HARDWARE;  Surgeon: Sukhdeep Metz MD;  Location:  OR     SHOULDER SURGERY  11/12/2020    LEFT  SHOULDER HEMIARHTROPLASTY, BICEP TENODESIS     SOFT TISSUE SURGERY  2018, 2020     TENDON RELEASE      foot     THYROIDECTOMY       ZZC FREEING BOWEL ADHESION,ENTEROLYSIS      1986, 1996, 1999     ZZC NONSPECIFIC PROCEDURE      throidectomy     ZZC TOTAL ABDOM HYSTERECTOMY  1980    + BSO      Social History:   .  Never smoker.     Objective      PHYSICAL EXAM   /74   Ht 1.524 m (5')   Wt 95.7 kg (211 lb)   BMI 41.21 kg/m     Physical Exam  Constitutional:       Appearance: Normal appearance.   HENT:      Head: Normocephalic.   Eyes:      General: No scleral icterus.  Pulmonary:      Effort: Pulmonary effort is normal.   Abdominal:      Comments: Left sided colostomy.   Genitourinary:     Comments: Normal intact perineal sensation bilaterally.  Internal labia have bilateral erythema and slough.  Normal-appearing external labia.  Findings of bulging near the urethra.  Evidence of grade discharge.  Kegel's 1 out of 5, weak.  Right sided levator tenderness.  No stress incontinence elicited with cough or Valsalva on examination.  Slight hypermobility of the urethra.  Musculoskeletal:      Cervical back: Normal range of motion.      Comments: Ambulates with a walker.     Skin:     General: Skin is warm and dry.   Neurological:      General: No focal deficit present.      Mental Status: She is alert and oriented to person, place, and time.   Psychiatric:         Mood and Affect: Mood normal.         Behavior: Behavior normal.          LABORATORY   Recent Labs   Lab Test 10/12/22  1219   COLOR Yellow   APPEARANCE Clear   URINEGLC Negative   URINEBILI Negative   URINEKETONE Negative   SG 1.025   UBLD Negative   URINEPH 5.5   PROTEIN Negative   UROBILINOGEN 0.2   NITRITE Negative   LEUKEST Trace*   RBCU 0-2   WBCU 0-5     Patient missed the hat for UA.    TESTING    PVR: 35 mL    Assessment & Plan    1. Mixed incontinence    2. Urinary incontinence without sensory awareness    3. Vaginal discharge        I had  the pleasure today of meeting with Ms. Hodgson to discuss her urinary incontinence.  She has previously had a surgical intervention as well as bulking agents.  She has worsening urinary incontinence and notes at sometimes there is urgency and sometimes it is insensate.  Based upon her description, I am not completely certain as to if it is urge or stress predominant.  Patient has weak pelvic floor on examination.  She also has evidence of pelvic tension.    We discussed that we typically would treat urge incontinence first in a mixed incontinence picture of urge and stress incontinence. Typical treatments for urge incontinence include avoiding bladder irritants, biofeedback bladder retraining, overactive bladder medications such as oxybutynin, posterior tibial nerve stimulation (PTNS), Botox (which would require learning to   perform clean intermittent catheterization and place it works too well), and InterStim. We typically go through this in a stepwise fashion.     Possible side effects with overactive bladder medications that are anticholinergic medication include dry eyes, dry mouth, constipation, difficulties with sweating, and possible mental fogginess. These medications typically take 3-4 weeks to work. Side effects will typically show up prior to this. Myrbetriq is not in anticholinergic medication, but it is rather extensive even after insurance. Most common side effect with this is hypertension.     We discussed PTNS. This includes placement of needles in the posterior tibial nerve. This is once a week for 12 weeks. Patient does need to make sure they do not miss more than 1 session, or they will need to start over. It is 30 minutes at a time. We do not expect improvement until at least week 5-6, and some patients take longer. It does not work for all patients.     Botox is injected into the bladder. It typically takes 2 weeks before this takes effect. Average length of time per treatment that patients get  relief for is approximately 6 months. Some patients get more lasting relief. We also discussed briefly the implantable device InterStim.     We also briefly discussed stress incontinence treatment options including Kegel exercises/biofeedback bladder retraining,Estim, pessary, Poise Impressa, sling, and bulking agents.     We also discussed the role of weight loss for urgency and stress incontinence. This typically improves both of these.    After discussion with the patient, she would like to trial the following:    -We will plan on referral to pelvic floor physical therapy for a refresher exercises as it has been sometime in her pelvic floor still rather weak on examination.    -We will plan on trialing overactive bladder medication in the form of Detrol 4 mg once daily.  Discussed possible adverse effects and the amount of time it would take to see some results.    -If patient continues to have issues, would consider cystoscopy and possible urodynamic study with Dr. Zendejas given slightly unclear description of urinary incontinence.    -Based upon vaginal discharge, would recommend wet prep to look for infection.    -Follow-up with repeat exam in approximately 3 to 4 months.    Signed by:     Terra Bridges PA-C 12/28/2022 1:58 PM

## 2022-12-28 NOTE — LETTER
12/28/2022       RE: Chasity Hodgson  [id number] [place] [address]     Dear Colleague,    Thank you for referring your patient, Chasity Hodgson, to the [facility] [place] at [facility]. Please see a copy of my visit note below.    Subjective       REQUESTING PROVIDER   Referred Self     REASON FOR CONSULT   Urinary incontinence    HISTORY OF PRESENT ILLNESS   Ms. Hodgson is a 77-year-old female, who presents today for further evaluation recommendations regarding urinary incontinence.  She had previously seen Dr. Ziegler in 2004 for Durasphere injections.  This occurred in May, July, September 2004.  Also noted that patient had a Birch elsewhere.      At the time when she saw Dr. Ziegler she was going through 3-4 pads per day.  She notes that now she is up to 7-8 pads per day that are drenched.  She sometimes will have to change her clothes several times during the day.  It has been going on for years, but urinary leakage has been significantly bad over the last 3 years.    Patient endorses urgency and when standing will lose her bladder.  She is on Lasix and does have peripheral edema.  Patient denies any hematuria or dysuria.  Patient can just go to the bathroom and start leaking.  Some of her urinary incontinence is insensate in nature.  She does note that she met with Dr. Jiménez, at [facility] in 2020, and she was diagnosed with overactivity of the bladder.  It appears that she had been started on Premarin.  She previously had tried and Oxytrol patch with Dr. Ziegler.    Patient has an ostomy.  She denies issues with her bowel movements since this is been placed.  She has nocturia 0-1 times.  Patient did trial pelvic floor physical therapy exercises and sessions at the recommendation of the [facility] provider.  She has continued to try to complete these exercises.    Fluid intake includes water, occasional ICE water, and  juice.    The following portions of the patient's history were reviewed and updated as appropriate: allergies, current medications, past family history, past medical history, past social history, past surgical history and problem list.     REVIEW OF SYSTEMS   Review of Systems   Constitutional: Negative for chills and fever.   Respiratory: Negative for shortness of breath.    Cardiovascular: Negative for chest pain.   Gastrointestinal: Negative for nausea and vomiting.   Genitourinary: Positive for enuresis, frequency and urgency. Negative for hematuria.      Per HPI.     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)     Anemia due to blood loss, acute     ACP (advance care planning)     Postsurgical hypothyroidism     Type 2 diabetes, HbA1c goal < 7% (H)     Gastroparesis     Papillary carcinoma of thyroid (H)     Alopecia     Pulmonary nodule     Esophageal reflux     Dermatitis     Acne rosacea     Diabetic gastroparesis (H)     Poliomyelitis     Left knee pain     Carotid stenosis     Right shoulder pain     Type 2 diabetes mellitus with other specified complication (H)     Insufficiency, arterial, peripheral (H)     Allergic dermatitis due to other chemical product     Normocytic anemia     Morbid obesity (H)     Mild major depression (H)     CKD (chronic kidney disease) stage 3, GFR 30-59 ml/min (H)     Renal artery stenosis (H)     Neuropathic pain     Mild major depression (H)     Venous insufficiency     Avascular necrosis of bone of hip, left (H)     Contact dermatitis     Diabetic nephropathy associated with type 2 diabetes mellitus (H)     DVT of upper extremity (deep vein thrombosis) (H)     Dysphagia     Edema of lower extremity     History of  colonic polyps     History of pulmonary embolus (PE)     Iron deficiency anemia     Inguinal pain     Moderate protein-calorie malnutrition (H)     Osteoarthritis of left hip     Primary insomnia     Right lower quadrant pain     Status post total shoulder arthroplasty, left     Surgical aftercare, musculoskeletal system     Vitamin D deficiency      Past Medical History:   Diagnosis Date     Abdominal adhesions 1984, 96,99    s/p lysis     Abdominal adhesions      Acne rosacea      Allergic rhinitis      Allergic rhinitis, cause unspecified      Alopecia      Anemia      CAD (coronary artery disease)      Carotid stenosis      CKD (chronic kidney disease) stage 2, GFR 60-89 ml/min      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)      CPAP (continuous positive airway pressure) dependence      Depression      Diabetic gastroparesis (H)      Diet-controlled type 2 diabetes mellitus (H)      DM2 (diabetes mellitus, type 2) (H)      DVT (deep venous thrombosis) (H)      DVT of axillary vein, acute right (H)      Fibromyalgia      Gastro-oesophageal reflux disease      GERD (gastroesophageal reflux disease)      Hernia of unspecified site of abdominal cavity without mention of obstruction or gangrene     bilateral     Hernia, abdominal      History of blood transfusion     10/ 1980     History of thrombophlebitis      HTN (hypertension)      HTN (hypertension)      Hypertriglyceridemia      Hypertriglyceridemia      Hypokalemia      Hypothyroidism      Mumps      Obstructive sleep apnea      ANNETTE (obstructive sleep apnea)      ANNETTE on CPAP      Osteoarthritis of glenohumeral joint      Papillary carcinoma of thyroid (H)     s/p thyroidectomy - Ruegemer     Papillary carcinoma of thyroid (H)      PE (pulmonary embolism)      Poliomyelitis     poor circulation right leg     Poliomyelitis      Postsurgical hypothyroidism     s/p papillary thryoid cancer - Ruegemer     Pulmonary embolism (H)      Pulmonary embolus (H)      Pulmonary nodule       Rosacea      S/P carpal tunnel release     bilateral     S/P hardware removal 01/2014    still with lingering foot pain     S/P shoulder surgery     bilateral     Septic joint (H)     right knee     Septic joint of right knee joint (H)      Venous insufficiency      Venous insufficiency      Venous thrombosis 1999    right axillary vein      Past Surgical History:   Procedure Laterality Date     AMPUTATE TOE(S)  03/15/2012    Procedure:AMPUTATE TOE(S); Surgeon:RUBEN MOSES; Location: OR     AMPUTATE TOE(S)       APPENDECTOMY  1972     APPENDECTOMY       ARTHRODESIS FOOT  03/15/2012    Procedure:ARTHRODESIS FOOT; RIGHT TRIPLE ARTHRODESIS, FIFTH TOE AMPUTATION, LATERAL LIGAMENT RECONSTRUCTION, TENDON TRANSFER AND RELEASE [MINI C-ARM, ARTHREX 4.5 AND 6.7 STAPLES, BIOCOMPOSITE TENODESIS SCREWS]; Surgeon:RUBEN MOSES; Location: OR     ARTHRODESIS FOOT Right     Right foot triple arthrodesis and removal of hardware     ARTHROSCOPY SHOULDER  06/25/2015    REVISION SUBACROMIAL DECOMPRESSION, EXCISION OF GANGLION CYST, DEBRIDEMENT AND EXCISION OF THE GLENOHUMERAL JOINT GANGLION CYST, CORACOID DECOMPRESSION, POSSIBLE SUBSCAPULARIS REPAIR AND OPEN SUBSCAPULARIS BICEP     ARTHROSCOPY SHOULDER ROTATOR CUFF REPAIR       BIOPSY  Thyroid 2002     BLADDER SURGERY       BREAST SURGERY  Biopsy     CHOLECYSTECTOMY       CHOLECYSTECTOMY       COLONOSCOPY  2018     COLOSTOMY  02/07/2012    Procedure:COLOSTOMY; CREATION OF SIGMOID COLOSTOMY AND EXTENSIVE  LYSIS OF ADHESIONS; Surgeon:MONTSERRAT BENDER; Location: OR     COLOSTOMY       CYSTOSCOPY       EYE SURGERY       GENITOURINARY SURGERY  1999     GI SURGERY      weakened rectal sphincter with artificial stimulator     HERNIA REPAIR  1976     HYSTERECTOMY TOTAL ABDOMINAL       LAPAROTOMY, LYSIS ADHESIONS, COMBINED  02/07/2012    Procedure:COMBINED LAPAROTOMY, LYSIS ADHESIONS; Surgeon:MONTSERRAT BENDER; Location: OR     RELEASE CARPAL TUNNEL        RELEASE TENDON FOOT  03/15/2012    Procedure:RELEASE TENDON FOOT; Surgeon:SUKHDEEP METZ; Location:[facility] OR     REMOVE HARDWARE FOOT  12/13/2012    Procedure: REMOVE HARDWARE FOOT;  RIGHT FOOT REMOVAL OF HARDWARE;  Surgeon: Sukhdeep Metz MD;  Location: [facility] OR     SHOULDER SURGERY  11/12/2020    LEFT SHOULDER HEMIARHTROPLASTY, BICEP TENODESIS     SOFT TISSUE SURGERY  2018, 2020     TENDON RELEASE      foot     THYROIDECTOMY       ZZC FREEING BOWEL ADHESION,ENTEROLYSIS      1986, 1996, 1999     ZZC NONSPECIFIC PROCEDURE      throidectomy     ZZC TOTAL ABDOM HYSTERECTOMY  1980    + BSO      Social History:   .  Never smoker.     Objective       PHYSICAL EXAM   /74   Ht 1.524 m (5')   Wt 95.7 kg (211 lb)   BMI 41.21 kg/m     Physical Exam  Constitutional:       Appearance: Normal appearance.   HENT:      Head: Normocephalic.   Eyes:      General: No scleral icterus.  Pulmonary:      Effort: Pulmonary effort is normal.   Abdominal:      Comments: Left sided colostomy.   Genitourinary:     Comments: Normal intact perineal sensation bilaterally.  Internal labia have bilateral erythema and slough.  Normal-appearing external labia.  Findings of bulging near the urethra.  Evidence of grade discharge.  Kegel's 1 out of 5, weak.  Right sided levator tenderness.  No stress incontinence elicited with cough or Valsalva on examination.  Slight hypermobility of the urethra.  Musculoskeletal:      Cervical back: Normal range of motion.      Comments: Ambulates with a walker.     Skin:     General: Skin is warm and dry.   Neurological:      General: No focal deficit present.      Mental Status: She is alert and oriented to person, place, and time.   Psychiatric:         Mood and Affect: Mood normal.         Behavior: Behavior normal.          LABORATORY   Recent Labs   Lab Test 10/12/22  1219   COLOR Yellow   APPEARANCE Clear   URINEGLC Negative   URINEBILI Negative   URINEKETONE Negative   SG 1.025    UBLD Negative   URINEPH 5.5   PROTEIN Negative   UROBILINOGEN 0.2   NITRITE Negative   LEUKEST Trace*   RBCU 0-2   WBCU 0-5     Patient missed the hat for UA.    TESTING    PVR: 35 mL    Assessment & Plan    1. Mixed incontinence    2. Urinary incontinence without sensory awareness    3. Vaginal discharge        I had the pleasure today of meeting with Ms. Hodgson to discuss her urinary incontinence.  She has previously had a surgical intervention as well as bulking agents.  She has worsening urinary incontinence and notes at sometimes there is urgency and sometimes it is insensate.  Based upon her description, I am not completely certain as to if it is urge or stress predominant.  Patient has weak pelvic floor on examination.  She also has evidence of pelvic tension.    We discussed that we typically would treat urge incontinence first in a mixed incontinence picture of urge and stress incontinence. Typical treatments for urge incontinence include avoiding bladder irritants, biofeedback bladder retraining, overactive bladder medications such as oxybutynin, posterior tibial nerve stimulation (PTNS), Botox (which would require learning to   perform clean intermittent catheterization and place it works too well), and InterStim. We typically go through this in a stepwise fashion.     Possible side effects with overactive bladder medications that are anticholinergic medication include dry eyes, dry mouth, constipation, difficulties with sweating, and possible mental fogginess. These medications typically take 3-4 weeks to work. Side effects will typically show up prior to this. Myrbetriq is not in anticholinergic medication, but it is rather extensive even after insurance. Most common side effect with this is hypertension.     We discussed PTNS. This includes placement of needles in the posterior tibial nerve. This is once a week for 12 weeks. Patient does need to make sure they do not miss more than 1 session, or they  will need to start over. It is 30 minutes at a time. We do not expect improvement until at least week 5-6, and some patients take longer. It does not work for all patients.     Botox is injected into the bladder. It typically takes 2 weeks before this takes effect. Average length of time per treatment that patients get relief for is approximately 6 months. Some patients get more lasting relief. We also discussed briefly the implantable device InterStim.     We also briefly discussed stress incontinence treatment options including Kegel exercises/biofeedback bladder retraining,Estim, pessary, Poise Impressa, sling, and bulking agents.     We also discussed the role of weight loss for urgency and stress incontinence. This typically improves both of these.    After discussion with the patient, she would like to trial the following:    -We will plan on referral to pelvic floor physical therapy for a refresher exercises as it has been sometime in her pelvic floor still rather weak on examination.    -We will plan on trialing overactive bladder medication in the form of Detrol 4 mg once daily.  Discussed possible adverse effects and the amount of time it would take to see some results.    -If patient continues to have issues, would consider cystoscopy and possible urodynamic study with Dr. Zendejas given slightly unclear description of urinary incontinence.    -Based upon vaginal discharge, would recommend wet prep to look for infection.    -Follow-up with repeat exam in approximately 3 to 4 months.    Signed by:     Terra Bridges PA-C 12/28/2022 1:58 PM

## 2022-12-28 NOTE — NURSING NOTE
Chief Complaint   Patient presents with     Incontinence     Pt. Reports leakage of urine, inability to hold it when she needs to go to the bathroom.  Some will start to flow when she stands up to go to the bathroom, or as she starts to sit down on the toilet.   Did some pelvic floor therapy prior to the Covid pandemic, attended 7-8 sessions.    PVR: 35 mL by bladder scan.      Kinza Barksdale, EMT

## 2022-12-30 ENCOUNTER — OFFICE VISIT (OUTPATIENT)
Dept: FAMILY MEDICINE | Facility: CLINIC | Age: 77
End: 2022-12-30
Payer: MEDICARE

## 2022-12-30 VITALS
OXYGEN SATURATION: 100 % | WEIGHT: 212 LBS | SYSTOLIC BLOOD PRESSURE: 132 MMHG | RESPIRATION RATE: 18 BRPM | HEART RATE: 66 BPM | DIASTOLIC BLOOD PRESSURE: 74 MMHG | TEMPERATURE: 97.5 F | BODY MASS INDEX: 41.4 KG/M2

## 2022-12-30 DIAGNOSIS — E11.69 TYPE 2 DIABETES MELLITUS WITH OTHER SPECIFIED COMPLICATION, WITH LONG-TERM CURRENT USE OF INSULIN (H): ICD-10-CM

## 2022-12-30 DIAGNOSIS — Z79.4 TYPE 2 DIABETES MELLITUS WITH OTHER SPECIFIED COMPLICATION, WITH LONG-TERM CURRENT USE OF INSULIN (H): ICD-10-CM

## 2022-12-30 DIAGNOSIS — L03.119 CELLULITIS AND ABSCESS OF LEG: Primary | ICD-10-CM

## 2022-12-30 DIAGNOSIS — L02.419 CELLULITIS AND ABSCESS OF LEG: Primary | ICD-10-CM

## 2022-12-30 DIAGNOSIS — Z79.899 ENCOUNTER FOR LONG-TERM (CURRENT) USE OF MEDICATIONS: ICD-10-CM

## 2022-12-30 PROBLEM — Z47.89 SURGICAL AFTERCARE, MUSCULOSKELETAL SYSTEM: Status: ACTIVE | Noted: 2018-04-11

## 2022-12-30 PROBLEM — F51.01 PRIMARY INSOMNIA: Status: ACTIVE | Noted: 2020-11-24

## 2022-12-30 PROBLEM — Z86.711 HISTORY OF PULMONARY EMBOLUS (PE): Status: ACTIVE | Noted: 2018-04-04

## 2022-12-30 PROBLEM — E11.21 DIABETIC NEPHROPATHY ASSOCIATED WITH TYPE 2 DIABETES MELLITUS (H): Status: ACTIVE | Noted: 2020-09-02

## 2022-12-30 PROBLEM — D50.9 IRON DEFICIENCY ANEMIA: Status: ACTIVE | Noted: 2018-05-31

## 2022-12-30 PROBLEM — Z86.0100 HISTORY OF COLONIC POLYPS: Status: ACTIVE | Noted: 2018-05-31

## 2022-12-30 PROBLEM — Z96.612 STATUS POST TOTAL SHOULDER ARTHROPLASTY, LEFT: Status: ACTIVE | Noted: 2020-11-21

## 2022-12-30 PROBLEM — M16.12 OSTEOARTHRITIS OF LEFT HIP: Status: ACTIVE | Noted: 2020-05-14

## 2022-12-30 PROBLEM — R10.30 INGUINAL PAIN: Status: ACTIVE | Noted: 2018-03-20

## 2022-12-30 PROBLEM — E44.0 MODERATE PROTEIN-CALORIE MALNUTRITION (H): Status: ACTIVE | Noted: 2020-11-21

## 2022-12-30 PROBLEM — R60.0 EDEMA OF LOWER EXTREMITY: Status: ACTIVE | Noted: 2022-09-19

## 2022-12-30 PROBLEM — I82.629 DVT OF UPPER EXTREMITY (DEEP VEIN THROMBOSIS) (H): Status: ACTIVE | Noted: 2022-12-30

## 2022-12-30 PROBLEM — R10.31 RIGHT LOWER QUADRANT PAIN: Status: ACTIVE | Noted: 2018-03-20

## 2022-12-30 PROBLEM — E55.9 VITAMIN D DEFICIENCY: Status: ACTIVE | Noted: 2020-09-02

## 2022-12-30 PROBLEM — M87.052 AVASCULAR NECROSIS OF BONE OF HIP, LEFT (H): Status: ACTIVE | Noted: 2020-05-14

## 2022-12-30 PROCEDURE — 99213 OFFICE O/P EST LOW 20 MIN: CPT | Performed by: INTERNAL MEDICINE

## 2022-12-30 NOTE — PROGRESS NOTES
Raheem Cochran is a 77 year old, presenting for the following health issues:  Follow Up (1 wk f/u)      History of Present Illness       Reason for visit:  Swelling in both legs, cellulitis in left leg, review lab result for kidney function    She eats 2-3 servings of fruits and vegetables daily.She consumes 1 sweetened beverage(s) daily.She exercises with enough effort to increase her heart rate 10 to 19 minutes per day.  She exercises with enough effort to increase her heart rate 3 or less days per week.   She is taking medications regularly.     Type 2 diabetes mellitus with other specified complication, with long-term current use of insulin (H)  Cellulitis and abscess of leg  Benign essential hypertension              Chasity Hodgson presents to the clinic today for follow up of her cellulitis.  She has been doing better with regards to keeping her legs elevated.  Still not experiencing fever.  No chills.  No heart racing.  No chest pain, no shortness of breath, no cough.  She is trying to drink more fluids and she is taking lasix 20 mg daily now.  She has now completed her last week of antibiotics.  She did have labs checked last week, and stable renal function and electrolytes.       Review of Systems   Constitutional, HEENT, cardiovascular, pulmonary, GI, , musculoskeletal, neuro, skin, endocrine and psych systems are negative, except as otherwise noted.      Objective    /74   Pulse 66   Temp 97.5  F (36.4  C) (Oral)   Resp 18   Wt 96.2 kg (212 lb)   SpO2 100%   BMI 41.40 kg/m    Body mass index is 41.4 kg/m .  Physical Exam   GENERAL: healthy, alert and no distress  EYES: Eyes grossly normal to inspection,    NECK: no adenopathy, no asymmetry   RESP: lungs clear to auscultation - no rales, rhonchi or wheezes  CV: regular rate and rhythm, normal S1 S2, no S3 or S4, no murmur, click or rub 2+ peripheral edema   ABDOMEN: soft, nontender, no hepatosplenomegaly, no masses and bowel sounds  normal  MS: no gross musculoskeletal defects noted, no edema  SKIN: improved skin erythema left side   NEURO: Normal strength and tone, mentation intact and speech normal  PSYCH: mentation appears normal, affect normal/bright    Patient Instructions   (Z79.809) Encounter for long-term (current) use of medications  Comment: Continue current medications.  No changes  Plan:     (E11.69) Type 2 diabetes mellitus with other specified complication (H)  Comment: last hemoglobin A1c was reviewed and stable at 7.0.  Continue current medications of tresiba and semaglutide  Plan:     (L03.119,  L02.419) Cellulitis and abscess of leg  Comment: slightly improved - no additional antibiotics needed.  We'll continue to monitor.  If it worsens we can re-prescribe antibiotics.  For now, continue to keep feet elevated and continue Lasix,   Plan:

## 2022-12-30 NOTE — PATIENT INSTRUCTIONS
(Z79.059) Encounter for long-term (current) use of medications  Comment: Continue current medications.  No changes  Plan:     (E11.69) Type 2 diabetes mellitus with other specified complication (H)  Comment: last hemoglobin A1c was reviewed and stable at 7.0.  Continue current medications of tresiba and semaglutide  Plan:     (L03.119,  L02.419) Cellulitis and abscess of leg  Comment: slightly improved - no additional antibiotics needed.  We'll continue to monitor.  If it worsens we can re-prescribe antibiotics.  For now, continue to keep feet elevated and continue Lasix,   Plan:

## 2023-01-06 ENCOUNTER — TELEPHONE (OUTPATIENT)
Dept: FAMILY MEDICINE | Facility: CLINIC | Age: 78
End: 2023-01-06

## 2023-01-06 ENCOUNTER — OFFICE VISIT (OUTPATIENT)
Dept: FAMILY MEDICINE | Facility: CLINIC | Age: 78
End: 2023-01-06
Payer: MEDICARE

## 2023-01-06 ENCOUNTER — HOSPITAL ENCOUNTER (OUTPATIENT)
Dept: ULTRASOUND IMAGING | Facility: CLINIC | Age: 78
Discharge: HOME | End: 2023-01-06
Attending: INTERNAL MEDICINE | Admitting: INTERNAL MEDICINE
Payer: MEDICARE

## 2023-01-06 VITALS
OXYGEN SATURATION: 100 % | HEART RATE: 63 BPM | TEMPERATURE: 96.9 F | DIASTOLIC BLOOD PRESSURE: 81 MMHG | HEIGHT: 60 IN | WEIGHT: 213.9 LBS | SYSTOLIC BLOOD PRESSURE: 173 MMHG | RESPIRATION RATE: 22 BRPM | BODY MASS INDEX: 41.99 KG/M2

## 2023-01-06 DIAGNOSIS — L02.419 CELLULITIS AND ABSCESS OF LEG: ICD-10-CM

## 2023-01-06 DIAGNOSIS — N18.30 STAGE 3 CHRONIC KIDNEY DISEASE, UNSPECIFIED WHETHER STAGE 3A OR 3B CKD (H): ICD-10-CM

## 2023-01-06 DIAGNOSIS — R22.42 LOCALIZED SWELLING, MASS AND LUMP, LEFT LOWER LIMB: ICD-10-CM

## 2023-01-06 DIAGNOSIS — L03.119 CELLULITIS AND ABSCESS OF LEG: ICD-10-CM

## 2023-01-06 DIAGNOSIS — L02.419 CELLULITIS AND ABSCESS OF LEG: Primary | ICD-10-CM

## 2023-01-06 DIAGNOSIS — F32.0 MILD MAJOR DEPRESSION (H): ICD-10-CM

## 2023-01-06 DIAGNOSIS — I89.0 LYMPHEDEMA: ICD-10-CM

## 2023-01-06 DIAGNOSIS — Z93.3 COLOSTOMY IN PLACE (H): ICD-10-CM

## 2023-01-06 DIAGNOSIS — E11.21 DIABETIC NEPHROPATHY ASSOCIATED WITH TYPE 2 DIABETES MELLITUS (H): ICD-10-CM

## 2023-01-06 DIAGNOSIS — I82.629 ACUTE DEEP VEIN THROMBOSIS (DVT) OF UPPER EXTREMITY, UNSPECIFIED LATERALITY, UNSPECIFIED VEIN (H): ICD-10-CM

## 2023-01-06 DIAGNOSIS — L03.119 CELLULITIS AND ABSCESS OF LEG: Primary | ICD-10-CM

## 2023-01-06 LAB
ANION GAP SERPL CALCULATED.3IONS-SCNC: 13 MMOL/L (ref 7–15)
BUN SERPL-MCNC: 26.5 MG/DL (ref 8–23)
CALCIUM SERPL-MCNC: 9.2 MG/DL (ref 8.8–10.2)
CHLORIDE SERPL-SCNC: 108 MMOL/L (ref 98–107)
CREAT SERPL-MCNC: 1.15 MG/DL (ref 0.51–0.95)
DEPRECATED HCO3 PLAS-SCNC: 22 MMOL/L (ref 22–29)
ERYTHROCYTE [DISTWIDTH] IN BLOOD BY AUTOMATED COUNT: 12.5 % (ref 10–15)
GFR SERPL CREATININE-BSD FRML MDRD: 49 ML/MIN/1.73M2
GLUCOSE SERPL-MCNC: 138 MG/DL (ref 70–99)
HCT VFR BLD AUTO: 47.9 % (ref 35–47)
HGB BLD-MCNC: 15.5 G/DL (ref 11.7–15.7)
MCH RBC QN AUTO: 29.6 PG (ref 26.5–33)
MCHC RBC AUTO-ENTMCNC: 32.4 G/DL (ref 31.5–36.5)
MCV RBC AUTO: 92 FL (ref 78–100)
PLATELET # BLD AUTO: 143 10E3/UL (ref 150–450)
POTASSIUM SERPL-SCNC: 4.6 MMOL/L (ref 3.4–5.3)
RBC # BLD AUTO: 5.23 10E6/UL (ref 3.8–5.2)
SODIUM SERPL-SCNC: 143 MMOL/L (ref 136–145)
WBC # BLD AUTO: 5.1 10E3/UL (ref 4–11)

## 2023-01-06 PROCEDURE — 93971 EXTREMITY STUDY: CPT | Mod: LT

## 2023-01-06 PROCEDURE — 36415 COLL VENOUS BLD VENIPUNCTURE: CPT | Performed by: INTERNAL MEDICINE

## 2023-01-06 PROCEDURE — 80048 BASIC METABOLIC PNL TOTAL CA: CPT | Performed by: INTERNAL MEDICINE

## 2023-01-06 PROCEDURE — 85027 COMPLETE CBC AUTOMATED: CPT | Performed by: INTERNAL MEDICINE

## 2023-01-06 PROCEDURE — 99214 OFFICE O/P EST MOD 30 MIN: CPT | Performed by: INTERNAL MEDICINE

## 2023-01-06 RX ORDER — CEPHALEXIN 500 MG/1
500 CAPSULE ORAL 4 TIMES DAILY
Qty: 28 CAPSULE | Refills: 0 | Status: SHIPPED | OUTPATIENT
Start: 2023-01-06 | End: 2023-01-13

## 2023-01-06 RX ORDER — DOXYCYCLINE 100 MG/1
100 CAPSULE ORAL 2 TIMES DAILY
Qty: 14 CAPSULE | Refills: 0 | Status: SHIPPED | OUTPATIENT
Start: 2023-01-06 | End: 2023-01-13

## 2023-01-06 ASSESSMENT — PAIN SCALES - GENERAL: PAINLEVEL: EXTREME PAIN (8)

## 2023-01-06 NOTE — PATIENT INSTRUCTIONS
(L03.119,  L02.419) Cellulitis and abscess of leg  (primary encounter diagnosis)  Comment: We will plan to resume both keflex and doxycycline today and recheck labs today.  Also recommend consult in infectious disease and will recheck ultrasound of the left leg [facility] phone #[phone]   Plan: cephALEXin (KEFLEX) 500 MG capsule, doxycycline        hyclate (VIBRAMYCIN) 100 MG capsule            (E11.21) Diabetic nephropathy associated with type 2 diabetes mellitus (H)  Comment: Recent hemoglobin A1c reviewed and stable  Plan:     (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)  Comment: stable  Plan:     (F32.0) Mild major depression (H)  Comment: doing relatively well  Plan:     (I82.629) Acute deep vein thrombosis (DVT) of upper extremity, unspecified laterality, unspecified vein (H)  Comment: Continue aspirin   Plan:     (Z68.41) BMI 40.0-44.9, adult (H)  Comment: Continue to work on diet and weight loss - referral to bariatric surgery placed  Plan:     (N18.30) Stage 3 chronic kidney disease, unspecified whether stage 3a or 3b CKD (H)  Comment: check basic metabolic panel   Plan:     (I89.0) Lymphedema  Comment: Referral back to lymphedema treatment today.  Watch sodium more closely.  Check labs to make sure sodium total is not more than 2,000 mg/daily  Plan: Lymphedema Therapy Referral

## 2023-01-06 NOTE — PROGRESS NOTES
Raheem Cochran is a 77 year old, presenting for the following health issues:  Hypertension and Gastrointestinal Problem (Vomiting on Jan 1st )      History of Present Illness       Hypertension: She presents for follow up of hypertension.  She does check blood pressure  regularly outside of the clinic. Outside blood pressures have been over 140/90. She follows a low salt diet.         Diabetic nephropathy associated with type 2 diabetes mellitus (H)   Chasity Hodgson presents to the clinic today for follow up of diabetes, hypertension, and chronic kidney disease.  She is doing well with continued lasix.   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  Ostomy in place  Mild major depression (H)  History of deep vein thrombosis (DVT) of upper extremity, unspecified laterality, unspecified vein (H)   Continues on aspirin.  No acute concerns.    BMI 40.0-44.9, adult (H)   Weight remains stable.    Stage 3 chronic kidney disease, unspecified whether stage 3a or 3b CKD (H)  Cellulitis and abscess of leg    She still has some erythema in left leg, but notes stable.  She finds improvement in leg when she keeps legs elevated.  She is trying to watch her sodium.  She has not seen vascular clinic.  She does go out to eat periodically and will eat higher sodium food.  No fevers, no chills.  Had a large sodium load yesterday.        Review of Systems   Constitutional, HEENT, cardiovascular, pulmonary, GI, , musculoskeletal, neuro, skin, endocrine and psych systems are negative, except as otherwise noted.      Objective    BP (!) 189/79 (BP Location: Right arm, Patient Position: Sitting, Cuff Size: Adult Regular)   Pulse 63   Temp 96.9  F (36.1  C) (Temporal)   Resp 22   Ht 1.524 m (5')   Wt 97 kg (213 lb 14.4 oz)   SpO2 100%   BMI 41.77 kg/m    Body mass index is 41.77 kg/m .  Physical Exam   GENERAL: healthy, alert and no distress  EYES: Eyes grossly normal to inspection,    NECK: no  adenopathy, no asymmetry, masses, or scars and thyroid normal to palpation  RESP: lungs clear to auscultation - no rales, rhonchi or wheezes  CV: regular rate and rhythm, normal S1 S2, no S3 or S4, no murmur, 2+ edema  ABDOMEN: obese, + ostomy, soft, nontender, no hepatosplenomegaly, no masses and bowel sounds normal  MS: no gross musculoskeletal defects noted,    SKIN: worsening erythema and edema left shin circumferential   NEURO: Normal strength and tone,    PSYCH: mentation appears normal, affect normal/bright    Recent Results (from the past 240 hour(s))   MEASURE POST-VOID RESIDUAL URINE/BLADDER CAPACITY, US NON-IMAGING    Collection Time: 12/28/22 12:00 AM   Result Value Ref Range    Residual Volume (RV) (External) 35    Wet prep    Collection Time: 12/28/22  2:42 PM    Specimen: Vagina; Swab   Result Value Ref Range    Trichomonas Absent Absent    Yeast Absent Absent    Clue Cells Absent Absent    WBCs/high power field 1+ (A) None   CBC with platelets    Collection Time: 01/06/23 12:15 PM   Result Value Ref Range    WBC Count 5.1 4.0 - 11.0 10e3/uL    RBC Count 5.23 (H) 3.80 - 5.20 10e6/uL    Hemoglobin 15.5 11.7 - 15.7 g/dL    Hematocrit 47.9 (H) 35.0 - 47.0 %    MCV 92 78 - 100 fL    MCH 29.6 26.5 - 33.0 pg    MCHC 32.4 31.5 - 36.5 g/dL    RDW 12.5 10.0 - 15.0 %    Platelet Count 143 (L) 150 - 450 10e3/uL         Patient Instructions   (L03.119,  L02.419) Cellulitis and abscess of leg  (primary encounter diagnosis)  Comment: We will plan to resume both keflex and doxycycline today and recheck labs today.  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  Watch sodium more closely.  Check labs to make sure sodium total is not more than 2,000 mg/daily  Plan: Lymphedema Therapy Referral                    30 minutes spent with patient.  Over 50% of time counseling

## 2023-01-07 NOTE — RESULT ENCOUNTER NOTE
Gerard Gaspar,    I have had the opportunity to review your recent results and an interpretation is as follows:  As we discussed, no evidence of clot     Sincerely,  Shola Benoit MD

## 2023-01-07 NOTE — RESULT ENCOUNTER NOTE
Gerard Gaspar,    I have had the opportunity to review your recent results and an interpretation is as follows:  Your complete blood counts shows stable white blood cell count, hemoglobin and slight decrease platelets  Your basic metabolic panel shows stable renal function and electrolytes      Sincerely,  Shola Benoit MD

## 2023-01-10 ASSESSMENT — ENCOUNTER SYMPTOMS
FEVER: 0
CHILLS: 0
VOMITING: 0
HEMATURIA: 0
NAUSEA: 0
FREQUENCY: 1
SHORTNESS OF BREATH: 0

## 2023-01-13 ENCOUNTER — OFFICE VISIT (OUTPATIENT)
Dept: FAMILY MEDICINE | Facility: CLINIC | Age: 78
End: 2023-01-13
Payer: MEDICARE

## 2023-01-13 VITALS
RESPIRATION RATE: 16 BRPM | BODY MASS INDEX: 41.56 KG/M2 | HEIGHT: 60 IN | HEART RATE: 70 BPM | SYSTOLIC BLOOD PRESSURE: 147 MMHG | OXYGEN SATURATION: 100 % | WEIGHT: 211.7 LBS | DIASTOLIC BLOOD PRESSURE: 74 MMHG | TEMPERATURE: 96.8 F

## 2023-01-13 DIAGNOSIS — F32.0 MILD MAJOR DEPRESSION (H): ICD-10-CM

## 2023-01-13 DIAGNOSIS — Z93.3 COLOSTOMY IN PLACE (H): ICD-10-CM

## 2023-01-13 DIAGNOSIS — N18.30 STAGE 3 CHRONIC KIDNEY DISEASE, UNSPECIFIED WHETHER STAGE 3A OR 3B CKD (H): ICD-10-CM

## 2023-01-13 DIAGNOSIS — E44.0 MODERATE PROTEIN-CALORIE MALNUTRITION (H): ICD-10-CM

## 2023-01-13 DIAGNOSIS — L02.419 CELLULITIS AND ABSCESS OF LEG: ICD-10-CM

## 2023-01-13 DIAGNOSIS — I82.629 ACUTE DEEP VEIN THROMBOSIS (DVT) OF UPPER EXTREMITY, UNSPECIFIED LATERALITY, UNSPECIFIED VEIN (H): ICD-10-CM

## 2023-01-13 DIAGNOSIS — L03.119 CELLULITIS AND ABSCESS OF LEG: ICD-10-CM

## 2023-01-13 DIAGNOSIS — I89.0 LYMPHEDEMA: Primary | ICD-10-CM

## 2023-01-13 LAB
ANION GAP SERPL CALCULATED.3IONS-SCNC: 15 MMOL/L (ref 7–15)
BUN SERPL-MCNC: 35.9 MG/DL (ref 8–23)
CALCIUM SERPL-MCNC: 9.4 MG/DL (ref 8.8–10.2)
CHLORIDE SERPL-SCNC: 105 MMOL/L (ref 98–107)
CREAT SERPL-MCNC: 1.54 MG/DL (ref 0.51–0.95)
DEPRECATED HCO3 PLAS-SCNC: 22 MMOL/L (ref 22–29)
ERYTHROCYTE [DISTWIDTH] IN BLOOD BY AUTOMATED COUNT: 12.6 % (ref 10–15)
GFR SERPL CREATININE-BSD FRML MDRD: 35 ML/MIN/1.73M2
GLUCOSE SERPL-MCNC: 135 MG/DL (ref 70–99)
HCT VFR BLD AUTO: 37 % (ref 35–47)
HGB BLD-MCNC: 11.9 G/DL (ref 11.7–15.7)
MCH RBC QN AUTO: 29.8 PG (ref 26.5–33)
MCHC RBC AUTO-ENTMCNC: 32.2 G/DL (ref 31.5–36.5)
MCV RBC AUTO: 93 FL (ref 78–100)
PLATELET # BLD AUTO: 212 10E3/UL (ref 150–450)
POTASSIUM SERPL-SCNC: 4.9 MMOL/L (ref 3.4–5.3)
RBC # BLD AUTO: 4 10E6/UL (ref 3.8–5.2)
SODIUM SERPL-SCNC: 142 MMOL/L (ref 136–145)
WBC # BLD AUTO: 8.1 10E3/UL (ref 4–11)

## 2023-01-13 PROCEDURE — 36415 COLL VENOUS BLD VENIPUNCTURE: CPT | Performed by: INTERNAL MEDICINE

## 2023-01-13 PROCEDURE — 99214 OFFICE O/P EST MOD 30 MIN: CPT | Performed by: INTERNAL MEDICINE

## 2023-01-13 PROCEDURE — 80048 BASIC METABOLIC PNL TOTAL CA: CPT | Performed by: INTERNAL MEDICINE

## 2023-01-13 PROCEDURE — 85027 COMPLETE CBC AUTOMATED: CPT | Performed by: INTERNAL MEDICINE

## 2023-01-13 RX ORDER — CEPHALEXIN 500 MG/1
500 CAPSULE ORAL 4 TIMES DAILY
Qty: 28 CAPSULE | Refills: 0 | Status: SHIPPED | OUTPATIENT
Start: 2023-01-13 | End: 2023-03-21

## 2023-01-13 RX ORDER — DOXYCYCLINE 100 MG/1
100 CAPSULE ORAL 2 TIMES DAILY
Qty: 14 CAPSULE | Refills: 0 | Status: SHIPPED | OUTPATIENT
Start: 2023-01-13 | End: 2023-03-21

## 2023-01-13 ASSESSMENT — PAIN SCALES - GENERAL: PAINLEVEL: SEVERE PAIN (6)

## 2023-01-13 NOTE — PROGRESS NOTES
Raheem Cochran is a 77 year old, presenting for the following health issues:    Follow Up (Recheck  bilateral Lower leg cellulitis)      HPI     Cellulitis  Lymphedema   Chasity Hodgson has continued lasix 20 mg daily as needed.  She did complete 1 week of antibiotics and still with erythema and pain in the left leg.  She did not have any side effects from the antibiotics.  She has had some stomach discomfort.  Occasional chills.  Noted rise in both hemoglobin and hematocrit last week.  She has no fevers currently.  She does have an appointment scheduled with lymphedema clinic and also scheduled to see infectious disease next month.    Review of Systems   Constitutional, HEENT, cardiovascular, pulmonary, GI, , musculoskeletal, neuro, skin, endocrine and psych systems are negative, except as otherwise noted.      Objective    BP (!) 147/74 (BP Location: Left arm, Patient Position: Sitting, Cuff Size: Adult Large)   Pulse 70   Temp 96.8  F (36  C) (Tympanic)   Resp 16   Ht 1.524 m (5')   Wt 96 kg (211 lb 11.2 oz)   SpO2 100%   BMI 41.34 kg/m    Body mass index is 41.34 kg/m .  Physical Exam   GENERAL: healthy, alert and no distress  EYES: Eyes grossly normal to inspection,    NECK: no adenopathy, no asymmetry, masses   RESP: lungs clear to auscultation - no rales, rhonchi or wheezes  CV: regular rate and rhythm, normal S1 S2, no S3 or S4, no murmur   ABDOMEN: soft, nontender, no hepatosplenomegaly   MS: no gross musculoskeletal defects noted, 2+ edema  SKIN: see below  NEURO: Normal strength and tone, mentation intact and speech normal  PSYCH: mentation appears normal, affect normal/bright        Recent Results (from the past 240 hour(s))   Basic metabolic panel  (Ca, Cl, CO2, Creat, Gluc, K, Na, BUN)    Collection Time: 01/06/23 12:15 PM   Result Value Ref Range    Sodium 143 136 - 145 mmol/L    Potassium 4.6 3.4 - 5.3 mmol/L    Chloride 108 (H) 98 - 107 mmol/L    Carbon Dioxide (CO2) 22 22 - 29  mmol/L    Anion Gap 13 7 - 15 mmol/L    Urea Nitrogen 26.5 (H) 8.0 - 23.0 mg/dL    Creatinine 1.15 (H) 0.51 - 0.95 mg/dL    Calcium 9.2 8.8 - 10.2 mg/dL    Glucose 138 (H) 70 - 99 mg/dL    GFR Estimate 49 (L) >60 mL/min/1.73m2   CBC with platelets    Collection Time: 01/06/23 12:15 PM   Result Value Ref Range    WBC Count 5.1 4.0 - 11.0 10e3/uL    RBC Count 5.23 (H) 3.80 - 5.20 10e6/uL    Hemoglobin 15.5 11.7 - 15.7 g/dL    Hematocrit 47.9 (H) 35.0 - 47.0 %    MCV 92 78 - 100 fL    MCH 29.6 26.5 - 33.0 pg    MCHC 32.4 31.5 - 36.5 g/dL    RDW 12.5 10.0 - 15.0 %    Platelet Count 143 (L) 150 - 450 10e3/uL   CBC with platelets    Collection Time: 01/13/23  1:41 PM   Result Value Ref Range    WBC Count 8.1 4.0 - 11.0 10e3/uL    RBC Count 4.00 3.80 - 5.20 10e6/uL    Hemoglobin 11.9 11.7 - 15.7 g/dL    Hematocrit 37.0 35.0 - 47.0 %    MCV 93 78 - 100 fL    MCH 29.8 26.5 - 33.0 pg    MCHC 32.2 31.5 - 36.5 g/dL    RDW 12.6 10.0 - 15.0 %    Platelet Count 212 150 - 450 10e3/uL         Patient Instructions   (I89.0) Lymphedema  (primary encounter diagnosis)  Comment: We will plan for lymphedema next week - would recommend increase lasix to 4-5 times per week  Plan:     (L03.119,  L02.419) Cellulitis and abscess of leg  Comment: OK to resume antibiotics   Plan: cephALEXin (KEFLEX) 500 MG capsule, doxycycline        hyclate (VIBRAMYCIN) 100 MG capsule            (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)  Comment: Noted stable ostomy output  Plan:     (E44.0) Moderate protein-calorie malnutrition (H)  Comment: Continue healthy diet  Plan:     (F32.0) Mild major depression (H)  Comment: doing welll  Plan:     (I82.629) Acute deep vein thrombosis (DVT) of upper extremity, unspecified laterality, unspecified vein (H)  Comment: Noted ultrasound did not show deep vein thrombosis when we checked this last week  Plan:     (Z68.41) BMI 40.0-44.9, adult (H)  Comment: Continue to say healthy and eat regularly   Plan:     (N18.30) Stage 3 chronic  kidney disease, unspecified whether stage 3a or 3b CKD (H)  Comment: Check labs again next week  Plan:

## 2023-01-13 NOTE — PATIENT INSTRUCTIONS
(I89.0) Lymphedema  (primary encounter diagnosis)  Comment: We will plan for lymphedema next week - would recommend increase lasix to 4-5 times per week  Plan:     (L03.119,  L02.419) Cellulitis and abscess of leg  Comment: OK to resume antibiotics x 1 additional week then stop and plan to follow up in infectious disease and lymphedema clinic   Plan: cephALEXin (KEFLEX) 500 MG capsule, doxycycline        hyclate (VIBRAMYCIN) 100 MG capsule            (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)  Comment: Noted stable ostomy output  Plan:     (E44.0) Moderate protein-calorie malnutrition (H)  Comment: Continue healthy diet  Plan:     (F32.0) Mild major depression (H)  Comment: doing welll  Plan:     (I82.629) Acute deep vein thrombosis (DVT) of upper extremity, unspecified laterality, unspecified vein (H)  Comment: Noted ultrasound did not show deep vein thrombosis when we checked this last week  Plan:     (Z68.41) BMI 40.0-44.9, adult (H)  Comment: Continue to say healthy and eat regularly   Plan:     (N18.30) Stage 3 chronic kidney disease, unspecified whether stage 3a or 3b CKD (H)  Comment: Check labs again next week  Plan:

## 2023-01-15 NOTE — RESULT ENCOUNTER NOTE
Gerard Gaspar,    I have had the opportunity to review your recent results and an interpretation is as follows:  Your complete blood counts shows stale white blood cell count and hemoglobin  Your basic metabolic panel shows a slight rise in both BUN and creatinine.  Continue to stay hydrated as best you can and we can recheck in 2 weeks     Sincerely,  Shola Benoit MD

## 2023-01-16 ENCOUNTER — TELEPHONE (OUTPATIENT)
Dept: FAMILY MEDICINE | Facility: CLINIC | Age: 78
End: 2023-01-16

## 2023-01-16 ENCOUNTER — TELEPHONE (OUTPATIENT)
Dept: FAMILY MEDICINE | Facility: CLINIC | Age: 78
End: 2023-01-16
Payer: MEDICARE

## 2023-01-16 NOTE — TELEPHONE ENCOUNTER
Patient calling back. Patient informed of Dr. Benoit's below response. Patient verbalized understanding and reports her appointment with lymphedema is tomorrow.     Patient will await provider's call sometime today.       Adrianne Murillo RN BSN MSN  [facility]

## 2023-01-16 NOTE — TELEPHONE ENCOUNTER
General Call    Contacts       Type Contact Phone/Fax    01/16/2023 09:17 AM CST Phone (Incoming) Sammie Hodgson (Self) [phone] (M)        Reason for Call:  Pt was seen 1/13 and needs an appt per AVS    What are your questions or concerns:  Patient was told to return 1/16- pt is calling wondering if someone will call her to schedule    Date of last appointment with provider: 1/13    Could we send this information to you in [facility] or would you prefer to receive a phone call?:   Patient would prefer a phone call   Okay to leave a detailed message?: Yes at Home number on file [phone] (home)    Amber Al/George-  [facility]

## 2023-01-16 NOTE — TELEPHONE ENCOUNTER
"Patient calling again. Patient wonders if she missed a call from Dr. Benoit?       Patient also wondering if she needs an appointment this week?     From 1/13/2023 OV note: \"Return in about 3 days (around 1/16/2023).\"      Please advise if same day or next day or virtual visit time slot may be used for in clinic appointment.     Patient awaiting provider's call back.       Adrianne Murillo RN BSN MSN  [facility]      "

## 2023-01-16 NOTE — TELEPHONE ENCOUNTER
I did call and left a message on Sammie's voicemail with regards to her recent labs showing a slight elevation of her creatinine and recommended she reduce her Lasix down to 2-3 times per week.  I want her to try to increase her fluid hydration as best she possibly can.  Also ask you to see how her legs are feeling this week with continuation of the antibiotics.  I believe she does have a appoint with lymphedema today and hopefully she is able to make that happen.    If she calls back please let me know and we can try to connect sometime today    Shola Benoit MD, MD

## 2023-01-17 ENCOUNTER — HOSPITAL ENCOUNTER (OUTPATIENT)
Dept: OCCUPATIONAL THERAPY | Facility: CLINIC | Age: 78
Setting detail: THERAPIES SERIES
Discharge: HOME | End: 2023-01-17
Attending: INTERNAL MEDICINE
Payer: MEDICARE

## 2023-01-17 DIAGNOSIS — I89.0 LYMPHEDEMA: ICD-10-CM

## 2023-01-17 PROCEDURE — 97140 MANUAL THERAPY 1/> REGIONS: CPT | Mod: GO | Performed by: OCCUPATIONAL THERAPIST

## 2023-01-17 PROCEDURE — 97165 OT EVAL LOW COMPLEX 30 MIN: CPT | Mod: GO | Performed by: OCCUPATIONAL THERAPIST

## 2023-01-17 NOTE — TELEPHONE ENCOUNTER
I spoke with pt and scheduled an appointment on 01/20/2023.    Rabia Leon ,[facility]

## 2023-01-18 NOTE — PROGRESS NOTES
01/17/23 1600   Quick Adds   Quick Adds Certification   Rehab Discipline   Discipline OT   General Information   Start of care 01/17/23   Referring physician Shola Benoit   Orders Evaluate and treat as indicated   Order date 01/06/23   Medical diagnosis lymphedema   Onset of illness / date of surgery 01/06/23   Edema onset 01/06/23  (chronic for years)   Affected body parts RLE;LLE   Edema etiology Chronic Venous Insufficiency;Infection;TRACIE   Edema etiology comments Pts BLE lymphedema is mainly related to CVI. 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 Pt has histoyu arthritis and L TRACIE. History thyroid cancer, diabetes, and cellulitis infections LEs as well contributing to her LB lymphedema   Pertinent history of current problem (PT: include personal factors and/or comorbidities that impact the POC; OT: include additional occupational profile info) PMH significant for arthritis, bowel and bladder issues, thyroid cancer trated in 2004, CTS, diabetes, dizziness reported and history DVT and PE. Hernia 1996, HTN, cellulitis infections LE's, preganancy, and post polio syndrome.   Surgical / medical history reviewed Yes   Prior level of functional mobility Mod I   Prior treatment Compression garments;Elevation;MLD;Gradient compression bandaging   Community support Family / friend caregiver   Patient role / employment history Disabled;Retired   Living environment [place] [facility]   Current assistive devices   (4ww)   Fall Risk Screen   Fall screen completed by OT   Have you fallen 2 or more times in the past year? No   Have you fallen and had an injury in the past year? No   Is patient a fall risk? No   Abuse Screen (yes response referral indicated)   Feels Unsafe at Home or Work/School no   Feels Threatened by Someone no   Does Anyone Try to Keep You From Having Contact with Others or Doing Things Outside Your Home? no    Physical Signs of Abuse Present no   Patient needs abuse support services and resources No   System Outcome Measures   Lymphedema Life Impact Scale (score range 0-72). A higher score indicates greater impairment. 20   Subjective Report   Patient report of symptoms increased lymphedema in LLE/BLE   Patient / Family Goals   Patient / family goals statement to reduce lymphedema in both LEs and help RLE fit better with AFO   Pain   Patient currently in pain No   Cognitive Status   Orientation Orientation to person, place and time   Level of consciousness Alert   Follows commands and answers questions 100% of the time   Personal safety and judgement Intact   Edema Exam / Assessment   Skin condition Pitting;Non-pitting   Skin condition comments 1+ pitting LLE with tighter fibrotic tissue foot-mid calf; no assessment RLE 2/2 time and AFO   Pitting 1+   Pitting location BLE   Capillary refill Symmetrical   Stemmer sign Negative   Girth Measurements   Girth Measurements Refer to separate girth measurement flowsheet  (will obtain upon return for services)   Range of Motion   ROM comments BLE WFL   Strength   Strength comments NT'd   Activities of Daily Living   Activities of Daily Living I-Min A   Bed Mobility   Bed mobility I   Transfers   Transfers Mod I   Gait / Locomotion   Gait / Locomotion Mod I  (4ww)   Vascular Assessment   Vascular Assessment Comments CVI BLE  (history DVT and PE)   Coordination   Coordination Gross motor coordination appropriate   Muscle Tone   Muscle tone No deficits were identified   Planned Edema Interventions   Planned edema interventions Manual lymph drainage;Fit for compression garment;Exercises;Precautions to prevent infection / exacerbation;Education;Skin care / precautions;Home management program development   Clinical Impression   Criteria for skilled therapeutic intervention met Yes   Therapy diagnosis lymphedema BLE   Influenced by the following impairments / conditions  Phlebolymphedema;Stage 2   Assessment of Occupational Performance 1-3 Performance Deficits   Identified Performance Deficits infection risk; decreased fit and I with LB cares/dressing; decreased functional mobility   Clinical Decision Making (Complexity) Low complexity   Treatment Frequency 3x/week   Treatment duration 3x/wk x 4 weeks; 12 appts total with flexibility to taper; 0x/wk until able to get on schedule   Patient / family and/or staff in agreement with plan of care Yes   Risks and benefits of therapy have been explained Yes   Clinical impression comments Pt will benefit from skilled lymphedema services to reduce BLE lymphedema for better fit AFO RLE, to reduce risk recurrent skin infections, and promote more time and ease with functional mobility tasks.   Goals   Edema Eval Goals 1;2;3   Goal 1   Goal identifier Velcro/reducing compression wearing   Goal description Pt will tolerate velcro compression approx 23 hrs/day to prevent re-acccumulation of extracellular fluid/max reductions in BLE lymphedema to reduce risk infections and promote better walking   Target date 04/14/23   Goal 2   Goal identifier MLD/HEP   Goal description Demonstrate independence in performing prescribed exercises to facilate the lymph system and muscle pumping systems for max reductions needed to reduce risk skin infections and reduce RLE for better fit AFO   Target date 04/14/23   Goal 3   Goal identifier Reductions   Goal description Pt will display a total BLE volume reduciton of 1L+ for reductions needed tor educe risk skin infections and help improve fit RLE AFO   Target date 04/14/23   Total Evaluation Time   OT Eval, Low Complexity Minutes (16425) 25   Certification   Certification date from 01/17/23   Certification date to 04/14/23   Medical Diagnosis lymphedema   Certification I certify the need for these services furnished under this plan of treatment and while under my care.  (Physician co-signature of this document  indicates review and certification of the therapy plan).

## 2023-01-18 NOTE — PROGRESS NOTES
[facility]        OUTPATIENT OCCUPATIONAL THERAPY EDEMA EVALUATION  PLAN OF TREATMENT FOR OUTPATIENT REHABILITATION  (COMPLETE FOR INITIAL CLAIMS ONLY)  Patient's Last Name, First Name, Chasity Child                           Provider s Name:   [facility] Medical Record No.  [id number]     Start of Care Date:  01/17/23   Onset Date:  01/06/23 (chronic for years)   Type:  OT   Medical Diagnosis:  lymphedema   Therapy Diagnosis:  lymphedema BLE Visits from SOC:  1                                     __________________________________________________________________________________   Plan of Treatment/Functional Goals:    Manual lymph drainage, Fit for compression garment, Exercises, Precautions to prevent infection / exacerbation, Education, Skin care / precautions, Home management program development        GOALS  1. Goal description: Pt will tolerate velcro compression approx 23 hrs/day to prevent re-acccumulation of extracellular fluid/max reductions in BLE lymphedema to reduce risk infections and promote better walking       Target date: 04/14/23  2. Goal description: Demonstrate independence in performing prescribed exercises to facilate the lymph system and muscle pumping systems for max reductions needed to reduce risk skin infections and reduce RLE for better fit AFO       Target date: 04/14/23  3. Goal description: Pt will display a total BLE volume reduciton of 1L+ for reductions needed tor educe risk skin infections and help improve fit RLE AFO       Target date: 04/14/23  4.            5.            6.               7.             8.              Treatment Frequency: 3x/week   Treatment duration: 3x/wk x 4 weeks; 12 appts total with flexibility to taper; 0x/wk until able to get on schedule    Brayan Zuluaga I  CERTIFY THE NEED FOR THESE SERVICES FURNISHED UNDER        THIS PLAN OF TREATMENT AND WHILE UNDER MY CARE     (Physician co-signature of this document indicates review and certification of the therapy plan).                   Certification date from: 01/17/23       Certification date to: 04/14/23           Referring physician: Shola Benoit   Initial Assessment  See Epic Evaluation- Start of care: 01/17/23

## 2023-01-20 ENCOUNTER — OFFICE VISIT (OUTPATIENT)
Dept: FAMILY MEDICINE | Facility: CLINIC | Age: 78
End: 2023-01-20
Payer: MEDICARE

## 2023-01-20 ENCOUNTER — ANCILLARY PROCEDURE (OUTPATIENT)
Dept: GENERAL RADIOLOGY | Facility: CLINIC | Age: 78
End: 2023-01-20
Attending: INTERNAL MEDICINE
Payer: MEDICARE

## 2023-01-20 VITALS
OXYGEN SATURATION: 99 % | SYSTOLIC BLOOD PRESSURE: 147 MMHG | HEART RATE: 78 BPM | HEIGHT: 60 IN | WEIGHT: 214.1 LBS | DIASTOLIC BLOOD PRESSURE: 71 MMHG | TEMPERATURE: 97.7 F | RESPIRATION RATE: 18 BRPM | BODY MASS INDEX: 42.03 KG/M2

## 2023-01-20 DIAGNOSIS — Z93.3 COLOSTOMY IN PLACE (H): ICD-10-CM

## 2023-01-20 DIAGNOSIS — N18.30 STAGE 3 CHRONIC KIDNEY DISEASE, UNSPECIFIED WHETHER STAGE 3A OR 3B CKD (H): ICD-10-CM

## 2023-01-20 DIAGNOSIS — I89.0 LYMPHEDEMA: ICD-10-CM

## 2023-01-20 DIAGNOSIS — Z87.2 HISTORY OF CELLULITIS: ICD-10-CM

## 2023-01-20 DIAGNOSIS — L03.119 CELLULITIS AND ABSCESS OF LEG: ICD-10-CM

## 2023-01-20 DIAGNOSIS — F32.0 MILD MAJOR DEPRESSION (H): ICD-10-CM

## 2023-01-20 DIAGNOSIS — I87.2 VENOUS STASIS DERMATITIS OF LEFT LOWER EXTREMITY: Primary | ICD-10-CM

## 2023-01-20 DIAGNOSIS — E44.0 MODERATE PROTEIN-CALORIE MALNUTRITION (H): ICD-10-CM

## 2023-01-20 DIAGNOSIS — Z79.899 ENCOUNTER FOR LONG-TERM (CURRENT) USE OF MEDICATIONS: ICD-10-CM

## 2023-01-20 DIAGNOSIS — I87.2 VENOUS STASIS DERMATITIS OF LEFT LOWER EXTREMITY: ICD-10-CM

## 2023-01-20 DIAGNOSIS — L02.419 CELLULITIS AND ABSCESS OF LEG: ICD-10-CM

## 2023-01-20 DIAGNOSIS — I82.629 ACUTE DEEP VEIN THROMBOSIS (DVT) OF UPPER EXTREMITY, UNSPECIFIED LATERALITY, UNSPECIFIED VEIN (H): ICD-10-CM

## 2023-01-20 DIAGNOSIS — E11.21 DIABETIC NEPHROPATHY ASSOCIATED WITH TYPE 2 DIABETES MELLITUS (H): ICD-10-CM

## 2023-01-20 LAB
ANION GAP SERPL CALCULATED.3IONS-SCNC: 14 MMOL/L (ref 7–15)
BUN SERPL-MCNC: 35.8 MG/DL (ref 8–23)
CALCIUM SERPL-MCNC: 9.6 MG/DL (ref 8.8–10.2)
CHLORIDE SERPL-SCNC: 107 MMOL/L (ref 98–107)
CREAT SERPL-MCNC: 1.58 MG/DL (ref 0.51–0.95)
DEPRECATED HCO3 PLAS-SCNC: 21 MMOL/L (ref 22–29)
ERYTHROCYTE [DISTWIDTH] IN BLOOD BY AUTOMATED COUNT: 12.6 % (ref 10–15)
ERYTHROCYTE [SEDIMENTATION RATE] IN BLOOD BY WESTERGREN METHOD: 17 MM/HR (ref 0–30)
GFR SERPL CREATININE-BSD FRML MDRD: 34 ML/MIN/1.73M2
GLUCOSE SERPL-MCNC: 149 MG/DL (ref 70–99)
HCT VFR BLD AUTO: 37.8 % (ref 35–47)
HGB BLD-MCNC: 12.2 G/DL (ref 11.7–15.7)
MCH RBC QN AUTO: 30 PG (ref 26.5–33)
MCHC RBC AUTO-ENTMCNC: 32.3 G/DL (ref 31.5–36.5)
MCV RBC AUTO: 93 FL (ref 78–100)
PLATELET # BLD AUTO: 252 10E3/UL (ref 150–450)
POTASSIUM SERPL-SCNC: 4.8 MMOL/L (ref 3.4–5.3)
RBC # BLD AUTO: 4.07 10E6/UL (ref 3.8–5.2)
SODIUM SERPL-SCNC: 142 MMOL/L (ref 136–145)
WBC # BLD AUTO: 10.6 10E3/UL (ref 4–11)

## 2023-01-20 PROCEDURE — 99215 OFFICE O/P EST HI 40 MIN: CPT | Performed by: INTERNAL MEDICINE

## 2023-01-20 PROCEDURE — 36415 COLL VENOUS BLD VENIPUNCTURE: CPT | Performed by: INTERNAL MEDICINE

## 2023-01-20 PROCEDURE — 73590 X-RAY EXAM OF LOWER LEG: CPT | Mod: TC | Performed by: RADIOLOGY

## 2023-01-20 PROCEDURE — 85652 RBC SED RATE AUTOMATED: CPT | Performed by: INTERNAL MEDICINE

## 2023-01-20 PROCEDURE — 80048 BASIC METABOLIC PNL TOTAL CA: CPT | Performed by: INTERNAL MEDICINE

## 2023-01-20 PROCEDURE — 85027 COMPLETE CBC AUTOMATED: CPT | Performed by: INTERNAL MEDICINE

## 2023-01-20 ASSESSMENT — PAIN SCALES - GENERAL: PAINLEVEL: NO PAIN (0)

## 2023-01-20 NOTE — PATIENT INSTRUCTIONS
(I87.2) Venous stasis dermatitis of left lower extremity  (primary encounter diagnosis)  Comment: We will check X-ray of the leg today given occasional twinges of pain in the shin and will check labs again.  We will also discuss with infectious disease as to whether to continue antibiotics, as you have already completed 4 weeks of doxycycline and keflex.   If symptoms worsen off of antibiotics  - go to emergency department   Plan: XR Tibia and Fibula Left 2 Views, Basic         metabolic panel  (Ca, Cl, CO2, Creat, Gluc, K,         Na, BUN), CBC with platelets, ESR: Erythrocyte         sedimentation rate            (Z79.899) Encounter for long-term (current) use of medications  Comment:   Plan:     (E11.21) Diabetic nephropathy associated with type 2 diabetes mellitus (H)  Comment: recheck basic metabolic panel labs   Plan:     (Z87.2) History of cellulitis  Comment: as above - we will check labs today and follow up accordingly   Plan: XR Tibia and Fibula Left 2 Views, Basic         metabolic panel  (Ca, Cl, CO2, Creat, Gluc, K,         Na, BUN), CBC with platelets, ESR: Erythrocyte         sedimentation rate            (I89.0) Lymphedema  Comment: Continue lymphedema treatment   Plan: XR Tibia and Fibula Left 2 Views, Basic         metabolic panel  (Ca, Cl, CO2, Creat, Gluc, K,         Na, BUN), CBC with platelets, ESR: Erythrocyte         sedimentation rate            (N18.30) Stage 3 chronic kidney disease, unspecified whether stage 3a or 3b CKD (H)  Comment: check labs and continue lasix 2-3 x/week  Plan: XR Tibia and Fibula Left 2 Views, Basic         metabolic panel  (Ca, Cl, CO2, Creat, Gluc, K,         Na, BUN), CBC with platelets

## 2023-01-20 NOTE — PROGRESS NOTES
Raheem Cochran is a 77 year old, presenting for the following health issues:  RECHECK      History of Present Illness       CKD: She uses over the counter pain medication, including Tylenol, two times daily.    Reason for visit:  Review elevated test results, review how cellulitis is healing with antibiotics.    She eats 2-3 servings of fruits and vegetables daily.She consumes 0 sweetened beverage(s) daily.She exercises with enough effort to increase her heart rate 9 or less minutes per day.  She exercises with enough effort to increase her heart rate 3 or less days per week.   She is taking medications regularly.     Follow up cellulitis  Lymphedema   Chasity Hodgson did have follow up in lymphedema treatment and was fitted for new lymphedema wraps as hers have not been working very well.  She notes that as she is up and walking around that the swelling may improve slightly.  She was noted to have remnants of cellulitis, but skin otherwise looked good.   She has not noticed and soreness or skin ulcers.  She is of the mindset that the erythema has improved from last week and that the remnant pinkness will always be there secondary to venous stasis dermatitis.  Has now completed 4 weeks of antibiotics total.  She still notes occasional twinges in her left shin that comes and goes.   She has been taking her lasix about twice per week.  Her blood pressure has been up and down in the 130-160's systolic.  Blood pressure depends on pain in her arm and shoulder    Review of Systems   Constitutional, HEENT, cardiovascular, pulmonary, GI, , musculoskeletal, neuro, skin, endocrine and psych systems are negative, except as otherwise noted.      Objective    BP (!) 147/71 (BP Location: Left arm, Patient Position: Sitting, Cuff Size: Adult Regular)   Pulse 78   Temp 97.7  F (36.5  C) (Tympanic)   Resp 18   Ht 1.524 m (5')   Wt 97.1 kg (214 lb 1.6 oz)   SpO2 99%   BMI 41.81 kg/m    Body mass index is 41.81  kg/m .  Physical Exam   GENERAL: healthy, alert and no distress  EYES: Eyes grossly normal to inspection,    NECK: no adenopathy, no asymmetry,   RESP: lungs clear to auscultation - no rales, rhonchi or wheezes  CV: regular rate and rhythm, normal S1 S2, no S3 or S4, no murmur  ABDOMEN: soft, nontender, no hepatosplenomegaly   MS: AFO rigth ankle, edema as below  SKIN: see below  NEURO: Normal strength and tone, mentation intact and speech normal  PSYCH: mentation appears normal, affect normal/bright         Recent Results (from the past 240 hour(s))   CBC with platelets    Collection Time: 01/13/23  1:41 PM   Result Value Ref Range    WBC Count 8.1 4.0 - 11.0 10e3/uL    RBC Count 4.00 3.80 - 5.20 10e6/uL    Hemoglobin 11.9 11.7 - 15.7 g/dL    Hematocrit 37.0 35.0 - 47.0 %    MCV 93 78 - 100 fL    MCH 29.8 26.5 - 33.0 pg    MCHC 32.2 31.5 - 36.5 g/dL    RDW 12.6 10.0 - 15.0 %    Platelet Count 212 150 - 450 10e3/uL   Basic metabolic panel  (Ca, Cl, CO2, Creat, Gluc, K, Na, BUN)    Collection Time: 01/13/23  1:41 PM   Result Value Ref Range    Sodium 142 136 - 145 mmol/L    Potassium 4.9 3.4 - 5.3 mmol/L    Chloride 105 98 - 107 mmol/L    Carbon Dioxide (CO2) 22 22 - 29 mmol/L    Anion Gap 15 7 - 15 mmol/L    Urea Nitrogen 35.9 (H) 8.0 - 23.0 mg/dL    Creatinine 1.54 (H) 0.51 - 0.95 mg/dL    Calcium 9.4 8.8 - 10.2 mg/dL    Glucose 135 (H) 70 - 99 mg/dL    GFR Estimate 35 (L) >60 mL/min/1.73m2   CBC with platelets    Collection Time: 01/20/23 12:25 PM   Result Value Ref Range    WBC Count 10.6 4.0 - 11.0 10e3/uL    RBC Count 4.07 3.80 - 5.20 10e6/uL    Hemoglobin 12.2 11.7 - 15.7 g/dL    Hematocrit 37.8 35.0 - 47.0 %    MCV 93 78 - 100 fL    MCH 30.0 26.5 - 33.0 pg    MCHC 32.3 31.5 - 36.5 g/dL    RDW 12.6 10.0 - 15.0 %    Platelet Count 252 150 - 450 10e3/uL         Patient Instructions   (I87.2) Venous stasis dermatitis of left lower extremity  (primary encounter diagnosis)  Comment: We will check X-ray of the leg  today given occasional twinges of pain in the shin and will check labs again.  We will also discuss with infectious disease as to whether to continue antibiotics, as you have already completed 4 weeks of doxycycline and keflex.   If symptoms worsen off of antibiotics  - go to emergency department   Plan: XR Tibia and Fibula Left 2 Views, Basic         metabolic panel  (Ca, Cl, CO2, Creat, Gluc, K,         Na, BUN), CBC with platelets, ESR: Erythrocyte         sedimentation rate            (Z79.899) Encounter for long-term (current) use of medications  Comment:   Plan:     (E11.21) Diabetic nephropathy associated with type 2 diabetes mellitus (H)  Comment: recheck basic metabolic panel labs   Plan:     (Z87.2) History of cellulitis  Comment: as above - we will check labs today and follow up accordingly   Plan: XR Tibia and Fibula Left 2 Views, Basic         metabolic panel  (Ca, Cl, CO2, Creat, Gluc, K,         Na, BUN), CBC with platelets, ESR: Erythrocyte         sedimentation rate            (I89.0) Lymphedema  Comment: Continue lymphedema treatment   Plan: XR Tibia and Fibula Left 2 Views, Basic         metabolic panel  (Ca, Cl, CO2, Creat, Gluc, K,         Na, BUN), CBC with platelets, ESR: Erythrocyte         sedimentation rate            (N18.30) Stage 3 chronic kidney disease, unspecified whether stage 3a or 3b CKD (H)  Comment: check labs and continue lasix 2-3 x/week  Plan: XR Tibia and Fibula Left 2 Views, Basic         metabolic panel  (Ca, Cl, CO2, Creat, Gluc, K,         Na, BUN), CBC with platelets                    ADDENDUM: I spoke with infectious disease on-call provider, Dr. Mosley and it was agreed that does not appear that she has active cellulitis at this time, and we will hold off on prescribing any additional antibiotics with follow-up infectious disease in 10 days

## 2023-01-20 NOTE — RESULT ENCOUNTER NOTE
Gerard Gaspar,    I have had the opportunity to review your recent results and an interpretation is as follows:  Your x-ray does not show any concern with acute concerns other than there is notable soft tissue swelling which we have previously known.  There was a little bit of arthritis and some diffuse bone demineralization.  We can continue to monitor as we discussed    Sincerely,  Shola Benoit MD

## 2023-01-20 NOTE — RESULT ENCOUNTER NOTE
Gerard Gaspar,    I have had the opportunity to review your recent results and an interpretation is as follows:  Your markers for inflammation and complete blood counts all returned within normal limits    In addition I did speak with Dr. Mosley in infectious disease clinic and she agreed that does not appear to have active cellulitis and that it is safe to stop the antibiotics this time with follow-up in infectious disease as planned    Sincerely,  Shola Benoit MD

## 2023-01-24 NOTE — RESULT ENCOUNTER NOTE
Gerard Gaspar,    I have had the opportunity to review your recent results and an interpretation is as follows:  Your follow up basic metabolic panel shows still elevated creatinine.  I'd recommend pushing fluids 8 x 8 oz water = 64 oz water per day and recheck in 2-4 weeks     Sincerely,  Shola Benoit MD

## 2023-02-03 ENCOUNTER — OFFICE VISIT (OUTPATIENT)
Dept: FAMILY MEDICINE | Facility: CLINIC | Age: 78
End: 2023-02-03
Payer: MEDICARE

## 2023-02-03 VITALS
WEIGHT: 214 LBS | OXYGEN SATURATION: 99 % | BODY MASS INDEX: 42.01 KG/M2 | DIASTOLIC BLOOD PRESSURE: 71 MMHG | HEIGHT: 60 IN | RESPIRATION RATE: 18 BRPM | HEART RATE: 65 BPM | TEMPERATURE: 97.4 F | SYSTOLIC BLOOD PRESSURE: 138 MMHG

## 2023-02-03 DIAGNOSIS — I89.0 LYMPHEDEMA: Primary | ICD-10-CM

## 2023-02-03 DIAGNOSIS — E11.21 DIABETIC NEPHROPATHY ASSOCIATED WITH TYPE 2 DIABETES MELLITUS (H): ICD-10-CM

## 2023-02-03 DIAGNOSIS — Z79.899 ENCOUNTER FOR LONG-TERM (CURRENT) USE OF MEDICATIONS: ICD-10-CM

## 2023-02-03 LAB
ALBUMIN SERPL BCG-MCNC: 4 G/DL (ref 3.5–5.2)
ALBUMIN UR-MCNC: NEGATIVE MG/DL
ALP SERPL-CCNC: 47 U/L (ref 35–104)
ALT SERPL W P-5'-P-CCNC: 24 U/L (ref 10–35)
ANION GAP SERPL CALCULATED.3IONS-SCNC: 12 MMOL/L (ref 7–15)
APPEARANCE UR: CLEAR
AST SERPL W P-5'-P-CCNC: 41 U/L (ref 10–35)
BACTERIA #/AREA URNS HPF: ABNORMAL /HPF
BILIRUB SERPL-MCNC: 0.3 MG/DL
BILIRUB UR QL STRIP: NEGATIVE
BUN SERPL-MCNC: 26 MG/DL (ref 8–23)
CALCIUM SERPL-MCNC: 9.9 MG/DL (ref 8.8–10.2)
CHLORIDE SERPL-SCNC: 106 MMOL/L (ref 98–107)
COLOR UR AUTO: YELLOW
CREAT SERPL-MCNC: 1.14 MG/DL (ref 0.51–0.95)
DEPRECATED HCO3 PLAS-SCNC: 25 MMOL/L (ref 22–29)
GFR SERPL CREATININE-BSD FRML MDRD: 50 ML/MIN/1.73M2
GLUCOSE SERPL-MCNC: 146 MG/DL (ref 70–99)
GLUCOSE UR STRIP-MCNC: NEGATIVE MG/DL
HGB UR QL STRIP: NEGATIVE
KETONES UR STRIP-MCNC: NEGATIVE MG/DL
LEUKOCYTE ESTERASE UR QL STRIP: ABNORMAL
NITRATE UR QL: NEGATIVE
PH UR STRIP: 5.5 [PH] (ref 5–7)
POTASSIUM SERPL-SCNC: 5 MMOL/L (ref 3.4–5.3)
PROT SERPL-MCNC: 6.8 G/DL (ref 6.4–8.3)
RBC #/AREA URNS AUTO: ABNORMAL /HPF
SODIUM SERPL-SCNC: 143 MMOL/L (ref 136–145)
SP GR UR STRIP: 1.02 (ref 1–1.03)
SQUAMOUS #/AREA URNS AUTO: ABNORMAL /LPF
UROBILINOGEN UR STRIP-ACNC: 0.2 E.U./DL
WBC #/AREA URNS AUTO: ABNORMAL /HPF

## 2023-02-03 PROCEDURE — 80053 COMPREHEN METABOLIC PANEL: CPT | Performed by: INTERNAL MEDICINE

## 2023-02-03 PROCEDURE — 81001 URINALYSIS AUTO W/SCOPE: CPT | Performed by: INTERNAL MEDICINE

## 2023-02-03 PROCEDURE — 36415 COLL VENOUS BLD VENIPUNCTURE: CPT | Performed by: INTERNAL MEDICINE

## 2023-02-03 PROCEDURE — 99213 OFFICE O/P EST LOW 20 MIN: CPT | Performed by: INTERNAL MEDICINE

## 2023-02-03 ASSESSMENT — PAIN SCALES - GENERAL: PAINLEVEL: SEVERE PAIN (7)

## 2023-02-03 NOTE — PROGRESS NOTES
Raheem Cochran is a 77 year old, presenting for the following health issues:  Follow Up      History of Present Illness       CKD: She uses over the counter pain medication, including Tylenol, two times daily.    Hypertension: She presents for follow up of hypertension.  She does check blood pressure  regularly outside of the clinic. Outside blood pressures have been over 140/90. She follows a low salt diet.          Encounter for long-term (current) use of medications  Diabetic nephropathy associated with type 2 diabetes mellitus (H)  Lymphedema   Chasity Hodgson has had follow up in lymphedema clinic and did have an ability to follow up in infectious disease last week.  She is not requiring any antibiotics at this time.  Erythema has remained stable.  Pain will come and go with soreness.  Lymphedema treatment has been helpful and she continues towrap her own legs.   She has continued on atenolol, lisinopril, amlodipine, and lasix twice per week.  She has been trying to drink fluids as best she is able.  She is checking her blood pressure at home and it may be high at times 150's and sometime 130'/70's.  On average its in upper 130's-140's.      Review of Systems   Constitutional, HEENT, cardiovascular, pulmonary, GI, , musculoskeletal, neuro, skin - as above, endocrine and psych systems are negative, except as otherwise noted.      Objective    BP (!) 144/71 (BP Location: Left arm, Patient Position: Sitting, Cuff Size: Adult Large)   Pulse 65   Temp 97.4  F (36.3  C)   Resp 18   Ht 1.524 m (5')   Wt 97.1 kg (214 lb)   SpO2 99%   BMI 41.79 kg/m    Body mass index is 41.79 kg/m .  Physical Exam   GENERAL: healthy, alert and no distress  EYES: Eyes grossly normal to inspection,   NECK: no adenopathy, no asymmetry,   RESP: lungs clear to auscultation - no rales, rhonchi or wheezes  CV: regular rate and rhythm, normal S1 S2, no S3 or S4, no murmur, + lymphedema - using compression stockings  ABDOMEN:  soft, nontender, no hepatosplenomegaly, no masses and bowel sounds normal  MS: Left ankle in brace  SKIN: no suspicious lesions or rashes  NEURO: Normal strength and tone, mentation intact and speech normal  PSYCH: mentation appears normal, affect normal/bright    Recent Results (from the past 240 hour(s))   UA Macro with Reflex to Micro and Culture - lab collect    Collection Time: 02/03/23 12:07 PM    Specimen: Urine, Midstream   Result Value Ref Range    Color Urine Yellow Colorless, Straw, Light Yellow, Yellow    Appearance Urine Clear Clear    Glucose Urine Negative Negative mg/dL    Bilirubin Urine Negative Negative    Ketones Urine Negative Negative mg/dL    Specific Gravity Urine 1.025 1.003 - 1.035    Blood Urine Negative Negative    pH Urine 5.5 5.0 - 7.0    Protein Albumin Urine Negative Negative mg/dL    Urobilinogen Urine 0.2 0.2, 1.0 E.U./dL    Nitrite Urine Negative Negative    Leukocyte Esterase Urine Small (A) Negative   Urine Microscopic    Collection Time: 02/03/23 12:07 PM   Result Value Ref Range    Bacteria Urine Few (A) None Seen /HPF    RBC Urine None Seen 0-2 /HPF /HPF    WBC Urine 5-10 (A) 0-5 /HPF /HPF    Squamous Epithelials Urine Moderate (A) None Seen /LPF         Patient Instructions   (I89.0) Lymphedema  (primary encounter diagnosis)  Comment: Continue lymphedema wrapping and doing well with current therapy  Plan: Basic metabolic panel  (Ca, Cl, CO2, Creat,         Gluc, K, Na, BUN)            (Z79.899) Encounter for long-term (current) use of medications  Comment:   Plan:     (E11.21) Diabetic nephropathy associated with type 2 diabetes mellitus (H)  Comment: Continue all current blood pressure medications atenolol, lisinopril, furosemide, amlodipine and continue to push fluids.    Plan: Basic metabolic panel  (Ca, Cl, CO2, Creat,         Gluc, K, Na, BUN)

## 2023-02-03 NOTE — PATIENT INSTRUCTIONS
(I89.0) Lymphedema  (primary encounter diagnosis)  Comment: Continue lymphedema wrapping and doing well with current therapy  Plan: Basic metabolic panel  (Ca, Cl, CO2, Creat,         Gluc, K, Na, BUN)            (Z79.899) Encounter for long-term (current) use of medications  Comment:   Plan:     (E11.21) Diabetic nephropathy associated with type 2 diabetes mellitus (H)  Comment: Continue all current blood pressure medications atenolol, lisinopril, furosemide, amlodipine and continue to push fluids.    Plan: Basic metabolic panel  (Ca, Cl, CO2, Creat,         Gluc, K, Na, BUN)

## 2023-02-05 NOTE — RESULT ENCOUNTER NOTE
Gerard Gaspar,    I have had the opportunity to review your recent results and an interpretation is as follows:  Your comprehensive metabolic panel shows stable improved renal function and electrolytes  Your urinalysis shows elevated white blood cells consistent with contamination       Sincerely,  Shola Benoit MD

## 2023-02-06 ENCOUNTER — TRANSFERRED RECORDS (OUTPATIENT)
Dept: HEALTH INFORMATION MANAGEMENT | Facility: CLINIC | Age: 78
End: 2023-02-06
Payer: MEDICARE

## 2023-02-07 ENCOUNTER — THERAPY VISIT (OUTPATIENT)
Dept: PHYSICAL THERAPY | Facility: CLINIC | Age: 78
End: 2023-02-07
Attending: PHYSICIAN ASSISTANT
Payer: MEDICARE

## 2023-02-07 DIAGNOSIS — N39.42 URINARY INCONTINENCE WITHOUT SENSORY AWARENESS: ICD-10-CM

## 2023-02-07 PROCEDURE — 97110 THERAPEUTIC EXERCISES: CPT | Mod: GP | Performed by: PHYSICAL THERAPIST

## 2023-02-07 PROCEDURE — 97535 SELF CARE MNGMENT TRAINING: CPT | Mod: GP | Performed by: PHYSICAL THERAPIST

## 2023-02-07 PROCEDURE — 97163 PT EVAL HIGH COMPLEX 45 MIN: CPT | Mod: GP | Performed by: PHYSICAL THERAPIST

## 2023-02-07 NOTE — PROGRESS NOTES
"Physical Therapy Initial Evaluation  Subjective:  SUBJECTIVE:  Patient reports onset of symptoms \"for years\", referred to PT 12/28/22.Symptoms include urinary mixed incontinence.  Since onset symptoms have been getting better, worse or staying the same? Worse. Hx of hysterectomy, exploratory, bladder repair, colostomy/rectal.  Goals are to be able to hold urine until gets to bathroom. Has prescription for Detrol but not started yet.   Urination:  Do you leak on the way to the bathroom or with a strong urge to void? Yes    Do you leak with cough,sneeze, jumping, running?Yes   Any other activities that cause leaking? No   Do you have triggers that make you feel you can't wait to go to the bathroom? No   what are they NA.  Type of pad and number used per day? ~5, #6 pads/day Always discreet  When you leak what is the amount? Large, soak pad or wet clothes. Uses a pad in chair just in case.  Voiding every 1-2 hours, sometimes more often b/c not emptied all the way  How long can you delay the need to urinate? Not at all or just a 1-2 minutes  How many times do you get up to urinate at night? 1-2x, leaks at night too.     Can you stop the flow of urine when on the toilet? No  Is the volume of urine passed usually: small. (8sec rule=  250ml with average bladder storing  400-600ml)    Do you strain to pass urine? No  Do you have a slow or hesitant urinary stream? No  Do you have difficulty initiating the urine stream? No  Is urination painful?  No    How many bladder infections have you had in last 12 months?recurrent, also recurrent cellulitis    Fluid intake(one glass is 8oz or one cup)  40 oz glasses/day, 2-3 x week caffinated glasses/day, or ICE water  0 alcohol glasses/day.    Bowel habits:  Frequency of bowel movements? Colostomy bag 2012. Had artificial sphincter placed but didn't work so had to go to bag.       Pelvic Pain:  Do you have any pelvic pain with intercourse, exams, use of tampons? No  Is initial " penetration during intercourse painful? Not asked  Is deeper penetration painful? Not asked  Do you use lubricant?   Not asked What kind? NA      Given birth? Yes Any complications? Yes  # of vaginal delieveries?3   # of C-sections?0  # of episiotomies?2.  Are you sexually active?No  Have you ever been worried for your physical safety? No   Any abdominal or pelvic surgeries? Hysterectomy,bladder repair, colostomy, exploratory  Are you having any regular exercise?yes  Have you practiced the PF(kegel) exercises for 4 or more weeks?yes, has tried        The history is provided by the patient.   Patient Health History           General health as reported by patient is good.  Pertinent medical history includes: anemia, cancer, diabetes, fibromyalgia, high blood pressure, incontinence, menopausal, osteoarthritis, overweight and thyroid problems (polio in 1948 with R side weakness/AFO).      Other medical allergies details: many, see EPIC.    Other surgery history details: B TSA's, thyroid CA, L TRACIE.    Current medications:  High blood pressure medication, pain medication and thyroid medication.    Current occupation is retired.                                       Objective:    Gait:  Walks with walker due to R LE weakness from polio as a child                                           Pelvic Dysfunction Evaluation:    Bladder/Pelvic Problems:    Storage Problem:  Mixed incontinence        Diagnostic Tests:    Pelvic Exam:  Yes  UA/Urine Sample:  Neg right now, recently treated for UTI                      Abdominal Wall:  Abdominal wall pelvic: fair TA set, colostomy bag present.        Pelvic Clock Exam:  Pelvic clock exam: R>L mild tension PF.  Ischiocavernosis pain:  -  Bulbocavernosis pain:  -  Transverse Perineal:  -  Levator ANI:  +      Reflex Testing:  normal    External Assessment:    Skin Condition:  Atrophic        Introitus:  Normal  Muscle Contraction/Perineal Mobility:  Slight lift, no urogential triangle  descent  Internal Assessment:  Internal assessment pelvic: kegel 2 to 2+, good relaxation post contraction.    Contraction/Grade:  Weak squeeze, 2 second hold (2)          Additional History:  Delivery History:  Vaginal delivery and episiotomies  Number of Pregnancies: 3  Number of Live Births: 3  Caffeine Consumption:  1                     General     ROS    Assessment/Plan:    Patient is a 77 year old female with pelvic complaints.    Patient has the following significant findings with corresponding treatment plan.                Diagnosis 1:  Mixed incontinence  Decreased ROM/flexibility - manual therapy and therapeutic exercise  Decreased strength - therapeutic exercise and therapeutic activities  Decreased proprioception - neuro re-education and therapeutic activities  Inflammation - self management/home program  Impaired muscle performance - neuro re-education  Decreased function - therapeutic activities    Therapy Evaluation Codes:   1) History comprised of:   Personal factors that impact the plan of care:      Time since onset of symptoms.    Comorbidity factors that impact the plan of care are:      Cancer, Diabetes, Fibromyalgia, High blood pressure, Menopausal, Osteoarthritis, Overweight and thyroid, polio.     Medications impacting care: High blood pressure, Pain and thyroid.  2) Examination of Body Systems comprised of:   Body structures and functions that impact the plan of care:      Pelvis.   Activity limitations that impact the plan of care are:      Frequency, Stress incontinence, Urgency and Urge incontinence.  3) Clinical presentation characteristics are:   Unstable/Unpredictable.  4) Decision-Making    High complexity using standardized patient assessment instrument and/or measureable assessment of functional outcome.  Cumulative Therapy Evaluation is: High complexity.    Previous and current functional limitations:  (See Goal Flow Sheet for this information)    Short term and Long term goals:  (See Goal Flow Sheet for this information)     Communication ability:  Patient appears to be able to clearly communicate and understand verbal and written communication and follow directions correctly.  Treatment Explanation - The following has been discussed with the patient:   RX ordered/plan of care  Anticipated outcomes  Possible risks and side effects  This patient would benefit from PT intervention to resume normal activities.   Rehab potential is fair.    Frequency:  2 X a month, once daily  Duration:  for 3 months  Discharge Plan:  Achieve all LTG.  Independent in home treatment program.  Reach maximal therapeutic benefit.    Please refer to the daily flowsheet for treatment today, total treatment time and time spent performing 1:1 timed codes.

## 2023-02-08 PROBLEM — N39.42 URINARY INCONTINENCE WITHOUT SENSORY AWARENESS: Status: ACTIVE | Noted: 2023-02-08

## 2023-02-08 NOTE — PROGRESS NOTES
[facility]    OUTPATIENT Physical Therapy ORTHOPEDIC EVALUATION  PLAN OF TREATMENT FOR OUTPATIENT REHABILITATION  (COMPLETE FOR INITIAL CLAIMS ONLY)  Patient's Last Name, First Name, M.I.  YOB: 1945  Chasity Hodgson    Provider s Name:  [facility]   Medical Record No.  [id number]   Start of Care Date:  02/07/23   Onset Date:   12/28/22 (date of order)   Treatment Diagnosis:  mixed incontinence Medical Diagnosis:  Urinary incontinence without sensory awareness       Goals:     02/07/23 0700   Urinary Leakage   Previous Functional Level No problems   Current Functional Level Leakage with walking;Leakage with cough or sneeze;Leaking with urge   Performance Level 10-12x day   STG Target Performance Decrease urinary leakage episodes in a day to   Performance Level ~6-8x or less   Rationale continence throughout the day;for healthy hygiene and to prevent skin breakdown   Due Date 03/22/23   LTG Target Performance Decrease urinary leakage episodes in a day to   Performance Level 2-3 or less   Rationale continence throughout the day;for healthy hygiene and to prevent skin breakdown   Due Date 05/03/23   Pad/Pantiliner usage   Previous Functional Level Number of pads/pantiliners used in a day   Performance Level 1-2   Current Functional Level Number of pads used in a day/night   Performance Level 5-6   STG Target Performance Number of pads used in a day/night   Performance Level 3-4 or less   Rationale continence throughout the day;for healthy hygiene and to prevent skin breakdown   Due Date 03/22/23   LTGTarget Performance Number of pads used in a day/night   Performance Level 1-2   Rationale continence throughout the day;for healthy hygiene and to prevent skin breakdown   Due Date 05/03/23         Therapy Frequency:  2x month  Predicted Duration of Therapy Intervention:  3  months    Kamilla Kuo, PT                 I CERTIFY THE NEED FOR THESE SERVICES FURNISHED UNDER        THIS PLAN OF TREATMENT AND WHILE UNDER MY CARE     (Physician attestation of this document indicates review and certification of the therapy plan).                     Certification Date From:  02/07/23   Certification Date To:  05/07/23    Referring Provider:  Terra Bridges    Initial Assessment        See Epic Evaluation SOC Date: 02/07/23

## 2023-02-14 ENCOUNTER — THERAPY VISIT (OUTPATIENT)
Dept: PHYSICAL THERAPY | Facility: CLINIC | Age: 78
End: 2023-02-14
Attending: PHYSICIAN ASSISTANT
Payer: MEDICARE

## 2023-02-14 DIAGNOSIS — N39.42 URINARY INCONTINENCE WITHOUT SENSORY AWARENESS: Primary | ICD-10-CM

## 2023-02-14 PROCEDURE — 97535 SELF CARE MNGMENT TRAINING: CPT | Mod: GP | Performed by: PHYSICAL THERAPIST

## 2023-02-14 PROCEDURE — 97110 THERAPEUTIC EXERCISES: CPT | Mod: GP | Performed by: PHYSICAL THERAPIST

## 2023-02-21 ENCOUNTER — THERAPY VISIT (OUTPATIENT)
Dept: PHYSICAL THERAPY | Facility: CLINIC | Age: 78
End: 2023-02-21
Attending: PHYSICIAN ASSISTANT
Payer: MEDICARE

## 2023-02-21 ENCOUNTER — TRANSFERRED RECORDS (OUTPATIENT)
Dept: HEALTH INFORMATION MANAGEMENT | Facility: CLINIC | Age: 78
End: 2023-02-21

## 2023-02-21 DIAGNOSIS — N39.42 URINARY INCONTINENCE WITHOUT SENSORY AWARENESS: Primary | ICD-10-CM

## 2023-02-21 PROCEDURE — 97110 THERAPEUTIC EXERCISES: CPT | Mod: GP | Performed by: PHYSICAL THERAPIST

## 2023-02-21 PROCEDURE — 97112 NEUROMUSCULAR REEDUCATION: CPT | Mod: GP | Performed by: PHYSICAL THERAPIST

## 2023-02-23 ENCOUNTER — MYC MEDICAL ADVICE (OUTPATIENT)
Dept: ENDOCRINOLOGY | Facility: CLINIC | Age: 78
End: 2023-02-23
Payer: MEDICARE

## 2023-02-27 ENCOUNTER — THERAPY VISIT (OUTPATIENT)
Dept: PHYSICAL THERAPY | Facility: CLINIC | Age: 78
End: 2023-02-27
Payer: MEDICARE

## 2023-02-27 DIAGNOSIS — N39.42 URINARY INCONTINENCE WITHOUT SENSORY AWARENESS: Primary | ICD-10-CM

## 2023-02-27 PROCEDURE — 97110 THERAPEUTIC EXERCISES: CPT | Mod: GP | Performed by: PHYSICAL THERAPIST

## 2023-02-27 PROCEDURE — 97112 NEUROMUSCULAR REEDUCATION: CPT | Mod: GP | Performed by: PHYSICAL THERAPIST

## 2023-02-28 ENCOUNTER — TRANSFERRED RECORDS (OUTPATIENT)
Dept: HEALTH INFORMATION MANAGEMENT | Facility: CLINIC | Age: 78
End: 2023-02-28

## 2023-03-01 ENCOUNTER — DOCUMENTATION ONLY (OUTPATIENT)
Dept: LAB | Facility: CLINIC | Age: 78
End: 2023-03-01
Payer: MEDICARE

## 2023-03-01 DIAGNOSIS — E55.9 AVITAMINOSIS D: ICD-10-CM

## 2023-03-01 DIAGNOSIS — E11.69 TYPE 2 DIABETES MELLITUS WITH OTHER SPECIFIED COMPLICATION, WITHOUT LONG-TERM CURRENT USE OF INSULIN (H): Primary | ICD-10-CM

## 2023-03-01 DIAGNOSIS — N18.31 STAGE 3A CHRONIC KIDNEY DISEASE (H): ICD-10-CM

## 2023-03-01 NOTE — PROGRESS NOTES
"Patient has lab only appt 3/9/23 for \"labs for Dr. Weston \", no orders in chart.  Please place FUTURE orders in Epic if appropriate.    Thanks,   [facility]    "

## 2023-03-03 ENCOUNTER — TELEPHONE (OUTPATIENT)
Dept: DERMATOLOGY | Facility: CLINIC | Age: 78
End: 2023-03-03
Payer: MEDICARE

## 2023-03-03 DIAGNOSIS — N18.31 CHRONIC KIDNEY DISEASE (CKD) STAGE G3A/A1, MODERATELY DECREASED GLOMERULAR FILTRATION RATE (GFR) BETWEEN 45-59 ML/MIN/1.73 SQUARE METER AND ALBUMINURIA CREATININE RATIO LESS THAN 30 MG/G (H): Primary | ICD-10-CM

## 2023-03-03 DIAGNOSIS — E55.9 AVITAMINOSIS D: ICD-10-CM

## 2023-03-03 DIAGNOSIS — D63.1 ANEMIA OF CHRONIC RENAL FAILURE: ICD-10-CM

## 2023-03-03 DIAGNOSIS — N18.9 ANEMIA OF CHRONIC RENAL FAILURE: ICD-10-CM

## 2023-03-03 NOTE — TELEPHONE ENCOUNTER
Sedrickm,1st attempt, informed patient that her appointment for Friday May 26th with Dr. Meyer needs to be rescheduled. Dr. Meyer will not be in clinic at that time. Please call [phone] to reschedule.

## 2023-03-03 NOTE — TELEPHONE ENCOUNTER
Pt states she is returning a call to reschedule her appt with Dr. Meyer, please call Pt back to reschedule, thanks!

## 2023-03-06 ENCOUNTER — TELEPHONE (OUTPATIENT)
Dept: DERMATOLOGY | Facility: CLINIC | Age: 78
End: 2023-03-06
Payer: MEDICARE

## 2023-03-06 NOTE — TELEPHONE ENCOUNTER
Writer spoke with patient and rescheduled her 5/26 appointment to Friday June 2nd at 11:40 AM with Dr. Meyer.Pt aware of the reschedule.

## 2023-03-07 ENCOUNTER — HOSPITAL ENCOUNTER (OUTPATIENT)
Dept: OCCUPATIONAL THERAPY | Facility: CLINIC | Age: 78
Setting detail: THERAPIES SERIES
Discharge: HOME | End: 2023-03-07
Attending: INTERNAL MEDICINE
Payer: MEDICARE

## 2023-03-07 PROCEDURE — 97140 MANUAL THERAPY 1/> REGIONS: CPT | Mod: GO | Performed by: OCCUPATIONAL THERAPIST

## 2023-03-09 ENCOUNTER — LAB (OUTPATIENT)
Dept: LAB | Facility: CLINIC | Age: 78
End: 2023-03-09
Payer: MEDICARE

## 2023-03-09 DIAGNOSIS — E55.9 AVITAMINOSIS D: ICD-10-CM

## 2023-03-09 DIAGNOSIS — N18.31 CHRONIC KIDNEY DISEASE (CKD) STAGE G3A/A1, MODERATELY DECREASED GLOMERULAR FILTRATION RATE (GFR) BETWEEN 45-59 ML/MIN/1.73 SQUARE METER AND ALBUMINURIA CREATININE RATIO LESS THAN 30 MG/G (H): ICD-10-CM

## 2023-03-09 DIAGNOSIS — N18.9 ANEMIA OF CHRONIC RENAL FAILURE: ICD-10-CM

## 2023-03-09 DIAGNOSIS — E11.69 TYPE 2 DIABETES MELLITUS WITH OTHER SPECIFIED COMPLICATION, WITHOUT LONG-TERM CURRENT USE OF INSULIN (H): ICD-10-CM

## 2023-03-09 DIAGNOSIS — D63.1 ANEMIA OF CHRONIC RENAL FAILURE: ICD-10-CM

## 2023-03-09 DIAGNOSIS — N18.31 STAGE 3A CHRONIC KIDNEY DISEASE (H): ICD-10-CM

## 2023-03-09 LAB
ALBUMIN SERPL BCG-MCNC: 4 G/DL (ref 3.5–5.2)
ANION GAP SERPL CALCULATED.3IONS-SCNC: 11 MMOL/L (ref 7–15)
BASOPHILS # BLD AUTO: 0 10E3/UL (ref 0–0.2)
BASOPHILS NFR BLD AUTO: 0 %
BUN SERPL-MCNC: 37.3 MG/DL (ref 8–23)
CALCIUM SERPL-MCNC: 9.4 MG/DL (ref 8.8–10.2)
CHLORIDE SERPL-SCNC: 108 MMOL/L (ref 98–107)
CHOLEST SERPL-MCNC: 155 MG/DL
CREAT SERPL-MCNC: 1.31 MG/DL (ref 0.51–0.95)
DEPRECATED HCO3 PLAS-SCNC: 24 MMOL/L (ref 22–29)
EOSINOPHIL # BLD AUTO: 0.2 10E3/UL (ref 0–0.7)
EOSINOPHIL NFR BLD AUTO: 2 %
ERYTHROCYTE [DISTWIDTH] IN BLOOD BY AUTOMATED COUNT: 12.8 % (ref 10–15)
GFR SERPL CREATININE-BSD FRML MDRD: 42 ML/MIN/1.73M2
GLUCOSE SERPL-MCNC: 155 MG/DL (ref 70–99)
HBA1C MFR BLD: 7.1 % (ref 0–5.6)
HCT VFR BLD AUTO: 37.3 % (ref 35–47)
HDLC SERPL-MCNC: 39 MG/DL
HGB BLD-MCNC: 11.9 G/DL (ref 11.7–15.7)
LDLC SERPL CALC-MCNC: 79 MG/DL
LYMPHOCYTES # BLD AUTO: 2.7 10E3/UL (ref 0.8–5.3)
LYMPHOCYTES NFR BLD AUTO: 29 %
MCH RBC QN AUTO: 29.5 PG (ref 26.5–33)
MCHC RBC AUTO-ENTMCNC: 31.9 G/DL (ref 31.5–36.5)
MCV RBC AUTO: 92 FL (ref 78–100)
MONOCYTES # BLD AUTO: 0.8 10E3/UL (ref 0–1.3)
MONOCYTES NFR BLD AUTO: 8 %
NEUTROPHILS # BLD AUTO: 5.8 10E3/UL (ref 1.6–8.3)
NEUTROPHILS NFR BLD AUTO: 61 %
NONHDLC SERPL-MCNC: 116 MG/DL
PHOSPHATE SERPL-MCNC: 3.8 MG/DL (ref 2.5–4.5)
PLATELET # BLD AUTO: 257 10E3/UL (ref 150–450)
POTASSIUM SERPL-SCNC: 5.4 MMOL/L (ref 3.4–5.3)
PTH-INTACT SERPL-MCNC: 15 PG/ML (ref 15–65)
RBC # BLD AUTO: 4.04 10E6/UL (ref 3.8–5.2)
SODIUM SERPL-SCNC: 143 MMOL/L (ref 136–145)
TRIGL SERPL-MCNC: 183 MG/DL
TSH SERPL DL<=0.005 MIU/L-ACNC: 1.13 UIU/ML (ref 0.3–4.2)
WBC # BLD AUTO: 9.5 10E3/UL (ref 4–11)

## 2023-03-09 PROCEDURE — 80069 RENAL FUNCTION PANEL: CPT

## 2023-03-09 PROCEDURE — 83036 HEMOGLOBIN GLYCOSYLATED A1C: CPT

## 2023-03-09 PROCEDURE — 36415 COLL VENOUS BLD VENIPUNCTURE: CPT

## 2023-03-09 PROCEDURE — 85025 COMPLETE CBC W/AUTO DIFF WBC: CPT

## 2023-03-09 PROCEDURE — 83970 ASSAY OF PARATHORMONE: CPT

## 2023-03-09 PROCEDURE — 80061 LIPID PANEL: CPT

## 2023-03-09 PROCEDURE — 84443 ASSAY THYROID STIM HORMONE: CPT

## 2023-03-09 PROCEDURE — 82306 VITAMIN D 25 HYDROXY: CPT

## 2023-03-10 ENCOUNTER — NURSE TRIAGE (OUTPATIENT)
Dept: CARDIOLOGY | Facility: CLINIC | Age: 78
End: 2023-03-10

## 2023-03-10 NOTE — TELEPHONE ENCOUNTER
"1. REASON FOR CALL or QUESTION: \"What is your reason for calling today?\" or \"How can I best help you?\" or \"What question do you have that I can help answer?\" Patient called with concern of her ongoing issue with bilateral leg swelling. She has been seeing the Lymphedema clinic and has been using compression socks and taking lasix 20mg twice a week per Dr. Benoit. Her Lymph clinic talked about her using a pump to help push fluid out however she is concerned because she heard that can cause a heart attack pushing fluid up into her chest. She wants to know what she should do. She has been watching salt in her diet. Will route this to her care team with Dr. Bhatia to further advise.     "

## 2023-03-12 LAB — DEPRECATED CALCIDIOL+CALCIFEROL SERPL-MC: 81 UG/L (ref 20–75)

## 2023-03-13 ENCOUNTER — THERAPY VISIT (OUTPATIENT)
Dept: PHYSICAL THERAPY | Facility: CLINIC | Age: 78
End: 2023-03-13
Payer: MEDICARE

## 2023-03-13 DIAGNOSIS — N39.42 URINARY INCONTINENCE WITHOUT SENSORY AWARENESS: Primary | ICD-10-CM

## 2023-03-13 PROCEDURE — 97535 SELF CARE MNGMENT TRAINING: CPT | Mod: GP | Performed by: PHYSICAL THERAPIST

## 2023-03-13 PROCEDURE — 97110 THERAPEUTIC EXERCISES: CPT | Mod: GP | Performed by: PHYSICAL THERAPIST

## 2023-03-13 PROCEDURE — 97112 NEUROMUSCULAR REEDUCATION: CPT | Mod: GP | Performed by: PHYSICAL THERAPIST

## 2023-03-15 ENCOUNTER — THERAPY VISIT (OUTPATIENT)
Dept: SLEEP MEDICINE | Facility: CLINIC | Age: 78
End: 2023-03-15
Payer: MEDICARE

## 2023-03-15 DIAGNOSIS — I10 BENIGN ESSENTIAL HYPERTENSION: ICD-10-CM

## 2023-03-15 DIAGNOSIS — G47.33 OSA (OBSTRUCTIVE SLEEP APNEA): ICD-10-CM

## 2023-03-15 PROCEDURE — 95810 POLYSOM 6/> YRS 4/> PARAM: CPT | Performed by: INTERNAL MEDICINE

## 2023-03-15 ASSESSMENT — SLEEP AND FATIGUE QUESTIONNAIRES
HOW LIKELY ARE YOU TO NOD OFF OR FALL ASLEEP WHILE SITTING AND TALKING TO SOMEONE: SLIGHT CHANCE OF DOZING
HOW LIKELY ARE YOU TO NOD OFF OR FALL ASLEEP WHEN YOU ARE A PASSENGER IN A CAR FOR AN HOUR WITHOUT A BREAK: MODERATE CHANCE OF DOZING
HOW LIKELY ARE YOU TO NOD OFF OR FALL ASLEEP WHILE WATCHING TV: MODERATE CHANCE OF DOZING
HOW LIKELY ARE YOU TO NOD OFF OR FALL ASLEEP WHILE SITTING INACTIVE IN A PUBLIC PLACE: SLIGHT CHANCE OF DOZING
HOW LIKELY ARE YOU TO NOD OFF OR FALL ASLEEP WHILE SITTING QUIETLY AFTER LUNCH WITHOUT ALCOHOL: SLIGHT CHANCE OF DOZING
HOW LIKELY ARE YOU TO NOD OFF OR FALL ASLEEP WHILE LYING DOWN TO REST IN THE AFTERNOON WHEN CIRCUMSTANCES PERMIT: MODERATE CHANCE OF DOZING
HOW LIKELY ARE YOU TO NOD OFF OR FALL ASLEEP WHILE SITTING AND READING: MODERATE CHANCE OF DOZING
HOW LIKELY ARE YOU TO NOD OFF OR FALL ASLEEP IN A CAR, WHILE STOPPED FOR A FEW MINUTES IN TRAFFIC: WOULD NEVER DOZE

## 2023-03-16 ENCOUNTER — NURSE TRIAGE (OUTPATIENT)
Dept: CARDIOLOGY | Facility: CLINIC | Age: 78
End: 2023-03-16
Payer: MEDICARE

## 2023-03-16 ENCOUNTER — TELEPHONE (OUTPATIENT)
Dept: CARDIOLOGY | Facility: CLINIC | Age: 78
End: 2023-03-16
Payer: MEDICARE

## 2023-03-16 DIAGNOSIS — I10 BENIGN ESSENTIAL HYPERTENSION: ICD-10-CM

## 2023-03-16 DIAGNOSIS — R07.89 ATYPICAL CHEST PAIN: Primary | ICD-10-CM

## 2023-03-16 DIAGNOSIS — I25.10 CORONARY ARTERY DISEASE INVOLVING NATIVE CORONARY ARTERY OF NATIVE HEART WITHOUT ANGINA PECTORIS: ICD-10-CM

## 2023-03-16 NOTE — PROCEDURES
SLEEP STUDY INTERPRETATION  DIAGNOSTIC POLYSOMNOGRAPHY REPORT      Patient: SUZANNE BROOKE  YOB: 1945  Study Date: 3/15/2023  MRN: [id number]  Referring Provider: MD Benoit Brian  Ordering Provider: MD Fuentes Rakesh    Indications for Polysomnography: The patient is a 77 year old Female who is 5' and weighs 211.0 lbs. Her BMI is 41.4, [facility] sleepiness scale 9 and neck circumference is 39 cm. A diagnostic polysomnogram was performed to evaluate for sleep apnea.    Polysomnogram Data: A full night polysomnogram recorded the standard physiologic parameters including EEG, EOG, EMG, ECG, nasal and oral airflow. Respiratory parameters of chest and abdominal movements were recorded with respiratory inductance plethysmography. Oxygen saturation was recorded by pulse oximetry. Hypopnea scoring rule used: 1B 4%.    Sleep Architecture: The total recording time of the polysomnogram was 413.6 minutes. The total sleep time was 378.0 minutes. Sleep latency was decreased at 3.1 minutes without the use of a sleep aid. REM latency was 84.0 minutes. Arousal index was normal at 7.6 arousals per hour. Sleep efficiency was normal at 91.4%. Wake after sleep onset was 32.0 minutes. The patient spent 3.0% of total sleep time in Stage N1, 56.7% in Stage N2, 20.9% in Stage N3, and 19.3% in REM. Time in REM supine was 73.0 minutes.    Respiration: Mild obstructive sleep apnea.     Events ? The polysomnogram revealed a presence of 1 obstructive, - central, and 1 mixed apneas resulting in an apnea index of 0.3 events per hour. There were 49 obstructive hypopneas and - central hypopneas resulting in an obstructive hypopnea index of 7.8 and central hypopnea index of - events per hour. The combined apnea/hypopnea index was 8.1 events per hour (central apnea/hypopnea index was - events per hour). The REM AHI was 16.4 events per hour. The supine AHI was 8.1 events per hour. The RERA index was 0.8 events per hour.  The RDI was 8.9  events per hour.    Snoring - was reported as mild.    Respiratory rate and pattern - was notable for normal respiratory rate and pattern.    Sustained Sleep Associated Hypoventilation - Transcutaneous carbon dioxide monitoring was not used, however significant hypoventilation was not suggested by oximetry.    Sleep Associated Hypoxemia - (Greater than 5 minutes O2 sat at or below 88%) was not present. Baseline oxygen saturation was 95.0%. Lowest oxygen saturation was 85.0%. Time spent less than or equal to 88% was 0.8 minutes. Time spent less than or equal to 89% was 1.1 minutes.    Movement Activity: Negative for significant movement abnormalities.     Periodic Limb Activity - There were - PLMs during the entire study. The PLM index was - movements per hour. The PLM Arousal Index was - per hour.    REM EMG Activity - Excessive transient/sustained muscle activity was not present.    Nocturnal Behavior - Abnormal sleep related behaviors were not noted during/arising out of NREM / REM sleep.     Bruxism - None apparent.    Cardiac Summary: Sinus rhythm.   The average pulse rate was 67.6 bpm. The minimum pulse rate was 59.0 bpm while the maximum pulse rate was 80.0 bpm.  Arrhythmias were not noted.    Assessment:     This sleep study shows a mild degree of obstructive sleep apnea and associated oxygen desaturations. Of note, patient slept entire night in the supine position.     Recommendations:    If treatment of mild obstructive sleep apnea is clinically indicated, therapy options can include the following.    Patient may be a candidate for dental appliance through referral to Sleep Dentistry for the treatment of obstructive sleep apnea.     If there is excessive daytime sleepiness, treatment could be empirically initiated with Auto?titrating PAP therapy with a range of 5 to 15 cmH2O. Recommend clinical follow up with sleep management team.    Weight management (if BMI > 30).    Diagnostic Codes:   Obstructive Sleep  Apnea G47.33    3/15/2023 [place] [facility] Sleep Study (211.0 lbs) - AHI 8.1, RDI 8.9, Supine AHI 8.1, REM AHI 16.4, Low O2 85.0%, Time Spent ?88% 0.8 minutes / Time Spent ?89% 1.1 minutes.     _____________________________________   Electronically Signed By: Lawrence Fuentes MD 3/16/2023

## 2023-03-16 NOTE — TELEPHONE ENCOUNTER
[facility] Call Center    Phone Message    May a detailed message be left on voicemail: yes     Reason for Call: Other: Patient expressed that one of her providers would like her to start using a pump to help move the fluid in her legs into her abdomen to try to fix the sweeling in her legs, but they said it could also cause a heart attack if you have heart problems. She is requesting a call back from the team to discuss if this is a safe option for her or if she should be looking at other options. Please call her back to discuss, thank you!     Action Taken: Message routed to:  Other: Cardiology    Travel Screening: Not Applicable

## 2023-03-16 NOTE — TELEPHONE ENCOUNTER
Called back to pt. Last visit with Dr. Bhatia on 12/5/22. Pt with hx of nonobstructive CAD per angiogram done in 2018. Last echo was done on 10/29/21. Plan for follow up in December 2023 with echo and labs.     Pt stated she cannot say how often she is getting the pain but seems to be happening more frequently. Pt stated the pain under her left breast and in left her armpit does not seem to be associated with exertion. Pt believes the pain has gotten worse since she had COVID in November 2022, it is lasting longer and happening more frequently. Per pt the pain lasts 15-20 minutes. Pt stated if she presses on her armpit it feels tender but pressing under her breast does not affect the pain.  Denied any shortness of breath at rest or on exertion. Denied any palpitations. Per pt she has occasional dizziness episodes that seem to correlate with low blood sugars.     Pt stated her upper arm pain is separate and can be reproduced if she moves her arm a certain way.    Pt is also wondering if she can use a pump for her lymphedema treatment and if this is safe with her cardiac history.     Dr. Bhatia currently out of the office, will review with DOD.

## 2023-03-16 NOTE — TELEPHONE ENCOUNTER
Received response from Dr. Monzon:     Milo Monzon MD Hair, Ashley W RN  Caller: Unspecified (Today,  1:50 PM)  Hello- this does not sound like cardiac pain but with all of her symptoms she should just be seen by an np with an echo.  If it gets worse she can go to the ED. She can do lymphedema therapy     Called back to pt, reviewed Dr. Monzon's recommendations. Reviewed ER precautions if worsening symptoms. Pt verbalized understanding and agreement with plan. Pt will call to schedule.

## 2023-03-16 NOTE — TELEPHONE ENCOUNTER
"Received a call from the call center to discuss chest pain.  Spoke to Sammie who says she has had intermittent chest pains that have happened 2-3 times in the last week. She says it is below her left breast and then goes into her armpit. She says sometimes she can feel it in her bicep or shoulder. She describes it as a heavy deep ache and says it last 15-20 minutes and then subsides, or she will get busy doing something else and forgets about it. She thought it could be something lymph node related. She denies dizziness, nausea, sweating, shortness of breath when she has the episodes.   She says ever since she had Covid in November, is when she started to notice these pains and is more bothersome in the armpit area than the under breast area.  Currently rates the discomfort in the armpit #6 and the breast #3, but says she \"feels good\" overall, just tired as she had a sleep study last night and is not feeling rested today.   BP on the call 159/80 HR 64 using a wrist monitor.  Advised a message will be sent to Dr. Bhatia's team for follow up.  Advised if symptoms worsen, it is recommended to go to ED for an evaluation.  She verbalized agreement and understanding.  1. LOCATION: \"Where does it hurt?\" under left breast   2. RADIATION: \"Does the pain go anywhere else?\" (e.g., into neck, jaw, arms, back) armpit, sometimes bicep or shoulder  3. ONSET: \"When did the chest pain begin?\" (Minutes, hours or days) within the last week  4. PATTERN \"Does the pain come and go, or has it been constant since it started?\" \"Does it get worse with exertion?\" intermittent. Not associated with activity only  5. DURATION: \"How long does it last\" (e.g., seconds, minutes, hours) 15-20 minutes  6. SEVERITY: \"How bad is the pain?\" (e.g., Scale 1-10; mild, moderate, or severe) Moderate  - MILD (1-3): doesn't interfere with normal activities   - MODERATE (4-7): interferes with normal activities or awakens from sleep  - SEVERE (8-10): excruciating " "pain, unable to do any normal activities   7. CARDIAC RISK FACTORS: \"Do you have any history of heart problems or risk factors for heart disease?\" (e.g., angina, prior heart attack; diabetes, high blood pressure, high cholesterol, smoker, or strong family history of heart disease) CAD, HTN  8. PULMONARY RISK FACTORS: \"Do you have any history of lung disease?\" (e.g., blood clots in lung, asthma, emphysema, birth control pills)  9. CAUSE: \"What do you think is causing the chest pain?\"  10. OTHER SYMPTOMS: \"Do you have any other symptoms?\" (e.g., dizziness, nausea, vomiting, sweating, fever, difficulty breathing, cough) denies      Additional Information    Negative: SEVERE chest pain    Negative: Pain also in shoulder(s) or arm(s) or jaw    Negative: Difficulty breathing    Protocols used: CHEST PAIN-A-OH      "

## 2023-03-17 LAB — SLPCOMP: NORMAL

## 2023-03-20 ENCOUNTER — TELEPHONE (OUTPATIENT)
Dept: ENDOCRINOLOGY | Facility: CLINIC | Age: 78
End: 2023-03-20
Payer: MEDICARE

## 2023-03-20 ASSESSMENT — ENCOUNTER SYMPTOMS
WEIGHT LOSS: 0
HOARSE VOICE: 1
NAIL CHANGES: 1
DIZZINESS: 1
WHEEZING: 0
SKIN CHANGES: 0
LEG PAIN: 0
EXERCISE INTOLERANCE: 0
ORTHOPNEA: 0
HEMOPTYSIS: 0
CHILLS: 1
SNORES LOUDLY: 0
HEMATURIA: 0
HYPERTENSION: 1
INCREASED ENERGY: 1
FLANK PAIN: 0
WEIGHT GAIN: 0
WEAKNESS: 1
SLEEP DISTURBANCES DUE TO BREATHING: 0
SINUS CONGESTION: 1
MUSCLE CRAMPS: 0
JOINT SWELLING: 0
DOUBLE VISION: 1
NECK MASS: 1
SEIZURES: 0
MYALGIAS: 1
NIGHT SWEATS: 1
HALLUCINATIONS: 0
FEVER: 0
SPUTUM PRODUCTION: 1
HEADACHES: 0
STIFFNESS: 0
ARTHRALGIAS: 0
NUMBNESS: 0
DECREASED APPETITE: 0
PARALYSIS: 0
SINUS PAIN: 0
MEMORY LOSS: 0
TREMORS: 0
SORE THROAT: 1
BACK PAIN: 1
NECK PAIN: 1
LIGHT-HEADEDNESS: 1
TASTE DISTURBANCE: 0
POOR WOUND HEALING: 0
DYSPNEA ON EXERTION: 0
SPEECH CHANGE: 0
FATIGUE: 1
SHORTNESS OF BREATH: 0
MUSCLE WEAKNESS: 1
DYSURIA: 0
DIFFICULTY URINATING: 1
HYPOTENSION: 0
POLYDIPSIA: 1
TROUBLE SWALLOWING: 0
PALPITATIONS: 0
POSTURAL DYSPNEA: 0
SMELL DISTURBANCE: 0
EYE PAIN: 0
ALTERED TEMPERATURE REGULATION: 0
COUGH: 1
LOSS OF CONSCIOUSNESS: 0
EYE WATERING: 0
TINGLING: 0
SYNCOPE: 0
COUGH DISTURBING SLEEP: 0
POLYPHAGIA: 0
EYE REDNESS: 0
DISTURBANCES IN COORDINATION: 0
EYE IRRITATION: 1

## 2023-03-20 NOTE — TELEPHONE ENCOUNTER
Called patient, connect [employer] to our database. Will be able to send Dr. Weston glucose readings for tomorrow's appt.     Anitha Orellana, BERNADETTE

## 2023-03-20 NOTE — TELEPHONE ENCOUNTER
[facility] Call Center    Phone Message    May a detailed message be left on voicemail: yes     Reason for Call: Other: .      Per Patient is wanting to get a call back to know she can send her Vini Readings over to the clinic before her appt tomorrow, 3/21/2023. Please advise.     Action Taken: Message routed to:  [facility] ([facility] Endo    Travel Screening: Not Applicable

## 2023-03-21 ENCOUNTER — APPOINTMENT (OUTPATIENT)
Dept: GENERAL RADIOLOGY | Facility: CLINIC | Age: 78
End: 2023-03-21
Attending: EMERGENCY MEDICINE
Payer: MEDICARE

## 2023-03-21 ENCOUNTER — HOSPITAL ENCOUNTER (EMERGENCY)
Facility: CLINIC | Age: 78
Discharge: HOME | End: 2023-03-21
Attending: PHYSICIAN ASSISTANT | Admitting: PHYSICIAN ASSISTANT
Payer: MEDICARE

## 2023-03-21 ENCOUNTER — HOSPITAL ENCOUNTER (OUTPATIENT)
Dept: CARDIOLOGY | Facility: CLINIC | Age: 78
Discharge: HOME | End: 2023-03-21
Attending: INTERNAL MEDICINE | Admitting: INTERNAL MEDICINE
Payer: MEDICARE

## 2023-03-21 ENCOUNTER — APPOINTMENT (OUTPATIENT)
Dept: CT IMAGING | Facility: CLINIC | Age: 78
End: 2023-03-21
Attending: PHYSICIAN ASSISTANT
Payer: MEDICARE

## 2023-03-21 ENCOUNTER — VIRTUAL VISIT (OUTPATIENT)
Dept: ENDOCRINOLOGY | Facility: CLINIC | Age: 78
End: 2023-03-21
Payer: MEDICARE

## 2023-03-21 VITALS
DIASTOLIC BLOOD PRESSURE: 71 MMHG | SYSTOLIC BLOOD PRESSURE: 174 MMHG | TEMPERATURE: 98.1 F | OXYGEN SATURATION: 98 % | RESPIRATION RATE: 16 BRPM | HEART RATE: 65 BPM

## 2023-03-21 DIAGNOSIS — I25.10 CORONARY ARTERY DISEASE INVOLVING NATIVE CORONARY ARTERY OF NATIVE HEART WITHOUT ANGINA PECTORIS: ICD-10-CM

## 2023-03-21 DIAGNOSIS — E11.9 TYPE 2 DIABETES, HBA1C GOAL < 7% (H): Primary | ICD-10-CM

## 2023-03-21 DIAGNOSIS — S93.601A RIGHT FOOT SPRAIN, INITIAL ENCOUNTER: ICD-10-CM

## 2023-03-21 LAB — LVEF ECHO: NORMAL

## 2023-03-21 PROCEDURE — 73700 CT LOWER EXTREMITY W/O DYE: CPT | Mod: RT,MA

## 2023-03-21 PROCEDURE — 73630 X-RAY EXAM OF FOOT: CPT | Mod: RT

## 2023-03-21 PROCEDURE — 99213 OFFICE O/P EST LOW 20 MIN: CPT | Mod: VID | Performed by: INTERNAL MEDICINE

## 2023-03-21 PROCEDURE — 93306 TTE W/DOPPLER COMPLETE: CPT

## 2023-03-21 PROCEDURE — 93306 TTE W/DOPPLER COMPLETE: CPT | Mod: 26 | Performed by: INTERNAL MEDICINE

## 2023-03-21 PROCEDURE — 99284 EMERGENCY DEPT VISIT MOD MDM: CPT | Mod: 25

## 2023-03-21 RX ORDER — INSULIN DEGLUDEC 100 U/ML
INJECTION, SOLUTION SUBCUTANEOUS
Qty: 5 ML | Refills: 1 | Status: SHIPPED | OUTPATIENT
Start: 2023-03-21 | End: 2023-04-17

## 2023-03-21 ASSESSMENT — ACTIVITIES OF DAILY LIVING (ADL): ADLS_ACUITY_SCORE: 40

## 2023-03-21 ASSESSMENT — ENCOUNTER SYMPTOMS: ARTHRALGIAS: 1
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
## 2023-03-21 NOTE — NURSING NOTE
Chief Complaint   Patient presents with     Follow Up     Diabetes       There were no vitals filed for this visit.    There is no height or weight on file to calculate BMI.    Jacqueline Laird, [facility]

## 2023-03-21 NOTE — PROGRESS NOTES
Video-Visit Details    Type of service:  Video Visit  Video Start Time: 1:10  Video End Time:1:38  Originating Location (pt. Location): [place], [place]  Distant Location (provider location):  Home  Platform used for Video Visit: Darrell Weston MD    Chasity Hodgson is a 77 year old year old female here for evaluation of Diabetes via a billable video visit.SHe also has PMHx of Diabetes Mellitus, and PTC/post surgical hypothyrodisim. She also has past medical istory of ANNETTE, gastroparesis, obesity,  Last seen by me Nov 2022    INTERVAL HISTORY:  - Tresiba no longer covered by insurance, working with insurance to find coverage or patient assistance for this, working with pharmacy to sort this out  - noting discrepancy when wearing daryn 3 on L arm vs R arm, registering lower when wearing on L arm vs R  - Stopped ozempic with worsening nausea/vomiting gastroparesis symptoms- did have improvement after stopping ozempic  -  Swelling has improved since stopped Lyrica      1) Diabetes Mellitus    Diabetes History:  Diagnosis: 2015, picked up on routine labs  Hospitalizations: None  Previous Regimens: Farxia (difficulty with frequent urination), Toujeo (was working well but insurance formulary changed 1/1/2021) At highest was at 48u daily, and then had profound low. At time of discontinuing, was down to 5u of toujeo. Previously on metformin. Glipizide  Current Regimen: Tresiba 25u daily, if under 150 when goes to dose, will take only 14,     BG check- Freestyle Libre3  Trends-   Overall high-- ave 163 (lower than August at 193, and May 224)                Complications: Gastroparesis- previously on reglan, overall not impactful    Last eye exam: Nov 22, 2021- no retinopathy, has cataracts  Foot Exam: [facility], checks every 60 days   Blood pressure- Lisinopril 40mg daily, Atenolol 50mg daily  Lipids- ezetimibe 10 mg daily, fenofibrate 145mg daily  SIOBHAN last 7/7- 25.52    2) Hypothyroidism s/p thyroidectomy for  PTC  Diagnosis: known 3 nodules that were being monitored, and in 2002 s/p thyroidectomy (nodule in isthmus was cancer), s/p BUTLER (per patient, unclear if clean margins so received BUTLER, unknown dose)  Treatment: Levothyroxine 112mcg  Residual tissue on R that has been monitored, not growing.   Last ultrasound 10/2021- no tissue seen  last biomarkers 10/7/2021- TG <0.1, TGAb <0.4      INTERVAL HISTORY-- has felt enlarging L side of her neck, ? Lymph node  Stable for 4-5 months     3) Vitamin D Deficiency  - Taking 2 tab daily  - Most recent 7/7/2021- 73  - PTH 9 (9/16/2021)--> recheck 21 10/21/2022  - Recent Vit D 81,     4) Hypertriglyceridemia  - On ezetimibe and fenofibrate, tried statin before with myalgias  - Eats minimal processed foods, minimal fried foods/baked treats  - Improved to 183 recently    BP Readings from Last 3 Encounters:   02/03/23 138/71   01/20/23 (!) 147/71   01/13/23 (!) 147/74       Lab Results   Component Value Date    A1C 9.6 01/11/2022    A1C 9.5 10/07/2021    A1C 9.0 08/02/2021    A1C 8.7 07/07/2021    A1C 6.2 11/27/2020    A1C 6.8 09/10/2019       Recent Labs   Lab Test 01/11/22  1359 03/29/19  1355 08/01/17  0000 02/15/17  0000 02/11/16  0000 03/31/15  1327   CHOL 177 196   < > 142   < > 158   HDL 34* 27*   < > 31   < > 34*   LDL 81 99   < > 49   < > 51   TRIG 308* 349*   < > 309   < > 365*   CHOLHDLRATIO  --   --   --  4.6  --  4.6    < > = values in this interval not displayed.       Lab Results   Component Value Date    MICROL 36 01/11/2022    MICROL 23 07/07/2021     No results found for: MICROALBUMIN        Wt Readings from Last 3 Encounters:   02/03/23 97.1 kg (214 lb)   01/20/23 97.1 kg (214 lb 1.6 oz)   01/13/23 96 kg (211 lb 11.2 oz)       Current Outpatient Medications   Medication Sig Dispense Refill     amLODIPine (NORVASC) 2.5 MG tablet Take 2.5mg in AM, 2.5mg at noon, and 5mg at bedtime 360 tablet 3     aspirin (ASA) 81 MG EC tablet Take 81 mg by mouth daily       atenolol  (TENORMIN) 50 MG tablet TAKE ONE TABLET BY MOUTH TWICE A  tablet 3     azelastine (ASTEPRO) 0.15 % nasal spray 1 spray       Cholecalciferol (VITAMIN D-3 PO) Take 2 tablets by mouth       clotrimazole (LOTRIMIN) 1 % cream Apply topically daily       Continuous Blood Gluc Sensor (FREESTYLE BRANDY 3 SENSOR) MISC 1 each every 14 days 6 each 3     diphenhydrAMINE-acetaminophen (TYLENOL PM)  MG tablet Take 1 tablet by mouth At Bedtime Reported on 3/20/2017       ezetimibe (ZETIA) 10 MG tablet Take 1 tablet (10 mg) by mouth daily 90 tablet 3     famotidine (PEPCID) 20 MG tablet Take 2 tablets (40 mg) by mouth as needed 180 tablet 3     fenofibrate (TRICOR) 145 MG tablet Take 1 tablet (145 mg) by mouth daily 90 tablet 3     ferrous sulfate (IRON) 325 (65 FE) MG tablet Take 325 mg by mouth daily (with breakfast)       fexofenadine (ALLEGRA) 180 MG tablet Take 180 mg by mouth daily. 120 0     fluocinolone acetonide (DERMA SMOOTHE/FS BODY) 0.01 % external oil Apply 2 mL to scalp once per week. Massage into scalp. Can be left in overnight or washed out after 4-6 hours. 118.28 mL 5     fluticasone (FLONASE) 50 MCG/ACT spray Spray 2 sprays in nostril daily 2 sprays in each nostril qd 1 Bottle 0     furosemide (LASIX) 20 MG tablet Take 1 tablet (20 mg) by mouth daily as needed (lower extremity edema) 90 tablet 3     Icosapent Ethyl 1 g CAPS Take 1 g by mouth 2 times daily 60 capsule 11     insulin degludec (TRESIBA FLEXTOUCH) 100 UNIT/ML pen Inject 25 Units Subcutaneous every morning. 5 mL 0     insulin pen needle (B-D U/F) 31G X 5 MM miscellaneous Use 1 pen needles daily or as directed. 90 each 3     lisinopril (ZESTRIL) 40 MG tablet Take 1 tablet (40 mg) by mouth daily 90 tablet 3     MULTIVITAMINS OR TABS ONE DAILY 100 3     nitroGLYcerin (NITROSTAT) 0.4 MG sublingual tablet Place 1 tablet (0.4 mg) under the tongue every 5 minutes as needed for chest pain (no more than 3 in one hour; after 3rd, call 911.) 25 tablet  3     nystatin (MYCOSTATIN) cream Apply topically daily as needed        nystatin-triamcinolone (MYCOLOG II) cream Apply topically daily as needed        ondansetron (ZOFRAN) 4 MG tablet Take 1 tablet by mouth every 6 hours as needed Reported on 3/20/2017       order for DME Equipment being ordered: Compression stockings - Knee High; 20-30 mmHg compression - note would like adhesive band to keep the stocking from sliding down 3 each 0     order for DME Equipment being ordered: Oral appliance for sleep apnea 1 Units 0     pantoprazole (PROTONIX) 40 MG EC tablet Take 40 mg by mouth 2 times daily       SYNTHROID 112 MCG tablet Take 1 tablet (112 mcg) by mouth daily Take 112 mcg daily except for Friday takes only 56 mcg.  Brand name Synthroid 90 tablet 3     tolterodine ER (DETROL LA) 4 MG 24 hr capsule Take 1 capsule (4 mg) by mouth daily 30 capsule 11     tretinoin (RETIN-A) 0.025 % external cream Use every night as tolerated - spot treat lesion 20 g 3     cephALEXin (KEFLEX) 500 MG capsule Take 1 capsule (500 mg) by mouth 4 times daily 28 capsule 0     doxycycline hyclate (VIBRAMYCIN) 100 MG capsule Take 1 capsule (100 mg) by mouth 2 times daily 14 capsule 0     pregabalin (LYRICA) 25 MG capsule Take 1 capsule (25 mg) by mouth 2 times daily 28 capsule 0     pregabalin (LYRICA) 50 MG capsule Take 1 capsule (50 mg) by mouth 2 times daily 60 capsule 5     Semaglutide, 1 MG/DOSE, 4 MG/3ML SOPN Inject 1 mg Subcutaneous once a week 3 mL 11     TRESIBA FLEXTOUCH 100 UNIT/ML pen Inject 25 Units Subcutaneous every morning 5 mL 11       Histories reviewed and updated in Epic.  Past Medical History:   Diagnosis Date     Abdominal adhesions 1984, 96,99    s/p lysis     Abdominal adhesions      Acne rosacea      Allergic rhinitis      Allergic rhinitis, cause unspecified      Alopecia      Anemia      CAD (coronary artery disease)      Carotid stenosis      CKD (chronic kidney disease) stage 2, GFR 60-89 ml/min      Colostomy in  place (H)      CPAP (continuous positive airway pressure) dependence      Depression      Diabetic gastroparesis (H)      Diet-controlled type 2 diabetes mellitus (H)      DM2 (diabetes mellitus, type 2) (H)      DVT (deep venous thrombosis) (H)      DVT of axillary vein, acute right (H)      Fibromyalgia      Gastro-oesophageal reflux disease      GERD (gastroesophageal reflux disease)      Hernia of unspecified site of abdominal cavity without mention of obstruction or gangrene     bilateral     Hernia, abdominal      History of blood transfusion     10/ 1980     History of thrombophlebitis      HTN (hypertension)      HTN (hypertension)      Hypertriglyceridemia      Hypertriglyceridemia      Hypokalemia      Hypothyroidism      Mumps      Obstructive sleep apnea      ANNETTE (obstructive sleep apnea)      ANNETTE on CPAP      Osteoarthritis of glenohumeral joint      Papillary carcinoma of thyroid (H)     s/p thyroidectomy - [facility]     Papillary carcinoma of thyroid (H)      PE (pulmonary embolism)      Poliomyelitis     poor circulation right leg     Poliomyelitis      Postsurgical hypothyroidism     s/p papillary thryoid cancer - Ruegemer     Pulmonary embolism (H)      Pulmonary embolus (H)      Pulmonary nodule      Rosacea      S/P carpal tunnel release     bilateral     S/P hardware removal 01/2014    still with lingering foot pain     S/P shoulder surgery     bilateral     Septic joint (H)     right knee     Septic joint of right knee joint (H)      Venous insufficiency      Venous insufficiency      Venous thrombosis 1999    right axillary vein       Past Surgical History:   Procedure Laterality Date     AMPUTATE TOE(S)  03/15/2012    Procedure:AMPUTATE TOE(S); Surgeon:RUBEN MOSES; Location:[facility] OR     AMPUTATE TOE(S)       APPENDECTOMY  1972     APPENDECTOMY       ARTHRODESIS FOOT  03/15/2012    Procedure:ARTHRODESIS FOOT; RIGHT TRIPLE ARTHRODESIS, FIFTH TOE AMPUTATION, LATERAL LIGAMENT  RECONSTRUCTION, TENDON TRANSFER AND RELEASE [MINI C-ARM, ARTHREX 4.5 AND 6.7 STAPLES, BIOCOMPOSITE TENODESIS SCREWS]; Surgeon:SUKHDEEP METZ; Location: OR     ARTHRODESIS FOOT Right     Right foot triple arthrodesis and removal of hardware     ARTHROSCOPY SHOULDER  06/25/2015    REVISION SUBACROMIAL DECOMPRESSION, EXCISION OF GANGLION CYST, DEBRIDEMENT AND EXCISION OF THE GLENOHUMERAL JOINT GANGLION CYST, CORACOID DECOMPRESSION, POSSIBLE SUBSCAPULARIS REPAIR AND OPEN SUBSCAPULARIS BICEP     ARTHROSCOPY SHOULDER ROTATOR CUFF REPAIR       BIOPSY  Thyroid 2002     BLADDER SURGERY       BREAST SURGERY  Biopsy     CHOLECYSTECTOMY       CHOLECYSTECTOMY       COLONOSCOPY  2018     COLOSTOMY  02/07/2012    Procedure:COLOSTOMY; CREATION OF SIGMOID COLOSTOMY AND EXTENSIVE  LYSIS OF ADHESIONS; Surgeon:MONTSERRAT BENDER; Location: OR     COLOSTOMY       CYSTOSCOPY       EYE SURGERY       GENITOURINARY SURGERY  1999     GI SURGERY      weakened rectal sphincter with artificial stimulator     HERNIA REPAIR  1976     HYSTERECTOMY TOTAL ABDOMINAL       LAPAROTOMY, LYSIS ADHESIONS, COMBINED  02/07/2012    Procedure:COMBINED LAPAROTOMY, LYSIS ADHESIONS; Surgeon:MONTSERRAT BENDER; Location: OR     RELEASE CARPAL TUNNEL       RELEASE TENDON FOOT  03/15/2012    Procedure:RELEASE TENDON FOOT; Surgeon:SUKHDEEP METZ; Location: OR     REMOVE HARDWARE FOOT  12/13/2012    Procedure: REMOVE HARDWARE FOOT;  RIGHT FOOT REMOVAL OF HARDWARE;  Surgeon: Sukhdeep Metz MD;  Location:  OR     SHOULDER SURGERY  11/12/2020    LEFT SHOULDER HEMIARHTROPLASTY, BICEP TENODESIS     SOFT TISSUE SURGERY  2018, 2020     TENDON RELEASE      foot     THYROIDECTOMY       ZC FREEING BOWEL ADHESION,ENTEROLYSIS      1986, 1996, 1999     ZZ NONSPECIFIC PROCEDURE      throidectomy     ZZC TOTAL ABDOM HYSTERECTOMY  1980    + BSO       Allergies:  Nsaids, Toradol, Celecoxib, Codeine, Crestor [rosuvastatin], No clinical  screening - see comments, Vioxx, Conjugated estrogens, and Sulfa drugs    Social History     Tobacco Use     Smoking status: Never     Smokeless tobacco: Never   Substance Use Topics     Alcohol use: Never       Family History   Problem Relation Age of Onset     Arthritis Mother      Hypertension Mother      Cerebrovascular Disease Mother      Obesity Mother      Heart Disease Mother         MI's     Hypertension Father      Respiratory Father         Adult RDS     Diabetes Father      Arthritis Sister      Cancer Sister      Diabetes Sister      Hypertension Sister      Breast Cancer Sister      Depression Sister      Thyroid Disease Sister      Obesity Sister      Arthritis Sister      Hypertension Sister      Thyroid Disease Sister      Osteoporosis Sister      Obesity Sister      Cancer Sister         colon polup     Hypertension Sister      Osteoporosis Sister      Obesity Sister      Colon Cancer Sister      Lipids Sister      Obesity Sister      Obesity Maternal Grandmother         Dad s mother     Skin Cancer Maternal Grandmother         skin cancer unknown     Cancer Maternal Grandmother         unknown skin cancer on face     Obesity Paternal Grandmother         Mothers mother     Heart Disease Brother         MI at 54     Other Cancer Brother         Lung & bone     Lipids Brother      Hypertension Brother      Diabetes Brother      Hyperlipidemia Brother      Ovarian Cancer No family hx of           REVIEW OF SYSTEMS:   ROS: 10 point ROS neg other than the symptoms noted above in the HPI.      EXAM:  Vitals: There were no vitals taken for this visit.    BMIE= There is no height or weight on file to calculate BMI.  Physical Exam (visual exam)  VS:  no vital signs taken for video visit  CONSTITUTIONAL: healthy, alert and NAD, responding appropriately  ENT: normocephalic, no visual evidence of trauma, normal nose and oral mucosa  EYES: conjunctivae and sclerae normal, no exophthalmos or proptosis  THYROID:   no visualized nodules or goiter  LUNGS: no audible wheeze, cough or visible cyanosis, no visible retractions or increased work of breathing  EXTREMITIES: no hand tremors  NEUROLOGY: cranial nerves grossly intact with no obvious deficit.  SKIN:  no visualized skin lesions or rash, no edema visualized  PSYCH: mentation appears normal, normal judgement      Neck Ultrasound 8/2022:  FINDINGS: Postoperative changes of thyroidectomy are noted. No  evidence for a recurrent soft tissue lesion in the thyroidectomy bed.  No worrisome lesions are identified in the neck. Ultrasound scanning  through the region of the left neck palpable abnormality demonstrates  a normal-appearing left submandibular gland. Incidentally noted  atherosclerotic plaque is noted in the left internal carotid artery.                                                                      IMPRESSION:    1. No convincing evidence for recurrent malignancy in the neck.   2. The left neck palpable abnormality appears to correspond to a  normal left submandibular gland. If there is continued clinical  concern for a suspicious neck mass, consider contrast-enhanced CT or  MRI of the neck for further evaluation.    US Neck 8/2022:  FINDINGS: Postoperative changes of thyroidectomy are noted. No  evidence for a recurrent soft tissue lesion in the thyroidectomy bed.  No worrisome lesions are identified in the neck. Ultrasound scanning  through the region of the left neck palpable abnormality demonstrates  a normal-appearing left submandibular gland. Incidentally noted  atherosclerotic plaque is noted in the left internal carotid artery.                                                                      IMPRESSION:    1. No convincing evidence for recurrent malignancy in the neck.   2. The left neck palpable abnormality appears to correspond to a  normal left submandibular gland. If there is continued clinical  concern for a suspicious neck mass, consider  contrast-enhanced CT or  MRI of the neck for further evaluation.      ASSESSMENT/PLAN:  No diagnosis found.  No orders of the defined types were placed in this encounter.      1) Diabetes Mellitus   - Glucose Control- NOT at goal,   - ContinueTresiba 25u every morning, if AM BG 90 or less, take 12u   - Restart Januvia 50mg daily.    - New RX for Freestyle daryn 3  - Cardiovascular Risk- continue ezetimibe, fenofibrate,   - Ophthalmology- uptodate, instructed to return Nov 2022, no NPDR.  - Podiatry- patient instructed on routine foot care, follows with podiatry  - Renal- Uptodate,  continue lisinopril    2) Cardiovascular Risk-   - Continue ezetimibe, fenofibrate.   - Continue icosapent ethyl 1g BID, triglycerides improved  - Continue aspirin 81mg daily     3) Hypothyroidism, postsurgical  - TSH/FT4 at goal  - Continue LT4 112mcg     4) History of Papillary Thyroid Cancer  - U/S and TG/TGab show biochemically and structurally complete response  - Now 20 years out  - enlarged LN, - no recurrence on ultrasound    RTC- 3 months    A total of 29 minutes were spent today 03/21/23 on this visit including chart review, history and counseling, documentation and other activities as detailed above.     Answers for HPI/ROS submitted by the patient on 3/20/2023  General Symptoms: Yes  Skin Symptoms: Yes  HENT Symptoms: Yes  EYE SYMPTOMS: Yes  HEART SYMPTOMS: Yes  LUNG SYMPTOMS: Yes  INTESTINAL SYMPTOMS: No  URINARY SYMPTOMS: Yes  GYNECOLOGIC SYMPTOMS: No  BREAST SYMPTOMS: No  SKELETAL SYMPTOMS: Yes  BLOOD SYMPTOMS: No  NERVOUS SYSTEM SYMPTOMS: Yes  MENTAL HEALTH SYMPTOMS: No  Ear pain: No  Ear discharge: No  Hearing loss: No  Tinnitus: No  Nosebleeds: No  Congestion: Yes  Sinus pain: No  Trouble swallowing: No   Voice hoarseness: Yes  Mouth sores: Yes  Sore throat: Yes  Tooth pain: No  Gum tenderness: No  Bleeding gums: No  Change in taste: No  Change in sense of smell: No  Dry mouth: Yes  Hearing aid used: No  Neck lump:  Yes  Fever: No  Loss of appetite: No  Weight loss: No  Weight gain: No  Fatigue: Yes  Night sweats: Yes  Chills: Yes  Increased stress: Yes  Excessive hunger: No  Excessive thirst: Yes  Feeling hot or cold when others believe the temperature is normal: No  Loss of height: No  Post-operative complications: No  Surgical site pain: No  Hallucinations: No  Change in or Loss of Energy: Yes  Hyperactivity: No  Confusion: No  Changes in hair: No  Changes in moles/birth marks: No  Itching: Yes  Rashes: No  Changes in nails: Yes  Acne: No  Hair in places you don't want it: No  Change in facial hair: No  Warts: No  Non-healing sores: No  Scarring: No  Flaking of skin: Yes  Color changes of hands/feet in cold : Yes  Sun sensitivity: No  Skin thickening: No  Eye pain: No  Vision loss: No  Dry eyes: Yes  Watery eyes: No  Eye bulging: No  Double vision: Yes  Flashing of lights: No  Spots: No  Floaters: Yes  Redness: No  Crossed eyes: No  Tunnel Vision: No  Yellowing of eyes: No  Eye irritation: Yes  Chest pain or pressure: Yes  Fast or irregular heartbeat: No  Pain in legs with walking: No  Trouble breathing while lying down: No  Fingers or toes appear blue: Yes  High blood pressure: Yes  Low blood pressure: No  Fainting: No  Murmurs: Yes  Pacemaker: No  Varicose veins: No  Edema or swelling: Yes  Wake up at night with shortness of breath: No  Light-headedness: Yes  Exercise intolerance: No  Cough: Yes  Sputum or phlegm: Yes  Coughing up blood: No  Difficulty breating or shortness of breath: No  Snoring: No  Wheezing: No  Difficulty breathing on exertion: No  Nighttime Cough: No  Difficulty breathing when lying flat: No  Trouble holding urine or incontinence: Yes  Pain or burning: No  Trouble starting or stopping: Yes  Increased frequency of urination: No  Blood in urine: No  Decreased frequency of urination: No  Frequent nighttime urination: No  Flank pain: No  Difficulty emptying bladder: Yes  Back pain: Yes  Muscle aches:  Yes  Neck pain: Yes  Swollen joints: No  Joint pain: No  Bone pain: No  Muscle cramps: No  Muscle weakness: Yes  Joint stiffness: No  Bone fracture: No  Trouble with coordination: No  Dizziness or trouble with balance: Yes  Fainting or black-out spells: No  Memory loss: No  Headache: No  Seizures: No  Speech problems: No  Tingling: No  Tremor: No  Weakness: Yes  Difficulty walking: Yes  Paralysis: No  Numbness: No

## 2023-03-21 NOTE — LETTER
3/21/2023         RE: Chasity Hodgson  [address] [facility] [address]        Dear Colleague,    Thank you for referring your patient, Chasity Hodgson, to the [facility] [place]. Please see a copy of my visit note below.      Video-Visit Details    Type of service:  Video Visit  Video Start Time: 1:10  Video End Time:1:38  Originating Location (pt. Location): [place], [place]  Distant Location (provider location):  Home  Platform used for Video Visit: Darrell Weston MD    Chasity Hodgson is a 77 year old year old female here for evaluation of Diabetes via a billable video visit.SHe also has PMHx of Diabetes Mellitus, and PTC/post surgical hypothyrodisim. She also has past medical istory of ANNETTE, gastroparesis, obesity,  Last seen by me Nov 2022    INTERVAL HISTORY:  - Tresiba no longer covered by insurance, working with insurance to find coverage or patient assistance for this, working with pharmacy to sort this out  - noting discrepancy when wearing daryn 3 on L arm vs R arm, registering lower when wearing on L arm vs R  - Stopped ozempic with worsening nausea/vomiting gastroparesis symptoms- did have improvement after stopping ozempic  -  Swelling has improved since stopped Lyrica      1) Diabetes Mellitus    Diabetes History:  Diagnosis: 2015, picked up on routine labs  Hospitalizations: None  Previous Regimens: Farxia (difficulty with frequent urination), Toujeo (was working well but insurance formulary changed 1/1/2021) At highest was at 48u daily, and then had profound low. At time of discontinuing, was down to 5u of toujeo. Previously on metformin. Glipizide  Current Regimen: Tresiba 25u daily, if under 150 when goes to dose, will take only 14,     BG check- Freestyle Libre3  Trends-   Overall high-- ave 163 (lower than August at 193, and May 224)                Complications: Gastroparesis- previously on reglan, overall not impactful    Last eye exam: Nov 22, 2021- no retinopathy,  has cataracts  Foot Exam: [facility], checks every 60 days   Blood pressure- Lisinopril 40mg daily, Atenolol 50mg daily  Lipids- ezetimibe 10 mg daily, fenofibrate 145mg daily  SIOBHAN last 7/7- 25.52    2) Hypothyroidism s/p thyroidectomy for PTC  Diagnosis: known 3 nodules that were being monitored, and in 2002 s/p thyroidectomy (nodule in isthmus was cancer), s/p BUTLER (per patient, unclear if clean margins so received BUTLER, unknown dose)  Treatment: Levothyroxine 112mcg  Residual tissue on R that has been monitored, not growing.   Last ultrasound 10/2021- no tissue seen  last biomarkers 10/7/2021- TG <0.1, TGAb <0.4      INTERVAL HISTORY-- has felt enlarging L side of her neck, ? Lymph node  Stable for 4-5 months     3) Vitamin D Deficiency  - Taking 2 tab daily  - Most recent 7/7/2021- 73  - PTH 9 (9/16/2021)--> recheck 21 10/21/2022  - Recent Vit D 81,     4) Hypertriglyceridemia  - On ezetimibe and fenofibrate, tried statin before with myalgias  - Eats minimal processed foods, minimal fried foods/baked treats  - Improved to 183 recently    BP Readings from Last 3 Encounters:   02/03/23 138/71   01/20/23 (!) 147/71   01/13/23 (!) 147/74       Lab Results   Component Value Date    A1C 9.6 01/11/2022    A1C 9.5 10/07/2021    A1C 9.0 08/02/2021    A1C 8.7 07/07/2021    A1C 6.2 11/27/2020    A1C 6.8 09/10/2019       Recent Labs   Lab Test 01/11/22  1359 03/29/19  1355 08/01/17  0000 02/15/17  0000 02/11/16  0000 03/31/15  1327   CHOL 177 196   < > 142   < > 158   HDL 34* 27*   < > 31   < > 34*   LDL 81 99   < > 49   < > 51   TRIG 308* 349*   < > 309   < > 365*   CHOLHDLRATIO  --   --   --  4.6  --  4.6    < > = values in this interval not displayed.       Lab Results   Component Value Date    MICROL 36 01/11/2022    MICROL 23 07/07/2021     No results found for: MICROALBUMIN        Wt Readings from Last 3 Encounters:   02/03/23 97.1 kg (214 lb)   01/20/23 97.1 kg (214 lb 1.6 oz)   01/13/23 96 kg (211 lb 11.2  oz)       Current Outpatient Medications   Medication Sig Dispense Refill     amLODIPine (NORVASC) 2.5 MG tablet Take 2.5mg in AM, 2.5mg at noon, and 5mg at bedtime 360 tablet 3     aspirin (ASA) 81 MG EC tablet Take 81 mg by mouth daily       atenolol (TENORMIN) 50 MG tablet TAKE ONE TABLET BY MOUTH TWICE A  tablet 3     azelastine (ASTEPRO) 0.15 % nasal spray 1 spray       Cholecalciferol (VITAMIN D-3 PO) Take 2 tablets by mouth       clotrimazole (LOTRIMIN) 1 % cream Apply topically daily       Continuous Blood Gluc Sensor (FREESTYLE BRANDY 3 SENSOR) MISC 1 each every 14 days 6 each 3     diphenhydrAMINE-acetaminophen (TYLENOL PM)  MG tablet Take 1 tablet by mouth At Bedtime Reported on 3/20/2017       ezetimibe (ZETIA) 10 MG tablet Take 1 tablet (10 mg) by mouth daily 90 tablet 3     famotidine (PEPCID) 20 MG tablet Take 2 tablets (40 mg) by mouth as needed 180 tablet 3     fenofibrate (TRICOR) 145 MG tablet Take 1 tablet (145 mg) by mouth daily 90 tablet 3     ferrous sulfate (IRON) 325 (65 FE) MG tablet Take 325 mg by mouth daily (with breakfast)       fexofenadine (ALLEGRA) 180 MG tablet Take 180 mg by mouth daily. 120 0     fluocinolone acetonide (DERMA SMOOTHE/FS BODY) 0.01 % external oil Apply 2 mL to scalp once per week. Massage into scalp. Can be left in overnight or washed out after 4-6 hours. 118.28 mL 5     fluticasone (FLONASE) 50 MCG/ACT spray Spray 2 sprays in nostril daily 2 sprays in each nostril qd 1 Bottle 0     furosemide (LASIX) 20 MG tablet Take 1 tablet (20 mg) by mouth daily as needed (lower extremity edema) 90 tablet 3     Icosapent Ethyl 1 g CAPS Take 1 g by mouth 2 times daily 60 capsule 11     insulin degludec (TRESIBA FLEXTOUCH) 100 UNIT/ML pen Inject 25 Units Subcutaneous every morning. 5 mL 0     insulin pen needle (B-D U/F) 31G X 5 MM miscellaneous Use 1 pen needles daily or as directed. 90 each 3     lisinopril (ZESTRIL) 40 MG tablet Take 1 tablet (40 mg) by mouth  daily 90 tablet 3     MULTIVITAMINS OR TABS ONE DAILY 100 3     nitroGLYcerin (NITROSTAT) 0.4 MG sublingual tablet Place 1 tablet (0.4 mg) under the tongue every 5 minutes as needed for chest pain (no more than 3 in one hour; after 3rd, call 911.) 25 tablet 3     nystatin (MYCOSTATIN) cream Apply topically daily as needed        nystatin-triamcinolone (MYCOLOG II) cream Apply topically daily as needed        ondansetron (ZOFRAN) 4 MG tablet Take 1 tablet by mouth every 6 hours as needed Reported on 3/20/2017       order for DME Equipment being ordered: Compression stockings - Knee High; 20-30 mmHg compression - note would like adhesive band to keep the stocking from sliding down 3 each 0     order for DME Equipment being ordered: Oral appliance for sleep apnea 1 Units 0     pantoprazole (PROTONIX) 40 MG EC tablet Take 40 mg by mouth 2 times daily       SYNTHROID 112 MCG tablet Take 1 tablet (112 mcg) by mouth daily Take 112 mcg daily except for Friday takes only 56 mcg.  Brand name Synthroid 90 tablet 3     tolterodine ER (DETROL LA) 4 MG 24 hr capsule Take 1 capsule (4 mg) by mouth daily 30 capsule 11     tretinoin (RETIN-A) 0.025 % external cream Use every night as tolerated - spot treat lesion 20 g 3     cephALEXin (KEFLEX) 500 MG capsule Take 1 capsule (500 mg) by mouth 4 times daily 28 capsule 0     doxycycline hyclate (VIBRAMYCIN) 100 MG capsule Take 1 capsule (100 mg) by mouth 2 times daily 14 capsule 0     pregabalin (LYRICA) 25 MG capsule Take 1 capsule (25 mg) by mouth 2 times daily 28 capsule 0     pregabalin (LYRICA) 50 MG capsule Take 1 capsule (50 mg) by mouth 2 times daily 60 capsule 5     Semaglutide, 1 MG/DOSE, 4 MG/3ML SOPN Inject 1 mg Subcutaneous once a week 3 mL 11     TRESIBA FLEXTOUCH 100 UNIT/ML pen Inject 25 Units Subcutaneous every morning 5 mL 11       Histories reviewed and updated in Epic.  Past Medical History:   Diagnosis Date     Abdominal adhesions 1984, 96,99    s/p lysis      Abdominal adhesions      Acne rosacea      Allergic rhinitis      Allergic rhinitis, cause unspecified      Alopecia      Anemia      CAD (coronary artery disease)      Carotid stenosis      CKD (chronic kidney disease) stage 2, GFR 60-89 ml/min      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)      CPAP (continuous positive airway pressure) dependence      Depression      Diabetic gastroparesis (H)      Diet-controlled type 2 diabetes mellitus (H)      DM2 (diabetes mellitus, type 2) (H)      DVT (deep venous thrombosis) (H)      DVT of axillary vein, acute right (H)      Fibromyalgia      Gastro-oesophageal reflux disease      GERD (gastroesophageal reflux disease)      Hernia of unspecified site of abdominal cavity without mention of obstruction or gangrene     bilateral     Hernia, abdominal      History of blood transfusion     10/ 1980     History of thrombophlebitis      HTN (hypertension)      HTN (hypertension)      Hypertriglyceridemia      Hypertriglyceridemia      Hypokalemia      Hypothyroidism      Mumps      Obstructive sleep apnea      ANNETTE (obstructive sleep apnea)      ANNETTE on CPAP      Osteoarthritis of glenohumeral joint      Papillary carcinoma of thyroid (H)     s/p thyroidectomy - Ruegemer     Papillary carcinoma of thyroid (H)      PE (pulmonary embolism)      Poliomyelitis     poor circulation right leg     Poliomyelitis      Postsurgical hypothyroidism     s/p papillary thryoid cancer - Ruegemer     Pulmonary embolism (H)      Pulmonary embolus (H)      Pulmonary nodule      Rosacea      S/P carpal tunnel release     bilateral     S/P hardware removal 01/2014    still with lingering foot pain     S/P shoulder surgery     bilateral     Septic joint (H)     right knee     Septic joint of right knee joint (H)      Venous insufficiency      Venous insufficiency      Venous thrombosis 1999    right axillary vein       Past Surgical History:   Procedure Laterality Date     AMPUTATE TOE(S)  03/15/2012     Procedure:AMPUTATE TOE(S); Surgeon:SUKHDEEP METZ; Location: OR     AMPUTATE TOE(S)       APPENDECTOMY  1972     APPENDECTOMY       ARTHRODESIS FOOT  03/15/2012    Procedure:ARTHRODESIS FOOT; RIGHT TRIPLE ARTHRODESIS, FIFTH TOE AMPUTATION, LATERAL LIGAMENT RECONSTRUCTION, TENDON TRANSFER AND RELEASE [MINI C-ARM, ARTHREX 4.5 AND 6.7 STAPLES, BIOCOMPOSITE TENODESIS SCREWS]; Surgeon:SUKHDEEP METZ; Location: OR     ARTHRODESIS FOOT Right     Right foot triple arthrodesis and removal of hardware     ARTHROSCOPY SHOULDER  06/25/2015    REVISION SUBACROMIAL DECOMPRESSION, EXCISION OF GANGLION CYST, DEBRIDEMENT AND EXCISION OF THE GLENOHUMERAL JOINT GANGLION CYST, CORACOID DECOMPRESSION, POSSIBLE SUBSCAPULARIS REPAIR AND OPEN SUBSCAPULARIS BICEP     ARTHROSCOPY SHOULDER ROTATOR CUFF REPAIR       BIOPSY  Thyroid 2002     BLADDER SURGERY       BREAST SURGERY  Biopsy     CHOLECYSTECTOMY       CHOLECYSTECTOMY       COLONOSCOPY  2018     COLOSTOMY  02/07/2012    Procedure:COLOSTOMY; CREATION OF SIGMOID COLOSTOMY AND EXTENSIVE  LYSIS OF ADHESIONS; Surgeon:MONTSERRAT BENDER; Location: OR     COLOSTOMY       CYSTOSCOPY       EYE SURGERY       GENITOURINARY SURGERY  1999     GI SURGERY      weakened rectal sphincter with artificial stimulator     HERNIA REPAIR  1976     HYSTERECTOMY TOTAL ABDOMINAL       LAPAROTOMY, LYSIS ADHESIONS, COMBINED  02/07/2012    Procedure:COMBINED LAPAROTOMY, LYSIS ADHESIONS; Surgeon:MONTSERRAT BENDER; Location: OR     RELEASE CARPAL TUNNEL       RELEASE TENDON FOOT  03/15/2012    Procedure:RELEASE TENDON FOOT; Surgeon:SUKHDEEP METZ; Location: OR     REMOVE HARDWARE FOOT  12/13/2012    Procedure: REMOVE HARDWARE FOOT;  RIGHT FOOT REMOVAL OF HARDWARE;  Surgeon: Sukhdeep Metz MD;  Location:  OR     SHOULDER SURGERY  11/12/2020    LEFT SHOULDER HEMIARHTROPLASTY, BICEP TENODESIS     SOFT TISSUE SURGERY  2018, 2020     TENDON RELEASE      foot      THYROIDECTOMY       ZZC FREEING BOWEL ADHESION,ENTEROLYSIS      1986, 1996, 1999     ZZC NONSPECIFIC PROCEDURE      throidectomy     ZZC TOTAL ABDOM HYSTERECTOMY  1980    + BSO       Allergies:  Nsaids, Toradol, Celecoxib, Codeine, Crestor [rosuvastatin], No clinical screening - see comments, Vioxx, Conjugated estrogens, and Sulfa drugs    Social History     Tobacco Use     Smoking status: Never     Smokeless tobacco: Never   Substance Use Topics     Alcohol use: Never       Family History   Problem Relation Age of Onset     Arthritis Mother      Hypertension Mother      Cerebrovascular Disease Mother      Obesity Mother      Heart Disease Mother         MI's     Hypertension Father      Respiratory Father         Adult RDS     Diabetes Father      Arthritis Sister      Cancer Sister      Diabetes Sister      Hypertension Sister      Breast Cancer Sister      Depression Sister      Thyroid Disease Sister      Obesity Sister      Arthritis Sister      Hypertension Sister      Thyroid Disease Sister      Osteoporosis Sister      Obesity Sister      Cancer Sister         colon polup     Hypertension Sister      Osteoporosis Sister      Obesity Sister      Colon Cancer Sister      Lipids Sister      Obesity Sister      Obesity Maternal Grandmother         Dad s mother     Skin Cancer Maternal Grandmother         skin cancer unknown     Cancer Maternal Grandmother         unknown skin cancer on face     Obesity Paternal Grandmother         Mothers mother     Heart Disease Brother         MI at 54     Other Cancer Brother         Lung & bone     Lipids Brother      Hypertension Brother      Diabetes Brother      Hyperlipidemia Brother      Ovarian Cancer No family hx of           REVIEW OF SYSTEMS:   ROS: 10 point ROS neg other than the symptoms noted above in the HPI.      EXAM:  Vitals: There were no vitals taken for this visit.    BMIE= There is no height or weight on file to calculate BMI.  Physical Exam (visual  exam)  VS:  no vital signs taken for video visit  CONSTITUTIONAL: healthy, alert and NAD, responding appropriately  ENT: normocephalic, no visual evidence of trauma, normal nose and oral mucosa  EYES: conjunctivae and sclerae normal, no exophthalmos or proptosis  THYROID:  no visualized nodules or goiter  LUNGS: no audible wheeze, cough or visible cyanosis, no visible retractions or increased work of breathing  EXTREMITIES: no hand tremors  NEUROLOGY: cranial nerves grossly intact with no obvious deficit.  SKIN:  no visualized skin lesions or rash, no edema visualized  PSYCH: mentation appears normal, normal judgement      Neck Ultrasound 8/2022:  FINDINGS: Postoperative changes of thyroidectomy are noted. No  evidence for a recurrent soft tissue lesion in the thyroidectomy bed.  No worrisome lesions are identified in the neck. Ultrasound scanning  through the region of the left neck palpable abnormality demonstrates  a normal-appearing left submandibular gland. Incidentally noted  atherosclerotic plaque is noted in the left internal carotid artery.                                                                      IMPRESSION:    1. No convincing evidence for recurrent malignancy in the neck.   2. The left neck palpable abnormality appears to correspond to a  normal left submandibular gland. If there is continued clinical  concern for a suspicious neck mass, consider contrast-enhanced CT or  MRI of the neck for further evaluation.    US Neck 8/2022:  FINDINGS: Postoperative changes of thyroidectomy are noted. No  evidence for a recurrent soft tissue lesion in the thyroidectomy bed.  No worrisome lesions are identified in the neck. Ultrasound scanning  through the region of the left neck palpable abnormality demonstrates  a normal-appearing left submandibular gland. Incidentally noted  atherosclerotic plaque is noted in the left internal carotid artery.                                                                       IMPRESSION:    1. No convincing evidence for recurrent malignancy in the neck.   2. The left neck palpable abnormality appears to correspond to a  normal left submandibular gland. If there is continued clinical  concern for a suspicious neck mass, consider contrast-enhanced CT or  MRI of the neck for further evaluation.      ASSESSMENT/PLAN:  No diagnosis found.  No orders of the defined types were placed in this encounter.      1) Diabetes Mellitus   - Glucose Control- NOT at goal,   - ContinueTresiba 25u every morning, if AM BG 90 or less, take 12u   - Restart Januvia 50mg daily.    - New RX for Freestyle daryn 3  - Cardiovascular Risk- continue ezetimibe, fenofibrate,   - Ophthalmology- uptodate, instructed to return Nov 2022, no NPDR.  - Podiatry- patient instructed on routine foot care, follows with podiatry  - Renal- Uptodate,  continue lisinopril    2) Cardiovascular Risk-   - Continue ezetimibe, fenofibrate.   - Continue icosapent ethyl 1g BID, triglycerides improved  - Continue aspirin 81mg daily     3) Hypothyroidism, postsurgical  - TSH/FT4 at goal  - Continue LT4 112mcg     4) History of Papillary Thyroid Cancer  - U/S and TG/TGab show biochemically and structurally complete response  - Now 20 years out  - enlarged LN, - no recurrence on ultrasound    RTC- 3 months    A total of 29 minutes were spent today 03/21/23 on this visit including chart review, history and counseling, documentation and other activities as detailed above.     Answers for HPI/ROS submitted by the patient on 3/20/2023  General Symptoms: Yes  Skin Symptoms: Yes  HENT Symptoms: Yes  EYE SYMPTOMS: Yes  HEART SYMPTOMS: Yes  LUNG SYMPTOMS: Yes  INTESTINAL SYMPTOMS: No  URINARY SYMPTOMS: Yes  GYNECOLOGIC SYMPTOMS: No  BREAST SYMPTOMS: No  SKELETAL SYMPTOMS: Yes  BLOOD SYMPTOMS: No  NERVOUS SYSTEM SYMPTOMS: Yes  MENTAL HEALTH SYMPTOMS: No  Ear pain: No  Ear discharge: No  Hearing loss: No  Tinnitus: No  Nosebleeds: No  Congestion:  Yes  Sinus pain: No  Trouble swallowing: No   Voice hoarseness: Yes  Mouth sores: Yes  Sore throat: Yes  Tooth pain: No  Gum tenderness: No  Bleeding gums: No  Change in taste: No  Change in sense of smell: No  Dry mouth: Yes  Hearing aid used: No  Neck lump: Yes  Fever: No  Loss of appetite: No  Weight loss: No  Weight gain: No  Fatigue: Yes  Night sweats: Yes  Chills: Yes  Increased stress: Yes  Excessive hunger: No  Excessive thirst: Yes  Feeling hot or cold when others believe the temperature is normal: No  Loss of height: No  Post-operative complications: No  Surgical site pain: No  Hallucinations: No  Change in or Loss of Energy: Yes  Hyperactivity: No  Confusion: No  Changes in hair: No  Changes in moles/birth marks: No  Itching: Yes  Rashes: No  Changes in nails: Yes  Acne: No  Hair in places you don't want it: No  Change in facial hair: No  Warts: No  Non-healing sores: No  Scarring: No  Flaking of skin: Yes  Color changes of hands/feet in cold : Yes  Sun sensitivity: No  Skin thickening: No  Eye pain: No  Vision loss: No  Dry eyes: Yes  Watery eyes: No  Eye bulging: No  Double vision: Yes  Flashing of lights: No  Spots: No  Floaters: Yes  Redness: No  Crossed eyes: No  Tunnel Vision: No  Yellowing of eyes: No  Eye irritation: Yes  Chest pain or pressure: Yes  Fast or irregular heartbeat: No  Pain in legs with walking: No  Trouble breathing while lying down: No  Fingers or toes appear blue: Yes  High blood pressure: Yes  Low blood pressure: No  Fainting: No  Murmurs: Yes  Pacemaker: No  Varicose veins: No  Edema or swelling: Yes  Wake up at night with shortness of breath: No  Light-headedness: Yes  Exercise intolerance: No  Cough: Yes  Sputum or phlegm: Yes  Coughing up blood: No  Difficulty breating or shortness of breath: No  Snoring: No  Wheezing: No  Difficulty breathing on exertion: No  Nighttime Cough: No  Difficulty breathing when lying flat: No  Trouble holding urine or incontinence: Yes  Pain or  burning: No  Trouble starting or stopping: Yes  Increased frequency of urination: No  Blood in urine: No  Decreased frequency of urination: No  Frequent nighttime urination: No  Flank pain: No  Difficulty emptying bladder: Yes  Back pain: Yes  Muscle aches: Yes  Neck pain: Yes  Swollen joints: No  Joint pain: No  Bone pain: No  Muscle cramps: No  Muscle weakness: Yes  Joint stiffness: No  Bone fracture: No  Trouble with coordination: No  Dizziness or trouble with balance: Yes  Fainting or black-out spells: No  Memory loss: No  Headache: No  Seizures: No  Speech problems: No  Tingling: No  Tremor: No  Weakness: Yes  Difficulty walking: Yes  Paralysis: No  Numbness: No          Again, thank you for allowing me to participate in the care of your patient.        Sincerely,        Odette Weston MD

## 2023-03-28 ENCOUNTER — OFFICE VISIT (OUTPATIENT)
Dept: UROLOGY | Facility: CLINIC | Age: 78
End: 2023-03-28
Payer: MEDICARE

## 2023-03-28 VITALS — BODY MASS INDEX: 42.01 KG/M2 | HEIGHT: 60 IN | WEIGHT: 214 LBS

## 2023-03-28 DIAGNOSIS — N39.42 URINARY INCONTINENCE WITHOUT SENSORY AWARENESS: ICD-10-CM

## 2023-03-28 DIAGNOSIS — N90.89 LABIA IRRITATION: ICD-10-CM

## 2023-03-28 DIAGNOSIS — N39.46 MIXED INCONTINENCE: Primary | ICD-10-CM

## 2023-03-28 PROCEDURE — 99213 OFFICE O/P EST LOW 20 MIN: CPT | Performed by: PHYSICIAN ASSISTANT

## 2023-03-28 ASSESSMENT — PAIN SCALES - GENERAL: PAINLEVEL: SEVERE PAIN (7)

## 2023-03-28 NOTE — PATIENT INSTRUCTIONS
Continue with pelvic floor PT and exercises.    Your exam is much better!    Continue Detrol 4 mg once daily. This medication will take about 4 weeks to work.     You have been prescribed medication to help relax your bladder.     This medication may help control (or improve) urinary frequency, urgency and urge incontinence.     Common side effects include:  Dry mouth (Biotene mouthwash can help with this.)  Constipation  Drowsiness/Mental Fogginess  Blurred vision/Dry Eyes  Difficulty with sweating     Rare side effect:  Urinary retention      Would consider cystoscopy and possible urodynamics study with Dr. Zendejas if continued issues.  Let me know if we need to do this.  You would like monitor for a few more weeks/months.    Contact us in the interim with questions, concerns, or changes in symptomatology.  [phone]
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
## 2023-03-28 NOTE — NURSING NOTE
Chief Complaint   Patient presents with     Incontinence     Pt. has been taking the Detrol which has been helping some, but she still has bad days with incontinence, but less often having incontinence than before.    Still wearing a pad, but has been better at getting to the bathroom on time.      Kinza Barksdale, EMT

## 2023-03-29 DIAGNOSIS — E11.9 TYPE 2 DIABETES, HBA1C GOAL < 7% (H): Primary | ICD-10-CM

## 2023-03-30 ENCOUNTER — THERAPY VISIT (OUTPATIENT)
Dept: PHYSICAL THERAPY | Facility: CLINIC | Age: 78
End: 2023-03-30
Payer: MEDICARE

## 2023-03-30 ENCOUNTER — HOSPITAL ENCOUNTER (OUTPATIENT)
Dept: OCCUPATIONAL THERAPY | Facility: CLINIC | Age: 78
Setting detail: THERAPIES SERIES
Discharge: HOME | End: 2023-03-30
Attending: INTERNAL MEDICINE
Payer: MEDICARE

## 2023-03-30 DIAGNOSIS — C73 PAPILLARY CARCINOMA OF THYROID (H): ICD-10-CM

## 2023-03-30 DIAGNOSIS — N39.42 URINARY INCONTINENCE WITHOUT SENSORY AWARENESS: Primary | ICD-10-CM

## 2023-03-30 PROCEDURE — 97110 THERAPEUTIC EXERCISES: CPT | Mod: GP | Performed by: PHYSICAL THERAPIST

## 2023-03-30 PROCEDURE — 97140 MANUAL THERAPY 1/> REGIONS: CPT | Mod: GO | Performed by: OCCUPATIONAL THERAPIST

## 2023-03-30 PROCEDURE — 97535 SELF CARE MNGMENT TRAINING: CPT | Mod: GP | Performed by: PHYSICAL THERAPIST

## 2023-03-30 RX ORDER — LEVOTHYROXINE SODIUM 112 MCG
TABLET ORAL
Qty: 90 TABLET | Refills: 3 | Status: SHIPPED | OUTPATIENT
Start: 2023-03-30 | End: 2023-04-12

## 2023-03-30 NOTE — TELEPHONE ENCOUNTER
"Last Written Prescription Date: 3/16/22  Last Fill Quantity: 90,  # refills: 3   Last office visit: 3/21/2023 with prescribing provider:  Dr. Weston   Future Office Visit: 6/21/23  Next 5 appointments (look out 90 days)    Apr 12, 2023 11:00 AM  (Arrive by 10:40 AM)  Provider Visit with Shola Benoit MD  [facility] ([facility] ) [address], [address]  [facility] [address] [phone]               Requested Prescriptions   Pending Prescriptions Disp Refills     SYNTHROID 112 MCG tablet [Pharmacy Med Name: SYNTHROID 112MCG TABS] 90 tablet 3     Sig: TAKE ONE-HALF TABLET BY MOUTH DAILY ON FRIDAYS AND TAKE ONE TABLET BY MOUTH ALL OTHER DAYS AS DIRECTED       Thyroid Protocol Passed - 3/30/2023  2:33 PM        Passed - Patient is 12 years or older        Passed - Recent (12 mo) or future (30 days) visit within the authorizing provider's specialty     Patient has had an office visit with the authorizing provider or a provider within the authorizing providers department within the previous 12 mos or has a future within next 30 days. See \"Patient Info\" tab in inbasket, or \"Choose Columns\" in Meds & Orders section of the refill encounter.              Passed - Medication is active on med list        Passed - Normal TSH on file in past 12 months     Recent Labs   Lab Test 03/09/23  1103   TSH 1.13              Passed - No active pregnancy on record     If patient is pregnant or has had a positive pregnancy test, please check TSH.          Passed - No positive pregnancy test in past 12 months     If patient is pregnant or has had a positive pregnancy test, please check TSH.             Refills sent  Gina Alvarez RN    "

## 2023-04-07 ENCOUNTER — OFFICE VISIT (OUTPATIENT)
Dept: CARDIOLOGY | Facility: CLINIC | Age: 78
End: 2023-04-07
Attending: INTERNAL MEDICINE
Payer: MEDICARE

## 2023-04-07 VITALS
BODY MASS INDEX: 41.43 KG/M2 | WEIGHT: 211 LBS | DIASTOLIC BLOOD PRESSURE: 58 MMHG | HEART RATE: 66 BPM | HEIGHT: 60 IN | SYSTOLIC BLOOD PRESSURE: 162 MMHG

## 2023-04-07 DIAGNOSIS — I10 BENIGN ESSENTIAL HYPERTENSION: ICD-10-CM

## 2023-04-07 DIAGNOSIS — R07.89 ATYPICAL CHEST PAIN: Primary | ICD-10-CM

## 2023-04-07 DIAGNOSIS — I25.10 CORONARY ARTERY DISEASE INVOLVING NATIVE CORONARY ARTERY OF NATIVE HEART WITHOUT ANGINA PECTORIS: ICD-10-CM

## 2023-04-07 DIAGNOSIS — E78.2 MIXED HYPERLIPIDEMIA: ICD-10-CM

## 2023-04-07 DIAGNOSIS — R00.2 PALPITATIONS: ICD-10-CM

## 2023-04-07 PROCEDURE — 99214 OFFICE O/P EST MOD 30 MIN: CPT | Performed by: NURSE PRACTITIONER

## 2023-04-07 RX ORDER — CARVEDILOL 12.5 MG/1
12.5 TABLET ORAL 2 TIMES DAILY WITH MEALS
Qty: 180 TABLET | Refills: 3 | Status: SHIPPED | OUTPATIENT
Start: 2023-04-07 | End: 2024-03-27

## 2023-04-07 NOTE — LETTER
4/7/2023    Shola Benoit MD, MD  [address]  [facility] [address]    RE: Chasity Hodgson       Dear Colleague,     I had the pleasure of seeing Chasity Hodgson in the [facility].  Cardiology Clinic Progress Note  Chasity Hodgson MRN# [id number]   YOB: 1945 Age: 77 year old     Reason For Visit:  Follow up   Primary Cardiologist:   Dr. Bhatia           History of Presenting Illness:      Chasity Hodgson is a pleasant 77 year old patient who carries a past medical history significant for nonocclusive coronary artery disease, chronic kidney disease, hypertension, PAD, diabetes, hyperlipidemia, hypothyroidism, obstructive sleep apnea, and childhood polio.    She returns to the office today for 6-month follow-up.  Over the last few months she has noticed progressive left-sided chest pain (under breast), radiating into her armpit.  Symptoms were only occasional and now are daily.  She also notes frequent heart pounding, but not irregular.  She denies shortness of breath, PND, orthopnea, presyncope, or syncope.  She has chronic bilateral lower extremity edema.    Recent echocardiogram showed a normal ejection fraction estimated at 65 to 70%, normal RV size and function, no significant valvular disease.  Coronary angiogram in 2018 showed minimal nonocclusive CAD.    Blood pressure is elevated at 180/861 with repeat reading reduced to 162/58  Recent BMP showed a sodium of 143, potassium 5.4, BUN 37.3, creatinine 1.31, and GFR 42  CBC was unremarkable  A1c -7.1  Lipid panel showed a total cholesterol of 155, HDL 39, LDL 79, and triglycerides 183  BMI 41.21, 211 pounds    She does not engage in any routine exercise.  Walks with a wheeled walker.   Remains compliant with all medications.                   Assessment and Plan:     1.  Atypical chest pain -coronary angiogram in 2018 showed nonocclusive coronary disease.  Concerning for ischemia, recommend nuclear stress test for  evaluation.  Recent echocardiogram showed a normal ejection fraction estimated at 65 to 70%, normal RV size and function, no significant valvular disease.    2.  Palpitations-  will get a 3-day Zio patch monitor for evaluation of arrhythmia  3.  Hypertension -suboptimal, recommend switching atenolol to carvedilol 12.5 mg twice daily.  Continue lisinopril.  4.  Hyperlipidemia -on ezetimibe and fenofibrate.  Intolerant to statins  5.  Chronic lower extremity edema -as needed Lasix           Thank you for allowing me to participate in this delightful patient's care. I have recommended she follow up in 1 month with CHRIS for review of results.       Amber Chan, APRN CNP         Review of Systems:     Review of Systems:  Skin:        Eyes:       ENT:       Respiratory:  Negative    Cardiovascular:    chest pain;Positive for;edema;fatigue  Gastroenterology:      Genitourinary:       Musculoskeletal:       Neurologic:       Psychiatric:       Heme/Lymph/Imm:       Endocrine:                   Physical Exam:     GEN:  In general, this is a obese female in no acute distress.  HEENT:  Pupils equal, round. Sclerae nonicteric. Clear oropharynx. Mucous membranes moist.  NECK: Supple, no masses appreciated. Trachea midline.  No JVD   C/V:  Regular rate and rhythm, no murmur, rub or gallop. No S3 or RV heave.   RESP: Respirations are unlabored. No use of accessory muscles. Clear to auscultation bilaterally without wheezing, rales, or rhonchi.  GI: Abdomen soft, nontender, nondistended. No HSM appreciated.   EXTREM: 1+ bilateral LE edema. No cyanosis or clubbing.  NEURO: Alert and oriented, cooperative. Gait steady with wheeled walker. No obvious focal deficits.   PSYCH: Normal affect.  SKIN: Warm and dry. No rashes or petechiae appreciated.          Past Medical History:     Past Medical History:   Diagnosis Date    Abdominal adhesions 1984, 96,99    s/p lysis    Abdominal adhesions     Acne rosacea     Allergic rhinitis      Allergic rhinitis, cause unspecified     Alopecia     Anemia     CAD (coronary artery disease)     Carotid stenosis     CKD (chronic kidney disease) stage 2, GFR 60-89 ml/min     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)     CPAP (continuous positive airway pressure) dependence     Depression     Diabetic gastroparesis (H)     Diet-controlled type 2 diabetes mellitus (H)     DM2 (diabetes mellitus, type 2) (H)     DVT (deep venous thrombosis) (H)     DVT of axillary vein, acute right (H)     Fibromyalgia     Gastro-oesophageal reflux disease     GERD (gastroesophageal reflux disease)     Hernia of unspecified site of abdominal cavity without mention of obstruction or gangrene     bilateral    Hernia, abdominal     History of blood transfusion     10/ 1980    History of thrombophlebitis     HTN (hypertension)     HTN (hypertension)     Hypertriglyceridemia     Hypertriglyceridemia     Hypokalemia     Hypothyroidism     Mumps     Obstructive sleep apnea     ANNETTE (obstructive sleep apnea)     ANNETTE on CPAP     Osteoarthritis of glenohumeral joint     Papillary carcinoma of thyroid (H)     s/p thyroidectomy - Ruegemer    Papillary carcinoma of thyroid (H)     PE (pulmonary embolism)     Poliomyelitis     poor circulation right leg    Poliomyelitis     Postsurgical hypothyroidism     s/p papillary thryoid cancer - Ruegemer    Pulmonary embolism (H)     Pulmonary embolus (H)     Pulmonary nodule     Rosacea     S/P carpal tunnel release     bilateral    S/P hardware removal 01/2014    still with lingering foot pain    S/P shoulder surgery     bilateral    Septic joint (H)     right knee    Septic joint of right knee joint (H)     Venous insufficiency     Venous insufficiency     Venous thrombosis 1999    right axillary vein              Past Surgical History:     Past Surgical History:   Procedure Laterality Date    AMPUTATE TOE(S)  03/15/2012    Procedure:AMPUTATE TOE(S); Surgeon:RUBEN MOSES; Location:[facility] OR    AMPUTATE TOE(S)       APPENDECTOMY  1972    APPENDECTOMY      ARTHRODESIS FOOT  03/15/2012    Procedure:ARTHRODESIS FOOT; RIGHT TRIPLE ARTHRODESIS, FIFTH TOE AMPUTATION, LATERAL LIGAMENT RECONSTRUCTION, TENDON TRANSFER AND RELEASE [MINI C-ARM, ARTHREX 4.5 AND 6.7 STAPLES, BIOCOMPOSITE TENODESIS SCREWS]; Surgeon:SUKHDEEP METZ; Location: OR    ARTHRODESIS FOOT Right     Right foot triple arthrodesis and removal of hardware    ARTHROSCOPY SHOULDER  06/25/2015    REVISION SUBACROMIAL DECOMPRESSION, EXCISION OF GANGLION CYST, DEBRIDEMENT AND EXCISION OF THE GLENOHUMERAL JOINT GANGLION CYST, CORACOID DECOMPRESSION, POSSIBLE SUBSCAPULARIS REPAIR AND OPEN SUBSCAPULARIS BICEP    ARTHROSCOPY SHOULDER ROTATOR CUFF REPAIR      BIOPSY  Thyroid 2002    BLADDER SURGERY      BREAST SURGERY  Biopsy    CHOLECYSTECTOMY      CHOLECYSTECTOMY      COLONOSCOPY  2018    COLOSTOMY  02/07/2012    Procedure:COLOSTOMY; CREATION OF SIGMOID COLOSTOMY AND EXTENSIVE  LYSIS OF ADHESIONS; Surgeon:MONTSERRAT BENDER; Location: OR    COLOSTOMY      CYSTOSCOPY      EYE SURGERY      GENITOURINARY SURGERY  1999    GI SURGERY      weakened rectal sphincter with artificial stimulator    HERNIA REPAIR  1976    HYSTERECTOMY TOTAL ABDOMINAL      LAPAROTOMY, LYSIS ADHESIONS, COMBINED  02/07/2012    Procedure:COMBINED LAPAROTOMY, LYSIS ADHESIONS; Surgeon:MONTSERRAT BENDER; Location: OR    RELEASE CARPAL TUNNEL      RELEASE TENDON FOOT  03/15/2012    Procedure:RELEASE TENDON FOOT; Surgeon:SUKHDEEP METZ; Location: OR    REMOVE HARDWARE FOOT  12/13/2012    Procedure: REMOVE HARDWARE FOOT;  RIGHT FOOT REMOVAL OF HARDWARE;  Surgeon: Sukhdeep Metz MD;  Location:  OR    SHOULDER SURGERY  11/12/2020    LEFT SHOULDER HEMIARHTROPLASTY, BICEP TENODESIS    SOFT TISSUE SURGERY  2018, 2020    TENDON RELEASE      foot    THYROIDECTOMY      [facility] FREEING BOWEL ADHESION,ENTEROLYSIS      1986, 1996, 1999    [facility] NONSPECIFIC PROCEDURE      throidectomy     ZZC TOTAL ABDOM HYSTERECTOMY  1980    + BSO              Allergies:   Nsaids, Toradol, Celecoxib, Codeine, Crestor [rosuvastatin], No clinical screening - see comments, Vioxx, Conjugated estrogens, and Sulfa drugs       Data:   All laboratory data reviewed:    LAST CHOLESTEROL:  Lab Results   Component Value Date    CHOL 155 03/09/2023    CHOL 196 03/29/2019     Lab Results   Component Value Date    HDL 39 03/09/2023    HDL 27 03/29/2019     Lab Results   Component Value Date    LDL 79 03/09/2023    LDL 99 03/29/2019     Lab Results   Component Value Date    TRIG 183 03/09/2023    TRIG 349 03/29/2019     Lab Results   Component Value Date    CHOLHDLRATIO 4.6 02/15/2017    CHOLHDLRATIO 4.6 03/31/2015       LAST BMP:  Lab Results   Component Value Date     03/09/2023     07/07/2021      Lab Results   Component Value Date    POTASSIUM 5.4 03/09/2023    POTASSIUM 5.0 10/12/2022    POTASSIUM 4.7 07/07/2021     Lab Results   Component Value Date    CHLORIDE 108 03/09/2023    CHLORIDE 109 10/12/2022    CHLORIDE 107 07/07/2021     Lab Results   Component Value Date    RINKU 9.4 03/09/2023    RINKU 9.4 07/07/2021     Lab Results   Component Value Date    CO2 24 03/09/2023    CO2 23 10/12/2022    CO2 24 07/07/2021     Lab Results   Component Value Date    BUN 37.3 03/09/2023    BUN 41 10/12/2022    BUN 27 07/07/2021     Lab Results   Component Value Date    CR 1.31 03/09/2023    CR 1.01 07/07/2021     Lab Results   Component Value Date     03/09/2023     10/12/2022     07/07/2021       LAST CBC:  Lab Results   Component Value Date    WBC 9.5 03/09/2023    WBC 7.2 04/26/2021     Lab Results   Component Value Date    RBC 4.04 03/09/2023    RBC 3.77 04/26/2021     Lab Results   Component Value Date    HGB 11.9 03/09/2023    HGB 11.7 04/26/2021     Lab Results   Component Value Date    HCT 37.3 03/09/2023    HCT 35.0 04/26/2021     Lab Results   Component Value Date    MCV 92 03/09/2023    MCV 93  04/26/2021     Lab Results   Component Value Date    MCH 29.5 03/09/2023    MCH 31.0 04/26/2021     Lab Results   Component Value Date    MCHC 31.9 03/09/2023    MCHC 33.4 04/26/2021     Lab Results   Component Value Date    RDW 12.8 03/09/2023    RDW 12.5 04/26/2021     Lab Results   Component Value Date     03/09/2023     04/26/2021             Thank you for allowing me to participate in the care of your patient.      Sincerely,     [facility]  cc:   Ankit Bhatia MD  [address]  [place] [address]

## 2023-04-07 NOTE — PROGRESS NOTES
Cardiology Clinic Progress Note  Chasity Hodgson MRN# [id number]   YOB: 1945 Age: 77 year old     Reason For Visit:  Follow up   Primary Cardiologist:   Dr. Bhatia           History of Presenting Illness:      Chasity Hodgson is a pleasant 77 year old patient who carries a past medical history significant for nonocclusive coronary artery disease, chronic kidney disease, hypertension, PAD, diabetes, hyperlipidemia, hypothyroidism, obstructive sleep apnea, and childhood polio.    She returns to the office today for 6-month follow-up.  Over the last few months she has noticed progressive left-sided chest pain (under breast), radiating into her armpit.  Symptoms were only occasional and now are daily.  She also notes frequent heart pounding, but not irregular.  She denies shortness of breath, PND, orthopnea, presyncope, or syncope.  She has chronic bilateral lower extremity edema.    Recent echocardiogram showed a normal ejection fraction estimated at 65 to 70%, normal RV size and function, no significant valvular disease.  Coronary angiogram in 2018 showed minimal nonocclusive CAD.    Blood pressure is elevated at 180/861 with repeat reading reduced to 162/58  Recent BMP showed a sodium of 143, potassium 5.4, BUN 37.3, creatinine 1.31, and GFR 42  CBC was unremarkable  A1c -7.1  Lipid panel showed a total cholesterol of 155, HDL 39, LDL 79, and triglycerides 183  BMI 41.21, 211 pounds    She does not engage in any routine exercise.  Walks with a wheeled walker.   Remains compliant with all medications.                   Assessment and Plan:     1.  Atypical chest pain -coronary angiogram in 2018 showed nonocclusive coronary disease.  Concerning for ischemia, recommend nuclear stress test for evaluation.  Recent echocardiogram showed a normal ejection fraction estimated at 65 to 70%, normal RV size and function, no significant valvular disease.    2.  Palpitations-  will get a 3-day Zio patch monitor for  evaluation of arrhythmia  3.  Hypertension -suboptimal, recommend switching atenolol to carvedilol 12.5 mg twice daily.  Continue lisinopril.  4.  Hyperlipidemia -on ezetimibe and fenofibrate.  Intolerant to statins  5.  Chronic lower extremity edema -as needed Lasix           Thank you for allowing me to participate in this delightful patient's care. I have recommended she follow up in 1 month with CH[facility]IS for review of results.       Amber Chan, APRN CNP         Review of Systems:     Review of Systems:  Skin:        Eyes:       ENT:       Respiratory:  Negative    Cardiovascular:    chest pain;Positive for;edema;fatigue  Gastroenterology:      Genitourinary:       Musculoskeletal:       Neurologic:       Psychiatric:       Heme/Lymph/Imm:       Endocrine:                   Physical Exam:     GEN:  In general, this is a obese female in no acute distress.  HEENT:  Pupils equal, round. Sclerae nonicteric. Clear oropharynx. Mucous membranes moist.  NECK: Supple, no masses appreciated. Trachea midline.  No JVD   C/V:  Regular rate and rhythm, no murmur, rub or gallop. No S3 or RV heave.   RESP: Respirations are unlabored. No use of accessory muscles. Clear to auscultation bilaterally without wheezing, rales, or rhonchi.  GI: Abdomen soft, nontender, nondistended. No HSM appreciated.   EXTREM: 1+ bilateral LE edema. No cyanosis or clubbing.  NEURO: Alert and oriented, cooperative. Gait steady with wheeled walker. No obvious focal deficits.   PSYCH: Normal affect.  SKIN: Warm and dry. No rashes or petechiae appreciated.          Past Medical History:     Past Medical History:   Diagnosis Date     Abdominal adhesions 1984, 96,99    s/p lysis     Abdominal adhesions      Acne rosacea      Allergic rhinitis      Allergic rhinitis, cause unspecified      Alopecia      Anemia      CAD (coronary artery disease)      Carotid stenosis      CKD (chronic kidney disease) stage 2, GFR 60-89 ml/min      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)       CPAP (continuous positive airway pressure) dependence      Depression      Diabetic gastroparesis (H)      Diet-controlled type 2 diabetes mellitus (H)      DM2 (diabetes mellitus, type 2) (H)      DVT (deep venous thrombosis) (H)      DVT of axillary vein, acute right (H)      Fibromyalgia      Gastro-oesophageal reflux disease      GERD (gastroesophageal reflux disease)      Hernia of unspecified site of abdominal cavity without mention of obstruction or gangrene     bilateral     Hernia, abdominal      History of blood transfusion     10/ 1980     History of thrombophlebitis      HTN (hypertension)      HTN (hypertension)      Hypertriglyceridemia      Hypertriglyceridemia      Hypokalemia      Hypothyroidism      Mumps      Obstructive sleep apnea      ANNETTE (obstructive sleep apnea)      ANNETTE on CPAP      Osteoarthritis of glenohumeral joint      Papillary carcinoma of thyroid (H)     s/p thyroidectomy - Ruegemer     Papillary carcinoma of thyroid (H)      PE (pulmonary embolism)      Poliomyelitis     poor circulation right leg     Poliomyelitis      Postsurgical hypothyroidism     s/p papillary thryoid cancer - Ruegemer     Pulmonary embolism (H)      Pulmonary embolus (H)      Pulmonary nodule      Rosacea      S/P carpal tunnel release     bilateral     S/P hardware removal 01/2014    still with lingering foot pain     S/P shoulder surgery     bilateral     Septic joint (H)     right knee     Septic joint of right knee joint (H)      Venous insufficiency      Venous insufficiency      Venous thrombosis 1999    right axillary vein              Past Surgical History:     Past Surgical History:   Procedure Laterality Date     AMPUTATE TOE(S)  03/15/2012    Procedure:AMPUTATE TOE(S); Surgeon:RUBEN MOSES; Location:[facility] OR     AMPUTATE TOE(S)       APPENDECTOMY  1972     APPENDECTOMY       ARTHRODESIS FOOT  03/15/2012    Procedure:ARTHRODESIS FOOT; RIGHT TRIPLE ARTHRODESIS, FIFTH TOE AMPUTATION, LATERAL  LIGAMENT RECONSTRUCTION, TENDON TRANSFER AND RELEASE [MINI C-ARM, ARTHREX 4.5 AND 6.7 STAPLES, BIOCOMPOSITE TENODESIS SCREWS]; Surgeon:SUKHDEEP METZ; Location: OR     ARTHRODESIS FOOT Right     Right foot triple arthrodesis and removal of hardware     ARTHROSCOPY SHOULDER  06/25/2015    REVISION SUBACROMIAL DECOMPRESSION, EXCISION OF GANGLION CYST, DEBRIDEMENT AND EXCISION OF THE GLENOHUMERAL JOINT GANGLION CYST, CORACOID DECOMPRESSION, POSSIBLE SUBSCAPULARIS REPAIR AND OPEN SUBSCAPULARIS BICEP     ARTHROSCOPY SHOULDER ROTATOR CUFF REPAIR       BIOPSY  Thyroid 2002     BLADDER SURGERY       BREAST SURGERY  Biopsy     CHOLECYSTECTOMY       CHOLECYSTECTOMY       COLONOSCOPY  2018     COLOSTOMY  02/07/2012    Procedure:COLOSTOMY; CREATION OF SIGMOID COLOSTOMY AND EXTENSIVE  LYSIS OF ADHESIONS; Surgeon:MONTSERRAT BENDER; Location: OR     COLOSTOMY       CYSTOSCOPY       EYE SURGERY       GENITOURINARY SURGERY  1999     GI SURGERY      weakened rectal sphincter with artificial stimulator     HERNIA REPAIR  1976     HYSTERECTOMY TOTAL ABDOMINAL       LAPAROTOMY, LYSIS ADHESIONS, COMBINED  02/07/2012    Procedure:COMBINED LAPAROTOMY, LYSIS ADHESIONS; Surgeon:MONTSERRAT BENDER; Location: OR     RELEASE CARPAL TUNNEL       RELEASE TENDON FOOT  03/15/2012    Procedure:RELEASE TENDON FOOT; Surgeon:SUKHDEEP METZ; Location: OR     REMOVE HARDWARE FOOT  12/13/2012    Procedure: REMOVE HARDWARE FOOT;  RIGHT FOOT REMOVAL OF HARDWARE;  Surgeon: Sukhdeep Metz MD;  Location:  OR     SHOULDER SURGERY  11/12/2020    LEFT SHOULDER HEMIARHTROPLASTY, BICEP TENODESIS     SOFT TISSUE SURGERY  2018, 2020     TENDON RELEASE      foot     THYROIDECTOMY       [facility] FREEING BOWEL ADHESION,ENTEROLYSIS      1986, 1996, 1999     [facility] NONSPECIFIC PROCEDURE      throidectomy     ZZC TOTAL ABDOM HYSTERECTOMY  1980    + BSO              Allergies:   Nsaids, Toradol, Celecoxib, Codeine, Crestor [rosuvastatin],  No clinical screening - see comments, Vioxx, Conjugated estrogens, and Sulfa drugs       Data:   All laboratory data reviewed:    LAST CHOLESTEROL:  Lab Results   Component Value Date    CHOL 155 03/09/2023    CHOL 196 03/29/2019     Lab Results   Component Value Date    HDL 39 03/09/2023    HDL 27 03/29/2019     Lab Results   Component Value Date    LDL 79 03/09/2023    LDL 99 03/29/2019     Lab Results   Component Value Date    TRIG 183 03/09/2023    TRIG 349 03/29/2019     Lab Results   Component Value Date    CHOLHDLRATIO 4.6 02/15/2017    CHOLHDLRATIO 4.6 03/31/2015       LAST BMP:  Lab Results   Component Value Date     03/09/2023     07/07/2021      Lab Results   Component Value Date    POTASSIUM 5.4 03/09/2023    POTASSIUM 5.0 10/12/2022    POTASSIUM 4.7 07/07/2021     Lab Results   Component Value Date    CHLORIDE 108 03/09/2023    CHLORIDE 109 10/12/2022    CHLORIDE 107 07/07/2021     Lab Results   Component Value Date    RINKU 9.4 03/09/2023    RINKU 9.4 07/07/2021     Lab Results   Component Value Date    CO2 24 03/09/2023    CO2 23 10/12/2022    CO2 24 07/07/2021     Lab Results   Component Value Date    BUN 37.3 03/09/2023    BUN 41 10/12/2022    BUN 27 07/07/2021     Lab Results   Component Value Date    CR 1.31 03/09/2023    CR 1.01 07/07/2021     Lab Results   Component Value Date     03/09/2023     10/12/2022     07/07/2021       LAST CBC:  Lab Results   Component Value Date    WBC 9.5 03/09/2023    WBC 7.2 04/26/2021     Lab Results   Component Value Date    RBC 4.04 03/09/2023    RBC 3.77 04/26/2021     Lab Results   Component Value Date    HGB 11.9 03/09/2023    HGB 11.7 04/26/2021     Lab Results   Component Value Date    HCT 37.3 03/09/2023    HCT 35.0 04/26/2021     Lab Results   Component Value Date    MCV 92 03/09/2023    MCV 93 04/26/2021     Lab Results   Component Value Date    MCH 29.5 03/09/2023    MCH 31.0 04/26/2021     Lab Results   Component Value Date     MCHC 31.9 03/09/2023    MCHC 33.4 04/26/2021     Lab Results   Component Value Date    RDW 12.8 03/09/2023    RDW 12.5 04/26/2021     Lab Results   Component Value Date     03/09/2023     04/26/2021

## 2023-04-07 NOTE — PATIENT INSTRUCTIONS
"Thanks for participating in a office visit with the [facility] today.    Daily episodes of chest discomfort with rest. Recommend nuclear stress test for evaluation.   Complaints of \"heart thumping\" recommend 3 day zio patch monitor.   Blood pressure sub optimal, switch atenolol to carvedilol 12.5 mg twice daily   Monitor blood pressures daily with a goal of < 140/90  Encourage exercise, weight loss, and strict low salt diet    Follow up in 1 month with CHRIS for BP recheck and review of results.     Please call my nurse at  [phone] with any questions or concerns.    Scheduling phone number: [phone]  Reminder: Please bring in all current medications, over the counter supplements and vitamin bottles to your next appointment.       "

## 2023-04-12 ENCOUNTER — OFFICE VISIT (OUTPATIENT)
Dept: FAMILY MEDICINE | Facility: CLINIC | Age: 78
End: 2023-04-12
Payer: MEDICARE

## 2023-04-12 VITALS
WEIGHT: 212 LBS | HEIGHT: 60 IN | HEART RATE: 71 BPM | BODY MASS INDEX: 41.62 KG/M2 | OXYGEN SATURATION: 100 % | DIASTOLIC BLOOD PRESSURE: 66 MMHG | TEMPERATURE: 97.8 F | SYSTOLIC BLOOD PRESSURE: 138 MMHG | RESPIRATION RATE: 16 BRPM

## 2023-04-12 DIAGNOSIS — D63.1 ANEMIA OF CHRONIC RENAL FAILURE, STAGE 3A (H): ICD-10-CM

## 2023-04-12 DIAGNOSIS — Z79.4 TYPE 2 DIABETES MELLITUS WITH OTHER SPECIFIED COMPLICATION, WITH LONG-TERM CURRENT USE OF INSULIN (H): ICD-10-CM

## 2023-04-12 DIAGNOSIS — C73 PAPILLARY CARCINOMA OF THYROID (H): ICD-10-CM

## 2023-04-12 DIAGNOSIS — N18.31 CHRONIC KIDNEY DISEASE (CKD) STAGE G3A/A1, MODERATELY DECREASED GLOMERULAR FILTRATION RATE (GFR) BETWEEN 45-59 ML/MIN/1.73 SQUARE METER AND ALBUMINURIA CREATININE RATIO LESS THAN 30 MG/G (H): ICD-10-CM

## 2023-04-12 DIAGNOSIS — N18.31 ANEMIA OF CHRONIC RENAL FAILURE, STAGE 3A (H): ICD-10-CM

## 2023-04-12 DIAGNOSIS — E55.9 AVITAMINOSIS D: ICD-10-CM

## 2023-04-12 DIAGNOSIS — E11.21 DIABETIC NEPHROPATHY ASSOCIATED WITH TYPE 2 DIABETES MELLITUS (H): Primary | ICD-10-CM

## 2023-04-12 DIAGNOSIS — E11.69 TYPE 2 DIABETES MELLITUS WITH OTHER SPECIFIED COMPLICATION, WITH LONG-TERM CURRENT USE OF INSULIN (H): ICD-10-CM

## 2023-04-12 DIAGNOSIS — I10 BENIGN ESSENTIAL HYPERTENSION: ICD-10-CM

## 2023-04-12 LAB
ANION GAP SERPL CALCULATED.3IONS-SCNC: 12 MMOL/L (ref 7–15)
BUN SERPL-MCNC: 35.1 MG/DL (ref 8–23)
CALCIUM SERPL-MCNC: 9.3 MG/DL (ref 8.8–10.2)
CHLORIDE SERPL-SCNC: 104 MMOL/L (ref 98–107)
CREAT SERPL-MCNC: 1.31 MG/DL (ref 0.51–0.95)
DEPRECATED HCO3 PLAS-SCNC: 23 MMOL/L (ref 22–29)
GFR SERPL CREATININE-BSD FRML MDRD: 42 ML/MIN/1.73M2
GLUCOSE SERPL-MCNC: 176 MG/DL (ref 70–99)
POTASSIUM SERPL-SCNC: 4.7 MMOL/L (ref 3.4–5.3)
SODIUM SERPL-SCNC: 139 MMOL/L (ref 136–145)
TSH SERPL DL<=0.005 MIU/L-ACNC: 1.31 UIU/ML (ref 0.3–4.2)

## 2023-04-12 PROCEDURE — 80048 BASIC METABOLIC PNL TOTAL CA: CPT | Performed by: INTERNAL MEDICINE

## 2023-04-12 PROCEDURE — 36415 COLL VENOUS BLD VENIPUNCTURE: CPT | Performed by: INTERNAL MEDICINE

## 2023-04-12 PROCEDURE — 99214 OFFICE O/P EST MOD 30 MIN: CPT | Performed by: INTERNAL MEDICINE

## 2023-04-12 PROCEDURE — 84443 ASSAY THYROID STIM HORMONE: CPT | Performed by: INTERNAL MEDICINE

## 2023-04-12 PROCEDURE — 82306 VITAMIN D 25 HYDROXY: CPT | Performed by: INTERNAL MEDICINE

## 2023-04-12 RX ORDER — LEVOTHYROXINE SODIUM 112 MCG
TABLET ORAL
Qty: 90 TABLET | Refills: 3 | Status: SHIPPED | OUTPATIENT
Start: 2023-04-12 | End: 2023-09-27

## 2023-04-12 ASSESSMENT — PAIN SCALES - GENERAL: PAINLEVEL: EXTREME PAIN (8)

## 2023-04-12 NOTE — PATIENT INSTRUCTIONS
(E11.21) Diabetic nephropathy associated with type 2 diabetes mellitus (H)  (primary encounter diagnosis)  Comment: Doing well with blood pressure.  We will check basic metabolic panel today as well as TSH and follow up accordingly.  No change in insulin or Januvia  Plan: Basic metabolic panel  (Ca, Cl, CO2, Creat,         Gluc, K, Na, BUN)            (C73) Papillary carcinoma of thyroid (H)  Comment: Check TSH today  Plan: SYNTHROID 112 MCG tablet, TSH with free T4         reflex            (E11.69,  Z79.4) Type 2 diabetes mellitus with other specified complication, with long-term current use of insulin (H)  Comment: Continue medications.  Eye exam upcoming   Plan:     (I10) Benign essential hypertension  Comment: blood pressure is stable - follow up in cardiology.  OK To take lasix as needed   Plan:

## 2023-04-12 NOTE — PROGRESS NOTES
"    Raheem Cochran is a 77 year old, presenting for the following health issues:  Follow Up      History of Present Illness       Hypertension: She presents for follow up of hypertension.  She does check blood pressure  regularly outside of the clinic. Outside blood pressures have been over 140/90. She follows a low salt diet.     Headaches:   Since the patient's last clinic visit, headaches are: worsened  The patient is getting headaches:  Couple times a day  She is able to do normal daily activities when she has a migraine.  The patient is taking the following rescue/relief medications:  Tylenol   Patient states \"The relief is inconsistent\" from the rescue/relief medications.   The patient is taking the following medications to prevent migraines:  No medications to prevent migraines  In the past 4 weeks, the patient has gone to an Urgent Care or Emergency Room 0 times times due to headaches.    Reason for visit:  Legs swelling, medications, headaches, keep dropping things on the floor, biting inside of my mouth and tongue, often get chills while sleeping    She eats 2-3 servings of fruits and vegetables daily.She consumes 0 sweetened beverage(s) daily.She exercises with enough effort to increase her heart rate 9 or less minutes per day.  She exercises with enough effort to increase her heart rate 3 or less days per week.   She is taking medications regularly.        Diabetic nephropathy associated with type 2 diabetes mellitus (H)   She has stopped gabapentin to help with edema and has switched to acetaminophen and and will follow up in pain clinic  Papillary carcinoma of thyroid (H)  Type 2 diabetes mellitus with other specified complication, with long-term current use of insulin (H)   Has had follow up in endocrinology and recommended to stop ozempic and start Januvia. She finds that her blood glucose has been stable with this change, but wonders if Januvia could also cause edema.    Benign essential " hypertension   Chasity Hodgson did stop amlodipine and switched from atenolol to carvedilol and blood pressure has been a bit higher at times, but relatively stable.  She will be following with cardiology.  Since stopping amlodipine her swelling has improved.   She is taking lasxi 20 mg as needed which has helped.         Review of Systems   Constitutional, HEENT, cardiovascular, pulmonary, GI, , musculoskeletal, neuro, skin, endocrine and psych systems are negative, except as otherwise noted.      Objective    BP (!) 157/79 (BP Location: Right arm)   Pulse 71   Temp 97.8  F (36.6  C)   Resp 16   Ht 1.524 m (5')   Wt 96.2 kg (212 lb)   SpO2 100%   BMI 41.40 kg/m    Body mass index is 41.4 kg/m .  Physical Exam   GENERAL: healthy, alert and no distress  EYES: Eyes grossly normal to inspection,    NECK: no adenopathy, no asymmetry, masses, or scars and thyroid normal to palpation  RESP: lungs clear to auscultation - no rales, rhonchi or wheezes  CV: regular rate and rhythm, normal S1 S2, no S3 or S4, no murmur, click or rub, 2+ peripheral edema   ABDOMEN: obese, soft, nontender, no hepatosplenomegaly   MS: no gross musculoskeletal defects noted, no edema  SKIN: no suspicious lesions or rashes  NEURO: Normal strength and tone, mentation intact and speech normal  PSYCH: mentation appears normal, affect normal/bright    Recent Results (from the past 240 hour(s))   Basic metabolic panel  (Ca, Cl, CO2, Creat, Gluc, K, Na, BUN)    Collection Time: 04/12/23 11:23 AM   Result Value Ref Range    Sodium 139 136 - 145 mmol/L    Potassium 4.7 3.4 - 5.3 mmol/L    Chloride 104 98 - 107 mmol/L    Carbon Dioxide (CO2) 23 22 - 29 mmol/L    Anion Gap 12 7 - 15 mmol/L    Urea Nitrogen 35.1 (H) 8.0 - 23.0 mg/dL    Creatinine 1.31 (H) 0.51 - 0.95 mg/dL    Calcium 9.3 8.8 - 10.2 mg/dL    Glucose 176 (H) 70 - 99 mg/dL    GFR Estimate 42 (L) >60 mL/min/1.73m2   TSH with free T4 reflex    Collection Time: 04/12/23 11:23 AM   Result  Value Ref Range    TSH 1.31 0.30 - 4.20 uIU/mL         Patient Instructions   (E11.21) Diabetic nephropathy associated with type 2 diabetes mellitus (H)  (primary encounter diagnosis)  Comment: Doing well with blood pressure.  We will check basic metabolic panel today as well as TSH and follow up accordingly.  No change in insulin or Januvia  Plan: Basic metabolic panel  (Ca, Cl, CO2, Creat,         Gluc, K, Na, BUN)            (C73) Papillary carcinoma of thyroid (H)  Comment: Check TSH today  Plan: SYNTHROID 112 MCG tablet, TSH with free T4         reflex            (E11.69,  Z79.4) Type 2 diabetes mellitus with other specified complication, with long-term current use of insulin (H)  Comment: Continue medications.  Eye exam upcoming   Plan:     (I10) Benign essential hypertension  Comment: blood pressure is stable - follow up in cardiology.  OK To take lasix as needed   Plan:

## 2023-04-13 LAB — DEPRECATED CALCIDIOL+CALCIFEROL SERPL-MC: 64 UG/L (ref 20–75)

## 2023-04-13 NOTE — RESULT ENCOUNTER NOTE
Gerard Gaspar,    I have had the opportunity to review your recent results and an interpretation is as follows:  Your follow up basic metabolic panel shows stable renal function and electrolytes  Your thyroid function is stable on your current dose of levothyroxine     Sincerely,  Shola Benoit MD

## 2023-04-14 ENCOUNTER — HOSPITAL ENCOUNTER (OUTPATIENT)
Dept: CARDIOLOGY | Facility: CLINIC | Age: 78
Discharge: HOME | End: 2023-04-14
Attending: NURSE PRACTITIONER
Payer: MEDICARE

## 2023-04-14 ENCOUNTER — HOSPITAL ENCOUNTER (OUTPATIENT)
Dept: NUCLEAR MEDICINE | Facility: CLINIC | Age: 78
Setting detail: NUCLEAR MEDICINE
Discharge: HOME | End: 2023-04-14
Attending: NURSE PRACTITIONER
Payer: MEDICARE

## 2023-04-14 ENCOUNTER — HOSPITAL ENCOUNTER (OUTPATIENT)
Dept: OCCUPATIONAL THERAPY | Facility: CLINIC | Age: 78
Setting detail: THERAPIES SERIES
Discharge: HOME | End: 2023-04-14
Attending: INTERNAL MEDICINE
Payer: MEDICARE

## 2023-04-14 DIAGNOSIS — I25.10 CORONARY ARTERY DISEASE INVOLVING NATIVE CORONARY ARTERY OF NATIVE HEART WITHOUT ANGINA PECTORIS: ICD-10-CM

## 2023-04-14 DIAGNOSIS — R07.89 ATYPICAL CHEST PAIN: ICD-10-CM

## 2023-04-14 DIAGNOSIS — E11.9 TYPE 2 DIABETES, HBA1C GOAL < 7% (H): ICD-10-CM

## 2023-04-14 LAB
CV STRESS MAX HR HE: 100
RATE PRESSURE PRODUCT: NORMAL
STRESS ECHO BASELINE DIASTOLIC HE: 93
STRESS ECHO BASELINE HR: 79 BPM
STRESS ECHO BASELINE SYSTOLIC BP: 201
STRESS ECHO CALCULATED PERCENT HR: 69 %
STRESS ECHO LAST STRESS DIASTOLIC BP: 85
STRESS ECHO LAST STRESS SYSTOLIC BP: 162
STRESS ECHO TARGET HR: 144

## 2023-04-14 PROCEDURE — G1010 CDSM STANSON: HCPCS

## 2023-04-14 PROCEDURE — 97140 MANUAL THERAPY 1/> REGIONS: CPT | Mod: GO | Performed by: OCCUPATIONAL THERAPIST

## 2023-04-14 PROCEDURE — 999N000127 HC STATISTIC PERIPHERAL IV START W US GUIDANCE

## 2023-04-14 PROCEDURE — 78452 HT MUSCLE IMAGE SPECT MULT: CPT | Mod: 26 | Performed by: INTERNAL MEDICINE

## 2023-04-14 PROCEDURE — 343N000001 HC RX 343: Performed by: NURSE PRACTITIONER

## 2023-04-14 PROCEDURE — 78452 HT MUSCLE IMAGE SPECT MULT: CPT | Mod: ME

## 2023-04-14 PROCEDURE — 93016 CV STRESS TEST SUPVJ ONLY: CPT | Performed by: INTERNAL MEDICINE

## 2023-04-14 PROCEDURE — 93017 CV STRESS TEST TRACING ONLY: CPT

## 2023-04-14 PROCEDURE — A9502 TC99M TETROFOSMIN: HCPCS | Performed by: NURSE PRACTITIONER

## 2023-04-14 PROCEDURE — 93018 CV STRESS TEST I&R ONLY: CPT | Performed by: INTERNAL MEDICINE

## 2023-04-14 PROCEDURE — 250N000011 HC RX IP 250 OP 636: Performed by: NURSE PRACTITIONER

## 2023-04-14 PROCEDURE — G1010 CDSM STANSON: HCPCS | Performed by: INTERNAL MEDICINE

## 2023-04-14 RX ORDER — ALBUTEROL SULFATE 90 UG/1
2 AEROSOL, METERED RESPIRATORY (INHALATION) EVERY 5 MIN PRN
Status: DISCONTINUED | OUTPATIENT
Start: 2023-04-14 | End: 2023-04-15 | Stop reason: HOSPADM

## 2023-04-14 RX ORDER — AMINOPHYLLINE 25 MG/ML
50-100 INJECTION, SOLUTION INTRAVENOUS
Status: DISCONTINUED | OUTPATIENT
Start: 2023-04-14 | End: 2023-04-15 | Stop reason: HOSPADM

## 2023-04-14 RX ORDER — ACYCLOVIR 200 MG/1
0-1 CAPSULE ORAL
Status: DISCONTINUED | OUTPATIENT
Start: 2023-04-14 | End: 2023-04-15 | Stop reason: HOSPADM

## 2023-04-14 RX ORDER — REGADENOSON 0.08 MG/ML
0.4 INJECTION, SOLUTION INTRAVENOUS ONCE
Status: COMPLETED | OUTPATIENT
Start: 2023-04-14 | End: 2023-04-14

## 2023-04-14 RX ADMIN — TETROFOSMIN 10 MILLICURIE: 1.38 INJECTION, POWDER, LYOPHILIZED, FOR SOLUTION INTRAVENOUS at 09:07

## 2023-04-14 RX ADMIN — TETROFOSMIN 30 MILLICURIE: 1.38 INJECTION, POWDER, LYOPHILIZED, FOR SOLUTION INTRAVENOUS at 11:05

## 2023-04-14 RX ADMIN — REGADENOSON 0.4 MG: 0.08 INJECTION, SOLUTION INTRAVENOUS at 11:04

## 2023-04-17 DIAGNOSIS — E11.9 TYPE 2 DIABETES, HBA1C GOAL < 7% (H): ICD-10-CM

## 2023-04-17 RX ORDER — INSULIN DEGLUDEC 100 U/ML
INJECTION, SOLUTION SUBCUTANEOUS
Qty: 5 ML | Refills: 1 | Status: SHIPPED | OUTPATIENT
Start: 2023-04-17 | End: 2023-05-03

## 2023-04-17 RX ORDER — INSULIN DEGLUDEC 100 U/ML
INJECTION, SOLUTION SUBCUTANEOUS
Qty: 3 ML | OUTPATIENT
Start: 2023-04-17

## 2023-04-18 ENCOUNTER — HOSPITAL ENCOUNTER (OUTPATIENT)
Dept: CARDIOLOGY | Facility: CLINIC | Age: 78
Discharge: HOME | End: 2023-04-18
Attending: NURSE PRACTITIONER | Admitting: NURSE PRACTITIONER
Payer: MEDICARE

## 2023-04-18 DIAGNOSIS — R00.2 PALPITATIONS: ICD-10-CM

## 2023-04-18 PROCEDURE — 93244 EXT ECG>48HR<7D REV&INTERPJ: CPT | Performed by: NURSE PRACTITIONER

## 2023-04-18 PROCEDURE — 93242 EXT ECG>48HR<7D RECORDING: CPT

## 2023-04-21 ENCOUNTER — HOSPITAL ENCOUNTER (OUTPATIENT)
Dept: OCCUPATIONAL THERAPY | Facility: CLINIC | Age: 78
Setting detail: THERAPIES SERIES
Discharge: HOME | End: 2023-04-21
Attending: INTERNAL MEDICINE
Payer: MEDICARE

## 2023-04-21 PROCEDURE — 97140 MANUAL THERAPY 1/> REGIONS: CPT | Mod: GO | Performed by: OCCUPATIONAL THERAPIST

## 2023-04-23 NOTE — PROGRESS NOTES
[facility]    Outpatient Occupational Therapy Discharge Note  Patient: Chasity Hodgson  : 1945    Beginning/End Dates of Reporting Period:  23 to 23    Referring Provider: Shola Gamboa Diagnosis: lymphedema    Client Self Report:      Objective Measurements: see flowsheets                                                        Outcome Measures (most recent score): 29 post score from 20 pre score indicates negative benefit of services       Goals:   Goal Identifier Velcro/reducing compression wearing   Goal Description Pt will tolerate velcro compression approx 23 hrs/day to prevent re-acccumulation of extracellular fluid/max reductions in BLE lymphedema to reduce risk infections and promote better walking   Target Date 23   Date Met   (goal not met)   Progress (detail required for progress note):       Goal Identifier MLD/HEP   Goal Description Demonstrate independence in performing prescribed exercises to facilate the lymph system and muscle pumping systems for max reductions needed to reduce risk skin infections and reduce RLE for better fit AFO   Target Date 23   Date Met  23   Progress (detail required for progress note):       Goal Identifier Reductions   Goal Description Pt will display a total BLE volume reduciton of 1L+ for reductions needed tor educe risk skin infections and help improve fit RLE AFO   Target Date 23   Date Met   (not met)   Progress (detail required for progress note):       Goal Identifier     Goal Description     Target Date     Date Met      Progress (detail required for progress note):       Goal Identifier     Goal Description     Target Date     Date Met      Progress (detail required for progress note):       Goal Identifier     Goal Description     Target Date     Date Met      Progress (detail required for progress note):        Goal Identifier     Goal Description     Target Date     Date Met      Progress (detail required for progress note):       Goal Identifier     Goal Description     Target Date     Date Met      Progress (detail required for progress note):         Plan:  Discharge from therapy. Pt has enhanced her home management plan and Ind and will be receiving compression pump to help her managem her BLE lymphedema. Pt has improved her compression use and has HEP to help manage her situation as well. No further support can be provided in clinic; discharge pt.    Discharge:    Reason for Discharge: No further expectation of progress.    Equipment Issued:     Discharge Plan: Patient to continue home program.

## 2023-04-23 NOTE — PROGRESS NOTES
[facility]    OUTPATIENT OCCUPATIONAL THERAPY  PLAN OF TREATMENT FOR OUTPATIENT REHABILITATION AND PROGRESS NOTE    Patient's Last Name, First Name, Chasity Child Date of Birth  1945   Provider's Name  [facility] Medical Record No.  [id number]    Onset Date  1/6/23 Start of Care Date  1/17/23   Type:     __PT   _X_OT   __SLP Medical Diagnosis  lymphedema   OT Diagnosis  lymphedema Plan of Treatment  Frequency/Duration: 1x/wk x 1 week  Certification date from 4/15/23 to 4/28/23     Goals:  Goal Identifier Velcro/reducing compression wearing   Goal Description Pt will tolerate velcro compression approx 23 hrs/day to prevent re-acccumulation of extracellular fluid/max reductions in BLE lymphedema to reduce risk infections and promote better walking   Target Date 04/14/23   Date Met   (goal not met)   Progress (detail required for progress note):       Goal Identifier MLD/HEP   Goal Description Demonstrate independence in performing prescribed exercises to facilate the lymph system and muscle pumping systems for max reductions needed to reduce risk skin infections and reduce RLE for better fit AFO   Target Date 04/14/23   Date Met  04/14/23   Progress (detail required for progress note):       Goal Identifier Reductions   Goal Description Pt will display a total BLE volume reduciton of 1L+ for reductions needed tor educe risk skin infections and help improve fit RLE AFO   Target Date 04/14/23   Date Met      Progress (detail required for progress note):       Goal Identifier     Goal Description     Target Date     Date Met      Progress (detail required for progress note):       Goal Identifier     Goal Description     Target Date     Date Met      Progress (detail required for progress note):       Goal Identifier     Goal Description     Target Date      Date Met      Progress (detail required for progress note):       Goal Identifier     Goal Description     Target Date     Date Met      Progress (detail required for progress note):       Goal Identifier     Goal Description     Target Date     Date Met      Progress (detail required for progress note):         Pt needs new cert to complete POC and ensure I with home management BLE lymphedema.       I CERTIFY THE NEED FOR THESE SERVICES FURNISHED UNDER        THIS PLAN OF TREATMENT AND WHILE UNDER MY CARE     (Physician co-signature of this document indicates review and certification of the therapy plan).                Referring Provider: Shola Zuluaga

## 2023-04-24 ENCOUNTER — MEDICAL CORRESPONDENCE (OUTPATIENT)
Dept: HEALTH INFORMATION MANAGEMENT | Facility: CLINIC | Age: 78
End: 2023-04-24

## 2023-04-25 ASSESSMENT — SLEEP AND FATIGUE QUESTIONNAIRES
HOW LIKELY ARE YOU TO NOD OFF OR FALL ASLEEP WHILE SITTING QUIETLY AFTER LUNCH WITHOUT ALCOHOL: SLIGHT CHANCE OF DOZING
HOW LIKELY ARE YOU TO NOD OFF OR FALL ASLEEP WHILE SITTING AND READING: MODERATE CHANCE OF DOZING
HOW LIKELY ARE YOU TO NOD OFF OR FALL ASLEEP WHILE LYING DOWN TO REST IN THE AFTERNOON WHEN CIRCUMSTANCES PERMIT: MODERATE CHANCE OF DOZING
HOW LIKELY ARE YOU TO NOD OFF OR FALL ASLEEP WHILE SITTING AND TALKING TO SOMEONE: WOULD NEVER DOZE
HOW LIKELY ARE YOU TO NOD OFF OR FALL ASLEEP WHILE LYING DOWN TO REST IN THE AFTERNOON WHEN CIRCUMSTANCES PERMIT: MODERATE CHANCE OF DOZING
HOW LIKELY ARE YOU TO NOD OFF OR FALL ASLEEP WHILE WATCHING TV: SLIGHT CHANCE OF DOZING
HOW LIKELY ARE YOU TO NOD OFF OR FALL ASLEEP WHEN YOU ARE A PASSENGER IN A CAR FOR AN HOUR WITHOUT A BREAK: SLIGHT CHANCE OF DOZING
HOW LIKELY ARE YOU TO NOD OFF OR FALL ASLEEP WHILE SITTING INACTIVE IN A PUBLIC PLACE: WOULD NEVER DOZE
HOW LIKELY ARE YOU TO NOD OFF OR FALL ASLEEP WHILE SITTING AND READING: MODERATE CHANCE OF DOZING
HOW LIKELY ARE YOU TO NOD OFF OR FALL ASLEEP WHILE SITTING QUIETLY AFTER LUNCH WITHOUT ALCOHOL: SLIGHT CHANCE OF DOZING
HOW LIKELY ARE YOU TO NOD OFF OR FALL ASLEEP IN A CAR, WHILE STOPPED FOR A FEW MINUTES IN TRAFFIC: WOULD NEVER DOZE
HOW LIKELY ARE YOU TO NOD OFF OR FALL ASLEEP WHILE SITTING AND READING: MODERATE CHANCE OF DOZING
HOW LIKELY ARE YOU TO NOD OFF OR FALL ASLEEP WHILE LYING DOWN TO REST IN THE AFTERNOON WHEN CIRCUMSTANCES PERMIT: MODERATE CHANCE OF DOZING
HOW LIKELY ARE YOU TO NOD OFF OR FALL ASLEEP WHILE SITTING QUIETLY AFTER LUNCH WITHOUT ALCOHOL: SLIGHT CHANCE OF DOZING
HOW LIKELY ARE YOU TO NOD OFF OR FALL ASLEEP WHEN YOU ARE A PASSENGER IN A CAR FOR AN HOUR WITHOUT A BREAK: SLIGHT CHANCE OF DOZING
HOW LIKELY ARE YOU TO NOD OFF OR FALL ASLEEP WHEN YOU ARE A PASSENGER IN A CAR FOR AN HOUR WITHOUT A BREAK: SLIGHT CHANCE OF DOZING
HOW LIKELY ARE YOU TO NOD OFF OR FALL ASLEEP WHILE WATCHING TV: SLIGHT CHANCE OF DOZING
HOW LIKELY ARE YOU TO NOD OFF OR FALL ASLEEP WHILE WATCHING TV: SLIGHT CHANCE OF DOZING
HOW LIKELY ARE YOU TO NOD OFF OR FALL ASLEEP WHILE SITTING INACTIVE IN A PUBLIC PLACE: WOULD NEVER DOZE
HOW LIKELY ARE YOU TO NOD OFF OR FALL ASLEEP IN A CAR, WHILE STOPPED FOR A FEW MINUTES IN TRAFFIC: WOULD NEVER DOZE
HOW LIKELY ARE YOU TO NOD OFF OR FALL ASLEEP WHILE SITTING INACTIVE IN A PUBLIC PLACE: WOULD NEVER DOZE
HOW LIKELY ARE YOU TO NOD OFF OR FALL ASLEEP WHILE SITTING AND TALKING TO SOMEONE: WOULD NEVER DOZE
HOW LIKELY ARE YOU TO NOD OFF OR FALL ASLEEP IN A CAR, WHILE STOPPED FOR A FEW MINUTES IN TRAFFIC: WOULD NEVER DOZE
HOW LIKELY ARE YOU TO NOD OFF OR FALL ASLEEP WHILE SITTING AND TALKING TO SOMEONE: WOULD NEVER DOZE

## 2023-04-28 ENCOUNTER — OFFICE VISIT (OUTPATIENT)
Dept: SURGERY | Facility: CLINIC | Age: 78
End: 2023-04-28
Payer: MEDICARE

## 2023-04-28 VITALS
RESPIRATION RATE: 20 BRPM | OXYGEN SATURATION: 99 % | WEIGHT: 211 LBS | BODY MASS INDEX: 41.43 KG/M2 | HEIGHT: 60 IN | HEART RATE: 79 BPM

## 2023-04-28 DIAGNOSIS — E66.01 MORBID OBESITY WITH BMI OF 40.0-44.9, ADULT (H): Primary | ICD-10-CM

## 2023-04-28 DIAGNOSIS — E11.69 TYPE 2 DIABETES MELLITUS WITH OTHER SPECIFIED COMPLICATION, WITHOUT LONG-TERM CURRENT USE OF INSULIN (H): ICD-10-CM

## 2023-04-28 PROCEDURE — 99214 OFFICE O/P EST MOD 30 MIN: CPT | Mod: GA | Performed by: PHYSICIAN ASSISTANT

## 2023-04-28 NOTE — PATIENT INSTRUCTIONS
"Nice to talk with you today. Thank you for allowing me the privilege of caring for you. We hope we provided you with the excellent service you deserve.     To ensure the quality of our services you may receive a patient satisfaction survey from an independent monitoring company.  The greatest compliment you can give is \"Likely to Recommend\"    Below is our plan we discussed.-  KOKI Howard        Referral for pool therapy at [facility]    Have a great day!     "

## 2023-04-28 NOTE — PROGRESS NOTES
"  New Medical Weight Management Consult        PATIENT:  Chasity Hodgson  MRN:         [id number]  :         1945  JESSICA:         2023        Dear Shola Benoit MD,    I had the pleasure of seeing your patient, Chasity Hodgson. Full intake/assessment was done to determine barriers to weight loss success and develop a treatment plan. Chasity Hodgson is a 77 year old female interested in treatment of medical problems associated with excess weight. She has a height of 5' 0\", a weight of 211 lbs 0 oz, and the calculated Body mass index is 41.21 kg/m .       Patient has a concern with swelling in legs.  Januvia seems to make worse. Was on ozempic but was having GI problems Has Libre3    DMT2 last A1C = 7.1  Met with diabetic educator last year      ASSESSMENT/PLAN:  Class 3 severe obesity due to excess calories with Body Mass Index (BMI) of 41  GERD without esophagitis  CKD         We discussed the role of pharmacological agents in the treatment of obesity and the \"off-label\" use of medications in this practice. We discussed the risks and benefits of each. We discussed indications, contraindications, potential side effects, and estimated costs of each. Discussed that medications must always be used together with lifestyle changes such as improvements in diet choices, portion control and establishing and maintaining a regular exercise program.     On insulin and Januvia. Was on Ozempic but stopped due to N/V gastroparesis. Sees endocrinology  Was on metformin in the past    Recommended talk to endocrinologist regarding tirzepatide.       Goals:  3 meals daily  Will send over Sister Max jamison therapy referal   Will be getting a lymphedema pump      Follow up: prn      She has the following co-morbidities:        2023    12:02 PM   --   I have the following health issues associated with obesity Type II Diabetes    High Blood Pressure    GERD (Reflux)    Fatty Liver    Stress Incontinence    Lymphedema   I have " "the following symptoms associated with obesity Lower Extremity Swelling    Fatigue    Groin Rash               4/25/2023    12:02 PM   Referring Provider   Please name the provider who referred you to Medical Weight Management  If you do not know, please answer \"I Don't Know\" Dr Shola Benoit           4/25/2023    12:02 PM   Weight History   How concerned are you about your weight? Very Concerned   I became overweight As a Child   The following factors have contributed to my weight gain Started on Medication that Caused Weight Gain    Eating Wrong Types of Food    Lack of Exercise    Genetic (Runs in the Family)    Stress   I have tried the following methods to lose weight Watching Portions or Calories    Exercise    Weight Watchers   My lowest weight since age 19 was 140   My highest weight since age 19 was 220   The most weight I have ever lost was (lbs) 45   I have the following family history of obesity/being overweight One or more of my siblings are overweight    Many of my relatives are overweight   How has your weight changed over the last year? Gained   How many pounds? 10       Up at 8:15 am  Ate 11 am   B: eggs with english muffin with buttery spread  D: Ate out at [facility] and had a 6 oz sirloin steak with baked potato and a salad    Fluids: 40 oz (has urinary incontinence so does not drink more)           4/25/2023    12:02 PM   Diet Recall Review with Patient   If you do eat breakfast, what types of food do you eat? Hard boiled eggs with toast, cereal or egg and cheese on toast, some type of fruit. Sometimes sausage links with eggs.   If you do eat supper, what types of food do you typically eat? Hot dish, chicken, beef or pork with vegetables, sometimes with a dinner roll. Hamberger on a bun, sometimes a baked potatoe with a steak.   If you do snack, what types of food do you typically eat? Pudding, carrot sticks, mixed nuts, ritz crackers with cheese, Breakfast Belvita crackers   How many " glasses of juice do you drink in a typical day? 1   How many of glasses of milk do you drink in a typical day? 1   If you do drink milk, what type? 1%   How many 8oz glasses of sugar containing drinks such as Manny-Aid/sweet tea do you drink in a day? 0   How many cans/bottles of sugar pop/soda/tea/sports drinks do you drink in a day? 0   How many cans/bottles of diet pop/soda/tea or sports drink do you drink in a day? 1   How often do you have a drink of alcohol? Never           4/25/2023    12:02 PM   Eating Habits   Generally, my meals include foods like these bread, pasta, rice, potatoes, corn, crackers, sweet dessert, pop, or juice A Few Times a Week   Generally, my meals include foods like these fried meats, brats, burgers, french fries, pizza, cheese, chips, or ice cream Less Than Weekly   Eat fast food (like McDonal[employer], [employer], [employer]) Less Than Weekly   Eat at a buffet or sit-down restaurant Less Than Weekly   Eat most of my meals in front of the TV or computer A Few Times a Week   Often skip meals, eat at random times, have no regular eating times A Few Times a Week   Rarely sit down for a meal but snack or graze throughout Less Than Weekly   Eat extra snacks between meals A Few Times a Week   Eat most of my food at the end of the day Less Than Weekly   Eat in the middle of the night or wake up at night to eat Never   Eat extra snacks to prevent or correct low blood sugar Less Than Weekly   Eat to prevent acid reflux or stomach pain Never   Worry about not having enough food to eat Never   I eat when I am depressed Never   I eat when I am stressed Less Than Weekly   I eat when I am bored Never   I eat when I am anxious Never   I eat when I am happy or as a reward Never   I feel hungry all the time even if I just have eaten Never   Feeling full is important to me Never   I finish all the food on my plate even if I am already full Never   I can't resist eating delicious food or walk past the good  food/smell Less Than Weekly   I eat/snack without noticing that I am eating Never   I eat when I am preparing the meal Less Than Weekly   I eat more than usual when I see others eating Never   I have trouble not eating sweets, ice cream, cookies, or chips if they are around the house Less Than Weekly   I think about food all day Never   What foods, if any, do you crave? Sweets/Candy/Chocolate   Please list any other foods you crave? Pop corn, chicken, various meats, sparkling ice water, sprite zero,  toast with jelly or peanut butter,  bread for sandwiches, eggs, 1% milk,  V8 juice,  apples, grapefruit, raspberries, mixed vegetables,  cantaloupe, grapes, strawberries,, salads.           4/25/2023    12:02 PM   Amount of Food   I make myself vomit what I have eaten or use laxatives to get rid of food Never   I eat a large amount of food, like a loaf of bread, a box of cookies, a pint/quart of ice cream, all at once Never   I eat a large amount of food even when I am not hungry Never   I eat rapidly Weekly   I eat alone because I feel embarrassed and do not want others to see how much I have eaten Never   I eat until I am uncomfortably full Never   I feel bad, disgusted, or guilty after I overeat Never           4/25/2023    12:02 PM   Activity/Exercise History   How much of a typical 12 hour day do you spend sitting? Most of the Day   How much of a typical 12 hour day do you spend lying down? Less Than Half the Day   How much of a typical day do you spend walking/standing? Less Than Half the Day   How many hours (not including work) do you spend on the TV/Video Games/Computer/Tablet/Phone? 4-5 Hours   How many times a week are you active for the purpose of exercise? Once a Week   What keeps you from being more active? Unsure What To Do    Other   How many total minutes do you spend doing some activity for the purpose of exercising when you exercise? 15-30 Minutes      Had polio age 3 that paralyzed right leg    PAST  MEDICAL HISTORY:  Past Medical History:   Diagnosis Date     Abdominal adhesions 1984, 96,99    s/p lysis     Abdominal adhesions      Acne rosacea      Allergic rhinitis      Allergic rhinitis, cause unspecified      Alopecia      Anemia      CAD (coronary artery disease)      Carotid stenosis      CKD (chronic kidney disease) stage 2, GFR 60-89 ml/min      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)      CPAP (continuous positive airway pressure) dependence      Depression      Diabetic gastroparesis (H)      Diet-controlled type 2 diabetes mellitus (H)      DM2 (diabetes mellitus, type 2) (H)      DVT (deep venous thrombosis) (H)      DVT of axillary vein, acute right (H)      Fibromyalgia      Gastro-oesophageal reflux disease      GERD (gastroesophageal reflux disease)      Hernia of unspecified site of abdominal cavity without mention of obstruction or gangrene     bilateral     Hernia, abdominal      History of blood transfusion     10/ 1980     History of thrombophlebitis      HTN (hypertension)      HTN (hypertension)      Hypertriglyceridemia      Hypertriglyceridemia      Hypokalemia      Hypothyroidism      Mumps      Obstructive sleep apnea      ANNETTE (obstructive sleep apnea)      ANNETTE on CPAP      Osteoarthritis of glenohumeral joint      Papillary carcinoma of thyroid (H)     s/p thyroidectomy - Ruegemer     Papillary carcinoma of thyroid (H)      PE (pulmonary embolism)      Poliomyelitis     poor circulation right leg     Poliomyelitis      Postsurgical hypothyroidism     s/p papillary thryoid cancer - Ruegemer     Pulmonary embolism (H)      Pulmonary embolus (H)      Pulmonary nodule      Rosacea      S/P carpal tunnel release     bilateral     S/P hardware removal 01/2014    still with lingering foot pain     S/P shoulder surgery     bilateral     Septic joint (H)     right knee     Septic joint of right knee joint (H)      Venous insufficiency      Venous insufficiency      Venous thrombosis 1999    right axillary  vein           4/25/2023    12:02 PM   Work/Social History Reviewed With Patient   My employment status is Retired   My job is Retired   What is your marital status? /In a Relationship   If in a relationship, is your significant other overweight? N/A   If you have children, are they overweight? No   Who do you live with? I live alone, my  is in a nursing home   Who does the food shopping? Me           4/25/2023    12:02 PM   Mental Health History Reviewed With Patient   How often in the past 2 weeks have you felt little interest or pleasure in doing things? Not at all           4/25/2023    12:02 PM   Sleep History Reviewed With Patient   How many hours do you sleep at night? 6      ROS  General  Fatigue: No        MEDICATIONS:   Current Outpatient Medications   Medication Sig Dispense Refill     aspirin (ASA) 81 MG EC tablet Take 81 mg by mouth daily       azelastine (ASTEPRO) 0.15 % nasal spray 1 spray       carvedilol (COREG) 12.5 MG tablet Take 1 tablet (12.5 mg) by mouth 2 times daily (with meals) 180 tablet 3     Cholecalciferol (VITAMIN D-3 PO) Take 2 tablets by mouth       clotrimazole (LOTRIMIN) 1 % cream Apply topically daily       Continuous Blood Gluc Sensor (FREESTYLE BRANDY 3 SENSOR) MISC 1 each every 14 days 6 each 3     diphenhydrAMINE-acetaminophen (TYLENOL PM)  MG tablet Take 1 tablet by mouth At Bedtime Reported on 3/20/2017       ezetimibe (ZETIA) 10 MG tablet Take 1 tablet (10 mg) by mouth daily 90 tablet 3     famotidine (PEPCID) 20 MG tablet Take 2 tablets (40 mg) by mouth as needed 180 tablet 3     fenofibrate (TRICOR) 145 MG tablet Take 1 tablet (145 mg) by mouth daily 90 tablet 3     ferrous sulfate (IRON) 325 (65 FE) MG tablet Take 325 mg by mouth daily (with breakfast)       fexofenadine (ALLEGRA) 180 MG tablet Take 180 mg by mouth daily. 120 0     fluocinolone acetonide (DERMA SMOOTHE/FS BODY) 0.01 % external oil Apply 2 mL to scalp once per week. Massage into scalp.  Can be left in overnight or washed out after 4-6 hours. 118.28 mL 5     fluticasone (FLONASE) 50 MCG/ACT spray Spray 2 sprays in nostril daily 2 sprays in each nostril qd 1 Bottle 0     furosemide (LASIX) 20 MG tablet Take 1 tablet (20 mg) by mouth daily as needed (lower extremity edema) 90 tablet 3     Icosapent Ethyl 1 g CAPS Take 1 g by mouth 2 times daily 60 capsule 11     insulin degludec (TRESIBA FLEXTOUCH) 100 UNIT/ML pen Inject 25 Units Subcutaneous every morning. 5 mL 1     insulin pen needle (B-D U/F) 31G X 5 MM miscellaneous Use 1 pen needles daily or as directed. 90 each 3     lisinopril (ZESTRIL) 40 MG tablet Take 1 tablet (40 mg) by mouth daily 90 tablet 3     MULTIVITAMINS OR TABS ONE DAILY 100 3     nitroGLYcerin (NITROSTAT) 0.4 MG sublingual tablet Place 1 tablet (0.4 mg) under the tongue every 5 minutes as needed for chest pain (no more than 3 in one hour; after 3rd, call 911.) 25 tablet 3     nystatin (MYCOSTATIN) cream Apply topically daily as needed        nystatin-triamcinolone (MYCOLOG II) cream Apply topically daily as needed        ondansetron (ZOFRAN) 4 MG tablet Take 1 tablet by mouth every 6 hours as needed Reported on 3/20/2017       order for DME Equipment being ordered: Compression stockings - Knee High; 20-30 mmHg compression - note would like adhesive band to keep the stocking from sliding down 3 each 0     order for DME Equipment being ordered: Oral appliance for sleep apnea 1 Units 0     pantoprazole (PROTONIX) 40 MG EC tablet Take 40 mg by mouth 2 times daily       sitagliptin (JANUVIA) 50 MG tablet Take 1 tablet (50 mg) by mouth daily 90 tablet 3     SYNTHROID 112 MCG tablet TAKE ONE-HALF TABLET BY MOUTH DAILY ON FRIDAYS AND TAKE ONE TABLET BY MOUTH ALL OTHER DAYS AS DIRECTED 90 tablet 3     tolterodine ER (DETROL LA) 4 MG 24 hr capsule Take 1 capsule (4 mg) by mouth daily 30 capsule 11     tretinoin (RETIN-A) 0.025 % external cream Use every night as tolerated - spot treat  lesion 20 g 3       ALLERGIES:   Allergies   Allergen Reactions     Ketorolac Tromethamine Difficulty breathing     Shortness of breath     Nsaids Difficulty breathing     Increased creatinine     Celecoxib Itching and Rash     Morphine And Related Itching     With higher doses     Crestor [Rosuvastatin] Muscle Pain (Myalgia)     No Clinical Screening - See Comments Itching     Fragrance     Vioxx Other (See Comments)     Heart races     Conjugated Estrogens Rash     Sulfa Antibiotics Rash       PHYSICAL EXAM:  Pulse 79   Resp 20   Ht 5' (1.524 m)   Wt 211 lb (95.7 kg)   SpO2 99%   BMI 41.21 kg/m    GENERAL: Healthy, alert and no distress  EYES: Eyes grossly normal to inspection.  No discharge or erythema, or obvious scleral/conjunctival abnormalities.  RESP: No audible wheeze, cough, or visible cyanosis.  No visible retractions or increased work of breathing.    SKIN: Visible skin clear. No significant rash, abnormal pigmentation or lesions.  NEURO: Cranial nerves grossly intact.  Mentation and speech appropriate for age.  PSYCH: Mentation appears normal, affect normal/bright, judgement and insight intact, normal speech and appearance well-groomed.        Sincerely,    Lee Arenas PA-C        45 minutes spent on the date of the encounter doing chart review, review of test results, patient visit and documentation

## 2023-04-28 NOTE — PROGRESS NOTES
[facility]  CLINIC PROGRESS NOTE    Subjective:   Lymphedema   Chasity Hodgson has had resolution of the drainage in her legs.  Has plans to follow up in lymphedema clinic.  She completed antibiotics (Keflex) for possible cellulitis.  She has been using topical anti-itch creams such as hydrocortisone.   No open sores  Mild major depression (H)   Mood has been good.  No issues without any use of anti-depressants.  Helping to care for her  who is living in a care center.    Morbid obesity (H)   Has been trying to lose weight.  Having difficulty with diet.  Papillary carcinoma of thyroid (H)   Has an appointment to follow up with Dr. Li in endocrinology   Stage 3a chronic kidney disease   Labs have shown stable chronic kidney disease.  No issues.     Past medical history, medications, allergies, social history, family history reviewed and updated in EPIC as of 7/7/2021 .    ROS  CONSTITUTIONAL: no fatigue, no unexpected change in weight  SKIN: as above   EYES: no acute vision problems or changes  ENT: follow up in endocrinology   RESP: no significant cough, no shortness of breath  CV: no chest pain, no palpitations, no new or worsening peripheral edema  GI: no nausea, no vomiting, no constipation, no diarrhea  : no frequency, no dysuria, no hematuria  MS: not discussed  PSYCHIATRIC: no changes in mood or affect      Objective:  Vitals  BP (!) 180/87 (BP Location: Left arm, Patient Position: Sitting, Cuff Size: Adult Regular)   Pulse 69   Temp 97.2  F (36.2  C) (Temporal)   Ht 1.524 m (5')   Wt 89.2 kg (196 lb 11.2 oz)   SpO2 99%   BMI 38.42 kg/m    GEN: Alert Oriented x3 NAD  HEENT: Atraumatic, normocephalic,   CV: RRR no murmurs or rubs  PULM: CTA no wheezes or crackles  ABD: Soft, nontender nondistended   SKIN: No visible skin lesion or ulcerations  EXT: 2+ edema bilateral lower extremities - notable improved erythema  NEURO: Gait and station deferred, No focal neurologic  deficits  PSYCH: Mood good, affect mood congruent    No images are attached to the encounter.    No results found. However, due to the size of the patient record, not all encounters were searched. Please check Results Review for a complete set of results.    Assessment/Plan:  Patient Instructions   (E11.9) Type 2 diabetes, HbA1c goal < 7% (H)  (primary encounter diagnosis)  Comment: Due for follow up in endocrinology - check labs today  Plan: Basic metabolic panel  (Ca, Cl, CO2, Creat,         Gluc, K, Na, BUN), Hemoglobin A1c, Albumin         Random Urine Quantitative with Creat Ratio            (F32.0) Mild major depression (H)  Comment: Discussed options for selective serotonin reuptake inhibitor use and declined  Plan:     (E66.01) Morbid obesity (H)  Comment: Continue to work on self-directed dieting  Plan:     (C73) Papillary carcinoma of thyroid (H)  Comment: Recommend follow up in endocrinology   Plan:     (N18.31) Stage 3a chronic kidney disease  Comment: Check labs today  Plan:     Lymphedema  Comment; Follow-up with lymphedema therapy       Follow up in 3 months    Disclaimer: This note consists of symbols derived from keyboarding, dictation and/or voice recognition software. As a result, there may be errors in the script that have gone undetected. Please consider this when interpreting information found in this chart.    Shola Benoit MD  [phone]        2

## 2023-04-30 NOTE — PROGRESS NOTES
Virtual Visit Details    Type of service:  Video Visit   Video Start Time: 10:57 AM  Video End Time:11:35 AM    Originating Location (pt. Location): Home    Distant Location (provider location):  On-site  Platform used for Video Visit: Well     Sleep Follow-Up Visit:    Date on this visit: 5/1/2023    Chasity Hodgson comes in today for follow-up of her sleep study done on 3/15/23. Chasity Hodgson was initially seen for previously diagnosed mild ANNETTE.     She had a study in 2006 that showed an AHI of 5/hr, RDI of 6/hr. She tried CPAP but did not tolerate it well. She is asked frequently by other providers if she still have apnea. She wants to see if her apnea is any worse than it was.     PSG Results:  Weight: 211 pounds  Sleep Architecture: The total recording time of the polysomnogram was 413.6 minutes. The total sleep time was 378.0 minutes. Sleep latency was decreased at 3.1 minutes without the use of a sleep aid. REM latency was 84.0 minutes. Arousal index was normal at 7.6 arousals per hour. Sleep efficiency was normal at 91.4%. Wake after sleep onset was 32.0 minutes. The patient spent 3.0% of total sleep time in Stage N1, 56.7% in Stage N2, 20.9% in Stage N3, and 19.3% in REM. Time in REM supine was 73.0 minutes.     Respiration: Mild obstructive sleep apnea.     Events ? The polysomnogram revealed a presence of 1 obstructive, - central, and 1 mixed apneas resulting in an apnea index of 0.3 events per hour. There were 49 obstructive hypopneas and - central hypopneas resulting in an obstructive hypopnea index of 7.8 and central hypopnea index of - events per hour. The combined apnea/hypopnea index was 8.1 events per hour (central apnea/hypopnea index was - events per hour). The REM AHI was 16.4 events per hour. The supine AHI was 8.1 events per hour. The RERA index was 0.8 events per hour.  The RDI was 8.9 events per hour.    Snoring - was reported as mild.    Respiratory rate and pattern - was notable for normal  respiratory rate and pattern.    Sustained Sleep Associated Hypoventilation - Transcutaneous carbon dioxide monitoring was not used, however significant hypoventilation was not suggested by oximetry.    Sleep Associated Hypoxemia - (Greater than 5 minutes O2 sat at or below 88%) was not present. Baseline oxygen saturation was 95.0%. Lowest oxygen saturation was 85.0%. Time spent less than or equal to 88% was 0.8 minutes. Time spent less than or equal to 89% was 1.1 minutes.     Movement Activity: Negative for significant movement abnormalities.     Periodic Limb Activity - There were - PLMs during the entire study. The PLM index was - movements per hour. The PLM Arousal Index was - per hour.    REM EMG Activity - Excessive transient/sustained muscle activity was not present.    Nocturnal Behavior - Abnormal sleep related behaviors were not noted during/arising out of NREM / REM sleep.     Bruxism - None apparent.     Cardiac Summary: Sinus rhythm.   The average pulse rate was 67.6 bpm. The minimum pulse rate was 59.0 bpm while the maximum pulse rate was 80.0 bpm.  Arrhythmias were not noted.     Assessment:     This sleep study shows a mild degree of obstructive sleep apnea and associated oxygen desaturations. Of note, patient slept entire night in the supine position.     She sleeps supine with her head of bed elevated, usually a little more than the she did on the night of the study.    She took Tylenol PM on the night of the sleep study. She has not taken that since karen study.     Past medical/surgical history, family history, social history, medications and allergies were reviewed.      Problem List:  Patient Active Problem List    Diagnosis Date Noted     Urinary incontinence without sensory awareness 02/08/2023     Priority: Medium     Lymphedema 02/03/2023     Priority: Medium     DVT of upper extremity (deep vein thrombosis) (H) 12/30/2022     Priority: Medium     Formatting of this note might be different  from the original.  right       Venous insufficiency      Priority: Medium     Edema of lower extremity 09/19/2022     Priority: Medium     Mild major depression (H) 07/12/2022     Priority: Medium     Neuropathic pain 06/02/2022     Priority: Medium     Primary insomnia 11/24/2020     Priority: Medium     Moderate protein-calorie malnutrition (H) 11/21/2020     Priority: Medium     Status post total shoulder arthroplasty, left 11/21/2020     Priority: Medium     Diabetic nephropathy associated with type 2 diabetes mellitus (H) 09/02/2020     Priority: Medium     Vitamin D deficiency 09/02/2020     Priority: Medium     Renal artery stenosis (H) 07/31/2020     Priority: Medium     Avascular necrosis of bone of hip, left (H) 05/14/2020     Priority: Medium     Osteoarthritis of left hip 05/14/2020     Priority: Medium     CKD (chronic kidney disease) stage 3, GFR 30-59 ml/min (H) 04/17/2019     Priority: Medium     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     Priority: Medium     Morbid obesity with BMI of 40.0-44.9, adult (H) 08/01/2018     Priority: Medium     History of colonic polyps 05/31/2018     Priority: Medium     Iron deficiency anemia 05/31/2018     Priority: Medium     Surgical aftercare, musculoskeletal system 04/11/2018     Priority: Medium     History of pulmonary embolus (PE) 04/04/2018     Priority: Medium     Inguinal pain 03/20/2018     Priority: Medium     Right lower quadrant pain 03/20/2018     Priority: Medium     Normocytic anemia 01/16/2017     Priority: Medium     Allergic dermatitis due to other chemical product 10/02/2016     Priority: Medium     Insufficiency, arterial, peripheral (H) 11/08/2015     Priority: Medium     Mild seen on CARMITA 11/2015 - right sided       Type 2 diabetes mellitus with other specified complication (H) 10/18/2015     Priority: Medium     Carotid stenosis 12/09/2014     Priority: Medium     Mild increased stenosis 50-69% left ICA       Right shoulder pain 12/09/2014      Priority: Medium     Left knee pain 11/03/2014     Priority: Medium     Poliomyelitis      Priority: Medium     poor circulation right leg       Diabetic gastroparesis (H)      Priority: Medium     Dermatitis 02/15/2014     Priority: Medium     Acne rosacea 02/15/2014     Priority: Medium     Esophageal reflux 01/23/2014     Priority: Medium     Had upper endoscopy - Dr. Pantoja 2013       Dysphagia 09/16/2013     Priority: Medium     Pulmonary nodule 09/06/2013     Priority: Medium     Alopecia 02/09/2013     Priority: Medium     Problem list name updated by automated process. Provider to review       Papillary carcinoma of thyroid (H)      Priority: Medium     s/p thyroidectomy - Ruegemer       Gastroparesis 11/12/2012     Priority: Medium     Postsurgical hypothyroidism      Priority: Medium     s/p papillary thryoid cancer - Marj  Concern for possible recurrence 03/2014       Type 2 diabetes, HbA1c goal < 7% (H)      Priority: Medium     ACP (advance care planning) 09/21/2012     Priority: Medium     Discussed advance care planning with patient; information given to patient to review. 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 Provider to review       ANNETTE (obstructive sleep apnea)      Priority: Medium     Contact dermatitis 04/18/2011     Priority: Medium     Mixed hyperlipidemia 10/31/2010     Priority: Medium     Low back pain 07/15/2009     Priority: Medium        Impression/Plan:    (G47.33) ANNETTE (obstructive sleep apnea)   (primary encounter diagnosis), (I10) Benign essential hypertension  Comment: This study showed mild, REM predominant seep apnea, AHI 8/hr, REM AHI 16/hr.  She just initiated care with the weight loss clinic. She has had quite high blood pressure readings at times, so I am interested to see if treating her mild ANNETTE improves that at all. If not, positional restriction (either sleeping laterally or elevating the head of bed a little more) may help reduce the apnea enough. She did not have significant sleep disruption or any significant hypoxemia, so this degree of apnea may not need to be treated.  Plan: Comprehensive DME        We reviewed treatment options including CPAP, dental appliance, weight loss, positional restriction and ENT surgery. The patient was interested in CPAP. I am prescribing auto CPAP 5-15 cm, heated humidifier and compliance meter. The patient was informed of the mask exchange policy and the compliance goals. They were also informed that they would be followed by the sleep therapy management team during the first month of CPAP use.      (F51.01) Primary insomnia  Comment: did not address today  Plan: will reassess at her follow up       She will follow up with me in about 2 month(s).     38 minutes were spent on the date of the encounter doing chart review, history and exam, documentation and further activities as noted above.      Bennett Goltz, PA-C    CC: Shola Benoit MD

## 2023-05-01 ENCOUNTER — VIRTUAL VISIT (OUTPATIENT)
Dept: SLEEP MEDICINE | Facility: CLINIC | Age: 78
End: 2023-05-01
Payer: MEDICARE

## 2023-05-01 VITALS
DIASTOLIC BLOOD PRESSURE: 83 MMHG | BODY MASS INDEX: 41.43 KG/M2 | HEART RATE: 68 BPM | HEIGHT: 60 IN | WEIGHT: 211 LBS | SYSTOLIC BLOOD PRESSURE: 170 MMHG

## 2023-05-01 DIAGNOSIS — I10 BENIGN ESSENTIAL HYPERTENSION: ICD-10-CM

## 2023-05-01 DIAGNOSIS — G47.33 OSA (OBSTRUCTIVE SLEEP APNEA): Primary | ICD-10-CM

## 2023-05-01 DIAGNOSIS — F51.01 PRIMARY INSOMNIA: ICD-10-CM

## 2023-05-01 PROCEDURE — 99214 OFFICE O/P EST MOD 30 MIN: CPT | Mod: VID | Performed by: PHYSICIAN ASSISTANT

## 2023-05-01 RX ORDER — ACETAMINOPHEN 500 MG
1000 TABLET ORAL EVERY 8 HOURS PRN
COMMUNITY
Start: 2020-08-17

## 2023-05-01 ASSESSMENT — PAIN SCALES - GENERAL: PAINLEVEL: MODERATE PAIN (5)

## 2023-05-01 NOTE — PROGRESS NOTES
Referral to [facility] tx reordered per OBDULIO Howard PA-C.  This was ordered and faxed to [facility].  Pt notified via  msg.  Verified fax was received at 1103 today.  Yessenia Motta MS, RD, RN

## 2023-05-01 NOTE — NURSING NOTE
Is the patient currently in the state of [place]? YES    Visit mode:VIDEO    If the visit is dropped, the patient can be reconnected by: VIDEO VISIT: Send to e-mail at: [email or web address]    Will anyone else be joining the visit? NO      How would you like to obtain your AVS? MyChart    Are changes needed to the allergy or medication list? NO    Reason for visit: sleep study follow up

## 2023-05-01 NOTE — PATIENT INSTRUCTIONS
You will be provided with an auto-titrating CPAP with a pressure range of 5-15 cm with heated humidity to limit nasal congestion. Adjust the heat level on humidifier to find a setting that prevents dry nose but does not cause condensation in the hose or mask. Use distilled water in the humidifier.  The CPAP has a ramp function that starts the pressure lower than your prescribed pressure and gradually increases it over a number of minutes.  This may make it easier to fall asleep.    Try to use the CPAP every-night, all night (minimum of 4 hours). Many insurances require that we prove you are using the CPAP at least 4 hours on at least 70% of nights over a 30 day period. We have 90 days to meet those criteria.            Discussed weight management and the impact of weight gain on sleep apnea.  Let me know if you snore or feel the pressure is too high.    You can get new supplies (mask, hose and filter) for your CPAP every 3-6 months, covered by insurance. You do not need to get supplies that often, but they are available if you would like them.  You may exchange the mask once within the first month if you feel the initial mask does not fit well.  Contact your medical equipment provider for equipment issues.  Please let me know if you have any return of snoring, daytime sleepiness or poor sleep quality. We will want to make sure your CPAP is adequately treating your apnea.  There is a website called CPAP.com that has accessories that may make CPAP use easier. Please visit it at your convenience.  Our phone number is [phone].    Follow-up 2 month.  Bring your CPAP machine with you to the follow up appointment.

## 2023-05-02 ENCOUNTER — THERAPY VISIT (OUTPATIENT)
Dept: PHYSICAL THERAPY | Facility: CLINIC | Age: 78
End: 2023-05-02
Payer: MEDICARE

## 2023-05-02 DIAGNOSIS — N39.42 URINARY INCONTINENCE WITHOUT SENSORY AWARENESS: Primary | ICD-10-CM

## 2023-05-02 PROCEDURE — 97530 THERAPEUTIC ACTIVITIES: CPT | Mod: GP | Performed by: PHYSICAL THERAPIST

## 2023-05-02 NOTE — PROGRESS NOTES
Subjective:  HPI  Physical Exam                    Objective:  System    Physical Exam    General     ROS    Assessment/Plan:    DISCHARGE REPORT    Progress reporting period is from 2/7/2023 to 5/2/2023.       SUBJECTIVE  Subjective: Sammie reports continuing to have a lot of issues with leaking. She noticed that one of her triggers for urgency is running water.    Current pain level is 0/10  .     Previous pain level was  0/10  .   Changes in function:  Yes (See Goal flowsheet attached for changes in current functional level)  Adverse reaction to treatment or activity: None    OBJECTIVE  Objective: Sammie reports leakage 3-4 x per week; Grade 1 PF MMT     ASSESSMENT/PLAN  STG/LTGs have been met or progress has been made towards goals:  Yes, Goals partially met  Assessment of Progress: The patient's progress has slowed.  The patient has had set backs in their progress.  Self Management Plans:  Patient has been instructed in a home treatment program.  Patient is independent in a home treatment program.  Patient  has been instructed in self management of symptoms.  Patient is independent in self management of symptoms.  I have re-evaluated this patient and find that the nature, scope, duration and intensity of the therapy is appropriate for the medical condition of the patient.  Chasity continues to require the following intervention to meet STG and LTG's:  PT    Recommendations:  This patient is ready to be discharged from therapy and continue their home treatment program.    Please refer to the daily flowsheet for treatment today, total treatment time and time spent performing 1:1 timed codes.

## 2023-05-03 DIAGNOSIS — E11.9 TYPE 2 DIABETES, HBA1C GOAL < 7% (H): ICD-10-CM

## 2023-05-03 RX ORDER — INSULIN DEGLUDEC 100 U/ML
INJECTION, SOLUTION SUBCUTANEOUS
Qty: 15 ML | Refills: 3 | Status: SHIPPED | OUTPATIENT
Start: 2023-05-03 | End: 2023-06-23

## 2023-05-04 ENCOUNTER — DOCUMENTATION ONLY (OUTPATIENT)
Dept: ENDOCRINOLOGY | Facility: CLINIC | Age: 78
End: 2023-05-04

## 2023-05-04 ENCOUNTER — VIRTUAL VISIT (OUTPATIENT)
Dept: SURGERY | Facility: CLINIC | Age: 78
End: 2023-05-04
Payer: MEDICARE

## 2023-05-04 DIAGNOSIS — E66.01 MORBID OBESITY WITH BMI OF 40.0-44.9, ADULT (H): Primary | ICD-10-CM

## 2023-05-04 PROCEDURE — 97802 MEDICAL NUTRITION INDIV IN: CPT | Mod: VID

## 2023-05-04 NOTE — PROGRESS NOTES
"Telemedicine Visit: The patient's condition can be safely assessed and treated via synchronous audio and visual telemedicine encounter.      Reason for Telemedicine Visit: Patient has requested telehealth visit    Originating Site (Patient Location): Patient's home        Distant Location (provider location):  On-site    Consent:  The patient/guardian has verbally consented to: the potential risks and benefits of telemedicine (video visit) versus in person care; bill my insurance or make self-payment for services provided; and responsibility for payment of non-covered services.     Mode of Communication:  Video Conference via [employer]    As the provider I attest to compliance with applicable laws and regulations related to telemedicine.    MEDICAL WEIGHT LOSS INITIAL EVALUATION  DIAGNOSIS:  Obese, class I    NUTRITION HISTORY:  Breakfast: fruit (1/2 grapefruit) + cereal (Cheerios) with 1% milk; egg with cheese on toast (9:30-10:00)   Lunch: turkey sandwich with cheese and tomato slices (1:00-1:30)   Dinner: chicken cacciatore  + rice (1/2 cup); hamburger bhavik with salad (5:00-6:00)   Snacks: crackers (ritz) with cheese; apple slices; raspberries   Beverage choices: water; 6 oz milk   Dining out: limited     Exercise: Patient exercises 1 time per week.   Additional Information: Patient has gastroparesis and was told to eat small meals (5-6 per day).  Patient has met with diabetes educator but did not receive specific carbohydrates to eat per meal.  Patient lives alone as her  has been in a nursing home for the past 3 years (January 2020).  Patient would like to plan meals and eat on a schedule as does not currently do so.  Patient has difficulties with her legs swelling so tries to limit her sodium intake.      Patient approved ABN.     ANTHROPOMETRICS:  Height: 5'0\"  Weight: 211 lb   BMI: 41.2 kg/m2  NUTRITION DIAGNOSIS:   Overweight/Obese class I related to overeating and poor lifestyle habits as " evidence by patient's subjective statements and  BMI of 41.2 kg/m2   NUTRITION INTERVENTIONS  Nutrition Prescription:  Recommend modified energy- nutrient intake  Implementation:  Nutrition Education (Content):    Discussed portion sizes and carbohydrates.  Also discussed meal planning and timing of meals. Encouraged patient to eat vegetables as able since patient states has difficulties with salads due to gastroparesis    Provided: Tips for Weight Loss and Weight Management, My Plate     Nutrition Education (Application):     Patient to practice goals as stated below    Patient verbalizes understanding of diet by will monitor carbohydrate intake     Expected patient engagement: good     Goals:  Aim for 30-45 grams carbohydrate per meal    Set times for meals     FOLLOW UP AND MONITORING:   Other  - follow up in 4-6 weeks.     TIME SPENT WITH PATIENT:  22 minutes   Lyla Bergman, RD, LD  [facility] Outpatient Dietitian/Weight Loss Clinic   [phone] (office phone)

## 2023-05-04 NOTE — RESULT ENCOUNTER NOTE
Gerard Cochran,    I have had the opportunity to review your recent results and an interpretation is as follows:  I am pleased to report that your Holter monitor did not show any concerning findings.     Sincerely,  Shola Benoit MD

## 2023-05-04 NOTE — PATIENT INSTRUCTIONS
Gerard Cochran,    It was nice meeting you today.  Here are the goals we discussed:     Goals:  Aim for 30-45 grams carbohydrate per meal    If you plan to eat 5-6 times per day due to your gastroparesis, limit carbohydrate to 30 grams at meals and 15 grams for snacks  Set times for meals (eat meals 4-5 hours apart for blood glucose control)     Below are portion sizes for carbohydrates:    My Plate   [email or web address]    Please call [phone] to schedule your next RD appointment in 4-6 weeks.    Thanks,    Lyla Alvarado, RD, LD  [facility] Outpatient Dietitian/Weight Loss Clinic   [phone] (office phone)

## 2023-05-04 NOTE — PROGRESS NOTES
[facility] Insulin Affordability Program    Form(s) have been completed faxed.  Faxed or mailed to: number listed on form. [phone]  Form(s) in accordion file for 1 month then to scan.    Jorge Yao MA

## 2023-05-17 ENCOUNTER — TRANSFERRED RECORDS (OUTPATIENT)
Dept: HEALTH INFORMATION MANAGEMENT | Facility: CLINIC | Age: 78
End: 2023-05-17

## 2023-05-17 ENCOUNTER — OFFICE VISIT (OUTPATIENT)
Dept: UROLOGY | Facility: CLINIC | Age: 78
End: 2023-05-17
Payer: MEDICARE

## 2023-05-17 ENCOUNTER — TELEPHONE (OUTPATIENT)
Dept: UROLOGY | Facility: CLINIC | Age: 78
End: 2023-05-17

## 2023-05-17 VITALS — OXYGEN SATURATION: 94 % | SYSTOLIC BLOOD PRESSURE: 186 MMHG | DIASTOLIC BLOOD PRESSURE: 78 MMHG | HEART RATE: 96 BPM

## 2023-05-17 DIAGNOSIS — N39.46 MIXED INCONTINENCE: Primary | ICD-10-CM

## 2023-05-17 DIAGNOSIS — N39.46 MIXED INCONTINENCE: ICD-10-CM

## 2023-05-17 DIAGNOSIS — N32.81 OAB (OVERACTIVE BLADDER): ICD-10-CM

## 2023-05-17 LAB
ALBUMIN UR-MCNC: NEGATIVE MG/DL
APPEARANCE UR: CLEAR
BILIRUB UR QL STRIP: NEGATIVE
COLOR UR AUTO: YELLOW
GLUCOSE UR STRIP-MCNC: NEGATIVE MG/DL
HGB UR QL STRIP: NEGATIVE
KETONES UR STRIP-MCNC: NEGATIVE MG/DL
LEUKOCYTE ESTERASE UR QL STRIP: ABNORMAL
NITRATE UR QL: NEGATIVE
PH UR STRIP: 5.5 [PH] (ref 5–7)
SP GR UR STRIP: 1.02 (ref 1–1.03)
UROBILINOGEN UR STRIP-ACNC: 0.2 E.U./DL

## 2023-05-17 PROCEDURE — 52000 CYSTOURETHROSCOPY: CPT | Performed by: UROLOGY

## 2023-05-17 PROCEDURE — 99213 OFFICE O/P EST LOW 20 MIN: CPT | Mod: 25 | Performed by: UROLOGY

## 2023-05-17 PROCEDURE — 81003 URINALYSIS AUTO W/O SCOPE: CPT | Mod: QW | Performed by: UROLOGY

## 2023-05-17 RX ORDER — CHOLECALCIFEROL (VITAMIN D3) 50 MCG
1 TABLET ORAL EVERY EVENING
COMMUNITY
Start: 2018-05-14

## 2023-05-17 RX ORDER — METOCLOPRAMIDE 5 MG/1
1 TABLET ORAL
COMMUNITY
Start: 2021-06-04 | End: 2023-05-19

## 2023-05-17 RX ORDER — VIBEGRON 75 MG/1
75 TABLET, FILM COATED ORAL DAILY
Qty: 30 TABLET | Refills: 3 | Status: SHIPPED | OUTPATIENT
Start: 2023-05-17 | End: 2023-06-19

## 2023-05-17 RX ORDER — MULTIVITAMIN
1 TABLET ORAL DAILY
COMMUNITY

## 2023-05-17 RX ORDER — GLIPIZIDE 5 MG/1
TABLET ORAL EVERY 12 HOURS
COMMUNITY
End: 2023-06-28

## 2023-05-17 RX ORDER — AMOXICILLIN 500 MG/1
CAPSULE ORAL
COMMUNITY
Start: 2022-02-16

## 2023-05-17 RX ORDER — ALBUTEROL SULFATE 90 UG/1
AEROSOL, METERED RESPIRATORY (INHALATION)
Status: ON HOLD | COMMUNITY
End: 2023-09-01

## 2023-05-17 RX ORDER — POLYETHYLENE GLYCOL 400 2.5 MG/ML
1 SOLUTION/ DROPS OPHTHALMIC DAILY
COMMUNITY
Start: 2023-02-06 | End: 2024-08-18

## 2023-05-17 RX ORDER — SUCRALFATE ORAL 1 G/10ML
1 SUSPENSION ORAL 4 TIMES DAILY PRN
COMMUNITY

## 2023-05-17 NOTE — LETTER
5/17/2023       RE: Chasity Hodgson  [id number]  [place] [address]     Dear Colleague,    Thank you for referring your patient, Chasity Hodgson, to the [facility] [place] at [facility]. Please see a copy of my visit note below.    May 17, 2023    Return visit    Patient returns today for follow up.  Tolterodine doesn't seem to help all the way, has been doing PT with only a little improvement.  Uses enemas to clear the rectal vault regularly.  States she has felt that when on the toilet she may have had some rectal prolapse.  She denies any changes in her health since last visit.    BP (!) 186/78   Pulse 96   SpO2 94%   She is comfortable, in no distress, non-labored breathing.  Abdomen is obese, soft, non-tender, non-distended with colostomy in place.  Normal external female genitalia.  Negative CST.  Pelvic exam is remarkable for question of feeling something in the rectal vault but rectal exam only remarkable for high tone    Cystoscopy Note: After informed consent was obtained patient was prepped and draped in the standard fashion.  The flexible cystoscope was inserted into a normal appearing urethral meatus.  The urothelium was carefully examined and there were no tumors, masses, stones, foreign bodies, or other urothelial abnormalities noted.  Bilateral ureteral orifices were noted in the normal orthotopic position and both effluxed clear urine.  The cystoscope was retroflexed and the bladder neck was unremarkable.  The urethra was carefully examined upon removing the cystoscope and was remarkable for evidence of prior urethral bulking.  Patient tolerated the procedure without complications noted.        A/P: 77 year old F with mixed incontinence, OAB not improved with tolterodine or PT, history of prior urethral bulking, history of colostomy with question of rectal prolapse    Trial of gemtesa as the tolterodine not working and causing  bad dry mouth.  Not a candidate for mirabegron because of her blood pressure    See her general surgeon about question of rectal prolapse    RTC for UDS given the persistent symptoms    20 minutes were spent today on the date of the encounter in reviewing the EMR, direct patient care, coordination of care and documentation in addition to the cystoscopy procedure    Mishel Zendejas MD MPH  (she/her/hers)   of Urology  [facility]  Patient Care Team:  Shola Benoit MD as PCP - General (Internal Medicine)  Shola Benoit MD as Assigned PCP  Renetta Meyer MD as MD (Dermatology)  ROMI Roque MD as MD (INTERNAL MEDICINE - ENDOCRINOLOGY, DIABETES & METABOLISM)  Ulises Pantoja MD as MD (Gastroenterology)  Kishore Ferrera MD as MD (Orthopedics)  Tara Cornejo, RN as Diabetes Educator (Diabetes Education)  Catrachita Prather, PharmD as Pharmacist (Pharmacist)  Odette Weston MD as MD (Endocrinology, Diabetes, and Metabolism)  Odette Weston MD as Assigned Endocrinology Provider  Goltz, Bennett Ezra, PA-C as Assigned Neuroscience Provider  Terra Bridges PA-C as Physician Assistant (Urology)  Amber Chan APRN CNP as Assigned Heart and Vascular Provider  Terra Bridges PA-C as Assigned Surgical Provider

## 2023-05-17 NOTE — TELEPHONE ENCOUNTER
Prior Authorization Retail Medication Request    Pharmacy Information (if different than what is on RX)  Name:  [facility]  Phone:  [phone]    Thank you,  Bk Cavazos [facility] Pharmacy Technician

## 2023-05-17 NOTE — PATIENT INSTRUCTIONS
"Websites with free information:    [employer] patient website: www.voicesforpfd.org    Total Control Program: www.totalcontrolprogram.com    Please try the new medication.  You can stop the tolerodine once you start it    Return to the [facility] for bladder testing  [facility]  [address] [place] [address]  [phone]    It was a pleasure meeting with you today.  Thank you for allowing me and my team the privilege of caring for you today.  YOU are the reason we are here, and I truly hope we provided you with the excellent service you deserve.  Please let us know if there is anything else we can do for you so that we can be sure you are leaving completely satisfied with your care experience.    AFTER YOUR CYSTOSCOPY  ?  ?  You have just completed a cystoscopy, or \"cysto\", which allowed your physician to learn more about your bladder (or to remove a stent placed after surgery). We suggest that you continue to avoid caffeine, fruit juice, and alcohol for the next 24 hours, however, you are encouraged to return to your normal activities.  ?  ?  A few things that are considered normal after your cystoscopy:  ?  * small amount of bleeding (or spotting) that clears within the next 24 hours  ?  * slight burning sensation with urination  ?  * sensation of needing to void (urinate) more frequently  ?  * the feeling of \"air\" in your urine  ?  * mild discomfort that is relieved with Tylenol    * bladder spasms  ?  ?  ?  Please contact our office promptly if you:  ?  * develop a fever above 101 degrees  ?  * are unable to urinate  ?  * develop bright red blood that does not stop  ?  * experience severe pain or swelling  ?  ?  ?  And of course, please contact our office with any concerns or questions [phone].  "

## 2023-05-17 NOTE — PROGRESS NOTES
May 17, 2023    Return visit    Patient returns today for follow up.  Tolterodine doesn't seem to help all the way, has been doing PT with only a little improvement.  Uses enemas to clear the rectal vault regularly.  States she has felt that when on the toilet she may have had some rectal prolapse.  She denies any changes in her health since last visit.    BP (!) 186/78   Pulse 96   SpO2 94%   She is comfortable, in no distress, non-labored breathing.  Abdomen is obese, soft, non-tender, non-distended with colostomy in place.  Normal external female genitalia.  Negative CST.  Pelvic exam is remarkable for question of feeling something in the rectal vault but rectal exam only remarkable for high tone    Cystoscopy Note: After informed consent was obtained patient was prepped and draped in the standard fashion.  The flexible cystoscope was inserted into a normal appearing urethral meatus.  The urothelium was carefully examined and there were no tumors, masses, stones, foreign bodies, or other urothelial abnormalities noted.  Bilateral ureteral orifices were noted in the normal orthotopic position and both effluxed clear urine.  The cystoscope was retroflexed and the bladder neck was unremarkable.  The urethra was carefully examined upon removing the cystoscope and was remarkable for evidence of prior urethral bulking.  Patient tolerated the procedure without complications noted.        A/P: 77 year old F with mixed incontinence, OAB not improved with tolterodine or PT, history of prior urethral bulking, history of colostomy with question of rectal prolapse    Trial of gemtesa as the tolterodine not working and causing bad dry mouth.  Not a candidate for mirabegron because of her blood pressure    See her general surgeon about question of rectal prolapse    RTC for UDS given the persistent symptoms    20 minutes were spent today on the date of the encounter in reviewing the EMR, direct patient care, coordination of  [facility]    Physical Therapy Daily Treatment Note  Date:  10/23/2017    Patient Name:  Clifton Nathan    :  1956  MRN: [id number]  Restrictions/Precautions:    Medical/Treatment Diagnosis Information:  Diagnosis: M25.572 L ankle pain  Treatment Diagnosis: L ankle pain, s/p L pilon fx ORIF   Insurance/Certification information:   [facility]  English       []other:    Visit # Insurance Allowable   7 30     Pain level:  /10     care and documentation in addition to the cystoscopy procedure    Mishel Zendejas MD MPH  (she/her/hers)   of Urology  [facility]      CC  Patient Care Team:  Shola Benoit MD as PCP - General (Internal Medicine)  Shola Benoit MD as Assigned PCP  Renetta Meyer MD as MD (Dermatology)  ROMI Roque MD as MD (INTERNAL MEDICINE - ENDOCRINOLOGY, DIABETES & METABOLISM)  Ulises Pantoja MD as MD (Gastroenterology)  Kishore Ferrera MD as MD (Orthopedics)  Tara Cornejo, RN as Diabetes Educator (Diabetes Education)  Catrachita Prather, PharmD as Pharmacist (Pharmacist)  Odette Weston MD as MD (Endocrinology, Diabetes, and Metabolism)  Odette Weston MD as Assigned Endocrinology Provider  Goltz, Bennett Ezra, PA-C as Assigned Neuroscience Provider  Terra Bridges PA-C as Physician Assistant (Urology)  Amber Chan APRN CNP as Assigned Heart and Vascular Provider  Terra Bridges PA-C as Assigned Surgical Provider                   flexibility, endurance, ROM for improvements in LE, proximal hip, and core control with self care, mobility, lifting, ambulation.  [] (12312) Provided verbal/tactile cueing for activities related to improving balance, coordination, kinesthetic sense, posture, motor skill, proprioception  to assist with LE, proximal hip, and core control in self care, mobility, lifting, ambulation and eccentric single leg control. NMR and Therapeutic Activities:    [] (59271 or 02369) Provided verbal/tactile cueing for activities related to improving balance, coordination, kinesthetic sense, posture, motor skill, proprioception and motor activation to allow for proper function of core, proximal hip and LE with self care and ADLs  [] (14093) Gait Re-education- Provided training and instruction to the patient for proper LE, core and proximal hip recruitment and positioning and eccentric body weight control with ambulation re-education including up and down stairs     Home Exercise Program:    [x] (76689) Reviewed/Progressed HEP activities related to strengthening, flexibility, endurance, ROM of core, proximal hip and LE for functional self-care, mobility, lifting and ambulation/stair navigation   [] (80037)Reviewed/Progressed HEP activities related to improving balance, coordination, kinesthetic sense, posture, motor skill, proprioception of core, proximal hip and LE for self care, mobility, lifting, and ambulation/stair navigation      Manual Treatments:  PROM / STM / Oscillations-Mobs:  G-I, II, III, IV (PA's, Inf., Post.)  [x] (97352) Provided manual therapy to mobilize LE, proximal hip and/or LS spine soft tissue/joints for the purpose of modulating pain, promoting relaxation,  increasing ROM, reducing/eliminating soft tissue swelling/inflammation/restriction, improving soft tissue extensibility and allowing for proper ROM for normal function with self care, mobility, lifting and ambulation.      Modalities:  Ice x10 min to L foot    Charges:  Timed Code Treatment Minutes: 40   Total Treatment Minutes: 50     [] EVAL (LOW) 00901 (typically 20 minutes face-to-face)  [x] OM(20947) x  2   [] IONTO  [] NMR (11859) x      [] VASO  [x] Manual (87381) x  1    [] Other:  [] TA x       [] Mech Traction (27076)  [] ES(attended) (13143)      [] ES (un) (14943):     Zacarias Churchill PT

## 2023-05-17 NOTE — NURSING NOTE
Prior to the start of the procedure and with procedural staff participation, I verbally confirmed the patient s identity using two indicators, relevant allergies, that the procedure was appropriate and matched the consent or emergent situation, and that the correct equipment/implants were available. Immediately prior to starting the procedure I conducted the Time Out with the procedural staff and re-confirmed the patient s name, procedure, and site/side. I have wiped the patient off with the povidone-Iodine solution, draped them,  used Lidocaine hydrochloride jelly, and instilled sterile water into the bladder. (The Joint Commission universal protocol was followed.)  Yes    Sedation (Moderate or Deep): None    IRMA Tuttle CMA

## 2023-05-18 ENCOUNTER — NURSE TRIAGE (OUTPATIENT)
Dept: FAMILY MEDICINE | Facility: CLINIC | Age: 78
End: 2023-05-18
Payer: MEDICARE

## 2023-05-18 ENCOUNTER — MYC MEDICAL ADVICE (OUTPATIENT)
Dept: FAMILY MEDICINE | Facility: CLINIC | Age: 78
End: 2023-05-18
Payer: MEDICARE

## 2023-05-18 NOTE — TELEPHONE ENCOUNTER
Agree with plan for appointment tomorrow.  If pain does not remit or develops any abrupt change in her symptoms she go to emergency room

## 2023-05-18 NOTE — TELEPHONE ENCOUNTER
Patient sent my chart with increased back pain and nausea per her my chart message.  Disposition states to be seen today or tomorrow. Patient states not able to come in today related to uses metro mobility. Scheduled appointment for tomorrow.     Unsure if PCP would rather her be seen more urgently related to nausea component. Please advise.    .  Appointments in Next Year    May 19, 2023 11:00 AM  (Arrive by 10:40 AM)  Provider Visit with Maude Ortiz PA-C  [facility] ([facility] ) [phone]   May 22, 2023 11:00 AM  (Arrive by 10:55 AM)  Return Cardiology with MAGALY Titus CNP  [facility] ([facility] ) [phone]   May 24, 2023  2:00 PM  (Arrive by 1:45 PM)  Return Patient with Laura Church MD  [facility] [place] ([facility] ) [phone]   Jun 02, 2023 11:40 AM  (Arrive by 11:25 AM)  RETURN HAIRLOSS with Renetta Meyer MD  [facility] [place] ([facility] ) [phone]   Jun 21, 2023 11:30 AM  RETURN DIABETES with Odette Weston MD  [facility] ([facility] ) [phone]   Jul 12, 2023 11:30 AM  (Arrive by 11:10 AM)  Provider Visit with Shola Benoit MD  [facility] ([facility] ) [phone]   Sep 27, 2023 11:00 AM  RETURN DIABETES with Odette Weston MD  [facility] ([facility] ) [phone]   Dec 20, 2023 11:00 AM  RETURN DIABETES with Odette Weston MD  [facility] ([facility] ) [phone]   Mar 20, 2024 11:00 AM  RETURN DIABETES with Odette Weston MD  [facility] [place] ([facility] )  [phone]          Dee Handy RN on 5/18/2023 at 11:15 AM      Reason for Disposition    Age > 50 and no history of prior similar back pain    Additional Information    Negative: Passed out (i.e., fainted, collapsed and was not responding)    Negative: Shock suspected (e.g., cold/pale/clammy skin, too weak to stand, low BP, rapid pulse)    Negative: Sounds like a life-threatening emergency to the triager    Negative: Major injury to the back (e.g., MVA, fall > 10 feet or 3 meters, penetrating injury, etc.)    Negative: Pain in the upper back over the ribs (rib cage) that radiates (travels) into the chest    Negative: Pain in the upper back over the ribs (rib cage) and worsened by coughing (or clearly increases with breathing)    Negative: Back pain during pregnancy    Negative: SEVERE back pain of sudden onset and age > 60 years    Negative: SEVERE abdominal pain (e.g., excruciating)    Negative: Abdominal pain and age > 60 years    Negative: Unable to urinate (or only a few drops) and bladder feels very full    Negative: Loss of bladder or bowel control (urine or bowel incontinence; wetting self, leaking stool) of new-onset    Negative: Numbness (loss of sensation) in groin or rectal area    Negative: Pain radiates into groin, scrotum    Negative: Blood in urine (red, pink, or tea-colored)    Negative: Vomiting and pain over lower ribs of back (i.e., flank - kidney area)    Negative: Weakness of a leg or foot (e.g., unable to bear weight, dragging foot)    Negative: Patient sounds very sick or weak to the triager    Negative: Fever > 100.4 F (38.0 C) and flank pain    Negative: Pain or burning with passing urine (urination)    Negative: SEVERE back pain (e.g., excruciating, unable to do any normal activities) and not improved after pain medicine and CARE ADVICE    Negative: Numbness in an arm or hand (i.e., loss of sensation) and upper back pain    Negative: Numbness in a leg or foot (i.e., loss of  "sensation)    Negative: High-risk adult (e.g., history of cancer, history of HIV, or history of IV Drug Use)    Negative: Soft tissue infection (e.g., abscess, cellulitis) or other serious infection (e.g., bacteremia) in last 2 weeks    Negative: Painful rash with multiple small blisters grouped together (i.e., dermatomal distribution or 'band' or 'stripe')    Negative: Pain radiates into the thigh or further down the leg, and in both legs    Answer Assessment - Initial Assessment Questions  1. ONSET: \"When did the pain begin?\"       On Tuesday  2. LOCATION: \"Where does it hurt?\" (upper, mid or lower back)      On the back, left side  3. SEVERITY: \"How bad is the pain?\"  (e.g., Scale 1-10; mild, moderate, or severe)    - MILD (1-3): doesn't interfere with normal activities     - MODERATE (4-7): interferes with normal activities or awakens from sleep     - SEVERE (8-10): excruciating pain, unable to do any normal activities       Moderate  4. PATTERN: \"Is the pain constant?\" (e.g., yes, no; constant, intermittent)       constant  5. RADIATION: \"Does the pain shoot into your legs or elsewhere?\"      No  6. CAUSE:  \"What do you think is causing the back pain?\"       I don't know  7. BACK OVERUSE:  \"Any recent lifting of heavy objects, strenuous work or exercise?\"      No  8. MEDICATIONS: \"What have you taken so far for the pain?\" (e.g., nothing, acetaminophen, NSAIDS)     Acetaminophen, extra strength. Two of them every six hours.  9. NEUROLOGIC SYMPTOMS: \"Do you have any weakness, numbness, or problems with bowel/bladder control?\"     I have urinary incontinence and a colostomy  10. OTHER SYMPTOMS: \"Do you have any other symptoms?\" (e.g., fever, abdominal pain, burning with urination, blood in urine)        No  11. PREGNANCY: \"Is there any chance you are pregnant?\" (e.g., yes, no; LMP)        No    Protocols used: BACK PAIN-A-OH      "

## 2023-05-18 NOTE — TELEPHONE ENCOUNTER
Sammie BECK  Triage Im (supporting Kaycee, Shola Coyle MD) 9 hours ago (1:47 AM)       On Tuesday, May 16th I had severe pain in my left side and back that lasted for about six to seven hours. When I woke up Wednesday morning it was gone and I had an appointment with Dr. Soto from [facility]. I was going to ask her about it but it seemed it was gone so I didn t say anything. Then late in the evening the pain started up again and has been continuous for three hours now and is severe enough that it takes my breath away. I haven t been able to get to sleep with it. Not sure what it is. I don t have any burning when I urinate. I do fee a little nauseous. Do you have any suggestions.

## 2023-05-19 ENCOUNTER — OFFICE VISIT (OUTPATIENT)
Dept: FAMILY MEDICINE | Facility: CLINIC | Age: 78
End: 2023-05-19
Payer: MEDICARE

## 2023-05-19 ENCOUNTER — TELEPHONE (OUTPATIENT)
Dept: UROLOGY | Facility: CLINIC | Age: 78
End: 2023-05-19

## 2023-05-19 VITALS
OXYGEN SATURATION: 100 % | TEMPERATURE: 97 F | DIASTOLIC BLOOD PRESSURE: 78 MMHG | HEART RATE: 65 BPM | RESPIRATION RATE: 16 BRPM | SYSTOLIC BLOOD PRESSURE: 138 MMHG | BODY MASS INDEX: 41.7 KG/M2 | HEIGHT: 60 IN | WEIGHT: 212.4 LBS

## 2023-05-19 DIAGNOSIS — R10.9 LEFT FLANK PAIN: Primary | ICD-10-CM

## 2023-05-19 LAB
ALBUMIN UR-MCNC: NEGATIVE MG/DL
ANION GAP SERPL CALCULATED.3IONS-SCNC: 10 MMOL/L (ref 7–15)
APPEARANCE UR: CLEAR
BILIRUB UR QL STRIP: NEGATIVE
BUN SERPL-MCNC: 32 MG/DL (ref 8–23)
CALCIUM SERPL-MCNC: 9.6 MG/DL (ref 8.8–10.2)
CHLORIDE SERPL-SCNC: 102 MMOL/L (ref 98–107)
COLOR UR AUTO: YELLOW
CREAT SERPL-MCNC: 1.18 MG/DL (ref 0.51–0.95)
DEPRECATED HCO3 PLAS-SCNC: 24 MMOL/L (ref 22–29)
GFR SERPL CREATININE-BSD FRML MDRD: 48 ML/MIN/1.73M2
GLUCOSE SERPL-MCNC: 212 MG/DL (ref 70–99)
GLUCOSE UR STRIP-MCNC: NEGATIVE MG/DL
HGB UR QL STRIP: NEGATIVE
KETONES UR STRIP-MCNC: NEGATIVE MG/DL
LEUKOCYTE ESTERASE UR QL STRIP: NEGATIVE
NITRATE UR QL: NEGATIVE
PH UR STRIP: 5.5 [PH] (ref 5–7)
POTASSIUM SERPL-SCNC: 4.9 MMOL/L (ref 3.4–5.3)
RBC #/AREA URNS AUTO: NORMAL /HPF
SODIUM SERPL-SCNC: 136 MMOL/L (ref 136–145)
SP GR UR STRIP: 1.02 (ref 1–1.03)
UROBILINOGEN UR STRIP-ACNC: 0.2 E.U./DL
WBC #/AREA URNS AUTO: NORMAL /HPF

## 2023-05-19 PROCEDURE — 36415 COLL VENOUS BLD VENIPUNCTURE: CPT | Performed by: PHYSICIAN ASSISTANT

## 2023-05-19 PROCEDURE — 80048 BASIC METABOLIC PNL TOTAL CA: CPT | Performed by: PHYSICIAN ASSISTANT

## 2023-05-19 PROCEDURE — 99214 OFFICE O/P EST MOD 30 MIN: CPT | Performed by: PHYSICIAN ASSISTANT

## 2023-05-19 PROCEDURE — 81001 URINALYSIS AUTO W/SCOPE: CPT | Performed by: PHYSICIAN ASSISTANT

## 2023-05-19 ASSESSMENT — PATIENT HEALTH QUESTIONNAIRE - PHQ9
SUM OF ALL RESPONSES TO PHQ QUESTIONS 1-9: 2
SUM OF ALL RESPONSES TO PHQ QUESTIONS 1-9: 2
10. IF YOU CHECKED OFF ANY PROBLEMS, HOW DIFFICULT HAVE THESE PROBLEMS MADE IT FOR YOU TO DO YOUR WORK, TAKE CARE OF THINGS AT HOME, OR GET ALONG WITH OTHER PEOPLE: NOT DIFFICULT AT ALL

## 2023-05-19 ASSESSMENT — PAIN SCALES - GENERAL: PAINLEVEL: SEVERE PAIN (6)

## 2023-05-19 NOTE — TELEPHONE ENCOUNTER
Spoke with pt, she needs to call us back to schedule UDS an cysto same day. Provided uro scheduling number.

## 2023-05-19 NOTE — RESULT ENCOUNTER NOTE
Gerard Cochran,     Your recent urine test was negative for signs of infection or blood.    Please let us know if you have any questions or concerns.    Regards,  Maude Ortiz PA-C

## 2023-05-19 NOTE — PROGRESS NOTES
Assessment and Plan:     (R10.9) Left flank pain  (primary encounter diagnosis)  Comment: Onset about 4 days ago, no trauma, no infectious complaints, no new neurological symptoms, is actually much better today, does not seem positional, she is tender to the touch on the left side, she had a UA earlier this week that was negative for signs of infection and had no blood, she denies any history of kidney stones,  Plan: UA with Microscopic reflex to Culture - lab         collect, Basic metabolic panel  (Ca, Cl, CO2,         Creat, Gluc, K, Na, BUN), CT Abdomen Pelvis w/o        Contrast, UA Microscopic with Reflex to Culture  Will obtain repeat UA today to see if any blood in urine  BMP to evaluate kidney function  CT scan noncontrast in the next week if pain persists  Tylenol as needed  Discussed when to be seen promptly      Maude Ortiz PA-C  30 minutes on the day of the encounter doing chart review, history and exam, documentation and further activities as noted above.        Raheem Cochran is a 77 year old, presenting for the following health issues:  Musculoskeletal Problem (Left side back pain)         View : No data to display.              Musculoskeletal Problem    History of Present Illness       Back Pain:  She presents for follow up of back pain. Patient's back pain is a new problem.    Original cause of back pain: not sure  First noticed back pain: in the last week  Patient feels back pain: comes and goesLocation of back pain:  Left middle of back and left side of waist  Description of back pain: dull ache and other  Back pain spreads: nowhere    Since patient first noticed back pain, pain is: always present, but gets better and worse  Does back pain interfere with her job:  No  On a scale of 1-10 (10 being the worst), patient describes pain as:  6  What makes back pain worse: certain positions  Acetaminophen: helpful  Activity or exercise: not helpful  Chiropractor:  Not tried  Cold: not  tried  Heat: helpful  Massage: not tried  Muscle relaxants: helpful  NSAIDS: not tried  Opioids: not tried  Physical Therapy: not tried  Rest: not helpful  Steroid Injection: not tried  Stretching: not helpful  Surgery: not tried  TENS unit: not tried  Topical pain relievers: not helpful  Other healthcare providers patient is seeing for back pain: None    She eats 2-3 servings of fruits and vegetables daily.She consumes 0 sweetened beverage(s) daily.She exercises with enough effort to increase her heart rate 10 to 19 minutes per day.  She exercises with enough effort to increase her heart rate 3 or less days per week.   She is taking medications regularly.    Today's PHQ-9         PHQ-9 Total Score: 2    PHQ-9 Q9 Thoughts of better off dead/self-harm past 2 weeks :   Not at all    How difficult have these problems made it for you to do your work, take care of things at home, or get along with other people: Not difficult at all     Sammie is here for back pain  It started on Monday, 4 days ago  It is left-sided, just above her waist, it wraps around and radiates to her abdomen  The pain has been intermittent  The pain is intense when it comes  It is now much better  It does not seem to be positional   She denies history of kidney stones  She denies kamala hematuria, dysuria, frequency, fever/chills, saddle anesthesia   She has chronic, unchanged in continence   She has chronic right sided lower ext weakness, she denies any new changes over the last week  She denies trauma   She denies bloody/black stool, shortness of breath or chest pain    She hasn't recalled passing a stone    She had an MRI of her back in the last 1-2 weeks, she follows with Dr. Sousa at  [facility]        Review of Systems   See above      Objective    There were no vitals taken for this visit.  There is no height or weight on file to calculate BMI.     /78   Pulse 65   Temp 97  F (36.1  C) (Tympanic)   Resp 16   Ht 1.524 m (5')   Wt 96.3  kg (212 lb 6.4 oz)   SpO2 100%   BMI 41.48 kg/m      Physical Exam     GENERAL: in NAD  RESP: lungs clear to auscultation - no rales, no rhonchi, no wheezes  CV: regular rates and rhythm, normal S1 S2, no S3 or S4 and no murmur, no click or rub   ABD: soft, NT,m +BS, has ostomy pouch  MS: extremities- no gross deformities noted, lymphedema bilat, non-tender, no open sores  SPINE:  No visual deformities, swelling, erythema or skin lesions over the thoracic and lumbar spine  No pain with palpation over spinous processes of the thoracic and lumbar spine, she does have tenderness with palpation of the left paraspinal/lower rib/CVA areas  Sensation intact bilateral lower extremities

## 2023-05-22 ENCOUNTER — OFFICE VISIT (OUTPATIENT)
Dept: CARDIOLOGY | Facility: CLINIC | Age: 78
End: 2023-05-22
Attending: NURSE PRACTITIONER
Payer: MEDICARE

## 2023-05-22 VITALS
WEIGHT: 213.1 LBS | OXYGEN SATURATION: 98 % | DIASTOLIC BLOOD PRESSURE: 64 MMHG | HEIGHT: 60 IN | BODY MASS INDEX: 41.84 KG/M2 | SYSTOLIC BLOOD PRESSURE: 196 MMHG | HEART RATE: 71 BPM

## 2023-05-22 DIAGNOSIS — R07.89 ATYPICAL CHEST PAIN: Primary | ICD-10-CM

## 2023-05-22 DIAGNOSIS — I10 BENIGN ESSENTIAL HYPERTENSION: ICD-10-CM

## 2023-05-22 DIAGNOSIS — R00.2 PALPITATIONS: ICD-10-CM

## 2023-05-22 PROCEDURE — 99214 OFFICE O/P EST MOD 30 MIN: CPT | Performed by: NURSE PRACTITIONER

## 2023-05-22 RX ORDER — CHLORTHALIDONE 25 MG/1
12.5 TABLET ORAL DAILY
Qty: 15 TABLET | Refills: 0 | Status: SHIPPED | OUTPATIENT
Start: 2023-05-22 | End: 2023-06-29

## 2023-05-22 ASSESSMENT — PAIN SCALES - PAIN ENJOYMENT GENERAL ACTIVITY SCALE (PEG)
INTERFERED_ENJOYMENT_LIFE: 7
INTERFERED_GENERAL_ACTIVITY: 8
INTERFERED_GENERAL_ACTIVITY: 8
PEG_TOTALSCORE: 7.67
INTERFERED_ENJOYMENT_LIFE: 7
AVG_PAIN_PASTWEEK: 8
PEG_TOTALSCORE: 7.67
AVG_PAIN_PASTWEEK: 8

## 2023-05-22 ASSESSMENT — ANXIETY QUESTIONNAIRES
7. FEELING AFRAID AS IF SOMETHING AWFUL MIGHT HAPPEN: NOT AT ALL
2. NOT BEING ABLE TO STOP OR CONTROL WORRYING: NOT AT ALL
3. WORRYING TOO MUCH ABOUT DIFFERENT THINGS: NOT AT ALL
6. BECOMING EASILY ANNOYED OR IRRITABLE: NOT AT ALL
7. FEELING AFRAID AS IF SOMETHING AWFUL MIGHT HAPPEN: NOT AT ALL
5. BEING SO RESTLESS THAT IT IS HARD TO SIT STILL: NOT AT ALL
1. FEELING NERVOUS, ANXIOUS, OR ON EDGE: SEVERAL DAYS
GAD7 TOTAL SCORE: 1
4. TROUBLE RELAXING: NOT AT ALL
GAD7 TOTAL SCORE: 1
8. IF YOU CHECKED OFF ANY PROBLEMS, HOW DIFFICULT HAVE THESE MADE IT FOR YOU TO DO YOUR WORK, TAKE CARE OF THINGS AT HOME, OR GET ALONG WITH OTHER PEOPLE?: NOT DIFFICULT AT ALL
IF YOU CHECKED OFF ANY PROBLEMS ON THIS QUESTIONNAIRE, HOW DIFFICULT HAVE THESE PROBLEMS MADE IT FOR YOU TO DO YOUR WORK, TAKE CARE OF THINGS AT HOME, OR GET ALONG WITH OTHER PEOPLE: NOT DIFFICULT AT ALL

## 2023-05-22 NOTE — PROGRESS NOTES
Cardiology Clinic Progress Note  Chasity Hodgson MRN# [id number]   YOB: 1945 Age: 77 year old     Reason For Visit:  6 week follow up   Primary Cardiologist:   Dr. Bhatia           History of Presenting Illness:      Chasity Hodgson is a pleasant 77 year old patient who carries a past medical history significant for nonocclusive coronary artery disease, chronic kidney disease, hypertension, PAD, diabetes, hyperlipidemia, hypothyroidism, obstructive sleep apnea, and childhood polio.    She was last seen on 4/7/2023 for evaluation of left-sided chest pain radiating to her left arm and palpitations.  She underwent a nuclear stress test that was negative for inducible myocardial ischemia or infarction.  She also wore a 3 day Zio Patch monitor that showed rare PACs/PVCs and no other significant arrhythmias. Echocardiogram (3/2023) showed a normal ejection fraction estimated at 65 to 70%, normal RV size and function, no significant valvular disease.     She returns to the office today for review of results.  Her primary complaint is uncontrolled blood pressure with home pressures ranging between 1 40-1 90 systolic.  Blood pressure today was 196/64 accompanied by headache.  She denies chest pain, shortness of breath, palpitations, PND, orthopnea, presyncope, or syncope.  She has chronic lower extremity edema managed with compression stockings.  She has not used as needed Lasix.    She has been dealing with uncontrolled diabetes. A1C 7.1. Follows with endocrinlogy.     She does not engage in any routine exercise.  Walks with a wheeled walker.   Remains compliant with all medications.                   Assessment and Plan:     1.  Atypical chest pain - Resolved. Nuclear stress test was negative for inducible myocardial ischemia or infarction. Echocardiogram (3/2023) showed a normal ejection fraction estimated at 65 to 70%, normal RV size and function, no significant valvular disease. Continue with current medical  therapy.     2.  Palpitations-  3-day Zio patch monitor showed rare PAC/PVC's, no significant arrhythmias.   3.  Hypertension - Uncontolled. Add 12.5 mg chlorthalidone to carvedilol and lisinopril. Follow up BMP in 1 week. Monitor BP daily with a goal of < 140/90. If BP remains uncontrolled despite medical therapy notify office. If BP is > 200 systolic seek ER evaluation immediately.              Thank you for allowing me to participate in this delightful patient's care. I have recommended she follow up in 1 month with CHRIS for review of results.       Amber Chan, APRN CNP         Review of Systems:     Review of Systems:  Skin:        Eyes:       ENT:       Respiratory:  Negative    Cardiovascular:    chest pain;Positive for;edema;fatigue;lightheadedness;palpitations  Gastroenterology:      Genitourinary:       Musculoskeletal:       Neurologic:  Positive for headaches  Psychiatric:  Positive for excessive stress  Heme/Lymph/Imm:       Endocrine:                   Physical Exam:     GEN:  In general, this is a obese female in no acute distress.  HEENT:  Pupils equal, round. Sclerae nonicteric. Clear oropharynx. Mucous membranes moist.  NECK: Supple, no masses appreciated. Trachea midline.  No JVD   C/V:  Regular rate and rhythm, no murmur, rub or gallop. No S3 or RV heave.   RESP: Respirations are unlabored. No use of accessory muscles. Clear to auscultation bilaterally without wheezing, rales, or rhonchi.  GI: Abdomen soft, nontender, nondistended. No HSM appreciated.   EXTREM: 1+ bilateral LE edema. No cyanosis or clubbing.  NEURO: Alert and oriented, cooperative. Gait steady with wheeled walker. No obvious focal deficits.   PSYCH: Normal affect.  SKIN: Warm and dry. No rashes or petechiae appreciated.          Past Medical History:     Past Medical History:   Diagnosis Date     Abdominal adhesions 1984, 96,99    s/p lysis     Abdominal adhesions      Acne rosacea      Allergic rhinitis      Allergic  rhinitis, cause unspecified      Alopecia      Anemia      CAD (coronary artery disease)      Carotid stenosis      CKD (chronic kidney disease) stage 2, GFR 60-89 ml/min      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)      CPAP (continuous positive airway pressure) dependence      Depression      Diabetic gastroparesis (H)      Diet-controlled type 2 diabetes mellitus (H)      DM2 (diabetes mellitus, type 2) (H)      DVT (deep venous thrombosis) (H)      DVT of axillary vein, acute right (H)      Fibromyalgia      Gastro-oesophageal reflux disease      GERD (gastroesophageal reflux disease)      Hernia of unspecified site of abdominal cavity without mention of obstruction or gangrene     bilateral     Hernia, abdominal      History of blood transfusion     10/ 1980     History of thrombophlebitis      HTN (hypertension)      HTN (hypertension)      Hypertriglyceridemia      Hypertriglyceridemia      Hypokalemia      Hypothyroidism      Mumps      Obstructive sleep apnea      ANNETTE (obstructive sleep apnea)      ANNETTE on CPAP      Osteoarthritis of glenohumeral joint      Papillary carcinoma of thyroid (H)     s/p thyroidectomy - Ruegemer     Papillary carcinoma of thyroid (H)      PE (pulmonary embolism)      Poliomyelitis     poor circulation right leg     Poliomyelitis      Postsurgical hypothyroidism     s/p papillary thryoid cancer - Ruegemer     Pulmonary embolism (H)      Pulmonary embolus (H)      Pulmonary nodule      Rosacea      S/P carpal tunnel release     bilateral     S/P hardware removal 01/2014    still with lingering foot pain     S/P shoulder surgery     bilateral     Septic joint (H)     right knee     Septic joint of right knee joint (H)      Venous insufficiency      Venous insufficiency      Venous thrombosis 1999    right axillary vein              Past Surgical History:     Past Surgical History:   Procedure Laterality Date     AMPUTATE TOE(S)  03/15/2012    Procedure:AMPUTATE TOE(S); Surgeon:RUBEN MOSES  MONTSERRAT; Location: OR     AMPUTATE TOE(S)       APPENDECTOMY  1972     APPENDECTOMY       ARTHRODESIS FOOT  03/15/2012    Procedure:ARTHRODESIS FOOT; RIGHT TRIPLE ARTHRODESIS, FIFTH TOE AMPUTATION, LATERAL LIGAMENT RECONSTRUCTION, TENDON TRANSFER AND RELEASE [MINI C-ARM, ARTHREX 4.5 AND 6.7 STAPLES, BIOCOMPOSITE TENODESIS SCREWS]; Surgeon:SUKHDEEP METZ; Location: OR     ARTHRODESIS FOOT Right     Right foot triple arthrodesis and removal of hardware     ARTHROSCOPY SHOULDER  06/25/2015    REVISION SUBACROMIAL DECOMPRESSION, EXCISION OF GANGLION CYST, DEBRIDEMENT AND EXCISION OF THE GLENOHUMERAL JOINT GANGLION CYST, CORACOID DECOMPRESSION, POSSIBLE SUBSCAPULARIS REPAIR AND OPEN SUBSCAPULARIS BICEP     ARTHROSCOPY SHOULDER ROTATOR CUFF REPAIR       BIOPSY  Thyroid 2002     BLADDER SURGERY       BREAST SURGERY  Biopsy     CHOLECYSTECTOMY       CHOLECYSTECTOMY       COLONOSCOPY  2018     COLOSTOMY  02/07/2012    Procedure:COLOSTOMY; CREATION OF SIGMOID COLOSTOMY AND EXTENSIVE  LYSIS OF ADHESIONS; Surgeon:MONTSERRAT BENDER; Location: OR     COLOSTOMY       CYSTOSCOPY       EYE SURGERY       GENITOURINARY SURGERY  1999     GI SURGERY      weakened rectal sphincter with artificial stimulator     HERNIA REPAIR  1976     HYSTERECTOMY TOTAL ABDOMINAL       LAPAROTOMY, LYSIS ADHESIONS, COMBINED  02/07/2012    Procedure:COMBINED LAPAROTOMY, LYSIS ADHESIONS; Surgeon:MONTSERRAT BENDER P; Location: OR     RELEASE CARPAL TUNNEL       RELEASE TENDON FOOT  03/15/2012    Procedure:RELEASE TENDON FOOT; Surgeon:SUKHDEEP METZ; Location: OR     REMOVE HARDWARE FOOT  12/13/2012    Procedure: REMOVE HARDWARE FOOT;  RIGHT FOOT REMOVAL OF HARDWARE;  Surgeon: Sukhdeep Metz MD;  Location:  OR     SHOULDER SURGERY  11/12/2020    LEFT SHOULDER HEMIARHTROPLASTY, BICEP TENODESIS     SOFT TISSUE SURGERY  2018, 2020     TENDON RELEASE      foot     THYROIDECTOMY       [facility] FREEING BOWEL  ADHESION,ENTEROLYSIS      1986, 1996, 1999     ZZC NONSPECIFIC PROCEDURE      throidectomy     ZZC TOTAL ABDOM HYSTERECTOMY  1980    + BSO              Allergies:   Ketorolac tromethamine, Nsaids, Celecoxib, Morphine and related, Crestor [rosuvastatin], No clinical screening - see comments, Vioxx, Conjugated estrogens, and Sulfa antibiotics       Data:   All laboratory data reviewed:    LAST CHOLESTEROL:  Lab Results   Component Value Date    CHOL 155 03/09/2023    CHOL 196 03/29/2019     Lab Results   Component Value Date    HDL 39 03/09/2023    HDL 27 03/29/2019     Lab Results   Component Value Date    LDL 79 03/09/2023    LDL 99 03/29/2019     Lab Results   Component Value Date    TRIG 183 03/09/2023    TRIG 349 03/29/2019     Lab Results   Component Value Date    CHOLHDLRATIO 4.6 02/15/2017    CHOLHDLRATIO 4.6 03/31/2015       LAST BMP:  Lab Results   Component Value Date     03/09/2023     07/07/2021      Lab Results   Component Value Date    POTASSIUM 5.4 03/09/2023    POTASSIUM 5.0 10/12/2022    POTASSIUM 4.7 07/07/2021     Lab Results   Component Value Date    CHLORIDE 108 03/09/2023    CHLORIDE 109 10/12/2022    CHLORIDE 107 07/07/2021     Lab Results   Component Value Date    RINKU 9.4 03/09/2023    RINKU 9.4 07/07/2021     Lab Results   Component Value Date    CO2 24 03/09/2023    CO2 23 10/12/2022    CO2 24 07/07/2021     Lab Results   Component Value Date    BUN 37.3 03/09/2023    BUN 41 10/12/2022    BUN 27 07/07/2021     Lab Results   Component Value Date    CR 1.31 03/09/2023    CR 1.01 07/07/2021     Lab Results   Component Value Date     03/09/2023     10/12/2022     07/07/2021       LAST CBC:  Lab Results   Component Value Date    WBC 9.5 03/09/2023    WBC 7.2 04/26/2021     Lab Results   Component Value Date    RBC 4.04 03/09/2023    RBC 3.77 04/26/2021     Lab Results   Component Value Date    HGB 11.9 03/09/2023    HGB 11.7 04/26/2021     Lab Results   Component  Value Date    HCT 37.3 03/09/2023    HCT 35.0 04/26/2021     Lab Results   Component Value Date    MCV 92 03/09/2023    MCV 93 04/26/2021     Lab Results   Component Value Date    MCH 29.5 03/09/2023    MCH 31.0 04/26/2021     Lab Results   Component Value Date    MCHC 31.9 03/09/2023    MCHC 33.4 04/26/2021     Lab Results   Component Value Date    RDW 12.8 03/09/2023    RDW 12.5 04/26/2021     Lab Results   Component Value Date     03/09/2023     04/26/2021

## 2023-05-22 NOTE — PATIENT INSTRUCTIONS
Thanks for participating in a office visit with the [facility] clinic today.    Blood pressures uncontrolled, add low dose chlorthalidone 12.5 mg daily  Monitor blood pressures daily with a goal of < 140/90. Follow up BMP in 2 weeks.   Monitor lightheadedness, dizziness, or low blood pressure  Reviewed results of stress test- negative  Reviewed monitor showing no significant arrhythmias.     Follow up in 3-4 weeks with CHRIS    Please call my nurse at  [phone] with any questions or concerns.    Scheduling phone number: [phone]  Reminder: Please bring in all current medications, over the counter supplements and vitamin bottles to your next appointment.

## 2023-05-22 NOTE — LETTER
5/22/2023    Shola Benoit MD, MD  [address]  [facility] [address]    RE: Chasity Hodgson       Dear Colleague,     I had the pleasure of seeing Chasity Hodgson in the [facility].  Cardiology Clinic Progress Note  Chasity Hodgson MRN# [id number]   YOB: 1945 Age: 77 year old     Reason For Visit:  6 week follow up   Primary Cardiologist:   Dr. Bhatia           History of Presenting Illness:      Chasity Hodgson is a pleasant 77 year old patient who carries a past medical history significant for nonocclusive coronary artery disease, chronic kidney disease, hypertension, PAD, diabetes, hyperlipidemia, hypothyroidism, obstructive sleep apnea, and childhood polio.    She was last seen on 4/7/2023 for evaluation of left-sided chest pain radiating to her left arm and palpitations.  She underwent a nuclear stress test that was negative for inducible myocardial ischemia or infarction.  She also wore a 3 day Zio Patch monitor that showed rare PACs/PVCs and no other significant arrhythmias. Echocardiogram (3/2023) showed a normal ejection fraction estimated at 65 to 70%, normal RV size and function, no significant valvular disease.     She returns to the office today for review of results.  Her primary complaint is uncontrolled blood pressure with home pressures ranging between 1 40-1 90 systolic.  Blood pressure today was 196/64 accompanied by headache.  She denies chest pain, shortness of breath, palpitations, PND, orthopnea, presyncope, or syncope.  She has chronic lower extremity edema managed with compression stockings.  She has not used as needed Lasix.    She has been dealing with uncontrolled diabetes. A1C 7.1. Follows with endocrinlogy.     She does not engage in any routine exercise.  Walks with a wheeled walker.   Remains compliant with all medications.                   Assessment and Plan:     1.  Atypical chest pain - Resolved. Nuclear stress test was negative for  inducible myocardial ischemia or infarction. Echocardiogram (3/2023) showed a normal ejection fraction estimated at 65 to 70%, normal RV size and function, no significant valvular disease. Continue with current medical therapy.     2.  Palpitations-  3-day Zio patch monitor showed rare PAC/PVC's, no significant arrhythmias.   3.  Hypertension - Uncontolled. Add 12.5 mg chlorthalidone to carvedilol and lisinopril. Follow up BMP in 1 week. Monitor BP daily with a goal of < 140/90. If BP remains uncontrolled despite medical therapy notify office. If BP is > 200 systolic seek ER evaluation immediately.              Thank you for allowing me to participate in this delightful patient's care. I have recommended she follow up in 1 month with CHRIS for review of results.       Amber Chan, MAGALY CNP         Review of Systems:     Review of Systems:  Skin:        Eyes:       ENT:       Respiratory:  Negative    Cardiovascular:    chest pain;Positive for;edema;fatigue;lightheadedness;palpitations  Gastroenterology:      Genitourinary:       Musculoskeletal:       Neurologic:  Positive for headaches  Psychiatric:  Positive for excessive stress  Heme/Lymph/Imm:       Endocrine:                   Physical Exam:     GEN:  In general, this is a obese female in no acute distress.  HEENT:  Pupils equal, round. Sclerae nonicteric. Clear oropharynx. Mucous membranes moist.  NECK: Supple, no masses appreciated. Trachea midline.  No JVD   C/V:  Regular rate and rhythm, no murmur, rub or gallop. No S3 or RV heave.   RESP: Respirations are unlabored. No use of accessory muscles. Clear to auscultation bilaterally without wheezing, rales, or rhonchi.  GI: Abdomen soft, nontender, nondistended. No HSM appreciated.   EXTREM: 1+ bilateral LE edema. No cyanosis or clubbing.  NEURO: Alert and oriented, cooperative. Gait steady with wheeled walker. No obvious focal deficits.   PSYCH: Normal affect.  SKIN: Warm and dry. No rashes or petechiae  appreciated.          Past Medical History:     Past Medical History:   Diagnosis Date    Abdominal adhesions 1984, 96,99    s/p lysis    Abdominal adhesions     Acne rosacea     Allergic rhinitis     Allergic rhinitis, cause unspecified     Alopecia     Anemia     CAD (coronary artery disease)     Carotid stenosis     CKD (chronic kidney disease) stage 2, GFR 60-89 ml/min     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)     CPAP (continuous positive airway pressure) dependence     Depression     Diabetic gastroparesis (H)     Diet-controlled type 2 diabetes mellitus (H)     DM2 (diabetes mellitus, type 2) (H)     DVT (deep venous thrombosis) (H)     DVT of axillary vein, acute right (H)     Fibromyalgia     Gastro-oesophageal reflux disease     GERD (gastroesophageal reflux disease)     Hernia of unspecified site of abdominal cavity without mention of obstruction or gangrene     bilateral    Hernia, abdominal     History of blood transfusion     10/ 1980    History of thrombophlebitis     HTN (hypertension)     HTN (hypertension)     Hypertriglyceridemia     Hypertriglyceridemia     Hypokalemia     Hypothyroidism     Mumps     Obstructive sleep apnea     ANNETTE (obstructive sleep apnea)     ANNETTE on CPAP     Osteoarthritis of glenohumeral joint     Papillary carcinoma of thyroid (H)     s/p thyroidectomy - Ruegemer    Papillary carcinoma of thyroid (H)     PE (pulmonary embolism)     Poliomyelitis     poor circulation right leg    Poliomyelitis     Postsurgical hypothyroidism     s/p papillary thryoid cancer - Ruegemer    Pulmonary embolism (H)     Pulmonary embolus (H)     Pulmonary nodule     Rosacea     S/P carpal tunnel release     bilateral    S/P hardware removal 01/2014    still with lingering foot pain    S/P shoulder surgery     bilateral    Septic joint (H)     right knee    Septic joint of right knee joint (H)     Venous insufficiency     Venous insufficiency     Venous thrombosis 1999    right axillary vein              Past  Surgical History:     Past Surgical History:   Procedure Laterality Date    AMPUTATE TOE(S)  03/15/2012    Procedure:AMPUTATE TOE(S); Surgeon:SUKHDEEP METZ; Location: OR    AMPUTATE TOE(S)      APPENDECTOMY  1972    APPENDECTOMY      ARTHRODESIS FOOT  03/15/2012    Procedure:ARTHRODESIS FOOT; RIGHT TRIPLE ARTHRODESIS, FIFTH TOE AMPUTATION, LATERAL LIGAMENT RECONSTRUCTION, TENDON TRANSFER AND RELEASE [MINI C-ARM, ARTHREX 4.5 AND 6.7 STAPLES, BIOCOMPOSITE TENODESIS SCREWS]; Surgeon:SUKHDEEP METZ; Location: OR    ARTHRODESIS FOOT Right     Right foot triple arthrodesis and removal of hardware    ARTHROSCOPY SHOULDER  06/25/2015    REVISION SUBACROMIAL DECOMPRESSION, EXCISION OF GANGLION CYST, DEBRIDEMENT AND EXCISION OF THE GLENOHUMERAL JOINT GANGLION CYST, CORACOID DECOMPRESSION, POSSIBLE SUBSCAPULARIS REPAIR AND OPEN SUBSCAPULARIS BICEP    ARTHROSCOPY SHOULDER ROTATOR CUFF REPAIR      BIOPSY  Thyroid 2002    BLADDER SURGERY      BREAST SURGERY  Biopsy    CHOLECYSTECTOMY      CHOLECYSTECTOMY      COLONOSCOPY  2018    COLOSTOMY  02/07/2012    Procedure:COLOSTOMY; CREATION OF SIGMOID COLOSTOMY AND EXTENSIVE  LYSIS OF ADHESIONS; Surgeon:MONTSERRAT BENDER; Location: OR    COLOSTOMY      CYSTOSCOPY      EYE SURGERY      GENITOURINARY SURGERY  1999    GI SURGERY      weakened rectal sphincter with artificial stimulator    HERNIA REPAIR  1976    HYSTERECTOMY TOTAL ABDOMINAL      LAPAROTOMY, LYSIS ADHESIONS, COMBINED  02/07/2012    Procedure:COMBINED LAPAROTOMY, LYSIS ADHESIONS; Surgeon:MONTSERRAT BENDER; Location: OR    RELEASE CARPAL TUNNEL      RELEASE TENDON FOOT  03/15/2012    Procedure:RELEASE TENDON FOOT; Surgeon:SUKHDEEP METZ; Location: OR    REMOVE HARDWARE FOOT  12/13/2012    Procedure: REMOVE HARDWARE FOOT;  RIGHT FOOT REMOVAL OF HARDWARE;  Surgeon: Sukhdeep Metz MD;  Location:  OR    SHOULDER SURGERY  11/12/2020    LEFT SHOULDER HEMIARHTROPLASTY, BICEP  TENODESIS    SOFT TISSUE SURGERY  2018, 2020    TENDON RELEASE      foot    THYROIDECTOMY      ZZC FREEING BOWEL ADHESION,ENTEROLYSIS      1986, 1996, 1999    ZZC NONSPECIFIC PROCEDURE      throidectomy    ZZC TOTAL ABDOM HYSTERECTOMY  1980    + BSO              Allergies:   Ketorolac tromethamine, Nsaids, Celecoxib, Morphine and related, Crestor [rosuvastatin], No clinical screening - see comments, Vioxx, Conjugated estrogens, and Sulfa antibiotics       Data:   All laboratory data reviewed:    LAST CHOLESTEROL:  Lab Results   Component Value Date    CHOL 155 03/09/2023    CHOL 196 03/29/2019     Lab Results   Component Value Date    HDL 39 03/09/2023    HDL 27 03/29/2019     Lab Results   Component Value Date    LDL 79 03/09/2023    LDL 99 03/29/2019     Lab Results   Component Value Date    TRIG 183 03/09/2023    TRIG 349 03/29/2019     Lab Results   Component Value Date    CHOLHDLRATIO 4.6 02/15/2017    CHOLHDLRATIO 4.6 03/31/2015       LAST BMP:  Lab Results   Component Value Date     03/09/2023     07/07/2021      Lab Results   Component Value Date    POTASSIUM 5.4 03/09/2023    POTASSIUM 5.0 10/12/2022    POTASSIUM 4.7 07/07/2021     Lab Results   Component Value Date    CHLORIDE 108 03/09/2023    CHLORIDE 109 10/12/2022    CHLORIDE 107 07/07/2021     Lab Results   Component Value Date    RINKU 9.4 03/09/2023    RINKU 9.4 07/07/2021     Lab Results   Component Value Date    CO2 24 03/09/2023    CO2 23 10/12/2022    CO2 24 07/07/2021     Lab Results   Component Value Date    BUN 37.3 03/09/2023    BUN 41 10/12/2022    BUN 27 07/07/2021     Lab Results   Component Value Date    CR 1.31 03/09/2023    CR 1.01 07/07/2021     Lab Results   Component Value Date     03/09/2023     10/12/2022     07/07/2021       LAST CBC:  Lab Results   Component Value Date    WBC 9.5 03/09/2023    WBC 7.2 04/26/2021     Lab Results   Component Value Date    RBC 4.04 03/09/2023    RBC 3.77 04/26/2021      Lab Results   Component Value Date    HGB 11.9 03/09/2023    HGB 11.7 04/26/2021     Lab Results   Component Value Date    HCT 37.3 03/09/2023    HCT 35.0 04/26/2021     Lab Results   Component Value Date    MCV 92 03/09/2023    MCV 93 04/26/2021     Lab Results   Component Value Date    MCH 29.5 03/09/2023    MCH 31.0 04/26/2021     Lab Results   Component Value Date    MCHC 31.9 03/09/2023    MCHC 33.4 04/26/2021     Lab Results   Component Value Date    RDW 12.8 03/09/2023    RDW 12.5 04/26/2021     Lab Results   Component Value Date     03/09/2023     04/26/2021               Thank you for allowing me to participate in the care of your patient.      Sincerely,     MAGALY Titus CNP     [facility]  cc:   MAGALY Titus CNP  [address] [place] [address]

## 2023-05-23 ENCOUNTER — HOSPITAL ENCOUNTER (OUTPATIENT)
Dept: CT IMAGING | Facility: CLINIC | Age: 78
Discharge: HOME | End: 2023-05-23
Attending: PHYSICIAN ASSISTANT | Admitting: PHYSICIAN ASSISTANT
Payer: MEDICARE

## 2023-05-23 DIAGNOSIS — R10.9 LEFT FLANK PAIN: ICD-10-CM

## 2023-05-23 PROCEDURE — G1010 CDSM STANSON: HCPCS

## 2023-05-23 RX ORDER — VIBEGRON 75 MG/1
75 TABLET, FILM COATED ORAL DAILY
Qty: 30 TABLET | Refills: 3 | Status: CANCELLED | OUTPATIENT
Start: 2023-05-23

## 2023-05-23 NOTE — TELEPHONE ENCOUNTER
Central Prior Authorization Team  Phone: [phone]    PA Initiation    Medication: GEMTESA 75 MG PO TABS  Insurance Company:    Pharmacy Filling the Rx: [place], [place] - [address]  Filling Pharmacy Phone: [phone]  Filling Pharmacy Fax:    Start Date: 5/23/2023

## 2023-05-23 NOTE — RESULT ENCOUNTER NOTE
Dear Sammie,    Here are your recent CT scan results which are negative for signs of kidney stones or any other acute problem.      Please let us know if you have any questions or concerns.    I'll see you on Friday for follow-up.    Thanks,  Maude Ortiz PA-C

## 2023-05-23 NOTE — RESULT ENCOUNTER NOTE
Hello -    Here are my comments about the recent results: your basic metabolic panel shows reduced but stable kidney function.  Your electrolytes were normal.     Please let us know if you have any questions or concerns.    Regards,  Maude Ortiz PA-C

## 2023-05-23 NOTE — TELEPHONE ENCOUNTER
Prior Authorization Retail Medication Request     Pharmacy Information (if different than what is on RX)  Name:  [facility]  Phone:  [phone]     Thank you,  [facility] Pharmacy Technician          Prior Authorization Retail Medication Request     Pharmacy Information (if different than what is on RX)  Name:  [facility]  Phone:  [phone]     Thank you,  [facility] Pharmacy Technician

## 2023-05-24 ENCOUNTER — VIRTUAL VISIT (OUTPATIENT)
Dept: PALLIATIVE MEDICINE | Facility: CLINIC | Age: 78
End: 2023-05-24
Payer: MEDICARE

## 2023-05-24 DIAGNOSIS — G89.4 CHRONIC PAIN SYNDROME: Primary | ICD-10-CM

## 2023-05-24 PROCEDURE — 99215 OFFICE O/P EST HI 40 MIN: CPT | Mod: VID | Performed by: ANESTHESIOLOGY

## 2023-05-24 NOTE — PATIENT INSTRUCTIONS
Continue care with your other providers and follow up with me as needed.  If you wanted to trial lyrica again in the future I am sure your PCP or any of these providers would be happy to prescribe this for you.     Laura Church MD     ----------------------------------------------------------------  Clinic Number:  [phone]   Call with any questions about your care and for scheduling assistance.   Calls are returned Monday through Friday between 8 AM and 4:30 PM. We usually get back to you within 2 business days depending on the issue/request.    If we are prescribing your medications:  For opioid medication refills, call the clinic or send a [employer] message 7 days in advance.  Please include:  Name of requested medication  Name of the pharmacy.  For non-opioid medications, call your pharmacy directly to request a refill. Please allow 3-4 days to be processed.   Per [place] State Law:  All controlled substance prescriptions must be filled within 30 days of being written.    For those controlled substances allowing refills, pickup must occur within 30 days of last fill.      We believe regular attendance is key to your success in our program!    Any time you are unable to keep your appointment we ask that you call us at least 24 hours in advance to cancel.This will allow us to offer the appointment time to another patient.   Multiple missed appointments may lead to dismissal from the clinic.

## 2023-05-24 NOTE — PROGRESS NOTES
Sammie is a 77 year old who is being evaluated via a billable video visit.      How would you like to obtain your AVS? [employer]  If the video visit is dropped, the invitation should be resent by: Other e-mail: [employer]  Will anyone else be joining your video visit? No   Is Pt currently in [place]? Yes    NOTE:  If Pt is not in [place], Appointment needs to be canceled and rescheduled.    PREMA Cramer [facility]      Video-Visit Details    Type of service:  Video Visit   Video Start Time: 2:07 PM  Video End Time:2:46 PM  Originating Location (pt. Location): Home    Distant Location (provider location):  On-site   Platform used for Video Visit: Darrell GOMEZ [facility]      Date of visit: 5/24/2023    Chief complaint:   Chief Complaint   Patient presents with     Pain       Interval history:  Chasity Hodgson is a 77 year old female last seen by me for INITIAL CONSULT on 6/2/2022 and for FOLLOW UP on 12/1/2022 VIRTUAL VISIT.       Since her last visit, Chasity Hodgson reports:    - Doing okay  -The patients primary pain complaint is severe aching pain in her lower back and left upper side of back above her waist.  She also has extreme aching pain in her left knee and a bad ache in her left thigh, hip and groin area.  Her legs feel weaker. A lot of walking makes her pain unbearable specifically trying to go upstairs.  Laying down with her legs elevated helps to relieve the pain as does sitting in her recliner.   - He is seeing Dr. Sousa at  spine and she recently had a MRI done at [facility] last week that shows some narrowing in her spine.  They are recommending surgery but she needs to loose weight.    - She discontinued her LYRICA about a month ago because of swelling in her legs and when she stopped the swelling improved.   - Her pain has increased since discontinuing this. This has been the most helpful medication for her pain.   - She is  using Salonpas patches on her arm and back as needed  - She is also using taking tylenol 500mg tablets #4/day  - She is going to the weight management clinic.  She is at 205# and she wants to be at 190# before having any surgery.   - She is doing a daily HEP that was given to her after doing PT at [facility]  - Endocrinologist stoped ozempic (because she was worried about GI problems) and stared Regulouvia and she is having more leg swelling.   - She is also using Tresiba which is an injectable type of insulia  - She is seeing a cardiologist and heart problems have been ruled out as a cause of her bilateral LE edema.   - She is seeing Sharon Cook DO with [facility].  PM&R physician.   - She is seeing Dr. Ferrera at [facility] for left shoulder pain.   - Her  has Parkinson's and lives in a NH.  She visits him once a week normally.       Patient reported symptoms:  Patient Supplied Answers To the  Pain Questionnaire      5/22/2023     2:07 PM    Pain -  Patient Entered Questionnaire/Answers   What number best describes your pain right now:  0 = No pain  to  10 = Worst pain imaginable 9   How would you describe the pain sharp    dull, aching    other   Which of the following worsen your pain lying down    standing    sitting    exercise   Which of the following improve or reduce your pain medication    relaxation   What number best describes your average pain for the past week:  0 = No pain  to  10 = Worst pain imaginable 8   What number best describes your LOWEST pain in past 24 hours:  0 = No pain  to  10 = Worst pain imaginable 4   What number best describes your WORST pain in past 24 hours:  0 = No pain  to  10 = Worst pain imaginable 9   When is your pain worst PM    Night   What non-medicine treatments have you already had for your pain spine injections (shots)    other               [place]  REVIEWED TODAY: YES  LYRICA 25MG #28 3/2/2023    UDS and CONTRACT DONE - not needed    MEDICATIONS FOR  PAIN:   TYLENOL PRN    Previous medication trials:   LYRICA 50mg BID - leg swelling  Tizanidine - too sedating  Gabapentin - sedation     INJECTIONS/SURGERY:  RIGHT L5-S1 transforaminal epidural steroid injection @ [facility] 6/21/2022  LEFT L3-4 transforaminal epidural steroid injection @ [facility] 5/12/2022  Steroid knee injections - last 6/2022 with Dr. eHnderson @ [facility]    S/P left total hip replacement 9/14/2020 with Dr. Henderson at [facility].  S/P RIGHT total shoulder 11/12/2020  S/P carpal tunnel release    IMAGING:   LUMBAR MRI 5/11/2023 @ [facility]       CT RIGHT SHOULDER:  3/29/2023           Medications:  Current Outpatient Medications   Medication Sig Dispense Refill     acetaminophen (TYLENOL) 500 MG tablet Take 1,000 mg by mouth       albuterol (PROAIR HFA/PROVENTIL HFA/VENTOLIN HFA) 108 (90 Base) MCG/ACT inhaler albuterol sulfate HFA 90 mcg/actuation aerosol inhaler       amoxicillin (AMOXIL) 500 MG capsule        aspirin (ASA) 81 MG EC tablet Take 81 mg by mouth daily       azelastine (ASTEPRO) 0.15 % nasal spray 1 spray       carvedilol (COREG) 12.5 MG tablet Take 1 tablet (12.5 mg) by mouth 2 times daily (with meals) 180 tablet 3     chlorthalidone (HYGROTON) 25 MG tablet Take 0.5 tablets (12.5 mg) by mouth daily 15 tablet 0     clotrimazole (LOTRIMIN) 1 % cream Apply topically daily       Continuous Blood Gluc Sensor (FREESTYLE BRANDY 3 SENSOR) MISC 1 each every 14 days 6 each 3     diphenhydrAMINE-acetaminophen (TYLENOL PM)  MG tablet Take 1 tablet by mouth nightly as needed for sleep Reported on 3/20/2017       ezetimibe (ZETIA) 10 MG tablet Take 1 tablet (10 mg) by mouth daily 90 tablet 3     famotidine (PEPCID) 20 MG tablet Take 2 tablets (40 mg) by mouth as needed 180 tablet 3     fenofibrate (TRICOR) 145 MG tablet Take 1 tablet (145 mg) by mouth daily 90 tablet 3     ferrous sulfate (IRON) 325 (65 FE) MG tablet Take 325 mg by mouth daily (with breakfast)       fexofenadine (ALLEGRA) 180 MG  tablet Take 180 mg by mouth daily. 120 0     fluocinolone acetonide (DERMA SMOOTHE/FS BODY) 0.01 % external oil Apply 2 mL to scalp once per week. Massage into scalp. Can be left in overnight or washed out after 4-6 hours. 118.28 mL 5     fluticasone (FLONASE) 50 MCG/ACT spray Spray 2 sprays in nostril daily 2 sprays in each nostril qd 1 Bottle 0     furosemide (LASIX) 20 MG tablet Take 1 tablet (20 mg) by mouth daily as needed (lower extremity edema) 90 tablet 3     glipiZIDE (GLUCOTROL) 5 MG tablet Take by mouth every 12 hours       Icosapent Ethyl 1 g CAPS Take 1 g by mouth 2 times daily 60 capsule 11     insulin degludec (TRESIBA FLEXTOUCH) 100 UNIT/ML pen Inject 25 Units Subcutaneous every morning. 15 mL 3     insulin pen needle (B-D U/F) 31G X 5 MM miscellaneous Use 1 pen needles daily or as directed. 90 each 3     lisinopril (ZESTRIL) 40 MG tablet Take 1 tablet (40 mg) by mouth daily 90 tablet 3     MULTIPLE VITAMIN PO Take 1 tablet by mouth       nitroGLYcerin (NITROSTAT) 0.4 MG sublingual tablet Place 1 tablet (0.4 mg) under the tongue every 5 minutes as needed for chest pain (no more than 3 in one hour; after 3rd, call 911.) 25 tablet 3     nystatin (MYCOSTATIN) cream Apply topically daily as needed        nystatin-triamcinolone (MYCOLOG II) cream Apply topically daily as needed        ondansetron (ZOFRAN) 4 MG tablet Take 1 tablet by mouth every 6 hours as needed Reported on 3/20/2017       order for DME Equipment being ordered: Compression stockings - Knee High; 20-30 mmHg compression - note would like adhesive band to keep the stocking from sliding down 3 each 0     pantoprazole (PROTONIX) 40 MG EC tablet Take 40 mg by mouth 2 times daily       polyethylene glycol 400 (BLINK TEARS) 0.25 % SOLN ophthalmic solution        sitagliptin (JANUVIA) 50 MG tablet Take 1 tablet (50 mg) by mouth daily 90 tablet 3     sucralfate (CARAFATE) 1 GM/10ML suspension Take 1 g by mouth 4 times daily       SYNTHROID 112 MCG  tablet TAKE ONE-HALF TABLET BY MOUTH DAILY ON FRIDAYS AND TAKE ONE TABLET BY MOUTH ALL OTHER DAYS AS DIRECTED 90 tablet 3     tolterodine ER (DETROL LA) 4 MG 24 hr capsule Take 1 capsule (4 mg) by mouth daily 30 capsule 11     tretinoin (RETIN-A) 0.025 % external cream Use every night as tolerated - spot treat lesion 20 g 3     vibegron (GEMTESA) 75 MG TABS tablet Take 1 tablet (75 mg) by mouth daily 30 tablet 3     Vitamin D3 50 mcg (2000 units) tablet 1 tablet daily           Physical Exam:  not currently breastfeeding.      GENERAL: Healthy, alert and no distress  EYES: Eyes grossly normal to inspection.  No discharge or erythema, or obvious scleral/conjunctival abnormalities.  RESP: No audible wheeze, cough, or visible cyanosis.  No visible retractions or increased work of breathing.    SKIN: Visible skin clear. No significant rash, abnormal pigmentation or lesions.  NEURO: Cranial nerves grossly intact.  Mentation and speech appropriate for age.  PSYCH: Mentation appears normal, affect normal/bright, judgement and insight intact, normal speech and appearance well-groomed.         ASSESSMENT/PLAN:   Chasity Hodgson is a 77 year old female with a past medical history of childhood polio, CAD, CKD, DMII, SBO, thyroid carcinoma, HTN, h/o PE & bilateral LE edema who is being seen at the pain clinic for the following chronic pain conditions.      1. Chronic pain syndrome  The patient has chronic widespread pain.  Her most significant pain is low back and leg pain related to her severe central stenosis and neurogenic claudication.  She is currently seeing a spine doctor (Dr. Sousa at  [facility]) who is considering surgery.  She is also seeing an orthopedic doctor (Dr. Ferrera at [facility]) and a physical medicine and rehab doctor (Dr. Shabazz @ [facility]) .  She is fond of these doctors and thinks they are doing a good job.      I have been primary only managing medications for her since meeting her.  We tried Lyrica which  was helpful for her pain but caused swelling in her legs.  She is no longer taking any medications prescribed by myself.     If she wanted to re-trial lyrica in the future, once her swelling has been figured out, she could ask any of these physicians to prescribe this again.     In order to consolidate her care and allow her to have less appointments I am discharging her from my care but did let her know that she could follow up with me as needed in the future. This was discussed today.      She should continue to use her topical medications which are somewhat helpful for her. These include Salonpas patches and icy hot.  She is also using a heating pad.         MEDICATIONS:   No orders of the defined types were placed in this encounter.      FOLLOW UP: PRN        BILLING TIME DOCUMENTATION:   The total TIME spent on this patient on the date of the encounter/appointment was 57 minutes.      TOTAL TIME includes:   Time spent preparing to see the patient (reviewing records and tests) - 6 min  Time spent face to face (or over the phone) with the patient - 39 min  Time spent ordering tests, medications, procedures and referrals - 0 min  Time spent Referring and communicating with other healthcare professionals - 0 min  Time spent documenting clinical information in Epic - 12 min      ANDREA ADDISON MD   Pain Management          Answers for HPI/ROS submitted by the patient on 5/22/2023  JAYLEN 7 TOTAL SCORE: 1

## 2023-05-24 NOTE — NURSING NOTE
9/1/2022    11:00 AM 12/1/2022    12:04 PM 5/24/2023     1:42 PM   PEG Score   PEG Total Score 4.33 7 7.67         Meenu Costa MA  [facility] [place]

## 2023-05-25 NOTE — TELEPHONE ENCOUNTER
Central Prior Authorization Team  Phone: [phone]    Prior Authorization Approval    Medication: GEMTESA 75 MG PO TABS  Authorization Effective Date: 5/24/2023  Authorization Expiration Date: 5/23/2025  Approved Dose/Quantity:   Reference #:     Insurance Company:    Expected CoPay:       CoPay Card Available:      Financial Assistance Needed:   Which Pharmacy is filling the prescription: [place], [place] - [address] [facility]  Pharmacy Notified: Yes  Patient Notified:

## 2023-05-26 ENCOUNTER — OFFICE VISIT (OUTPATIENT)
Dept: FAMILY MEDICINE | Facility: CLINIC | Age: 78
End: 2023-05-26
Payer: MEDICARE

## 2023-05-26 ENCOUNTER — ANCILLARY PROCEDURE (OUTPATIENT)
Dept: GENERAL RADIOLOGY | Facility: CLINIC | Age: 78
End: 2023-05-26
Attending: PHYSICIAN ASSISTANT
Payer: MEDICARE

## 2023-05-26 VITALS
HEIGHT: 60 IN | SYSTOLIC BLOOD PRESSURE: 156 MMHG | RESPIRATION RATE: 20 BRPM | DIASTOLIC BLOOD PRESSURE: 62 MMHG | HEART RATE: 70 BPM | OXYGEN SATURATION: 99 % | TEMPERATURE: 97.2 F | WEIGHT: 212 LBS | BODY MASS INDEX: 41.62 KG/M2

## 2023-05-26 DIAGNOSIS — R10.9 ABDOMINAL CRAMPING: ICD-10-CM

## 2023-05-26 DIAGNOSIS — R07.89 CHEST WALL TENDERNESS: Primary | ICD-10-CM

## 2023-05-26 DIAGNOSIS — E11.21 DIABETIC NEPHROPATHY ASSOCIATED WITH TYPE 2 DIABETES MELLITUS (H): ICD-10-CM

## 2023-05-26 DIAGNOSIS — R07.89 CHEST WALL TENDERNESS: ICD-10-CM

## 2023-05-26 PROCEDURE — 99213 OFFICE O/P EST LOW 20 MIN: CPT | Performed by: PHYSICIAN ASSISTANT

## 2023-05-26 PROCEDURE — 71046 X-RAY EXAM CHEST 2 VIEWS: CPT | Mod: TC | Performed by: RADIOLOGY

## 2023-05-26 RX ORDER — ONDANSETRON 4 MG/1
4 TABLET, FILM COATED ORAL EVERY 6 HOURS PRN
Qty: 20 TABLET | Refills: 0 | Status: SHIPPED | OUTPATIENT
Start: 2023-05-26

## 2023-05-26 ASSESSMENT — PAIN SCALES - GENERAL: PAINLEVEL: SEVERE PAIN (7)

## 2023-05-26 NOTE — RESULT ENCOUNTER NOTE
Gerard Cochran,    Your chest x-ray was normal.    Please let us know if you have any questions or concerns.    Regards,  Maude Ortiz PA-C

## 2023-05-26 NOTE — PATIENT INSTRUCTIONS
Chest x-ray today     Take tylenol as needed for pain up to 1000mg three times daily, do not exceed 3000mg in 24 hour period    Continue to monitor blood pressure

## 2023-05-26 NOTE — PROGRESS NOTES
Assessment and Plan:     (R07.89) Chest wall tenderness  (primary encounter diagnosis)  Comment: comes and goes, CT non-contrast w/out acute findings, she is quite tender to the touch over an area that follows a dermatome but no rash, no trauma, no sob  Plan: XR Chest 2 Views  I discussed with Sammie that I am not sure what is causing the pain, it could be early zoster, she should call if she notices a rash  she has not had any trauma, she denies any shortness of breath, will obtain chest x-ray to rule out pathologic fracture, pneumothorax, etc, though less likely,  she can continue Tylenol as needed, she has some lidocaine patches at home, she sees her PCP in about 6 weeks, follow-up at that time, call if symptoms worsen prior to then    (E11.21) Diabetic nephropathy associated with type 2 diabetes mellitus (H)  Comment: she has chronic intermittent nausea  Plan: ondansetron (ZOFRAN) 4 MG tablet    (R10.9) Abdominal cramping  Comment: Intermittent for last few weeks, abdomen is benign, she does have an ostomy and will have a colonoscopy next week, she is also been in touch with her surgeon's office and has an appointment scheduled for the summer  Plan: Colonoscopy is planned, call if worsens    Maude Ortiz PA-C        Raheem Cochran is a 77 year old, presenting for the following health issues:  Follow Up and Abdominal Pain         View : No data to display.              KASANDRA Cochran is here for follow-up on her back pain    She saw me last week and subsequently had non-contrast CT that was negative for hydro, stones  Her urine was negative for infection  Her back pain is about the same  It comes and goes  It seems to worsen with more activity  It is tender to the touch  She hasn't had any trauma     For the last few weeks she has also had some intermittent diffuse lower abdominal cramping and bloating   She has noticed that certain foods tend to make symptoms worse  She has a colostomy, the stool in her  bag has been normal, no blood   She has a colonoscopy scheduled next week  She has been in touch with her surgeon's office, she is scheduled to see him in August, she is on a cancellation list     BP is high  She was recently prescribed chlorthalidone yesterday but hasn't started yet    Review of Systems   See above      Objective    Ht 1.524 m (5')   Wt 96.2 kg (212 lb)   BMI 41.40 kg/m    Body mass index is 41.4 kg/m .  Physical Exam     GENERAL: healthy, alert and no distress  RESP: lungs clear to auscultation - no rales, no rhonchi, no wheezes  CV: regular rates and rhythm, normal S1 S2, no S3 or S4 and no murmur, no click or rub  Left rib cage is tender w/palpation posteriorly and wraps around anteriorly, no rash, no induration or erythema   ABD: soft, NT, +BS   MS: extremities- no gross deformities noted, chronic lymphedema   SKIN: no suspicious lesions, no rashes

## 2023-05-31 ENCOUNTER — TELEPHONE (OUTPATIENT)
Dept: DERMATOLOGY | Facility: CLINIC | Age: 78
End: 2023-05-31
Payer: MEDICARE

## 2023-05-31 NOTE — TELEPHONE ENCOUNTER
Via phone patient appointment time was changed to 9:30 am for the following    Appointment type: Return HL  Provider:   Return date: 6-2-23 Time changed from 11:40 am to 9:30 am  Specialty phone number: [phone]    Additonal Notes: Time changed per . Patient was upset about changing her time. Email sent to Renetta GARCIA in Dermatology.

## 2023-06-02 ENCOUNTER — TELEPHONE (OUTPATIENT)
Dept: DERMATOLOGY | Facility: CLINIC | Age: 78
End: 2023-06-02

## 2023-06-02 ENCOUNTER — OFFICE VISIT (OUTPATIENT)
Dept: DERMATOLOGY | Facility: CLINIC | Age: 78
End: 2023-06-02
Payer: MEDICARE

## 2023-06-02 VITALS — HEART RATE: 65 BPM | SYSTOLIC BLOOD PRESSURE: 145 MMHG | DIASTOLIC BLOOD PRESSURE: 69 MMHG

## 2023-06-02 DIAGNOSIS — L23.2 ALLERGIC CONTACT DERMATITIS DUE TO COSMETICS: ICD-10-CM

## 2023-06-02 DIAGNOSIS — L64.9 ANDROGENIC ALOPECIA: Primary | ICD-10-CM

## 2023-06-02 DIAGNOSIS — L30.9 DERMATITIS: ICD-10-CM

## 2023-06-02 DIAGNOSIS — I87.2 VENOUS INSUFFICIENCY: ICD-10-CM

## 2023-06-02 PROCEDURE — 99214 OFFICE O/P EST MOD 30 MIN: CPT | Performed by: DERMATOLOGY

## 2023-06-02 ASSESSMENT — PAIN SCALES - GENERAL: PAINLEVEL: NO PAIN (0)

## 2023-06-02 NOTE — LETTER
"6/2/2023       RE: Chasity Hodgson  [id number]  [facility] [address]     Dear Colleague,    Thank you for referring your patient, Chasity Hodgson, to the [facility] [place] at [facility]. Please see a copy of my visit note below.    [facility] Dermatology Note      Dermatology Problem List:  1. Female pattern hair loss  - Current tx: laser comb up to 3x weekly (switching to band 8/2021), free and clear shampoo, fluocinolone acetonide 0.01% oil once per week  - Previously on 1/2 tab of minoxidil 2.5 mg daily, discontinued secondary to elevated creatinine 10/2020, has since stabilized  -Baseline Hair Metrix 8/16/2021   2. Tinea corporis or candida/yeast infection under the breast  - OTC clomitrazole  3. Rosacea - stable  - Vanicream  4.  Venous stasis dermatitis - currently using compression stockings and Eucerin.   5. Allergic contact dermatitis - stable  - Uses fragrance-free products  - Patch testing revealed mild reaction to Balsam of [place], fragrance mix, and a strong reaction to paraphenylenediamine.   6. Xerosis cutis  -Recommend continuing use of a gentle, fragrance-free emollient based moisturizer such as Vanicream or CeraVe.   7. Abrasion , right upper arm 02/15/2021.   - Monitor, photo taken today.   8. Milia, right lateral eye  - Removed 8/16/2021   - prior tx: tretinoin    Encounter Date: Jun 2, 2023    CC:  Chief Complaint   Patient presents with     Hair Loss     HL follow-up: Feels as though she is \"losing a lot\"  Uses oil and Vanicream shampoo and conditioner         History of Present Illness:  This 77 year old female presents as a follow-up for androgenic alopecia. The patient was last seen in 11/2022 and at that time a recommendation to switch from the light comb to the band was given. She shares that she didn't like the band very much and prefers the comb. She also admits that her current use of the " "light comb is very inconsistent and that she was informed by the company that her comb might not be effective after 3 years and she has had hers for about 10-15 years.     She has still noticed a slight worsening of her hair loss and would like to know more options of treatment. She has been experiencing a lot of stress in her life and is worried it is translating to her hair loss.     She also would like us to look at her lower legs. She wears compression stockings and has had surgeries on her feet in the past which requires additional bracing support.     Otherwise, she states that she is doing well and loves to participate in pain by numbers on her phone to help her with stress relief.     She would like us to look into a product called \"wow\" that was recommended to her to see if it would be an adequate option.     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)     Anemia due to blood loss, acute     ACP (advance care planning)     Postsurgical hypothyroidism     Type 2 diabetes, HbA1c goal < 7% (H)     Gastroparesis     Papillary carcinoma of thyroid (H)     Alopecia     Pulmonary nodule     Esophageal reflux     Dermatitis     Acne rosacea     Diabetic gastroparesis (H)     Poliomyelitis     Left knee pain     Carotid stenosis     Right shoulder pain     Type 2 diabetes mellitus with other specified complication (H)     Insufficiency, arterial, peripheral (H)     Allergic dermatitis due to other chemical product     Normocytic anemia     Morbid obesity with BMI of 40.0-44.9, adult (H)     Mild major depression (H)     CKD (chronic kidney disease) stage 3, GFR 30-59 ml/min (H)     Renal artery stenosis (H)     Neuropathic pain     " Mild major depression (H)     Venous insufficiency     Avascular necrosis of bone of hip, left (H)     Contact dermatitis     Diabetic nephropathy associated with type 2 diabetes mellitus (H)     DVT of upper extremity (deep vein thrombosis) (H)     Dysphagia     Edema of lower extremity     History of colonic polyps     History of pulmonary embolus (PE)     Iron deficiency anemia     Inguinal pain     Moderate protein-calorie malnutrition (H)     Osteoarthritis of left hip     Primary insomnia     Right lower quadrant pain     Status post total shoulder arthroplasty, left     Surgical aftercare, musculoskeletal system     Vitamin D deficiency     Lymphedema     Atypical chest pain     Past Medical History:   Diagnosis Date     Abdominal adhesions 1984, 96,99    s/p lysis     Abdominal adhesions      Acne rosacea      Allergic rhinitis      Allergic rhinitis, cause unspecified      Alopecia      Anemia      CAD (coronary artery disease)      Carotid stenosis      CKD (chronic kidney disease) stage 2, GFR 60-89 ml/min      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)      CPAP (continuous positive airway pressure) dependence      Depression      Diabetic gastroparesis (H)      Diet-controlled type 2 diabetes mellitus (H)      DM2 (diabetes mellitus, type 2) (H)      DVT (deep venous thrombosis) (H)      DVT of axillary vein, acute right (H)      Fibromyalgia      Gastro-oesophageal reflux disease      GERD (gastroesophageal reflux disease)      Hernia of unspecified site of abdominal cavity without mention of obstruction or gangrene     bilateral     Hernia, abdominal      History of blood transfusion     10/ 1980     History of thrombophlebitis      HTN (hypertension)      HTN (hypertension)      Hypertriglyceridemia      Hypertriglyceridemia      Hypokalemia      Hypothyroidism      Mumps      Obstructive sleep apnea      ANNETTE (obstructive sleep apnea)      ANNETTE on CPAP      Osteoarthritis of glenohumeral joint      Papillary carcinoma of  thyroid (H)     s/p thyroidectomy - Ruegemer     Papillary carcinoma of thyroid (H)      PE (pulmonary embolism)      Poliomyelitis     poor circulation right leg     Poliomyelitis      Postsurgical hypothyroidism     s/p papillary thryoid cancer - Ruegemer     Pulmonary embolism (H)      Pulmonary embolus (H)      Pulmonary nodule      Rosacea      S/P carpal tunnel release     bilateral     S/P hardware removal 01/2014    still with lingering foot pain     S/P shoulder surgery     bilateral     Septic joint (H)     right knee     Septic joint of right knee joint (H)      Venous insufficiency      Venous insufficiency      Venous thrombosis 1999    right axillary vein     Past Surgical History:   Procedure Laterality Date     AMPUTATE TOE(S)  03/15/2012    Procedure:AMPUTATE TOE(S); Surgeon:RUBEN MOSES; Location: OR     AMPUTATE TOE(S)       APPENDECTOMY  1972     APPENDECTOMY       ARTHRODESIS FOOT  03/15/2012    Procedure:ARTHRODESIS FOOT; RIGHT TRIPLE ARTHRODESIS, FIFTH TOE AMPUTATION, LATERAL LIGAMENT RECONSTRUCTION, TENDON TRANSFER AND RELEASE [MINI C-ARM, ARTHREX 4.5 AND 6.7 STAPLES, BIOCOMPOSITE TENODESIS SCREWS]; Surgeon:RUBEN MOSES; Location: OR     ARTHRODESIS FOOT Right     Right foot triple arthrodesis and removal of hardware     ARTHROSCOPY SHOULDER  06/25/2015    REVISION SUBACROMIAL DECOMPRESSION, EXCISION OF GANGLION CYST, DEBRIDEMENT AND EXCISION OF THE GLENOHUMERAL JOINT GANGLION CYST, CORACOID DECOMPRESSION, POSSIBLE SUBSCAPULARIS REPAIR AND OPEN SUBSCAPULARIS BICEP     ARTHROSCOPY SHOULDER ROTATOR CUFF REPAIR       BIOPSY  Thyroid 2002     BLADDER SURGERY       BREAST SURGERY  Biopsy     CHOLECYSTECTOMY       CHOLECYSTECTOMY       COLONOSCOPY  2018     COLOSTOMY  02/07/2012    Procedure:COLOSTOMY; CREATION OF SIGMOID COLOSTOMY AND EXTENSIVE  LYSIS OF ADHESIONS; Surgeon:MONTSERRAT BENDER; Location: OR     COLOSTOMY       CYSTOSCOPY       EYE SURGERY        GENITOURINARY SURGERY  1999     GI SURGERY      weakened rectal sphincter with artificial stimulator     HERNIA REPAIR  1976     HYSTERECTOMY TOTAL ABDOMINAL       LAPAROTOMY, LYSIS ADHESIONS, COMBINED  02/07/2012    Procedure:COMBINED LAPAROTOMY, LYSIS ADHESIONS; Surgeon:MONTSERRAT BENDER; Location:[facility] OR     RELEASE CARPAL TUNNEL       RELEASE TENDON FOOT  03/15/2012    Procedure:RELEASE TENDON FOOT; Surgeon:SUKHDEEP METZ; Location:[facility] OR     REMOVE HARDWARE FOOT  12/13/2012    Procedure: REMOVE HARDWARE FOOT;  RIGHT FOOT REMOVAL OF HARDWARE;  Surgeon: Sukhdeep Metz MD;  Location: [facility] OR     SHOULDER SURGERY  11/12/2020    LEFT SHOULDER HEMIARHTROPLASTY, BICEP TENODESIS     SOFT TISSUE SURGERY  2018, 2020     TENDON RELEASE      foot     THYROIDECTOMY       ZZC FREEING BOWEL ADHESION,ENTEROLYSIS      1986, 1996, 1999     ZZC NONSPECIFIC PROCEDURE      throidectomy     ZZC TOTAL ABDOM HYSTERECTOMY  1980    + BSO         Medications:  Current Outpatient Medications   Medication Sig Dispense Refill     acetaminophen (TYLENOL) 500 MG tablet Take 1,000 mg by mouth       albuterol (PROAIR HFA/PROVENTIL HFA/VENTOLIN HFA) 108 (90 Base) MCG/ACT inhaler albuterol sulfate HFA 90 mcg/actuation aerosol inhaler       amoxicillin (AMOXIL) 500 MG capsule        aspirin (ASA) 81 MG EC tablet Take 81 mg by mouth daily       azelastine (ASTEPRO) 0.15 % nasal spray 1 spray       carvedilol (COREG) 12.5 MG tablet Take 1 tablet (12.5 mg) by mouth 2 times daily (with meals) 180 tablet 3     chlorthalidone (HYGROTON) 25 MG tablet Take 0.5 tablets (12.5 mg) by mouth daily 15 tablet 0     clotrimazole (LOTRIMIN) 1 % cream Apply topically daily       Continuous Blood Gluc Sensor (FREESTYLE BRANDY 3 SENSOR) MISC 1 each every 14 days 6 each 3     diphenhydrAMINE-acetaminophen (TYLENOL PM)  MG tablet Take 1 tablet by mouth nightly as needed for sleep Reported on 3/20/2017       ezetimibe (ZETIA) 10 MG tablet Take 1  tablet (10 mg) by mouth daily 90 tablet 3     famotidine (PEPCID) 20 MG tablet Take 2 tablets (40 mg) by mouth as needed 180 tablet 3     fenofibrate (TRICOR) 145 MG tablet Take 1 tablet (145 mg) by mouth daily 90 tablet 3     ferrous sulfate (IRON) 325 (65 FE) MG tablet Take 325 mg by mouth daily (with breakfast)       fexofenadine (ALLEGRA) 180 MG tablet Take 180 mg by mouth daily. 120 0     fluocinolone acetonide (DERMA SMOOTHE/FS BODY) 0.01 % external oil Apply 2 mL to scalp once per week. Massage into scalp. Can be left in overnight or washed out after 4-6 hours. 118.28 mL 5     fluticasone (FLONASE) 50 MCG/ACT spray Spray 2 sprays in nostril daily 2 sprays in each nostril qd 1 Bottle 0     furosemide (LASIX) 20 MG tablet Take 1 tablet (20 mg) by mouth daily as needed (lower extremity edema) 90 tablet 3     glipiZIDE (GLUCOTROL) 5 MG tablet Take by mouth every 12 hours       Icosapent Ethyl 1 g CAPS Take 1 g by mouth 2 times daily 60 capsule 11     insulin degludec (TRESIBA FLEXTOUCH) 100 UNIT/ML pen Inject 25 Units Subcutaneous every morning. 15 mL 3     insulin pen needle (B-D U/F) 31G X 5 MM miscellaneous Use 1 pen needles daily or as directed. 90 each 3     lisinopril (ZESTRIL) 40 MG tablet Take 1 tablet (40 mg) by mouth daily 90 tablet 3     MULTIPLE VITAMIN PO Take 1 tablet by mouth       nitroGLYcerin (NITROSTAT) 0.4 MG sublingual tablet Place 1 tablet (0.4 mg) under the tongue every 5 minutes as needed for chest pain (no more than 3 in one hour; after 3rd, call 911.) 25 tablet 3     nystatin (MYCOSTATIN) cream Apply topically daily as needed        nystatin-triamcinolone (MYCOLOG II) cream Apply topically daily as needed        ondansetron (ZOFRAN) 4 MG tablet Take 1 tablet (4 mg) by mouth every 6 hours as needed Reported on 3/20/2017 20 tablet 0     order for DME Equipment being ordered: Compression stockings - Knee High; 20-30 mmHg compression - note would like adhesive band to keep the stocking from  sliding down 3 each 0     pantoprazole (PROTONIX) 40 MG EC tablet Take 40 mg by mouth 2 times daily       polyethylene glycol 400 (BLINK TEARS) 0.25 % SOLN ophthalmic solution        sitagliptin (JANUVIA) 50 MG tablet Take 1 tablet (50 mg) by mouth daily 90 tablet 3     sucralfate (CARAFATE) 1 GM/10ML suspension Take 1 g by mouth 4 times daily       SYNTHROID 112 MCG tablet TAKE ONE-HALF TABLET BY MOUTH DAILY ON FRIDAYS AND TAKE ONE TABLET BY MOUTH ALL OTHER DAYS AS DIRECTED 90 tablet 3     tolterodine ER (DETROL LA) 4 MG 24 hr capsule Take 1 capsule (4 mg) by mouth daily 30 capsule 11     tretinoin (RETIN-A) 0.025 % external cream Use every night as tolerated - spot treat lesion 20 g 3     Vitamin D3 50 mcg (2000 units) tablet 1 tablet daily       vibegron (GEMTESA) 75 MG TABS tablet Take 1 tablet (75 mg) by mouth daily 30 tablet 3     Allergies   Allergen Reactions     Ketorolac Tromethamine Difficulty breathing     Shortness of breath     Nsaids Difficulty breathing     Increased creatinine     Celecoxib Itching and Rash     Morphine And Related Itching     With higher doses     Crestor [Rosuvastatin] Muscle Pain (Myalgia)     No Clinical Screening - See Comments Itching     Fragrance     Vioxx Other (See Comments)     Heart races     Conjugated Estrogens Rash     Sulfa Antibiotics Rash         Review of Systems:  -As per HPI      Physical exam:  BP (!) 145/69 (BP Location: Right arm, Patient Position: Sitting, Cuff Size: Adult Large)   Pulse 65   -This is a well developed, well-nourished female in no acute distress, in a pleasant mood, and some decrease in mobility.    -Focused examination of the scalp and lower extremities were performed.  -Scalp demonstrates a widened part, increase vellus hair changes compared to last photos taken, and some very mild scale  -Lower extremities: Circumferential brown hyperpigmentation bilaterally consistent with hemosiderin deposition. The skin is well hydrated, but on the  left lower leg there is a erythematous patch that blanches with pressure. There is no pain with palpation, weeping, or significant pitting edema. There are also scattered hyperpigmented macules on posterior calf. The right lower extremity has an area of white scaly grouped papules on the posterior achilles.   -No other lesions of concern on areas examined.     Impression/Plan:  Androgenetic alopecia:  Condition is worsening with increased vellus hair changes. Because the temporal hair was not pulled back, hair recession changes could not be accurately accessed. Recent TSH was normal. Her last ferritin was checked in 2021. Her last Cr was 1.18 and is relatively stable, however, she is hypertensive at 145/69. It is possible her worsening symptoms are a combination oral minoxidil therapy discontinuation and decreased consistency with light therapy.   Discussed with patient that she should contact her PCP for permission to be placed on 0.625 mg minoxidil therapy which would likely not impact her BP or kidney function.   Continued light treatment with the comb was encouraged and patient was reassured that the comb still was effective.  Continue weekly fluocinolone oil treatment.  Consider ferritin and iron evaluation at next visit, however, records show that she does take iron supplementation.  Topical minoxidil therapy is likely contraindicated given her history of allergic dermatitis.    Stasis Dermatitis:  Exam today shows areas of increased inflammation and some scaly papules likely secondary to fluid retention. Physical exam gave no signs of cellulitis at this time. Of note she does have history of prior MRSA infections. Anti-inflammatory treatment will be given and the patient was counseled to continue using compression stockings and elevate her legs when able.     Triamcinolone 1% ointment prescribed to be used every other day.   Continue compression stockings  Photos were taken in office for continued  monitoring  Follow up in 4 months for repeat evaluation       Follow-up in 4 months, earlier for new or changing lesions.     Staff Involved:  Scribed by Roseline Gilliam, MS4 for Dr. Meyer.      Staff Physician:  I was present with the medical student who participated in the service and in the documentation of the note. I have verified the history and personally performed the physical exam and medical decision making. I agree with the assessment and plan of care as documented in the note.           Renetta Meyer MD  Professor and Chair  Department of Dermatology  [facility]: Phone: [phone], Fax:[phone]

## 2023-06-02 NOTE — PROGRESS NOTES
"[facility] Dermatology Note      Dermatology Problem List:  1. Female pattern hair loss  - Current tx: laser comb up to 3x weekly (switching to band 8/2021), free and clear shampoo, fluocinolone acetonide 0.01% oil once per week  - Previously on 1/2 tab of minoxidil 2.5 mg daily, discontinued secondary to elevated creatinine 10/2020, has since stabilized  -Baseline Hair Metrix 8/16/2021   2. Tinea corporis or candida/yeast infection under the breast  - OTC clomitrazole  3. Rosacea - stable  - Vanicream  4.  Venous stasis dermatitis - currently using compression stockings and Eucerin.   5. Allergic contact dermatitis - stable  - Uses fragrance-free products  - Patch testing revealed mild reaction to [place]als[place] of [place], fragrance mix, and a strong reaction to paraphenylenediamine.   6. Xerosis cutis  -Recommend continuing use of a gentle, fragrance-free emollient based moisturizer such as Vanicream or CeraVe.   7. Abrasion , right upper arm 02/15/2021.   - Monitor, photo taken today.   8. Milia, right lateral eye  - Removed 8/16/2021   - prior tx: tretinoin    Encounter Date: Jun 2, 2023    CC:  Chief Complaint   Patient presents with    Hair Loss     HL follow-up: Feels as though she is \"losing a lot\"  Uses oil and Vanicream shampoo and conditioner         History of Present Illness:  This 77 year old female presents as a follow-up for androgenic alopecia. The patient was last seen in 11/2022 and at that time a recommendation to switch from the light comb to the band was given. She shares that she didn't like the band very much and prefers the comb. She also admits that her current use of the light comb is very inconsistent and that she was informed by the company that her comb might not be effective after 3 years and she has had hers for about 10-15 years.     She has still noticed a slight worsening of her hair loss and would like to know more options of treatment. She has been experiencing a lot of " "stress in her life and is worried it is translating to her hair loss.     She also would like us to look at her lower legs. She wears compression stockings and has had surgeries on her feet in the past which requires additional bracing support.     Otherwise, she states that she is doing well and loves to participate in pain by numbers on her phone to help her with stress relief.     She would like us to look into a product called \"wow\" that was recommended to her to see if it would be an adequate option.     Past Medical History:   Patient Active Problem List   Diagnosis    Low back pain    Mixed hyperlipidemia    Benign essential hypertension    Fibromyalgia    Allergic rhinitis    ANNETTE (obstructive sleep apnea)    Cavovarus deformity of foot    History of DVT (deep vein thrombosis)    Hypokalemia    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)    Anemia due to blood loss, acute    ACP (advance care planning)    Postsurgical hypothyroidism    Type 2 diabetes, HbA1c goal < 7% (H)    Gastroparesis    Papillary carcinoma of thyroid (H)    Alopecia    Pulmonary nodule    Esophageal reflux    Dermatitis    Acne rosacea    Diabetic gastroparesis (H)    Poliomyelitis    Left knee pain    Carotid stenosis    Right shoulder pain    Type 2 diabetes mellitus with other specified complication (H)    Insufficiency, arterial, peripheral (H)    Allergic dermatitis due to other chemical product    Normocytic anemia    Morbid obesity with BMI of 40.0-44.9, adult (H)    Mild major depression (H)    CKD (chronic kidney disease) stage 3, GFR 30-59 ml/min (H)    Renal artery stenosis (H)    Neuropathic pain    Mild major depression (H)    Venous insufficiency    Avascular necrosis of bone of hip, left (H)    Contact dermatitis    Diabetic nephropathy associated with type 2 diabetes mellitus (H)    DVT of upper extremity (deep vein thrombosis) (H)    Dysphagia    Edema of lower extremity    History of colonic polyps    History of pulmonary embolus (PE)    " Iron deficiency anemia    Inguinal pain    Moderate protein-calorie malnutrition (H)    Osteoarthritis of left hip    Primary insomnia    Right lower quadrant pain    Status post total shoulder arthroplasty, left    Surgical aftercare, musculoskeletal system    Vitamin D deficiency    Lymphedema    Atypical chest pain     Past Medical History:   Diagnosis Date    Abdominal adhesions 1984, 96,99    s/p lysis    Abdominal adhesions     Acne rosacea     Allergic rhinitis     Allergic rhinitis, cause unspecified     Alopecia     Anemia     CAD (coronary artery disease)     Carotid stenosis     CKD (chronic kidney disease) stage 2, GFR 60-89 ml/min     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)     CPAP (continuous positive airway pressure) dependence     Depression     Diabetic gastroparesis (H)     Diet-controlled type 2 diabetes mellitus (H)     DM2 (diabetes mellitus, type 2) (H)     DVT (deep venous thrombosis) (H)     DVT of axillary vein, acute right (H)     Fibromyalgia     Gastro-oesophageal reflux disease     GERD (gastroesophageal reflux disease)     Hernia of unspecified site of abdominal cavity without mention of obstruction or gangrene     bilateral    Hernia, abdominal     History of blood transfusion     10/ 1980    History of thrombophlebitis     HTN (hypertension)     HTN (hypertension)     Hypertriglyceridemia     Hypertriglyceridemia     Hypokalemia     Hypothyroidism     Mumps     Obstructive sleep apnea     ANNETTE (obstructive sleep apnea)     ANNETTE on CPAP     Osteoarthritis of glenohumeral joint     Papillary carcinoma of thyroid (H)     s/p thyroidectomy - Ruegemer    Papillary carcinoma of thyroid (H)     PE (pulmonary embolism)     Poliomyelitis     poor circulation right leg    Poliomyelitis     Postsurgical hypothyroidism     s/p papillary thryoid cancer - Ruegemer    Pulmonary embolism (H)     Pulmonary embolus (H)     Pulmonary nodule     Rosacea     S/P carpal tunnel release     bilateral    S/P hardware removal  01/2014    still with lingering foot pain    S/P shoulder surgery     bilateral    Septic joint (H)     right knee    Septic joint of right knee joint (H)     Venous insufficiency     Venous insufficiency     Venous thrombosis 1999    right axillary vein     Past Surgical History:   Procedure Laterality Date    AMPUTATE TOE(S)  03/15/2012    Procedure:AMPUTATE TOE(S); Surgeon:RUBEN MOSES; Location: OR    AMPUTATE TOE(S)      APPENDECTOMY  1972    APPENDECTOMY      ARTHRODESIS FOOT  03/15/2012    Procedure:ARTHRODESIS FOOT; RIGHT TRIPLE ARTHRODESIS, FIFTH TOE AMPUTATION, LATERAL LIGAMENT RECONSTRUCTION, TENDON TRANSFER AND RELEASE [MINI C-ARM, ARTHREX 4.5 AND 6.7 STAPLES, BIOCOMPOSITE TENODESIS SCREWS]; Surgeon:RUBEN MOSES; Location: OR    ARTHRODESIS FOOT Right     Right foot triple arthrodesis and removal of hardware    ARTHROSCOPY SHOULDER  06/25/2015    REVISION SUBACROMIAL DECOMPRESSION, EXCISION OF GANGLION CYST, DEBRIDEMENT AND EXCISION OF THE GLENOHUMERAL JOINT GANGLION CYST, CORACOID DECOMPRESSION, POSSIBLE SUBSCAPULARIS REPAIR AND OPEN SUBSCAPULARIS BICEP    ARTHROSCOPY SHOULDER ROTATOR CUFF REPAIR      BIOPSY  Thyroid 2002    BLADDER SURGERY      BREAST SURGERY  Biopsy    CHOLECYSTECTOMY      CHOLECYSTECTOMY      COLONOSCOPY  2018    COLOSTOMY  02/07/2012    Procedure:COLOSTOMY; CREATION OF SIGMOID COLOSTOMY AND EXTENSIVE  LYSIS OF ADHESIONS; Surgeon:MONTSERRAT BENDER P; Location: OR    COLOSTOMY      CYSTOSCOPY      EYE SURGERY      GENITOURINARY SURGERY  1999    GI SURGERY      weakened rectal sphincter with artificial stimulator    HERNIA REPAIR  1976    HYSTERECTOMY TOTAL ABDOMINAL      LAPAROTOMY, LYSIS ADHESIONS, COMBINED  02/07/2012    Procedure:COMBINED LAPAROTOMY, LYSIS ADHESIONS; Surgeon:MONTSERRAT BENDER P; Location: OR    RELEASE CARPAL TUNNEL      RELEASE TENDON FOOT  03/15/2012    Procedure:RELEASE TENDON FOOT; Surgeon:RUBEN MOSES; Location: OR     REMOVE HARDWARE FOOT  12/13/2012    Procedure: REMOVE HARDWARE FOOT;  RIGHT FOOT REMOVAL OF HARDWARE;  Surgeon: Sukhdeep Metz MD;  Location: [facility] OR    SHOULDER SURGERY  11/12/2020    LEFT SHOULDER HEMIARHTROPLASTY, BICEP TENODESIS    SOFT TISSUE SURGERY  2018, 2020    TENDON RELEASE      foot    THYROIDECTOMY      ZZC FREEING BOWEL ADHESION,ENTEROLYSIS      1986, 1996, 1999    ZZC NONSPECIFIC PROCEDURE      throidectomy    ZZC TOTAL ABDOM HYSTERECTOMY  1980    + BSO         Medications:  Current Outpatient Medications   Medication Sig Dispense Refill    acetaminophen (TYLENOL) 500 MG tablet Take 1,000 mg by mouth      albuterol (PROAIR HFA/PROVENTIL HFA/VENTOLIN HFA) 108 (90 Base) MCG/ACT inhaler albuterol sulfate HFA 90 mcg/actuation aerosol inhaler      amoxicillin (AMOXIL) 500 MG capsule       aspirin (ASA) 81 MG EC tablet Take 81 mg by mouth daily      azelastine (ASTEPRO) 0.15 % nasal spray 1 spray      carvedilol (COREG) 12.5 MG tablet Take 1 tablet (12.5 mg) by mouth 2 times daily (with meals) 180 tablet 3    chlorthalidone (HYGROTON) 25 MG tablet Take 0.5 tablets (12.5 mg) by mouth daily 15 tablet 0    clotrimazole (LOTRIMIN) 1 % cream Apply topically daily      Continuous Blood Gluc Sensor (FREESTYLE BRANDY 3 SENSOR) MISC 1 each every 14 days 6 each 3    diphenhydrAMINE-acetaminophen (TYLENOL PM)  MG tablet Take 1 tablet by mouth nightly as needed for sleep Reported on 3/20/2017      ezetimibe (ZETIA) 10 MG tablet Take 1 tablet (10 mg) by mouth daily 90 tablet 3    famotidine (PEPCID) 20 MG tablet Take 2 tablets (40 mg) by mouth as needed 180 tablet 3    fenofibrate (TRICOR) 145 MG tablet Take 1 tablet (145 mg) by mouth daily 90 tablet 3    ferrous sulfate (IRON) 325 (65 FE) MG tablet Take 325 mg by mouth daily (with breakfast)      fexofenadine (ALLEGRA) 180 MG tablet Take 180 mg by mouth daily. 120 0    fluocinolone acetonide (DERMA SMOOTHE/FS BODY) 0.01 % external oil Apply 2 mL to  scalp once per week. Massage into scalp. Can be left in overnight or washed out after 4-6 hours. 118.28 mL 5    fluticasone (FLONASE) 50 MCG/ACT spray Spray 2 sprays in nostril daily 2 sprays in each nostril qd 1 Bottle 0    furosemide (LASIX) 20 MG tablet Take 1 tablet (20 mg) by mouth daily as needed (lower extremity edema) 90 tablet 3    glipiZIDE (GLUCOTROL) 5 MG tablet Take by mouth every 12 hours      Icosapent Ethyl 1 g CAPS Take 1 g by mouth 2 times daily 60 capsule 11    insulin degludec (TRESIBA FLEXTOUCH) 100 UNIT/ML pen Inject 25 Units Subcutaneous every morning. 15 mL 3    insulin pen needle (B-D U/F) 31G X 5 MM miscellaneous Use 1 pen needles daily or as directed. 90 each 3    lisinopril (ZESTRIL) 40 MG tablet Take 1 tablet (40 mg) by mouth daily 90 tablet 3    MULTIPLE VITAMIN PO Take 1 tablet by mouth      nitroGLYcerin (NITROSTAT) 0.4 MG sublingual tablet Place 1 tablet (0.4 mg) under the tongue every 5 minutes as needed for chest pain (no more than 3 in one hour; after 3rd, call 911.) 25 tablet 3    nystatin (MYCOSTATIN) cream Apply topically daily as needed       nystatin-triamcinolone (MYCOLOG II) cream Apply topically daily as needed       ondansetron (ZOFRAN) 4 MG tablet Take 1 tablet (4 mg) by mouth every 6 hours as needed Reported on 3/20/2017 20 tablet 0    order for DME Equipment being ordered: Compression stockings - Knee High; 20-30 mmHg compression - note would like adhesive band to keep the stocking from sliding down 3 each 0    pantoprazole (PROTONIX) 40 MG EC tablet Take 40 mg by mouth 2 times daily      polyethylene glycol 400 (BLINK TEARS) 0.25 % SOLN ophthalmic solution       sitagliptin (JANUVIA) 50 MG tablet Take 1 tablet (50 mg) by mouth daily 90 tablet 3    sucralfate (CARAFATE) 1 GM/10ML suspension Take 1 g by mouth 4 times daily      SYNTHROID 112 MCG tablet TAKE ONE-HALF TABLET BY MOUTH DAILY ON FRIDAYS AND TAKE ONE TABLET BY MOUTH ALL OTHER DAYS AS DIRECTED 90 tablet 3     tolterodine ER (DETROL LA) 4 MG 24 hr capsule Take 1 capsule (4 mg) by mouth daily 30 capsule 11    tretinoin (RETIN-A) 0.025 % external cream Use every night as tolerated - spot treat lesion 20 g 3    Vitamin D3 50 mcg (2000 units) tablet 1 tablet daily      vibegron (GEMTESA) 75 MG TABS tablet Take 1 tablet (75 mg) by mouth daily 30 tablet 3     Allergies   Allergen Reactions    Ketorolac Tromethamine Difficulty breathing     Shortness of breath    Nsaids Difficulty breathing     Increased creatinine    Celecoxib Itching and Rash    Morphine And Related Itching     With higher doses    Crestor [Rosuvastatin] Muscle Pain (Myalgia)    No Clinical Screening - See Comments Itching     Fragrance    Vioxx Other (See Comments)     Heart races    Conjugated Estrogens Rash    Sulfa Antibiotics Rash         Review of Systems:  -As per HPI      Physical exam:  BP (!) 145/69 (BP Location: Right arm, Patient Position: Sitting, Cuff Size: Adult Large)   Pulse 65   -This is a well developed, well-nourished female in no acute distress, in a pleasant mood, and some decrease in mobility.    -Focused examination of the scalp and lower extremities were performed.  -Scalp demonstrates a widened part, increase vellus hair changes compared to last photos taken, and some very mild scale  -Lower extremities: Circumferential brown hyperpigmentation bilaterally consistent with hemosiderin deposition. The skin is well hydrated, but on the left lower leg there is a erythematous patch that blanches with pressure. There is no pain with palpation, weeping, or significant pitting edema. There are also scattered hyperpigmented macules on posterior calf. The right lower extremity has an area of white scaly grouped papules on the posterior achilles.   -No other lesions of concern on areas examined.     Impression/Plan:  Androgenetic alopecia:  Condition is worsening with increased vellus hair changes. Because the temporal hair was not pulled back,  hair recession changes could not be accurately accessed. Recent TSH was normal. Her last ferritin was checked in 2021. Her last Cr was 1.18 and is relatively stable, however, she is hypertensive at 145/69. It is possible her worsening symptoms are a combination oral minoxidil therapy discontinuation and decreased consistency with light therapy.   Discussed with patient that she should contact her PCP for permission to be placed on 0.625 mg minoxidil therapy which would likely not impact her BP or kidney function.   Continued light treatment with the comb was encouraged and patient was reassured that the comb still was effective.  Continue weekly fluocinolone oil treatment.  Consider ferritin and iron evaluation at next visit, however, records show that she does take iron supplementation.  Topical minoxidil therapy is likely contraindicated given her history of allergic dermatitis.    Stasis Dermatitis:  Exam today shows areas of increased inflammation and some scaly papules likely secondary to fluid retention. Physical exam gave no signs of cellulitis at this time. Of note she does have history of prior MRSA infections. Anti-inflammatory treatment will be given and the patient was counseled to continue using compression stockings and elevate her legs when able.     Triamcinolone 1% ointment prescribed to be used every other day.   Continue compression stockings  Photos were taken in office for continued monitoring  Follow up in 4 months for repeat evaluation       Follow-up in 4 months, earlier for new or changing lesions.     Staff Involved:  Scribed by Roseline Gilliam, MS4 for Dr. Meyer.      Staff Physician:  I was present with the medical student who participated in the service and in the documentation of the note. I have verified the history and personally performed the physical exam and medical decision making. I agree with the assessment and plan of care as documented in the note.           Renetta ALDRIDGE  MD Mitch  Professor and Chair  Department of Dermatology  [facility]: Phone: [phone], Fax:[phone]

## 2023-06-02 NOTE — TELEPHONE ENCOUNTER
[facility] Call Center    Phone Message    May a detailed message be left on voicemail: yes     Reason for Call: Other: Topical steroid was supposed to be prescribed. Pharmacy states they don't have it yet. Nothing on medication list.  Please send to the [facility] on [address] in [place], phone [phone].     Pt also needs a Follow-up in October. No templates available in October.     Please call Pt to schedule the Follow-up and to let her know the Rx has been sent.     Thank you.       Action Taken: Message routed to:  [facility] Derm    Travel Screening: Not Applicable

## 2023-06-02 NOTE — PATIENT INSTRUCTIONS
We will prescribe a topical steroid for you to use on your lower leg to help with the inflammation. Use this cream on alternate days between using Eucerin.      Check with your PCP about taking a quarter of a tab of minoxidil which is a small dose, but can affect your kidney function.

## 2023-06-02 NOTE — NURSING NOTE
"Dermatology Rooming Note    Chasity Hodgson's goals for this visit include:   Chief Complaint   Patient presents with     Hair Loss     HL follow-up: Feels as though she is \"losing a lot\"  Uses oil and Vanicream shampoo and conditioner     Jennifer Leos, JAMI    "

## 2023-06-05 ENCOUNTER — TELEPHONE (OUTPATIENT)
Dept: ENDOCRINOLOGY | Facility: CLINIC | Age: 78
End: 2023-06-05
Payer: MEDICARE

## 2023-06-05 NOTE — TELEPHONE ENCOUNTER
[facility] Call Center    Phone Message    May a detailed message be left on voicemail: yes     Reason for Call: Other: . Patient states she is having a colonoscopy tomorrow morning. Due to the liquid diet, she would like directions for her insulin dosage. Please review and advise asap. Thank you.    Action Taken: Other: Endo    Travel Screening: Not Applicable

## 2023-06-05 NOTE — TELEPHONE ENCOUNTER
Per provider: 2/3 original dose.     Pt takes Tresiba 25 units daily, so called pt and instructed to take 17 units tomorrow.  Pt verbalized understanding. Gina Alvarez RN

## 2023-06-06 ENCOUNTER — TRANSFERRED RECORDS (OUTPATIENT)
Dept: HEALTH INFORMATION MANAGEMENT | Facility: CLINIC | Age: 78
End: 2023-06-06
Payer: MEDICARE

## 2023-06-07 RX ORDER — TRIAMCINOLONE ACETONIDE 1 MG/G
OINTMENT TOPICAL EVERY OTHER DAY
Qty: 80 G | Refills: 3 | Status: SHIPPED | OUTPATIENT
Start: 2023-06-07 | End: 2024-08-18

## 2023-06-07 NOTE — TELEPHONE ENCOUNTER
[facility] Call Center    Phone Message    May a detailed message be left on voicemail: no     Reason for Call: Other: Sammie Trent calling for the Topical Steroid and for the appt in Sept or Oct with no template for Dr. Meyer.  Please call Sammie at: [phone]     Action Taken: Message routed to:  [facility] DERM    Travel Screening: Not Applicable

## 2023-06-07 NOTE — TELEPHONE ENCOUNTER
Return in about 3 months (around 9/2/2023).  We will prescribe a topical steroid for you to use on your lower leg to help with the inflammation. Use this cream on alternate days between using Eucerin.          Writer spoke with patient. Will send email for provider to address RX for leg.  Upcoming appointment scheduled for 9/22/23, patient was given appointment details and had no further questions/concerns at this time.    Jennifer Leos LPN

## 2023-06-09 ENCOUNTER — PRE VISIT (OUTPATIENT)
Dept: UROLOGY | Facility: CLINIC | Age: 78
End: 2023-06-09
Payer: MEDICARE

## 2023-06-14 ENCOUNTER — NURSE TRIAGE (OUTPATIENT)
Dept: FAMILY MEDICINE | Facility: CLINIC | Age: 78
End: 2023-06-14
Payer: MEDICARE

## 2023-06-14 ASSESSMENT — ENCOUNTER SYMPTOMS
HYPERTENSION: 1
JAUNDICE: 0
HOARSE VOICE: 1
ORTHOPNEA: 1
WEIGHT GAIN: 0
SWOLLEN GLANDS: 1
HEMOPTYSIS: 0
HEMATURIA: 0
NAIL CHANGES: 1
LEG PAIN: 1
SEIZURES: 0
SNORES LOUDLY: 1
NAUSEA: 1
WHEEZING: 0
EYE PAIN: 0
EYE REDNESS: 0
SORE THROAT: 1
BLOOD IN STOOL: 0
COUGH: 1
SKIN CHANGES: 0
MUSCLE CRAMPS: 1
RECTAL PAIN: 1
DYSURIA: 0
EXERCISE INTOLERANCE: 1
STIFFNESS: 0
TASTE DISTURBANCE: 0
NECK MASS: 1
SYNCOPE: 0
PARALYSIS: 0
BRUISES/BLEEDS EASILY: 0
NECK PAIN: 1
LOSS OF CONSCIOUSNESS: 0
POLYDIPSIA: 0
POSTURAL DYSPNEA: 1
COUGH DISTURBING SLEEP: 0
HALLUCINATIONS: 0
SINUS CONGESTION: 0
DOUBLE VISION: 1
FEVER: 0
BLOATING: 1
WEAKNESS: 1
DIARRHEA: 0
ALTERED TEMPERATURE REGULATION: 1
DIZZINESS: 1
MEMORY LOSS: 0
LIGHT-HEADEDNESS: 1
FLANK PAIN: 1
MYALGIAS: 1
ARTHRALGIAS: 1
BACK PAIN: 1
MUSCLE WEAKNESS: 0
SMELL DISTURBANCE: 0
ABDOMINAL PAIN: 1
FATIGUE: 1
DECREASED APPETITE: 0
HEARTBURN: 1
SINUS PAIN: 0
POLYPHAGIA: 0
SPUTUM PRODUCTION: 1
EYE WATERING: 0
WEIGHT LOSS: 0
SHORTNESS OF BREATH: 1
HEADACHES: 1
NUMBNESS: 0
POOR WOUND HEALING: 0
SLEEP DISTURBANCES DUE TO BREATHING: 0
EYE IRRITATION: 0
NIGHT SWEATS: 1
HYPOTENSION: 0
CHILLS: 0
TROUBLE SWALLOWING: 1
DYSPNEA ON EXERTION: 0
TINGLING: 0
PALPITATIONS: 0
CONSTIPATION: 0
DISTURBANCES IN COORDINATION: 0
DIFFICULTY URINATING: 0
VOMITING: 1
TREMORS: 0
SPEECH CHANGE: 0
INCREASED ENERGY: 0
BOWEL INCONTINENCE: 0
JOINT SWELLING: 0

## 2023-06-14 NOTE — TELEPHONE ENCOUNTER
Nurse Triage SBAR    Is this a 2nd Level Triage? YES, LICENSED PRACTITIONER REVIEW IS REQUIRED    Situation: Pt sent  looking for a CT/MRI to evaluate for headaches and  new onset cheek sx/year pain that feels like growth in past couple months going from size of dime to size of deo.     Background: Has seen endo in past for thyroid cancer, is concerned with growth near cheek, and also worsening headache.  Headaches have been getting worse over the past 1-2 months, can notice 2 to 7 times a day, and 2 recent episodes accompanied by bilateral blurry vision.  Last episode of blurry vision was 4 days ago for up to 10 min, vision blurry but denies any other symptoms/vision loss/etc.  Denies any one sided symptoms, chest pain, or other concerns.     Assessment: PT should be evaluated for worsening symptoms before CT/MRI    Protocol Recommended Disposition:   Go To ED/UCC Now (Or To Office With PCP Approval)    Recommendation: Disposition recommended due to blurred vision is ED/UCC, but pt not currently having blurred vision.  Could this be a possible ADS pt for tomorrow?   Pt takes metro mobility so needs 1 day notice for ride.     Routed to provider    Does the patient meet one of the following criteria for ADS visit consideration? 16+ years old, with an [facility] PCP     TIP  Providers, please consider if this condition is appropriate for management at one of our Acute and Diagnostic Services sites.     If patient is a good candidate, please use dotphrase <dot>triageresponse and select Refer to ADS to document.    Pt's original mychart.     Hi Dr Benoit,  Dr Renetta Meyer wanted me to check with you to see if it would be okay for me to take a quarter of a tab of Minoxidil to help treat my thinning hair.   Back on August 29, 2022 I had a Ultrasound Head Neck Soft Tissue ordered by Dr Odette Weston from Endocrinology and Dr Leonides Brito the Radiologist, had suggested doing a enhanced CT or MRI of the neck for  "further evaluation if there is continued clinical concern for a suspicious neck mass. I continue to have the swollen lymph node in my neck on the left side and often under my left arm. Over the last six to eight months my cheek on the left side is swollen and I have a small lump along the jaw line that has gotten somewhat bigger. Also the pain on that side goes across my cheek to behind my ear and many times I get an earache. I was wondering if I could get a CT scan or MRI to evaluate that area and see if there is anything going on.    Also I get bad headaches from two times a day up to six or eight times a day. Recently I have had on two different times where I experienced blurry vision for about ten minutes and then it goes away. I was wondering if I could also get a MRI of my head at the same time they would do the cheek, jaw and neck. Just to make sure there is nothing going on    1. LOCATION: \"Where does it hurt?\"       Different locations  2. ONSET: \"When did the headache start?\" (Minutes, hours or days)   1-2 months ago  3. PATTERN: \"Does the pain come and go, or has it been constant since it started?\"      More frequent over past month or two.   4. SEVERITY: \"How bad is the pain?\" and \"What does it keep you from doing?\"  (e.g., Scale 1-10; mild, moderate, or severe)    - MILD (1-3): doesn't interfere with normal activities     - MODERATE (4-7): interferes with normal activities or awakens from sleep     - SEVERE (8-10): excruciating pain, unable to do any normal activities         Sometimes a 7-8, sometimes more mild   6. CAUSE: \"What do you think is causing the headache?\"    Reason for Disposition    Loss of vision or double vision  (Exception: Same as prior migraines.)    Additional Information    Negative: Followed a head injury within last 3 days    Negative: Traumatic Brain Injury (TBI) is suspected    Negative: Sinus pain of forehead and yellow or green nasal discharge    Negative: Pregnant    " Negative: Difficult to awaken or acting confused (e.g., disoriented, slurred speech)    Negative: Weakness of the face, arm or leg on one side of the body and new-onset    Negative: Numbness of the face, arm or leg on one side of the body and new-onset    Negative: Loss of speech or garbled speech and new-onset    Negative: Passed out (i.e., fainted, collapsed and was not responding)    Negative: Sounds like a life-threatening emergency to the triager    Negative: Unable to walk without falling    Negative: Stiff neck (can't touch chin to chest)    Negative: Possibility of carbon monoxide exposure    Negative: Fever > 103 F (39.4 C)    Negative: Fever > 100.0 F (37.8 C) and has diabetes mellitus or a weak immune system (e.g., HIV positive, cancer chemotherapy, organ transplant, splenectomy, chronic steroids)    Negative: New headache and weak immune system (e.g., HIV positive, cancer chemo, splenectomy, organ transplant, chronic steroids)    Negative: Fever present > 3 days (72 hours)    Negative: Patient wants to be seen    Negative: SEVERE headache (e.g., excruciating) and has had severe headaches before    Negative: SEVERE headache and not relieved by pain meds    Negative: SEVERE headache and vomiting    Negative: SEVERE headache and fever    Negative: Headache started during exertion (e.g., sex, strenuous exercise, heavy lifting)    Negative: Unexplained headache that is present > 24 hours    Negative: New headache and age > 50    Negative: Severe pain in one eye    Negative: SEVERE headache, sudden-onset (i.e., reaching maximum intensity within seconds to 1 hour)    Negative: SEVERE headache, states 'worst headache' of life    Protocols used: HEADACHE-A-OH

## 2023-06-14 NOTE — TELEPHONE ENCOUNTER
Yes, I agree with plan to schedule with team     I spoke with Sammie and she is available for in office visit this week if available    Shola Benoit MD, MD

## 2023-06-19 ENCOUNTER — OFFICE VISIT (OUTPATIENT)
Dept: FAMILY MEDICINE | Facility: CLINIC | Age: 78
End: 2023-06-19
Payer: MEDICARE

## 2023-06-19 VITALS
HEART RATE: 74 BPM | BODY MASS INDEX: 40.91 KG/M2 | SYSTOLIC BLOOD PRESSURE: 138 MMHG | HEIGHT: 60 IN | WEIGHT: 208.4 LBS | RESPIRATION RATE: 17 BRPM | OXYGEN SATURATION: 100 % | DIASTOLIC BLOOD PRESSURE: 64 MMHG | TEMPERATURE: 96.9 F

## 2023-06-19 DIAGNOSIS — M87.052 AVASCULAR NECROSIS OF BONE OF HIP, LEFT (H): ICD-10-CM

## 2023-06-19 DIAGNOSIS — E11.21 DIABETIC NEPHROPATHY ASSOCIATED WITH TYPE 2 DIABETES MELLITUS (H): Primary | ICD-10-CM

## 2023-06-19 DIAGNOSIS — H53.8 BLURRED VISION: ICD-10-CM

## 2023-06-19 DIAGNOSIS — G44.219 EPISODIC TENSION-TYPE HEADACHE, NOT INTRACTABLE: ICD-10-CM

## 2023-06-19 DIAGNOSIS — R51.9 PAIN OF CHEEK: ICD-10-CM

## 2023-06-19 PROCEDURE — 99213 OFFICE O/P EST LOW 20 MIN: CPT | Performed by: INTERNAL MEDICINE

## 2023-06-19 ASSESSMENT — PAIN SCALES - GENERAL: PAINLEVEL: MODERATE PAIN (5)

## 2023-06-19 ASSESSMENT — ENCOUNTER SYMPTOMS: HEADACHES: 1

## 2023-06-19 NOTE — PATIENT INSTRUCTIONS
Please call [facility] at [phone] to schedule your imaging

## 2023-06-19 NOTE — PROGRESS NOTES
Assessment & Plan     Diabetic nephropathy associated with type 2 diabetes mellitus (H)  Stable.     Avascular necrosis of bone of hip, left (H)  Stable.    Episodic tension-type headache, not intractable  Episodic headache ongoing for 2 months.   Not concerned about stroke. Blurry vision is also intermittent.   Neuro exam normal. No focal deficit.   Can take Excedrin for headache.   Discussed if she developed focal symptoms, should go to the ER immediately.  - MR Brain w/o & w Contrast; Future  - acetaminophen-caffeine (EXCEDRIN TENSION HEADACHE) 500-65 MG TABS; Take 2 tablets by mouth every 6 hours as needed for mild pain    Blurred vision  - MR Brain w/o & w Contrast; Future    Pain of cheek  No swelling or lymph node enlargement. Will check US.   - US Head Neck Soft Tissue; Future      BMI:   Estimated body mass index is 40.7 kg/m  as calculated from the following:    Height as of this encounter: 1.524 m (5').    Weight as of this encounter: 94.5 kg (208 lb 6.4 oz).       See Patient Instructions    JEFFERY PINZON MD  [facility]    Raheem Cochran is a 77 year old, presenting for the following health issues:  Headache    Sammie is a very pleasant 77 year old lady who presented to the clinic for acute concerns.     She has hx of:    Headache: can be on any side, lasts about 15 mins to 2 hours. Some photophobia. Ongoing for 2 months. Now blurry vision which comes and goes. Headache: 5-10 episodes per day. Takes tylenol multiple times without any relief.     Swelling: left side of cheek, feels swollen to her, no lymph node enlargement. No pain, no tingling or burning, no tenderness.     No systemic symptoms.     Headache     History of Present Illness       Headaches:   Since the patient's last clinic visit, headaches are: worsened  The patient is getting headaches:  Some days three times a day up to eight or ten times a day.  She is not able to do normal daily activities when she has a  "migraine.  The patient is taking the following rescue/relief medications:  Tylenol   Patient states \"The relief is inconsistent\" from the rescue/relief medications.   The patient is taking the following medications to prevent migraines:  No medications to prevent migraines  In the past 4 weeks, the patient has gone to an Urgent Care or Emergency Room 0 times times due to headaches.    Reason for visit:  Daily headaches. Swollen lymph nodes on left side of neck, swelling in left cheek and neck. Earache left ear. Daily headaches.  Symptom onset:  More than a month  Symptoms include:  Pain and swelling on left side of neck    and cheek.  Symptom intensity:  Severe  Symptom progression:  Worsening  Had these symptoms before:  Yes  Has tried/received treatment for these symptoms:  No  What makes it worse:  No, they just come on and gets worse as the day goes on.  What makes it better:  No, I take two extra strength Tylenol two to three times a day, but it doesn t go away.    She eats 2-3 servings of fruits and vegetables daily.She consumes 0 sweetened beverage(s) daily.She exercises with enough effort to increase her heart rate 9 or less minutes per day.  She exercises with enough effort to increase her heart rate 3 or less days per week.   She is taking medications regularly.       Review of Systems   Neurological: Positive for headaches.         Objective    There were no vitals taken for this visit.  There is no height or weight on file to calculate BMI.  Physical Exam  Vitals reviewed.   Constitutional:       Appearance: Normal appearance.   HENT:      Mouth/Throat:      Lips: Pink.      Mouth: Mucous membranes are moist.      Tongue: No lesions.      Palate: No mass and lesions.      Pharynx: Oropharynx is clear. No pharyngeal swelling or posterior oropharyngeal erythema.      Tonsils: No tonsillar exudate or tonsillar abscesses.   Neck:      Thyroid: No thyroid mass.   Lymphadenopathy:      Cervical: No cervical " adenopathy.   Neurological:      Mental Status: She is alert and oriented to person, place, and time.      Cranial Nerves: Cranial nerves 2-12 are intact.      Motor: Motor function is intact.      Coordination: Coordination is intact.      Gait: Gait is intact.

## 2023-06-20 ENCOUNTER — TRANSFERRED RECORDS (OUTPATIENT)
Dept: HEALTH INFORMATION MANAGEMENT | Facility: CLINIC | Age: 78
End: 2023-06-20
Payer: MEDICARE

## 2023-06-21 ENCOUNTER — OFFICE VISIT (OUTPATIENT)
Dept: ENDOCRINOLOGY | Facility: CLINIC | Age: 78
End: 2023-06-21
Payer: MEDICARE

## 2023-06-21 ENCOUNTER — TELEPHONE (OUTPATIENT)
Dept: ENDOCRINOLOGY | Facility: CLINIC | Age: 78
End: 2023-06-21

## 2023-06-21 VITALS
DIASTOLIC BLOOD PRESSURE: 71 MMHG | HEIGHT: 60 IN | SYSTOLIC BLOOD PRESSURE: 145 MMHG | HEART RATE: 68 BPM | OXYGEN SATURATION: 99 % | BODY MASS INDEX: 41.05 KG/M2 | WEIGHT: 209.1 LBS

## 2023-06-21 DIAGNOSIS — E11.9 TYPE 2 DIABETES, HBA1C GOAL < 7% (H): Primary | ICD-10-CM

## 2023-06-21 DIAGNOSIS — C73 MALIGNANT NEOPLASM OF THYROID GLAND (H): ICD-10-CM

## 2023-06-21 DIAGNOSIS — E78.2 MIXED HYPERLIPIDEMIA: ICD-10-CM

## 2023-06-21 DIAGNOSIS — C73 PAPILLARY CARCINOMA OF THYROID (H): ICD-10-CM

## 2023-06-21 PROCEDURE — 99215 OFFICE O/P EST HI 40 MIN: CPT | Performed by: INTERNAL MEDICINE

## 2023-06-21 NOTE — PATIENT INSTRUCTIONS
(E11.69) Type 2 diabetes mellitus with other specified complication, without long-term current use of insulin (H)  (primary encounter diagnosis)  Comment: You are doing well with regards to diabetes mellitus, but could do better and we will get labs from endocrinology sent over  Plan:     (I10) Benign essential hypertension  Comment: blood pressure is excellent but may be that your dose of lisinopril is too high and we will reduce this to 20 mg daily and recheck labs again in 2-3 weeks  Plan:      Preop  Comment; You are medically optimized for your upcoming surgery pending EKG.  Labs reviewed and stable.  Hold lisinopril and furosemide on the day of procedure.     
Opt out

## 2023-06-21 NOTE — NURSING NOTE
Chief Complaint   Patient presents with     RECHECK     Type 2 diabetes, HbA1c goal < 7%        Vitals:    06/21/23 1141   BP: (!) 145/71   BP Location: Right arm   Patient Position: Sitting   Cuff Size: Adult Regular   Pulse: 68   SpO2: 99%   Weight: 94.8 kg (209 lb 1.6 oz)   Height: 1.524 m (5')       Body mass index is 40.84 kg/m .    Jorge Yao MA

## 2023-06-21 NOTE — TELEPHONE ENCOUNTER
Called pt and offered to mail AVS, or could walk her through [employer] to find it.  Pt preferred help finding it on [employer].  Pt was able to find AVS, and has a printer at home if she chooses to print it.  Gina Alvarez RN

## 2023-06-21 NOTE — TELEPHONE ENCOUNTER
[facility] Call Center    Phone Message    May a detailed message be left on voicemail: yes     Reason for Call: Other: .      Per Patient is wanting to get a call back. Patient states Dr. Weston had printed out the After Visit Summary but patient did not receive them. Patient states the information on the After Summary Visit was important and wanting to get a call back to be provided that information. Please advise.     Action Taken: Message routed to:  [facility] Endo    Travel Screening: Not Applicable

## 2023-06-21 NOTE — PROGRESS NOTES
Chasity Hodgson is a 77 year old year old female here for evaluation of Diabetes via a billable video visit.SHe also has PMHx of Diabetes Mellitus, and PTC/post surgical hypothyrodisim. She also has past medical istory of ANNETTE, gastroparesis, obesity,  Last seen by me March 2023    INTERVAL HISTORY:  - Trouble paying for tresiba, need new option, pending patient assistance program  - Stopped ozempic with worsening nausea/vomiting gastroparesis symptoms- did have improvement after stopping ozempic  -  Swelling off/on, feels has worsened since restarting sitagliptin  - Noting some blurry vision, some headaches, improve after 10 min-- eye appt in August, may try to get sooner    1) Diabetes Mellitus    Diabetes History:  Diagnosis: 2015, picked up on routine labs  Hospitalizations: None  Previous Regimens: Farxia (difficulty with frequent urination), Toujeo (was working well but insurance formulary changed 1/1/2021) At highest was at 48u daily, and then had profound low. At time of discontinuing, was down to 5u of toujeo. Previously on metformin. Glipizide, Ozepmic (worsening gastroparesis), Sitagliptin (swelling in leg)  Current Regimen: Tresiba 25u daily, if under 150 when goes to dose, will take only 12,     BG check- Freestyle Libre3  Trends-   Overall high-- ave higher than previous, with less time in range                  Complications: Gastroparesis- previously on reglan, overall not impactful    Last eye exam: Nov 22, 2021- no retinopathy, has cataracts, upcoming appt in August 2023 for next check  Foot Exam: [facility], checks every 60 days   Blood pressure- Lisinopril 40mg daily, Atenolol 50mg daily  Lipids- ezetimibe 10 mg daily, fenofibrate 145mg daily  SIOBHAN last 7/7- 25.52    2) Hypothyroidism s/p thyroidectomy for PTC  Diagnosis: known 3 nodules that were being monitored, and in 2002 s/p thyroidectomy (nodule in isthmus was cancer), s/p BUTLER (per patient, unclear if clean margins so received BUTLER,  unknown dose)  Treatment: Levothyroxine 112mcg  Residual tissue on R that has been monitored, not growing.   Last ultrasound 10/2021- no tissue seen  last biomarkers 10/7/2021- TG <0.1, TGAb <0.4      3) Vitamin D Deficiency  - Taking 2 tab daily  - Most recent 7/7/2021- 73  - PTH 9 (9/16/2021)--> recheck 21 10/21/2022  - Recent Vit D 81,     4) Hypertriglyceridemia  - On ezetimibe and fenofibrate, tried statin before with myalgias  - Eats minimal processed foods, minimal fried foods/baked treats  - Improved to 183 last check    BP Readings from Last 3 Encounters:   06/21/23 (!) 145/71   06/19/23 138/64   06/02/23 (!) 145/69       Lab Results   Component Value Date    A1C 9.6 01/11/2022    A1C 9.5 10/07/2021    A1C 9.0 08/02/2021    A1C 8.7 07/07/2021    A1C 6.2 11/27/2020    A1C 6.8 09/10/2019       Recent Labs   Lab Test 01/11/22  1359 03/29/19  1355 08/01/17  0000 02/15/17  0000 02/11/16  0000 03/31/15  1327   CHOL 177 196   < > 142   < > 158   HDL 34* 27*   < > 31   < > 34*   LDL 81 99   < > 49   < > 51   TRIG 308* 349*   < > 309   < > 365*   CHOLHDLRATIO  --   --   --  4.6  --  4.6    < > = values in this interval not displayed.       Lab Results   Component Value Date    MICROL 36 01/11/2022    MICROL 23 07/07/2021     No results found for: MICROALBUMIN        Wt Readings from Last 3 Encounters:   06/21/23 94.8 kg (209 lb 1.6 oz)   06/19/23 94.5 kg (208 lb 6.4 oz)   05/26/23 96.2 kg (212 lb)       Current Outpatient Medications   Medication Sig Dispense Refill     aspirin (ASA) 81 MG EC tablet Take 81 mg by mouth daily       azelastine (ASTEPRO) 0.15 % nasal spray 1 spray       carvedilol (COREG) 12.5 MG tablet Take 1 tablet (12.5 mg) by mouth 2 times daily (with meals) 180 tablet 3     chlorthalidone (HYGROTON) 25 MG tablet Take 0.5 tablets (12.5 mg) by mouth daily 15 tablet 0     fenofibrate (TRICOR) 145 MG tablet Take 1 tablet (145 mg) by mouth daily 90 tablet 3     ferrous sulfate (IRON) 325 (65 FE) MG  tablet Take 325 mg by mouth daily (with breakfast)       fexofenadine (ALLEGRA) 180 MG tablet Take 180 mg by mouth daily. 120 0     fluocinolone acetonide (DERMA SMOOTHE/FS BODY) 0.01 % external oil Apply 2 mL to scalp once per week. Massage into scalp. Can be left in overnight or washed out after 4-6 hours. 118.28 mL 5     fluticasone (FLONASE) 50 MCG/ACT spray Spray 2 sprays in nostril daily 2 sprays in each nostril qd 1 Bottle 0     glipiZIDE (GLUCOTROL) 5 MG tablet Take by mouth every 12 hours       Icosapent Ethyl 1 g CAPS Take 1 g by mouth 2 times daily 60 capsule 11     insulin degludec (TRESIBA FLEXTOUCH) 100 UNIT/ML pen Inject 25 Units Subcutaneous every morning. 15 mL 3     lisinopril (ZESTRIL) 40 MG tablet Take 1 tablet (40 mg) by mouth daily 90 tablet 3     MULTIPLE VITAMIN PO Take 1 tablet by mouth       pantoprazole (PROTONIX) 40 MG EC tablet Take 40 mg by mouth 2 times daily       polyethylene glycol 400 (BLINK TEARS) 0.25 % SOLN ophthalmic solution        sitagliptin (JANUVIA) 50 MG tablet Take 1 tablet (50 mg) by mouth daily 90 tablet 3     sucralfate (CARAFATE) 1 GM/10ML suspension Take 1 g by mouth 4 times daily       SYNTHROID 112 MCG tablet TAKE ONE-HALF TABLET BY MOUTH DAILY ON FRIDAYS AND TAKE ONE TABLET BY MOUTH ALL OTHER DAYS AS DIRECTED 90 tablet 3     tolterodine ER (DETROL LA) 4 MG 24 hr capsule Take 1 capsule (4 mg) by mouth daily 30 capsule 11     tretinoin (RETIN-A) 0.025 % external cream Use every night as tolerated - spot treat lesion 20 g 3     triamcinolone (KENALOG) 0.1 % external ointment Apply topically every other day 80 g 3     Vitamin D3 50 mcg (2000 units) tablet 1 tablet daily       acetaminophen (TYLENOL) 500 MG tablet Take 1,000 mg by mouth       acetaminophen-caffeine (EXCEDRIN TENSION HEADACHE) 500-65 MG TABS Take 2 tablets by mouth every 6 hours as needed for mild pain 90 tablet 0     albuterol (PROAIR HFA/PROVENTIL HFA/VENTOLIN HFA) 108 (90 Base) MCG/ACT inhaler  albuterol sulfate HFA 90 mcg/actuation aerosol inhaler       amoxicillin (AMOXIL) 500 MG capsule Takes 4 caps before every dental appointments.       clotrimazole (LOTRIMIN) 1 % cream Apply topically daily       Continuous Blood Gluc Sensor (FREESTYLE BRANDY 3 SENSOR) MISC 1 each every 14 days 6 each 3     diphenhydrAMINE-acetaminophen (TYLENOL PM)  MG tablet Take 1 tablet by mouth nightly as needed for sleep Reported on 3/20/2017       ezetimibe (ZETIA) 10 MG tablet Take 1 tablet (10 mg) by mouth daily 90 tablet 3     famotidine (PEPCID) 20 MG tablet Take 2 tablets (40 mg) by mouth as needed 180 tablet 3     furosemide (LASIX) 20 MG tablet Take 1 tablet (20 mg) by mouth daily as needed (lower extremity edema) 90 tablet 3     insulin pen needle (B-D U/F) 31G X 5 MM miscellaneous Use 1 pen needles daily or as directed. 90 each 3     nitroGLYcerin (NITROSTAT) 0.4 MG sublingual tablet Place 1 tablet (0.4 mg) under the tongue every 5 minutes as needed for chest pain (no more than 3 in one hour; after 3rd, call 911.) 25 tablet 3     nystatin (MYCOSTATIN) cream Apply topically daily as needed        nystatin-triamcinolone (MYCOLOG II) cream Apply topically daily as needed        ondansetron (ZOFRAN) 4 MG tablet Take 1 tablet (4 mg) by mouth every 6 hours as needed Reported on 3/20/2017 20 tablet 0     order for DME Equipment being ordered: Compression stockings - Knee High; 20-30 mmHg compression - note would like adhesive band to keep the stocking from sliding down 3 each 0       Histories reviewed and updated in Epic.  Past Medical History:   Diagnosis Date     Abdominal adhesions 1984, 96,99    s/p lysis     Abdominal adhesions      Acne rosacea      Allergic rhinitis      Allergic rhinitis, cause unspecified      Alopecia      Anemia      CAD (coronary artery disease)      Carotid stenosis      CKD (chronic kidney disease) stage 2, GFR 60-89 ml/min      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)      CPAP (continuous positive airway  pressure) dependence      Depression      Diabetic gastroparesis (H)      Diet-controlled type 2 diabetes mellitus (H)      DM2 (diabetes mellitus, type 2) (H)      DVT (deep venous thrombosis) (H)      DVT of axillary vein, acute right (H)      Fibromyalgia      Gastro-oesophageal reflux disease      GERD (gastroesophageal reflux disease)      Hernia of unspecified site of abdominal cavity without mention of obstruction or gangrene     bilateral     Hernia, abdominal      History of blood transfusion     10/ 1980     History of thrombophlebitis      HTN (hypertension)      HTN (hypertension)      Hypertriglyceridemia      Hypertriglyceridemia      Hypokalemia      Hypothyroidism      Mumps      Obstructive sleep apnea      ANNETTE (obstructive sleep apnea)      ANNETTE on CPAP      Osteoarthritis of glenohumeral joint      Papillary carcinoma of thyroid (H)     s/p thyroidectomy - [facility]ue[facility]er     Papillary carcinoma of thyroid (H)      PE (pulmonary embolism)      Poliomyelitis     poor circulation right leg     Poliomyelitis      Postsurgical hypothyroidism     s/p papillary thryoid cancer - Ruegemer     Pulmonary embolism (H)      Pulmonary embolus (H)      Pulmonary nodule      Rosacea      S/P carpal tunnel release     bilateral     S/P hardware removal 01/2014    still with lingering foot pain     S/P shoulder surgery     bilateral     Septic joint (H)     right knee     Septic joint of right knee joint (H)      Venous insufficiency      Venous insufficiency      Venous thrombosis 1999    right axillary vein       Past Surgical History:   Procedure Laterality Date     AMPUTATE TOE(S)  03/15/2012    Procedure:AMPUTATE TOE(S); Surgeon:RUBEN MOSES; Location:[facility] OR     AMPUTATE TOE(S)       APPENDECTOMY  1972     APPENDECTOMY       ARTHRODESIS FOOT  03/15/2012    Procedure:ARTHRODESIS FOOT; RIGHT TRIPLE ARTHRODESIS, FIFTH TOE AMPUTATION, LATERAL LIGAMENT RECONSTRUCTION, TENDON TRANSFER AND RELEASE [MINI C-ARM,  ARTHREX 4.5 AND 6.7 STAPLES, BIOCOMPOSITE TENODESIS SCREWS]; Surgeon:SUKHDEEP METZ; Location: OR     ARTHRODESIS FOOT Right     Right foot triple arthrodesis and removal of hardware     ARTHROSCOPY SHOULDER  06/25/2015    REVISION SUBACROMIAL DECOMPRESSION, EXCISION OF GANGLION CYST, DEBRIDEMENT AND EXCISION OF THE GLENOHUMERAL JOINT GANGLION CYST, CORACOID DECOMPRESSION, POSSIBLE SUBSCAPULARIS REPAIR AND OPEN SUBSCAPULARIS BICEP     ARTHROSCOPY SHOULDER ROTATOR CUFF REPAIR       BIOPSY  Thyroid 2002     BLADDER SURGERY       BREAST SURGERY  Biopsy     CHOLECYSTECTOMY       CHOLECYSTECTOMY       COLONOSCOPY  2018     COLOSTOMY  02/07/2012    Procedure:COLOSTOMY; CREATION OF SIGMOID COLOSTOMY AND EXTENSIVE  LYSIS OF ADHESIONS; Surgeon:MONTSERRAT BENDER; Location: OR     COLOSTOMY       CYSTOSCOPY       EYE SURGERY       GENITOURINARY SURGERY  1999     GI SURGERY      weakened rectal sphincter with artificial stimulator     HERNIA REPAIR  1976     HYSTERECTOMY TOTAL ABDOMINAL       LAPAROTOMY, LYSIS ADHESIONS, COMBINED  02/07/2012    Procedure:COMBINED LAPAROTOMY, LYSIS ADHESIONS; Surgeon:MONTSERRAT BENDER; Location: OR     RELEASE CARPAL TUNNEL       RELEASE TENDON FOOT  03/15/2012    Procedure:RELEASE TENDON FOOT; Surgeon:SUKHDEEP METZ; Location: OR     REMOVE HARDWARE FOOT  12/13/2012    Procedure: REMOVE HARDWARE FOOT;  RIGHT FOOT REMOVAL OF HARDWARE;  Surgeon: Sukhdeep Metz MD;  Location:  OR     SHOULDER SURGERY  11/12/2020    LEFT SHOULDER HEMIARHTROPLASTY, BICEP TENODESIS     SOFT TISSUE SURGERY  2018, 2020     TENDON RELEASE      foot     THYROIDECTOMY       Z FREEING BOWEL ADHESION,ENTEROLYSIS      1986, 1996, 1999     Z NONSPECIFIC PROCEDURE      throidectomy     ZC TOTAL ABDOM HYSTERECTOMY  1980    + BSO       Allergies:  Ketorolac tromethamine, Nsaids, Celecoxib, Morphine and related, Crestor [rosuvastatin], No clinical screening - see comments, Vioxx,  Conjugated estrogens, and Sulfa antibiotics    Social History     Tobacco Use     Smoking status: Never     Smokeless tobacco: Never   Substance Use Topics     Alcohol use: Never       Family History   Problem Relation Age of Onset     Arthritis Mother      Hypertension Mother      Cerebrovascular Disease Mother      Obesity Mother      Heart Disease Mother         MI's     Hypertension Father      Respiratory Father         Adult RDS     Diabetes Father      Arthritis Sister      Cancer Sister      Diabetes Sister      Hypertension Sister      Breast Cancer Sister      Depression Sister      Thyroid Disease Sister      Obesity Sister      Arthritis Sister      Hypertension Sister      Thyroid Disease Sister      Osteoporosis Sister      Obesity Sister      Cancer Sister         colon polup     Hypertension Sister      Osteoporosis Sister      Obesity Sister      Colon Cancer Sister      Lipids Sister      Obesity Sister      Obesity Maternal Grandmother         Dad s mother     Skin Cancer Maternal Grandmother         skin cancer unknown     Cancer Maternal Grandmother         unknown skin cancer on face     Obesity Paternal Grandmother         Mothers mother     Heart Disease Brother         MI at 54     Other Cancer Brother         Lung & bone     Lipids Brother      Hypertension Brother      Diabetes Brother      Hyperlipidemia Brother      Ovarian Cancer No family hx of           REVIEW OF SYSTEMS:   ROS: 10 point ROS neg other than the symptoms noted above in the HPI.      EXAM:  Vitals: BP (!) 145/71 (BP Location: Right arm, Patient Position: Sitting, Cuff Size: Adult Regular)   Pulse 68   Ht 1.524 m (5')   Wt 94.8 kg (209 lb 1.6 oz)   SpO2 99%   BMI 40.84 kg/m      BMIE= Body mass index is 40.84 kg/m .  Exam:  Constitutional: healthy, alert, no acute distress  Head: Normocephalic. No masses, lesions, no exophthalmos/proptosis  ENT: no palpable thyroid, no cervical lymph nodes  Respiratory:  nonlabored  Gastrointestinal: Abdomen soft, non-tender.  Musculoskeletal: extremities normal- no gross deformities noted, gait normal and normal muscle tone  Skin: no suspicious lesions or rashes  Neurologic: Gait normal. sensation grossly intact  Psychiatric: mentation appears normal, calm    Neck Ultrasound 8/2022:  FINDINGS: Postoperative changes of thyroidectomy are noted. No  evidence for a recurrent soft tissue lesion in the thyroidectomy bed.  No worrisome lesions are identified in the neck. Ultrasound scanning  through the region of the left neck palpable abnormality demonstrates  a normal-appearing left submandibular gland. Incidentally noted  atherosclerotic plaque is noted in the left internal carotid artery.                                                                    IMPRESSION:    1. No convincing evidence for recurrent malignancy in the neck.   2. The left neck palpable abnormality appears to correspond to a  normal left submandibular gland. If there is continued clinical  concern for a suspicious neck mass, consider contrast-enhanced CT or  MRI of the neck for further evaluation.        ASSESSMENT/PLAN:  No diagnosis found.  No orders of the defined types were placed in this encounter.      1) Diabetes Mellitus   - Glucose Control- NOT at goal,   - Continue Tresiba 25u every morning, if AM  or less, take 12u-- will message pharmacy to find alternative   - Restart  Jardiance 10mg for trial if polyuria is unbearable will stop   - Start Novolog 5u with each meal (eats twice daily)  - Cardiovascular Risk- continue ezetimibe, fenofibrate,   - Ophthalmology- uptodate, instructed to return Nov 2022, no NPDR.  - Podiatry- patient instructed on routine foot care, follows with podiatry  - Renal- Uptodate,  continue lisinopril    2) Cardiovascular Risk-   - Continue ezetimibe, fenofibrate.   - Continue icosapent ethyl 1g BID, triglycerides improved  - Continue aspirin 81mg  daily     3) Hypothyroidism, postsurgical  - TSH/FT4 at goal  - Continue LT4 112mcg     4) History of Papillary Thyroid Cancer  - U/S and TG/TGab show biochemically and structurally complete response  - Now 20 years out  - enlarged LN, - no recurrence on ultrasound    RTC- 3 months as scheduled    A total of 45 minutes were spent today 06/21/23 on this visit including chart review, history and counseling, documentation and other activities as detailed above.     Answers for HPI/ROS submitted by the patient on 6/14/2023  General Symptoms: Yes  Skin Symptoms: Yes  HENT Symptoms: Yes  EYE SYMPTOMS: Yes  HEART SYMPTOMS: Yes  LUNG SYMPTOMS: Yes  INTESTINAL SYMPTOMS: Yes  URINARY SYMPTOMS: Yes  GYNECOLOGIC SYMPTOMS: No  BREAST SYMPTOMS: No  SKELETAL SYMPTOMS: Yes  BLOOD SYMPTOMS: Yes  NERVOUS SYSTEM SYMPTOMS: Yes  MENTAL HEALTH SYMPTOMS: No  Ear pain: Yes  Ear discharge: No  Hearing loss: No  Tinnitus: No  Nosebleeds: No  Congestion: No  Sinus pain: No  Trouble swallowing: Yes   Voice hoarseness: Yes  Mouth sores: Yes  Sore throat: Yes  Tooth pain: No  Gum tenderness: Yes  Bleeding gums: No  Change in taste: No  Change in sense of smell: No  Dry mouth: Yes  Hearing aid used: No  Neck lump: Yes  Fever: No  Loss of appetite: No  Weight loss: No  Weight gain: No  Fatigue: Yes  Night sweats: Yes  Chills: No  Increased stress: Yes  Excessive hunger: No  Excessive thirst: No  Feeling hot or cold when others believe the temperature is normal: Yes  Loss of height: No  Post-operative complications: No  Surgical site pain: No  Hallucinations: No  Change in or Loss of Energy: No  Hyperactivity: No  Confusion: No  Changes in hair: Yes  Changes in moles/birth marks: No  Itching: Yes  Rashes: No  Changes in nails: Yes  Acne: No  Hair in places you don't want it: Yes  Change in facial hair: No  Warts: No  Non-healing sores: No  Scarring: No  Flaking of skin: Yes  Color changes of hands/feet in cold : Yes  Sun sensitivity: No  Skin  thickening: No  Eye pain: No  Vision loss: Yes  Dry eyes: Yes  Watery eyes: No  Eye bulging: No  Double vision: Yes  Flashing of lights: No  Spots: Yes  Floaters: Yes  Redness: No  Crossed eyes: No  Tunnel Vision: No  Yellowing of eyes: No  Eye irritation: No  Chest pain or pressure: No  Fast or irregular heartbeat: No  Pain in legs with walking: Yes  Trouble breathing while lying down: Yes  Fingers or toes appear blue: Yes  High blood pressure: Yes  Low blood pressure: No  Fainting: No  Murmurs: Yes  Pacemaker: No  Varicose veins: No  Wake up at night with shortness of breath: No  Light-headedness: Yes  Exercise intolerance: Yes  Cough: Yes  Sputum or phlegm: Yes  Coughing up blood: No  Difficulty breating or shortness of breath: Yes  Snoring: Yes  Wheezing: No  Difficulty breathing on exertion: No  Nighttime Cough: No  Difficulty breathing when lying flat: Yes  Heart burn or indigestion: Yes  Nausea: Yes  Vomiting: Yes  Abdominal pain: Yes  Bloating: Yes  Constipation: No  Diarrhea: No  Blood in stool: No  Black stools: No  Rectal or Anal pain: Yes  Fecal incontinence: No  Yellowing of skin or eyes: No  Vomit with blood: No  Change in stools: No  Trouble holding urine or incontinence: Yes  Pain or burning: No  Trouble starting or stopping: Yes  Increased frequency of urination: No  Blood in urine: No  Decreased frequency of urination: No  Frequent nighttime urination: No  Flank pain: Yes  Difficulty emptying bladder: No  Back pain: Yes  Muscle aches: Yes  Neck pain: Yes  Swollen joints: No  Joint pain: Yes  Bone pain: No  Muscle cramps: Yes  Muscle weakness: No  Joint stiffness: No  Bone fracture: No  Edema or swelling: Yes  Anemia: No  Swollen glands: Yes  Easy bleeding or bruising: No  Trouble with coordination: No  Dizziness or trouble with balance: Yes  Fainting or black-out spells: No  Memory loss: No  Headache: Yes  Seizures: No  Speech problems: No  Tingling: No  Tremor: No  Weakness: Yes  Difficulty  walking: Yes  Paralysis: No  Numbness: No

## 2023-06-21 NOTE — TELEPHONE ENCOUNTER
PA Initiation    Medication: JARDIANCE 10 MG PO TABS  Insurance Company: [employer] - [employer] - Phone [phone] Fax [phone]  Pharmacy Filling the Rx: [place], [place] - [address]  Filling Pharmacy Phone:    Filling Pharmacy Fax:    Start Date: 6/21/2023

## 2023-06-21 NOTE — LETTER
6/21/2023         RE: Chasity Hodgson  [id number]  [facility] [address]        Dear Colleague,    Thank you for referring your patient, Chasity Hodgson, to the [facility] [place]. Please see a copy of my visit note below.    Chasity Hodgson is a 77 year old year old female here for evaluation of Diabetes via a billable video visit.SHe also has PMHx of Diabetes Mellitus, and PTC/post surgical hypothyrodisim. She also has past medical istory of ANNETTE, gastroparesis, obesity,  Last seen by me March 2023    INTERVAL HISTORY:  - Trouble paying for tresiba, need new option, pending patient assistance program  - Stopped ozempic with worsening nausea/vomiting gastroparesis symptoms- did have improvement after stopping ozempic  -  Swelling off/on, feels has worsened since restarting sitagliptin  - Noting some blurry vision, some headaches, improve after 10 min-- eye appt in August, may try to get sooner    1) Diabetes Mellitus    Diabetes History:  Diagnosis: 2015, picked up on routine labs  Hospitalizations: None  Previous Regimens: Farxia (difficulty with frequent urination), Toujeo (was working well but insurance formulary changed 1/1/2021) At highest was at 48u daily, and then had profound low. At time of discontinuing, was down to 5u of toujeo. Previously on metformin. Glipizide, Ozepmic (worsening gastroparesis), Sitagliptin (swelling in leg)  Current Regimen: Tresiba 25u daily, if under 150 when goes to dose, will take only 12,     BG check- Freestyle Libre3  Trends-   Overall high-- ave higher than previous, with less time in range                  Complications: Gastroparesis- previously on reglan, overall not impactful    Last eye exam: Nov 22, 2021- no retinopathy, has cataracts, upcoming appt in August 2023 for next check  Foot Exam: [facility], checks every 60 days   Blood pressure- Lisinopril 40mg daily, Atenolol 50mg daily  Lipids- ezetimibe 10 mg daily, fenofibrate 145mg  daily  SIOBHAN last 7/7- 25.52    2) Hypothyroidism s/p thyroidectomy for PTC  Diagnosis: known 3 nodules that were being monitored, and in 2002 s/p thyroidectomy (nodule in isthmus was cancer), s/p BUTLER (per patient, unclear if clean margins so received BUTLER, unknown dose)  Treatment: Levothyroxine 112mcg  Residual tissue on R that has been monitored, not growing.   Last ultrasound 10/2021- no tissue seen  last biomarkers 10/7/2021- TG <0.1, TGAb <0.4      3) Vitamin D Deficiency  - Taking 2 tab daily  - Most recent 7/7/2021- 73  - PTH 9 (9/16/2021)--> recheck 21 10/21/2022  - Recent Vit D 81,     4) Hypertriglyceridemia  - On ezetimibe and fenofibrate, tried statin before with myalgias  - Eats minimal processed foods, minimal fried foods/baked treats  - Improved to 183 last check    BP Readings from Last 3 Encounters:   06/21/23 (!) 145/71   06/19/23 138/64   06/02/23 (!) 145/69       Lab Results   Component Value Date    A1C 9.6 01/11/2022    A1C 9.5 10/07/2021    A1C 9.0 08/02/2021    A1C 8.7 07/07/2021    A1C 6.2 11/27/2020    A1C 6.8 09/10/2019       Recent Labs   Lab Test 01/11/22  1359 03/29/19  1355 08/01/17  0000 02/15/17  0000 02/11/16  0000 03/31/15  1327   CHOL 177 196   < > 142   < > 158   HDL 34* 27*   < > 31   < > 34*   LDL 81 99   < > 49   < > 51   TRIG 308* 349*   < > 309   < > 365*   CHOLHDLRATIO  --   --   --  4.6  --  4.6    < > = values in this interval not displayed.       Lab Results   Component Value Date    MICROL 36 01/11/2022    MICROL 23 07/07/2021     No results found for: MICROALBUMIN        Wt Readings from Last 3 Encounters:   06/21/23 94.8 kg (209 lb 1.6 oz)   06/19/23 94.5 kg (208 lb 6.4 oz)   05/26/23 96.2 kg (212 lb)       Current Outpatient Medications   Medication Sig Dispense Refill     aspirin (ASA) 81 MG EC tablet Take 81 mg by mouth daily       azelastine (ASTEPRO) 0.15 % nasal spray 1 spray       carvedilol (COREG) 12.5 MG tablet Take 1 tablet (12.5 mg) by mouth 2 times daily  (with meals) 180 tablet 3     chlorthalidone (HYGROTON) 25 MG tablet Take 0.5 tablets (12.5 mg) by mouth daily 15 tablet 0     fenofibrate (TRICOR) 145 MG tablet Take 1 tablet (145 mg) by mouth daily 90 tablet 3     ferrous sulfate (IRON) 325 (65 FE) MG tablet Take 325 mg by mouth daily (with breakfast)       fexofenadine (ALLEGRA) 180 MG tablet Take 180 mg by mouth daily. 120 0     fluocinolone acetonide (DERMA SMOOTHE/FS BODY) 0.01 % external oil Apply 2 mL to scalp once per week. Massage into scalp. Can be left in overnight or washed out after 4-6 hours. 118.28 mL 5     fluticasone (FLONASE) 50 MCG/ACT spray Spray 2 sprays in nostril daily 2 sprays in each nostril qd 1 Bottle 0     glipiZIDE (GLUCOTROL) 5 MG tablet Take by mouth every 12 hours       Icosapent Ethyl 1 g CAPS Take 1 g by mouth 2 times daily 60 capsule 11     insulin degludec (TRESIBA FLEXTOUCH) 100 UNIT/ML pen Inject 25 Units Subcutaneous every morning. 15 mL 3     lisinopril (ZESTRIL) 40 MG tablet Take 1 tablet (40 mg) by mouth daily 90 tablet 3     MULTIPLE VITAMIN PO Take 1 tablet by mouth       pantoprazole (PROTONIX) 40 MG EC tablet Take 40 mg by mouth 2 times daily       polyethylene glycol 400 (BLINK TEARS) 0.25 % SOLN ophthalmic solution        sitagliptin (JANUVIA) 50 MG tablet Take 1 tablet (50 mg) by mouth daily 90 tablet 3     sucralfate (CARAFATE) 1 GM/10ML suspension Take 1 g by mouth 4 times daily       SYNTHROID 112 MCG tablet TAKE ONE-HALF TABLET BY MOUTH DAILY ON FRIDAYS AND TAKE ONE TABLET BY MOUTH ALL OTHER DAYS AS DIRECTED 90 tablet 3     tolterodine ER (DETROL LA) 4 MG 24 hr capsule Take 1 capsule (4 mg) by mouth daily 30 capsule 11     tretinoin (RETIN-A) 0.025 % external cream Use every night as tolerated - spot treat lesion 20 g 3     triamcinolone (KENALOG) 0.1 % external ointment Apply topically every other day 80 g 3     Vitamin D3 50 mcg (2000 units) tablet 1 tablet daily       acetaminophen (TYLENOL) 500 MG tablet Take  1,000 mg by mouth       acetaminophen-caffeine (EXCEDRIN TENSION HEADACHE) 500-65 MG TABS Take 2 tablets by mouth every 6 hours as needed for mild pain 90 tablet 0     albuterol (PROAIR HFA/PROVENTIL HFA/VENTOLIN HFA) 108 (90 Base) MCG/ACT inhaler albuterol sulfate HFA 90 mcg/actuation aerosol inhaler       amoxicillin (AMOXIL) 500 MG capsule Takes 4 caps before every dental appointments.       clotrimazole (LOTRIMIN) 1 % cream Apply topically daily       Continuous Blood Gluc Sensor (FREESTYLE BRANDY 3 SENSOR) MISC 1 each every 14 days 6 each 3     diphenhydrAMINE-acetaminophen (TYLENOL PM)  MG tablet Take 1 tablet by mouth nightly as needed for sleep Reported on 3/20/2017       ezetimibe (ZETIA) 10 MG tablet Take 1 tablet (10 mg) by mouth daily 90 tablet 3     famotidine (PEPCID) 20 MG tablet Take 2 tablets (40 mg) by mouth as needed 180 tablet 3     furosemide (LASIX) 20 MG tablet Take 1 tablet (20 mg) by mouth daily as needed (lower extremity edema) 90 tablet 3     insulin pen needle (B-D U/F) 31G X 5 MM miscellaneous Use 1 pen needles daily or as directed. 90 each 3     nitroGLYcerin (NITROSTAT) 0.4 MG sublingual tablet Place 1 tablet (0.4 mg) under the tongue every 5 minutes as needed for chest pain (no more than 3 in one hour; after 3rd, call 911.) 25 tablet 3     nystatin (MYCOSTATIN) cream Apply topically daily as needed        nystatin-triamcinolone (MYCOLOG II) cream Apply topically daily as needed        ondansetron (ZOFRAN) 4 MG tablet Take 1 tablet (4 mg) by mouth every 6 hours as needed Reported on 3/20/2017 20 tablet 0     order for DME Equipment being ordered: Compression stockings - Knee High; 20-30 mmHg compression - note would like adhesive band to keep the stocking from sliding down 3 each 0       Histories reviewed and updated in Epic.  Past Medical History:   Diagnosis Date     Abdominal adhesions 1984, 96,99    s/p lysis     Abdominal adhesions      Acne rosacea      Allergic rhinitis       Allergic rhinitis, cause unspecified      Alopecia      Anemia      CAD (coronary artery disease)      Carotid stenosis      CKD (chronic kidney disease) stage 2, GFR 60-89 ml/min      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)      CPAP (continuous positive airway pressure) dependence      Depression      Diabetic gastroparesis (H)      Diet-controlled type 2 diabetes mellitus (H)      DM2 (diabetes mellitus, type 2) (H)      DVT (deep venous thrombosis) (H)      DVT of axillary vein, acute right (H)      Fibromyalgia      Gastro-oesophageal reflux disease      GERD (gastroesophageal reflux disease)      Hernia of unspecified site of abdominal cavity without mention of obstruction or gangrene     bilateral     Hernia, abdominal      History of blood transfusion     10/ 1980     History of thrombophlebitis      HTN (hypertension)      HTN (hypertension)      Hypertriglyceridemia      Hypertriglyceridemia      Hypokalemia      Hypothyroidism      Mumps      Obstructive sleep apnea      ANNETTE (obstructive sleep apnea)      ANNETTE on CPAP      Osteoarthritis of glenohumeral joint      Papillary carcinoma of thyroid (H)     s/p thyroidectomy - Ruegemer     Papillary carcinoma of thyroid (H)      PE (pulmonary embolism)      Poliomyelitis     poor circulation right leg     Poliomyelitis      Postsurgical hypothyroidism     s/p papillary thryoid cancer - Ruegemer     Pulmonary embolism (H)      Pulmonary embolus (H)      Pulmonary nodule      Rosacea      S/P carpal tunnel release     bilateral     S/P hardware removal 01/2014    still with lingering foot pain     S/P shoulder surgery     bilateral     Septic joint (H)     right knee     Septic joint of right knee joint (H)      Venous insufficiency      Venous insufficiency      Venous thrombosis 1999    right axillary vein       Past Surgical History:   Procedure Laterality Date     AMPUTATE TOE(S)  03/15/2012    Procedure:AMPUTATE TOE(S); Surgeon:RUBEN MOSES; Location: OR      AMPUTATE TOE(S)       APPENDECTOMY  1972     APPENDECTOMY       ARTHRODESIS FOOT  03/15/2012    Procedure:ARTHRODESIS FOOT; RIGHT TRIPLE ARTHRODESIS, FIFTH TOE AMPUTATION, LATERAL LIGAMENT RECONSTRUCTION, TENDON TRANSFER AND RELEASE [MINI C-ARM, ARTHREX 4.5 AND 6.7 STAPLES, BIOCOMPOSITE TENODESIS SCREWS]; Surgeon:SUKHDEEP METZ; Location: OR     ARTHRODESIS FOOT Right     Right foot triple arthrodesis and removal of hardware     ARTHROSCOPY SHOULDER  06/25/2015    REVISION SUBACROMIAL DECOMPRESSION, EXCISION OF GANGLION CYST, DEBRIDEMENT AND EXCISION OF THE GLENOHUMERAL JOINT GANGLION CYST, CORACOID DECOMPRESSION, POSSIBLE SUBSCAPULARIS REPAIR AND OPEN SUBSCAPULARIS BICEP     ARTHROSCOPY SHOULDER ROTATOR CUFF REPAIR       BIOPSY  Thyroid 2002     BLADDER SURGERY       BREAST SURGERY  Biopsy     CHOLECYSTECTOMY       CHOLECYSTECTOMY       COLONOSCOPY  2018     COLOSTOMY  02/07/2012    Procedure:COLOSTOMY; CREATION OF SIGMOID COLOSTOMY AND EXTENSIVE  LYSIS OF ADHESIONS; Surgeon:MONTSERRAT BENDER; Location: OR     COLOSTOMY       CYSTOSCOPY       EYE SURGERY       GENITOURINARY SURGERY  1999     GI SURGERY      weakened rectal sphincter with artificial stimulator     HERNIA REPAIR  1976     HYSTERECTOMY TOTAL ABDOMINAL       LAPAROTOMY, LYSIS ADHESIONS, COMBINED  02/07/2012    Procedure:COMBINED LAPAROTOMY, LYSIS ADHESIONS; Surgeon:MONTSERRAT BENDER; Location: OR     RELEASE CARPAL TUNNEL       RELEASE TENDON FOOT  03/15/2012    Procedure:RELEASE TENDON FOOT; Surgeon:SUKHDEEP METZ; Location: OR     REMOVE HARDWARE FOOT  12/13/2012    Procedure: REMOVE HARDWARE FOOT;  RIGHT FOOT REMOVAL OF HARDWARE;  Surgeon: Sukhdeep Metz MD;  Location:  OR     SHOULDER SURGERY  11/12/2020    LEFT SHOULDER HEMIARHTROPLASTY, BICEP TENODESIS     SOFT TISSUE SURGERY  2018, 2020     TENDON RELEASE      foot     THYROIDECTOMY       Z FREEING BOWEL ADHESION,ENTEROLYSIS      1986, 1996, 1999      ZZC NONSPECIFIC PROCEDURE      throidectomy     ZZC TOTAL ABDOM HYSTERECTOMY  1980    + BSO       Allergies:  Ketorolac tromethamine, Nsaids, Celecoxib, Morphine and related, Crestor [rosuvastatin], No clinical screening - see comments, Vioxx, Conjugated estrogens, and Sulfa antibiotics    Social History     Tobacco Use     Smoking status: Never     Smokeless tobacco: Never   Substance Use Topics     Alcohol use: Never       Family History   Problem Relation Age of Onset     Arthritis Mother      Hypertension Mother      Cerebrovascular Disease Mother      Obesity Mother      Heart Disease Mother         MI's     Hypertension Father      Respiratory Father         Adult RDS     Diabetes Father      Arthritis Sister      Cancer Sister      Diabetes Sister      Hypertension Sister      Breast Cancer Sister      Depression Sister      Thyroid Disease Sister      Obesity Sister      Arthritis Sister      Hypertension Sister      Thyroid Disease Sister      Osteoporosis Sister      Obesity Sister      Cancer Sister         colon polup     Hypertension Sister      Osteoporosis Sister      Obesity Sister      Colon Cancer Sister      Lipids Sister      Obesity Sister      Obesity Maternal Grandmother         Dad s mother     Skin Cancer Maternal Grandmother         skin cancer unknown     Cancer Maternal Grandmother         unknown skin cancer on face     Obesity Paternal Grandmother         Mothers mother     Heart Disease Brother         MI at 54     Other Cancer Brother         Lung & bone     Lipids Brother      Hypertension Brother      Diabetes Brother      Hyperlipidemia Brother      Ovarian Cancer No family hx of           REVIEW OF SYSTEMS:   ROS: 10 point ROS neg other than the symptoms noted above in the HPI.      EXAM:  Vitals: BP (!) 145/71 (BP Location: Right arm, Patient Position: Sitting, Cuff Size: Adult Regular)   Pulse 68   Ht 1.524 m (5')   Wt 94.8 kg (209 lb 1.6 oz)   SpO2 99%   BMI 40.84  kg/m      BMIE= Body mass index is 40.84 kg/m .  Exam:  Constitutional: healthy, alert, no acute distress  Head: Normocephalic. No masses, lesions, no exophthalmos/proptosis  ENT: no palpable thyroid, no cervical lymph nodes  Respiratory: nonlabored  Gastrointestinal: Abdomen soft, non-tender.  Musculoskeletal: extremities normal- no gross deformities noted, gait normal and normal muscle tone  Skin: no suspicious lesions or rashes  Neurologic: Gait normal. sensation grossly intact  Psychiatric: mentation appears normal, calm    Neck Ultrasound 8/2022:  FINDINGS: Postoperative changes of thyroidectomy are noted. No  evidence for a recurrent soft tissue lesion in the thyroidectomy bed.  No worrisome lesions are identified in the neck. Ultrasound scanning  through the region of the left neck palpable abnormality demonstrates  a normal-appearing left submandibular gland. Incidentally noted  atherosclerotic plaque is noted in the left internal carotid artery.                                                                    IMPRESSION:    1. No convincing evidence for recurrent malignancy in the neck.   2. The left neck palpable abnormality appears to correspond to a  normal left submandibular gland. If there is continued clinical  concern for a suspicious neck mass, consider contrast-enhanced CT or  MRI of the neck for further evaluation.        ASSESSMENT/PLAN:  No diagnosis found.  No orders of the defined types were placed in this encounter.      1) Diabetes Mellitus   - Glucose Control- NOT at goal,   - Continue Tresiba 25u every morning, if AM  or less, take 12u-- will message pharmacy to find alternative   - Restart  Jardiance 10mg for trial if polyuria is unbearable will stop   - Start Novolog 5u with each meal (eats twice daily)  - Cardiovascular Risk- continue ezetimibe, fenofibrate,   - Ophthalmology- uptodate, instructed to return Nov 2022, no NPDR.  - Podiatry- patient instructed on routine foot care,  follows with podiatry  - Renal- Uptodate,  continue lisinopril    2) Cardiovascular Risk-   - Continue ezetimibe, fenofibrate.   - Continue icosapent ethyl 1g BID, triglycerides improved  - Continue aspirin 81mg daily     3) Hypothyroidism, postsurgical  - TSH/FT4 at goal  - Continue LT4 112mcg     4) History of Papillary Thyroid Cancer  - U/S and TG/TGab show biochemically and structurally complete response  - Now 20 years out  - enlarged LN, - no recurrence on ultrasound    RTC- 3 months as scheduled    A total of 45 minutes were spent today 06/21/23 on this visit including chart review, history and counseling, documentation and other activities as detailed above.     Answers for HPI/ROS submitted by the patient on 6/14/2023  General Symptoms: Yes  Skin Symptoms: Yes  HENT Symptoms: Yes  EYE SYMPTOMS: Yes  HEART SYMPTOMS: Yes  LUNG SYMPTOMS: Yes  INTESTINAL SYMPTOMS: Yes  URINARY SYMPTOMS: Yes  GYNECOLOGIC SYMPTOMS: No  BREAST SYMPTOMS: No  SKELETAL SYMPTOMS: Yes  BLOOD SYMPTOMS: Yes  NERVOUS SYSTEM SYMPTOMS: Yes  MENTAL HEALTH SYMPTOMS: No  Ear pain: Yes  Ear discharge: No  Hearing loss: No  Tinnitus: No  Nosebleeds: No  Congestion: No  Sinus pain: No  Trouble swallowing: Yes   Voice hoarseness: Yes  Mouth sores: Yes  Sore throat: Yes  Tooth pain: No  Gum tenderness: Yes  Bleeding gums: No  Change in taste: No  Change in sense of smell: No  Dry mouth: Yes  Hearing aid used: No  Neck lump: Yes  Fever: No  Loss of appetite: No  Weight loss: No  Weight gain: No  Fatigue: Yes  Night sweats: Yes  Chills: No  Increased stress: Yes  Excessive hunger: No  Excessive thirst: No  Feeling hot or cold when others believe the temperature is normal: Yes  Loss of height: No  Post-operative complications: No  Surgical site pain: No  Hallucinations: No  Change in or Loss of Energy: No  Hyperactivity: No  Confusion: No  Changes in hair: Yes  Changes in moles/birth marks: No  Itching: Yes  Rashes: No  Changes in nails: Yes  Acne:  No  Hair in places you don't want it: Yes  Change in facial hair: No  Warts: No  Non-healing sores: No  Scarring: No  Flaking of skin: Yes  Color changes of hands/feet in cold : Yes  Sun sensitivity: No  Skin thickening: No  Eye pain: No  Vision loss: Yes  Dry eyes: Yes  Watery eyes: No  Eye bulging: No  Double vision: Yes  Flashing of lights: No  Spots: Yes  Floaters: Yes  Redness: No  Crossed eyes: No  Tunnel Vision: No  Yellowing of eyes: No  Eye irritation: No  Chest pain or pressure: No  Fast or irregular heartbeat: No  Pain in legs with walking: Yes  Trouble breathing while lying down: Yes  Fingers or toes appear blue: Yes  High blood pressure: Yes  Low blood pressure: No  Fainting: No  Murmurs: Yes  Pacemaker: No  Varicose veins: No  Wake up at night with shortness of breath: No  Light-headedness: Yes  Exercise intolerance: Yes  Cough: Yes  Sputum or phlegm: Yes  Coughing up blood: No  Difficulty breating or shortness of breath: Yes  Snoring: Yes  Wheezing: No  Difficulty breathing on exertion: No  Nighttime Cough: No  Difficulty breathing when lying flat: Yes  Heart burn or indigestion: Yes  Nausea: Yes  Vomiting: Yes  Abdominal pain: Yes  Bloating: Yes  Constipation: No  Diarrhea: No  Blood in stool: No  Black stools: No  Rectal or Anal pain: Yes  Fecal incontinence: No  Yellowing of skin or eyes: No  Vomit with blood: No  Change in stools: No  Trouble holding urine or incontinence: Yes  Pain or burning: No  Trouble starting or stopping: Yes  Increased frequency of urination: No  Blood in urine: No  Decreased frequency of urination: No  Frequent nighttime urination: No  Flank pain: Yes  Difficulty emptying bladder: No  Back pain: Yes  Muscle aches: Yes  Neck pain: Yes  Swollen joints: No  Joint pain: Yes  Bone pain: No  Muscle cramps: Yes  Muscle weakness: No  Joint stiffness: No  Bone fracture: No  Edema or swelling: Yes  Anemia: No  Swollen glands: Yes  Easy bleeding or bruising: No  Trouble with  coordination: No  Dizziness or trouble with balance: Yes  Fainting or black-out spells: No  Memory loss: No  Headache: Yes  Seizures: No  Speech problems: No  Tingling: No  Tremor: No  Weakness: Yes  Difficulty walking: Yes  Paralysis: No  Numbness: No          Again, thank you for allowing me to participate in the care of your patient.        Sincerely,        Odette Weston MD

## 2023-06-21 NOTE — TELEPHONE ENCOUNTER
Prior Authorization Approval    Medication: JARDIANCE 10 MG PO TABS  Authorization Effective Date: 5/22/2023  Authorization Expiration Date: 6/20/2024  Approved Dose/Quantity: 30 [er 30 days  Reference #:     Insurance Company: [employer] - Phone [phone] Fax [phone]  Expected CoPay: $30     CoPay Card Available:   Yes   Financial Assistance Needed:   Which Pharmacy is filling the prescription: [place], [place] - [address] [facility]  Pharmacy Notified: Yes  Patient Notified: Yes

## 2023-06-22 ENCOUNTER — MEDICAL CORRESPONDENCE (OUTPATIENT)
Dept: HEALTH INFORMATION MANAGEMENT | Facility: CLINIC | Age: 78
End: 2023-06-22

## 2023-06-23 DIAGNOSIS — E11.9 TYPE 2 DIABETES, HBA1C GOAL < 7% (H): Primary | ICD-10-CM

## 2023-06-23 RX ORDER — INSULIN GLARGINE 100 [IU]/ML
25 INJECTION, SOLUTION SUBCUTANEOUS DAILY
Qty: 15 ML | Refills: 4 | Status: ON HOLD | OUTPATIENT
Start: 2023-06-23 | End: 2023-09-01

## 2023-06-28 ENCOUNTER — OFFICE VISIT (OUTPATIENT)
Dept: FAMILY MEDICINE | Facility: CLINIC | Age: 78
End: 2023-06-28
Payer: MEDICARE

## 2023-06-28 VITALS
SYSTOLIC BLOOD PRESSURE: 150 MMHG | BODY MASS INDEX: 41.7 KG/M2 | TEMPERATURE: 97.5 F | OXYGEN SATURATION: 100 % | HEIGHT: 60 IN | WEIGHT: 212.4 LBS | DIASTOLIC BLOOD PRESSURE: 83 MMHG | RESPIRATION RATE: 17 BRPM | HEART RATE: 64 BPM

## 2023-06-28 DIAGNOSIS — Z01.818 PREOP GENERAL PHYSICAL EXAM: Primary | ICD-10-CM

## 2023-06-28 DIAGNOSIS — C73 PAPILLARY CARCINOMA OF THYROID (H): ICD-10-CM

## 2023-06-28 DIAGNOSIS — C73 MALIGNANT NEOPLASM OF THYROID GLAND (H): ICD-10-CM

## 2023-06-28 DIAGNOSIS — K66.0 ABDOMINAL ADHESIONS: ICD-10-CM

## 2023-06-28 DIAGNOSIS — E78.2 MIXED HYPERLIPIDEMIA: ICD-10-CM

## 2023-06-28 DIAGNOSIS — I10 BENIGN ESSENTIAL HYPERTENSION: ICD-10-CM

## 2023-06-28 DIAGNOSIS — E11.21 DIABETIC NEPHROPATHY ASSOCIATED WITH TYPE 2 DIABETES MELLITUS (H): ICD-10-CM

## 2023-06-28 DIAGNOSIS — R60.0 EDEMA OF LOWER EXTREMITY: ICD-10-CM

## 2023-06-28 DIAGNOSIS — Z86.718 HISTORY OF DVT (DEEP VEIN THROMBOSIS): ICD-10-CM

## 2023-06-28 DIAGNOSIS — G47.33 OSA (OBSTRUCTIVE SLEEP APNEA): ICD-10-CM

## 2023-06-28 DIAGNOSIS — Z93.3 COLOSTOMY IN PLACE (H): ICD-10-CM

## 2023-06-28 DIAGNOSIS — E11.9 TYPE 2 DIABETES, HBA1C GOAL < 7% (H): ICD-10-CM

## 2023-06-28 DIAGNOSIS — I25.10 CORONARY ARTERY DISEASE INVOLVING NATIVE HEART WITHOUT ANGINA PECTORIS, UNSPECIFIED VESSEL OR LESION TYPE: ICD-10-CM

## 2023-06-28 LAB
ALBUMIN SERPL BCG-MCNC: 4 G/DL (ref 3.5–5.2)
ALP SERPL-CCNC: 42 U/L (ref 35–104)
ALT SERPL W P-5'-P-CCNC: 21 U/L (ref 0–50)
ANION GAP SERPL CALCULATED.3IONS-SCNC: 10 MMOL/L (ref 7–15)
AST SERPL W P-5'-P-CCNC: 28 U/L (ref 0–45)
BILIRUB SERPL-MCNC: 0.4 MG/DL
BUN SERPL-MCNC: 26.3 MG/DL (ref 8–23)
CALCIUM SERPL-MCNC: 9.7 MG/DL (ref 8.8–10.2)
CHLORIDE SERPL-SCNC: 101 MMOL/L (ref 98–107)
CHOLEST SERPL-MCNC: 172 MG/DL
CREAT SERPL-MCNC: 1.2 MG/DL (ref 0.51–0.95)
DEPRECATED HCO3 PLAS-SCNC: 24 MMOL/L (ref 22–29)
ERYTHROCYTE [DISTWIDTH] IN BLOOD BY AUTOMATED COUNT: 11.8 % (ref 10–15)
GFR SERPL CREATININE-BSD FRML MDRD: 47 ML/MIN/1.73M2
GLUCOSE SERPL-MCNC: 179 MG/DL (ref 70–99)
HBA1C MFR BLD: 7.9 % (ref 0–5.6)
HCT VFR BLD AUTO: 35.5 % (ref 35–47)
HDLC SERPL-MCNC: 32 MG/DL
HGB BLD-MCNC: 11.7 G/DL (ref 11.7–15.7)
LDLC SERPL CALC-MCNC: 83 MG/DL
MCH RBC QN AUTO: 30.4 PG (ref 26.5–33)
MCHC RBC AUTO-ENTMCNC: 33 G/DL (ref 31.5–36.5)
MCV RBC AUTO: 92 FL (ref 78–100)
NONHDLC SERPL-MCNC: 140 MG/DL
PLATELET # BLD AUTO: 215 10E3/UL (ref 150–450)
POTASSIUM SERPL-SCNC: 5.3 MMOL/L (ref 3.4–5.3)
PROT SERPL-MCNC: 6.6 G/DL (ref 6.4–8.3)
RBC # BLD AUTO: 3.85 10E6/UL (ref 3.8–5.2)
SODIUM SERPL-SCNC: 135 MMOL/L (ref 136–145)
T4 FREE SERPL-MCNC: 1.91 NG/DL (ref 0.9–1.7)
TRIGL SERPL-MCNC: 284 MG/DL
TSH SERPL DL<=0.005 MIU/L-ACNC: 5.66 UIU/ML (ref 0.3–4.2)
WBC # BLD AUTO: 8.1 10E3/UL (ref 4–11)

## 2023-06-28 PROCEDURE — 80061 LIPID PANEL: CPT | Performed by: INTERNAL MEDICINE

## 2023-06-28 PROCEDURE — 82306 VITAMIN D 25 HYDROXY: CPT | Performed by: INTERNAL MEDICINE

## 2023-06-28 PROCEDURE — 84439 ASSAY OF FREE THYROXINE: CPT | Performed by: INTERNAL MEDICINE

## 2023-06-28 PROCEDURE — 85027 COMPLETE CBC AUTOMATED: CPT | Performed by: INTERNAL MEDICINE

## 2023-06-28 PROCEDURE — 36415 COLL VENOUS BLD VENIPUNCTURE: CPT | Performed by: INTERNAL MEDICINE

## 2023-06-28 PROCEDURE — 82570 ASSAY OF URINE CREATININE: CPT | Performed by: INTERNAL MEDICINE

## 2023-06-28 PROCEDURE — 84443 ASSAY THYROID STIM HORMONE: CPT | Performed by: INTERNAL MEDICINE

## 2023-06-28 PROCEDURE — 83036 HEMOGLOBIN GLYCOSYLATED A1C: CPT | Performed by: INTERNAL MEDICINE

## 2023-06-28 PROCEDURE — 82043 UR ALBUMIN QUANTITATIVE: CPT | Performed by: INTERNAL MEDICINE

## 2023-06-28 PROCEDURE — 99214 OFFICE O/P EST MOD 30 MIN: CPT | Performed by: INTERNAL MEDICINE

## 2023-06-28 PROCEDURE — 80053 COMPREHEN METABOLIC PANEL: CPT | Performed by: INTERNAL MEDICINE

## 2023-06-28 ASSESSMENT — PAIN SCALES - GENERAL: PAINLEVEL: NO PAIN (0)

## 2023-06-28 NOTE — PATIENT INSTRUCTIONS
(Z01.818) Preop general physical exam  (primary encounter diagnosis)  Comment: you are medically optimized for your upcoming surgery pending labs today.  Recent EKG reviewed and stable.  Hold all supplements and aspirin for 1 week leading up to surgery.  Hold Jardiance x 3 days prior to surgery.  Hold novolog on the morning of surgery.  Give only 16 units of long acting basaglar insulin on the day of surgery.  Hold lisinopril, chlorthalidone and furosemide on the day of surgery.  Continue carvedilol   Plan: CBC with platelets            (E11.21) Diabetic nephropathy associated with type 2 diabetes mellitus (H)  Comment: noted recenf follow up in endocrinology and we will check labs today with follow up    Plan:     (G47.33) ANNETTE (obstructive sleep apnea)  Comment: Conitnue use of CPAP  Plan:     (I10) Benign essential hypertension  Comment: blood pressure is elevated today, but quite fluctuant and we will monitor closely   Plan:     (I25.10) Coronary artery disease involving native heart without angina pectoris, unspecified vessel or lesion type  Comment: Hold aspirin, continue statin and beta blocker   Plan:     (R60.0) Edema of lower extremity  Comment: Doing well - infrequent need for lasix  Plan:     (Z86.718) History of DVT (deep vein thrombosis)  Comment: Noted in history - not taking any anticoagulation at this time.  Plan:     (K66.0) Abdominal adhesions  Comment: Plan for surgery   Plan:     (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)  Comment: doing well.  No issues  Plan:

## 2023-06-28 NOTE — PROGRESS NOTES
[address], [address]  [facility] [address]  Phone: [phone]  Primary Provider: Ana Benoit  Pre-op Performing Provider: ANA BENOIT      PREOPERATIVE EVALUATION:  Today's date: 6/28/2023    Chasity Hodgson is a 77 year old female who presents for a preoperative evaluation.       No data to display              Surgical Information:  Surgery/Procedure: Rectal Surgery  Surgery Location: [facility]  Surgeon: Dr Lawler  Surgery Date: 7/11/2023  Time of Surgery: TBD  Where patient plans to recover: At home with family  Fax number for surgical facility: [phone]        Subjective       HPI related to upcoming procedure: anal/rectal stenosis due to scarring and adhesions.         6/27/2023    12:25 PM   Preop Questions   1. Have you ever had a heart attack or stroke? No   2. Have you ever had surgery on your heart or blood vessels, such as a stent placement, a coronary artery bypass, or surgery on an artery in your head, neck, heart, or legs? No   3. Do you have chest pain with activity? No   4. Do you have a history of  heart failure? No   5. Do you currently have a cold, bronchitis or symptoms of other infection? No   6. Do you have a cough, shortness of breath, or wheezing? No   7. Do you or anyone in your family have previous history of blood clots? YES - personal history of deep vein thrombosis in distant past after surgery   8. Do you or does anyone in your family have a serious bleeding problem such as prolonged bleeding following surgeries or cuts? No   9. Have you ever had problems with anemia or been told to take iron pills? YES - not currently   10. Have you had any abnormal blood loss such as black, tarry or bloody stools, or abnormal vaginal bleeding? No   11. Have you ever had a blood transfusion? YES -    11a. Have you ever had a transfusion reaction? No   12. Are you willing to have a blood transfusion if it is medically needed before,  during, or after your surgery? Yes   13. Have you or any of your relatives ever had problems with anesthesia? UNKNOWN -    14. Do you have sleep apnea, excessive snoring or daytime drowsiness? YES - history of obstructive sleep apnea on CPAP   14a. Do you have a CPAP machine? Yes   15. Do you have any artifical heart valves or other implanted medical devices like a pacemaker, defibrillator, or continuous glucose monitor? No   16. Do you have artificial joints? YES - status post total hip arthroplasty left and bilateral shoulder replacement   17. Are you allergic to latex? No       Health Care Directive:  Patient has a Health Care Directive on file      Preoperative Review of :   reviewed - no record of controlled substances prescribed.      Status of Chronic Conditions:  See problem list for active medical problems.  Problems all longstanding and stable, except as noted/documented.  See ROS for pertinent symptoms related to these conditions.       Diabetic nephropathy associated with type 2 diabetes mellitus (H)   Following in endocrinology and due for eye exam in August. Last hemoglobin A1c was checked in March and stable.  Noted continues on lisinopril  ANNETTE (obstructive sleep apnea)   Using CPAP   Benign essential hypertension   Blood pressure has been flucutating up to 190's sysotlic and down to 130's.  Continuea on lisinopril and chlorthalidone and jardiance and carvedilol.   Coronary artery disease involving native heart without angina pectoris, unspecified vessel or lesion type   No recent chest pain.  Feeling well.  Edema of lower extremity   Continues Lasix as needed       Review of Systems  CONSTITUTIONAL: NEGATIVE for fever, chills, change in weight  INTEGUMENTARY/SKIN: NEGATIVE for worrisome rashes, moles or lesions  EYES: NEGATIVE for vision changes or irritation  ENT/MOUTH: NEGATIVE for ear, mouth and throat problems  RESP: NEGATIVE for significant cough or SOB  CV: NEGATIVE for chest pain,  palpitations or peripheral edema  GI: NEGATIVE for nausea, abdominal pain, heartburn, or change in bowel habits  : NEGATIVE for frequency, dysuria, or hematuria  MUSCULOSKELETAL: NEGATIVE for significant arthralgias or myalgia  NEURO: NEGATIVE for weakness, dizziness or paresthesias  ENDOCRINE: NEGATIVE for temperature intolerance, skin/hair changes  HEME: NEGATIVE for bleeding problems  PSYCHIATRIC: NEGATIVE for changes in mood or affect    Patient Active Problem List    Diagnosis Date Noted     Episodic tension-type headache, not intractable 06/19/2023     Priority: Medium     Blurred vision 06/19/2023     Priority: Medium     Pain of cheek 06/19/2023     Priority: Medium     Atypical chest pain 05/22/2023     Priority: Medium     Lymphedema 02/03/2023     Priority: Medium     DVT of upper extremity (deep vein thrombosis) (H) 12/30/2022     Priority: Medium     Formatting of this note might be different from the original.  right       Venous insufficiency      Priority: Medium     Edema of lower extremity 09/19/2022     Priority: Medium     Mild major depression (H) 07/12/2022     Priority: Medium     Neuropathic pain 06/02/2022     Priority: Medium     Primary insomnia 11/24/2020     Priority: Medium     Moderate protein-calorie malnutrition (H) 11/21/2020     Priority: Medium     Status post total shoulder arthroplasty, left 11/21/2020     Priority: Medium     Diabetic nephropathy associated with type 2 diabetes mellitus (H) 09/02/2020     Priority: Medium     Vitamin D deficiency 09/02/2020     Priority: Medium     Renal artery stenosis (H) 07/31/2020     Priority: Medium     Avascular necrosis of bone of hip, left (H) 05/14/2020     Priority: Medium     Osteoarthritis of left hip 05/14/2020     Priority: Medium     CKD (chronic kidney disease) stage 3, GFR 30-59 ml/min (H) 04/17/2019     Priority: Medium     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     Priority: Medium     Morbid obesity with BMI of 40.0-44.9,  adult (H) 08/01/2018     Priority: Medium     History of colonic polyps 05/31/2018     Priority: Medium     Iron deficiency anemia 05/31/2018     Priority: Medium     Surgical aftercare, musculoskeletal system 04/11/2018     Priority: Medium     History of pulmonary embolus (PE) 04/04/2018     Priority: Medium     Inguinal pain 03/20/2018     Priority: Medium     Right lower quadrant pain 03/20/2018     Priority: Medium     Normocytic anemia 01/16/2017     Priority: Medium     Allergic dermatitis due to other chemical product 10/02/2016     Priority: Medium     Insufficiency, arterial, peripheral (H) 11/08/2015     Priority: Medium     Mild seen on CARMITA 11/2015 - right sided       Type 2 diabetes mellitus with other specified complication (H) 10/18/2015     Priority: Medium     Carotid stenosis 12/09/2014     Priority: Medium     Mild increased stenosis 50-69% left ICA       Right shoulder pain 12/09/2014     Priority: Medium     Left knee pain 11/03/2014     Priority: Medium     Poliomyelitis      Priority: Medium     poor circulation right leg       Diabetic gastroparesis (H)      Priority: Medium     Dermatitis 02/15/2014     Priority: Medium     Acne rosacea 02/15/2014     Priority: Medium     Esophageal reflux 01/23/2014     Priority: Medium     Had upper endoscopy - Dr. Pantoja 2013       Dysphagia 09/16/2013     Priority: Medium     Pulmonary nodule 09/06/2013     Priority: Medium     Alopecia 02/09/2013     Priority: Medium     Problem list name updated by automated process. Provider to review       Papillary carcinoma of thyroid (H)      Priority: Medium     s/p thyroidectomy - Ruegemer       Gastroparesis 11/12/2012     Priority: Medium     Postsurgical hypothyroidism      Priority: Medium     s/p papillary thryoid cancer - Marj  Concern for possible recurrence 03/2014       Type 2 diabetes, HbA1c goal < 7% (H)      Priority: Medium     ACP (advance care planning) 09/21/2012     Priority: Medium      Discussed advance care planning with patient; information given to patient to review. 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)      CPAP (continuous positive airway pressure) dependence      Depression      Diabetic gastroparesis (H)      Diet-controlled type 2 diabetes mellitus (H)      DM2 (diabetes mellitus, type 2) (H)      DVT (deep venous thrombosis) (H)      DVT of axillary vein, acute right (H)      Fibromyalgia      Gastro-oesophageal reflux disease      GERD (gastroesophageal reflux disease)      Hernia of unspecified site of abdominal cavity without mention of obstruction or gangrene     bilateral     Hernia, abdominal      History of blood transfusion     10/ 1980     History of thrombophlebitis      HTN (hypertension)      HTN (hypertension)      Hypertriglyceridemia      Hypertriglyceridemia      Hypokalemia       Hypothyroidism      Mumps      Obstructive sleep apnea      ANNETTE (obstructive sleep apnea)      ANNETTE on CPAP      Osteoarthritis of glenohumeral joint      Papillary carcinoma of thyroid (H)     s/p thyroidectomy - Ruegemer     Papillary carcinoma of thyroid (H)      PE (pulmonary embolism)      Poliomyelitis     poor circulation right leg     Poliomyelitis      Postsurgical hypothyroidism     s/p papillary thryoid cancer - Ruegemer     Pulmonary embolism (H)      Pulmonary embolus (H)      Pulmonary nodule      Rosacea      S/P carpal tunnel release     bilateral     S/P hardware removal 01/2014    still with lingering foot pain     S/P shoulder surgery     bilateral     Septic joint (H)     right knee     Septic joint of right knee joint (H)      Venous insufficiency      Venous insufficiency      Venous thrombosis 1999    right axillary vein     Past Surgical History:   Procedure Laterality Date     AMPUTATE TOE(S)  03/15/2012    Procedure:AMPUTATE TOE(S); Surgeon:RUBEN MOSES; Location: OR     AMPUTATE TOE(S)       APPENDECTOMY  1972     APPENDECTOMY       ARTHRODESIS FOOT  03/15/2012    Procedure:ARTHRODESIS FOOT; RIGHT TRIPLE ARTHRODESIS, FIFTH TOE AMPUTATION, LATERAL LIGAMENT RECONSTRUCTION, TENDON TRANSFER AND RELEASE [MINI C-ARM, ARTHREX 4.5 AND 6.7 STAPLES, BIOCOMPOSITE TENODESIS SCREWS]; Surgeon:RUBEN MOSES; Location:[facility] OR     ARTHRODESIS FOOT Right     Right foot triple arthrodesis and removal of hardware     ARTHROSCOPY SHOULDER  06/25/2015    REVISION SUBACROMIAL DECOMPRESSION, EXCISION OF GANGLION CYST, DEBRIDEMENT AND EXCISION OF THE GLENOHUMERAL JOINT GANGLION CYST, CORACOID DECOMPRESSION, POSSIBLE SUBSCAPULARIS REPAIR AND OPEN SUBSCAPULARIS BICEP     ARTHROSCOPY SHOULDER ROTATOR CUFF REPAIR       BIOPSY  Thyroid 2002     BLADDER SURGERY       BREAST SURGERY  Biopsy     CHOLECYSTECTOMY       CHOLECYSTECTOMY       COLONOSCOPY  2018     COLOSTOMY  02/07/2012     Procedure:COLOSTOMY; CREATION OF SIGMOID COLOSTOMY AND EXTENSIVE  LYSIS OF ADHESIONS; Surgeon:MONTSERRAT BENDER; Location:[facility] OR     COLOSTOMY       CYSTOSCOPY       EYE SURGERY       GENITOURINARY SURGERY  1999     GI SURGERY      weakened rectal sphincter with artificial stimulator     HERNIA REPAIR  1976     HYSTERECTOMY TOTAL ABDOMINAL       LAPAROTOMY, LYSIS ADHESIONS, COMBINED  02/07/2012    Procedure:COMBINED LAPAROTOMY, LYSIS ADHESIONS; Surgeon:MONTSERRAT BENDER; Location:[facility] OR     RELEASE CARPAL TUNNEL       RELEASE TENDON FOOT  03/15/2012    Procedure:RELEASE TENDON FOOT; Surgeon:SUKHDEEP METZ; Location:[facility] OR     REMOVE HARDWARE FOOT  12/13/2012    Procedure: REMOVE HARDWARE FOOT;  RIGHT FOOT REMOVAL OF HARDWARE;  Surgeon: Sukhdeep Metz MD;  Location: [facility] OR     SHOULDER SURGERY  11/12/2020    LEFT SHOULDER HEMIARHTROPLASTY, BICEP TENODESIS     SOFT TISSUE SURGERY  2018, 2020     TENDON RELEASE      foot     THYROIDECTOMY       ZZC FREEING BOWEL ADHESION,ENTEROLYSIS      1986, 1996, 1999     ZZC NONSPECIFIC PROCEDURE      throidectomy     ZZC TOTAL ABDOM HYSTERECTOMY  1980    + BSO     Current Outpatient Medications   Medication Sig Dispense Refill     acetaminophen (TYLENOL) 500 MG tablet Take 1,000 mg by mouth       acetaminophen-caffeine (EXCEDRIN TENSION HEADACHE) 500-65 MG TABS Take 2 tablets by mouth every 6 hours as needed for mild pain 90 tablet 0     albuterol (PROAIR HFA/PROVENTIL HFA/VENTOLIN HFA) 108 (90 Base) MCG/ACT inhaler albuterol sulfate HFA 90 mcg/actuation aerosol inhaler       amoxicillin (AMOXIL) 500 MG capsule Takes 4 caps before every dental appointments.       aspirin (ASA) 81 MG EC tablet Take 81 mg by mouth daily       azelastine (ASTEPRO) 0.15 % nasal spray 1 spray       carvedilol (COREG) 12.5 MG tablet Take 1 tablet (12.5 mg) by mouth 2 times daily (with meals) 180 tablet 3     chlorthalidone (HYGROTON) 25 MG tablet Take 0.5 tablets (12.5 mg) by mouth  daily 15 tablet 0     clotrimazole (LOTRIMIN) 1 % cream Apply topically daily       Continuous Blood Gluc Sensor (FREESTYLE BRANDY 3 SENSOR) MISC 1 each every 14 days 6 each 3     diphenhydrAMINE-acetaminophen (TYLENOL PM)  MG tablet Take 1 tablet by mouth nightly as needed for sleep Reported on 3/20/2017       empagliflozin (JARDIANCE) 10 MG TABS tablet Take 1 tablet (10 mg) by mouth daily 90 tablet 1     ezetimibe (ZETIA) 10 MG tablet Take 1 tablet (10 mg) by mouth daily 90 tablet 3     famotidine (PEPCID) 20 MG tablet Take 2 tablets (40 mg) by mouth as needed 180 tablet 3     fenofibrate (TRICOR) 145 MG tablet Take 1 tablet (145 mg) by mouth daily 90 tablet 3     ferrous sulfate (IRON) 325 (65 FE) MG tablet Take 325 mg by mouth daily (with breakfast)       fexofenadine (ALLEGRA) 180 MG tablet Take 180 mg by mouth daily. 120 0     fluocinolone acetonide (DERMA SMOOTHE/FS BODY) 0.01 % external oil Apply 2 mL to scalp once per week. Massage into scalp. Can be left in overnight or washed out after 4-6 hours. 118.28 mL 5     fluticasone (FLONASE) 50 MCG/ACT spray Spray 2 sprays in nostril daily 2 sprays in each nostril qd 1 Bottle 0     furosemide (LASIX) 20 MG tablet Take 1 tablet (20 mg) by mouth daily as needed (lower extremity edema) 90 tablet 3     glipiZIDE (GLUCOTROL) 5 MG tablet Take by mouth every 12 hours       Icosapent Ethyl 1 g CAPS Take 1 g by mouth 2 times daily 60 capsule 11     insulin aspart (NOVOLOG PEN) 100 UNIT/ML pen Inject 5 Units Subcutaneous 3 times daily (with meals) 15 mL 3     insulin glargine (BASAGLAR KWIKPEN) 100 UNIT/ML pen Inject 25 Units Subcutaneous daily 15 mL 4     insulin pen needle (B-D U/F) 31G X 5 MM miscellaneous Use 1 pen needles daily or as directed. 90 each 3     lisinopril (ZESTRIL) 40 MG tablet Take 1 tablet (40 mg) by mouth daily 90 tablet 3     MULTIPLE VITAMIN PO Take 1 tablet by mouth       nitroGLYcerin (NITROSTAT) 0.4 MG sublingual tablet Place 1 tablet (0.4  mg) under the tongue every 5 minutes as needed for chest pain (no more than 3 in one hour; after 3rd, call 911.) 25 tablet 3     nystatin (MYCOSTATIN) cream Apply topically daily as needed        nystatin-triamcinolone (MYCOLOG II) cream Apply topically daily as needed        ondansetron (ZOFRAN) 4 MG tablet Take 1 tablet (4 mg) by mouth every 6 hours as needed Reported on 3/20/2017 20 tablet 0     order for DME Equipment being ordered: Compression stockings - Knee High; 20-30 mmHg compression - note would like adhesive band to keep the stocking from sliding down 3 each 0     pantoprazole (PROTONIX) 40 MG EC tablet Take 40 mg by mouth 2 times daily       polyethylene glycol 400 (BLINK TEARS) 0.25 % SOLN ophthalmic solution        sucralfate (CARAFATE) 1 GM/10ML suspension Take 1 g by mouth 4 times daily       SYNTHROID 112 MCG tablet TAKE ONE-HALF TABLET BY MOUTH DAILY ON FRIDAYS AND TAKE ONE TABLET BY MOUTH ALL OTHER DAYS AS DIRECTED 90 tablet 3     tolterodine ER (DETROL LA) 4 MG 24 hr capsule Take 1 capsule (4 mg) by mouth daily 30 capsule 11     tretinoin (RETIN-A) 0.025 % external cream Use every night as tolerated - spot treat lesion 20 g 3     triamcinolone (KENALOG) 0.1 % external ointment Apply topically every other day 80 g 3     Vitamin D3 50 mcg (2000 units) tablet 1 tablet daily         Allergies   Allergen Reactions     Ketorolac Tromethamine Difficulty breathing     Shortness of breath     Nsaids Difficulty breathing     Increased creatinine     Celecoxib Itching and Rash     Morphine And Related Itching     With higher doses     Crestor [Rosuvastatin] Muscle Pain (Myalgia)     No Clinical Screening - See Comments Itching     Fragrance     Vioxx Other (See Comments)     Heart races     Conjugated Estrogens Rash     Sulfa Antibiotics Rash        Social History     Tobacco Use     Smoking status: Never     Smokeless tobacco: Never   Substance Use Topics     Alcohol use: Never     Family History    Problem Relation Age of Onset     Arthritis Mother      Hypertension Mother      Cerebrovascular Disease Mother      Obesity Mother      Heart Disease Mother         MI's     Hypertension Father      Respiratory Father         Adult RDS     Diabetes Father      Arthritis Sister      Cancer Sister      Diabetes Sister      Hypertension Sister      Breast Cancer Sister      Depression Sister      Thyroid Disease Sister      Obesity Sister      Arthritis Sister      Hypertension Sister      Thyroid Disease Sister      Osteoporosis Sister      Obesity Sister      Cancer Sister         colon polup     Hypertension Sister      Osteoporosis Sister      Obesity Sister      Colon Cancer Sister      Lipids Sister      Obesity Sister      Obesity Maternal Grandmother         Dad s mother     Skin Cancer Maternal Grandmother         skin cancer unknown     Cancer Maternal Grandmother         unknown skin cancer on face     Obesity Paternal Grandmother         Mothers mother     Heart Disease Brother         MI at 54     Other Cancer Brother         Lung & bone     Lipids Brother      Hypertension Brother      Diabetes Brother      Hyperlipidemia Brother      Ovarian Cancer No family hx of      History   Drug Use Unknown         Objective     BP (!) 181/82 (BP Location: Left arm, Patient Position: Sitting, Cuff Size: Adult Regular)   Pulse 64   Temp 97.5  F (36.4  C) (Tympanic)   Resp 17   Ht 1.524 m (5')   Wt 96.3 kg (212 lb 6.4 oz)   SpO2 100%   BMI 41.48 kg/m      Physical Exam    GENERAL APPEARANCE: healthy, alert and no distress     EYES: EOMI,  PERRL     NECK: no adenopathy, no asymmetry, masses, or scars and thyroid normal to palpation     RESP: lungs clear to auscultation - no rales, rhonchi or wheezes     CV: regular rates and rhythm, normal S1 S2, no S3 or S4 and no murmur,       ABDOMEN:  Obese, soft, nontender, no HSM or masses and bowel sounds normal     MS: extremities normal- limited range of motion of  bilateral shoulders, trace edema bilatearlly, brace on right ankle     SKIN: no suspicious lesions or rashes     NEURO: Normal strength and tone, sensory exam grossly normal, mentation intact and speech normal     PSYCH: mentation appears normal. and affect normal/bright     LYMPHATICS: No cervical adenopathy    Recent Labs   Lab Test 05/19/23  1148 04/12/23  1123 03/09/23  1103 02/03/23  1207 01/20/23  1225 12/07/22  1713 12/07/22  1355   HGB  --   --  11.9  --  12.2   < >  --    PLT  --   --  257  --  252   < >  --     139 143   < > 142   < >  --    POTASSIUM 4.9 4.7 5.4*   < > 4.8   < >  --    CR 1.18* 1.31* 1.31*   < > 1.58*   < >  --    A1C  --   --  7.1*  --   --   --  7.0*    < > = values in this interval not displayed.        Diagnostics:  Recent Results (from the past 24 hour(s))   Hemoglobin A1c    Collection Time: 06/28/23 12:03 PM   Result Value Ref Range    Hemoglobin A1C 7.9 (H) 0.0 - 5.6 %   CBC with platelets    Collection Time: 06/28/23 12:03 PM   Result Value Ref Range    WBC Count 8.1 4.0 - 11.0 10e3/uL    RBC Count 3.85 3.80 - 5.20 10e6/uL    Hemoglobin 11.7 11.7 - 15.7 g/dL    Hematocrit 35.5 35.0 - 47.0 %    MCV 92 78 - 100 fL    MCH 30.4 26.5 - 33.0 pg    MCHC 33.0 31.5 - 36.5 g/dL    RDW 11.8 10.0 - 15.0 %    Platelet Count 215 150 - 450 10e3/uL      EKG 12/2022: appears normal, NSR, normal axis, normal intervals, no acute ST/T changes c/w ischemia, no LVH by voltage criteria, unchanged from previous tracings    Revised Cardiac Risk Index (RCRI):  The patient has the following serious cardiovascular risks for perioperative complications:   - Coronary Artery Disease (MI, positive stress test, angina, Qs on EKG) = 1 point   - Diabetes Mellitus (on Insulin) = 1 point     RCRI Interpretation: 2 points: Class III (moderate risk - 6.6% complication rate)     Estimated Functional Capacity: Performs 4 METS exercise without symptoms (e.g., light housework, stairs, 4 mph walk, 7 mph bike, slow  step dance)  Patient Instructions   (Z01.818) Preop general physical exam  (primary encounter diagnosis)  Comment: you are medically optimized for your upcoming surgery pending labs today.  Recent EKG reviewed and stable.  Hold all supplements and aspirin for 1 week leading up to surgery.  Hold Jardiance x 3 days prior to surgery.  Hold novolog on the morning of surgery.  Give only 16 units of long acting basaglar insulin on the day of surgery.  Hold lisinopril, chlorthalidone and furosemide on the day of surgery.  Continue carvedilol   Plan: CBC with platelets            (E11.21) Diabetic nephropathy associated with type 2 diabetes mellitus (H)  Comment: noted recenf follow up in endocrinology and we will check labs today with follow up    Plan:     (G47.33) ANNETTE (obstructive sleep apnea)  Comment: Conitnue use of CPAP  Plan:     (I10) Benign essential hypertension  Comment: blood pressure is elevated today, but quite fluctuant and we will monitor closely   Plan:     (I25.10) Coronary artery disease involving native heart without angina pectoris, unspecified vessel or lesion type  Comment: Hold aspirin, continue statin and beta blocker   Plan:     (R60.0) Edema of lower extremity  Comment: Doing well - infrequent need for lasix  Plan:     (Z86.718) History of DVT (deep vein thrombosis)  Comment: Noted in history - not taking any anticoagulation at this time.  Plan:     (K66.0) Abdominal adhesions  Comment: Plan for surgery   Plan:     (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)  Comment: doing well.  No issues  Plan:              Signed Electronically by: Shola Benoit MD, MD  Copy of this evaluation report is provided to requesting physician.

## 2023-06-28 NOTE — RESULT ENCOUNTER NOTE
Hello -    Here are my comments about the recent results. Rising-- glad we added your short acting insulin. Check again before our next visit    Please let us know if you have any questions or concerns.    Regards,  Odette Weston MD

## 2023-06-29 ENCOUNTER — DOCUMENTATION ONLY (OUTPATIENT)
Dept: SLEEP MEDICINE | Facility: CLINIC | Age: 78
End: 2023-06-29
Payer: MEDICARE

## 2023-06-29 ENCOUNTER — TELEPHONE (OUTPATIENT)
Dept: CARDIOLOGY | Facility: CLINIC | Age: 78
End: 2023-06-29

## 2023-06-29 ENCOUNTER — OFFICE VISIT (OUTPATIENT)
Dept: CARDIOLOGY | Facility: CLINIC | Age: 78
End: 2023-06-29
Attending: NURSE PRACTITIONER
Payer: MEDICARE

## 2023-06-29 VITALS
SYSTOLIC BLOOD PRESSURE: 150 MMHG | OXYGEN SATURATION: 100 % | BODY MASS INDEX: 41.99 KG/M2 | DIASTOLIC BLOOD PRESSURE: 68 MMHG | HEIGHT: 60 IN | WEIGHT: 213.9 LBS | HEART RATE: 68 BPM

## 2023-06-29 DIAGNOSIS — E11.9 TYPE 2 DIABETES, HBA1C GOAL < 7% (H): ICD-10-CM

## 2023-06-29 DIAGNOSIS — I10 ESSENTIAL HYPERTENSION: ICD-10-CM

## 2023-06-29 DIAGNOSIS — G47.33 OBSTRUCTIVE SLEEP APNEA (ADULT) (PEDIATRIC): Primary | ICD-10-CM

## 2023-06-29 DIAGNOSIS — R60.0 EDEMA OF LOWER EXTREMITY: ICD-10-CM

## 2023-06-29 DIAGNOSIS — E78.2 MIXED HYPERLIPIDEMIA: ICD-10-CM

## 2023-06-29 DIAGNOSIS — R00.2 PALPITATIONS: ICD-10-CM

## 2023-06-29 DIAGNOSIS — I10 BENIGN ESSENTIAL HYPERTENSION: Primary | ICD-10-CM

## 2023-06-29 LAB
CREAT UR-MCNC: 76 MG/DL
DEPRECATED CALCIDIOL+CALCIFEROL SERPL-MC: 62 UG/L (ref 20–75)
MICROALBUMIN UR-MCNC: <12 MG/L
MICROALBUMIN/CREAT UR: NORMAL MG/G{CREAT}

## 2023-06-29 PROCEDURE — 99214 OFFICE O/P EST MOD 30 MIN: CPT | Performed by: NURSE PRACTITIONER

## 2023-06-29 RX ORDER — CHLORTHALIDONE 25 MG/1
12.5 TABLET ORAL EVERY OTHER DAY
Qty: 45 TABLET | Refills: 3
Start: 2023-06-29 | End: 2023-09-29

## 2023-06-29 RX ORDER — OLMESARTAN MEDOXOMIL 40 MG/1
40 TABLET ORAL DAILY
Qty: 30 TABLET | Refills: 3 | Status: SHIPPED | OUTPATIENT
Start: 2023-06-29 | End: 2023-11-02

## 2023-06-29 NOTE — PROGRESS NOTES
Patient was offered choice of vendor and chose [facility].  Patient Chasity Hodgson was set up at [place] on June 29, 2023. Patient received a Resmed Airsense 11 Pressures were set at 5-15 cm H2O.   Patient s ramp is 5 cm H2O for Auto and FLEX/EPR is EPR, 2.  Patient received a Resmed Mask name: [id number]   Nasal mask size For Her, heated tubing and heated humidifier.  Patient has the following compliance requirements: using and visit requirements  Patient has a follow up on 9/20/2023 with ANA WERNER..    Rachell Gold

## 2023-06-29 NOTE — TELEPHONE ENCOUNTER
[facility] Call [facility]    Phone Message    May a detailed message be left on voicemail: yes     Reason for Call: Other: Pt would like a call back as she had a bunch of labs done yesterday and is wondering if she needs to take this bmp today     Action Taken: Message routed to:  [facility] ([facility]): Cardio    Travel Screening: Not Applicable

## 2023-06-29 NOTE — PATIENT INSTRUCTIONS
Thanks for participating in a office visit with the [facility] today.    Blood pressure suboptimal. Switch lisinopril to olmesartan 40 mg daily.   Decrease chlorthalidone to every other day  Continue all other medications  Encourage exercise, weight loss, and low salt diet.     Follow up in 3 months with [facility] or sooner if needed      Please call my nurse at  [phone] with any questions or concerns.    Scheduling phone number: [phone]  Reminder: Please bring in all current medications, over the counter supplements and vitamin bottles to your next appointment.

## 2023-06-29 NOTE — TELEPHONE ENCOUNTER
Labs drawn yesterday at PCP office. CMP drawn yesterday. No need to repeat BMP today. Lab appointment cancelled.  Estrellita HARRISON RN  06/29/23 at 8:15 AM

## 2023-06-29 NOTE — RESULT ENCOUNTER NOTE
Gerard Gaspar,    I have had the opportunity to review your recent results and an interpretation is as follows:  Stable white blood cell count and hemoglobin      Sincerely,  Shola Benoit MD

## 2023-06-29 NOTE — LETTER
6/29/2023    Shola Benoit MD, MD  [address]  [facility] [address]    RE: Chasity Hodgson       Dear Colleague,     I had the pleasure of seeing Chasity Hodgson in the [facility].  Cardiology Clinic Progress Note  Chasity Hodgson MRN# [id number]   YOB: 1945 Age: 77 year old     Reason For Visit:  6 week follow up   Primary Cardiologist:   Dr. Bhatia           History of Presenting Illness:      Chasity Hodgson is a pleasant 77 year old patient who carries a past medical history significant for nonocclusive coronary artery disease, chronic kidney disease, hypertension, PAD, DVT, diabetes, hyperlipidemia, hypothyroidism, colostomy obstructive sleep apnea, and childhood polio.    She was last seen on 5/22/2023 in follow-up to episodes of chest pain and palpitations.  Please refer to that office visit for more complete HPI.  She underwent a nuclear stress test that was negative for inducible myocardial ischemia or infarction.  She also wore a 3 day Zio Patch monitor that showed rare PACs/PVCs and no other significant arrhythmias. Echocardiogram (3/2023) showed a normal ejection fraction estimated at 65 to 70%, normal RV size and function, no significant valvular disease.  At that office visit, she was noted to be hypertensive and was initiated on low-dose chlorthalidone.  Unfortunately, this has caused new since urination and she has elected to decrease the frequency to a few times a week.  Blood pressures consistently run in the 140s to 160s systolic. Today, her blood pressure was 188/70 with repeat reading reduced to 150/68.      She does not endorse any ischemic symptoms including chest pain, shortness of breath, palpitations, PND, orthopnea, presyncope, or syncope.  She has chronic bilateral lower extremity edema.  However, this is moderately improved with chlorthalidone.  She has PRN Lasix which she uses infrequently.  She is scheduled to undergo colorectal surgery due  to anal/rectal stenosis.  She was seen yesterday by her PCP for a preoperative clearance.    Labs yesterday showed a sodium of 135, potassium 5.3, BUN 26.3, creatinine 1.2, and GFR 47  CBC was unremarkable  TSH 5.66  A1c 7.9  Lipid panel showed a total cholesterol of 172, HDL 32, LDL 83, and triglycerides 284    She does not engage in any routine exercise.  Walks with a wheeled walker.   Remains compliant with all medications.                   Assessment and Plan:     1.  Hypertension - Suboptimal . Switch lisinopril to Olmesartan 40 mg daily. Ok to decrease chlorthalidone to every other day, continue carvedilol.  Follow up BMP in 2 weeks ( ok to get with pre op labs).   2.  Nonocclusive coronary artery disease -  nuclear stress test was negative for inducible myocardial ischemia or infarction. Echocardiogram (3/2023) showed a normal ejection fraction estimated at 65 to 70%, normal RV size and function, no significant valvular disease. Continue current medical therapy. Encourage exercise and weight loss.   3.  Palpitations-  No recurrence. 3-day Zio patch monitor showed rare PAC/PVC's, no significant arrhythmias.   4. Leg edema - Continue chlorthalidone and PRN lasix. Encourage compression stockings or leg wrap and elevation.   5. Hyperlipidemia -statin intolerance, on ezetimibe and fenofibrate  6.  Diabetes mellitus -A1c 7.9, managed by endocrinology           Thank you for allowing me to participate in this delightful patient's care. I have recommended she follow up in 3 months with [facility] or sooner if needed.       Amber Chan, MAGALY CNP         Review of Systems:     Review of Systems:  Skin:        Eyes:       ENT:       Respiratory:  Negative    Cardiovascular:    chest pain;Positive for;edema;fatigue;lightheadedness;palpitations  Gastroenterology:      Genitourinary:  Positive for incontinence  Musculoskeletal:       Neurologic:  Positive for headaches  Psychiatric:  Positive for excessive  stress  Heme/Lymph/Imm:       Endocrine:                   Physical Exam:     GEN:  In general, this is a obese female in no acute distress.  HEENT:  Pupils equal, round. Sclerae nonicteric. Clear oropharynx. Mucous membranes moist.  NECK: Supple, no masses appreciated. Trachea midline.  No JVD   C/V:  Regular rate and rhythm, no murmur, rub or gallop. No S3 or RV heave.   RESP: Respirations are unlabored. No use of accessory muscles. Clear to auscultation bilaterally without wheezing, rales, or rhonchi.  GI: Abdomen soft, nontender, nondistended. No HSM appreciated.   EXTREM: Trace to 1+ bilateral LE edema. No cyanosis or clubbing.  NEURO: Alert and oriented, cooperative. Gait steady with wheeled walker. No obvious focal deficits.   PSYCH: Normal affect.  SKIN: Warm and dry. No rashes or petechiae appreciated.          Past Medical History:     Past Medical History:   Diagnosis Date    Abdominal adhesions 1984, 96,99    s/p lysis    Abdominal adhesions     Acne rosacea     Allergic rhinitis     Allergic rhinitis, cause unspecified     Alopecia     Anemia     CAD (coronary artery disease)     Carotid stenosis     CKD (chronic kidney disease) stage 2, GFR 60-89 ml/min     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)     CPAP (continuous positive airway pressure) dependence     Depression     Diabetic gastroparesis (H)     Diet-controlled type 2 diabetes mellitus (H)     DM2 (diabetes mellitus, type 2) (H)     DVT (deep venous thrombosis) (H)     DVT of axillary vein, acute right (H)     Fibromyalgia     Gastro-oesophageal reflux disease     GERD (gastroesophageal reflux disease)     Hernia of unspecified site of abdominal cavity without mention of obstruction or gangrene     bilateral    Hernia, abdominal     History of blood transfusion     10/ 1980    History of thrombophlebitis     HTN (hypertension)     HTN (hypertension)     Hypertriglyceridemia     Hypertriglyceridemia     Hypokalemia     Hypothyroidism     Mumps     Obstructive  sleep apnea     ANNETTE (obstructive sleep apnea)     ANNETTE on CPAP     Osteoarthritis of glenohumeral joint     Papillary carcinoma of thyroid (H)     s/p thyroidectomy - Ruegemer    Papillary carcinoma of thyroid (H)     PE (pulmonary embolism)     Poliomyelitis     poor circulation right leg    Poliomyelitis     Postsurgical hypothyroidism     s/p papillary thryoid cancer - Ruegemer    Pulmonary embolism (H)     Pulmonary embolus (H)     Pulmonary nodule     Rosacea     S/P carpal tunnel release     bilateral    S/P hardware removal 01/2014    still with lingering foot pain    S/P shoulder surgery     bilateral    Septic joint (H)     right knee    Septic joint of right knee joint (H)     Venous insufficiency     Venous insufficiency     Venous thrombosis 1999    right axillary vein              Past Surgical History:     Past Surgical History:   Procedure Laterality Date    AMPUTATE TOE(S)  03/15/2012    Procedure:AMPUTATE TOE(S); Surgeon:RUBEN MOSES; Location: OR    AMPUTATE TOE(S)      APPENDECTOMY  1972    APPENDECTOMY      ARTHRODESIS FOOT  03/15/2012    Procedure:ARTHRODESIS FOOT; RIGHT TRIPLE ARTHRODESIS, FIFTH TOE AMPUTATION, LATERAL LIGAMENT RECONSTRUCTION, TENDON TRANSFER AND RELEASE [MINI C-ARM, ARTHREX 4.5 AND 6.7 STAPLES, BIOCOMPOSITE TENODESIS SCREWS]; Surgeon:RUBEN MOSES; Location:[facility] OR    ARTHRODESIS FOOT Right     Right foot triple arthrodesis and removal of hardware    ARTHROSCOPY SHOULDER  06/25/2015    REVISION SUBACROMIAL DECOMPRESSION, EXCISION OF GANGLION CYST, DEBRIDEMENT AND EXCISION OF THE GLENOHUMERAL JOINT GANGLION CYST, CORACOID DECOMPRESSION, POSSIBLE SUBSCAPULARIS REPAIR AND OPEN SUBSCAPULARIS BICEP    ARTHROSCOPY SHOULDER ROTATOR CUFF REPAIR      BIOPSY  Thyroid 2002    BLADDER SURGERY      BREAST SURGERY  Biopsy    CHOLECYSTECTOMY      CHOLECYSTECTOMY      COLONOSCOPY  2018    COLOSTOMY  02/07/2012    Procedure:COLOSTOMY; CREATION OF SIGMOID COLOSTOMY AND  EXTENSIVE  LYSIS OF ADHESIONS; Surgeon:MONTSERRAT BENDER; Location: OR    COLOSTOMY      CYSTOSCOPY      EYE SURGERY      GENITOURINARY SURGERY  1999    GI SURGERY      weakened rectal sphincter with artificial stimulator    HERNIA REPAIR  1976    HYSTERECTOMY TOTAL ABDOMINAL      LAPAROTOMY, LYSIS ADHESIONS, COMBINED  02/07/2012    Procedure:COMBINED LAPAROTOMY, LYSIS ADHESIONS; Surgeon:MONTSERRAT BENDER; Location:[facility] OR    RELEASE CARPAL TUNNEL      RELEASE TENDON FOOT  03/15/2012    Procedure:RELEASE TENDON FOOT; Surgeon:SUKHDEEP METZ; Location: OR    REMOVE HARDWARE FOOT  12/13/2012    Procedure: REMOVE HARDWARE FOOT;  RIGHT FOOT REMOVAL OF HARDWARE;  Surgeon: Sukhdeep Metz MD;  Location:  OR    SHOULDER SURGERY  11/12/2020    LEFT SHOULDER HEMIARHTROPLASTY, BICEP TENODESIS    SOFT TISSUE SURGERY  2018, 2020    TENDON RELEASE      foot    THYROIDECTOMY      ZZC FREEING BOWEL ADHESION,ENTEROLYSIS      1986, 1996, 1999    ZZC NONSPECIFIC PROCEDURE      throidectomy    ZZC TOTAL ABDOM HYSTERECTOMY  1980    + BSO              Allergies:   Ketorolac tromethamine, Nsaids, Celecoxib, Morphine and related, Crestor [rosuvastatin], No clinical screening - see comments, Vioxx, Conjugated estrogens, and Sulfa antibiotics       Data:   All laboratory data reviewed:    LAST CHOLESTEROL:  Lab Results   Component Value Date    CHOL 155 03/09/2023    CHOL 196 03/29/2019     Lab Results   Component Value Date    HDL 39 03/09/2023    HDL 27 03/29/2019     Lab Results   Component Value Date    LDL 79 03/09/2023    LDL 99 03/29/2019     Lab Results   Component Value Date    TRIG 183 03/09/2023    TRIG 349 03/29/2019     Lab Results   Component Value Date    CHOLHDLRATIO 4.6 02/15/2017    CHOLHDLRATIO 4.6 03/31/2015       LAST BMP:  Lab Results   Component Value Date     03/09/2023     07/07/2021      Lab Results   Component Value Date    POTASSIUM 5.4 03/09/2023    POTASSIUM 5.0 10/12/2022     POTASSIUM 4.7 07/07/2021     Lab Results   Component Value Date    CHLORIDE 108 03/09/2023    CHLORIDE 109 10/12/2022    CHLORIDE 107 07/07/2021     Lab Results   Component Value Date    RINKU 9.4 03/09/2023    RINKU 9.4 07/07/2021     Lab Results   Component Value Date    CO2 24 03/09/2023    CO2 23 10/12/2022    CO2 24 07/07/2021     Lab Results   Component Value Date    BUN 37.3 03/09/2023    BUN 41 10/12/2022    BUN 27 07/07/2021     Lab Results   Component Value Date    CR 1.31 03/09/2023    CR 1.01 07/07/2021     Lab Results   Component Value Date     03/09/2023     10/12/2022     07/07/2021       LAST CBC:  Lab Results   Component Value Date    WBC 9.5 03/09/2023    WBC 7.2 04/26/2021     Lab Results   Component Value Date    RBC 4.04 03/09/2023    RBC 3.77 04/26/2021     Lab Results   Component Value Date    HGB 11.9 03/09/2023    HGB 11.7 04/26/2021     Lab Results   Component Value Date    HCT 37.3 03/09/2023    HCT 35.0 04/26/2021     Lab Results   Component Value Date    MCV 92 03/09/2023    MCV 93 04/26/2021     Lab Results   Component Value Date    MCH 29.5 03/09/2023    MCH 31.0 04/26/2021     Lab Results   Component Value Date    MCHC 31.9 03/09/2023    MCHC 33.4 04/26/2021     Lab Results   Component Value Date    RDW 12.8 03/09/2023    RDW 12.5 04/26/2021     Lab Results   Component Value Date     03/09/2023     04/26/2021               Thank you for allowing me to participate in the care of your patient.      Sincerely,     MAGALY Titus CNP     [facility]  cc:   MAGALY Titus CNP  [id number] [place] [address]

## 2023-06-29 NOTE — PROGRESS NOTES
Cardiology Clinic Progress Note  Chasity Hodgson MRN# [id number]   YOB: 1945 Age: 77 year old     Reason For Visit:  6 week follow up   Primary Cardiologist:   Dr. Bhatia           History of Presenting Illness:      Chasity Hodgson is a pleasant 77 year old patient who carries a past medical history significant for nonocclusive coronary artery disease, chronic kidney disease, hypertension, PAD, DVT, diabetes, hyperlipidemia, hypothyroidism, colostomy obstructive sleep apnea, and childhood polio.    She was last seen on 5/22/2023 in follow-up to episodes of chest pain and palpitations.  Please refer to that office visit for more complete HPI.  She underwent a nuclear stress test that was negative for inducible myocardial ischemia or infarction.  She also wore a 3 day Zio Patch monitor that showed rare PACs/PVCs and no other significant arrhythmias. Echocardiogram (3/2023) showed a normal ejection fraction estimated at 65 to 70%, normal RV size and function, no significant valvular disease.  At that office visit, she was noted to be hypertensive and was initiated on low-dose chlorthalidone.  Unfortunately, this has caused new since urination and she has elected to decrease the frequency to a few times a week.  Blood pressures consistently run in the 140s to 160s systolic. Today, her blood pressure was 188/70 with repeat reading reduced to 150/68.      She does not endorse any ischemic symptoms including chest pain, shortness of breath, palpitations, PND, orthopnea, presyncope, or syncope.  She has chronic bilateral lower extremity edema.  However, this is moderately improved with chlorthalidone.  She has PRN Lasix which she uses infrequently.  She is scheduled to undergo colorectal surgery due to anal/rectal stenosis.  She was seen yesterday by her PCP for a preoperative clearance.    Labs yesterday showed a sodium of 135, potassium 5.3, BUN 26.3, creatinine 1.2, and GFR 47  CBC was unremarkable  TSH  5.66  A1c 7.9  Lipid panel showed a total cholesterol of 172, HDL 32, LDL 83, and triglycerides 284    She does not engage in any routine exercise.  Walks with a wheeled walker.   Remains compliant with all medications.                   Assessment and Plan:     1.  Hypertension - Suboptimal . Switch lisinopril to Olmesartan 40 mg daily. Ok to decrease chlorthalidone to every other day, continue carvedilol.  Follow up BMP in 2 weeks ( ok to get with pre op labs).   2.  Nonocclusive coronary artery disease -  nuclear stress test was negative for inducible myocardial ischemia or infarction. Echocardiogram (3/2023) showed a normal ejection fraction estimated at 65 to 70%, normal RV size and function, no significant valvular disease. Continue current medical therapy. Encourage exercise and weight loss.   3.  Palpitations-  No recurrence. 3-day Zio patch monitor showed rare PAC/PVC's, no significant arrhythmias.   4. Leg edema - Continue chlorthalidone and PRN lasix. Encourage compression stockings or leg wrap and elevation.   5. Hyperlipidemia -statin intolerance, on ezetimibe and fenofibrate  6.  Diabetes mellitus -A1c 7.9, managed by endocrinology           Thank you for allowing me to participate in this delightful patient's care. I have recommended she follow up in 3 months with [facility] or sooner if needed.       MAGALY Titus CNP         Review of Systems:     Review of Systems:  Skin:        Eyes:       ENT:       Respiratory:  Negative    Cardiovascular:    chest pain;Positive for;edema;fatigue;lightheadedness;palpitations  Gastroenterology:      Genitourinary:  Positive for incontinence  Musculoskeletal:       Neurologic:  Positive for headaches  Psychiatric:  Positive for excessive stress  Heme/Lymph/Imm:       Endocrine:                   Physical Exam:     GEN:  In general, this is a obese female in no acute distress.  HEENT:  Pupils equal, round. Sclerae nonicteric. Clear oropharynx. Mucous membranes  moist.  NECK: Supple, no masses appreciated. Trachea midline.  No JVD   C/V:  Regular rate and rhythm, no murmur, rub or gallop. No S3 or RV heave.   RESP: Respirations are unlabored. No use of accessory muscles. Clear to auscultation bilaterally without wheezing, rales, or rhonchi.  GI: Abdomen soft, nontender, nondistended. No HSM appreciated.   EXTREM: Trace to 1+ bilateral LE edema. No cyanosis or clubbing.  NEURO: Alert and oriented, cooperative. Gait steady with wheeled walker. No obvious focal deficits.   PSYCH: Normal affect.  SKIN: Warm and dry. No rashes or petechiae appreciated.          Past Medical History:     Past Medical History:   Diagnosis Date     Abdominal adhesions 1984, 96,99    s/p lysis     Abdominal adhesions      Acne rosacea      Allergic rhinitis      Allergic rhinitis, cause unspecified      Alopecia      Anemia      CAD (coronary artery disease)      Carotid stenosis      CKD (chronic kidney disease) stage 2, GFR 60-89 ml/min      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)      CPAP (continuous positive airway pressure) dependence      Depression      Diabetic gastroparesis (H)      Diet-controlled type 2 diabetes mellitus (H)      DM2 (diabetes mellitus, type 2) (H)      DVT (deep venous thrombosis) (H)      DVT of axillary vein, acute right (H)      Fibromyalgia      Gastro-oesophageal reflux disease      GERD (gastroesophageal reflux disease)      Hernia of unspecified site of abdominal cavity without mention of obstruction or gangrene     bilateral     Hernia, abdominal      History of blood transfusion     10/ 1980     History of thrombophlebitis      HTN (hypertension)      HTN (hypertension)      Hypertriglyceridemia      Hypertriglyceridemia      Hypokalemia      Hypothyroidism      Mumps      Obstructive sleep apnea      ANNETTE (obstructive sleep apnea)      ANNETTE on CPAP      Osteoarthritis of glenohumeral joint      Papillary carcinoma of thyroid (H)     s/p thyroidectomy - Ruegemer     Papillary  carcinoma of thyroid (H)      PE (pulmonary embolism)      Poliomyelitis     poor circulation right leg     Poliomyelitis      Postsurgical hypothyroidism     s/p papillary thryoid cancer - Ruegemer     Pulmonary embolism (H)      Pulmonary embolus (H)      Pulmonary nodule      Rosacea      S/P carpal tunnel release     bilateral     S/P hardware removal 01/2014    still with lingering foot pain     S/P shoulder surgery     bilateral     Septic joint (H)     right knee     Septic joint of right knee joint (H)      Venous insufficiency      Venous insufficiency      Venous thrombosis 1999    right axillary vein              Past Surgical History:     Past Surgical History:   Procedure Laterality Date     AMPUTATE TOE(S)  03/15/2012    Procedure:AMPUTATE TOE(S); Surgeon:RUBEN MOSES; Location: OR     AMPUTATE TOE(S)       APPENDECTOMY  1972     APPENDECTOMY       ARTHRODESIS FOOT  03/15/2012    Procedure:ARTHRODESIS FOOT; RIGHT TRIPLE ARTHRODESIS, FIFTH TOE AMPUTATION, LATERAL LIGAMENT RECONSTRUCTION, TENDON TRANSFER AND RELEASE [MINI C-ARM, ARTHREX 4.5 AND 6.7 STAPLES, BIOCOMPOSITE TENODESIS SCREWS]; Surgeon:RUBEN MOSES; Location: OR     ARTHRODESIS FOOT Right     Right foot triple arthrodesis and removal of hardware     ARTHROSCOPY SHOULDER  06/25/2015    REVISION SUBACROMIAL DECOMPRESSION, EXCISION OF GANGLION CYST, DEBRIDEMENT AND EXCISION OF THE GLENOHUMERAL JOINT GANGLION CYST, CORACOID DECOMPRESSION, POSSIBLE SUBSCAPULARIS REPAIR AND OPEN SUBSCAPULARIS BICEP     ARTHROSCOPY SHOULDER ROTATOR CUFF REPAIR       BIOPSY  Thyroid 2002     BLADDER SURGERY       BREAST SURGERY  Biopsy     CHOLECYSTECTOMY       CHOLECYSTECTOMY       COLONOSCOPY  2018     COLOSTOMY  02/07/2012    Procedure:COLOSTOMY; CREATION OF SIGMOID COLOSTOMY AND EXTENSIVE  LYSIS OF ADHESIONS; Surgeon:MONTSERRAT BENDER; Location: OR     COLOSTOMY       CYSTOSCOPY       EYE SURGERY       GENITOURINARY SURGERY  1999      GI SURGERY      weakened rectal sphincter with artificial stimulator     HERNIA REPAIR  1976     HYSTERECTOMY TOTAL ABDOMINAL       LAPAROTOMY, LYSIS ADHESIONS, COMBINED  02/07/2012    Procedure:COMBINED LAPAROTOMY, LYSIS ADHESIONS; Surgeon:MONTSERRAT BENDER; Location:[facility] OR     RELEASE CARPAL TUNNEL       RELEASE TENDON FOOT  03/15/2012    Procedure:RELEASE TENDON FOOT; Surgeon:SUKHDEEP METZ; Location: OR     REMOVE HARDWARE FOOT  12/13/2012    Procedure: REMOVE HARDWARE FOOT;  RIGHT FOOT REMOVAL OF HARDWARE;  Surgeon: Sukhdeep Metz MD;  Location:  OR     SHOULDER SURGERY  11/12/2020    LEFT SHOULDER HEMIARHTROPLASTY, BICEP TENODESIS     SOFT TISSUE SURGERY  2018, 2020     TENDON RELEASE      foot     THYROIDECTOMY       ZZC FREEING BOWEL ADHESION,ENTEROLYSIS      1986, 1996, 1999     ZZC NONSPECIFIC PROCEDURE      throidectomy     ZZC TOTAL ABDOM HYSTERECTOMY  1980    + BSO              Allergies:   Ketorolac tromethamine, Nsaids, Celecoxib, Morphine and related, Crestor [rosuvastatin], No clinical screening - see comments, Vioxx, Conjugated estrogens, and Sulfa antibiotics       Data:   All laboratory data reviewed:    LAST CHOLESTEROL:  Lab Results   Component Value Date    CHOL 155 03/09/2023    CHOL 196 03/29/2019     Lab Results   Component Value Date    HDL 39 03/09/2023    HDL 27 03/29/2019     Lab Results   Component Value Date    LDL 79 03/09/2023    LDL 99 03/29/2019     Lab Results   Component Value Date    TRIG 183 03/09/2023    TRIG 349 03/29/2019     Lab Results   Component Value Date    CHOLHDLRATIO 4.6 02/15/2017    CHOLHDLRATIO 4.6 03/31/2015       LAST BMP:  Lab Results   Component Value Date     03/09/2023     07/07/2021      Lab Results   Component Value Date    POTASSIUM 5.4 03/09/2023    POTASSIUM 5.0 10/12/2022    POTASSIUM 4.7 07/07/2021     Lab Results   Component Value Date    CHLORIDE 108 03/09/2023    CHLORIDE 109 10/12/2022    CHLORIDE 107  07/07/2021     Lab Results   Component Value Date    RINKU 9.4 03/09/2023    RINKU 9.4 07/07/2021     Lab Results   Component Value Date    CO2 24 03/09/2023    CO2 23 10/12/2022    CO2 24 07/07/2021     Lab Results   Component Value Date    BUN 37.3 03/09/2023    BUN 41 10/12/2022    BUN 27 07/07/2021     Lab Results   Component Value Date    CR 1.31 03/09/2023    CR 1.01 07/07/2021     Lab Results   Component Value Date     03/09/2023     10/12/2022     07/07/2021       LAST CBC:  Lab Results   Component Value Date    WBC 9.5 03/09/2023    WBC 7.2 04/26/2021     Lab Results   Component Value Date    RBC 4.04 03/09/2023    RBC 3.77 04/26/2021     Lab Results   Component Value Date    HGB 11.9 03/09/2023    HGB 11.7 04/26/2021     Lab Results   Component Value Date    HCT 37.3 03/09/2023    HCT 35.0 04/26/2021     Lab Results   Component Value Date    MCV 92 03/09/2023    MCV 93 04/26/2021     Lab Results   Component Value Date    MCH 29.5 03/09/2023    MCH 31.0 04/26/2021     Lab Results   Component Value Date    MCHC 31.9 03/09/2023    MCHC 33.4 04/26/2021     Lab Results   Component Value Date    RDW 12.8 03/09/2023    RDW 12.5 04/26/2021     Lab Results   Component Value Date     03/09/2023     04/26/2021

## 2023-06-30 ENCOUNTER — HOSPITAL ENCOUNTER (OUTPATIENT)
Dept: ULTRASOUND IMAGING | Facility: CLINIC | Age: 78
Discharge: HOME | End: 2023-06-30
Attending: INTERNAL MEDICINE
Payer: MEDICARE

## 2023-06-30 ENCOUNTER — HOSPITAL ENCOUNTER (OUTPATIENT)
Dept: MRI IMAGING | Facility: CLINIC | Age: 78
Discharge: HOME | End: 2023-06-30
Attending: INTERNAL MEDICINE
Payer: MEDICARE

## 2023-06-30 DIAGNOSIS — G44.219 EPISODIC TENSION-TYPE HEADACHE, NOT INTRACTABLE: ICD-10-CM

## 2023-06-30 DIAGNOSIS — H53.8 BLURRED VISION: ICD-10-CM

## 2023-06-30 DIAGNOSIS — R51.9 PAIN OF CHEEK: ICD-10-CM

## 2023-06-30 PROCEDURE — 70553 MRI BRAIN STEM W/O & W/DYE: CPT | Mod: MF

## 2023-06-30 PROCEDURE — 76536 US EXAM OF HEAD AND NECK: CPT

## 2023-06-30 PROCEDURE — A9585 GADOBUTROL INJECTION: HCPCS | Performed by: INTERNAL MEDICINE

## 2023-06-30 PROCEDURE — 255N000002 HC RX 255 OP 636: Performed by: INTERNAL MEDICINE

## 2023-06-30 RX ORDER — GADOBUTROL 604.72 MG/ML
9 INJECTION INTRAVENOUS ONCE
Status: COMPLETED | OUTPATIENT
Start: 2023-06-30 | End: 2023-06-30

## 2023-06-30 RX ADMIN — GADOBUTROL 9 ML: 604.72 INJECTION INTRAVENOUS at 13:45

## 2023-07-06 ENCOUNTER — ALLIED HEALTH/NURSE VISIT (OUTPATIENT)
Dept: UROLOGY | Facility: CLINIC | Age: 78
End: 2023-07-06
Attending: PHYSICIAN ASSISTANT
Payer: MEDICARE

## 2023-07-06 VITALS
HEART RATE: 66 BPM | BODY MASS INDEX: 41.82 KG/M2 | WEIGHT: 213 LBS | HEIGHT: 60 IN | SYSTOLIC BLOOD PRESSURE: 162 MMHG | DIASTOLIC BLOOD PRESSURE: 83 MMHG

## 2023-07-06 DIAGNOSIS — N39.46 MIXED INCONTINENCE: Primary | ICD-10-CM

## 2023-07-06 DIAGNOSIS — N32.81 OAB (OVERACTIVE BLADDER): ICD-10-CM

## 2023-07-06 LAB
ALBUMIN UR-MCNC: NEGATIVE MG/DL
APPEARANCE UR: CLEAR
BILIRUB UR QL STRIP: NEGATIVE
COLOR UR AUTO: YELLOW
GLUCOSE UR STRIP-MCNC: NEGATIVE MG/DL
HGB UR QL STRIP: NEGATIVE
KETONES UR STRIP-MCNC: NEGATIVE MG/DL
LEUKOCYTE ESTERASE UR QL STRIP: NEGATIVE
NITRATE UR QL: NEGATIVE
PH UR STRIP: 5 [PH] (ref 5–8)
SP GR UR STRIP: 1.01 (ref 1–1.03)
UROBILINOGEN UR STRIP-ACNC: 0.2 E.U./DL

## 2023-07-06 PROCEDURE — 51797 INTRAABDOMINAL PRESSURE TEST: CPT | Performed by: PHYSICIAN ASSISTANT

## 2023-07-06 PROCEDURE — 99213 OFFICE O/P EST LOW 20 MIN: CPT | Mod: 25 | Performed by: PHYSICIAN ASSISTANT

## 2023-07-06 PROCEDURE — 81003 URINALYSIS AUTO W/O SCOPE: CPT | Performed by: PATHOLOGY

## 2023-07-06 PROCEDURE — 51741 ELECTRO-UROFLOWMETRY FIRST: CPT | Performed by: PHYSICIAN ASSISTANT

## 2023-07-06 PROCEDURE — 81003 URINALYSIS AUTO W/O SCOPE: CPT | Performed by: PHYSICIAN ASSISTANT

## 2023-07-06 PROCEDURE — 51784 ANAL/URINARY MUSCLE STUDY: CPT | Performed by: PHYSICIAN ASSISTANT

## 2023-07-06 PROCEDURE — 51728 CYSTOMETROGRAM W/VP: CPT | Performed by: PHYSICIAN ASSISTANT

## 2023-07-06 RX ORDER — CEPHALEXIN 500 MG/1
500 CAPSULE ORAL ONCE
Status: COMPLETED | OUTPATIENT
Start: 2023-07-06 | End: 2023-07-06

## 2023-07-06 RX ORDER — TROSPIUM CHLORIDE ER 60 MG/1
60 CAPSULE ORAL EVERY MORNING
Qty: 30 CAPSULE | Refills: 11 | Status: SHIPPED | OUTPATIENT
Start: 2023-07-06 | End: 2023-08-25

## 2023-07-06 RX ADMIN — CEPHALEXIN 500 MG: 500 CAPSULE ORAL at 11:34

## 2023-07-06 ASSESSMENT — PAIN SCALES - GENERAL: PAINLEVEL: NO PAIN (0)

## 2023-07-06 NOTE — PATIENT INSTRUCTIONS
UROLOGY CLINIC VISIT PATIENT INSTRUCTIONS    Start trospium ER (Sanctura XR) 60 mg once daily in the morning on an empty stomach.    STOP taking tolterodine (Detrol).    Will have Dr. Zendejas's [place] office reach out to see if your appointment with her can be moved up to August.    If you have any issues, questions or concerns in the meantime, do not hesitate to contact us at [phone] or via [employer].     It was a pleasure meeting with you today.  Thank you for allowing me and my team the privilege of caring for you today.  YOU are the reason we are here, and I truly hope we provided you with the excellent service you deserve.  Please let us know if there is anything else we can do for you so that we can be sure you are leaving completely satisfied with your care experience.

## 2023-07-06 NOTE — Clinical Note
JOSÉ LUIS - I gave her a prescription for trospium ER 60 mg to try in place of tolterodine. She'll be seeing you back in [place] in a few months to recheck. Thanks! Marbella Martinez PA-C

## 2023-07-06 NOTE — PROGRESS NOTES
PREPROCEDURE DIAGNOSES:    1. Mixed urinary incontinence  2. Overactive bladder  3. History of prior urethral bulking  4. History of colostomy with question of rectal prolapse    POSTPROCEDURE DIAGNOSES:  -Small maximum cystometric capacity 190 mL with heightened filling sensations.  -Good bladder compliance with terminal detrusor overactivity with associated urgency incontinence when PDet reaches 26 cm H2O.   -No urodynamic stress incontinence.  -Maximum detrusor contraction during voiding reaches 31 cm H2O. She voids 190 mL with slightly reduced flow rate (Qmax 13 ml/s), continuous flow curve, quiet EMG activity, complete bladder emptying (PVR 0 mL), and no evidence for bladder outlet obstruction (BOOI 1.5).     PROCEDURE:    1. Uroflowmetry.  2. Sterile urethral catheterization for measurement of postvoid residual urine volume.  3. Complex filling cystometrogram with measurement of bladder and rectal pressures.  4. Complex voiding cystometrogram with measurement of bladder and rectal pressures.  5. Electromyography of the pelvic floor during urodynamics.    INDICATIONS FOR PROCEDURE:  Ms. Chasity Hodgson is a pleasant 77 year old female with mixed urinary incontinence, overactive bladder, history of periurethral bulking, and history of colostomy with question of rectal prolapse. Baseline urodynamic assessment is requested today by Dr. Zendejsa to better characterize Ms. Chasity Hodgson's voiding dysfunction.      VOIDING DIARY:  Did not complete.     DESCRIPTION OF PROCEDURE:  Risks, benefits, and alternatives to urodynamics were discussed with the patient and she wished to proceed.  Urodynamics are planned to better assess the primary etiology for Ms. Hdogson's urologic dysfunction.  The patient last took anticholinergic medication within the last 12 hours.  After informed consent was obtained, the patient was taken to the procedure room where uroflowmetry was performed. Findings below.     PRE-STUDY UROFLOWMETRY:  Voided  volume: 91 mL.  Maximum flow rate: 17.9 mL/sec.  Average flow rate: 5.3 mL/sec.  Character of the curve: sawtooth.  Postvoid residual by catheter: 20 mL.  Pretest urine dipstick was negative for leukocytes and nitrites.    Next a 7F double-lumen urodynamics catheter was inserted into the bladder under sterile technique via urethra.  A 7F abdominal manometry catheter was placed in the vagina.  EMG pads were placed on both sides of the anal verge.  The bladder was filled with normal saline at 40 mL/minute and serial pressures were recorded.  With coughing there was an appropriate rise in vesical and abdominal pressures with no change in detrusor pressure, confirming good study catheter placement.    DURING THE FILLING PHASE:  First sensation: 73 mL.  First Desire: 122 mL.  Strong Desire: 164 mL.  Maximum Capacity: 190 mL.    Uninhibited detrusor contractions: terminal DO with associated urgency incontinence when PDet reaches 26 cm H2O.  Compliance: good. PDet low throughout filling.  Continence: terminal DOI as described above. No USI.  EMG: concordant during filling.    DURING THE VOIDING PHASE:  Maximum detrusor contraction with void: 31 cm of H2O pressure.  Voided volume: 190 mL.  Maximum flow rate: 13 mL/sec.  Average flow rate: 5.5 mL/sec.  Postvoid Residual: 0 mL.  EMG activity: quiet.  Character of voiding curve: continuous.  BOOI: 1.5 (suggesting no obstruction - see key below)  [obstructed (ENGLAND index [BOOI] ? 40); equivocal (no definite   obstruction; BOOI 20-40); and no obstruction (BOOI ? 20)]    ASSESSMENT/PLAN:  Ms. Chasity Hodgson is a pleasant 77 year old female with mixed urinary incontinence who demonstrated the following findings today on urodynamic evaluation:    -Small maximum cystometric capacity 190 mL with heightened filling sensations.  -Good bladder compliance with terminal detrusor overactivity with associated urgency incontinence when PDet reaches 26 cm H2O.   -No urodynamic stress  incontinence.  -Maximum detrusor contraction during voiding reaches 31 cm H2O. She voids 190 mL with slightly reduced flow rate (Qmax 13 ml/s), continuous flow curve, quiet EMG activity, complete bladder emptying (PVR 0 mL), and no evidence for bladder outlet obstruction (BOOI 1.5).     The patient has follow up scheduled with Dr. Zendejas 9/12/23.   We reviewed her study results in detail today. She has terminal DOI despite taking tolterodine 4 mg daily.  Discussed additional treatment options including alternative anticholinergic vs beta 3 agonist medications, bladder Botox injections, SNS, or PTNS. In reviewing prior MyChart and telephone encounters from the last few months, Gemtesa was prescribed but was cost prohibitive. Myrbetriq contraindicated given uncontrolled HTN. Dr. Zendejas had offered trospium ER 60 mg daily or fesoterodine 4 mg daily. Sammie reports that she checked with her insurance and both are covered and affordable. Offered to send a prescription so that she can try a different medicine while awaiting her follow up appointment with Dr. Zendejas. She would like to give trospium a try.   -STOP tolterodine.  -Start trospium ER 60 mg once daily. Dosing instructions and side effects discussed in detail.   -Follow up with Dr. Zendejas as scheduled to review.     - A single cephalexin antibiotic was provided for UTI prophylaxis following completion of today's study per department protocol.  The risk of UTI with UDS is low at ~2.5-3%.      Thank you for allowing me to participate in the care of Ms. Chasity Hodgson and please don't hesitate to contact me with any questions or concerns.      Marbella Martinez PA-C  Urology Physician Assistant    20 minutes spent on the date of the encounter doing chart review, review of test results, interpretation of tests, patient visit and documentation in addition to performing the urodynamics procedure.

## 2023-07-06 NOTE — NURSING NOTE
Chief Complaint   Patient presents with     Urodynamics Study     Overactive bladder/urinary incontinence       Blood pressure (!) 182/80, pulse 67, height 1.524 m (5'), weight 96.6 kg (213 lb), not currently breastfeeding. Body mass index is 41.6 kg/m .    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)     Anemia due to blood loss, acute     ACP (advance care planning)     Postsurgical hypothyroidism     Type 2 diabetes, HbA1c goal < 7% (H)     Gastroparesis     Papillary carcinoma of thyroid (H)     Alopecia     Pulmonary nodule     Esophageal reflux     Dermatitis     Acne rosacea     Diabetic gastroparesis (H)     Poliomyelitis     Left knee pain     Carotid stenosis     Right shoulder pain     Type 2 diabetes mellitus with other specified complication (H)     Insufficiency, arterial, peripheral (H)     Allergic dermatitis due to other chemical product     Normocytic anemia     Morbid obesity with BMI of 40.0-44.9, adult (H)     Mild major depression (H)     CKD (chronic kidney disease) stage 3, GFR 30-59 ml/min (H)     Renal artery stenosis (H)     Neuropathic pain     Mild major depression (H)     Venous insufficiency     Avascular necrosis of bone of hip, left (H)     Contact dermatitis     Diabetic nephropathy associated with type 2 diabetes mellitus (H)     DVT of upper extremity (deep vein thrombosis) (H)     Dysphagia     Edema of lower extremity     History of colonic polyps     History of pulmonary embolus (PE)     Iron deficiency anemia     Inguinal pain     Moderate protein-calorie malnutrition (H)     Osteoarthritis of left hip     Primary insomnia     Right lower quadrant pain     Status post total shoulder arthroplasty, left     Surgical  aftercare, musculoskeletal system     Vitamin D deficiency     Lymphedema     Atypical chest pain     Episodic tension-type headache, not intractable     Blurred vision     Pain of cheek     Abdominal adhesions       Allergies   Allergen Reactions     Ketorolac Tromethamine Difficulty breathing     Shortness of breath     Nsaids Difficulty breathing     Increased creatinine     Celecoxib Itching and Rash     Morphine And Related Itching     With higher doses     Crestor [Rosuvastatin] Muscle Pain (Myalgia)     No Clinical Screening - See Comments Itching     Fragrance     Vioxx Other (See Comments)     Heart races     Conjugated Estrogens Rash     Sulfa Antibiotics Rash       Current Outpatient Medications   Medication Sig Dispense Refill     acetaminophen (TYLENOL) 500 MG tablet Take 1,000 mg by mouth       acetaminophen-caffeine (EXCEDRIN TENSION HEADACHE) 500-65 MG TABS Take 2 tablets by mouth every 6 hours as needed for mild pain 90 tablet 0     albuterol (PROAIR HFA/PROVENTIL HFA/VENTOLIN HFA) 108 (90 Base) MCG/ACT inhaler albuterol sulfate HFA 90 mcg/actuation aerosol inhaler       amoxicillin (AMOXIL) 500 MG capsule Takes 4 caps before every dental appointments.       aspirin (ASA) 81 MG EC tablet Take 81 mg by mouth daily       azelastine (ASTEPRO) 0.15 % nasal spray 1 spray       carvedilol (COREG) 12.5 MG tablet Take 1 tablet (12.5 mg) by mouth 2 times daily (with meals) 180 tablet 3     chlorthalidone (HYGROTON) 25 MG tablet Take 0.5 tablets (12.5 mg) by mouth every other day 45 tablet 3     clotrimazole (LOTRIMIN) 1 % cream Apply topically daily       Continuous Blood Gluc Sensor (FREESTYLE BRANDY 3 SENSOR) MISC 1 each every 14 days 6 each 3     diphenhydrAMINE-acetaminophen (TYLENOL PM)  MG tablet Take 1 tablet by mouth nightly as needed for sleep Reported on 3/20/2017       empagliflozin (JARDIANCE) 10 MG TABS tablet Take 1 tablet (10 mg) by mouth daily 90 tablet 1     ezetimibe (ZETIA) 10 MG  tablet Take 1 tablet (10 mg) by mouth daily 90 tablet 3     famotidine (PEPCID) 20 MG tablet Take 2 tablets (40 mg) by mouth as needed 180 tablet 3     fenofibrate (TRICOR) 145 MG tablet Take 1 tablet (145 mg) by mouth daily 90 tablet 3     ferrous sulfate (IRON) 325 (65 FE) MG tablet Take 325 mg by mouth daily (with breakfast)       fexofenadine (ALLEGRA) 180 MG tablet Take 180 mg by mouth daily. 120 0     fluocinolone acetonide (DERMA SMOOTHE/FS BODY) 0.01 % external oil Apply 2 mL to scalp once per week. Massage into scalp. Can be left in overnight or washed out after 4-6 hours. 118.28 mL 5     fluticasone (FLONASE) 50 MCG/ACT spray Spray 2 sprays in nostril daily 2 sprays in each nostril qd 1 Bottle 0     furosemide (LASIX) 20 MG tablet Take 1 tablet (20 mg) by mouth daily as needed (lower extremity edema) 90 tablet 3     Icosapent Ethyl 1 g CAPS Take 1 g by mouth 2 times daily 60 capsule 11     insulin aspart (NOVOLOG PEN) 100 UNIT/ML pen Inject 5 Units Subcutaneous 3 times daily (with meals) 15 mL 3     insulin glargine (BASAGLAR KWIKPEN) 100 UNIT/ML pen Inject 25 Units Subcutaneous daily 15 mL 4     insulin pen needle (B-D U/F) 31G X 5 MM miscellaneous Use 1 pen needles daily or as directed. 90 each 3     MULTIPLE VITAMIN PO Take 1 tablet by mouth       nitroGLYcerin (NITROSTAT) 0.4 MG sublingual tablet Place 1 tablet (0.4 mg) under the tongue every 5 minutes as needed for chest pain (no more than 3 in one hour; after 3rd, call 911.) 25 tablet 3     nystatin (MYCOSTATIN) cream Apply topically daily as needed        nystatin-triamcinolone (MYCOLOG II) cream Apply topically daily as needed        olmesartan (BENICAR) 40 MG tablet Take 1 tablet (40 mg) by mouth daily 30 tablet 3     ondansetron (ZOFRAN) 4 MG tablet Take 1 tablet (4 mg) by mouth every 6 hours as needed Reported on 3/20/2017 20 tablet 0     order for DME Equipment being ordered: Compression stockings - Knee High; 20-30 mmHg compression - note  would like adhesive band to keep the stocking from sliding down 3 each 0     pantoprazole (PROTONIX) 40 MG EC tablet Take 40 mg by mouth 2 times daily       polyethylene glycol 400 (BLINK TEARS) 0.25 % SOLN ophthalmic solution        sucralfate (CARAFATE) 1 GM/10ML suspension Take 1 g by mouth 4 times daily       SYNTHROID 112 MCG tablet TAKE ONE-HALF TABLET BY MOUTH DAILY ON  AND TAKE ONE TABLET BY MOUTH ALL OTHER DAYS AS DIRECTED 90 tablet 3     tolterodine ER (DETROL LA) 4 MG 24 hr capsule Take 1 capsule (4 mg) by mouth daily 30 capsule 11     tretinoin (RETIN-A) 0.025 % external cream Use every night as tolerated - spot treat lesion 20 g 3     triamcinolone (KENALOG) 0.1 % external ointment Apply topically every other day 80 g 3     Vitamin D3 50 mcg (2000 units) tablet 1 tablet daily         Social History     Tobacco Use     Smoking status: Never     Smokeless tobacco: Never   Vaping Use     Vaping Use: Never used   Substance Use Topics     Alcohol use: Never     Drug use: Never       Invasive Procedure Safety Checklist:    Procedure: Urodynamics    Action: Complete sections and checkboxes as appropriate.    Pre-procedure:    1. Patient ID Verified with 2 identifiers (Shadia and  or MRN) : YES    2. Procedure and site verified with patient/designee (when able) : YES    3. Accurate consent documentation in medical record : YES    4. H&P (or appropriate assessment) documented in medical record : N/A    H&P must be up to 30 days prior to procedure an updated within 24 hours of Procedure as applicable.     5. Relevant diagnostic and radiology test results appropriately labeled and displayed as applicable : YES    6. Blood products, implants, devices, and/or special equipment available for the procedure as applicable : YES    7. Procedure site(s) marked with provider initials [Exclusions: none] : NO    8. Marking not required. Reason : Yes  Procedure does not require site marking    Time Out:     Time-Out  performed immediately prior to starting procedure, including verbal and active participation of all team members addressing: YES    1. Correct patient identity.  2. Confirmed that the correct side and site are marked.  3. An accurate procedure to be done.  4. Agreement on the procedure to be done.  5. Correct patient position.  6. Relevant images and results are properly labeled and appropriately displayed.  7. The need to administer antibiotics or fluids for irrigation purposes during the procedure as applicable.  8. Safety precautions based on patient history or medication use.    During Procedure: Verification of correct person, site, and procedure occurs any time the responsibility for care of the patient is transferred to another member of the care team.          The following medication was given:     MEDICATION:  Keflex (Cefalexin)  ROUTE: PO  SITE: Medication was given orally  DOSE: 500mg  LOT #: [id number]  : Elysia    EXPIRATION DATE: 09/2024  NDC#:  [id number]     Was there drug waste? No        Imani Louis CMA  7/6/2023  11:04 AM

## 2023-07-06 NOTE — NURSING NOTE
Patient reports that her blood pressure reading at home this morning was 135/64.  Patient does admit to being a bit nervous for procedure today.  She denies headache, chest pain, shortness of breath.  Marbella Martinez PA-C notified of lower blood pressure reading after Urodynamics testing was completed.  Per Marbella, OK for patient to go home.  Patient states she will monitor her blood pressure at home today.    Imani Louis, CMA

## 2023-07-10 ENCOUNTER — TELEPHONE (OUTPATIENT)
Dept: UROLOGY | Facility: CLINIC | Age: 78
End: 2023-07-10
Payer: MEDICARE

## 2023-07-10 NOTE — TELEPHONE ENCOUNTER
Patient called nurse line and is concerned about Trospium prescription causing gastroperotesis. Patient takes protonix and another medication patient couldn't remember the name for her stomach. She is concerned about starting a different medication, because she can't afford another script this month. Also, patient would like to know if she should try the trospium for now. Will send message to MD to advise.     Violeta Gallegos LPN

## 2023-07-14 NOTE — TELEPHONE ENCOUNTER
"Returned phone call and informed patient of MD's notes -\"Yes there is a risk of decreasing GI motility but there is no way to know by how much.  If she wants to try it she can with understanding those risks.  If she wants to know how it might interact with her other medications she should reach out to her pharmacist. \"    Violeta Gallegos LPN    "

## 2023-07-17 ENCOUNTER — MYC MEDICAL ADVICE (OUTPATIENT)
Dept: FAMILY MEDICINE | Facility: CLINIC | Age: 78
End: 2023-07-17
Payer: MEDICARE

## 2023-07-17 DIAGNOSIS — D17.0 LIPOMA OF NECK: Primary | ICD-10-CM

## 2023-07-17 NOTE — TELEPHONE ENCOUNTER
Please review MyChart from patient.    Milton Pate, [facility] on 7/17/2023 at 3:12 PM

## 2023-07-18 NOTE — TELEPHONE ENCOUNTER
I spoke to Chasity Hodgson and recommended she return to the office for blood pressure recheck and discuss management of vascular risk factors to prevent microvascular disease  - can we set her up for an in office visit in 2-3 weeks?    For soft tissue mass that are felt to be lipoma I did recommend she follow up in ENT    Nik Wadsworth MD   [facility]   [address] [address]   Phone: [phone]   Fax: [phone]

## 2023-07-26 ENCOUNTER — DOCUMENTATION ONLY (OUTPATIENT)
Dept: SLEEP MEDICINE | Facility: CLINIC | Age: 78
End: 2023-07-26
Payer: MEDICARE

## 2023-07-26 NOTE — PROGRESS NOTES
14  DAY STM VISIT    Diagnostic AHI: 8.1  PSG    Subjective measures: Patient reports that her machine has been blowing too much air and waking her up. Could be dmask leak- she has 3 cushion sizes she will experiment with. Changed response from standard to soft and turned on EPR. She has an appt with [facility] next tues for them to look at her machine. Unsure why she has that appt. Told her to keep it in case she needs mask exchange. Will clal her on Monday to check in    Assessment: Pt not meeting objective benchmarks for leak and compliance  Patient failing following subjective benchmarks: pressure issues and leak issues     Action plan: pt to have 30 day STM visit.      Device type: Auto-CPAP    PAP settings:  DEVICE TYPE CPAP MIN CPAP MAX 95TH % PRESSURE EPR MASK DISPENSED   Auto-CPAP    5.0 cm  H20 15.0 cm  H20 9.2 cm  H20  TWO Nasal Mask     Mask type:  Nasal Mask    Objective measures: 14 day rolling measures   COMPLIANCE LEAK AHI AVERAGE USE IN MINUTES   50 % 64.8 1.9 303   GOAL >70% GOAL < 24 LPM GOAL <5 GOAL >240      Patient has the following upcoming sleep appts:      Total time spent on accessing and interpreting remote patient PAP therapy data  10 minutes    Total time spent counseling, coaching  and reviewing PAP therapy data with patient  5 minutes    [id number]  [id number]  no (3 day STM)

## 2023-07-31 ENCOUNTER — TRANSFERRED RECORDS (OUTPATIENT)
Dept: HEALTH INFORMATION MANAGEMENT | Facility: CLINIC | Age: 78
End: 2023-07-31
Payer: MEDICARE

## 2023-07-31 LAB — RETINOPATHY: NEGATIVE

## 2023-08-01 ENCOUNTER — DOCUMENTATION ONLY (OUTPATIENT)
Dept: SLEEP MEDICINE | Facility: CLINIC | Age: 78
End: 2023-08-01
Payer: MEDICARE
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
## 2023-08-08 ENCOUNTER — TRANSFERRED RECORDS (OUTPATIENT)
Dept: HEALTH INFORMATION MANAGEMENT | Facility: CLINIC | Age: 78
End: 2023-08-08
Payer: MEDICARE

## 2023-08-10 ENCOUNTER — DOCUMENTATION ONLY (OUTPATIENT)
Dept: SLEEP MEDICINE | Facility: CLINIC | Age: 78
End: 2023-08-10
Payer: MEDICARE

## 2023-08-10 NOTE — PROGRESS NOTES
STM Recheck:  Patient looking to get a travel CPAP.  She doesn't like cleaning her CPAP.  Told patient the machine can be loud. She is going to send her Provider a message.

## 2023-08-11 ENCOUNTER — OFFICE VISIT (OUTPATIENT)
Dept: FAMILY MEDICINE | Facility: CLINIC | Age: 78
End: 2023-08-11
Payer: MEDICARE

## 2023-08-11 VITALS
BODY MASS INDEX: 41.09 KG/M2 | WEIGHT: 209.3 LBS | DIASTOLIC BLOOD PRESSURE: 57 MMHG | OXYGEN SATURATION: 100 % | RESPIRATION RATE: 16 BRPM | HEIGHT: 60 IN | TEMPERATURE: 96.9 F | HEART RATE: 69 BPM | SYSTOLIC BLOOD PRESSURE: 113 MMHG

## 2023-08-11 DIAGNOSIS — I87.2 VENOUS INSUFFICIENCY: ICD-10-CM

## 2023-08-11 DIAGNOSIS — K11.20 SALIVARY GLAND INFLAMMATION: Primary | ICD-10-CM

## 2023-08-11 DIAGNOSIS — E11.69 TYPE 2 DIABETES MELLITUS WITH OTHER SPECIFIED COMPLICATION, WITHOUT LONG-TERM CURRENT USE OF INSULIN (H): ICD-10-CM

## 2023-08-11 DIAGNOSIS — I10 BENIGN ESSENTIAL HYPERTENSION: ICD-10-CM

## 2023-08-11 DIAGNOSIS — N18.30 STAGE 3 CHRONIC KIDNEY DISEASE, UNSPECIFIED WHETHER STAGE 3A OR 3B CKD (H): ICD-10-CM

## 2023-08-11 PROCEDURE — 99213 OFFICE O/P EST LOW 20 MIN: CPT | Performed by: INTERNAL MEDICINE

## 2023-08-11 ASSESSMENT — PAIN SCALES - GENERAL: PAINLEVEL: NO PAIN (0)

## 2023-08-11 NOTE — PATIENT INSTRUCTIONS
(K11.20) Salivary gland inflammation  (primary encounter diagnosis)  Comment: follow up in ENT  Plan:     (E11.69) Type 2 diabetes mellitus with other specified complication, without long-term current use of insulin (H)  Comment: Follow-up in endocrinology - no changes   Plan:     (I10) Benign essential hypertension  Comment: blood pressure is stable - recommend avoiding minoxidil  Plan:     (I87.2) Venous insufficiency  Comment: Cotinue to keep feet elevated and use compression stockings  Plan:     (N18.30) Stage 3 chronic kidney disease, unspecified whether stage 3a or 3b CKD (H)  Comment: follow up in nephrology  Plan:

## 2023-08-11 NOTE — PROGRESS NOTES
Raheem Cochran is a 77 year old, presenting for the following health issues:  Hypertension      History of Present Illness       Reason for visit:  Discuss swelling of lymph nodes and swelling in neck and face, soreness in jaw and ears. Check swelling and color of right foot.    She eats 2-3 servings of fruits and vegetables daily.She consumes 0 sweetened beverage(s) daily.She exercises with enough effort to increase her heart rate 9 or less minutes per day.  She exercises with enough effort to increase her heart rate 3 or less days per week.   She is taking medications regularly.     Cervical lymph nodes  Salivary gland inflammation   Chasity Hodgson has been noticing swelling of lymph nodes in neck and face and soreness in jaw and ears.  Has been seen in ENT this past week and was recommended to have CT head and neck with and without contrast.  She will be scheduling with outpatient radiology next week.  She was advised to massage salivary gland and use heat and suck on lemon drops.          Review of Systems   Constitutional, HEENT, cardiovascular - venous insufficiency , pulmonary, GI, , musculoskeletal - walking with use of walker, neuro, skin - skin changes from venous insufficiency in the feet, endocrine and psych systems are negative, except as otherwise noted.      Objective    /57   Pulse 69   Temp 96.9  F (36.1  C) (Tympanic)   Resp 16   Ht 1.524 m (5')   Wt 94.9 kg (209 lb 4.8 oz)   SpO2 100%   BMI 40.88 kg/m    There is no height or weight on file to calculate BMI.  Physical Exam   GENERAL: healthy, alert and no distress  EYES: Eyes grossly normal to inspection,    HENT: ear canals and TM's normal   NECK: no adenopathy, no asymmetry   RESP: lungs clear to auscultation - no rales, rhonchi or wheezes  CV: regular rate and rhythm, normal S1 S2, no S3 or S4, no murmur, 2+ edema bilateral  ABDOMEN: obese, soft,    MS: no gross musculoskeletal defects noted, 2+ edema  SKIN: no suspicious  lesions or rashes  NEURO: Normal strength and tone, mentation intact and speech normal  PSYCH: mentation appears normal, affect normal/bright      Patient Instructions   (K11.20) Salivary gland inflammation  (primary encounter diagnosis)  Comment: follow up in ENT  Plan:     (E11.69) Type 2 diabetes mellitus with other specified complication, without long-term current use of insulin (H)  Comment: Follow-up in endocrinology - no changes   Plan:     (I10) Benign essential hypertension  Comment: blood pressure is stable - recommend avoiding minoxidil  Plan:     (I87.2) Venous insufficiency  Comment: Cotinue to keep feet elevated and use compression stockings  Plan:     (N18.30) Stage 3 chronic kidney disease, unspecified whether stage 3a or 3b CKD (H)  Comment: follow up in nephrology  Plan:             20 minutes spent with patient.  Over 50% of time counseling

## 2023-08-16 ENCOUNTER — OFFICE VISIT (OUTPATIENT)
Dept: UROLOGY | Facility: CLINIC | Age: 78
End: 2023-08-16
Payer: MEDICARE

## 2023-08-16 VITALS
DIASTOLIC BLOOD PRESSURE: 86 MMHG | WEIGHT: 207 LBS | HEIGHT: 60 IN | OXYGEN SATURATION: 100 % | HEART RATE: 70 BPM | SYSTOLIC BLOOD PRESSURE: 146 MMHG | BODY MASS INDEX: 40.64 KG/M2

## 2023-08-16 DIAGNOSIS — N39.46 MIXED INCONTINENCE: Primary | ICD-10-CM

## 2023-08-16 DIAGNOSIS — N32.81 OAB (OVERACTIVE BLADDER): ICD-10-CM

## 2023-08-16 PROCEDURE — 99214 OFFICE O/P EST MOD 30 MIN: CPT | Performed by: UROLOGY

## 2023-08-16 ASSESSMENT — PAIN SCALES - GENERAL: PAINLEVEL: MODERATE PAIN (5)

## 2023-08-16 NOTE — PATIENT INSTRUCTIONS
Websites with free information:    [employer] patient website: www.voicesforpfd.org    Total Control Program: www.totalcontrolprogram.com    Surgery scheduler [phone]    [facility] [phone]    It was a pleasure meeting with you today.  Thank you for allowing me and my team the privilege of caring for you today.  YOU are the reason we are here, and I truly hope we provided you with the excellent service you deserve.  Please let us know if there is anything else we can do for you so that we can be sure you are leaving completely satisfied with your care experience.

## 2023-08-16 NOTE — PROGRESS NOTES
Chief Complaint   Patient presents with    hx of overactive bladder    Follow Up     Started new medication     New medication is not working   Kalli Sims, clinic assistant

## 2023-08-16 NOTE — PROGRESS NOTES
August 16, 2023    Chasity was seen today for hx of overactive bladder and follow up.    Diagnoses and all orders for this visit:    Mixed incontinence  -     Case Request: CYSTOSCOPY, WITH BOTULINUM TOXIN INJECTION; Standing  -     Case Request: CYSTOSCOPY, WITH BOTULINUM TOXIN INJECTION    OAB (overactive bladder)  -     Case Request: CYSTOSCOPY, WITH BOTULINUM TOXIN INJECTION; Standing  -     Case Request: CYSTOSCOPY, WITH BOTULINUM TOXIN INJECTION    Other orders  -     Void on call to OR; Standing  -     Forced Air Warming Device; Standing  -     Cleanse skin for procedure; Standing  -     Nozin Nasal  Popswab; Standing  -     Notify Provider - Anticoagulants and Antiplatelets; Standing  -     Glucose monitor nursing POCT; Standing  -     NPO per Anesthesia Guidelines for Procedure/Surgery Except for: Meds; Standing  -     Apply Pneumatic Compression Device (PCD); Standing  -     Pneumatic Compression Device (PCD) (Equipment); Standing  -     vancomycin (VANCOCIN) 1,500 mg in sodium chloride 0.9 % 250 mL intermittent infusion       At this time we discussed different 3rd line options of PTNS, botox, and SNS,  She is most interested in proceeding with botox    We discussed that the risks to the procedure include but not limited to cardiovascular risks of anesthesia, nerve injury, bleeding, infection, rare risk of local migration of the botox, urinary retention, need for further botox injections if it works.  Patient expressed understanding and agreeable to proceed.    She can stop the medications as we prepare to do the botox hopefullly in the near future    5 minutes were spent today on the day of the encounter in reviewing the EMR including Marbella Martinez's note, direct patient care including discussion of prescription medications and surgical counseling, coordination of care and documentation    Mishel Zendejas MD MPH  (she/her/hers)   of Urology  [facility]  [place]      Subjective    Here today for follow up, the new medication does not help. Interested in moving to other options.  UDS with DO/DOI-note from Marbella Martinez 7/6/23 reviewed in Epic.  She denies any changes in health since last visit    BP (!) 146/86   Pulse 70   Ht 1.524 m (5')   Wt 93.9 kg (207 lb)   SpO2 100%   BMI 40.43 kg/m    GENERAL: healthy, alert and no distress  EYES: Eyes grossly normal to inspection, conjunctivae and sclerae normal  HENT: normal cephalic/atraumatic.  External ears, nose and mouth without ulcers or lesions.  RESP: no audible wheeze, cough, or visible cyanosis.  No visible retractions or increased work of breathing.  Able to speak fully in complete sentences.  NEURO: Cranial nerves grossly intact, mentation intact and speech normal  PSYCH: mentation appears normal, affect normal/bright, judgement and insight intact, normal speech and appearance well-groomed    CC  Patient Care Team:  Shola Benoit MD as PCP - General (Internal Medicine)  Shola Benoit MD as Assigned PCP  Renetta Meyer MD as MD (Dermatology)  ROMI Roque MD as MD (INTERNAL MEDICINE - ENDOCRINOLOGY, DIABETES & METABOLISM)  Ulises Pantoja MD as MD (Gastroenterology)  Kishore Ferrera MD as MD (Orthopedics)  Tara Cornejo, RN as Diabetes Educator (Diabetes Education)  Catrachita Prather, PharmD as Pharmacist (Pharmacist)  Odette Weston MD as MD (Endocrinology, Diabetes, and Metabolism)  Odette Weston MD as Assigned Endocrinology Provider  Goltz, Bennett Ezra, PA-C as Assigned Neuroscience Provider  Terra Bridges PA-C as Physician Assistant (Urology)  Amber Chan APRN CNP as Assigned Heart and Vascular Provider  Terra Bridges PA-C as Assigned Surgical Provider  Marbella Martinez PA-C as Physician Assistant (Urology)  Yolanda Tapia PA-C as Physician Assistant (Endocrinology, Diabetes, and Metabolism)

## 2023-08-16 NOTE — LETTER
8/16/2023       RE: Chasity Hodgson  [id number]  [facility] [address]     Dear Colleague,    Thank you for referring your patient, Chasity Hodgson, to the [facility]. Please see a copy of my visit note below.    Chief Complaint   Patient presents with    hx of overactive bladder    Follow Up     Started new medication     New medication is not working   Kalli Sims, clinic assistant    August 16, 2023    Chasity was seen today for hx of overactive bladder and follow up.    Diagnoses and all orders for this visit:    Mixed incontinence  -     Case Request: CYSTOSCOPY, WITH BOTULINUM TOXIN INJECTION; Standing  -     Case Request: CYSTOSCOPY, WITH BOTULINUM TOXIN INJECTION    OAB (overactive bladder)  -     Case Request: CYSTOSCOPY, WITH BOTULINUM TOXIN INJECTION; Standing  -     Case Request: CYSTOSCOPY, WITH BOTULINUM TOXIN INJECTION    Other orders  -     Void on call to OR; Standing  -     Forced Air Warming Device; Standing  -     Cleanse skin for procedure; Standing  -     Nozin Nasal  Popswab; Standing  -     Notify Provider - Anticoagulants and Antiplatelets; Standing  -     Glucose monitor nursing POCT; Standing  -     NPO per Anesthesia Guidelines for Procedure/Surgery Except for: Meds; Standing  -     Apply Pneumatic Compression Device (PCD); Standing  -     Pneumatic Compression Device (PCD) (Equipment); Standing  -     vancomycin (VANCOCIN) 1,500 mg in sodium chloride 0.9 % 250 mL intermittent infusion       At this time we discussed different 3rd line options of PTNS, botox, and SNS,  She is most interested in proceeding with botox    We discussed that the risks to the procedure include but not limited to cardiovascular risks of anesthesia, nerve injury, bleeding, infection, rare risk of local migration of the botox, urinary retention, need for further botox injections if it works.  Patient expressed  understanding and agreeable to proceed.    She can stop the medications as we prepare to do the botox hopefullly in the near future    5 minutes were spent today on the day of the encounter in reviewing the EMR including Marbella Martinez's note, direct patient care including discussion of prescription medications and surgical counseling, coordination of care and documentation    Mishel Zendejas MD MPH  (she/her/hers)   of Urology  [facility]      Subjective    Here today for follow up, the new medication does not help. Interested in moving to other options.  UDS with DO/DOI-note from Marbella Martinez 7/6/23 reviewed in Epic.  She denies any changes in health since last visit    BP (!) 146/86   Pulse 70   Ht 1.524 m (5')   Wt 93.9 kg (207 lb)   SpO2 100%   BMI 40.43 kg/m    GENERAL: healthy, alert and no distress  EYES: Eyes grossly normal to inspection, conjunctivae and sclerae normal  HENT: normal cephalic/atraumatic.  External ears, nose and mouth without ulcers or lesions.  RESP: no audible wheeze, cough, or visible cyanosis.  No visible retractions or increased work of breathing.  Able to speak fully in complete sentences.  NEURO: Cranial nerves grossly intact, mentation intact and speech normal  PSYCH: mentation appears normal, affect normal/bright, judgement and insight intact, normal speech and appearance well-groomed    CC  Patient Care Team:  Shola Benoit MD as PCP - General (Internal Medicine)  Shola Benoit MD as Assigned PCP  Renetta Meyer MD as MD (Dermatology)  ROMI Roque MD as MD (INTERNAL MEDICINE - ENDOCRINOLOGY, DIABETES & METABOLISM)  Ulises Pantoja MD as MD (Gastroenterology)  Kishore Ferrera MD as MD (Orthopedics)  Tara Cornejo, RN as Diabetes Educator (Diabetes Education)  Catrachita Prather, PharmD as Pharmacist (Pharmacist)  Odette Weston MD as MD (Endocrinology, Diabetes, and Metabolism)  Odette Weston MD  as Assigned Endocrinology Provider  Goltz, Bennett Ezra, PA-C as Assigned Neuroscience Provider  Terra Bridges PA-C as Physician Assistant (Urology)  Amber Chan APRN CNP as Assigned Heart and Vascular Provider  Terra Bridges PA-C as Assigned Surgical Provider  Marbella Martinez PA-C as Physician Assistant (Urology)  Yolanda Tapia PA-C as Physician Assistant (Endocrinology, Diabetes, and Metabolism)

## 2023-08-17 ENCOUNTER — TELEPHONE (OUTPATIENT)
Dept: UROLOGY | Facility: CLINIC | Age: 78
End: 2023-08-17
Payer: MEDICARE

## 2023-08-17 NOTE — TELEPHONE ENCOUNTER
Patient is scheduled for surgery with Dr. Zendejas.     Spoke with: Patient    Date of Surgery: 9/05/23    Location: [facility] OR     Pre op with Provider: SUSHMA     H&P: Patient will be scheduling with PCP, Shola Benoit. Informed patient this will need to be done within 30 days of scheduled surgery date. Patient was also given callback number [phone] should she wish to schedule with PAC.     Additional imaging/appointments: Will schedule patients 4 week post op with Lupe Mcrae.     Surgery packet: Per patients preference, will mail packet to address in chart.      Additional comments: SUSHMA.         Radha Rivera on 8/17/2023 at 2:14 PM

## 2023-08-20 ENCOUNTER — MYC MEDICAL ADVICE (OUTPATIENT)
Dept: SLEEP MEDICINE | Facility: CLINIC | Age: 78
End: 2023-08-20
Payer: MEDICARE

## 2023-08-20 DIAGNOSIS — G47.33 OBSTRUCTIVE SLEEP APNEA (ADULT) (PEDIATRIC): Primary | ICD-10-CM

## 2023-08-22 ENCOUNTER — TRANSFERRED RECORDS (OUTPATIENT)
Dept: HEALTH INFORMATION MANAGEMENT | Facility: CLINIC | Age: 78
End: 2023-08-22
Payer: MEDICARE

## 2023-08-24 ENCOUNTER — TELEPHONE (OUTPATIENT)
Dept: UROLOGY | Facility: CLINIC | Age: 78
End: 2023-08-24
Payer: MEDICARE

## 2023-08-24 DIAGNOSIS — N39.42 URINARY INCONTINENCE WITHOUT SENSORY AWARENESS: Primary | ICD-10-CM

## 2023-08-24 NOTE — TELEPHONE ENCOUNTER
Called to see if the patient has any questions about her upcoming procedure with Dr Zendejas 9/5/23. Patient is scheduled to have her pre-op 8/25/23. Asked patient to get urine culture done tomorrow. Orders in system.  Reviewed surgery details with the patient and showering. Patient will stop at the [place] pharmacy to pick a bottle. Patient will have her daughter  her post surgery. The daughter will be working until 330 pm. Daughter is a nurse at the [employer]     Purvi Hess RN, BSN  Care Coordinator Urology  [facility], [place]  Urology Clinic  [phone]

## 2023-08-25 ENCOUNTER — OFFICE VISIT (OUTPATIENT)
Dept: FAMILY MEDICINE | Facility: CLINIC | Age: 78
End: 2023-08-25
Payer: MEDICARE

## 2023-08-25 ENCOUNTER — APPOINTMENT (OUTPATIENT)
Dept: LAB | Facility: CLINIC | Age: 78
End: 2023-08-25
Payer: MEDICARE

## 2023-08-25 VITALS
SYSTOLIC BLOOD PRESSURE: 154 MMHG | BODY MASS INDEX: 41.4 KG/M2 | WEIGHT: 210.9 LBS | HEIGHT: 60 IN | HEART RATE: 64 BPM | OXYGEN SATURATION: 99 % | TEMPERATURE: 97.1 F | RESPIRATION RATE: 17 BRPM | DIASTOLIC BLOOD PRESSURE: 64 MMHG

## 2023-08-25 DIAGNOSIS — A80.9 POLIOMYELITIS: ICD-10-CM

## 2023-08-25 DIAGNOSIS — G47.33 OSA (OBSTRUCTIVE SLEEP APNEA): ICD-10-CM

## 2023-08-25 DIAGNOSIS — Z01.818 PREOP GENERAL PHYSICAL EXAM: Primary | ICD-10-CM

## 2023-08-25 DIAGNOSIS — E11.69 TYPE 2 DIABETES MELLITUS WITH OTHER SPECIFIED COMPLICATION, WITH LONG-TERM CURRENT USE OF INSULIN (H): ICD-10-CM

## 2023-08-25 DIAGNOSIS — Z79.4 TYPE 2 DIABETES MELLITUS WITH OTHER SPECIFIED COMPLICATION, WITH LONG-TERM CURRENT USE OF INSULIN (H): ICD-10-CM

## 2023-08-25 DIAGNOSIS — E11.22 STAGE 3 CHRONIC KIDNEY DISEASE DUE TO TYPE 2 DIABETES MELLITUS (H): ICD-10-CM

## 2023-08-25 DIAGNOSIS — N39.42 URINARY INCONTINENCE WITHOUT SENSORY AWARENESS: ICD-10-CM

## 2023-08-25 DIAGNOSIS — I87.2 VENOUS INSUFFICIENCY: ICD-10-CM

## 2023-08-25 DIAGNOSIS — N18.30 STAGE 3 CHRONIC KIDNEY DISEASE DUE TO TYPE 2 DIABETES MELLITUS (H): ICD-10-CM

## 2023-08-25 DIAGNOSIS — L03.116 CELLULITIS OF LEFT LEG: ICD-10-CM

## 2023-08-25 DIAGNOSIS — Z86.718 HISTORY OF DVT (DEEP VEIN THROMBOSIS): ICD-10-CM

## 2023-08-25 LAB
ALBUMIN MFR UR ELPH: <6 MG/DL
ALBUMIN UR-MCNC: NEGATIVE MG/DL
ANION GAP SERPL CALCULATED.3IONS-SCNC: 13 MMOL/L (ref 7–15)
APPEARANCE UR: CLEAR
BACTERIA #/AREA URNS HPF: ABNORMAL /HPF
BASOPHILS # BLD AUTO: 0.1 10E3/UL (ref 0–0.2)
BASOPHILS NFR BLD AUTO: 1 %
BILIRUB UR QL STRIP: NEGATIVE
BUN SERPL-MCNC: 28.7 MG/DL (ref 8–23)
CALCIUM SERPL-MCNC: 9.6 MG/DL (ref 8.8–10.2)
CHLORIDE SERPL-SCNC: 103 MMOL/L (ref 98–107)
COLOR UR AUTO: YELLOW
CREAT SERPL-MCNC: 1.24 MG/DL (ref 0.51–0.95)
CREAT UR-MCNC: 60 MG/DL
DEPRECATED HCO3 PLAS-SCNC: 23 MMOL/L (ref 22–29)
EOSINOPHIL # BLD AUTO: 0.2 10E3/UL (ref 0–0.7)
EOSINOPHIL NFR BLD AUTO: 3 %
ERYTHROCYTE [DISTWIDTH] IN BLOOD BY AUTOMATED COUNT: 12.5 % (ref 10–15)
GFR SERPL CREATININE-BSD FRML MDRD: 45 ML/MIN/1.73M2
GLUCOSE SERPL-MCNC: 214 MG/DL (ref 70–99)
GLUCOSE UR STRIP-MCNC: >=1000 MG/DL
HBA1C MFR BLD: 8.7 % (ref 0–5.6)
HCT VFR BLD AUTO: 33.2 % (ref 35–47)
HGB BLD-MCNC: 11.1 G/DL (ref 11.7–15.7)
HGB UR QL STRIP: NEGATIVE
IMM GRANULOCYTES # BLD: 0 10E3/UL
IMM GRANULOCYTES NFR BLD: 0 %
IRON BINDING CAPACITY (ROCHE): 367 UG/DL (ref 240–430)
IRON SATN MFR SERPL: 24 % (ref 15–46)
IRON SERPL-MCNC: 89 UG/DL (ref 37–145)
KETONES UR STRIP-MCNC: NEGATIVE MG/DL
LEUKOCYTE ESTERASE UR QL STRIP: NEGATIVE
LYMPHOCYTES # BLD AUTO: 2 10E3/UL (ref 0.8–5.3)
LYMPHOCYTES NFR BLD AUTO: 27 %
MCH RBC QN AUTO: 31 PG (ref 26.5–33)
MCHC RBC AUTO-ENTMCNC: 33.4 G/DL (ref 31.5–36.5)
MCV RBC AUTO: 93 FL (ref 78–100)
MONOCYTES # BLD AUTO: 0.6 10E3/UL (ref 0–1.3)
MONOCYTES NFR BLD AUTO: 8 %
NEUTROPHILS # BLD AUTO: 4.5 10E3/UL (ref 1.6–8.3)
NEUTROPHILS NFR BLD AUTO: 61 %
NITRATE UR QL: NEGATIVE
PH UR STRIP: 5.5 [PH] (ref 5–7)
PLATELET # BLD AUTO: 203 10E3/UL (ref 150–450)
POTASSIUM SERPL-SCNC: 4.5 MMOL/L (ref 3.4–5.3)
PROT/CREAT 24H UR: NORMAL MG/G{CREAT}
RBC # BLD AUTO: 3.58 10E6/UL (ref 3.8–5.2)
RBC #/AREA URNS AUTO: ABNORMAL /HPF
SODIUM SERPL-SCNC: 139 MMOL/L (ref 136–145)
SP GR UR STRIP: 1.01 (ref 1–1.03)
SQUAMOUS #/AREA URNS AUTO: ABNORMAL /LPF
UROBILINOGEN UR STRIP-ACNC: 0.2 E.U./DL
WBC # BLD AUTO: 7.3 10E3/UL (ref 4–11)
WBC #/AREA URNS AUTO: ABNORMAL /HPF

## 2023-08-25 PROCEDURE — 85025 COMPLETE CBC W/AUTO DIFF WBC: CPT | Performed by: PHYSICIAN ASSISTANT

## 2023-08-25 PROCEDURE — 99214 OFFICE O/P EST MOD 30 MIN: CPT | Performed by: PHYSICIAN ASSISTANT

## 2023-08-25 PROCEDURE — 36415 COLL VENOUS BLD VENIPUNCTURE: CPT | Performed by: PHYSICIAN ASSISTANT

## 2023-08-25 PROCEDURE — 83540 ASSAY OF IRON: CPT | Performed by: PHYSICIAN ASSISTANT

## 2023-08-25 PROCEDURE — 81001 URINALYSIS AUTO W/SCOPE: CPT | Performed by: PHYSICIAN ASSISTANT

## 2023-08-25 PROCEDURE — 93000 ELECTROCARDIOGRAM COMPLETE: CPT | Performed by: PHYSICIAN ASSISTANT

## 2023-08-25 PROCEDURE — 80048 BASIC METABOLIC PNL TOTAL CA: CPT | Performed by: PHYSICIAN ASSISTANT

## 2023-08-25 PROCEDURE — 83550 IRON BINDING TEST: CPT | Performed by: PHYSICIAN ASSISTANT

## 2023-08-25 PROCEDURE — 87086 URINE CULTURE/COLONY COUNT: CPT | Performed by: PHYSICIAN ASSISTANT

## 2023-08-25 PROCEDURE — 84156 ASSAY OF PROTEIN URINE: CPT | Performed by: PHYSICIAN ASSISTANT

## 2023-08-25 PROCEDURE — 83036 HEMOGLOBIN GLYCOSYLATED A1C: CPT | Performed by: PHYSICIAN ASSISTANT

## 2023-08-25 RX ORDER — CEPHALEXIN 500 MG/1
500 CAPSULE ORAL 3 TIMES DAILY
Qty: 30 CAPSULE | Refills: 0 | Status: ON HOLD | OUTPATIENT
Start: 2023-08-25 | End: 2023-09-01

## 2023-08-25 ASSESSMENT — PAIN SCALES - GENERAL: PAINLEVEL: MODERATE PAIN (5)

## 2023-08-25 NOTE — PATIENT INSTRUCTIONS
Stop all NSAIDs (motrin, ibuprofen, naproxen, aleve, advil, naprosyn, aspirin)  for at least 5 days prior to surgery.    Stop Icosapent and biotin one week prior to surgery.    Don't take Jardiance three days prior to procedure    Don't take any novolog the am of surgery    Cut your basaglar (glargine) insulin dose down to 15 units the am of surgery.    Take carvedilol, pepcid, olmesartan, protonix and synthroid with a sip of water the am of surgery.    Take lasix, multivitamin, allegra, chlorthalidone after surgery the day of procedure.    Take the rest of your evening medications as usual the night before surgery.    Start keflex today    Elevate your legs as much as possible    Please let Dr. Zendejas know you are being treated for cellulitis of your left leg.  She may want to postpone surgery until after the cellulitis has cleared up.

## 2023-08-25 NOTE — PROGRESS NOTES
[facility]  [address] [place] [address], [address]  [facility] [address]  Phone: [phone]  Primary Provider: Shola Benoit  Pre-op Performing Provider: JEANETTE MELTON      PREOPERATIVE EVALUATION:  Today's date: 8/25/2023    Chasity Hodgson is a 77 year old female who presents for a preoperative evaluation.       No data to display                Surgical Information:  Surgery/Procedure: CYSTOSCOPY, WITH BOTULINUM TOXIN INJECTION   Surgery Location: [facility]  Surgeon: Mishel Zendejas MD   Surgery Date: 9/5/2023   Time of Surgery: 11:55 AM  Where patient plans to recover: At home with family  Fax number for surgical facility: Note does not need to be faxed, will be available electronically in Epic.    Assessment & Plan     The proposed surgical procedure is considered LOW risk.      (Z01.818) Preop general physical exam  (primary encounter diagnosis)  Comment: chronic issues stable, she does appear to have mild cellulitis of LLE which she has had in the past, she is not on blood thinners, she is on insulin and has CGS, difficult to assess functional status  Plan: EKG 12-lead complete w/read - Clinics,         Hemoglobin A1c  I've asked her to follow-up with me in one week re cellulitis  She will also let Dr. Zendejas know, may want to postpone until cleared up  She is otherwise cleared for procedure     (N39.42) Urinary incontinence without sensory awareness  Comment: severe and has tried medications  Plan:     (L03.116) Cellulitis of left leg  Comment: she has chronic lymphedema and skin changes but has noticed some pink color change proximally which is new x last few days, no fever/chills, keflex has worked for her in the past  Plan: cephALEXin (KEFLEX) 500 MG capsule  Elevate as much as possible  VV with me next week  May need to postpone if does not clear    (N18.32) Stage 3b chronic kidney disease (H)  Comment: creat  baseline 1.3  Plan: Urine Microscopic Exam  Avoid nephrotoxins    (E11.21) Diabetic kidney (H)  Comment:   Plan: Urine Microscopic Exam    (G47.33) ANNETTE (obstructive sleep apnea)  Comment:   Plan: please monitor 02 sats and cardiopulm status closely    (E11.69,  Z79.4) Type 2 diabetes mellitus with other specified complication, with long-term current use of insulin (H)  Comment: has GCM, went over med management in blessing-op period  Plan:     (A80.9) Poliomyelitis  Comment:   Plan:     (Z86.718) History of DVT (deep vein thrombosis)  Comment: picc-assoc  Plan:     (I87.2) Venous insufficiency  Comment:   Plan:     Risks and Recommendations:  The patient has the following additional risks and recommendations for perioperative complications:  Diabetes:  - Patient is on insulin therapy; diabetic NPO guidelines provided and discussed.    Antiplatelet or Anticoagulation Medication Instructions:   - aspirin: Discontinue aspirin 7-10 days prior to procedure to reduce bleeding risk. It should be resumed postoperatively.     Additional Medication Instructions:  Stop all NSAIDs (motrin, ibuprofen, naproxen, aleve, advil, naprosyn, aspirin)  for at least 5 days prior to surgery.    Stop Icosapent and biotin one week prior to surgery.    Don't take Jardiance three days prior to procedure    Don't take any novolog the am of surgery    Cut your basaglar (glargine) insulin dose down to 15 units the am of surgery.    Take carvedilol, pepcid, olmesartan, protonix and synthroid with a sip of water the am of surgery.    Take lasix, multivitamin, allegra, chlorthalidone after surgery the day of procedure.    Take the rest of your evening medications as usual the night before surgery.    Start keflex today    RECOMMENDATION:  APPROVAL GIVEN to proceed with proposed procedure, without further diagnostic evaluation.    Maude Ortiz PA-C  45 minutes on the day of the encounter doing chart review, history and exam, documentation  and further activities as noted above.          Subjective       HPI related to upcoming procedure:  Sammie is here for pre-op for bladder injections.  She has history of incontinence.    She is a type II diabetic.  She has a CGM.    She has history of lymphedema and has noticed some pink redness of left lower leg.    She does most of her own housekeeping.  She cannot walk up a flight of stairs due to right leg and back pain.    She denies history of MI, CVA or adverse reaction to anesthesia.    She has had picc-associated DVT in the past.        8/20/2023    11:09 PM   Preop Questions   1. Have you ever had a heart attack or stroke? No   2. Have you ever had surgery on your heart or blood vessels, such as a stent placement, a coronary artery bypass, or surgery on an artery in your head, neck, heart, or legs? No   3. Do you have chest pain with activity? No   4. Do you have a history of  heart failure? No   5. Do you currently have a cold, bronchitis or symptoms of other infection? UNKNOWN - wonders about cellulitis of left leg   6. Do you have a cough, shortness of breath, or wheezing? Chronic cough   7. Do you or anyone in your family have previous history of blood clots? YES - PICC associated   8. Do you or does anyone in your family have a serious bleeding problem such as prolonged bleeding following surgeries or cuts? No   9. Have you ever had problems with anemia or been told to take iron pills? YES -    10. Have you had any abnormal blood loss such as black, tarry or bloody stools, or abnormal vaginal bleeding? No   11. Have you ever had a blood transfusion? YES -    11a. Have you ever had a transfusion reaction? UNKNOWN -    12. Are you willing to have a blood transfusion if it is medically needed before, during, or after your surgery? Yes   13. Have you or any of your relatives ever had problems with anesthesia? No   14. Do you have sleep apnea, excessive snoring or daytime drowsiness? YES -    14a. Do you  have a CPAP machine? Yes   15. Do you have any artifical heart valves or other implanted medical devices like a pacemaker, defibrillator, or continuous glucose monitor? No   16. Do you have artificial joints? YES -    17. Are you allergic to latex? No       Health Care Directive:  Patient has a Health Care Directive on file      Review of Systems  CONSTITUTIONAL: NEGATIVE for fever, chills, change in weight  INTEGUMENTARY/SKIN: POSITIVE for left lower extremity redness  EYES: NEGATIVE for vision changes or irritation  ENT/MOUTH: NEGATIVE for ear, mouth and throat problems  RESP: NEGATIVE for significant cough or SOB  CV: NEGATIVE for chest pain, palpitations POSITIVE for lower extremity edema  GI: NEGATIVE for nausea, abdominal pain, heartburn, or change in bowel habits  : NEGATIVE for frequency, dysuria, or hematuria  HEME: NEGATIVE for bleeding problems  PSYCHIATRIC: NEGATIVE for changes in mood or affect    Patient Active Problem List    Diagnosis Date Noted    Mixed incontinence 08/16/2023     Priority: Medium    OAB (overactive bladder) 08/16/2023     Priority: Medium    Abdominal adhesions      Priority: Medium     s/p lysis      Episodic tension-type headache, not intractable 06/19/2023     Priority: Medium    Blurred vision 06/19/2023     Priority: Medium    Pain of cheek 06/19/2023     Priority: Medium    Atypical chest pain 05/22/2023     Priority: Medium    Lymphedema 02/03/2023     Priority: Medium    DVT of upper extremity (deep vein thrombosis) (H) 12/30/2022     Priority: Medium     Formatting of this note might be different from the original.  right      Venous insufficiency      Priority: Medium    Edema of lower extremity 09/19/2022     Priority: Medium    Mild major depression (H) 07/12/2022     Priority: Medium    Neuropathic pain 06/02/2022     Priority: Medium    Primary insomnia 11/24/2020     Priority: Medium    Moderate protein-calorie malnutrition (H) 11/21/2020     Priority: Medium     Status post total shoulder arthroplasty, left 11/21/2020     Priority: Medium    Diabetic nephropathy associated with type 2 diabetes mellitus (H) 09/02/2020     Priority: Medium    Vitamin D deficiency 09/02/2020     Priority: Medium    Renal artery stenosis (H) 07/31/2020     Priority: Medium    Avascular necrosis of bone of hip, left (H) 05/14/2020     Priority: Medium    Osteoarthritis of left hip 05/14/2020     Priority: Medium    CKD (chronic kidney disease) stage 3, GFR 30-59 ml/min (H) 04/17/2019     Priority: Medium    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     Priority: Medium    Morbid obesity with BMI of 40.0-44.9, adult (H) 08/01/2018     Priority: Medium    History of colonic polyps 05/31/2018     Priority: Medium    Iron deficiency anemia 05/31/2018     Priority: Medium    Surgical aftercare, musculoskeletal system 04/11/2018     Priority: Medium    History of pulmonary embolus (PE) 04/04/2018     Priority: Medium    Inguinal pain 03/20/2018     Priority: Medium    Right lower quadrant pain 03/20/2018     Priority: Medium    Normocytic anemia 01/16/2017     Priority: Medium    Allergic dermatitis due to other chemical product 10/02/2016     Priority: Medium    Insufficiency, arterial, peripheral (H) 11/08/2015     Priority: Medium     Mild seen on CARMITA 11/2015 - right sided      Type 2 diabetes mellitus with other specified complication (H) 10/18/2015     Priority: Medium    Carotid stenosis 12/09/2014     Priority: Medium     Mild increased stenosis 50-69% left ICA      Right shoulder pain 12/09/2014     Priority: Medium    Left knee pain 11/03/2014     Priority: Medium    Poliomyelitis      Priority: Medium     poor circulation right leg      Diabetic gastroparesis (H)      Priority: Medium    Dermatitis 02/15/2014     Priority: Medium    Acne rosacea 02/15/2014     Priority: Medium    Esophageal reflux 01/23/2014     Priority: Medium     Had upper endoscopy - Dr. Pantoja 2013      Dysphagia 09/16/2013      Priority: Medium    Pulmonary nodule 09/06/2013     Priority: Medium    Alopecia 02/09/2013     Priority: Medium     Problem list name updated by automated process. Provider to review      Papillary carcinoma of thyroid (H)      Priority: Medium     s/p thyroidectomy - Ruegemer      Gastroparesis 11/12/2012     Priority: Medium    Postsurgical hypothyroidism      Priority: Medium     s/p papillary thryoid cancer - Marj  Concern for possible recurrence 03/2014      Type 2 diabetes, HbA1c goal < 7% (H)      Priority: Medium    ACP (advance care planning) 09/21/2012     Priority: Medium     Discussed advance care planning with patient; information given to patient to review. 9/21/2012 Whit BROOKS LPN          Cavovarus deformity of foot 03/19/2012     Priority: Medium    History of DVT (deep vein thrombosis) 03/19/2012     Priority: Medium    Hypokalemia 03/19/2012     Priority: Medium    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) 03/19/2012     Priority: Medium    Anemia due to blood loss, acute 03/19/2012     Priority: Medium    Benign essential hypertension      Priority: Medium    Fibromyalgia      Priority: Medium    Allergic rhinitis      Priority: Medium     Problem list name updated by automated process. Provider to review      ANNETTE (obstructive sleep apnea)      Priority: Medium    Contact dermatitis 04/18/2011     Priority: Medium    Mixed hyperlipidemia 10/31/2010     Priority: Medium    Low back pain 07/15/2009     Priority: Medium      Past Medical History:   Diagnosis Date    Abdominal adhesions 1984, 96,99    s/p lysis    Abdominal adhesions     Acne rosacea     Allergic rhinitis     Allergic rhinitis, cause unspecified     Alopecia     Anemia     CAD (coronary artery disease)     Carotid stenosis     CKD (chronic kidney disease) stage 2, GFR 60-89 ml/min     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)     CPAP (continuous positive airway pressure) dependence     Depression     Diabetic gastroparesis (H)     Diet-controlled type 2 diabetes  mellitus (H)     DM2 (diabetes mellitus, type 2) (H)     DVT (deep venous thrombosis) (H)     DVT of axillary vein, acute right (H)     Fibromyalgia     Gastro-oesophageal reflux disease     GERD (gastroesophageal reflux disease)     Hernia of unspecified site of abdominal cavity without mention of obstruction or gangrene     bilateral    Hernia, abdominal     History of blood transfusion     10/ 1980    History of thrombophlebitis     HTN (hypertension)     HTN (hypertension)     Hypertriglyceridemia     Hypertriglyceridemia     Hypokalemia     Hypothyroidism     Mumps     Obstructive sleep apnea     ANNETTE (obstructive sleep apnea)     ANNETTE on CPAP     Osteoarthritis of glenohumeral joint     Papillary carcinoma of thyroid (H)     s/p thyroidectomy - Ruegemer    Papillary carcinoma of thyroid (H)     PE (pulmonary embolism)     Poliomyelitis     poor circulation right leg    Poliomyelitis     Postsurgical hypothyroidism     s/p papillary thryoid cancer - Ruegemer    Pulmonary embolism (H)     Pulmonary embolus (H)     Pulmonary nodule     Rosacea     S/P carpal tunnel release     bilateral    S/P hardware removal 01/2014    still with lingering foot pain    S/P shoulder surgery     bilateral    Septic joint (H)     right knee    Septic joint of right knee joint (H)     Venous insufficiency     Venous insufficiency     Venous thrombosis 1999    right axillary vein     Past Surgical History:   Procedure Laterality Date    AMPUTATE TOE(S)  03/15/2012    Procedure:AMPUTATE TOE(S); Surgeon:RUBEN MOSES; Location: OR    AMPUTATE TOE(S)      APPENDECTOMY  1972    APPENDECTOMY      ARTHRODESIS FOOT  03/15/2012    Procedure:ARTHRODESIS FOOT; RIGHT TRIPLE ARTHRODESIS, FIFTH TOE AMPUTATION, LATERAL LIGAMENT RECONSTRUCTION, TENDON TRANSFER AND RELEASE [MINI C-ARM, ARTHREX 4.5 AND 6.7 STAPLES, BIOCOMPOSITE TENODESIS SCREWS]; Surgeon:RUBEN MOSES; Location: OR    ARTHRODESIS FOOT Right     Right foot  triple arthrodesis and removal of hardware    ARTHROSCOPY SHOULDER  06/25/2015    REVISION SUBACROMIAL DECOMPRESSION, EXCISION OF GANGLION CYST, DEBRIDEMENT AND EXCISION OF THE GLENOHUMERAL JOINT GANGLION CYST, CORACOID DECOMPRESSION, POSSIBLE SUBSCAPULARIS REPAIR AND OPEN SUBSCAPULARIS BICEP    ARTHROSCOPY SHOULDER ROTATOR CUFF REPAIR      BIOPSY  Thyroid 2002    BLADDER SURGERY      BREAST SURGERY  Biopsy    CHOLECYSTECTOMY      CHOLECYSTECTOMY      COLONOSCOPY  2018    COLOSTOMY  02/07/2012    Procedure:COLOSTOMY; CREATION OF SIGMOID COLOSTOMY AND EXTENSIVE  LYSIS OF ADHESIONS; Surgeon:MONTSERRAT BENDER; Location: OR    COLOSTOMY      CYSTOSCOPY      EYE SURGERY      GENITOURINARY SURGERY  1999    GI SURGERY      weakened rectal sphincter with artificial stimulator    HERNIA REPAIR  1976    HYSTERECTOMY TOTAL ABDOMINAL      LAPAROTOMY, LYSIS ADHESIONS, COMBINED  02/07/2012    Procedure:COMBINED LAPAROTOMY, LYSIS ADHESIONS; Surgeon:MONTSERRAT BENDER; Location: OR    RELEASE CARPAL TUNNEL      RELEASE TENDON FOOT  03/15/2012    Procedure:RELEASE TENDON FOOT; Surgeon:SUKHDEEP METZ; Location: OR    REMOVE HARDWARE FOOT  12/13/2012    Procedure: REMOVE HARDWARE FOOT;  RIGHT FOOT REMOVAL OF HARDWARE;  Surgeon: Sukhdeep Metz MD;  Location:  OR    SHOULDER SURGERY  11/12/2020    LEFT SHOULDER HEMIARHTROPLASTY, BICEP TENODESIS    SOFT TISSUE SURGERY  2018, 2020    TENDON RELEASE      foot    THYROIDECTOMY      ZC FREEING BOWEL ADHESION,ENTEROLYSIS      1986, 1996, 1999    Z NONSPECIFIC PROCEDURE      throidectomy    ZC TOTAL ABDOM HYSTERECTOMY  1980    + BSO     Current Outpatient Medications   Medication Sig Dispense Refill    acetaminophen (TYLENOL) 500 MG tablet Take 1,000 mg by mouth      acetaminophen-caffeine (EXCEDRIN TENSION HEADACHE) 500-65 MG TABS Take 2 tablets by mouth every 6 hours as needed for mild pain 90 tablet 0    albuterol (PROAIR HFA/PROVENTIL HFA/VENTOLIN HFA) 108  (90 Base) MCG/ACT inhaler albuterol sulfate HFA 90 mcg/actuation aerosol inhaler      amoxicillin (AMOXIL) 500 MG capsule Takes 4 caps before every dental appointments.      aspirin (ASA) 81 MG EC tablet Take 81 mg by mouth daily      azelastine (ASTEPRO) 0.15 % nasal spray 1 spray      carvedilol (COREG) 12.5 MG tablet Take 1 tablet (12.5 mg) by mouth 2 times daily (with meals) 180 tablet 3    chlorthalidone (HYGROTON) 25 MG tablet Take 0.5 tablets (12.5 mg) by mouth every other day 45 tablet 3    clotrimazole (LOTRIMIN) 1 % cream Apply topically daily      Continuous Blood Gluc Sensor (FREESTYLE BRANDY 3 SENSOR) MISC 1 each every 14 days 6 each 3    diphenhydrAMINE-acetaminophen (TYLENOL PM)  MG tablet Take 1 tablet by mouth nightly as needed for sleep Reported on 3/20/2017      empagliflozin (JARDIANCE) 10 MG TABS tablet Take 1 tablet (10 mg) by mouth daily 90 tablet 1    ezetimibe (ZETIA) 10 MG tablet Take 1 tablet (10 mg) by mouth daily 90 tablet 3    famotidine (PEPCID) 20 MG tablet Take 2 tablets (40 mg) by mouth as needed 180 tablet 3    fenofibrate (TRICOR) 145 MG tablet Take 1 tablet (145 mg) by mouth daily 90 tablet 3    ferrous sulfate (IRON) 325 (65 FE) MG tablet Take 325 mg by mouth daily (with breakfast)      fexofenadine (ALLEGRA) 180 MG tablet Take 180 mg by mouth daily. 120 0    fluocinolone acetonide (DERMA SMOOTHE/FS BODY) 0.01 % external oil Apply 2 mL to scalp once per week. Massage into scalp. Can be left in overnight or washed out after 4-6 hours. 118.28 mL 5    fluticasone (FLONASE) 50 MCG/ACT spray Spray 2 sprays in nostril daily 2 sprays in each nostril qd 1 Bottle 0    furosemide (LASIX) 20 MG tablet Take 1 tablet (20 mg) by mouth daily as needed (lower extremity edema) 90 tablet 3    Icosapent Ethyl 1 g CAPS Take 1 g by mouth 2 times daily 60 capsule 11    insulin aspart (NOVOLOG PEN) 100 UNIT/ML pen Inject 5 Units Subcutaneous 3 times daily (with meals) 15 mL 3    insulin glargine  (BASAGLAR KWIKPEN) 100 UNIT/ML pen Inject 25 Units Subcutaneous daily 15 mL 4    insulin pen needle (B-D U/F) 31G X 5 MM miscellaneous Use 1 pen needles daily or as directed. 90 each 3    MULTIPLE VITAMIN PO Take 1 tablet by mouth      nitroGLYcerin (NITROSTAT) 0.4 MG sublingual tablet Place 1 tablet (0.4 mg) under the tongue every 5 minutes as needed for chest pain (no more than 3 in one hour; after 3rd, call 911.) 25 tablet 3    nystatin (MYCOSTATIN) cream Apply topically daily as needed       nystatin-triamcinolone (MYCOLOG II) cream Apply topically daily as needed       olmesartan (BENICAR) 40 MG tablet Take 1 tablet (40 mg) by mouth daily 30 tablet 3    ondansetron (ZOFRAN) 4 MG tablet Take 1 tablet (4 mg) by mouth every 6 hours as needed Reported on 3/20/2017 20 tablet 0    order for DME Equipment being ordered: Compression stockings - Knee High; 20-30 mmHg compression - note would like adhesive band to keep the stocking from sliding down 3 each 0    pantoprazole (PROTONIX) 40 MG EC tablet Take 40 mg by mouth 2 times daily      polyethylene glycol 400 (BLINK TEARS) 0.25 % SOLN ophthalmic solution       sucralfate (CARAFATE) 1 GM/10ML suspension Take 1 g by mouth 4 times daily      SYNTHROID 112 MCG tablet TAKE ONE-HALF TABLET BY MOUTH DAILY ON FRIDAYS AND TAKE ONE TABLET BY MOUTH ALL OTHER DAYS AS DIRECTED 90 tablet 3    tretinoin (RETIN-A) 0.025 % external cream Use every night as tolerated - spot treat lesion 20 g 3    triamcinolone (KENALOG) 0.1 % external ointment Apply topically every other day 80 g 3    trospium (SANCTURA XR) 60 MG CP24 24 hr capsule Take 1 capsule (60 mg) by mouth every morning 30 capsule 11    Vitamin D3 50 mcg (2000 units) tablet 1 tablet daily         Allergies   Allergen Reactions    Ketorolac Tromethamine Difficulty breathing     Shortness of breath    Nsaids Difficulty breathing     Increased creatinine    Celecoxib Itching and Rash    Morphine And Related Itching     With higher  doses    Crestor [Rosuvastatin] Muscle Pain (Myalgia)    No Clinical Screening - See Comments Itching     Fragrance    Vioxx Other (See Comments)     Heart races    Conjugated Estrogens Rash    Sulfa Antibiotics Rash        Social History     Tobacco Use    Smoking status: Never    Smokeless tobacco: Never   Substance Use Topics    Alcohol use: Never     Family History   Problem Relation Age of Onset    Arthritis Mother     Hypertension Mother     Cerebrovascular Disease Mother     Obesity Mother     Heart Disease Mother         MI's    Hypertension Father     Respiratory Father         Adult RDS    Diabetes Father     Arthritis Sister     Cancer Sister     Diabetes Sister     Hypertension Sister     Breast Cancer Sister     Depression Sister     Thyroid Disease Sister     Obesity Sister     Arthritis Sister     Hypertension Sister     Thyroid Disease Sister     Osteoporosis Sister     Obesity Sister     Cancer Sister         colon polup    Hypertension Sister     Osteoporosis Sister     Obesity Sister     Colon Cancer Sister     Lipids Sister     Obesity Sister     Obesity Maternal Grandmother         Dad s mother    Skin Cancer Maternal Grandmother         skin cancer unknown    Cancer Maternal Grandmother         unknown skin cancer on face    Obesity Paternal Grandmother         Mothers mother    Heart Disease Brother         MI at 54    Other Cancer Brother         Lung & bone    Lipids Brother     Hypertension Brother     Diabetes Brother     Hyperlipidemia Brother     Ovarian Cancer No family hx of      History   Drug Use Unknown         Objective     BP (!) 154/64   Pulse 64   Temp 97.1  F (36.2  C) (Temporal)   Resp 17   Ht 1.524 m (5')   Wt 95.7 kg (210 lb 14.4 oz)   SpO2 99%   BMI 41.19 kg/m        Physical Exam      GENERAL APPEARANCE: healthy, alert and no distress     EYES: no scleral icterus     HENT: OP clear mouth without ulcers or lesions     NECK: supple, no bruits     RESP: lungs clear to  auscultation - no rales, rhonchi or wheezes     CV: regular rates and rhythm, normal S1 S2, no S3 or S4 and no murmur, click or rub     ABDOMEN:  soft, nontender, no HSM or masses and bowel sounds normal     MS: extremities normal- no gross deformities noted, chronic pitting edema bilat lower ext, RLE braced, LLE with pink erythema just proximal to chronic skin darkening, no induration, non-tender     NEURO: Normal mentation, gait and speechnormal     PSYCH: mentation appears normal. and affect normal/bright      Recent Labs   Lab Test 06/28/23  1203 05/19/23  1148 04/12/23  1123 03/09/23  1103   HGB 11.7  --   --  11.9     --   --  257   * 136   < > 143   POTASSIUM 5.3 4.9   < > 5.4*   CR 1.20* 1.18*   < > 1.31*   A1C 7.9*  --   --  7.1*    < > = values in this interval not displayed.        Diagnostics:  Labs pending at this time.  Results will be reviewed when available.   EKG: sinus, rate 67    Revised Cardiac Risk Index (RCRI):  The patient has the following serious cardiovascular risks for perioperative complications:   - Diabetes Mellitus (on Insulin) = 1 point     RCRI Interpretation: 1 point: Class II (low risk - 0.9% complication rate)         Signed Electronically by: Maude Ortiz PA-C  Copy of this evaluation report is provided to requesting physician.

## 2023-08-27 LAB — BACTERIA UR CULT: NORMAL

## 2023-08-28 ENCOUNTER — TELEPHONE (OUTPATIENT)
Dept: UROLOGY | Facility: CLINIC | Age: 78
End: 2023-08-28
Payer: MEDICARE

## 2023-08-28 NOTE — RESULT ENCOUNTER NOTE
Gerard Cochran,    Your recent EKG was normal.    Please let us know if you have any questions or concerns.    Regards,  Maude Ortiz PA-C

## 2023-08-28 NOTE — RESULT ENCOUNTER NOTE
Gerard Cochran,     Here are your hemoglobin A1c results which have increased a little since your last check.  I'll also forward to Dr. Benoit for review.    Please let us know if you have any questions or concerns.    Regards,  Maude Ortiz PA-C

## 2023-08-30 ENCOUNTER — TELEPHONE (OUTPATIENT)
Dept: UROLOGY | Facility: CLINIC | Age: 78
End: 2023-08-30
Payer: MEDICARE

## 2023-08-30 ENCOUNTER — NURSE TRIAGE (OUTPATIENT)
Dept: FAMILY MEDICINE | Facility: CLINIC | Age: 78
End: 2023-08-30
Payer: MEDICARE

## 2023-08-30 NOTE — TELEPHONE ENCOUNTER
"Nurse Triage SBAR    Is this a 2nd Level Triage? YES, LICENSED PRACTITIONER REVIEW IS REQUIRED    Situation: Received a call from the patient with an update regarding the cellulitis in her L leg and L shoulder pain.     Background: Patient saw Maude on 8/25/23 and was prescribed Keflex for LLE cellulitis. Patient has taken Keflex in the past with positive results.     Assessment:     LLE Cellulitis: Patient states today she has noticed bumps present on the R and L leg. Bumps are tiny, \"look like a rash\", and hard to the touch. No bumps present on the upper part of the legs, bumps only present on the lower portion of the legs where the skin is red/pink in color. Patient also noted that the redness on the L leg has spread up to her knee. Patient has been taking the Keflex as prescribed. Patient states she is elevating her legs frequently throughout the day. No itchiness present. Legs do hurt somewhat but pain has not gotten worse since her appointment on 8/25/23. There was an area draining on the L leg but this has since scabbed over. No other areas draining. Leg is slightly swollen/puffy. At times, skin feels warm to the touch but temperature 97.9.     L Shoulder Pain: No numbness or tingling in the L arm. R arm seems normal. Symptoms started yesterday morning. No swelling or redness present in the arm. Took Tylenol: no relief. Heating pad overnight but this morning shoulder is still painful. No fall and did not hit shoulder on anything. Pain: 9/10. Going to the bathroom some SOB otherwise patient has not noticed anything breathing difficulties. No chest pain. No neck pain. Pain present on the front and back side of the shoulder. Pain is worse with movement. Does have some achiness present when not moving the shoulder.     Protocol Recommended Disposition:   Go To Office Now    Recommendation: Triage protocol recommending patient go to the office now. No available appointments. ADS? Will route to PCP for review and " recommendations.     Routed to provider    Does the patient meet one of the following criteria for ADS visit consideration? 16+ years old, with an [facility] PCP     TIP  Providers, please consider if this condition is appropriate for management at one of our Acute and Diagnostic Services sites.     If patient is a good candidate, please use dotphrase <dot>triageresponse and select Refer to ADS to document.    Can we leave a detailed message on this number? YES  Phone number patient can be reached at: Cell number on file:    Telephone Information:   Mobile [phone]       Reason for Disposition   SEVERE pain (e.g., excruciating, unable to do any normal activities)    Additional Information   Negative: Shock suspected (e.g., cold/pale/clammy skin, too weak to stand, low BP, rapid pulse)   Negative: Similar pain previously and it was from 'heart attack'   Negative: Similar pain previously and it was from 'angina' and not relieved by nitroglycerin   Negative: Sounds like a life-threatening emergency to the triager   Negative: Chest pain   Negative: Followed an injury to shoulder   Negative: Difficulty breathing or unusual sweating (e.g., sweating without exertion)   Negative: Pain lasting > 5 minutes and pain also present in chest  (Exception: Pain is clearly made worse by movement.)   Negative: Age > 40 and no obvious cause and pain even when not moving the arm  (Exception: Pain is clearly made worse by moving arm or bending neck.)   Negative: Red area or streak and fever   Negative: Swollen joint and fever   Negative: Entire arm is swollen   Negative: Patient sounds very sick or weak to the triager    Protocols used: Shoulder Pain-A-[place]    Alisson Almonte RN

## 2023-08-30 NOTE — TELEPHONE ENCOUNTER
Patient says she removed the dressings and her left lower leg. The area is now red per patient and the drainage has stopped. Patient denies pain. She is still taking cephalexin for 10 days. Explained to the patient Dr Zendejas wanted to wait to allow more time for healing of left leg cellulitis. Patient was okay with the plan.   Patient will be moved to 9/18/23    Purvi Hess RN, BSN  Care Coordinator Urology  [facility] [place]  [facility]  [phone]

## 2023-08-30 NOTE — TELEPHONE ENCOUNTER
Noted that she has not responded to oral antibiotics and possibly experiencing allergy - would recommend evaluation in the emergency department     Shola Benoit MD, MD

## 2023-08-30 NOTE — TELEPHONE ENCOUNTER
Writer called and spoke with patient and reviewed PCP's recommendation with patient who expressed verbal understanding and is agreeable, will go to emergency room for evaluation.    No further questions or concerns at this time.    Signing encounter.    Shravan Pollard RN  [facility]

## 2023-08-31 ENCOUNTER — TELEPHONE (OUTPATIENT)
Dept: FAMILY MEDICINE | Facility: CLINIC | Age: 78
End: 2023-08-31

## 2023-08-31 ENCOUNTER — APPOINTMENT (OUTPATIENT)
Dept: GENERAL RADIOLOGY | Facility: CLINIC | Age: 78
End: 2023-08-31
Attending: EMERGENCY MEDICINE
Payer: MEDICARE

## 2023-08-31 ENCOUNTER — HOSPITAL ENCOUNTER (OUTPATIENT)
Facility: CLINIC | Age: 78
Setting detail: OBSERVATION
Discharge: HOME | End: 2023-09-01
Attending: EMERGENCY MEDICINE | Admitting: HOSPITALIST
Payer: MEDICARE

## 2023-08-31 DIAGNOSIS — M25.512 LEFT ANTERIOR SHOULDER PAIN: ICD-10-CM

## 2023-08-31 DIAGNOSIS — I65.22 STENOSIS OF LEFT CAROTID ARTERY: Primary | ICD-10-CM

## 2023-08-31 DIAGNOSIS — L03.116 CELLULITIS OF LEFT LEG: ICD-10-CM

## 2023-08-31 DIAGNOSIS — L03.90 CELLULITIS: Primary | ICD-10-CM

## 2023-08-31 DIAGNOSIS — L03.119 CELLULITIS OF LOWER EXTREMITY, UNSPECIFIED LATERALITY: ICD-10-CM

## 2023-08-31 DIAGNOSIS — K21.9 GASTROESOPHAGEAL REFLUX DISEASE WITHOUT ESOPHAGITIS: ICD-10-CM

## 2023-08-31 LAB
ANION GAP SERPL CALCULATED.3IONS-SCNC: 10 MMOL/L (ref 7–15)
BASOPHILS # BLD AUTO: 0 10E3/UL (ref 0–0.2)
BASOPHILS NFR BLD AUTO: 0 %
BUN SERPL-MCNC: 28 MG/DL (ref 8–23)
CALCIUM SERPL-MCNC: 9.2 MG/DL (ref 8.8–10.2)
CHLORIDE SERPL-SCNC: 105 MMOL/L (ref 98–107)
CREAT SERPL-MCNC: 1.37 MG/DL (ref 0.51–0.95)
DEPRECATED HCO3 PLAS-SCNC: 25 MMOL/L (ref 22–29)
EOSINOPHIL # BLD AUTO: 0.2 10E3/UL (ref 0–0.7)
EOSINOPHIL NFR BLD AUTO: 3 %
ERYTHROCYTE [DISTWIDTH] IN BLOOD BY AUTOMATED COUNT: 12.6 % (ref 10–15)
GFR SERPL CREATININE-BSD FRML MDRD: 40 ML/MIN/1.73M2
GLUCOSE BLDC GLUCOMTR-MCNC: 125 MG/DL (ref 70–99)
GLUCOSE BLDC GLUCOMTR-MCNC: 126 MG/DL (ref 70–99)
GLUCOSE SERPL-MCNC: 194 MG/DL (ref 70–99)
HCT VFR BLD AUTO: 30.7 % (ref 35–47)
HGB BLD-MCNC: 10.3 G/DL (ref 11.7–15.7)
IMM GRANULOCYTES # BLD: 0 10E3/UL
IMM GRANULOCYTES NFR BLD: 0 %
LYMPHOCYTES # BLD AUTO: 1.8 10E3/UL (ref 0.8–5.3)
LYMPHOCYTES NFR BLD AUTO: 26 %
MAGNESIUM SERPL-MCNC: 2.2 MG/DL (ref 1.7–2.3)
MCH RBC QN AUTO: 31.2 PG (ref 26.5–33)
MCHC RBC AUTO-ENTMCNC: 33.6 G/DL (ref 31.5–36.5)
MCV RBC AUTO: 93 FL (ref 78–100)
MONOCYTES # BLD AUTO: 0.6 10E3/UL (ref 0–1.3)
MONOCYTES NFR BLD AUTO: 9 %
NEUTROPHILS # BLD AUTO: 4.2 10E3/UL (ref 1.6–8.3)
NEUTROPHILS NFR BLD AUTO: 62 %
NRBC # BLD AUTO: 0 10E3/UL
NRBC BLD AUTO-RTO: 0 /100
PLATELET # BLD AUTO: 182 10E3/UL (ref 150–450)
POTASSIUM SERPL-SCNC: 4.6 MMOL/L (ref 3.4–5.3)
RBC # BLD AUTO: 3.3 10E6/UL (ref 3.8–5.2)
SODIUM SERPL-SCNC: 140 MMOL/L (ref 136–145)
TROPONIN T SERPL HS-MCNC: 25 NG/L
TROPONIN T SERPL HS-MCNC: 26 NG/L
WBC # BLD AUTO: 6.9 10E3/UL (ref 4–11)

## 2023-08-31 PROCEDURE — 84484 ASSAY OF TROPONIN QUANT: CPT | Performed by: EMERGENCY MEDICINE

## 2023-08-31 PROCEDURE — 96365 THER/PROPH/DIAG IV INF INIT: CPT

## 2023-08-31 PROCEDURE — 36415 COLL VENOUS BLD VENIPUNCTURE: CPT | Performed by: EMERGENCY MEDICINE

## 2023-08-31 PROCEDURE — 80048 BASIC METABOLIC PNL TOTAL CA: CPT | Performed by: EMERGENCY MEDICINE

## 2023-08-31 PROCEDURE — 250N000011 HC RX IP 250 OP 636: Mod: JZ | Performed by: EMERGENCY MEDICINE

## 2023-08-31 PROCEDURE — 99285 EMERGENCY DEPT VISIT HI MDM: CPT | Mod: 25

## 2023-08-31 PROCEDURE — G0378 HOSPITAL OBSERVATION PER HR: HCPCS

## 2023-08-31 PROCEDURE — 85014 HEMATOCRIT: CPT | Performed by: EMERGENCY MEDICINE

## 2023-08-31 PROCEDURE — 250N000013 HC RX MED GY IP 250 OP 250 PS 637: Performed by: PHYSICIAN ASSISTANT

## 2023-08-31 PROCEDURE — 93005 ELECTROCARDIOGRAM TRACING: CPT

## 2023-08-31 PROCEDURE — 83735 ASSAY OF MAGNESIUM: CPT | Performed by: EMERGENCY MEDICINE

## 2023-08-31 PROCEDURE — 250N000013 HC RX MED GY IP 250 OP 250 PS 637: Performed by: EMERGENCY MEDICINE

## 2023-08-31 PROCEDURE — 73030 X-RAY EXAM OF SHOULDER: CPT | Mod: LT

## 2023-08-31 PROCEDURE — 99223 1ST HOSP IP/OBS HIGH 75: CPT | Mod: AI | Performed by: PHYSICIAN ASSISTANT

## 2023-08-31 PROCEDURE — 82962 GLUCOSE BLOOD TEST: CPT

## 2023-08-31 RX ORDER — NALOXONE HYDROCHLORIDE 0.4 MG/ML
0.2 INJECTION, SOLUTION INTRAMUSCULAR; INTRAVENOUS; SUBCUTANEOUS
Status: DISCONTINUED | OUTPATIENT
Start: 2023-08-31 | End: 2023-09-01 | Stop reason: HOSPADM

## 2023-08-31 RX ORDER — ONDANSETRON 4 MG/1
4 TABLET, ORALLY DISINTEGRATING ORAL EVERY 6 HOURS PRN
Status: DISCONTINUED | OUTPATIENT
Start: 2023-08-31 | End: 2023-09-01 | Stop reason: HOSPADM

## 2023-08-31 RX ORDER — ONDANSETRON 2 MG/ML
4 INJECTION INTRAMUSCULAR; INTRAVENOUS EVERY 6 HOURS PRN
Status: DISCONTINUED | OUTPATIENT
Start: 2023-08-31 | End: 2023-09-01 | Stop reason: HOSPADM

## 2023-08-31 RX ORDER — CEFAZOLIN SODIUM 2 G/100ML
2 INJECTION, SOLUTION INTRAVENOUS ONCE
Status: COMPLETED | OUTPATIENT
Start: 2023-08-31 | End: 2023-08-31

## 2023-08-31 RX ORDER — AMOXICILLIN 250 MG
2 CAPSULE ORAL 2 TIMES DAILY PRN
Status: DISCONTINUED | OUTPATIENT
Start: 2023-08-31 | End: 2023-09-01 | Stop reason: HOSPADM

## 2023-08-31 RX ORDER — LIDOCAINE 4 G/G
2 PATCH TOPICAL
Status: DISCONTINUED | OUTPATIENT
Start: 2023-08-31 | End: 2023-09-01 | Stop reason: HOSPADM

## 2023-08-31 RX ORDER — ACETAMINOPHEN 650 MG/1
650 SUPPOSITORY RECTAL EVERY 6 HOURS PRN
Status: DISCONTINUED | OUTPATIENT
Start: 2023-08-31 | End: 2023-09-01 | Stop reason: HOSPADM

## 2023-08-31 RX ORDER — ACETAMINOPHEN 325 MG/1
650 TABLET ORAL EVERY 6 HOURS PRN
Status: DISCONTINUED | OUTPATIENT
Start: 2023-08-31 | End: 2023-09-01 | Stop reason: HOSPADM

## 2023-08-31 RX ORDER — AMOXICILLIN 250 MG
1 CAPSULE ORAL 2 TIMES DAILY PRN
Status: DISCONTINUED | OUTPATIENT
Start: 2023-08-31 | End: 2023-09-01 | Stop reason: HOSPADM

## 2023-08-31 RX ORDER — LIDOCAINE 40 MG/G
CREAM TOPICAL
Status: DISCONTINUED | OUTPATIENT
Start: 2023-08-31 | End: 2023-09-01 | Stop reason: HOSPADM

## 2023-08-31 RX ORDER — NALOXONE HYDROCHLORIDE 0.4 MG/ML
0.4 INJECTION, SOLUTION INTRAMUSCULAR; INTRAVENOUS; SUBCUTANEOUS
Status: DISCONTINUED | OUTPATIENT
Start: 2023-08-31 | End: 2023-09-01 | Stop reason: HOSPADM

## 2023-08-31 RX ORDER — CEFAZOLIN SODIUM 2 G/100ML
2 INJECTION, SOLUTION INTRAVENOUS EVERY 8 HOURS
Status: DISCONTINUED | OUTPATIENT
Start: 2023-09-01 | End: 2023-09-01 | Stop reason: HOSPADM

## 2023-08-31 RX ORDER — DEXTROSE MONOHYDRATE 25 G/50ML
25-50 INJECTION, SOLUTION INTRAVENOUS
Status: DISCONTINUED | OUTPATIENT
Start: 2023-08-31 | End: 2023-09-01 | Stop reason: HOSPADM

## 2023-08-31 RX ORDER — NICOTINE POLACRILEX 4 MG
15-30 LOZENGE BUCCAL
Status: DISCONTINUED | OUTPATIENT
Start: 2023-08-31 | End: 2023-09-01 | Stop reason: HOSPADM

## 2023-08-31 RX ORDER — ACETAMINOPHEN 325 MG/1
975 TABLET ORAL ONCE
Status: COMPLETED | OUTPATIENT
Start: 2023-08-31 | End: 2023-08-31

## 2023-08-31 RX ADMIN — LIDOCAINE PATCH 4% 2 PATCH: 40 PATCH TOPICAL at 20:14

## 2023-08-31 RX ADMIN — ACETAMINOPHEN 975 MG: 325 TABLET, FILM COATED ORAL at 13:13

## 2023-08-31 RX ADMIN — CEFAZOLIN SODIUM 2 G: 2 INJECTION, SOLUTION INTRAVENOUS at 16:12

## 2023-08-31 ASSESSMENT — ACTIVITIES OF DAILY LIVING (ADL)
ADLS_ACUITY_SCORE: 40
ADLS_ACUITY_SCORE: 36
ADLS_ACUITY_SCORE: 40
ADLS_ACUITY_SCORE: 38
ADLS_ACUITY_SCORE: 40

## 2023-08-31 NOTE — H&P
[facility]    History and Physical - Hospitalist Service       Date of Admission:  8/31/2023    Assessment & Plan Chasity Hodgson is a 77 year old female with Pmhx of childhood polio, IDDM, CAD, CKD, DMII, multiple abdominal surgeries and colostomy, thyroid carcinoma, HTN, h/o PE & bilateral LE edema, chronic pain syndrome, who was admitted on 8/31/2023 after presenting for evaluation of pain in her left shoulder, also for concern regarding recent diagnosis cellulitis in LLE.    Non purulent Cellulitis of LLE   Presented with painful erythema, edema of LLE. Symptoms started 8/25, she had a small excoriation near ankle that may have started this now healed. Prescribed Keflex (8/27) diagnosed with cellulitis at pre op visit. Hx of lymphedema and cellulitis in lower extremities.   Clinically do not feel she has failed outpatient abx, has only had about 1.5 days of Keflex. The area appears mildly erythematous and is tender.  She is not febrile or septic.  Does not appear to be clinically worsening however has not resolved as she had expected.  Exam is consistent with mild erysipelas or strep etiology.  She has hx of MRSA in left finger related to prior surgery back in 2022 had hardware removed, treated with Clinda.  Has episodes of cellulitis have resolved with treatment of Keflex.  -admit obs   -Elevate left lower extremity  -Is prescribed Lasix 20 mg twice a week for lower extremity edema and lymphedema care, continue.  If increased swelling could consider going up to 40 mg every other day as previously recommended by nephrologist  -outline erythema, isolation precautions per protocol   -monitor temp, analgesia as needed  -abx: continue IV ancef for tonight and monitor. If fails to improve or worsens change to clindamycin. Likely discharge on 10 addtl days Keflex to complete course.  She has plans to follow-up with primary care to check in for improvement prior to having urologic surgery in  "September.  -Recommend outpatient follow-up for consideration of lymphedema treatments, compression stockings       Left Shoulder pain  Prior Hemiarthroplasty 2020, 2 prior arthroscopies and biceps resection  Main reason for presentation, worsening pain in her left shoulder over the past 48 hours.  History of chronic shoulder issues at least since 2018 in both shoulders.  Follows with Dr Maisha Medina.   Imaging shows Anterior subluxation of the left glenohumeral joint.   Ortho curb sided from ED outpatient ED who agreed to see the patient in consult if pain uncontrolled or concerns while admitted.   - activity, weight bear as tolerated, lift no greater than 5 pounds in left upper extremity  -Analgesia: Topical anesthesia and ice, Tylenol, avoid NSAIDs.  Would not recommend steroids for this nature of pain.  Low-dose Dilaudid for breakthrough pain  -Lives alone in [facility] thus requested PT/ OT/SW evaluations for disposition  -Pain with better pain control and therapy evals home with home care, outpatient close follow-up with her orthopedic team at [facility]      Chronic Pain Syndrome  Hx of Right foot drop from polio  Hx of chronic \"widespread\" pain, history of DJD neurogenic claudication follows with a spine team at [facility] spine and sees orthopedic provider Dr. Christy at [facility], encourage Max. Also follows with pain team start Lyrica previously that caused edema, usually treated with topical agents.   -Management, therapy consults as above    CKD   HTN  Creatinine is near baseline around 1.2-1.4.  Follows with nephrology for history of suggestive renal artery stenosis.  -Continue PTA to prior to admission blood pressure medications  -No NSAIDs for pain control    Non Obstructive CAD   No chest pain, angina equivalents, dyspnea.  Troponin was checked in triage due to left shoulder pain and flat very minimally detected.  No acute ischemic changes on EKG.  She has absolutely no cardiac symptoms.  She had a coronary " "angiogram in 2018 that showed minimal coronary artery disease and a reassuring nuc med stress test in April of this year.  -continue medical management     Flat troponin   - no further work up     Type II DM  Last A1c 8.7 on insulin   -Continue prior to admission insulin regimen, sliding scale as needed.  Awaiting med rec.    ANNETTE  -CPAP resume if able to supply as observation.  Otherwise supplement oxygen as needed       Diet:  Regular    DVT Prophylaxis: Enoxaparin (Lovenox) SQ  Bello Catheter: Not present    Cardiac Monitoring: None    Code Status:  Full Code     Clinically Significant Risk Factors Present on Admission                # Drug Induced Platelet Defect: home medication list includes an antiplatelet medication   # Hypertension: Noted on problem list     # DMII: A1C = 8.7 % (Ref range: 0.0 - 5.6 %) within past 6 months   # Severe Obesity: Estimated body mass index is 40.04 kg/m  as calculated from the following:    Height as of this encounter: 1.524 m (5').    Weight as of this encounter: 93 kg (205 lb).              Disposition Plan      Expected Discharge Date: 09/01/2023,  3:00 PM      Discharge Comments: pending improved pain control, PT/OT carlos. Resides in [facility] home, uses walker.            Carline Molina PA-C    ______________________________________________________________________    Chief Complaint   \"Wound infection\"     History is obtained from the patient    History of Present Illness   Chasity Hodgson is a 77 year old female who presented to the ED for \"bilateral cellulitis and left shoulder pain\".    She reports history of left shoulder surgery in 2020 with Adam Gan. Since shoulder surgery she has had intermittent pain across her left bicep, shoulder with activity and usually lifts no more than 5 lbs.     On 8/29 she woke up with more stiffness and pain in her left shoulder than usual.  She had no pain into her neck or chest pain.  No shortness of breath.  She denied any new " "injury to her left shoulder.  She is right-hand dominant uses a walker in [place].  No meds tried for pain.    She presented to the ED today given concern about her recent diagnosis of cellulitis in her leg and due to the pain in her left shoulder. She started Keflex PM of 8/29 she previously had a small scratch on her left anterior shin that had healed.  Her left anterior shin was warm and red so she presented to the emergency department.  She is able to stand and bear weight in her leg.  There has been no drainage from the skin or fever.      In the ED given tylenol, 2 gram Ancef.     Admission was requested from hospitalist service for \"cellulitis, elevated troponin\". ED provider and patient felt uncomfortable discharging the patient as she lives alone, there was concern about pain control for her left shoulder.      Past Medical History    Past Medical History:   Diagnosis Date    Abdominal adhesions 1984, 96,99    s/p lysis    Abdominal adhesions     Acne rosacea     Allergic rhinitis     Allergic rhinitis, cause unspecified     Alopecia     Anemia     CAD (coronary artery disease)     Carotid stenosis     CKD (chronic kidney disease) stage 2, GFR 60-89 ml/min     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)     CPAP (continuous positive airway pressure) dependence     Depression     Diabetic gastroparesis (H)     Diet-controlled type 2 diabetes mellitus (H)     DM2 (diabetes mellitus, type 2) (H)     DVT (deep venous thrombosis) (H)     DVT of axillary vein, acute right (H)     Fibromyalgia     Gastro-oesophageal reflux disease     GERD (gastroesophageal reflux disease)     Hernia of unspecified site of abdominal cavity without mention of obstruction or gangrene     bilateral    Hernia, abdominal     History of blood transfusion     10/ 1980    History of thrombophlebitis     HTN (hypertension)     HTN (hypertension)     Hypertriglyceridemia     Hypertriglyceridemia     Hypokalemia     Hypothyroidism     Mumps     Obstructive " sleep apnea     ANNETTE (obstructive sleep apnea)     ANNETTE on CPAP     Osteoarthritis of glenohumeral joint     Papillary carcinoma of thyroid (H)     s/p thyroidectomy - Ruegemer    Papillary carcinoma of thyroid (H)     PE (pulmonary embolism)     Poliomyelitis     poor circulation right leg    Poliomyelitis     Postsurgical hypothyroidism     s/p papillary thryoid cancer - Ruegemer    Pulmonary embolism (H)     Pulmonary embolus (H)     Pulmonary nodule     Rosacea     S/P carpal tunnel release     bilateral    S/P hardware removal 01/2014    still with lingering foot pain    S/P shoulder surgery     bilateral    Septic joint (H)     right knee    Septic joint of right knee joint (H)     Venous insufficiency     Venous insufficiency     Venous thrombosis 1999    right axillary vein       Past Surgical History   Past Surgical History:   Procedure Laterality Date    AMPUTATE TOE(S)  03/15/2012    Procedure:AMPUTATE TOE(S); Surgeon:RUBEN MOSES; Location: OR    AMPUTATE TOE(S)      APPENDECTOMY  1972    APPENDECTOMY      ARTHRODESIS FOOT  03/15/2012    Procedure:ARTHRODESIS FOOT; RIGHT TRIPLE ARTHRODESIS, FIFTH TOE AMPUTATION, LATERAL LIGAMENT RECONSTRUCTION, TENDON TRANSFER AND RELEASE [MINI C-ARM, ARTHREX 4.5 AND 6.7 STAPLES, BIOCOMPOSITE TENODESIS SCREWS]; Surgeon:RUBEN MOSES; Location:[facility] OR    ARTHRODESIS FOOT Right     Right foot triple arthrodesis and removal of hardware    ARTHROSCOPY SHOULDER  06/25/2015    REVISION SUBACROMIAL DECOMPRESSION, EXCISION OF GANGLION CYST, DEBRIDEMENT AND EXCISION OF THE GLENOHUMERAL JOINT GANGLION CYST, CORACOID DECOMPRESSION, POSSIBLE SUBSCAPULARIS REPAIR AND OPEN SUBSCAPULARIS BICEP    ARTHROSCOPY SHOULDER ROTATOR CUFF REPAIR      BIOPSY  Thyroid 2002    BLADDER SURGERY      BREAST SURGERY  Biopsy    CHOLECYSTECTOMY      CHOLECYSTECTOMY      COLONOSCOPY  2018    COLOSTOMY  02/07/2012    Procedure:COLOSTOMY; CREATION OF SIGMOID COLOSTOMY AND EXTENSIVE   LYSIS OF ADHESIONS; Surgeon:MONTSERRAT BENDER; Location:[facility] OR    COLOSTOMY      CYSTOSCOPY      EYE SURGERY      GENITOURINARY SURGERY      GI SURGERY      weakened rectal sphincter with artificial stimulator    HERNIA REPAIR      HYSTERECTOMY TOTAL ABDOMINAL      LAPAROTOMY, LYSIS ADHESIONS, COMBINED  2012    Procedure:COMBINED LAPAROTOMY, LYSIS ADHESIONS; Surgeon:MONTSERRAT BENDER; Location:[facility] OR    RELEASE CARPAL TUNNEL      RELEASE TENDON FOOT  03/15/2012    Procedure:RELEASE TENDON FOOT; Surgeon:SUKHDEEP METZ; Location: OR    REMOVE HARDWARE FOOT  2012    Procedure: REMOVE HARDWARE FOOT;  RIGHT FOOT REMOVAL OF HARDWARE;  Surgeon: Sukhdeep Metz MD;  Location:  OR    SHOULDER SURGERY  2020    LEFT SHOULDER HEMIARHTROPLASTY, BICEP TENODESIS    SOFT TISSUE SURGERY  2020    TENDON RELEASE      foot    THYROIDECTOMY      ZZC FREEING BOWEL ADHESION,ENTEROLYSIS      , ,     ZZC NONSPECIFIC PROCEDURE      throidectomy    ZZC TOTAL ABDOM HYSTERECTOMY      + BSO       Prior to Admission Medications   Prior to Admission Medications   Prescriptions Last Dose Informant Patient Reported? Taking?   Continuous Blood Gluc Sensor (FREESTYLE BRANDY 3 SENSOR) [facility]   No No   Si each every 14 days   Icosapent Ethyl 1 g CAPS   No No   Sig: Take 1 g by mouth 2 times daily   MULTIPLE VITAMIN PO   Yes No   Sig: Take 1 tablet by mouth   SYNTHROID 112 MCG tablet   No No   Sig: TAKE ONE-HALF TABLET BY MOUTH DAILY ON  AND TAKE ONE TABLET BY MOUTH ALL OTHER DAYS AS DIRECTED   Vitamin D3 50 mcg (2000 units) tablet   Yes No   Si tablet daily   acetaminophen (TYLENOL) 500 MG tablet   Yes No   Sig: Take 1,000 mg by mouth   acetaminophen-caffeine (EXCEDRIN TENSION HEADACHE) 500-65 MG TABS   No No   Sig: Take 2 tablets by mouth every 6 hours as needed for mild pain   albuterol (PROAIR HFA/PROVENTIL HFA/VENTOLIN HFA) 108 (90 Base) MCG/ACT inhaler   Yes No    Sig: albuterol sulfate HFA 90 mcg/actuation aerosol inhaler   amoxicillin (AMOXIL) 500 MG capsule   Yes No   Sig: Takes 4 caps before every dental appointments.   aspirin (ASA) 81 MG EC tablet   Yes No   Sig: Take 81 mg by mouth daily   azelastine (ASTEPRO) 0.15 % nasal spray   Yes No   Si spray   carvedilol (COREG) 12.5 MG tablet   No No   Sig: Take 1 tablet (12.5 mg) by mouth 2 times daily (with meals)   cephALEXin (KEFLEX) 500 MG capsule   No No   Sig: Take 1 capsule (500 mg) by mouth 3 times daily   chlorthalidone (HYGROTON) 25 MG tablet   No No   Sig: Take 0.5 tablets (12.5 mg) by mouth every other day   clotrimazole (LOTRIMIN) 1 % cream   Yes No   Sig: Apply topically daily   diphenhydrAMINE-acetaminophen (TYLENOL PM)  MG tablet   Yes No   Sig: Take 1 tablet by mouth nightly as needed for sleep Reported on 3/20/2017   empagliflozin (JARDIANCE) 10 MG TABS tablet   No No   Sig: Take 1 tablet (10 mg) by mouth daily   ezetimibe (ZETIA) 10 MG tablet   No No   Sig: Take 1 tablet (10 mg) by mouth daily   famotidine (PEPCID) 20 MG tablet   No No   Sig: Take 2 tablets (40 mg) by mouth as needed   fenofibrate (TRICOR) 145 MG tablet   No No   Sig: Take 1 tablet (145 mg) by mouth daily   ferrous sulfate (IRON) 325 (65 FE) MG tablet   Yes No   Sig: Take 325 mg by mouth daily (with breakfast)   fexofenadine (ALLEGRA) 180 MG tablet   Yes No   Sig: Take 180 mg by mouth daily.   fluocinolone acetonide (DERMA SMOOTHE/FS BODY) 0.01 % external oil   No No   Sig: Apply 2 mL to scalp once per week. Massage into scalp. Can be left in overnight or washed out after 4-6 hours.   fluticasone (FLONASE) 50 MCG/ACT spray   No No   Sig: Spray 2 sprays in nostril daily 2 sprays in each nostril qd   furosemide (LASIX) 20 MG tablet   No No   Sig: Take 1 tablet (20 mg) by mouth daily as needed (lower extremity edema)   insulin aspart (NOVOLOG PEN) 100 UNIT/ML pen   No No   Sig: Inject 5 Units Subcutaneous 3 times daily (with meals)    insulin glargine (BASAGLAR KWIKPEN) 100 UNIT/ML pen   No No   Sig: Inject 25 Units Subcutaneous daily   insulin pen needle (B-D U/F) 31G X 5 MM miscellaneous   No No   Sig: Use 1 pen needles daily or as directed.   nitroGLYcerin (NITROSTAT) 0.4 MG sublingual tablet   No No   Sig: Place 1 tablet (0.4 mg) under the tongue every 5 minutes as needed for chest pain (no more than 3 in one hour; after 3rd, call 911.)   nystatin (MYCOSTATIN) cream   Yes No   Sig: Apply topically daily as needed    nystatin-triamcinolone (MYCOLOG II) cream   Yes No   Sig: Apply topically daily as needed    olmesartan (BENICAR) 40 MG tablet   No No   Sig: Take 1 tablet (40 mg) by mouth daily   ondansetron (ZOFRAN) 4 MG tablet   No No   Sig: Take 1 tablet (4 mg) by mouth every 6 hours as needed Reported on 3/20/2017   order for DME   No No   Sig: Equipment being ordered: Compression stockings - Knee High; 20-30 mmHg compression - note would like adhesive band to keep the stocking from sliding down   pantoprazole (PROTONIX) 40 MG EC tablet   Yes No   Sig: Take 40 mg by mouth 2 times daily   polyethylene glycol 400 (BLINK TEARS) 0.25 % SOLN ophthalmic solution   Yes No   sucralfate (CARAFATE) 1 GM/10ML suspension   Yes No   Sig: Take 1 g by mouth 4 times daily   tretinoin (RETIN-A) 0.025 % external cream   No No   Sig: Use every night as tolerated - spot treat lesion   triamcinolone (KENALOG) 0.1 % external ointment   No No   Sig: Apply topically every other day      Facility-Administered Medications: None        Review of Systems    The 10 point Review of Systems is negative other than noted in the HPI or here.     Social History   I have reviewed this patient's social history and updated it with pertinent information if needed.  Social History     Tobacco Use    Smoking status: Never    Smokeless tobacco: Never   Vaping Use    Vaping Use: Never used   Substance Use Topics    Alcohol use: Never    Drug use: Never        Physical Exam   Vital  Signs: Temp: 97.9  F (36.6  C) Temp src: Temporal BP: (!) 171/79 Pulse: 59   Resp: 18 SpO2: 97 % O2 Device: None (Room air)    Weight: 205 lbs 0 oz    Constitutional: Awake, alert,  no apparent distress.  Eyes: Conjunctiva and pupils examined and normal.  HEENT: Moist mucous membranes, normal dentition.  Respiratory: Clear to auscultation bilaterally, no crackles or wheezing.  Cardiovascular: Regular rate and rhythm, normal S1 and S2, and no murmur noted.  GI: Soft, colostomy in place. non-distended, non-tender, bowel sounds present. No rebound tenderness or guarding.  Lymph/Hematologic: No anterior cervical or supraclavicular adenopathy.  Skin: Mild circumferential lower extremity erythema and edema left shin not surpassing posterior knee. No streaking.  No weeping or open wounds.    Musculoskeletal: Surgical deformity and mild edema proximal left shoulder. Limited ROM with abduction, adduction. Tender to palpation over deltoid and bicep. Moving all extremities.  Neurologic: No focal deficits noted. Speech is clear. Coordination and strength grossly normal.   Psychiatric: Appropriate affect.       Medical Decision Making       75 MINUTES SPENT BY ME on the date of service doing chart review, history, exam, documentation & further activities per the note.      Data   ------------------------- PAST 24 HR DATA REVIEWED -----------------------------------------------

## 2023-08-31 NOTE — ED NOTES
[facility]  ED Nurse Handoff Report    ED Chief complaint: Wound Infection  . ED Diagnosis:   Final diagnoses:   None       Allergies:   Allergies   Allergen Reactions    Ketorolac Tromethamine Difficulty breathing     Shortness of breath    Nsaids Difficulty breathing     Increased creatinine    Celecoxib Itching and Rash    Morphine And Related Itching     With higher doses    Crestor [Rosuvastatin] Muscle Pain (Myalgia)    No Clinical Screening - See Comments Itching     Fragrance    Vioxx Other (See Comments)     Heart races    Conjugated Estrogens Rash    Sulfa Antibiotics Rash       Code Status: Full Code    Activity level - Baseline/Home:  independent.  Activity Level - Current:   assist of 1 and walker.   Lift room needed: No.   Bariatric: No   Needed: No   Isolation: No.   Infection: Not Applicable.     Respiratory status: Room air    Vital Signs (within 30 minutes):   Vitals:    08/31/23 0945 08/31/23 1145   BP: (!) 195/81 (!) 171/79   Pulse: 60 59   Resp: 18    Temp: 97.9  F (36.6  C)    TempSrc: Temporal    SpO2: 100% 97%   Weight: 93 kg (205 lb)    Height: 1.524 m (5')        Cardiac Rhythm:  ,      Pain level:    Patient confused: No.   Patient Falls Risk: nonskid shoes/slippers when out of bed, arm band in place, and patient and family education.   Elimination Status: Has voided     Patient Report - Initial Complaint: cellulitis of the lower extremities and left shoulder pain.   Focused Assessment: pt comes to the ED with left shoulder pain and bilateral lower leg cellulitis, pt has been on keflex for a few days but the cellulitis seems to be getting worse, per pt.  She is also complaining of pain in the left shoulder, she just received an Xray     Abnormal Results:   Labs Ordered and Resulted from Time of ED Arrival to Time of ED Departure   BASIC METABOLIC PANEL - Abnormal       Result Value    Sodium 140      Potassium 4.6      Chloride 105      Carbon Dioxide  (CO2) 25      Anion Gap 10      Urea Nitrogen 28.0 (*)     Creatinine 1.37 (*)     Calcium 9.2      Glucose 194 (*)     GFR Estimate 40 (*)    TROPONIN T, HIGH SENSITIVITY - Abnormal    Troponin T, High Sensitivity 26 (*)    CBC WITH PLATELETS AND DIFFERENTIAL - Abnormal    WBC Count 6.9      RBC Count 3.30 (*)     Hemoglobin 10.3 (*)     Hematocrit 30.7 (*)     MCV 93      MCH 31.2      MCHC 33.6      RDW 12.6      Platelet Count 182      % Neutrophils 62      % Lymphocytes 26      % Monocytes 9      % Eosinophils 3      % Basophils 0      % Immature Granulocytes 0      NRBCs per 100 WBC 0      Absolute Neutrophils 4.2      Absolute Lymphocytes 1.8      Absolute Monocytes 0.6      Absolute Eosinophils 0.2      Absolute Basophils 0.0      Absolute Immature Granulocytes 0.0      Absolute NRBCs 0.0     TROPONIN T, HIGH SENSITIVITY - Abnormal    Troponin T, High Sensitivity 25 (*)    MAGNESIUM - Normal    Magnesium 2.2          XR Shoulder Left G/E 3 Views   Final Result   IMPRESSION:   1.  Probable left glenohumeral hemiarthroplasty with proximal humeral   endoprosthesis. The component is well seated without evidence of   loosening.   2.  Anterior subluxation of the left glenohumeral joint.   3.  No fracture.           GURU WARD MD            SYSTEM ID:  [id number]          Treatments provided: labs, imaging  Family Comments: no family with the pt  OBS brochure/video discussed/provided to patient:  No, Ed nurse says it was shown  ED Medications:   Medications   ceFAZolin (ANCEF) 2 g in 100 mL D5W intermittent infusion (has no administration in time range)   acetaminophen (TYLENOL) tablet 975 mg (975 mg Oral $Given 8/31/23 1313)       Drips infusing:  No  For the majority of the shift this patient was Green.   Interventions performed were n/a.    Sepsis treatment initiated: No    Cares/treatment/interventions/medications to be completed following ED care: see hospitalist notes. Left shoulder is sore and will  probably need pain meds to sleep    ED Nurse Name: Josie Al RN  2:32 PM  RECEIVING UNIT ED HANDOFF REVIEW    Above ED Nurse Handoff Report was reviewed: Yes  Reviewed by: Karey Dos Santos RN on August 31, 2023 at 5:27 PM

## 2023-08-31 NOTE — ED TRIAGE NOTES
Pt states she was started on Keflex on Tuesday for cellulitis of LLE. States it's spreading up to her knee now. Denies fevers. Also c/o L shoulder pain. Denies injury. CMS intact. ABC intact. A&Ox4.

## 2023-08-31 NOTE — ED PROVIDER NOTES
History     Chief Complaint:  Wound Infection       HPI   Chasity Hodgson is a 77 year old female with a history of diabetes presenting for bilateral leg cellulitis and left shoulder pain. Patient was in the ED on Tuesday (8/29) and was diagnosed with cellulits of the legs and was prescribed Keflex. Patient states the redness has gotten worse and more painful. Patient has no wounds on her feet. Patient reports chills at night. Patient also reports left shoulder pain that started on Tuesday (8/29). She states that she did not sustain any fall or injury to the area. Patient is s/p left shoulder replacement. Patient states the shoulder pain is new.     Independent Historian:   None - Patient Only    Review of External Notes:   Family practice note from 8/25/2023 reviewed.  The patient was noted to have worsening erythema of the left leg and was started on Keflex.    Medications:    Albuterol inhaler   Aspirin   Astepro   Coreg   Keflex   Chlorthalidone   Continuous blood glucose sensor   Jardiance   Zetia  Pepcid   Tricor   Iron   Allegra   Flonase   Lasix   Icosapent ethyl   Insulin aspart   Insulin glargine   Nitroglycerin  Nystatin  Mycolog   Benicar   Zofran   Protonix   Carafate   Synthroid     Past Medical History:    Abdominal adhesions   Alopecia  Anemia   CAD  Carotid stenosis  Chronic kidney disease stage 2   Colostomy in place   CPAP dependence   Depression   Diabetic gastroparesis   Type 2 diabetes   Deep venous thrombosis  Fibromyalgia  GERD   Hernia  Blood transfusion   Thrombophlebitis   Hypertension   Hypertriglyceridemia   Hypokalemia   Hypothyroidism   Mumps   ANNETTE on CPAP  Osteoarthritis of glenohumeral joint   Papillary carcinoma of thyroid   Pulmonary embolism   Poliomyelitis   Pulmonary embolus   Pulmonary nodule   Septic joint   Venous insufficiency   Venous thrombosis    Past Surgical History:    Amputate toes   Appendectomy   Arthrodesis foot   Arthroscopy shoulder   Breast surgery    Cholecystectomy   Colonoscopy   Colostomy   Cystoscopy   Eye surgery   Genitourinary surgery   Hernia repair  Hysterectomy total bilateral  Laparotomy, lysis adhesion, combined   Release carpal tunnel  Release tendon, foot   Remove hardware, foot   Shoulder surgery   Soft tissue surgery   Thyroidectomy   Freeing bowel adhesions, enterolysis     Physical Exam   Patient Vitals for the past 24 hrs:   BP Temp Temp src Pulse Resp SpO2 Height Weight   08/31/23 1430 (!) 152/65 -- -- 59 -- 98 % -- --   08/31/23 1145 (!) 171/79 -- -- 59 -- 97 % -- --   08/31/23 0945 (!) 195/81 97.9  F (36.6  C) Temporal 60 18 100 % 1.524 m (5') 93 kg (205 lb)        Physical Exam  General: Laying on the ED bed  HEENT: Normocephalic, atraumatic  Cardiac: Left radial 2+, regular rate and rhythm  Pulm: Breathing comfortably, no accessory muscle usage, no conversational dyspnea  MSK: No bony deformities.  Tender to palpation over the anterior left shoulder without overlying erythema, wound, swelling.  Nonpainful passive rotation of the left shoulder joint, pain with passive flexion of the left shoulder joint.  1+ edema BLE, blanching erythema over the anterior LLE up to the mid shin.  No wound or purulent drainage.  Small area of erythema with corresponding tenderness over the right anterior shin.  No wounds over the plantar surfaces of bilateral feet.  Skin: Warm and dry  Neuro: Moves all extremities  Psych: Pleasant mood and affect      Emergency Department Course   ECG  ECG taken at 1146, ECG read at 1148  Sinus bradycardia   Possible left atrial enlargement   Borderline ECG   No significant change as compared to prior, dated 4/14/23.  Rate 51 bpm. AL interval 160 ms. QRS duration 94 ms. QT/QTc 436/401 ms. P-R-T axes 30 5 37.     Imaging:  XR Shoulder Left G/E 3 Views   Final Result   IMPRESSION:   1.  Probable left glenohumeral hemiarthroplasty with proximal humeral   endoprosthesis. The component is well seated without evidence of    loosening.   2.  Anterior subluxation of the left glenohumeral joint.   3.  No fracture.           GURU WARD MD            SYSTEM ID:  [id number]         Report per radiology    Laboratory:  Labs Ordered and Resulted from Time of ED Arrival to Time of ED Departure   BASIC METABOLIC PANEL - Abnormal       Result Value    Sodium 140      Potassium 4.6      Chloride 105      Carbon Dioxide (CO2) 25      Anion Gap 10      Urea Nitrogen 28.0 (*)     Creatinine 1.37 (*)     Calcium 9.2      Glucose 194 (*)     GFR Estimate 40 (*)    TROPONIN T, HIGH SENSITIVITY - Abnormal    Troponin T, High Sensitivity 26 (*)    CBC WITH PLATELETS AND DIFFERENTIAL - Abnormal    WBC Count 6.9      RBC Count 3.30 (*)     Hemoglobin 10.3 (*)     Hematocrit 30.7 (*)     MCV 93      MCH 31.2      MCHC 33.6      RDW 12.6      Platelet Count 182      % Neutrophils 62      % Lymphocytes 26      % Monocytes 9      % Eosinophils 3      % Basophils 0      % Immature Granulocytes 0      NRBCs per 100 WBC 0      Absolute Neutrophils 4.2      Absolute Lymphocytes 1.8      Absolute Monocytes 0.6      Absolute Eosinophils 0.2      Absolute Basophils 0.0      Absolute Immature Granulocytes 0.0      Absolute NRBCs 0.0     TROPONIN T, HIGH SENSITIVITY - Abnormal    Troponin T, High Sensitivity 25 (*)    MAGNESIUM - Normal    Magnesium 2.2          Emergency Department Course & Assessments:  Interventions:  Medications   ceFAZolin (ANCEF) 2 g in 100 mL D5W intermittent infusion (has no administration in time range)   acetaminophen (TYLENOL) tablet 975 mg (975 mg Oral $Given 8/31/23 1313)      Independent Interpretation (X-rays, CTs, rhythm strip):  Left shoulder film with hemiarthroplasty, no acute fracture or dislocation.    Assessments/Consultations/Discussion of Management or Tests:   ED Course as of 08/31/23 1535   Thu Aug 31, 2023   1030 I examined the patient and obtained history as noted above.     1435 I spoke to Carline regarding the  patient. She accepted her on behalf of Dr. Andrews.     Social Determinants of Health affecting care:   None    Disposition:  The patient was admitted to the hospital under the care of Dr. Andrews.     Impression & Plan      Medical Decision Makin-year-old female presents with left shoulder pain and lower extremity cellulitis.  Unclear etiology of the left shoulder pain, no traumatic injury, no overlying evidence of infection.  She does have a prosthetic shoulder.  X-ray shows no fracture dislocation but some mild anterior subluxation of the left shoulder.  With regard to the patient's cellulitis, there is indeed clinical evidence of cellulitis, most prominent over the left leg.  She is already on Keflex which from a clinical standpoint has not provided adequate coverage.  Lab work here is reassuring aside from a slightly elevated troponin.  She does have a stress test from April that showed no inducible ischemia, lower suspicion for ACS at this time.  I discussed the patient's shoulder film with orthopedics given her increasing pain and anterior subluxation and they recommended outpatient follow-up with her surgeon for further care, happy to consult further if she is hospitalized.  I discussed the above findings with the patient and she is quite concerned about her worsening pain and erythema.  She does live alone.  Plan is for observation on the hospitalist service for IV antibiotics and monitoring.    Diagnosis:    ICD-10-CM    1. Cellulitis of lower extremity, unspecified laterality  L03.119              Scribe Disclosure:  I, Yojana Cadena, am serving as a scribe at 12:16 PM on 2023 to document services personally performed by Matt Storm MD based on my observations and the provider's statements to me.   2023   Matt Storm MD King, Colin, MD  23 [id number]

## 2023-08-31 NOTE — TELEPHONE ENCOUNTER
Pt informed of message below. Writer also left a voice message with radiology phone number per patients request.

## 2023-08-31 NOTE — TELEPHONE ENCOUNTER
Can we call Chasity Hodgson and let her know that     Recent CT of head and neck did show atherosclerosis in vicinity of knonw carotid plaque    I would recommend she continue on her cholesterol lowering agents as well as aspirin and blood pressure medications and that we repeat a carotid ultrasound in the near future - [facility] phone #[phone]

## 2023-08-31 NOTE — TELEPHONE ENCOUNTER
Patient Contact    Attempt # 1    Was call answered? No.    Left message for patient to call triage back.    Dee Roca RN

## 2023-09-01 ENCOUNTER — APPOINTMENT (OUTPATIENT)
Dept: OCCUPATIONAL THERAPY | Facility: CLINIC | Age: 78
End: 2023-09-01
Attending: PHYSICIAN ASSISTANT
Payer: MEDICARE

## 2023-09-01 VITALS
OXYGEN SATURATION: 99 % | TEMPERATURE: 97.9 F | WEIGHT: 204.9 LBS | BODY MASS INDEX: 31.05 KG/M2 | SYSTOLIC BLOOD PRESSURE: 176 MMHG | HEART RATE: 63 BPM | RESPIRATION RATE: 19 BRPM | DIASTOLIC BLOOD PRESSURE: 72 MMHG | HEIGHT: 68 IN

## 2023-09-01 LAB
ATRIAL RATE - MUSE: 51 BPM
DIASTOLIC BLOOD PRESSURE - MUSE: NORMAL MMHG
ERYTHROCYTE [DISTWIDTH] IN BLOOD BY AUTOMATED COUNT: 12.5 % (ref 10–15)
GLUCOSE BLDC GLUCOMTR-MCNC: 124 MG/DL (ref 70–99)
GLUCOSE BLDC GLUCOMTR-MCNC: 127 MG/DL (ref 70–99)
GLUCOSE BLDC GLUCOMTR-MCNC: 190 MG/DL (ref 70–99)
HCT VFR BLD AUTO: 32.5 % (ref 35–47)
HGB BLD-MCNC: 10.9 G/DL (ref 11.7–15.7)
INTERPRETATION ECG - MUSE: NORMAL
MCH RBC QN AUTO: 31.5 PG (ref 26.5–33)
MCHC RBC AUTO-ENTMCNC: 33.5 G/DL (ref 31.5–36.5)
MCV RBC AUTO: 94 FL (ref 78–100)
P AXIS - MUSE: 30 DEGREES
PLATELET # BLD AUTO: 186 10E3/UL (ref 150–450)
PR INTERVAL - MUSE: 160 MS
QRS DURATION - MUSE: 94 MS
QT - MUSE: 436 MS
QTC - MUSE: 401 MS
R AXIS - MUSE: 5 DEGREES
RBC # BLD AUTO: 3.46 10E6/UL (ref 3.8–5.2)
SYSTOLIC BLOOD PRESSURE - MUSE: NORMAL MMHG
T AXIS - MUSE: 37 DEGREES
VENTRICULAR RATE- MUSE: 51 BPM
WBC # BLD AUTO: 6.9 10E3/UL (ref 4–11)

## 2023-09-01 PROCEDURE — 99239 HOSP IP/OBS DSCHRG MGMT >30: CPT | Performed by: PHYSICIAN ASSISTANT

## 2023-09-01 PROCEDURE — 97165 OT EVAL LOW COMPLEX 30 MIN: CPT | Mod: GO

## 2023-09-01 PROCEDURE — 97535 SELF CARE MNGMENT TRAINING: CPT | Mod: GO

## 2023-09-01 PROCEDURE — 96376 TX/PRO/DX INJ SAME DRUG ADON: CPT

## 2023-09-01 PROCEDURE — G0378 HOSPITAL OBSERVATION PER HR: HCPCS

## 2023-09-01 PROCEDURE — 250N000013 HC RX MED GY IP 250 OP 250 PS 637: Performed by: PHYSICIAN ASSISTANT

## 2023-09-01 PROCEDURE — 999N000127 HC STATISTIC PERIPHERAL IV START W US GUIDANCE

## 2023-09-01 PROCEDURE — 36415 COLL VENOUS BLD VENIPUNCTURE: CPT | Performed by: PHYSICIAN ASSISTANT

## 2023-09-01 PROCEDURE — 250N000011 HC RX IP 250 OP 636: Mod: JZ | Performed by: PHYSICIAN ASSISTANT

## 2023-09-01 PROCEDURE — 85027 COMPLETE CBC AUTOMATED: CPT | Performed by: PHYSICIAN ASSISTANT

## 2023-09-01 PROCEDURE — 82962 GLUCOSE BLOOD TEST: CPT

## 2023-09-01 RX ORDER — PANTOPRAZOLE SODIUM 40 MG/1
40 TABLET, DELAYED RELEASE ORAL 2 TIMES DAILY
Status: DISCONTINUED | OUTPATIENT
Start: 2023-09-01 | End: 2023-09-01 | Stop reason: HOSPADM

## 2023-09-01 RX ORDER — LOSARTAN POTASSIUM 100 MG/1
100 TABLET ORAL DAILY
Status: DISCONTINUED | OUTPATIENT
Start: 2023-09-01 | End: 2023-09-01 | Stop reason: HOSPADM

## 2023-09-01 RX ORDER — FUROSEMIDE 20 MG
20 TABLET ORAL DAILY PRN
Status: DISCONTINUED | OUTPATIENT
Start: 2023-09-01 | End: 2023-09-01 | Stop reason: HOSPADM

## 2023-09-01 RX ORDER — LEVOTHYROXINE SODIUM 112 UG/1
112 TABLET ORAL
Status: DISCONTINUED | OUTPATIENT
Start: 2023-09-02 | End: 2023-09-01 | Stop reason: HOSPADM

## 2023-09-01 RX ORDER — FAMOTIDINE 20 MG/1
40 TABLET, FILM COATED ORAL DAILY
COMMUNITY
Start: 2023-09-01 | End: 2023-09-29

## 2023-09-01 RX ORDER — INSULIN GLARGINE 100 [IU]/ML
25 INJECTION, SOLUTION SUBCUTANEOUS AT BEDTIME
COMMUNITY
End: 2023-10-26

## 2023-09-01 RX ORDER — POLYETHYLENE GLYCOL 400 2.5 MG/ML
1 SOLUTION/ DROPS OPHTHALMIC AT BEDTIME
COMMUNITY

## 2023-09-01 RX ORDER — CEPHALEXIN 500 MG/1
500 CAPSULE ORAL 3 TIMES DAILY
Qty: 15 CAPSULE | Refills: 0 | Status: SHIPPED | OUTPATIENT
Start: 2023-09-01 | End: 2023-09-06

## 2023-09-01 RX ORDER — ACETAMINOPHEN 325 MG/1
650 TABLET ORAL EVERY 6 HOURS PRN
COMMUNITY
Start: 2023-09-01 | End: 2023-09-29

## 2023-09-01 RX ORDER — INSULIN ASPART 100 [IU]/ML
INJECTION, SOLUTION INTRAVENOUS; SUBCUTANEOUS
COMMUNITY
End: 2023-09-27

## 2023-09-01 RX ORDER — INSULIN ASPART 100 [IU]/ML
5 INJECTION, SOLUTION INTRAVENOUS; SUBCUTANEOUS 2 TIMES DAILY WITH MEALS
COMMUNITY
End: 2023-09-27

## 2023-09-01 RX ORDER — CARVEDILOL 12.5 MG/1
12.5 TABLET ORAL 2 TIMES DAILY WITH MEALS
Status: DISCONTINUED | OUTPATIENT
Start: 2023-09-01 | End: 2023-09-01 | Stop reason: HOSPADM

## 2023-09-01 RX ORDER — LIDOCAINE 4 G/G
2 PATCH TOPICAL EVERY 24 HOURS
Refills: 0 | COMMUNITY
Start: 2023-09-01 | End: 2023-12-11

## 2023-09-01 RX ADMIN — CEFAZOLIN SODIUM 2 G: 2 INJECTION, SOLUTION INTRAVENOUS at 08:38

## 2023-09-01 RX ADMIN — CEFAZOLIN SODIUM 2 G: 2 INJECTION, SOLUTION INTRAVENOUS at 00:21

## 2023-09-01 RX ADMIN — LOSARTAN POTASSIUM 100 MG: 100 TABLET, FILM COATED ORAL at 14:29

## 2023-09-01 RX ADMIN — CHLORTHALIDONE 12.5 MG: 25 TABLET ORAL at 14:27

## 2023-09-01 RX ADMIN — ACETAMINOPHEN 650 MG: 325 TABLET, FILM COATED ORAL at 08:38

## 2023-09-01 RX ADMIN — HYDROMORPHONE HYDROCHLORIDE 1 MG: 2 TABLET ORAL at 07:06

## 2023-09-01 ASSESSMENT — ACTIVITIES OF DAILY LIVING (ADL)
ADLS_ACUITY_SCORE: 38
DEPENDENT_IADLS:: TRANSPORTATION;SHOPPING
ADLS_ACUITY_SCORE: 38

## 2023-09-01 NOTE — PROGRESS NOTES
09/01/23 1116   Appointment Info   Signing Clinician's Name / Credentials (OT) Epi Lara, OTR/L   Living Environment   People in Home alone   Current Living Arrangements house  ([facility])   Home Accessibility stairs within home   Number of Stairs, Within Home, Primary three   Stair Railings, Within Home, Primary railings safe and in good condition   Transportation Anticipated family or friend will provide   Living Environment Comments Pt lives alone in [facility] with 3 stair entry and all needs met on main level. Tub shower with extended tub bench. Uses over the toilet commode for raised toilet seat and arm rests for toilet transfer.   Self-Care   Usual Activity Tolerance good   Current Activity Tolerance good   Equipment Currently Used at Home tub bench;commode chair;walker, rolling  (4WW)   Fall history within last six months no   Activity/Exercise/Self-Care Comment Patient is modified independent with all I/ADLs at baseline. Uses 4WW. Patient's daughter is available to assist w/ heavier tasks as needed. Has groceries delivered and uses [employer] for transport.   General Information   Onset of Illness/Injury or Date of Surgery 08/31/23   Referring Physician Brii Poole PA-C   Patient/Family Therapy Goal Statement (OT) To return home and be able to use L UE   Additional Occupational Profile Info/Pertinent History of Current Problem Chasity Hodgson is a 77 year old female with Pmhx of childhood polio, IDDM, CAD, CKD, DMII, multiple abdominal surgeries and colostomy, thyroid carcinoma, HTN, h/o PE & bilateral LE edema, chronic pain syndrome, who was admitted on 8/31/2023 after presenting for evaluation of pain in her left shoulder, also for concern regarding recent diagnosis cellulitis in LLE.   Existing Precautions/Restrictions weight bearing   Left Upper Extremity (Weight-bearing Status) other (see comments)  (Nothing heavier than 5#)   Cognitive Status Examination   Orientation Status  [facility] Call Center    Phone Message: Isabella  Phone: [phone]    May a detailed message be left on voicemail: yes    Reason for Call: Isabella said she has not received her insulin glargine (LANTUS SOLOSTAR) 100 UNIT/ML pen.  She is leaving for a week tomorrow and would like the insulin sent to [facility] in [place] so she can pick it up today.  Requesting a confirmation call once insulin is called in.  Thank you.     Action Taken: Message routed to:  Primary Care p [phone]   orientation to person, place and time   Follows Commands WNL   Visual Perception   Visual Impairment/Limitations corrective lenses full-time   Sensory   Sensory Quick Adds sensation intact   Pain Assessment   Patient Currently in Pain Yes, see Vital Sign flowsheet   Range of Motion Comprehensive   Comment, General Range of Motion L UE impaired 2/2 pain   Strength Comprehensive (MMT)   Comment, General Manual Muscle Testing (MMT) Assessment L UE NT, R UE WFL   Coordination   Upper Extremity Coordination No deficits were identified   Bed Mobility   Comment (Bed Mobility) Modified independent   Transfers   Transfer Comments STS, ambulation, and toilet transfers completed with modified independence.   Activities of Daily Living   BADL Assessment/Intervention lower body dressing;toileting   Lower Body Dressing Assessment/Training   Louann Level (Lower Body Dressing) modified independence   Toileting   Louann Level (Toileting) modified independence   Clinical Impression   Criteria for Skilled Therapeutic Interventions Met (OT) Yes, treatment indicated   OT Diagnosis Decreased performance with I/ADLs   OT Problem List-Impairments impacting ADL problems related to;range of motion (ROM);strength;pain   Assessment of Occupational Performance 3-5 Performance Deficits   Identified Performance Deficits Laundry, meal prep, housekeeping   Planned Therapy Interventions (OT) risk factor education;IADL retraining;home program guidelines;transfer training   Clinical Decision Making Complexity (OT) low complexity   Risk & Benefits of therapy have been explained evaluation/treatment results reviewed;care plan/treatment goals reviewed;risks/benefits reviewed;current/potential barriers reviewed;participants voiced agreement with care plan;participants included;patient   OT Total Evaluation Time   OT Eval, Low Complexity Minutes (27214) 15   OT Goals   Therapy Frequency (OT) One time eval and treatment   OT Predicted  Duration/Target Date for Goal Attainment 09/01/23   OT Goals Lower Body Dressing;Hygiene/Grooming;Toilet Transfer/Toileting;OT Goal 1   OT: Hygiene/Grooming modified independent;while standing;Goal Met   OT: Lower Body Dressing Modified independent;including set-up/clothing retrieval;Goal Met   OT: Toilet Transfer/Toileting Modified independent;toilet transfer;using adaptive equipment;Goal Met   OT: Goal 1 Patient will independently state L UE WB precautions.   Interventions   Interventions Quick Adds Self-Care/Home Management   Self-Care/Home Management   Self-Care/Home Mgmt/ADL, Compensatory, Meal Prep Minutes (54599) 10   Symptoms Noted During/After Treatment (Meal Preparation/Planning Training) none   Treatment Detail/Skilled Intervention Education and discussion initiated with patient regarding safe and independent completion of I/ADLs in home environment. Re-ed on L UE precautions and how it may impact performance and indep w/ I/ADLs. Edu on compensatory techniques to complete tasks such as meal prep, dishes, laundry, and housekeeping w/in precautions. Edu on having increased assist from daughter for heavier IADLs.   OT Discharge Planning   OT Plan Discharge   OT Discharge Recommendation (DC Rec) home with assist;home with outpatient occupational therapy   OT Rationale for DC Rec Patient is presenting below functional baseline due to L UE pain and precautions. Despite this, pt demonstrates modified independence with mobility and self-cares. Recommend home with assist as needed from family for heavier IADL tasks such as housekeeping, shopping, meal prep. Continued IADL assist for transportation. Recommend OP OT for L shouler rehab as appropriate.   OT Brief overview of current status mod I mobility and self cares in room.   Total Session Time   Timed Code Treatment Minutes 10   Total Session Time (sum of timed and untimed services) 25

## 2023-09-01 NOTE — PLAN OF CARE
"CARE FROM: 1830 - 2300  PRIMARY DIAGNOSIS: Wound Infection/Cellulitis to Bilateral Legs  OUTPATIENT/OBSERVATION GOALS TO BE MET BEFORE DISCHARGE:  ADLs back to baseline: No    Activity and level of assistance: A x1 W    Pain status: Yes    Return to near baseline physical activity: No     Discharge Planner Nurse   Safe discharge environment identified: Yes  Barriers to discharge: Yes       Entered by: Karey Dos Santos RN 08/31/2023 11:09 PM  Patient is A & O x 4, LS CTA & BS active, has a colostomy in place. Pt reported to have emptied a large amount this morning at home. Small BM in the bag now. Pain to left shoulder managed by Lidocaine patch. Non pitting edema to BLE, redness to both legs. Pt is assist x 1 with walker, (has own walker). Plan is to continue with Cefazolin q 8 hrs  /88 (BP Location: Left arm)   Pulse 64   Temp 97.7  F (36.5  C) (Oral)   Resp 18   Ht 1.727 m (5' 8\")   Wt 92.9 kg (204 lb 14.4 oz)   SpO2 100%   BMI 31.15 kg/m     Please review provider order for any additional goals.   Nurse to notify provider when observation goals have been met and patient is ready for discharge.      "

## 2023-09-01 NOTE — TELEPHONE ENCOUNTER
Attempted to call pt. Left detailed message to call clinic back at [phone].   To follow up on cellulitis concern.     Carmita Georges RN MSN

## 2023-09-01 NOTE — PLAN OF CARE
ROOM # 207    Living Situation (if not independent, order SW consult): At home alone  Facility name: NA  : Daughter, Yadira    Activity level at baseline: Independent  Activity level on admit: Assist x 1 with walker. (Has own walker)    Who will be transporting you at discharge: Yadira    Patient registered to observation; given Patient Bill of Rights; given the opportunity to ask questions about observation status and their plan of care.  Patient has been oriented to the observation room, bathroom and call light is in place.    Discussed discharge goals and expectations with patient/family. Yes

## 2023-09-01 NOTE — PLAN OF CARE
"PRIMARY DIAGNOSIS: Cellulitis of BLE  OUTPATIENT/OBSERVATION GOALS TO BE MET BEFORE DISCHARGE:  ADLs back to baseline: No    Activity and level of assistance: Up with standby assistance.    Pain status: Improved-controlled with oral pain medications.    Return to near baseline physical activity: No     Discharge Planner Nurse   Safe discharge environment identified: No  Barriers to discharge: Yes     Patient is A/Ox4, on room air, up SBA with walker, PIV dislodged this morning- new PIV being placed for IV ancef. OT consult placed. Patient reports 9/10 pain in left shoulder- PRN tylenol was given.   BP (!) 155/88 (BP Location: Right arm)   Pulse 74   Temp 97.8  F (36.6  C) (Oral)   Resp 18   Ht 1.727 m (5' 8\")   Wt 92.9 kg (204 lb 14.4 oz)   SpO2 98%   BMI 31.15 kg/m      Please review provider order for any additional goals.   Nurse to notify provider when observation goals have been met and patient is ready for discharge.                        "

## 2023-09-01 NOTE — PLAN OF CARE
PRIMARY DIAGNOSIS: BLE Cellulitis  OUTPATIENT/OBSERVATION GOALS TO BE MET BEFORE DISCHARGE:  ADLs back to baseline: Yes    Activity and level of assistance: Up with standby assistance.    Pain status: Improved-controlled with oral pain medications.    Return to near baseline physical activity: Yes     Discharge Planner Nurse   Safe discharge environment identified: Yes  Barriers to discharge: No          Patient is A/Ox4, on room air, up SBA with walker, reporting no pain currently. Patient will discharge home with outpatient PT/OT at 3pm today.  Please review provider order for any additional goals.   Nurse to notify provider when observation goals have been met and patient is ready for discharge.

## 2023-09-01 NOTE — PLAN OF CARE
PRIMARY DIAGNOSIS: Wound Infection/Cellulitis to Bilateral Legs   OUTPATIENT/OBSERVATION GOALS TO BE MET BEFORE DISCHARGE:  ADLs back to baseline: No    Activity and level of assistance: A x1 with walker and GB     Pain status: Improved-controlled with oral pain medications.    Return to near baseline physical activity: No     Discharge Planner Nurse   Safe discharge environment identified: Yes  Barriers to discharge: Yes       Entered by: Gen Monroe RN 09/01/2023 2:56 AM  Pt is A&O x4, Pt received her midnight dose of ancef. Pt says that she is having slight pain in her shoulder and a little burning in her legs. Pt is resting in bed and able to make needs known. Will continue to follow plan of care.   Please review provider order for any additional goals.   Nurse to notify provider when observation goals have been met and patient is ready for discharge.

## 2023-09-01 NOTE — PHARMACY-ADMISSION MEDICATION HISTORY
Pharmacist Admission Medication History    Admission medication history is complete. The information provided in this note is only as accurate as the sources available at the time of the update.    Medication reconciliation/reorder completed by provider prior to medication history? No    Information Source(s): Patient via in-person    Pertinent Information:      Changes made to PTA medication list:  Added: blink gel  Deleted: ferrous sulfate, albuterol  Changed: Lantus/novolog, blink eye drops, sucralfate, tylenol, azelastine nasal spray, lotrimin cream, pepcid    Medication Affordability:  Not including over the counter (OTC) medications, was there a time in the past 3 months when you did not take your medications as prescribed because of cost?: No    Allergies reviewed with patient and updates made in EHR: yes    Medication History Completed By: Malu Osorio RPH 9/1/2023 11:57 AM    Prior to Admission medications    Medication Sig Last Dose Taking? Auth Provider Long Term End Date   acetaminophen (TYLENOL) 500 MG tablet Take 1,000 mg by mouth every 8 hours as needed Past Week at am Yes Reported, Patient     acetaminophen-caffeine (EXCEDRIN TENSION HEADACHE) 500-65 MG TABS Take 2 tablets by mouth every 6 hours as needed for mild pain More than a month Yes Tasha Hay MD     amoxicillin (AMOXIL) 500 MG capsule Takes 4 caps before every dental appointments.  Yes Reported, Patient     aspirin (ASA) 81 MG EC tablet Take 81 mg by mouth daily 8/30/2023 at am Yes Reported, Patient     azelastine (ASTEPRO) 0.15 % nasal spray Spray 1 spray into both nostrils daily as needed Past Week Yes Reported, Patient     carvedilol (COREG) 12.5 MG tablet Take 1 tablet (12.5 mg) by mouth 2 times daily (with meals) 8/31/2023 at am Yes Amber Chan APRN CNP Yes    cephALEXin (KEFLEX) 500 MG capsule Take 1 capsule (500 mg) by mouth 3 times daily  Patient taking differently: Take 500 mg by mouth 3 times daily Started on  8/26  Lot: 10 days 8/31/2023 at am Yes Maude Graham PA-C     chlorthalidone (HYGROTON) 25 MG tablet Take 0.5 tablets (12.5 mg) by mouth every other day 8/29/2023 at am Yes Amber Chan APRN CNP Yes    clotrimazole (LOTRIMIN) 1 % cream Apply topically as needed  Yes Reported, Patient     empagliflozin (JARDIANCE) 10 MG TABS tablet Take 1 tablet (10 mg) by mouth daily 8/30/2023 at am Yes Odette Weston MD     ezetimibe (ZETIA) 10 MG tablet Take 1 tablet (10 mg) by mouth daily 8/30/2023 at am Yes Shola Benoit MD Yes    famotidine (PEPCID) 20 MG tablet Take 2 tablets (40 mg) by mouth daily 8/30/2023 at pm Yes Shola Benoit MD     fenofibrate (TRICOR) 145 MG tablet Take 1 tablet (145 mg) by mouth daily 8/30/2023 at am Yes Shola Benoit MD Yes    fexofenadine (ALLEGRA) 180 MG tablet Take 180 mg by mouth daily. 8/30/2023 at am Yes Ramirez Chahal DPM     fluocinolone acetonide (DERMA SMOOTHE/FS BODY) 0.01 % external oil Apply 2 mL to scalp once per week. Massage into scalp. Can be left in overnight or washed out after 4-6 hours. Past Week Yes Renetta Meyer MD     fluticasone (FLONASE) 50 MCG/ACT spray Spray 2 sprays in nostril daily 2 sprays in each nostril qd 8/30/2023 at am Yes Abhishek Patino PA-C     furosemide (LASIX) 20 MG tablet Take 1 tablet (20 mg) by mouth daily as needed (lower extremity edema)  Yes Shola Benoit MD Yes    Icosapent Ethyl 1 g CAPS Take 1 g by mouth 2 times daily  Yes Odette Weston MD Yes    insulin aspart (NOVOLOG FLEXPEN) 100 UNIT/ML pen   Yes Unknown, Entered By History No    insulin aspart (NOVOLOG FLEXPEN) 100 UNIT/ML pen Inject 5 Units Subcutaneous 2 times daily (with meals) Breakfast and supper, pt eats minimal at lunch  Yes Unknown, Entered By History No    insulin glargine (LANTUS VIAL) 100 UNIT/ML vial Inject 25 Units Subcutaneous At Bedtime If BG<150-->takes 12 units (50% of dose) 8/30/2023 at  hs Yes Unknown, Entered By History No    MULTIPLE VITAMIN PO Take 1 tablet by mouth 8/30/2023 at am Yes Reported, Patient     nitroGLYcerin (NITROSTAT) 0.4 MG sublingual tablet Place 1 tablet (0.4 mg) under the tongue every 5 minutes as needed for chest pain (no more than 3 in one hour; after 3rd, call 911.)  Yes Ankit Bhatia MD Yes    nystatin (MYCOSTATIN) cream Apply topically daily as needed   Yes Reported, Patient     nystatin-triamcinolone (MYCOLOG II) cream Apply topically daily as needed   Yes Reported, Patient     olmesartan (BENICAR) 40 MG tablet Take 1 tablet (40 mg) by mouth daily 8/31/2023 at am Yes Amber Chan APRN CNP Yes    ondansetron (ZOFRAN) 4 MG tablet Take 1 tablet (4 mg) by mouth every 6 hours as needed Reported on 3/20/2017  Yes Jemal Ortiz, Maude Andrade PA-C     pantoprazole (PROTONIX) 40 MG EC tablet Take 40 mg by mouth 2 times daily 8/30/2023 at pm Yes Reported, Patient     Polyethylene Glycol 400 (BLINK TEARS) 0.25 % GEL Apply to eye At Bedtime 8/30/2023 at hs Yes Unknown, Entered By History     polyethylene glycol 400 (BLINK TEARS) 0.25 % SOLN ophthalmic solution Place 1 drop into both eyes daily 8/31/2023 at am Yes Reported, Patient     sucralfate (CARAFATE) 1 GM/10ML suspension Take 1 g by mouth 4 times daily as needed Past Week Yes Reported, Patient     SYNTHROID 112 MCG tablet TAKE ONE-HALF TABLET BY MOUTH DAILY ON FRIDAYS AND TAKE ONE TABLET BY MOUTH ALL OTHER DAYS AS DIRECTED 8/31/2023 at am Yes Shola Benoit MD Yes    tretinoin (RETIN-A) 0.025 % external cream Use every night as tolerated - spot treat lesion  Yes Renetta Meyer MD     triamcinolone (KENALOG) 0.1 % external ointment Apply topically every other day  Yes Renetta Meyer MD     Vitamin D3 50 mcg (2000 units) tablet 1 tablet daily 8/30/2023 at am Yes Reported, Patient     Continuous Blood Gluc Sensor (FREESTYLE BRANDY 3 SENSOR) MISC 1 each every 14 days    Odette Weston MD     insulin pen needle (B-D U/F) 31G X 5 MM miscellaneous Use 1 pen needles daily or as directed.   Odette Weston MD     order for DME Equipment being ordered: Compression stockings - Knee High; 20-30 mmHg compression - note would like adhesive band to keep the stocking from sliding down   Shola Benoit MD

## 2023-09-01 NOTE — PLAN OF CARE
Occupational Therapy Discharge Summary    Reason for therapy discharge:    All goals and outcomes met, no further needs identified.    Progress towards therapy goal(s). See goals on Care Plan in [facility] electronic health record for goal details.  Goals met    Therapy recommendation(s):    Continued therapy is recommended.  Rationale/Recommendations:  Outpatient therapy as appropriate for L shoulder strength/ROM.

## 2023-09-01 NOTE — DISCHARGE SUMMARY
[facility]    Discharge Summary  Hospitalist    Date of Admission:  8/31/2023  Date of Discharge:  9/1/2023  Provider:  Stephanie Poole PA-C  Date of Service (when I last saw the patient): 09/01/23    Discharge Diagnoses   Nonpurulent cellulitis of LLE  Left shoulder pain   Elevated troponin level     Other medical issues:  Past Medical History:   Diagnosis Date    Abdominal adhesions 1984, 96,99    s/p lysis    Abdominal adhesions     Acne rosacea     Allergic rhinitis     Allergic rhinitis, cause unspecified     Alopecia     Anemia     CAD (coronary artery disease)     Carotid stenosis     CKD (chronic kidney disease) stage 2, GFR 60-89 ml/min     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)     CPAP (continuous positive airway pressure) dependence     Depression     Diabetic gastroparesis (H)     Diet-controlled type 2 diabetes mellitus (H)     DM2 (diabetes mellitus, type 2) (H)     DVT (deep venous thrombosis) (H)     DVT of axillary vein, acute right (H)     Fibromyalgia     Gastro-oesophageal reflux disease     GERD (gastroesophageal reflux disease)     Hernia of unspecified site of abdominal cavity without mention of obstruction or gangrene     bilateral    Hernia, abdominal     History of blood transfusion     10/ 1980    History of thrombophlebitis     HTN (hypertension)     HTN (hypertension)     Hypertriglyceridemia     Hypertriglyceridemia     Hypokalemia     Hypothyroidism     Mumps     Obstructive sleep apnea     ANNETTE (obstructive sleep apnea)     ANNETTE on CPAP     Osteoarthritis of glenohumeral joint     Papillary carcinoma of thyroid (H)     s/p thyroidectomy - Ruegemer    Papillary carcinoma of thyroid (H)     PE (pulmonary embolism)     Poliomyelitis     poor circulation right leg    Poliomyelitis     Postsurgical hypothyroidism     s/p papillary thryoid cancer - Ruegemer    Pulmonary embolism (H)     Pulmonary embolus (H)     Pulmonary nodule     Rosacea     S/P carpal tunnel release     bilateral     "S/P hardware removal 01/2014    still with lingering foot pain    S/P shoulder surgery     bilateral    Septic joint (H)     right knee    Septic joint of right knee joint (H)     Venous insufficiency     Venous insufficiency     Venous thrombosis 1999    right axillary vein     History of Present Illness   Chasity Hodgson is a 77 year old female who presented to the ED for \"bilateral cellulitis and left shoulder pain\".     She reports history of left shoulder surgery in 2020 with Adam Gan. Since shoulder surgery she has had intermittent pain across her left bicep, shoulder with activity and usually lifts no more than 5 lbs.      On 8/29 she woke up with more stiffness and pain in her left shoulder than usual.  She had no pain into her neck or chest pain.  No shortness of breath.  She denied any new injury to her left shoulder.  She is right-hand dominant uses a walker in [place].  No meds tried for pain.     She presented to the ED today given concern about her recent diagnosis of cellulitis in her leg and due to the pain in her left shoulder. She started Keflex PM of 8/29 she previously had a small scratch on her left anterior shin that had healed.  Her left anterior shin was warm and red so she presented to the emergency department. She is able to stand and bear weight in her leg.  There has been no drainage from the skin or fever.     In the ED given tylenol, 2 gram Ancef.      Admission was requested from hospitalist service for \"cellulitis, elevated troponin\". ED provider and patient felt uncomfortable discharging the patient as she lives alone, there was concern about pain control for her left shoulder.  Please see the admission history and physical for full details.    Hospital Course   Chasity Hodgson was admitted on 8/31/2023.  The following problems were addressed during her hospitalization:    Non purulent cellulitis of LLE   Presented with painful erythema, edema of LLE. Symptoms started 8/25, " she had a small excoriation near ankle that may have started this now healed. Prescribed Keflex (8/27) diagnosed with cellulitis at pre op visit. Hx of lymphedema and cellulitis in lower extremities.   Clinically did not fail outpatient abx. The area appears mildly erythematous and is tender.  She is not febrile or septic.  Does not appear to be clinically worsening however has not resolved as she had expected.  Exam is consistent with mild erysipelas or strep etiology.  She has hx of MRSA in left finger related to prior surgery back in 2022 had hardware removed, treated with Clinda.  Has episodes of cellulitis have resolved with treatment of Keflex.  -continue to elevate left LE with ongoing swelling   -no need for additional Lasix, continue PTA 20 mg daily   -outlined erythema within borders and receeding at discharge   -received IV ancef while admitted patient to oral Keflex at discharge for an additional 10-day course, follow-up with primary care to check in for improvement prior to having urologic surgery in September.  -Recommend outpatient follow-up for consideration of lymphedema treatments, compression stockings      Left Shoulder pain  Prior Hemiarthroplasty 2020, 2 prior arthroscopies and biceps resection  Main reason for presentation, worsening pain in her left shoulder over the past 48 hours.  History of chronic shoulder issues at least since 2018 in both shoulders.  Follows with Dr Maisha Medina.   Imaging shows Anterior subluxation of the left glenohumeral joint.   Ortho curb sided from ED outpatient ED who agreed to see the patient in consult if pain uncontrolled or concerns while admitted.   -activity, weight bear as tolerated, lift no greater than 5 pounds in left upper extremity  -Analgesia: Topical anesthesia and ice, Tylenol, avoid NSAIDs.  Would not recommend steroids for this nature of pain.  Low-dose Dilaudid for breakthrough pain  -Lives alone in [facility] thus requested PT/ OT/SW evaluations  "for disposition  -Pain improving with tylenol and lidocaine patches but still with limited ROM  -OT cleared for discharge with outpatient PT/OT  -outpatient close follow-up with her orthopedic team at [facility] (already has appointment scheduled for 9/8)     Chronic Pain Syndrome  Hx of Right foot drop from polio  Hx of chronic \"widespread\" pain, history of DJD neurogenic claudication follows with a spine team at [facility] spine and sees orthopedic provider Dr. Christy at [facility], Abraham Santana. Also follows with pain team. Started Lyrica previously that caused edema, usually treated with topical agents.   -Management, therapy consults as above     CKD   HTN  Creatinine is near baseline around 1.2-1.4.  Follows with nephrology for history of suggestive renal artery stenosis.  -Continue PTA to prior to admission blood pressure medications  -No NSAIDs for pain control     Non Obstructive CAD   No chest pain, angina equivalents, dyspnea.  Troponin was checked in triage due to left shoulder pain and flat very minimally detected.  No acute ischemic changes on EKG.  She has absolutely no cardiac symptoms.  She had a coronary angiogram in 2018 that showed minimal coronary artery disease and a reassuring nuc med stress test in April of this year.  -continue medical management      Elevated troponin, suspect demand ischemia   - no associated chest pain or further work up      Type II DM  Last A1c 8.7 on insulin   -Continue prior to admission insulin regimen, no adjustments made this stay      ANNETTE  -CPAP resume if able to supply as observation.  Otherwise supplement oxygen as needed    Pending Results   None    Code Status   Full Code       Primary Care Physician   Shola Benoit MD    Exam:    BP (!) 176/72 (BP Location: Right arm)   Pulse 63   Temp 97.9  F (36.6  C) (Oral)   Resp 19   Ht 1.727 m (5' 8\")   Wt 92.9 kg (204 lb 14.4 oz)   SpO2 99%   BMI 31.15 kg/m    General: A&Ox3, sitting up in chair, NAD  HEENT: " NC/AT, MMM  Lungs: CTAB without wheezing  CV: RRR without murmur  GI: soft, colostomy in place, nondistended, nontender, normoactive bowel sounds  Skin: mild erythema in left LE, mild bilateral LE edema present from foot to shin   MSK: tenderness over left proximal shoulder near bicep and deltoid, limited ROM with abduction and adduction     Discharge Disposition   Discharged to home    Consultations This Hospital Stay   CARE MANAGEMENT / SOCIAL WORK IP CONSULT  OCCUPATIONAL THERAPY ADULT IP CONSULT    Time Spent on this Encounter   I, Brii Poole PA-C, personally saw the patient today and spent greater than 30 minutes discharging this patient.    Discharge Orders      Primary Care - Care Coordination Referral      Occupational Therapy Referral      Physical Therapy Referral      Reason for your hospital stay    You were admitted due to concerns for left shoulder pain and nonresolving left lower extremity cellulitis.  Work-up included basic labs and imaging.  You were treated with IV antibiotics with improvement in your cellulitis and will transition back to oral Keflex for total of 10 days treatment of your cellulitis. Continue your prior to admission lasix dosing and elevate your leg for swelling. Recommend consideration for lymphedema treatments and/or compression stockings as an outpatient.  In regards to your left shoulder pain you had an x-ray which showed anterior subluxation of the left glenohumeral (shoulder) joint otherwise no evidence for fracture.  Orthopedic surgery was contacted in the ER and felt that if your pain was improving there was no need for evaluation this stay but rather for you to follow-up with your primary orthopedic provider which you already have a scheduled appointment with on 9/8.  Continue pain control with Tylenol and lidocaine patches.  You were evaluated by Occupational Therapy and felt you are safe to discharge back home with ongoing outpatient therapy.     Follow-up and  recommended labs and tests     Follow up with primary care provider, Shola Benoit MD, within 10-14 days for hospital follow-up and reassess resolution of cellulitis.  No follow up labs or test are needed. Discuss with primary care about lymphedema therapy or compression stockings as outpatient.   Follow up with primary orthopedic provider Dr. Christy as planned on 9/8 to assess left should pain and subluxation noted on x-ray during hospital stay.     Activity    Your activity upon discharge: activity as tolerated; left upper extremity - weight bear as tolerated, lift no greater than 5 pounds     Diet    Follow this diet upon discharge: Orders Placed This Encounter      Regular Diet Adult     Discharge Medications   Discharge Medication List as of 9/1/2023  3:15 PM        START taking these medications    Details   !! acetaminophen (TYLENOL) 325 MG tablet Take 2 tablets (650 mg) by mouth every 6 hours as needed for mild pain or other (and adjunct with moderate or severe pain or per patient request), OTC      Lidocaine (LIDOCARE) 4 % Patch Place 2 patches onto the skin every 24 hours To prevent lidocaine toxicity, patient should be patch free for 12 hrs daily.[id number]       !! - Potential duplicate medications found. Please discuss with provider.        CONTINUE these medications which have CHANGED    Details   cephALEXin (KEFLEX) 500 MG capsule Take 1 capsule (500 mg) by mouth 3 times daily for 5 days, Disp-15 capsule, R-0, Local Print      famotidine (PEPCID) 20 MG tablet Take 2 tablets (40 mg) by mouth daily, Historical           CONTINUE these medications which have NOT CHANGED    Details   !! acetaminophen (TYLENOL) 500 MG tablet Take 1,000 mg by mouth every 8 hours as needed, Historical      acetaminophen-caffeine (EXCEDRIN TENSION HEADACHE) 500-65 MG TABS Take 2 tablets by mouth every 6 hours as needed for mild pain, Disp-90 tablet, R-0, E-Prescribe      amoxicillin (AMOXIL) 500 MG capsule Takes 4 caps  before every dental appointments., Historical      aspirin (ASA) 81 MG EC tablet Take 81 mg by mouth daily, Historical      azelastine (ASTEPRO) 0.15 % nasal spray Spray 1 spray into both nostrils daily as needed, Historical      carvedilol (COREG) 12.5 MG tablet Take 1 tablet (12.5 mg) by mouth 2 times daily (with meals), Disp-180 tablet, R-3, E-Prescribe      chlorthalidone (HYGROTON) 25 MG tablet Take 0.5 tablets (12.5 mg) by mouth every other day, Disp-45 tablet, R-3, No Print Out      clotrimazole (LOTRIMIN) 1 % cream Apply topically as neededHistorical      Continuous Blood Gluc Sensor (FREESTYLE BRANDY 3 SENSOR) MISC 1 each every 14 days, Disp-6 each, R-3, E-Prescribe      empagliflozin (JARDIANCE) 10 MG TABS tablet Take 1 tablet (10 mg) by mouth daily, Disp-90 tablet, R-1, E-Prescribe      ezetimibe (ZETIA) 10 MG tablet Take 1 tablet (10 mg) by mouth daily, Disp-90 tablet, R-3, E-Prescribe      fenofibrate (TRICOR) 145 MG tablet Take 1 tablet (145 mg) by mouth daily, Disp-90 tablet, R-3, E-Prescribe      fexofenadine (ALLEGRA) 180 MG tablet Take 180 mg by mouth daily., Disp-120, R-0, Historical      fluocinolone acetonide (DERMA SMOOTHE/FS BODY) 0.01 % external oil Apply 2 mL to scalp once per week. Massage into scalp. Can be left in overnight or washed out after 4-6 hours.Disp-118.28 mL, [id number]      fluticasone (FLONASE) 50 MCG/ACT spray Spray 2 sprays in nostril daily 2 sprays in each nostril qd, Disp-1 Bottle, R-0, Local Print      furosemide (LASIX) 20 MG tablet Take 1 tablet (20 mg) by mouth daily as needed (lower extremity edema), Disp-90 tablet, R-3, E-PrescribeFor Profile Only - patient will call to fill      Icosapent Ethyl 1 g CAPS Take 1 g by mouth 2 times daily, Disp-60 capsule, R-11, E-Prescribe      insulin pen needle (B-D U/F) 31G X 5 MM miscellaneous Use 1 pen needles daily or as directed.Disp-90 each, [id number]      MULTIPLE VITAMIN PO Take 1 tablet by mouth, Historical       nitroGLYcerin (NITROSTAT) 0.4 MG sublingual tablet Place 1 tablet (0.4 mg) under the tongue every 5 minutes as needed for chest pain (no more than 3 in one hour; after 3rd, call 911.), Disp-25 tablet, R-3, E-Prescribe      nystatin (MYCOSTATIN) cream Apply topically daily as needed Historical      nystatin-triamcinolone (MYCOLOG II) cream Apply topically daily as needed Historical      olmesartan (BENICAR) 40 MG tablet Take 1 tablet (40 mg) by mouth daily, Disp-30 tablet, R-3, E-Prescribe      ondansetron (ZOFRAN) 4 MG tablet Take 1 tablet (4 mg) by mouth every 6 hours as needed Reported on 3/20/2017, Disp-20 tablet, R-0, E-Prescribe      order for DME Equipment being ordered: Compression stockings - Knee High; 20-30 mmHg compression - note would like adhesive band to keep the stocking from sliding downDisp-3 each,R-0, Local Print      pantoprazole (PROTONIX) 40 MG EC tablet Take 40 mg by mouth 2 times daily, Historical      polyethylene glycol 400 (BLINK TEARS) 0.25 % SOLN ophthalmic solution Place 1 drop into both eyes daily, Historical      sucralfate (CARAFATE) 1 GM/10ML suspension Take 1 g by mouth 4 times daily as needed, Historical      SYNTHROID 112 MCG tablet TAKE ONE-HALF TABLET BY MOUTH DAILY ON FRIDAYS AND TAKE ONE TABLET BY MOUTH ALL OTHER DAYS AS DIRECTED, Disp-90 tablet, R-3, ALEXIA, E-PrescribeFor Profile Only - patient will call to fill      tretinoin (RETIN-A) 0.025 % external cream Use every night as tolerated - spot treat lesionDisp-20 g, R-3Local Print      triamcinolone (KENALOG) 0.1 % external ointment Apply topically every other dayDisp-80 g, [id number]      Vitamin D3 50 mcg (2000 units) tablet 1 tablet daily, Historical      !! insulin aspart (NOVOLOG FLEXPEN) 100 UNIT/ML pen Historical      !! insulin aspart (NOVOLOG FLEXPEN) 100 UNIT/ML pen Inject 5 Units Subcutaneous 2 times daily (with meals) Breakfast and supper, pt eats minimal at lunch, Historical      insulin glargine (LANTUS  VIAL) 100 UNIT/ML vial Inject 25 Units Subcutaneous At Bedtime If BG<150-->takes 12 units (50% of dose), Historical      Polyethylene Glycol 400 (BLINK TEARS) 0.25 % GEL Apply to eye At Bedtime, Historical       !! - Potential duplicate medications found. Please discuss with provider.        Allergies   Allergies   Allergen Reactions    Ketorolac Tromethamine Difficulty breathing     Shortness of breath    Nsaids Difficulty breathing     Increased creatinine    Celecoxib Itching and Rash    Morphine And Related Itching     With higher doses    Crestor [Rosuvastatin] Muscle Pain (Myalgia)    No Clinical Screening - See Comments Itching     Fragrance    Vioxx Other (See Comments)     Heart races    Conjugated Estrogens Rash    Sulfa Antibiotics Rash     Data   Results for orders placed or performed during the hospital encounter of 08/31/23   XR Shoulder Left G/E 3 Views     Status: None    Narrative    EXAM: SHOULDER LEFT THREE OR MORE VIEWS  DATE/TIME: 8/31/2023 1:01 PM     INDICATION: Left shoulder pain.   COMPARISON: None.      Impression    IMPRESSION:  1.  Probable left glenohumeral hemiarthroplasty with proximal humeral  endoprosthesis. The component is well seated without evidence of  loosening.  2.  Anterior subluxation of the left glenohumeral joint.  3.  No fracture.        GURU WARD MD         SYSTEM ID:  [id number]   Magnesium     Status: Normal   Result Value Ref Range    Magnesium 2.2 1.7 - 2.3 mg/dL   Basic metabolic panel     Status: Abnormal   Result Value Ref Range    Sodium 140 136 - 145 mmol/L    Potassium 4.6 3.4 - 5.3 mmol/L    Chloride 105 98 - 107 mmol/L    Carbon Dioxide (CO2) 25 22 - 29 mmol/L    Anion Gap 10 7 - 15 mmol/L    Urea Nitrogen 28.0 (H) 8.0 - 23.0 mg/dL    Creatinine 1.37 (H) 0.51 - 0.95 mg/dL    Calcium 9.2 8.8 - 10.2 mg/dL    Glucose 194 (H) 70 - 99 mg/dL    GFR Estimate 40 (L) >60 mL/min/1.73m2   Troponin T, High Sensitivity     Status: Abnormal   Result Value Ref Range     Troponin T, High Sensitivity 26 (H) <=14 ng/L   CBC with platelets and differential     Status: Abnormal   Result Value Ref Range    WBC Count 6.9 4.0 - 11.0 10e3/uL    RBC Count 3.30 (L) 3.80 - 5.20 10e6/uL    Hemoglobin 10.3 (L) 11.7 - 15.7 g/dL    Hematocrit 30.7 (L) 35.0 - 47.0 %    MCV 93 78 - 100 fL    MCH 31.2 26.5 - 33.0 pg    MCHC 33.6 31.5 - 36.5 g/dL    RDW 12.6 10.0 - 15.0 %    Platelet Count 182 150 - 450 10e3/uL    % Neutrophils 62 %    % Lymphocytes 26 %    % Monocytes 9 %    % Eosinophils 3 %    % Basophils 0 %    % Immature Granulocytes 0 %    NRBCs per 100 WBC 0 <1 /100    Absolute Neutrophils 4.2 1.6 - 8.3 10e3/uL    Absolute Lymphocytes 1.8 0.8 - 5.3 10e3/uL    Absolute Monocytes 0.6 0.0 - 1.3 10e3/uL    Absolute Eosinophils 0.2 0.0 - 0.7 10e3/uL    Absolute Basophils 0.0 0.0 - 0.2 10e3/uL    Absolute Immature Granulocytes 0.0 <=0.4 10e3/uL    Absolute NRBCs 0.0 10e3/uL   Troponin T, High Sensitivity     Status: Abnormal   Result Value Ref Range    Troponin T, High Sensitivity 25 (H) <=14 ng/L   Glucose by meter     Status: Abnormal   Result Value Ref Range    GLUCOSE BY METER POCT 126 (H) 70 - 99 mg/dL   Glucose by meter     Status: Abnormal   Result Value Ref Range    GLUCOSE BY METER POCT 125 (H) 70 - 99 mg/dL   CBC with platelets     Status: Abnormal   Result Value Ref Range    WBC Count 6.9 4.0 - 11.0 10e3/uL    RBC Count 3.46 (L) 3.80 - 5.20 10e6/uL    Hemoglobin 10.9 (L) 11.7 - 15.7 g/dL    Hematocrit 32.5 (L) 35.0 - 47.0 %    MCV 94 78 - 100 fL    MCH 31.5 26.5 - 33.0 pg    MCHC 33.5 31.5 - 36.5 g/dL    RDW 12.5 10.0 - 15.0 %    Platelet Count 186 150 - 450 10e3/uL   Glucose by meter     Status: Abnormal   Result Value Ref Range    GLUCOSE BY METER POCT 124 (H) 70 - 99 mg/dL   Glucose by meter     Status: Abnormal   Result Value Ref Range    GLUCOSE BY METER POCT 127 (H) 70 - 99 mg/dL   Glucose by meter     Status: Abnormal   Result Value Ref Range    GLUCOSE BY METER POCT 190 (H) 70 -  99 mg/dL   EKG 12-lead, tracing only     Status: None   Result Value Ref Range    Systolic Blood Pressure  mmHg    Diastolic Blood Pressure  mmHg    Ventricular Rate 51 BPM    Atrial Rate 51 BPM    MN Interval 160 ms    QRS Duration 94 ms     ms    QTc 401 ms    P Axis 30 degrees    R AXIS 5 degrees    T Axis 37 degrees    Interpretation ECG       Sinus bradycardia  Possible Left atrial enlargement  Borderline ECG  When compared with ECG of 14-APR-2023 10:36,  Vent. rate has decreased BY  29 BPM  Criteria for Anterior infarct are no longer Present  Confirmed by - EMERGENCY ROOM, PHYSICIAN ([id number]),  JESSICA JONES ([id number]) on 9/1/2023 6:44:55 AM     CBC with platelets differential     Status: Abnormal    Narrative    The following orders were created for panel order CBC with platelets differential.  Procedure                               Abnormality         Status                     ---------                               -----------         ------                     CBC with platelets and d...[[id number]]  Abnormal            Final result                 Please view results for these tests on the individual orders.     Brii Poole PA-C

## 2023-09-01 NOTE — PLAN OF CARE
"OBSERVATION patient END time: 3:39 pm    Patient's After Visit Summary was reviewed with patient and/or family.   Patient verbalized understanding of After Visit Summary, recommended follow up and was given an opportunity to ask questions.   Discharge medications sent home with patient/family: Prescription sent with patient   Discharged with daughter    BP (!) 176/72 (BP Location: Right arm)   Pulse 63   Temp 97.9  F (36.6  C) (Oral)   Resp 19   Ht 1.727 m (5' 8\")   Wt 92.9 kg (204 lb 14.4 oz)   SpO2 99%   BMI 31.15 kg/m                                  "

## 2023-09-01 NOTE — CONSULTS
Care Management Discharge Note    Discharge Date: 09/01/2023       Discharge Disposition: Home, Outpatient Rehab (PT, OT, SLP, Cardiac or Pulmonary)    Discharge Transportation: family or friend will provide    Private pay costs discussed: Not applicable    Does the patient's insurance plan have a 3 day qualifying hospital stay waiver?  Yes   Will the waiver be used for post-acute placement? No    Education Provided on the Discharge Plan:  Yes  Persons Notified of Discharge Plans: Pt, provider  Patient/Family in Agreement with the Plan: yes    Handoff Referral Completed: Yes    Additional Information:  CM consulted for discharge planning. Pt admitted with cellulitis. Met with pt at bedside. Pt lives alone in a [facility], all needs are on one level. Spouse lives in LTC at [facility]. Pt has good support from family. Family will be able to provide transportation home. Therapy is recommending OP therapy and pt agrees. Offered home care, however pt prefers not to be homebound in order to receive home care. Pt denies having any other discharge needs.  Please call if needs arise.    Sharon Lewis RN   Inpatient Care Coordination  [facility]   Phone: [phone]

## 2023-09-01 NOTE — PLAN OF CARE
PRIMARY DIAGNOSIS: Wound Infection/Cellulitis to Bilateral Legs   OUTPATIENT/OBSERVATION GOALS TO BE MET BEFORE DISCHARGE:  ADLs back to baseline: No     Activity and level of assistance: A x1 with walker and GB      Pain status: Improved-controlled with oral pain medications.     Return to near baseline physical activity: No          Discharge Planner Nurse   Safe discharge environment identified: Yes  Barriers to discharge: Yes       Entered by: Gen Monroe RN 09/01/2023 5:23 AM    Pt is A&O x4, Pt received her midnight dose of ancef. Pt says that she is having slight pain in her shoulder and a little burning in her legs. Pt will have consult with PT, OT, and SW today. Pt is resting in bed and able to make needs known. Will continue to follow plan of care.     Please review provider order for any additional goals.   Nurse to notify provider when observation goals have been met and patient is ready for discharge.

## 2023-09-05 NOTE — CONFIDENTIAL NOTE
Spoke to pt. She reports her left lower leg has improved since last week. Swelling and redness has improved and is now per usual. She continues on antibiotics and anticipates will be done on Sunday. No fever or pain.     Carmita Georges RN MSN

## 2023-09-06 ENCOUNTER — VIRTUAL VISIT (OUTPATIENT)
Dept: FAMILY MEDICINE | Facility: CLINIC | Age: 78
End: 2023-09-06
Payer: MEDICARE

## 2023-09-06 DIAGNOSIS — I89.0 LYMPHEDEMA: ICD-10-CM

## 2023-09-06 DIAGNOSIS — Z09 HOSPITAL DISCHARGE FOLLOW-UP: Primary | ICD-10-CM

## 2023-09-06 DIAGNOSIS — E11.9 TYPE 2 DIABETES, HBA1C GOAL < 7% (H): ICD-10-CM

## 2023-09-06 DIAGNOSIS — L03.116 CELLULITIS OF LEFT LEG: ICD-10-CM

## 2023-09-06 PROCEDURE — 99213 OFFICE O/P EST LOW 20 MIN: CPT | Mod: VID | Performed by: PHYSICIAN ASSISTANT

## 2023-09-06 NOTE — CONFIDENTIAL NOTE
Mishel Zendejas MD Bratsch, Angie J RN  Caller: Unspecified (1 week ago)  Please make sure she knows that her case was rescheduled to Monday the 18th    Spoke to pt. Pt is aware her procedure is for 9/18. She asks for the scheduled time of the procedure. Informed her of the time posted, but that time could be changed later. Pt verbalized understanding, but expressed concern that the time is not set and that she has to figure out transportation. Pt states that [facility] would be bringing her and they do not do last minute scheduling. She also states her daughter would be picking her up after work which is around 330 pm. Will send message to nurse Purvi and surgery scheduler.

## 2023-09-06 NOTE — PROGRESS NOTES
Sammie is a 77 year old who is being evaluated via a billable video visit.      How would you like to obtain your AVS? MyChart  If the video visit is dropped, the invitation should be resent by: Text to cell phone: [phone]  Will anyone else be joining your video visit? No      Assessment and Plan:     (Z09) Hospital discharge follow-up  (primary encounter diagnosis)  Comment: admitted to observation unit for LLE cellulitis, given ancef and keflex Rx extended, doing well overall  Plan: cont keflex, elevate as much as possible    (I89.0) Lymphedema  Comment:bilat lower ex  Plan: Lymphedema Therapy Referral    (L03.116) Cellulitis of left leg  Comment: much improved, has recurrent cellulitis  Plan: finish keflex    (E11.9) Type 2 diabetes, HbA1c goal < 7% (H)  Comment: on insulin  Plan: sees endo the end of this month, she would benefit from better DM control      Maude Ortiz PA-C      Subjective   Sammie is a 77 year old, presenting for the following health issues:  Follow Up    History of Present Illness       Reason for visit:  Cellulitis in left leg and treatment    She eats 2-3 servings of fruits and vegetables daily.She consumes 0 sweetened beverage(s) daily.She exercises with enough effort to increase her heart rate 10 to 19 minutes per day.  She exercises with enough effort to increase her heart rate 3 or less days per week.   She is taking medications regularly.     Sammie is seeing me for follow-up.  I saw her last week for preop and noted that she had some left lower extremity cellulitis.  I started her on Keflex.  Unfortunately, her symptoms were not improving so presented to the ED.      She was admitted to the observation unit where she was given IV Ancef overnight.  She was discharged on a 10-day course of Keflex.  It was recommended that she follow-up with lymphedema therapy.      She also had some left shoulder pain while in the hospital.  She will follow-up with her orthopedic PA later this  week.      She notes that since she was discharged from the hospital she is been doing quite well.  Swelling of the legs has decreased significantly.  The redness and tenderness has resolved.    She does have some dry skin bilaterally.  She has 5 more days of cephalexin.  She was able to postpone her urologic surgery until the 18th.  She denies any fever or chills, spreading redness, nausea vomiting, shortness of breath.    She will see endocrinology at the end of this month.    Review of Systems   See above      Objective           Vitals:  No vitals were obtained today due to virtual visit.    Physical Exam   GENERAL: Healthy, alert and no distress  EYES: Eyes grossly normal to inspection.  No discharge or erythema, or obvious scleral/conjunctival abnormalities.  RESP: No audible wheeze, cough, or visible cyanosis.  No visible retractions or increased work of breathing.    SKIN: Visible skin clear. No significant rash, abnormal pigmentation or lesions. Left leg with improved swelling and erythema, some dry/flaky skin bilaterally  NEURO: Cranial nerves grossly intact.  Mentation and speech appropriate for age.  PSYCH: Mentation appears normal, affect normal/bright, judgement and insight intact, normal speech and appearance well-groomed.          Video-Visit Details    Type of service:  Video Visit   Video Start Time: 11:43  Video End Time:12:04 PM    Originating Location (pt. Location): Home    Distant Location (provider location):  On-site  Platform used for Video Visit: Jenise

## 2023-09-11 DIAGNOSIS — N39.42 URINARY INCONTINENCE WITHOUT SENSORY AWARENESS: Primary | ICD-10-CM

## 2023-09-12 ENCOUNTER — PATIENT OUTREACH (OUTPATIENT)
Dept: CARE COORDINATION | Facility: CLINIC | Age: 78
End: 2023-09-12

## 2023-09-15 ENCOUNTER — HOSPITAL ENCOUNTER (OUTPATIENT)
Dept: ULTRASOUND IMAGING | Facility: CLINIC | Age: 78
Discharge: HOME | End: 2023-09-15
Attending: INTERNAL MEDICINE | Admitting: INTERNAL MEDICINE
Payer: MEDICARE

## 2023-09-15 ENCOUNTER — ANESTHESIA EVENT (OUTPATIENT)
Dept: SURGERY | Facility: AMBULATORY SURGERY CENTER | Age: 78
End: 2023-09-15
Payer: MEDICARE

## 2023-09-15 DIAGNOSIS — I65.22 STENOSIS OF LEFT CAROTID ARTERY: ICD-10-CM

## 2023-09-15 PROCEDURE — 93880 EXTRACRANIAL BILAT STUDY: CPT

## 2023-09-17 NOTE — RESULT ENCOUNTER NOTE
Gerard Gaspar,    I have had the opportunity to review your recent results and an interpretation is as follows:  Your follow up ultrasound shows persistent carotid stenosis bilaterally.  Continue with management of vascular disease as per cardiology recommendations.  With medical therapy.  Continue blood pressure control, continue cholesterol management with Zetia and fenofibrate.  And continue aspirin 81 mg.  Recommend rechecking carotid ultrasound again next year.    Sincerely,  Shola Benoit MD

## 2023-09-18 ENCOUNTER — HOSPITAL ENCOUNTER (OUTPATIENT)
Facility: AMBULATORY SURGERY CENTER | Age: 78
Discharge: HOME | End: 2023-09-18
Attending: UROLOGY
Payer: MEDICARE

## 2023-09-18 ENCOUNTER — ANESTHESIA (OUTPATIENT)
Dept: SURGERY | Facility: AMBULATORY SURGERY CENTER | Age: 78
End: 2023-09-18
Payer: MEDICARE

## 2023-09-18 VITALS
SYSTOLIC BLOOD PRESSURE: 149 MMHG | OXYGEN SATURATION: 99 % | DIASTOLIC BLOOD PRESSURE: 55 MMHG | HEIGHT: 60 IN | WEIGHT: 201 LBS | BODY MASS INDEX: 39.46 KG/M2 | TEMPERATURE: 98 F | RESPIRATION RATE: 16 BRPM | HEART RATE: 64 BPM

## 2023-09-18 DIAGNOSIS — N32.81 OAB (OVERACTIVE BLADDER): Primary | ICD-10-CM

## 2023-09-18 LAB
GLUCOSE BLDC GLUCOMTR-MCNC: 167 MG/DL (ref 70–99)
GLUCOSE BLDC GLUCOMTR-MCNC: 180 MG/DL (ref 70–99)

## 2023-09-18 PROCEDURE — 99000 SPECIMEN HANDLING OFFICE-LAB: CPT | Performed by: PATHOLOGY

## 2023-09-18 PROCEDURE — 82962 GLUCOSE BLOOD TEST: CPT | Performed by: PATHOLOGY

## 2023-09-18 PROCEDURE — 52287 CYSTOSCOPY CHEMODENERVATION: CPT

## 2023-09-18 PROCEDURE — 82962 GLUCOSE BLOOD TEST: CPT | Mod: GZ

## 2023-09-18 RX ORDER — ONDANSETRON 4 MG/1
4 TABLET, ORALLY DISINTEGRATING ORAL EVERY 30 MIN PRN
Status: DISCONTINUED | OUTPATIENT
Start: 2023-09-18 | End: 2023-09-19 | Stop reason: HOSPADM

## 2023-09-18 RX ORDER — HYDROMORPHONE HYDROCHLORIDE 1 MG/ML
0.2 INJECTION, SOLUTION INTRAMUSCULAR; INTRAVENOUS; SUBCUTANEOUS EVERY 5 MIN PRN
Status: DISCONTINUED | OUTPATIENT
Start: 2023-09-18 | End: 2023-09-19 | Stop reason: HOSPADM

## 2023-09-18 RX ORDER — ACETAMINOPHEN 325 MG/1
975 TABLET ORAL ONCE
Status: DISCONTINUED | OUTPATIENT
Start: 2023-09-18 | End: 2023-09-19 | Stop reason: HOSPADM

## 2023-09-18 RX ORDER — FENTANYL CITRATE 50 UG/ML
50 INJECTION, SOLUTION INTRAMUSCULAR; INTRAVENOUS EVERY 5 MIN PRN
Status: DISCONTINUED | OUTPATIENT
Start: 2023-09-18 | End: 2023-09-19 | Stop reason: HOSPADM

## 2023-09-18 RX ORDER — SODIUM CHLORIDE, SODIUM LACTATE, POTASSIUM CHLORIDE, CALCIUM CHLORIDE 600; 310; 30; 20 MG/100ML; MG/100ML; MG/100ML; MG/100ML
INJECTION, SOLUTION INTRAVENOUS CONTINUOUS
Status: DISCONTINUED | OUTPATIENT
Start: 2023-09-18 | End: 2023-09-19 | Stop reason: HOSPADM

## 2023-09-18 RX ORDER — ONDANSETRON 2 MG/ML
4 INJECTION INTRAMUSCULAR; INTRAVENOUS EVERY 30 MIN PRN
Status: DISCONTINUED | OUTPATIENT
Start: 2023-09-18 | End: 2023-09-19 | Stop reason: HOSPADM

## 2023-09-18 RX ORDER — OXYCODONE HYDROCHLORIDE 5 MG/1
10 TABLET ORAL
Status: DISCONTINUED | OUTPATIENT
Start: 2023-09-18 | End: 2023-09-19 | Stop reason: HOSPADM

## 2023-09-18 RX ORDER — OXYCODONE HYDROCHLORIDE 5 MG/1
5 TABLET ORAL
Status: DISCONTINUED | OUTPATIENT
Start: 2023-09-18 | End: 2023-09-19 | Stop reason: HOSPADM

## 2023-09-18 RX ORDER — LIDOCAINE 40 MG/G
CREAM TOPICAL
Status: DISCONTINUED | OUTPATIENT
Start: 2023-09-18 | End: 2023-09-19 | Stop reason: HOSPADM

## 2023-09-18 RX ORDER — AMOXICILLIN 250 MG
1-2 CAPSULE ORAL 2 TIMES DAILY
Qty: 30 TABLET | Refills: 0 | Status: CANCELLED | OUTPATIENT
Start: 2023-09-18

## 2023-09-18 RX ORDER — LIDOCAINE HYDROCHLORIDE 20 MG/ML
INJECTION, SOLUTION INFILTRATION; PERINEURAL PRN
Status: DISCONTINUED | OUTPATIENT
Start: 2023-09-18 | End: 2023-09-18

## 2023-09-18 RX ORDER — HYDROMORPHONE HYDROCHLORIDE 1 MG/ML
0.4 INJECTION, SOLUTION INTRAMUSCULAR; INTRAVENOUS; SUBCUTANEOUS EVERY 5 MIN PRN
Status: DISCONTINUED | OUTPATIENT
Start: 2023-09-18 | End: 2023-09-19 | Stop reason: HOSPADM

## 2023-09-18 RX ORDER — ACETAMINOPHEN 325 MG/1
650 TABLET ORAL EVERY 4 HOURS PRN
Qty: 50 TABLET | Refills: 0 | Status: CANCELLED | OUTPATIENT
Start: 2023-09-18

## 2023-09-18 RX ORDER — FENTANYL CITRATE 50 UG/ML
25 INJECTION, SOLUTION INTRAMUSCULAR; INTRAVENOUS EVERY 5 MIN PRN
Status: DISCONTINUED | OUTPATIENT
Start: 2023-09-18 | End: 2023-09-19 | Stop reason: HOSPADM

## 2023-09-18 RX ORDER — PROPOFOL 10 MG/ML
INJECTION, EMULSION INTRAVENOUS CONTINUOUS PRN
Status: DISCONTINUED | OUTPATIENT
Start: 2023-09-18 | End: 2023-09-18

## 2023-09-18 RX ORDER — ACETAMINOPHEN 325 MG/1
975 TABLET ORAL ONCE
Status: COMPLETED | OUTPATIENT
Start: 2023-09-18 | End: 2023-09-18

## 2023-09-18 RX ORDER — PROPOFOL 10 MG/ML
INJECTION, EMULSION INTRAVENOUS PRN
Status: DISCONTINUED | OUTPATIENT
Start: 2023-09-18 | End: 2023-09-18

## 2023-09-18 RX ADMIN — PROPOFOL 100 MCG/KG/MIN: 10 INJECTION, EMULSION INTRAVENOUS at 10:33

## 2023-09-18 RX ADMIN — SODIUM CHLORIDE, SODIUM LACTATE, POTASSIUM CHLORIDE, CALCIUM CHLORIDE: 600; 310; 30; 20 INJECTION, SOLUTION INTRAVENOUS at 09:58

## 2023-09-18 RX ADMIN — ACETAMINOPHEN 975 MG: 325 TABLET ORAL at 09:45

## 2023-09-18 RX ADMIN — PROPOFOL 40 MG: 10 INJECTION, EMULSION INTRAVENOUS at 10:40

## 2023-09-18 RX ADMIN — PROPOFOL 50 MG: 10 INJECTION, EMULSION INTRAVENOUS at 10:33

## 2023-09-18 RX ADMIN — PROPOFOL 30 MG: 10 INJECTION, EMULSION INTRAVENOUS at 10:45

## 2023-09-18 RX ADMIN — LIDOCAINE HYDROCHLORIDE 60 MG: 20 INJECTION, SOLUTION INFILTRATION; PERINEURAL at 10:33

## 2023-09-18 ASSESSMENT — LIFESTYLE VARIABLES: TOBACCO_USE: 0

## 2023-09-18 ASSESSMENT — COPD QUESTIONNAIRES: COPD: 0

## 2023-09-18 ASSESSMENT — ENCOUNTER SYMPTOMS: ORTHOPNEA: 0

## 2023-09-18 NOTE — DISCHARGE INSTRUCTIONS
"Bladder botox    Activity  - No strenuous exercise for 3-5 days.  - No lifting, pushing, pulling more than 15 pounds for 3-5 days.   - Do not strain with bowel movements.  - You may notice more blood in the urine with increasing physical activity. This is normal and is not a cause for concern unless the urine becomes dark maroon in color, contains clots larger than a quarter, or if you cannot urinate.   - Do not drive until you can press the brake pedal quickly and fully without pain.   - Do not operate a motor vehicle while taking narcotic pain medications.   - Some blood in the urine is expected. Increase your fluid intake to help flush the blood out of your bladder    Medications  - No narcotic pain medications are required and over the counter tylenol should suffice.  Wean yourself off all pain medications as you are able.  - Some pain medications contain both tylenol (acetaminophen) and a narcotic (Norco, vicodin, percocet), do not take more than 4,000mg of Tylenol (acetaminophen) from all sources in any 24 hour period.  - Take over the counter fiber (metamucil or benefiber) and stool softeners (miralax, docusate or senna) to prevent postoperative constipation, but stop if you develop diarrhea.  - No driving or operating machinery while taking narcotic pain medications     Follow-Up:  - Follow up with Dr Zendejas when your symptoms return for your next botox injection  - Call or return sooner than your regularly scheduled visit if you develop any of the following: fever (greater than 101.5), uncontrolled pain, uncontrolled nausea or vomiting, as well as worsening blood in the urine    Phone numbers:   - Monday through Friday 8am to 4:30pm: Call [phone] with questions, requests for medication refills, or to schedule or confirm an appointment.  - Nights or weekends: call the after hours emergency pager - [phone] and tell the  \"I would like to page the Urology Resident on call.\" Please note, due to " prescribing laws, resident physicians are unable to prescribe narcotics after-hours. If you feel as though you will need a refill of a narcotic pain medication, you will need to call the clinic during business hours OR seek emergency care.  - For emergencies, call 911        [facility]  Home Care Following Anesthesia  For 24 hours after surgery:  Get plenty of rest.  A responsible adult must stay with you for at least 24 hours after you leave the surgery center.  Do not drive or use heavy equipment.  If you have weakness or tingling, don't drive or use heavy equipment until this feeling goes away.   Do not drink alcohol.   Avoid strenuous or risky activities.  Ask for help when climbing stairs.  You may feel lightheaded.  IF so, sit for a few minutes before standing.  Have someone help you get up.   If you have nausea (feel sick to your stomach): Drink only clear liquids such as apple juice, ginger ale, broth or 7-Up.  Rest may also help.  Be sure to drink enough fluids.  Move to a regular diet as you feel able.   You may have a slight fever.  Call the doctor if your fever is over 100 F (37.7 C) (taken under the tongue) or lasts longer than 24 hours.  You may have a dry mouth, a sore throat, muscle aches or trouble sleeping. These should go away after 24 hours.  Do not make important or legal decisions.   It is recommended to avoid smoking.               Tips for taking pain medications  To get the best pain relief possible, remember these points:  Take pain medications as directed, before pain becomes severe.  Pain medication can upset your stomach: taking it with food may help.  Constipation is a common side effect of pain medication. Drink plenty of  fluids.  Eat foods high in fiber. Take a stool softener if recommended by your doctor or pharmacist.  Do not drink alcohol, drive or operate machinery while taking pain medications.  Ask about other ways to control pain, such as  with heat, ice or relaxation.    Tylenol/Acetaminophen Consumption    If you feel your pain relief is insufficient, you may take Tylenol/Acetaminophen in addition to your narcotic pain medication.   Be careful not to exceed 4,000 mg of Tylenol/Acetaminophen in a 24 hour period from all sources.  If you are taking extra strength Tylenol/acetaminophen (500 mg), the maximum dose is 8 tablets in 24 hours.  If you are taking regular strength acetaminophen (325 mg), the maximum dose is 12 tablets in 24 hours.  Received 975 mg of Tylenol at 9:45 AM. Next dose is available at 3:45 PM as needed per package instruction.    Call a doctor for any of the following:  Signs of infection (fever, growing tenderness at the surgery site, a large amount of drainage or bleeding, severe pain, foul-smelling drainage, redness, swelling).  It has been over 8 to 10 hours since surgery and you are still not able to urinate (pass water).  Headache for over 24 hours.  Numbness, tingling or weakness the day after surgery (if you had spinal anesthesia).  Signs of Covid-19 infection (temperature over 100 degrees, shortness of breath, cough, loss of taste/smell, generalized body aches, persistent headache, chills, sore throat, nausea/vomiting/diarrhea)  Your doctor is:  Dr. Mishel Zendejas, Prostate and Urology: [phone]                    Or dial [phone] and ask for the resident on call for:  Prostate Urology  For emergency care, call the:  [facility]:  [phone] (TTY for hearing impaired: [phone])

## 2023-09-18 NOTE — ANESTHESIA POSTPROCEDURE EVALUATION
Patient: Chasity Hodgson    Procedure: Procedure(s):  CYSTOSCOPY, WITH BOTULINUM TOXIN INJECTION       Anesthesia Type:  No value filed.    Note:  Disposition: Outpatient   Postop Pain Control: Uneventful            Sign Out: Well controlled pain   PONV: No   Neuro/Psych: Uneventful            Sign Out: Acceptable/Baseline neuro status   Airway/Respiratory: Uneventful            Sign Out: Acceptable/Baseline resp. status   CV/Hemodynamics: Uneventful            Sign Out: Acceptable CV status; No obvious hypovolemia; No obvious fluid overload   Other NRE:    DID A NON-ROUTINE EVENT OCCUR?            Last vitals:  Vitals Value Taken Time   /56 09/18/23 1058   Temp 36.7  C (98.1  F) 09/18/23 1058   Pulse 67 09/18/23 1058   Resp 16 09/18/23 1058   SpO2 99 % 09/18/23 1058       Electronically Signed By: Susi Hart MD  September 18, 2023  12:00 PM

## 2023-09-18 NOTE — ANESTHESIA PREPROCEDURE EVALUATION
Anesthesia Pre-Procedure Evaluation    Patient: Chasity Hodgson   MRN: [id number] : 1945        Procedure : Procedure(s):  CYSTOSCOPY, WITH BOTULINUM TOXIN INJECTION          Past Medical History:   Diagnosis Date    Abdominal adhesions , ,    s/p lysis    Abdominal adhesions     Acne rosacea     Allergic rhinitis     Allergic rhinitis, cause unspecified     Alopecia     Anemia     CAD (coronary artery disease)     Carotid stenosis     CKD (chronic kidney disease) stage 2, GFR 60-89 ml/min     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)     CPAP (continuous positive airway pressure) dependence     Depression     Diabetic gastroparesis (H)     Diet-controlled type 2 diabetes mellitus (H)     DM2 (diabetes mellitus, type 2) (H)     DVT (deep venous thrombosis) (H)     DVT of axillary vein, acute right (H)     Fibromyalgia     Gastro-oesophageal reflux disease     GERD (gastroesophageal reflux disease)     Hernia of unspecified site of abdominal cavity without mention of obstruction or gangrene     bilateral    Hernia, abdominal     History of blood transfusion     10/ 1980    History of thrombophlebitis     HTN (hypertension)     HTN (hypertension)     Hypertriglyceridemia     Hypertriglyceridemia     Hypokalemia     Hypothyroidism     Mumps     Obstructive sleep apnea     ANNETTE (obstructive sleep apnea)     ANNETTE on CPAP     Osteoarthritis of glenohumeral joint     Papillary carcinoma of thyroid (H)     s/p thyroidectomy - Ruegemer    Papillary carcinoma of thyroid (H)     PE (pulmonary embolism)     Poliomyelitis     poor circulation right leg    Poliomyelitis     Postsurgical hypothyroidism     s/p papillary thryoid cancer - Ruegemer    Pulmonary embolism (H)     Pulmonary embolus (H)     Pulmonary nodule     Rosacea     S/P carpal tunnel release     bilateral    S/P hardware removal 2014    still with lingering foot pain    S/P shoulder surgery     bilateral    Septic joint (H)     right knee    Septic joint of right  knee joint (H)     Venous insufficiency     Venous insufficiency     Venous thrombosis 1999    right axillary vein      Past Surgical History:   Procedure Laterality Date    AMPUTATE TOE(S)  03/15/2012    Procedure:AMPUTATE TOE(S); Surgeon:SUKHDEEP METZ; Location: OR    AMPUTATE TOE(S)      APPENDECTOMY  1972    APPENDECTOMY      ARTHRODESIS FOOT  03/15/2012    Procedure:ARTHRODESIS FOOT; RIGHT TRIPLE ARTHRODESIS, FIFTH TOE AMPUTATION, LATERAL LIGAMENT RECONSTRUCTION, TENDON TRANSFER AND RELEASE [MINI C-ARM, ARTHREX 4.5 AND 6.7 STAPLES, BIOCOMPOSITE TENODESIS SCREWS]; Surgeon:SUKHDEEP METZ; Location: OR    ARTHRODESIS FOOT Right     Right foot triple arthrodesis and removal of hardware    ARTHROSCOPY SHOULDER  06/25/2015    REVISION SUBACROMIAL DECOMPRESSION, EXCISION OF GANGLION CYST, DEBRIDEMENT AND EXCISION OF THE GLENOHUMERAL JOINT GANGLION CYST, CORACOID DECOMPRESSION, POSSIBLE SUBSCAPULARIS REPAIR AND OPEN SUBSCAPULARIS BICEP    ARTHROSCOPY SHOULDER ROTATOR CUFF REPAIR      BIOPSY  Thyroid 2002    BLADDER SURGERY      BREAST SURGERY  Biopsy    CHOLECYSTECTOMY      CHOLECYSTECTOMY      COLONOSCOPY  2018    COLOSTOMY  02/07/2012    Procedure:COLOSTOMY; CREATION OF SIGMOID COLOSTOMY AND EXTENSIVE  LYSIS OF ADHESIONS; Surgeon:MONTSERRAT BENDER P; Location: OR    COLOSTOMY      CYSTOSCOPY      EYE SURGERY      GENITOURINARY SURGERY  1999    GI SURGERY      weakened rectal sphincter with artificial stimulator    HERNIA REPAIR  1976    HYSTERECTOMY TOTAL ABDOMINAL      LAPAROTOMY, LYSIS ADHESIONS, COMBINED  02/07/2012    Procedure:COMBINED LAPAROTOMY, LYSIS ADHESIONS; Surgeon:MONTSERRAT BENDER P; Location: OR    RELEASE CARPAL TUNNEL      RELEASE TENDON FOOT  03/15/2012    Procedure:RELEASE TENDON FOOT; Surgeon:SUKHDEEP METZ; Location: OR    REMOVE HARDWARE FOOT  12/13/2012    Procedure: REMOVE HARDWARE FOOT;  RIGHT FOOT REMOVAL OF HARDWARE;  Surgeon: Sukhdeep Metz,  "MD;  Location: [facility] OR    SHOULDER SURGERY  11/12/2020    LEFT SHOULDER HEMIARHTROPLASTY, BICEP TENODESIS    SOFT TISSUE SURGERY  2018, 2020    TENDON RELEASE      foot    THYROIDECTOMY      ZZC FREEING BOWEL ADHESION,ENTEROLYSIS      1986, 1996, 1999    ZZC NONSPECIFIC PROCEDURE      throidectomy    ZZC TOTAL ABDOM HYSTERECTOMY  1980    + BSO      Allergies   Allergen Reactions    Ketorolac Tromethamine Difficulty breathing     Shortness of breath    Nsaids Difficulty breathing     Increased creatinine    Celecoxib Itching and Rash    Morphine And Related Itching     With higher doses    Crestor [Rosuvastatin] Muscle Pain (Myalgia)    No Clinical Screening - See Comments Itching     Fragrance    Vioxx Other (See Comments)     Heart races    Conjugated Estrogens Rash    Sulfa Antibiotics Rash      Social History     Tobacco Use    Smoking status: Never    Smokeless tobacco: Never   Substance Use Topics    Alcohol use: Never      Wt Readings from Last 1 Encounters:   09/18/23 91.2 kg (201 lb)        Anesthesia Evaluation   Pt has had prior anesthetic. Type: General.    No history of anesthetic complications       ROS/MED HX  ENT/Pulmonary:    (-) tobacco use, asthma, COPD and recent URI   Neurologic:       Cardiovascular:     (+)  hypertension-range: 110-160/50-80/ -   -  - -                                 Previous cardiac testing   Echo: Date: 3/2023 Results:  \"Interpretation Summary     The visual ejection fraction is 65-70%.  Left ventricular systolic function is normal.\"  Stress Test:  Date: 4/2023 Results:  \" The nuclear stress test is negative for inducible myocardial ischemia or infarction.     Left ventricular function is hyperdynamic due to presence of small LV cavity.     A prior study was conducted on 2/6/2018.  This study has changes noted when compared with the prior study. No ischemia seen in current study.\"  ECG Reviewed:  Date: Results:    Cath:  Date: Results:   (-) ZENDEJAS, orthopnea/PND and syncope "   METS/Exercise Tolerance: 3 - Able to walk 1-2 blocks without stopping Comment: RLE weakness from polio - uses walker   Hematologic:       Musculoskeletal:       GI/Hepatic:     (+) GERD, Asymptomatic on medication,                  Renal/Genitourinary:     (+) renal disease, type: CRI, Pt does not require dialysis,           Endo:     (+)  type II DM, Last HgA1c: 8.7, date: 8/2023, Using insulin,  Normal glucose range: ,   thyroid problem, hypothyroidism,           Psychiatric/Substance Use:       Infectious Disease:       Malignancy:       Other:            Physical Exam    Airway        Mallampati: II   TM distance: > 3 FB   Neck ROM: full   Mouth opening: > 3 cm    Respiratory Devices and Support         Dental     Comment: Multiple crowns      B=Bridge, C=Chipped, L=Loose, M=Missing    Cardiovascular          Rhythm and rate: regular and normal     Pulmonary           breath sounds clear to auscultation           OUTSIDE LABS:  CBC:   Lab Results   Component Value Date    WBC 6.9 09/01/2023    WBC 6.9 08/31/2023    HGB 10.9 (L) 09/01/2023    HGB 10.3 (L) 08/31/2023    HCT 32.5 (L) 09/01/2023    HCT 30.7 (L) 08/31/2023     09/01/2023     08/31/2023     BMP:   Lab Results   Component Value Date     08/31/2023     08/25/2023    POTASSIUM 4.6 08/31/2023    POTASSIUM 4.5 08/25/2023    CHLORIDE 105 08/31/2023    CHLORIDE 103 08/25/2023    CO2 25 08/31/2023    CO2 23 08/25/2023    BUN 28.0 (H) 08/31/2023    BUN 28.7 (H) 08/25/2023    CR 1.37 (H) 08/31/2023    CR 1.24 (H) 08/25/2023     (H) 09/01/2023     (H) 09/01/2023     COAGS:   Lab Results   Component Value Date    PTT 27 02/23/2018    INR 0.98 02/23/2018     POC:   Lab Results   Component Value Date     (H) 08/05/2019     HEPATIC:   Lab Results   Component Value Date    ALBUMIN 4.0 06/28/2023    PROTTOTAL 6.6 06/28/2023    ALT 21 06/28/2023    AST 28 06/28/2023    ALKPHOS 42 06/28/2023    BILITOTAL 0.4  06/28/2023     OTHER:   Lab Results   Component Value Date    A1C 8.7 (H) 08/25/2023    RINKU 9.2 08/31/2023    PHOS 3.8 03/09/2023    MAG 2.2 08/31/2023    LIPASE 15 11/20/2020    TSH 5.66 (H) 06/28/2023    T4 1.91 (H) 06/28/2023    CRP 7.2 08/01/2018    SED 17 01/20/2023       Anesthesia Plan    ASA Status:  3    NPO Status:  NPO Appropriate    Anesthesia Type: MAC.     - Reason for MAC: straight local not clinically adequate              Consents    Anesthesia Plan(s) and associated risks, benefits, and realistic alternatives discussed. Questions answered and patient/representative(s) expressed understanding.     - Discussed:     - Discussed with:  Patient            Postoperative Care            Comments:    Other Comments: Discussed plan for MAC, including possibility of awareness, risk of aspiration pneumonia, risk of hypoxia/low oxygen/airway obstruction requiring additional airway support/devices. Discussed alternatives. Discussed backup plan of general anesthesia and risks of general anesthesia, including sore throat/hoarse voice, abrasions/damage to lips/tongue/teeth, nausea, rare complications (including cardiac, pulmonary). Ensured understanding, invited questions and all questions were answered.           H&P reviewed: Unable to attach H&P to encounter due to EHR limitations. H&P Update: appropriate H&P reviewed, patient examined. No interval changes since H&P (within 30 days).         Susi Hart MD

## 2023-09-18 NOTE — ANESTHESIA CARE TRANSFER NOTE
Patient: Chasity Hodgson    Procedure: Procedure(s):  CYSTOSCOPY, WITH BOTULINUM TOXIN INJECTION       Diagnosis: Mixed incontinence [N39.46]  OAB (overactive bladder) [N32.81]  Diagnosis Additional Information: No value filed.    Anesthesia Type:   No value filed.     Note:    Oropharynx: oropharynx clear of all foreign objects and spontaneously breathing  Level of Consciousness: drowsy  Oxygen Supplementation: room air    Independent Airway: airway patency satisfactory and stable  Dentition: dentition unchanged  Vital Signs Stable: post-procedure vital signs reviewed and stable  Report to RN Given: handoff report given  Patient transferred to: Phase II  Comments: Vital signs per nursing documentation.       Handoff Report: Identifed the Patient, Identified the Reponsible Provider, Reviewed the pertinent medical history, Discussed the surgical course, Reviewed Intra-OP anesthesia mangement and issues during anesthesia, Set expectations for post-procedure period and Allowed opportunity for questions and acknowledgement of understanding      Vitals:  Vitals Value Taken Time   BP     Temp 36.7  C (98.1  F) 09/18/23 1058   Pulse 67 09/18/23 1058   Resp 16 09/18/23 1058   SpO2 99 % 09/18/23 1058       Electronically Signed By: MAGALY Milan CRNA  September 18, 2023  10:59 AM

## 2023-09-19 ENCOUNTER — THERAPY VISIT (OUTPATIENT)
Dept: OCCUPATIONAL THERAPY | Facility: CLINIC | Age: 78
End: 2023-09-19
Attending: PHYSICIAN ASSISTANT
Payer: MEDICARE

## 2023-09-19 DIAGNOSIS — I89.0 LYMPHEDEMA: ICD-10-CM

## 2023-09-19 PROCEDURE — 97165 OT EVAL LOW COMPLEX 30 MIN: CPT | Mod: GO | Performed by: OCCUPATIONAL THERAPIST

## 2023-09-19 PROCEDURE — 97140 MANUAL THERAPY 1/> REGIONS: CPT | Mod: GO | Performed by: OCCUPATIONAL THERAPIST

## 2023-09-19 NOTE — PROGRESS NOTES
OCCUPATIONAL THERAPY EVALUATION  Type of Visit: Evaluation    See electronic medical record for Abuse and Falls Screening details.    Subjective      Presenting condition or subjective complaint: Resent case of cellulitis (swelling, hot to touch, redness, some leakage) that the oral Keflex didn t help and I was admitted to the hospital for IV antibiotics. It is much better but I do get some swelling. (End of August)  Date of onset: 01/06/23 (chronic; has had for years)    Relevant medical history: Arthritis; Bladder or bowel problems; Cancer; Change in skin color; Cold or hot arm or leg; Diabetes; Dizziness; DVT (blood clot); Fibromyalgia; Foot drop; High blood pressure; Incontinence; Kidney disease; Migraines or headaches; Osteoarthritis; Overweight; Radiation treatment; Severe headaches; Sleep disorder like apnea; Thyroid problems; Vision problems   Dates & types of surgery: Appectomy 1972, Gallbladder surg 1974, Hyaterectomy 1980, carpal tunnel bilateral ?? Date, exploratory 1984, Right foot surgery 2012, Colostomy 3/2012, Left hip replacement 5/2020, Left shoulder replacement 11/2020. Exploratory 1999    Prior diagnostic imaging/testing results:       Prior therapy history for the same diagnosis, illness or injury: [facility]had order for velcro/compression garments, also has pneumatic compression pump but can't don on R side (planning to call [employer])    Prior Level of Function  Transfers: Independent  Ambulation: Independent  ADL: Independent  IADL: Housekeeping, Laundry, Meal preparation, Medication management, uses [employer] mobility for transportation; family assists with heavier cleaning/home maintenance    Living Environment  Social support: Alone   Type of home: [facility]   Stairs to enter the home: Yes 3 Is there a railing: Yes   Ramp: No   Stairs inside the home: Yes 15 Is there a railing: Yes   Help at home: None  Equipment owned: Straight Cane; Walker; Walker with wheels;  Bedrail; Grab bars; Commode; Bath bench; Lift chair     Employment: No    Hobbies/Interests: Sewing, alecia,    Patient goals for therapy: The hardest thing for me is trying to keep my legs elevated and still trying to get other important tasks done.    Pain assessment:  pain in L shoulder- 6/10 at time of session; takes tylenol to manage     Objective       EDEMA EVALUATION  Additional history:  Body part affected by edema: Legs  If cancer related, treatment:    If not cancer related, problems with veins or cause of swelling: Poor circulation veins  Distance able to walk: 1/2 block  Time able to stand: 15 minutes  Sensation problems in hands/feet: No    Edema etiology: Chronic Venous Insufficiency, Infection, Insidious, Surgery, TRACIE    FUNCTIONAL SCALES  Lower Extremity Functional Scale (score out of 80). A lower score indicates greater impairment:    Shoulder Pain and Disability Index (score out of 100).  A higher score indicates greater impairment:      Cognitive Status Examination  Orientation: Oriented to person, place and time   Level of Consciousness: Alert  Follows Commands and Answers Questions: 100% of the time  Personal Safety and Judgement: Intact  Memory: Intact    EDEMA  Skin Condition: Dryness, Pitting, slightly red area on L shin and R shin (has greatly reduced since cellulitis onset, pt has now finished antibiotics course)  Scar: Yes  R foot;  L hip, B shoulders; abdomen, wrists (carpal tunnel release)  Capillary Refill: Symmetrical  Dorsal Pedal Pulse: Symmetrical  Stemmer Sign: +  Ulceration:  small abrasion L wooten    GIRTH MEASUREMENTS: Refer to separate girth measurement flowsheet.       RANGE OF MOTION:  decreased active ROM on R LE due to post polio; able to functionally reach B feet ( did own wraps last time she was here )  STRENGTH: UE Strength WFL  POSTURE: Sitting Posture: Forward head  PALPATION: spongy feet, ankles, pre-tibial area  ACTIVITIES OF DAILY LIVING: I- MI (sits to  shower)  BED MOBILITY: Independent  TRANSFERS: Independent  GAIT/LOCOMOTION: MI with FWW or 4WW  BALANCE:  impaired balance at baseline-uses 4WW MI at time of session  SENSATION: LE Sensation WNL  VASCULAR: Vascular concerns  COORDINATION: WFL  MUSCLE TONE: WFL    Assessment & Plan   CLINICAL IMPRESSIONS  Medical Diagnosis: Lymphedema    Treatment Diagnosis: Lymphedema    Impression/Assessment:  Pt is a 76 yo f who was recently hospitalized for L LE cellulitis (given antibiotics and instructed to elevate LE). Additional PMH includes recurrent cellulitis, DM2, and lymphedema. She also experienced L shoulder pain while in the hospital-planning to follow up with orthopedic PA.  Pt has been seen for tx at this clinic before, most recently April 2023. Pts BLE lymphedema is mainly related to CVI. 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 Pt has history of arthritis and L TRACIE. History thyroid cancer, diabetes, and cellulitis infections LEs as well contributing to her LB lymphedema     Clinical Decision Making (Complexity):  Assessment of Occupational Performance: 1-3 Performance Deficits  Occupational Performance Limitations: risk for decreased mobility, risk for infection  Clinical Decision Making (Complexity): Low complexity    PLAN OF CARE  Treatment Interventions:  Interventions: Therapeutic Exercise, Gradient Compression Bandaging, Manual Therapy    Long Term Goals   OT Goal 1  Goal Identifier: GCB  Goal Description: To reduce volume of lymphedema and risk of soft tissue fibrosis, pt will tolerate up to 23hr/day wear gradient compression bandaging (GCB) of B LE.  Target Date: 12/17/23  OT Goal 2  Goal Identifier: compression  Goal Description: Pt will be fitted for new B LE compression garments and will demonstrate independence with donning/doffing and care of garments.  Target Date: 12/17/23  OT Goal 3  Goal Identifier:  education  Goal Description: Pt demonstrates awareness of individualized lymphedema precautions based on personal risk factors and when to seek medical attention for assessment of early signs of lymphedema or cellulitis of the affected region.  Target Date: 12/17/23  OT Goal 4  Goal Identifier: home program  Goal Description: Pt will demonstrate understanding of long term management of edema including manual techniques, compression pump if applicable, compression garments, and skin care.  Target Date: 12/17/23      Frequency of Treatment: 3x/week; 1x/week  Duration of Treatment: up to 90 days     Recommended Referrals to Other Professionals:  no additional referrals indicated at time of eval  Education Assessment:       Risks and benefits of evaluation/treatment have been explained.   Patient/Family/caregiver agrees with Plan of Care.     Evaluation Time:    OT Eval, Low Complexity Minutes (16135): 27    Signing Clinician: JIAN Ramirez      [facility]                                                                                   OUTPATIENT OCCUPATIONAL THERAPY      PLAN OF TREATMENT FOR OUTPATIENT REHABILITATION   Patient's Last Name, First Name, Alyson Childseda SU YOB: 1945   Provider's Name   [facility]   Medical Record No.  [id number]     Onset Date: 01/06/23 (chronic; has had for years) Start of Care Date: 09/19/23     Medical Diagnosis:  Lymphedema      OT Treatment Diagnosis:  Lymphedema Plan of Treatment  Frequency/Duration:3x/week; 1x/week/up to 90 days    Certification date from 09/19/23   To 12/17/23        See note for plan of treatment details and functional goals     JIAN Ramirez                         I CERTIFY THE NEED FOR THESE SERVICES FURNISHED UNDER        THIS PLAN OF TREATMENT AND WHILE UNDER MY CARE .             Physician Signature                Date    X_____________________________________________________                    Referring Provider:  Maude Joaquin *      Initial Assessment  See Epic Evaluation- 09/19/23

## 2023-09-20 ASSESSMENT — ENCOUNTER SYMPTOMS
SPEECH CHANGE: 0
COUGH DISTURBING SLEEP: 0
WEIGHT LOSS: 0
CHILLS: 1
NERVOUS/ANXIOUS: 1
HEMOPTYSIS: 0
ORTHOPNEA: 1
PANIC: 0
SEIZURES: 0
BLOATING: 0
FLANK PAIN: 0
TROUBLE SWALLOWING: 1
PARALYSIS: 0
BREAST PAIN: 1
BLOOD IN STOOL: 0
BREAST MASS: 0
SINUS CONGESTION: 0
EXERCISE INTOLERANCE: 0
RECTAL PAIN: 1
HOARSE VOICE: 1
NECK PAIN: 1
MUSCLE CRAMPS: 0
EYE IRRITATION: 1
MEMORY LOSS: 0
DECREASED CONCENTRATION: 0
SWOLLEN GLANDS: 1
NIGHT SWEATS: 1
HEADACHES: 1
HEMATURIA: 0
HYPERTENSION: 1
DISTURBANCES IN COORDINATION: 0
HALLUCINATIONS: 0
DYSPNEA ON EXERTION: 0
COUGH: 0
CONSTIPATION: 0
TINGLING: 0
TREMORS: 0
JOINT SWELLING: 0
ALTERED TEMPERATURE REGULATION: 1
JAUNDICE: 0
POLYPHAGIA: 0
WHEEZING: 0
FATIGUE: 1
LIGHT-HEADEDNESS: 1
NECK MASS: 1
LOSS OF CONSCIOUSNESS: 0
NUMBNESS: 0
ARTHRALGIAS: 1
WEAKNESS: 1
ABDOMINAL PAIN: 1
DIFFICULTY URINATING: 0
DEPRESSION: 1
EYE WATERING: 0
POLYDIPSIA: 0
BACK PAIN: 1
NAIL CHANGES: 1
SHORTNESS OF BREATH: 1
DECREASED APPETITE: 0
DOUBLE VISION: 0
WEIGHT GAIN: 0
INCREASED ENERGY: 1
SYNCOPE: 0
MUSCLE WEAKNESS: 1
POOR WOUND HEALING: 0
EYE PAIN: 1
DIARRHEA: 1
BOWEL INCONTINENCE: 0
NAUSEA: 1
SKIN CHANGES: 0
SORE THROAT: 1
STIFFNESS: 0
LEG PAIN: 1
MYALGIAS: 1
SPUTUM PRODUCTION: 0
SNORES LOUDLY: 1
INSOMNIA: 0
HYPOTENSION: 1
SINUS PAIN: 0
HEARTBURN: 1
PALPITATIONS: 1
DYSURIA: 0
BRUISES/BLEEDS EASILY: 0
SMELL DISTURBANCE: 0
VOMITING: 0
POSTURAL DYSPNEA: 1
DIZZINESS: 1
EYE REDNESS: 0
SLEEP DISTURBANCES DUE TO BREATHING: 0
TASTE DISTURBANCE: 0

## 2023-09-20 NOTE — TELEPHONE ENCOUNTER
Patient is scheduled for a 4 week post op with Yaima on 10/04 following surgery with Dr. Zendejas on 9/18 and patient wants to confirm she still needs to come to that appointment as this appointment was scheduled to reflect surgery that was originally scheduled on 9/05/23.  Patient asked that she be called back at [phone].    Radha Rivera on 9/20/2023 at 11:11 AM

## 2023-09-22 ENCOUNTER — OFFICE VISIT (OUTPATIENT)
Dept: DERMATOLOGY | Facility: CLINIC | Age: 78
End: 2023-09-22
Payer: MEDICARE

## 2023-09-22 DIAGNOSIS — L23.5 ALLERGIC DERMATITIS DUE TO OTHER CHEMICAL PRODUCT: ICD-10-CM

## 2023-09-22 DIAGNOSIS — L64.9 ANDROGENIC ALOPECIA: ICD-10-CM

## 2023-09-22 DIAGNOSIS — L30.9 DERMATITIS: ICD-10-CM

## 2023-09-22 DIAGNOSIS — L65.9 LOSS OF HAIR: Primary | ICD-10-CM

## 2023-09-22 PROCEDURE — 99213 OFFICE O/P EST LOW 20 MIN: CPT | Performed by: DERMATOLOGY

## 2023-09-22 ASSESSMENT — PAIN SCALES - GENERAL: PAINLEVEL: NO PAIN (0)

## 2023-09-22 NOTE — PROGRESS NOTES
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 Tinea corporis or candida/yeast infection under the breast  - OTC clomitrazole  3. Rosacea - stable  - Vanicream  4.  Venous stasis dermatitis - stable  - Vanicream  5. Allergic contact dermatitis - stable  - Uses fragrance-free products  - Patch testing revealed mild reaction to Balsam of [place], fragrance mix, and a strong reaction to paraphenylenediamine.   6. Xerosis cutis  -Recommend continuing use of a gentle, fragrance-free emollient based moisturizer such as Vanicream or CeraVe.   7. Abrasion , right upper arm 02/15/2021.   - Monitor, photo taken today.   8. Milia, right lateral eye  - Removed 8/16/2021   - prior tx: tretinoin  ____________________________________________    Assessment & Plan:     # Female pattern hair loss- improving  # Hx of allergic contact dermatitis of scalp  Hair loss is overall stable but has undergone some stressful health events recently. Will get back to regimen including increasing use of comb and fluocinolone oil  - Fluocinolone 0.01% oil once per week for scalp pruritus and dryness.  - Continue to use laser headband or comb at least 3 times per week.  - Continue to use of Vanicream shampoo  - Ask cardiologist about restarting Minoxidil. If approved, begin 6 month course of Minoxidil        Procedures Performed:   None    Follow-up: 3-6 month(s) in-person, or earlier for new or changing lesions. Patient has an appointment with Dr. Meyer on January 5,  2024.    Staff and Medical Student:   Attending: Dr. Meyer   Medical Student: Terri Barber, MS3    Staff Physician:  I was present with the medical student who participated in the service and in the documentation of the note. I have verified the history and personally performed the physical exam and medical decision making. I agree with the assessment and plan of care as documented in the note.       Renetta Meyer MD  Professor and Chair  Department of Dermatology  [facility]: Phone: [phone], Fax:[phone]  [facility]: Phone: [phone], Fax: [phone]        ____________________________________________    CC: Hair Loss (Hair loss/cellulitis f/u- steroid ointment for cellulitis burns, but helps scaliness. PCP does not want her taking minoxidil. Has not taken minoxidil for 1 month. )    HPI:  Ms. Chasity Hodgson is a 77 year old female who presents as a return patient for follow-up of hair loss, diagnosed as female pattern hair loss.     Ms. Hodgson had sent a note to a doctor to ask if she should be using Minoxidil, they didn't respond so she started taking the Minoxidil. She took it for about 6-8 weeks, then discontinued on advice of her PCP 3 weeks ago. Seeing a cardiologist in the next two weeks and will get their opinion.     Been having more stress in her life lately because of her  in the nursing home.     She has cellulitis and was recently in the hospital overnight because it flared, she stayed in the hospital for one night with IV antibiotics and 5 days of oral antibiotics. She notes the area still has some discoloration. She noticed the area started bleeding around the end of August, but it has been generally okay since then.     Today Q's for Mitch: ask if she should continue taking Minoxidil     - Last seen 6/2/23 in-clinic.  - Shedding or thinning, or both: thinning and  shedding  - Current tx: laser comb 2x week, using Vanicream shampoo and conditioner, fluocinolone acetonide 0.01% oil once per week     Yes, started Olmesartan in March Any new medications, supplements, or products? (please list below)     No Scalp pain   No Scalp burning   Yes Scalp itching    no Eyebrow changes    no Eyelash changes   N/a Beard changes    Maybe more hair on her chin lately Other body hair changes    Feels like they're getting more brittle Nail changes    no Additional symptoms? (please list below)     - Overall course: Feels her hair has been a little thinner since her last appointment in June. She has been noticing more shedding when brushing her hair. Using the laser comb 2x week and fluocinolone oil  about once a week. Showers 2-3 times a week depending on the weather. 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 Post-polio symptoms have included some leg pain and atrophy. Now using a walker.     Patient is otherwise feeling well, in usual state of health, and has no additional skin concerns today.       - Current treatments: laser comb 2x week, fluocinolone acetonide 0.01% oil once per week   - Prior discontinued treatments: minoxidil   - Scalp or hair care habits/products: Vanicream shampoo/conditioner   - - - Which products? How many times per week? 2-3 x week  - - - Frequency of hair sprays, gels, dyes, heat styling, perms, weaves or extensions? N/a  - - - Sunscreen use on face or scalp? If so, what brand?  Uses Neutrogena SPF 30 on face  - Post menopausal  - Unwanted hair growth/hirsutism: increased chin hair  - Diet (meat, vegetarian, mixed): mixed, watches carbs/sugar because of diabetes  - Recent weight loss: none, trying to lose weight  - Personal history of thyroid dysfunction or autoimmune disease: thyroidectomy  - Family history of hair loss: maternal grandma and mom had thin hair, sister wears wig for thin hair  - Family history of autoimmune disease: none  - Major family, financial,  school/work, or other social stressors in the few months prior to hair loss: stress from  being in nursing home  - Major recent illness/hospitalizations: hospitalized for cellulitis end of August   - COVID status: had in November 2022    Patient is otherwise feeling well, in usual state of health, and has no additional skin concerns today.       Labs:  CBC  and BMP  8/31/23 reviewed.  -CBC: slightly anemic  -BMP: high BUN, high Creatinine, hyperglycemia, low GFR    Physical Exam:  GEN: Well developed, well-nourished, in no acute distress, in a pleasant mood.    SKIN: Focused examination of scalp was performed.  - Blayne part width of 1.2 at front, 1.1  at vertex  - The layers of hair regrowth layers were noted to be  - 1-2 cm for the first  - 3-4 cm at the second  - no diffuse erythema   - no perifollicular erythema  - no perifollicular scale   - no scaling of the scalp   -  normal eyelash density  -  normal eyebrow density  -  scalp folliculitis/pustules   - In comparison to prior photographs, hair density is improved on front forehead, crown of head, and left temple. Stable at back of head and right temple  - No other lesions of concern on areas examined.         Medications:  Current Outpatient Medications   Medication    acetaminophen (TYLENOL) 325 MG tablet    acetaminophen (TYLENOL) 500 MG tablet    acetaminophen-caffeine (EXCEDRIN TENSION HEADACHE) 500-65 MG TABS    amoxicillin (AMOXIL) 500 MG capsule    aspirin (ASA) 81 MG EC tablet    azelastine (ASTEPRO) 0.15 % nasal spray    carvedilol (COREG) 12.5 MG tablet    chlorthalidone (HYGROTON) 25 MG tablet    clotrimazole (LOTRIMIN) 1 % cream    Continuous Blood Gluc Sensor (FREESTYLE BRANDY 3 SENSOR) MISC    empagliflozin (JARDIANCE) 10 MG TABS tablet    ezetimibe (ZETIA) 10 MG tablet    famotidine (PEPCID) 20 MG tablet    fenofibrate (TRICOR) 145 MG tablet    fexofenadine (ALLEGRA) 180 MG tablet    fluocinolone acetonide (DERMA SMOOTHE/FS BODY) 0.01 %  external oil    fluticasone (FLONASE) 50 MCG/ACT spray    furosemide (LASIX) 20 MG tablet    Icosapent Ethyl 1 g CAPS    insulin aspart (NOVOLOG FLEXPEN) 100 UNIT/ML pen    insulin aspart (NOVOLOG FLEXPEN) 100 UNIT/ML pen    insulin glargine (LANTUS VIAL) 100 UNIT/ML vial    insulin pen needle (B-D U/F) 31G X 5 MM miscellaneous    Lidocaine (LIDOCARE) 4 % Patch    MULTIPLE VITAMIN PO    nitroGLYcerin (NITROSTAT) 0.4 MG sublingual tablet    nystatin (MYCOSTATIN) cream    nystatin-triamcinolone (MYCOLOG II) cream    olmesartan (BENICAR) 40 MG tablet    ondansetron (ZOFRAN) 4 MG tablet    order for DME    pantoprazole (PROTONIX) 40 MG EC tablet    Polyethylene Glycol 400 (BLINK TEARS) 0.25 % GEL    polyethylene glycol 400 (BLINK TEARS) 0.25 % SOLN ophthalmic solution    sucralfate (CARAFATE) 1 GM/10ML suspension    SYNTHROID 112 MCG tablet    tretinoin (RETIN-A) 0.025 % external cream    triamcinolone (KENALOG) 0.1 % external ointment    Vitamin D3 50 mcg (2000 units) tablet     No current facility-administered medications for this visit.      Past Medical History:   Patient Active Problem List   Diagnosis    Low back pain    Mixed hyperlipidemia    Benign essential hypertension    Fibromyalgia    Allergic rhinitis    ANNETTE (obstructive sleep apnea)    Cavovarus deformity of foot    History of DVT (deep vein thrombosis)    Hypokalemia    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)    Anemia due to blood loss, acute    ACP (advance care planning)    Postsurgical hypothyroidism    Type 2 diabetes, HbA1c goal < 7% (H)    Gastroparesis    Papillary carcinoma of thyroid (H)    Alopecia    Pulmonary nodule    Esophageal reflux    Dermatitis    Acne rosacea    Diabetic gastroparesis (H)    Poliomyelitis    Left knee pain    Carotid stenosis    Right shoulder pain    Type 2 diabetes mellitus with other specified complication (H)    Insufficiency, arterial, peripheral (H)    Allergic dermatitis due to other chemical product    Normocytic anemia     Morbid obesity with BMI of 40.0-44.9, adult (H)    Mild major depression (H)    CKD (chronic kidney disease) stage 3, GFR 30-59 ml/min (H)    Renal artery stenosis (H)    Neuropathic pain    Mild major depression (H)    Venous insufficiency    Avascular necrosis of bone of hip, left (H)    Contact dermatitis    Diabetic nephropathy associated with type 2 diabetes mellitus (H)    DVT of upper extremity (deep vein thrombosis) (H)    Dysphagia    Edema of lower extremity    History of colonic polyps    History of pulmonary embolus (PE)    Iron deficiency anemia    Inguinal pain    Moderate protein-calorie malnutrition (H)    Osteoarthritis of left hip    Primary insomnia    Right lower quadrant pain    Status post total shoulder arthroplasty, left    Surgical aftercare, musculoskeletal system    Vitamin D deficiency    Lymphedema    Atypical chest pain    Episodic tension-type headache, not intractable    Blurred vision    Pain of cheek    Abdominal adhesions    Mixed incontinence    OAB (overactive bladder)    Cellulitis of lower extremity, unspecified laterality    Cellulitis     Past Medical History:   Diagnosis Date    Abdominal adhesions 1984, 96,99    s/p lysis    Abdominal adhesions     Acne rosacea     Allergic rhinitis     Allergic rhinitis, cause unspecified     Alopecia     Anemia     CAD (coronary artery disease)     Carotid stenosis     CKD (chronic kidney disease) stage 2, GFR 60-89 ml/min     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)     CPAP (continuous positive airway pressure) dependence     Depression     Diabetic gastroparesis (H)     Diet-controlled type 2 diabetes mellitus (H)     DM2 (diabetes mellitus, type 2) (H)     DVT (deep venous thrombosis) (H)     DVT of axillary vein, acute right (H)     Fibromyalgia     Gastro-oesophageal reflux disease     GERD (gastroesophageal reflux disease)     Hernia of unspecified site of abdominal cavity without mention of obstruction or gangrene     bilateral    Hernia, abdominal      History of blood transfusion     10/ 1980    History of thrombophlebitis     HTN (hypertension)     HTN (hypertension)     Hypertriglyceridemia     Hypertriglyceridemia     Hypokalemia     Hypothyroidism     Mumps     Obstructive sleep apnea     ANNETTE (obstructive sleep apnea)     ANNETTE on CPAP     Osteoarthritis of glenohumeral joint     Papillary carcinoma of thyroid (H)     s/p thyroidectomy - Ruegemer    Papillary carcinoma of thyroid (H)     PE (pulmonary embolism)     Poliomyelitis     poor circulation right leg    Poliomyelitis     Postsurgical hypothyroidism     s/p papillary thryoid cancer - Ruegemer    Pulmonary embolism (H)     Pulmonary embolus (H)     Pulmonary nodule     Rosacea     S/P carpal tunnel release     bilateral    S/P hardware removal 01/2014    still with lingering foot pain    S/P shoulder surgery     bilateral    Septic joint (H)     right knee    Septic joint of right knee joint (H)     Venous insufficiency     Venous insufficiency     Venous thrombosis 1999    right axillary vein       CC No referring provider defined for this encounter. on close of this encounter.

## 2023-09-22 NOTE — NURSING NOTE
Dermatology Rooming Note    Chasity Hodgson's goals for this visit include:   Chief Complaint   Patient presents with    Hair Loss     Hair loss/cellulitis f/u- steroid ointment for cellulitis burns, but helps scaliness. PCP does not want her taking minoxidil. Has not taken minoxidil for 1 month.      Radha Franco, EMT

## 2023-09-22 NOTE — LETTER
9/22/2023       RE: Chasity Hodgson  [id number]  [facility] [address]     Dear Colleague,    Thank you for referring your patient, Chasity Hodgson, to the [facility]MAT[facility] [place] [facility]. Please see a copy of my visit note below.    [facility] Dermatology Note  Encounter Date: Sep 22, 2023  Office Visit     Dermatology Problem List:  1. Female pattern hair loss  - Current tx: laser comb up to 3x weekly (switching to band 8/2021), free and clear shampoo, fluocinolone acetonide 0.01% oil once per week  - May start 6 months of oral minoxidil depending on cardiology opinion   - Previously on 1/2 tab of minoxidil 2.5 mg daily, discontinued secondary to elevated creatinine 10/2020, has since stabilized  -Baseline Hair Metrix 8/16/2021   2. Tinea corporis or candida/yeast infection under the breast  - OTC clomitrazole  3. Rosacea - stable  - Vanicream  4.  Venous stasis dermatitis - stable  - Vanicream  5. Allergic contact dermatitis - stable  - Uses fragrance-free products  - Patch testing revealed mild reaction to Bals[place] of [place], fragrance mix, and a strong reaction to paraphenylenediamine.   6. Xerosis cutis  -Recommend continuing use of a gentle, fragrance-free emollient based moisturizer such as Vanicream or CeraVe.   7. Abrasion , right upper arm 02/15/2021.   - Monitor, photo taken today.   8. Milia, right lateral eye  - Removed 8/16/2021   - prior tx: tretinoin  ____________________________________________    Assessment & Plan:     # Female pattern hair loss- improving  # Hx of allergic contact dermatitis of scalp  Hair loss is overall stable but has undergone some stressful health events recently. Will get back to regimen including increasing use of comb and fluocinolone oil  - Fluocinolone 0.01% oil once per week for scalp pruritus and dryness.  - Continue to use laser headband or comb at least 3 times per week.  -  Continue to use of Vanicream shampoo  - Ask cardiologist about restarting Minoxidil. If approved, begin 6 month course of Minoxidil        Procedures Performed:   None    Follow-up: 3-6 month(s) in-person, or earlier for new or changing lesions. Patient has an appointment with Dr. Meyer on January 5, 2024.    Staff and Medical Student:   Attending: Dr. Meyer   Medical Student: Terri Barber, MS3    Staff Physician:  I was present with the medical student who participated in the service and in the documentation of the note. I have verified the history and personally performed the physical exam and medical decision making. I agree with the assessment and plan of care as documented in the note.       Renetta Meyer MD  Professor and Chair  Department of Dermatology  [facility]: Phone: [phone], Fax:[phone]  [facility]: Phone: [phone], Fax: [phone]        ____________________________________________    CC: Hair Loss (Hair loss/cellulitis f/u- steroid ointment for cellulitis burns, but helps scaliness. PCP does not want her taking minoxidil. Has not taken minoxidil for 1 month. )    HPI:  Ms. Chasity Hodgson is a 77 year old female who presents as a return patient for follow-up of hair loss, diagnosed as female pattern hair loss.     Ms. Hodgson had sent a note to a doctor to ask if she should be using Minoxidil, they didn't respond so she started taking the Minoxidil. She took it for about 6-8 weeks, then discontinued on advice of her PCP 3 weeks ago. Seeing a cardiologist in the next two weeks and will get their opinion.     Been having more stress in her life lately because of her  in the nursing home.     She has cellulitis and was recently in the hospital overnight because it flared, she stayed in the hospital for one night with IV antibiotics and 5 days of oral antibiotics. She  notes the area still has some discoloration. She noticed the area started bleeding around the end of August, but it has been generally okay since then.     Today Q's for Mitch: ask if she should continue taking Minoxidil     - Last seen 6/2/23 in-clinic.  - Shedding or thinning, or both: thinning and shedding  - Current tx: laser comb 2x week, using Vanicream shampoo and conditioner, fluocinolone acetonide 0.01% oil once per week     Yes, started Olmesartan in March Any new medications, supplements, or products? (please list below)     No Scalp pain   No Scalp burning   Yes Scalp itching    no Eyebrow changes    no Eyelash changes   N/a Beard changes    Maybe more hair on her chin lately Other body hair changes    Feels like they're getting more brittle Nail changes    no Additional symptoms? (please list below)     - Overall course: Feels her hair has been a little thinner since her last appointment in June. She has been noticing more shedding when brushing her hair. Using the laser comb 2x week and fluocinolone oil  about once a week. Showers 2-3 times a week depending on the weather. 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 Post-polio symptoms have included some leg pain and atrophy. Now using a walker.     Patient is otherwise feeling well, in usual state of health, and has no additional skin concerns today.       - Current treatments: laser comb 2x week, fluocinolone acetonide 0.01% oil once per week   - Prior discontinued treatments: minoxidil   - Scalp or hair care habits/products: Vanicream shampoo/conditioner   - - - Which products? How many times per week? 2-3 x week  - - - Frequency of hair sprays, gels, dyes, heat styling, perms, weaves or extensions? N/a  - - - Sunscreen use on face or scalp? If so, what brand?  Uses Neutrogena SPF 30 on face  - Post menopausal  - Unwanted hair growth/hirsutism: increased chin hair  - Diet (meat, vegetarian, mixed): mixed, watches carbs/sugar because of diabetes  -  Recent weight loss: none, trying to lose weight  - Personal history of thyroid dysfunction or autoimmune disease: thyroidectomy  - Family history of hair loss: maternal grandma and mom had thin hair, sister wears wig for thin hair  - Family history of autoimmune disease: none  - Major family, financial, school/work, or other social stressors in the few months prior to hair loss: stress from  being in nursing home  - Major recent illness/hospitalizations: hospitalized for cellulitis end of August   - COVID status: had in November 2022    Patient is otherwise feeling well, in usual state of health, and has no additional skin concerns today.       Labs:  CBC  and BMP  8/31/23 reviewed.  -CBC: slightly anemic  -BMP: high BUN, high Creatinine, hyperglycemia, low GFR    Physical Exam:  GEN: Well developed, well-nourished, in no acute distress, in a pleasant mood.    SKIN: Focused examination of scalp was performed.  - Blayne part width of 1.2 at front, 1.1  at vertex  - The layers of hair regrowth layers were noted to be  - 1-2 cm for the first  - 3-4 cm at the second  - no diffuse erythema   - no perifollicular erythema  - no perifollicular scale   - no scaling of the scalp   -  normal eyelash density  -  normal eyebrow density  -  scalp folliculitis/pustules   - In comparison to prior photographs, hair density is improved on front forehead, crown of head, and left temple. Stable at back of head and right temple  - No other lesions of concern on areas examined.         Medications:  Current Outpatient Medications   Medication    acetaminophen (TYLENOL) 325 MG tablet    acetaminophen (TYLENOL) 500 MG tablet    acetaminophen-caffeine (EXCEDRIN TENSION HEADACHE) 500-65 MG TABS    amoxicillin (AMOXIL) 500 MG capsule    aspirin (ASA) 81 MG EC tablet    azelastine (ASTEPRO) 0.15 % nasal spray    carvedilol (COREG) 12.5 MG tablet    chlorthalidone (HYGROTON) 25 MG tablet    clotrimazole (LOTRIMIN) 1 % cream    Continuous  Blood Gluc Sensor (FREESTYLE BRANDY 3 SENSOR) MISC    empagliflozin (JARDIANCE) 10 MG TABS tablet    ezetimibe (ZETIA) 10 MG tablet    famotidine (PEPCID) 20 MG tablet    fenofibrate (TRICOR) 145 MG tablet    fexofenadine (ALLEGRA) 180 MG tablet    fluocinolone acetonide (DERMA SMOOTHE/FS BODY) 0.01 % external oil    fluticasone (FLONASE) 50 MCG/ACT spray    furosemide (LASIX) 20 MG tablet    Icosapent Ethyl 1 g CAPS    insulin aspart (NOVOLOG FLEXPEN) 100 UNIT/ML pen    insulin aspart (NOVOLOG FLEXPEN) 100 UNIT/ML pen    insulin glargine (LANTUS VIAL) 100 UNIT/ML vial    insulin pen needle (B-D U/F) 31G X 5 MM miscellaneous    Lidocaine (LIDOCARE) 4 % Patch    MULTIPLE VITAMIN PO    nitroGLYcerin (NITROSTAT) 0.4 MG sublingual tablet    nystatin (MYCOSTATIN) cream    nystatin-triamcinolone (MYCOLOG II) cream    olmesartan (BENICAR) 40 MG tablet    ondansetron (ZOFRAN) 4 MG tablet    order for DME    pantoprazole (PROTONIX) 40 MG EC tablet    Polyethylene Glycol 400 (BLINK TEARS) 0.25 % GEL    polyethylene glycol 400 (BLINK TEARS) 0.25 % SOLN ophthalmic solution    sucralfate (CARAFATE) 1 GM/10ML suspension    SYNTHROID 112 MCG tablet    tretinoin (RETIN-A) 0.025 % external cream    triamcinolone (KENALOG) 0.1 % external ointment    Vitamin D3 50 mcg (2000 units) tablet     No current facility-administered medications for this visit.      Past Medical History:   Patient Active Problem List   Diagnosis    Low back pain    Mixed hyperlipidemia    Benign essential hypertension    Fibromyalgia    Allergic rhinitis    ANNETTE (obstructive sleep apnea)    Cavovarus deformity of foot    History of DVT (deep vein thrombosis)    Hypokalemia    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)    Anemia due to blood loss, acute    ACP (advance care planning)    Postsurgical hypothyroidism    Type 2 diabetes, HbA1c goal < 7% (H)    Gastroparesis    Papillary carcinoma of thyroid (H)    Alopecia    Pulmonary nodule    Esophageal reflux    Dermatitis    Acne  rosacea    Diabetic gastroparesis (H)    Poliomyelitis    Left knee pain    Carotid stenosis    Right shoulder pain    Type 2 diabetes mellitus with other specified complication (H)    Insufficiency, arterial, peripheral (H)    Allergic dermatitis due to other chemical product    Normocytic anemia    Morbid obesity with BMI of 40.0-44.9, adult (H)    Mild major depression (H)    CKD (chronic kidney disease) stage 3, GFR 30-59 ml/min (H)    Renal artery stenosis (H)    Neuropathic pain    Mild major depression (H)    Venous insufficiency    Avascular necrosis of bone of hip, left (H)    Contact dermatitis    Diabetic nephropathy associated with type 2 diabetes mellitus (H)    DVT of upper extremity (deep vein thrombosis) (H)    Dysphagia    Edema of lower extremity    History of colonic polyps    History of pulmonary embolus (PE)    Iron deficiency anemia    Inguinal pain    Moderate protein-calorie malnutrition (H)    Osteoarthritis of left hip    Primary insomnia    Right lower quadrant pain    Status post total shoulder arthroplasty, left    Surgical aftercare, musculoskeletal system    Vitamin D deficiency    Lymphedema    Atypical chest pain    Episodic tension-type headache, not intractable    Blurred vision    Pain of cheek    Abdominal adhesions    Mixed incontinence    OAB (overactive bladder)    Cellulitis of lower extremity, unspecified laterality    Cellulitis     Past Medical History:   Diagnosis Date    Abdominal adhesions 1984, 96,99    s/p lysis    Abdominal adhesions     Acne rosacea     Allergic rhinitis     Allergic rhinitis, cause unspecified     Alopecia     Anemia     CAD (coronary artery disease)     Carotid stenosis     CKD (chronic kidney disease) stage 2, GFR 60-89 ml/min     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)     CPAP (continuous positive airway pressure) dependence     Depression     Diabetic gastroparesis (H)     Diet-controlled type 2 diabetes mellitus (H)     DM2 (diabetes mellitus, type 2) (H)      DVT (deep venous thrombosis) (H)     DVT of axillary vein, acute right (H)     Fibromyalgia     Gastro-oesophageal reflux disease     GERD (gastroesophageal reflux disease)     Hernia of unspecified site of abdominal cavity without mention of obstruction or gangrene     bilateral    Hernia, abdominal     History of blood transfusion     10/ 1980    History of thrombophlebitis     HTN (hypertension)     HTN (hypertension)     Hypertriglyceridemia     Hypertriglyceridemia     Hypokalemia     Hypothyroidism     Mumps     Obstructive sleep apnea     ANNETTE (obstructive sleep apnea)     ANNETTE on CPAP     Osteoarthritis of glenohumeral joint     Papillary carcinoma of thyroid (H)     s/p thyroidectomy - Ruegemer    Papillary carcinoma of thyroid (H)     PE (pulmonary embolism)     Poliomyelitis     poor circulation right leg    Poliomyelitis     Postsurgical hypothyroidism     s/p papillary thryoid cancer - Ruegemer    Pulmonary embolism (H)     Pulmonary embolus (H)     Pulmonary nodule     Rosacea     S/P carpal tunnel release     bilateral    S/P hardware removal 01/2014    still with lingering foot pain    S/P shoulder surgery     bilateral    Septic joint (H)     right knee    Septic joint of right knee joint (H)     Venous insufficiency     Venous insufficiency     Venous thrombosis 1999    right axillary vein       CC No referring provider defined for this encounter. on close of this encounter.      Again, thank you for allowing me to participate in the care of your patient.      Sincerely,    Renetta Meyer MD

## 2023-09-25 ENCOUNTER — DOCUMENTATION ONLY (OUTPATIENT)
Dept: HOME HEALTH SERVICES | Facility: CLINIC | Age: 78
End: 2023-09-25
Payer: MEDICARE

## 2023-09-25 NOTE — PROGRESS NOTES
I SPOKE WITH PATIENT REGARDING CPAP COMPLIANCE.  HER 3 MONTH TRIAL PERIOD WILL BE UP ON FRIDAY AND SHE IS CURRENTLY AT 0%.  SHE NEEDS TO EITHER RETURN HER DEVICE OR RE-QUALIFY.  SHE IS GOING TO RETURN THE CPAP.  SHE IS NOT INTERESTED IN RE-QUALIFYING.

## 2023-09-25 NOTE — PROGRESS NOTES
Patient did not meet the required criteria for continued insurance coverage on the PAP device and related accessories. The equipment has been denied by insurance as not resonable or necessary. Patients who are not compliant with PAP therapy must return their device and requalify for continued coverage. To requalify the patient must:     Complete another F2F appointment with the perscribing provider  2.   Complete another sleep study (titration study accepted)   3.   Start a new 12 week trial and meet insurnace compliance criteria for continued coverage of the PAP device and accessories

## 2023-09-26 ENCOUNTER — PATIENT OUTREACH (OUTPATIENT)
Dept: CARE COORDINATION | Facility: CLINIC | Age: 78
End: 2023-09-26
Payer: MEDICARE

## 2023-09-27 ENCOUNTER — OFFICE VISIT (OUTPATIENT)
Dept: ENDOCRINOLOGY | Facility: CLINIC | Age: 78
End: 2023-09-27
Payer: MEDICARE

## 2023-09-27 VITALS
WEIGHT: 204 LBS | RESPIRATION RATE: 20 BRPM | SYSTOLIC BLOOD PRESSURE: 144 MMHG | OXYGEN SATURATION: 100 % | DIASTOLIC BLOOD PRESSURE: 67 MMHG | BODY MASS INDEX: 39.84 KG/M2 | HEART RATE: 70 BPM

## 2023-09-27 DIAGNOSIS — C73 PAPILLARY CARCINOMA OF THYROID (H): ICD-10-CM

## 2023-09-27 DIAGNOSIS — Z13.6 CARDIOVASCULAR SCREENING; LDL GOAL LESS THAN 130: ICD-10-CM

## 2023-09-27 DIAGNOSIS — E11.9 TYPE 2 DIABETES, HBA1C GOAL < 7% (H): Primary | ICD-10-CM

## 2023-09-27 DIAGNOSIS — E78.2 MIXED HYPERLIPIDEMIA: ICD-10-CM

## 2023-09-27 DIAGNOSIS — C73 MALIGNANT NEOPLASM OF THYROID GLAND (H): ICD-10-CM

## 2023-09-27 PROCEDURE — 99214 OFFICE O/P EST MOD 30 MIN: CPT | Performed by: INTERNAL MEDICINE

## 2023-09-27 RX ORDER — INSULIN ASPART 100 [IU]/ML
10 INJECTION, SOLUTION INTRAVENOUS; SUBCUTANEOUS 2 TIMES DAILY WITH MEALS
Qty: 15 ML | Refills: 3 | Status: SHIPPED | OUTPATIENT
Start: 2023-09-27 | End: 2023-12-11

## 2023-09-27 RX ORDER — LEVOTHYROXINE SODIUM 112 MCG
TABLET ORAL
Qty: 90 TABLET | Refills: 3 | Status: SHIPPED | OUTPATIENT
Start: 2023-09-27 | End: 2023-11-01

## 2023-09-27 RX ORDER — FLURBIPROFEN SODIUM 0.3 MG/ML
SOLUTION/ DROPS OPHTHALMIC
Qty: 90 EACH | Refills: 3 | Status: SHIPPED | OUTPATIENT
Start: 2023-09-27 | End: 2024-07-03

## 2023-09-27 RX ORDER — EZETIMIBE 10 MG/1
10 TABLET ORAL DAILY
Qty: 90 TABLET | Refills: 3 | Status: SHIPPED | OUTPATIENT
Start: 2023-09-27 | End: 2024-05-14

## 2023-09-27 RX ORDER — BLOOD-GLUCOSE SENSOR
1 EACH MISCELLANEOUS
Qty: 6 EACH | Refills: 3 | Status: SHIPPED | OUTPATIENT
Start: 2023-09-27 | End: 2024-02-29

## 2023-09-27 RX ORDER — FENOFIBRATE 145 MG/1
145 TABLET, COATED ORAL DAILY
Qty: 90 TABLET | Refills: 3 | Status: SHIPPED | OUTPATIENT
Start: 2023-09-27 | End: 2024-05-09

## 2023-09-27 NOTE — LETTER
9/27/2023         RE: Chasity Hodgson  [id number]  [facility] [address]        Dear Colleague,    Thank you for referring your patient, Chasity Hodgson, to the [facility] [place]. Please see a copy of my visit note below.    Chasity Hodgson is a 77 year old year old female here for evaluation of Diabetes via a billable video visit.SHe also has PMHx of Diabetes Mellitus, and PTC/post surgical hypothyrodisim. She also has past medical istory of ANNETTE, gastroparesis, obesity,  Last seen by me June 2023    INTERVAL HISTORY:  - Trouble paying for tresiba, switched to basaglar  - No trouble with urination since starting Jardiance  - No adjustment of insulin since last visit, but taking reliably  - Some issues with CGM reading low, has not validated with glucometer    1) Diabetes Mellitus    Diabetes History:  Diagnosis: 2015, picked up on routine labs  Hospitalizations: None  Previous Regimens: Farxia (difficulty with frequent urination), Toujeo (was working well but insurance formulary changed 1/1/2021) At highest was at 48u daily, and then had profound low. At time of discontinuing, was down to 5u of toujeo. Previously on metformin. Glipizide, Ozepmic (worsening gastroparesis), Sitagliptin (swelling in leg)  Current Regimen: Jardiance 10mg daily, basaglar 25u daily, if under 150 when goes to dose, will take only 12, Novolog 5u with meals     BG check- Freestyle Libre3  Trends-   Overall high-- ave higher than previous, with less time in range              Complications: Gastroparesis- previously on reglan, overall not impactful    Last eye exam: Aug 2023- no retinopathy, has cataracts,   Foot Exam: [facility], checks every 60 days   Blood pressure- Lisinopril 40mg daily, Atenolol 50mg daily  Lipids- ezetimibe 10 mg daily, fenofibrate 145mg daily  SIOBHAN last 7/7- 25.52    2) Hypothyroidism s/p thyroidectomy for PTC  Diagnosis: known 3 nodules that were being monitored, and in 2002 s/p  thyroidectomy (nodule in isthmus was cancer), s/p BUTLER (per patient, unclear if clean margins so received BUTLER, unknown dose)  Treatment: Levothyroxine 112mcg 6 days a week, 1/2 tablet friday  Residual tissue on R that has been monitored, not growing.   Last ultrasound 10/2021- no tissue seen  last biomarkers 10/7/2021- TG <0.1, TGAb <0.4   Monitoring every other year     3) Vitamin D Deficiency  - Taking 2 tab daily  - PTH 9 (9/16/2021)--> recheck 21 10/21/2022  - Last Vit D 62    4) Hypertriglyceridemia  - On ezetimibe and fenofibrate, tried statin before with myalgias  - Eats minimal processed foods, minimal fried foods/baked treats      BP Readings from Last 3 Encounters:   09/27/23 (!) 144/67   09/18/23 (!) 149/55   09/01/23 (!) 176/72       Lab Results   Component Value Date    A1C 9.6 01/11/2022    A1C 9.5 10/07/2021    A1C 9.0 08/02/2021    A1C 8.7 07/07/2021    A1C 6.2 11/27/2020    A1C 6.8 09/10/2019       Recent Labs   Lab Test 01/11/22  1359 03/29/19  1355 08/01/17  0000 02/15/17  0000 02/11/16  0000 03/31/15  1327   CHOL 177 196   < > 142   < > 158   HDL 34* 27*   < > 31   < > 34*   LDL 81 99   < > 49   < > 51   TRIG 308* 349*   < > 309   < > 365*   CHOLHDLRATIO  --   --   --  4.6  --  4.6    < > = values in this interval not displayed.       Lab Results   Component Value Date    MICROL 36 01/11/2022    MICROL 23 07/07/2021     No results found for: MICROALBUMIN        Wt Readings from Last 3 Encounters:   09/27/23 92.5 kg (204 lb)   09/18/23 91.2 kg (201 lb)   08/31/23 92.9 kg (204 lb 14.4 oz)       Current Outpatient Medications   Medication Sig Dispense Refill     acetaminophen (TYLENOL) 325 MG tablet Take 2 tablets (650 mg) by mouth every 6 hours as needed for mild pain or other (and adjunct with moderate or severe pain or per patient request)       acetaminophen (TYLENOL) 500 MG tablet Take 1,000 mg by mouth every 8 hours as needed       acetaminophen-caffeine (EXCEDRIN TENSION HEADACHE) 500-65 MG  TABS Take 2 tablets by mouth every 6 hours as needed for mild pain 90 tablet 0     amoxicillin (AMOXIL) 500 MG capsule Takes 4 caps before every dental appointments.       aspirin (ASA) 81 MG EC tablet Take 81 mg by mouth daily       azelastine (ASTEPRO) 0.15 % nasal spray Spray 1 spray into both nostrils daily as needed       carvedilol (COREG) 12.5 MG tablet Take 1 tablet (12.5 mg) by mouth 2 times daily (with meals) 180 tablet 3     chlorthalidone (HYGROTON) 25 MG tablet Take 0.5 tablets (12.5 mg) by mouth every other day 45 tablet 3     clotrimazole (LOTRIMIN) 1 % cream Apply topically as needed       Continuous Blood Gluc Sensor (FREESTYLE BRANDY 3 SENSOR) MISC 1 each every 14 days 6 each 3     empagliflozin (JARDIANCE) 10 MG TABS tablet Take 1 tablet (10 mg) by mouth daily 90 tablet 1     ezetimibe (ZETIA) 10 MG tablet Take 1 tablet (10 mg) by mouth daily 90 tablet 3     famotidine (PEPCID) 20 MG tablet Take 2 tablets (40 mg) by mouth daily       fenofibrate (TRICOR) 145 MG tablet Take 1 tablet (145 mg) by mouth daily 90 tablet 3     fexofenadine (ALLEGRA) 180 MG tablet Take 180 mg by mouth daily. 120 0     fluocinolone acetonide (DERMA SMOOTHE/FS BODY) 0.01 % external oil Apply 2 mL to scalp once per week. Massage into scalp. Can be left in overnight or washed out after 4-6 hours. 118.28 mL 5     fluticasone (FLONASE) 50 MCG/ACT spray Spray 2 sprays in nostril daily 2 sprays in each nostril qd 1 Bottle 0     furosemide (LASIX) 20 MG tablet Take 1 tablet (20 mg) by mouth daily as needed (lower extremity edema) 90 tablet 3     Icosapent Ethyl 1 g CAPS Take 1 g by mouth 2 times daily 60 capsule 11     insulin aspart (NOVOLOG FLEXPEN) 100 UNIT/ML pen        insulin aspart (NOVOLOG FLEXPEN) 100 UNIT/ML pen Inject 5 Units Subcutaneous 2 times daily (with meals) Breakfast and supper, pt eats minimal at lunch       insulin glargine (LANTUS VIAL) 100 UNIT/ML vial Inject 25 Units Subcutaneous At Bedtime If  BG<150-->takes 12 units (50% of dose)       insulin pen needle (B-D U/F) 31G X 5 MM miscellaneous Use 1 pen needles daily or as directed. 90 each 3     Lidocaine (LIDOCARE) 4 % Patch Place 2 patches onto the skin every 24 hours To prevent lidocaine toxicity, patient should be patch free for 12 hrs daily.  0     MULTIPLE VITAMIN PO Take 1 tablet by mouth       nitroGLYcerin (NITROSTAT) 0.4 MG sublingual tablet Place 1 tablet (0.4 mg) under the tongue every 5 minutes as needed for chest pain (no more than 3 in one hour; after 3rd, call 911.) 25 tablet 3     nystatin (MYCOSTATIN) cream Apply topically daily as needed        nystatin-triamcinolone (MYCOLOG II) cream Apply topically daily as needed        olmesartan (BENICAR) 40 MG tablet Take 1 tablet (40 mg) by mouth daily 30 tablet 3     ondansetron (ZOFRAN) 4 MG tablet Take 1 tablet (4 mg) by mouth every 6 hours as needed Reported on 3/20/2017 20 tablet 0     order for DME Equipment being ordered: Compression stockings - Knee High; 20-30 mmHg compression - note would like adhesive band to keep the stocking from sliding down 3 each 0     pantoprazole (PROTONIX) 40 MG EC tablet Take 40 mg by mouth 2 times daily       Polyethylene Glycol 400 (BLINK TEARS) 0.25 % GEL Apply to eye At Bedtime       polyethylene glycol 400 (BLINK TEARS) 0.25 % SOLN ophthalmic solution Place 1 drop into both eyes daily       sucralfate (CARAFATE) 1 GM/10ML suspension Take 1 g by mouth 4 times daily as needed       SYNTHROID 112 MCG tablet TAKE ONE-HALF TABLET BY MOUTH DAILY ON FRIDAYS AND TAKE ONE TABLET BY MOUTH ALL OTHER DAYS AS DIRECTED 90 tablet 3     tretinoin (RETIN-A) 0.025 % external cream Use every night as tolerated - spot treat lesion 20 g 3     triamcinolone (KENALOG) 0.1 % external ointment Apply topically every other day 80 g 3     Vitamin D3 50 mcg (2000 units) tablet 1 tablet daily         Histories reviewed and updated in Epic.  Past Medical History:   Diagnosis Date      Abdominal adhesions 1984, 96,99    s/p lysis     Abdominal adhesions      Acne rosacea      Allergic rhinitis      Allergic rhinitis, cause unspecified      Alopecia      Anemia      CAD (coronary artery disease)      Carotid stenosis      CKD (chronic kidney disease) stage 2, GFR 60-89 ml/min      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)      CPAP (continuous positive airway pressure) dependence      Depression      Diabetic gastroparesis (H)      Diet-controlled type 2 diabetes mellitus (H)      DM2 (diabetes mellitus, type 2) (H)      DVT (deep venous thrombosis) (H)      DVT of axillary vein, acute right (H)      Fibromyalgia      Gastro-oesophageal reflux disease      GERD (gastroesophageal reflux disease)      Hernia of unspecified site of abdominal cavity without mention of obstruction or gangrene     bilateral     Hernia, abdominal      History of blood transfusion     10/ 1980     History of thrombophlebitis      HTN (hypertension)      HTN (hypertension)      Hypertriglyceridemia      Hypertriglyceridemia      Hypokalemia      Hypothyroidism      Mumps      Obstructive sleep apnea      ANNETTE (obstructive sleep apnea)      ANNETTE on CPAP      Osteoarthritis of glenohumeral joint      Papillary carcinoma of thyroid (H)     s/p thyroidectomy - Ruegemer     Papillary carcinoma of thyroid (H)      PE (pulmonary embolism)      Poliomyelitis     poor circulation right leg     Poliomyelitis      Postsurgical hypothyroidism     s/p papillary thryoid cancer - Ruegemer     Pulmonary embolism (H)      Pulmonary embolus (H)      Pulmonary nodule      Rosacea      S/P carpal tunnel release     bilateral     S/P hardware removal 01/2014    still with lingering foot pain     S/P shoulder surgery     bilateral     Septic joint (H)     right knee     Septic joint of right knee joint (H)      Venous insufficiency      Venous insufficiency      Venous thrombosis 1999    right axillary vein       Past Surgical History:   Procedure Laterality Date      AMPUTATE TOE(S)  03/15/2012    Procedure:AMPUTATE TOE(S); Surgeon:RUBEN MOSES; Location: OR     AMPUTATE TOE(S)       APPENDECTOMY  1972     APPENDECTOMY       ARTHRODESIS FOOT  03/15/2012    Procedure:ARTHRODESIS FOOT; RIGHT TRIPLE ARTHRODESIS, FIFTH TOE AMPUTATION, LATERAL LIGAMENT RECONSTRUCTION, TENDON TRANSFER AND RELEASE [MINI C-ARM, ARTHREX 4.5 AND 6.7 STAPLES, BIOCOMPOSITE TENODESIS SCREWS]; Surgeon:RUBEN MOSES; Location: OR     ARTHRODESIS FOOT Right     Right foot triple arthrodesis and removal of hardware     ARTHROSCOPY SHOULDER  06/25/2015    REVISION SUBACROMIAL DECOMPRESSION, EXCISION OF GANGLION CYST, DEBRIDEMENT AND EXCISION OF THE GLENOHUMERAL JOINT GANGLION CYST, CORACOID DECOMPRESSION, POSSIBLE SUBSCAPULARIS REPAIR AND OPEN SUBSCAPULARIS BICEP     ARTHROSCOPY SHOULDER ROTATOR CUFF REPAIR       BIOPSY  Thyroid 2002     BLADDER SURGERY       BREAST SURGERY  Biopsy     CHOLECYSTECTOMY       CHOLECYSTECTOMY       COLONOSCOPY  2018     COLOSTOMY  02/07/2012    Procedure:COLOSTOMY; CREATION OF SIGMOID COLOSTOMY AND EXTENSIVE  LYSIS OF ADHESIONS; Surgeon:MONTSERRAT BENDER; Location: OR     COLOSTOMY       CYSTOSCOPY       CYSTOSCOPY, INJECT BOTOX N/A 9/18/2023    Procedure: CYSTOSCOPY, WITH BOTULINUM TOXIN INJECTION;  Surgeon: Mishel Zendejas MD;  [place] [facility]     EYE SURGERY       GENITOURINARY SURGERY  1999     GI SURGERY      weakened rectal sphincter with artificial stimulator     HERNIA REPAIR  1976     HYSTERECTOMY TOTAL ABDOMINAL       LAPAROTOMY, LYSIS ADHESIONS, COMBINED  02/07/2012    Procedure:COMBINED LAPAROTOMY, LYSIS ADHESIONS; Surgeon:MONTSERRAT BENDER; Location: OR     RELEASE CARPAL TUNNEL       RELEASE TENDON FOOT  03/15/2012    Procedure:RELEASE TENDON FOOT; Surgeon:RUBEN MOSES; Location: OR     REMOVE HARDWARE FOOT  12/13/2012    Procedure: REMOVE HARDWARE FOOT;  RIGHT FOOT REMOVAL OF HARDWARE;  Surgeon: Lashay  Sukhdeep Coyle MD;  Location: [facility] OR     SHOULDER SURGERY  11/12/2020    LEFT SHOULDER HEMIARHTROPLASTY, BICEP TENODESIS     SOFT TISSUE SURGERY  2018, 2020     TENDON RELEASE      foot     THYROIDECTOMY       ZC FREEING BOWEL ADHESION,ENTEROLYSIS      1986, 1996, 1999     ZZC NONSPECIFIC PROCEDURE      throidectomy     ZZC TOTAL ABDOM HYSTERECTOMY  1980    + BSO       Allergies:  Ketorolac tromethamine, Nsaids, Celecoxib, Morphine and related, Crestor [rosuvastatin], No clinical screening - see comments, Vioxx, Conjugated estrogens, and Sulfa antibiotics    Social History     Tobacco Use     Smoking status: Never     Smokeless tobacco: Never   Substance Use Topics     Alcohol use: Never       Family History   Problem Relation Age of Onset     Arthritis Mother      Hypertension Mother      Cerebrovascular Disease Mother      Obesity Mother      Heart Disease Mother         MI's     Hypertension Father      Respiratory Father         Adult RDS     Diabetes Father      Arthritis Sister      Cancer Sister      Diabetes Sister      Hypertension Sister      Breast Cancer Sister      Depression Sister      Thyroid Disease Sister      Obesity Sister      Arthritis Sister      Hypertension Sister      Thyroid Disease Sister      Osteoporosis Sister      Obesity Sister      Cancer Sister         colon polup     Hypertension Sister      Osteoporosis Sister      Obesity Sister      Colon Cancer Sister      Lipids Sister      Obesity Sister      Obesity Maternal Grandmother         Dad s mother     Skin Cancer Maternal Grandmother         skin cancer unknown     Cancer Maternal Grandmother         unknown skin cancer on face     Obesity Paternal Grandmother         Mothers mother     Heart Disease Brother         MI at 54     Other Cancer Brother         Lung & bone     Lipids Brother      Hypertension Brother      Diabetes Brother      Hyperlipidemia Brother      Ovarian Cancer No family hx of           REVIEW OF SYSTEMS:    ROS: 10 point ROS neg other than the symptoms noted above in the HPI.      EXAM:  Vitals: BP (!) 144/67   Pulse 70   Resp 20   Wt 92.5 kg (204 lb)   SpO2 100%   BMI 39.84 kg/m      BMIE= Body mass index is 39.84 kg/m .  Exam:  Constitutional: healthy, alert, no acute distress  Head: Normocephalic. No masses, lesions, no exophthalmos/proptosis  ENT: no palpable thyroid, no cervical lymph nodes  Respiratory: nonlabored  Gastrointestinal: Abdomen soft, non-tender.  Musculoskeletal: extremities normal- no gross deformities noted, gait normal and normal muscle tone  Skin: no suspicious lesions or rashes  Neurologic: Gait normal. sensation grossly intact  Psychiatric: mentation appears normal, calm    Neck Ultrasound 8/2022:  FINDINGS: Postoperative changes of thyroidectomy are noted. No  evidence for a recurrent soft tissue lesion in the thyroidectomy bed.  No worrisome lesions are identified in the neck. Ultrasound scanning  through the region of the left neck palpable abnormality demonstrates  a normal-appearing left submandibular gland. Incidentally noted  atherosclerotic plaque is noted in the left internal carotid artery.                                                                    IMPRESSION:    1. No convincing evidence for recurrent malignancy in the neck.   2. The left neck palpable abnormality appears to correspond to a  normal left submandibular gland. If there is continued clinical  concern for a suspicious neck mass, consider contrast-enhanced CT or  MRI of the neck for further evaluation.        ASSESSMENT/PLAN:  No diagnosis found.  No orders of the defined types were placed in this encounter.      1) Diabetes Mellitus   - Glucose Control- NOT at goal,   - Basaglar increase 20% to 30u every morning   - Continue  Jardiance 10mg daily   - INCREASE Novolog 10u with each meal (eats twice daily)  - Cardiovascular Risk- continue ezetimibe, fenofibrate,   - Ophthalmology- uptodate, instructed to return  Aug 2024, no NPDR.  - Podiatry- patient instructed on routine foot care, follows with podiatry  - Renal- Uptodate,  continue lisinopril, recheck SIOBHAN now    2) Cardiovascular Risk-   - Continue ezetimibe, fenofibrate.   - Continue icosapent ethyl 1g BID  - Continue aspirin 81mg daily     3) Hypothyroidism, postsurgical  - TSH/FT4 at goal  - Continue LT4 112mcg     4) History of Papillary Thyroid Cancer  - U/S and TG/TGab show biochemically and structurally complete response  - Now 20 years out  - enlarged LN, - no recurrence on ultrasound  - Recheck TG level now (last 2021, plan for every other year), last ultrasound 2022    RTC- 3 months as scheduled    A total of 31 minutes were spent today 09/27/23 on this visit including chart review, history and counseling, documentation and other activities as detailed above.     Answers submitted by the patient for this visit:  Symptoms you have experienced in the last 30 days (Submitted on 9/20/2023)  General Symptoms: Yes  Skin Symptoms: Yes  HENT Symptoms: Yes  EYE SYMPTOMS: Yes  HEART SYMPTOMS: Yes  LUNG SYMPTOMS: Yes  INTESTINAL SYMPTOMS: Yes  URINARY SYMPTOMS: Yes  GYNECOLOGIC SYMPTOMS: No  BREAST SYMPTOMS: Yes  SKELETAL SYMPTOMS: Yes  BLOOD SYMPTOMS: Yes  NERVOUS SYSTEM SYMPTOMS: Yes  MENTAL HEALTH SYMPTOMS: Yes  Please answer the questions below to tell us what conditions you are experiencing: (Submitted on 9/20/2023)  Ear pain: Yes  Ear discharge: No  Hearing loss: No  Tinnitus: No  Nosebleeds: No  Congestion: No  Sinus pain: No  Trouble swallowing: Yes   Voice hoarseness: Yes  Mouth sores: Yes  Sore throat: Yes  Tooth pain: Yes  Gum tenderness: Yes  Bleeding gums: No  Change in taste: No  Change in sense of smell: No  Dry mouth: No  Hearing aid used: No  Neck lump: Yes  Please answer the questions below to tell us what conditions you are experiencing: (Submitted on 9/20/2023)  Loss of appetite: No  Weight loss: No  Weight gain: No  Fatigue: Yes  Night sweats:  Yes  Chills: Yes  Increased stress: Yes  Excessive hunger: No  Excessive thirst: No  Feeling hot or cold when others believe the temperature is normal: Yes  Loss of height: No  Post-operative complications: No  Surgical site pain: No  Hallucinations: No  Change in or Loss of Energy: Yes  Hyperactivity: No  Confusion: No   (Submitted on 9/20/2023)  Changes in hair: Yes  Changes in moles/birth marks: No  Itching: Yes  Rashes: Yes  Changes in nails: Yes  Acne: No  Hair in places you don't want it: No  Change in facial hair: Yes  Warts: No  Non-healing sores: No  Scarring: No  Flaking of skin: Yes  Color changes of hands/feet in cold : No  Sun sensitivity: Yes  Skin thickening: No  Please answer the questions below to tell us what conditions you are experiencing: (Submitted on 9/20/2023)  Eye pain: Yes  Vision loss: No  Dry eyes: Yes  Watery eyes: No  Eye bulging: No  Double vision: No  Flashing of lights: Yes  Spots: Yes  Floaters: Yes  Redness: No  Crossed eyes: No  Tunnel Vision: No  Yellowing of eyes: No  Eye irritation: Yes  Please answer the questions below to tell us what condition you are experiencing: (Submitted on 9/20/2023)  Chest pain or pressure: No  Fast or irregular heartbeat: Yes  Pain in legs with walking: Yes  Trouble breathing while lying down: Yes  High blood pressure: Yes  Low blood pressure: Yes  Fainting: No  Murmurs: No  Pacemaker: No  Varicose veins: No  Edema or swelling: Yes  Wake up at night with shortness of breath: No  Light-headedness: Yes  Exercise intolerance: No  Please answer the questions below to tell us what condition you are experiencing: (Submitted on 9/20/2023)  Cough: No  Sputum or phlegm: No  Coughing up blood: No  Difficulty breating or shortness of breath: Yes  Snoring: Yes  Wheezing: No  Difficulty breathing on exertion: No  Nighttime Cough: No  Difficulty breathing when lying flat: Yes  Please answer the questions below to tell us what conditions you are experiencing:  (Submitted on 9/20/2023)  Heart burn or indigestion: Yes  Nausea: Yes  Vomiting: No  Abdominal pain: Yes  Bloating: No  Constipation: No  Diarrhea: Yes  Blood in stool: No  Black stools: No  Rectal or Anal pain: Yes  Fecal incontinence: No  Yellowing of skin or eyes: No  Vomit with blood: No  Change in stools: No  Please answer the questions below to tell us what condition you are experiencing: (Submitted on 9/20/2023)  Trouble holding urine or incontinence: Yes  Pain or burning: No  Trouble starting or stopping: Yes  Increased frequency of urination: No  Blood in urine: No  Decreased frequency of urination: No  Frequent nighttime urination: No  Flank pain: No  Difficulty emptying bladder: No  Please answer the questions below to tell us what condition you are experiencing: (Submitted on 9/20/2023)  Discharge: No  Lumps: No  Pain: Yes  Nipple retraction: No  Please answer the questions below to tell us what condition you are experiencing: (Submitted on 9/20/2023)  Back pain: Yes  Muscle aches: Yes  Neck pain: Yes  Swollen joints: No  Joint pain: Yes  Bone pain: No  Muscle cramps: No  Muscle weakness: Yes  Joint stiffness: No  Bone fracture: No  Please answer the questions below to tell us what condition you are experiencing: (Submitted on 9/20/2023)  Edema or swelling: Yes  Anemia: No  Swollen glands: Yes  Easy bleeding or bruising: No  Please answer the questions below to tell us what condition you are experiencing: (Submitted on 9/20/2023)  Trouble with coordination: No  Dizziness or trouble with balance: Yes  Fainting or black-out spells: No  Memory loss: No  Headache: Yes  Seizures: No  Speech problems: No  Tingling: No  Tremor: No  Weakness: Yes  Difficulty walking: Yes  Paralysis: No  Numbness: No  Please answer the questions below to tell us what condition you are experiencing: (Submitted on 9/20/2023)  Nervous or Anxious: Yes  Depression: Yes  Trouble sleeping: No  Trouble thinking or concentrating: No  Mood  changes: No  Panic attacks: No      Again, thank you for allowing me to participate in the care of your patient.        Sincerely,        Odette Weston MD

## 2023-09-27 NOTE — PROGRESS NOTES
Chasity Hodgson is a 77 year old year old female here for evaluation of Diabetes via a billable video visit.SHe also has PMHx of Diabetes Mellitus, and PTC/post surgical hypothyrodisim. She also has past medical istory of ANNETTE, gastroparesis, obesity,  Last seen by me June 2023    INTERVAL HISTORY:  - Trouble paying for tresiba, switched to basaglar  - No trouble with urination since starting Jardiance  - No adjustment of insulin since last visit, but taking reliably  - Some issues with CGM reading low, has not validated with glucometer    1) Diabetes Mellitus    Diabetes History:  Diagnosis: 2015, picked up on routine labs  Hospitalizations: None  Previous Regimens: Farxia (difficulty with frequent urination), Toujeo (was working well but insurance formulary changed 1/1/2021) At highest was at 48u daily, and then had profound low. At time of discontinuing, was down to 5u of toujeo. Previously on metformin. Glipizide, Ozepmic (worsening gastroparesis), Sitagliptin (swelling in leg)  Current Regimen: Jardiance 10mg daily, basaglar 25u daily, if under 150 when goes to dose, will take only 12, Novolog 5u with meals     BG check- Freestyle Libre3  Trends-   Overall high-- ave higher than previous, with less time in range              Complications: Gastroparesis- previously on reglan, overall not impactful    Last eye exam: Aug 2023- no retinopathy, has cataracts,   Foot Exam: [facility], checks every 60 days   Blood pressure- Lisinopril 40mg daily, Atenolol 50mg daily  Lipids- ezetimibe 10 mg daily, fenofibrate 145mg daily  SIOBHAN last 7/7- 25.52    2) Hypothyroidism s/p thyroidectomy for PTC  Diagnosis: known 3 nodules that were being monitored, and in 2002 s/p thyroidectomy (nodule in isthmus was cancer), s/p BUTLER (per patient, unclear if clean margins so received BUTLER, unknown dose)  Treatment: Levothyroxine 112mcg 6 days a week, 1/2 tablet friday  Residual tissue on R that has been monitored, not growing.   Last  ultrasound 10/2021- no tissue seen  last biomarkers 10/7/2021- TG <0.1, TGAb <0.4   Monitoring every other year     3) Vitamin D Deficiency  - Taking 2 tab daily  - PTH 9 (9/16/2021)--> recheck 21 10/21/2022  - Last Vit D 62    4) Hypertriglyceridemia  - On ezetimibe and fenofibrate, tried statin before with myalgias  - Eats minimal processed foods, minimal fried foods/baked treats      BP Readings from Last 3 Encounters:   09/27/23 (!) 144/67   09/18/23 (!) 149/55   09/01/23 (!) 176/72       Lab Results   Component Value Date    A1C 9.6 01/11/2022    A1C 9.5 10/07/2021    A1C 9.0 08/02/2021    A1C 8.7 07/07/2021    A1C 6.2 11/27/2020    A1C 6.8 09/10/2019       Recent Labs   Lab Test 01/11/22  1359 03/29/19  1355 08/01/17  0000 02/15/17  0000 02/11/16  0000 03/31/15  1327   CHOL 177 196   < > 142   < > 158   HDL 34* 27*   < > 31   < > 34*   LDL 81 99   < > 49   < > 51   TRIG 308* 349*   < > 309   < > 365*   CHOLHDLRATIO  --   --   --  4.6  --  4.6    < > = values in this interval not displayed.       Lab Results   Component Value Date    MICROL 36 01/11/2022    MICROL 23 07/07/2021     No results found for: MICROALBUMIN        Wt Readings from Last 3 Encounters:   09/27/23 92.5 kg (204 lb)   09/18/23 91.2 kg (201 lb)   08/31/23 92.9 kg (204 lb 14.4 oz)       Current Outpatient Medications   Medication Sig Dispense Refill    acetaminophen (TYLENOL) 325 MG tablet Take 2 tablets (650 mg) by mouth every 6 hours as needed for mild pain or other (and adjunct with moderate or severe pain or per patient request)      acetaminophen (TYLENOL) 500 MG tablet Take 1,000 mg by mouth every 8 hours as needed      acetaminophen-caffeine (EXCEDRIN TENSION HEADACHE) 500-65 MG TABS Take 2 tablets by mouth every 6 hours as needed for mild pain 90 tablet 0    amoxicillin (AMOXIL) 500 MG capsule Takes 4 caps before every dental appointments.      aspirin (ASA) 81 MG EC tablet Take 81 mg by mouth daily      azelastine (ASTEPRO) 0.15 %  nasal spray Spray 1 spray into both nostrils daily as needed      carvedilol (COREG) 12.5 MG tablet Take 1 tablet (12.5 mg) by mouth 2 times daily (with meals) 180 tablet 3    chlorthalidone (HYGROTON) 25 MG tablet Take 0.5 tablets (12.5 mg) by mouth every other day 45 tablet 3    clotrimazole (LOTRIMIN) 1 % cream Apply topically as needed      Continuous Blood Gluc Sensor (FREESTYLE BRANDY 3 SENSOR) MISC 1 each every 14 days 6 each 3    empagliflozin (JARDIANCE) 10 MG TABS tablet Take 1 tablet (10 mg) by mouth daily 90 tablet 1    ezetimibe (ZETIA) 10 MG tablet Take 1 tablet (10 mg) by mouth daily 90 tablet 3    famotidine (PEPCID) 20 MG tablet Take 2 tablets (40 mg) by mouth daily      fenofibrate (TRICOR) 145 MG tablet Take 1 tablet (145 mg) by mouth daily 90 tablet 3    fexofenadine (ALLEGRA) 180 MG tablet Take 180 mg by mouth daily. 120 0    fluocinolone acetonide (DERMA SMOOTHE/FS BODY) 0.01 % external oil Apply 2 mL to scalp once per week. Massage into scalp. Can be left in overnight or washed out after 4-6 hours. 118.28 mL 5    fluticasone (FLONASE) 50 MCG/ACT spray Spray 2 sprays in nostril daily 2 sprays in each nostril qd 1 Bottle 0    furosemide (LASIX) 20 MG tablet Take 1 tablet (20 mg) by mouth daily as needed (lower extremity edema) 90 tablet 3    Icosapent Ethyl 1 g CAPS Take 1 g by mouth 2 times daily 60 capsule 11    insulin aspart (NOVOLOG FLEXPEN) 100 UNIT/ML pen       insulin aspart (NOVOLOG FLEXPEN) 100 UNIT/ML pen Inject 5 Units Subcutaneous 2 times daily (with meals) Breakfast and supper, pt eats minimal at lunch      insulin glargine (LANTUS VIAL) 100 UNIT/ML vial Inject 25 Units Subcutaneous At Bedtime If BG<150-->takes 12 units (50% of dose)      insulin pen needle (B-D U/F) 31G X 5 MM miscellaneous Use 1 pen needles daily or as directed. 90 each 3    Lidocaine (LIDOCARE) 4 % Patch Place 2 patches onto the skin every 24 hours To prevent lidocaine toxicity, patient should be patch free for  12 hrs daily.  0    MULTIPLE VITAMIN PO Take 1 tablet by mouth      nitroGLYcerin (NITROSTAT) 0.4 MG sublingual tablet Place 1 tablet (0.4 mg) under the tongue every 5 minutes as needed for chest pain (no more than 3 in one hour; after 3rd, call 911.) 25 tablet 3    nystatin (MYCOSTATIN) cream Apply topically daily as needed       nystatin-triamcinolone (MYCOLOG II) cream Apply topically daily as needed       olmesartan (BENICAR) 40 MG tablet Take 1 tablet (40 mg) by mouth daily 30 tablet 3    ondansetron (ZOFRAN) 4 MG tablet Take 1 tablet (4 mg) by mouth every 6 hours as needed Reported on 3/20/2017 20 tablet 0    order for DME Equipment being ordered: Compression stockings - Knee High; 20-30 mmHg compression - note would like adhesive band to keep the stocking from sliding down 3 each 0    pantoprazole (PROTONIX) 40 MG EC tablet Take 40 mg by mouth 2 times daily      Polyethylene Glycol 400 (BLINK TEARS) 0.25 % GEL Apply to eye At Bedtime      polyethylene glycol 400 (BLINK TEARS) 0.25 % SOLN ophthalmic solution Place 1 drop into both eyes daily      sucralfate (CARAFATE) 1 GM/10ML suspension Take 1 g by mouth 4 times daily as needed      SYNTHROID 112 MCG tablet TAKE ONE-HALF TABLET BY MOUTH DAILY ON FRIDAYS AND TAKE ONE TABLET BY MOUTH ALL OTHER DAYS AS DIRECTED 90 tablet 3    tretinoin (RETIN-A) 0.025 % external cream Use every night as tolerated - spot treat lesion 20 g 3    triamcinolone (KENALOG) 0.1 % external ointment Apply topically every other day 80 g 3    Vitamin D3 50 mcg (2000 units) tablet 1 tablet daily         Histories reviewed and updated in Epic.  Past Medical History:   Diagnosis Date    Abdominal adhesions 1984, 96,99    s/p lysis    Abdominal adhesions     Acne rosacea     Allergic rhinitis     Allergic rhinitis, cause unspecified     Alopecia     Anemia     CAD (coronary artery disease)     Carotid stenosis     CKD (chronic kidney disease) stage 2, GFR 60-89 ml/min     Colostomy in place  (H)     CPAP (continuous positive airway pressure) dependence     Depression     Diabetic gastroparesis (H)     Diet-controlled type 2 diabetes mellitus (H)     DM2 (diabetes mellitus, type 2) (H)     DVT (deep venous thrombosis) (H)     DVT of axillary vein, acute right (H)     Fibromyalgia     Gastro-oesophageal reflux disease     GERD (gastroesophageal reflux disease)     Hernia of unspecified site of abdominal cavity without mention of obstruction or gangrene     bilateral    Hernia, abdominal     History of blood transfusion     10/ 1980    History of thrombophlebitis     HTN (hypertension)     HTN (hypertension)     Hypertriglyceridemia     Hypertriglyceridemia     Hypokalemia     Hypothyroidism     Mumps     Obstructive sleep apnea     ANNETTE (obstructive sleep apnea)     ANNETTE on CPAP     Osteoarthritis of glenohumeral joint     Papillary carcinoma of thyroid (H)     s/p thyroidectomy - Ruegemer    Papillary carcinoma of thyroid (H)     PE (pulmonary embolism)     Poliomyelitis     poor circulation right leg    Poliomyelitis     Postsurgical hypothyroidism     s/p papillary thryoid cancer - Ruegemer    Pulmonary embolism (H)     Pulmonary embolus (H)     Pulmonary nodule     Rosacea     S/P carpal tunnel release     bilateral    S/P hardware removal 01/2014    still with lingering foot pain    S/P shoulder surgery     bilateral    Septic joint (H)     right knee    Septic joint of right knee joint (H)     Venous insufficiency     Venous insufficiency     Venous thrombosis 1999    right axillary vein       Past Surgical History:   Procedure Laterality Date    AMPUTATE TOE(S)  03/15/2012    Procedure:AMPUTATE TOE(S); Surgeon:RUBEN MOSES; Location:[facility] OR    AMPUTATE TOE(S)      APPENDECTOMY  1972    APPENDECTOMY      ARTHRODESIS FOOT  03/15/2012    Procedure:ARTHRODESIS FOOT; RIGHT TRIPLE ARTHRODESIS, FIFTH TOE AMPUTATION, LATERAL LIGAMENT RECONSTRUCTION, TENDON TRANSFER AND RELEASE [MINI C-ARM, ARTHREX  4.5 AND 6.7 FLIP, BIOCOMPOSITE TENODESIS SCREWS]; Surgeon:RUBEN METZ; Location: OR    ARTHRODESIS FOOT Right     Right foot triple arthrodesis and removal of hardware    ARTHROSCOPY SHOULDER  06/25/2015    REVISION SUBACROMIAL DECOMPRESSION, EXCISION OF GANGLION CYST, DEBRIDEMENT AND EXCISION OF THE GLENOHUMERAL JOINT GANGLION CYST, CORACOID DECOMPRESSION, POSSIBLE SUBSCAPULARIS REPAIR AND OPEN SUBSCAPULARIS BICEP    ARTHROSCOPY SHOULDER ROTATOR CUFF REPAIR      BIOPSY  Thyroid 2002    BLADDER SURGERY      BREAST SURGERY  Biopsy    CHOLECYSTECTOMY      CHOLECYSTECTOMY      COLONOSCOPY  2018    COLOSTOMY  02/07/2012    Procedure:COLOSTOMY; CREATION OF SIGMOID COLOSTOMY AND EXTENSIVE  LYSIS OF ADHESIONS; Surgeon:MONTSERRAT BENDER; Location: OR    COLOSTOMY      CYSTOSCOPY      CYSTOSCOPY, INJECT BOTOX N/A 9/18/2023    Procedure: CYSTOSCOPY, WITH BOTULINUM TOXIN INJECTION;  Surgeon: Mishel Zendejas MD;  [place] [facility] [place]YE SURGERY      GENITOURINARY SURGERY  1999    GI SURGERY      weakened rectal sphincter with artificial stimulator    HERNIA REPAIR  1976    HYSTERECTOMY TOTAL ABDOMINAL      LAPAROTOMY, LYSIS ADHESIONS, COMBINED  02/07/2012    Procedure:COMBINED LAPAROTOMY, LYSIS ADHESIONS; Surgeon:MONTSERRAT BENDER; Location: OR    RELEASE CARPAL TUNNEL      RELEASE TENDON FOOT  03/15/2012    Procedure:RELEASE TENDON FOOT; Surgeon:RUBEN METZ; Location: OR    REMOVE HARDWARE FOOT  12/13/2012    Procedure: REMOVE HARDWARE FOOT;  RIGHT FOOT REMOVAL OF HARDWARE;  Surgeon: Ruben Metz MD;  Location:  OR    SHOULDER SURGERY  11/12/2020    LEFT SHOULDER HEMIARHTROPLASTY, BICEP TENODESIS    SOFT TISSUE SURGERY  2018, 2020    TENDON RELEASE      foot    THYROIDECTOMY      [facility] FREEING BOWEL ADHESION,ENTEROLYSIS      1986, 1996, 1999    Z NONSPECIFIC PROCEDURE      throidectomy    ZZC TOTAL ABDOM HYSTERECTOMY  1980    + BSO       Allergies:  Ketorolac  tromethamine, Nsaids, Celecoxib, Morphine and related, Crestor [rosuvastatin], No clinical screening - see comments, Vioxx, Conjugated estrogens, and Sulfa antibiotics    Social History     Tobacco Use    Smoking status: Never    Smokeless tobacco: Never   Substance Use Topics    Alcohol use: Never       Family History   Problem Relation Age of Onset    Arthritis Mother     Hypertension Mother     Cerebrovascular Disease Mother     Obesity Mother     Heart Disease Mother         MI's    Hypertension Father     Respiratory Father         Adult RDS    Diabetes Father     Arthritis Sister     Cancer Sister     Diabetes Sister     Hypertension Sister     Breast Cancer Sister     Depression Sister     Thyroid Disease Sister     Obesity Sister     Arthritis Sister     Hypertension Sister     Thyroid Disease Sister     Osteoporosis Sister     Obesity Sister     Cancer Sister         colon polup    Hypertension Sister     Osteoporosis Sister     Obesity Sister     Colon Cancer Sister     Lipids Sister     Obesity Sister     Obesity Maternal Grandmother         Dad s mother    Skin Cancer Maternal Grandmother         skin cancer unknown    Cancer Maternal Grandmother         unknown skin cancer on face    Obesity Paternal Grandmother         Mothers mother    Heart Disease Brother         MI at 54    Other Cancer Brother         Lung & bone    Lipids Brother     Hypertension Brother     Diabetes Brother     Hyperlipidemia Brother     Ovarian Cancer No family hx of           REVIEW OF SYSTEMS:   ROS: 10 point ROS neg other than the symptoms noted above in the HPI.      EXAM:  Vitals: BP (!) 144/67   Pulse 70   Resp 20   Wt 92.5 kg (204 lb)   SpO2 100%   BMI 39.84 kg/m      BMIE= Body mass index is 39.84 kg/m .  Exam:  Constitutional: healthy, alert, no acute distress  Head: Normocephalic. No masses, lesions, no exophthalmos/proptosis  ENT: no palpable thyroid, no cervical lymph nodes  Respiratory:  nonlabored  Gastrointestinal: Abdomen soft, non-tender.  Musculoskeletal: extremities normal- no gross deformities noted, gait normal and normal muscle tone  Skin: no suspicious lesions or rashes  Neurologic: Gait normal. sensation grossly intact  Psychiatric: mentation appears normal, calm    Neck Ultrasound 8/2022:  FINDINGS: Postoperative changes of thyroidectomy are noted. No  evidence for a recurrent soft tissue lesion in the thyroidectomy bed.  No worrisome lesions are identified in the neck. Ultrasound scanning  through the region of the left neck palpable abnormality demonstrates  a normal-appearing left submandibular gland. Incidentally noted  atherosclerotic plaque is noted in the left internal carotid artery.                                                                    IMPRESSION:    1. No convincing evidence for recurrent malignancy in the neck.   2. The left neck palpable abnormality appears to correspond to a  normal left submandibular gland. If there is continued clinical  concern for a suspicious neck mass, consider contrast-enhanced CT or  MRI of the neck for further evaluation.        ASSESSMENT/PLAN:  No diagnosis found.  No orders of the defined types were placed in this encounter.      1) Diabetes Mellitus   - Glucose Control- NOT at goal,   - Basaglar increase 20% to 30u every morning   - Continue  Jardiance 10mg daily   - INCREASE Novolog 10u with each meal (eats twice daily)  - Cardiovascular Risk- continue ezetimibe, fenofibrate,   - Ophthalmology- uptodate, instructed to return Aug 2024, no NPDR.  - Podiatry- patient instructed on routine foot care, follows with podiatry  - Renal- Uptodate,  continue lisinopril, recheck SIOBHAN now    2) Cardiovascular Risk-   - Continue ezetimibe, fenofibrate.   - Continue icosapent ethyl 1g BID  - Continue aspirin 81mg daily     3) Hypothyroidism, postsurgical  - TSH/FT4 at goal  - Continue LT4 112mcg     4) History of Papillary Thyroid Cancer  - U/S  and TG/TGab show biochemically and structurally complete response  - Now 20 years out  - enlarged LN, - no recurrence on ultrasound  - Recheck TG level now (last 2021, plan for every other year), last ultrasound 2022    RTC- 3 months as scheduled    A total of 31 minutes were spent today 09/27/23 on this visit including chart review, history and counseling, documentation and other activities as detailed above.     Answers submitted by the patient for this visit:  Symptoms you have experienced in the last 30 days (Submitted on 9/20/2023)  General Symptoms: Yes  Skin Symptoms: Yes  HENT Symptoms: Yes  EYE SYMPTOMS: Yes  HEART SYMPTOMS: Yes  LUNG SYMPTOMS: Yes  INTESTINAL SYMPTOMS: Yes  URINARY SYMPTOMS: Yes  GYNECOLOGIC SYMPTOMS: No  BREAST SYMPTOMS: Yes  SKELETAL SYMPTOMS: Yes  BLOOD SYMPTOMS: Yes  NERVOUS SYSTEM SYMPTOMS: Yes  MENTAL HEALTH SYMPTOMS: Yes  Please answer the questions below to tell us what conditions you are experiencing: (Submitted on 9/20/2023)  Ear pain: Yes  Ear discharge: No  Hearing loss: No  Tinnitus: No  Nosebleeds: No  Congestion: No  Sinus pain: No  Trouble swallowing: Yes   Voice hoarseness: Yes  Mouth sores: Yes  Sore throat: Yes  Tooth pain: Yes  Gum tenderness: Yes  Bleeding gums: No  Change in taste: No  Change in sense of smell: No  Dry mouth: No  Hearing aid used: No  Neck lump: Yes  Please answer the questions below to tell us what conditions you are experiencing: (Submitted on 9/20/2023)  Loss of appetite: No  Weight loss: No  Weight gain: No  Fatigue: Yes  Night sweats: Yes  Chills: Yes  Increased stress: Yes  Excessive hunger: No  Excessive thirst: No  Feeling hot or cold when others believe the temperature is normal: Yes  Loss of height: No  Post-operative complications: No  Surgical site pain: No  Hallucinations: No  Change in or Loss of Energy: Yes  Hyperactivity: No  Confusion: No   (Submitted on 9/20/2023)  Changes in hair: Yes  Changes in moles/birth marks: No  Itching:  Yes  Rashes: Yes  Changes in nails: Yes  Acne: No  Hair in places you don't want it: No  Change in facial hair: Yes  Warts: No  Non-healing sores: No  Scarring: No  Flaking of skin: Yes  Color changes of hands/feet in cold : No  Sun sensitivity: Yes  Skin thickening: No  Please answer the questions below to tell us what conditions you are experiencing: (Submitted on 9/20/2023)  Eye pain: Yes  Vision loss: No  Dry eyes: Yes  Watery eyes: No  Eye bulging: No  Double vision: No  Flashing of lights: Yes  Spots: Yes  Floaters: Yes  Redness: No  Crossed eyes: No  Tunnel Vision: No  Yellowing of eyes: No  Eye irritation: Yes  Please answer the questions below to tell us what condition you are experiencing: (Submitted on 9/20/2023)  Chest pain or pressure: No  Fast or irregular heartbeat: Yes  Pain in legs with walking: Yes  Trouble breathing while lying down: Yes  High blood pressure: Yes  Low blood pressure: Yes  Fainting: No  Murmurs: No  Pacemaker: No  Varicose veins: No  Edema or swelling: Yes  Wake up at night with shortness of breath: No  Light-headedness: Yes  Exercise intolerance: No  Please answer the questions below to tell us what condition you are experiencing: (Submitted on 9/20/2023)  Cough: No  Sputum or phlegm: No  Coughing up blood: No  Difficulty breating or shortness of breath: Yes  Snoring: Yes  Wheezing: No  Difficulty breathing on exertion: No  Nighttime Cough: No  Difficulty breathing when lying flat: Yes  Please answer the questions below to tell us what conditions you are experiencing: (Submitted on 9/20/2023)  Heart burn or indigestion: Yes  Nausea: Yes  Vomiting: No  Abdominal pain: Yes  Bloating: No  Constipation: No  Diarrhea: Yes  Blood in stool: No  Black stools: No  Rectal or Anal pain: Yes  Fecal incontinence: No  Yellowing of skin or eyes: No  Vomit with blood: No  Change in stools: No  Please answer the questions below to tell us what condition you are experiencing: (Submitted on  9/20/2023)  Trouble holding urine or incontinence: Yes  Pain or burning: No  Trouble starting or stopping: Yes  Increased frequency of urination: No  Blood in urine: No  Decreased frequency of urination: No  Frequent nighttime urination: No  Flank pain: No  Difficulty emptying bladder: No  Please answer the questions below to tell us what condition you are experiencing: (Submitted on 9/20/2023)  Discharge: No  Lumps: No  Pain: Yes  Nipple retraction: No  Please answer the questions below to tell us what condition you are experiencing: (Submitted on 9/20/2023)  Back pain: Yes  Muscle aches: Yes  Neck pain: Yes  Swollen joints: No  Joint pain: Yes  Bone pain: No  Muscle cramps: No  Muscle weakness: Yes  Joint stiffness: No  Bone fracture: No  Please answer the questions below to tell us what condition you are experiencing: (Submitted on 9/20/2023)  Edema or swelling: Yes  Anemia: No  Swollen glands: Yes  Easy bleeding or bruising: No  Please answer the questions below to tell us what condition you are experiencing: (Submitted on 9/20/2023)  Trouble with coordination: No  Dizziness or trouble with balance: Yes  Fainting or black-out spells: No  Memory loss: No  Headache: Yes  Seizures: No  Speech problems: No  Tingling: No  Tremor: No  Weakness: Yes  Difficulty walking: Yes  Paralysis: No  Numbness: No  Please answer the questions below to tell us what condition you are experiencing: (Submitted on 9/20/2023)  Nervous or Anxious: Yes  Depression: Yes  Trouble sleeping: No  Trouble thinking or concentrating: No  Mood changes: No  Panic attacks: No

## 2023-09-29 ENCOUNTER — OFFICE VISIT (OUTPATIENT)
Dept: CARDIOLOGY | Facility: CLINIC | Age: 78
End: 2023-09-29
Attending: NURSE PRACTITIONER
Payer: MEDICARE

## 2023-09-29 ENCOUNTER — MYC MEDICAL ADVICE (OUTPATIENT)
Dept: DERMATOLOGY | Facility: CLINIC | Age: 78
End: 2023-09-29

## 2023-09-29 VITALS
OXYGEN SATURATION: 98 % | WEIGHT: 205.6 LBS | SYSTOLIC BLOOD PRESSURE: 144 MMHG | HEART RATE: 63 BPM | HEIGHT: 60 IN | BODY MASS INDEX: 40.37 KG/M2 | DIASTOLIC BLOOD PRESSURE: 56 MMHG

## 2023-09-29 DIAGNOSIS — L64.9 ANDROGENIC ALOPECIA: Primary | ICD-10-CM

## 2023-09-29 DIAGNOSIS — K21.9 GASTROESOPHAGEAL REFLUX DISEASE WITHOUT ESOPHAGITIS: ICD-10-CM

## 2023-09-29 DIAGNOSIS — G47.33 OSA (OBSTRUCTIVE SLEEP APNEA): ICD-10-CM

## 2023-09-29 DIAGNOSIS — I10 BENIGN ESSENTIAL HYPERTENSION: Primary | ICD-10-CM

## 2023-09-29 DIAGNOSIS — E78.2 MIXED HYPERLIPIDEMIA: ICD-10-CM

## 2023-09-29 DIAGNOSIS — N18.30 STAGE 3 CHRONIC KIDNEY DISEASE, UNSPECIFIED WHETHER STAGE 3A OR 3B CKD (H): ICD-10-CM

## 2023-09-29 PROCEDURE — 99214 OFFICE O/P EST MOD 30 MIN: CPT | Performed by: NURSE PRACTITIONER

## 2023-09-29 RX ORDER — FAMOTIDINE 20 MG/1
40 TABLET, FILM COATED ORAL DAILY
Qty: 180 TABLET | Refills: 3 | Status: SHIPPED | OUTPATIENT
Start: 2023-09-29 | End: 2024-03-25

## 2023-09-29 NOTE — LETTER
9/29/2023    Shola Benoit MD, MD  [address]  [facility] [address]    RE: Chasity Hodgson       Dear Colleague,     I had the pleasure of seeing Chasity Hodgson in the [facility].  CARDIOLOGY CLINIC NOTE    PRIMARY CARDIOLOGIST  Dr. Bhatia     PRIMARY CARE PHYSICIAN:  Shola Benoit MD    HISTORY OF PRESENT ILLNESS:  Chasity Hodgson is a pleasant 77 year old patient who carries a past medical history significant for nonocclusive coronary artery disease, chronic kidney disease, hypertension, PAD, DVT, diabetes, hyperlipidemia, hypothyroidism, colostomy ,obstructive sleep apnea, and childhood polio.     She was last seen on 6/29/2023 in follow up to exertional shortness of breath, intermittent chest pain, and palpitations.  A nuclear stress test was negative for inducible myocardial ischemia or infarction.  She also wore a 3-day Zio patch monitor that showed rare PACs/PVCs and no other significant arrhythmias.  An echocardiogram in March showed a normal ejection fraction estimated at 65 to 70%, normal RV size and function, no significant valvular disease.  She was noted to be moderately hypertensive and was switched from lisinopril to olmesartan.  Chlorthalidone was reduced to every other day due to nuisance urination.    She returns to the office today for 3-month follow-up.  Her primary complaint is fatigue and mild lower extremity edema managed with as needed Lasix.  She carries a history of mild obstructive sleep apnea but intolerant to CPAP.  She denies chest pain, shortness of breath, occasional palpitations, PND, orthopnea, presyncope, or syncope.  Upon exam, lungs are clear bilaterally, heart rate and rhythm regular, difficult to assess JVD due to obesity, and bilateral lower extremity edema with leg wraps.  She was recently seen by her dermatologist who recommended minoxidil for hair loss.  She is requesting to stop chlorthalidone with this no addition.    Blood  pressure is mildly elevated today at 144/56, home blood pressure log shows well-controlled blood pressures.  Last BMP showed a sodium of 140, potassium 4.6, BUN 28, creatinine 1.37, GFR 40  Hemoglobin 10.9, hematocrit 32.5, platelet 186  Lipid panel showed a total cholesterol of 172, HDL 32, LDL 83, triglycerides 284    PAST MEDICAL HISTORY:  Past Medical History:   Diagnosis Date    Abdominal adhesions 1984, 96,99    s/p lysis    Abdominal adhesions     Acne rosacea     Allergic rhinitis     Allergic rhinitis, cause unspecified     Alopecia     Anemia     CAD (coronary artery disease)     Carotid stenosis     CKD (chronic kidney disease) stage 2, GFR 60-89 ml/min     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)     CPAP (continuous positive airway pressure) dependence     Depression     Diabetic gastroparesis (H)     Diet-controlled type 2 diabetes mellitus (H)     DM2 (diabetes mellitus, type 2) (H)     DVT (deep venous thrombosis) (H)     DVT of axillary vein, acute right (H)     Fibromyalgia     Gastro-oesophageal reflux disease     GERD (gastroesophageal reflux disease)     Hernia of unspecified site of abdominal cavity without mention of obstruction or gangrene     bilateral    Hernia, abdominal     History of blood transfusion     10/ 1980    History of thrombophlebitis     HTN (hypertension)     HTN (hypertension)     Hypertriglyceridemia     Hypertriglyceridemia     Hypokalemia     Hypothyroidism     Mumps     Obstructive sleep apnea     ANNETTE (obstructive sleep apnea)     ANNETTE on CPAP     Osteoarthritis of glenohumeral joint     Papillary carcinoma of thyroid (H)     s/p thyroidectomy - Ruegemer    Papillary carcinoma of thyroid (H)     PE (pulmonary embolism)     Poliomyelitis     poor circulation right leg    Poliomyelitis     Postsurgical hypothyroidism     s/p papillary thryoid cancer - Ruegemer    Pulmonary embolism (H)     Pulmonary embolus (H)     Pulmonary nodule     Rosacea     S/P carpal tunnel release     bilateral     S/P hardware removal 01/2014    still with lingering foot pain    S/P shoulder surgery     bilateral    Septic joint (H)     right knee    Septic joint of right knee joint (H)     Venous insufficiency     Venous insufficiency     Venous thrombosis 1999    right axillary vein       MEDICATIONS:  Current Outpatient Medications   Medication    acetaminophen (TYLENOL) 500 MG tablet    acetaminophen-caffeine (EXCEDRIN TENSION HEADACHE) 500-65 MG TABS    amoxicillin (AMOXIL) 500 MG capsule    aspirin (ASA) 81 MG EC tablet    azelastine (ASTEPRO) 0.15 % nasal spray    carvedilol (COREG) 12.5 MG tablet    clotrimazole (LOTRIMIN) 1 % cream    Continuous Blood Gluc Sensor (FREESTYLE BRANDY 3 SENSOR) MISC    empagliflozin (JARDIANCE) 10 MG TABS tablet    ezetimibe (ZETIA) 10 MG tablet    famotidine (PEPCID) 20 MG tablet    fenofibrate (TRICOR) 145 MG tablet    fexofenadine (ALLEGRA) 180 MG tablet    fluocinolone acetonide (DERMA SMOOTHE/FS BODY) 0.01 % external oil    fluticasone (FLONASE) 50 MCG/ACT spray    furosemide (LASIX) 20 MG tablet    Icosapent Ethyl 1 g CAPS    insulin aspart (NOVOLOG FLEXPEN) 100 UNIT/ML pen    insulin glargine (LANTUS VIAL) 100 UNIT/ML vial    insulin pen needle (B-D U/F) 31G X 5 MM miscellaneous    Lidocaine (LIDOCARE) 4 % Patch    MULTIPLE VITAMIN PO    nitroGLYcerin (NITROSTAT) 0.4 MG sublingual tablet    nystatin (MYCOSTATIN) cream    nystatin-triamcinolone (MYCOLOG II) cream    olmesartan (BENICAR) 40 MG tablet    ondansetron (ZOFRAN) 4 MG tablet    order for DME    pantoprazole (PROTONIX) 40 MG EC tablet    Polyethylene Glycol 400 (BLINK TEARS) 0.25 % GEL    polyethylene glycol 400 (BLINK TEARS) 0.25 % SOLN ophthalmic solution    sucralfate (CARAFATE) 1 GM/10ML suspension    SYNTHROID 112 MCG tablet    tretinoin (RETIN-A) 0.025 % external cream    triamcinolone (KENALOG) 0.1 % external ointment    Vitamin D3 50 mcg (2000 units) tablet     No current facility-administered medications for this  visit.       SOCIAL HISTORY:  I have reviewed this patient's social history and updated it with pertinent information if needed. Chasity Hodgson  reports that she has never smoked. She has never used smokeless tobacco. She reports that she does not drink alcohol and does not use drugs.    PHYSICAL EXAM:  Pulse:  [63] 63  BP: (144)/(56) 144/56  SpO2:  [98 %] 98 %  205 lbs 9.6 oz    Constitutional: alert, no distress  Respiratory: Good bilateral air entry  Cardiovascular: Regular rate and rhythm  GI: nondistended  Neuropsychiatric: appropriate affact    ASSESSMENT: Pertinent issues addressed/ reviewed during this cardiology visit  Hypertension  Hyperlipidemia  CKD  Nonobstructive CAD  Obstructive sleep apnea  Obesity  Palpitations      RECOMMENDATIONS:  Overall, she is doing well on a cardiac standpoint.  No symptoms of ischemia.  Recent cardiac work-up showed a negative nuclear stress test.  Continue aspirin, carvedilol, olmesartan  Zio patch monitor showed rare PACs/PVCs and no other significant arrhythmias.  Continue carvedilol  Leg edema, managed with compression stockings and leg wraps.  Continue on as needed Lasix.  Due to worsening renal function I will stop chlorthalidone.   Okay to start minoxidil per dermatologist.  Follow-up 6 months with Dr. Bhatia or sooner if needed.    It was a pleasure seeing this patient in clinic today. Please do not hesitate to contact me with any future questions.     MAGALY Titus, CNP  Cardiology - [facility]  September 29, 2023    Review of the result(s) of each unique test - Last echocardiogram, zio patch monitor, nuclear stress test, BMP      The level of medical decision making during this visit was of moderate complexity.    This note was completed in part using dictation via the Dragon voice recognition software. Some word and grammatical errors may occur and must be interpreted in the appropriate clinical context.  If there are any questions pertaining to this issue,  please contact me for further clarification.    Thank you for allowing me to participate in the care of your patient.      Sincerely,     MAGALY Titus CNP     [facility]  cc:   MAGALY Titus CNP  [address] [place] [address]

## 2023-09-29 NOTE — PATIENT INSTRUCTIONS
Thanks for participating in a office visit with the [facility] clinic today.    Blood pressures mildly elevated today, home pressures well controlled  Ok to start minoxidil for hair loss, as directed by dermatology.   Stop chlorthalidone. Continue lasix as needed for leg swelling.   Continue all other medications  Strict low salt diet  Encourage exercise and weight loss.     Follow up in 6 months with CHRIS     Please call my nurse at  [phone] with any questions or concerns.    Scheduling phone number: [phone]  Reminder: Please bring in all current medications, over the counter supplements and vitamin bottles to your next appointment.

## 2023-09-29 NOTE — PROGRESS NOTES
CARDIOLOGY CLINIC NOTE    PRIMARY CARDIOLOGIST  Dr. Bhatia     PRIMARY CARE PHYSICIAN:  Shola Benoit MD    HISTORY OF PRESENT ILLNESS:  Chasity Hodgson is a pleasant 77 year old patient who carries a past medical history significant for nonocclusive coronary artery disease, chronic kidney disease, hypertension, PAD, DVT, diabetes, hyperlipidemia, hypothyroidism, colostomy ,obstructive sleep apnea, and childhood polio.     She was last seen on 6/29/2023 in follow up to exertional shortness of breath, intermittent chest pain, and palpitations.  A nuclear stress test was negative for inducible myocardial ischemia or infarction.  She also wore a 3-day Zio patch monitor that showed rare PACs/PVCs and no other significant arrhythmias.  An echocardiogram in March showed a normal ejection fraction estimated at 65 to 70%, normal RV size and function, no significant valvular disease.  She was noted to be moderately hypertensive and was switched from lisinopril to olmesartan.  Chlorthalidone was reduced to every other day due to nuisance urination.    She returns to the office today for 3-month follow-up.  Her primary complaint is fatigue and mild lower extremity edema managed with as needed Lasix.  She carries a history of mild obstructive sleep apnea but intolerant to CPAP.  She denies chest pain, shortness of breath, occasional palpitations, PND, orthopnea, presyncope, or syncope.  Upon exam, lungs are clear bilaterally, heart rate and rhythm regular, difficult to assess JVD due to obesity, and bilateral lower extremity edema with leg wraps.  She was recently seen by her dermatologist who recommended minoxidil for hair loss.  She is requesting to stop chlorthalidone with this no addition.    Blood pressure is mildly elevated today at 144/56, home blood pressure log shows well-controlled blood pressures.  Last BMP showed a sodium of 140, potassium 4.6, BUN 28, creatinine 1.37, GFR 40  Hemoglobin 10.9, hematocrit  32.5, platelet 186  Lipid panel showed a total cholesterol of 172, HDL 32, LDL 83, triglycerides 284    PAST MEDICAL HISTORY:  Past Medical History:   Diagnosis Date    Abdominal adhesions 1984, 96,99    s/p lysis    Abdominal adhesions     Acne rosacea     Allergic rhinitis     Allergic rhinitis, cause unspecified     Alopecia     Anemia     CAD (coronary artery disease)     Carotid stenosis     CKD (chronic kidney disease) stage 2, GFR 60-89 ml/min     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)     CPAP (continuous positive airway pressure) dependence     Depression     Diabetic gastroparesis (H)     Diet-controlled type 2 diabetes mellitus (H)     DM2 (diabetes mellitus, type 2) (H)     DVT (deep venous thrombosis) (H)     DVT of axillary vein, acute right (H)     Fibromyalgia     Gastro-oesophageal reflux disease     GERD (gastroesophageal reflux disease)     Hernia of unspecified site of abdominal cavity without mention of obstruction or gangrene     bilateral    Hernia, abdominal     History of blood transfusion     10/ 1980    History of thrombophlebitis     HTN (hypertension)     HTN (hypertension)     Hypertriglyceridemia     Hypertriglyceridemia     Hypokalemia     Hypothyroidism     Mumps     Obstructive sleep apnea     ANNETTE (obstructive sleep apnea)     ANNETTE on CPAP     Osteoarthritis of glenohumeral joint     Papillary carcinoma of thyroid (H)     s/p thyroidectomy - Ruegemer    Papillary carcinoma of thyroid (H)     PE (pulmonary embolism)     Poliomyelitis     poor circulation right leg    Poliomyelitis     Postsurgical hypothyroidism     s/p papillary thryoid cancer - Ruegemer    Pulmonary embolism (H)     Pulmonary embolus (H)     Pulmonary nodule     Rosacea     S/P carpal tunnel release     bilateral    S/P hardware removal 01/2014    still with lingering foot pain    S/P shoulder surgery     bilateral    Septic joint (H)     right knee    Septic joint of right knee joint (H)     Venous insufficiency     Venous  insufficiency     Venous thrombosis 1999    right axillary vein       MEDICATIONS:  Current Outpatient Medications   Medication    acetaminophen (TYLENOL) 500 MG tablet    acetaminophen-caffeine (EXCEDRIN TENSION HEADACHE) 500-65 MG TABS    amoxicillin (AMOXIL) 500 MG capsule    aspirin (ASA) 81 MG EC tablet    azelastine (ASTEPRO) 0.15 % nasal spray    carvedilol (COREG) 12.5 MG tablet    clotrimazole (LOTRIMIN) 1 % cream    Continuous Blood Gluc Sensor (FREESTYLE BRANDY 3 SENSOR) MISC    empagliflozin (JARDIANCE) 10 MG TABS tablet    ezetimibe (ZETIA) 10 MG tablet    famotidine (PEPCID) 20 MG tablet    fenofibrate (TRICOR) 145 MG tablet    fexofenadine (ALLEGRA) 180 MG tablet    fluocinolone acetonide (DERMA SMOOTHE/FS BODY) 0.01 % external oil    fluticasone (FLONASE) 50 MCG/ACT spray    furosemide (LASIX) 20 MG tablet    Icosapent Ethyl 1 g CAPS    insulin aspart (NOVOLOG FLEXPEN) 100 UNIT/ML pen    insulin glargine (LANTUS VIAL) 100 UNIT/ML vial    insulin pen needle (B-D U/F) 31G X 5 MM miscellaneous    Lidocaine (LIDOCARE) 4 % Patch    MULTIPLE VITAMIN PO    nitroGLYcerin (NITROSTAT) 0.4 MG sublingual tablet    nystatin (MYCOSTATIN) cream    nystatin-triamcinolone (MYCOLOG II) cream    olmesartan (BENICAR) 40 MG tablet    ondansetron (ZOFRAN) 4 MG tablet    order for DME    pantoprazole (PROTONIX) 40 MG EC tablet    Polyethylene Glycol 400 (BLINK TEARS) 0.25 % GEL    polyethylene glycol 400 (BLINK TEARS) 0.25 % SOLN ophthalmic solution    sucralfate (CARAFATE) 1 GM/10ML suspension    SYNTHROID 112 MCG tablet    tretinoin (RETIN-A) 0.025 % external cream    triamcinolone (KENALOG) 0.1 % external ointment    Vitamin D3 50 mcg (2000 units) tablet     No current facility-administered medications for this visit.       SOCIAL HISTORY:  I have reviewed this patient's social history and updated it with pertinent information if needed. Chasity SU Rema  reports that she has never smoked. She has never used smokeless  tobacco. She reports that she does not drink alcohol and does not use drugs.    PHYSICAL EXAM:  Pulse:  [63] 63  BP: (144)/(56) 144/56  SpO2:  [98 %] 98 %  205 lbs 9.6 oz    Constitutional: alert, no distress  Respiratory: Good bilateral air entry  Cardiovascular: Regular rate and rhythm  GI: nondistended  Neuropsychiatric: appropriate affact    ASSESSMENT: Pertinent issues addressed/ reviewed during this cardiology visit  Hypertension  Hyperlipidemia  CKD  Nonobstructive CAD  Obstructive sleep apnea  Obesity  Palpitations      RECOMMENDATIONS:  Overall, she is doing well on a cardiac standpoint.  No symptoms of ischemia.  Recent cardiac work-up showed a negative nuclear stress test.  Continue aspirin, carvedilol, olmesartan  Zio patch monitor showed rare PACs/PVCs and no other significant arrhythmias.  Continue carvedilol  Leg edema, managed with compression stockings and leg wraps.  Continue on as needed Lasix.  Due to worsening renal function I will stop chlorthalidone.   Okay to start minoxidil per dermatologist.  Follow-up 6 months with Dr. Bhatia or sooner if needed.    It was a pleasure seeing this patient in clinic today. Please do not hesitate to contact me with any future questions.     MAGALY Titus, CNP  Cardiology - [facility]  September 29, 2023    Review of the result(s) of each unique test - Last echocardiogram, zio patch monitor, nuclear stress test, BMP      The level of medical decision making during this visit was of moderate complexity.    This note was completed in part using dictation via the Dragon voice recognition software. Some word and grammatical errors may occur and must be interpreted in the appropriate clinical context.  If there are any questions pertaining to this issue, please contact me for further clarification.

## 2023-10-02 ENCOUNTER — LAB (OUTPATIENT)
Dept: LAB | Facility: CLINIC | Age: 78
End: 2023-10-02
Payer: MEDICARE

## 2023-10-02 ENCOUNTER — HOSPITAL ENCOUNTER (OUTPATIENT)
Facility: CLINIC | Age: 78
Discharge: HOME | End: 2023-10-02
Attending: INTERNAL MEDICINE | Admitting: INTERNAL MEDICINE
Payer: MEDICARE

## 2023-10-02 DIAGNOSIS — C73 MALIGNANT NEOPLASM OF THYROID GLAND (H): ICD-10-CM

## 2023-10-02 DIAGNOSIS — E11.9 TYPE 2 DIABETES, HBA1C GOAL < 7% (H): ICD-10-CM

## 2023-10-02 DIAGNOSIS — C73 PAPILLARY CARCINOMA OF THYROID (H): ICD-10-CM

## 2023-10-02 DIAGNOSIS — E78.2 MIXED HYPERLIPIDEMIA: ICD-10-CM

## 2023-10-02 LAB
ALBUMIN SERPL BCG-MCNC: 3.9 G/DL (ref 3.5–5.2)
ALP SERPL-CCNC: 48 U/L (ref 35–104)
ALT SERPL W P-5'-P-CCNC: 15 U/L (ref 0–50)
ANION GAP SERPL CALCULATED.3IONS-SCNC: 13 MMOL/L (ref 7–15)
AST SERPL W P-5'-P-CCNC: 32 U/L (ref 0–45)
BILIRUB SERPL-MCNC: 0.5 MG/DL
BUN SERPL-MCNC: 26.3 MG/DL (ref 8–23)
CALCIUM SERPL-MCNC: 9.3 MG/DL (ref 8.8–10.2)
CHLORIDE SERPL-SCNC: 106 MMOL/L (ref 98–107)
CHOLEST SERPL-MCNC: 160 MG/DL
CREAT SERPL-MCNC: 1.38 MG/DL (ref 0.51–0.95)
CREAT UR-MCNC: 85.3 MG/DL
DEPRECATED HCO3 PLAS-SCNC: 20 MMOL/L (ref 22–29)
EGFRCR SERPLBLD CKD-EPI 2021: 39 ML/MIN/1.73M2
GLUCOSE SERPL-MCNC: 173 MG/DL (ref 70–99)
HBA1C MFR BLD: 8.4 % (ref 0–5.6)
HDLC SERPL-MCNC: 30 MG/DL
LDLC SERPL CALC-MCNC: 72 MG/DL
MICROALBUMIN UR-MCNC: 370 MG/L
MICROALBUMIN/CREAT UR: 433.76 MG/G CR (ref 0–25)
NONHDLC SERPL-MCNC: 130 MG/DL
POTASSIUM SERPL-SCNC: 4.8 MMOL/L (ref 3.4–5.3)
PROT SERPL-MCNC: 6.7 G/DL (ref 6.4–8.3)
SODIUM SERPL-SCNC: 139 MMOL/L (ref 135–145)
T4 FREE SERPL-MCNC: 1.84 NG/DL (ref 0.9–1.7)
TRIGL SERPL-MCNC: 288 MG/DL
TSH SERPL DL<=0.005 MIU/L-ACNC: 3.44 UIU/ML (ref 0.3–4.2)
VIT D+METAB SERPL-MCNC: 58 NG/ML (ref 20–50)

## 2023-10-02 PROCEDURE — 82570 ASSAY OF URINE CREATININE: CPT

## 2023-10-02 PROCEDURE — 99000 SPECIMEN HANDLING OFFICE-LAB: CPT | Performed by: INTERNAL MEDICINE

## 2023-10-02 PROCEDURE — 84439 ASSAY OF FREE THYROXINE: CPT

## 2023-10-02 PROCEDURE — 36415 COLL VENOUS BLD VENIPUNCTURE: CPT | Performed by: INTERNAL MEDICINE

## 2023-10-02 PROCEDURE — 82043 UR ALBUMIN QUANTITATIVE: CPT

## 2023-10-02 PROCEDURE — 80053 COMPREHEN METABOLIC PANEL: CPT

## 2023-10-02 PROCEDURE — 83036 HEMOGLOBIN GLYCOSYLATED A1C: CPT | Performed by: INTERNAL MEDICINE

## 2023-10-02 PROCEDURE — 86800 THYROGLOBULIN ANTIBODY: CPT | Mod: 90 | Performed by: INTERNAL MEDICINE

## 2023-10-02 PROCEDURE — 84432 ASSAY OF THYROGLOBULIN: CPT | Mod: 90 | Performed by: INTERNAL MEDICINE

## 2023-10-02 PROCEDURE — 80061 LIPID PANEL: CPT | Performed by: INTERNAL MEDICINE

## 2023-10-02 PROCEDURE — 84443 ASSAY THYROID STIM HORMONE: CPT | Performed by: INTERNAL MEDICINE

## 2023-10-02 RX ORDER — MINOXIDIL 2.5 MG/1
0.25 TABLET ORAL DAILY
COMMUNITY
End: 2023-10-02

## 2023-10-02 RX ORDER — MINOXIDIL 2.5 MG/1
TABLET ORAL
Qty: 45 TABLET | Refills: 0 | Status: SHIPPED | OUTPATIENT
Start: 2023-10-02 | End: 2024-01-15

## 2023-10-02 NOTE — TELEPHONE ENCOUNTER
I have a few tablets left but will need a prescription sent to the [facility] aka [facility]

## 2023-10-02 NOTE — TELEPHONE ENCOUNTER
RECOMMENDATIONS:  Overall, she is doing well on a cardiac standpoint.  No symptoms of ischemia.  Recent cardiac work-up showed a negative nuclear stress test.  Continue aspirin, carvedilol, olmesartan  Zio patch monitor showed rare PACs/PVCs and no other significant arrhythmias.  Continue carvedilol  Leg edema, managed with compression stockings and leg wraps.  Continue on as needed Lasix.  Due to worsening renal function I will stop chlorthalidone.   Okay to start minoxidil per dermatologist.  Follow-up 6 months with Dr. Bhatia or sooner if needed.     It was a pleasure seeing this patient in clinic today. Please do not hesitate to contact me with any future questions.      MAGALY Titus, CNP  Cardiology - [facility]  September 29, 2023

## 2023-10-03 ENCOUNTER — OFFICE VISIT (OUTPATIENT)
Dept: UROLOGY | Facility: CLINIC | Age: 78
End: 2023-10-03
Payer: MEDICARE

## 2023-10-03 VITALS
DIASTOLIC BLOOD PRESSURE: 80 MMHG | SYSTOLIC BLOOD PRESSURE: 138 MMHG | WEIGHT: 200 LBS | HEIGHT: 60 IN | BODY MASS INDEX: 39.27 KG/M2

## 2023-10-03 DIAGNOSIS — K11.7 DROOLING: ICD-10-CM

## 2023-10-03 DIAGNOSIS — Z48.89 ENCOUNTER FOR POSTOPERATIVE CARE: Primary | ICD-10-CM

## 2023-10-03 DIAGNOSIS — R20.0 NUMBNESS AROUND MOUTH: ICD-10-CM

## 2023-10-03 DIAGNOSIS — N32.81 OAB (OVERACTIVE BLADDER): ICD-10-CM

## 2023-10-03 LAB
ALBUMIN UR-MCNC: 100 MG/DL
APPEARANCE UR: ABNORMAL
BILIRUB UR QL STRIP: NEGATIVE
COLOR UR AUTO: YELLOW
GLUCOSE UR STRIP-MCNC: >=1000 MG/DL
HGB UR QL STRIP: ABNORMAL
KETONES UR STRIP-MCNC: NEGATIVE MG/DL
LEUKOCYTE ESTERASE UR QL STRIP: ABNORMAL
NITRATE UR QL: NEGATIVE
PH UR STRIP: 7.5 [PH] (ref 5–7)
RESIDUAL VOLUME (RV) (EXTERNAL): 7
SP GR UR STRIP: 1.02 (ref 1–1.03)
UROBILINOGEN UR STRIP-ACNC: 0.2 E.U./DL

## 2023-10-03 PROCEDURE — 87086 URINE CULTURE/COLONY COUNT: CPT | Performed by: PHYSICIAN ASSISTANT

## 2023-10-03 PROCEDURE — 81003 URINALYSIS AUTO W/O SCOPE: CPT | Mod: QW | Performed by: PHYSICIAN ASSISTANT

## 2023-10-03 PROCEDURE — 87088 URINE BACTERIA CULTURE: CPT | Performed by: PHYSICIAN ASSISTANT

## 2023-10-03 PROCEDURE — 51798 US URINE CAPACITY MEASURE: CPT | Performed by: PHYSICIAN ASSISTANT

## 2023-10-03 PROCEDURE — 99024 POSTOP FOLLOW-UP VISIT: CPT | Mod: 25 | Performed by: PHYSICIAN ASSISTANT

## 2023-10-03 PROCEDURE — 87186 SC STD MICRODIL/AGAR DIL: CPT | Performed by: PHYSICIAN ASSISTANT

## 2023-10-03 ASSESSMENT — PAIN SCALES - GENERAL: PAINLEVEL: SEVERE PAIN (6)

## 2023-10-03 NOTE — NURSING NOTE
Chief Complaint   Patient presents with    Urinary Frequency     Here for 4 week check after Botox.  Ivana Young LPN     post void residual by bladder scan 7 ml  Ivana Young LPN

## 2023-10-03 NOTE — LETTER
10/3/2023       RE: Chasity Hodgson  [id number]  [place] [facility] [place] [address]     Dear Colleague,    Thank you for referring your patient, Chasity Hodgson, to the [facility] [place] at [facility]. Please see a copy of my visit note below.    October 3, 2023    Post operative visit    Has hx of OAB and neurogenic bladder. UDS on 7/6/23 showed DO/DOI. Has failed OAB medications. S/p cysto with botox 100 units on 9/18/23 and here for follow-up.  Is very pleased with botox. Prior to botox having UUI about 4x/day. Since botox, has only had 3 episodes total of UUI. No UTIs since botox per EMR. Thinks she has UTI today; reports dysuria. Denies fevers/chills, flank pain, gross hematuria. Patient states she has been having intermittent drooling and numbness of her lower oral labia along the right lateral side. Reassured patient that symptoms likely not from botox. Denies unilateral weakness or issues with speech. She also has history of prior urethral bulking, history of colostomy with question of rectal prolapse. Patient voices no other concerns at this time.     /80   Ht 1.524 m (5')   Wt 90.7 kg (200 lb)   BMI 39.06 kg/m    She is comfortable, in no distress, non-labored breathing. CN II-XII appear grossly in tact. No obvious facial drooping or issues with speech, unilateral weakness of upper or lower extremities. Ambulating well with walker.     PVR 7 mL by bladder scan    UA RESULTS:  Recent Labs   Lab Test 10/03/23  1001 08/25/23  1308   COLOR Yellow Yellow   APPEARANCE Cloudy* Clear   URINEGLC >=1000* >=1000*   URINEBILI Negative Negative   URINEKETONE Negative Negative   SG 1.020 1.015   UBLD Moderate* Negative   URINEPH 7.5* 5.5   PROTEIN 100* Negative   UROBILINOGEN 0.2 0.2   NITRITE Negative Negative   LEUKEST Large* Negative   RBCU  --  0-2   WBCU  --  0-5       A/P: 77 year old F with s/p botox 100 units with cysto. Also reports intermittent  right lateral lower oral labia numbness and drooling.     - PVR WNL.  - UA positive for protein, WBC, glucose and RBC. Will send for culture and treat based on those results. If symptoms worsen to contact clinic and consider treating empirically. Advised to go to ER if develops fevers/chills, flank pain or gross hematuria.   - Offered patient to go to ER and declined. Advised patient to go to ER if experiences mouth numbness/drooling again. Recommend contacting her PCP and ENT specialist right away today for further recommendations. Do not think symptoms are secondary to botox.   - follow-up in 3-4 months.     Lupe Mcrae PA-C  Urology    CC  Patient Care Team:  Shola Benoit MD as PCP - General (Internal Medicine)  Shola Benoit MD as Assigned PCP  Renetta Meyer MD as MD (Dermatology)  ROMI Roque MD as MD (INTERNAL MEDICINE - ENDOCRINOLOGY, DIABETES & METABOLISM)  Ulises Pantoja MD as MD (Gastroenterology)  Kishore Ferrera MD as MD (Orthopedics)  Tara Cornejo, RN as Diabetes Educator (Diabetes Education)  Catrachita Prather, PharmD as Pharmacist (Pharmacist)  Odette Weston MD as MD (Endocrinology, Diabetes, and Metabolism)  Odette Weston MD as Assigned Endocrinology Provider  Goltz, Bennett Ezra, PA-C as Assigned Neuroscience Provider  Terra Bridges PA-C as Physician Assistant (Urology)  Amber Chan APRN CNP as Assigned Heart and Vascular Provider  Marbella Martinez PA-C as Physician Assistant (Urology)  Yolanda Tapia PA-C as Physician Assistant (Endocrinology, Diabetes,

## 2023-10-03 NOTE — PROGRESS NOTES
October 3, 2023    Post operative visit    Has hx of OAB and neurogenic bladder. UDS on 7/6/23 showed DO/DOI. Has failed OAB medications. S/p cysto with botox 100 units on 9/18/23 and here for follow-up.  Is very pleased with botox. Prior to botox having UUI about 4x/day. Since botox, has only had 3 episodes total of UUI. No UTIs since botox per EMR. Thinks she has UTI today; reports dysuria. Denies fevers/chills, flank pain, gross hematuria. Patient states she has been having intermittent drooling and numbness of her lower oral labia along the right lateral side. Reassured patient that symptoms likely not from botox. Denies unilateral weakness or issues with speech. She also has history of prior urethral bulking, history of colostomy with question of rectal prolapse. Patient voices no other concerns at this time.     /80   Ht 1.524 m (5')   Wt 90.7 kg (200 lb)   BMI 39.06 kg/m    She is comfortable, in no distress, non-labored breathing. CN II-XII appear grossly in tact. No obvious facial drooping or issues with speech, unilateral weakness of upper or lower extremities. Ambulating well with walker.     PVR 7 mL by bladder scan    UA RESULTS:  Recent Labs   Lab Test 10/03/23  1001 08/25/23  1308   COLOR Yellow Yellow   APPEARANCE Cloudy* Clear   URINEGLC >=1000* >=1000*   URINEBILI Negative Negative   URINEKETONE Negative Negative   SG 1.020 1.015   UBLD Moderate* Negative   URINEPH 7.5* 5.5   PROTEIN 100* Negative   UROBILINOGEN 0.2 0.2   NITRITE Negative Negative   LEUKEST Large* Negative   RBCU  --  0-2   WBCU  --  0-5       A/P: 77 year old F with s/p botox 100 units with cysto. Also reports intermittent right lateral lower oral labia numbness and drooling.     - PVR WNL.  - UA positive for protein, WBC, glucose and RBC. Will send for culture and treat based on those results. If symptoms worsen to contact clinic and consider treating empirically. Advised to go to ER if develops fevers/chills, flank pain  or gross hematuria.   - Offered patient to go to ER and declined. Advised patient to go to ER if experiences mouth numbness/drooling again. Recommend contacting her PCP and ENT specialist right away today for further recommendations. Do not think symptoms are secondary to botox.   - follow-up in 3-4 months.     Lupe Mcrae PA-C  Urology    CC  Patient Care Team:  Shola Benoit MD as PCP - General (Internal Medicine)  Shola Benoit MD as Assigned PCP  Renetta Meyer MD as MD (Dermatology)  ROMI Roque MD as MD (INTERNAL MEDICINE - ENDOCRINOLOGY, DIABETES & METABOLISM)  Ulises Pantoja MD as MD (Gastroenterology)  Kishore Ferrera MD as MD (Orthopedics)  Tara Cornejo, RN as Diabetes Educator (Diabetes Education)  Catrachita Prather, PharmD as Pharmacist (Pharmacist)  Odette Weston MD as MD (Endocrinology, Diabetes, and Metabolism)  Odette Weston MD as Assigned Endocrinology Provider  Goltz, Bennett Ezra, PA-C as Assigned Neuroscience Provider  Terra Bridges PA-C as Physician Assistant (Urology)  Amber Chan APRN CNP as Assigned Heart and Vascular Provider  Marbella Martinez PA-C as Physician Assistant (Urology)  Yolanda Tapia PA-C as Physician Assistant (Endocrinology, Diabetes, and Metabolism)  Mishel Zendejas MD as Assigned Surgical Provider

## 2023-10-03 NOTE — PATIENT INSTRUCTIONS
Follow-up in 3-4 months    Your urine sample is pending and will notify you once back.    Recommend contacting your primary care doctor and ENT doctor about the drooling and numbness of the face. Go to the ER if persistent, worsens or continues.

## 2023-10-04 LAB — BACTERIA UR CULT: ABNORMAL

## 2023-10-05 DIAGNOSIS — N39.0 URINARY TRACT INFECTION WITHOUT HEMATURIA, SITE UNSPECIFIED: Primary | ICD-10-CM

## 2023-10-05 RX ORDER — CEPHALEXIN 500 MG/1
500 CAPSULE ORAL 2 TIMES DAILY
Qty: 14 CAPSULE | Refills: 0 | Status: SHIPPED | OUTPATIENT
Start: 2023-10-05 | End: 2023-11-08

## 2023-10-05 NOTE — RESULT ENCOUNTER NOTE
Hello -    Here are my comments about the recent results. Overall levels look ok... relatively stable compared to previous. Hopefully your blood sugars are looking better!    Also-- my daughter loves the towel! I'll have to get a picture to show you next visit.    Please let us know if you have any questions or concerns.    Regards,  Odette Weston MD

## 2023-10-09 LAB — SCANNED LAB RESULT: NORMAL

## 2023-10-23 ENCOUNTER — IMMUNIZATION (OUTPATIENT)
Dept: INTERNAL MEDICINE | Facility: CLINIC | Age: 78
End: 2023-10-23
Payer: MEDICARE

## 2023-10-23 PROCEDURE — 90480 ADMN SARSCOV2 VAC 1/ONLY CMP: CPT

## 2023-10-23 PROCEDURE — 91320 SARSCV2 VAC 30MCG TRS-SUC IM: CPT

## 2023-10-26 ENCOUNTER — MYC MEDICAL ADVICE (OUTPATIENT)
Dept: ENDOCRINOLOGY | Facility: CLINIC | Age: 78
End: 2023-10-26
Payer: MEDICARE

## 2023-10-26 ENCOUNTER — MYC REFILL (OUTPATIENT)
Dept: ENDOCRINOLOGY | Facility: CLINIC | Age: 78
End: 2023-10-26
Payer: MEDICARE

## 2023-10-26 DIAGNOSIS — E78.2 MIXED HYPERLIPIDEMIA: ICD-10-CM

## 2023-10-26 DIAGNOSIS — C73 PAPILLARY CARCINOMA OF THYROID (H): ICD-10-CM

## 2023-10-26 DIAGNOSIS — E11.9 TYPE 2 DIABETES, HBA1C GOAL < 7% (H): Primary | ICD-10-CM

## 2023-10-26 RX ORDER — LEVOTHYROXINE SODIUM 112 MCG
TABLET ORAL
Qty: 90 TABLET | Refills: 3 | OUTPATIENT
Start: 2023-10-26

## 2023-10-26 RX ORDER — ICOSAPENT ETHYL 1 G/1
1 CAPSULE ORAL 2 TIMES DAILY
Qty: 60 CAPSULE | Refills: 11 | Status: SHIPPED | OUTPATIENT
Start: 2023-10-26 | End: 2023-12-11

## 2023-10-26 RX ORDER — EZETIMIBE 10 MG/1
10 TABLET ORAL DAILY
Qty: 90 TABLET | Refills: 3 | OUTPATIENT
Start: 2023-10-26

## 2023-10-26 NOTE — TELEPHONE ENCOUNTER
"Requested Prescriptions   Refused Prescriptions Disp Refills    ezetimibe (ZETIA) 10 MG tablet 90 tablet 3     Sig: Take 1 tablet (10 mg) by mouth daily       Antihyperlipidemic agents Passed - 10/26/2023 11:41 AM        Passed - Lipid panel on file in past 12 mos     Recent Labs   Lab Test 10/02/23  1304 08/01/17  0000 02/15/17  0000   CHOL 160   < > 142   TRIG 288*   < > 309   HDL 30*   < > 31   LDL 72   < > 49   NHDL 130*   < > 111   CHOLHDLRATIO  --   --  4.6    < > = values in this interval not displayed.               Passed - Normal serum ALT on record in past 12 mos     Recent Labs   Lab Test 10/02/23  1304   ALT 15             Passed - Recent (12 mo) or future (30 days) visit within the authorizing provider's specialty     Patient has had an office visit with the authorizing provider or a provider within the authorizing providers department within the previous 12 mos or has a future within next 30 days. See \"Patient Info\" tab in inbasket, or \"Choose Columns\" in Meds & Orders section of the refill encounter.              Passed - Medication is active on med list        Passed - Patient is age 18 years or older        Passed - No active pregnancy on record        Passed - No positive pregnancy test in past 12 mos             Patient had year supply sent on 9/27/23. Spoke with pharmacy who confirmed patient has Rx available and is ready for . Patient updated via [employer]. Refill refused.    Brayan Bell RN    "

## 2023-10-26 NOTE — TELEPHONE ENCOUNTER
Requested Prescriptions   Pending Prescriptions Disp Refills    icosapent ethyl (VASCEPA) 1 g CAPS capsule [Pharmacy Med Name: ICOSAPENT ETHYL 1GM CAPS] 60 capsule 11     Sig: TAKE 1 CAPSULE BY MOUTH 2 TIMES DAILY       There is no refill protocol information for this order        Last Written Prescription Date:  8/19/22  Last Fill Quantity: 60 cap,  # refills: 11   Last office visit: 9/27/2023 ; last virtual visit: 3/21/2023 with prescribing provider:  Dr Weston   Future Office Visit:  7/3/24    Refill sent  Brayan Bell RN

## 2023-10-26 NOTE — TELEPHONE ENCOUNTER
Per LOV with Dr Weston 9/27/23  - Basaglar increase 20% to 30u every morning     Pended updated RX for Basaglar and routed to Dr Weston to review.    Brayan Bell RN

## 2023-10-26 NOTE — TELEPHONE ENCOUNTER
"Requested Prescriptions   Pending Prescriptions Disp Refills    SYNTHROID 112 MCG tablet 90 tablet 3     Sig: TAKE ONE-HALF TABLET BY MOUTH DAILY ON FRIDAYS AND TAKE ONE TABLET BY MOUTH ALL OTHER DAYS AS DIRECTED       Thyroid Protocol Passed - 10/26/2023 11:56 AM        Passed - Patient is 12 years or older        Passed - Recent (12 mo) or future (30 days) visit within the authorizing provider's specialty     Patient has had an office visit with the authorizing provider or a provider within the authorizing providers department within the previous 12 mos or has a future within next 30 days. See \"Patient Info\" tab in inbasket, or \"Choose Columns\" in Meds & Orders section of the refill encounter.              Passed - Medication is active on med list        Passed - Normal TSH on file in past 12 months     Recent Labs   Lab Test 10/02/23  1304   TSH 3.44              Passed - No active pregnancy on record     If patient is pregnant or has had a positive pregnancy test, please check TSH.          Passed - No positive pregnancy test in past 12 months     If patient is pregnant or has had a positive pregnancy test, please check TSH.             Patient had year supply sent on 9/27/23. Spoke with pharmacy who confirmed patient has Rx available and is ready for . Patient updated via [employer]. Refill refused.    Brayan Bell RN  "

## 2023-10-27 ENCOUNTER — TELEPHONE (OUTPATIENT)
Dept: ENDOCRINOLOGY | Facility: CLINIC | Age: 78
End: 2023-10-27
Payer: MEDICARE

## 2023-10-27 DIAGNOSIS — E11.9 TYPE 2 DIABETES, HBA1C GOAL < 7% (H): ICD-10-CM

## 2023-10-27 RX ORDER — INSULIN GLARGINE 100 [IU]/ML
30 INJECTION, SOLUTION SUBCUTANEOUS EVERY MORNING
Qty: 30 ML | Refills: 1 | Status: SHIPPED | OUTPATIENT
Start: 2023-10-27 | End: 2023-12-11

## 2023-10-27 RX ORDER — INSULIN GLARGINE 100 [IU]/ML
30 INJECTION, SOLUTION SUBCUTANEOUS EVERY MORNING
Qty: 30 ML | Refills: 1 | Status: SHIPPED | OUTPATIENT
Start: 2023-10-27 | End: 2023-10-27

## 2023-10-27 NOTE — TELEPHONE ENCOUNTER
[facility] Call Center    Phone Message    May a detailed message be left on voicemail: yes     Reason for Call: Other: please resubmit rx with update information it has to state may substitute  Basaglar or write new rx for Baslagar per insurance requirements       Action Taken: Other: Endo    Travel Screening: Not Applicable

## 2023-10-30 ENCOUNTER — MYC MEDICAL ADVICE (OUTPATIENT)
Dept: ENDOCRINOLOGY | Facility: CLINIC | Age: 78
End: 2023-10-30
Payer: MEDICARE

## 2023-10-30 DIAGNOSIS — C73 PAPILLARY CARCINOMA OF THYROID (H): Primary | ICD-10-CM

## 2023-11-01 RX ORDER — LEVOTHYROXINE SODIUM 112 UG/1
112 TABLET ORAL DAILY
Qty: 90 TABLET | Refills: 3 | Status: SHIPPED | OUTPATIENT
Start: 2023-11-01 | End: 2024-05-09

## 2023-11-02 DIAGNOSIS — I10 BENIGN ESSENTIAL HYPERTENSION: ICD-10-CM

## 2023-11-02 RX ORDER — OLMESARTAN MEDOXOMIL 40 MG/1
40 TABLET ORAL DAILY
Qty: 30 TABLET | Refills: 3 | OUTPATIENT
Start: 2023-11-02

## 2023-11-02 RX ORDER — OLMESARTAN MEDOXOMIL 40 MG/1
40 TABLET ORAL DAILY
Qty: 90 TABLET | Refills: 1 | Status: SHIPPED | OUTPATIENT
Start: 2023-11-02 | End: 2024-02-16

## 2023-11-06 ENCOUNTER — MYC MEDICAL ADVICE (OUTPATIENT)
Dept: FAMILY MEDICINE | Facility: CLINIC | Age: 78
End: 2023-11-06
Payer: MEDICARE

## 2023-11-07 ENCOUNTER — NURSE TRIAGE (OUTPATIENT)
Dept: FAMILY MEDICINE | Facility: CLINIC | Age: 78
End: 2023-11-07
Payer: MEDICARE

## 2023-11-07 ENCOUNTER — TELEPHONE (OUTPATIENT)
Dept: FAMILY MEDICINE | Facility: CLINIC | Age: 78
End: 2023-11-07
Payer: MEDICARE

## 2023-11-07 NOTE — PROGRESS NOTES
Assessment & Plan     Open wound of left knee, leg, and ankle, initial encounter  Open wound/mild surrounding cellulitis left lower extremity.  Given warmth and risk factors for worsening disease reasonable to prescribe antibiotics.  Keflex 500 mg twice daily x 5 days.  Adaptic, gauze pad, and ACE wrap applied.  Keep area clean and dry.  Repeat dressing at home at least once daily or if soiled.  Home care for weekly wound care checks.  Reviewed signs symptoms that warrant re-evaluation in the meantime.  - Home Care Referral  - cephALEXin (KEFLEX) 500 MG capsule  Dispense: 10 capsule; Refill: 0    Need for prophylactic vaccination and inoculation against influenza  Flu shot given today.    20 minutes spent by me on the date of the encounter doing chart review, review of outside records, review of test results, interpretation of tests, patient visit, and documentation        The likelihood of other entities in the differential is insufficient to justify any further testing for them at this time. This was explained to the patient. The patient was advised that persistent or worsening symptoms would require further evaluation. Patient advised to call the office and if unable to reach to go to the emergency room if they develop any new or worsening symptoms. Expressed understanding and agreement with above stated plan.     KOKI Jones [facility] LEE ANN Cochran is a 78 year old, presenting for the following health issues:  Wound Check (Left leg ) and Imm/Inj (Flu Shot)    Here today for evaluation of a left leg wound.   Scrapped anterior aspect of left shin on a [employer] mobility bus.  This occurred on 11/3/2023. Wound with clear/yellow discharge.  Skin irritation.  Admission to the hospital for left lower extremity cellulitis a few months ago. 6/22 TD immunization.  No fever, chills, sweats.  History of diabetes.    History of Present Illness       Reason for visit:  I bumped my left  leg last Friday and it broke the skin, I have had cellulitis in that leg several times. I went to put antibiotics ointment on the area on Saturday and noticed some loose skin. A fairly large piece of skin came off.  Red and sore.  Symptom onset:  3-7 days ago  Symptoms include:  Redness, draining yellow liquid, sore. Hx of cellulitis.  Symptom intensity:  Severe  Symptom progression:  Worsening  Had these symptoms before:  Yes  Has tried/received treatment for these symptoms:  Yes  Previous treatment was successful:  Yes  Prior treatment description:  Was on antibiotics for cellulitis  What makes it worse:  Today it is very sore all the time.  What makes it better:  No    She eats 2-3 servings of fruits and vegetables daily.She consumes 0 sweetened beverage(s) daily.She exercises with enough effort to increase her heart rate 9 or less minutes per day.  She exercises with enough effort to increase her heart rate 3 or less days per week.   She is taking medications regularly.       Review of Systems   Constitutional, HEENT, cardiovascular, pulmonary, GI, , musculoskeletal, neuro, skin, endocrine and psych systems are negative, except as otherwise noted.      Objective    BP (!) 150/70 (BP Location: Right arm, Patient Position: Sitting, Cuff Size: Adult Regular)   Pulse 61   Temp 96.9  F (36.1  C) (Temporal)   Resp 18   Ht 1.524 m (5')   Wt 94.6 kg (208 lb 8 oz)   SpO2 100%   BMI 40.72 kg/m    Body mass index is 40.72 kg/m .  Physical Exam   GENERAL: healthy, alert and no distress  EYES: Eyes grossly normal to inspection, PERRL and conjunctivae and sclerae normal  NECK: no adenopathy, no asymmetry, masses, or scars and thyroid normal to palpation  RESP: lungs clear to auscultation - no rales, rhonchi or wheezes  CV: regular rate and rhythm, normal S1 S2, no S3 or S4, no murmur, click or rub, peripheral pulses strong  MS: no gross musculoskeletal defects noted.  Baseline lower extremity edema.  SKIN: See  picture below.  Quarter sized skin tear located over the anterior lateral portion of her left lower extremity.  No abnormal discharge.  Mild surrounding warmth.  No pain with palpation.  No additional suspicious lesions or rashes.  NEURO: Normal strength and tone, mentation intact and speech normal  PSYCH: mentation appears normal, affect normal/bright

## 2023-11-07 NOTE — TELEPHONE ENCOUNTER
Gerard Gaspar,     Thank you for reaching out regarding your health care concern.   Please call [phone] and choose option #2 to speak to a triage nurse about your symptoms so we can best direct your care.     Thank you,  [facility] RN Triage Team    This [facility] message has not been read.           Shola Benoit MD Physician Signed3:41 PM     Addend     Delete     Copy     It's difficult to determine if the skin is reddened on the photo.  Can we call her to see if she is noticing any signs of cellulitis?     Shola Benoit MD, MD Sammie BECK  Triage Im (supporting Shola Benoit MD)17 hours ago (10:56 PM)       Last Friday, November 3rd I bumped my leg on something metal that punctured the skin. I was on my way to the [facility] for a pre orientation for my . It happened as I was trying to get to the back of the [employer] bus or after I got off and bumped it on a wheelchair. I didn t know it had punctured the skin until later in the evening when I took my compression stocking off. It didn t tear the stocking but there is some drainage on it. That s the leg I have been having issues with cellulitis and because of a recent infection I didn t want it to get infected again so on Saturday I put some Bacitracin ointment on it with a gauze pad and an ace wrap. When I went to put the ointment on it a large piece of skin came off the wound. Today I took the ace and gauze off to take a shower and this is what it looks like and it has been draining a clear liquid and stings a lot. So I wrapped it again with the gauze pad and ace. Is there anything I need to be doing to care for it? Thank you   Attachments   [id number]

## 2023-11-07 NOTE — TELEPHONE ENCOUNTER
It's difficult to determine if the skin is reddened on the photo.  Can we call her to see if she is noticing any signs of cellulitis?    Shola Benoit MD, MD

## 2023-11-07 NOTE — TELEPHONE ENCOUNTER
Nurse Triage SBAR    Is this a 2nd Level Triage? YES, LICENSED PRACTITIONER REVIEW IS REQUIRED    Situation: Patient bumped leg last Friday when getting out of the [employer] mobility van. She says it bled initially, but now the wound is weeping a clear liquid. Patient  has been seen in hospital for cellulitis a while ago and would like to avoid anymore issues with needing the hospital. Patient needs to be seen. Nurse scheduled for tomorrow as patient uses [employer] mobility and cannot go to [facility]. Please advise if agreeable to this. Scheduled with Leonides Rogers at 1.     Background: cellulitis at the end of October     Assessment: needs to be seen     Protocol Recommended Disposition:   See in Office Today    Recommendation: needs to be seen     Routed to provider    Does the patient meet one of the following criteria for ADS visit consideration? No    Reason for Disposition   Patient wants to be seen    Additional Information   Negative: Major bleeding (actively dripping or spurting) that can't be stopped   Negative: Bullet, stabbed by knife, or other serious penetrating wound   Negative: Looks like a dislocated joint (crooked or deformed)   Negative: Can't stand (bear weight) or walk   Negative: Serious injury with multiple fractures (broken bones)   Negative: Sounds like a life-threatening emergency to the triager   Negative: Wound looks infected   Negative: Leg pain from overuse (e.g., sports, running, physical work)   Negative: Leg pain not from an injury   Negative: Hip injury is main concern   Negative: Knee injury is main concern   Negative: Foot or ankle injury is main concern   Negative: SEVERE pain (e.g., excruciating pain, unable to do any normal activities)   Negative: Skin is split open or gaping (or length > 1/2 inch or 12 mm)   Negative: Bleeding won't stop after 10 minutes of direct pressure (using correct technique)   Negative: Dirt in the wound and not removed with 15 minutes of scrubbing    "Negative: Sounds like a serious injury to the triager   Negative: Looks infected (e.g., spreading redness, red streak, pus)   Negative: No prior tetanus shots (or is not fully vaccinated) and any wound (e.g., cut or scrape)   Negative: HIV positive or severe immunodeficiency (severely weak immune system) and DIRTY cut or scrape    Answer Assessment - Initial Assessment Questions  1. MECHANISM: \"How did the injury happen?\" (e.g., twisting injury, direct blow)       Last Friday going to [facility]. Maybe happened getting off the bus     2. ONSET: \"When did the injury happen?\" (Minutes or hours ago)       Last friday  3. LOCATION: \"Where is the injury located?\"       On the outside of left leg   4. APPEARANCE of INJURY: \"What does the injury look like?\"  (e.g., deformity of leg)      Looks like red and [place] about 4-5 inches around   5. SEVERITY: \"Can you put weight on that leg?\" \"Can you walk?\"       Yes   6. SIZE: For cuts, bruises, or swelling, ask: \"How large is it?\" (e.g., inches or centimeters)       4-5 inches  7. PAIN: \"Is there pain?\" If Yes, ask: \"How bad is the pain?\"   \"What does it keep you from doing?\" (e.g., Scale 1-10; or mild, moderate, severe)    -  NONE: (0): no pain    -  MILD (1-3): doesn't interfere with normal activities     -  MODERATE (4-7): interferes with normal activities (e.g., work or school) or awakens from sleep, limping     -  SEVERE (8-10): excruciating pain, unable to do any normal activities, unable to walk      7-8/10  8. TETANUS: For any breaks in the skin, ask: \"When was the last tetanus booster?      Can't remember   9. OTHER SYMPTOMS: \"Do you have any other symptoms?\"       Nope,   10. PREGNANCY: \"Is there any chance you are pregnant?\" \"When was your last menstrual period?\"        No    Protocols used: Leg Injury-A-[place]    "

## 2023-11-07 NOTE — TELEPHONE ENCOUNTER
Patient calling to state that [facility] will not be able to book a same-day appt for tomorrow, being that they're already closed. Writer advised patient that, if at all possible, to keep appt scheduled for tomorrow.    Patient plans to call a friend to see if she can get a ride to the clinic tomorrow. Writer advised patient to keep tomorrow's appt unless she is unable to find a ride, in which case patient can call back to the clinic and cancel appt on 11/08/23 and schedule for another time.    Kiersten Fuller, RN  -[facility]

## 2023-11-08 ENCOUNTER — OFFICE VISIT (OUTPATIENT)
Dept: FAMILY MEDICINE | Facility: CLINIC | Age: 78
End: 2023-11-08
Payer: MEDICARE

## 2023-11-08 VITALS
HEIGHT: 60 IN | DIASTOLIC BLOOD PRESSURE: 70 MMHG | TEMPERATURE: 96.9 F | RESPIRATION RATE: 18 BRPM | SYSTOLIC BLOOD PRESSURE: 150 MMHG | HEART RATE: 61 BPM | WEIGHT: 208.5 LBS | BODY MASS INDEX: 40.93 KG/M2 | OXYGEN SATURATION: 100 %

## 2023-11-08 DIAGNOSIS — S91.002A OPEN WOUND OF LEFT KNEE, LEG, AND ANKLE, INITIAL ENCOUNTER: Primary | ICD-10-CM

## 2023-11-08 DIAGNOSIS — S81.802A OPEN WOUND OF LEFT KNEE, LEG, AND ANKLE, INITIAL ENCOUNTER: Primary | ICD-10-CM

## 2023-11-08 DIAGNOSIS — S81.002A OPEN WOUND OF LEFT KNEE, LEG, AND ANKLE, INITIAL ENCOUNTER: Primary | ICD-10-CM

## 2023-11-08 DIAGNOSIS — Z23 NEED FOR PROPHYLACTIC VACCINATION AND INOCULATION AGAINST INFLUENZA: ICD-10-CM

## 2023-11-08 PROCEDURE — 90662 IIV NO PRSV INCREASED AG IM: CPT | Performed by: PHYSICIAN ASSISTANT

## 2023-11-08 PROCEDURE — G0008 ADMIN INFLUENZA VIRUS VAC: HCPCS | Performed by: PHYSICIAN ASSISTANT

## 2023-11-08 PROCEDURE — 99213 OFFICE O/P EST LOW 20 MIN: CPT | Mod: 25 | Performed by: PHYSICIAN ASSISTANT

## 2023-11-08 RX ORDER — CEPHALEXIN 500 MG/1
500 CAPSULE ORAL 2 TIMES DAILY
Qty: 10 CAPSULE | Refills: 0 | Status: SHIPPED | OUTPATIENT
Start: 2023-11-08 | End: 2023-11-13

## 2023-11-08 ASSESSMENT — PAIN SCALES - GENERAL: PAINLEVEL: SEVERE PAIN (6)

## 2023-11-08 ASSESSMENT — PATIENT HEALTH QUESTIONNAIRE - PHQ9
10. IF YOU CHECKED OFF ANY PROBLEMS, HOW DIFFICULT HAVE THESE PROBLEMS MADE IT FOR YOU TO DO YOUR WORK, TAKE CARE OF THINGS AT HOME, OR GET ALONG WITH OTHER PEOPLE: NOT DIFFICULT AT ALL
SUM OF ALL RESPONSES TO PHQ QUESTIONS 1-9: 2
SUM OF ALL RESPONSES TO PHQ QUESTIONS 1-9: 2

## 2023-11-08 NOTE — COMMUNITY RESOURCES LIST (ENGLISH)
11/08/2023   [facility]  N/A  For questions about this resource list or additional care needs, please contact your primary care clinic or care manager.  Phone: [phone]   Email: N/A   Address: [address] [place] [id number]   Hours: N/A        Transportation       Free or low-cost transportation  1  [employer]. Distance: 4.07 miles      In-Person   [address] [facility] [address] [place] [address]  Language: English  Hours: Mon - Fri 7:00 AM - 5:00 PM  Fees: Free   Phone: (766) 930-2046 Email: [email or web address]@[employer] Website: https://1DayLater/     2  [employer] Distance: 10.46 miles      [address] [place] [address]  Language: English, Uzbek, Hmong, Japanese, Azerbaijani  Hours: Mon - Sun 5:00 AM - 10:00 PM  Fees: Self Pay   Phone: (941) 123-6226 Email: [email or web address]_[email or web address] Website: [email or web address]/Transportation/[email or web address]     Transportation to medical appointments  3  [employer] Distance: 6.93 miles      In-Person   [address] [place] [address]  Language: Chinese, English, Oromo, Japanese  Hours: Mon - Sat 5:00 AM - 9:00 PM  Fees: Insurance, Self Pay   Phone: (617) 100-3187 Email: info@[employer] Website: http://www.[employer]/     4  Ass[employer] Transport Distance: 9.46 miles      In-Person   [address] [place] [address]  Language: English, Azerbaijani  Hours: Mon - Sun Appt. Only  Fees: Self Pay   Phone: (420) 710-2728 Email: cami@[employer] Website: http://www.[employer]/          Important Numbers & Websites       Emergency Services   911  City Services   311  Poison Control   (254) 467-5450  Suicide Prevention Lifeline   (132) 258-5418 (TALK)  Child Abuse Hotline   (587) 160-7394 (4-A-Child)  Sexual Assault Hotline   (117) 251-1899 (HOPE)  National Runaway Safeline   (215) 952-8114  (RUNAWAY)  All-Options Talkline   (839) 227-9452  Substance Abuse Referral   (752) 631-3395 (HELP)

## 2023-11-13 ENCOUNTER — TELEPHONE (OUTPATIENT)
Dept: FAMILY MEDICINE | Facility: CLINIC | Age: 78
End: 2023-11-13
Payer: MEDICARE

## 2023-11-13 NOTE — TELEPHONE ENCOUNTER
Home Care is calling regarding an established patient with [facility].       Requesting orders from: Shola Benoit  Provider is following patient: Yes  Is this a 60-day recertification request?  No    Orders Requested    Skilled Nursing  SN 1w9 for wound care    Occupational Therapy  Request for initial evaluation and treatment (one time)             Verbal orders given for OT .  Information was gathered for SN.  Provider review needed.  RN will contact Home Care with information after provider review.  Confirmed ok to leave a detailed message with call back.  Contact information confirmed and updated as needed.    Lianna Montiel RN

## 2023-11-13 NOTE — TELEPHONE ENCOUNTER
Home Care Contact - Saundra PINK    Attempt # 1    Was call answered?  No.  Left message on voicemail with information to call triage back. Voicemail did not indicate okay to leave detailed orders.    Left message for Saundra to callback to triage     On callback - notify home care of PCP's approval for requested home care orders    Shravan Pollard RN  [facility]

## 2023-11-14 NOTE — TELEPHONE ENCOUNTER
Saundra, RN called back. Gave PCP message. She verbalized understanding.     Dee Handy RN on 11/14/2023 at 7:33 AM

## 2023-11-15 ENCOUNTER — NURSE TRIAGE (OUTPATIENT)
Dept: FAMILY MEDICINE | Facility: CLINIC | Age: 78
End: 2023-11-15
Payer: MEDICARE

## 2023-11-15 NOTE — TELEPHONE ENCOUNTER
"Home care nurse called to relay that, Patient has a wound on LLE and currently taking cephalexin. The lower extremity leg. Wound is on the leg.   Patient was called to triage:     Nurse Triage SBAR    Is this a 2nd Level Triage? YES, LICENSED PRACTITIONER REVIEW IS REQUIRED    Situation: \"Patient has a wound on LLE and currently taking cephalexin. The lower extremity leg. Wound is on the leg\" reported by home care nurse . Patient having left leg pain reported by her home health nurse. Patient  aware that any symptoms such as SOB or greater leg pain may warrant a visit to the ER. However, the ADS will see the patient tomorrow at 10:00 at patients request.     Background: Patient needs to be seen. Has wound on left leg     Assessment: needs to be seen     Protocol Recommended Disposition:   Go To ED/UCC Now (Or To Office With PCP Approval)    Recommendation: Patient signed up for the ADS tomorrow      Scheduled for the ADS    Does the patient meet one of the following criteria for ADS visit consideration? 16+ years old, with an [facility] PCP     TIP  Providers, please consider if this condition is appropriate for management at one of our Acute and Diagnostic Services sites.     If patient is a good candidate, please use dotphrase <dot>triageresponse and select Refer to ADS to document.      APRYL Reyes  [facility] Triage     Reason for Disposition   Thigh or calf pain in only one leg and present > 1 hour    Additional Information   Negative: Looks like a broken bone or dislocated joint (e.g., crooked or deformed)   Negative: Sounds like a life-threatening emergency to the triager   Negative: Followed a hip injury   Negative: Followed a knee injury   Negative: Followed an ankle or foot injury   Negative: Back pain radiating (shooting) into leg(s)   Negative: Foot pain is main symptom   Negative: Ankle pain is main symptom   Negative: Knee pain is main symptom   Negative: Leg swelling is main symptom   Negative: " "Chest pain   Negative: Difficulty breathing   Negative: Entire foot is cool or blue in comparison to other side   Negative: Unable to walk   Negative: Fever and red area (or area very tender to touch)   Negative: Swollen joint and fever    Answer Assessment - Initial Assessment Questions  1. ONSET: \"When did the pain start?\"       Pain started on and off, aching and burning this morning   2. LOCATION: \"Where is the pain located?\"       LLE  3. PAIN: \"How bad is the pain?\"    (Scale 1-10; or mild, moderate, severe)    -  MILD (1-3): doesn't interfere with normal activities     -  MODERATE (4-7): interferes with normal activities (e.g., work or school) or awakens from sleep, limping     -  SEVERE (8-10): excruciating pain, unable to do any normal activities, unable to walk      8/10  4. WORK OR EXERCISE: \"Has there been any recent work or exercise that involved this part of the body?\"       no  5. CAUSE: \"What do you think is causing the leg pain?\"      andres  6. OTHER SYMPTOMS: \"Do you have any other symptoms?\" (e.g., chest pain, back pain, breathing difficulty, swelling, rash, fever, numbness, weakness)      Swelling in LLE, ace wrap, hurts and warm   7. PREGNANCY: \"Is there any chance you are pregnant?\" \"When was your last menstrual period?\"      No    Protocols used: Leg Pain-A-[place]    "

## 2023-11-16 ENCOUNTER — OFFICE VISIT (OUTPATIENT)
Dept: PEDIATRICS | Facility: CLINIC | Age: 78
End: 2023-11-16
Payer: MEDICARE

## 2023-11-16 ENCOUNTER — HOSPITAL ENCOUNTER (OUTPATIENT)
Facility: CLINIC | Age: 78
Setting detail: OBSERVATION
Discharge: HOME | End: 2023-11-17
Attending: EMERGENCY MEDICINE | Admitting: INTERNAL MEDICINE
Payer: MEDICARE

## 2023-11-16 ENCOUNTER — ANCILLARY PROCEDURE (OUTPATIENT)
Dept: GENERAL RADIOLOGY | Facility: CLINIC | Age: 78
End: 2023-11-16
Attending: FAMILY MEDICINE
Payer: MEDICARE

## 2023-11-16 ENCOUNTER — ANCILLARY PROCEDURE (OUTPATIENT)
Dept: ULTRASOUND IMAGING | Facility: CLINIC | Age: 78
End: 2023-11-16
Attending: FAMILY MEDICINE
Payer: MEDICARE

## 2023-11-16 VITALS
HEART RATE: 56 BPM | WEIGHT: 208 LBS | HEIGHT: 60 IN | SYSTOLIC BLOOD PRESSURE: 120 MMHG | BODY MASS INDEX: 40.84 KG/M2 | TEMPERATURE: 98.6 F | OXYGEN SATURATION: 100 % | DIASTOLIC BLOOD PRESSURE: 80 MMHG

## 2023-11-16 DIAGNOSIS — I83.892 VENOUS STASIS ULCER OF LEFT LOWER LEG WITH EDEMA OF LEFT LOWER LEG (H): ICD-10-CM

## 2023-11-16 DIAGNOSIS — R07.9 CHEST PAIN, UNSPECIFIED TYPE: ICD-10-CM

## 2023-11-16 DIAGNOSIS — R60.0 VENOUS STASIS ULCER OF LEFT LOWER LEG WITH EDEMA OF LEFT LOWER LEG (H): ICD-10-CM

## 2023-11-16 DIAGNOSIS — I25.118 CORONARY ARTERY DISEASE INVOLVING NATIVE HEART WITH OTHER FORM OF ANGINA PECTORIS, UNSPECIFIED VESSEL OR LESION TYPE (H): ICD-10-CM

## 2023-11-16 DIAGNOSIS — R07.89 OTHER CHEST PAIN: Primary | ICD-10-CM

## 2023-11-16 DIAGNOSIS — R07.9 ACUTE CHEST PAIN: ICD-10-CM

## 2023-11-16 DIAGNOSIS — I83.029 VENOUS STASIS ULCER OF LEFT LOWER LEG WITH EDEMA OF LEFT LOWER LEG (H): ICD-10-CM

## 2023-11-16 DIAGNOSIS — L97.929 VENOUS STASIS ULCER OF LEFT LOWER LEG WITH EDEMA OF LEFT LOWER LEG (H): ICD-10-CM

## 2023-11-16 DIAGNOSIS — R60.0 LEG EDEMA, LEFT: Primary | ICD-10-CM

## 2023-11-16 DIAGNOSIS — I10 BENIGN ESSENTIAL HYPERTENSION: ICD-10-CM

## 2023-11-16 LAB
ALBUMIN SERPL BCG-MCNC: 3.9 G/DL (ref 3.5–5.2)
ALP SERPL-CCNC: 47 U/L (ref 40–150)
ALT SERPL W P-5'-P-CCNC: 15 U/L (ref 0–50)
ANION GAP SERPL CALCULATED.3IONS-SCNC: 8 MMOL/L (ref 7–15)
AST SERPL W P-5'-P-CCNC: 31 U/L (ref 0–45)
ATRIAL RATE - MUSE: 59 BPM
BASOPHILS # BLD AUTO: 0 10E3/UL (ref 0–0.2)
BASOPHILS NFR BLD AUTO: 1 %
BILIRUB SERPL-MCNC: 0.2 MG/DL
BUN SERPL-MCNC: 27.4 MG/DL (ref 8–23)
CALCIUM SERPL-MCNC: 9.2 MG/DL (ref 8.8–10.2)
CHLORIDE SERPL-SCNC: 109 MMOL/L (ref 98–107)
CREAT SERPL-MCNC: 1.26 MG/DL (ref 0.51–0.95)
DEPRECATED HCO3 PLAS-SCNC: 25 MMOL/L (ref 22–29)
DIASTOLIC BLOOD PRESSURE - MUSE: NORMAL MMHG
EGFRCR SERPLBLD CKD-EPI 2021: 44 ML/MIN/1.73M2
EOSINOPHIL # BLD AUTO: 0.3 10E3/UL (ref 0–0.7)
EOSINOPHIL NFR BLD AUTO: 4 %
ERYTHROCYTE [DISTWIDTH] IN BLOOD BY AUTOMATED COUNT: 12.5 % (ref 10–15)
GLUCOSE BLDC GLUCOMTR-MCNC: 110 MG/DL (ref 70–99)
GLUCOSE SERPL-MCNC: 87 MG/DL (ref 70–99)
HCT VFR BLD AUTO: 33.7 % (ref 35–47)
HGB BLD-MCNC: 10.8 G/DL (ref 11.7–15.7)
IMM GRANULOCYTES # BLD: 0 10E3/UL
IMM GRANULOCYTES NFR BLD: 0 %
INTERPRETATION ECG - MUSE: NORMAL
LYMPHOCYTES # BLD AUTO: 2.1 10E3/UL (ref 0.8–5.3)
LYMPHOCYTES NFR BLD AUTO: 31 %
MCH RBC QN AUTO: 30.4 PG (ref 26.5–33)
MCHC RBC AUTO-ENTMCNC: 32 G/DL (ref 31.5–36.5)
MCV RBC AUTO: 95 FL (ref 78–100)
MONOCYTES # BLD AUTO: 0.6 10E3/UL (ref 0–1.3)
MONOCYTES NFR BLD AUTO: 8 %
NEUTROPHILS # BLD AUTO: 3.7 10E3/UL (ref 1.6–8.3)
NEUTROPHILS NFR BLD AUTO: 55 %
P AXIS - MUSE: 25 DEGREES
PLATELET # BLD AUTO: 194 10E3/UL (ref 150–450)
POTASSIUM SERPL-SCNC: 4.7 MMOL/L (ref 3.4–5.3)
PR INTERVAL - MUSE: 152 MS
PROT SERPL-MCNC: 6.7 G/DL (ref 6.4–8.3)
QRS DURATION - MUSE: 88 MS
QT - MUSE: 406 MS
QTC - MUSE: 401 MS
R AXIS - MUSE: -4 DEGREES
RBC # BLD AUTO: 3.55 10E6/UL (ref 3.8–5.2)
SODIUM SERPL-SCNC: 142 MMOL/L (ref 135–145)
SYSTOLIC BLOOD PRESSURE - MUSE: NORMAL MMHG
T AXIS - MUSE: 29 DEGREES
TROPONIN T SERPL HS-MCNC: 22 NG/L
TROPONIN T SERPL HS-MCNC: 27 NG/L
TROPONIN T SERPL HS-MCNC: 29 NG/L
VENTRICULAR RATE- MUSE: 59 BPM
WBC # BLD AUTO: 6.7 10E3/UL (ref 4–11)

## 2023-11-16 PROCEDURE — 71046 X-RAY EXAM CHEST 2 VIEWS: CPT | Mod: TC | Performed by: RADIOLOGY

## 2023-11-16 PROCEDURE — 93005 ELECTROCARDIOGRAM TRACING: CPT

## 2023-11-16 PROCEDURE — 93000 ELECTROCARDIOGRAM COMPLETE: CPT | Performed by: FAMILY MEDICINE

## 2023-11-16 PROCEDURE — 36415 COLL VENOUS BLD VENIPUNCTURE: CPT | Performed by: FAMILY MEDICINE

## 2023-11-16 PROCEDURE — 84484 ASSAY OF TROPONIN QUANT: CPT | Mod: 91 | Performed by: INTERNAL MEDICINE

## 2023-11-16 PROCEDURE — G0378 HOSPITAL OBSERVATION PER HR: HCPCS

## 2023-11-16 PROCEDURE — 84484 ASSAY OF TROPONIN QUANT: CPT | Performed by: FAMILY MEDICINE

## 2023-11-16 PROCEDURE — 99207 PR INPT ADMISSION FROM CLINIC: CPT | Performed by: FAMILY MEDICINE

## 2023-11-16 PROCEDURE — 36415 COLL VENOUS BLD VENIPUNCTURE: CPT | Performed by: EMERGENCY MEDICINE

## 2023-11-16 PROCEDURE — 36415 COLL VENOUS BLD VENIPUNCTURE: CPT | Performed by: INTERNAL MEDICINE

## 2023-11-16 PROCEDURE — 80053 COMPREHEN METABOLIC PANEL: CPT | Performed by: FAMILY MEDICINE

## 2023-11-16 PROCEDURE — 93971 EXTREMITY STUDY: CPT | Mod: LT

## 2023-11-16 PROCEDURE — 99285 EMERGENCY DEPT VISIT HI MDM: CPT

## 2023-11-16 PROCEDURE — 84484 ASSAY OF TROPONIN QUANT: CPT | Performed by: EMERGENCY MEDICINE

## 2023-11-16 PROCEDURE — 250N000013 HC RX MED GY IP 250 OP 250 PS 637: Performed by: INTERNAL MEDICINE

## 2023-11-16 PROCEDURE — 82962 GLUCOSE BLOOD TEST: CPT

## 2023-11-16 PROCEDURE — 250N000013 HC RX MED GY IP 250 OP 250 PS 637: Performed by: EMERGENCY MEDICINE

## 2023-11-16 PROCEDURE — 99223 1ST HOSP IP/OBS HIGH 75: CPT | Mod: AI | Performed by: INTERNAL MEDICINE

## 2023-11-16 PROCEDURE — 85025 COMPLETE CBC W/AUTO DIFF WBC: CPT | Performed by: FAMILY MEDICINE

## 2023-11-16 RX ORDER — AMOXICILLIN 250 MG
1 CAPSULE ORAL 2 TIMES DAILY PRN
Status: DISCONTINUED | OUTPATIENT
Start: 2023-11-16 | End: 2023-11-17 | Stop reason: HOSPADM

## 2023-11-16 RX ORDER — ACETAMINOPHEN 500 MG
500 TABLET ORAL ONCE
Status: COMPLETED | OUTPATIENT
Start: 2023-11-16 | End: 2023-11-16

## 2023-11-16 RX ORDER — LIDOCAINE 40 MG/G
CREAM TOPICAL
Status: DISCONTINUED | OUTPATIENT
Start: 2023-11-16 | End: 2023-11-17

## 2023-11-16 RX ORDER — HYDRALAZINE HYDROCHLORIDE 25 MG/1
25 TABLET, FILM COATED ORAL EVERY 6 HOURS PRN
Status: DISCONTINUED | OUTPATIENT
Start: 2023-11-16 | End: 2023-11-17 | Stop reason: HOSPADM

## 2023-11-16 RX ORDER — HYDRALAZINE HYDROCHLORIDE 20 MG/ML
10 INJECTION INTRAMUSCULAR; INTRAVENOUS EVERY 4 HOURS PRN
Status: DISCONTINUED | OUTPATIENT
Start: 2023-11-16 | End: 2023-11-17

## 2023-11-16 RX ORDER — ONDANSETRON 4 MG/1
4 TABLET, ORALLY DISINTEGRATING ORAL EVERY 6 HOURS PRN
Status: DISCONTINUED | OUTPATIENT
Start: 2023-11-16 | End: 2023-11-17 | Stop reason: HOSPADM

## 2023-11-16 RX ORDER — CARVEDILOL 12.5 MG/1
12.5 TABLET ORAL 2 TIMES DAILY WITH MEALS
Status: DISCONTINUED | OUTPATIENT
Start: 2023-11-16 | End: 2023-11-17 | Stop reason: HOSPADM

## 2023-11-16 RX ORDER — LOSARTAN POTASSIUM 100 MG/1
100 TABLET ORAL DAILY
Status: DISCONTINUED | OUTPATIENT
Start: 2023-11-17 | End: 2023-11-17 | Stop reason: HOSPADM

## 2023-11-16 RX ORDER — PANTOPRAZOLE SODIUM 40 MG/1
40 TABLET, DELAYED RELEASE ORAL 2 TIMES DAILY
Status: DISCONTINUED | OUTPATIENT
Start: 2023-11-16 | End: 2023-11-17 | Stop reason: HOSPADM

## 2023-11-16 RX ORDER — AMOXICILLIN 250 MG
2 CAPSULE ORAL 2 TIMES DAILY PRN
Status: DISCONTINUED | OUTPATIENT
Start: 2023-11-16 | End: 2023-11-17 | Stop reason: HOSPADM

## 2023-11-16 RX ORDER — LEVOTHYROXINE SODIUM 112 UG/1
112 TABLET ORAL
Status: DISCONTINUED | OUTPATIENT
Start: 2023-11-17 | End: 2023-11-17 | Stop reason: HOSPADM

## 2023-11-16 RX ORDER — NICOTINE POLACRILEX 4 MG
15-30 LOZENGE BUCCAL
Status: DISCONTINUED | OUTPATIENT
Start: 2023-11-16 | End: 2023-11-17 | Stop reason: HOSPADM

## 2023-11-16 RX ORDER — DEXTROSE MONOHYDRATE 25 G/50ML
25-50 INJECTION, SOLUTION INTRAVENOUS
Status: DISCONTINUED | OUTPATIENT
Start: 2023-11-16 | End: 2023-11-17 | Stop reason: HOSPADM

## 2023-11-16 RX ORDER — ONDANSETRON 2 MG/ML
4 INJECTION INTRAMUSCULAR; INTRAVENOUS EVERY 6 HOURS PRN
Status: DISCONTINUED | OUTPATIENT
Start: 2023-11-16 | End: 2023-11-17 | Stop reason: HOSPADM

## 2023-11-16 RX ORDER — ASPIRIN 81 MG/1
324 TABLET, CHEWABLE ORAL ONCE
Status: COMPLETED | OUTPATIENT
Start: 2023-11-16 | End: 2023-11-16

## 2023-11-16 RX ORDER — FAMOTIDINE 40 MG/1
40 TABLET, FILM COATED ORAL DAILY
COMMUNITY
Start: 2023-10-05 | End: 2023-11-16

## 2023-11-16 RX ORDER — ASPIRIN 81 MG/1
81 TABLET ORAL DAILY
Status: DISCONTINUED | OUTPATIENT
Start: 2023-11-17 | End: 2023-11-17 | Stop reason: HOSPADM

## 2023-11-16 RX ORDER — CALCIUM CARBONATE 500 MG/1
1000 TABLET, CHEWABLE ORAL 4 TIMES DAILY PRN
Status: DISCONTINUED | OUTPATIENT
Start: 2023-11-16 | End: 2023-11-17 | Stop reason: HOSPADM

## 2023-11-16 RX ADMIN — PANTOPRAZOLE SODIUM 40 MG: 40 TABLET, DELAYED RELEASE ORAL at 22:13

## 2023-11-16 RX ADMIN — ACETAMINOPHEN 500 MG: 500 TABLET, FILM COATED ORAL at 19:37

## 2023-11-16 RX ADMIN — ASPIRIN 81 MG CHEWABLE TABLET 324 MG: 81 TABLET CHEWABLE at 13:43

## 2023-11-16 RX ADMIN — CARVEDILOL 12.5 MG: 12.5 TABLET, FILM COATED ORAL at 22:13

## 2023-11-16 ASSESSMENT — ACTIVITIES OF DAILY LIVING (ADL)
ADLS_ACUITY_SCORE: 29
ADLS_ACUITY_SCORE: 40
ADLS_ACUITY_SCORE: 27

## 2023-11-16 ASSESSMENT — PAIN SCALES - GENERAL: PAINLEVEL: EXTREME PAIN (8)

## 2023-11-16 NOTE — ED TRIAGE NOTES
Chest pain started last night, went to clinic today for concerns of cellulitis to lt LE, xray and trop done, slightly elevated trop, sent here for further evaluation     Triage Assessment (Adult)       Row Name 11/16/23 1327          Triage Assessment    Airway WDL WDL        Respiratory WDL    Respiratory WDL WDL        Cardiac WDL    Cardiac WDL chest pain        Cognitive/Neuro/Behavioral WDL    Cognitive/Neuro/Behavioral WDL WDL

## 2023-11-16 NOTE — ED PROVIDER NOTES
History     Chief Complaint:  Chest Pain       HPI   Chasity Hodgson is a 78 year old female with a past medical history of PE, HTN, CAD, CKD, DVT, diabetes, and hypothyroidism who presents with chest pain. The patient states that last night she developed chest pain. She states that it is more of an ache that lasts about an hour. Occasionally she feels it more under her left breast. She denies any nausea, diaphoresis, shortness of breath, or palpitations. She went into her clinic this morning for possible cellulitis in her LLE. Patient states there is a open wound there with some drainage. Patient states that she had an ultrasound of the leg and there was no blood clot. She also had a EKG, chest xray, and lab work which showed elevated troponin.    Independent Historian:   None - Patient Only    Review of External Notes:   Reviewed results of previous Lexiscan from 4/14/23:      The nuclear stress test is negative for inducible myocardial ischemia or infarction.    Left ventricular function is hyperdynamic due to presence of small LV cavity.    A prior study was conducted on 2/6/2018.  This study has changes noted when compared with the prior study. No ischemia seen in current study.  I reviewed results from family practice visit earlier in the day which included a chest x-ray, Doppler ultrasound of the left lower extremity, blood work and EKG.          Results for orders placed or performed in visit on 11/16/23 (from the past 24 hour(s))   CBC with platelets differential     Narrative     The following orders were created for panel order CBC with platelets differential.  Procedure                               Abnormality         Status                     ---------                               -----------         ------                     CBC with platelets and d...[[id number]]  Abnormal            Final result                  Please view results for these tests on the individual orders.   Comprehensive  metabolic panel   Result Value Ref Range     Sodium 142 135 - 145 mmol/L     Potassium 4.7 3.4 - 5.3 mmol/L     Carbon Dioxide (CO2) 25 22 - 29 mmol/L     Anion Gap 8 7 - 15 mmol/L     Urea Nitrogen 27.4 (H) 8.0 - 23.0 mg/dL     Creatinine 1.26 (H) 0.51 - 0.95 mg/dL     GFR Estimate 44 (L) >60 mL/min/1.73m2     Calcium 9.2 8.8 - 10.2 mg/dL     Chloride 109 (H) 98 - 107 mmol/L     Glucose 87 70 - 99 mg/dL     Alkaline Phosphatase 47 40 - 150 U/L     AST 31 0 - 45 U/L     ALT 15 0 - 50 U/L     Protein Total 6.7 6.4 - 8.3 g/dL     Albumin 3.9 3.5 - 5.2 g/dL     Bilirubin Total 0.2 <=1.2 mg/dL   Troponin T, High Sensitivity   Result Value Ref Range     Troponin T, High Sensitivity 29 (H) <=14 ng/L   CBC with platelets and differential   Result Value Ref Range     WBC Count 6.7 4.0 - 11.0 10e3/uL     RBC Count 3.55 (L) 3.80 - 5.20 10e6/uL     Hemoglobin 10.8 (L) 11.7 - 15.7 g/dL     Hematocrit 33.7 (L) 35.0 - 47.0 %     MCV 95 78 - 100 fL     MCH 30.4 26.5 - 33.0 pg     MCHC 32.0 31.5 - 36.5 g/dL     RDW 12.5 10.0 - 15.0 %     Platelet Count 194 150 - 450 10e3/uL     % Neutrophils 55 %     % Lymphocytes 31 %     % Monocytes 8 %     % Eosinophils 4 %     % Basophils 1 %     % Immature Granulocytes 0 %     Absolute Neutrophils 3.7 1.6 - 8.3 10e3/uL     Absolute Lymphocytes 2.1 0.8 - 5.3 10e3/uL     Absolute Monocytes 0.6 0.0 - 1.3 10e3/uL     Absolute Eosinophils 0.3 0.0 - 0.7 10e3/uL     Absolute Basophils 0.0 0.0 - 0.2 10e3/uL     Absolute Immature Granulocytes 0.0 <=0.4 10e3/uL   US Lower Extremity Venous Duplex Left     Narrative     EXAM: US LOWER EXTREMITY VENOUS DUPLEX LEFT  LOCATION: [facility]  DATE: 11/16/2023     INDICATION: Left leg swelling and redness; ? bumped it during [employer] ride and scraped her skin; serous leakage since  COMPARISON: Ultrasound 1/6/2023 and 12/7/2022  TECHNIQUE: Venous Duplex ultrasound of the left lower extremity with and without compression, augmentation and  duplex. Color flow and spectral Doppler with waveform analysis performed.     FINDINGS: Exam includes the common femoral, femoral, popliteal, and contralateral common femoral veins as well as segmentally visualized deep calf veins and greater saphenous vein. The exam is limited due to the patient's body habitus and leg swelling.     LEFT: No deep vein thrombosis. No superficial thrombophlebitis. No popliteal cyst.        Impression     IMPRESSION:  1.  No deep or superficial venous thrombosis in the left lower extremity. No fluid collection.       Medications:    Aspirin 81 mg  carvedilol   empagliflozin   ezetimibe   famotidine   fenofibrate   fexofenadine   furosemide  icosapent ethyl   insulin aspart   insulin glargine   levothyroxine   minoxidil   nitroGLYcerin   olmesartan   ondansetron   pantoprazole   Vitamin D3     Past Medical History:    Abdominal adhesions  Allergic rhinitis  Alopecia  Anemia  CAD  CKD  Depression  Diabetic gastroparesis  Diabetes type 2  DVT  Fibromyalgia  GERD  Hernia  HTN  Hyperlipidemia  Hypothyroidism  Mumps  ANNETTE  Thyroid carcinoma  Poliomyelitis  PE  Septic joint  Venous insufficiency  Cellulitis    Past Surgical History:    Toe amputation  Appendectomy  Right foot arthrodesis  Rotator cuff repair  Bladder surgery  Botox injection  Breast surgery  Cholecystectomy  Colostomy  Eye surgery  Cystoscopy  Hysterectomy  Hernia repair  Thyroidectomy     Physical Exam   Patient Vitals for the past 24 hrs:   BP Temp Pulse Resp SpO2 Weight   11/16/23 1837 -- -- 66 -- 100 % --   11/16/23 1737 -- -- 57 19 99 % --   11/16/23 1637 -- -- 52 14 98 % --   11/16/23 1557 (!) 153/79 -- 56 30 99 % --   11/16/23 1326 (!) 197/66 98.2  F (36.8  C) 60 20 99 % 94.3 kg (208 lb)        Physical Exam  General: Well-nourished,  appears to be resting comfortably when I enter the room  Eyes: PERRL, conjunctivae pink no scleral icterus or conjunctival injection  ENT:  Moist mucus membranes, posterior oropharynx  clear without erythema or exudates  Respiratory:  Lungs clear to auscultation bilaterally, no crackles/rubs/wheezes.  Good air movement  CV: Normal rate and rhythm, no murmurs/rubs/gallops  GI:  Abdomen soft and non-distended.  Normoactive BS.  No tenderness, guarding or rebound  Skin: Warm, dry.  Shiny red patch over the left lower extremity consistent with venous stasis with small ulcer.  No foul-smelling drainage.  Not particularly warm.  No streaking.  Does not appear consistent with cellulitis.  Musculoskeletal: No peripheral edema or calf tenderness  Neuro: Alert and oriented to person/place/time  Psychiatric: Normal affect        Emergency Department Course   ECG  ECG results from 11/16/23   EKG 12 lead     Value    Systolic Blood Pressure     Diastolic Blood Pressure     Ventricular Rate 59    Atrial Rate 59    UT Interval 152    QRS Duration 88        QTc 401    P Axis 25    R AXIS -4    T Axis 29    Interpretation ECG      Sinus bradycardia  Cannot rule out Anterior infarct , age undetermined  Abnormal ECG  When compared with ECG of 31-AUG-2023 11:46,  No significant change was found        Laboratory:  Labs Ordered and Resulted from Time of ED Arrival to Time of ED Departure   TROPONIN T, HIGH SENSITIVITY - Abnormal       Result Value    Troponin T, High Sensitivity 27 (*)       Emergency Department Course & Assessments:    Interventions:  Medications   aspirin (ASA) chewable tablet 324 mg (324 mg Oral $Given 11/16/23 1343)      Assessments:  1330 Obtained the patients history and performed initial exam  1525 Rechecked the patient    Independent Interpretation (X-rays, CTs, rhythm strip):  None    Consultations/Discussion of Management or Tests:  ED Course as of 11/16/23 1931   Thu Nov 16, 2023   1538 Spoke to cardiology about the patients findings and next steps   1551 I spoke to the hospitalist, Dr. Robles, who has agreed to admit the patient for continued evaluation and treatment.       Social  Determinants of Health affecting care:   None    Disposition:  The patient was admitted to the hospital under the care of Dr. Robles.     Impression & Plan      Medical Decision Making:    HEART Score  Criteria   0-2 points for each of 5 items (maximum of 10 points):  Score 2- History highly suspicious for coronary syndrome  Score 1- EKG with Non-specific repolarization disturbance  Score 2- Age 65 years or older  Score 2- Three or more risk factors for or history of atherosclerotic disease  Score 1- One to 2 times the normal troponin upper limit  Interpretation  Risk of adverse outcome  Heart Score: 8  Total Score 7-10- Adverse Outcome Risk 72.7% - Supports early aggressive management, typically including cardiac catheterization      Chasity Hodgson is a 78 year old female who comes with intermittent chest pain.  High-sensitivity troponin is slightly elevated but is stable over 2 hours.  Chest x-ray was clear.  She had a Lexiscan done earlier this year which was reassuring but given how high risk she is from a cardiac standpoint, we will admit her for provocative testing and cardiology consultation to see if she needs an angiogram.  Her last angiogram was in 2018.  She also has a wound in her left leg that has been painful to her.  This is her chronic wound.  Findings on the skin appear consistent with venous stasis and I do not believe this is infected.  We will continue to monitor her closely.  No other signs of sepsis.  Dr. Robles graciously agreed to admit the patient.  Patient was in agreement as well.    Diagnosis:    ICD-10-CM    1. Acute chest pain  R07.9       2. Venous stasis ulcer of left lower leg with edema of left lower leg (H)  I83.029     I83.892     L97.929     R60.0            Scribe Disclosure:  Obdulio GONZALEZ, am serving as a scribe at 1:31 PM on 11/16/2023 to document services personally performed by Mansi Gonzalez MD based on my observations and the provider's statements to me.     11/16/2023   Carlos  MD Carlos Randall Amy C, MD  11/16/23 6250

## 2023-11-16 NOTE — ED NOTES
[facility]  ED Nurse Handoff Report    ED Chief complaint: Chest Pain      ED Diagnosis:   Final diagnoses:   Acute chest pain   Venous stasis ulcer of left lower leg with edema of left lower leg (H)       Code Status: To be addressed     Allergies:   Allergies   Allergen Reactions    Ketorolac Tromethamine Difficulty breathing     Shortness of breath    Nsaids Difficulty breathing     Increased creatinine    Celecoxib Itching and Rash    Morphine And Related Itching     With higher doses    Crestor [Rosuvastatin] Muscle Pain (Myalgia)    No Clinical Screening - See Comments Itching     Fragrance    Vioxx Other (See Comments)     Heart races    Acetaminophen Itching    Conjugated Estrogens Rash    Sulfa Antibiotics Rash       Patient Story: Patient comes from home where she lives independently for evaluation of chest pain.   Focused Assessment:  Aox4, left sided chest pain, left lower leg cellulitis/ wound  Labs Ordered and Resulted from Time of ED Arrival to Time of ED Departure   TROPONIN T, HIGH SENSITIVITY - Abnormal       Result Value    Troponin T, High Sensitivity 27 (*)          Treatments and/or interventions provided: IV, labs, meds  Patient's response to treatments and/or interventions: fair    To be done/followed up on inpatient unit:  n/a    Does this patient have any cognitive concerns?:  None    Activity level - Baseline/Home:  Independent and Walker  Activity Level - Current:   Independent and Walker    Patient's Preferred language: English   Needed?: No    Isolation: Contact   Infection: MRSA    Patient tested for COVID 19 prior to admission: NO  Bariatric?: No    Vital Signs:   Vitals:    11/16/23 1326   BP: (!) 197/66   Pulse: 60   Resp: 20   Temp: 98.2  F (36.8  C)   SpO2: 99%   Weight: 94.3 kg (208 lb)       Cardiac Rhythm:     Was the PSS-3 completed:   Yes  What interventions are required if any?               Family Comments: Patient able to update family  independently   OBS brochure/video discussed/provided to patient/family: N/A              Name of person given brochure if not patient:               Relationship to patient:     For the majority of the shift this patient's behavior was Green.   Behavioral interventions performed were n/a.    ED NURSE PHONE NUMBER: [phone]

## 2023-11-16 NOTE — PHARMACY-ADMISSION MEDICATION HISTORY
Pharmacist Admission Medication History    Admission medication history is complete. The information provided in this note is only as accurate as the sources available at the time of the update.    Information Source(s): Patient and CareEverywhere/SureScripts via in-person    Pertinent Information: none    Changes made to PTA medication list:  Added: caltrate, biotin, b12  Deleted: None  Changed: vascepa to qpm, lidocaine to 2-3 patches, mvi to daily, retin-a to prn. Tac ointment to not taking       Allergies reviewed with patient and updates made in EHR: yes    Medication History Completed By: Jayna Villanueva RPH 11/16/2023 5:56 PM    PTA Med List   Medication Sig Note Last Dose    acetaminophen (TYLENOL) 500 MG tablet Take 1,000 mg by mouth every 8 hours as needed  11/16/2023 at am    acetaminophen-caffeine (EXCEDRIN TENSION HEADACHE) 500-65 MG TABS Take 2 tablets by mouth every 6 hours as needed for mild pain  Unknown    amoxicillin (AMOXIL) 500 MG capsule Takes 4 caps before every dental appointments.  Unknown    aspirin (ASA) 81 MG EC tablet Take 81 mg by mouth daily  11/15/2023 at 1300    azelastine (ASTEPRO) 0.15 % nasal spray Spray 1 spray into both nostrils daily as needed  Unknown    BIOTIN PO Take 1 capsule by mouth at bedtime Unknown dose  11/15/2023    Calcium Carbonate-Vitamin D (CALTRATE 600+D PO) Take 1 tablet by mouth daily Takes in the afternoon  11/15/2023    carvedilol (COREG) 12.5 MG tablet Take 1 tablet (12.5 mg) by mouth 2 times daily (with meals)  11/16/2023 at am    clotrimazole (LOTRIMIN) 1 % cream Apply topically as needed  Unknown    Cyanocobalamin (B-12 PO) Take 1 tablet by mouth daily Unknown dose. Takes in the afternoon  11/15/2023    empagliflozin (JARDIANCE) 10 MG TABS tablet Take 1 tablet (10 mg) by mouth daily 11/16/2023: Takes after supper 11/15/2023    ezetimibe (ZETIA) 10 MG tablet Take 1 tablet (10 mg) by mouth daily  11/15/2023    famotidine (PEPCID) 20 MG tablet Take 2 tablets  (40 mg) by mouth daily  11/15/2023    fenofibrate (TRICOR) 145 MG tablet Take 1 tablet (145 mg) by mouth daily  11/15/2023    fexofenadine (ALLEGRA) 180 MG tablet Take 180 mg by mouth daily Takes in the afternoon  11/15/2023 at 1300    fluocinolone acetonide (DERMA SMOOTHE/FS BODY) 0.01 % external oil Apply 2 mL to scalp once per week. Massage into scalp. Can be left in overnight or washed out after 4-6 hours.  Past Week    fluticasone (FLONASE) 50 MCG/ACT spray Spray 2 sprays in nostril daily 2 sprays in each nostril qd  11/15/2023    furosemide (LASIX) 20 MG tablet Take 1 tablet (20 mg) by mouth daily as needed (lower extremity edema)  Unknown    icosapent ethyl (VASCEPA) 1 g CAPS capsule TAKE 1 CAPSULE BY MOUTH 2 TIMES DAILY  11/15/2023 at pm    insulin aspart (NOVOLOG FLEXPEN) 100 UNIT/ML pen Inject 10 Units Subcutaneous 2 times daily (with meals) Breakfast and supper, pt eats minimal at lunch  11/15/2023 at pm    insulin glargine (LANTUS VIAL) 100 UNIT/ML vial Inject 30 Units Subcutaneous every morning If BG<150-->takes 12 units (50% of dose) OK to substitute Basaglar.  11/16/2023 at am    levothyroxine (SYNTHROID/LEVOTHROID) 112 MCG tablet Take 1 tablet (112 mcg) by mouth daily  11/16/2023 at am    Lidocaine (LIDOCARE) 4 % Patch Place 2 patches onto the skin every 24 hours To prevent lidocaine toxicity, patient should be patch free for 12 hrs daily. (Patient taking differently: Place 2-3 patches onto the skin every 24 hours To prevent lidocaine toxicity, patient should be patch free for 12 hrs daily.)  11/15/2023 at not on currently    minoxidil (LONITEN) 2.5 MG tablet Take half tablet (1.25 mg) by mouth once daily (Patient taking differently: Take 0.625 mg by mouth daily Take 1/4 tablet (0.625 mg) by mouth once daily)  11/16/2023 at am    MULTIPLE VITAMIN PO Take 1 tablet by mouth daily  11/15/2023    nitroGLYcerin (NITROSTAT) 0.4 MG sublingual tablet Place 1 tablet (0.4 mg) under the tongue every 5 minutes  as needed for chest pain (no more than 3 in one hour; after 3rd, call 911.)  Unknown    nystatin (MYCOSTATIN) cream Apply topically daily as needed (groin)  Unknown    nystatin-triamcinolone (MYCOLOG II) cream Apply topically daily as needed (genital itching)  Unknown    olmesartan (BENICAR) 40 MG tablet Take 1 tablet (40 mg) by mouth daily  11/16/2023 at am    ondansetron (ZOFRAN) 4 MG tablet Take 1 tablet (4 mg) by mouth every 6 hours as needed Reported on 3/20/2017  Unknown    pantoprazole (PROTONIX) 40 MG EC tablet Take 40 mg by mouth 2 times daily  11/16/2023 at am    Polyethylene Glycol 400 (BLINK TEARS) 0.25 % GEL Apply to eye At Bedtime  11/15/2023    polyethylene glycol 400 (BLINK TEARS) 0.25 % SOLN ophthalmic solution Place 1 drop into both eyes daily  11/16/2023    sucralfate (CARAFATE) 1 GM/10ML suspension Take 1 g by mouth 4 times daily as needed (GERD)  Unknown    tretinoin (RETIN-A) 0.025 % external cream Use every night as tolerated - spot treat lesion (Patient taking differently: Apply topically nightly as needed (facial lesions) Use every night as tolerated - spot treat lesion)  More than a month    Vitamin D3 50 mcg (2000 units) tablet Take 1 tablet by mouth daily Takes in the afternoon  11/15/2023 at 1300

## 2023-11-16 NOTE — H&P
[facility]    History and Physical  Hospitalist       Date of Admission:  11/16/2023    Assessment & Plan   Chasity Hodgson is a 78 year old female with PMH of DVT, PE, HTN, CAD, CKD, DM2, hypothyroidism, who presents with atypical chest pain and elevated troponin.    Atypical chest pain  Elevated troponin  Mild CAD  Patient had a cardiac cath in 2018 whish showed minimal noncclusive CAD no greater than 20%. Then in April of 2023, she did have intermittent left sided chest pain, she underwent a Lexiscan stress test which was negative for inducible ischemia. She has several risk factors for CAD.   Patient reports with atypical substernal to slightly left sided chest pain, not clearly exertional, not pleuritic. It has been increasing in frequency from once a month over the summer to 1-2/week in the past month. Troponin is 29-->27 (previously 26-->25 in August 2023), EKG nonischemic.   Overall, it appears that patient's risk of ACS is fairly low. Her recent negative stress test this year and her prior cath is reassuring. However, she is reporting an increase in her chest pain frequency, and she has a stably elevated troponin. I will have Cardiology weigh in on the next step--Lexiscan vs cath vs outpatient monitoring.  - Cardiology consult  - Trend troponin x1  - Tele  - NPO at midnight  - Continue PTA aspirin    Hypertensive urgency: /66 in the ED. Could be an alternative etiology to patient's chest pain, elevated troponins.  - Continue PTA Coreg, olmesartan (-->losartan)  - Hydralazine IV/PO for SBP > 180    Left leg ulcer, erythema  Patient has been following wound clinic for a left leg ulcer. There was some concern for a left leg cellulitis but both the wound clinic nurse and the ED provider felt cellulitis was less likely. Difficult to determine on my exam but it does not appear clearly cellulitic. US left leg no DVT. I think it's reasonable to monitor this and reassess tomorrow.  -  Monitor    DVT, PE: Not on blood thinners currently.    CKD, hypothyroidism  Creatinine at baseline.  - Resume meds pending med rec    DM2: A1c 8.4%, initial BG is 87, appears to be on lantus 30 units PTA (med rec pending)  - Med sliding scale insulin + carb counting  - Resume insulin pending med rec    Clinically Significant Risk Factors Present on Admission                # Drug Induced Platelet Defect: home medication list includes an antiplatelet medication   # Hypertension: Noted on problem list     # DMII: A1C = 8.4 % (Ref range: 0.0 - 5.6 %) within past 6 months   # Severe Obesity: Estimated body mass index is 40.62 kg/m  as calculated from the following:    Height as of an earlier encounter on 11/16/23: 1.524 m (5').    Weight as of this encounter: 94.3 kg (208 lb).               PT/OT: not needed  Diet: Cardiac diet, NPO at midnight  DVT Prophylaxis: Low Risk/Ambulatory with no VTE prophylaxis indicated  Bello Catheter: Not present  Lines: None     Cardiac Monitoring: None  Code Status: Full code. Patient states she doesn't want any interventions that would guarantee a good outcome. I stated that there is no guarantee following a cardiac arrest. Full code for now she will think about it more  Disposition: Anticipated discharge 1-2 days    Jack Robles MD  Hospitalist Service  [facility]  Securely message with [email or web address] (more info)  Text page via [facility] Paging/Directory     Primary Care Physician   Shola Benoit MD    Chief Complaint   Chest pain and left leg pain    History is obtained from the patient  Case discussed with ED provider    History of Present Illness   Chasity Hodgson is a 78 year old female who presents with chest pain and left leg pain.     She was seen in wound clinic today for left leg swelling and pain. She has had a wound in her left shin for about two weeks. The wound clinic did not feel that the left leg had any cellulitis. However the patient  "mentioned chest pain to them and they checked a troponin and it was positive so she was sent to the ED.    Patient reports chest pain for the last six months. She describes it as a \"bad ache\" lasting minutes, usually substernal or slightly left of the sternum. It was occurring about once a month for a few months, then in the past month it was been occurring once to twice a week, so it has been increasing in frequency. There are no clear aggravating factosr, sometimes it occurs at rest other times she is walking. It does seem that resting improves the chest pain. She denies any SOB, nausea, vomiting, palpitations, cough, fevres, chills. Her chest pain currently feels similar to the chest pain she has in April 2023, however at that time the chest pain was further left into her chest.    Past Medical History    I have reviewed this patient's medical history and updated it with pertinent information if needed.   Past Medical History:   Diagnosis Date    Abdominal adhesions 1984, 96,99    s/p lysis    Abdominal adhesions     Acne rosacea     Allergic rhinitis     Allergic rhinitis, cause unspecified     Alopecia     Anemia     CAD (coronary artery disease)     Carotid stenosis     CKD (chronic kidney disease) stage 2, GFR 60-89 ml/min     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)     CPAP (continuous positive airway pressure) dependence     Depression     Diabetic gastroparesis (H)     Diet-controlled type 2 diabetes mellitus (H)     DM2 (diabetes mellitus, type 2) (H)     DVT (deep venous thrombosis) (H)     DVT of axillary vein, acute right (H)     Fibromyalgia     Gastro-oesophageal reflux disease     GERD (gastroesophageal reflux disease)     Hernia of unspecified site of abdominal cavity without mention of obstruction or gangrene     bilateral    Hernia, abdominal     History of blood transfusion     10/ 1980    History of thrombophlebitis     HTN (hypertension)     HTN (hypertension)     Hypertriglyceridemia     Hypertriglyceridemia  "    Hypokalemia     Hypothyroidism     Mumps     Obstructive sleep apnea     ANNETTE (obstructive sleep apnea)     ANNETTE on CPAP     Osteoarthritis of glenohumeral joint     Papillary carcinoma of thyroid (H)     s/p thyroidectomy - Ruegemer    Papillary carcinoma of thyroid (H)     PE (pulmonary embolism)     Poliomyelitis     poor circulation right leg    Poliomyelitis     Postsurgical hypothyroidism     s/p papillary thryoid cancer - Ruegemer    Pulmonary embolism (H)     Pulmonary embolus (H)     Pulmonary nodule     Rosacea     S/P carpal tunnel release     bilateral    S/P hardware removal 01/2014    still with lingering foot pain    S/P shoulder surgery     bilateral    Septic joint (H)     right knee    Septic joint of right knee joint (H)     Venous insufficiency     Venous insufficiency     Venous thrombosis 1999    right axillary vein       Past Surgical History   I have reviewed this patient's surgical history and updated it with pertinent information if needed.  Past Surgical History:   Procedure Laterality Date    AMPUTATE TOE(S)  03/15/2012    Procedure:AMPUTATE TOE(S); Surgeon:RUBEN MOSES; Location: OR    AMPUTATE TOE(S)      APPENDECTOMY  1972    APPENDECTOMY      ARTHRODESIS FOOT  03/15/2012    Procedure:ARTHRODESIS FOOT; RIGHT TRIPLE ARTHRODESIS, FIFTH TOE AMPUTATION, LATERAL LIGAMENT RECONSTRUCTION, TENDON TRANSFER AND RELEASE [MINI C-ARM, ARTHREX 4.5 AND 6.7 STAPLES, BIOCOMPOSITE TENODESIS SCREWS]; Surgeon:RUBEN MOSES; Location: OR    ARTHRODESIS FOOT Right     Right foot triple arthrodesis and removal of hardware    ARTHROSCOPY SHOULDER  06/25/2015    REVISION SUBACROMIAL DECOMPRESSION, EXCISION OF GANGLION CYST, DEBRIDEMENT AND EXCISION OF THE GLENOHUMERAL JOINT GANGLION CYST, CORACOID DECOMPRESSION, POSSIBLE SUBSCAPULARIS REPAIR AND OPEN SUBSCAPULARIS BICEP    ARTHROSCOPY SHOULDER ROTATOR CUFF REPAIR      BIOPSY  Thyroid 2002    BLADDER SURGERY      BOTOX INJECTION  MEDICAL      BREAST SURGERY  Biopsy    CHOLECYSTECTOMY      CHOLECYSTECTOMY      COLONOSCOPY  2018    COLOSTOMY  2012    Procedure:COLOSTOMY; CREATION OF SIGMOID COLOSTOMY AND EXTENSIVE  LYSIS OF ADHESIONS; Surgeon:MONTSERRAT BENDER; Location:[facility] OR    COLOSTOMY      CYSTOSCOPY      CYSTOSCOPY, INJECT BOTOX N/A 2023    Procedure: CYSTOSCOPY, WITH BOTULINUM TOXIN INJECTION;  Surgeon: Mishel Zendejas MD;  Location: [facility] OR    EYE SURGERY      GENITOURINARY SURGERY      GI SURGERY      weakened rectal sphincter with artificial stimulator    HERNIA REPAIR      HYSTERECTOMY TOTAL ABDOMINAL      LAPAROTOMY, LYSIS ADHESIONS, COMBINED  2012    Procedure:COMBINED LAPAROTOMY, LYSIS ADHESIONS; Surgeon:MONTSERRAT BENDER; Location:[facility] OR    RELEASE CARPAL TUNNEL      RELEASE TENDON FOOT  03/15/2012    Procedure:RELEASE TENDON FOOT; Surgeon:RUBEN METZ; Location: OR    REMOVE HARDWARE FOOT  2012    Procedure: REMOVE HARDWARE FOOT;  RIGHT FOOT REMOVAL OF HARDWARE;  Surgeon: Ruben Metz MD;  Location:  OR    SHOULDER SURGERY  2020    LEFT SHOULDER HEMIARHTROPLASTY, BICEP TENODESIS    SOFT TISSUE SURGERY  2020    TENDON RELEASE      foot    THYROIDECTOMY      ZZC FREEING BOWEL ADHESION,ENTEROLYSIS      , ,     ZZC NONSPECIFIC PROCEDURE      throidectomy    ZZC TOTAL ABDOM HYSTERECTOMY      + BSO       Prior to Admission Medications   Prior to Admission Medications   Prescriptions Last Dose Informant Patient Reported? Taking?   Continuous Blood Gluc Sensor (FREESTYLE BRANDY 3 SENSOR) [facility]   No No   Si each every 14 days   Lidocaine (LIDOCARE) 4 % Patch   No No   Sig: Place 2 patches onto the skin every 24 hours To prevent lidocaine toxicity, patient should be patch free for 12 hrs daily.   MULTIPLE VITAMIN PO   Yes No   Sig: Take 1 tablet by mouth   Polyethylene Glycol 400 (BLINK TEARS) 0.25 % GEL   Yes No   Sig: Apply to eye At  Bedtime   Vitamin D3 50 mcg (2000 units) tablet   Yes No   Si tablet daily   acetaminophen (TYLENOL) 500 MG tablet   Yes No   Sig: Take 1,000 mg by mouth every 8 hours as needed   acetaminophen-caffeine (EXCEDRIN TENSION HEADACHE) 500-65 MG TABS   No No   Sig: Take 2 tablets by mouth every 6 hours as needed for mild pain   amoxicillin (AMOXIL) 500 MG capsule   Yes No   Sig: Takes 4 caps before every dental appointments.   aspirin (ASA) 81 MG EC tablet   Yes No   Sig: Take 81 mg by mouth daily   azelastine (ASTEPRO) 0.15 % nasal spray   Yes No   Sig: Spray 1 spray into both nostrils daily as needed   carvedilol (COREG) 12.5 MG tablet   No No   Sig: Take 1 tablet (12.5 mg) by mouth 2 times daily (with meals)   clotrimazole (LOTRIMIN) 1 % cream   Yes No   Sig: Apply topically as needed   empagliflozin (JARDIANCE) 10 MG TABS tablet   No No   Sig: Take 1 tablet (10 mg) by mouth daily   ezetimibe (ZETIA) 10 MG tablet   No No   Sig: Take 1 tablet (10 mg) by mouth daily   famotidine (PEPCID) 20 MG tablet   No No   Sig: Take 2 tablets (40 mg) by mouth daily   fenofibrate (TRICOR) 145 MG tablet   No No   Sig: Take 1 tablet (145 mg) by mouth daily   fexofenadine (ALLEGRA) 180 MG tablet   Yes No   Sig: Take 180 mg by mouth daily.   fluocinolone acetonide (DERMA SMOOTHE/FS BODY) 0.01 % external oil   No No   Sig: Apply 2 mL to scalp once per week. Massage into scalp. Can be left in overnight or washed out after 4-6 hours.   fluticasone (FLONASE) 50 MCG/ACT spray   No No   Sig: Spray 2 sprays in nostril daily 2 sprays in each nostril qd   furosemide (LASIX) 20 MG tablet   No No   Sig: Take 1 tablet (20 mg) by mouth daily as needed (lower extremity edema)   icosapent ethyl (VASCEPA) 1 g CAPS capsule   No No   Sig: TAKE 1 CAPSULE BY MOUTH 2 TIMES DAILY   insulin aspart (NOVOLOG FLEXPEN) 100 UNIT/ML pen   No No   Sig: Inject 10 Units Subcutaneous 2 times daily (with meals) Breakfast and supper, pt eats minimal at lunch   insulin  glargine (LANTUS VIAL) 100 UNIT/ML vial   No No   Sig: Inject 30 Units Subcutaneous every morning If BG<150-->takes 12 units (50% of dose) OK to substitute Basaglar.   insulin pen needle (B-D U/F) 31G X 5 MM miscellaneous   No No   Sig: Use 1 pen needles daily or as directed.   levothyroxine (SYNTHROID/LEVOTHROID) 112 MCG tablet   No No   Sig: Take 1 tablet (112 mcg) by mouth daily   minoxidil (LONITEN) 2.5 MG tablet   No No   Sig: Take half tablet (1.25 mg) by mouth once daily   nitroGLYcerin (NITROSTAT) 0.4 MG sublingual tablet   No No   Sig: Place 1 tablet (0.4 mg) under the tongue every 5 minutes as needed for chest pain (no more than 3 in one hour; after 3rd, call 911.)   nystatin (MYCOSTATIN) cream   Yes No   Sig: Apply topically daily as needed    nystatin-triamcinolone (MYCOLOG II) cream   Yes No   Sig: Apply topically daily as needed    olmesartan (BENICAR) 40 MG tablet   No No   Sig: Take 1 tablet (40 mg) by mouth daily   ondansetron (ZOFRAN) 4 MG tablet   No No   Sig: Take 1 tablet (4 mg) by mouth every 6 hours as needed Reported on 3/20/2017   order for DME   No No   Sig: Equipment being ordered: Compression stockings - Knee High; 20-30 mmHg compression - note would like adhesive band to keep the stocking from sliding down   pantoprazole (PROTONIX) 40 MG EC tablet   Yes No   Sig: Take 40 mg by mouth 2 times daily   polyethylene glycol 400 (BLINK TEARS) 0.25 % SOLN ophthalmic solution   Yes No   Sig: Place 1 drop into both eyes daily   sucralfate (CARAFATE) 1 GM/10ML suspension   Yes No   Sig: Take 1 g by mouth 4 times daily as needed   tretinoin (RETIN-A) 0.025 % external cream   No No   Sig: Use every night as tolerated - spot treat lesion   triamcinolone (KENALOG) 0.1 % external ointment   No No   Sig: Apply topically every other day      Facility-Administered Medications: None     Allergies   Allergies   Allergen Reactions    Ketorolac Tromethamine Difficulty breathing     Shortness of breath     Nsaids Difficulty breathing     Increased creatinine    Celecoxib Itching and Rash    Morphine And Related Itching     With higher doses    Crestor [Rosuvastatin] Muscle Pain (Myalgia)    No Clinical Screening - See Comments Itching     Fragrance    Vioxx Other (See Comments)     Heart races    Acetaminophen Itching    Conjugated Estrogens Rash    Sulfa Antibiotics Rash       Social History   I have reviewed this patient's social history and updated it with pertinent information if needed. Chasity Hodgson  reports that she has never smoked. She has never used smokeless tobacco. She reports that she does not drink alcohol and does not use drugs.    Family History   I have reviewed this patient's family history and updated it with pertinent information if needed.   Family History   Problem Relation Age of Onset    Arthritis Mother     Hypertension Mother     Cerebrovascular Disease Mother     Obesity Mother     Heart Disease Mother         MI's    Hypertension Father     Respiratory Father         Adult RDS    Diabetes Father     Arthritis Sister     Cancer Sister     Diabetes Sister     Hypertension Sister     Breast Cancer Sister     Depression Sister     Thyroid Disease Sister     Obesity Sister     Arthritis Sister     Hypertension Sister     Thyroid Disease Sister     Osteoporosis Sister     Obesity Sister     Cancer Sister         colon polup    Hypertension Sister     Osteoporosis Sister     Obesity Sister     Colon Cancer Sister     Lipids Sister     Obesity Sister     Obesity Maternal Grandmother         Dad s mother    Skin Cancer Maternal Grandmother         skin cancer unknown    Cancer Maternal Grandmother         unknown skin cancer on face    Obesity Paternal Grandmother         Mothers mother    Heart Disease Brother         MI at 54    Other Cancer Brother         Lung & bone    Lipids Brother     Hypertension Brother     Diabetes Brother     Hyperlipidemia Brother     Ovarian Cancer No family hx of         Review of Systems   The 10 point Review of Systems is negative other than noted in the HPI or here.    Physical Exam   Temp: 98.2  F (36.8  C)   BP: (!) 197/66 Pulse: 60   Resp: 20 SpO2: 99 %      Vital Signs with Ranges  Temp:  [98.2  F (36.8  C)-98.6  F (37  C)] 98.2  F (36.8  C)  Pulse:  [56-60] 60  Resp:  [20] 20  BP: (120-197)/(66-80) 197/66  SpO2:  [99 %-100 %] 99 %  208 lbs 0 oz    Constitutional: Female in NAD  Eyes: PERRL, nonicteric, normal ocular movements  HEENT: Normocephalic, atraumatic, oral mucosa moist  Respiratory: CTAB, no wheezing or crackles  Cardiovascular: RRR, normal S1/2, no m/r/g  GI: No organomegaly, normoactive bowel sounds, nontender, nondistended  Vascular: Bilateral 1+ lower extremity pitting edema  Skin:  BLE venous stasis; Bilateral shins mildly erythematous and warm, left side has dressings covering an ulcer  Musculoskeletal: Normal strength in UE and LE, moves all extremities  Neurologic: A&Ox3  Psychiatric: Appropriate affect and mood    Data   Data reviewed today:  I personally reviewed labwork, EKG.  Recent Labs   Lab 11/16/23  1129   WBC 6.7   HGB 10.8*   MCV 95         POTASSIUM 4.7   CHLORIDE 109*   CO2 25   BUN 27.4*   CR 1.26*   ANIONGAP 8   RINKU 9.2   GLC 87   ALBUMIN 3.9   PROTTOTAL 6.7   BILITOTAL 0.2   ALKPHOS 47   ALT 15   AST 31       Imaging:  Recent Results (from the past 24 hour(s))   US Lower Extremity Venous Duplex Left    Narrative    EXAM: US LOWER EXTREMITY VENOUS DUPLEX LEFT  LOCATION: [facility]  DATE: 11/16/2023    INDICATION: Left leg swelling and redness; ? bumped it during [employer] ride and scraped her skin; serous leakage since  COMPARISON: Ultrasound 1/6/2023 and 12/7/2022  TECHNIQUE: Venous Duplex ultrasound of the left lower extremity with and without compression, augmentation and duplex. Color flow and spectral Doppler with waveform analysis performed.    FINDINGS: Exam includes the common femoral,  femoral, popliteal, and contralateral common femoral veins as well as segmentally visualized deep calf veins and greater saphenous vein. The exam is limited due to the patient's body habitus and leg swelling.    LEFT: No deep vein thrombosis. No superficial thrombophlebitis. No popliteal cyst.      Impression    IMPRESSION:  1.  No deep or superficial venous thrombosis in the left lower extremity. No fluid collection.   XR Chest 2 Views    Narrative    XR CHEST 2 VIEWS  11/16/2023 11:58 AM       INDICATION: Left leg swelling and redness; ? bumped it during [employer] ride and scraped her skin; serous leakage since 2 weeks ago;  also has chest pain X 1 hour last night  COMPARISON: 5/26/2023       Impression    IMPRESSION: Bilateral shoulder arthroplasties. Otherwise negative  chest. The lungs are clear. No change from previous.    TRISTEN GONZALEZ MD         SYSTEM ID:  [id number]
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
## 2023-11-17 ENCOUNTER — APPOINTMENT (OUTPATIENT)
Dept: ULTRASOUND IMAGING | Facility: CLINIC | Age: 78
End: 2023-11-17
Attending: INTERNAL MEDICINE
Payer: MEDICARE

## 2023-11-17 ENCOUNTER — APPOINTMENT (OUTPATIENT)
Dept: CARDIOLOGY | Facility: CLINIC | Age: 78
End: 2023-11-17
Attending: INTERNAL MEDICINE
Payer: MEDICARE

## 2023-11-17 VITALS
HEART RATE: 72 BPM | SYSTOLIC BLOOD PRESSURE: 146 MMHG | WEIGHT: 203 LBS | TEMPERATURE: 97 F | DIASTOLIC BLOOD PRESSURE: 61 MMHG | BODY MASS INDEX: 39.65 KG/M2 | OXYGEN SATURATION: 97 % | RESPIRATION RATE: 12 BRPM

## 2023-11-17 LAB
ACANTHOCYTES BLD QL SMEAR: ABNORMAL
ANION GAP SERPL CALCULATED.3IONS-SCNC: 14 MMOL/L (ref 7–15)
AUER BODIES BLD QL SMEAR: ABNORMAL
BASO STIPL BLD QL SMEAR: ABNORMAL
BITE CELLS BLD QL SMEAR: ABNORMAL
BLISTER CELLS BLD QL SMEAR: ABNORMAL
BUN SERPL-MCNC: 23.8 MG/DL (ref 8–23)
BURR CELLS BLD QL SMEAR: ABNORMAL
CALCIUM SERPL-MCNC: 9 MG/DL (ref 8.8–10.2)
CHLORIDE SERPL-SCNC: 106 MMOL/L (ref 98–107)
CREAT SERPL-MCNC: 1.08 MG/DL (ref 0.51–0.95)
DACRYOCYTES BLD QL SMEAR: ABNORMAL
DEPRECATED HCO3 PLAS-SCNC: 20 MMOL/L (ref 22–29)
EGFRCR SERPLBLD CKD-EPI 2021: 53 ML/MIN/1.73M2
ELLIPTOCYTES BLD QL SMEAR: ABNORMAL
ERYTHROCYTE [DISTWIDTH] IN BLOOD BY AUTOMATED COUNT: 12.2 % (ref 10–15)
FRAGMENTS BLD QL SMEAR: ABNORMAL
GLUCOSE BLDC GLUCOMTR-MCNC: 103 MG/DL (ref 70–99)
GLUCOSE BLDC GLUCOMTR-MCNC: 115 MG/DL (ref 70–99)
GLUCOSE BLDC GLUCOMTR-MCNC: 90 MG/DL (ref 70–99)
GLUCOSE SERPL-MCNC: 108 MG/DL (ref 70–99)
HCT VFR BLD AUTO: 31.8 % (ref 35–47)
HGB BLD-MCNC: 10.8 G/DL (ref 11.7–15.7)
HGB C CRYSTALS: ABNORMAL
HOWELL-JOLLY BOD BLD QL SMEAR: ABNORMAL
LVEF ECHO: NORMAL
MCH RBC QN AUTO: 30.7 PG (ref 26.5–33)
MCHC RBC AUTO-ENTMCNC: 34 G/DL (ref 31.5–36.5)
MCV RBC AUTO: 90 FL (ref 78–100)
NEUTS HYPERSEG BLD QL SMEAR: ABNORMAL
PLAT MORPH BLD: ABNORMAL
PLATELET # BLD AUTO: ABNORMAL 10*3/UL
POLYCHROMASIA BLD QL SMEAR: ABNORMAL
POTASSIUM SERPL-SCNC: 4.2 MMOL/L (ref 3.4–5.3)
RBC # BLD AUTO: 3.52 10E6/UL (ref 3.8–5.2)
RBC AGGLUT BLD QL: ABNORMAL
RBC MORPH BLD: ABNORMAL
ROULEAUX BLD QL SMEAR: ABNORMAL
SICKLE CELLS BLD QL SMEAR: ABNORMAL
SMUDGE CELLS BLD QL SMEAR: ABNORMAL
SODIUM SERPL-SCNC: 140 MMOL/L (ref 135–145)
SPHEROCYTES BLD QL SMEAR: ABNORMAL
STOMATOCYTES BLD QL SMEAR: ABNORMAL
TARGETS BLD QL SMEAR: ABNORMAL
TOXIC GRANULES BLD QL SMEAR: ABNORMAL
VARIANT LYMPHS BLD QL SMEAR: ABNORMAL
WBC # BLD AUTO: 6.1 10E3/UL (ref 4–11)

## 2023-11-17 PROCEDURE — 999N000054 HC STATISTIC EKG NON-CHARGEABLE

## 2023-11-17 PROCEDURE — 250N000011 HC RX IP 250 OP 636: Performed by: INTERNAL MEDICINE

## 2023-11-17 PROCEDURE — 93321 DOPPLER ECHO F-UP/LMTD STD: CPT | Mod: 26 | Performed by: INTERNAL MEDICINE

## 2023-11-17 PROCEDURE — 96375 TX/PRO/DX INJ NEW DRUG ADDON: CPT | Mod: XU

## 2023-11-17 PROCEDURE — 250N000013 HC RX MED GY IP 250 OP 250 PS 637: Performed by: INTERNAL MEDICINE

## 2023-11-17 PROCEDURE — 99152 MOD SED SAME PHYS/QHP 5/>YRS: CPT | Performed by: INTERNAL MEDICINE

## 2023-11-17 PROCEDURE — 93325 DOPPLER ECHO COLOR FLOW MAPG: CPT | Mod: 26 | Performed by: INTERNAL MEDICINE

## 2023-11-17 PROCEDURE — 99223 1ST HOSP IP/OBS HIGH 75: CPT | Mod: 25 | Performed by: INTERNAL MEDICINE

## 2023-11-17 PROCEDURE — 250N000011 HC RX IP 250 OP 636: Mod: JZ | Performed by: INTERNAL MEDICINE

## 2023-11-17 PROCEDURE — 250N000013 HC RX MED GY IP 250 OP 250 PS 637: Performed by: STUDENT IN AN ORGANIZED HEALTH CARE EDUCATION/TRAINING PROGRAM

## 2023-11-17 PROCEDURE — 82962 GLUCOSE BLOOD TEST: CPT

## 2023-11-17 PROCEDURE — 36252 INS CATH REN ART 1ST BILAT: CPT | Performed by: INTERNAL MEDICINE

## 2023-11-17 PROCEDURE — 93321 DOPPLER ECHO F-UP/LMTD STD: CPT

## 2023-11-17 PROCEDURE — 250N000012 HC RX MED GY IP 250 OP 636 PS 637: Performed by: INTERNAL MEDICINE

## 2023-11-17 PROCEDURE — 93005 ELECTROCARDIOGRAM TRACING: CPT

## 2023-11-17 PROCEDURE — 93308 TTE F-UP OR LMTD: CPT | Mod: 26 | Performed by: INTERNAL MEDICINE

## 2023-11-17 PROCEDURE — G0378 HOSPITAL OBSERVATION PER HR: HCPCS

## 2023-11-17 PROCEDURE — 93926 LOWER EXTREMITY STUDY: CPT

## 2023-11-17 PROCEDURE — 36252 INS CATH REN ART 1ST BILAT: CPT | Mod: GC | Performed by: INTERNAL MEDICINE

## 2023-11-17 PROCEDURE — 36415 COLL VENOUS BLD VENIPUNCTURE: CPT | Performed by: INTERNAL MEDICINE

## 2023-11-17 PROCEDURE — 999N000208 ECHOCARDIOGRAM LIMITED

## 2023-11-17 PROCEDURE — 250N000013 HC RX MED GY IP 250 OP 250 PS 637: Performed by: NURSE PRACTITIONER

## 2023-11-17 PROCEDURE — C1760 CLOSURE DEV, VASC: HCPCS | Performed by: INTERNAL MEDICINE

## 2023-11-17 PROCEDURE — 272N000001 HC OR GENERAL SUPPLY STERILE: Performed by: INTERNAL MEDICINE

## 2023-11-17 PROCEDURE — 250N000009 HC RX 250: Performed by: INTERNAL MEDICINE

## 2023-11-17 PROCEDURE — 99152 MOD SED SAME PHYS/QHP 5/>YRS: CPT | Mod: GC | Performed by: INTERNAL MEDICINE

## 2023-11-17 PROCEDURE — 85041 AUTOMATED RBC COUNT: CPT | Performed by: INTERNAL MEDICINE

## 2023-11-17 PROCEDURE — 80048 BASIC METABOLIC PNL TOTAL CA: CPT | Performed by: INTERNAL MEDICINE

## 2023-11-17 PROCEDURE — 93454 CORONARY ARTERY ANGIO S&I: CPT | Mod: 26 | Performed by: INTERNAL MEDICINE

## 2023-11-17 PROCEDURE — C1894 INTRO/SHEATH, NON-LASER: HCPCS | Performed by: INTERNAL MEDICINE

## 2023-11-17 PROCEDURE — C1769 GUIDE WIRE: HCPCS | Performed by: INTERNAL MEDICINE

## 2023-11-17 PROCEDURE — 93010 ELECTROCARDIOGRAM REPORT: CPT | Performed by: INTERNAL MEDICINE

## 2023-11-17 PROCEDURE — 96361 HYDRATE IV INFUSION ADD-ON: CPT

## 2023-11-17 PROCEDURE — 99153 MOD SED SAME PHYS/QHP EA: CPT | Performed by: INTERNAL MEDICINE

## 2023-11-17 PROCEDURE — 258N000003 HC RX IP 258 OP 636: Performed by: NURSE PRACTITIONER

## 2023-11-17 PROCEDURE — 255N000002 HC RX 255 OP 636: Performed by: INTERNAL MEDICINE

## 2023-11-17 PROCEDURE — 96374 THER/PROPH/DIAG INJ IV PUSH: CPT

## 2023-11-17 PROCEDURE — 93454 CORONARY ARTERY ANGIO S&I: CPT | Performed by: INTERNAL MEDICINE

## 2023-11-17 PROCEDURE — 99239 HOSP IP/OBS DSCHRG MGMT >30: CPT | Performed by: STUDENT IN AN ORGANIZED HEALTH CARE EDUCATION/TRAINING PROGRAM

## 2023-11-17 DEVICE — CLOSURE ANGIOSEAL 6FR 610130: Type: IMPLANTABLE DEVICE | Status: FUNCTIONAL

## 2023-11-17 RX ORDER — FENTANYL CITRATE 50 UG/ML
INJECTION, SOLUTION INTRAMUSCULAR; INTRAVENOUS
Status: DISCONTINUED | OUTPATIENT
Start: 2023-11-17 | End: 2023-11-17 | Stop reason: HOSPADM

## 2023-11-17 RX ORDER — LIDOCAINE 4 G/G
2 PATCH TOPICAL
Status: DISCONTINUED | OUTPATIENT
Start: 2023-11-17 | End: 2023-11-17 | Stop reason: HOSPADM

## 2023-11-17 RX ORDER — LIDOCAINE 40 MG/G
CREAM TOPICAL
Status: DISCONTINUED | OUTPATIENT
Start: 2023-11-17 | End: 2023-11-17

## 2023-11-17 RX ORDER — IOPAMIDOL 755 MG/ML
INJECTION, SOLUTION INTRAVASCULAR
Status: DISCONTINUED | OUTPATIENT
Start: 2023-11-17 | End: 2023-11-17 | Stop reason: HOSPADM

## 2023-11-17 RX ORDER — FENTANYL CITRATE 50 UG/ML
25 INJECTION, SOLUTION INTRAMUSCULAR; INTRAVENOUS ONCE
Status: COMPLETED | OUTPATIENT
Start: 2023-11-17 | End: 2023-11-17

## 2023-11-17 RX ORDER — ASPIRIN 325 MG
325 TABLET ORAL ONCE
Status: COMPLETED | OUTPATIENT
Start: 2023-11-17 | End: 2023-11-17

## 2023-11-17 RX ORDER — ACETAMINOPHEN 500 MG
1000 TABLET ORAL ONCE
Status: COMPLETED | OUTPATIENT
Start: 2023-11-17 | End: 2023-11-17

## 2023-11-17 RX ORDER — LIDOCAINE 40 MG/G
CREAM TOPICAL
Status: DISCONTINUED | OUTPATIENT
Start: 2023-11-17 | End: 2023-11-17 | Stop reason: HOSPADM

## 2023-11-17 RX ORDER — AMLODIPINE BESYLATE 5 MG/1
5 TABLET ORAL DAILY
Qty: 30 TABLET | Refills: 3 | Status: SHIPPED | OUTPATIENT
Start: 2023-11-17 | End: 2023-11-21

## 2023-11-17 RX ORDER — NALOXONE HYDROCHLORIDE 0.4 MG/ML
0.2 INJECTION, SOLUTION INTRAMUSCULAR; INTRAVENOUS; SUBCUTANEOUS
Status: DISCONTINUED | OUTPATIENT
Start: 2023-11-17 | End: 2023-11-17 | Stop reason: HOSPADM

## 2023-11-17 RX ORDER — HYDRALAZINE HYDROCHLORIDE 20 MG/ML
10-20 INJECTION INTRAMUSCULAR; INTRAVENOUS EVERY 4 HOURS PRN
Status: DISCONTINUED | OUTPATIENT
Start: 2023-11-17 | End: 2023-11-17 | Stop reason: HOSPADM

## 2023-11-17 RX ORDER — ASPIRIN 81 MG/1
243 TABLET, CHEWABLE ORAL ONCE
Status: COMPLETED | OUTPATIENT
Start: 2023-11-17 | End: 2023-11-17

## 2023-11-17 RX ORDER — ACETAMINOPHEN 650 MG/1
650 SUPPOSITORY RECTAL EVERY 4 HOURS PRN
Status: DISCONTINUED | OUTPATIENT
Start: 2023-11-17 | End: 2023-11-17 | Stop reason: HOSPADM

## 2023-11-17 RX ORDER — NALOXONE HYDROCHLORIDE 0.4 MG/ML
0.4 INJECTION, SOLUTION INTRAMUSCULAR; INTRAVENOUS; SUBCUTANEOUS
Status: DISCONTINUED | OUTPATIENT
Start: 2023-11-17 | End: 2023-11-17 | Stop reason: HOSPADM

## 2023-11-17 RX ORDER — HYDRALAZINE HYDROCHLORIDE 20 MG/ML
INJECTION INTRAMUSCULAR; INTRAVENOUS
Status: DISCONTINUED | OUTPATIENT
Start: 2023-11-17 | End: 2023-11-17 | Stop reason: HOSPADM

## 2023-11-17 RX ORDER — NITROGLYCERIN 0.4 MG/1
0.4 TABLET SUBLINGUAL EVERY 5 MIN PRN
Status: DISCONTINUED | OUTPATIENT
Start: 2023-11-17 | End: 2023-11-17 | Stop reason: HOSPADM

## 2023-11-17 RX ORDER — AMLODIPINE BESYLATE 5 MG/1
5 TABLET ORAL DAILY
Status: DISCONTINUED | OUTPATIENT
Start: 2023-11-17 | End: 2023-11-17 | Stop reason: HOSPADM

## 2023-11-17 RX ORDER — LORAZEPAM 2 MG/ML
0.5 INJECTION INTRAMUSCULAR
Status: DISCONTINUED | OUTPATIENT
Start: 2023-11-17 | End: 2023-11-17 | Stop reason: HOSPADM

## 2023-11-17 RX ORDER — LORAZEPAM 0.5 MG/1
0.5 TABLET ORAL
Status: DISCONTINUED | OUTPATIENT
Start: 2023-11-17 | End: 2023-11-17 | Stop reason: HOSPADM

## 2023-11-17 RX ORDER — POTASSIUM CHLORIDE 1500 MG/1
20 TABLET, EXTENDED RELEASE ORAL
Status: DISCONTINUED | OUTPATIENT
Start: 2023-11-17 | End: 2023-11-17 | Stop reason: HOSPADM

## 2023-11-17 RX ORDER — ACETAMINOPHEN 325 MG/1
650 TABLET ORAL EVERY 4 HOURS PRN
Status: DISCONTINUED | OUTPATIENT
Start: 2023-11-17 | End: 2023-11-17 | Stop reason: HOSPADM

## 2023-11-17 RX ORDER — SODIUM CHLORIDE 9 MG/ML
INJECTION, SOLUTION INTRAVENOUS CONTINUOUS
Status: DISCONTINUED | OUTPATIENT
Start: 2023-11-17 | End: 2023-11-17 | Stop reason: HOSPADM

## 2023-11-17 RX ORDER — FENTANYL CITRATE 50 UG/ML
INJECTION, SOLUTION INTRAMUSCULAR; INTRAVENOUS
Status: DISCONTINUED
Start: 2023-11-17 | End: 2023-11-17 | Stop reason: HOSPADM

## 2023-11-17 RX ADMIN — LEVOTHYROXINE SODIUM 112 MCG: 112 TABLET ORAL at 06:38

## 2023-11-17 RX ADMIN — AMLODIPINE BESYLATE 5 MG: 5 TABLET ORAL at 16:41

## 2023-11-17 RX ADMIN — ASPIRIN 81 MG: 81 TABLET, COATED ORAL at 08:41

## 2023-11-17 RX ADMIN — CARVEDILOL 12.5 MG: 12.5 TABLET, FILM COATED ORAL at 08:41

## 2023-11-17 RX ADMIN — HYDRALAZINE HYDROCHLORIDE 10 MG: 20 INJECTION INTRAMUSCULAR; INTRAVENOUS at 14:37

## 2023-11-17 RX ADMIN — HUMAN ALBUMIN MICROSPHERES AND PERFLUTREN 3 ML: 10; .22 INJECTION, SOLUTION INTRAVENOUS at 11:47

## 2023-11-17 RX ADMIN — LIDOCAINE 2 PATCH: 4 PATCH TOPICAL at 08:43

## 2023-11-17 RX ADMIN — PANTOPRAZOLE SODIUM 40 MG: 40 TABLET, DELAYED RELEASE ORAL at 08:41

## 2023-11-17 RX ADMIN — INSULIN GLARGINE 12 UNITS: 100 INJECTION, SOLUTION SUBCUTANEOUS at 08:51

## 2023-11-17 RX ADMIN — CARVEDILOL 12.5 MG: 12.5 TABLET, FILM COATED ORAL at 17:26

## 2023-11-17 RX ADMIN — ASPIRIN 81 MG CHEWABLE TABLET 243 MG: 81 TABLET CHEWABLE at 11:02

## 2023-11-17 RX ADMIN — ACETAMINOPHEN 650 MG: 325 TABLET, FILM COATED ORAL at 14:31

## 2023-11-17 RX ADMIN — LOSARTAN POTASSIUM 100 MG: 100 TABLET, FILM COATED ORAL at 08:41

## 2023-11-17 RX ADMIN — ACETAMINOPHEN 1000 MG: 500 TABLET, FILM COATED ORAL at 06:38

## 2023-11-17 RX ADMIN — SODIUM CHLORIDE: 9 INJECTION, SOLUTION INTRAVENOUS at 11:02

## 2023-11-17 RX ADMIN — FENTANYL CITRATE 25 MCG: 50 INJECTION, SOLUTION INTRAMUSCULAR; INTRAVENOUS at 14:35

## 2023-11-17 RX ADMIN — HYDRALAZINE HYDROCHLORIDE 10 MG: 20 INJECTION INTRAMUSCULAR; INTRAVENOUS at 05:38

## 2023-11-17 ASSESSMENT — ACTIVITIES OF DAILY LIVING (ADL)
DEPENDENT_IADLS:: TRANSPORTATION
ADLS_ACUITY_SCORE: 29

## 2023-11-17 NOTE — DISCHARGE INSTRUCTIONS
Cardiac Angiogram Discharge Instructions - Femoral    After you go home:    Have an adult stay with you until tomorrow.  Drink extra fluids for 2 days.  You may resume your normal diet.  No smoking       For 24 hours - due to the sedation you received:  Relax and take it easy.  Do NOT make any important or legal decisions.  Do NOT drive or operate machines at home or at work.  Do NOT drink alcohol.    Care of Groin Puncture Site:    For the first 24 hrs - check the puncture site every 1-2 hours while awake.  For 2 days, when you cough, sneeze, laugh or move your bowels, hold your hand over the puncture site and press firmly.  Remove the bandaid after 24 hours. If there is minor oozing, apply another bandaid and remove it after 12 hours.  It is normal to have a small bruise or pea size lump at the site.  You may shower tomorrow. Do NOT take a bath, or use a hot tub or pool for at least 3 days. Do NOT scrub the site. Do not use lotion or powder near the puncture site.    Activity:            For 2 days:  No stooping or squatting  Do NOT do any heavy activity such as exercise, lifting, or straining.   No housework, yard work or any activity that make you sweat  Do NOT lift more than 10 pounds    Bleeding:    If you start bleeding from the site in your groin, lie down flat and press firmly on/above the site for 10 minutes.   Once bleeding stops, lay flat for 2 hours.   Call [facility] as soon as you can.       Call 911 right away if you have heavy bleeding or bleeding that does not stop.      Medicines:    If you are taking an antiplatelet medication such as Plavix, Brilinta or Effient, do not stop taking it until you talk to your cardiologist.    If you are on Metformin (Glucophage), do not restart it until you have blood tests (within 2 to 3 days after discharge).  After you have your blood drawn, you may restart the Metformin.   Take your medications, including blood thinners, unless your provider tells you not to.     If you take Coumadin (Warfarin), have your INR checked by your provider in  3-5 days. Call your clinic to schedule this.  If you have stopped any medicines, check with your provider about when to restart them.    Follow Up Appointments:    Follow up with [facility] Nurse Practitioner at [facility] of patient preference in 7-10 days.    Call the clinic if:    You have increased pain or a large or growing hard lump around the site.  The site is red, swollen, hot or tender.  Blood or fluid is draining from the site.  You have chills or a fever greater than 101 F (38 C).  Your leg feels numb, cool or changes color.  You have hives, a rash or unusual itching.  New pain in the back or belly that you cannot control with Tylenol.  Any questions or concerns.          [facility] [place]:    [phone] [facility] (7 days a week)

## 2023-11-17 NOTE — PROGRESS NOTES
[facility]    Patient is currently receiving services with [facility]. The patient is currently receiving Skilled Nursing services. Patient's  and home health team have been notified that patient is under observation status. [facility] Liaison will continue to follow patient during stay. If patient is admitted to inpatient status, please provide orders to resume home care at time of discharge if appropriate.

## 2023-11-17 NOTE — PLAN OF CARE
Pt arrived to floor from ED at 8:30pm. A/Ox4. Bradycardic, and hypertensive. On RA. Tele SB. Denied chest pain. Does have pain in LLE LS clear. +bs, +flatus, small amount in stool. Pt has colostomy. Voiding adequately. Dressing to LLE wound cdi. Up with assist x1 gb and walker.

## 2023-11-17 NOTE — PROGRESS NOTES
Cardiology    Called re: acute pain in right groin.  There is no hematoma. The is a bruit.  BP is elevated.    Holding manual pressure  Hydralazine 10mg IV given  Amlodipine 5mg PO was previously ordered and will be given.  Stat RLE arterial US ordered. Concern for pseudoaneurysm.      Paulina Ely MD on 11/17/2023 at 2:48 PM    Addendum: no pseudoaneurysm on ultrasound.  Please maintain SBP <150mmHg.  I have updated hydralazine PRN order to 10-20mg IV Q4hrs for SBP >150mmHg.   Cardiology signing off.    Paulina Ely MD on 11/17/2023 at 3:57 PM

## 2023-11-17 NOTE — PLAN OF CARE
Goal Outcome Evaluation:  A&O x4, VSS on RA, c/o chest, shoulder and back. Obtained stat EKG, no changes compare to yesterday. Repositioned in bed, reported pain much better after tylenol, and no CP. Denies SOB. NPO ex meds. Plans for cardiology consult. Will continue to monitor closely.

## 2023-11-17 NOTE — PROVIDER NOTIFICATION
MD Notification    Notified Person: MD    Notified Person Name: Dr. Robles     Notification Date/Time: 11/16/23 at 2101    Notification Interaction: Vocera    Purpose of Notification: Do you want hold parameters for carvedilol? Pt's heart rate is maintaining 55bpm.    Orders Received: Hold for heart rate under 50

## 2023-11-17 NOTE — PROGRESS NOTES
RECEIVING UNIT ED HANDOFF REVIEW    ED Nurse Handoff Report was reviewed by: Adriana Barksdale RN on November 16, 2023 at 8:07 PM

## 2023-11-17 NOTE — DISCHARGE SUMMARY
[facility]  Hospitalist Discharge Summary      Date of Admission:  11/16/2023  Date of Discharge:  11/17/2023  Discharging Provider: Micha Martin MD  Discharge Service: Hospitalist Service    Discharge Diagnoses   Minimally elevated troponin, likely due to demand ischemia from hypertension  History of typical anginal chest pain and chest pain with atypical symptoms.  Mild nonobstructive coronary artery disease   Resistant hypertension likely contributing to exertional symptoms  Mild anemia, stable  Left lower extremity wound  CKD 3   Dyslipidemia with statin intolerance on zetia and fenofibrate  Post-polio syndrome  History of DVT and PE  Hypothyroidism  DMT2    Clinically Significant Risk Factors     # DMII: A1C = 8.4 % (Ref range: 0.0 - 5.6 %) within past 6 months  # Obesity: Estimated body mass index is 39.65 kg/m  as calculated from the following:    Height as of an earlier encounter on 11/16/23: 1.524 m (5').    Weight as of this encounter: 92.1 kg (203 lb).       Follow-ups Needed After Discharge   Follow-up Appointments     Follow-up and recommended labs and tests       Follow up with primary care provider, Shola Benoit MD, within   7 days for hospital follow- up.  No follow up labs or test are needed.    Follow up with cardiology as scheduled.            Unresulted Labs Ordered in the Past 30 Days of this Admission       No orders found for last 31 day(s).        These results will be followed up by n/a    Discharge Disposition   Discharged to home  Condition at discharge: Stable    Hospital Course   Chasity Hodgson is a 78 year old female with PMH of DVT, PE, HTN, CAD, CKD, DM2, hypothyroidism, who presents with atypical chest pain and elevated troponin.  She was seen by cardiology who felt coronary angiography was warranted.  Coronary angiogram demonstrated mild nonobstructive coronary disease.  She was started on amlodipine 5 mg daily in addition to her prior  antihypertensive regimen.  She will discharge home.    Consultations This Hospital Stay   CARDIOLOGY IP CONSULT  CARE MANAGEMENT / SOCIAL WORK IP CONSULT  SOCIAL WORK IP CONSULT  PHARMACY IP CONSULT    Code Status   Full Code    Time Spent on this Encounter   I, Micha Martin MD, personally saw the patient today and spent greater than 30 minutes discharging this patient.       Micha Martin MD  [facility]  [address] [place], [place] [address], [address]  [place] [address]  Phone: [phone]  ______________________________________________________________________    Physical Exam   Vital Signs: Temp: 97  F (36.1  C) Temp src: Oral BP: (!) 146/61 Pulse: 72   Resp: 12 SpO2: 97 % O2 Device: None (Room air)    Weight: 203 lbs 0 oz  Constitutional: Awake, alert, cooperative, no apparent distress  Respiratory: Clear to auscultation bilaterally, no crackles or wheezing  Cardiovascular: Regular rate and rhythm, normal S1 and S2, and no murmur noted  GI: Normal bowel sounds, soft, non-distended, non-tender  Skin/Integumen: No rashes, no cyanosis, no edema  Other:          Primary Care Physician   Shola Benoit MD    Discharge Orders      Brief Discharge Instructions    Do NOT stop your aspirin or platelet inhibitor unless directed by your Cardiologist.  These medications help to prevent platelets in your blood from sticking together and forming a clot.  Examples of these medications are:  Ticagrelor (Brilinta), Clopidigrel (Plavix), Prasugrel (Effient)     When to call - Contact the Heart Clinic    You may experience symptoms that require follow-up before your scheduled appointment. Contact the Heart Clinic if you develop: Fever over 100.4o Fahrenheit, that lasts more than one day; Redness, heat, or pus at the puncture site; Change in color or temperature in your groin or leg.     When to call - Reasons to Call 911    If your groin starts to bleed or begins to swell suddenly after  leaving the hospital, lie flat and apply firm pressure just above the puncture site for 15 minutes.  If bleeding continues, call 9-1-1.     Precautions - Lifting    NO lifting of more than 10 pounds for at least 3 days.  If you usually lift 50 pounds or more daily, talk with your Cardiologist.     Precautions - Household Activities    Avoid any hard work or tiring activities.  NO physical activity such as mowing the lawn, raking, vacuuming, changing sheets on your bed, snow shoveling, or using a .     Precautions - Active Sports Activities    Avoid any tiring sports activities.  This includes, yard work, jogging, biking, bowling, swimming, tennis or golf, and sexual activity.     Precautions - Elective Dental Work    NO elective dental work for 6 weeks after receiving a stent.     Comfort and Pain Management - Pain after Surgery    Pain after surgery is normal and expected.  Your leg may be sore or stiff for a few days, and your pain will improve with time. You may take Tylenol or a pain medicine recommended by your Cardiologist.     Comfort and Pain Management - Bruising after Surgery    Bruising around the groin area is normal.  It may take 2-3 weeks for this to go away.  It is normal for the bruised area to turn green and/or yellow as it is healing.  A small lump may also be present and may last 2-3 months.     Activity - Daily Walking    During the day get up and walk around every 2 hours.     Activity - Light Household Activities    Light household activities are ok.     Activity - Elevate Legs    Elevate legs in between all activities.     Activity - Cardiac Rehab    You are encouraged to enroll in an Outpatient Cardiac Rehab program after discharge from the hospital.  Our Cardiac Rehab staff may visit briefly with you while you're in the hospital.  If they miss you, someone will contact you after you are home.     Return to Driving    Driving is NOT permitted for 24 hours after surgery     Return  to work    You may return to work after 72 hours if you are feeling well and your job does not involve heavy lifting.     Dressing Removal    You may take off the dressing on your groin the day after your procedure.     Incision Care    Keep the incision area dry and clean.  You do not need to use a bandage on your incision.     Shower / Bathing    It is ok to shower with regular soap. Pat dry, do not rub. No tub bath for 3 days. No swimming in a pool or hot tub immersion for 1 week     Reason for your hospital stay    You were in the hospital due to chest pain. You had an angiogram that did not show significant coronary disease. After the angiogram, you had right groin pain. It is unclear what caused this, but it does not appear that you have a pseudoaneurysm or bleed.     Follow-up and recommended labs and tests     Follow up with primary care provider, Shola Benoit MD, within 7 days for hospital follow- up.  No follow up labs or test are needed.    Follow up with cardiology as scheduled.     Activity    Your activity upon discharge: activity as tolerated     Diet    Follow this diet upon discharge: Orders Placed This Encounter      Combination Diet 2 gm NA Diet       Significant Results and Procedures   Most Recent 3 CBC's:  Recent Labs   Lab Test 11/17/23  0628 11/16/23  1129 09/01/23  0718 08/31/23  1111   WBC 6.1 6.7 6.9 6.9   HGB 10.8* 10.8* 10.9* 10.3*   MCV 90 95 94 93   PLT  --  194 186 182     Most Recent 3 BMP's:  Recent Labs   Lab Test 11/17/23  1705 11/17/23  1317 11/17/23  0744 11/17/23  0628 11/16/23  2216 11/16/23  1129 10/02/23  1304   NA  --   --   --  140  --  142 139   POTASSIUM  --   --   --  4.2  --  4.7 4.8   CHLORIDE  --   --   --  106  --  109* 106   CO2  --   --   --  20*  --  25 20*   BUN  --   --   --  23.8*  --  27.4* 26.3*   CR  --   --   --  1.08*  --  1.26* 1.38*   ANIONGAP  --   --   --  14  --  8 13   RINKU  --   --   --  9.0  --  9.2 9.3   GLC 90 103* 115* 108*   < > 87  173*    < > = values in this interval not displayed.     Most Recent 3 Troponin's:No lab results found.,   Results for orders placed or performed during the hospital encounter of 23   Us Post Vascular Access Low Ext Duplex    Narrative    US POST VASCULAR ACCESS LOW EXTREMITY DUPLEX  2023 3:33 PM     HISTORY:  Bruit in the right groin following angiography.    COMPARISON: None.    FINDINGS/    Impression    IMPRESSION: Color Doppler and spectral waveform analysis performed.     No definite pseudoaneurysm identified. The proximal femoral arteries  and adjacent veins are patent.      URBAN VALENZUELA MD         SYSTEM ID:  [id number]   Echocardiogram Limited     Value    LVEF  60-65%    Narrative    [id number]  [id number]  [id number]  [id number]^JAYSON^UMANG     [facility]  Echocardiography Laboratory  [address] [place] [address]     Name: SUZANNE BROOKE  MRN: [id number]  : 1945  Study Date: 2023 11:35 AM  Age: 78 yrs  Gender: Female  Patient Location: [facility]  Reason For Study: Chest Pain  Ordering Physician: UMANG TYLER  Referring Physician: Shola Benoit  Performed By: Neli Turner RDCS     BSA: 1.9 m2  Height: 60 in  Weight: 203 lb  HR: 57  BP: 148/55 mmHg  ______________________________________________________________________________  Procedure  Limited Portable Echo Adult. Optison (NDC #[id number]) given intravenously.  ______________________________________________________________________________  Interpretation Summary     The left ventricle is normal in size.  Left ventricular systolic function is normal. The visual ejection fraction is  60-65%.  Normal left ventricular wall motion  The aortic valve is trileaflet with aortic valve sclerosis. No hemodynamically  significant valvular aortic stenosis.  The right ventricle is normal in structure, function and size.  Compared to prior study, there is no significant  change.  ______________________________________________________________________________  Left Ventricle  The left ventricle is normal in size. There is normal left ventricular wall  thickness. Left ventricular systolic function is normal. The visual ejection  fraction is 60-65%. Normal left ventricular wall motion.     Right Ventricle  The right ventricle is normal in structure, function and size.     Atria  Normal left atrial size. Right atrial size is normal. There is no atrial shunt  seen.     Mitral Valve  There is mild mitral annular calcification. There is trace mitral  regurgitation.     Tricuspid Valve  The tricuspid valve is normal in structure and function. There is trace  tricuspid regurgitation.     Aortic Valve  The aortic valve is trileaflet with aortic valve sclerosis. No aortic  regurgitation is present. The peak AoV pressure gradient is 21.0 mmHg. The  mean AoV pressure gradient is 12.0 mmHg. No hemodynamically significant  valvular aortic stenosis.     Pulmonic Valve  The pulmonic valve is not well seen, but is grossly normal. There is trace  pulmonic valvular regurgitation.     Vessels  Normal size aorta.     Pericardium  There is no pericardial effusion.     ______________________________________________________________________________  Doppler Measurements & Calculations  Ao V2 max: 229.0 cm/sec  Ao max P.0 mmHg  Ao V2 mean: 162.0 cm/sec  Ao mean P.0 mmHg  Ao V2 VTI: 59.7 cm     LV V1 max P.1 mmHg  LV V1 max: 142.0 cm/sec  LV V1 VTI: 37.4 cm  AV Deny Ratio (DI): 0.62     ______________________________________________________________________________  Report approved by: Jaquelin Bruner 2023 02:24 PM         Cardiac Catheterization    Narrative    1.  No obstructive coronary artery disease on coronary angiography.  Study   essentially unchanged from 2018.  2.  No high-grade proximal or ostial stenosis on bilateral renal   angiogram.       *Note: Due to a large number of results  and/or encounters for the requested time period, some results have not been displayed. A complete set of results can be found in Results Review.       Discharge Medications   Current Discharge Medication List        START taking these medications    Details   amLODIPine (NORVASC) 5 MG tablet Take 1 tablet (5 mg) by mouth daily  Qty: 30 tablet, Refills: 3    Associated Diagnoses: Benign essential hypertension           CONTINUE these medications which have NOT CHANGED    Details   acetaminophen (TYLENOL) 500 MG tablet Take 1,000 mg by mouth every 8 hours as needed      acetaminophen-caffeine (EXCEDRIN TENSION HEADACHE) 500-65 MG TABS Take 2 tablets by mouth every 6 hours as needed for mild pain  Qty: 90 tablet, Refills: 0    Associated Diagnoses: Episodic tension-type headache, not intractable      amoxicillin (AMOXIL) 500 MG capsule Takes 4 caps before every dental appointments.      aspirin (ASA) 81 MG EC tablet Take 81 mg by mouth daily      azelastine (ASTEPRO) 0.15 % nasal spray Spray 1 spray into both nostrils daily as needed      BIOTIN PO Take 1 capsule by mouth at bedtime Unknown dose      Calcium Carbonate-Vitamin D (CALTRATE 600+D PO) Take 1 tablet by mouth daily Takes in the afternoon      carvedilol (COREG) 12.5 MG tablet Take 1 tablet (12.5 mg) by mouth 2 times daily (with meals)  Qty: 180 tablet, Refills: 3    Associated Diagnoses: Benign essential hypertension      clotrimazole (LOTRIMIN) 1 % cream Apply topically as needed (rash/yeast infection)      Cyanocobalamin (B-12 PO) Take 1 tablet by mouth daily Unknown dose. Takes in the afternoon      empagliflozin (JARDIANCE) 10 MG TABS tablet Take 1 tablet (10 mg) by mouth daily  Qty: 90 tablet, Refills: 1    Associated Diagnoses: Type 2 diabetes, HbA1c goal < 7% (H)      ezetimibe (ZETIA) 10 MG tablet Take 1 tablet (10 mg) by mouth daily  Qty: 90 tablet, Refills: 3    Associated Diagnoses: Mixed hyperlipidemia      famotidine (PEPCID) 20 MG tablet Take 2  tablets (40 mg) by mouth daily  Qty: 180 tablet, Refills: 3    Associated Diagnoses: Gastroesophageal reflux disease without esophagitis      fenofibrate (TRICOR) 145 MG tablet Take 1 tablet (145 mg) by mouth daily  Qty: 90 tablet, Refills: 3    Associated Diagnoses: CARDIOVASCULAR SCREENING; LDL GOAL LESS THAN 130      fexofenadine (ALLEGRA) 180 MG tablet Take 180 mg by mouth daily Takes in the afternoon  Qty: 120, Refills: 0      fluocinolone acetonide (DERMA SMOOTHE/FS BODY) 0.01 % external oil Apply 2 mL to scalp once per week. Massage into scalp. Can be left in overnight or washed out after 4-6 hours.  Qty: 118.28 mL, Refills: 5    Associated Diagnoses: Dermatitis      fluticasone (FLONASE) 50 MCG/ACT spray Spray 2 sprays in nostril daily 2 sprays in each nostril qd  Qty: 1 Bottle, Refills: 0    Associated Diagnoses: Seasonal allergic rhinitis due to other allergic trigger      furosemide (LASIX) 20 MG tablet Take 1 tablet (20 mg) by mouth daily as needed (lower extremity edema)  Qty: 90 tablet, Refills: 3    Comments: For Profile Only - patient will call to fill  Associated Diagnoses: Benign essential hypertension      icosapent ethyl (VASCEPA) 1 g CAPS capsule TAKE 1 CAPSULE BY MOUTH 2 TIMES DAILY  Qty: 60 capsule, Refills: 11    Associated Diagnoses: Mixed hyperlipidemia      insulin aspart (NOVOLOG FLEXPEN) 100 UNIT/ML pen Inject 10 Units Subcutaneous 2 times daily (with meals) Breakfast and supper, pt eats minimal at lunch  Qty: 15 mL, Refills: 3    Associated Diagnoses: Type 2 diabetes, HbA1c goal < 7% (H)      insulin glargine (LANTUS VIAL) 100 UNIT/ML vial Inject 30 Units Subcutaneous every morning If BG<150-->takes 12 units (50% of dose) OK to substitute Basaglar.  Qty: 30 mL, Refills: 1    Associated Diagnoses: Type 2 diabetes, HbA1c goal < 7% (H)      levothyroxine (SYNTHROID/LEVOTHROID) 112 MCG tablet Take 1 tablet (112 mcg) by mouth daily  Qty: 90 tablet, Refills: 3    Associated Diagnoses:  Papillary carcinoma of thyroid (H)      Lidocaine (LIDOCARE) 4 % Patch Place 2 patches onto the skin every 24 hours To prevent lidocaine toxicity, patient should be patch free for 12 hrs daily.  Refills: 0    Associated Diagnoses: Left anterior shoulder pain      minoxidil (LONITEN) 2.5 MG tablet Take half tablet (1.25 mg) by mouth once daily  Qty: 45 tablet, Refills: 0    Associated Diagnoses: Androgenic alopecia      MULTIPLE VITAMIN PO Take 1 tablet by mouth daily      nitroGLYcerin (NITROSTAT) 0.4 MG sublingual tablet Place 1 tablet (0.4 mg) under the tongue every 5 minutes as needed for chest pain (no more than 3 in one hour; after 3rd, call 911.)  Qty: 25 tablet, Refills: 3    Associated Diagnoses: Atypical chest pain      nystatin (MYCOSTATIN) cream Apply topically daily as needed (groin)      nystatin-triamcinolone (MYCOLOG II) cream Apply topically daily as needed (genital itching)      olmesartan (BENICAR) 40 MG tablet Take 1 tablet (40 mg) by mouth daily  Qty: 90 tablet, Refills: 1    Associated Diagnoses: Benign essential hypertension      ondansetron (ZOFRAN) 4 MG tablet Take 1 tablet (4 mg) by mouth every 6 hours as needed Reported on 3/20/2017  Qty: 20 tablet, Refills: 0    Associated Diagnoses: Diabetic nephropathy associated with type 2 diabetes mellitus (H)      pantoprazole (PROTONIX) 40 MG EC tablet Take 40 mg by mouth 2 times daily      Polyethylene Glycol 400 (BLINK TEARS) 0.25 % GEL Apply to eye At Bedtime      polyethylene glycol 400 (BLINK TEARS) 0.25 % SOLN ophthalmic solution Place 1 drop into both eyes daily      sucralfate (CARAFATE) 1 GM/10ML suspension Take 1 g by mouth 4 times daily as needed (GERD)      tretinoin (RETIN-A) 0.025 % external cream Use every night as tolerated - spot treat lesion  Qty: 20 g, Refills: 3    Associated Diagnoses: Milia      Vitamin D3 50 mcg (2000 units) tablet Take 1 tablet by mouth daily Takes in the afternoon      Continuous Blood Gluc Sensor (FREESTYLE  BRANDY 3 SENSOR) MISC 1 each every 14 days  Qty: 6 each, Refills: 3    Associated Diagnoses: Type 2 diabetes, HbA1c goal < 7% (H)      insulin pen needle (B-D U/F) 31G X 5 MM miscellaneous Use 1 pen needles daily or as directed.  Qty: 90 each, Refills: 3    Associated Diagnoses: Type 2 diabetes, HbA1c goal < 7% (H)      order for DME Equipment being ordered: Compression stockings - Knee High; 20-30 mmHg compression - note would like adhesive band to keep the stocking from sliding down  Qty: 3 each, Refills: 0    Associated Diagnoses: Insufficiency, arterial, peripheral (H24)      triamcinolone (KENALOG) 0.1 % external ointment Apply topically every other day  Qty: 80 g, Refills: 3    Associated Diagnoses: Dermatitis           Allergies   Allergies   Allergen Reactions    Ketorolac Tromethamine Difficulty breathing     Shortness of breath to tablets only per the patient    Nsaids Difficulty breathing     Increased creatinine    Celecoxib Itching and Rash    Morphine And Related Itching     With higher doses. Pt denies this allergy 11/16/2023    Codeine Itching    Crestor [Rosuvastatin] Muscle Pain (Myalgia)    No Clinical Screening - See Comments Itching     Fragrance    Vioxx Other (See Comments)     Heart races    Conjugated Estrogens Rash    Sulfa Antibiotics Rash

## 2023-11-17 NOTE — PROVIDER NOTIFICATION
C/o shoulder & low back pain, wants Tylenol 1000 mg & lidocare patch per PTA home dosing, gave prn hydralazine for /74 at 0538. Notified Dr. Edmondson, see MD's .

## 2023-11-17 NOTE — CONSULTS
Care Management Initial Consult    General Information  Assessment completed with: Sammie Velasquez  Type of CM/SW Visit: Initial Assessment    Primary Care Provider verified and updated as needed: Yes   Readmission within the last 30 days: no previous admission in last 30 days      Reason for Consult: other (see comments) (elevated risk score)  Advance Care Planning: Advance Care Planning Reviewed: no concerns identified          Communication Assessment  Patient's communication style: spoken language (English or Bilingual)    Hearing Difficulty or Deaf: no   Wear Glasses or Blind: yes    Cognitive  Cognitive/Neuro/Behavioral: WDL  Level of Consciousness: alert     Orientation: oriented x 4             Living Environment:   People in home: alone     Current living Arrangements: apartment      Able to return to prior arrangements:         Family/Social Support:  Care provided by: self  Provides care for:    Marital Status:   Children          Description of Support System: Supportive, Involved         Current Resources:   Patient receiving home care services: Yes ([facility])  Skilled Home Care Services: Skilled Nursing, Occupational Therapy  Community Resources:    Equipment currently used at home: walker, rolling, raised toilet seat  Supplies currently used at home:      Employment/Financial:  Employment Status: retired        Financial Concerns: none           Does the patient's insurance plan have a 3 day qualifying hospital stay waiver?  Yes     Which insurance plan 3 day waiver is available? [employer]    Will the waiver be used for post-acute placement? No    Lifestyle & Psychosocial Needs:  Social Determinants of Health     Food Insecurity: Low Risk  (11/8/2023)    Food Insecurity     Within the past 12 months, did you worry that your food would run out before you got money to buy more?: No     Within the past 12 months, did the food you bought just not last and you didn t have money to get  more?: No   Depression: Not at risk (11/8/2023)    PHQ-2     PHQ-2 Score: 1   Housing Stability: Low Risk  (11/8/2023)    Housing Stability     Do you have housing? : Yes     Are you worried about losing your housing?: No   Tobacco Use: Low Risk  (11/8/2023)    Patient History     Smoking Tobacco Use: Never     Smokeless Tobacco Use: Never     Passive Exposure: Not on file   Financial Resource Strain: Low Risk  (11/8/2023)    Financial Resource Strain     Within the past 12 months, have you or your family members you live with been unable to get utilities (heat, electricity) when it was really needed?: No   Alcohol Use: Not on file   Transportation Needs: High Risk (11/8/2023)    Transportation Needs     Within the past 12 months, has lack of transportation kept you from medical appointments, getting your medicines, non-medical meetings or appointments, work, or from getting things that you need?: Yes   Physical Activity: Not on file   Interpersonal Safety: Not At Risk (3/9/2020)    Humiliation, Afraid, Rape, and Kick questionnaire     Fear of Current or Ex-Partner: No     Emotionally Abused: No     Physically Abused: No     Sexually Abused: No   Stress: Not on file   Social Connections: Not on file       Functional Status:  Prior to admission patient needed assistance:   Dependent ADLs:: Ambulation-walker  Dependent IADLs:: Transportation       Mental Health Status:  Mental Health Status: No Current Concerns       Chemical Dependency Status:  Chemical Dependency Status: No Current Concerns             Values/Beliefs:  Spiritual, Cultural Beliefs, Gnosticist Practices, Values that affect care:                 Additional Information:  Consult for elevated risk score.  Introduced self and role.  Reviewed BURT with patient.  Pt shared she lives in an apartment and is independent except for she does not drive.  Pt uses [employer] and voiced frustration with being late at times to appointments with the transportation  service.  CC to give pt list of other transportation options and pt voiced appreciation.    Pt uses a rolling walker for ambulation.  She shared that her  lives in a nursing home.  [place] [facility] [place] [facility] [place].    Pt currently open to [facility] home care for RN for wound care once a week.  Pt does the wound care herself the rest of the week.      Verified pt primary.  CC to follow for any needs identified at discharge.      Arlene Torres RN, BS  Care Coordinator  [email or web address]

## 2023-11-17 NOTE — CONSULTS
[facility]    Cardiology Consultation     Date of Admission:  11/16/2023    Assessment & Plan   Chasity Hodgson is a 78 year old female who was admitted on 11/16/2023.    Non-STEMI  History of typical anginal chest pain and chest pain with atypical symptoms.  Mild coronary artery disease in 2018 angiogram  Resistant hypertension  Mild anemia, stable  Left lower extremity wound  CKD 3   Dyslipidemia with statin intolerance on zetia and fenofibrate  Post-polio syndrome    Given her somewhat recent negative stress test this year and increasing symptoms since that time we will proceed with invasive evaluation for obstructive CAD with intent for revascularization.  Symptoms may be also related to significant hypertension    Coronary angiogram with possible percutaneous coronary intervention  Consider evaluation for resistant hypertension with secondary causes  Limited echo to evaluate wall motion  Will follow up on cath and echo result    High complexity     Paulina Ely MD, MD    Primary Care Physician   Shola Benoit MD    Reason for Consult   Reason for consult: I was asked by Dr. Robles to evaluate this patient for chest pain.        EKG shows sinus rhythm the EKG from today shows lateral T wave Carli is a 78-year-old female patient with history of DM 2, hypertension obstructive sleep apnea hypertension, dyslipidemia, resistant hypertension CKD 3 here for evaluation of chest pain.  She also has a history of orthopedic issues and had polio at age 3 and did not walking until age 6 and has residual right leg weakness as well as neuropathy.  She had previous colorectal surgery and has a stable anemia but no bleeding symptoms.    She has had increasing chest discomfort associated with shortness of breath with activity over a few months.    She typically does not get pain unless she does heavier activities such as walking in the mall.  Activities of daily living do not typically cause  pain.  Yesterday she had an episode of pain at rest and this morning she is complaining of a sensation of throbbing in her chest.  She is also being followed for a left lower extremity wound.  She had a positive troponin which was apparently ordered at a clinic visit and appears to have chronic mild troponin elevation.  Normality which is different from the EKG on 11/16/2023.    High-sensitivity troponin is very mildly elevated, similar to what has been seen in the past.    She had a negative Lexiscan stress test in April    A coronary angiogram in 2018 showed mild nonocclusive coronary artery disease. I personally reviewed: diffuse plaque, mild prox LAD stenosis.     She has been quite hypertensive here but better this morning.  Home antihypertensive regimen includes carvedilol 12.5 mg p.o. twice daily, furosemide 20 mg/day as needed for lower extremity edema, minoxidil 0.625 mg p.o. daily olmesartan 40 mg p.o. daily.    History of Present Illness   Chasity Hodgson is a 78 year old female who presents with chest pain    Past Medical History   Past Medical History:   Diagnosis Date    Abdominal adhesions 1984, 96,99    s/p lysis    Abdominal adhesions     Acne rosacea     Allergic rhinitis     Allergic rhinitis, cause unspecified     Alopecia     Anemia     CAD (coronary artery disease)     Carotid stenosis     CKD (chronic kidney disease) stage 2, GFR 60-89 ml/min     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)     CPAP (continuous positive airway pressure) dependence     Depression     Diabetic gastroparesis (H)     Diet-controlled type 2 diabetes mellitus (H)     DM2 (diabetes mellitus, type 2) (H)     DVT (deep venous thrombosis) (H)     DVT of axillary vein, acute right (H)     Fibromyalgia     Gastro-oesophageal reflux disease     GERD (gastroesophageal reflux disease)     Hernia of unspecified site of abdominal cavity without mention of obstruction or gangrene     bilateral    Hernia, abdominal     History of blood transfusion      10/ 1980    History of thrombophlebitis     HTN (hypertension)     HTN (hypertension)     Hypertriglyceridemia     Hypertriglyceridemia     Hypokalemia     Hypothyroidism     Mumps     Obstructive sleep apnea     ANNETTE (obstructive sleep apnea)     ANNETTE on CPAP     Osteoarthritis of glenohumeral joint     Papillary carcinoma of thyroid (H)     s/p thyroidectomy - Ruegemer    Papillary carcinoma of thyroid (H)     PE (pulmonary embolism)     Poliomyelitis     poor circulation right leg    Poliomyelitis     Postsurgical hypothyroidism     s/p papillary thryoid cancer - Ruegemer    Pulmonary embolism (H)     Pulmonary embolus (H)     Pulmonary nodule     Rosacea     S/P carpal tunnel release     bilateral    S/P hardware removal 01/2014    still with lingering foot pain    S/P shoulder surgery     bilateral    Septic joint (H)     right knee    Septic joint of right knee joint (H)     Venous insufficiency     Venous insufficiency     Venous thrombosis 1999    right axillary vein         Past Surgical History   Past Surgical History:   Procedure Laterality Date    AMPUTATE TOE(S)  03/15/2012    Procedure:AMPUTATE TOE(S); Surgeon:RUBEN MOSES; Location: OR    AMPUTATE TOE(S)      APPENDECTOMY  1972    APPENDECTOMY      ARTHRODESIS FOOT  03/15/2012    Procedure:ARTHRODESIS FOOT; RIGHT TRIPLE ARTHRODESIS, FIFTH TOE AMPUTATION, LATERAL LIGAMENT RECONSTRUCTION, TENDON TRANSFER AND RELEASE [MINI C-ARM, ARTHREX 4.5 AND 6.7 STAPLES, BIOCOMPOSITE TENODESIS SCREWS]; Surgeon:RUBEN MOSES; Location:[facility] OR    ARTHRODESIS FOOT Right     Right foot triple arthrodesis and removal of hardware    ARTHROSCOPY SHOULDER  06/25/2015    REVISION SUBACROMIAL DECOMPRESSION, EXCISION OF GANGLION CYST, DEBRIDEMENT AND EXCISION OF THE GLENOHUMERAL JOINT GANGLION CYST, CORACOID DECOMPRESSION, POSSIBLE SUBSCAPULARIS REPAIR AND OPEN SUBSCAPULARIS BICEP    ARTHROSCOPY SHOULDER ROTATOR CUFF REPAIR      BIOPSY  Thyroid 2002     BLADDER SURGERY      BOTOX INJECTION MEDICAL      BREAST SURGERY  Biopsy    CHOLECYSTECTOMY      CHOLECYSTECTOMY      COLONOSCOPY  2018    COLOSTOMY  2012    Procedure:COLOSTOMY; CREATION OF SIGMOID COLOSTOMY AND EXTENSIVE  LYSIS OF ADHESIONS; Surgeon:MONTSERRAT BENDER; Location:[facility] OR    COLOSTOMY      CYSTOSCOPY      CYSTOSCOPY, INJECT BOTOX N/A 2023    Procedure: CYSTOSCOPY, WITH BOTULINUM TOXIN INJECTION;  Surgeon: Mishel Zendejas MD;  [place] [facility] [place]    EYE SURGERY      GENITOURINARY SURGERY      GI SURGERY      weakened rectal sphincter with artificial stimulator    HERNIA REPAIR      HYSTERECTOMY TOTAL ABDOMINAL      LAPAROTOMY, LYSIS ADHESIONS, COMBINED  2012    Procedure:COMBINED LAPAROTOMY, LYSIS ADHESIONS; Surgeon:MONTSERRAT BENDER; Location:[facility] OR    RELEASE CARPAL TUNNEL      RELEASE TENDON FOOT  03/15/2012    Procedure:RELEASE TENDON FOOT; Surgeon:RUBEN METZ; Location: OR    REMOVE HARDWARE FOOT  2012    Procedure: REMOVE HARDWARE FOOT;  RIGHT FOOT REMOVAL OF HARDWARE;  Surgeon: Ruben Metz MD;  Location:  OR    SHOULDER SURGERY  2020    LEFT SHOULDER HEMIARHTROPLASTY, BICEP TENODESIS    SOFT TISSUE SURGERY  2020    TENDON RELEASE      foot    THYROIDECTOMY      ZZC FREEING BOWEL ADHESION,ENTEROLYSIS      , ,     ZZC NONSPECIFIC PROCEDURE      throidectomy    ZZC TOTAL ABDOM HYSTERECTOMY      + BSO         Prior to Admission Medications   Prior to Admission Medications   Prescriptions Last Dose Informant Patient Reported? Taking?   BIOTIN PO 11/15/2023  Yes Yes   Sig: Take 1 capsule by mouth at bedtime Unknown dose   Calcium Carbonate-Vitamin D (CALTRATE 600+D PO) 11/15/2023  Yes Yes   Sig: Take 1 tablet by mouth daily Takes in the afternoon   Continuous Blood Gluc Sensor (FREESTYLE BRANDY 3 SENSOR) [facility] [place]   No No   Si each every 14 days   Cyanocobalamin (B-12 PO) 11/15/2023  Yes Yes   Sig: Take 1  tablet by mouth daily Unknown dose. Takes in the afternoon   Lidocaine (LIDOCARE) 4 % Patch 11/15/2023 at not on currently  No Yes   Sig: Place 2 patches onto the skin every 24 hours To prevent lidocaine toxicity, patient should be patch free for 12 hrs daily.   Patient taking differently: Place 2-3 patches onto the skin every 24 hours To prevent lidocaine toxicity, patient should be patch free for 12 hrs daily.   MULTIPLE VITAMIN PO 11/15/2023  Yes Yes   Sig: Take 1 tablet by mouth daily   Polyethylene Glycol 400 (BLINK TEARS) 0.25 % GEL 11/15/2023  Yes Yes   Sig: Apply to eye At Bedtime   Vitamin D3 50 mcg (2000 units) tablet 11/15/2023 at 1300  Yes Yes   Sig: Take 1 tablet by mouth daily Takes in the afternoon   acetaminophen (TYLENOL) 500 MG tablet 11/16/2023 at am  Yes Yes   Sig: Take 1,000 mg by mouth every 8 hours as needed   acetaminophen-caffeine (EXCEDRIN TENSION HEADACHE) 500-65 MG TABS Unknown  No Yes   Sig: Take 2 tablets by mouth every 6 hours as needed for mild pain   amoxicillin (AMOXIL) 500 MG capsule Unknown  Yes Yes   Sig: Takes 4 caps before every dental appointments.   aspirin (ASA) 81 MG EC tablet 11/15/2023 at 1300  Yes Yes   Sig: Take 81 mg by mouth daily   azelastine (ASTEPRO) 0.15 % nasal spray Unknown  Yes Yes   Sig: Spray 1 spray into both nostrils daily as needed   carvedilol (COREG) 12.5 MG tablet 11/16/2023 at am  No Yes   Sig: Take 1 tablet (12.5 mg) by mouth 2 times daily (with meals)   clotrimazole (LOTRIMIN) 1 % cream Unknown  Yes Yes   Sig: Apply topically as needed (rash/yeast infection)   empagliflozin (JARDIANCE) 10 MG TABS tablet 11/15/2023  No Yes   Sig: Take 1 tablet (10 mg) by mouth daily   ezetimibe (ZETIA) 10 MG tablet 11/15/2023  No Yes   Sig: Take 1 tablet (10 mg) by mouth daily   famotidine (PEPCID) 20 MG tablet 11/15/2023  No Yes   Sig: Take 2 tablets (40 mg) by mouth daily   fenofibrate (TRICOR) 145 MG tablet 11/15/2023  No Yes   Sig: Take 1 tablet (145 mg) by mouth  daily   fexofenadine (ALLEGRA) 180 MG tablet 11/15/2023 at 1300  Yes Yes   Sig: Take 180 mg by mouth daily Takes in the afternoon   fluocinolone acetonide (DERMA SMOOTHE/FS BODY) 0.01 % external oil Past Week  No Yes   Sig: Apply 2 mL to scalp once per week. Massage into scalp. Can be left in overnight or washed out after 4-6 hours.   fluticasone (FLONASE) 50 MCG/ACT spray 11/15/2023  No Yes   Sig: Spray 2 sprays in nostril daily 2 sprays in each nostril qd   furosemide (LASIX) 20 MG tablet Unknown  No Yes   Sig: Take 1 tablet (20 mg) by mouth daily as needed (lower extremity edema)   icosapent ethyl (VASCEPA) 1 g CAPS capsule 11/15/2023 at pm  No Yes   Sig: TAKE 1 CAPSULE BY MOUTH 2 TIMES DAILY   Patient taking differently: Take 1 g by mouth every evening   insulin aspart (NOVOLOG FLEXPEN) 100 UNIT/ML pen 11/15/2023 at pm  No Yes   Sig: Inject 10 Units Subcutaneous 2 times daily (with meals) Breakfast and supper, pt eats minimal at lunch   insulin glargine (LANTUS VIAL) 100 UNIT/ML vial 11/16/2023 at am  No Yes   Sig: Inject 30 Units Subcutaneous every morning If BG<150-->takes 12 units (50% of dose) OK to substitute Basaglar.   insulin pen needle (B-D U/F) 31G X 5 MM miscellaneous   No No   Sig: Use 1 pen needles daily or as directed.   levothyroxine (SYNTHROID/LEVOTHROID) 112 MCG tablet 11/16/2023 at am  No Yes   Sig: Take 1 tablet (112 mcg) by mouth daily   minoxidil (LONITEN) 2.5 MG tablet 11/16/2023 at am  No Yes   Sig: Take half tablet (1.25 mg) by mouth once daily   Patient taking differently: Take 0.625 mg by mouth daily Take 1/4 tablet (0.625 mg) by mouth once daily   nitroGLYcerin (NITROSTAT) 0.4 MG sublingual tablet Unknown  No Yes   Sig: Place 1 tablet (0.4 mg) under the tongue every 5 minutes as needed for chest pain (no more than 3 in one hour; after 3rd, call 911.)   nystatin (MYCOSTATIN) cream Unknown  Yes Yes   Sig: Apply topically daily as needed (groin)   nystatin-triamcinolone (MYCOLOG II) cream  Unknown  Yes Yes   Sig: Apply topically daily as needed (genital itching)   olmesartan (BENICAR) 40 MG tablet 11/16/2023 at am  No Yes   Sig: Take 1 tablet (40 mg) by mouth daily   ondansetron (ZOFRAN) 4 MG tablet Unknown  No Yes   Sig: Take 1 tablet (4 mg) by mouth every 6 hours as needed Reported on 3/20/2017   order for DME   No No   Sig: Equipment being ordered: Compression stockings - Knee High; 20-30 mmHg compression - note would like adhesive band to keep the stocking from sliding down   pantoprazole (PROTONIX) 40 MG EC tablet 11/16/2023 at am  Yes Yes   Sig: Take 40 mg by mouth 2 times daily   polyethylene glycol 400 (BLINK TEARS) 0.25 % SOLN ophthalmic solution 11/16/2023  Yes Yes   Sig: Place 1 drop into both eyes daily   sucralfate (CARAFATE) 1 GM/10ML suspension Unknown  Yes Yes   Sig: Take 1 g by mouth 4 times daily as needed (GERD)   tretinoin (RETIN-A) 0.025 % external cream More than a month  No Yes   Sig: Use every night as tolerated - spot treat lesion   Patient taking differently: Apply topically nightly as needed (facial lesions) Use every night as tolerated - spot treat lesion   triamcinolone (KENALOG) 0.1 % external ointment Not Taking  No No   Sig: Apply topically every other day   Patient not taking: Reported on 11/16/2023      Facility-Administered Medications: None     Current Facility-Administered Medications   Medication Dose Route Frequency    aspirin  81 mg Oral Daily    carvedilol  12.5 mg Oral BID w/meals    insulin aspart   Subcutaneous TID w/meals    insulin aspart  1-7 Units Subcutaneous TID AC    insulin aspart  1-5 Units Subcutaneous At Bedtime    insulin glargine  12 Units Subcutaneous QAM    levothyroxine  112 mcg Oral QAM AC    lidocaine  2 patch Transdermal Q24H    losartan  100 mg Oral Daily    pantoprazole  40 mg Oral BID    sodium chloride (PF)  3 mL Intracatheter Q8H     Current Facility-Administered Medications   Medication Last Rate     Allergies   Allergies   Allergen  Reactions    Ketorolac Tromethamine Difficulty breathing     Shortness of breath to tablets only per the patient    Nsaids Difficulty breathing     Increased creatinine    Celecoxib Itching and Rash    Morphine And Related Itching     With higher doses. Pt denies this allergy 11/16/2023    Codeine Itching    Crestor [Rosuvastatin] Muscle Pain (Myalgia)    No Clinical Screening - See Comments Itching     Fragrance    Vioxx Other (See Comments)     Heart races    Conjugated Estrogens Rash    Sulfa Antibiotics Rash       Social History    reports that she has never smoked. She has never used smokeless tobacco. She reports that she does not drink alcohol and does not use drugs.  Her  is in a nursing home due to Parkinson's  She lives independently but thinks she could maybe use some help at home.  She has adult children who are supportive.    Family History   I have reviewed this patient's family history and updated it with pertinent information if needed.  Family History   Problem Relation Age of Onset    Arthritis Mother     Hypertension Mother     Cerebrovascular Disease Mother     Obesity Mother     Heart Disease Mother         MI's    Hypertension Father     Respiratory Father         Adult RDS    Diabetes Father     Arthritis Sister     Cancer Sister     Diabetes Sister     Hypertension Sister     Breast Cancer Sister     Depression Sister     Thyroid Disease Sister     Obesity Sister     Arthritis Sister     Hypertension Sister     Thyroid Disease Sister     Osteoporosis Sister     Obesity Sister     Cancer Sister         colon polup    Hypertension Sister     Osteoporosis Sister     Obesity Sister     Colon Cancer Sister     Lipids Sister     Obesity Sister     Obesity Maternal Grandmother         Dad s mother    Skin Cancer Maternal Grandmother         skin cancer unknown    Cancer Maternal Grandmother         unknown skin cancer on face    Obesity Paternal Grandmother         Mothers mother    Heart  Disease Brother         MI at 54    Other Cancer Brother         Lung & bone    Lipids Brother     Hypertension Brother     Diabetes Brother     Hyperlipidemia Brother     Ovarian Cancer No family hx of           Review of Systems   A comprehensive review of system was performed and is negative other than that noted in the HPI or here.     Physical Exam   Vital Signs with Ranges  Temp:  [97.5  F (36.4  C)-98.7  F (37.1  C)] 98.1  F (36.7  C)  Pulse:  [52-76] 76  Resp:  [14-30] 19  BP: (120-201)/(53-83) 148/55  SpO2:  [96 %-100 %] 97 %  Wt Readings from Last 4 Encounters:   11/17/23 92.1 kg (203 lb)   11/16/23 94.3 kg (208 lb)   11/08/23 94.6 kg (208 lb 8 oz)   10/03/23 90.7 kg (200 lb)     I/O last 3 completed shifts:  In: 120 [P.O.:120]  Out: -       Vitals: BP (!) 148/55   Pulse 76   Temp 98.1  F (36.7  C) (Oral)   Resp 19   Wt 92.1 kg (203 lb)   SpO2 97%   BMI 39.65 kg/m      Physical Exam:   General - Alert and oriented to time place and person in no acute distress  Eyes - No scleral icterus  HEENT - Neck supple, moist mucous membranes  Cardiovascular - distant, regular, no murmur  Extremities - There is nonpitting edema, LLE in a wrap, did not eval wound   Respiratory - clear lung  Skin - No pallor or cyanosis  Gastrointestinal - Non tender and non distended without rebound or guarding  Psych - normnal  affect   Neurological - No gross motor neurological focal deficits    HS trops ~20    Lab Results   Component Value Date    CHOL 160 10/02/2023    CHOL 196 03/29/2019     Lab Results   Component Value Date    HDL 30 10/02/2023    HDL 27 03/29/2019     Lab Results   Component Value Date    LDL 72 10/02/2023    LDL 99 03/29/2019     Lab Results   Component Value Date    TRIG 288 10/02/2023    TRIG 349 03/29/2019     Lab Results   Component Value Date    CHOLHDLRATIO 4.6 02/15/2017    CHOLHDLRATIO 4.6 03/31/2015         Recent Labs   Lab 11/17/23  0744 11/17/23  0628 11/16/23  2216 11/16/23  1129   WBC  --   "6.1  --  6.7   HGB  --  10.8*  --  10.8*   MCV  --  90  --  95   PLT  --   --   --  194   NA  --  140  --  142   POTASSIUM  --  4.2  --  4.7   CHLORIDE  --  106  --  109*   CO2  --  20*  --  25   BUN  --  23.8*  --  27.4*   CR  --  1.08*  --  1.26*   GFRESTIMATED  --  53*  --  44*   ANIONGAP  --  14  --  8   RINKU  --  9.0  --  9.2   * 108* 110* 87   ALBUMIN  --   --   --  3.9   PROTTOTAL  --   --   --  6.7   BILITOTAL  --   --   --  0.2   ALKPHOS  --   --   --  47   ALT  --   --   --  15   AST  --   --   --  31     Recent Labs   Lab Test 10/02/23  1304 06/28/23  1203 08/01/17  0000 02/15/17  0000   CHOL 160 172   < > 142   HDL 30* 32*   < > 31   LDL 72 83   < > 49   TRIG 288* 284*   < > 309   CHOLHDLRATIO  --   --   --  4.6    < > = values in this interval not displayed.     Recent Labs   Lab 11/17/23  0628 11/16/23  1129   WBC 6.1 6.7   HGB 10.8* 10.8*   HCT 31.8* 33.7*   MCV 90 95   PLT  --  194     No results for input(s): \"PH\", \"PHV\", \"PO2\", \"PO2V\", \"SAT\", \"PCO2\", \"PCO2V\", \"HCO3\", \"HCO3V\" in the last 168 hours.  No results for input(s): \"NTBNPI\", \"NTBNP\" in the last 168 hours.  No results for input(s): \"DD\" in the last 168 hours.  No results for input(s): \"SED\", \"CRP\" in the last 168 hours.  Recent Labs   Lab 11/16/23  1129        No results for input(s): \"TSH\" in the last 168 hours.  No results for input(s): \"COLOR\", \"APPEARANCE\", \"URINEGLC\", \"URINEBILI\", \"URINEKETONE\", \"SG\", \"UBLD\", \"URINEPH\", \"PROTEIN\", \"UROBILINOGEN\", \"NITRITE\", \"LEUKEST\", \"RBCU\", \"WBCU\" in the last 168 hours.    Imaging:  Recent Results (from the past 48 hour(s))   US Lower Extremity Venous Duplex Left    Narrative    EXAM: US LOWER EXTREMITY VENOUS DUPLEX LEFT  LOCATION: [facility]  DATE: 11/16/2023    INDICATION: Left leg swelling and redness; ? bumped it during [employer] ride and scraped her skin; serous leakage since  COMPARISON: Ultrasound 1/6/2023 and 12/7/2022  TECHNIQUE: Venous Duplex ultrasound " of the left lower extremity with and without compression, augmentation and duplex. Color flow and spectral Doppler with waveform analysis performed.    FINDINGS: Exam includes the common femoral, femoral, popliteal, and contralateral common femoral veins as well as segmentally visualized deep calf veins and greater saphenous vein. The exam is limited due to the patient's body habitus and leg swelling.    LEFT: No deep vein thrombosis. No superficial thrombophlebitis. No popliteal cyst.      Impression    IMPRESSION:  1.  No deep or superficial venous thrombosis in the left lower extremity. No fluid collection.   XR Chest 2 Views    Narrative    XR CHEST 2 VIEWS  11/16/2023 11:58 AM       INDICATION: Left leg swelling and redness; ? bumped it during [employer] ride and scraped her skin; serous leakage since 2 weeks ago;  also has chest pain X 1 hour last night  COMPARISON: 5/26/2023       Impression    IMPRESSION: Bilateral shoulder arthroplasties. Otherwise negative  chest. The lungs are clear. No change from previous.    TRISTEN GONZALEZ MD         SYSTEM ID:  [id number]       Echo:  No results found for this or any previous visit (from the past 4320 hour(s)).    Clinically Significant Risk Factors Present on Admission                # Drug Induced Platelet Defect: home medication list includes an antiplatelet medication   # Hypertension: Noted on problem list     # DMII: A1C = 8.4 % (Ref range: 0.0 - 5.6 %) within past 6 months   # Obesity: Estimated body mass index is 39.65 kg/m  as calculated from the following:    Height as of an earlier encounter on 11/16/23: 1.524 m (5').    Weight as of this encounter: 92.1 kg (203 lb).               Chronic Fatigue and Other Debilities: Age-related physical debility  Other reduced mobility

## 2023-11-17 NOTE — PROGRESS NOTES
Cardiology    Reviewed cath result with patient. She was happy to learn that there is no significant CAD and she has no renal artery stenosis.      Cardiac Catheterization    Result Date: 11/17/2023  1.  No obstructive coronary artery disease on coronary angiography.  Study essentially unchanged from 2018.  2.  No high-grade proximal or ostial stenosis on bilateral renal   angiogram.    Clarified that she takes minoxidil at very low dose for hair loss.    Carvedilol dose not increased due to HR trend 50s to 70s.      Assessment  Mild nonobstructive coronary artery disease  Hypertension tension with suboptimal control likely contributing to exertional symptoms.  Minimally elevated troponin likely due to demand ischemia from hypertension    Recs  Start amlodipine 5mg PO daily.  Resume outpatient antihypertensive agents.  The patient was counseled to consider use of as needed nitroglycerin for exertional chest discomfort.  We reviewed instructions for use.  Patient has cardiology follow up previously scheduled with Dr. Bhatia in January 2024.  Cardiology will sign off at this time, please call with any additional questions.

## 2023-11-18 ENCOUNTER — PATIENT OUTREACH (OUTPATIENT)
Dept: CARE COORDINATION | Facility: CLINIC | Age: 78
End: 2023-11-18
Payer: MEDICARE

## 2023-11-18 NOTE — PLAN OF CARE
A&Ox4, denies CP or SOB this shift. S/p angiogram without intervention. Right groin site, right leg and back with significant pain post angio. Cardiology and interventionalist assessed at bedside. US negative for bleed or pseudoaneurysm. Site WDL, CMS intact. Pain resolved by discharge.    VSS, not in pain at time of discharge. Pt states all belongings and paperwork are accounted for. Medications have been filled at in house pharmacy and are with pt at time of discharge. Discharge education complete including medications, follow up appointments, groin site care and all instructions. Pt verbalized understanding. All questions answered. Family here to drive pt home.

## 2023-11-18 NOTE — TELEPHONE ENCOUNTER
I received a call from Sammieangelo Hodgson regarding hypotension. She has a past medical history of mild nonobstructive coronary artery disease, mild anemia, CKD 3.    She was recently discharged with amlodipine 5 mg daily as a new medication, started for hypertension while admitted.    Today, several hours after her blood pressure medications she reported feeling lightheaded.  She used her blood pressure machine at home with the following vitals:    BP 88/44, HR 68    She reports later in the day her blood pressure returned to 130s-150s systolic.    I recommended that Sammie take half a tablet of the amlodipine, 2.5 mg daily, in addition to her other medications and check her blood pressure again tomorrow several hours after she takes her blood pressure medications.  I recommended she continue this regimen assuming she does not develop any further episodes of lightheadedness until she follows up with her primary provider/primary cardiology team.  I advised Sammie to call us back if she develops any recurrent lightheadedness or hypotension.

## 2023-11-18 NOTE — PROGRESS NOTES
Clinic Care Coordination Contact  [facility]: Post-Discharge Note  SITUATION                                                      Admission:    Admission Date: 11/16/23   Reason for Admission: 1. Acute chest pain  R07.9       2. Venous stasis ulcer of left lower leg with edema of left lower leg (H)  Discharge:   Discharge Date: 11/17/23  Discharge Diagnosis: Minimally elevated troponin, likely due to demand ischemia from hypertension  History of typical anginal chest pain and chest pain with atypical symptoms.  Mild nonobstructive coronary artery disease   Resistant hypertension likely contributing to exertional symptoms  Mild anemia, stable  Left lower extremity wound  CKD 3   Dyslipidemia with statin intolerance on zetia and fenofibrate  Post-polio syndrome  History of DVT and PE  Hypothyroidism  DMT2    BACKGROUND                                                      Per hospital discharge summary and inpatient provider notes:    Chasity Hodgson is a 78 year old female with a past medical history of PE, HTN, CAD, CKD, DVT, diabetes, and hypothyroidism who presents with chest pain. The patient states that last night she developed chest pain. She states that it is more of an ache that lasts about an hour. Occasionally she feels it more under her left breast. She denies any nausea, diaphoresis, shortness of breath, or palpitations. She went into her clinic this morning for possible cellulitis in her LLE. Patient states there is a open wound there with some drainage. Patient states that she had an ultrasound of the leg and there was no blood clot. She also had a EKG, chest xray, and lab work which showed elevated troponin.      ASSESSMENT           Discharge Assessment  How are you doing now that you are home?: Patient states she is doing pretty good. States she took her medications. Felt a little light headed and shakey about an 2-2.5 hours after taking the amlodipine.  Blood pressure 2-2.5 hours after taking  medications was 80/44 and pulse was 68.  Patient states right now she feels better. She has been sitting in her recliner since.  Blood pressure on the phone 130/67 and pulse is 64. Patient states she had a headache this morning, this has gone away since her nap.  How are your symptoms? (Red Flag symptoms escalate to triage hotline per guidelines): Worsening;New  Do you feel your condition is stable enough to be safe at home until your provider visit?: Yes  Does the patient have their discharge instructions? : Yes  Does the patient have questions regarding their discharge instructions? : No  Were you started on any new medications or were there changes to any of your previous medications? : Yes  Does the patient have all of their medications?: Yes  Do you have questions regarding any of your medications? : Yes (see comment) (If she should continue to take it due to low blood pressures. RN will get patient to Cardiology to give recommendations on the medication.)  Do you have all of your needed medical supplies or equipment (DME)?  (i.e. oxygen tank, CPAP, cane, etc.): Yes  Discharge follow-up appointment scheduled within 14 calendar days? : No  Is patient agreeable to assistance with scheduling? : No (RN will get patient to Cardiology to get question answered regarding medication and patient will call back to schedule with PCP.)         Post-op (Clinicians Only)  Did the patient have surgery or a procedure: Yes  Drainage: No  Bleeding: none  Fever: No  Chills: No (Last night, nothing currently.)  Redness: No  Warmth: No  Swelling: No  Incision site pain: No  Eating & Drinking: eating and drinking without complaints/concerns  PO Intake: other (Combination Diet 2 gm NA Diet)  Additional Symptoms:  (Denies)  Bowel Function:  (Stool in colostomy bag, normal)  Urinary Status: voiding without complaint/concerns    Care Management   Community Health Worker Initial Outreach    CHW Initial Information Gathering:  Referral  Source: IP Report  Preferred Hospital: [facility], Rew  [phone]  Preferred Urgent Care: [facility], [phone]  Current living arrangement:: I live in a private home, I live alone  Type of residence:: [facility] home  Community Resources: Home Care  Supplies Currently Used at Home: Other, Diabetic Supplies, Incontinence Supplies, Wound Care Supplies, Compression Stockings (Blood Pressure Cuff)  Equipment Currently Used at Home: walker, rolling, walker, standard, cane, quad, cane, straight, colostomy/ostomy supplies, commode chair, glucometer, tub bench (Grab Bars by doors from garage into home.)  Informal Support system:: Children, Family, Neighbors  No PCP office visit in Past Year: No  Transportation means:: [employer]       Patient accepts CC: Yes. Patient scheduled for assessment with ULICES Lopez on 11/22/2023 at 3:00pm. Patient noted desire to discuss help around the house such as a housekeeping/chore agency.  She has trouble cleaning the floors and vacuuming. She is also looking to update her walker that she uses outside of the home.     Patient was warm transferred to Cardiology to get advice regarding low blood pressures and new medication. Patient is currently asymptomatic therefore triage not completed at this time.  Patient was given 24/7 nurse line/scheduling information.     1 hour and 26 minutes was spend in coordination of care for patient.     PLAN                                                      Outpatient Plan:  Follow up with primary care provider, Shola Benoit MD, within   7 days for hospital follow- up.  No follow up labs or test are needed.     Follow up with cardiology as scheduled.        Future Appointments   Date Time Provider Department Center   11/24/2023 11:45 AM Sharon Christy OT [facility]   11/27/2023 11:45 AM Sharon Christy OT [facility]   12/5/2023 11:45 AM Sharon Christy OT [facility]    12/11/2023 10:30 AM Yolanda Tapia PA-C [facility]   12/12/2023 11:45 AM Macoskey, Sharon, OT [facility]   12/19/2023 10:45 AM Macoskey, Sharon, OT [facility]   12/27/2023 11:45 AM Macoskey, Sharon, OT [facility]   1/15/2024 11:20 AM Renetta Meyer MD [facility]   1/16/2024 11:00 AM Lupe Mcrae PA-C [facility]   1/22/2024 10:45 AM RU LAB [facility]   1/24/2024  1:15 PM Ankit Bhatia MD [facility] PSA CLIN   2/16/2024 11:00 AM Shola Benoit MD [facility]   3/25/2024 10:30 AM Yolanda Tapia PA-C [facility]   7/3/2024 11:00 AM Odette Weston MD [facility]         For any urgent concerns, please contact our 24 hour nurse triage line: [phone] ([phone])         Radha Lu RN

## 2023-11-20 LAB
ATRIAL RATE - MUSE: 77 BPM
DIASTOLIC BLOOD PRESSURE - MUSE: NORMAL MMHG
INTERPRETATION ECG - MUSE: NORMAL
P AXIS - MUSE: 79 DEGREES
PR INTERVAL - MUSE: 176 MS
QRS DURATION - MUSE: 88 MS
QT - MUSE: 382 MS
QTC - MUSE: 432 MS
R AXIS - MUSE: -10 DEGREES
SYSTOLIC BLOOD PRESSURE - MUSE: NORMAL MMHG
T AXIS - MUSE: 99 DEGREES
VENTRICULAR RATE- MUSE: 77 BPM

## 2023-11-20 NOTE — COMMUNITY RESOURCES LIST (ENGLISH)
11/20/2023   [facility]  N/A  For questions about this resource list or additional care needs, please contact your primary care clinic or care manager.  Phone: [phone]   Email: N/A   Address: [address] [place] [address]   Hours: N/A        Transportation       Free or low-cost transportation  1  SeeToo. Distance: 4.07 miles      In-Person   [address] [facility] [address] [place] [address]  Language: English  Hours: Mon - Fri 7:00 AM - 5:00 PM  Fees: Free   Phone: (816) 699-9668 Email: [email or web address]@[email or web address] Website: https://Good Thing/     2  [employer] Distance: 10.46 miles      [address] [place] [address]  Language: English, Tongan, Hmong, Qatari, Mauritian  Hours: Mon - Sun 5:00 AM - 10:00 PM  Fees: Self Pay   Phone: (930) 583-5002 Email: maximalcare_mobility@[employer] Website: https://www.[facility].info/Providers/Maximal_Care_Mobility_LLC/Transportation/1?pos=9     Transportation to medical appointments  3  [place] Mob[employer] Distance: 6.93 miles      In-Person   [address] [place] [address]  Language: Thai, English, Oromo, Qatari  Hours: Mon - Sat 5:00 AM - 9:00 PM  Fees: Insurance, Self Pay   Phone: (975) 787-1202 Email: info@[employer] Website: http://www.[employer]/     4  Assisted Transport Distance: 9.46 miles      In-Person   [address] [place] [address]  Language: English, Mauritian  Hours: Mon - Sun Appt. Only  Fees: Self Pay   Phone: (712) 731-9473 Email: cami@[employer] Website: http://www.[employer]/          Important Numbers & Websites       Emergency Services   911  City Services   311  Poison Control   (412) 310-1495  Suicide Prevention Lifeline   (633) 279-7654 (TALK)  Child Abuse Hotline   (980) 345-4753 (4-A-Child)  Sexual Assault Hotline   (159) 197-4010 (HOPE)  National Runaway Safeline   (298) 278-3786  (RUNAWAY)  All-Options Talkline   (719) 291-2330  Substance Abuse Referral   (433) 325-7456 (HELP)

## 2023-11-21 ENCOUNTER — OFFICE VISIT (OUTPATIENT)
Dept: FAMILY MEDICINE | Facility: CLINIC | Age: 78
End: 2023-11-21
Payer: MEDICARE

## 2023-11-21 VITALS
HEART RATE: 61 BPM | SYSTOLIC BLOOD PRESSURE: 140 MMHG | WEIGHT: 208.9 LBS | DIASTOLIC BLOOD PRESSURE: 70 MMHG | HEIGHT: 60 IN | TEMPERATURE: 97.2 F | RESPIRATION RATE: 15 BRPM | OXYGEN SATURATION: 100 % | BODY MASS INDEX: 41.01 KG/M2

## 2023-11-21 DIAGNOSIS — S81.802A OPEN WOUND OF LEFT KNEE, LEG, AND ANKLE, INITIAL ENCOUNTER: ICD-10-CM

## 2023-11-21 DIAGNOSIS — I87.2 VENOUS INSUFFICIENCY: ICD-10-CM

## 2023-11-21 DIAGNOSIS — S91.002A OPEN WOUND OF LEFT KNEE, LEG, AND ANKLE, INITIAL ENCOUNTER: ICD-10-CM

## 2023-11-21 DIAGNOSIS — S81.002A OPEN WOUND OF LEFT KNEE, LEG, AND ANKLE, INITIAL ENCOUNTER: ICD-10-CM

## 2023-11-21 DIAGNOSIS — I10 BENIGN ESSENTIAL HYPERTENSION: ICD-10-CM

## 2023-11-21 DIAGNOSIS — Z09 HOSPITAL DISCHARGE FOLLOW-UP: Primary | ICD-10-CM

## 2023-11-21 PROCEDURE — 99495 TRANSJ CARE MGMT MOD F2F 14D: CPT | Performed by: PHYSICIAN ASSISTANT

## 2023-11-21 RX ORDER — RESPIRATORY SYNCYTIAL VIRUS VACCINE 120MCG/0.5
0.5 KIT INTRAMUSCULAR ONCE
Qty: 1 EACH | Refills: 0 | Status: CANCELLED | OUTPATIENT
Start: 2023-11-21 | End: 2023-11-21

## 2023-11-21 RX ORDER — AMLODIPINE BESYLATE 2.5 MG/1
2.5 TABLET ORAL DAILY
Qty: 90 TABLET | Refills: 1 | Status: SHIPPED | OUTPATIENT
Start: 2023-11-21 | End: 2024-02-16

## 2023-11-21 RX ORDER — AMLODIPINE BESYLATE 5 MG/1
2.5 TABLET ORAL DAILY
Qty: 30 TABLET | Refills: 3 | Status: SHIPPED | OUTPATIENT
Start: 2023-11-21 | End: 2023-11-21

## 2023-11-21 ASSESSMENT — PAIN SCALES - GENERAL: PAINLEVEL: SEVERE PAIN (7)

## 2023-11-21 NOTE — PROGRESS NOTES
Assessment & Plan     Hospital discharge follow-up  Reviewed most recent hospitalization.  No follow-up required tests.  Upcoming follow-up with PCP in January.  See below for further work-up.    Benign essential hypertension  Sent 2.5 mg amlodipine tablets.  Recommended nighttime dosing.  Continue to monitor at home.    - amLODIPine (NORVASC) 2.5 MG tablet  Dispense: 90 tablet; Refill: 1  Open wound of left knee, leg, and ankle, initial encounter  Venous insufficiency  Follow-up wound clinic/lymphedema clinic.    30 minutes spent by me on the date of the encounter doing chart review, review of test results, interpretation of tests, patient visit, and documentation      MED REC REQUIRED  Post Medication Reconciliation Status: discharge medications reconciled and changed, per note/orders    The likelihood of other entities in the differential is insufficient to justify any further testing for them at this time. This was explained to the patient. The patient was advised that persistent or worsening symptoms would require further evaluation. Patient advised to call the office and if unable to reach to go to the emergency room if they develop any new or worsening symptoms. Expressed understanding and agreement with above stated plan.     KOKI Jones [facility] LEE ANN Cochran is a 78 year old, presenting for the following health issues:  Hospital F/U    Here today for hospital discharge follow-up.  Recovering well since hospital.   Elevated troponin resulted in coronary angiography which revealed mild nonobstructing disease.  Persistent hypertension.  Prescribed 5 mg of amlodipine.  Notes blood pressures in the 80s/40s.  Associated dizziness.  No falls.  Recommended to break the 5 mg tablets in half but she has had difficulty doing.  However, has improved her symptoms.  Take in the morning with additional blood pressure medications.    Left lower extremity wound improving.   Continued drainage.  Suspected venous stasis ulcer.  Following with wound care/lymphedema clinic.        11/18/2023     3:18 PM   Post Discharge Outreach   Admission Date 11/16/2023   Reason for Admission 1. Acute chest pain  R07.9       2. Venous stasis ulcer of left lower leg with edema of left lower leg (H)   Discharge Date 11/17/2023   Discharge Diagnosis Minimally elevated troponin, likely due to demand ischemia from hypertension  History of typical anginal chest pain and chest pain with atypical symptoms.  Mild nonobstructive coronary artery disease   Resistant hypertension likely contributing to exertional symptoms  Mild anemia, stable  Left lower extremity wound  CKD 3   Dyslipidemia with statin intolerance on zetia and fenofibrate  Post-polio syndrome  History of DVT and PE  Hypothyroidism  DMT2   How are you doing now that you are home? Patient states she is doing pretty good. States she took her medications. Felt a little light headed and shakey about an 2-2.5 hours after taking the amlodipine.  Blood pressure 2-2.5 hours after taking medications was 80/44 and pulse was 68.  Patient states right now she feels better. She has been sitting in her recliner since.  Blood pressure on the phone 130/67 and pulse is 64. Patient states she had a headache this morning, this has gone away since her nap.   How are your symptoms? (Red Flag symptoms escalate to triage hotline per guidelines) Worsening;New   Do you feel your condition is stable enough to be safe at home until your provider visit? Yes   Does the patient have their discharge instructions?  Yes   Does the patient have questions regarding their discharge instructions?  No   Were you started on any new medications or were there changes to any of your previous medications?  Yes   Does the patient have all of their medications? Yes   Do you have questions regarding any of your medications?  Yes (see comment)   Do you have all of your needed medical supplies or  equipment (DME)?  (i.e. oxygen tank, CPAP, cane, etc.) Yes   Discharge follow-up appointment scheduled within 14 calendar days?  No     Hospital Follow-up Visit:    Hospital/Nursing Home/IP Rehab Facility: [facility]  Date of Admission: 11/16/23    Date of Discharge: 11/17/23  Reason(s) for Admission: Minimally elevated troponin, likely due to demand ischemia from hypertension  History of typical anginal chest pain and chest pain with atypical symptoms.  Mild nonobstructive coronary artery disease   Resistant hypertension likely contributing to exertional symptoms  Mild anemia, stable  Left lower extremity wound  CKD 3   Dyslipidemia with statin intolerance on zetia and fenofibrate  Post-polio syndrome  History of DVT and PE  Hypothyroidism  DMT2    Was your hospitalization related to COVID-19? No   Problems taking medications regularly:  None  Medication changes since discharge: None  Problems adhering to non-medication therapy:  None    Summary of hospitalization:  [facility] discharge summary reviewed  Diagnostic Tests/Treatments reviewed.  Follow up needed: none  Other Healthcare Providers Involved in Patient s Care:         Update since discharge: improved.         Plan of care communicated with patient           [facility]  Hospitalist Discharge Summary       Date of Admission:  11/16/2023  Date of Discharge:  11/17/2023  Discharging Provider: Micha [place] MD  Discharge Service: Hospitalist Service        Discharge Diagnoses  Minimally elevated troponin, likely due to demand ischemia from hypertension  History of typical anginal chest pain and chest pain with atypical symptoms.  Mild nonobstructive coronary artery disease   Resistant hypertension likely contributing to exertional symptoms  Mild anemia, stable  Left lower extremity wound  CKD 3   Dyslipidemia with statin intolerance on zetia and fenofibrate  Post-polio syndrome  History  of DVT and PE  Hypothyroidism  DMT2      Hospital Course  Chasity Hodgson is a 78 year old female with PMH of DVT, PE, HTN, CAD, CKD, DM2, hypothyroidism, who presents with atypical chest pain and elevated troponin.  She was seen by cardiology who felt coronary angiography was warranted.  Coronary angiogram demonstrated mild nonobstructive coronary disease.  She was started on amlodipine 5 mg daily in addition to her prior antihypertensive regimen.  She will discharge home.    Clinically Significant Risk Factors      # DMII: A1C = 8.4 % (Ref range: 0.0 - 5.6 %) within past 6 months  # Obesity: Estimated body mass index is 39.65 kg/m  as calculated from the following:    Height as of an earlier encounter on 11/16/23: 1.524 m (5').    Weight as of this encounter: 92.1 kg (203 lb).       Follow-ups Needed After Discharge  Follow-up Appointments     Follow-up and recommended labs and tests       Follow up with primary care provider, Shola Benoit MD, within   7 days for hospital follow- up.  No follow up labs or test are needed.     Follow up with cardiology as scheduled.        Review of Systems   Constitutional, HEENT, cardiovascular, pulmonary, GI, , musculoskeletal, neuro, skin, endocrine and psych systems are negative, except as otherwise noted.      Objective    BP (!) 140/70   Pulse 61   Temp 97.2  F (36.2  C) (Temporal)   Resp 15   Ht 1.524 m (5')   Wt 94.8 kg (208 lb 14.4 oz)   SpO2 100%   BMI 40.80 kg/m    Body mass index is 40.8 kg/m .  Physical Exam   GENERAL: healthy, alert and no distress  EYES: Eyes grossly normal to inspection, PERRL and conjunctivae and sclerae normal  NECK: no adenopathy, no asymmetry, masses, or scars and thyroid normal to palpation  RESP: lungs clear to auscultation - no rales, rhonchi or wheezes  CV: regular rate and rhythm, normal S1 S2, no S3 or S4, no murmur, click or rub  MS: no gross musculoskeletal defects noted.  Bilateral lower extremity edema.  SKIN: no  suspicious lesions or rashes  NEURO: Normal strength and tone, mentation intact and speech normal  PSYCH: mentation appears normal, affect normal/bright

## 2023-11-21 NOTE — COMMUNITY RESOURCES LIST (ENGLISH)
11/21/2023   [facility]  N/A  For questions about this resource list or additional care needs, please contact your primary care clinic or care manager.  Phone: [phone]   Email: N/A   Address: [address] [place] [address]   Hours: N/A        Transportation       Free or low-cost transportation  1  [employer]. Distance: 4.07 miles      In-Person   [address] [facility] [address] [place] [address]  Language: English  Hours: Mon - Fri 7:00 AM - 5:00 PM  Fees: Free   Phone: (889) 132-6228 Email: [email or web address] Website: https://[employer]/     2  [employer] Mobility Distance: 10.46 miles      [address] [place] [address]  Language: English, Bolivian, Hmong, Burmese, Bermudian  Hours: Mon - Sun 5:00 AM - 10:00 PM  Fees: Self Pay   Phone: (575) 732-8080 Email: maximalcare_mobility@[employer] Website: https://www.[facility].info/Providers/Maximal_Care_Mobility_LLC/Transportation/1?pos=9     Transportation to medical appointments  3  [place] Mobility Distance: 6.93 miles      In-Person   [address] [place] [address]  Language: Turkmen, English, Oromo, Burmese  Hours: Mon - Sat 5:00 AM - 9:00 PM  Fees: Insurance, Self Pay   Phone: (705) 511-8789 Email: info@[employer] Website: http://www.[employer]/     4  Assisted Transport Distance: 9.46 miles      In-Person   [address] [place] [address]  Language: English, Bermudian  Hours: Mon - Sun Appt. Only  Fees: Self Pay   Phone: (205) 534-9119 Email: cami@[employer] Website: http://www.[employer]/          Important Numbers & Websites       Emergency Services   911  City Services   311  Poison Control   (602) 391-5462  Suicide Prevention Lifeline   (836) 735-1222 (TALK)  Child Abuse Hotline   (720) 285-2926 (4-A-Child)  Sexual Assault Hotline   (241) 539-9844 (HOPE)  National Runaway Safeline   (900) 394-4891  (RUNAWAY)  All-Options Talkline   (759) 113-6579  Substance Abuse Referral   (562) 841-8990 (HELP)

## 2023-11-22 ENCOUNTER — TELEPHONE (OUTPATIENT)
Dept: FAMILY MEDICINE | Facility: CLINIC | Age: 78
End: 2023-11-22

## 2023-11-22 ENCOUNTER — TELEPHONE (OUTPATIENT)
Dept: CARDIOLOGY | Facility: CLINIC | Age: 78
End: 2023-11-22

## 2023-11-22 NOTE — TELEPHONE ENCOUNTER
Home Care is calling regarding an established patient with [facility].       Requesting orders from: Shola Benoit  Provider is following patient: Yes  Is this a 60-day recertification request?  No    Orders Requested    Occupational Therapy lymphedema treatment  Request for initial certification (first set of orders)   Frequency:  2x/wk for 2  wks and then 1x/wk for 2 wks   For lymphedema treatment     Confirmed ok to leave a detailed message with call back.  Contact information confirmed and updated as needed.    Kavita Patel RN

## 2023-11-22 NOTE — TELEPHONE ENCOUNTER
"Patient was admitted to [facility] on 11/16/23 with atypical chest pain and elevated troponin.     PMH: DVT, PE, HTN, CAD, CKD, DM2, hypothyroidism.     11/17/23: Echo showed EF of 60-65%.The aortic valve is trileaflet with aortic valve sclerosis. No hemodynamically significant valvular aortic stenosis. The right ventricle is normal in structure, function and size.    11/17/23: Coronary angiogram via RFA showed non obstructive CAD.    Per Dr. Ely's IP note, \"Called re: acute pain in right groin. There is no hematoma. The is a bruit.  BP is elevated.     Holding manual pressure  Hydralazine 10mg IV given  Amlodipine 5mg PO was previously ordered and will be given.  Stat RLE arterial US ordered. Concern for pseudoaneurysm.        Paulina Ely MD on 11/17/2023 at 2:48 PM     Addendum: no pseudoaneurysm on ultrasound.  Please maintain SBP <150mmHg. I have updated hydralazine PRN order to 10-20mg IV Q4hrs for SBP >150mmHg.   Cardiology signing off.     Paulina Ely MD on 11/17/2023 at 3:57 PM\"    Pt was started on Norvasc at time of discharge.    Pt is scheduled for labs on 1/22/24 at 1045 in [place] and an OV on 1/24/24 at 1305 with Dr. Bhatia at our [place] Office.    Writer attempted to call pt for a cardiology post discharge phone call, but no answer. VM left to call back with any non emergent cardiac related or medication questions. Reminder left that RFA access site should be healing without signs of infection, swelling or bleeding. Reminder left of scheduled appts as above. Writer's phone number provided. IRMA Wills RN.      "

## 2023-11-27 ENCOUNTER — HOSPITAL ENCOUNTER (EMERGENCY)
Facility: CLINIC | Age: 78
Discharge: HOME | End: 2023-11-27
Attending: EMERGENCY MEDICINE | Admitting: EMERGENCY MEDICINE
Payer: MEDICARE

## 2023-11-27 ENCOUNTER — APPOINTMENT (OUTPATIENT)
Dept: ULTRASOUND IMAGING | Facility: CLINIC | Age: 78
End: 2023-11-27
Attending: EMERGENCY MEDICINE
Payer: MEDICARE

## 2023-11-27 ENCOUNTER — PATIENT OUTREACH (OUTPATIENT)
Dept: CARE COORDINATION | Facility: CLINIC | Age: 78
End: 2023-11-27
Payer: MEDICARE

## 2023-11-27 VITALS
RESPIRATION RATE: 18 BRPM | HEART RATE: 81 BPM | TEMPERATURE: 96.5 F | OXYGEN SATURATION: 100 % | DIASTOLIC BLOOD PRESSURE: 81 MMHG | SYSTOLIC BLOOD PRESSURE: 178 MMHG

## 2023-11-27 DIAGNOSIS — R10.31 RIGHT GROIN PAIN: ICD-10-CM

## 2023-11-27 LAB
ACANTHOCYTES BLD QL SMEAR: NORMAL
ANION GAP SERPL CALCULATED.3IONS-SCNC: 13 MMOL/L (ref 7–15)
AUER BODIES BLD QL SMEAR: NORMAL
BASO STIPL BLD QL SMEAR: NORMAL
BASOPHILS # BLD AUTO: 0.1 10E3/UL (ref 0–0.2)
BASOPHILS NFR BLD AUTO: 1 %
BITE CELLS BLD QL SMEAR: NORMAL
BLISTER CELLS BLD QL SMEAR: NORMAL
BUN SERPL-MCNC: 35.1 MG/DL (ref 8–23)
BURR CELLS BLD QL SMEAR: NORMAL
CALCIUM SERPL-MCNC: 8.8 MG/DL (ref 8.8–10.2)
CHLORIDE SERPL-SCNC: 106 MMOL/L (ref 98–107)
CREAT SERPL-MCNC: 1.19 MG/DL (ref 0.51–0.95)
DACRYOCYTES BLD QL SMEAR: NORMAL
DEPRECATED HCO3 PLAS-SCNC: 21 MMOL/L (ref 22–29)
EGFRCR SERPLBLD CKD-EPI 2021: 47 ML/MIN/1.73M2
ELLIPTOCYTES BLD QL SMEAR: NORMAL
EOSINOPHIL # BLD AUTO: 0.3 10E3/UL (ref 0–0.7)
EOSINOPHIL NFR BLD AUTO: 4 %
ERYTHROCYTE [DISTWIDTH] IN BLOOD BY AUTOMATED COUNT: 12.6 % (ref 10–15)
FRAGMENTS BLD QL SMEAR: NORMAL
GLUCOSE SERPL-MCNC: 136 MG/DL (ref 70–99)
HCT VFR BLD AUTO: 36 % (ref 35–47)
HGB BLD-MCNC: 11.6 G/DL (ref 11.7–15.7)
HGB C CRYSTALS: NORMAL
HOLD SPECIMEN: NORMAL
HOLD SPECIMEN: NORMAL
HOWELL-JOLLY BOD BLD QL SMEAR: NORMAL
IMM GRANULOCYTES # BLD: 0.1 10E3/UL
IMM GRANULOCYTES NFR BLD: 1 %
LYMPHOCYTES # BLD AUTO: 2 10E3/UL (ref 0.8–5.3)
LYMPHOCYTES NFR BLD AUTO: 28 %
MCH RBC QN AUTO: 30.9 PG (ref 26.5–33)
MCHC RBC AUTO-ENTMCNC: 32.2 G/DL (ref 31.5–36.5)
MCV RBC AUTO: 96 FL (ref 78–100)
MONOCYTES # BLD AUTO: 0.6 10E3/UL (ref 0–1.3)
MONOCYTES NFR BLD AUTO: 8 %
NEUTROPHILS # BLD AUTO: 4.3 10E3/UL (ref 1.6–8.3)
NEUTROPHILS NFR BLD AUTO: 58 %
NEUTS HYPERSEG BLD QL SMEAR: NORMAL
NRBC # BLD AUTO: 0 10E3/UL
NRBC BLD AUTO-RTO: 0 /100
PLAT MORPH BLD: NORMAL
PLATELET # BLD AUTO: 187 10E3/UL (ref 150–450)
POLYCHROMASIA BLD QL SMEAR: NORMAL
POTASSIUM SERPL-SCNC: 4.3 MMOL/L (ref 3.4–5.3)
RBC # BLD AUTO: 3.76 10E6/UL (ref 3.8–5.2)
RBC AGGLUT BLD QL: NORMAL
RBC MORPH BLD: NORMAL
ROULEAUX BLD QL SMEAR: NORMAL
SICKLE CELLS BLD QL SMEAR: NORMAL
SMUDGE CELLS BLD QL SMEAR: NORMAL
SODIUM SERPL-SCNC: 140 MMOL/L (ref 135–145)
SPHEROCYTES BLD QL SMEAR: NORMAL
STOMATOCYTES BLD QL SMEAR: NORMAL
TARGETS BLD QL SMEAR: NORMAL
TOXIC GRANULES BLD QL SMEAR: NORMAL
VARIANT LYMPHS BLD QL SMEAR: NORMAL
WBC # BLD AUTO: 7.3 10E3/UL (ref 4–11)

## 2023-11-27 PROCEDURE — 80048 BASIC METABOLIC PNL TOTAL CA: CPT | Performed by: EMERGENCY MEDICINE

## 2023-11-27 PROCEDURE — 93971 EXTREMITY STUDY: CPT | Mod: RT

## 2023-11-27 PROCEDURE — 36415 COLL VENOUS BLD VENIPUNCTURE: CPT | Performed by: EMERGENCY MEDICINE

## 2023-11-27 PROCEDURE — 99285 EMERGENCY DEPT VISIT HI MDM: CPT | Mod: 25

## 2023-11-27 PROCEDURE — 85025 COMPLETE CBC W/AUTO DIFF WBC: CPT | Performed by: EMERGENCY MEDICINE

## 2023-11-27 PROCEDURE — 93926 LOWER EXTREMITY STUDY: CPT

## 2023-11-27 PROCEDURE — 93005 ELECTROCARDIOGRAM TRACING: CPT

## 2023-11-27 ASSESSMENT — ACTIVITIES OF DAILY LIVING (ADL): ADLS_ACUITY_SCORE: 40

## 2023-11-27 NOTE — TELEPHONE ENCOUNTER
Home Care Contact - Florencia OT    Attempt # 1    Was call answered?  No. No identifiers present on voicemail. Left message requesting for Florencia to callback to triage.     Shravan Pollard RN  [facility]

## 2023-11-27 NOTE — PROGRESS NOTES
Clinic Care Coordination Contact    Patient Called Back.    Rescheduled phone assessment with BAKARI ELENA on 12/5/23 at 2:00pm.    Patient stated she would like to apply for county benefits / PCA services.  Patient stated she would like help with housekeeping.    Patient stated she would like to discuss with BAKARI ELENA about POA and bank account info.  Patient's  is in a nursing home.    HERMANN Hwang  Clinic Care Coordination  [facility]: [place], and [place] [facility]  Phone: [phone]

## 2023-11-27 NOTE — PROGRESS NOTES
Clinic Care Coordination Contact  [facility]/Voicemail    The Community Health Worker called for a Care Coordination outreach to reschedule phone assessment with BAKARI ELENA .  Appointment is currently scheduled for 11/29/23 at 3:00pm.        Clinical Data: Care Coordinator Outreach    Outreach Documentation Number of Outreach Attempt   11/27/2023  11:09 AM 1       Left message on patient's voicemail with call back information and requested return call.    Plan: Care Coordinator will try to reach patient again in 1-2 business days.    HERMANN Hwang  Clinic Care Coordination  [facility]: [place], [place], [place], and [facility]  Phone: [phone]

## 2023-11-27 NOTE — ED TRIAGE NOTES
Angio completed on 11/17 through right groin. Pain in groin since day of angio, US completed that day and no clot of active bleeding noted. She had a follow up phone call today and she again mentioned the pain near groin and they recommended that she come to ER for eval. Patient denies any new bruising, swelling or bleeding from site. Patient is noted to be hypertensive in triage, 221/74. Patient reports taking all of her BP medications today. Baseline CMS intact in RLE, some deficits due to hx of polio. Denies chest pain and shortness of breath in triage.

## 2023-11-27 NOTE — TELEPHONE ENCOUNTER
"Writer returned pt's VM left at 1027 this AM.    Pt states she has consistently been experiencing \"severe groin\" pain to RFA access site since post procedure and discharge. Rates pain as 8/10 at worst and improved to 4/10 with daily Tylenol. States she has not had any bruising post procedure. Denies visible swelling, any N/T and denies any active bleeding to site. Pt had US post procedure with results as below.    Pt also states that she has been having lightheadedness with episodes, \"Of my heart racing and pounding at times.\" Pt was started on Norvasc at time of discharge. Noted her BP to be 88/44 with lightheadedness initially post discharge.     Saw her PMD this past Wed. Pt states he did not look at RFA access site and no lab work completed. Pt states he did decrease her Norvasc to 2.5 mg daily, and pt's BP has now been 130-140's/60's with continued episodes of lightheadedness and heart pounding.    Instructed pt that she needs to go to ED for re evaluation of RFA access site. Instructed pt to review all symptoms with ED MD, including severe groin pain, pounding HR with hx of post discharge hypotension and med changes.     Instructed I will send Dr. Bhatia and his Team RN a message to update them to follow up with pt tomorrow. Dr. Bhatia's Team RN phone number provided. Pt verbalized understanding and agreeable with plan of care. IRMA Wills RN.  "

## 2023-11-27 NOTE — TELEPHONE ENCOUNTER
Florencia called the clinic back and provider's message was relayed to her. She had no further questions at this time.    Rossana GOMEZ [facility] Triage Team

## 2023-11-28 LAB
ATRIAL RATE - MUSE: 71 BPM
DIASTOLIC BLOOD PRESSURE - MUSE: NORMAL MMHG
INTERPRETATION ECG - MUSE: NORMAL
P AXIS - MUSE: 72 DEGREES
PR INTERVAL - MUSE: 168 MS
QRS DURATION - MUSE: 96 MS
QT - MUSE: 396 MS
QTC - MUSE: 430 MS
R AXIS - MUSE: 7 DEGREES
SYSTOLIC BLOOD PRESSURE - MUSE: NORMAL MMHG
T AXIS - MUSE: 62 DEGREES
VENTRICULAR RATE- MUSE: 71 BPM

## 2023-11-28 NOTE — TELEPHONE ENCOUNTER
Called pt for update. Pt went to the ER yesterday as instructed for evaluation of her right groin pain. Reviewed ER notes, pt had normal duplex ultrasound of the right groin, there was no pseudoaneurysm or arterial venous fistula and she was discharged home. Pt stated she is still having pain that radiates from her groin down her entire right leg and plans to call her orthopedic doctor today. Pt noted she had similar pain prior to her left hip replacement and she would like them to order an MRI of her right hip. Pt stated she feels reassured that there was no bleeding or other issues from the angiogram. Per pt her BP was elevated in the ER, she checked at home this morning and reported /65 prior to medications. Requested pt continue to monitor BP at home and can bring log of readings to upcoming visit and pt agreed. Reviewed plan for follow up with Dr. Bhatia on 1/24/24 and requested pt call back sooner if needed.

## 2023-11-28 NOTE — ED PROVIDER NOTES
History     Chief Complaint:  Post-op Problem     HPI   Chasity Hodgson is a 78 year old female with a history of PE, DVT, type 2 diabetes who presents to the ED with a post-op problem and right sided groin pain. 4 days ago she had an angiogram of the heart at [facility] and about an hour and a half after began having groin pain. She reports that they did an ultrasound which was negative but after discharge her pain continued. She reports her angiogram showed minimal heart blockage.     Independent Historian:   None - Patient Only    Review of External Notes:   [facility] discharge summary November 17, 2023.  Dr. Martin.  This reviewed indications for angiography along with results.  There were no complications.    Medications:    Amlodipine   Carvediol   Jardiance   Aspirin   Carvedilol   Cyanocobalamin   Empagliflozin   Ezetimibe   Fenofibrate   Furosemide   Icosapent ethyl  Insulin  Levothyroxine  Minodyl  Nitroglycerine  Olmesartan   Pantoprazole   Sucralfate     Past Medical History:    Hypertriglyceridemia   Papillary carcinoma of thyroid   PE 2  Venous insufficiency  Postsurgical hypothyroidism   Hypertension   Fibromyalgia   Type 2 diabetes   ANNETTE   CPAP  DVT x2  Carotid stenosis  CAD  CKD stage 2   Hypothyroidism   GERD  Anemia   Pulmonary nodule   Hypokalemia   Alopecia   Papillary carcinoma of thyroid   Septic joint of right knee joint   Depression   Poliomyelitis   Venous insufficiency  Abdominal hernia   Mumps     Past Surgical History:    Freeing bowel adhesion, enterolysis   GI surgery   Colostomy   Laparotomy  Arthrodesis foot  Release tendon foot   Amputate toe   Appendectomy  Remove hardware foot  Cholecystectomy   Total abdomen hysterectomy   Arthroscopy shoulder rotator cuff repair   Release carpal tunnel   Cholecystectomy   Appendectomy   Hysterectomy total abdominal   Amputate toes   Tendon release  Colostomy   Thyroidectomy   Arthrodesis foot  Arthroscopy shoulder    Shoulder surgery   Biopsy  Breast surgery  Colonoscopy   Eye surgery   Hernia repair   Soft tissue surgery  Bladder surgery  Cystoscopy   Botox injection medical   Coronary angiogram  Angiogram renal bilateral      Physical Exam   Patient Vitals for the past 24 hrs:   BP Temp Temp src Pulse Resp SpO2   11/27/23 1925 (!) 178/81 -- -- 81 18 100 %   11/27/23 1702 (!) 221/74 -- -- -- -- --   11/27/23 1657 -- (!) 96.5  F (35.8  C) Temporal 62 16 100 %      Physical Exam  General: Patient is alert and cooperative.  Eyes: EOMI. Normal conjunctiva.  Neck:  Normal range of motion and appearance.   Cardiovascular:  Normal rate.   Pulmonary/Chest:  Effort normal.   Abdominal: Soft. No distension or tenderness.     Musculoskeletal: The right inguinal region is normal in appearance.  There is no palpable swelling or significant tenderness.  Neurological: oriented, normal strength, sensation, and coordination.   Skin: No bruising, erythema, or other overlying skin changes to the right groin region.  Psychiatric: Normal mood and affect. Normal behavior and judgement.    Emergency Department Course     Imaging:  Us Post Vascular Access Low Ext Duplex   Final Result   CONCLUSION:    Normal duplex ultrasound of the right groin. Specifically no identified pseudoaneurysm or arterial venous fistula.         US Lower Extremity Venous Duplex Right   Final Result   IMPRESSION:   1.  No deep venous thrombosis in the right lower extremity.         Laboratory:  Labs Ordered and Resulted from Time of ED Arrival to Time of ED Departure   BASIC METABOLIC PANEL - Abnormal       Result Value    Sodium 140      Potassium 4.3      Chloride 106      Carbon Dioxide (CO2) 21 (*)     Anion Gap 13      Urea Nitrogen 35.1 (*)     Creatinine 1.19 (*)     GFR Estimate 47 (*)     Calcium 8.8      Glucose 136 (*)    CBC WITH PLATELETS AND DIFFERENTIAL - Abnormal    WBC Count 7.3      RBC Count 3.76 (*)     Hemoglobin 11.6 (*)     Hematocrit 36.0      MCV 96       MCH 30.9      MCHC 32.2      RDW 12.6      Platelet Count 187      % Neutrophils 58      % Lymphocytes 28      % Monocytes 8      % Eosinophils 4      % Basophils 1      % Immature Granulocytes 1      NRBCs per 100 WBC 0      Absolute Neutrophils 4.3      Absolute Lymphocytes 2.0      Absolute Monocytes 0.6      Absolute Eosinophils 0.3      Absolute Basophils 0.1      Absolute Immature Granulocytes 0.1      Absolute NRBCs 0.0     RBC AND PLATELET MORPHOLOGY    Platelet Assessment        Value: Automated Count Confirmed. Platelet morphology is normal.    Acanthocytes        Liborio Rods        Basophilic Stippling        Bite Cells        Blister Cells        Priscilla Cells        Elliptocytes        Hgb C Crystals        Curtis-Jolly Bodies        Hypersegmented Neutrophils        Polychromasia        RBC agglutination        RBC Fragments        Reactive Lymphocytes        Rouleaux        Sickle Cells        Smudge Cells        Spherocytes        Stomatocytes        Target Cells        Teardrop Cells        Toxic Neutrophils        RBC Morphology Confirmed RBC Indices        Emergency Department Course & Assessments:       Interventions:  Medications - No data to display     Assessments:   I obtained history and examined the patient as noted above.     Independent Interpretation (X-rays, CTs, rhythm strip):  None    Consultations/Discussion of Management or Tests:  None        Social Determinants of Health affecting care:   None    Disposition:  The patient was discharged to home.     Impression & Plan    CMS Diagnoses: None    Medical Decision Makin-year-old female with right groin discomfort.  This is following a recent diagnostic angiogram at [facility] on .  She has no visible or palpable abnormalities to the right groin region and an ultrasound of the area shows no pseudoaneurysm, fistula, or evidence of DVT.  He is discharged with reassurance.    Diagnosis:    ICD-10-CM     1. Right groin pain  R10.31          Discharge Medications:  Discharge Medication List as of 11/27/2023  7:45 PM         Scribe Disclosure:  I, Nuris Cameron, am serving as a scribe at 6:53 PM on 11/27/2023 to document services personally performed by Shola Braun MD based on my observations and the provider's statements to me.     11/27/2023   Shola Braun MD Isaacson, Brian A, MD  11/27/23 5926

## 2023-12-05 ENCOUNTER — PATIENT OUTREACH (OUTPATIENT)
Dept: NURSING | Facility: CLINIC | Age: 78
End: 2023-12-05
Payer: COMMERCIAL

## 2023-12-05 ASSESSMENT — ACTIVITIES OF DAILY LIVING (ADL): DEPENDENT_IADLS:: TRANSPORTATION

## 2023-12-05 NOTE — LETTER
[facility]  Patient Centered Plan of Care  About Me:        Patient Name:  Chasity Brooke    YOB: 1945  Age:         78 year old   Jean MRN:    [id number] Telephone Information:  Home Phone [phone]   Mobile [phone]       Address:  [address] [place] [address] Email address:  [email or web address]      Emergency Contact(s)    Name Relationship Lgl Grd Work Phone Home Phone Mobile Phone   1. LETICIA GALLEGOS Daughter No  [phone]   2. JOSE BROOKE Son No NONE [phone]   3. NONE Spouse No              Primary language:  English     needed? No   [facility]:  [phone] op. 1  Other communication barriers:None    Preferred Method of Communication:  [place]  Current living arrangement: I live in a private home    Mobility Status/ Medical Equipment: Independent w/Device        Health Maintenance  Health Maintenance Reviewed: Due/Overdue   Health Maintenance Due   Topic Date Due    URINE DRUG SCREEN  Never done    RSV VACCINE (Pregnancy & 60+) (1 - 1-dose 60+ series) Never done    ZOSTER IMMUNIZATION (2 of 3) 10/24/2016    DIABETIC FOOT EXAM  05/08/2019    MEDICARE ANNUAL WELLNESS VISIT  10/12/2023           My Access Plan  Medical Emergency 911   Primary Clinic Line [facility] - [phone]   24 Hour Appointment Line [phone] or  [phone] (toll-free)   24 Hour Nurse Line [phone] (toll-free)   Preferred Urgent Care [facility], [phone]     Preferred Hospital [facility]  [phone]     Preferred Pharmacy [facility] [place], [place] - [address]     Behavioral Health Crisis Line The National Suicide Prevention Lifeline at 1-180.732.9849 or Text/Call 368           My Care Team Members  Patient Care Team         Relationship Specialty Notifications Start End    Shola Benoit MD PCP -  General Internal Medicine  10/22/12     Phone: [phone] Pager: [phone] Fax: [phone] [address] [facility] [address]    Shola Benoit MD Assigned PCP   10/4/12     Phone: [phone] Pager: [phone] Fax: [phone] [address] [facility] [address]    Renetta Meyer MD MD Dermatology  4/17/15     Phone: [phone] Fax: [phone]         [address] [facility] [address] [facility] [address]    ROMI Roque MD MD INTERNAL MEDICINE - ENDOCRINOLOGY, DIABETES & METABOLISM  2/23/16     Phone: [phone] Fax: [phone]         ENDOCRINE CLINIC [facility] [address] [facility] [address]    Ulises Pantoja MD MD Gastroenterology  9/12/17     Phone: [phone] Fax: [phone]         [facility] [address] [address]    Kishore Ferrera MD MD Orthopedics  2/23/18     Phone: [phone] Fax: [phone]         [address] [address] [facility] [address]    Tara Cornejo RN Diabetes Educator Diabetes Education  8/22/19     Phone: [phone] Fax: [phone]        Catrachita Prather, PharmD Pharmacist Pharmacist  7/26/20     Phone: [phone] Fax: [phone] [address] [facility] [address]    Odette Weston MD MD Endocrinology, Diabetes, and Metabolism  9/13/21     Phone: [phone] Fax: [phone]         [facility] [place] [address]    Odette Weston MD Assigned Endocrinology Provider   10/3/21     Phone: [phone] Fax: [phone]         [facility] [place] [address]    Goltz, Bennett Ezra, PA-C Assigned Neuroscience Provider   9/17/22     Phone: [phone] Fax: [phone] [address] [facility] [address]    Terra Bridges PA-C Physician Assistant Urology  10/14/22     Phone: [phone] Fax: [phone]         [address] [facility] [address]    Amber Chan APRN CNP Assigned Heart  and Vascular Provider   4/8/23     Phone: [phone] Fax: [phone] [address] [facility] [address]    Marbella Martinez PA-C Physician Assistant Urology  5/23/23     Phone: [phone] Fax: [phone]         [address] [facility] [address]    Yolanda Tapia PA-C Physician Assistant Endocrinology, Diabetes, and Metabolism  6/9/23     Phone: [phone] Fax: [phone]         [address] [facility] [address]    Mishel Zendejas MD Assigned Surgical Provider   8/26/23     Phone: [phone] Fax: [phone]         [address] [address]    Kaelyn Hennessy [facility] Lead Care Coordinator  Admissions 11/27/23                 My Care Plans  Self Management and Treatment Plan    Care Plan      Action Plans on File:                       Advance Care Plans/Directives:   Advanced Care Plan/Directives on file:   Yes    Status of Document(s):   On File and Validated    Advanced Care Plan/Directives Type:   Advanced Directive - On File           My Medical and Care Information  Problem List   Patient Active Problem List   Diagnosis    Low back pain    Mixed hyperlipidemia    Benign essential hypertension    Fibromyalgia    Allergic rhinitis    ANNETTE (obstructive sleep apnea)    Cavovarus deformity of foot    History of DVT (deep vein thrombosis)    Hypokalemia    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)    Anemia due to blood loss, acute    ACP (advance care planning)    Postsurgical hypothyroidism    Type 2 diabetes, HbA1c goal < 7% (H)    Gastroparesis    Papillary carcinoma of thyroid (H)    Alopecia    Pulmonary nodule    Esophageal reflux    Dermatitis    Acne rosacea    Diabetic gastroparesis (H)    Poliomyelitis    Left knee pain    Carotid stenosis    Right shoulder pain    Type 2 diabetes mellitus with other specified complication (H)    Insufficiency, arterial, peripheral (H24)    Allergic dermatitis due to other chemical product    Normocytic anemia    Morbid  obesity with BMI of 40.0-44.9, adult (H)    Mild major depression (H)    CKD (chronic kidney disease) stage 3, GFR 30-59 ml/min (H)    Renal artery stenosis (H24)    Neuropathic pain    Mild major depression (H24)    Venous insufficiency    Avascular necrosis of bone of hip, left (H)    Contact dermatitis    Diabetic nephropathy associated with type 2 diabetes mellitus (H)    DVT of upper extremity (deep vein thrombosis) (H)    Dysphagia    Edema of lower extremity    History of colonic polyps    History of pulmonary embolus (PE)    Iron deficiency anemia    Inguinal pain    Moderate protein-calorie malnutrition (H24)    Osteoarthritis of left hip    Primary insomnia    Right lower quadrant pain    Status post total shoulder arthroplasty, left    Surgical aftercare, musculoskeletal system    Vitamin D deficiency    Lymphedema    Atypical chest pain    Episodic tension-type headache, not intractable    Blurred vision    Pain of cheek    Abdominal adhesions    Mixed incontinence    OAB (overactive bladder)    Cellulitis of lower extremity, unspecified laterality    Cellulitis    Acute chest pain    Venous stasis ulcer of left lower leg with edema of left lower leg (H)      Current Medications and Allergies:  See printed Medication Report.    Care Coordination Start Date: 11/27/2023   Frequency of Care Coordination: monthly, more frequently as needed     Form Last Updated: 12/05/2023

## 2023-12-05 NOTE — PROGRESS NOTES
Clinic Care Coordination Contact  Clinic Care Coordination Contact  OUTREACH    Referral Information:  Referral Source: PCP  Primary Diagnosis: Psychosocial    SW spoke to pt about her goals and overall wellbeing.  Pt lives alone in her home, and her  lives at [facility].  Pt states he has lived there for about 4 years and she is not very happy with the care there and  is on a waitlist to move into the [employer] where her daughter works.  Pt is happy in her home but is needing greater assitance to maintain her home as her mobility is decreasing.  Pt no longer drives, and uses [employer].  Pt shares that she may beging using [employer] to  groceries.  Pt shares that she is able to manage most of her ADL and IADLs, but is struggling to do some of the chores in her home.  Pt hopes that she would qualify for E/W but she is unsure.  SW attempted to explain the programs but it didn't seem that pt fully grasped that she MUST have Medical Assistance in order to qualify for the Elderly Waiver.    Pt would like to enroll in [facility] and would like to explore eligibility for E/W, and if she is not eligible, would like to explore private pay resources for chore services.    SW put in FRW referral.    SW added CHW for outreach.      Chief Complaint   Patient presents with    Clinic Care Coordination - Initial        Universal Utilization:   Clinic Utilization  Difficulty keeping appointments:: No  Compliance Concerns: No  No-Show Concerns: No  No PCP office visit in Past Year: No  Utilization      No Show Count (past year)  0             ED Visits  5             Hospital Admissions  2                    Current as of: 12/5/2023 10:44 AM                Clinical Concerns:  Current Medical Concerns:  Psychosocial     Current Behavioral Concerns: N/A    Education Provided to patient: CCC, DARRELL, E/W MA   Pain  Pain (GOAL):: No  Health Maintenance Reviewed: Due/Overdue   Health Maintenance Due   Topic Date  Due    URINE DRUG SCREEN  Never done    RSV VACCINE (Pregnancy & 60+) (1 - 1-dose 60+ series) Never done    ZOSTER IMMUNIZATION (2 of 3) 10/24/2016    DIABETIC FOOT EXAM  05/08/2019    MEDICARE ANNUAL WELLNESS VISIT  10/12/2023     Clinical Pathway: None    Medication Management:  Medication review status: Medications reviewed and no changes reported per patient.             Functional Status:  Dependent ADLs:: Ambulation-walker  Dependent IADLs:: Transportation  Bed or wheelchair confined:: No  Mobility Status: Independent w/Device  Fallen 2 or more times in the past year?: No  Any fall with injury in the past year?: No    Living Situation:  Current living arrangement:: I live in a private home  Type of residence:: [facility]    Lifestyle & Psychosocial Needs:    Social Determinants of Health     Food Insecurity: Low Risk  (11/20/2023)    Food Insecurity     Within the past 12 months, did you worry that your food would run out before you got money to buy more?: No     Within the past 12 months, did the food you bought just not last and you didn t have money to get more?: No   Depression: Not at risk (11/8/2023)    PHQ-2     PHQ-2 Score: 1   Housing Stability: Low Risk  (11/20/2023)    Housing Stability     Do you have housing? : Yes     Are you worried about losing your housing?: No   Tobacco Use: Low Risk  (11/21/2023)    Patient History     Smoking Tobacco Use: Never     Smokeless Tobacco Use: Never     Passive Exposure: Not on file   Financial Resource Strain: Low Risk  (11/20/2023)    Financial Resource Strain     Within the past 12 months, have you or your family members you live with been unable to get utilities (heat, electricity) when it was really needed?: No   Alcohol Use: Not on file   Transportation Needs: High Risk (11/20/2023)    Transportation Needs     Within the past 12 months, has lack of transportation kept you from medical appointments, getting your medicines, non-medical meetings or  appointments, work, or from getting things that you need?: Yes   Physical Activity: Not on file   Interpersonal Safety: Low Risk  (11/21/2023)    Interpersonal Safety     Do you feel physically and emotionally safe where you currently live?: Yes     Within the past 12 months, have you been hit, slapped, kicked or otherwise physically hurt by someone?: No     Within the past 12 months, have you been humiliated or emotionally abused in other ways by your partner or ex-partner?: No   Stress: Not on file   Social Connections: Not on file     Diet:: Diabetic diet  Inadequate nutrition (GOAL):: No  Tube Feeding: No  Inadequate activity/exercise (GOAL):: No  Transportation means:: [employer]     Congregational or spiritual beliefs that impact treatment:: No  Mental health DX:: No  Mental health management concern (GOAL):: No  Chemical Dependency Status: No Current Concerns  Informal Support system:: Children, Family, Friends, Neighbors          Resources and Interventions:  Current Resources:         Supplies Currently Used at Home: Diabetic Supplies, Compression Stockings, Wound Care Supplies  Equipment Currently Used at Home: walker, rolling, wheelchair, manual, tub bench, colostomy/ostomy supplies  Employment Status: retired         Advance Care Plan/Directive  Advanced Care Plans/Directives on file:: Yes  Status of record:: On File and Validated  Type Advanced Care Plans/Directives: Advanced Directive - On File    Referrals Placed: Community Resources         Care Plan:  Care Plan: Community Resources       Problem: Insufficient In-home support       Note:     Goal Statement: Sammie will continue working with Care Coordination to ensure her needs are met for overall health and wellbeing.  Date Goal Set: 12/5/23  Barriers: Limited finances  Strengths: Strong support system  Date to Achieve By: 12/5/24  Patient expressed understanding of goal: yes  Action steps to achieve this goal:  1. I will continue to follow up with  medical team as needed  2. I will work with FRW on discussing and applying for E/W program  4. I will consider options for chore services in my home from CCSW and CHW  5. I will outreach to Care Coordination  for further questions or concerns          Goal: Establish adequate home support                             Patient/Caregiver understanding: yes    Outreach Frequency: monthly, more frequently as needed  Future Appointments                In 6 days Yolanda Tapia PA-C [place], [place]    In 1 month Renetta Meyer MD [facility]    In 1 month Lupe Mcrae PA-C [facility] Urology Clinic [facility] UA PHY [place]    In 1 month RU LAB [facility] Heart [facility] RID    In 1 month Ankit Bhatia MD [facility] Heart [facility] PSA CLIN    In 2 months Shola Benoit MD [facility],     In 3 months Yolanda Tapia PA-C [place], [place]    In 7 months Odette Weston MD [facility] Specialty Clinic [place],             Plan: SW to follow up in one month.      Kaelyn Hennessy,  [facility]  Clinic Care Coordinator  [facility]  [phone]  [email or web address]

## 2023-12-05 NOTE — LETTER
[facility] CARE COORDINATION  [address]  [place] [address]    December 5, 2023    Chasity SU [facility]  [address] [place] [address]      Dear Chasity,    I am a clinic care coordinator who works with Shola Benoit MD, MD with the [facility]. I wanted to thank you for spending the time to talk with me.  Below is a description of clinic care coordination and how I can further assist you.       The clinic care coordination team is made up of a registered nurse, , financial resource worker and community health worker who understand the health care system. The goal of clinic care coordination is to help you manage your health and improve access to the health care system. Our team works alongside your provider to assist you in determining your health and social needs. We can help you obtain health care and community resources, providing you with necessary information and education. We can work with you through any barriers and develop a care plan that helps coordinate and strengthen the communication between you and your care team.  Our services are voluntary and are offered without charge to you personally.    Please feel free to contact me with any questions or concerns regarding care coordination and what we can offer.      We are focused on providing you with the highest-quality healthcare experience possible.    Sincerely,     Kaelyn Hennessy, [facility]  Clinic Care Coordinator  [facility]  [phone]

## 2023-12-07 ENCOUNTER — PATIENT OUTREACH (OUTPATIENT)
Dept: CARE COORDINATION | Facility: CLINIC | Age: 78
End: 2023-12-07
Payer: MEDICARE

## 2023-12-07 NOTE — PROGRESS NOTES
Clinic Care Coordination Contact  Program: NITA  County: [place] [facility] Case #:  [facility] Worker:   Nita #:   Subscriber #:   Renewal:  Date Applied:     FRW Outreach:   12/7/23:FRW called with [facility] and patient  and she informed them that she filled out the application with the county and that she just needs to send in some documentation and she is going to mail it today. FRW will follow up in 30 days  Lexie Louie   [phone]      Health Insurance:      Referral/Screening:  Row Name 12/05/23 1503       [facility] Benefits   Is patient requesting help applying for [facility] benefits? Yes       Have you recently applied for any [facility] benefits? No       How many people in your household? 1       Insurance:   [place]? No       Is this an insurance renewal? No       Is this a new insurance application request? No       OTHER   Is this a laura care application? No       Any other information for the FRW? Patient is interested in applying for elderly waiver.

## 2023-12-08 ENCOUNTER — MEDICAL CORRESPONDENCE (OUTPATIENT)
Dept: HEALTH INFORMATION MANAGEMENT | Facility: CLINIC | Age: 78
End: 2023-12-08
Payer: MEDICARE

## 2023-12-11 ENCOUNTER — OFFICE VISIT (OUTPATIENT)
Dept: ENDOCRINOLOGY | Facility: CLINIC | Age: 78
End: 2023-12-11
Payer: MEDICARE

## 2023-12-11 ENCOUNTER — TELEPHONE (OUTPATIENT)
Dept: ENDOCRINOLOGY | Facility: CLINIC | Age: 78
End: 2023-12-11

## 2023-12-11 VITALS
SYSTOLIC BLOOD PRESSURE: 178 MMHG | HEIGHT: 60 IN | TEMPERATURE: 97.9 F | BODY MASS INDEX: 40.25 KG/M2 | DIASTOLIC BLOOD PRESSURE: 69 MMHG | HEART RATE: 72 BPM | RESPIRATION RATE: 18 BRPM | WEIGHT: 205 LBS | OXYGEN SATURATION: 95 %

## 2023-12-11 DIAGNOSIS — C73 MALIGNANT NEOPLASM OF THYROID GLAND (H): Primary | ICD-10-CM

## 2023-12-11 DIAGNOSIS — E78.2 MIXED HYPERLIPIDEMIA: ICD-10-CM

## 2023-12-11 DIAGNOSIS — E11.9 TYPE 2 DIABETES, HBA1C GOAL < 7% (H): ICD-10-CM

## 2023-12-11 PROCEDURE — 84439 ASSAY OF FREE THYROXINE: CPT | Performed by: PHYSICIAN ASSISTANT

## 2023-12-11 PROCEDURE — 99214 OFFICE O/P EST MOD 30 MIN: CPT | Performed by: PHYSICIAN ASSISTANT

## 2023-12-11 PROCEDURE — 36415 COLL VENOUS BLD VENIPUNCTURE: CPT | Performed by: PHYSICIAN ASSISTANT

## 2023-12-11 PROCEDURE — 84443 ASSAY THYROID STIM HORMONE: CPT | Performed by: PHYSICIAN ASSISTANT

## 2023-12-11 PROCEDURE — 99207 PR FOOT EXAM NO CHARGE: CPT | Performed by: PHYSICIAN ASSISTANT

## 2023-12-11 RX ORDER — INSULIN GLARGINE 100 [IU]/ML
30 INJECTION, SOLUTION SUBCUTANEOUS EVERY MORNING
Qty: 30 ML | Refills: 1 | OUTPATIENT
Start: 2023-12-11 | End: 2023-12-11 | Stop reason: ALTCHOICE

## 2023-12-11 RX ORDER — INSULIN ASPART 100 [IU]/ML
10 INJECTION, SOLUTION INTRAVENOUS; SUBCUTANEOUS 2 TIMES DAILY WITH MEALS
Qty: 15 ML | Refills: 3 | Status: SHIPPED | OUTPATIENT
Start: 2023-12-11 | End: 2024-05-16

## 2023-12-11 RX ORDER — TRETINOIN 0.25 MG/G
CREAM TOPICAL
COMMUNITY

## 2023-12-11 RX ORDER — ICOSAPENT ETHYL 1 G/1
1 CAPSULE ORAL EVERY MORNING
COMMUNITY
End: 2024-04-02

## 2023-12-11 RX ORDER — LIDOCAINE 4 G/G
1 PATCH TOPICAL PRN
COMMUNITY

## 2023-12-11 NOTE — PROGRESS NOTES
Assessment/Plan :   Type 2 DM. Sammie feels like she is doing much better. We reviewed her recent CGMS report and her time in range is up to 66%. She has no episodes of hypoglycemia and/or hyperglycemia and she is predicted to have an A1C closer to 7.2%. I encouraged her to keep up the good work. We also reviewed the difference between Basaglar and Novolog. She is not due for an A1C today but we will get one in follow-up. She is scheduled to see me in March. If she has any problems before that visit, she is to contact my office.  Hypothyroidism. We reviewed the importance of keeping her TSH level on the lower end. She has been taking 112 mcg of levothyroxine every day except for Fridays, when she takes a 1/2 tablet. Her TSH was a little elevated on her last laboratory test. We will repeat laboratory testing today and I will contact her with the results.       I have independently reviewed and interpreted labs, imaging as indicated.      Chief complaint:  Chasity is a 78 year old female who returns for follow-up of Type 2 DM and post-surgical hypothyroidism.    I have reviewed Care Everywhere including [facility], [facility], [facility], [facility] , [facility], [place] lab reports, imaging reports and provider notes as indicated.      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 She has been working to get her blood sugars under better control. She feels like her current combination of Basaglar 30 unit(s) every morning, Novolog 10 unit(s) with meals, and Jardiance 10 mg tablets, is working well. She is using the FreeStyle Vini 3 to monitor her blood sugars and she feels like her numbers are improving.    Sammie has had no recent episodes of hyperglycemia and/or hypoglycemia. She  states that she feels hot when her blood sugar starts to drop too low. She denies any problems with worsening vision or worsening numbness/tingling in her feet. However, she admits that her feet are pretty numb, as is. She does have a unhealing wound on her left shin and she is working with wound care. She was struggling with edema but that seems to be improving.     Sammie also remains stable on levothyroxine 112 mcg daily. She has history of papillary carcinoma and underwent a thyroidectomy in 2002. She had radioactive therapy after the procedure. She has not noticed any signs of thyroid regrowth. She denies any difficulty with swallowing or voice changes. She also states that she has had no trouble with fatigue or worsening anxiousness. She takes the levothyroxine every morning, as directed.     Sammie was diagnosed with diabetes in 2015. She has taken a variety of medications over the years. She tried Farxiga in the past but that led to too much urination. She has noticed that the Jardiance increases urination but it is tolerable. She was using Toujeo and then Tresiba, but they were too expensive, so she was switched to Basaglar. She has also tried and failed metformin, glipizide, Ozempic, and Januvia.    Endocrine relevant labs are as follows:   Latest Reference Range & Units 10/02/23 13:07   Albumin Urine mg/g Cr 0.00 - 25.00 mg/g Cr 433.76 (H)   (H): Data is abnormally high   Latest Reference Range & Units 10/02/23 13:07   Albumin Urine mg/L mg/L 370.0      Latest Reference Range & Units 10/02/23 13:04   Hemoglobin A1C 0.0 - 5.6 % 8.4 (H)   (H): Data is abnormally high   Latest Reference Range & Units 10/02/23 13:04   T4 Free 0.90 - 1.70 ng/dL 1.84 (H)   (H): Data is abnormally high   Latest Reference Range & Units 10/02/23 13:04   TSH 0.30 - 4.20 uIU/mL 3.44     REVIEW OF SYSTEMS    Endocrine: positive for thyroid disorder and diabetes  Skin: negative  Eyes: negative for, visual blurring, redness,  tearing  Ears/Nose/Throat: negative  Respiratory: No shortness of breath, dyspnea on exertion, cough, or hemoptysis  Cardiovascular: positive for lower extremity edema, negative for, irregular heart beat, chest pain, and dyspnea on exertion  Gastrointestinal: negative for, nausea, vomiting, constipation, and diarrhea  Genitourinary: positive for frequency, negative for, nocturia, dysuria, urgency, and hesitancy  Musculoskeletal: negative for, muscular weakness, nocturnal cramping, and foot pain  Neurologic: negative for, local weakness, numbness or tingling of hands, and numbness or tingling of feet  Psychiatric: negative  Hematologic/Lymphatic/Immunologic: negative    Past Medical History  Past Medical History:   Diagnosis Date    Abdominal adhesions 1984, 96,99    s/p lysis    Abdominal adhesions     Acne rosacea     Allergic rhinitis     Allergic rhinitis, cause unspecified     Alopecia     Anemia     CAD (coronary artery disease)     Carotid stenosis     CKD (chronic kidney disease) stage 2, GFR 60-89 ml/min     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)     CPAP (continuous positive airway pressure) dependence     Depression     Diabetic gastroparesis (H)     Diet-controlled type 2 diabetes mellitus (H)     DM2 (diabetes mellitus, type 2) (H)     DVT (deep venous thrombosis) (H)     DVT of axillary vein, acute right (H)     Fibromyalgia     Gastro-oesophageal reflux disease     GERD (gastroesophageal reflux disease)     Hernia of unspecified site of abdominal cavity without mention of obstruction or gangrene     bilateral    Hernia, abdominal     History of blood transfusion     10/ 1980    History of thrombophlebitis     HTN (hypertension)     HTN (hypertension)     Hypertriglyceridemia     Hypertriglyceridemia     Hypokalemia     Hypothyroidism     Mumps     Obstructive sleep apnea     ANNETTE (obstructive sleep apnea)     ANNETTE on CPAP     Osteoarthritis of glenohumeral joint     Papillary carcinoma of thyroid (H)     s/p  thyroidectomy - [place]    Papillary carcinoma of thyroid (H)     PE (pulmonary embolism)     Poliomyelitis     poor circulation right leg    Poliomyelitis     Postsurgical hypothyroidism     s/p papillary thryoid cancer - [place]uegemer    Pulmonary embolism (H)     Pulmonary embolus (H)     Pulmonary nodule     Rosacea     S/P carpal tunnel release     bilateral    S/P hardware removal 01/2014    still with lingering foot pain    S/P shoulder surgery     bilateral    Septic joint (H)     right knee    Septic joint of right knee joint (H)     Venous insufficiency     Venous insufficiency     Venous thrombosis 1999    right axillary vein       Medications  Current Outpatient Medications   Medication Sig Dispense Refill    acetaminophen (TYLENOL) 500 MG tablet Take 1,000 mg by mouth every 8 hours as needed      acetaminophen-caffeine (EXCEDRIN TENSION HEADACHE) 500-65 MG TABS Take 2 tablets by mouth every 6 hours as needed for mild pain 90 tablet 0    amLODIPine (NORVASC) 2.5 MG tablet Take 1 tablet (2.5 mg) by mouth daily 90 tablet 1    amoxicillin (AMOXIL) 500 MG capsule Takes 4 caps before every dental appointments.      aspirin (ASA) 81 MG EC tablet Take 81 mg by mouth daily      azelastine (ASTEPRO) 0.15 % nasal spray Spray 1 spray into both nostrils daily as needed      BIOTIN PO Take 1 capsule by mouth at bedtime Unknown dose      Calcium Carbonate-Vitamin D (CALTRATE 600+D PO) Take 1 tablet by mouth daily Takes in the afternoon      carvedilol (COREG) 12.5 MG tablet Take 1 tablet (12.5 mg) by mouth 2 times daily (with meals) 180 tablet 3    clotrimazole (LOTRIMIN) 1 % cream Apply topically as needed (rash/yeast infection)      Continuous Blood Gluc Sensor (FREESTYLE BRANDY 3 SENSOR) MISC 1 each every 14 days 6 each 3    Cyanocobalamin (B-12 PO) Take 1 tablet by mouth daily Unknown dose. Takes in the afternoon      empagliflozin (JARDIANCE) 10 MG TABS tablet Take 1 tablet (10 mg) by mouth daily 90 tablet 1     ezetimibe (ZETIA) 10 MG tablet Take 1 tablet (10 mg) by mouth daily 90 tablet 3    famotidine (PEPCID) 20 MG tablet Take 2 tablets (40 mg) by mouth daily 180 tablet 3    fenofibrate (TRICOR) 145 MG tablet Take 1 tablet (145 mg) by mouth daily 90 tablet 3    fexofenadine (ALLEGRA) 180 MG tablet Take 180 mg by mouth daily Takes in the afternoon 120 0    fluocinolone acetonide (DERMA SMOOTHE/FS BODY) 0.01 % external oil Apply 2 mL to scalp once per week. Massage into scalp. Can be left in overnight or washed out after 4-6 hours. 118.28 mL 5    fluticasone (FLONASE) 50 MCG/ACT spray Spray 2 sprays in nostril daily 2 sprays in each nostril qd 1 Bottle 0    furosemide (LASIX) 20 MG tablet Take 1 tablet (20 mg) by mouth daily as needed (lower extremity edema) 90 tablet 3    icosapent ethyl (VASCEPA) 1 g CAPS capsule Take 1 g by mouth every evening      insulin aspart (NOVOLOG FLEXPEN) 100 UNIT/ML pen Inject 10 Units Subcutaneous 2 times daily (with meals) Breakfast and supper, pt eats minimal at lunch 15 mL 3    insulin glargine (LANTUS VIAL) 100 UNIT/ML vial Inject 30 Units Subcutaneous every morning If BG<150-->takes 12 units (50% of dose) OK to substitute Basaglar. 30 mL 1    insulin pen needle (B-D U/F) 31G X 5 MM miscellaneous Use 1 pen needles daily or as directed. 90 each 3    levothyroxine (SYNTHROID/LEVOTHROID) 112 MCG tablet Take 1 tablet (112 mcg) by mouth daily 90 tablet 3    Lidocaine (LIDOCARE) 4 % Patch Place 2-3 patches onto the skin every 24 hours To prevent lidocaine toxicity, patient should be patch free for 12 hrs daily.      minoxidil (LONITEN) 2.5 MG tablet Take half tablet (1.25 mg) by mouth once daily (Patient taking differently: Take 0.625 mg by mouth daily Take 1/4 tablet (0.625 mg) by mouth once daily) 45 tablet 0    MULTIPLE VITAMIN PO Take 1 tablet by mouth daily      nitroGLYcerin (NITROSTAT) 0.4 MG sublingual tablet Place 1 tablet (0.4 mg) under the tongue every 5 minutes as needed for chest  pain (no more than 3 in one hour; after 3rd, call 911.) 25 tablet 3    nystatin (MYCOSTATIN) cream Apply topically daily as needed (groin)      nystatin-triamcinolone (MYCOLOG II) cream Apply topically daily as needed (genital itching)      olmesartan (BENICAR) 40 MG tablet Take 1 tablet (40 mg) by mouth daily 90 tablet 1    ondansetron (ZOFRAN) 4 MG tablet Take 1 tablet (4 mg) by mouth every 6 hours as needed Reported on 3/20/2017 20 tablet 0    order for DME Equipment being ordered: Compression stockings - Knee High; 20-30 mmHg compression - note would like adhesive band to keep the stocking from sliding down 3 each 0    pantoprazole (PROTONIX) 40 MG EC tablet Take 40 mg by mouth 2 times daily      Polyethylene Glycol 400 (BLINK TEARS) 0.25 % GEL Apply to eye At Bedtime      polyethylene glycol 400 (BLINK TEARS) 0.25 % SOLN ophthalmic solution Place 1 drop into both eyes daily      sucralfate (CARAFATE) 1 GM/10ML suspension Take 1 g by mouth 4 times daily as needed (GERD)      tretinoin (RETIN-A) 0.025 % external cream Apply topically nightly as needed (facial lesions) Use every night as tolerated - spot treat lesion      triamcinolone (KENALOG) 0.1 % external ointment Apply topically every other day 80 g 3    Vitamin D3 50 mcg (2000 units) tablet Take 1 tablet by mouth daily Takes in the afternoon         Allergies  Allergies   Allergen Reactions    Ketorolac Tromethamine Difficulty breathing     Shortness of breath to tablets only per the patient    Nsaids Difficulty breathing     Increased creatinine    Celecoxib Itching and Rash    Morphine And Related Itching     With higher doses. Pt denies this allergy 11/16/2023    Codeine Itching    Crestor [Rosuvastatin] Muscle Pain (Myalgia)    No Clinical Screening - See Comments Itching     Fragrance    Vioxx Other (See Comments)     Heart races    Conjugated Estrogens Rash    Sulfa Antibiotics Rash         Family History  family history includes Arthritis in her  mother, sister, and sister; Breast Cancer in her sister; Cancer in her maternal grandmother, sister, and sister; Cerebrovascular Disease in her mother; Colon Cancer in her sister; Depression in her sister; Diabetes in her brother, father, and sister; Heart Disease in her brother and mother; Hyperlipidemia in her brother; Hypertension in her brother, father, mother, sister, sister, and sister; Lipids in her brother and sister; Obesity in her maternal grandmother, mother, paternal grandmother, sister, sister, sister, and sister; Osteoporosis in her sister and sister; Other Cancer in her brother; Respiratory in her father; Skin Cancer in her maternal grandmother; Thyroid Disease in her sister and sister.    Social History  Social History     Tobacco Use    Smoking status: Never    Smokeless tobacco: Never   Vaping Use    Vaping Use: Never used   Substance Use Topics    Alcohol use: Never    Drug use: Never       Physical Exam  BP (!) 162/65 (BP Location: Left arm, Patient Position: Chair, Cuff Size: Adult Regular)   Pulse 72   Temp 97.9  F (36.6  C) (Tympanic)   Resp 18   Ht 1.524 m (5')   Wt 93 kg (205 lb)   LMP  (LMP Unknown)   SpO2 95%   Breastfeeding No   BMI 40.04 kg/m    Body mass index is 40.04 kg/m .  GENERAL :  In no apparent distress  SKIN: Normal color, normal temperature, texture.  No hirsutism, alopecia or purple striae.     EYES: PERRL, EOMI, No scleral icterus,  No proptosis, conjunctival redness, stare, retraction  NECK: No visible masses. No palpable adenopathy, or masses. No carotid bruits.   THYROID:  Thyroid gland is absent, tissue is nontender,  no nodules, no Bruit   RESP: Lungs clear to auscultation bilaterally  CARDIAC: Regular rate and rhythm, normal S1 S2, without murmurs, rubs or gallops  ABDOMEN: Normal bowel sounds; soft, nontender, no HSM or masses       NEURO: awake, alert, responds appropriately to questions.  Cranial nerves intact.   Moves all extremities; uses walker to help  with ambulation  No tremor of the outstretched hand.    EXTREMITIES: No clubbing or cyanosis. Edema is present, pt is wearing compression stockings.    DATA REVIEW  FreeStyle LibreView  Time in target range 66%  High 31% and Very High 3%  Current Ave  mg/dl

## 2023-12-11 NOTE — LETTER
12/11/2023         RE: Chasity Hodgson  [id number]  [facility] [address]        Dear Colleague,    Thank you for referring your patient, Chasity Hodgson, to the [facility]. Please see a copy of my visit note below.    Assessment/Plan :   Type 2 DM. Sammie feels like she is doing much better. We reviewed her recent CGMS report and her time in range is up to 66%. She has no episodes of hypoglycemia and/or hyperglycemia and she is predicted to have an A1C closer to 7.2%. I encouraged her to keep up the good work. We also reviewed the difference between Basaglar and Novolog. She is not due for an A1C today but we will get one in follow-up. She is scheduled to see me in March. If she has any problems before that visit, she is to contact my office.  Hypothyroidism. We reviewed the importance of keeping her TSH level on the lower end. She has been taking 112 mcg of levothyroxine every day except for Fridays, when she takes a 1/2 tablet. Her TSH was a little elevated on her last laboratory test. We will repeat laboratory testing today and I will contact her with the results.       I have independently reviewed and interpreted labs, imaging as indicated.      Chief complaint:  Chasity is a 78 year old female who returns for follow-up of Type 2 DM and post-surgical hypothyroidism.    I have reviewed Care Everywhere including [facility], [facility], [facility], [facility], [facility] , [facility], [place] lab reports, imaging reports and provider notes as indicated.      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 She has been working to get her blood sugars under better control. She feels like her current combination of Basaglar 30 unit(s) every morning, Novolog 10  unit(s) with meals, and Jardiance 10 mg tablets, is working well. She is using the FreeStyle Vini 3 to monitor her blood sugars and she feels like her numbers are improving.    Sammie has had no recent episodes of hyperglycemia and/or hypoglycemia. She states that she feels hot when her blood sugar starts to drop too low. She denies any problems with worsening vision or worsening numbness/tingling in her feet. However, she admits that her feet are pretty numb, as is. She does have a unhealing wound on her left shin and she is working with wound care. She was struggling with edema but that seems to be improving.     Sammie also remains stable on levothyroxine 112 mcg daily. She has history of papillary carcinoma and underwent a thyroidectomy in 2002. She had radioactive therapy after the procedure. She has not noticed any signs of thyroid regrowth. She denies any difficulty with swallowing or voice changes. She also states that she has had no trouble with fatigue or worsening anxiousness. She takes the levothyroxine every morning, as directed.     Sammie was diagnosed with diabetes in 2015. She has taken a variety of medications over the years. She tried Farxiga in the past but that led to too much urination. She has noticed that the Jardiance increases urination but it is tolerable. She was using Toujeo and then Tresiba, but they were too expensive, so she was switched to Basaglar. She has also tried and failed metformin, glipizide, Ozempic, and Januvia.    Endocrine relevant labs are as follows:   Latest Reference Range & Units 10/02/23 13:07   Albumin Urine mg/g Cr 0.00 - 25.00 mg/g Cr 433.76 (H)   (H): Data is abnormally high   Latest Reference Range & Units 10/02/23 13:07   Albumin Urine mg/L mg/L 370.0      Latest Reference Range & Units 10/02/23 13:04   Hemoglobin A1C 0.0 - 5.6 % 8.4 (H)   (H): Data is abnormally high   Latest Reference Range & Units 10/02/23 13:04   T4 Free 0.90 - 1.70 ng/dL 1.84 (H)   (H): Data  is abnormally high   Latest Reference Range & Units 10/02/23 13:04   TSH 0.30 - 4.20 uIU/mL 3.44     REVIEW OF SYSTEMS    Endocrine: positive for thyroid disorder and diabetes  Skin: negative  Eyes: negative for, visual blurring, redness, tearing  Ears/Nose/Throat: negative  Respiratory: No shortness of breath, dyspnea on exertion, cough, or hemoptysis  Cardiovascular: positive for lower extremity edema, negative for, irregular heart beat, chest pain, and dyspnea on exertion  Gastrointestinal: negative for, nausea, vomiting, constipation, and diarrhea  Genitourinary: positive for frequency, negative for, nocturia, dysuria, urgency, and hesitancy  Musculoskeletal: negative for, muscular weakness, nocturnal cramping, and foot pain  Neurologic: negative for, local weakness, numbness or tingling of hands, and numbness or tingling of feet  Psychiatric: negative  Hematologic/Lymphatic/Immunologic: negative    Past Medical History  Past Medical History:   Diagnosis Date     Abdominal adhesions 1984, 96,99    s/p lysis     Abdominal adhesions      Acne rosacea      Allergic rhinitis      Allergic rhinitis, cause unspecified      Alopecia      Anemia      CAD (coronary artery disease)      Carotid stenosis      CKD (chronic kidney disease) stage 2, GFR 60-89 ml/min      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)      CPAP (continuous positive airway pressure) dependence      Depression      Diabetic gastroparesis (H)      Diet-controlled type 2 diabetes mellitus (H)      DM2 (diabetes mellitus, type 2) (H)      DVT (deep venous thrombosis) (H)      DVT of axillary vein, acute right (H)      Fibromyalgia      Gastro-oesophageal reflux disease      GERD (gastroesophageal reflux disease)      Hernia of unspecified site of abdominal cavity without mention of obstruction or gangrene     bilateral     Hernia, abdominal      History of blood transfusion     10/ 1980     History of thrombophlebitis      HTN (hypertension)      HTN (hypertension)       Hypertriglyceridemia      Hypertriglyceridemia      Hypokalemia      Hypothyroidism      Mumps      Obstructive sleep apnea      ANNETTE (obstructive sleep apnea)      ANNETTE on CPAP      Osteoarthritis of glenohumeral joint      Papillary carcinoma of thyroid (H)     s/p thyroidectomy - Ruegemer     Papillary carcinoma of thyroid (H)      PE (pulmonary embolism)      Poliomyelitis     poor circulation right leg     Poliomyelitis      Postsurgical hypothyroidism     s/p papillary thryoid cancer - Ruegemer     Pulmonary embolism (H)      Pulmonary embolus (H)      Pulmonary nodule      Rosacea      S/P carpal tunnel release     bilateral     S/P hardware removal 01/2014    still with lingering foot pain     S/P shoulder surgery     bilateral     Septic joint (H)     right knee     Septic joint of right knee joint (H)      Venous insufficiency      Venous insufficiency      Venous thrombosis 1999    right axillary vein       Medications  Current Outpatient Medications   Medication Sig Dispense Refill     acetaminophen (TYLENOL) 500 MG tablet Take 1,000 mg by mouth every 8 hours as needed       acetaminophen-caffeine (EXCEDRIN TENSION HEADACHE) 500-65 MG TABS Take 2 tablets by mouth every 6 hours as needed for mild pain 90 tablet 0     amLODIPine (NORVASC) 2.5 MG tablet Take 1 tablet (2.5 mg) by mouth daily 90 tablet 1     amoxicillin (AMOXIL) 500 MG capsule Takes 4 caps before every dental appointments.       aspirin (ASA) 81 MG EC tablet Take 81 mg by mouth daily       azelastine (ASTEPRO) 0.15 % nasal spray Spray 1 spray into both nostrils daily as needed       BIOTIN PO Take 1 capsule by mouth at bedtime Unknown dose       Calcium Carbonate-Vitamin D (CALTRATE 600+D PO) Take 1 tablet by mouth daily Takes in the afternoon       carvedilol (COREG) 12.5 MG tablet Take 1 tablet (12.5 mg) by mouth 2 times daily (with meals) 180 tablet 3     clotrimazole (LOTRIMIN) 1 % cream Apply topically as needed (rash/yeast infection)        Continuous Blood Gluc Sensor (FREESTYLE BRANDY 3 SENSOR) MISC 1 each every 14 days 6 each 3     Cyanocobalamin (B-12 PO) Take 1 tablet by mouth daily Unknown dose. Takes in the afternoon       empagliflozin (JARDIANCE) 10 MG TABS tablet Take 1 tablet (10 mg) by mouth daily 90 tablet 1     ezetimibe (ZETIA) 10 MG tablet Take 1 tablet (10 mg) by mouth daily 90 tablet 3     famotidine (PEPCID) 20 MG tablet Take 2 tablets (40 mg) by mouth daily 180 tablet 3     fenofibrate (TRICOR) 145 MG tablet Take 1 tablet (145 mg) by mouth daily 90 tablet 3     fexofenadine (ALLEGRA) 180 MG tablet Take 180 mg by mouth daily Takes in the afternoon 120 0     fluocinolone acetonide (DERMA SMOOTHE/FS BODY) 0.01 % external oil Apply 2 mL to scalp once per week. Massage into scalp. Can be left in overnight or washed out after 4-6 hours. 118.28 mL 5     fluticasone (FLONASE) 50 MCG/ACT spray Spray 2 sprays in nostril daily 2 sprays in each nostril qd 1 Bottle 0     furosemide (LASIX) 20 MG tablet Take 1 tablet (20 mg) by mouth daily as needed (lower extremity edema) 90 tablet 3     icosapent ethyl (VASCEPA) 1 g CAPS capsule Take 1 g by mouth every evening       insulin aspart (NOVOLOG FLEXPEN) 100 UNIT/ML pen Inject 10 Units Subcutaneous 2 times daily (with meals) Breakfast and supper, pt eats minimal at lunch 15 mL 3     insulin glargine (LANTUS VIAL) 100 UNIT/ML vial Inject 30 Units Subcutaneous every morning If BG<150-->takes 12 units (50% of dose) OK to substitute Basaglar. 30 mL 1     insulin pen needle (B-D U/F) 31G X 5 MM miscellaneous Use 1 pen needles daily or as directed. 90 each 3     levothyroxine (SYNTHROID/LEVOTHROID) 112 MCG tablet Take 1 tablet (112 mcg) by mouth daily 90 tablet 3     Lidocaine (LIDOCARE) 4 % Patch Place 2-3 patches onto the skin every 24 hours To prevent lidocaine toxicity, patient should be patch free for 12 hrs daily.       minoxidil (LONITEN) 2.5 MG tablet Take half tablet (1.25 mg) by mouth once  daily (Patient taking differently: Take 0.625 mg by mouth daily Take 1/4 tablet (0.625 mg) by mouth once daily) 45 tablet 0     MULTIPLE VITAMIN PO Take 1 tablet by mouth daily       nitroGLYcerin (NITROSTAT) 0.4 MG sublingual tablet Place 1 tablet (0.4 mg) under the tongue every 5 minutes as needed for chest pain (no more than 3 in one hour; after 3rd, call 911.) 25 tablet 3     nystatin (MYCOSTATIN) cream Apply topically daily as needed (groin)       nystatin-triamcinolone (MYCOLOG II) cream Apply topically daily as needed (genital itching)       olmesartan (BENICAR) 40 MG tablet Take 1 tablet (40 mg) by mouth daily 90 tablet 1     ondansetron (ZOFRAN) 4 MG tablet Take 1 tablet (4 mg) by mouth every 6 hours as needed Reported on 3/20/2017 20 tablet 0     order for DME Equipment being ordered: Compression stockings - Knee High; 20-30 mmHg compression - note would like adhesive band to keep the stocking from sliding down 3 each 0     pantoprazole (PROTONIX) 40 MG EC tablet Take 40 mg by mouth 2 times daily       Polyethylene Glycol 400 (BLINK TEARS) 0.25 % GEL Apply to eye At Bedtime       polyethylene glycol 400 (BLINK TEARS) 0.25 % SOLN ophthalmic solution Place 1 drop into both eyes daily       sucralfate (CARAFATE) 1 GM/10ML suspension Take 1 g by mouth 4 times daily as needed (GERD)       tretinoin (RETIN-A) 0.025 % external cream Apply topically nightly as needed (facial lesions) Use every night as tolerated - spot treat lesion       triamcinolone (KENALOG) 0.1 % external ointment Apply topically every other day 80 g 3     Vitamin D3 50 mcg (2000 units) tablet Take 1 tablet by mouth daily Takes in the afternoon         Allergies  Allergies   Allergen Reactions     Ketorolac Tromethamine Difficulty breathing     Shortness of breath to tablets only per the patient     Nsaids Difficulty breathing     Increased creatinine     Celecoxib Itching and Rash     Morphine And Related Itching     With higher doses. Pt  denies this allergy 11/16/2023     Codeine Itching     Crestor [Rosuvastatin] Muscle Pain (Myalgia)     No Clinical Screening - See Comments Itching     Fragrance     Vioxx Other (See Comments)     Heart races     Conjugated Estrogens Rash     Sulfa Antibiotics Rash         Family History  family history includes Arthritis in her mother, sister, and sister; Breast Cancer in her sister; Cancer in her maternal grandmother, sister, and sister; Cerebrovascular Disease in her mother; Colon Cancer in her sister; Depression in her sister; Diabetes in her brother, father, and sister; Heart Disease in her brother and mother; Hyperlipidemia in her brother; Hypertension in her brother, father, mother, sister, sister, and sister; Lipids in her brother and sister; Obesity in her maternal grandmother, mother, paternal grandmother, sister, sister, sister, and sister; Osteoporosis in her sister and sister; Other Cancer in her brother; Respiratory in her father; Skin Cancer in her maternal grandmother; Thyroid Disease in her sister and sister.    Social History  Social History     Tobacco Use     Smoking status: Never     Smokeless tobacco: Never   Vaping Use     Vaping Use: Never used   Substance Use Topics     Alcohol use: Never     Drug use: Never       Physical Exam  BP (!) 162/65 (BP Location: Left arm, Patient Position: Chair, Cuff Size: Adult Regular)   Pulse 72   Temp 97.9  F (36.6  C) (Tympanic)   Resp 18   Ht 1.524 m (5')   Wt 93 kg (205 lb)   LMP  (LMP Unknown)   SpO2 95%   Breastfeeding No   BMI 40.04 kg/m    Body mass index is 40.04 kg/m .  GENERAL :  In no apparent distress  SKIN: Normal color, normal temperature, texture.  No hirsutism, alopecia or purple striae.     EYES: PERRL, EOMI, No scleral icterus,  No proptosis, conjunctival redness, stare, retraction  NECK: No visible masses. No palpable adenopathy, or masses. No carotid bruits.   THYROID:  Thyroid gland is absent, tissue is nontender,  no nodules,  no Bruit   RESP: Lungs clear to auscultation bilaterally  CARDIAC: Regular rate and rhythm, normal S1 S2, without murmurs, rubs or gallops  ABDOMEN: Normal bowel sounds; soft, nontender, no HSM or masses       NEURO: awake, alert, responds appropriately to questions.  Cranial nerves intact.   Moves all extremities; uses walker to help with ambulation  No tremor of the outstretched hand.    EXTREMITIES: No clubbing or cyanosis. Edema is present, pt is wearing compression stockings.    DATA REVIEW  FreeStyle LibreView  Time in target range 66%  High 31% and Very High 3%  Current Ave  mg/dl        Again, thank you for allowing me to participate in the care of your patient.        Sincerely,        Yolanda Tapia PA-C

## 2023-12-11 NOTE — TELEPHONE ENCOUNTER
Lantus Vials are not covered. Preferred is Basaglar (only comes in pens) or Levemir Vials. Would you like to change to preferred or PA?

## 2023-12-12 LAB
T4 FREE SERPL-MCNC: 2 NG/DL (ref 0.9–1.7)
TSH SERPL DL<=0.005 MIU/L-ACNC: 2.88 UIU/ML (ref 0.3–4.2)

## 2023-12-20 ENCOUNTER — TELEPHONE (OUTPATIENT)
Dept: FAMILY MEDICINE | Facility: CLINIC | Age: 78
End: 2023-12-20

## 2023-12-20 NOTE — TELEPHONE ENCOUNTER
Home Care is calling regarding an established patient with [facility].  {      Requesting orders from: Shola Benoit  Provider is following patient: Yes  Is this a 60-day recertification request?  No    Orders Requested    Occupational Therapy  Request for continuation of care with no increase or decrease in frequency  Frequency:  1x/wk for 3 wks   For Lymphedema therapy    Verbal approval given  George Rodríguez RN

## 2024-01-02 ENCOUNTER — TELEPHONE (OUTPATIENT)
Dept: FAMILY MEDICINE | Facility: CLINIC | Age: 79
End: 2024-01-02
Payer: MEDICARE

## 2024-01-02 DIAGNOSIS — Z53.9 DIAGNOSIS NOT YET DEFINED: Primary | ICD-10-CM

## 2024-01-02 PROCEDURE — G0180 MD CERTIFICATION HHA PATIENT: HCPCS | Performed by: INTERNAL MEDICINE

## 2024-01-02 NOTE — TELEPHONE ENCOUNTER
Tash with [facility] calling to provide update to requested orders below. Tash stated PCP needs to login to the [facility] website to sign orders and orders will not be faxed over to clinic. Tash did not have any additional questions or updates at this time.     Routing to PCP for review.

## 2024-01-02 NOTE — TELEPHONE ENCOUNTER
Home care calling to check on order requests: order #'s: [id number], [id number] (left had corner, top), [id number] (right hand corner, top) faxed to [phone].     Orders pending provider approval:     11/27/23: Lymph  therapy     12/4/23: Plan of care    12/21/23: continue lymph treatment    Provider, please review and sign if approved. Fax orders to:     Meron GUZMAN     Fax#: [phone]       [phone]

## 2024-01-04 ENCOUNTER — TELEPHONE (OUTPATIENT)
Dept: FAMILY MEDICINE | Facility: CLINIC | Age: 79
End: 2024-01-04
Payer: MEDICARE

## 2024-01-04 NOTE — TELEPHONE ENCOUNTER
Home Care is calling regarding an established patient with [facility].       Requesting orders from: Shola Benoit  Provider is following patient: Yes  Is this a 60-day recertification request?  No    Orders Requested    Skilled Nursing  Request for continuation of care with no increase or decrease in frequency  Frequency:  1x/wk for 2 wks          Verbal orders given.  Home Care will send orders for provider to sign.  Confirmed ok to leave a detailed message with call back.  Contact information confirmed and updated as needed.    Bernadette Pride RN

## 2024-01-05 ENCOUNTER — PATIENT OUTREACH (OUTPATIENT)
Dept: CARE COORDINATION | Facility: CLINIC | Age: 79
End: 2024-01-05
Payer: MEDICARE

## 2024-01-05 NOTE — PROGRESS NOTES
Clinic Care Coordination Contact  [facility]  County: [place] [facility] Case #:  [facility] Worker:   Nita #:   Subscriber #:   Renewal:  Date Applied:     FRW Outreach:   1/5/24  FRW called patient and left a vm with call back information. FRW will make outreach in 1-2 weeks  Lexie Louie   Financial Resource Worker  [facility]  Clinic Care Coordination  [phone]    12/7/23:FRW called with CTA and patient  and she informed them that she filled out the application with the [facility] and that she just needs to send in some documentation and she is going to mail it today. FRW will follow up in 30 days  Lexie Louie   [phone]      Health Insurance:      Referral/Screening:  Row Name 12/05/23 1503       [facility] Benefits   Is patient requesting help applying for [facility] benefits? Yes       Have you recently applied for any [facility] benefits? No       How many people in your household? 1       Ins[place]:   [place]? No       Is this an insurance renewal? No       Is this a new insurance application request? No       OTHER   Is this a laura care application? No       Any other information for the FRW? Patient is interested in applying for elderly waiver.

## 2024-01-06 ENCOUNTER — HEALTH MAINTENANCE LETTER (OUTPATIENT)
Age: 79
End: 2024-01-06

## 2024-01-15 ENCOUNTER — OFFICE VISIT (OUTPATIENT)
Dept: DERMATOLOGY | Facility: CLINIC | Age: 79
End: 2024-01-15
Payer: MEDICARE

## 2024-01-15 VITALS — SYSTOLIC BLOOD PRESSURE: 151 MMHG | DIASTOLIC BLOOD PRESSURE: 66 MMHG | HEART RATE: 70 BPM

## 2024-01-15 DIAGNOSIS — L64.9 ANDROGENIC ALOPECIA: ICD-10-CM

## 2024-01-15 PROCEDURE — 99214 OFFICE O/P EST MOD 30 MIN: CPT | Mod: GC | Performed by: DERMATOLOGY

## 2024-01-15 RX ORDER — MINOXIDIL 2.5 MG/1
TABLET ORAL
Qty: 90 TABLET | Refills: 2 | Status: SHIPPED | OUTPATIENT
Start: 2024-01-15 | End: 2024-08-18

## 2024-01-15 NOTE — PATIENT INSTRUCTIONS
- increase oral minoxidil to 1/2 tablet per day  - follow up with Primary about the blood pressures

## 2024-01-15 NOTE — LETTER
1/15/2024       RE: Chasity Hodgson  [id number] [facility] [address]     Dear Colleague,    Thank you for referring your patient, Chasity Hodgson, to the [facility] DERMATOLOGY CLINIC [place] at [facility]. Please see a copy of my visit note below.    [facility] Dermatology Note  Encounter Date: Regulo 15, 2024  Office Visit     Dermatology Problem List:  1. Female pattern hair loss  - Current tx: laser comb up to 3x weekly (switching to band 8/2021), free and clear shampoo, fluocinolone acetonide 0.01% oil once per week  - on oral minoxidil 1.25mg daily, hx of elevated Cr  - Baseline Hair Metrix 8/16/2021   2. Tinea corporis or candida/yeast infection under the breast  - OTC clomitrazole  3. Rosacea - stable  - Vanicream  4.  Venous stasis dermatitis - stable  - Vanicream  5. Allergic contact dermatitis - stable  - Uses fragrance-free products  - Patch testing revealed mild reaction to Balsam of [place], fragrance mix, and a strong reaction to paraphenylenediamine.   6. Xerosis cutis  -Recommend continuing use of a gentle, fragrance-free emollient based moisturizer such as Vanicream or CeraVe.   7. Abrasion , right upper arm 02/15/2021.   - Monitor, photo taken today.   8. Milia, right lateral eye  - Removed 8/16/2021   - prior tx: tretinoin  ____________________________________________    Assessment & Plan:   # Female pattern hair loss- improving  # Hx of allergic contact dermatitis of scalp  Hair loss is overall improving on oral minoxidil   - Fluocinolone 0.01% oil once per week for scalp pruritus and dryness.  - Continue to use laser headband or comb at least 3 times per week.  - Continue to use of Vanicream shampoo  - Increase oral minoxidil to 1.25mg daily, monitor labs at next visit       Procedures Performed:   None    Follow-up: 6 months    Staff and Resident      Mo Cabrales MD  PGY-4 Dermatology  Pager: [phone]    Patient was seen  and examined with the dermatology resident. I agree with the history, review of systems, physical examination, assessments and plan.    Renetta Meyer MD  Professor and  Chair  Department of Dermatology  [facility]       ____________________________________________    CC: Hair Loss (Female pattern hair loss: stable since last visit )    HPI:  Ms. Chasity Hodgson is a 78 year old female who presents as a return patient for follow-up of hair loss, diagnosed as female pattern hair loss.     - Hair loss improved  - Using oral minoxidil 1/4 tab daily, no SE  - Had hospitalization for chest pain, elevated troponin thought to be related to blood pressure control, unremarkable angiogram      Labs:  None reviewed    Physical Exam:  GEN: Well developed, well-nourished, in no acute distress, in a pleasant mood.    SKIN: Focused examination of scalp and face was performed.  - Blayne part width of 1.3/1.2 at front, 1.1  at vertex  - The layers of hair regrowth layers were noted to be  - 1-2 cm for the first  - 3-4 cm at the second  - 5-6 cm at the third, cu t layer  - no diffuse erythema   - no perifollicular erythema  - no perifollicular scale   - no scaling of the scalp   - normal eyelash density  - normal eyebrow density  - negative hair pull test  - No other lesions of concern on areas examined.       Medications:  Current Outpatient Medications   Medication     acetaminophen (TYLENOL) 500 MG tablet     acetaminophen-caffeine (EXCEDRIN TENSION HEADACHE) 500-65 MG TABS     amLODIPine (NORVASC) 2.5 MG tablet     amoxicillin (AMOXIL) 500 MG capsule     aspirin (ASA) 81 MG EC tablet     azelastine (ASTEPRO) 0.15 % nasal spray     BIOTIN PO     Calcium Carbonate-Vitamin D (CALTRATE 600+D PO)     carvedilol (COREG) 12.5 MG tablet     clotrimazole (LOTRIMIN) 1 % cream     Continuous Blood Gluc Sensor (FREESTYLE BRANDY 3 SENSOR) MISC     Cyanocobalamin (B-12 PO)     empagliflozin (JARDIANCE) 10 MG TABS tablet      ezetimibe (ZETIA) 10 MG tablet     famotidine (PEPCID) 20 MG tablet     fenofibrate (TRICOR) 145 MG tablet     fexofenadine (ALLEGRA) 180 MG tablet     fluocinolone acetonide (DERMA SMOOTHE/FS BODY) 0.01 % external oil     fluticasone (FLONASE) 50 MCG/ACT spray     furosemide (LASIX) 20 MG tablet     icosapent ethyl (VASCEPA) 1 g CAPS capsule     insulin aspart (NOVOLOG FLEXPEN) 100 UNIT/ML pen     insulin glargine (LANTUS PEN) 100 UNIT/ML pen     insulin pen needle (B-D U/F) 31G X 5 MM miscellaneous     levothyroxine (SYNTHROID/LEVOTHROID) 112 MCG tablet     Lidocaine (LIDOCARE) 4 % Patch     minoxidil (LONITEN) 2.5 MG tablet     MULTIPLE VITAMIN PO     nitroGLYcerin (NITROSTAT) 0.4 MG sublingual tablet     nystatin (MYCOSTATIN) cream     nystatin-triamcinolone (MYCOLOG II) cream     olmesartan (BENICAR) 40 MG tablet     ondansetron (ZOFRAN) 4 MG tablet     order for DME     pantoprazole (PROTONIX) 40 MG EC tablet     Polyethylene Glycol 400 (BLINK TEARS) 0.25 % GEL     polyethylene glycol 400 (BLINK TEARS) 0.25 % SOLN ophthalmic solution     sucralfate (CARAFATE) 1 GM/10ML suspension     tretinoin (RETIN-A) 0.025 % external cream     triamcinolone (KENALOG) 0.1 % external ointment     Vitamin D3 50 mcg (2000 units) tablet     No current facility-administered medications for this visit.      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)     Anemia due to blood loss, acute     ACP (advance care planning)     Postsurgical hypothyroidism     Type 2 diabetes, HbA1c goal < 7% (H)     Gastroparesis     Papillary carcinoma of thyroid (H)     Alopecia     Pulmonary nodule     Esophageal reflux     Dermatitis     Acne rosacea     Diabetic  gastroparesis (H)     Poliomyelitis     Left knee pain     Carotid stenosis     Right shoulder pain     Type 2 diabetes mellitus with other specified complication (H)     Insufficiency, arterial, peripheral (H24)     Allergic dermatitis due to other chemical product     Normocytic anemia     Morbid obesity with BMI of 40.0-44.9, adult (H)     Mild major depression (H)     CKD (chronic kidney disease) stage 3, GFR 30-59 ml/min (H)     Renal artery stenosis (H24)     Neuropathic pain     Mild major depression (H24)     Venous insufficiency     Avascular necrosis of bone of hip, left (H)     Contact dermatitis     Diabetic nephropathy associated with type 2 diabetes mellitus (H)     DVT of upper extremity (deep vein thrombosis) (H)     Dysphagia     Edema of lower extremity     History of colonic polyps     History of pulmonary embolus (PE)     Iron deficiency anemia     Inguinal pain     Moderate protein-calorie malnutrition (H24)     Osteoarthritis of left hip     Primary insomnia     Right lower quadrant pain     Status post total shoulder arthroplasty, left     Surgical aftercare, musculoskeletal system     Vitamin D deficiency     Lymphedema     Atypical chest pain     Episodic tension-type headache, not intractable     Blurred vision     Pain of cheek     Abdominal adhesions     Mixed incontinence     OAB (overactive bladder)     Cellulitis of lower extremity, unspecified laterality     Cellulitis     Acute chest pain     Venous stasis ulcer of left lower leg with edema of left lower leg (H)     Past Medical History:   Diagnosis Date     Abdominal adhesions 1984, 96,99    s/p lysis     Abdominal adhesions      Acne rosacea      Allergic rhinitis      Allergic rhinitis, cause unspecified      Alopecia      Anemia      CAD (coronary artery disease)      Carotid stenosis      CKD (chronic kidney disease) stage 2, GFR 60-89 ml/min      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)      CPAP (continuous positive airway pressure) dependence       Depression      Diabetic gastroparesis (H)      Diet-controlled type 2 diabetes mellitus (H)      DM2 (diabetes mellitus, type 2) (H)      DVT (deep venous thrombosis) (H)      DVT of axillary vein, acute right (H)      Fibromyalgia      Gastro-oesophageal reflux disease      GERD (gastroesophageal reflux disease)      Hernia of unspecified site of abdominal cavity without mention of obstruction or gangrene     bilateral     Hernia, abdominal      History of blood transfusion     10/ 1980     History of thrombophlebitis      HTN (hypertension)      HTN (hypertension)      Hypertriglyceridemia      Hypertriglyceridemia      Hypokalemia      Hypothyroidism      Mumps      Obstructive sleep apnea      ANNETTE (obstructive sleep apnea)      ANNETTE on CPAP      Osteoarthritis of glenohumeral joint      Papillary carcinoma of thyroid (H)     s/p thyroidectomy - Ruegemer     Papillary carcinoma of thyroid (H)      PE (pulmonary embolism)      Poliomyelitis     poor circulation right leg     Poliomyelitis      Postsurgical hypothyroidism     s/p papillary thryoid cancer - Ruegemer     Pulmonary embolism (H)      Pulmonary embolus (H)      Pulmonary nodule      Rosacea      S/P carpal tunnel release     bilateral     S/P hardware removal 01/2014    still with lingering foot pain     S/P shoulder surgery     bilateral     Septic joint (H)     right knee     Septic joint of right knee joint (H)      Venous insufficiency      Venous insufficiency      Venous thrombosis 1999    right axillary vein       CC No referring provider defined for this encounter. on close of this encounter.      Again, thank you for allowing me to participate in the care of your patient.      Sincerely,    Renetta Meyer MD

## 2024-01-15 NOTE — PROGRESS NOTES
[facility] Dermatology Note  Encounter Date: Regulo 15, 2024  Office Visit     Dermatology Problem List:  1. Female pattern hair loss  - Current tx: laser comb up to 3x weekly (switching to band 8/2021), free and clear shampoo, fluocinolone acetonide 0.01% oil once per week  - on oral minoxidil 1.25mg daily, hx of elevated Cr  - Baseline Hair Metrix 8/16/2021   2. Tinea corporis or candida/yeast infection under the breast  - OTC clomitrazole  3. Rosacea - stable  - Vanicream  4.  Venous stasis dermatitis - stable  - Vanicream  5. Allergic contact dermatitis - stable  - Uses fragrance-free products  - Patch testing revealed mild reaction to [place]alsam of [place], fragrance mix, and a strong reaction to paraphenylenediamine.   6. Xerosis cutis  -Recommend continuing use of a gentle, fragrance-free emollient based moisturizer such as Vanicream or CeraVe.   7. Abrasion , right upper arm 02/15/2021.   - Monitor, photo taken today.   8. Milia, right lateral eye  - Removed 8/16/2021   - prior tx: tretinoin  ____________________________________________    Assessment & Plan:   # Female pattern hair loss- improving  # Hx of allergic contact dermatitis of scalp  Hair loss is overall improving on oral minoxidil   - Fluocinolone 0.01% oil once per week for scalp pruritus and dryness.  - Continue to use laser headband or comb at least 3 times per week.  - Continue to use of Vanicream shampoo  - Increase oral minoxidil to 1.25mg daily, monitor labs at next visit       Procedures Performed:   None    Follow-up: 6 months    Staff and Resident      Mo Cabrales MD  PGY-4 Dermatology  Pager: [phone]    Patient was seen and examined with the dermatology resident. I agree with the history, review of systems, physical examination, assessments and plan.    Renetta Meyer MD  Professor and  Chair  Department of Dermatology  [facility]       ____________________________________________    CC: Hair Loss (Female  pattern hair loss: stable since last visit )    HPI:  Ms. Chasity Hodgson is a 78 year old female who presents as a return patient for follow-up of hair loss, diagnosed as female pattern hair loss.     - Hair loss improved  - Using oral minoxidil 1/4 tab daily, no SE  - Had hospitalization for chest pain, elevated troponin thought to be related to blood pressure control, unremarkable angiogram      Labs:  None reviewed    Physical Exam:  GEN: Well developed, well-nourished, in no acute distress, in a pleasant mood.    SKIN: Focused examination of scalp and face was performed.  - Blayne part width of 1.3/1.2 at front, 1.1  at vertex  - The layers of hair regrowth layers were noted to be  - 1-2 cm for the first  - 3-4 cm at the second  - 5-6 cm at the third, cu t layer  - no diffuse erythema   - no perifollicular erythema  - no perifollicular scale   - no scaling of the scalp   - normal eyelash density  - normal eyebrow density  - negative hair pull test  - No other lesions of concern on areas examined.       Medications:  Current Outpatient Medications   Medication    acetaminophen (TYLENOL) 500 MG tablet    acetaminophen-caffeine (EXCEDRIN TENSION HEADACHE) 500-65 MG TABS    amLODIPine (NORVASC) 2.5 MG tablet    amoxicillin (AMOXIL) 500 MG capsule    aspirin (ASA) 81 MG EC tablet    azelastine (ASTEPRO) 0.15 % nasal spray    BIOTIN PO    Calcium Carbonate-Vitamin D (CALTRATE 600+D PO)    carvedilol (COREG) 12.5 MG tablet    clotrimazole (LOTRIMIN) 1 % cream    Continuous Blood Gluc Sensor (FREESTYLE BRANDY 3 SENSOR) MISC    Cyanocobalamin (B-12 PO)    empagliflozin (JARDIANCE) 10 MG TABS tablet    ezetimibe (ZETIA) 10 MG tablet    famotidine (PEPCID) 20 MG tablet    fenofibrate (TRICOR) 145 MG tablet    fexofenadine (ALLEGRA) 180 MG tablet    fluocinolone acetonide (DERMA SMOOTHE/FS BODY) 0.01 % external oil    fluticasone (FLONASE) 50 MCG/ACT spray    furosemide (LASIX) 20 MG tablet    icosapent ethyl (VASCEPA) 1 g  CAPS capsule    insulin aspart (NOVOLOG FLEXPEN) 100 UNIT/ML pen    insulin glargine (LANTUS PEN) 100 UNIT/ML pen    insulin pen needle (B-D U/F) 31G X 5 MM miscellaneous    levothyroxine (SYNTHROID/LEVOTHROID) 112 MCG tablet    Lidocaine (LIDOCARE) 4 % Patch    minoxidil (LONITEN) 2.5 MG tablet    MULTIPLE VITAMIN PO    nitroGLYcerin (NITROSTAT) 0.4 MG sublingual tablet    nystatin (MYCOSTATIN) cream    nystatin-triamcinolone (MYCOLOG II) cream    olmesartan (BENICAR) 40 MG tablet    ondansetron (ZOFRAN) 4 MG tablet    order for DME    pantoprazole (PROTONIX) 40 MG EC tablet    Polyethylene Glycol 400 (BLINK TEARS) 0.25 % GEL    polyethylene glycol 400 (BLINK TEARS) 0.25 % SOLN ophthalmic solution    sucralfate (CARAFATE) 1 GM/10ML suspension    tretinoin (RETIN-A) 0.025 % external cream    triamcinolone (KENALOG) 0.1 % external ointment    Vitamin D3 50 mcg (2000 units) tablet     No current facility-administered medications for this visit.      Past Medical History:   Patient Active Problem List   Diagnosis    Low back pain    Mixed hyperlipidemia    Benign essential hypertension    Fibromyalgia    Allergic rhinitis    ANNETTE (obstructive sleep apnea)    Cavovarus deformity of foot    History of DVT (deep vein thrombosis)    Hypokalemia    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)    Anemia due to blood loss, acute    ACP (advance care planning)    Postsurgical hypothyroidism    Type 2 diabetes, HbA1c goal < 7% (H)    Gastroparesis    Papillary carcinoma of thyroid (H)    Alopecia    Pulmonary nodule    Esophageal reflux    Dermatitis    Acne rosacea    Diabetic gastroparesis (H)    Poliomyelitis    Left knee pain    Carotid stenosis    Right shoulder pain    Type 2 diabetes mellitus with other specified complication (H)    Insufficiency, arterial, peripheral (H24)    Allergic dermatitis due to other chemical product    Normocytic anemia    Morbid obesity with BMI of 40.0-44.9, adult (H)    Mild major depression (H)    CKD (chronic  kidney disease) stage 3, GFR 30-59 ml/min (H)    Renal artery stenosis (H24)    Neuropathic pain    Mild major depression (H24)    Venous insufficiency    Avascular necrosis of bone of hip, left (H)    Contact dermatitis    Diabetic nephropathy associated with type 2 diabetes mellitus (H)    DVT of upper extremity (deep vein thrombosis) (H)    Dysphagia    Edema of lower extremity    History of colonic polyps    History of pulmonary embolus (PE)    Iron deficiency anemia    Inguinal pain    Moderate protein-calorie malnutrition (H24)    Osteoarthritis of left hip    Primary insomnia    Right lower quadrant pain    Status post total shoulder arthroplasty, left    Surgical aftercare, musculoskeletal system    Vitamin D deficiency    Lymphedema    Atypical chest pain    Episodic tension-type headache, not intractable    Blurred vision    Pain of cheek    Abdominal adhesions    Mixed incontinence    OAB (overactive bladder)    Cellulitis of lower extremity, unspecified laterality    Cellulitis    Acute chest pain    Venous stasis ulcer of left lower leg with edema of left lower leg (H)     Past Medical History:   Diagnosis Date    Abdominal adhesions 1984, 96,99    s/p lysis    Abdominal adhesions     Acne rosacea     Allergic rhinitis     Allergic rhinitis, cause unspecified     Alopecia     Anemia     CAD (coronary artery disease)     Carotid stenosis     CKD (chronic kidney disease) stage 2, GFR 60-89 ml/min     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)     CPAP (continuous positive airway pressure) dependence     Depression     Diabetic gastroparesis (H)     Diet-controlled type 2 diabetes mellitus (H)     DM2 (diabetes mellitus, type 2) (H)     DVT (deep venous thrombosis) (H)     DVT of axillary vein, acute right (H)     Fibromyalgia     Gastro-oesophageal reflux disease     GERD (gastroesophageal reflux disease)     Hernia of unspecified site of abdominal cavity without mention of obstruction or gangrene     bilateral    Hernia,  abdominal     History of blood transfusion     10/ 1980    History of thrombophlebitis     HTN (hypertension)     HTN (hypertension)     Hypertriglyceridemia     Hypertriglyceridemia     Hypokalemia     Hypothyroidism     Mumps     Obstructive sleep apnea     ANNETTE (obstructive sleep apnea)     ANNETTE on CPAP     Osteoarthritis of glenohumeral joint     Papillary carcinoma of thyroid (H)     s/p thyroidectomy - Ruegemer    Papillary carcinoma of thyroid (H)     PE (pulmonary embolism)     Poliomyelitis     poor circulation right leg    Poliomyelitis     Postsurgical hypothyroidism     s/p papillary thryoid cancer - Ruegemer    Pulmonary embolism (H)     Pulmonary embolus (H)     Pulmonary nodule     Rosacea     S/P carpal tunnel release     bilateral    S/P hardware removal 01/2014    still with lingering foot pain    S/P shoulder surgery     bilateral    Septic joint (H)     right knee    Septic joint of right knee joint (H)     Venous insufficiency     Venous insufficiency     Venous thrombosis 1999    right axillary vein       CC No referring provider defined for this encounter. on close of this encounter.

## 2024-01-15 NOTE — NURSING NOTE
Dermatology Rooming Note    Chasity Hodgson's goals for this visit include:   Chief Complaint   Patient presents with    Hair Loss     Female pattern hair loss: stable since last visit      Jennifer Leos LPN

## 2024-01-16 ENCOUNTER — VIRTUAL VISIT (OUTPATIENT)
Dept: UROLOGY | Facility: CLINIC | Age: 79
End: 2024-01-16
Payer: MEDICARE

## 2024-01-16 ENCOUNTER — PATIENT OUTREACH (OUTPATIENT)
Dept: CARE COORDINATION | Facility: CLINIC | Age: 79
End: 2024-01-16

## 2024-01-16 VITALS — BODY MASS INDEX: 39.65 KG/M2 | WEIGHT: 203 LBS

## 2024-01-16 DIAGNOSIS — N39.46 MIXED INCONTINENCE: ICD-10-CM

## 2024-01-16 DIAGNOSIS — N32.81 OAB (OVERACTIVE BLADDER): Primary | ICD-10-CM

## 2024-01-16 PROCEDURE — 99212 OFFICE O/P EST SF 10 MIN: CPT | Mod: 95 | Performed by: PHYSICIAN ASSISTANT

## 2024-01-16 ASSESSMENT — PAIN SCALES - GENERAL: PAINLEVEL: MODERATE PAIN (5)

## 2024-01-16 NOTE — PROGRESS NOTES
"Clinic Care Coordination Contact  Community Health Worker Follow Up    Care Gaps:     Health Maintenance Due   Topic Date Due    URINE DRUG SCREEN  Never done    RSV VACCINE (Pregnancy & 60+) (1 - 1-dose 60+ series) Never done    ZOSTER IMMUNIZATION (2 of 3) 10/24/2016    MEDICARE ANNUAL WELLNESS VISIT  10/12/2023       Care Gaps Last addressed on 1/16/24    Care Plan:   Care Plan: Community Resources       Problem: Insufficient In-home support       Note:     Goal Statement: Sammie will continue working with Care Coordination to ensure her needs are met for overall health and wellbeing.  Date Goal Set: 12/5/23  Barriers: Limited finances  Strengths: Strong support system  Date to Achieve By: 12/5/24  Patient expressed understanding of goal: yes  Action steps to achieve this goal:  1. I will continue to follow up with medical team as needed  2. I will work with FRW on discussing and applying for E/W program  4. I will consider options for chore services in my home from CCSW and CHW  5. I will outreach to Care Coordination  for further questions or concerns          Goal: Establish adequate home support       This Visit's Progress: 10%                        Discussed:  Patient stated she had a \"horrible\" experience with [employer] today.  Patient's ride was scheduled to pick her up at 9:31am.  Patient stated she had a 11:00am medical appointment.  Patient called [employer] at 10:14am and was told that the ride was on the way.  Patient called the clinic to let them know she was not going to make the 11:00am appointment.  The person at the clinic scheduled the appt to a virtual visit.  Patient was happy that she was able to have her Urology appointment.  Patient stated she gave up her car seven months ago.  Patient relies on [employer].   Patient stated she ordered groceries on 1/14/24 from [employer].  Patient stated her groceries have always been delivered to her inside her home.  Patient " stated she received a text message her groceries were delivered.  Patient stated the groceries were sitting in the middle of her garage and she had to carry the bags.  Patient stated she called [employer].  Patient stated later that day she celebrated her son's 50th birthday at her house.    [facility]  Patient stated her  is at the top of the list for placement.  Patient stated she took a tour of the facility.  Patient stated the building is beautiful.  Patient stated everyone has a private room and bath.  Patient [facility] in [facility].  Patient's  is currently at [facility].  Patient stated she called [facility] for herself regarding waivers.  Patient is in the process of completing the paperwork.  Patient stated she completed the paperwork for her 's waiver.  Patient stated she paid $40.00 for the package to be mailed overnight due to time sensitive.  Patient called the [facility] to confirm paperwork was received.    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  Patient stated she has put that on hold.  Patient really like making these for babies.  Patient thanked W for calling.  Patient stated she appreciates the calls.      Intervention and Education during outreach:     CHW encouraged patient to contact CC if there are any other changes or resources needed.  Patient acknowledged understanding.     CHW Plan: will outreach in one month.    HERMANN Hwang  Clinic Care Coordination  [facility] [place] [place], [place], and [place] [facility]  Phone: [phone]

## 2024-01-16 NOTE — PROGRESS NOTES
Video-Visit Details    Type of service:  Video Visit    Video Start Time:  11:04 AM    Video End Time:11:20 AM    Originating Location (pt. Location): Home in [place]    Distant Location (provider location): onsite    Platform used for Video Visit: [facility]    Urology Clinic    Name: Chasity Hodgson    MRN: [id number]   YOB: 1945  Accompanied at today's visit by:self              Assessment and Plan:   78 year old female with OAB and neurogenic bladder s/p botox on 100 units on 9/18/23.    - follow-up for nurse visit for PVR check.  - Will discuss repeating botox with Dr. Zendejas vs increasing dose of botox given her symptoms returning prior to 6 months.   - After discussing the assessment and plan with patient, patient verbalized understanding and agreed to the above plan. All questions answered.     16 minutes were spent today on the date of the encounter in reviewing the EMR, direct patient care, coordination of care and documentation in addition to reviewing labs.     Lupe Mcrae PA-C  January 16, 2024    Patient Care Team:  Shola Benoit MD as PCP - General (Internal Medicine)  Shola Benoit MD as Assigned PCP  Renetta Meyer MD as MD (Dermatology)  ROMI Roque MD as MD (INTERNAL MEDICINE - ENDOCRINOLOGY, DIABETES & METABOLISM)  Ulises Pantoja MD as MD (Gastroenterology)  Kishore Ferrera MD as MD (Orthopedics)  Tara Cornejo, RN as Diabetes Educator (Diabetes Education)  Catrachita Prather, PharmD as Pharmacist (Pharmacist)  Odette Weston MD as MD (Endocrinology, Diabetes, and Metabolism)  Odette Weston MD as Assigned Endocrinology Provider  Goltz, Bennett Ezra, PA-C as Assigned Neuroscience Provider  Terra Bridges PA-C as Physician Assistant (Urology)  Amber Chan APRN CNP as Assigned Heart and Vascular Provider  Marbella Martinez PA-C as Physician Assistant (Urology)  Yolanda Tapia PA-C as Physician Assistant (Endocrinology,  "Diabetes, and Metabolism)  Kaelyn Hennessy [facility] as Lead Care Coordinator  Renetta Meyer MD as Assigned Surgical Provider  Nikia Wan CHW as Community Health Worker (Primary Care - CC)  Lexie Louie MA as Financial Resource Worker  Lupe Mcrae PA-C as Physician Assistant  SELF, REFERRED          Chief Complaint:   OAB          History of Present Illness:   January 16, 2024    HISTORY: Chasity Hodgson is a 78 year old female is here for follow-up. We have been following her for OAB and neurogenic bladder. UDS on 7/6/23 showed DO/DOI. Has failed OAB medications. S/p cysto with botox 100 units on 9/18/23 and was pleased with botox. Here for followup. No recent UTIs in remote past. States the botox was \"fantastic the first 2 months\". However started having large episodes of leakage without warning. Also has leakage when running water from the sink will have strong urge and at times UUI. However, her symptoms are still not as bad as what it was prior to her botox procedure. Denies s/s of UTI. Feels like she empties her bladder completely most of the time. Of note, last encounter reported numbness of her face and advised to go to ER. Patient states her symptoms were secondary to a new tooth paste she was trying. Has not had issues since she discontinued the tooth paste. Patient voices no other concerns at this time.            Past Medical History:     Past Medical History:   Diagnosis Date    Abdominal adhesions 1984, 96,99    s/p lysis    Abdominal adhesions     Acne rosacea     Allergic rhinitis     Allergic rhinitis, cause unspecified     Alopecia     Anemia     CAD (coronary artery disease)     Carotid stenosis     CKD (chronic kidney disease) stage 2, GFR 60-89 ml/min     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)     CPAP (continuous positive airway pressure) dependence     Depression     Diabetic gastroparesis (H)     Diet-controlled type 2 diabetes mellitus (H)     DM2 (diabetes mellitus, type 2) " (H)     DVT (deep venous thrombosis) (H)     DVT of axillary vein, acute right (H)     Fibromyalgia     Gastro-oesophageal reflux disease     GERD (gastroesophageal reflux disease)     Hernia of unspecified site of abdominal cavity without mention of obstruction or gangrene     bilateral    Hernia, abdominal     History of blood transfusion     10/ 1980    History of thrombophlebitis     HTN (hypertension)     HTN (hypertension)     Hypertriglyceridemia     Hypertriglyceridemia     Hypokalemia     Hypothyroidism     Mumps     Obstructive sleep apnea     ANNETTE (obstructive sleep apnea)     ANNETTE on CPAP     Osteoarthritis of glenohumeral joint     Papillary carcinoma of thyroid (H)     s/p thyroidectomy - Ruegemer    Papillary carcinoma of thyroid (H)     PE (pulmonary embolism)     Poliomyelitis     poor circulation right leg    Poliomyelitis     Postsurgical hypothyroidism     s/p papillary thryoid cancer - Ruegemer    Pulmonary embolism (H)     Pulmonary embolus (H)     Pulmonary nodule     Rosacea     S/P carpal tunnel release     bilateral    S/P hardware removal 01/2014    still with lingering foot pain    S/P shoulder surgery     bilateral    Septic joint (H)     right knee    Septic joint of right knee joint (H)     Venous insufficiency     Venous insufficiency     Venous thrombosis 1999    right axillary vein          Past Surgical History:     Past Surgical History:   Procedure Laterality Date    AMPUTATE TOE(S)  03/15/2012    Procedure:AMPUTATE TOE(S); Surgeon:RUBEN MOSES; Location: OR    AMPUTATE TOE(S)      APPENDECTOMY  1972    APPENDECTOMY      ARTHRODESIS FOOT  03/15/2012    Procedure:ARTHRODESIS FOOT; RIGHT TRIPLE ARTHRODESIS, FIFTH TOE AMPUTATION, LATERAL LIGAMENT RECONSTRUCTION, TENDON TRANSFER AND RELEASE [MINI C-ARM, ARTHREX 4.5 AND 6.7 STAPLES, BIOCOMPOSITE TENODESIS SCREWS]; Surgeon:RUBEN MOSES; Location: OR    ARTHRODESIS FOOT Right     Right foot triple arthrodesis  and removal of hardware    ARTHROSCOPY SHOULDER  06/25/2015    REVISION SUBACROMIAL DECOMPRESSION, EXCISION OF GANGLION CYST, DEBRIDEMENT AND EXCISION OF THE GLENOHUMERAL JOINT GANGLION CYST, CORACOID DECOMPRESSION, POSSIBLE SUBSCAPULARIS REPAIR AND OPEN SUBSCAPULARIS BICEP    ARTHROSCOPY SHOULDER ROTATOR CUFF REPAIR      BIOPSY  Thyroid 2002    BLADDER SURGERY      BOTOX INJECTION MEDICAL      BREAST SURGERY  Biopsy    CHOLECYSTECTOMY      CHOLECYSTECTOMY      COLONOSCOPY  2018    COLOSTOMY  02/07/2012    Procedure:COLOSTOMY; CREATION OF SIGMOID COLOSTOMY AND EXTENSIVE  LYSIS OF ADHESIONS; Surgeon:MONTSERRAT BENDER; Location: OR    COLOSTOMY      CV ANGIOGRAM RENAL BILATERAL Bilateral 11/17/2023    Procedure: Angiogram Renal Bilateral;  Surgeon: Ankit Bhatia MD;  Location:  [facility]    CV CORONARY ANGIOGRAM N/A 11/17/2023    Procedure: Coronary Angiogram;  Surgeon: Ankit Bhatia MD;  Location:  HE[facility] LAB    CYSTOSCOPY      CYSTOSCOPY, INJECT BOTOX N/A 09/18/2023    Procedure: CYSTOSCOPY, WITH BOTULINUM TOXIN INJECTION;  Surgeon: Misehl Zendejas MD;  Location: [facility] OR    EYE SURGERY      GENITOURINARY SURGERY  1999    GI SURGERY      weakened rectal sphincter with artificial stimulator    HERNIA REPAIR  1976    HYSTERECTOMY TOTAL ABDOMINAL      LAPAROTOMY, LYSIS ADHESIONS, COMBINED  02/07/2012    Procedure:COMBINED LAPAROTOMY, LYSIS ADHESIONS; Surgeon:MONTSERRAT BENDER; Location: OR    RELEASE CARPAL TUNNEL      RELEASE TENDON FOOT  03/15/2012    Procedure:RELEASE TENDON FOOT; Surgeon:RUBEN MOSES; Location: OR    REMOVE HARDWARE FOOT  12/13/2012    Procedure: REMOVE HARDWARE FOOT;  RIGHT FOOT REMOVAL OF HARDWARE;  Surgeon: Ruben Moses MD;  Location:  OR    SHOULDER SURGERY  11/12/2020    LEFT SHOULDER HEMIARHTROPLASTY, BICEP TENODESIS    SOFT TISSUE SURGERY  2018, 2020    TENDON RELEASE      foot    THYROIDECTOMY      [facility]  FREEING BOWEL ADHESION,ENTEROLYSIS      1986, 1996, 1999    ZZC NONSPECIFIC PROCEDURE      throidectomy    ZZC TOTAL ABDOM HYSTERECTOMY  1980    + BSO            Social History:     Social History     Tobacco Use    Smoking status: Never    Smokeless tobacco: Never   Substance Use Topics    Alcohol use: Never            Family History:     Family History   Problem Relation Age of Onset    Arthritis Mother     Hypertension Mother     Cerebrovascular Disease Mother     Obesity Mother     Heart Disease Mother         MI's    Hypertension Father     Respiratory Father         Adult RDS    Diabetes Father     Arthritis Sister     Cancer Sister     Diabetes Sister     Hypertension Sister     Breast Cancer Sister     Depression Sister     Thyroid Disease Sister     Obesity Sister     Arthritis Sister     Hypertension Sister     Thyroid Disease Sister     Osteoporosis Sister     Obesity Sister     Cancer Sister         colon polup    Hypertension Sister     Osteoporosis Sister     Obesity Sister     Colon Cancer Sister     Lipids Sister     Obesity Sister     Obesity Maternal Grandmother         Dad s mother    Skin Cancer Maternal Grandmother         skin cancer unknown    Cancer Maternal Grandmother         unknown skin cancer on face    Obesity Paternal Grandmother         Mothers mother    Heart Disease Brother         MI at 54    Other Cancer Brother         Lung & bone    Lipids Brother     Hypertension Brother     Diabetes Brother     Hyperlipidemia Brother     Ovarian Cancer No family hx of           Allergies:     Allergies   Allergen Reactions    Ketorolac Tromethamine Difficulty breathing     Shortness of breath to tablets only per the patient    Nsaids Difficulty breathing     Increased creatinine    Celecoxib Itching and Rash    Morphine And Related Itching     With higher doses. Pt denies this allergy 11/16/2023    Codeine Itching    Crestor [Rosuvastatin] Muscle Pain (Myalgia)    No Clinical Screening - See  Comments Itching     Fragrance    Vioxx Other (See Comments)     Heart races    Conjugated Estrogens Rash    Sulfa Antibiotics Rash          Medications:     Current Outpatient Medications   Medication Sig    acetaminophen (TYLENOL) 500 MG tablet Take 1,000 mg by mouth every 8 hours as needed    acetaminophen-caffeine (EXCEDRIN TENSION HEADACHE) 500-65 MG TABS Take 2 tablets by mouth every 6 hours as needed for mild pain    amLODIPine (NORVASC) 2.5 MG tablet Take 1 tablet (2.5 mg) by mouth daily    amoxicillin (AMOXIL) 500 MG capsule Takes 4 caps before every dental appointments.    aspirin (ASA) 81 MG EC tablet Take 81 mg by mouth daily    azelastine (ASTEPRO) 0.15 % nasal spray Spray 1 spray into both nostrils daily as needed    BIOTIN PO Take 1 capsule by mouth at bedtime Unknown dose    Calcium Carbonate-Vitamin D (CALTRATE 600+D PO) Take 1 tablet by mouth daily Takes in the afternoon    carvedilol (COREG) 12.5 MG tablet Take 1 tablet (12.5 mg) by mouth 2 times daily (with meals)    clotrimazole (LOTRIMIN) 1 % cream Apply topically as needed (rash/yeast infection)    Continuous Blood Gluc Sensor (FREESTYLE BRANDY 3 SENSOR) MISC 1 each every 14 days    Cyanocobalamin (B-12 PO) Take 1 tablet by mouth daily Unknown dose. Takes in the afternoon    empagliflozin (JARDIANCE) 10 MG TABS tablet Take 1 tablet (10 mg) by mouth daily    ezetimibe (ZETIA) 10 MG tablet Take 1 tablet (10 mg) by mouth daily    famotidine (PEPCID) 20 MG tablet Take 2 tablets (40 mg) by mouth daily    fenofibrate (TRICOR) 145 MG tablet Take 1 tablet (145 mg) by mouth daily    fexofenadine (ALLEGRA) 180 MG tablet Take 180 mg by mouth daily Takes in the afternoon    fluocinolone acetonide (DERMA SMOOTHE/FS BODY) 0.01 % external oil Apply 2 mL to scalp once per week. Massage into scalp. Can be left in overnight or washed out after 4-6 hours.    fluticasone (FLONASE) 50 MCG/ACT spray Spray 2 sprays in nostril daily 2 sprays in each nostril qd     furosemide (LASIX) 20 MG tablet Take 1 tablet (20 mg) by mouth daily as needed (lower extremity edema)    icosapent ethyl (VASCEPA) 1 g CAPS capsule Take 1 g by mouth every evening    insulin aspart (NOVOLOG FLEXPEN) 100 UNIT/ML pen Inject 10 Units Subcutaneous 2 times daily (with meals) Breakfast and supper, pt eats minimal at lunch    insulin glargine (LANTUS PEN) 100 UNIT/ML pen Inject 30 Units Subcutaneous every morning If BG<100, take 50% of usual dose (15 unit(s) )    insulin pen needle (B-D U/F) 31G X 5 MM miscellaneous Use 1 pen needles daily or as directed.    levothyroxine (SYNTHROID/LEVOTHROID) 112 MCG tablet Take 1 tablet (112 mcg) by mouth daily    Lidocaine (LIDOCARE) 4 % Patch Place 2-3 patches onto the skin every 24 hours To prevent lidocaine toxicity, patient should be patch free for 12 hrs daily.    minoxidil (LONITEN) 2.5 MG tablet Take half tablet (1.25 mg) by mouth once daily    MULTIPLE VITAMIN PO Take 1 tablet by mouth daily    nitroGLYcerin (NITROSTAT) 0.4 MG sublingual tablet Place 1 tablet (0.4 mg) under the tongue every 5 minutes as needed for chest pain (no more than 3 in one hour; after 3rd, call 911.)    nystatin (MYCOSTATIN) cream Apply topically daily as needed (groin)    nystatin-triamcinolone (MYCOLOG II) cream Apply topically daily as needed (genital itching)    olmesartan (BENICAR) 40 MG tablet Take 1 tablet (40 mg) by mouth daily    ondansetron (ZOFRAN) 4 MG tablet Take 1 tablet (4 mg) by mouth every 6 hours as needed Reported on 3/20/2017    order for DME Equipment being ordered: Compression stockings - Knee High; 20-30 mmHg compression - note would like adhesive band to keep the stocking from sliding down    pantoprazole (PROTONIX) 40 MG EC tablet Take 40 mg by mouth 2 times daily    Polyethylene Glycol 400 (BLINK TEARS) 0.25 % GEL Apply to eye At Bedtime    polyethylene glycol 400 (BLINK TEARS) 0.25 % SOLN ophthalmic solution Place 1 drop into both eyes daily    sucralfate  (CARAFATE) 1 GM/10ML suspension Take 1 g by mouth 4 times daily as needed (GERD)    tretinoin (RETIN-A) 0.025 % external cream Apply topically nightly as needed (facial lesions) Use every night as tolerated - spot treat lesion    triamcinolone (KENALOG) 0.1 % external ointment Apply topically every other day    Vitamin D3 50 mcg (2000 units) tablet Take 1 tablet by mouth daily Takes in the afternoon     No current facility-administered medications for this visit.           Review of Systems:    ROS: 14 point ROS neg other than the symptoms noted above in the HPI.          Physical Exam:   Weight 92.1 kg (203 lb), not currently breastfeeding.  Data Unavailable, Body mass index is 39.65 kg/m ., 203 lbs 0 oz  Gen appearance: Age-appropriate appearing female in NAD.   HEENT:  EOMI, conjunctiva clear/white. Normal ROM of neck for age.   Psych:  alert , In no acute distress.  Neuro:  A&Ox3  Resp:  Normal respiratory effort; not in acute respiratory distress.          Data:    Labs:    Creatinine   Date Value Ref Range Status   11/27/2023 1.19 (H) 0.51 - 0.95 mg/dL Final   07/07/2021 1.01 0.52 - 1.04 mg/dL Final

## 2024-01-16 NOTE — NURSING NOTE
Is the patient currently in the state of [place]? YES    Visit mode:VIDEO    If the visit is dropped, the patient can be reconnected by: VIDEO VISIT: Text to cell phone:   Telephone Information:   Mobile [phone]       Will anyone else be joining the visit? NO  (If patient encounters technical issues they should call [phone]    How would you like to obtain your AVS? MyChart    Are changes needed to the allergy or medication list? No    Reason for visit: RECHECK    Mora ALANIS

## 2024-01-16 NOTE — LETTER
1/16/2024       RE: Chas[place] Hodgson  [address] [facility] [address]     Dear Colleague,    Thank you for referring your patient, Chasity Hodgson, to the [facility] UROLOGY CLINIC [place] at [facility]. Please see a copy of my visit note below.    Video-Visit Details    Type of service:  Video Visit    Video Start Time:  11:04 AM    Video End Time:11:20 AM    Originating Location (pt. Location): Home in [place]    Distant Location (provider location): onsite    Platform used for Video Visit: [facility]    Urology Clinic    Name: Chasity Hodgson    MRN: [id number]   YOB: 1945  Accompanied at today's visit by:self              Assessment and Plan:   78 year old female with OAB and neurogenic bladder s/p botox on 100 units on 9/18/23.    - follow-up for nurse visit for PVR check.  - Will discuss repeating botox with Dr. Zendejas vs increasing dose of botox given her symptoms returning prior to 6 months.   - After discussing the assessment and plan with patient, patient verbalized understanding and agreed to the above plan. All questions answered.     16 minutes were spent today on the date of the encounter in reviewing the EMR, direct patient care, coordination of care and documentation in addition to reviewing labs.     Lupe Mcrae PA-C  January 16, 2024    Patient Care Team:  Shola Benoit MD as PCP - General (Internal Medicine)  Shola Benoit MD as Assigned PCP  Renetta Meyer MD as MD (Dermatology)  ROMI Roque MD as MD (INTERNAL MEDICINE - ENDOCRINOLOGY, DIABETES & METABOLISM)  Ulises Pantoja MD as MD (Gastroenterology)  Kishore Ferrera MD as MD (Orthopedics)  Tara Cornejo, RN as Diabetes Educator (Diabetes Education)  Catrachita Prather, PharmD as Pharmacist (Pharmacist)  Odette Wetson MD as MD (Endocrinology, Diabetes, and Metabolism)  Odette Weston MD as Assigned Endocrinology Provider  Goltz,  "Norman Mccullough PA-C as Assigned Neuroscience Provider  Terra Bridges PA-C as Physician Assistant (Urology)  Amber Chan APRN CNP as Assigned Heart and Vascular Provider  Marbella Martinez PA-C as Physician Assistant (Urology)  Yolanda Tapia PA-C as Physician Assistant (Endocrinology, Diabetes, and Metabolism)  Kaelyn Hennessy [facility] as Lead Care Coordinator  Renetta Meyer MD as Assigned Surgical Provider  Nikia Wan CHW as Community Health Worker (Primary Care - CC)  Lexie Louie MA as Financial Resource Worker  Lupe Mcrae PA-C as Physician Assistant  SELF, REFERRED          Chief Complaint:   OAB          History of Present Illness:   January 16, 2024    HISTORY: Chasity Hodgson is a 78 year old female is here for follow-up. We have been following her for OAB and neurogenic bladder. UDS on 7/6/23 showed DO/DOI. Has failed OAB medications. S/p cysto with botox 100 units on 9/18/23 and was pleased with botox. Here for followup. No recent UTIs in remote past. States the botox was \"fantastic the first 2 months\". However started having large episodes of leakage without warning. Also has leakage when running water from the sink will have strong urge and at times UUI. However, her symptoms are still not as bad as what it was prior to her botox procedure. Denies s/s of UTI. Feels like she empties her bladder completely most of the time. Of note, last encounter reported numbness of her face and advised to go to ER. Patient states her symptoms were secondary to a new tooth paste she was trying. Has not had issues since she discontinued the tooth paste. Patient voices no other concerns at this time.            Past Medical History:     Past Medical History:   Diagnosis Date    Abdominal adhesions 1984, 96,99    s/p lysis    Abdominal adhesions     Acne rosacea     Allergic rhinitis     Allergic rhinitis, cause unspecified     Alopecia     Anemia     CAD (coronary artery " disease)     Carotid stenosis     CKD (chronic kidney disease) stage 2, GFR 60-89 ml/min     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)     CPAP (continuous positive airway pressure) dependence     Depression     Diabetic gastroparesis (H)     Diet-controlled type 2 diabetes mellitus (H)     DM2 (diabetes mellitus, type 2) (H)     DVT (deep venous thrombosis) (H)     DVT of axillary vein, acute right (H)     Fibromyalgia     Gastro-oesophageal reflux disease     GERD (gastroesophageal reflux disease)     Hernia of unspecified site of abdominal cavity without mention of obstruction or gangrene     bilateral    Hernia, abdominal     History of blood transfusion     10/ 1980    History of thrombophlebitis     HTN (hypertension)     HTN (hypertension)     Hypertriglyceridemia     Hypertriglyceridemia     Hypokalemia     Hypothyroidism     Mumps     Obstructive sleep apnea     ANNETTE (obstructive sleep apnea)     ANNETTE on CPAP     Osteoarthritis of glenohumeral joint     Papillary carcinoma of thyroid (H)     s/p thyroidectomy - Ruegemer    Papillary carcinoma of thyroid (H)     PE (pulmonary embolism)     Poliomyelitis     poor circulation right leg    Poliomyelitis     Postsurgical hypothyroidism     s/p papillary thryoid cancer - Ruegemer    Pulmonary embolism (H)     Pulmonary embolus (H)     Pulmonary nodule     Rosacea     S/P carpal tunnel release     bilateral    S/P hardware removal 01/2014    still with lingering foot pain    S/P shoulder surgery     bilateral    Septic joint (H)     right knee    Septic joint of right knee joint (H)     Venous insufficiency     Venous insufficiency     Venous thrombosis 1999    right axillary vein          Past Surgical History:     Past Surgical History:   Procedure Laterality Date    AMPUTATE TOE(S)  03/15/2012    Procedure:AMPUTATE TOE(S); Surgeon:RUBEN MOSES; Location:[facility] OR    AMPUTATE TOE(S)      APPENDECTOMY  1972    APPENDECTOMY      ARTHRODESIS FOOT  03/15/2012     Procedure:ARTHRODESIS FOOT; RIGHT TRIPLE ARTHRODESIS, FIFTH TOE AMPUTATION, LATERAL LIGAMENT RECONSTRUCTION, TENDON TRANSFER AND RELEASE [MINI C-ARM, ARTHREX 4.5 AND 6.7 STAPLES, BIOCOMPOSITE TENODESIS SCREWS]; Surgeon:RUBEN MOSES; Location: OR    ARTHRODESIS FOOT Right     Right foot triple arthrodesis and removal of hardware    ARTHROSCOPY SHOULDER  06/25/2015    REVISION SUBACROMIAL DECOMPRESSION, EXCISION OF GANGLION CYST, DEBRIDEMENT AND EXCISION OF THE GLENOHUMERAL JOINT GANGLION CYST, CORACOID DECOMPRESSION, POSSIBLE SUBSCAPULARIS REPAIR AND OPEN SUBSCAPULARIS BICEP    ARTHROSCOPY SHOULDER ROTATOR CUFF REPAIR      BIOPSY  Thyroid 2002    BLADDER SURGERY      BOTOX INJECTION MEDICAL      BREAST SURGERY  Biopsy    CHOLECYSTECTOMY      CHOLECYSTECTOMY      COLONOSCOPY  2018    COLOSTOMY  02/07/2012    Procedure:COLOSTOMY; CREATION OF SIGMOID COLOSTOMY AND EXTENSIVE  LYSIS OF ADHESIONS; Surgeon:MONTSERRAT BENDER; Location: OR    COLOSTOMY      CV ANGIOGRAM RENAL BILATERAL Bilateral 11/17/2023    Procedure: Angiogram Renal Bilateral;  Surgeon: Ankit Bhatia MD;  Location:  HEART CARDIAC CATH LAB    CV CORONARY ANGIOGRAM N/A 11/17/2023    Procedure: Coronary Angiogram;  Surgeon: Ankit Bhatia MD;  Location:  HEART CARDIAC CATH LAB    CYSTOSCOPY      CYSTOSCOPY, INJECT BOTOX N/A 09/18/2023    Procedure: CYSTOSCOPY, WITH BOTULINUM TOXIN INJECTION;  Surgeon: Mishel Zendejas MD;  [place] [facility] [place]    EYE SURGERY      GENITOURINARY SURGERY  1999    GI SURGERY      weakened rectal sphincter with artificial stimulator    HERNIA REPAIR  1976    HYSTERECTOMY TOTAL ABDOMINAL      LAPAROTOMY, LYSIS ADHESIONS, COMBINED  02/07/2012    Procedure:COMBINED LAPAROTOMY, LYSIS ADHESIONS; Surgeon:MONTSERRAT BENDER; Location: OR    RELEASE CARPAL TUNNEL      RELEASE TENDON FOOT  03/15/2012    Procedure:RELEASE TENDON FOOT; Surgeon:RUBEN MOSES; Location: OR    REMOVE  HARDWARE FOOT  12/13/2012    Procedure: REMOVE HARDWARE FOOT;  RIGHT FOOT REMOVAL OF HARDWARE;  Surgeon: Sukhdeep Metz MD;  Location: [facility] OR    SHOULDER SURGERY  11/12/2020    LEFT SHOULDER HEMIARHTROPLASTY, BICEP TENODESIS    SOFT TISSUE SURGERY  2018, 2020    TENDON RELEASE      foot    THYROIDECTOMY      ZZC FREEING BOWEL ADHESION,ENTEROLYSIS      1986, 1996, 1999    ZZC NONSPECIFIC PROCEDURE      throidectomy    ZZC TOTAL ABDOM HYSTERECTOMY  1980    + BSO            Social History:     Social History     Tobacco Use    Smoking status: Never    Smokeless tobacco: Never   Substance Use Topics    Alcohol use: Never            Family History:     Family History   Problem Relation Age of Onset    Arthritis Mother     Hypertension Mother     Cerebrovascular Disease Mother     Obesity Mother     Heart Disease Mother         MI's    Hypertension Father     Respiratory Father         Adult RDS    Diabetes Father     Arthritis Sister     Cancer Sister     Diabetes Sister     Hypertension Sister     Breast Cancer Sister     Depression Sister     Thyroid Disease Sister     Obesity Sister     Arthritis Sister     Hypertension Sister     Thyroid Disease Sister     Osteoporosis Sister     Obesity Sister     Cancer Sister         colon polup    Hypertension Sister     Osteoporosis Sister     Obesity Sister     Colon Cancer Sister     Lipids Sister     Obesity Sister     Obesity Maternal Grandmother         Dad s mother    Skin Cancer Maternal Grandmother         skin cancer unknown    Cancer Maternal Grandmother         unknown skin cancer on face    Obesity Paternal Grandmother         Mothers mother    Heart Disease Brother         MI at 54    Other Cancer Brother         Lung & bone    Lipids Brother     Hypertension Brother     Diabetes Brother     Hyperlipidemia Brother     Ovarian Cancer No family hx of           Allergies:     Allergies   Allergen Reactions    Ketorolac Tromethamine Difficulty breathing      Shortness of breath to tablets only per the patient    Nsaids Difficulty breathing     Increased creatinine    Celecoxib Itching and Rash    Morphine And Related Itching     With higher doses. Pt denies this allergy 11/16/2023    Codeine Itching    Crestor [Rosuvastatin] Muscle Pain (Myalgia)    No Clinical Screening - See Comments Itching     Fragrance    Vioxx Other (See Comments)     Heart races    Conjugated Estrogens Rash    Sulfa Antibiotics Rash          Medications:     Current Outpatient Medications   Medication Sig    acetaminophen (TYLENOL) 500 MG tablet Take 1,000 mg by mouth every 8 hours as needed    acetaminophen-caffeine (EXCEDRIN TENSION HEADACHE) 500-65 MG TABS Take 2 tablets by mouth every 6 hours as needed for mild pain    amLODIPine (NORVASC) 2.5 MG tablet Take 1 tablet (2.5 mg) by mouth daily    amoxicillin (AMOXIL) 500 MG capsule Takes 4 caps before every dental appointments.    aspirin (ASA) 81 MG EC tablet Take 81 mg by mouth daily    azelastine (ASTEPRO) 0.15 % nasal spray Spray 1 spray into both nostrils daily as needed    BIOTIN PO Take 1 capsule by mouth at bedtime Unknown dose    Calcium Carbonate-Vitamin D (CALTRATE 600+D PO) Take 1 tablet by mouth daily Takes in the afternoon    carvedilol (COREG) 12.5 MG tablet Take 1 tablet (12.5 mg) by mouth 2 times daily (with meals)    clotrimazole (LOTRIMIN) 1 % cream Apply topically as needed (rash/yeast infection)    Continuous Blood Gluc Sensor (FREESTYLE BRANDY 3 SENSOR) MISC 1 each every 14 days    Cyanocobalamin (B-12 PO) Take 1 tablet by mouth daily Unknown dose. Takes in the afternoon    empagliflozin (JARDIANCE) 10 MG TABS tablet Take 1 tablet (10 mg) by mouth daily    ezetimibe (ZETIA) 10 MG tablet Take 1 tablet (10 mg) by mouth daily    famotidine (PEPCID) 20 MG tablet Take 2 tablets (40 mg) by mouth daily    fenofibrate (TRICOR) 145 MG tablet Take 1 tablet (145 mg) by mouth daily    fexofenadine (ALLEGRA) 180 MG tablet Take 180 mg by  mouth daily Takes in the afternoon    fluocinolone acetonide (DERMA SMOOTHE/FS BODY) 0.01 % external oil Apply 2 mL to scalp once per week. Massage into scalp. Can be left in overnight or washed out after 4-6 hours.    fluticasone (FLONASE) 50 MCG/ACT spray Spray 2 sprays in nostril daily 2 sprays in each nostril qd    furosemide (LASIX) 20 MG tablet Take 1 tablet (20 mg) by mouth daily as needed (lower extremity edema)    icosapent ethyl (VASCEPA) 1 g CAPS capsule Take 1 g by mouth every evening    insulin aspart (NOVOLOG FLEXPEN) 100 UNIT/ML pen Inject 10 Units Subcutaneous 2 times daily (with meals) Breakfast and supper, pt eats minimal at lunch    insulin glargine (LANTUS PEN) 100 UNIT/ML pen Inject 30 Units Subcutaneous every morning If BG<100, take 50% of usual dose (15 unit(s) )    insulin pen needle (B-D U/F) 31G X 5 MM miscellaneous Use 1 pen needles daily or as directed.    levothyroxine (SYNTHROID/LEVOTHROID) 112 MCG tablet Take 1 tablet (112 mcg) by mouth daily    Lidocaine (LIDOCARE) 4 % Patch Place 2-3 patches onto the skin every 24 hours To prevent lidocaine toxicity, patient should be patch free for 12 hrs daily.    minoxidil (LONITEN) 2.5 MG tablet Take half tablet (1.25 mg) by mouth once daily    MULTIPLE VITAMIN PO Take 1 tablet by mouth daily    nitroGLYcerin (NITROSTAT) 0.4 MG sublingual tablet Place 1 tablet (0.4 mg) under the tongue every 5 minutes as needed for chest pain (no more than 3 in one hour; after 3rd, call 911.)    nystatin (MYCOSTATIN) cream Apply topically daily as needed (groin)    nystatin-triamcinolone (MYCOLOG II) cream Apply topically daily as needed (genital itching)    olmesartan (BENICAR) 40 MG tablet Take 1 tablet (40 mg) by mouth daily    ondansetron (ZOFRAN) 4 MG tablet Take 1 tablet (4 mg) by mouth every 6 hours as needed Reported on 3/20/2017    order for DME Equipment being ordered: Compression stockings - Knee High; 20-30 mmHg compression - note would like adhesive  band to keep the stocking from sliding down    pantoprazole (PROTONIX) 40 MG EC tablet Take 40 mg by mouth 2 times daily    Polyethylene Glycol 400 (BLINK TEARS) 0.25 % GEL Apply to eye At Bedtime    polyethylene glycol 400 (BLINK TEARS) 0.25 % SOLN ophthalmic solution Place 1 drop into both eyes daily    sucralfate (CARAFATE) 1 GM/10ML suspension Take 1 g by mouth 4 times daily as needed (GERD)    tretinoin (RETIN-A) 0.025 % external cream Apply topically nightly as needed (facial lesions) Use every night as tolerated - spot treat lesion    triamcinolone (KENALOG) 0.1 % external ointment Apply topically every other day    Vitamin D3 50 mcg (2000 units) tablet Take 1 tablet by mouth daily Takes in the afternoon     No current facility-administered medications for this visit.           Review of Systems:    ROS: 14 point ROS neg other than the symptoms noted above in the HPI.          Physical Exam:   Weight 92.1 kg (203 lb), not currently breastfeeding.  Data Unavailable, Body mass index is 39.65 kg/m ., 203 lbs 0 oz  Gen appearance: Age-appropriate appearing female in NAD.   HEENT:  EOMI, conjunctiva clear/white. Normal ROM of neck for age.   Psych:  alert , In no acute distress.  Neuro:  A&Ox3  Resp:  Normal respiratory effort; not in acute respiratory distress.          Data:    Labs:    Creatinine   Date Value Ref Range Status   11/27/2023 1.19 (H) 0.51 - 0.95 mg/dL Final   07/07/2021 1.01 0.52 - 1.04 mg/dL Final

## 2024-01-17 ENCOUNTER — TELEPHONE (OUTPATIENT)
Dept: DERMATOLOGY | Facility: CLINIC | Age: 79
End: 2024-01-17
Payer: MEDICARE

## 2024-01-17 NOTE — TELEPHONE ENCOUNTER
Patient Contacted  and schedule the following:    Appointment type: Return HL  Provider: Dr. Meyer  Return date: 7/29/24  Specialty phone number: [phone]

## 2024-01-19 ENCOUNTER — PATIENT OUTREACH (OUTPATIENT)
Dept: CARE COORDINATION | Facility: CLINIC | Age: 79
End: 2024-01-19
Payer: MEDICARE

## 2024-01-19 NOTE — LETTER
[facility]  Patient Centered Plan of Care  About Me:        Patient Name:  Chasity Brooke    YOB: 1945  Age:         78 year old   Jean MRN:    [id number] Telephone Information:  Home Phone [phone]   Mobile [phone]       Address:  [address] [place] [address] Email address:  [email or web address]      Emergency Contact(s)    Name Relationship Lgl Grd Work Phone Home Phone Mobile Phone   1. LETICIA GALLEGOS Daughter No  [phone]   2. JOSE BROOKE Son No NONE [phone]   3. NONE Spouse No              Primary language:  English     needed? No   [employer]:  [phone] op. 1  Other communication barriers:None    Preferred Method of Communication:  [place]  Current living arrangement: I live in a private home    Mobility Status/ Medical Equipment: Independent w/Device        Health Maintenance  Health Maintenance Reviewed: Due/Overdue   Health Maintenance Due   Topic Date Due    URINE DRUG SCREEN  Never done    RSV VACCINE (Pregnancy & 60+) (1 - 1-dose 60+ series) Never done    ZOSTER IMMUNIZATION (2 of 3) 10/24/2016    MEDICARE ANNUAL WELLNESS VISIT  10/12/2023           My Access Plan  Medical Emergency 911   Primary Clinic Line [facility] [phone]   24 Hour Appointment Line [phone] or  [phone]) (toll-free)   24 Hour Nurse Line [phone] (toll-free)   Preferred Urgent Care [facility], [phone]     Preferred Hospital [facility]  [phone]     Preferred [facility] [address]     Behavioral Health Crisis Line The National Suicide Prevention Lifeline at 1-818.255.7167 or Text/Call 948           My Care Team Members  Patient Care Team         Relationship Specialty Notifications Start End    Shola Benoit MD PCP - General Internal Medicine  10/22/12      Phone: [phone] Pager: [phone] Fax: [phone] [address] [facility] [address]    Shola Benoit MD Assigned PCP   10/4/12     Phone: [phone] Pager: [phone] Fax: [phone] [address] [facility] [address]    Renetta Meyer MD MD Dermatology  4/17/15     Phone: [phone] Fax: [phone]         [address] [facility] [address] [facility] [address]    ROMI Roque MD MD INTERNAL MEDICINE - ENDOCRINOLOGY, DIABETES & METABOLISM  2/23/16     Phone: [phone] Fax: [phone]         ENDOCRINE CLINIC [place] [id number] [facility] [address]    Ulises Pantoja MD MD Gastroenterology  9/12/17     Phone: [phone] Fax: [phone]         [facility] [address] [place] [address]    Kishore Ferrera MD MD Orthopedics  2/23/18     Phone: [phone] Fax: [phone]         [address] [place] [address] [address] [facility] [address]    Tara Cornejo, RN Diabetes Educator Diabetes Education  8/22/19     Phone: [phone] Fax: [phone]        Catrachita Prather, PharmD Pharmacist Pharmacist  7/26/20     Phone: [phone] Fax: [phone] [address] [facility] [address]    Odette Weston MD MD Endocrinology, Diabetes, and Metabolism  9/13/21     Phone: [phone] Fax: [phone]         [facility] [place] [address]    Odette Weston MD Assigned Endocrinology Provider   10/3/21     Phone: [phone] Fax: [phone]         [facility] [place] [address]    Goltz, Bennett Ezra, PA-C Assigned Neuroscience Provider   9/17/22     Phone: [phone] Fax: [phone] [address] [facility] [address]    Terra Bridges PA-C Physician Assistant Urology  10/14/22     Phone: [phone] Fax: [phone]         [address] [facility] [address]    Amber Chan APRN CNP Assigned Heart and Vascular Provider   4/8/23      Phone: [phone] Fax: [phone] [address] [place] [address]    Marbella Martinez PA-C Physician Assistant Urology  5/23/23     Phone: [phone] Fax: [phone]         [address] [facility] [address]    Yolanda Tapia PA-C Physician Assistant Endocrinology, Diabetes, and Metabolism  6/9/23     Phone: [phone] Fax: [phone]         [address] [place] [address]    Kaelyn Henenssy [facility] Lead Care Coordinator  Admissions 11/27/23     Renetta Meyer MD Assigned Surgical Provider   12/2/23     Phone: [phone] Fax: [phone]         [address] [place] [address]    Nikia Wan CHW Community Health Worker Primary Care - CC Admissions 12/5/23     Phone: [phone]         Lexie Louie MA Financial Resource Worker   12/6/23     Phone: [phone]         Lupe Mcrae PA-C Physician Assistant   1/16/24     Phone: [phone] Fax: [phone] [address] [facility] [address]                My Care Plans  Self Management and Treatment Plan    Care Plan  Care Plan: Community Resources       Problem: Insufficient In-home support       Note:     Goal Statement: Sammie will continue working with Care Coordination to ensure her needs are met for overall health and wellbeing.  Date Goal Set: 12/5/23  Barriers: Limited finances  Strengths: Strong support system  Date to Achieve By: 12/5/24  Patient expressed understanding of goal: yes  Action steps to achieve this goal:  1. I will continue to follow up with medical team as needed  2. I will work with FRW on discussing and applying for E/W program  4. I will consider options for chore services in my home from CCSW and CHW  5. I will outreach to Care Coordination  for further questions or concerns          Goal: Establish adequate home support       This Visit's Progress: 10%                            Action Plans on File:                       Advance Care  Plans/Directives:   Advanced Care Plan/Directives on file:   Yes    Status of Document(s):   On File and Validated    Advanced Care Plan/Directives Type:   Advanced Directive - On File           My Medical and Care Information  Problem List   Patient Active Problem List   Diagnosis    Low back pain    Mixed hyperlipidemia    Benign essential hypertension    Fibromyalgia    Allergic rhinitis    ANNETTE (obstructive sleep apnea)    Cavovarus deformity of foot    History of DVT (deep vein thrombosis)    Hypokalemia    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)    Anemia due to blood loss, acute    ACP (advance care planning)    Postsurgical hypothyroidism    Type 2 diabetes, HbA1c goal < 7% (H)    Gastroparesis    Papillary carcinoma of thyroid (H)    Alopecia    Pulmonary nodule    Esophageal reflux    Dermatitis    Acne rosacea    Diabetic gastroparesis (H)    Poliomyelitis    Left knee pain    Carotid stenosis    Right shoulder pain    Type 2 diabetes mellitus with other specified complication (H)    Insufficiency, arterial, peripheral (H24)    Allergic dermatitis due to other chemical product    Normocytic anemia    Morbid obesity with BMI of 40.0-44.9, adult (H)    Mild major depression (H)    CKD (chronic kidney disease) stage 3, GFR 30-59 ml/min (H)    Renal artery stenosis (H24)    Neuropathic pain    Mild major depression (H24)    Venous insufficiency    Avascular necrosis of bone of hip, left (H)    Contact dermatitis    Diabetic nephropathy associated with type 2 diabetes mellitus (H)    DVT of upper extremity (deep vein thrombosis) (H)    Dysphagia    Edema of lower extremity    History of colonic polyps    History of pulmonary embolus (PE)    Iron deficiency anemia    Inguinal pain    Moderate protein-calorie malnutrition (H24)    Osteoarthritis of left hip    Primary insomnia    Right lower quadrant pain    Status post total shoulder arthroplasty, left    Surgical aftercare, musculoskeletal system    Vitamin D deficiency     Lymphedema    Atypical chest pain    Episodic tension-type headache, not intractable    Blurred vision    Pain of cheek    Abdominal adhesions    Mixed incontinence    OAB (overactive bladder)    Cellulitis of lower extremity, unspecified laterality    Cellulitis    Acute chest pain    Venous stasis ulcer of left lower leg with edema of left lower leg (H)      Current Medications and Allergies:  See printed Medication Report.    Care Coordination Start Date: 11/27/2023   Frequency of Care Coordination: monthly, more frequently as needed     Form Last Updated: 01/19/2024

## 2024-01-19 NOTE — PROGRESS NOTES
Clinic Care Coordination Contact  Care Coordination Clinician Chart Review    Situation: Patient chart reviewed by Care Coordinator.       Background: Care Coordination Program started: 11/27/2023. Initial assessment completed and patient-centered care plan(s) were developed with participation from patient. Lead CC handed patient off to CHW for continued outreaches.       Assessment: Per chart review, patient outreach completed by CC CHW on 1/16/24.  Patient is actively working to accomplish goal(s). Patient's goal(s) appropriate and relevant at this time. Patient is not due for updated Plan of Care.  Assessments will be completed annually or as needed/with change of patient status.      Care Plan: Community Resources       Problem: Insufficient In-home support       Note:     Goal Statement: Sammie will continue working with Care Coordination to ensure her needs are met for overall health and wellbeing.  Date Goal Set: 12/5/23  Barriers: Limited finances  Strengths: Strong support system  Date to Achieve By: 12/5/24  Patient expressed understanding of goal: yes  Action steps to achieve this goal:  1. I will continue to follow up with medical team as needed  2. I will work with FRW on discussing and applying for E/W program  4. I will consider options for chore services in my home from CCSW and CHW  5. I will outreach to Care Coordination  for further questions or concerns          Goal: Establish adequate home support       This Visit's Progress: 10%                               Plan/Recommendations: The patient will continue working with Care Coordination to achieve goal(s) as above. CHW will continue outreaches at minimum every 30 days and will involve Lead CC as needed or if patient is ready to move to Maintenance. Lead CC will continue to monitor CHW outreaches and patient's progress to goal(s) every 6 weeks.     Plan of Care updated and sent to patient: Yes, via Carlos Alberto Hennessy   [facility]  Clinic Care Coordinator  [facility]  [phone]  [email or web address]

## 2024-01-22 ENCOUNTER — LAB (OUTPATIENT)
Dept: LAB | Facility: CLINIC | Age: 79
End: 2024-01-22
Payer: MEDICARE

## 2024-01-22 DIAGNOSIS — I25.10 CORONARY ARTERY DISEASE INVOLVING NATIVE CORONARY ARTERY OF NATIVE HEART WITHOUT ANGINA PECTORIS: ICD-10-CM

## 2024-01-22 LAB
ALT SERPL W P-5'-P-CCNC: 16 U/L (ref 0–50)
ANION GAP SERPL CALCULATED.3IONS-SCNC: 10 MMOL/L (ref 7–15)
BUN SERPL-MCNC: 27.5 MG/DL (ref 8–23)
CALCIUM SERPL-MCNC: 9.2 MG/DL (ref 8.8–10.2)
CHLORIDE SERPL-SCNC: 108 MMOL/L (ref 98–107)
CHOLEST SERPL-MCNC: 137 MG/DL
CREAT SERPL-MCNC: 1.15 MG/DL (ref 0.51–0.95)
DEPRECATED HCO3 PLAS-SCNC: 25 MMOL/L (ref 22–29)
EGFRCR SERPLBLD CKD-EPI 2021: 49 ML/MIN/1.73M2
FASTING STATUS PATIENT QL REPORTED: YES
GLUCOSE SERPL-MCNC: 131 MG/DL (ref 70–99)
HDLC SERPL-MCNC: 33 MG/DL
LDLC SERPL CALC-MCNC: 70 MG/DL
NONHDLC SERPL-MCNC: 104 MG/DL
POTASSIUM SERPL-SCNC: 5.1 MMOL/L (ref 3.4–5.3)
SODIUM SERPL-SCNC: 143 MMOL/L (ref 135–145)
TRIGL SERPL-MCNC: 168 MG/DL

## 2024-01-22 PROCEDURE — 80048 BASIC METABOLIC PNL TOTAL CA: CPT | Performed by: INTERNAL MEDICINE

## 2024-01-22 PROCEDURE — 80061 LIPID PANEL: CPT | Performed by: INTERNAL MEDICINE

## 2024-01-22 PROCEDURE — 36415 COLL VENOUS BLD VENIPUNCTURE: CPT | Performed by: INTERNAL MEDICINE

## 2024-01-22 PROCEDURE — 84460 ALANINE AMINO (ALT) (SGPT): CPT | Performed by: INTERNAL MEDICINE

## 2024-01-23 ENCOUNTER — ALLIED HEALTH/NURSE VISIT (OUTPATIENT)
Dept: UROLOGY | Facility: CLINIC | Age: 79
End: 2024-01-23
Payer: MEDICARE

## 2024-01-23 DIAGNOSIS — N32.81 OAB (OVERACTIVE BLADDER): Primary | ICD-10-CM

## 2024-01-23 LAB — RESIDUAL VOLUME (RV) (EXTERNAL): 20

## 2024-01-23 PROCEDURE — 51798 US URINE CAPACITY MEASURE: CPT

## 2024-01-23 NOTE — PROGRESS NOTES
Chasity Hodgson comes into clinic today at the request of Lupe Mcrae PA-C Ordering Provider for PVR.    Pt's post void residual today was 20 mL by bladder scan.    This service provided today was under the supervising provider of the andrea Crawley CNP, who was available if needed.    Jessica Sam, CMA

## 2024-01-24 ENCOUNTER — OFFICE VISIT (OUTPATIENT)
Dept: CARDIOLOGY | Facility: CLINIC | Age: 79
End: 2024-01-24
Payer: MEDICARE

## 2024-01-24 VITALS
SYSTOLIC BLOOD PRESSURE: 196 MMHG | WEIGHT: 208 LBS | OXYGEN SATURATION: 100 % | DIASTOLIC BLOOD PRESSURE: 68 MMHG | BODY MASS INDEX: 40.84 KG/M2 | HEIGHT: 60 IN | HEART RATE: 75 BPM

## 2024-01-24 DIAGNOSIS — N39.46 MIXED INCONTINENCE: ICD-10-CM

## 2024-01-24 DIAGNOSIS — N32.81 OAB (OVERACTIVE BLADDER): Primary | ICD-10-CM

## 2024-01-24 DIAGNOSIS — R07.89 ATYPICAL CHEST PAIN: ICD-10-CM

## 2024-01-24 DIAGNOSIS — I25.10 CORONARY ARTERY DISEASE INVOLVING NATIVE CORONARY ARTERY OF NATIVE HEART WITHOUT ANGINA PECTORIS: Primary | ICD-10-CM

## 2024-01-24 DIAGNOSIS — I65.23 BILATERAL CAROTID ARTERY STENOSIS: ICD-10-CM

## 2024-01-24 PROCEDURE — 99214 OFFICE O/P EST MOD 30 MIN: CPT | Performed by: INTERNAL MEDICINE

## 2024-01-24 PROCEDURE — 93000 ELECTROCARDIOGRAM COMPLETE: CPT | Performed by: INTERNAL MEDICINE

## 2024-01-24 RX ORDER — NITROGLYCERIN 0.4 MG/1
0.4 TABLET SUBLINGUAL EVERY 5 MIN PRN
Qty: 25 TABLET | Refills: 3 | Status: SHIPPED | OUTPATIENT
Start: 2024-01-24

## 2024-01-24 NOTE — LETTER
1/24/2024    Shola Benoit MD, MD  [address]  [place] [address]    RE: Chasity Hodgson       Dear Colleague,     I had the pleasure of seeing Chasity SHARLENE Hodgson in the [facility].      Cardiology Clinic          Assessment & Plan         1.  Non-STEMI with presentation and November 2023, angiogram unchanged from 2018 and normal renal angiography   2.  Chronic kidney disease  3.  Hypertension  4.  PAD  5.  Diabetes mellitus  6.  Hyperlipidemia - Statin intolerance   7.  Hypothyroidism  8.  History of DVT  9.  ANNETTE  10.  History of polio age to right foot brace due to deformity  11.  History of colorectal surgery currently has a colostomy, is in need of a minor procedure, outpatient  12.  Left hip surgery due to avascular necrosis at [facility].  Patient did well with no cardiovascular sequelae     Recommendations     1.  Atherosclerotic coronary artery disease : nonocclusive coronary artery disease by cardiac cath.  Stable symptoms.  We will continue with secondary prevention    2.  Hyperlipidemia: Has not been able to tolerate statins due to multiple allergies.  Continue with her current regimen of fenofibrate and zetia    3.  Reviewed patient's clinical course when she was admitted in November 2023.  At that time coronary angiogram and renal angiography were performed and stable anatomy was demonstrated.    4.  EKG today demonstrates normal sinus rhythm and is at baseline    5.  Refilled her sublingual nitroglycerin.    6.  Reviewed her outpatient blood pressure log.  She is normotensive and in fact states that she had hypotension recently and her PCP decreased her amlodipine.    7.  We will have patient return to clinic in fall 2024 with pre-clinic carotid ultrasound.      Ankit Bhatia MD         HPI:     Patient is a very pleasant 78 year-old female who has been followed by my colleague Dr. Ortiz for who has recently retired.  I last saw patient in 2022.   Her cardiac  history is notable for nonocclusive coronary artery disease by coronary angiogram in 2018.  This was performed for pre-operative clearance for right shoulder surgery.  She underwent her surgery without difficulty.  Her other past medical history is as noted above.  She has long-standing history of hypertension and is on a multidrug regimen.  She also has chronic kidney disease.  She is in need of a minor colorectal procedure per patient.  This is an outpatient procedure that require some revision of her previous colostomy however through noninvasive measures.  Cardiac wise she is at baseline with no active symptoms.    Since my last visit she has mainly orthopedic issues.  She had avascular necrosis for which she underwent left hip surgery in May 2020.  She had a uneventful hospital course and tells me that there was no mention of any cardiovascular sequelae during her procedure.  Her  unfortunately has had difficulty with his Parkinson's and has now moved into a nursing home in March 2020.  As a result [facility]  She was worried about maintaining a house by herself.  Otherwise is taking all of her medications and is here for her yearly visit.     This past year she was admitted for chest pain and had an evaluation with coronary angiography.  No changes were noted when compared to her prior study in 2018.          Echo 2023  The left ventricle is normal in size.  Left ventricular systolic function is normal. The visual ejection fraction is  60-65%.  Normal left ventricular wall motion  The aortic valve is trileaflet with aortic valve sclerosis. No hemodynamically  significant valvular aortic stenosis.  The right ventricle is normal in structure, function and size.  Compared to prior study, there is no significant change.      Exam:    LMP  (LMP Unknown)   GENERAL: Healthy, alert and no distress  EYES: Eyes grossly normal to inspection.  No discharge or erythema,  or obvious scleral/conjunctival abnormalities.  RESP: No audible wheeze, cough, or visible cyanosis.  No visible retractions or increased work of breathing.   CV:  RRR  Lungs: CTA   Abdomen: + BS soft NT  Ext: no edema   SKIN: Visible skin clear. No significant rash, abnormal pigmentation or lesions.  NEURO: Cranial nerves grossly intact.  Mentation and speech appropriate for age.  PSYCH: Mentation appears normal, affect normal/bright, judgement and insight intact, normal speech and appearance well-groomed.          Thank you for allowing me to participate in the care of your patient.      Sincerely,     Ankit Bhatia MD     [facility]  cc:   Ankit Bhatia MD  [address]  [place] [address]

## 2024-01-24 NOTE — PROGRESS NOTES
Cardiology Clinic          Assessment & Plan         1.  Non-STEMI with presentation and November 2023, angiogram unchanged from 2018 and normal renal angiography   2.  Chronic kidney disease  3.  Hypertension  4.  PAD  5.  Diabetes mellitus  6.  Hyperlipidemia - Statin intolerance   7.  Hypothyroidism  8.  History of DVT  9.  ANNETTE  10.  History of polio age to right foot brace due to deformity  11.  History of colorectal surgery currently has a colostomy, is in need of a minor procedure, outpatient  12.  Left hip surgery due to avascular necrosis at [facility].  Patient did well with no cardiovascular sequelae     Recommendations     1.  Atherosclerotic coronary artery disease : nonocclusive coronary artery disease by cardiac cath.  Stable symptoms.  We will continue with secondary prevention    2.  Hyperlipidemia: Has not been able to tolerate statins due to multiple allergies.  Continue with her current regimen of fenofibrate and zetia    3.  Reviewed patient's clinical course when she was admitted in November 2023.  At that time coronary angiogram and renal angiography were performed and stable anatomy was demonstrated.    4.  EKG today demonstrates normal sinus rhythm and is at baseline    5.  Refilled her sublingual nitroglycerin.    6.  Reviewed her outpatient blood pressure log.  She is normotensive and in fact states that she had hypotension recently and her PCP decreased her amlodipine.    7.  We will have patient return to clinic in fall 2024 with pre-clinic carotid ultrasound.      Ankit Bhatia MD         HPI:     Patient is a very pleasant 78 year-old female who has been followed by my colleague Dr. Ortiz for who has recently retired.  I last saw patient in 2022.   Her cardiac history is notable for nonocclusive coronary artery disease by coronary angiogram in 2018.  This was performed for pre-operative clearance for right shoulder surgery.  She underwent her surgery without difficulty.  Her  other past medical history is as noted above.  She has long-standing history of hypertension and is on a multidrug regimen.  She also has chronic kidney disease.  She is in need of a minor colorectal procedure per patient.  This is an outpatient procedure that require some revision of her previous colostomy however through noninvasive measures.  Cardiac wise she is at baseline with no active symptoms.    Since my last visit she has mainly orthopedic issues.  She had avascular necrosis for which she underwent left hip surgery in May 2020.  She had a uneventful hospital course and tells me that there was no mention of any cardiovascular sequelae during her procedure.  Her  unfortunately has had difficulty with his Parkinson's and has now moved into a nursing home in March 2020.  As a result she has downsized and is now in a townhome to her relief.  She was worried about maintaining a house by herself.  Otherwise is taking all of her medications and is here for her yearly visit.     This past year she was admitted for chest pain and had an evaluation with coronary angiography.  No changes were noted when compared to her prior study in 2018.          Echo 2023  The left ventricle is normal in size.  Left ventricular systolic function is normal. The visual ejection fraction is  60-65%.  Normal left ventricular wall motion  The aortic valve is trileaflet with aortic valve sclerosis. No hemodynamically  significant valvular aortic stenosis.  The right ventricle is normal in structure, function and size.  Compared to prior study, there is no significant change.      Exam:    LMP  (LMP Unknown)   GENERAL: Healthy, alert and no distress  EYES: Eyes grossly normal to inspection.  No discharge or erythema, or obvious scleral/conjunctival abnormalities.  RESP: No audible wheeze, cough, or visible cyanosis.  No visible retractions or increased work of breathing.   CV:  RRR  Lungs: CTA   Abdomen: + BS soft NT  Ext: no edema    SKIN: Visible skin clear. No significant rash, abnormal pigmentation or lesions.  NEURO: Cranial nerves grossly intact.  Mentation and speech appropriate for age.  PSYCH: Mentation appears normal, affect normal/bright, judgement and insight intact, normal speech and appearance well-groomed.

## 2024-01-26 ENCOUNTER — TRANSFERRED RECORDS (OUTPATIENT)
Dept: HEALTH INFORMATION MANAGEMENT | Facility: CLINIC | Age: 79
End: 2024-01-26
Payer: MEDICARE

## 2024-01-30 ENCOUNTER — TELEPHONE (OUTPATIENT)
Dept: UROLOGY | Facility: CLINIC | Age: 79
End: 2024-01-30
Payer: MEDICARE

## 2024-01-30 PROBLEM — N39.46 MIXED INCONTINENCE: Status: ACTIVE | Noted: 2023-08-16

## 2024-01-30 PROBLEM — N32.81 OAB (OVERACTIVE BLADDER): Status: ACTIVE | Noted: 2023-08-16

## 2024-01-30 NOTE — TELEPHONE ENCOUNTER
FUTURE VISIT INFORMATION      SURGERY INFORMATION:  Date: 24  Location: [facility] or  Surgeon:  Mishel Zendejas MD   Anesthesia Type:  choice  Procedure: CYSTOSCOPY, WITH BOTULINUM TOXIN INJECTION     RECORDS REQUESTED FROM:       [facility]    Pertinent Medical History: hypertension, carotid stenosis, insufficiency - arterial peripheral, dvt    Most recent EKG+ Tracin24    Most recent ECHO: 23    Most recent Cardiac Stress Test: 23    Most recent Coronary Angiogram: 23    Most recent Sleep Study:  3/15/23

## 2024-01-30 NOTE — TELEPHONE ENCOUNTER
Patient is scheduled for a surgical procedure with Dr. Zendejas     Spoke with: Patient via phone     Date of Surgery/Procedure: Tuesday February 20, 2024    Location: [facility] OR      Informed patient they will need an adult : Yes     Pre-op: Yes     PAC EVAL: Monday February 12, 2024    Additional imaging/appointments:     Additional comments:      Surgery packet: sent via [employer]     Patient is aware that surgery time is tentative to change and to expect a call 3-1 business days from Pre Admission Nursing for instructions and arrival time

## 2024-02-02 ENCOUNTER — MYC MEDICAL ADVICE (OUTPATIENT)
Dept: ENDOCRINOLOGY | Facility: CLINIC | Age: 79
End: 2024-02-02
Payer: MEDICARE

## 2024-02-05 ENCOUNTER — TRANSFERRED RECORDS (OUTPATIENT)
Dept: HEALTH INFORMATION MANAGEMENT | Facility: CLINIC | Age: 79
End: 2024-02-05
Payer: MEDICARE

## 2024-02-05 ENCOUNTER — PATIENT OUTREACH (OUTPATIENT)
Dept: CARE COORDINATION | Facility: CLINIC | Age: 79
End: 2024-02-05
Payer: MEDICARE

## 2024-02-05 ENCOUNTER — TELEPHONE (OUTPATIENT)
Dept: ENDOCRINOLOGY | Facility: CLINIC | Age: 79
End: 2024-02-05
Payer: MEDICARE

## 2024-02-05 DIAGNOSIS — E11.9 TYPE 2 DIABETES, HBA1C GOAL < 7% (H): Primary | ICD-10-CM

## 2024-02-05 NOTE — TELEPHONE ENCOUNTER
[facility] Call Center    Phone Message    May a detailed message be left on voicemail: yes     Reason for Call: Medication Refill Request    Has the patient contacted the pharmacy for the refill? Yes   Name of medication being requested: test strips  Provider who prescribed the medication: Dr. Weston  Pharmacy:   [facility] [id number] [address]     Date medication is needed: asap, currently out     Action Taken: Message routed to:  [facility] ([facility]):      Travel Screening: Not Applicable

## 2024-02-05 NOTE — TELEPHONE ENCOUNTER
Patient following up on documents, mentioned in below message, requesting a call back from team, please call her at [phone] discuss further. Thank you

## 2024-02-05 NOTE — PROGRESS NOTES
Clinic Care Coordination Contact  [facility]/Voicemail    Clinical Data: Care Coordinator Outreach    Outreach Documentation Number of Outreach Attempt   11/27/2023  11:09 AM 1   2/5/2024  10:52 AM 1     Spoke with patient on 1/16/24 -  1st Attempt    Left message on patient's voicemail with call back information and requested return call.    Plan:  Care Coordinator will try to reach patient again in 10 business days.    HERMANN Hwang  Clinic Care Coordination  [facility]: [place] [place] [place], [place], and [place] [facility]  Phone: [phone]

## 2024-02-06 ENCOUNTER — TELEPHONE (OUTPATIENT)
Dept: ENDOCRINOLOGY | Facility: CLINIC | Age: 79
End: 2024-02-06
Payer: MEDICARE

## 2024-02-06 NOTE — TELEPHONE ENCOUNTER
[facility] Statement of Certifying Physician for Therapeutic Shoes and/or Foot Orthoses for Medicare Patients.    LAST OFFICE/VIRTUAL VISIT:  12/11/23    FUTURE OFFICE/VIRTUAL VISIT:  03/25/24    Lab Results   Component Value Date    A1C 8.4 10/02/2023    A1C 8.7 08/25/2023    A1C 7.9 06/28/2023    A1C 7.1 03/09/2023    A1C 7.0 12/07/2022    A1C 8.7 07/07/2021    A1C 6.2 11/27/2020    A1C 6.8 09/10/2019    A1C 8.8 03/29/2019    A1C 8.1 11/05/2018           Ignacia Grajeda [facility]  [phone]

## 2024-02-06 NOTE — TELEPHONE ENCOUNTER
[facility] Call Center    Phone Message    May a detailed message be left on voicemail: yes     Reason for Call: Form or Letter   Type or form/letter needing completion:  Therapeutic Shoe certificate  Provider: Odette Weston MD   Date form needed: Soon please  Once completed: Fax form to: [phone]    Form was faxed to clinic Jan 25th and Feb 5th    Action Taken: Other: Endo    Travel Screening: Not Applicable

## 2024-02-07 ENCOUNTER — MEDICAL CORRESPONDENCE (OUTPATIENT)
Dept: HEALTH INFORMATION MANAGEMENT | Facility: CLINIC | Age: 79
End: 2024-02-07

## 2024-02-07 DIAGNOSIS — N39.0 URINARY TRACT INFECTION WITHOUT HEMATURIA, SITE UNSPECIFIED: Primary | ICD-10-CM

## 2024-02-07 NOTE — TELEPHONE ENCOUNTER
Forms/paperwork reviewed, completed and signed.  Please fax or send the papers as requested, document in chart and close the encounter.    Thank you.    Nasreen Sherwood MD

## 2024-02-07 NOTE — TELEPHONE ENCOUNTER
It's Yolanda Tapia's patient but I need a MD to sign the form.    Ignacia Grajeda [facility]  [phone]

## 2024-02-07 NOTE — TELEPHONE ENCOUNTER
Form was faxed and sent to scanning.      Ignacia Grajeda, [facility]  Ria/Meron

## 2024-02-07 NOTE — PROGRESS NOTES
Patient notified to have labs done 2/13/24 for her procedure with Dr Zendejas 2/20/24    Purvi Hess, RN, BSN  Care Coordinator Urology  [facility] [place]  [facility]  [phone]

## 2024-02-08 NOTE — PROGRESS NOTES
09/19/23 0500   Appointment Info   Treating Provider Lissa Mead, OTR/L, CLT   Visits Used 1   Medical Diagnosis Lymphedema   OT Tx Diagnosis Lymphedema   Precautions/Limitations R LE foot drop-fall prec, hx of MRSA   Quick Add  Certification   Progress Note/Certification   Start Of Care Date 09/19/23   Onset of Illness/Injury or Date of Surgery 01/06/23  (chronic; has had for years)   Therapy Frequency 3x/week; 1x/week   Predicted Duration up to 90 days   Certification date from 09/19/23   Certification date to 12/17/23   KX Modifier Statement I certify the need for these services furnished under this plan of treatment and while under my care.  (Physician co-signature of this document indicates review and certification of the therapy plan)   Progress Note Due Date   (at 10th visit)   Goals   OT Goals 1;2;3;4   OT Goal 1   Goal Identifier GCB   Goal Description To reduce volume of lymphedema and risk of soft tissue fibrosis, pt will tolerate up to 23hr/day wear gradient compression bandaging (GCB) of B LE.   Target Date 12/17/23   OT Goal 2   Goal Identifier compression   Goal Description Pt will be fitted for new B LE compression garments and will demonstrate independence with donning/doffing and care of garments.   Target Date 12/17/23   OT Goal 3   Goal Identifier education   Goal Description Pt demonstrates awareness of individualized lymphedema precautions based on personal risk factors and when to seek medical attention for assessment of early signs of lymphedema or cellulitis of the affected region.   Target Date 12/17/23   OT Goal 4   Goal Identifier home program   Goal Description Pt will demonstrate understanding of long term management of edema including manual techniques, compression pump if applicable, compression garments, and skin care.   Target Date 12/17/23   Subjective Report   Subjective Report See eval   Treatment Interventions (OT)   Interventions Manual Therapy   Manual Therapy   Manual  Therapy Minutes (86821) 30   Skilled Intervention Edu. on lymphedema basics and POC   Patient Response/Progress Receptive, verbalizing understanding-goals progressing   Manual Therapy 1 - Details Small abrasion type area on L shin with thin layer of skin over top-OT provided non adhesive and rolled gauze, recommend pt continue covering/monitoring and using velcro binder vs compression sock until healed.OT educated pt on types of lymphedema (primary vs secondary) and staging (stage 1, 2, and 3). OT educated on general role of lymphatic system and lymphatic flow throughout the body. Pt instructed in strategies to manage lymphedema and components of CDT including skin care, GCB, compression garments, MLD, and home program development. Pt engaged and asked questions throughout. OT provided pt c handout for increased carry over of learned information. Requested pt bring large/old shoes to next session to accommodate for GCB (will also need to problem solve AFO).   Eval/Assessments   OT Eval, Low Complexity Minutes (33834) 27   Plan   Plan for next session B LE measurements, MLD, GCB B LE lower leg (has AFO R LE-will need to accommodate), follow up on call to [employer] regarding compresison pump   Total Session Time   Timed Code Treatment Minutes 30   Total Treatment Time (sum of timed and untimed services) 57         DISCHARGE  Reason for Discharge: Patient has failed to schedule further appointments.    Equipment Issued: handouts    Discharge Plan: Patient to continue home program.    Referring Provider:  Maude Joaquin *

## 2024-02-12 ENCOUNTER — PRE VISIT (OUTPATIENT)
Dept: SURGERY | Facility: CLINIC | Age: 79
End: 2024-02-12

## 2024-02-12 ENCOUNTER — ANESTHESIA EVENT (OUTPATIENT)
Dept: SURGERY | Facility: AMBULATORY SURGERY CENTER | Age: 79
End: 2024-02-12
Payer: MEDICARE

## 2024-02-12 ENCOUNTER — VIRTUAL VISIT (OUTPATIENT)
Dept: SURGERY | Facility: CLINIC | Age: 79
End: 2024-02-12
Payer: MEDICARE

## 2024-02-12 DIAGNOSIS — E11.9 TYPE 2 DIABETES, HBA1C GOAL < 7% (H): ICD-10-CM

## 2024-02-12 DIAGNOSIS — Z01.818 PRE-OP EVALUATION: Primary | ICD-10-CM

## 2024-02-12 PROCEDURE — 99214 OFFICE O/P EST MOD 30 MIN: CPT | Mod: 95 | Performed by: PHYSICIAN ASSISTANT

## 2024-02-12 RX ORDER — OMEGA-3/DHA/EPA/FISH OIL 60 MG-90MG
CAPSULE ORAL EVERY EVENING
COMMUNITY
End: 2024-04-15

## 2024-02-12 ASSESSMENT — ENCOUNTER SYMPTOMS: SEIZURES: 0

## 2024-02-12 ASSESSMENT — LIFESTYLE VARIABLES: TOBACCO_USE: 0

## 2024-02-12 NOTE — H&P
Pre-Operative H & P     CC:  Preoperative exam to assess for increased cardiopulmonary risk while undergoing surgery and anesthesia.    Date of Encounter: 2/12/2024  Primary Care Physician:  Shola Benoit     Reason for visit:   Encounter Diagnoses   Name Primary?    Pre-op evaluation Yes    Type 2 diabetes, HbA1c goal < 7% (H)        HPI  Chasity Hodgson is a 78 year old female who presents for pre-operative H & P in preparation for  Procedure Information       Case: [id number] Date/Time: 02/20/24 1100    Procedure: CYSTOSCOPY, WITH BOTULINUM TOXIN INJECTION (Bladder)    Anesthesia type: Choice    Diagnosis:       OAB (overactive bladder) [N32.81]      Mixed incontinence [N39.46]    Pre-op diagnosis:       OAB (overactive bladder) [N32.81]      Mixed incontinence [N39.46]    Location: [place] [address] / [facility]    Providers: Mishel Zendejas MD            Patient is being evaluated for comorbid conditions of CAD, CRI, allergic rhinitis, diabetes, h/o DVT, GERD, h/o blood transfusion, ANNETTE, h/o thyroid cancer, h/o PE, diabetic gastroparesis, obesity    Ms. Hodgson has a history of OAB. She is s/p cystoscopy with botox injection 9/2023. She was recently seen by urology and repeat cystoscopy with botox injection is now scheduled as above.       History is obtained from the patient and chart review    Hx of abnormal bleeding or anti-platelet use: ASA    Menstrual history: No LMP recorded (lmp unknown). Patient has had a hysterectomy.     Past Medical History  Past Medical History:   Diagnosis Date    Abdominal adhesions 1984, 96,99    s/p lysis    Abdominal adhesions     Acne rosacea     Allergic rhinitis     Allergic rhinitis, cause unspecified     Alopecia     Anemia     CAD (coronary artery disease)     Carotid stenosis     CKD (chronic kidney disease) stage 2, GFR 60-89 ml/min     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)     CPAP (continuous positive airway pressure)  dependence     Depression     Diabetic gastroparesis (H)     Diet-controlled type 2 diabetes mellitus (H)     DM2 (diabetes mellitus, type 2) (H)     DVT (deep venous thrombosis) (H)     DVT of axillary vein, acute right (H)     Fibromyalgia     Gastro-oesophageal reflux disease     GERD (gastroesophageal reflux disease)     Hernia of unspecified site of abdominal cavity without mention of obstruction or gangrene     bilateral    Hernia, abdominal     History of blood transfusion     10/ 1980    History of thrombophlebitis     HTN (hypertension)     HTN (hypertension)     Hypertriglyceridemia     Hypertriglyceridemia     Hypokalemia     Hypothyroidism     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    Mumps     Obstructive sleep apnea     ANNETTE (obstructive sleep apnea)     ANNETTE on CPAP     Osteoarthritis of glenohumeral joint     Papillary carcinoma of thyroid (H)     s/p thyroidectomy - Ruegemer    Papillary carcinoma of thyroid (H)     PE (pulmonary embolism)     Poliomyelitis     poor circulation right leg    Poliomyelitis     Postsurgical hypothyroidism     s/p papillary thryoid cancer - Ruegemer    Pulmonary embolism (H)     Pulmonary embolus (H)     Pulmonary nodule     Rosacea     S/P carpal tunnel release     bilateral    S/P hardware removal 01/2014    still with lingering foot pain    S/P shoulder surgery     bilateral    Septic joint (H)     right knee    Septic joint of right knee joint (H)     Venous insufficiency     Venous insufficiency     Venous thrombosis 1999    right axillary vein       Past Surgical History  Past Surgical History:   Procedure Laterality Date    AMPUTATE TOE(S)  03/15/2012    Procedure:AMPUTATE TOE(S); Surgeon:RUBEN MOSES; Location:[facility] OR    AMPUTATE TOE(S)      APPENDECTOMY  1972    APPENDECTOMY      ARTHRODESIS FOOT  03/15/2012    Procedure:ARTHRODESIS FOOT; RIGHT TRIPLE ARTHRODESIS, FIFTH TOE AMPUTATION, LATERAL LIGAMENT RECONSTRUCTION, TENDON TRANSFER AND RELEASE [MINI  C-ARM, ARTHREX 4.5 AND 6.7 STAPLES, BIOCOMPOSITE TENODESIS SCREWS]; Surgeon:RUBEN MOSES; Location: OR    ARTHRODESIS FOOT Right     Right foot triple arthrodesis and removal of hardware    ARTHROSCOPY SHOULDER  06/25/2015    REVISION SUBACROMIAL DECOMPRESSION, EXCISION OF GANGLION CYST, DEBRIDEMENT AND EXCISION OF THE GLENOHUMERAL JOINT GANGLION CYST, CORACOID DECOMPRESSION, POSSIBLE SUBSCAPULARIS REPAIR AND OPEN SUBSCAPULARIS BICEP    ARTHROSCOPY SHOULDER ROTATOR CUFF REPAIR      BIOPSY  Thyroid 2002    BLADDER SURGERY      BOTOX INJECTION MEDICAL      BREAST SURGERY  Biopsy    CHOLECYSTECTOMY      CHOLECYSTECTOMY      COLONOSCOPY  2018    COLOSTOMY  02/07/2012    Procedure:COLOSTOMY; CREATION OF SIGMOID COLOSTOMY AND EXTENSIVE  LYSIS OF ADHESIONS; Surgeon:MONTSERRAT BENDER; Location: OR    COLOSTOMY      CV ANGIOGRAM RENAL BILATERAL Bilateral 11/17/2023    Procedure: Angiogram Renal Bilateral;  Surgeon: Ankit Bhatia MD;  Location:  [facility]    CV CORONARY ANGIOGRAM N/A 11/17/2023    Procedure: Coronary Angiogram;  Surgeon: Ankit Bhatia MD;  Location:  [facility]    CYSTOSCOPY      CYSTOSCOPY, INJECT BOTOX N/A 09/18/2023    Procedure: CYSTOSCOPY, WITH BOTULINUM TOXIN INJECTION;  Surgeon: Mishel Zendejas MD;  Location: [facility] OR    EYE SURGERY      GENITOURINARY SURGERY  1999    GI SURGERY      weakened rectal sphincter with artificial stimulator    HERNIA REPAIR  1976    HYSTERECTOMY TOTAL ABDOMINAL      LAPAROTOMY, LYSIS ADHESIONS, COMBINED  02/07/2012    Procedure:COMBINED LAPAROTOMY, LYSIS ADHESIONS; Surgeon:MONTSERRAT BENDER; Location: OR    RELEASE CARPAL TUNNEL      RELEASE TENDON FOOT  03/15/2012    Procedure:RELEASE TENDON FOOT; Surgeon:RUBEN MOSES; Location: OR    REMOVE HARDWARE FOOT  12/13/2012    Procedure: REMOVE HARDWARE FOOT;  RIGHT FOOT REMOVAL OF HARDWARE;  Surgeon: Ruben Moses MD;  Location:  [facility] OR    SHOULDER SURGERY  11/12/2020    LEFT SHOULDER HEMIARHTROPLASTY, BICEP TENODESIS    SOFT TISSUE SURGERY  2018, 2020    TENDON RELEASE      foot    THYROIDECTOMY      ZZC FREEING BOWEL ADHESION,ENTEROLYSIS      1986, 1996, 1999    ZZC NONSPECIFIC PROCEDURE      throidectomy    ZZC TOTAL ABDOM HYSTERECTOMY  1980    + BSO       Prior to Admission Medications  Current Outpatient Medications   Medication Sig Dispense Refill    acetaminophen (TYLENOL) 500 MG tablet Take 1,000 mg by mouth every 8 hours as needed      amLODIPine (NORVASC) 2.5 MG tablet Take 1 tablet (2.5 mg) by mouth daily (Patient taking differently: Take 2.5 mg by mouth every evening) 90 tablet 1    aspirin (ASA) 81 MG EC tablet Take 81 mg by mouth daily (with lunch)      azelastine (ASTEPRO) 0.15 % nasal spray Spray 1 spray into both nostrils daily as needed      BIOTIN PO Take 1 capsule by mouth at bedtime Unknown dose      Calcium Carbonate-Vitamin D (CALTRATE 600+D PO) Take 1 tablet by mouth every evening Takes in the afternoon      carvedilol (COREG) 12.5 MG tablet Take 1 tablet (12.5 mg) by mouth 2 times daily (with meals) 180 tablet 3    Cyanocobalamin (B-12 PO) Take 1 tablet by mouth every evening Unknown dose. Takes in the afternoon      empagliflozin (JARDIANCE) 10 MG TABS tablet Take 1 tablet (10 mg) by mouth daily (Patient taking differently: Take 10 mg by mouth every evening) 90 tablet 1    ezetimibe (ZETIA) 10 MG tablet Take 1 tablet (10 mg) by mouth daily (Patient taking differently: Take 10 mg by mouth every evening) 90 tablet 3    famotidine (PEPCID) 20 MG tablet Take 2 tablets (40 mg) by mouth daily (Patient taking differently: Take 40 mg by mouth every evening) 180 tablet 3    fenofibrate (TRICOR) 145 MG tablet Take 1 tablet (145 mg) by mouth daily (Patient taking differently: Take 145 mg by mouth every morning) 90 tablet 3    fexofenadine (ALLEGRA) 180 MG tablet Take 180 mg by mouth daily (with lunch) Takes in the afternoon 120  0    fish oil-omega-3 fatty acids 500 MG capsule Take by mouth every evening      fluticasone (FLONASE) 50 MCG/ACT spray Spray 2 sprays in nostril daily 2 sprays in each nostril qd (Patient taking differently: Spray 2 sprays in nostril every morning 2 sprays in each nostril qd) 1 Bottle 0    furosemide (LASIX) 20 MG tablet Take 1 tablet (20 mg) by mouth daily as needed (lower extremity edema) 90 tablet 3    icosapent ethyl (VASCEPA) 1 g CAPS capsule Take 1 g by mouth every morning      insulin aspart (NOVOLOG FLEXPEN) 100 UNIT/ML pen Inject 10 Units Subcutaneous 2 times daily (with meals) Breakfast and supper, pt eats minimal at lunch 15 mL 3    insulin glargine (LANTUS PEN) 100 UNIT/ML pen Inject 30 Units Subcutaneous every morning If BG<100, take 50% of usual dose (15 unit(s) ) 30 mL 3    levothyroxine (SYNTHROID/LEVOTHROID) 112 MCG tablet Take 1 tablet (112 mcg) by mouth daily (Patient taking differently: Take 112 mcg by mouth every morning) 90 tablet 3    minoxidil (LONITEN) 2.5 MG tablet Take half tablet (1.25 mg) by mouth once daily (Patient taking differently: Take 1.25 mg by mouth every morning Take half tablet (1.25 mg) by mouth once daily) 90 tablet 2    MULTIPLE VITAMIN PO Take 1 tablet by mouth daily (with lunch)      nitroGLYcerin (NITROSTAT) 0.4 MG sublingual tablet Place 1 tablet (0.4 mg) under the tongue every 5 minutes as needed for chest pain (no more than 3 in one hour; after 3rd, call 911.) 25 tablet 3    olmesartan (BENICAR) 40 MG tablet Take 1 tablet (40 mg) by mouth daily (Patient taking differently: Take 40 mg by mouth every morning) 90 tablet 1    ondansetron (ZOFRAN) 4 MG tablet Take 1 tablet (4 mg) by mouth every 6 hours as needed Reported on 3/20/2017 20 tablet 0    pantoprazole (PROTONIX) 40 MG EC tablet Take 40 mg by mouth 2 times daily      sucralfate (CARAFATE) 1 GM/10ML suspension Take 1 g by mouth 4 times daily as needed (GERD)      Vitamin D3 50 mcg (2000 units) tablet Take 1  tablet by mouth every evening Takes in the afternoon      acetaminophen-caffeine (EXCEDRIN TENSION HEADACHE) 500-65 MG TABS Take 2 tablets by mouth every 6 hours as needed for mild pain (Patient not taking: Reported on 2/12/2024) 90 tablet 0    amoxicillin (AMOXIL) 500 MG capsule Takes 4 caps before every dental appointments.      blood glucose (NO BRAND SPECIFIED) test strip Use to test blood sugar 1 times daily or as directed. 100 strip 1    clotrimazole (LOTRIMIN) 1 % cream Apply topically as needed (rash/yeast infection)      Continuous Blood Gluc Sensor (FREESTYLE BRANDY 3 SENSOR) MISC 1 each every 14 days 6 each 3    fluocinolone acetonide (DERMA SMOOTHE/FS BODY) 0.01 % external oil Apply 2 mL to scalp once per week. Massage into scalp. Can be left in overnight or washed out after 4-6 hours. 118.28 mL 5    insulin pen needle (B-D U/F) 31G X 5 MM miscellaneous Use 1 pen needles daily or as directed. 90 each 3    Lidocaine (LIDOCARE) 4 % Patch Place 2-3 patches onto the skin every 24 hours To prevent lidocaine toxicity, patient should be patch free for 12 hrs daily.      nystatin (MYCOSTATIN) cream Apply topically daily as needed (groin)      nystatin-triamcinolone (MYCOLOG II) cream Apply topically daily as needed (genital itching)      order for DME Equipment being ordered: Compression stockings - Knee High; 20-30 mmHg compression - note would like adhesive band to keep the stocking from sliding down 3 each 0    Polyethylene Glycol 400 (BLINK TEARS) 0.25 % GEL Apply to eye At Bedtime      polyethylene glycol 400 (BLINK TEARS) 0.25 % SOLN ophthalmic solution Place 1 drop into both eyes daily      tretinoin (RETIN-A) 0.025 % external cream Apply topically nightly as needed (facial lesions) Use every night as tolerated - spot treat lesion      triamcinolone (KENALOG) 0.1 % external ointment Apply topically every other day 80 g 3       Allergies  Allergies   Allergen Reactions    Ketorolac Tromethamine Difficulty  breathing     Shortness of breath to tablets only per the patient    Nsaids Difficulty breathing     Increased creatinine    Celecoxib Itching and Rash    Morphine And Related Itching     With higher doses. Pt denies this allergy 11/16/2023    Codeine Itching    Crestor [Rosuvastatin] Muscle Pain (Myalgia)    No Clinical Screening - See Comments Itching     Fragrance    Vioxx Other (See Comments)     Heart races    Conjugated Estrogens Rash    Sulfa Antibiotics Rash       Social History  Social History     Socioeconomic History    Marital status:      Spouse name: Not on file    Number of children: 2    Years of education: Not on file    Highest education level: Not on file   Occupational History    Not on file   Tobacco Use    Smoking status: Never     Passive exposure: Never    Smokeless tobacco: Never   Vaping Use    Vaping Use: Never used   Substance and Sexual Activity    Alcohol use: Never    Drug use: Never    Sexual activity: Not Currently     Partners: Male     Birth control/protection: Female Surgical   Other Topics Concern    Parent/sibling w/ CABG, MI or angioplasty before 65F 55M? Not Asked     Service Not Asked    Blood Transfusions Not Asked    Caffeine Concern Yes     Comment: occ    Occupational Exposure Not Asked    Hobby Hazards Not Asked    Sleep Concern Yes    Stress Concern Yes    Weight Concern Not Asked    Special Diet Yes     Comment: low carb, low sugar     Back Care Not Asked    Exercise No     Comment: occ. stationary bike     Bike Helmet Not Asked    Seat Belt Yes    Self-Exams Not Asked   Social History Narrative    , 2 children    Retired in medical records at [employer]      Social Determinants of Health     Financial Resource Strain: Low Risk  (11/20/2023)    Financial Resource Strain     Within the past 12 months, have you or your family members you live with been unable to get utilities (heat, electricity) when it was really needed?: No   Food  Insecurity: Low Risk  (11/20/2023)    Food Insecurity     Within the past 12 months, did you worry that your food would run out before you got money to buy more?: No     Within the past 12 months, did the food you bought just not last and you didn t have money to get more?: No   Transportation Needs: High Risk (11/20/2023)    Transportation Needs     Within the past 12 months, has lack of transportation kept you from medical appointments, getting your medicines, non-medical meetings or appointments, work, or from getting things that you need?: Yes   Physical Activity: Not on file   Stress: Not on file   Social Connections: Not on file   Interpersonal Safety: Low Risk  (11/21/2023)    Interpersonal Safety     Do you feel physically and emotionally safe where you currently live?: Yes     Within the past 12 months, have you been hit, slapped, kicked or otherwise physically hurt by someone?: No     Within the past 12 months, have you been humiliated or emotionally abused in other ways by your partner or ex-partner?: No   Housing Stability: Low Risk  (11/20/2023)    Housing Stability     Do you have housing? : Yes     Are you worried about losing your housing?: No       Family History  Family History   Problem Relation Age of Onset    Arthritis Mother     Hypertension Mother     Cerebrovascular Disease Mother     Obesity Mother     Heart Disease Mother         MI's    Hypertension Father     Respiratory Father         Adult RDS    Diabetes Father     Arthritis Sister     Cancer Sister     Diabetes Sister     Hypertension Sister     Breast Cancer Sister     Depression Sister     Thyroid Disease Sister     Obesity Sister     Arthritis Sister     Hypertension Sister     Thyroid Disease Sister     Osteoporosis Sister     Obesity Sister     Cancer Sister         colon polup    Hypertension Sister     Osteoporosis Sister     Obesity Sister     Colon Cancer Sister     Lipids Sister     Obesity Sister     Heart Disease Brother          MI at 54    Other Cancer Brother         Lung & bone    Lipids Brother     Hypertension Brother     Diabetes Brother     Hyperlipidemia Brother     Obesity Maternal Grandmother         Dad s mother    Skin Cancer Maternal Grandmother         skin cancer unknown    Cancer Maternal Grandmother         unknown skin cancer on face    Obesity Paternal Grandmother         Mothers mother    Ovarian Cancer No family hx of     Anesthesia Reaction No family hx of     Deep Vein Thrombosis (DVT) No family hx of        Review of Systems  The complete review of systems is negative other than noted in the HPI or here.   Anesthesia Evaluation   Pt has had prior anesthetic.         ROS/MED HX  ENT/Pulmonary:     (+) sleep apnea, mild, doesn't use CPAP,         allergic rhinitis,                          (-) tobacco use   Neurologic: Comment: Right leg paralysis due to polio history    (-) no seizures and no CVA   Cardiovascular:     (+)  hypertension- -  CAD -  - -   Taking blood thinners                              Previous cardiac testing   Echo: Date: 11/2023 Results:  Interpretation Summary     The left ventricle is normal in size.  Left ventricular systolic function is normal. The visual ejection fraction is  60-65%.  Normal left ventricular wall motion  The aortic valve is trileaflet with aortic valve sclerosis. No hemodynamically  significant valvular aortic stenosis.  The right ventricle is normal in structure, function and size.  Compared to prior study, there is no significant change.    Stress Test:  Date: Results:    ECG Reviewed:  Date: Results:    Cath:  Date: 11/2023 Results:  1.  No obstructive coronary artery disease on coronary angiography.  Study essentially unchanged from 2018.  2.  No high-grade proximal or ostial stenosis on bilateral renal angiogram.      METS/Exercise Tolerance: 3 - Able to walk 1-2 blocks without stopping Comment: Some limitations due to right leg paralysis. Uses walker. Some  walking, seated exercises. Cleaning around the house    Hematologic:     (+) History of blood clots,    pt is not anticoagulated,  history of blood transfusion, no previous transfusion reaction,        Musculoskeletal:  - neg musculoskeletal ROS     GI/Hepatic: Comment: Colostomy present     (+) GERD, Asymptomatic on medication,                  Renal/Genitourinary:     (+) renal disease, type: CRI,            Endo:     (+)  type II DM,   Using insulin, - not using insulin pump. Normal glucose range: 150s,   thyroid problem, hypothyroidism,    Obesity (BMI 40),       Psychiatric/Substance Use:  - neg psychiatric ROS     Infectious Disease:  - neg infectious disease ROS     Malignancy:   (+) Malignancy, History of Other.Other CA thyroid Remission status post Surgery.    Other:            Virtual visit -  No vitals were obtained    Physical Exam  Constitutional: Awake, alert, cooperative, no apparent distress, and appears stated age.  HENT: Normocephalic  Respiratory: non labored breathing   Neurologic: Awake, alert, oriented to name, place and time.   Neuropsychiatric: Calm, cooperative. Normal affect.      Prior Labs/Diagnostic Studies   All labs and imaging personally reviewed     EKG/ stress test - if available please see in ROS above   Echo result w/o MOPS: Interpretation Summary The left ventricle is normal in size.Left ventricular systolic function is normal. The visual ejection fraction is60-65%.Normal left ventricular wall motionThe aortic valve is trileaflet with aortic valve sclerosis. No hemodynamicallysignificant valvular aortic stenosis.The right ventricle is normal in structure, function and size.Compared to prior study, there is no significant change.           No data to display                  The patient's records and results personally reviewed by this provider.     Outside records reviewed from: Care Everywhere      Assessment    Chasity Hodgson is a 78 year old female seen as a PAC referral for  risk assessment and optimization for anesthesia.    Plan/Recommendations  Pt will be optimized for the proposed procedure.  See below for details on the assessment, risk, and preoperative recommendations    NEUROLOGY  - No history of TIA, CVA or seizure  -Post Op delirium risk factors:  No risk identified    ENT  - No current airway concerns.  Will need to be reassessed day of surgery.  Mallampati: Unable to assess  TM: Unable to assess    CARDIAC  -h/o NSTEMI.  Completed angiogram 11/2023 showed nonocclusive CAD, unchanged from 2018. Symptoms stable.   -dyslipidemia using fenofibrate and zetia  -follows with cardiology. Last seen 1/2024. Stable. Plan to follow up in 1 year with pre-clinic carotid ultrasound  - METS (Metabolic Equivalents)  Patient CANNOT perform 4 METS exercise without symptoms            Total Score: 1    Functional Capacity: Unable to complete 4 METS      RCRI-Moderate risk: Class 3  6.6% complication rate            Total Score: 2    RCRI: CAD    RCRI: Diabetes        PULMONARY  - Obstructive Sleep Apnea, mild. No CPAP use. Reports she did not qualify for inspire due to mild symptoms   - Denies asthma or inhaler use  - Tobacco History    History   Smoking Status    Never   Smokeless Tobacco    Never       GI  -colostomy present  PONV High Risk  Total Score: 3           1 AN PONV: Pt is Female    1 AN PONV: Patient is not a current smoker    1 AN PONV: Intended Post Op Opioids        /RENAL  -CRI. Baseline 1.08-1.38. Last Cr 1.15 on 1/22/24  -OAB. Above procedure planned     ENDOCRINE    - BMI: Estimated body mass index is 40.62 kg/m  as calculated from the following:    Height as of 1/24/24: 1.524 m (5').    Weight as of 1/24/24: 94.3 kg (208 lb).  Class 3 Obesity (BMI > 40)  - Diabetes  Hemoglobin A1C (%)   Date Value   10/02/2023 8.4 (H)   07/07/2021 8.7 (H)   - will update A1c tomorrow . She has routine follow up with her endocrinologist next month   Diabetes Type 2, insulin dependent. Hold  morning oral hypoglycemic medications and short acting insulin DOS. Take 80% of last scheduled dose of long-acting insulin prior to procedure.  Recommend close monitoring of the patient's blood glucose levels throughout the perioperative period.  - Thyroid disorder  Continue home replacement while hospitalized.    HEME  VTE Low Risk 0.5%            Total Score: 4    VTE: Greater than 59 yrs old    VTE: Pt history of VTE      - Platelet disfunction second to Aspirin (Gutierrez, many others)    ACCESS  -h/o difficult IV access     Different anesthesia methods/types have been discussed with the patient, but they are aware that the final plan will be decided by the assigned anesthesia provider on the date of service.    The patient is optimized for their procedure. AVS with information on surgery time/arrival time, meds and NPO status given by nursing staff. No further diagnostic testing indicated.    Please refer to the physical examination documented by the anesthesiologist in the anesthesia record on the day of surgery.    Video-Visit Details    Type of service:  Video Visit    Provider received verbal consent for a Video Visit from the patient? Yes     Originating Location (pt. Location): Home    Distant Location (provider location):  Off-site  Mode of Communication:  Video Conference via [employer]  On the day of service:     Prep time: 14 minutes  Visit time: 11 minutes  Documentation time: 8 minutes  ------------------------------------------  Total time: 33 minutes      Pippa Hoover PA-C  Preoperative Assessment Center  [facility]  Phone: [phone]  Fax: [phone]

## 2024-02-12 NOTE — PROGRESS NOTES
Sammie is a 78 year old who is being evaluated via a billable video visit.      How would you like to obtain your AVS? Carlos Alberto Maciel   Sammie is a 78 year old, presenting for the following health issues:  Pre-Op Exam (/)            PIA Ag LPN

## 2024-02-12 NOTE — PATIENT INSTRUCTIONS
Preparing for Your Surgery      Name:  Chasity Hodgson   MRN:  [id number]   :  1945   Today's Date:  2024         Arriving for surgery:  Surgery date:  24  Arrival time:  9:30 am  Surgery time: 11:00 am    Restrictions due to COVID 19:    Please maintain social distance.  Masking is optional        parking is available for anyone with mobility limitations or disabilities. (Monday- Friday 7 am- 5 pm)    Please come to:    [facility]  [address] [place] [id number]    Check in on the 5th floor, Ambulatory Surgery Center.    What can I eat or drink?    -  You may eat and drink normally until 8 hours before arrival time  (Until 1:30 am on 24)  -  You may have clear liquids until 2 hours before arrival time  (Until 7:30 am on 24)    Examples of clear liquids:  Water  Clear broth  Juices (apple, white grape, white cranberry  and cider) without pulp  Noncarbonated, powder based beverages  (lemonade and Manny-Aid)  Sodas (Sprite, 7-Up, ginger ale and seltzer)  Coffee or tea (without milk or cream)  Gatorade    --No alcohol or cannabis products for at least 24 hours before surgery    Which medicines can I take?    Hold Aspirin for 7 days before surgery.   Hold Multivitamins for 7 days before surgery.  Hold Supplements for 7 days before surgery. Fish oil    Hold Ibuprofen (Advil, Motrin) for 1 day before surgery--unless otherwise directed by surgeon.  Hold Naproxen (Aleve) for 4 days before surgery.    Hold Empagliflozin (Jardiance) for 3 days before surgery. Take last dose 24.    -  DO NOT take the following medications the day of surgery:   Biotin    No creams   Fenofibrate (Tricor)   Furosemide (Lasix)   Aspart insulin- short acting   Olmesartan (Benicar)   Sucralfate    Vitamin D          -  PLEASE TAKE the following medications the day of surgery    Acetaminophen (Tylenol) as needed   Nasal sprays as needed   Carvedilol (Coreg)   Levothyroxine  (Synthroid)   Take 80 % of normal Lantus insulin dose in the morning. 80% of 30 units = 24 units. 80% of 15 units = 12 units.   Minoxidil    Ondansetron (zofran) as needed   Pantoprazole (protonix)           How do I prepare myself?  - Please take 2 showers (one the night prior to surgery and one the morning of surgery) using Scrubcare or Hibiclens soap.    Use this soap only from the neck to your toes.     Leave the soap on your skin for one minute--then rinse thoroughly.      You may use your own shampoo and conditioner; no other hair products.   - Please remove all jewelry and body piercings.  - No lotions, deodorants or fragrance.  - No makeup or fingernail polish.   - Bring your ID and insurance card.    -If you have a Deep Brain Stimulator, a Spinal Cord Stimulator or any implanted Neuro device you must bring the remote to the Surgery Center          ALL PATIENTS ARE REQUIRED TO HAVE A RESPONSIBLE ADULT TO DRIVE AND BE IN ATTENDANCE WITH THEM FOR 24 HOURS FOLLOWING SURGERY       Covid testing policy as of 12/06/2022  Your surgeon will notify and schedule you for a COVID test if one is needed before surgery--please direct any questions or COVID symptoms to your surgeon      Questions or Concerns:    -For questions regarding the day of surgery please contact the Ambulatory Surgery Center at [phone].    -If you have health changes between today and your surgery please contact your surgeon.     For questions after surgery please call your surgeons office.

## 2024-02-13 ENCOUNTER — LAB (OUTPATIENT)
Dept: LAB | Facility: CLINIC | Age: 79
End: 2024-02-13
Payer: MEDICARE

## 2024-02-13 DIAGNOSIS — N39.0 URINARY TRACT INFECTION WITHOUT HEMATURIA, SITE UNSPECIFIED: ICD-10-CM

## 2024-02-13 DIAGNOSIS — N39.42 URINARY INCONTINENCE WITHOUT SENSORY AWARENESS: ICD-10-CM

## 2024-02-13 DIAGNOSIS — Z01.818 PRE-OP EVALUATION: ICD-10-CM

## 2024-02-13 DIAGNOSIS — E11.9 TYPE 2 DIABETES, HBA1C GOAL < 7% (H): ICD-10-CM

## 2024-02-13 LAB
ALBUMIN UR-MCNC: NEGATIVE MG/DL
APPEARANCE UR: CLEAR
BACTERIA #/AREA URNS HPF: ABNORMAL /HPF
BILIRUB UR QL STRIP: NEGATIVE
COLOR UR AUTO: YELLOW
GLUCOSE UR STRIP-MCNC: 500 MG/DL
HBA1C MFR BLD: 6.8 % (ref 0–5.6)
HGB UR QL STRIP: NEGATIVE
KETONES UR STRIP-MCNC: NEGATIVE MG/DL
LEUKOCYTE ESTERASE UR QL STRIP: NEGATIVE
NITRATE UR QL: NEGATIVE
PH UR STRIP: 5.5 [PH] (ref 5–7)
RBC #/AREA URNS AUTO: ABNORMAL /HPF
SP GR UR STRIP: 1.02 (ref 1–1.03)
SQUAMOUS #/AREA URNS AUTO: ABNORMAL /LPF
UROBILINOGEN UR STRIP-ACNC: 0.2 E.U./DL
WBC #/AREA URNS AUTO: ABNORMAL /HPF

## 2024-02-13 PROCEDURE — 81001 URINALYSIS AUTO W/SCOPE: CPT

## 2024-02-13 PROCEDURE — 83036 HEMOGLOBIN GLYCOSYLATED A1C: CPT

## 2024-02-13 PROCEDURE — 36415 COLL VENOUS BLD VENIPUNCTURE: CPT

## 2024-02-13 PROCEDURE — 87186 SC STD MICRODIL/AGAR DIL: CPT

## 2024-02-13 PROCEDURE — 87088 URINE BACTERIA CULTURE: CPT

## 2024-02-13 PROCEDURE — 87086 URINE CULTURE/COLONY COUNT: CPT

## 2024-02-16 ENCOUNTER — OFFICE VISIT (OUTPATIENT)
Dept: FAMILY MEDICINE | Facility: CLINIC | Age: 79
End: 2024-02-16
Payer: MEDICARE

## 2024-02-16 VITALS
BODY MASS INDEX: 39.6 KG/M2 | WEIGHT: 201.7 LBS | SYSTOLIC BLOOD PRESSURE: 138 MMHG | RESPIRATION RATE: 18 BRPM | OXYGEN SATURATION: 100 % | TEMPERATURE: 96.6 F | HEIGHT: 60 IN | DIASTOLIC BLOOD PRESSURE: 72 MMHG | HEART RATE: 64 BPM

## 2024-02-16 DIAGNOSIS — Z93.3 COLOSTOMY IN PLACE (H): ICD-10-CM

## 2024-02-16 DIAGNOSIS — E66.01 MORBID OBESITY WITH BMI OF 40.0-44.9, ADULT (H): ICD-10-CM

## 2024-02-16 DIAGNOSIS — N18.30 STAGE 3 CHRONIC KIDNEY DISEASE, UNSPECIFIED WHETHER STAGE 3A OR 3B CKD (H): ICD-10-CM

## 2024-02-16 DIAGNOSIS — M87.052 AVASCULAR NECROSIS OF BONE OF HIP, LEFT (H): ICD-10-CM

## 2024-02-16 DIAGNOSIS — M79.2 NEUROPATHIC PAIN: ICD-10-CM

## 2024-02-16 DIAGNOSIS — N18.32 CHRONIC KIDNEY DISEASE, STAGE 3B (H): ICD-10-CM

## 2024-02-16 DIAGNOSIS — Z78.0 POST-MENOPAUSAL: ICD-10-CM

## 2024-02-16 DIAGNOSIS — I10 BENIGN ESSENTIAL HYPERTENSION: ICD-10-CM

## 2024-02-16 DIAGNOSIS — C73 PAPILLARY CARCINOMA OF THYROID (H): ICD-10-CM

## 2024-02-16 DIAGNOSIS — Z12.31 ENCOUNTER FOR SCREENING MAMMOGRAM FOR BREAST CANCER: ICD-10-CM

## 2024-02-16 DIAGNOSIS — G14 POST-POLIO SYNDROME (H): ICD-10-CM

## 2024-02-16 DIAGNOSIS — E11.69 TYPE 2 DIABETES MELLITUS WITH OTHER SPECIFIED COMPLICATION, WITHOUT LONG-TERM CURRENT USE OF INSULIN (H): Primary | ICD-10-CM

## 2024-02-16 DIAGNOSIS — F32.0 MILD MAJOR DEPRESSION (H): ICD-10-CM

## 2024-02-16 DIAGNOSIS — N39.0 URINARY TRACT INFECTION WITHOUT HEMATURIA, SITE UNSPECIFIED: Primary | ICD-10-CM

## 2024-02-16 LAB
BACTERIA UR CULT: ABNORMAL
BACTERIA UR CULT: ABNORMAL

## 2024-02-16 PROCEDURE — 99214 OFFICE O/P EST MOD 30 MIN: CPT | Performed by: INTERNAL MEDICINE

## 2024-02-16 RX ORDER — AMLODIPINE BESYLATE 2.5 MG/1
2.5 TABLET ORAL EVERY EVENING
Qty: 90 TABLET | Refills: 3 | Status: SHIPPED | OUTPATIENT
Start: 2024-02-16 | End: 2024-07-29

## 2024-02-16 RX ORDER — CEPHALEXIN 500 MG/1
500 CAPSULE ORAL 2 TIMES DAILY
Qty: 14 CAPSULE | Refills: 0 | Status: SHIPPED | OUTPATIENT
Start: 2024-02-16 | End: 2024-03-13

## 2024-02-16 RX ORDER — OLMESARTAN MEDOXOMIL 40 MG/1
40 TABLET ORAL DAILY
COMMUNITY
Start: 2024-02-16 | End: 2024-05-01

## 2024-02-16 ASSESSMENT — PAIN SCALES - GENERAL: PAINLEVEL: SEVERE PAIN (7)

## 2024-02-16 NOTE — PROGRESS NOTES
Raheem Cochran is a 78 year old, presenting for the following health issues:  Hypertension, Edema, Orders (For Bone Density test and Mammogram ), and Throat Problem (Sore throat )    History of Present Illness       Hypertension: She presents for follow up of hypertension.  She does check blood pressure  regularly outside of the clinic. Outside blood pressures have been over 140/90. She follows a low salt diet.     Reason for visit:  Leg swelling, blood pressure, neck swelling affecting swallowing, lymph node more swollen, stomach pains, nausea, not feeling well. Not able to walk much do to issues with right thigh and leg. Wanting to loose more weight.    She eats 2-3 servings of fruits and vegetables daily.She consumes 0 sweetened beverage(s) daily.She exercises with enough effort to increase her heart rate 9 or less minutes per day.  She exercises with enough effort to increase her heart rate 4 days per week.   She is taking medications regularly.     Neuropathic pain   Has been experiencing worsening pain in her left shoulder that is described as a severe aching triggered by shfiting her shoulder and moving in certain ways.  Specifically aggravated by reaching.  She has had success with pain management in the past with use of Lyrica.  Has also seen orthopedics and not candidate for any surgery..   Hypertension   Blood pressure has been well controlled with carevedilol twice per day, olmesartan at current dose of 40 mg AM, minoxidil 2.5 mg daliy and as needed amlodipine in the evening if blood pressure > 140. And taking lasix daily         Review of Systems  Constitutional, HEENT, cardiovascular, pulmonary, GI, , musculoskeletal - due for follow up in orthopedics for braces for ankles, neuro, skin, endocrine - diabetes mellitus and hypothyroidism following in endocrinology, and psych systems are negative, except as otherwise noted.      Objective    /72 (BP Location: Left arm, Patient Position:  Sitting, Cuff Size: Adult Regular)   Pulse 64   Temp (!) 96.6  F (35.9  C) (Temporal)   Resp 18   Ht 1.524 m (5')   Wt 91.5 kg (201 lb 11.2 oz)   LMP  (LMP Unknown)   SpO2 100%   BMI 39.39 kg/m    Body mass index is 39.39 kg/m .  Physical Exam   GENERAL: alert and no distress  EYES: Eyes grossly normal to inspection   HENT: ear canals and TM's normal,   NECK: no adenopathy, no asymmetry, masses, or scars  RESP: lungs clear to auscultation - no rales, rhonchi or wheezes  CV: regular rate and rhythm, normal S1 S2,    ABDOMEN: obese, soft, nontender, no hepatosplenomegaly  MS: + ankle brace + edema bilaterally  SKIN: no suspicious lesions or rashes  NEURO: Normal strength and tone, mentation intact and speech normal  PSYCH: mentation appears normal, affect normal/bright    No results found for any visits on 02/16/24.     Patient Instructions   (E11.69) Type 2 diabetes mellitus with other specified complication, without long-term current use of insulin (H)  (primary encounter diagnosis)  Comment: Due for eye exam this summer.  Follow-up in endocrinology.  Continue current dose of insulin and Jardiance   Plan:     (M79.2) Neuropathic pain  Comment: OK to refill Lyrica to have available for neuropathic pain and shoulder pain  Plan:     (I10) Benign essential hypertension  Comment: blood pressure is stable.  We wlil continue olmesartan and amlodipine   Plan: olmesartan (BENICAR) 40 MG tablet, amLODIPine         (NORVASC) 2.5 MG tablet            (G14) Post-polio syndrome (H28)  Comment: Doing well.  Braces in tact  Plan:     (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)  Comment: No issues  Plan:     (M87.052) Avascular necrosis of bone of hip, left (H)  Comment: stable - conitnue follow up in orthopedics   Plan:     (F32.0) Mild major depression (H24)  Comment: noted mood has been good - no indication for anti-depressants   Plan:     (C73) Papillary carcinoma of thyroid (H)  Comment: Continue levothyroxine   Plan:     (E66.01,   Z68.41) Morbid obesity with BMI of 40.0-44.9, adult (H)  Comment: diet has been stable  Plan:     (N18.30) Stage 3 chronic kidney disease, unspecified whether stage 3a or 3b CKD (H)  Comment: Check labs recently   Plan:     (N18.32) Chronic kidney disease, stage 3b (H)  Comment: as above   Plan:           20 minutes spent with patient.  Over 50% of time counseling     Signed Electronically by: Shola Benoit MD, MD

## 2024-02-16 NOTE — PATIENT INSTRUCTIONS
(E11.69) Type 2 diabetes mellitus with other specified complication, without long-term current use of insulin (H)  (primary encounter diagnosis)  Comment: Due for eye exam this summer.  Follow-up in endocrinology.  Continue current dose of insulin and Jardiance   Plan:     (M79.2) Neuropathic pain  Comment: OK to refill Lyrica to have available for neuropathic pain and shoulder pain  Plan:     (I10) Benign essential hypertension  Comment: blood pressure is stable.  We wlil continue olmesartan and amlodipine   Plan: olmesartan (BENICAR) 40 MG tablet, amLODIPine         (NORVASC) 2.5 MG tablet            (G14) Post-polio syndrome (H28)  Comment: Doing well.  Braces in tact  Plan:     (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)  Comment: No issues  Plan:     (M87.052) Avascular necrosis of bone of hip, left (H)  Comment: stable - conitnue follow up in orthopedics   Plan:     (F32.0) Mild major depression (H24)  Comment: noted mood has been good - no indication for anti-depressants   Plan:     (C73) Papillary carcinoma of thyroid (H)  Comment: Continue levothyroxine   Plan:     (E66.01,  Z68.41) Morbid obesity with BMI of 40.0-44.9, adult (H)  Comment: diet has been stable  Plan:     (N18.30) Stage 3 chronic kidney disease, unspecified whether stage 3a or 3b CKD (H)  Comment: Check labs recently   Plan:     (N18.32) Chronic kidney disease, stage 3b (H)  Comment: as above   Plan:       15

## 2024-02-19 RX ORDER — PREGABALIN 25 MG/1
25 CAPSULE ORAL 2 TIMES DAILY
Qty: 60 CAPSULE | Refills: 11 | Status: SHIPPED | OUTPATIENT
Start: 2024-02-19 | End: 2024-07-29

## 2024-02-20 ENCOUNTER — ANESTHESIA (OUTPATIENT)
Dept: SURGERY | Facility: AMBULATORY SURGERY CENTER | Age: 79
End: 2024-02-20
Payer: MEDICARE

## 2024-02-20 ENCOUNTER — PATIENT OUTREACH (OUTPATIENT)
Dept: CARE COORDINATION | Facility: CLINIC | Age: 79
End: 2024-02-20
Payer: MEDICARE

## 2024-02-20 ENCOUNTER — HOSPITAL ENCOUNTER (OUTPATIENT)
Facility: AMBULATORY SURGERY CENTER | Age: 79
Discharge: HOME | End: 2024-02-20
Attending: UROLOGY
Payer: MEDICARE

## 2024-02-20 VITALS
BODY MASS INDEX: 39.07 KG/M2 | TEMPERATURE: 97 F | RESPIRATION RATE: 16 BRPM | DIASTOLIC BLOOD PRESSURE: 64 MMHG | HEIGHT: 60 IN | OXYGEN SATURATION: 100 % | SYSTOLIC BLOOD PRESSURE: 155 MMHG | WEIGHT: 199 LBS | HEART RATE: 69 BPM

## 2024-02-20 LAB
GLUCOSE BLDC GLUCOMTR-MCNC: 118 MG/DL (ref 70–99)
GLUCOSE BLDC GLUCOMTR-MCNC: 90 MG/DL (ref 70–99)

## 2024-02-20 PROCEDURE — 99100 ANES PT EXTEME AGE<1 YR&>70: CPT | Performed by: NURSE ANESTHETIST, CERTIFIED REGISTERED

## 2024-02-20 PROCEDURE — 52287 CYSTOSCOPY CHEMODENERVATION: CPT | Performed by: ANESTHESIOLOGY

## 2024-02-20 PROCEDURE — 99100 ANES PT EXTEME AGE<1 YR&>70: CPT | Performed by: ANESTHESIOLOGY

## 2024-02-20 PROCEDURE — 52287 CYSTOSCOPY CHEMODENERVATION: CPT

## 2024-02-20 PROCEDURE — 82962 GLUCOSE BLOOD TEST: CPT | Performed by: PATHOLOGY

## 2024-02-20 PROCEDURE — 52287 CYSTOSCOPY CHEMODENERVATION: CPT | Performed by: UROLOGY

## 2024-02-20 PROCEDURE — 52287 CYSTOSCOPY CHEMODENERVATION: CPT | Performed by: NURSE ANESTHETIST, CERTIFIED REGISTERED

## 2024-02-20 RX ORDER — CEFAZOLIN SODIUM 2 G/50ML
2 SOLUTION INTRAVENOUS SEE ADMIN INSTRUCTIONS
Status: DISCONTINUED | OUTPATIENT
Start: 2024-02-20 | End: 2024-02-20 | Stop reason: HOSPADM

## 2024-02-20 RX ORDER — ONDANSETRON 2 MG/ML
INJECTION INTRAMUSCULAR; INTRAVENOUS PRN
Status: DISCONTINUED | OUTPATIENT
Start: 2024-02-20 | End: 2024-02-20

## 2024-02-20 RX ORDER — ONDANSETRON 4 MG/1
4 TABLET, ORALLY DISINTEGRATING ORAL EVERY 30 MIN PRN
Status: DISCONTINUED | OUTPATIENT
Start: 2024-02-20 | End: 2024-02-21 | Stop reason: HOSPADM

## 2024-02-20 RX ORDER — CEFAZOLIN SODIUM 2 G/50ML
2 SOLUTION INTRAVENOUS
Status: COMPLETED | OUTPATIENT
Start: 2024-02-20 | End: 2024-02-20

## 2024-02-20 RX ORDER — LABETALOL HYDROCHLORIDE 5 MG/ML
10 INJECTION, SOLUTION INTRAVENOUS ONCE
Status: COMPLETED | OUTPATIENT
Start: 2024-02-20 | End: 2024-02-20

## 2024-02-20 RX ORDER — ACETAMINOPHEN 325 MG/1
975 TABLET ORAL ONCE
Status: COMPLETED | OUTPATIENT
Start: 2024-02-20 | End: 2024-02-20

## 2024-02-20 RX ORDER — PROPOFOL 10 MG/ML
INJECTION, EMULSION INTRAVENOUS PRN
Status: DISCONTINUED | OUTPATIENT
Start: 2024-02-20 | End: 2024-02-20

## 2024-02-20 RX ORDER — SODIUM CHLORIDE, SODIUM LACTATE, POTASSIUM CHLORIDE, CALCIUM CHLORIDE 600; 310; 30; 20 MG/100ML; MG/100ML; MG/100ML; MG/100ML
INJECTION, SOLUTION INTRAVENOUS CONTINUOUS
Status: DISCONTINUED | OUTPATIENT
Start: 2024-02-20 | End: 2024-02-20 | Stop reason: HOSPADM

## 2024-02-20 RX ORDER — OXYCODONE HYDROCHLORIDE 5 MG/1
5 TABLET ORAL
Status: DISCONTINUED | OUTPATIENT
Start: 2024-02-20 | End: 2024-02-21 | Stop reason: HOSPADM

## 2024-02-20 RX ORDER — OXYCODONE HYDROCHLORIDE 5 MG/1
10 TABLET ORAL
Status: DISCONTINUED | OUTPATIENT
Start: 2024-02-20 | End: 2024-02-21 | Stop reason: HOSPADM

## 2024-02-20 RX ORDER — ONDANSETRON 2 MG/ML
4 INJECTION INTRAMUSCULAR; INTRAVENOUS EVERY 30 MIN PRN
Status: DISCONTINUED | OUTPATIENT
Start: 2024-02-20 | End: 2024-02-21 | Stop reason: HOSPADM

## 2024-02-20 RX ORDER — FENTANYL CITRATE 50 UG/ML
INJECTION, SOLUTION INTRAMUSCULAR; INTRAVENOUS PRN
Status: DISCONTINUED | OUTPATIENT
Start: 2024-02-20 | End: 2024-02-20

## 2024-02-20 RX ORDER — LABETALOL HYDROCHLORIDE 5 MG/ML
10 INJECTION, SOLUTION INTRAVENOUS ONCE
Status: DISCONTINUED | OUTPATIENT
Start: 2024-02-20 | End: 2024-02-20 | Stop reason: HOSPADM

## 2024-02-20 RX ORDER — LABETALOL HYDROCHLORIDE 5 MG/ML
INJECTION, SOLUTION INTRAVENOUS PRN
Status: DISCONTINUED | OUTPATIENT
Start: 2024-02-20 | End: 2024-02-20

## 2024-02-20 RX ORDER — PROPOFOL 10 MG/ML
INJECTION, EMULSION INTRAVENOUS CONTINUOUS PRN
Status: DISCONTINUED | OUTPATIENT
Start: 2024-02-20 | End: 2024-02-20

## 2024-02-20 RX ORDER — LIDOCAINE 40 MG/G
CREAM TOPICAL
Status: DISCONTINUED | OUTPATIENT
Start: 2024-02-20 | End: 2024-02-20 | Stop reason: HOSPADM

## 2024-02-20 RX ADMIN — CEFAZOLIN SODIUM 2 G: 2 SOLUTION INTRAVENOUS at 12:22

## 2024-02-20 RX ADMIN — FENTANYL CITRATE 50 MCG: 50 INJECTION, SOLUTION INTRAMUSCULAR; INTRAVENOUS at 12:22

## 2024-02-20 RX ADMIN — PROPOFOL 120 MCG/KG/MIN: 10 INJECTION, EMULSION INTRAVENOUS at 12:22

## 2024-02-20 RX ADMIN — LABETALOL HYDROCHLORIDE 5 MG: 5 INJECTION, SOLUTION INTRAVENOUS at 12:31

## 2024-02-20 RX ADMIN — PROPOFOL 50 MG: 10 INJECTION, EMULSION INTRAVENOUS at 12:35

## 2024-02-20 RX ADMIN — ONDANSETRON 4 MG: 2 INJECTION INTRAMUSCULAR; INTRAVENOUS at 12:34

## 2024-02-20 RX ADMIN — SODIUM CHLORIDE, SODIUM LACTATE, POTASSIUM CHLORIDE, CALCIUM CHLORIDE: 600; 310; 30; 20 INJECTION, SOLUTION INTRAVENOUS at 12:00

## 2024-02-20 RX ADMIN — LABETALOL HYDROCHLORIDE 10 MG: 5 INJECTION, SOLUTION INTRAVENOUS at 11:19

## 2024-02-20 RX ADMIN — SODIUM CHLORIDE, SODIUM LACTATE, POTASSIUM CHLORIDE, CALCIUM CHLORIDE: 600; 310; 30; 20 INJECTION, SOLUTION INTRAVENOUS at 10:54

## 2024-02-20 ASSESSMENT — LIFESTYLE VARIABLES: TOBACCO_USE: 0

## 2024-02-20 ASSESSMENT — ENCOUNTER SYMPTOMS: SEIZURES: 0

## 2024-02-20 NOTE — ANESTHESIA CARE TRANSFER NOTE
Patient: Chasity Hodgson    Procedure: Procedure(s):  CYSTOSCOPY, WITH BOTULINUM TOXIN INJECTION       Diagnosis: OAB (overactive bladder) [N32.81]  Mixed incontinence [N39.46]  Diagnosis Additional Information: No value filed.    Anesthesia Type:   MAC     Note:    Oropharynx: oropharynx clear of all foreign objects  Level of Consciousness: awake and drowsy  Oxygen Supplementation: room air    Independent Airway: airway patency satisfactory and stable  Dentition: dentition unchanged  Vital Signs Stable: post-procedure vital signs reviewed and stable  Report to RN Given: handoff report given  Patient transferred to: Phase II    Handoff Report: Identifed the Patient, Identified the Reponsible Provider, Reviewed the pertinent medical history, Discussed the surgical course, Reviewed Intra-OP anesthesia mangement and issues during anesthesia, Set expectations for post-procedure period and Allowed opportunity for questions and acknowledgement of understanding      Vitals:  Vitals Value Taken Time   /67 02/20/24 1255   Temp 36.1  C (96.9  F) 02/20/24 1255   Pulse 72 02/20/24 1255   Resp 16 02/20/24 1255   SpO2 99 % 02/20/24 1255       Electronically Signed By: MAGALY Talbot CRNA  February 20, 2024  12:59 PM

## 2024-02-20 NOTE — ANESTHESIA POSTPROCEDURE EVALUATION
Patient: Chasity Hodgson    Procedure: Procedure(s):  CYSTOSCOPY, WITH BOTULINUM TOXIN INJECTION       Anesthesia Type:  MAC    Note:  Disposition: Outpatient   Postop Pain Control: Uneventful            Sign Out: Well controlled pain   PONV: No   Neuro/Psych: Uneventful            Sign Out: Acceptable/Baseline neuro status   Airway/Respiratory: Uneventful            Sign Out: Acceptable/Baseline resp. status   CV/Hemodynamics: Uneventful            Sign Out: Acceptable CV status; No obvious hypovolemia; No obvious fluid overload   Other NRE: NONE   DID A NON-ROUTINE EVENT OCCUR? No       Last vitals:  Vitals Value Taken Time   /64 02/20/24 1325   Temp 36.1  C (97  F) 02/20/24 1325   Pulse 69 02/20/24 1325   Resp 16 02/20/24 1325   SpO2 100 % 02/20/24 1325       Electronically Signed By: Catracihta Hendrickson MD  February 20, 2024  2:16 PM

## 2024-02-20 NOTE — ANESTHESIA PREPROCEDURE EVALUATION
Anesthesia Pre-Procedure Evaluation    Patient: Chasity Hodgson   MRN: [id number] : 1945        Procedure : Procedure(s):  CYSTOSCOPY, WITH BOTULINUM TOXIN INJECTION          Past Medical History:   Diagnosis Date     Abdominal adhesions , ,    s/p lysis     Abdominal adhesions      Acne rosacea      Allergic rhinitis      Allergic rhinitis, cause unspecified      Alopecia      Anemia      CAD (coronary artery disease)      Carotid stenosis      CKD (chronic kidney disease) stage 2, GFR 60-89 ml/min      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)      CPAP (continuous positive airway pressure) dependence      Depression      Diabetic gastroparesis (H)      Diet-controlled type 2 diabetes mellitus (H)      DM2 (diabetes mellitus, type 2) (H)      DVT (deep venous thrombosis) (H)      DVT of axillary vein, acute right (H)      Fibromyalgia      Gastro-oesophageal reflux disease      GERD (gastroesophageal reflux disease)      Hernia of unspecified site of abdominal cavity without mention of obstruction or gangrene     bilateral     Hernia, abdominal      History of blood transfusion     10/ 1980     History of thrombophlebitis      HTN (hypertension)      HTN (hypertension)      Hypertriglyceridemia      Hypertriglyceridemia      Hypokalemia      Hypothyroidism      Mild major depression (H) 2019     Mumps      Obstructive sleep apnea      ANNETTE (obstructive sleep apnea)      ANNETTE on CPAP      Osteoarthritis of glenohumeral joint      Papillary carcinoma of thyroid (H)     s/p thyroidectomy - Ruegemer     Papillary carcinoma of thyroid (H)      PE (pulmonary embolism)      Poliomyelitis     poor circulation right leg     Poliomyelitis      Postsurgical hypothyroidism     s/p papillary thryoid cancer - Ruegemer     Pulmonary embolism (H)      Pulmonary embolus (H)      Pulmonary nodule      Rosacea      S/P carpal tunnel release     bilateral     S/P hardware removal 2014    still with lingering foot pain     S/P  shoulder surgery     bilateral     Septic joint (H)     right knee     Septic joint of right knee joint (H)      Venous insufficiency      Venous insufficiency      Venous thrombosis 1999    right axillary vein      Past Surgical History:   Procedure Laterality Date     AMPUTATE TOE(S)  03/15/2012    Procedure:AMPUTATE TOE(S); Surgeon:RUBEN MOSES; Location: OR     AMPUTATE TOE(S)       APPENDECTOMY  1972     APPENDECTOMY       ARTHRODESIS FOOT  03/15/2012    Procedure:ARTHRODESIS FOOT; RIGHT TRIPLE ARTHRODESIS, FIFTH TOE AMPUTATION, LATERAL LIGAMENT RECONSTRUCTION, TENDON TRANSFER AND RELEASE [MINI C-ARM, ARTHREX 4.5 AND 6.7 STAPLES, BIOCOMPOSITE TENODESIS SCREWS]; Surgeon:RUBEN MOSES; Location: OR     ARTHRODESIS FOOT Right     Right foot triple arthrodesis and removal of hardware     ARTHROSCOPY SHOULDER  06/25/2015    REVISION SUBACROMIAL DECOMPRESSION, EXCISION OF GANGLION CYST, DEBRIDEMENT AND EXCISION OF THE GLENOHUMERAL JOINT GANGLION CYST, CORACOID DECOMPRESSION, POSSIBLE SUBSCAPULARIS REPAIR AND OPEN SUBSCAPULARIS BICEP     ARTHROSCOPY SHOULDER ROTATOR CUFF REPAIR       BIOPSY  Thyroid 2002     BLADDER SURGERY       BOTOX INJECTION MEDICAL       BREAST SURGERY  Biopsy     CHOLECYSTECTOMY       CHOLECYSTECTOMY       COLONOSCOPY  2018     COLOSTOMY  02/07/2012    Procedure:COLOSTOMY; CREATION OF SIGMOID COLOSTOMY AND EXTENSIVE  LYSIS OF ADHESIONS; Surgeon:MONTSERRAT BENDER; Location: OR     COLOSTOMY       CV ANGIOGRAM RENAL BILATERAL Bilateral 11/17/2023    Procedure: Angiogram Renal Bilateral;  Surgeon: Ankit Bhatia MD;  Location:  [facility]     CV CORONARY ANGIOGRAM N/A 11/17/2023    Procedure: Coronary Angiogram;  Surgeon: Ankit Bhatia MD;  Location:  [facility]     CYSTOSCOPY       CYSTOSCOPY, INJECT BOTOX N/A 09/18/2023    Procedure: CYSTOSCOPY, WITH BOTULINUM TOXIN INJECTION;  Surgeon: Mishel Zendejas MD;   Location: [facility] OR     EYE SURGERY       GENITOURINARY SURGERY  1999     GI SURGERY      weakened rectal sphincter with artificial stimulator     HERNIA REPAIR  1976     HYSTERECTOMY TOTAL ABDOMINAL       LAPAROTOMY, LYSIS ADHESIONS, COMBINED  02/07/2012    Procedure:COMBINED LAPAROTOMY, LYSIS ADHESIONS; Surgeon:MONTSERRAT BENDER; Location:[facility] OR     RELEASE CARPAL TUNNEL       RELEASE TENDON FOOT  03/15/2012    Procedure:RELEASE TENDON FOOT; Surgeon:SUKHDEEP METZ; Location:[facility] OR     REMOVE HARDWARE FOOT  12/13/2012    Procedure: REMOVE HARDWARE FOOT;  RIGHT FOOT REMOVAL OF HARDWARE;  Surgeon: Sukhdeep Metz MD;  Location: [facility] OR     SHOULDER SURGERY  11/12/2020    LEFT SHOULDER HEMIARHTROPLASTY, BICEP TENODESIS     SOFT TISSUE SURGERY  2018, 2020     TENDON RELEASE      foot     THYROIDECTOMY       ZZC FREEING BOWEL ADHESION,ENTEROLYSIS      1986, 1996, 1999     ZZC NONSPECIFIC PROCEDURE      throidectomy     ZZC TOTAL ABDOM HYSTERECTOMY  1980    + BSO      Allergies   Allergen Reactions     Ketorolac Tromethamine Difficulty breathing     Shortness of breath to tablets only per the patient     Nsaids Difficulty breathing     Increased creatinine     Celecoxib Itching and Rash     Morphine And Related Itching     With higher doses. Pt denies this allergy 11/16/2023     Codeine Itching     Crestor [Rosuvastatin] Muscle Pain (Myalgia)     No Clinical Screening - See Comments Itching     Fragrance     Vioxx Other (See Comments)     Heart races     Conjugated Estrogens Rash     Sulfa Antibiotics Rash      Social History     Tobacco Use     Smoking status: Never     Passive exposure: Never     Smokeless tobacco: Never   Substance Use Topics     Alcohol use: Never      Wt Readings from Last 1 Encounters:   02/16/24 91.5 kg (201 lb 11.2 oz)        Anesthesia Evaluation   Pt has had prior anesthetic.         ROS/MED HX  ENT/Pulmonary:     (+) sleep apnea, mild, doesn't use CPAP,         allergic  rhinitis,                          (-) tobacco use   Neurologic: Comment: Right leg paralysis due to polio history    (-) no seizures and no CVA   Cardiovascular:     (+)  hypertension- -  CAD -  - -   Taking blood thinners                              Previous cardiac testing   Echo: Date: 11/2023 Results:  Interpretation Summary     The left ventricle is normal in size.  Left ventricular systolic function is normal. The visual ejection fraction is  60-65%.  Normal left ventricular wall motion  The aortic valve is trileaflet with aortic valve sclerosis. No hemodynamically  significant valvular aortic stenosis.  The right ventricle is normal in structure, function and size.  Compared to prior study, there is no significant change.    Stress Test:  Date: Results:    ECG Reviewed:  Date: Results:    Cath:  Date: 11/2023 Results:  1.  No obstructive coronary artery disease on coronary angiography.  Study essentially unchanged from 2018.  2.  No high-grade proximal or ostial stenosis on bilateral renal angiogram.      METS/Exercise Tolerance: 3 - Able to walk 1-2 blocks without stopping Comment: Some limitations due to right leg paralysis. Uses walker. Some walking, seated exercises. Cleaning around the house    Hematologic:     (+) History of blood clots,    pt is not anticoagulated,  history of blood transfusion, no previous transfusion reaction,        Musculoskeletal:  - neg musculoskeletal ROS     GI/Hepatic: Comment: Colostomy present     (+) GERD, Asymptomatic on medication,                  Renal/Genitourinary:     (+) renal disease, type: CRI,            Endo:     (+)  type II DM,   Using insulin, - not using insulin pump. Normal glucose range: 150s,   thyroid problem, hypothyroidism,    Obesity (BMI 40),       Psychiatric/Substance Use:  - neg psychiatric ROS     Infectious Disease:  - neg infectious disease ROS     Malignancy:   (+) Malignancy, History of Other.Other CA thyroid Remission status post Surgery.     Other:            Physical Exam    Airway        Mallampati: II   TM distance: > 3 FB   Neck ROM: full   Mouth opening: > 3 cm    Respiratory Devices and Support         Dental     Comment: Multiple crowns      B=Bridge, C=Chipped, L=Loose, M=Missing    Cardiovascular          Rhythm and rate: regular and normal     Pulmonary           breath sounds clear to auscultation       OUTSIDE LABS:  CBC:   Lab Results   Component Value Date    WBC 7.3 11/27/2023    WBC 6.1 11/17/2023    HGB 11.6 (L) 11/27/2023    HGB 10.8 (L) 11/17/2023    HCT 36.0 11/27/2023    HCT 31.8 (L) 11/17/2023     11/27/2023    PLT  11/17/2023      Comment:      Platelets Clumped-Platelet Count Not Available     BMP:   Lab Results   Component Value Date     01/22/2024     11/27/2023    POTASSIUM 5.1 01/22/2024    POTASSIUM 4.3 11/27/2023    CHLORIDE 108 (H) 01/22/2024    CHLORIDE 106 11/27/2023    CO2 25 01/22/2024    CO2 21 (L) 11/27/2023    BUN 27.5 (H) 01/22/2024    BUN 35.1 (H) 11/27/2023    CR 1.15 (H) 01/22/2024    CR 1.19 (H) 11/27/2023     (H) 01/22/2024     (H) 11/27/2023     COAGS:   Lab Results   Component Value Date    PTT 27 02/23/2018    INR 0.98 02/23/2018     POC:   Lab Results   Component Value Date     (H) 08/05/2019     HEPATIC:   Lab Results   Component Value Date    ALBUMIN 3.9 11/16/2023    PROTTOTAL 6.7 11/16/2023    ALT 16 01/22/2024    AST 31 11/16/2023    ALKPHOS 47 11/16/2023    BILITOTAL 0.2 11/16/2023     OTHER:   Lab Results   Component Value Date    A1C 6.8 (H) 02/13/2024    RINKU 9.2 01/22/2024    PHOS 3.8 03/09/2023    MAG 2.2 08/31/2023    LIPASE 15 11/20/2020    TSH 2.88 12/11/2023    T4 2.00 (H) 12/11/2023    CRP 7.2 08/01/2018    SED 17 01/20/2023       Anesthesia Plan    ASA Status:  3    NPO Status:  NPO Appropriate    Anesthesia Type: MAC.     - Reason for MAC: straight local not clinically adequate              Consents    Anesthesia Plan(s) and associated risks,  benefits, and realistic alternatives discussed. Questions answered and patient/representative(s) expressed understanding.     - Discussed: Risks, Benefits and Alternatives for BOTH SEDATION and the PROCEDURE were discussed     - Discussed with:  Patient      - Specific Concerns: higher blood pressure can be associated with stroke.     - Extended Intubation/Ventilatory Support Discussed: No.      - Patient is DNR/DNI Status: No     Use of blood products discussed: No .     Postoperative Care    Pain management: IV analgesics, Oral pain medications.   PONV prophylaxis: Background Propofol Infusion     Comments:               Catrachita Hendrickson MD    I have reviewed the pertinent notes and labs in the chart from the past 30 days and (re)examined the patient.  Any updates or changes from those notes are reflected in this note.              # DMII: A1C = 6.8 % (Ref range: 0.0 - 5.6 %) within past 6 months  # Obesity: Estimated body mass index is 39.39 kg/m  as calculated from the following:    Height as of 2/16/24: 1.524 m (5').    Weight as of 2/16/24: 91.5 kg (201 lb 11.2 oz).

## 2024-02-20 NOTE — DISCHARGE INSTRUCTIONS
[facility]  Home Care Following Anesthesia  For 24 hours after surgery:  Get plenty of rest.  A responsible adult must stay with you for at least 24 hours after you leave the surgery center.  Do not drive or use heavy equipment.  If you have weakness or tingling, don't drive or use heavy equipment until this feeling goes away.   Do not drink alcohol.   Avoid strenuous or risky activities.  Ask for help when climbing stairs.  You may feel lightheaded.  IF so, sit for a few minutes before standing.  Have someone help you get up.   If you have nausea (feel sick to your stomach): Drink only clear liquids such as apple juice, ginger ale, broth or 7-Up.  Rest may also help.  Be sure to drink enough fluids.  Move to a regular diet as you feel able.   You may have a slight fever.  Call the doctor if your fever is over 100 F (37.7 C) (taken under the tongue) or lasts longer than 24 hours.  You may have a dry mouth, a sore throat, muscle aches or trouble sleeping. These should go away after 24 hours.  Do not make important or legal decisions.   It is recommended to avoid smoking.               Tips for taking pain medications  To get the best pain relief possible, remember these points:  Take pain medications as directed, before pain becomes severe.  Pain medication can upset your stomach: taking it with food may help.  Constipation is a common side effect of pain medication. Drink plenty of  fluids.  Eat foods high in fiber. Take a stool softener if recommended by your doctor or pharmacist.  Do not drink alcohol, drive or operate machinery while taking pain medications.  Ask about other ways to control pain, such as with heat, ice or relaxation.    Tylenol/Acetaminophen Consumption    If you feel your pain relief is insufficient, you may take Tylenol/Acetaminophen in addition to your narcotic pain medication.   Be careful not to exceed 4,000 mg of Tylenol/Acetaminophen in a 24 hour  period from all sources.  If you are taking extra strength Tylenol/acetaminophen (500 mg), the maximum dose is 8 tablets in 24 hours.  If you are taking regular strength acetaminophen (325 mg), the maximum dose is 12 tablets in 24 hours.    Call a doctor for any of the following:  Signs of infection (fever, growing tenderness at the surgery site, a large amount of drainage or bleeding, severe pain, foul-smelling drainage, redness, swelling).  It has been over 8 to 10 hours since surgery and you are still not able to urinate (pass water).  Headache for over 24 hours.  Numbness, tingling or weakness the day after surgery (if you had spinal anesthesia).  Signs of Covid-19 infection (temperature over 100 degrees, shortness of breath, cough, loss of taste/smell, generalized body aches, persistent headache, chills, sore throat, nausea/vomiting/diarrhea)  Your doctor is:  Dr. Mishel Zendejas, Prostate and Urology: [phone]                    Or dial [phone] and ask for the resident on call for:  Prostate Urology  For emergency care, call the:  [facility]:  [phone] (TTY for hearing impaired: [phone])

## 2024-02-20 NOTE — OP NOTE
February 20, 2024    Preoperative diagnosis:  Overactive bladder  Mixed urinary incontinence  Coronary artery disease  History of DVT/PE  Obstructive sleep apnea  Diabetes  Gastroparesis    Postoperative diagnosis: Same    Procedure: Cystoscopy with intravesical injection of botulinum toxin (150 units in 15mL of injectable saline)    Surgeon: Mishel Zendejas MD     Assistant: Non    Anesthesia: MAC    EBL: minimal  UOP: not recorded  IVF: please see anesthesia record    Drains: None    Complications: None    Specimens: None    Findings: Trabeculated bladder otherwise unremarkable    Indication for procedure: Chasity Hodgson is 78 year old with OAB not improved with medications and PT.  She is here today for repeat botox injection, last injection was 9/18/23.  Given the symptom improvement lasted about 2 months she has requested we increase the dosage to see if better symptom control.  She is currently on antibiotics since Friday for a positive urine culture    Summary of procedure:  After patient was identified in the pre-operative area and procedure verified, informed consent was obtained.  After this patient was taken to the operating suite and placed in the supine position.  Intravenous blessing-operative antibiotics were administered by anesthesia.  After appropriate anesthesia was induced and the airway secured, patient was placed in the dorsal lithotomy position in supportive yellow-fin stirrups with careful attention to neural safety in positioning of all four extremities.  At this time patient was prepped and draped in the standard fashion.      A time out was performed to confirm patient and procedure.    A Patti injection cystoscope was inserted into a normal appearing urethral meatus.  The urothelium was carefully examined and there were the above lesions noted.  There were no obvious tumors, stones, foreign body or other urothelial abnormalities noted. Bilateral ureteral orifices were noted in the normal  orthotopic position and both effluxed clear urine.      The botulinum toxin was injected in 1 mL aliquots in a templated fashion in the bladder for a total of 150 units.  After the medication injected the bladder was drained and reinspected under low pressure    Once appropriate hemostasis was obtained the bladder was drained and the procedure terminated.  Counts were reported as correct    Patient went to the recovery room in good condition    Plan: Void prior to discharge with PVR < 150mL.  Discharge when meets criteria    Mishel Zendejas MD MPH  (she/her/hers)   of Urology  [facility]

## 2024-02-20 NOTE — OR NURSING
Patient presents for cystoscopy with Dr. Zendejas.  Reports that she his currently being treated for a UTI.  Alerted Dr. Zendejas.  No new orders.    BP has been hard to assess.  Can only be taken on the right upper arm for patient comfort.  /79, HR 71.  Patient took her Coreg and Monixidil but has not taken her ARB today and has held her Norvasc for 4 days.  Alerted Dr. Alfaro and order received for Labetelol 10 mg IV.  Patient placed on monitors and rhythm is SR in the 70s.  Will continue to monitor.

## 2024-02-23 ENCOUNTER — HOSPITAL ENCOUNTER (OUTPATIENT)
Dept: GENERAL RADIOLOGY | Facility: CLINIC | Age: 79
Discharge: HOME | End: 2024-02-23
Attending: INTERNAL MEDICINE | Admitting: INTERNAL MEDICINE
Payer: MEDICARE

## 2024-02-23 ENCOUNTER — PATIENT OUTREACH (OUTPATIENT)
Dept: CARE COORDINATION | Facility: CLINIC | Age: 79
End: 2024-02-23
Payer: MEDICARE

## 2024-02-23 DIAGNOSIS — K92.9 DIGESTIVE PROBLEMS: ICD-10-CM

## 2024-02-23 PROCEDURE — 250N000013 HC RX MED GY IP 250 OP 250 PS 637: Performed by: INTERNAL MEDICINE

## 2024-02-23 PROCEDURE — 74240 X-RAY XM UPR GI TRC 1CNTRST: CPT

## 2024-02-23 RX ADMIN — ANTACID/ANTIFLATULENT 4 G: 380; 550; 10; 10 GRANULE, EFFERVESCENT ORAL at 08:43

## 2024-02-23 NOTE — PROGRESS NOTES
Clinic Care Coordination Contact  Care Coordination Clinician Chart Review    Situation: Patient chart reviewed by Care Coordinator.       Background: Care Coordination Program started: 11/27/2023. Initial assessment completed and patient-centered care plan(s) were developed with participation from patient. Lead CC handed patient off to CHW for continued outreaches.       Assessment: Per chart review, patient outreach completed by CC CHW on 2/5/24.  Patient is actively working to accomplish goal(s). Patient's goal(s) appropriate and relevant at this time. Patient is not due for updated Plan of Care.  Assessments will be completed annually or as needed/with change of patient status.      Care Plan: Community Resources       Problem: Insufficient In-home support       Note:     Goal Statement: Sammie will continue working with Care Coordination to ensure her needs are met for overall health and wellbeing.  Date Goal Set: 12/5/23  Barriers: Limited finances  Strengths: Strong support system  Date to Achieve By: 12/5/24  Patient expressed understanding of goal: yes  Action steps to achieve this goal:  1. I will continue to follow up with medical team as needed  2. I will work with FRW on discussing and applying for E/W program  4. I will consider options for chore services in my home from CCSW and CHW  5. I will outreach to Care Coordination  for further questions or concerns          Goal: Establish adequate home support       This Visit's Progress: 10%                               Plan/Recommendations: The patient will continue working with Care Coordination to achieve goal(s) as above. CHW will continue outreaches at minimum every 30 days and will involve Lead CC as needed or if patient is ready to move to Maintenance. Lead CC will continue to monitor CHW outreaches and patient's progress to goal(s) every 6 weeks.     Plan of Care updated and sent to patient: Yes, via Carlos Alberto Hennessy   [facility]  Clinic Care Coordinator  [facility]  [phone]  [email or web address]

## 2024-02-23 NOTE — LETTER
[facility]  Patient Centered Plan of Care  About Me:        Patient Name:  Chasity Brooke    YOB: 1945  Age:         78 year old   Jean MRN:    [id number] Telephone Information:  Home Phone [phone]   Mobile [phone]       Address:  [address] [place] [address] Email address:  [email or web address]      Emergency Contact(s)    Name Relationship Lgl Grd Work Phone Home Phone Mobile Phone   1. LETICIA GALLEGOS Daughter No  [phone]   2. JOSE BROOKE Son No NONE [phone]   3. NONE Spouse No              Primary language:  English     needed? No   [facility]:  [phone] op. 1  Other communication barriers:None    Preferred Method of Communication:  Carlos [place]  Current living arrangement: I live in a private home    Mobility Status/ Medical Equipment: Independent w/Device        Health Maintenance  Health Maintenance Reviewed: Due/Overdue   Health Maintenance Due   Topic Date Due    URINE DRUG SCREEN  Never done    RSV VACCINE (Pregnancy & 60+) (1 - 1-dose 60+ series) Never done    ZOSTER IMMUNIZATION (2 of 3) 10/24/2016    MEDICARE ANNUAL WELLNESS VISIT  10/12/2023           My Access Plan  Medical Emergency 911   Primary Clinic Line [facility] [phone]   24 Hour Appointment Line [phone] or  [phone]) (toll-free)   24 Hour Nurse Line [phone] (toll-free)   Preferred Urgent Care [facility], [phone]     Preferred Hospital [facility]  [phone]     Preferred Pharmacy [facility] [address]     Behavioral Health Crisis Line The National Suicide Prevention Lifeline at 1-578.171.2700 or Text/Call 818           My Care Team Members  Patient Care Team         Relationship Specialty Notifications Start End    Shola Benoit MD PCP - General Internal Medicine  10/22/12      Phone: [phone] Pager: [phone] Fax: [phone] [address] [place] [facility] [address]    Shola Benoit MD Assigned PCP   10/4/12     Phone: [phone] Pager: [phone] Fax: [phone] [address] [facility] [address]    Renetta Meyer MD MD Dermatology  4/17/15     Phone: [phone] Fax: [phone]         [address] [facility] [address] [facility] [address]    ROMI Roque MD MD INTERNAL MEDICINE - ENDOCRINOLOGY, DIABETES & METABOLISM  2/23/16     Phone: [phone] Fax: [phone]         ENDOCRINE CLINIC [place] [address] [facility] [address]    Ulises Pantoja MD MD Gastroenterology  9/12/17     Phone: [phone] Fax: [phone]         [facility] [address] [place] [address]    Kishore Ferrera MD MD Orthopedics  2/23/18     Phone: [phone] Fax: [phone]         [address] [place] [address] [address] [facility] [address]    Tara Cornejo, RN Diabetes Educator Diabetes Education  8/22/19     Phone: [phone] Fax: [phone]        Catrachita Prather, PharmD Pharmacist Pharmacist  7/26/20     Phone: [phone] Fax: [phone] [address] [facility] [address]    Odette Weston MD MD Endocrinology, Diabetes, and Metabolism  9/13/21     Phone: [phone] Fax: [phone]         [facility] [place] [address]    Odette Weston MD Assigned Endocrinology Provider   10/3/21     Phone: [phone] Fax: [phone]         [facility] [place] [address]    Goltz, Bennett Ezra, PA-C Assigned Neuroscience Provider   9/17/22     Phone: [phone] Fax: [phone] [address] [facility] [address]    Terra Bridges PA-C Physician Assistant Urology  10/14/22     Phone: [phone] Fax: [phone]         [address] [place] [facility] [address]    Amber Chan APRN CNP Assigned Heart and Vascular Provider   4/8/23      Phone: [phone] Fax: [phone] [address] [place] [address]    Marbella Martinez PA-C Physician Assistant Urology  5/23/23     Phone: [phone] Fax: [phone]         [address] [facility] [address]    Yolanda Tapia PA-C Physician Assistant Endocrinology, Diabetes, and Metabolism  6/9/23     Phone: [phone] Fax: [phone]         [address] [place] [address]    Kaelyn Hennessy [facility] Lead Care Coordinator  Admissions 11/27/23     Phone: [phone]         Renetta Meyer MD Assigned Surgical Provider   12/2/23     Phone: [phone] Fax: [phone]         [address] [place] [address]    Nikia Wan CHW Community Health Worker Primary Care - CC Admissions 12/5/23     Phone: [phone]         Lexie Louie MA Financial Resource Worker   12/6/23     Phone: [phone]         Lupe Mcrae PA-C Physician Assistant   1/16/24     Phone: [phone] Fax: [phone]         [address] [facility] [address]                My Care Plans  Self Management and Treatment Plan    Care Plan  Care Plan: Community Resources       Problem: Insufficient In-home support       Note:     Goal Statement: Sammie will continue working with Care Coordination to ensure her needs are met for overall health and wellbeing.  Date Goal Set: 12/5/23  Barriers: Limited finances  Strengths: Strong support system  Date to Achieve By: 12/5/24  Patient expressed understanding of goal: yes  Action steps to achieve this goal:  1. I will continue to follow up with medical team as needed  2. I will work with FRW on discussing and applying for E/W program  4. I will consider options for chore services in my home from CCSW and CHW  5. I will outreach to Care Coordination  for further questions or concerns          Goal: Establish adequate home support       This Visit's Progress: 10%                            Action Plans on File:                        Advance Care Plans/Directives:   Advanced Care Plan/Directives on file:   Yes    Status of Document(s):   On File and Validated    Advanced Care Plan/Directives Type:   Advanced Directive - On File           My Medical and Care Information  Problem List   Patient Active Problem List   Diagnosis    Low back pain    Mixed hyperlipidemia    Benign essential hypertension    Fibromyalgia    Allergic rhinitis    ANNETTE (obstructive sleep apnea)    Cavovarus deformity of foot    History of DVT (deep vein thrombosis)    Hypokalemia    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)    Anemia due to blood loss, acute    ACP (advance care planning)    Postsurgical hypothyroidism    Type 2 diabetes, HbA1c goal < 7% (H)    Gastroparesis    Papillary carcinoma of thyroid (H)    Alopecia    Pulmonary nodule    Esophageal reflux    Dermatitis    Acne rosacea    Diabetic gastroparesis (H)    Poliomyelitis    Left knee pain    Carotid stenosis    Right shoulder pain    Type 2 diabetes mellitus with other specified complication (H)    Insufficiency, arterial, peripheral (H24)    Allergic dermatitis due to other chemical product    Normocytic anemia    Morbid obesity with BMI of 40.0-44.9, adult (H)    Mild major depression (H)    Chronic kidney disease, stage 3b (H)    Renal artery stenosis (H24)    Neuropathic pain    Mild major depression (H24)    Venous insufficiency    Avascular necrosis of bone of hip, left (H)    Contact dermatitis    Diabetic nephropathy associated with type 2 diabetes mellitus (H)    DVT of upper extremity (deep vein thrombosis) (H)    Dysphagia    Edema of lower extremity    History of colonic polyps    History of pulmonary embolus (PE)    Iron deficiency anemia    Inguinal pain    Moderate protein-calorie malnutrition (H24)    Osteoarthritis of left hip    Primary insomnia    Right lower quadrant pain    Status post total shoulder arthroplasty, left    Surgical aftercare, musculoskeletal system    Vitamin D deficiency     Lymphedema    Atypical chest pain    Episodic tension-type headache, not intractable    Blurred vision    Pain of cheek    Abdominal adhesions    Mixed incontinence    OAB (overactive bladder)    Cellulitis of lower extremity, unspecified laterality    Cellulitis    Acute chest pain    Venous stasis ulcer of left lower leg with edema of left lower leg (H)    Post-polio syndrome (H28)      Current Medications and Allergies:  See printed Medication Report.    Care Coordination Start Date: 11/27/2023   Frequency of Care Coordination: monthly, more frequently as needed     Form Last Updated: 02/23/2024

## 2024-02-28 ENCOUNTER — PATIENT OUTREACH (OUTPATIENT)
Dept: CARE COORDINATION | Facility: CLINIC | Age: 79
End: 2024-02-28
Payer: MEDICARE

## 2024-02-28 NOTE — PROGRESS NOTES
Clinic Care Coordination Contact  The Community Health Worker called for a Care Coordination outreach to follow up on goals and action steps.     Patient stated she was not available to talk.  Patient requested a call back tomorrow.    CHW will outreach in 1 -2 business days.    HERMANN Hwang  Clinic Care Coordination  [facility]: [facility], [place], [facility], and [facility]  Phone: [phone]

## 2024-02-29 ENCOUNTER — TRANSFERRED RECORDS (OUTPATIENT)
Dept: HEALTH INFORMATION MANAGEMENT | Facility: CLINIC | Age: 79
End: 2024-02-29
Payer: MEDICARE

## 2024-02-29 DIAGNOSIS — E11.9 TYPE 2 DIABETES, HBA1C GOAL < 7% (H): ICD-10-CM

## 2024-02-29 NOTE — TELEPHONE ENCOUNTER
PRESCRIPTIONS MUST BE WRITTEN THIS WAY TO MAKE THEM MEDICARE COMPLIANT    FREESTYLE BRANDY 3 READER  SIG: Use to read blood sugars as  s instructions.  QTY: 1   REFILLS: 0    FREESTYLE BRANDY 3 SENSORS  SIG: Change every 14 days.  QTY: 2  REFILLS: 5    -Prescriptions must be written after the clinical note date and will only be able to be used for 6 months from the date of the clinical notes. All prescriptions must be from same provider who patient had visit with. (We will be requesting new clinical notes and prescriptions every 6 months to meet Medicare Guidelines.)    Please contact us at [phone] (this number is for clinics only) with any questions. Please only give [phone] to patients.    [facility]   [address] [place] [address]  Phone # [phone]  Fax # [phone]

## 2024-03-01 ENCOUNTER — PATIENT OUTREACH (OUTPATIENT)
Dept: CARE COORDINATION | Facility: CLINIC | Age: 79
End: 2024-03-01
Payer: MEDICARE

## 2024-03-01 RX ORDER — KETOROLAC TROMETHAMINE 30 MG/ML
1 INJECTION, SOLUTION INTRAMUSCULAR; INTRAVENOUS ONCE
Qty: 1 EACH | Refills: 0 | Status: SHIPPED | OUTPATIENT
Start: 2024-03-01 | End: 2024-03-01

## 2024-03-01 RX ORDER — BLOOD-GLUCOSE SENSOR
1 EACH MISCELLANEOUS
Qty: 2 EACH | Refills: 5 | Status: SHIPPED | OUTPATIENT
Start: 2024-03-01 | End: 2024-06-04

## 2024-03-01 NOTE — PROGRESS NOTES
Clinic Care Coordination Contact  [facility]/Voicemail    Clinical Data: Care Coordinator Outreach    Outreach Documentation Number of Outreach Attempt   2/5/2024  10:52 AM 1   3/1/2024   2:35 PM 1       Left message on patient's voicemail with call back information and requested return call.    Plan: Care Coordinator will try to reach patient again in 10 business days.    HERMANN Hwang  Clinic Care Coordination  [facility]: Katy Ybarra [place], and [place] [facility]  Phone: [phone]

## 2024-03-04 ENCOUNTER — LAB (OUTPATIENT)
Dept: LAB | Facility: CLINIC | Age: 79
End: 2024-03-04
Payer: MEDICARE

## 2024-03-04 DIAGNOSIS — R30.0 DYSURIA: ICD-10-CM

## 2024-03-04 DIAGNOSIS — N18.32 CHRONIC KIDNEY DISEASE, STAGE 3B (H): Primary | ICD-10-CM

## 2024-03-04 LAB
ALBUMIN UR-MCNC: NEGATIVE MG/DL
APPEARANCE UR: CLEAR
BILIRUB UR QL STRIP: NEGATIVE
COLOR UR AUTO: YELLOW
GLUCOSE UR STRIP-MCNC: >=1000 MG/DL
HGB UR QL STRIP: NEGATIVE
KETONES UR STRIP-MCNC: NEGATIVE MG/DL
LEUKOCYTE ESTERASE UR QL STRIP: NEGATIVE
NITRATE UR QL: NEGATIVE
PH UR STRIP: 5 [PH] (ref 5–7)
RBC #/AREA URNS AUTO: ABNORMAL /HPF
RENAL EPI CELLS #/AREA URNS HPF: ABNORMAL /HPF
SP GR UR STRIP: 1.01 (ref 1–1.03)
SQUAMOUS #/AREA URNS AUTO: ABNORMAL /LPF
UROBILINOGEN UR STRIP-ACNC: 0.2 E.U./DL
WBC #/AREA URNS AUTO: ABNORMAL /HPF

## 2024-03-04 PROCEDURE — 87186 SC STD MICRODIL/AGAR DIL: CPT

## 2024-03-04 PROCEDURE — 82570 ASSAY OF URINE CREATININE: CPT

## 2024-03-04 PROCEDURE — 87086 URINE CULTURE/COLONY COUNT: CPT

## 2024-03-04 PROCEDURE — 81001 URINALYSIS AUTO W/SCOPE: CPT

## 2024-03-04 PROCEDURE — 82043 UR ALBUMIN QUANTITATIVE: CPT

## 2024-03-04 PROCEDURE — 87088 URINE BACTERIA CULTURE: CPT

## 2024-03-05 LAB
CREAT UR-MCNC: 82.7 MG/DL
MICROALBUMIN UR-MCNC: <12 MG/L
MICROALBUMIN/CREAT UR: NORMAL MG/G{CREAT}

## 2024-03-06 LAB — BACTERIA UR CULT: ABNORMAL

## 2024-03-07 ENCOUNTER — HOSPITAL ENCOUNTER (OUTPATIENT)
Dept: MAMMOGRAPHY | Facility: CLINIC | Age: 79
Discharge: HOME | End: 2024-03-07
Attending: INTERNAL MEDICINE
Payer: MEDICARE

## 2024-03-07 ENCOUNTER — HOSPITAL ENCOUNTER (OUTPATIENT)
Dept: BONE DENSITY | Facility: CLINIC | Age: 79
Discharge: HOME | End: 2024-03-07
Attending: INTERNAL MEDICINE
Payer: MEDICARE

## 2024-03-07 DIAGNOSIS — N39.0 URINARY TRACT INFECTION WITHOUT HEMATURIA, SITE UNSPECIFIED: Primary | ICD-10-CM

## 2024-03-07 DIAGNOSIS — Z78.0 POST-MENOPAUSAL: ICD-10-CM

## 2024-03-07 DIAGNOSIS — Z12.31 ENCOUNTER FOR SCREENING MAMMOGRAM FOR BREAST CANCER: ICD-10-CM

## 2024-03-07 PROCEDURE — 77080 DXA BONE DENSITY AXIAL: CPT

## 2024-03-07 PROCEDURE — 77067 SCR MAMMO BI INCL CAD: CPT

## 2024-03-07 RX ORDER — CEPHALEXIN 500 MG/1
500 CAPSULE ORAL 2 TIMES DAILY
Qty: 14 CAPSULE | Refills: 0 | Status: SHIPPED | OUTPATIENT
Start: 2024-03-07 | End: 2024-03-25

## 2024-03-07 NOTE — RESULT ENCOUNTER NOTE
Gerard Gaspar,    I have had the opportunity to review your recent results and an interpretation is as follows:  Your bone density scan does show osteopenia with minimal change compared to your scan from 2014.  I would recommend you continue on calcium and vitamin D, and we could consider initiation of a medication such as Fosamax or IV zoledronate to help prevent fracture.  Please schedule a virtual visit follow-up to discuss this    Sincerely,  Shola Benoit MD

## 2024-03-08 ENCOUNTER — DOCUMENTATION ONLY (OUTPATIENT)
Dept: OTHER | Facility: CLINIC | Age: 79
End: 2024-03-08
Payer: MEDICARE

## 2024-03-12 ENCOUNTER — VIRTUAL VISIT (OUTPATIENT)
Dept: FAMILY MEDICINE | Facility: CLINIC | Age: 79
End: 2024-03-12
Payer: MEDICARE

## 2024-03-12 DIAGNOSIS — M85.851 OSTEOPENIA OF RIGHT HIP: ICD-10-CM

## 2024-03-12 DIAGNOSIS — N18.32 CHRONIC KIDNEY DISEASE, STAGE 3B (H): ICD-10-CM

## 2024-03-12 DIAGNOSIS — E11.43 DIABETIC GASTROPARESIS (H): ICD-10-CM

## 2024-03-12 DIAGNOSIS — K21.9 GASTROESOPHAGEAL REFLUX DISEASE WITHOUT ESOPHAGITIS: Primary | ICD-10-CM

## 2024-03-12 DIAGNOSIS — K31.84 DIABETIC GASTROPARESIS (H): ICD-10-CM

## 2024-03-12 DIAGNOSIS — S86.812A STRAIN OF LEFT CALF MUSCLE: ICD-10-CM

## 2024-03-12 PROCEDURE — 99213 OFFICE O/P EST LOW 20 MIN: CPT | Mod: 95 | Performed by: INTERNAL MEDICINE

## 2024-03-12 NOTE — PROGRESS NOTES
Sammie is a 78 year old who is being evaluated via a billable video visit.      How would you like to obtain your AVS? MyChart  If the video visit is dropped, the invitation should be resent by: Text to cell phone: [phone]  Will anyone else be joining your video visit? No              Subjective   Sammie is a 78 year old, presenting for the following health issues:  Follow Up (Results about Bone density test. )    History of Present Illness       Reason for visit:  Dr Benoit asked me to schedule a virtual visit to discuss results of a test.    She eats 2-3 servings of fruits and vegetables daily.She consumes 0 sweetened beverage(s) daily.She exercises with enough effort to increase her heart rate 9 or less minutes per day.  She exercises with enough effort to increase her heart rate 3 or less days per week.   She is taking medications regularly.        Gastroesophageal reflux disease without esophagitis  Chronic kidney disease, stage 3b (H)  Diabetic gastroparesis (H)  Osteopenia of right hip   I spoke to Sammie today over the video visit to discuss options for management of her recently diagnosed osteopenia.  She has been taking vitamin D and this has been stable.  She has never taken an bisphosphonate, but would be willing to consider this.  Her main concern is that she has current gastritis and gastroparesis from diabetes and is actually having symptoms of dyspepsia today.  She does have a history of polio and has had some atrophy and weakness of her right leg.  Strain of left calf muscle   She has noted about 1 week of left calf muscle strain.  She has no redness erythema, but it is a bit swollen.  She has had a previous history of blood clot in her upper extremity, but never her lower extremity.      Review of Systems  Constitutional, HEENT, cardiovascular, pulmonary, gi and gu systems are negative, except as otherwise noted.      Objective           Vitals:  No vitals were obtained today due to virtual  visit.    Physical Exam   GENERAL: alert and no distress  EYES: Eyes grossly normal to inspection.  No discharge or erythema, or obvious scleral/conjunctival abnormalities.  RESP: No audible wheeze, cough, or visible cyanosis.    SKIN: Visible skin clear. No significant rash, abnormal pigmentation or lesions.  NEURO: Cranial nerves grossly intact.  Mentation and speech appropriate for age.  PSYCH: Appropriate affect, tone, and pace of words    (K21.9) Gastroesophageal reflux disease without esophagitis  (primary encounter diagnosis)  Comment: We will refer to medical therapeutic management to start an IV version of zoledronate.  Continue on vitamin D  Plan: Med Therapy Management Referral            (S86.122A) Strain of left calf muscle  Comment: She noted a strain of her left calf muscle and as such I referred [facility] for acute ultrasound to rule out DVT  Plan: Referral to Acute and Diagnostic Services (Day         of diagnostic / First order acute)            (N18.32) Chronic kidney disease, stage 3b (H)  Comment: This was factored into decision to consider IV zoledronate  Plan:     (E11.43,  K31.84) Diabetic gastroparesis (H)  Comment: Also concern for using oral bisphosphonates  Plan:     (M85.851) Osteopenia of right hip  Comment: as above   Plan: Med Therapy Management Referral                 Video-Visit Details    Type of service:  Video Visit   Video Start Time:  12:34  Video End Time:12:51 PM    Originating Location (pt. Location): Home    Distant Location (provider location):  On-site  Platform used for Video Visit: Jenise  Signed Electronically by: Shola Benoit MD, MD

## 2024-03-13 ENCOUNTER — ANCILLARY PROCEDURE (OUTPATIENT)
Dept: ULTRASOUND IMAGING | Facility: CLINIC | Age: 79
End: 2024-03-13
Attending: INTERNAL MEDICINE
Payer: MEDICARE

## 2024-03-13 ENCOUNTER — OFFICE VISIT (OUTPATIENT)
Dept: PEDIATRICS | Facility: CLINIC | Age: 79
End: 2024-03-13
Payer: MEDICARE

## 2024-03-13 VITALS
HEIGHT: 60 IN | OXYGEN SATURATION: 99 % | TEMPERATURE: 97.4 F | BODY MASS INDEX: 40.82 KG/M2 | RESPIRATION RATE: 16 BRPM | HEART RATE: 60 BPM | SYSTOLIC BLOOD PRESSURE: 142 MMHG | WEIGHT: 207.9 LBS | DIASTOLIC BLOOD PRESSURE: 68 MMHG

## 2024-03-13 DIAGNOSIS — L02.419 CELLULITIS AND ABSCESS OF LEG: ICD-10-CM

## 2024-03-13 DIAGNOSIS — M79.89 LEFT LEG SWELLING: Primary | ICD-10-CM

## 2024-03-13 DIAGNOSIS — M79.89 LEFT LEG SWELLING: ICD-10-CM

## 2024-03-13 DIAGNOSIS — L03.119 CELLULITIS AND ABSCESS OF LEG: ICD-10-CM

## 2024-03-13 PROCEDURE — 99214 OFFICE O/P EST MOD 30 MIN: CPT | Performed by: INTERNAL MEDICINE

## 2024-03-13 PROCEDURE — 93971 EXTREMITY STUDY: CPT | Mod: LT

## 2024-03-13 RX ORDER — DOXYCYCLINE 100 MG/1
100 CAPSULE ORAL 2 TIMES DAILY
Qty: 14 CAPSULE | Refills: 0 | Status: SHIPPED | OUTPATIENT
Start: 2024-03-13 | End: 2024-03-20

## 2024-03-13 ASSESSMENT — PAIN SCALES - GENERAL: PAINLEVEL: SEVERE PAIN (7)

## 2024-03-13 NOTE — PROGRESS NOTES
[facility]    Assessment & Plan     Patient comes in with left lower extremity pain and swelling for several days.  This is in the context of known venous stasis and chronic pedal edema but also prior history of upper extremity DVT that was PICC related.    Ultrasound was negative for DVT.  The sensitivity to light touch and prior hospital stay for cellulitis we will treat her for the same.  Signs symptoms of worsening are discussed.    Patient is advised to elevate at home.  The patient does have compression stockings at home which she plans to down.    Left leg swelling    - US Lower Extremity Venous Duplex Left    Cellulitis and abscess of leg    - doxycycline hyclate (VIBRAMYCIN) 100 MG capsule  Dispense: 14 capsule; Refill: 0        36 minutes spent by me on the date of the encounter doing chart review, review of test results, interpretation of tests, patient visit, and documentation       BMI  Estimated body mass index is 40.6 kg/m  as calculated from the following:    Height as of this encounter: 1.524 m (5').    Weight as of this encounter: 94.3 kg (207 lb 14.4 oz).         No follow-ups on file.    Raheem Cochran is a 78 year old, presenting for the following health issues:  Leg Swelling    HPI     Pt presents to [facility] with 1 week L calf pain and swelling in the setting of prior thromboembolic disease.     2 nights ago noted R calf pain, tender to touch and worsening swelling.  Same leg that she had cellulitis in (needed IV abx) a few months ago.      She did have an outpatient procedure on 2/20 (cystoscopy with botox).     She is currently on Keflex for a UTI (will be done today)     Past medical history notable for diabetes (with gastroparesis), PVD/venous stasis (with prior ulcer), and DVT (RUE due to PICC line in the late 1990s).      Evaluation for possible DVT  Onset/Duration: 1 week  Description:       Location: left calf       Redness: YES- does note redness because of cellulitis       Pain:  moderate, 7/10       Warmth: no        Joint swelling YES  Progression of symptoms same  Accompanying signs and symptoms:       Fevers: no        Numbness/tingling/weakness: no        Chest pain/pleurisy: no        Shortness of breath: no   History        Trauma: no         Recent travel/when: no         Previous history of DVT: YES        Recent surgery: YES- Cystoscopy with botox on 02/20/24  Aggravating factors include: tender to touch  Therapies tried and outcome: nothing  Prior surgery on arteries of veins in this area: No              Objective    BP (!) 142/68 (BP Location: Left arm, Patient Position: Sitting, Cuff Size: Adult Regular)   Pulse 60   Temp 97.4  F (36.3  C)   Resp 16   Ht 1.524 m (5')   Wt 94.3 kg (207 lb 14.4 oz)   LMP  (LMP Unknown)   SpO2 99%   BMI 40.60 kg/m    Body mass index is 40.6 kg/m .  Physical Exam   GEN NAD  ENT MMM  CV RRR  CHEST:  CTA B  ABD: obese, +BS  EXT:  Bipedal edema.  AFO brace R.  +++LLE edema and tender to light touch. Not particularly erythematous or warm.              Signed Electronically by: Jorge Luis Diaz MD

## 2024-03-19 ENCOUNTER — TRANSFERRED RECORDS (OUTPATIENT)
Dept: HEALTH INFORMATION MANAGEMENT | Facility: CLINIC | Age: 79
End: 2024-03-19
Payer: MEDICARE

## 2024-03-25 ENCOUNTER — VIRTUAL VISIT (OUTPATIENT)
Dept: ENDOCRINOLOGY | Facility: CLINIC | Age: 79
End: 2024-03-25
Payer: MEDICARE

## 2024-03-25 DIAGNOSIS — E11.9 TYPE 2 DIABETES, HBA1C GOAL < 7% (H): Primary | ICD-10-CM

## 2024-03-25 DIAGNOSIS — C73 PAPILLARY CARCINOMA OF THYROID (H): ICD-10-CM

## 2024-03-25 PROCEDURE — 99214 OFFICE O/P EST MOD 30 MIN: CPT | Mod: 95 | Performed by: PHYSICIAN ASSISTANT

## 2024-03-25 RX ORDER — FAMOTIDINE 40 MG/1
40 TABLET, FILM COATED ORAL EVERY EVENING
Status: ON HOLD | COMMUNITY
Start: 2024-02-29 | End: 2024-08-21

## 2024-03-25 RX ORDER — INSULIN GLARGINE 100 [IU]/ML
25 INJECTION, SOLUTION SUBCUTANEOUS DAILY
COMMUNITY
Start: 2024-03-04 | End: 2024-05-01

## 2024-03-25 NOTE — PROGRESS NOTES
Assessment/Plan :   Type 2 DM with long term use of insulin. Sammie continues to work on managing her blood sugars. We reviewed her recent CGMS report and her blood sugars look okay. I am concerned about some of her recurrent low blood sugars and I would like her to decrease the Basaglar down to 25 unit(s) daily. She will continue with Novolog 10 unit(s) twice daily. We also discussed options for weight loss. She has tried Ozempic in the past. It looks like she was okay on the lower dosing but developed adverse effects on the 1 mg dose. We discussed possibly restarting Ozempic at a low dose. We also discussed increasing Jardiance to 25 mg daily. She will consider both options. We will repeat routine laboratory testing and I will contact her with the results and possible medication adjustments.  Hypothyroidism. Sammie remains stable on 112 mcg of levothyroxine. We did review the signs and symptoms of low and excess thyroid hormone. With her increased leg swelling and slowing of weight loss, I want to make sure that her thyroid level is stable. I will place a lab order today and I will contact her with the results.     Due to the COVID 19 pandemic this visit was a telephone/video visit in order to help prevent spread of infection in this patient and the general population. The patient gave verbal consent for the visit today. I have independently reviewed and interpreted labs, imaging as indicated.       Distant Location (provider location):  On-site  Mode of Communication:  Video Conference via [employer]  Chart review/prep time 5   Joined the call at 3/25/2024, 10:28:29 am.  Left the call at 3/25/2024, 10:51:38 am.  You were on the call for 23 minutes 9 seconds .  32 minutes spent on the date of the encounter doing chart review, history and exam, documentation and further activities as noted above.      Chief complaint:  Chasity is a 78 year old female who returns for a follow-up diabetes and hypothyroidism.    I have  reviewed Care Everywhere including [facility], [facility], [facility], [place], [facility], [facility] , [facility], [place] lab reports, imaging reports and provider notes as indicated.      HISTORY OF PRESENT ILLNESS  Sammie continues to work on managing her blood sugars. Overall, she feels like she is doing well. She continues to take Basaglar 30 unit(s) in the morning. She will occasionally drop the dose to 15 unit(s) if her morning blood sugars are low. She has also continued to take Novolog 10 unit(s) with breakfast and dinner. She also takes Jardiance 10 mg daily. She is monitoring her blood sugars with the Vini 3 sensor. She has been struggling with recurrent low blood sugars throughout the day.    Sammie has not had any recent trouble with severe hyperglycemia and/or hypoglycemia. She has had quite a few readings under 70 mg/dl in the early morning hours. She has even had a few readings into the 50s. She has also had some high blood sugars after dinner. She denies any problems with blurred vision or numbness/tingling in her feet. She has noticed an increase in lower leg swelling. She also continues to struggle with her weight. She had made some improvements to her diet and she was losing weight but she seems to have hit a plateau.    Sammie was diagnosed with diabetes in 2015. She has taken a variety of oral medications over the years along with insulin therapy. 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 She needs to have spinal surgery and she was told that weight loss would help with recovery. She was wondering if there were any diabetes medications that could help with weight loss.    Lastly, she remains stable on 113 mcg of levothyroxine daily. She has not had any problems with worsening anxiousness, irritability or fatigue.     Endocrine relevant labs are as follows:   Latest Reference Range &  Units 02/13/24 10:55   Hemoglobin A1C 0.0 - 5.6 % 6.8 (H)   (H): Data is abnormally high   Latest Reference Range & Units 12/11/23 11:07   TSH 0.30 - 4.20 uIU/mL 2.88      Latest Reference Range & Units 12/11/23 11:07   T4 Free 0.90 - 1.70 ng/dL 2.00 (H)   (H): Data is abnormally high    REVIEW OF SYSTEMS    Endocrine: positive for thyroid disorder and diabetes  Skin: negative  Eyes: negative for, visual blurring, redness, tearing  Ears/Nose/Throat: negative for, hoarseness, feeling of fullness  Respiratory: No shortness of breath, dyspnea on exertion, cough, or hemoptysis  Cardiovascular: positive for lower extremity edema, negative for, chest pain, dyspnea on exertion, and exercise intolerance  Gastrointestinal: negative for, nausea, vomiting, constipation, and diarrhea  Genitourinary: negative for, nocturia, dysuria, frequency, and urgency  Musculoskeletal: negative for, muscular weakness, nocturnal cramping, and foot pain  Neurologic: negative for, numbness or tingling of hands, and numbness or tingling of feet  Psychiatric: negative  Hematologic/Lymphatic/Immunologic: negative    Past Medical History  Past Medical History:   Diagnosis Date    Abdominal adhesions 1984, 96,99    s/p lysis    Abdominal adhesions     Acne rosacea     Allergic rhinitis     Allergic rhinitis, cause unspecified     Alopecia     Anemia     CAD (coronary artery disease)     Carotid stenosis     CKD (chronic kidney disease) stage 2, GFR 60-89 ml/min     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)     CPAP (continuous positive airway pressure) dependence     Depression     Diabetic gastroparesis (H)     Diet-controlled type 2 diabetes mellitus (H)     DM2 (diabetes mellitus, type 2) (H)     DVT (deep venous thrombosis) (H)     DVT of axillary vein, acute right (H)     Fibromyalgia     Gastro-oesophageal reflux disease     GERD (gastroesophageal reflux disease)     Hernia of unspecified site of abdominal cavity without mention of obstruction or gangrene      bilateral    Hernia, abdominal     History of blood transfusion     10/ 1980    History of thrombophlebitis     HTN (hypertension)     HTN (hypertension)     Hypertriglyceridemia     Hypertriglyceridemia     Hypokalemia     Hypothyroidism     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    Mumps     Obstructive sleep apnea     ANNETTE (obstructive sleep apnea)     ANNETTE on CPAP     Osteoarthritis of glenohumeral joint     Papillary carcinoma of thyroid (H)     s/p thyroidectomy - Ruegemer    Papillary carcinoma of thyroid (H)     PE (pulmonary embolism)     Poliomyelitis     poor circulation right leg    Poliomyelitis     Postsurgical hypothyroidism     s/p papillary thryoid cancer - Ruegemer    Pulmonary embolism (H)     Pulmonary embolus (H)     Pulmonary nodule     Rosacea     S/P carpal tunnel release     bilateral    S/P hardware removal 01/2014    still with lingering foot pain    S/P shoulder surgery     bilateral    Septic joint (H)     right knee    Septic joint of right knee joint (H)     Venous insufficiency     Venous insufficiency     Venous thrombosis 1999    right axillary vein       Medications  Current Outpatient Medications   Medication Sig Dispense Refill    acetaminophen (TYLENOL) 500 MG tablet Take 1,000 mg by mouth every 8 hours as needed      acetaminophen-caffeine (EXCEDRIN TENSION HEADACHE) 500-65 MG TABS Take 2 tablets by mouth every 6 hours as needed for mild pain 90 tablet 0    amLODIPine (NORVASC) 2.5 MG tablet Take 1 tablet (2.5 mg) by mouth every evening For blood pressure > 140 90 tablet 3    amoxicillin (AMOXIL) 500 MG capsule Takes 4 caps before every dental appointments.      aspirin (ASA) 81 MG EC tablet Take 81 mg by mouth daily (with lunch)      azelastine (ASTEPRO) 0.15 % nasal spray Spray 1 spray into both nostrils daily as needed      BIOTIN PO Take 1 capsule by mouth at bedtime Unknown dose      blood glucose (NO BRAND SPECIFIED) test strip Use to test blood sugar 1 times daily or as  directed. 100 strip 1    Calcium Carbonate-Vitamin D (CALTRATE 600+D PO) Take 1 tablet by mouth every evening Takes in the afternoon      carvedilol (COREG) 12.5 MG tablet Take 1 tablet (12.5 mg) by mouth 2 times daily (with meals) 180 tablet 3    clotrimazole (LOTRIMIN) 1 % cream Apply topically as needed (rash/yeast infection)      Continuous Blood Gluc Sensor (FREESTYLE BRANDY 3 SENSOR) MISC 1 each every 14 days Change every 14 days 2 each 5    Cyanocobalamin (B-12 PO) Take 1 tablet by mouth every evening Unknown dose. Takes in the afternoon      empagliflozin (JARDIANCE) 10 MG TABS tablet Take 1 tablet (10 mg) by mouth daily (Patient taking differently: Take 10 mg by mouth every evening) 90 tablet 1    ezetimibe (ZETIA) 10 MG tablet Take 1 tablet (10 mg) by mouth daily (Patient taking differently: Take 10 mg by mouth every evening) 90 tablet 3    famotidine (PEPCID) 40 MG tablet       fenofibrate (TRICOR) 145 MG tablet Take 1 tablet (145 mg) by mouth daily (Patient taking differently: Take 145 mg by mouth every morning) 90 tablet 3    fexofenadine (ALLEGRA) 180 MG tablet Take 180 mg by mouth daily (with lunch) Takes in the afternoon 120 0    fish oil-omega-3 fatty acids 500 MG capsule Take by mouth every evening      fluocinolone acetonide (DERMA SMOOTHE/FS BODY) 0.01 % external oil Apply 2 mL to scalp once per week. Massage into scalp. Can be left in overnight or washed out after 4-6 hours. 118.28 mL 5    fluticasone (FLONASE) 50 MCG/ACT spray Spray 2 sprays in nostril daily 2 sprays in each nostril qd (Patient taking differently: Spray 2 sprays in nostril every morning 2 sprays in each nostril qd) 1 Bottle 0    furosemide (LASIX) 20 MG tablet Take 1 tablet (20 mg) by mouth daily as needed (lower extremity edema) 90 tablet 3    icosapent ethyl (VASCEPA) 1 g CAPS capsule Take 1 g by mouth every morning      insulin aspart (NOVOLOG FLEXPEN) 100 UNIT/ML pen Inject 10 Units Subcutaneous 2 times daily (with meals)  Breakfast and supper, pt eats minimal at lunch 15 mL 3    INSULIN GLARGINE 100 UNIT/ML pen       insulin pen needle (B-D U/F) 31G X 5 MM miscellaneous Use 1 pen needles daily or as directed. 90 each 3    levothyroxine (SYNTHROID/LEVOTHROID) 112 MCG tablet Take 1 tablet (112 mcg) by mouth daily (Patient taking differently: Take 112 mcg by mouth every morning) 90 tablet 3    Lidocaine (LIDOCARE) 4 % Patch Place 2-3 patches onto the skin every 24 hours To prevent lidocaine toxicity, patient should be patch free for 12 hrs daily.      minoxidil (LONITEN) 2.5 MG tablet Take half tablet (1.25 mg) by mouth once daily (Patient taking differently: Take 1.25 mg by mouth every morning Take half tablet (1.25 mg) by mouth once daily) 90 tablet 2    MULTIPLE VITAMIN PO Take 1 tablet by mouth daily (with lunch)      nitroGLYcerin (NITROSTAT) 0.4 MG sublingual tablet Place 1 tablet (0.4 mg) under the tongue every 5 minutes as needed for chest pain (no more than 3 in one hour; after 3rd, call 911.) 25 tablet 3    nystatin (MYCOSTATIN) cream Apply topically daily as needed (groin)      nystatin-triamcinolone (MYCOLOG II) cream Apply topically daily as needed (genital itching)      olmesartan (BENICAR) 40 MG tablet Take 1 tablet (40 mg) by mouth daily      ondansetron (ZOFRAN) 4 MG tablet Take 1 tablet (4 mg) by mouth every 6 hours as needed Reported on 3/20/2017 20 tablet 0    order for DME Equipment being ordered: Compression stockings - Knee High; 20-30 mmHg compression - note would like adhesive band to keep the stocking from sliding down 3 each 0    pantoprazole (PROTONIX) 40 MG EC tablet Take 40 mg by mouth 2 times daily      Polyethylene Glycol 400 (BLINK TEARS) 0.25 % GEL Apply to eye At Bedtime      polyethylene glycol 400 (BLINK TEARS) 0.25 % SOLN ophthalmic solution Place 1 drop into both eyes daily      pregabalin (LYRICA) 25 MG capsule Take 1 capsule (25 mg) by mouth 2 times daily 60 capsule 11    sucralfate (CARAFATE) 1  GM/10ML suspension Take 1 g by mouth 4 times daily as needed (GERD)      tretinoin (RETIN-A) 0.025 % external cream Apply topically nightly as needed (facial lesions) Use every night as tolerated - spot treat lesion      triamcinolone (KENALOG) 0.1 % external ointment Apply topically every other day 80 g 3    Vitamin D3 50 mcg (2000 units) tablet Take 1 tablet by mouth every evening Takes in the afternoon         Allergies  Allergies   Allergen Reactions    Ketorolac Tromethamine Difficulty breathing     Shortness of breath to tablets only per the patient    Nsaids Difficulty breathing     Increased creatinine    Celecoxib Itching and Rash    Morphine And Related Itching     With higher doses. Pt denies this allergy 11/16/2023    Codeine Itching    Conjugated Estrogens     Crestor [Rosuvastatin] Muscle Pain (Myalgia)    No Clinical Screening - See Comments Itching     Fragrance    Vioxx Other (See Comments)     Heart races    Sulfa Antibiotics Rash       Family History  family history includes Arthritis in her mother, sister, and sister; Breast Cancer in her sister; Cancer in her maternal grandmother, sister, and sister; Cerebrovascular Disease in her mother; Colon Cancer in her sister; Depression in her sister; Diabetes in her brother, father, and sister; Heart Disease in her brother and mother; Hyperlipidemia in her brother; Hypertension in her brother, father, mother, sister, sister, and sister; Lipids in her brother and sister; Obesity in her maternal grandmother, mother, paternal grandmother, sister, sister, sister, and sister; Osteoporosis in her sister and sister; Other Cancer in her brother; Respiratory in her father; Skin Cancer in her maternal grandmother; Thyroid Disease in her sister and sister.    Social History  Social History     Tobacco Use    Smoking status: Never     Passive exposure: Never    Smokeless tobacco: Never   Vaping Use    Vaping Use: Never used   Substance Use Topics    Alcohol use:  Never    Drug use: Never       Physical Exam  There is no height or weight on file to calculate BMI.  GENERAL: no distress  SKIN: Visible skin clear. No significant rash, abnormal pigmentation or lesions.  EYES: Eyes grossly normal to inspection.  No discharge or erythema, or obvious scleral/conjunctival abnormalities.  NECK: visible goiter is not present; no visible neck masses  RESP: No audible wheeze, cough, or visible cyanosis.  No visible retractions or increased work of breathing.    NEURO: Awake, alert, responds appropriately to questions.  Mentation and speech fluent.  PSYCH:affect normal and appearance well-groomed.      DATA REVIEW  FreeStyle LibreView  Time in target range 69%  Low 1%, High 25%, and Very High 5%  Current Ave  mg/dl

## 2024-03-25 NOTE — LETTER
3/25/2024         RE: Chasity Hodgson  [address]  [facility] [address]        Dear Colleague,    Thank you for referring your patient, Chasity Hodgson, to the [facility]. Please see a copy of my visit note below.    Assessment/Plan :   Type 2 DM with long term use of insulin. Sammie continues to work on managing her blood sugars. We reviewed her recent CGMS report and her blood sugars look okay. I am concerned about some of her recurrent low blood sugars and I would like her to decrease the Basaglar down to 25 unit(s) daily. She will continue with Novolog 10 unit(s) twice daily. We also discussed options for weight loss. She has tried Ozempic in the past. It looks like she was okay on the lower dosing but developed adverse effects on the 1 mg dose. We discussed possibly restarting Ozempic at a low dose. We also discussed increasing Jardiance to 25 mg daily. She will consider both options. We will repeat routine laboratory testing and I will contact her with the results and possible medication adjustments.  Hypothyroidism. Sammie remains stable on 112 mcg of levothyroxine. We did review the signs and symptoms of low and excess thyroid hormone. With her increased leg swelling and slowing of weight loss, I want to make sure that her thyroid level is stable. I will place a lab order today and I will contact her with the results.     Due to the COVID 19 pandemic this visit was a telephone/video visit in order to help prevent spread of infection in this patient and the general population. The patient gave verbal consent for the visit today. I have independently reviewed and interpreted labs, imaging as indicated.       Distant Location (provider location):  On-site  Mode of Communication:  Video Conference via InNetwork  Chart review/prep time 5   Joined the call at 3/25/2024, 10:28:29 am.  Left the call at 3/25/2024, 10:51:38 am.  You were on the call for 23 minutes 9 seconds .  32 minutes  spent on the date of the encounter doing chart review, history and exam, documentation and further activities as noted above.      Chief complaint:  Chasity is a 78 year old female who returns for a follow-up diabetes and hypothyroidism.    I have reviewed Care Everywhere including [facility], [facility], [facility], [facility], [facility], [facility] , [facility], [facility] lab reports, imaging reports and provider notes as indicated.      HISTORY OF PRESENT ILLNESS  Sammie continues to work on managing her blood sugars. Overall, she feels like she is doing well. She continues to take Basaglar 30 unit(s) in the morning. She will occasionally drop the dose to 15 unit(s) if her morning blood sugars are low. She has also continued to take Novolog 10 unit(s) with breakfast and dinner. She also takes Jardiance 10 mg daily. She is monitoring her blood sugars with the Vini 3 sensor. She has been struggling with recurrent low blood sugars throughout the day.    Sammie has not had any recent trouble with severe hyperglycemia and/or hypoglycemia. She has had quite a few readings under 70 mg/dl in the early morning hours. She has even had a few readings into the 50s. She has also had some high blood sugars after dinner. She denies any problems with blurred vision or numbness/tingling in her feet. She has noticed an increase in lower leg swelling. She also continues to struggle with her weight. She had made some improvements to her diet and she was losing weight but she seems to have hit a plateau.    Sammie was diagnosed with diabetes in 2015. She has taken a variety of oral medications over the years along with insulin therapy. 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 She needs to have spinal surgery and she was told that weight loss would help with recovery. She was wondering if there were any diabetes medications that  could help with weight loss.    Lastly, she remains stable on 113 mcg of levothyroxine daily. She has not had any problems with worsening anxiousness, irritability or fatigue.     Endocrine relevant labs are as follows:   Latest Reference Range & Units 02/13/24 10:55   Hemoglobin A1C 0.0 - 5.6 % 6.8 (H)   (H): Data is abnormally high   Latest Reference Range & Units 12/11/23 11:07   TSH 0.30 - 4.20 uIU/mL 2.88      Latest Reference Range & Units 12/11/23 11:07   T4 Free 0.90 - 1.70 ng/dL 2.00 (H)   (H): Data is abnormally high    REVIEW OF SYSTEMS    Endocrine: positive for thyroid disorder and diabetes  Skin: negative  Eyes: negative for, visual blurring, redness, tearing  Ears/Nose/Throat: negative for, hoarseness, feeling of fullness  Respiratory: No shortness of breath, dyspnea on exertion, cough, or hemoptysis  Cardiovascular: positive for lower extremity edema, negative for, chest pain, dyspnea on exertion, and exercise intolerance  Gastrointestinal: negative for, nausea, vomiting, constipation, and diarrhea  Genitourinary: negative for, nocturia, dysuria, frequency, and urgency  Musculoskeletal: negative for, muscular weakness, nocturnal cramping, and foot pain  Neurologic: negative for, numbness or tingling of hands, and numbness or tingling of feet  Psychiatric: negative  Hematologic/Lymphatic/Immunologic: negative    Past Medical History  Past Medical History:   Diagnosis Date     Abdominal adhesions 1984, 96,99    s/p lysis     Abdominal adhesions      Acne rosacea      Allergic rhinitis      Allergic rhinitis, cause unspecified      Alopecia      Anemia      CAD (coronary artery disease)      Carotid stenosis      CKD (chronic kidney disease) stage 2, GFR 60-89 ml/min      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)      CPAP (continuous positive airway pressure) dependence      Depression      Diabetic gastroparesis (H)      Diet-controlled type 2 diabetes mellitus (H)      DM2 (diabetes mellitus, type 2) (H)      DVT (deep  venous thrombosis) (H)      DVT of axillary vein, acute right (H)      Fibromyalgia      Gastro-oesophageal reflux disease      GERD (gastroesophageal reflux disease)      Hernia of unspecified site of abdominal cavity without mention of obstruction or gangrene     bilateral     Hernia, abdominal      History of blood transfusion     10/ 1980     History of thrombophlebitis      HTN (hypertension)      HTN (hypertension)      Hypertriglyceridemia      Hypertriglyceridemia      Hypokalemia      Hypothyroidism      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     Mumps      Obstructive sleep apnea      ANNETTE (obstructive sleep apnea)      ANNETTE on CPAP      Osteoarthritis of glenohumeral joint      Papillary carcinoma of thyroid (H)     s/p thyroidectomy - Ruegemer     Papillary carcinoma of thyroid (H)      PE (pulmonary embolism)      Poliomyelitis     poor circulation right leg     Poliomyelitis      Postsurgical hypothyroidism     s/p papillary thryoid cancer - Ruegemer     Pulmonary embolism (H)      Pulmonary embolus (H)      Pulmonary nodule      Rosacea      S/P carpal tunnel release     bilateral     S/P hardware removal 01/2014    still with lingering foot pain     S/P shoulder surgery     bilateral     Septic joint (H)     right knee     Septic joint of right knee joint (H)      Venous insufficiency      Venous insufficiency      Venous thrombosis 1999    right axillary vein       Medications  Current Outpatient Medications   Medication Sig Dispense Refill     acetaminophen (TYLENOL) 500 MG tablet Take 1,000 mg by mouth every 8 hours as needed       acetaminophen-caffeine (EXCEDRIN TENSION HEADACHE) 500-65 MG TABS Take 2 tablets by mouth every 6 hours as needed for mild pain 90 tablet 0     amLODIPine (NORVASC) 2.5 MG tablet Take 1 tablet (2.5 mg) by mouth every evening For blood pressure > 140 90 tablet 3     amoxicillin (AMOXIL) 500 MG capsule Takes 4 caps before every dental appointments.       aspirin (ASA) 81 MG  EC tablet Take 81 mg by mouth daily (with lunch)       azelastine (ASTEPRO) 0.15 % nasal spray Spray 1 spray into both nostrils daily as needed       BIOTIN PO Take 1 capsule by mouth at bedtime Unknown dose       blood glucose (NO BRAND SPECIFIED) test strip Use to test blood sugar 1 times daily or as directed. 100 strip 1     Calcium Carbonate-Vitamin D (CALTRATE 600+D PO) Take 1 tablet by mouth every evening Takes in the afternoon       carvedilol (COREG) 12.5 MG tablet Take 1 tablet (12.5 mg) by mouth 2 times daily (with meals) 180 tablet 3     clotrimazole (LOTRIMIN) 1 % cream Apply topically as needed (rash/yeast infection)       Continuous Blood Gluc Sensor (Social PlusSTYLE BRANDY 3 SENSOR) MISC 1 each every 14 days Change every 14 days 2 each 5     Cyanocobalamin (B-12 PO) Take 1 tablet by mouth every evening Unknown dose. Takes in the afternoon       empagliflozin (JARDIANCE) 10 MG TABS tablet Take 1 tablet (10 mg) by mouth daily (Patient taking differently: Take 10 mg by mouth every evening) 90 tablet 1     ezetimibe (ZETIA) 10 MG tablet Take 1 tablet (10 mg) by mouth daily (Patient taking differently: Take 10 mg by mouth every evening) 90 tablet 3     famotidine (PEPCID) 40 MG tablet        fenofibrate (TRICOR) 145 MG tablet Take 1 tablet (145 mg) by mouth daily (Patient taking differently: Take 145 mg by mouth every morning) 90 tablet 3     fexofenadine (ALLEGRA) 180 MG tablet Take 180 mg by mouth daily (with lunch) Takes in the afternoon 120 0     fish oil-omega-3 fatty acids 500 MG capsule Take by mouth every evening       fluocinolone acetonide (DERMA SMOOTHE/FS BODY) 0.01 % external oil Apply 2 mL to scalp once per week. Massage into scalp. Can be left in overnight or washed out after 4-6 hours. 118.28 mL 5     fluticasone (FLONASE) 50 MCG/ACT spray Spray 2 sprays in nostril daily 2 sprays in each nostril qd (Patient taking differently: Spray 2 sprays in nostril every morning 2 sprays in each nostril qd) 1  Bottle 0     furosemide (LASIX) 20 MG tablet Take 1 tablet (20 mg) by mouth daily as needed (lower extremity edema) 90 tablet 3     icosapent ethyl (VASCEPA) 1 g CAPS capsule Take 1 g by mouth every morning       insulin aspart (NOVOLOG FLEXPEN) 100 UNIT/ML pen Inject 10 Units Subcutaneous 2 times daily (with meals) Breakfast and supper, pt eats minimal at lunch 15 mL 3     INSULIN GLARGINE 100 UNIT/ML pen        insulin pen needle (B-D U/F) 31G X 5 MM miscellaneous Use 1 pen needles daily or as directed. 90 each 3     levothyroxine (SYNTHROID/LEVOTHROID) 112 MCG tablet Take 1 tablet (112 mcg) by mouth daily (Patient taking differently: Take 112 mcg by mouth every morning) 90 tablet 3     Lidocaine (LIDOCARE) 4 % Patch Place 2-3 patches onto the skin every 24 hours To prevent lidocaine toxicity, patient should be patch free for 12 hrs daily.       minoxidil (LONITEN) 2.5 MG tablet Take half tablet (1.25 mg) by mouth once daily (Patient taking differently: Take 1.25 mg by mouth every morning Take half tablet (1.25 mg) by mouth once daily) 90 tablet 2     MULTIPLE VITAMIN PO Take 1 tablet by mouth daily (with lunch)       nitroGLYcerin (NITROSTAT) 0.4 MG sublingual tablet Place 1 tablet (0.4 mg) under the tongue every 5 minutes as needed for chest pain (no more than 3 in one hour; after 3rd, call 911.) 25 tablet 3     nystatin (MYCOSTATIN) cream Apply topically daily as needed (groin)       nystatin-triamcinolone (MYCOLOG II) cream Apply topically daily as needed (genital itching)       olmesartan (BENICAR) 40 MG tablet Take 1 tablet (40 mg) by mouth daily       ondansetron (ZOFRAN) 4 MG tablet Take 1 tablet (4 mg) by mouth every 6 hours as needed Reported on 3/20/2017 20 tablet 0     order for DME Equipment being ordered: Compression stockings - Knee High; 20-30 mmHg compression - note would like adhesive band to keep the stocking from sliding down 3 each 0     pantoprazole (PROTONIX) 40 MG EC tablet Take 40 mg by  mouth 2 times daily       Polyethylene Glycol 400 (BLINK TEARS) 0.25 % GEL Apply to eye At Bedtime       polyethylene glycol 400 (BLINK TEARS) 0.25 % SOLN ophthalmic solution Place 1 drop into both eyes daily       pregabalin (LYRICA) 25 MG capsule Take 1 capsule (25 mg) by mouth 2 times daily 60 capsule 11     sucralfate (CARAFATE) 1 GM/10ML suspension Take 1 g by mouth 4 times daily as needed (GERD)       tretinoin (RETIN-A) 0.025 % external cream Apply topically nightly as needed (facial lesions) Use every night as tolerated - spot treat lesion       triamcinolone (KENALOG) 0.1 % external ointment Apply topically every other day 80 g 3     Vitamin D3 50 mcg (2000 units) tablet Take 1 tablet by mouth every evening Takes in the afternoon         Allergies  Allergies   Allergen Reactions     Ketorolac Tromethamine Difficulty breathing     Shortness of breath to tablets only per the patient     Nsaids Difficulty breathing     Increased creatinine     Celecoxib Itching and Rash     Morphine And Related Itching     With higher doses. Pt denies this allergy 11/16/2023     Codeine Itching     Conjugated Estrogens      Crestor [Rosuvastatin] Muscle Pain (Myalgia)     No Clinical Screening - See Comments Itching     Fragrance     Vioxx Other (See Comments)     Heart races     Sulfa Antibiotics Rash       Family History  family history includes Arthritis in her mother, sister, and sister; Breast Cancer in her sister; Cancer in her maternal grandmother, sister, and sister; Cerebrovascular Disease in her mother; Colon Cancer in her sister; Depression in her sister; Diabetes in her brother, father, and sister; Heart Disease in her brother and mother; Hyperlipidemia in her brother; Hypertension in her brother, father, mother, sister, sister, and sister; Lipids in her brother and sister; Obesity in her maternal grandmother, mother, paternal grandmother, sister, sister, sister, and sister; Osteoporosis in her sister and sister;  Other Cancer in her brother; Respiratory in her father; Skin Cancer in her maternal grandmother; Thyroid Disease in her sister and sister.    Social History  Social History     Tobacco Use     Smoking status: Never     Passive exposure: Never     Smokeless tobacco: Never   Vaping Use     Vaping Use: Never used   Substance Use Topics     Alcohol use: Never     Drug use: Never       Physical Exam  There is no height or weight on file to calculate BMI.  GENERAL: no distress  SKIN: Visible skin clear. No significant rash, abnormal pigmentation or lesions.  EYES: Eyes grossly normal to inspection.  No discharge or erythema, or obvious scleral/conjunctival abnormalities.  NECK: visible goiter is not present; no visible neck masses  RESP: No audible wheeze, cough, or visible cyanosis.  No visible retractions or increased work of breathing.    NEURO: Awake, alert, responds appropriately to questions.  Mentation and speech fluent.  PSYCH:affect normal and appearance well-groomed.      DATA REVIEW  FreeStyle LibreView  Time in target range 69%  Low 1%, High 25%, and Very High 5%  Current Ave  mg/dl        Again, thank you for allowing me to participate in the care of your patient.        Sincerely,        Yolanda Tapia PA-C

## 2024-03-25 NOTE — PATIENT INSTRUCTIONS
[facility]  Dr Sherwood, Endocrinology Department    [address] [id number]  [place] [address]  Appointment [phone]  Fax: [phone]  [place]: Monday - Thursday

## 2024-03-26 ENCOUNTER — PATIENT OUTREACH (OUTPATIENT)
Dept: CARE COORDINATION | Facility: CLINIC | Age: 79
End: 2024-03-26

## 2024-03-26 NOTE — PROGRESS NOTES
"Clinic Care Coordination Contact  Community Health Worker Follow Up    Care Gaps:     Health Maintenance Due   Topic Date Due    URINE DRUG SCREEN  Never done    RSV VACCINE (Pregnancy & 60+) (1 - 1-dose 60+ series) Never done    ZOSTER IMMUNIZATION (2 of 3) 10/24/2016    MEDICARE ANNUAL WELLNESS VISIT  10/12/2023       Care Gaps Last addressed on 3/26/24-AWV    Care Plan:   Care Plan: Community Resources       Problem: Insufficient In-home support       Note:     Goal Statement: Sammie will continue working with Care Coordination to ensure her needs are met for overall health and wellbeing.  Date Goal Set: 12/5/23  Barriers: Limited finances  Strengths: Strong support system  Date to Achieve By: 12/5/24  Patient expressed understanding of goal: yes  Action steps to achieve this goal:  1. I will continue to follow up with medical team as needed  2. I will work with FRW on discussing and applying for E/W program  4. I will consider options for chore services in my home from CCSW and CHW  5. I will outreach to Care Coordination  for further questions or concerns          Goal: Establish adequate home support       This Visit's Progress: 30% Recent Progress: 10%                        Discussed:  [facility] stated [facility] [place]  Patient stated [facility].  Patient stated her  participates in activities.  Patient stated he did not do this at [facility].  Patient stated she is happy about the move. Patient stated one [facility], there was a puppy party.  The breeder was there with puppies.  Patient stated it was \"fun\".  Patient stated she has been busy doing paperwork for her 's MA renewal.  Patient stated she plans to start on paperwork for herself with [facility].  Patient stated she is going to buy a plastic 5 drawer storage unit to keep the paperwork organized.  Patient " thanked CHW for calling.    Intervention and Education during outreach:     CHW asked patient if there are any resource needs, patient declined.    CHW encouraged patient to contact CC if there are any other changes or resources needed.  Patient acknowledged understanding.      CHW Plan: will outreach in one month.    Nikia Wan, ASHAW  Clinic Care Coordination  [facility]: [place] [place], [place], [facility], and [place] [facility]  Phone: [phone]

## 2024-03-27 DIAGNOSIS — I10 BENIGN ESSENTIAL HYPERTENSION: ICD-10-CM

## 2024-03-27 RX ORDER — CARVEDILOL 12.5 MG/1
12.5 TABLET ORAL 2 TIMES DAILY WITH MEALS
Qty: 180 TABLET | Refills: 3 | OUTPATIENT
Start: 2024-03-27

## 2024-03-27 RX ORDER — CARVEDILOL 12.5 MG/1
12.5 TABLET ORAL 2 TIMES DAILY WITH MEALS
Qty: 180 TABLET | Refills: 4 | Status: SHIPPED | OUTPATIENT
Start: 2024-03-27 | End: 2024-05-09

## 2024-03-28 ENCOUNTER — LAB (OUTPATIENT)
Dept: LAB | Facility: CLINIC | Age: 79
End: 2024-03-28
Payer: MEDICARE

## 2024-03-28 DIAGNOSIS — C73 PAPILLARY CARCINOMA OF THYROID (H): ICD-10-CM

## 2024-03-28 PROCEDURE — 84443 ASSAY THYROID STIM HORMONE: CPT

## 2024-03-28 PROCEDURE — 36415 COLL VENOUS BLD VENIPUNCTURE: CPT

## 2024-03-28 PROCEDURE — 84439 ASSAY OF FREE THYROXINE: CPT

## 2024-03-29 LAB
T4 FREE SERPL-MCNC: 1.8 NG/DL (ref 0.9–1.7)
TSH SERPL DL<=0.005 MIU/L-ACNC: 0.83 UIU/ML (ref 0.3–4.2)

## 2024-04-01 ENCOUNTER — PATIENT OUTREACH (OUTPATIENT)
Dept: CARE COORDINATION | Facility: CLINIC | Age: 79
End: 2024-04-01
Payer: MEDICARE

## 2024-04-02 ENCOUNTER — TRANSFERRED RECORDS (OUTPATIENT)
Dept: HEALTH INFORMATION MANAGEMENT | Facility: CLINIC | Age: 79
End: 2024-04-02
Payer: MEDICARE

## 2024-04-04 ENCOUNTER — MYC MEDICAL ADVICE (OUTPATIENT)
Dept: ENDOCRINOLOGY | Facility: CLINIC | Age: 79
End: 2024-04-04
Payer: MEDICARE

## 2024-04-04 DIAGNOSIS — E78.2 MIXED HYPERLIPIDEMIA: ICD-10-CM

## 2024-04-04 RX ORDER — ICOSAPENT ETHYL 1 G/1
1 CAPSULE ORAL 2 TIMES DAILY WITH MEALS
Qty: 180 CAPSULE | Refills: 0 | Status: SHIPPED | OUTPATIENT
Start: 2024-04-04 | End: 2024-07-03

## 2024-04-05 ENCOUNTER — PATIENT OUTREACH (OUTPATIENT)
Dept: CARE COORDINATION | Facility: CLINIC | Age: 79
End: 2024-04-05
Payer: MEDICARE

## 2024-04-05 NOTE — PROGRESS NOTES
Clinic Care Coordination Contact  Care Coordination Clinician Chart Review    Situation: Patient chart reviewed by Care Coordinator.       Background: Care Coordination Program started: 11/27/2023. Initial assessment completed and patient-centered care plan(s) were developed with participation from patient. Lead CC handed patient off to CHW for continued outreaches.       Assessment: Per chart review, patient outreach completed by CC CHW on 3/26/24.  Patient is actively working to accomplish goal(s). Patient's goal(s) appropriate and relevant at this time. Patient is not due for updated Plan of Care.  Assessments will be completed annually or as needed/with change of patient status.      Care Plan: Community Resources       Problem: Insufficient In-home support       Note:     Goal Statement: Sammie will continue working with Care Coordination to ensure her needs are met for overall health and wellbeing.  Date Goal Set: 12/5/23  Barriers: Limited finances  Strengths: Strong support system  Date to Achieve By: 12/5/24  Patient expressed understanding of goal: yes  Action steps to achieve this goal:  1. I will continue to follow up with medical team as needed  2. I will work with FRW on discussing and applying for E/W program  4. I will consider options for chore services in my home from CCSW and CHW  5. I will outreach to Care Coordination  for further questions or concerns          Goal: Establish adequate home support       This Visit's Progress: 30% Recent Progress: 10%                               Plan/Recommendations: The patient will continue working with Care Coordination to achieve goal(s) as above. CHW will continue outreaches at minimum every 30 days and will involve Lead CC as needed or if patient is ready to move to Maintenance. Lead CC will continue to monitor CHW outreaches and patient's progress to goal(s) every 6 weeks.     Plan of Care updated and sent to patient: Katie Hennessy   [facility]  Clinic Care Coordinator  [facility]  [phone]  [email or web address]

## 2024-04-08 ENCOUNTER — TELEPHONE (OUTPATIENT)
Dept: FAMILY MEDICINE | Facility: CLINIC | Age: 79
End: 2024-04-08
Payer: MEDICARE

## 2024-04-08 NOTE — TELEPHONE ENCOUNTER
Please do not close this encounter until this has been addressed.  (prior auth approved/denied, prescriber refusal to complete prior auth or medication changed/discontinued)    Prior Authorization needed on: icosapent 1mg  Drug NDC: [address]-39     Insurance: medicare part D  Bin:  [id number]  Pcn:  [id number]  Member ID: [id number]   Insurance phone #: [phone]    Pharmacy NPI: [id number]  Pharmacy Phone #: [phone]  Pharmacy Fax #: [phone]    Please let us know if the PA gets approved or denied or if medication is changed    Thanks,  Mansi Patterson CPhT  [facility]  [phone]

## 2024-04-15 ENCOUNTER — VIRTUAL VISIT (OUTPATIENT)
Dept: PHARMACY | Facility: CLINIC | Age: 79
End: 2024-04-15
Attending: INTERNAL MEDICINE
Payer: COMMERCIAL

## 2024-04-15 DIAGNOSIS — K21.9 GASTROESOPHAGEAL REFLUX DISEASE WITHOUT ESOPHAGITIS: ICD-10-CM

## 2024-04-15 DIAGNOSIS — I25.10 CAD (CORONARY ARTERY DISEASE): ICD-10-CM

## 2024-04-15 DIAGNOSIS — L65.9 ALOPECIA: ICD-10-CM

## 2024-04-15 DIAGNOSIS — M85.851 OSTEOPENIA OF RIGHT HIP: ICD-10-CM

## 2024-04-15 DIAGNOSIS — E11.9 TYPE 2 DIABETES, HBA1C GOAL < 7% (H): ICD-10-CM

## 2024-04-15 DIAGNOSIS — Z78.9 TAKES DIETARY SUPPLEMENTS: ICD-10-CM

## 2024-04-15 DIAGNOSIS — I10 BENIGN ESSENTIAL HYPERTENSION: ICD-10-CM

## 2024-04-15 DIAGNOSIS — E78.2 MIXED HYPERLIPIDEMIA: Primary | ICD-10-CM

## 2024-04-15 PROCEDURE — 99207 PR NO CHARGE LOS: CPT | Mod: 93 | Performed by: PHARMACIST

## 2024-04-15 RX ORDER — OMEGA-3-ACID ETHYL ESTERS 1 G/1
2 CAPSULE, LIQUID FILLED ORAL 2 TIMES DAILY
Qty: 120 CAPSULE | Refills: 1 | Status: CANCELLED | OUTPATIENT
Start: 2024-04-15

## 2024-04-15 RX ORDER — PSEUDOEPHEDRINE HCL 30 MG
1 TABLET ORAL AT BEDTIME
COMMUNITY

## 2024-04-15 NOTE — PROGRESS NOTES
Medication Therapy Management (MTM) Encounter    ASSESSMENT:                            Medication Adherence/Access: No issues identified    Osteopenia:   Patient has had basic baseline labs to work up for secondary causes of osteopenia to include creatinine, calcium, vitamin D, TSH, PTH, Alk phos, phosphorous and magnesium. Her last vitamin D level was slightly elevated. Last note from nephrology in February recommended she stay off vitamin D but patient said she was taking. She would benefit from reducing her vitamin D and rechecking level to determine appropriate supplementation. Recommend bisphosphonate for patient who has a 10-year hip fracture probability ?3%. Discussed options with patient including oral alendronate and IV zoledronic acid. Discussed adverse effects of bisphosphonates including esophagitis and GI upset with alendronate and flu like symptoms with zoledronic acid infusion. Also discussed risk of jaw osteonecrosis and femur fracture and symptoms to watch for. Given history of GERD, patient would prefer to avoid alendronate and try IV bisphosphonate. Recommend zoledronic acid 5mg IV once yearly up to 3 years. Current estimated GFR is suitable for infusion, recommend reassessing kidney function prior to infusion. Will route to primary care provider for review and signing of the therapy plan pended.     Supplements:   Discussed importance of adequate vitamin D and calcium intake for prevention of osteoporosis. Recommended increasing dietary calcium intake by adding more milk or other dairy products spread out through the day. Recommended switching current calcium supplement calcium citrate rather than carbonate for improved absorption. Suggested using just calcium versus the combination calcium-vitamin D combination since most recent vitamin D levels were slightly high and patient is already supplementing with vitamin D.    Diabetes:  No changes recommended today. Continue to follow up with  endocrinology.     GERD:  Continue current regimen. Recommend  sucralfate from levothyroxine by as much time as possible. Recommended following up with GI provider regarding breath test for H. Pylori testing.      Hypertension /CAD:  Recommended she schedule blood pressure only check to assess blood pressure control. Continue current regimen for now. With history of suggestive renal artery stenosis, need to be cautious with ACE's and ARBs.  Renal function has been stable on current dose of olmesartan. Recommend she continue to follow up with nephrology.    Hyperlipidemia:  Lipids well controlled with decreasing triglycerides. Informed patient that Vascepa PA is in progress.  If PA is not approved consider switching to Lovaza however this can increase LDL by ~ 44%. Had myalgias in the past on rosuvastatin and atorvastatin. Could consider a moderate intensity statin (pravastatin 40-80 mg) in the future if additional intervention is needed. Continue current regimen for now.    Androgenic alopecia:  Stable.    PLAN:                            For primary care provider to review  Recheck vitamin D at next visit to ensure appropriate supplementation  Therapy plan ordered for IV zoledronic acid - please review and sign    For patient   Recommend IV zoledronic acid 5 mg once yearly for up to 3 years. I will let Dr. Benoit know you agree to this treatment.  Increase milk to two glasses a day and separate from calcium supplement. Continue your vitamin D.  The prior auth team is working on the PA for generic Vascepa. Check back with your clinic end of next week.  Next time you buy calcium try to purchase calcium citrate and don't get the combination product. You are already getting enough vitamin D with your other supplement.    CALCIUM    Recommendations:    Men and Postmenopausal women on estrogen: 1200mg/day     Dietary sources: These also contain vitamin D  Milk                            8 oz            300  mg  Yogurt                          1 cup           400 mg  Hard cheese                     1.5 oz          300 mg  Cottage cheese                  2 cup           300 mg  Orange juice with Calcium       8 oz            300 mg  Low fat dairy sources are recommended     I recommend use of the International Osteoporosis Foundation Calcium Calculator to get an estimate of daily total intake in the diet (www.iofcalciumcalculator).     Vitamin D recommendations: 1000 international units daily    It is best to get your vitamins from your diet and supplement when needed.      Recommend  sucralfate from levothyroxine by as much time as possible, at least a few hours. Take levothyroxine first then sucralfate.   Schedule blood pressure only check at the clinic or pharmacy in the next couple weeks to see if it continues to be high. Schedule follow-up with myself or Dr. Benoit if blood pressure is greater than 140/90.  Follow up with the GI clinic before doing the breath test. Usually you need to be off PPI's like pantoprazole for at least 1 week before test to avoid a false negative result. Please call your GI provider and see he/she wants you to do this.    Follow-up: as needed    SUBJECTIVE/OBJECTIVE:                          Sammie Hodgson is a 78 year old female called for an initial visit. She was referred to me from Dr. Benoit.      Reason for visit: referred to discuss treatment of osteoporosis.    Allergies/ADRs: Reviewed in chart  Past Medical History: Reviewed in chart  Tobacco: She reports that she has never smoked. She has never been exposed to tobacco smoke. She has never used smokeless tobacco.  Alcohol: not currently using    Medication Adherence/Access: no issues reported    Osteopenia:   calcium 600/Vitamin D 800  Vitamin D 2000  Patient is not experiencing side effects.  DEXA History: 3/7/24 FRAX Results: The 10 year probability of major osteoporotic fracture is 13.5%, and of hip fracture is 3.7%,  based on right femoral neck BMD.   Patient is getting milk once or twice a week.  Risk factors: post-menopausal, chronic PPI use, family history of osteoporosis. 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 She does get around with a cane/walker. Her provider is concerned about increased risk of falls. 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     Vitamin D Deficiency Screening Results:  Lab Results   Component Value Date    VITDT 58 (H) 10/02/2023    VITDT 62 06/28/2023    VITDT 64 04/12/2023    VITDT 81 (H) 03/09/2023    VITDT 64 10/12/2022     Lab Results   Component Value Date    RINKU 9.2 01/22/2024     Supplements:   Calcium carbonate 600/Vitamin D 800 once daily  Vitamin D 2000 once daily  Biotin 1 capsule at bedtime  Cyanocobalamin (B12) daily  Multivitamin 1 tablet daily    Patient reports no issues at this time.     Diabetes   Novolog 10 units twice daily before meals  Basaglar 25 units once daily  Jardiance 10 mg once daily  Aspirin 81mg daily for secondary prevention    Blood sugar monitoring: Continuous Glucose Monitor see below  We did not discuss today. She is followed by endocrinology.    Low glucose events: reports will have low readings below 70. Since her glargine was decreased this is happing less.    She drinks diet soda if she drinks any.      Eye exam is up to date  Foot exam is up to date  Urine Albumin:   Lab Results   Component Value Date    UMALCR  03/04/2024      Comment:      Unable to calculate, urine albumin and/or urine creatinine is outside detectable limits.  Microalbuminuria is defined as an albumin:creatinine ratio of 17 to 299 for males and 25 to 299 for females. A ratio of albumin:creatinine of 300 or higher is indicative of overt proteinuria.  Due to biologic variability, positive results should be confirmed by a second, first-morning random or 24-hour timed urine specimen. If there is  discrepancy, a third specimen is recommended. When 2 out of 3 results are in the microalbuminuria range, this is evidence for incipient nephropathy and warrants increased efforts at glucose control, blood pressure control, and institution of therapy with an angiotensin-converting-enzyme (ACE) inhibitor (if the patient can tolerate it).        Lab Results   Component Value Date    A1C 6.8 (H) 02/13/2024       GERD    Pantoprazole 40 mg twice daily  Famotidine 40 mg once daily   Sucralfate 1 gm up to 4 times daily - currently using twice a week    Patient feels that current regimen is effective but if she forgets a dose symptoms return.  It appears she is following with [facility]. Patient reports she has a home breath test to check for H. Pylori which she has not done yet.       Hypertension /CAD  Olmesartan 40 mg once daily  Carvedilol 12.5 mg twice daily  Amlodipine 2.5 mg once daily in the evening if systolic > 140 - she has not used for 1.5-2 weeks  Aspirin 81 mg daily  Furosemide 20 mg once daily as needed - currently using one every couple weeks or so just depends on leg swelling  Nitroglycerin as needed for chest pain but  has not needed    Patient reports  she had some bruising on her arm but they have been improving  Patient self monitors blood pressure.  Home BP monitoring tends to be below goal of 140/90 .       BP Readings from Last 3 Encounters:   03/13/24 (!) 142/68   02/20/24 (!) 155/64   02/16/24 138/72     Pulse Readings from Last 3 Encounters:   03/13/24 60   02/20/24 69   02/16/24 64     Hyperlipidemia   Ezetimibe (Zetia) 10 mg once daily  Fenofibrate 180 mg once daily  Vascepa 1 gm twice daily - she has not been taking this now since PA required and in process    Medication History: has been on rosuvastatin in the past but she had muscle pains. She also had muscle pains on atorvastatin. She is not wanting to start a statin if it is going to cause pains.     Patient reports no significant side effects.      Recent Labs   Lab Test 01/22/24  1051 10/02/23  1304 08/01/17  0000 02/15/17  0000   CHOL 137 160   < > 142   HDL 33* 30*   < > 31   LDL 70 72   < > 49   TRIG 168* 288*   < > 309   CHOLHDLRATIO  --   --   --  4.6    < > = values in this interval not displayed.     Androgenic alopecia:  Minoxidil 2.5 mg - one fourth tablet as needed about every other day    She does feel this causes swelling. Seems to be effective.    Today's Vitals: LMP  (LMP Unknown)   ----------------    I spent 80 minutes with this patient today. All changes were made via collaborative practice agreement with Shola Benoit MD, MD. A copy of the visit note was provided to the patient's provider(s).    A summary of these recommendations was mailed to the patient and was sent via [employer].    Naomi Boles, AnnemarieD  Medication Therapy Management Pharmacist    Yinka Rivera, 4th Year Pharm. D. Student  [facility]    Telemedicine Visit Details  Type of service:  Telephone visit  Start Time: 1:10 PM  End Time: 2:30 PM     Medication Therapy Recommendations  Osteopenia of right hip    Current Medication: Calcium Carbonate-Vitamin D (CALTRATE 600+D PO) (Discontinued)   Rationale: More effective medication available - Ineffective medication - Effectiveness   Recommendation: Change Medication - calcium citrate 250 MG Tabs   Status: Accepted - no CPA Needed          Current Medication: calcium citrate 250 MG TABS   Rationale: Preventive therapy - Needs additional medication therapy - Indication   Recommendation: Start Medication - zoledronic acid 5 MG/100ML Soln infusion   Status: Contact Provider - Awaiting Response

## 2024-04-15 NOTE — PATIENT INSTRUCTIONS
Recommendations from today's MTM visit:                                                    MTM (medication therapy management) is a service provided by a clinical pharmacist designed to help you get the most of out of your medicines.   Today we reviewed what your medicines are for, how to know if they are working, that your medicines are safe and how to make your medicine regimen as easy as possible.      Recommend IV zoledronic acid 5 mg once yearly for up to 3 years. I will let Dr. Benoit know you agree to this treatment.  Increase milk to two glasses a day and separate from calcium supplement. Continue your vitamin D.  The prior auth team is working on the PA for generic Vascepa. Check back with your clinic end of next week.  Next time you buy calcium try to purchase calcium citrate and don't get the combination product. You are already getting enough vitamin D with your other supplement.    CALCIUM    Recommendations:    Men and Postmenopausal women on estrogen: 1200mg/day     Dietary sources: These also contain vitamin D  Milk                            8 oz            300 mg  Yogurt                          1 cup           400 mg  Hard cheese                     1.5 oz          300 mg  Cottage cheese                  2 cup           300 mg  Orange juice with Calcium       8 oz            300 mg  Low fat dairy sources are recommended     I recommend use of the International Osteoporosis Foundation Calcium Calculator to get an estimate of daily total intake in the diet (www.iofcalciumcalculator).     Vitamin D recommendations: 1000 international units daily    It is best to get your vitamins from your diet and supplement when needed.      Recommend  sucralfate from levothyroxine by as much time as possible, at least a few hours. Take levothyroxine first then sucralfate.   Schedule blood pressure only check at the clinic or pharmacy in the next couple weeks to see if it continues to be high. Schedule  "follow-up with myself or Dr. Benoit if blood pressure is greater than 140/90.  Follow up with the GI clinic before doing the breath test. Usually you need to be off PPI's like pantoprazole for at least 1 week before test to avoid a false negative result. Please call your GI provider and see he/she wants you to do this.    Follow-up: as needed    It was great speaking with you today.  I value your experience and would be very thankful for your time in providing feedback in our clinic survey. In the next few days, you may receive an email or text message from [employer] with a link to a survey related to your  clinical pharmacist.\"     To schedule another MTM appointment, please call the clinic directly or you may call the MTM scheduling line at [phone] or toll-free at [phone].     My Clinical Pharmacist's contact information:                                                      Please feel free to contact me with any questions or concerns you have.      Naomi Boles, PharmD  Medication Therapy Management Pharmacist  [facility] - Monday and Wednesday 7:30 - 4:00    "

## 2024-04-16 RX ORDER — ZOLEDRONIC ACID 5 MG/100ML
5 INJECTION, SOLUTION INTRAVENOUS ONCE
Qty: 100 ML | Refills: 0 | Status: CANCELLED | OUTPATIENT
Start: 2024-04-16 | End: 2024-04-16

## 2024-04-18 DIAGNOSIS — M85.851 OSTEOPENIA OF RIGHT HIP: Primary | ICD-10-CM

## 2024-04-18 RX ORDER — ALBUTEROL SULFATE 0.83 MG/ML
2.5 SOLUTION RESPIRATORY (INHALATION)
OUTPATIENT
Start: 2024-04-25

## 2024-04-18 RX ORDER — HEPARIN SODIUM (PORCINE) LOCK FLUSH IV SOLN 100 UNIT/ML 100 UNIT/ML
5 SOLUTION INTRAVENOUS
OUTPATIENT
Start: 2024-04-25

## 2024-04-18 RX ORDER — HEPARIN SODIUM,PORCINE 10 UNIT/ML
5-20 VIAL (ML) INTRAVENOUS DAILY PRN
OUTPATIENT
Start: 2024-04-25

## 2024-04-18 RX ORDER — METHYLPREDNISOLONE SODIUM SUCCINATE 125 MG/2ML
125 INJECTION, POWDER, LYOPHILIZED, FOR SOLUTION INTRAMUSCULAR; INTRAVENOUS
Start: 2024-04-25

## 2024-04-18 RX ORDER — ZOLEDRONIC ACID 5 MG/100ML
5 INJECTION, SOLUTION INTRAVENOUS ONCE
Start: 2024-04-25

## 2024-04-18 RX ORDER — DIPHENHYDRAMINE HYDROCHLORIDE 50 MG/ML
50 INJECTION INTRAMUSCULAR; INTRAVENOUS
Start: 2024-04-25

## 2024-04-18 RX ORDER — MEPERIDINE HYDROCHLORIDE 25 MG/ML
25 INJECTION INTRAMUSCULAR; INTRAVENOUS; SUBCUTANEOUS EVERY 30 MIN PRN
OUTPATIENT
Start: 2024-04-25

## 2024-04-18 RX ORDER — ALBUTEROL SULFATE 90 UG/1
1-2 AEROSOL, METERED RESPIRATORY (INHALATION)
Start: 2024-04-25

## 2024-04-18 RX ORDER — EPINEPHRINE 1 MG/ML
0.3 INJECTION, SOLUTION, CONCENTRATE INTRAVENOUS EVERY 5 MIN PRN
OUTPATIENT
Start: 2024-04-25

## 2024-04-18 NOTE — PROGRESS NOTES
Ordered therapy plan for IV zoledronic acid, see details in last MTM encounter on 4/15/24. Routing to primary care provider to review and sign.    Naomi Boles, PharmD  Medication Therapy Management Pharmacist

## 2024-04-18 NOTE — Clinical Note
I sent you a staff message regarding this as well.  Naomi Boles, PharmD Medication Therapy Management Pharmacist

## 2024-04-20 NOTE — TELEPHONE ENCOUNTER
Retail Pharmacy Prior Authorization Team   Phone: [phone]    PA Initiation    Medication: ICOSAPENT ETHYL 1 G PO CAPS  Insurance Company: [employer] - Phone [phone] Fax [phone]  Pharmacy Filling the Rx: [place], [place] - [address]  Filling Pharmacy Phone: [phone]  Filling Pharmacy Fax:    Start Date: 4/20/2024

## 2024-04-20 NOTE — TELEPHONE ENCOUNTER
Prior Authorization Approval    Medication: ICOSAPENT ETHYL 1 G PO CAPS  Authorization Effective Date: 1/21/2024  Authorization Expiration Date: 4/20/2025  Insurance Company: [employer] - Phone [phone] Fax [phone]  Which Pharmacy is filling the prescription: [facility] [address] [facility]  Pharmacy Notified: YES  Patient Notified: YES (faxed approval letter to pharmacy and notified patient via [employer] message)

## 2024-04-23 ENCOUNTER — NURSE TRIAGE (OUTPATIENT)
Dept: NURSING | Facility: CLINIC | Age: 79
End: 2024-04-23
Payer: MEDICARE

## 2024-04-23 NOTE — TELEPHONE ENCOUNTER
"  Nurse Triage SBAR     Is this a 2nd Level Triage?  YES, LICENSED PRACTITIONER REVIEW IS REQUIRED       Situation: Patient calling with concerns of blood glucose being high  (>300) following drinking of high sugar content-containing diagnostic medium prescribed by GI. It is being used to perform breath test for a small bowel evaluation.     Background: T2DM also being followed by Gastroenterology (outside of [facility], no records available.)    Last seen in clinic: 3/25/24 with Yolanda Tapia in Endocrine     Assessment:  -BG as high as 330 at time of call before trending down following her aspart administration.  -Reports she took 13 units of aspart at 3:30pm.  -She is anxious and very concerned that it continued to climb.  -She endorses that she felt \"tired\" but not weak, confused, or other mental status symptoms.  -Reports pulse felt as if it was racing initiatlly but has checked her BP and HR since, reported to be 135/66 with HR of 69.  -Does not feel especially thirsty or that she has been urinating more.  -Denies breathing difficulty.     Protocol Recommended Disposition:   Home Care     Recommendation:  BG trending down at conclusion of call. Instructed to stay hydrated, take insulin as prescribed, continue to monitor BG as she is already doing. Call back if BG remains elevated despite scheduled insulin, develops N/V, trouble breathing, feeling weak or confused, any other concerns.Patient verbalizes understanding and is amenable to plan.     Routed to provider/care team     Mary SALDIVAR RN  [facility]/Red Flag Triage & Med Refill Team      Reason for Disposition   [1] Blood glucose > 300 mg/dL (16.7 mmol/L) AND [2] uses insulin (e.g., insulin-dependent, all people with type 1 diabetes)    Additional Information   Negative: Unconscious or difficult to awaken   Negative: Acting confused (e.g., disoriented, slurred speech)   Negative: Very weak (e.g., can't stand)   Negative: Sounds like a life-threatening " "emergency to the triager   Negative: [1] Vomiting AND [2] signs of dehydration (e.g., very dry mouth, lightheaded, dark urine)   Negative: [1] Blood glucose > 240 mg/dL (13.3 mmol/L) AND [2] rapid breathing   Negative: Blood glucose > 500 mg/dL (27.8 mmol/L)   Negative: [1] Blood glucose > 240 mg/dL (13.3 mmol/L) AND [2] urine ketones moderate-large (or more than 1+)   Negative: [1] Blood glucose > 240 mg/dL (13.3 mmol/L) AND [2] blood ketones > 1.4 mmol/L   Negative: [1] Blood glucose > 240 mg/dL (13.3 mmol/L) AND [2] vomiting AND [3] unable to check for ketones (in blood or urine)   Negative: [1] New-onset diabetes suspected (e.g., frequent urination, weak, weight loss) AND [2] vomiting or rapid breathing   Negative: Vomiting lasts > 4 hours   Negative: Patient sounds very sick or weak to the triager   Negative: Fever > 100.4 F (38.0 C)   Negative: Blood glucose > 400 mg/dL (22.2 mmol/L)   Negative: [1] Blood glucose > 300 mg/dL (16.7 mmol/L) AND [2] two or more times in a row   Negative: Urine ketones moderate - large (or blood ketones > 1.4 mmol/L)   Negative: [1] Symptoms of high blood sugar (e.g., abnormally thirsty, frequent urination, weight loss) AND [2] not able to test blood glucose AND [3] pregnant   Negative: [1] Caller has URGENT medication or insulin pump question AND [2] triager unable to answer question   Negative: [1] Caller has NON-URGENT medication or insulin pump question AND [2] triager unable to answer question   Negative: [1] Blood glucose > 240 mg/dL (13.3 mmol/L) AND [2] pregnant   Negative: [1] Symptoms of high blood sugar (e.g., abnormally thirsty, frequent urination, weight loss) AND [2] not able to test blood glucose   Negative: New-onset diabetes suspected (e.g., abnormally thirsty, frequent urination, weight loss)    Answer Assessment - Initial Assessment Questions  1. BLOOD GLUCOSE: \"What is your blood glucose level?\"       319-330     2. ONSET: \"When did you check the blood " "glucose?\"      Vini sensor giving current reading at 1600    3. USUAL RANGE: \"What is your glucose level usually?\" (e.g., usual fasting morning value, usual evening value)      Was around 120 this am per patient    4. KETONES: \"Do you check for ketones (urine or blood test strips)?\" If Yes, ask: \"What does the test show now?\"       no    5. TYPE 1 or 2:  \"Do you know what type of diabetes you have?\"  (e.g., Type 1, Type 2, Gestational; doesn't know)       T2DM    6. INSULIN: \"Do you take insulin?\" \"What type of insulin(s) do you use? What is the mode of delivery? (syringe, pen; injection or pump)?\"       Basaglar and Novolog    7. DIABETES PILLS: \"Do you take any pills for your diabetes?\" If Yes, ask: \"Have you missed taking any pills recently?\"      no    8. OTHER SYMPTOMS: \"Do you have any symptoms?\" (e.g., fever, frequent urination, difficulty breathing, dizziness, weakness, vomiting)      Had instance of feeling very sleepy and thought her heart was racing.  Denies thirst, difficulty breathing, weakness, dizziness, n/v or confusion    9. PREGNANCY: \"Is there any chance you are pregnant?\" \"When was your last menstrual period?\"      N/a    Protocols used: Diabetes - High Blood Sugar-A-AH    "

## 2024-05-01 DIAGNOSIS — I10 BENIGN ESSENTIAL HYPERTENSION: ICD-10-CM

## 2024-05-01 DIAGNOSIS — E11.9 TYPE 2 DIABETES, HBA1C GOAL < 7% (H): Primary | ICD-10-CM

## 2024-05-01 RX ORDER — INSULIN GLARGINE 100 [IU]/ML
25 INJECTION, SOLUTION SUBCUTANEOUS DAILY
Qty: 30 ML | Refills: 0 | Status: SHIPPED | OUTPATIENT
Start: 2024-05-01 | End: 2024-07-03

## 2024-05-01 NOTE — TELEPHONE ENCOUNTER
Requested Prescriptions   Pending Prescriptions Disp Refills    INSULIN GLARGINE 100 UNIT/ML pen 15 mL 0     Sig: Inject 25 Units Subcutaneous daily       Insulin Protocol Failed - 5/1/2024 11:04 AM        Failed - Has GFR on file in past 12 months and most recent value is normal        Failed - Medication indicated for associated diagnosis     Medication is associated with one or more of the following diagnoses:   - Type 1 diabetes mellitus  - Type 2 diabetes mellitus  - Diabetic nephropathy; Prophylaxis  - Neuropathy due to diabetes mellitus; Prophylaxis  - Retinopathy due to diabetes mellitus; Prophylaxis  - Diabetes mellitus associated with cystic fibrosis  - Disorder of cardiovascular system; Prophylaxis - Type 1 diabetes mellitus   - Disorder of cardiovascular system; Prophylaxis - Type 2 diabetes mellitus            Failed - Chart Review Required     Review Chart.    Do not approve if insulin is used in a pump.  Instead, direct refill request to the patient's endocrinologist.  If the patient doesn't have an endocrinologist, then send the refill to the patient's PCP for review            Passed - Medication is active on med list        Passed - Recent (6 mo) or future (90 days) visit within the authorizing provider's specialty     The patient must have completed an in-person or virtual visit within the past 6 months or has a future visit scheduled within the next 90 days with the authorizing provider s specialty.  Urgent care and e-visits do not quality as an office visit for this protocol.          Passed - Patient is 18 years of age or older

## 2024-05-02 ENCOUNTER — PATIENT OUTREACH (OUTPATIENT)
Dept: CARE COORDINATION | Facility: CLINIC | Age: 79
End: 2024-05-02
Payer: MEDICARE

## 2024-05-02 RX ORDER — OLMESARTAN MEDOXOMIL 40 MG/1
40 TABLET ORAL DAILY
Qty: 90 TABLET | Refills: 3 | Status: SHIPPED | OUTPATIENT
Start: 2024-05-02 | End: 2024-07-29

## 2024-05-07 ENCOUNTER — TRANSFERRED RECORDS (OUTPATIENT)
Dept: HEALTH INFORMATION MANAGEMENT | Facility: CLINIC | Age: 79
End: 2024-05-07
Payer: MEDICARE

## 2024-05-09 ENCOUNTER — TELEPHONE (OUTPATIENT)
Dept: CARDIOLOGY | Facility: CLINIC | Age: 79
End: 2024-05-09
Payer: MEDICARE

## 2024-05-09 DIAGNOSIS — C73 PAPILLARY CARCINOMA OF THYROID (H): ICD-10-CM

## 2024-05-09 DIAGNOSIS — Z13.6 CARDIOVASCULAR SCREENING; LDL GOAL LESS THAN 130: ICD-10-CM

## 2024-05-09 DIAGNOSIS — I10 BENIGN ESSENTIAL HYPERTENSION: ICD-10-CM

## 2024-05-09 RX ORDER — CARVEDILOL 12.5 MG/1
12.5 TABLET ORAL 2 TIMES DAILY WITH MEALS
Qty: 180 TABLET | Refills: 2 | Status: SHIPPED | OUTPATIENT
Start: 2024-05-09

## 2024-05-09 NOTE — TELEPHONE ENCOUNTER
Requested Prescriptions   Pending Prescriptions Disp Refills    fenofibrate (TRICOR) 145 MG tablet 90 tablet 3     Sig: Take 1 tablet (145 mg) by mouth daily       There is no refill protocol information for this order            Last Written Prescription Date:  9/27/2023  Last Fill Quantity: 90 tablet,  # refills: 3   Last office visit: 9/27/2023 ; last virtual visit: 3/21/2023 with prescribing provider:  Odette Weston MD    Future Office Visit: 7/3/2024

## 2024-05-09 NOTE — TELEPHONE ENCOUNTER
Requested Prescriptions   Pending Prescriptions Disp Refills    levothyroxine (SYNTHROID/LEVOTHROID) 112 MCG tablet 90 tablet 3     Sig: Take 1 tablet (112 mcg) by mouth daily       There is no refill protocol information for this order          Last Written Prescription Date:  11/1/2023  Last Fill Quantity: 90 tablet,  # refills: 3   Last office visit: 9/27/2023 ; last virtual visit: 3/21/2023 with prescribing provider:  Odette Weston MD    Future Office Visit: 7/3/2024

## 2024-05-09 NOTE — TELEPHONE ENCOUNTER
I called Sammie because I received a new refill request  from [facility]  for her Carvedilol. She is switching all her medications to [facility] because she has medicare D now and they want mail order plus she doesn't drive. I will order this for him.

## 2024-05-10 RX ORDER — LEVOTHYROXINE SODIUM 112 UG/1
112 TABLET ORAL DAILY
Qty: 90 TABLET | Refills: 1 | Status: SHIPPED | OUTPATIENT
Start: 2024-05-10

## 2024-05-10 RX ORDER — FENOFIBRATE 145 MG/1
145 TABLET, COATED ORAL DAILY
Qty: 90 TABLET | Refills: 1 | Status: SHIPPED | OUTPATIENT
Start: 2024-05-10 | End: 2024-08-18

## 2024-05-10 NOTE — TELEPHONE ENCOUNTER
Last Written Prescription Date:  11/1/23  Last Fill Quantity: 90,  # refills: 3   Last office visit: 9/27/2023   Future Office Visit:  7/3/24        Requested Prescriptions   Pending Prescriptions Disp Refills    levothyroxine (SYNTHROID/LEVOTHROID) 112 MCG tablet 90 tablet 3     Sig: Take 1 tablet (112 mcg) by mouth daily       Thyroid Protocol Passed - 5/9/2024  4:46 PM        Passed - Patient is 12 years or older        Passed - Recent (12 mo) or future (30 days) visit within the authorizing provider's specialty     The patient must have completed an in-person or virtual visit within the past 12 months or has a future visit scheduled within the next 90 days with the authorizing provider s specialty.  Urgent care and e-visits do not quality as an office visit for this protocol.          Passed - Medication is active on med list        Passed - Medication indicated for associated diagnosis     Medication is associated with one or more of the following diagnoses:  Hypothyroidism  Thyroid stimulating hormone suppression therapy  Thyroid cancer          Passed - Normal TSH on file in past 12 months     Recent Labs   Lab Test 03/28/24  1108   TSH 0.83              Passed - No active pregnancy on record     If patient is pregnant or has had a positive pregnancy test, please check TSH.          Passed - No positive pregnancy test in past 12 months     If patient is pregnant or has had a positive pregnancy test, please check TSH.             Refill request from a different pharmacy, so will send remaining refills. Gina Alvarez RN

## 2024-05-13 ENCOUNTER — MYC MEDICAL ADVICE (OUTPATIENT)
Dept: FAMILY MEDICINE | Facility: CLINIC | Age: 79
End: 2024-05-13
Payer: MEDICARE

## 2024-05-13 DIAGNOSIS — E11.9 TYPE 2 DIABETES, HBA1C GOAL < 7% (H): ICD-10-CM

## 2024-05-13 DIAGNOSIS — I10 BENIGN ESSENTIAL HYPERTENSION: ICD-10-CM

## 2024-05-13 DIAGNOSIS — B35.4 TINEA CORPORIS: Primary | ICD-10-CM

## 2024-05-13 DIAGNOSIS — L30.9 ECZEMA, UNSPECIFIED TYPE: ICD-10-CM

## 2024-05-13 DIAGNOSIS — E78.2 MIXED HYPERLIPIDEMIA: ICD-10-CM

## 2024-05-14 ENCOUNTER — PATIENT OUTREACH (OUTPATIENT)
Dept: CARE COORDINATION | Facility: CLINIC | Age: 79
End: 2024-05-14
Payer: MEDICARE

## 2024-05-14 ENCOUNTER — TRANSFERRED RECORDS (OUTPATIENT)
Dept: HEALTH INFORMATION MANAGEMENT | Facility: CLINIC | Age: 79
End: 2024-05-14

## 2024-05-14 RX ORDER — EZETIMIBE 10 MG/1
10 TABLET ORAL EVERY EVENING
Qty: 90 TABLET | Refills: 3 | Status: SHIPPED | OUTPATIENT
Start: 2024-05-14

## 2024-05-14 NOTE — PROGRESS NOTES
"Clinic Care Coordination Contact  The Community Health Worker called for a Care Coordination outreach to follow up on goals and action steps. Spoke to Patient.    Patient stated she is not available to talk.  Patient stated she has \"a lot\" going on.  Patient requested a call in two weeks.    CHW asked patient if she needs any resources or help from the CC team, patient declined.    CHW will outreach in 10 business days.    HERMANN Hwang  Clinic Care Coordination  [facility] Clinics: [place], [place], [place], and [place] [facility]  Phone: [phone]    "

## 2024-05-15 RX ORDER — TRIAMCINOLONE ACETONIDE 1 MG/G
OINTMENT TOPICAL EVERY OTHER DAY
Qty: 30 G | Refills: 3 | Status: CANCELLED | OUTPATIENT
Start: 2024-05-15

## 2024-05-16 NOTE — TELEPHONE ENCOUNTER
Patient states that she hasn't used the triamcinolone for quite awhile. States that she usually doesn't use twice a day. States that she only uses it once a day for a few days. Patient states that her skin isn't as severe as described in MyChart at this time. States that she wants to have it on hand.    Patient states that she also wants the ketoconazole on hand. Originally was ordered by her dermatologist Dunia Gentile. States that she only uses it if triamcinolone doesn't clear redness up.     Patient is still waiting for the insulin Aspart (Novolog Flex pen) from mail order pharmacy.  ( Was sent out by [facility] yesterday and should be delivered tomorrow.)    Patient states that she has enough for tonight and tomorrow morning. Patient cannot afford to pay out of pocket at a local pharmacy for a whole box but thinks [facility] will dispense 1 pen.     Medications and pharmacy pended.   Meme Neri RN

## 2024-05-17 ENCOUNTER — PATIENT OUTREACH (OUTPATIENT)
Dept: CARE COORDINATION | Facility: CLINIC | Age: 79
End: 2024-05-17
Payer: MEDICARE

## 2024-05-17 RX ORDER — TRIAMCINOLONE ACETONIDE 1 MG/G
CREAM TOPICAL 2 TIMES DAILY
Qty: 15 G | Refills: 1 | Status: SHIPPED | OUTPATIENT
Start: 2024-05-17 | End: 2024-08-18

## 2024-05-17 RX ORDER — KETOCONAZOLE 20 MG/G
CREAM TOPICAL
Qty: 30 G | Refills: 1 | Status: SHIPPED | OUTPATIENT
Start: 2024-05-17 | End: 2024-08-18

## 2024-05-17 RX ORDER — INSULIN ASPART 100 [IU]/ML
10 INJECTION, SOLUTION INTRAVENOUS; SUBCUTANEOUS 2 TIMES DAILY WITH MEALS
Qty: 3 ML | Refills: 0 | Status: SHIPPED | OUTPATIENT
Start: 2024-05-17 | End: 2024-05-21

## 2024-05-17 NOTE — PROGRESS NOTES
Clinic Care Coordination Contact  Care Coordination Clinician Chart Review    Situation: Patient chart reviewed by Care Coordinator.       Background: Care Coordination Program started: 11/27/2023. Initial assessment completed and patient-centered care plan(s) were developed with participation from patient. Lead CC handed patient off to CHW for continued outreaches.       Assessment: Per chart review, patient outreach completed by CC CHW on 5/14/24.  Patient is actively working to accomplish goal(s). Patient's goal(s) appropriate and relevant at this time. Patient is not due for updated Plan of Care.  Assessments will be completed annually or as needed/with change of patient status.      Care Plan: Community Resources       Problem: Insufficient In-home support       Note:     Goal Statement: Sammie will continue working with Care Coordination to ensure her needs are met for overall health and wellbeing.  Date Goal Set: 12/5/23  Barriers: Limited finances  Strengths: Strong support system  Date to Achieve By: 12/5/24  Patient expressed understanding of goal: yes  Action steps to achieve this goal:  1. I will continue to follow up with medical team as needed  2. I will work with FRW on discussing and applying for E/W program  4. I will consider options for chore services in my home from CCSW and CHW  5. I will outreach to Care Coordination  for further questions or concerns          Goal: Establish adequate home support       This Visit's Progress: 30% Recent Progress: 10%                               Plan/Recommendations: The patient will continue working with Care Coordination to achieve goal(s) as above. CHW will continue outreaches at minimum every 30 days and will involve Lead CC as needed or if patient is ready to move to Maintenance. Lead CC will continue to monitor CHW outreaches and patient's progress to goal(s) every 6 weeks.     Plan of Care updated and sent to patient: Yes, via  Carlos [place]to Hennessy,  [facility]  Clinic Care Coordinator  [facility]  [phone]  [email or web address]

## 2024-05-17 NOTE — LETTER
[facility]  Patient Centered Plan of Care  About Me:        Patient Name:  Chasity Brooke    YOB: 1945  Age:         78 year old   [facility] MRN:    [id number] Telephone Information:  Home Phone [phone]   Mobile [phone]       Address:  [address] [address] Email address:  [email or web address]      Emergency Contact(s)    Name Relationship Lgl Grd Work Phone Home Phone Mobile Phone   1. LETICIA GALLEGOS Daughter No  [phone]   2. JOSE BROOKE Son No   [phone]           Primary language:  English     needed? No   [facility]:  [phone] op. 1  Other communication barriers:None    Preferred Method of Communication:  [place]  Current living arrangement: I live in a private home    Mobility Status/ Medical Equipment: Independent w/Device        Health Maintenance  Health Maintenance Reviewed: Due/Overdue   Health Maintenance Due   Topic Date Due    URINE DRUG SCREEN  Never done    RSV VACCINE (Pregnancy & 60+) (1 - 1-dose 60+ series) Never done    ZOSTER IMMUNIZATION (2 of 3) 10/24/2016    MEDICARE ANNUAL WELLNESS VISIT  10/12/2023    COVID-19 Vaccine (7 - 2023-24 season) 02/23/2024    PHQ-9  05/08/2024           My Access Plan  Medical Emergency 911   Primary Clinic Line [facility] [phone]   24 Hour Appointment Line [phone] or  [phone]) (toll-free)   24 Hour Nurse Line [phone] (toll-free)   Preferred Urgent Care [facility], [phone]     Preferred Hospital [facility]  [phone]     Preferred Pharmacy [facility] [place], [place] - [address]     Behavioral Health Crisis Line The National Suicide Prevention Lifeline at 1-588.729.6527 or Text/Call 588           My Care Team Members  Patient Care Team         Relationship Specialty Notifications Start End    Shola Benoit MD PCP -  General Internal Medicine  10/22/12     Phone: [phone] Pager: [phone] Fax: [phone] [address] [place] [facility] [address]    Shola Benoit MD Assigned PCP   10/4/12     Phone: [phone] Pager: [phone] Fax: [phone] [address] [place] [facility] [address]    Renetta Meyer MD MD Dermatology  4/17/15     Phone: [phone] Fax: [phone]         [address] [facility] [address] [facility] [address]    ROMI Roque MD MD INTERNAL MEDICINE - ENDOCRINOLOGY, DIABETES & METABOLISM  2/23/16     Phone: [phone] Fax: [phone]         ENDOCRINE CLINIC [facility] [address] [facility] [address]    Ulises Pantoja MD MD Gastroenterology  9/12/17     Phone: [phone] Fax: [phone]         [facility] [address] [address]    Kishore Ferrera MD MD Orthopedics  2/23/18     Phone: [phone] Fax: [phone]         [address] [address] [facility] [address]    Tara Cornejo RN Diabetes Educator Diabetes Education  8/22/19     Phone: [phone] Fax: [phone]        Catrachita Prather, PharmD Pharmacist Pharmacist  7/26/20     Phone: [phone] Fax: [phone] [address] [facility] [address]    Odette Weston MD MD Endocrinology, Diabetes, and Metabolism  9/13/21     Phone: [phone] Fax: [phone]         [facility] [place] [address]    Odette Weston MD Assigned Endocrinology Provider   10/3/21     Phone: [phone] Fax: [phone]         [facility] [place] [address]    Goltz, Bennett Ezra, PA-C Assigned Neuroscience Provider   9/17/22     Phone: [phone] Fax: [phone] [address] [place] [facility] [address]    Terra Bridges PA-C Physician Assistant Urology  10/14/22     Phone: [phone] Fax: [phone]         [address] [place] [facility] [address]    Amber Chan APRN CNP Assigned Heart  and Vascular Provider   4/8/23     Phone: [phone] Fax: [phone]         [address] [facility] [address]    Marbella Martinez PA-C Physician Assistant Urology  5/23/23     Phone: [phone] Fax: [phone]         [address] [facility] [address]    Yolanda Tapia PA-C Physician Assistant Endocrinology, Diabetes, and Metabolism  6/9/23     Phone: [phone] Fax: [phone]         [address] [place] [address]    Kaelyn Hennessy [facility] Lead Care Coordinator  Admissions 11/27/23     Phone: [phone]         Renetta Meyer MD Assigned Surgical Provider   12/2/23     Phone: [phone] Fax: [phone]         [address] [place] [address]    Nikia Wan CHW Community Health Worker Primary Care - CC Admissions 12/5/23     Phone: [phone]         Lupe Mcrae PA-C Physician Assistant   1/16/24     Phone: [phone] Fax: [phone]         [address] [facility] [address]    Florencia Boles RPH Pharmacist Pharmacist  4/15/24     Phone: [phone] Fax: [phone] [address] [address]    Florencia Boles RPH Assigned MTM Pharmacist   4/23/24     Phone: [phone] Fax: [phone] [address] [address]                My Care Plans  Self Management and Treatment Plan    Care Plan  Care Plan: Community Resources       Problem: Insufficient In-home support       Note:     Goal Statement: Sammie will continue working with Care Coordination to ensure her needs are met for overall health and wellbeing.  Date Goal Set: 12/5/23  Barriers: Limited finances  Strengths: Strong support system  Date to Achieve By: 12/5/24  Patient expressed understanding of goal: yes  Action steps to achieve this goal:  1. I will continue to follow up with medical team as needed  2. I will work with FRW on discussing and applying for E/W program  4. I will consider options for chore services in my home from  CCSW and CHW  5. I will outreach to Care Coordination  for further questions or concerns          Goal: Establish adequate home support       This Visit's Progress: 30% Recent Progress: 10%                            Action Plans on File:                       Advance Care Plans/Directives:   Advanced Care Plan/Directives on file:   Yes    Status of Document(s):   On File and Validated    Advanced Care Plan/Directives Type:   Advanced Directive - On File           My Medical and Care Information  Problem List   Patient Active Problem List   Diagnosis    Low back pain    Mixed hyperlipidemia    Benign essential hypertension    Fibromyalgia    Allergic rhinitis    ANNETTE (obstructive sleep apnea)    Cavovarus deformity of foot    History of DVT (deep vein thrombosis)    Hypokalemia    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)    Anemia due to blood loss, acute    Postsurgical hypothyroidism    Type 2 diabetes, HbA1c goal < 7% (H)    Gastroparesis    Papillary carcinoma of thyroid (H)    Alopecia    Pulmonary nodule    Esophageal reflux    Dermatitis    Acne rosacea    Diabetic gastroparesis (H)    Poliomyelitis    Left knee pain    Carotid stenosis    Right shoulder pain    Type 2 diabetes mellitus with other specified complication (H)    Insufficiency, arterial, peripheral (H24)    Allergic dermatitis due to other chemical product    Normocytic anemia    Morbid obesity with BMI of 40.0-44.9, adult (H)    Mild major depression (H)    Chronic kidney disease, stage 3b (H)    Renal artery stenosis (H24)    Neuropathic pain    Mild major depression (H24)    Venous insufficiency    Avascular necrosis of bone of hip, left (H)    Contact dermatitis    Diabetic nephropathy associated with type 2 diabetes mellitus (H)    DVT of upper extremity (deep vein thrombosis) (H)    Dysphagia    Edema of lower extremity    History of colonic polyps    History of pulmonary embolus (PE)    Iron deficiency anemia    Inguinal pain    Moderate  protein-calorie malnutrition (H24)    Osteoarthritis of left hip    Primary insomnia    Right lower quadrant pain    Status post total shoulder arthroplasty, left    Surgical aftercare, musculoskeletal system    Vitamin D deficiency    Lymphedema    Atypical chest pain    Episodic tension-type headache, not intractable    Blurred vision    Pain of cheek    Abdominal adhesions    Mixed incontinence    OAB (overactive bladder)    Cellulitis of lower extremity, unspecified laterality    Cellulitis    Acute chest pain    Venous stasis ulcer of left lower leg with edema of left lower leg (H)    Post-polio syndrome (H28)    Osteopenia of right hip      Current Medications and Allergies:  See printed Medication Report.    Care Coordination Start Date: 11/27/2023   Frequency of Care Coordination: monthly, more frequently as needed     Form Last Updated: 05/17/2024

## 2024-05-20 ENCOUNTER — MYC MEDICAL ADVICE (OUTPATIENT)
Dept: ENDOCRINOLOGY | Facility: CLINIC | Age: 79
End: 2024-05-20
Payer: MEDICARE

## 2024-05-20 DIAGNOSIS — E11.9 TYPE 2 DIABETES, HBA1C GOAL < 7% (H): ICD-10-CM

## 2024-05-21 ENCOUNTER — TELEPHONE (OUTPATIENT)
Dept: CARDIOLOGY | Facility: CLINIC | Age: 79
End: 2024-05-21
Payer: MEDICARE

## 2024-05-21 RX ORDER — INSULIN ASPART 100 [IU]/ML
10 INJECTION, SOLUTION INTRAVENOUS; SUBCUTANEOUS 2 TIMES DAILY WITH MEALS
Qty: 6 ML | Refills: 5 | Status: SHIPPED | OUTPATIENT
Start: 2024-05-21 | End: 2024-07-03

## 2024-05-21 RX ORDER — INSULIN ASPART 100 [IU]/ML
10 INJECTION, SOLUTION INTRAVENOUS; SUBCUTANEOUS 2 TIMES DAILY WITH MEALS
Qty: 3 ML | Refills: 0 | Status: SHIPPED | OUTPATIENT
Start: 2024-05-21 | End: 2024-06-18

## 2024-05-21 NOTE — TELEPHONE ENCOUNTER
Patient called RN direct line stating on May 14th patient experienced chest pain [place]. Patient stated pain was sharp on the left side of patient chest. Patient stated lasted less than 5 minutes. Patient denied using nitroglycerin. Patient endorses feeling heart race and some lightheadedness. Patient took blood pressure when she got home and it was 127/58 with a heat rate of 63. Patient endorses taking all of her medications as prescribed. Patient instructed to go to the emergency room if another episode occurs. Patient verbalized understanding and in agreement with plan.

## 2024-05-21 NOTE — TELEPHONE ENCOUNTER
"Requested Prescriptions   Pending Prescriptions Disp Refills    insulin aspart (NOVOLOG FLEXPEN) 100 UNIT/ML pen 3 mL 0     Sig: Inject 10 Units Subcutaneous 2 times daily (with meals) Breakfast and supper, pt eats minimal at lunch       Insulin Protocol Failed - 5/20/2024  3:49 PM        Failed - Has GFR on file in past 12 months and most recent value is normal        Failed - Chart Review Required     Review Chart.    Do not approve if insulin is used in a pump.  Instead, direct refill request to the patient's endocrinologist.  If the patient doesn't have an endocrinologist, then send the refill to the patient's PCP for review            Passed - Medication is active on med list        Passed - Recent (6 mo) or future (90 days) visit within the authorizing provider's specialty     The patient must have completed an in-person or virtual visit within the past 6 months or has a future visit scheduled within the next 90 days with the authorizing provider s specialty.  Urgent care and e-visits do not quality as an office visit for this protocol.          Passed - Medication indicated for associated diagnosis     Medication is associated with one or more of the following diagnoses:   - Type 1 diabetes mellitus  - Type 2 diabetes mellitus  - Diabetic nephropathy; Prophylaxis  - Neuropathy due to diabetes mellitus; Prophylaxis  - Retinopathy due to diabetes mellitus; Prophylaxis  - Diabetes mellitus associated with cystic fibrosis  - Disorder of cardiovascular system; Prophylaxis - Type 1 diabetes mellitus   - Disorder of cardiovascular system; Prophylaxis - Type 2 diabetes mellitus            Passed - Patient is 18 years of age or older           Last Written Prescription Date:  5/17/24  Last Fill Quantity: 3mL,  # refills: 0   Last office visit: 9/27/2023 ; last virtual visit: 3/21/2023 with prescribing provider:  Yolanda Tapia PA-C   Future Office Visit: 11/13/24    Per LOV with Yolanda Tapia PA-C (3/25/24)  \"She will " "continue with Novolog 10 unit(s) twice daily\"    Message routed to Dr Weston to review as previous Rx was ordered by PCP     Brayan Bell RN            "

## 2024-05-22 ENCOUNTER — TELEPHONE (OUTPATIENT)
Dept: FAMILY MEDICINE | Facility: CLINIC | Age: 79
End: 2024-05-22
Payer: MEDICARE

## 2024-05-22 NOTE — TELEPHONE ENCOUNTER
Reason for Call:  Appointment Request    Patient requesting this type of appt:  Office visit    Requested provider: Shola Benoit    Reason patient unable to be scheduled: Needs to be scheduled by clinic    When does patient want to be seen/preferred time: 2-3 weeks    Comments:   Pt experiencing extreme night sweats & alternating chills.   Tired and worn out.     Would like to see Dr. Benoit in June to discuss and treat.     Could we send this information to you in [employer] or would you prefer to receive a phone call?:   Patient would prefer a phone call   Okay to leave a detailed message?: Yes at Cell number on file:    Telephone Information:   Mobile [phone]     Call taken on 5/22/2024 at 5:42 PM by Jessica Crespo

## 2024-05-23 ENCOUNTER — MYC MEDICAL ADVICE (OUTPATIENT)
Dept: ENDOCRINOLOGY | Facility: CLINIC | Age: 79
End: 2024-05-23
Payer: MEDICARE

## 2024-05-23 ASSESSMENT — PATIENT HEALTH QUESTIONNAIRE - PHQ9
SUM OF ALL RESPONSES TO PHQ QUESTIONS 1-9: 2
10. IF YOU CHECKED OFF ANY PROBLEMS, HOW DIFFICULT HAVE THESE PROBLEMS MADE IT FOR YOU TO DO YOUR WORK, TAKE CARE OF THINGS AT HOME, OR GET ALONG WITH OTHER PEOPLE: SOMEWHAT DIFFICULT
SUM OF ALL RESPONSES TO PHQ QUESTIONS 1-9: 2

## 2024-05-24 ENCOUNTER — OFFICE VISIT (OUTPATIENT)
Dept: FAMILY MEDICINE | Facility: CLINIC | Age: 79
End: 2024-05-24
Payer: MEDICARE

## 2024-05-24 VITALS
DIASTOLIC BLOOD PRESSURE: 76 MMHG | HEIGHT: 60 IN | BODY MASS INDEX: 41.01 KG/M2 | TEMPERATURE: 97.9 F | WEIGHT: 208.9 LBS | OXYGEN SATURATION: 99 % | RESPIRATION RATE: 16 BRPM | HEART RATE: 57 BPM | SYSTOLIC BLOOD PRESSURE: 151 MMHG

## 2024-05-24 DIAGNOSIS — R61 NIGHT SWEATS: Primary | ICD-10-CM

## 2024-05-24 LAB
BASOPHILS # BLD AUTO: 0 10E3/UL (ref 0–0.2)
BASOPHILS NFR BLD AUTO: 1 %
EOSINOPHIL # BLD AUTO: 0.2 10E3/UL (ref 0–0.7)
EOSINOPHIL NFR BLD AUTO: 3 %
ERYTHROCYTE [DISTWIDTH] IN BLOOD BY AUTOMATED COUNT: 12.7 % (ref 10–15)
HCT VFR BLD AUTO: 35.8 % (ref 35–47)
HGB BLD-MCNC: 11.5 G/DL (ref 11.7–15.7)
IMM GRANULOCYTES # BLD: 0 10E3/UL
IMM GRANULOCYTES NFR BLD: 0 %
LYMPHOCYTES # BLD AUTO: 2.1 10E3/UL (ref 0.8–5.3)
LYMPHOCYTES NFR BLD AUTO: 30 %
MCH RBC QN AUTO: 30 PG (ref 26.5–33)
MCHC RBC AUTO-ENTMCNC: 32.1 G/DL (ref 31.5–36.5)
MCV RBC AUTO: 94 FL (ref 78–100)
MONOCYTES # BLD AUTO: 0.6 10E3/UL (ref 0–1.3)
MONOCYTES NFR BLD AUTO: 8 %
NEUTROPHILS # BLD AUTO: 4 10E3/UL (ref 1.6–8.3)
NEUTROPHILS NFR BLD AUTO: 58 %
PLATELET # BLD AUTO: 208 10E3/UL (ref 150–450)
RBC # BLD AUTO: 3.83 10E6/UL (ref 3.8–5.2)
WBC # BLD AUTO: 7 10E3/UL (ref 4–11)

## 2024-05-24 PROCEDURE — 99213 OFFICE O/P EST LOW 20 MIN: CPT | Performed by: PHYSICIAN ASSISTANT

## 2024-05-24 PROCEDURE — 85025 COMPLETE CBC W/AUTO DIFF WBC: CPT | Performed by: PHYSICIAN ASSISTANT

## 2024-05-24 PROCEDURE — 36415 COLL VENOUS BLD VENIPUNCTURE: CPT | Performed by: PHYSICIAN ASSISTANT

## 2024-05-24 PROCEDURE — 86481 TB AG RESPONSE T-CELL SUSP: CPT | Performed by: PHYSICIAN ASSISTANT

## 2024-05-24 PROCEDURE — 80053 COMPREHEN METABOLIC PANEL: CPT | Performed by: PHYSICIAN ASSISTANT

## 2024-05-24 PROCEDURE — 84443 ASSAY THYROID STIM HORMONE: CPT | Performed by: PHYSICIAN ASSISTANT

## 2024-05-24 RX ORDER — RESPIRATORY SYNCYTIAL VIRUS VACCINE 120MCG/0.5
0.5 KIT INTRAMUSCULAR ONCE
Qty: 1 EACH | Refills: 0 | Status: CANCELLED | OUTPATIENT
Start: 2024-05-24 | End: 2024-05-24

## 2024-05-24 ASSESSMENT — PAIN SCALES - GENERAL: PAINLEVEL: SEVERE PAIN (6)

## 2024-05-24 NOTE — PROGRESS NOTES
Assessment and Plan:     (R61) Night sweats  (primary encounter diagnosis)  Comment: waking with drenching sweats x last few weeks, also notes fatigue, denies fever, itching, weight loss, cough, no palpable LAD on exam, no infectious symptoms   Plan: Quantiferon TB Gold Plus, TSH with free T4         reflex, CBC with platelets and differential,         Comprehensive metabolic panel (BMP + Alb, Alk         Phos, ALT, AST, Total. Bili, TP)        Close follow-up with pcp, scheduled today for June, sooner if worsens     [facility] Same Day Provider         Subjective   Sammie is a 78 year old, presenting for the following health issues:  No chief complaint on file.    History of Present Illness       Reason for visit:  Very bad fatigue and very bad night sweets. Wake up often at night feeling very warm, kick off all my blankets, then I get cold and pull them back on.  I wake up in the morning feeling very sweaty and many days my pajamas feel wet. Only sleep 5 to 6 hrs.  Symptom onset:  More than a month  Symptoms include:  Feel tired, my skin feels hot afternoon and evening, not feeling well. Have stomach issues.  Symptom intensity:  Severe  Symptom progression:  Worsening  Had these symptoms before:  No  What makes it worse:  Not sure  What makes it better:  No    She eats 2-3 servings of fruits and vegetables daily.She consumes 0 sweetened beverage(s) daily.She exercises with enough effort to increase her heart rate 10 to 19 minutes per day.  She exercises with enough effort to increase her heart rate 4 days per week.   She is taking medications regularly.    Sammie is seeing me for night sweats and fatigue x last few weeks  She will wake up at night and feel sweaty with soaked night shirt, happens every night  She tends to start feeling warm at night but when she checks her temp it is normal     She has also had intermittent abdominal pain and nausea x 6 months  She is following with Dr. Pantoja  for this   She had negative h pylori breath test and negative upper GI series per patient   She has upper endoscopy scheduled in June     She denies unintended weight loss  She denies itching     She has had mild cough off and on for 1-2 weeks  She hasn't had any hemoptysis   She otherwise denies URI symptoms  She denies dysuria, frequency     No new medications           Objective    LMP  (LMP Unknown)   There is no height or weight on file to calculate BMI.    BP (!) 151/76 (BP Location: Right arm)   Pulse 57   Temp 97.9  F (36.6  C) (Tympanic)   Resp 16   Ht 1.524 m (5')   Wt 94.8 kg (208 lb 14.4 oz)   LMP  (LMP Unknown)   SpO2 99%   BMI 40.80 kg/m          Physical Exam       GENERAL: in NAD, appears well  ENT: mmm, op clear, neck supple, no palpable LAD at neck or supraclavicular areas  RESP: lungs clear to auscultation - no rales, no rhonchi, no wheezes  CV: regular rates and rhythm, normal S1 S2, no S3 or S4 and no murmur, no click or rub   ABD: soft, diffuse mild tenderness, no palpable HSM, no guarding   MS: extremities- no gross deformities noted, chronic lower ext edema  Lymph: no palpable LAD at neck, clavicle, axilla, supratroch          Signed Electronically by: Maude Ortiz PA-C

## 2024-05-24 NOTE — TELEPHONE ENCOUNTER
Patient called in response to [employer] message sent.  Patient was given 1 pen by Dr Benoit on 5/17/24.  She did  this pen from [facility].  In the meantime [facility]  They ran the insurance thru and she can get 1 box of pens on 5/26/24 with a delivery date on 5/29.  She states she went with [facility] the 1 time as they will split up a box and  [facility] would not.  In regards to the the question regarding the plunger placement, per the pharmacist at  [facility], the patient should have 150units left in the pen. Patient is advised of this.  She is aware to call  [facility] with any further questions.     Evelina Kenney RN

## 2024-05-24 NOTE — RESULT ENCOUNTER NOTE
Gerard Cochran,     Your blood counts show stable anemia.  I'll let you know when I see the rest of your results.     Please let us know if you have any questions or concerns.    Regards,  Maude Ortiz PA-C

## 2024-05-25 LAB
ALBUMIN SERPL BCG-MCNC: 4.1 G/DL (ref 3.5–5.2)
ALP SERPL-CCNC: 46 U/L (ref 40–150)
ALT SERPL W P-5'-P-CCNC: 20 U/L (ref 0–50)
ANION GAP SERPL CALCULATED.3IONS-SCNC: 12 MMOL/L (ref 7–15)
AST SERPL W P-5'-P-CCNC: 27 U/L (ref 0–45)
BILIRUB SERPL-MCNC: 0.3 MG/DL
BUN SERPL-MCNC: 33.8 MG/DL (ref 8–23)
CALCIUM SERPL-MCNC: 9.2 MG/DL (ref 8.8–10.2)
CHLORIDE SERPL-SCNC: 106 MMOL/L (ref 98–107)
CREAT SERPL-MCNC: 1.5 MG/DL (ref 0.51–0.95)
DEPRECATED HCO3 PLAS-SCNC: 24 MMOL/L (ref 22–29)
EGFRCR SERPLBLD CKD-EPI 2021: 35 ML/MIN/1.73M2
GLUCOSE SERPL-MCNC: 152 MG/DL (ref 70–99)
POTASSIUM SERPL-SCNC: 4.6 MMOL/L (ref 3.4–5.3)
PROT SERPL-MCNC: 6.5 G/DL (ref 6.4–8.3)
SODIUM SERPL-SCNC: 142 MMOL/L (ref 135–145)
TSH SERPL DL<=0.005 MIU/L-ACNC: 0.77 UIU/ML (ref 0.3–4.2)

## 2024-05-26 LAB
GAMMA INTERFERON BACKGROUND BLD IA-ACNC: 0 IU/ML
M TB IFN-G BLD-IMP: NEGATIVE
M TB IFN-G CD4+ BCKGRND COR BLD-ACNC: 4.87 IU/ML
MITOGEN IGNF BCKGRD COR BLD-ACNC: 0 IU/ML
MITOGEN IGNF BCKGRD COR BLD-ACNC: 0.03 IU/ML
QUANTIFERON MITOGEN: 4.87 IU/ML
QUANTIFERON NIL TUBE: 0 IU/ML
QUANTIFERON TB1 TUBE: 0 IU/ML
QUANTIFERON TB2 TUBE: 0.03

## 2024-05-28 ENCOUNTER — NURSE TRIAGE (OUTPATIENT)
Dept: NURSING | Facility: CLINIC | Age: 79
End: 2024-05-28
Payer: MEDICARE

## 2024-05-28 ENCOUNTER — MYC MEDICAL ADVICE (OUTPATIENT)
Dept: ENDOCRINOLOGY | Facility: CLINIC | Age: 79
End: 2024-05-28
Payer: MEDICARE

## 2024-05-28 NOTE — RESULT ENCOUNTER NOTE
Gerard Cochran,     Here are the rest of your labs: your thyroid stimulating hormone was normal, your TB test was negative, your metabolic panel shows a dip in your kidney function.  Please try to stay hydrated by drinking plenty of water (6-8 glasses).  I think you should recheck this value when you see Dr. Benoit in June.     Please let us know if you have any questions or concerns.    Regards,  Maude Ortiz PA-C

## 2024-05-28 NOTE — TELEPHONE ENCOUNTER
Nurse Triage SBAR    Is this a 2nd Level Triage? YES, LICENSED PRACTITIONER REVIEW IS REQUIRED    Situation: Patient calling with lower than normal glucose readings since Friday.     Background: type 2 diabetic. Takes Jardance 10 mg daily, Novolog flexpen 10 units BID and Glargine 25 units every morning. Has a Freestyle daryn 3 with alarm sensors for high and low glucose.   She states she has not changed her eating habits. She usually gets up and eats around 10 am, has a snack for lunch and then eats dinner. Glucose usually 120-175 but has been much lower. She has gone through 54 oz of orange juice this weekend by drinking 6 oz for lows. States it looks like it is dropping every 4-6 hours. She only took 8 units of novolog yesterday morning and none in the evening. Usually her hard candy and crackers will bring the glucose up to 145-150 for a bit. She has no symptoms of low blood sugar. In the past she had felt warm and dizzy. Glucose at 2:30 was 69.  Last glucose was 53 at 3:30 pm.     Assessment: lower glucose readings over last several days    Protocol Recommended Disposition:   Discuss With PCP And Callback By Nurse Today    Recommendation: Patient will go and have juice and peanut butter with bread now. Please call her back with recommendations.      Routed to provider/team  BON Donovan RN  P Red Flag Triage/MRT       Reason for Disposition   Morning (before breakfast) blood glucose < 80 mg/dL (4.4 mmol/L) and more than once in past week   Evening (after bedtime snack) blood glucose < 100 mg/dL (5.6 mmol/L) and more than once in past week    Additional Information   Negative: Unconscious or difficult to awaken   Negative: Seizure occurs   Negative: Acting confused (e.g., disoriented, slurred speech)   Negative: Very weak (can't stand)   Negative: Sounds like a life-threatening emergency to the triager   Negative: Vomiting and signs of dehydration (e.g., very dry mouth, lightheaded,  "dark urine, etc.)   Negative: Low blood sugar symptoms persist > 30 minutes AND using low blood sugar Care Advice   Negative: Low blood glucose (70 mg/dl [3.9 mmol/l] or below) persists > 30 minutes AND using low blood sugar Care Advice   Negative: Patient sounds very sick or weak to the triager   Negative: Diabetes medication overdose (e.g., insulin error) and triager unable to answer question   Negative: Caller has URGENT medication or insulin pump question and triager unable to answer question   Negative: Low blood sugar symptoms with no other adult present AND hasn't tried Care Advice   Negative: Low blood glucose (70 mg/dl [3.9 mmol/l] or below) with no other adult present AND hasn't tried Care Advice   Negative: Low blood glucose (70 mg/dl [3.9 mmol/l] or below) OR symptomatic, now improved with Care Advice AND cause unknown   Negative: Patient wants to be seen    Answer Assessment - Initial Assessment Questions  1. SYMPTOMS: \"What symptoms are you concerned about?\"      none  2. ONSET:  \"When did the symptoms start?\"      none  3. BLOOD GLUCOSE: \"What is your blood glucose level?\"       53  4. USUAL RANGE: \"What is your blood glucose level usually?\" (e.g., usual fasting morning value, usual evening value)      120-175  5. TYPE 1 or 2:  \"Do you know what type of diabetes you have?\"  (e.g., Type 1, Type 2, Gestational; doesn't know)       Type 2  6. INSULIN: \"Do you take insulin?\" \"What type of insulin(s) do you use? What is the mode of delivery? (syringe, pen; injection or pump) \"When did you last give yourself an insulin dose?\" (i.e., time or hours/minutes ago) \"How much did you give?\" (i.e., how many units)      Insulin novolog 10 unit(s) BID, Glargine 25 units mid morning  7. DIABETES PILLS: \"Do you take any pills for your diabetes?\" If Yes, ask: \"What is the name of the medicine(s) that you take for high blood sugar?\"      jardiance  8. OTHER SYMPTOMS: \"Do you have any symptoms?\" (e.g., fever, frequent " "urination, difficulty breathing, vomiting)      Blurred? For a while now, night sweats for 1-2 weeks  9. LOW BLOOD GLUCOSE TREATMENT: \"What have you done so far to treat the low blood glucose level?\"      Hard candy or other carbs  10. FOOD: \"When did you last eat or drink?\"        Hard candy  11. ALONE: \"Are you alone right now or is someone with you?\"         alone  12. PREGNANCY: \"Is there any chance you are pregnant?\" \"When was your last menstrual period?\"        na    Protocols used: Diabetes - Low Blood Sugar-A-OH    "

## 2024-05-30 ENCOUNTER — TRANSFERRED RECORDS (OUTPATIENT)
Dept: HEALTH INFORMATION MANAGEMENT | Facility: CLINIC | Age: 79
End: 2024-05-30
Payer: MEDICARE

## 2024-05-30 NOTE — TELEPHONE ENCOUNTER
She has an N30i mask. Her nose itches with it. Synopsis shows a high mask leak. She wakes with a somewhat dry mouth, but it is not that bad.  She had tried a full face mask in the past and hated it.     She sleeps supine but with the head elevated. She feels she can't breathe well flat. It hurts her hip to sleep on her sides.    I will have her try the P30i cushion to see if that reduces mask leak.  I also recommended that she try adjusting the humidity settings to help reduce the runny nose the next day.  I advised her to give it maybe 2 or 3 more weeks to see if these adjustments make it more tolerable.  If she cannot get used to it, she can return machine.  Her AHI was only 8/h and she did not have significant desaturations.  I was hoping that we might get junior and she would take to it well and maybe see some improvements in her blood pressure.  At this point she is just more bothered by it than anything.  I do not think it is highly necessary to continue to burden her with this device.  Bennett Goltz, PA-C        The catheter was inserted into the ostium   left main. An angiography was performed of the left coronary arteries. Multiple views were taken.

## 2024-06-03 ENCOUNTER — PATIENT OUTREACH (OUTPATIENT)
Dept: CARE COORDINATION | Facility: CLINIC | Age: 79
End: 2024-06-03
Payer: MEDICARE

## 2024-06-03 NOTE — PROGRESS NOTES
Clinic Care Coordination Contact  Community Health Worker Follow Up    Care Gaps:     Health Maintenance Due   Topic Date Due    URINE DRUG SCREEN  Never done    RSV VACCINE (Pregnancy & 60+) (1 - 1-dose 60+ series) Never done    ZOSTER IMMUNIZATION (2 of 3) 10/24/2016    MEDICARE ANNUAL WELLNESS VISIT  10/12/2023    COVID-19 Vaccine (7 - 2023-24 season) 02/23/2024       Did Not Address    Care Plan:   Care Plan: Community Resources       Problem: Insufficient In-home support       Note:     Goal Statement: Sammie will continue working with Care Coordination to ensure her needs are met for overall health and wellbeing.  Date Goal Set: 12/5/23  Barriers: Limited finances  Strengths: Strong support system  Date to Achieve By: 12/5/24  Patient expressed understanding of goal: yes  Action steps to achieve this goal:  1. I will continue to follow up with medical team as needed  2. I will work with FRW on discussing and applying for E/W program  4. I will consider options for chore services in my home from CCSW and CHW  5. I will outreach to Care Coordination  for further questions or concerns          Goal: Establish adequate home support       This Visit's Progress: 40% Recent Progress: 30%                        Discussed:  Patient stated she has been having difficulty changing her medications to a mail-order pharmacy.  Patient stated she did not complete the [facility] paperwork in time.  Patient stated she plans to complete the forms and send back to the [facility] in the near future.  Patient stated her  has some upcoming appointments with oncology and dermatology. Patient stated her  has a large lump on his shoulder.  Her  had a biopsy.  Patient thanked CHW for calling.    Intervention and Education during outreach:     CHW encouraged patient to contact CC if there are any other changes or resources needed.  Patient acknowledged understanding.        CHW Plan: will outreach in one  month.    Nikia Wan, JOSE L  Clinic Care Coordination  [facility]: [facility], [place], [place], and [facility]  Phone: [phone]

## 2024-06-04 ENCOUNTER — TELEPHONE (OUTPATIENT)
Dept: FAMILY MEDICINE | Facility: CLINIC | Age: 79
End: 2024-06-04
Payer: MEDICARE

## 2024-06-04 DIAGNOSIS — E11.9 TYPE 2 DIABETES, HBA1C GOAL < 7% (H): ICD-10-CM

## 2024-06-04 RX ORDER — BLOOD-GLUCOSE SENSOR
1 EACH MISCELLANEOUS
Qty: 6 EACH | Refills: 0 | Status: SHIPPED | OUTPATIENT
Start: 2024-06-04 | End: 2024-07-03

## 2024-06-04 NOTE — TELEPHONE ENCOUNTER
The order for Vini sensors was updated from #2 + 2 refills to  #6 + 0 refill, due to Medicare B compliance guidelines (medicare will now sometimes pay for 3 months at a time).  A new Rx was sent to pharmacy and order was updated in Epic.  Per Prescription Modification CPA

## 2024-06-12 ENCOUNTER — TRANSFERRED RECORDS (OUTPATIENT)
Dept: HEALTH INFORMATION MANAGEMENT | Facility: CLINIC | Age: 79
End: 2024-06-12
Payer: MEDICARE

## 2024-06-18 ENCOUNTER — OFFICE VISIT (OUTPATIENT)
Dept: SURGERY | Facility: CLINIC | Age: 79
End: 2024-06-18
Payer: MEDICARE

## 2024-06-18 VITALS
SYSTOLIC BLOOD PRESSURE: 124 MMHG | BODY MASS INDEX: 40.84 KG/M2 | DIASTOLIC BLOOD PRESSURE: 86 MMHG | WEIGHT: 208 LBS | HEIGHT: 60 IN | OXYGEN SATURATION: 99 % | HEART RATE: 66 BPM

## 2024-06-18 DIAGNOSIS — R19.00 ABDOMINAL WALL BULGE: Primary | ICD-10-CM

## 2024-06-18 PROCEDURE — 99203 OFFICE O/P NEW LOW 30 MIN: CPT | Performed by: SURGERY

## 2024-06-18 NOTE — LETTER
June 19, 2024          Ulises Pantoja MD  [facility]  [address] [place] [address]      RE:   Chasity Hodgson 1945      Dear Colleague,    Thank you for referring your patient, Chasity Hodgson, to [facility] at [facility]. Please see a copy of my visit note below.    HPI:  Chasity is a 78 year old female who presents for evaluation of a central abdominal bulge.  This is in the site of an old midline incision.  She states it causes some mild discomfort and changes in size a bit.  She is concerned she might have a hernia.  This lump has been there for the last 5 to 6 years.    Past Medical History:   has a past medical history of Abdominal adhesions (1984, 96,99), Abdominal adhesions, Acne rosacea, Allergic rhinitis, Allergic rhinitis, cause unspecified, Alopecia, Anemia, CAD (coronary artery disease), Carotid stenosis, CKD (chronic kidney disease) stage 2, GFR 60-89 ml/min, 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), CPAP (continuous positive airway pressure) dependence, Depression, Diabetic gastroparesis (H), Diet-controlled type 2 diabetes mellitus (H), DM2 (diabetes mellitus, type 2) (H), DVT (deep venous thrombosis) (H), DVT of axillary vein, acute right (H), Fibromyalgia, Gastro-oesophageal reflux disease, GERD (gastroesophageal reflux disease), Hernia of unspecified site of abdominal cavity without mention of obstruction or gangrene, Hernia, abdominal, History of blood transfusion, History of thrombophlebitis, HTN (hypertension), HTN (hypertension), Hypertriglyceridemia, Hypertriglyceridemia, Hypokalemia, Hypothyroidism, 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      ROS:  The 10 point review of systems is negative other than noted in the HPI and above.    PE:    General- Well-developed, well-nourished patient.  HEENT- Normocephalic and atraumatic. Pupils equal and round.  Mucous membranes moist.  Sclera are nonicteric.  Neck- No lymphadenopathy or masses   Respirations- are regular and non labored  Abdomen is abdomen is soft without significant tenderness, masses, organomegaly or guarding.  A stoma is present in the left lower quadrant.  The midline incision shows an area of subtle bulging which does not seem to change with Valsalva.  There is no palpable defect.  This appears to be within the abdominal wall scar tissue.    Review of the patient's CT scan from about a year ago reveals no evidence of a hernia in this area.            Assessment/Plan: Central abdominal bulge, likely of superficial fatty tissue.  I do not see any evidence, clinically or on the CT scan from last year, of a hernia here.  We discussed the possibility of further imaging, but I think this is very unlikely to show anything.  There is some broadening of the scar tissue in this area and I think this simply allows some of the fatty tissue to look more prominent.  If the patient notices any increase in the area or worsening symptoms, she is welcome to come back and see me.  At this point, I would recommend long-term simple observation.    Again, thank you for allowing me to participate in the care of your patient.      Sincerely,      MD JONATHAN Agarwal/jamar      D: 6/19/24  T: 8:10 am

## 2024-06-18 NOTE — PROGRESS NOTES
HPI:  Chasity is a 78 year old female who presents for evaluation of a central abdominal bulge.  This is in the site of an old midline incision.  She states it causes some mild discomfort and changes in size a bit.  She is concerned she might have a hernia.  This lump has been there for the last 5 to 6 years.    Past Medical History:   has a past medical history of Abdominal adhesions (1984, 96,99), Abdominal adhesions, Acne rosacea, Allergic rhinitis, Allergic rhinitis, cause unspecified, Alopecia, Anemia, CAD (coronary artery disease), Carotid stenosis, CKD (chronic kidney disease) stage 2, GFR 60-89 ml/min, 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), CPAP (continuous positive airway pressure) dependence, Depression, Diabetic gastroparesis (H), Diet-controlled type 2 diabetes mellitus (H), DM2 (diabetes mellitus, type 2) (H), DVT (deep venous thrombosis) (H), DVT of axillary vein, acute right (H), Fibromyalgia, Gastro-oesophageal reflux disease, GERD (gastroesophageal reflux disease), Hernia of unspecified site of abdominal cavity without mention of obstruction or gangrene, Hernia, abdominal, History of blood transfusion, History of thrombophlebitis, HTN (hypertension), HTN (hypertension), Hypertriglyceridemia, Hypertriglyceridemia, Hypokalemia, Hypothyroidism, 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    Past Surgical History:  Past Surgical History:   Procedure Laterality Date    AMPUTATE  TOE(S)  03/15/2012    Procedure:AMPUTATE TOE(S); Surgeon:RUBEN MOSES; Location: OR    AMPUTATE TOE(S)      APPENDECTOMY  1972    APPENDECTOMY      ARTHRODESIS FOOT  03/15/2012    Procedure:ARTHRODESIS FOOT; RIGHT TRIPLE ARTHRODESIS, FIFTH TOE AMPUTATION, LATERAL LIGAMENT RECONSTRUCTION, TENDON TRANSFER AND RELEASE [MINI C-ARM, ARTHREX 4.5 AND 6.7 STAPLES, BIOCOMPOSITE TENODESIS SCREWS]; Surgeon:RUBEN MOSES; Location: OR    ARTHRODESIS FOOT Right     Right foot triple arthrodesis and removal of hardware    ARTHROSCOPY SHOULDER  06/25/2015    REVISION SUBACROMIAL DECOMPRESSION, EXCISION OF GANGLION CYST, DEBRIDEMENT AND EXCISION OF THE GLENOHUMERAL JOINT GANGLION CYST, CORACOID DECOMPRESSION, POSSIBLE SUBSCAPULARIS REPAIR AND OPEN SUBSCAPULARIS BICEP    ARTHROSCOPY SHOULDER ROTATOR CUFF REPAIR      BIOPSY  Thyroid 2002    BLADDER SURGERY      BOTOX INJECTION MEDICAL      BREAST SURGERY  Biopsy    CHOLECYSTECTOMY      CHOLECYSTECTOMY      COLONOSCOPY  2018    COLOSTOMY  02/07/2012    Procedure:COLOSTOMY; CREATION OF SIGMOID COLOSTOMY AND EXTENSIVE  LYSIS OF ADHESIONS; Surgeon:MONTSERRAT BENDER; Location: OR    COLOSTOMY      CV ANGIOGRAM RENAL BILATERAL Bilateral 11/17/2023    Procedure: Angiogram Renal Bilateral;  Surgeon: Ankit Bhatia MD;  Location:  [facility]    CV CORONARY ANGIOGRAM N/A 11/17/2023    Procedure: Coronary Angiogram;  Surgeon: Ankit Bhatia MD;  Location:  [facility]    CYSTOSCOPY      CYSTOSCOPY, INJECT BOTOX N/A 09/18/2023    Procedure: CYSTOSCOPY, WITH BOTULINUM TOXIN INJECTION;  Surgeon: Mishel Zendejas MD;  Location: [facility] OR    CYSTOSCOPY, INJECT BOTOX N/A 2/20/2024    Procedure: CYSTOSCOPY, WITH BOTULINUM TOXIN INJECTION;  Surgeon: Mishel Zendejas MD;  Location: [facility] OR    EYE SURGERY      GENITOURINARY SURGERY  1999    GI SURGERY      weakened rectal sphincter with artificial stimulator    HERNIA  REPAIR  1976    HYSTERECTOMY TOTAL ABDOMINAL      LAPAROTOMY, LYSIS ADHESIONS, COMBINED  02/07/2012    Procedure:COMBINED LAPAROTOMY, LYSIS ADHESIONS; Surgeon:MONTSERRAT BENDER; Location:[facility] OR    RELEASE CARPAL TUNNEL      RELEASE TENDON FOOT  03/15/2012    Procedure:RELEASE TENDON FOOT; Surgeon:SUKHDEEP METZ; Location:[facility] OR    REMOVE HARDWARE FOOT  12/13/2012    Procedure: REMOVE HARDWARE FOOT;  RIGHT FOOT REMOVAL OF HARDWARE;  Surgeon: Sukhdeep Metz MD;  Location: [facility] OR    SHOULDER SURGERY  11/12/2020    LEFT SHOULDER HEMIARHTROPLASTY, BICEP TENODESIS    SOFT TISSUE SURGERY  2018, 2020    TENDON RELEASE      foot    THYROIDECTOMY      ZZC FREEING BOWEL ADHESION,ENTEROLYSIS      1986, 1996, 1999    ZZC NONSPECIFIC PROCEDURE      throidectomy    ZZC TOTAL ABDOM HYSTERECTOMY  1980    + BSO        Social History:  Social History     Socioeconomic History    Marital status:      Spouse name: Not on file    Number of children: 2    Years of education: Not on file    Highest education level: Not on file   Occupational History    Not on file   Tobacco Use    Smoking status: Never     Passive exposure: Never    Smokeless tobacco: Never   Vaping Use    Vaping status: Never Used   Substance and Sexual Activity    Alcohol use: Never    Drug use: Never    Sexual activity: Not Currently     Partners: Male     Birth control/protection: Female Surgical   Other Topics Concern    Parent/sibling w/ CABG, MI or angioplasty before 65F 55M? Not Asked     Service Not Asked    Blood Transfusions Not Asked    Caffeine Concern Yes     Comment: occ    Occupational Exposure Not Asked    Hobby Hazards Not Asked    Sleep Concern Yes    Stress Concern Yes    Weight Concern Not Asked    Special Diet Yes     Comment: low carb, low sugar     Back Care Not Asked    Exercise No     Comment: occ. stationary bike     Bike Helmet Not Asked    Seat Belt Yes    Self-Exams Not Asked   Social History Narrative     , 2 children    Retired in medical records at [employer]      Social Determinants of Health     Financial Resource Strain: Low Risk  (11/20/2023)    Financial Resource Strain     Within the past 12 months, have you or your family members you live with been unable to get utilities (heat, electricity) when it was really needed?: No   Food Insecurity: Low Risk  (11/20/2023)    Food Insecurity     Within the past 12 months, did you worry that your food would run out before you got money to buy more?: No     Within the past 12 months, did the food you bought just not last and you didn t have money to get more?: No   Transportation Needs: High Risk (11/20/2023)    Transportation Needs     Within the past 12 months, has lack of transportation kept you from medical appointments, getting your medicines, non-medical meetings or appointments, work, or from getting things that you need?: Yes   Physical Activity: Not on file   Stress: Not on file   Social Connections: Unknown (12/31/2021)    Received from [facility], [facility] [employer] [facility]    Social Connections     Frequency of Communication with Friends and Family: Not on file   Interpersonal Safety: Low Risk  (11/21/2023)    Interpersonal Safety     Do you feel physically and emotionally safe where you currently live?: Yes     Within the past 12 months, have you been hit, slapped, kicked or otherwise physically hurt by someone?: No     Within the past 12 months, have you been humiliated or emotionally abused in other ways by your partner or ex-partner?: No   Housing Stability: Low Risk  (11/20/2023)    Housing Stability     Do you have housing? : Yes     Are you worried about losing your housing?: No        Family History:  Family History   Problem Relation Age of Onset    Arthritis Mother     Hypertension Mother     Cerebrovascular Disease Mother     Obesity Mother     Heart Disease Mother         MI's     Hypertension Father     Respiratory Father         Adult RDS    Diabetes Father     Arthritis Sister     Cancer Sister     Diabetes Sister     Hypertension Sister     Breast Cancer Sister     Depression Sister     Thyroid Disease Sister     Obesity Sister     Arthritis Sister     Hypertension Sister     Thyroid Disease Sister     Osteoporosis Sister     Obesity Sister     Cancer Sister         colon polup    Hypertension Sister     Osteoporosis Sister     Obesity Sister     Colon Cancer Sister     Lipids Sister     Obesity Sister     Heart Disease Brother         MI at 54    Other Cancer Brother         Lung & bone    Lipids Brother     Hypertension Brother     Diabetes Brother     Hyperlipidemia Brother     Obesity Maternal Grandmother         Dad s mother    Skin Cancer Maternal Grandmother         skin cancer unknown    Cancer Maternal Grandmother         unknown skin cancer on face    Obesity Paternal Grandmother         Mothers mother    Ovarian Cancer No family hx of     Anesthesia Reaction No family hx of     Deep Vein Thrombosis (DVT) No family hx of          ROS:  The 10 point review of systems is negative other than noted in the HPI and above.    PE:    General- Well-developed, well-nourished patient.  HEENT- Normocephalic and atraumatic. Pupils equal and round.  Mucous membranes moist.  Sclera are nonicteric.  Neck- No lymphadenopathy or masses   Respirations- are regular and non labored  Abdomen is abdomen is soft without significant tenderness, masses, organomegaly or guarding.  A stoma is present in the left lower quadrant.  The midline incision shows an area of subtle bulging which does not seem to change with Valsalva.  There is no palpable defect.  This appears to be within the abdominal wall scar tissue.    Review of the patient's CT scan from about a year ago reveals no evidence of a hernia in this area.            Assessment/Plan: Central abdominal bulge, likely of superficial fatty tissue.  I do  not see any evidence, clinically or on the CT scan from last year, of a hernia here.  We discussed the possibility of further imaging, but I think this is very unlikely to show anything.  There is some broadening of the scar tissue in this area and I think this simply allows some of the fatty tissue to look more prominent.  If the patient notices any increase in the area or worsening symptoms, she is welcome to come back and see me.  At this point, I would recommend long-term simple observation.        Mickey Chavez MD    Please route or send letter to:  Primary Care Provider (PCP) and Referring Provider

## 2024-06-28 ENCOUNTER — PATIENT OUTREACH (OUTPATIENT)
Dept: CARE COORDINATION | Facility: CLINIC | Age: 79
End: 2024-06-28

## 2024-06-28 ENCOUNTER — TRANSFERRED RECORDS (OUTPATIENT)
Dept: HEALTH INFORMATION MANAGEMENT | Facility: CLINIC | Age: 79
End: 2024-06-28

## 2024-06-28 ENCOUNTER — VIRTUAL VISIT (OUTPATIENT)
Dept: FAMILY MEDICINE | Facility: CLINIC | Age: 79
End: 2024-06-28
Payer: MEDICARE

## 2024-06-28 DIAGNOSIS — I10 BENIGN ESSENTIAL HYPERTENSION: ICD-10-CM

## 2024-06-28 DIAGNOSIS — M48.062 SPINAL STENOSIS OF LUMBAR REGION WITH NEUROGENIC CLAUDICATION: Primary | ICD-10-CM

## 2024-06-28 DIAGNOSIS — R10.9 LEFT FLANK PAIN: ICD-10-CM

## 2024-06-28 PROCEDURE — 99443 PR PHYSICIAN TELEPHONE EVALUATION 21-30 MIN: CPT | Mod: 93 | Performed by: INTERNAL MEDICINE

## 2024-06-28 NOTE — PROGRESS NOTES
Clinic Care Coordination Contact  Care Coordination Clinician Chart Review    Situation: Patient chart reviewed by Care Coordinator.       Background: Care Coordination Program started: 11/27/2023. Initial assessment completed and patient-centered care plan(s) were developed with participation from patient. Lead CC handed patient off to CHW for continued outreaches.       Assessment: Per chart review, patient outreach completed by CC CHW on 6/3/24.  Patient is actively working to accomplish goal(s). Patient's goal(s) appropriate and relevant at this time. Patient is not due for updated Plan of Care.  Assessments will be completed annually or as needed/with change of patient status.      Care Plan: Community Resources       Problem: Insufficient In-home support       Note:     Goal Statement: Sammie will continue working with Care Coordination to ensure her needs are met for overall health and wellbeing.  Date Goal Set: 12/5/23  Barriers: Limited finances  Strengths: Strong support system  Date to Achieve By: 12/5/24  Patient expressed understanding of goal: yes  Action steps to achieve this goal:  1. I will continue to follow up with medical team as needed  2. I will work with FRW on discussing and applying for E/W program  4. I will consider options for chore services in my home from CCSW and CHW  5. I will outreach to Care Coordination  for further questions or concerns          Goal: Establish adequate home support       This Visit's Progress: 40% Recent Progress: 30%                               Plan/Recommendations: The patient will continue working with Care Coordination to achieve goal(s) as above. CHW will continue outreaches at minimum every 30 days and will involve Lead CC as needed or if patient is ready to move to Maintenance. Lead CC will continue to monitor CHW outreaches and patient's progress to goal(s) every 6 weeks.     Plan of Care updated and sent to patient: Katie Hennessy   [facility]  Clinic Care Coordinator  [facility]  [phone]  [email or web address]

## 2024-06-28 NOTE — PROGRESS NOTES
Sammie is a 78 year old who is being evaluated via a billable telephone visit.    What phone number would you like to be contacted at? [phone]  How would you like to obtain your AVS? Carlos Alberto  Originating Location (pt. Location): Home    Distant Location (provider location):  On-site      Subjective   Sammie is a 78 year old, presenting for the following health issues:  Follow Up    History of Present Illness       CKD: She uses over the counter pain medication, including Tylenol Extra Strength gel caps, two times daily.    Hypertension: She presents for follow up of hypertension.  She does not check blood pressure  regularly outside of the clinic. Outside blood pressures have been over 140/90. She follows a low salt diet.     Reason for visit:  Having extreme fatigue, very bad night sweats, severe pain in my left kidney area. Lot of pressure in my stomach area. Body feels very hot often but temp reads 98.7. Don t feel well. Feels very weak and tired when I ve only been up for a hour or so.    She eats 2-3 servings of fruits and vegetables daily.She consumes 0 sweetened beverage(s) daily.She exercises with enough effort to increase her heart rate 10 to 19 minutes per day.  She exercises with enough effort to increase her heart rate 4 days per week.   She is taking medications regularly.         Hypertension   Blood pressure ranges from 183/89 and 129/61.  Occasionally light-headedness of dizziness. Otherewise tolerating blood pressure medications.  Has difficulty obtaining blood pressure readings due to arm circumference.  She notes no side effects of her blood pressure medications.  He continues on carvedilol, olmesartan, amlodipine, and furosemide.  She is drinking plenty of fluids  Spinal stenosis of lumbar region with neurogenic claudication   Has had difficulty with neurogenic claudication and noticing weakness and more difficulty with claudication and pain.  Has been following with spine clinic for this issue.   She continues on acetaminophen and Lyrica for pain control  Night sweats  Flank pain    Chasity Hodgson has has had flank pain ongoing x 6 weeks.  Was seen in the clinic on 05/24 for similar symptoms.  Had labs drawn that showed normal complete blood counts, normal thyroid function and slight elevation of creatinine.  Night sweats are still quite frequent.   No imaging has been obtained.  She wonders if there may be an opportunity to further investigate this through our clinic.      Review of Systems  Constitutional, HEENT, cardiovascular, pulmonary, gi and gu - flank pain as above. systems are negative, except as otherwise noted.      Objective           Vitals:  No vitals were obtained today due to virtual visit.    Physical Exam   General: Alert and no distress //Respiratory: No audible wheeze, cough, or shortness of breath // Psychiatric:  Appropriate affect, tone, and pace of words      (M48.062) Spinal stenosis of lumbar region with neurogenic claudication  (primary encounter diagnosis)  Comment: noted that she will have surgery upcomign and   Plan:     (I10) Benign essential hypertension  Comment: I recommended that she schedule a follow up in the office and that she continue to drink plenty of fluids.  We will check blood pressure when she returns to the clinic.  Plan:      (R10.9) Left flank pain  Comment: We discussed that she might be seen on Monday in acute and diagnostic services for possible imaging given persistent symptoms.  Plan: Referral to Acute and Diagnostic Services (Day         of diagnostic / First order acute)                    Phone call duration: 30 minutes  Signed Electronically by: Shola Benoit MD, MD

## 2024-07-01 ENCOUNTER — TELEPHONE (OUTPATIENT)
Dept: FAMILY MEDICINE | Facility: CLINIC | Age: 79
End: 2024-07-01
Payer: MEDICARE

## 2024-07-01 NOTE — TELEPHONE ENCOUNTER
"Patient called the clinic very upset. Patient is very upset that she has not heard back from the clinic when she called the clinic this morning. She stated that now she is not able to get a ride for an appointment tomorrow. She stated that she is in pain and \"the clinic does not care about me\". Apologized to the patient multiple times for the delay on the response. Patient continued to yell and stated that no one cares about her. Informed patient that we will work on getting her seen to get her pain better.     Could patient have a CT scan on Wednesday after her appointment endocrinology?    Rossana HERNANDEZ RN  [facility] Triage Team    "

## 2024-07-01 NOTE — TELEPHONE ENCOUNTER
ADS is closed at this time. Triage. Please huddle with ADS 07/02/2024 and follow up with pt. Thank you!

## 2024-07-01 NOTE — TELEPHONE ENCOUNTER
"Pt called the clinic regarding her recent VV with PCP.    Per note:  \"(R10.9) Left flank pain  Comment: We discussed that she might be seen on Monday in acute and diagnostic services for possible imaging given persistent symptoms.  Plan: Referral to Acute and Diagnostic Services (Day         of diagnostic / First order acute)\"    She is wondering if she should be seen in ADS or not. She is unable to be seen same-day to transportation, and would need to know by 4pm if she needs to arrange a ride to be seen in [facility]S tomorrow 7/2.    She states her symptoms have not improved since her VV.    Routing HP to PCP to advise.   Can we leave a detailed message on this number? YES  Phone number patient can be reached at: Home number on file [phone] (home)    Cara Bran RN  [facility] Triage    "

## 2024-07-02 NOTE — TELEPHONE ENCOUNTER
Writer huddled with ADS nurse who advised patient had already been accepted for tomorrow, MRN had been given to ADS by PCP.     Called and spoke with patient, patient states she has another appt at 11am tomorrow and requesting for ADS appt at 12:30, huddled with ADS and ADS scheduled patient:    7/3/2024 12:30 PM CS ADS PROVIDER [facility]     No further questions or concerns at this time.    Signing encounter.    Cee PINK  [facility]

## 2024-07-03 ENCOUNTER — OFFICE VISIT (OUTPATIENT)
Dept: ENDOCRINOLOGY | Facility: CLINIC | Age: 79
End: 2024-07-03
Payer: MEDICARE

## 2024-07-03 ENCOUNTER — ANCILLARY PROCEDURE (OUTPATIENT)
Dept: CT IMAGING | Facility: CLINIC | Age: 79
End: 2024-07-03
Attending: INTERNAL MEDICINE
Payer: MEDICARE

## 2024-07-03 ENCOUNTER — OFFICE VISIT (OUTPATIENT)
Dept: PEDIATRICS | Facility: CLINIC | Age: 79
End: 2024-07-03
Payer: MEDICARE

## 2024-07-03 VITALS
DIASTOLIC BLOOD PRESSURE: 74 MMHG | BODY MASS INDEX: 41.39 KG/M2 | RESPIRATION RATE: 16 BRPM | SYSTOLIC BLOOD PRESSURE: 144 MMHG | HEART RATE: 60 BPM | OXYGEN SATURATION: 99 % | HEIGHT: 60 IN | TEMPERATURE: 97.6 F | WEIGHT: 210.8 LBS

## 2024-07-03 VITALS
HEART RATE: 73 BPM | WEIGHT: 210.3 LBS | SYSTOLIC BLOOD PRESSURE: 161 MMHG | DIASTOLIC BLOOD PRESSURE: 78 MMHG | BODY MASS INDEX: 41.07 KG/M2

## 2024-07-03 DIAGNOSIS — C73 PAPILLARY CARCINOMA OF THYROID (H): ICD-10-CM

## 2024-07-03 DIAGNOSIS — E78.2 MIXED HYPERLIPIDEMIA: ICD-10-CM

## 2024-07-03 DIAGNOSIS — N18.32 CHRONIC KIDNEY DISEASE, STAGE 3B (H): ICD-10-CM

## 2024-07-03 DIAGNOSIS — E11.9 TYPE 2 DIABETES, HBA1C GOAL < 7% (H): ICD-10-CM

## 2024-07-03 DIAGNOSIS — R10.9 FLANK PAIN: ICD-10-CM

## 2024-07-03 DIAGNOSIS — S39.012A ACUTE MYOFASCIAL STRAIN OF LUMBAR REGION, INITIAL ENCOUNTER: Primary | ICD-10-CM

## 2024-07-03 DIAGNOSIS — C73 MALIGNANT NEOPLASM OF THYROID GLAND (H): Primary | ICD-10-CM

## 2024-07-03 LAB
ALBUMIN SERPL BCG-MCNC: 3.8 G/DL (ref 3.5–5.2)
ALBUMIN UR-MCNC: NEGATIVE MG/DL
ALP SERPL-CCNC: 54 U/L (ref 40–150)
ALT SERPL W P-5'-P-CCNC: 19 U/L (ref 0–50)
ANION GAP SERPL CALCULATED.3IONS-SCNC: 10 MMOL/L (ref 7–15)
APPEARANCE UR: CLEAR
AST SERPL W P-5'-P-CCNC: 29 U/L (ref 0–45)
BACTERIA #/AREA URNS HPF: ABNORMAL /HPF
BASOPHILS # BLD AUTO: 0 10E3/UL (ref 0–0.2)
BASOPHILS NFR BLD AUTO: 1 %
BILIRUB SERPL-MCNC: 0.3 MG/DL
BILIRUB UR QL STRIP: NEGATIVE
BUN SERPL-MCNC: 30.3 MG/DL (ref 8–23)
CALCIUM SERPL-MCNC: 9.3 MG/DL (ref 8.8–10.2)
CHLORIDE SERPL-SCNC: 107 MMOL/L (ref 98–107)
COLOR UR AUTO: YELLOW
CREAT SERPL-MCNC: 1.24 MG/DL (ref 0.51–0.95)
DEPRECATED HCO3 PLAS-SCNC: 24 MMOL/L (ref 22–29)
EGFRCR SERPLBLD CKD-EPI 2021: 45 ML/MIN/1.73M2
EOSINOPHIL # BLD AUTO: 0.2 10E3/UL (ref 0–0.7)
EOSINOPHIL NFR BLD AUTO: 3 %
ERYTHROCYTE [DISTWIDTH] IN BLOOD BY AUTOMATED COUNT: 12 % (ref 10–15)
GLUCOSE SERPL-MCNC: 156 MG/DL (ref 70–99)
GLUCOSE UR STRIP-MCNC: 500 MG/DL
HBA1C MFR BLD: 7.3 % (ref 0–5.6)
HCT VFR BLD AUTO: 35.4 % (ref 35–47)
HGB BLD-MCNC: 11.6 G/DL (ref 11.7–15.7)
HGB UR QL STRIP: NEGATIVE
IMM GRANULOCYTES # BLD: 0 10E3/UL
IMM GRANULOCYTES NFR BLD: 1 %
KETONES UR STRIP-MCNC: NEGATIVE MG/DL
LEUKOCYTE ESTERASE UR QL STRIP: NEGATIVE
LYMPHOCYTES # BLD AUTO: 2 10E3/UL (ref 0.8–5.3)
LYMPHOCYTES NFR BLD AUTO: 27 %
MCH RBC QN AUTO: 31 PG (ref 26.5–33)
MCHC RBC AUTO-ENTMCNC: 32.8 G/DL (ref 31.5–36.5)
MCV RBC AUTO: 95 FL (ref 78–100)
MONOCYTES # BLD AUTO: 0.6 10E3/UL (ref 0–1.3)
MONOCYTES NFR BLD AUTO: 8 %
NEUTROPHILS # BLD AUTO: 4.7 10E3/UL (ref 1.6–8.3)
NEUTROPHILS NFR BLD AUTO: 62 %
NITRATE UR QL: NEGATIVE
PH UR STRIP: 5.5 [PH] (ref 5–7)
PLATELET # BLD AUTO: 193 10E3/UL (ref 150–450)
POTASSIUM SERPL-SCNC: 4.9 MMOL/L (ref 3.4–5.3)
PROT SERPL-MCNC: 6.6 G/DL (ref 6.4–8.3)
RBC # BLD AUTO: 3.74 10E6/UL (ref 3.8–5.2)
RBC #/AREA URNS AUTO: ABNORMAL /HPF
SODIUM SERPL-SCNC: 141 MMOL/L (ref 135–145)
SP GR UR STRIP: 1.01 (ref 1–1.03)
SQUAMOUS #/AREA URNS AUTO: ABNORMAL /LPF
UROBILINOGEN UR STRIP-ACNC: 0.2 E.U./DL
WBC # BLD AUTO: 7.6 10E3/UL (ref 4–11)
WBC #/AREA URNS AUTO: ABNORMAL /HPF

## 2024-07-03 PROCEDURE — 85025 COMPLETE CBC W/AUTO DIFF WBC: CPT | Performed by: INTERNAL MEDICINE

## 2024-07-03 PROCEDURE — 83036 HEMOGLOBIN GLYCOSYLATED A1C: CPT | Performed by: INTERNAL MEDICINE

## 2024-07-03 PROCEDURE — 81001 URINALYSIS AUTO W/SCOPE: CPT | Performed by: INTERNAL MEDICINE

## 2024-07-03 PROCEDURE — 36415 COLL VENOUS BLD VENIPUNCTURE: CPT | Performed by: INTERNAL MEDICINE

## 2024-07-03 PROCEDURE — 99214 OFFICE O/P EST MOD 30 MIN: CPT | Performed by: INTERNAL MEDICINE

## 2024-07-03 PROCEDURE — 80053 COMPREHEN METABOLIC PANEL: CPT | Performed by: INTERNAL MEDICINE

## 2024-07-03 PROCEDURE — 74176 CT ABD & PELVIS W/O CONTRAST: CPT | Mod: MG

## 2024-07-03 PROCEDURE — 99215 OFFICE O/P EST HI 40 MIN: CPT | Performed by: INTERNAL MEDICINE

## 2024-07-03 RX ORDER — FLURBIPROFEN SODIUM 0.3 MG/ML
SOLUTION/ DROPS OPHTHALMIC
Qty: 200 EACH | Refills: 3 | Status: SHIPPED | OUTPATIENT
Start: 2024-07-03 | End: 2024-08-18

## 2024-07-03 RX ORDER — INSULIN GLARGINE 100 [IU]/ML
20 INJECTION, SOLUTION SUBCUTANEOUS DAILY
Qty: 15 ML | Refills: 5 | Status: ON HOLD | OUTPATIENT
Start: 2024-07-03 | End: 2024-08-21

## 2024-07-03 RX ORDER — LEVOTHYROXINE SODIUM 112 MCG
112 TABLET ORAL DAILY
Qty: 90 TABLET | Refills: 3 | Status: SHIPPED | OUTPATIENT
Start: 2024-07-03 | End: 2024-08-18

## 2024-07-03 RX ORDER — INSULIN ASPART 100 [IU]/ML
INJECTION, SOLUTION INTRAVENOUS; SUBCUTANEOUS
Qty: 6 ML | Refills: 5 | Status: SHIPPED | OUTPATIENT
Start: 2024-07-03 | End: 2024-08-18

## 2024-07-03 RX ORDER — LEVOTHYROXINE SODIUM 112 UG/1
112 TABLET ORAL DAILY
Qty: 90 TABLET | Refills: 1 | Status: CANCELLED | OUTPATIENT
Start: 2024-07-03

## 2024-07-03 RX ORDER — BLOOD-GLUCOSE SENSOR
1 EACH MISCELLANEOUS
Qty: 6 EACH | Refills: 3 | Status: SHIPPED | OUTPATIENT
Start: 2024-07-03

## 2024-07-03 RX ORDER — ICOSAPENT ETHYL 1 G/1
1 CAPSULE ORAL 2 TIMES DAILY WITH MEALS
Qty: 180 CAPSULE | Refills: 3 | Status: SHIPPED | OUTPATIENT
Start: 2024-07-03 | End: 2024-08-18

## 2024-07-03 RX ORDER — METHOCARBAMOL 500 MG/1
500-1000 TABLET, FILM COATED ORAL 4 TIMES DAILY PRN
Qty: 30 TABLET | Refills: 1 | Status: ON HOLD | OUTPATIENT
Start: 2024-07-03 | End: 2024-08-21

## 2024-07-03 NOTE — LETTER
7/3/2024      Chasity Hodgson  [address] [facility] [facility] [address]      Dear Colleague,    Thank you for referring your patient, Chasity Hodgson, to the [facility] [place]. Please see a copy of my visit note below.                      Chasity Hodgson is a 78 year old year old female here for evaluation of Diabetes via a billable video visit.SHe also has PMHx of Diabetes Mellitus, and PTC/post surgical hypothyrodisim. She also has past medical istory of ANNETTE, gastroparesis, obesity,  Last seen by me September 2023    INTERVAL HISTORY:  - was having lows, reduced insulin and has since eliminated them  - Post dinner highs, will have usually   - Has navigated pharmacy world to have prescriptions at most affordable pharmacies, reliable. Had issues with insulin shipping  - Some L flank pain, worsening swelling in lower extremities    1) Diabetes Mellitus    Diabetes History:  Diagnosis: 2015, picked up on routine labs  Hospitalizations: None  Previous Regimens: Farxia (difficulty with frequent urination), Toujeo (was working well but insurance formulary changed 1/1/2021) At highest was at 48u daily, and then had profound low. At time of discontinuing, was down to 5u of toujeo. Previously on metformin. Glipizide, Ozepmic (worsening gastroparesis), Sitagliptin (swelling in leg)  Current Regimen: Jardiance 10mg daily, basaglar 18u daily, if under 150 when goes to dose, will take only 12, Novolog 10u with breakfast and dinner (will if eat lunch as well, but doesn't always eat lunch)     BG check- Freestyle Libre3  Trends-  see below    Complications: Gastroparesis- previously on reglan, overall not impactful    Last eye exam: July 2024- no retinopathy, has cataracts,   Foot Exam: [facility], checks every 60 days   Blood pressure- Lisinopril 40mg daily, Atenolol 50mg daily  Lipids- ezetimibe 10 mg daily, fenofibrate 145mg daily  SIOBHAN last 3/2024- undetectable    2) Hypothyroidism s/p thyroidectomy  for PTC  Diagnosis: known 3 nodules that were being monitored, and in 2002 s/p thyroidectomy (nodule in isthmus was cancer), s/p BUTLER (per patient, unclear if clean margins so received BUTLER, unknown dose)  Treatment: Levothyroxine 112mcg 6 days a week, 1/2 tablet friday  Residual tissue on R that has been monitored, not growing.   Last ultrasound 10/2021- no tissue seen  last biomarkers 10/2023- TG <0.1, TGAb <0.4   Monitoring every other year     3) Vitamin D Deficiency  - Taking 2 tab daily  - PTH 9 (9/16/2021)--> recheck 21 10/21/2022  - Last Vit D 62    4) Hypertriglyceridemia  - On ezetimibe and fenofibrate, tried statin before with myalgias  - Eats minimal processed foods, minimal fried foods/baked treats      BP Readings from Last 3 Encounters:   07/03/24 (!) 161/78   06/18/24 124/86   05/24/24 (!) 151/76       Lab Results   Component Value Date    A1C 9.6 01/11/2022    A1C 9.5 10/07/2021    A1C 9.0 08/02/2021    A1C 8.7 07/07/2021    A1C 6.2 11/27/2020    A1C 6.8 09/10/2019       Recent Labs   Lab Test 01/11/22  1359 03/29/19  1355 08/01/17  0000 02/15/17  0000 02/11/16  0000 03/31/15  1327   CHOL 177 196   < > 142   < > 158   HDL 34* 27*   < > 31   < > 34*   LDL 81 99   < > 49   < > 51   TRIG 308* 349*   < > 309   < > 365*   CHOLHDLRATIO  --   --   --  4.6  --  4.6    < > = values in this interval not displayed.       Lab Results   Component Value Date    MICROL 36 01/11/2022    MICROL 23 07/07/2021     No results found for: MICROALBUMIN        Wt Readings from Last 3 Encounters:   07/03/24 95.4 kg (210 lb 4.8 oz)   06/18/24 94.3 kg (208 lb)   05/24/24 94.8 kg (208 lb 14.4 oz)       Current Outpatient Medications   Medication Sig Dispense Refill     acetaminophen (TYLENOL) 500 MG tablet Take 1,000 mg by mouth every 8 hours as needed       amLODIPine (NORVASC) 2.5 MG tablet Take 1 tablet (2.5 mg) by mouth every evening For blood pressure > 140 90 tablet 3     aspirin (ASA) 81 MG EC tablet Take 81 mg by mouth  daily (with lunch)       BIOTIN PO Take 1 capsule by mouth at bedtime Unknown dose       calcium citrate 250 MG TABS Take 2 tablets by mouth daily       carvedilol (COREG) 12.5 MG tablet Take 1 tablet (12.5 mg) by mouth 2 times daily (with meals) 180 tablet 2     clotrimazole (LOTRIMIN) 1 % cream Apply topically as needed (rash/yeast infection)       Continuous Glucose Sensor (FREESTYLE BRANDY 3 SENSOR) MISC 1 each every 14 days Change every 14 days 6 each 3     Cyanocobalamin (B-12 PO) Take 1 tablet by mouth every evening Unknown dose. Takes in the afternoon       empagliflozin (JARDIANCE) 10 MG TABS tablet Take 1 tablet (10 mg) by mouth every evening 90 tablet 3     ezetimibe (ZETIA) 10 MG tablet Take 1 tablet (10 mg) by mouth every evening 90 tablet 3     famotidine (PEPCID) 40 MG tablet Take 40 mg by mouth daily       fenofibrate (TRICOR) 145 MG tablet Take 1 tablet (145 mg) by mouth daily 90 tablet 1     fexofenadine (ALLEGRA) 180 MG tablet Take 180 mg by mouth daily (with lunch) Takes in the afternoon 120 0     fluocinolone acetonide (DERMA SMOOTHE/FS BODY) 0.01 % external oil Apply 2 mL to scalp once per week. Massage into scalp. Can be left in overnight or washed out after 4-6 hours. 118.28 mL 5     fluticasone (FLONASE) 50 MCG/ACT spray Spray 2 sprays in nostril daily 2 sprays in each nostril qd (Patient taking differently: Spray 2 sprays in nostril every morning 2 sprays in each nostril qd) 1 Bottle 0     furosemide (LASIX) 20 MG tablet Take 1 tablet (20 mg) by mouth daily as needed (lower extremity edema) 90 tablet 3     icosapent ethyl (VASCEPA) 1 g CAPS capsule Take 1 g by mouth 2 times daily (with meals) 180 capsule 3     insulin aspart (NOVOLOG FLEXPEN) 100 UNIT/ML pen Inject 10 Units Subcutaneous daily before breakfast AND 12 Units daily (before supper). Breakfast and supper, pt eats minimal at lunch 6 mL 5     INSULIN GLARGINE 100 UNIT/ML pen Inject 20 Units Subcutaneous daily 15 mL 5     insulin pen  needle (B-D U/F) 31G X 5 MM miscellaneous Use 4 pen needles daily or as directed. 200 each 3     ketoconazole (NIZORAL) 2 % external cream Twice a day for severe flare 30 g 1     levothyroxine (SYNTHROID/LEVOTHROID) 112 MCG tablet Take 1 tablet (112 mcg) by mouth daily 90 tablet 1     Lidocaine (LIDOCARE) 4 % Patch Place 2-3 patches onto the skin every 24 hours To prevent lidocaine toxicity, patient should be patch free for 12 hrs daily.       minoxidil (LONITEN) 2.5 MG tablet Take half tablet (1.25 mg) by mouth once daily (Patient taking differently: Take 1.25 mg by mouth every morning Take half tablet (1.25 mg) by mouth once daily) 90 tablet 2     MULTIPLE VITAMIN PO Take 1 tablet by mouth daily (with lunch)       nystatin (MYCOSTATIN) cream Apply topically daily as needed (groin)       olmesartan (BENICAR) 40 MG tablet Take 1 tablet (40 mg) by mouth daily 90 tablet 3     pantoprazole (PROTONIX) 40 MG EC tablet Take 40 mg by mouth 2 times daily       Polyethylene Glycol 400 (BLINK TEARS) 0.25 % GEL Apply 1 drop to eye at bedtime       pregabalin (LYRICA) 25 MG capsule Take 1 capsule (25 mg) by mouth 2 times daily 60 capsule 11     sucralfate (CARAFATE) 1 GM/10ML suspension Take 1 g by mouth 4 times daily as needed (GERD)       SYNTHROID 112 MCG tablet Take 1 tablet (112 mcg) by mouth daily 90 tablet 3     tretinoin (RETIN-A) 0.025 % external cream Apply topically nightly as needed (facial lesions) Use every night as tolerated - spot treat lesion       triamcinolone (KENALOG) 0.1 % external ointment Apply topically every other day 80 g 3     Vitamin D3 50 mcg (2000 units) tablet Take 1 tablet by mouth every evening Takes in the afternoon       acetaminophen-caffeine (EXCEDRIN TENSION HEADACHE) 500-65 MG TABS Take 2 tablets by mouth every 6 hours as needed for mild pain 90 tablet 0     amoxicillin (AMOXIL) 500 MG capsule Takes 4 caps before every dental appointments.       azelastine (ASTEPRO) 0.15 % nasal spray  Spray 1 spray into both nostrils daily as needed (Patient not taking: Reported on 7/3/2024)       blood glucose (NO BRAND SPECIFIED) test strip Use to test blood sugar 1 times daily or as directed. 100 strip 1     nitroGLYcerin (NITROSTAT) 0.4 MG sublingual tablet Place 1 tablet (0.4 mg) under the tongue every 5 minutes as needed for chest pain (no more than 3 in one hour; after 3rd, call 911.) 25 tablet 3     nystatin-triamcinolone (MYCOLOG II) cream Apply topically daily as needed (genital itching)       ondansetron (ZOFRAN) 4 MG tablet Take 1 tablet (4 mg) by mouth every 6 hours as needed Reported on 3/20/2017 20 tablet 0     order for DME Equipment being ordered: Compression stockings - Knee High; 20-30 mmHg compression - note would like adhesive band to keep the stocking from sliding down 3 each 0     polyethylene glycol 400 (BLINK TEARS) 0.25 % SOLN ophthalmic solution Place 1 drop into both eyes daily       triamcinolone (KENALOG) 0.1 % external cream Apply topically 2 times daily 15 g 1       Histories reviewed and updated in Epic.  Past Medical History:   Diagnosis Date     Abdominal adhesions 1984, 96,99    s/p lysis     Abdominal adhesions      Acne rosacea      Allergic rhinitis      Allergic rhinitis, cause unspecified      Alopecia      Anemia      CAD (coronary artery disease)      Carotid stenosis      CKD (chronic kidney disease) stage 2, GFR 60-89 ml/min      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)      CPAP (continuous positive airway pressure) dependence      Depression      Diabetic gastroparesis (H)      Diet-controlled type 2 diabetes mellitus (H)      DM2 (diabetes mellitus, type 2) (H)      DVT (deep venous thrombosis) (H)      DVT of axillary vein, acute right (H)      Fibromyalgia      Gastro-oesophageal reflux disease      GERD (gastroesophageal reflux disease)      Hernia of unspecified site of abdominal cavity without mention of obstruction or gangrene     bilateral     Hernia, abdominal      History of  blood transfusion     10/ 1980     History of thrombophlebitis      HTN (hypertension)      HTN (hypertension)      Hypertriglyceridemia      Hypertriglyceridemia      Hypokalemia      Hypothyroidism      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     Mumps      Obstructive sleep apnea      ANNETTE (obstructive sleep apnea)      ANNETTE on CPAP      Osteoarthritis of glenohumeral joint      Papillary carcinoma of thyroid (H)     s/p thyroidectomy - Ruegemer     Papillary carcinoma of thyroid (H)      PE (pulmonary embolism)      Poliomyelitis     poor circulation right leg     Poliomyelitis      Postsurgical hypothyroidism     s/p papillary thryoid cancer - Ruegemer     Pulmonary embolism (H)      Pulmonary embolus (H)      Pulmonary nodule      Rosacea      S/P carpal tunnel release     bilateral     S/P hardware removal 01/2014    still with lingering foot pain     S/P shoulder surgery     bilateral     Septic joint (H)     right knee     Septic joint of right knee joint (H)      Venous insufficiency      Venous insufficiency      Venous thrombosis 1999    right axillary vein       Past Surgical History:   Procedure Laterality Date     AMPUTATE TOE(S)  03/15/2012    Procedure:AMPUTATE TOE(S); Surgeon:RUBEN MOSES; Location: OR     AMPUTATE TOE(S)       APPENDECTOMY  1972     APPENDECTOMY       ARTHRODESIS FOOT  03/15/2012    Procedure:ARTHRODESIS FOOT; RIGHT TRIPLE ARTHRODESIS, FIFTH TOE AMPUTATION, LATERAL LIGAMENT RECONSTRUCTION, TENDON TRANSFER AND RELEASE [MINI C-ARM, ARTHREX 4.5 AND 6.7 STAPLES, BIOCOMPOSITE TENODESIS SCREWS]; Surgeon:RUBEN MOSES; Location: OR     ARTHRODESIS FOOT Right     Right foot triple arthrodesis and removal of hardware     ARTHROSCOPY SHOULDER  06/25/2015    REVISION SUBACROMIAL DECOMPRESSION, EXCISION OF GANGLION CYST, DEBRIDEMENT AND EXCISION OF THE GLENOHUMERAL JOINT GANGLION CYST, CORACOID DECOMPRESSION, POSSIBLE SUBSCAPULARIS REPAIR AND OPEN SUBSCAPULARIS BICEP      ARTHROSCOPY SHOULDER ROTATOR CUFF REPAIR       BIOPSY  Thyroid 2002     BLADDER SURGERY       BOTOX INJECTION MEDICAL       BREAST SURGERY  Biopsy     CHOLECYSTECTOMY       CHOLECYSTECTOMY       COLONOSCOPY  2018     COLOSTOMY  02/07/2012    Procedure:COLOSTOMY; CREATION OF SIGMOID COLOSTOMY AND EXTENSIVE  LYSIS OF ADHESIONS; Surgeon:MONTSERRAT BENDER; Location: OR     COLOSTOMY       CV ANGIOGRAM RENAL BILATERAL Bilateral 11/17/2023    Procedure: Angiogram Renal Bilateral;  Surgeon: Ankit Bhatia MD;  Location:  [facility]     CV CORONARY ANGIOGRAM N/A 11/17/2023    Procedure: Coronary Angiogram;  Surgeon: Ankit Bhatia MD;  Location:  [facility] LAB     CYSTOSCOPY       CYSTOSCOPY, INJECT BOTOX N/A 09/18/2023    Procedure: CYSTOSCOPY, WITH BOTULINUM TOXIN INJECTION;  Surgeon: Mishel Zendejas MD;  Location: [facility] [place]     CYSTOSCOPY, INJECT BOTOX N/A 2/20/2024    Procedure: CYSTOSCOPY, WITH BOTULINUM TOXIN INJECTION;  Surgeon: Mishel Zendejas MD;  Location: [facility] [place]     EYE SURGERY       GENITOURINARY SURGERY  1999     GI SURGERY      weakened rectal sphincter with artificial stimulator     HERNIA REPAIR  1976     HYSTERECTOMY TOTAL ABDOMINAL       LAPAROTOMY, LYSIS ADHESIONS, COMBINED  02/07/2012    Procedure:COMBINED LAPAROTOMY, LYSIS ADHESIONS; Surgeon:MONTSERRAT BENDER; Location: OR     RELEASE CARPAL TUNNEL       RELEASE TENDON FOOT  03/15/2012    Procedure:RELEASE TENDON FOOT; Surgeon:RUBEN MOSES; Location: OR     REMOVE HARDWARE FOOT  12/13/2012    Procedure: REMOVE HARDWARE FOOT;  RIGHT FOOT REMOVAL OF HARDWARE;  Surgeon: Ruben Moses MD;  Location:  OR     SHOULDER SURGERY  11/12/2020    LEFT SHOULDER HEMIARHTROPLASTY, BICEP TENODESIS     SOFT TISSUE SURGERY  2018, 2020     TENDON RELEASE      foot     THYROIDECTOMY       Z FREEING BOWEL ADHESION,ENTEROLYSIS      1986, 1996, 1999     ZZC NONSPECIFIC PROCEDURE       throidectomy     ZZC TOTAL ABDOM HYSTERECTOMY  1980    + BSO       Allergies:  Ketorolac tromethamine, Nsaids, Celecoxib, Morphine and codeine, Codeine, Conjugated estrogens, Crestor [rosuvastatin], No clinical screening - see comments, Vioxx, and Sulfa antibiotics    Social History     Tobacco Use     Smoking status: Never     Passive exposure: Never     Smokeless tobacco: Never   Substance Use Topics     Alcohol use: Never       Family History   Problem Relation Age of Onset     Arthritis Mother      Hypertension Mother      Cerebrovascular Disease Mother      Obesity Mother      Heart Disease Mother         MI's     Hypertension Father      Respiratory Father         Adult RDS     Diabetes Father      Arthritis Sister      Cancer Sister      Diabetes Sister      Hypertension Sister      Breast Cancer Sister      Depression Sister      Thyroid Disease Sister      Obesity Sister      Arthritis Sister      Hypertension Sister      Thyroid Disease Sister      Osteoporosis Sister      Obesity Sister      Cancer Sister         colon polup     Hypertension Sister      Osteoporosis Sister      Obesity Sister      Colon Cancer Sister      Lipids Sister      Obesity Sister      Heart Disease Brother         MI at 54     Other Cancer Brother         Lung & bone     Lipids Brother      Hypertension Brother      Diabetes Brother      Hyperlipidemia Brother      Obesity Maternal Grandmother         Dad s mother     Skin Cancer Maternal Grandmother         skin cancer unknown     Cancer Maternal Grandmother         unknown skin cancer on face     Obesity Paternal Grandmother         Mothers mother     Ovarian Cancer No family hx of      Anesthesia Reaction No family hx of      Deep Vein Thrombosis (DVT) No family hx of           REVIEW OF SYSTEMS:   ROS: 10 point ROS neg other than the symptoms noted above in the HPI.      EXAM:  Vitals: BP (!) 161/78 (BP Location: Right arm, Patient Position: Sitting, Cuff Size: Adult Regular)    Pulse 73   Wt 95.4 kg (210 lb 4.8 oz)   LMP  (LMP Unknown)   BMI 41.07 kg/m      BMIE= Body mass index is 41.07 kg/m .  Exam:  Constitutional: healthy, alert, no acute distress  Head: Normocephalic. No masses, lesions, no exophthalmos/proptosis  ENT: no palpable thyroid, no cervical lymph nodes  Respiratory: nonlabored  Gastrointestinal: Abdomen soft, non-tender.  Musculoskeletal: sig swelling of lower extremities b/l w/ some erythema over shins, compression sock in place over R lower extremity  Neurologic: Gait normal. sensation grossly intact, walks with walker  Psychiatric: mentation appears normal, calm    Neck Ultrasound 8/2022:  FINDINGS: Postoperative changes of thyroidectomy are noted. No  evidence for a recurrent soft tissue lesion in the thyroidectomy bed.  No worrisome lesions are identified in the neck. Ultrasound scanning  through the region of the left neck palpable abnormality demonstrates  a normal-appearing left submandibular gland. Incidentally noted  atherosclerotic plaque is noted in the left internal carotid artery.                                                                    IMPRESSION:    1. No convincing evidence for recurrent malignancy in the neck.   2. The left neck palpable abnormality appears to correspond to a  normal left submandibular gland. If there is continued clinical  concern for a suspicious neck mass, consider contrast-enhanced CT or  MRI of the neck for further evaluation.        ASSESSMENT/PLAN:  No diagnosis found.  Orders Placed This Encounter   Procedures     Hemoglobin A1c       1) Diabetes Mellitus   - Glucose Control- NOT at goal, but improved/minimized hypoglycmiea   - Basaglar increase to 20u daily, Increase novolog 10u with breakfast and 12u with dinner   - Continue  Jardiance 10mg daily   - Cardiovascular Risk- continue ezetimibe, fenofibrate,   - Ophthalmology- uptodate, instructed to return Aug 2024, no NPDR.  - Podiatry- patient instructed on routine  foot care, follows with podiatry  - Renal- Uptodate,  continue lisinopril, recheck SIOBHAN 3/2025    2) Cardiovascular Risk-   - Continue ezetimibe, fenofibrate.   - Continue icosapent ethyl 1g BID  - Continue aspirin 81mg daily     3) Hypothyroidism, postsurgical  - TSH/FT4 at goal  - Continue LT4 112mcg     4) History of Papillary Thyroid Cancer  - U/S and TG/TGab show biochemically and structurally complete response  - Now 20+ years out (2002)  - enlarged LN, - no recurrence on ultrasound 2022  - Recheck TG level 2025 (last 2023, plan for every other year), last ultrasound 2022    RTC- 3 months as scheduled    A total of 37 minutes were spent today 07/03/24 on this visit including chart review, history and counseling, documentation and other activities as detailed above.         Again, thank you for allowing me to participate in the care of your patient.        Sincerely,        Odette Weston MD

## 2024-07-03 NOTE — NURSING NOTE
Chief Complaint   Patient presents with    RECHECK     Type 2 diabetes, HbA1c goal < 7% (H) +1 more         Vitals:    07/03/24 1049 07/03/24 1111   BP: (!) 177/68 (!) 161/78   BP Location: Right arm Right arm   Patient Position: Sitting Sitting   Cuff Size: Adult Regular Adult Regular   Pulse: 80 73   Weight: 95.4 kg (210 lb 4.8 oz)        Body mass index is 41.07 kg/m .      Jorge Yao MA

## 2024-07-03 NOTE — PROGRESS NOTES
Chasity Hodgson is a 78 year old year old female here for evaluation of Diabetes via a billable video visit.SHe also has PMHx of Diabetes Mellitus, and PTC/post surgical hypothyrodisim. She also has past medical istory of ANNETTE, gastroparesis, obesity,  Last seen by me September 2023    INTERVAL HISTORY:  - was having lows, reduced insulin and has since eliminated them  - Post dinner highs, will have usually   - Has navigated pharmacy world to have prescriptions at most affordable pharmacies, reliable. Had issues with insulin shipping  - Some L flank pain, worsening swelling in lower extremities    1) Diabetes Mellitus    Diabetes History:  Diagnosis: 2015, picked up on routine labs  Hospitalizations: None  Previous Regimens: Farxia (difficulty with frequent urination), Toujeo (was working well but insurance formulary changed 1/1/2021) At highest was at 48u daily, and then had profound low. At time of discontinuing, was down to 5u of toujeo. Previously on metformin. Glipizide, Ozepmic (worsening gastroparesis), Sitagliptin (swelling in leg)  Current Regimen: Jardiance 10mg daily, basaglar 18u daily, if under 150 when goes to dose, will take only 12, Novolog 10u with breakfast and dinner (will if eat lunch as well, but doesn't always eat lunch)     BG check- Freestyle Libre3  Trends-  see below    Complications: Gastroparesis- previously on reglan, overall not impactful    Last eye exam: July 2024- no retinopathy, has cataracts,   Foot Exam: [facility], checks every 60 days   Blood pressure- Lisinopril 40mg daily, Atenolol 50mg daily  Lipids- ezetimibe 10 mg daily, fenofibrate 145mg daily  SIOBHAN last 3/2024- undetectable    2) Hypothyroidism s/p thyroidectomy for PTC  Diagnosis: known 3 nodules that were being monitored, and in 2002 s/p thyroidectomy (nodule in isthmus was cancer), s/p BUTLER (per patient, unclear if clean margins so received BUTLER, unknown dose)  Treatment: Levothyroxine 112mcg 6 days a week, 1/2  tablet friday  Residual tissue on R that has been monitored, not growing.   Last ultrasound 10/2021- no tissue seen  last biomarkers 10/2023- TG <0.1, TGAb <0.4   Monitoring every other year     3) Vitamin D Deficiency  - Taking 2 tab daily  - PTH 9 (9/16/2021)--> recheck 21 10/21/2022  - Last Vit D 62    4) Hypertriglyceridemia  - On ezetimibe and fenofibrate, tried statin before with myalgias  - Eats minimal processed foods, minimal fried foods/baked treats      BP Readings from Last 3 Encounters:   07/03/24 (!) 161/78   06/18/24 124/86   05/24/24 (!) 151/76       Lab Results   Component Value Date    A1C 9.6 01/11/2022    A1C 9.5 10/07/2021    A1C 9.0 08/02/2021    A1C 8.7 07/07/2021    A1C 6.2 11/27/2020    A1C 6.8 09/10/2019       Recent Labs   Lab Test 01/11/22  1359 03/29/19  1355 08/01/17  0000 02/15/17  0000 02/11/16  0000 03/31/15  1327   CHOL 177 196   < > 142   < > 158   HDL 34* 27*   < > 31   < > 34*   LDL 81 99   < > 49   < > 51   TRIG 308* 349*   < > 309   < > 365*   CHOLHDLRATIO  --   --   --  4.6  --  4.6    < > = values in this interval not displayed.       Lab Results   Component Value Date    MICROL 36 01/11/2022    MICROL 23 07/07/2021     No results found for: MICROALBUMIN        Wt Readings from Last 3 Encounters:   07/03/24 95.4 kg (210 lb 4.8 oz)   06/18/24 94.3 kg (208 lb)   05/24/24 94.8 kg (208 lb 14.4 oz)       Current Outpatient Medications   Medication Sig Dispense Refill    acetaminophen (TYLENOL) 500 MG tablet Take 1,000 mg by mouth every 8 hours as needed      amLODIPine (NORVASC) 2.5 MG tablet Take 1 tablet (2.5 mg) by mouth every evening For blood pressure > 140 90 tablet 3    aspirin (ASA) 81 MG EC tablet Take 81 mg by mouth daily (with lunch)      BIOTIN PO Take 1 capsule by mouth at bedtime Unknown dose      calcium citrate 250 MG TABS Take 2 tablets by mouth daily      carvedilol (COREG) 12.5 MG tablet Take 1 tablet (12.5 mg) by mouth 2 times daily (with meals) 180 tablet 2     clotrimazole (LOTRIMIN) 1 % cream Apply topically as needed (rash/yeast infection)      Continuous Glucose Sensor (FREESTYLE BRANDY 3 SENSOR) MISC 1 each every 14 days Change every 14 days 6 each 3    Cyanocobalamin (B-12 PO) Take 1 tablet by mouth every evening Unknown dose. Takes in the afternoon      empagliflozin (JARDIANCE) 10 MG TABS tablet Take 1 tablet (10 mg) by mouth every evening 90 tablet 3    ezetimibe (ZETIA) 10 MG tablet Take 1 tablet (10 mg) by mouth every evening 90 tablet 3    famotidine (PEPCID) 40 MG tablet Take 40 mg by mouth daily      fenofibrate (TRICOR) 145 MG tablet Take 1 tablet (145 mg) by mouth daily 90 tablet 1    fexofenadine (ALLEGRA) 180 MG tablet Take 180 mg by mouth daily (with lunch) Takes in the afternoon 120 0    fluocinolone acetonide (DERMA SMOOTHE/FS BODY) 0.01 % external oil Apply 2 mL to scalp once per week. Massage into scalp. Can be left in overnight or washed out after 4-6 hours. 118.28 mL 5    fluticasone (FLONASE) 50 MCG/ACT spray Spray 2 sprays in nostril daily 2 sprays in each nostril qd (Patient taking differently: Spray 2 sprays in nostril every morning 2 sprays in each nostril qd) 1 Bottle 0    furosemide (LASIX) 20 MG tablet Take 1 tablet (20 mg) by mouth daily as needed (lower extremity edema) 90 tablet 3    icosapent ethyl (VASCEPA) 1 g CAPS capsule Take 1 g by mouth 2 times daily (with meals) 180 capsule 3    insulin aspart (NOVOLOG FLEXPEN) 100 UNIT/ML pen Inject 10 Units Subcutaneous daily before breakfast AND 12 Units daily (before supper). Breakfast and supper, pt eats minimal at lunch 6 mL 5    INSULIN GLARGINE 100 UNIT/ML pen Inject 20 Units Subcutaneous daily 15 mL 5    insulin pen needle (B-D U/F) 31G X 5 MM miscellaneous Use 4 pen needles daily or as directed. 200 each 3    ketoconazole (NIZORAL) 2 % external cream Twice a day for severe flare 30 g 1    levothyroxine (SYNTHROID/LEVOTHROID) 112 MCG tablet Take 1 tablet (112 mcg) by mouth daily 90 tablet  1    Lidocaine (LIDOCARE) 4 % Patch Place 2-3 patches onto the skin every 24 hours To prevent lidocaine toxicity, patient should be patch free for 12 hrs daily.      minoxidil (LONITEN) 2.5 MG tablet Take half tablet (1.25 mg) by mouth once daily (Patient taking differently: Take 1.25 mg by mouth every morning Take half tablet (1.25 mg) by mouth once daily) 90 tablet 2    MULTIPLE VITAMIN PO Take 1 tablet by mouth daily (with lunch)      nystatin (MYCOSTATIN) cream Apply topically daily as needed (groin)      olmesartan (BENICAR) 40 MG tablet Take 1 tablet (40 mg) by mouth daily 90 tablet 3    pantoprazole (PROTONIX) 40 MG EC tablet Take 40 mg by mouth 2 times daily      Polyethylene Glycol 400 (BLINK TEARS) 0.25 % GEL Apply 1 drop to eye at bedtime      pregabalin (LYRICA) 25 MG capsule Take 1 capsule (25 mg) by mouth 2 times daily 60 capsule 11    sucralfate (CARAFATE) 1 GM/10ML suspension Take 1 g by mouth 4 times daily as needed (GERD)      SYNTHROID 112 MCG tablet Take 1 tablet (112 mcg) by mouth daily 90 tablet 3    tretinoin (RETIN-A) 0.025 % external cream Apply topically nightly as needed (facial lesions) Use every night as tolerated - spot treat lesion      triamcinolone (KENALOG) 0.1 % external ointment Apply topically every other day 80 g 3    Vitamin D3 50 mcg (2000 units) tablet Take 1 tablet by mouth every evening Takes in the afternoon      acetaminophen-caffeine (EXCEDRIN TENSION HEADACHE) 500-65 MG TABS Take 2 tablets by mouth every 6 hours as needed for mild pain 90 tablet 0    amoxicillin (AMOXIL) 500 MG capsule Takes 4 caps before every dental appointments.      azelastine (ASTEPRO) 0.15 % nasal spray Spray 1 spray into both nostrils daily as needed (Patient not taking: Reported on 7/3/2024)      blood glucose (NO BRAND SPECIFIED) test strip Use to test blood sugar 1 times daily or as directed. 100 strip 1    nitroGLYcerin (NITROSTAT) 0.4 MG sublingual tablet Place 1 tablet (0.4 mg) under the  tongue every 5 minutes as needed for chest pain (no more than 3 in one hour; after 3rd, call 911.) 25 tablet 3    nystatin-triamcinolone (MYCOLOG II) cream Apply topically daily as needed (genital itching)      ondansetron (ZOFRAN) 4 MG tablet Take 1 tablet (4 mg) by mouth every 6 hours as needed Reported on 3/20/2017 20 tablet 0    order for DME Equipment being ordered: Compression stockings - Knee High; 20-30 mmHg compression - note would like adhesive band to keep the stocking from sliding down 3 each 0    polyethylene glycol 400 (BLINK TEARS) 0.25 % SOLN ophthalmic solution Place 1 drop into both eyes daily      triamcinolone (KENALOG) 0.1 % external cream Apply topically 2 times daily 15 g 1       Histories reviewed and updated in Epic.  Past Medical History:   Diagnosis Date    Abdominal adhesions 1984, 96,99    s/p lysis    Abdominal adhesions     Acne rosacea     Allergic rhinitis     Allergic rhinitis, cause unspecified     Alopecia     Anemia     CAD (coronary artery disease)     Carotid stenosis     CKD (chronic kidney disease) stage 2, GFR 60-89 ml/min     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)     CPAP (continuous positive airway pressure) dependence     Depression     Diabetic gastroparesis (H)     Diet-controlled type 2 diabetes mellitus (H)     DM2 (diabetes mellitus, type 2) (H)     DVT (deep venous thrombosis) (H)     DVT of axillary vein, acute right (H)     Fibromyalgia     Gastro-oesophageal reflux disease     GERD (gastroesophageal reflux disease)     Hernia of unspecified site of abdominal cavity without mention of obstruction or gangrene     bilateral    Hernia, abdominal     History of blood transfusion     10/ 1980    History of thrombophlebitis     HTN (hypertension)     HTN (hypertension)     Hypertriglyceridemia     Hypertriglyceridemia     Hypokalemia     Hypothyroidism     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    Mumps     Obstructive sleep apnea     ANNETTE (obstructive sleep apnea)     ANNETTE on CPAP      Osteoarthritis of glenohumeral joint     Papillary carcinoma of thyroid (H)     s/p thyroidectomy - Ruegemer    Papillary carcinoma of thyroid (H)     PE (pulmonary embolism)     Poliomyelitis     poor circulation right leg    Poliomyelitis     Postsurgical hypothyroidism     s/p papillary thryoid cancer - Ruegemer    Pulmonary embolism (H)     Pulmonary embolus (H)     Pulmonary nodule     Rosacea     S/P carpal tunnel release     bilateral    S/P hardware removal 01/2014    still with lingering foot pain    S/P shoulder surgery     bilateral    Septic joint (H)     right knee    Septic joint of right knee joint (H)     Venous insufficiency     Venous insufficiency     Venous thrombosis 1999    right axillary vein       Past Surgical History:   Procedure Laterality Date    AMPUTATE TOE(S)  03/15/2012    Procedure:AMPUTATE TOE(S); Surgeon:RUBEN MOSES; Location: OR    AMPUTATE TOE(S)      APPENDECTOMY  1972    APPENDECTOMY      ARTHRODESIS FOOT  03/15/2012    Procedure:ARTHRODESIS FOOT; RIGHT TRIPLE ARTHRODESIS, FIFTH TOE AMPUTATION, LATERAL LIGAMENT RECONSTRUCTION, TENDON TRANSFER AND RELEASE [MINI C-ARM, ARTHREX 4.5 AND 6.7 STAPLES, BIOCOMPOSITE TENODESIS SCREWS]; Surgeon:RUBEN MOSES; Location: OR    ARTHRODESIS FOOT Right     Right foot triple arthrodesis and removal of hardware    ARTHROSCOPY SHOULDER  06/25/2015    REVISION SUBACROMIAL DECOMPRESSION, EXCISION OF GANGLION CYST, DEBRIDEMENT AND EXCISION OF THE GLENOHUMERAL JOINT GANGLION CYST, CORACOID DECOMPRESSION, POSSIBLE SUBSCAPULARIS REPAIR AND OPEN SUBSCAPULARIS BICEP    ARTHROSCOPY SHOULDER ROTATOR CUFF REPAIR      BIOPSY  Thyroid 2002    BLADDER SURGERY      BOTOX INJECTION MEDICAL      BREAST SURGERY  Biopsy    CHOLECYSTECTOMY      CHOLECYSTECTOMY      COLONOSCOPY  2018    COLOSTOMY  02/07/2012    Procedure:COLOSTOMY; CREATION OF SIGMOID COLOSTOMY AND EXTENSIVE  LYSIS OF ADHESIONS; Surgeon:MONTSERRAT BENDER; Location: OR     COLOSTOMY      CV ANGIOGRAM RENAL BILATERAL Bilateral 11/17/2023    Procedure: Angiogram Renal Bilateral;  Surgeon: Ankit Bhatia MD;  Location:  [facility]    CV CORONARY ANGIOGRAM N/A 11/17/2023    Procedure: Coronary Angiogram;  Surgeon: Ankit Bhatia MD;  Location:  [facility]    CYSTOSCOPY      CYSTOSCOPY, INJECT BOTOX N/A 09/18/2023    Procedure: CYSTOSCOPY, WITH BOTULINUM TOXIN INJECTION;  Surgeon: Mishel Zendejas MD;  Location: [facility] [place] OR    CYSTOSCOPY, INJECT BOTOX N/A 2/20/2024    Procedure: CYSTOSCOPY, WITH BOTULINUM TOXIN INJECTION;  Surgeon: Mishel Zendejas MD;  Location: [facility] [place] OR    EYE SURGERY      GENITOURINARY SURGERY  1999    GI SURGERY      weakened rectal sphincter with artificial stimulator    HERNIA REPAIR  1976    HYSTERECTOMY TOTAL ABDOMINAL      LAPAROTOMY, LYSIS ADHESIONS, COMBINED  02/07/2012    Procedure:COMBINED LAPAROTOMY, LYSIS ADHESIONS; Surgeon:MONTSERRAT BENDER; Location: OR    RELEASE CARPAL TUNNEL      RELEASE TENDON FOOT  03/15/2012    Procedure:RELEASE TENDON FOOT; Surgeon:RUBEN MOSES; Location: OR    REMOVE HARDWARE FOOT  12/13/2012    Procedure: REMOVE HARDWARE FOOT;  RIGHT FOOT REMOVAL OF HARDWARE;  Surgeon: Ruben Moses MD;  Location:  OR    SHOULDER SURGERY  11/12/2020    LEFT SHOULDER HEMIARHTROPLASTY, BICEP TENODESIS    SOFT TISSUE SURGERY  2018, 2020    TENDON RELEASE      foot    THYROIDECTOMY      ZZC FREEING BOWEL ADHESION,ENTEROLYSIS      1986, 1996, 1999    ZZC NONSPECIFIC PROCEDURE      throidectomy    ZZC TOTAL ABDOM HYSTERECTOMY  1980    + BSO       Allergies:  Ketorolac tromethamine, Nsaids, Celecoxib, Morphine and codeine, Codeine, Conjugated estrogens, Crestor [rosuvastatin], No clinical screening - see comments, Vioxx, and Sulfa antibiotics    Social History     Tobacco Use    Smoking status: Never     Passive exposure: Never    Smokeless tobacco: Never   Substance  Use Topics    Alcohol use: Never       Family History   Problem Relation Age of Onset    Arthritis Mother     Hypertension Mother     Cerebrovascular Disease Mother     Obesity Mother     Heart Disease Mother         MI's    Hypertension Father     Respiratory Father         Adult RDS    Diabetes Father     Arthritis Sister     Cancer Sister     Diabetes Sister     Hypertension Sister     Breast Cancer Sister     Depression Sister     Thyroid Disease Sister     Obesity Sister     Arthritis Sister     Hypertension Sister     Thyroid Disease Sister     Osteoporosis Sister     Obesity Sister     Cancer Sister         colon polup    Hypertension Sister     Osteoporosis Sister     Obesity Sister     Colon Cancer Sister     Lipids Sister     Obesity Sister     Heart Disease Brother         MI at 54    Other Cancer Brother         Lung & bone    Lipids Brother     Hypertension Brother     Diabetes Brother     Hyperlipidemia Brother     Obesity Maternal Grandmother         Dad s mother    Skin Cancer Maternal Grandmother         skin cancer unknown    Cancer Maternal Grandmother         unknown skin cancer on face    Obesity Paternal Grandmother         Mothers mother    Ovarian Cancer No family hx of     Anesthesia Reaction No family hx of     Deep Vein Thrombosis (DVT) No family hx of           REVIEW OF SYSTEMS:   ROS: 10 point ROS neg other than the symptoms noted above in the HPI.      EXAM:  Vitals: BP (!) 161/78 (BP Location: Right arm, Patient Position: Sitting, Cuff Size: Adult Regular)   Pulse 73   Wt 95.4 kg (210 lb 4.8 oz)   LMP  (LMP Unknown)   BMI 41.07 kg/m      BMIE= Body mass index is 41.07 kg/m .  Exam:  Constitutional: healthy, alert, no acute distress  Head: Normocephalic. No masses, lesions, no exophthalmos/proptosis  ENT: no palpable thyroid, no cervical lymph nodes  Respiratory: nonlabored  Gastrointestinal: Abdomen soft, non-tender.  Musculoskeletal: sig swelling of lower extremities b/l w/ some  erythema over shins, compression sock in place over R lower extremity  Neurologic: Gait normal. sensation grossly intact, walks with walker  Psychiatric: mentation appears normal, calm    Neck Ultrasound 8/2022:  FINDINGS: Postoperative changes of thyroidectomy are noted. No  evidence for a recurrent soft tissue lesion in the thyroidectomy bed.  No worrisome lesions are identified in the neck. Ultrasound scanning  through the region of the left neck palpable abnormality demonstrates  a normal-appearing left submandibular gland. Incidentally noted  atherosclerotic plaque is noted in the left internal carotid artery.                                                                    IMPRESSION:    1. No convincing evidence for recurrent malignancy in the neck.   2. The left neck palpable abnormality appears to correspond to a  normal left submandibular gland. If there is continued clinical  concern for a suspicious neck mass, consider contrast-enhanced CT or  MRI of the neck for further evaluation.        ASSESSMENT/PLAN:  No diagnosis found.  Orders Placed This Encounter   Procedures    Hemoglobin A1c       1) Diabetes Mellitus   - Glucose Control- NOT at goal, but improved/minimized hypoglycmiea   - Basaglar increase to 20u daily, Increase novolog 10u with breakfast and 12u with dinner   - Continue  Jardiance 10mg daily   - Cardiovascular Risk- continue ezetimibe, fenofibrate,   - Ophthalmology- uptodate, instructed to return Aug 2024, no NPDR.  - Podiatry- patient instructed on routine foot care, follows with podiatry  - Renal- Uptodate,  continue lisinopril, recheck SIOBHAN 3/2025    2) Cardiovascular Risk-   - Continue ezetimibe, fenofibrate.   - Continue icosapent ethyl 1g BID  - Continue aspirin 81mg daily     3) Hypothyroidism, postsurgical  - TSH/FT4 at goal  - Continue LT4 112mcg     4) History of Papillary Thyroid Cancer  - U/S and TG/TGab show biochemically and structurally complete response  - Now 20+ years  out (2002)  - enlarged LN, - no recurrence on ultrasound 2022  - Recheck TG level 2025 (last 2023, plan for every other year), last ultrasound 2022    RTC- 3 months as scheduled    A total of 37 minutes were spent today 07/03/24 on this visit including chart review, history and counseling, documentation and other activities as detailed above.

## 2024-07-03 NOTE — PROGRESS NOTES
[facility] Clinic Visit    Assessment & Plan     Pt presents  with 2 weeks L sided low back pain. Presentation is most consistent with musculoskeletal etiology.  The patient is worried about renal etiology especially because she understood her renal function is depressed.    We did obtain labs which show stable (actually better than prior) GFR.  [place] UA except for glucose and stable A1c.      Imaging is reassuring.  +fatty liver, which we did discuss.      Acute myofascial strain of lumbar region, initial encounter  NSAID contra indicated. She thinks she has had muscle relaxer in the past Rx sent. Risks and SE discussed.      Flank pain  As above  - UA with Microscopic reflex to Culture  - Comprehensive metabolic panel  - CBC with platelets differential  - CT Abdomen Pelvis w/o Contrast  - UA with Microscopic reflex to Culture  - Comprehensive metabolic panel  - CBC with platelets differential  - UA Microscopic with Reflex to Culture  - methocarbamol (ROBAXIN) 500 MG tablet  Dispense: 30 tablet; Refill: 1    Type 2 diabetes, HbA1c goal < 7% (H)  A1c obtained today/  - Hemoglobin A1c    Chronic kidney disease, stage 3b (H)  Stable if not improved GFR.        41  minutes were spent doing chart review, history and exam, documentation and further activities per the note.      BMI  Estimated body mass index is 41.17 kg/m  as calculated from the following:    Height as of this encounter: 1.524 m (5').    Weight as of this encounter: 95.6 kg (210 lb 12.8 oz).         No follow-ups on file.    Raheem Cochran is a 78 year old, presenting for the following health issues:  Flank Pain (/)    HPI     Pt with B flank pain.  L more than R. Has been ongoing for 2 weeks.  Worse at night. Described as a stabbing pain.      No hematuria or dysuria. +nausea.  No emesis.  She has an ostomy and noted 1 days of increased soft output.      She is diabetic, no major changes to blood sugars.    No   changes      Abdominal/Flank Pain  Onset/Duration: 2 weeks  Description:   Character: Stabbing  Location: right flank left flank  Radiation: None  Intensity: moderate  Progression of Symptoms:  same  Accompanying Signs & Symptoms:  Fever/chills: YES- night sweats but no fevers  Gas/Bloating: YES  Nausea: YES  Vomitting: no   Diarrhea: no   Constipation:no   Dysuria: no            Hematuria: no            Frequency: no            Incontinence of urine: YES  Concerns with urinary retention and hesitancy  History:   Trauma: no   Previous similar pain: no    Previous tests done: none           Previous Abdominal surgery: YES- gallbladder and appendix removed, hysterectomy  Precipitating factors:   Does the pain change with:     Food: YES     Bowel Movement: no     Urination: no              Other factors: no   Therapies tried and outcome:  Tylenol Extra Strength            Objective    BP (!) 144/74 (BP Location: Right arm, Patient Position: Sitting, Cuff Size: Adult Regular)   Pulse 60   Temp 97.6  F (36.4  C)   Resp 16   Ht 1.524 m (5')   Wt 95.6 kg (210 lb 12.8 oz)   LMP  (LMP Unknown)   SpO2 99%   BMI 41.17 kg/m    Body mass index is 41.17 kg/m .  Physical Exam   GEN NAD  ENT MMM  CV RRR  CHEST bases CTA B  BACK:  L flank TTP.  L mid to lower back diffusely tender.              Signed Electronically by: Jorge Luis Diaz MD

## 2024-07-17 ENCOUNTER — MYC MEDICAL ADVICE (OUTPATIENT)
Dept: ENDOCRINOLOGY | Facility: CLINIC | Age: 79
End: 2024-07-17
Payer: MEDICARE

## 2024-07-18 ENCOUNTER — PATIENT OUTREACH (OUTPATIENT)
Dept: CARE COORDINATION | Facility: CLINIC | Age: 79
End: 2024-07-18
Payer: MEDICARE

## 2024-07-18 NOTE — PROGRESS NOTES
Clinic Care Coordination Contact  Community Health Worker Follow Up    The Community Health Worker called for a Care Coordination outreach to follow up on goals and action steps.   Spoke with patient.    Patient stated she cannot talk now.  Patient stated she is getting ready for [employer] to pick her up.  [facility]    Patient requested a call back next week.    CHW Plan: will outreach in 5 - 7 business days.    HERMANN Hwang  Clinic Care Coordination  [facility]: [place] [place], [place], [place], and [place] [facility]  Phone: [phone]

## 2024-07-22 ENCOUNTER — NURSE TRIAGE (OUTPATIENT)
Dept: FAMILY MEDICINE | Facility: CLINIC | Age: 79
End: 2024-07-22
Payer: MEDICARE

## 2024-07-22 NOTE — TELEPHONE ENCOUNTER
Called patient back regarding appt.     Appointments in Next Year      Jul 24, 2024 11:00 AM  (Arrive by 10:40 AM)  Provider Visit with Maude Ortiz PA-C  [facility] ([facility] ) [phone]

## 2024-07-22 NOTE — TELEPHONE ENCOUNTER
"Nurse Triage SBAR    Is this a 2nd Level Triage? NO    Situation: Patient calling for L breast pain.     Background: Pain started around two weeks ago.  Patient states that it is intermittent sharp shooting pain that goes from their armpit through to the nipple.  Patient states that the skin is tender to the touch.  No visual differences or feeling of lumps.  Patient does state they have a red raised area on one part of the breast, but that has been present for a long time.  Mammogram from March was ok.      Assessment:   Intermittent L breast pain.  Sharp shooting from armpit to nipple  Tender to the touch  No other symptoms present      Protocol Recommended Disposition:   See in Office Within 3 Days    Recommendation: See in office in 3 days.  Informed patient that this information will be routed to clinic to check for available appointments.  Advised patient to call back with any new or worsening symptoms.  Patient verbalized understanding and agrees with plan of care.    Josesito PINEDO RN      Reason for Disposition   Patient wants to be seen    Additional Information   Negative: Chest pain   Negative: Breastfeeding questions about baby   Negative: Breastfeeding questions about mother (breast symptoms or feeling sick)   Negative: Breastfeeding questions about mother's medicines and diet   Negative: Postpartum breast pain and swelling, not breastfeeding   Negative: Small spot, skin growth or mole   Negative: SEVERE breast pain and fever > 103 F  (39.4 C)   Negative: Patient sounds very sick or weak to the triager   Negative: Breast looks infected (spreading redness, feels hot or painful to touch) and fever   Negative: Breast looks infected (spreading redness, feels hot or painful to touch) and no fever   Negative: Painful rash and multiple small blisters grouped together (i.e., dermatomal distribution or \"band\" or \"stripe\")   Negative: Cuts, burns, or bruises of breasts and suspicious history for the injury   Negative: " Breast lump   Negative: Nipple discharge and bloody   Negative: Nipple is inverted (i.e., points inward)  (Exception: Long-term physical characteristic, present for many years.)   Negative: Dry flaking-peeling skin of nipple   Negative: Change in shape or appearance of breast   Negative: Dimpling of skin of breast (i.e., skin looks like the outside of an orange peel)    Protocols used: Breast Symptoms-A-[place]

## 2024-07-23 ENCOUNTER — PATIENT OUTREACH (OUTPATIENT)
Dept: CARE COORDINATION | Facility: CLINIC | Age: 79
End: 2024-07-23
Payer: MEDICARE

## 2024-07-23 NOTE — PROGRESS NOTES
Clinic Care Coordination Contact  Community Health Worker Follow Up    Care Gaps:     Health Maintenance Due   Topic Date Due    URINE DRUG SCREEN  Never done    RSV VACCINE (Pregnancy & 60+) (1 - 1-dose 60+ series) Never done    ZOSTER IMMUNIZATION (2 of 3) 10/24/2016    MEDICARE ANNUAL WELLNESS VISIT  10/12/2023    COVID-19 Vaccine (7 - 2023-24 season) 02/23/2024       Care Gaps Last addressed on 7/23/24    Care Plan:   Care Plan: Community Resources       Problem: Insufficient In-home support       Note:     Goal Statement: Sammie will continue working with Care Coordination to ensure her needs are met for overall health and wellbeing.  Date Goal Set: 12/5/23  Barriers: Limited finances  Strengths: Strong support system  Date to Achieve By: 12/5/24  Patient expressed understanding of goal: yes  Action steps to achieve this goal:  1. I will continue to follow up with medical team as needed  2. I will work with FRW on discussing and applying for E/W program  4. I will consider options for chore services in my home from CCSW and CHW  5. I will outreach to Care Coordination  for further questions or concerns          Goal: Establish adequate home support       This Visit's Progress: 50% Recent Progress: 40%                        Discussed:  Patient stated she postponed her surgery until October.  Patient stated her  has stage 4 cancer and only has a few months to live.  Patient stated the cancer has spread to both lungs.  Patient stated she plans to see her  later this week.  Patient stated she completed the SNAP application.  Patient is working on the documents needed.  Patient stated the mail order pharmacy is going okay.      Intervention and Education during outreach:     CHW encouraged patient to contact CC if there are any other changes or resources needed.  Patient acknowledged understanding.      CHW Plan: will outreach in one month.    HERMANN Hwang  Clinic Care Coordination  JASON  [facility]: [facility] [place], and [facility]  Phone: [phone]

## 2024-07-24 ENCOUNTER — OFFICE VISIT (OUTPATIENT)
Dept: FAMILY MEDICINE | Facility: CLINIC | Age: 79
End: 2024-07-24
Payer: MEDICARE

## 2024-07-24 VITALS
OXYGEN SATURATION: 100 % | WEIGHT: 208 LBS | HEART RATE: 70 BPM | DIASTOLIC BLOOD PRESSURE: 62 MMHG | SYSTOLIC BLOOD PRESSURE: 131 MMHG | TEMPERATURE: 97.7 F | RESPIRATION RATE: 16 BRPM | BODY MASS INDEX: 40.84 KG/M2 | HEIGHT: 60 IN

## 2024-07-24 DIAGNOSIS — N64.4 BREAST PAIN, LEFT: Primary | ICD-10-CM

## 2024-07-24 PROCEDURE — 99213 OFFICE O/P EST LOW 20 MIN: CPT | Performed by: PHYSICIAN ASSISTANT

## 2024-07-24 RX ORDER — IPRATROPIUM BROMIDE 21 UG/1
1 SPRAY, METERED NASAL EVERY 8 HOURS PRN
COMMUNITY

## 2024-07-24 ASSESSMENT — PAIN SCALES - GENERAL: PAINLEVEL: EXTREME PAIN (8)

## 2024-07-24 NOTE — PATIENT INSTRUCTIONS
Continue lotrmin cream to painful areas    Mammogram, call to schedule in next week: [phone]    If above is normal, we could try PT and follow-up with your spine doctor

## 2024-07-24 NOTE — PROGRESS NOTES
Assessment and Plan:     (N64.4) Breast pain, left  (primary encounter diagnosis)  Comment: onset about 3 weeks ago, describes burning pain and irritation lf left breast, no obvious rash aside from mild yeast rash under both breasts, no e/o zoster, he thinks she had some clear nipple discharge but not sure, had some wetness on bra, no palpable masses, no personal Hx breast cancer but her sister has cancer, denies back pain or injury  Plan: MA Diagnostic Digital Bilateral        Will get mammogram  Has lotrimin at home for yeast  If mammogram normal, could consider PT and follow-up with spine as this could be thoracic radiculopathy     [facility] Same Day Provider         Subjective   Sammie is a 78 year old, presenting for the following health issues:  Breast Pain (Left breast)    History of Present Illness       Reason for visit:  Severe left breast pain and possible rash on lower back  Symptom onset:  3-4 weeks ago  Symptoms include:  Bad tenderness in skin/tissue of upper left breast on right side, pain goes down under arm pit and down left side of the left breast to the nipple. Very severe sharp pain stabs into left breast and nipple. Severe soreness on bra line.  Symptom intensity:  Severe  Symptom progression:  Worsening  Had these symptoms before:  No  What makes it worse:  Sitting up, moving around.  What makes it better:  Laying back resting    She eats 2-3 servings of fruits and vegetables daily.She consumes 0 sweetened beverage(s) daily.She exercises with enough effort to increase her heart rate 9 or less minutes per day.  She exercises with enough effort to increase her heart rate 3 or less days per week.   She is taking medications regularly.      Sammie is here for left breast pain that started around 7/5/24  The skin over left breast is very sensitive to touch and she has had burning sensation over left breast into left axilla   She hasn't noticed any masses, rash  She did noticed  some nipple discharge about a month ago, clear     Last mammogram was March of this year, normal    Has had left breast biopsy in 2010 which was negative   Denies personal Hx of breast cancer    Sister has breast cancer     Has had shingles vaccine x 1, due for booster  Had chicken pox as child         Objective    /62 (BP Location: Left arm, Patient Position: Sitting, Cuff Size: Adult Large)   Pulse 70   Temp 97.7  F (36.5  C) (Oral)   Resp 16   Ht 1.524 m (5')   Wt 94.3 kg (208 lb)   LMP  (LMP Unknown)   SpO2 100%   Breastfeeding No   BMI 40.62 kg/m    Body mass index is 40.62 kg/m .    Physical Exam     GENERAL: healthy, alert and no distress  RESP: lungs clear to auscultation - no rales, no rhonchi, no wheezes  CV: regular rates and rhythm, normal S1 S2, no S3 or S4 and no murmur, no click or rub   BREAST: pink erythematous rash under both breasts c/w mild yeast dermatitis, otherwise no rash, skin changes, induration or palpable masses bilat breasts, no nipple discharge bilat breast, no palpable axillary LN bilaterally   Thoracic spine: visible mild kyphosis, no midline tenderness, moves both upper extremities           Signed Electronically by: Maude Ortiz PA-C

## 2024-07-27 ENCOUNTER — MYC MEDICAL ADVICE (OUTPATIENT)
Dept: FAMILY MEDICINE | Facility: CLINIC | Age: 79
End: 2024-07-27
Payer: MEDICARE

## 2024-07-27 DIAGNOSIS — M79.2 NEUROPATHIC PAIN: ICD-10-CM

## 2024-07-27 DIAGNOSIS — I10 BENIGN ESSENTIAL HYPERTENSION: ICD-10-CM

## 2024-07-29 ENCOUNTER — OFFICE VISIT (OUTPATIENT)
Dept: DERMATOLOGY | Facility: CLINIC | Age: 79
End: 2024-07-29
Payer: MEDICARE

## 2024-07-29 VITALS — SYSTOLIC BLOOD PRESSURE: 177 MMHG | DIASTOLIC BLOOD PRESSURE: 73 MMHG

## 2024-07-29 DIAGNOSIS — L30.9 DERMATITIS: Primary | ICD-10-CM

## 2024-07-29 DIAGNOSIS — L23.5 ALLERGIC DERMATITIS DUE TO OTHER CHEMICAL PRODUCT: ICD-10-CM

## 2024-07-29 DIAGNOSIS — L65.9 LOSS OF HAIR: ICD-10-CM

## 2024-07-29 PROCEDURE — 99213 OFFICE O/P EST LOW 20 MIN: CPT | Performed by: DERMATOLOGY

## 2024-07-29 ASSESSMENT — PAIN SCALES - GENERAL: PAINLEVEL: SEVERE PAIN (7)

## 2024-07-29 NOTE — TELEPHONE ENCOUNTER
Pt is requesting refill to [facility] mail order pharmacy.     Routing refill request to provider for review/approval because:  Pt is requesting change on medication directions -  Also please make the Amlodipine Besylate and Pregabalin as PRN because I don t take them everyday

## 2024-07-29 NOTE — PROGRESS NOTES
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 Tinea corporis or candida/yeast infection under the breast  - OTC clomitrazole  3. Rosacea - stable  - Vanicream  4.  Venous stasis dermatitis - stable  - Vanicream  5. Allergic contact dermatitis - stable  - Uses fragrance-free products  - Patch testing revealed mild reaction to Balsam of [place], fragrance mix, and a strong reaction to paraphenylenediamine.   6. Xerosis cutis  -Recommend continuing use of a gentle, fragrance-free emollient based moisturizer such as Vanicream or CeraVe.   7. Abrasion , right upper arm 02/15/2021.   - Monitor, photo taken today.   8. Milia, right lateral eye  - Removed 8/16/2021   - prior tx: tretinoin  ____________________________________________    Assessment & Plan:   # Female pattern hair loss- improving  # Hx of allergic contact dermatitis of scalp  Hair loss is overall improving on oral minoxidil   - Fluocinolone 0.01% oil once per week for scalp pruritus and dryness.  - Continue to use laser headband or comb at least 3 times per week.  - Continue to use of Vanicream shampoo  - Increase oral minoxidil to 1.25mg 3x a week (patient currently taking twice weekly due to swelling) , monitor labs at next visit - only to increase if tolerated     #Lichen Simplex Chronicus  -Triamcinolone 0.1%ointment to use on the back at night on weekdays and off on weekends. Continue Cerave moisturizer to area.     #Xerosis, bilateral arms  - Recommend Vaniply to arms daily. Patient to use [employer] for fragrance free moisturizers    Procedures Performed: None    Follow-up: 6 months    Staff and Resident  Medical Student:  Yolanda Victor, MS4    Staff Physician:  I was present with the medical student who participated in the service and in the documentation of the note. I have verified the history and personally performed the physical exam and medical decision making. I agree with the assessment and plan of care as documented in the note.           Renetta Meyer MD  Professor   Department of Dermatology  [facility]: Phone: [phone], Fax:[phone]  [facility]: Phone: [phone], Fax: [phone]      ____________________________________________    CC: follow up for hair loss    HPI:  Ms. Chasity Hodgson is a 78 year old female who presents as a return patient for follow-up of hair loss, diagnosed as female pattern hair loss. Patient is on oral minoxidil 1.25 mg daily, reports some swelling in bilateral lower extremities and hands. Using laser comb 3x/week and fluocinolone oil once weekly. Reports increased shedding in the last few months, denies itching or flaking. Patient reports she has noticed increased itching and roughnesson her back. Patient scheduled for back surgery in October 2024 for foot paralysis.     Also [facility] she has seen.     Labs:  None reviewed    Physical Exam:  GEN: Well developed, well-nourished, in no acute distress, in a pleasant mood.    SKIN: Focused examination of scalp and face was performed.  - Blayne part width of 1.3/1.2 at front, 1.2 at vertex   - The layers of hair regrowth layers were noted to be  - 1-2 cm for the first  - 2-3 cm at the second, robust  - 5-6 cm at the third, cut layer  - no diffuse erythema   - no perifollicular erythema  - no perifollicular scale   - no scaling of the scalp   - normal eyelash density  - normal eyebrow density  - negative hair pull test  - No other lesions of concern on areas examined.      Medications:  Current Outpatient Medications   Medication Sig Dispense Refill    acetaminophen (TYLENOL) 500 MG tablet Take 1,000 mg by mouth every 8 hours as needed      acetaminophen-caffeine (EXCEDRIN TENSION HEADACHE) 500-65 MG TABS Take 2 tablets by mouth every 6 hours as needed for mild pain 90 tablet 0    amLODIPine (NORVASC) 2.5 MG tablet Take 1 tablet (2.5 mg) by mouth every evening For blood pressure > 140 90 tablet 3    amoxicillin (AMOXIL) 500 MG capsule Takes 4 caps before every dental appointments.      aspirin (ASA) 81 MG EC tablet Take 81 mg by mouth daily (with lunch)      BIOTIN PO Take 1 capsule by mouth at bedtime Unknown dose      blood glucose (NO BRAND SPECIFIED) test strip Use to test blood sugar 1 times daily or as directed. 100 strip 1    calcium citrate 250 MG TABS Take 2 tablets by mouth daily      carvedilol (COREG) 12.5 MG tablet Take 1 tablet (12.5 mg) by mouth 2 times daily (with meals) 180 tablet 2    clotrimazole (LOTRIMIN) 1 % cream Apply topically as needed (rash/yeast infection)      Continuous Glucose Sensor (FREESTYLE BRANDY 3 SENSOR) MISC 1 each every 14 days Change every 14 days 6 each 3    Cyanocobalamin (B-12 PO) Take 1 tablet by mouth every evening Unknown dose. Takes in the afternoon      empagliflozin (JARDIANCE) 10 MG TABS tablet Take 1 tablet (10 mg) by mouth every evening 90 tablet 3    ezetimibe (ZETIA) 10 MG tablet Take 1 tablet (10 mg) by mouth every evening 90 tablet 3    famotidine (PEPCID) 40 MG tablet Take 40 mg by mouth daily      fenofibrate (TRICOR) 145 MG tablet Take 1 tablet (145 mg) by mouth daily 90 tablet 1    fexofenadine (ALLEGRA) 180 MG tablet Take 180 mg by mouth daily (with lunch) Takes in the afternoon 120 0    fluocinolone acetonide (DERMA SMOOTHE/FS BODY) 0.01 % external oil Apply 2 mL to scalp once per week. Massage into scalp. Can be left in overnight or washed out after 4-6 hours. 118.28 mL 5    furosemide (LASIX) 20 MG tablet Take 1  tablet (20 mg) by mouth daily as needed (lower extremity edema) 90 tablet 3    icosapent ethyl (VASCEPA) 1 g CAPS capsule Take 1 g by mouth 2 times daily (with meals) 180 capsule 3    insulin aspart (NOVOLOG FLEXPEN) 100 UNIT/ML pen Inject 10 Units Subcutaneous daily before breakfast AND 12 Units daily (before supper). Breakfast and supper, pt eats minimal at lunch 6 mL 5    INSULIN GLARGINE 100 UNIT/ML pen Inject 20 Units Subcutaneous daily 15 mL 5    insulin pen needle (B-D U/F) 31G X 5 MM miscellaneous Use 4 pen needles daily or as directed. 200 each 3    ipratropium (ATROVENT) 0.03 % nasal spray Spray 1 spray in nostril every 8 hours      ketoconazole (NIZORAL) 2 % external cream Twice a day for severe flare 30 g 1    levothyroxine (SYNTHROID/LEVOTHROID) 112 MCG tablet Take 1 tablet (112 mcg) by mouth daily 90 tablet 1    Lidocaine (LIDOCARE) 4 % Patch Place 2-3 patches onto the skin every 24 hours To prevent lidocaine toxicity, patient should be patch free for 12 hrs daily.      methocarbamol (ROBAXIN) 500 MG tablet Take 1-2 tablets (500-1,000 mg) by mouth 4 times daily as needed for muscle spasms 30 tablet 1    minoxidil (LONITEN) 2.5 MG tablet Take half tablet (1.25 mg) by mouth once daily (Patient taking differently: Take 1.25 mg by mouth every morning Take half tablet (1.25 mg) by mouth once daily) 90 tablet 2    MULTIPLE VITAMIN PO Take 1 tablet by mouth daily (with lunch)      nitroGLYcerin (NITROSTAT) 0.4 MG sublingual tablet Place 1 tablet (0.4 mg) under the tongue every 5 minutes as needed for chest pain (no more than 3 in one hour; after 3rd, call 911.) 25 tablet 3    nystatin (MYCOSTATIN) cream Apply topically daily as needed (groin)      nystatin-triamcinolone (MYCOLOG II) cream Apply topically daily as needed (genital itching)      olmesartan (BENICAR) 40 MG tablet Take 1 tablet (40 mg) by mouth daily 90 tablet 3    ondansetron (ZOFRAN) 4 MG tablet Take 1 tablet (4 mg) by mouth every 6 hours as  needed Reported on 3/20/2017 20 tablet 0    order for DME Equipment being ordered: Compression stockings - Knee High; 20-30 mmHg compression - note would like adhesive band to keep the stocking from sliding down 3 each 0    pantoprazole (PROTONIX) 40 MG EC tablet Take 40 mg by mouth 2 times daily      Polyethylene Glycol 400 (BLINK TEARS) 0.25 % GEL Apply 1 drop to eye at bedtime      polyethylene glycol 400 (BLINK TEARS) 0.25 % SOLN ophthalmic solution Place 1 drop into both eyes daily      pregabalin (LYRICA) 25 MG capsule Take 1 capsule (25 mg) by mouth 2 times daily 60 capsule 11    sucralfate (CARAFATE) 1 GM/10ML suspension Take 1 g by mouth 4 times daily as needed (GERD)      SYNTHROID 112 MCG tablet Take 1 tablet (112 mcg) by mouth daily 90 tablet 3    tretinoin (RETIN-A) 0.025 % external cream Apply topically nightly as needed (facial lesions) Use every night as tolerated - spot treat lesion      triamcinolone (KENALOG) 0.1 % external cream Apply topically 2 times daily 15 g 1    triamcinolone (KENALOG) 0.1 % external ointment Apply topically every other day 80 g 3    Vitamin D3 50 mcg (2000 units) tablet Take 1 tablet by mouth every evening Takes in the afternoon       No current facility-administered medications for this visit.      Past Medical History:   Patient Active Problem List   Diagnosis    Low back pain    Mixed hyperlipidemia    Benign essential hypertension    Fibromyalgia    Allergic rhinitis    ANNETTE (obstructive sleep apnea)    Cavovarus deformity of foot    History of DVT (deep vein thrombosis)    Hypokalemia    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)    Anemia due to blood loss, acute    Postsurgical hypothyroidism    Type 2 diabetes, HbA1c goal < 7% (H)    Gastroparesis    Papillary carcinoma of thyroid (H)    Alopecia    Pulmonary nodule    Esophageal reflux    Dermatitis    Acne rosacea    Diabetic gastroparesis (H)    Poliomyelitis    Left knee pain    Carotid stenosis    Right shoulder pain    Type 2  diabetes mellitus with other specified complication (H)    Insufficiency, arterial, peripheral (H24)    Allergic dermatitis due to other chemical product    Normocytic anemia    Morbid obesity with BMI of 40.0-44.9, adult (H)    Mild major depression (H)    Chronic kidney disease, stage 3b (H)    Renal artery stenosis (H24)    Neuropathic pain    Mild major depression (H24)    Venous insufficiency    Avascular necrosis of bone of hip, left (H)    Contact dermatitis    Diabetic nephropathy associated with type 2 diabetes mellitus (H)    DVT of upper extremity (deep vein thrombosis) (H)    Dysphagia    Edema of lower extremity    History of colonic polyps    History of pulmonary embolus (PE)    Iron deficiency anemia    Inguinal pain    Moderate protein-calorie malnutrition (H24)    Osteoarthritis of left hip    Primary insomnia    Right lower quadrant pain    Status post total shoulder arthroplasty, left    Surgical aftercare, musculoskeletal system    Vitamin D deficiency    Lymphedema    Atypical chest pain    Episodic tension-type headache, not intractable    Blurred vision    Pain of cheek    Abdominal adhesions    Mixed incontinence    OAB (overactive bladder)    Cellulitis of lower extremity, unspecified laterality    Cellulitis    Acute chest pain    Venous stasis ulcer of left lower leg with edema of left lower leg (H)    Post-polio syndrome (H28)    Osteopenia of right hip    Spinal stenosis of lumbar region with neurogenic claudication     Past Medical History:   Diagnosis Date    Abdominal adhesions 1984, 96,99    s/p lysis    Abdominal adhesions     Acne rosacea     Allergic rhinitis     Allergic rhinitis, cause unspecified     Alopecia     Anemia     CAD (coronary artery disease)     Carotid stenosis     CKD (chronic kidney disease) stage 2, GFR 60-89 ml/min     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)     CPAP (continuous positive airway pressure) dependence     Depression     Diabetic gastroparesis (H)      Diet-controlled type 2 diabetes mellitus (H)     DM2 (diabetes mellitus, type 2) (H)     DVT (deep venous thrombosis) (H)     DVT of axillary vein, acute right (H)     Fibromyalgia     Gastro-oesophageal reflux disease     GERD (gastroesophageal reflux disease)     Hernia of unspecified site of abdominal cavity without mention of obstruction or gangrene     bilateral    Hernia, abdominal     History of blood transfusion     10/ 1980    History of thrombophlebitis     HTN (hypertension)     HTN (hypertension)     Hypertriglyceridemia     Hypertriglyceridemia     Hypokalemia     Hypothyroidism     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    Mumps     Obstructive sleep apnea     ANNETTE (obstructive sleep apnea)     ANNETTE on CPAP     Osteoarthritis of glenohumeral joint     Papillary carcinoma of thyroid (H)     s/p thyroidectomy - Ruegemer    Papillary carcinoma of thyroid (H)     PE (pulmonary embolism)     Poliomyelitis     poor circulation right leg    Poliomyelitis     Postsurgical hypothyroidism     s/p papillary thryoid cancer - Ruegemer    Pulmonary embolism (H)     Pulmonary embolus (H)     Pulmonary nodule     Rosacea     S/P carpal tunnel release     bilateral    S/P hardware removal 01/2014    still with lingering foot pain    S/P shoulder surgery     bilateral    Septic joint (H)     right knee    Septic joint of right knee joint (H)     Venous insufficiency     Venous insufficiency     Venous thrombosis 1999    right axillary vein       CC No referring provider defined for this encounter. on close of this encounter.

## 2024-07-29 NOTE — LETTER
7/29/2024       RE: Chasity Hodgson  [address] [place] [address]     Dear Colleague,    Thank you for referring your patient, Chasity Hodgson, to the [facility] [place] at [facility]. Please see a copy of my visit note below.    [facility] Dermatology Note  Encounter Date: Jul 29, 2024  Office Visit     Dermatology Problem List:  1. Female pattern hair loss  - Current tx: laser comb up to 3x weekly (switching to band 8/2021), free and clear shampoo, fluocinolone acetonide 0.01% oil once per week  - on oral minoxidil 1.25mg 2x weekly, hx of elevated Cr  - Baseline Hair Metrix 8/16/2021   2. Tinea corporis or candida/yeast infection under the breast  - OTC clomitrazole  3. Rosacea - stable  - Vanicream  4.  Venous stasis dermatitis - stable  - Vanicream  5. Allergic contact dermatitis - stable  - Uses fragrance-free products  - Patch testing revealed mild reaction to Balsam of [place], fragrance mix, and a strong reaction to paraphenylenediamine.   6. Xerosis cutis  -Recommend continuing use of a gentle, fragrance-free emollient based moisturizer such as Vanicream or CeraVe.   7. Abrasion , right upper arm 02/15/2021.   - Monitor, photo taken today.   8. Milia, right lateral eye  - Removed 8/16/2021   - prior tx: tretinoin  ____________________________________________    Assessment & Plan:   # Female pattern hair loss- improving  # Hx of allergic contact dermatitis of scalp  Hair loss is overall improving on oral minoxidil   - Fluocinolone 0.01% oil once per week for scalp pruritus and dryness.  - Continue to use laser headband or comb at least 3 times per week.  - Continue to use of Vanicream shampoo  - Increase oral minoxidil to 1.25mg 3x a week (patient currently taking twice weekly due to swelling) , monitor labs at next visit - only to increase if tolerated     #Lichen Simplex Chronicus  -Triamcinolone 0.1%ointment  to use on the back at night on weekdays and off on weekends. Continue Cerave moisturizer to area.     #Xerosis, bilateral arms  - Recommend Vaniply to arms daily. Patient to use CAMP irish for fragrance free moisturizers    Procedures Performed: None    Follow-up: 6 months    Staff and Resident  Medical Student: Yolanda Victor, MS4    Staff Physician:  I was present with the medical student who participated in the service and in the documentation of the note. I have verified the history and personally performed the physical exam and medical decision making. I agree with the assessment and plan of care as documented in the note.           Renetta Meyer MD  Professor   Department of Dermatology  [facility]: Phone: [phone], Fax:[phone]  [facility]: Phone: [phone], Fax: [phone]      ____________________________________________    CC: follow up for hair loss    HPI:  Ms. Chasity Hodgson is a 78 year old female who presents as a return patient for follow-up of hair loss, diagnosed as female pattern hair loss. Patient is on oral minoxidil 1.25 mg daily, reports some swelling in bilateral lower extremities and hands. Using laser comb 3x/week and fluocinolone oil once weekly. Reports increased shedding in the last few months, denies itching or flaking. Patient reports she has noticed increased itching and roughnesson her back. Patient scheduled for back surgery in October 2024 for foot paralysis.     [facility] she has seen.     Labs:  None reviewed    Physical Exam:  GEN: Well developed, well-nourished, in no acute distress, in a pleasant mood.    SKIN: Focused examination of scalp and face was performed.  - Blayne part width of 1.3/1.2 at front, 1.2 at vertex   - The layers of hair regrowth layers were noted to be  - 1-2 cm for  the first  - 2-3 cm at the second, robust  - 5-6 cm at the third, cut layer  - no diffuse erythema   - no perifollicular erythema  - no perifollicular scale   - no scaling of the scalp   - normal eyelash density  - normal eyebrow density  - negative hair pull test  - No other lesions of concern on areas examined.     Medications:  Current Outpatient Medications   Medication Sig Dispense Refill     acetaminophen (TYLENOL) 500 MG tablet Take 1,000 mg by mouth every 8 hours as needed       acetaminophen-caffeine (EXCEDRIN TENSION HEADACHE) 500-65 MG TABS Take 2 tablets by mouth every 6 hours as needed for mild pain 90 tablet 0     amLODIPine (NORVASC) 2.5 MG tablet Take 1 tablet (2.5 mg) by mouth every evening For blood pressure > 140 90 tablet 3     amoxicillin (AMOXIL) 500 MG capsule Takes 4 caps before every dental appointments.       aspirin (ASA) 81 MG EC tablet Take 81 mg by mouth daily (with lunch)       BIOTIN PO Take 1 capsule by mouth at bedtime Unknown dose       blood glucose (NO BRAND SPECIFIED) test strip Use to test blood sugar 1 times daily or as directed. 100 strip 1     calcium citrate 250 MG TABS Take 2 tablets by mouth daily       carvedilol (COREG) 12.5 MG tablet Take 1 tablet (12.5 mg) by mouth 2 times daily (with meals) 180 tablet 2     clotrimazole (LOTRIMIN) 1 % cream Apply topically as needed (rash/yeast infection)       Continuous Glucose Sensor (FREESTYLE BRANDY 3 SENSOR) MISC 1 each every 14 days Change every 14 days 6 each 3     Cyanocobalamin (B-12 PO) Take 1 tablet by mouth every evening Unknown dose. Takes in the afternoon       empagliflozin (JARDIANCE) 10 MG TABS tablet Take 1 tablet (10 mg) by mouth every evening 90 tablet 3     ezetimibe (ZETIA) 10 MG tablet Take 1 tablet (10 mg) by mouth every evening 90 tablet 3     famotidine (PEPCID) 40 MG tablet Take 40 mg by mouth daily       fenofibrate (TRICOR) 145 MG tablet Take 1 tablet (145 mg) by mouth daily 90 tablet 1     fexofenadine  (ALLEGRA) 180 MG tablet Take 180 mg by mouth daily (with lunch) Takes in the afternoon 120 0     fluocinolone acetonide (DERMA SMOOTHE/FS BODY) 0.01 % external oil Apply 2 mL to scalp once per week. Massage into scalp. Can be left in overnight or washed out after 4-6 hours. 118.28 mL 5     furosemide (LASIX) 20 MG tablet Take 1 tablet (20 mg) by mouth daily as needed (lower extremity edema) 90 tablet 3     icosapent ethyl (VASCEPA) 1 g CAPS capsule Take 1 g by mouth 2 times daily (with meals) 180 capsule 3     insulin aspart (NOVOLOG FLEXPEN) 100 UNIT/ML pen Inject 10 Units Subcutaneous daily before breakfast AND 12 Units daily (before supper). Breakfast and supper, pt eats minimal at lunch 6 mL 5     INSULIN GLARGINE 100 UNIT/ML pen Inject 20 Units Subcutaneous daily 15 mL 5     insulin pen needle (B-D U/F) 31G X 5 MM miscellaneous Use 4 pen needles daily or as directed. 200 each 3     ipratropium (ATROVENT) 0.03 % nasal spray Spray 1 spray in nostril every 8 hours       ketoconazole (NIZORAL) 2 % external cream Twice a day for severe flare 30 g 1     levothyroxine (SYNTHROID/LEVOTHROID) 112 MCG tablet Take 1 tablet (112 mcg) by mouth daily 90 tablet 1     Lidocaine (LIDOCARE) 4 % Patch Place 2-3 patches onto the skin every 24 hours To prevent lidocaine toxicity, patient should be patch free for 12 hrs daily.       methocarbamol (ROBAXIN) 500 MG tablet Take 1-2 tablets (500-1,000 mg) by mouth 4 times daily as needed for muscle spasms 30 tablet 1     minoxidil (LONITEN) 2.5 MG tablet Take half tablet (1.25 mg) by mouth once daily (Patient taking differently: Take 1.25 mg by mouth every morning Take half tablet (1.25 mg) by mouth once daily) 90 tablet 2     MULTIPLE VITAMIN PO Take 1 tablet by mouth daily (with lunch)       nitroGLYcerin (NITROSTAT) 0.4 MG sublingual tablet Place 1 tablet (0.4 mg) under the tongue every 5 minutes as needed for chest pain (no more than 3 in one hour; after 3rd, call 911.) 25 tablet 3      nystatin (MYCOSTATIN) cream Apply topically daily as needed (groin)       nystatin-triamcinolone (MYCOLOG II) cream Apply topically daily as needed (genital itching)       olmesartan (BENICAR) 40 MG tablet Take 1 tablet (40 mg) by mouth daily 90 tablet 3     ondansetron (ZOFRAN) 4 MG tablet Take 1 tablet (4 mg) by mouth every 6 hours as needed Reported on 3/20/2017 20 tablet 0     order for DME Equipment being ordered: Compression stockings - Knee High; 20-30 mmHg compression - note would like adhesive band to keep the stocking from sliding down 3 each 0     pantoprazole (PROTONIX) 40 MG EC tablet Take 40 mg by mouth 2 times daily       Polyethylene Glycol 400 (BLINK TEARS) 0.25 % GEL Apply 1 drop to eye at bedtime       polyethylene glycol 400 (BLINK TEARS) 0.25 % SOLN ophthalmic solution Place 1 drop into both eyes daily       pregabalin (LYRICA) 25 MG capsule Take 1 capsule (25 mg) by mouth 2 times daily 60 capsule 11     sucralfate (CARAFATE) 1 GM/10ML suspension Take 1 g by mouth 4 times daily as needed (GERD)       SYNTHROID 112 MCG tablet Take 1 tablet (112 mcg) by mouth daily 90 tablet 3     tretinoin (RETIN-A) 0.025 % external cream Apply topically nightly as needed (facial lesions) Use every night as tolerated - spot treat lesion       triamcinolone (KENALOG) 0.1 % external cream Apply topically 2 times daily 15 g 1     triamcinolone (KENALOG) 0.1 % external ointment Apply topically every other day 80 g 3     Vitamin D3 50 mcg (2000 units) tablet Take 1 tablet by mouth every evening Takes in the afternoon       No current facility-administered medications for this visit.      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)      Anemia due to blood loss, acute     Postsurgical hypothyroidism     Type 2 diabetes, HbA1c goal < 7% (H)     Gastroparesis     Papillary carcinoma of thyroid (H)     Alopecia     Pulmonary nodule     Esophageal reflux     Dermatitis     Acne rosacea     Diabetic gastroparesis (H)     Poliomyelitis     Left knee pain     Carotid stenosis     Right shoulder pain     Type 2 diabetes mellitus with other specified complication (H)     Insufficiency, arterial, peripheral (H24)     Allergic dermatitis due to other chemical product     Normocytic anemia     Morbid obesity with BMI of 40.0-44.9, adult (H)     Mild major depression (H)     Chronic kidney disease, stage 3b (H)     Renal artery stenosis (H24)     Neuropathic pain     Mild major depression (H24)     Venous insufficiency     Avascular necrosis of bone of hip, left (H)     Contact dermatitis     Diabetic nephropathy associated with type 2 diabetes mellitus (H)     DVT of upper extremity (deep vein thrombosis) (H)     Dysphagia     Edema of lower extremity     History of colonic polyps     History of pulmonary embolus (PE)     Iron deficiency anemia     Inguinal pain     Moderate protein-calorie malnutrition (H24)     Osteoarthritis of left hip     Primary insomnia     Right lower quadrant pain     Status post total shoulder arthroplasty, left     Surgical aftercare, musculoskeletal system     Vitamin D deficiency     Lymphedema     Atypical chest pain     Episodic tension-type headache, not intractable     Blurred vision     Pain of cheek     Abdominal adhesions     Mixed incontinence     OAB (overactive bladder)     Cellulitis of lower extremity, unspecified laterality     Cellulitis     Acute chest pain     Venous stasis ulcer of left lower leg with edema of left lower leg (H)     Post-polio syndrome (H28)     Osteopenia of right hip     Spinal stenosis of lumbar region with neurogenic claudication     Past Medical History:   Diagnosis Date     Abdominal  adhesions 1984, 96,99    s/p lysis     Abdominal adhesions      Acne rosacea      Allergic rhinitis      Allergic rhinitis, cause unspecified      Alopecia      Anemia      CAD (coronary artery disease)      Carotid stenosis      CKD (chronic kidney disease) stage 2, GFR 60-89 ml/min      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)      CPAP (continuous positive airway pressure) dependence      Depression      Diabetic gastroparesis (H)      Diet-controlled type 2 diabetes mellitus (H)      DM2 (diabetes mellitus, type 2) (H)      DVT (deep venous thrombosis) (H)      DVT of axillary vein, acute right (H)      Fibromyalgia      Gastro-oesophageal reflux disease      GERD (gastroesophageal reflux disease)      Hernia of unspecified site of abdominal cavity without mention of obstruction or gangrene     bilateral     Hernia, abdominal      History of blood transfusion     10/ 1980     History of thrombophlebitis      HTN (hypertension)      HTN (hypertension)      Hypertriglyceridemia      Hypertriglyceridemia      Hypokalemia      Hypothyroidism      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     Mumps      Obstructive sleep apnea      ANNETTE (obstructive sleep apnea)      ANNETTE on CPAP      Osteoarthritis of glenohumeral joint      Papillary carcinoma of thyroid (H)     s/p thyroidectomy - Ruegemer     Papillary carcinoma of thyroid (H)      PE (pulmonary embolism)      Poliomyelitis     poor circulation right leg     Poliomyelitis      Postsurgical hypothyroidism     s/p papillary thryoid cancer - Ruegemer     Pulmonary embolism (H)      Pulmonary embolus (H)      Pulmonary nodule      Rosacea      S/P carpal tunnel release     bilateral     S/P hardware removal 01/2014    still with lingering foot pain     S/P shoulder surgery     bilateral     Septic joint (H)     right knee     Septic joint of right knee joint (H)      Venous insufficiency      Venous insufficiency      Venous thrombosis 1999    right axillary vein       CC No referring  provider defined for this encounter. on close of this encounter.      Again, thank you for allowing me to participate in the care of your patient.      Sincerely,    Renetta Meyer MD

## 2024-07-29 NOTE — NURSING NOTE
Dermatology Rooming Note    Chasity Hodgson's goals for this visit include:   Chief Complaint   Patient presents with    Hair Loss     Sammie is here for a HL follow-up, states condition has      Albert Franklin, EMT

## 2024-07-29 NOTE — PATIENT INSTRUCTIONS
Visit [email or web address]  For list of fragrance free moisturizers or use CAMP irish on iphone     2. Start Triamcinolone 0.1% ointment to back nightly, off on weekends. Use moisutrizer to area     3. Start vaniply to arms

## 2024-07-30 ENCOUNTER — HOSPITAL ENCOUNTER (OUTPATIENT)
Dept: MAMMOGRAPHY | Facility: CLINIC | Age: 79
Discharge: HOME | End: 2024-07-30
Attending: PHYSICIAN ASSISTANT
Payer: MEDICARE

## 2024-07-30 DIAGNOSIS — N64.4 BREAST PAIN, LEFT: ICD-10-CM

## 2024-07-30 PROCEDURE — 77061 BREAST TOMOSYNTHESIS UNI: CPT | Mod: LT

## 2024-07-30 RX ORDER — OLMESARTAN MEDOXOMIL 40 MG/1
40 TABLET ORAL DAILY
Qty: 90 TABLET | Refills: 3 | Status: ON HOLD | OUTPATIENT
Start: 2024-07-30 | End: 2024-08-21

## 2024-07-30 RX ORDER — PREGABALIN 25 MG/1
25 CAPSULE ORAL DAILY PRN
Qty: 180 CAPSULE | Refills: 1 | Status: ON HOLD | OUTPATIENT
Start: 2024-07-30 | End: 2024-08-21

## 2024-07-30 RX ORDER — AMLODIPINE BESYLATE 2.5 MG/1
2.5 TABLET ORAL DAILY PRN
Qty: 90 TABLET | Refills: 3 | Status: SHIPPED | OUTPATIENT
Start: 2024-07-30

## 2024-07-30 NOTE — RESULT ENCOUNTER NOTE
Gerard Cochran,     Here are your mammogram results which are thankfully normal.  Please repeat in one year.      Please let us know if you have any questions or concerns.    Regards,  Maude Ortiz PA-C

## 2024-08-01 ENCOUNTER — MYC MEDICAL ADVICE (OUTPATIENT)
Dept: FAMILY MEDICINE | Facility: CLINIC | Age: 79
End: 2024-08-01
Payer: MEDICARE

## 2024-08-01 DIAGNOSIS — I89.0 LYMPHEDEMA: Primary | ICD-10-CM

## 2024-08-05 ENCOUNTER — MYC MEDICAL ADVICE (OUTPATIENT)
Dept: DERMATOLOGY | Facility: CLINIC | Age: 79
End: 2024-08-05
Payer: MEDICARE

## 2024-08-05 ENCOUNTER — TELEPHONE (OUTPATIENT)
Dept: DERMATOLOGY | Facility: CLINIC | Age: 79
End: 2024-08-05
Payer: MEDICARE

## 2024-08-05 RX ORDER — TRIAMCINOLONE ACETONIDE 1 MG/G
OINTMENT TOPICAL
Qty: 80 G | Refills: 2 | Status: SHIPPED | OUTPATIENT
Start: 2024-08-05 | End: 2024-08-18

## 2024-08-05 NOTE — TELEPHONE ENCOUNTER
[facility] Call Center    Phone Message    May a detailed message be left on voicemail: yes     Reason for Call: Other: Pt needs to schedule a 6 month follow-up. Please call. Okay to leave a voicemail    Action Taken: Message routed to:  [facility] Derm    Travel Screening: Not Applicable

## 2024-08-06 ENCOUNTER — MYC MEDICAL ADVICE (OUTPATIENT)
Dept: DERMATOLOGY | Facility: CLINIC | Age: 79
End: 2024-08-06
Payer: MEDICARE

## 2024-08-06 ENCOUNTER — PATIENT OUTREACH (OUTPATIENT)
Dept: CARE COORDINATION | Facility: CLINIC | Age: 79
End: 2024-08-06
Payer: MEDICARE

## 2024-08-06 NOTE — TELEPHONE ENCOUNTER
[employer] message sent to patient    Dr. Meyer has no availability in January 2025 at this time.    Jennifer Leos LPN

## 2024-08-06 NOTE — PROGRESS NOTES
Clinic Care Coordination Contact  Care Coordination Clinician Chart Review    Situation: Patient chart reviewed by Care Coordinator.       Background: Care Coordination Program started: 11/27/2023. Initial assessment completed and patient-centered care plan(s) were developed with participation from patient. Lead CC handed patient off to CHW for continued outreaches.       Assessment: Per chart review, patient outreach completed by CC CHW on 8/23/24.  Patient is actively working to accomplish goal(s). Patient's goal(s) appropriate and relevant at this time. Patient is not due for updated Plan of Care.  Assessments will be completed annually or as needed/with change of patient status.      Care Plan: Community Resources       Problem: Insufficient In-home support       Note:     Goal Statement: Sammie will continue working with Care Coordination to ensure her needs are met for overall health and wellbeing.  Date Goal Set: 12/5/23  Barriers: Limited finances  Strengths: Strong support system  Date to Achieve By: 12/5/24  Patient expressed understanding of goal: yes  Action steps to achieve this goal:  1. I will continue to follow up with medical team as needed  2. I will work with FRW on discussing and applying for E/W program  4. I will consider options for chore services in my home from CCSW and CHW  5. I will outreach to Care Coordination  for further questions or concerns          Goal: Establish adequate home support       This Visit's Progress: 50% Recent Progress: 40%                               Plan/Recommendations: The patient will continue working with Care Coordination to achieve goal(s) as above. CHW will continue outreaches at minimum every 30 days and will involve Lead CC as needed or if patient is ready to move to Maintenance. Lead CC will continue to monitor CHW outreaches and patient's progress to goal(s) every 6 weeks.     Plan of Care updated and sent to patient: Katie Hennessy   [facility]  Clinic Care Coordinator  [facility]  [phone]  [email or web address]

## 2024-08-09 NOTE — TELEPHONE ENCOUNTER
LVM for patient with appointment details    Asked her to return my call if the date doesn't work    Jennifer Leos LPN

## 2024-08-15 ENCOUNTER — NURSE TRIAGE (OUTPATIENT)
Dept: FAMILY MEDICINE | Facility: CLINIC | Age: 79
End: 2024-08-15
Payer: MEDICARE

## 2024-08-15 NOTE — TELEPHONE ENCOUNTER
"Nurse Triage SBAR    Is this a 2nd Level Triage? YES, LICENSED PRACTITIONER REVIEW IS REQUIRED    Situation: Pt calling in today with new abdominal pain in the left lower quadrant. Patient has sharp pain on the left side and left side under breast and left stomach. Patient describes pain as a 8-9 on a scale out of 10. She states this is new and sharp. Radiating to her back.     Patient referred to the ADS. Patient agreeable to go!     Background: Patient having new abdominal pain    Assessment: needs to be evaluated     Protocol Recommended Disposition:   Go to ED Now    Recommendation: Patient needs to be evaluated     Sent to ADS    Does the patient meet one of the following criteria for ADS visit consideration? 16+ years old, with an [facility] PCP     TIP  Providers, please consider if this condition is appropriate for management at one of our Acute and Diagnostic Services sites.     If patient is a good candidate, please use dotphrase <dot>triageresponse and select Refer to ADS to document.    APRYL Reyes  [facility] Triage     Reason for Disposition   SEVERE abdominal pain (e.g., excruciating)    Additional Information   Negative: Passed out (i.e., fainted, collapsed and was not responding)   Negative: Shock suspected (e.g., cold/pale/clammy skin, too weak to stand, low BP, rapid pulse)   Negative: Sounds like a life-threatening emergency to the triager   Negative: Followed an abdomen (stomach) injury   Negative: Chest pain   Negative: Abdominal pain and pregnant < 20 weeks   Negative: Abdominal pain and pregnant 20 or more weeks   Negative: Pain is mainly in upper abdomen (if needed ask: 'is it mainly above the belly button?')   Negative: Abdomen bloating or swelling are main symptoms    Answer Assessment - Initial Assessment Questions  1. LOCATION: \"Where does it hurt?\"       Hurts in stomach under left breast  left side back pain   2. RADIATION: \"Does the pain shoot anywhere else?\" (e.g., chest, back)     " " Back   3. ONSET: \"When did the pain begin?\" (e.g., minutes, hours or days ago)       Pain began a week and a hald   4. SUDDEN: \"Gradual or sudden onset?\"      Sudden   5. PATTERN \"Does the pain come and go, or is it constant?\"     - If it comes and goes: \"How long does it last?\" \"Do you have pain now?\"      (Note: Comes and goes means the pain is intermittent. It goes away completely between bouts.)     - If constant: \"Is it getting better, staying the same, or getting worse?\"       (Note: Constant means the pain never goes away completely; most serious pain is constant and gets worse.)       Comes and goes   6. SEVERITY: \"How bad is the pain?\"  (e.g., Scale 1-10; mild, moderate, or severe)     - MILD (1-3): Doesn't interfere with normal activities, abdomen soft and not tender to touch.      - MODERATE (4-7): Interferes with normal activities or awakens from sleep, abdomen tender to touch.      - SEVERE (8-10): Excruciating pain, doubled over, unable to do any normal activities.        8/10  7. RECURRENT SYMPTOM: \"Have you ever had this type of stomach pain before?\" If Yes, ask: \"When was the last time?\" and \"What happened that time?\"       no  8. CAUSE: \"What do you think is causing the stomach pain?\"      Randy   9. RELIEVING/AGGRAVATING FACTORS: \"What makes it better or worse?\" (e.g., antacids, bending or twisting motion, bowel movement)      Tylenol   10. OTHER SYMPTOMS: \"Do you have any other symptoms?\" (e.g., back pain, diarrhea, fever, urination pain, vomiting)        Back pain   11. PREGNANCY: \"Is there any chance you are pregnant?\" \"When was your last menstrual period?\"        no    Protocols used: Abdominal Pain - Female-A-OH    "

## 2024-08-16 ENCOUNTER — OFFICE VISIT (OUTPATIENT)
Dept: PEDIATRICS | Facility: CLINIC | Age: 79
End: 2024-08-16
Payer: MEDICARE

## 2024-08-16 VITALS
HEART RATE: 65 BPM | WEIGHT: 210.9 LBS | OXYGEN SATURATION: 100 % | RESPIRATION RATE: 16 BRPM | TEMPERATURE: 97.7 F | HEIGHT: 60 IN | DIASTOLIC BLOOD PRESSURE: 74 MMHG | SYSTOLIC BLOOD PRESSURE: 134 MMHG | BODY MASS INDEX: 41.4 KG/M2

## 2024-08-16 DIAGNOSIS — F43.21 GRIEF REACTION: ICD-10-CM

## 2024-08-16 DIAGNOSIS — M79.7 FIBROMYALGIA: ICD-10-CM

## 2024-08-16 DIAGNOSIS — F32.0 MILD MAJOR DEPRESSION (H): ICD-10-CM

## 2024-08-16 DIAGNOSIS — R10.12 LEFT UPPER QUADRANT PAIN: ICD-10-CM

## 2024-08-16 DIAGNOSIS — R61 NIGHT SWEATS: ICD-10-CM

## 2024-08-16 DIAGNOSIS — M54.6 ACUTE LEFT-SIDED THORACIC BACK PAIN: Primary | ICD-10-CM

## 2024-08-16 PROCEDURE — 99417 PROLNG OP E/M EACH 15 MIN: CPT | Performed by: PHYSICIAN ASSISTANT

## 2024-08-16 PROCEDURE — 99215 OFFICE O/P EST HI 40 MIN: CPT | Performed by: PHYSICIAN ASSISTANT

## 2024-08-16 NOTE — PROGRESS NOTES
Acute and Diagnostic Services Clinic Visit    {PROVIDER CHARTING PREFERENCE:158542}    Raheem Cochran is a 78 year old, presenting for the following health issues:  No chief complaint on file.    HPI     Abdominal/Flank Pain  Onset/Duration: 1.5 weeks  Description:   Character: Sharp  Location: left upper quadrant  Radiation: Back  Intensity: moderate, severe  Progression of Symptoms:  worsening  Accompanying Signs & Symptoms:  Fever/chills: no   Gas/Bloating: no   Nausea: YES  Vomitting: no   Diarrhea: no   Constipation:no   Dysuria: no            Hematuria: no            Frequency: no            Incontinence of urine: no   History:            Last bowel movement: today  Trauma: no   Previous similar pain: no    Previous tests done: none           Previous Abdominal surgery: YES- appendix and gallbladder removed, hysterectomy  Precipitating factors:   Does the pain change with:     Food: YES     Bowel Movement: no     Urination: no              Other factors: no   Therapies tried and outcome:  Tylenol, Pregablin    When food last eaten: last night      {ROS Picklists (Optional):574251}      Objective    LMP  (LMP Unknown)   There is no height or weight on file to calculate BMI.  Physical Exam   {Exam List (Optional):375890}    {Diagnostic Test Results (Optional):942533}        Signed Electronically by: Jessica Dawson PA-C  {Email feedback regarding this note to [email or web address]   :[id number]}

## 2024-08-16 NOTE — Clinical Note
Gerard Jolley,   Please see my note from Sammie's ADS visit today. I'm not sure what the next best steps for her might be and feel her mental health (+/- fibromyalgia) may be contributing to her pain,  but with the associated night sweats also don't want to overlook anything.   Thank you, Jessica

## 2024-08-16 NOTE — PATIENT INSTRUCTIONS
Please see your primary care provider soon to discuss further evaluation of your symptoms, as well as depression/grief reaction.   We will try a 5 day course of prednisone today.

## 2024-08-16 NOTE — PROGRESS NOTES
"  Assessment/Plan:    Patient's symptoms have been ongoing for several months & are no different/not acutely worse than they have been.   No SOB or pleuritic pain to make me suspect PE. No spinous process tenderness or s/sx cauda equina to warrant emergent MRI. She has generalized left sided abdominal tenderness but no peritoneal signs, and she is afebrile with stable vital signs.   I do not feel that another CT abd/pelvis would be useful.   Unclear etiology for her symptoms but with the night sweats, I am concerned about possible undiagnosed malignancy. Will send note to her PCP for further recommendations given all of her recent work-ups.   Differential diagnosis also includes GERD, gastroparesis, IBS, thoracic radiculopathy/other neuropathic pain, fibromyalgia.   As she has a history of lumbar spine stenosis and pain is sharp/shooting, do feel that thoracic radiculopathy may be a possibility. Will do trial of a short course of prednisone, and advised she schedule an appointment with her spine specialist. MRI of the spine may be indicated, but unfortunately we do not have MRI capability today in the [facility].    Sammie is understandably upset today as she is frustrated with this ongoing pain, and she recently lost her . I am concerned about her mental health. I recommended seeing her primary care provider to discuss this further, and offered a referral for mental health therapy. She declines this. I encouraged her to confide in clergy (if Mandaen), family, and friends and lean on them for support. Her reply was \"no one cares\". She denies suicidal ideation, however. I did advise that she can always present to the ER if she feels she can no longer handle the pain.  It is also possible that her depression/grief may be exacerbating her pain and there may be a fibromyalgia component to this. I did offer a referral to pain management, but she declines.    See patient instructions below.    At the end of the encounter, " I discussed results, diagnosis, medications. Discussed red flags for immediate return to clinic/ER, as well as indications for follow up if no improvement. Patient understood and agreed to plan. Patient was stable for discharge.    75 minutes was spent preparing for the visit and reviewing the patient's chart; getting a history and performing an exam; counseling and providing education to the patient, family, or caregiver; ordering medicines and/or tests; communicating with other healthcare professionals; documenting information in the medical record; interpreting results and sharing that information with the patient, family, or caregiver; and care coordination.     ICD-10-CM    1. Acute left-sided thoracic back pain  M54.6       2. Left upper quadrant pain  R10.12       3. Fibromyalgia  M79.7       4. Grief reaction  F43.21       5. Mild major depression (H24)  F32.0       6. Night sweats  R61             Return in about 3 days (around 8/19/2024) for follow up with spine specialist.    YULIYA Estrada, [facility]  -----------------------------------------------------------------------------------------------------------------------------------------------------    HPI:  Chasity Hodgson is a 78 year old female with history of post polio syndrome, fibromyalgia, neuropathic pain, spinal stenosis of lumbar spine, type 2 DM, gastroparesis, GERD, stage 3 CKD, renal artery stenosis, DVT/PE who presents for evaluation of pain in her left upper abdomen that radiates into the left mid back as well as left chest at times. She also reports associated nausea. She has been experiencing this pain for about 2.5 months now. The pain is primarily in the abdomen currently but there was a period of time where it was primarily in the left chest and into the left axilla. Pain comes & goes and is sharp, stabbing, & lasts for a few seconds at a time. She does have a history of somewhat chronic  abdominal pain/nausea which she has been seeing Dr. Pantoja at [facility] for in the last 9 months. She is on Pepcid, Protonix, & Carafate. She had upper endoscopy on 6/12/24 which showed non-erosive gastropathy & a benign polyp. Had negative upper GI series and H. Pylori testing. She also had a CT abd/pelvis on 7/3/24 and diagnostic mammogram on 7/30/24 which were both unremarkable aside from hepatic steatosis. She is on Lyrica for neuropathic pain; doesn't notice any improvement with this. She has been prescribed Robaxin for this, which didn't help. Has also tried Tylenol. Denies rashes prior to symptom onset, cough, shortness of breath, pleuritic chest pain, hemoptysis, fevers, constipation, diarrhea, vomiting, or urinary symptoms. She has an ostomy and notes normal output with this. Notes no major changes to blood sugars.    She also notes night sweats & fatigue for the last 3.5 months. She reports feeling warm at night and waking up in the morning feeling very sweaty and her pajamas will be wet. She denies unintended weight loss. Saw primary care on 5/24 for this, had CBC, TB, CMP, and TSH tests which showed slightly decreased kidney function but were otherwise unremarkable.. She had labs done again on 7/3/24 which showed renal function had returned to baseline.       She has a history of depression, is not on medication management for this & does not see a therapist. Her  passed away about 2 weeks ago.    Past Medical History:   Diagnosis Date    Abdominal adhesions 1984, 96,99    s/p lysis    Abdominal adhesions     Acne rosacea     Allergic rhinitis     Allergic rhinitis, cause unspecified     Alopecia     Anemia     CAD (coronary artery disease)     Carotid stenosis     CKD (chronic kidney disease) stage 2, GFR 60-89 ml/min     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)     CPAP (continuous positive airway pressure) dependence     Depression     Diabetic gastroparesis (H)     Diet-controlled type 2 diabetes mellitus (H)      DM2 (diabetes mellitus, type 2) (H)     DVT (deep venous thrombosis) (H)     DVT of axillary vein, acute right (H)     Fibromyalgia     Gastro-oesophageal reflux disease     GERD (gastroesophageal reflux disease)     Hernia of unspecified site of abdominal cavity without mention of obstruction or gangrene     bilateral    Hernia, abdominal     History of blood transfusion     10/ 1980    History of thrombophlebitis     HTN (hypertension)     HTN (hypertension)     Hypertriglyceridemia     Hypertriglyceridemia     Hypokalemia     Hypothyroidism     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    Mumps     Obstructive sleep apnea     ANNETTE (obstructive sleep apnea)     ANNETTE on CPAP     Osteoarthritis of glenohumeral joint     Papillary carcinoma of thyroid (H)     s/p thyroidectomy - Ruegemer    Papillary carcinoma of thyroid (H)     PE (pulmonary embolism)     Poliomyelitis     poor circulation right leg    Poliomyelitis     Postsurgical hypothyroidism     s/p papillary thryoid cancer - Ruegemer    Pulmonary embolism (H)     Pulmonary embolus (H)     Pulmonary nodule     Rosacea     S/P carpal tunnel release     bilateral    S/P hardware removal 01/2014    still with lingering foot pain    S/P shoulder surgery     bilateral    Septic joint (H)     right knee    Septic joint of right knee joint (H)     Venous insufficiency     Venous insufficiency     Venous thrombosis 1999    right axillary vein       Vitals:    08/16/24 1047   BP: 134/74   BP Location: Left arm   Patient Position: Sitting   Cuff Size: Adult Regular   Pulse: 65   Resp: 16   Temp: 97.7  F (36.5  C)   SpO2: 100%   Weight: 95.7 kg (210 lb 14.4 oz)   Height: 1.524 m (5')       Physical Exam  Vitals and nursing note reviewed.   Cardiovascular:      Rate and Rhythm: Normal rate and regular rhythm.      Heart sounds: Normal heart sounds.   Pulmonary:      Effort: Pulmonary effort is normal.      Breath sounds: Normal breath sounds.   Chest:      Chest wall: No  tenderness.   Abdominal:       Musculoskeletal:      Thoracic back: Tenderness present. No bony tenderness.        Back:    Neurological:      Mental Status: She is alert.   Psychiatric:         Mood and Affect: Mood is depressed. Affect is tearful.         Labs/Imaging:  No results found. However, due to the size of the patient record, not all encounters were searched. Please check Results Review for a complete set of results.  No results found for this or any previous visit (from the past 24 hour(s)).        Patient Instructions   Please see your primary care provider soon to discuss further evaluation of your symptoms, as well as depression/grief reaction.   We will try a 5 day course of prednisone today.

## 2024-08-17 ENCOUNTER — APPOINTMENT (OUTPATIENT)
Dept: GENERAL RADIOLOGY | Facility: CLINIC | Age: 79
DRG: 563 | End: 2024-08-17
Attending: EMERGENCY MEDICINE
Payer: MEDICARE

## 2024-08-17 ENCOUNTER — APPOINTMENT (OUTPATIENT)
Dept: ULTRASOUND IMAGING | Facility: CLINIC | Age: 79
DRG: 563 | End: 2024-08-17
Attending: EMERGENCY MEDICINE
Payer: MEDICARE

## 2024-08-17 ENCOUNTER — HOSPITAL ENCOUNTER (INPATIENT)
Facility: CLINIC | Age: 79
LOS: 3 days | Discharge: NURSING, REHAB OR LONG-TERM CARE | DRG: 563 | End: 2024-08-21
Attending: EMERGENCY MEDICINE | Admitting: HOSPITALIST
Payer: MEDICARE

## 2024-08-17 ENCOUNTER — TRANSFERRED RECORDS (OUTPATIENT)
Dept: ORTHOPEDICS | Facility: CLINIC | Age: 79
End: 2024-08-17

## 2024-08-17 DIAGNOSIS — E11.9 TYPE 2 DIABETES, HBA1C GOAL < 7% (H): ICD-10-CM

## 2024-08-17 DIAGNOSIS — R10.9 FLANK PAIN: ICD-10-CM

## 2024-08-17 DIAGNOSIS — M25.512 ACUTE PAIN OF LEFT SHOULDER: ICD-10-CM

## 2024-08-17 DIAGNOSIS — M79.622 PAIN OF LEFT UPPER ARM: Primary | ICD-10-CM

## 2024-08-17 DIAGNOSIS — K59.03 DRUG-INDUCED CONSTIPATION: ICD-10-CM

## 2024-08-17 DIAGNOSIS — R12 HEARTBURN: ICD-10-CM

## 2024-08-17 LAB
ERYTHROCYTE [DISTWIDTH] IN BLOOD BY AUTOMATED COUNT: 12.2 % (ref 10–15)
HCT VFR BLD AUTO: 33.4 % (ref 35–47)
HGB BLD-MCNC: 10.9 G/DL (ref 11.7–15.7)
HOLD SPECIMEN: NORMAL
HOLD SPECIMEN: NORMAL
MCH RBC QN AUTO: 30.4 PG (ref 26.5–33)
MCHC RBC AUTO-ENTMCNC: 32.6 G/DL (ref 31.5–36.5)
MCV RBC AUTO: 93 FL (ref 78–100)
PLATELET # BLD AUTO: 205 10E3/UL (ref 150–450)
RBC # BLD AUTO: 3.58 10E6/UL (ref 3.8–5.2)
WBC # BLD AUTO: 7.4 10E3/UL (ref 4–11)

## 2024-08-17 PROCEDURE — 36415 COLL VENOUS BLD VENIPUNCTURE: CPT | Performed by: EMERGENCY MEDICINE

## 2024-08-17 PROCEDURE — 93971 EXTREMITY STUDY: CPT | Mod: LT

## 2024-08-17 PROCEDURE — 250N000011 HC RX IP 250 OP 636: Performed by: EMERGENCY MEDICINE

## 2024-08-17 PROCEDURE — 80048 BASIC METABOLIC PNL TOTAL CA: CPT | Performed by: HOSPITALIST

## 2024-08-17 PROCEDURE — 85027 COMPLETE CBC AUTOMATED: CPT | Performed by: HOSPITALIST

## 2024-08-17 PROCEDURE — 73030 X-RAY EXAM OF SHOULDER: CPT | Mod: LT

## 2024-08-17 PROCEDURE — 99285 EMERGENCY DEPT VISIT HI MDM: CPT | Mod: 25

## 2024-08-17 PROCEDURE — 73060 X-RAY EXAM OF HUMERUS: CPT | Mod: LT

## 2024-08-17 PROCEDURE — G0378 HOSPITAL OBSERVATION PER HR: HCPCS

## 2024-08-17 PROCEDURE — 99223 1ST HOSP IP/OBS HIGH 75: CPT | Mod: AI | Performed by: HOSPITALIST

## 2024-08-17 PROCEDURE — 96374 THER/PROPH/DIAG INJ IV PUSH: CPT

## 2024-08-17 PROCEDURE — 96375 TX/PRO/DX INJ NEW DRUG ADDON: CPT

## 2024-08-17 PROCEDURE — 250N000013 HC RX MED GY IP 250 OP 250 PS 637: Performed by: EMERGENCY MEDICINE

## 2024-08-17 RX ORDER — MORPHINE SULFATE 4 MG/ML
4 INJECTION, SOLUTION INTRAMUSCULAR; INTRAVENOUS ONCE
Status: COMPLETED | OUTPATIENT
Start: 2024-08-17 | End: 2024-08-17

## 2024-08-17 RX ORDER — AMOXICILLIN 250 MG
2 CAPSULE ORAL 2 TIMES DAILY PRN
Status: DISCONTINUED | OUTPATIENT
Start: 2024-08-17 | End: 2024-08-21 | Stop reason: HOSPADM

## 2024-08-17 RX ORDER — HYDROXYZINE HYDROCHLORIDE 50 MG/1
50 TABLET, FILM COATED ORAL EVERY 6 HOURS PRN
Status: DISCONTINUED | OUTPATIENT
Start: 2024-08-17 | End: 2024-08-21 | Stop reason: HOSPADM

## 2024-08-17 RX ORDER — ONDANSETRON 2 MG/ML
4 INJECTION INTRAMUSCULAR; INTRAVENOUS ONCE
Status: COMPLETED | OUTPATIENT
Start: 2024-08-17 | End: 2024-08-17

## 2024-08-17 RX ORDER — AMOXICILLIN 250 MG
1 CAPSULE ORAL 2 TIMES DAILY PRN
Status: DISCONTINUED | OUTPATIENT
Start: 2024-08-17 | End: 2024-08-21 | Stop reason: HOSPADM

## 2024-08-17 RX ORDER — POLYETHYLENE GLYCOL 3350 17 G/17G
17 POWDER, FOR SOLUTION ORAL 2 TIMES DAILY PRN
Status: DISCONTINUED | OUTPATIENT
Start: 2024-08-17 | End: 2024-08-21 | Stop reason: HOSPADM

## 2024-08-17 RX ORDER — ONDANSETRON 2 MG/ML
4 INJECTION INTRAMUSCULAR; INTRAVENOUS EVERY 6 HOURS PRN
Status: DISCONTINUED | OUTPATIENT
Start: 2024-08-17 | End: 2024-08-21 | Stop reason: HOSPADM

## 2024-08-17 RX ORDER — LIDOCAINE 4 G/G
2 PATCH TOPICAL
Status: DISCONTINUED | OUTPATIENT
Start: 2024-08-18 | End: 2024-08-21 | Stop reason: HOSPADM

## 2024-08-17 RX ORDER — ACETAMINOPHEN 650 MG/1
650 SUPPOSITORY RECTAL EVERY 4 HOURS PRN
Status: DISCONTINUED | OUTPATIENT
Start: 2024-08-17 | End: 2024-08-21 | Stop reason: HOSPADM

## 2024-08-17 RX ORDER — DEXTROSE MONOHYDRATE 25 G/50ML
25-50 INJECTION, SOLUTION INTRAVENOUS
Status: DISCONTINUED | OUTPATIENT
Start: 2024-08-17 | End: 2024-08-21 | Stop reason: HOSPADM

## 2024-08-17 RX ORDER — LANOLIN ALCOHOL/MO/W.PET/CERES
3 CREAM (GRAM) TOPICAL
Status: DISCONTINUED | OUTPATIENT
Start: 2024-08-17 | End: 2024-08-21 | Stop reason: HOSPADM

## 2024-08-17 RX ORDER — HYDROMORPHONE HCL IN WATER/PF 6 MG/30 ML
0.2 PATIENT CONTROLLED ANALGESIA SYRINGE INTRAVENOUS
Status: DISCONTINUED | OUTPATIENT
Start: 2024-08-17 | End: 2024-08-21 | Stop reason: HOSPADM

## 2024-08-17 RX ORDER — ONDANSETRON 4 MG/1
4 TABLET, ORALLY DISINTEGRATING ORAL EVERY 6 HOURS PRN
Status: DISCONTINUED | OUTPATIENT
Start: 2024-08-17 | End: 2024-08-21 | Stop reason: HOSPADM

## 2024-08-17 RX ORDER — NICOTINE POLACRILEX 4 MG
15-30 LOZENGE BUCCAL
Status: DISCONTINUED | OUTPATIENT
Start: 2024-08-17 | End: 2024-08-21 | Stop reason: HOSPADM

## 2024-08-17 RX ORDER — ACETAMINOPHEN 325 MG/1
650 TABLET ORAL EVERY 4 HOURS PRN
Status: DISCONTINUED | OUTPATIENT
Start: 2024-08-17 | End: 2024-08-21 | Stop reason: HOSPADM

## 2024-08-17 RX ORDER — HYDROXYZINE HYDROCHLORIDE 25 MG/1
25 TABLET, FILM COATED ORAL EVERY 6 HOURS PRN
Status: DISCONTINUED | OUTPATIENT
Start: 2024-08-17 | End: 2024-08-21 | Stop reason: HOSPADM

## 2024-08-17 RX ORDER — OXYCODONE HYDROCHLORIDE 5 MG/1
5 TABLET ORAL ONCE
Status: COMPLETED | OUTPATIENT
Start: 2024-08-17 | End: 2024-08-17

## 2024-08-17 RX ORDER — OXYCODONE HYDROCHLORIDE 5 MG/1
5 TABLET ORAL EVERY 4 HOURS PRN
Status: DISCONTINUED | OUTPATIENT
Start: 2024-08-17 | End: 2024-08-21 | Stop reason: HOSPADM

## 2024-08-17 RX ORDER — HYDROMORPHONE HCL IN WATER/PF 6 MG/30 ML
0.4 PATIENT CONTROLLED ANALGESIA SYRINGE INTRAVENOUS
Status: DISCONTINUED | OUTPATIENT
Start: 2024-08-17 | End: 2024-08-21 | Stop reason: HOSPADM

## 2024-08-17 RX ADMIN — MORPHINE SULFATE 4 MG: 4 INJECTION, SOLUTION INTRAMUSCULAR; INTRAVENOUS at 22:34

## 2024-08-17 RX ADMIN — OXYCODONE HYDROCHLORIDE 5 MG: 5 TABLET ORAL at 20:58

## 2024-08-17 RX ADMIN — ONDANSETRON 4 MG: 2 INJECTION INTRAMUSCULAR; INTRAVENOUS at 22:35

## 2024-08-17 ASSESSMENT — ACTIVITIES OF DAILY LIVING (ADL)
ADLS_ACUITY_SCORE: 43

## 2024-08-18 ENCOUNTER — APPOINTMENT (OUTPATIENT)
Dept: MRI IMAGING | Facility: CLINIC | Age: 79
DRG: 563 | End: 2024-08-18
Attending: PHYSICIAN ASSISTANT
Payer: MEDICARE

## 2024-08-18 LAB
ANION GAP SERPL CALCULATED.3IONS-SCNC: 14 MMOL/L (ref 7–15)
BUN SERPL-MCNC: 39.4 MG/DL (ref 8–23)
CALCIUM SERPL-MCNC: 8.8 MG/DL (ref 8.8–10.4)
CHLORIDE SERPL-SCNC: 106 MMOL/L (ref 98–107)
CREAT SERPL-MCNC: 1.52 MG/DL (ref 0.51–0.95)
EGFRCR SERPLBLD CKD-EPI 2021: 35 ML/MIN/1.73M2
GLUCOSE BLDC GLUCOMTR-MCNC: 100 MG/DL (ref 70–99)
GLUCOSE BLDC GLUCOMTR-MCNC: 102 MG/DL (ref 70–99)
GLUCOSE BLDC GLUCOMTR-MCNC: 110 MG/DL (ref 70–99)
GLUCOSE BLDC GLUCOMTR-MCNC: 112 MG/DL (ref 70–99)
GLUCOSE BLDC GLUCOMTR-MCNC: 80 MG/DL (ref 70–99)
GLUCOSE BLDC GLUCOMTR-MCNC: 82 MG/DL (ref 70–99)
GLUCOSE BLDC GLUCOMTR-MCNC: 86 MG/DL (ref 70–99)
GLUCOSE SERPL-MCNC: 108 MG/DL (ref 70–99)
HCO3 SERPL-SCNC: 21 MMOL/L (ref 22–29)
POTASSIUM SERPL-SCNC: 4.5 MMOL/L (ref 3.4–5.3)
SODIUM SERPL-SCNC: 141 MMOL/L (ref 135–145)

## 2024-08-18 PROCEDURE — 96375 TX/PRO/DX INJ NEW DRUG ADDON: CPT

## 2024-08-18 PROCEDURE — 272N000727 HC KIT, CATH 5FR  DUAL LUMEN POWERMIDLINE

## 2024-08-18 PROCEDURE — 250N000011 HC RX IP 250 OP 636: Performed by: HOSPITALIST

## 2024-08-18 PROCEDURE — 250N000009 HC RX 250: Performed by: INTERNAL MEDICINE

## 2024-08-18 PROCEDURE — G0378 HOSPITAL OBSERVATION PER HR: HCPCS

## 2024-08-18 PROCEDURE — 99232 SBSQ HOSP IP/OBS MODERATE 35: CPT | Performed by: INTERNAL MEDICINE

## 2024-08-18 PROCEDURE — 96376 TX/PRO/DX INJ SAME DRUG ADON: CPT

## 2024-08-18 PROCEDURE — 250N000013 HC RX MED GY IP 250 OP 250 PS 637: Performed by: INTERNAL MEDICINE

## 2024-08-18 PROCEDURE — 73221 MRI JOINT UPR EXTREM W/O DYE: CPT | Mod: LT,MG

## 2024-08-18 PROCEDURE — 250N000013 HC RX MED GY IP 250 OP 250 PS 637: Performed by: HOSPITALIST

## 2024-08-18 PROCEDURE — 120N000001 HC R&B MED SURG/OB

## 2024-08-18 PROCEDURE — 82962 GLUCOSE BLOOD TEST: CPT

## 2024-08-18 PROCEDURE — 36569 INSJ PICC 5 YR+ W/O IMAGING: CPT

## 2024-08-18 RX ORDER — CARVEDILOL 6.25 MG/1
12.5 TABLET ORAL 2 TIMES DAILY WITH MEALS
Status: DISCONTINUED | OUTPATIENT
Start: 2024-08-18 | End: 2024-08-21 | Stop reason: HOSPADM

## 2024-08-18 RX ORDER — NALOXONE HYDROCHLORIDE 0.4 MG/ML
0.2 INJECTION, SOLUTION INTRAMUSCULAR; INTRAVENOUS; SUBCUTANEOUS
Status: DISCONTINUED | OUTPATIENT
Start: 2024-08-18 | End: 2024-08-21 | Stop reason: HOSPADM

## 2024-08-18 RX ORDER — LEVOTHYROXINE SODIUM 112 UG/1
112 TABLET ORAL DAILY
Status: DISCONTINUED | OUTPATIENT
Start: 2024-08-18 | End: 2024-08-21 | Stop reason: HOSPADM

## 2024-08-18 RX ORDER — KETOCONAZOLE 20 MG/G
CREAM TOPICAL 2 TIMES DAILY PRN
COMMUNITY

## 2024-08-18 RX ORDER — ACETAMINOPHEN 500 MG
1000 TABLET ORAL 4 TIMES DAILY
Status: DISCONTINUED | OUTPATIENT
Start: 2024-08-18 | End: 2024-08-21 | Stop reason: HOSPADM

## 2024-08-18 RX ORDER — NALOXONE HYDROCHLORIDE 0.4 MG/ML
0.4 INJECTION, SOLUTION INTRAMUSCULAR; INTRAVENOUS; SUBCUTANEOUS
Status: DISCONTINUED | OUTPATIENT
Start: 2024-08-18 | End: 2024-08-21 | Stop reason: HOSPADM

## 2024-08-18 RX ORDER — MINOXIDIL 2.5 MG/1
0.25 TABLET ORAL
COMMUNITY

## 2024-08-18 RX ORDER — ASPIRIN 81 MG/1
81 TABLET ORAL
Status: DISCONTINUED | OUTPATIENT
Start: 2024-08-18 | End: 2024-08-21 | Stop reason: HOSPADM

## 2024-08-18 RX ORDER — AMLODIPINE BESYLATE 2.5 MG/1
2.5 TABLET ORAL DAILY PRN
Status: DISCONTINUED | OUTPATIENT
Start: 2024-08-18 | End: 2024-08-21 | Stop reason: HOSPADM

## 2024-08-18 RX ORDER — FENOFIBRATE 145 MG/1
145 TABLET, COATED ORAL AT BEDTIME
COMMUNITY

## 2024-08-18 RX ORDER — TRIAMCINOLONE ACETONIDE 0.25 MG/G
OINTMENT TOPICAL 2 TIMES DAILY PRN
COMMUNITY

## 2024-08-18 RX ORDER — METHOCARBAMOL 500 MG/1
500-1000 TABLET, FILM COATED ORAL 4 TIMES DAILY PRN
Status: DISCONTINUED | OUTPATIENT
Start: 2024-08-18 | End: 2024-08-21 | Stop reason: HOSPADM

## 2024-08-18 RX ORDER — LANOLIN ALCOHOL/MO/W.PET/CERES
1000 CREAM (GRAM) TOPICAL EVERY EVENING
COMMUNITY

## 2024-08-18 RX ORDER — ICOSAPENT ETHYL 1 G/1
2 CAPSULE ORAL EVERY EVENING
COMMUNITY

## 2024-08-18 RX ORDER — FLUOCINOLONE ACETONIDE 0.11 MG/ML
OIL TOPICAL PRN
Status: ON HOLD | COMMUNITY
End: 2024-08-21

## 2024-08-18 RX ADMIN — CARVEDILOL 12.5 MG: 6.25 TABLET, FILM COATED ORAL at 14:51

## 2024-08-18 RX ADMIN — CARVEDILOL 12.5 MG: 6.25 TABLET, FILM COATED ORAL at 21:22

## 2024-08-18 RX ADMIN — HYDROMORPHONE HYDROCHLORIDE 0.2 MG: 0.2 INJECTION, SOLUTION INTRAMUSCULAR; INTRAVENOUS; SUBCUTANEOUS at 07:43

## 2024-08-18 RX ADMIN — ACETAMINOPHEN 1000 MG: 500 TABLET, FILM COATED ORAL at 15:55

## 2024-08-18 RX ADMIN — HYDROMORPHONE HYDROCHLORIDE 0.2 MG: 0.2 INJECTION, SOLUTION INTRAMUSCULAR; INTRAVENOUS; SUBCUTANEOUS at 00:48

## 2024-08-18 RX ADMIN — LEVOTHYROXINE SODIUM 112 MCG: 0.11 TABLET ORAL at 14:51

## 2024-08-18 RX ADMIN — HYDROMORPHONE HYDROCHLORIDE 0.2 MG: 0.2 INJECTION, SOLUTION INTRAMUSCULAR; INTRAVENOUS; SUBCUTANEOUS at 04:15

## 2024-08-18 RX ADMIN — METHOCARBAMOL 500 MG: 500 TABLET ORAL at 14:50

## 2024-08-18 RX ADMIN — ACETAMINOPHEN 1000 MG: 500 TABLET, FILM COATED ORAL at 19:31

## 2024-08-18 RX ADMIN — LIDOCAINE 2 PATCH: 4 PATCH TOPICAL at 07:43

## 2024-08-18 RX ADMIN — OXYCODONE HYDROCHLORIDE 5 MG: 5 TABLET ORAL at 15:54

## 2024-08-18 RX ADMIN — HYDROMORPHONE HYDROCHLORIDE 0.2 MG: 0.2 INJECTION, SOLUTION INTRAMUSCULAR; INTRAVENOUS; SUBCUTANEOUS at 10:25

## 2024-08-18 RX ADMIN — LIDOCAINE HYDROCHLORIDE 2 ML: 10 INJECTION, SOLUTION EPIDURAL; INFILTRATION; INTRACAUDAL; PERINEURAL at 18:05

## 2024-08-18 RX ADMIN — OXYCODONE HYDROCHLORIDE 2.5 MG: 5 TABLET ORAL at 11:53

## 2024-08-18 RX ADMIN — ASPIRIN 81 MG: 81 TABLET, COATED ORAL at 14:51

## 2024-08-18 RX ADMIN — ACETAMINOPHEN 1000 MG: 500 TABLET, FILM COATED ORAL at 14:51

## 2024-08-18 RX ADMIN — HYDROMORPHONE HYDROCHLORIDE 0.2 MG: 0.2 INJECTION, SOLUTION INTRAMUSCULAR; INTRAVENOUS; SUBCUTANEOUS at 22:04

## 2024-08-18 ASSESSMENT — ACTIVITIES OF DAILY LIVING (ADL)
ADLS_ACUITY_SCORE: 53
ADLS_ACUITY_SCORE: 43
ADLS_ACUITY_SCORE: 54
ADLS_ACUITY_SCORE: 53
ADLS_ACUITY_SCORE: 53
ADLS_ACUITY_SCORE: 43
ADLS_ACUITY_SCORE: 43
ADLS_ACUITY_SCORE: 49
ADLS_ACUITY_SCORE: 53
ADLS_ACUITY_SCORE: 43
ADLS_ACUITY_SCORE: 49
ADLS_ACUITY_SCORE: 43
ADLS_ACUITY_SCORE: 43
ADLS_ACUITY_SCORE: 53
ADLS_ACUITY_SCORE: 43
ADLS_ACUITY_SCORE: 49
ADLS_ACUITY_SCORE: 43
ADLS_ACUITY_SCORE: 54
ADLS_ACUITY_SCORE: 43
ADLS_ACUITY_SCORE: 43
ADLS_ACUITY_SCORE: 53

## 2024-08-18 NOTE — ED TRIAGE NOTES
Pt BIBA from home. Pt was trying to check BP on left wrist, when wrapping the velco she twisted her arm and then felt a sharp shooting pain on the left arm from elbow to shoulder. Needed to support arm to have any relief. EMS gave 10 mcg of fentanyl, only helped a little. Pt  comes in with sling from EMS. Now reports pain is going across entire left arm. VSS    Pitting edema in bilateral legs      Triage Assessment (Adult)       Row Name 08/17/24 1959          Triage Assessment    Airway WDL WDL        Respiratory WDL    Respiratory WDL WDL        Skin Circulation/Temperature WDL    Skin Circulation/Temperature WDL WDL        Cardiac WDL    Cardiac WDL WDL        Peripheral/Neurovascular WDL    Peripheral Neurovascular WDL WDL        Cognitive/Neuro/Behavioral WDL    Cognitive/Neuro/Behavioral WDL WDL

## 2024-08-18 NOTE — PLAN OF CARE
[facility]    ED Boarding Nurse Handoff Addendum Report:    Date/time: 8/18/2024, 7:14 AM    Activity Level: assist of 1    Fall Risk: Yes:  activity supervised    Active Infusions: NA    Current Meds Due: See MAR    Current care needs: BG checks, monitor pain    Oxygen requirements (liters/min and/or FiO2): NA    Respiratory status: Room air    Vital signs (within last 30 minutes):    Vitals:    08/17/24 2100 08/17/24 2310 08/17/24 2315 08/18/24 0342   BP: (!) 154/70 126/44 127/47 137/54   BP Location:    Right arm   Pulse: 69  69 69   Resp:    18   Temp:    97.8  F (36.6  C)   TempSrc:    Oral   SpO2: 91%  95%    Weight:       Height:           Focused assessment within last 30 minutes:    AOx4, VSS, denies SOB, NPO, PRN Dilaudid given for pain, BG 86, sling placed on left arm, plan for ortho surgery to consult, PT and social work.      ED Boarding Nurse name: Miryam Albrecht RN

## 2024-08-18 NOTE — PROCEDURES
[facility]    Double Lumen Midline Placement    Date/Time: 8/18/2024 6:07 PM    Performed by: Lisseth Ku RN  Authorized by: Aguilar Martinez MD  Indications: vascular access      UNIVERSAL PROTOCOL   Site Marked: Yes  Prior Images Obtained and Reviewed:  Yes  Required items: Required blood products, implants, devices and special equipment available    Patient identity confirmed:  Verbally with patient, arm band and hospital-assigned identification number  NA - No sedation, light sedation, or local anesthesia  Confirmation Checklist:  Patient's identity using two indicators, relevant allergies, procedure was appropriate and matched the consent or emergent situation and correct equipment/implants were available  Time out: Immediately prior to the procedure a time out was called    Universal Protocol: the Joint Commission Universal Protocol was followed    Preparation: Patient was prepped and draped in usual sterile fashion       ANESTHESIA    Local Anesthetic:  Lidocaine 1% without epinephrine  Anesthetic Total (mL):  2      SEDATION    Patient Sedated: No        Skin prep agent: skin prep agent completely dried prior to procedure  Sterile barriers: maximum sterile barriers were used: cap, mask, sterile gown, sterile gloves, and large sterile sheet  Hand hygiene: hand hygiene performed prior to central venous catheter insertion  Type of line used: Midline  Catheter type: double lumen  Lumen Identification: Purple and Red  Catheter size: 5 Fr  Brand: Bard  Lot number: [id number]  Placement method: ultrasound, MST and venipuncture  Number of attempts: 1  Difficulty threading catheter: no  Successful placement: yes  Orientation: right    Location: basilic vein  Tip Location: distal to axillary vein  Arm circumference: adults 10 cm  Extremity circumference: 49  Visible catheter length: 5  Internal length: 15 cm  Total catheter length: 20  Dressing and securement: chlorhexidine patch  applied, site cleansed, statlock, transparent dressing, transparent securement dressing, sterile dressing applied and line secured  Post procedure assessment: blood return through all ports  PROCEDURE   Patient Tolerance:  Patient tolerated the procedure well with no immediate complicationsDescribe Procedure: Nursing note  Successfully placed double lumen ML catheter on the right basilic vein one attempt with good blood return noted. ML is ready for use.

## 2024-08-18 NOTE — PHARMACY-ADMISSION MEDICATION HISTORY
Pharmacy Intern Admission Medication History    Admission medication history is complete. The information provided in this note is only as accurate as the sources available at the time of the update.    Information Source(s): Patient and CareEverywhere/SureScripts via in-person    Pertinent Information:     For patients on insulin therapy:  Do you use sliding scale insulin based on blood sugars? No  Do you typically eat three meals a day? No (2 meals/day)  How many times do you check your blood glucose per day? Sensor  How many episodes of hypoglycemia do you typically have per month? None     Changes made to PTA medication list:  Added: None  Deleted: Excedrin, Mycolog II, nystatin  Changed:   Scheduled to PRN:  Derma smoothe  KENALOG  ATROVENT  Minoxidil 2.5 mg 1/2 tab->1/4 tab twice weekly  icosapent ethyl BID-> 2 caps at HS    Allergies reviewed with patient and updates made in EHR: yes    Medication History Completed By: Adalberto Lopez 8/18/2024 11:08 AM    PTA Med List   Medication Sig Last Dose    acetaminophen (TYLENOL) 500 MG tablet Take 1,000 mg by mouth every 8 hours as needed 8/17/2024 at am    amLODIPine (NORVASC) 2.5 MG tablet Take 1 tablet (2.5 mg) by mouth daily as needed For blood pressure > 140 Unknown at PRN    amoxicillin (AMOXIL) 500 MG capsule Takes 4 caps before every dental appointments. Unknown at PRN    aspirin (ASA) 81 MG EC tablet Take 81 mg by mouth daily (with lunch) 8/17/2024 at AM    BIOTIN PO Take 1 capsule by mouth at bedtime Unknown dose Past Week at PM    calcium citrate 250 MG TABS Take 1 tablet by mouth at bedtime Past Week at PM    carvedilol (COREG) 12.5 MG tablet Take 1 tablet (12.5 mg) by mouth 2 times daily (with meals) 8/17/2024 at AM    clotrimazole (LOTRIMIN) 1 % cream Apply topically as needed (rash/yeast infection) Unknown at PRN    Continuous Glucose Sensor (FREESTYLE BRANDY 3 SENSOR) MISC 1 each every 14 days Change every 14 days NEW ONEIS DUE TOMORROW    cyanocobalamin  (VITAMIN B-12) 1000 MCG tablet Take 1,000 mcg by mouth every evening Past Week at PM    empagliflozin (JARDIANCE) 10 MG TABS tablet Take 1 tablet (10 mg) by mouth every evening 8/17/2024 at PM    ezetimibe (ZETIA) 10 MG tablet Take 1 tablet (10 mg) by mouth every evening Past Week at PM    famotidine (PEPCID) 40 MG tablet Take 40 mg by mouth every evening Past Week at PM    fexofenadine (ALLEGRA) 180 MG tablet Take 180 mg by mouth daily (with lunch) Takes in the afternoon Past Week at NOON    fluocinolone acetonide (DERMA SMOOTHE/FS BODY) 0.01 % external oil Apply topically as needed Unknown at PRN    furosemide (LASIX) 20 MG tablet Take 1 tablet (20 mg) by mouth daily as needed (lower extremity edema) Unknown at PRN    icosapent ethyl (VASCEPA) 1 g CAPS capsule Take 2 g by mouth every evening Past Week at PM    insulin aspart (NOVOLOG PEN) 100 UNIT/ML pen Inject 10 Units subcutaneously every morning 8/17/2024 at AM    insulin aspart (NOVOLOG PEN) 100 UNIT/ML pen Inject 12 Units subcutaneously daily (before supper) Past Week at PM    INSULIN GLARGINE 100 UNIT/ML pen Inject 20 Units Subcutaneous daily 8/17/2024 at AM    ipratropium (ATROVENT) 0.03 % nasal spray Spray 1 spray in nostril every 8 hours as needed Unknown at PRN    ketoconazole (NIZORAL) 2 % external cream Apply topically 2 times daily as needed for itching Unknown at PRN    levothyroxine (SYNTHROID/LEVOTHROID) 112 MCG tablet Take 1 tablet (112 mcg) by mouth daily 8/17/2024 at AM    Lidocaine (LIDOCARE) 4 % Patch Place 1 patch onto the skin as needed To prevent lidocaine toxicity, patient should be patch free for 12 hrs daily. Unknown at PRN    methocarbamol (ROBAXIN) 500 MG tablet Take 1-2 tablets (500-1,000 mg) by mouth 4 times daily as needed for muscle spasms Unknown at PRN    minoxidil (LONITEN) 2.5 MG tablet Take 0.25 tablets by mouth twice a week On Monday and Thursday 8/15/2024    Multiple vitamin TABS Take 1 tablet by mouth daily 8/17/2024 at AM     nitroGLYcerin (NITROSTAT) 0.4 MG sublingual tablet Place 1 tablet (0.4 mg) under the tongue every 5 minutes as needed for chest pain (no more than 3 in one hour; after 3rd, call 911.) Unknown at PRN    nystatin (MYCOSTATIN) cream Apply topically daily as needed (groin) Unknown at PRN    olmesartan (BENICAR) 40 MG tablet Take 1 tablet (40 mg) by mouth daily 8/17/2024 at AM    ondansetron (ZOFRAN) 4 MG tablet Take 1 tablet (4 mg) by mouth every 6 hours as needed Reported on 3/20/2017 Unknown at PRN    pantoprazole (PROTONIX) 40 MG EC tablet Take 40 mg by mouth 2 times daily 8/17/2024 at AM    Polyethylene Glycol 400 (BLINK TEARS) 0.25 % GEL Place 1 drop into both eyes at bedtime Past Week at PM    pregabalin (LYRICA) 25 MG capsule Take 1 capsule (25 mg) by mouth daily as needed 8/17/2024    sucralfate (CARAFATE) 1 GM/10ML suspension Take 1 g by mouth 4 times daily as needed (GERD) Unknown at PRN    tretinoin (RETIN-A) 0.025 % external cream Apply topically nightly as needed (facial lesions) Use every night as tolerated - spot treat lesion 8/17/2024 at PRN    triamcinolone (KENALOG) 0.025 % external ointment Apply topically 2 times daily as needed for irritation Unknown at PRN    Vitamin D3 50 mcg (2000 units) tablet Take 1 tablet by mouth every evening Takes in the afternoon Past Week at PM

## 2024-08-18 NOTE — PROGRESS NOTES
[facility]    Medicine Progress Note - Hospitalist Service    Date of Admission:  8/17/2024    Primary Care Physician   Shola Benoit MD  CONSULTANTS: ortho    Assessment & Plan     Chasity Hodgson is a 78 year old female with a history of coronary artery disease, stage II chronic kidney disease, carotid artery stenosis, hypothyroidism, GERD, diabetes mellitus, depression, hyperlipidemia, ANNETTE, and PE who presents with left arm pain after twisting and putting on a blood pressure cuff      Left arm pain:   Patient presented with left arm pain after trying to put on a blood pressure cuff and twisting.  She has pain on the inner side of her elbow upper arm.  Imaging did not show any fracture nor DVT seen on ultrasound.  She does have some pain at the biceps tendon insertion site raising concern for a biceps tendon rupture.  At baseline she ambulates with a walker and currently this is not possible due to her arm injury.  Patiently to be seen by physical therapy to see if she is able to get around with a 1 arm walker as the patient lives at home alone.  Patient may require TCU.  Patient is to be seen by orthopedics.  May need to consider an MRI based on their evaluation.  Patient currently is NPO.  Patient's pain is currently being controlled with Tylenol, Atarax, Robaxin, oxycodone, and Dilaudid for breakthrough.  Patient's arm is in a sling. Getting IV pain medications, spoke with utilization management, making inpatient status    Type 2 insulin-dependent diabetes mellitus:   Hold prior to admission Jardiance, prandial insulin, and Lantus.  Still awaiting pharmacy to reconcile.  Patient currently is n.p.o. so we will only use a insulin correction scale at this time.        GERD:   Continue pantoprazole versus Pepcid.  Not sure what patient is taking.  Awaiting pharmacy to reconcile.    ANNETTE:   Continue nocturnal CPAP.    Hypertension:   Blood pressure is reasonable.  Has Norvasc ordered as  needed for blood pressures greater than 140.  She will continue on her home Coreg.  At this point holding her Lasix, Benicar.  Pharmacy still reconciling.      Coronary disease:   Denies any chest pain.  Did undergo an angiogram in November 2023 showing nonobstructive CAD which was unchanged from 2018.  TTE during that time showed preserved EF.  Consider an EKG if she goes for operative intervention.  Continue prior to admission carvedilol and aspirin. Hold home ARB.     Hyperlipidemia:   Holding her home Zetia and Tricor.  Pharmacy reconciling    Morbid obesity    Patient with a BMI of 40 which complicates all aspects of her cares and puts her at high risk for falls.    Hypothyroidism:   Continue levothyroxine.    Medications:  awaiting pharm D to reconcile medications.       Discussed plan of care with nursing in the emergency room, awaiting Ortho to see      Diet: NPO per Anesthesia Guidelines for Procedure/Surgery Except for: Meds    DVT Prophylaxis: Pneumatic Compression Devices, on aspirin  Bello Catheter: Not present  Lines: None     Cardiac Monitoring: None    RESTRAINTS: Not indicated  Code Status: Full Code         This document was created using voice recognition technology.  Please excuse any typographical errors that may have occurred.  Please call with any questions.             # Drug Induced Platelet Defect: home medication list includes an antiplatelet medication   # Hypertension: Noted on problem list    # Anemia: based on hgb <11      # DMII: A1C = 7.3 % (Ref range: 0.0 - 5.6 %) within past 6 months   # Severe Obesity: Estimated body mass index is 40.21 kg/m  as calculated from the following:    Height as of this encounter: 1.524 m (5').    Weight as of this encounter: 93.4 kg (205 lb 14.6 oz).       # Financial/Environmental Concerns:                 Disposition Plan      Expected Discharge Date: 08/19/2024                  Medically Ready for Discharge: Anticipated Tomorrow      Aguilar  MD Juan  Hospitalist Service  [facility]  Securely message with Elli (more info)  Text page via [employer] Paging/Directory   ______________________________________________________________________    Interval History   Patient is new to me.  Patient still having left arm pain from her elbow to her shoulders.  She has no fever, chills, sweats.  [facility]  She uses a 4 wheeled walker at baseline and lives alone.  Patient may require TCU at time of placement as she is not safe to return home.  Ortho still to see      ROS: A comprehensive review of systems was negative except for items noted in the HPI.  Patient currently denies any fever, chills, sweats, nausea, vomiting, diarrhea, shortness of breath, or chest pain.      Physical Exam   Vital Signs: Temp: 97.2  F (36.2  C) Temp src: Oral BP: (!) 152/54 Pulse: 74   Resp: 18 SpO2: 95 % O2 Device: None (Room air)    Weight: 205 lbs 14.55 oz      General appearance: Patient is alert and oriented x3, no apparent distress, pleasant and conversing normally, speaking in full sentences, appears stated age, sitting in bed    HEENT:  , Mucous membranes are moist  RESPIRATORY: Clear to auscultation bilateral, good air movement  CARDIOVASCULAR: Regular rate and rhythm, normal S1/S2, no murmurs  GASTROINTESTINAL: Obese, non-distended, non-tender, soft, bowel sounds present throughout,  NEUROLOGIC:  Cranial nerves II-XII intact, without any focal deficits, strength 5/5 throughout  EXTREMITIES:  Moves all extremities, no clubbing, cyanosis, nor edema, left arm tenderness with movement  :  Eugenia not present         Data     I have personally reviewed the following data over the past 24 hrs:    7.4  \   10.9 (L)   / 205     141 106 39.4 (H) /  82   4.5 21 (L) 1.52 (H) \       Imaging:   Results for orders placed or performed during the hospital encounter of 08/17/24   XR Shoulder Left G/E 3 Views    Narrative    EXAM: XR  SHOULDER LEFT G/E 3 VIEWS  LOCATION: [facility]  DATE: 8/17/2024    INDICATION: left shoulder pain  COMPARISON: 8/31/2023      Impression    IMPRESSION: Left shoulder arthroplasty. No hardware complication. No acute fracture or dislocation.   Humerus XR,  G/E 2 views, left    Narrative    EXAM: XR HUMERUS LEFT G/E 2 VIEWS  LOCATION: [facility]  DATE: 8/17/2024    INDICATION: Left humerus pain.  COMPARISON: None.      Impression    IMPRESSION: Left shoulder arthroplasty. No visible fracture or dislocation.   US Upper Extremity Venous Duplex Left    Narrative    EXAM: US UPPER EXTREMITY VENOUS DUPLEX LEFT  LOCATION: [facility]  DATE: 8/17/2024    INDICATION: Left arm pain, DVT eval  COMPARISON: None.  TECHNIQUE: Venous Duplex ultrasound of the left upper extremity with (when possible) and without compression, augmentation, and duplex. Color flow and spectral Doppler with waveform analysis performed.    FINDINGS: Ultrasound includes evaluation of the internal jugular vein, innominate vein, subclavian vein, axillary vein, and brachial vein. The superficial cephalic and basilic veins were also evaluated where seen.     LEFT: No deep venous thrombosis. No superficial thrombophlebitis.       Impression    IMPRESSION:   1.  No deep venous thrombosis in the left upper extremity.     *Note: Due to a large number of results and/or encounters for the requested time period, some results have not been displayed. A complete set of results can be found in Results Review.     Procedures: May require further imaging or procedures per Ortho, awaiting workup  I have personally have reviewed the patient's most up to date radiologic exams, labs, orders, and medications myself

## 2024-08-18 NOTE — H&P
[facility]  Hospitalist H&P    Name: Chasity Hodgson      MRN: [id number]  YOB: 1945    Age: 78 year old  Date of admission: 8/17/2024  Primary care provider: Shola Benoit            Assessment and Plan:     Chasity Hodgson is a 78 year old female with a history of coronary artery disease, stage II chronic kidney disease, carotid artery stenosis, hypothyroidism, GERD, diabetes mellitus, depression, hyperlipidemia, ANNETTE, and PE who presents with left arm pain.    Problem list:  Left arm pain: No fracture seen on basic imaging nor DVT seen on ultrasound.  She does have some pain at the biceps tendon insertion site raising concern for a biceps tendon rupture.  At baseline she ambulates with a walker and currently this is not possible due to her arm injury.  Will admit her to observation and request orthopedic evaluation.  Consider MRI for soft tissue evaluation.  She will be n.p.o. overnight in case surgical intervention is required.  Will request PT and SW consultations as well.  Diabetes mellitus: Hold prior to admission Jardiance and empagliflozin for now.  Start a medium sliding scale insulin.  Await medication reconciliation with regards to her insulin regimen.  GERD: Continue pantoprazole.  ANNETTE: Continue nocturnal CPAP.  Hypertension: Blood pressure is reasonable.  Continue prior to admission amlodipine, carvedilol, minoxidil, and olmesartan.  Coronary disease: Denies any chest pain.  Did undergo an angiogram in November 2023 showing nonobstructive CAD which was unchanged from 2018.  TTE during that time showed preserved EF.  Consider an EKG if she goes for operative intervention.  Continue prior to admission carvedilol but hold aspirin.  Hyperlipidemia: Continue fenofibrate and Zetia.  Hypothyroidism: Continue levothyroxine.    Clinically Significant Risk Factors Present on Admission                  # Drug Induced Platelet Defect: home medication list includes an  antiplatelet medication       # Hypertension: Noted on problem list            # DMII: A1C = 7.3 % (Ref range: 0.0 - 5.6 %) within past 6 months   # Severe Obesity: Estimated body mass index is 40.21 kg/m  as calculated from the following:    Height as of this encounter: 1.524 m (5').    Weight as of this encounter: 93.4 kg (205 lb 14.6 oz).         # Financial/Environmental Concerns:            Code status: Full.  Admit to observation.   Prophylaxis: PCD's.  Disposition: Home versus TCU in 1 to 2 days.    80 MINUTES SPENT BY ME on the date of service doing chart review, history, exam, documentation & further activities per the note.          Chief Complaint:     Left arm pain.         History of Present Illness:   Chasity Hodgson is a 78 year old female who presents with left arm pain.  History was obtained from my discussion with the patient at the bedside.  I also discussed the case with the ED provider.  The electronic medical record was also reviewed.    The patient was at home and attempting to check the blood pressure on her left wrist.  When wrapping the Velcro around her wrist she twisted her arm and felt a sharp shooting pain radiating up the left arm from the elbow and into the shoulder.  At baseline she ambulates with a walker.  The pain continued and she had to support her arm by lifting it up to provide any sort of relief.  EMS was called and she received 10 mcg of fentanyl and route to the hospital.    Here in the ED her temperature is 97.8, heart rate 69, blood pressure 152/72, respirate 20 and oxygen saturation 95% on room air.  No labs have been performed.  X-ray of the left humerus and shoulder are negative.  Ultrasound is also negative for DVT.  The patient is being admitted for pain management and orthopedic evaluation due to concern for biceps tendon rupture.            Past Medical History:     Past Medical History:   Diagnosis Date    Abdominal adhesions 1984, 96,99    s/p lysis    Abdominal  adhesions     Acne rosacea     Allergic rhinitis     Allergic rhinitis, cause unspecified     Alopecia     Anemia     CAD (coronary artery disease)     Carotid stenosis     CKD (chronic kidney disease) stage 2, GFR 60-89 ml/min     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)     CPAP (continuous positive airway pressure) dependence     Depression     Diabetic gastroparesis (H)     Diet-controlled type 2 diabetes mellitus (H)     DM2 (diabetes mellitus, type 2) (H)     DVT (deep venous thrombosis) (H)     DVT of axillary vein, acute right (H)     Fibromyalgia     Gastro-oesophageal reflux disease     GERD (gastroesophageal reflux disease)     Hernia of unspecified site of abdominal cavity without mention of obstruction or gangrene     bilateral    Hernia, abdominal     History of blood transfusion     10/ 1980    History of thrombophlebitis     HTN (hypertension)     HTN (hypertension)     Hypertriglyceridemia     Hypertriglyceridemia     Hypokalemia     Hypothyroidism     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    Mumps     Obstructive sleep apnea     ANNETTE (obstructive sleep apnea)     ANNETTE on CPAP     Osteoarthritis of glenohumeral joint     Papillary carcinoma of thyroid (H)     s/p thyroidectomy - Ruegemer    Papillary carcinoma of thyroid (H)     PE (pulmonary embolism)     Poliomyelitis     poor circulation right leg    Poliomyelitis     Postsurgical hypothyroidism     s/p papillary thryoid cancer - Ruegemer    Pulmonary embolism (H)     Pulmonary embolus (H)     Pulmonary nodule     Rosacea     S/P carpal tunnel release     bilateral    S/P hardware removal 01/2014    still with lingering foot pain    S/P shoulder surgery     bilateral    Septic joint (H)     right knee    Septic joint of right knee joint (H)     Venous insufficiency     Venous insufficiency     Venous thrombosis 1999    right axillary vein             Past Surgical History:     Past Surgical History:   Procedure Laterality Date    AMPUTATE TOE(S)  03/15/2012     Procedure:AMPUTATE TOE(S); Surgeon:RUBEN MOSES; Location: OR    AMPUTATE TOE(S)      APPENDECTOMY  1972    APPENDECTOMY      ARTHRODESIS FOOT  03/15/2012    Procedure:ARTHRODESIS FOOT; RIGHT TRIPLE ARTHRODESIS, FIFTH TOE AMPUTATION, LATERAL LIGAMENT RECONSTRUCTION, TENDON TRANSFER AND RELEASE [MINI C-ARM, ARTHREX 4.5 AND 6.7 STAPLES, BIOCOMPOSITE TENODESIS SCREWS]; Surgeon:RUBEN MOSES; Location: OR    ARTHRODESIS FOOT Right     Right foot triple arthrodesis and removal of hardware    ARTHROSCOPY SHOULDER  06/25/2015    REVISION SUBACROMIAL DECOMPRESSION, EXCISION OF GANGLION CYST, DEBRIDEMENT AND EXCISION OF THE GLENOHUMERAL JOINT GANGLION CYST, CORACOID DECOMPRESSION, POSSIBLE SUBSCAPULARIS REPAIR AND OPEN SUBSCAPULARIS BICEP    ARTHROSCOPY SHOULDER ROTATOR CUFF REPAIR      BIOPSY  Thyroid 2002    BLADDER SURGERY      BOTOX INJECTION MEDICAL      BREAST SURGERY  Biopsy    CHOLECYSTECTOMY      CHOLECYSTECTOMY      COLONOSCOPY  2018    COLOSTOMY  02/07/2012    Procedure:COLOSTOMY; CREATION OF SIGMOID COLOSTOMY AND EXTENSIVE  LYSIS OF ADHESIONS; Surgeon:MONTSERRAT BENDER; Location: OR    COLOSTOMY      CV ANGIOGRAM RENAL BILATERAL Bilateral 11/17/2023    Procedure: Angiogram Renal Bilateral;  Surgeon: Ankit Bhatia MD;  Location:  [facility]    CV CORONARY ANGIOGRAM N/A 11/17/2023    Procedure: Coronary Angiogram;  Surgeon: Ankit Bhatia MD;  Location: [facility] CARDIAC CATH LAB    CYSTOSCOPY      CYSTOSCOPY, INJECT BOTOX N/A 09/18/2023    Procedure: CYSTOSCOPY, WITH BOTULINUM TOXIN INJECTION;  Surgeon: Mishel Zendejas MD;  Location: [facility] OR    CYSTOSCOPY, INJECT BOTOX N/A 2/20/2024    Procedure: CYSTOSCOPY, WITH BOTULINUM TOXIN INJECTION;  Surgeon: Mishel Zendejas MD;  Location: [facility] OR    EYE SURGERY      GENITOURINARY SURGERY  1999    GI SURGERY      weakened rectal sphincter with artificial stimulator    HERNIA REPAIR  1976     HYSTERECTOMY TOTAL ABDOMINAL      LAPAROTOMY, LYSIS ADHESIONS, COMBINED  02/07/2012    Procedure:COMBINED LAPAROTOMY, LYSIS ADHESIONS; Surgeon:MONTSERRAT BENDER; Location:[facility] OR    RELEASE CARPAL TUNNEL      RELEASE TENDON FOOT  03/15/2012    Procedure:RELEASE TENDON FOOT; Surgeon:SUKHDEEP METZ; Location:[facility] OR    REMOVE HARDWARE FOOT  12/13/2012    Procedure: REMOVE HARDWARE FOOT;  RIGHT FOOT REMOVAL OF HARDWARE;  Surgeon: Sukhdeep Metz MD;  Location: [facility] OR    SHOULDER SURGERY  11/12/2020    LEFT SHOULDER HEMIARHTROPLASTY, BICEP TENODESIS    SOFT TISSUE SURGERY  2018, 2020    TENDON RELEASE      foot    THYROIDECTOMY      ZZC FREEING BOWEL ADHESION,ENTEROLYSIS      1986, 1996, 1999    ZZC NONSPECIFIC PROCEDURE      throidectomy    ZZC TOTAL ABDOM HYSTERECTOMY  1980    + BSO             Social History:     Social History     Tobacco Use    Smoking status: Never     Passive exposure: Never    Smokeless tobacco: Never   Substance Use Topics    Alcohol use: Never             Family History:   The family history was fully reviewed and non-contributory in this case.         Allergies:     Allergies   Allergen Reactions    Ketorolac Tromethamine Difficulty breathing     Shortness of breath to tablets only per the patient    Nsaids Difficulty breathing     Increased creatinine    Celecoxib Itching and Rash    Morphine And Codeine Itching     With higher doses. Pt denies this allergy 11/16/2023    Codeine Itching    Conjugated Estrogens     Crestor [Rosuvastatin] Muscle Pain (Myalgia)    No Clinical Screening - See Comments Itching     Fragrance    Vioxx Other (See Comments)     Heart races    Sulfa Antibiotics Rash             Medications:     Prior to Admission medications    Medication Sig Last Dose Taking? Auth Provider Long Term End Date   acetaminophen (TYLENOL) 500 MG tablet Take 1,000 mg by mouth every 8 hours as needed   Reported, Patient     acetaminophen-caffeine (EXCEDRIN TENSION HEADACHE)  500-65 MG TABS Take 2 tablets by mouth every 6 hours as needed for mild pain   Tasha Hay MD     amLODIPine (NORVASC) 2.5 MG tablet Take 1 tablet (2.5 mg) by mouth daily as needed For blood pressure > 140   Shola Benoit MD Yes    amoxicillin (AMOXIL) 500 MG capsule Takes 4 caps before every dental appointments.   Reported, Patient     aspirin (ASA) 81 MG EC tablet Take 81 mg by mouth daily (with lunch)   Reported, Patient     BIOTIN PO Take 1 capsule by mouth at bedtime Unknown dose   Unknown, Entered By History     blood glucose (NO BRAND SPECIFIED) test strip Use to test blood sugar 1 times daily or as directed.   Yolanda Tapia PA-C     calcium citrate 250 MG TABS Take 2 tablets by mouth daily   Reported, Patient     carvedilol (COREG) 12.5 MG tablet Take 1 tablet (12.5 mg) by mouth 2 times daily (with meals)   Ankit Bhatia MD Yes    clotrimazole (LOTRIMIN) 1 % cream Apply topically as needed (rash/yeast infection)   Reported, Patient     Continuous Glucose Sensor (FREESTYLE BRANDY 3 SENSOR) MISC 1 each every 14 days Change every 14 days   Odette Weston MD Yes    Cyanocobalamin (B-12 PO) Take 1 tablet by mouth every evening Unknown dose. Takes in the afternoon   Unknown, Entered By History     empagliflozin (JARDIANCE) 10 MG TABS tablet Take 1 tablet (10 mg) by mouth every evening   Odette Weston MD     ezetimibe (ZETIA) 10 MG tablet Take 1 tablet (10 mg) by mouth every evening   Shola Benoit MD Yes    famotidine (PEPCID) 40 MG tablet Take 40 mg by mouth daily   Reported, Patient No    fenofibrate (TRICOR) 145 MG tablet Take 1 tablet (145 mg) by mouth daily   Odette Weston MD Yes    fexofenadine (ALLEGRA) 180 MG tablet Take 180 mg by mouth daily (with lunch) Takes in the afternoon   Ramirez Chahal, DPJASON     fluocinolone acetonide (DERMA SMOOTHE/FS BODY) 0.01 % external oil Apply 2 mL to scalp once per week. Massage into scalp. Can be left in overnight or washed out  after 4-6 hours.   Renetta Meyer MD     furosemide (LASIX) 20 MG tablet Take 1 tablet (20 mg) by mouth daily as needed (lower extremity edema)   Shola Benoit MD Yes    icosapent ethyl (VASCEPA) 1 g CAPS capsule Take 1 g by mouth 2 times daily (with meals)   Odette Weston MD Yes    insulin aspart (NOVOLOG FLEXPEN) 100 UNIT/ML pen Inject 10 Units Subcutaneous daily before breakfast AND 12 Units daily (before supper). Breakfast and supper, pt eats minimal at lunch   Odette Weston MD Yes    INSULIN GLARGINE 100 UNIT/ML pen Inject 20 Units Subcutaneous daily   Odette Weston MD Yes    insulin pen needle (B-D U/F) 31G X 5 MM miscellaneous Use 4 pen needles daily or as directed.   Odette Weston MD     ipratropium (ATROVENT) 0.03 % nasal spray Spray 1 spray in nostril every 8 hours   Reported, Patient     ketoconazole (NIZORAL) 2 % external cream Twice a day for severe flare   Shola Benoit MD     levothyroxine (SYNTHROID/LEVOTHROID) 112 MCG tablet Take 1 tablet (112 mcg) by mouth daily   Odette Weston MD Yes    Lidocaine (LIDOCARE) 4 % Patch Place 2-3 patches onto the skin every 24 hours To prevent lidocaine toxicity, patient should be patch free for 12 hrs daily.   Yolanda Tapia PA-C     methocarbamol (ROBAXIN) 500 MG tablet Take 1-2 tablets (500-1,000 mg) by mouth 4 times daily as needed for muscle spasms   Jorge Luis Diaz MD     minoxidil (LONITEN) 2.5 MG tablet Take half tablet (1.25 mg) by mouth once daily  Patient taking differently: Take 1.25 mg by mouth every morning Take half tablet (1.25 mg) by mouth once daily   Renetta Meyer MD Yes    MULTIPLE VITAMIN PO Take 1 tablet by mouth daily (with lunch)   Reported, Patient     nitroGLYcerin (NITROSTAT) 0.4 MG sublingual tablet Place 1 tablet (0.4 mg) under the tongue every 5 minutes as needed for chest pain (no more than 3 in one hour; after 3rd, call 911.)   Ankit Bhatia MD Yes    nystatin  (MYCOSTATIN) cream Apply topically daily as needed (groin)   Reported, Patient     nystatin-triamcinolone (MYCOLOG II) cream Apply topically daily as needed (genital itching)   Reported, Patient     olmesartan (BENICAR) 40 MG tablet Take 1 tablet (40 mg) by mouth daily   Shola Benoit MD Yes    ondansetron (ZOFRAN) 4 MG tablet Take 1 tablet (4 mg) by mouth every 6 hours as needed Reported on 3/20/2017   Maude Graham PA-C     order for DME Equipment being ordered: Compression stockings - Knee High; 20-30 mmHg compression - note would like adhesive band to keep the stocking from sliding down   Shola Benoit MD     pantoprazole (PROTONIX) 40 MG EC tablet Take 40 mg by mouth 2 times daily   Reported, Patient     Polyethylene Glycol 400 (BLINK TEARS) 0.25 % GEL Apply 1 drop to eye at bedtime   Unknown, Entered By History     polyethylene glycol 400 (BLINK TEARS) 0.25 % SOLN ophthalmic solution Place 1 drop into both eyes daily   Reported, Patient     pregabalin (LYRICA) 25 MG capsule Take 1 capsule (25 mg) by mouth daily as needed   Shola Benoit MD Yes    sucralfate (CARAFATE) 1 GM/10ML suspension Take 1 g by mouth 4 times daily as needed (GERD)   Reported, Patient     SYNTHROID 112 MCG tablet Take 1 tablet (112 mcg) by mouth daily   Odette Weston MD Yes    tretinoin (RETIN-A) 0.025 % external cream Apply topically nightly as needed (facial lesions) Use every night as tolerated - spot treat lesion   Yolanda Tapia PA-C     triamcinolone (KENALOG) 0.1 % external cream Apply topically 2 times daily   Shola Benoit MD     triamcinolone (KENALOG) 0.1 % external ointment Use at night time to back M-F, off on weekends   Renetta Meyer MD     triamcinolone (KENALOG) 0.1 % external ointment Apply topically every other day   Renetta Meyer MD     Vitamin D3 50 mcg (2000 units) tablet Take 1 tablet by mouth every evening Takes in the  "afternoon   Reported, Patient               Review of Systems:     A Comprehensive greater than 10 system review of systems was carried out.  Pertinent positives and negatives are noted above.  Otherwise negative for contributory information.           Physical Exam:   Blood pressure 127/47, pulse 69, temperature 97.8  F (36.6  C), temperature source Oral, resp. rate 20, height 1.524 m (5'), weight 93.4 kg (205 lb 14.6 oz), SpO2 95%, not currently breastfeeding.  Wt Readings from Last 1 Encounters:   08/17/24 93.4 kg (205 lb 14.6 oz)     Exam:  GENERAL: No apparent distress. Awake, alert, and fully oriented.  HEENT: Normocephalic, atraumatic. Extraocular movements intact.  CARDIOVASCULAR: Regular rate and rhythm without murmurs or rubs. No S3.  PULMONARY: Clear to auscultation bilaterally.  ABDOMINAL: Soft, non-tender, non-distended. Bowel sounds normoactive.   EXTREMITIES: No cyanosis or clubbing. No appreciable edema.  NEUROLOGICAL: CN 2-12 grossly intact, no focal neurological deficits.  DERMATOLOGICAL: No rash, ulcer, bruising, nor jaundice.          Data:   Laboratory:  None.    No results for input(s): \"CULT\" in the last 168 hours.    Imaging:  Recent Results (from the past 24 hour(s))   Humerus XR,  G/E 2 views, left    Narrative    EXAM: XR HUMERUS LEFT G/E 2 VIEWS  LOCATION: [facility]  DATE: 8/17/2024    INDICATION: Left humerus pain.  COMPARISON: None.      Impression    IMPRESSION: Left shoulder arthroplasty. No visible fracture or dislocation.   XR Shoulder Left G/E 3 Views    Narrative    EXAM: XR SHOULDER LEFT G/E 3 VIEWS  LOCATION: [facility]  DATE: 8/17/2024    INDICATION: left shoulder pain  COMPARISON: 8/31/2023      Impression    IMPRESSION: Left shoulder arthroplasty. No hardware complication. No acute fracture or dislocation.   US Upper Extremity Venous Duplex Left    Narrative    EXAM: US UPPER EXTREMITY VENOUS DUPLEX LEFT  LOCATION: [facility]  DATE: 8/17/2024    INDICATION: Left arm pain, DVT eval  COMPARISON: None.  TECHNIQUE: Venous Duplex ultrasound of the left upper extremity with (when possible) and without compression, augmentation, and duplex. Color flow and spectral Doppler with waveform analysis performed.    FINDINGS: Ultrasound includes evaluation of the internal jugular vein, innominate vein, subclavian vein, axillary vein, and brachial vein. The superficial cephalic and basilic veins were also evaluated where seen.     LEFT: No deep venous thrombosis. No superficial thrombophlebitis.       Impression    IMPRESSION:   1.  No deep venous thrombosis in the left upper extremity.       Alfredo Winters DO MPH  [facility] Hospitalist  [address] [place] [address]  08/17/2024

## 2024-08-18 NOTE — CONSULTS
This patient has been seen and evaluated by me, SAPNA ELLIOTT MD. Discussed with the house staff team or resident(s) and agree with the findings and plan in this note.

## 2024-08-18 NOTE — ED NOTES
[facility]  ED Nurse Handoff Report    ED Chief complaint: Arm Pain  . ED Diagnosis:   Final diagnoses:   Pain of left upper arm   Acute pain of left shoulder       Allergies:   Allergies   Allergen Reactions    Ketorolac Tromethamine Difficulty breathing     Shortness of breath to tablets only per the patient    Nsaids Difficulty breathing     Increased creatinine    Celecoxib Itching and Rash    Morphine And Codeine Itching     With higher doses. Pt denies this allergy 11/16/2023    Codeine Itching    Conjugated Estrogens     Crestor [Rosuvastatin] Muscle Pain (Myalgia)    No Clinical Screening - See Comments Itching     Fragrance    Vioxx Other (See Comments)     Heart races    Sulfa Antibiotics Rash       Code Status: Full Code    Activity level - Baseline/Home:  walker.  Activity Level - Current:   assist of 2.   Lift room needed: No.   Bariatric: No   Needed: No   Isolation: Yes.   Infection: Not Applicable  MRSA.     Respiratory status: Room air    Vital Signs (within 30 minutes):   Vitals:    08/17/24 2310 08/17/24 2315 08/18/24 0342 08/18/24 0700   BP: 126/44 127/47 137/54 (!) 152/54   BP Location:   Right arm Right arm   Pulse:  69 69 74   Resp:   18 18   Temp:   97.8  F (36.6  C) 97.2  F (36.2  C)   TempSrc:   Oral Oral   SpO2:  95%  95%   Weight:       Height:           Cardiac Rhythm:  ,      Pain level:    Patient confused: No.   Patient Falls Risk: patient and family education.   Elimination Status:      Patient Report - Initial Complaint: Arm Pain.   Focused Assessment: Chasity Hodgson is a 78 year old female with a history of CAD, CKD stage 2, type 2 diabetes mellitus, DVT, hyperlipidemia, and pulmonary embolism who presents to the ED via EMS for evaluation of arm pain. The patient reports that she was wrapping Velcro to check her blood pressure on her left wrist, when she twisted her arm and felt sharp shooting pain in her left arm from her elbow to her shoulder. She  needed to support her arm to have any relief and has been unable to move that arm. EMS administered 10 mcg of fentanyl, which provided only minimal relief. EMS applied a shoulder sling. The patient reports that she has pain in left shoulder, upper arm, and now starting to radiate down to her hand. She doesn't recall hearing a pop or snap when her pain started. She denies having any numbness. Her sensation is intact. The patient denies having a history of cancer or bone lesions. She has never dislocated her shoulder. She notes that she had a left shoulder replacement in 2022.      Abnormal Results:   Labs Ordered and Resulted from Time of ED Arrival to Time of ED Departure   BASIC METABOLIC PANEL - Abnormal       Result Value    Sodium 141      Potassium 4.5      Chloride 106      Carbon Dioxide (CO2) 21 (*)     Anion Gap 14      Urea Nitrogen 39.4 (*)     Creatinine 1.52 (*)     GFR Estimate 35 (*)     Calcium 8.8      Glucose 108 (*)    CBC WITH PLATELETS - Abnormal    WBC Count 7.4      RBC Count 3.58 (*)     Hemoglobin 10.9 (*)     Hematocrit 33.4 (*)     MCV 93      MCH 30.4      MCHC 32.6      RDW 12.2      Platelet Count 205     GLUCOSE BY METER - Normal    GLUCOSE BY METER POCT 80     GLUCOSE BY METER - Normal    GLUCOSE BY METER POCT 86     GLUCOSE BY METER - Normal    GLUCOSE BY METER POCT 82     GLUCOSE MONITOR NURSING POCT   GLUCOSE MONITOR NURSING POCT        US Upper Extremity Venous Duplex Left   Final Result   IMPRESSION:    1.  No deep venous thrombosis in the left upper extremity.      XR Shoulder Left G/E 3 Views   Final Result   IMPRESSION: Left shoulder arthroplasty. No hardware complication. No acute fracture or dislocation.      Humerus XR,  G/E 2 views, left   Final Result   IMPRESSION: Left shoulder arthroplasty. No visible fracture or dislocation.          Treatments provided: Medcation   Family Comments:   OBS brochure/video discussed/provided to patient:    ED Medications:   Medications    acetaminophen (TYLENOL) tablet 650 mg (has no administration in time range)     Or   acetaminophen (TYLENOL) Suppository 650 mg (has no administration in time range)   oxyCODONE IR (ROXICODONE) half-tab 2.5 mg (has no administration in time range)   oxyCODONE (ROXICODONE) tablet 5 mg (has no administration in time range)   HYDROmorphone (DILAUDID) injection 0.2 mg (0.2 mg Intravenous $Given 8/18/24 0743)   HYDROmorphone (DILAUDID) injection 0.4 mg (has no administration in time range)   melatonin tablet 3 mg (has no administration in time range)   senna-docusate (SENOKOT-S/PERICOLACE) 8.6-50 MG per tablet 1 tablet (has no administration in time range)     Or   senna-docusate (SENOKOT-S/PERICOLACE) 8.6-50 MG per tablet 2 tablet (has no administration in time range)   polyethylene glycol (MIRALAX) Packet 17 g (has no administration in time range)   ondansetron (ZOFRAN ODT) ODT tab 4 mg (has no administration in time range)     Or   ondansetron (ZOFRAN) injection 4 mg (has no administration in time range)   glucose gel 15-30 g (has no administration in time range)     Or   dextrose 50 % injection 25-50 mL (has no administration in time range)     Or   glucagon injection 1 mg (has no administration in time range)   insulin aspart (NovoLOG) injection (RAPID ACTING) ( Subcutaneous Not Given 8/18/24 0411)   hydrOXYzine HCl (ATARAX) tablet 25 mg (has no administration in time range)     Or   hydrOXYzine HCl (ATARAX) tablet 50 mg (has no administration in time range)   Lidocaine (LIDOCARE) 4 % Patch 2 patch (2 patches Transdermal $Patch/Med Applied 8/18/24 0743)   oxyCODONE (ROXICODONE) tablet 5 mg (5 mg Oral $Given 8/17/24 2058)   morphine (PF) injection 4 mg (4 mg Intravenous $Given 8/17/24 2234)   ondansetron (ZOFRAN) injection 4 mg (4 mg Intravenous $Given 8/17/24 2235)       Drips infusing:  No  For the majority of the shift this patient was Green.   Interventions performed were .    Sepsis treatment initiated:  No    Cares/treatment/interventions/medications to be completed following ED care:     ED Nurse Name: Tere Stallings RN  7:54 AM     [facility]    ED Boarding Nurse Handoff Addendum Report:    Date/time: 8/18/2024, 10:38 AM    Activity Level: in bed    Fall Risk: Yes:  bed/chair alarm on, nonskid shoes/slippers when out of bed, patient and family education, activity supervised, and room door open    Active Infusions: Saline locked    Current Meds Due: pending pharmacy verification    Current care needs: pain management.    Oxygen requirements (liters/min and/or FiO2): 2L NC    Respiratory status: 2L NC 96%    Vital signs (within last 30 minutes):    Vitals:    08/17/24 2310 08/17/24 2315 08/18/24 0342 08/18/24 0700   BP: 126/44 127/47 137/54 (!) 152/54   BP Location:   Right arm Right arm   Pulse:  69 69 74   Resp:   18 18   Temp:   97.8  F (36.6  C) 97.2  F (36.2  C)   TempSrc:   Oral Oral   SpO2:  95%  95%   Weight:       Height:           Focused assessment within last 30 minutes:    Patient alert and oriented. VSS C/O Left Arm pain Dilaudid PRN. Left arm in sling. Lidocaine patches on. Purewick in place. Colostomy bag left lower quadrant. PICC stat called to assess IV access PIV currently above armpit in upper right chest. . Patient is NPO.     ED Boarding Nurse name: Nadja Coates RN     RECEIVING UNIT ED HANDOFF REVIEW    Above ED Nurse Handoff Report was reviewed: Yes  Reviewed by: Mahin Lopez RN on August 18, 2024 at 11:35 AM   I Elli called the ED to inform them the note was read: calling at 1140

## 2024-08-18 NOTE — ED NOTES
Bed: ED18  Expected date: 8/17/24  Expected time: 7:37 PM  Means of arrival:   Comments:  [facility]

## 2024-08-18 NOTE — ED PROVIDER NOTES
Emergency Department Note      History of Present Illness     Chief Complaint   Arm Pain    HPI   Chasity Hodgson is a 78 year old female with a history of CAD, CKD stage 2, type 2 diabetes mellitus, DVT, hyperlipidemia, and pulmonary embolism who presents to the ED via EMS for evaluation of arm pain. The patient reports that she was wrapping Velcro to check her blood pressure on her left wrist, when she twisted her arm and felt sharp shooting pain in her left arm from her elbow to her shoulder. She needed to support her arm to have any relief and has been unable to move that arm. EMS administered 10 mcg of fentanyl, which provided only minimal relief. EMS applied a shoulder sling. The patient reports that she has pain in left shoulder, upper arm, and now starting to radiate down to her hand. She doesn't recall hearing a pop or snap when her pain started. She denies having any numbness. Her sensation is intact. The patient denies having a history of cancer or bone lesions. She has never dislocated her shoulder. She notes that she had a left shoulder replacement in 2022.     Independent Historian   None    Review of External Notes   8/16/2024 office visit note    Past Medical History     Medical History and Problem List   Hypertriglyceridemia   Papillary carcinoma of thyroid   PE 2  Venous insufficiency  Postsurgical hypothyroidism   Hypertension   Fibromyalgia   Type 2 diabetes   ANNETTE   CPAP  DVT x2  Carotid stenosis  CAD  CKD stage 2   Hypothyroidism   GERD  Anemia   Pulmonary nodule   Hypokalemia   Alopecia   Papillary carcinoma of thyroid   Septic joint of right knee joint   Depression   Poliomyelitis   Venous insufficiency  Abdominal hernia   Mumps     Medications   Amlodipine   Carvediol   Jardiance   Aspirin   Carvedilol   Cyanocobalamin   Empagliflozin   Ezetimibe   Fenofibrate   Furosemide   Icosapent ethyl  Insulin  Levothyroxine  Minodyl  Nitroglycerine  Olmesartan   Pantoprazole   Sucralfate     Surgical  History   Freeing bowel adhesion, enterolysis   GI surgery   Colostomy   Laparotomy  Arthrodesis foot  Release tendon foot   Amputate toe   Appendectomy  Remove hardware foot  Cholecystectomy   Total abdomen hysterectomy   Arthroscopy shoulder rotator cuff repair   Release carpal tunnel   Cholecystectomy   Appendectomy   Hysterectomy total abdominal   Amputate toes   Tendon release  Colostomy   Thyroidectomy   Arthrodesis foot  Arthroscopy shoulder   Shoulder surgery   Biopsy  Breast surgery  Colonoscopy   Eye surgery   Hernia repair   Soft tissue surgery  Bladder surgery  Cystoscopy   Botox injection medical   Coronary angiogram  Angiogram renal bilateral      Physical Exam     Patient Vitals for the past 24 hrs:   BP Temp Temp src Pulse Resp SpO2 Height Weight   08/17/24 2100 (!) 154/70 -- -- 69 -- 91 % -- --   08/17/24 2045 138/73 -- -- 70 -- 91 % -- --   08/17/24 2030 (!) 142/65 -- -- 72 -- 90 % -- --   08/17/24 2015 (!) 149/56 -- -- 68 -- 93 % -- --   08/17/24 2006 -- -- -- -- -- -- 1.524 m (5') 93.4 kg (205 lb 14.6 oz)   08/17/24 1958 (!) 152/72 97.8  F (36.6  C) Oral 69 20 95 % -- --     Physical Exam  Constitutional:       Appearance: Normal appearance.      General: Not in acute distress.  HENT:      Head: Normocephalic and atraumatic.   Eyes:      Extraocular Movements: Extraocular movements intact.      Conjunctiva/sclera: Conjunctivae normal.   Cardiovascular:      Rate and Rhythm: Normal rate and regular rhythm.   Pulmonary:      Effort: Pulmonary effort is normal. No respiratory distress.   Abdominal:      General: Abdomen is flat. There is no distension.   Musculoskeletal:         General: No swelling or deformity.      Cervical back: Normal range of motion. No rigidity.      Left arm: Tenderness to palpation to the left shoulder and left midshaft humerus.  Limited range of motion of left shoulder and left elbow due to tenderness to the left shoulder and left humerus.  No tenderness to palpation of  "the left forearm or left wrist.     Left hand: Able to complete okay sign, thumbs up, hold fist, show \"number 2,\" finger cross index and middle finger, spread digits against resistance, ulnar/radial deviate wrist, and flex/extend wrist. No anatomical snuff box tenderness to palpation. No abrasions, lacerations, or bruising. 2+ radial artery pulse. Cap refill <2 seconds. Sensation intact in all aspects of hand.  Skin:     Coloration: Skin is not jaundiced or pale.   Neurological:      General: No focal deficit present.      Mental Status: Alert and oriented to person, place, and time.   Psychiatric:         Mood and Affect: Mood normal.         Behavior: Behavior normal.    Diagnostics     Lab Results   Labs Ordered and Resulted from Time of ED Arrival to Time of ED Departure - No data to display    Imaging   XR Shoulder Left G/E 3 Views   Final Result   IMPRESSION: Left shoulder arthroplasty. No hardware complication. No acute fracture or dislocation.      Humerus XR,  G/E 2 views, left   Final Result   IMPRESSION: Left shoulder arthroplasty. No visible fracture or dislocation.      US Upper Extremity Venous Duplex Left    (Results Pending)     Independent Interpretation   None    ED Course      Medications Administered   Medications   oxyCODONE (ROXICODONE) tablet 5 mg (5 mg Oral $Given 8/17/24 2058)   morphine (PF) injection 4 mg (4 mg Intravenous $Given 8/17/24 2234)   ondansetron (ZOFRAN) injection 4 mg (4 mg Intravenous $Given 8/17/24 2235)     Procedures   Procedures     Discussion of Management   See ED course    ED Course   ED Course as of 08/18/24 0000   Sat Aug 17, 2024   2035 I obtained history and examined the patient as noted above.    2230 Patient updated on x-ray findings.  Still complaining of significant left arm pain.  Suspect possibility of biceps injury.   2314 US Upper Extremity Venous Duplex Left  No DVT   2318 On reevaluation, patient updated on lab and image finding.  Still having difficulty " with range of motion of the left arm.  Patient unable to flex at elbow.  Tenderness to palpation in the distal biceps.  Findings suspicious for biceps tendon rupture.  Patient reports that she is purely walker dependent.  As a result, will consult hospitalist for admission for PT/OT evaluation and orthopedic consultation inpatient.  Discussed care plan with patient who voiced understanding and agreement with plan.  Answered all questions.   2630 Discussed patient with hospitalist Alfredo Winters DO who accept the patient for admission.     Additional Documentation  None    Medical Decision Making / Diagnosis     MDM   78-year-old female as described above presents to the emergency department for left arm pain which she reports was sudden in onset while she was using her left arm to secure a right sided wrist Velcro blood pressure cuff.  Patient reports the pain was sudden in onset during that movement and since then she has been having difficulty with lifting her left arm with radiation pain from left shoulder to left humerus down into her left wrist.  Patient complains of primary tenderness in the left mid humeral shaft in addition to the left shoulder.  Replace history of left shoulder replacement in the past.  Will obtain plain film imaging of the left shoulder and left humerus for evaluation for acute fracture and dislocation.  Pain control.  Patient currently in left elbow sling.  Otherwise normal neurovasculature in the left distal arm with normal range of motion of the left hand and wrist in addition to good pulsation to the left radius.  No associated paresthesia.  Discussed care plan with patient who voiced understanding and agreement with plan.  Answered all questions.  Additional workup and orders as listed in chart.     Ultimately, work up shows no evidence of acute fracture or dislocation and no evidence of DVT.  Given inability to flex at left elbow and tenderness to palpation at the biceps, concern for  biceps tendon rupture.     Patient reports that she is walker dependent for ambulation, as a result, will admit patient to hospital for further pain control, PT/OT evaluation, and orthopedic consultation.     Please refer to ED course above as part of continuation of MDM for details on the patient's treatment course and any potential changes or updates beyond my initial evaluation and MDM creation.    Disposition   The patient was admitted to the hospital.     Diagnosis     ICD-10-CM    1. Pain of left upper arm  M79.622       2. Acute pain of left shoulder  M25.512         Discharge Medications   New Prescriptions    No medications on file     Scribe Disclosure:  I, Wang Andrews, am serving as a scribe at 10:45 PM on 8/17/2024 to document services personally performed by Curtis Cross DO, based on my observations and the provider's statements to me.        Curtis Cross DO  08/18/24 0005

## 2024-08-18 NOTE — PLAN OF CARE
Goal Outcome Evaluation:      Plan of Care Reviewed With: patient    Overall Patient Progress: no changeOverall Patient Progress: no change    Outcome Evaluation: Pt in severe L arm pain - pt arrived to unit with PIV placed near L shoulder, per report it was in propper functioning depsite unusual placement, but upon utilizing line to admin IV dilaudid, line leaked with initial flush. Dilaudid not given and returned in pyxus with other RN. Pending consult from PICC STAT as there is no vascular access in house. Pt currently without functioning IV access. Pt is also NPO in current order set - pt stated they were seen by the orthopedic surgeron who stated she can drink and she won't have sugery today, but this remark is not reflected in ortho note or contempoary orders. I delegated a call out to the ortho providers of this pt, no response back by shift change. Pt had oral meds in ER, to manage pt's extreme pain I administered robaxin, tylenol. Oncoming aware of next oxycodone available. Unable to complete MRI checklist prior to shift change, delegated to oncoming. Admission incomplete.     Problem: Adult Inpatient Plan of Care  Goal: Plan of Care Review  Description: The Plan of Care Review/Shift note should be completed every shift.  The Outcome Evaluation is a brief statement about your assessment that the patient is improving, declining, or no change.  This information will be displayed automatically on your shift  note.  Outcome: Not Progressing  Flowsheets (Taken 8/18/2024 1602)  Outcome Evaluation: Pt in severe L arm pain - pt arrived to unit with PIV placed near L shoulder, per report it was in propper functioning depsite unusual placement, but upon utilizing line to admin IV dilaudid, line leaked with initial flush. Dilaudid not given and returned in pyxus with other RN. Pending consult from PICC STAT as there is no vascular access in house. Pt currently without functioning IV access. Pt is also NPO in current  "order set - pt stated they were seen by the orthopedic surgeron who stated she can drink and she won't have sugery today, but this remark is not reflected in ortho note or contempoary orders. I delegated a call out to the ortho providers of this pt, no response back by shift change. Pt had oral meds in ER, to manage pt's extreme pain I administered robaxin, tylenol. Oncoming aware of next oxycodone available.  Plan of Care Reviewed With: patient  Overall Patient Progress: no change  Goal: Patient-Specific Goal (Individualized)  Description: You can add care plan individualizations to a care plan. Examples of Individualization might be:  \"Parent requests to be called daily at 9am for status\", \"I have a hard time hearing out of my right ear\", or \"Do not touch me to wake me up as it startles  me\".  Outcome: Not Progressing  Goal: Optimal Comfort and Wellbeing  Outcome: Not Progressing  Goal: Readiness for Transition of Care  Outcome: Not Progressing     Problem: Diabetes  Goal: Optimal Functional Ability  Outcome: Not Progressing     Problem: Comorbidity Management  Goal: Blood Glucose Levels Within Targeted Range  Outcome: Not Progressing  Goal: Blood Pressure in Desired Range  Outcome: Not Progressing     Problem: Adult Inpatient Plan of Care  Goal: Absence of Hospital-Acquired Illness or Injury  Outcome: Progressing     Problem: Pain Acute  Goal: Optimal Pain Control and Function  Outcome: Progressing     Problem: Diabetes  Goal: Optimal Coping  Outcome: Progressing  Goal: Blood Glucose Level Within Target Range  Outcome: Progressing  Goal: Minimize Risk of Hypoglycemia  Outcome: Progressing     "

## 2024-08-18 NOTE — CONSULTS
[facility]    Orthopedic Consultation    Chasity Hodgson MRN# [id number]   Age: 78 year old YOB: 1945     Date of Admission:  8/17/2024    Reason for consult: Left arm pain       Requesting physician: Admitting physician       Level of consult: Consult, follow and place orders           Assessment and Plan:   Assessment:   Left sided arm pain  Rule out distal biceps tendon rupture        Plan:   Sling left arm at all times. Will re-examine on 8/19/24. Will order MRI of the left elbow to rule out distal biceps tendon tear.            Chief Complaint:   Left arm pain         History of Present Illness:   This patient is a 78 year old female who presents with the following condition requiring a hospital admission:    She reached across her body and was putting on a right sided blood pressure cuff and felt pain in the left arm that was acute in nature. She presented to the ED to help with pain.           Past Medical History:     Past Medical History:   Diagnosis Date    Abdominal adhesions 1984, 96,99    s/p lysis    Abdominal adhesions     Acne rosacea     Allergic rhinitis     Allergic rhinitis, cause unspecified     Alopecia     Anemia     CAD (coronary artery disease)     Carotid stenosis     CKD (chronic kidney disease) stage 2, GFR 60-89 ml/min     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)     CPAP (continuous positive airway pressure) dependence     Depression     Diabetic gastroparesis (H)     Diet-controlled type 2 diabetes mellitus (H)     DM2 (diabetes mellitus, type 2) (H)     DVT (deep venous thrombosis) (H)     DVT of axillary vein, acute right (H)     Fibromyalgia     Gastro-oesophageal reflux disease     GERD (gastroesophageal reflux disease)     Hernia of unspecified site of abdominal cavity without mention of obstruction or gangrene     bilateral    Hernia, abdominal     History of blood transfusion     10/ 1980    History of thrombophlebitis     HTN (hypertension)     HTN  (hypertension)     Hypertriglyceridemia     Hypertriglyceridemia     Hypokalemia     Hypothyroidism     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    Mumps     Obstructive sleep apnea     ANNETTE (obstructive sleep apnea)     ANNETTE on CPAP     Osteoarthritis of glenohumeral joint     Papillary carcinoma of thyroid (H)     s/p thyroidectomy - Ruegemer    Papillary carcinoma of thyroid (H)     PE (pulmonary embolism)     Poliomyelitis     poor circulation right leg    Poliomyelitis     Postsurgical hypothyroidism     s/p papillary thryoid cancer - Ruegemer    Pulmonary embolism (H)     Pulmonary embolus (H)     Pulmonary nodule     Rosacea     S/P carpal tunnel release     bilateral    S/P hardware removal 01/2014    still with lingering foot pain    S/P shoulder surgery     bilateral    Septic joint (H)     right knee    Septic joint of right knee joint (H)     Venous insufficiency     Venous insufficiency     Venous thrombosis 1999    right axillary vein             Past Surgical History:     Past Surgical History:   Procedure Laterality Date    AMPUTATE TOE(S)  03/15/2012    Procedure:AMPUTATE TOE(S); Surgeon:RUBEN MOSES; Location: OR    AMPUTATE TOE(S)      APPENDECTOMY  1972    APPENDECTOMY      ARTHRODESIS FOOT  03/15/2012    Procedure:ARTHRODESIS FOOT; RIGHT TRIPLE ARTHRODESIS, FIFTH TOE AMPUTATION, LATERAL LIGAMENT RECONSTRUCTION, TENDON TRANSFER AND RELEASE [MINI C-ARM, ARTHREX 4.5 AND 6.7 STAPLES, BIOCOMPOSITE TENODESIS SCREWS]; Surgeon:RUBEN MOSES; Location: OR    ARTHRODESIS FOOT Right     Right foot triple arthrodesis and removal of hardware    ARTHROSCOPY SHOULDER  06/25/2015    REVISION SUBACROMIAL DECOMPRESSION, EXCISION OF GANGLION CYST, DEBRIDEMENT AND EXCISION OF THE GLENOHUMERAL JOINT GANGLION CYST, CORACOID DECOMPRESSION, POSSIBLE SUBSCAPULARIS REPAIR AND OPEN SUBSCAPULARIS BICEP    ARTHROSCOPY SHOULDER ROTATOR CUFF REPAIR      BIOPSY  Thyroid 2002    BLADDER SURGERY      BOTOX  INJECTION MEDICAL      BREAST SURGERY  Biopsy    CHOLECYSTECTOMY      CHOLECYSTECTOMY      COLONOSCOPY  2018    COLOSTOMY  02/07/2012    Procedure:COLOSTOMY; CREATION OF SIGMOID COLOSTOMY AND EXTENSIVE  LYSIS OF ADHESIONS; Surgeon:MONTSERRAT BENDER; Location: OR    COLOSTOMY      CV ANGIOGRAM RENAL BILATERAL Bilateral 11/17/2023    Procedure: Angiogram Renal Bilateral;  Surgeon: Ankit Bhatia MD;  Location:  [facility]    CV CORONARY ANGIOGRAM N/A 11/17/2023    Procedure: Coronary Angiogram;  Surgeon: Ankit Bhatia MD;  Location:  [facility] LAB    CYSTOSCOPY      CYSTOSCOPY, INJECT BOTOX N/A 09/18/2023    Procedure: CYSTOSCOPY, WITH BOTULINUM TOXIN INJECTION;  Surgeon: Mishel Zendejas MD;  Location: [facility] OR    CYSTOSCOPY, INJECT BOTOX N/A 2/20/2024    Procedure: CYSTOSCOPY, WITH BOTULINUM TOXIN INJECTION;  Surgeon: Mishel Zendejas MD;  Location: [facility] OR    EYE SURGERY      GENITOURINARY SURGERY  1999    GI SURGERY      weakened rectal sphincter with artificial stimulator    HERNIA REPAIR  1976    HYSTERECTOMY TOTAL ABDOMINAL      LAPAROTOMY, LYSIS ADHESIONS, COMBINED  02/07/2012    Procedure:COMBINED LAPAROTOMY, LYSIS ADHESIONS; Surgeon:MONTSERRAT BENDER; Location: OR    RELEASE CARPAL TUNNEL      RELEASE TENDON FOOT  03/15/2012    Procedure:RELEASE TENDON FOOT; Surgeon:RUBEN MOSES; Location: OR    REMOVE HARDWARE FOOT  12/13/2012    Procedure: REMOVE HARDWARE FOOT;  RIGHT FOOT REMOVAL OF HARDWARE;  Surgeon: Ruben Moses MD;  Location:  OR    SHOULDER SURGERY  11/12/2020    LEFT SHOULDER HEMIARHTROPLASTY, BICEP TENODESIS    SOFT TISSUE SURGERY  2018, 2020    TENDON RELEASE      foot    THYROIDECTOMY      [facility] FREEING BOWEL ADHESION,ENTEROLYSIS      1986, 1996, 1999    Z NONSPECIFIC PROCEDURE      throidectomy    Z TOTAL ABDOM HYSTERECTOMY  1980    + BSO             Social History:     Social History     Tobacco Use     Smoking status: Never     Passive exposure: Never    Smokeless tobacco: Never   Substance Use Topics    Alcohol use: Never             Family History:     Family History   Problem Relation Age of Onset    Arthritis Mother     Hypertension Mother     Cerebrovascular Disease Mother     Obesity Mother     Heart Disease Mother         MI's    Hypertension Father     Respiratory Father         Adult RDS    Diabetes Father     Skin Cancer Sister     Arthritis Sister     Cancer Sister     Diabetes Sister     Hypertension Sister     Breast Cancer Sister     Depression Sister     Thyroid Disease Sister     Obesity Sister     Arthritis Sister     Hypertension Sister     Thyroid Disease Sister     Osteoporosis Sister     Obesity Sister     Cancer Sister         colon polup    Hypertension Sister     Osteoporosis Sister     Obesity Sister     Colon Cancer Sister     Lipids Sister     Obesity Sister     Heart Disease Brother         MI at 54    Other Cancer Brother         Lung & bone    Lipids Brother     Hypertension Brother     Diabetes Brother     Hyperlipidemia Brother     Obesity Maternal Grandmother         Dad s mother    Skin Cancer Maternal Grandmother         skin cancer unknown    Cancer Maternal Grandmother         unknown skin cancer on face    Obesity Paternal Grandmother         Mothers mother    Ovarian Cancer No family hx of     Anesthesia Reaction No family hx of     Deep Vein Thrombosis (DVT) No family hx of              Immunizations:     VACCINE/DOSE   Diptheria   DPT   DTAP   HBIG   Hepatitis A   Hepatitis B   HIB   Influenza   Measles   Meningococcal   MMR   Mumps   Pneumococcal   Polio   Rubella   Small Pox   TDAP   Varicella   Zoster             Allergies:     Allergies   Allergen Reactions    Ketorolac Tromethamine Difficulty breathing     Shortness of breath to tablets only per the patient    Nsaids Difficulty breathing     Increased creatinine    Celecoxib Itching and Rash    Morphine And Codeine  Itching     With higher doses. Pt denies this allergy 11/16/2023    Codeine Itching    Conjugated Estrogens     Crestor [Rosuvastatin] Muscle Pain (Myalgia)    No Clinical Screening - See Comments Itching     Fragrance    Vioxx Other (See Comments)     Heart races    Sulfa Antibiotics Rash             Medications:     Current Facility-Administered Medications   Medication Dose Route Frequency Provider Last Rate Last Admin    acetaminophen (TYLENOL) tablet 650 mg  650 mg Oral Q4H PRN Alfredo Winters DO        Or    acetaminophen (TYLENOL) Suppository 650 mg  650 mg Rectal Q4H PRN Alfredo Winters DO        acetaminophen (TYLENOL) tablet 1,000 mg  1,000 mg Oral 4x Daily Aguilar Martinez MD        amLODIPine (NORVASC) tablet 2.5 mg  2.5 mg Oral Daily PRN Aguilar Martinez MD        aspirin EC tablet 81 mg  81 mg Oral Daily with lunch Aguilar Martinez MD        carvedilol (COREG) tablet 12.5 mg  12.5 mg Oral BID w/meals Aguilar Martinez MD        glucose gel 15-30 g  15-30 g Oral Q15 Min PRN Alfredo Winters DO        Or    dextrose 50 % injection 25-50 mL  25-50 mL Intravenous Q15 Min PRN Alfredo Winters DO        Or    glucagon injection 1 mg  1 mg Subcutaneous Q15 Min PRN Alfredo Winters DO        HYDROmorphone (DILAUDID) injection 0.2 mg  0.2 mg Intravenous Q2H PRN Alfredo Winters DO   0.2 mg at 08/18/24 1025    HYDROmorphone (DILAUDID) injection 0.4 mg  0.4 mg Intravenous Q2H PRN Alfredo Winters DO        hydrOXYzine HCl (ATARAX) tablet 25 mg  25 mg Oral Q6H PRN Alfredo Winters DO        Or    hydrOXYzine HCl (ATARAX) tablet 50 mg  50 mg Oral Q6H PRN Alfredo Winters DO        insulin aspart (NovoLOG) injection (RAPID ACTING)  1-7 Units Subcutaneous Q4H Alfredo Winters DO        levothyroxine (SYNTHROID/LEVOTHROID) tablet 112 mcg  112 mcg Oral Daily Aguilar Martinez MD        Lidocaine (LIDOCARE) 4 % Patch 2 patch  2 patch Transdermal Q24H Alfredo Winters,  DO   2 patch at 08/18/24 0743    melatonin tablet 3 mg  3 mg Oral At Bedtime PRN Alfredo Winters DO        methocarbamol (ROBAXIN) tablet 500-1,000 mg  500-1,000 mg Oral 4x Daily PRN Aguilar Martinez MD        naloxone (NARCAN) injection 0.2 mg  0.2 mg Intravenous Q2 Min PRN Alfredo Winters DO        Or    naloxone (NARCAN) injection 0.4 mg  0.4 mg Intravenous Q2 Min PRN Alfredo Winters DO        Or    naloxone (NARCAN) injection 0.2 mg  0.2 mg Intramuscular Q2 Min PRN Alfredo Winters DO        Or    naloxone (NARCAN) injection 0.4 mg  0.4 mg Intramuscular Q2 Min PRN Alfredo Winters DO        ondansetron (ZOFRAN ODT) ODT tab 4 mg  4 mg Oral Q6H PRN Alfredo Winters DO        Or    ondansetron (ZOFRAN) injection 4 mg  4 mg Intravenous Q6H PRN Alfredo Winters DO        oxyCODONE (ROXICODONE) tablet 5 mg  5 mg Oral Q4H PRN Alfredo Winters DO        oxyCODONE IR (ROXICODONE) half-tab 2.5 mg  2.5 mg Oral Q4H PRN Alfredo Winters DO   2.5 mg at 08/18/24 1153    polyethylene glycol (MIRALAX) Packet 17 g  17 g Oral BID PRN Alfredo Winters DO        senna-docusate (SENOKOT-S/PERICOLACE) 8.6-50 MG per tablet 1 tablet  1 tablet Oral BID PRAlfredo Mitchell DO        Or    senna-docusate (SENOKOT-S/PERICOLACE) 8.6-50 MG per tablet 2 tablet  2 tablet Oral BID PRAlfredo Mitchell DO                 Review of Systems:   CV: NEGATIVE for chest pain, palpitations or peripheral edema  C: NEGATIVE for fever, chills, change in weight  E/M: NEGATIVE for ear, mouth and throat problems  R: NEGATIVE for significant cough or SOB          Physical Exam:   All vitals have been reviewed  Patient Vitals for the past 24 hrs:   BP Temp Temp src Pulse Resp SpO2 Height Weight   08/18/24 1230 (!) 157/42 97  F (36.1  C) Temporal 68 18 100 % -- --   08/18/24 1146 131/63 98.5  F (36.9  C) Oral -- 18 96 % -- --   08/18/24 1059 131/63 97.3  F (36.3  C) Oral 63 18 -- -- --   08/18/24 0700 (!) 152/54 97.2   F (36.2  C) Oral 74 18 95 % -- --   08/18/24 0342 137/54 97.8  F (36.6  C) Oral 69 18 -- -- --   08/17/24 2315 127/47 -- -- 69 -- 95 % -- --   08/17/24 2310 126/44 -- -- -- -- -- -- --   08/17/24 2100 (!) 154/70 -- -- 69 -- 91 % -- --   08/17/24 2045 138/73 -- -- 70 -- 91 % -- --   08/17/24 2030 (!) 142/65 -- -- 72 -- 90 % -- --   08/17/24 2015 (!) 149/56 -- -- 68 -- 93 % -- --   08/17/24 2006 -- -- -- -- -- -- 1.524 m (5') 93.4 kg (205 lb 14.6 oz)   08/17/24 1958 (!) 152/72 97.8  F (36.6  C) Oral 69 20 95 % -- --       Intake/Output Summary (Last 24 hours) at 8/18/2024 1403  Last data filed at 8/18/2024 1159  Gross per 24 hour   Intake --   Output 500 ml   Net -500 ml     Left arm pain: patient NVID in the LUE.   Brisk capillary refill  Sensation intact to light touch distally LUE  No pain with ROM at the left elbow.   Ttp over the distal biceps tendon.   No significant bruising/edema/deformity noted.           Data:   All laboratory data reviewed  Results for orders placed or performed during the hospital encounter of 08/17/24   XR Shoulder Left G/E 3 Views     Status: None    Narrative    EXAM: XR SHOULDER LEFT G/E 3 VIEWS  LOCATION: [facility]  DATE: 8/17/2024    INDICATION: left shoulder pain  COMPARISON: 8/31/2023      Impression    IMPRESSION: Left shoulder arthroplasty. No hardware complication. No acute fracture or dislocation.   Humerus XR,  G/E 2 views, left     Status: None    Narrative    EXAM: XR HUMERUS LEFT G/E 2 VIEWS  LOCATION: [facility]  DATE: 8/17/2024    INDICATION: Left humerus pain.  COMPARISON: None.      Impression    IMPRESSION: Left shoulder arthroplasty. No visible fracture or dislocation.   US Upper Extremity Venous Duplex Left     Status: None    Narrative    EXAM: US UPPER EXTREMITY VENOUS DUPLEX LEFT  LOCATION: [facility]  DATE: 8/17/2024    INDICATION: Left arm pain, DVT eval  COMPARISON: None.  TECHNIQUE: Venous  Duplex ultrasound of the left upper extremity with (when possible) and without compression, augmentation, and duplex. Color flow and spectral Doppler with waveform analysis performed.    FINDINGS: Ultrasound includes evaluation of the internal jugular vein, innominate vein, subclavian vein, axillary vein, and brachial vein. The superficial cephalic and basilic veins were also evaluated where seen.     LEFT: No deep venous thrombosis. No superficial thrombophlebitis.       Impression    IMPRESSION:   1.  No deep venous thrombosis in the left upper extremity.   Hazard Draw     Status: None    Narrative    The following orders were created for panel order Hazard Draw.  Procedure                               Abnormality         Status                     ---------                               -----------         ------                     Extra Green Top (Lithium...[[id number]]                      Final result               Extra Purple Top Tube[[id number]]                            Final result                 Please view results for these tests on the individual orders.   Extra Green Top (Lithium Heparin) Tube     Status: None   Result Value Ref Range    Hold Specimen JIC    Extra Purple Top Tube     Status: None   Result Value Ref Range    Hold Specimen JIC    Basic metabolic panel     Status: Abnormal   Result Value Ref Range    Sodium 141 135 - 145 mmol/L    Potassium 4.5 3.4 - 5.3 mmol/L    Chloride 106 98 - 107 mmol/L    Carbon Dioxide (CO2) 21 (L) 22 - 29 mmol/L    Anion Gap 14 7 - 15 mmol/L    Urea Nitrogen 39.4 (H) 8.0 - 23.0 mg/dL    Creatinine 1.52 (H) 0.51 - 0.95 mg/dL    GFR Estimate 35 (L) >60 mL/min/1.73m2    Calcium 8.8 8.8 - 10.4 mg/dL    Glucose 108 (H) 70 - 99 mg/dL   CBC with platelets     Status: Abnormal   Result Value Ref Range    WBC Count 7.4 4.0 - 11.0 10e3/uL    RBC Count 3.58 (L) 3.80 - 5.20 10e6/uL    Hemoglobin 10.9 (L) 11.7 - 15.7 g/dL    Hematocrit 33.4 (L) 35.0 - 47.0 %    MCV 93 78  - 100 fL    MCH 30.4 26.5 - 33.0 pg    MCHC 32.6 31.5 - 36.5 g/dL    RDW 12.2 10.0 - 15.0 %    Platelet Count 205 150 - 450 10e3/uL   Glucose by meter     Status: Normal   Result Value Ref Range    GLUCOSE BY METER POCT 80 70 - 99 mg/dL   Glucose by meter     Status: Normal   Result Value Ref Range    GLUCOSE BY METER POCT 86 70 - 99 mg/dL   Glucose by meter     Status: Normal   Result Value Ref Range    GLUCOSE BY METER POCT 82 70 - 99 mg/dL          Attestation:  I have reviewed today's vital signs, notes, medications, labs and imaging with Dr. Solomon.  Amount of time performed on this consult: 15 minutes.    Jimbo Gama PA-C

## 2024-08-18 NOTE — ED NOTES
"[facility]  ED Nurse Handoff Report    ED Chief complaint: Arm Pain  . ED Diagnosis:   Final diagnoses:   Pain of left upper arm   Acute pain of left shoulder       Allergies:   Allergies   Allergen Reactions    Ketorolac Tromethamine Difficulty breathing     Shortness of breath to tablets only per the patient    Nsaids Difficulty breathing     Increased creatinine    Celecoxib Itching and Rash    Morphine And Codeine Itching     With higher doses. Pt denies this allergy 11/16/2023    Codeine Itching    Conjugated Estrogens     Crestor [Rosuvastatin] Muscle Pain (Myalgia)    No Clinical Screening - See Comments Itching     Fragrance    Vioxx Other (See Comments)     Heart races    Sulfa Antibiotics Rash       Code Status: Full Code    Activity level - Baseline/Home:  walker.  Activity Level - Current:   assist of 2.   Lift room needed: No.   Bariatric: No   Needed: No   Isolation: Yes.   Infection: MRSA hx.     Respiratory status: Room air    Vital Signs (within 30 minutes):   Vitals:    08/17/24 2045 08/17/24 2100 08/17/24 2310 08/17/24 2315   BP: 138/73 (!) 154/70 126/44 127/47   Pulse: 70 69  69   Resp:       Temp:       TempSrc:       SpO2: 91% 91%  95%   Weight:       Height:           Cardiac Rhythm:  ,      Pain level:    Patient confused: {YES / NO:812035::\"Yes\"}.   Patient Falls Risk: {Fall Risk Interventions:054880}.   Elimination Status: {PACU Elimination Status:668014}     Patient Report - Initial Complaint: ***.   Focused Assessment: ***     Abnormal Results:   Labs Ordered and Resulted from Time of ED Arrival to Time of ED Departure   BASIC METABOLIC PANEL - Abnormal       Result Value    Sodium 141      Potassium 4.5      Chloride 106      Carbon Dioxide (CO2) 21 (*)     Anion Gap 14      Urea Nitrogen 39.4 (*)     Creatinine 1.52 (*)     GFR Estimate 35 (*)     Calcium 8.8      Glucose 108 (*)    CBC WITH PLATELETS - Abnormal    WBC Count 7.4      RBC Count 3.58 (*)  "    Hemoglobin 10.9 (*)     Hematocrit 33.4 (*)     MCV 93      MCH 30.4      MCHC 32.6      RDW 12.2      Platelet Count 205     GLUCOSE MONITOR NURSING POCT        US Upper Extremity Venous Duplex Left   Final Result   IMPRESSION:    1.  No deep venous thrombosis in the left upper extremity.      XR Shoulder Left G/E 3 Views   Final Result   IMPRESSION: Left shoulder arthroplasty. No hardware complication. No acute fracture or dislocation.      Humerus XR,  G/E 2 views, left   Final Result   IMPRESSION: Left shoulder arthroplasty. No visible fracture or dislocation.          Treatments provided: see MAR  Family Comments: daughter   OBS brochure/video discussed/provided to patient:  {Yes No NA:542702}  ED Medications:   Medications   acetaminophen (TYLENOL) tablet 650 mg (has no administration in time range)     Or   acetaminophen (TYLENOL) Suppository 650 mg (has no administration in time range)   oxyCODONE IR (ROXICODONE) half-tab 2.5 mg (has no administration in time range)   oxyCODONE (ROXICODONE) tablet 5 mg (has no administration in time range)   HYDROmorphone (DILAUDID) injection 0.2 mg (has no administration in time range)   HYDROmorphone (DILAUDID) injection 0.4 mg (has no administration in time range)   melatonin tablet 3 mg (has no administration in time range)   senna-docusate (SENOKOT-S/PERICOLACE) 8.6-50 MG per tablet 1 tablet (has no administration in time range)     Or   senna-docusate (SENOKOT-S/PERICOLACE) 8.6-50 MG per tablet 2 tablet (has no administration in time range)   polyethylene glycol (MIRALAX) Packet 17 g (has no administration in time range)   ondansetron (ZOFRAN ODT) ODT tab 4 mg (has no administration in time range)     Or   ondansetron (ZOFRAN) injection 4 mg (has no administration in time range)   glucose gel 15-30 g (has no administration in time range)     Or   dextrose 50 % injection 25-50 mL (has no administration in time range)     Or   glucagon injection 1 mg (has no  administration in time range)   insulin aspart (NovoLOG) injection (RAPID ACTING) (has no administration in time range)   hydrOXYzine HCl (ATARAX) tablet 25 mg (has no administration in time range)     Or   hydrOXYzine HCl (ATARAX) tablet 50 mg (has no administration in time range)   Lidocaine (LIDOCARE) 4 % Patch 2 patch (has no administration in time range)   oxyCODONE (ROXICODONE) tablet 5 mg (5 mg Oral $Given 8/17/24 2058)   morphine (PF) injection 4 mg (4 mg Intravenous $Given 8/17/24 2234)   ondansetron (ZOFRAN) injection 4 mg (4 mg Intravenous $Given 8/17/24 2235)       Drips infusing:  {NO OR YES:469275}  For the majority of the shift this patient was {traffic light colors:364314}.   Interventions performed were ***.    Sepsis treatment initiated: {Sepsis or Shock Y/N:765772}    Cares/treatment/interventions/medications to be completed following ED care: ***    ED Nurse Name: Daylin Fuentes RN  12:02 AM

## 2024-08-19 ENCOUNTER — APPOINTMENT (OUTPATIENT)
Dept: PHYSICAL THERAPY | Facility: CLINIC | Age: 79
DRG: 563 | End: 2024-08-19
Attending: INTERNAL MEDICINE
Payer: MEDICARE

## 2024-08-19 ENCOUNTER — PATIENT OUTREACH (OUTPATIENT)
Dept: CARE COORDINATION | Facility: CLINIC | Age: 79
End: 2024-08-19
Payer: MEDICARE

## 2024-08-19 LAB
GLUCOSE BLDC GLUCOMTR-MCNC: 106 MG/DL (ref 70–99)
GLUCOSE BLDC GLUCOMTR-MCNC: 107 MG/DL (ref 70–99)
GLUCOSE BLDC GLUCOMTR-MCNC: 110 MG/DL (ref 70–99)
GLUCOSE BLDC GLUCOMTR-MCNC: 137 MG/DL (ref 70–99)
GLUCOSE BLDC GLUCOMTR-MCNC: 183 MG/DL (ref 70–99)

## 2024-08-19 PROCEDURE — 99232 SBSQ HOSP IP/OBS MODERATE 35: CPT | Performed by: INTERNAL MEDICINE

## 2024-08-19 PROCEDURE — 250N000013 HC RX MED GY IP 250 OP 250 PS 637: Performed by: HOSPITALIST

## 2024-08-19 PROCEDURE — 250N000013 HC RX MED GY IP 250 OP 250 PS 637: Performed by: INTERNAL MEDICINE

## 2024-08-19 PROCEDURE — 999N000040 HC STATISTIC CONSULT NO CHARGE VASC ACCESS

## 2024-08-19 PROCEDURE — 120N000001 HC R&B MED SURG/OB

## 2024-08-19 PROCEDURE — 97530 THERAPEUTIC ACTIVITIES: CPT | Mod: GP | Performed by: PHYSICAL THERAPIST

## 2024-08-19 PROCEDURE — 97161 PT EVAL LOW COMPLEX 20 MIN: CPT | Mod: GP | Performed by: PHYSICAL THERAPIST

## 2024-08-19 RX ORDER — FAMOTIDINE 20 MG/1
40 TABLET, FILM COATED ORAL EVERY EVENING
Status: DISCONTINUED | OUTPATIENT
Start: 2024-08-19 | End: 2024-08-19 | Stop reason: DRUGHIGH

## 2024-08-19 RX ORDER — FAMOTIDINE 20 MG/1
20 TABLET, FILM COATED ORAL EVERY EVENING
Status: DISCONTINUED | OUTPATIENT
Start: 2024-08-19 | End: 2024-08-21 | Stop reason: HOSPADM

## 2024-08-19 RX ADMIN — CARVEDILOL 12.5 MG: 6.25 TABLET, FILM COATED ORAL at 08:39

## 2024-08-19 RX ADMIN — ACETAMINOPHEN 1000 MG: 500 TABLET, FILM COATED ORAL at 14:32

## 2024-08-19 RX ADMIN — ACETAMINOPHEN 1000 MG: 500 TABLET, FILM COATED ORAL at 08:39

## 2024-08-19 RX ADMIN — AMLODIPINE BESYLATE 2.5 MG: 2.5 TABLET ORAL at 22:57

## 2024-08-19 RX ADMIN — OXYCODONE HYDROCHLORIDE 5 MG: 5 TABLET ORAL at 08:40

## 2024-08-19 RX ADMIN — OXYCODONE HYDROCHLORIDE 5 MG: 5 TABLET ORAL at 02:34

## 2024-08-19 RX ADMIN — ACETAMINOPHEN 1000 MG: 500 TABLET, FILM COATED ORAL at 21:33

## 2024-08-19 RX ADMIN — ASPIRIN 81 MG: 81 TABLET, COATED ORAL at 12:13

## 2024-08-19 RX ADMIN — LEVOTHYROXINE SODIUM 112 MCG: 0.11 TABLET ORAL at 08:39

## 2024-08-19 RX ADMIN — FAMOTIDINE 20 MG: 20 TABLET ORAL at 21:33

## 2024-08-19 RX ADMIN — SENNOSIDES AND DOCUSATE SODIUM 1 TABLET: 8.6; 5 TABLET ORAL at 14:32

## 2024-08-19 RX ADMIN — LIDOCAINE 2 PATCH: 4 PATCH TOPICAL at 08:40

## 2024-08-19 RX ADMIN — OXYCODONE HYDROCHLORIDE 2.5 MG: 5 TABLET ORAL at 21:32

## 2024-08-19 RX ADMIN — CARVEDILOL 12.5 MG: 6.25 TABLET, FILM COATED ORAL at 19:11

## 2024-08-19 ASSESSMENT — ACTIVITIES OF DAILY LIVING (ADL)
ADLS_ACUITY_SCORE: 57
ADLS_ACUITY_SCORE: 58
ADLS_ACUITY_SCORE: 58
ADLS_ACUITY_SCORE: 54
ADLS_ACUITY_SCORE: 58
ADLS_ACUITY_SCORE: 54
ADLS_ACUITY_SCORE: 54
ADLS_ACUITY_SCORE: 58
ADLS_ACUITY_SCORE: 54
ADLS_ACUITY_SCORE: 58
ADLS_ACUITY_SCORE: 62
ADLS_ACUITY_SCORE: 58
ADLS_ACUITY_SCORE: 54
ADLS_ACUITY_SCORE: 58
ADLS_ACUITY_SCORE: 58
ADLS_ACUITY_SCORE: 54
ADLS_ACUITY_SCORE: 58
DEPENDENT_IADLS:: TRANSPORTATION
ADLS_ACUITY_SCORE: 54
ADLS_ACUITY_SCORE: 58
ADLS_ACUITY_SCORE: 62

## 2024-08-19 NOTE — PLAN OF CARE
"Goal Outcome Evaluation:    VS Stable. Weaned off of 02, maintains sats around 96. A&Ox4. Pain controlled with oxycodone and IV dilaudid. Midline placed today. +3 swelling in extremities. Cellulitis on LLE. Pt was able to stand and pivot in order to transport to MRI. Voiding via purewick. Colostomy intact. BG monitoring patch & lidocaine patches taken off for MRI. BG monitored, no insulin given today as  Problem: Adult Inpatient Plan of Care  Goal: Plan of Care Review  Description: The Plan of Care Review/Shift note should be completed every shift.  The Outcome Evaluation is a brief statement about your assessment that the patient is improving, declining, or no change.  This information will be displayed automatically on your shift  note.  Outcome: Progressing  Flowsheets (Taken 8/18/2024 2251)  Plan of Care Reviewed With: patient  Overall Patient Progress: improving  Goal: Patient-Specific Goal (Individualized)  Description: You can add care plan individualizations to a care plan. Examples of Individualization might be:  \"Parent requests to be called daily at 9am for status\", \"I have a hard time hearing out of my right ear\", or \"Do not touch me to wake me up as it startles  me\".  Outcome: Progressing  Goal: Absence of Hospital-Acquired Illness or Injury  Outcome: Progressing  Intervention: Identify and Manage Fall Risk  Recent Flowsheet Documentation  Taken 8/18/2024 1801 by Jalyn Conte, RN  Safety Promotion/Fall Prevention:   assistive device/personal items within reach   activity supervised  Intervention: Prevent Skin Injury  Recent Flowsheet Documentation  Taken 8/18/2024 1555 by Jalyn Conte, RN  Body Position: supine, head elevated  Goal: Optimal Comfort and Wellbeing  Outcome: Progressing  Intervention: Monitor Pain and Promote Comfort  Recent Flowsheet Documentation  Taken 8/18/2024 1555 by Jalyn Conte, RN  Pain Management Interventions:   medication (see MAR)   cold applied  Goal: Readiness " for Transition of Care  Outcome: Progressing  Flowsheets (Taken 8/18/2024 2100)  Transportation Anticipated: family or friend will provide  Intervention: Mutually Develop Transition Plan  Recent Flowsheet Documentation  Taken 8/18/2024 2100 by Jalyn Conte RN  Transportation Anticipated: family or friend will provide  Patient/Family Anticipated Services at Transition: none  Patient/Family Anticipates Transition to: home  Equipment Currently Used at Home: walker, rolling     Problem: Pain Acute  Goal: Optimal Pain Control and Function  Outcome: Progressing  Intervention: Develop Pain Management Plan  Recent Flowsheet Documentation  Taken 8/18/2024 1555 by Jalyn Conte RN  Pain Management Interventions:   medication (see MAR)   cold applied  Intervention: Prevent or Manage Pain  Recent Flowsheet Documentation  Taken 8/18/2024 1801 by Jalyn Conte RN  Medication Review/Management: medications reviewed     Problem: Diabetes  Goal: Optimal Coping  Outcome: Progressing  Goal: Optimal Functional Ability  Outcome: Progressing  Intervention: Optimize Functional Ability  Recent Flowsheet Documentation  Taken 8/18/2024 1555 by Jalyn Conte RN  Assistive Device Utilized: lift device  Activity Management:   activity adjusted per tolerance   bedrest  Activity Assistance Provided: assistance, 2 people  Goal: Blood Glucose Level Within Target Range  Outcome: Progressing  Goal: Minimize Risk of Hypoglycemia  Outcome: Progressing     Problem: Comorbidity Management  Goal: Blood Glucose Levels Within Targeted Range  Outcome: Progressing  Intervention: Monitor and Manage Glycemia  Recent Flowsheet Documentation  Taken 8/18/2024 1801 by Jalyn Conte RN  Medication Review/Management: medications reviewed  Goal: Blood Pressure in Desired Range  Outcome: Progressing  Intervention: Maintain Blood Pressure Management  Recent Flowsheet Documentation  Taken 8/18/2024 1801 by Jalyn Conte, RN  Medication  Review/Management: medications reviewed    pt is NPO and hasn't met insulin parameters. Pt currently down at MRI. Plan TBD.       Plan of Care Reviewed With: patient    Overall Patient Progress: improvingOverall Patient Progress: improving

## 2024-08-19 NOTE — PROGRESS NOTES
Clinic Care Coordination Contact  Ambulatory Care Coordination to Inpatient Care Management   Hand-In Communication    Date:  August 19, 2024  Name: Chasity Hodgson is enrolled in Ambulatory Care Coordination program and I am the Lead Care Coordinator.  CC Contact Information: Epic InAll Copy Productssket + phone  Payor Source: Payor: MEDICARE / Plan: MEDICARE / Product Type: Medicare /   Current services in place:     Please see the CC Snaphot and Care Management Flowsheets for specific details of this Chasity Hodgson care plan.   Additional details/specific concerns r/t this admission:    No additional concerns at this time      I will follow this admission in Epic. Please feel free to contact me with questions or for further collaboration in discharge planning.    Kaelyn Hennessy,  [facility]  Clinic Care Coordinator  [facility]  [phone]  [email or web address]

## 2024-08-19 NOTE — CONSULTS
Care Management Initial Consult    General Information  Assessment completed with: Patient,    Type of CM/SW Visit: Initial Assessment    Primary Care Provider verified and updated as needed: Yes   Readmission within the last 30 days: no previous admission in last 30 days      Reason for Consult: discharge planning      Communication Assessment  Patient's communication style: spoken language (English or Bilingual)    Hearing Difficulty or Deaf: no   Wear Glasses or Blind: yes    Cognitive  Cognitive/Neuro/Behavioral: WDL                      Living Environment:   People in home: alone     Current living Arrangements: house      Able to return to prior arrangements:  (Needs TCU before returning home)     Family/Social Support:  Care provided by: self  Provides care for: no one  Marital Status:   Children          Description of Support System: Supportive, Involved       Current Resources:   Patient receiving home care services: No     Community Resources: Transitional Care  Equipment currently used at home: walker, rolling, walker, neha, walker, standard, cane, straight, grab bar, toilet, grab bar, tub/shower, raised toilet seat, shower chair    Does the patient's insurance plan have a 3 day qualifying hospital stay waiver?  No    Lifestyle & Psychosocial Needs:  Social Determinants of Health     Food Insecurity: Low Risk  (11/20/2023)    Food Insecurity     Within the past 12 months, did you worry that your food would run out before you got money to buy more?: No     Within the past 12 months, did the food you bought just not last and you didn t have money to get more?: No   Depression: Not at risk (7/24/2024)    PHQ-2     PHQ-2 Score: 0   Housing Stability: Low Risk  (11/20/2023)    Housing Stability     Do you have housing? : Yes     Are you worried about losing your housing?: No   Tobacco Use: Low Risk  (8/16/2024)    Patient History     Smoking Tobacco Use: Never     Smokeless Tobacco Use: Never      Passive Exposure: Never   Financial Resource Strain: Low Risk  (11/20/2023)    Financial Resource Strain     Within the past 12 months, have you or your family members you live with been unable to get utilities (heat, electricity) when it was really needed?: No   Alcohol Use: Not At Risk (11/4/2020)    [facility]     Frequency of Alcohol Consumption: Never     Average Number of Drinks: Not asked     Frequency of Binge Drinking: Never   Transportation Needs: High Risk (11/20/2023)    Transportation Needs     Within the past 12 months, has lack of transportation kept you from medical appointments, getting your medicines, non-medical meetings or appointments, work, or from getting things that you need?: Yes   Physical Activity: Not on file   Interpersonal Safety: Low Risk  (7/24/2024)    Interpersonal Safety     Do you feel physically and emotionally safe where you currently live?: Yes     Within the past 12 months, have you been hit, slapped, kicked or otherwise physically hurt by someone?: No     Within the past 12 months, have you been humiliated or emotionally abused in other ways by your partner or ex-partner?: No   Stress: Not on file   Social Connections: Unknown (12/31/2021)    [facility]    Social Connections     Frequency of Communication with Friends and Family: Not on file   Health Literacy: Not on file       Functional Status:  Prior to admission patient needed assistance:   Dependent ADLs:: Ambulation-walker  Dependent IADLs:: Transportation     Additional Information:  CM/SW consulted for discharge planning. Pt admitted left arm pain, left biceps tendinopathy.  Met with pt at bedside. [facility] [place] [facility] [place] all needs can be on one level. Uses a walker at baseline. Pt is mostly independent with ADLs and IADLs other than use of walker and  pt is not longer driving.   Therapy is recommending TCU and pt is in agreement. Pt would like a referral sent to [facility] as she has been there previously. Referral sent.  Informed pt may additional choices may be required.  Transportation to TCU TBD.  Will continue to follow for discharge planning.    ADDENDUM: Pt was offered a bed at [facility] for 8/21/2024. [facility] is asking that pt bring a couple of ostomy bags along until they can get some ordered.    Sharon Lewis RN   Inpatient Care Coordination  [facility]   Phone: [phone]

## 2024-08-19 NOTE — PLAN OF CARE
"Goal Outcome Evaluation:      Plan of Care Reviewed With: patient    Overall Patient Progress: improvingOverall Patient Progress: improving    Outcome Evaluation: Pt alert, pain to the left shoulder, patch placed and arm in sling. Midline to the right arm flused well. [facility] will come change this afternoon the dressing. Oxycodone for pain gave 1x this shift, blood sugas stable, Mod carb diets. assist of 1, moves well but slow. Fall risk. Contact MRSA. MRI negative, plan to dc to tcu wednesday.    Problem: Adult Inpatient Plan of Care  Goal: Plan of Care Review  Description: The Plan of Care Review/Shift note should be completed every shift.  The Outcome Evaluation is a brief statement about your assessment that the patient is improving, declining, or no change.  This information will be displayed automatically on your shift  note.  Outcome: Progressing  Flowsheets (Taken 8/19/2024 3892)  Outcome Evaluation: Pt alert, pain to the left shoulder, patch placed and arm in sling. Midline to the right arm flused well. [facility] will come change this afternoon the dressing. Oxycodone for pain gave 1x this shift, blood sugas stable, Mod carb diets. assist of 1, moves well but slow. Fall risk. Contact MRSA. MRI negative, plan to dc to tcu wednesday.  Plan of Care Reviewed With: patient  Overall Patient Progress: improving  Goal: Patient-Specific Goal (Individualized)  Description: You can add care plan individualizations to a care plan. Examples of Individualization might be:  \"Parent requests to be called daily at 9am for status\", \"I have a hard time hearing out of my right ear\", or \"Do not touch me to wake me up as it startles  me\".  Outcome: Progressing  Goal: Absence of Hospital-Acquired Illness or Injury  Outcome: Progressing  Intervention: Identify and Manage Fall Risk  Recent Flowsheet Documentation  Taken 8/19/2024 0900 by My Thakur RN  Safety Promotion/Fall Prevention:   assistive device/personal items within reach   " activity supervised  Intervention: Prevent Skin Injury  Recent Flowsheet Documentation  Taken 8/19/2024 0900 by My Thakur RN  Body Position:   turned   position maintained  Intervention: Prevent and Manage VTE (Venous Thromboembolism) Risk  Recent Flowsheet Documentation  Taken 8/19/2024 0900 by My Thakur RN  VTE Prevention/Management: compression stockings on  Intervention: Prevent Infection  Recent Flowsheet Documentation  Taken 8/19/2024 0900 by My Thakur RN  Infection Prevention: personal protective equipment utilized  Goal: Optimal Comfort and Wellbeing  Outcome: Progressing  Intervention: Monitor Pain and Promote Comfort  Recent Flowsheet Documentation  Taken 8/19/2024 0938 by My Thakur RN  Pain Management Interventions:   repositioned   quiet environment facilitated  Taken 8/19/2024 0910 by My Thakur RN  Pain Management Interventions:   medication (see MAR)   cold applied  Goal: Readiness for Transition of Care  Outcome: Progressing     Problem: Pain Acute  Goal: Optimal Pain Control and Function  Outcome: Progressing  Intervention: Develop Pain Management Plan  Recent Flowsheet Documentation  Taken 8/19/2024 0938 by My Thakur RN  Pain Management Interventions:   repositioned   quiet environment facilitated  Taken 8/19/2024 0910 by My Thakur RN  Pain Management Interventions:   medication (see MAR)   cold applied  Intervention: Prevent or Manage Pain  Recent Flowsheet Documentation  Taken 8/19/2024 0900 by My Thakur RN  Medication Review/Management: medications reviewed     Problem: Diabetes  Goal: Optimal Coping  Outcome: Progressing  Goal: Optimal Functional Ability  Outcome: Progressing  Intervention: Optimize Functional Ability  Recent Flowsheet Documentation  Taken 8/19/2024 0910 by My Thakur RN  Activity Management:   activity adjusted per tolerance   bedrest  Taken 8/19/2024 0900 by My Thakur RN  Assistive Device Utilized:   walker   lift  device  Activity Management:   activity adjusted per tolerance   bedrest  Activity Assistance Provided: assistance, 2 people  Goal: Blood Glucose Level Within Target Range  Outcome: Progressing  Intervention: Optimize Glycemic Control  Recent Flowsheet Documentation  Taken 8/19/2024 0900 by My Thakur, RN  Hyperglycemia Management: blood glucose monitored  Goal: Minimize Risk of Hypoglycemia  Outcome: Progressing  Intervention: Management and Risk Reduction of Hypoglycemia  Recent Flowsheet Documentation  Taken 8/19/2024 0900 by My Thakur, RN  Hypoglycemia Management: blood glucose monitored     Problem: Comorbidity Management  Goal: Blood Glucose Levels Within Targeted Range  Outcome: Progressing  Intervention: Monitor and Manage Glycemia  Recent Flowsheet Documentation  Taken 8/19/2024 0900 by My Thakru, RN  Glycemic Management: blood glucose monitored  Medication Review/Management: medications reviewed  Goal: Blood Pressure in Desired Range  Outcome: Progressing  Intervention: Maintain Blood Pressure Management  Recent Flowsheet Documentation  Taken 8/19/2024 0900 by My Thakur, RN  Medication Review/Management: medications reviewed

## 2024-08-19 NOTE — PROGRESS NOTES
08/19/24 1036   Appointment Info   Signing Clinician's Name / Credentials (PT) Nik Odom DPT   Rehab Comments (PT) ROM L UE elbow/shoulder as tolerated, sling for comfort   Living Environment   Living Environment Comments Pt lives in [facility] by self [address] [place] with B grabbars. All needs met on main level. Use of 4ww/FWW for all mobility.   Self-Care   Usual Activity Tolerance moderate   Current Activity Tolerance fair   Equipment Currently Used at Home walker, rolling;walker, standard;cane, straight;walker, neha;grab bar, toilet;grab bar, tub/shower;shower chair   Fall history within last six months no   General Information   Onset of Illness/Injury or Date of Surgery 08/17/24   Referring Physician Aguilar Martinez MD   Patient/Family Therapy Goals Statement (PT) To improve pain, mobility   Pertinent History of Current Problem (include personal factors and/or comorbidities that impact the POC) Per H&P, pt is a 78 year old female with a history of coronary artery disease, stage II chronic kidney disease, carotid artery stenosis, hypothyroidism, GERD, diabetes mellitus, depression, hyperlipidemia, ANNETTE, and PE who presents with left arm pain after twisting and putting on a blood pressure cuff.   Existing Precautions/Restrictions fall  (sling for comfort L UE, progress activity as tolerated)   Cognition   Affect/Mental Status (Cognition) WFL   Orientation Status (Cognition) oriented x 4   Follows Commands (Cognition) WFL   Pain Assessment   Patient Currently in Pain Yes, see Vital Sign flowsheet  (L shoulder)   Range of Motion (ROM)   ROM Comment decreased R LE 2/2 strength, foot drop   Strength (Manual Muscle Testing)   Strength Comments decreased R LE strength, no ankle DF; post polio, decreased L UE strength 2/2 pain   Bed Mobility   Comment, (Bed Mobility) Charli supine to sit with HOB raised   Transfers   Comment, (Transfers) Charli sit <> stand with HHA R UE   Gait/Stairs (Locomotion)   Distance in Feet  (Gait) 10   Pattern (Gait) step-to   Deviations/Abnormal Patterns (Gait) base of support, wide;gait speed decreased;stride length decreased;weight shifting decreased   Comment, (Gait/Stairs) Charli HHA R UE   Balance   Balance Comments good sitting; fair standing with HHA R UE   Clinical Impression   Criteria for Skilled Therapeutic Intervention Yes, treatment indicated   PT Diagnosis (PT) impaired functional mobility   Influenced by the following impairments impaired R LE strength, ROM, impaired balance   Functional limitations due to impairments impaired bed mobility, transfers, ambulation, stairs   Clinical Presentation (PT Evaluation Complexity) evolving   Clinical Presentation Rationale Current functional status, living environment   Clinical Decision Making (Complexity) low complexity   Planned Therapy Interventions (PT) balance training;bed mobility training;gait training;cryotherapy;home exercise program;neuromuscular re-education;patient/family education;ROM (range of motion);stair training;strengthening;transfer training;progressive activity/exercise   Risk & Benefits of therapy have been explained evaluation/treatment results reviewed;care plan/treatment goals reviewed;risks/benefits reviewed;current/potential barriers reviewed;participants voiced agreement with care plan;participants included;patient   PT Total Evaluation Time   PT Eval, Low Complexity Minutes (74277) 10   Physical Therapy Goals   PT Frequency 5x/week   PT Predicted Duration/Target Date for Goal Attainment 08/22/24   PT Goals Bed Mobility;Transfers;Gait   PT: Bed Mobility Modified independent;Supine to/from sit   PT: Transfers Modified independent;Sit to/from stand;Assistive device   PT: Gait Modified independent;Assistive device;100 feet   Total Session Time   Total Session Time (sum of timed and untimed services) 10

## 2024-08-19 NOTE — PROGRESS NOTES
Pt visited for CPAP @ HS order. Pt states she had a sleep study done, but was told her ANNETTE is mild, therefore does not wear a CPAP at home and does not feel the need for one here. Pt is aware if she changes her mind, she can let us know!    Josette Joseph, RT

## 2024-08-19 NOTE — PROGRESS NOTES
[facility]    Medicine Progress Note - Hospitalist Service    Date of Admission:  8/17/2024    Primary Care Physician   Shola Benoit MD  CONSULTANTS: ortho    Assessment & Plan     Chasity Hodgson is a 78 year old female with a history of coronary artery disease, stage II chronic kidney disease, carotid artery stenosis, hypothyroidism, GERD, diabetes mellitus, depression, hyperlipidemia, ANNETTE, and PE who presents with left arm pain after twisting and putting on a blood pressure cuff      Left arm pain, left biceps tendinopathy:  Patient presented with left arm pain after trying to put on a blood pressure cuff and twisting.  She has pain on the inner side of her elbow upper arm.  Imaging did not show any fracture nor DVT seen on ultrasound.  She had an MRI after being seen by ortho and does not have an evidence of a bicep tear but does have insertional left biceps tendinopathy.    At baseline she ambulates with a walker and currently this is not possible due to her arm injury.  Patiently to be seen by physical therapy to see if she is able to get around with a 1 arm walker as the patient lives at home alone.  Patient may require TCU if not safe to return home.     Patient's pain is currently being controlled with Tylenol, Atarax, Robaxin, oxycodone, and Dilaudid for breakthrough.  Patient's arm is in a sling.     Type 2 insulin-dependent diabetes mellitus:   Hold prior to admission Jardiance, prandial insulin, and Lantus. BS actually only  so we will only use a insulin correction scale at this time.     Need to make sure we prevent hypoglycemia so may not need to resume  her home medications at all and just watch her at a TCU and add them back if needed    GERD:   Continue pantoprazole versus Pepcid.  Not sure what patient is taking. Per pharmacy  She has both listed, does not need this as well as carafrate. Will be just on pepcid for now.     ANNETTE:   Continue nocturnal  CPAP.    Hypertension:   Blood pressure is reasonable.  Has Norvasc ordered as needed for blood pressures greater than 140.  She will continue on her home Coreg.  At this point holding her Lasix, Benicar.       Coronary disease:   Denies any chest pain.  Did undergo an angiogram in November 2023 showing nonobstructive CAD which was unchanged from 2018.  TTE during that time showed preserved EF.  Consider an EKG if she goes for operative intervention.  Continue prior to admission carvedilol and aspirin. Hold home ARB and lasix.     Hyperlipidemia:   Holding her home Zetia and Tricor.      Morbid obesity   Patient with a BMI of 40 which complicates all aspects of her cares and puts her at high risk for falls.    Hypothyroidism:   Continue levothyroxine.         Discussed plan of care with nursing , case management, social work, Ortho notes reviewed       Cardiac Monitoring: None    RESTRAINTS: Not indicated  Code Status: Full Code         This document was created using voice recognition technology.  Please excuse any typographical errors that may have occurred.  Please call with any questions.             # Drug Induced Platelet Defect: home medication list includes an antiplatelet medication   # Hypertension: Noted on problem list        # DMII: A1C = 7.3 % (Ref range: 0.0 - 5.6 %) within past 6 months   # Severe Obesity: Estimated body mass index is 40.21 kg/m  as calculated from the following:    Height as of this encounter: 1.524 m (5').    Weight as of this encounter: 93.4 kg (205 lb 14.6 oz).         # Financial/Environmental Concerns:                 Disposition Plan     Expected Discharge Date: 08/20/2024      Destination: home            Medically Ready for Discharge: Anticipated Tomorrow, awaiting physical therapy to see may require TCU at discharge      Aguilar Martinez MD  Hospitalist Service  [facility]  Securely message with FlyClip (more info)  Text page via Erly  Danae/Carlota   ______________________________________________________________________    Interval History   Patient still having significant left elbow pain unable to safely manage the walker at this time.  Still is in a sling.  Patient still getting pain medicines.  Her MRI did not show an obvious biceps tear.  Blood pressure and blood sugar stable.      ROS: A comprehensive review of systems was negative except for items noted in the HPI.  Patient currently denies any fever, chills, sweats, nausea, vomiting, diarrhea, shortness of breath, or chest pain.      Physical Exam   Vital Signs: Temp: 97.8  F (36.6  C) Temp src: Temporal BP: (!) 143/43 Pulse: 70   Resp: 18 SpO2: 98 % O2 Device: None (Room air) Oxygen Delivery: 3 LPM  Weight: 205 lbs 14.55 oz    Exam is stable from yesterday  General appearance: Patient is alert and oriented x3, no apparent distress, pleasant and conversing normally, speaking in full sentences, appears stated age, sitting in bed    HEENT:  , Mucous membranes are moist  RESPIRATORY: Clear to auscultation bilateral, good air movement  CARDIOVASCULAR: Regular rate and rhythm, normal S1/S2, no murmurs  GASTROINTESTINAL: Obese, non-distended, non-tender, soft, bowel sounds present throughout,  NEUROLOGIC:  Cranial nerves II-XII intact, without any focal deficits, strength 5/5 throughout  EXTREMITIES:  Moves all extremities, no clubbing, cyanosis, nor edema, left arm tenderness with movement is wearing a sling  :  Eugenia not present         Data         Imaging:   Results for orders placed or performed during the hospital encounter of 08/17/24   XR Shoulder Left G/E 3 Views    Narrative    EXAM: XR SHOULDER LEFT G/E 3 VIEWS  LOCATION: [facility]  DATE: 8/17/2024    INDICATION: left shoulder pain  COMPARISON: 8/31/2023      Impression    IMPRESSION: Left shoulder arthroplasty. No hardware complication. No acute fracture or dislocation.   Humerus XR,  G/E 2 views, left     Narrative    EXAM: XR HUMERUS LEFT G/E 2 VIEWS  LOCATION: [facility]  DATE: 8/17/2024    INDICATION: Left humerus pain.  COMPARISON: None.      Impression    IMPRESSION: Left shoulder arthroplasty. No visible fracture or dislocation.   US Upper Extremity Venous Duplex Left    Narrative    EXAM: US UPPER EXTREMITY VENOUS DUPLEX LEFT  LOCATION: [facility]  DATE: 8/17/2024    INDICATION: Left arm pain, DVT eval  COMPARISON: None.  TECHNIQUE: Venous Duplex ultrasound of the left upper extremity with (when possible) and without compression, augmentation, and duplex. Color flow and spectral Doppler with waveform analysis performed.    FINDINGS: Ultrasound includes evaluation of the internal jugular vein, innominate vein, subclavian vein, axillary vein, and brachial vein. The superficial cephalic and basilic veins were also evaluated where seen.     LEFT: No deep venous thrombosis. No superficial thrombophlebitis.       Impression    IMPRESSION:   1.  No deep venous thrombosis in the left upper extremity.     *Note: Due to a large number of results and/or encounters for the requested time period, some results have not been displayed. A complete set of results can be found in Results Review.     Procedures: Left elbow MRI not showing a biceps tear  I have personally have reviewed the patient's most up to date radiologic exams, labs, orders, and medications myself

## 2024-08-19 NOTE — PROGRESS NOTES
Orthopedic Surgery  Chasity Hodgson  08/19/2024     Admit Date:  8/17/2024    Left upper arm pain  S/p left shoulder hemiarthroplasty    Patient resting comfortably in bed.   Pain controlled at rest to the left upper arm, but any movement increases her pain.   No reports of numbness and tingling LUE.   Tolerating oral intake.    Afebrile, VSS.   NAEO.    Temp:  [96.7  F (35.9  C)-98.5  F (36.9  C)] 97.8  F (36.6  C)  Pulse:  [54-70] 70  Resp:  [16-18] 18  BP: (129-157)/(40-63) 143/43  SpO2:  [96 %-100 %] 98 %    Alert and oriented. NAD. Non-toxic appearing. Breathing comfortably ORA.  Left upper extremity: Skin intact to the examined areas. No erythema. Minimal ecchymosis to the left anterior upper arm. Unable to evaluate for edema or Bry deformity due to body habitus. Tender along the bicipital groove and proximal half of the biceps. Nontender over the distal biceps tendon. Patient defers any attempts at shoulder or elbow ROM currently due to pain. Able to actively range the wrist and digits. Able to make a fist. Radial pulse 2+. Brisk capillary refill. SILT A/M/R/U nerve distributions.    Labs/Imaging:  Recent Labs   Lab Test 08/17/24  2142 07/03/24  1315 05/24/24  1211   WBC 7.4 7.6 7.0   HGB 10.9* 11.6* 11.5*    193 208     Recent Labs   Lab Test 02/23/18  0625   INR 0.98     Left elbow MRI without contrast dated 8/18/24:  IMPRESSION:  1.  Minimal insertional left biceps tendinopathy, without evidence of tear.  2.  Minimal left elbow chondrosis.    A/P    Possible left long head of the biceps tendon tear vs strain  -Reviewed the above left elbow MRI findings with the patient at bedside. There are no signs of left distal biceps tendon rupture, however, her exam is concerning for a long head of the biceps strain vs tear. We discussed that even if there was an injury to this area, nonoperative treatment would be recommended and thus, we will defer any further imaging. Patient aware that it may take several  weeks for her symptoms to improve.   -Continue with analgesics, cold compresses, and sling for comfort. Patient advised to remove sling several times a day for shoulder and elbow ROM to prevent stiffness.  -Okay to progress to activity as tolerated with the left arm.  -Follow up with Dr. Angelita Solomon at [facility] in 4 weeks if symptoms persist or worsen, otherwise PRN if asymptomatic. Please call [phone] to scheduled. Ortho team will sign off.       2.  Disposition  -Per medicine team. Ortho stable.    Anne Corona PA-C  [facility]

## 2024-08-19 NOTE — PLAN OF CARE
"Goal Outcome Evaluation:      Plan of Care Reviewed With: patient    Overall Patient Progress: improvingOverall Patient Progress: improving    Outcome Evaluation: Pt is alert and oriented x4, pt is in severe pain to L arm with movement and turning. Midline to R arm flushed ok. Pt is still NPO . Oxycodone given on this shift. Voiding through external cath.      Problem: Adult Inpatient Plan of Care  Goal: Plan of Care Review  Description: The Plan of Care Review/Shift note should be completed every shift.  The Outcome Evaluation is a brief statement about your assessment that the patient is improving, declining, or no change.  This information will be displayed automatically on your shift  note.  Outcome: Progressing  Flowsheets (Taken 8/19/2024 0600)  Outcome Evaluation: Pt is alert and oriented x4, pt is in severe pain to L arm with movement and turning. Midline to R arm flushed ok. Pt is still NPO . Oxycodone given on this shift. Voiding through external cath.  Plan of Care Reviewed With: patient  Overall Patient Progress: improving  Goal: Patient-Specific Goal (Individualized)  Description: You can add care plan individualizations to a care plan. Examples of Individualization might be:  \"Parent requests to be called daily at 9am for status\", \"I have a hard time hearing out of my right ear\", or \"Do not touch me to wake me up as it startles  me\".  Outcome: Progressing  Goal: Absence of Hospital-Acquired Illness or Injury  Outcome: Progressing  Intervention: Identify and Manage Fall Risk  Recent Flowsheet Documentation  Taken 8/19/2024 0052 by Amina Carroll RN  Safety Promotion/Fall Prevention:   assistive device/personal items within reach   activity supervised  Goal: Optimal Comfort and Wellbeing  Outcome: Progressing  Goal: Readiness for Transition of Care  Outcome: Progressing     Problem: Pain Acute  Goal: Optimal Pain Control and Function  Outcome: Progressing  Intervention: Prevent or Manage " Pain  Recent Flowsheet Documentation  Taken 8/19/2024 0052 by Amina Carroll, RN  Medication Review/Management: medications reviewed     Problem: Diabetes  Goal: Optimal Coping  Outcome: Progressing  Goal: Optimal Functional Ability  Outcome: Progressing  Intervention: Optimize Functional Ability  Recent Flowsheet Documentation  Taken 8/19/2024 0052 by Amina Carroll, RN  Assistive Device Utilized: walker  Activity Management:   activity adjusted per tolerance   bedrest  Activity Assistance Provided: assistance, 2 people  Goal: Blood Glucose Level Within Target Range  Outcome: Progressing  Goal: Minimize Risk of Hypoglycemia  Outcome: Progressing     Problem: Comorbidity Management  Goal: Blood Glucose Levels Within Targeted Range  Outcome: Progressing  Intervention: Monitor and Manage Glycemia  Recent Flowsheet Documentation  Taken 8/19/2024 0052 by Amina Carroll, RN  Medication Review/Management: medications reviewed  Goal: Blood Pressure in Desired Range  Outcome: Progressing  Intervention: Maintain Blood Pressure Management  Recent Flowsheet Documentation  Taken 8/19/2024 0052 by Amina Carroll, RN  Medication Review/Management: medications reviewed

## 2024-08-20 ENCOUNTER — APPOINTMENT (OUTPATIENT)
Dept: PHYSICAL THERAPY | Facility: CLINIC | Age: 79
DRG: 563 | End: 2024-08-20
Payer: MEDICARE

## 2024-08-20 LAB
GLUCOSE BLDC GLUCOMTR-MCNC: 159 MG/DL (ref 70–99)
GLUCOSE BLDC GLUCOMTR-MCNC: 169 MG/DL (ref 70–99)
GLUCOSE BLDC GLUCOMTR-MCNC: 176 MG/DL (ref 70–99)
GLUCOSE BLDC GLUCOMTR-MCNC: 185 MG/DL (ref 70–99)
GLUCOSE BLDC GLUCOMTR-MCNC: 212 MG/DL (ref 70–99)
GLUCOSE BLDC GLUCOMTR-MCNC: 228 MG/DL (ref 70–99)

## 2024-08-20 PROCEDURE — 250N000013 HC RX MED GY IP 250 OP 250 PS 637: Performed by: INTERNAL MEDICINE

## 2024-08-20 PROCEDURE — 250N000011 HC RX IP 250 OP 636: Performed by: HOSPITALIST

## 2024-08-20 PROCEDURE — 97110 THERAPEUTIC EXERCISES: CPT | Mod: GP | Performed by: PHYSICAL THERAPIST

## 2024-08-20 PROCEDURE — 250N000012 HC RX MED GY IP 250 OP 636 PS 637: Performed by: INTERNAL MEDICINE

## 2024-08-20 PROCEDURE — 250N000013 HC RX MED GY IP 250 OP 250 PS 637: Performed by: HOSPITALIST

## 2024-08-20 PROCEDURE — 99232 SBSQ HOSP IP/OBS MODERATE 35: CPT | Performed by: INTERNAL MEDICINE

## 2024-08-20 PROCEDURE — 120N000001 HC R&B MED SURG/OB

## 2024-08-20 PROCEDURE — 97530 THERAPEUTIC ACTIVITIES: CPT | Mod: GP | Performed by: PHYSICAL THERAPIST

## 2024-08-20 RX ORDER — FAMOTIDINE 20 MG/1
20 TABLET, FILM COATED ORAL EVERY EVENING
Status: DISCONTINUED | OUTPATIENT
Start: 2024-08-20 | End: 2024-08-20

## 2024-08-20 RX ADMIN — ACETAMINOPHEN 1000 MG: 500 TABLET, FILM COATED ORAL at 16:22

## 2024-08-20 RX ADMIN — AMLODIPINE BESYLATE 2.5 MG: 2.5 TABLET ORAL at 16:22

## 2024-08-20 RX ADMIN — INSULIN ASPART 2 UNITS: 100 INJECTION, SOLUTION INTRAVENOUS; SUBCUTANEOUS at 12:48

## 2024-08-20 RX ADMIN — INSULIN ASPART 1 UNITS: 100 INJECTION, SOLUTION INTRAVENOUS; SUBCUTANEOUS at 09:43

## 2024-08-20 RX ADMIN — LIDOCAINE 2 PATCH: 4 PATCH TOPICAL at 07:59

## 2024-08-20 RX ADMIN — FAMOTIDINE 20 MG: 20 TABLET ORAL at 19:59

## 2024-08-20 RX ADMIN — OXYCODONE HYDROCHLORIDE 2.5 MG: 5 TABLET ORAL at 13:20

## 2024-08-20 RX ADMIN — METHOCARBAMOL 500 MG: 500 TABLET ORAL at 21:31

## 2024-08-20 RX ADMIN — CARVEDILOL 12.5 MG: 6.25 TABLET, FILM COATED ORAL at 07:59

## 2024-08-20 RX ADMIN — ASPIRIN 81 MG: 81 TABLET, COATED ORAL at 12:48

## 2024-08-20 RX ADMIN — INSULIN ASPART 1 UNITS: 100 INJECTION, SOLUTION INTRAVENOUS; SUBCUTANEOUS at 17:19

## 2024-08-20 RX ADMIN — ACETAMINOPHEN 1000 MG: 500 TABLET, FILM COATED ORAL at 07:59

## 2024-08-20 RX ADMIN — HYDROMORPHONE HYDROCHLORIDE 0.4 MG: 0.2 INJECTION, SOLUTION INTRAMUSCULAR; INTRAVENOUS; SUBCUTANEOUS at 00:32

## 2024-08-20 RX ADMIN — ACETAMINOPHEN 1000 MG: 500 TABLET, FILM COATED ORAL at 12:48

## 2024-08-20 RX ADMIN — OXYCODONE HYDROCHLORIDE 5 MG: 5 TABLET ORAL at 21:31

## 2024-08-20 RX ADMIN — LEVOTHYROXINE SODIUM 112 MCG: 0.11 TABLET ORAL at 07:59

## 2024-08-20 RX ADMIN — CARVEDILOL 12.5 MG: 6.25 TABLET, FILM COATED ORAL at 18:56

## 2024-08-20 ASSESSMENT — ACTIVITIES OF DAILY LIVING (ADL)
ADLS_ACUITY_SCORE: 51
ADLS_ACUITY_SCORE: 58
ADLS_ACUITY_SCORE: 58
ADLS_ACUITY_SCORE: 52
ADLS_ACUITY_SCORE: 51
ADLS_ACUITY_SCORE: 51
ADLS_ACUITY_SCORE: 58
ADLS_ACUITY_SCORE: 54
ADLS_ACUITY_SCORE: 58
ADLS_ACUITY_SCORE: 51
ADLS_ACUITY_SCORE: 51
ADLS_ACUITY_SCORE: 58
ADLS_ACUITY_SCORE: 58
ADLS_ACUITY_SCORE: 51
ADLS_ACUITY_SCORE: 52
ADLS_ACUITY_SCORE: 58
ADLS_ACUITY_SCORE: 51
ADLS_ACUITY_SCORE: 58
ADLS_ACUITY_SCORE: 58
ADLS_ACUITY_SCORE: 54
ADLS_ACUITY_SCORE: 54

## 2024-08-20 NOTE — PROGRESS NOTES
[facility]  Hospitalist Progress Note  Ramirez Campos MD 08/20/2024    Reason for Stay (Diagnosis): LUE pain         Assessment and Plan:      Summary of Stay: Chasity Hodgson is a 78 year old female with a history of coronary artery disease, stage II chronic kidney disease, carotid artery stenosis, hypothyroidism, GERD, diabetes mellitus, depression, hyperlipidemia, ANNETTE, and PE who presents with left arm pain after twisting and putting on a blood pressure cuff    It is suspected the patient's pain is due to a left biceps tendinopathy.  Appreciate orthopedic input this admission.  Plan is for TCU on discharge, with the facility able to accept the patient on 8/21    Plans today:  -No significant changes made today  -Anticipate discharge to TCU tomorrow         Left arm pain, left biceps tendinopathy:  Patient presented with left arm pain after trying to put on a blood pressure cuff and twisting.  She has pain on the inner side of her elbow upper arm.  Imaging did not show any fracture nor DVT seen on ultrasound.  She had an MRI after being seen by ortho and does not have an evidence of a bicep tear but does have insertional left biceps tendinopathy.    At baseline she ambulates with a walker and currently this is not possible due to her arm injury.  Patiently to be seen by physical therapy to see if she is able to get around with a 1 arm walker as the patient lives at home alone.  Patient may require TCU if not safe to return home.     Patient's pain is currently being controlled with Tylenol, Atarax, Robaxin, oxycodone, and Dilaudid for breakthrough.  Patient's arm is in a sling.      Type 2 insulin-dependent diabetes mellitus:   Hold prior to admission Jardiance, prandial insulin, and Lantus. BS actually only  so we will only use a insulin correction scale at this time.     Need to make sure we prevent hypoglycemia so may not need to resume  her home medications at all and just watch her  at a TCU and add them back if needed     GERD:   Continue pantoprazole versus Pepcid.  Not sure what patient is taking. Per pharmacy  She has both listed, does not need this as well as carafrate. Will be just on pepcid for now.      ANNETTE:   Continue nocturnal CPAP.     Hypertension:   Blood pressure is reasonable.  Has Norvasc ordered as needed for blood pressures greater than 140.  She will continue on her home Coreg.  At this point holding her Lasix, Benicar.        Coronary disease:   Denies any chest pain.  Did undergo an angiogram in November 2023 showing nonobstructive CAD which was unchanged from 2018.  TTE during that time showed preserved EF.  Consider an EKG if she goes for operative intervention.  Continue prior to admission carvedilol and aspirin. Hold home ARB and lasix.      Hyperlipidemia:   Holding her home Zetia and Tricor.       Morbid obesity   Patient with a BMI of 40 which complicates all aspects of her cares and puts her at high risk for falls.     Hypothyroidism:   Continue levothyroxine.          DVT ppx:  mobilize  Code Status: Full Code    DISPO:  to TCU on 8/21        Interval History (Subjective):      No new concerns or complaints.  Left upper extremity pain is stable.  Left upper extremity is in a sling.  Discussed plans for TCU on 8/21 which the patient is agreeable to                  Physical Exam:      Last Vital Signs:  /42 (BP Location: Right arm)   Pulse 60   Temp (!) 96.1  F (35.6  C) (Temporal)   Resp 18   Ht 1.524 m (5')   Wt 93.4 kg (205 lb 14.6 oz)   LMP  (LMP Unknown)   SpO2 97%   BMI 40.21 kg/m        Intake/Output Summary (Last 24 hours) at 8/20/2024 1242  Last data filed at 8/20/2024 0853  Gross per 24 hour   Intake 240 ml   Output 800 ml   Net -560 ml       Constitutional: Awake, alert, cooperative, no apparent distress     Respiratory: Clear to auscultation bilaterally, no crackles or wheezing   Cardiovascular: Regular rate and rhythm, normal S1 and S2,  and no murmur noted   Abdomen: Normal bowel sounds, soft, non-distended, non-tender   Skin: No rashes, no cyanosis, dry to touch   Neuro: Alert and oriented x3, no weakness, numbness, memory loss   Extremities: No edema, normal range of motion   Other(s): Left hand warm.  Easily palpable left radial pulse.  Left fingers mobile and sensitive to touch.  Left upper extremity in a sling       All other systems: Negative          Medications:      All current medications were reviewed with changes reflected in problem list.         Data:      All new lab and imaging data was reviewed.   Labs:       Lab Results   Component Value Date     08/17/2024     07/03/2024     05/24/2024     07/07/2021     01/18/2021     11/04/2020    Lab Results   Component Value Date    CHLORIDE 106 08/17/2024    CHLORIDE 107 07/03/2024    CHLORIDE 106 05/24/2024    CHLORIDE 109 10/12/2022    CHLORIDE 105 08/01/2022    CHLORIDE 107 07/12/2022    CHLORIDE 107 07/07/2021    CHLORIDE 108 01/18/2021    CHLORIDE 107 11/04/2020    Lab Results   Component Value Date    BUN 39.4 08/17/2024    BUN 30.3 07/03/2024    BUN 33.8 05/24/2024    BUN 41 10/12/2022    BUN 39 08/01/2022    BUN 29 07/12/2022    BUN 27 07/07/2021    BUN 28 01/18/2021    BUN 30 11/04/2020      Lab Results   Component Value Date    POTASSIUM 4.5 08/17/2024    POTASSIUM 4.9 07/03/2024    POTASSIUM 4.6 05/24/2024    POTASSIUM 5.0 10/12/2022    POTASSIUM 4.8 08/01/2022    POTASSIUM 4.8 07/12/2022    POTASSIUM 4.7 07/07/2021    POTASSIUM 4.5 01/18/2021    POTASSIUM 4.7 11/30/2020    Lab Results   Component Value Date    CO2 21 08/17/2024    CO2 24 07/03/2024    CO2 24 05/24/2024    CO2 23 10/12/2022    CO2 28 08/01/2022    CO2 28 07/12/2022    CO2 24 07/07/2021    CO2 25 01/18/2021    CO2 27 11/04/2020    Lab Results   Component Value Date    CR 1.52 08/17/2024    CR 1.24 07/03/2024    CR 1.50 05/24/2024    CR 1.01 07/07/2021    CR 1.03 01/18/2021    CR  1.03 11/30/2020        Recent Labs   Lab 08/17/24  2142   WBC 7.4   HGB 10.9*   HCT 33.4*   MCV 93         Imaging:   No results found for this or any previous visit (from the past 24 hour(s)).

## 2024-08-20 NOTE — PLAN OF CARE
"Goal Outcome Evaluation:      Plan of Care Reviewed With: patient    Overall Patient Progress: improving    Outcome Evaluation: A/Ox4; shoulder pain rated 10, IV dil given x1; colostomy bag; voiding good amts; d/c [facility] 8/21      Problem: Adult Inpatient Plan of Care  Goal: Plan of Care Review  Description: The Plan of Care Review/Shift note should be completed every shift.  The Outcome Evaluation is a brief statement about your assessment that the patient is improving, declining, or no change.  This information will be displayed automatically on your shift  note.  Outcome: Progressing  Flowsheets (Taken 8/20/2024 0445)  Outcome Evaluation:   A/Ox4   shoulder pain rated 10, IV dil given x1   colostomy bag   voiding good amts   d/c [facility] 8/21  Plan of Care Reviewed With: patient  Overall Patient Progress: improving  Goal: Patient-Specific Goal (Individualized)  Description: You can add care plan individualizations to a care plan. Examples of Individualization might be:  \"Parent requests to be called daily at 9am for status\", \"I have a hard time hearing out of my right ear\", or \"Do not touch me to wake me up as it startles  me\".  Outcome: Progressing  Goal: Absence of Hospital-Acquired Illness or Injury  Outcome: Progressing  Intervention: Identify and Manage Fall Risk  Recent Flowsheet Documentation  Taken 8/20/2024 0032 by Ac Bowling, RN  Safety Promotion/Fall Prevention:   safety round/check completed   nonskid shoes/slippers when out of bed   lighting adjusted   clutter free environment maintained   activity supervised  Intervention: Prevent Skin Injury  Recent Flowsheet Documentation  Taken 8/20/2024 0032 by Ac Bowling, RN  Body Position: supine, head elevated  Intervention: Prevent Infection  Recent Flowsheet Documentation  Taken 8/19/2024 1956 by Ac Bowling, RN  Infection Prevention:   single patient room provided   rest/sleep promoted   personal protective equipment utilized  Goal: " Optimal Comfort and Wellbeing  Outcome: Progressing  Intervention: Monitor Pain and Promote Comfort  Recent Flowsheet Documentation  Taken 8/20/2024 0032 by Ac Bowling RN  Pain Management Interventions: medication (see MAR)  Goal: Readiness for Transition of Care  Outcome: Progressing     Problem: Pain Acute  Goal: Optimal Pain Control and Function  Outcome: Progressing  Intervention: Develop Pain Management Plan  Recent Flowsheet Documentation  Taken 8/20/2024 0032 by Ac Bowling RN  Pain Management Interventions: medication (see MAR)  Intervention: Prevent or Manage Pain  Recent Flowsheet Documentation  Taken 8/20/2024 0032 by Ac Bowling RN  Medication Review/Management: medications reviewed     Problem: Diabetes  Goal: Optimal Coping  Outcome: Progressing  Goal: Optimal Functional Ability  Outcome: Progressing  Intervention: Optimize Functional Ability  Recent Flowsheet Documentation  Taken 8/20/2024 0032 by Ac Bowling RN  Assistive Device Utilized: gait belt  Activity Management: activity adjusted per tolerance  Activity Assistance Provided: assistance, 1 person  Goal: Blood Glucose Level Within Target Range  Outcome: Progressing  Goal: Minimize Risk of Hypoglycemia  Outcome: Progressing     Problem: Comorbidity Management  Goal: Blood Glucose Levels Within Targeted Range  Outcome: Progressing  Intervention: Monitor and Manage Glycemia  Recent Flowsheet Documentation  Taken 8/20/2024 0032 by Ac Bowling RN  Medication Review/Management: medications reviewed  Goal: Blood Pressure in Desired Range  Outcome: Progressing  Intervention: Maintain Blood Pressure Management  Recent Flowsheet Documentation  Taken 8/20/2024 0032 by Ac Bowling RN  Medication Review/Management: medications reviewed

## 2024-08-20 NOTE — PROGRESS NOTES
Care Management Follow Up    Length of Stay (days): 2    Expected Discharge Date: 08/21/2024     Concerns to be Addressed: discharge planning     Patient plan of care discussed at interdisciplinary rounds: Yes    Anticipated Discharge Disposition: Transitional Care    Patient/family educated on Medicare website which has current facility and service quality ratings: yes  Education Provided on the Discharge Plan:    Patient/Family in Agreement with the Plan: yes    Referrals Placed by CM/SW: Post Acute Facilities  Private pay costs discussed: Not applicable    Additional Information:  Pt states she will have a friend provide transportation to [facility] 8/21/2024 around 1130. Pt states she has extra ostomy pouches in her possession.   Will continue to follow for discharge planning.    Sharon Lewis RN   Inpatient Care Coordination  [facility]   Phone: [phone]

## 2024-08-20 NOTE — PLAN OF CARE
"Goal Outcome Evaluation:        Plan of Care Reviewed With: patient, child    Overall Patient Progress: improving    Outcome Evaluation: Pt's SBP elevated, 140s-160s. PRN amlodipine administered. Report mild pain at rest, but states pain is increased with movement. PRN oxycodone and schedule Tylenol for pain management.    Up x1 assist, GB to the bathroom. Lungs clear throughout all lobes. Audible bowel sounds. Denied CP, SOB, n/v. No BM in colostomy bag. Bag intact. Voided. Edema in all extremities.       Problem: Adult Inpatient Plan of Care  Goal: Plan of Care Review  Description: The Plan of Care Review/Shift note should be completed every shift.  The Outcome Evaluation is a brief statement about your assessment that the patient is improving, declining, or no change.  This information will be displayed automatically on your shift  note.  Outcome: Progressing  Flowsheets (Taken 8/19/2024 2217)  Outcome Evaluation: Pt's SBP elevated, 140s-160s. Report mild pain at rest, but states pain is increased with movement. PRN oxycodone and schedule Tylenol for pain management.  Plan of Care Reviewed With:   patient   child  Overall Patient Progress: improving  Goal: Patient-Specific Goal (Individualized)  Description: You can add care plan individualizations to a care plan. Examples of Individualization might be:  \"Parent requests to be called daily at 9am for status\", \"I have a hard time hearing out of my right ear\", or \"Do not touch me to wake me up as it startles  me\".  Outcome: Progressing  Goal: Absence of Hospital-Acquired Illness or Injury  Outcome: Progressing  Intervention: Identify and Manage Fall Risk  Recent Flowsheet Documentation  Taken 8/19/2024 1900 by Marielos Carrera, RN  Safety Promotion/Fall Prevention:   activity supervised   clutter free environment maintained   mobility aid in reach   nonskid shoes/slippers when out of bed   room near nurse's station   supervised activity   toileting " scheduled  Intervention: Prevent Skin Injury  Recent Flowsheet Documentation  Taken 8/19/2024 1900 by Marielos Carrera RN  Body Position: weight shifting  Intervention: Prevent and Manage VTE (Venous Thromboembolism) Risk  Recent Flowsheet Documentation  Taken 8/19/2024 1900 by Marielos Carrera RN  VTE Prevention/Management: compression stockings on  Intervention: Prevent Infection  Recent Flowsheet Documentation  Taken 8/19/2024 1900 by Marielos Carrera RN  Infection Prevention: personal protective equipment utilized  Goal: Optimal Comfort and Wellbeing  Outcome: Progressing  Goal: Readiness for Transition of Care  Outcome: Progressing     Problem: Pain Acute  Goal: Optimal Pain Control and Function  Outcome: Progressing  Intervention: Prevent or Manage Pain  Recent Flowsheet Documentation  Taken 8/19/2024 1900 by Marielos Carrera RN  Bowel Elimination Promotion: ambulation promoted  Medication Review/Management: medications reviewed     Problem: Diabetes  Goal: Optimal Coping  Outcome: Progressing  Goal: Optimal Functional Ability  Outcome: Progressing  Intervention: Optimize Functional Ability  Recent Flowsheet Documentation  Taken 8/19/2024 1900 by Marielos Carrera RN  Assistive Device Utilized:   gait belt   walker  Activity Management: ambulated to bathroom  Activity Assistance Provided: assistance, 1 person  Goal: Blood Glucose Level Within Target Range  Outcome: Progressing  Intervention: Optimize Glycemic Control  Recent Flowsheet Documentation  Taken 8/19/2024 1900 by Marieols Carrera RN  Hyperglycemia Management: blood glucose monitored  Goal: Minimize Risk of Hypoglycemia  Outcome: Progressing  Intervention: Management and Risk Reduction of Hypoglycemia  Recent Flowsheet Documentation  Taken 8/19/2024 1900 by Marielos Carrera RN  Hypoglycemia Management: blood glucose monitored     Problem: Comorbidity Management  Goal: Blood Glucose Levels Within Targeted Range  Outcome: Progressing  Intervention: Monitor and  Manage Glycemia  Recent Flowsheet Documentation  Taken 8/19/2024 1900 by Marielos Carrera, RN  Glycemic Management: blood glucose monitored  Medication Review/Management: medications reviewed  Goal: Blood Pressure in Desired Range  Outcome: Progressing  Intervention: Maintain Blood Pressure Management  Recent Flowsheet Documentation  Taken 8/19/2024 1900 by Marielos Carrera, RN  Medication Review/Management: medications reviewed

## 2024-08-20 NOTE — PLAN OF CARE
"[id number]   Patient alert and oriented x4. Pleasant and cooperative. Blood pressure elevated before scheduled morning meds. On recheck diastolic BP low provider notified. 1200 vitals stable on room air, BP within parameters. Pain during shift controlled with scheduled Tylenol and PRN oxycodone. Ostomy present and draining, small output. Voiding spontaneously in toilet with no difficulties. Moderate carb diet tolerated, ate breakfast and lunch. Patient up with 1 GB and walker. Plan is to discharge to her TCU tomorrow with transport from a friend.     Goal Outcome Evaluation:      Plan of Care Reviewed With: patient    Overall Patient Progress: no changeOverall Patient Progress: no change    Problem: Adult Inpatient Plan of Care  Goal: Plan of Care Review  Description: The Plan of Care Review/Shift note should be completed every shift.  The Outcome Evaluation is a brief statement about your assessment that the patient is improving, declining, or no change.  This information will be displayed automatically on your shift  note.  Outcome: Progressing  Flowsheets (Taken 8/20/2024 7475)  Plan of Care Reviewed With: patient  Overall Patient Progress: no change  Goal: Patient-Specific Goal (Individualized)  Description: You can add care plan individualizations to a care plan. Examples of Individualization might be:  \"Parent requests to be called daily at 9am for status\", \"I have a hard time hearing out of my right ear\", or \"Do not touch me to wake me up as it startles  me\".  Outcome: Progressing  Goal: Absence of Hospital-Acquired Illness or Injury  Outcome: Progressing  Intervention: Identify and Manage Fall Risk  Recent Flowsheet Documentation  Taken 8/20/2024 2006 by Beryl Crane RN  Safety Promotion/Fall Prevention:   activity supervised   assistive device/personal items within reach   clutter free environment maintained   increase visualization of patient   mobility aid in reach   nonskid shoes/slippers when out of " bed   patient and family education   room near nurse's station   room organization consistent   safety round/check completed   supervised activity  Intervention: Prevent Skin Injury  Recent Flowsheet Documentation  Taken 8/20/2024 0855 by Beryl Crane RN  Skin Protection: adhesive use limited  Device Skin Pressure Protection:   absorbent pad utilized/changed   adhesive use limited   tubing/devices free from skin contact  Intervention: Prevent and Manage VTE (Venous Thromboembolism) Risk  Recent Flowsheet Documentation  Taken 8/20/2024 0855 by Beryl Crane RN  VTE Prevention/Management: (patient up in chair) SCDs off (sequential compression devices)  Intervention: Prevent Infection  Recent Flowsheet Documentation  Taken 8/20/2024 0855 by Beryl Crane RN  Infection Prevention:   single patient room provided   rest/sleep promoted   personal protective equipment utilized   hand hygiene promoted  Goal: Optimal Comfort and Wellbeing  Outcome: Progressing  Intervention: Monitor Pain and Promote Comfort  Recent Flowsheet Documentation  Taken 8/20/2024 1248 by Beryl Crane RN  Pain Management Interventions: medication (see MAR)  Taken 8/20/2024 0759 by Beryl Crane RN  Pain Management Interventions: medication (see MAR)  Goal: Readiness for Transition of Care  Outcome: Progressing     Problem: Pain Acute  Goal: Optimal Pain Control and Function  Outcome: Progressing  Intervention: Develop Pain Management Plan  Recent Flowsheet Documentation  Taken 8/20/2024 1248 by Beryl Crane RN  Pain Management Interventions: medication (see MAR)  Taken 8/20/2024 0759 by Beryl Crane RN  Pain Management Interventions: medication (see MAR)  Intervention: Prevent or Manage Pain  Recent Flowsheet Documentation  Taken 8/20/2024 0855 by Beryl Crane RN  Bowel Elimination Promotion:   adequate fluid intake promoted   ambulation promoted  Medication Review/Management: medications reviewed  Intervention: Optimize Psychosocial  Wellbeing  Recent Flowsheet Documentation  Taken 8/20/2024 0855 by Beryl Crane RN  Supportive Measures:   active listening utilized   decision-making supported   self-care encouraged     Problem: Diabetes  Goal: Optimal Coping  Outcome: Progressing  Intervention: Support Wellbeing and Self-Management Success  Recent Flowsheet Documentation  Taken 8/20/2024 0855 by Beryl Crane RN  Supportive Measures:   active listening utilized   decision-making supported   self-care encouraged  Goal: Optimal Functional Ability  Outcome: Progressing  Intervention: Optimize Functional Ability  Recent Flowsheet Documentation  Taken 8/20/2024 0853 by Beryl Crane, RN  Assistive Device Utilized:   walker   gait belt  Activity Management:   up in chair   ambulated to bathroom  Activity Assistance Provided: assistance, 1 person  Goal: Blood Glucose Level Within Target Range  Outcome: Progressing  Intervention: Optimize Glycemic Control  Recent Flowsheet Documentation  Taken 8/20/2024 0855 by Beryl Crane RN  Hyperglycemia Management: blood glucose monitored  Goal: Minimize Risk of Hypoglycemia  Outcome: Progressing  Intervention: Management and Risk Reduction of Hypoglycemia  Recent Flowsheet Documentation  Taken 8/20/2024 0855 by Beryl Crane RN  Hypoglycemia Management: blood glucose monitored     Problem: Comorbidity Management  Goal: Blood Glucose Levels Within Targeted Range  Outcome: Progressing  Intervention: Monitor and Manage Glycemia  Recent Flowsheet Documentation  Taken 8/20/2024 0855 by Beryl Crane RN  Glycemic Management: blood glucose monitored  Medication Review/Management: medications reviewed  Goal: Blood Pressure in Desired Range  Outcome: Progressing  Intervention: Maintain Blood Pressure Management  Recent Flowsheet Documentation  Taken 8/20/2024 0855 by Beryl Crane, RN  Medication Review/Management: medications reviewed

## 2024-08-21 ENCOUNTER — PATIENT OUTREACH (OUTPATIENT)
Dept: CARE COORDINATION | Facility: CLINIC | Age: 79
End: 2024-08-21
Payer: MEDICARE

## 2024-08-21 VITALS
RESPIRATION RATE: 16 BRPM | BODY MASS INDEX: 40.43 KG/M2 | HEART RATE: 63 BPM | TEMPERATURE: 97.8 F | DIASTOLIC BLOOD PRESSURE: 60 MMHG | SYSTOLIC BLOOD PRESSURE: 180 MMHG | HEIGHT: 60 IN | OXYGEN SATURATION: 99 % | WEIGHT: 205.91 LBS

## 2024-08-21 LAB
GLUCOSE BLDC GLUCOMTR-MCNC: 159 MG/DL (ref 70–99)
GLUCOSE BLDC GLUCOMTR-MCNC: 199 MG/DL (ref 70–99)

## 2024-08-21 PROCEDURE — 250N000013 HC RX MED GY IP 250 OP 250 PS 637: Performed by: HOSPITALIST

## 2024-08-21 PROCEDURE — 250N000013 HC RX MED GY IP 250 OP 250 PS 637: Performed by: INTERNAL MEDICINE

## 2024-08-21 PROCEDURE — 250N000012 HC RX MED GY IP 250 OP 636 PS 637: Performed by: INTERNAL MEDICINE

## 2024-08-21 PROCEDURE — 99238 HOSP IP/OBS DSCHRG MGMT 30/<: CPT | Performed by: INTERNAL MEDICINE

## 2024-08-21 PROCEDURE — 999N000111 HC STATISTIC OT IP EVAL DEFER

## 2024-08-21 RX ORDER — AMOXICILLIN 250 MG
1 CAPSULE ORAL DAILY
DISCHARGE
Start: 2024-08-21

## 2024-08-21 RX ORDER — FAMOTIDINE 20 MG/1
20 TABLET, FILM COATED ORAL EVERY EVENING
DISCHARGE
Start: 2024-08-21

## 2024-08-21 RX ORDER — METHOCARBAMOL 500 MG/1
500-1000 TABLET, FILM COATED ORAL 4 TIMES DAILY PRN
Qty: 10 TABLET | Refills: 0 | Status: SHIPPED | OUTPATIENT
Start: 2024-08-21

## 2024-08-21 RX ORDER — POLYETHYLENE GLYCOL 3350 17 G/17G
17 POWDER, FOR SOLUTION ORAL 2 TIMES DAILY PRN
DISCHARGE
Start: 2024-08-21

## 2024-08-21 RX ORDER — OXYCODONE HYDROCHLORIDE 5 MG/1
5 TABLET ORAL EVERY 4 HOURS PRN
Qty: 10 TABLET | Refills: 0 | Status: SHIPPED | DISCHARGE
Start: 2024-08-21

## 2024-08-21 RX ORDER — INSULIN GLARGINE 100 [IU]/ML
5 INJECTION, SOLUTION SUBCUTANEOUS DAILY
DISCHARGE
Start: 2024-08-21

## 2024-08-21 RX ADMIN — LEVOTHYROXINE SODIUM 112 MCG: 0.11 TABLET ORAL at 08:30

## 2024-08-21 RX ADMIN — ASPIRIN 81 MG: 81 TABLET, COATED ORAL at 11:05

## 2024-08-21 RX ADMIN — AMLODIPINE BESYLATE 2.5 MG: 2.5 TABLET ORAL at 10:02

## 2024-08-21 RX ADMIN — ACETAMINOPHEN 1000 MG: 500 TABLET, FILM COATED ORAL at 06:36

## 2024-08-21 RX ADMIN — CARVEDILOL 12.5 MG: 6.25 TABLET, FILM COATED ORAL at 08:30

## 2024-08-21 RX ADMIN — LIDOCAINE 2 PATCH: 4 PATCH TOPICAL at 08:30

## 2024-08-21 RX ADMIN — INSULIN GLARGINE 5 UNITS: 100 INJECTION, SOLUTION SUBCUTANEOUS at 11:05

## 2024-08-21 RX ADMIN — INSULIN ASPART 1 UNITS: 100 INJECTION, SOLUTION INTRAVENOUS; SUBCUTANEOUS at 08:31

## 2024-08-21 RX ADMIN — OXYCODONE HYDROCHLORIDE 2.5 MG: 5 TABLET ORAL at 08:56

## 2024-08-21 RX ADMIN — ACETAMINOPHEN 1000 MG: 500 TABLET, FILM COATED ORAL at 00:23

## 2024-08-21 ASSESSMENT — ACTIVITIES OF DAILY LIVING (ADL)
ADLS_ACUITY_SCORE: 51

## 2024-08-21 NOTE — LETTER
[facility]   To:   Angely JENKINS SW          Please give to facility    From:   Kaelyn Hennessy  [facility]  Care Coordinator   [facility]   P: [phone]   [email or web address]   Patient Name:  Chasity Hodgson YOB: 1945   Admit date: 8/21/24      *Information Needed:  Please contact me when the patient will discharge (or if they will move to long term care)- include the discharge date, disposition, and main diagnosis   If the patient is discharged with home care services, please provide the name of the agency    Also- Please inform me if a care conference is being held.   Phone, Fax or Email with information                              Thank you

## 2024-08-21 NOTE — PLAN OF CARE
[id number]     Patient alert and oriented x4. Pleasant and cooperative during shift. Voiced some frustrations about her children bringing her the wrong ostomy supplies. BP elevated scheduled medications given and PRN BP medication given. Patient up to the bathroom frequently and worried about discharge. All other vitals stable on room air. PRN oxycodone given for pain with some relief noted by patient. CMS intact, bibiana skin with edema to all 4 extremities. Sling in place on LUE. Up with 1 assist GB and walker. Patient discharging today to [facility] with transport from a friend. Discharge instructions reviewed with patient no questions at this time. Packet sent with patient with scripts. Patient discharge around 1130.     Goal Outcome Evaluation:      Plan of Care Reviewed With: patient    Overall Patient Progress: no changeOverall Patient Progress: no change    Problem: Adult Inpatient Plan of Care  Goal: Absence of Hospital-Acquired Illness or Injury  Intervention: Identify and Manage Fall Risk  Recent Flowsheet Documentation  Taken 8/21/2024 0840 by Beryl Crane RN  Safety Promotion/Fall Prevention:   activity supervised   assistive device/personal items within reach   clutter free environment maintained   increase visualization of patient   mobility aid in reach   nonskid shoes/slippers when out of bed   patient and family education   room near nurse's station   room organization consistent   safety round/check completed   supervised activity  Intervention: Prevent Skin Injury  Recent Flowsheet Documentation  Taken 8/21/2024 0840 by Beryl Crane RN  Skin Protection: adhesive use limited  Device Skin Pressure Protection:   absorbent pad utilized/changed   adhesive use limited  Intervention: Prevent and Manage VTE (Venous Thromboembolism) Risk  Recent Flowsheet Documentation  Taken 8/21/2024 0840 by Beryl Crane RN  VTE Prevention/Management: (patient up in chair)   SCDs off (sequential compression  devices)   other (see comments)  Intervention: Prevent Infection  Recent Flowsheet Documentation  Taken 8/21/2024 0840 by Beryl Crane RN  Infection Prevention:   single patient room provided   rest/sleep promoted   personal protective equipment utilized   hand hygiene promoted  Goal: Optimal Comfort and Wellbeing  Intervention: Monitor Pain and Promote Comfort  Recent Flowsheet Documentation  Taken 8/21/2024 0950 by Beryl Crane RN  Pain Management Interventions: repositioned  Taken 8/21/2024 0856 by Beryl Crane RN  Pain Management Interventions:   medication (see MAR)   repositioned     Problem: Pain Acute  Goal: Optimal Pain Control and Function  Intervention: Develop Pain Management Plan  Recent Flowsheet Documentation  Taken 8/21/2024 0950 by Beryl Crane RN  Pain Management Interventions: repositioned  Taken 8/21/2024 0856 by Beryl Crane RN  Pain Management Interventions:   medication (see MAR)   repositioned  Intervention: Prevent or Manage Pain  Recent Flowsheet Documentation  Taken 8/21/2024 0840 by Beryl Crane RN  Bowel Elimination Promotion:   adequate fluid intake promoted   ambulation promoted  Intervention: Optimize Psychosocial Wellbeing  Recent Flowsheet Documentation  Taken 8/21/2024 0840 by Beryl Crane RN  Supportive Measures:   active listening utilized   relaxation techniques promoted   self-care encouraged   verbalization of feelings encouraged     Problem: Diabetes  Goal: Optimal Coping  Intervention: Support Wellbeing and Self-Management Success  Recent Flowsheet Documentation  Taken 8/21/2024 0840 by Beryl Crane RN  Supportive Measures:   active listening utilized   relaxation techniques promoted   self-care encouraged   verbalization of feelings encouraged  Goal: Optimal Functional Ability  Intervention: Optimize Functional Ability  Recent Flowsheet Documentation  Taken 8/21/2024 1115 by Beryl Crane RN  Assistive Device Utilized:   gait belt   walker  Activity  Management:   ambulated to bathroom   up in chair  Activity Assistance Provided: assistance, 1 person  Taken 8/21/2024 0830 by Beryl Crane, RN  Assistive Device Utilized:   gait belt   walker  Activity Management:   ambulated to bathroom   up in chair  Activity Assistance Provided: assistance, 1 person  Goal: Blood Glucose Level Within Target Range  Intervention: Optimize Glycemic Control  Recent Flowsheet Documentation  Taken 8/21/2024 0840 by Beryl Crane, RN  Hyperglycemia Management: blood glucose monitored  Goal: Minimize Risk of Hypoglycemia  Intervention: Management and Risk Reduction of Hypoglycemia  Recent Flowsheet Documentation  Taken 8/21/2024 0840 by Beryl Crane, RN  Hypoglycemia Management: blood glucose monitored     Problem: Comorbidity Management  Goal: Blood Glucose Levels Within Targeted Range  Intervention: Monitor and Manage Glycemia  Recent Flowsheet Documentation  Taken 8/21/2024 0840 by Beryl Crane, RN  Glycemic Management: blood glucose monitored

## 2024-08-21 NOTE — PROGRESS NOTES
Clinic Care Coordination Contact  Care Coordination Transition Communication    Clinical Data: Patient was hospitalized at [facility] from 8/18/24 to 8/21/24 with diagnosis of Left arm pain, left biceps tendinopathy.     Assessment: Patient has transitioned to TCU/ARU for short term rehabilitation:    Facility Name: [facility]  Transition Communication:  Notified facility of Primary Care- Care Coordination support via Epic fax.    Plan: Care Coordinator will await notification from facility staff informing of patient's discharge plans/needs. Care Coordinator will review chart and outreach to facility staff every 4 weeks and as needed.     Kaelyn Hennessy,  [facility]  Clinic Care Coordinator  [facility]  [phone]  [email or web address]

## 2024-08-21 NOTE — PLAN OF CARE
"Goal Outcome Evaluation:                 Outcome Evaluation: A/Ox4.  Scheduled Tylenol given for shoulder pain along with ice.  LUE in sling. Colostomy with good output. Up with Ax1 et gait belt/walker.  Plans to discharge to TCU tomorrow at 11:30-friend to transport.      Problem: Adult Inpatient Plan of Care  Goal: Plan of Care Review  Description: The Plan of Care Review/Shift note should be completed every shift.  The Outcome Evaluation is a brief statement about your assessment that the patient is improving, declining, or no change.  This information will be displayed automatically on your shift  note.  Outcome: Progressing  Flowsheets (Taken 8/20/2024 1937)  Outcome Evaluation: A/Ox4.  Scheduled Tylenol given for shoulder pain along with ice.  LUE in sling. Colostomy with good output. Up with Ax1 et gait belt/walker.  Plans to discharge to TCU tomorrow at 11:30-friend to transport.  Goal: Patient-Specific Goal (Individualized)  Description: You can add care plan individualizations to a care plan. Examples of Individualization might be:  \"Parent requests to be called daily at 9am for status\", \"I have a hard time hearing out of my right ear\", or \"Do not touch me to wake me up as it startles  me\".  Outcome: Progressing  Goal: Absence of Hospital-Acquired Illness or Injury  Outcome: Progressing  Intervention: Identify and Manage Fall Risk  Recent Flowsheet Documentation  Taken 8/20/2024 1622 by Yadira Velez RN  Safety Promotion/Fall Prevention:   activity supervised   assistive device/personal items within reach   clutter free environment maintained   increase visualization of patient   mobility aid in reach   nonskid shoes/slippers when out of bed   patient and family education   room near nurse's station   room organization consistent   safety round/check completed   supervised activity  Intervention: Prevent Skin Injury  Recent Flowsheet Documentation  Taken 8/20/2024 1900 by Yadira Velez, RN  Body " Position:   supine, head elevated   upper extremity elevated  Taken 8/20/2024 1622 by Yadira Velez RN  Skin Protection: adhesive use limited  Device Skin Pressure Protection:   absorbent pad utilized/changed   adhesive use limited   tubing/devices free from skin contact  Intervention: Prevent Infection  Recent Flowsheet Documentation  Taken 8/20/2024 1622 by Yadira Velez RN  Infection Prevention:   single patient room provided   rest/sleep promoted   personal protective equipment utilized   hand hygiene promoted  Goal: Optimal Comfort and Wellbeing  Outcome: Progressing  Intervention: Monitor Pain and Promote Comfort  Recent Flowsheet Documentation  Taken 8/20/2024 1622 by Yadira Velez RN  Pain Management Interventions:   medication (see MAR)   repositioned   cold applied  Goal: Readiness for Transition of Care  Outcome: Progressing     Problem: Pain Acute  Goal: Optimal Pain Control and Function  Outcome: Progressing  Intervention: Develop Pain Management Plan  Recent Flowsheet Documentation  Taken 8/20/2024 1622 by Yadira Velez RN  Pain Management Interventions:   medication (see MAR)   repositioned   cold applied  Intervention: Prevent or Manage Pain  Recent Flowsheet Documentation  Taken 8/20/2024 1622 by Yadira Velez RN  Bowel Elimination Promotion:   adequate fluid intake promoted   ambulation promoted  Medication Review/Management: medications reviewed     Problem: Diabetes  Goal: Optimal Coping  Outcome: Progressing  Goal: Optimal Functional Ability  Outcome: Progressing  Intervention: Optimize Functional Ability  Recent Flowsheet Documentation  Taken 8/20/2024 1900 by Yadira Velez RN  Assistive Device Utilized:   walker   gait belt  Activity Management:   ambulated in room   back to bed  Activity Assistance Provided: assistance, stand-by  Taken 8/20/2024 1622 by Yadira Velez RN  Assistive Device Utilized:   walker   gait belt  Activity Management:   ambulated to  bathroom   up in chair  Activity Assistance Provided: assistance, stand-by  Goal: Blood Glucose Level Within Target Range  Outcome: Progressing  Intervention: Optimize Glycemic Control  Recent Flowsheet Documentation  Taken 8/20/2024 1622 by Yadira Velez RN  Hyperglycemia Management: blood glucose monitored  Goal: Minimize Risk of Hypoglycemia  Outcome: Progressing  Intervention: Management and Risk Reduction of Hypoglycemia  Recent Flowsheet Documentation  Taken 8/20/2024 1622 by Yadira Velez RN  Hypoglycemia Management: blood glucose monitored     Problem: Comorbidity Management  Goal: Blood Glucose Levels Within Targeted Range  Outcome: Progressing  Intervention: Monitor and Manage Glycemia  Recent Flowsheet Documentation  Taken 8/20/2024 1622 by Yadira Velez RN  Glycemic Management: blood glucose monitored  Medication Review/Management: medications reviewed  Goal: Blood Pressure in Desired Range  Outcome: Progressing  Intervention: Maintain Blood Pressure Management  Recent Flowsheet Documentation  Taken 8/20/2024 1622 by Yadira Velez RN  Medication Review/Management: medications reviewed

## 2024-08-21 NOTE — PLAN OF CARE
"Goal Outcome Evaluation:      Plan of Care Reviewed With: patient    Overall Patient Progress: improvingOverall Patient Progress: improving    Outcome Evaluation: Pt AOx4. Pain managed with tylenol and oxycodone. LUE sling maintained. [facility]    Problem: Adult Inpatient Plan of Care  Goal: Plan of Care Review  Description: The Plan of Care Review/Shift note should be completed every shift.  The Outcome Evaluation is a brief statement about your assessment that the patient is improving, declining, or no change.  This information will be displayed automatically on your shift  note.  Outcome: Progressing  Flowsheets (Taken 8/21/2024 0733)  Outcome Evaluation: Pt AOx4. Pain managed with tylenol and oxycodone. LUE sling maintained. [facility].  Plan of Care Reviewed With: patient  Overall Patient Progress: improving  Goal: Patient-Specific Goal (Individualized)  Description: You can add care plan individualizations to a care plan. Examples of Individualization might be:  \"Parent requests to be called daily at 9am for status\", \"I have a hard time hearing out of my right ear\", or \"Do not touch me to wake me up as it startles  me\".  Outcome: Progressing  Goal: Absence of Hospital-Acquired Illness or Injury  Outcome: Progressing  Intervention: Identify and Manage Fall Risk  Recent Flowsheet Documentation  Taken 8/20/2024 2000 by Sandra Murdock, RN  Safety Promotion/Fall Prevention:   activity supervised   assistive device/personal items within reach   clutter free environment maintained   mobility aid in reach   nonskid shoes/slippers when out of bed   room near nurse's station   safety round/check completed  Intervention: Prevent Skin Injury  Recent Flowsheet Documentation  Taken 8/20/2024 2000 by Sandra Murdock, RN  Body Position:   position changed independently   supine, head elevated  Skin Protection: adhesive use limited  Device " Skin Pressure Protection:   absorbent pad utilized/changed   adhesive use limited  Intervention: Prevent Infection  Recent Flowsheet Documentation  Taken 8/20/2024 2000 by Sandra Murdock RN  Infection Prevention:   single patient room provided   rest/sleep promoted   hand hygiene promoted  Goal: Optimal Comfort and Wellbeing  Outcome: Progressing  Goal: Readiness for Transition of Care  Outcome: Progressing     Problem: Pain Acute  Goal: Optimal Pain Control and Function  Outcome: Progressing  Intervention: Prevent or Manage Pain  Recent Flowsheet Documentation  Taken 8/20/2024 2000 by Sandra Murdock RN  Medication Review/Management: medications reviewed  Intervention: Optimize Psychosocial Wellbeing  Recent Flowsheet Documentation  Taken 8/20/2024 2000 by Sandra Murdock RN  Supportive Measures:   active listening utilized   relaxation techniques promoted   self-care encouraged   verbalization of feelings encouraged     Problem: Diabetes  Goal: Optimal Coping  Outcome: Progressing  Intervention: Support Wellbeing and Self-Management Success  Recent Flowsheet Documentation  Taken 8/20/2024 2000 by Sandra Murdock RN  Supportive Measures:   active listening utilized   relaxation techniques promoted   self-care encouraged   verbalization of feelings encouraged  Goal: Optimal Functional Ability  Outcome: Progressing  Intervention: Optimize Functional Ability  Recent Flowsheet Documentation  Taken 8/20/2024 2000 by Sandra Murdock RN  Self-Care Promotion:   independence encouraged   BADL personal objects within reach  Goal: Blood Glucose Level Within Target Range  Outcome: Progressing  Intervention: Optimize Glycemic Control  Recent Flowsheet Documentation  Taken 8/20/2024 2000 by Sandra Murdock RN  Hyperglycemia Management: blood glucose monitored  Goal: Minimize Risk of Hypoglycemia  Outcome: Progressing  Intervention: Management and Risk Reduction of  Hypoglycemia  Recent Flowsheet Documentation  Taken 8/20/2024 2000 by Sandra Murdock RN  Hypoglycemia Management: blood glucose monitored     Problem: Comorbidity Management  Goal: Blood Glucose Levels Within Targeted Range  Outcome: Progressing  Intervention: Monitor and Manage Glycemia  Recent Flowsheet Documentation  Taken 8/20/2024 2000 by Sandra Murdock, RN  Glycemic Management: blood glucose monitored  Medication Review/Management: medications reviewed  Goal: Blood Pressure in Desired Range  Outcome: Progressing  Intervention: Maintain Blood Pressure Management  Recent Flowsheet Documentation  Taken 8/20/2024 2000 by Sandra Murdock RN  Medication Review/Management: medications reviewed

## 2024-08-21 NOTE — PROGRESS NOTES
Care Management Discharge Note    Discharge Date: 08/21/2024       Discharge Disposition: Transitional Care    Discharge Services:      Discharge DME:      Discharge Transportation:  (TBD)    Private pay costs discussed: Not applicable    Does the patient's insurance plan have a 3 day qualifying hospital stay waiver?  Yes     Which insurance plan 3 day waiver is available? ACO REACH    Will the waiver be used for post-acute placement? Yes    PAS Confirmation Code: [id number]  Patient/family educated on Medicare website which has current facility and service quality ratings: yes    Education Provided on the Discharge Plan:  no  Persons Notified of Discharge Plans: TCU, Patient, NST, RN  Patient/Family in Agreement with the Plan: yes    Handoff Referral Completed: Yes    Additional Information:  Patient discharging today at 1130 to [facility] TCU at 1130 via family.  Orders were faxed to TCU at [phone]. RN, [patient, NST, TCU all informed.    Mireya Elmore, RN, BSN, CM  Inpatient Care Coordination  [facility]  [phone]

## 2024-08-21 NOTE — PLAN OF CARE
Physical Therapy Discharge Summary    Reason for therapy discharge:    Discharged to transitional care facility.    Progress towards therapy goal(s). See goals on Care Plan in [facility] electronic health record for goal details.  Goals not met.  Barriers to achieving goals:   discharge from facility.    Therapy recommendation(s):    Continued therapy is recommended.  Rationale/Recommendations:  TCU recommended for further progression of strength and IND mobility.

## 2024-08-21 NOTE — PROVIDER NOTIFICATION
Page sent to Dr. Campos-B/p 150/41-has orders to give Norvasc PRN if SBP >140. Ok to still give even with lower diastolic?   Response received: ok to give PRN Norvasc.

## 2024-08-22 NOTE — PLAN OF CARE
Occupational Therapy: Orders received. Chart reviewed and discussed with care team.? Occupational Therapy not indicated due to pt requiring TCU at discharge, will defer OT needs to next level of care.? Defer discharge recommendations to evaluating PT and medical team.? Will complete orders.

## 2024-08-26 ENCOUNTER — PATIENT OUTREACH (OUTPATIENT)
Dept: CARE COORDINATION | Facility: CLINIC | Age: 79
End: 2024-08-26
Payer: MEDICARE

## 2024-08-26 NOTE — PROGRESS NOTES
Clinic Care Coordination Contact    The Community Health Worker planned to call for a Care Coordination outreach to follow up on goals and action steps.     Did Not Contact Patient.    Per Chart Review, patient was admitted at [facility] 8/17/24- 8/21/24 discharged to TCU.    CHW will outreach in one month.    HERMANN Hwang  Clinic Care Coordination  [facility]: [facility], [place], [place], and [place] [facility]  Phone: [phone]

## 2024-08-28 ENCOUNTER — TELEPHONE (OUTPATIENT)
Dept: ENDOCRINOLOGY | Facility: CLINIC | Age: 79
End: 2024-08-28
Payer: MEDICARE

## 2024-08-28 DIAGNOSIS — E11.9 TYPE 2 DIABETES, HBA1C GOAL < 7% (H): Primary | ICD-10-CM

## 2024-08-28 NOTE — TELEPHONE ENCOUNTER
Hello,    This message is to inform you that we are in need of Medicare compliant prescription for Vini 3 PLUS Sensor.  The Freestyle Vini 3 PLUS Sensor can be worn for 15 days instead of 14 days - due to Medicare billing, we will be switching all Vini 3 Sensor patients over to the Vini 3 PLUS Sensor (the Vini 3 PLUS sensor is compatible with the current Vini 3 [place]).     Prescription must be written by: MD Pino Trista     Must include full supply name: Freestyle Vini 3 Plus Sensor  Quantity: 6  Refills:1  SIG: Change every 15 days  (needs to be manually entered- not a pre-entered SIG)          Thank you!     [facility]  Diabetes Care Services Team  [address] [address]  Provider Phone: [phone]  Patient Line: [phone]  Fax: [phone]

## 2024-08-29 RX ORDER — BLOOD-GLUCOSE SENSOR
1 EACH MISCELLANEOUS ONCE
Qty: 6 EACH | Refills: 1 | Status: SHIPPED | OUTPATIENT
Start: 2024-08-29 | End: 2024-08-29

## 2024-08-30 ENCOUNTER — TELEPHONE (OUTPATIENT)
Dept: FAMILY MEDICINE | Facility: CLINIC | Age: 79
End: 2024-08-30
Payer: MEDICARE

## 2024-09-03 ENCOUNTER — TELEPHONE (OUTPATIENT)
Dept: FAMILY MEDICINE | Facility: CLINIC | Age: 79
End: 2024-09-03
Payer: MEDICARE

## 2024-09-03 ENCOUNTER — TELEPHONE (OUTPATIENT)
Dept: ENDOCRINOLOGY | Facility: CLINIC | Age: 79
End: 2024-09-03
Payer: MEDICARE

## 2024-09-03 DIAGNOSIS — E11.9 TYPE 2 DIABETES, HBA1C GOAL < 7% (H): Primary | ICD-10-CM

## 2024-09-03 NOTE — TELEPHONE ENCOUNTER
Health Call Center    Phone Message    May a detailed message be left on voicemail: yes     Reason for Call: Medication Question or concern regarding medication   Prescription Clarification  Name of Medication: test strips  Prescribing Provider: Dr. Weston   Pharmacy: [facility] [id number] [place] [id number] [phone]   What on the order needs clarification? Pt is needing this script sent to the pharmacy asap, pt does not have a way to test her BS today

## 2024-09-03 NOTE — TELEPHONE ENCOUNTER
Pt need appt with Dr Benoit in next 7 days for hosp follow up arm injury. She was discharged from tcu last Sat.

## 2024-09-04 ENCOUNTER — TELEPHONE (OUTPATIENT)
Dept: INTERNAL MEDICINE | Facility: CLINIC | Age: 79
End: 2024-09-04
Payer: MEDICARE

## 2024-09-04 ENCOUNTER — TELEPHONE (OUTPATIENT)
Dept: FAMILY MEDICINE | Facility: CLINIC | Age: 79
End: 2024-09-04
Payer: MEDICARE

## 2024-09-04 NOTE — TELEPHONE ENCOUNTER
"Pt called to follow up on left  bicep tendinopathy.  Has follow up here next week. Requesting referral to ortho. Note in Discharge from hospital omar advised pt to call ortho to schedule.  Advised pt to call ortho first but to call us back if still needing referral.        Note from Hospital \" Follow up with Dr. Angelita Solomon at [facility] in 4 weeks.    Please call [phone] to schedule\"    Darya Colindres RN  [facility] RN Triage Team      "

## 2024-09-04 NOTE — TELEPHONE ENCOUNTER
Home Care is calling regarding an established patient with [facility].       Requesting orders from: Shola Benoit  Provider is following patient: Yes  Is this a 60-day recertification request?  No    Orders Requested    Occupational Therapy  Request for continuation of care with increase in frequency  Frequency: twice per week x 2 weeks and once per week x 4 weeks        Home Care is calling regarding an established patient with [facility].       Requesting orders from: Shola Benoit  Provider is following patient: Yes  Is this a 60-day recertification request?  No    Orders Requested    HHA (Home Health Aide)  Request for continuation of care with increase in frequency  Frequency: once per week x 6 weeks      Confirmed ok to leave a detailed message with call back.  Contact information confirmed and updated as needed.    Angela Chan RN

## 2024-09-05 ENCOUNTER — TELEPHONE (OUTPATIENT)
Dept: FAMILY MEDICINE | Facility: CLINIC | Age: 79
End: 2024-09-05
Payer: MEDICARE

## 2024-09-05 ENCOUNTER — TRANSFERRED RECORDS (OUTPATIENT)
Dept: HEALTH INFORMATION MANAGEMENT | Facility: CLINIC | Age: 79
End: 2024-09-05
Payer: MEDICARE

## 2024-09-05 NOTE — TELEPHONE ENCOUNTER
Home Care is calling regarding an established patient with [facility].       Requesting orders from: Shola Benoit  Provider is following patient: Yes  Is this a 60-day recertification request?  No    Orders Requested    Physical Therapy  Request for delay in care, service is not able to be provided within same scheduled day.   Delay in start of care to the week of 9/9/24    Information was gathered and will be sent to provider for review.  RN will contact Home Care with information after provider review.  Confirmed ok to leave a detailed message with call back.  Contact information confirmed and updated as needed.    Alisson Almonte RN

## 2024-09-05 NOTE — TELEPHONE ENCOUNTER
Called and left message for Mo with the provider's response. Advised Mo to call the clinic back with any additional questions and/or concerns.     Alisson Almonte RN

## 2024-09-06 ENCOUNTER — PATIENT OUTREACH (OUTPATIENT)
Dept: CARE COORDINATION | Facility: CLINIC | Age: 79
End: 2024-09-06
Payer: MEDICARE

## 2024-09-06 NOTE — PROGRESS NOTES
Clinic Care Coordination Contact  [facility]    Clinical Data: Care Coordinator Outreach    Outreach Documentation Number of Outreach Attempt   9/6/2024   2:30 PM 1     Unable to leave , call dropped after outgoing voicemail message.     Plan: Care Coordinator will try to reach patient again in 1-2 business days.    Kaelyn Hennessy  [facility]  Clinic Care Coordinator  [facility]  [phone]  [email or web address]

## 2024-09-09 ENCOUNTER — OFFICE VISIT (OUTPATIENT)
Dept: FAMILY MEDICINE | Facility: CLINIC | Age: 79
End: 2024-09-09
Payer: MEDICARE

## 2024-09-09 ENCOUNTER — TELEPHONE (OUTPATIENT)
Dept: UROLOGY | Facility: CLINIC | Age: 79
End: 2024-09-09

## 2024-09-09 VITALS
WEIGHT: 208.4 LBS | SYSTOLIC BLOOD PRESSURE: 173 MMHG | OXYGEN SATURATION: 99 % | TEMPERATURE: 97.5 F | BODY MASS INDEX: 40.91 KG/M2 | HEIGHT: 60 IN | RESPIRATION RATE: 16 BRPM | HEART RATE: 64 BPM | DIASTOLIC BLOOD PRESSURE: 67 MMHG

## 2024-09-09 DIAGNOSIS — Z09 HOSPITAL DISCHARGE FOLLOW-UP: Primary | ICD-10-CM

## 2024-09-09 DIAGNOSIS — M79.622 PAIN OF LEFT UPPER ARM: ICD-10-CM

## 2024-09-09 DIAGNOSIS — I10 BENIGN ESSENTIAL HYPERTENSION: ICD-10-CM

## 2024-09-09 PROCEDURE — 99214 OFFICE O/P EST MOD 30 MIN: CPT | Performed by: NURSE PRACTITIONER

## 2024-09-09 RX ORDER — FUROSEMIDE 20 MG
20 TABLET ORAL DAILY PRN
COMMUNITY
Start: 2024-09-09

## 2024-09-09 RX ORDER — OLMESARTAN MEDOXOMIL 40 MG/1
40 TABLET ORAL DAILY
COMMUNITY
Start: 2024-09-09 | End: 2024-09-13

## 2024-09-09 ASSESSMENT — PAIN SCALES - GENERAL: PAINLEVEL: EXTREME PAIN (8)

## 2024-09-09 NOTE — PROGRESS NOTES
Clinic Care Coordination Contact    Situation: Patient chart reviewed by care coordinator.    Background: SW following for TCM call.  SW did not make second call because pt had follow up visit today and pt is connected with resources.      Assessment: No need for second TCM call.     Plan/Recommendations: Pt will remain in CCC.      Kaelyn Hennessy  [facility]  Clinic Care Coordinator  [facility]  [phone]  [email or web address]

## 2024-09-09 NOTE — TELEPHONE ENCOUNTER
[facility] Call Center    Phone Message    May a detailed message be left on voicemail: yes     Reason for Call: Patient, had an MRI at [facility] in [place] ([phone]).  Patient was advised there is a lesion in her bladder and she should follow up with her urologist.  Patient would like to speak to someone for next steps.      Action Taken: Message routed to:  Other: [facility]     Travel Screening: Not Applicable

## 2024-09-09 NOTE — PROGRESS NOTES
Assessment & Plan     Hospital discharge follow-up  Doing well since discharge. Has plans to follow-up with ortho, PT and OT.     Pain of left upper arm  As above. Likely biceps tendinopathy      Benign essential hypertension  Restart meds again. BP is elevated today. Only uses lasix prn.   - furosemide (LASIX) 20 MG tablet; Take 1 tablet (20 mg) by mouth daily as needed (lower extremity edema).  - olmesartan (BENICAR) 40 MG tablet; Take 1 tablet (40 mg) by mouth daily.        MED REC REQUIRED  Post Medication Reconciliation Status: discharge medications reconciled and changed, per note/orders        Subjective   Sammie is a 78 year old, presenting for the following health issues:  No chief complaint on file.    History of Present Illness       Reason for visit:  Follow up for hospital and Transitional Care stay.  Symptom onset:  3-4 weeks ago  Symptoms include:  Severe pain in upper left arm  Symptom intensity:  Severe  Symptom progression:  Staying the same  Had these symptoms before:  No  What makes it worse:  Using my left arm for anything  What makes it better:  Rest   She is taking medications regularly.         Hospital Follow-up Visit:    Hospital/Nursing Home/IP Rehab Facility: [facility]  Date of Admission: 8-  Date of Discharge: 8-. TCU 8/31/24  Reason(s) for Admission: LUE pain due to left biceps tendinopathy with possible left long head of the biceps tendon tear vs strain   -Evaluated by orthopedic surgery this admission and recommended nonoperative management     Generalized weakness and deconditioning  -Patient being discharged to TCU- [facility]       Summary of hospitalization:  [facility] discharge summary reviewed  TCU discharge unavailable  Diagnostic Tests/Treatments reviewed.  Follow up needed: none  Other Healthcare Providers Involved in Patient s Care:         Specialist appointment - ortho and Physical Therapy, OT  Update since  discharge: improved.         Plan of care communicated with patient             Recent hospitalization for left arm likely biceps tendinopathy   Had a little lightheadedness prior to hospitalization     Starting PT and OT tomorrow         Review of Systems  Detailed as above         Objective    BP (!) 173/67 (BP Location: Right arm, Patient Position: Sitting, Cuff Size: Adult Large)   Pulse 64   Temp 97.5  F (36.4  C) (Tympanic)   Resp 16   Ht 1.524 m (5')   Wt 94.5 kg (208 lb 6.4 oz)   LMP  (LMP Unknown)   SpO2 99%   BMI 40.70 kg/m    There is no height or weight on file to calculate BMI.  Physical Exam  Constitutional:       Appearance: Normal appearance.   Pulmonary:      Effort: Pulmonary effort is normal.   Neurological:      Mental Status: She is alert.   Psychiatric:         Mood and Affect: Mood normal.                    Signed Electronically by: MAGALY Shannon CNP

## 2024-09-10 ENCOUNTER — TELEPHONE (OUTPATIENT)
Dept: FAMILY MEDICINE | Facility: CLINIC | Age: 79
End: 2024-09-10
Payer: MEDICARE

## 2024-09-10 ENCOUNTER — MYC MEDICAL ADVICE (OUTPATIENT)
Dept: FAMILY MEDICINE | Facility: CLINIC | Age: 79
End: 2024-09-10
Payer: MEDICARE

## 2024-09-10 DIAGNOSIS — N32.89 BLADDER MASS: Primary | ICD-10-CM

## 2024-09-10 NOTE — TELEPHONE ENCOUNTER
Writer called and spoke with Mo, home care PT and notified of PCP's approval for requested home care orders.    Mo is appreciative, no further questions or concerns at this time.    Signing encounter.    Cee PINK  [facility]

## 2024-09-10 NOTE — TELEPHONE ENCOUNTER
Home Care is calling regarding an established patient with [facility].       Requesting orders from: Shola Benoit  Provider is following patient: Yes  Is this a 60-day recertification request?  No    Orders Requested    Physical Therapy  Request for initial certification (first set of orders)   Frequency:  1x/wk for 6 wks      Information was gathered and will be sent to provider for review.  RN will contact Home Care with information after provider review.  Confirmed ok to leave a detailed message with call back.  Contact information confirmed and updated as needed.    Vidya Zelaya RN

## 2024-09-11 ENCOUNTER — APPOINTMENT (OUTPATIENT)
Dept: CT IMAGING | Facility: CLINIC | Age: 79
End: 2024-09-11
Attending: STUDENT IN AN ORGANIZED HEALTH CARE EDUCATION/TRAINING PROGRAM
Payer: MEDICARE

## 2024-09-11 ENCOUNTER — NURSE TRIAGE (OUTPATIENT)
Dept: FAMILY MEDICINE | Facility: CLINIC | Age: 79
End: 2024-09-11
Payer: MEDICARE

## 2024-09-11 ENCOUNTER — HOSPITAL ENCOUNTER (EMERGENCY)
Facility: CLINIC | Age: 79
Discharge: HOME | End: 2024-09-11
Attending: STUDENT IN AN ORGANIZED HEALTH CARE EDUCATION/TRAINING PROGRAM | Admitting: STUDENT IN AN ORGANIZED HEALTH CARE EDUCATION/TRAINING PROGRAM
Payer: MEDICARE

## 2024-09-11 ENCOUNTER — APPOINTMENT (OUTPATIENT)
Dept: ULTRASOUND IMAGING | Facility: CLINIC | Age: 79
End: 2024-09-11
Attending: STUDENT IN AN ORGANIZED HEALTH CARE EDUCATION/TRAINING PROGRAM
Payer: MEDICARE

## 2024-09-11 VITALS
DIASTOLIC BLOOD PRESSURE: 49 MMHG | RESPIRATION RATE: 11 BRPM | HEIGHT: 63 IN | SYSTOLIC BLOOD PRESSURE: 142 MMHG | BODY MASS INDEX: 36.86 KG/M2 | WEIGHT: 208 LBS | HEART RATE: 77 BPM | TEMPERATURE: 98.6 F | OXYGEN SATURATION: 99 %

## 2024-09-11 DIAGNOSIS — R91.1 PULMONARY NODULE, RIGHT: ICD-10-CM

## 2024-09-11 DIAGNOSIS — L03.115 CELLULITIS OF RIGHT LOWER EXTREMITY: ICD-10-CM

## 2024-09-11 DIAGNOSIS — S81.802A LEG WOUND, LEFT, INITIAL ENCOUNTER: ICD-10-CM

## 2024-09-11 DIAGNOSIS — S81.801A LEG WOUND, RIGHT, INITIAL ENCOUNTER: ICD-10-CM

## 2024-09-11 LAB
ALBUMIN SERPL BCG-MCNC: 4.1 G/DL (ref 3.5–5.2)
ALBUMIN UR-MCNC: NEGATIVE MG/DL
ALP SERPL-CCNC: 52 U/L (ref 40–150)
ALT SERPL W P-5'-P-CCNC: 16 U/L (ref 0–50)
ANION GAP SERPL CALCULATED.3IONS-SCNC: 13 MMOL/L (ref 7–15)
APPEARANCE UR: CLEAR
AST SERPL W P-5'-P-CCNC: 24 U/L (ref 0–45)
BASOPHILS # BLD AUTO: 0 10E3/UL (ref 0–0.2)
BASOPHILS NFR BLD AUTO: 1 %
BILIRUB SERPL-MCNC: 0.3 MG/DL
BILIRUB UR QL STRIP: NEGATIVE
BUN SERPL-MCNC: 33.1 MG/DL (ref 8–23)
CALCIUM SERPL-MCNC: 9.2 MG/DL (ref 8.8–10.4)
CHLORIDE SERPL-SCNC: 107 MMOL/L (ref 98–107)
COLOR UR AUTO: YELLOW
CREAT SERPL-MCNC: 1.61 MG/DL (ref 0.51–0.95)
EGFRCR SERPLBLD CKD-EPI 2021: 33 ML/MIN/1.73M2
EOSINOPHIL # BLD AUTO: 0.3 10E3/UL (ref 0–0.7)
EOSINOPHIL NFR BLD AUTO: 5 %
ERYTHROCYTE [DISTWIDTH] IN BLOOD BY AUTOMATED COUNT: 12.4 % (ref 10–15)
GLUCOSE BLDC GLUCOMTR-MCNC: 85 MG/DL (ref 70–99)
GLUCOSE SERPL-MCNC: 171 MG/DL (ref 70–99)
GLUCOSE UR STRIP-MCNC: >=1000 MG/DL
HCO3 SERPL-SCNC: 20 MMOL/L (ref 22–29)
HCT VFR BLD AUTO: 34.2 % (ref 35–47)
HGB BLD-MCNC: 11.6 G/DL (ref 11.7–15.7)
HGB UR QL STRIP: NEGATIVE
HOLD SPECIMEN: NORMAL
IMM GRANULOCYTES # BLD: 0 10E3/UL
IMM GRANULOCYTES NFR BLD: 1 %
KETONES UR STRIP-MCNC: NEGATIVE MG/DL
LEUKOCYTE ESTERASE UR QL STRIP: ABNORMAL
LYMPHOCYTES # BLD AUTO: 2 10E3/UL (ref 0.8–5.3)
LYMPHOCYTES NFR BLD AUTO: 31 %
MCH RBC QN AUTO: 30.9 PG (ref 26.5–33)
MCHC RBC AUTO-ENTMCNC: 33.9 G/DL (ref 31.5–36.5)
MCV RBC AUTO: 91 FL (ref 78–100)
MONOCYTES # BLD AUTO: 0.7 10E3/UL (ref 0–1.3)
MONOCYTES NFR BLD AUTO: 10 %
NEUTROPHILS # BLD AUTO: 3.5 10E3/UL (ref 1.6–8.3)
NEUTROPHILS NFR BLD AUTO: 54 %
NITRATE UR QL: NEGATIVE
NRBC # BLD AUTO: 0 10E3/UL
NRBC BLD AUTO-RTO: 0 /100
NT-PROBNP SERPL-MCNC: 736 PG/ML (ref 0–1800)
PH UR STRIP: 5 [PH] (ref 5–7)
PLAT MORPH BLD: NORMAL
PLATELET # BLD AUTO: ABNORMAL 10*3/UL
POTASSIUM SERPL-SCNC: 4.9 MMOL/L (ref 3.4–5.3)
PROT SERPL-MCNC: 6.6 G/DL (ref 6.4–8.3)
RBC # BLD AUTO: 3.75 10E6/UL (ref 3.8–5.2)
RBC MORPH BLD: NORMAL
RBC URINE: 2 /HPF
SODIUM SERPL-SCNC: 140 MMOL/L (ref 135–145)
SP GR UR STRIP: 1.03 (ref 1–1.03)
SQUAMOUS EPITHELIAL: 1 /HPF
TROPONIN T SERPL HS-MCNC: 25 NG/L
TROPONIN T SERPL HS-MCNC: 28 NG/L
UROBILINOGEN UR STRIP-MCNC: NORMAL MG/DL
WBC # BLD AUTO: 6.5 10E3/UL (ref 4–11)
WBC URINE: 17 /HPF

## 2024-09-11 PROCEDURE — 81001 URINALYSIS AUTO W/SCOPE: CPT | Performed by: STUDENT IN AN ORGANIZED HEALTH CARE EDUCATION/TRAINING PROGRAM

## 2024-09-11 PROCEDURE — 250N000011 HC RX IP 250 OP 636: Performed by: STUDENT IN AN ORGANIZED HEALTH CARE EDUCATION/TRAINING PROGRAM

## 2024-09-11 PROCEDURE — 250N000013 HC RX MED GY IP 250 OP 250 PS 637: Performed by: STUDENT IN AN ORGANIZED HEALTH CARE EDUCATION/TRAINING PROGRAM

## 2024-09-11 PROCEDURE — 83880 ASSAY OF NATRIURETIC PEPTIDE: CPT | Performed by: STUDENT IN AN ORGANIZED HEALTH CARE EDUCATION/TRAINING PROGRAM

## 2024-09-11 PROCEDURE — 99285 EMERGENCY DEPT VISIT HI MDM: CPT | Mod: 25

## 2024-09-11 PROCEDURE — 84075 ASSAY ALKALINE PHOSPHATASE: CPT | Performed by: EMERGENCY MEDICINE

## 2024-09-11 PROCEDURE — 80053 COMPREHEN METABOLIC PANEL: CPT | Performed by: STUDENT IN AN ORGANIZED HEALTH CARE EDUCATION/TRAINING PROGRAM

## 2024-09-11 PROCEDURE — 84484 ASSAY OF TROPONIN QUANT: CPT | Performed by: STUDENT IN AN ORGANIZED HEALTH CARE EDUCATION/TRAINING PROGRAM

## 2024-09-11 PROCEDURE — 82962 GLUCOSE BLOOD TEST: CPT

## 2024-09-11 PROCEDURE — 84155 ASSAY OF PROTEIN SERUM: CPT | Performed by: EMERGENCY MEDICINE

## 2024-09-11 PROCEDURE — 85041 AUTOMATED RBC COUNT: CPT | Performed by: STUDENT IN AN ORGANIZED HEALTH CARE EDUCATION/TRAINING PROGRAM

## 2024-09-11 PROCEDURE — 36415 COLL VENOUS BLD VENIPUNCTURE: CPT | Performed by: STUDENT IN AN ORGANIZED HEALTH CARE EDUCATION/TRAINING PROGRAM

## 2024-09-11 PROCEDURE — 93970 EXTREMITY STUDY: CPT

## 2024-09-11 PROCEDURE — 87086 URINE CULTURE/COLONY COUNT: CPT | Performed by: STUDENT IN AN ORGANIZED HEALTH CARE EDUCATION/TRAINING PROGRAM

## 2024-09-11 PROCEDURE — 93005 ELECTROCARDIOGRAM TRACING: CPT

## 2024-09-11 PROCEDURE — 71275 CT ANGIOGRAPHY CHEST: CPT | Mod: MA

## 2024-09-11 PROCEDURE — 36415 COLL VENOUS BLD VENIPUNCTURE: CPT | Performed by: EMERGENCY MEDICINE

## 2024-09-11 RX ORDER — CEPHALEXIN 500 MG/1
500 CAPSULE ORAL ONCE
Status: COMPLETED | OUTPATIENT
Start: 2024-09-11 | End: 2024-09-11

## 2024-09-11 RX ORDER — IOPAMIDOL 755 MG/ML
500 INJECTION, SOLUTION INTRAVASCULAR ONCE
Status: COMPLETED | OUTPATIENT
Start: 2024-09-11 | End: 2024-09-11

## 2024-09-11 RX ORDER — CEPHALEXIN 500 MG/1
500 CAPSULE ORAL 4 TIMES DAILY
Qty: 14 CAPSULE | Refills: 0 | Status: SHIPPED | OUTPATIENT
Start: 2024-09-11 | End: 2024-09-18

## 2024-09-11 RX ORDER — CEPHALEXIN 500 MG/1
CAPSULE ORAL
Status: COMPLETED
Start: 2024-09-11 | End: 2024-09-11

## 2024-09-11 RX ADMIN — CEPHALEXIN 500 MG: 500 CAPSULE ORAL at 22:15

## 2024-09-11 RX ADMIN — IOPAMIDOL 100 ML: 755 INJECTION, SOLUTION INTRAVENOUS at 19:41

## 2024-09-11 ASSESSMENT — ACTIVITIES OF DAILY LIVING (ADL)
ADLS_ACUITY_SCORE: 41
ADLS_ACUITY_SCORE: 43

## 2024-09-11 ASSESSMENT — COLUMBIA-SUICIDE SEVERITY RATING SCALE - C-SSRS
6. HAVE YOU EVER DONE ANYTHING, STARTED TO DO ANYTHING, OR PREPARED TO DO ANYTHING TO END YOUR LIFE?: NO
2. HAVE YOU ACTUALLY HAD ANY THOUGHTS OF KILLING YOURSELF IN THE PAST MONTH?: NO
1. IN THE PAST MONTH, HAVE YOU WISHED YOU WERE DEAD OR WISHED YOU COULD GO TO SLEEP AND NOT WAKE UP?: NO

## 2024-09-11 NOTE — ED TRIAGE NOTES
Pt arrives c/o bilateral leg redness and swelling for the last couple of weeks. No open wounds, no draining. Also c/o intermittent abdominal pain. VSS, A&Ox4     Triage Assessment (Adult)       Row Name 09/11/24 1626          Triage Assessment    Airway WDL WDL     Additional Documentation Breath Sounds (Group)        Respiratory WDL    Respiratory WDL WDL        Cardiac WDL    Cardiac WDL WDL     Cardiac Rhythm NSR        Peripheral/Neurovascular WDL    Peripheral Neurovascular WDL WDL        Cognitive/Neuro/Behavioral WDL    Cognitive/Neuro/Behavioral WDL WDL

## 2024-09-11 NOTE — TELEPHONE ENCOUNTER
"Writer contacted patient and relayed provider message below~  Patient expressed understanding. Patient states that she will \"call around\" and see if she can get a ride and, if not, will wait for her daughter to return home from work at 6pm and ask that daughter take patient to the ED. Writer emphasized that patient contact 911 and the risks of delaying treatment. Patient insists that she does not want to call 911.     Reference message:    "

## 2024-09-11 NOTE — TELEPHONE ENCOUNTER
Pt notified to go to ER per separate Triage Nurse Enc.   Closing encounter.     Elizabeth SOLIS MA

## 2024-09-11 NOTE — TELEPHONE ENCOUNTER
"See 9/10/24  pictures.     Patient reports having cellulitis in both legs for the past few weeks that was improving, she is not taking antibiotics.   Swelling has increased a little bit over the last week.   Over the last week, patient has noticed 3-4x while laying down, notices some difficulty breathing so she doesn't lay flat.    Patient is having difficulty explaining the changes in her legs.  Having a little bit of pain in both legs at site of redness/skin flakes. She has been applying CerVe lotion that helps.  Yesterday the right leg had increased redness, left leg had small amount of clear/light yellow drainage, and both legs felt warm to the touch.    Swelling in right leg extends up 4-5\" above ankle  Swelling in left leg extends 8-9\" above ankle   No fevers or chest pain.    Hx of DVT in upper extremity         Dispo: Go to ED Now  Patient declines ED stating she had an angiogram and her heart was healthy. Patient can come into clinic tomorrow if she is able to schedule transportation with [employer] (does not allow same-day scheduling).     Reason for Disposition   Difficulty breathing at rest    Additional Information   Negative: Sounds like a life-threatening emergency to the triager   Negative: Chest pain   Negative: Followed an insect bite and has localized swelling (e.g., small area of puffy or swollen skin)   Negative: Followed a knee injury   Negative: Ankle or foot injury   Negative: Pregnant with leg swelling or edema    Protocols used: Leg Swelling and Edema-A-OH    "

## 2024-09-11 NOTE — ED PROVIDER NOTES
Emergency Department Note      History of Present Illness     Chief Complaint   Leg Swelling and Abdominal Pain      HPI   Chasity Hodgson is a 78 year old female with multiple comorbidities, presenting with concerns of lower extremity cellulitis.  Patient states she was seen by her occupational therapist who recommended she go to the ED due to the skin changes on her right leg.  Patient states she chronically has skin changes in both of her legs.  She is not in any increased pain.  She states the redness in the right leg might be slightly worse over the last couple weeks.  No fevers, chills, body aches.  She also has mild intermittent shortness of breath for multiple weeks that is currently very mild.  No chest pain.  Her right leg has been slightly more swollen recently.  She also has intermittent left-sided abdominal pain for multiple weeks.  Does have a history of an ostomy.  No pain with eating.  No vomiting.  No difficulty stooling or urinating.    Independent Historian   None    Review of External Notes   August 21, 2024, discharge summary for bicep tendon tear.    Past Medical History     Medical History and Problem List   Past Medical History:   Diagnosis Date    Abdominal adhesions 1984, 96,99    Abdominal adhesions     Acne rosacea     Allergic rhinitis     Allergic rhinitis, cause unspecified     Alopecia     Anemia     CAD (coronary artery disease)     Carotid stenosis     CKD (chronic kidney disease) stage 2, GFR 60-89 ml/min     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)     CPAP (continuous positive airway pressure) dependence     Depression     Diabetic gastroparesis (H)     Diet-controlled type 2 diabetes mellitus (H)     DM2 (diabetes mellitus, type 2) (H)     DVT (deep venous thrombosis) (H)     DVT of axillary vein, acute right (H)     Fibromyalgia     Gastro-oesophageal reflux disease     GERD (gastroesophageal reflux disease)     Hernia of unspecified site of abdominal cavity without mention of obstruction or  gangrene     Hernia, abdominal     History of blood transfusion     History of thrombophlebitis     HTN (hypertension)     HTN (hypertension)     Hypertriglyceridemia     Hypertriglyceridemia     Hypokalemia     Hypothyroidism     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    Mumps     Obstructive sleep apnea     ANNETTE (obstructive sleep apnea)     ANNETTE on CPAP     Osteoarthritis of glenohumeral joint     Papillary carcinoma of thyroid (H)     Papillary carcinoma of thyroid (H)     PE (pulmonary embolism)     Poliomyelitis     Poliomyelitis     Postsurgical hypothyroidism     Pulmonary embolism (H)     Pulmonary embolus (H)     Pulmonary nodule     Rosacea     S/P carpal tunnel release     S/P hardware removal 01/2014    S/P shoulder surgery     Septic joint (H)     Septic joint of right knee joint (H)     Venous insufficiency     Venous insufficiency     Venous thrombosis 1999       Medications   cephALEXin (KEFLEX) 500 MG capsule  acetaminophen (TYLENOL) 500 MG tablet  amLODIPine (NORVASC) 2.5 MG tablet  amoxicillin (AMOXIL) 500 MG capsule  aspirin (ASA) 81 MG EC tablet  BIOTIN PO  blood glucose (NO BRAND SPECIFIED) test strip  calcium citrate 250 MG TABS  carvedilol (COREG) 12.5 MG tablet  clotrimazole (LOTRIMIN) 1 % cream  Continuous Glucose Sensor (FREESTYLE BRANDY 3 PLUS SENSOR) MISC  Continuous Glucose Sensor (FREESTYLE BRANDY 3 SENSOR) MISC  cyanocobalamin (VITAMIN B-12) 1000 MCG tablet  empagliflozin (JARDIANCE) 10 MG TABS tablet  ezetimibe (ZETIA) 10 MG tablet  famotidine (PEPCID) 20 MG tablet  fenofibrate (TRICOR) 145 MG tablet  fexofenadine (ALLEGRA) 180 MG tablet  furosemide (LASIX) 20 MG tablet  icosapent ethyl (VASCEPA) 1 g CAPS capsule  insulin aspart (NOVOLOG PEN) 100 UNIT/ML pen  INSULIN GLARGINE 100 UNIT/ML pen  ipratropium (ATROVENT) 0.03 % nasal spray  ketoconazole (NIZORAL) 2 % external cream  levothyroxine (SYNTHROID/LEVOTHROID) 112 MCG tablet  Lidocaine (LIDOCARE) 4 % Patch  methocarbamol (ROBAXIN) 500  MG tablet  minoxidil (LONITEN) 2.5 MG tablet  Multiple vitamin TABS  nitroGLYcerin (NITROSTAT) 0.4 MG sublingual tablet  olmesartan (BENICAR) 40 MG tablet  ondansetron (ZOFRAN) 4 MG tablet  oxyCODONE (ROXICODONE) 5 MG tablet  pantoprazole (PROTONIX) 40 MG EC tablet  polyethylene glycol (MIRALAX) 17 g packet  Polyethylene Glycol 400 (BLINK TEARS) 0.25 % GEL  senna-docusate (SENOKOT-S/PERICOLACE) 8.6-50 MG tablet  sucralfate (CARAFATE) 1 GM/10ML suspension  tretinoin (RETIN-A) 0.025 % external cream  triamcinolone (KENALOG) 0.025 % external ointment  Vitamin D3 50 mcg (2000 units) tablet        Surgical History   Past Surgical History:   Procedure Laterality Date    AMPUTATE TOE(S)  03/15/2012    Procedure:AMPUTATE TOE(S); Surgeon:RUBEN MOSES; Location: OR    AMPUTATE TOE(S)      APPENDECTOMY  1972    APPENDECTOMY      ARTHRODESIS FOOT  03/15/2012    Procedure:ARTHRODESIS FOOT; RIGHT TRIPLE ARTHRODESIS, FIFTH TOE AMPUTATION, LATERAL LIGAMENT RECONSTRUCTION, TENDON TRANSFER AND RELEASE [MINI C-ARM, ARTHREX 4.5 AND 6.7 STAPLES, BIOCOMPOSITE TENODESIS SCREWS]; Surgeon:RUBEN MOSES; Location: OR    ARTHRODESIS FOOT Right     Right foot triple arthrodesis and removal of hardware    ARTHROSCOPY SHOULDER  06/25/2015    REVISION SUBACROMIAL DECOMPRESSION, EXCISION OF GANGLION CYST, DEBRIDEMENT AND EXCISION OF THE GLENOHUMERAL JOINT GANGLION CYST, CORACOID DECOMPRESSION, POSSIBLE SUBSCAPULARIS REPAIR AND OPEN SUBSCAPULARIS BICEP    ARTHROSCOPY SHOULDER ROTATOR CUFF REPAIR      BIOPSY  Thyroid 2002    BLADDER SURGERY      BOTOX INJECTION MEDICAL      BREAST SURGERY  Biopsy    CHOLECYSTECTOMY      CHOLECYSTECTOMY      COLONOSCOPY  2018    COLOSTOMY  02/07/2012    Procedure:COLOSTOMY; CREATION OF SIGMOID COLOSTOMY AND EXTENSIVE  LYSIS OF ADHESIONS; Surgeon:MONTSERRAT BENDER; Location: OR    COLOSTOMY      CV ANGIOGRAM RENAL BILATERAL Bilateral 11/17/2023    Procedure: Angiogram Renal Bilateral;   "Surgeon: Ankit Bhatia MD;  Location:  [facility]    CV CORONARY ANGIOGRAM N/A 11/17/2023    Procedure: Coronary Angiogram;  Surgeon: Ankit Bhatia MD;  Location:  [facility]    CYSTOSCOPY      CYSTOSCOPY, INJECT BOTOX N/A 09/18/2023    Procedure: CYSTOSCOPY, WITH BOTULINUM TOXIN INJECTION;  Surgeon: Mishel Zendejas MD;  Location: [facility] OR    CYSTOSCOPY, INJECT BOTOX N/A 2/20/2024    Procedure: CYSTOSCOPY, WITH BOTULINUM TOXIN INJECTION;  Surgeon: Mishel Zendejas MD;  Location: [facility] OR    EYE SURGERY      GENITOURINARY SURGERY  1999    GI SURGERY      weakened rectal sphincter with artificial stimulator    HERNIA REPAIR  1976    HYSTERECTOMY TOTAL ABDOMINAL      LAPAROTOMY, LYSIS ADHESIONS, COMBINED  02/07/2012    Procedure:COMBINED LAPAROTOMY, LYSIS ADHESIONS; Surgeon:MONTSERRAT BENDER; Location: OR    RELEASE CARPAL TUNNEL      RELEASE TENDON FOOT  03/15/2012    Procedure:RELEASE TENDON FOOT; Surgeon:RUBEN MOSES; Location: OR    REMOVE HARDWARE FOOT  12/13/2012    Procedure: REMOVE HARDWARE FOOT;  RIGHT FOOT REMOVAL OF HARDWARE;  Surgeon: Ruben Moses MD;  Location:  OR    SHOULDER SURGERY  11/12/2020    LEFT SHOULDER HEMIARHTROPLASTY, BICEP TENODESIS    SOFT TISSUE SURGERY  2018, 2020    TENDON RELEASE      foot    THYROIDECTOMY      [facility] FREEING BOWEL ADHESION,ENTEROLYSIS      1986, 1996, 1999    Z NONSPECIFIC PROCEDURE      throidectomy    Z TOTAL ABDOM HYSTERECTOMY  1980    + BSO       Physical Exam     Patient Vitals for the past 24 hrs:   BP Temp Temp src Pulse Resp SpO2 Height Weight   09/11/24 2221 -- -- -- -- -- -- -- 94.3 kg (208 lb)   09/11/24 2220 (!) 142/49 -- -- -- -- 99 % -- --   09/11/24 2212 -- -- -- -- -- -- 1.6 m (5' 3\") --   09/11/24 2200 -- -- -- 77 -- 100 % -- --   09/11/24 2145 -- -- -- 73 11 99 % -- --   09/11/24 2130 -- -- -- 78 21 96 % -- --   09/11/24 2115 -- -- -- 72 12 98 % -- --   09/11/24 " 2100 (!) 155/68 -- -- 78 16 100 % -- --   09/11/24 2030 137/51 -- -- 72 13 99 % -- --   09/11/24 1630 (!) 167/66 -- -- -- -- -- -- --   09/11/24 1625 -- 98.6  F (37  C) Temporal 67 18 97 % -- --     Physical Exam  GENERAL: Patient well-appearing  HEAD: Atraumatic.  NECK: No rigidity  CV: RRR, no murmurs, rubs or gallops  PULM: CTAB with good aeration; no retractions, rales, rhonchi, or wheezing  ABD: Soft, nontender, nondistended, no guarding.  Ostomy in place in abdomen without tenderness surrounding.  DERM: Chronic venous stasis changes in bilateral lower extremities.  Mild warmth in bilateral lower extremities.  Chronic appearing wounds of bilateral lower extremities.  Faint blanching erythema right lower extremity.  No crepitus or bullae.    EXTREMITY: 2+ edema in bilateral lower extremities.  Right lower extremity compartments are soft and pliable.  Neuro: Sensation intact to light touch bilateral extremities. Chronic foot drop right lower extremity.       Diagnostics     Lab Results   Labs Ordered and Resulted from Time of ED Arrival to Time of ED Departure   COMPREHENSIVE METABOLIC PANEL - Abnormal       Result Value    Sodium 140      Potassium 4.9      Carbon Dioxide (CO2) 20 (*)     Anion Gap 13      Urea Nitrogen 33.1 (*)     Creatinine 1.61 (*)     GFR Estimate 33 (*)     Calcium 9.2      Chloride 107      Glucose 171 (*)     Alkaline Phosphatase 52      AST 24      ALT 16      Protein Total 6.6      Albumin 4.1      Bilirubin Total 0.3     ROUTINE UA WITH MICROSCOPIC REFLEX TO CULTURE - Abnormal    Color Urine Yellow      Appearance Urine Clear      Glucose Urine >=1000 (*)     Bilirubin Urine Negative      Ketones Urine Negative      Specific Gravity Urine 1.027      Blood Urine Negative      pH Urine 5.0      Protein Albumin Urine Negative      Urobilinogen Urine Normal      Nitrite Urine Negative      Leukocyte Esterase Urine Moderate (*)     RBC Urine 2      WBC Urine 17 (*)     Squamous Epithelials  Urine 1     CBC WITH PLATELETS AND DIFFERENTIAL - Abnormal    WBC Count 6.5      RBC Count 3.75 (*)     Hemoglobin 11.6 (*)     Hematocrit 34.2 (*)     MCV 91      MCH 30.9      MCHC 33.9      RDW 12.4      Platelet Count        % Neutrophils 54      % Lymphocytes 31      % Monocytes 10      % Eosinophils 5      % Basophils 1      % Immature Granulocytes 1      NRBCs per 100 WBC 0      Absolute Neutrophils 3.5      Absolute Lymphocytes 2.0      Absolute Monocytes 0.7      Absolute Eosinophils 0.3      Absolute Basophils 0.0      Absolute Immature Granulocytes 0.0      Absolute NRBCs 0.0     TROPONIN T, HIGH SENSITIVITY - Abnormal    Troponin T, High Sensitivity 28 (*)    TROPONIN T, HIGH SENSITIVITY - Abnormal    Troponin T, High Sensitivity 25 (*)    NT PROBNP INPATIENT - Normal    N terminal Pro BNP Inpatient 736     GLUCOSE BY METER - Normal    GLUCOSE BY METER POCT 85     RBC AND PLATELET MORPHOLOGY    RBC Morphology Confirmed RBC Indices      Platelet Assessment        Value: Automated Count Confirmed. Platelet morphology is normal.   URINE CULTURE       Imaging   US Lower Extremity Venous Duplex Bilateral   Final Result   IMPRESSION:   1.  No deep venous thrombosis in the bilateral lower extremities.      CT Chest (PE) Abdomen Pelvis w Contrast   Final Result   IMPRESSION:   1.  No pulmonary emboli identified.   2.  No etiology for abdominal pain evident. Nothing obstructive or inflammatory.   3.  7 mm indeterminate pulmonary nodule right middle lobe. Suggest follow-up CT in 6-12 months to confirm stability.      REFERENCE:   Guidelines for Management of Incidental Pulmonary Nodules Detected on CT Images: From the Fleischner Society 2017.    Guidelines apply to incidental nodules in patients who are 35 years or older.   Guidelines do not apply to lung cancer screening, patients with immunosuppression, or patients with known primary cancer.      SINGLE NODULE:   Nodule size 6-8 mm   Low-risk patients: Follow-up  CT at 6-12 months, then consider CT at 18-24 months.   High-risk patients: Follow-up CT at 6-12 months, then at 18-24 months if no change.                EKG   ECG interpreted by me.  Time 1650  NSR at 70. No ST elevation or depression. Normal intervals. Normal axis.   No evidence of WPW, Brugada, HOCM, ARVD, ASD, or Wellen's.         Independent Interpretation   None    ED Course      Medications Administered   Medications   sodium chloride (PF) 0.9% PF flush 100 mL (90 mLs Intravenous $Given 9/11/24 1941)   iopamidol (ISOVUE-370) solution 500 mL (100 mLs Intravenous $Given 9/11/24 1941)   cephALEXin (KEFLEX) capsule 500 mg (500 mg Oral $Given 9/11/24 2215)       Procedures   Procedures     Discussion of Management   None    ED Course        Additional Documentation  None    Medical Decision Making / Diagnosis      CT for PE was ordered because the patient is high risk for pulmonary embolism.       DARIUS Hodgson is a 78 year old female with complex medical history, presenting with primary concern of potential cellulitis to the right lower extremity.  Differential diagnosis-considered cellulitis, DVT, deep space infection, among many others.  Due to her concomitant shortness of breath, right lower extremity swelling, and redness and prior history of DVT and PE, higher risk for PE, so ordered CT PE as well as abdomen and pelvis.  Thankfully negative for acute process.  Does have pulmonary nodule.  Listed in discharge diagnoses.  Duplex ultrasounds negative for DVT in the legs.  Troponins chronically minimally elevated.  No chest pain.  Do not think she requires ACS workup.  ECG is without acute ischemia.  Also has renal insufficiency but not significantly changed from baseline.  The skin changes in her lower extremities very well could just be chronic venous stasis changes and chronic wounds.  Due to the potential slight increase in erythema and warmth, did prescribe Keflex.  Given first dose here.  Discussed  elevating legs and compressive socks.  I have evaluated the patient for acute medical emergencies and have clinically decided no further acute medical interventions are required. Reassessed multiple times and well appearing. Patient stable for discharge. All questions answered. Given strict return precautions. Patient content with plan. The differential diagnosis and treatment modalities were discussed thoroughly with the patient. Recommended PCP follow-up in 2-3 days.      Disposition   The patient was discharged.     Diagnosis     ICD-10-CM    1. Pulmonary nodule, right  R91.1       2. Leg wound, right, initial encounter  S81.801A       3. Leg wound, left, initial encounter  S81.802A       4. Cellulitis of right lower extremity  L03.115            Discharge Medications   Discharge Medication List as of 9/11/2024 10:10 PM        START taking these medications    Details   cephALEXin (KEFLEX) 500 MG capsule Take 1 capsule (500 mg) by mouth 4 times daily for 7 days., Disp-14 capsule, R-0, E-Prescribe               MD Yan Davidson Kevin, MD  09/12/24 7092      Patient also contacted the following day in regards to the need for repeat chest imaging in 6 to 12 months for the pulm nodule.     Sourav Heredia MD  09/12/24 0875

## 2024-09-12 LAB
ATRIAL RATE - MUSE: 70 BPM
DIASTOLIC BLOOD PRESSURE - MUSE: NORMAL MMHG
INTERPRETATION ECG - MUSE: NORMAL
P AXIS - MUSE: 74 DEGREES
PR INTERVAL - MUSE: 168 MS
QRS DURATION - MUSE: 96 MS
QT - MUSE: 384 MS
QTC - MUSE: 414 MS
R AXIS - MUSE: 2 DEGREES
SYSTOLIC BLOOD PRESSURE - MUSE: NORMAL MMHG
T AXIS - MUSE: 51 DEGREES
VENTRICULAR RATE- MUSE: 70 BPM

## 2024-09-12 NOTE — DISCHARGE INSTRUCTIONS
Return to the emergency department if symptoms are worsening, become concerning, or for any other concerns. Follow-up with your doctor in 2-3 and sooner if needed.    Discharge Instructions  Cellulitis    Cellulitis is an infection of the skin that occurs when bacteria enter the skin.   Symptoms are generally redness, swelling, warmth and pain.  Your infection appeared to be appropriate to treat at home with antibiotics.  However, sometimes your infection may be worse than it seemed at first, or may worsen with time. If you have new or worse symptoms, you may need to be seen again in the Emergency Department or by your primary provider.    Generally, every Emergency Department visit should have a follow-up clinic visit with either a primary or a specialty clinic/provider. Please follow-up as instructed by your emergency provider today.    Return to the Emergency Department if:  The redness, pain, or swelling gets a lot worse.  If the red area was marked, return if it is red significantly beyond the marked area.  You are unable to get your antibiotics, or are vomiting (throwing up) these pills, or you cannot take them.  You are feeling more ill, weak or lightheaded.  You start to run a new fever (temperature >101 F).  Anything else about the infection worries or concerns you.  Treatment:  Start your antibiotics right away, and take them as prescribed. Be sure to finish the whole prescription, even if you are better.  Apply a heating pad, warm packs, or warm water soaks to the infected area for 15 minutes at a time, at least 3 times a day. Do not use a heating pad on your feet or legs if you have diabetes. Do not sleep with a heating pad on, since this can cause burns or skin injury.  Rest your injured area for at least 1-2 days. After that you may start using your extremity again as long as there is not too much pain.   Raise the injured area above the level of your heart as much as possible in the first 1-2  days.  Tylenol  (acetaminophen), Motrin  (ibuprofen), or Advil  (ibuprofen) may help may help reduce pain and fever and may help you feel more comfortable. Be sure to read and follow the package directions, and ask your provider if you have questions.    If you were given a prescription for medicine here today, be sure to read all of the information (including the package insert) that comes with your prescription.  This will include important information about the medicine, its side effects, and any warnings that you need to know about.  The pharmacist who fills the prescription can provide more information and answer questions you may have about the medicine.  If you have questions or concerns that the pharmacist cannot address, please call or return to the Emergency Department.     Remember that you can always come back to the Emergency Department if you are not able to see your regular provider in the amount of time listed above, if you get any new symptoms, or if there is anything that worries you.

## 2024-09-13 ENCOUNTER — MYC REFILL (OUTPATIENT)
Dept: FAMILY MEDICINE | Facility: CLINIC | Age: 79
End: 2024-09-13
Payer: MEDICARE

## 2024-09-13 DIAGNOSIS — I10 BENIGN ESSENTIAL HYPERTENSION: ICD-10-CM

## 2024-09-13 LAB — BACTERIA UR CULT: NORMAL

## 2024-09-13 RX ORDER — OLMESARTAN MEDOXOMIL 40 MG/1
40 TABLET ORAL DAILY
Qty: 90 TABLET | Refills: 3 | Status: SHIPPED | OUTPATIENT
Start: 2024-09-13

## 2024-09-19 ENCOUNTER — TELEPHONE (OUTPATIENT)
Dept: UROLOGY | Facility: CLINIC | Age: 79
End: 2024-09-19
Payer: MEDICARE

## 2024-09-19 ENCOUNTER — HOSPITAL ENCOUNTER (OUTPATIENT)
Dept: CT IMAGING | Facility: CLINIC | Age: 79
Discharge: HOME | End: 2024-09-19
Attending: UROLOGY | Admitting: UROLOGY
Payer: MEDICARE

## 2024-09-19 DIAGNOSIS — R39.89 SUSPECTED UTI: Primary | ICD-10-CM

## 2024-09-19 DIAGNOSIS — N32.89 BLADDER MASS: ICD-10-CM

## 2024-09-19 PROCEDURE — 250N000011 HC RX IP 250 OP 636: Performed by: UROLOGY

## 2024-09-19 PROCEDURE — 74176 CT ABD & PELVIS W/O CONTRAST: CPT | Mod: MG

## 2024-09-19 PROCEDURE — 250N000009 HC RX 250: Performed by: UROLOGY

## 2024-09-19 PROCEDURE — 999N000040 HC STATISTIC CONSULT NO CHARGE VASC ACCESS

## 2024-09-19 RX ORDER — IOPAMIDOL 755 MG/ML
102 INJECTION, SOLUTION INTRAVASCULAR ONCE
Status: COMPLETED | OUTPATIENT
Start: 2024-09-19 | End: 2024-09-19

## 2024-09-19 RX ADMIN — IOPAMIDOL 102 ML: 755 INJECTION, SOLUTION INTRAVENOUS at 11:20

## 2024-09-19 RX ADMIN — SODIUM CHLORIDE 69 ML: 9 INJECTION, SOLUTION INTRAVENOUS at 11:20

## 2024-09-19 NOTE — TELEPHONE ENCOUNTER
"Per Dr. Zendejas -\"Why dont we schedule her for a cysto?    She can try a compression wrap?  See PCP if not better\"    Called patient and informed of MD's response. Patient was transferred to scheduling to arrange a Cystoscopy. She will also have a catheterized urine sample prior to cysto.     Violeta Gallegos LPN    "

## 2024-09-19 NOTE — TELEPHONE ENCOUNTER
[facility] Call Center    Phone Message    May a detailed message be left on voicemail: yes     Reason for Call: Patient states her IV infiltrated while they were doing the CT Urogram wo & w Contrast. Patient states they need new orders to complete this imaging. Patient also says she has some swelling and is requesting call to discuss care plan.    Action Taken: Other: [facility] - Urology    Travel Screening: Not Applicable     Date of Service:

## 2024-09-20 DIAGNOSIS — Z53.9 DIAGNOSIS NOT YET DEFINED: Primary | ICD-10-CM

## 2024-09-20 PROCEDURE — G0180 MD CERTIFICATION HHA PATIENT: HCPCS | Performed by: INTERNAL MEDICINE

## 2024-09-25 ENCOUNTER — PRE VISIT (OUTPATIENT)
Dept: UROLOGY | Facility: CLINIC | Age: 79
End: 2024-09-25
Payer: MEDICARE

## 2024-09-25 ENCOUNTER — MYC MEDICAL ADVICE (OUTPATIENT)
Dept: DERMATOLOGY | Facility: CLINIC | Age: 79
End: 2024-09-25
Payer: MEDICARE

## 2024-09-25 NOTE — TELEPHONE ENCOUNTER
Reason for visit: Discuss Botox     Relevant information: Recent imaging for bladder lesion also. Has upcoming appointment for cystoscopy. Botox last done 2/20/2024 with Dr. Zendejas.    Records/imaging/labs/orders: All records available.    Pt called: No need for a call    At Rooming: Virtual visit.     Silva Jenkins  9/25/2024  9:39 AM

## 2024-09-25 NOTE — TELEPHONE ENCOUNTER
Called patient and switched appointment to earlier date and time. Patient was provided the details and had no questions or concerns.    Jennifer Loes LPN

## 2024-09-26 ENCOUNTER — PATIENT OUTREACH (OUTPATIENT)
Dept: CARE COORDINATION | Facility: CLINIC | Age: 79
End: 2024-09-26
Payer: MEDICARE

## 2024-09-27 ENCOUNTER — MYC MEDICAL ADVICE (OUTPATIENT)
Dept: ENDOCRINOLOGY | Facility: CLINIC | Age: 79
End: 2024-09-27

## 2024-09-27 ENCOUNTER — VIRTUAL VISIT (OUTPATIENT)
Dept: FAMILY MEDICINE | Facility: CLINIC | Age: 79
End: 2024-09-27
Payer: MEDICARE

## 2024-09-27 ENCOUNTER — TELEPHONE (OUTPATIENT)
Dept: ENDOCRINOLOGY | Facility: CLINIC | Age: 79
End: 2024-09-27

## 2024-09-27 DIAGNOSIS — Z98.890 S/P SHOULDER SURGERY: ICD-10-CM

## 2024-09-27 DIAGNOSIS — I87.2 VENOUS INSUFFICIENCY: Primary | ICD-10-CM

## 2024-09-27 DIAGNOSIS — E11.9 TYPE 2 DIABETES, HBA1C GOAL < 7% (H): Primary | ICD-10-CM

## 2024-09-27 PROCEDURE — 99443 PR PHYSICIAN TELEPHONE EVALUATION 21-30 MIN: CPT | Mod: 95 | Performed by: INTERNAL MEDICINE

## 2024-09-27 RX ORDER — INSULIN GLARGINE 100 [IU]/ML
20 INJECTION, SOLUTION SUBCUTANEOUS AT BEDTIME
Qty: 15 ML | Refills: 4 | Status: SHIPPED | OUTPATIENT
Start: 2024-09-27

## 2024-09-27 NOTE — TELEPHONE ENCOUNTER
Pt is requesting new rx for    Basaglar kwikpen 100unit/ml sopn    Will not let me order off list, please verify and send new rx. Thank you!     [facility]  [phone]

## 2024-09-27 NOTE — PROGRESS NOTES
Sammie is a 78 year old who is being evaluated via a billable video visit.    How would you like to obtain your AVS? MyChart  If the video visit is dropped, the invitation should be resent by: Text to cell phone: [phone]  Will anyone else be joining your video visit? No          Subjective   Sammie is a 78 year old, presenting for the following health issues:  ER F/U      Video Start Time: 1:13 PM    HPI     ED/UC Followup:    Facility:  [facility] Emergency [facility]   Date of visit: 9/11/2024  Reason for visit: Leg Swelling, Abdominal Pain   Current Status: Not much change since visit.      Venous Stasis Dermatitis   Chasity Hodgson has had still redness involving her right lower extremity.  She has had no change in the color and no change in swelling.  She has not had any draining.  She was treated with antibiotics on 09/11 - Keflex which didn't change the redness much.  She has not any draining from the right leg.  She has no fever.  She notes some warmth at the site.  She does not feel that her symptoms are more venous stasis changes as she has difficulty elevating her feet.  She is unable to use any compression as she has difficulty using her left arm.  She is not interested in transitional care unit, but does have occupational therapy and physical therapy coming out weekly, but not nursing cares.   Status post left shoulder rotator cuff repair  Left upper arm pain  Has had follow up in orthopedics since her recent pain incident exiting [facility].  She is recommended to do some range of motion exercises and use heat.  She will continue to follow up orthopedics.  Unfortunately she must postpone her back surgery due to shoulder injury.        Review of Systems  Constitutional, HEENT, cardiovascular, pulmonary, GI, , musculoskeletal, neuro, skin, endocrine and psych systems are negative, except as otherwise noted.      Objective           Vitals:  No vitals were obtained today due to virtual visit.    Physical  Exam   GENERAL: alert and no distress  EYES: Eyes grossly normal to inspection.  No discharge or erythema, or obvious scleral/conjunctival abnormalities.  RESP: No audible wheeze, cough, or visible cyanosis.    SKIN: Visible skin clear. No significant rash, abnormal pigmentation or lesions.  NEURO: Cranial nerves grossly intact.  Mentation and speech appropriate for age.  PSYCH: Appropriate affect, tone, and pace of words    (I87.2) Venous insufficiency  (primary encounter diagnosis)  Comment: We will continue current medications and use of amlodipine.  Not taking any diuretics.  Rcommended that she try to use compression stockings if she is able.  Referral to [facility] to help with this   Plan: Primary Care - Care Coordination Referral            (Z98.890) S/P shoulder surgery  Comment: as above   Plan: Primary Care - Care Coordination Referral                    Telephone-Visit Details    Type of service:  Telephone Visit   Telephone End Time:1:30 PM  Originating Location (pt. Location): Home    Distant Location (provider location):  On-site  Platform used for Telephone Visit: Doximkei  Signed Electronically by: Shola Benoit MD, MD

## 2024-10-02 ENCOUNTER — PATIENT OUTREACH (OUTPATIENT)
Dept: CARE COORDINATION | Facility: CLINIC | Age: 79
End: 2024-10-02
Payer: MEDICARE

## 2024-10-02 NOTE — LETTER
[facility]  Patient Centered Plan of Care  About Me:        Patient Name:  Chasity Hodgson    YOB: 1945  Age:         78 year old   [place] MRN:    [id number] Telephone Information:  Home Phone [phone]   Mobile [phone]       Address:  [address] [place] [address] Email address:  [email or web address]      Emergency Contact(s)    Name Relationship Lgl Grd Work Phone Home Phone Mobile Phone   1. LETICIA GALLEGOS Daughter No  [phone]   2. JOSE HODGSON Son No   [phone]           Primary language:  English     needed? No   [place] [facility]:  [phone] op. 1  Other communication barriers:None    Preferred Method of Communication:  [place]  Current living arrangement: I live in a private home    Mobility Status/ Medical Equipment: Independent w/Device        Health Maintenance  Health Maintenance Reviewed: Due/Overdue   Health Maintenance Due   Topic Date Due    URINE DRUG SCREEN  Never done    ZOSTER IMMUNIZATION (2 of 3) 10/24/2016    RSV VACCINE (1 - 1-dose 75+ series) Never done    MEDICARE ANNUAL WELLNESS VISIT  10/12/2023    INFLUENZA VACCINE (1) 09/01/2024    COVID-19 Vaccine (7 - 2024-25 season) 09/01/2024    MICROALBUMIN  09/04/2024           My Access Plan  Medical Emergency 911   Primary Clinic Line [facility] [phone]   24 Hour Appointment Line [phone] or  [phone]) (toll-free)   24 Hour Nurse Line [phone] (toll-free)   Preferred Urgent Care [facility], [phone]     Preferred Hospital [facility]  [phone]     Preferred Pharmacy [place] [facility] [address]     Behavioral Health Crisis Line The National Suicide Prevention Lifeline at 1-509.981.4291 or Text/Call 438           My Care Team Members  Patient Care Team         Relationship Specialty Notifications Start End    [place],  Shola Coyle MD PCP - General Internal Medicine  10/22/12     Phone: [phone] Pager: [phone] Fax: [phone] [address] [place] [address] [facility] [address]    Shola Benoit MD Assigned PCP   10/4/12     Phone: [phone] Pager: [phone] Fax: [phone] [address] [place] [address] [facility] [address]    Renetta Meyer MD MD Dermatology  4/17/15     Phone: [phone] Fax: [phone]         [address] [facility] [address] [facility] [address]    ROMI Roque MD MD INTERNAL MEDICINE - ENDOCRINOLOGY, DIABETES & METABOLISM  2/23/16     Phone: [phone] Fax: [phone]         ENDOCRINE CLINIC [facility] [address]  [place] [address] [facility] [address]    Ulises Pantoja MD MD Gastroenterology  9/12/17     Phone: [phone] Fax: [phone]         [place] [facility] [place] [address] [facility] [address] [facility] [address]    Kishore Ferrera MD MD Orthopedics  2/23/18     Phone: [phone] Fax: [phone]         [address]  [place] [address] [facility] [address]    Tara Cornejo RN Diabetes Educator Diabetes Education  8/22/19     Phone: [phone] Fax: [phone]        Odette Weston MD MD Endocrinology, Diabetes, and Metabolism  9/13/21     Phone: [phone] Fax: [phone]         [facility] [place] [address]    Odette Weston MD Assigned Endocrinology Provider   10/3/21     Phone: [phone] Fax: [phone]         [facility] [place] [address]    Goltz, Bennett Ezra, PA-C Assigned Neuroscience Provider   9/17/22     Phone: [phone] Fax: [phone] [address] [place] [address] [facility] [address]    Terra Bridges PA-C Physician Assistant Urology  10/14/22     Phone: [phone] Fax: [phone]         [address] [facility] [place] [address] [facility] [address]    Amber Chan APRN CNP Assigned Heart and Vascular Provider   4/8/23     Phone: [phone] Fax: [phone] [address] [place] [address]    Michelle  Marbella Schafer PA-C Physician Assistant Urology  5/23/23     Phone: [phone] Fax: [phone]         [address] [facility] [address]    Yolanda Tapia PA-C Physician Assistant Endocrinology, Diabetes, and Metabolism  6/9/23     Phone: [phone] Fax: [phone]         [address] [place] [address]    Kaelyn Hennessy [facility] Lead Care Coordinator  Admissions 11/27/23     Phone: [phone]         Renetta Meyer MD Assigned Surgical Provider   12/2/23     Phone: [phone] Fax: [phone]         [address] [place] [address]    Nikia Wna CHW Community Health Worker Primary Care - CC Admissions 12/5/23     Phone: [phone]         Lupe Mcrae PA-C Physician Assistant   1/16/24     Phone: [phone] Fax: [phone]         [address] [place] [address]    Florencia Boles [facility] Pharmacist Pharmacist  4/15/24     Phone: [phone] Fax: [phone] [address] [place] [address]    Florencia Boles RPH Assigned MTM Pharmacist   4/23/24     Phone: [phone] Fax: [phone] [address] [place] [address]                My Care Plans  Self Management and Treatment Plan    Care Plan  Care Plan: [facility]       Problem: Insufficient In-home support       Note:     Goal Statement: Sammie will continue working with Care Coordination to ensure her needs are met for overall health and wellbeing.  Date Goal Set: 12/5/23  Barriers: Limited finances  Strengths: Strong support system  Date to Achieve By: 12/5/24  Patient expressed understanding of goal: yes  Action steps to achieve this goal:  1. I will continue to follow up with medical team as needed  2. I will work with FRW on discussing and applying for E/W program  4. I will consider options for chore services in my home from CCSW and CHW  5. I will outreach to Care Coordination  for further questions or concerns          Goal:  Establish adequate home support       This Visit's Progress: 50% Recent Progress: 40%                            Action Plans on File:                       Advance Care Plans/Directives:   Advanced Care Plan/Directives on file:   Yes    Status of Document(s):   On File and Validated    Advanced Care Plan/Directives Type:   Advanced Directive - On File           My Medical and Care Information  Problem List   Patient Active Problem List   Diagnosis    Low back pain    Mixed hyperlipidemia    Benign essential hypertension    Fibromyalgia    Allergic rhinitis    ANNETTE (obstructive sleep apnea)    Cavovarus deformity of foot    History of DVT (deep vein thrombosis)    Hypokalemia    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)    Anemia due to blood loss, acute    Postsurgical hypothyroidism    Type 2 diabetes, HbA1c goal < 7% (H)    Gastroparesis    Papillary carcinoma of thyroid (H)    Alopecia    Pulmonary nodule    Esophageal reflux    Dermatitis    Acne rosacea    Diabetic gastroparesis (H)    Poliomyelitis    Left knee pain    Carotid stenosis    Right shoulder pain    Type 2 diabetes mellitus with other specified complication (H)    Insufficiency, arterial, peripheral (H)    Allergic dermatitis due to other chemical product    Normocytic anemia    Morbid obesity with BMI of 40.0-44.9, adult (H)    Mild major depression (H)    Chronic kidney disease, stage 3b (H)    Renal artery stenosis (H)    Neuropathic pain    Mild major depression (H)    Venous insufficiency    Avascular necrosis of bone of hip, left (H)    Contact dermatitis    Diabetic nephropathy associated with type 2 diabetes mellitus (H)    DVT of upper extremity (deep vein thrombosis) (H)    Dysphagia    Edema of lower extremity    History of colonic polyps    History of pulmonary embolus (PE)    Iron deficiency anemia    Inguinal pain    Moderate protein-calorie malnutrition (H)    Osteoarthritis of left hip    Primary insomnia    Right lower quadrant pain    Status post  total shoulder arthroplasty, left    Surgical aftercare, musculoskeletal system    Vitamin D deficiency    Lymphedema    Atypical chest pain    Episodic tension-type headache, not intractable    Blurred vision    Pain of cheek    Abdominal adhesions    Mixed incontinence    OAB (overactive bladder)    Cellulitis of lower extremity, unspecified laterality    Cellulitis    Acute chest pain    Venous stasis ulcer of left lower leg with edema of left lower leg (H)    Post-polio syndrome (H)    Osteopenia of right hip    Spinal stenosis of lumbar region with neurogenic claudication    Pain of left upper arm    Acute pain of left shoulder    S/P shoulder surgery      Current Medications and Allergies:  See printed Medication Report.    Care Coordination Start Date: 11/27/2023   Frequency of Care Coordination: monthly, more frequently as needed     Form Last Updated: 10/02/2024

## 2024-10-02 NOTE — PROGRESS NOTES
Clinic Care Coordination Contact  Care Coordination Clinician Chart Review    Situation: Patient chart reviewed by Care Coordinator.       Background: Care Coordination Program started: 11/27/2023. Initial assessment completed and patient-centered care plan(s) were developed with participation from patient. Lead CC handed patient off to CHW for continued outreaches.       Assessment: Per chart review, pt was in ED and also TCU.  Patient is not due for updated Plan of Care.  Assessments will be completed annually or as needed/with change of patient status.      Care Plan: Community Resources       Problem: Insufficient In-home support       Note:     Goal Statement: Sammie will continue working with Care Coordination to ensure her needs are met for overall health and wellbeing.  Date Goal Set: 12/5/23  Barriers: Limited finances  Strengths: Strong support system  Date to Achieve By: 12/5/24  Patient expressed understanding of goal: yes  Action steps to achieve this goal:  1. I will continue to follow up with medical team as needed  2. I will work with FRW on discussing and applying for E/W program  4. I will consider options for chore services in my home from CCSW and CHW  5. I will outreach to Care Coordination  for further questions or concerns          Goal: Establish adequate home support       This Visit's Progress: 50% Recent Progress: 40%                               Plan/Recommendations: The patient will continue working with Care Coordination to achieve goal(s) as above. CHW will continue outreaches at minimum every 30 days and will involve Lead CC as needed or if patient is ready to move to Maintenance. Lead CC will continue to monitor CHW outreaches and patient's progress to goal(s) every 6 weeks.     Plan of Care updated and sent to patient: Yes, via [place] Hennessy  [facility]  Clinic Care Coordinator  [facility]  [facility]  [phone]  [email or web address]

## 2024-10-03 ENCOUNTER — VIRTUAL VISIT (OUTPATIENT)
Dept: UROLOGY | Facility: CLINIC | Age: 79
End: 2024-10-03
Payer: MEDICARE

## 2024-10-03 ENCOUNTER — PATIENT OUTREACH (OUTPATIENT)
Dept: CARE COORDINATION | Facility: CLINIC | Age: 79
End: 2024-10-03

## 2024-10-03 DIAGNOSIS — N32.89 BLADDER MASS: Primary | ICD-10-CM

## 2024-10-03 DIAGNOSIS — N39.46 MIXED INCONTINENCE: ICD-10-CM

## 2024-10-03 DIAGNOSIS — N32.81 OAB (OVERACTIVE BLADDER): ICD-10-CM

## 2024-10-03 PROCEDURE — 99214 OFFICE O/P EST MOD 30 MIN: CPT | Mod: 95 | Performed by: UROLOGY

## 2024-10-03 RX ORDER — CEFAZOLIN SODIUM 2 G/50ML
2 SOLUTION INTRAVENOUS SEE ADMIN INSTRUCTIONS
Status: CANCELLED | OUTPATIENT
Start: 2024-10-03

## 2024-10-03 RX ORDER — CEFAZOLIN SODIUM 2 G/50ML
2 SOLUTION INTRAVENOUS
Status: CANCELLED | OUTPATIENT
Start: 2024-10-03

## 2024-10-03 RX ORDER — ACETAMINOPHEN 325 MG/1
975 TABLET ORAL ONCE
Status: CANCELLED | OUTPATIENT
Start: 2024-10-03 | End: 2024-10-03

## 2024-10-03 RX ORDER — ACETAMINOPHEN 650 MG/1
650 SUPPOSITORY RECTAL ONCE
Status: CANCELLED | OUTPATIENT
Start: 2024-10-03

## 2024-10-03 NOTE — PROGRESS NOTES
Called patient due to their preventative visit is overdue. Transferred patient to scheduling line. [phone]

## 2024-10-03 NOTE — NURSING NOTE
Current patient location: [facility] [address]  [facility] [address]    Is the patient currently in the state of [place]? YES    Visit mode:VIDEO    If the visit is dropped, the patient can be reconnected by: VIDEO VISIT: Send to e-mail at: [email or web address]    Will anyone else be joining the visit? NO  (If patient encounters technical issues they should call [phone]    Are changes needed to the allergy or medication list? Yes medication requested for removal     Are refills needed on medications prescribed by this physician? NO    Rooming Documentation:  Not applicable    Reason for visit: RECHECK (Discuss botox )    Jackie ALANIS

## 2024-10-03 NOTE — PATIENT INSTRUCTIONS
Websites with free information:    [employer] patient website: www.voicesforpfd.org    Total Control Program: www.totalcontrolprogram.com    Purvi PINK [phone]    Surgery scheduler [phone]    It was a pleasure meeting with you today.  Thank you for allowing me and my team the privilege of caring for you today.  YOU are the reason we are here, and I truly hope we provided you with the excellent service you deserve.  Please let us know if there is anything else we can do for you so that we can be sure you are leaving completely satisfied with your care experience.

## 2024-10-03 NOTE — PROGRESS NOTES
Virtual Visit Details    Type of service:  Video Visit   Video Start Time: 10:40 AM  Video End Time:10:48 AM    Originating Location (pt. Location): Home    Distant Location (provider location):  On-site  Platform used for Video Visit: AmWell    October 3, 2024    Sammie was seen today for recheck.    Diagnoses and all orders for this visit:    Bladder mass  -     US Pelvic Complete with Transvaginal; Future  -     Case Request: EXAM UNDER ANESTHESIA, PELVIS, WITH CYSTOSCOPY, POSSIBLE TRANSURETHRAL RESECTION BLADDER TUMOR, POSSIBLE INTRAVESICAL BOTULINUM TOXIN INJECTION; Standing  -     Case Request: EXAM UNDER ANESTHESIA, PELVIS, WITH CYSTOSCOPY, POSSIBLE TRANSURETHRAL RESECTION BLADDER TUMOR, POSSIBLE INTRAVESICAL BOTULINUM TOXIN INJECTION    OAB (overactive bladder)  -     Case Request: EXAM UNDER ANESTHESIA, PELVIS, WITH CYSTOSCOPY, POSSIBLE TRANSURETHRAL RESECTION BLADDER TUMOR, POSSIBLE INTRAVESICAL BOTULINUM TOXIN INJECTION; Standing  -     Case Request: EXAM UNDER ANESTHESIA, PELVIS, WITH CYSTOSCOPY, POSSIBLE TRANSURETHRAL RESECTION BLADDER TUMOR, POSSIBLE INTRAVESICAL BOTULINUM TOXIN INJECTION    Mixed incontinence  -     Case Request: EXAM UNDER ANESTHESIA, PELVIS, WITH CYSTOSCOPY, POSSIBLE TRANSURETHRAL RESECTION BLADDER TUMOR, POSSIBLE INTRAVESICAL BOTULINUM TOXIN INJECTION; Standing  -     Case Request: EXAM UNDER ANESTHESIA, PELVIS, WITH CYSTOSCOPY, POSSIBLE TRANSURETHRAL RESECTION BLADDER TUMOR, POSSIBLE INTRAVESICAL BOTULINUM TOXIN INJECTION    Other orders  -     Void on call to OR; Standing  -     Skin  Incision-free  Procedures:  NO Nasal Decolonization or Skin Antisepsis Interventions Needed; Standing  -     NO Hair Removal Needed; Standing  -     Forced Air Warming Device; Standing  -     Nozin Nasal  Popswab; Standing  -     Skin Antisepsis - DAY OF PROCEDURE; Standing  -     Skin Antisepsis - CURRENT INPATIENT; Standing  -     Glucose monitor nursing POCT - Glycemic Management PERIOP;  Standing  -     ACUTE Indwelling urinary catheter (Bello); Standing  -     Straight Catheterization; Standing  -     Discontinue indwelling urinary catheter (Bello); Standing  -     Notify Provider - Anticoagulants and Antiplatelets; Standing  -     Glucose monitor nursing POCT; Standing  -     NPO per Anesthesia Guidelines for Procedure/Surgery Except for: Meds; Standing  -     Apply Pneumatic Compression Device (PCD); Standing  -     Pneumatic Compression Device (PCD) (Equipment); Standing  -     ceFAZolin (ANCEF) 2 g in dextrose 50 mL intermittent infusion  -     ceFAZolin (ANCEF) 2 g in dextrose 50 mL intermittent infusion  -     acetaminophen (TYLENOL) tablet 975 mg  -     acetaminophen (TYLENOL) Suppository 650 mg       At this time will plan to go to the OR for cystoscopy and pelvic exam first given there is a question of a new bladder mass.      As long as there is no obvious bladder abnormality will plan on botox, she wishes to try a slightly higher dose so will plan on 200 units.  If there is a concerning bladder mass then will plan on TURBT.  She expresses understanding of this and agreeable to proceed    30 minutes were spent today on the day of the encounter in reviewing the EMR including interpretation of the CT scan, direct patient care including surgical counseling, coordination of care and documentation    Mishel Zendejas MD MPH  (she/her/hers)   of Urology  [facility]    Subjective    She is here today for follow up.  She underwent a MRI for her back and showed a question of a bladder mass.  She underwent a CT and is here today to discuss repeating the botox as she states that it really helped, but after 4-5 months the symptoms have returned.      Asks if there is anything in the vagina contributing as she has not had a pap smear recently but denies any history of abnormal pap smears.  Denies vaginal bleeding or discharge    She denies any changes in health since last  visit    LMP  (LMP Unknown)   GENERAL: healthy, alert and no distress  EYES: Eyes grossly normal to inspection, conjunctivae and sclerae normal  HENT: normal cephalic/atraumatic.  External ears, nose and mouth without ulcers or lesions.  RESP: no audible wheeze, cough, or visible cyanosis.  No visible retractions or increased work of breathing.  Able to speak fully in complete sentences.  NEURO: Cranial nerves grossly intact, mentation intact and speech normal  PSYCH: mentation appears normal, affect normal/bright, judgement and insight intact, normal speech and appearance well-groomed    Labs/imaging/pathology  CT images from 9/19/24 reviewed.  On my interpretation there is no obvious bladder lesion but there is some streak artifact from her prior hip replacement.  The urethral changes are consistent with her prior bulking      CC  Patient Care Team:  Shola Benoit MD as PCP - General (Internal Medicine)  Shola Benoit MD as Assigned PCP  Renetta Meyer MD as MD (Dermatology)  ROMI Roque MD as MD (INTERNAL MEDICINE - ENDOCRINOLOGY, DIABETES & METABOLISM)  Ulises Pantoja MD as MD (Gastroenterology)  Kishore Ferrera MD as MD (Orthopedics)  Tara Cornejo, RN as Diabetes Educator (Diabetes Education)  Odette Weston MD as MD (Endocrinology, Diabetes, and Metabolism)  Odette Weston MD as Assigned Endocrinology Provider  Goltz, Bennett Ezra, PA-C as Assigned Neuroscience Provider  Terra Bridges PA-C as Physician Assistant (Urology)  Amber Chan APRN CNP as Assigned Heart and Vascular Provider  Marbella Martinez PA-C as Physician Assistant (Urology)  Yolanda Tapia PA-C as Physician Assistant (Endocrinology, Diabetes, and Metabolism)  [facility]  Renetta Meyer MD as Assigned Surgical Provider  Nikia Wan CHW as Community Health Worker (Primary Care - CC)  Lupe Mcrae PA-C as  Physician Assistant  Florencia Boles RPH as Pharmacist (Pharmacist)  Florencia Boles RPH as Assigned MTM Pharmacist  SELF, REFERRED

## 2024-10-03 NOTE — LETTER
10/3/2024       RE: Chasity Hodgson  [address]  [facility] [address]     Dear Colleague,    Thank you for referring your patient, Chasity Hodgson, to the [facility] [place] at [facility]. Please see a copy of my visit note below.    Virtual Visit Details    Type of service:  Video Visit   Video Start Time: 10:40 AM  Video End Time:10:48 AM    Originating Location (pt. Location): Home    Distant Location (provider location):  On-site  Platform used for Video Visit: [facility]    October 3, 2024    Sammie was seen today for recheck.    Diagnoses and all orders for this visit:    Bladder mass  -     US Pelvic Complete with Transvaginal; Future  -     Case Request: EXAM UNDER ANESTHESIA, PELVIS, WITH CYSTOSCOPY, POSSIBLE TRANSURETHRAL RESECTION BLADDER TUMOR, POSSIBLE INTRAVESICAL BOTULINUM TOXIN INJECTION; Standing  -     Case Request: EXAM UNDER ANESTHESIA, PELVIS, WITH CYSTOSCOPY, POSSIBLE TRANSURETHRAL RESECTION BLADDER TUMOR, POSSIBLE INTRAVESICAL BOTULINUM TOXIN INJECTION    OAB (overactive bladder)  -     Case Request: EXAM UNDER ANESTHESIA, PELVIS, WITH CYSTOSCOPY, POSSIBLE TRANSURETHRAL RESECTION BLADDER TUMOR, POSSIBLE INTRAVESICAL BOTULINUM TOXIN INJECTION; Standing  -     Case Request: EXAM UNDER ANESTHESIA, PELVIS, WITH CYSTOSCOPY, POSSIBLE TRANSURETHRAL RESECTION BLADDER TUMOR, POSSIBLE INTRAVESICAL BOTULINUM TOXIN INJECTION    Mixed incontinence  -     Case Request: EXAM UNDER ANESTHESIA, PELVIS, WITH CYSTOSCOPY, POSSIBLE TRANSURETHRAL RESECTION BLADDER TUMOR, POSSIBLE INTRAVESICAL BOTULINUM TOXIN INJECTION; Standing  -     Case Request: EXAM UNDER ANESTHESIA, PELVIS, WITH CYSTOSCOPY, POSSIBLE TRANSURETHRAL RESECTION BLADDER TUMOR, POSSIBLE INTRAVESICAL BOTULINUM TOXIN INJECTION    Other orders  -     Void on call to OR; Standing  -     Skin  Incision-free  Procedures:  NO Nasal Decolonization or Skin Antisepsis Interventions Needed;  Standing  -     NO Hair Removal Needed; Standing  -     Forced Air Warming Device; Standing  -     Nozin Nasal  Popswab; Standing  -     Skin Antisepsis - DAY OF PROCEDURE; Standing  -     Skin Antisepsis - CURRENT INPATIENT; Standing  -     Glucose monitor nursing POCT - Glycemic Management PERIOP; Standing  -     ACUTE Indwelling urinary catheter (Bello); Standing  -     Straight Catheterization; Standing  -     Discontinue indwelling urinary catheter (Bello); Standing  -     Notify Provider - Anticoagulants and Antiplatelets; Standing  -     Glucose monitor nursing POCT; Standing  -     NPO per Anesthesia Guidelines for Procedure/Surgery Except for: Meds; Standing  -     Apply Pneumatic Compression Device (PCD); Standing  -     Pneumatic Compression Device (PCD) (Equipment); Standing  -     ceFAZolin (ANCEF) 2 g in dextrose 50 mL intermittent infusion  -     ceFAZolin (ANCEF) 2 g in dextrose 50 mL intermittent infusion  -     acetaminophen (TYLENOL) tablet 975 mg  -     acetaminophen (TYLENOL) Suppository 650 mg       At this time will plan to go to the OR for cystoscopy and pelvic exam first given there is a question of a new bladder mass.      As long as there is no obvious bladder abnormality will plan on botox, she wishes to try a slightly higher dose so will plan on 200 units.  If there is a concerning bladder mass then will plan on TURBT.  She expresses understanding of this and agreeable to proceed    30 minutes were spent today on the day of the encounter in reviewing the EMR including interpretation of the CT scan, direct patient care including surgical counseling, coordination of care and documentation    Mishel Zendejas MD MPH  (she/her/hers)   of Urology  [facility]    Subjective    She is here today for follow up.  She underwent a MRI for her back and showed a question of a bladder mass.  She underwent a CT and is here today to discuss repeating the botox as she  states that it really helped, but after 4-5 months the symptoms have returned.      Asks if there is anything in the vagina contributing as she has not had a pap smear recently but denies any history of abnormal pap smears.  Denies vaginal bleeding or discharge    She denies any changes in health since last visit    LMP  (LMP Unknown)   GENERAL: healthy, alert and no distress  EYES: Eyes grossly normal to inspection, conjunctivae and sclerae normal  HENT: normal cephalic/atraumatic.  External ears, nose and mouth without ulcers or lesions.  RESP: no audible wheeze, cough, or visible cyanosis.  No visible retractions or increased work of breathing.  Able to speak fully in complete sentences.  NEURO: Cranial nerves grossly intact, mentation intact and speech normal  PSYCH: mentation appears normal, affect normal/bright, judgement and insight intact, normal speech and appearance well-groomed    Labs/imaging/pathology  CT images from 9/19/24 reviewed.  On my interpretation there is no obvious bladder lesion but there is some streak artifact from her prior hip replacement.  The urethral changes are consistent with her prior bulking      CC  Patient Care Team:  Shola Benoit MD as PCP - General (Internal Medicine)  Shola Benoit MD as Assigned PCP  Renetta Meyer MD as MD (Dermatology)  ROMI Roque MD as MD (INTERNAL MEDICINE - ENDOCRINOLOGY, DIABETES & METABOLISM)  Ulises Pantoja MD as MD (Gastroenterology)  Kishore Ferrera MD as MD (Orthopedics)  Tara Cornejo, RN as Diabetes Educator (Diabetes Education)  Odette Weston MD as MD (Endocrinology, Diabetes, and Metabolism)  Odette Weston MD as Assigned Endocrinology Provider  Goltz, Bennett Ezra, PA-C as Assigned Neuroscience Provider  Terra Bridges PA-C as Physician Assistant (Urology)  Amber Chan APRN CNP as Assigned Heart and Vascular Provider  Marbella Martinez PA-C as Physician Assistant  (Urology)  Yolanda Tapia PA-C as Physician Assistant (Endocrinology, Diabetes, and Metabolism)  [facility] as Lead Care Coordinator  Renetta Meyer MD as Assigned Surgical Provider  Nikia Wan CHW as Community Health Worker (Primary Care - CC)  Lupe Mcrae PA-C as Physician Assistant  Florencia Boles RPH as Pharmacist (Pharmacist)  Florencia Boles RPH as Assigned [place] Pharmacist  SELF, REFERRED        Again, thank you for allowing me to participate in the care of your patient.      Sincerely,    Mishel Zendejas MD

## 2024-10-04 ENCOUNTER — TELEPHONE (OUTPATIENT)
Dept: CARDIOLOGY | Facility: CLINIC | Age: 79
End: 2024-10-04
Payer: MEDICARE

## 2024-10-04 ENCOUNTER — PATIENT OUTREACH (OUTPATIENT)
Dept: CARE COORDINATION | Facility: CLINIC | Age: 79
End: 2024-10-04
Payer: MEDICARE

## 2024-10-04 ENCOUNTER — TELEPHONE (OUTPATIENT)
Dept: UROLOGY | Facility: CLINIC | Age: 79
End: 2024-10-04
Payer: MEDICARE

## 2024-10-04 PROBLEM — N32.89 BLADDER MASS: Status: ACTIVE | Noted: 2024-10-03

## 2024-10-04 NOTE — TELEPHONE ENCOUNTER
Called patient to schedule surgery with Dr. Zendejas    Spoke with: Sammie    Date of Surgery: 11/4    Approximate arrival time given:  Yes    Location of surgery: [facility]     Pre-Op H&P: PAC 10/24 at 1015    LAB: 10/24 at 0945    US: 10/24 at 1200    Post-Op Appt Date: NEEDS 6 WK PO. NOTHING AVAILABLE ON SCHEUDLE     Imaging needed:  No    Discussed with patient PAC RN will provide arrival time and instructions for surgery at the time of the appointment: [[facility] locations only]: Yes    Packet sent out: Yes 10/04/24  via Mail - Standard      Additional Comments:  na    All patients questions were answered and was instructed to review surgical packet and call back with any questions or concerns.       Tierney Guaman on 10/4/2024 at 12:23 PM

## 2024-10-04 NOTE — TELEPHONE ENCOUNTER
Called patient to let her know that we need to move appt earlier time due to change in providers schedule     LVM and callback information    [phone]    Bertha Laird  Structural Heart Procedure   [facility]/ [facility]

## 2024-10-04 NOTE — PROGRESS NOTES
Clinic Care Coordination Contact  [facility]/Voicemail    Clinical Data: Care Coordinator Outreach    Outreach Documentation Number of Outreach Attempt   9/6/2024   2:30 PM 1   10/4/2024   2:25 PM 1       Left message on patient's voicemail with call back information and requested return call.    Plan: Care Coordinator will try to reach patient again in 10 business days.    HERMANN Hwang  Clinic Care Coordination  [facility]: [facility] [place], and [facility]  Phone: [phone]

## 2024-10-07 ENCOUNTER — OFFICE VISIT (OUTPATIENT)
Dept: FAMILY MEDICINE | Facility: CLINIC | Age: 79
End: 2024-10-07
Payer: MEDICARE

## 2024-10-07 VITALS
RESPIRATION RATE: 10 BRPM | HEART RATE: 75 BPM | TEMPERATURE: 98 F | WEIGHT: 208.6 LBS | BODY MASS INDEX: 36.96 KG/M2 | OXYGEN SATURATION: 99 % | HEIGHT: 63 IN | DIASTOLIC BLOOD PRESSURE: 78 MMHG | SYSTOLIC BLOOD PRESSURE: 141 MMHG

## 2024-10-07 DIAGNOSIS — N18.32 CHRONIC KIDNEY DISEASE, STAGE 3B (H): Primary | ICD-10-CM

## 2024-10-07 DIAGNOSIS — I87.2 VENOUS INSUFFICIENCY: ICD-10-CM

## 2024-10-07 DIAGNOSIS — E11.9 TYPE 2 DIABETES, HBA1C GOAL < 7% (H): ICD-10-CM

## 2024-10-07 DIAGNOSIS — R93.89 ABNORMAL FINDINGS ON DIAGNOSTIC IMAGING OF OTHER SPECIFIED BODY STRUCTURES: ICD-10-CM

## 2024-10-07 DIAGNOSIS — R39.89 SUSPECTED UTI: ICD-10-CM

## 2024-10-07 LAB
ALBUMIN UR-MCNC: NEGATIVE MG/DL
ANION GAP SERPL CALCULATED.3IONS-SCNC: 12 MMOL/L (ref 7–15)
APPEARANCE UR: CLEAR
BASOPHILS # BLD AUTO: 0 10E3/UL (ref 0–0.2)
BASOPHILS NFR BLD AUTO: 1 %
BILIRUB UR QL STRIP: NEGATIVE
BUN SERPL-MCNC: 31.1 MG/DL (ref 8–23)
CALCIUM SERPL-MCNC: 8.7 MG/DL (ref 8.8–10.4)
CHLORIDE SERPL-SCNC: 107 MMOL/L (ref 98–107)
COLOR UR AUTO: YELLOW
CREAT SERPL-MCNC: 1.34 MG/DL (ref 0.51–0.95)
EGFRCR SERPLBLD CKD-EPI 2021: 41 ML/MIN/1.73M2
EOSINOPHIL # BLD AUTO: 0.2 10E3/UL (ref 0–0.7)
EOSINOPHIL NFR BLD AUTO: 3 %
ERYTHROCYTE [DISTWIDTH] IN BLOOD BY AUTOMATED COUNT: 12.4 % (ref 10–15)
EST. AVERAGE GLUCOSE BLD GHB EST-MCNC: 171 MG/DL
GLUCOSE SERPL-MCNC: 181 MG/DL (ref 70–99)
GLUCOSE UR STRIP-MCNC: >=1000 MG/DL
HBA1C MFR BLD: 7.6 % (ref 0–5.6)
HCO3 SERPL-SCNC: 21 MMOL/L (ref 22–29)
HCT VFR BLD AUTO: 34.9 % (ref 35–47)
HGB BLD-MCNC: 11.4 G/DL (ref 11.7–15.7)
HGB UR QL STRIP: NEGATIVE
IMM GRANULOCYTES # BLD: 0 10E3/UL
IMM GRANULOCYTES NFR BLD: 0 %
KETONES UR STRIP-MCNC: NEGATIVE MG/DL
LEUKOCYTE ESTERASE UR QL STRIP: NEGATIVE
LYMPHOCYTES # BLD AUTO: 2 10E3/UL (ref 0.8–5.3)
LYMPHOCYTES NFR BLD AUTO: 28 %
MCH RBC QN AUTO: 30.6 PG (ref 26.5–33)
MCHC RBC AUTO-ENTMCNC: 32.7 G/DL (ref 31.5–36.5)
MCV RBC AUTO: 94 FL (ref 78–100)
MONOCYTES # BLD AUTO: 0.6 10E3/UL (ref 0–1.3)
MONOCYTES NFR BLD AUTO: 9 %
NEUTROPHILS # BLD AUTO: 4.2 10E3/UL (ref 1.6–8.3)
NEUTROPHILS NFR BLD AUTO: 59 %
NITRATE UR QL: NEGATIVE
PH UR STRIP: 5.5 [PH] (ref 5–7)
PLATELET # BLD AUTO: 200 10E3/UL (ref 150–450)
POTASSIUM SERPL-SCNC: 4.5 MMOL/L (ref 3.4–5.3)
RBC # BLD AUTO: 3.72 10E6/UL (ref 3.8–5.2)
SODIUM SERPL-SCNC: 140 MMOL/L (ref 135–145)
SP GR UR STRIP: 1.01 (ref 1–1.03)
TSH SERPL DL<=0.005 MIU/L-ACNC: 0.42 UIU/ML (ref 0.3–4.2)
UROBILINOGEN UR STRIP-ACNC: 0.2 E.U./DL
WBC # BLD AUTO: 7 10E3/UL (ref 4–11)

## 2024-10-07 PROCEDURE — 84443 ASSAY THYROID STIM HORMONE: CPT | Performed by: INTERNAL MEDICINE

## 2024-10-07 PROCEDURE — 90480 ADMN SARSCOV2 VAC 1/ONLY CMP: CPT | Performed by: INTERNAL MEDICINE

## 2024-10-07 PROCEDURE — 85025 COMPLETE CBC W/AUTO DIFF WBC: CPT | Performed by: INTERNAL MEDICINE

## 2024-10-07 PROCEDURE — 36415 COLL VENOUS BLD VENIPUNCTURE: CPT | Performed by: INTERNAL MEDICINE

## 2024-10-07 PROCEDURE — 81003 URINALYSIS AUTO W/O SCOPE: CPT | Mod: QW | Performed by: INTERNAL MEDICINE

## 2024-10-07 PROCEDURE — G2211 COMPLEX E/M VISIT ADD ON: HCPCS | Performed by: INTERNAL MEDICINE

## 2024-10-07 PROCEDURE — 99214 OFFICE O/P EST MOD 30 MIN: CPT | Performed by: INTERNAL MEDICINE

## 2024-10-07 PROCEDURE — 87086 URINE CULTURE/COLONY COUNT: CPT | Performed by: INTERNAL MEDICINE

## 2024-10-07 PROCEDURE — 91320 SARSCV2 VAC 30MCG TRS-SUC IM: CPT | Performed by: INTERNAL MEDICINE

## 2024-10-07 PROCEDURE — 80048 BASIC METABOLIC PNL TOTAL CA: CPT | Performed by: INTERNAL MEDICINE

## 2024-10-07 PROCEDURE — 83036 HEMOGLOBIN GLYCOSYLATED A1C: CPT | Performed by: INTERNAL MEDICINE

## 2024-10-07 ASSESSMENT — PAIN SCALES - GENERAL: PAINLEVEL: SEVERE PAIN (7)

## 2024-10-07 NOTE — PATIENT INSTRUCTIONS
(N18.32) Chronic kidney disease, stage 3b (H)  (primary encounter diagnosis)  Comment: We will check basic metabolic panel today and continues lasix at current dose of 20 mg daily.    Plan: Basic metabolic panel  (Ca, Cl, CO2, Creat,         Gluc, K, Na, BUN)            (I87.2) Venous insufficiency  Comment: Compression stockings will be continued.  Consider assistance with velcroe compression stosckings  Plan: Basic metabolic panel  (Ca, Cl, CO2, Creat,         Gluc, K, Na, BUN)

## 2024-10-07 NOTE — TELEPHONE ENCOUNTER
FUTURE VISIT INFORMATION      SURGERY INFORMATION:  Date: 24  [facility] or  Surgeon:  Mishel Zendejas MD   Anesthesia Type:  choice  Procedure: EXAM UNDER ANESTHESIA, PELVIS, WITH CYSTOSCOPY POSSIBLE TRANSURETHRAL RESECTION BLADDER TUMOR POSSIBLE INTRAVESICAL BOTULINUM TOXIN INJECTION   Consult: virtual visit 10/3/24    RECORDS REQUESTED FROM:       Primary Care Provider: Shola Benoit MD - [facility]    Pertinent Medical History: maxime, hypertension, carotid stenosis, arterial peripheral insufficiency     Most recent EKG+ Tracin24    Most recent ECHO: 23    Most recent Cardiac Stress Test: 23    Most recent Coronary Angiogram: 23

## 2024-10-07 NOTE — PROGRESS NOTES
"Raheem Cochran is a 78 year old, presenting for the following health issues:  Leg Problem and Breathing Problem    History of Present Illness       Reason for visit:  Shoulder pain, SOB and sore on feet   She is taking medications regularly.        Chronic kidney disease, stage 3b (H)  Venous insufficiency  Chasity Hodgson has had occupational therapy and physical therapy coming out to her house once per week, but no further nurse.  Expecting left shoulder to improve over the next few months.   In the interim she is unable to use her left arm to put on her compression stockings.  She is as a result having some more difficulty with left lower extremity venous stasis.  She had some weeping form the left leg over the past weekend, but was able to find a pair of compression stockings that have been helpful.  She she is optimistic that her leg is improving.   She is taking lasix 20 mg daily currently.      Review of Systems  Constitutional - + night sweats, HEENT, cardiovascular, pulmonary, GI + Diabetic gastroparesis, , musculoskeletal, neuro, skin, endocrine and psych systems are negative, except as otherwise noted.      Objective    BP (!) 141/78 (BP Location: Right arm, Patient Position: Sitting, Cuff Size: Adult Regular)   Pulse 75   Temp 98  F (36.7  C) (Tympanic)   Resp 10   Ht 1.6 m (5' 3\")   Wt 94.6 kg (208 lb 9.6 oz)   LMP  (LMP Unknown)   SpO2 99%   BMI 36.95 kg/m    Body mass index is 36.95 kg/m .  Physical Exam   GENERAL: alert and no distress  EYES: Eyes grossly normal to inspection   HENT: ear canals and TM's normal,   NECK: no adenopathy, no asymmetry, masses, or scars  RESP: lungs clear to auscultation - no rales, rhonchi or wheezes  CV: regular rate and rhythm,   ABDOMEN: obese, soft, nontender, sounds normal  MS: no gross musculoskeletal defects noted, no edema  SKIN: no suspicious lesions or rashes  NEURO: Normal strength and tone,   PSYCH: mentation appears normal, affect " normal/bright        Results for orders placed or performed in visit on 10/07/24   Hemoglobin A1c     Status: Abnormal   Result Value Ref Range    Estimated Average Glucose 171 (H) <117 mg/dL    Hemoglobin A1C 7.6 (H) 0.0 - 5.6 %   UA without Microscopic [[id number]]     Status: Abnormal   Result Value Ref Range    Color Urine Yellow Colorless, Straw, Light Yellow, Yellow    Appearance Urine Clear Clear    Glucose Urine >=1000 (A) Negative mg/dL    Bilirubin Urine Negative Negative    Ketones Urine Negative Negative mg/dL    Specific Gravity Urine 1.015 1.003 - 1.035    Blood Urine Negative Negative    pH Urine 5.5 5.0 - 7.0    Protein Albumin Urine Negative Negative mg/dL    Urobilinogen Urine 0.2 0.2, 1.0 E.U./dL    Nitrite Urine Negative Negative    Leukocyte Esterase Urine Negative Negative   CBC with platelets and differential     Status: Abnormal   Result Value Ref Range    WBC Count 7.0 4.0 - 11.0 10e3/uL    RBC Count 3.72 (L) 3.80 - 5.20 10e6/uL    Hemoglobin 11.4 (L) 11.7 - 15.7 g/dL    Hematocrit 34.9 (L) 35.0 - 47.0 %    MCV 94 78 - 100 fL    MCH 30.6 26.5 - 33.0 pg    MCHC 32.7 31.5 - 36.5 g/dL    RDW 12.4 10.0 - 15.0 %    Platelet Count 200 150 - 450 10e3/uL    % Neutrophils 59 %    % Lymphocytes 28 %    % Monocytes 9 %    % Eosinophils 3 %    % Basophils 1 %    % Immature Granulocytes 0 %    Absolute Neutrophils 4.2 1.6 - 8.3 10e3/uL    Absolute Lymphocytes 2.0 0.8 - 5.3 10e3/uL    Absolute Monocytes 0.6 0.0 - 1.3 10e3/uL    Absolute Eosinophils 0.2 0.0 - 0.7 10e3/uL    Absolute Basophils 0.0 0.0 - 0.2 10e3/uL    Absolute Immature Granulocytes 0.0 <=0.4 10e3/uL   CBC with platelets and differential     Status: Abnormal    Narrative    The following orders were created for panel order CBC with platelets and differential.  Procedure                               Abnormality         Status                     ---------                               -----------         ------                     CBC with  platelets and d...[[id number]]  Abnormal            Final result                 Please view results for these tests on the individual orders.        Patient Instructions   (N18.32) Chronic kidney disease, stage 3b (H)  (primary encounter diagnosis)  Comment: We will check basic metabolic panel today and continues lasix at current dose of 20 mg daily.    Plan: Basic metabolic panel  (Ca, Cl, CO2, Creat,         Gluc, K, Na, BUN)            (I87.2) Venous insufficiency  Comment: Compression stockings will be continued.  Consider assistance with velcroe compression stosckings  Plan: Basic metabolic panel  (Ca, Cl, CO2, Creat,         Gluc, K, Na, BUN)                  The longitudinal plan of care for the diagnosis(es)/condition(s) as documented were addressed during this visit. Due to the added complexity in care, I will continue to support Sammie in the subsequent management and with ongoing continuity of care.      Signed Electronically by: Shola Benoit MD, MD

## 2024-10-07 NOTE — RESULT ENCOUNTER NOTE
Gerard Gaspar,    I have had the opportunity to review your recent results and an interpretation is as follows:  Your complete blood counts returned with stable anemia  And your A1c shows a slight increase in average blood glucose, but relatively stable.  Continue follow-up with endocrinology    Sincerely,  Shola Benoit MD

## 2024-10-07 NOTE — TELEPHONE ENCOUNTER
Pre-op and urine culture lab appointment lined up  Purvi Hess, RN, BSN  Care Coordinator Urology  [facility], [place]  Urology [facility]  [phone]

## 2024-10-08 LAB — BACTERIA UR CULT: NO GROWTH

## 2024-10-10 DIAGNOSIS — E78.2 MIXED HYPERLIPIDEMIA: Primary | ICD-10-CM

## 2024-10-10 RX ORDER — ICOSAPENT ETHYL 1 G/1
2 CAPSULE ORAL EVERY EVENING
Qty: 180 CAPSULE | Refills: 3 | Status: SHIPPED | OUTPATIENT
Start: 2024-10-10 | End: 2024-11-13

## 2024-10-11 NOTE — RESULT ENCOUNTER NOTE
Gerard Gaspar,    I have had the opportunity to review your recent results and an interpretation is as follows:  Your thyroid function is stable on your current dose of levothyroxine   Your metabolic panel shows stable renal function and electrolytes    Sincerely,  Shola Benoit MD

## 2024-10-16 ENCOUNTER — MYC MEDICAL ADVICE (OUTPATIENT)
Dept: FAMILY MEDICINE | Facility: CLINIC | Age: 79
End: 2024-10-16
Payer: MEDICARE

## 2024-10-16 DIAGNOSIS — R10.9 FLANK PAIN: ICD-10-CM

## 2024-10-16 RX ORDER — METHOCARBAMOL 500 MG/1
500-1000 TABLET, FILM COATED ORAL 4 TIMES DAILY PRN
Qty: 30 TABLET | Refills: 0 | Status: SHIPPED | OUTPATIENT
Start: 2024-10-16

## 2024-10-16 NOTE — TELEPHONE ENCOUNTER
See below message from pt requesting refill on Methocarbamol, last Rx from non-PCP provider     Floresita ROSE Triage RN  [facility]

## 2024-10-23 ENCOUNTER — MYC MEDICAL ADVICE (OUTPATIENT)
Dept: ENDOCRINOLOGY | Facility: CLINIC | Age: 79
End: 2024-10-23
Payer: MEDICARE

## 2024-10-23 DIAGNOSIS — E11.9 TYPE 2 DIABETES, HBA1C GOAL < 7% (H): ICD-10-CM

## 2024-10-24 ENCOUNTER — ANESTHESIA EVENT (OUTPATIENT)
Dept: SURGERY | Facility: AMBULATORY SURGERY CENTER | Age: 79
End: 2024-10-24
Payer: MEDICARE

## 2024-10-24 ENCOUNTER — PRE VISIT (OUTPATIENT)
Dept: SURGERY | Facility: CLINIC | Age: 79
End: 2024-10-24

## 2024-10-24 ENCOUNTER — LAB (OUTPATIENT)
Dept: LAB | Facility: CLINIC | Age: 79
End: 2024-10-24
Payer: MEDICARE

## 2024-10-24 ENCOUNTER — OFFICE VISIT (OUTPATIENT)
Dept: SURGERY | Facility: CLINIC | Age: 79
End: 2024-10-24
Payer: MEDICARE

## 2024-10-24 VITALS
RESPIRATION RATE: 16 BRPM | TEMPERATURE: 98.3 F | SYSTOLIC BLOOD PRESSURE: 161 MMHG | DIASTOLIC BLOOD PRESSURE: 63 MMHG | WEIGHT: 206.7 LBS | OXYGEN SATURATION: 99 % | HEIGHT: 63 IN | HEART RATE: 59 BPM | BODY MASS INDEX: 36.62 KG/M2

## 2024-10-24 DIAGNOSIS — N32.81 OAB (OVERACTIVE BLADDER): ICD-10-CM

## 2024-10-24 DIAGNOSIS — Z01.818 PRE-OP EVALUATION: Primary | ICD-10-CM

## 2024-10-24 DIAGNOSIS — N39.46 MIXED INCONTINENCE: ICD-10-CM

## 2024-10-24 DIAGNOSIS — N32.89 BLADDER MASS: ICD-10-CM

## 2024-10-24 DIAGNOSIS — N18.32 CHRONIC KIDNEY DISEASE, STAGE 3B (H): Primary | ICD-10-CM

## 2024-10-24 LAB
CREAT UR-MCNC: 67.6 MG/DL
MICROALBUMIN UR-MCNC: <12 MG/L
MICROALBUMIN/CREAT UR: NORMAL MG/G{CREAT}

## 2024-10-24 PROCEDURE — 99000 SPECIMEN HANDLING OFFICE-LAB: CPT | Performed by: PATHOLOGY

## 2024-10-24 PROCEDURE — 82043 UR ALBUMIN QUANTITATIVE: CPT | Performed by: INTERNAL MEDICINE

## 2024-10-24 PROCEDURE — 99215 OFFICE O/P EST HI 40 MIN: CPT | Performed by: PHYSICIAN ASSISTANT

## 2024-10-24 ASSESSMENT — LIFESTYLE VARIABLES: TOBACCO_USE: 0

## 2024-10-24 ASSESSMENT — ENCOUNTER SYMPTOMS: SEIZURES: 0

## 2024-10-24 ASSESSMENT — PAIN SCALES - GENERAL: PAINLEVEL_OUTOF10: SEVERE PAIN (6)

## 2024-10-24 NOTE — H&P
Pre-Operative H & P     CC:  Preoperative exam to assess for increased cardiopulmonary risk while undergoing surgery and anesthesia.    Date of Encounter: 10/24/2024  Primary Care Physician:  Shola Benoit     Reason for visit:   Encounter Diagnoses   Name Primary?    Pre-op evaluation Yes    Bladder mass     OAB (overactive bladder)     Mixed incontinence        HPI  Chasity Hodgson is a 79 year old female who presents for pre-operative H & P in preparation for  Procedure Information       Case: [id number] Date/Time: 11/04/24 1030    Procedures:       EXAM UNDER ANESTHESIA, PELVIS, WITH CYSTOSCOPY (Vagina)      POSSIBLE TRANSURETHRAL RESECTION BLADDER TUMOR (Urethra)      POSSIBLE INTRAVESICAL BOTULINUM TOXIN INJECTION (Bladder)    Anesthesia type: Choice    Diagnosis:       Bladder mass [N32.89]      OAB (overactive bladder) [N32.81]      Mixed incontinence [N39.46]    Pre-op diagnosis:       Bladder mass [N32.89]      OAB (overactive bladder) [N32.81]      Mixed incontinence [N39.46]    Location: [place] [address] / [facility]    Providers: Mishel Zendejas MD              Patient is being evaluated for comorbid conditions of CAD, CRI, allergic rhinitis, diabetes, h/o DVT, GERD, h/o blood transfusion, ANNETTE, h/o thyroid cancer, h/o PE, diabetic gastroparesis, obesity     She has a history of overactive bladder managed with Botox injections, last February 2024. Recently, she underwent an MRI for her back and this showed a possible bladder mass. A subsequent CT scan did not demonstrate an obvious bladder lesion. She completed a follow up visit with Dr. Zednejas on 10/3/24 and is now scheduled for the above procedure for further management.     History is obtained from the patient and chart review    Hx of abnormal bleeding or anti-platelet use: ASA     Menstrual history: No LMP recorded (lmp unknown). Patient has had a hysterectomy.     Past Medical History  Past  Medical History:   Diagnosis Date    Abdominal adhesions 1984, 96,99    s/p lysis    Acne rosacea     Allergic rhinitis     Alopecia     Anemia     CAD (coronary artery disease)     Carotid stenosis     CKD (chronic kidney disease) stage 2, GFR 60-89 ml/min     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)     CPAP (continuous positive airway pressure) dependence     Depression     Diabetic gastroparesis (H)     DM2 (diabetes mellitus, type 2) (H)     DVT of axillary vein, acute right (H)     Fibromyalgia     GERD (gastroesophageal reflux disease)     Hernia of unspecified site of abdominal cavity without mention of obstruction or gangrene     bilateral    History of blood transfusion     10/ 1980    History of thrombophlebitis     HTN (hypertension)     Hypertriglyceridemia     Hypokalemia     Hypothyroidism     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    Mumps     ANNETTE (obstructive sleep apnea)     Osteoarthritis of glenohumeral joint     Papillary carcinoma of thyroid (H)     s/p thyroidectomy - Ruegemer    Poliomyelitis     poor circulation right leg    Postsurgical hypothyroidism     s/p papillary thryoid cancer - Ruegemer    Pulmonary embolism (H)     Pulmonary nodule     S/P carpal tunnel release     bilateral    S/P hardware removal 01/2014    still with lingering foot pain    S/P shoulder surgery     bilateral    Septic joint (H)     right knee    Venous insufficiency        Past Surgical History  Past Surgical History:   Procedure Laterality Date    AMPUTATE TOE(S)  03/15/2012    Procedure:AMPUTATE TOE(S); Surgeon:RUBEN MOSES; Location: OR    AMPUTATE TOE(S)      APPENDECTOMY  1972    APPENDECTOMY      ARTHRODESIS FOOT  03/15/2012    Procedure:ARTHRODESIS FOOT; RIGHT TRIPLE ARTHRODESIS, FIFTH TOE AMPUTATION, LATERAL LIGAMENT RECONSTRUCTION, TENDON TRANSFER AND RELEASE [MINI C-ARM, ARTHREX 4.5 AND 6.7 STAPLES, BIOCOMPOSITE TENODESIS SCREWS]; Surgeon:RUBEN MOSES; Location: OR    ARTHRODESIS FOOT Right      Right foot triple arthrodesis and removal of hardware    ARTHROSCOPY SHOULDER  06/25/2015    REVISION SUBACROMIAL DECOMPRESSION, EXCISION OF GANGLION CYST, DEBRIDEMENT AND EXCISION OF THE GLENOHUMERAL JOINT GANGLION CYST, CORACOID DECOMPRESSION, POSSIBLE SUBSCAPULARIS REPAIR AND OPEN SUBSCAPULARIS BICEP    ARTHROSCOPY SHOULDER ROTATOR CUFF REPAIR      BIOPSY  Thyroid 2002    BLADDER SURGERY      BOTOX INJECTION MEDICAL      BREAST SURGERY  Biopsy    CHOLECYSTECTOMY      CHOLECYSTECTOMY      COLONOSCOPY  2018    COLOSTOMY  02/07/2012    Procedure:COLOSTOMY; CREATION OF SIGMOID COLOSTOMY AND EXTENSIVE  LYSIS OF ADHESIONS; Surgeon:MONTSERRAT BENDER; Location: OR    COLOSTOMY      CV ANGIOGRAM RENAL BILATERAL Bilateral 11/17/2023    Procedure: Angiogram Renal Bilateral;  Surgeon: Ankit Bhatia MD;  Location:  [facility]    CV CORONARY ANGIOGRAM N/A 11/17/2023    Procedure: Coronary Angiogram;  Surgeon: Ankit Bhatia MD;  Location:  [facility]    CYSTOSCOPY      CYSTOSCOPY, INJECT BOTOX N/A 09/18/2023    Procedure: CYSTOSCOPY, WITH BOTULINUM TOXIN INJECTION;  Surgeon: Mishel Zendejas MD;  [place] [facility] [place]    CYSTOSCOPY, INJECT BOTOX N/A 2/20/2024    Procedure: CYSTOSCOPY, WITH BOTULINUM TOXIN INJECTION;  Surgeon: Mishel Zendejas MD;  [place] [facility] [place]    EYE SURGERY      GENITOURINARY SURGERY  1999    GI SURGERY      weakened rectal sphincter with artificial stimulator    HERNIA REPAIR  1976    HYSTERECTOMY TOTAL ABDOMINAL      LAPAROTOMY, LYSIS ADHESIONS, COMBINED  02/07/2012    Procedure:COMBINED LAPAROTOMY, LYSIS ADHESIONS; Surgeon:MONTSERRAT BENDER; Location: OR    RELEASE CARPAL TUNNEL      RELEASE TENDON FOOT  03/15/2012    Procedure:RELEASE TENDON FOOT; Surgeon:RUBEN MOSES; Location: OR    REMOVE HARDWARE FOOT  12/13/2012    Procedure: REMOVE HARDWARE FOOT;  RIGHT FOOT REMOVAL OF HARDWARE;  Surgeon: Ruben Moses MD;   Location: [facility] OR    SHOULDER SURGERY  11/12/2020    LEFT SHOULDER HEMIARHTROPLASTY, BICEP TENODESIS    SOFT TISSUE SURGERY  2018, 2020    TENDON RELEASE      foot    THYROIDECTOMY      ZZC FREEING BOWEL ADHESION,ENTEROLYSIS      1986, 1996, 1999    ZZC NONSPECIFIC PROCEDURE      throidectomy    ZZC TOTAL ABDOM HYSTERECTOMY  1980    + BSO       Prior to Admission Medications  Current Outpatient Medications   Medication Sig Dispense Refill    acetaminophen (TYLENOL) 500 MG tablet Take 1,000 mg by mouth every 8 hours as needed      amLODIPine (NORVASC) 2.5 MG tablet Take 1 tablet (2.5 mg) by mouth daily as needed For blood pressure > 140 90 tablet 3    amoxicillin (AMOXIL) 500 MG capsule Takes 4 caps before every dental appointments.      aspirin (ASA) 81 MG EC tablet Take 81 mg by mouth daily (with lunch)      BIOTIN PO Take 1 capsule by mouth at bedtime Unknown dose      calcium citrate 250 MG TABS Take 1 tablet by mouth at bedtime      carvedilol (COREG) 12.5 MG tablet Take 1 tablet (12.5 mg) by mouth 2 times daily (with meals) 180 tablet 2    clotrimazole (LOTRIMIN) 1 % cream Apply topically as needed (rash/yeast infection)      cyanocobalamin (VITAMIN B-12) 1000 MCG tablet Take 1,000 mcg by mouth every evening      empagliflozin (JARDIANCE) 10 MG TABS tablet Take 1 tablet (10 mg) by mouth every evening. 90 tablet 2    ezetimibe (ZETIA) 10 MG tablet Take 1 tablet (10 mg) by mouth every evening 90 tablet 3    famotidine (PEPCID) 20 MG tablet Take 1 tablet (20 mg) by mouth every evening.      fenofibrate (TRICOR) 145 MG tablet Take 145 mg by mouth at bedtime      fexofenadine (ALLEGRA) 180 MG tablet Take 180 mg by mouth daily (with lunch) Takes in the afternoon 120 0    furosemide (LASIX) 20 MG tablet Take 1 tablet (20 mg) by mouth daily as needed (lower extremity edema).      icosapent ethyl (VASCEPA) 1 g CAPS capsule Take 2 g by mouth every evening. 180 capsule 3    insulin aspart (NOVOLOG PEN) 100 UNIT/ML pen  Inject 10 Units subcutaneously daily (with breakfast) AND 12 Units daily (before supper). 15 mL 4    insulin glargine 100 UNIT/ML pen Inject 20 Units subcutaneously at bedtime. (Patient taking differently: Inject 20 Units subcutaneously every morning.) 15 mL 4    ipratropium (ATROVENT) 0.03 % nasal spray Spray 1 spray in nostril every 8 hours as needed      ketoconazole (NIZORAL) 2 % external cream Apply topically 2 times daily as needed      levothyroxine (SYNTHROID/LEVOTHROID) 112 MCG tablet Take 1 tablet (112 mcg) by mouth daily (Patient taking differently: Take 112 mcg by mouth every morning.) 90 tablet 1    Lidocaine (LIDOCARE) 4 % Patch Place 1 patch onto the skin as needed To prevent lidocaine toxicity, patient should be patch free for 12 hrs daily.      methocarbamol (ROBAXIN) 500 MG tablet Take 1-2 tablets (500-1,000 mg) by mouth 4 times daily as needed for muscle spasms. 30 tablet 0    minoxidil (LONITEN) 2.5 MG tablet Take 0.25 tablets by mouth twice a week On Monday and Thursday      Multiple vitamin TABS Take 1 tablet by mouth daily      nitroGLYcerin (NITROSTAT) 0.4 MG sublingual tablet Place 1 tablet (0.4 mg) under the tongue every 5 minutes as needed for chest pain (no more than 3 in one hour; after 3rd, call 911.) 25 tablet 3    olmesartan (BENICAR) 40 MG tablet Take 1 tablet (40 mg) by mouth daily. (Patient taking differently: Take 40 mg by mouth every morning.) 90 tablet 3    ondansetron (ZOFRAN) 4 MG tablet Take 1 tablet (4 mg) by mouth every 6 hours as needed Reported on 3/20/2017 20 tablet 0    oxyCODONE (ROXICODONE) 5 MG tablet Take 1 tablet (5 mg) by mouth every 4 hours as needed for severe pain (IF pain not managed with non-pharmacological and non-opioid interventions). 10 tablet 0    pantoprazole (PROTONIX) 40 MG EC tablet Take 40 mg by mouth 2 times daily      polyethylene glycol (MIRALAX) 17 g packet Take 17 g by mouth 2 times daily as needed for constipation.      Polyethylene Glycol 400  (BLINK TEARS) 0.25 % GEL Place 1 drop into both eyes at bedtime      senna-docusate (SENOKOT-S/PERICOLACE) 8.6-50 MG tablet Take 1 tablet by mouth daily. (Patient taking differently: Take 1 tablet by mouth daily as needed.)      sucralfate (CARAFATE) 1 GM/10ML suspension Take 1 g by mouth 4 times daily as needed (GERD)      tretinoin (RETIN-A) 0.025 % external cream Apply topically nightly as needed (facial lesions) Use every night as tolerated - spot treat lesion      triamcinolone (KENALOG) 0.025 % external ointment Apply topically 2 times daily as needed for irritation      Vitamin D3 50 mcg (2000 units) tablet Take 1 tablet by mouth every evening Takes in the afternoon      blood glucose (NO BRAND SPECIFIED) test strip Use to test blood sugar 3 times daily or as directed. 200 strip 3    Continuous Glucose Sensor (FREESTYLE BRANDY 3 PLUS SENSOR) MISC 1 each once for 1 dose. Every 15 days 6 each 1       Allergies  Allergies   Allergen Reactions    Ketorolac Tromethamine Difficulty breathing     Shortness of breath to tablets only per the patient    Nsaids Difficulty breathing     Increased creatinine    Celecoxib Itching and Rash    Morphine And Codeine Itching     With higher doses. Pt denies this allergy 11/16/2023    Codeine Itching    Conjugated Estrogens     Crestor [Rosuvastatin] Muscle Pain (Myalgia)    No Clinical Screening - See Comments Itching     Fragrance    Vioxx Other (See Comments)     Heart races    Sulfa Antibiotics Rash       Social History  Social History     Socioeconomic History    Marital status:      Spouse name: Not on file    Number of children: 2    Years of education: Not on file    Highest education level: Not on file   Occupational History    Not on file   Tobacco Use    Smoking status: Never     Passive exposure: Never    Smokeless tobacco: Never   Vaping Use    Vaping status: Never Used   Substance and Sexual Activity    Alcohol use: Never    Drug use: Never    Sexual  activity: Not Currently     Partners: Male     Birth control/protection: Female Surgical   Other Topics Concern    Parent/sibling w/ CABG, MI or angioplasty before 65F 55M? Not Asked     Service Not Asked    Blood Transfusions Not Asked    Caffeine Concern Yes     Comment: occ    Occupational Exposure Not Asked    Hobby Hazards Not Asked    Sleep Concern Yes    Stress Concern Yes    Weight Concern Not Asked    Special Diet Yes     Comment: low carb, low sugar     Back Care Not Asked    Exercise No     Comment: occ. stationary bike     Bike Helmet Not Asked    Seat Belt Yes    Self-Exams Not Asked   Social History Narrative    , 2 children    Retired in medical records at [employer]      Social Drivers of Health     Financial Resource Strain: Low Risk  (11/20/2023)    Financial Resource Strain     Within the past 12 months, have you or your family members you live with been unable to get utilities (heat, electricity) when it was really needed?: No   Food Insecurity: Low Risk  (11/20/2023)    Food Insecurity     Within the past 12 months, did you worry that your food would run out before you got money to buy more?: No     Within the past 12 months, did the food you bought just not last and you didn t have money to get more?: No   Transportation Needs: High Risk (11/20/2023)    Transportation Needs     Within the past 12 months, has lack of transportation kept you from medical appointments, getting your medicines, non-medical meetings or appointments, work, or from getting things that you need?: Yes   Physical Activity: Not on file   Stress: Not on file   Social Connections: Unknown (12/31/2021)    [facility]    Social Connections     Frequency of Communication with Friends and Family: Not on file   Interpersonal Safety: Low Risk  (7/24/2024)    Interpersonal Safety     Do you feel physically and  emotionally safe where you currently live?: Yes     Within the past 12 months, have you been hit, slapped, kicked or otherwise physically hurt by someone?: No     Within the past 12 months, have you been humiliated or emotionally abused in other ways by your partner or ex-partner?: No   Housing Stability: Low Risk  (11/20/2023)    Housing Stability     Do you have housing? : Yes     Are you worried about losing your housing?: No       Family History  Family History   Problem Relation Age of Onset    Arthritis Mother     Hypertension Mother     Cerebrovascular Disease Mother     Obesity Mother     Heart Disease Mother         MI's    Hypertension Father     Respiratory Father         Adult RDS    Diabetes Father     Skin Cancer Sister     Arthritis Sister     Cancer Sister     Diabetes Sister     Hypertension Sister     Breast Cancer Sister     Depression Sister     Thyroid Disease Sister     Obesity Sister     Arthritis Sister     Hypertension Sister     Thyroid Disease Sister     Osteoporosis Sister     Obesity Sister     Cancer Sister         colon polup    Hypertension Sister     Osteoporosis Sister     Obesity Sister     Colon Cancer Sister     Lipids Sister     Obesity Sister     Heart Disease Brother         MI at 54    Other Cancer Brother         Lung & bone    Lipids Brother     Hypertension Brother     Diabetes Brother     Hyperlipidemia Brother     Obesity Maternal Grandmother         Dad s mother    Skin Cancer Maternal Grandmother         skin cancer unknown    Cancer Maternal Grandmother         unknown skin cancer on face    Obesity Paternal Grandmother         Mothers mother    Ovarian Cancer No family hx of     Anesthesia Reaction No family hx of     Deep Vein Thrombosis (DVT) No family hx of        Review of Systems  The complete review of systems is negative other than noted in the HPI or here.   Anesthesia Evaluation   Pt has had prior anesthetic.     No history of anesthetic complications        ROS/MED HX  ENT/Pulmonary:     (+) sleep apnea, mild, doesn't use CPAP,         allergic rhinitis,                          (-) tobacco use, asthma and recent URI   Neurologic: Comment: Right leg paralysis due to polio history    (-) no seizures and no CVA   Cardiovascular:     (+) Dyslipidemia hypertension- -  CAD -  - -   Taking blood thinners                              Previous cardiac testing   Echo: Date: 11/2023 Results:  Interpretation Summary     The left ventricle is normal in size.  Left ventricular systolic function is normal. The visual ejection fraction is  60-65%.  Normal left ventricular wall motion  The aortic valve is trileaflet with aortic valve sclerosis. No hemodynamically  significant valvular aortic stenosis.  The right ventricle is normal in structure, function and size.  Compared to prior study, there is no significant change.    Stress Test:  Date: Results:    ECG Reviewed:  Date: 9/11/24 Results:  NSR  Cath:  Date: 11/2023 Results:  1.  No obstructive coronary artery disease on coronary angiography.  Study essentially unchanged from 2018.  2.  No high-grade proximal or ostial stenosis on bilateral renal angiogram.      METS/Exercise Tolerance: 3 - Able to walk 1-2 blocks without stopping Comment: Some limitations due to right leg paralysis. Uses walker. Some walking, seated exercises. Cleaning around the house    Hematologic:     (+) History of blood clots,    pt is not anticoagulated, anemia, history of blood transfusion, no previous transfusion reaction,        Musculoskeletal: Comment: Bicep tendinopathy, left arm      GI/Hepatic: Comment: Colostomy present     (+) GERD, Asymptomatic on medication,                  Renal/Genitourinary: Comment: Bladder mass  OAB  Mixed incontinence    (+) renal disease, type: CRI,            Endo:     (+)  type II DM, Last HgA1c: 7.6, date: 10/7/24, Using insulin, - not using insulin pump.    thyroid problem, hypothyroidism,    Obesity (BMI 40),   "  (-) chronic steroid usage   Psychiatric/Substance Use:  - neg psychiatric ROS     Infectious Disease:  - neg infectious disease ROS     Malignancy:   (+) Malignancy, History of Other.Other CA thyroid Remission status post Surgery.    Other:            BP (!) 161/63 (BP Location: Right arm, Patient Position: Sitting, Cuff Size: Adult Large)   Pulse 59   Temp 98.3  F (36.8  C) (Oral)   Resp 16   Ht 1.6 m (5' 3\")   Wt 93.8 kg (206 lb 11.2 oz)   LMP  (LMP Unknown)   SpO2 99%   BMI 36.62 kg/m      Physical Exam   Constitutional: Pleasant, well-appearing, no apparent distress, and appears stated age.  Eyes: Pupils equal, round and reactive to light, extra ocular muscles intact, sclera clear, conjunctiva normal.  HENT: Normocephalic and atraumatic, oral pharynx with moist mucus membranes, good dentition. No goiter appreciated.   Respiratory: Clear to auscultation bilaterally, no crackles or wheezing.  Cardiovascular: Regular rate and rhythm, normal S1 and S2, and no murmur noted.  Carotids +2, no bruits. No edema. Palpable pulses to radial  DP and PT arteries.   GI: Non-distended abdomen  Lymph/Hematologic: No cervical lymphadenopathy and no supraclavicular lymphadenopathy.  Genitourinary:  Deferred  Skin: Warm and dry.  No rashes on exposed skin   Musculoskeletal: Full ROM of neck. There is no redness, warmth, or swelling of the visible joints. Gross motor strength is normal.    Neurologic: Awake, alert, oriented to name, place and time. Cranial nerves II-XII are grossly intact.   Neuropsychiatric: Calm, cooperative. Normal affect.         Prior Labs/Diagnostic Studies   All labs and imaging personally reviewed     EKG/ stress test - if available please see in ROS above   Echo result w/o MOPS: Interpretation Summary The left ventricle is normal in size.Left ventricular systolic function is normal. The visual ejection fraction is60-65%.Normal left ventricular wall motionThe aortic valve is trileaflet with aortic " valve sclerosis. No hemodynamicallysignificant valvular aortic stenosis.The right ventricle is normal in structure, function and size.Compared to prior study, there is no significant change.    The patient's records and results personally reviewed by this provider.     Outside records reviewed from: Care Everywhere    LAB/DIAGNOSTIC STUDIES TODAY:  None    Assessment  Chasity Hodgson is a 79 year old female seen as a PAC referral for risk assessment and optimization for anesthesia.    Plan/Recommendations  Pt will be optimized for the proposed procedure.  See below for details on the assessment, risk, and preoperative recommendations    NEUROLOGY  - No history of TIA, CVA or seizure  - History of polio  Has residual right leg paralysis  Patient wears a brace and ambulates with a walker  -Post Op delirium risk factors:  Age    ENT  - No current airway concerns.  Will need to be reassessed day of surgery.  Mallampati: II  TM: > 3    CARDIAC  - History of NSTEMI  S/p angiogram 11/2023 with non-occlusive CAD, unchanged from 2018.   - Hypertension  Managed with Olmesartan, carvedilol, and amlodipine. Hold Olmesartan day of surgery.   - Hyperlipidemia  Well controlled on home regimen    - METS (Metabolic Equivalents)  Patient CANNOT perform 4 METS exercise without symptoms             Total Score: 1    Functional Capacity: Unable to complete 4 METS      RCRI-Moderate risk: Class 3  6.6% complication rate             Total Score: 2    RCRI: CAD    RCRI: Diabetes        PULMONARY  - Obstructive Sleep Apnea  ANNETTE without home CPAP use.    - Denies asthma or inhaler use  - Tobacco History    History   Smoking Status    Never   Smokeless Tobacco    Never       GI  - GERD  Controlled on medications: Carafate and Pantoprazole. Hold Carafate day of surgery.   - S/p partial colectomy with Garner's pouch. Has LLQ colostomy.    PONV High Risk  Total Score: 3           1 AN PONV: Pt is Female    1 AN PONV: Patient is not a current  "smoker    1 AN PONV: Intended Post Op Opioids        /RENAL  - CKD  Baseline Creatinine  1.34-1.61  - OAB, Bladder mass, Mixed incontinence  Above procedure planned for further management    ENDOCRINE    - BMI: Estimated body mass index is 36.62 kg/m  as calculated from the following:    Height as of this encounter: 1.6 m (5' 3\").    Weight as of this encounter: 93.8 kg (206 lb 11.2 oz).  Obesity (BMI >30)  - Diabetes  Hemoglobin A1C (%)   Date Value   10/07/2024 7.6 (H)   07/07/2021 8.7 (H)     Diabetes Type 2, insulin dependent. Hold morning oral hypoglycemic medications and short acting insulin DOS. Take 80% of last scheduled dose of long-acting insulin prior to procedure.  Recommend close monitoring of the patient's blood glucose levels throughout the perioperative period.  - Thyroid disorder  Continue home replacement while hospitalized.    HEME  VTE Low Risk 0.5%             Total Score: 4    VTE: Greater than 59 yrs old    VTE: Pt history of VTE      - No history of abnormal bleeding or antiplatelet use.  - Chronic anemia  Baseline Hgb: 10.9-11.6  Recommend perioperative use of blood conservation techniques intraoperatively and close monitoring for postoperative bleeding.      MSK  Patient is NOT Frail             Total Score: 2    Frailty: Slower walking speed    Frailty: Decrease in strength      - Left bicep tendinopathy  Recommend consideration for careful positioning to limit patient discomfort.      OTHER  - History of difficult IV access    Different anesthesia methods/types have been discussed with the patient, but they are aware that the final plan will be decided by the assigned anesthesia provider on the date of service.    The patient is optimized for their procedure. AVS with information on surgery time/arrival time, meds and NPO status given by nursing staff. No further diagnostic testing indicated.      On the day of service:     Prep time: 10 minutes  Visit time: 20 minutes  Documentation " time: 15 minutes  ------------------------------------------  Total time: 45 minutes      Jessica Felder PA-C  Preoperative Assessment Center  [facility]  Phone: [phone]  Fax: [phone]

## 2024-10-24 NOTE — PATIENT INSTRUCTIONS
Preparing for Your Surgery      Name:  Chasity Hodgson   MRN:  [id number]   :  1945   Today's Date:  10/24/2024         Arriving for surgery:  Surgery date:  24  Arrival time:  9.00AM    Restrictions due to COVID 19:    Please maintain social distance.  Masking is optional.      parking is available for anyone with mobility limitations or disabilities. (Monday- Friday 7 am- 5 pm)    Please come to:    [facility]  [address] [place] [phone]    Please check in on the 5th floor at the Ambulatory Surgery Center.      What can I eat or drink?    -  You may eat and drink normally until 8 hours prior to arrival  time. (Until 1.00AM)  -  You may have clear liquids until 2 hours prior to arrival  time. (Until 7.00AM)    Examples of clear liquids:  Water  Clear broth  Juices (apple, white grape, white cranberry  and cider) without pulp  Noncarbonated, powder based beverages  (lemonade and Manny-Aid)  Sodas (Sprite, 7-Up, ginger ale and seltzer)  Coffee or tea (without milk or cream)  Gatorade      Which medicines can I take?    Hold Aspirin for 7 days before surgery.   Hold Multivitamins for 7 days before surgery.  Hold Supplements for 7 days before surgery. (Vascepa, Biotin)  Hold Ibuprofen (Advil, Motrin) for 1 day before surgery--unless otherwise directed by surgeon.  Hold Naproxen (Aleve) for 4 days before surgery.    No alcohol or cannabis products for 24 hours prior to procedure.    Hold Jardiance for 3 days before surgery.  Take only 16 units of insulin Glargine (Lantus) the morning of your surgery.      -  DO NOT take the following medications the day of surgery:  Continue to hold Aspirin, Vitamins and supplements  Continue to hold Jardiance  Calcium Caltrate  External cream  Vitamin B12  Ezetimibe (Zetia) (Okay to take in the evening, just not the morning of surgery)  Fenofibrate (Tricor) (Okay to take in the evening, just not the morning of  surgery)  Furosemide (Lasix)  Short acting insulin  Lidocaine patch   Olmesartan (Benicar)  Miralax  Senna-docusate  Sulcrafate (Carafate)  Vitamin D3        -  PLEASE TAKE the following medications the day of surgery:   Tylenol as needed  Amlodipine (Norvasc)  Carvedilol (Coreg)  Famotidine (Pepcid)  Ipratropium nasal spray as needed  Levothyroxine (Synthroid)  Methocarbamol (Robaxin) as needed  Nitrostat as needed  Zofran as needed  Oxycodone as needed  Pantoprazole (Protonix)          How do I prepare myself?  - Please take 2 showers (one the night prior to surgery and one the morning of surgery) using Scrubcare or Hibiclens soap.    Use this soap only from the neck to your toes.     Leave the soap on your skin for one minute--then rinse thoroughly.      You may use your own shampoo and conditioner. No other hair products.   - Please remove all jewelry and body piercings.  - No lotions, deodorants or fragrance.  - No makeup or fingernail polish.   - Bring your ID and insurance card.    -If you have a Deep Brain Stimulator, a Spinal Cord Stimulator, or any implanted Neuro Device, you must bring the remote to the Surgery Center.         ALL PATIENTS ARE REQUIRED TO HAVE A RESPONSIBLE ADULT TO DRIVE AND BE IN ATTENDANCE WITH THEM FOR 24 HOURS FOLLOWING SURGERY.     Covid testing policy as of 12/06/2022  Your surgeon will notify and schedule you for a COVID test if one is needed before surgery--please direct any questions or COVID symptoms to your surgeon      Questions or Concerns:    -For questions regarding the day of surgery, please contact the Ambulatory Surgery Center at [phone].    -If you have health changes between today and your surgery, please contact your surgeon.     - For questions after surgery, please contact your surgeon's office.

## 2024-10-31 ENCOUNTER — HOSPITAL ENCOUNTER (OUTPATIENT)
Dept: ULTRASOUND IMAGING | Facility: CLINIC | Age: 79
Discharge: HOME | End: 2024-10-31
Attending: UROLOGY | Admitting: UROLOGY
Payer: MEDICARE

## 2024-10-31 DIAGNOSIS — N32.89 BLADDER MASS: ICD-10-CM

## 2024-10-31 PROCEDURE — 76857 US EXAM PELVIC LIMITED: CPT

## 2024-11-04 ENCOUNTER — HOSPITAL ENCOUNTER (OUTPATIENT)
Facility: AMBULATORY SURGERY CENTER | Age: 79
Discharge: HOME | End: 2024-11-04
Attending: UROLOGY
Payer: MEDICARE

## 2024-11-04 ENCOUNTER — ANESTHESIA (OUTPATIENT)
Dept: SURGERY | Facility: AMBULATORY SURGERY CENTER | Age: 79
End: 2024-11-04
Payer: MEDICARE

## 2024-11-04 VITALS
SYSTOLIC BLOOD PRESSURE: 109 MMHG | HEART RATE: 74 BPM | RESPIRATION RATE: 16 BRPM | OXYGEN SATURATION: 100 % | DIASTOLIC BLOOD PRESSURE: 50 MMHG | TEMPERATURE: 97.4 F | HEIGHT: 60 IN | BODY MASS INDEX: 39.85 KG/M2 | WEIGHT: 203 LBS

## 2024-11-04 LAB
GLUCOSE BLDC GLUCOMTR-MCNC: 151 MG/DL (ref 70–99)
GLUCOSE BLDC GLUCOMTR-MCNC: 161 MG/DL (ref 70–99)

## 2024-11-04 PROCEDURE — 52224 CYSTOSCOPY AND TREATMENT: CPT | Performed by: NURSE ANESTHETIST, CERTIFIED REGISTERED

## 2024-11-04 PROCEDURE — 82962 GLUCOSE BLOOD TEST: CPT | Performed by: PATHOLOGY

## 2024-11-04 PROCEDURE — 52287 CYSTOSCOPY CHEMODENERVATION: CPT | Mod: GC | Performed by: UROLOGY

## 2024-11-04 PROCEDURE — 52287 CYSTOSCOPY CHEMODENERVATION: CPT

## 2024-11-04 PROCEDURE — 52224 CYSTOSCOPY AND TREATMENT: CPT | Performed by: ANESTHESIOLOGY

## 2024-11-04 PROCEDURE — 99100 ANES PT EXTEME AGE<1 YR&>70: CPT | Performed by: ANESTHESIOLOGY

## 2024-11-04 PROCEDURE — 99100 ANES PT EXTEME AGE<1 YR&>70: CPT | Performed by: NURSE ANESTHETIST, CERTIFIED REGISTERED

## 2024-11-04 RX ORDER — DEXAMETHASONE SODIUM PHOSPHATE 10 MG/ML
4 INJECTION, SOLUTION INTRAMUSCULAR; INTRAVENOUS
Status: DISCONTINUED | OUTPATIENT
Start: 2024-11-04 | End: 2024-11-05 | Stop reason: HOSPADM

## 2024-11-04 RX ORDER — ACETAMINOPHEN 325 MG/1
975 TABLET ORAL ONCE
Status: COMPLETED | OUTPATIENT
Start: 2024-11-04 | End: 2024-11-04

## 2024-11-04 RX ORDER — FENTANYL CITRATE 50 UG/ML
50 INJECTION, SOLUTION INTRAMUSCULAR; INTRAVENOUS EVERY 5 MIN PRN
Status: DISCONTINUED | OUTPATIENT
Start: 2024-11-04 | End: 2024-11-05 | Stop reason: HOSPADM

## 2024-11-04 RX ORDER — CEFAZOLIN SODIUM 2 G/50ML
2 SOLUTION INTRAVENOUS
Status: COMPLETED | OUTPATIENT
Start: 2024-11-04 | End: 2024-11-04

## 2024-11-04 RX ORDER — HYDROXYZINE HYDROCHLORIDE 10 MG/1
10 TABLET, FILM COATED ORAL EVERY 6 HOURS PRN
Status: DISCONTINUED | OUTPATIENT
Start: 2024-11-04 | End: 2024-11-05 | Stop reason: HOSPADM

## 2024-11-04 RX ORDER — OXYCODONE HYDROCHLORIDE 5 MG/1
5 TABLET ORAL
Status: DISCONTINUED | OUTPATIENT
Start: 2024-11-04 | End: 2024-11-05 | Stop reason: HOSPADM

## 2024-11-04 RX ORDER — ONDANSETRON 4 MG/1
4 TABLET, ORALLY DISINTEGRATING ORAL EVERY 30 MIN PRN
Status: DISCONTINUED | OUTPATIENT
Start: 2024-11-04 | End: 2024-11-05 | Stop reason: HOSPADM

## 2024-11-04 RX ORDER — CEFAZOLIN SODIUM 2 G/50ML
2 SOLUTION INTRAVENOUS SEE ADMIN INSTRUCTIONS
Status: DISCONTINUED | OUTPATIENT
Start: 2024-11-04 | End: 2024-11-04 | Stop reason: HOSPADM

## 2024-11-04 RX ORDER — FENTANYL CITRATE 50 UG/ML
25 INJECTION, SOLUTION INTRAMUSCULAR; INTRAVENOUS EVERY 5 MIN PRN
Status: DISCONTINUED | OUTPATIENT
Start: 2024-11-04 | End: 2024-11-05 | Stop reason: HOSPADM

## 2024-11-04 RX ORDER — NALOXONE HYDROCHLORIDE 0.4 MG/ML
0.1 INJECTION, SOLUTION INTRAMUSCULAR; INTRAVENOUS; SUBCUTANEOUS
Status: DISCONTINUED | OUTPATIENT
Start: 2024-11-04 | End: 2024-11-05 | Stop reason: HOSPADM

## 2024-11-04 RX ORDER — HYDROMORPHONE HYDROCHLORIDE 1 MG/ML
0.2 INJECTION, SOLUTION INTRAMUSCULAR; INTRAVENOUS; SUBCUTANEOUS EVERY 5 MIN PRN
Status: DISCONTINUED | OUTPATIENT
Start: 2024-11-04 | End: 2024-11-05 | Stop reason: HOSPADM

## 2024-11-04 RX ORDER — HYDROMORPHONE HYDROCHLORIDE 1 MG/ML
0.4 INJECTION, SOLUTION INTRAMUSCULAR; INTRAVENOUS; SUBCUTANEOUS EVERY 5 MIN PRN
Status: DISCONTINUED | OUTPATIENT
Start: 2024-11-04 | End: 2024-11-05 | Stop reason: HOSPADM

## 2024-11-04 RX ORDER — ONDANSETRON 2 MG/ML
4 INJECTION INTRAMUSCULAR; INTRAVENOUS EVERY 30 MIN PRN
Status: DISCONTINUED | OUTPATIENT
Start: 2024-11-04 | End: 2024-11-05 | Stop reason: HOSPADM

## 2024-11-04 RX ORDER — SODIUM CHLORIDE, SODIUM LACTATE, POTASSIUM CHLORIDE, CALCIUM CHLORIDE 600; 310; 30; 20 MG/100ML; MG/100ML; MG/100ML; MG/100ML
INJECTION, SOLUTION INTRAVENOUS CONTINUOUS
Status: DISCONTINUED | OUTPATIENT
Start: 2024-11-04 | End: 2024-11-04 | Stop reason: HOSPADM

## 2024-11-04 RX ORDER — OXYCODONE HYDROCHLORIDE 5 MG/1
10 TABLET ORAL
Status: DISCONTINUED | OUTPATIENT
Start: 2024-11-04 | End: 2024-11-05 | Stop reason: HOSPADM

## 2024-11-04 RX ORDER — FENTANYL CITRATE 50 UG/ML
25 INJECTION, SOLUTION INTRAMUSCULAR; INTRAVENOUS
Status: DISCONTINUED | OUTPATIENT
Start: 2024-11-04 | End: 2024-11-05 | Stop reason: HOSPADM

## 2024-11-04 RX ORDER — LIDOCAINE HYDROCHLORIDE 20 MG/ML
INJECTION, SOLUTION INFILTRATION; PERINEURAL PRN
Status: DISCONTINUED | OUTPATIENT
Start: 2024-11-04 | End: 2024-11-04

## 2024-11-04 RX ORDER — LIDOCAINE HYDROCHLORIDE 20 MG/ML
JELLY TOPICAL PRN
Status: DISCONTINUED | OUTPATIENT
Start: 2024-11-04 | End: 2024-11-04 | Stop reason: HOSPADM

## 2024-11-04 RX ORDER — SODIUM CHLORIDE, SODIUM LACTATE, POTASSIUM CHLORIDE, CALCIUM CHLORIDE 600; 310; 30; 20 MG/100ML; MG/100ML; MG/100ML; MG/100ML
INJECTION, SOLUTION INTRAVENOUS CONTINUOUS
Status: DISCONTINUED | OUTPATIENT
Start: 2024-11-04 | End: 2024-11-05 | Stop reason: HOSPADM

## 2024-11-04 RX ORDER — LABETALOL HYDROCHLORIDE 5 MG/ML
10 INJECTION, SOLUTION INTRAVENOUS
Status: DISCONTINUED | OUTPATIENT
Start: 2024-11-04 | End: 2024-11-05 | Stop reason: HOSPADM

## 2024-11-04 RX ORDER — LIDOCAINE 40 MG/G
CREAM TOPICAL
Status: DISCONTINUED | OUTPATIENT
Start: 2024-11-04 | End: 2024-11-04 | Stop reason: HOSPADM

## 2024-11-04 RX ORDER — FENTANYL CITRATE 50 UG/ML
INJECTION, SOLUTION INTRAMUSCULAR; INTRAVENOUS PRN
Status: DISCONTINUED | OUTPATIENT
Start: 2024-11-04 | End: 2024-11-04

## 2024-11-04 RX ORDER — ACETAMINOPHEN 650 MG/1
650 SUPPOSITORY RECTAL ONCE
Status: COMPLETED | OUTPATIENT
Start: 2024-11-04 | End: 2024-11-04

## 2024-11-04 RX ORDER — ONDANSETRON 2 MG/ML
INJECTION INTRAMUSCULAR; INTRAVENOUS PRN
Status: DISCONTINUED | OUTPATIENT
Start: 2024-11-04 | End: 2024-11-04

## 2024-11-04 RX ORDER — SODIUM CHLORIDE 9 MG/ML
INJECTION, SOLUTION INTRAVENOUS CONTINUOUS
Status: DISCONTINUED | OUTPATIENT
Start: 2024-11-04 | End: 2024-11-04 | Stop reason: HOSPADM

## 2024-11-04 RX ORDER — MEPERIDINE HYDROCHLORIDE 25 MG/ML
12.5 INJECTION INTRAMUSCULAR; INTRAVENOUS; SUBCUTANEOUS EVERY 5 MIN PRN
Status: DISCONTINUED | OUTPATIENT
Start: 2024-11-04 | End: 2024-11-05 | Stop reason: HOSPADM

## 2024-11-04 RX ORDER — PROPOFOL 10 MG/ML
INJECTION, EMULSION INTRAVENOUS CONTINUOUS PRN
Status: DISCONTINUED | OUTPATIENT
Start: 2024-11-04 | End: 2024-11-04

## 2024-11-04 RX ADMIN — ACETAMINOPHEN 975 MG: 325 TABLET ORAL at 10:53

## 2024-11-04 RX ADMIN — FENTANYL CITRATE 25 MCG: 50 INJECTION, SOLUTION INTRAMUSCULAR; INTRAVENOUS at 09:58

## 2024-11-04 RX ADMIN — PROPOFOL 150 MCG/KG/MIN: 10 INJECTION, EMULSION INTRAVENOUS at 09:58

## 2024-11-04 RX ADMIN — SODIUM CHLORIDE: 9 INJECTION, SOLUTION INTRAVENOUS at 09:02

## 2024-11-04 RX ADMIN — ONDANSETRON 4 MG: 2 INJECTION INTRAMUSCULAR; INTRAVENOUS at 09:58

## 2024-11-04 RX ADMIN — LIDOCAINE HYDROCHLORIDE 40 MG: 20 INJECTION, SOLUTION INFILTRATION; PERINEURAL at 09:58

## 2024-11-04 RX ADMIN — CEFAZOLIN SODIUM 2 G: 2 SOLUTION INTRAVENOUS at 09:56

## 2024-11-04 NOTE — ANESTHESIA POSTPROCEDURE EVALUATION
Patient: Chasity Hodgson    Procedure: Procedure(s):  EXAM UNDER ANESTHESIA, PELVIS, WITH CYSTOSCOPY  INTRAVESICAL BOTULINUM TOXIN INJECTION       Anesthesia Type:  MAC    Note:  Disposition: Outpatient   Postop Pain Control: Uneventful            Sign Out: Well controlled pain   PONV: No   Neuro/Psych: Uneventful            Sign Out: Acceptable/Baseline neuro status   Airway/Respiratory: Uneventful            Sign Out: Acceptable/Baseline resp. status   CV/Hemodynamics: Uneventful            Sign Out: Acceptable CV status; No obvious hypovolemia; No obvious fluid overload   Other NRE: NONE   DID A NON-ROUTINE EVENT OCCUR?            Last vitals:  Vitals Value Taken Time   BP 92/36 11/04/24 1029   Temp 36.3  C (97.3  F) 11/04/24 1029   Pulse 70 11/04/24 1029   Resp 16 11/04/24 1029   SpO2 97 % 11/04/24 1029       Electronically Signed By: Sohail Hopkins MD  November 4, 2024  10:49 AM

## 2024-11-04 NOTE — OP NOTE
OPERATIVE REPORT  November 5, 2024      PREOPERATIVE DIAGNOSIS:   Overactive bladder  Mixed incontinence  Bladder mass  Coronary artery disease  Obstructive sleep apnea  Diabetes  Gastroparesis    POSTOPERATIVE DIAGNOSIS:  Overactive bladder  Mixed incontinence  NO Bladder mass-evidence of urethral bulking  Coronary artery disease  Obstructive sleep apnea  Diabetes  Gastroparesis    PROCEDURES PERFORMED:   1. Cystourethroscopy with injection of botulinum toxin A 200 units in 20 mL sterile saline    STAFF SURGEON: Dr. Mishel Zendejas MD    RESIDENT(S):  Garrett Myers MD    ANESTHESIA: MAC    ESTIMATED BLOOD LOSS: <5 mL.     DRAINS: none    SIGNIFICANT FINDINGS:  1. No bladder mass seen, 200 unit(s) botox injected    OPERATIVE INDICATIONS:   Chasity Hodgson is a(n) 79 year old female with a history of overactive bladder not improved with medications or PT. She also had an MRI and US with question of bladder mass. The patient was counseled on the alternatives, risks, and benefits and elected to proceed with the above stated procedure.    DESCRIPTION OF PROCEDURE:   After verification of informed consent was obtained, the patient was brought to the operating room, and moved to the operating table. After adequate anesthesia was induced, the patient was repositioned in the lithotomy position in supportive yellowfin stirrups with care in neural safety in positioning in all four extremities.  She was then prepped and draped in the usual sterile fashion. A formal timeout was performed to confirm the correct patient, procedure and operative site.     A 19fr cystoscope was inserted into a well lubricated urethra. The urethra was notable for prior macroplastique seen in the mucosa. A cystoscopy was performed which showed no bladder mass, moderate trabeculation, some diverticulum. In the posterior bladder base there was evidence of prior urethral bulking    The scope was removed and a 21-Ecuadorean Marsh injection cystoscope was placed  into the bladder.   The ureteral orifices were in the normal orthotopic position bilaterally.  We mixed 2 vials of 100 U botulinum toxin A into 20 mL of injectable saline for a total of (10 units/mL).  We performed a template injection procedure with 5 columns of 4 injections. All injections were placed deep to the mucosa into the detrusor muscle.  We then emptied her bladder to re-inspect our injection sites and found that there was a minimal amount of blood. Her bladder was then emptied again. The patient was returned to supine position and transported to recovery in stable condition.    The procedure was then concluded. The patient was transferred to the postanesthesia care unit in stable condition and tolerated the procedure well.    POSTOP PLAN:  1. Void prior to discharge with PVR < 150mL. Discharge when meets criteria     Addendum:    I, Mishel Zendejas, was present for the entire case.  I agree with the note as above with changes made as needed. I spoke to her daughter over the phone at the end of the case    Mishel Zendejas MD MPH  (she/her/hers)   of Urology  [facility]

## 2024-11-04 NOTE — DISCHARGE INSTRUCTIONS
[facility]  Home Care Following Anesthesia  For 24 hours after surgery:  Get plenty of rest.  A responsible adult must stay with you for at least 24 hours after you leave the surgery center.  Do not drive or use heavy equipment.  If you have weakness or tingling, don't drive or use heavy equipment until this feeling goes away.   Do not drink alcohol.   Avoid strenuous or risky activities.  Ask for help when climbing stairs.  You may feel lightheaded.  IF so, sit for a few minutes before standing.  Have someone help you get up.   If you have nausea (feel sick to your stomach): Drink only clear liquids such as apple juice, ginger ale, broth or 7-Up.  Rest may also help.  Be sure to drink enough fluids.  Move to a regular diet as you feel able.   You may have a slight fever.  Call the doctor if your fever is over 100 F (37.7 C) (taken under the tongue) or lasts longer than 24 hours.  You may have a dry mouth, a sore throat, muscle aches or trouble sleeping. These should go away after 24 hours.  Do not make important or legal decisions.   It is recommended to avoid smoking.               Tips for taking pain medications  To get the best pain relief possible, remember these points:  Take pain medications as directed, before pain becomes severe.  Pain medication can upset your stomach: taking it with food may help.  Constipation is a common side effect of pain medication. Drink plenty of  fluids.  Eat foods high in fiber. Take a stool softener if recommended by your doctor or pharmacist.  Do not drink alcohol, drive or operate machinery while taking pain medications.  Ask about other ways to control pain, such as with heat, ice or relaxation.    Tylenol/Acetaminophen Consumption    If you feel your pain relief is insufficient, you may take Tylenol/Acetaminophen in addition to your narcotic pain medication.   Be careful not to exceed 4,000 mg of Tylenol/Acetaminophen in a 24 hour  period from all sources.  If you are taking extra strength Tylenol/acetaminophen (500 mg), the maximum dose is 8 tablets in 24 hours.  If you are taking regular strength acetaminophen (325 mg), the maximum dose is 12 tablets in 24 hours.    Tylenol 975 mg given at 10:55 AM.  Ok to take more after 4:55 PM.       Call a doctor for any of the following:  Signs of infection (fever, growing tenderness at the surgery site, a large amount of drainage or bleeding, severe pain, foul-smelling drainage, redness, swelling).  It has been over 8 to 10 hours since surgery and you are still not able to urinate (pass water).  Headache for over 24 hours.  Signs of Covid-19 infection (temperature over 100 degrees, shortness of breath, cough, loss of taste/smell, generalized body aches, persistent headache, chills, sore throat, nausea/vomiting/diarrhea)  Your doctor is:  Dr. Mishel Zendejas, Prostate and Urology: [phone]  Or dial [phone] and ask for the resident on call for:  Prostate Urology  For emergency care, call the:  [facility]:  [phone] (TTY for hearing impaired: [phone])

## 2024-11-04 NOTE — ANESTHESIA CARE TRANSFER NOTE
Patient: Chasity Hodgson    Procedure: Procedure(s):  EXAM UNDER ANESTHESIA, PELVIS, WITH CYSTOSCOPY  INTRAVESICAL BOTULINUM TOXIN INJECTION       Diagnosis: Bladder mass [N32.89]  OAB (overactive bladder) [N32.81]  Mixed incontinence [N39.46]  Diagnosis Additional Information: No value filed.    Anesthesia Type:   MAC     Note:    Oropharynx: oropharynx clear of all foreign objects  Level of Consciousness: awake  Oxygen Supplementation: room air    Independent Airway: airway patency satisfactory and stable  Dentition: dentition unchanged  Vital Signs Stable: post-procedure vital signs reviewed and stable  Report to RN Given: handoff report given  Patient transferred to: Phase II    Handoff Report: Identifed the Patient, Identified the Reponsible Provider, Reviewed the pertinent medical history, Discussed the surgical course, Reviewed Intra-OP anesthesia mangement and issues during anesthesia, Set expectations for post-procedure period and Allowed opportunity for questions and acknowledgement of understanding      Vitals:  Vitals Value Taken Time   BP     Temp     Pulse     Resp     SpO2         Electronically Signed By: MAGALY Rocha CRNA  November 4, 2024  10:30 AM

## 2024-11-04 NOTE — ANESTHESIA PREPROCEDURE EVALUATION
Anesthesia Pre-Procedure Evaluation    Patient: Chasity Hodgson   MRN: [id number] : 1945        Procedure : Procedure(s):  EXAM UNDER ANESTHESIA, PELVIS, WITH CYSTOSCOPY, POSSIBLE TRANSURETHRAL RESECTION BLADDER TUMOR  POSSIBLE INTRAVESICAL BOTULINUM TOXIN INJECTION          Past Medical History:   Diagnosis Date    Abdominal adhesions , ,    s/p lysis    Acne rosacea     Allergic rhinitis     Alopecia     Anemia     CAD (coronary artery disease)     Carotid stenosis     CKD (chronic kidney disease) stage 2, GFR 60-89 ml/min     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)     CPAP (continuous positive airway pressure) dependence     Depression     Diabetic gastroparesis (H)     DM2 (diabetes mellitus, type 2) (H)     DVT of axillary vein, acute right (H)     Fibromyalgia     GERD (gastroesophageal reflux disease)     Hernia of unspecified site of abdominal cavity without mention of obstruction or gangrene     bilateral    History of blood transfusion     10/ 1980    History of thrombophlebitis     HTN (hypertension)     Hypertriglyceridemia     Hypokalemia     Hypothyroidism     Mild major depression (H) 2019    Mumps     ANNETTE (obstructive sleep apnea)     Osteoarthritis of glenohumeral joint     Papillary carcinoma of thyroid (H)     s/p thyroidectomy - Ruegemer    Poliomyelitis     poor circulation right leg    Postsurgical hypothyroidism     s/p papillary thryoid cancer - Ruegemer    Pulmonary embolism (H)     Pulmonary nodule     S/P carpal tunnel release     bilateral    S/P hardware removal 2014    still with lingering foot pain    S/P shoulder surgery     bilateral    Septic joint (H)     right knee    Venous insufficiency       Past Surgical History:   Procedure Laterality Date    AMPUTATE TOE(S)  03/15/2012    Procedure:AMPUTATE TOE(S); Surgeon:RUBEN MOSES; Location:[facility] OR    AMPUTATE TOE(S)      APPENDECTOMY  1972    APPENDECTOMY      ARTHRODESIS FOOT  03/15/2012    Procedure:ARTHRODESIS  FOOT; RIGHT TRIPLE ARTHRODESIS, FIFTH TOE AMPUTATION, LATERAL LIGAMENT RECONSTRUCTION, TENDON TRANSFER AND RELEASE [MINI C-ARM, ARTHREX 4.5 AND 6.7 STAPLES, BIOCOMPOSITE TENODESIS SCREWS]; Surgeon:RUBEN MOSES; Location: OR    ARTHRODESIS FOOT Right     Right foot triple arthrodesis and removal of hardware    ARTHROSCOPY SHOULDER  06/25/2015    REVISION SUBACROMIAL DECOMPRESSION, EXCISION OF GANGLION CYST, DEBRIDEMENT AND EXCISION OF THE GLENOHUMERAL JOINT GANGLION CYST, CORACOID DECOMPRESSION, POSSIBLE SUBSCAPULARIS REPAIR AND OPEN SUBSCAPULARIS BICEP    ARTHROSCOPY SHOULDER ROTATOR CUFF REPAIR      BIOPSY  Thyroid 2002    BLADDER SURGERY      BOTOX INJECTION MEDICAL      BREAST SURGERY  Biopsy    CHOLECYSTECTOMY      CHOLECYSTECTOMY      COLONOSCOPY  2018    COLOSTOMY  02/07/2012    Procedure:COLOSTOMY; CREATION OF SIGMOID COLOSTOMY AND EXTENSIVE  LYSIS OF ADHESIONS; Surgeon:MONTSERRAT BENDER; [facility]    COLOSTOMY      CV ANGIOGRAM RENAL BILATERAL Bilateral 11/17/2023    Procedure: Angiogram Renal Bilateral;  Surgeon: Ankit Bhatia MD;  Location:  [facility]    CV CORONARY ANGIOGRAM N/A 11/17/2023    Procedure: Coronary Angiogram;  Surgeon: Ankit Bhatia MD;  Location:  [facility]    CYSTOSCOPY      CYSTOSCOPY, INJECT BOTOX N/A 09/18/2023    Procedure: CYSTOSCOPY, WITH BOTULINUM TOXIN INJECTION;  Surgeon: Mishel Zendejas MD;  [place] [facility]    CYSTOSCOPY, INJECT BOTOX N/A 2/20/2024    Procedure: CYSTOSCOPY, WITH BOTULINUM TOXIN INJECTION;  Surgeon: Mishel Zendejas MD;  [place] [facility]    EYE SURGERY      GENITOURINARY SURGERY  1999    GI SURGERY      weakened rectal sphincter with artificial stimulator    HERNIA REPAIR  1976    HYSTERECTOMY TOTAL ABDOMINAL      LAPAROTOMY, LYSIS ADHESIONS, COMBINED  02/07/2012    Procedure:COMBINED LAPAROTOMY, LYSIS ADHESIONS; Surgeon:MONTSERRAT BENDER; Location: OR    RELEASE CARPAL TUNNEL       RELEASE TENDON FOOT  03/15/2012    Procedure:RELEASE TENDON FOOT; Surgeon:SUKHDEEP METZ; Location:[facility]    REMOVE HARDWARE FOOT  12/13/2012    Procedure: REMOVE HARDWARE FOOT;  RIGHT FOOT REMOVAL OF HARDWARE;  Surgeon: Sukhdeep Metz MD;  Location:  OR    SHOULDER SURGERY  11/12/2020    LEFT SHOULDER HEMIARHTROPLASTY, BICEP TENODESIS    SOFT TISSUE SURGERY  2018, 2020    TENDON RELEASE      foot    THYROIDECTOMY      ZZC FREEING BOWEL ADHESION,ENTEROLYSIS      1986, 1996, 1999    ZZC NONSPECIFIC PROCEDURE      throidectomy    ZZC TOTAL ABDOM HYSTERECTOMY  1980    + BSO      Allergies   Allergen Reactions    Ketorolac Tromethamine Difficulty breathing     Shortness of breath to tablets only per the patient    Nsaids Difficulty breathing     Increased creatinine    Celecoxib Itching and Rash    Morphine And Codeine Itching     With higher doses. Pt denies this allergy 11/16/2023    Codeine Itching    Conjugated Estrogens     Crestor [Rosuvastatin] Muscle Pain (Myalgia)    No Clinical Screening - See Comments Itching     Fragrance    Vioxx Other (See Comments)     Heart races    Sulfa Antibiotics Rash      Social History     Tobacco Use    Smoking status: Never     Passive exposure: Never    Smokeless tobacco: Never   Substance Use Topics    Alcohol use: Never      Wt Readings from Last 1 Encounters:   11/04/24 92.1 kg (203 lb)           Physical Exam    Airway        Mallampati: II   TM distance: > 3 FB   Neck ROM: full   Mouth opening: > 3 cm    Respiratory Devices and Support         Dental       (+) Modest Abnormalities - crowns, retainers, 1 or 2 missing teeth      Cardiovascular   cardiovascular exam normal          Pulmonary   pulmonary exam normal                OUTSIDE LABS:  CBC:   Lab Results   Component Value Date    WBC 7.0 10/07/2024    WBC 6.5 09/11/2024    HGB 11.4 (L) 10/07/2024    HGB 11.6 (L) 09/11/2024    HCT 34.9 (L) 10/07/2024    HCT 34.2 (L) 09/11/2024      10/07/2024    PLT  09/11/2024      Comment:      Unable result PLT due to clumping      BMP:   Lab Results   Component Value Date     10/07/2024     09/11/2024    POTASSIUM 4.5 10/07/2024    POTASSIUM 4.9 09/11/2024    CHLORIDE 107 10/07/2024    CHLORIDE 107 09/11/2024    CO2 21 (L) 10/07/2024    CO2 20 (L) 09/11/2024    BUN 31.1 (H) 10/07/2024    BUN 33.1 (H) 09/11/2024    CR 1.34 (H) 10/07/2024    CR 1.61 (H) 09/11/2024     (H) 11/04/2024     (H) 10/07/2024     COAGS:   Lab Results   Component Value Date    PTT 27 02/23/2018    INR 0.98 02/23/2018     POC:   Lab Results   Component Value Date     (H) 08/05/2019     HEPATIC:   Lab Results   Component Value Date    ALBUMIN 4.1 09/11/2024    PROTTOTAL 6.6 09/11/2024    ALT 16 09/11/2024    AST 24 09/11/2024    ALKPHOS 52 09/11/2024    BILITOTAL 0.3 09/11/2024     OTHER:   Lab Results   Component Value Date    A1C 7.6 (H) 10/07/2024    RINKU 8.7 (L) 10/07/2024    PHOS 3.8 03/09/2023    MAG 2.2 08/31/2023    LIPASE 15 11/20/2020    TSH 0.42 10/07/2024    T4 1.80 (H) 03/28/2024    CRP 7.2 08/01/2018    SED 17 01/20/2023       Anesthesia Plan    ASA Status:  3       Anesthesia Type: MAC.     - Reason for MAC: immobility needed, straight local not clinically adequate      Maintenance: Balanced.        Consents    Anesthesia Plan(s) and associated risks, benefits, and realistic alternatives discussed. Questions answered and patient/representative(s) expressed understanding.     - Discussed: Risks, Benefits and Alternatives for BOTH SEDATION and the PROCEDURE were discussed     - Discussed with:  Patient      - Extended Intubation/Ventilatory Support Discussed: No.      - Patient is DNR/DNI Status: No     Use of blood products discussed: No .     Postoperative Care    Pain management: Multi-modal analgesia.   PONV prophylaxis: Ondansetron (or other 5HT-3)     Comments:               Sohail Hopkins MD    I have reviewed the pertinent notes and  labs in the chart from the past 30 days and (re)examined the patient.  Any updates or changes from those notes are reflected in this note.             # Drug Induced Platelet Defect: home medication list includes an antiplatelet medication   # Hypertension: Noted on problem list        # DMII: A1C = 7.6 % (Ref range: 0.0 - 5.6 %) within past 6 months   # Obesity: Estimated body mass index is 39.65 kg/m  as calculated from the following:    Height as of this encounter: 1.524 m (5').    Weight as of this encounter: 92.1 kg (203 lb).       # Financial/Environmental Concerns:

## 2024-11-06 ENCOUNTER — PATIENT OUTREACH (OUTPATIENT)
Dept: CARE COORDINATION | Facility: CLINIC | Age: 79
End: 2024-11-06

## 2024-11-06 NOTE — PROGRESS NOTES
Clinic Care Coordination Contact  Community Health Worker Follow Up    Care Gaps:     Health Maintenance Due   Topic Date Due    URINE DRUG SCREEN  Never done    ZOSTER IMMUNIZATION (2 of 3) 10/24/2016    RSV VACCINE (1 - 1-dose 75+ series) Never done    MEDICARE ANNUAL WELLNESS VISIT  10/12/2023    INFLUENZA VACCINE (1) 09/01/2024    PHQ-9  11/24/2024       Did Not Address    Care Plan:   Care Plan: Community Resources       Problem: Insufficient In-home support       Note:     Goal Statement: Sammie will continue working with Care Coordination to ensure her needs are met for overall health and wellbeing.  Date Goal Set: 12/5/23  Barriers: Limited finances  Strengths: Strong support system  Date to Achieve By: 12/5/24  Patient expressed understanding of goal: yes  Action steps to achieve this goal:  1. I will continue to follow up with medical team as needed  2. I will work with FRW on discussing and applying for E/W program  4. I will consider options for chore services in my home from CCSW and CHW  5. I will outreach to Care Coordination  for further questions or concerns          Goal: Establish adequate home support       This Visit's Progress: 60% Recent Progress: 50%                        Discussed:  Patient stated her  past away on 7/30/24 from stage 4 cancer.  Patient stated she has not applied for an Elderly Waiver.  Patient stated she receives $2,000 per month from her 's pension.  Patient stated she has food.  Patient stated she uses [employer] for transportation.  Patient stated she is having issues with her arm, patient stated she was told it will take months to heal.    Intervention and Education during outreach:     CHW asked patient if there are any resource needs, patient declined.    CHW encouraged patient to contact CC if there are any other changes or resources needed.  Patient acknowledged understanding.      CHW Plan: will outreach in one month    Nikia Wan  CHW  Clinic Care Coordination  [facility] Clinics: [facility] [place], and [facility]  Phone: [phone]

## 2024-11-12 ENCOUNTER — HOSPITAL ENCOUNTER (OUTPATIENT)
Dept: CARDIOLOGY | Facility: CLINIC | Age: 79
Discharge: HOME | End: 2024-11-12
Attending: INTERNAL MEDICINE | Admitting: INTERNAL MEDICINE
Payer: MEDICARE

## 2024-11-12 DIAGNOSIS — I65.23 BILATERAL CAROTID ARTERY STENOSIS: ICD-10-CM

## 2024-11-12 PROCEDURE — 93880 EXTRACRANIAL BILAT STUDY: CPT

## 2024-11-13 ENCOUNTER — OFFICE VISIT (OUTPATIENT)
Dept: ENDOCRINOLOGY | Facility: CLINIC | Age: 79
End: 2024-11-13
Payer: COMMERCIAL

## 2024-11-13 ENCOUNTER — MYC MEDICAL ADVICE (OUTPATIENT)
Dept: ENDOCRINOLOGY | Facility: CLINIC | Age: 79
End: 2024-11-13

## 2024-11-13 ENCOUNTER — PATIENT OUTREACH (OUTPATIENT)
Dept: CARE COORDINATION | Facility: CLINIC | Age: 79
End: 2024-11-13

## 2024-11-13 VITALS
BODY MASS INDEX: 40.82 KG/M2 | WEIGHT: 209 LBS | OXYGEN SATURATION: 98 % | DIASTOLIC BLOOD PRESSURE: 74 MMHG | HEART RATE: 76 BPM | SYSTOLIC BLOOD PRESSURE: 188 MMHG | RESPIRATION RATE: 20 BRPM

## 2024-11-13 DIAGNOSIS — E11.8 DIABETES MELLITUS TYPE 2 WITH COMPLICATIONS (H): ICD-10-CM

## 2024-11-13 DIAGNOSIS — E11.8 DIABETES MELLITUS TYPE 2 WITH COMPLICATIONS (H): Primary | ICD-10-CM

## 2024-11-13 DIAGNOSIS — C73 PAPILLARY CARCINOMA OF THYROID (H): ICD-10-CM

## 2024-11-13 DIAGNOSIS — E78.2 MIXED HYPERLIPIDEMIA: ICD-10-CM

## 2024-11-13 RX ORDER — HYDROCHLOROTHIAZIDE 12.5 MG/1
1 CAPSULE ORAL ONCE
Qty: 6 EACH | Refills: 3 | Status: SHIPPED | OUTPATIENT
Start: 2024-11-13 | End: 2024-11-13

## 2024-11-13 RX ORDER — LEVOTHYROXINE SODIUM 112 MCG
112 TABLET ORAL DAILY
Qty: 90 TABLET | Refills: 3 | Status: SHIPPED | OUTPATIENT
Start: 2024-11-13

## 2024-11-13 RX ORDER — FENOFIBRATE 145 MG/1
145 TABLET, COATED ORAL AT BEDTIME
Qty: 90 TABLET | Refills: 3 | Status: SHIPPED | OUTPATIENT
Start: 2024-11-13

## 2024-11-13 RX ORDER — ICOSAPENT ETHYL 1 G/1
2 CAPSULE ORAL EVERY EVENING
Qty: 180 CAPSULE | Refills: 3 | Status: SHIPPED | OUTPATIENT
Start: 2024-11-13

## 2024-11-13 RX ORDER — INSULIN GLARGINE 100 [IU]/ML
10 INJECTION, SOLUTION SUBCUTANEOUS EVERY MORNING
Qty: 15 ML | Refills: 4 | Status: SHIPPED | OUTPATIENT
Start: 2024-11-13

## 2024-11-13 NOTE — PROGRESS NOTES
Chasity Hodgson is a 79 year old year old female here for evaluation of Diabetes via a billable video visit.SHe also has PMHx of Diabetes Mellitus, and PTC/post surgical hypothyrodisim. She also has past medical istory of ANNETTE, gastroparesis, obesity,  Last seen by me July 2024    INTERVAL HISTORY:  - L arm spontaneous biceps tendonopathy, hospitalized at [facility], following with orth  - Diarrhea 3-4 days last week, colostomy bag with loose stool  - AM 69 today  - basaglar- will use variable doses-- <150 in AM will dose 10, if >150 will dose 20  - noting some evening lows post dinnertime  - Hypo symptoms- fatigue, or with >250 will feel fatigue  - Once does recall symptoms in 50s, otherwise mostly minimal symptoms with hypos  - Has mostly been taking basaglar 10u as AM BG lower      1) Diabetes Mellitus    Diabetes History:  Diagnosis: 2015, picked up on routine labs  Hospitalizations: None  Previous Regimens: Farxia (difficulty with frequent urination), Toujeo (was working well but insurance formulary changed 1/1/2021) At highest was at 48u daily, and then had profound low. At time of discontinuing, was down to 5u of toujeo. Previously on metformin. Glipizide, Ozepmic (worsening gastroparesis), Sitagliptin (swelling in leg)  Current Regimen: Jardiance 10mg daily, basaglar 20u daily, if under 150 when goes to dose, will take only 10, Novolog 10u with breakfast and 12u with dinner (will if eat lunch as well, but doesn't always eat lunch)     BG check- Freestyle Libre3  Trends-  see below    Complications: Gastroparesis- previously on reglan, overall not impactful    Last eye exam: July 2024- no retinopathy, has cataracts,   Foot Exam: [facility], checks every 60 days   Blood pressure- Lisinopril 40mg daily, Atenolol 50mg daily  Lipids- ezetimibe 10 mg daily, fenofibrate 145mg daily  SIOBHAN last 3/2024- undetectable    2) Hypothyroidism s/p thyroidectomy for PTC  Diagnosis: known 3 nodules that were being  monitored, and in 2002 s/p thyroidectomy (nodule in isthmus was cancer), s/p BUTELR (per patient, unclear if clean margins so received BUTLER, unknown dose)  Treatment: Levothyroxine 112mcg 6 days a week, 1/2 tablet friday  Residual tissue on R that has been monitored, not growing.   Last ultrasound 10/2021- no tissue seen  last biomarkers 10/2023- TG <0.1, TGAb <0.4   Monitoring every other year with bloodwork, every 5 with imaging     3) Vitamin D Deficiency  - Taking 2 tab daily  - PTH 9 (9/16/2021)--> recheck 21 10/21/2022  - Last Vit D 62    4) Hypertriglyceridemia  - On ezetimibe and fenofibrate, tried statin before with myalgias  - Eats minimal processed foods, minimal fried foods/baked treats  - does eat toast regularly      BP Readings from Last 3 Encounters:   11/13/24 (!) 188/74   11/04/24 109/50   10/24/24 (!) 161/63       Lab Results   Component Value Date    A1C 9.6 01/11/2022    A1C 9.5 10/07/2021    A1C 9.0 08/02/2021    A1C 8.7 07/07/2021    A1C 6.2 11/27/2020    A1C 6.8 09/10/2019       Recent Labs   Lab Test 01/11/22  1359 03/29/19  1355 08/01/17  0000 02/15/17  0000 02/11/16  0000 03/31/15  1327   CHOL 177 196   < > 142   < > 158   HDL 34* 27*   < > 31   < > 34*   LDL 81 99   < > 49   < > 51   TRIG 308* 349*   < > 309   < > 365*   CHOLHDLRATIO  --   --   --  4.6  --  4.6    < > = values in this interval not displayed.       Lab Results   Component Value Date    MICROL 36 01/11/2022    MICROL 23 07/07/2021     No results found for: MICROALBUMIN        Wt Readings from Last 3 Encounters:   11/13/24 94.8 kg (209 lb)   11/04/24 92.1 kg (203 lb)   10/24/24 93.8 kg (206 lb 11.2 oz)       Current Outpatient Medications   Medication Sig Dispense Refill    acetaminophen (TYLENOL) 500 MG tablet Take 1,000 mg by mouth every 8 hours as needed      amLODIPine (NORVASC) 2.5 MG tablet Take 1 tablet (2.5 mg) by mouth daily as needed For blood pressure > 140 90 tablet 3    amoxicillin (AMOXIL) 500 MG capsule Takes 4  caps before every dental appointments.      aspirin (ASA) 81 MG EC tablet Take 81 mg by mouth daily (with lunch)      BIOTIN PO Take 1 capsule by mouth at bedtime Unknown dose      blood glucose (NO BRAND SPECIFIED) test strip Use to test blood sugar 3 times daily or as directed. 200 strip 3    calcium citrate 250 MG TABS Take 1 tablet by mouth at bedtime      carvedilol (COREG) 12.5 MG tablet Take 1 tablet (12.5 mg) by mouth 2 times daily (with meals) 180 tablet 2    clotrimazole (LOTRIMIN) 1 % cream Apply topically as needed (rash/yeast infection)      Continuous Glucose Sensor (FREESTYLE BRANDY 3 PLUS SENSOR) MISC 1 each once for 1 dose. Every 15 days 6 each 1    cyanocobalamin (VITAMIN B-12) 1000 MCG tablet Take 1,000 mcg by mouth every evening      empagliflozin (JARDIANCE) 10 MG TABS tablet Take 1 tablet (10 mg) by mouth every evening. 90 tablet 2    ezetimibe (ZETIA) 10 MG tablet Take 1 tablet (10 mg) by mouth every evening 90 tablet 3    famotidine (PEPCID) 20 MG tablet Take 1 tablet (20 mg) by mouth every evening.      fenofibrate (TRICOR) 145 MG tablet Take 145 mg by mouth at bedtime      fexofenadine (ALLEGRA) 180 MG tablet Take 180 mg by mouth daily (with lunch) Takes in the afternoon 120 0    furosemide (LASIX) 20 MG tablet Take 1 tablet (20 mg) by mouth daily as needed (lower extremity edema).      icosapent ethyl (VASCEPA) 1 g CAPS capsule Take 2 g by mouth every evening. 180 capsule 3    insulin aspart (NOVOLOG PEN) 100 UNIT/ML pen Inject 10 Units subcutaneously daily (with breakfast) AND 12 Units daily (before supper). 15 mL 4    insulin glargine 100 UNIT/ML pen Inject 20 Units subcutaneously at bedtime. (Patient taking differently: Inject 20 Units subcutaneously every morning.) 15 mL 4    ipratropium (ATROVENT) 0.03 % nasal spray Spray 1 spray in nostril every 8 hours as needed      ketoconazole (NIZORAL) 2 % external cream Apply topically 2 times daily as needed      levothyroxine  (SYNTHROID/LEVOTHROID) 112 MCG tablet Take 1 tablet (112 mcg) by mouth daily (Patient taking differently: Take 112 mcg by mouth every morning.) 90 tablet 1    Lidocaine (LIDOCARE) 4 % Patch Place 1 patch onto the skin as needed To prevent lidocaine toxicity, patient should be patch free for 12 hrs daily.      methocarbamol (ROBAXIN) 500 MG tablet Take 1-2 tablets (500-1,000 mg) by mouth 4 times daily as needed for muscle spasms. 30 tablet 0    minoxidil (LONITEN) 2.5 MG tablet Take 0.25 tablets by mouth twice a week On Monday and Thursday      Multiple vitamin TABS Take 1 tablet by mouth daily      nitroGLYcerin (NITROSTAT) 0.4 MG sublingual tablet Place 1 tablet (0.4 mg) under the tongue every 5 minutes as needed for chest pain (no more than 3 in one hour; after 3rd, call 911.) 25 tablet 3    olmesartan (BENICAR) 40 MG tablet Take 1 tablet (40 mg) by mouth daily. (Patient taking differently: Take 40 mg by mouth every morning.) 90 tablet 3    ondansetron (ZOFRAN) 4 MG tablet Take 1 tablet (4 mg) by mouth every 6 hours as needed Reported on 3/20/2017 20 tablet 0    oxyCODONE (ROXICODONE) 5 MG tablet Take 1 tablet (5 mg) by mouth every 4 hours as needed for severe pain (IF pain not managed with non-pharmacological and non-opioid interventions). 10 tablet 0    pantoprazole (PROTONIX) 40 MG EC tablet Take 40 mg by mouth 2 times daily      polyethylene glycol (MIRALAX) 17 g packet Take 17 g by mouth 2 times daily as needed for constipation.      Polyethylene Glycol 400 (BLINK TEARS) 0.25 % GEL Place 1 drop into both eyes at bedtime      senna-docusate (SENOKOT-S/PERICOLACE) 8.6-50 MG tablet Take 1 tablet by mouth daily. (Patient taking differently: Take 1 tablet by mouth daily as needed.)      sucralfate (CARAFATE) 1 GM/10ML suspension Take 1 g by mouth 4 times daily as needed (GERD)      tretinoin (RETIN-A) 0.025 % external cream Apply topically nightly as needed (facial lesions) Use every night as tolerated - spot  treat lesion      triamcinolone (KENALOG) 0.025 % external ointment Apply topically 2 times daily as needed for irritation      Vitamin D3 50 mcg (2000 units) tablet Take 1 tablet by mouth every evening Takes in the afternoon         Histories reviewed and updated in Epic.  Past Medical History:   Diagnosis Date    Abdominal adhesions 1984, 96,99    s/p lysis    Acne rosacea     Allergic rhinitis     Alopecia     Anemia     CAD (coronary artery disease)     Carotid stenosis     CKD (chronic kidney disease) stage 2, GFR 60-89 ml/min     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)     CPAP (continuous positive airway pressure) dependence     Depression     Diabetic gastroparesis (H)     DM2 (diabetes mellitus, type 2) (H)     DVT of axillary vein, acute right (H)     Fibromyalgia     GERD (gastroesophageal reflux disease)     Hernia of unspecified site of abdominal cavity without mention of obstruction or gangrene     bilateral    History of blood transfusion     10/ 1980    History of thrombophlebitis     HTN (hypertension)     Hypertriglyceridemia     Hypokalemia     Hypothyroidism     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    Mumps     ANNETTE (obstructive sleep apnea)     Osteoarthritis of glenohumeral joint     Papillary carcinoma of thyroid (H)     s/p thyroidectomy - [facility]uegemer    Poliomyelitis     poor circulation right leg    Postsurgical hypothyroidism     s/p papillary thryoid cancer - Ruegemer    Pulmonary embolism (H)     Pulmonary nodule     S/P carpal tunnel release     bilateral    S/P hardware removal 01/2014    still with lingering foot pain    S/P shoulder surgery     bilateral    Septic joint (H)     right knee    Venous insufficiency        Past Surgical History:   Procedure Laterality Date    AMPUTATE TOE(S)  03/15/2012    Procedure:AMPUTATE TOE(S); Surgeon:RUBEN MOSES; Location:[facility] OR    AMPUTATE TOE(S)      APPENDECTOMY  1972    APPENDECTOMY      ARTHRODESIS FOOT  03/15/2012    Procedure:ARTHRODESIS FOOT; RIGHT  TRIPLE ARTHRODESIS, FIFTH TOE AMPUTATION, LATERAL LIGAMENT RECONSTRUCTION, TENDON TRANSFER AND RELEASE [MINI C-ARM, ARTHREX 4.5 AND 6.7 STAPLES, BIOCOMPOSITE TENODESIS SCREWS]; Surgeon:RUBEN MOSES; Location: OR    ARTHRODESIS FOOT Right     Right foot triple arthrodesis and removal of hardware    ARTHROSCOPY SHOULDER  06/25/2015    REVISION SUBACROMIAL DECOMPRESSION, EXCISION OF GANGLION CYST, DEBRIDEMENT AND EXCISION OF THE GLENOHUMERAL JOINT GANGLION CYST, CORACOID DECOMPRESSION, POSSIBLE SUBSCAPULARIS REPAIR AND OPEN SUBSCAPULARIS BICEP    ARTHROSCOPY SHOULDER ROTATOR CUFF REPAIR      BIOPSY  Thyroid 2002    BLADDER SURGERY      BOTOX INJECTION MEDICAL      BREAST SURGERY  Biopsy    CHOLECYSTECTOMY      CHOLECYSTECTOMY      COLONOSCOPY  2018    COLOSTOMY  02/07/2012    Procedure:COLOSTOMY; CREATION OF SIGMOID COLOSTOMY AND EXTENSIVE  LYSIS OF ADHESIONS; Surgeon:MONTSERRAT BENDER; Location: OR    COLOSTOMY      CV ANGIOGRAM RENAL BILATERAL Bilateral 11/17/2023    Procedure: Angiogram Renal Bilateral;  Surgeon: Ankit Bhatia MD;  Location:  [facility]    CV CORONARY ANGIOGRAM N/A 11/17/2023    Procedure: Coronary Angiogram;  Surgeon: Ankit Bhatia MD;  Location:  [facility]    CYSTOSCOPY      CYSTOSCOPY, INJECT BOTOX N/A 09/18/2023    Procedure: CYSTOSCOPY, WITH BOTULINUM TOXIN INJECTION;  Surgeon: Mishel Zendeajs MD;  Location: [facility] OR    CYSTOSCOPY, INJECT BOTOX N/A 2/20/2024    Procedure: CYSTOSCOPY, WITH BOTULINUM TOXIN INJECTION;  Surgeon: Mishel Zendejas MD;  Location: [facility] OR    CYSTOSCOPY, INJECT BOTOX N/A 11/4/2024    Procedure: INTRAVESICAL BOTULINUM TOXIN INJECTION;  Surgeon: Mishel Zendejas MD;  Location: [facility] OR    CYSTOSCOPY, TRANSURETHRAL RESECTION (TUR) TUMOR BLADDER, COMBINED N/A 11/4/2024    Procedure: EXAM UNDER ANESTHESIA, PELVIS, WITH CYSTOSCOPY;  Surgeon: Mishel Zendejas MD;  Location: [facility] OR    EYE  SURGERY      GENITOURINARY SURGERY  1999    GI SURGERY      weakened rectal sphincter with artificial stimulator    HERNIA REPAIR  1976    HYSTERECTOMY TOTAL ABDOMINAL      LAPAROTOMY, LYSIS ADHESIONS, COMBINED  02/07/2012    Procedure:COMBINED LAPAROTOMY, LYSIS ADHESIONS; Surgeon:MONTSERRAT BENDER; Location:[facility] OR    RELEASE CARPAL TUNNEL      RELEASE TENDON FOOT  03/15/2012    Procedure:RELEASE TENDON FOOT; Surgeon:SUKHDEEP METZ; Location: OR    REMOVE HARDWARE FOOT  12/13/2012    Procedure: REMOVE HARDWARE FOOT;  RIGHT FOOT REMOVAL OF HARDWARE;  Surgeon: Sukhdeep Metz MD;  Location: [facility] OR    SHOULDER SURGERY  11/12/2020    LEFT SHOULDER HEMIARHTROPLASTY, BICEP TENODESIS    SOFT TISSUE SURGERY  2018, 2020    TENDON RELEASE      foot    THYROIDECTOMY      ZZC FREEING BOWEL ADHESION,ENTEROLYSIS      1986, 1996, 1999    ZZC NONSPECIFIC PROCEDURE      throidectomy    ZZC TOTAL ABDOM HYSTERECTOMY  1980    + BSO       Allergies:  Ketorolac tromethamine, Nsaids, Celecoxib, Morphine and codeine, Codeine, Conjugated estrogens, Crestor [rosuvastatin], No clinical screening - see comments, Vioxx, and Sulfa antibiotics    Social History     Tobacco Use    Smoking status: Never     Passive exposure: Never    Smokeless tobacco: Never   Substance Use Topics    Alcohol use: Never       Family History   Problem Relation Age of Onset    Arthritis Mother     Hypertension Mother     Cerebrovascular Disease Mother     Obesity Mother     Heart Disease Mother         MI's    Hypertension Father     Respiratory Father         Adult RDS    Diabetes Father     Skin Cancer Sister     Arthritis Sister     Cancer Sister     Diabetes Sister     Hypertension Sister     Breast Cancer Sister     Depression Sister     Thyroid Disease Sister     Obesity Sister     Arthritis Sister     Hypertension Sister     Thyroid Disease Sister     Osteoporosis Sister     Obesity Sister     Cancer Sister         colon polup     Hypertension Sister     Osteoporosis Sister     Obesity Sister     Colon Cancer Sister     Lipids Sister     Obesity Sister     Heart Disease Brother         MI at 54    Other Cancer Brother         Lung & bone    Lipids Brother     Hypertension Brother     Diabetes Brother     Hyperlipidemia Brother     Obesity Maternal Grandmother         Dad s mother    Skin Cancer Maternal Grandmother         skin cancer unknown    Cancer Maternal Grandmother         unknown skin cancer on face    Obesity Paternal Grandmother         Mothers mother    Ovarian Cancer No family hx of     Anesthesia Reaction No family hx of     Deep Vein Thrombosis (DVT) No family hx of           REVIEW OF SYSTEMS:   ROS: 10 point ROS neg other than the symptoms noted above in the HPI.      EXAM:  Vitals: BP (!) 188/74 (BP Location: Right arm, Patient Position: Sitting, Cuff Size: Adult Regular)   Pulse 76   Resp 20   Wt 94.8 kg (209 lb)   LMP  (LMP Unknown)   SpO2 98%   BMI 40.82 kg/m      BMIE= Body mass index is 40.82 kg/m .  Exam:  Constitutional: healthy, alert, no acute distress  Head: Normocephalic. No masses, lesions, no exophthalmos/proptosis  ENT: normal thyroid, no palpable nodules, no cervical lymph nodes  Respiratory: nonlabored  Gastrointestinal: Abdomen soft, non-tender.  Musculoskeletal: extremities w/ sig swelling b/l  Skin: no suspicious lesions or rashes  Neurologic: Gait normal. sensation grossly intact  Psychiatric: mentation appears normal, calm      Neck Ultrasound 8/2022:  FINDINGS: Postoperative changes of thyroidectomy are noted. No  evidence for a recurrent soft tissue lesion in the thyroidectomy bed.  No worrisome lesions are identified in the neck. Ultrasound scanning  through the region of the left neck palpable abnormality demonstrates  a normal-appearing left submandibular gland. Incidentally noted  atherosclerotic plaque is noted in the left internal carotid artery.                                                                     IMPRESSION:    1. No convincing evidence for recurrent malignancy in the neck.   2. The left neck palpable abnormality appears to correspond to a  normal left submandibular gland. If there is continued clinical  concern for a suspicious neck mass, consider contrast-enhanced CT or  MRI of the neck for further evaluation.        ASSESSMENT/PLAN:  No diagnosis found.  No orders of the defined types were placed in this encounter.      1) Diabetes Mellitus   - Glucose Control- NOT at goal, but improved/minimized hypoglycmiea   - Basaglar DECREASE to 10u daily, If fasting BG >150, increase to 12.  - CONTINUE 10u with breakfast and 12u with dinner   - Continue  Jardiance 10mg daily   - Cardiovascular Risk- continue ezetimibe, fenofibrate,   - Ophthalmology- uptodate, instructed to return Aug 2024, no NPDR.  - Podiatry- patient instructed on routine foot care, follows with podiatry  - Renal- Uptodate,  continue lisinopril, recheck SIOBHAN 3/2025    2) Cardiovascular Risk-   - Continue ezetimibe, fenofibrate.   - Continue icosapent ethyl 1g BID  - Continue aspirin 81mg daily     3) Hypothyroidism, postsurgical  - TSH/FT4 at goal  - Continue LT4 112mcg     4) History of Papillary Thyroid Cancer  - U/S and TG/TGab show biochemically and structurally complete response  - Now 20+ years out (2002)  - enlarged LN, - no recurrence on ultrasound 2022  - Recheck TG level 2025 (last 2023, plan for every other year), last ultrasound 2022, next 2027    RTC- 3 months as scheduled    A total of 32 minutes were spent today 11/13/24 on this visit including chart review, history and counseling, documentation and other activities as detailed above.

## 2024-11-13 NOTE — LETTER
11/13/2024      Chasity Hodgson  [address] [address] [facility] [address]      Dear Colleague,    Thank you for referring your patient, Chasity Hodgson, to the [facility] [place]. Please see a copy of my visit note below.    Chasity Hodgson is a 79 year old year old female here for evaluation of Diabetes via a billable video visit.SHe also has PMHx of Diabetes Mellitus, and PTC/post surgical hypothyrodisim. She also has past medical istory of ANNETTE, gastroparesis, obesity,  Last seen by me July 2024    INTERVAL HISTORY:  - L arm spontaneous biceps tendonopathy, hospitalized at [facility], following with orth  - Diarrhea 3-4 days last week, colostomy bag with loose stool  - AM 69 today  - basaglar- will use variable doses-- <150 in AM will dose 10, if >150 will dose 20  - noting some evening lows post dinnertime  - Hypo symptoms- fatigue, or with >250 will feel fatigue  - Once does recall symptoms in 50s, otherwise mostly minimal symptoms with hypos  - Has mostly been taking basaglar 10u as AM BG lower      1) Diabetes Mellitus    Diabetes History:  Diagnosis: 2015, picked up on routine labs  Hospitalizations: None  Previous Regimens: Farxia (difficulty with frequent urination), Toujeo (was working well but insurance formulary changed 1/1/2021) At highest was at 48u daily, and then had profound low. At time of discontinuing, was down to 5u of toujeo. Previously on metformin. Glipizide, Ozepmic (worsening gastroparesis), Sitagliptin (swelling in leg)  Current Regimen: Jardiance 10mg daily, basaglar 20u daily, if under 150 when goes to dose, will take only 10, Novolog 10u with breakfast and 12u with dinner (will if eat lunch as well, but doesn't always eat lunch)     BG check- Freestyle Libre3  Trends-  see below    Complications: Gastroparesis- previously on reglan, overall not impactful    Last eye exam: July 2024- no retinopathy, has cataracts,   Foot Exam: [facility], checks every 60 days   Blood  pressure- Lisinopril 40mg daily, Atenolol 50mg daily  Lipids- ezetimibe 10 mg daily, fenofibrate 145mg daily  SIOBHAN last 3/2024- undetectable    2) Hypothyroidism s/p thyroidectomy for PTC  Diagnosis: known 3 nodules that were being monitored, and in 2002 s/p thyroidectomy (nodule in isthmus was cancer), s/p BUTLER (per patient, unclear if clean margins so received BUTLER, unknown dose)  Treatment: Levothyroxine 112mcg 6 days a week, 1/2 tablet friday  Residual tissue on R that has been monitored, not growing.   Last ultrasound 10/2021- no tissue seen  last biomarkers 10/2023- TG <0.1, TGAb <0.4   Monitoring every other year with bloodwork, every 5 with imaging     3) Vitamin D Deficiency  - Taking 2 tab daily  - PTH 9 (9/16/2021)--> recheck 21 10/21/2022  - Last Vit D 62    4) Hypertriglyceridemia  - On ezetimibe and fenofibrate, tried statin before with myalgias  - Eats minimal processed foods, minimal fried foods/baked treats  - does eat toast regularly      BP Readings from Last 3 Encounters:   11/13/24 (!) 188/74   11/04/24 109/50   10/24/24 (!) 161/63       Lab Results   Component Value Date    A1C 9.6 01/11/2022    A1C 9.5 10/07/2021    A1C 9.0 08/02/2021    A1C 8.7 07/07/2021    A1C 6.2 11/27/2020    A1C 6.8 09/10/2019       Recent Labs   Lab Test 01/11/22  1359 03/29/19  1355 08/01/17  0000 02/15/17  0000 02/11/16  0000 03/31/15  1327   CHOL 177 196   < > 142   < > 158   HDL 34* 27*   < > 31   < > 34*   LDL 81 99   < > 49   < > 51   TRIG 308* 349*   < > 309   < > 365*   CHOLHDLRATIO  --   --   --  4.6  --  4.6    < > = values in this interval not displayed.       Lab Results   Component Value Date    MICROL 36 01/11/2022    MICROL 23 07/07/2021     No results found for: MICROALBUMIN        Wt Readings from Last 3 Encounters:   11/13/24 94.8 kg (209 lb)   11/04/24 92.1 kg (203 lb)   10/24/24 93.8 kg (206 lb 11.2 oz)       Current Outpatient Medications   Medication Sig Dispense Refill     acetaminophen (TYLENOL) 500  MG tablet Take 1,000 mg by mouth every 8 hours as needed       amLODIPine (NORVASC) 2.5 MG tablet Take 1 tablet (2.5 mg) by mouth daily as needed For blood pressure > 140 90 tablet 3     amoxicillin (AMOXIL) 500 MG capsule Takes 4 caps before every dental appointments.       aspirin (ASA) 81 MG EC tablet Take 81 mg by mouth daily (with lunch)       BIOTIN PO Take 1 capsule by mouth at bedtime Unknown dose       blood glucose (NO BRAND SPECIFIED) test strip Use to test blood sugar 3 times daily or as directed. 200 strip 3     calcium citrate 250 MG TABS Take 1 tablet by mouth at bedtime       carvedilol (COREG) 12.5 MG tablet Take 1 tablet (12.5 mg) by mouth 2 times daily (with meals) 180 tablet 2     clotrimazole (LOTRIMIN) 1 % cream Apply topically as needed (rash/yeast infection)       Continuous Glucose Sensor (FREESTYLE BRANDY 3 PLUS SENSOR) MISC 1 each once for 1 dose. Every 15 days 6 each 1     cyanocobalamin (VITAMIN B-12) 1000 MCG tablet Take 1,000 mcg by mouth every evening       empagliflozin (JARDIANCE) 10 MG TABS tablet Take 1 tablet (10 mg) by mouth every evening. 90 tablet 2     ezetimibe (ZETIA) 10 MG tablet Take 1 tablet (10 mg) by mouth every evening 90 tablet 3     famotidine (PEPCID) 20 MG tablet Take 1 tablet (20 mg) by mouth every evening.       fenofibrate (TRICOR) 145 MG tablet Take 145 mg by mouth at bedtime       fexofenadine (ALLEGRA) 180 MG tablet Take 180 mg by mouth daily (with lunch) Takes in the afternoon 120 0     furosemide (LASIX) 20 MG tablet Take 1 tablet (20 mg) by mouth daily as needed (lower extremity edema).       icosapent ethyl (VASCEPA) 1 g CAPS capsule Take 2 g by mouth every evening. 180 capsule 3     insulin aspart (NOVOLOG PEN) 100 UNIT/ML pen Inject 10 Units subcutaneously daily (with breakfast) AND 12 Units daily (before supper). 15 mL 4     insulin glargine 100 UNIT/ML pen Inject 20 Units subcutaneously at bedtime. (Patient taking differently: Inject 20 Units  subcutaneously every morning.) 15 mL 4     ipratropium (ATROVENT) 0.03 % nasal spray Spray 1 spray in nostril every 8 hours as needed       ketoconazole (NIZORAL) 2 % external cream Apply topically 2 times daily as needed       levothyroxine (SYNTHROID/LEVOTHROID) 112 MCG tablet Take 1 tablet (112 mcg) by mouth daily (Patient taking differently: Take 112 mcg by mouth every morning.) 90 tablet 1     Lidocaine (LIDOCARE) 4 % Patch Place 1 patch onto the skin as needed To prevent lidocaine toxicity, patient should be patch free for 12 hrs daily.       methocarbamol (ROBAXIN) 500 MG tablet Take 1-2 tablets (500-1,000 mg) by mouth 4 times daily as needed for muscle spasms. 30 tablet 0     minoxidil (LONITEN) 2.5 MG tablet Take 0.25 tablets by mouth twice a week On Monday and Thursday       Multiple vitamin TABS Take 1 tablet by mouth daily       nitroGLYcerin (NITROSTAT) 0.4 MG sublingual tablet Place 1 tablet (0.4 mg) under the tongue every 5 minutes as needed for chest pain (no more than 3 in one hour; after 3rd, call 911.) 25 tablet 3     olmesartan (BENICAR) 40 MG tablet Take 1 tablet (40 mg) by mouth daily. (Patient taking differently: Take 40 mg by mouth every morning.) 90 tablet 3     ondansetron (ZOFRAN) 4 MG tablet Take 1 tablet (4 mg) by mouth every 6 hours as needed Reported on 3/20/2017 20 tablet 0     oxyCODONE (ROXICODONE) 5 MG tablet Take 1 tablet (5 mg) by mouth every 4 hours as needed for severe pain (IF pain not managed with non-pharmacological and non-opioid interventions). 10 tablet 0     pantoprazole (PROTONIX) 40 MG EC tablet Take 40 mg by mouth 2 times daily       polyethylene glycol (MIRALAX) 17 g packet Take 17 g by mouth 2 times daily as needed for constipation.       Polyethylene Glycol 400 (BLINK TEARS) 0.25 % GEL Place 1 drop into both eyes at bedtime       senna-docusate (SENOKOT-S/PERICOLACE) 8.6-50 MG tablet Take 1 tablet by mouth daily. (Patient taking differently: Take 1 tablet by mouth  daily as needed.)       sucralfate (CARAFATE) 1 GM/10ML suspension Take 1 g by mouth 4 times daily as needed (GERD)       tretinoin (RETIN-A) 0.025 % external cream Apply topically nightly as needed (facial lesions) Use every night as tolerated - spot treat lesion       triamcinolone (KENALOG) 0.025 % external ointment Apply topically 2 times daily as needed for irritation       Vitamin D3 50 mcg (2000 units) tablet Take 1 tablet by mouth every evening Takes in the afternoon         Histories reviewed and updated in Epic.  Past Medical History:   Diagnosis Date     Abdominal adhesions 1984, 96,99    s/p lysis     Acne rosacea      Allergic rhinitis      Alopecia      Anemia      CAD (coronary artery disease)      Carotid stenosis      CKD (chronic kidney disease) stage 2, GFR 60-89 ml/min      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)      CPAP (continuous positive airway pressure) dependence      Depression      Diabetic gastroparesis (H)      DM2 (diabetes mellitus, type 2) (H)      DVT of axillary vein, acute right (H)      Fibromyalgia      GERD (gastroesophageal reflux disease)      Hernia of unspecified site of abdominal cavity without mention of obstruction or gangrene     bilateral     History of blood transfusion     10/ 1980     History of thrombophlebitis      HTN (hypertension)      Hypertriglyceridemia      Hypokalemia      Hypothyroidism      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     Mumps      ANNETTE (obstructive sleep apnea)      Osteoarthritis of glenohumeral joint      Papillary carcinoma of thyroid (H)     s/p thyroidectomy - Ruegemer     Poliomyelitis     poor circulation right leg     Postsurgical hypothyroidism     s/p papillary thryoid cancer - Ruegemer     Pulmonary embolism (H)      Pulmonary nodule      S/P carpal tunnel release     bilateral     S/P hardware removal 01/2014    still with lingering foot pain     S/P shoulder surgery     bilateral     Septic joint (H)     right knee     Venous insufficiency         Past Surgical History:   Procedure Laterality Date     AMPUTATE TOE(S)  03/15/2012    Procedure:AMPUTATE TOE(S); Surgeon:RUBEN MOSES; Location: OR     AMPUTATE TOE(S)       APPENDECTOMY  1972     APPENDECTOMY       ARTHRODESIS FOOT  03/15/2012    Procedure:ARTHRODESIS FOOT; RIGHT TRIPLE ARTHRODESIS, FIFTH TOE AMPUTATION, LATERAL LIGAMENT RECONSTRUCTION, TENDON TRANSFER AND RELEASE [MINI C-ARM, ARTHREX 4.5 AND 6.7 STAPLES, BIOCOMPOSITE TENODESIS SCREWS]; Surgeon:RUBEN MOSES; Location: OR     ARTHRODESIS FOOT Right     Right foot triple arthrodesis and removal of hardware     ARTHROSCOPY SHOULDER  06/25/2015    REVISION SUBACROMIAL DECOMPRESSION, EXCISION OF GANGLION CYST, DEBRIDEMENT AND EXCISION OF THE GLENOHUMERAL JOINT GANGLION CYST, CORACOID DECOMPRESSION, POSSIBLE SUBSCAPULARIS REPAIR AND OPEN SUBSCAPULARIS BICEP     ARTHROSCOPY SHOULDER ROTATOR CUFF REPAIR       BIOPSY  Thyroid 2002     BLADDER SURGERY       BOTOX INJECTION MEDICAL       BREAST SURGERY  Biopsy     CHOLECYSTECTOMY       CHOLECYSTECTOMY       COLONOSCOPY  2018     COLOSTOMY  02/07/2012    Procedure:COLOSTOMY; CREATION OF SIGMOID COLOSTOMY AND EXTENSIVE  LYSIS OF ADHESIONS; Surgeon:MONTSERRAT BENDER; Location:[facility]     COLOSTOMY       CV ANGIOGRAM RENAL BILATERAL Bilateral 11/17/2023    Procedure: Angiogram Renal Bilateral;  Surgeon: Ankit Bhatia MD;  Location:  HEART CARDIAC CATH LAB     CV CORONARY ANGIOGRAM N/A 11/17/2023    Procedure: Coronary Angiogram;  Surgeon: Ankit Bhatia MD;  Location: [facility] CARDIAC CATH LAB     CYSTOSCOPY       CYSTOSCOPY, INJECT BOTOX N/A 09/18/2023    Procedure: CYSTOSCOPY, WITH BOTULINUM TOXIN INJECTION;  Surgeon: Mishel Zendejas MD;  [place] [facility] [place]     CYSTOSCOPY, INJECT BOTOX N/A 2/20/2024    Procedure: CYSTOSCOPY, WITH BOTULINUM TOXIN INJECTION;  Surgeon: Mishel Zendejas MD;  [place] [facility] [place]     CYSTOSCOPY, INJECT BOTOX N/A  11/4/2024    Procedure: INTRAVESICAL BOTULINUM TOXIN INJECTION;  Surgeon: Mishel Zendejas MD;  [place] [facility] [place]     CYSTOSCOPY, TRANSURETHRAL RESECTION (TUR) TUMOR BLADDER, COMBINED N/A 11/4/2024    Procedure: EXAM UNDER ANESTHESIA, PELVIS, WITH CYSTOSCOPY;  Surgeon: Mishel Zendejas MD;  [place] [facility] [place]     EYE SURGERY       GENITOURINARY SURGERY  1999     GI SURGERY      weakened rectal sphincter with artificial stimulator     HERNIA REPAIR  1976     HYSTERECTOMY TOTAL ABDOMINAL       LAPAROTOMY, LYSIS ADHESIONS, COMBINED  02/07/2012    Procedure:COMBINED LAPAROTOMY, LYSIS ADHESIONS; Surgeon:MONTSERRAT BENDER; Location: OR     RELEASE CARPAL TUNNEL       RELEASE TENDON FOOT  03/15/2012    Procedure:RELEASE TENDON FOOT; Surgeon:SUKHDEEP METZ; Location: OR     REMOVE HARDWARE FOOT  12/13/2012    Procedure: REMOVE HARDWARE FOOT;  RIGHT FOOT REMOVAL OF HARDWARE;  Surgeon: Sukhdeep Metz MD;  Location:  OR     SHOULDER SURGERY  11/12/2020    LEFT SHOULDER HEMIARHTROPLASTY, BICEP TENODESIS     SOFT TISSUE SURGERY  2018, 2020     TENDON RELEASE      foot     THYROIDECTOMY       ZZC FREEING BOWEL ADHESION,ENTEROLYSIS      1986, 1996, 1999     ZZC NONSPECIFIC PROCEDURE      throidectomy     ZZC TOTAL ABDOM HYSTERECTOMY  1980    + BSO       Allergies:  Ketorolac tromethamine, Nsaids, Celecoxib, Morphine and codeine, Codeine, Conjugated estrogens, Crestor [rosuvastatin], No clinical screening - see comments, Vioxx, and Sulfa antibiotics    Social History     Tobacco Use     Smoking status: Never     Passive exposure: Never     Smokeless tobacco: Never   Substance Use Topics     Alcohol use: Never       Family History   Problem Relation Age of Onset     Arthritis Mother      Hypertension Mother      Cerebrovascular Disease Mother      Obesity Mother      Heart Disease Mother         MI's     Hypertension Father      Respiratory Father         Adult RDS     Diabetes Father      Skin  Cancer Sister      Arthritis Sister      Cancer Sister      Diabetes Sister      Hypertension Sister      Breast Cancer Sister      Depression Sister      Thyroid Disease Sister      Obesity Sister      Arthritis Sister      Hypertension Sister      Thyroid Disease Sister      Osteoporosis Sister      Obesity Sister      Cancer Sister         colon polup     Hypertension Sister      Osteoporosis Sister      Obesity Sister      Colon Cancer Sister      Lipids Sister      Obesity Sister      Heart Disease Brother         MI at 54     Other Cancer Brother         Lung & bone     Lipids Brother      Hypertension Brother      Diabetes Brother      Hyperlipidemia Brother      Obesity Maternal Grandmother         Dad s mother     Skin Cancer Maternal Grandmother         skin cancer unknown     Cancer Maternal Grandmother         unknown skin cancer on face     Obesity Paternal Grandmother         Mothers mother     Ovarian Cancer No family hx of      Anesthesia Reaction No family hx of      Deep Vein Thrombosis (DVT) No family hx of           REVIEW OF SYSTEMS:   ROS: 10 point ROS neg other than the symptoms noted above in the HPI.      EXAM:  Vitals: BP (!) 188/74 (BP Location: Right arm, Patient Position: Sitting, Cuff Size: Adult Regular)   Pulse 76   Resp 20   Wt 94.8 kg (209 lb)   LMP  (LMP Unknown)   SpO2 98%   BMI 40.82 kg/m      BMIE= Body mass index is 40.82 kg/m .  Exam:  Constitutional: healthy, alert, no acute distress  Head: Normocephalic. No masses, lesions, no exophthalmos/proptosis  ENT: normal thyroid, no palpable nodules, no cervical lymph nodes  Respiratory: nonlabored  Gastrointestinal: Abdomen soft, non-tender.  Musculoskeletal: extremities w/ sig swelling b/l  Skin: no suspicious lesions or rashes  Neurologic: Gait normal. sensation grossly intact  Psychiatric: mentation appears normal, calm      Neck Ultrasound 8/2022:  FINDINGS: Postoperative changes of thyroidectomy are noted. No  evidence  for a recurrent soft tissue lesion in the thyroidectomy bed.  No worrisome lesions are identified in the neck. Ultrasound scanning  through the region of the left neck palpable abnormality demonstrates  a normal-appearing left submandibular gland. Incidentally noted  atherosclerotic plaque is noted in the left internal carotid artery.                                                                    IMPRESSION:    1. No convincing evidence for recurrent malignancy in the neck.   2. The left neck palpable abnormality appears to correspond to a  normal left submandibular gland. If there is continued clinical  concern for a suspicious neck mass, consider contrast-enhanced CT or  MRI of the neck for further evaluation.        ASSESSMENT/PLAN:  No diagnosis found.  No orders of the defined types were placed in this encounter.      1) Diabetes Mellitus   - Glucose Control- NOT at goal, but improved/minimized hypoglycmiea   - Basaglar DECREASE to 10u daily, If fasting BG >150, increase to 12.  - CONTINUE 10u with breakfast and 12u with dinner   - Continue  Jardiance 10mg daily   - Cardiovascular Risk- continue ezetimibe, fenofibrate,   - Ophthalmology- uptodate, instructed to return Aug 2024, no NPDR.  - Podiatry- patient instructed on routine foot care, follows with podiatry  - Renal- Uptodate,  continue lisinopril, recheck SIOBHAN 3/2025    2) Cardiovascular Risk-   - Continue ezetimibe, fenofibrate.   - Continue icosapent ethyl 1g BID  - Continue aspirin 81mg daily     3) Hypothyroidism, postsurgical  - TSH/FT4 at goal  - Continue LT4 112mcg     4) History of Papillary Thyroid Cancer  - U/S and TG/TGab show biochemically and structurally complete response  - Now 20+ years out (2002)  - enlarged LN, - no recurrence on ultrasound 2022  - Recheck TG level 2025 (last 2023, plan for every other year), last ultrasound 2022, next 2027    RTC- 3 months as scheduled    A total of 32 minutes were spent today 11/13/24 on this visit  including chart review, history and counseling, documentation and other activities as detailed above.                         Again, thank you for allowing me to participate in the care of your patient.        Sincerely,        Odette Weston MD

## 2024-11-13 NOTE — PATIENT INSTRUCTIONS
Basaglar 10u  Novolog 10u with breakfast, 12u with dinner    If AM blood sugars >150 over 3 days in a row, increase to Basaglar 12u

## 2024-11-13 NOTE — PROGRESS NOTES
Clinic Care Coordination Contact  Care Coordination Clinician Chart Review    Situation: Patient chart reviewed by Care Coordinator.       Background: Care Coordination Program started: 11/27/2023. Initial assessment completed and patient-centered care plan(s) were developed with participation from patient. Lead CC handed patient off to CHW for continued outreaches.       Assessment: Per chart review, patient outreach completed by CC CHW on 11/6/24.  Patient is actively working to accomplish goal(s). Patient's goal(s) appropriate and relevant at this time. Patient is not due for updated Plan of Care.  Assessments will be completed annually or as needed/with change of patient status.      Care Plan: Community Resources       Problem: Insufficient In-home support       Note:     Goal Statement: Sammie will continue working with Care Coordination to ensure her needs are met for overall health and wellbeing.  Date Goal Set: 12/5/23  Barriers: Limited finances  Strengths: Strong support system  Date to Achieve By: 12/5/24  Patient expressed understanding of goal: yes  Action steps to achieve this goal:  1. I will continue to follow up with medical team as needed  2. I will work with FRW on discussing and applying for E/W program  4. I will consider options for chore services in my home from CCSW and CHW  5. I will outreach to Care Coordination  for further questions or concerns          Goal: Establish adequate home support       This Visit's Progress: 60% Recent Progress: 50%                               Plan/Recommendations: The patient will continue working with Care Coordination to achieve goal(s) as above. CHW will continue outreaches at minimum every 30 days and will involve Lead CC as needed or if patient is ready to move to Maintenance. Lead CC will continue to monitor CHW outreaches and patient's progress to goal(s) every 6 weeks.     Plan of Care updated and sent to patient: Katie Hennessy   [facility]  Clinic Care Coordinator  [facility]  [phone]  [email or web address]

## 2024-11-20 ENCOUNTER — MYC MEDICAL ADVICE (OUTPATIENT)
Dept: FAMILY MEDICINE | Facility: CLINIC | Age: 79
End: 2024-11-20
Payer: MEDICARE

## 2024-11-20 DIAGNOSIS — I10 BENIGN ESSENTIAL HYPERTENSION: ICD-10-CM

## 2024-11-20 RX ORDER — OLMESARTAN MEDOXOMIL 40 MG/1
40 TABLET ORAL DAILY
Qty: 90 TABLET | Refills: 3 | Status: SHIPPED | OUTPATIENT
Start: 2024-11-20

## 2024-12-03 ENCOUNTER — TELEPHONE (OUTPATIENT)
Dept: CARDIOLOGY | Facility: CLINIC | Age: 79
End: 2024-12-03

## 2024-12-03 ENCOUNTER — VIRTUAL VISIT (OUTPATIENT)
Dept: FAMILY MEDICINE | Facility: CLINIC | Age: 79
End: 2024-12-03
Payer: MEDICARE

## 2024-12-03 DIAGNOSIS — C73 PAPILLARY CARCINOMA OF THYROID (H): ICD-10-CM

## 2024-12-03 DIAGNOSIS — N32.89 BLADDER MASS: Primary | ICD-10-CM

## 2024-12-03 DIAGNOSIS — M85.851 OSTEOPENIA OF RIGHT HIP: ICD-10-CM

## 2024-12-03 DIAGNOSIS — E78.2 MIXED HYPERLIPIDEMIA: ICD-10-CM

## 2024-12-03 PROCEDURE — G2211 COMPLEX E/M VISIT ADD ON: HCPCS | Mod: 95 | Performed by: INTERNAL MEDICINE

## 2024-12-03 PROCEDURE — 99214 OFFICE O/P EST MOD 30 MIN: CPT | Mod: 95 | Performed by: INTERNAL MEDICINE

## 2024-12-03 RX ORDER — FENOFIBRATE 145 MG/1
145 TABLET, COATED ORAL AT BEDTIME
Qty: 90 TABLET | Refills: 3 | Status: SHIPPED | OUTPATIENT
Start: 2024-12-03

## 2024-12-03 RX ORDER — EZETIMIBE 10 MG/1
10 TABLET ORAL EVERY EVENING
Qty: 90 TABLET | Refills: 3 | Status: SHIPPED | OUTPATIENT
Start: 2024-12-03

## 2024-12-03 ASSESSMENT — PATIENT HEALTH QUESTIONNAIRE - PHQ9
10. IF YOU CHECKED OFF ANY PROBLEMS, HOW DIFFICULT HAVE THESE PROBLEMS MADE IT FOR YOU TO DO YOUR WORK, TAKE CARE OF THINGS AT HOME, OR GET ALONG WITH OTHER PEOPLE: NOT DIFFICULT AT ALL
SUM OF ALL RESPONSES TO PHQ QUESTIONS 1-9: 4
SUM OF ALL RESPONSES TO PHQ QUESTIONS 1-9: 4

## 2024-12-03 NOTE — TELEPHONE ENCOUNTER
Spoke with Sammie who is upset regarding the unfortunate need to be rescheduled twice for her appointments with Dr. Bhatia. Discussed with patient that unfortunately these cancellations were related to provider illnesses and need for cath lab coverage. Pt states for the past few weeks she has been experiencing some increased shortness of breath while laying flat. She states she has since been elevating her head to sleep at night. She is wondering if there are any additional tests that could be completed prior to this appointment 12/12. Will route to MD if any further recommendations.

## 2024-12-03 NOTE — PROGRESS NOTES
Sammie is a 79 year old who is being evaluated via a billable video visit.    How would you like to obtain your AVS? MyChart  If the video visit is dropped, the invitation should be resent by: Text to cell phone: [phone]  Will anyone else be joining your video visit? No          Subjective   Sammie is a 79 year old, presenting for the following health issues:  Follow up from Ortho doctor      Video Start Time: 5:15 PM    History of Present Illness       Reason for visit:  Dr Ferrera wanted me to talk with you about what I can take or do to increase my bones strength. Also want to talk to you about getting a Renal CT Scan, and a PET Scan. Pain in my right foot and in my neck, referral to pain clinic.   She is taking medications regularly.     Left Shoulder Pain   Chasity Hodgson recently had follow up in orthopedics and notes that prior shoulder surgery has resulted in some disruption of the joint and now with rubbing of the replacement on the socket.   Osteopenia   Chasity was recommended to start IV zolendronate in April.  She was recommended to treat with IV zolendroante x 3 years.  An infusion order was signed and is pending/held.    Bladder Mass   She did hae follow up ultrasound that showed possible nodule.  CT showed no concerning findings.  Cystoscopy was negative for tumor.           Review of Systems  Constitutional, HEENT, cardiovascular, pulmonary, gi and gu systems are negative, except as otherwise noted.      Objective           Vitals:  No vitals were obtained today due to virtual visit.    Physical Exam   GENERAL: alert and no distress  EYES: Eyes grossly normal to inspection.  No discharge or erythema, or obvious scleral/conjunctival abnormalities.  RESP: No audible wheeze, cough, or visible cyanosis.    SKIN: Visible skin clear. No significant rash, abnormal pigmentation or lesions.  NEURO: Cranial nerves grossly intact.  Mentation and speech appropriate for age.  PSYCH: Appropriate affect, tone, and  pace of words    (N32.89) Bladder mass  (primary encounter diagnosis)  Comment: Recommend follow up in oncology as there is no clear etiology of her bladder mass  Plan: Adult Oncology/Hematology  Referral            (C73) Papillary carcinoma of thyroid (H)  Comment: Oncology referral sent to discuss monitoring and consideration for recurrence  Plan: Adult Oncology/Hematology  Referral,         fenofibrate (TRICOR) 145 MG tablet            (E78.2) Mixed hyperlipidemia  Comment: Refill of fenofibrate   Plan: fenofibrate (TRICOR) 145 MG tablet, ezetimibe         (ZETIA) 10 MG tablet            (M85.851) Osteopenia of right hip  Comment: as above   Plan:      The longitudinal plan of care for the diagnosis(es)/condition(s) as documented were addressed during this visit. Due to the added complexity in care, I will continue to support Sammie in the subsequent management and with ongoing continuity of care.      Video-Visit Details    Type of service:  Video Visit   Video End Time:5:47 PM  Originating Location (pt. Location): Home    Distant Location (provider location):  On-site  Platform used for Video Visit: Jenise  Signed Electronically by: Shola Benoit MD, MD

## 2024-12-04 NOTE — PROGRESS NOTES
Unable to reach patient. Lvm to call back. [employer] message sent to patient.  Jessica Lucio CMA

## 2024-12-12 ENCOUNTER — OFFICE VISIT (OUTPATIENT)
Dept: CARDIOLOGY | Facility: CLINIC | Age: 79
End: 2024-12-12
Attending: INTERNAL MEDICINE
Payer: MEDICARE

## 2024-12-12 VITALS
WEIGHT: 207 LBS | HEIGHT: 63 IN | HEART RATE: 76 BPM | BODY MASS INDEX: 36.68 KG/M2 | SYSTOLIC BLOOD PRESSURE: 158 MMHG | DIASTOLIC BLOOD PRESSURE: 83 MMHG

## 2024-12-12 DIAGNOSIS — I25.10 CORONARY ARTERY DISEASE INVOLVING NATIVE CORONARY ARTERY OF NATIVE HEART WITHOUT ANGINA PECTORIS: ICD-10-CM

## 2024-12-12 NOTE — LETTER
12/12/2024    Shola Benoit MD, MD  [address]  [place] [address]    RE: Chasity Hodgson       Dear Colleague,     I had the pleasure of seeing Chasity SHARLENE Hodgson in the [facility].      Cardiology Clinic          Assessment & Plan         1.  Non-STEMI with presentation and November 2023, angiogram unchanged from 2018 and normal renal angiography   2.  Chronic kidney disease  3.  Hypertension  4.  PAD  5.  Diabetes mellitus  6.  Hyperlipidemia - Statin intolerance   7.  Hypothyroidism  8.  History of DVT  9.  ANNETTE  10.  History of polio age to right foot brace due to deformity  11.  History of colorectal surgery currently has a colostomy, is in need of a minor procedure, outpatient  12.  Left hip surgery due to avascular necrosis at [facility].  Patient did well with no cardiovascular sequelae     Recommendations     1.  Atherosclerotic coronary artery disease : nonocclusive coronary artery disease by cardiac cath.  Stable symptoms.  We will continue with secondary prevention    2.  Hyperlipidemia: Has not been able to tolerate statins due to multiple allergies.  Continue with her current regimen of fenofibrate and zetia, LDL is at goal    3.  Carotid ultrasound was reviewed.  This did not demonstrate any significant stenoses.    4.  Patient is set to undergo surgery on her back next year in February 2025.  We will get an echocardiogram prior to that for further clarification.  Provided no significant change from her baseline she is cleared to undergo the surgery from a cardiac perspective.    5.  We offered our condolences to the passing of her  due to cancer in July of this year.    6.  Return to clinic in 9 months time.        Ankit Bhatia MD         HPI:     Patient is a very pleasant 79 year-old female who has been followed by my colleague Dr. Ortiz for who has recently retired.  I last saw patient in 2023.   Her cardiac history is notable for nonocclusive coronary  artery disease by coronary angiogram in 2018.  This was performed for pre-operative clearance for right shoulder surgery.  She underwent her surgery without difficulty.  Her other past medical history is as noted above.  She has long-standing history of hypertension and is on a multidrug regimen.  She also has chronic kidney disease.  She is in need of a minor colorectal procedure per patient.  This is an outpatient procedure that require some revision of her previous colostomy however through noninvasive measures.  Cardiac wise she is at baseline with no active symptoms.    Since my last visit she has mainly orthopedic issues.  She had avascular necrosis for which she underwent left hip surgery in May 2020.  She had a uneventful hospital course and tells me that there was no mention of any cardiovascular sequelae during her procedure.  Her  unfortunately has had difficulty with his Parkinson's and has now moved into a nursing home in March 2020.  As a result she has downsized and is now in a townhome to her relief.  She was worried about maintaining a house by herself.  Otherwise is taking all of her medications and is here for her yearly visit.     This past year she was admitted for chest pain and had an evaluation with coronary angiography.  No changes were noted when compared to her prior study in 2018.    Since our last visit in 2023 patient has had evaluations in the hospital for mainly orthopedic issues.  She had possibly a bicep tendon tear versus strain and conservative therapy was recommended in August 2024.  In September she had shortness of breath and lower extremity swelling and redness.  Further workup was unrevealing.  She was treated for cellulitis.      Echo 2023  The left ventricle is normal in size.  Left ventricular systolic function is normal. The visual ejection fraction is  60-65%.  Normal left ventricular wall motion  The aortic valve is trileaflet with aortic valve sclerosis. No  hemodynamically  significant valvular aortic stenosis.  The right ventricle is normal in structure, function and size.  Compared to prior study, there is no significant change.      Exam:    LMP  (LMP Unknown)   GENERAL: Healthy, alert and no distress  EYES: Eyes grossly normal to inspection.  No discharge or erythema, or obvious scleral/conjunctival abnormalities.  RESP: No audible wheeze, cough, or visible cyanosis.  No visible retractions or increased work of breathing.   CV:  RRR  Lungs: CTA   Abdomen: + BS soft NT  Ext: no edema   SKIN: Visible skin clear. No significant rash, abnormal pigmentation or lesions.  NEURO: Cranial nerves grossly intact.  Mentation and speech appropriate for age.  PSYCH: Mentation appears normal, affect normal/bright, judgement and insight intact, normal speech and appearance well-groomed.          Thank you for allowing me to participate in the care of your patient.      Sincerely,     Ankit Bhatia MD     [facility]  cc:   Ankit Bhatia MD  [address]  [place] [address]

## 2024-12-12 NOTE — PROGRESS NOTES
Cardiology Clinic          Assessment & Plan         1.  Non-STEMI with presentation and November 2023, angiogram unchanged from 2018 and normal renal angiography   2.  Chronic kidney disease  3.  Hypertension  4.  PAD  5.  Diabetes mellitus  6.  Hyperlipidemia - Statin intolerance   7.  Hypothyroidism  8.  History of DVT  9.  ANNETTE  10.  History of polio age to right foot brace due to deformity  11.  History of colorectal surgery currently has a colostomy, is in need of a minor procedure, outpatient  12.  Left hip surgery due to avascular necrosis at [facility].  Patient did well with no cardiovascular sequelae     Recommendations     1.  Atherosclerotic coronary artery disease : nonocclusive coronary artery disease by cardiac cath.  Stable symptoms.  We will continue with secondary prevention    2.  Hyperlipidemia: Has not been able to tolerate statins due to multiple allergies.  Continue with her current regimen of fenofibrate and zetia, LDL is at goal    3.  Carotid ultrasound was reviewed.  This did not demonstrate any significant stenoses.    4.  Patient is set to undergo surgery on her back next year in February 2025.  We will get an echocardiogram prior to that for further clarification.  Provided no significant change from her baseline she is cleared to undergo the surgery from a cardiac perspective.    5.  We offered our condolences to the passing of her  due to cancer in July of this year.    6.  Return to clinic in 9 months time.        Ankit Bhatia MD         HPI:     Patient is a very pleasant 79 year-old female who has been followed by my colleague Dr. Ortiz for who has recently retired.  I last saw patient in 2023.   Her cardiac history is notable for nonocclusive coronary artery disease by coronary angiogram in 2018.  This was performed for pre-operative clearance for right shoulder surgery.  She underwent her surgery without difficulty.  Her other past medical history is as noted  above.  She has long-standing history of hypertension and is on a multidrug regimen.  She also has chronic kidney disease.  She is in need of a minor colorectal procedure per patient.  This is an outpatient procedure that require some revision of her previous colostomy however through noninvasive measures.  Cardiac wise she is at baseline with no active symptoms.    Since my last visit she has mainly orthopedic issues.  She had avascular necrosis for which she underwent left hip surgery in May 2020.  She had a uneventful hospital course and tells me that there was no mention of any cardiovascular sequelae during her procedure.  Her  unfortunately has had difficulty with his Parkinson's and has now moved into a nursing home in March 2020.  As a result she has downsized and is now in a townhome to her relief.  She was worried about maintaining a house by herself.  Otherwise is taking all of her medications and is here for her yearly visit.     This past year she was admitted for chest pain and had an evaluation with coronary angiography.  No changes were noted when compared to her prior study in 2018.    Since our last visit in 2023 patient has had evaluations in the hospital for mainly orthopedic issues.  She had possibly a bicep tendon tear versus strain and conservative therapy was recommended in August 2024.  In September she had shortness of breath and lower extremity swelling and redness.  Further workup was unrevealing.  She was treated for cellulitis.      Echo 2023  The left ventricle is normal in size.  Left ventricular systolic function is normal. The visual ejection fraction is  60-65%.  Normal left ventricular wall motion  The aortic valve is trileaflet with aortic valve sclerosis. No hemodynamically  significant valvular aortic stenosis.  The right ventricle is normal in structure, function and size.  Compared to prior study, there is no significant change.      Exam:    LMP  (LMP Unknown)    GENERAL: Healthy, alert and no distress  EYES: Eyes grossly normal to inspection.  No discharge or erythema, or obvious scleral/conjunctival abnormalities.  RESP: No audible wheeze, cough, or visible cyanosis.  No visible retractions or increased work of breathing.   CV:  RRR  Lungs: CTA   Abdomen: + BS soft NT  Ext: no edema   SKIN: Visible skin clear. No significant rash, abnormal pigmentation or lesions.  NEURO: Cranial nerves grossly intact.  Mentation and speech appropriate for age.  PSYCH: Mentation appears normal, affect normal/bright, judgement and insight intact, normal speech and appearance well-groomed.

## 2024-12-13 NOTE — PROGRESS NOTES
SUBJECTIVE:   Chasity Hodgson is a 73 year old female who presents to clinic today for the following   health issues:      Diabetes Follow-up    Patient is checking blood sugars: once daily.  Results are as follows:         am - 140         [id number]    Diabetic concerns: None     Symptoms of hypoglycemia (low blood sugar): dizzy     Paresthesias (numbness or burning in feet) or sores: No     Date of last diabetic eye exam: August 2018    Diabetes Management Resources    Hyperlipidemia Follow-Up      Rate your low fat/cholesterol diet?: poor    Taking statin?  No    Other lipid medications/supplements?:  Fenofibrate, without side effects    Hypertension Follow-up      Outpatient blood pressures are not being checked.    Low Salt Diet: no added salt    BP Readings from Last 2 Encounters:   05/06/19 138/50   04/16/19 148/73     Hemoglobin A1C (%)   Date Value   03/29/2019 8.8 (H)   11/05/2018 8.1 (H)     LDL Cholesterol Calculated (mg/dL)   Date Value   03/29/2019 99   01/29/2018 105 (A)     [facility]  CLINIC PROGRESS NOTE    Subjective:  Type 2 diabetes mellitus with other specified complication, without long-term current use of insulin (H)   Chasity Hodgson returns to the clinic for follow up.  She notes that her diet is not great as she has trouble making dinner for both her and her .   She is checking her blood glucose twice per day and she has found readings in the 160-180's.   She has been following with endocrinology.  She did follow up on April 8, 2019.    Benign essential hypertension   Her blood pressure has been well controlled.  She is doing well with lisinopril, atenolol and furosemide three times weekly.   At our last office visit her lisinopril dose was increased to 40 mg.  As a result her blood pressure is improved, but creatinine has worsened to 1.52 up from 1.20.    Past medical history, medications, allergies, social history, family history reviewed and updated in EPIC as  "of 5/17/2019 .    ROS  CONSTITUTIONAL: + fatigue, no unexpected change in weight  SKIN: no worrisome rashes, no worrisome moles   EYES: no acute vision problems or changes  ENT: no ear problems, no mouth problems, no throat problems  RESP: no significant cough, no shortness of breath  CV: no chest pain, no palpitations, improved peripheral edema  GI: no nausea, no vomiting, no constipation, no diarrhea  : no frequency, no dysuria, no hematuria  MS: no claudication, no myalgias, no joint aches  PSYCHIATRIC: no changes in mood or affect      Objective:  Vitals  /62 (BP Location: Right arm, Patient Position: Sitting, Cuff Size: Adult Large)   Pulse 61   Temp 97.5  F (36.4  C) (Oral)   Ht 1.549 m (5' 1\")   Wt 95.3 kg (210 lb)   SpO2 99%   Breastfeeding? No   BMI 39.68 kg/m    GEN: Alert Oriented x3 NAD  HEENT: Atraumatic, normocephalic, neck supple, no thyromegaly,    CV: RRR no murmurs or rubs  PULM: CTA no wheezes or crackles  ABD: Soft, nontender nondistended, no hepatosplenomegally  SKIN: No visible skin lesion or ulcerations  EXT:  , 2+ edema bilateral lower extremities  NEURO: Gait and station normal  PSYCH: Mood good, affect mood congruent    No images are attached to the encounter.  EKG: sinus bradycardia  - no dynamic changes concerning for ischemia    Results for orders placed or performed in visit on 05/16/19 (from the past 24 hour(s))   Albumin Random Urine Quantitative with Creat Ratio   Result Value Ref Range    Creatinine Urine 31 mg/dL    Albumin Urine mg/L <5 mg/L    Albumin Urine mg/g Cr Unable to calculate due to low value 0 - 25 mg/g Cr   CBC with platelets   Result Value Ref Range    WBC 7.9 4.0 - 11.0 10e9/L    RBC Count 3.88 3.8 - 5.2 10e12/L    Hemoglobin 12.1 11.7 - 15.7 g/dL    Hematocrit 35.5 35.0 - 47.0 %    MCV 92 78 - 100 fl    MCH 31.2 26.5 - 33.0 pg    MCHC 34.1 31.5 - 36.5 g/dL    RDW 12.4 10.0 - 15.0 %    Platelet Count 190 150 - 450 10e9/L   Basic metabolic panel "   Result Value Ref Range    Sodium 139 133 - 144 mmol/L    Potassium 5.0 3.4 - 5.3 mmol/L    Chloride 103 94 - 109 mmol/L    Carbon Dioxide 30 20 - 32 mmol/L    Anion Gap 6 3 - 14 mmol/L    Glucose 196 (H) 70 - 99 mg/dL    Urea Nitrogen 40 (H) 7 - 30 mg/dL    Creatinine 1.52 (H) 0.52 - 1.04 mg/dL    GFR Estimate 34 (L) >60 mL/min/[1.73_m2]    GFR Estimate If Black 39 (L) >60 mL/min/[1.73_m2]    Calcium 9.1 8.5 - 10.1 mg/dL       Assessment/Plan:  Patient Instructions   (E11.69) Type 2 diabetes mellitus with other specified complication, without long-term current use of insulin (H)  (primary encounter diagnosis)  Comment: You are doing well with regards to diabetes mellitus, but could do better and we will get labs from endocrinology sent over  Plan:     (I10) Benign essential hypertension  Comment: blood pressure is excellent but may be that your dose of lisinopril is too high and we will reduce this to 20 mg daily and recheck labs again in 2-3 weeks  Plan:      Preop  Comment; You are medically optimized for your upcoming surgery pending EKG.  Labs reviewed and stable.  Hold lisinopril and furosemide on the day of procedure.       Follow up in 2-3 weeks    Disclaimer: This note consists of symbols derived from keyboarding, dictation and/or voice recognition software. As a result, there may be errors in the script that have gone undetected. Please consider this when interpreting information found in this chart.    Shola Benoit MD  [phone]            36.5

## 2024-12-16 NOTE — MR AVS SNAPSHOT
MRN:[id number]                      After Visit Summary   11/29/2018    Chasity Hodgson    MRN: [id number]           Visit Information        Provider Department      11/29/2018 9:00 AM Kavon Pabon [facility] DISCHARGE      MyChart Information     MyChart gives you secure access to your electronic health record. If you see a primary care provider, you can also send messages to your care team and make appointments. If you have questions, please call your primary care clinic.  If you do not have a primary care provider, please call [phone] and they will assist you.        Care EveryWhere ID     This is your Care EveryWhere ID. This could be used by other organizations to access your [place] medical records  [id number]        Equal Access to Services     CORINNE ROBB : Bang Olea, nicole izaguirre, naz cordero, jose kim. So [facility] [phone].    ATENCIÓN: Si habla español, tiene a modi disposición servicios gratuitos de asistencia lingüística. Llame al [phone].    We comply with applicable federal civil rights laws and [place] laws. We do not discriminate on the basis of race, color, national origin, age, disability, sex, sexual orientation, or gender identity.             [employer] message sent from patient:      Good morning,     I was just checking in on an update of the prior authorization. I called [employer] and they have not received anything yet and I’m currently out of my Omnipods. [employer] did give me a direct line prior authorizations it’s [id number].     Also since I am currently out of Omnipods should I start to Lantus to keep my blood sugar numbers down?      Thank you!       -PA is pending for her pods. Do you want her to start Lantus and how much?

## 2024-12-24 ENCOUNTER — HOSPITAL ENCOUNTER (OUTPATIENT)
Dept: CT IMAGING | Facility: CLINIC | Age: 79
Discharge: HOME | End: 2024-12-24
Payer: MEDICARE

## 2024-12-24 DIAGNOSIS — R22.1 MASS OF NECK: ICD-10-CM

## 2024-12-24 DIAGNOSIS — N32.9 DISEASE OF BLADDER: ICD-10-CM

## 2024-12-24 DIAGNOSIS — C73 MALIGNANT NEOPLASM OF THYROID GLAND (H): ICD-10-CM

## 2024-12-24 DIAGNOSIS — R91.8 LUNG NODULES: ICD-10-CM

## 2024-12-24 DIAGNOSIS — Z85.850 HISTORY OF THYROID CANCER: ICD-10-CM

## 2024-12-24 LAB
CREAT BLD-MCNC: 1.6 MG/DL (ref 0.5–1)
CREAT BLD-MCNC: 1.6 MG/DL (ref 0.5–1)
EGFRCR SERPLBLD CKD-EPI 2021: 33 ML/MIN/1.73M2
EGFRCR SERPLBLD CKD-EPI 2021: 33 ML/MIN/1.73M2

## 2024-12-24 PROCEDURE — 82565 ASSAY OF CREATININE: CPT

## 2024-12-24 PROCEDURE — 250N000009 HC RX 250

## 2024-12-24 PROCEDURE — 70491 CT SOFT TISSUE NECK W/DYE: CPT

## 2024-12-24 PROCEDURE — 71260 CT THORAX DX C+: CPT

## 2024-12-24 PROCEDURE — 250N000011 HC RX IP 250 OP 636

## 2024-12-24 PROCEDURE — 74177 CT ABD & PELVIS W/CONTRAST: CPT

## 2024-12-24 RX ORDER — IOPAMIDOL 755 MG/ML
120 INJECTION, SOLUTION INTRAVASCULAR ONCE
Status: COMPLETED | OUTPATIENT
Start: 2024-12-24 | End: 2024-12-24

## 2024-12-24 RX ADMIN — IOPAMIDOL 120 ML: 755 INJECTION, SOLUTION INTRAVENOUS at 10:47

## 2024-12-24 RX ADMIN — SODIUM CHLORIDE 75 ML: 9 INJECTION, SOLUTION INTRAVENOUS at 10:47

## 2024-12-30 ENCOUNTER — PATIENT OUTREACH (OUTPATIENT)
Dept: CARE COORDINATION | Facility: CLINIC | Age: 79
End: 2024-12-30
Payer: MEDICARE

## 2024-12-30 NOTE — LETTER
[facility]  Patient Centered Plan of Care  About Me:        Patient Name:  Chasity Brooke    YOB: 1945  Age:         79 year old   [facility] MRN:    [id number] Telephone Information:  Home Phone [phone]   Mobile [phone]       Address:  [address] [place] [address] Email address:  [email or web address]      Emergency Contact(s)    Name Relationship Lgl Grd Work Phone Home Phone Mobile Phone   1. LETICIA GALLEGOS Daughter No  [phone]   2. JOSE BROOKE Son No   [phone]           Primary language:  English     needed? No   [facility]:  [phone] op. 1  Other communication barriers:No data recorded  Preferred Method of Communication:  Carlos Alberto  Current living arrangement: No data recorded  Mobility Status/ Medical Equipment: No data recorded      Health Maintenance  Health Maintenance Reviewed: No data recorded    My Access Plan  Medical Emergency 911   Primary Clinic Line [facility] [phone]   24 Hour Appointment Line [phone] or  [phone]) (toll-free)   24 Hour Nurse Line [phone] (toll-free)   Preferred Urgent Care No data recorded   Preferred Hospital No data recorded   Preferred Pharmacy [facility] [place], [place] - [address]     Behavioral Health Crisis Line The National Suicide Prevention Lifeline at 1-125.597.9561 or Text/Call 678           My Care Team Members  Patient Care Team         Relationship Specialty Notifications Start End    Shola Benoit MD PCP - General Internal Medicine  10/22/12     Phone: [phone] Pager: [phone] Fax: [phone] [address] [place] [address]    Shola Benoit MD Assigned PCP   10/4/12     Phone: [phone] Pager: [phone] Fax: [phone] [address] [place] [address]    Renetta Meyer MD MD Dermatology  4/17/15     Phone:  [phone] Fax: [phone]         [address] [facility] [address] [facility] [address]    ROMI Roque MD MD INTERNAL MEDICINE - ENDOCRINOLOGY, DIABETES & METABOLISM  2/23/16     Phone: [phone] Fax: [phone]         ENDOCRINE CLINIC [facility] [address] [facility] [address] [facility] [address]    Ulises Pantoja MD MD Gastroenterology  9/12/17     Phone: [phone] Fax: [phone]         [facility] [address] [place] [address]    Kishore Ferrera MD MD Orthopedics  2/23/18     Phone: [phone] Fax: [phone]         [address] [address] [facility] [address]    Tara Cornejo RN Diabetes Educator Diabetes Education  8/22/19     Phone: [phone] Fax: [phone]        Odette Weston MD MD Endocrinology, Diabetes, and Metabolism  9/13/21     Phone: [phone] Fax: [phone]         [facility] [place] [address]    Odette Weston MD Assigned Endocrinology Provider   10/3/21     Phone: [phone] Fax: [phone]         [facility] [place] [address]    Terra Bridges PA-C Physician Assistant Urology  10/14/22     Phone: [phone] Fax: [phone]         [address] [facility] [address] [facility] [address]    Marbella Martinez PA-C Physician Assistant Urology  5/23/23     Phone: [phone] Fax: [phone]          [facility] [address]    Yolanda Tapia PA-C Physician Assistant Endocrinology, Diabetes, and Metabolism  6/9/23     Phone: [phone] Fax: [phone]         [address] [place] [address]    Kaelyn Hennessy [facility] Lead Care Coordinator  Admissions 11/27/23     Phone: [phone]         Renetta Meyer MD Assigned Surgical Provider   12/2/23     Phone: [phone] Fax: [phone]         [address] [place] [address]    Nikia Wan CHW Community Health Worker Primary Care - CC Admissions 12/5/23     Phone: [phone]         Lupe Mcrae,  KOKI Physician Assistant   1/16/24     Phone: [phone] Fax: [phone] [address] [facility] [address]    Florencia Boles RPH Pharmacist Pharmacist  4/15/24     Phone: [phone] Fax: [phone] [address] [facility] [address]    Florencia Boles RPH Assigned MTM Pharmacist   4/23/24     Phone: [phone] Fax: [phone]         [address] [facility] [address]    Ankit Bhatia MD Assigned Heart and Vascular Provider   12/23/24     Phone: [phone] Fax: [phone] [address] [facility] [address] [facility] [address]                My Care Plans  Self Management and Treatment Plan    Care Plan  Care Plan: Community Resources       Problem: Insufficient In-home support       Note:     Goal Statement: Sammie will continue working with Care Coordination to ensure her needs are met for overall health and wellbeing.  Date Goal Set: 12/5/23  Barriers: Limited finances  Strengths: Strong support system  Date to Achieve By: 12/5/24  Patient expressed understanding of goal: yes  Action steps to achieve this goal:  1. I will continue to follow up with medical team as needed  2. I will work with FRW on discussing and applying for E/W program  4. I will consider options for chore services in my home from CCSW and CHW  5. I will outreach to Care Coordination  for further questions or concerns          Goal: Establish adequate home support       This Visit's Progress: 60% Recent Progress: 50%                            Action Plans on File:                       Advance Care Plans/Directives:   Advanced Care Plan/Directives on file: No data recorded  Status of Document(s): No data recorded  Advanced Care Plan/Directives Type: No data recorded           My Medical and Care Information  Problem List   Patient Active Problem List   Diagnosis    Low back pain    Mixed hyperlipidemia    Benign essential hypertension    Fibromyalgia    Allergic rhinitis    ANNETTE  (obstructive sleep apnea)    Cavovarus deformity of foot    History of DVT (deep vein thrombosis)    Hypokalemia    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)    Anemia due to blood loss, acute    Postsurgical hypothyroidism    Type 2 diabetes, HbA1c goal < 7% (H)    Gastroparesis    Papillary carcinoma of thyroid (H)    Alopecia    Pulmonary nodule    Esophageal reflux    Dermatitis    Acne rosacea    Diabetic gastroparesis (H)    Poliomyelitis    Left knee pain    Carotid stenosis    Right shoulder pain    Type 2 diabetes mellitus with other specified complication (H)    Insufficiency, arterial, peripheral (H)    Allergic dermatitis due to other chemical product    Normocytic anemia    Morbid obesity with BMI of 40.0-44.9, adult (H)    Mild major depression (H)    Chronic kidney disease, stage 3b (H)    Renal artery stenosis (H)    Neuropathic pain    Mild major depression (H)    Venous insufficiency    Avascular necrosis of bone of hip, left (H)    Contact dermatitis    Diabetic nephropathy associated with type 2 diabetes mellitus (H)    DVT of upper extremity (deep vein thrombosis) (H)    Dysphagia    Edema of lower extremity    History of colonic polyps    History of pulmonary embolus (PE)    Iron deficiency anemia    Inguinal pain    Moderate protein-calorie malnutrition (H)    Osteoarthritis of left hip    Primary insomnia    Right lower quadrant pain    Status post total shoulder arthroplasty, left    Surgical aftercare, musculoskeletal system    Vitamin D deficiency    Lymphedema    Atypical chest pain    Episodic tension-type headache, not intractable    Blurred vision    Pain of cheek    Abdominal adhesions    Mixed incontinence    OAB (overactive bladder)    Cellulitis of lower extremity, unspecified laterality    Cellulitis    Acute chest pain    Venous stasis ulcer of left lower leg with edema of left lower leg (H)    Post-polio syndrome (H)    Osteopenia of right hip    Spinal stenosis of lumbar region with neurogenic  claudication    Pain of left upper arm    Acute pain of left shoulder    S/P shoulder surgery    Bladder mass      Current Medications:  Please refer to the most recent medication list provided to you by your medical team and reach out to your provider with any questions or to make any corrections.    Care Coordination Start Date: 11/27/2023   Frequency of Care Coordination: monthly, more frequently as needed     Form Last Updated: 12/30/2024

## 2024-12-30 NOTE — PROGRESS NOTES
Clinic Care Coordination Contact  Care Coordination Clinician Chart Review    Situation: Patient chart reviewed by Care Coordinator.       Background: Care Coordination Program started: 11/27/2023. Initial assessment completed and patient-centered care plan(s) were developed with participation from patient. Lead CC handed patient off to CHW for continued outreaches.       Assessment: Per chart review, patient outreach attempted by CHW.  [facility].  CHW will be outreaching again soon.    Care Plan: Community Resources       Problem: Insufficient In-home support       Note:     Goal Statement: Sammie will continue working with Care Coordination to ensure her needs are met for overall health and wellbeing.  Date Goal Set: 12/5/23  Barriers: Limited finances  Strengths: Strong support system  Date to Achieve By: 12/5/24  Patient expressed understanding of goal: yes  Action steps to achieve this goal:  1. I will continue to follow up with medical team as needed  2. I will work with FRW on discussing and applying for E/W program  4. I will consider options for chore services in my home from CCSW and CHW  5. I will outreach to Care Coordination  for further questions or concerns          Goal: Establish adequate home support       This Visit's Progress: 60% Recent Progress: 50%                               Plan/Recommendations: The patient will continue working with Care Coordination to achieve goal(s) as above. CHW will continue outreaches at minimum every 30 days and will involve Lead CC as needed or if patient is ready to move to Maintenance. Lead CC will continue to monitor CHW outreaches and patient's progress to goal(s) every 6 weeks.     Plan of Care updated and sent to patient: Yes, via Carlos Alberto Hennessy  [facility]  Clinic Care Coordinator  [facility]  [phone]  [email or web address]

## 2025-01-02 ENCOUNTER — TRANSFERRED RECORDS (OUTPATIENT)
Dept: HEALTH INFORMATION MANAGEMENT | Facility: CLINIC | Age: 80
End: 2025-01-02
Payer: MEDICARE

## 2025-01-06 ENCOUNTER — INFUSION THERAPY VISIT (OUTPATIENT)
Dept: INFUSION THERAPY | Facility: CLINIC | Age: 80
End: 2025-01-06
Attending: INTERNAL MEDICINE
Payer: MEDICARE

## 2025-01-06 VITALS
OXYGEN SATURATION: 100 % | RESPIRATION RATE: 18 BRPM | TEMPERATURE: 96.9 F | SYSTOLIC BLOOD PRESSURE: 135 MMHG | BODY MASS INDEX: 36.7 KG/M2 | DIASTOLIC BLOOD PRESSURE: 53 MMHG | WEIGHT: 207.2 LBS | HEART RATE: 57 BPM

## 2025-01-06 DIAGNOSIS — M85.851 OSTEOPENIA OF RIGHT HIP: Primary | ICD-10-CM

## 2025-01-06 LAB
ALBUMIN SERPL BCG-MCNC: 4 G/DL (ref 3.5–5.2)
CALCIUM SERPL-MCNC: 8.9 MG/DL (ref 8.8–10.4)
CREAT SERPL-MCNC: 1.43 MG/DL (ref 0.51–0.95)
EGFRCR SERPLBLD CKD-EPI 2021: 37 ML/MIN/1.73M2

## 2025-01-06 PROCEDURE — 82565 ASSAY OF CREATININE: CPT | Performed by: INTERNAL MEDICINE

## 2025-01-06 PROCEDURE — 82310 ASSAY OF CALCIUM: CPT | Performed by: INTERNAL MEDICINE

## 2025-01-06 PROCEDURE — 82040 ASSAY OF SERUM ALBUMIN: CPT | Performed by: INTERNAL MEDICINE

## 2025-01-06 PROCEDURE — 36415 COLL VENOUS BLD VENIPUNCTURE: CPT | Performed by: INTERNAL MEDICINE

## 2025-01-06 RX ORDER — ALBUTEROL SULFATE 90 UG/1
1-2 INHALANT RESPIRATORY (INHALATION)
Start: 2026-01-06

## 2025-01-06 RX ORDER — DIPHENHYDRAMINE HYDROCHLORIDE 50 MG/ML
50 INJECTION INTRAMUSCULAR; INTRAVENOUS
Start: 2026-01-06

## 2025-01-06 RX ORDER — MEPERIDINE HYDROCHLORIDE 25 MG/ML
25 INJECTION INTRAMUSCULAR; INTRAVENOUS; SUBCUTANEOUS EVERY 30 MIN PRN
OUTPATIENT
Start: 2026-01-06

## 2025-01-06 RX ORDER — ALBUTEROL SULFATE 0.83 MG/ML
2.5 SOLUTION RESPIRATORY (INHALATION)
OUTPATIENT
Start: 2026-01-06

## 2025-01-06 RX ORDER — HEPARIN SODIUM (PORCINE) LOCK FLUSH IV SOLN 100 UNIT/ML 100 UNIT/ML
5 SOLUTION INTRAVENOUS
OUTPATIENT
Start: 2026-01-06

## 2025-01-06 RX ORDER — HEPARIN SODIUM,PORCINE 10 UNIT/ML
5-20 VIAL (ML) INTRAVENOUS DAILY PRN
OUTPATIENT
Start: 2026-01-06

## 2025-01-06 RX ORDER — EPINEPHRINE 1 MG/ML
0.3 INJECTION, SOLUTION INTRAMUSCULAR; SUBCUTANEOUS EVERY 5 MIN PRN
OUTPATIENT
Start: 2026-01-06

## 2025-01-06 RX ORDER — METHYLPREDNISOLONE SODIUM SUCCINATE 125 MG/2ML
125 INJECTION INTRAMUSCULAR; INTRAVENOUS
Start: 2026-01-06

## 2025-01-06 RX ORDER — ZOLEDRONIC ACID 0.05 MG/ML
5 INJECTION, SOLUTION INTRAVENOUS ONCE
Start: 2026-01-06

## 2025-01-06 NOTE — PROGRESS NOTES
Infusion Nursing Note:  Chasity Hodgson presents today for Reclast.    Patient seen by provider today: No   present during visit today: Not Applicable.    Note: Denies recent or upcoming dental procedures. Confirms she is taking Calcium and Vit D.      Intravenous Access:  Labs drawn without difficulty.  Peripheral IV placed with ultrasound.  RN tried IV placement x1, ultrasound used for placement on 2nd attempt.     Treatment Conditions:  Results reviewed, labs did NOT meet treatment parameters:   Latest Reference Range & Units 01/06/25 11:35   Creatinine 0.51 - 0.95 mg/dL 1.43 (H)   (H): Data is abnormally high    APRYL Browne spoke with Dr. Benoit regarding elevated creatinine levels. Per Dr. Benoit, hold Reclast today, pt should continue to push PO fluids and come back next week for recheck of labs and infusion.           Discharge Plan:   Discharge instructions reviewed with: Patient.  Patient and/or family verbalized understanding of discharge instructions and all questions answered.  AVS to patient via [employer].  Patient will return 1/14 for next appointment.   Patient discharged in stable condition accompanied by: self.  Departure Mode: Ambulatory.      Suma Murillo RN

## 2025-01-07 ENCOUNTER — HOSPITAL ENCOUNTER (OUTPATIENT)
Dept: CARDIOLOGY | Facility: CLINIC | Age: 80
Discharge: HOME | End: 2025-01-07
Attending: INTERNAL MEDICINE
Payer: MEDICARE

## 2025-01-07 DIAGNOSIS — I25.10 CORONARY ARTERY DISEASE INVOLVING NATIVE CORONARY ARTERY OF NATIVE HEART WITHOUT ANGINA PECTORIS: ICD-10-CM

## 2025-01-07 LAB — LVEF ECHO: NORMAL

## 2025-01-07 PROCEDURE — 93306 TTE W/DOPPLER COMPLETE: CPT

## 2025-01-14 ENCOUNTER — INFUSION THERAPY VISIT (OUTPATIENT)
Dept: INFUSION THERAPY | Facility: CLINIC | Age: 80
End: 2025-01-14
Attending: INTERNAL MEDICINE
Payer: MEDICARE

## 2025-01-14 ENCOUNTER — PATIENT OUTREACH (OUTPATIENT)
Dept: CARE COORDINATION | Facility: CLINIC | Age: 80
End: 2025-01-14

## 2025-01-14 ENCOUNTER — TELEPHONE (OUTPATIENT)
Dept: PHARMACY | Facility: OTHER | Age: 80
End: 2025-01-14

## 2025-01-14 ENCOUNTER — TELEPHONE (OUTPATIENT)
Dept: CARE COORDINATION | Facility: CLINIC | Age: 80
End: 2025-01-14

## 2025-01-14 VITALS
DIASTOLIC BLOOD PRESSURE: 57 MMHG | OXYGEN SATURATION: 99 % | HEART RATE: 66 BPM | SYSTOLIC BLOOD PRESSURE: 145 MMHG | TEMPERATURE: 97.1 F | RESPIRATION RATE: 18 BRPM

## 2025-01-14 DIAGNOSIS — M85.851 OSTEOPENIA OF RIGHT HIP: ICD-10-CM

## 2025-01-14 DIAGNOSIS — M85.851 OSTEOPENIA OF RIGHT HIP: Primary | ICD-10-CM

## 2025-01-14 LAB
ALBUMIN SERPL BCG-MCNC: 3.6 G/DL (ref 3.5–5.2)
CALCIUM SERPL-MCNC: 8.4 MG/DL (ref 8.8–10.4)
CREAT SERPL-MCNC: 1.49 MG/DL (ref 0.51–0.95)
EGFRCR SERPLBLD CKD-EPI 2021: 36 ML/MIN/1.73M2

## 2025-01-14 PROCEDURE — 82310 ASSAY OF CALCIUM: CPT | Performed by: INTERNAL MEDICINE

## 2025-01-14 PROCEDURE — 82040 ASSAY OF SERUM ALBUMIN: CPT | Performed by: INTERNAL MEDICINE

## 2025-01-14 PROCEDURE — 82565 ASSAY OF CREATININE: CPT | Performed by: INTERNAL MEDICINE

## 2025-01-14 PROCEDURE — 36415 COLL VENOUS BLD VENIPUNCTURE: CPT

## 2025-01-14 NOTE — TELEPHONE ENCOUNTER
"MTM Consult Pool received following request from Anna Ragland RN :    \"Patient of Dr. Benoit's, hasn't meet parameters for Reclast two weeks in a row, Dr. Benoit asked me to refer pt to you to discuss other options, please reach out to patient when you are able, I also provided her with what I hope is the right number to get in touch with someone as well.     Thank you\"    Referral placed to get the patient scheduled.    Annemarie FisherD, BCACP  Medication Therapy Management Provider  Phone: [phone]  [email or web address]   "

## 2025-01-14 NOTE — TELEPHONE ENCOUNTER
Dr. Benoit,     Patient is confused on why there was an MTM referral, looking for answers on her creatinine levels.     Please assist, thank you!     Dasia MCCRARY, B.S. [facility] Care Coordination  [facility]:  [place], [place] and [place]  [phone]  [email or web address]

## 2025-01-14 NOTE — PROGRESS NOTES
Infusion Nursing Note:  Chasity Hodgson presents today for reclast-not given.    Patient seen by provider today: No   present during visit today: Not Applicable.    Note: Per Dr. Benoit, hold Reclast dose again today. Pt to follow up with medical therapeutic management to discuss other options per Dr. Benoit. Sammie was unsure who this is, provided contact number and also sent staff message to group. Advised Sammie to call number provided or Dr. Benoit's office by the end of the week if she hasn't heard from anyone.       Intravenous Access:  Lab draw site LAC, Needle type angiocath, Gauge 24.  Labs drawn without difficulty.    Treatment Conditions:    Results reviewed, labs did NOT meet treatment parameters: CrCl-31.9.  Creatinine-1.49        Discharge Plan:   Discharge instructions reviewed with: Patient.  Patient and/or family verbalized understanding of discharge instructions and all questions answered.  AVS to patient via [employer].  Patient will return once different plan is established.   Patient discharged in stable condition accompanied by: self.  Departure Mode: Ambulatory.      Anna Ragland RN

## 2025-01-14 NOTE — TELEPHONE ENCOUNTER
Please let her know that I would recommend that she schedule follow-up with medical therapeutic management to discuss her recent elevation of her creatinine, but more to discuss which medications would be options for her to treat her osteoporosis

## 2025-01-14 NOTE — PROGRESS NOTES
Clinic Care Coordination Contact  Community Health Worker Follow Up    Care Gaps:     Health Maintenance Due   Topic Date Due    URINE DRUG SCREEN  Never done    ZOSTER IMMUNIZATION (2 of 3) 10/24/2016    RSV VACCINE (1 - 1-dose 75+ series) Never done    MEDICARE ANNUAL WELLNESS VISIT  10/12/2023    INFLUENZA VACCINE (1) 09/01/2024    DIABETIC FOOT EXAM  12/11/2024    LIPID  01/22/2025       Scheduled 4/8/25      Care Plan:   Care Plan: Community Resources       Problem: Insufficient In-home support       Note:     Goal Statement: Sammie will continue working with Care Coordination to ensure her needs are met for overall health and wellbeing.  Date Goal Set: 12/5/23  Barriers: Limited finances  Strengths: Strong support system  Date to Achieve By: 12/5/24  Patient expressed understanding of goal: yes  Action steps to achieve this goal:  1. I will continue to follow up with medical team as needed  2. I will work with FRW on discussing and applying for E/W program  4. I will consider options for chore services in my home from CCSW and CHW  5. I will outreach to Care Coordination  for further questions or concerns          Goal: Establish adequate home support       This Visit's Progress: 60% Recent Progress: 50%                            Intervention and Education during outreach: Patient states that she will be having back surgery on 2/27, she was told there is nothing they can do about her arm. Wishes she knew what was going on and why.   Patient states that she was supposed to have an infusion, however her creatinine was too high two times in a row. She is frustrated and confused as to why PCP placed MTM referral and she is not able to talk to them 3 weeks. Wondering if there are other options for strengthening her bones.   Surgeon for upcoming back procedure is through [facility], pt states that she is still using [facility]. She does have a supportive son and daughter in the area.   She has no further  questions or concerns at this time.     CHW Plan: Message sent to PCP, next outreach in one month.     Dasia Knox CHW, B.S. [facility] Care Coordination  [facility]:  [place], [place] and [place]  [phone]  [email or web address]

## 2025-01-15 NOTE — TELEPHONE ENCOUNTER
Outgoing call to Patient.  Patient is not wanting to schedule with MTM as she is not wanting to take anymore medications. Patient was upset with RN for recommending this.  Patient may schedule in three weeks when they call her.    Zaira ROJAS,  Ifrah RN  [facility] Internal Medicine

## 2025-01-16 ENCOUNTER — IMMUNIZATION (OUTPATIENT)
Dept: INTERNAL MEDICINE | Facility: CLINIC | Age: 80
End: 2025-01-16
Payer: MEDICARE

## 2025-01-16 DIAGNOSIS — Z23 NEED FOR PROPHYLACTIC VACCINATION AND INOCULATION AGAINST INFLUENZA: Primary | ICD-10-CM

## 2025-01-16 NOTE — PROGRESS NOTES
Prior to immunization administration, verified patients identity using patient s name and date of birth. Please see Immunization Activity for additional information.     Screening Questionnaire for Adult Immunization    Are you sick today?   No   Do you have allergies to medications, food, a vaccine component or latex?   No   Have you ever had a serious reaction after receiving a vaccination?   No   Do you have a long-term health problem with heart, lung, kidney, or metabolic disease (e.g., diabetes), asthma, a blood disorder, no spleen, complement component deficiency, a cochlear implant, or a spinal fluid leak?  Are you on long-term aspirin therapy?   No   Do you have cancer, leukemia, HIV/AIDS, or any other immune system problem?   No   Do you have a parent, brother, or sister with an immune system problem?   No   In the past 3 months, have you taken medications that affect  your immune system, such as prednisone, other steroids, or anticancer drugs; drugs for the treatment of rheumatoid arthritis, Crohn s disease, or psoriasis; or have you had radiation treatments?   No   Have you had a seizure, or a brain or other nervous system problem?   No   During the past year, have you received a transfusion of blood or blood    products, or been given immune (gamma) globulin or antiviral drug?   No   For women: Are you pregnant or is there a chance you could become       pregnant during the next month?   No   Have you received any vaccinations in the past 4 weeks?   No     Immunization questionnaire answers were all negative.    I have reviewed the following standing orders:   This patient is due and qualifies for the Covid-19 vaccine.     Click here for COVID-19 Standing Order    I have reviewed the vaccines inclusion and exclusion criteria; No concerns regarding eligibility.     Patient instructed to remain in clinic for 15 minutes afterwards, and to report any adverse reactions.     Screening performed by Kathleen Lamb  LPN on 1/16/2025 at 11:11 AM.

## 2025-01-23 ENCOUNTER — TRANSFERRED RECORDS (OUTPATIENT)
Dept: HEALTH INFORMATION MANAGEMENT | Facility: CLINIC | Age: 80
End: 2025-01-23
Payer: MEDICARE

## 2025-01-25 ENCOUNTER — HOSPITAL ENCOUNTER (OUTPATIENT)
Facility: CLINIC | Age: 80
End: 2025-01-25
Attending: STUDENT IN AN ORGANIZED HEALTH CARE EDUCATION/TRAINING PROGRAM | Admitting: STUDENT IN AN ORGANIZED HEALTH CARE EDUCATION/TRAINING PROGRAM
Payer: MEDICARE

## 2025-01-25 ENCOUNTER — ANESTHESIA EVENT (OUTPATIENT)
Dept: SURGERY | Facility: CLINIC | Age: 80
End: 2025-01-25

## 2025-01-25 ENCOUNTER — APPOINTMENT (OUTPATIENT)
Dept: GENERAL RADIOLOGY | Facility: CLINIC | Age: 80
DRG: 493 | End: 2025-01-25
Attending: EMERGENCY MEDICINE
Payer: MEDICARE

## 2025-01-25 ENCOUNTER — HOSPITAL ENCOUNTER (INPATIENT)
Facility: CLINIC | Age: 80
End: 2025-01-25
Attending: EMERGENCY MEDICINE | Admitting: HOSPITALIST
Payer: MEDICARE

## 2025-01-25 ENCOUNTER — ANESTHESIA (OUTPATIENT)
Dept: SURGERY | Facility: CLINIC | Age: 80
End: 2025-01-25

## 2025-01-25 ENCOUNTER — APPOINTMENT (OUTPATIENT)
Dept: CT IMAGING | Facility: CLINIC | Age: 80
DRG: 493 | End: 2025-01-25
Attending: STUDENT IN AN ORGANIZED HEALTH CARE EDUCATION/TRAINING PROGRAM
Payer: MEDICARE

## 2025-01-25 DIAGNOSIS — Z96.619 PERIPROSTHETIC FRACTURE AROUND INTERNAL PROSTHETIC SHOULDER JOINT, INITIAL ENCOUNTER: ICD-10-CM

## 2025-01-25 DIAGNOSIS — Z98.890 S/P SHOULDER SURGERY: ICD-10-CM

## 2025-01-25 DIAGNOSIS — M97.8XXA PERIPROSTHETIC FRACTURE AROUND INTERNAL PROSTHETIC SHOULDER JOINT, INITIAL ENCOUNTER: ICD-10-CM

## 2025-01-25 DIAGNOSIS — M79.603 ARM PAIN: ICD-10-CM

## 2025-01-25 DIAGNOSIS — M54.50 BILATERAL LOW BACK PAIN WITHOUT SCIATICA, UNSPECIFIED CHRONICITY: Primary | ICD-10-CM

## 2025-01-25 LAB
ABO + RH BLD: NORMAL
ANION GAP SERPL CALCULATED.3IONS-SCNC: 11 MMOL/L (ref 7–15)
ANION GAP SERPL CALCULATED.3IONS-SCNC: 12 MMOL/L (ref 7–15)
BASOPHILS # BLD AUTO: 0 10E3/UL (ref 0–0.2)
BASOPHILS NFR BLD AUTO: 0 %
BLD GP AB SCN SERPL QL: NEGATIVE
BUN SERPL-MCNC: 27.6 MG/DL (ref 8–23)
BUN SERPL-MCNC: 28.6 MG/DL (ref 8–23)
CALCIUM SERPL-MCNC: 9.1 MG/DL (ref 8.8–10.4)
CALCIUM SERPL-MCNC: 9.2 MG/DL (ref 8.8–10.4)
CHLORIDE SERPL-SCNC: 103 MMOL/L (ref 98–107)
CHLORIDE SERPL-SCNC: 107 MMOL/L (ref 98–107)
CREAT SERPL-MCNC: 1.13 MG/DL (ref 0.51–0.95)
CREAT SERPL-MCNC: 1.36 MG/DL (ref 0.51–0.95)
EGFRCR SERPLBLD CKD-EPI 2021: 40 ML/MIN/1.73M2
EGFRCR SERPLBLD CKD-EPI 2021: 50 ML/MIN/1.73M2
EOSINOPHIL # BLD AUTO: 0.3 10E3/UL (ref 0–0.7)
EOSINOPHIL NFR BLD AUTO: 3 %
ERYTHROCYTE [DISTWIDTH] IN BLOOD BY AUTOMATED COUNT: 12.3 % (ref 10–15)
ERYTHROCYTE [DISTWIDTH] IN BLOOD BY AUTOMATED COUNT: 12.5 % (ref 10–15)
GLUCOSE BLDC GLUCOMTR-MCNC: 161 MG/DL (ref 70–99)
GLUCOSE BLDC GLUCOMTR-MCNC: 162 MG/DL (ref 70–99)
GLUCOSE BLDC GLUCOMTR-MCNC: 180 MG/DL (ref 70–99)
GLUCOSE BLDC GLUCOMTR-MCNC: 214 MG/DL (ref 70–99)
GLUCOSE BLDC GLUCOMTR-MCNC: 259 MG/DL (ref 70–99)
GLUCOSE SERPL-MCNC: 206 MG/DL (ref 70–99)
GLUCOSE SERPL-MCNC: 251 MG/DL (ref 70–99)
HCO3 SERPL-SCNC: 22 MMOL/L (ref 22–29)
HCO3 SERPL-SCNC: 22 MMOL/L (ref 22–29)
HCT VFR BLD AUTO: 34.3 % (ref 35–47)
HCT VFR BLD AUTO: 37.2 % (ref 35–47)
HGB BLD-MCNC: 11.3 G/DL (ref 11.7–15.7)
HGB BLD-MCNC: 12.1 G/DL (ref 11.7–15.7)
IMM GRANULOCYTES # BLD: 0.1 10E3/UL
IMM GRANULOCYTES NFR BLD: 1 %
LYMPHOCYTES # BLD AUTO: 1.8 10E3/UL (ref 0.8–5.3)
LYMPHOCYTES NFR BLD AUTO: 18 %
MCH RBC QN AUTO: 30.4 PG (ref 26.5–33)
MCH RBC QN AUTO: 31 PG (ref 26.5–33)
MCHC RBC AUTO-ENTMCNC: 32.5 G/DL (ref 31.5–36.5)
MCHC RBC AUTO-ENTMCNC: 32.9 G/DL (ref 31.5–36.5)
MCV RBC AUTO: 94 FL (ref 78–100)
MCV RBC AUTO: 94 FL (ref 78–100)
MONOCYTES # BLD AUTO: 0.7 10E3/UL (ref 0–1.3)
MONOCYTES NFR BLD AUTO: 7 %
NEUTROPHILS # BLD AUTO: 7.2 10E3/UL (ref 1.6–8.3)
NEUTROPHILS NFR BLD AUTO: 71 %
NRBC # BLD AUTO: 0 10E3/UL
NRBC BLD AUTO-RTO: 0 /100
PLATELET # BLD AUTO: 151 10E3/UL (ref 150–450)
PLATELET # BLD AUTO: 84 10E3/UL (ref 150–450)
POTASSIUM SERPL-SCNC: 4.7 MMOL/L (ref 3.4–5.3)
POTASSIUM SERPL-SCNC: 4.9 MMOL/L (ref 3.4–5.3)
RBC # BLD AUTO: 3.65 10E6/UL (ref 3.8–5.2)
RBC # BLD AUTO: 3.98 10E6/UL (ref 3.8–5.2)
SODIUM SERPL-SCNC: 137 MMOL/L (ref 135–145)
SODIUM SERPL-SCNC: 140 MMOL/L (ref 135–145)
SPECIMEN EXP DATE BLD: NORMAL
WBC # BLD AUTO: 10.1 10E3/UL (ref 4–11)
WBC # BLD AUTO: 8.9 10E3/UL (ref 4–11)

## 2025-01-25 PROCEDURE — 73200 CT UPPER EXTREMITY W/O DYE: CPT | Mod: LT

## 2025-01-25 PROCEDURE — 99222 1ST HOSP IP/OBS MODERATE 55: CPT | Mod: A1 | Performed by: HOSPITALIST

## 2025-01-25 PROCEDURE — 86900 BLOOD TYPING SEROLOGIC ABO: CPT | Performed by: HOSPITALIST

## 2025-01-25 PROCEDURE — 85041 AUTOMATED RBC COUNT: CPT | Performed by: HOSPITALIST

## 2025-01-25 PROCEDURE — 80048 BASIC METABOLIC PNL TOTAL CA: CPT | Performed by: EMERGENCY MEDICINE

## 2025-01-25 PROCEDURE — 73060 X-RAY EXAM OF HUMERUS: CPT | Mod: LT

## 2025-01-25 PROCEDURE — 250N000011 HC RX IP 250 OP 636: Performed by: EMERGENCY MEDICINE

## 2025-01-25 PROCEDURE — G0378 HOSPITAL OBSERVATION PER HR: HCPCS

## 2025-01-25 PROCEDURE — 999N000141 HC STATISTIC PRE-PROCEDURE NURSING ASSESSMENT: Performed by: STUDENT IN AN ORGANIZED HEALTH CARE EDUCATION/TRAINING PROGRAM

## 2025-01-25 PROCEDURE — 250N000012 HC RX MED GY IP 250 OP 636 PS 637: Performed by: HOSPITALIST

## 2025-01-25 PROCEDURE — 250N000011 HC RX IP 250 OP 636: Performed by: ANESTHESIOLOGY

## 2025-01-25 PROCEDURE — 36415 COLL VENOUS BLD VENIPUNCTURE: CPT | Performed by: EMERGENCY MEDICINE

## 2025-01-25 PROCEDURE — 36415 COLL VENOUS BLD VENIPUNCTURE: CPT | Performed by: HOSPITALIST

## 2025-01-25 PROCEDURE — 80048 BASIC METABOLIC PNL TOTAL CA: CPT | Performed by: HOSPITALIST

## 2025-01-25 PROCEDURE — 82962 GLUCOSE BLOOD TEST: CPT

## 2025-01-25 PROCEDURE — 250N000013 HC RX MED GY IP 250 OP 250 PS 637: Performed by: HOSPITALIST

## 2025-01-25 PROCEDURE — 96361 HYDRATE IV INFUSION ADD-ON: CPT

## 2025-01-25 PROCEDURE — 85027 COMPLETE CBC AUTOMATED: CPT | Performed by: HOSPITALIST

## 2025-01-25 PROCEDURE — 96376 TX/PRO/DX INJ SAME DRUG ADON: CPT

## 2025-01-25 PROCEDURE — 96375 TX/PRO/DX INJ NEW DRUG ADDON: CPT

## 2025-01-25 PROCEDURE — 250N000011 HC RX IP 250 OP 636: Performed by: HOSPITALIST

## 2025-01-25 PROCEDURE — 85014 HEMATOCRIT: CPT | Performed by: EMERGENCY MEDICINE

## 2025-01-25 PROCEDURE — 258N000003 HC RX IP 258 OP 636: Performed by: HOSPITALIST

## 2025-01-25 PROCEDURE — 96374 THER/PROPH/DIAG INJ IV PUSH: CPT

## 2025-01-25 PROCEDURE — 84295 ASSAY OF SERUM SODIUM: CPT | Performed by: EMERGENCY MEDICINE

## 2025-01-25 PROCEDURE — 85025 COMPLETE CBC W/AUTO DIFF WBC: CPT | Performed by: EMERGENCY MEDICINE

## 2025-01-25 PROCEDURE — 99285 EMERGENCY DEPT VISIT HI MDM: CPT | Mod: 25

## 2025-01-25 PROCEDURE — 82565 ASSAY OF CREATININE: CPT | Performed by: HOSPITALIST

## 2025-01-25 RX ORDER — CARVEDILOL 6.25 MG/1
12.5 TABLET ORAL 2 TIMES DAILY WITH MEALS
Status: DISPENSED | OUTPATIENT
Start: 2025-01-25

## 2025-01-25 RX ORDER — LOSARTAN POTASSIUM 50 MG/1
100 TABLET ORAL DAILY
Status: DISCONTINUED | OUTPATIENT
Start: 2025-01-25 | End: 2025-01-30

## 2025-01-25 RX ORDER — OXYCODONE HYDROCHLORIDE 5 MG/1
5 TABLET ORAL EVERY 4 HOURS PRN
Status: DISCONTINUED | OUTPATIENT
Start: 2025-01-25 | End: 2025-01-25

## 2025-01-25 RX ORDER — FAMOTIDINE 20 MG/1
20 TABLET, FILM COATED ORAL DAILY
Status: DISCONTINUED | OUTPATIENT
Start: 2025-01-26 | End: 2025-01-26

## 2025-01-25 RX ORDER — PANTOPRAZOLE SODIUM 40 MG/1
40 TABLET, DELAYED RELEASE ORAL 2 TIMES DAILY
Status: DISPENSED | OUTPATIENT
Start: 2025-01-25

## 2025-01-25 RX ORDER — ONDANSETRON 2 MG/ML
4 INJECTION INTRAMUSCULAR; INTRAVENOUS EVERY 6 HOURS PRN
Status: DISCONTINUED | OUTPATIENT
Start: 2025-01-25 | End: 2025-01-26

## 2025-01-25 RX ORDER — FAMOTIDINE 40 MG/1
40 TABLET, FILM COATED ORAL DAILY
Status: ON HOLD | COMMUNITY

## 2025-01-25 RX ORDER — SODIUM CHLORIDE 9 MG/ML
INJECTION, SOLUTION INTRAVENOUS CONTINUOUS
Status: ACTIVE | OUTPATIENT
Start: 2025-01-25 | End: 2025-01-25

## 2025-01-25 RX ORDER — HYDROMORPHONE HYDROCHLORIDE 1 MG/ML
0.5 INJECTION, SOLUTION INTRAMUSCULAR; INTRAVENOUS; SUBCUTANEOUS EVERY 30 MIN PRN
Status: DISCONTINUED | OUTPATIENT
Start: 2025-01-25 | End: 2025-01-25

## 2025-01-25 RX ORDER — AMOXICILLIN 250 MG
2 CAPSULE ORAL 2 TIMES DAILY PRN
Status: ACTIVE | OUTPATIENT
Start: 2025-01-25

## 2025-01-25 RX ORDER — AMLODIPINE BESYLATE 2.5 MG/1
TABLET ORAL DAILY PRN
Status: ON HOLD | COMMUNITY

## 2025-01-25 RX ORDER — NALOXONE HYDROCHLORIDE 0.4 MG/ML
0.2 INJECTION, SOLUTION INTRAMUSCULAR; INTRAVENOUS; SUBCUTANEOUS
Status: ACTIVE | OUTPATIENT
Start: 2025-01-25

## 2025-01-25 RX ORDER — NICOTINE POLACRILEX 4 MG
15-30 LOZENGE BUCCAL
Status: ACTIVE | OUTPATIENT
Start: 2025-01-25

## 2025-01-25 RX ORDER — HYDROMORPHONE HYDROCHLORIDE 1 MG/ML
0.5 INJECTION, SOLUTION INTRAMUSCULAR; INTRAVENOUS; SUBCUTANEOUS ONCE
Status: COMPLETED | OUTPATIENT
Start: 2025-01-25 | End: 2025-01-25

## 2025-01-25 RX ORDER — HYDROMORPHONE HCL IN WATER/PF 6 MG/30 ML
0.2 PATIENT CONTROLLED ANALGESIA SYRINGE INTRAVENOUS ONCE
Status: COMPLETED | OUTPATIENT
Start: 2025-01-25 | End: 2025-01-25

## 2025-01-25 RX ORDER — ACETAMINOPHEN 500 MG
1000 TABLET ORAL 2 TIMES DAILY
Status: ON HOLD | COMMUNITY
End: 2025-01-28

## 2025-01-25 RX ORDER — EZETIMIBE 10 MG/1
10 TABLET ORAL EVERY EVENING
Status: DISPENSED | OUTPATIENT
Start: 2025-01-25

## 2025-01-25 RX ORDER — DEXTROSE MONOHYDRATE 25 G/50ML
25-50 INJECTION, SOLUTION INTRAVENOUS
Status: ACTIVE | OUTPATIENT
Start: 2025-01-25

## 2025-01-25 RX ORDER — HYDRALAZINE HYDROCHLORIDE 20 MG/ML
10 INJECTION INTRAMUSCULAR; INTRAVENOUS EVERY 4 HOURS PRN
Status: ACTIVE | OUTPATIENT
Start: 2025-01-25

## 2025-01-25 RX ORDER — CEFAZOLIN SODIUM 2 G/100ML
2 INJECTION, SOLUTION INTRAVENOUS
Status: DISCONTINUED | OUTPATIENT
Start: 2025-01-25 | End: 2025-01-25 | Stop reason: HOSPADM

## 2025-01-25 RX ORDER — HYDROMORPHONE HCL IN WATER/PF 6 MG/30 ML
0.2 PATIENT CONTROLLED ANALGESIA SYRINGE INTRAVENOUS EVERY 10 MIN PRN
Status: DISCONTINUED | OUTPATIENT
Start: 2025-01-25 | End: 2025-01-25

## 2025-01-25 RX ORDER — TRANEXAMIC ACID 10 MG/ML
1 INJECTION, SOLUTION INTRAVENOUS ONCE
Status: DISCONTINUED | OUTPATIENT
Start: 2025-01-25 | End: 2025-01-25 | Stop reason: HOSPADM

## 2025-01-25 RX ORDER — LEVOTHYROXINE SODIUM 112 UG/1
112 TABLET ORAL DAILY
Status: DISPENSED | OUTPATIENT
Start: 2025-01-25

## 2025-01-25 RX ORDER — FAMOTIDINE 20 MG/1
40 TABLET, FILM COATED ORAL DAILY
Status: DISCONTINUED | OUTPATIENT
Start: 2025-01-25 | End: 2025-01-25

## 2025-01-25 RX ORDER — NALOXONE HYDROCHLORIDE 0.4 MG/ML
0.4 INJECTION, SOLUTION INTRAMUSCULAR; INTRAVENOUS; SUBCUTANEOUS
Status: ACTIVE | OUTPATIENT
Start: 2025-01-25

## 2025-01-25 RX ORDER — HYDROMORPHONE HCL IN WATER/PF 6 MG/30 ML
0.2 PATIENT CONTROLLED ANALGESIA SYRINGE INTRAVENOUS
Status: DISCONTINUED | OUTPATIENT
Start: 2025-01-25 | End: 2025-01-27

## 2025-01-25 RX ORDER — HYDROMORPHONE HCL IN WATER/PF 6 MG/30 ML
0.4 PATIENT CONTROLLED ANALGESIA SYRINGE INTRAVENOUS
Status: DISCONTINUED | OUTPATIENT
Start: 2025-01-25 | End: 2025-01-25

## 2025-01-25 RX ORDER — CEFAZOLIN SODIUM 2 G/100ML
2 INJECTION, SOLUTION INTRAVENOUS SEE ADMIN INSTRUCTIONS
Status: DISCONTINUED | OUTPATIENT
Start: 2025-01-25 | End: 2025-01-25 | Stop reason: HOSPADM

## 2025-01-25 RX ORDER — ACETAMINOPHEN 325 MG/1
975 TABLET ORAL 3 TIMES DAILY
Status: DISCONTINUED | OUTPATIENT
Start: 2025-01-25 | End: 2025-01-26

## 2025-01-25 RX ORDER — ONDANSETRON 4 MG/1
4 TABLET, ORALLY DISINTEGRATING ORAL EVERY 6 HOURS PRN
Status: DISCONTINUED | OUTPATIENT
Start: 2025-01-25 | End: 2025-01-26

## 2025-01-25 RX ORDER — AMOXICILLIN 250 MG
1 CAPSULE ORAL 2 TIMES DAILY PRN
Status: ACTIVE | OUTPATIENT
Start: 2025-01-25

## 2025-01-25 RX ORDER — HYDROMORPHONE HCL IN WATER/PF 6 MG/30 ML
0.2 PATIENT CONTROLLED ANALGESIA SYRINGE INTRAVENOUS EVERY 4 HOURS PRN
Status: COMPLETED | OUTPATIENT
Start: 2025-01-25 | End: 2025-01-25

## 2025-01-25 RX ORDER — PROCHLORPERAZINE MALEATE 5 MG/1
5 TABLET ORAL EVERY 6 HOURS PRN
Status: DISCONTINUED | OUTPATIENT
Start: 2025-01-25 | End: 2025-01-26

## 2025-01-25 RX ADMIN — HYDROMORPHONE HYDROCHLORIDE 0.2 MG: 0.2 INJECTION, SOLUTION INTRAMUSCULAR; INTRAVENOUS; SUBCUTANEOUS at 07:48

## 2025-01-25 RX ADMIN — INSULIN ASPART 1 UNITS: 100 INJECTION, SOLUTION INTRAVENOUS; SUBCUTANEOUS at 21:17

## 2025-01-25 RX ADMIN — FENOFIBRATE 134 MG: 160 TABLET, FILM COATED ORAL at 22:19

## 2025-01-25 RX ADMIN — LOSARTAN POTASSIUM 100 MG: 50 TABLET, FILM COATED ORAL at 13:29

## 2025-01-25 RX ADMIN — SODIUM CHLORIDE: 9 INJECTION, SOLUTION INTRAVENOUS at 05:23

## 2025-01-25 RX ADMIN — HYDROMORPHONE HYDROCHLORIDE 0.2 MG: 0.2 INJECTION, SOLUTION INTRAMUSCULAR; INTRAVENOUS; SUBCUTANEOUS at 13:44

## 2025-01-25 RX ADMIN — CARVEDILOL 12.5 MG: 6.25 TABLET, FILM COATED ORAL at 17:50

## 2025-01-25 RX ADMIN — INSULIN ASPART 3 UNITS: 100 INJECTION, SOLUTION INTRAVENOUS; SUBCUTANEOUS at 18:31

## 2025-01-25 RX ADMIN — HYDRALAZINE HYDROCHLORIDE 10 MG: 20 INJECTION INTRAMUSCULAR; INTRAVENOUS at 13:05

## 2025-01-25 RX ADMIN — PANTOPRAZOLE SODIUM 40 MG: 40 TABLET, DELAYED RELEASE ORAL at 20:15

## 2025-01-25 RX ADMIN — CARVEDILOL 12.5 MG: 6.25 TABLET, FILM COATED ORAL at 13:27

## 2025-01-25 RX ADMIN — HYDROMORPHONE HYDROCHLORIDE 0.5 MG: 1 INJECTION, SOLUTION INTRAMUSCULAR; INTRAVENOUS; SUBCUTANEOUS at 04:55

## 2025-01-25 RX ADMIN — HYDROMORPHONE HYDROCHLORIDE 0.5 MG: 1 INJECTION, SOLUTION INTRAMUSCULAR; INTRAVENOUS; SUBCUTANEOUS at 03:24

## 2025-01-25 RX ADMIN — HYDROMORPHONE HYDROCHLORIDE 0.2 MG: 0.2 INJECTION, SOLUTION INTRAMUSCULAR; INTRAVENOUS; SUBCUTANEOUS at 14:52

## 2025-01-25 RX ADMIN — HYDROMORPHONE HYDROCHLORIDE 0.2 MG: 0.2 INJECTION, SOLUTION INTRAMUSCULAR; INTRAVENOUS; SUBCUTANEOUS at 20:21

## 2025-01-25 RX ADMIN — INSULIN ASPART 2 UNITS: 100 INJECTION, SOLUTION INTRAVENOUS; SUBCUTANEOUS at 08:19

## 2025-01-25 RX ADMIN — EMPAGLIFLOZIN 10 MG: 10 TABLET, FILM COATED ORAL at 20:15

## 2025-01-25 RX ADMIN — HYDROMORPHONE HYDROCHLORIDE 0.2 MG: 0.2 INJECTION, SOLUTION INTRAMUSCULAR; INTRAVENOUS; SUBCUTANEOUS at 13:15

## 2025-01-25 RX ADMIN — OXYCODONE HYDROCHLORIDE 2.5 MG: 5 TABLET ORAL at 17:44

## 2025-01-25 RX ADMIN — EMPAGLIFLOZIN 10 MG: 10 TABLET, FILM COATED ORAL at 13:35

## 2025-01-25 RX ADMIN — PANTOPRAZOLE SODIUM 40 MG: 40 TABLET, DELAYED RELEASE ORAL at 13:28

## 2025-01-25 RX ADMIN — ACETAMINOPHEN 975 MG: 325 TABLET, FILM COATED ORAL at 20:14

## 2025-01-25 RX ADMIN — OXYCODONE HYDROCHLORIDE 2.5 MG: 5 TABLET ORAL at 22:19

## 2025-01-25 RX ADMIN — EZETIMIBE 10 MG: 10 TABLET ORAL at 20:21

## 2025-01-25 ASSESSMENT — ACTIVITIES OF DAILY LIVING (ADL)
ADLS_ACUITY_SCORE: 70
ADLS_ACUITY_SCORE: 65
ADLS_ACUITY_SCORE: 64
ADLS_ACUITY_SCORE: 64
ADLS_ACUITY_SCORE: 69
ADLS_ACUITY_SCORE: 70
ADLS_ACUITY_SCORE: 65
ADLS_ACUITY_SCORE: 64
ADLS_ACUITY_SCORE: 64
ADLS_ACUITY_SCORE: 65
ADLS_ACUITY_SCORE: 69
ADLS_ACUITY_SCORE: 64
ADLS_ACUITY_SCORE: 70
ADLS_ACUITY_SCORE: 65
ADLS_ACUITY_SCORE: 70
ADLS_ACUITY_SCORE: 70
ADLS_ACUITY_SCORE: 65
ADLS_ACUITY_SCORE: 65

## 2025-01-25 ASSESSMENT — COLUMBIA-SUICIDE SEVERITY RATING SCALE - C-SSRS
1. IN THE PAST MONTH, HAVE YOU WISHED YOU WERE DEAD OR WISHED YOU COULD GO TO SLEEP AND NOT WAKE UP?: NO
2. HAVE YOU ACTUALLY HAD ANY THOUGHTS OF KILLING YOURSELF IN THE PAST MONTH?: NO
6. HAVE YOU EVER DONE ANYTHING, STARTED TO DO ANYTHING, OR PREPARED TO DO ANYTHING TO END YOUR LIFE?: NO

## 2025-01-25 NOTE — OR NURSING
Pt surgery cancelled due to hardware not available   pt ring cut off due to swelling and put in wallet which is with patient

## 2025-01-25 NOTE — PROGRESS NOTES
Report given to PACU RN.  Cheryle [phone] (family)has pt belongings. Patient has glasses and ring.

## 2025-01-25 NOTE — CONSULTS
[facility]    Orthopedic Consultation    Chasity Hodgson MRN# [id number]   Age: 79 year old YOB: 1945     Date of Admission:  1/25/2025            Assessment and Plan:   Assessment:   79-year-old woman with a closed left periprosthetic humerus fracture from a fall on 1/24/2025      Plan:  Plan for OR for open reduction internal fixation of left periprosthetic humerus fracture, possible humeral component revision today  NPO at midnight  Preop ancef/vanc (MRSA allergy)  The risks and benefits of proceeding with surgery were discussed and questions answered. This included nonoperative alternatives. We discussed the risks of general anesthesia  which include death, heart attack, stroke, urinary tract infection, pneumonia. We discussed the surgical risks of infection, which may require further surgery or antibiotics. We discussed the risk of the procedure not fully or even partially relieving her pain.  We discussed that some patients do not have improvement after the surgery.  We discussed the risk of implant complications, risk of bleeding and transfusion risk. We discussed the risk of infection. Given her success with the hemiarthroplasty component, and with her glenoid bone loss, if revision were necessary, we discussed revising to another hemiarthroplasty with a convertible stem rather than a reverse total shoulder. We discussed the risk of hardware problems or nonunion. We discussed the postoperative course and weightbearing restrictions. We discussed possible sensory and motor injury.          Chief Complaint:   Left shoulder pain         History of Present Illness:   This patient is a 79 year old female who presents with the following condition requiring a hospital admission: Left arm injury  She states that she was getting up to her walker and tripped and fell directly onto her left shoulder.  She is fairly walker dependent due to weakness in her right leg which may be from  postpolio syndrome versus lumbar radiculopathy.  She had immediate pain was brought to the emergency department.  She is found to have a left periprosthetic fracture.  She underwent a left shoulder hemiarthroplasty in 2022 with Dr. Ferrera.  Overall this is done fairly well.  She will have occasional pain in the shoulder but this is well-managed.  She did have previous arthroscopic procedures as well.  She does live independently currently.  She is not on any home anticoagulation.  She denies injury to any other extremity.             Physical Exam:   Vitals have been reviewed  Patient Vitals for the past 24 hrs:   BP Temp Temp src Pulse Resp SpO2   01/25/25 0751 (!) 176/70 97.9  F (36.6  C) Oral 82 20 98 %   01/25/25 0748 (!) 176/70 -- -- 82 -- 100 %   01/25/25 0650 (!) 182/82 97.7  F (36.5  C) Oral -- 22 --   01/25/25 0522 (!) 151/60 -- -- 78 -- 95 %   01/25/25 0440 (!) 175/63 -- -- 79 -- 97 %   01/25/25 0425 (!) 172/69 -- -- 78 -- 96 %   01/25/25 0330 (!) 146/57 -- -- -- 20 95 %   01/25/25 0228 (!) 142/112 97.9  F (36.6  C) Oral 74 22 100 %     No intake or output data in the 24 hours ending 01/25/25 1047    Constitutional: No acute distress  HEENT: Head atraumatic  Respiratory: Unlabored breathing  Musculoskeletal:    RUE:      Atraumatic, full motion without crepitus. Skin intact. Sensation and motor intact in all nerve distributions.  Surgical scar well-healed.   LUE:       Splint in place, clean dry and intact.  Some edema to the fingers, able to wiggle fingers, thumbs up, finger abduction, okay sign.  Sensations intact light touch to all distributions.  Deltoid appears to fire with some pain.  Surgical scar appears well-healed with no signs of infection.  RLE:      Atraumatic, full motion without crepitus. Skin intact. Sensation and motor intact in all nerve distributions  LLE:      Atraumatic, full motion without crepitus. Skin intact. Sensation and motor intact in all nerve distributions          Past Medical  History:     Past Medical History:   Diagnosis Date    Abdominal adhesions 1984, 96,99    s/p lysis    Acne rosacea     Allergic rhinitis     Alopecia     Anemia     CAD (coronary artery disease)     Carotid stenosis     CKD (chronic kidney disease) stage 2, GFR 60-89 ml/min     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)     CPAP (continuous positive airway pressure) dependence     Depression     Diabetic gastroparesis (H)     DM2 (diabetes mellitus, type 2) (H)     DVT of axillary vein, acute right (H)     Fibromyalgia     GERD (gastroesophageal reflux disease)     Hernia of unspecified site of abdominal cavity without mention of obstruction or gangrene     bilateral    History of blood transfusion     10/ 1980    History of thrombophlebitis     HTN (hypertension)     Hypertriglyceridemia     Hypokalemia     Hypothyroidism     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    Mumps     ANNETTE (obstructive sleep apnea)     Osteoarthritis of glenohumeral joint     Papillary carcinoma of thyroid (H)     s/p thyroidectomy - Ruegemer    Poliomyelitis     poor circulation right leg    Postsurgical hypothyroidism     s/p papillary thryoid cancer - Ruegemer    Pulmonary embolism (H)     Pulmonary nodule     S/P carpal tunnel release     bilateral    S/P hardware removal 01/2014    still with lingering foot pain    S/P shoulder surgery     bilateral    Septic joint (H)     right knee    Venous insufficiency              Past Surgical History:     Past Surgical History:   Procedure Laterality Date    AMPUTATE TOE(S)  03/15/2012    Procedure:AMPUTATE TOE(S); Surgeon:RUBEN MOSES; Location: OR    AMPUTATE TOE(S)      APPENDECTOMY  1972    APPENDECTOMY      ARTHRODESIS FOOT  03/15/2012    Procedure:ARTHRODESIS FOOT; RIGHT TRIPLE ARTHRODESIS, FIFTH TOE AMPUTATION, LATERAL LIGAMENT RECONSTRUCTION, TENDON TRANSFER AND RELEASE [MINI C-ARM, ARTHREX 4.5 AND 6.7 STAPLES, BIOCOMPOSITE TENODESIS SCREWS]; Surgeon:RUBEN MOSES; Location: OR     ARTHRODESIS FOOT Right     Right foot triple arthrodesis and removal of hardware    ARTHROSCOPY SHOULDER  06/25/2015    REVISION SUBACROMIAL DECOMPRESSION, EXCISION OF GANGLION CYST, DEBRIDEMENT AND EXCISION OF THE GLENOHUMERAL JOINT GANGLION CYST, CORACOID DECOMPRESSION, POSSIBLE SUBSCAPULARIS REPAIR AND OPEN SUBSCAPULARIS BICEP    ARTHROSCOPY SHOULDER ROTATOR CUFF REPAIR      BIOPSY  Thyroid 2002    BLADDER SURGERY      BOTOX INJECTION MEDICAL      BREAST SURGERY  Biopsy    CHOLECYSTECTOMY      CHOLECYSTECTOMY      COLONOSCOPY  2018    COLOSTOMY  02/07/2012    Procedure:COLOSTOMY; CREATION OF SIGMOID COLOSTOMY AND EXTENSIVE  LYSIS OF ADHESIONS; Surgeon:MONTSERRAT BENDER; Location: OR    COLOSTOMY      CV ANGIOGRAM RENAL BILATERAL Bilateral 11/17/2023    Procedure: Angiogram Renal Bilateral;  Surgeon: Ankit Bhatia MD;  Location:  [facility]    CV CORONARY ANGIOGRAM N/A 11/17/2023    Procedure: Coronary Angiogram;  Surgeon: Ankit Bhatia MD;  Location:  [facility]    CYSTOSCOPY      CYSTOSCOPY, INJECT BOTOX N/A 09/18/2023    Procedure: CYSTOSCOPY, WITH BOTULINUM TOXIN INJECTION;  Surgeon: Mishel Zendejas MD;  [place] [facility] [place]    CYSTOSCOPY, INJECT BOTOX N/A 2/20/2024    Procedure: CYSTOSCOPY, WITH BOTULINUM TOXIN INJECTION;  Surgeon: Mishel Zendejas MD;  [place] [facility] [place]    CYSTOSCOPY, INJECT BOTOX N/A 11/4/2024    Procedure: INTRAVESICAL BOTULINUM TOXIN INJECTION;  Surgeon: Mishel Zendejas MD;  [place] [facility] [place]    CYSTOSCOPY, TRANSURETHRAL RESECTION (TUR) TUMOR BLADDER, COMBINED N/A 11/4/2024    Procedure: EXAM UNDER ANESTHESIA, PELVIS, WITH CYSTOSCOPY;  Surgeon: Mishel Zendejas MD;  [place] [facility] [place]    EYE SURGERY      GENITOURINARY SURGERY  1999    GI SURGERY      weakened rectal sphincter with artificial stimulator    HERNIA REPAIR  1976    HYSTERECTOMY TOTAL ABDOMINAL      LAPAROTOMY, LYSIS ADHESIONS, COMBINED  02/07/2012     Procedure:COMBINED LAPAROTOMY, LYSIS ADHESIONS; Surgeon:MONTSERRAT BENDER; Location:[facility] OR    RELEASE CARPAL TUNNEL      RELEASE TENDON FOOT  03/15/2012    Procedure:RELEASE TENDON FOOT; Surgeon:SUKHDEEP METZ; Location:[facility] OR    REMOVE HARDWARE FOOT  12/13/2012    Procedure: REMOVE HARDWARE FOOT;  RIGHT FOOT REMOVAL OF HARDWARE;  Surgeon: Sukhdeep Metz MD;  Location: [facility] OR    SHOULDER SURGERY  11/12/2020    LEFT SHOULDER HEMIARHTROPLASTY, BICEP TENODESIS    SOFT TISSUE SURGERY  2018, 2020    TENDON RELEASE      foot    THYROIDECTOMY      ZZC FREEING BOWEL ADHESION,ENTEROLYSIS      1986, 1996, 1999    ZZC NONSPECIFIC PROCEDURE      throidectomy    ZZC TOTAL ABDOM HYSTERECTOMY  1980    + BSO             Social History:     Social History     Tobacco Use    Smoking status: Never     Passive exposure: Never    Smokeless tobacco: Never   Substance Use Topics    Alcohol use: Never             Family History:     Family History   Problem Relation Age of Onset    Arthritis Mother     Hypertension Mother     Cerebrovascular Disease Mother     Obesity Mother     Heart Disease Mother         MI's    Hypertension Father     Respiratory Father         Adult RDS    Diabetes Father     Skin Cancer Sister     Arthritis Sister     Cancer Sister     Diabetes Sister     Hypertension Sister     Breast Cancer Sister     Depression Sister     Thyroid Disease Sister     Obesity Sister     Arthritis Sister     Hypertension Sister     Thyroid Disease Sister     Osteoporosis Sister     Obesity Sister     Cancer Sister         colon polup    Hypertension Sister     Osteoporosis Sister     Obesity Sister     Colon Cancer Sister     Lipids Sister     Obesity Sister     Heart Disease Brother         MI at 54    Other Cancer Brother         Lung & bone    Lipids Brother     Hypertension Brother     Diabetes Brother     Hyperlipidemia Brother     Obesity Maternal Grandmother         Dad s mother    Skin Cancer Maternal  Grandmother         skin cancer unknown    Cancer Maternal Grandmother         unknown skin cancer on face    Obesity Paternal Grandmother         Mothers mother    Ovarian Cancer No family hx of     Anesthesia Reaction No family hx of     Deep Vein Thrombosis (DVT) No family hx of              Immunizations:     VACCINE/DOSE   Diptheria   DPT   DTAP   HBIG   Hepatitis A   Hepatitis B   HIB   Influenza   Measles   Meningococcal   MMR   Mumps   Pneumococcal   Polio   Rubella   Small Pox   TDAP   Varicella   Zoster             Allergies:     Allergies   Allergen Reactions    Ketorolac Tromethamine Difficulty breathing     Shortness of breath to tablets only per the patient    Nsaids Difficulty breathing     Increased creatinine    Celecoxib Itching and Rash    Morphine And Codeine Itching     With higher doses. Pt denies this allergy 11/16/2023    Codeine Itching    Conjugated Estrogens     Crestor [Rosuvastatin] Muscle Pain (Myalgia)    No Clinical Screening - See Comments Itching     Fragrance    Vioxx Other (See Comments)     Heart races    Sulfa Antibiotics Rash             Medications:     Current Facility-Administered Medications   Medication Dose Route Frequency Provider Last Rate Last Admin    acetaminophen (TYLENOL) tablet 975 mg  975 mg Oral TID Ramirez Coffey MD        carvedilol (COREG) tablet 12.5 mg  12.5 mg Oral BID w/meals Ramirez Coffey MD        ceFAZolin (ANCEF) 2 g in 100 mL D5W intermittent infusion  2 g Intravenous Pre-Op/Pre-procedure x 1 dose Erich Myrick MD        ceFAZolin (ANCEF) 2 g in 100 mL D5W intermittent infusion  2 g Intravenous See Admin Instructions Erich Myrick MD        glucose gel 15-30 g  15-30 g Oral Q15 Min PRN Ramirez Coffey MD        Or    dextrose 50 % injection 25-50 mL  25-50 mL Intravenous Q15 Min PRN Ramirez Coffey MD        Or    glucagon injection 1 mg  1 mg Subcutaneous Q15 Min PRN Ramirez Coffey MD        empagliflozin (JARDIANCE) tablet 10 mg  10 mg Oral QPM  Ramirez Coffey MD        ezetimibe (ZETIA) tablet 10 mg  10 mg Oral QPM Ramirez Coffey MD        famotidine (PEPCID) tablet 20 mg  20 mg Oral QPM Ramirez Coffey MD        fenofibrate (LOFIBRA) tablet 134 mg  134 mg Oral At Bedtime Ramirez Coffey MD        HYDROmorphone (DILAUDID) injection 0.2 mg  0.2 mg Intravenous Q2H PRN Ramirez Coffey MD   0.2 mg at 01/25/25 0748    HYDROmorphone (DILAUDID) injection 0.4 mg  0.4 mg Intravenous Q2H PRN Ramirez Coffey MD        HYDROmorphone (PF) (DILAUDID) injection 0.5 mg  0.5 mg Intravenous Q30 Min PRN Shola Rodriguez MD        insulin aspart (NovoLOG) injection (RAPID ACTING)  1-7 Units Subcutaneous Q4H Ramirez Coffey MD   2 Units at 01/25/25 0819    levothyroxine (SYNTHROID/LEVOTHROID) tablet 112 mcg  112 mcg Oral Daily Ramirez Coffey MD        losartan (COZAAR) tablet 100 mg  100 mg Oral Daily Ramirez Coffey MD        melatonin tablet 1 mg  1 mg Oral At Bedtime PRN Ramirez Coffey MD        ondansetron (ZOFRAN ODT) ODT tab 4 mg  4 mg Oral Q6H PRN Ramirez Cofefy MD        Or    ondansetron (ZOFRAN) injection 4 mg  4 mg Intravenous Q6H PRN Ramirez Coffey MD        oxyCODONE (ROXICODONE) tablet 5 mg  5 mg Oral Q4H PRN Ramirez Coffey MD        oxyCODONE IR (ROXICODONE) half-tab 2.5 mg  2.5 mg Oral Q4H PRN Ramirez Coffey MD        pantoprazole (PROTONIX) EC tablet 40 mg  40 mg Oral BID Ramirez Coffey MD        prochlorperazine (COMPAZINE) injection 5 mg  5 mg Intravenous Q6H PRN Ramirez Coffey MD        Or    prochlorperazine (COMPAZINE) tablet 5 mg  5 mg Oral Q6H PRN Ramirez Coffey MD        ROPivacaine (NAROPIN) 5 MG/ mg, EPINEPHrine (ADRENALIN) 0.6 mg in sodium chloride 0.9 % 50 mL (ORTHO CHRISTIANE CUSTOM DOSE)   INTRA-ARTICULAR On Call to OR Erich Myrick MD        senna-docusate (SENOKOT-S/PERICOLACE) 8.6-50 MG per tablet 1 tablet  1 tablet Oral BID PRN Ramirez Coffey MD        Or    senna-docusate (SENOKOT-S/PERICOLACE) 8.6-50 MG per tablet 2 tablet  2 tablet Oral BID PRN Ramirez Coffey MD         sodium chloride 0.9 % infusion   Intravenous Continuous Ramirez Coffey MD 75 mL/hr at 01/25/25 0748 Rate Verify at 01/25/25 0748    tranexamic acid 1 g in 100 mL NS IV bag (premix)  1 g Intravenous Once Erich Myrick MD        tranexamic acid 1 g in 100 mL NS IV bag (premix)  1 g Intravenous Once Erich Myrick MD        vancomycin (VANCOCIN) 1,500 mg in 0.9% NaCl 265 mL intermittent infusion  1,500 mg Intravenous Pre-Op/Pre-procedure x 1 dose Erich Myrick MD         Current Outpatient Medications   Medication Sig Dispense Refill    acetaminophen (TYLENOL) 500 MG tablet Take 1,000 mg by mouth 2 times daily.      amLODIPine (NORVASC) 2.5 MG tablet Take by mouth daily as needed (systolic blood pressure > 140 mmHg). Take 0.5 tablets (1.25 mg) as needed prior to MD appointments for systolic blood pressure > 140 mmHg. Take an additional 0.5 tablets (1.25 mg) if systolic blood pressure is still elevated.      BIOTIN PO Take 1 capsule by mouth at bedtime Unknown dose      calcium citrate 250 MG TABS Take 1 tablet by mouth at bedtime      carvedilol (COREG) 12.5 MG tablet Take 1 tablet (12.5 mg) by mouth 2 times daily (with meals) 180 tablet 2    clotrimazole (LOTRIMIN) 1 % cream Apply topically as needed (rash/yeast infection)      cyanocobalamin (VITAMIN B-12) 1000 MCG tablet Take 1,000 mcg by mouth every evening      empagliflozin (JARDIANCE) 10 MG TABS tablet Take 1 tablet (10 mg) by mouth every evening. 90 tablet 3    ezetimibe (ZETIA) 10 MG tablet Take 1 tablet (10 mg) by mouth every evening. 90 tablet 3    famotidine (PEPCID) 40 MG tablet Take 40 mg by mouth daily.      fexofenadine (ALLEGRA) 180 MG tablet Take 180 mg by mouth daily (with lunch) Takes in the afternoon 120 0    furosemide (LASIX) 20 MG tablet Take 1 tablet (20 mg) by mouth daily as needed (lower extremity edema).      icosapent ethyl (VASCEPA) 1 g CAPS capsule Take 2 g by mouth every evening. 180 capsule 3    insulin aspart (NOVOLOG PEN)  100 UNIT/ML pen Inject 10 Units subcutaneously daily (with breakfast) AND 12 Units daily (before supper). 15 mL 4    insulin glargine 100 UNIT/ML pen Inject 10 Units subcutaneously every morning. 15 mL 4    ipratropium (ATROVENT) 0.03 % nasal spray Spray 1 spray in nostril every 8 hours as needed      ketoconazole (NIZORAL) 2 % external cream Apply topically 2 times daily as needed      Lidocaine (LIDOCARE) 4 % Patch Place 1 patch onto the skin as needed To prevent lidocaine toxicity, patient should be patch free for 12 hrs daily.      methocarbamol (ROBAXIN) 500 MG tablet Take 1-2 tablets (500-1,000 mg) by mouth 4 times daily as needed for muscle spasms. 30 tablet 0    minoxidil (LONITEN) 2.5 MG tablet Take 0.25 tablets by mouth twice a week On Monday and Thursday      Multiple vitamin TABS Take 1 tablet by mouth daily      olmesartan (BENICAR) 40 MG tablet Take 1 tablet (40 mg) by mouth daily. 90 tablet 3    ondansetron (ZOFRAN) 4 MG tablet Take 1 tablet (4 mg) by mouth every 6 hours as needed Reported on 3/20/2017 20 tablet 0    oxyCODONE (ROXICODONE) 5 MG tablet Take 1 tablet (5 mg) by mouth every 4 hours as needed for severe pain (IF pain not managed with non-pharmacological and non-opioid interventions). 10 tablet 0    pantoprazole (PROTONIX) 40 MG EC tablet Take 40 mg by mouth 2 times daily      Polyethylene Glycol 400 (BLINK TEARS) 0.25 % GEL Place 1 drop into both eyes at bedtime      senna-docusate (SENOKOT-S/PERICOLACE) 8.6-50 MG tablet Take 1 tablet by mouth daily. (Patient taking differently: Take 1 tablet by mouth daily as needed.)      sucralfate (CARAFATE) 1 GM/10ML suspension Take 1 g by mouth 4 times daily as needed (GERD)      SYNTHROID 112 MCG tablet Take 1 tablet (112 mcg) by mouth daily. 90 tablet 3    tretinoin (RETIN-A) 0.025 % external cream Apply topically nightly as needed (facial lesions) Use every night as tolerated - spot treat lesion      triamcinolone (KENALOG) 0.025 % external  ointment Apply topically 2 times daily as needed for irritation      Vitamin D3 50 mcg (2000 units) tablet Take 1 tablet by mouth every evening Takes in the afternoon      aspirin (ASA) 81 MG EC tablet Take 81 mg by mouth daily (with lunch)      blood glucose (NO BRAND SPECIFIED) test strip Use to test blood sugar 3 times daily or as directed. 200 strip 3    Continuous Glucose Sensor (FREESTYLE BRANDY 3 PLUS SENSOR) MISC 1 each once for 1 dose. Every 15 days 6 each 3    fenofibrate (TRICOR) 145 MG tablet Take 1 tablet (145 mg) by mouth at bedtime. 90 tablet 3    nitroGLYcerin (NITROSTAT) 0.4 MG sublingual tablet Place 1 tablet (0.4 mg) under the tongue every 5 minutes as needed for chest pain (no more than 3 in one hour; after 3rd, call 911.) 25 tablet 3             Review of Systems:   CV: NEGATIVE for chest pain, palpitations or peripheral edema  C: NEGATIVE for fever, chills, change in weight  E/M: NEGATIVE for ear, mouth and throat problems  R: NEGATIVE for significant cough or SOB          Data:   All laboratory data reviewed  Results for orders placed or performed during the hospital encounter of 01/25/25   Humerus XR,  G/E 2 views, left     Status: None    Narrative    EXAM: XR HUMERUS LEFT G/E 2 VIEWS  LOCATION: [facility]  DATE: 1/25/2025    INDICATION: Status post fall. Left shoulder replacement.  COMPARISON: 8/17/2024      Impression    IMPRESSION: An angulated periprosthetic fracture is seen in the proximal shaft of the left humerus with medial apex angulation of fracture fragments.    CT Shoulder Left w/o Contrast     Status: None    Narrative    EXAM: CT SHOULDER LEFT WITHOUT CONTRAST  LOCATION: [facility]  DATE: 01/25/2025    INDICATION: Pain after fall.  COMPARISON: X-ray 01/25/2025.  TECHNIQUE: Noncontrast. Axial, sagittal and coronal thin-section reconstruction. Dose reduction techniques were used.     FINDINGS:     BONES:  -Postoperative changes left  shoulder arthroplasty. There is a fracture of the proximal humerus which begins at the humeral neck and extends distally beyond the tip of the humeral endoprosthesis, extending beyond the tip of the prosthesis a distance of   approximately 4.5 cm. Arden medial angulation deformity. No dislocation. The AC joint is intact with degenerative change and some chronic-appearing irregularity of the tip of the acromion. This does not appear acute. The scapula is intact.    SOFT TISSUES:  -There is some edema within the soft tissues lateral to the deltoid and enlargement and likely hemorrhagic blood product within the proximal triceps musculature but no organized hematoma.      Impression    IMPRESSION:  1.  Postoperative changes of left shoulder arthroplasty.  2.  Fracture of the left humerus beginning at the humeral neck and extending distally beyond the tip of the humeral endoprosthesis a distance of 4.5 cm.  3.  Arden medial angulation deformity at the fracture site.  4.  No dislocation.  5.  Degenerative change at the AC joint.  6.  Edema within the soft tissues superficial to the deltoid.  7.  Enlargement of the triceps likely secondary to a component of inflammation and hemorrhagic blood product but no organized hematoma.       Basic metabolic panel     Status: Abnormal   Result Value Ref Range    Sodium 137 135 - 145 mmol/L    Potassium 4.9 3.4 - 5.3 mmol/L    Chloride 103 98 - 107 mmol/L    Carbon Dioxide (CO2) 22 22 - 29 mmol/L    Anion Gap 12 7 - 15 mmol/L    Urea Nitrogen 28.6 (H) 8.0 - 23.0 mg/dL    Creatinine 1.36 (H) 0.51 - 0.95 mg/dL    GFR Estimate 40 (L) >60 mL/min/1.73m2    Calcium 9.1 8.8 - 10.4 mg/dL    Glucose 251 (H) 70 - 99 mg/dL   CBC with platelets and differential     Status: Abnormal   Result Value Ref Range    WBC Count 10.1 4.0 - 11.0 10e3/uL    RBC Count 3.65 (L) 3.80 - 5.20 10e6/uL    Hemoglobin 11.3 (L) 11.7 - 15.7 g/dL    Hematocrit 34.3 (L) 35.0 - 47.0 %    MCV 94 78 - 100 fL    MCH 31.0 26.5  - 33.0 pg    MCHC 32.9 31.5 - 36.5 g/dL    RDW 12.5 10.0 - 15.0 %    Platelet Count 84 (L) 150 - 450 10e3/uL    % Neutrophils 71 %    % Lymphocytes 18 %    % Monocytes 7 %    % Eosinophils 3 %    % Basophils 0 %    % Immature Granulocytes 1 %    NRBCs per 100 WBC 0 <1 /100    Absolute Neutrophils 7.2 1.6 - 8.3 10e3/uL    Absolute Lymphocytes 1.8 0.8 - 5.3 10e3/uL    Absolute Monocytes 0.7 0.0 - 1.3 10e3/uL    Absolute Eosinophils 0.3 0.0 - 0.7 10e3/uL    Absolute Basophils 0.0 0.0 - 0.2 10e3/uL    Absolute Immature Granulocytes 0.1 <=0.4 10e3/uL    Absolute NRBCs 0.0 10e3/uL   Basic metabolic panel     Status: Abnormal   Result Value Ref Range    Sodium 140 135 - 145 mmol/L    Potassium 4.7 3.4 - 5.3 mmol/L    Chloride 107 98 - 107 mmol/L    Carbon Dioxide (CO2) 22 22 - 29 mmol/L    Anion Gap 11 7 - 15 mmol/L    Urea Nitrogen 27.6 (H) 8.0 - 23.0 mg/dL    Creatinine 1.13 (H) 0.51 - 0.95 mg/dL    GFR Estimate 50 (L) >60 mL/min/1.73m2    Calcium 9.2 8.8 - 10.4 mg/dL    Glucose 206 (H) 70 - 99 mg/dL   CBC with platelets     Status: Normal   Result Value Ref Range    WBC Count 8.9 4.0 - 11.0 10e3/uL    RBC Count 3.98 3.80 - 5.20 10e6/uL    Hemoglobin 12.1 11.7 - 15.7 g/dL    Hematocrit 37.2 35.0 - 47.0 %    MCV 94 78 - 100 fL    MCH 30.4 26.5 - 33.0 pg    MCHC 32.5 31.5 - 36.5 g/dL    RDW 12.3 10.0 - 15.0 %    Platelet Count 151 150 - 450 10e3/uL   Glucose by meter     Status: Abnormal   Result Value Ref Range    GLUCOSE BY METER POCT 214 (H) 70 - 99 mg/dL   Adult Type and Screen     Status: None   Result Value Ref Range    ABO/RH(D) A POS     Antibody Screen Negative Negative    SPECIMEN EXPIRATION DATE [id number]    CBC with platelets differential     Status: Abnormal    Narrative    The following orders were created for panel order CBC with platelets differential.  Procedure                               Abnormality         Status                     ---------                               -----------          ------                     CBC with platelets and d...[[id number]]  Abnormal            Final result               RBC and Platelet Morphology[[id number]]                                                   Please view results for these tests on the individual orders.   ABO/Rh type and screen     Status: None    Narrative    The following orders were created for panel order ABO/Rh type and screen.  Procedure                               Abnormality         Status                     ---------                               -----------         ------                     Adult Type and Screen[[id number]]                            Final result                 Please view results for these tests on the individual orders.          Erich Myrick MD  Orthopedic Surgery  [facility]

## 2025-01-25 NOTE — ED PROVIDER NOTES
Emergency Department Note      History of Present Illness     Chief Complaint   Arm Pain (Mechanical fall at home, pain mid humerus, 100 Fent IM enroute)      HPI   Chasity Hodgson is a 79 year old female who presents by ambulance after mechanical fall at home.  Patient states she was bending to move something on the floor at home and lost her balance.  Patient fell onto her walker.  Has a history of a prior shoulder replacement developed acute pain in the shoulder.  Denies head trauma or loss of consciousness.  Is not on any anticoagulation.  Has had severe pain in her arm and ambulance was called.  Was given 100 mics of fentanyl en route without relief and presents to the emergency room for assessment.    Independent Historian   None    Review of External Notes       Past Medical History     Medical History and Problem List   Past Medical History:   Diagnosis Date    Abdominal adhesions 1984, 96,99    Acne rosacea     Allergic rhinitis     Alopecia     Anemia     CAD (coronary artery disease)     Carotid stenosis     CKD (chronic kidney disease) stage 2, GFR 60-89 ml/min     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)     CPAP (continuous positive airway pressure) dependence     Depression     Diabetic gastroparesis (H)     DM2 (diabetes mellitus, type 2) (H)     DVT of axillary vein, acute right (H)     Fibromyalgia     GERD (gastroesophageal reflux disease)     Hernia of unspecified site of abdominal cavity without mention of obstruction or gangrene     History of blood transfusion     History of thrombophlebitis     HTN (hypertension)     Hypertriglyceridemia     Hypokalemia     Hypothyroidism     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    Mumps     ANNETTE (obstructive sleep apnea)     Osteoarthritis of glenohumeral joint     Papillary carcinoma of thyroid (H)     Poliomyelitis     Postsurgical hypothyroidism     Pulmonary embolism (H)     Pulmonary nodule     S/P carpal tunnel release     S/P hardware removal 01/2014    S/P shoulder  surgery     Septic joint (H)     Venous insufficiency        Medications   acetaminophen (TYLENOL) 500 MG tablet  amLODIPine (NORVASC) 2.5 MG tablet  amoxicillin (AMOXIL) 500 MG capsule  aspirin (ASA) 81 MG EC tablet  BIOTIN PO  blood glucose (NO BRAND SPECIFIED) test strip  calcium citrate 250 MG TABS  carvedilol (COREG) 12.5 MG tablet  clotrimazole (LOTRIMIN) 1 % cream  Continuous Glucose Sensor (FREESTYLE BRANDY 3 PLUS SENSOR) MISC  cyanocobalamin (VITAMIN B-12) 1000 MCG tablet  empagliflozin (JARDIANCE) 10 MG TABS tablet  ezetimibe (ZETIA) 10 MG tablet  famotidine (PEPCID) 20 MG tablet  fenofibrate (TRICOR) 145 MG tablet  fexofenadine (ALLEGRA) 180 MG tablet  furosemide (LASIX) 20 MG tablet  icosapent ethyl (VASCEPA) 1 g CAPS capsule  insulin aspart (NOVOLOG PEN) 100 UNIT/ML pen  insulin glargine 100 UNIT/ML pen  ipratropium (ATROVENT) 0.03 % nasal spray  ketoconazole (NIZORAL) 2 % external cream  Lidocaine (LIDOCARE) 4 % Patch  methocarbamol (ROBAXIN) 500 MG tablet  minoxidil (LONITEN) 2.5 MG tablet  Multiple vitamin TABS  nitroGLYcerin (NITROSTAT) 0.4 MG sublingual tablet  olmesartan (BENICAR) 40 MG tablet  ondansetron (ZOFRAN) 4 MG tablet  oxyCODONE (ROXICODONE) 5 MG tablet  pantoprazole (PROTONIX) 40 MG EC tablet  polyethylene glycol (MIRALAX) 17 g packet  Polyethylene Glycol 400 (BLINK TEARS) 0.25 % GEL  senna-docusate (SENOKOT-S/PERICOLACE) 8.6-50 MG tablet  sucralfate (CARAFATE) 1 GM/10ML suspension  SYNTHROID 112 MCG tablet  tretinoin (RETIN-A) 0.025 % external cream  triamcinolone (KENALOG) 0.025 % external ointment  Vitamin D3 50 mcg (2000 units) tablet        Surgical History   Past Surgical History:   Procedure Laterality Date    AMPUTATE TOE(S)  03/15/2012    Procedure:AMPUTATE TOE(S); Surgeon:RUBEN MOSES; Location:[facility] OR    AMPUTATE TOE(S)      APPENDECTOMY  1972    APPENDECTOMY      ARTHRODESIS FOOT  03/15/2012    Procedure:ARTHRODESIS FOOT; RIGHT TRIPLE ARTHRODESIS, FIFTH TOE  AMPUTATION, LATERAL LIGAMENT RECONSTRUCTION, TENDON TRANSFER AND RELEASE [MINI C-ARM, ARTHREX 4.5 AND 6.7 STAPLES, BIOCOMPOSITE TENODESIS SCREWS]; Surgeon:RUBEN MOSES; Location: OR    ARTHRODESIS FOOT Right     Right foot triple arthrodesis and removal of hardware    ARTHROSCOPY SHOULDER  06/25/2015    REVISION SUBACROMIAL DECOMPRESSION, EXCISION OF GANGLION CYST, DEBRIDEMENT AND EXCISION OF THE GLENOHUMERAL JOINT GANGLION CYST, CORACOID DECOMPRESSION, POSSIBLE SUBSCAPULARIS REPAIR AND OPEN SUBSCAPULARIS BICEP    ARTHROSCOPY SHOULDER ROTATOR CUFF REPAIR      BIOPSY  Thyroid 2002    BLADDER SURGERY      BOTOX INJECTION MEDICAL      BREAST SURGERY  Biopsy    CHOLECYSTECTOMY      CHOLECYSTECTOMY      COLONOSCOPY  2018    COLOSTOMY  02/07/2012    Procedure:COLOSTOMY; CREATION OF SIGMOID COLOSTOMY AND EXTENSIVE  LYSIS OF ADHESIONS; Surgeon:MONTSERRAT BENDER; Location: OR    COLOSTOMY      CV ANGIOGRAM RENAL BILATERAL Bilateral 11/17/2023    Procedure: Angiogram Renal Bilateral;  Surgeon: Ankit Bhatia MD;  Location:  [facility]    CV CORONARY ANGIOGRAM N/A 11/17/2023    Procedure: Coronary Angiogram;  Surgeon: Ankit Bhatia MD;  Location:  [facility]    CYSTOSCOPY      CYSTOSCOPY, INJECT BOTOX N/A 09/18/2023    Procedure: CYSTOSCOPY, WITH BOTULINUM TOXIN INJECTION;  Surgeon: Mishel Zendejas MD;  Location: [facility] [place]    CYSTOSCOPY, INJECT BOTOX N/A 2/20/2024    Procedure: CYSTOSCOPY, WITH BOTULINUM TOXIN INJECTION;  Surgeon: Mishel Zendejas MD;  Location: [facility] [place]    CYSTOSCOPY, INJECT BOTOX N/A 11/4/2024    Procedure: INTRAVESICAL BOTULINUM TOXIN INJECTION;  Surgeon: Mishel Zendejas MD;  Location: [facility] [place]    CYSTOSCOPY, TRANSURETHRAL RESECTION (TUR) TUMOR BLADDER, COMBINED N/A 11/4/2024    Procedure: EXAM UNDER ANESTHESIA, PELVIS, WITH CYSTOSCOPY;  Surgeon: Mishle Zendejas MD;  Location: [facility]    EYE SURGERY      GENITOURINARY  SURGERY  1999    GI SURGERY      weakened rectal sphincter with artificial stimulator    HERNIA REPAIR  1976    HYSTERECTOMY TOTAL ABDOMINAL      LAPAROTOMY, LYSIS ADHESIONS, COMBINED  02/07/2012    Procedure:COMBINED LAPAROTOMY, LYSIS ADHESIONS; Surgeon:MONTSERRAT BENDER; Location:[facility] OR    RELEASE CARPAL TUNNEL      RELEASE TENDON FOOT  03/15/2012    Procedure:RELEASE TENDON FOOT; Surgeon:SUKHDEEP METZ; Location:[facility] OR    REMOVE HARDWARE FOOT  12/13/2012    Procedure: REMOVE HARDWARE FOOT;  RIGHT FOOT REMOVAL OF HARDWARE;  Surgeon: Sukhdeep Metz MD;  Location: [facility] OR    SHOULDER SURGERY  11/12/2020    LEFT SHOULDER HEMIARHTROPLASTY, BICEP TENODESIS    SOFT TISSUE SURGERY  2018, 2020    TENDON RELEASE      foot    THYROIDECTOMY      ZZC FREEING BOWEL ADHESION,ENTEROLYSIS      1986, 1996, 1999    ZZC NONSPECIFIC PROCEDURE      throidectomy    ZZC TOTAL ABDOM HYSTERECTOMY  1980    + BSO       Physical Exam     Patient Vitals for the past 24 hrs:   BP Temp Temp src Pulse Resp SpO2   01/25/25 0228 (!) 142/112 97.9  F (36.6  C) Oral 74 22 100 %     Physical Exam  Constitutional:       Appearance: She is obese.   HENT:      Head: Normocephalic.      Right Ear: Tympanic membrane normal.      Left Ear: Tympanic membrane normal.   Cardiovascular:      Rate and Rhythm: Normal rate.   Pulmonary:      Effort: Pulmonary effort is normal.   Abdominal:      General: Abdomen is flat. Bowel sounds are normal.   Musculoskeletal:      Cervical back: Normal range of motion. No tenderness.      Comments: Obvious deformity of the left mid forearm.  With swelling.  Intact radial and ulnar pulse.  Severe pain with any attempted motion of the left arm.  No open wound.   Skin:     General: Skin is warm.   Neurological:      General: No focal deficit present.      Mental Status: She is alert and oriented to person, place, and time.   Psychiatric:         Mood and Affect: Mood normal.           Diagnostics     Lab Results    Labs Ordered and Resulted from Time of ED Arrival to Time of ED Departure   BASIC METABOLIC PANEL - Abnormal       Result Value    Sodium 137      Potassium 4.9      Chloride 103      Carbon Dioxide (CO2) 22      Anion Gap 12      Urea Nitrogen 28.6 (*)     Creatinine 1.36 (*)     GFR Estimate 40 (*)     Calcium 9.1      Glucose 251 (*)    CBC WITH PLATELETS AND DIFFERENTIAL - Abnormal    WBC Count 10.1      RBC Count 3.65 (*)     Hemoglobin 11.3 (*)     Hematocrit 34.3 (*)     MCV 94      MCH 31.0      MCHC 32.9      RDW 12.5      Platelet Count 84 (*)     % Neutrophils 71      % Lymphocytes 18      % Monocytes 7      % Eosinophils 3      % Basophils 0      % Immature Granulocytes 1      NRBCs per 100 WBC 0      Absolute Neutrophils 7.2      Absolute Lymphocytes 1.8      Absolute Monocytes 0.7      Absolute Eosinophils 0.3      Absolute Basophils 0.0      Absolute Immature Granulocytes 0.1      Absolute NRBCs 0.0     BASIC METABOLIC PANEL - Abnormal    Sodium 140      Potassium 4.7      Chloride 107      Carbon Dioxide (CO2) 22      Anion Gap 11      Urea Nitrogen 27.6 (*)     Creatinine 1.13 (*)     GFR Estimate 50 (*)     Calcium 9.2      Glucose 206 (*)    GLUCOSE BY METER - Abnormal    GLUCOSE BY METER POCT 214 (*)    GLUCOSE BY METER - Abnormal    GLUCOSE BY METER POCT 161 (*)    CBC WITH PLATELETS - Normal    WBC Count 8.9      RBC Count 3.98      Hemoglobin 12.1      Hematocrit 37.2      MCV 94      MCH 30.4      MCHC 32.5      RDW 12.3      Platelet Count 151     GLUCOSE MONITOR NURSING POCT   GLUCOSE MONITOR NURSING POCT   TYPE AND SCREEN, ADULT    ABO/RH(D) A POS      Antibody Screen Negative      SPECIMEN EXPIRATION DATE [id number]     ABO/RH TYPE AND SCREEN       Imaging   CT Shoulder Left w/o Contrast   Final Result   IMPRESSION:   1.  Postoperative changes of left shoulder arthroplasty.   2.  Fracture of the left humerus beginning at the humeral neck and extending distally beyond the tip of the  humeral endoprosthesis a distance of 4.5 cm.   3.  [place] medial angulation deformity at the fracture site.   4.  No dislocation.   5.  Degenerative change at the AC joint.   6.  Edema within the soft tissues superficial to the deltoid.   7.  Enlargement of the triceps likely secondary to a component of inflammation and hemorrhagic blood product but no organized hematoma.            Humerus XR,  G/E 2 views, left   Final Result   IMPRESSION: An angulated periprosthetic fracture is seen in the proximal shaft of the left humerus with medial apex angulation of fracture fragments.       Radius/Ulna XR,  PA &LAT, left    (Results Pending)         Independent Interpretation   X-ray left humerus shows periprosthetic fracture    ED Course      Medications Administered   Medications   HYDROmorphone (PF) (DILAUDID) injection 0.5 mg (has no administration in time range)       Procedures   Procedures     Discussion of Management   Admitting Hospitalist,      ED Course        Additional Documentation  None    Medical Decision Making / Diagnosis     CMS Diagnoses: None    MIPS       None    MDM   Chasity Hodgson is a 79 year old female presents after mechanical fall at home x-rays confirm periprosthetic fracture with angulation.  He was placed in a long-arm splint in the emergency room.  Due to patient's use of a walker at home now acute fracture of her arm with inability to care for self recommend admission.  Will recommend inpatient orthopedic consultation.  Care was discussed with the hospitalist was admitted as an observation case but may require inpatient if surgery is definitively recommended by the orthopedic on-call.    Disposition   The patient was admitted to the hospital.     Diagnosis     ICD-10-CM    1. Bilateral low back pain without sciatica, unspecified chronicity  M54.50 Primary Care - Care Coordination Referral      2. Periprosthetic fracture around internal prosthetic shoulder joint, initial encounter  M97.8XXA  Case Request: OPEN REDUCTION INTERNAL FIXATION, FRACTURE, HUMERUS, PROXIMAL, REVISION, TOTAL ARTHROPLASTY, SHOULDER    [id number] Case Request: OPEN REDUCTION INTERNAL FIXATION, FRACTURE, HUMERUS, PROXIMAL, REVISION, TOTAL ARTHROPLASTY, SHOULDER     CANCELED: Case Request: OPEN REDUCTION INTERNAL FIXATION, FRACTURE, HUMERUS, PROXIMAL     CANCELED: Case Request: OPEN REDUCTION INTERNAL FIXATION, FRACTURE, HUMERUS, PROXIMAL     CANCELED: Case Request: OPEN REDUCTION INTERNAL FIXATION, FRACTURE, HUMERUS, PROXIMAL     CANCELED: Case Request: OPEN REDUCTION INTERNAL FIXATION, FRACTURE, HUMERUS, PROXIMAL           Discharge Medications   New Prescriptions    No medications on file         MD Michael Benitez, Shola Frazier MD  01/26/25 0502

## 2025-01-25 NOTE — H&P
[facility]    History and Physical  Hospitalist       Date of Admission:  1/25/2025    Assessment & Plan   Chasity Hodgson is a 79 year old female with a past medical history of nonocclusive CAD, hyperlipidemia, hypertension, PAD, diabetes mellitus, hyperlipidemia, hypothyroidism, history of DVT not on blood thinners, ANNETTE who presents with arm pain.    # Mechanical fall complicated by left periprosthetic humeral fracture: Patient lives in a [facility] herself.  She states that she was putting a flowerpot down when she lost her balance and fell against her walker onto her left side.  She hit her left arm against the walker.  She had immediate pain to the left shoulder and arm radiating down to the wrist.  She did not lose consciousness.  She did not hit her head.  No palpitations or chest pain.  She does have a history of left shoulder replacement.  She denies any recent illness.  She is not on blood thinners.  -ED, patient afebrile and hemodynamically stable.  BMP with creatinine at baseline 1.36.  CBC unremarkable.  X-ray of the humerus showed an angulated periprosthetic fracture in the proximal shaft of the left humerus.  Patient was given pain medications.  -X-ray of the radius/ulna ordered in the ED to r/o more distal fracture as well.  Follow-up.  -IV fluid, as needed pain medications.  N.p.o. pending orthopedics evaluation.  Patient is being placed in the splint in the ED.  -NWB son RAMON    # Diabetes mellitus: It looks like she is on Lantus 10 units in the morning along with mealtime NovoLog.  We will have her on sliding scale insulin while n.p.o. pending orthopedics eval.  #Non-obstructive CAD, HTN, HL: We will continue her PTA carvedilol, ARB.  Continue fenofibrate and zetia.    #Hypothyroidism: Continue synthroid  #GERD: Continue PPI and pepcid  #Obesity, ANNTETE: CPAP. Complicates cares    DVT Prophylaxis: Pneumatic Compression Devices  Code Status: Full Code  Medically Ready for  Discharge: Anticipated Tomorrow pending ortho opinion.  May need placement.     Ramirez Coffey MD    Primary Care Physician   Shola Benoit MD    Chief Complaint   Left arm pain    History is obtained from the patient, patient's chart and discussed with ER physician    History of Present Illness   Chasity Hodgson is a 79 year old female with a past medical history of nonocclusive CAD, hyperlipidemia, hypertension, PAD, diabetes mellitus, hyperlipidemia, hypothyroidism, history of DVT not on blood thinners, ANNETTE who presents with arm pain.    Patient lives in a [facility] by herself.  She states that she was putting a flowerpot down when she lost her balance and fell against her walker onto her left side.  She hit her left arm against the walker.  She had immediate pain to the left shoulder and arm radiating down to the wrist.  She did not lose consciousness.  She did not hit her head.  No palpitations or chest pain.  She does have a history of left shoulder replacement.  She denies any recent illness.  She is not on blood thinners.    In the ED, patient afebrile and hemodynamically stable.  BMP with creatinine at baseline 1.36.  CBC unremarkable.  X-ray of the humerus showed an angulated periprosthetic fracture in the proximal shaft of the left humerus.  Patient was given pain medications.    Past Medical History    I have reviewed this patient's medical history and updated it with pertinent information if needed.   Past Medical History:   Diagnosis Date    Abdominal adhesions 1984, 96,99    s/p lysis    Acne rosacea     Allergic rhinitis     Alopecia     Anemia     CAD (coronary artery disease)     Carotid stenosis     CKD (chronic kidney disease) stage 2, GFR 60-89 ml/min     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)     CPAP (continuous positive airway pressure) dependence     Depression     Diabetic gastroparesis (H)     DM2 (diabetes mellitus, type 2) (H)     DVT of axillary vein, acute right (H)     Fibromyalgia     GERD  (gastroesophageal reflux disease)     Hernia of unspecified site of abdominal cavity without mention of obstruction or gangrene     bilateral    History of blood transfusion     10/ 1980    History of thrombophlebitis     HTN (hypertension)     Hypertriglyceridemia     Hypokalemia     Hypothyroidism     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    Mumps     ANNETTE (obstructive sleep apnea)     Osteoarthritis of glenohumeral joint     Papillary carcinoma of thyroid (H)     s/p thyroidectomy - Ruegemer    Poliomyelitis     poor circulation right leg    Postsurgical hypothyroidism     s/p papillary thryoid cancer - Ruegemer    Pulmonary embolism (H)     Pulmonary nodule     S/P carpal tunnel release     bilateral    S/P hardware removal 01/2014    still with lingering foot pain    S/P shoulder surgery     bilateral    Septic joint (H)     right knee    Venous insufficiency        Past Surgical History   I have reviewed this patient's surgical history and updated it with pertinent information if needed.  Past Surgical History:   Procedure Laterality Date    AMPUTATE TOE(S)  03/15/2012    Procedure:AMPUTATE TOE(S); Surgeon:RUBEN MOSES; Location: OR    AMPUTATE TOE(S)      APPENDECTOMY  1972    APPENDECTOMY      ARTHRODESIS FOOT  03/15/2012    Procedure:ARTHRODESIS FOOT; RIGHT TRIPLE ARTHRODESIS, FIFTH TOE AMPUTATION, LATERAL LIGAMENT RECONSTRUCTION, TENDON TRANSFER AND RELEASE [MINI C-ARM, ARTHREX 4.5 AND 6.7 STAPLES, BIOCOMPOSITE TENODESIS SCREWS]; Surgeon:RUBEN MOSES; Location:[facility] OR    ARTHRODESIS FOOT Right     Right foot triple arthrodesis and removal of hardware    ARTHROSCOPY SHOULDER  06/25/2015    REVISION SUBACROMIAL DECOMPRESSION, EXCISION OF GANGLION CYST, DEBRIDEMENT AND EXCISION OF THE GLENOHUMERAL JOINT GANGLION CYST, CORACOID DECOMPRESSION, POSSIBLE SUBSCAPULARIS REPAIR AND OPEN SUBSCAPULARIS BICEP    ARTHROSCOPY SHOULDER ROTATOR CUFF REPAIR      BIOPSY  Thyroid 2002    BLADDER SURGERY       BOTOX INJECTION MEDICAL      BREAST SURGERY  Biopsy    CHOLECYSTECTOMY      CHOLECYSTECTOMY      COLONOSCOPY  2018    COLOSTOMY  02/07/2012    Procedure:COLOSTOMY; CREATION OF SIGMOID COLOSTOMY AND EXTENSIVE  LYSIS OF ADHESIONS; Surgeon:MONTSERRAT BENDER; Location: OR    COLOSTOMY      CV ANGIOGRAM RENAL BILATERAL Bilateral 11/17/2023    Procedure: Angiogram Renal Bilateral;  Surgeon: Ankit Bhatia MD;  Location:  [facility]    CV CORONARY ANGIOGRAM N/A 11/17/2023    Procedure: Coronary Angiogram;  Surgeon: Ankit Bhatia MD;  Location:  [facility]    CYSTOSCOPY      CYSTOSCOPY, INJECT BOTOX N/A 09/18/2023    Procedure: CYSTOSCOPY, WITH BOTULINUM TOXIN INJECTION;  Surgeon: Mishel Zendejas MD;  [place] [facility] [place]    CYSTOSCOPY, INJECT BOTOX N/A 2/20/2024    Procedure: CYSTOSCOPY, WITH BOTULINUM TOXIN INJECTION;  Surgeon: Mishel Zendejas MD;  [place] [facility] [place]    CYSTOSCOPY, INJECT BOTOX N/A 11/4/2024    Procedure: INTRAVESICAL BOTULINUM TOXIN INJECTION;  Surgeon: Mishel Zendejas MD;  [place] [facility] [place]    CYSTOSCOPY, TRANSURETHRAL RESECTION (TUR) TUMOR BLADDER, COMBINED N/A 11/4/2024    Procedure: EXAM UNDER ANESTHESIA, PELVIS, WITH CYSTOSCOPY;  Surgeon: Mishel Zendejas MD;  [place] [facility] [place]    EYE SURGERY      GENITOURINARY SURGERY  1999    GI SURGERY      weakened rectal sphincter with artificial stimulator    HERNIA REPAIR  1976    HYSTERECTOMY TOTAL ABDOMINAL      LAPAROTOMY, LYSIS ADHESIONS, COMBINED  02/07/2012    Procedure:COMBINED LAPAROTOMY, LYSIS ADHESIONS; Surgeon:MONTSERRAT BENDER; Location: OR    RELEASE CARPAL TUNNEL      RELEASE TENDON FOOT  03/15/2012    Procedure:RELEASE TENDON FOOT; Surgeon:RUBEN MOSES; Location: OR    REMOVE HARDWARE FOOT  12/13/2012    Procedure: REMOVE HARDWARE FOOT;  RIGHT FOOT REMOVAL OF HARDWARE;  Surgeon: Ruben Moses MD;  Location:  OR    SHOULDER SURGERY   2020    LEFT SHOULDER HEMIARHTROPLASTY, BICEP TENODESIS    SOFT TISSUE SURGERY  2020    TENDON RELEASE      foot    THYROIDECTOMY      ZZC FREEING BOWEL ADHESION,ENTEROLYSIS      , ,     ZZC NONSPECIFIC PROCEDURE      throidectomy    ZC TOTAL ABDOM HYSTERECTOMY      + BSO       Prior to Admission Medications   Prior to Admission Medications   Prescriptions Last Dose Informant Patient Reported? Taking?   BIOTIN PO   Yes No   Sig: Take 1 capsule by mouth at bedtime Unknown dose   Continuous Glucose Sensor (FREESTYLE BRANDY 3 PLUS SENSOR) MISC   No No   Si each once for 1 dose. Every 15 days   Lidocaine (LIDOCARE) 4 % Patch   Yes No   Sig: Place 1 patch onto the skin as needed To prevent lidocaine toxicity, patient should be patch free for 12 hrs daily.   Multiple vitamin TABS   Yes No   Sig: Take 1 tablet by mouth daily   Polyethylene Glycol 400 (BLINK TEARS) 0.25 % GEL   Yes No   Sig: Place 1 drop into both eyes at bedtime   SYNTHROID 112 MCG tablet   No No   Sig: Take 1 tablet (112 mcg) by mouth daily.   Vitamin D3 50 mcg (2000 units) tablet   Yes No   Sig: Take 1 tablet by mouth every evening Takes in the afternoon   acetaminophen (TYLENOL) 500 MG tablet   Yes No   Sig: Take 1,000 mg by mouth every 8 hours as needed   amLODIPine (NORVASC) 2.5 MG tablet   No No   Sig: Take 1 tablet (2.5 mg) by mouth daily as needed For blood pressure > 140   amoxicillin (AMOXIL) 500 MG capsule   Yes No   Sig: Takes 4 caps before every dental appointments.   aspirin (ASA) 81 MG EC tablet   Yes No   Sig: Take 81 mg by mouth daily (with lunch)   blood glucose (NO BRAND SPECIFIED) test strip   No No   Sig: Use to test blood sugar 3 times daily or as directed.   calcium citrate 250 MG TABS   Yes No   Sig: Take 1 tablet by mouth at bedtime   carvedilol (COREG) 12.5 MG tablet   No No   Sig: Take 1 tablet (12.5 mg) by mouth 2 times daily (with meals)   clotrimazole (LOTRIMIN) 1 % cream   Yes No   Sig: Apply  topically as needed (rash/yeast infection)   cyanocobalamin (VITAMIN B-12) 1000 MCG tablet   Yes No   Sig: Take 1,000 mcg by mouth every evening   empagliflozin (JARDIANCE) 10 MG TABS tablet   No No   Sig: Take 1 tablet (10 mg) by mouth every evening.   ezetimibe (ZETIA) 10 MG tablet   No No   Sig: Take 1 tablet (10 mg) by mouth every evening.   famotidine (PEPCID) 20 MG tablet   No No   Sig: Take 1 tablet (20 mg) by mouth every evening.   fenofibrate (TRICOR) 145 MG tablet   No No   Sig: Take 1 tablet (145 mg) by mouth at bedtime.   fexofenadine (ALLEGRA) 180 MG tablet   Yes No   Sig: Take 180 mg by mouth daily (with lunch) Takes in the afternoon   furosemide (LASIX) 20 MG tablet   Yes No   Sig: Take 1 tablet (20 mg) by mouth daily as needed (lower extremity edema).   icosapent ethyl (VASCEPA) 1 g CAPS capsule   No No   Sig: Take 2 g by mouth every evening.   insulin aspart (NOVOLOG PEN) 100 UNIT/ML pen   No No   Sig: Inject 10 Units subcutaneously daily (with breakfast) AND 12 Units daily (before supper).   insulin glargine 100 UNIT/ML pen   No No   Sig: Inject 10 Units subcutaneously every morning.   ipratropium (ATROVENT) 0.03 % nasal spray   Yes No   Sig: Spray 1 spray in nostril every 8 hours as needed   ketoconazole (NIZORAL) 2 % external cream   Yes No   Sig: Apply topically 2 times daily as needed   methocarbamol (ROBAXIN) 500 MG tablet   No No   Sig: Take 1-2 tablets (500-1,000 mg) by mouth 4 times daily as needed for muscle spasms.   minoxidil (LONITEN) 2.5 MG tablet   Yes No   Sig: Take 0.25 tablets by mouth twice a week On Monday and Thursday   nitroGLYcerin (NITROSTAT) 0.4 MG sublingual tablet   No No   Sig: Place 1 tablet (0.4 mg) under the tongue every 5 minutes as needed for chest pain (no more than 3 in one hour; after 3rd, call 911.)   olmesartan (BENICAR) 40 MG tablet   No No   Sig: Take 1 tablet (40 mg) by mouth daily.   ondansetron (ZOFRAN) 4 MG tablet   No No   Sig: Take 1 tablet (4 mg) by  mouth every 6 hours as needed Reported on 3/20/2017   oxyCODONE (ROXICODONE) 5 MG tablet   No No   Sig: Take 1 tablet (5 mg) by mouth every 4 hours as needed for severe pain (IF pain not managed with non-pharmacological and non-opioid interventions).   pantoprazole (PROTONIX) 40 MG EC tablet   Yes No   Sig: Take 40 mg by mouth 2 times daily   polyethylene glycol (MIRALAX) 17 g packet   No No   Sig: Take 17 g by mouth 2 times daily as needed for constipation.   senna-docusate (SENOKOT-S/PERICOLACE) 8.6-50 MG tablet   No No   Sig: Take 1 tablet by mouth daily.   Patient taking differently: Take 1 tablet by mouth daily as needed.   sucralfate (CARAFATE) 1 GM/10ML suspension   Yes No   Sig: Take 1 g by mouth 4 times daily as needed (GERD)   tretinoin (RETIN-A) 0.025 % external cream   Yes No   Sig: Apply topically nightly as needed (facial lesions) Use every night as tolerated - spot treat lesion   triamcinolone (KENALOG) 0.025 % external ointment   Yes No   Sig: Apply topically 2 times daily as needed for irritation      Facility-Administered Medications: None     Allergies   Allergies   Allergen Reactions    Ketorolac Tromethamine Difficulty breathing     Shortness of breath to tablets only per the patient    Nsaids Difficulty breathing     Increased creatinine    Celecoxib Itching and Rash    Morphine And Codeine Itching     With higher doses. Pt denies this allergy 11/16/2023    Codeine Itching    Conjugated Estrogens     Crestor [Rosuvastatin] Muscle Pain (Myalgia)    No Clinical Screening - See Comments Itching     Fragrance    Vioxx Other (See Comments)     Heart races    Sulfa Antibiotics Rash       Social History   I have reviewed this patient's social history and updated it with pertinent information if needed. Chasity SU Rema  reports that she has never smoked. She has never been exposed to tobacco smoke. She has never used smokeless tobacco. She reports that she does not drink alcohol and does not use  drugs.    Family History   I have reviewed this patient's family history and updated it with pertinent information if needed.   Family History   Problem Relation Age of Onset    Arthritis Mother     Hypertension Mother     Cerebrovascular Disease Mother     Obesity Mother     Heart Disease Mother         MI's    Hypertension Father     Respiratory Father         Adult RDS    Diabetes Father     Skin Cancer Sister     Arthritis Sister     Cancer Sister     Diabetes Sister     Hypertension Sister     Breast Cancer Sister     Depression Sister     Thyroid Disease Sister     Obesity Sister     Arthritis Sister     Hypertension Sister     Thyroid Disease Sister     Osteoporosis Sister     Obesity Sister     Cancer Sister         colon polup    Hypertension Sister     Osteoporosis Sister     Obesity Sister     Colon Cancer Sister     Lipids Sister     Obesity Sister     Heart Disease Brother         MI at 54    Other Cancer Brother         Lung & bone    Lipids Brother     Hypertension Brother     Diabetes Brother     Hyperlipidemia Brother     Obesity Maternal Grandmother         Dad s mother    Skin Cancer Maternal Grandmother         skin cancer unknown    Cancer Maternal Grandmother         unknown skin cancer on face    Obesity Paternal Grandmother         Mothers mother    Ovarian Cancer No family hx of     Anesthesia Reaction No family hx of     Deep Vein Thrombosis (DVT) No family hx of        Review of Systems   The 10 point Review of Systems is negative other than noted in the HPI or here.     Physical Exam   Temp: 97.9  F (36.6  C) Temp src: Oral BP: (!) 146/57 Pulse: 74   Resp: 20 SpO2: 95 %      Vital Signs with Ranges  Temp:  [97.9  F (36.6  C)] 97.9  F (36.6  C)  Pulse:  [74] 74  Resp:  [20-22] 20  BP: (142-146)/() 146/57  SpO2:  [95 %-100 %] 95 %  0 lbs 0 oz    Constitutional: Elderly female, no acute distress.  Pleasant.  Calm and cooperative.  Left arm in sling.  HEENT: Normocephalic, MMM, no  elevation of JVD noted  Respiratory: Nl WOB, Clear bilaterally, No wheezes, no crackles  Cardiovascular: Regular, no murmur  GI: BS+, NT, ND, no rebound or guarding  Lymph/Hematologic: No bruising. No cervical LAD  Skin: No rash  Musculoskeletal: Patient with left arm in sling.  Keeps left arm still secondary to pain.  She does have some tenderness to palpation at her left wrist joint.  Distal extremities warm and well-perfused.  Sensation intact in distal extremity.  Neurologic: A&Ox3, Answers appropriately. CNII-XII intact.  No tremor.  Keeps left arm still secondary to pain.  Psychiatric: Calm    Data   Data reviewed today:  I personally reviewed   Recent Labs   Lab 01/25/25  0304   WBC 10.1   HGB 11.3*   MCV 94   PLT 84*      POTASSIUM 4.9   CHLORIDE 103   CO2 22   BUN 28.6*   CR 1.36*   ANIONGAP 12   RINKU 9.1   *       Recent Results (from the past 24 hours)   Humerus XR,  G/E 2 views, left    Narrative    EXAM: XR HUMERUS LEFT G/E 2 VIEWS  LOCATION: [facility]  DATE: 1/25/2025    INDICATION: Status post fall. Left shoulder replacement.  COMPARISON: 8/17/2024      Impression    IMPRESSION: An angulated periprosthetic fracture is seen in the proximal shaft of the left humerus with medial apex angulation of fracture fragments.        Clinically Significant Risk Factors Present on Admission                 # Drug Induced Platelet Defect: home medication list includes an antiplatelet medication   # Hypertension: Noted on problem list          # DMII: A1C = N/A within past 6 months       # Financial/Environmental Concerns:

## 2025-01-25 NOTE — PLAN OF CARE
ROOM # 230    Living Situation (if not independent, order SW consult): home malissa [place] in [place]  Facility name:  : Daughter (Yadira): [phone]    Activity level at baseline: Ind with walker  Activity level on admit: Ax2 hovermatt, not OOB    Who will be transporting you at discharge: TBD    Patient registered to observation; given Patient Bill of Rights; given the opportunity to ask questions about observation status and their plan of care.  Patient has been oriented to the observation room, bathroom and call light is in place.    Discussed discharge goals and expectations with patient/family.

## 2025-01-25 NOTE — PROGRESS NOTES
Patient presents with fall with humerus fracture and was supposed to go to the OR today but did not have availability likely go again to the OR tomorrow

## 2025-01-25 NOTE — ED TRIAGE NOTES
Patient comes to ED via EMS for evaluation of Left arm pain after mechanical fall at home.  Patient reports getting up from her chair and getting feet tangled in walker. Pain in arm, mid humerus, CMS intact.  Alert and oriented x 4, pain 10/10 on arrival.     Triage Assessment (Adult)       Row Name 01/25/25 0229          Triage Assessment    Airway WDL WDL        Respiratory WDL    Respiratory WDL WDL        Skin Circulation/Temperature WDL    Skin Circulation/Temperature WDL WDL        Cardiac WDL    Cardiac WDL WDL        Peripheral/Neurovascular WDL    Peripheral Neurovascular WDL WDL        Cognitive/Neuro/Behavioral WDL    Cognitive/Neuro/Behavioral WDL WDL

## 2025-01-25 NOTE — ED NOTES
Attempted to notify facility of patient admission.  Voice mail currently full, could not leave a message.

## 2025-01-25 NOTE — PHARMACY-ADMISSION MEDICATION HISTORY
Pharmacist Admission Medication History    Admission medication history is complete. The information provided in this note is only as accurate as the sources available at the time of the update.    Information Source(s): Patient and CareEverywhere/SureScripts via in-person    Changes made to PTA medication list:  Added: None  Deleted: Miralax, amoxicillin  Changed: acetaminophen, amlodipine, famotidine    Allergies reviewed with patient and updates made in EHR: yes    Medication History Completed By: Angella Bertrand PharmD 1/25/2025 10:38 AM    PTA Med List   Medication Sig Last Dose/Taking    acetaminophen (TYLENOL) 500 MG tablet Take 1,000 mg by mouth 2 times daily. 1/25/2025 Morning    amLODIPine (NORVASC) 2.5 MG tablet Take by mouth daily as needed (systolic blood pressure > 140 mmHg). Take 0.5 tablets (1.25 mg) as needed prior to MD appointments for systolic blood pressure > 140 mmHg. Take an additional 0.5 tablets (1.25 mg) if systolic blood pressure is still elevated. Taking As Needed    BIOTIN PO Take 1 capsule by mouth at bedtime Unknown dose 1/24/2025    calcium citrate 250 MG TABS Take 1 tablet by mouth at bedtime 1/24/2025    carvedilol (COREG) 12.5 MG tablet Take 1 tablet (12.5 mg) by mouth 2 times daily (with meals) 1/24/2025 Evening    clotrimazole (LOTRIMIN) 1 % cream Apply topically as needed (rash/yeast infection) Past Week    cyanocobalamin (VITAMIN B-12) 1000 MCG tablet Take 1,000 mcg by mouth every evening 1/24/2025    empagliflozin (JARDIANCE) 10 MG TABS tablet Take 1 tablet (10 mg) by mouth every evening. 1/24/2025    ezetimibe (ZETIA) 10 MG tablet Take 1 tablet (10 mg) by mouth every evening. 1/24/2025    famotidine (PEPCID) 40 MG tablet Take 40 mg by mouth daily. 1/24/2025    fexofenadine (ALLEGRA) 180 MG tablet Take 180 mg by mouth daily (with lunch) Takes in the afternoon 1/24/2025    furosemide (LASIX) 20 MG tablet Take 1 tablet (20 mg) by mouth daily as needed (lower extremity edema).  Past Month    icosapent ethyl (VASCEPA) 1 g CAPS capsule Take 2 g by mouth every evening. 1/24/2025    insulin aspart (NOVOLOG PEN) 100 UNIT/ML pen Inject 10 Units subcutaneously daily (with breakfast) AND 12 Units daily (before supper). 1/24/2025    insulin glargine 100 UNIT/ML pen Inject 10 Units subcutaneously every morning. 1/24/2025    ipratropium (ATROVENT) 0.03 % nasal spray Spray 1 spray in nostril every 8 hours as needed Past Week    ketoconazole (NIZORAL) 2 % external cream Apply topically 2 times daily as needed Past Week    Lidocaine (LIDOCARE) 4 % Patch Place 1 patch onto the skin as needed To prevent lidocaine toxicity, patient should be patch free for 12 hrs daily. More than a month    methocarbamol (ROBAXIN) 500 MG tablet Take 1-2 tablets (500-1,000 mg) by mouth 4 times daily as needed for muscle spasms. Past Month    minoxidil (LONITEN) 2.5 MG tablet Take 0.25 tablets by mouth twice a week On Monday and Thursday 1/23/2025    Multiple vitamin TABS Take 1 tablet by mouth daily 1/24/2025    olmesartan (BENICAR) 40 MG tablet Take 1 tablet (40 mg) by mouth daily. 1/24/2025    ondansetron (ZOFRAN) 4 MG tablet Take 1 tablet (4 mg) by mouth every 6 hours as needed Reported on 3/20/2017 Past Month    oxyCODONE (ROXICODONE) 5 MG tablet Take 1 tablet (5 mg) by mouth every 4 hours as needed for severe pain (IF pain not managed with non-pharmacological and non-opioid interventions). Past Month    pantoprazole (PROTONIX) 40 MG EC tablet Take 40 mg by mouth 2 times daily 1/24/2025    Polyethylene Glycol 400 (BLINK TEARS) 0.25 % GEL Place 1 drop into both eyes at bedtime 1/24/2025    senna-docusate (SENOKOT-S/PERICOLACE) 8.6-50 MG tablet Take 1 tablet by mouth daily. (Patient taking differently: Take 1 tablet by mouth daily as needed.) Past Month    sucralfate (CARAFATE) 1 GM/10ML suspension Take 1 g by mouth 4 times daily as needed (GERD) More than a month    SYNTHROID 112 MCG tablet Take 1 tablet (112 mcg) by  mouth daily. 1/24/2025    tretinoin (RETIN-A) 0.025 % external cream Apply topically nightly as needed (facial lesions) Use every night as tolerated - spot treat lesion More than a month    triamcinolone (KENALOG) 0.025 % external ointment Apply topically 2 times daily as needed for irritation Past Week    Vitamin D3 50 mcg (2000 units) tablet Take 1 tablet by mouth every evening Takes in the afternoon 1/24/2025

## 2025-01-26 ENCOUNTER — APPOINTMENT (OUTPATIENT)
Dept: GENERAL RADIOLOGY | Facility: CLINIC | Age: 80
DRG: 493 | End: 2025-01-26
Attending: STUDENT IN AN ORGANIZED HEALTH CARE EDUCATION/TRAINING PROGRAM
Payer: MEDICARE

## 2025-01-26 ENCOUNTER — ANESTHESIA (OUTPATIENT)
Dept: SURGERY | Facility: CLINIC | Age: 80
End: 2025-01-26
Payer: MEDICARE

## 2025-01-26 ENCOUNTER — ANESTHESIA EVENT (OUTPATIENT)
Dept: SURGERY | Facility: CLINIC | Age: 80
End: 2025-01-26
Payer: MEDICARE

## 2025-01-26 LAB
GLUCOSE BLDC GLUCOMTR-MCNC: 125 MG/DL (ref 70–99)
GLUCOSE BLDC GLUCOMTR-MCNC: 188 MG/DL (ref 70–99)
GLUCOSE BLDC GLUCOMTR-MCNC: 203 MG/DL (ref 70–99)
GLUCOSE BLDC GLUCOMTR-MCNC: 208 MG/DL (ref 70–99)
GLUCOSE BLDC GLUCOMTR-MCNC: 228 MG/DL (ref 70–99)
GLUCOSE BLDC GLUCOMTR-MCNC: 243 MG/DL (ref 70–99)

## 2025-01-26 PROCEDURE — 250N000013 HC RX MED GY IP 250 OP 250 PS 637: Performed by: STUDENT IN AN ORGANIZED HEALTH CARE EDUCATION/TRAINING PROGRAM

## 2025-01-26 PROCEDURE — 250N000013 HC RX MED GY IP 250 OP 250 PS 637: Performed by: HOSPITALIST

## 2025-01-26 PROCEDURE — 250N000009 HC RX 250: Performed by: STUDENT IN AN ORGANIZED HEALTH CARE EDUCATION/TRAINING PROGRAM

## 2025-01-26 PROCEDURE — 272N000002 HC OR SUPPLY OTHER OPNP: Performed by: STUDENT IN AN ORGANIZED HEALTH CARE EDUCATION/TRAINING PROGRAM

## 2025-01-26 PROCEDURE — 258N000001 HC RX 258: Performed by: STUDENT IN AN ORGANIZED HEALTH CARE EDUCATION/TRAINING PROGRAM

## 2025-01-26 PROCEDURE — 250N000011 HC RX IP 250 OP 636: Performed by: STUDENT IN AN ORGANIZED HEALTH CARE EDUCATION/TRAINING PROGRAM

## 2025-01-26 PROCEDURE — 0PSD04Z REPOSITION LEFT HUMERAL HEAD WITH INTERNAL FIXATION DEVICE, OPEN APPROACH: ICD-10-PCS | Performed by: STUDENT IN AN ORGANIZED HEALTH CARE EDUCATION/TRAINING PROGRAM

## 2025-01-26 PROCEDURE — 250N000011 HC RX IP 250 OP 636: Performed by: HOSPITALIST

## 2025-01-26 PROCEDURE — 999N000065 XR HUMERUS PORT LEFT G/E 2 VIEWS: Mod: LT

## 2025-01-26 PROCEDURE — 250N000011 HC RX IP 250 OP 636: Performed by: ANESTHESIOLOGY

## 2025-01-26 PROCEDURE — 258N000003 HC RX IP 258 OP 636: Performed by: STUDENT IN AN ORGANIZED HEALTH CARE EDUCATION/TRAINING PROGRAM

## 2025-01-26 PROCEDURE — 96376 TX/PRO/DX INJ SAME DRUG ADON: CPT

## 2025-01-26 PROCEDURE — G0378 HOSPITAL OBSERVATION PER HR: HCPCS

## 2025-01-26 PROCEDURE — C1713 ANCHOR/SCREW BN/BN,TIS/BN: HCPCS | Performed by: STUDENT IN AN ORGANIZED HEALTH CARE EDUCATION/TRAINING PROGRAM

## 2025-01-26 PROCEDURE — 258N000003 HC RX IP 258 OP 636: Performed by: ANESTHESIOLOGY

## 2025-01-26 PROCEDURE — 710N000009 HC RECOVERY PHASE 1, LEVEL 1, PER MIN: Performed by: STUDENT IN AN ORGANIZED HEALTH CARE EDUCATION/TRAINING PROGRAM

## 2025-01-26 PROCEDURE — 250N000025 HC SEVOFLURANE, PER MIN: Performed by: STUDENT IN AN ORGANIZED HEALTH CARE EDUCATION/TRAINING PROGRAM

## 2025-01-26 PROCEDURE — 360N000083 HC SURGERY LEVEL 3 W/ FLUORO, PER MIN: Performed by: STUDENT IN AN ORGANIZED HEALTH CARE EDUCATION/TRAINING PROGRAM

## 2025-01-26 PROCEDURE — 82962 GLUCOSE BLOOD TEST: CPT

## 2025-01-26 PROCEDURE — 999N000179 XR SURGERY CARM FLUORO LESS THAN 5 MIN W STILLS

## 2025-01-26 PROCEDURE — 250N000011 HC RX IP 250 OP 636: Performed by: NURSE ANESTHETIST, CERTIFIED REGISTERED

## 2025-01-26 PROCEDURE — 272N000001 HC OR GENERAL SUPPLY STERILE: Performed by: STUDENT IN AN ORGANIZED HEALTH CARE EDUCATION/TRAINING PROGRAM

## 2025-01-26 PROCEDURE — 258N000003 HC RX IP 258 OP 636: Performed by: NURSE ANESTHETIST, CERTIFIED REGISTERED

## 2025-01-26 PROCEDURE — 250N000009 HC RX 250: Performed by: NURSE ANESTHETIST, CERTIFIED REGISTERED

## 2025-01-26 PROCEDURE — 73130 X-RAY EXAM OF HAND: CPT | Mod: LT

## 2025-01-26 PROCEDURE — 99232 SBSQ HOSP IP/OBS MODERATE 35: CPT | Performed by: HOSPITALIST

## 2025-01-26 PROCEDURE — 999N000141 HC STATISTIC PRE-PROCEDURE NURSING ASSESSMENT: Performed by: STUDENT IN AN ORGANIZED HEALTH CARE EDUCATION/TRAINING PROGRAM

## 2025-01-26 PROCEDURE — 370N000017 HC ANESTHESIA TECHNICAL FEE, PER MIN: Performed by: STUDENT IN AN ORGANIZED HEALTH CARE EDUCATION/TRAINING PROGRAM

## 2025-01-26 PROCEDURE — 73090 X-RAY EXAM OF FOREARM: CPT | Mod: LT

## 2025-01-26 DEVICE — IMP SCR SYN 3.5X20MM LOCKING W/STARDRIVE SS 212.106: Type: IMPLANTABLE DEVICE | Site: ARM | Status: FUNCTIONAL

## 2025-01-26 DEVICE — IMP CABLE W/CRIMP SYN 1.7X750MM 298.801.01S: Type: IMPLANTABLE DEVICE | Site: ARM | Status: FUNCTIONAL

## 2025-01-26 DEVICE — IMP SCR SYN CORTEX 3.5X24MM SELF TAP SS 204.824: Type: IMPLANTABLE DEVICE | Site: ARM | Status: FUNCTIONAL

## 2025-01-26 DEVICE — IMP SCR SYN 3.5X22MM LOCKING W/STARDRIVE SS 212.107: Type: IMPLANTABLE DEVICE | Site: ARM | Status: FUNCTIONAL

## 2025-01-26 DEVICE — IMP SCR SYN 3.5X16MM LOCKING W/STARDRIVE SS 212.104: Type: IMPLANTABLE DEVICE | Site: ARM | Status: FUNCTIONAL

## 2025-01-26 DEVICE — IMPLANTABLE DEVICE: Type: IMPLANTABLE DEVICE | Site: ARM | Status: FUNCTIONAL

## 2025-01-26 DEVICE — IMP SCR SYN 3.5X30MM LOCKING W/STARDRIVE SS 212.111: Type: IMPLANTABLE DEVICE | Site: ARM | Status: FUNCTIONAL

## 2025-01-26 DEVICE — IMP SCR SYN CORTEX 3.5X26MM SELF TAP SS 204.826: Type: IMPLANTABLE DEVICE | Site: ARM | Status: FUNCTIONAL

## 2025-01-26 DEVICE — IMP SCR SYN 3.5X26MM LOCKING W/STARDRIVE SS 212.109: Type: IMPLANTABLE DEVICE | Site: ARM | Status: FUNCTIONAL

## 2025-01-26 RX ORDER — METHOCARBAMOL 500 MG/1
500-1000 TABLET, FILM COATED ORAL 4 TIMES DAILY PRN
Status: DISPENSED | OUTPATIENT
Start: 2025-01-26

## 2025-01-26 RX ORDER — DEXAMETHASONE SODIUM PHOSPHATE 4 MG/ML
4 INJECTION, SOLUTION INTRA-ARTICULAR; INTRALESIONAL; INTRAMUSCULAR; INTRAVENOUS; SOFT TISSUE
Status: DISCONTINUED | OUTPATIENT
Start: 2025-01-26 | End: 2025-01-26 | Stop reason: HOSPADM

## 2025-01-26 RX ORDER — FENTANYL CITRATE 50 UG/ML
INJECTION, SOLUTION INTRAMUSCULAR; INTRAVENOUS PRN
Status: DISCONTINUED | OUTPATIENT
Start: 2025-01-26 | End: 2025-01-26

## 2025-01-26 RX ORDER — BISACODYL 10 MG
10 SUPPOSITORY, RECTAL RECTAL DAILY PRN
Status: ACTIVE | OUTPATIENT
Start: 2025-01-29

## 2025-01-26 RX ORDER — HYDROMORPHONE HCL IN WATER/PF 6 MG/30 ML
0.2 PATIENT CONTROLLED ANALGESIA SYRINGE INTRAVENOUS EVERY 4 HOURS PRN
Status: ACTIVE | OUTPATIENT
Start: 2025-01-26

## 2025-01-26 RX ORDER — NALOXONE HYDROCHLORIDE 0.4 MG/ML
0.1 INJECTION, SOLUTION INTRAMUSCULAR; INTRAVENOUS; SUBCUTANEOUS
Status: DISCONTINUED | OUTPATIENT
Start: 2025-01-26 | End: 2025-01-26 | Stop reason: HOSPADM

## 2025-01-26 RX ORDER — OXYCODONE HYDROCHLORIDE 5 MG/1
5 TABLET ORAL
Status: DISCONTINUED | OUTPATIENT
Start: 2025-01-26 | End: 2025-01-26 | Stop reason: HOSPADM

## 2025-01-26 RX ORDER — ONDANSETRON 4 MG/1
4 TABLET, ORALLY DISINTEGRATING ORAL EVERY 30 MIN PRN
Status: DISCONTINUED | OUTPATIENT
Start: 2025-01-26 | End: 2025-01-26 | Stop reason: HOSPADM

## 2025-01-26 RX ORDER — SODIUM CHLORIDE, SODIUM LACTATE, POTASSIUM CHLORIDE, CALCIUM CHLORIDE 600; 310; 30; 20 MG/100ML; MG/100ML; MG/100ML; MG/100ML
INJECTION, SOLUTION INTRAVENOUS CONTINUOUS
Status: DISCONTINUED | OUTPATIENT
Start: 2025-01-26 | End: 2025-01-28

## 2025-01-26 RX ORDER — TRANEXAMIC ACID 10 MG/ML
1 INJECTION, SOLUTION INTRAVENOUS ONCE
Status: COMPLETED | OUTPATIENT
Start: 2025-01-26 | End: 2025-01-26

## 2025-01-26 RX ORDER — TRANEXAMIC ACID 10 MG/ML
1 INJECTION, SOLUTION INTRAVENOUS ONCE
Status: DISCONTINUED | OUTPATIENT
Start: 2025-01-26 | End: 2025-01-30

## 2025-01-26 RX ORDER — ONDANSETRON 2 MG/ML
4 INJECTION INTRAMUSCULAR; INTRAVENOUS EVERY 30 MIN PRN
Status: DISCONTINUED | OUTPATIENT
Start: 2025-01-26 | End: 2025-01-26 | Stop reason: HOSPADM

## 2025-01-26 RX ORDER — FENTANYL CITRATE 50 UG/ML
50 INJECTION, SOLUTION INTRAMUSCULAR; INTRAVENOUS EVERY 5 MIN PRN
Status: DISCONTINUED | OUTPATIENT
Start: 2025-01-26 | End: 2025-01-27

## 2025-01-26 RX ORDER — DEXAMETHASONE SODIUM PHOSPHATE 4 MG/ML
INJECTION, SOLUTION INTRA-ARTICULAR; INTRALESIONAL; INTRAMUSCULAR; INTRAVENOUS; SOFT TISSUE PRN
Status: DISCONTINUED | OUTPATIENT
Start: 2025-01-26 | End: 2025-01-26

## 2025-01-26 RX ORDER — ACETAMINOPHEN 325 MG/1
975 TABLET ORAL
Status: DISCONTINUED | OUTPATIENT
Start: 2025-01-26 | End: 2025-01-26 | Stop reason: HOSPADM

## 2025-01-26 RX ORDER — CEFAZOLIN SODIUM 2 G/100ML
2 INJECTION, SOLUTION INTRAVENOUS EVERY 8 HOURS
Status: COMPLETED | OUTPATIENT
Start: 2025-01-26 | End: 2025-01-27

## 2025-01-26 RX ORDER — SODIUM CHLORIDE, SODIUM LACTATE, POTASSIUM CHLORIDE, CALCIUM CHLORIDE 600; 310; 30; 20 MG/100ML; MG/100ML; MG/100ML; MG/100ML
INJECTION, SOLUTION INTRAVENOUS CONTINUOUS
Status: DISCONTINUED | OUTPATIENT
Start: 2025-01-26 | End: 2025-01-26 | Stop reason: HOSPADM

## 2025-01-26 RX ORDER — CEFAZOLIN SODIUM/WATER 2 G/20 ML
2 SYRINGE (ML) INTRAVENOUS
Status: COMPLETED | OUTPATIENT
Start: 2025-01-26 | End: 2025-01-26

## 2025-01-26 RX ORDER — ENOXAPARIN SODIUM 100 MG/ML
40 INJECTION SUBCUTANEOUS EVERY 24 HOURS
Status: DISCONTINUED | OUTPATIENT
Start: 2025-01-27 | End: 2025-01-27

## 2025-01-26 RX ORDER — PROPOFOL 10 MG/ML
INJECTION, EMULSION INTRAVENOUS PRN
Status: DISCONTINUED | OUTPATIENT
Start: 2025-01-26 | End: 2025-01-26

## 2025-01-26 RX ORDER — FENTANYL CITRATE 50 UG/ML
50 INJECTION, SOLUTION INTRAMUSCULAR; INTRAVENOUS EVERY 5 MIN PRN
Status: DISCONTINUED | OUTPATIENT
Start: 2025-01-26 | End: 2025-01-26 | Stop reason: HOSPADM

## 2025-01-26 RX ORDER — CEFAZOLIN SODIUM/WATER 2 G/20 ML
2 SYRINGE (ML) INTRAVENOUS EVERY 8 HOURS
Status: DISCONTINUED | OUTPATIENT
Start: 2025-01-26 | End: 2025-01-26

## 2025-01-26 RX ORDER — SUCRALFATE ORAL 1 G/10ML
1 SUSPENSION ORAL 4 TIMES DAILY PRN
Status: ACTIVE | OUTPATIENT
Start: 2025-01-26

## 2025-01-26 RX ORDER — PROCHLORPERAZINE MALEATE 5 MG/1
5 TABLET ORAL EVERY 6 HOURS PRN
Status: DISPENSED | OUTPATIENT
Start: 2025-01-26

## 2025-01-26 RX ORDER — LIDOCAINE HYDROCHLORIDE 20 MG/ML
INJECTION, SOLUTION INFILTRATION; PERINEURAL PRN
Status: DISCONTINUED | OUTPATIENT
Start: 2025-01-26 | End: 2025-01-26

## 2025-01-26 RX ORDER — AMOXICILLIN 250 MG
1 CAPSULE ORAL 2 TIMES DAILY
Status: DISPENSED | OUTPATIENT
Start: 2025-01-26

## 2025-01-26 RX ORDER — ACETAMINOPHEN 325 MG/1
975 TABLET ORAL EVERY 8 HOURS
Status: DISPENSED | OUTPATIENT
Start: 2025-01-26

## 2025-01-26 RX ORDER — HYDROMORPHONE HCL IN WATER/PF 6 MG/30 ML
0.4 PATIENT CONTROLLED ANALGESIA SYRINGE INTRAVENOUS EVERY 5 MIN PRN
Status: DISCONTINUED | OUTPATIENT
Start: 2025-01-26 | End: 2025-01-26 | Stop reason: HOSPADM

## 2025-01-26 RX ORDER — ACETAMINOPHEN 160 MG
TABLET,DISINTEGRATING ORAL PRN
Status: DISCONTINUED | OUTPATIENT
Start: 2025-01-26 | End: 2025-01-26 | Stop reason: HOSPADM

## 2025-01-26 RX ORDER — ONDANSETRON 2 MG/ML
4 INJECTION INTRAMUSCULAR; INTRAVENOUS EVERY 6 HOURS PRN
Status: ACTIVE | OUTPATIENT
Start: 2025-01-26

## 2025-01-26 RX ORDER — OXYCODONE HYDROCHLORIDE 5 MG/1
5 TABLET ORAL EVERY 4 HOURS PRN
Status: DISCONTINUED | OUTPATIENT
Start: 2025-01-26 | End: 2025-01-28

## 2025-01-26 RX ORDER — CEFAZOLIN SODIUM/WATER 2 G/20 ML
2 SYRINGE (ML) INTRAVENOUS SEE ADMIN INSTRUCTIONS
Status: DISCONTINUED | OUTPATIENT
Start: 2025-01-26 | End: 2025-01-26

## 2025-01-26 RX ORDER — FAMOTIDINE 20 MG/1
20 TABLET, FILM COATED ORAL DAILY
Status: DISPENSED | OUTPATIENT
Start: 2025-01-26

## 2025-01-26 RX ORDER — POLYETHYLENE GLYCOL 3350 17 G/17G
17 POWDER, FOR SOLUTION ORAL DAILY
Status: DISPENSED | OUTPATIENT
Start: 2025-01-27

## 2025-01-26 RX ORDER — FENTANYL CITRATE 50 UG/ML
25 INJECTION, SOLUTION INTRAMUSCULAR; INTRAVENOUS EVERY 5 MIN PRN
Status: DISCONTINUED | OUTPATIENT
Start: 2025-01-26 | End: 2025-01-26 | Stop reason: HOSPADM

## 2025-01-26 RX ORDER — MAGNESIUM HYDROXIDE 1200 MG/15ML
LIQUID ORAL PRN
Status: DISCONTINUED | OUTPATIENT
Start: 2025-01-26 | End: 2025-01-26 | Stop reason: HOSPADM

## 2025-01-26 RX ORDER — SODIUM CHLORIDE, SODIUM LACTATE, POTASSIUM CHLORIDE, CALCIUM CHLORIDE 600; 310; 30; 20 MG/100ML; MG/100ML; MG/100ML; MG/100ML
INJECTION, SOLUTION INTRAVENOUS CONTINUOUS
Status: DISCONTINUED | OUTPATIENT
Start: 2025-01-26 | End: 2025-01-26

## 2025-01-26 RX ORDER — HYDROMORPHONE HCL IN WATER/PF 6 MG/30 ML
0.1 PATIENT CONTROLLED ANALGESIA SYRINGE INTRAVENOUS EVERY 4 HOURS PRN
Status: ACTIVE | OUTPATIENT
Start: 2025-01-26

## 2025-01-26 RX ORDER — LABETALOL HYDROCHLORIDE 5 MG/ML
10 INJECTION, SOLUTION INTRAVENOUS
Status: COMPLETED | OUTPATIENT
Start: 2025-01-26 | End: 2025-01-26

## 2025-01-26 RX ORDER — HYDROMORPHONE HCL IN WATER/PF 6 MG/30 ML
0.2 PATIENT CONTROLLED ANALGESIA SYRINGE INTRAVENOUS EVERY 5 MIN PRN
Status: DISCONTINUED | OUTPATIENT
Start: 2025-01-26 | End: 2025-01-26 | Stop reason: HOSPADM

## 2025-01-26 RX ORDER — EPHEDRINE SULFATE 50 MG/ML
INJECTION, SOLUTION INTRAMUSCULAR; INTRAVENOUS; SUBCUTANEOUS PRN
Status: DISCONTINUED | OUTPATIENT
Start: 2025-01-26 | End: 2025-01-26

## 2025-01-26 RX ORDER — ONDANSETRON 4 MG/1
4 TABLET, ORALLY DISINTEGRATING ORAL EVERY 6 HOURS PRN
Status: DISPENSED | OUTPATIENT
Start: 2025-01-26

## 2025-01-26 RX ORDER — ASPIRIN 81 MG/1
81 TABLET ORAL DAILY
Status: DISPENSED | OUTPATIENT
Start: 2025-01-27

## 2025-01-26 RX ORDER — FAMOTIDINE 20 MG/1
40 TABLET, FILM COATED ORAL DAILY
Status: DISCONTINUED | OUTPATIENT
Start: 2025-01-26 | End: 2025-01-26

## 2025-01-26 RX ORDER — VANCOMYCIN HYDROCHLORIDE 1 G/20ML
INJECTION, POWDER, LYOPHILIZED, FOR SOLUTION INTRAVENOUS PRN
Status: DISCONTINUED | OUTPATIENT
Start: 2025-01-26 | End: 2025-01-26 | Stop reason: HOSPADM

## 2025-01-26 RX ORDER — AMLODIPINE BESYLATE 2.5 MG/1
2.5 TABLET ORAL DAILY PRN
Status: ACTIVE | OUTPATIENT
Start: 2025-01-26

## 2025-01-26 RX ORDER — LIDOCAINE 40 MG/G
CREAM TOPICAL
Status: DISCONTINUED | OUTPATIENT
Start: 2025-01-26 | End: 2025-01-26

## 2025-01-26 RX ADMIN — SODIUM CHLORIDE, POTASSIUM CHLORIDE, SODIUM LACTATE AND CALCIUM CHLORIDE: 600; 310; 30; 20 INJECTION, SOLUTION INTRAVENOUS at 08:04

## 2025-01-26 RX ADMIN — PHENYLEPHRINE HYDROCHLORIDE 200 MCG: 10 INJECTION INTRAVENOUS at 08:36

## 2025-01-26 RX ADMIN — VANCOMYCIN HYDROCHLORIDE 1500 MG: 10 INJECTION, POWDER, LYOPHILIZED, FOR SOLUTION INTRAVENOUS at 06:57

## 2025-01-26 RX ADMIN — TRANEXAMIC ACID 1 G: 10 INJECTION, SOLUTION INTRAVENOUS at 08:15

## 2025-01-26 RX ADMIN — HYDROMORPHONE HYDROCHLORIDE 1 MG: 1 INJECTION, SOLUTION INTRAMUSCULAR; INTRAVENOUS; SUBCUTANEOUS at 08:42

## 2025-01-26 RX ADMIN — EPHEDRINE SULFATE 10 MG: 5 INJECTION INTRAVENOUS at 09:57

## 2025-01-26 RX ADMIN — SENNOSIDES AND DOCUSATE SODIUM 1 TABLET: 50; 8.6 TABLET ORAL at 20:54

## 2025-01-26 RX ADMIN — OXYCODONE HYDROCHLORIDE 5 MG: 5 TABLET ORAL at 12:38

## 2025-01-26 RX ADMIN — HYDROMORPHONE HYDROCHLORIDE 0.2 MG: 0.2 INJECTION, SOLUTION INTRAMUSCULAR; INTRAVENOUS; SUBCUTANEOUS at 21:02

## 2025-01-26 RX ADMIN — ACETAMINOPHEN 975 MG: 325 TABLET, FILM COATED ORAL at 12:38

## 2025-01-26 RX ADMIN — EMPAGLIFLOZIN 10 MG: 10 TABLET, FILM COATED ORAL at 20:54

## 2025-01-26 RX ADMIN — PHENYLEPHRINE HYDROCHLORIDE 50 MCG: 10 INJECTION INTRAVENOUS at 09:02

## 2025-01-26 RX ADMIN — SODIUM CHLORIDE, POTASSIUM CHLORIDE, SODIUM LACTATE AND CALCIUM CHLORIDE: 600; 310; 30; 20 INJECTION, SOLUTION INTRAVENOUS at 12:39

## 2025-01-26 RX ADMIN — EPHEDRINE SULFATE 5 MG: 5 INJECTION INTRAVENOUS at 10:00

## 2025-01-26 RX ADMIN — DEXAMETHASONE SODIUM PHOSPHATE 4 MG: 4 INJECTION, SOLUTION INTRA-ARTICULAR; INTRALESIONAL; INTRAMUSCULAR; INTRAVENOUS; SOFT TISSUE at 08:06

## 2025-01-26 RX ADMIN — Medication 2 G: at 08:04

## 2025-01-26 RX ADMIN — METHOCARBAMOL 500 MG: 500 TABLET ORAL at 18:56

## 2025-01-26 RX ADMIN — CARVEDILOL 12.5 MG: 6.25 TABLET, FILM COATED ORAL at 17:26

## 2025-01-26 RX ADMIN — INSULIN ASPART 2 UNITS: 100 INJECTION, SOLUTION INTRAVENOUS; SUBCUTANEOUS at 12:50

## 2025-01-26 RX ADMIN — CEFAZOLIN SODIUM 2 G: 2 INJECTION, SOLUTION INTRAVENOUS at 15:50

## 2025-01-26 RX ADMIN — FENOFIBRATE 134 MG: 160 TABLET, FILM COATED ORAL at 22:32

## 2025-01-26 RX ADMIN — EPHEDRINE SULFATE 10 MG: 5 INJECTION INTRAVENOUS at 09:09

## 2025-01-26 RX ADMIN — FAMOTIDINE 20 MG: 20 TABLET, FILM COATED ORAL at 13:56

## 2025-01-26 RX ADMIN — LIDOCAINE HYDROCHLORIDE 50 MG: 20 INJECTION, SOLUTION INFILTRATION; PERINEURAL at 08:06

## 2025-01-26 RX ADMIN — EZETIMIBE 10 MG: 10 TABLET ORAL at 20:54

## 2025-01-26 RX ADMIN — OXYCODONE HYDROCHLORIDE 5 MG: 5 TABLET ORAL at 18:56

## 2025-01-26 RX ADMIN — PROPOFOL 150 MG: 10 INJECTION, EMULSION INTRAVENOUS at 08:06

## 2025-01-26 RX ADMIN — HYDROMORPHONE HYDROCHLORIDE 0.2 MG: 0.2 INJECTION, SOLUTION INTRAMUSCULAR; INTRAVENOUS; SUBCUTANEOUS at 00:57

## 2025-01-26 RX ADMIN — PHENYLEPHRINE HYDROCHLORIDE 100 MCG: 10 INJECTION INTRAVENOUS at 08:32

## 2025-01-26 RX ADMIN — FENTANYL CITRATE 100 MCG: 50 INJECTION INTRAMUSCULAR; INTRAVENOUS at 08:06

## 2025-01-26 RX ADMIN — ROCURONIUM BROMIDE 50 MG: 50 INJECTION, SOLUTION INTRAVENOUS at 08:06

## 2025-01-26 RX ADMIN — OXYCODONE HYDROCHLORIDE 5 MG: 5 TABLET ORAL at 22:37

## 2025-01-26 RX ADMIN — FENTANYL CITRATE 50 MCG: 50 INJECTION INTRAMUSCULAR; INTRAVENOUS at 10:35

## 2025-01-26 RX ADMIN — PANTOPRAZOLE SODIUM 40 MG: 40 TABLET, DELAYED RELEASE ORAL at 20:54

## 2025-01-26 RX ADMIN — HYDROMORPHONE HYDROCHLORIDE 0.2 MG: 0.2 INJECTION, SOLUTION INTRAMUSCULAR; INTRAVENOUS; SUBCUTANEOUS at 05:50

## 2025-01-26 RX ADMIN — LABETALOL HYDROCHLORIDE 10 MG: 5 INJECTION, SOLUTION INTRAVENOUS at 11:04

## 2025-01-26 RX ADMIN — INSULIN ASPART 2 UNITS: 100 INJECTION, SOLUTION INTRAVENOUS; SUBCUTANEOUS at 01:33

## 2025-01-26 RX ADMIN — SENNOSIDES AND DOCUSATE SODIUM 1 TABLET: 50; 8.6 TABLET ORAL at 12:38

## 2025-01-26 RX ADMIN — ACETAMINOPHEN 975 MG: 325 TABLET, FILM COATED ORAL at 20:53

## 2025-01-26 RX ADMIN — ONDANSETRON 4 MG: 2 INJECTION INTRAMUSCULAR; INTRAVENOUS at 09:10

## 2025-01-26 RX ADMIN — FENTANYL CITRATE 50 MCG: 50 INJECTION, SOLUTION INTRAMUSCULAR; INTRAVENOUS at 07:36

## 2025-01-26 RX ADMIN — SUGAMMADEX 200 MG: 100 INJECTION, SOLUTION INTRAVENOUS at 10:17

## 2025-01-26 RX ADMIN — HYDROMORPHONE HYDROCHLORIDE 0.2 MG: 0.2 INJECTION, SOLUTION INTRAMUSCULAR; INTRAVENOUS; SUBCUTANEOUS at 15:51

## 2025-01-26 ASSESSMENT — ACTIVITIES OF DAILY LIVING (ADL)
ADLS_ACUITY_SCORE: 70
ADLS_ACUITY_SCORE: 71
ADLS_ACUITY_SCORE: 70
ADLS_ACUITY_SCORE: 74
ADLS_ACUITY_SCORE: 71
ADLS_ACUITY_SCORE: 71
ADLS_ACUITY_SCORE: 74
ADLS_ACUITY_SCORE: 70
ADLS_ACUITY_SCORE: 74
ADLS_ACUITY_SCORE: 74
ADLS_ACUITY_SCORE: 71
ADLS_ACUITY_SCORE: 70
ADLS_ACUITY_SCORE: 71
DEPENDENT_IADLS:: TRANSPORTATION
ADLS_ACUITY_SCORE: 74
ADLS_ACUITY_SCORE: 74
ADLS_ACUITY_SCORE: 71
ADLS_ACUITY_SCORE: 74
ADLS_ACUITY_SCORE: 70
ADLS_ACUITY_SCORE: 74
ADLS_ACUITY_SCORE: 70

## 2025-01-26 ASSESSMENT — COPD QUESTIONNAIRES: COPD: 0

## 2025-01-26 NOTE — PLAN OF CARE
"PRIMARY DIAGNOSIS:Left Humerus Fracture , Fall   OUTPATIENT/OBSERVATION GOALS TO BE MET BEFORE DISCHARGE:  1. Pain Status: Improved but still requiring IV narcotics.    2. Return to near baseline physical activity: No    3. Cleared for discharge by consultants (if involved): No    Discharge Planner Nurse   Safe discharge environment identified: Yes  Barriers to discharge: Yes  A & O X 4 with forgetfulness. Noted to be tearful, C/O pain rating at 10. Prn Dilaudid given X 1. Patient reported relief after prn pain medication given. No S/SX of respiratory distress. Lung sounds clear. Denies chills, shortness of breath, dizziness, nausea, vomit, or diarrhea. Bowel sounds present and active. Colostomy patent . Passing flatus. Pure wick in place with adequate output.  AX 2 with cares. On contact isolation for MRSA. Redness/discoloration noted to lower extremities. On Mod CHO diet. PIV saline lock. NPO status at midnight. Open Reduction Fixation scheduled on 1/26. Left arm in a sling. CMS intact. Denies numbness, or tingling. Scheduled Tylenol, prn Oxycodone, and Dilaudid available for pain management. On blood glucose monitoring and sliding scale for DM 2 management. Afebrile. Orthopedic/PT/OT/CM/SW following.   Will continue to provide supportive cares.   /48 (BP Location: Right arm)   Pulse 77   Temp 98.9  F (37.2  C) (Oral)   Resp 16   Ht 1.6 m (5' 3\")   Wt 96.4 kg (212 lb 8.4 oz)   LMP  (LMP Unknown)   SpO2 98%   BMI 37.65 kg/m             Entered by: Radha Avery RN 01/26/2025 3:07 AM     Problem: Adult Inpatient Plan of Care  Goal: Plan of Care Review  Description: The Plan of Care Review/Shift note should be completed every shift.  The Outcome Evaluation is a brief statement about your assessment that the patient is improving, declining, or no change.  This information will be displayed automatically on your shift  note.  Outcome: Progressing  Flowsheets (Taken 1/25/2025 2021)  Plan of Care " "Reviewed With: patient  Goal: Patient-Specific Goal (Individualized)  Description: You can add care plan individualizations to a care plan. Examples of Individualization might be:  \"Parent requests to be called daily at 9am for status\", \"I have a hard time hearing out of my right ear\", or \"Do not touch me to wake me up as it startles  me\".  Outcome: Progressing  Goal: Absence of Hospital-Acquired Illness or Injury  Outcome: Progressing  Intervention: Identify and Manage Fall Risk  Recent Flowsheet Documentation  Taken 1/25/2025 2009 by Radha Avery RN  Safety Promotion/Fall Prevention:   activity supervised   assistive device/personal items within reach   clutter free environment maintained   nonskid shoes/slippers when out of bed   patient and family education   room near nurse's station   room organization consistent   safety round/check completed  Intervention: Prevent Skin Injury  Recent Flowsheet Documentation  Taken 1/25/2025 2009 by Radha Avery RN  Body Position:   right   turned  Intervention: Prevent and Manage VTE (Venous Thromboembolism) Risk  Recent Flowsheet Documentation  Taken 1/25/2025 2009 by Radha Avery RN  VTE Prevention/Management: SCDs off (sequential compression devices)  Intervention: Prevent Infection  Recent Flowsheet Documentation  Taken 1/25/2025 2009 by Radha Avery RN  Infection Prevention:   single patient room provided   hand hygiene promoted  Goal: Optimal Comfort and Wellbeing  Outcome: Progressing  Intervention: Monitor Pain and Promote Comfort  Recent Flowsheet Documentation  Taken 1/25/2025 2008 by Radha Avery RN  Pain Management Interventions:   medication (see MAR)   rest  Goal: Readiness for Transition of Care  Outcome: Progressing     Problem: Skin Injury Risk Increased  Goal: Skin Health and Integrity  Outcome: Progressing  Intervention: Plan: Nurse Driven Intervention: Moisture Management  Recent Flowsheet Documentation  Taken " 1/25/2025 2009 by Radha Avery RN  Moisture Interventions:   Incontinence pad   Urinary collection device  Taken 1/25/2025 2000 by Radha Avery RN  Moisture Interventions: No brief in bed  Bathing/Skin Care: (T &R) other (see comments)  Intervention: Plan: Nurse Driven Intervention: Friction and Shear  Recent Flowsheet Documentation  Taken 1/25/2025 2009 by Radha Avery RN  Friction/Shear Interventions: HOB 30 degrees or less  Intervention: Optimize Skin Protection  Recent Flowsheet Documentation  Taken 1/25/2025 2009 by Radha Avery RN  Activity Management: activity adjusted per tolerance  Head of Bed (HOB) Positioning: HOB at 20-30 degrees     Problem: Comorbidity Management  Goal: Blood Glucose Levels Within Targeted Range  Outcome: Progressing  Intervention: Monitor and Manage Glycemia  Recent Flowsheet Documentation  Taken 1/25/2025 2009 by Radha Avery RN  Medication Review/Management: medications reviewed  Goal: Blood Pressure in Desired Range  Outcome: Progressing  Intervention: Maintain Blood Pressure Management  Recent Flowsheet Documentation  Taken 1/25/2025 2009 by Radha Avery RN  Medication Review/Management: medications reviewed     Please review provider order for any additional goals.   Nurse to notify provider when observation goals have been met and patient is ready for discharge.      Plan of Care Reviewed With: patient

## 2025-01-26 NOTE — PROGRESS NOTES
Problem: Occupational Therapy Goal  Goal: Occupational Therapy Goal  Description: Goals to be met by: 7 days 7/10/2020     Patient will increase functional independence with ADLs by performing:    Pt to complete self feeding with set-up  Pt to complete g/h skills with set-up progressing  Pt to complete UE dressing with MIN A progressing   Pt to complete LE dressing with MIN A- progressing   Pt to complete t/f to BSC with MIN A   Pt to tolerate OOB to chair x 3 hours to increase endurance for functional activity. - progressing     Outcome: Ongoing, Progressing     Pt progressing well towards OT goals. OT POC remains appropriate for patient on this date. Pt will continue to benefit from skilled OT to address the deficits affecting her occupational performance.     Viji Pan OTR/L  7/10/20   "PROGRESS NOTE    SUBJECTIVE  Patient seen and examined, overall no changes from yesterday.  No acute event overnight.  We discussed again the plan for surgery today.  All questions were answered.    PHYSICAL EXAM  General: No acute distress. Alert and oriented to person, time and place.  Pulmonary: Breathing comfortably on room air.   Cardiac: Noncyanotic  Left upper extremity:  Splint clean, dry and intact.   Wiggles fingers, thumbs up, OK sign, crosses fingers, flexes and extends elbow 5/5, shoulder abduction intact  Palpable radial pulse  SILT to C5-T1 distributions    LABS  Lab Results   Component Value Date    WBC 8.9 01/25/2025    WBC 7.2 04/26/2021     Lab Results   Component Value Date    RBC 3.98 01/25/2025    RBC 3.77 04/26/2021     Lab Results   Component Value Date    HGB 12.1 01/25/2025    HGB 11.7 04/26/2021     Lab Results   Component Value Date    HCT 37.2 01/25/2025    HCT 35.0 04/26/2021     No components found for: \"MCT\"  Lab Results   Component Value Date    MCV 94 01/25/2025    MCV 93 04/26/2021     Lab Results   Component Value Date    MCH 30.4 01/25/2025    MCH 31.0 04/26/2021     Lab Results   Component Value Date    MCHC 32.5 01/25/2025    MCHC 33.4 04/26/2021     Lab Results   Component Value Date    RDW 12.3 01/25/2025    RDW 12.5 04/26/2021     Lab Results   Component Value Date     01/25/2025     04/26/2021         IMAGING  Imaging reviewed, periprosthetic left humerus fracture with what appears to be at least partially well-fixed hemiarthroplasty stem  ASSESSMENT/PLAN  Plan for OR for open reduction internal fixation of left periprosthetic humerus fracture, possible humeral component revision today    The risks and benefits of proceeding with surgery were discussed and questions answered. This included nonoperative alternatives. We discussed the risks of general anesthesia  which include death, heart attack, stroke, urinary tract infection, pneumonia. We discussed the " surgical risks of infection, which may require further surgery or antibiotics. We discussed the risk of the procedure not fully or even partially relieving her pain.  We discussed that some patients do not have improvement after the surgery.  We discussed the risk of implant complications, risk of bleeding and transfusion risk. We discussed the risk of infection. Given her success with the hemiarthroplasty component, and with her glenoid bone loss, if revision were necessary, we discussed revising to another hemiarthroplasty with a convertible stem rather than a reverse total shoulder. We discussed the risk of hardware problems or nonunion. We discussed the postoperative course and weightbearing restrictions. We discussed possible sensory and motor injury.

## 2025-01-26 NOTE — PLAN OF CARE
PRIMARY DIAGNOSIS: Left Humerus Fracture  OUTPATIENT/OBSERVATION GOALS TO BE MET BEFORE DISCHARGE:  1. Pain Status: Improved but still requiring IV narcotics.    2. Return to near baseline physical activity: No    3. Cleared for discharge by consultants (if involved): No    Discharge Planner Nurse   Safe discharge environment identified: No  Barriers to discharge: Yes       Entered by: Irma Roche RN 01/26/2025  A&Ox4; pt lethargic upon arrival to unit. VSS; weened to room air post surgery. Capno in place. Pt reports 7/10 LUE pain; PRN oxycodone and IV dilaudid given. Ice applied. Denies SOB, N/V/D. Purewick in place. BS checks w/ insulin coverage given. Contact for MRSA. Progressive diet; tolerating well and now on regular. Orthopedic/PT/OT/CM/SW following. Call light in reach; pt able to make needs known. Bed alarm on for safety.    Please review provider order for any additional goals.   Nurse to notify provider when observation goals have been met and patient is ready for discharge.    Goal Outcome Evaluation:      Plan of Care Reviewed With: patient    Overall Patient Progress: improvingOverall Patient Progress: improving    Outcome Evaluation: Pt back to unit from surgery. A&Ox4; lethargic. VSS; weened to room air. C/O LUE pain; PRN oxycodone given. Purewick in place. Ortho Surg, PT/OT, SW/CM following

## 2025-01-26 NOTE — PLAN OF CARE
"PRIMARY DIAGNOSIS:Left Humerus Fracture , Fall   OUTPATIENT/OBSERVATION GOALS TO BE MET BEFORE DISCHARGE:  1. Pain Status: Improved but still requiring IV narcotics.    2. Return to near baseline physical activity: No    3. Cleared for discharge by consultants (if involved): No    Discharge Planner Nurse   Safe discharge environment identified: Yes  Barriers to discharge: Yes  A & O X 4 with forgetfulness. No S/SX of respiratory distress. Lung sounds clear. Denies chills, shortness of breath, dizziness, nausea, vomit, or diarrhea. Bowel sounds present and active. Colostomy patent . Passing flatus. Pure wick in place with adequate output.  AX 2 with cares. On contact isolation for MRSA. Redness/discoloration noted to lower extremities. PIV saline lock. NPO status effective at midnight. Open Reduction Internal Fixation scheduled this morning. CHG bath completed.Left arm in a sling. CMS intact. Denies numbness, or tingling. Scheduled Tylenol, prn Oxycodone, and Dilaudid available for pain management. C/O pain rating at 7-10, prn Dilaudid given X 3, Oxycodone given X 1-See MAR. Patient reported relief after prn pain medications given. On blood glucose monitoring and sliding scale for DM 2 management. Afebrile. Orthopedic/PT/OT/CM/SW following.   Will continue to provide supportive cares.   /48 (BP Location: Right arm)   Pulse 77   Temp 98.9  F (37.2  C) (Oral)   Resp 16   Ht 1.6 m (5' 3\")   Wt 96.4 kg (212 lb 8.4 oz)   LMP  (LMP Unknown)   SpO2 98%   BMI 37.65 kg/m             Entered by: Radha Avery RN 01/26/2025 6:09 AM     Problem: Adult Inpatient Plan of Care  Goal: Plan of Care Review  Description: The Plan of Care Review/Shift note should be completed every shift.  The Outcome Evaluation is a brief statement about your assessment that the patient is improving, declining, or no change.  This information will be displayed automatically on your shift  note.  Outcome: Progressing  Flowsheets " "(Taken 1/25/2025 2021)  Plan of Care Reviewed With: patient  Goal: Patient-Specific Goal (Individualized)  Description: You can add care plan individualizations to a care plan. Examples of Individualization might be:  \"Parent requests to be called daily at 9am for status\", \"I have a hard time hearing out of my right ear\", or \"Do not touch me to wake me up as it startles  me\".  Outcome: Progressing  Goal: Absence of Hospital-Acquired Illness or Injury  Outcome: Progressing  Intervention: Identify and Manage Fall Risk  Recent Flowsheet Documentation  Taken 1/25/2025 2009 by Radha Avery RN  Safety Promotion/Fall Prevention:   activity supervised   assistive device/personal items within reach   clutter free environment maintained   nonskid shoes/slippers when out of bed   patient and family education   room near nurse's station   room organization consistent   safety round/check completed  Intervention: Prevent Skin Injury  Recent Flowsheet Documentation  Taken 1/25/2025 2009 by Radha Avery RN  Body Position:   right   turned  Intervention: Prevent and Manage VTE (Venous Thromboembolism) Risk  Recent Flowsheet Documentation  Taken 1/25/2025 2009 by Radha Avery RN  VTE Prevention/Management: SCDs off (sequential compression devices)  Intervention: Prevent Infection  Recent Flowsheet Documentation  Taken 1/25/2025 2009 by Radha Avery RN  Infection Prevention:   single patient room provided   hand hygiene promoted  Goal: Optimal Comfort and Wellbeing  Outcome: Progressing  Intervention: Monitor Pain and Promote Comfort  Recent Flowsheet Documentation  Taken 1/25/2025 2008 by Radha Avery RN  Pain Management Interventions:   medication (see MAR)   rest  Goal: Readiness for Transition of Care  Outcome: Progressing     Problem: Skin Injury Risk Increased  Goal: Skin Health and Integrity  Outcome: Progressing  Intervention: Plan: Nurse Driven Intervention: Moisture Management  Recent " Flowsheet Documentation  Taken 1/25/2025 2009 by Radha Avery RN  Moisture Interventions:   Incontinence pad   Urinary collection device  Taken 1/25/2025 2000 by Radha Avery RN  Moisture Interventions: No brief in bed  Bathing/Skin Care: (T &R) other (see comments)  Intervention: Plan: Nurse Driven Intervention: Friction and Shear  Recent Flowsheet Documentation  Taken 1/25/2025 2009 by Radha Avery RN  Friction/Shear Interventions: HOB 30 degrees or less  Intervention: Optimize Skin Protection  Recent Flowsheet Documentation  Taken 1/25/2025 2009 by Radha Avery RN  Activity Management: activity adjusted per tolerance  Head of Bed (HOB) Positioning: HOB at 20-30 degrees     Problem: Comorbidity Management  Goal: Blood Glucose Levels Within Targeted Range  Outcome: Progressing  Intervention: Monitor and Manage Glycemia  Recent Flowsheet Documentation  Taken 1/25/2025 2009 by Radha Avery RN  Medication Review/Management: medications reviewed  Goal: Blood Pressure in Desired Range  Outcome: Progressing  Intervention: Maintain Blood Pressure Management  Recent Flowsheet Documentation  Taken 1/25/2025 2009 by Radha Avery RN  Medication Review/Management: medications reviewed     Please review provider order for any additional goals.   Nurse to notify provider when observation goals have been met and patient is ready for discharge.      Plan of Care Reviewed With: patient

## 2025-01-26 NOTE — PROGRESS NOTES
"Pt visited about CPAP @ noc order. Pt explained that she does not wear a CPAP at home and has very mild ANNETTE.    BP (!) 154/56 (BP Location: Right arm)   Pulse 80   Temp 98.9  F (37.2  C) (Oral)   Resp 20   Ht 1.6 m (5' 3\")   Wt 96.4 kg (212 lb 8.4 oz)   LMP  (LMP Unknown)   SpO2 97%   BMI 37.65 kg/m      Josette Joseph, RT    "

## 2025-01-26 NOTE — PLAN OF CARE
PRIMARY DIAGNOSIS: Left arm Fx from Fall  OUTPATIENT/OBSERVATION GOALS TO BE MET BEFORE DISCHARGE:  1. Pain Status: Improved but still requiring IV narcotics.    2. Return to near baseline physical activity: No    3. Cleared for discharge by consultants (if involved): No    Discharge Planner Nurse   Safe discharge environment identified: No  Barriers to discharge: Yes       Entered by: Renate Haynes RN 2025    Pt is Aox3, tearful in pain when moved reporting 15/10 pain on left arm. IV 0.2 mg dilaudid given with relief. On RA. LUE in splint, very painful with slight movement. Ax2 reposition if able. Colostomy 2 piece, PW in place. Bilateral legs with discoloration, Hx of cellulitis. Unable to see bottom due to limited turn ability. PIV is SL with site bruised. B. On a mod carb diet, tray set up. Able to make needs know. Ortho following, anticipate surgery . NPO at midnight.    Please review provider order for any additional goals. Nurse to notify provider when observation goals have been met and patient is ready for discharge.    Plan of Care Reviewed With: patient  Overall Patient Progress: no change  Outcome Evaluation: A&Ox4, tearful pain when moved or repositioned. IV dilaudid given. Left arm in split, IV SL. Plan for surgery     Problem: Comorbidity Management  Goal: Blood Glucose Levels Within Targeted Range  2025 by Renate Haynes RN  Outcome: Progressing  2025 184 by Renate Haynes RN  Outcome: Progressing  2025 184 by Renate Haynes RN  Outcome: Progressing  Intervention: Monitor and Manage Glycemia  Recent Flowsheet Documentation  Taken 2025 1455 by Renate Haynes RN  Medication Review/Management: medications reviewed  Goal: Blood Pressure in Desired Range  2025 184 by Renate Haynes RN  Outcome: Progressing  2025 184 by [place]  Renate HACKETT RN  Outcome: Progressing  1/25/2025 1844 by Renate Haynes RN  Outcome: Progressing  Intervention: Maintain Blood Pressure Management  Recent Flowsheet Documentation  Taken 1/25/2025 1455 by Renate Haynes RN  Medication Review/Management: medications reviewed     Problem: Skin Injury Risk Increased  Goal: Skin Health and Integrity  1/25/2025 1844 by Renate Haynes RN  Outcome: Progressing  1/25/2025 1844 by Renate Haynes RN  Outcome: Progressing  1/25/2025 1844 by Renate Haynes RN  Outcome: Progressing  Intervention: Plan: Nurse Driven Intervention: Moisture Management  Recent Flowsheet Documentation  Taken 1/25/2025 1455 by Renate Haynes RN  Moisture Interventions:   Incontinence pad   Urinary collection device  Intervention: Plan: Nurse Driven Intervention: Friction and Shear  Recent Flowsheet Documentation  Taken 1/25/2025 1455 by Renate Haynes RN  Friction/Shear Interventions: HOB 30 degrees or less  Intervention: Optimize Skin Protection  Recent Flowsheet Documentation  Taken 1/25/2025 1734 by Renate Haynes RN  Activity Management: activity adjusted per tolerance  Head of Bed (HOB) Positioning: HOB at 30 degrees  Taken 1/25/2025 1455 by Renate Haynes, RN  Activity Management: activity adjusted per tolerance

## 2025-01-26 NOTE — ANESTHESIA POSTPROCEDURE EVALUATION
Patient: Chasity Hodgson    Procedure: Procedure(s):  OPEN REDUCTION INTERNAL FIXATION, FRACTURE, HUMERUS, PROXIMAL       Anesthesia Type:  General    Note:  Disposition: Inpatient   Postop Pain Control:    PONV: No   Neuro/Psych: Uneventful            Sign Out: Acceptable/Baseline neuro status   Airway/Respiratory: Uneventful            Sign Out: Acceptable/Baseline resp. status   CV/Hemodynamics: Uneventful            Sign Out: Acceptable CV status; No obvious hypovolemia; No obvious fluid overload   Other NRE:    DID A NON-ROUTINE EVENT OCCUR? No           Last vitals:  Vitals Value Taken Time   /71 01/26/25 1135   Temp 96.98  F (36.1  C) 01/26/25 1134   Pulse 77 01/26/25 1135   Resp 10 01/26/25 1107   SpO2 99 % 01/26/25 1135   Vitals shown include unfiled device data.    Electronically Signed By: Jacquie Carter MD  January 26, 2025  11:39 AM

## 2025-01-26 NOTE — OP NOTE
OPERATIVE NOTE    Name: Chasity Hodgson  MRN: [id number]  Age: 79 year old    YOB: 1945    Date of Procedure: 1/26/2025      Pre-operative Diagnosis:   Left periprosthetic humerus fracture    Post-operative Diagnosis:    Same    Procedure:   ORIF left periprosthetic humerus fracture with stem retention  Assistant:  JOANA Cassidy    A skilled first assistant was necessary for this procedure for assistance with patient positioning, prepping, draping, surgical visualization, wound closure, and application of the dressing.     Anesthesia:  1.  General    Complications:  None    Estimated blood loss:   100 mm    Transfusions:  None    Fluids:  Per anesthesia    Specimens:  None    Components:  1 Synthes particular 6-hole proximal humerus plate  One 2.0 mm cable      Indications:   This is a 79-year-old woman who presented to the Emergency Department after sustaining a fall when she tripped on her walker.  She was found to have a left periprosthetic humerus fracture.  She underwent her left hemiarthroplasty in 2022.  Overall this was doing fairly well other than occasional pain until this recent fall.  Due to the nature of displacement, we discussed treatment options both nonsurgical and surgical.  Again, given the displacement and her independence at baseline, she elected to proceed with surgery.  We discussed both open reduction internal fixation as well as the potential need for revision of the stem.      Informed Consent:  Preoperatively, the risks, benefits, and possible complications of the procedure were discussed. The risks discussed included but were not limited to wound healing complications, bleeding, transfusion, stiffness and dislocation and persistent pain.  We discussed the risk of periprosthetic fracture, infection, VTE, the need for further revision surgeries, and anesthesia complications.  All of the questions were satisfactorily answered and the patient wished to proceed with  surgery.        Description of Procedure:   Chasity Hodgson was encountered in the preoperative area and consent was obtained from the patient/family with the patient awake and the correct operative site was marked. The patient was then brought back to the operative suite, placed in the beachchair position and brought under general anesthesia by the anesthesia provider.  The patient was then prepped and draped in normal sterile fashion.         Procedural Pause:   The patient's correct identity, side, site, and procedure to be performed was verified.  Intravenous Ancef and vancomycin were administered prior to skin incision.    Procedure details  Attention was then turned towards the patient.     A longitudinal incision was made centered over the coracoid just laterally and curving down laterally to the biceps for a standard deltopectoral/anterolateral approach to the humerus.  Dissection was carried through skin and subcutaneous tissue.  The coracoid was identified and the proximal humerus was palpated.  The interval between the deltoid and the pectoralis was carefully developed further.  The deltoid was then carefully elevated off of the proximal humerus, keeping the attachment intact distally.  After this interval had been identified, dissection was carried through distally to identify the distal fragment.  Careful mobilization of the lateral soft tissue was performed to avoid damage to the radial nerve which was kept laterally.  After both fracture fragments had been appropriately identified.  Any intervening hematoma was carefully excised.  At this point the proximal fracture was mobilized enough to identify the stem.  This was found to be well-seated and was not loose.  Therefore a revision of the stem was not performed.  A provisional reduction was then achieved using clamps.  2 Nice loops in cerclage fashion were placed to assist with the reduction.  This was checked on fluoroscopy.  After this a [employer] 6  hole periarticular plate was brought in and was affixed to the distal fragment using a 3.5 nonlocking screw.  Tension was then turned towards the proximal cluster.  Multiple locking screws were placed through this proximal cluster.  Most of these were unicortical but 2 were able to be placed in a bicortical fashion for improved fixation.  The medial calcar was inspected.  There was some comminution medially and this was carefully approximated into the bone and a cerclage cable was placed.  This was carefully placed to avoid damage to the axillary nerve at this level .  At this point final fluoroscopic images were taken showing good fracture reduction and very stable fracture.  The glenohumeral joint remained reduced.  The wound was then copiously irrigated with normal saline.  Vancomycin powder was applied.  The deltoid insertion that had been left off was then repaired back to the plate.  The deltopectoral interval was closed with 0 Vicryl suture and a 1 strata fix.  The skin was then closed with 2-0 Vicryl deep dermal suture and Zipline for the skin.  Dermabond was applied.  The patient was then placed in a soft dressing.  She was recovered from anesthesia without complication taken the PACU.  I was present for all major portions of this procedure. All sponge, needle and instrument counts were correct at the end of the case.     Postoperative plan:       Patient will be nonweightbearing in a sling.  She may do gentle range of motion of the elbow wrist and shoulder with pendulums only.  She will follow-up in 2 weeks for wound check and repeat x-rays.  All questions were answered today.      Erich Myrick MD

## 2025-01-26 NOTE — PLAN OF CARE
"PRIMARY DIAGNOSIS:Left Humerus Fracture , Fall   OUTPATIENT/OBSERVATION GOALS TO BE MET BEFORE DISCHARGE:  1. Pain Status: Improved but still requiring IV narcotics.    2. Return to near baseline physical activity: No    3. Cleared for discharge by consultants (if involved): No    Discharge Planner Nurse   Safe discharge environment identified: Yes  Barriers to discharge: Yes  A & O X 4 with forgetfulness. No S/SX of respiratory distress. Lung sounds clear. Denies chills, shortness of breath, dizziness, nausea, vomit, or diarrhea. Bowel sounds present and active. Colostomy patent . Passing flatus. Pure wick in place with adequate output.  AX 2 with cares. On contact isolation for MRSA. Redness/discoloration noted to lower extremities. PIV saline lock. NPO status effective at midnight. Open Reduction Fixation scheduled today. Left arm in a sling. CMS intact. Denies numbness, or tingling. Scheduled Tylenol, prn Oxycodone, and Dilaudid available for pain management. C/O pain rating at 7-10, prn Dilaudid given X 2, Oxycodone given X 1-See MAR. Patient reported relief after prn pain medications given. On blood glucose monitoring and sliding scale for DM 2 management. Afebrile. Orthopedic/PT/OT/CM/SW following.   Will continue to provide supportive cares.   /48 (BP Location: Right arm)   Pulse 77   Temp 98.9  F (37.2  C) (Oral)   Resp 16   Ht 1.6 m (5' 3\")   Wt 96.4 kg (212 lb 8.4 oz)   LMP  (LMP Unknown)   SpO2 98%   BMI 37.65 kg/m             Entered by: Radha Avery RN 01/26/2025 3:21 AM     Problem: Adult Inpatient Plan of Care  Goal: Plan of Care Review  Description: The Plan of Care Review/Shift note should be completed every shift.  The Outcome Evaluation is a brief statement about your assessment that the patient is improving, declining, or no change.  This information will be displayed automatically on your shift  note.  Outcome: Progressing  Flowsheets (Taken 1/25/2025 2021)  Plan of Care " "Reviewed With: patient  Goal: Patient-Specific Goal (Individualized)  Description: You can add care plan individualizations to a care plan. Examples of Individualization might be:  \"Parent requests to be called daily at 9am for status\", \"I have a hard time hearing out of my right ear\", or \"Do not touch me to wake me up as it startles  me\".  Outcome: Progressing  Goal: Absence of Hospital-Acquired Illness or Injury  Outcome: Progressing  Intervention: Identify and Manage Fall Risk  Recent Flowsheet Documentation  Taken 1/25/2025 2009 by Radha Avery RN  Safety Promotion/Fall Prevention:   activity supervised   assistive device/personal items within reach   clutter free environment maintained   nonskid shoes/slippers when out of bed   patient and family education   room near nurse's station   room organization consistent   safety round/check completed  Intervention: Prevent Skin Injury  Recent Flowsheet Documentation  Taken 1/25/2025 2009 by Radha Avery RN  Body Position:   right   turned  Intervention: Prevent and Manage VTE (Venous Thromboembolism) Risk  Recent Flowsheet Documentation  Taken 1/25/2025 2009 by Radha Avery RN  VTE Prevention/Management: SCDs off (sequential compression devices)  Intervention: Prevent Infection  Recent Flowsheet Documentation  Taken 1/25/2025 2009 by Radha Avery RN  Infection Prevention:   single patient room provided   hand hygiene promoted  Goal: Optimal Comfort and Wellbeing  Outcome: Progressing  Intervention: Monitor Pain and Promote Comfort  Recent Flowsheet Documentation  Taken 1/25/2025 2008 by Radha Avery RN  Pain Management Interventions:   medication (see MAR)   rest  Goal: Readiness for Transition of Care  Outcome: Progressing     Problem: Skin Injury Risk Increased  Goal: Skin Health and Integrity  Outcome: Progressing  Intervention: Plan: Nurse Driven Intervention: Moisture Management  Recent Flowsheet Documentation  Taken " 1/25/2025 2009 by Radha Avery RN  Moisture Interventions:   Incontinence pad   Urinary collection device  Taken 1/25/2025 2000 by Radha Avery RN  Moisture Interventions: No brief in bed  Bathing/Skin Care: (T &R) other (see comments)  Intervention: Plan: Nurse Driven Intervention: Friction and Shear  Recent Flowsheet Documentation  Taken 1/25/2025 2009 by Radha Avery RN  Friction/Shear Interventions: HOB 30 degrees or less  Intervention: Optimize Skin Protection  Recent Flowsheet Documentation  Taken 1/25/2025 2009 by Radha Avery RN  Activity Management: activity adjusted per tolerance  Head of Bed (HOB) Positioning: HOB at 20-30 degrees     Problem: Comorbidity Management  Goal: Blood Glucose Levels Within Targeted Range  Outcome: Progressing  Intervention: Monitor and Manage Glycemia  Recent Flowsheet Documentation  Taken 1/25/2025 2009 by Radha Avery RN  Medication Review/Management: medications reviewed  Goal: Blood Pressure in Desired Range  Outcome: Progressing  Intervention: Maintain Blood Pressure Management  Recent Flowsheet Documentation  Taken 1/25/2025 2009 by Radha Avery RN  Medication Review/Management: medications reviewed     Please review provider order for any additional goals.   Nurse to notify provider when observation goals have been met and patient is ready for discharge.      Plan of Care Reviewed With: patient

## 2025-01-26 NOTE — ANESTHESIA CARE TRANSFER NOTE
Patient: Chasity Hodgson    Procedure: Procedure(s):  OPEN REDUCTION INTERNAL FIXATION, FRACTURE, HUMERUS, PROXIMAL       Diagnosis: Periprosthetic fracture around internal prosthetic shoulder joint, initial encounter [M97.8XXA, Z96.619]  Diagnosis Additional Information: No value filed.    Anesthesia Type:   General     Note:    Oropharynx: oropharynx clear of all foreign objects and spontaneously breathing  Level of Consciousness: drowsy  Oxygen Supplementation: face mask  Level of Supplemental Oxygen (L/min / FiO2): 6  Independent Airway: airway patency satisfactory and stable  Dentition: dentition unchanged  Vital Signs Stable: post-procedure vital signs reviewed and stable  Report to RN Given: handoff report given  Patient transferred to: PACU    Handoff Report: Identifed the Patient, Identified the Reponsible Provider, Reviewed the pertinent medical history, Discussed the surgical course, Reviewed Intra-OP anesthesia mangement and issues during anesthesia, Set expectations for post-procedure period and Allowed opportunity for questions and acknowledgement of understanding    Vitals:  Vitals Value Taken Time   BP     Temp     Pulse     Resp     SpO2         Electronically Signed By: Dean Dennis Severson, APRN CRNA  January 26, 2025  10:40 AM

## 2025-01-26 NOTE — BRIEF OP NOTE
[facility]    Brief Operative Note    Pre-operative diagnosis: Periprosthetic fracture around internal prosthetic shoulder joint, initial encounter [M97.8XXA, Z96.949]  Post-operative diagnosis Left periprosthetic proximal humerus fracture    Procedure: OPEN REDUCTION INTERNAL FIXATION, FRACTURE, HUMERUS, PROXIMAL, Left - Arm    Surgeon: Surgeons and Role:     * Erich Myrick MD - Primary  Anesthesia: General   Estimated Blood Loss: 100 ml    Drains: None  Specimens: * No specimens in log *  Findings:   None.  Complications: None.  Implants:   Implant Name Type Inv. Item Serial No.  Lot No. LRB No. Used Action   PLATE PERIART HUMERUS 6H LT [id number] - [id number] Metallic Hardware/[employer] PLATE PERIART HUMERUS 6H LT 3.6I520BP  SYNTHES-[employer]AT[employer] 8005 LOAD [id number] Left 1 Implanted   IMP CABLE W/CRIMP SYN [id number] 298.801.01S - [id number] Metallic Hardware/[employer] IMP CABLE W/CRIMP SYN 1.0W991KH 298.801.01S  SYNTHES-STRATEC [id number] Left 1 Implanted   IMP SCR SYN CORTEX 3.5X24MM SELF TAP .824 - [id number] Metallic Hardware/[employer] IMP SCR SYN CORTEX 3.5X24MM SELF TAP .824  SYNTHES-STRATEC 8002 LOAD [id number] Left 2 Implanted   IMP SCR SYN 3.5X30MM LOCKING W/STARDRIVE .111 - [id number] Metallic Hardware/[employer] IMP SCR SYN 3.5X30MM LOCKING W/STARDRIVE .111  SYNTHES-STRATEC 8002 LOAD [id number] Left 1 Implanted   IMP SCR SYN 3.5X22MM LOCKING W/STARDRIVE .107 - [id number] Metallic Hardware/[employer] IMP SCR SYN 3.5X22MM LOCKING W/STARDRIVE .107  SYNTHES-STRATEC 8002 LOAD [id number] Left 1 Implanted   IMP SCR SYN 3.5X20MM LOCKING W/STARDRIVE .106 - [id number] Metallic Hardware/[employer] IMP SCR SYN 3.5X20MM LOCKING W/STARDRIVE .106  SYNTHES-STRATEC 8002 [id number] Left 2 Implanted   IMP SCR SYN 3.5X16MM LOCKING W/STARDRIVE .104 - [id number] Metallic Hardware/[employer] IMP SCR SYN 3.5X16MM LOCKING W/STARDRIVE .104  [facility]ES[facility]  8002 LOAD [id number] Left 2 Implanted   IMP SCR SYN CORTEX 3.5X26MM SELF TAP .826 - [id number] Metallic Hardware/[employer] IMP SCR SYN CORTEX 3.5X26MM SELF TAP .826  SYNTHES-STRATEC 8002 LOAD [id number] Left 1 Implanted   IMP SCR SYN 3.5X26MM LOCKING W/STARDRIVE .109 - [id number] Metallic Hardware/[employer] IMP SCR SYN 3.5X26MM LOCKING W/STARDRIVE .109  SYNTHES-STRATEC 8002 LOAD [id number] Left 1 Implanted   IMP SCR SYN CORTEX 3.5X26MM SELF TAP .826 - [id number] Metallic Hardware/[employer] IMP SCR SYN CORTEX 3.5X26MM SELF TAP .826  SYNTHES-STRATEC 8002 LOAD [id number] Left 1 Implanted     Postoperative Plan:    To PACU in stable condition, then to floor  Ancef x 2 doses   May restart home anticoagulation tomorrow  ASA 81 daily for 3 weeks  NWB LUE in sling, OK for gentle elbow ROM and pendulums  Dressing: Dressings to remain in place 3 to 5 days.  Outer dressings may be removed at that time and then okay for showering.    Erich Myrick MD on 1/26/2025 at 10:20 AM

## 2025-01-26 NOTE — PROGRESS NOTES
[facility]    Medicine Progress Note - Hospitalist Service    Date of Admission:  1/25/2025    Assessment & Plan   Chasity Hodgson is a 79 year old female with a past medical history of nonocclusive CAD, hyperlipidemia, hypertension, PAD, diabetes mellitus, hyperlipidemia, hypothyroidism, history of DVT not on blood thinners, ANNETTE who presents with fall, arm pain and periprosthetic left humeral fracture     POD #0 ORIF left proximal humerus  Mechanical fall complicated by left periprosthetic humeral fracture    - patient lives in a [facility] by herself      - she states that she was putting a flowerpot down when she lost her balance and fell against her walker onto her left side    - X-ray/CT of the humerus showed an angulated periprosthetic fracture in the proximal shaft of the left humerus    - s/p ORIF    - pain control    - PT eval: will need TCU     Diabetes mellitus    - resume Lantus 10 units in the morning     - cont ISS    - can resume mealtime NovoLog once eating normally    - cont jardiance    Non-obstructive CAD, HTN, HL    - cont PTA carvedilol, ARB    - continue fenofibrate and zetia    Hypothyroidism    - continue synthroid    GERD    - continue PPI and pepcid    Obesity, ANNETTE    - CPAP    - complicates cares    Called and updated daughter     Observation Goals: -diagnostic tests and consults completed and resulted, -vital signs normal or at patient baseline, -tolerating oral intake to maintain hydration, -returns to baseline functional status, -safe disposition plan has been identified, Nurse to notify provider when observation goals have been met and patient is ready for discharge.  Diet: Advance Diet as Tolerated: Full Liquid Diet; Moderate Consistent Carb (60 g CHO per Meal) Diet    DVT Prophylaxis: Pneumatic Compression Devices, start lovenox tomorrow  Bello Catheter: Not present  Lines: None     Cardiac Monitoring: None  Code Status: Full Code      Clinically Significant Risk  "Factors Present on Admission                 # Drug Induced Platelet Defect: home medication list includes an antiplatelet medication   # Hypertension: Noted on problem list          # DMII: A1C = N/A within past 6 months   # Obesity: Estimated body mass index is 37.65 kg/m  as calculated from the following:    Height as of this encounter: 1.6 m (5' 3\").    Weight as of this encounter: 96.4 kg (212 lb 8.4 oz).       # Financial/Environmental Concerns:           Social Drivers of Health    Transportation Needs: High Risk (11/20/2023)    Transportation Needs     Within the past 12 months, has lack of transportation kept you from medical appointments, getting your medicines, non-medical meetings or appointments, work, or from getting things that you need?: Yes    Received from [employer], [employer]    Social Connections          Disposition Plan     Medically Ready for Discharge: Anticipated in 2-4 Days    Romero Andrews MD  Hospitalist Service  [facility]  Securely message with [employer] (more info)  Text page via [email or web address] Paging/Directory   ______________________________________________________________________    Interval History   I assumed care of the patient today.  She is returned to the floors from surgery.  She is doing well.  She does have some pain in her shoulder.  No chest pain or shortness of breath.  No abdominal pain or nausea.    Physical Exam   Vital Signs: Temp: 98.5  F (36.9  C) Temp src: Oral BP: 139/81 Pulse: 76   Resp: 12 SpO2: 98 % O2 Device: None (Room air) Oxygen Delivery: 2 LPM  Weight: 212 lbs 8.38 oz    Constitutional: awake, alert, cooperative, no apparent distress, and appears stated age  Eyes: Lids and lashes normal, pupils equal, round and reactive to light, extra ocular muscles intact, sclera clear, conjunctiva normal  ENT: Normocephalic, without obvious abnormality, atraumatic, sinuses nontender on " palpation, external ears without lesions, oral pharynx with moist mucous membranes, tonsils without erythema or exudates, gums normal and good dentition.  Respiratory: No increased work of breathing, good air exchange, clear to auscultation bilaterally, no crackles or wheezing  Cardiovascular: Normal apical impulse, regular rate and rhythm, normal S1 and S2, no S3 or S4, and no murmur noted  GI: No scars, normal bowel sounds, soft, non-distended, non-tender, no masses palpated, no hepatosplenomegally  Skin: bruising left shoulder  Musculoskeletal: bilat lower extrem edema    Medical Decision Making       30 MINUTES SPENT BY ME on the date of service doing chart review, history, exam, documentation & further activities per the note.      Data         Imaging results reviewed over the past 24 hrs:   Recent Results (from the past 24 hours)   XR Surgery LUBNA L/T 5 Min Fluoro w Stills    Narrative    This exam was marked as non-reportable because it will not be read by a   radiologist or a [place] non-radiologist provider.

## 2025-01-26 NOTE — ANESTHESIA PROCEDURE NOTES
Airway       Patient location during procedure: OR       Procedure Start/Stop Times: 1/26/2025 8:09 AM  Staff -        Anesthesiologist:  Jacquie Carter MD       CRNA: Severson, Dean Dennis, APRN CRNA       Performed By: CRNA  Consent for Airway        Urgency: elective  Indications and Patient Condition       Indications for airway management: blessing-procedural and airway protection       Induction type:intravenous       Mask difficulty assessment: 2 - vent by mask + OA or adjuvant +/- NMBA    Final Airway Details       Final airway type: endotracheal airway       Successful airway: ETT - single  Endotracheal Airway Details        ETT size (mm): 7.0       Successful intubation technique: video laryngoscopy       VL Blade Size: Glidescope 3       Grade View of Cords: 1       Adjucts: stylet       Position: Center       Measured from: lips       Secured at (cm): 22       Bite block used: Soft    Post intubation assessment        Placement verified by: capnometry, equal breath sounds and chest rise        Number of attempts at approach: 1       Number of other approaches attempted: 0       Secured with: tape       Ease of procedure: easy       Dentition: Intact and Unchanged    Medication(s) Administered   Medication Administration Time: 1/26/2025 8:09 AM

## 2025-01-26 NOTE — ANESTHESIA PREPROCEDURE EVALUATION
Anesthesia Pre-Procedure Evaluation    Patient: Chasity Hodgson   MRN: [id number] : 1945        Procedure : Procedure(s):  OPEN REDUCTION INTERNAL FIXATION, FRACTURE, HUMERUS, PROXIMAL  REVISION, TOTAL ARTHROPLASTY, SHOULDER          Past Medical History:   Diagnosis Date    Abdominal adhesions , ,    s/p lysis    Acne rosacea     Allergic rhinitis     Alopecia     Anemia     CAD (coronary artery disease)     Carotid stenosis     CKD (chronic kidney disease) stage 2, GFR 60-89 ml/min     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)     CPAP (continuous positive airway pressure) dependence     Depression     Diabetic gastroparesis (H)     DM2 (diabetes mellitus, type 2) (H)     DVT of axillary vein, acute right (H)     Fibromyalgia     GERD (gastroesophageal reflux disease)     Hernia of unspecified site of abdominal cavity without mention of obstruction or gangrene     bilateral    History of blood transfusion     10/ 1980    History of thrombophlebitis     HTN (hypertension)     Hypertriglyceridemia     Hypokalemia     Hypothyroidism     Mild major depression (H) 2019    Mumps     ANNETTE (obstructive sleep apnea)     Osteoarthritis of glenohumeral joint     Papillary carcinoma of thyroid (H)     s/p thyroidectomy - Ruegemer    Poliomyelitis     poor circulation right leg    Postsurgical hypothyroidism     s/p papillary thryoid cancer - Ruegemer    Pulmonary embolism (H)     Pulmonary nodule     S/P carpal tunnel release     bilateral    S/P hardware removal 2014    still with lingering foot pain    S/P shoulder surgery     bilateral    Septic joint (H)     right knee    Venous insufficiency       Past Surgical History:   Procedure Laterality Date    AMPUTATE TOE(S)  03/15/2012    Procedure:AMPUTATE TOE(S); Surgeon:RUBEN MOSES; Location:[facility] OR    AMPUTATE TOE(S)      APPENDECTOMY  1972    APPENDECTOMY      ARTHRODESIS FOOT  03/15/2012    Procedure:ARTHRODESIS FOOT; RIGHT TRIPLE ARTHRODESIS, FIFTH TOE  AMPUTATION, LATERAL LIGAMENT RECONSTRUCTION, TENDON TRANSFER AND RELEASE [MINI C-ARM, ARTHREX 4.5 AND 6.7 STAPLES, BIOCOMPOSITE TENODESIS SCREWS]; Surgeon:RUBEN MOSES; Location: OR    ARTHRODESIS FOOT Right     Right foot triple arthrodesis and removal of hardware    ARTHROSCOPY SHOULDER  06/25/2015    REVISION SUBACROMIAL DECOMPRESSION, EXCISION OF GANGLION CYST, DEBRIDEMENT AND EXCISION OF THE GLENOHUMERAL JOINT GANGLION CYST, CORACOID DECOMPRESSION, POSSIBLE SUBSCAPULARIS REPAIR AND OPEN SUBSCAPULARIS BICEP    ARTHROSCOPY SHOULDER ROTATOR CUFF REPAIR      BIOPSY  Thyroid 2002    BLADDER SURGERY      BOTOX INJECTION MEDICAL      BREAST SURGERY  Biopsy    CHOLECYSTECTOMY      CHOLECYSTECTOMY      COLONOSCOPY  2018    COLOSTOMY  02/07/2012    Procedure:COLOSTOMY; CREATION OF SIGMOID COLOSTOMY AND EXTENSIVE  LYSIS OF ADHESIONS; Surgeon:MONTSERRAT BENDER; Location: OR    COLOSTOMY      CV ANGIOGRAM RENAL BILATERAL Bilateral 11/17/2023    Procedure: Angiogram Renal Bilateral;  Surgeon: Ankit Bhatia MD;  Location:  [facility]    CV CORONARY ANGIOGRAM N/A 11/17/2023    Procedure: Coronary Angiogram;  Surgeon: Ankit Bhatia MD;  Location:  [facility]    CYSTOSCOPY      CYSTOSCOPY, INJECT BOTOX N/A 09/18/2023    Procedure: CYSTOSCOPY, WITH BOTULINUM TOXIN INJECTION;  Surgeon: Mishel Zendejas MD;  [place] [facility] [place]    CYSTOSCOPY, INJECT BOTOX N/A 2/20/2024    Procedure: CYSTOSCOPY, WITH BOTULINUM TOXIN INJECTION;  Surgeon: Mishel Zendejas MD;  [place] [facility] [place]    CYSTOSCOPY, INJECT BOTOX N/A 11/4/2024    Procedure: INTRAVESICAL BOTULINUM TOXIN INJECTION;  Surgeon: Mishel Zendejas MD;  [place] [facility] [place]    CYSTOSCOPY, TRANSURETHRAL RESECTION (TUR) TUMOR BLADDER, COMBINED N/A 11/4/2024    Procedure: EXAM UNDER ANESTHESIA, PELVIS, WITH CYSTOSCOPY;  Surgeon: Mishel Zendejas MD;  [place] [facility] [place]    EYE SURGERY      GENITOURINARY  SURGERY  1999    GI SURGERY      weakened rectal sphincter with artificial stimulator    HERNIA REPAIR  1976    HYSTERECTOMY TOTAL ABDOMINAL      LAPAROTOMY, LYSIS ADHESIONS, COMBINED  02/07/2012    Procedure:COMBINED LAPAROTOMY, LYSIS ADHESIONS; Surgeon:MONTSERRAT BENDER; Location:[facility] OR    RELEASE CARPAL TUNNEL      RELEASE TENDON FOOT  03/15/2012    Procedure:RELEASE TENDON FOOT; Surgeon:SUKHDEEP METZ; Location: OR    REMOVE HARDWARE FOOT  12/13/2012    Procedure: REMOVE HARDWARE FOOT;  RIGHT FOOT REMOVAL OF HARDWARE;  Surgeon: Sukhdeep Metz MD;  Location: [facility] OR    SHOULDER SURGERY  11/12/2020    LEFT SHOULDER HEMIARHTROPLASTY, BICEP TENODESIS    SOFT TISSUE SURGERY  2018, 2020    TENDON RELEASE      foot    THYROIDECTOMY      ZZC FREEING BOWEL ADHESION,ENTEROLYSIS      1986, 1996, 1999    ZZC NONSPECIFIC PROCEDURE      throidectomy    ZZC TOTAL ABDOM HYSTERECTOMY  1980    + BSO      Allergies   Allergen Reactions    Ketorolac Tromethamine Difficulty breathing     Shortness of breath to tablets only per the patient    Nsaids Difficulty breathing     Increased creatinine    Celecoxib Itching and Rash    Morphine And Codeine Itching     With higher doses. Pt denies this allergy 11/16/2023    Codeine Itching    Conjugated Estrogens     Crestor [Rosuvastatin] Muscle Pain (Myalgia)    No Clinical Screening - See Comments Itching     Fragrance    Vioxx Other (See Comments)     Heart races    Sulfa Antibiotics Rash      Social History     Tobacco Use    Smoking status: Never     Passive exposure: Never    Smokeless tobacco: Never   Substance Use Topics    Alcohol use: Never      Wt Readings from Last 1 Encounters:   01/25/25 96.4 kg (212 lb 8.4 oz)        Anesthesia Evaluation   Pt has had prior anesthetic. Type: General.    No history of anesthetic complications       ROS/MED HX  ENT/Pulmonary:     (+) sleep apnea, doesn't use CPAP,                                   (-) asthma, COPD and recent  URI   Neurologic: Comment: RLE weakness 2/2 back issues    Hx polio and RLE paralysis as a child      Cardiovascular:     (+) Dyslipidemia hypertension- Peripheral Vascular Disease-- Carotid Stenosis.  CAD (mild, non-obstructing) -  - -                                 Previous cardiac testing   Echo: Date: 1/2025 Results:  Left ventricular systolic function is normal.The visual ejection fraction is  60-65%.  The right ventricular systolic function is normal.  Mild valvular aortic stenosis-peak aortic valve velocity 2.7 m/s, mean  gradients 15 mmHg, aortic valve area 1.8 cmÂ   IVC diameter <2.1 cm collapsing >50% with sniff suggests a normal RA pressure  of 3 mmHg.    Stress Test:  Date: Results:    ECG Reviewed:  Date: Results:    Cath:  Date: Results:   (-) irregular heartbeat/palpitations   METS/Exercise Tolerance:     Hematologic:     (+) History of blood clots,    pt is not anticoagulated,           Musculoskeletal: Comment: Mechanical fall with periprosthetic humerus fracture  Ambulatory with walker      GI/Hepatic:     (+) GERD, Asymptomatic on medication,                  Renal/Genitourinary:     (+) renal disease, type: CRI,            Endo:     (+)  type II DM,        thyroid problem, hypothyroidism,    Obesity,       Psychiatric/Substance Use:       Infectious Disease:       Malignancy:       Other:            Physical Exam    Airway        Mallampati: III   TM distance: > 3 FB   Neck ROM: full   Mouth opening: > 3 cm    Respiratory Devices and Support         Dental       (+) Modest Abnormalities - crowns, retainers, 1 or 2 missing teeth      Cardiovascular   cardiovascular exam normal          Pulmonary   pulmonary exam normal                OUTSIDE LABS:  CBC:   Lab Results   Component Value Date    WBC 8.9 01/25/2025    WBC 10.1 01/25/2025    HGB 12.1 01/25/2025    HGB 11.3 (L) 01/25/2025    HCT 37.2 01/25/2025    HCT 34.3 (L) 01/25/2025     01/25/2025    PLT 84 (L) 01/25/2025     BMP:   Lab  Results   Component Value Date     01/25/2025     01/25/2025    POTASSIUM 4.7 01/25/2025    POTASSIUM 4.9 01/25/2025    CHLORIDE 107 01/25/2025    CHLORIDE 103 01/25/2025    CO2 22 01/25/2025    CO2 22 01/25/2025    BUN 27.6 (H) 01/25/2025    BUN 28.6 (H) 01/25/2025    CR 1.13 (H) 01/25/2025    CR 1.36 (H) 01/25/2025     (H) 01/26/2025     (H) 01/26/2025     COAGS:   Lab Results   Component Value Date    PTT 27 02/23/2018    INR 0.98 02/23/2018     POC:   Lab Results   Component Value Date     (H) 08/05/2019     HEPATIC:   Lab Results   Component Value Date    ALBUMIN 3.6 01/14/2025    PROTTOTAL 6.6 09/11/2024    ALT 16 09/11/2024    AST 24 09/11/2024    ALKPHOS 52 09/11/2024    BILITOTAL 0.3 09/11/2024     OTHER:   Lab Results   Component Value Date    A1C 7.6 (H) 10/07/2024    RINKU 9.2 01/25/2025    PHOS 3.8 03/09/2023    MAG 2.2 08/31/2023    LIPASE 15 11/20/2020    TSH 0.42 10/07/2024    T4 1.80 (H) 03/28/2024    CRP 7.2 08/01/2018    SED 17 01/20/2023       Anesthesia Plan    ASA Status:  3    NPO Status:  NPO Appropriate    Anesthesia Type: General.     - Airway: ETT   Induction: Intravenous.   Maintenance: Balanced.        Consents    Anesthesia Plan(s) and associated risks, benefits, and realistic alternatives discussed. Questions answered and patient/representative(s) expressed understanding.     - Discussed:     - Discussed with:  Patient      - Extended Intubation/Ventilatory Support Discussed: No.      - Patient is DNR/DNI Status: No     Use of blood products discussed: Yes.     - Discussed with: Patient.     - Consented: consented to blood products     Postoperative Care    Pain management: IV analgesics, Oral pain medications, Multi-modal analgesia, Peripheral nerve block (Single Shot) (post-op block PRN).   PONV prophylaxis: Ondansetron (or other 5HT-3), Dexamethasone or Solumedrol     Comments:    Other Comments: The surgeon has given a verbal order transferring care  "of this patient to me for the performance of a regional analgesia block for either post-op pain control or primary anesthetic. It is requested of me because I am uniquely trained and qualified to perform this block and the surgeon is neither trained nor qualified to perform this procedure.    Ultrasound guided peripheral nerve block(s) discussed. The patient was informed that undergoing the block is not required for surgery to proceed and is optional, but they can be beneficial in reducing post operative pain and pain medication requirements for a period of time (several hours to a few days). Block rationale, procedure, expected results, and block specific side effects reviewed with the patient. Risks, including but not limited to bleeding, infection, nerve damage, damage to surrounding structures, medication adverse/allergic reactions, and block failure discussed. Other anesthetic/pain control options discussed.  Questions encouraged and answered.  The patient wishes to proceed.                Jacquie Carter MD    I have reviewed the pertinent notes and labs in the chart from the past 30 days and (re)examined the patient.  Any updates or changes from those notes are reflected in this note.    Clinically Significant Risk Factors Present on Admission                 # Drug Induced Platelet Defect: home medication list includes an antiplatelet medication   # Hypertension: Noted on problem list          # DMII: A1C = N/A within past 6 months   # Obesity: Estimated body mass index is 37.65 kg/m  as calculated from the following:    Height as of this encounter: 1.6 m (5' 3\").    Weight as of this encounter: 96.4 kg (212 lb 8.4 oz).       # Financial/Environmental Concerns:             "

## 2025-01-27 ENCOUNTER — APPOINTMENT (OUTPATIENT)
Dept: OCCUPATIONAL THERAPY | Facility: CLINIC | Age: 80
DRG: 493 | End: 2025-01-27
Attending: STUDENT IN AN ORGANIZED HEALTH CARE EDUCATION/TRAINING PROGRAM
Payer: MEDICARE

## 2025-01-27 ENCOUNTER — PATIENT OUTREACH (OUTPATIENT)
Dept: CARE COORDINATION | Facility: CLINIC | Age: 80
End: 2025-01-27
Payer: MEDICARE

## 2025-01-27 PROBLEM — M54.50 BILATERAL LOW BACK PAIN WITHOUT SCIATICA, UNSPECIFIED CHRONICITY: Status: ACTIVE | Noted: 2025-01-27

## 2025-01-27 LAB
ANION GAP SERPL CALCULATED.3IONS-SCNC: 8 MMOL/L (ref 7–15)
BASOPHILS # BLD AUTO: 0 10E3/UL (ref 0–0.2)
BASOPHILS NFR BLD AUTO: 0 %
BUN SERPL-MCNC: 32.6 MG/DL (ref 8–23)
CALCIUM SERPL-MCNC: 8.2 MG/DL (ref 8.8–10.4)
CHLORIDE SERPL-SCNC: 104 MMOL/L (ref 98–107)
CREAT SERPL-MCNC: 1.37 MG/DL (ref 0.51–0.95)
EGFRCR SERPLBLD CKD-EPI 2021: 39 ML/MIN/1.73M2
EOSINOPHIL # BLD AUTO: 0.1 10E3/UL (ref 0–0.7)
EOSINOPHIL NFR BLD AUTO: 1 %
ERYTHROCYTE [DISTWIDTH] IN BLOOD BY AUTOMATED COUNT: 12.6 % (ref 10–15)
GLUCOSE BLDC GLUCOMTR-MCNC: 126 MG/DL (ref 70–99)
GLUCOSE BLDC GLUCOMTR-MCNC: 183 MG/DL (ref 70–99)
GLUCOSE BLDC GLUCOMTR-MCNC: 204 MG/DL (ref 70–99)
GLUCOSE BLDC GLUCOMTR-MCNC: 208 MG/DL (ref 70–99)
GLUCOSE BLDC GLUCOMTR-MCNC: 252 MG/DL (ref 70–99)
GLUCOSE SERPL-MCNC: 151 MG/DL (ref 70–99)
HCO3 SERPL-SCNC: 23 MMOL/L (ref 22–29)
HCT VFR BLD AUTO: 26.8 % (ref 35–47)
HGB BLD-MCNC: 8.6 G/DL (ref 11.7–15.7)
IMM GRANULOCYTES # BLD: 0.1 10E3/UL
IMM GRANULOCYTES NFR BLD: 1 %
LYMPHOCYTES # BLD AUTO: 2.4 10E3/UL (ref 0.8–5.3)
LYMPHOCYTES NFR BLD AUTO: 23 %
MCH RBC QN AUTO: 30.2 PG (ref 26.5–33)
MCHC RBC AUTO-ENTMCNC: 32.1 G/DL (ref 31.5–36.5)
MCV RBC AUTO: 94 FL (ref 78–100)
MONOCYTES # BLD AUTO: 1.2 10E3/UL (ref 0–1.3)
MONOCYTES NFR BLD AUTO: 11 %
NEUTROPHILS # BLD AUTO: 6.6 10E3/UL (ref 1.6–8.3)
NEUTROPHILS NFR BLD AUTO: 64 %
NRBC # BLD AUTO: 0 10E3/UL
NRBC BLD AUTO-RTO: 0 /100
PLATELET # BLD AUTO: 167 10E3/UL (ref 150–450)
POTASSIUM SERPL-SCNC: 4.9 MMOL/L (ref 3.4–5.3)
RBC # BLD AUTO: 2.85 10E6/UL (ref 3.8–5.2)
SODIUM SERPL-SCNC: 135 MMOL/L (ref 135–145)
WBC # BLD AUTO: 10.3 10E3/UL (ref 4–11)

## 2025-01-27 PROCEDURE — 99232 SBSQ HOSP IP/OBS MODERATE 35: CPT | Performed by: HOSPITALIST

## 2025-01-27 PROCEDURE — 80048 BASIC METABOLIC PNL TOTAL CA: CPT | Performed by: HOSPITALIST

## 2025-01-27 PROCEDURE — 250N000013 HC RX MED GY IP 250 OP 250 PS 637: Performed by: STUDENT IN AN ORGANIZED HEALTH CARE EDUCATION/TRAINING PROGRAM

## 2025-01-27 PROCEDURE — 250N000011 HC RX IP 250 OP 636: Mod: JZ | Performed by: STUDENT IN AN ORGANIZED HEALTH CARE EDUCATION/TRAINING PROGRAM

## 2025-01-27 PROCEDURE — 250N000013 HC RX MED GY IP 250 OP 250 PS 637: Performed by: HOSPITALIST

## 2025-01-27 PROCEDURE — 120N000001 HC R&B MED SURG/OB

## 2025-01-27 PROCEDURE — 97530 THERAPEUTIC ACTIVITIES: CPT | Mod: GO | Performed by: OCCUPATIONAL THERAPIST

## 2025-01-27 PROCEDURE — G0378 HOSPITAL OBSERVATION PER HR: HCPCS

## 2025-01-27 PROCEDURE — 250N000012 HC RX MED GY IP 250 OP 636 PS 637: Performed by: HOSPITALIST

## 2025-01-27 PROCEDURE — 97165 OT EVAL LOW COMPLEX 30 MIN: CPT | Mod: GO | Performed by: OCCUPATIONAL THERAPIST

## 2025-01-27 PROCEDURE — 96376 TX/PRO/DX INJ SAME DRUG ADON: CPT

## 2025-01-27 PROCEDURE — 82962 GLUCOSE BLOOD TEST: CPT

## 2025-01-27 PROCEDURE — 36415 COLL VENOUS BLD VENIPUNCTURE: CPT | Performed by: HOSPITALIST

## 2025-01-27 PROCEDURE — 85025 COMPLETE CBC W/AUTO DIFF WBC: CPT | Performed by: HOSPITALIST

## 2025-01-27 RX ADMIN — CARVEDILOL 12.5 MG: 6.25 TABLET, FILM COATED ORAL at 08:02

## 2025-01-27 RX ADMIN — EZETIMIBE 10 MG: 10 TABLET ORAL at 20:31

## 2025-01-27 RX ADMIN — OXYCODONE HYDROCHLORIDE 5 MG: 5 TABLET ORAL at 17:50

## 2025-01-27 RX ADMIN — FENOFIBRATE 134 MG: 160 TABLET, FILM COATED ORAL at 22:05

## 2025-01-27 RX ADMIN — CARVEDILOL 12.5 MG: 6.25 TABLET, FILM COATED ORAL at 17:50

## 2025-01-27 RX ADMIN — METHOCARBAMOL 1000 MG: 500 TABLET ORAL at 00:46

## 2025-01-27 RX ADMIN — PANTOPRAZOLE SODIUM 40 MG: 40 TABLET, DELAYED RELEASE ORAL at 20:31

## 2025-01-27 RX ADMIN — FAMOTIDINE 20 MG: 20 TABLET, FILM COATED ORAL at 08:02

## 2025-01-27 RX ADMIN — PANTOPRAZOLE SODIUM 40 MG: 40 TABLET, DELAYED RELEASE ORAL at 08:02

## 2025-01-27 RX ADMIN — ACETAMINOPHEN 975 MG: 325 TABLET, FILM COATED ORAL at 04:44

## 2025-01-27 RX ADMIN — LEVOTHYROXINE SODIUM 112 MCG: 0.11 TABLET ORAL at 08:02

## 2025-01-27 RX ADMIN — HYDROMORPHONE HYDROCHLORIDE 0.2 MG: 0.2 INJECTION, SOLUTION INTRAMUSCULAR; INTRAVENOUS; SUBCUTANEOUS at 09:49

## 2025-01-27 RX ADMIN — OXYCODONE HYDROCHLORIDE 5 MG: 5 TABLET ORAL at 22:05

## 2025-01-27 RX ADMIN — INSULIN GLARGINE 10 UNITS: 100 INJECTION, SOLUTION SUBCUTANEOUS at 11:47

## 2025-01-27 RX ADMIN — ASPIRIN 81 MG: 81 TABLET, COATED ORAL at 08:02

## 2025-01-27 RX ADMIN — ACETAMINOPHEN 975 MG: 325 TABLET, FILM COATED ORAL at 20:31

## 2025-01-27 RX ADMIN — SENNOSIDES AND DOCUSATE SODIUM 1 TABLET: 50; 8.6 TABLET ORAL at 20:31

## 2025-01-27 RX ADMIN — OXYCODONE HYDROCHLORIDE 5 MG: 5 TABLET ORAL at 04:48

## 2025-01-27 RX ADMIN — ACETAMINOPHEN 975 MG: 325 TABLET, FILM COATED ORAL at 11:46

## 2025-01-27 RX ADMIN — SENNOSIDES AND DOCUSATE SODIUM 1 TABLET: 50; 8.6 TABLET ORAL at 08:02

## 2025-01-27 RX ADMIN — HYDROMORPHONE HYDROCHLORIDE 0.2 MG: 0.2 INJECTION, SOLUTION INTRAMUSCULAR; INTRAVENOUS; SUBCUTANEOUS at 00:46

## 2025-01-27 RX ADMIN — EMPAGLIFLOZIN 10 MG: 10 TABLET, FILM COATED ORAL at 20:31

## 2025-01-27 RX ADMIN — CEFAZOLIN SODIUM 2 G: 2 INJECTION, SOLUTION INTRAVENOUS at 00:37

## 2025-01-27 RX ADMIN — LOSARTAN POTASSIUM 100 MG: 50 TABLET, FILM COATED ORAL at 08:02

## 2025-01-27 RX ADMIN — OXYCODONE HYDROCHLORIDE 5 MG: 5 TABLET ORAL at 11:47

## 2025-01-27 ASSESSMENT — ACTIVITIES OF DAILY LIVING (ADL)
ADLS_ACUITY_SCORE: 69
ADLS_ACUITY_SCORE: 71
ADLS_ACUITY_SCORE: 69
ADLS_ACUITY_SCORE: 71
ADLS_ACUITY_SCORE: 69
ADLS_ACUITY_SCORE: 69
IADL_COMMENTS: PT REPORTS INDEPENDENCE WITH IADLS AT BASELINE
ADLS_ACUITY_SCORE: 69
ADLS_ACUITY_SCORE: 71
ADLS_ACUITY_SCORE: 69
ADLS_ACUITY_SCORE: 71
ADLS_ACUITY_SCORE: 71
ADLS_ACUITY_SCORE: 69
PREVIOUS_RESPONSIBILITIES: MEAL PREP;HOUSEKEEPING;LAUNDRY;SHOPPING;MEDICATION MANAGEMENT;FINANCES;DRIVING

## 2025-01-27 NOTE — PLAN OF CARE
"PRIMARY DIAGNOSIS: Left humeral fracture, status post open reduction internal fixation, Fall  OUTPATIENT/OBSERVATION GOALS TO BE MET BEFORE DISCHARGE:  1. Pain Status: Improved but still requiring IV narcotics.    2. Return to near baseline physical activity: No    3. Cleared for discharge by consultants (if involved): No    Discharge Planner Nurse   Safe discharge environment identified: Yes  Barriers to discharge: Yes  A & O X 4 with forgetfulness. No S/SX of respiratory distress. Lung sounds diminished. Denies chills, shortness of breath, dizziness, nausea, vomit, or diarrhea. Bowel sounds present and active. Bowel movement reported this shift.Colostomy patent with good output . Passing flatus. Pure wick in place with adequate output.  A X 2 with cares. On contact isolation for MRSA. Redness/discoloration noted to lower extremities. PIV saline infusing LR at 10 mL. On mod CHO diet.Surgical dressing CDI.  Left arm in a sling. CMS intact. PCDs on both bilaterally. On  capno. Denies numbness, or tingling. Scheduled Tylenol, prn Oxycodone, Dilaudid, and Robaxin available for pain management. C/O pain rating at 7, prn Dilaudid given X 1-See MAR. Patient reported relief after prn pain medications given. On blood glucose monitoring and sliding scale for DM 2 management. Afebrile. Orthopedic/PT/OT/CM/SW following.   Will continue to provide supportive cares.   BP (!) 145/56 (BP Location: Right arm)   Pulse 62   Temp 98.5  F (36.9  C) (Oral)   Resp 15   Ht 1.6 m (5' 3\")   Wt 96.4 kg (212 lb 8.4 oz)   LMP  (LMP Unknown)   SpO2 96%   BMI 37.65 kg/m          Entered by: Radha Avery RN 01/27/2025 2:23 AM     Problem: Adult Inpatient Plan of Care  Goal: Plan of Care Review  Description: The Plan of Care Review/Shift note should be completed every shift.  The Outcome Evaluation is a brief statement about your assessment that the patient is improving, declining, or no change.  This information will be displayed " "automatically on your shift  note.  Outcome: Progressing  Flowsheets (Taken 1/27/2025 0222)  Plan of Care Reviewed With: patient  Goal: Patient-Specific Goal (Individualized)  Description: You can add care plan individualizations to a care plan. Examples of Individualization might be:  \"Parent requests to be called daily at 9am for status\", \"I have a hard time hearing out of my right ear\", or \"Do not touch me to wake me up as it startles  me\".  Outcome: Progressing  Goal: Absence of Hospital-Acquired Illness or Injury  Outcome: Progressing  Intervention: Identify and Manage Fall Risk  Recent Flowsheet Documentation  Taken 1/26/2025 2053 by Radha Avery RN  Safety Promotion/Fall Prevention:   activity supervised   assistive device/personal items within reach   clutter free environment maintained   nonskid shoes/slippers when out of bed   patient and family education   room near nurse's station   room organization consistent   safety round/check completed  Intervention: Prevent Skin Injury  Recent Flowsheet Documentation  Taken 1/26/2025 2053 by Radha Avery RN  Body Position:   right   turned  Skin Protection:   adhesive use limited   tubing/devices free from skin contact  Intervention: Prevent and Manage VTE (Venous Thromboembolism) Risk  Recent Flowsheet Documentation  Taken 1/26/2025 2053 by Radha Avery RN  VTE Prevention/Management: SCDs on (sequential compression devices)  Intervention: Prevent Infection  Recent Flowsheet Documentation  Taken 1/26/2025 2053 by Radha Avery RN  Infection Prevention:   single patient room provided   hand hygiene promoted  Goal: Optimal Comfort and Wellbeing  Outcome: Progressing  Intervention: Monitor Pain and Promote Comfort  Recent Flowsheet Documentation  Taken 1/26/2025 2050 by Radha Avery RN  Pain Management Interventions: medication (see MAR)  Goal: Readiness for Transition of Care  Outcome: Progressing     Problem: Skin Injury Risk " Increased  Goal: Skin Health and Integrity  Outcome: Progressing  Intervention: Plan: Nurse Driven Intervention: Moisture Management  Recent Flowsheet Documentation  Taken 1/26/2025 2053 by Radha Avery RN  Moisture Interventions:   Encourage regular toileting   Incontinence pad  Intervention: Plan: Nurse Driven Intervention: Friction and Shear  Recent Flowsheet Documentation  Taken 1/26/2025 2053 by Radha Avery RN  Friction/Shear Interventions: HOB 30 degrees or less  Intervention: Optimize Skin Protection  Recent Flowsheet Documentation  Taken 1/26/2025 2053 by Radha Avery RN  Pressure Reduction Techniques:   weight shift assistance provided   pressure points protected  Skin Protection:   adhesive use limited   tubing/devices free from skin contact  Activity Management: activity adjusted per tolerance  Head of Bed (HOB) Positioning: HOB at 20-30 degrees     Problem: Comorbidity Management  Goal: Blood Glucose Levels Within Targeted Range  Outcome: Progressing  Intervention: Monitor and Manage Glycemia  Recent Flowsheet Documentation  Taken 1/26/2025 2053 by Radha Avery RN  Medication Review/Management: medications reviewed  Goal: Blood Pressure in Desired Range  Outcome: Progressing  Intervention: Maintain Blood Pressure Management  Recent Flowsheet Documentation  Taken 1/26/2025 2053 by Radha Avery RN  Medication Review/Management: medications reviewed     Please review provider order for any additional goals.   Nurse to notify provider when observation goals have been met and patient is ready for discharge.      Plan of Care Reviewed With: patient

## 2025-01-27 NOTE — PROGRESS NOTES
01/27/25 1100   Appointment Info   Signing Clinician's Name / Credentials (OT) Montserrat Adamson OTRL   Living Environment   People in Home alone   Current Living Arrangements condominium   Home Accessibility stairs to enter home;stairs within home   Number of Stairs, Main Entrance 2   Stair Railings, Main Entrance none   Number of Stairs, Within Home, Primary one   Stair Railings, Within Home, Primary none   Living Environment Comments Pt reports she lives in a condo with two steps to enter, then all needs met on main level with one step into laundry room. Pt has walkers on each level of home   Self-Care   Usual Activity Tolerance moderate   Current Activity Tolerance poor   Regular Exercise No   Equipment Currently Used at Home walker, rolling;wheelchair, manual;tub bench;colostomy/ostomy supplies;cane, quad;cane, straight;walker, standard   Fall history within last six months yes   Number of times patient has fallen within last six months 1  (fall resulting in humeral fracture)   Activity/Exercise/Self-Care Comment Pt reports mod I with walkers and canes at baseline   Instrumental Activities of Daily Living (IADL)   Previous Responsibilities meal prep;housekeeping;laundry;shopping;medication management;finances;driving   IADL Comments Pt reports independence with IADLs at baseline   General Information   Onset of Illness/Injury or Date of Surgery 01/25/25   Referring Physician Erich Myrick MD   Patient/Family Therapy Goal Statement (OT) Patient would like to decrease pain   Additional Occupational Profile Info/Pertinent History of Current Problem 79 year old female with a past medical history of nonocclusive CAD, hyperlipidemia, hypertension, PAD, diabetes mellitus, hyperlipidemia, hypothyroidism, history of DVT not on blood thinners, ANNETTE who presents with fall, arm pain and periprosthetic left humeral fracture     POD #0 ORIF left proximal humerus  Mechanical fall complicated by left periprosthetic  humeral fracture   Existing Precautions/Restrictions fall;weight bearing;shoulder   Left Upper Extremity (Weight-bearing Status) non weight-bearing (NWB)   General Observations and Info Elbow, wrist, and hand ROM with Codmans   Cognitive Status Examination   Orientation Status orientation to person, place and time   Affect/Mental Status (Cognitive) WFL   Follows Commands WFL   Cognitive Status Comments Pt appears at baseline   Visual Perception   Visual Impairment/Limitations corrective lenses full-time   Sensory   Sensory Comments Pt reports intact   Pain Assessment   Patient Currently in Pain Yes, see Vital Sign flowsheet   Posture   Posture forward head position;protracted shoulders   Range of Motion Comprehensive   Comment, General Range of Motion LUE limited due to pain, Shoulder precautions   Strength Comprehensive (MMT)   Comment, General Manual Muscle Testing (MMT) Assessment LUE NWB, global weakness   Coordination   Coordination Comments Impaired due to LUE restrictions   Bed Mobility   Comment (Bed Mobility) Max assist   Transfers   Transfer Comments Dependent via lift   Balance   Balance Comments Impaired seated EOB   Activities of Daily Living   BADL Assessment/Intervention bathing;upper body dressing;lower body dressing;grooming;toileting   Bathing Assessment/Intervention   Comment, (Bathing) Dependent per clinical reasoning   Upper Body Dressing Assessment/Training   Comment, (Upper Body Dressing) Max assist   Lower Body Dressing Assessment/Training   Comment, (Lower Body Dressing) Dependent   Grooming Assessment/Training   Comment, (Grooming) Impaired tolerance for activity in sitting, unable to complete in standing   Toileting   Comment, (Toileting) Dependent per clinical reasoning   Clinical Impression   Criteria for Skilled Therapeutic Interventions Met (OT) Yes, treatment indicated   OT Diagnosis Decline in ADL independence and safety   Influenced by the following impairments Impaired dressing  bathing toileting and grooming   OT Problem List-Impairments impacting ADL problems related to;activity tolerance impaired;balance;coordination;mobility;range of motion (ROM);strength;pain;post-surgical precautions   Assessment of Occupational Performance 5 or more Performance Deficits   Identified Performance Deficits Impaired dressing bathing toielting grooming and ROM   Planned Therapy Interventions (OT) ADL retraining;ROM;strengthening;home program guidelines;progressive activity/exercise   Clinical Decision Making Complexity (OT) detailed assessment/moderate complexity   Risk & Benefits of therapy have been explained evaluation/treatment results reviewed;care plan/treatment goals reviewed;current/potential barriers reviewed;risks/benefits reviewed;participants voiced agreement with care plan;participants included;patient   OT Total Evaluation Time   OT Eval, Low Complexity Minutes (67500) 10   OT Goals   Therapy Frequency (OT) 5 times/week   OT Predicted Duration/Target Date for Goal Attainment 02/05/25   OT Goals Hygiene/Grooming;Upper Body Dressing;Lower Body Dressing;Toilet Transfer/Toileting   OT: Hygiene/Grooming modified independent;while standing   OT: Upper Body Dressing Modified independent;using adaptive equipment;within precautions   OT: Lower Body Dressing Modified independent;using adaptive equipment;within precautions   OT: Toilet Transfer/Toileting Modified independent;toilet transfer;cleaning and garment management;using adaptive equipment;within precautions   Therapeutic Activities   Therapeutic Activity Minutes (31047) 23   Symptoms noted during/after treatment increased pain   Treatment Detail/Skilled Intervention Ot ;Pt agreeable to therapy. Pt required min assist to mobilize BLE to EOB, max assist for trunk support during supine to sit. Pt able to balance EOB with SBA. Cues for UE precautions. Pt required max assist to adjust sling seated EOB. Attempt to stand with pt reporting pain and  dizziness, pt pale. MAx assist sit to supine. BP assessed, WFL by the time assessed with pt in bed. Pt reporting significant pain, attempted to guide pt through UE execises in bed, pt began to cry due to pain, exercises ceased. Dependent to adjust in bed for skin and jpint integirty.   OT Discharge Planning   OT Plan UE exercises, donning/doffing sling. Sit to stand with neha walker/quad cane, toilet transfer if able   OT Discharge Recommendation (DC Rec) Transitional Care Facility   OT Rationale for DC Rec Patient presents significantly below baseline with ADLS and mobility. Pt will require TCU at [facility] to return to prior level. OT will follow   OT Brief overview of current status Unable to attempt standing this date due to dizziness and pain   OT Total Distance Amb During Session (feet) 0

## 2025-01-27 NOTE — CONSULTS
Care Management Note    Discharge Date: 01/28/2025      Additional Information:  CM/SW continues to follow for discharge planning.     Caro Paz RN, BSN  Inpatient Care Coordination  [facility]  [phone]     Abnormal Lactate: > 2

## 2025-01-27 NOTE — PROGRESS NOTES
PROGRESS NOTE    SUBJECTIVE  Chasity Hodgson is status post ORIF left periprosthetic humerus fracture on 1/26/25 . No acute events overnight. Afebrile with stable vitals. Patient reports pain is well controlled on oral medication. Patient is tolerating a general diet. Patient denies any numbness/tingling in the operative extremity, chest pain or shortness of breath.     PHYSICAL EXAM  General: No acute distress. Alert and oriented to person, time and place.  Pulmonary: Breathing comfortably on room air.   Cardiac: Noncyanotic  left upper extremity:  Dressings clean, dry and intact.   Wiggles fingers, thumbs up, OK sign, crosses fingers, flexes and extends elbow 5/5, shoulder abduction intact  Palpable radial pulse  SILT to C5-T1 distributions    LABS  Hgb 8.6    IMAGING  Post-operative radiographs were reviewed and demonstrate components in good position. No evidence of fracture or dislocation.     ASSESSMENT/PLAN  #1 Status post ORIF periprosthetic humerus fracture on 1/26/25    Chasity Hodgson is status post the above procedure.     Activity: Non weight bearing. Remain in shoulder sling at all times, OK to remove for hygiene and skin assessment. OK to loosen to exercise elbow, wrist and fingers. Continue to work with PT while in hospital.   Antibiotics: Weight-based Ancef x2 doses postoperatively or until discharge.  Cultures: None  Diet: Advance as tolerated. Goal diet: General.   Dressing: Patient is able to shower on POD #3 after removing the outer dressing. The zipline should remain in place. Patient will keep that dressing in place until follow up.  DVT Prophylaxis: Aspirin 81mg daily for 3 weeks starting POD #1  Pain medication: Multimodal pain regimen with ice, elevation, Tylenol 1000mg Q8H scheduled, Ibuprofen 400-600 TID, Oxycodone 5-10mg Q6 PRN    Disposition: Stable and improving. Discharge pending pain control, mobilization with therapy, discharge planning

## 2025-01-27 NOTE — CONSULTS
Care Management Initial Consult    General Information  Assessment completed with: Patient,    Type of CM/SW Visit: Initial Assessment    Primary Care Provider verified and updated as needed:     Readmission within the last 30 days: no previous admission in last 30 days      Reason for Consult: discharge planning  Advance Care Planning:            Communication Assessment  Patient's communication style: spoken language (English or Bilingual)    Hearing Difficulty or Deaf: no   Wear Glasses or Blind: yes    Cognitive  Cognitive/Neuro/Behavioral: .WDL except, arousability  Level of Consciousness: lethargic  Arousal Level: opens eyes spontaneously  Orientation: oriented x 4     Best Language: 0 - No aphasia  Speech: clear, spontaneous    Living Environment:   People in home: alone     Current living Arrangements: house      Able to return to prior arrangements: yes  Living Arrangement Comments: May need TCU for increased mobility prior to discharge    Family/Social Support:  Care provided by: self  Provides care for: no one  Marital Status:   Support system: Children          Description of Support System: Supportive, Involved    Support Assessment: Adequate family and caregiver support    Current Resources:   Patient receiving home care services: No        Community Resources: None  Equipment currently used at home: walker, rolling, wheelchair, manual, tub bench, colostomy/ostomy supplies  Supplies currently used at home: Diabetic Supplies, Compression Stockings, Wound Care Supplies    Employment/Financial:  Employment Status: retired        Financial Concerns:             Does the patient's insurance plan have a 3 day qualifying hospital stay waiver?  Yes     Which insurance plan 3 day waiver is available? ACO REACH    Will the waiver be used for post-acute placement? Yes    Lifestyle & Psychosocial Needs:  Social Drivers of Health     Food Insecurity: Low Risk  (1/26/2025)    Food Insecurity     Within the    LIZETT RIVERA  MRN-[id number]    Patient is a 71y old  Male who presents with a chief complaint of hodgkins lymphoma (24 Jul 2021 10:41)      Review of System    Comfortable    Current Meds  MEDICATIONS  (STANDING):  allopurinol 300 milliGRAM(s) Oral daily  aspirin  chewable 81 milliGRAM(s) Oral daily  dexAMETHasone     Tablet 4 milliGRAM(s) Oral every 8 hours  dextrose 40% Gel 15 Gram(s) Oral once  dextrose 5%. 1000 milliLiter(s) (50 mL/Hr) IV Continuous <Continuous>  dextrose 5%. 1000 milliLiter(s) (100 mL/Hr) IV Continuous <Continuous>  dextrose 50% Injectable 25 Gram(s) IV Push once  dextrose 50% Injectable 12.5 Gram(s) IV Push once  dextrose 50% Injectable 25 Gram(s) IV Push once  finasteride 5 milliGRAM(s) Oral daily  glucagon  Injectable 1 milliGRAM(s) IntraMuscular once  hemorrhoidal Ointment 1 Application(s) Rectal two times a day  insulin glargine Injectable (LANTUS) 60 Unit(s) SubCutaneous at bedtime  insulin lispro (ADMELOG) corrective regimen sliding scale   SubCutaneous three times a day before meals  insulin lispro (ADMELOG) corrective regimen sliding scale   SubCutaneous at bedtime  insulin lispro Injectable (ADMELOG) 27 Unit(s) SubCutaneous three times a day before meals  melatonin 5 milliGRAM(s) Oral at bedtime  pantoprazole   Suspension 40 milliGRAM(s) Oral daily  QUEtiapine 25 milliGRAM(s) Oral at bedtime  senna 2 Tablet(s) Oral at bedtime  simethicone 80 milliGRAM(s) Chew three times a day  tamsulosin 0.4 milliGRAM(s) Oral at bedtime    MEDICATIONS  (PRN):  acetaminophen   Tablet .. 650 milliGRAM(s) Oral every 6 hours PRN Temp greater or equal to 38C (100.4F), Moderate Pain (4 - 6)  bisacodyl 5 milliGRAM(s) Oral every 12 hours PRN Constipation  polyethylene glycol 3350 17 Gram(s) Oral daily PRN Constipation      Vitals  Vital Signs Last 24 Hrs  T(C): 36.7 (27 Jul 2021 10:45), Max: 37 (27 Jul 2021 04:44)  T(F): 98.1 (27 Jul 2021 10:45), Max: 98.6 (27 Jul 2021 04:44)  HR: 92 (27 Jul 2021 10:45) (72 - 105)  BP: 122/74 (27 Jul 2021 10:45) (113/67 - 125/81)  BP(mean): --  RR: 18 (27 Jul 2021 10:45) (18 - 18)  SpO2: 99% (27 Jul 2021 10:45) (99% - 99%)    Physical Exam    NAD    Lab  CBC Full  -  ( 27 Jul 2021 06:36 )  WBC Count : 3.98 K/uL  RBC Count : 3.21 M/uL  Hemoglobin : 10.4 g/dL  Hematocrit : 32.6 %  Platelet Count - Automated : 127 K/uL  Mean Cell Volume : 101.6 fl  Mean Cell Hemoglobin : 32.4 pg  Mean Cell Hemoglobin Concentration : 31.9 gm/dL  Auto Neutrophil # : x  Auto Lymphocyte # : x  Auto Monocyte # : x  Auto Eosinophil # : x  Auto Basophil # : x  Auto Neutrophil % : x  Auto Lymphocyte % : x  Auto Monocyte % : x  Auto Eosinophil % : x  Auto Basophil % : x    07-27    132<L>  |  96  |  28<H>  ----------------------------<  281<H>  4.5   |  22  |  0.39<L>    Ca    9.0      27 Jul 2021 06:35          Rad:    Assessment/Plan past 12 months, did you worry that your food would run out before you got money to buy more?: No     Within the past 12 months, did the food you bought just not last and you didn t have money to get more?: No   Depression: Not at risk (12/3/2024)    PHQ-2     PHQ-2 Score: 0   Housing Stability: Low Risk  (1/26/2025)    Housing Stability     Do you have housing? : Yes     Are you worried about losing your housing?: No   Tobacco Use: Low Risk  (1/26/2025)    Patient History     Smoking Tobacco Use: Never     Smokeless Tobacco Use: Never     Passive Exposure: Never   Financial Resource Strain: Low Risk  (1/26/2025)    Financial Resource Strain     Within the past 12 months, have you or your family members you live with been unable to get utilities (heat, electricity) when it was really needed?: No   Alcohol Use: Not At Risk (11/4/2020)    Received from [facility]    AUDIT-C     Frequency of Alcohol Consumption: Never     Average Number of Drinks: Not asked     Frequency of Binge Drinking: Never   Transportation Needs: Low Risk  (1/26/2025)    Transportation Needs     Within the past 12 months, has lack of transportation kept you from medical appointments, getting your medicines, non-medical meetings or appointments, work, or from getting things that you need?: No   Physical Activity: Not on file   Interpersonal Safety: Low Risk  (1/25/2025)    Interpersonal Safety     Do you feel physically and emotionally safe where you currently live?: Yes     Within the past 12 months, have you been hit, slapped, kicked or otherwise physically hurt by someone?: No     Within the past 12 months, have you been humiliated or emotionally abused in other ways by your partner or ex-partner?: No   Stress: Not on file   Social Connections: Unknown (12/31/2021)    [facility], [facility]    Social Connections      Frequency of Communication with Friends and Family: Not on file   Health Literacy: Not on file       Functional Status:  Prior to admission patient needed assistance:   Dependent ADLs:: Ambulation-walker  Dependent IADLs:: Transportation  Assesssment of Functional Status: Not at baseline with ADL Functioning    Mental Health Status:          Chemical Dependency Status:                Values/Beliefs:  Spiritual, Cultural Beliefs, Protestant Practices, Values that affect care:                 Discussed  Partnership in Safe Discharge Planning  document with patient/family: No    Additional Information:    SW met with pt at the bedside to introduce SW role and discuss discharge plans. Pt shares that she lives at home alone and uses a walker at baseline. Pt reports no O2 at baseline. Pt has a daughter in the area but she works full time so has limited support during the day. SW shared that PT is consulted to make discharge recommendations, if pt is recommended for TCU she would have to choose from the limited ACO reach list due to observation status. She anticipates that this will be the plan and would want referrals to [facility] and [facility]. She would not want [facility] and would refuse to go there. She she thinks that her niece would be able to transport but would like to touch base once discharge plan is known. The pt may be ok with a shared room.     Next Steps: follow for discharge recommendations, if TCU, [facility].     Kami Andrews/MARYAN Fang, SW  Inpatient Care Coordination  Emergency Room /Float  [phone]    Kami Andrews, SW

## 2025-01-27 NOTE — PROGRESS NOTES
[facility]    Medicine Progress Note - Hospitalist Service    Date of Admission:  1/25/2025    Assessment & Plan   Chasity Hodgson is a 79 year old female with a past medical history of nonocclusive CAD, hyperlipidemia, hypertension, PAD, diabetes mellitus, hyperlipidemia, hypothyroidism, history of DVT not on blood thinners, ANNETTE who presents with fall, arm pain and periprosthetic left humeral fracture     POD #1 ORIF left proximal humerus  Mechanical fall complicated by left periprosthetic humeral fracture    - patient lives in a [facility] by herself      - she states that she was putting a flowerpot down when she lost her balance and fell against her walker onto her left side    - X-ray/CT of the humerus showed an angulated periprosthetic fracture in the proximal shaft of the left humerus    - s/p ORIF    - pain control    - OT/PT eval: will need TCU     Diabetes mellitus    - resume Lantus 10 units in the morning     - cont ISS    - can resume mealtime NovoLog once eating normally (maybe tomorrow)    - cont jardiance    Non-obstructive CAD, HTN, HL    - cont PTA carvedilol, ARB    - continue fenofibrate and zetia    CKD    - about at her baseline    - encouraged PO    - monitor    Hypothyroidism    - continue synthroid    GERD    - continue PPI and pepcid    Obesity, ANNETTE    - CPAP    - complicates cares    Called and updated daughter        Diet: Advance Diet as Tolerated: Regular Diet Adult; Moderate Consistent Carb (60 g CHO per Meal) Diet    DVT Prophylaxis: Pneumatic Compression Devices, start lovenox tomorrow  Bello Catheter: Not present  Lines: None     Cardiac Monitoring: None  Code Status: Full Code      Clinically Significant Risk Factors Present on Admission                 # Drug Induced Platelet Defect: home medication list includes an antiplatelet medication   # Hypertension: Noted on problem list      # Anemia: based on hgb <11      # DMII: A1C = N/A within past 6 months   #  "Obesity: Estimated body mass index is 37.65 kg/m  as calculated from the following:    Height as of this encounter: 1.6 m (5' 3\").    Weight as of this encounter: 96.4 kg (212 lb 8.4 oz).         # Financial/Environmental Concerns:           Social Drivers of Health     [facility]    Social Connections          Disposition Plan     Medically Ready for Discharge: Anticipated in 2-4 Days    Romero Andrews MD  Hospitalist Service  [facility]  Securely message with Veracode (more info)  Text page via [facility] Paging/Directory   ______________________________________________________________________    Interval History   Patient is in bed. Doing well. Still has some shoulder pain. No chest pain, sob, abdo pain. Eating and drinking    Physical Exam   Vital Signs: Temp: 98.5  F (36.9  C) Temp src: Oral BP: (!) 154/62 Pulse: 79   Resp: 18 SpO2: 98 % O2 Device: None (Room air)    Weight: 212 lbs 8.38 oz    Constitutional: awake, alert, cooperative, no apparent distress, and appears stated age  Eyes: Lids and lashes normal, pupils equal, round and reactive to light, extra ocular muscles intact, sclera clear, conjunctiva normal  ENT: Normocephalic, without obvious abnormality, atraumatic, sinuses nontender on palpation, external ears without lesions, oral pharynx with moist mucous membranes, tonsils without erythema or exudates, gums normal and good dentition.  Respiratory: No increased work of breathing, good air exchange, clear to auscultation bilaterally, no crackles or wheezing  Cardiovascular: Normal apical impulse, regular rate and rhythm, normal S1 and S2, no S3 or S4, and no murmur noted  GI: No scars, normal bowel sounds, soft, non-distended, non-tender, no masses palpated, no hepatosplenomegally  Skin: bruising left shoulder, sling, dressing clean  Musculoskeletal: bilat lower extrem edema    Medical " Decision Making       30 MINUTES SPENT BY ME on the date of service doing chart review, history, exam, documentation & further activities per the note.      Data     I have personally reviewed the following data over the past 24 hrs:    10.3  \   8.6 (L)   / 167     135 104 32.6 (H) /  126 (H)   4.9 23 1.37 (H) \       Imaging results reviewed over the past 24 hrs:   Recent Results (from the past 24 hours)   XR Humerus Port Left G/E 2 Views    Narrative    EXAM: XR HUMERUS PORTABLE LEFT G/E 2 VIEWS  LOCATION: [facility] [place]  DATE: 01/26/2025    INDICATION: Postop x-ray.  COMPARISON: 01/25/2024.      Impression    IMPRESSION: Improved alignment of the periprosthetic left humeral shaft fracture status post plate and screw fixation. Glenohumeral implant is again seen. Bones are demineralized.      XR Hand Port Left G/E 3 Views    Narrative    EXAM: XR HAND PORTABLE LEFT G/E 3 VIEWS, XR FOREARM PORTABLE LEFT 2 VIEWS  LOCATION: [facility] [place]  DATE: 01/26/2025    INDICATION: Hand pain.  COMPARISON: None.      Impression    IMPRESSION: Bones are demineralized. There is central erosive change and advanced arthrosis involving DIP joints of the middle, ring and to a lesser degree small fingers, finding which can be seen with erosive osteoarthritis and appears to be chronic.   There is no evidence of an acute displaced left hand fracture. Dorsal soft tissue swelling.    The left forearm is intact without evidence of an acute displaced fracture or malalignment.     XR Forearm Port Left 2 Views    Narrative    EXAM: XR HAND PORTABLE LEFT G/E 3 VIEWS, XR FOREARM PORTABLE LEFT 2 VIEWS  LOCATION: [facility] [place]  DATE: 01/26/2025    INDICATION: Hand pain.  COMPARISON: None.      Impression    IMPRESSION: Bones are demineralized. There is central erosive change and advanced arthrosis involving DIP joints of the middle, ring and to a lesser degree small fingers, finding which  can be seen with erosive osteoarthritis and appears to be chronic.   There is no evidence of an acute displaced left hand fracture. Dorsal soft tissue swelling.    The left forearm is intact without evidence of an acute displaced fracture or malalignment.

## 2025-01-27 NOTE — PLAN OF CARE
"PRIMARY DIAGNOSIS: Left humeral fracture, status post open reduction internal fixation, Fall  OUTPATIENT/OBSERVATION GOALS TO BE MET BEFORE DISCHARGE:  1. Pain Status: Improved but still requiring IV narcotics.    2. Return to near baseline physical activity: No    3. Cleared for discharge by consultants (if involved): No    Discharge Planner Nurse   Safe discharge environment identified: Yes  Barriers to discharge: Yes  A & O X 4 with forgetfulness. No S/SX of respiratory distress. Lung sounds diminished. Denies chills, shortness of breath, dizziness, nausea, vomit, or diarrhea. Bowel sounds present and active. Bowel movement reported this shift.Colostomy patent with good output . Passing flatus. Pure wick in place with adequate output.  A X 2 with cares. On contact isolation for MRSA. Redness/discoloration noted to lower extremities. PIV saline infusing LR at 10 mL. On mod CHO diet.Surgical dressing CDI.  Left arm in a sling. CMS intact. PCDs on both bilaterally. On  capno. Denies numbness, or tingling. Patient on IV Ancef Q 8 hours. Scheduled Tylenol, prn Oxycodone, Dilaudid, and Robaxin available for pain management. C/O pain rating at 7-10, prn Dilaudid given X 2, Oxycodone ,and Robaxin given X 1-See MAR. Patient reported relief after prn pain medications given. On blood glucose monitoring and sliding scale for DM 2 management. Afebrile. Orthopedic/PT/OT/CM/SW following.   Will continue to provide supportive cares.   BP (!) 145/56 (BP Location: Right arm)   Pulse 62   Temp 98.5  F (36.9  C) (Oral)   Resp 15   Ht 1.6 m (5' 3\")   Wt 96.4 kg (212 lb 8.4 oz)   LMP  (LMP Unknown)   SpO2 96%   BMI 37.65 kg/m          Entered by: Radha Avery RN 01/27/2025 2:51 AM     Problem: Adult Inpatient Plan of Care  Goal: Plan of Care Review  Description: The Plan of Care Review/Shift note should be completed every shift.  The Outcome Evaluation is a brief statement about your assessment that the patient is " "improving, declining, or no change.  This information will be displayed automatically on your shift  note.  Outcome: Progressing  Flowsheets (Taken 1/27/2025 0222)  Plan of Care Reviewed With: patient  Goal: Patient-Specific Goal (Individualized)  Description: You can add care plan individualizations to a care plan. Examples of Individualization might be:  \"Parent requests to be called daily at 9am for status\", \"I have a hard time hearing out of my right ear\", or \"Do not touch me to wake me up as it startles  me\".  Outcome: Progressing  Goal: Absence of Hospital-Acquired Illness or Injury  Outcome: Progressing  Intervention: Identify and Manage Fall Risk  Recent Flowsheet Documentation  Taken 1/26/2025 2053 by Radha Avery RN  Safety Promotion/Fall Prevention:   activity supervised   assistive device/personal items within reach   clutter free environment maintained   nonskid shoes/slippers when out of bed   patient and family education   room near nurse's station   room organization consistent   safety round/check completed  Intervention: Prevent Skin Injury  Recent Flowsheet Documentation  Taken 1/26/2025 2053 by Radha Avery RN  Body Position:   right   turned  Skin Protection:   adhesive use limited   tubing/devices free from skin contact  Intervention: Prevent and Manage VTE (Venous Thromboembolism) Risk  Recent Flowsheet Documentation  Taken 1/26/2025 2053 by Radha Avery RN  VTE Prevention/Management: SCDs on (sequential compression devices)  Intervention: Prevent Infection  Recent Flowsheet Documentation  Taken 1/26/2025 2053 by Radha Avery RN  Infection Prevention:   single patient room provided   hand hygiene promoted  Goal: Optimal Comfort and Wellbeing  Outcome: Progressing  Intervention: Monitor Pain and Promote Comfort  Recent Flowsheet Documentation  Taken 1/26/2025 2050 by Radha Avery RN  Pain Management Interventions: medication (see MAR)  Goal: Readiness for " Transition of Care  Outcome: Progressing     Problem: Skin Injury Risk Increased  Goal: Skin Health and Integrity  Outcome: Progressing  Intervention: Plan: Nurse Driven Intervention: Moisture Management  Recent Flowsheet Documentation  Taken 1/26/2025 2053 by Radha Avery RN  Moisture Interventions:   Encourage regular toileting   Incontinence pad  Intervention: Plan: Nurse Driven Intervention: Friction and Shear  Recent Flowsheet Documentation  Taken 1/26/2025 2053 by Radha Avery RN  Friction/Shear Interventions: HOB 30 degrees or less  Intervention: Optimize Skin Protection  Recent Flowsheet Documentation  Taken 1/26/2025 2053 by Radha Avery RN  Pressure Reduction Techniques:   weight shift assistance provided   pressure points protected  Skin Protection:   adhesive use limited   tubing/devices free from skin contact  Activity Management: activity adjusted per tolerance  Head of Bed (HOB) Positioning: HOB at 20-30 degrees     Problem: Comorbidity Management  Goal: Blood Glucose Levels Within Targeted Range  Outcome: Progressing  Intervention: Monitor and Manage Glycemia  Recent Flowsheet Documentation  Taken 1/26/2025 2053 by Radha Avery RN  Medication Review/Management: medications reviewed  Goal: Blood Pressure in Desired Range  Outcome: Progressing  Intervention: Maintain Blood Pressure Management  Recent Flowsheet Documentation  Taken 1/26/2025 2053 by Radha Avery RN  Medication Review/Management: medications reviewed     Please review provider order for any additional goals.   Nurse to notify provider when observation goals have been met and patient is ready for discharge.      Plan of Care Reviewed With: patient

## 2025-01-27 NOTE — PROGRESS NOTES
Clinic Care Coordination Contact    The Community Health Worker planned to call for a Care Coordination outreach to follow up on goals and action steps.     Did Not Contact Patient.    Per Chart Review, patient is currently admitted at [facility] as of 1/25/25.    HERMANN Hwang  Clinic Care Coordination  [facility]: [facility] [place], and [facility]  Phone: [phone]

## 2025-01-27 NOTE — PLAN OF CARE
INPATIENT NOTE: CARES PROVIDED FROM [id number]  Diagnosis:  POD ORIF LUE.   Temp: 98.1  F (36.7  C) Temp src: Oral BP: (!) 167/47 Pulse: 80   Resp: 18 SpO2: 98 % O2 Device: None (Room air)      Labs: B, 252, 208. , Hgb: 8.6, Cr: 1.37    Pt is Aox4, Elevated BP on RA. can be forgetful. Denies CP/SOB/Nausea. LUE pain with activity/reposition. Given scheduled tylenol, PRN Oxycodone x2, IV dilaudid x1. LUE is in sling, CMS intact. Dressing CDI. Ice applied intermittently. Ax2 cares in bed. PW in place. Colostomy bag emptied with soft loose brown stool and gas. BLE edema. Tolerating diet, sliding scale insulin given per order. Pt is using IS. Able to make needs known    Lines: PIV is SL. Does have bruising around site, tender when palpitated but no pain when flushing.     Plan: PT to follow, pain management, SW following for TCU placement.     Plan of Care Reviewed With: patient  Overall Patient Progress: improving  Outcome Evaluation: POD1, AOx4, Hypertensive on RA. LUE in sling, dressing CDI. Ax2; PW, Pain management.    Problem: Adult Inpatient Plan of Care  Goal: Plan of Care Review  Outcome: Progressing  Flowsheets (Taken 2025 1721)  Outcome Evaluation:   POD1, AOx4, Hypertensive on RA. LUE in sling, dressing CDI. Ax2   PW, Pain management.  Plan of Care Reviewed With: patient  Overall Patient Progress: improving  Goal: Patient-Specific Goal (Individualized)  Outcome: Progressing  Goal: Absence of Hospital-Acquired Illness or Injury  Outcome: Progressing  Intervention: Identify and Manage Fall Risk  Recent Flowsheet Documentation  Taken 2025 0800 by Renate Haynes RN  Safety Promotion/Fall Prevention:   activity supervised   assistive device/personal items within reach   clutter free environment maintained   nonskid shoes/slippers when out of bed   patient and family education   room near nurse's station   room organization consistent   safety round/check completed  Intervention:  Prevent Skin Injury  Recent Flowsheet Documentation  Taken 1/27/2025 1413 by Renate Haynes RN  Body Position: turned  Taken 1/27/2025 0800 by Renate Haynes RN  Body Position: position maintained  Intervention: Prevent and Manage VTE (Venous Thromboembolism) Risk  Recent Flowsheet Documentation  Taken 1/27/2025 0800 by Renate Haynes RN  VTE Prevention/Management: SCDs on (sequential compression devices)  Intervention: Prevent Infection  Recent Flowsheet Documentation  Taken 1/27/2025 0800 by Renate Haynes RN  Infection Prevention:   single patient room provided   hand hygiene promoted  Goal: Optimal Comfort and Wellbeing  Outcome: Progressing  Goal: Readiness for Transition of Care  Outcome: Progressing     Problem: Comorbidity Management  Goal: Blood Glucose Levels Within Targeted Range  Outcome: Progressing  Intervention: Monitor and Manage Glycemia  Recent Flowsheet Documentation  Taken 1/27/2025 0800 by Renate Haynes RN  Medication Review/Management: medications reviewed  Goal: Blood Pressure in Desired Range  Outcome: Progressing  Intervention: Maintain Blood Pressure Management  Recent Flowsheet Documentation  Taken 1/27/2025 0800 by Renate Haynes RN  Medication Review/Management: medications reviewed

## 2025-01-27 NOTE — PROGRESS NOTES
Care Management Follow Up    Length of Stay (days): 0    Expected Discharge Date: 01/28/2025     Concerns to be Addressed: discharge planning     Patient plan of care discussed at interdisciplinary rounds: Yes    Anticipated Discharge Disposition:  Transitional Care  Anticipated Discharge Services:    Anticipated Discharge DME:      Patient/family educated on Medicare website which has current facility and service quality ratings:  yes  Education Provided on the Discharge Plan:  yes  Patient/Family in Agreement with the Plan:  yes    Referrals Placed by CM/SW:  Post Acute Facilites    Discussed  Partnership in Safe Discharge Planning  document with patient/family: No     Handoff Completed: No, handoff not indicated or clinically appropriate    Additional Information:  CM sent TCU referrals to [facility], [facility].    Next Steps: follow up on TCU referrals    Caro Paz RN, BSN  Inpatient Care Coordination  [facility]  [phone]

## 2025-01-27 NOTE — PLAN OF CARE
"PRIMARY DIAGNOSIS: Left humeral fracture, status post open reduction internal fixation, Fall  OUTPATIENT/OBSERVATION GOALS TO BE MET BEFORE DISCHARGE:  1. Pain Status: Improved but still requiring IV narcotics.    2. Return to near baseline physical activity: No    3. Cleared for discharge by consultants (if involved): No    Discharge Planner Nurse   Safe discharge environment identified: Yes  Barriers to discharge: Yes  A & O X 4 with forgetfulness. No S/SX of respiratory distress. Lung sounds diminished. Denies chills, shortness of breath, dizziness, nausea, vomit, or diarrhea. Bowel sounds present and active. Bowel movement reported this shift.Colostomy patent with good output . Passing flatus. Pure wick in place with adequate output.  A X 2 with cares. On contact isolation for MRSA. Redness/discoloration noted to lower extremities. PIV saline infusing LR at 10 mL. On mod CHO diet.Surgical dressing CDI.  Left arm in a sling. CMS intact. PCDs on both bilaterally. On  capno. Denies numbness, or tingling. Patient on IV Ancef Q 8 hours. Scheduled Tylenol, prn Oxycodone, Dilaudid, and Robaxin available for pain management. C/O pain rating at 7-10, prn Dilaudid , and  Oxycodone given X 2 ,Robaxin given X 1-See MAR. Patient reported relief after prn pain medications given. On blood glucose monitoring and sliding scale for DM 2 management. Afebrile. Orthopedic/PT/OT/CM/SW following.   Will continue to provide supportive cares.   BP (!) 155/51 (BP Location: Right arm)   Pulse 78   Temp 98.4  F (36.9  C) (Oral)   Resp 13   Ht 1.6 m (5' 3\")   Wt 96.4 kg (212 lb 8.4 oz)   LMP  (LMP Unknown)   SpO2 95%   BMI 37.65 kg/m          Entered by: Radha Avery RN 01/27/2025 6:37 AM     Problem: Adult Inpatient Plan of Care  Goal: Plan of Care Review  Description: The Plan of Care Review/Shift note should be completed every shift.  The Outcome Evaluation is a brief statement about your assessment that the patient is " "improving, declining, or no change.  This information will be displayed automatically on your shift  note.  Outcome: Progressing  Flowsheets (Taken 1/27/2025 0222)  Plan of Care Reviewed With: patient  Goal: Patient-Specific Goal (Individualized)  Description: You can add care plan individualizations to a care plan. Examples of Individualization might be:  \"Parent requests to be called daily at 9am for status\", \"I have a hard time hearing out of my right ear\", or \"Do not touch me to wake me up as it startles  me\".  Outcome: Progressing  Goal: Absence of Hospital-Acquired Illness or Injury  Outcome: Progressing  Intervention: Identify and Manage Fall Risk  Recent Flowsheet Documentation  Taken 1/26/2025 2053 by Radha Avery RN  Safety Promotion/Fall Prevention:   activity supervised   assistive device/personal items within reach   clutter free environment maintained   nonskid shoes/slippers when out of bed   patient and family education   room near nurse's station   room organization consistent   safety round/check completed  Intervention: Prevent Skin Injury  Recent Flowsheet Documentation  Taken 1/26/2025 2053 by Radha Avery RN  Body Position:   right   turned  Skin Protection:   adhesive use limited   tubing/devices free from skin contact  Intervention: Prevent and Manage VTE (Venous Thromboembolism) Risk  Recent Flowsheet Documentation  Taken 1/26/2025 2053 by Radha Avery RN  VTE Prevention/Management: SCDs on (sequential compression devices)  Intervention: Prevent Infection  Recent Flowsheet Documentation  Taken 1/26/2025 2053 by Radha Avery RN  Infection Prevention:   single patient room provided   hand hygiene promoted  Goal: Optimal Comfort and Wellbeing  Outcome: Progressing  Intervention: Monitor Pain and Promote Comfort  Recent Flowsheet Documentation  Taken 1/26/2025 2050 by Radha Avery RN  Pain Management Interventions: medication (see MAR)  Goal: Readiness for " Transition of Care  Outcome: Progressing     Problem: Skin Injury Risk Increased  Goal: Skin Health and Integrity  Outcome: Progressing  Intervention: Plan: Nurse Driven Intervention: Moisture Management  Recent Flowsheet Documentation  Taken 1/26/2025 2053 by Radha Avery RN  Moisture Interventions:   Encourage regular toileting   Incontinence pad  Intervention: Plan: Nurse Driven Intervention: Friction and Shear  Recent Flowsheet Documentation  Taken 1/26/2025 2053 by Radha Avery RN  Friction/Shear Interventions: HOB 30 degrees or less  Intervention: Optimize Skin Protection  Recent Flowsheet Documentation  Taken 1/26/2025 2053 by Radha Avery RN  Pressure Reduction Techniques:   weight shift assistance provided   pressure points protected  Skin Protection:   adhesive use limited   tubing/devices free from skin contact  Activity Management: activity adjusted per tolerance  Head of Bed (HOB) Positioning: HOB at 20-30 degrees     Problem: Comorbidity Management  Goal: Blood Glucose Levels Within Targeted Range  Outcome: Progressing  Intervention: Monitor and Manage Glycemia  Recent Flowsheet Documentation  Taken 1/26/2025 2053 by Radha Avery RN  Medication Review/Management: medications reviewed  Goal: Blood Pressure in Desired Range  Outcome: Progressing  Intervention: Maintain Blood Pressure Management  Recent Flowsheet Documentation  Taken 1/26/2025 2053 by Radha Avery RN  Medication Review/Management: medications reviewed     Please review provider order for any additional goals.   Nurse to notify provider when observation goals have been met and patient is ready for discharge.      Plan of Care Reviewed With: patient

## 2025-01-28 ENCOUNTER — APPOINTMENT (OUTPATIENT)
Dept: OCCUPATIONAL THERAPY | Facility: CLINIC | Age: 80
DRG: 493 | End: 2025-01-28
Payer: MEDICARE

## 2025-01-28 LAB
ANION GAP SERPL CALCULATED.3IONS-SCNC: 11 MMOL/L (ref 7–15)
BASOPHILS # BLD AUTO: 0 10E3/UL (ref 0–0.2)
BASOPHILS NFR BLD AUTO: 0 %
BUN SERPL-MCNC: 35.2 MG/DL (ref 8–23)
CALCIUM SERPL-MCNC: 8.2 MG/DL (ref 8.8–10.4)
CHLORIDE SERPL-SCNC: 105 MMOL/L (ref 98–107)
CREAT SERPL-MCNC: 1.18 MG/DL (ref 0.51–0.95)
EGFRCR SERPLBLD CKD-EPI 2021: 47 ML/MIN/1.73M2
EOSINOPHIL # BLD AUTO: 0.2 10E3/UL (ref 0–0.7)
EOSINOPHIL NFR BLD AUTO: 3 %
ERYTHROCYTE [DISTWIDTH] IN BLOOD BY AUTOMATED COUNT: 12.5 % (ref 10–15)
GLUCOSE BLDC GLUCOMTR-MCNC: 114 MG/DL (ref 70–99)
GLUCOSE BLDC GLUCOMTR-MCNC: 152 MG/DL (ref 70–99)
GLUCOSE BLDC GLUCOMTR-MCNC: 160 MG/DL (ref 70–99)
GLUCOSE BLDC GLUCOMTR-MCNC: 161 MG/DL (ref 70–99)
GLUCOSE BLDC GLUCOMTR-MCNC: 185 MG/DL (ref 70–99)
GLUCOSE SERPL-MCNC: 116 MG/DL (ref 70–99)
HCO3 SERPL-SCNC: 21 MMOL/L (ref 22–29)
HCT VFR BLD AUTO: 26.2 % (ref 35–47)
HGB BLD-MCNC: 8.7 G/DL (ref 11.7–15.7)
IMM GRANULOCYTES # BLD: 0.1 10E3/UL
IMM GRANULOCYTES NFR BLD: 1 %
LYMPHOCYTES # BLD AUTO: 2.5 10E3/UL (ref 0.8–5.3)
LYMPHOCYTES NFR BLD AUTO: 32 %
MCH RBC QN AUTO: 31.1 PG (ref 26.5–33)
MCHC RBC AUTO-ENTMCNC: 33.2 G/DL (ref 31.5–36.5)
MCV RBC AUTO: 94 FL (ref 78–100)
MONOCYTES # BLD AUTO: 0.9 10E3/UL (ref 0–1.3)
MONOCYTES NFR BLD AUTO: 11 %
NEUTROPHILS # BLD AUTO: 4.3 10E3/UL (ref 1.6–8.3)
NEUTROPHILS NFR BLD AUTO: 54 %
NRBC # BLD AUTO: 0 10E3/UL
NRBC BLD AUTO-RTO: 0 /100
PLATELET # BLD AUTO: 151 10E3/UL (ref 150–450)
POTASSIUM SERPL-SCNC: 4.9 MMOL/L (ref 3.4–5.3)
RBC # BLD AUTO: 2.8 10E6/UL (ref 3.8–5.2)
SODIUM SERPL-SCNC: 137 MMOL/L (ref 135–145)
WBC # BLD AUTO: 7.9 10E3/UL (ref 4–11)

## 2025-01-28 PROCEDURE — 99233 SBSQ HOSP IP/OBS HIGH 50: CPT | Performed by: HOSPITALIST

## 2025-01-28 PROCEDURE — 250N000013 HC RX MED GY IP 250 OP 250 PS 637: Performed by: STUDENT IN AN ORGANIZED HEALTH CARE EDUCATION/TRAINING PROGRAM

## 2025-01-28 PROCEDURE — 250N000013 HC RX MED GY IP 250 OP 250 PS 637: Performed by: HOSPITALIST

## 2025-01-28 PROCEDURE — 120N000001 HC R&B MED SURG/OB

## 2025-01-28 PROCEDURE — 82565 ASSAY OF CREATININE: CPT | Performed by: HOSPITALIST

## 2025-01-28 PROCEDURE — 97530 THERAPEUTIC ACTIVITIES: CPT | Mod: GO

## 2025-01-28 PROCEDURE — 82435 ASSAY OF BLOOD CHLORIDE: CPT | Performed by: HOSPITALIST

## 2025-01-28 PROCEDURE — 85025 COMPLETE CBC W/AUTO DIFF WBC: CPT | Performed by: HOSPITALIST

## 2025-01-28 PROCEDURE — 36415 COLL VENOUS BLD VENIPUNCTURE: CPT | Performed by: HOSPITALIST

## 2025-01-28 PROCEDURE — 80048 BASIC METABOLIC PNL TOTAL CA: CPT | Performed by: HOSPITALIST

## 2025-01-28 RX ORDER — OXYCODONE HYDROCHLORIDE 5 MG/1
5 TABLET ORAL EVERY 4 HOURS PRN
Status: DISCONTINUED | OUTPATIENT
Start: 2025-01-28 | End: 2025-01-29

## 2025-01-28 RX ORDER — OXYCODONE HYDROCHLORIDE 10 MG/1
10 TABLET ORAL EVERY 4 HOURS PRN
Status: DISCONTINUED | OUTPATIENT
Start: 2025-01-28 | End: 2025-01-29

## 2025-01-28 RX ORDER — ACETAMINOPHEN 325 MG/1
975 TABLET ORAL EVERY 8 HOURS
DISCHARGE
Start: 2025-01-28

## 2025-01-28 RX ORDER — OXYCODONE HYDROCHLORIDE 5 MG/1
5 TABLET ORAL EVERY 4 HOURS PRN
Qty: 20 TABLET | Refills: 0 | Status: SHIPPED | OUTPATIENT
Start: 2025-01-28

## 2025-01-28 RX ORDER — ASPIRIN 81 MG/1
81 TABLET ORAL DAILY
DISCHARGE
Start: 2025-01-29

## 2025-01-28 RX ORDER — AMOXICILLIN 250 MG
1 CAPSULE ORAL 2 TIMES DAILY PRN
DISCHARGE
Start: 2025-01-28

## 2025-01-28 RX ADMIN — OXYCODONE HYDROCHLORIDE 5 MG: 5 TABLET ORAL at 02:39

## 2025-01-28 RX ADMIN — SENNOSIDES AND DOCUSATE SODIUM 1 TABLET: 50; 8.6 TABLET ORAL at 09:38

## 2025-01-28 RX ADMIN — SENNOSIDES AND DOCUSATE SODIUM 1 TABLET: 50; 8.6 TABLET ORAL at 20:28

## 2025-01-28 RX ADMIN — ACETAMINOPHEN 975 MG: 325 TABLET, FILM COATED ORAL at 20:28

## 2025-01-28 RX ADMIN — PANTOPRAZOLE SODIUM 40 MG: 40 TABLET, DELAYED RELEASE ORAL at 20:28

## 2025-01-28 RX ADMIN — FENOFIBRATE 134 MG: 160 TABLET, FILM COATED ORAL at 22:21

## 2025-01-28 RX ADMIN — ACETAMINOPHEN 975 MG: 325 TABLET, FILM COATED ORAL at 04:06

## 2025-01-28 RX ADMIN — OXYCODONE HYDROCHLORIDE 10 MG: 10 TABLET ORAL at 20:28

## 2025-01-28 RX ADMIN — LOSARTAN POTASSIUM 100 MG: 50 TABLET, FILM COATED ORAL at 09:57

## 2025-01-28 RX ADMIN — METHOCARBAMOL 500 MG: 500 TABLET ORAL at 09:57

## 2025-01-28 RX ADMIN — EMPAGLIFLOZIN 10 MG: 10 TABLET, FILM COATED ORAL at 20:28

## 2025-01-28 RX ADMIN — ASPIRIN 81 MG: 81 TABLET, COATED ORAL at 09:38

## 2025-01-28 RX ADMIN — FAMOTIDINE 20 MG: 20 TABLET, FILM COATED ORAL at 09:38

## 2025-01-28 RX ADMIN — OXYCODONE HYDROCHLORIDE 5 MG: 5 TABLET ORAL at 09:38

## 2025-01-28 RX ADMIN — LEVOTHYROXINE SODIUM 112 MCG: 0.11 TABLET ORAL at 09:38

## 2025-01-28 RX ADMIN — CARVEDILOL 12.5 MG: 6.25 TABLET, FILM COATED ORAL at 09:57

## 2025-01-28 RX ADMIN — ACETAMINOPHEN 975 MG: 325 TABLET, FILM COATED ORAL at 13:14

## 2025-01-28 RX ADMIN — EZETIMIBE 10 MG: 10 TABLET ORAL at 20:28

## 2025-01-28 RX ADMIN — METHOCARBAMOL 500 MG: 500 TABLET ORAL at 16:01

## 2025-01-28 RX ADMIN — OXYCODONE HYDROCHLORIDE 5 MG: 5 TABLET ORAL at 14:07

## 2025-01-28 RX ADMIN — PANTOPRAZOLE SODIUM 40 MG: 40 TABLET, DELAYED RELEASE ORAL at 09:42

## 2025-01-28 RX ADMIN — INSULIN GLARGINE 10 UNITS: 100 INJECTION, SOLUTION SUBCUTANEOUS at 09:58

## 2025-01-28 ASSESSMENT — ACTIVITIES OF DAILY LIVING (ADL)
ADLS_ACUITY_SCORE: 71
ADLS_ACUITY_SCORE: 75
ADLS_ACUITY_SCORE: 71
ADLS_ACUITY_SCORE: 75
ADLS_ACUITY_SCORE: 71
ADLS_ACUITY_SCORE: 75
ADLS_ACUITY_SCORE: 75
ADLS_ACUITY_SCORE: 71
ADLS_ACUITY_SCORE: 75
ADLS_ACUITY_SCORE: 71
ADLS_ACUITY_SCORE: 71
ADLS_ACUITY_SCORE: 75
ADLS_ACUITY_SCORE: 71

## 2025-01-28 NOTE — PROGRESS NOTES
Care Management Follow Up    Length of Stay (days): 1    Expected Discharge Date: 01/29/2025     Concerns to be Addressed: discharge planning     Patient plan of care discussed at interdisciplinary rounds: Yes    Anticipated Discharge Disposition: Transitional Care  Anticipated Discharge Services: None  Anticipated Discharge DME: None    Patient/family educated on Medicare website which has current facility and service quality ratings: yes  Education Provided on the Discharge Plan: Yes  Patient/Family in Agreement with the Plan:  yes    Referrals Placed by CM/SW: Post Acute Facilities    Discussed  Partnership in Safe Discharge Planning  document with patient/family: No     Handoff Completed: No, handoff not indicated or clinically appropriate    Additional Information:  CM/SW following for TCU placement at discharge. [facility] considering pending bed availability, patient would like to accept placement there. She stated either her daughter or niece would be able to provide discharge transport.     Per [facility] admissions, they may have a bed available in 2 days.     Next Steps: follow for discharge planning    Caro Paz RN, BSN  Inpatient Care Coordination  [facility]  [phone]

## 2025-01-28 NOTE — PLAN OF CARE
PRIMARY DIAGNOSIS: FALL, LEFT HUMERAL FRACTURE   OUTPATIENT/OBSERVATION GOALS TO BE MET BEFORE DISCHARGE:  1. Pain Status: Improved-controlled with oral pain medications.    2. Return to near baseline physical activity: No    3. Cleared for discharge by consultants (if involved): No    Discharge Planner Nurse   Safe discharge environment identified: Yes  Barriers to discharge: Yes       Entered by: Nata Davis RN 01/28/2025 6:28 AM   Patient alert and oriented. Vitally stable on room air. Left arm surgical dressing clean, dry and intact. Ice packs given for comfort.  Sling in place. CMS intact- no numbness/tingling reported. Patient reports 8/10 left arm pain- PRN Oxycodone given x2 overnight with improvement. Turned and repositioned as needed and per patient tolerance. Incontinent of bladder- Purewick in place- cares provided. Ostomy intact and emptied as needed. No nausea/vomiting reported. Blood sugars stable and insulin given per orders. Edema to bilateral lower extremities R>L at baseline per patient. Patient calls appropriately.     Please review provider order for any additional goals.   Nurse to notify provider when observation goals have been met and patient is ready for discharge.      Goal Outcome Evaluation:      Plan of Care Reviewed With: patient    Overall Patient Progress: improvingOverall Patient Progress: improving       Problem: Adult Inpatient Plan of Care  Goal: Plan of Care Review  Description: The Plan of Care Review/Shift note should be completed every shift.  The Outcome Evaluation is a brief statement about your assessment that the patient is improving, declining, or no change.  This information will be displayed automatically on your shift  note.  Outcome: Progressing  Flowsheets (Taken 1/28/2025 0229)  Plan of Care Reviewed With: patient  Overall Patient Progress: improving  Goal: Patient-Specific Goal (Individualized)  Description: You can add care plan individualizations to a care  "plan. Examples of Individualization might be:  \"Parent requests to be called daily at 9am for status\", \"I have a hard time hearing out of my right ear\", or \"Do not touch me to wake me up as it startles  me\".  Outcome: Progressing  Goal: Absence of Hospital-Acquired Illness or Injury  Outcome: Progressing  Intervention: Identify and Manage Fall Risk  Recent Flowsheet Documentation  Taken 1/27/2025 2000 by Nata Davis RN  Safety Promotion/Fall Prevention:   supervised activity   nonskid shoes/slippers when out of bed   clutter free environment maintained  Intervention: Prevent Skin Injury  Recent Flowsheet Documentation  Taken 1/27/2025 2000 by Nata Davis RN  Body Position: supine, head elevated  Intervention: Prevent and Manage VTE (Venous Thromboembolism) Risk  Recent Flowsheet Documentation  Taken 1/27/2025 2000 by Nata Davis RN  VTE Prevention/Management: SCDs on (sequential compression devices)  Intervention: Prevent Infection  Recent Flowsheet Documentation  Taken 1/27/2025 2000 by Nata Davis RN  Infection Prevention:   single patient room provided   hand hygiene promoted  Goal: Optimal Comfort and Wellbeing  Outcome: Progressing  Goal: Readiness for Transition of Care  Outcome: Progressing     Problem: Skin Injury Risk Increased  Goal: Skin Health and Integrity  Outcome: Progressing  Intervention: Plan: Nurse Driven Intervention: Moisture Management  Recent Flowsheet Documentation  Taken 1/27/2025 2000 by Nata Davis RN  Moisture Interventions:   No brief in bed   Incontinence pad  Bathing/Skin Care: incontinence care  Intervention: Plan: Nurse Driven Intervention: Friction and Shear  Recent Flowsheet Documentation  Taken 1/27/2025 2000 by Nata Davis RN  Friction/Shear Interventions: HOB 30 degrees or less  Intervention: Optimize Skin Protection  Recent Flowsheet Documentation  Taken 1/27/2025 2000 by Nata Davis RN  Head of Bed (HOB) Positioning: HOB at 20-30 degrees     Problem: Comorbidity " Management  Goal: Blood Glucose Levels Within Targeted Range  Outcome: Progressing  Intervention: Monitor and Manage Glycemia  Recent Flowsheet Documentation  Taken 1/27/2025 2000 by Nata Davis, RN  Medication Review/Management: medications reviewed  Goal: Blood Pressure in Desired Range  Outcome: Progressing  Intervention: Maintain Blood Pressure Management  Recent Flowsheet Documentation  Taken 1/27/2025 2000 by Nata Davis, RN  Medication Review/Management: medications reviewed

## 2025-01-28 NOTE — PROGRESS NOTES
[facility]    Medicine Progress Note - Hospitalist Service    Date of Admission:  1/25/2025    Assessment & Plan   Chasity Hodgson is a 79 year old female with a past medical history of nonocclusive CAD, hyperlipidemia, hypertension, PAD, diabetes mellitus, hyperlipidemia, hypothyroidism, history of DVT not on blood thinners, ANNETTE who presents with fall, arm pain and periprosthetic left humeral fracture     POD #2 ORIF left proximal humerus  Mechanical fall complicated by left periprosthetic humeral fracture    - patient lives in a [facility] by herself      - she states that she was putting a flowerpot down when she lost her balance and fell against her walker onto her left side    - X-ray/CT of the humerus showed an angulated periprosthetic fracture in the proximal shaft of the left humerus    - s/p ORIF    - pain control    - OT/PT eval: will need TCU     Diabetes mellitus    - resume Lantus 10 units in the morning     - cont ISS    - can resume mealtime NovoLog once eating normally (maybe tomorrow)    - cont jardiance    Non-obstructive CAD, HTN, HL    - cont PTA carvedilol, ARB    - continue fenofibrate and zetia    CKD    - about at her baseline (1.28 today)    - encouraged PO    - monitor    Hypothyroidism    - continue synthroid    GERD    - continue PPI and pepcid    Obesity, ANNETTE    - CPAP    - complicates cares    Called and updated daughter        Diet: Advance Diet as Tolerated: Regular Diet Adult; Moderate Consistent Carb (60 g CHO per Meal) Diet    DVT Prophylaxis: Pneumatic Compression Devices, start lovenox tomorrow  Bello Catheter: Not present  Lines: None     Cardiac Monitoring: None  Code Status: Full Code      Clinically Significant Risk Factors                   # Hypertension: Noted on problem list           # DMII: A1C = N/A within past 6 months   # Obesity: Estimated body mass index is 37.65 kg/m  as calculated from the following:    Height as of this encounter: 1.6 m (5'  "3\").    Weight as of this encounter: 96.4 kg (212 lb 8.4 oz)., PRESENT ON ADMISSION       # Financial/Environmental Concerns:           Social Drivers of Health     [facility]    Social Connections          Disposition Plan     Medically Ready for Discharge: Anticipated in 2-4 Days    Romero Andrews MD  Hospitalist Service  [facility]  Securely message with [employer] (more info)  Text page via [employer] Paging/Directory   ______________________________________________________________________    Interval History   Patient is in bed. Doing well. No chest pain, sob, abdo pain. Eating and drinking. No constipation    Physical Exam   Vital Signs: Temp: 98.5  F (36.9  C) Temp src: Oral BP: (!) 177/73 Pulse: 76   Resp: 16 SpO2: 98 % O2 Device: None (Room air)    Weight: 212 lbs 8.38 oz    Constitutional: awake, alert, cooperative, no apparent distress, and appears stated age  Eyes: Lids and lashes normal, pupils equal, round and reactive to light, extra ocular muscles intact, sclera clear, conjunctiva normal  ENT: Normocephalic, without obvious abnormality, atraumatic, sinuses nontender on palpation, external ears without lesions, oral pharynx with moist mucous membranes, tonsils without erythema or exudates, gums normal and good dentition.  Respiratory: No increased work of breathing, good air exchange, clear to auscultation bilaterally, no crackles or wheezing  Cardiovascular: Normal apical impulse, regular rate and rhythm, normal S1 and S2, no S3 or S4, and no murmur noted  GI: No scars, normal bowel sounds, soft, non-distended, non-tender, no masses palpated, no hepatosplenomegally  Skin: bruising left shoulder, sling, dressing clean  Musculoskeletal: bilat lower extrem edema    Medical Decision Making       30 MINUTES SPENT BY ME on the date of service doing chart review, history, exam, documentation & " further activities per the note.      Data     I have personally reviewed the following data over the past 24 hrs:    7.9  \   8.7 (L)   / 151     137 105 35.2 (H) /  114 (H)   4.9 21 (L) 1.18 (H) \       Imaging results reviewed over the past 24 hrs:   No results found for this or any previous visit (from the past 24 hours).

## 2025-01-28 NOTE — PROGRESS NOTES
Orthopedic Surgery  Chasity Hodgson  01/28/2025     Admit Date:  1/25/2025    POD: 2 Days Post-Op   Procedure(s):  Open reduction internal fixation left periprosthetic humerus fracture with stem retention    Patient resting comfortably in bed.    LUE is tender post operatively. Pain on movements   Tolerating oral intake.    Denies nausea or vomiting.  Denies chest pain or shortness of breath.  Hypertensive, afebrile     Temp:  [98.1  F (36.7  C)-98.5  F (36.9  C)] 98.5  F (36.9  C)  Pulse:  [76-80] 76  Resp:  [16-18] 16  BP: (136-183)/(45-75) 177/73  Cuff Mean (mmHg):  [111] 111  SpO2:  [95 %-98 %] 98 %    Alert and oriented.   Dressing is clean, dry, and intact.   Minimal erythema of the surrounding skin. Scattered ecchymosis moderately surrounding surgical site.    Left upper extremity is NVI.  Able to flex, extend, abduct, and adduct digits.  Radial pulse palpable.  Digits are warm and well-perfused.  Sensation intact to light touch.    Labs:  Recent Labs   Lab Test 01/28/25  0742 01/27/25  0611 01/25/25  0953   WBC 7.9 10.3 8.9   HGB 8.7* 8.6* 12.1    167 151     Recent Labs   Lab Test 02/23/18  0625   INR 0.98     Recent Labs   Lab Test 08/01/18  1152   CRP 7.2       A/P    1.   DVT prophylaxis: Aspirin 81mg daily for 3 weeks starting POD #1      Mobilize with PT/OT   Non weight bearing. Remain in shoulder sling at all times, OK to remove for hygiene and skin assessment. OK to loosen to exercise elbow, wrist and fingers.    Continue current pain regimen, utilize Robaxin    Dressings: Keep intact.  Change if >50% saturated or peeling off.    Follow-up: 2 weeks post-op with Dr. Myrick     2. Disposition   Anticipate d/c to TCU when medically cleared and progressing in PT.      Josette Thao PA-C  [facility]

## 2025-01-29 ENCOUNTER — APPOINTMENT (OUTPATIENT)
Dept: OCCUPATIONAL THERAPY | Facility: CLINIC | Age: 80
DRG: 493 | End: 2025-01-29
Payer: MEDICARE

## 2025-01-29 LAB
GLUCOSE BLDC GLUCOMTR-MCNC: 115 MG/DL (ref 70–99)
GLUCOSE BLDC GLUCOMTR-MCNC: 116 MG/DL (ref 70–99)
GLUCOSE BLDC GLUCOMTR-MCNC: 134 MG/DL (ref 70–99)
GLUCOSE BLDC GLUCOMTR-MCNC: 151 MG/DL (ref 70–99)
GLUCOSE BLDC GLUCOMTR-MCNC: 177 MG/DL (ref 70–99)
GLUCOSE BLDC GLUCOMTR-MCNC: 182 MG/DL (ref 70–99)

## 2025-01-29 PROCEDURE — 250N000013 HC RX MED GY IP 250 OP 250 PS 637: Performed by: INTERNAL MEDICINE

## 2025-01-29 PROCEDURE — 99232 SBSQ HOSP IP/OBS MODERATE 35: CPT | Performed by: INTERNAL MEDICINE

## 2025-01-29 PROCEDURE — 250N000013 HC RX MED GY IP 250 OP 250 PS 637: Performed by: STUDENT IN AN ORGANIZED HEALTH CARE EDUCATION/TRAINING PROGRAM

## 2025-01-29 PROCEDURE — 97530 THERAPEUTIC ACTIVITIES: CPT | Mod: GO

## 2025-01-29 PROCEDURE — 120N000001 HC R&B MED SURG/OB

## 2025-01-29 PROCEDURE — 250N000013 HC RX MED GY IP 250 OP 250 PS 637: Performed by: HOSPITALIST

## 2025-01-29 RX ORDER — OXYCODONE HYDROCHLORIDE 5 MG/1
5 TABLET ORAL EVERY 4 HOURS PRN
Status: DISPENSED | OUTPATIENT
Start: 2025-01-29

## 2025-01-29 RX ADMIN — PROCHLORPERAZINE MALEATE 5 MG: 5 TABLET ORAL at 12:20

## 2025-01-29 RX ADMIN — ACETAMINOPHEN 975 MG: 325 TABLET, FILM COATED ORAL at 21:00

## 2025-01-29 RX ADMIN — PANTOPRAZOLE SODIUM 40 MG: 40 TABLET, DELAYED RELEASE ORAL at 08:11

## 2025-01-29 RX ADMIN — EMPAGLIFLOZIN 10 MG: 10 TABLET, FILM COATED ORAL at 21:01

## 2025-01-29 RX ADMIN — SENNOSIDES AND DOCUSATE SODIUM 1 TABLET: 50; 8.6 TABLET ORAL at 21:00

## 2025-01-29 RX ADMIN — LEVOTHYROXINE SODIUM 112 MCG: 0.11 TABLET ORAL at 08:11

## 2025-01-29 RX ADMIN — OXYCODONE HYDROCHLORIDE 10 MG: 10 TABLET ORAL at 04:45

## 2025-01-29 RX ADMIN — ACETAMINOPHEN 975 MG: 325 TABLET, FILM COATED ORAL at 12:20

## 2025-01-29 RX ADMIN — OXYCODONE HYDROCHLORIDE 10 MG: 10 TABLET ORAL at 10:34

## 2025-01-29 RX ADMIN — EZETIMIBE 10 MG: 10 TABLET ORAL at 21:00

## 2025-01-29 RX ADMIN — OXYCODONE HYDROCHLORIDE 5 MG: 5 TABLET ORAL at 23:45

## 2025-01-29 RX ADMIN — PANTOPRAZOLE SODIUM 40 MG: 40 TABLET, DELAYED RELEASE ORAL at 21:00

## 2025-01-29 RX ADMIN — METHOCARBAMOL 1000 MG: 500 TABLET ORAL at 08:31

## 2025-01-29 RX ADMIN — SENNOSIDES AND DOCUSATE SODIUM 1 TABLET: 50; 8.6 TABLET ORAL at 08:10

## 2025-01-29 RX ADMIN — INSULIN GLARGINE 10 UNITS: 100 INJECTION, SOLUTION SUBCUTANEOUS at 08:15

## 2025-01-29 RX ADMIN — OXYCODONE HYDROCHLORIDE 10 MG: 10 TABLET ORAL at 19:45

## 2025-01-29 RX ADMIN — CARVEDILOL 12.5 MG: 6.25 TABLET, FILM COATED ORAL at 08:10

## 2025-01-29 RX ADMIN — ASPIRIN 81 MG: 81 TABLET, COATED ORAL at 08:11

## 2025-01-29 RX ADMIN — ACETAMINOPHEN 975 MG: 325 TABLET, FILM COATED ORAL at 04:00

## 2025-01-29 RX ADMIN — FENOFIBRATE 134 MG: 160 TABLET, FILM COATED ORAL at 23:47

## 2025-01-29 RX ADMIN — FAMOTIDINE 20 MG: 20 TABLET, FILM COATED ORAL at 08:10

## 2025-01-29 RX ADMIN — LOSARTAN POTASSIUM 100 MG: 50 TABLET, FILM COATED ORAL at 08:10

## 2025-01-29 ASSESSMENT — ACTIVITIES OF DAILY LIVING (ADL)
ADLS_ACUITY_SCORE: 71

## 2025-01-29 NOTE — PROGRESS NOTES
Orthopedic Surgery  Chasity Hodgson  01/29/2025     Admit Date:  1/25/2025    POD: 3 Days Post-Op   Procedure(s):  Open reduction internal fixation left periprosthetic humerus fracture with stem retention    Patient resting comfortably in bed this morning with nursing at bedside. Swelling and ecchymosis has decreased since evaluation yesterday, and with this pain is improved in comparison to yesterday. WE discussed mobilizing today out of bed.   Tolerating oral intake.    Denies nausea or vomiting.  Denies chest pain or shortness of breath.  VSS, afebrile     Temp:  [97.7  F (36.5  C)-98.6  F (37  C)] 97.7  F (36.5  C)  Pulse:  [69-80] 69  Resp:  [17-20] 18  BP: (105-170)/(47-71) 136/64  SpO2:  [95 %-100 %] 100 %    Alert and oriented.   Dressing is clean, dry, and intact.   Minimal erythema of the surrounding skin. Scattered ecchymosis moderately surrounding surgical site (improving significantly).    Left upper extremity is NVI.  Able to flex, extend, abduct, and adduct digits.  Radial pulse palpable.  Digits are warm and well-perfused.  Sensation intact to light touch.    Labs:  Recent Labs   Lab Test 01/28/25  0742 01/27/25  0611 01/25/25  0953   WBC 7.9 10.3 8.9   HGB 8.7* 8.6* 12.1    167 151     Recent Labs   Lab Test 02/23/18  0625   INR 0.98     Recent Labs   Lab Test 08/01/18  1152   CRP 7.2       A/P    1.   DVT prophylaxis: Aspirin 81mg daily for 3 weeks starting POD #1      Mobilize with PT/OT   Non weight bearing. Remain in shoulder sling at all times, OK to remove for hygiene and skin assessment. OK to loosen to exercise elbow, wrist and fingers.    Continue current pain regimen, utilize Robaxin    Dressings: Keep intact.  Change if >50% saturated or peeling off.    Follow-up: 2 weeks post-op with Dr. Myrick     2. Disposition   Anticipate d/c to TCU when medically cleared and progressing in PT.      Josette Thao PA-C  [facility]

## 2025-01-29 NOTE — PLAN OF CARE
INPATIENT NOTE: CARES PROVIDED FROM [id number]  Diagnosis:  POD ORIF LUE.   Vital signs:Temp: 98.5  F (36.9  C) Temp src: Oral BP: 116/71 Pulse: 70   Resp: 20 SpO2: 100 % O2 Device: None (Room air)     Labs: B, 152,185. , Hgb: 8.7, Cr: 1.18    Pt is Aox4, softer Bps on RA.Denies CP/SOB/Nausea. LUE pain with activity/reposition. Given scheduled tylenol, PRN Oxycodone 5 mg. LUE is in sling, CMS intact. Dressing CDI. Ice applied intermittently. Ax2 cares in bed. PW in place. Colostomy bag for elimination. BLE edema. Tolerating diet, sliding scale insulin given per order. Pt is using IS. Able to make needs known    Lines:  None    Plan: PT to follow, pain management, SW following for TCU placement.     Problem: Adult Inpatient Plan of Care  Goal: Plan of Care Review  Outcome: Progressing  Flowsheets (Taken 2025 1721)  Outcome Evaluation:   POD1, AOx4, Hypertensive on RA. LUE in sling, dressing CDI. Ax2   PW, Pain management.  Plan of Care Reviewed With: patient  Overall Patient Progress: improving  Goal: Patient-Specific Goal (Individualized)  Outcome: Progressing  Goal: Absence of Hospital-Acquired Illness or Injury  Outcome: Progressing  Intervention: Identify and Manage Fall Risk  Recent Flowsheet Documentation  Taken 2025 0800 by Renate Haynes RN  Safety Promotion/Fall Prevention:   activity supervised   assistive device/personal items within reach   clutter free environment maintained   nonskid shoes/slippers when out of bed   patient and family education   room near nurse's station   room organization consistent   safety round/check completed  Intervention: Prevent Skin Injury  Recent Flowsheet Documentation  Taken 2025 1413 by Renate Haynes RN  Body Position: turned  Taken 2025 0800 by Renate Haynes RN  Body Position: position maintained  Intervention: Prevent and Manage VTE (Venous Thromboembolism) Risk  Recent Flowsheet  Documentation  Taken 1/27/2025 0800 by Renate Haynes RN  VTE Prevention/Management: SCDs on (sequential compression devices)  Intervention: Prevent Infection  Recent Flowsheet Documentation  Taken 1/27/2025 0800 by Renate Haynes RN  Infection Prevention:   single patient room provided   hand hygiene promoted  Goal: Optimal Comfort and Wellbeing  Outcome: Progressing  Goal: Readiness for Transition of Care  Outcome: Progressing     Problem: Comorbidity Management  Goal: Blood Glucose Levels Within Targeted Range  Outcome: Progressing  Intervention: Monitor and Manage Glycemia  Recent Flowsheet Documentation  Taken 1/27/2025 0800 by Renate Haynes RN  Medication Review/Management: medications reviewed  Goal: Blood Pressure in Desired Range  Outcome: Progressing  Intervention: Maintain Blood Pressure Management  Recent Flowsheet Documentation  Taken 1/27/2025 0800 by Renate Haynes RN  Medication Review/Management: medications reviewed

## 2025-01-29 NOTE — PROGRESS NOTES
[facility]    Hospitalist Progress Note      Assessment & Plan   Chasity Hodgson is a 79 year old woman who was admitted on 1/25/2025. PMH significant for nonocclusive CAD, hyperlipidemia, hypertension, PAD, diabetes mellitus, hyperlipidemia, hypothyroidism, history of DVT not on blood thinners, ANNETTE who presents with fall, arm pain and periprosthetic left humeral fracture     POD #2 ORIF left proximal humerus  Mechanical fall complicated by left periprosthetic humeral fracture    - patient lives in a [facility] by herself      - she states that she was putting a flowerpot down when she lost her balance and fell against her walker onto her left side    - X-ray/CT of the humerus showed an angulated periprosthetic fracture in the proximal shaft of the left humerus    - s/p ORIF    - pain control    - OT/PT eval: will need TCU. Bed planned 1/30.     Diabetes mellitus    - resume Lantus 10 units in the morning     - cont ISS    - continue prandial Novolog.    - cont jardiance     Non-obstructive CAD, HTN, HL    - cont PTA carvedilol, ARB    - continue fenofibrate and zetia     CKD    - about at her baseline (1.18 today)    - encouraged PO    - monitor     Hypothyroidism    - continue synthroid     GERD    - continue PPI and pepcid     Obesity, ANNETTE    - CPAP    - complicates cares     DVT Prophylaxis: Pneumatic Compression Devices  Code Status: Full Code  Medically Ready for Discharge: Ready Now  Expected discharge: Discharge to TCU tomorrow.    Floresita Roy MD FACP  Hospitalist Service  [facility]      Interval History   Nursing did note that patient was extremely sedated after 10 mg oxycodone dose.  Will plan to decrease oxycodone availability and assess pain control at a lower dose.  Planning discharge to TCU tomorrow.  Otherwise no acute events.    -Data reviewed today: I reviewed all new labs and imaging results over the last 24 hours.       Physical Exam   Temp: 98.1  F (36.7   C) Temp src: Oral BP: 138/51 Pulse: 67   Resp: 16 SpO2: 100 % O2 Device: None (Room air)    Vitals:    01/25/25 1422   Weight: 96.4 kg (212 lb 8.4 oz)     Vital Signs with Ranges  Temp:  [97.7  F (36.5  C)-98.6  F (37  C)] 98.1  F (36.7  C)  Pulse:  [66-80] 67  Resp:  [16-20] 16  BP: (116-171)/(51-71) 138/51  SpO2:  [95 %-100 %] 100 %  I/O last 3 completed shifts:  In: -   Out: 1350 [Urine:1250; Stool:100]    Constitutional: Pleasant woman seen resting in bed.  Answering questions appropriately.  Alert.  Oriented to recent events.  No acute distress.    HEENT: NCAT. EOMI. Moist oral mucosa.  Respiratory: Clear to auscultation bilaterally. No crackles or wheezes.  Cardiovascular: Regular rate and rhythm. No murmur.  GI: Soft, nontender, nondistended. Normoactive bowel sounds.   Musculoskeletal: Left arm in sling.  Surgical dressing noted, clean and dry.  Patient with good movement of left fingers.  Ecchymosis noted to the left arm.  Neurologic: Alert and oriented x3. No focal neurologic deficits. Did not assess gait.      Medications   Current Facility-Administered Medications   Medication Dose Route Frequency Provider Last Rate Last Admin     Current Facility-Administered Medications   Medication Dose Route Frequency Provider Last Rate Last Admin    acetaminophen (TYLENOL) tablet 975 mg  975 mg Oral Q8H Erich Myrick MD   975 mg at 01/29/25 1220    aspirin EC tablet 81 mg  81 mg Oral Daily Erich Myrick MD   81 mg at 01/29/25 0811    carvedilol (COREG) tablet 12.5 mg  12.5 mg Oral BID w/meals Erich Myrick MD   12.5 mg at 01/29/25 0810    empagliflozin (JARDIANCE) tablet 10 mg  10 mg Oral QPM Erich Myrick MD   10 mg at 01/28/25 2028    ezetimibe (ZETIA) tablet 10 mg  10 mg Oral QPM Erich Myrick MD   10 mg at 01/28/25 2028    famotidine (PEPCID) tablet 20 mg  20 mg Oral Daily Romero Andrews MD   20 mg at 01/29/25 0810    fenofibrate (LOFIBRA) tablet 134 mg  134 mg Oral At Bedtime  Erich Myrick MD   134 mg at 01/28/25 2221    insulin aspart (NovoLOG) injection (RAPID ACTING)  10 Units Subcutaneous Daily with breakfast Romero Andrews MD   10 Units at 01/29/25 0815    And    insulin aspart (NovoLOG) injection (RAPID ACTING)  12 Units Subcutaneous Daily before supper Romero Andrews MD   12 Units at 01/28/25 1734    insulin aspart (NovoLOG) injection (RAPID ACTING)  1-5 Units Subcutaneous At Bedtime MasonKathy arana PA-C   1 Units at 01/27/25 2210    insulin aspart (NovoLOG) injection (RAPID ACTING)  1-7 Units Subcutaneous TID AC Romero Andrews MD   1 Units at 01/28/25 1734    insulin glargine (LANTUS PEN) injection 10 Units  10 Units Subcutaneous QAM Romero Andrews MD   10 Units at 01/29/25 0815    levothyroxine (SYNTHROID/LEVOTHROID) tablet 112 mcg  112 mcg Oral Daily Erich Myrick MD   112 mcg at 01/29/25 0811    losartan (COZAAR) tablet 100 mg  100 mg Oral Daily Erich Myrick MD   100 mg at 01/29/25 0810    pantoprazole (PROTONIX) EC tablet 40 mg  40 mg Oral BID Erich Myrick MD   40 mg at 01/29/25 0811    polyethylene glycol (MIRALAX) Packet 17 g  17 g Oral Daily Erich Myrick MD        senna-docusate (SENOKOT-S/PERICOLACE) 8.6-50 MG per tablet 1 tablet  1 tablet Oral BID Erich Myrick MD   1 tablet at 01/29/25 0810    tranexamic acid 1 g in 100 mL NS IV bag (premix)  1 g Intravenous Once Erich Myrick MD           Data   Recent Labs   Lab 01/29/25  1304 01/29/25  0735 01/29/25  0204 01/28/25  0833 01/28/25  0742 01/27/25  0757 01/27/25  0611 01/25/25  1053 01/25/25  0953   WBC  --   --   --   --  7.9  --  10.3  --  8.9   HGB  --   --   --   --  8.7*  --  8.6*  --  12.1   MCV  --   --   --   --  94  --  94  --  94   PLT  --   --   --   --  151  --  167  --  151   NA  --   --   --   --  137  --  135  --  140   POTASSIUM  --   --   --   --  4.9  --  4.9  --  4.7   CHLORIDE  --   --   --   --  105  --  104  --  107   CO2  --   --   --   --  21*   --  23  --  22   BUN  --   --   --   --  35.2*  --  32.6*  --  27.6*   CR  --   --   --   --  1.18*  --  1.37*  --  1.13*   ANIONGAP  --   --   --   --  11  --  8  --  11   RINKU  --   --   --   --  8.2*  --  8.2*  --  9.2   * 116* 134*   < > 116*   < > 151*   < > 206*    < > = values in this interval not displayed.       No results found for this or any previous visit (from the past 24 hours).

## 2025-01-29 NOTE — PROGRESS NOTES
Care Management Follow Up    Length of Stay (days): 2    Expected Discharge Date: 01/30/2025     Concerns to be Addressed: discharge planning     Patient plan of care discussed at interdisciplinary rounds: Yes    Anticipated Discharge Disposition: Transitional Care     Anticipated Discharge Services: None  Anticipated Discharge DME: None    Patient/family educated on Medicare website which has current facility and service quality ratings: yes  Education Provided on the Discharge Plan: Yes  Patient/Family in Agreement with the Plan:      Referrals Placed by CM/SW: Post Acute Facilities    Discussed  Partnership in Safe Discharge Planning  document with patient/family: Yes     Handoff Completed: Yes, [facility] PCP: Internal handoff referral completed    Additional Information:  CM following for discharge planning, pt has been accepted to a LTC bed under TCU benefits at [facility] on 1/30 after 1300. Updated provider and pt. Pt would like dtr to transport, updated dtr who is in agreement and will transport on 1/30 at 1330. Pt to bring some of her own ostomy supplies, dtr reports that those supplies are already at [facility] and they will bring them to TCU as well.     Will speak with bedside RN to ensure that pt is able to safely get in/out of vehicle.     Update 1435: Per bedside RN, pt requiring assist of 2 and is likely unable to transfer in and out of vehicle safely.     Reviewed out of pocket cost for [facility] transport, $90.70 base and $6.08 per mile to the destination. Spoke with pt's dtr Yadira, they expressed understanding and are agreeable to this. [facility] transport scheduled for [id number], dtr aware.     Next Steps: coordinate discharge and orders on 1/30.     Charley Morales RN BSN   Inpatient Care Coordination  [facility]   Phone [phone]

## 2025-01-29 NOTE — PLAN OF CARE
"23:00-07:30    Alert and oriented. Complained of pain 9/10 PRN Oxycodone given and ice application done.  CMS LUE intact. Inconinent of urine, PW in place. Plan TCU, SW following.    Goal Outcome Evaluation:      Plan of Care Reviewed With: patient    Overall Patient Progress: improvingOverall Patient Progress: improving    Outcome Evaluation: remained in pain PRN Oxycodone given      Problem: Adult Inpatient Plan of Care  Goal: Plan of Care Review  Description: The Plan of Care Review/Shift note should be completed every shift.  The Outcome Evaluation is a brief statement about your assessment that the patient is improving, declining, or no change.  This information will be displayed automatically on your shift  note.  Outcome: Progressing  Flowsheets (Taken 1/29/2025 0532)  Outcome Evaluation: remained in pain PRN Oxycodone given  Plan of Care Reviewed With: patient  Overall Patient Progress: improving  Goal: Patient-Specific Goal (Individualized)  Description: You can add care plan individualizations to a care plan. Examples of Individualization might be:  \"Parent requests to be called daily at 9am for status\", \"I have a hard time hearing out of my right ear\", or \"Do not touch me to wake me up as it startles  me\".  Outcome: Progressing  Goal: Absence of Hospital-Acquired Illness or Injury  Outcome: Progressing  Intervention: Identify and Manage Fall Risk  Recent Flowsheet Documentation  Taken 1/29/2025 0032 by Megan Moffett RN  Safety Promotion/Fall Prevention: activity supervised  Intervention: Prevent Infection  Recent Flowsheet Documentation  Taken 1/29/2025 0032 by Megan Moffett, RN  Infection Prevention:   single patient room provided   rest/sleep promoted  Goal: Optimal Comfort and Wellbeing  Outcome: Progressing  Intervention: Monitor Pain and Promote Comfort  Recent Flowsheet Documentation  Taken 1/29/2025 0445 by Megan Moffett, RN  Pain Management Interventions:   medication (see MAR)   cold " applied  Goal: Readiness for Transition of Care  Outcome: Progressing     Problem: Skin Injury Risk Increased  Goal: Skin Health and Integrity  Outcome: Progressing     Problem: Comorbidity Management  Goal: Blood Glucose Levels Within Targeted Range  Outcome: Progressing  Intervention: Monitor and Manage Glycemia  Recent Flowsheet Documentation  Taken 1/29/2025 0032 by Megan Moffett RN  Medication Review/Management: medications reviewed  Goal: Blood Pressure in Desired Range  Outcome: Progressing  Intervention: Maintain Blood Pressure Management  Recent Flowsheet Documentation  Taken 1/29/2025 0032 by Megan Moffett RN  Medication Review/Management: medications reviewed

## 2025-01-30 ENCOUNTER — APPOINTMENT (OUTPATIENT)
Dept: OCCUPATIONAL THERAPY | Facility: CLINIC | Age: 80
DRG: 493 | End: 2025-01-30
Payer: MEDICARE

## 2025-01-30 VITALS
HEIGHT: 63 IN | DIASTOLIC BLOOD PRESSURE: 47 MMHG | HEART RATE: 72 BPM | SYSTOLIC BLOOD PRESSURE: 130 MMHG | WEIGHT: 212.52 LBS | TEMPERATURE: 98.5 F | OXYGEN SATURATION: 99 % | RESPIRATION RATE: 17 BRPM | BODY MASS INDEX: 37.66 KG/M2

## 2025-01-30 LAB
ANION GAP SERPL CALCULATED.3IONS-SCNC: 9 MMOL/L (ref 7–15)
BASOPHILS # BLD AUTO: 0 10E3/UL (ref 0–0.2)
BASOPHILS NFR BLD AUTO: 0 %
BUN SERPL-MCNC: 38 MG/DL (ref 8–23)
CALCIUM SERPL-MCNC: 8.7 MG/DL (ref 8.8–10.4)
CHLORIDE SERPL-SCNC: 102 MMOL/L (ref 98–107)
CREAT SERPL-MCNC: 1.19 MG/DL (ref 0.51–0.95)
EGFRCR SERPLBLD CKD-EPI 2021: 47 ML/MIN/1.73M2
EOSINOPHIL # BLD AUTO: 0.2 10E3/UL (ref 0–0.7)
EOSINOPHIL NFR BLD AUTO: 3 %
ERYTHROCYTE [DISTWIDTH] IN BLOOD BY AUTOMATED COUNT: 12.4 % (ref 10–15)
GLUCOSE BLDC GLUCOMTR-MCNC: 110 MG/DL (ref 70–99)
GLUCOSE BLDC GLUCOMTR-MCNC: 125 MG/DL (ref 70–99)
GLUCOSE BLDC GLUCOMTR-MCNC: 131 MG/DL (ref 70–99)
GLUCOSE BLDC GLUCOMTR-MCNC: 178 MG/DL (ref 70–99)
GLUCOSE BLDC GLUCOMTR-MCNC: 195 MG/DL (ref 70–99)
GLUCOSE SERPL-MCNC: 123 MG/DL (ref 70–99)
HCO3 SERPL-SCNC: 23 MMOL/L (ref 22–29)
HCT VFR BLD AUTO: 27.7 % (ref 35–47)
HGB BLD-MCNC: 9.1 G/DL (ref 11.7–15.7)
IMM GRANULOCYTES # BLD: 0.1 10E3/UL
IMM GRANULOCYTES NFR BLD: 1 %
LYMPHOCYTES # BLD AUTO: 1.7 10E3/UL (ref 0.8–5.3)
LYMPHOCYTES NFR BLD AUTO: 22 %
MCH RBC QN AUTO: 30.6 PG (ref 26.5–33)
MCHC RBC AUTO-ENTMCNC: 32.9 G/DL (ref 31.5–36.5)
MCV RBC AUTO: 93 FL (ref 78–100)
MONOCYTES # BLD AUTO: 0.8 10E3/UL (ref 0–1.3)
MONOCYTES NFR BLD AUTO: 10 %
NEUTROPHILS # BLD AUTO: 5.1 10E3/UL (ref 1.6–8.3)
NEUTROPHILS NFR BLD AUTO: 64 %
NRBC # BLD AUTO: 0 10E3/UL
NRBC BLD AUTO-RTO: 0 /100
PLATELET # BLD AUTO: 182 10E3/UL (ref 150–450)
POTASSIUM SERPL-SCNC: 4.8 MMOL/L (ref 3.4–5.3)
RBC # BLD AUTO: 2.97 10E6/UL (ref 3.8–5.2)
SODIUM SERPL-SCNC: 134 MMOL/L (ref 135–145)
WBC # BLD AUTO: 7.9 10E3/UL (ref 4–11)

## 2025-01-30 PROCEDURE — 85014 HEMATOCRIT: CPT | Performed by: INTERNAL MEDICINE

## 2025-01-30 PROCEDURE — 250N000013 HC RX MED GY IP 250 OP 250 PS 637: Performed by: STUDENT IN AN ORGANIZED HEALTH CARE EDUCATION/TRAINING PROGRAM

## 2025-01-30 PROCEDURE — 80048 BASIC METABOLIC PNL TOTAL CA: CPT | Performed by: INTERNAL MEDICINE

## 2025-01-30 PROCEDURE — 85004 AUTOMATED DIFF WBC COUNT: CPT | Performed by: INTERNAL MEDICINE

## 2025-01-30 PROCEDURE — 250N000013 HC RX MED GY IP 250 OP 250 PS 637: Performed by: INTERNAL MEDICINE

## 2025-01-30 PROCEDURE — 250N000011 HC RX IP 250 OP 636: Performed by: STUDENT IN AN ORGANIZED HEALTH CARE EDUCATION/TRAINING PROGRAM

## 2025-01-30 PROCEDURE — 36415 COLL VENOUS BLD VENIPUNCTURE: CPT | Performed by: INTERNAL MEDICINE

## 2025-01-30 PROCEDURE — 99232 SBSQ HOSP IP/OBS MODERATE 35: CPT | Performed by: INTERNAL MEDICINE

## 2025-01-30 PROCEDURE — 120N000001 HC R&B MED SURG/OB

## 2025-01-30 PROCEDURE — 97535 SELF CARE MNGMENT TRAINING: CPT | Mod: GO

## 2025-01-30 PROCEDURE — 82565 ASSAY OF CREATININE: CPT | Performed by: INTERNAL MEDICINE

## 2025-01-30 PROCEDURE — 250N000013 HC RX MED GY IP 250 OP 250 PS 637: Performed by: HOSPITALIST

## 2025-01-30 RX ORDER — LOSARTAN POTASSIUM 50 MG/1
50 TABLET ORAL DAILY
Status: ACTIVE | OUTPATIENT
Start: 2025-01-31

## 2025-01-30 RX ADMIN — ACETAMINOPHEN 975 MG: 325 TABLET, FILM COATED ORAL at 21:09

## 2025-01-30 RX ADMIN — SENNOSIDES AND DOCUSATE SODIUM 1 TABLET: 50; 8.6 TABLET ORAL at 08:55

## 2025-01-30 RX ADMIN — EMPAGLIFLOZIN 10 MG: 10 TABLET, FILM COATED ORAL at 21:08

## 2025-01-30 RX ADMIN — LEVOTHYROXINE SODIUM 112 MCG: 0.11 TABLET ORAL at 08:56

## 2025-01-30 RX ADMIN — EZETIMIBE 10 MG: 10 TABLET ORAL at 21:09

## 2025-01-30 RX ADMIN — CARVEDILOL 12.5 MG: 6.25 TABLET, FILM COATED ORAL at 08:55

## 2025-01-30 RX ADMIN — INSULIN GLARGINE 10 UNITS: 100 INJECTION, SOLUTION SUBCUTANEOUS at 09:00

## 2025-01-30 RX ADMIN — OXYCODONE HYDROCHLORIDE 5 MG: 5 TABLET ORAL at 05:43

## 2025-01-30 RX ADMIN — ASPIRIN 81 MG: 81 TABLET, COATED ORAL at 08:55

## 2025-01-30 RX ADMIN — PANTOPRAZOLE SODIUM 40 MG: 40 TABLET, DELAYED RELEASE ORAL at 08:55

## 2025-01-30 RX ADMIN — PANTOPRAZOLE SODIUM 40 MG: 40 TABLET, DELAYED RELEASE ORAL at 21:08

## 2025-01-30 RX ADMIN — ACETAMINOPHEN 975 MG: 325 TABLET, FILM COATED ORAL at 12:34

## 2025-01-30 RX ADMIN — CARVEDILOL 12.5 MG: 6.25 TABLET, FILM COATED ORAL at 18:28

## 2025-01-30 RX ADMIN — OXYCODONE HYDROCHLORIDE 5 MG: 5 TABLET ORAL at 18:44

## 2025-01-30 RX ADMIN — OXYCODONE HYDROCHLORIDE 5 MG: 5 TABLET ORAL at 14:27

## 2025-01-30 RX ADMIN — LOSARTAN POTASSIUM 100 MG: 50 TABLET, FILM COATED ORAL at 08:55

## 2025-01-30 RX ADMIN — SENNOSIDES AND DOCUSATE SODIUM 1 TABLET: 50; 8.6 TABLET ORAL at 21:08

## 2025-01-30 RX ADMIN — ACETAMINOPHEN 975 MG: 325 TABLET, FILM COATED ORAL at 05:43

## 2025-01-30 RX ADMIN — FENOFIBRATE 134 MG: 160 TABLET, FILM COATED ORAL at 21:08

## 2025-01-30 RX ADMIN — FAMOTIDINE 20 MG: 20 TABLET, FILM COATED ORAL at 08:55

## 2025-01-30 RX ADMIN — ONDANSETRON 4 MG: 4 TABLET, ORALLY DISINTEGRATING ORAL at 10:24

## 2025-01-30 ASSESSMENT — ACTIVITIES OF DAILY LIVING (ADL)
ADLS_ACUITY_SCORE: 71
ADLS_ACUITY_SCORE: 69
ADLS_ACUITY_SCORE: 69
ADLS_ACUITY_SCORE: 71

## 2025-01-30 NOTE — PLAN OF CARE
"Summary: POD#3 - ORIF L. periprosthetic humerus fracture (1/29/24 0700 - 1900)    Orientation: A&O x 4. Drowsy after oxy 10mg and 1000mg of Robaxin  Vitals/Tele: VSS on RA  IV Access/drains: Colostomy. No PIV  Diet: Mod Carb (ACHS BG checks)  Mobility: A2 w/ gait belt. Up in chair 2x  Pain: Oxy 10mg and 1000mg of Robaxin; scheduled tylenol, ice  GI/: Colostomy & purewick in place  Wound/Skin: Scattered bruising. Dressing C/D/I. CMS intact. Mepilex applied to sacrum/coccyx  Discharge Plan: 1/30 to [facility] after 1300    See Flow sheets for assessment     Problem: Adult Inpatient Plan of Care  Goal: Plan of Care Review  Description: The Plan of Care Review/Shift note should be completed every shift.  The Outcome Evaluation is a brief statement about your assessment that the patient is improving, declining, or no change.  This information will be displayed automatically on your shift  note.  Outcome: Progressing  Flowsheets (Taken 1/29/2025 1809)  Outcome Evaluation:   A&O x 4   VSS on RA   Pain managed with Oxy 10mg, ice and Robaxin   A2 w/ gait belt - up in chair x 2. D/C to [place] [facility] after 1300  Plan of Care Reviewed With: patient  Overall Patient Progress: improving  Goal: Patient-Specific Goal (Individualized)  Description: You can add care plan individualizations to a care plan. Examples of Individualization might be:  \"Parent requests to be called daily at 9am for status\", \"I have a hard time hearing out of my right ear\", or \"Do not touch me to wake me up as it startles  me\".  Outcome: Progressing  Goal: Absence of Hospital-Acquired Illness or Injury  Outcome: Progressing  Intervention: Identify and Manage Fall Risk  Recent Flowsheet Documentation  Taken 1/29/2025 0802 by Ruby Rodriguez, RN  Safety Promotion/Fall Prevention: safety round/check completed  Intervention: Prevent Skin Injury  Recent Flowsheet Documentation  Taken 1/29/2025 1455 by Ruby Rodriguez, RN  Body Position:   legs " elevated   neutral body alignment  Taken 1/29/2025 0802 by Ruby Rodriguez RN  Body Position: position maintained  Goal: Optimal Comfort and Wellbeing  Outcome: Progressing  Goal: Readiness for Transition of Care  Outcome: Progressing     Problem: Skin Injury Risk Increased  Goal: Skin Health and Integrity  Outcome: Progressing  Intervention: Plan: Nurse Driven Intervention: Moisture Management  Recent Flowsheet Documentation  Taken 1/29/2025 1455 by Ruby Rodriguez RN  Bathing/Skin Care: incontinence care  Taken 1/29/2025 0802 by Ruby Rodriguez RN  Moisture Interventions:   Incontinence pad   Urinary collection device  Intervention: Plan: Nurse Driven Intervention: Friction and Shear  Recent Flowsheet Documentation  Taken 1/29/2025 0802 by Ruby Rodriguez RN  Friction/Shear Interventions: HOB 30 degrees or less  Intervention: Optimize Skin Protection  Recent Flowsheet Documentation  Taken 1/29/2025 1455 by Ruby Rodriguez RN  Activity Management: back to bed  Head of Bed (HOB) Positioning: HOB at 30 degrees  Taken 1/29/2025 0802 by Ruby Rodriguez RN  Activity Management: activity adjusted per tolerance     Problem: Comorbidity Management  Goal: Blood Glucose Levels Within Targeted Range  Outcome: Progressing  Intervention: Monitor and Manage Glycemia  Recent Flowsheet Documentation  Taken 1/29/2025 0802 by Ruby Rodriguez RN  Medication Review/Management: medications reviewed  Goal: Blood Pressure in Desired Range  Outcome: Progressing  Intervention: Maintain Blood Pressure Management  Recent Flowsheet Documentation  Taken 1/29/2025 0802 by Ruby Rodriguez RN  Medication Review/Management: medications reviewed   Goal Outcome Evaluation:      Plan of Care Reviewed With: patient    Overall Patient Progress: improving    Outcome Evaluation: A&O x 4; VSS on RA; Pain managed with Oxy 10mg, ice and Robaxin; A2 w/ gait belt - up in chair x 2. D/C to [facility] after 1300

## 2025-01-30 NOTE — PROGRESS NOTES
Care Management Follow Up    Length of Stay (days): 3    Expected Discharge Date: 01/31/2025     Concerns to be Addressed: discharge planning     Patient plan of care discussed at interdisciplinary rounds: Yes    Anticipated Discharge Disposition: Transitional Care    Anticipated Discharge Services: None  Anticipated Discharge DME: None    Patient/family educated on Medicare website which has current facility and service quality ratings: yes  Education Provided on the Discharge Plan: Yes  Patient/Family in Agreement with the Plan:      Referrals Placed by CM/SW: Post Acute Facilities  Private pay costs discussed: transportation costs    Discussed  Partnership in Safe Discharge Planning  document with patient/family: Yes     Handoff Completed: Yes, [facility] PCP: Internal handoff referral completed    Additional Information:  Pt not yet medically ready, discharge deferred to tomorrow 1/31 with [facility] transport between [id number] pending medical readiness. Updated TCU admissions and LM for sharona May. Spoke with pt, all questions answered.     Charley Morales RN BSN   Inpatient Care Coordination  [facility]   Phone [phone]

## 2025-01-30 NOTE — PLAN OF CARE
"Goal Outcome Evaluation:      Plan of Care Reviewed With: patient    Overall Patient Progress: improvingOverall Patient Progress: improving    Outcome Evaluation: A/o x 4. Was to discharge to TCU today but when when attempting to get up to chair pt c/o dizziness and feeling she was going to pass out; VSS at the time but hypotensive SBP in 80s. Provider notified. LUE sling in place. Scheduled tylenol and oxycodone for pain. BS checks. Up in chair later in the shift. Large BM. Ostomy bag changed. Tele SR 71.  Plan encourage oral fluids tonight, orthostatic Bps tomorrow and discharge to TCU after 1300 if stable.    Problem: Adult Inpatient Plan of Care  Goal: Plan of Care Review  Description: The Plan of Care Review/Shift note should be completed every shift.  The Outcome Evaluation is a brief statement about your assessment that the patient is improving, declining, or no change.  This information will be displayed automatically on your shift  note.  Outcome: Progressing  Flowsheets (Taken 1/30/2025 1451)  Outcome Evaluation: C/o dizziness and was going to passout. Hypotensive. LUE sling in place. Scheduled tylenol and oxycodone for pain. BS checks. Ostomy bag changed.  Plan of Care Reviewed With: patient  Overall Patient Progress: improving  Goal: Patient-Specific Goal (Individualized)  Description: You can add care plan individualizations to a care plan. Examples of Individualization might be:  \"Parent requests to be called daily at 9am for status\", \"I have a hard time hearing out of my right ear\", or \"Do not touch me to wake me up as it startles  me\".  Outcome: Progressing  Goal: Absence of Hospital-Acquired Illness or Injury  Outcome: Progressing  Goal: Optimal Comfort and Wellbeing  Outcome: Progressing  Intervention: Monitor Pain and Promote Comfort  Recent Flowsheet Documentation  Taken 1/30/2025 0900 by Amina Evangelista RN  Pain Management Interventions:   medication (see MAR)   cold applied  Goal: Readiness for " Transition of Care  Outcome: Progressing     Problem: Skin Injury Risk Increased  Goal: Skin Health and Integrity  Outcome: Progressing  Intervention: Plan: Nurse Driven Intervention: Moisture Management  Recent Flowsheet Documentation  Taken 1/30/2025 0900 by Amina Evangelista RN  Moisture Interventions:   Incontinence pad   Urinary collection device  Taken 1/30/2025 0800 by Amina Evangelista RN  Moisture Interventions:   Incontinence pad   Urinary collection device  Bathing/Skin Care: incontinence care  Intervention: Plan: Nurse Driven Intervention: Friction and Shear  Recent Flowsheet Documentation  Taken 1/30/2025 0900 by Amina Evangelista RN  Friction/Shear Interventions: HOB 30 degrees or less     Problem: Comorbidity Management  Goal: Blood Glucose Levels Within Targeted Range  Outcome: Progressing  Goal: Blood Pressure in Desired Range  Outcome: Progressing

## 2025-01-30 NOTE — PLAN OF CARE
"19:00-07:30    Alert and oriented. Complained of pain PRN Oxycodone given. Incontinent of urine, Purewick in place. Plan for TCU today ride scheduled at 13:10-13:50     Goal Outcome Evaluation:      Plan of Care Reviewed With: patient    Overall Patient Progress: improvingOverall Patient Progress: improving    Outcome Evaluation: going to TCU placement today      Problem: Adult Inpatient Plan of Care  Goal: Plan of Care Review  Description: The Plan of Care Review/Shift note should be completed every shift.  The Outcome Evaluation is a brief statement about your assessment that the patient is improving, declining, or no change.  This information will be displayed automatically on your shift  note.  Outcome: Progressing  Flowsheets (Taken 1/30/2025 0302)  Outcome Evaluation: going to TCU placement today  Plan of Care Reviewed With: patient  Overall Patient Progress: improving  Goal: Patient-Specific Goal (Individualized)  Description: You can add care plan individualizations to a care plan. Examples of Individualization might be:  \"Parent requests to be called daily at 9am for status\", \"I have a hard time hearing out of my right ear\", or \"Do not touch me to wake me up as it startles  me\".  Outcome: Progressing  Goal: Absence of Hospital-Acquired Illness or Injury  Outcome: Progressing  Goal: Optimal Comfort and Wellbeing  Outcome: Progressing  Goal: Readiness for Transition of Care  Outcome: Progressing     Problem: Skin Injury Risk Increased  Goal: Skin Health and Integrity  Outcome: Progressing  Intervention: Plan: Nurse Driven Intervention: Moisture Management  Recent Flowsheet Documentation  Taken 1/29/2025 2101 by Megan Moffett RN  Moisture Interventions: Incontinence pad  Incontinence Protocol in Use: Active  Skin Appearance: Other (see comments)  Frequency of Application: with each episode of incontinence  Plan: Moisture Management:   incontinence pad   urinary collection device     Problem: Comorbidity " Management  Goal: Blood Glucose Levels Within Targeted Range  Outcome: Progressing  Goal: Blood Pressure in Desired Range  Outcome: Progressing

## 2025-01-30 NOTE — PROGRESS NOTES
Orthopedic Surgery  Chasity Hodgson  01/30/2025     Admit Date:  1/25/2025  POD: 4 Days Post-Op   Procedure(s):  Open reduction internal fixation left periprosthetic humerus fracture with stem retention    Patient resting comfortably in bed this morning with nursing at bedside. States pain is continuing to improve.   Tolerating oral intake.    Denies nausea or vomiting.  Denies chest pain or shortness of breath.  VSS, afebrile     Temp:  [98.1  F (36.7  C)-99.1  F (37.3  C)] 98.5  F (36.9  C)  Pulse:  [62-80] 77  Resp:  [16-20] 18  BP: ()/(45-70) 163/50  SpO2:  [93 %-100 %] 99 %    Alert and oriented.   Dressing is clean, dry, and intact. Ill fitting sling.   Minimal erythema of the surrounding skin. Scattered ecchymosis moderately surrounding surgical site, improving.    Left upper extremity is NVI.  Able to flex, extend, abduct, and adduct digits.  Radial pulse palpable.  Digits are warm and well-perfused.  Sensation intact to light touch.    Labs:  Recent Labs   Lab Test 01/28/25  0742 01/27/25  0611 01/25/25  0953   WBC 7.9 10.3 8.9   HGB 8.7* 8.6* 12.1    167 151     Recent Labs   Lab Test 02/23/18  0625   INR 0.98     Recent Labs   Lab Test 08/01/18  1152   CRP 7.2       A/P    1.   DVT prophylaxis: Aspirin 81mg daily for 3 weeks    Mobilize with PT/OT   Non weight bearing. Remain in shoulder sling at all times, OK to remove for hygiene and skin assessment. Sling to be readjusted when patient sitting up. OK to loosen to exercise elbow, wrist and fingers.    Continue current pain regimen, utilize Robaxin    Dressings: Keep intact.  Discussed with patient that is okay to remove dressing POD#3 or can leave in place.     Follow-up: 2 weeks post-op with Dr. Myrick     2. Disposition   Anticipate d/c to TCU when medically cleared and progressing in PT.      [facility]

## 2025-01-31 VITALS
BODY MASS INDEX: 37.66 KG/M2 | OXYGEN SATURATION: 99 % | HEART RATE: 75 BPM | HEIGHT: 63 IN | WEIGHT: 212.52 LBS | DIASTOLIC BLOOD PRESSURE: 58 MMHG | SYSTOLIC BLOOD PRESSURE: 141 MMHG | TEMPERATURE: 98.4 F | RESPIRATION RATE: 18 BRPM

## 2025-01-31 LAB
ANION GAP SERPL CALCULATED.3IONS-SCNC: 11 MMOL/L (ref 7–15)
BASOPHILS # BLD AUTO: 0 10E3/UL (ref 0–0.2)
BASOPHILS NFR BLD AUTO: 0 %
BUN SERPL-MCNC: 44.2 MG/DL (ref 8–23)
CALCIUM SERPL-MCNC: 9.2 MG/DL (ref 8.8–10.4)
CHLORIDE SERPL-SCNC: 100 MMOL/L (ref 98–107)
CREAT SERPL-MCNC: 1.24 MG/DL (ref 0.51–0.95)
EGFRCR SERPLBLD CKD-EPI 2021: 44 ML/MIN/1.73M2
EOSINOPHIL # BLD AUTO: 0.2 10E3/UL (ref 0–0.7)
EOSINOPHIL NFR BLD AUTO: 3 %
ERYTHROCYTE [DISTWIDTH] IN BLOOD BY AUTOMATED COUNT: 12.5 % (ref 10–15)
GLUCOSE BLDC GLUCOMTR-MCNC: 108 MG/DL (ref 70–99)
GLUCOSE BLDC GLUCOMTR-MCNC: 127 MG/DL (ref 70–99)
GLUCOSE BLDC GLUCOMTR-MCNC: 192 MG/DL (ref 70–99)
GLUCOSE SERPL-MCNC: 150 MG/DL (ref 70–99)
HCO3 SERPL-SCNC: 23 MMOL/L (ref 22–29)
HCT VFR BLD AUTO: 29.7 % (ref 35–47)
HGB BLD-MCNC: 9.9 G/DL (ref 11.7–15.7)
IMM GRANULOCYTES # BLD: 0.1 10E3/UL
IMM GRANULOCYTES NFR BLD: 1 %
LYMPHOCYTES # BLD AUTO: 2 10E3/UL (ref 0.8–5.3)
LYMPHOCYTES NFR BLD AUTO: 24 %
MCH RBC QN AUTO: 31.1 PG (ref 26.5–33)
MCHC RBC AUTO-ENTMCNC: 33.3 G/DL (ref 31.5–36.5)
MCV RBC AUTO: 93 FL (ref 78–100)
MONOCYTES # BLD AUTO: 0.9 10E3/UL (ref 0–1.3)
MONOCYTES NFR BLD AUTO: 11 %
NEUTROPHILS # BLD AUTO: 5.3 10E3/UL (ref 1.6–8.3)
NEUTROPHILS NFR BLD AUTO: 62 %
NRBC # BLD AUTO: 0 10E3/UL
NRBC BLD AUTO-RTO: 0 /100
PLATELET # BLD AUTO: 208 10E3/UL (ref 150–450)
POTASSIUM SERPL-SCNC: 6.1 MMOL/L (ref 3.4–5.3)
RBC # BLD AUTO: 3.18 10E6/UL (ref 3.8–5.2)
SODIUM SERPL-SCNC: 134 MMOL/L (ref 135–145)
WBC # BLD AUTO: 8.6 10E3/UL (ref 4–11)

## 2025-01-31 PROCEDURE — 85004 AUTOMATED DIFF WBC COUNT: CPT | Performed by: INTERNAL MEDICINE

## 2025-01-31 PROCEDURE — 36415 COLL VENOUS BLD VENIPUNCTURE: CPT | Performed by: INTERNAL MEDICINE

## 2025-01-31 PROCEDURE — 99238 HOSP IP/OBS DSCHRG MGMT 30/<: CPT

## 2025-01-31 PROCEDURE — 250N000013 HC RX MED GY IP 250 OP 250 PS 637: Performed by: STUDENT IN AN ORGANIZED HEALTH CARE EDUCATION/TRAINING PROGRAM

## 2025-01-31 PROCEDURE — 80048 BASIC METABOLIC PNL TOTAL CA: CPT | Performed by: INTERNAL MEDICINE

## 2025-01-31 PROCEDURE — 250N000013 HC RX MED GY IP 250 OP 250 PS 637: Performed by: INTERNAL MEDICINE

## 2025-01-31 PROCEDURE — 250N000013 HC RX MED GY IP 250 OP 250 PS 637: Performed by: HOSPITALIST

## 2025-01-31 RX ADMIN — SENNOSIDES AND DOCUSATE SODIUM 1 TABLET: 50; 8.6 TABLET ORAL at 10:15

## 2025-01-31 RX ADMIN — OXYCODONE HYDROCHLORIDE 2.5 MG: 5 TABLET ORAL at 11:07

## 2025-01-31 RX ADMIN — ASPIRIN 81 MG: 81 TABLET, COATED ORAL at 10:15

## 2025-01-31 RX ADMIN — FAMOTIDINE 20 MG: 20 TABLET, FILM COATED ORAL at 10:15

## 2025-01-31 RX ADMIN — OXYCODONE HYDROCHLORIDE 5 MG: 5 TABLET ORAL at 03:52

## 2025-01-31 RX ADMIN — LOSARTAN POTASSIUM 50 MG: 50 TABLET, FILM COATED ORAL at 10:16

## 2025-01-31 RX ADMIN — PANTOPRAZOLE SODIUM 40 MG: 40 TABLET, DELAYED RELEASE ORAL at 10:25

## 2025-01-31 RX ADMIN — ACETAMINOPHEN 975 MG: 325 TABLET, FILM COATED ORAL at 11:07

## 2025-01-31 RX ADMIN — ACETAMINOPHEN 975 MG: 325 TABLET, FILM COATED ORAL at 03:52

## 2025-01-31 RX ADMIN — METHOCARBAMOL 500 MG: 500 TABLET ORAL at 11:03

## 2025-01-31 RX ADMIN — LEVOTHYROXINE SODIUM 112 MCG: 0.11 TABLET ORAL at 10:15

## 2025-01-31 RX ADMIN — CARVEDILOL 12.5 MG: 6.25 TABLET, FILM COATED ORAL at 10:16

## 2025-01-31 RX ADMIN — INSULIN GLARGINE 10 UNITS: 100 INJECTION, SOLUTION SUBCUTANEOUS at 10:25

## 2025-01-31 ASSESSMENT — ACTIVITIES OF DAILY LIVING (ADL)
ADLS_ACUITY_SCORE: 65
ADLS_ACUITY_SCORE: 69
ADLS_ACUITY_SCORE: 65
ADLS_ACUITY_SCORE: 69
ADLS_ACUITY_SCORE: 65
ADLS_ACUITY_SCORE: 69
ADLS_ACUITY_SCORE: 65
ADLS_ACUITY_SCORE: 69
ADLS_ACUITY_SCORE: 65
ADLS_ACUITY_SCORE: 65

## 2025-01-31 NOTE — PLAN OF CARE
"Vitals are Temp: 98.4  F (36.9  C) Temp src: Oral BP: (!) 141/58 Pulse: 75   Resp: 18 SpO2: 99 %.  Patient is Alert and Oriented x4. They are 1 Assist with Gait Belt and Walker.  Patient is on Contact precuations for MRSA.  Pt is a Regular diet.  They are complaining of 8/10 pain in their left arm.  Oxycodone and robaxin  given for pain.  Patient is Saline locked. Plan to discharge to [facility].       Problem: Adult Inpatient Plan of Care  Goal: Plan of Care Review  Description: The Plan of Care Review/Shift note should be completed every shift.  The Outcome Evaluation is a brief statement about your assessment that the patient is improving, declining, or no change.  This information will be displayed automatically on your shift  note.  Outcome: Progressing  Goal: Patient-Specific Goal (Individualized)  Description: You can add care plan individualizations to a care plan. Examples of Individualization might be:  \"Parent requests to be called daily at 9am for status\", \"I have a hard time hearing out of my right ear\", or \"Do not touch me to wake me up as it startles  me\".  Outcome: Progressing  Goal: Absence of Hospital-Acquired Illness or Injury  Outcome: Progressing  Intervention: Identify and Manage Fall Risk  Recent Flowsheet Documentation  Taken 1/31/2025 1107 by Anamika Montes De Oca RN  Safety Promotion/Fall Prevention: activity supervised  Intervention: Prevent Skin Injury  Recent Flowsheet Documentation  Taken 1/31/2025 1107 by Anamika Montes De Oca RN  Body Position: position changed independently  Intervention: Prevent and Manage VTE (Venous Thromboembolism) Risk  Recent Flowsheet Documentation  Taken 1/31/2025 1107 by Anamika Montes De Oca RN  VTE Prevention/Management: SCDs off (sequential compression devices)  Goal: Optimal Comfort and Wellbeing  Outcome: Progressing  Intervention: Monitor Pain and Promote Comfort  Recent Flowsheet Documentation  Taken 1/31/2025 1107 by Anamika Montes De Oca RN  Pain Management " Interventions: medication (see MAR)  Goal: Readiness for Transition of Care  Outcome: Progressing     Problem: Skin Injury Risk Increased  Goal: Skin Health and Integrity  Outcome: Progressing  Intervention: Plan: Nurse Driven Intervention: Moisture Management  Recent Flowsheet Documentation  Taken 1/31/2025 1107 by Anamika Montes De Oca RN  Plan: Moisture Management: incontinence pad  Intervention: Plan: Nurse Driven Intervention: Friction and Shear  Recent Flowsheet Documentation  Taken 1/31/2025 1107 by Anamika Montes De Oca RN  Friction/Shear Interventions: HOB 30 degrees or less  Intervention: Optimize Skin Protection  Recent Flowsheet Documentation  Taken 1/31/2025 1107 by Anamika Montes De Oca RN  Pressure Reduction Techniques: weight shift assistance provided  Activity Management: activity adjusted per tolerance  Head of Bed (HOB) Positioning: HOB at 20-30 degrees     Problem: Comorbidity Management  Goal: Blood Glucose Levels Within Targeted Range  Outcome: Progressing  Intervention: Monitor and Manage Glycemia  Recent Flowsheet Documentation  Taken 1/31/2025 1107 by Anamika Montes De Oca RN  Medication Review/Management: medications reviewed  Goal: Blood Pressure in Desired Range  Outcome: Progressing  Intervention: Maintain Blood Pressure Management  Recent Flowsheet Documentation  Taken 1/31/2025 1107 by Anamika Montes De Oca RN  Medication Review/Management: medications reviewed

## 2025-01-31 NOTE — DISCHARGE SUMMARY
"[facility]  Hospitalist Discharge Summary      Date of Admission:  1/25/2025  Date of Discharge:  1/31/2025  Discharging Provider: TALYA Lancaster PA-C  Discharge Service: Hospitalist Service    Discharge Diagnoses   Mechanical fall complicated by left periprosthetic humeral fracture  Orthostatic hypotension    Clinically Significant Risk Factors     # DMII: A1C = N/A within past 6 months    # Obesity: Estimated body mass index is 37.65 kg/m  as calculated from the following:    Height as of this encounter: 1.6 m (5' 3\").    Weight as of this encounter: 96.4 kg (212 lb 8.4 oz).       Follow-ups Needed After Discharge   Follow-up Appointments       Follow Up and recommended labs and tests      Please call as soon as possible to make an appointment to be seen in Dr. Erich Myrick's clinic at 2 weeks postop for a recheck of your surgical site, possible repeat x-rays, and wound care. Please contact Dr. Myrick's team at [phone] to schedule an appointment.       Dr. Myrick sees patients at 2 clinic locations:  [facility] [place] [id number] [place] [address] Orthopedics [address], [address], [place] [address]      Please call the on-call phone number [phone] during evenings, nights and weekends for any urgent needs.              Discharge Disposition   Discharged to rehabilitation facility  Condition at discharge: Stable    Hospital Course   Chasity Hodgson is a 79 year old woman who was admitted on 1/25/2025. PMH significant for nonocclusive CAD, hyperlipidemia, hypertension, PAD, diabetes mellitus, hyperlipidemia, hypothyroidism, history of DVT not on blood thinners, ANNETTE who presents with fall, arm pain and periprosthetic left humeral fracture.    Assumed patient care today.  Patient's dizziness has improved.  Orthostatics are negative this morning.  Still had mild dizziness with ambulation from the bathroom but overall " doing well. No dizziness at rest, only occasionally upon standing.  Still having left shoulder pain but is controlled with pain regimen.  Denies any chest pain, shortness of breath, abdominal pain, nausea vomiting diarrhea.  Tolerating oral intake.  Encourage p.o. intake.  Patient will discharge to TCU this afternoon.  Educated patient and daughter on return precautions.  Answered all questions prior to discharge and patient felt comfortable with the discharge plan.     POD #4 ORIF left proximal humerus  Mechanical fall complicated by left periprosthetic humeral fracture  Orthostatic hypotension - resolved     - patient lives in a [facility] by herself      - she states that she was putting a flowerpot down when she lost her balance and fell against her walker onto her left side    - X-ray/CT of the humerus showed an angulated periprosthetic fracture in the proximal shaft of the left humerus    - s/p ORIF    - pain control    - OT/PT eval: will need TCU.  Patient was planned for discharge 1/30, however she was orthostatic and dizzy 1/30 morning with systolic BP in 80s.  Hemoglobin rechecked and stable.  Encouraging p.o. fluids and will monitor orthostatic blood pressures.  Planning to monitor on telemetry overnight to ensure no arrhythmias contributing to lightheadedness.  Decreasing losartan from 100 mg to 50 mg daily (formulary alternative to Benicar).  Will tentatively continue PTA carvedilol.       Diabetes mellitus    - resume Lantus 10 units in the morning     - cont ISS    - continue prandial Novolog.    - cont jardiance     Non-obstructive CAD, HTN, HL    -Planning to reassess orthostatic blood pressures in the morning.  Decreasing losartan from 100 mg to 50 mg daily (formulary alternative to Benicar).  Will tentatively continue PTA carvedilol.  If remains symptomatic or with recurrent hypotension 1/31 morning, can consider further adjustments.     - continue fenofibrate and zetia     CKD    - about at her  baseline (1.19 today)    - encouraged PO intake    - monitor     Hypothyroidism    - continue synthroid     GERD    - continue PPI and pepcid     Obesity, ANNETTE    - CPAP    - complicates cares     Consultations This Hospital Stay   ORTHOPEDIC SURGERY IP CONSULT  CARE MANAGEMENT / SOCIAL WORK IP CONSULT  OCCUPATIONAL THERAPY ADULT IP CONSULT  CARE MANAGEMENT / SOCIAL WORK IP CONSULT  PHYSICAL THERAPY ADULT IP CONSULT  OCCUPATIONAL THERAPY ADULT IP CONSULT  PHYSICAL THERAPY ADULT IP CONSULT  OCCUPATIONAL THERAPY ADULT IP CONSULT  PHYSICAL THERAPY ADULT IP CONSULT    Code Status   Prior    Time Spent on this Encounter   ITALYA PA-C, personally saw the patient today and spent less than or equal to 30 minutes discharging this patient.       TALYA Lancaster PA-C  [facility] OBSERVATION DEPT  [address] [facility] [address]  Phone: [phone]  ______________________________________________________________________    Physical Exam   Vital Signs: Temp: 98.4  F (36.9  C) Temp src: Oral BP: (!) 141/58 Pulse: 75   Resp: 18 SpO2: 99 % O2 Device: None (Room air)    Weight: 212 lbs 8.38 oz    GENERAL:  Alert, comfortable, No acute distress. Ambulating to bed from bathroom with walker  PSYCH: pleasant, oriented.  HEENT:  Normocephalic, No scleral icterus or conjunctival injection  HEART:  Normal S1, S2 with a murmur, RRR  LUNGS:  Normal Respiratory effort. Clear to auscultation anteriorly bilaterally with no wheezing, rales or ronchi.  SKIN:  Warm, dry to touch. No rash.  NEUROLOGIC: Speech clear, alert, answering questions appropriately, no focal deficits.     Primary Care Physician   Shola Benoit MD    Discharge Orders      Primary Care - Care Coordination Referral      Med Therapy Management Referral      General info for SNF    Length of Stay Estimate: Short Term Care: Estimated # of Days <30  Condition at Discharge: Good  Level of care:skilled   Rehabilitation Potential:  Good  Admission H&P remains valid and up-to-date: Yes  Recent Chemotherapy: N/A  Use Nursing Home Standing Orders: Yes     Mantoux instructions    Give two-step Mantoux (PPD) Per Facility Policy Yes     Wound care (specify)    Site:   left arm   Instructions:  Do not submerge in water. Cover for showers. Change if >60% saturation.     Follow Up and recommended labs and tests    Please call as soon as possible to make an appointment to be seen in Dr. Erich Myrick's clinic at 2 weeks postop for a recheck of your surgical site, possible repeat x-rays, and wound care. Please contact Dr. Myrick's team at [phone] to schedule an appointment.       Dr. Myrick sees patients at 2 clinic locations:  [facility] [place] [id number] [place] [address] Orthopedics [address], [address], [place] [address]      Please call the on-call phone number [phone] during evenings, nights and weekends for any urgent needs.     Weight bearing status    NWB in sling, She may do gentle range of motion of the elbow wrist and shoulder with pendulums only     Reason for your hospital stay    Periprosthetic fracture, left humerus s/p ORIF (DOS: 1/26/2025)     Activity - Up with nursing assistance     Activity - Up with assistive device     Encourage PO fluids     Weight bearing status    NWB L arm     Physical Therapy Adult Consult    Evaluate and treat as clinically indicated.    Reason:  Periprosthetic fracture, left humerus s/p ORIF (DOS: 1/26/2025)     Occupational Therapy Adult Consult    Evaluate and treat as clinically indicated.    Reason:  Periprosthetic fracture, left humerus s/p ORIF (DOS: 1/26/2025)     Physical Therapy Adult Consult    Evaluate and treat as clinically indicated.    Reason:  L humerus fracture     Occupational Therapy Adult Consult    Evaluate and treat as clinically indicated.    Reason:  L humerus fracture     Contact Isolation    MRSA      Fall precautions     Fall precautions     Diet    Follow this diet upon discharge: Regular       Significant Results and Procedures   Results for orders placed or performed during the hospital encounter of 01/25/25   Humerus XR,  G/E 2 views, left    Narrative    EXAM: XR HUMERUS LEFT G/E 2 VIEWS  LOCATION: [facility]  DATE: 1/25/2025    INDICATION: Status post fall. Left shoulder replacement.  COMPARISON: 8/17/2024      Impression    IMPRESSION: An angulated periprosthetic fracture is seen in the proximal shaft of the left humerus with medial apex angulation of fracture fragments.    CT Shoulder Left w/o Contrast    Narrative    EXAM: CT SHOULDER LEFT WITHOUT CONTRAST  LOCATION: [facility]  DATE: 01/25/2025    INDICATION: Pain after fall.  COMPARISON: X-ray 01/25/2025.  TECHNIQUE: Noncontrast. Axial, sagittal and coronal thin-section reconstruction. Dose reduction techniques were used.     FINDINGS:     BONES:  -Postoperative changes left shoulder arthroplasty. There is a fracture of the proximal humerus which begins at the humeral neck and extends distally beyond the tip of the humeral endoprosthesis, extending beyond the tip of the prosthesis a distance of   approximately 4.5 cm. [place] medial angulation deformity. No dislocation. The AC joint is intact with degenerative change and some chronic-appearing irregularity of the tip of the acromion. This does not appear acute. The scapula is intact.    SOFT TISSUES:  -There is some edema within the soft tissues lateral to the deltoid and enlargement and likely hemorrhagic blood product within the proximal triceps musculature but no organized hematoma.      Impression    IMPRESSION:  1.  Postoperative changes of left shoulder arthroplasty.  2.  Fracture of the left humerus beginning at the humeral neck and extending distally beyond the tip of the humeral endoprosthesis a distance of 4.5 cm.  3.  [place] medial angulation  deformity at the fracture site.  4.  No dislocation.  5.  Degenerative change at the AC joint.  6.  Edema within the soft tissues superficial to the deltoid.  7.  Enlargement of the triceps likely secondary to a component of inflammation and hemorrhagic blood product but no organized hematoma.       XR Surgery LUBNA L/T 5 Min Fluoro w Stills    Narrative    This exam was marked as non-reportable because it will not be read by a   radiologist or a [facility] non-radiologist provider.         XR Humerus Port Left G/E 2 Views    Narrative    EXAM: XR HUMERUS PORTABLE LEFT G/E 2 VIEWS  LOCATION: [facility]  DATE: 01/26/2025    INDICATION: Postop x-ray.  COMPARISON: 01/25/2024.      Impression    IMPRESSION: Improved alignment of the periprosthetic left humeral shaft fracture status post plate and screw fixation. Glenohumeral implant is again seen. Bones are demineralized.      XR Hand Port Left G/E 3 Views    Narrative    EXAM: XR HAND PORTABLE LEFT G/E 3 VIEWS, XR FOREARM PORTABLE LEFT 2 VIEWS  LOCATION: [facility]  DATE: 01/26/2025    INDICATION: Hand pain.  COMPARISON: None.      Impression    IMPRESSION: Bones are demineralized. There is central erosive change and advanced arthrosis involving DIP joints of the middle, ring and to a lesser degree small fingers, finding which can be seen with erosive osteoarthritis and appears to be chronic.   There is no evidence of an acute displaced left hand fracture. Dorsal soft tissue swelling.    The left forearm is intact without evidence of an acute displaced fracture or malalignment.     XR Forearm Port Left 2 Views    Narrative    EXAM: XR HAND PORTABLE LEFT G/E 3 VIEWS, XR FOREARM PORTABLE LEFT 2 VIEWS  LOCATION: [facility]  DATE: 01/26/2025    INDICATION: Hand pain.  COMPARISON: None.      Impression    IMPRESSION: Bones are demineralized. There is central erosive change and advanced arthrosis involving DIP  joints of the middle, ring and to a lesser degree small fingers, finding which can be seen with erosive osteoarthritis and appears to be chronic.   There is no evidence of an acute displaced left hand fracture. Dorsal soft tissue swelling.    The left forearm is intact without evidence of an acute displaced fracture or malalignment.       *Note: Due to a large number of results and/or encounters for the requested time period, some results have not been displayed. A complete set of results can be found in Results Review.       Discharge Medications   Discharge Medication List as of 1/31/2025  1:10 PM        CONTINUE these medications which have CHANGED    Details   acetaminophen (TYLENOL) 325 MG tablet Take 3 tablets (975 mg) by mouth every 8 hours., Transitional      aspirin 81 MG EC tablet Take 1 tablet (81 mg) by mouth daily., Transitional      oxyCODONE (ROXICODONE) 5 MG tablet Take 1 tablet (5 mg) by mouth every 4 hours as needed for severe pain., Disp-20 tablet, R-0, Local Print      senna-docusate (SENOKOT-S/PERICOLACE) 8.6-50 MG tablet Take 1 tablet by mouth 2 times daily as needed for constipation., Transitional           CONTINUE these medications which have NOT CHANGED    Details   amLODIPine (NORVASC) 2.5 MG tablet Take by mouth daily as needed (systolic blood pressure > 140 mmHg). Take 0.5 tablets (1.25 mg) as needed prior to MD appointments for systolic blood pressure > 140 mmHg. Take an additional 0.5 tablets (1.25 mg) if systolic blood pressure is still elevate d., Historical      BIOTIN PO Take 1 capsule by mouth at bedtime Unknown dose, Historical      calcium citrate 250 MG TABS Take 1 tablet by mouth at bedtime, Historical      carvedilol (COREG) 12.5 MG tablet Take 1 tablet (12.5 mg) by mouth 2 times daily (with meals), Disp-180 tablet, R-2, E-Prescribe      clotrimazole (LOTRIMIN) 1 % cream Apply topically as needed (rash/yeast infection)Historical      cyanocobalamin (VITAMIN B-12) 1000 MCG  tablet Take 1,000 mcg by mouth every evening, Historical      empagliflozin (JARDIANCE) 10 MG TABS tablet Take 1 tablet (10 mg) by mouth every evening., Disp-90 tablet, R-3, E-Prescribe      ezetimibe (ZETIA) 10 MG tablet Take 1 tablet (10 mg) by mouth every evening., Disp-90 tablet, R-3, E-Prescribe      famotidine (PEPCID) 40 MG tablet Take 40 mg by mouth daily., Historical      fexofenadine (ALLEGRA) 180 MG tablet Take 180 mg by mouth daily (with lunch) Takes in the afternoon, Disp-120, R-0, Historical      furosemide (LASIX) 20 MG tablet Take 1 tablet (20 mg) by mouth daily as needed (lower extremity edema)., Historical      icosapent ethyl (VASCEPA) 1 g CAPS capsule Take 2 g by mouth every evening., Disp-180 capsule, R-3, E-Prescribe      insulin aspart (NOVOLOG PEN) 100 UNIT/ML pen Inject 10 Units subcutaneously daily (with breakfast) AND 12 Units daily (before supper)., Disp-15 mL, R-4, E-Prescribe      insulin glargine 100 UNIT/ML pen Inject 10 Units subcutaneously every morning., Disp-15 mL, R-4, E-PrescribeIf Lantus is not covered by insurance, may substitute Basaglar or Semglee or other insulin glargine product per insurance preference at same dose and frequency.        ipratropium (ATROVENT) 0.03 % nasal spray Spray 1 spray in nostril every 8 hours as needed, Historical      ketoconazole (NIZORAL) 2 % external cream Apply topically 2 times daily as neededHistorical      Lidocaine (LIDOCARE) 4 % Patch Place 1 patch onto the skin as needed To prevent lidocaine toxicity, patient should be patch free for 12 hrs daily.Historical      methocarbamol (ROBAXIN) 500 MG tablet Take 1-2 tablets (500-1,000 mg) by mouth 4 times daily as needed for muscle spasms., Disp-30 tablet, R-0, E-Prescribe      minoxidil (LONITEN) 2.5 MG tablet Take 0.25 tablets by mouth twice a week On Monday and Thursday, Historical      Multiple vitamin TABS Take 1 tablet by mouth daily, Historical      olmesartan (BENICAR) 40 MG tablet Take  1 tablet (40 mg) by mouth daily., Disp-90 tablet, R-3, E-Prescribe      ondansetron (ZOFRAN) 4 MG tablet Take 1 tablet (4 mg) by mouth every 6 hours as needed Reported on 3/20/2017, Disp-20 tablet, R-0, E-Prescribe      pantoprazole (PROTONIX) 40 MG EC tablet Take 40 mg by mouth 2 times daily, Historical      Polyethylene Glycol 400 (BLINK TEARS) 0.25 % GEL Place 1 drop into both eyes at bedtime, Historical      sucralfate (CARAFATE) 1 GM/10ML suspension Take 1 g by mouth 4 times daily as needed (GERD), Historical      SYNTHROID 112 MCG tablet Take 1 tablet (112 mcg) by mouth daily., Disp-90 tablet, R-3, ALEXIA, E-Prescribe      tretinoin (RETIN-A) 0.025 % external cream Apply topically nightly as needed (facial lesions) Use every night as tolerated - spot treat lesionHistorical      triamcinolone (KENALOG) 0.025 % external ointment Apply topically 2 times daily as needed for irritationHistorical      Vitamin D3 50 mcg (2000 units) tablet Take 1 tablet by mouth every evening Takes in the afternoon, Historical      blood glucose (NO BRAND SPECIFIED) test strip Use to test blood sugar 3 times daily or as directed., Disp-200 strip, R-3, E-Prescribe      Continuous Glucose Sensor (FREESTYLE BRANDY 3 PLUS SENSOR) MISC 1 each once for 1 dose. Every 15 days, Disp-6 each, R-3, E-Prescribe      fenofibrate (TRICOR) 145 MG tablet Take 1 tablet (145 mg) by mouth at bedtime., Disp-90 tablet, R-3, E-Prescribe      nitroGLYcerin (NITROSTAT) 0.4 MG sublingual tablet Place 1 tablet (0.4 mg) under the tongue every 5 minutes as needed for chest pain (no more than 3 in one hour; after 3rd, call 911.), Disp-25 tablet, R-3, E-Prescribe           Allergies   Allergies   Allergen Reactions    Ketorolac Tromethamine Difficulty breathing     Shortness of breath to tablets only per the patient    Nsaids Difficulty breathing     Increased creatinine    Celecoxib Itching and Rash    Morphine And Codeine Itching     With higher doses. Pt denies  this allergy 11/16/2023    Codeine Itching    Conjugated Estrogens     Crestor [Rosuvastatin] Muscle Pain (Myalgia)    No Clinical Screening - See Comments Itching     Fragrance    Vioxx Other (See Comments)     Heart races    Sulfa Antibiotics Rash

## 2025-01-31 NOTE — PLAN OF CARE
19:00-07:30    Alert and oriented. No dizziness reported overnight. CMS intact LUE, sling in place.  Incontinent of urine. Blood sugar checked no coverage needed at bedtime. Plan TCU today pending medical readiness, ride booked for [id number].    Goal Outcome Evaluation:      Plan of Care Reviewed With: patient    Overall Patient Progress: improvingOverall Patient Progress: improving    Outcome Evaluation: no dizziness reported

## 2025-01-31 NOTE — PROGRESS NOTES
Care Management Discharge Note    Discharge Date: 01/31/2025     Discharge Disposition: Transitional Care    Discharge Services: None    Discharge DME: None    Discharge Transportation:  [facility] [id number]    Private pay costs discussed: transportation costs    Does the patient's insurance plan have a 3 day qualifying hospital stay waiver?  No    PAS Confirmation Code: [id number]  Patient/family educated on Medicare website which has current facility and service quality ratings: yes    Education Provided on the Discharge Plan: Yes  Persons Notified of Discharge Plans: Pt  Patient/Family in Agreement with the Plan:      Handoff Referral Completed: Yes, [facility] PCP: Internal handoff referral completed    Additional Information:  Pt discharging to [facility] via [facility] transport between [id number]. Orders completed and sent, script faxed and placed in hard chart. Spoke with pt who is in agreement, she reports dtr is aware.     Charley Morales RN BSN   Inpatient Care Coordination  [facility]   Phone [phone]

## 2025-01-31 NOTE — PROGRESS NOTES
[facility]    Hospitalist Progress Note      Assessment & Plan   Chasity Hodgson is a 79 year old woman who was admitted on 1/25/2025. PMH significant for nonocclusive CAD, hyperlipidemia, hypertension, PAD, diabetes mellitus, hyperlipidemia, hypothyroidism, history of DVT not on blood thinners, ANNETTE who presents with fall, arm pain and periprosthetic left humeral fracture     POD #4 ORIF left proximal humerus  Mechanical fall complicated by left periprosthetic humeral fracture    - patient lives in a [facility] by herself      - she states that she was putting a flowerpot down when she lost her balance and fell against her walker onto her left side    - X-ray/CT of the humerus showed an angulated periprosthetic fracture in the proximal shaft of the left humerus    - s/p ORIF    - pain control    - OT/PT eval: will need TCU.  Patient was planned for discharge 1/30, however she was orthostatic and dizzy 1/30 morning with systolic BP in 80s.  Hemoglobin rechecked and stable.  Encouraging p.o. fluids and will monitor orthostatic blood pressures.  Planning to monitor on telemetry overnight to ensure no arrhythmias contributing to lightheadedness.  Decreasing losartan from 100 mg to 50 mg daily (formulary alternative to Benicar).  Will tentatively continue PTA carvedilol.  If remains symptomatic or with recurrent hypotension 1/31 morning, can consider further adjustments. Hopeful for discharge to TCU 1/31.     Diabetes mellitus    - resume Lantus 10 units in the morning     - cont ISS    - continue prandial Novolog.    - cont jardiance     Non-obstructive CAD, HTN, HL    -Planning to reassess orthostatic blood pressures in the morning.  Decreasing losartan from 100 mg to 50 mg daily (formulary alternative to Benicar).  Will tentatively continue PTA carvedilol.  If remains symptomatic or with recurrent hypotension 1/31 morning, can consider further adjustments.     - continue fenofibrate and zetia      CKD    - about at her baseline (1.19 today)    - encouraged PO intake    - monitor     Hypothyroidism    - continue synthroid     GERD    - continue PPI and pepcid     Obesity, ANNETTE    - CPAP    - complicates cares     DVT Prophylaxis: Pneumatic Compression Devices  Code Status: Full Code  Medically Ready for Discharge: Anticipated Tomorrow  Expected discharge: Anticipate discharge to TCU tomorrow.    Floresita Roy MD FACP  Hospitalist Service  [facility]      Interval History   Patient was planned for discharge 1/30, however she was orthostatic and dizzy 1/30 morning with systolic BP in 80s.  Hemoglobin rechecked and stable.  Encouraging p.o. fluids and will monitor orthostatic blood pressures.  Planning to monitor on telemetry overnight to ensure no arrhythmias contributing to lightheadedness.  Decreasing losartan from 100 mg to 50 mg daily (formulary alternative to Benicar).  Will tentatively continue PTA carvedilol.  If remains symptomatic or with recurrent hypotension 1/31 morning, can consider further adjustments. Hopeful for discharge to TCU 1/31.    -Data reviewed today: I reviewed all new labs and imaging results over the last 24 hours.       Physical Exam   Temp: 98.7  F (37.1  C) Temp src: Oral BP: (!) 153/54 Pulse: 81   Resp: 18 SpO2: 99 % O2 Device: None (Room air)    Vitals:    01/25/25 1422   Weight: 96.4 kg (212 lb 8.4 oz)     Vital Signs with Ranges  Temp:  [98.5  F (36.9  C)-99  F (37.2  C)] 98.7  F (37.1  C)  Pulse:  [67-81] 81  Resp:  [17-18] 18  BP: ()/(42-57) 153/54  SpO2:  [95 %-100 %] 99 %  I/O last 3 completed shifts:  In: -   Out: 800 [Urine:800]    Constitutional: Pleasant woman seen resting in bed.  Seen on the late afternoon after she had been sitting up in chair for couple hours without events.  Appears well at time of exam.  Alert and oriented x 3.  No acute distress.  Nontoxic.   HEENT: NCAT. EOMI. Moist oral mucosa.  Respiratory: Clear to auscultation  bilaterally. No crackles or wheezes.  Cardiovascular: Regular rate and rhythm. No murmur.  GI: Soft, nontender, nondistended. Normoactive bowel sounds.   Musculoskeletal: Left arm in sling.  Surgical dressing noted, clean and dry.  Patient with good movement of left fingers.  Ecchymosis noted to the left arm.  Neurologic: Alert and oriented x3. No focal neurologic deficits. Did not assess gait.      Medications   Current Facility-Administered Medications   Medication Dose Route Frequency Provider Last Rate Last Admin     Current Facility-Administered Medications   Medication Dose Route Frequency Provider Last Rate Last Admin    acetaminophen (TYLENOL) tablet 975 mg  975 mg Oral Q8H Erich Myrick MD   975 mg at 01/30/25 2109    aspirin EC tablet 81 mg  81 mg Oral Daily Erich Myrick MD   81 mg at 01/30/25 0855    carvedilol (COREG) tablet 12.5 mg  12.5 mg Oral BID w/meals Erich Myrick MD   12.5 mg at 01/30/25 1828    empagliflozin (JARDIANCE) tablet 10 mg  10 mg Oral QPM Erich Myrick MD   10 mg at 01/30/25 2108    ezetimibe (ZETIA) tablet 10 mg  10 mg Oral QPM Erich Myrick MD   10 mg at 01/30/25 2109    famotidine (PEPCID) tablet 20 mg  20 mg Oral Daily Romero Andrews MD   20 mg at 01/30/25 0855    fenofibrate (LOFIBRA) tablet 134 mg  134 mg Oral At Bedtime Erich Myrick MD   134 mg at 01/30/25 2108    insulin aspart (NovoLOG) injection (RAPID ACTING)  10 Units Subcutaneous Daily with breakfast Romero Andrews MD   10 Units at 01/30/25 0901    And    insulin aspart (NovoLOG) injection (RAPID ACTING)  12 Units Subcutaneous Daily before supper Romero Andrews MD   12 Units at 01/30/25 1830    insulin aspart (NovoLOG) injection (RAPID ACTING)  1-5 Units Subcutaneous At Bedtime MasonKathy PA-C   1 Units at 01/27/25 2210    insulin aspart (NovoLOG) injection (RAPID ACTING)  1-7 Units Subcutaneous TID AC Romero nAdrews MD   2 Units at 01/30/25 1829    insulin glargine  (LANTUS PEN) injection 10 Units  10 Units Subcutaneous Romero Stovall MD   10 Units at 01/30/25 0900    levothyroxine (SYNTHROID/LEVOTHROID) tablet 112 mcg  112 mcg Oral Daily Erich Myrick MD   112 mcg at 01/30/25 0856    losartan (COZAAR) tablet 100 mg  100 mg Oral Daily Erich Myrick MD   100 mg at 01/30/25 0855    pantoprazole (PROTONIX) EC tablet 40 mg  40 mg Oral BID Erich Myrick MD   40 mg at 01/30/25 2108    polyethylene glycol (MIRALAX) Packet 17 g  17 g Oral Daily Erich Myrick MD        senna-docusate (SENOKOT-S/PERICOLACE) 8.6-50 MG per tablet 1 tablet  1 tablet Oral BID Erich Myrick MD   1 tablet at 01/30/25 2108    tranexamic acid 1 g in 100 mL NS IV bag (premix)  1 g Intravenous Once Erich Myrick MD           Data   Recent Labs   Lab 01/30/25  2203 01/30/25  1752 01/30/25  1133 01/30/25  1043 01/28/25  0833 01/28/25  0742 01/27/25  0757 01/27/25  0611   WBC  --   --   --  7.9  --  7.9  --  10.3   HGB  --   --   --  9.1*  --  8.7*  --  8.6*   MCV  --   --   --  93  --  94  --  94   PLT  --   --   --  182  --  151  --  167   NA  --   --   --  134*  --  137  --  135   POTASSIUM  --   --   --  4.8  --  4.9  --  4.9   CHLORIDE  --   --   --  102  --  105  --  104   CO2  --   --   --  23  --  21*  --  23   BUN  --   --   --  38.0*  --  35.2*  --  32.6*   CR  --   --   --  1.19*  --  1.18*  --  1.37*   ANIONGAP  --   --   --  9  --  11  --  8   RINKU  --   --   --  8.7*  --  8.2*  --  8.2*   * 195* 110* 123*   < > 116*   < > 151*    < > = values in this interval not displayed.       No results found for this or any previous visit (from the past 24 hours).

## 2025-01-31 NOTE — PROGRESS NOTES
Orthopedic Surgery  Chasity Hodgson  01/31/2025     Admit Date:  1/25/2025  POD: 5 Days Post-Op   Procedure(s):  Open reduction internal fixation left periprosthetic humerus fracture with stem retention    Patient resting comfortably in bed. She had an episode of lightheadedness yesterday and had to lay down, otherwise no complaints.   Tolerating oral intake.    Denies nausea or vomiting.  Denies chest pain or shortness of breath.      Temp:  [98.5  F (36.9  C)-98.7  F (37.1  C)] 98.7  F (37.1  C)  Pulse:  [64-81] 64  Resp:  [17-18] 18  BP: ()/(42-57) 143/51  SpO2:  [97 %-100 %] 97 %    Alert and oriented.   Dressing is clean, dry, and intact. Tegaderm Sling in place  Minimal erythema of the surrounding skin. Scattered ecchymosis moderately surrounding surgical site, improving.    Left upper extremity is NVI.  Able to flex, extend, abduct, and adduct digits.  Radial pulse palpable.  Digits are warm and well-perfused.  Sensation intact to light touch.    Labs:  Recent Labs   Lab Test 01/30/25  1043 01/28/25  0742 01/27/25  0611   WBC 7.9 7.9 10.3   HGB 9.1* 8.7* 8.6*    151 167     Recent Labs   Lab Test 02/23/18  0625   INR 0.98     Recent Labs   Lab Test 08/01/18  1152   CRP 7.2       A/P    1.   DVT prophylaxis: Aspirin 81mg daily for 3 weeks    Mobilize with PT/OT   Non weight bearing. Remain in shoulder sling at all times, OK to remove for hygiene and skin assessment. Continue with sling   Continue current pain regimen, utilize Robaxin    Dressings: Keep intact.  okay to remove dressing or can leave in place.     Follow-up: 2 weeks post-op with Dr. Myrick     2. Disposition   Anticipate d/c to TCU when medically cleared and progressing in PT. Ortho stable.     Evelyn Espinoza PA-C  [facility]

## 2025-01-31 NOTE — PLAN OF CARE
Patient's After Visit Summary was reviewed with patient .   Patient verbalized understanding of After Visit Summary, recommended follow up and was given an opportunity to ask questions.   Discharge medications sent home with patient/family, No   Discharged with transport tech

## 2025-02-01 NOTE — PLAN OF CARE
Occupational Therapy Discharge Summary    Reason for therapy discharge:    Discharged to transitional care facility.    Progress towards therapy goal(s). See goals on Care Plan in [facility] electronic health record for goal details.  Goals partially met.  Barriers to achieving goals:   discharge from facility.    Therapy recommendation(s):    Continued therapy is recommended.  Rationale/Recommendations:  Patient presents significantly below baseline with ADLS and mobility. Pt will require TCU at discharge to return to prior level.

## 2025-02-02 ENCOUNTER — LAB REQUISITION (OUTPATIENT)
Dept: LAB | Facility: CLINIC | Age: 80
End: 2025-02-02
Payer: MEDICARE

## 2025-02-02 DIAGNOSIS — Z11.1 ENCOUNTER FOR SCREENING FOR RESPIRATORY TUBERCULOSIS: ICD-10-CM

## 2025-02-03 ENCOUNTER — TRANSITIONAL CARE UNIT VISIT (OUTPATIENT)
Dept: GERIATRICS | Facility: CLINIC | Age: 80
End: 2025-02-03
Payer: MEDICARE

## 2025-02-03 VITALS
TEMPERATURE: 97.2 F | DIASTOLIC BLOOD PRESSURE: 69 MMHG | OXYGEN SATURATION: 96 % | HEART RATE: 63 BPM | HEIGHT: 63 IN | WEIGHT: 207.8 LBS | SYSTOLIC BLOOD PRESSURE: 145 MMHG | BODY MASS INDEX: 36.82 KG/M2 | RESPIRATION RATE: 18 BRPM

## 2025-02-03 DIAGNOSIS — Z79.4 TYPE 2 DIABETES MELLITUS WITH CHRONIC KIDNEY DISEASE, WITH LONG-TERM CURRENT USE OF INSULIN, UNSPECIFIED CKD STAGE (H): ICD-10-CM

## 2025-02-03 DIAGNOSIS — G47.33 OSA (OBSTRUCTIVE SLEEP APNEA): ICD-10-CM

## 2025-02-03 DIAGNOSIS — D64.9 ACUTE ON CHRONIC ANEMIA: ICD-10-CM

## 2025-02-03 DIAGNOSIS — M97.8XXD: Primary | ICD-10-CM

## 2025-02-03 DIAGNOSIS — R53.81 PHYSICAL DECONDITIONING: ICD-10-CM

## 2025-02-03 DIAGNOSIS — N18.30 STAGE 3 CHRONIC KIDNEY DISEASE, UNSPECIFIED WHETHER STAGE 3A OR 3B CKD (H): ICD-10-CM

## 2025-02-03 DIAGNOSIS — E78.5 DYSLIPIDEMIA: ICD-10-CM

## 2025-02-03 DIAGNOSIS — I25.10 NON-OCCLUSIVE CORONARY ARTERY DISEASE: ICD-10-CM

## 2025-02-03 DIAGNOSIS — K21.9 GASTROESOPHAGEAL REFLUX DISEASE WITHOUT ESOPHAGITIS: ICD-10-CM

## 2025-02-03 DIAGNOSIS — Z87.81 S/P ORIF (OPEN REDUCTION INTERNAL FIXATION) FRACTURE: ICD-10-CM

## 2025-02-03 DIAGNOSIS — E11.22 TYPE 2 DIABETES MELLITUS WITH CHRONIC KIDNEY DISEASE, WITH LONG-TERM CURRENT USE OF INSULIN, UNSPECIFIED CKD STAGE (H): ICD-10-CM

## 2025-02-03 DIAGNOSIS — Z93.3 COLOSTOMY IN PLACE (H): ICD-10-CM
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
## 2025-02-03 DIAGNOSIS — Z98.890 S/P ORIF (OPEN REDUCTION INTERNAL FIXATION) FRACTURE: ICD-10-CM

## 2025-02-03 DIAGNOSIS — E66.812 CLASS 2 SEVERE OBESITY WITH SERIOUS COMORBIDITY AND BODY MASS INDEX (BMI) OF 36.0 TO 36.9 IN ADULT, UNSPECIFIED OBESITY TYPE (H): ICD-10-CM

## 2025-02-03 DIAGNOSIS — Z96.619: Primary | ICD-10-CM

## 2025-02-03 DIAGNOSIS — I10 PRIMARY HYPERTENSION: ICD-10-CM

## 2025-02-03 DIAGNOSIS — E66.01 CLASS 2 SEVERE OBESITY WITH SERIOUS COMORBIDITY AND BODY MASS INDEX (BMI) OF 36.0 TO 36.9 IN ADULT, UNSPECIFIED OBESITY TYPE (H): ICD-10-CM

## 2025-02-03 DIAGNOSIS — E03.9 HYPOTHYROIDISM, UNSPECIFIED TYPE: ICD-10-CM

## 2025-02-03 PROCEDURE — 36415 COLL VENOUS BLD VENIPUNCTURE: CPT

## 2025-02-03 PROCEDURE — 86481 TB AG RESPONSE T-CELL SUSP: CPT | Mod: ORL

## 2025-02-03 PROCEDURE — P9603 ONE-WAY ALLOW PRORATED MILES: HCPCS | Mod: ORL

## 2025-02-03 PROCEDURE — 99310 SBSQ NF CARE HIGH MDM 45: CPT | Performed by: NURSE PRACTITIONER

## 2025-02-03 NOTE — PATIENT INSTRUCTIONS
Orders  Chasity Hodgson  1945  1) Add end date of 3 weeks to aspirin order  2) CBC and BMP and TSH 2/4. dx: anemia and CKD and hypothyrodism vy upload his urine  3) Hold glargine for BS<120  4) Hold aspart for BS<100 or not eating    5)  Hold amlodipine.  6) Start compression stockings on in AM, off in PM  MAGALY Ferrera CNP on 2/3/2025 at 10:18 AM

## 2025-02-03 NOTE — PROGRESS NOTES
[facility] GERIATRICS    PRIMARY CARE PROVIDER AND CLINIC:  Shola Benoit MD, MD, [address] / [place] [address]  Chief Complaint   Patient presents with    Hospital F/U      [place] Medical Record Number:  [id number]  Place of Service where encounter took place:  [facility]  ([facility]) [[id number]]    Chasity Hodgson  is a 79 year old  (1945), admitted to the above facility from  [facility]. Hospital stay 1/25/2025 through 1/31/2025..   HPI:    Past medical history significant for nonobstructive CAD, hypertension, dyslipidemia, type 2 diabetes, CKD, colostomy in place, hypothyroidism, GERD, depression, obesity, ANNETTE    Summary of recent hospitalization:  Patient was hospitalized at [facility] from 1/25/2025 to 1/31/2025 for left periprosthetic humerus fracture secondary to a fall.  Patient presented to the emergency department for evaluation of arm pain after patient was bending to move something on the floor and lost her balance.  In the emergency department laboratory evaluation significant for creatinine 1.36, hemoglobin 11.3.  Shoulder CT revealed fracture of left humerus beginning of the humeral neck and extending distally beyond the tip of the humeral endoprosthesis, apex medial angulation deformity at the fracture site, no dislocation, degenerative change at the AC joint, edema within the soft tissues superficial to the deltoid, enlargement of triceps likely secondary to a component of inflammation and hemorrhagic blood products but no organized hematoma.  Ortho was consulted and patient's status post ORIF on 1/26/2025.  Patient to remain nonweightbearing with shoulder in all times.  Aspirin for DVT prophylaxis x 3 weeks.  Hemoglobin postoperatively down to 8.6.  Hospitalization complicated by episode of orthostatic and dizziness blood pressure, PTA losartan decreased, but patient discharged on home benicar dose.  Patient with hemolyzed  specimen on lab draw from 1/31, with hyperkalemia noted.Comment: Acute, secondary to recent hospitalization, medical conditions as above  Plan: Encourage participation in physical therapy/occupational therapy for strengthening and deconditioning. Discharge planning per their recommendation. Social work to assist with d/c planning.    Reviewed ER note, H&P, consultant notes, discharge summary, labs and imaging from recent hospital stay today.    Today patient was seen for admission visit in the TCU.  [facility] [place] [facility]  She is oriented x 3.  She rates her pain at time of visit 2/3.  She reports that overall pain has been managed on current regimen.  She denies any shortness of breath, chest pain, reports occasional dizziness that has been present since surgery.  She denies dysuria.  Reports bowels are moving regularly.  She does have lower extremity swelling and uses compression stockings at home.  We discussed what to expect in the TCU.    Reviewed facility EMR including medications, recent nursing progress notes, vital signs.  Discussed plan of care with nursing.      CODE STATUS/ADVANCE DIRECTIVES DISCUSSION: CPR/Full code   ALLERGIES:   Allergies   Allergen Reactions    Ketorolac Tromethamine Difficulty breathing     Shortness of breath to tablets only per the patient    Nsaids Difficulty breathing     Increased creatinine    Celecoxib Itching and Rash    Morphine And Codeine Itching     With higher doses. Pt denies this allergy 11/16/2023    Codeine Itching    Conjugated Estrogens     Crestor [Rosuvastatin] Muscle Pain (Myalgia)    No Clinical Screening - See Comments Itching     Fragrance    Vioxx Other (See Comments)     Heart races    Sulfa Antibiotics Rash      PAST MEDICAL HISTORY:   Past Medical History:   Diagnosis Date    Abdominal adhesions 1984, 96,99    s/p lysis    Acne rosacea     Allergic rhinitis     Alopecia     Anemia     CAD (coronary artery disease)     Carotid  stenosis     CKD (chronic kidney disease) stage 2, GFR 60-89 ml/min     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)     CPAP (continuous positive airway pressure) dependence     Depression     Diabetic gastroparesis (H)     DM2 (diabetes mellitus, type 2) (H)     DVT of axillary vein, acute right (H)     Fibromyalgia     GERD (gastroesophageal reflux disease)     Hernia of unspecified site of abdominal cavity without mention of obstruction or gangrene     bilateral    History of blood transfusion     10/ 1980    History of thrombophlebitis     HTN (hypertension)     Hypertriglyceridemia     Hypokalemia     Hypothyroidism     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    Mumps     ANNETTE (obstructive sleep apnea)     Osteoarthritis of glenohumeral joint     Papillary carcinoma of thyroid (H)     s/p thyroidectomy - Ruegemer    Poliomyelitis     poor circulation right leg    Postsurgical hypothyroidism     s/p papillary thryoid cancer - Ruegemer    Pulmonary embolism (H)     Pulmonary nodule     S/P carpal tunnel release     bilateral    S/P hardware removal 01/2014    still with lingering foot pain    S/P shoulder surgery     bilateral    Septic joint (H)     right knee    Venous insufficiency       PAST SURGICAL HISTORY:   has a past surgical history that includes FREEING BOWEL ADHESION,ENTEROLYSIS; GI surgery; Colostomy (02/07/2012); Laparotomy, lysis adhesions, combined (02/07/2012); Arthrodesis foot (03/15/2012); Release tendon foot (03/15/2012); Amputate toe(s) (03/15/2012); Remove hardware foot (12/13/2012); appendectomy (1972); Cholecystectomy; TOTAL ABDOM HYSTERECTOMY (1980); NONSPECIFIC PROCEDURE; Arthroscopy shoulder rotator cuff repair; Release carpal tunnel; Cholecystectomy; appendectomy; Hysterectomy total abdominal; Amputate toe(s); Tendon Release; Colostomy; Thyroidectomy; Arthrodesis foot (Right); Arthroscopy shoulder (06/25/2015); shoulder surgery (11/12/2020); biopsy (Thyroid 2002); Breast surgery (Biopsy); colonoscopy (2018);  Eye surgery; Abdomen surgery (1999); hernia repair (1976); Soft tissue surgery (2018, 2020); Bladder surgery; Cystoscopy; Cystoscopy, Inject Botox (N/A, 09/18/2023); Botox Injection Medical; Coronary Angiogram (N/A, 11/17/2023); Angiogram Renal Bilateral (Bilateral, 11/17/2023); Cystoscopy, Inject Botox (N/A, 2/20/2024); Cystoscopy, transurethral resection (TUR) tumor bladder, combined (N/A, 11/4/2024); Cystoscopy, Inject Botox (N/A, 11/4/2024); and Open reduction internal fixation humerus proximal (Left, 1/26/2025).  FAMILY HISTORY: family history includes Arthritis in her mother, sister, and sister; Breast Cancer in her sister; Cancer in her maternal grandmother, sister, and sister; Cerebrovascular Disease in her mother; Colon Cancer in her sister; Depression in her sister; Diabetes in her brother, father, and sister; Heart Disease in her brother and mother; Hyperlipidemia in her brother; Hypertension in her brother, father, mother, sister, sister, and sister; Lipids in her brother and sister; Obesity in her maternal grandmother, mother, paternal grandmother, sister, sister, sister, and sister; Osteoporosis in her sister and sister; Other Cancer in her brother; Respiratory in her father; Skin Cancer in her maternal grandmother and sister; Thyroid Disease in her sister and sister.  SOCIAL HISTORY:   reports that she has never smoked. She has never been exposed to tobacco smoke. She has never used smokeless tobacco. She reports that she does not drink alcohol and does not use drugs.  Patient's living condition: lives alone    Post Discharge Medication Reconciliation Status:   MED REC REQUIRED  Post Medication Reconciliation Status:  Discharge medications reconciled and changed, see notes/orders     Current Outpatient Medications   Medication Sig Dispense Refill    acetaminophen (TYLENOL) 325 MG tablet Take 3 tablets (975 mg) by mouth every 8 hours.      amLODIPine (NORVASC) 2.5 MG tablet Take by mouth daily as  needed (systolic blood pressure > 140 mmHg). Take 0.5 tablets (1.25 mg) as needed prior to MD appointments for systolic blood pressure > 140 mmHg. Take an additional 0.5 tablets (1.25 mg) if systolic blood pressure is still elevated.      aspirin 81 MG EC tablet Take 1 tablet (81 mg) by mouth daily.      BIOTIN PO Take 1 capsule by mouth at bedtime Unknown dose      calcium citrate 250 MG TABS Take 1 tablet by mouth at bedtime      carvedilol (COREG) 12.5 MG tablet Take 1 tablet (12.5 mg) by mouth 2 times daily (with meals) 180 tablet 2    clotrimazole (LOTRIMIN) 1 % cream Apply topically as needed (rash/yeast infection)      cyanocobalamin (VITAMIN B-12) 1000 MCG tablet Take 1,000 mcg by mouth every evening      empagliflozin (JARDIANCE) 10 MG TABS tablet Take 1 tablet (10 mg) by mouth every evening. 90 tablet 3    ezetimibe (ZETIA) 10 MG tablet Take 1 tablet (10 mg) by mouth every evening. 90 tablet 3    famotidine (PEPCID) 40 MG tablet Take 40 mg by mouth daily.      fenofibrate (TRICOR) 145 MG tablet Take 1 tablet (145 mg) by mouth at bedtime. 90 tablet 3    fexofenadine (ALLEGRA) 180 MG tablet Take 180 mg by mouth daily (with lunch) Takes in the afternoon 120 0    furosemide (LASIX) 20 MG tablet Take 1 tablet (20 mg) by mouth daily as needed (lower extremity edema).      icosapent ethyl (VASCEPA) 1 g CAPS capsule Take 2 g by mouth every evening. 180 capsule 3    insulin aspart (NOVOLOG PEN) 100 UNIT/ML pen Inject 10 Units subcutaneously daily (with breakfast) AND 12 Units daily (before supper). 15 mL 4    insulin glargine 100 UNIT/ML pen Inject 10 Units subcutaneously every morning. 15 mL 4    ipratropium (ATROVENT) 0.03 % nasal spray Spray 1 spray in nostril every 8 hours as needed      ketoconazole (NIZORAL) 2 % external cream Apply topically 2 times daily as needed      Lidocaine (LIDOCARE) 4 % Patch Place 1 patch onto the skin as needed To prevent lidocaine toxicity, patient should be patch free for 12 hrs  daily.      methocarbamol (ROBAXIN) 500 MG tablet Take 1-2 tablets (500-1,000 mg) by mouth 4 times daily as needed for muscle spasms. (Patient taking differently: Take 500 mg by mouth 4 times daily as needed for muscle spasms.) 30 tablet 0    minoxidil (LONITEN) 2.5 MG tablet Take 0.25 tablets by mouth twice a week On Monday and Thursday      Multiple vitamin TABS Take 1 tablet by mouth daily      nitroGLYcerin (NITROSTAT) 0.4 MG sublingual tablet Place 1 tablet (0.4 mg) under the tongue every 5 minutes as needed for chest pain (no more than 3 in one hour; after 3rd, call 911.) 25 tablet 3    olmesartan (BENICAR) 40 MG tablet Take 1 tablet (40 mg) by mouth daily. 90 tablet 3    ondansetron (ZOFRAN) 4 MG tablet Take 1 tablet (4 mg) by mouth every 6 hours as needed Reported on 3/20/2017 20 tablet 0    oxyCODONE (ROXICODONE) 5 MG tablet Take 1 tablet (5 mg) by mouth every 4 hours as needed for severe pain. 20 tablet 0    pantoprazole (PROTONIX) 40 MG EC tablet Take 40 mg by mouth 2 times daily      Polyethylene Glycol 400 (BLINK TEARS) 0.25 % GEL Place 1 drop into both eyes at bedtime      senna-docusate (SENOKOT-S/PERICOLACE) 8.6-50 MG tablet Take 1 tablet by mouth 2 times daily as needed for constipation.      sucralfate (CARAFATE) 1 GM/10ML suspension Take 1 g by mouth 4 times daily as needed (GERD)      SYNTHROID 112 MCG tablet Take 1 tablet (112 mcg) by mouth daily. 90 tablet 3    tretinoin (RETIN-A) 0.025 % external cream Apply topically nightly as needed (facial lesions) Use every night as tolerated - spot treat lesion      triamcinolone (KENALOG) 0.025 % external ointment Apply topically 2 times daily as needed for irritation      Vitamin D3 50 mcg (2000 units) tablet Take 1 tablet by mouth every evening Takes in the afternoon      blood glucose (NO BRAND SPECIFIED) test strip Use to test blood sugar 3 times daily or as directed. 200 strip 3    Continuous Glucose Sensor (FREESTYLE BRANDY 3 PLUS SENSOR) [facility]  "each once for 1 dose. Every 15 days 6 each 3     No current facility-administered medications for this visit.       ROS:  10 point ROS of systems including Constitutional, Eyes, Respiratory, Cardiovascular, Gastroenterology, Genitourinary, Integumentary, Musculoskeletal, Psychiatric were all negative except for pertinent positives noted in my HPI.    Vitals:  BP (!) 145/69   Pulse 63   Temp 97.2  F (36.2  C)   Resp 18   Ht 1.6 m (5' 3\")   Wt 94.3 kg (207 lb 12.8 oz)   LMP  (LMP Unknown)   SpO2 96%   BMI 36.81 kg/m    Exam:  GENERAL APPEARANCE:  Alert, in NAD  HEENT: normocephalic, moist mucous membranes, nose without drainage or crusting  RESP:  respiratory effort normal, no respiratory distress, Lung sounds clear, patient is on RA  CV: auscultation of heart done, rate and rhythm regular.   ABDOMEN: + bowel sounds, colostomy present  M/S:   1+ bilateral lower extremity edema, left arm in sling  SKIN:  Inspection and palpation of skin and subcutaneous tissue: skin warm, dry without rashes  NEURO: cranial nerves 2-12 grossly intact and at patient's baseline; no facial asymmetry, no speech deficits and able to follow directions  PSYCH: oriented x 3, affect and mood normal      Lab/Diagnostic data:  Labs done in SNF are in [facility]. Please refer to them using myDrugCosts/Care Everywhere. and Recent labs in [facility] reviewed by me today.     ASSESSMENT/PLAN:  Left periprosthetic humerus fracture status post ORIF on 1/26/2025  Fall, subsequent encounter  Pain managed, rates it 2-3/10 today  Plan: Continue pain management with Tylenol 975 mg every 8 hours, lidocaine patch, methocarbamol 500 mg 4 times daily as needed, oxycodone 5 mg every 4 hours as needed.  Continue aspirin 81 mg daily x 3 weeks for DVT prophylaxis. Nonweightbearing to shoulder in all times. Therapy as below. Wound care as ordered. Follow up with ortho 2 weeks.     Acute anemia  Chronic anemia  Hemoglobin 11  Hemoglobin 9.9 on 1/31/2025  Plan: CBC 2/4. " Monitor for s/s of bleeding.    Nonobstructive CAD  Essential hypertension  Dyslipidemia  -150s  Plan: Hold amlodipine. Continue carvedilol 12.5 mg BID, olmesartan 40 mg daily, furosemide 20 mg daily PRN. Continue icosapent 2 gram daily, fexofenadine 180 mg daily, ezetimibe 10 mg daily, nitroglycerin PRN. Monitor BP and cardiac status. Start compression stockings on in AM, off in PM    Type 2 diabetes  Hemoglobin a1c 7.6 on 10/7/2025  -281  Plan: Continue empagliflozin 10 mg daily. Continue glargine 10 units daily and hold if BS<120 and aspart 10 units daily with breakfast and 12 units with supper and hold if BS<100 or not eating. QID BS. Carb controlled diet.    CKD stage 3  Baseline creatinine 1.2-1.5  Creatinine 1.24 on 1/31/2025  Plan: BMP 2/4. Avoid nephrotoxins. Renally dose medications as indicated.    Hypothyroidism  TSH 0.42 on 10/7/2024  Plan: Continue levothyroxine 112 mcg daily. Repeat TSH 2/4.    GERD  Plan: Continue famotidine 40 mg daily and pantoprazole 40 mg BID and sucralafate 1 gram QID. Monitor symptoms.    Colostomy in place  At risk for Slow transit constipation  Bowels moving regularly  Plan: Continue pain senna S1 tab twice daily as needed.  Monitor bowels.  Nursing to continue colostomy cares.    Obesity, BMI 36.81  ANNETTE  Complicates care  Plan: Encourage weight loss.  Dietitian follows in the TCU.  Continue CPAP per home settings.    Physical deconditioning  Secondary to recent hospitalization, medical conditions as above  Plan: Encourage participation in physical therapy/occupational therapy for strengthening and deconditioning. Discharge planning per their recommendation. Social work to assist with d/c planning.      Total time spent with patient visit at the skilled nursing facility was 50 minutes including patient visit, review of past records, medication reconciliation, writing of orders at facility, discussion with nursing staff regarding plan of care.      Disclaimer:  This note may contain text created using speech-recognition software and may contain unintended word substitutions.    Electronically signed by:  MAGALY Ferrera CNP

## 2025-02-04 ENCOUNTER — LAB REQUISITION (OUTPATIENT)
Dept: LAB | Facility: CLINIC | Age: 80
End: 2025-02-04
Payer: MEDICARE

## 2025-02-04 DIAGNOSIS — E03.9 HYPOTHYROIDISM, UNSPECIFIED: ICD-10-CM

## 2025-02-04 DIAGNOSIS — D64.9 ANEMIA, UNSPECIFIED: ICD-10-CM

## 2025-02-04 DIAGNOSIS — N18.9 CHRONIC KIDNEY DISEASE, UNSPECIFIED: ICD-10-CM

## 2025-02-04 LAB
QUANTIFERON MITOGEN: 0.47 IU/ML
QUANTIFERON NIL TUBE: 0.01 IU/ML
QUANTIFERON TB1 TUBE: 0.04 IU/ML
QUANTIFERON TB2 TUBE: 0.03

## 2025-02-05 ENCOUNTER — TRANSITIONAL CARE UNIT VISIT (OUTPATIENT)
Dept: GERIATRICS | Facility: CLINIC | Age: 80
End: 2025-02-05
Payer: MEDICARE

## 2025-02-05 VITALS
OXYGEN SATURATION: 99 % | HEIGHT: 63 IN | DIASTOLIC BLOOD PRESSURE: 77 MMHG | HEART RATE: 62 BPM | RESPIRATION RATE: 18 BRPM | SYSTOLIC BLOOD PRESSURE: 156 MMHG | TEMPERATURE: 97.9 F | WEIGHT: 204 LBS | BODY MASS INDEX: 36.14 KG/M2

## 2025-02-05 DIAGNOSIS — C73 PAPILLARY CARCINOMA OF THYROID (H): ICD-10-CM

## 2025-02-05 DIAGNOSIS — I25.10 NON-OCCLUSIVE CORONARY ARTERY DISEASE: ICD-10-CM

## 2025-02-05 DIAGNOSIS — E66.01 CLASS 2 SEVERE OBESITY WITH SERIOUS COMORBIDITY AND BODY MASS INDEX (BMI) OF 36.0 TO 36.9 IN ADULT, UNSPECIFIED OBESITY TYPE (H): ICD-10-CM

## 2025-02-05 DIAGNOSIS — N18.30 STAGE 3 CHRONIC KIDNEY DISEASE, UNSPECIFIED WHETHER STAGE 3A OR 3B CKD (H): ICD-10-CM

## 2025-02-05 DIAGNOSIS — Z93.3 COLOSTOMY IN PLACE (H): ICD-10-CM

## 2025-02-05 DIAGNOSIS — M97.8XXD: Primary | ICD-10-CM
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
## 2025-02-05 DIAGNOSIS — M79.662 PAIN OF LEFT LOWER LEG: ICD-10-CM

## 2025-02-05 DIAGNOSIS — Z98.890 S/P ORIF (OPEN REDUCTION INTERNAL FIXATION) FRACTURE: ICD-10-CM

## 2025-02-05 DIAGNOSIS — D64.9 ACUTE ON CHRONIC ANEMIA: ICD-10-CM

## 2025-02-05 DIAGNOSIS — K21.9 GASTROESOPHAGEAL REFLUX DISEASE WITHOUT ESOPHAGITIS: ICD-10-CM

## 2025-02-05 DIAGNOSIS — E78.5 DYSLIPIDEMIA: ICD-10-CM

## 2025-02-05 DIAGNOSIS — E66.812 CLASS 2 SEVERE OBESITY WITH SERIOUS COMORBIDITY AND BODY MASS INDEX (BMI) OF 36.0 TO 36.9 IN ADULT, UNSPECIFIED OBESITY TYPE (H): ICD-10-CM

## 2025-02-05 DIAGNOSIS — G47.33 OSA (OBSTRUCTIVE SLEEP APNEA): ICD-10-CM

## 2025-02-05 DIAGNOSIS — Z96.619: Primary | ICD-10-CM

## 2025-02-05 DIAGNOSIS — Z79.4 TYPE 2 DIABETES MELLITUS WITH CHRONIC KIDNEY DISEASE, WITH LONG-TERM CURRENT USE OF INSULIN, UNSPECIFIED CKD STAGE (H): ICD-10-CM

## 2025-02-05 DIAGNOSIS — Z87.81 S/P ORIF (OPEN REDUCTION INTERNAL FIXATION) FRACTURE: ICD-10-CM

## 2025-02-05 DIAGNOSIS — R53.81 PHYSICAL DECONDITIONING: ICD-10-CM

## 2025-02-05 DIAGNOSIS — E11.22 TYPE 2 DIABETES MELLITUS WITH CHRONIC KIDNEY DISEASE, WITH LONG-TERM CURRENT USE OF INSULIN, UNSPECIFIED CKD STAGE (H): ICD-10-CM

## 2025-02-05 DIAGNOSIS — E03.9 HYPOTHYROIDISM, UNSPECIFIED TYPE: ICD-10-CM

## 2025-02-05 DIAGNOSIS — I10 PRIMARY HYPERTENSION: ICD-10-CM

## 2025-02-05 LAB
ANION GAP SERPL CALCULATED.3IONS-SCNC: 10 MMOL/L (ref 7–15)
BUN SERPL-MCNC: 41.2 MG/DL (ref 8–23)
CALCIUM SERPL-MCNC: 8.9 MG/DL (ref 8.8–10.4)
CHLORIDE SERPL-SCNC: 101 MMOL/L (ref 98–107)
CREAT SERPL-MCNC: 1.42 MG/DL (ref 0.51–0.95)
EGFRCR SERPLBLD CKD-EPI 2021: 38 ML/MIN/1.73M2
ERYTHROCYTE [DISTWIDTH] IN BLOOD BY AUTOMATED COUNT: 13.3 % (ref 10–15)
GAMMA INTERFERON BACKGROUND BLD IA-ACNC: 0.01 IU/ML
GLUCOSE SERPL-MCNC: 105 MG/DL (ref 70–99)
HCO3 SERPL-SCNC: 25 MMOL/L (ref 22–29)
HCT VFR BLD AUTO: 28 % (ref 35–47)
HGB BLD-MCNC: 8.9 G/DL (ref 11.7–15.7)
M TB IFN-G BLD-IMP: ABNORMAL
M TB IFN-G CD4+ BCKGRND COR BLD-ACNC: 0.46 IU/ML
MCH RBC QN AUTO: 30.9 PG (ref 26.5–33)
MCHC RBC AUTO-ENTMCNC: 31.8 G/DL (ref 31.5–36.5)
MCV RBC AUTO: 97 FL (ref 78–100)
MITOGEN IGNF BCKGRD COR BLD-ACNC: 0.02 IU/ML
MITOGEN IGNF BCKGRD COR BLD-ACNC: 0.03 IU/ML
PLATELET # BLD AUTO: 230 10E3/UL (ref 150–450)
POTASSIUM SERPL-SCNC: 5.2 MMOL/L (ref 3.4–5.3)
RBC # BLD AUTO: 2.88 10E6/UL (ref 3.8–5.2)
SODIUM SERPL-SCNC: 136 MMOL/L (ref 135–145)
TSH SERPL DL<=0.005 MIU/L-ACNC: 11.85 UIU/ML (ref 0.3–4.2)
WBC # BLD AUTO: 6.7 10E3/UL (ref 4–11)

## 2025-02-05 PROCEDURE — 99309 SBSQ NF CARE MODERATE MDM 30: CPT | Performed by: NURSE PRACTITIONER

## 2025-02-05 PROCEDURE — 85027 COMPLETE CBC AUTOMATED: CPT | Mod: ORL | Performed by: NURSE PRACTITIONER

## 2025-02-05 PROCEDURE — 84443 ASSAY THYROID STIM HORMONE: CPT | Mod: ORL | Performed by: NURSE PRACTITIONER

## 2025-02-05 PROCEDURE — P9603 ONE-WAY ALLOW PRORATED MILES: HCPCS | Mod: ORL | Performed by: NURSE PRACTITIONER

## 2025-02-05 PROCEDURE — 80048 BASIC METABOLIC PNL TOTAL CA: CPT | Performed by: NURSE PRACTITIONER

## 2025-02-05 PROCEDURE — 80048 BASIC METABOLIC PNL TOTAL CA: CPT | Mod: ORL | Performed by: NURSE PRACTITIONER

## 2025-02-05 PROCEDURE — 84132 ASSAY OF SERUM POTASSIUM: CPT | Performed by: NURSE PRACTITIONER

## 2025-02-05 PROCEDURE — 36415 COLL VENOUS BLD VENIPUNCTURE: CPT | Performed by: NURSE PRACTITIONER

## 2025-02-05 RX ORDER — AMOXICILLIN 250 MG
1 CAPSULE ORAL AT BEDTIME
Status: SHIPPED
Start: 2025-02-05

## 2025-02-05 RX ORDER — LEVOTHYROXINE SODIUM 125 UG/1
125 TABLET ORAL
Status: SHIPPED
Start: 2025-02-05

## 2025-02-05 RX ORDER — MENTHOL 100 MG/G
4 CREAM TOPICAL 3 TIMES DAILY PRN
Status: SHIPPED
Start: 2025-02-05

## 2025-02-05 NOTE — PATIENT INSTRUCTIONS
Orders  Chasity Hodgson  1945  1) Change senna S1 tab at bedtime and 1 tab twice daily as needed. Diagnosis: constipation  2) Continue glargine 10 units daily and update hold parameter to hold if BS<110. Diagnosis: type 2 DM  3) US STAT to left lower extremity to rule out DVT. Diagnosis: pain  4) Start biofreeze 10% PRN. Apply 4 grams to left leg for pain TID PRN.  MAGALY Ferrera CNP on 2/5/2025 at 8:46 AM

## 2025-02-05 NOTE — PROGRESS NOTES
[facility] GERIATRICS    Chief Complaint   Patient presents with    RECHECK     HPI:  Chasity Hodgsno is a 79 year old  (1945), who is being seen today for an episodic care visit at: [facility]  ([facility]) [[id number]].     Past medical history significant for nonobstructive CAD, hypertension, dyslipidemia, type 2 diabetes, CKD, colostomy in place, hypothyroidism, GERD, depression, obesity, ANNETTE     Summary of recent hospitalization:  Patient was hospitalized at [facility] from 1/25/2025 to 1/31/2025 for left periprosthetic humerus fracture secondary to a fall.  Patient presented to the emergency department for evaluation of arm pain after patient was bending to move something on the floor and lost her balance.  In the emergency department laboratory evaluation significant for creatinine 1.36, hemoglobin 11.3.  Shoulder CT revealed fracture of left humerus beginning of the humeral neck and extending distally beyond the tip of the humeral endoprosthesis, apex medial angulation deformity at the fracture site, no dislocation, degenerative change at the AC joint, edema within the soft tissues superficial to the deltoid, enlargement of triceps likely secondary to a component of inflammation and hemorrhagic blood products but no organized hematoma.  Ortho was consulted and patient's status post ORIF on 1/26/2025.  Patient to remain nonweightbearing with shoulder in all times.  Aspirin for DVT prophylaxis x 3 weeks.  Hemoglobin postoperatively down to 8.6.  Hospitalization complicated by episode of orthostatic and dizziness blood pressure, PTA losartan decreased, but patient discharged on home benicar dose.  Patient with hemolyzed specimen on lab draw from 1/31, with hyperkalemia noted.Comment: Acute, secondary to recent hospitalization, medical conditions as above  Plan: Encourage participation in physical therapy/occupational therapy for strengthening and deconditioning. Discharge planning per  "their recommendation. Social work to assist with d/c planning.      Today patient was seen for follow-up in the TCU.  She rates her pain about a 7 out of 10 this morning, reports that overall pain medication is helpful and does bring her pain level down.  She is also reporting some left lower extremity cramping and pain that started sometime after her surgery.  She does report that her right leg is weaker due to history of polio and paralysis in that leg.  She denies shortness of breath, chest pain, dizziness/lightheadedness.  She is requesting stool softener be added for her bowels, but does report that they are moving okay.  Patient denies dysuria.  She continues to work with therapy.  Transfers contact-guard assist, mod to max assist for upper and lower body dressing and toileting.  RUST 28/30    Reviewed facility EMR including medications, recent nursing progress notes, vital signs.  Discussed plan of care with nursing.    Allergies, and PMH/PSH reviewed in EPIC today.  REVIEW OF SYSTEMS:  8 point ROS of systems including Constitutional, Respiratory, Cardiovascular, Gastroenterology, Genitourinary,  Musculoskeletal, Psychiatric were all negative except for pertinent positives noted in my HPI.    Objective:   BP (!) 156/77   Pulse 62   Temp 97.9  F (36.6  C)   Resp 18   Ht 1.6 m (5' 3\")   Wt 92.5 kg (204 lb)   LMP  (LMP Unknown)   SpO2 99%   BMI 36.14 kg/m    GENERAL APPEARANCE:  Alert, in NAD  HEENT: normocephalic, moist mucous membranes, nose without drainage or crusting  RESP:  respiratory effort normal, no respiratory distress, Lung sounds clear, patient is on RA  CV: auscultation of heart done, rate and rhythm regular.   ABDOMEN: + bowel sounds, colostomy in place  M/S:  1-2+ bilateral lower extremity edema, tenderness to left calf and behind the knee with palpation  NEURO: Moves extremities freely, speech is normal  PSYCH:  affect and mood normal        Labs done in SNF are in [place] EPIC. Please " refer to them using EPIC/Care Everywhere. and Recent labs in [facility] reviewed by me today.     Assessment/Plan:  Left periprosthetic humerus fracture status post ORIF on 1/26/2025  Fall, subsequent encounter  Pain is managed  Plan: Continue pain management with Tylenol 975 mg every 8 hours, lidocaine patch, methocarbamol 500 mg 4 times daily as needed, oxycodone 5 mg every 4 hours as needed.  Continue aspirin 81 mg daily x 3 weeks for DVT prophylaxis. Nonweightbearing to shoulder in all times. Therapy as below. Wound care as ordered. Follow up with ortho 2 weeks.      Left lower extremity cramping and discomfort  Reports started after surgery, also notes that right leg is weaker due to history of paralysis due to polio  Plan: US STAT to left lower extremity to rule out DVT. Start biofreeze PRN. Monitor pain.     Acute anemia  Chronic anemia  Hemoglobin 11  Hemoglobin 9.9 on 1/31/2025  Plan: CBC pending today. Monitor for s/s of bleeding.     Nonobstructive CAD  Essential hypertension  Dyslipidemia  -150s, suspect BP is elevated due to pain  Plan: Hold amlodipine. Continue carvedilol 12.5 mg BID, olmesartan 40 mg daily, furosemide 20 mg daily PRN. Continue icosapent 2 gram daily, fexofenadine 180 mg daily, ezetimibe 10 mg daily, nitroglycerin PRN. Monitor BP and cardiac status. Start compression stockings on in AM, off in PM     Type 2 diabetes  Hemoglobin a1c 7.6 on 10/7/2025  -281, majority are <200  Plan: Continue empagliflozin 10 mg daily. Continue glargine 10 units daily and hold if BS<110 and aspart 10 units daily with breakfast and 12 units with supper and hold if BS<100 or not eating. QID BS. Carb controlled diet.     CKD stage 3  Baseline creatinine 1.2-1.5  Creatinine 1.24 on 1/31/2025  Plan: BMP pending today. Avoid nephrotoxins. Renally dose medications as indicated.     Hypothyroidism  TSH 0.42 on 10/7/2024  Plan: Continue levothyroxine 112 mcg daily. Repeat TSH 2/4.     GERD  Plan: Continue  famotidine 40 mg daily and pantoprazole 40 mg BID and sucralafate 1 gram QID. Monitor symptoms.     Colostomy in place  At risk for Slow transit constipation  Reports feeling constipated  Plan: Start senna S1 tab at bedtime and 1 tab twice daily as needed.  Monitor bowels.  Nursing to continue colostomy cares.     Obesity, BMI 36.14  ANNETTE  Complicates care  Plan: Encourage weight loss.  Dietitian follows in the TCU.  Continue CPAP per home settings.     Physical deconditioning  Secondary to recent hospitalization, medical conditions as above  Patient continues to work with therapy  Plan: Encourage participation in physical therapy/occupational therapy for strengthening and deconditioning. Discharge planning per their recommendation. Social work to assist with d/c planning.     Cognitive testing in the TCU  SLUMS 28/30    MED REC REQUIRED  Post Medication Reconciliation Status:  Medication reconciliation previously completed during another office visit      Disclaimer: This note may contain text created using speech-recognition software and may contain unintended word substitutions.       Electronically signed by: MAGALY Ferrera CNP

## 2025-02-05 NOTE — LETTER
2/5/2025      Chasity Hodgson  [address] [facility] [place]  [facility] [address]        [facility]    Chief Complaint   Patient presents with     RECHECK     HPI:  Chasity Hodgson is a 79 year old  (1945), who is being seen today for an episodic care visit at: [facility]  ([facility]) [[id number]].     Past medical history significant for nonobstructive CAD, hypertension, dyslipidemia, type 2 diabetes, CKD, colostomy in place, hypothyroidism, GERD, depression, obesity, ANNETTE     Summary of recent hospitalization:  Patient was hospitalized at [facility] from 1/25/2025 to 1/31/2025 for left periprosthetic humerus fracture secondary to a fall.  Patient presented to the emergency department for evaluation of arm pain after patient was bending to move something on the floor and lost her balance.  In the emergency department laboratory evaluation significant for creatinine 1.36, hemoglobin 11.3.  Shoulder CT revealed fracture of left humerus beginning of the humeral neck and extending distally beyond the tip of the humeral endoprosthesis, apex medial angulation deformity at the fracture site, no dislocation, degenerative change at the AC joint, edema within the soft tissues superficial to the deltoid, enlargement of triceps likely secondary to a component of inflammation and hemorrhagic blood products but no organized hematoma.  Ortho was consulted and patient's status post ORIF on 1/26/2025.  Patient to remain nonweightbearing with shoulder in all times.  Aspirin for DVT prophylaxis x 3 weeks.  Hemoglobin postoperatively down to 8.6.  Hospitalization complicated by episode of orthostatic and dizziness blood pressure, PTA losartan decreased, but patient discharged on home benicar dose.  Patient with hemolyzed specimen on lab draw from 1/31, with hyperkalemia noted.Comment: Acute, secondary to recent hospitalization, medical conditions as above  Plan: Encourage participation in physical  "therapy/occupational therapy for strengthening and deconditioning. Discharge planning per their recommendation. Social work to assist with d/c planning.      Today patient was seen for follow-up in the TCU.  She rates her pain about a 7 out of 10 this morning, reports that overall pain medication is helpful and does bring her pain level down.  She is also reporting some left lower extremity cramping and pain that started sometime after her surgery.  She does report that her right leg is weaker due to history of polio and paralysis in that leg.  She denies shortness of breath, chest pain, dizziness/lightheadedness.  She is requesting stool softener be added for her bowels, but does report that they are moving okay.  Patient denies dysuria.  She continues to work with therapy.  Transfers contact-guard assist, mod to max assist for upper and lower body dressing and toileting.  [facility] 28/30    Reviewed facility EMR including medications, recent nursing progress notes, vital signs.  Discussed plan of care with nursing.    Allergies, and PMH/PSH reviewed in [employer] [place] today.  REVIEW OF SYSTEMS:  8 point ROS of systems including Constitutional, Respiratory, Cardiovascular, Gastroenterology, Genitourinary,  Musculoskeletal, Psychiatric were all negative except for pertinent positives noted in my HPI.    Objective:   BP (!) 156/77   Pulse 62   Temp 97.9  F (36.6  C)   Resp 18   Ht 1.6 m (5' 3\")   Wt 92.5 kg (204 lb)   LMP  (LMP Unknown)   SpO2 99%   BMI 36.14 kg/m    GENERAL APPEARANCE:  Alert, in NAD  HEENT: normocephalic, moist mucous membranes, nose without drainage or crusting  RESP:  respiratory effort normal, no respiratory distress, Lung sounds clear, patient is on RA  CV: auscultation of heart done, rate and rhythm regular.   ABDOMEN: + bowel sounds, colostomy in place  M/S:  1-2+ bilateral lower extremity edema, tenderness to left calf and behind the knee with palpation  NEURO: Moves extremities freely, speech is " normal  PSYCH:  affect and mood normal        Labs done in SNF are in [facility]. Please refer to them using EPIC/Care Everywhere. and Recent labs in [facility] reviewed by me today.     Assessment/Plan:  Left periprosthetic humerus fracture status post ORIF on 1/26/2025  Fall, subsequent encounter  Pain is managed  Plan: Continue pain management with Tylenol 975 mg every 8 hours, lidocaine patch, methocarbamol 500 mg 4 times daily as needed, oxycodone 5 mg every 4 hours as needed.  Continue aspirin 81 mg daily x 3 weeks for DVT prophylaxis. Nonweightbearing to shoulder in all times. Therapy as below. Wound care as ordered. Follow up with ortho 2 weeks.      Left lower extremity cramping and discomfort  Reports started after surgery, also notes that right leg is weaker due to history of paralysis due to polio  Plan: US STAT to left lower extremity to rule out DVT. Start biofreeze PRN. Monitor pain.     Acute anemia  Chronic anemia  Hemoglobin 11  Hemoglobin 9.9 on 1/31/2025  Plan: CBC pending today. Monitor for s/s of bleeding.     Nonobstructive CAD  Essential hypertension  Dyslipidemia  -150s, suspect BP is elevated due to pain  Plan: Hold amlodipine. Continue carvedilol 12.5 mg BID, olmesartan 40 mg daily, furosemide 20 mg daily PRN. Continue icosapent 2 gram daily, fexofenadine 180 mg daily, ezetimibe 10 mg daily, nitroglycerin PRN. Monitor BP and cardiac status. Start compression stockings on in AM, off in PM     Type 2 diabetes  Hemoglobin a1c 7.6 on 10/7/2025  -281, majority are <200  Plan: Continue empagliflozin 10 mg daily. Continue glargine 10 units daily and hold if BS<110 and aspart 10 units daily with breakfast and 12 units with supper and hold if BS<100 or not eating. QID BS. Carb controlled diet.     CKD stage 3  Baseline creatinine 1.2-1.5  Creatinine 1.24 on 1/31/2025  Plan: BMP pending today. Avoid nephrotoxins. Renally dose medications as indicated.     Hypothyroidism  TSH 0.42 on  10/7/2024  Plan: Continue levothyroxine 112 mcg daily. Repeat TSH 2/4.     GERD  Plan: Continue famotidine 40 mg daily and pantoprazole 40 mg BID and sucralafate 1 gram QID. Monitor symptoms.     Colostomy in place  At risk for Slow transit constipation  Reports feeling constipated  Plan: Start senna S1 tab at bedtime and 1 tab twice daily as needed.  Monitor bowels.  Nursing to continue colostomy cares.     Obesity, BMI 36.14  ANNETTE  Complicates care  Plan: Encourage weight loss.  Dietitian follows in the TCU.  Continue CPAP per home settings.     Physical deconditioning  Secondary to recent hospitalization, medical conditions as above  Patient continues to work with therapy  Plan: Encourage participation in physical therapy/occupational therapy for strengthening and deconditioning. Discharge planning per their recommendation. Social work to assist with d/c planning.     Cognitive testing in the TCU  [facility]    [facility] REC REQUIRED  Post Medication Reconciliation Status:  Medication reconciliation previously completed during another office visit      Disclaimer: This note may contain text created using speech-recognition software and may contain unintended word substitutions.       Electronically signed by: MAGALY Ferrera CNP         Sincerely,        MAGALY Ferrera CNP    Electronically signed

## 2025-02-05 NOTE — PROGRESS NOTES
Orders  Chasity Hodgson  1945  1) Increase levothyroxine to 125 mcg daily. Diagnosis: hypothyroidism  2) Start low potassium diet  3) BMP 2/7. Diagnosis: CKD  MAGALY Ferrera CNP on 2/5/2025 at 10:29 AM

## 2025-02-06 DIAGNOSIS — Z98.890 S/P SHOULDER SURGERY: ICD-10-CM

## 2025-02-06 RX ORDER — OXYCODONE HYDROCHLORIDE 5 MG/1
5 TABLET ORAL EVERY 4 HOURS PRN
Qty: 20 TABLET | Refills: 0 | Status: SHIPPED | OUTPATIENT
Start: 2025-02-06

## 2025-02-08 ENCOUNTER — MYC MEDICAL ADVICE (OUTPATIENT)
Dept: DERMATOLOGY | Facility: CLINIC | Age: 80
End: 2025-02-08
Payer: MEDICARE

## 2025-02-11 ENCOUNTER — TRANSITIONAL CARE UNIT VISIT (OUTPATIENT)
Dept: GERIATRICS | Facility: CLINIC | Age: 80
End: 2025-02-11
Payer: MEDICARE

## 2025-02-11 VITALS
RESPIRATION RATE: 18 BRPM | HEART RATE: 56 BPM | WEIGHT: 203 LBS | SYSTOLIC BLOOD PRESSURE: 147 MMHG | HEIGHT: 63 IN | BODY MASS INDEX: 35.97 KG/M2 | DIASTOLIC BLOOD PRESSURE: 51 MMHG | OXYGEN SATURATION: 96 % | TEMPERATURE: 97.8 F

## 2025-02-11 DIAGNOSIS — Z87.81 S/P ORIF (OPEN REDUCTION INTERNAL FIXATION) FRACTURE: ICD-10-CM

## 2025-02-11 DIAGNOSIS — E11.69 TYPE 2 DIABETES MELLITUS WITH OTHER SPECIFIED COMPLICATION, WITH LONG-TERM CURRENT USE OF INSULIN (H): ICD-10-CM

## 2025-02-11 DIAGNOSIS — M79.662 PAIN OF LEFT LOWER LEG: ICD-10-CM

## 2025-02-11 DIAGNOSIS — E11.22 TYPE 2 DIABETES MELLITUS WITH CHRONIC KIDNEY DISEASE, WITH LONG-TERM CURRENT USE OF INSULIN, UNSPECIFIED CKD STAGE (H): ICD-10-CM
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
## 2025-02-11 DIAGNOSIS — N18.30 STAGE 3 CHRONIC KIDNEY DISEASE, UNSPECIFIED WHETHER STAGE 3A OR 3B CKD (H): ICD-10-CM

## 2025-02-11 DIAGNOSIS — I10 PRIMARY HYPERTENSION: ICD-10-CM

## 2025-02-11 DIAGNOSIS — N18.32 CHRONIC KIDNEY DISEASE, STAGE 3B (H): ICD-10-CM

## 2025-02-11 DIAGNOSIS — Z79.4 TYPE 2 DIABETES MELLITUS WITH CHRONIC KIDNEY DISEASE, WITH LONG-TERM CURRENT USE OF INSULIN, UNSPECIFIED CKD STAGE (H): ICD-10-CM

## 2025-02-11 DIAGNOSIS — I83.892 VENOUS STASIS ULCER OF LEFT LOWER LEG WITH EDEMA OF LEFT LOWER LEG (H): ICD-10-CM

## 2025-02-11 DIAGNOSIS — E66.01 CLASS 2 SEVERE OBESITY WITH SERIOUS COMORBIDITY AND BODY MASS INDEX (BMI) OF 36.0 TO 36.9 IN ADULT, UNSPECIFIED OBESITY TYPE (H): ICD-10-CM

## 2025-02-11 DIAGNOSIS — L97.929 VENOUS STASIS ULCER OF LEFT LOWER LEG WITH EDEMA OF LEFT LOWER LEG (H): ICD-10-CM

## 2025-02-11 DIAGNOSIS — Z79.4 TYPE 2 DIABETES MELLITUS WITH OTHER SPECIFIED COMPLICATION, WITH LONG-TERM CURRENT USE OF INSULIN (H): ICD-10-CM

## 2025-02-11 DIAGNOSIS — I83.029 VENOUS STASIS ULCER OF LEFT LOWER LEG WITH EDEMA OF LEFT LOWER LEG (H): ICD-10-CM

## 2025-02-11 DIAGNOSIS — M97.8XXD: ICD-10-CM

## 2025-02-11 DIAGNOSIS — G14 POST-POLIO SYNDROME (H): ICD-10-CM

## 2025-02-11 DIAGNOSIS — E66.812 CLASS 2 SEVERE OBESITY WITH SERIOUS COMORBIDITY AND BODY MASS INDEX (BMI) OF 36.0 TO 36.9 IN ADULT, UNSPECIFIED OBESITY TYPE (H): ICD-10-CM

## 2025-02-11 DIAGNOSIS — R60.0 VENOUS STASIS ULCER OF LEFT LOWER LEG WITH EDEMA OF LEFT LOWER LEG (H): ICD-10-CM

## 2025-02-11 DIAGNOSIS — Z98.890 S/P SHOULDER SURGERY: Primary | ICD-10-CM

## 2025-02-11 DIAGNOSIS — Z96.619: ICD-10-CM

## 2025-02-11 DIAGNOSIS — Z98.890 S/P ORIF (OPEN REDUCTION INTERNAL FIXATION) FRACTURE: ICD-10-CM

## 2025-02-11 DIAGNOSIS — D64.9 ACUTE ON CHRONIC ANEMIA: ICD-10-CM

## 2025-02-11 PROCEDURE — 99310 SBSQ NF CARE HIGH MDM 45: CPT | Performed by: NURSE PRACTITIONER

## 2025-02-11 RX ORDER — LEVOTHYROXINE SODIUM 137 UG/1
137 TABLET ORAL
COMMUNITY
Start: 2025-02-11

## 2025-02-11 NOTE — PROGRESS NOTES
[facility]    Chief Complaint   Patient presents with    RECHECK     HPI:  Chasity Hodgson is a 79 year old  (1945), who is being seen today for an episodic care visit at: [facility]  ([facility]) [[id number]]. Today's concern is: Patient seen today for follow up NP visit in TCU:    1. S/P shoulder surgery    2. Venous stasis ulcer of left lower leg with edema of left lower leg (H)    3. Post-polio syndrome (H)    4. Class 2 severe obesity with serious comorbidity and body mass index (BMI) of 36.0 to 36.9 in adult, unspecified obesity type (H)    5. Type 2 diabetes mellitus with other specified complication, with long-term current use of insulin (H)    6. Chronic kidney disease, stage 3b (H)    7. Periprosthetic fracture around internal prosthetic shoulder joint, subsequent encounter    8. S/P ORIF (open reduction internal fixation) fracture    9. Pain of left lower leg    10. Acute on chronic anemia    11. Falls, subsequent encounter    12. Primary hypertension    13. Stage 3 chronic kidney disease, unspecified whether stage 3a or 3b CKD (H)    14. Type 2 diabetes mellitus with chronic kidney disease, with long-term current use of insulin, unspecified CKD stage (H)      Doing okay overall. She reports some left sided rib pan with coughing. She is using pain medications for shoulder surgery. Reports no phlegm, constipation or concerns with urination. Nursing reports no fevers,O2 stable.   Denies HA, chest pain, palpitations, dizziness. HR controlled. BP stable so far in TCU. No troubles breathing, no SOB-O2 96% on RA, no Concerns with urination, no constipation.  Eating and drinking okay. BS 99 this morning Sleeping okay. Participating in therapies. Labs reviewed and stable           Allergies, and PMH/PSH reviewed in [place] [place] today.  REVIEW OF SYSTEMS:  10 point ROS of systems including Constitutional, Eyes, Respiratory, Cardiovascular, Gastroenterology, Genitourinary, Integumentary,  "Musculoskeletal, Psychiatric were all negative except for pertinent positives noted in my HPI.    Objective:   BP (!) 147/51   Pulse 56   Temp 97.8  F (36.6  C)   Resp 18   Ht 1.6 m (5' 3\")   Wt 92.1 kg (203 lb)   LMP  (LMP Unknown)   SpO2 96%   BMI 35.96 kg/m    GENERAL APPEARANCE:  Alert, in no distress, oriented, cooperative  ENT:  Mouth and posterior oropharynx normal, moist mucous membranes  EYES:  EOM, conjunctivae, lids, pupils and irises normal  NECK:  No adenopathy,masses or thyromegaly  RESP:  respiratory effort and palpation of chest normal, lungs clear to auscultation , no respiratory distress  CV:  Palpation and auscultation of heart done , regular rate and rhythm, no murmur, rub, or gallop, no edema to LE  GI/ABDOMEN:  normal bowel sounds, soft, nontender, no hepatosplenomegaly or other masses. BS active   M/S:   moves extremities spontaneously. Ambulation not tested . Left arm in sling  SKIN:  no rashes to exposed skin.   NEURO:   intact   PSYCH:  oriented X 3, normal insight, judgement and memory, affect and mood normal,       Recent labs in [facility] reviewed by me today.     Last Comprehensive Metabolic Panel:  Lab Results   Component Value Date     02/07/2025    POTASSIUM 4.9 02/07/2025    CHLORIDE 101 02/07/2025    CO2 25 02/07/2025    ANIONGAP 11 02/07/2025     (H) 02/07/2025    BUN 34.8 (H) 02/07/2025    CR 1.29 (H) 02/07/2025    GFRESTIMATED 42 (L) 02/07/2025    RINKU 8.7 (L) 02/07/2025       Lab Results   Component Value Date    WBC 6.7 02/05/2025    WBC 7.2 04/26/2021     Lab Results   Component Value Date    RBC 2.88 02/05/2025    RBC 3.77 04/26/2021     Lab Results   Component Value Date    HGB 8.9 02/05/2025    HGB 11.7 04/26/2021     Lab Results   Component Value Date    HCT 28.0 02/05/2025    HCT 35.0 04/26/2021     Lab Results   Component Value Date    MCV 97 02/05/2025    MCV 93 04/26/2021     Lab Results   Component Value Date    MCH 30.9 02/05/2025    [facility] 31.0 " 04/26/2021     Lab Results   Component Value Date    MCHC 31.8 02/05/2025    MCHC 33.4 04/26/2021     Lab Results   Component Value Date    RDW 13.3 02/05/2025    RDW 12.5 04/26/2021     Lab Results   Component Value Date     02/05/2025     04/26/2021         Assessment/Plan:    Left periprosthetic humerus fracture status post ORIF on 1/26/2025  Fall, subsequent encounter  Pain is managed  Plan: Continue pain management with Tylenol 975 mg every 8 hours, lidocaine patch, methocarbamol 500 mg 4 times daily as needed, oxycodone 5 mg every 4 hours as needed: Nursing reports no scripts needed today.   Continue aspirin 81 mg daily x 3 weeks for DVT prophylaxis. Nonweightbearing to shoulder in all times. Therapy as below. Wound care as ordered.   Follow up with ortho 2 weeks: Ortho appt today (2/11 and 2/14)      Left lower extremity cramping and discomfort  Stable  Plan: biofreeze PRN. Monitor pain.      Acute anemia  Chronic anemia  Hemoglobin 11  Hemoglobin 9.9 on 1/31/2025>8.9 on recheck  Plan: Denies bleeding, periodically check Hgb,could start iron tablet but she is on MVT     Nonobstructive CAD  Essential hypertension  Dyslipidemia  SBP  consistently elevated to 160's  Plan: Restart amlodipine. Continue carvedilol 12.5 mg BID, olmesartan 40 mg daily, furosemide 20 mg daily PRN. Continue icosapent 2 gram daily, fexofenadine 180 mg daily, ezetimibe 10 mg daily, nitroglycerin PRN. Monitor BP and cardiac status. Continue compression stockings on in AM, off in PM     Type 2 diabetes  Hemoglobin a1c 7.6 on 10/7/2025  -281, majority are <200  Plan: Continue empagliflozin 10 mg daily. Continue glargine 10 units daily and hold if BS<110 and aspart 10 units daily with breakfast and 12 units with supper and hold if BS<100 or not eating. QID BS. Carb controlled diet.     CKD stage 3  Baseline creatinine 1.2-1.5  Creatinine 1.24 on 1/31/2025  Plan: Recheck labs stable: Creat 1.29     Hypothyroidism  TSH 0.42  on 10/7/2024  Plan: Increase levothyroxine 137 mcg daily.   TSH   Date Value Ref Range Status   02/05/2025 11.85 (H) 0.30 - 4.20 uIU/mL Final   10/12/2022 2.77 0.40 - 4.00 mU/L Final   11/27/2020 14.89 (H) 0.30 - 5.00 uIU/mL Final     Recheck TSH in 4 weeks     GERD  Plan: Continue famotidine 40 mg daily and pantoprazole 40 mg BID and sucralafate 1 gram QID. Monitor symptoms.     Colostomy in place  At risk for Slow transit constipation  Resolved   Plan: Continue senna S1 tab at bedtime and 1 tab twice daily as needed.  Monitor bowels.  Nursing to continue colostomy cares.     Obesity, BMI 36.14  ANNETTE  Complicates care  Plan: Encourage weight loss.  Dietitian follows in the TCU.  Continue CPAP per home settings.     Physical deconditioning  Secondary to recent hospitalization, medical conditions as above  Patient continues to work with therapy  Plan: Encourage participation in physical therapy/occupational therapy for strengthening and deconditioning. Discharge planning per their recommendation. Social work to assist with d/c planning.     Cognitive testing in the TCU  [facility]      Electronically signed by: Sarah Eugene CNP       Total time greater than 55 minutes reviewing chart in EPIC and PCC, medications, labs, vitals. Discussing with social work, physical therapy, occupational therapy and nursing.

## 2025-02-11 NOTE — LETTER
2/11/2025      Chasity Hodgson  [address]  [facility] [address]        [facility]    Chief Complaint   Patient presents with     RECHECK     HPI:  Chasity Hodgson is a 79 year old  (1945), who is being seen today for an episodic care visit at: [facility]  ([facility]) [[id number]]. Today's concern is: Patient seen today for follow up NP visit in U:    1. S/P shoulder surgery    2. Venous stasis ulcer of left lower leg with edema of left lower leg (H)    3. Post-polio syndrome (H)    4. Class 2 severe obesity with serious comorbidity and body mass index (BMI) of 36.0 to 36.9 in adult, unspecified obesity type (H)    5. Type 2 diabetes mellitus with other specified complication, with long-term current use of insulin (H)    6. Chronic kidney disease, stage 3b (H)    7. Periprosthetic fracture around internal prosthetic shoulder joint, subsequent encounter    8. S/P ORIF (open reduction internal fixation) fracture    9. Pain of left lower leg    10. Acute on chronic anemia    11. Falls, subsequent encounter    12. Primary hypertension    13. Stage 3 chronic kidney disease, unspecified whether stage 3a or 3b CKD (H)    14. Type 2 diabetes mellitus with chronic kidney disease, with long-term current use of insulin, unspecified CKD stage (H)      Doing okay overall. She reports some left sided rib pan with coughing. She is using pain medications for shoulder surgery. Reports no phlegm, constipation or concerns with urination. Nursing reports no fevers,O2 stable.   Denies HA, chest pain, palpitations, dizziness. HR controlled. BP stable so far in TCU. No troubles breathing, no SOB-O2 96% on RA, no Concerns with urination, no constipation.  Eating and drinking okay. BS 99 this morning Sleeping okay. Participating in therapies. Labs reviewed and stable           Allergies, and PMH/PSH reviewed in [employer] today.  REVIEW OF SYSTEMS:  10 point ROS of systems including Constitutional, Eyes,  "Respiratory, Cardiovascular, Gastroenterology, Genitourinary, Integumentary, Musculoskeletal, Psychiatric were all negative except for pertinent positives noted in my HPI.    Objective:   BP (!) 147/51   Pulse 56   Temp 97.8  F (36.6  C)   Resp 18   Ht 1.6 m (5' 3\")   Wt 92.1 kg (203 lb)   LMP  (LMP Unknown)   SpO2 96%   BMI 35.96 kg/m    GENERAL APPEARANCE:  Alert, in no distress, oriented, cooperative  ENT:  Mouth and posterior oropharynx normal, moist mucous membranes  EYES:  EOM, conjunctivae, lids, pupils and irises normal  NECK:  No adenopathy,masses or thyromegaly  RESP:  respiratory effort and palpation of chest normal, lungs clear to auscultation , no respiratory distress  CV:  Palpation and auscultation of heart done , regular rate and rhythm, no murmur, rub, or gallop, no edema to LE  GI/ABDOMEN:  normal bowel sounds, soft, nontender, no hepatosplenomegaly or other masses. BS active   M/S:   moves extremities spontaneously. Ambulation not tested . Left arm in sling  SKIN:  no rashes to exposed skin.   NEURO:   intact   PSYCH:  oriented X 3, normal insight, judgement and memory, affect and mood normal,       Recent labs in [place] reviewed by me today.     Last Comprehensive Metabolic Panel:  Lab Results   Component Value Date     02/07/2025    POTASSIUM 4.9 02/07/2025    CHLORIDE 101 02/07/2025    CO2 25 02/07/2025    ANIONGAP 11 02/07/2025     (H) 02/07/2025    BUN 34.8 (H) 02/07/2025    CR 1.29 (H) 02/07/2025    GFRESTIMATED 42 (L) 02/07/2025    RINKU 8.7 (L) 02/07/2025       Lab Results   Component Value Date    WBC 6.7 02/05/2025    WBC 7.2 04/26/2021     Lab Results   Component Value Date    RBC 2.88 02/05/2025    RBC 3.77 04/26/2021     Lab Results   Component Value Date    HGB 8.9 02/05/2025    HGB 11.7 04/26/2021     Lab Results   Component Value Date    HCT 28.0 02/05/2025    HCT 35.0 04/26/2021     Lab Results   Component Value Date    MCV 97 02/05/2025    MCV 93 04/26/2021 "     Lab Results   Component Value Date    MCH 30.9 02/05/2025    MCH 31.0 04/26/2021     Lab Results   Component Value Date    MCHC 31.8 02/05/2025    MCHC 33.4 04/26/2021     Lab Results   Component Value Date    RDW 13.3 02/05/2025    RDW 12.5 04/26/2021     Lab Results   Component Value Date     02/05/2025     04/26/2021         Assessment/Plan:    Left periprosthetic humerus fracture status post ORIF on 1/26/2025  Fall, subsequent encounter  Pain is managed  Plan: Continue pain management with Tylenol 975 mg every 8 hours, lidocaine patch, methocarbamol 500 mg 4 times daily as needed, oxycodone 5 mg every 4 hours as needed: Nursing reports no scripts needed today.   Continue aspirin 81 mg daily x 3 weeks for DVT prophylaxis. Nonweightbearing to shoulder in all times. Therapy as below. Wound care as ordered.   Follow up with ortho 2 weeks: Ortho appt today (2/11 and 2/14)      Left lower extremity cramping and discomfort  Stable  Plan: biofreeze PRN. Monitor pain.      Acute anemia  Chronic anemia  Hemoglobin 11  Hemoglobin 9.9 on 1/31/2025>8.9 on recheck  Plan: Denies bleeding, periodically check Hgb,could start iron tablet but she is on MVT     Nonobstructive CAD  Essential hypertension  Dyslipidemia  SBP  consistently elevated to 160's  Plan: Restart amlodipine. Continue carvedilol 12.5 mg BID, olmesartan 40 mg daily, furosemide 20 mg daily PRN. Continue icosapent 2 gram daily, fexofenadine 180 mg daily, ezetimibe 10 mg daily, nitroglycerin PRN. Monitor BP and cardiac status. Continue compression stockings on in AM, off in PM     Type 2 diabetes  Hemoglobin a1c 7.6 on 10/7/2025  -281, majority are <200  Plan: Continue empagliflozin 10 mg daily. Continue glargine 10 units daily and hold if BS<110 and aspart 10 units daily with breakfast and 12 units with supper and hold if BS<100 or not eating. QID BS. Carb controlled diet.     CKD stage 3  Baseline creatinine 1.2-1.5  Creatinine 1.24 on  1/31/2025  Plan: Recheck labs stable: Creat 1.29     Hypothyroidism  TSH 0.42 on 10/7/2024  Plan: Increase levothyroxine 137 mcg daily.   TSH   Date Value Ref Range Status   02/05/2025 11.85 (H) 0.30 - 4.20 uIU/mL Final   10/12/2022 2.77 0.40 - 4.00 mU/L Final   11/27/2020 14.89 (H) 0.30 - 5.00 uIU/mL Final     Recheck TSH in 4 weeks     GERD  Plan: Continue famotidine 40 mg daily and pantoprazole 40 mg BID and sucralafate 1 gram QID. Monitor symptoms.     Colostomy in place  At risk for Slow transit constipation  Resolved   Plan: Continue senna S1 tab at bedtime and 1 tab twice daily as needed.  Monitor bowels.  Nursing to continue colostomy cares.     Obesity, BMI 36.14  ANNETTE  Complicates care  Plan: Encourage weight loss.  Dietitian follows in the TCU.  Continue CPAP per home settings.     Physical deconditioning  Secondary to recent hospitalization, medical conditions as above  Patient continues to work with therapy  Plan: Encourage participation in physical therapy/occupational therapy for strengthening and deconditioning. Discharge planning per their recommendation. Social work to assist with d/c planning.     Cognitive testing in the TCU  [facility]      Electronically signed by: Sarah Eugene CNP       Total time greater than 55 minutes reviewing chart in EPIC and PCC, medications, labs, vitals. Discussing with social work, physical therapy, occupational therapy and nursing.           Sincerely,        Sarah Eugene CNP    Electronically signed

## 2025-02-12 ENCOUNTER — PATIENT OUTREACH (OUTPATIENT)
Dept: CARE COORDINATION | Facility: CLINIC | Age: 80
End: 2025-02-12
Payer: MEDICARE

## 2025-02-12 DIAGNOSIS — Z98.890 S/P SHOULDER SURGERY: ICD-10-CM

## 2025-02-12 RX ORDER — OXYCODONE HYDROCHLORIDE 5 MG/1
5 TABLET ORAL EVERY 4 HOURS PRN
Qty: 30 TABLET | Refills: 0 | Status: SHIPPED | OUTPATIENT
Start: 2025-02-12

## 2025-02-12 NOTE — PROGRESS NOTES
Clinic Care Coordination Contact    The Community Health Worker planned to call for a Care Coordination outreach to follow up on goals and action steps.     Did Not Contact Patient.    Per Chart Review, patient is currently at a TCU as of 1/31/25.    CHW will outreach in one month.    HERMANN Hwang  Clinic Care Coordination  [facility]: [place] [place], [place], [place], and [place] [facility]  Phone: [phone]

## (undated) DEVICE — SPONGE LAP 18X18" X8435

## (undated) DEVICE — SU FIBERWIRE 2 38"  AR-7200

## (undated) DEVICE — SYR BULB IRRIG DOVER 60 ML LATEX FREE 67000

## (undated) DEVICE — INTRODUCER CATH VASC 5FRX10CM  MPIS-501-NT-U-SST

## (undated) DEVICE — DRAPE ARTHROSCOPY SHOULDER BEACHCHAIR 29369

## (undated) DEVICE — PREP POVIDONE-IODINE 7.5% SCRUB 4OZ BOTTLE MDS093945

## (undated) DEVICE — ESU PENCIL W/SMOKE EVAC CVPLP2000

## (undated) DEVICE — Device

## (undated) DEVICE — SU DERMABOND ADVANCED .7ML DNX12

## (undated) DEVICE — GUIDEWIRE HI-TORQUE VERSACORE MOD J 1

## (undated) DEVICE — BAG CLEAR TRASH 1.3M 39X33" P4040C

## (undated) DEVICE — PAD CHUX UNDERPAD 30X36" P3036C

## (undated) DEVICE — SYR 30ML LL W/O NDL 302832

## (undated) DEVICE — SUCTION MANIFOLD NEPTUNE 2 SYS 1 PORT 702-025-000

## (undated) DEVICE — SU ETHIBOND 5 V-37 4X30" MB66G

## (undated) DEVICE — TOTE ANGIO CORP PC15AT SAN32CC83O

## (undated) DEVICE — PACK SET-UP STD 9102

## (undated) DEVICE — IMM SHOULDER XLG 79-84018

## (undated) DEVICE — NDL 18GA 1.5" 305196

## (undated) DEVICE — SOL WATER IRRIG 3000ML BAG 2B0307

## (undated) DEVICE — DEFIB PRO-PADZ LVP LQD GEL ADULT 8900-2105-01

## (undated) DEVICE — SET HANDPIECE INTERPULSE W/COAXIAL FAN SPRAY TIP 0210118000

## (undated) DEVICE — SOL NACL 0.9% IRRIG 3000ML BAG 2B7477

## (undated) DEVICE — SU STRATAFIX PDS PLUS 1 CT-1 12" SXPP1A443

## (undated) DEVICE — SYR BULB IRRIG 50ML LATEX FREE 0035280

## (undated) DEVICE — DRAPE STERI U 1015

## (undated) DEVICE — SUCTION MANIFOLD NEPTUNE 2 SYS 4 PORT 0702-020-000

## (undated) DEVICE — CONNECTOR WATER VALVE PERFUSION PACK STR 020272801

## (undated) DEVICE — CATH ANGIO INFINITI PIGTAIL 145 6 SH 6FRX110CM  534-652S

## (undated) DEVICE — ESU GROUND PAD ADULT W/CORD E7507

## (undated) DEVICE — LINEN TOWEL PACK X5 5464

## (undated) DEVICE — SUCTION TIP YANKAUER W/O VENT K86

## (undated) DEVICE — PACK CYSTO CUSTOM ASC

## (undated) DEVICE — DRAPE C-ARM 60X42" 1013

## (undated) DEVICE — GLOVE BIOGEL PI MICRO INDICATOR UNDERGLOVE SZ 8.0 48980

## (undated) DEVICE — NDL ASPIRATING INJECT 22GA 31.25CM

## (undated) DEVICE — SUTURE NONABSORBABLE 5 LOOP NEEDLE WHITE SMSL50103

## (undated) DEVICE — DRAPE CONVERTORS U-DRAPE 60X72" 8476

## (undated) DEVICE — DRILL BIT SYN QUICK COUPLING 2.8X165MM 310.288

## (undated) DEVICE — DRILL BIT QUICK COUPLING 2.5X110MM GOLD 310.25

## (undated) DEVICE — GUIDEWIRE SENSOR DUAL FLEX STR 0.035"X150CM M0066703080

## (undated) DEVICE — PREP CHLORAPREP 26ML TINTED HI-LITE ORANGE 930815

## (undated) DEVICE — SU VICRYL 0 CT-1 27" J340H

## (undated) DEVICE — MANIFOLD KIT ANGIO AUTOMATED 014613

## (undated) DEVICE — INTRO SHEATH 6FRX10CM PINNACLE RSS602

## (undated) DEVICE — KIT HAND CONTROL ANGIOTOUCH ACIST 65CM AT-P65

## (undated) DEVICE — CATH ANGIO JUDKINS JL4 6FRX100CM INFINITI 534620T

## (undated) DEVICE — IMM SHOULDER MED 79-84015

## (undated) DEVICE — INTRO TERUMO 6FRX25CM W/MARKER RSB603

## (undated) DEVICE — SU VICRYL 1 CT-1 27" J341H

## (undated) DEVICE — KIT SHOULDER POSITIONING BEACH CHAIR ARM HOLDER AR-1644

## (undated) DEVICE — LINEN ORTHO ACL PACK 5447

## (undated) DEVICE — SYR 10ML LL W/O NDL

## (undated) DEVICE — GLOVE BIOGEL PI ULTRATOUCH SZ 8.0 41180

## (undated) DEVICE — GLOVE BIOGEL PI MICRO SZ 6.5 48565

## (undated) DEVICE — SOL NACL 0.9% IRRIG 500ML BOTTLE 2F7123

## (undated) DEVICE — PREP POVIDONE IODINE SOLUTION 10% 4OZ BOTTLE 29906-004

## (undated) DEVICE — DRAPE X-RAY TUBE 00-901169-01-OEC

## (undated) DEVICE — IMM SHOULDER LG 79-84017

## (undated) DEVICE — SU VICRYL 2-0 CT-1 36" UND J945H

## (undated) DEVICE — BLADE KNIFE SURG 10 371110

## (undated) DEVICE — DRSG ABDOMINAL 07 1/2X8" 7197D

## (undated) DEVICE — SUTURE NONABSORBABLE 5 LOOP NEEDLE WHITE SMSL50203

## (undated) DEVICE — LINEN FULL SHEET 5511

## (undated) DEVICE — SOL WATER IRRIG 3000ML BAG 2B7117

## (undated) DEVICE — ZIP 24 SURGICAL SKIN CLOSURE DEVICE

## (undated) DEVICE — SU VICRYL 0 CT-1 36" J346H

## (undated) DEVICE — CATH ANGIO INFINITI 3DRC 6FRX100CM 534676T

## (undated) DEVICE — ESU PENCIL W/HOLSTER E2350H

## (undated) DEVICE — DRAPE IOBAN INCISE 23X17" 6650EZ

## (undated) DEVICE — DRSG TEGADERM 4X10" 1627

## (undated) DEVICE — PACK SHOULDER RIDGES

## (undated) DEVICE — LINEN HALF SHEET 5512

## (undated) RX ORDER — FENTANYL CITRATE 50 UG/ML
INJECTION, SOLUTION INTRAMUSCULAR; INTRAVENOUS
Status: DISPENSED
Start: 2018-02-23

## (undated) RX ORDER — HYDROMORPHONE HCL IN WATER/PF 6 MG/30 ML
PATIENT CONTROLLED ANALGESIA SYRINGE INTRAVENOUS
Status: DISPENSED
Start: 2025-01-25

## (undated) RX ORDER — ACETAMINOPHEN 325 MG/1
TABLET ORAL
Status: DISPENSED
Start: 2024-11-04

## (undated) RX ORDER — BUPIVACAINE HYDROCHLORIDE 5 MG/ML
INJECTION, SOLUTION PERINEURAL
Status: DISPENSED
Start: 2022-02-20

## (undated) RX ORDER — CEPHALEXIN 500 MG/1
CAPSULE ORAL
Status: DISPENSED
Start: 2023-07-06

## (undated) RX ORDER — ONDANSETRON 2 MG/ML
INJECTION INTRAMUSCULAR; INTRAVENOUS
Status: DISPENSED
Start: 2025-01-26

## (undated) RX ORDER — HEPARIN SODIUM 1000 [USP'U]/ML
INJECTION, SOLUTION INTRAVENOUS; SUBCUTANEOUS
Status: DISPENSED
Start: 2018-02-23

## (undated) RX ORDER — LABETALOL HYDROCHLORIDE 5 MG/ML
INJECTION, SOLUTION INTRAVENOUS
Status: DISPENSED
Start: 2024-02-20

## (undated) RX ORDER — REGADENOSON 0.08 MG/ML
INJECTION, SOLUTION INTRAVENOUS
Status: DISPENSED
Start: 2018-02-06

## (undated) RX ORDER — ACETAMINOPHEN 325 MG/1
TABLET ORAL
Status: DISPENSED
Start: 2023-09-18

## (undated) RX ORDER — VANCOMYCIN HYDROCHLORIDE 1 G/20ML
INJECTION, POWDER, LYOPHILIZED, FOR SOLUTION INTRAVENOUS
Status: DISPENSED
Start: 2025-01-26

## (undated) RX ORDER — FENTANYL CITRATE 50 UG/ML
INJECTION, SOLUTION INTRAMUSCULAR; INTRAVENOUS
Status: DISPENSED
Start: 2025-01-26

## (undated) RX ORDER — HEPARIN SODIUM 1000 [USP'U]/ML
INJECTION, SOLUTION INTRAVENOUS; SUBCUTANEOUS
Status: DISPENSED
Start: 2023-11-17

## (undated) RX ORDER — FENTANYL CITRATE-0.9 % NACL/PF 10 MCG/ML
PLASTIC BAG, INJECTION (ML) INTRAVENOUS
Status: DISPENSED
Start: 2025-01-26

## (undated) RX ORDER — CEFAZOLIN SODIUM 2 G/50ML
SOLUTION INTRAVENOUS
Status: DISPENSED
Start: 2024-02-20

## (undated) RX ORDER — NITROGLYCERIN 5 MG/ML
VIAL (ML) INTRAVENOUS
Status: DISPENSED
Start: 2018-02-23

## (undated) RX ORDER — CEFAZOLIN SODIUM/WATER 2 G/20 ML
SYRINGE (ML) INTRAVENOUS
Status: DISPENSED
Start: 2025-01-26

## (undated) RX ORDER — FENTANYL CITRATE 50 UG/ML
INJECTION, SOLUTION INTRAMUSCULAR; INTRAVENOUS
Status: DISPENSED
Start: 2023-11-17

## (undated) RX ORDER — VANCOMYCIN HYDROCHLORIDE 1 G/20ML
INJECTION, POWDER, LYOPHILIZED, FOR SOLUTION INTRAVENOUS
Status: DISPENSED
Start: 2023-09-18

## (undated) RX ORDER — HYDROCODONE BITARTRATE AND ACETAMINOPHEN 5; 325 MG/1; MG/1
TABLET ORAL
Status: DISPENSED
Start: 2018-02-23

## (undated) RX ORDER — PROPOFOL 10 MG/ML
INJECTION, EMULSION INTRAVENOUS
Status: DISPENSED
Start: 2024-11-04

## (undated) RX ORDER — PROPOFOL 10 MG/ML
INJECTION, EMULSION INTRAVENOUS
Status: DISPENSED
Start: 2025-01-26

## (undated) RX ORDER — PROPOFOL 10 MG/ML
INJECTION, EMULSION INTRAVENOUS
Status: DISPENSED
Start: 2024-02-20

## (undated) RX ORDER — VERAPAMIL HYDROCHLORIDE 2.5 MG/ML
INJECTION, SOLUTION INTRAVENOUS
Status: DISPENSED
Start: 2018-02-23

## (undated) RX ORDER — HYDRALAZINE HYDROCHLORIDE 20 MG/ML
INJECTION INTRAMUSCULAR; INTRAVENOUS
Status: DISPENSED
Start: 2025-01-25

## (undated) RX ORDER — CEFAZOLIN SODIUM/WATER 2 G/20 ML
SYRINGE (ML) INTRAVENOUS
Status: DISPENSED
Start: 2025-01-25

## (undated) RX ORDER — ACETAMINOPHEN 325 MG/1
TABLET ORAL
Status: DISPENSED
Start: 2024-02-20

## (undated) RX ORDER — FENTANYL CITRATE 50 UG/ML
INJECTION, SOLUTION INTRAMUSCULAR; INTRAVENOUS
Status: DISPENSED
Start: 2024-02-20

## (undated) RX ORDER — ONDANSETRON 2 MG/ML
INJECTION INTRAMUSCULAR; INTRAVENOUS
Status: DISPENSED
Start: 2024-02-20

## (undated) RX ORDER — GLYCOPYRROLATE 0.2 MG/ML
INJECTION, SOLUTION INTRAMUSCULAR; INTRAVENOUS
Status: DISPENSED
Start: 2025-01-26

## (undated) RX ORDER — LABETALOL HYDROCHLORIDE 5 MG/ML
INJECTION, SOLUTION INTRAVENOUS
Status: DISPENSED
Start: 2025-01-26

## (undated) RX ORDER — LIDOCAINE HYDROCHLORIDE 10 MG/ML
INJECTION, SOLUTION EPIDURAL; INFILTRATION; INTRACAUDAL; PERINEURAL
Status: DISPENSED
Start: 2018-02-23

## (undated) RX ORDER — VANCOMYCIN HYDROCHLORIDE 500 MG/10ML
INJECTION, POWDER, LYOPHILIZED, FOR SOLUTION INTRAVENOUS
Status: DISPENSED
Start: 2023-09-18

## (undated) RX ORDER — TRANEXAMIC ACID 10 MG/ML
INJECTION, SOLUTION INTRAVENOUS
Status: DISPENSED
Start: 2025-01-25

## (undated) RX ORDER — FENTANYL CITRATE 50 UG/ML
INJECTION, SOLUTION INTRAMUSCULAR; INTRAVENOUS
Status: DISPENSED
Start: 2024-11-04

## (undated) RX ORDER — NITROGLYCERIN 5 MG/ML
VIAL (ML) INTRAVENOUS
Status: DISPENSED
Start: 2023-11-17

## (undated) RX ORDER — HEPARIN SODIUM 200 [USP'U]/100ML
INJECTION, SOLUTION INTRAVENOUS
Status: DISPENSED
Start: 2023-11-17

## (undated) RX ORDER — LIDOCAINE HYDROCHLORIDE 20 MG/ML
JELLY TOPICAL
Status: DISPENSED
Start: 2024-11-04

## (undated) RX ORDER — LIDOCAINE HYDROCHLORIDE 10 MG/ML
INJECTION, SOLUTION EPIDURAL; INFILTRATION; INTRACAUDAL; PERINEURAL
Status: DISPENSED
Start: 2025-01-26

## (undated) RX ORDER — CEFAZOLIN SODIUM 2 G/50ML
SOLUTION INTRAVENOUS
Status: DISPENSED
Start: 2024-11-04

## (undated) RX ORDER — LIDOCAINE HYDROCHLORIDE 10 MG/ML
INJECTION, SOLUTION EPIDURAL; INFILTRATION; INTRACAUDAL; PERINEURAL
Status: DISPENSED
Start: 2023-11-17

## (undated) RX ORDER — DEXAMETHASONE SODIUM PHOSPHATE 4 MG/ML
INJECTION, SOLUTION INTRA-ARTICULAR; INTRALESIONAL; INTRAMUSCULAR; INTRAVENOUS; SOFT TISSUE
Status: DISPENSED
Start: 2025-01-26

## (undated) RX ORDER — TRANEXAMIC ACID 10 MG/ML
INJECTION, SOLUTION INTRAVENOUS
Status: DISPENSED
Start: 2025-01-26

## (undated) RX ORDER — EPHEDRINE SULFATE 50 MG/ML
INJECTION, SOLUTION INTRAMUSCULAR; INTRAVENOUS; SUBCUTANEOUS
Status: DISPENSED
Start: 2025-01-26